# Patient Record
Sex: MALE | Race: WHITE | NOT HISPANIC OR LATINO | Employment: OTHER | URBAN - NONMETROPOLITAN AREA
[De-identification: names, ages, dates, MRNs, and addresses within clinical notes are randomized per-mention and may not be internally consistent; named-entity substitution may affect disease eponyms.]

---

## 2017-01-10 ENCOUNTER — HOSPITAL ENCOUNTER (INPATIENT)
Facility: HOSPITAL | Age: 52
LOS: 3 days | Discharge: HOME | DRG: 282 | End: 2017-01-14
Attending: EMERGENCY MEDICINE | Admitting: HOSPITALIST
Payer: COMMERCIAL

## 2017-01-10 ENCOUNTER — APPOINTMENT (EMERGENCY)
Dept: CT IMAGING | Facility: HOSPITAL | Age: 52
DRG: 282 | End: 2017-01-10
Payer: COMMERCIAL

## 2017-01-10 DIAGNOSIS — K85.90 ACUTE PANCREATITIS: Primary | ICD-10-CM

## 2017-01-10 DIAGNOSIS — R10.84 GENERALIZED ABDOMINAL PAIN: ICD-10-CM

## 2017-01-10 DIAGNOSIS — R14.0 ABDOMINAL DISTENTION: ICD-10-CM

## 2017-01-10 LAB
ALBUMIN SERPL BCP-MCNC: 3.3 G/DL (ref 3.5–5)
ALP SERPL-CCNC: 235 U/L (ref 46–116)
ALT SERPL W P-5'-P-CCNC: 23 U/L (ref 12–78)
ANION GAP SERPL CALCULATED.3IONS-SCNC: 13 MMOL/L (ref 4–13)
AST SERPL W P-5'-P-CCNC: 69 U/L (ref 5–45)
BASOPHILS # BLD AUTO: 0.08 THOUSANDS/ΜL (ref 0–0.1)
BASOPHILS NFR BLD AUTO: 1 % (ref 0–1)
BILIRUB SERPL-MCNC: 0.2 MG/DL (ref 0.2–1)
BILIRUB UR QL STRIP: NEGATIVE
BUN SERPL-MCNC: 5 MG/DL (ref 5–25)
CALCIUM SERPL-MCNC: 8.2 MG/DL (ref 8.3–10.1)
CHLORIDE SERPL-SCNC: 92 MMOL/L (ref 100–108)
CLARITY UR: CLEAR
CO2 SERPL-SCNC: 25 MMOL/L (ref 21–32)
COLOR UR: NORMAL
CREAT SERPL-MCNC: 0.47 MG/DL (ref 0.6–1.3)
EOSINOPHIL # BLD AUTO: 0.11 THOUSAND/ΜL (ref 0–0.61)
EOSINOPHIL NFR BLD AUTO: 2 % (ref 0–6)
ERYTHROCYTE [DISTWIDTH] IN BLOOD BY AUTOMATED COUNT: 18.6 % (ref 11.6–15.1)
GFR SERPL CREATININE-BSD FRML MDRD: >60 ML/MIN/1.73SQ M
GLUCOSE SERPL-MCNC: 81 MG/DL (ref 65–140)
GLUCOSE UR STRIP-MCNC: NEGATIVE MG/DL
HCT VFR BLD AUTO: 26.2 % (ref 36.5–49.3)
HGB BLD-MCNC: 7.8 G/DL (ref 12–17)
HGB UR QL STRIP.AUTO: NEGATIVE
HOLD SPECIMEN: NORMAL
KETONES UR STRIP-MCNC: NEGATIVE MG/DL
LACTATE SERPL-SCNC: 1.9 MMOL/L (ref 0.5–2)
LEUKOCYTE ESTERASE UR QL STRIP: NEGATIVE
LIPASE SERPL-CCNC: 706 U/L (ref 73–393)
LYMPHOCYTES # BLD AUTO: 2.8 THOUSANDS/ΜL (ref 0.6–4.47)
LYMPHOCYTES NFR BLD AUTO: 44 % (ref 14–44)
MCH RBC QN AUTO: 21.5 PG (ref 26.8–34.3)
MCHC RBC AUTO-ENTMCNC: 29.8 G/DL (ref 31.4–37.4)
MCV RBC AUTO: 72 FL (ref 82–98)
MONOCYTES # BLD AUTO: 0.79 THOUSAND/ΜL (ref 0.17–1.22)
MONOCYTES NFR BLD AUTO: 13 % (ref 4–12)
NEUTROPHILS # BLD AUTO: 2.54 THOUSANDS/ΜL (ref 1.85–7.62)
NEUTS SEG NFR BLD AUTO: 40 % (ref 43–75)
NITRITE UR QL STRIP: NEGATIVE
PH UR STRIP.AUTO: 6 [PH] (ref 4.5–8)
PLATELET # BLD AUTO: 215 THOUSANDS/UL (ref 149–390)
PMV BLD AUTO: 8.8 FL (ref 8.9–12.7)
POTASSIUM SERPL-SCNC: 3.5 MMOL/L (ref 3.5–5.3)
PROT SERPL-MCNC: 7.5 G/DL (ref 6.4–8.2)
PROT UR STRIP-MCNC: NEGATIVE MG/DL
RBC # BLD AUTO: 3.62 MILLION/UL (ref 3.88–5.62)
SODIUM SERPL-SCNC: 130 MMOL/L (ref 136–145)
SP GR UR STRIP.AUTO: <=1.005 (ref 1–1.03)
UROBILINOGEN UR QL STRIP.AUTO: 0.2 E.U./DL
WBC # BLD AUTO: 6.32 THOUSAND/UL (ref 4.31–10.16)

## 2017-01-10 PROCEDURE — 83605 ASSAY OF LACTIC ACID: CPT | Performed by: EMERGENCY MEDICINE

## 2017-01-10 PROCEDURE — 81003 URINALYSIS AUTO W/O SCOPE: CPT | Performed by: EMERGENCY MEDICINE

## 2017-01-10 PROCEDURE — 83690 ASSAY OF LIPASE: CPT | Performed by: EMERGENCY MEDICINE

## 2017-01-10 PROCEDURE — 85025 COMPLETE CBC W/AUTO DIFF WBC: CPT | Performed by: EMERGENCY MEDICINE

## 2017-01-10 PROCEDURE — 36415 COLL VENOUS BLD VENIPUNCTURE: CPT

## 2017-01-10 PROCEDURE — 74177 CT ABD & PELVIS W/CONTRAST: CPT

## 2017-01-10 PROCEDURE — 80053 COMPREHEN METABOLIC PANEL: CPT | Performed by: EMERGENCY MEDICINE

## 2017-01-10 PROCEDURE — 93005 ELECTROCARDIOGRAM TRACING: CPT | Performed by: EMERGENCY MEDICINE

## 2017-01-10 PROCEDURE — 96374 THER/PROPH/DIAG INJ IV PUSH: CPT

## 2017-01-10 RX ADMIN — HYDROMORPHONE HYDROCHLORIDE 1 MG: 1 INJECTION, SOLUTION INTRAMUSCULAR; INTRAVENOUS; SUBCUTANEOUS at 21:22

## 2017-01-10 RX ADMIN — IOHEXOL 100 ML: 350 INJECTION, SOLUTION INTRAVENOUS at 22:56

## 2017-01-10 RX ADMIN — IOHEXOL 50 ML: 240 INJECTION, SOLUTION INTRATHECAL; INTRAVASCULAR; INTRAVENOUS; ORAL at 22:57

## 2017-01-11 ENCOUNTER — APPOINTMENT (INPATIENT)
Dept: ULTRASOUND IMAGING | Facility: HOSPITAL | Age: 52
DRG: 282 | End: 2017-01-11
Payer: COMMERCIAL

## 2017-01-11 ENCOUNTER — APPOINTMENT (INPATIENT)
Dept: NUCLEAR MEDICINE | Facility: HOSPITAL | Age: 52
DRG: 282 | End: 2017-01-11
Payer: COMMERCIAL

## 2017-01-11 PROBLEM — K85.90 ACUTE PANCREATITIS: Status: ACTIVE | Noted: 2017-01-11

## 2017-01-11 PROBLEM — K80.20 CHOLELITHIASIS: Status: ACTIVE | Noted: 2017-01-11

## 2017-01-11 PROBLEM — R10.84 GENERALIZED ABDOMINAL PAIN: Status: ACTIVE | Noted: 2017-01-11

## 2017-01-11 PROBLEM — D64.9 ANEMIA: Status: ACTIVE | Noted: 2017-01-11

## 2017-01-11 PROBLEM — K80.50 CHOLEDOCHOLITHIASIS: Status: ACTIVE | Noted: 2017-01-11

## 2017-01-11 PROBLEM — E87.6 HYPOKALEMIA: Status: ACTIVE | Noted: 2017-01-11

## 2017-01-11 PROBLEM — K43.9 VENTRAL HERNIA: Status: ACTIVE | Noted: 2017-01-11

## 2017-01-11 PROBLEM — E87.1 HYPONATREMIA: Status: ACTIVE | Noted: 2017-01-11

## 2017-01-11 PROBLEM — R10.9 INTRACTABLE ABDOMINAL PAIN: Status: ACTIVE | Noted: 2017-01-11

## 2017-01-11 PROBLEM — R10.84 GENERALIZED ABDOMINAL PAIN: Status: RESOLVED | Noted: 2017-01-11 | Resolved: 2017-01-11

## 2017-01-11 LAB
ANION GAP SERPL CALCULATED.3IONS-SCNC: 12 MMOL/L (ref 4–13)
ATRIAL RATE: 75 BPM
BUN SERPL-MCNC: 3 MG/DL (ref 5–25)
CALCIUM SERPL-MCNC: 7.9 MG/DL (ref 8.3–10.1)
CHLORIDE SERPL-SCNC: 96 MMOL/L (ref 100–108)
CO2 SERPL-SCNC: 27 MMOL/L (ref 21–32)
CREAT SERPL-MCNC: 0.35 MG/DL (ref 0.6–1.3)
ERYTHROCYTE [DISTWIDTH] IN BLOOD BY AUTOMATED COUNT: 18.7 % (ref 11.6–15.1)
GFR SERPL CREATININE-BSD FRML MDRD: >60 ML/MIN/1.73SQ M
GLUCOSE SERPL-MCNC: 77 MG/DL (ref 65–140)
HCT VFR BLD AUTO: 27.4 % (ref 36.5–49.3)
HGB BLD-MCNC: 8.1 G/DL (ref 12–17)
MCH RBC QN AUTO: 21.6 PG (ref 26.8–34.3)
MCHC RBC AUTO-ENTMCNC: 29.6 G/DL (ref 31.4–37.4)
MCV RBC AUTO: 73 FL (ref 82–98)
P AXIS: 70 DEGREES
PLATELET # BLD AUTO: 218 THOUSANDS/UL (ref 149–390)
PMV BLD AUTO: 8.7 FL (ref 8.9–12.7)
POTASSIUM SERPL-SCNC: 3 MMOL/L (ref 3.5–5.3)
PR INTERVAL: 162 MS
QRS AXIS: -15 DEGREES
QRSD INTERVAL: 94 MS
QT INTERVAL: 410 MS
QTC INTERVAL: 457 MS
RBC # BLD AUTO: 3.75 MILLION/UL (ref 3.88–5.62)
SODIUM SERPL-SCNC: 135 MMOL/L (ref 136–145)
T WAVE AXIS: 54 DEGREES
VENTRICULAR RATE: 75 BPM
WBC # BLD AUTO: 4.22 THOUSAND/UL (ref 4.31–10.16)

## 2017-01-11 PROCEDURE — 78227 HEPATOBIL SYST IMAGE W/DRUG: CPT

## 2017-01-11 PROCEDURE — 76705 ECHO EXAM OF ABDOMEN: CPT

## 2017-01-11 PROCEDURE — 85027 COMPLETE CBC AUTOMATED: CPT | Performed by: PHYSICIAN ASSISTANT

## 2017-01-11 PROCEDURE — 80048 BASIC METABOLIC PNL TOTAL CA: CPT | Performed by: PHYSICIAN ASSISTANT

## 2017-01-11 PROCEDURE — A9537 TC99M MEBROFENIN: HCPCS

## 2017-01-11 PROCEDURE — 99285 EMERGENCY DEPT VISIT HI MDM: CPT

## 2017-01-11 RX ORDER — POTASSIUM CHLORIDE 20 MEQ/1
40 TABLET, EXTENDED RELEASE ORAL
Status: COMPLETED | OUTPATIENT
Start: 2017-01-11 | End: 2017-01-11

## 2017-01-11 RX ORDER — ONDANSETRON 2 MG/ML
4 INJECTION INTRAMUSCULAR; INTRAVENOUS EVERY 6 HOURS PRN
Status: DISCONTINUED | OUTPATIENT
Start: 2017-01-11 | End: 2017-01-14 | Stop reason: HOSPADM

## 2017-01-11 RX ORDER — DIPHENHYDRAMINE HYDROCHLORIDE 50 MG/ML
25 INJECTION INTRAMUSCULAR; INTRAVENOUS EVERY 6 HOURS PRN
Status: DISCONTINUED | OUTPATIENT
Start: 2017-01-11 | End: 2017-01-14 | Stop reason: HOSPADM

## 2017-01-11 RX ORDER — ACETAMINOPHEN 325 MG/1
650 TABLET ORAL EVERY 6 HOURS PRN
Status: DISCONTINUED | OUTPATIENT
Start: 2017-01-11 | End: 2017-01-11

## 2017-01-11 RX ORDER — LORATADINE 10 MG/1
10 TABLET ORAL DAILY
Status: DISCONTINUED | OUTPATIENT
Start: 2017-01-11 | End: 2017-01-14 | Stop reason: HOSPADM

## 2017-01-11 RX ORDER — HEPARIN SODIUM 5000 [USP'U]/ML
5000 INJECTION, SOLUTION INTRAVENOUS; SUBCUTANEOUS EVERY 8 HOURS SCHEDULED
Status: DISCONTINUED | OUTPATIENT
Start: 2017-01-11 | End: 2017-01-14 | Stop reason: HOSPADM

## 2017-01-11 RX ORDER — KETOROLAC TROMETHAMINE 30 MG/ML
15 INJECTION, SOLUTION INTRAMUSCULAR; INTRAVENOUS EVERY 6 HOURS PRN
Status: DISCONTINUED | OUTPATIENT
Start: 2017-01-11 | End: 2017-01-11

## 2017-01-11 RX ORDER — SODIUM CHLORIDE AND POTASSIUM CHLORIDE .9; .15 G/100ML; G/100ML
125 SOLUTION INTRAVENOUS CONTINUOUS
Status: DISCONTINUED | OUTPATIENT
Start: 2017-01-11 | End: 2017-01-12

## 2017-01-11 RX ORDER — HYDROXYZINE HYDROCHLORIDE 25 MG/1
25 TABLET, FILM COATED ORAL EVERY 6 HOURS PRN
Status: DISCONTINUED | OUTPATIENT
Start: 2017-01-11 | End: 2017-01-11

## 2017-01-11 RX ORDER — NICOTINE 21 MG/24HR
1 PATCH, TRANSDERMAL 24 HOURS TRANSDERMAL DAILY
Status: DISCONTINUED | OUTPATIENT
Start: 2017-01-11 | End: 2017-01-14 | Stop reason: HOSPADM

## 2017-01-11 RX ORDER — SODIUM CHLORIDE 9 MG/ML
125 INJECTION, SOLUTION INTRAVENOUS CONTINUOUS
Status: DISCONTINUED | OUTPATIENT
Start: 2017-01-11 | End: 2017-01-11

## 2017-01-11 RX ORDER — PANTOPRAZOLE SODIUM 20 MG/1
20 TABLET, DELAYED RELEASE ORAL
Status: DISCONTINUED | OUTPATIENT
Start: 2017-01-11 | End: 2017-01-11

## 2017-01-11 RX ORDER — FOLIC ACID 1 MG/1
1 TABLET ORAL DAILY
Status: DISCONTINUED | OUTPATIENT
Start: 2017-01-11 | End: 2017-01-14 | Stop reason: HOSPADM

## 2017-01-11 RX ORDER — LISINOPRIL 10 MG/1
10 TABLET ORAL DAILY
Status: DISCONTINUED | OUTPATIENT
Start: 2017-01-11 | End: 2017-01-14 | Stop reason: HOSPADM

## 2017-01-11 RX ORDER — THIAMINE MONONITRATE (VIT B1) 100 MG
100 TABLET ORAL DAILY
Status: DISCONTINUED | OUTPATIENT
Start: 2017-01-11 | End: 2017-01-14 | Stop reason: HOSPADM

## 2017-01-11 RX ORDER — HYDROXYZINE HYDROCHLORIDE 25 MG/1
50 TABLET, FILM COATED ORAL EVERY 6 HOURS PRN
Status: DISCONTINUED | OUTPATIENT
Start: 2017-01-11 | End: 2017-01-14 | Stop reason: HOSPADM

## 2017-01-11 RX ORDER — PANTOPRAZOLE SODIUM 40 MG/1
40 INJECTION, POWDER, FOR SOLUTION INTRAVENOUS DAILY
Status: DISCONTINUED | OUTPATIENT
Start: 2017-01-12 | End: 2017-01-14 | Stop reason: HOSPADM

## 2017-01-11 RX ORDER — FLUOXETINE HYDROCHLORIDE 20 MG/1
20 CAPSULE ORAL DAILY
Status: DISCONTINUED | OUTPATIENT
Start: 2017-01-11 | End: 2017-01-14 | Stop reason: HOSPADM

## 2017-01-11 RX ORDER — SODIUM CHLORIDE, SODIUM LACTATE, POTASSIUM CHLORIDE, CALCIUM CHLORIDE 600; 310; 30; 20 MG/100ML; MG/100ML; MG/100ML; MG/100ML
150 INJECTION, SOLUTION INTRAVENOUS CONTINUOUS
Status: DISCONTINUED | OUTPATIENT
Start: 2017-01-11 | End: 2017-01-11

## 2017-01-11 RX ORDER — ACETAMINOPHEN 325 MG/1
650 TABLET ORAL EVERY 6 HOURS PRN
Status: DISCONTINUED | OUTPATIENT
Start: 2017-01-11 | End: 2017-01-12

## 2017-01-11 RX ORDER — LEVALBUTEROL 1.25 MG/.5ML
1.25 SOLUTION, CONCENTRATE RESPIRATORY (INHALATION) EVERY 6 HOURS PRN
Status: DISCONTINUED | OUTPATIENT
Start: 2017-01-11 | End: 2017-01-14 | Stop reason: HOSPADM

## 2017-01-11 RX ORDER — DIPHENHYDRAMINE HCL 25 MG
25 TABLET ORAL EVERY 6 HOURS PRN
Status: DISCONTINUED | OUTPATIENT
Start: 2017-01-11 | End: 2017-01-14 | Stop reason: HOSPADM

## 2017-01-11 RX ADMIN — PIPERACILLIN AND TAZOBACTAM 3.38 G: 3; .375 INJECTION, POWDER, FOR SOLUTION INTRAVENOUS at 22:22

## 2017-01-11 RX ADMIN — HEPARIN SODIUM 5000 UNITS: 5000 INJECTION, SOLUTION INTRAVENOUS; SUBCUTANEOUS at 22:22

## 2017-01-11 RX ADMIN — FOLIC ACID 1 MG: 1 TABLET ORAL at 08:19

## 2017-01-11 RX ADMIN — SODIUM CHLORIDE AND POTASSIUM CHLORIDE 125 ML/HR: .9; .15 SOLUTION INTRAVENOUS at 19:59

## 2017-01-11 RX ADMIN — SINCALIDE 1.2 MCG: 5 INJECTION, POWDER, LYOPHILIZED, FOR SOLUTION INTRAVENOUS at 14:49

## 2017-01-11 RX ADMIN — POTASSIUM CHLORIDE 40 MEQ: 1500 TABLET, EXTENDED RELEASE ORAL at 12:01

## 2017-01-11 RX ADMIN — HYDROMORPHONE HYDROCHLORIDE 1 MG: 1 INJECTION, SOLUTION INTRAMUSCULAR; INTRAVENOUS; SUBCUTANEOUS at 10:31

## 2017-01-11 RX ADMIN — HYDROXYZINE HYDROCHLORIDE 50 MG: 25 TABLET, FILM COATED ORAL at 14:09

## 2017-01-11 RX ADMIN — SODIUM CHLORIDE, SODIUM LACTATE, POTASSIUM CHLORIDE, AND CALCIUM CHLORIDE 150 ML/HR: .6; .31; .03; .02 INJECTION, SOLUTION INTRAVENOUS at 00:33

## 2017-01-11 RX ADMIN — HYDROMORPHONE HYDROCHLORIDE 1 MG: 1 INJECTION, SOLUTION INTRAMUSCULAR; INTRAVENOUS; SUBCUTANEOUS at 02:04

## 2017-01-11 RX ADMIN — PANTOPRAZOLE SODIUM 20 MG: 20 TABLET, DELAYED RELEASE ORAL at 05:03

## 2017-01-11 RX ADMIN — HEPARIN SODIUM 5000 UNITS: 5000 INJECTION, SOLUTION INTRAVENOUS; SUBCUTANEOUS at 02:04

## 2017-01-11 RX ADMIN — SODIUM CHLORIDE AND POTASSIUM CHLORIDE 125 ML/HR: .9; .15 SOLUTION INTRAVENOUS at 08:14

## 2017-01-11 RX ADMIN — LORATADINE 10 MG: 10 TABLET ORAL at 08:19

## 2017-01-11 RX ADMIN — DIPHENHYDRAMINE HYDROCHLORIDE 25 MG: 50 INJECTION, SOLUTION INTRAMUSCULAR; INTRAVENOUS at 16:54

## 2017-01-11 RX ADMIN — SODIUM CHLORIDE 125 ML/HR: 0.9 INJECTION, SOLUTION INTRAVENOUS at 04:14

## 2017-01-11 RX ADMIN — KETOROLAC TROMETHAMINE 15 MG: 30 INJECTION, SOLUTION INTRAMUSCULAR at 06:02

## 2017-01-11 RX ADMIN — Medication 100 MG: at 08:19

## 2017-01-11 RX ADMIN — DIPHENHYDRAMINE HYDROCHLORIDE 25 MG: 50 INJECTION, SOLUTION INTRAMUSCULAR; INTRAVENOUS at 04:18

## 2017-01-11 RX ADMIN — HYDROMORPHONE HYDROCHLORIDE 1 MG: 1 INJECTION, SOLUTION INTRAMUSCULAR; INTRAVENOUS; SUBCUTANEOUS at 16:53

## 2017-01-11 RX ADMIN — DIPHENHYDRAMINE HCL 25 MG: 25 TABLET ORAL at 02:04

## 2017-01-11 RX ADMIN — FLUOXETINE 20 MG: 20 CAPSULE ORAL at 08:19

## 2017-01-11 RX ADMIN — DIPHENHYDRAMINE HYDROCHLORIDE 25 MG: 50 INJECTION, SOLUTION INTRAMUSCULAR; INTRAVENOUS at 10:29

## 2017-01-11 RX ADMIN — HYDROMORPHONE HYDROCHLORIDE 1 MG: 1 INJECTION, SOLUTION INTRAMUSCULAR; INTRAVENOUS; SUBCUTANEOUS at 19:59

## 2017-01-11 RX ADMIN — POTASSIUM CHLORIDE 40 MEQ: 1500 TABLET, EXTENDED RELEASE ORAL at 16:54

## 2017-01-11 RX ADMIN — HEPARIN SODIUM 5000 UNITS: 5000 INJECTION, SOLUTION INTRAVENOUS; SUBCUTANEOUS at 14:09

## 2017-01-11 RX ADMIN — PIPERACILLIN AND TAZOBACTAM 3.38 G: 3; .375 INJECTION, POWDER, FOR SOLUTION INTRAVENOUS at 16:54

## 2017-01-11 RX ADMIN — HYDROXYZINE HYDROCHLORIDE 25 MG: 25 TABLET, FILM COATED ORAL at 08:19

## 2017-01-11 RX ADMIN — NICOTINE 1 PATCH: 14 PATCH, EXTENDED RELEASE TRANSDERMAL at 08:18

## 2017-01-11 RX ADMIN — Medication 400 MG: at 12:01

## 2017-01-11 RX ADMIN — LISINOPRIL 10 MG: 10 TABLET ORAL at 08:19

## 2017-01-11 RX ADMIN — HYDROMORPHONE HYDROCHLORIDE 1 MG: 1 INJECTION, SOLUTION INTRAMUSCULAR; INTRAVENOUS; SUBCUTANEOUS at 00:34

## 2017-01-11 RX ADMIN — HYDROXYZINE HYDROCHLORIDE 50 MG: 25 TABLET, FILM COATED ORAL at 19:59

## 2017-01-11 RX ADMIN — Medication 400 MG: at 08:19

## 2017-01-11 RX ADMIN — SODIUM CHLORIDE 2000 ML: 0.9 INJECTION, SOLUTION INTRAVENOUS at 01:41

## 2017-01-12 PROBLEM — K76.0 HEPATIC STEATOSIS: Status: ACTIVE | Noted: 2017-01-12

## 2017-01-12 PROBLEM — R16.0 HEPATOMEGALY: Status: ACTIVE | Noted: 2017-01-12

## 2017-01-12 PROBLEM — K81.0 ACUTE CHOLECYSTITIS: Status: ACTIVE | Noted: 2017-01-12

## 2017-01-12 PROBLEM — E83.42 HYPOMAGNESEMIA: Status: ACTIVE | Noted: 2017-01-12

## 2017-01-12 LAB
25(OH)D3 SERPL-MCNC: 23.8 NG/ML (ref 30–100)
ALBUMIN SERPL BCP-MCNC: 3.4 G/DL (ref 3.5–5)
ALP SERPL-CCNC: 199 U/L (ref 46–116)
ALT SERPL W P-5'-P-CCNC: 23 U/L (ref 12–78)
ANION GAP SERPL CALCULATED.3IONS-SCNC: 10 MMOL/L (ref 4–13)
AST SERPL W P-5'-P-CCNC: 46 U/L (ref 5–45)
BASOPHILS # BLD AUTO: 0.03 THOUSANDS/ΜL (ref 0–0.1)
BASOPHILS NFR BLD AUTO: 1 % (ref 0–1)
BILIRUB SERPL-MCNC: 0.6 MG/DL (ref 0.2–1)
BUN SERPL-MCNC: 3 MG/DL (ref 5–25)
CA-I BLD-SCNC: 1.19 MMOL/L (ref 1.12–1.32)
CALCIUM SERPL-MCNC: 9 MG/DL (ref 8.3–10.1)
CHLORIDE SERPL-SCNC: 96 MMOL/L (ref 100–108)
CK SERPL-CCNC: 94 U/L (ref 39–308)
CO2 SERPL-SCNC: 27 MMOL/L (ref 21–32)
CREAT SERPL-MCNC: 0.55 MG/DL (ref 0.6–1.3)
EOSINOPHIL # BLD AUTO: 0.1 THOUSAND/ΜL (ref 0–0.61)
EOSINOPHIL NFR BLD AUTO: 2 % (ref 0–6)
ERYTHROCYTE [DISTWIDTH] IN BLOOD BY AUTOMATED COUNT: 18.4 % (ref 11.6–15.1)
FOLATE SERPL-MCNC: 11.6 NG/ML (ref 3.1–17.5)
GFR SERPL CREATININE-BSD FRML MDRD: >60 ML/MIN/1.73SQ M
GLUCOSE SERPL-MCNC: 93 MG/DL (ref 65–140)
HAV IGM SER QL: NORMAL
HBV CORE IGM SER QL: NORMAL
HBV SURFACE AG SER QL: NORMAL
HCT VFR BLD AUTO: 28.3 % (ref 36.5–49.3)
HCV AB SER QL: NORMAL
HGB BLD-MCNC: 8.4 G/DL (ref 12–17)
LACTATE SERPL-SCNC: 1.1 MMOL/L (ref 0.5–2)
LIPASE SERPL-CCNC: 206 U/L (ref 73–393)
LYMPHOCYTES # BLD AUTO: 1.84 THOUSANDS/ΜL (ref 0.6–4.47)
LYMPHOCYTES NFR BLD AUTO: 32 % (ref 14–44)
MAGNESIUM SERPL-MCNC: 1.4 MG/DL (ref 1.6–2.6)
MCH RBC QN AUTO: 21.9 PG (ref 26.8–34.3)
MCHC RBC AUTO-ENTMCNC: 29.7 G/DL (ref 31.4–37.4)
MCV RBC AUTO: 74 FL (ref 82–98)
MONOCYTES # BLD AUTO: 0.87 THOUSAND/ΜL (ref 0.17–1.22)
MONOCYTES NFR BLD AUTO: 15 % (ref 4–12)
NEUTROPHILS # BLD AUTO: 2.85 THOUSANDS/ΜL (ref 1.85–7.62)
NEUTS SEG NFR BLD AUTO: 50 % (ref 43–75)
PHOSPHATE SERPL-MCNC: 3.3 MG/DL (ref 2.7–4.5)
PLATELET # BLD AUTO: 218 THOUSANDS/UL (ref 149–390)
PMV BLD AUTO: 9.4 FL (ref 8.9–12.7)
POTASSIUM SERPL-SCNC: 4.2 MMOL/L (ref 3.5–5.3)
PREALB SERPL-MCNC: 23.3 MG/DL (ref 18–40)
PROT SERPL-MCNC: 7.5 G/DL (ref 6.4–8.2)
RBC # BLD AUTO: 3.83 MILLION/UL (ref 3.88–5.62)
SODIUM SERPL-SCNC: 133 MMOL/L (ref 136–145)
VIT B12 SERPL-MCNC: 279 PG/ML (ref 100–900)
WBC # BLD AUTO: 5.69 THOUSAND/UL (ref 4.31–10.16)

## 2017-01-12 PROCEDURE — 97162 PT EVAL MOD COMPLEX 30 MIN: CPT | Performed by: PHYSICAL THERAPIST

## 2017-01-12 PROCEDURE — 80053 COMPREHEN METABOLIC PANEL: CPT | Performed by: INTERNAL MEDICINE

## 2017-01-12 PROCEDURE — C9113 INJ PANTOPRAZOLE SODIUM, VIA: HCPCS | Performed by: INTERNAL MEDICINE

## 2017-01-12 PROCEDURE — 83690 ASSAY OF LIPASE: CPT | Performed by: INTERNAL MEDICINE

## 2017-01-12 PROCEDURE — 82330 ASSAY OF CALCIUM: CPT | Performed by: INTERNAL MEDICINE

## 2017-01-12 PROCEDURE — 82607 VITAMIN B-12: CPT | Performed by: INTERNAL MEDICINE

## 2017-01-12 PROCEDURE — 82550 ASSAY OF CK (CPK): CPT | Performed by: INTERNAL MEDICINE

## 2017-01-12 PROCEDURE — G8979 MOBILITY GOAL STATUS: HCPCS | Performed by: PHYSICAL THERAPIST

## 2017-01-12 PROCEDURE — 84100 ASSAY OF PHOSPHORUS: CPT | Performed by: INTERNAL MEDICINE

## 2017-01-12 PROCEDURE — 85025 COMPLETE CBC W/AUTO DIFF WBC: CPT | Performed by: INTERNAL MEDICINE

## 2017-01-12 PROCEDURE — 82746 ASSAY OF FOLIC ACID SERUM: CPT | Performed by: INTERNAL MEDICINE

## 2017-01-12 PROCEDURE — 82306 VITAMIN D 25 HYDROXY: CPT | Performed by: INTERNAL MEDICINE

## 2017-01-12 PROCEDURE — G8988 SELF CARE GOAL STATUS: HCPCS

## 2017-01-12 PROCEDURE — 97535 SELF CARE MNGMENT TRAINING: CPT

## 2017-01-12 PROCEDURE — 83735 ASSAY OF MAGNESIUM: CPT | Performed by: INTERNAL MEDICINE

## 2017-01-12 PROCEDURE — G8987 SELF CARE CURRENT STATUS: HCPCS

## 2017-01-12 PROCEDURE — G8978 MOBILITY CURRENT STATUS: HCPCS | Performed by: PHYSICAL THERAPIST

## 2017-01-12 PROCEDURE — 84134 ASSAY OF PREALBUMIN: CPT | Performed by: INTERNAL MEDICINE

## 2017-01-12 PROCEDURE — 83605 ASSAY OF LACTIC ACID: CPT | Performed by: INTERNAL MEDICINE

## 2017-01-12 PROCEDURE — 97116 GAIT TRAINING THERAPY: CPT | Performed by: PHYSICAL THERAPIST

## 2017-01-12 PROCEDURE — 97165 OT EVAL LOW COMPLEX 30 MIN: CPT

## 2017-01-12 PROCEDURE — 80074 ACUTE HEPATITIS PANEL: CPT | Performed by: INTERNAL MEDICINE

## 2017-01-12 RX ORDER — MAGNESIUM SULFATE HEPTAHYDRATE 40 MG/ML
2 INJECTION, SOLUTION INTRAVENOUS ONCE
Status: COMPLETED | OUTPATIENT
Start: 2017-01-12 | End: 2017-01-12

## 2017-01-12 RX ORDER — SODIUM CHLORIDE 9 MG/ML
100 INJECTION, SOLUTION INTRAVENOUS CONTINUOUS
Status: DISCONTINUED | OUTPATIENT
Start: 2017-01-12 | End: 2017-01-14 | Stop reason: HOSPADM

## 2017-01-12 RX ADMIN — HYDROMORPHONE HYDROCHLORIDE 1 MG: 1 INJECTION, SOLUTION INTRAMUSCULAR; INTRAVENOUS; SUBCUTANEOUS at 16:39

## 2017-01-12 RX ADMIN — DIPHENHYDRAMINE HYDROCHLORIDE 25 MG: 50 INJECTION, SOLUTION INTRAMUSCULAR; INTRAVENOUS at 16:39

## 2017-01-12 RX ADMIN — PIPERACILLIN AND TAZOBACTAM 3.38 G: 3; .375 INJECTION, POWDER, FOR SOLUTION INTRAVENOUS at 19:02

## 2017-01-12 RX ADMIN — HYDROMORPHONE HYDROCHLORIDE 1 MG: 1 INJECTION, SOLUTION INTRAMUSCULAR; INTRAVENOUS; SUBCUTANEOUS at 01:19

## 2017-01-12 RX ADMIN — NICOTINE 1 PATCH: 14 PATCH, EXTENDED RELEASE TRANSDERMAL at 09:43

## 2017-01-12 RX ADMIN — PIPERACILLIN AND TAZOBACTAM 3.38 G: 3; .375 INJECTION, POWDER, FOR SOLUTION INTRAVENOUS at 05:25

## 2017-01-12 RX ADMIN — SODIUM CHLORIDE 100 ML/HR: 0.9 INJECTION, SOLUTION INTRAVENOUS at 09:38

## 2017-01-12 RX ADMIN — LISINOPRIL 10 MG: 10 TABLET ORAL at 09:40

## 2017-01-12 RX ADMIN — SODIUM CHLORIDE 100 ML/HR: 0.9 INJECTION, SOLUTION INTRAVENOUS at 23:01

## 2017-01-12 RX ADMIN — Medication 100 MG: at 09:40

## 2017-01-12 RX ADMIN — HEPARIN SODIUM 5000 UNITS: 5000 INJECTION, SOLUTION INTRAVENOUS; SUBCUTANEOUS at 14:13

## 2017-01-12 RX ADMIN — FLUOXETINE 20 MG: 20 CAPSULE ORAL at 09:40

## 2017-01-12 RX ADMIN — HYDROXYZINE HYDROCHLORIDE 50 MG: 25 TABLET, FILM COATED ORAL at 02:05

## 2017-01-12 RX ADMIN — HYDROMORPHONE HYDROCHLORIDE 1 MG: 1 INJECTION, SOLUTION INTRAMUSCULAR; INTRAVENOUS; SUBCUTANEOUS at 19:51

## 2017-01-12 RX ADMIN — HYDROMORPHONE HYDROCHLORIDE 1 MG: 1 INJECTION, SOLUTION INTRAMUSCULAR; INTRAVENOUS; SUBCUTANEOUS at 07:56

## 2017-01-12 RX ADMIN — HEPARIN SODIUM 5000 UNITS: 5000 INJECTION, SOLUTION INTRAVENOUS; SUBCUTANEOUS at 21:52

## 2017-01-12 RX ADMIN — PIPERACILLIN AND TAZOBACTAM 3.38 G: 3; .375 INJECTION, POWDER, FOR SOLUTION INTRAVENOUS at 11:53

## 2017-01-12 RX ADMIN — LORATADINE 10 MG: 10 TABLET ORAL at 09:41

## 2017-01-12 RX ADMIN — HEPARIN SODIUM 5000 UNITS: 5000 INJECTION, SOLUTION INTRAVENOUS; SUBCUTANEOUS at 05:25

## 2017-01-12 RX ADMIN — FOLIC ACID 1 MG: 1 TABLET ORAL at 09:40

## 2017-01-12 RX ADMIN — HYDROXYZINE HYDROCHLORIDE 50 MG: 25 TABLET, FILM COATED ORAL at 19:51

## 2017-01-12 RX ADMIN — DIPHENHYDRAMINE HYDROCHLORIDE 25 MG: 50 INJECTION, SOLUTION INTRAMUSCULAR; INTRAVENOUS at 07:54

## 2017-01-12 RX ADMIN — MAGNESIUM SULFATE HEPTAHYDRATE 2 G: 40 INJECTION, SOLUTION INTRAVENOUS at 09:47

## 2017-01-12 RX ADMIN — SODIUM CHLORIDE AND POTASSIUM CHLORIDE 125 ML/HR: .9; .15 SOLUTION INTRAVENOUS at 04:17

## 2017-01-12 RX ADMIN — Medication 1 TABLET: at 09:40

## 2017-01-12 RX ADMIN — PANTOPRAZOLE SODIUM 40 MG: 40 INJECTION, POWDER, FOR SOLUTION INTRAVENOUS at 09:42

## 2017-01-12 RX ADMIN — HYDROXYZINE HYDROCHLORIDE 50 MG: 25 TABLET, FILM COATED ORAL at 09:39

## 2017-01-13 PROBLEM — K81.0 ACUTE CHOLECYSTITIS: Status: RESOLVED | Noted: 2017-01-12 | Resolved: 2017-01-13

## 2017-01-13 PROBLEM — R71.0 DROP IN HEMATOCRIT: Status: ACTIVE | Noted: 2017-01-13

## 2017-01-13 LAB
ALBUMIN SERPL BCP-MCNC: 3.2 G/DL (ref 3.5–5)
ALP SERPL-CCNC: 190 U/L (ref 46–116)
ALT SERPL W P-5'-P-CCNC: 29 U/L (ref 12–78)
ANION GAP SERPL CALCULATED.3IONS-SCNC: 12 MMOL/L (ref 4–13)
AST SERPL W P-5'-P-CCNC: 69 U/L (ref 5–45)
BASOPHILS # BLD AUTO: 0.03 THOUSANDS/ΜL (ref 0–0.1)
BASOPHILS NFR BLD AUTO: 1 % (ref 0–1)
BILIRUB SERPL-MCNC: 0.5 MG/DL (ref 0.2–1)
BUN SERPL-MCNC: 2 MG/DL (ref 5–25)
CALCIUM SERPL-MCNC: 8.6 MG/DL (ref 8.3–10.1)
CHLORIDE SERPL-SCNC: 98 MMOL/L (ref 100–108)
CO2 SERPL-SCNC: 23 MMOL/L (ref 21–32)
CREAT SERPL-MCNC: 0.5 MG/DL (ref 0.6–1.3)
EOSINOPHIL # BLD AUTO: 0.18 THOUSAND/ΜL (ref 0–0.61)
EOSINOPHIL NFR BLD AUTO: 3 % (ref 0–6)
ERYTHROCYTE [DISTWIDTH] IN BLOOD BY AUTOMATED COUNT: 18.3 % (ref 11.6–15.1)
GFR SERPL CREATININE-BSD FRML MDRD: >60 ML/MIN/1.73SQ M
GLUCOSE SERPL-MCNC: 89 MG/DL (ref 65–140)
HCT VFR BLD AUTO: 24.1 % (ref 36.5–49.3)
HCT VFR BLD AUTO: 26.2 % (ref 36.5–49.3)
HGB BLD-MCNC: 7.1 G/DL (ref 12–17)
HGB BLD-MCNC: 7.6 G/DL (ref 12–17)
LACTATE SERPL-SCNC: 1 MMOL/L (ref 0.5–2)
LIPASE SERPL-CCNC: 283 U/L (ref 73–393)
LYMPHOCYTES # BLD AUTO: 1.69 THOUSANDS/ΜL (ref 0.6–4.47)
LYMPHOCYTES NFR BLD AUTO: 29 % (ref 14–44)
MAGNESIUM SERPL-MCNC: 1.4 MG/DL (ref 1.6–2.6)
MAGNESIUM SERPL-MCNC: 1.5 MG/DL (ref 1.6–2.6)
MCH RBC QN AUTO: 21.6 PG (ref 26.8–34.3)
MCHC RBC AUTO-ENTMCNC: 29.5 G/DL (ref 31.4–37.4)
MCV RBC AUTO: 73 FL (ref 82–98)
MONOCYTES # BLD AUTO: 0.86 THOUSAND/ΜL (ref 0.17–1.22)
MONOCYTES NFR BLD AUTO: 15 % (ref 4–12)
NEUTROPHILS # BLD AUTO: 3.05 THOUSANDS/ΜL (ref 1.85–7.62)
NEUTS SEG NFR BLD AUTO: 52 % (ref 43–75)
PHOSPHATE SERPL-MCNC: 4 MG/DL (ref 2.7–4.5)
PLATELET # BLD AUTO: 206 THOUSANDS/UL (ref 149–390)
PMV BLD AUTO: 9.8 FL (ref 8.9–12.7)
POTASSIUM SERPL-SCNC: 3.5 MMOL/L (ref 3.5–5.3)
PROT SERPL-MCNC: 6.8 G/DL (ref 6.4–8.2)
RBC # BLD AUTO: 3.29 MILLION/UL (ref 3.88–5.62)
SODIUM SERPL-SCNC: 133 MMOL/L (ref 136–145)
WBC # BLD AUTO: 5.81 THOUSAND/UL (ref 4.31–10.16)

## 2017-01-13 PROCEDURE — 83735 ASSAY OF MAGNESIUM: CPT | Performed by: INTERNAL MEDICINE

## 2017-01-13 PROCEDURE — 80053 COMPREHEN METABOLIC PANEL: CPT | Performed by: INTERNAL MEDICINE

## 2017-01-13 PROCEDURE — 85018 HEMOGLOBIN: CPT | Performed by: INTERNAL MEDICINE

## 2017-01-13 PROCEDURE — 83605 ASSAY OF LACTIC ACID: CPT | Performed by: INTERNAL MEDICINE

## 2017-01-13 PROCEDURE — 84100 ASSAY OF PHOSPHORUS: CPT | Performed by: INTERNAL MEDICINE

## 2017-01-13 PROCEDURE — C9113 INJ PANTOPRAZOLE SODIUM, VIA: HCPCS | Performed by: INTERNAL MEDICINE

## 2017-01-13 PROCEDURE — 83690 ASSAY OF LIPASE: CPT | Performed by: INTERNAL MEDICINE

## 2017-01-13 PROCEDURE — 85025 COMPLETE CBC W/AUTO DIFF WBC: CPT | Performed by: INTERNAL MEDICINE

## 2017-01-13 PROCEDURE — 97116 GAIT TRAINING THERAPY: CPT | Performed by: PHYSICAL THERAPIST

## 2017-01-13 PROCEDURE — 85014 HEMATOCRIT: CPT | Performed by: INTERNAL MEDICINE

## 2017-01-13 RX ORDER — MELATONIN
1000 DAILY
Status: DISCONTINUED | OUTPATIENT
Start: 2017-01-13 | End: 2017-01-14 | Stop reason: HOSPADM

## 2017-01-13 RX ORDER — CHOLECALCIFEROL (VITAMIN D3) 125 MCG
250 CAPSULE ORAL DAILY
Status: DISCONTINUED | OUTPATIENT
Start: 2017-01-13 | End: 2017-01-14 | Stop reason: HOSPADM

## 2017-01-13 RX ORDER — MAGNESIUM SULFATE HEPTAHYDRATE 40 MG/ML
2 INJECTION, SOLUTION INTRAVENOUS ONCE
Status: COMPLETED | OUTPATIENT
Start: 2017-01-13 | End: 2017-01-13

## 2017-01-13 RX ORDER — POTASSIUM CHLORIDE 20 MEQ/1
40 TABLET, EXTENDED RELEASE ORAL ONCE
Status: COMPLETED | OUTPATIENT
Start: 2017-01-13 | End: 2017-01-13

## 2017-01-13 RX ADMIN — MAGNESIUM SULFATE HEPTAHYDRATE 2 G: 40 INJECTION, SOLUTION INTRAVENOUS at 10:12

## 2017-01-13 RX ADMIN — VITAMIN D, TAB 1000IU (100/BT) 1000 UNITS: 25 TAB at 08:56

## 2017-01-13 RX ADMIN — DIPHENHYDRAMINE HYDROCHLORIDE 25 MG: 50 INJECTION, SOLUTION INTRAMUSCULAR; INTRAVENOUS at 08:54

## 2017-01-13 RX ADMIN — HEPARIN SODIUM 5000 UNITS: 5000 INJECTION, SOLUTION INTRAVENOUS; SUBCUTANEOUS at 21:20

## 2017-01-13 RX ADMIN — NICOTINE 1 PATCH: 14 PATCH, EXTENDED RELEASE TRANSDERMAL at 08:56

## 2017-01-13 RX ADMIN — Medication 400 MG: at 18:20

## 2017-01-13 RX ADMIN — HYDROMORPHONE HYDROCHLORIDE 1 MG: 1 INJECTION, SOLUTION INTRAMUSCULAR; INTRAVENOUS; SUBCUTANEOUS at 03:56

## 2017-01-13 RX ADMIN — MAGNESIUM SULFATE HEPTAHYDRATE 2 G: 40 INJECTION, SOLUTION INTRAVENOUS at 16:10

## 2017-01-13 RX ADMIN — POTASSIUM CHLORIDE 40 MEQ: 1500 TABLET, EXTENDED RELEASE ORAL at 08:55

## 2017-01-13 RX ADMIN — FOLIC ACID 1 MG: 1 TABLET ORAL at 08:55

## 2017-01-13 RX ADMIN — DIPHENHYDRAMINE HCL 25 MG: 25 TABLET ORAL at 00:27

## 2017-01-13 RX ADMIN — PANTOPRAZOLE SODIUM 40 MG: 40 INJECTION, POWDER, FOR SOLUTION INTRAVENOUS at 06:33

## 2017-01-13 RX ADMIN — LORATADINE 10 MG: 10 TABLET ORAL at 08:55

## 2017-01-13 RX ADMIN — HEPARIN SODIUM 5000 UNITS: 5000 INJECTION, SOLUTION INTRAVENOUS; SUBCUTANEOUS at 05:35

## 2017-01-13 RX ADMIN — HYDROMORPHONE HYDROCHLORIDE 1 MG: 1 INJECTION, SOLUTION INTRAMUSCULAR; INTRAVENOUS; SUBCUTANEOUS at 08:54

## 2017-01-13 RX ADMIN — Medication 100 MG: at 08:55

## 2017-01-13 RX ADMIN — HYDROMORPHONE HYDROCHLORIDE 1 MG: 1 INJECTION, SOLUTION INTRAMUSCULAR; INTRAVENOUS; SUBCUTANEOUS at 18:25

## 2017-01-13 RX ADMIN — HYDROXYZINE HYDROCHLORIDE 50 MG: 25 TABLET, FILM COATED ORAL at 12:39

## 2017-01-13 RX ADMIN — HYDROMORPHONE HYDROCHLORIDE 1 MG: 1 INJECTION, SOLUTION INTRAMUSCULAR; INTRAVENOUS; SUBCUTANEOUS at 00:27

## 2017-01-13 RX ADMIN — HEPARIN SODIUM 5000 UNITS: 5000 INJECTION, SOLUTION INTRAVENOUS; SUBCUTANEOUS at 13:57

## 2017-01-13 RX ADMIN — Medication 400 MG: at 08:55

## 2017-01-13 RX ADMIN — CYANOCOBALAMIN TAB 500 MCG 250 MCG: 500 TAB at 08:55

## 2017-01-13 RX ADMIN — FLUOXETINE 20 MG: 20 CAPSULE ORAL at 08:55

## 2017-01-13 RX ADMIN — PIPERACILLIN AND TAZOBACTAM 3.38 G: 3; .375 INJECTION, POWDER, FOR SOLUTION INTRAVENOUS at 06:33

## 2017-01-13 RX ADMIN — PIPERACILLIN AND TAZOBACTAM 3.38 G: 3; .375 INJECTION, POWDER, FOR SOLUTION INTRAVENOUS at 00:33

## 2017-01-13 RX ADMIN — DIPHENHYDRAMINE HYDROCHLORIDE 25 MG: 50 INJECTION, SOLUTION INTRAMUSCULAR; INTRAVENOUS at 18:25

## 2017-01-13 RX ADMIN — HYDROXYZINE HYDROCHLORIDE 50 MG: 25 TABLET, FILM COATED ORAL at 03:56

## 2017-01-13 RX ADMIN — SODIUM CHLORIDE 100 ML/HR: 0.9 INJECTION, SOLUTION INTRAVENOUS at 10:15

## 2017-01-13 RX ADMIN — LISINOPRIL 10 MG: 10 TABLET ORAL at 08:55

## 2017-01-13 RX ADMIN — Medication 1 TABLET: at 08:55

## 2017-01-13 RX ADMIN — HYDROMORPHONE HYDROCHLORIDE 1 MG: 1 INJECTION, SOLUTION INTRAMUSCULAR; INTRAVENOUS; SUBCUTANEOUS at 12:39

## 2017-01-14 ENCOUNTER — APPOINTMENT (INPATIENT)
Dept: MRI IMAGING | Facility: HOSPITAL | Age: 52
DRG: 282 | End: 2017-01-14
Payer: COMMERCIAL

## 2017-01-14 VITALS
OXYGEN SATURATION: 97 % | TEMPERATURE: 98.9 F | HEIGHT: 68 IN | WEIGHT: 128.75 LBS | BODY MASS INDEX: 19.51 KG/M2 | RESPIRATION RATE: 18 BRPM | HEART RATE: 73 BPM | SYSTOLIC BLOOD PRESSURE: 138 MMHG | DIASTOLIC BLOOD PRESSURE: 89 MMHG

## 2017-01-14 PROBLEM — R74.8 ELEVATED ALKALINE PHOSPHATASE LEVEL: Status: ACTIVE | Noted: 2017-01-14

## 2017-01-14 LAB
ALBUMIN SERPL BCP-MCNC: 3.6 G/DL (ref 3.5–5)
ALP SERPL-CCNC: 223 U/L (ref 46–116)
ALT SERPL W P-5'-P-CCNC: 43 U/L (ref 12–78)
ANION GAP SERPL CALCULATED.3IONS-SCNC: 9 MMOL/L (ref 4–13)
AST SERPL W P-5'-P-CCNC: 84 U/L (ref 5–45)
BASOPHILS # BLD AUTO: 0.05 THOUSANDS/ΜL (ref 0–0.1)
BASOPHILS NFR BLD AUTO: 1 % (ref 0–1)
BILIRUB SERPL-MCNC: 0.5 MG/DL (ref 0.2–1)
BUN SERPL-MCNC: 2 MG/DL (ref 5–25)
CALCIUM SERPL-MCNC: 9 MG/DL (ref 8.3–10.1)
CHLORIDE SERPL-SCNC: 96 MMOL/L (ref 100–108)
CO2 SERPL-SCNC: 29 MMOL/L (ref 21–32)
CREAT SERPL-MCNC: 0.47 MG/DL (ref 0.6–1.3)
EOSINOPHIL # BLD AUTO: 0.14 THOUSAND/ΜL (ref 0–0.61)
EOSINOPHIL NFR BLD AUTO: 2 % (ref 0–6)
ERYTHROCYTE [DISTWIDTH] IN BLOOD BY AUTOMATED COUNT: 18.6 % (ref 11.6–15.1)
FERRITIN SERPL-MCNC: 21 NG/ML (ref 8–388)
GFR SERPL CREATININE-BSD FRML MDRD: >60 ML/MIN/1.73SQ M
GLUCOSE SERPL-MCNC: 108 MG/DL (ref 65–140)
HCT VFR BLD AUTO: 27.9 % (ref 36.5–49.3)
HGB BLD-MCNC: 8.3 G/DL (ref 12–17)
IRON SATN MFR SERPL: 3 %
IRON SERPL-MCNC: 14 UG/DL (ref 65–175)
LYMPHOCYTES # BLD AUTO: 1.93 THOUSANDS/ΜL (ref 0.6–4.47)
LYMPHOCYTES NFR BLD AUTO: 29 % (ref 14–44)
MAGNESIUM SERPL-MCNC: 1.7 MG/DL (ref 1.6–2.6)
MCH RBC QN AUTO: 21.7 PG (ref 26.8–34.3)
MCHC RBC AUTO-ENTMCNC: 29.7 G/DL (ref 31.4–37.4)
MCV RBC AUTO: 73 FL (ref 82–98)
MONOCYTES # BLD AUTO: 1.02 THOUSAND/ΜL (ref 0.17–1.22)
MONOCYTES NFR BLD AUTO: 15 % (ref 4–12)
NEUTROPHILS # BLD AUTO: 3.49 THOUSANDS/ΜL (ref 1.85–7.62)
NEUTS SEG NFR BLD AUTO: 53 % (ref 43–75)
PLATELET # BLD AUTO: 244 THOUSANDS/UL (ref 149–390)
PMV BLD AUTO: 10.4 FL (ref 8.9–12.7)
POTASSIUM SERPL-SCNC: 3.8 MMOL/L (ref 3.5–5.3)
PROT SERPL-MCNC: 8.2 G/DL (ref 6.4–8.2)
RBC # BLD AUTO: 3.82 MILLION/UL (ref 3.88–5.62)
SODIUM SERPL-SCNC: 134 MMOL/L (ref 136–145)
TIBC SERPL-MCNC: 488 UG/DL (ref 250–450)
WBC # BLD AUTO: 6.63 THOUSAND/UL (ref 4.31–10.16)

## 2017-01-14 PROCEDURE — 80053 COMPREHEN METABOLIC PANEL: CPT | Performed by: INTERNAL MEDICINE

## 2017-01-14 PROCEDURE — 83540 ASSAY OF IRON: CPT | Performed by: INTERNAL MEDICINE

## 2017-01-14 PROCEDURE — 74181 MRI ABDOMEN W/O CONTRAST: CPT

## 2017-01-14 PROCEDURE — 83735 ASSAY OF MAGNESIUM: CPT | Performed by: INTERNAL MEDICINE

## 2017-01-14 PROCEDURE — C9113 INJ PANTOPRAZOLE SODIUM, VIA: HCPCS | Performed by: INTERNAL MEDICINE

## 2017-01-14 PROCEDURE — 76376 3D RENDER W/INTRP POSTPROCES: CPT

## 2017-01-14 PROCEDURE — 83550 IRON BINDING TEST: CPT | Performed by: INTERNAL MEDICINE

## 2017-01-14 PROCEDURE — 82728 ASSAY OF FERRITIN: CPT | Performed by: INTERNAL MEDICINE

## 2017-01-14 PROCEDURE — 85025 COMPLETE CBC W/AUTO DIFF WBC: CPT | Performed by: INTERNAL MEDICINE

## 2017-01-14 RX ORDER — OMEPRAZOLE 20 MG/1
20 CAPSULE, DELAYED RELEASE ORAL
Refills: 0
Start: 2017-01-15 | End: 2017-07-28 | Stop reason: ALTCHOICE

## 2017-01-14 RX ORDER — POTASSIUM CHLORIDE 20 MEQ/1
20 TABLET, EXTENDED RELEASE ORAL ONCE
Status: COMPLETED | OUTPATIENT
Start: 2017-01-14 | End: 2017-01-14

## 2017-01-14 RX ORDER — MAGNESIUM SULFATE HEPTAHYDRATE 40 MG/ML
2 INJECTION, SOLUTION INTRAVENOUS ONCE
Status: COMPLETED | OUTPATIENT
Start: 2017-01-14 | End: 2017-01-14

## 2017-01-14 RX ORDER — OXYCODONE HYDROCHLORIDE 10 MG/1
10 TABLET ORAL EVERY 4 HOURS PRN
Qty: 20 TABLET | Refills: 0 | Status: SHIPPED | OUTPATIENT
Start: 2017-01-14 | End: 2017-04-22 | Stop reason: ALTCHOICE

## 2017-01-14 RX ORDER — OXYCODONE HYDROCHLORIDE 10 MG/1
10 TABLET ORAL EVERY 4 HOURS PRN
Status: DISCONTINUED | OUTPATIENT
Start: 2017-01-14 | End: 2017-01-14 | Stop reason: HOSPADM

## 2017-01-14 RX ORDER — MULTIVITAMIN
1 TABLET ORAL DAILY
Qty: 30 TABLET | Refills: 2 | Status: SHIPPED | OUTPATIENT
Start: 2017-01-15 | End: 2017-07-28 | Stop reason: ALTCHOICE

## 2017-01-14 RX ORDER — IRON POLYSACCHARIDE COMPLEX 150 MG
150 CAPSULE ORAL 2 TIMES DAILY
Qty: 60 CAPSULE | Refills: 1 | Status: SHIPPED | OUTPATIENT
Start: 2017-01-14 | End: 2017-07-28 | Stop reason: ALTCHOICE

## 2017-01-14 RX ADMIN — Medication 100 MG: at 09:37

## 2017-01-14 RX ADMIN — FOLIC ACID 1 MG: 1 TABLET ORAL at 09:37

## 2017-01-14 RX ADMIN — POTASSIUM CHLORIDE 20 MEQ: 1500 TABLET, EXTENDED RELEASE ORAL at 09:37

## 2017-01-14 RX ADMIN — PANTOPRAZOLE SODIUM 40 MG: 40 INJECTION, POWDER, FOR SOLUTION INTRAVENOUS at 09:37

## 2017-01-14 RX ADMIN — SODIUM CHLORIDE 100 ML/HR: 0.9 INJECTION, SOLUTION INTRAVENOUS at 00:33

## 2017-01-14 RX ADMIN — VITAMIN D, TAB 1000IU (100/BT) 1000 UNITS: 25 TAB at 09:37

## 2017-01-14 RX ADMIN — HYDROMORPHONE HYDROCHLORIDE 1 MG: 1 INJECTION, SOLUTION INTRAMUSCULAR; INTRAVENOUS; SUBCUTANEOUS at 05:33

## 2017-01-14 RX ADMIN — MAGNESIUM SULFATE HEPTAHYDRATE 2 G: 40 INJECTION, SOLUTION INTRAVENOUS at 09:46

## 2017-01-14 RX ADMIN — LORATADINE 10 MG: 10 TABLET ORAL at 09:37

## 2017-01-14 RX ADMIN — DIPHENHYDRAMINE HCL 25 MG: 25 TABLET ORAL at 05:33

## 2017-01-14 RX ADMIN — Medication 1 TABLET: at 09:37

## 2017-01-14 RX ADMIN — LISINOPRIL 10 MG: 10 TABLET ORAL at 09:37

## 2017-01-14 RX ADMIN — CYANOCOBALAMIN TAB 500 MCG 250 MCG: 500 TAB at 09:37

## 2017-01-14 RX ADMIN — FLUOXETINE 20 MG: 20 CAPSULE ORAL at 09:37

## 2017-01-14 RX ADMIN — HEPARIN SODIUM 5000 UNITS: 5000 INJECTION, SOLUTION INTRAVENOUS; SUBCUTANEOUS at 05:33

## 2017-01-14 RX ADMIN — NICOTINE 1 PATCH: 14 PATCH, EXTENDED RELEASE TRANSDERMAL at 09:40

## 2017-02-05 ENCOUNTER — APPOINTMENT (EMERGENCY)
Dept: RADIOLOGY | Facility: HOSPITAL | Age: 52
End: 2017-02-05
Payer: COMMERCIAL

## 2017-02-05 ENCOUNTER — HOSPITAL ENCOUNTER (EMERGENCY)
Facility: HOSPITAL | Age: 52
Discharge: HOME | End: 2017-02-05
Attending: EMERGENCY MEDICINE | Admitting: INTERNAL MEDICINE
Payer: COMMERCIAL

## 2017-02-05 VITALS
HEART RATE: 95 BPM | RESPIRATION RATE: 18 BRPM | WEIGHT: 135 LBS | OXYGEN SATURATION: 100 % | TEMPERATURE: 98.5 F | SYSTOLIC BLOOD PRESSURE: 150 MMHG | BODY MASS INDEX: 20.53 KG/M2 | DIASTOLIC BLOOD PRESSURE: 96 MMHG

## 2017-02-05 DIAGNOSIS — T18.9XXA FOREIGN BODY ALIMENTARY TRACT, INITIAL ENCOUNTER: Primary | ICD-10-CM

## 2017-02-05 DIAGNOSIS — T18.108A ESOPHAGEAL FOREIGN BODY: ICD-10-CM

## 2017-02-05 PROCEDURE — 71020 HB CHEST X-RAY 2VW FRONTAL&LATL: CPT

## 2017-02-05 PROCEDURE — 96361 HYDRATE IV INFUSION ADD-ON: CPT

## 2017-02-05 PROCEDURE — 96375 TX/PRO/DX INJ NEW DRUG ADDON: CPT

## 2017-02-05 PROCEDURE — 96374 THER/PROPH/DIAG INJ IV PUSH: CPT

## 2017-02-05 PROCEDURE — 70360 X-RAY EXAM OF NECK: CPT

## 2017-02-05 PROCEDURE — 99283 EMERGENCY DEPT VISIT LOW MDM: CPT

## 2017-02-05 RX ORDER — ONDANSETRON 2 MG/ML
4 INJECTION INTRAMUSCULAR; INTRAVENOUS ONCE
Status: COMPLETED | OUTPATIENT
Start: 2017-02-05 | End: 2017-02-05

## 2017-02-05 RX ORDER — DIPHENHYDRAMINE HYDROCHLORIDE 50 MG/ML
25 INJECTION INTRAMUSCULAR; INTRAVENOUS ONCE
Status: COMPLETED | OUTPATIENT
Start: 2017-02-05 | End: 2017-02-05

## 2017-02-05 RX ORDER — DIPHENHYDRAMINE HYDROCHLORIDE 50 MG/ML
INJECTION INTRAMUSCULAR; INTRAVENOUS
Status: COMPLETED
Start: 2017-02-05 | End: 2017-02-05

## 2017-02-05 RX ADMIN — GLUCAGON HYDROCHLORIDE 1 MG: KIT at 09:51

## 2017-02-05 RX ADMIN — LIDOCAINE HYDROCHLORIDE 15 ML: 20 SOLUTION ORAL; TOPICAL at 11:54

## 2017-02-05 RX ADMIN — ONDANSETRON 4 MG: 2 INJECTION INTRAMUSCULAR; INTRAVENOUS at 09:51

## 2017-02-05 RX ADMIN — DIPHENHYDRAMINE HYDROCHLORIDE 25 MG: 50 INJECTION INTRAMUSCULAR; INTRAVENOUS at 10:25

## 2017-02-05 RX ADMIN — DIPHENHYDRAMINE HYDROCHLORIDE 25 MG: 50 INJECTION, SOLUTION INTRAMUSCULAR; INTRAVENOUS at 10:25

## 2017-02-05 RX ADMIN — SODIUM CHLORIDE 1000 ML: 0.9 INJECTION, SOLUTION INTRAVENOUS at 09:51

## 2017-02-08 ENCOUNTER — ALLSCRIPTS OFFICE VISIT (OUTPATIENT)
Dept: FAMILY MEDICINE CLINIC | Facility: CLINIC | Age: 52
End: 2017-02-08
Payer: COMMERCIAL

## 2017-02-08 ENCOUNTER — LAB REQUISITION (OUTPATIENT)
Dept: LAB | Facility: HOSPITAL | Age: 52
End: 2017-02-08
Payer: COMMERCIAL

## 2017-02-08 DIAGNOSIS — D64.9 ANEMIA: ICD-10-CM

## 2017-02-08 LAB
ALBUMIN SERPL BCP-MCNC: 3.5 G/DL (ref 3.5–5)
ALP SERPL-CCNC: 203 U/L (ref 46–116)
ALT SERPL W P-5'-P-CCNC: 30 U/L (ref 12–78)
ANION GAP SERPL CALCULATED.3IONS-SCNC: 13 MMOL/L (ref 4–13)
AST SERPL W P-5'-P-CCNC: 66 U/L (ref 5–45)
BASOPHILS # BLD AUTO: 0.06 THOUSANDS/ΜL (ref 0–0.1)
BASOPHILS NFR BLD AUTO: 1 % (ref 0–1)
BILIRUB SERPL-MCNC: 0.35 MG/DL (ref 0.2–1)
BUN SERPL-MCNC: 5 MG/DL (ref 5–25)
CALCIUM SERPL-MCNC: 8.9 MG/DL (ref 8.3–10.1)
CHLORIDE SERPL-SCNC: 90 MMOL/L (ref 100–108)
CO2 SERPL-SCNC: 26 MMOL/L (ref 21–32)
CREAT SERPL-MCNC: 0.39 MG/DL (ref 0.6–1.3)
EOSINOPHIL # BLD AUTO: 0.37 THOUSAND/ΜL (ref 0–0.61)
EOSINOPHIL NFR BLD AUTO: 4 % (ref 0–6)
ERYTHROCYTE [DISTWIDTH] IN BLOOD BY AUTOMATED COUNT: 18.9 % (ref 11.6–15.1)
GFR SERPL CREATININE-BSD FRML MDRD: >60 ML/MIN/1.73SQ M
GLUCOSE SERPL-MCNC: 74 MG/DL (ref 65–140)
HCT VFR BLD AUTO: 26.4 % (ref 36.5–49.3)
HGB BLD-MCNC: 7.6 G/DL (ref 12–17)
LYMPHOCYTES # BLD AUTO: 2.9 THOUSANDS/ΜL (ref 0.6–4.47)
LYMPHOCYTES NFR BLD AUTO: 31 % (ref 14–44)
MAGNESIUM SERPL-MCNC: 1.7 MG/DL (ref 1.6–2.6)
MCH RBC QN AUTO: 20.5 PG (ref 26.8–34.3)
MCHC RBC AUTO-ENTMCNC: 28.8 G/DL (ref 31.4–37.4)
MCV RBC AUTO: 71 FL (ref 82–98)
MONOCYTES # BLD AUTO: 1.14 THOUSAND/ΜL (ref 0.17–1.22)
MONOCYTES NFR BLD AUTO: 12 % (ref 4–12)
NEUTROPHILS # BLD AUTO: 5.02 THOUSANDS/ΜL (ref 1.85–7.62)
NEUTS SEG NFR BLD AUTO: 52 % (ref 43–75)
NRBC BLD AUTO-RTO: 0 /100 WBCS
PLATELET # BLD AUTO: 244 THOUSANDS/UL (ref 149–390)
PMV BLD AUTO: 9.1 FL (ref 8.9–12.7)
POTASSIUM SERPL-SCNC: 4.1 MMOL/L (ref 3.5–5.3)
PROT SERPL-MCNC: 7.9 G/DL (ref 6.4–8.2)
RBC # BLD AUTO: 3.71 MILLION/UL (ref 3.88–5.62)
SODIUM SERPL-SCNC: 129 MMOL/L (ref 136–145)
WBC # BLD AUTO: 9.51 THOUSAND/UL (ref 4.31–10.16)

## 2017-02-08 PROCEDURE — 83735 ASSAY OF MAGNESIUM: CPT | Performed by: INTERNAL MEDICINE

## 2017-02-08 PROCEDURE — 80053 COMPREHEN METABOLIC PANEL: CPT | Performed by: INTERNAL MEDICINE

## 2017-02-08 PROCEDURE — T1015 CLINIC SERVICE: HCPCS | Performed by: PHYSICIAN ASSISTANT

## 2017-02-08 PROCEDURE — 85025 COMPLETE CBC W/AUTO DIFF WBC: CPT | Performed by: INTERNAL MEDICINE

## 2017-03-07 ENCOUNTER — APPOINTMENT (EMERGENCY)
Dept: CT IMAGING | Facility: HOSPITAL | Age: 52
DRG: 282 | End: 2017-03-07
Payer: COMMERCIAL

## 2017-03-07 ENCOUNTER — APPOINTMENT (INPATIENT)
Dept: ULTRASOUND IMAGING | Facility: HOSPITAL | Age: 52
DRG: 282 | End: 2017-03-07
Payer: COMMERCIAL

## 2017-03-07 ENCOUNTER — HOSPITAL ENCOUNTER (INPATIENT)
Facility: HOSPITAL | Age: 52
LOS: 6 days | Discharge: HOME | DRG: 282 | End: 2017-03-13
Attending: EMERGENCY MEDICINE | Admitting: HOSPITALIST
Payer: COMMERCIAL

## 2017-03-07 DIAGNOSIS — Q64.79 STRUCTURAL ABNORMALITY OF BLADDER: ICD-10-CM

## 2017-03-07 DIAGNOSIS — K85.90 ACUTE PANCREATITIS: Primary | ICD-10-CM

## 2017-03-07 DIAGNOSIS — K80.50 CHOLEDOCHOLITHIASIS: ICD-10-CM

## 2017-03-07 DIAGNOSIS — K43.9 VENTRAL HERNIA: ICD-10-CM

## 2017-03-07 DIAGNOSIS — R10.9 INTRACTABLE ABDOMINAL PAIN: ICD-10-CM

## 2017-03-07 DIAGNOSIS — K80.50 COMMON BILE DUCT STONE: ICD-10-CM

## 2017-03-07 DIAGNOSIS — F10.10 ALCOHOL ABUSE: ICD-10-CM

## 2017-03-07 DIAGNOSIS — K80.20 CHOLELITHIASIS: ICD-10-CM

## 2017-03-07 PROBLEM — N32.89 DISTENDED BLADDER: Status: ACTIVE | Noted: 2017-03-07

## 2017-03-07 PROBLEM — E61.1 IRON DEFICIENCY: Status: ACTIVE | Noted: 2017-03-07

## 2017-03-07 PROBLEM — E55.9 VITAMIN D INSUFFICIENCY: Status: ACTIVE | Noted: 2017-03-07

## 2017-03-07 PROBLEM — E83.42 HYPOMAGNESEMIA: Status: RESOLVED | Noted: 2017-01-12 | Resolved: 2017-03-07

## 2017-03-07 PROBLEM — R71.0 DROP IN HEMATOCRIT: Status: RESOLVED | Noted: 2017-01-13 | Resolved: 2017-03-07

## 2017-03-07 PROBLEM — F10.231 DELIRIUM TREMENS (HCC): Status: ACTIVE | Noted: 2017-03-07

## 2017-03-07 PROBLEM — R74.01 TRANSAMINITIS: Status: ACTIVE | Noted: 2017-03-07

## 2017-03-07 PROBLEM — K29.70 GASTRITIS: Status: ACTIVE | Noted: 2017-03-07

## 2017-03-07 PROBLEM — E87.6 HYPOKALEMIA: Status: RESOLVED | Noted: 2017-01-11 | Resolved: 2017-03-07

## 2017-03-07 PROBLEM — F10.931 DELIRIUM TREMENS (HCC): Status: ACTIVE | Noted: 2017-03-07

## 2017-03-07 PROBLEM — K29.80 DUODENITIS: Status: ACTIVE | Noted: 2017-03-07

## 2017-03-07 LAB
ALBUMIN SERPL BCP-MCNC: 3.5 G/DL (ref 3.5–5)
ALP SERPL-CCNC: 272 U/L (ref 46–116)
ALT SERPL W P-5'-P-CCNC: 38 U/L (ref 12–78)
ANION GAP SERPL CALCULATED.3IONS-SCNC: 11 MMOL/L (ref 4–13)
AST SERPL W P-5'-P-CCNC: 100 U/L (ref 5–45)
BASOPHILS # BLD AUTO: 0.07 THOUSANDS/ΜL (ref 0–0.1)
BASOPHILS NFR BLD AUTO: 1 % (ref 0–1)
BILIRUB SERPL-MCNC: 0.5 MG/DL (ref 0.2–1)
BILIRUB UR QL STRIP: NEGATIVE
BUN SERPL-MCNC: 4 MG/DL (ref 5–25)
CALCIUM SERPL-MCNC: 8.6 MG/DL (ref 8.3–10.1)
CHLORIDE SERPL-SCNC: 84 MMOL/L (ref 100–108)
CLARITY UR: CLEAR
CO2 SERPL-SCNC: 28 MMOL/L (ref 21–32)
COLOR UR: NORMAL
CREAT SERPL-MCNC: 0.72 MG/DL (ref 0.6–1.3)
EOSINOPHIL # BLD AUTO: 0.13 THOUSAND/ΜL (ref 0–0.61)
EOSINOPHIL NFR BLD AUTO: 1 % (ref 0–6)
ERYTHROCYTE [DISTWIDTH] IN BLOOD BY AUTOMATED COUNT: 20.7 % (ref 11.6–15.1)
GFR SERPL CREATININE-BSD FRML MDRD: >60 ML/MIN/1.73SQ M
GLUCOSE SERPL-MCNC: 93 MG/DL (ref 65–140)
GLUCOSE UR STRIP-MCNC: NEGATIVE MG/DL
HCT VFR BLD AUTO: 29.3 % (ref 36.5–49.3)
HGB BLD-MCNC: 9.1 G/DL (ref 12–17)
HGB UR QL STRIP.AUTO: NEGATIVE
HOLD SPECIMEN: NORMAL
HOLD SPECIMEN: NORMAL
KETONES UR STRIP-MCNC: NEGATIVE MG/DL
LEUKOCYTE ESTERASE UR QL STRIP: NEGATIVE
LIPASE SERPL-CCNC: 1231 U/L (ref 73–393)
LYMPHOCYTES # BLD AUTO: 3.18 THOUSANDS/ΜL (ref 0.6–4.47)
LYMPHOCYTES NFR BLD AUTO: 25 % (ref 14–44)
MCH RBC QN AUTO: 20.4 PG (ref 26.8–34.3)
MCHC RBC AUTO-ENTMCNC: 31.1 G/DL (ref 31.4–37.4)
MCV RBC AUTO: 66 FL (ref 82–98)
MONOCYTES # BLD AUTO: 1.7 THOUSAND/ΜL (ref 0.17–1.22)
MONOCYTES NFR BLD AUTO: 14 % (ref 4–12)
NEUTROPHILS # BLD AUTO: 7.54 THOUSANDS/ΜL (ref 1.85–7.62)
NEUTS SEG NFR BLD AUTO: 59 % (ref 43–75)
NITRITE UR QL STRIP: NEGATIVE
PH UR STRIP.AUTO: 7 [PH] (ref 4.5–8)
PLATELET # BLD AUTO: 302 THOUSANDS/UL (ref 149–390)
PMV BLD AUTO: 8.9 FL (ref 8.9–12.7)
POTASSIUM SERPL-SCNC: 3.8 MMOL/L (ref 3.5–5.3)
PROT SERPL-MCNC: 8.3 G/DL (ref 6.4–8.2)
PROT UR STRIP-MCNC: NEGATIVE MG/DL
RBC # BLD AUTO: 4.45 MILLION/UL (ref 3.88–5.62)
SODIUM SERPL-SCNC: 123 MMOL/L (ref 136–145)
SP GR UR STRIP.AUTO: <=1.005 (ref 1–1.03)
UROBILINOGEN UR QL STRIP.AUTO: 0.2 E.U./DL
WBC # BLD AUTO: 12.62 THOUSAND/UL (ref 4.31–10.16)

## 2017-03-07 PROCEDURE — 74177 CT ABD & PELVIS W/CONTRAST: CPT

## 2017-03-07 PROCEDURE — 96376 TX/PRO/DX INJ SAME DRUG ADON: CPT

## 2017-03-07 PROCEDURE — 83690 ASSAY OF LIPASE: CPT

## 2017-03-07 PROCEDURE — 96374 THER/PROPH/DIAG INJ IV PUSH: CPT

## 2017-03-07 PROCEDURE — 36415 COLL VENOUS BLD VENIPUNCTURE: CPT

## 2017-03-07 PROCEDURE — 99285 EMERGENCY DEPT VISIT HI MDM: CPT

## 2017-03-07 PROCEDURE — 94760 N-INVAS EAR/PLS OXIMETRY 1: CPT

## 2017-03-07 PROCEDURE — 96361 HYDRATE IV INFUSION ADD-ON: CPT

## 2017-03-07 PROCEDURE — 76770 US EXAM ABDO BACK WALL COMP: CPT

## 2017-03-07 PROCEDURE — 85025 COMPLETE CBC W/AUTO DIFF WBC: CPT

## 2017-03-07 PROCEDURE — 80053 COMPREHEN METABOLIC PANEL: CPT

## 2017-03-07 PROCEDURE — 94664 DEMO&/EVAL PT USE INHALER: CPT

## 2017-03-07 PROCEDURE — 96375 TX/PRO/DX INJ NEW DRUG ADDON: CPT

## 2017-03-07 PROCEDURE — 81003 URINALYSIS AUTO W/O SCOPE: CPT | Performed by: INTERNAL MEDICINE

## 2017-03-07 RX ORDER — ONDANSETRON 2 MG/ML
4 INJECTION INTRAMUSCULAR; INTRAVENOUS ONCE
Status: COMPLETED | OUTPATIENT
Start: 2017-03-07 | End: 2017-03-07

## 2017-03-07 RX ORDER — IRON POLYSACCHARIDE COMPLEX 150 MG
150 CAPSULE ORAL DAILY
Status: DISCONTINUED | OUTPATIENT
Start: 2017-03-07 | End: 2017-03-13 | Stop reason: HOSPADM

## 2017-03-07 RX ORDER — ONDANSETRON 2 MG/ML
4 INJECTION INTRAMUSCULAR; INTRAVENOUS ONCE AS NEEDED
Status: DISCONTINUED | OUTPATIENT
Start: 2017-03-07 | End: 2017-03-13 | Stop reason: HOSPADM

## 2017-03-07 RX ORDER — CHLORDIAZEPOXIDE HYDROCHLORIDE 5 MG/1
10 CAPSULE, GELATIN COATED ORAL EVERY 6 HOURS SCHEDULED
Status: DISCONTINUED | OUTPATIENT
Start: 2017-03-07 | End: 2017-03-11

## 2017-03-07 RX ORDER — DIPHENHYDRAMINE HYDROCHLORIDE 50 MG/ML
25 INJECTION INTRAMUSCULAR; INTRAVENOUS ONCE
Status: COMPLETED | OUTPATIENT
Start: 2017-03-07 | End: 2017-03-07

## 2017-03-07 RX ORDER — DIPHENHYDRAMINE HYDROCHLORIDE 50 MG/ML
INJECTION INTRAMUSCULAR; INTRAVENOUS
Status: COMPLETED
Start: 2017-03-07 | End: 2017-03-07

## 2017-03-07 RX ORDER — LORAZEPAM 2 MG/ML
1 INJECTION INTRAMUSCULAR EVERY 4 HOURS PRN
Status: DISCONTINUED | OUTPATIENT
Start: 2017-03-07 | End: 2017-03-08

## 2017-03-07 RX ORDER — ACETAMINOPHEN 325 MG/1
650 TABLET ORAL EVERY 6 HOURS PRN
Status: DISCONTINUED | OUTPATIENT
Start: 2017-03-07 | End: 2017-03-07

## 2017-03-07 RX ORDER — SODIUM CHLORIDE 9 MG/ML
125 INJECTION, SOLUTION INTRAVENOUS CONTINUOUS
Status: DISCONTINUED | OUTPATIENT
Start: 2017-03-07 | End: 2017-03-10

## 2017-03-07 RX ORDER — HYDROMORPHONE HCL 110MG/55ML
PATIENT CONTROLLED ANALGESIA SYRINGE INTRAVENOUS
Status: COMPLETED
Start: 2017-03-07 | End: 2017-03-07

## 2017-03-07 RX ORDER — FOLIC ACID 1 MG/1
1 TABLET ORAL DAILY
Status: DISCONTINUED | OUTPATIENT
Start: 2017-03-08 | End: 2017-03-13 | Stop reason: HOSPADM

## 2017-03-07 RX ORDER — NICOTINE 21 MG/24HR
1 PATCH, TRANSDERMAL 24 HOURS TRANSDERMAL DAILY
Status: DISCONTINUED | OUTPATIENT
Start: 2017-03-07 | End: 2017-03-13 | Stop reason: HOSPADM

## 2017-03-07 RX ORDER — ONDANSETRON 2 MG/ML
INJECTION INTRAMUSCULAR; INTRAVENOUS
Status: COMPLETED
Start: 2017-03-07 | End: 2017-03-07

## 2017-03-07 RX ORDER — LABETALOL HYDROCHLORIDE 5 MG/ML
10 INJECTION, SOLUTION INTRAVENOUS EVERY 4 HOURS PRN
Status: DISCONTINUED | OUTPATIENT
Start: 2017-03-07 | End: 2017-03-13 | Stop reason: HOSPADM

## 2017-03-07 RX ORDER — SODIUM CHLORIDE 9 MG/ML
125 INJECTION, SOLUTION INTRAVENOUS CONTINUOUS
Status: DISCONTINUED | OUTPATIENT
Start: 2017-03-07 | End: 2017-03-07

## 2017-03-07 RX ORDER — HYDROXYZINE HYDROCHLORIDE 25 MG/1
25 TABLET, FILM COATED ORAL EVERY 6 HOURS PRN
Status: DISCONTINUED | OUTPATIENT
Start: 2017-03-07 | End: 2017-03-13 | Stop reason: HOSPADM

## 2017-03-07 RX ORDER — HYDROMORPHONE HCL 110MG/55ML
1.5 PATIENT CONTROLLED ANALGESIA SYRINGE INTRAVENOUS ONCE
Status: COMPLETED | OUTPATIENT
Start: 2017-03-07 | End: 2017-03-07

## 2017-03-07 RX ORDER — HYDROMORPHONE HCL 110MG/55ML
PATIENT CONTROLLED ANALGESIA SYRINGE INTRAVENOUS
Status: DISPENSED
Start: 2017-03-07 | End: 2017-03-07

## 2017-03-07 RX ORDER — LORAZEPAM 2 MG/ML
1 INJECTION INTRAMUSCULAR ONCE
Status: COMPLETED | OUTPATIENT
Start: 2017-03-07 | End: 2017-03-07

## 2017-03-07 RX ORDER — ALBUTEROL SULFATE 2.5 MG/3ML
2.5 SOLUTION RESPIRATORY (INHALATION) EVERY 6 HOURS PRN
Status: DISCONTINUED | OUTPATIENT
Start: 2017-03-07 | End: 2017-03-13 | Stop reason: HOSPADM

## 2017-03-07 RX ORDER — MAGNESIUM HYDROXIDE/ALUMINUM HYDROXICE/SIMETHICONE 120; 1200; 1200 MG/30ML; MG/30ML; MG/30ML
30 SUSPENSION ORAL EVERY 6 HOURS PRN
Status: DISCONTINUED | OUTPATIENT
Start: 2017-03-07 | End: 2017-03-13 | Stop reason: HOSPADM

## 2017-03-07 RX ADMIN — ENOXAPARIN SODIUM 40 MG: 40 INJECTION SUBCUTANEOUS at 09:26

## 2017-03-07 RX ADMIN — SODIUM CHLORIDE 125 ML/HR: 0.9 INJECTION, SOLUTION INTRAVENOUS at 03:31

## 2017-03-07 RX ADMIN — CHLORDIAZEPOXIDE HYDROCHLORIDE 10 MG: 5 CAPSULE ORAL at 13:45

## 2017-03-07 RX ADMIN — DIPHENHYDRAMINE HYDROCHLORIDE 25 MG: 50 INJECTION INTRAMUSCULAR; INTRAVENOUS at 00:57

## 2017-03-07 RX ADMIN — Medication 1 TABLET: at 09:26

## 2017-03-07 RX ADMIN — IOHEXOL 100 ML: 350 INJECTION, SOLUTION INTRAVENOUS at 03:21

## 2017-03-07 RX ADMIN — ONDANSETRON 4 MG: 2 INJECTION INTRAMUSCULAR; INTRAVENOUS at 00:38

## 2017-03-07 RX ADMIN — DIPHENHYDRAMINE HYDROCHLORIDE 25 MG: 50 INJECTION INTRAMUSCULAR; INTRAVENOUS at 01:42

## 2017-03-07 RX ADMIN — HYDROXYZINE HYDROCHLORIDE 25 MG: 25 TABLET, FILM COATED ORAL at 17:58

## 2017-03-07 RX ADMIN — Medication 1.5 MG: at 00:38

## 2017-03-07 RX ADMIN — HYDROMORPHONE HYDROCHLORIDE 1.5 MG: 2 INJECTION, SOLUTION INTRAMUSCULAR; INTRAVENOUS; SUBCUTANEOUS at 00:38

## 2017-03-07 RX ADMIN — IOHEXOL 50 ML: 240 INJECTION, SOLUTION INTRATHECAL; INTRAVASCULAR; INTRAVENOUS; ORAL at 03:21

## 2017-03-07 RX ADMIN — LORAZEPAM 1 MG: 2 INJECTION INTRAMUSCULAR; INTRAVENOUS at 03:30

## 2017-03-07 RX ADMIN — CHLORDIAZEPOXIDE HYDROCHLORIDE 10 MG: 5 CAPSULE ORAL at 17:26

## 2017-03-07 RX ADMIN — LORAZEPAM 1 MG: 2 INJECTION INTRAMUSCULAR; INTRAVENOUS at 19:01

## 2017-03-07 RX ADMIN — SODIUM CHLORIDE 125 ML/HR: 0.9 INJECTION, SOLUTION INTRAVENOUS at 13:48

## 2017-03-07 RX ADMIN — NICOTINE 1 PATCH: 21 PATCH, EXTENDED RELEASE TRANSDERMAL at 09:26

## 2017-03-07 RX ADMIN — Medication 150 MG: at 09:26

## 2017-03-07 RX ADMIN — DIPHENHYDRAMINE HYDROCHLORIDE 25 MG: 50 INJECTION, SOLUTION INTRAMUSCULAR; INTRAVENOUS at 00:57

## 2017-03-07 RX ADMIN — FOLIC ACID 1 MG: 5 INJECTION, SOLUTION INTRAMUSCULAR; INTRAVENOUS; SUBCUTANEOUS at 09:25

## 2017-03-07 RX ADMIN — LORAZEPAM 1 MG: 2 INJECTION INTRAMUSCULAR; INTRAVENOUS at 13:45

## 2017-03-07 RX ADMIN — CHLORDIAZEPOXIDE HYDROCHLORIDE 10 MG: 5 CAPSULE ORAL at 09:26

## 2017-03-07 RX ADMIN — DIPHENHYDRAMINE HYDROCHLORIDE 25 MG: 50 INJECTION, SOLUTION INTRAMUSCULAR; INTRAVENOUS at 01:42

## 2017-03-07 RX ADMIN — SODIUM CHLORIDE 1000 ML: 0.9 INJECTION, SOLUTION INTRAVENOUS at 00:57

## 2017-03-07 RX ADMIN — LORAZEPAM 1 MG: 2 INJECTION INTRAMUSCULAR; INTRAVENOUS at 06:39

## 2017-03-07 RX ADMIN — LORAZEPAM 1 MG: 2 INJECTION INTRAMUSCULAR; INTRAVENOUS at 05:30

## 2017-03-07 RX ADMIN — HYDROMORPHONE HYDROCHLORIDE 1 MG: 1 INJECTION, SOLUTION INTRAMUSCULAR; INTRAVENOUS; SUBCUTANEOUS at 01:42

## 2017-03-07 RX ADMIN — THIAMINE HYDROCHLORIDE 100 MG: 100 INJECTION, SOLUTION INTRAMUSCULAR; INTRAVENOUS at 09:26

## 2017-03-07 RX ADMIN — HYDROMORPHONE HYDROCHLORIDE 1 MG: 1 INJECTION, SOLUTION INTRAMUSCULAR; INTRAVENOUS; SUBCUTANEOUS at 10:54

## 2017-03-07 RX ADMIN — SODIUM CHLORIDE 125 ML/HR: 0.9 INJECTION, SOLUTION INTRAVENOUS at 04:59

## 2017-03-08 ENCOUNTER — APPOINTMENT (INPATIENT)
Dept: RADIOLOGY | Facility: HOSPITAL | Age: 52
DRG: 282 | End: 2017-03-08
Payer: COMMERCIAL

## 2017-03-08 PROBLEM — M79.671 RIGHT FOOT PAIN: Status: ACTIVE | Noted: 2017-03-08

## 2017-03-08 PROBLEM — R33.9 URINARY RETENTION: Status: ACTIVE | Noted: 2017-03-08

## 2017-03-08 LAB
25(OH)D3 SERPL-MCNC: 16.9 NG/ML (ref 30–100)
ALBUMIN SERPL BCP-MCNC: 3.3 G/DL (ref 3.5–5)
ALP SERPL-CCNC: 234 U/L (ref 46–116)
ALT SERPL W P-5'-P-CCNC: 28 U/L (ref 12–78)
ANION GAP SERPL CALCULATED.3IONS-SCNC: 8 MMOL/L (ref 4–13)
AST SERPL W P-5'-P-CCNC: 53 U/L (ref 5–45)
BASOPHILS # BLD AUTO: 0.04 THOUSANDS/ΜL (ref 0–0.1)
BASOPHILS NFR BLD AUTO: 0 % (ref 0–1)
BILIRUB SERPL-MCNC: 0.7 MG/DL (ref 0.2–1)
BUN SERPL-MCNC: 3 MG/DL (ref 5–25)
CALCIUM SERPL-MCNC: 9 MG/DL (ref 8.3–10.1)
CHLORIDE SERPL-SCNC: 91 MMOL/L (ref 100–108)
CO2 SERPL-SCNC: 31 MMOL/L (ref 21–32)
CREAT SERPL-MCNC: 0.49 MG/DL (ref 0.6–1.3)
EOSINOPHIL # BLD AUTO: 0.12 THOUSAND/ΜL (ref 0–0.61)
EOSINOPHIL NFR BLD AUTO: 1 % (ref 0–6)
ERYTHROCYTE [DISTWIDTH] IN BLOOD BY AUTOMATED COUNT: 21 % (ref 11.6–15.1)
GFR SERPL CREATININE-BSD FRML MDRD: >60 ML/MIN/1.73SQ M
GLUCOSE SERPL-MCNC: 94 MG/DL (ref 65–140)
HAV IGM SER QL: NORMAL
HBV CORE IGM SER QL: NORMAL
HBV SURFACE AG SER QL: NORMAL
HCT VFR BLD AUTO: 30.6 % (ref 36.5–49.3)
HCV AB SER QL: NORMAL
HGB BLD-MCNC: 9.2 G/DL (ref 12–17)
LACTATE SERPL-SCNC: 1 MMOL/L (ref 0.5–2)
LYMPHOCYTES # BLD AUTO: 1.85 THOUSANDS/ΜL (ref 0.6–4.47)
LYMPHOCYTES NFR BLD AUTO: 20 % (ref 14–44)
MAGNESIUM SERPL-MCNC: 1.4 MG/DL (ref 1.6–2.6)
MCH RBC QN AUTO: 20.4 PG (ref 26.8–34.3)
MCHC RBC AUTO-ENTMCNC: 30.1 G/DL (ref 31.4–37.4)
MCV RBC AUTO: 68 FL (ref 82–98)
MONOCYTES # BLD AUTO: 1.33 THOUSAND/ΜL (ref 0.17–1.22)
MONOCYTES NFR BLD AUTO: 14 % (ref 4–12)
NEUTROPHILS # BLD AUTO: 6.06 THOUSANDS/ΜL (ref 1.85–7.62)
NEUTS SEG NFR BLD AUTO: 65 % (ref 43–75)
PHOSPHATE SERPL-MCNC: 3 MG/DL (ref 2.7–4.5)
PLATELET # BLD AUTO: 268 THOUSANDS/UL (ref 149–390)
PMV BLD AUTO: 8.8 FL (ref 8.9–12.7)
POTASSIUM SERPL-SCNC: 3.1 MMOL/L (ref 3.5–5.3)
PROT SERPL-MCNC: 8.4 G/DL (ref 6.4–8.2)
RBC # BLD AUTO: 4.52 MILLION/UL (ref 3.88–5.62)
SODIUM SERPL-SCNC: 130 MMOL/L (ref 136–145)
TSH SERPL DL<=0.05 MIU/L-ACNC: 3.56 UIU/ML (ref 0.36–3.74)
WBC # BLD AUTO: 9.4 THOUSAND/UL (ref 4.31–10.16)

## 2017-03-08 PROCEDURE — 73620 X-RAY EXAM OF FOOT: CPT

## 2017-03-08 PROCEDURE — 84100 ASSAY OF PHOSPHORUS: CPT | Performed by: INTERNAL MEDICINE

## 2017-03-08 PROCEDURE — 80074 ACUTE HEPATITIS PANEL: CPT | Performed by: INTERNAL MEDICINE

## 2017-03-08 PROCEDURE — 83605 ASSAY OF LACTIC ACID: CPT | Performed by: INTERNAL MEDICINE

## 2017-03-08 PROCEDURE — 83735 ASSAY OF MAGNESIUM: CPT | Performed by: INTERNAL MEDICINE

## 2017-03-08 PROCEDURE — 82306 VITAMIN D 25 HYDROXY: CPT | Performed by: INTERNAL MEDICINE

## 2017-03-08 PROCEDURE — 80053 COMPREHEN METABOLIC PANEL: CPT | Performed by: INTERNAL MEDICINE

## 2017-03-08 PROCEDURE — 84443 ASSAY THYROID STIM HORMONE: CPT | Performed by: INTERNAL MEDICINE

## 2017-03-08 PROCEDURE — 85025 COMPLETE CBC W/AUTO DIFF WBC: CPT | Performed by: INTERNAL MEDICINE

## 2017-03-08 RX ORDER — LORAZEPAM 2 MG/ML
1 INJECTION INTRAMUSCULAR ONCE
Status: COMPLETED | OUTPATIENT
Start: 2017-03-08 | End: 2017-03-08

## 2017-03-08 RX ORDER — FAMOTIDINE 20 MG/1
20 TABLET, FILM COATED ORAL 2 TIMES DAILY
Status: DISCONTINUED | OUTPATIENT
Start: 2017-03-08 | End: 2017-03-10

## 2017-03-08 RX ORDER — LORAZEPAM 2 MG/ML
1 INJECTION INTRAMUSCULAR
Status: DISCONTINUED | OUTPATIENT
Start: 2017-03-08 | End: 2017-03-13 | Stop reason: HOSPADM

## 2017-03-08 RX ORDER — POTASSIUM CHLORIDE 20 MEQ/1
40 TABLET, EXTENDED RELEASE ORAL 2 TIMES DAILY
Status: COMPLETED | OUTPATIENT
Start: 2017-03-08 | End: 2017-03-08

## 2017-03-08 RX ORDER — MAGNESIUM SULFATE HEPTAHYDRATE 40 MG/ML
2 INJECTION, SOLUTION INTRAVENOUS ONCE
Status: COMPLETED | OUTPATIENT
Start: 2017-03-08 | End: 2017-03-10

## 2017-03-08 RX ADMIN — CHLORDIAZEPOXIDE HYDROCHLORIDE 10 MG: 5 CAPSULE ORAL at 17:19

## 2017-03-08 RX ADMIN — CHLORDIAZEPOXIDE HYDROCHLORIDE 10 MG: 5 CAPSULE ORAL at 06:20

## 2017-03-08 RX ADMIN — MAGNESIUM SULFATE HEPTAHYDRATE 2 G: 40 INJECTION, SOLUTION INTRAVENOUS at 12:46

## 2017-03-08 RX ADMIN — SODIUM CHLORIDE 125 ML/HR: 0.9 INJECTION, SOLUTION INTRAVENOUS at 08:22

## 2017-03-08 RX ADMIN — Medication 150 MG: at 09:57

## 2017-03-08 RX ADMIN — SODIUM CHLORIDE 125 ML/HR: 0.9 INJECTION, SOLUTION INTRAVENOUS at 20:21

## 2017-03-08 RX ADMIN — FAMOTIDINE 20 MG: 20 TABLET ORAL at 11:29

## 2017-03-08 RX ADMIN — LORAZEPAM 1 MG: 2 INJECTION INTRAMUSCULAR; INTRAVENOUS at 17:30

## 2017-03-08 RX ADMIN — LORAZEPAM 1 MG: 2 INJECTION INTRAMUSCULAR; INTRAVENOUS at 06:44

## 2017-03-08 RX ADMIN — POTASSIUM CHLORIDE 40 MEQ: 1500 TABLET, EXTENDED RELEASE ORAL at 17:19

## 2017-03-08 RX ADMIN — THIAMINE HYDROCHLORIDE 100 MG: 100 INJECTION, SOLUTION INTRAMUSCULAR; INTRAVENOUS at 08:18

## 2017-03-08 RX ADMIN — FAMOTIDINE 20 MG: 20 TABLET ORAL at 17:19

## 2017-03-08 RX ADMIN — FOLIC ACID 1 MG: 1 TABLET ORAL at 09:57

## 2017-03-08 RX ADMIN — Medication 1 TABLET: at 09:57

## 2017-03-08 RX ADMIN — CHLORDIAZEPOXIDE HYDROCHLORIDE 10 MG: 5 CAPSULE ORAL at 00:49

## 2017-03-08 RX ADMIN — CHLORDIAZEPOXIDE HYDROCHLORIDE 10 MG: 5 CAPSULE ORAL at 11:29

## 2017-03-08 RX ADMIN — HYDROXYZINE HYDROCHLORIDE 25 MG: 25 TABLET, FILM COATED ORAL at 06:20

## 2017-03-08 RX ADMIN — SODIUM CHLORIDE 125 ML/HR: 0.9 INJECTION, SOLUTION INTRAVENOUS at 00:10

## 2017-03-08 RX ADMIN — POTASSIUM CHLORIDE 40 MEQ: 1500 TABLET, EXTENDED RELEASE ORAL at 11:29

## 2017-03-08 RX ADMIN — HYDROXYZINE HYDROCHLORIDE 25 MG: 25 TABLET, FILM COATED ORAL at 17:30

## 2017-03-08 RX ADMIN — ENOXAPARIN SODIUM 40 MG: 40 INJECTION SUBCUTANEOUS at 09:57

## 2017-03-08 RX ADMIN — NICOTINE 1 PATCH: 21 PATCH, EXTENDED RELEASE TRANSDERMAL at 09:58

## 2017-03-08 RX ADMIN — LORAZEPAM 1 MG: 2 INJECTION INTRAMUSCULAR; INTRAVENOUS at 12:50

## 2017-03-08 RX ADMIN — LORAZEPAM 1 MG: 2 INJECTION INTRAMUSCULAR; INTRAVENOUS at 03:16

## 2017-03-08 RX ADMIN — HYDROMORPHONE HYDROCHLORIDE 1 MG: 1 INJECTION, SOLUTION INTRAMUSCULAR; INTRAVENOUS; SUBCUTANEOUS at 11:23

## 2017-03-09 PROBLEM — Q64.79 STRUCTURAL ABNORMALITY OF BLADDER: Status: ACTIVE | Noted: 2017-03-09

## 2017-03-09 PROBLEM — E83.39 HYPOPHOSPHATEMIA: Status: ACTIVE | Noted: 2017-03-09

## 2017-03-09 LAB
ALBUMIN SERPL BCP-MCNC: 3 G/DL (ref 3.5–5)
ALP SERPL-CCNC: 225 U/L (ref 46–116)
ALT SERPL W P-5'-P-CCNC: 26 U/L (ref 12–78)
ANION GAP SERPL CALCULATED.3IONS-SCNC: 9 MMOL/L (ref 4–13)
AST SERPL W P-5'-P-CCNC: 54 U/L (ref 5–45)
BASOPHILS # BLD AUTO: 0.04 THOUSANDS/ΜL (ref 0–0.1)
BASOPHILS NFR BLD AUTO: 0 % (ref 0–1)
BILIRUB SERPL-MCNC: 0.6 MG/DL (ref 0.2–1)
BUN SERPL-MCNC: 1 MG/DL (ref 5–25)
CALCIUM SERPL-MCNC: 8.7 MG/DL (ref 8.3–10.1)
CHLORIDE SERPL-SCNC: 92 MMOL/L (ref 100–108)
CO2 SERPL-SCNC: 26 MMOL/L (ref 21–32)
CREAT SERPL-MCNC: 0.54 MG/DL (ref 0.6–1.3)
EOSINOPHIL # BLD AUTO: 0.13 THOUSAND/ΜL (ref 0–0.61)
EOSINOPHIL NFR BLD AUTO: 1 % (ref 0–6)
ERYTHROCYTE [DISTWIDTH] IN BLOOD BY AUTOMATED COUNT: 21.3 % (ref 11.6–15.1)
GFR SERPL CREATININE-BSD FRML MDRD: >60 ML/MIN/1.73SQ M
GLUCOSE SERPL-MCNC: 122 MG/DL (ref 65–140)
HCT VFR BLD AUTO: 29 % (ref 36.5–49.3)
HGB BLD-MCNC: 8.6 G/DL (ref 12–17)
LIPASE SERPL-CCNC: 393 U/L (ref 73–393)
LYMPHOCYTES # BLD AUTO: 1.98 THOUSANDS/ΜL (ref 0.6–4.47)
LYMPHOCYTES NFR BLD AUTO: 21 % (ref 14–44)
MAGNESIUM SERPL-MCNC: 1.7 MG/DL (ref 1.6–2.6)
MCH RBC QN AUTO: 20.4 PG (ref 26.8–34.3)
MCHC RBC AUTO-ENTMCNC: 29.7 G/DL (ref 31.4–37.4)
MCV RBC AUTO: 69 FL (ref 82–98)
MONOCYTES # BLD AUTO: 1.52 THOUSAND/ΜL (ref 0.17–1.22)
MONOCYTES NFR BLD AUTO: 16 % (ref 4–12)
NEUTROPHILS # BLD AUTO: 5.62 THOUSANDS/ΜL (ref 1.85–7.62)
NEUTS SEG NFR BLD AUTO: 62 % (ref 43–75)
PHOSPHATE SERPL-MCNC: 2.5 MG/DL (ref 2.7–4.5)
PLATELET # BLD AUTO: 282 THOUSANDS/UL (ref 149–390)
PMV BLD AUTO: 9.2 FL (ref 8.9–12.7)
POTASSIUM SERPL-SCNC: 3.4 MMOL/L (ref 3.5–5.3)
PROT SERPL-MCNC: 8 G/DL (ref 6.4–8.2)
RBC # BLD AUTO: 4.22 MILLION/UL (ref 3.88–5.62)
SODIUM SERPL-SCNC: 127 MMOL/L (ref 136–145)
WBC # BLD AUTO: 9.29 THOUSAND/UL (ref 4.31–10.16)

## 2017-03-09 PROCEDURE — 84100 ASSAY OF PHOSPHORUS: CPT | Performed by: INTERNAL MEDICINE

## 2017-03-09 PROCEDURE — 83690 ASSAY OF LIPASE: CPT | Performed by: INTERNAL MEDICINE

## 2017-03-09 PROCEDURE — 80053 COMPREHEN METABOLIC PANEL: CPT | Performed by: INTERNAL MEDICINE

## 2017-03-09 PROCEDURE — 85025 COMPLETE CBC W/AUTO DIFF WBC: CPT | Performed by: INTERNAL MEDICINE

## 2017-03-09 PROCEDURE — 83735 ASSAY OF MAGNESIUM: CPT | Performed by: INTERNAL MEDICINE

## 2017-03-09 RX ORDER — POTASSIUM CHLORIDE 20 MEQ/1
40 TABLET, EXTENDED RELEASE ORAL ONCE
Status: COMPLETED | OUTPATIENT
Start: 2017-03-09 | End: 2017-03-09

## 2017-03-09 RX ORDER — LORAZEPAM 2 MG/ML
1 INJECTION INTRAMUSCULAR ONCE
Status: COMPLETED | OUTPATIENT
Start: 2017-03-09 | End: 2017-03-09

## 2017-03-09 RX ORDER — ERGOCALCIFEROL 1.25 MG/1
50000 CAPSULE ORAL WEEKLY
Status: DISCONTINUED | OUTPATIENT
Start: 2017-03-10 | End: 2017-03-13 | Stop reason: HOSPADM

## 2017-03-09 RX ORDER — MAGNESIUM SULFATE HEPTAHYDRATE 40 MG/ML
2 INJECTION, SOLUTION INTRAVENOUS ONCE
Status: COMPLETED | OUTPATIENT
Start: 2017-03-09 | End: 2017-03-10

## 2017-03-09 RX ADMIN — LORAZEPAM 1 MG: 2 INJECTION INTRAMUSCULAR; INTRAVENOUS at 05:39

## 2017-03-09 RX ADMIN — HYDROMORPHONE HYDROCHLORIDE 1 MG: 1 INJECTION, SOLUTION INTRAMUSCULAR; INTRAVENOUS; SUBCUTANEOUS at 19:34

## 2017-03-09 RX ADMIN — POTASSIUM CHLORIDE 40 MEQ: 1500 TABLET, EXTENDED RELEASE ORAL at 12:06

## 2017-03-09 RX ADMIN — SODIUM CHLORIDE 125 ML/HR: 0.9 INJECTION, SOLUTION INTRAVENOUS at 05:30

## 2017-03-09 RX ADMIN — LORAZEPAM 1 MG: 2 INJECTION INTRAMUSCULAR; INTRAVENOUS at 00:31

## 2017-03-09 RX ADMIN — SODIUM CHLORIDE 125 ML/HR: 0.9 INJECTION, SOLUTION INTRAVENOUS at 17:08

## 2017-03-09 RX ADMIN — LORAZEPAM 1 MG: 2 INJECTION, SOLUTION INTRAMUSCULAR; INTRAVENOUS at 03:02

## 2017-03-09 RX ADMIN — CHLORDIAZEPOXIDE HYDROCHLORIDE 10 MG: 5 CAPSULE ORAL at 00:31

## 2017-03-09 RX ADMIN — CHLORDIAZEPOXIDE HYDROCHLORIDE 10 MG: 5 CAPSULE ORAL at 12:06

## 2017-03-09 RX ADMIN — FAMOTIDINE 20 MG: 20 TABLET ORAL at 09:55

## 2017-03-09 RX ADMIN — HYDROXYZINE HYDROCHLORIDE 25 MG: 25 TABLET, FILM COATED ORAL at 00:31

## 2017-03-09 RX ADMIN — Medication 1 TABLET: at 09:54

## 2017-03-09 RX ADMIN — ENOXAPARIN SODIUM 40 MG: 40 INJECTION SUBCUTANEOUS at 09:54

## 2017-03-09 RX ADMIN — THIAMINE HYDROCHLORIDE 100 MG: 100 INJECTION, SOLUTION INTRAMUSCULAR; INTRAVENOUS at 08:16

## 2017-03-09 RX ADMIN — NICOTINE 1 PATCH: 21 PATCH, EXTENDED RELEASE TRANSDERMAL at 09:55

## 2017-03-09 RX ADMIN — Medication 150 MG: at 09:54

## 2017-03-09 RX ADMIN — DIBASIC SODIUM PHOSPHATE, MONOBASIC POTASSIUM PHOSPHATE AND MONOBASIC SODIUM PHOSPHATE 2 TABLET: 852; 155; 130 TABLET ORAL at 12:06

## 2017-03-09 RX ADMIN — FOLIC ACID 1 MG: 1 TABLET ORAL at 09:54

## 2017-03-09 RX ADMIN — CHLORDIAZEPOXIDE HYDROCHLORIDE 10 MG: 5 CAPSULE ORAL at 05:29

## 2017-03-09 RX ADMIN — CHLORDIAZEPOXIDE HYDROCHLORIDE 10 MG: 5 CAPSULE ORAL at 19:31

## 2017-03-09 RX ADMIN — MAGNESIUM SULFATE HEPTAHYDRATE 2 G: 40 INJECTION, SOLUTION INTRAVENOUS at 11:57

## 2017-03-09 RX ADMIN — FAMOTIDINE 20 MG: 20 TABLET ORAL at 19:31

## 2017-03-10 ENCOUNTER — APPOINTMENT (INPATIENT)
Dept: PHYSICAL THERAPY | Facility: HOSPITAL | Age: 52
DRG: 282 | End: 2017-03-10
Payer: COMMERCIAL

## 2017-03-10 LAB
ALBUMIN SERPL BCP-MCNC: 2.9 G/DL (ref 3.5–5)
ALP SERPL-CCNC: 222 U/L (ref 46–116)
ALT SERPL W P-5'-P-CCNC: 37 U/L (ref 12–78)
ANION GAP SERPL CALCULATED.3IONS-SCNC: 8 MMOL/L (ref 4–13)
AST SERPL W P-5'-P-CCNC: 99 U/L (ref 5–45)
BASOPHILS # BLD AUTO: 0.03 THOUSANDS/ΜL (ref 0–0.1)
BASOPHILS NFR BLD AUTO: 0 % (ref 0–1)
BILIRUB SERPL-MCNC: 0.5 MG/DL (ref 0.2–1)
BUN SERPL-MCNC: 3 MG/DL (ref 5–25)
CALCIUM SERPL-MCNC: 8.7 MG/DL (ref 8.3–10.1)
CHLORIDE SERPL-SCNC: 92 MMOL/L (ref 100–108)
CO2 SERPL-SCNC: 26 MMOL/L (ref 21–32)
CREAT SERPL-MCNC: 0.42 MG/DL (ref 0.6–1.3)
EOSINOPHIL # BLD AUTO: 0.16 THOUSAND/ΜL (ref 0–0.61)
EOSINOPHIL NFR BLD AUTO: 2 % (ref 0–6)
ERYTHROCYTE [DISTWIDTH] IN BLOOD BY AUTOMATED COUNT: 21.2 % (ref 11.6–15.1)
GFR SERPL CREATININE-BSD FRML MDRD: >60 ML/MIN/1.73SQ M
GLUCOSE SERPL-MCNC: 116 MG/DL (ref 65–140)
HCT VFR BLD AUTO: 28.1 % (ref 36.5–49.3)
HGB BLD-MCNC: 8.4 G/DL (ref 12–17)
LYMPHOCYTES # BLD AUTO: 1.88 THOUSANDS/ΜL (ref 0.6–4.47)
LYMPHOCYTES NFR BLD AUTO: 20 % (ref 14–44)
MAGNESIUM SERPL-MCNC: 1.3 MG/DL (ref 1.6–2.6)
MCH RBC QN AUTO: 20.4 PG (ref 26.8–34.3)
MCHC RBC AUTO-ENTMCNC: 29.9 G/DL (ref 31.4–37.4)
MCV RBC AUTO: 68 FL (ref 82–98)
MONOCYTES # BLD AUTO: 1.4 THOUSAND/ΜL (ref 0.17–1.22)
MONOCYTES NFR BLD AUTO: 15 % (ref 4–12)
NEUTROPHILS # BLD AUTO: 5.76 THOUSANDS/ΜL (ref 1.85–7.62)
NEUTS SEG NFR BLD AUTO: 63 % (ref 43–75)
PHOSPHATE SERPL-MCNC: 3.1 MG/DL (ref 2.7–4.5)
PLATELET # BLD AUTO: 272 THOUSANDS/UL (ref 149–390)
PMV BLD AUTO: 8.9 FL (ref 8.9–12.7)
POTASSIUM SERPL-SCNC: 3.7 MMOL/L (ref 3.5–5.3)
PROT SERPL-MCNC: 8 G/DL (ref 6.4–8.2)
RBC # BLD AUTO: 4.12 MILLION/UL (ref 3.88–5.62)
SODIUM SERPL-SCNC: 126 MMOL/L (ref 136–145)
WBC # BLD AUTO: 9.23 THOUSAND/UL (ref 4.31–10.16)

## 2017-03-10 PROCEDURE — G8979 MOBILITY GOAL STATUS: HCPCS | Performed by: PHYSICAL THERAPIST

## 2017-03-10 PROCEDURE — 84100 ASSAY OF PHOSPHORUS: CPT | Performed by: INTERNAL MEDICINE

## 2017-03-10 PROCEDURE — 85025 COMPLETE CBC W/AUTO DIFF WBC: CPT | Performed by: INTERNAL MEDICINE

## 2017-03-10 PROCEDURE — G8978 MOBILITY CURRENT STATUS: HCPCS | Performed by: PHYSICAL THERAPIST

## 2017-03-10 PROCEDURE — 97163 PT EVAL HIGH COMPLEX 45 MIN: CPT | Performed by: PHYSICAL THERAPIST

## 2017-03-10 PROCEDURE — C9113 INJ PANTOPRAZOLE SODIUM, VIA: HCPCS | Performed by: INTERNAL MEDICINE

## 2017-03-10 PROCEDURE — 80053 COMPREHEN METABOLIC PANEL: CPT | Performed by: INTERNAL MEDICINE

## 2017-03-10 PROCEDURE — 97116 GAIT TRAINING THERAPY: CPT | Performed by: PHYSICAL THERAPIST

## 2017-03-10 PROCEDURE — 83735 ASSAY OF MAGNESIUM: CPT | Performed by: INTERNAL MEDICINE

## 2017-03-10 RX ORDER — PANTOPRAZOLE SODIUM 40 MG/1
40 INJECTION, POWDER, FOR SOLUTION INTRAVENOUS
Status: DISCONTINUED | OUTPATIENT
Start: 2017-03-10 | End: 2017-03-13 | Stop reason: HOSPADM

## 2017-03-10 RX ORDER — CHLORDIAZEPOXIDE HYDROCHLORIDE 5 MG/1
CAPSULE, GELATIN COATED ORAL
Status: COMPLETED
Start: 2017-03-10 | End: 2017-03-10

## 2017-03-10 RX ORDER — SODIUM CHLORIDE 9 MG/ML
125 INJECTION, SOLUTION INTRAVENOUS CONTINUOUS
Status: DISCONTINUED | OUTPATIENT
Start: 2017-03-10 | End: 2017-03-13 | Stop reason: HOSPADM

## 2017-03-10 RX ORDER — HYDROXYZINE HYDROCHLORIDE 25 MG/1
TABLET, FILM COATED ORAL
Status: COMPLETED
Start: 2017-03-10 | End: 2017-03-10

## 2017-03-10 RX ORDER — MAGNESIUM SULFATE HEPTAHYDRATE 40 MG/ML
2 INJECTION, SOLUTION INTRAVENOUS ONCE
Status: COMPLETED | OUTPATIENT
Start: 2017-03-10 | End: 2017-03-10

## 2017-03-10 RX ORDER — POTASSIUM CHLORIDE 20 MEQ/1
40 TABLET, EXTENDED RELEASE ORAL ONCE
Status: COMPLETED | OUTPATIENT
Start: 2017-03-10 | End: 2017-03-10

## 2017-03-10 RX ADMIN — SODIUM CHLORIDE 125 ML/HR: 0.9 INJECTION, SOLUTION INTRAVENOUS at 09:31

## 2017-03-10 RX ADMIN — SODIUM CHLORIDE 100 ML/HR: 0.9 INJECTION, SOLUTION INTRAVENOUS at 21:32

## 2017-03-10 RX ADMIN — CHLORDIAZEPOXIDE HYDROCHLORIDE 10 MG: 5 CAPSULE ORAL at 01:45

## 2017-03-10 RX ADMIN — SODIUM CHLORIDE 125 ML/HR: 0.9 INJECTION, SOLUTION INTRAVENOUS at 01:30

## 2017-03-10 RX ADMIN — MAGNESIUM SULFATE HEPTAHYDRATE 2 G: 40 INJECTION, SOLUTION INTRAVENOUS at 12:42

## 2017-03-10 RX ADMIN — CHLORDIAZEPOXIDE HYDROCHLORIDE 10 MG: 5 CAPSULE ORAL at 06:16

## 2017-03-10 RX ADMIN — HYDROMORPHONE HYDROCHLORIDE 1 MG: 1 INJECTION, SOLUTION INTRAMUSCULAR; INTRAVENOUS; SUBCUTANEOUS at 15:02

## 2017-03-10 RX ADMIN — CHLORDIAZEPOXIDE HYDROCHLORIDE 10 MG: 5 CAPSULE ORAL at 12:38

## 2017-03-10 RX ADMIN — THIAMINE HYDROCHLORIDE 100 MG: 100 INJECTION, SOLUTION INTRAMUSCULAR; INTRAVENOUS at 09:27

## 2017-03-10 RX ADMIN — POTASSIUM CHLORIDE 40 MEQ: 1500 TABLET, EXTENDED RELEASE ORAL at 12:38

## 2017-03-10 RX ADMIN — FOLIC ACID 1 MG: 1 TABLET ORAL at 09:27

## 2017-03-10 RX ADMIN — FAMOTIDINE 20 MG: 20 TABLET ORAL at 09:27

## 2017-03-10 RX ADMIN — Medication 150 MG: at 09:27

## 2017-03-10 RX ADMIN — HYDROMORPHONE HYDROCHLORIDE 1 MG: 1 INJECTION, SOLUTION INTRAMUSCULAR; INTRAVENOUS; SUBCUTANEOUS at 18:26

## 2017-03-10 RX ADMIN — NICOTINE 1 PATCH: 21 PATCH, EXTENDED RELEASE TRANSDERMAL at 09:26

## 2017-03-10 RX ADMIN — HYDROXYZINE HYDROCHLORIDE 25 MG: 25 TABLET, FILM COATED ORAL at 04:25

## 2017-03-10 RX ADMIN — PANTOPRAZOLE SODIUM 40 MG: 40 INJECTION, POWDER, FOR SOLUTION INTRAVENOUS at 12:43

## 2017-03-10 RX ADMIN — ERGOCALCIFEROL 50000 UNITS: 1.25 CAPSULE ORAL at 09:27

## 2017-03-10 RX ADMIN — Medication 1 TABLET: at 09:28

## 2017-03-10 RX ADMIN — CHLORDIAZEPOXIDE HYDROCHLORIDE 10 MG: 5 CAPSULE ORAL at 18:23

## 2017-03-10 RX ADMIN — ENOXAPARIN SODIUM 40 MG: 40 INJECTION SUBCUTANEOUS at 09:27

## 2017-03-11 ENCOUNTER — APPOINTMENT (INPATIENT)
Dept: ULTRASOUND IMAGING | Facility: HOSPITAL | Age: 52
DRG: 282 | End: 2017-03-11
Payer: COMMERCIAL

## 2017-03-11 PROBLEM — E87.20 LACTIC ACIDOSIS: Status: ACTIVE | Noted: 2017-03-11

## 2017-03-11 PROBLEM — E87.2 LACTIC ACIDOSIS: Status: ACTIVE | Noted: 2017-03-11

## 2017-03-11 PROBLEM — R77.8 ELEVATED TOTAL PROTEIN: Status: ACTIVE | Noted: 2017-03-11

## 2017-03-11 LAB
ALBUMIN SERPL BCP-MCNC: 3 G/DL (ref 3.5–5)
ALP SERPL-CCNC: 212 U/L (ref 46–116)
ALT SERPL W P-5'-P-CCNC: 40 U/L (ref 12–78)
ANION GAP SERPL CALCULATED.3IONS-SCNC: 8 MMOL/L (ref 4–13)
AST SERPL W P-5'-P-CCNC: 63 U/L (ref 5–45)
BASOPHILS # BLD AUTO: 0.05 THOUSANDS/ΜL (ref 0–0.1)
BASOPHILS NFR BLD AUTO: 1 % (ref 0–1)
BILIRUB SERPL-MCNC: 0.5 MG/DL (ref 0.2–1)
BUN SERPL-MCNC: 2 MG/DL (ref 5–25)
CALCIUM SERPL-MCNC: 8.7 MG/DL (ref 8.3–10.1)
CHLORIDE SERPL-SCNC: 92 MMOL/L (ref 100–108)
CO2 SERPL-SCNC: 28 MMOL/L (ref 21–32)
CREAT SERPL-MCNC: 0.53 MG/DL (ref 0.6–1.3)
EOSINOPHIL # BLD AUTO: 0.25 THOUSAND/ΜL (ref 0–0.61)
EOSINOPHIL NFR BLD AUTO: 3 % (ref 0–6)
ERYTHROCYTE [DISTWIDTH] IN BLOOD BY AUTOMATED COUNT: 22.4 % (ref 11.6–15.1)
GFR SERPL CREATININE-BSD FRML MDRD: >60 ML/MIN/1.73SQ M
GLUCOSE SERPL-MCNC: 127 MG/DL (ref 65–140)
HCT VFR BLD AUTO: 30.1 % (ref 36.5–49.3)
HGB BLD-MCNC: 8.9 G/DL (ref 12–17)
LACTATE SERPL-SCNC: 2.1 MMOL/L (ref 0.5–2)
LACTATE SERPL-SCNC: 2.2 MMOL/L (ref 0.5–2)
LIPASE SERPL-CCNC: 370 U/L (ref 73–393)
LYMPHOCYTES # BLD AUTO: 1.71 THOUSANDS/ΜL (ref 0.6–4.47)
LYMPHOCYTES NFR BLD AUTO: 21 % (ref 14–44)
MAGNESIUM SERPL-MCNC: 1.4 MG/DL (ref 1.6–2.6)
MCH RBC QN AUTO: 20.6 PG (ref 26.8–34.3)
MCHC RBC AUTO-ENTMCNC: 29.6 G/DL (ref 31.4–37.4)
MCV RBC AUTO: 70 FL (ref 82–98)
MONOCYTES # BLD AUTO: 1.36 THOUSAND/ΜL (ref 0.17–1.22)
MONOCYTES NFR BLD AUTO: 16 % (ref 4–12)
NEUTROPHILS # BLD AUTO: 4.98 THOUSANDS/ΜL (ref 1.85–7.62)
NEUTS SEG NFR BLD AUTO: 59 % (ref 43–75)
PHOSPHATE SERPL-MCNC: 3.5 MG/DL (ref 2.7–4.5)
PLATELET # BLD AUTO: 325 THOUSANDS/UL (ref 149–390)
PMV BLD AUTO: 8.7 FL (ref 8.9–12.7)
POTASSIUM SERPL-SCNC: 3.5 MMOL/L (ref 3.5–5.3)
PROT SERPL-MCNC: 8.5 G/DL (ref 6.4–8.2)
RBC # BLD AUTO: 4.33 MILLION/UL (ref 3.88–5.62)
SODIUM SERPL-SCNC: 128 MMOL/L (ref 136–145)
WBC # BLD AUTO: 8.35 THOUSAND/UL (ref 4.31–10.16)

## 2017-03-11 PROCEDURE — 84100 ASSAY OF PHOSPHORUS: CPT | Performed by: INTERNAL MEDICINE

## 2017-03-11 PROCEDURE — 83735 ASSAY OF MAGNESIUM: CPT | Performed by: INTERNAL MEDICINE

## 2017-03-11 PROCEDURE — 83605 ASSAY OF LACTIC ACID: CPT | Performed by: INTERNAL MEDICINE

## 2017-03-11 PROCEDURE — 85025 COMPLETE CBC W/AUTO DIFF WBC: CPT | Performed by: INTERNAL MEDICINE

## 2017-03-11 PROCEDURE — 80053 COMPREHEN METABOLIC PANEL: CPT | Performed by: INTERNAL MEDICINE

## 2017-03-11 PROCEDURE — 83690 ASSAY OF LIPASE: CPT | Performed by: INTERNAL MEDICINE

## 2017-03-11 PROCEDURE — 84166 PROTEIN E-PHORESIS/URINE/CSF: CPT | Performed by: INTERNAL MEDICINE

## 2017-03-11 PROCEDURE — 76705 ECHO EXAM OF ABDOMEN: CPT

## 2017-03-11 PROCEDURE — C9113 INJ PANTOPRAZOLE SODIUM, VIA: HCPCS | Performed by: INTERNAL MEDICINE

## 2017-03-11 RX ORDER — POTASSIUM CHLORIDE 20 MEQ/1
40 TABLET, EXTENDED RELEASE ORAL ONCE
Status: COMPLETED | OUTPATIENT
Start: 2017-03-11 | End: 2017-03-11

## 2017-03-11 RX ORDER — CHLORDIAZEPOXIDE HYDROCHLORIDE 5 MG/1
5 CAPSULE, GELATIN COATED ORAL EVERY 6 HOURS SCHEDULED
Status: DISCONTINUED | OUTPATIENT
Start: 2017-03-11 | End: 2017-03-13 | Stop reason: HOSPADM

## 2017-03-11 RX ORDER — THIAMINE MONONITRATE (VIT B1) 100 MG
100 TABLET ORAL DAILY
Status: DISCONTINUED | OUTPATIENT
Start: 2017-03-12 | End: 2017-03-13 | Stop reason: HOSPADM

## 2017-03-11 RX ADMIN — NICOTINE 1 PATCH: 21 PATCH, EXTENDED RELEASE TRANSDERMAL at 09:02

## 2017-03-11 RX ADMIN — FOLIC ACID 1 MG: 1 TABLET ORAL at 09:01

## 2017-03-11 RX ADMIN — THIAMINE HYDROCHLORIDE 100 MG: 100 INJECTION, SOLUTION INTRAMUSCULAR; INTRAVENOUS at 09:03

## 2017-03-11 RX ADMIN — CHLORDIAZEPOXIDE HYDROCHLORIDE 10 MG: 5 CAPSULE ORAL at 00:18

## 2017-03-11 RX ADMIN — HYDROMORPHONE HYDROCHLORIDE 1 MG: 1 INJECTION, SOLUTION INTRAMUSCULAR; INTRAVENOUS; SUBCUTANEOUS at 05:15

## 2017-03-11 RX ADMIN — HYDROMORPHONE HYDROCHLORIDE 1 MG: 1 INJECTION, SOLUTION INTRAMUSCULAR; INTRAVENOUS; SUBCUTANEOUS at 00:18

## 2017-03-11 RX ADMIN — Medication 150 MG: at 09:01

## 2017-03-11 RX ADMIN — CHLORDIAZEPOXIDE HYDROCHLORIDE 5 MG: 5 CAPSULE ORAL at 11:39

## 2017-03-11 RX ADMIN — SODIUM CHLORIDE 100 ML/HR: 0.9 INJECTION, SOLUTION INTRAVENOUS at 21:05

## 2017-03-11 RX ADMIN — HYDROMORPHONE HYDROCHLORIDE 1 MG: 1 INJECTION, SOLUTION INTRAMUSCULAR; INTRAVENOUS; SUBCUTANEOUS at 21:40

## 2017-03-11 RX ADMIN — ENOXAPARIN SODIUM 40 MG: 40 INJECTION SUBCUTANEOUS at 08:59

## 2017-03-11 RX ADMIN — CHLORDIAZEPOXIDE HYDROCHLORIDE 5 MG: 5 CAPSULE ORAL at 18:23

## 2017-03-11 RX ADMIN — POTASSIUM CHLORIDE 40 MEQ: 1500 TABLET, EXTENDED RELEASE ORAL at 09:09

## 2017-03-11 RX ADMIN — HYDROMORPHONE HYDROCHLORIDE 1 MG: 1 INJECTION, SOLUTION INTRAMUSCULAR; INTRAVENOUS; SUBCUTANEOUS at 18:26

## 2017-03-11 RX ADMIN — HYDROXYZINE HYDROCHLORIDE 25 MG: 25 TABLET, FILM COATED ORAL at 11:39

## 2017-03-11 RX ADMIN — SODIUM CHLORIDE 1000 ML: 0.9 INJECTION, SOLUTION INTRAVENOUS at 11:11

## 2017-03-11 RX ADMIN — HYDROMORPHONE HYDROCHLORIDE 1 MG: 1 INJECTION, SOLUTION INTRAMUSCULAR; INTRAVENOUS; SUBCUTANEOUS at 09:10

## 2017-03-11 RX ADMIN — Medication 400 MG: at 18:24

## 2017-03-11 RX ADMIN — CHLORDIAZEPOXIDE HYDROCHLORIDE 10 MG: 5 CAPSULE ORAL at 05:10

## 2017-03-11 RX ADMIN — Medication 1 TABLET: at 09:02

## 2017-03-11 RX ADMIN — PANTOPRAZOLE SODIUM 40 MG: 40 INJECTION, POWDER, FOR SOLUTION INTRAVENOUS at 09:03

## 2017-03-11 RX ADMIN — Medication 400 MG: at 09:09

## 2017-03-12 ENCOUNTER — APPOINTMENT (INPATIENT)
Dept: CT IMAGING | Facility: HOSPITAL | Age: 52
DRG: 282 | End: 2017-03-12
Payer: COMMERCIAL

## 2017-03-12 LAB
ALBUMIN SERPL BCP-MCNC: 2.9 G/DL (ref 3.5–5)
ALP SERPL-CCNC: 225 U/L (ref 46–116)
ALT SERPL W P-5'-P-CCNC: 47 U/L (ref 12–78)
ANION GAP SERPL CALCULATED.3IONS-SCNC: 8 MMOL/L (ref 4–13)
AST SERPL W P-5'-P-CCNC: 82 U/L (ref 5–45)
BASOPHILS # BLD AUTO: 0.06 THOUSANDS/ΜL (ref 0–0.1)
BASOPHILS NFR BLD AUTO: 1 % (ref 0–1)
BILIRUB SERPL-MCNC: 0.4 MG/DL (ref 0.2–1)
BUN SERPL-MCNC: 3 MG/DL (ref 5–25)
CALCIUM SERPL-MCNC: 8.6 MG/DL (ref 8.3–10.1)
CHLORIDE SERPL-SCNC: 94 MMOL/L (ref 100–108)
CO2 SERPL-SCNC: 28 MMOL/L (ref 21–32)
CREAT SERPL-MCNC: 0.51 MG/DL (ref 0.6–1.3)
EOSINOPHIL # BLD AUTO: 0.26 THOUSAND/ΜL (ref 0–0.61)
EOSINOPHIL NFR BLD AUTO: 4 % (ref 0–6)
ERYTHROCYTE [DISTWIDTH] IN BLOOD BY AUTOMATED COUNT: 22.3 % (ref 11.6–15.1)
GFR SERPL CREATININE-BSD FRML MDRD: >60 ML/MIN/1.73SQ M
GLUCOSE SERPL-MCNC: 115 MG/DL (ref 65–140)
HCT VFR BLD AUTO: 28.8 % (ref 36.5–49.3)
HGB BLD-MCNC: 8.6 G/DL (ref 12–17)
LACTATE SERPL-SCNC: 1.6 MMOL/L (ref 0.5–2)
LIPASE SERPL-CCNC: 246 U/L (ref 73–393)
LYMPHOCYTES # BLD AUTO: 2.15 THOUSANDS/ΜL (ref 0.6–4.47)
LYMPHOCYTES NFR BLD AUTO: 29 % (ref 14–44)
MAGNESIUM SERPL-MCNC: 1.3 MG/DL (ref 1.6–2.6)
MCH RBC QN AUTO: 20.7 PG (ref 26.8–34.3)
MCHC RBC AUTO-ENTMCNC: 29.9 G/DL (ref 31.4–37.4)
MCV RBC AUTO: 69 FL (ref 82–98)
MONOCYTES # BLD AUTO: 1.43 THOUSAND/ΜL (ref 0.17–1.22)
MONOCYTES NFR BLD AUTO: 19 % (ref 4–12)
NEUTROPHILS # BLD AUTO: 3.57 THOUSANDS/ΜL (ref 1.85–7.62)
NEUTS SEG NFR BLD AUTO: 47 % (ref 43–75)
PHOSPHATE SERPL-MCNC: 3.8 MG/DL (ref 2.7–4.5)
PLATELET # BLD AUTO: 353 THOUSANDS/UL (ref 149–390)
PMV BLD AUTO: 9.1 FL (ref 8.9–12.7)
POTASSIUM SERPL-SCNC: 3.8 MMOL/L (ref 3.5–5.3)
PROT SERPL-MCNC: 8.2 G/DL (ref 6.4–8.2)
RBC # BLD AUTO: 4.15 MILLION/UL (ref 3.88–5.62)
SODIUM SERPL-SCNC: 130 MMOL/L (ref 136–145)
WBC # BLD AUTO: 7.47 THOUSAND/UL (ref 4.31–10.16)

## 2017-03-12 PROCEDURE — 84100 ASSAY OF PHOSPHORUS: CPT | Performed by: INTERNAL MEDICINE

## 2017-03-12 PROCEDURE — 83735 ASSAY OF MAGNESIUM: CPT | Performed by: INTERNAL MEDICINE

## 2017-03-12 PROCEDURE — 83605 ASSAY OF LACTIC ACID: CPT | Performed by: INTERNAL MEDICINE

## 2017-03-12 PROCEDURE — 86334 IMMUNOFIX E-PHORESIS SERUM: CPT

## 2017-03-12 PROCEDURE — C9113 INJ PANTOPRAZOLE SODIUM, VIA: HCPCS | Performed by: INTERNAL MEDICINE

## 2017-03-12 PROCEDURE — 74177 CT ABD & PELVIS W/CONTRAST: CPT

## 2017-03-12 PROCEDURE — 86334 IMMUNOFIX E-PHORESIS SERUM: CPT | Performed by: INTERNAL MEDICINE

## 2017-03-12 PROCEDURE — 84155 ASSAY OF PROTEIN SERUM: CPT

## 2017-03-12 PROCEDURE — 85025 COMPLETE CBC W/AUTO DIFF WBC: CPT | Performed by: INTERNAL MEDICINE

## 2017-03-12 PROCEDURE — 84165 PROTEIN E-PHORESIS SERUM: CPT | Performed by: INTERNAL MEDICINE

## 2017-03-12 PROCEDURE — 83690 ASSAY OF LIPASE: CPT | Performed by: INTERNAL MEDICINE

## 2017-03-12 PROCEDURE — 87389 HIV-1 AG W/HIV-1&-2 AB AG IA: CPT | Performed by: INTERNAL MEDICINE

## 2017-03-12 PROCEDURE — 80053 COMPREHEN METABOLIC PANEL: CPT | Performed by: INTERNAL MEDICINE

## 2017-03-12 RX ORDER — MAGNESIUM SULFATE HEPTAHYDRATE 40 MG/ML
4 INJECTION, SOLUTION INTRAVENOUS ONCE
Status: DISCONTINUED | OUTPATIENT
Start: 2017-03-12 | End: 2017-03-12 | Stop reason: SDUPTHER

## 2017-03-12 RX ORDER — MAGNESIUM SULFATE HEPTAHYDRATE 40 MG/ML
2 INJECTION, SOLUTION INTRAVENOUS
Status: COMPLETED | OUTPATIENT
Start: 2017-03-12 | End: 2017-03-12

## 2017-03-12 RX ADMIN — HYDROMORPHONE HYDROCHLORIDE 1 MG: 1 INJECTION, SOLUTION INTRAMUSCULAR; INTRAVENOUS; SUBCUTANEOUS at 03:14

## 2017-03-12 RX ADMIN — CHLORDIAZEPOXIDE HYDROCHLORIDE 5 MG: 5 CAPSULE ORAL at 03:14

## 2017-03-12 RX ADMIN — Medication 100 MG: at 08:38

## 2017-03-12 RX ADMIN — CHLORDIAZEPOXIDE HYDROCHLORIDE 5 MG: 5 CAPSULE ORAL at 14:25

## 2017-03-12 RX ADMIN — IOHEXOL 100 ML: 350 INJECTION, SOLUTION INTRAVENOUS at 12:19

## 2017-03-12 RX ADMIN — PANTOPRAZOLE SODIUM 40 MG: 40 INJECTION, POWDER, FOR SOLUTION INTRAVENOUS at 08:29

## 2017-03-12 RX ADMIN — MAGNESIUM SULFATE HEPTAHYDRATE 2 G: 40 INJECTION, SOLUTION INTRAVENOUS at 15:33

## 2017-03-12 RX ADMIN — NICOTINE 1 PATCH: 21 PATCH, EXTENDED RELEASE TRANSDERMAL at 08:29

## 2017-03-12 RX ADMIN — FOLIC ACID 1 MG: 1 TABLET ORAL at 08:27

## 2017-03-12 RX ADMIN — HYDROMORPHONE HYDROCHLORIDE 1 MG: 1 INJECTION, SOLUTION INTRAMUSCULAR; INTRAVENOUS; SUBCUTANEOUS at 08:33

## 2017-03-12 RX ADMIN — HYDROMORPHONE HYDROCHLORIDE 1 MG: 1 INJECTION, SOLUTION INTRAMUSCULAR; INTRAVENOUS; SUBCUTANEOUS at 15:36

## 2017-03-12 RX ADMIN — SODIUM CHLORIDE 100 ML/HR: 0.9 INJECTION, SOLUTION INTRAVENOUS at 08:38

## 2017-03-12 RX ADMIN — Medication 150 MG: at 08:28

## 2017-03-12 RX ADMIN — CHLORDIAZEPOXIDE HYDROCHLORIDE 5 MG: 5 CAPSULE ORAL at 20:15

## 2017-03-12 RX ADMIN — HYDROMORPHONE HYDROCHLORIDE 0.5 MG: 1 INJECTION, SOLUTION INTRAMUSCULAR; INTRAVENOUS; SUBCUTANEOUS at 10:45

## 2017-03-12 RX ADMIN — ENOXAPARIN SODIUM 40 MG: 40 INJECTION SUBCUTANEOUS at 08:26

## 2017-03-12 RX ADMIN — HYDROMORPHONE HYDROCHLORIDE 1 MG: 1 INJECTION, SOLUTION INTRAMUSCULAR; INTRAVENOUS; SUBCUTANEOUS at 20:15

## 2017-03-12 RX ADMIN — Medication 1 TABLET: at 08:28

## 2017-03-12 RX ADMIN — MAGNESIUM SULFATE HEPTAHYDRATE 2 G: 40 INJECTION, SOLUTION INTRAVENOUS at 13:18

## 2017-03-12 RX ADMIN — HYDROXYZINE HYDROCHLORIDE 25 MG: 25 TABLET, FILM COATED ORAL at 14:25

## 2017-03-12 RX ADMIN — CHLORDIAZEPOXIDE HYDROCHLORIDE 5 MG: 5 CAPSULE ORAL at 08:26

## 2017-03-13 ENCOUNTER — APPOINTMENT (INPATIENT)
Dept: PHYSICAL THERAPY | Facility: HOSPITAL | Age: 52
DRG: 282 | End: 2017-03-13
Payer: COMMERCIAL

## 2017-03-13 ENCOUNTER — GENERIC CONVERSION - ENCOUNTER (OUTPATIENT)
Dept: OTHER | Facility: OTHER | Age: 52
End: 2017-03-13

## 2017-03-13 VITALS
OXYGEN SATURATION: 100 % | TEMPERATURE: 98.1 F | RESPIRATION RATE: 18 BRPM | HEIGHT: 68 IN | HEART RATE: 88 BPM | SYSTOLIC BLOOD PRESSURE: 160 MMHG | WEIGHT: 128.53 LBS | DIASTOLIC BLOOD PRESSURE: 81 MMHG | BODY MASS INDEX: 19.48 KG/M2

## 2017-03-13 PROBLEM — E87.2 LACTIC ACIDOSIS: Status: RESOLVED | Noted: 2017-03-11 | Resolved: 2017-03-13

## 2017-03-13 PROBLEM — E83.39 HYPOPHOSPHATEMIA: Status: RESOLVED | Noted: 2017-03-09 | Resolved: 2017-03-13

## 2017-03-13 PROBLEM — E87.20 LACTIC ACIDOSIS: Status: RESOLVED | Noted: 2017-03-11 | Resolved: 2017-03-13

## 2017-03-13 PROBLEM — K43.9 VENTRAL HERNIA: Status: RESOLVED | Noted: 2017-01-11 | Resolved: 2017-03-13

## 2017-03-13 PROBLEM — R74.8 ELEVATED ALKALINE PHOSPHATASE LEVEL: Status: RESOLVED | Noted: 2017-01-14 | Resolved: 2017-03-13

## 2017-03-13 PROBLEM — R10.9 INTRACTABLE ABDOMINAL PAIN: Status: RESOLVED | Noted: 2017-01-11 | Resolved: 2017-03-13

## 2017-03-13 PROBLEM — Q64.79 STRUCTURAL ABNORMALITY OF BLADDER: Status: RESOLVED | Noted: 2017-03-09 | Resolved: 2017-03-13

## 2017-03-13 LAB
ALBUMIN SERPL BCP-MCNC: 2.9 G/DL (ref 3.5–5)
ALP SERPL-CCNC: 202 U/L (ref 46–116)
ALT SERPL W P-5'-P-CCNC: 39 U/L (ref 12–78)
ANION GAP SERPL CALCULATED.3IONS-SCNC: 8 MMOL/L (ref 4–13)
ANISOCYTOSIS BLD QL SMEAR: PRESENT
AST SERPL W P-5'-P-CCNC: 61 U/L (ref 5–45)
BASOPHILS # BLD MANUAL: 0 THOUSAND/UL (ref 0–0.1)
BASOPHILS NFR MAR MANUAL: 0 % (ref 0–1)
BILIRUB SERPL-MCNC: 0.5 MG/DL (ref 0.2–1)
BUN SERPL-MCNC: 3 MG/DL (ref 5–25)
CALCIUM SERPL-MCNC: 9 MG/DL (ref 8.3–10.1)
CHLORIDE SERPL-SCNC: 95 MMOL/L (ref 100–108)
CO2 SERPL-SCNC: 29 MMOL/L (ref 21–32)
CREAT SERPL-MCNC: 0.51 MG/DL (ref 0.6–1.3)
EOSINOPHIL # BLD MANUAL: 0 THOUSAND/UL (ref 0–0.4)
EOSINOPHIL NFR BLD MANUAL: 0 % (ref 0–6)
ERYTHROCYTE [DISTWIDTH] IN BLOOD BY AUTOMATED COUNT: 22.5 % (ref 11.6–15.1)
GFR SERPL CREATININE-BSD FRML MDRD: >60 ML/MIN/1.73SQ M
GLUCOSE SERPL-MCNC: 97 MG/DL (ref 65–140)
HCT VFR BLD AUTO: 28.4 % (ref 36.5–49.3)
HGB BLD-MCNC: 8.5 G/DL (ref 12–17)
HIV 1+2 AB+HIV1 P24 AG SERPL QL IA: NORMAL
HYPERCHROMIA BLD QL SMEAR: PRESENT
LACTATE SERPL-SCNC: 0.9 MMOL/L (ref 0.5–2)
LIPASE SERPL-CCNC: 134 U/L (ref 73–393)
LYMPHOCYTES # BLD AUTO: 1.59 THOUSAND/UL (ref 0.6–4.47)
LYMPHOCYTES # BLD AUTO: 23 % (ref 14–44)
MAGNESIUM SERPL-MCNC: 1.7 MG/DL (ref 1.6–2.6)
MCH RBC QN AUTO: 20.6 PG (ref 26.8–34.3)
MCHC RBC AUTO-ENTMCNC: 29.9 G/DL (ref 31.4–37.4)
MCV RBC AUTO: 69 FL (ref 82–98)
MONOCYTES # BLD AUTO: 0.69 THOUSAND/UL (ref 0–1.22)
MONOCYTES NFR BLD: 10 % (ref 4–12)
NEUTROPHILS # BLD MANUAL: 4.42 THOUSAND/UL (ref 1.85–7.62)
NEUTS SEG NFR BLD AUTO: 64 % (ref 43–75)
PHOSPHATE SERPL-MCNC: 4.6 MG/DL (ref 2.7–4.5)
PLATELET # BLD AUTO: 362 THOUSANDS/UL (ref 149–390)
PLATELET BLD QL SMEAR: ADEQUATE
PMV BLD AUTO: 9.1 FL (ref 8.9–12.7)
POIKILOCYTOSIS BLD QL SMEAR: PRESENT
POTASSIUM SERPL-SCNC: 3.9 MMOL/L (ref 3.5–5.3)
PROT SERPL-MCNC: 8.4 G/DL (ref 6.4–8.2)
RBC # BLD AUTO: 4.13 MILLION/UL (ref 3.88–5.62)
SODIUM SERPL-SCNC: 132 MMOL/L (ref 136–145)
TARGETS BLD QL SMEAR: PRESENT
TOTAL CELLS COUNTED SPEC: 100
VARIANT LYMPHS # BLD AUTO: 3 %
WBC # BLD AUTO: 6.9 THOUSAND/UL (ref 4.31–10.16)

## 2017-03-13 PROCEDURE — C9113 INJ PANTOPRAZOLE SODIUM, VIA: HCPCS | Performed by: INTERNAL MEDICINE

## 2017-03-13 PROCEDURE — 83735 ASSAY OF MAGNESIUM: CPT | Performed by: INTERNAL MEDICINE

## 2017-03-13 PROCEDURE — 80053 COMPREHEN METABOLIC PANEL: CPT | Performed by: INTERNAL MEDICINE

## 2017-03-13 PROCEDURE — 84100 ASSAY OF PHOSPHORUS: CPT | Performed by: INTERNAL MEDICINE

## 2017-03-13 PROCEDURE — 83605 ASSAY OF LACTIC ACID: CPT | Performed by: INTERNAL MEDICINE

## 2017-03-13 PROCEDURE — 83690 ASSAY OF LIPASE: CPT | Performed by: INTERNAL MEDICINE

## 2017-03-13 PROCEDURE — 85027 COMPLETE CBC AUTOMATED: CPT | Performed by: INTERNAL MEDICINE

## 2017-03-13 PROCEDURE — 85007 BL SMEAR W/DIFF WBC COUNT: CPT | Performed by: INTERNAL MEDICINE

## 2017-03-13 RX ORDER — CHLORDIAZEPOXIDE HYDROCHLORIDE 5 MG/1
5 CAPSULE, GELATIN COATED ORAL EVERY 8 HOURS
Qty: 30 CAPSULE | Refills: 0 | Status: SHIPPED | OUTPATIENT
Start: 2017-03-13 | End: 2017-05-19 | Stop reason: HOSPADM

## 2017-03-13 RX ORDER — ECHINACEA PURPUREA EXTRACT 125 MG
1 TABLET ORAL EVERY 2 HOUR PRN
Status: DISCONTINUED | OUTPATIENT
Start: 2017-03-13 | End: 2017-03-13 | Stop reason: HOSPADM

## 2017-03-13 RX ORDER — ERGOCALCIFEROL 1.25 MG/1
50000 CAPSULE ORAL WEEKLY
Qty: 4 CAPSULE | Refills: 0 | Status: SHIPPED | OUTPATIENT
Start: 2017-03-13 | End: 2017-07-28 | Stop reason: ALTCHOICE

## 2017-03-13 RX ADMIN — PANTOPRAZOLE SODIUM 40 MG: 40 INJECTION, POWDER, FOR SOLUTION INTRAVENOUS at 09:04

## 2017-03-13 RX ADMIN — CHLORDIAZEPOXIDE HYDROCHLORIDE 5 MG: 5 CAPSULE ORAL at 09:02

## 2017-03-13 RX ADMIN — HYDROMORPHONE HYDROCHLORIDE 1 MG: 1 INJECTION, SOLUTION INTRAMUSCULAR; INTRAVENOUS; SUBCUTANEOUS at 04:43

## 2017-03-13 RX ADMIN — SODIUM CHLORIDE 125 ML/HR: 0.9 INJECTION, SOLUTION INTRAVENOUS at 09:16

## 2017-03-13 RX ADMIN — HYDROMORPHONE HYDROCHLORIDE 1 MG: 1 INJECTION, SOLUTION INTRAMUSCULAR; INTRAVENOUS; SUBCUTANEOUS at 00:25

## 2017-03-13 RX ADMIN — FOLIC ACID 1 MG: 1 TABLET ORAL at 09:03

## 2017-03-13 RX ADMIN — Medication 150 MG: at 09:03

## 2017-03-13 RX ADMIN — CHLORDIAZEPOXIDE HYDROCHLORIDE 5 MG: 5 CAPSULE ORAL at 02:23

## 2017-03-13 RX ADMIN — Medication 100 MG: at 09:04

## 2017-03-13 RX ADMIN — NICOTINE 1 PATCH: 21 PATCH, EXTENDED RELEASE TRANSDERMAL at 09:06

## 2017-03-13 RX ADMIN — Medication 1 TABLET: at 09:04

## 2017-03-13 RX ADMIN — ENOXAPARIN SODIUM 40 MG: 40 INJECTION SUBCUTANEOUS at 09:02

## 2017-03-13 RX ADMIN — SODIUM CHLORIDE 125 ML/HR: 0.9 INJECTION, SOLUTION INTRAVENOUS at 00:21

## 2017-03-15 LAB
ALBUMIN SERPL ELPH-MCNC: 3.3 G/DL (ref 3.5–5)
ALBUMIN SERPL ELPH-MCNC: 40.7 % (ref 52–65)
ALBUMIN UR ELPH-MCNC: 100 %
ALPHA1 GLOB MFR UR ELPH: 0 %
ALPHA1 GLOB SERPL ELPH-MCNC: 0.39 G/DL (ref 0.1–0.4)
ALPHA1 GLOB SERPL ELPH-MCNC: 4.8 % (ref 2.5–5)
ALPHA2 GLOB MFR UR ELPH: 0 %
ALPHA2 GLOB SERPL ELPH-MCNC: 0.81 G/DL (ref 0.4–1.2)
ALPHA2 GLOB SERPL ELPH-MCNC: 10 % (ref 7–13)
B-GLOBULIN MFR UR ELPH: 0 %
BETA GLOB ABNORMAL SERPL ELPH-MCNC: 0.63 G/DL (ref 0.4–0.8)
BETA1 GLOB SERPL ELPH-MCNC: 7.8 % (ref 5–13)
BETA2 GLOB SERPL ELPH-MCNC: 9.7 % (ref 2–8)
BETA2+GAMMA GLOB SERPL ELPH-MCNC: 0.79 G/DL (ref 0.2–0.5)
GAMMA GLOB ABNORMAL SERPL ELPH-MCNC: 2.19 G/DL (ref 0.5–1.6)
GAMMA GLOB MFR UR ELPH: 0 %
GAMMA GLOB SERPL ELPH-MCNC: 27 % (ref 12–22)
IGG/ALB SER: 0.69 {RATIO} (ref 1.1–1.8)
INTERPRETATION UR IFE-IMP: NORMAL
PROT PATTERN UR ELPH-IMP: NORMAL
PROT SERPL-MCNC: 8.1 G/DL (ref 6.4–8.2)
PROT UR-MCNC: 6 MG/DL

## 2017-03-19 LAB — MISCELLANEOUS LAB TEST RESULT: NORMAL

## 2017-04-22 ENCOUNTER — APPOINTMENT (EMERGENCY)
Dept: RADIOLOGY | Facility: HOSPITAL | Age: 52
End: 2017-04-22
Payer: COMMERCIAL

## 2017-04-22 ENCOUNTER — HOSPITAL ENCOUNTER (EMERGENCY)
Facility: HOSPITAL | Age: 52
Discharge: HOME | End: 2017-04-22
Attending: EMERGENCY MEDICINE | Admitting: EMERGENCY MEDICINE
Payer: COMMERCIAL

## 2017-04-22 ENCOUNTER — APPOINTMENT (EMERGENCY)
Dept: CT IMAGING | Facility: HOSPITAL | Age: 52
End: 2017-04-22
Payer: COMMERCIAL

## 2017-04-22 VITALS
WEIGHT: 143.08 LBS | BODY MASS INDEX: 21.68 KG/M2 | SYSTOLIC BLOOD PRESSURE: 142 MMHG | HEART RATE: 92 BPM | DIASTOLIC BLOOD PRESSURE: 94 MMHG | OXYGEN SATURATION: 95 % | TEMPERATURE: 97.8 F | HEIGHT: 68 IN | RESPIRATION RATE: 18 BRPM

## 2017-04-22 DIAGNOSIS — S52.202A RADIUS/ULNA FRACTURE, LEFT, CLOSED, INITIAL ENCOUNTER: Primary | ICD-10-CM

## 2017-04-22 DIAGNOSIS — S52.92XA RADIUS/ULNA FRACTURE, LEFT, CLOSED, INITIAL ENCOUNTER: Primary | ICD-10-CM

## 2017-04-22 LAB
ALBUMIN SERPL BCP-MCNC: 3.4 G/DL (ref 3.5–5)
ALP SERPL-CCNC: 258 U/L (ref 46–116)
ALT SERPL W P-5'-P-CCNC: 43 U/L (ref 12–78)
ANION GAP SERPL CALCULATED.3IONS-SCNC: 9 MMOL/L (ref 4–13)
APTT PPP: 31 SECONDS (ref 24–36)
AST SERPL W P-5'-P-CCNC: 170 U/L (ref 5–45)
BASOPHILS # BLD AUTO: 0.05 THOUSANDS/ΜL (ref 0–0.1)
BASOPHILS NFR BLD AUTO: 1 % (ref 0–1)
BILIRUB DIRECT SERPL-MCNC: 0.05 MG/DL (ref 0–0.2)
BILIRUB SERPL-MCNC: 0.41 MG/DL (ref 0.2–1)
BUN SERPL-MCNC: 5 MG/DL (ref 5–25)
CALCIUM SERPL-MCNC: 8.4 MG/DL (ref 8.3–10.1)
CHLORIDE SERPL-SCNC: 92 MMOL/L (ref 100–108)
CO2 SERPL-SCNC: 30 MMOL/L (ref 21–32)
CREAT SERPL-MCNC: 0.26 MG/DL (ref 0.6–1.3)
EOSINOPHIL # BLD AUTO: 0.28 THOUSAND/ΜL (ref 0–0.61)
EOSINOPHIL NFR BLD AUTO: 5 % (ref 0–6)
ERYTHROCYTE [DISTWIDTH] IN BLOOD BY AUTOMATED COUNT: 22.8 % (ref 11.6–15.1)
GFR SERPL CREATININE-BSD FRML MDRD: >60 ML/MIN/1.73SQ M
GLUCOSE SERPL-MCNC: 96 MG/DL (ref 65–140)
HCT VFR BLD AUTO: 29.1 % (ref 36.5–49.3)
HGB BLD-MCNC: 8.9 G/DL (ref 12–17)
HOLD SPECIMEN: NO
INR PPP: 1.01 (ref 0.86–1.16)
LYMPHOCYTES # BLD AUTO: 2.68 THOUSANDS/ΜL (ref 0.6–4.47)
LYMPHOCYTES NFR BLD AUTO: 47 % (ref 14–44)
MCH RBC QN AUTO: 21.2 PG (ref 26.8–34.3)
MCHC RBC AUTO-ENTMCNC: 30.6 G/DL (ref 31.4–37.4)
MCV RBC AUTO: 69 FL (ref 82–98)
MONOCYTES # BLD AUTO: 0.7 THOUSAND/ΜL (ref 0.17–1.22)
MONOCYTES NFR BLD AUTO: 12 % (ref 4–12)
NEUTROPHILS # BLD AUTO: 2.01 THOUSANDS/ΜL (ref 1.85–7.62)
NEUTS SEG NFR BLD AUTO: 35 % (ref 43–75)
PLATELET # BLD AUTO: 186 THOUSANDS/UL (ref 149–390)
PMV BLD AUTO: 9.6 FL (ref 8.9–12.7)
POTASSIUM SERPL-SCNC: 5.5 MMOL/L (ref 3.5–5.3)
PROT SERPL-MCNC: 8.8 G/DL (ref 6.4–8.2)
PROTHROMBIN TIME: 13 SECONDS (ref 12–14.3)
RBC # BLD AUTO: 4.2 MILLION/UL (ref 3.88–5.62)
SODIUM SERPL-SCNC: 131 MMOL/L (ref 136–145)
WBC # BLD AUTO: 5.72 THOUSAND/UL (ref 4.31–10.16)

## 2017-04-22 PROCEDURE — 73110 X-RAY EXAM OF WRIST: CPT

## 2017-04-22 PROCEDURE — 85610 PROTHROMBIN TIME: CPT | Performed by: PHYSICIAN ASSISTANT

## 2017-04-22 PROCEDURE — 73080 X-RAY EXAM OF ELBOW: CPT

## 2017-04-22 PROCEDURE — 74176 CT ABD & PELVIS W/O CONTRAST: CPT

## 2017-04-22 PROCEDURE — 85025 COMPLETE CBC W/AUTO DIFF WBC: CPT | Performed by: PHYSICIAN ASSISTANT

## 2017-04-22 PROCEDURE — 73564 X-RAY EXAM KNEE 4 OR MORE: CPT

## 2017-04-22 PROCEDURE — 80076 HEPATIC FUNCTION PANEL: CPT

## 2017-04-22 PROCEDURE — 85730 THROMBOPLASTIN TIME PARTIAL: CPT

## 2017-04-22 PROCEDURE — 72125 CT NECK SPINE W/O DYE: CPT

## 2017-04-22 PROCEDURE — 99284 EMERGENCY DEPT VISIT MOD MDM: CPT

## 2017-04-22 PROCEDURE — 36415 COLL VENOUS BLD VENIPUNCTURE: CPT | Performed by: PHYSICIAN ASSISTANT

## 2017-04-22 PROCEDURE — 96375 TX/PRO/DX INJ NEW DRUG ADDON: CPT

## 2017-04-22 PROCEDURE — 70450 CT HEAD/BRAIN W/O DYE: CPT

## 2017-04-22 PROCEDURE — 80048 BASIC METABOLIC PNL TOTAL CA: CPT | Performed by: PHYSICIAN ASSISTANT

## 2017-04-22 PROCEDURE — 96374 THER/PROPH/DIAG INJ IV PUSH: CPT

## 2017-04-22 RX ORDER — MORPHINE SULFATE 15 MG/1
7.5 TABLET ORAL EVERY 6 HOURS PRN
Qty: 8 TABLET | Refills: 0 | Status: SHIPPED | OUTPATIENT
Start: 2017-04-22 | End: 2017-05-02

## 2017-04-22 RX ORDER — FLUTICASONE PROPIONATE 50 MCG
2 SPRAY, SUSPENSION (ML) NASAL DAILY
COMMUNITY
End: 2017-07-28 | Stop reason: ALTCHOICE

## 2017-04-22 RX ORDER — MORPHINE SULFATE 4 MG/ML
4 INJECTION, SOLUTION INTRAMUSCULAR; INTRAVENOUS ONCE
Status: COMPLETED | OUTPATIENT
Start: 2017-04-22 | End: 2017-04-22

## 2017-04-22 RX ADMIN — SODIUM CHLORIDE 1000 ML: 0.9 INJECTION, SOLUTION INTRAVENOUS at 22:32

## 2017-04-22 RX ADMIN — MORPHINE SULFATE 4 MG: 4 INJECTION, SOLUTION INTRAMUSCULAR; INTRAVENOUS at 21:54

## 2017-04-22 RX ADMIN — HYDROMORPHONE HYDROCHLORIDE 1 MG: 1 INJECTION, SOLUTION INTRAMUSCULAR; INTRAVENOUS; SUBCUTANEOUS at 22:31

## 2017-05-17 ENCOUNTER — APPOINTMENT (EMERGENCY)
Dept: RADIOLOGY | Facility: HOSPITAL | Age: 52
End: 2017-05-17
Payer: COMMERCIAL

## 2017-05-17 ENCOUNTER — HOSPITAL ENCOUNTER (INPATIENT)
Facility: HOSPITAL | Age: 52
LOS: 2 days | Discharge: HOME | End: 2017-05-19
Attending: EMERGENCY MEDICINE | Admitting: INTERNAL MEDICINE
Payer: COMMERCIAL

## 2017-05-17 ENCOUNTER — APPOINTMENT (EMERGENCY)
Dept: CT IMAGING | Facility: HOSPITAL | Age: 52
End: 2017-05-17
Payer: COMMERCIAL

## 2017-05-17 DIAGNOSIS — S31.109A CHRONIC ABDOMINAL WOUND INFECTION: ICD-10-CM

## 2017-05-17 DIAGNOSIS — R07.9 CHEST PAIN: Primary | ICD-10-CM

## 2017-05-17 DIAGNOSIS — L08.9 CHRONIC ABDOMINAL WOUND INFECTION: ICD-10-CM

## 2017-05-17 PROBLEM — E83.42 HYPOMAGNESEMIA: Status: RESOLVED | Noted: 2017-01-12 | Resolved: 2017-05-17

## 2017-05-17 PROBLEM — K80.50 COMMON BILE DUCT STONE: Status: RESOLVED | Noted: 2017-03-07 | Resolved: 2017-05-17

## 2017-05-17 PROBLEM — R53.1 GENERALIZED WEAKNESS: Status: ACTIVE | Noted: 2017-05-17

## 2017-05-17 PROBLEM — E87.6 HYPOKALEMIA: Status: RESOLVED | Noted: 2017-01-11 | Resolved: 2017-05-17

## 2017-05-17 PROBLEM — F10.231 DELIRIUM TREMENS (HCC): Status: RESOLVED | Noted: 2017-03-07 | Resolved: 2017-05-17

## 2017-05-17 PROBLEM — N32.89 DISTENDED BLADDER: Status: RESOLVED | Noted: 2017-03-07 | Resolved: 2017-05-17

## 2017-05-17 PROBLEM — R77.8 ELEVATED TOTAL PROTEIN: Status: RESOLVED | Noted: 2017-03-11 | Resolved: 2017-05-17

## 2017-05-17 PROBLEM — K80.50 CHOLEDOCHOLITHIASIS: Status: RESOLVED | Noted: 2017-01-11 | Resolved: 2017-05-17

## 2017-05-17 PROBLEM — F10.929 ALCOHOL INTOXICATION (HCC): Status: ACTIVE | Noted: 2017-05-17

## 2017-05-17 PROBLEM — K85.90 ACUTE PANCREATITIS: Status: RESOLVED | Noted: 2017-01-11 | Resolved: 2017-05-17

## 2017-05-17 PROBLEM — M79.671 RIGHT FOOT PAIN: Status: RESOLVED | Noted: 2017-03-08 | Resolved: 2017-05-17

## 2017-05-17 PROBLEM — K29.80 DUODENITIS: Status: RESOLVED | Noted: 2017-03-07 | Resolved: 2017-05-17

## 2017-05-17 PROBLEM — F10.931 DELIRIUM TREMENS (HCC): Status: RESOLVED | Noted: 2017-03-07 | Resolved: 2017-05-17

## 2017-05-17 PROBLEM — K29.70 GASTRITIS: Status: RESOLVED | Noted: 2017-03-07 | Resolved: 2017-05-17

## 2017-05-17 PROBLEM — R33.9 URINARY RETENTION: Status: RESOLVED | Noted: 2017-03-08 | Resolved: 2017-05-17

## 2017-05-17 LAB
ALBUMIN SERPL BCP-MCNC: 3.1 G/DL (ref 3.5–5)
ALP SERPL-CCNC: 269 U/L (ref 46–116)
ALT SERPL W P-5'-P-CCNC: 32 U/L (ref 12–78)
AMMONIA PLAS-SCNC: 21 UMOL/L (ref 11–35)
ANION GAP SERPL CALCULATED.3IONS-SCNC: 11 MMOL/L (ref 4–13)
APTT PPP: 33 SECONDS (ref 23–35)
AST SERPL W P-5'-P-CCNC: 98 U/L (ref 5–45)
ATRIAL RATE: 73 BPM
BASOPHILS # BLD AUTO: 0.1 THOUSANDS/ΜL (ref 0–0.1)
BASOPHILS NFR BLD AUTO: 2 % (ref 0–1)
BILIRUB SERPL-MCNC: 0.3 MG/DL (ref 0.2–1)
BUN SERPL-MCNC: 3 MG/DL (ref 5–25)
CALCIUM SERPL-MCNC: 7.9 MG/DL (ref 8.3–10.1)
CHLORIDE SERPL-SCNC: 93 MMOL/L (ref 100–108)
CO2 SERPL-SCNC: 26 MMOL/L (ref 21–32)
CREAT SERPL-MCNC: 0.45 MG/DL (ref 0.6–1.3)
EOSINOPHIL # BLD AUTO: 0.29 THOUSAND/ΜL (ref 0–0.61)
EOSINOPHIL NFR BLD AUTO: 5 % (ref 0–6)
ERYTHROCYTE [DISTWIDTH] IN BLOOD BY AUTOMATED COUNT: 21.6 % (ref 11.6–15.1)
ETHANOL SERPL-MCNC: 375 MG/DL (ref 0–3)
GFR SERPL CREATININE-BSD FRML MDRD: >60 ML/MIN/1.73SQ M
GLUCOSE SERPL-MCNC: 89 MG/DL (ref 65–140)
HCT VFR BLD AUTO: 31.7 % (ref 36.5–49.3)
HGB BLD-MCNC: 9.6 G/DL (ref 12–17)
INR PPP: 0.96 (ref 0.86–1.16)
LYMPHOCYTES # BLD AUTO: 2.56 THOUSANDS/ΜL (ref 0.6–4.47)
LYMPHOCYTES NFR BLD AUTO: 45 % (ref 14–44)
MCH RBC QN AUTO: 21.4 PG (ref 26.8–34.3)
MCHC RBC AUTO-ENTMCNC: 30.3 G/DL (ref 31.4–37.4)
MCV RBC AUTO: 71 FL (ref 82–98)
MONOCYTES # BLD AUTO: 0.64 THOUSAND/ΜL (ref 0.17–1.22)
MONOCYTES NFR BLD AUTO: 11 % (ref 4–12)
NEUTROPHILS # BLD AUTO: 2.09 THOUSANDS/ΜL (ref 1.85–7.62)
NEUTS SEG NFR BLD AUTO: 37 % (ref 43–75)
P AXIS: 62 DEGREES
PLATELET # BLD AUTO: 339 THOUSANDS/UL (ref 149–390)
PMV BLD AUTO: 8.8 FL (ref 8.9–12.7)
POTASSIUM SERPL-SCNC: 3.9 MMOL/L (ref 3.5–5.3)
PR INTERVAL: 164 MS
PROT SERPL-MCNC: 7.9 G/DL (ref 6.4–8.2)
PROTHROMBIN TIME: 12.7 SECONDS (ref 12.1–14.4)
QRS AXIS: -29 DEGREES
QRSD INTERVAL: 92 MS
QT INTERVAL: 414 MS
QTC INTERVAL: 456 MS
RBC # BLD AUTO: 4.48 MILLION/UL (ref 3.88–5.62)
SODIUM SERPL-SCNC: 130 MMOL/L (ref 136–145)
T WAVE AXIS: 45 DEGREES
TROPONIN I SERPL-MCNC: <0.02 NG/ML
VENTRICULAR RATE: 73 BPM
WBC # BLD AUTO: 5.68 THOUSAND/UL (ref 4.31–10.16)

## 2017-05-17 PROCEDURE — 93005 ELECTROCARDIOGRAM TRACING: CPT | Performed by: EMERGENCY MEDICINE

## 2017-05-17 PROCEDURE — 36415 COLL VENOUS BLD VENIPUNCTURE: CPT | Performed by: EMERGENCY MEDICINE

## 2017-05-17 PROCEDURE — 85730 THROMBOPLASTIN TIME PARTIAL: CPT | Performed by: EMERGENCY MEDICINE

## 2017-05-17 PROCEDURE — 85610 PROTHROMBIN TIME: CPT | Performed by: EMERGENCY MEDICINE

## 2017-05-17 PROCEDURE — 0H57XZZ DESTRUCTION OF ABDOMEN SKIN, EXTERNAL APPROACH: ICD-10-PCS | Performed by: SURGERY

## 2017-05-17 PROCEDURE — 84484 ASSAY OF TROPONIN QUANT: CPT | Performed by: EMERGENCY MEDICINE

## 2017-05-17 PROCEDURE — 85025 COMPLETE CBC W/AUTO DIFF WBC: CPT | Performed by: EMERGENCY MEDICINE

## 2017-05-17 PROCEDURE — 94760 N-INVAS EAR/PLS OXIMETRY 1: CPT

## 2017-05-17 PROCEDURE — 70450 CT HEAD/BRAIN W/O DYE: CPT

## 2017-05-17 PROCEDURE — 94664 DEMO&/EVAL PT USE INHALER: CPT

## 2017-05-17 PROCEDURE — 84484 ASSAY OF TROPONIN QUANT: CPT | Performed by: INTERNAL MEDICINE

## 2017-05-17 PROCEDURE — 87081 CULTURE SCREEN ONLY: CPT | Performed by: INTERNAL MEDICINE

## 2017-05-17 PROCEDURE — 80053 COMPREHEN METABOLIC PANEL: CPT | Performed by: EMERGENCY MEDICINE

## 2017-05-17 PROCEDURE — 71020 HB CHEST X-RAY 2VW FRONTAL&LATL: CPT

## 2017-05-17 PROCEDURE — 82140 ASSAY OF AMMONIA: CPT | Performed by: EMERGENCY MEDICINE

## 2017-05-17 PROCEDURE — 99285 EMERGENCY DEPT VISIT HI MDM: CPT

## 2017-05-17 PROCEDURE — 80320 DRUG SCREEN QUANTALCOHOLS: CPT | Performed by: EMERGENCY MEDICINE

## 2017-05-17 RX ORDER — FLUOXETINE 10 MG/1
10 CAPSULE ORAL DAILY
Status: DISCONTINUED | OUTPATIENT
Start: 2017-05-18 | End: 2017-05-19 | Stop reason: HOSPADM

## 2017-05-17 RX ORDER — FOLIC ACID 1 MG/1
1 TABLET ORAL DAILY
Status: DISCONTINUED | OUTPATIENT
Start: 2017-05-17 | End: 2017-05-19 | Stop reason: HOSPADM

## 2017-05-17 RX ORDER — PANTOPRAZOLE SODIUM 40 MG/1
40 TABLET, DELAYED RELEASE ORAL
Status: DISCONTINUED | OUTPATIENT
Start: 2017-05-18 | End: 2017-05-19 | Stop reason: HOSPADM

## 2017-05-17 RX ORDER — IRON POLYSACCHARIDE COMPLEX 150 MG
150 CAPSULE ORAL 2 TIMES DAILY
Status: DISCONTINUED | OUTPATIENT
Start: 2017-05-17 | End: 2017-05-19 | Stop reason: HOSPADM

## 2017-05-17 RX ORDER — MORPHINE SULFATE 2 MG/ML
2 INJECTION, SOLUTION INTRAMUSCULAR; INTRAVENOUS EVERY 4 HOURS PRN
Status: DISCONTINUED | OUTPATIENT
Start: 2017-05-17 | End: 2017-05-19 | Stop reason: HOSPADM

## 2017-05-17 RX ORDER — FLUTICASONE PROPIONATE 50 MCG
1 SPRAY, SUSPENSION (ML) NASAL DAILY
Status: DISCONTINUED | OUTPATIENT
Start: 2017-05-18 | End: 2017-05-19 | Stop reason: HOSPADM

## 2017-05-17 RX ORDER — FLUOXETINE HYDROCHLORIDE 20 MG/1
20 CAPSULE ORAL DAILY
Status: DISCONTINUED | OUTPATIENT
Start: 2017-05-18 | End: 2017-05-17

## 2017-05-17 RX ORDER — ONDANSETRON 2 MG/ML
4 INJECTION INTRAMUSCULAR; INTRAVENOUS EVERY 6 HOURS PRN
Status: DISCONTINUED | OUTPATIENT
Start: 2017-05-17 | End: 2017-05-19 | Stop reason: HOSPADM

## 2017-05-17 RX ORDER — LORAZEPAM 2 MG/ML
1 INJECTION INTRAMUSCULAR EVERY 4 HOURS PRN
Status: DISCONTINUED | OUTPATIENT
Start: 2017-05-17 | End: 2017-05-19 | Stop reason: HOSPADM

## 2017-05-17 RX ORDER — CHOLECALCIFEROL (VITAMIN D3) 125 MCG
250 CAPSULE ORAL DAILY
Status: DISCONTINUED | OUTPATIENT
Start: 2017-05-18 | End: 2017-05-19 | Stop reason: HOSPADM

## 2017-05-17 RX ORDER — LORATADINE 10 MG/1
10 TABLET ORAL DAILY
Status: DISCONTINUED | OUTPATIENT
Start: 2017-05-18 | End: 2017-05-19 | Stop reason: HOSPADM

## 2017-05-17 RX ORDER — OXYCODONE HYDROCHLORIDE 5 MG/1
5 TABLET ORAL EVERY 4 HOURS PRN
Status: DISCONTINUED | OUTPATIENT
Start: 2017-05-17 | End: 2017-05-19 | Stop reason: HOSPADM

## 2017-05-17 RX ORDER — MELATONIN
1000 DAILY
Status: DISCONTINUED | OUTPATIENT
Start: 2017-05-18 | End: 2017-05-19 | Stop reason: HOSPADM

## 2017-05-17 RX ORDER — FOLIC ACID 1 MG/1
1 TABLET ORAL DAILY
Status: DISCONTINUED | OUTPATIENT
Start: 2017-05-18 | End: 2017-05-17 | Stop reason: SDUPTHER

## 2017-05-17 RX ORDER — NICOTINE 21 MG/24HR
1 PATCH, TRANSDERMAL 24 HOURS TRANSDERMAL DAILY
Status: DISCONTINUED | OUTPATIENT
Start: 2017-05-18 | End: 2017-05-19 | Stop reason: HOSPADM

## 2017-05-17 RX ORDER — SODIUM CHLORIDE 9 MG/ML
125 INJECTION, SOLUTION INTRAVENOUS CONTINUOUS
Status: DISCONTINUED | OUTPATIENT
Start: 2017-05-17 | End: 2017-05-19

## 2017-05-17 RX ORDER — THIAMINE MONONITRATE (VIT B1) 100 MG
100 TABLET ORAL DAILY
Status: DISCONTINUED | OUTPATIENT
Start: 2017-05-17 | End: 2017-05-19 | Stop reason: HOSPADM

## 2017-05-17 RX ORDER — THIAMINE MONONITRATE (VIT B1) 100 MG
100 TABLET ORAL DAILY
Status: DISCONTINUED | OUTPATIENT
Start: 2017-05-18 | End: 2017-05-17 | Stop reason: SDUPTHER

## 2017-05-17 RX ORDER — LISINOPRIL 10 MG/1
10 TABLET ORAL DAILY
Status: DISCONTINUED | OUTPATIENT
Start: 2017-05-18 | End: 2017-05-19 | Stop reason: HOSPADM

## 2017-05-17 RX ADMIN — SODIUM CHLORIDE 125 ML/HR: 0.9 INJECTION, SOLUTION INTRAVENOUS at 14:36

## 2017-05-17 RX ADMIN — Medication 1 TABLET: at 16:12

## 2017-05-17 RX ADMIN — MORPHINE SULFATE 2 MG: 2 INJECTION, SOLUTION INTRAMUSCULAR; INTRAVENOUS at 21:59

## 2017-05-17 RX ADMIN — SODIUM CHLORIDE 125 ML/HR: 0.9 INJECTION, SOLUTION INTRAVENOUS at 22:00

## 2017-05-17 RX ADMIN — Medication 150 MG: at 17:30

## 2017-05-17 RX ADMIN — LORAZEPAM 1 MG: 2 INJECTION INTRAMUSCULAR; INTRAVENOUS at 22:22

## 2017-05-17 RX ADMIN — FOLIC ACID 1 MG: 1 TABLET ORAL at 16:12

## 2017-05-17 RX ADMIN — THIAMINE HCL TAB 100 MG 100 MG: 100 TAB at 16:12

## 2017-05-17 RX ADMIN — ENOXAPARIN SODIUM 40 MG: 40 INJECTION SUBCUTANEOUS at 17:30

## 2017-05-17 RX ADMIN — MORPHINE SULFATE 2 MG: 2 INJECTION, SOLUTION INTRAMUSCULAR; INTRAVENOUS at 16:13

## 2017-05-18 ENCOUNTER — APPOINTMENT (INPATIENT)
Dept: OCCUPATIONAL THERAPY | Facility: HOSPITAL | Age: 52
End: 2017-05-18
Payer: COMMERCIAL

## 2017-05-18 ENCOUNTER — APPOINTMENT (INPATIENT)
Dept: MRI IMAGING | Facility: HOSPITAL | Age: 52
End: 2017-05-18
Payer: COMMERCIAL

## 2017-05-18 ENCOUNTER — APPOINTMENT (INPATIENT)
Dept: NON INVASIVE DIAGNOSTICS | Facility: HOSPITAL | Age: 52
End: 2017-05-18
Payer: COMMERCIAL

## 2017-05-18 ENCOUNTER — APPOINTMENT (INPATIENT)
Dept: PHYSICAL THERAPY | Facility: HOSPITAL | Age: 52
End: 2017-05-18
Payer: COMMERCIAL

## 2017-05-18 ENCOUNTER — APPOINTMENT (INPATIENT)
Dept: ULTRASOUND IMAGING | Facility: HOSPITAL | Age: 52
End: 2017-05-18
Payer: COMMERCIAL

## 2017-05-18 PROBLEM — R47.1 DYSARTHRIA: Status: ACTIVE | Noted: 2017-05-18

## 2017-05-18 PROBLEM — E44.0 MALNUTRITION OF MODERATE DEGREE (HCC): Status: ACTIVE | Noted: 2017-05-18

## 2017-05-18 PROBLEM — E86.0 DEHYDRATION: Status: ACTIVE | Noted: 2017-05-18

## 2017-05-18 PROBLEM — R47.01 EXPRESSIVE APHASIA: Status: ACTIVE | Noted: 2017-05-18

## 2017-05-18 LAB
25(OH)D3 SERPL-MCNC: 41.7 NG/ML (ref 30–100)
ALBUMIN SERPL BCP-MCNC: 3.3 G/DL (ref 3.5–5)
ALP SERPL-CCNC: 274 U/L (ref 46–116)
ALT SERPL W P-5'-P-CCNC: 32 U/L (ref 12–78)
ANION GAP SERPL CALCULATED.3IONS-SCNC: 9 MMOL/L (ref 4–13)
AST SERPL W P-5'-P-CCNC: 82 U/L (ref 5–45)
ATRIAL RATE: 75 BPM
BASOPHILS # BLD AUTO: 0.07 THOUSANDS/ΜL (ref 0–0.1)
BASOPHILS NFR BLD AUTO: 1 % (ref 0–1)
BILIRUB SERPL-MCNC: 0.5 MG/DL (ref 0.2–1)
BILIRUB UR QL STRIP: NEGATIVE
BUN SERPL-MCNC: 3 MG/DL (ref 5–25)
CA-I BLD-SCNC: 1.09 MMOL/L (ref 1.12–1.32)
CALCIUM SERPL-MCNC: 8.8 MG/DL (ref 8.3–10.1)
CHLORIDE SERPL-SCNC: 91 MMOL/L (ref 100–108)
CLARITY UR: CLEAR
CO2 SERPL-SCNC: 30 MMOL/L (ref 21–32)
COLOR UR: YELLOW
CREAT SERPL-MCNC: 0.52 MG/DL (ref 0.6–1.3)
EOSINOPHIL # BLD AUTO: 0.07 THOUSAND/ΜL (ref 0–0.61)
EOSINOPHIL NFR BLD AUTO: 1 % (ref 0–6)
ERYTHROCYTE [DISTWIDTH] IN BLOOD BY AUTOMATED COUNT: 21.1 % (ref 11.6–15.1)
ETHANOL SERPL-MCNC: <3 MG/DL (ref 0–3)
FOLATE SERPL-MCNC: 15.9 NG/ML (ref 3.1–17.5)
GFR SERPL CREATININE-BSD FRML MDRD: >60 ML/MIN/1.73SQ M
GLUCOSE SERPL-MCNC: 124 MG/DL (ref 65–140)
GLUCOSE UR STRIP-MCNC: ABNORMAL MG/DL
HAV IGM SER QL: NORMAL
HBV CORE IGM SER QL: NORMAL
HBV SURFACE AG SER QL: NORMAL
HCT VFR BLD AUTO: 29.5 % (ref 36.5–49.3)
HCV AB SER QL: NORMAL
HGB BLD-MCNC: 8.7 G/DL (ref 12–17)
HGB UR QL STRIP.AUTO: NEGATIVE
KETONES UR STRIP-MCNC: NEGATIVE MG/DL
LACTATE SERPL-SCNC: 1.9 MMOL/L (ref 0.5–2)
LEUKOCYTE ESTERASE UR QL STRIP: NEGATIVE
LIPASE SERPL-CCNC: 120 U/L (ref 73–393)
LYMPHOCYTES # BLD AUTO: 1.5 THOUSANDS/ΜL (ref 0.6–4.47)
LYMPHOCYTES NFR BLD AUTO: 24 % (ref 14–44)
MAGNESIUM SERPL-MCNC: 1.1 MG/DL (ref 1.6–2.6)
MCH RBC QN AUTO: 21.2 PG (ref 26.8–34.3)
MCHC RBC AUTO-ENTMCNC: 29.5 G/DL (ref 31.4–37.4)
MCV RBC AUTO: 72 FL (ref 82–98)
MONOCYTES # BLD AUTO: 1.25 THOUSAND/ΜL (ref 0.17–1.22)
MONOCYTES NFR BLD AUTO: 20 % (ref 4–12)
MRSA NOSE QL CULT: NORMAL
NEUTROPHILS # BLD AUTO: 3.36 THOUSANDS/ΜL (ref 1.85–7.62)
NEUTS SEG NFR BLD AUTO: 54 % (ref 43–75)
NITRITE UR QL STRIP: NEGATIVE
P AXIS: 49 DEGREES
PH UR STRIP.AUTO: 7 [PH] (ref 4.5–8)
PHOSPHATE SERPL-MCNC: 3 MG/DL (ref 2.7–4.5)
PLATELET # BLD AUTO: 290 THOUSANDS/UL (ref 149–390)
PMV BLD AUTO: 9 FL (ref 8.9–12.7)
POTASSIUM SERPL-SCNC: 3.6 MMOL/L (ref 3.5–5.3)
PR INTERVAL: 146 MS
PROT SERPL-MCNC: 8.4 G/DL (ref 6.4–8.2)
PROT UR STRIP-MCNC: NEGATIVE MG/DL
QRS AXIS: -30 DEGREES
QRSD INTERVAL: 94 MS
QT INTERVAL: 418 MS
QTC INTERVAL: 466 MS
RBC # BLD AUTO: 4.11 MILLION/UL (ref 3.88–5.62)
SODIUM SERPL-SCNC: 130 MMOL/L (ref 136–145)
SP GR UR STRIP.AUTO: <=1.005 (ref 1–1.03)
T WAVE AXIS: 50 DEGREES
TSH SERPL DL<=0.05 MIU/L-ACNC: 3.37 UIU/ML (ref 0.36–3.74)
UROBILINOGEN UR QL STRIP.AUTO: 0.2 E.U./DL
VENTRICULAR RATE: 75 BPM
VIT B12 SERPL-MCNC: 361 PG/ML (ref 100–900)
WBC # BLD AUTO: 6.25 THOUSAND/UL (ref 4.31–10.16)

## 2017-05-18 PROCEDURE — G8987 SELF CARE CURRENT STATUS: HCPCS

## 2017-05-18 PROCEDURE — 84100 ASSAY OF PHOSPHORUS: CPT | Performed by: INTERNAL MEDICINE

## 2017-05-18 PROCEDURE — 82746 ASSAY OF FOLIC ACID SERUM: CPT | Performed by: INTERNAL MEDICINE

## 2017-05-18 PROCEDURE — 82330 ASSAY OF CALCIUM: CPT | Performed by: INTERNAL MEDICINE

## 2017-05-18 PROCEDURE — 93880 EXTRACRANIAL BILAT STUDY: CPT

## 2017-05-18 PROCEDURE — 70553 MRI BRAIN STEM W/O & W/DYE: CPT

## 2017-05-18 PROCEDURE — 81003 URINALYSIS AUTO W/O SCOPE: CPT | Performed by: INTERNAL MEDICINE

## 2017-05-18 PROCEDURE — 97116 GAIT TRAINING THERAPY: CPT | Performed by: PHYSICAL THERAPIST

## 2017-05-18 PROCEDURE — 93005 ELECTROCARDIOGRAM TRACING: CPT | Performed by: EMERGENCY MEDICINE

## 2017-05-18 PROCEDURE — 82306 VITAMIN D 25 HYDROXY: CPT | Performed by: INTERNAL MEDICINE

## 2017-05-18 PROCEDURE — 82607 VITAMIN B-12: CPT | Performed by: INTERNAL MEDICINE

## 2017-05-18 PROCEDURE — 83605 ASSAY OF LACTIC ACID: CPT | Performed by: INTERNAL MEDICINE

## 2017-05-18 PROCEDURE — 80053 COMPREHEN METABOLIC PANEL: CPT | Performed by: INTERNAL MEDICINE

## 2017-05-18 PROCEDURE — G8978 MOBILITY CURRENT STATUS: HCPCS | Performed by: PHYSICAL THERAPIST

## 2017-05-18 PROCEDURE — 83735 ASSAY OF MAGNESIUM: CPT | Performed by: INTERNAL MEDICINE

## 2017-05-18 PROCEDURE — A9585 GADOBUTROL INJECTION: HCPCS | Performed by: INTERNAL MEDICINE

## 2017-05-18 PROCEDURE — 84443 ASSAY THYROID STIM HORMONE: CPT | Performed by: INTERNAL MEDICINE

## 2017-05-18 PROCEDURE — 83690 ASSAY OF LIPASE: CPT | Performed by: INTERNAL MEDICINE

## 2017-05-18 PROCEDURE — 97530 THERAPEUTIC ACTIVITIES: CPT

## 2017-05-18 PROCEDURE — 80320 DRUG SCREEN QUANTALCOHOLS: CPT | Performed by: INTERNAL MEDICINE

## 2017-05-18 PROCEDURE — 83930 ASSAY OF BLOOD OSMOLALITY: CPT | Performed by: PHYSICIAN ASSISTANT

## 2017-05-18 PROCEDURE — 97163 PT EVAL HIGH COMPLEX 45 MIN: CPT | Performed by: PHYSICAL THERAPIST

## 2017-05-18 PROCEDURE — 80074 ACUTE HEPATITIS PANEL: CPT | Performed by: INTERNAL MEDICINE

## 2017-05-18 PROCEDURE — 85025 COMPLETE CBC W/AUTO DIFF WBC: CPT | Performed by: INTERNAL MEDICINE

## 2017-05-18 PROCEDURE — 70544 MR ANGIOGRAPHY HEAD W/O DYE: CPT

## 2017-05-18 PROCEDURE — 97167 OT EVAL HIGH COMPLEX 60 MIN: CPT

## 2017-05-18 PROCEDURE — G8979 MOBILITY GOAL STATUS: HCPCS | Performed by: PHYSICAL THERAPIST

## 2017-05-18 PROCEDURE — 93306 TTE W/DOPPLER COMPLETE: CPT

## 2017-05-18 PROCEDURE — G8988 SELF CARE GOAL STATUS: HCPCS

## 2017-05-18 RX ORDER — POTASSIUM CHLORIDE 20 MEQ/1
40 TABLET, EXTENDED RELEASE ORAL ONCE
Status: COMPLETED | OUTPATIENT
Start: 2017-05-18 | End: 2017-05-18

## 2017-05-18 RX ORDER — MAGNESIUM SULFATE HEPTAHYDRATE 40 MG/ML
2 INJECTION, SOLUTION INTRAVENOUS ONCE
Status: COMPLETED | OUTPATIENT
Start: 2017-05-18 | End: 2017-05-18

## 2017-05-18 RX ADMIN — SODIUM CHLORIDE 125 ML/HR: 0.9 INJECTION, SOLUTION INTRAVENOUS at 20:47

## 2017-05-18 RX ADMIN — FLUOXETINE 10 MG: 10 CAPSULE ORAL at 09:52

## 2017-05-18 RX ADMIN — OXYCODONE HYDROCHLORIDE 5 MG: 5 TABLET ORAL at 09:13

## 2017-05-18 RX ADMIN — LORATADINE 10 MG: 10 TABLET ORAL at 09:52

## 2017-05-18 RX ADMIN — Medication 150 MG: at 18:10

## 2017-05-18 RX ADMIN — LISINOPRIL 10 MG: 10 TABLET ORAL at 09:52

## 2017-05-18 RX ADMIN — THIAMINE HCL TAB 100 MG 100 MG: 100 TAB at 09:52

## 2017-05-18 RX ADMIN — MORPHINE SULFATE 2 MG: 2 INJECTION, SOLUTION INTRAMUSCULAR; INTRAVENOUS at 20:47

## 2017-05-18 RX ADMIN — GADOBUTROL 6 ML: 604.72 INJECTION INTRAVENOUS at 17:32

## 2017-05-18 RX ADMIN — MAGNESIUM SULFATE HEPTAHYDRATE 2 G: 40 INJECTION, SOLUTION INTRAVENOUS at 11:46

## 2017-05-18 RX ADMIN — MORPHINE SULFATE 2 MG: 2 INJECTION, SOLUTION INTRAMUSCULAR; INTRAVENOUS at 14:26

## 2017-05-18 RX ADMIN — OXYCODONE HYDROCHLORIDE 5 MG: 5 TABLET ORAL at 23:51

## 2017-05-18 RX ADMIN — OXYCODONE HYDROCHLORIDE 5 MG: 5 TABLET ORAL at 18:10

## 2017-05-18 RX ADMIN — ENOXAPARIN SODIUM 40 MG: 40 INJECTION SUBCUTANEOUS at 18:10

## 2017-05-18 RX ADMIN — MORPHINE SULFATE 2 MG: 2 INJECTION, SOLUTION INTRAMUSCULAR; INTRAVENOUS at 05:54

## 2017-05-18 RX ADMIN — FOLIC ACID 1 MG: 1 TABLET ORAL at 09:52

## 2017-05-18 RX ADMIN — Medication 150 MG: at 09:52

## 2017-05-18 RX ADMIN — CHOLECALCIFEROL TAB 25 MCG (1000 UNIT) 1000 UNITS: 25 TAB at 09:52

## 2017-05-18 RX ADMIN — FLUTICASONE PROPIONATE 1 SPRAY: 50 SPRAY, METERED NASAL at 09:53

## 2017-05-18 RX ADMIN — PANTOPRAZOLE SODIUM 40 MG: 40 TABLET, DELAYED RELEASE ORAL at 05:54

## 2017-05-18 RX ADMIN — OXYCODONE HYDROCHLORIDE 5 MG: 5 TABLET ORAL at 13:13

## 2017-05-18 RX ADMIN — Medication 1 TABLET: at 09:51

## 2017-05-18 RX ADMIN — Medication 400 MG: at 18:10

## 2017-05-18 RX ADMIN — Medication 400 MG: at 09:52

## 2017-05-18 RX ADMIN — CYANOCOBALAMIN TAB 500 MCG 250 MCG: 500 TAB at 09:51

## 2017-05-18 RX ADMIN — POTASSIUM CHLORIDE 40 MEQ: 1500 TABLET, EXTENDED RELEASE ORAL at 09:52

## 2017-05-19 ENCOUNTER — APPOINTMENT (INPATIENT)
Dept: PHYSICAL THERAPY | Facility: HOSPITAL | Age: 52
End: 2017-05-19
Payer: COMMERCIAL

## 2017-05-19 VITALS
DIASTOLIC BLOOD PRESSURE: 84 MMHG | HEIGHT: 68 IN | TEMPERATURE: 98.1 F | SYSTOLIC BLOOD PRESSURE: 135 MMHG | HEART RATE: 109 BPM | WEIGHT: 143.3 LBS | OXYGEN SATURATION: 97 % | BODY MASS INDEX: 21.72 KG/M2 | RESPIRATION RATE: 18 BRPM

## 2017-05-19 LAB
ALBUMIN SERPL BCP-MCNC: 3.3 G/DL (ref 3.5–5)
ALP SERPL-CCNC: 259 U/L (ref 46–116)
ALT SERPL W P-5'-P-CCNC: 31 U/L (ref 12–78)
ANION GAP SERPL CALCULATED.3IONS-SCNC: 8 MMOL/L (ref 4–13)
AST SERPL W P-5'-P-CCNC: 83 U/L (ref 5–45)
BASOPHILS # BLD AUTO: 0.03 THOUSANDS/ΜL (ref 0–0.1)
BASOPHILS NFR BLD AUTO: 0 % (ref 0–1)
BILIRUB SERPL-MCNC: 0.7 MG/DL (ref 0.2–1)
BUN SERPL-MCNC: 3 MG/DL (ref 5–25)
CALCIUM SERPL-MCNC: 8.9 MG/DL (ref 8.3–10.1)
CHLORIDE SERPL-SCNC: 91 MMOL/L (ref 100–108)
CO2 SERPL-SCNC: 29 MMOL/L (ref 21–32)
CREAT SERPL-MCNC: 0.48 MG/DL (ref 0.6–1.3)
EOSINOPHIL # BLD AUTO: 0.11 THOUSAND/ΜL (ref 0–0.61)
EOSINOPHIL NFR BLD AUTO: 2 % (ref 0–6)
ERYTHROCYTE [DISTWIDTH] IN BLOOD BY AUTOMATED COUNT: 20.8 % (ref 11.6–15.1)
GFR SERPL CREATININE-BSD FRML MDRD: >60 ML/MIN/1.73SQ M
GLUCOSE SERPL-MCNC: 107 MG/DL (ref 65–140)
HCT VFR BLD AUTO: 30.3 % (ref 36.5–49.3)
HGB BLD-MCNC: 9 G/DL (ref 12–17)
LACTATE SERPL-SCNC: 0.9 MMOL/L (ref 0.5–2)
LYMPHOCYTES # BLD AUTO: 1.06 THOUSANDS/ΜL (ref 0.6–4.47)
LYMPHOCYTES NFR BLD AUTO: 15 % (ref 14–44)
MAGNESIUM SERPL-MCNC: 1.5 MG/DL (ref 1.6–2.6)
MCH RBC QN AUTO: 21.4 PG (ref 26.8–34.3)
MCHC RBC AUTO-ENTMCNC: 29.7 G/DL (ref 31.4–37.4)
MCV RBC AUTO: 72 FL (ref 82–98)
MONOCYTES # BLD AUTO: 0.72 THOUSAND/ΜL (ref 0.17–1.22)
MONOCYTES NFR BLD AUTO: 10 % (ref 4–12)
NEUTROPHILS # BLD AUTO: 5.07 THOUSANDS/ΜL (ref 1.85–7.62)
NEUTS SEG NFR BLD AUTO: 73 % (ref 43–75)
OSMOLALITY UR/SERPL-RTO: 271 MMOL/KG (ref 282–298)
OSMOLALITY UR: 181 MMOL/KG
PHOSPHATE SERPL-MCNC: 3 MG/DL (ref 2.7–4.5)
PLATELET # BLD AUTO: 278 THOUSANDS/UL (ref 149–390)
PMV BLD AUTO: 9 FL (ref 8.9–12.7)
POTASSIUM SERPL-SCNC: 3.8 MMOL/L (ref 3.5–5.3)
PROT SERPL-MCNC: 8.3 G/DL (ref 6.4–8.2)
RBC # BLD AUTO: 4.21 MILLION/UL (ref 3.88–5.62)
SODIUM SERPL-SCNC: 128 MMOL/L (ref 136–145)
WBC # BLD AUTO: 6.99 THOUSAND/UL (ref 4.31–10.16)

## 2017-05-19 PROCEDURE — 85025 COMPLETE CBC W/AUTO DIFF WBC: CPT | Performed by: INTERNAL MEDICINE

## 2017-05-19 PROCEDURE — 84100 ASSAY OF PHOSPHORUS: CPT | Performed by: INTERNAL MEDICINE

## 2017-05-19 PROCEDURE — 83735 ASSAY OF MAGNESIUM: CPT | Performed by: INTERNAL MEDICINE

## 2017-05-19 PROCEDURE — 83605 ASSAY OF LACTIC ACID: CPT | Performed by: INTERNAL MEDICINE

## 2017-05-19 PROCEDURE — 83935 ASSAY OF URINE OSMOLALITY: CPT | Performed by: PHYSICIAN ASSISTANT

## 2017-05-19 PROCEDURE — 93005 ELECTROCARDIOGRAM TRACING: CPT | Performed by: INTERNAL MEDICINE

## 2017-05-19 PROCEDURE — 80053 COMPREHEN METABOLIC PANEL: CPT | Performed by: INTERNAL MEDICINE

## 2017-05-19 PROCEDURE — 97116 GAIT TRAINING THERAPY: CPT

## 2017-05-19 RX ORDER — LORAZEPAM 2 MG/ML
1 INJECTION INTRAMUSCULAR ONCE
Status: COMPLETED | OUTPATIENT
Start: 2017-05-19 | End: 2017-05-19

## 2017-05-19 RX ORDER — FLUOXETINE 10 MG/1
10 CAPSULE ORAL DAILY
Qty: 30 CAPSULE | Refills: 0 | Status: ON HOLD | OUTPATIENT
Start: 2017-05-19 | End: 2017-10-05 | Stop reason: ALTCHOICE

## 2017-05-19 RX ORDER — CHLORDIAZEPOXIDE HYDROCHLORIDE 5 MG/1
10 CAPSULE, GELATIN COATED ORAL EVERY 8 HOURS SCHEDULED
Status: DISCONTINUED | OUTPATIENT
Start: 2017-05-19 | End: 2017-05-19 | Stop reason: HOSPADM

## 2017-05-19 RX ADMIN — PANTOPRAZOLE SODIUM 40 MG: 40 TABLET, DELAYED RELEASE ORAL at 05:35

## 2017-05-19 RX ADMIN — CHOLECALCIFEROL TAB 25 MCG (1000 UNIT) 1000 UNITS: 25 TAB at 09:55

## 2017-05-19 RX ADMIN — CHLORDIAZEPOXIDE HYDROCHLORIDE 10 MG: 5 CAPSULE ORAL at 09:55

## 2017-05-19 RX ADMIN — LORAZEPAM 1 MG: 2 INJECTION INTRAMUSCULAR; INTRAVENOUS at 08:18

## 2017-05-19 RX ADMIN — CYANOCOBALAMIN TAB 500 MCG 250 MCG: 500 TAB at 09:56

## 2017-05-19 RX ADMIN — LORATADINE 10 MG: 10 TABLET ORAL at 09:56

## 2017-05-19 RX ADMIN — Medication 150 MG: at 09:55

## 2017-05-19 RX ADMIN — FLUTICASONE PROPIONATE 1 SPRAY: 50 SPRAY, METERED NASAL at 09:59

## 2017-05-19 RX ADMIN — THIAMINE HCL TAB 100 MG 100 MG: 100 TAB at 09:57

## 2017-05-19 RX ADMIN — LISINOPRIL 10 MG: 10 TABLET ORAL at 09:55

## 2017-05-19 RX ADMIN — OXYCODONE HYDROCHLORIDE 5 MG: 5 TABLET ORAL at 09:56

## 2017-05-19 RX ADMIN — SODIUM CHLORIDE 125 ML/HR: 0.9 INJECTION, SOLUTION INTRAVENOUS at 04:31

## 2017-05-19 RX ADMIN — MORPHINE SULFATE 2 MG: 2 INJECTION, SOLUTION INTRAMUSCULAR; INTRAVENOUS at 01:06

## 2017-05-19 RX ADMIN — Medication 1 TABLET: at 09:56

## 2017-05-19 RX ADMIN — FOLIC ACID 1 MG: 1 TABLET ORAL at 09:56

## 2017-05-19 RX ADMIN — FLUOXETINE 10 MG: 10 CAPSULE ORAL at 09:55

## 2017-05-22 LAB
ATRIAL RATE: 88 BPM
P AXIS: 70 DEGREES
PR INTERVAL: 150 MS
QRS AXIS: -41 DEGREES
QRSD INTERVAL: 92 MS
QT INTERVAL: 368 MS
QTC INTERVAL: 445 MS
T WAVE AXIS: 68 DEGREES
VENTRICULAR RATE: 88 BPM

## 2017-06-20 ENCOUNTER — ALLSCRIPTS OFFICE VISIT (OUTPATIENT)
Dept: FAMILY MEDICINE CLINIC | Facility: CLINIC | Age: 52
End: 2017-06-20
Payer: COMMERCIAL

## 2017-06-20 DIAGNOSIS — D64.9 ANEMIA: ICD-10-CM

## 2017-06-20 DIAGNOSIS — R18.8 OTHER ASCITES: ICD-10-CM

## 2017-06-20 DIAGNOSIS — Z12.5 ENCOUNTER FOR SCREENING FOR MALIGNANT NEOPLASM OF PROSTATE: ICD-10-CM

## 2017-06-20 PROCEDURE — T1015 CLINIC SERVICE: HCPCS | Performed by: FAMILY MEDICINE

## 2017-06-24 ENCOUNTER — HOSPITAL ENCOUNTER (OUTPATIENT)
Dept: ULTRASOUND IMAGING | Facility: HOSPITAL | Age: 52
Discharge: HOME | End: 2017-06-24
Payer: COMMERCIAL

## 2017-06-24 DIAGNOSIS — R18.8 OTHER ASCITES: ICD-10-CM

## 2017-06-24 PROCEDURE — 76705 ECHO EXAM OF ABDOMEN: CPT

## 2017-07-28 ENCOUNTER — HOSPITAL ENCOUNTER (EMERGENCY)
Facility: HOSPITAL | Age: 52
End: 2017-07-28
Attending: EMERGENCY MEDICINE
Payer: COMMERCIAL

## 2017-07-28 ENCOUNTER — APPOINTMENT (EMERGENCY)
Dept: CT IMAGING | Facility: HOSPITAL | Age: 52
End: 2017-07-28
Payer: COMMERCIAL

## 2017-07-28 ENCOUNTER — APPOINTMENT (EMERGENCY)
Dept: RADIOLOGY | Facility: HOSPITAL | Age: 52
End: 2017-07-28
Payer: COMMERCIAL

## 2017-07-28 ENCOUNTER — HOSPITAL ENCOUNTER (INPATIENT)
Facility: HOSPITAL | Age: 52
LOS: 7 days | Discharge: HOME HEALTH | DRG: 347 | End: 2017-08-05
Attending: ANESTHESIOLOGY | Admitting: HOSPITALIST
Payer: COMMERCIAL

## 2017-07-28 DIAGNOSIS — S22.000A THORACIC COMPRESSION FRACTURE (HCC): ICD-10-CM

## 2017-07-28 DIAGNOSIS — E87.1 ACUTE HYPONATREMIA: ICD-10-CM

## 2017-07-28 DIAGNOSIS — G93.40 ENCEPHALOPATHY: ICD-10-CM

## 2017-07-28 DIAGNOSIS — R29.898 WEAKNESS OF LEFT UPPER EXTREMITY: Primary | ICD-10-CM

## 2017-07-28 DIAGNOSIS — R56.9 SEIZURE (HCC): ICD-10-CM

## 2017-07-28 DIAGNOSIS — R55 SYNCOPE, UNSPECIFIED SYNCOPE TYPE: ICD-10-CM

## 2017-07-28 DIAGNOSIS — E87.1 HYPONATREMIA: ICD-10-CM

## 2017-07-28 DIAGNOSIS — S22.009A CLOSED FRACTURE OF MULTIPLE THORACIC VERTEBRAE (HCC): ICD-10-CM

## 2017-07-28 DIAGNOSIS — R55 SYNCOPE AND COLLAPSE: ICD-10-CM

## 2017-07-28 DIAGNOSIS — F10.10 ALCOHOL ABUSE: ICD-10-CM

## 2017-07-28 DIAGNOSIS — S16.1XXA CERVICAL STRAIN, INITIAL ENCOUNTER: ICD-10-CM

## 2017-07-28 DIAGNOSIS — S22.009A CLOSED FRACTURE OF MULTIPLE THORACIC VERTEBRAE, INITIAL ENCOUNTER (HCC): ICD-10-CM

## 2017-07-28 DIAGNOSIS — R41.82 ALTERED MENTAL STATUS: Primary | ICD-10-CM

## 2017-07-28 DIAGNOSIS — S11.90XA OPEN NECK WOUND: ICD-10-CM

## 2017-07-28 DIAGNOSIS — D64.9 CHRONIC ANEMIA: ICD-10-CM

## 2017-07-28 DIAGNOSIS — R55 SYNCOPE: ICD-10-CM

## 2017-07-28 LAB
ABO GROUP BLD: NORMAL
ALBUMIN SERPL BCP-MCNC: 3.6 G/DL (ref 3.5–5)
ALP SERPL-CCNC: 259 U/L (ref 46–116)
ALT SERPL W P-5'-P-CCNC: 44 U/L (ref 12–78)
AMPHETAMINES SERPL QL SCN: NEGATIVE
ANION GAP BLD CALC-SCNC: 25 MMOL/L (ref 4–13)
ANION GAP SERPL CALCULATED.3IONS-SCNC: 25 MMOL/L (ref 4–13)
ANISOCYTOSIS BLD QL SMEAR: PRESENT
APAP SERPL-MCNC: <2 UG/ML (ref 10–30)
APTT PPP: 37 SECONDS (ref 23–35)
AST SERPL W P-5'-P-CCNC: 128 U/L (ref 5–45)
BARBITURATES UR QL: NEGATIVE
BASO STIPL BLD QL SMEAR: PRESENT
BASOPHILS # BLD MANUAL: 0 THOUSAND/UL (ref 0–0.1)
BASOPHILS NFR MAR MANUAL: 0 % (ref 0–1)
BENZODIAZ UR QL: NEGATIVE
BILIRUB SERPL-MCNC: 1 MG/DL (ref 0.2–1)
BLD GP AB SCN SERPL QL: NEGATIVE
BUN BLD-MCNC: 4 MG/DL (ref 5–25)
BUN SERPL-MCNC: 6 MG/DL (ref 5–25)
CA-I BLD-SCNC: 1.01 MMOL/L (ref 1.12–1.32)
CALCIUM SERPL-MCNC: 8.4 MG/DL (ref 8.3–10.1)
CHLORIDE BLD-SCNC: 73 MMOL/L (ref 100–108)
CHLORIDE SERPL-SCNC: 72 MMOL/L (ref 100–108)
CO2 SERPL-SCNC: 14 MMOL/L (ref 21–32)
COCAINE UR QL: NEGATIVE
CREAT BLD-MCNC: 0.8 MG/DL (ref 0.6–1.3)
CREAT SERPL-MCNC: 0.98 MG/DL (ref 0.6–1.3)
EOSINOPHIL # BLD MANUAL: 0 THOUSAND/UL (ref 0–0.4)
EOSINOPHIL NFR BLD MANUAL: 0 % (ref 0–6)
ERYTHROCYTE [DISTWIDTH] IN BLOOD BY AUTOMATED COUNT: 21.1 % (ref 11.6–15.1)
ETHANOL SERPL-MCNC: 30 MG/DL (ref 0–3)
GFR SERPL CREATININE-BSD FRML MDRD: 103 ML/MIN/1.73SQ M
GFR SERPL CREATININE-BSD FRML MDRD: 89 ML/MIN/1.73SQ M
GLUCOSE SERPL-MCNC: 147 MG/DL (ref 65–140)
GLUCOSE SERPL-MCNC: 149 MG/DL (ref 65–140)
HCT VFR BLD AUTO: 30.8 % (ref 36.5–49.3)
HCT VFR BLD CALC: 41 % (ref 36.5–49.3)
HGB BLD-MCNC: 9.3 G/DL (ref 12–17)
HGB BLDA-MCNC: 13.9 G/DL (ref 12–17)
INR PPP: 1.21 (ref 0.86–1.16)
LG PLATELETS BLD QL SMEAR: PRESENT
LYMPHOCYTES # BLD AUTO: 2.53 THOUSAND/UL (ref 0.6–4.47)
LYMPHOCYTES # BLD AUTO: 25 % (ref 14–44)
MCH RBC QN AUTO: 20.5 PG (ref 26.8–34.3)
MCHC RBC AUTO-ENTMCNC: 30.2 G/DL (ref 31.4–37.4)
MCV RBC AUTO: 68 FL (ref 82–98)
METHADONE UR QL: NEGATIVE
MONOCYTES # BLD AUTO: 1.01 THOUSAND/UL (ref 0–1.22)
MONOCYTES NFR BLD: 10 % (ref 4–12)
NEUTROPHILS # BLD MANUAL: 6.57 THOUSAND/UL (ref 1.85–7.62)
NEUTS SEG NFR BLD AUTO: 65 % (ref 43–75)
OPIATES UR QL SCN: NEGATIVE
PCO2 BLD: 17 MMOL/L (ref 21–32)
PCP UR QL: NEGATIVE
PLATELET # BLD AUTO: 198 THOUSANDS/UL (ref 149–390)
PLATELET BLD QL SMEAR: ADEQUATE
PMV BLD AUTO: 10.4 FL (ref 8.9–12.7)
POLYCHROMASIA BLD QL SMEAR: PRESENT
POTASSIUM BLD-SCNC: 3.2 MMOL/L (ref 3.5–5.3)
POTASSIUM SERPL-SCNC: 3.2 MMOL/L (ref 3.5–5.3)
PROT SERPL-MCNC: 8.6 G/DL (ref 6.4–8.2)
PROTHROMBIN TIME: 15.2 SECONDS (ref 12.1–14.4)
RBC # BLD AUTO: 4.54 MILLION/UL (ref 3.88–5.62)
RH BLD: POSITIVE
SALICYLATES SERPL-MCNC: 3.1 MG/DL (ref 3–20)
SODIUM BLD-SCNC: 111 MMOL/L (ref 136–145)
SODIUM SERPL-SCNC: 111 MMOL/L (ref 136–145)
SPECIMEN EXPIRATION DATE: NORMAL
SPECIMEN SOURCE: ABNORMAL
THC UR QL: NEGATIVE
TOTAL CELLS COUNTED SPEC: 100
TROPONIN I SERPL-MCNC: 0.05 NG/ML
WBC # BLD AUTO: 10.1 THOUSAND/UL (ref 4.31–10.16)

## 2017-07-28 PROCEDURE — 80307 DRUG TEST PRSMV CHEM ANLYZR: CPT | Performed by: EMERGENCY MEDICINE

## 2017-07-28 PROCEDURE — 80329 ANALGESICS NON-OPIOID 1 OR 2: CPT | Performed by: EMERGENCY MEDICINE

## 2017-07-28 PROCEDURE — 72125 CT NECK SPINE W/O DYE: CPT

## 2017-07-28 PROCEDURE — 93005 ELECTROCARDIOGRAM TRACING: CPT | Performed by: EMERGENCY MEDICINE

## 2017-07-28 PROCEDURE — 96361 HYDRATE IV INFUSION ADD-ON: CPT

## 2017-07-28 PROCEDURE — 73060 X-RAY EXAM OF HUMERUS: CPT

## 2017-07-28 PROCEDURE — 80047 BASIC METABLC PNL IONIZED CA: CPT

## 2017-07-28 PROCEDURE — 96374 THER/PROPH/DIAG INJ IV PUSH: CPT

## 2017-07-28 PROCEDURE — 80320 DRUG SCREEN QUANTALCOHOLS: CPT | Performed by: EMERGENCY MEDICINE

## 2017-07-28 PROCEDURE — 85007 BL SMEAR W/DIFF WBC COUNT: CPT | Performed by: EMERGENCY MEDICINE

## 2017-07-28 PROCEDURE — 74177 CT ABD & PELVIS W/CONTRAST: CPT

## 2017-07-28 PROCEDURE — 90715 TDAP VACCINE 7 YRS/> IM: CPT | Performed by: EMERGENCY MEDICINE

## 2017-07-28 PROCEDURE — 90471 IMMUNIZATION ADMIN: CPT

## 2017-07-28 PROCEDURE — 85027 COMPLETE CBC AUTOMATED: CPT | Performed by: EMERGENCY MEDICINE

## 2017-07-28 PROCEDURE — 70450 CT HEAD/BRAIN W/O DYE: CPT

## 2017-07-28 PROCEDURE — 86901 BLOOD TYPING SEROLOGIC RH(D): CPT | Performed by: EMERGENCY MEDICINE

## 2017-07-28 PROCEDURE — 85730 THROMBOPLASTIN TIME PARTIAL: CPT | Performed by: EMERGENCY MEDICINE

## 2017-07-28 PROCEDURE — 83930 ASSAY OF BLOOD OSMOLALITY: CPT | Performed by: EMERGENCY MEDICINE

## 2017-07-28 PROCEDURE — 96375 TX/PRO/DX INJ NEW DRUG ADDON: CPT

## 2017-07-28 PROCEDURE — 85014 HEMATOCRIT: CPT

## 2017-07-28 PROCEDURE — 82693 ASSAY OF ETHYLENE GLYCOL: CPT | Performed by: EMERGENCY MEDICINE

## 2017-07-28 PROCEDURE — 99285 EMERGENCY DEPT VISIT HI MDM: CPT

## 2017-07-28 PROCEDURE — 71260 CT THORAX DX C+: CPT

## 2017-07-28 PROCEDURE — 80053 COMPREHEN METABOLIC PANEL: CPT | Performed by: EMERGENCY MEDICINE

## 2017-07-28 PROCEDURE — 85610 PROTHROMBIN TIME: CPT | Performed by: EMERGENCY MEDICINE

## 2017-07-28 PROCEDURE — 86850 RBC ANTIBODY SCREEN: CPT | Performed by: EMERGENCY MEDICINE

## 2017-07-28 PROCEDURE — 36415 COLL VENOUS BLD VENIPUNCTURE: CPT | Performed by: EMERGENCY MEDICINE

## 2017-07-28 PROCEDURE — 84484 ASSAY OF TROPONIN QUANT: CPT | Performed by: EMERGENCY MEDICINE

## 2017-07-28 PROCEDURE — 86900 BLOOD TYPING SEROLOGIC ABO: CPT | Performed by: EMERGENCY MEDICINE

## 2017-07-28 RX ORDER — NALOXONE HYDROCHLORIDE 0.4 MG/ML
0.4 INJECTION, SOLUTION INTRAMUSCULAR; INTRAVENOUS; SUBCUTANEOUS ONCE
Status: COMPLETED | OUTPATIENT
Start: 2017-07-28 | End: 2017-07-28

## 2017-07-28 RX ORDER — DULOXETIN HYDROCHLORIDE 30 MG/1
20 CAPSULE, DELAYED RELEASE ORAL DAILY
COMMUNITY
End: 2017-08-05 | Stop reason: HOSPADM

## 2017-07-28 RX ORDER — KETOROLAC TROMETHAMINE 30 MG/ML
15 INJECTION, SOLUTION INTRAMUSCULAR; INTRAVENOUS ONCE
Status: COMPLETED | OUTPATIENT
Start: 2017-07-28 | End: 2017-07-28

## 2017-07-28 RX ORDER — ONDANSETRON 2 MG/ML
4 INJECTION INTRAMUSCULAR; INTRAVENOUS ONCE
Status: COMPLETED | OUTPATIENT
Start: 2017-07-28 | End: 2017-07-28

## 2017-07-28 RX ORDER — ASPIRIN 300 MG/1
300 SUPPOSITORY RECTAL ONCE
Status: COMPLETED | OUTPATIENT
Start: 2017-07-28 | End: 2017-07-28

## 2017-07-28 RX ADMIN — TETANUS TOXOID, REDUCED DIPHTHERIA TOXOID AND ACELLULAR PERTUSSIS VACCINE, ADSORBED 0.5 ML: 5; 2.5; 8; 8; 2.5 SUSPENSION INTRAMUSCULAR at 22:24

## 2017-07-28 RX ADMIN — SODIUM CHLORIDE 1000 ML: 0.9 INJECTION, SOLUTION INTRAVENOUS at 20:02

## 2017-07-28 RX ADMIN — NALOXONE HYDROCHLORIDE 0.4 MG: 0.4 INJECTION, SOLUTION INTRAMUSCULAR; INTRAVENOUS; SUBCUTANEOUS at 21:00

## 2017-07-28 RX ADMIN — KETOROLAC TROMETHAMINE 15 MG: 30 INJECTION, SOLUTION INTRAMUSCULAR; INTRAVENOUS at 22:23

## 2017-07-28 RX ADMIN — ASPIRIN 300 MG: 300 SUPPOSITORY RECTAL at 21:15

## 2017-07-28 RX ADMIN — ONDANSETRON 4 MG: 2 INJECTION INTRAMUSCULAR; INTRAVENOUS at 20:04

## 2017-07-28 RX ADMIN — IOHEXOL 100 ML: 350 INJECTION, SOLUTION INTRAVENOUS at 20:28

## 2017-07-29 ENCOUNTER — APPOINTMENT (INPATIENT)
Dept: RADIOLOGY | Facility: HOSPITAL | Age: 52
DRG: 347 | End: 2017-07-29
Payer: COMMERCIAL

## 2017-07-29 ENCOUNTER — APPOINTMENT (INPATIENT)
Dept: NON INVASIVE DIAGNOSTICS | Facility: HOSPITAL | Age: 52
DRG: 347 | End: 2017-07-29
Payer: COMMERCIAL

## 2017-07-29 ENCOUNTER — APPOINTMENT (INPATIENT)
Dept: NEUROLOGY | Facility: AMBULATORY SURGERY CENTER | Age: 52
DRG: 347 | End: 2017-07-29
Payer: COMMERCIAL

## 2017-07-29 ENCOUNTER — GENERIC CONVERSION - ENCOUNTER (OUTPATIENT)
Dept: OTHER | Facility: OTHER | Age: 52
End: 2017-07-29

## 2017-07-29 VITALS
DIASTOLIC BLOOD PRESSURE: 72 MMHG | BODY MASS INDEX: 26.95 KG/M2 | OXYGEN SATURATION: 95 % | SYSTOLIC BLOOD PRESSURE: 153 MMHG | RESPIRATION RATE: 16 BRPM | HEART RATE: 123 BPM | HEIGHT: 64 IN | TEMPERATURE: 98 F | WEIGHT: 157.85 LBS

## 2017-07-29 PROBLEM — G93.40 ENCEPHALOPATHY: Status: ACTIVE | Noted: 2017-07-29

## 2017-07-29 PROBLEM — S22.009A CLOSED FRACTURE OF MULTIPLE THORACIC VERTEBRAE (HCC): Status: ACTIVE | Noted: 2017-07-29

## 2017-07-29 PROBLEM — M54.2 NECK PAIN: Status: ACTIVE | Noted: 2017-07-29

## 2017-07-29 PROBLEM — S16.1XXA CERVICAL STRAIN: Status: ACTIVE | Noted: 2017-07-29

## 2017-07-29 PROBLEM — E87.6 HYPOKALEMIA: Status: ACTIVE | Noted: 2017-07-29

## 2017-07-29 PROBLEM — E87.1 HYPONATREMIA: Status: ACTIVE | Noted: 2017-07-29

## 2017-07-29 PROBLEM — R29.898 WEAKNESS OF LEFT UPPER EXTREMITY: Status: ACTIVE | Noted: 2017-07-29

## 2017-07-29 PROBLEM — R55 SYNCOPE: Status: ACTIVE | Noted: 2017-07-29

## 2017-07-29 LAB
AMMONIA PLAS-SCNC: 39 UMOL/L (ref 11–35)
ANION GAP SERPL CALCULATED.3IONS-SCNC: 12 MMOL/L (ref 4–13)
ANION GAP SERPL CALCULATED.3IONS-SCNC: 12 MMOL/L (ref 4–13)
ANION GAP SERPL CALCULATED.3IONS-SCNC: 14 MMOL/L (ref 4–13)
ANION GAP SERPL CALCULATED.3IONS-SCNC: 15 MMOL/L (ref 4–13)
ATRIAL RATE: 98 BPM
BASOPHILS # BLD AUTO: 0 THOUSANDS/ΜL (ref 0–0.1)
BASOPHILS # BLD AUTO: 0 THOUSANDS/ΜL (ref 0–0.1)
BASOPHILS NFR BLD AUTO: 0 % (ref 0–1)
BASOPHILS NFR BLD AUTO: 0 % (ref 0–1)
BUN SERPL-MCNC: 5 MG/DL (ref 5–25)
BUN SERPL-MCNC: 6 MG/DL (ref 5–25)
BUN SERPL-MCNC: 7 MG/DL (ref 5–25)
BUN SERPL-MCNC: 8 MG/DL (ref 5–25)
CALCIUM SERPL-MCNC: 8 MG/DL (ref 8.3–10.1)
CALCIUM SERPL-MCNC: 8.1 MG/DL (ref 8.3–10.1)
CALCIUM SERPL-MCNC: 8.2 MG/DL (ref 8.3–10.1)
CALCIUM SERPL-MCNC: 8.2 MG/DL (ref 8.3–10.1)
CALCIUM SERPL-MCNC: 8.3 MG/DL (ref 8.3–10.1)
CALCIUM SERPL-MCNC: 8.4 MG/DL (ref 8.3–10.1)
CHLORIDE SERPL-SCNC: 78 MMOL/L (ref 100–108)
CHLORIDE SERPL-SCNC: 79 MMOL/L (ref 100–108)
CHLORIDE SERPL-SCNC: 79 MMOL/L (ref 100–108)
CHLORIDE SERPL-SCNC: 80 MMOL/L (ref 100–108)
CHLORIDE SERPL-SCNC: 82 MMOL/L (ref 100–108)
CHLORIDE SERPL-SCNC: 84 MMOL/L (ref 100–108)
CK MB SERPL-MCNC: 1.4 % (ref 0–2.5)
CK MB SERPL-MCNC: 10.5 NG/ML (ref 0–5)
CK SERPL-CCNC: 765 U/L (ref 39–308)
CO2 SERPL-SCNC: 21 MMOL/L (ref 21–32)
CO2 SERPL-SCNC: 22 MMOL/L (ref 21–32)
CO2 SERPL-SCNC: 22 MMOL/L (ref 21–32)
CO2 SERPL-SCNC: 23 MMOL/L (ref 21–32)
CO2 SERPL-SCNC: 24 MMOL/L (ref 21–32)
CO2 SERPL-SCNC: 24 MMOL/L (ref 21–32)
CREAT SERPL-MCNC: 0.42 MG/DL (ref 0.6–1.3)
CREAT SERPL-MCNC: 0.46 MG/DL (ref 0.6–1.3)
CREAT SERPL-MCNC: 0.51 MG/DL (ref 0.6–1.3)
CREAT SERPL-MCNC: 0.55 MG/DL (ref 0.6–1.3)
CREAT SERPL-MCNC: 0.6 MG/DL (ref 0.6–1.3)
CREAT SERPL-MCNC: 0.63 MG/DL (ref 0.6–1.3)
EOSINOPHIL # BLD AUTO: 0 THOUSAND/ΜL (ref 0–0.61)
EOSINOPHIL # BLD AUTO: 0 THOUSAND/ΜL (ref 0–0.61)
EOSINOPHIL NFR BLD AUTO: 0 % (ref 0–6)
EOSINOPHIL NFR BLD AUTO: 0 % (ref 0–6)
ERYTHROCYTE [DISTWIDTH] IN BLOOD BY AUTOMATED COUNT: 20.5 % (ref 11.6–15.1)
ERYTHROCYTE [DISTWIDTH] IN BLOOD BY AUTOMATED COUNT: 20.8 % (ref 11.6–15.1)
GFR SERPL CREATININE-BSD FRML MDRD: 114 ML/MIN/1.73SQ M
GFR SERPL CREATININE-BSD FRML MDRD: 116 ML/MIN/1.73SQ M
GFR SERPL CREATININE-BSD FRML MDRD: 121 ML/MIN/1.73SQ M
GFR SERPL CREATININE-BSD FRML MDRD: 124 ML/MIN/1.73SQ M
GFR SERPL CREATININE-BSD FRML MDRD: 130 ML/MIN/1.73SQ M
GFR SERPL CREATININE-BSD FRML MDRD: 135 ML/MIN/1.73SQ M
GLUCOSE SERPL-MCNC: 100 MG/DL (ref 65–140)
GLUCOSE SERPL-MCNC: 107 MG/DL (ref 65–140)
GLUCOSE SERPL-MCNC: 126 MG/DL (ref 65–140)
GLUCOSE SERPL-MCNC: 80 MG/DL (ref 65–140)
GLUCOSE SERPL-MCNC: 83 MG/DL (ref 65–140)
GLUCOSE SERPL-MCNC: 86 MG/DL (ref 65–140)
GLUCOSE SERPL-MCNC: 90 MG/DL (ref 65–140)
GLUCOSE SERPL-MCNC: 93 MG/DL (ref 65–140)
HCT VFR BLD AUTO: 26.1 % (ref 36.5–49.3)
HCT VFR BLD AUTO: 26.4 % (ref 36.5–49.3)
HGB BLD-MCNC: 8 G/DL (ref 12–17)
HGB BLD-MCNC: 8 G/DL (ref 12–17)
LACTATE SERPL-SCNC: 1.6 MMOL/L (ref 0.5–2)
LYMPHOCYTES # BLD AUTO: 0.51 THOUSANDS/ΜL (ref 0.6–4.47)
LYMPHOCYTES # BLD AUTO: 0.83 THOUSANDS/ΜL (ref 0.6–4.47)
LYMPHOCYTES NFR BLD AUTO: 4 % (ref 14–44)
LYMPHOCYTES NFR BLD AUTO: 7 % (ref 14–44)
MAGNESIUM SERPL-MCNC: 1.7 MG/DL (ref 1.6–2.6)
MCH RBC QN AUTO: 20.5 PG (ref 26.8–34.3)
MCH RBC QN AUTO: 20.7 PG (ref 26.8–34.3)
MCHC RBC AUTO-ENTMCNC: 30.3 G/DL (ref 31.4–37.4)
MCHC RBC AUTO-ENTMCNC: 30.7 G/DL (ref 31.4–37.4)
MCV RBC AUTO: 67 FL (ref 82–98)
MCV RBC AUTO: 68 FL (ref 82–98)
MONOCYTES # BLD AUTO: 1.09 THOUSAND/ΜL (ref 0.17–1.22)
MONOCYTES # BLD AUTO: 1.69 THOUSAND/ΜL (ref 0.17–1.22)
MONOCYTES NFR BLD AUTO: 10 % (ref 4–12)
MONOCYTES NFR BLD AUTO: 15 % (ref 4–12)
NEUTROPHILS # BLD AUTO: 9.2 THOUSANDS/ΜL (ref 1.85–7.62)
NEUTROPHILS # BLD AUTO: 9.25 THOUSANDS/ΜL (ref 1.85–7.62)
NEUTS SEG NFR BLD AUTO: 81 % (ref 43–75)
NEUTS SEG NFR BLD AUTO: 83 % (ref 43–75)
NRBC BLD AUTO-RTO: 0 /100 WBCS
NRBC BLD AUTO-RTO: 0 /100 WBCS
OSMOLALITY UR/SERPL-RTO: 251 MMOL/KG (ref 282–298)
OSMOLALITY UR/SERPL-RTO: 253 MMOL/KG (ref 282–298)
OSMOLALITY UR: 311 MMOL/KG
P AXIS: 78 DEGREES
PLATELET # BLD AUTO: 118 THOUSANDS/UL (ref 149–390)
PMV BLD AUTO: 9.1 FL (ref 8.9–12.7)
PMV BLD AUTO: 9.4 FL (ref 8.9–12.7)
POTASSIUM SERPL-SCNC: 2.9 MMOL/L (ref 3.5–5.3)
POTASSIUM SERPL-SCNC: 3.4 MMOL/L (ref 3.5–5.3)
POTASSIUM SERPL-SCNC: 3.5 MMOL/L (ref 3.5–5.3)
POTASSIUM SERPL-SCNC: 3.6 MMOL/L (ref 3.5–5.3)
PR INTERVAL: 166 MS
QRS AXIS: 47 DEGREES
QRSD INTERVAL: 102 MS
QT INTERVAL: 376 MS
QTC INTERVAL: 480 MS
RBC # BLD AUTO: 3.87 MILLION/UL (ref 3.88–5.62)
RBC # BLD AUTO: 3.91 MILLION/UL (ref 3.88–5.62)
SODIUM 24H UR-SCNC: 34 MOL/L
SODIUM SERPL-SCNC: 114 MMOL/L (ref 136–145)
SODIUM SERPL-SCNC: 115 MMOL/L (ref 136–145)
SODIUM SERPL-SCNC: 115 MMOL/L (ref 136–145)
SODIUM SERPL-SCNC: 116 MMOL/L (ref 136–145)
SODIUM SERPL-SCNC: 119 MMOL/L (ref 136–145)
SODIUM SERPL-SCNC: 120 MMOL/L (ref 136–145)
T WAVE AXIS: 61 DEGREES
TROPONIN I SERPL-MCNC: 0.04 NG/ML
TROPONIN I SERPL-MCNC: 0.06 NG/ML
VENTRICULAR RATE: 98 BPM
WBC # BLD AUTO: 11.16 THOUSAND/UL (ref 4.31–10.16)
WBC # BLD AUTO: 11.49 THOUSAND/UL (ref 4.31–10.16)

## 2017-07-29 PROCEDURE — 94760 N-INVAS EAR/PLS OXIMETRY 1: CPT

## 2017-07-29 PROCEDURE — 83930 ASSAY OF BLOOD OSMOLALITY: CPT | Performed by: PHYSICIAN ASSISTANT

## 2017-07-29 PROCEDURE — 82553 CREATINE MB FRACTION: CPT | Performed by: PHYSICIAN ASSISTANT

## 2017-07-29 PROCEDURE — 84484 ASSAY OF TROPONIN QUANT: CPT | Performed by: EMERGENCY MEDICINE

## 2017-07-29 PROCEDURE — 96365 THER/PROPH/DIAG IV INF INIT: CPT

## 2017-07-29 PROCEDURE — 36415 COLL VENOUS BLD VENIPUNCTURE: CPT | Performed by: EMERGENCY MEDICINE

## 2017-07-29 PROCEDURE — 82948 REAGENT STRIP/BLOOD GLUCOSE: CPT

## 2017-07-29 PROCEDURE — 85025 COMPLETE CBC W/AUTO DIFF WBC: CPT | Performed by: EMERGENCY MEDICINE

## 2017-07-29 PROCEDURE — 84300 ASSAY OF URINE SODIUM: CPT | Performed by: PHYSICIAN ASSISTANT

## 2017-07-29 PROCEDURE — 96375 TX/PRO/DX INJ NEW DRUG ADDON: CPT

## 2017-07-29 PROCEDURE — 73564 X-RAY EXAM KNEE 4 OR MORE: CPT

## 2017-07-29 PROCEDURE — 83605 ASSAY OF LACTIC ACID: CPT | Performed by: EMERGENCY MEDICINE

## 2017-07-29 PROCEDURE — 99285 EMERGENCY DEPT VISIT HI MDM: CPT

## 2017-07-29 PROCEDURE — 80074 ACUTE HEPATITIS PANEL: CPT | Performed by: ANESTHESIOLOGY

## 2017-07-29 PROCEDURE — 95951 HB EEG MONITORING/VIDEORECORD: CPT

## 2017-07-29 PROCEDURE — 82550 ASSAY OF CK (CPK): CPT | Performed by: PHYSICIAN ASSISTANT

## 2017-07-29 PROCEDURE — 70450 CT HEAD/BRAIN W/O DYE: CPT

## 2017-07-29 PROCEDURE — 83935 ASSAY OF URINE OSMOLALITY: CPT | Performed by: PHYSICIAN ASSISTANT

## 2017-07-29 PROCEDURE — 80048 BASIC METABOLIC PNL TOTAL CA: CPT | Performed by: INTERNAL MEDICINE

## 2017-07-29 PROCEDURE — C9113 INJ PANTOPRAZOLE SODIUM, VIA: HCPCS | Performed by: PHYSICIAN ASSISTANT

## 2017-07-29 PROCEDURE — 80048 BASIC METABOLIC PNL TOTAL CA: CPT | Performed by: PHYSICIAN ASSISTANT

## 2017-07-29 PROCEDURE — 83735 ASSAY OF MAGNESIUM: CPT | Performed by: EMERGENCY MEDICINE

## 2017-07-29 PROCEDURE — 82140 ASSAY OF AMMONIA: CPT | Performed by: PHYSICIAN ASSISTANT

## 2017-07-29 PROCEDURE — 80048 BASIC METABOLIC PNL TOTAL CA: CPT | Performed by: EMERGENCY MEDICINE

## 2017-07-29 PROCEDURE — 93005 ELECTROCARDIOGRAM TRACING: CPT | Performed by: EMERGENCY MEDICINE

## 2017-07-29 PROCEDURE — 93306 TTE W/DOPPLER COMPLETE: CPT

## 2017-07-29 RX ORDER — POTASSIUM CHLORIDE 14.9 MG/ML
20 INJECTION INTRAVENOUS ONCE
Status: COMPLETED | OUTPATIENT
Start: 2017-07-29 | End: 2017-07-29

## 2017-07-29 RX ORDER — SODIUM CHLORIDE 3 G/100ML
65 INJECTION, SOLUTION INTRAVENOUS ONCE
Status: DISCONTINUED | OUTPATIENT
Start: 2017-07-29 | End: 2017-07-29

## 2017-07-29 RX ORDER — PANTOPRAZOLE SODIUM 40 MG/1
40 INJECTION, POWDER, FOR SOLUTION INTRAVENOUS DAILY
Status: DISCONTINUED | OUTPATIENT
Start: 2017-07-29 | End: 2017-07-31

## 2017-07-29 RX ORDER — LIDOCAINE 50 MG/G
1 PATCH TOPICAL DAILY
Status: DISCONTINUED | OUTPATIENT
Start: 2017-07-30 | End: 2017-08-05 | Stop reason: HOSPADM

## 2017-07-29 RX ORDER — LORAZEPAM 2 MG/ML
2 INJECTION INTRAMUSCULAR ONCE
Status: COMPLETED | OUTPATIENT
Start: 2017-07-29 | End: 2017-07-29

## 2017-07-29 RX ORDER — FENTANYL CITRATE 50 UG/ML
25 INJECTION, SOLUTION INTRAMUSCULAR; INTRAVENOUS EVERY 2 HOUR PRN
Status: DISCONTINUED | OUTPATIENT
Start: 2017-07-29 | End: 2017-07-30

## 2017-07-29 RX ORDER — LORAZEPAM 2 MG/ML
1 INJECTION INTRAMUSCULAR EVERY 4 HOURS PRN
Status: DISCONTINUED | OUTPATIENT
Start: 2017-07-29 | End: 2017-07-29

## 2017-07-29 RX ORDER — LORAZEPAM 2 MG/ML
2 INJECTION INTRAMUSCULAR EVERY 6 HOURS
Status: DISCONTINUED | OUTPATIENT
Start: 2017-07-29 | End: 2017-07-30

## 2017-07-29 RX ORDER — LORAZEPAM 2 MG/ML
2 INJECTION INTRAMUSCULAR ONCE AS NEEDED
Status: DISCONTINUED | OUTPATIENT
Start: 2017-07-29 | End: 2017-07-30

## 2017-07-29 RX ORDER — LIDOCAINE 50 MG/G
1 PATCH TOPICAL DAILY
Status: DISCONTINUED | OUTPATIENT
Start: 2017-07-29 | End: 2017-07-29

## 2017-07-29 RX ORDER — SODIUM CHLORIDE 450 MG/100ML
75 INJECTION, SOLUTION INTRAVENOUS CONTINUOUS
Status: DISCONTINUED | OUTPATIENT
Start: 2017-07-29 | End: 2017-07-29

## 2017-07-29 RX ORDER — SODIUM CHLORIDE 9 MG/ML
100 INJECTION, SOLUTION INTRAVENOUS CONTINUOUS
Status: DISCONTINUED | OUTPATIENT
Start: 2017-07-29 | End: 2017-07-29

## 2017-07-29 RX ORDER — LORAZEPAM 2 MG/ML
4 INJECTION INTRAMUSCULAR ONCE
Status: COMPLETED | OUTPATIENT
Start: 2017-07-29 | End: 2017-07-29

## 2017-07-29 RX ORDER — THIAMINE HYDROCHLORIDE 100 MG/ML
100 INJECTION, SOLUTION INTRAMUSCULAR; INTRAVENOUS DAILY
Status: DISCONTINUED | OUTPATIENT
Start: 2017-07-29 | End: 2017-07-29

## 2017-07-29 RX ORDER — FENTANYL CITRATE 50 UG/ML
50 INJECTION, SOLUTION INTRAMUSCULAR; INTRAVENOUS ONCE
Status: COMPLETED | OUTPATIENT
Start: 2017-07-29 | End: 2017-07-29

## 2017-07-29 RX ORDER — LORAZEPAM 2 MG/ML
2 INJECTION INTRAMUSCULAR EVERY 4 HOURS PRN
Status: DISCONTINUED | OUTPATIENT
Start: 2017-07-29 | End: 2017-07-30

## 2017-07-29 RX ORDER — POTASSIUM CHLORIDE 29.8 MG/ML
40 INJECTION INTRAVENOUS ONCE
Status: DISCONTINUED | OUTPATIENT
Start: 2017-07-29 | End: 2017-07-29

## 2017-07-29 RX ORDER — ACETAMINOPHEN 650 MG/1
650 SUPPOSITORY RECTAL EVERY 4 HOURS PRN
Status: DISCONTINUED | OUTPATIENT
Start: 2017-07-29 | End: 2017-07-31

## 2017-07-29 RX ORDER — FENTANYL CITRATE 50 UG/ML
50 INJECTION, SOLUTION INTRAMUSCULAR; INTRAVENOUS EVERY 2 HOUR PRN
Status: DISCONTINUED | OUTPATIENT
Start: 2017-07-29 | End: 2017-07-30

## 2017-07-29 RX ADMIN — LORAZEPAM 2 MG: 2 INJECTION INTRAMUSCULAR; INTRAVENOUS at 08:29

## 2017-07-29 RX ADMIN — THIAMINE HYDROCHLORIDE 100 MG: 100 INJECTION, SOLUTION INTRAMUSCULAR; INTRAVENOUS at 08:47

## 2017-07-29 RX ADMIN — POTASSIUM CHLORIDE 20 MEQ: 200 INJECTION, SOLUTION INTRAVENOUS at 16:05

## 2017-07-29 RX ADMIN — LEVETIRACETAM 750 MG: 100 INJECTION, SOLUTION INTRAVENOUS at 20:58

## 2017-07-29 RX ADMIN — ACETAMINOPHEN 650 MG: 650 SUPPOSITORY RECTAL at 10:20

## 2017-07-29 RX ADMIN — POTASSIUM CHLORIDE 20 MEQ: 200 INJECTION, SOLUTION INTRAVENOUS at 03:19

## 2017-07-29 RX ADMIN — LEVETIRACETAM 1500 MG: 100 INJECTION, SOLUTION INTRAVENOUS at 05:28

## 2017-07-29 RX ADMIN — ENOXAPARIN SODIUM 40 MG: 40 INJECTION SUBCUTANEOUS at 08:37

## 2017-07-29 RX ADMIN — FENTANYL CITRATE 50 MCG: 50 INJECTION INTRAMUSCULAR; INTRAVENOUS at 00:54

## 2017-07-29 RX ADMIN — LIDOCAINE 1 PATCH: 50 PATCH CUTANEOUS at 08:34

## 2017-07-29 RX ADMIN — POTASSIUM CHLORIDE 20 MEQ: 200 INJECTION, SOLUTION INTRAVENOUS at 13:28

## 2017-07-29 RX ADMIN — LORAZEPAM 1 MG: 2 INJECTION INTRAMUSCULAR; INTRAVENOUS at 06:28

## 2017-07-29 RX ADMIN — LEVETIRACETAM 750 MG: 100 INJECTION, SOLUTION INTRAVENOUS at 12:11

## 2017-07-29 RX ADMIN — FOLIC ACID 1 MG: 5 INJECTION, SOLUTION INTRAMUSCULAR; INTRAVENOUS; SUBCUTANEOUS at 04:09

## 2017-07-29 RX ADMIN — SODIUM CHLORIDE 100 ML/HR: 0.45 INJECTION, SOLUTION INTRAVENOUS at 04:13

## 2017-07-29 RX ADMIN — PANTOPRAZOLE SODIUM 40 MG: 40 INJECTION, POWDER, FOR SOLUTION INTRAVENOUS at 04:45

## 2017-07-29 RX ADMIN — POTASSIUM CHLORIDE 20 MEQ: 200 INJECTION, SOLUTION INTRAVENOUS at 10:20

## 2017-07-29 RX ADMIN — POTASSIUM CHLORIDE 20 MEQ: 200 INJECTION, SOLUTION INTRAVENOUS at 01:02

## 2017-07-29 RX ADMIN — SODIUM CHLORIDE 100 ML/HR: 0.9 INJECTION, SOLUTION INTRAVENOUS at 01:02

## 2017-07-29 RX ADMIN — LORAZEPAM 2 MG: 2 INJECTION INTRAMUSCULAR; INTRAVENOUS at 11:20

## 2017-07-29 RX ADMIN — LORAZEPAM 2 MG: 2 INJECTION INTRAMUSCULAR; INTRAVENOUS at 17:55

## 2017-07-29 RX ADMIN — LORAZEPAM 4 MG: 2 INJECTION INTRAMUSCULAR; INTRAVENOUS at 04:00

## 2017-07-29 RX ADMIN — LORAZEPAM 2 MG: 2 INJECTION INTRAMUSCULAR; INTRAVENOUS at 21:19

## 2017-07-29 RX ADMIN — FENTANYL CITRATE 50 MCG: 50 INJECTION INTRAMUSCULAR; INTRAVENOUS at 14:36

## 2017-07-29 RX ADMIN — FENTANYL CITRATE 50 MCG: 50 INJECTION INTRAMUSCULAR; INTRAVENOUS at 03:09

## 2017-07-30 ENCOUNTER — GENERIC CONVERSION - ENCOUNTER (OUTPATIENT)
Dept: OTHER | Facility: OTHER | Age: 52
End: 2017-07-30

## 2017-07-30 ENCOUNTER — APPOINTMENT (INPATIENT)
Dept: NEUROLOGY | Facility: AMBULATORY SURGERY CENTER | Age: 52
DRG: 347 | End: 2017-07-30
Payer: COMMERCIAL

## 2017-07-30 ENCOUNTER — APPOINTMENT (INPATIENT)
Dept: RADIOLOGY | Facility: HOSPITAL | Age: 52
DRG: 347 | End: 2017-07-30
Payer: COMMERCIAL

## 2017-07-30 LAB
ANION GAP SERPL CALCULATED.3IONS-SCNC: 12 MMOL/L (ref 4–13)
ANION GAP SERPL CALCULATED.3IONS-SCNC: 13 MMOL/L (ref 4–13)
ANION GAP SERPL CALCULATED.3IONS-SCNC: 14 MMOL/L (ref 4–13)
BUN SERPL-MCNC: 4 MG/DL (ref 5–25)
BUN SERPL-MCNC: 5 MG/DL (ref 5–25)
BUN SERPL-MCNC: 5 MG/DL (ref 5–25)
CA-I BLD-SCNC: 1.1 MMOL/L (ref 1.12–1.32)
CA-I BLD-SCNC: 1.12 MMOL/L (ref 1.12–1.32)
CALCIUM SERPL-MCNC: 8.4 MG/DL (ref 8.3–10.1)
CALCIUM SERPL-MCNC: 8.8 MG/DL (ref 8.3–10.1)
CALCIUM SERPL-MCNC: 8.9 MG/DL (ref 8.3–10.1)
CHLORIDE SERPL-SCNC: 83 MMOL/L (ref 100–108)
CHLORIDE SERPL-SCNC: 84 MMOL/L (ref 100–108)
CHLORIDE SERPL-SCNC: 86 MMOL/L (ref 100–108)
CO2 SERPL-SCNC: 25 MMOL/L (ref 21–32)
CO2 SERPL-SCNC: 26 MMOL/L (ref 21–32)
CO2 SERPL-SCNC: 26 MMOL/L (ref 21–32)
CREAT SERPL-MCNC: 0.38 MG/DL (ref 0.6–1.3)
CREAT SERPL-MCNC: 0.41 MG/DL (ref 0.6–1.3)
CREAT SERPL-MCNC: 0.43 MG/DL (ref 0.6–1.3)
ERYTHROCYTE [DISTWIDTH] IN BLOOD BY AUTOMATED COUNT: 21.6 % (ref 11.6–15.1)
GFR SERPL CREATININE-BSD FRML MDRD: 133 ML/MIN/1.73SQ M
GFR SERPL CREATININE-BSD FRML MDRD: 136 ML/MIN/1.73SQ M
GFR SERPL CREATININE-BSD FRML MDRD: 140 ML/MIN/1.73SQ M
GLUCOSE SERPL-MCNC: 80 MG/DL (ref 65–140)
GLUCOSE SERPL-MCNC: 82 MG/DL (ref 65–140)
GLUCOSE SERPL-MCNC: 82 MG/DL (ref 65–140)
GLUCOSE SERPL-MCNC: 88 MG/DL (ref 65–140)
HAV IGM SER QL: NORMAL
HBV CORE IGM SER QL: NORMAL
HBV SURFACE AG SER QL: NORMAL
HCT VFR BLD AUTO: 27.8 % (ref 36.5–49.3)
HCV AB SER QL: NORMAL
HGB BLD-MCNC: 8.1 G/DL (ref 12–17)
MAGNESIUM SERPL-MCNC: 1.7 MG/DL (ref 1.6–2.6)
MAGNESIUM SERPL-MCNC: 2.3 MG/DL (ref 1.6–2.6)
MCH RBC QN AUTO: 20.3 PG (ref 26.8–34.3)
MCHC RBC AUTO-ENTMCNC: 29.1 G/DL (ref 31.4–37.4)
MCV RBC AUTO: 70 FL (ref 82–98)
PHOSPHATE SERPL-MCNC: 1.6 MG/DL (ref 2.7–4.5)
PHOSPHATE SERPL-MCNC: 3.2 MG/DL (ref 2.7–4.5)
PLATELET # BLD AUTO: 144 THOUSANDS/UL (ref 149–390)
PMV BLD AUTO: 9.7 FL (ref 8.9–12.7)
POTASSIUM SERPL-SCNC: 3 MMOL/L (ref 3.5–5.3)
POTASSIUM SERPL-SCNC: 3.2 MMOL/L (ref 3.5–5.3)
POTASSIUM SERPL-SCNC: 3.4 MMOL/L (ref 3.5–5.3)
RBC # BLD AUTO: 3.99 MILLION/UL (ref 3.88–5.62)
SODIUM SERPL-SCNC: 122 MMOL/L (ref 136–145)
SODIUM SERPL-SCNC: 123 MMOL/L (ref 136–145)
SODIUM SERPL-SCNC: 124 MMOL/L (ref 136–145)
WBC # BLD AUTO: 6.94 THOUSAND/UL (ref 4.31–10.16)

## 2017-07-30 PROCEDURE — 84100 ASSAY OF PHOSPHORUS: CPT | Performed by: NURSE PRACTITIONER

## 2017-07-30 PROCEDURE — 80048 BASIC METABOLIC PNL TOTAL CA: CPT | Performed by: NURSE PRACTITIONER

## 2017-07-30 PROCEDURE — C9113 INJ PANTOPRAZOLE SODIUM, VIA: HCPCS | Performed by: PHYSICIAN ASSISTANT

## 2017-07-30 PROCEDURE — 83735 ASSAY OF MAGNESIUM: CPT | Performed by: NURSE PRACTITIONER

## 2017-07-30 PROCEDURE — 72070 X-RAY EXAM THORAC SPINE 2VWS: CPT

## 2017-07-30 PROCEDURE — 82330 ASSAY OF CALCIUM: CPT | Performed by: NURSE PRACTITIONER

## 2017-07-30 PROCEDURE — 95951 HB EEG MONITORING/VIDEORECORD: CPT

## 2017-07-30 PROCEDURE — 92610 EVALUATE SWALLOWING FUNCTION: CPT

## 2017-07-30 PROCEDURE — 80048 BASIC METABOLIC PNL TOTAL CA: CPT | Performed by: INTERNAL MEDICINE

## 2017-07-30 PROCEDURE — 72050 X-RAY EXAM NECK SPINE 4/5VWS: CPT

## 2017-07-30 PROCEDURE — 82948 REAGENT STRIP/BLOOD GLUCOSE: CPT

## 2017-07-30 PROCEDURE — 85027 COMPLETE CBC AUTOMATED: CPT | Performed by: NURSE PRACTITIONER

## 2017-07-30 RX ORDER — MAGNESIUM SULFATE HEPTAHYDRATE 40 MG/ML
4 INJECTION, SOLUTION INTRAVENOUS ONCE
Status: COMPLETED | OUTPATIENT
Start: 2017-07-30 | End: 2017-07-30

## 2017-07-30 RX ORDER — POTASSIUM CHLORIDE 14.9 MG/ML
20 INJECTION INTRAVENOUS ONCE
Status: COMPLETED | OUTPATIENT
Start: 2017-07-30 | End: 2017-07-30

## 2017-07-30 RX ORDER — LORAZEPAM 2 MG/ML
1 INJECTION INTRAMUSCULAR EVERY 6 HOURS PRN
Status: DISCONTINUED | OUTPATIENT
Start: 2017-07-30 | End: 2017-08-05 | Stop reason: HOSPADM

## 2017-07-30 RX ORDER — LORAZEPAM 2 MG/ML
1 INJECTION INTRAMUSCULAR 3 TIMES DAILY
Status: DISCONTINUED | OUTPATIENT
Start: 2017-07-30 | End: 2017-07-31

## 2017-07-30 RX ADMIN — LORAZEPAM 1 MG: 2 INJECTION INTRAMUSCULAR; INTRAVENOUS at 16:20

## 2017-07-30 RX ADMIN — MAGNESIUM SULFATE HEPTAHYDRATE 4 G: 40 INJECTION, SOLUTION INTRAVENOUS at 07:17

## 2017-07-30 RX ADMIN — PANTOPRAZOLE SODIUM 40 MG: 40 INJECTION, POWDER, FOR SOLUTION INTRAVENOUS at 08:52

## 2017-07-30 RX ADMIN — LORAZEPAM 1 MG: 2 INJECTION INTRAMUSCULAR; INTRAVENOUS at 21:04

## 2017-07-30 RX ADMIN — POTASSIUM CHLORIDE 20 MEQ: 200 INJECTION, SOLUTION INTRAVENOUS at 07:18

## 2017-07-30 RX ADMIN — THIAMINE HYDROCHLORIDE 100 MG: 100 INJECTION, SOLUTION INTRAMUSCULAR; INTRAVENOUS at 08:28

## 2017-07-30 RX ADMIN — LEVETIRACETAM 750 MG: 100 INJECTION, SOLUTION INTRAVENOUS at 09:11

## 2017-07-30 RX ADMIN — LEVETIRACETAM 750 MG: 100 INJECTION, SOLUTION INTRAVENOUS at 21:04

## 2017-07-30 RX ADMIN — CALCIUM GLUCONATE 2 G: 94 INJECTION, SOLUTION INTRAVENOUS at 09:45

## 2017-07-30 RX ADMIN — LORAZEPAM 2 MG: 2 INJECTION INTRAMUSCULAR; INTRAVENOUS at 00:44

## 2017-07-30 RX ADMIN — LIDOCAINE 1 PATCH: 50 PATCH CUTANEOUS at 08:51

## 2017-07-30 RX ADMIN — POTASSIUM PHOSPHATE, MONOBASIC AND POTASSIUM PHOSPHATE, DIBASIC 30 MMOL: 224; 236 INJECTION, SOLUTION INTRAVENOUS at 09:09

## 2017-07-30 RX ADMIN — LORAZEPAM 2 MG: 2 INJECTION INTRAMUSCULAR; INTRAVENOUS at 05:18

## 2017-07-30 RX ADMIN — ENOXAPARIN SODIUM 40 MG: 40 INJECTION SUBCUTANEOUS at 08:52

## 2017-07-31 ENCOUNTER — APPOINTMENT (INPATIENT)
Dept: NON INVASIVE DIAGNOSTICS | Facility: HOSPITAL | Age: 52
DRG: 347 | End: 2017-07-31
Payer: COMMERCIAL

## 2017-07-31 LAB
ANION GAP SERPL CALCULATED.3IONS-SCNC: 11 MMOL/L (ref 4–13)
ANION GAP SERPL CALCULATED.3IONS-SCNC: 13 MMOL/L (ref 4–13)
ATRIAL RATE: 114 BPM
BUN SERPL-MCNC: 4 MG/DL (ref 5–25)
BUN SERPL-MCNC: 6 MG/DL (ref 5–25)
CA-I BLD-SCNC: 1.12 MMOL/L (ref 1.12–1.32)
CALCIUM SERPL-MCNC: 8.6 MG/DL (ref 8.3–10.1)
CALCIUM SERPL-MCNC: 8.8 MG/DL (ref 8.3–10.1)
CHLORIDE SERPL-SCNC: 86 MMOL/L (ref 100–108)
CHLORIDE SERPL-SCNC: 86 MMOL/L (ref 100–108)
CO2 SERPL-SCNC: 25 MMOL/L (ref 21–32)
CO2 SERPL-SCNC: 26 MMOL/L (ref 21–32)
CREAT SERPL-MCNC: 0.39 MG/DL (ref 0.6–1.3)
CREAT SERPL-MCNC: 0.81 MG/DL (ref 0.6–1.3)
ERYTHROCYTE [DISTWIDTH] IN BLOOD BY AUTOMATED COUNT: 21.9 % (ref 11.6–15.1)
ETHYLENE GLYCOL SERPLBLD-MCNC: NEGATIVE UG/ML
GFR SERPL CREATININE-BSD FRML MDRD: 103 ML/MIN/1.73SQ M
GFR SERPL CREATININE-BSD FRML MDRD: 139 ML/MIN/1.73SQ M
GLUCOSE SERPL-MCNC: 125 MG/DL (ref 65–140)
GLUCOSE SERPL-MCNC: 140 MG/DL (ref 65–140)
GLUCOSE SERPL-MCNC: 87 MG/DL (ref 65–140)
GLUCOSE SERPL-MCNC: 98 MG/DL (ref 65–140)
GLYCOLATE SERPL-MCNC: NEGATIVE
HCT VFR BLD AUTO: 27.1 % (ref 36.5–49.3)
HGB BLD-MCNC: 8 G/DL (ref 12–17)
MAGNESIUM SERPL-MCNC: 1.9 MG/DL (ref 1.6–2.6)
MCH RBC QN AUTO: 20.7 PG (ref 26.8–34.3)
MCHC RBC AUTO-ENTMCNC: 29.5 G/DL (ref 31.4–37.4)
MCV RBC AUTO: 70 FL (ref 82–98)
P AXIS: 81 DEGREES
PHOSPHATE SERPL-MCNC: 2.3 MG/DL (ref 2.7–4.5)
PLATELET # BLD AUTO: 145 THOUSANDS/UL (ref 149–390)
PMV BLD AUTO: 9.3 FL (ref 8.9–12.7)
POTASSIUM SERPL-SCNC: 2.9 MMOL/L (ref 3.5–5.3)
POTASSIUM SERPL-SCNC: 3.2 MMOL/L (ref 3.5–5.3)
PR INTERVAL: 160 MS
QRS AXIS: -63 DEGREES
QRSD INTERVAL: 94 MS
QT INTERVAL: 342 MS
QTC INTERVAL: 471 MS
RBC # BLD AUTO: 3.87 MILLION/UL (ref 3.88–5.62)
SODIUM SERPL-SCNC: 123 MMOL/L (ref 136–145)
SODIUM SERPL-SCNC: 124 MMOL/L (ref 136–145)
T WAVE AXIS: 72 DEGREES
VENTRICULAR RATE: 114 BPM
WBC # BLD AUTO: 6.58 THOUSAND/UL (ref 4.31–10.16)

## 2017-07-31 PROCEDURE — 82330 ASSAY OF CALCIUM: CPT | Performed by: NURSE PRACTITIONER

## 2017-07-31 PROCEDURE — G8988 SELF CARE GOAL STATUS: HCPCS

## 2017-07-31 PROCEDURE — 92526 ORAL FUNCTION THERAPY: CPT

## 2017-07-31 PROCEDURE — G8978 MOBILITY CURRENT STATUS: HCPCS

## 2017-07-31 PROCEDURE — 93880 EXTRACRANIAL BILAT STUDY: CPT

## 2017-07-31 PROCEDURE — 80048 BASIC METABOLIC PNL TOTAL CA: CPT | Performed by: NURSE PRACTITIONER

## 2017-07-31 PROCEDURE — 85027 COMPLETE CBC AUTOMATED: CPT | Performed by: NURSE PRACTITIONER

## 2017-07-31 PROCEDURE — 83735 ASSAY OF MAGNESIUM: CPT | Performed by: NURSE PRACTITIONER

## 2017-07-31 PROCEDURE — 97167 OT EVAL HIGH COMPLEX 60 MIN: CPT

## 2017-07-31 PROCEDURE — 84100 ASSAY OF PHOSPHORUS: CPT | Performed by: NURSE PRACTITIONER

## 2017-07-31 PROCEDURE — G8987 SELF CARE CURRENT STATUS: HCPCS

## 2017-07-31 PROCEDURE — 97163 PT EVAL HIGH COMPLEX 45 MIN: CPT

## 2017-07-31 PROCEDURE — 82948 REAGENT STRIP/BLOOD GLUCOSE: CPT

## 2017-07-31 PROCEDURE — C9113 INJ PANTOPRAZOLE SODIUM, VIA: HCPCS | Performed by: PHYSICIAN ASSISTANT

## 2017-07-31 PROCEDURE — G8979 MOBILITY GOAL STATUS: HCPCS

## 2017-07-31 RX ORDER — POTASSIUM CHLORIDE 20 MEQ/1
40 TABLET, EXTENDED RELEASE ORAL ONCE
Status: COMPLETED | OUTPATIENT
Start: 2017-07-31 | End: 2017-07-31

## 2017-07-31 RX ORDER — POTASSIUM CHLORIDE 14.9 MG/ML
20 INJECTION INTRAVENOUS ONCE
Status: DISCONTINUED | OUTPATIENT
Start: 2017-07-31 | End: 2017-07-31

## 2017-07-31 RX ORDER — MAGNESIUM SULFATE 1 G/100ML
1 INJECTION INTRAVENOUS ONCE
Status: COMPLETED | OUTPATIENT
Start: 2017-07-31 | End: 2017-08-01

## 2017-07-31 RX ORDER — FLUOXETINE 10 MG/1
10 CAPSULE ORAL DAILY
Status: DISCONTINUED | OUTPATIENT
Start: 2017-07-31 | End: 2017-08-05 | Stop reason: HOSPADM

## 2017-07-31 RX ORDER — LEVETIRACETAM 750 MG/1
750 TABLET ORAL EVERY 12 HOURS SCHEDULED
Status: DISCONTINUED | OUTPATIENT
Start: 2017-07-31 | End: 2017-08-05 | Stop reason: HOSPADM

## 2017-07-31 RX ORDER — POTASSIUM CHLORIDE 20 MEQ/1
40 TABLET, EXTENDED RELEASE ORAL 2 TIMES DAILY WITH MEALS
Status: DISCONTINUED | OUTPATIENT
Start: 2017-07-31 | End: 2017-07-31

## 2017-07-31 RX ORDER — ACETAMINOPHEN 325 MG/1
650 TABLET ORAL EVERY 6 HOURS PRN
Status: DISCONTINUED | OUTPATIENT
Start: 2017-07-31 | End: 2017-08-05 | Stop reason: HOSPADM

## 2017-07-31 RX ORDER — POTASSIUM CHLORIDE 20 MEQ/1
40 TABLET, EXTENDED RELEASE ORAL ONCE
Status: DISCONTINUED | OUTPATIENT
Start: 2017-07-31 | End: 2017-07-31

## 2017-07-31 RX ORDER — SODIUM CHLORIDE, SODIUM GLUCONATE, SODIUM ACETATE, POTASSIUM CHLORIDE, MAGNESIUM CHLORIDE, SODIUM PHOSPHATE, DIBASIC, AND POTASSIUM PHOSPHATE .53; .5; .37; .037; .03; .012; .00082 G/100ML; G/100ML; G/100ML; G/100ML; G/100ML; G/100ML; G/100ML
1000 INJECTION, SOLUTION INTRAVENOUS ONCE
Status: COMPLETED | OUTPATIENT
Start: 2017-07-31 | End: 2017-07-31

## 2017-07-31 RX ORDER — THIAMINE MONONITRATE (VIT B1) 100 MG
100 TABLET ORAL DAILY
Status: DISCONTINUED | OUTPATIENT
Start: 2017-08-01 | End: 2017-08-05 | Stop reason: HOSPADM

## 2017-07-31 RX ORDER — LISINOPRIL 10 MG/1
10 TABLET ORAL DAILY
Status: DISCONTINUED | OUTPATIENT
Start: 2017-07-31 | End: 2017-07-31

## 2017-07-31 RX ORDER — FOLIC ACID 1 MG/1
1 TABLET ORAL DAILY
Status: DISCONTINUED | OUTPATIENT
Start: 2017-08-01 | End: 2017-08-05 | Stop reason: HOSPADM

## 2017-07-31 RX ORDER — SODIUM CHLORIDE, SODIUM GLUCONATE, SODIUM ACETATE, POTASSIUM CHLORIDE, MAGNESIUM CHLORIDE, SODIUM PHOSPHATE, DIBASIC, AND POTASSIUM PHOSPHATE .53; .5; .37; .037; .03; .012; .00082 G/100ML; G/100ML; G/100ML; G/100ML; G/100ML; G/100ML; G/100ML
75 INJECTION, SOLUTION INTRAVENOUS CONTINUOUS
Status: DISCONTINUED | OUTPATIENT
Start: 2017-07-31 | End: 2017-07-31

## 2017-07-31 RX ADMIN — LIDOCAINE 1 PATCH: 50 PATCH CUTANEOUS at 09:03

## 2017-07-31 RX ADMIN — Medication 400 MG: at 12:42

## 2017-07-31 RX ADMIN — SODIUM CHLORIDE, SODIUM GLUCONATE, SODIUM ACETATE, POTASSIUM CHLORIDE, MAGNESIUM CHLORIDE, SODIUM PHOSPHATE, DIBASIC, AND POTASSIUM PHOSPHATE 1000 ML: .53; .5; .37; .037; .03; .012; .00082 INJECTION, SOLUTION INTRAVENOUS at 16:53

## 2017-07-31 RX ADMIN — PANTOPRAZOLE SODIUM 40 MG: 40 INJECTION, POWDER, FOR SOLUTION INTRAVENOUS at 09:04

## 2017-07-31 RX ADMIN — POTASSIUM CHLORIDE 40 MEQ: 1500 TABLET, EXTENDED RELEASE ORAL at 12:42

## 2017-07-31 RX ADMIN — MAGNESIUM SULFATE HEPTAHYDRATE 1 G: 1 INJECTION, SOLUTION INTRAVENOUS at 09:05

## 2017-07-31 RX ADMIN — POTASSIUM PHOSPHATE, MONOBASIC AND POTASSIUM PHOSPHATE, DIBASIC 12 MMOL: 224; 236 INJECTION, SOLUTION INTRAVENOUS at 11:16

## 2017-07-31 RX ADMIN — THIAMINE HYDROCHLORIDE 100 MG: 100 INJECTION, SOLUTION INTRAMUSCULAR; INTRAVENOUS at 10:31

## 2017-07-31 RX ADMIN — POTASSIUM CHLORIDE 40 MEQ: 1500 TABLET, EXTENDED RELEASE ORAL at 21:44

## 2017-07-31 RX ADMIN — Medication 400 MG: at 20:35

## 2017-07-31 RX ADMIN — ENOXAPARIN SODIUM 40 MG: 40 INJECTION SUBCUTANEOUS at 09:04

## 2017-07-31 RX ADMIN — FLUOXETINE 10 MG: 10 CAPSULE ORAL at 14:35

## 2017-07-31 RX ADMIN — LISINOPRIL 10 MG: 10 TABLET ORAL at 14:35

## 2017-07-31 RX ADMIN — LEVETIRACETAM 750 MG: 100 INJECTION, SOLUTION INTRAVENOUS at 09:08

## 2017-07-31 RX ADMIN — LEVETIRACETAM 750 MG: 750 TABLET, FILM COATED ORAL at 20:35

## 2017-08-01 LAB
ANION GAP SERPL CALCULATED.3IONS-SCNC: 12 MMOL/L (ref 4–13)
BUN SERPL-MCNC: 5 MG/DL (ref 5–25)
CA-I BLD-SCNC: 1.06 MMOL/L (ref 1.12–1.32)
CALCIUM SERPL-MCNC: 8.9 MG/DL (ref 8.3–10.1)
CHLORIDE SERPL-SCNC: 84 MMOL/L (ref 100–108)
CO2 SERPL-SCNC: 28 MMOL/L (ref 21–32)
CREAT SERPL-MCNC: 0.45 MG/DL (ref 0.6–1.3)
CREAT UR-MCNC: 104 MG/DL
GFR SERPL CREATININE-BSD FRML MDRD: 131 ML/MIN/1.73SQ M
GLUCOSE SERPL-MCNC: 126 MG/DL (ref 65–140)
MAGNESIUM SERPL-MCNC: 1.6 MG/DL (ref 1.6–2.6)
OSMOLALITY UR: 364 MMOL/KG
PHOSPHATE SERPL-MCNC: 2.2 MG/DL (ref 2.7–4.5)
POTASSIUM SERPL-SCNC: 3 MMOL/L (ref 3.5–5.3)
POTASSIUM UR-SCNC: 38.2 MMOL/L (ref 1–300)
SODIUM 24H UR-SCNC: 28 MOL/L
SODIUM SERPL-SCNC: 124 MMOL/L (ref 136–145)
TSH SERPL DL<=0.05 MIU/L-ACNC: 3.76 UIU/ML (ref 0.36–3.74)
URATE SERPL-MCNC: 2.4 MG/DL (ref 4.2–8)

## 2017-08-01 PROCEDURE — 83735 ASSAY OF MAGNESIUM: CPT | Performed by: NURSE PRACTITIONER

## 2017-08-01 PROCEDURE — 84443 ASSAY THYROID STIM HORMONE: CPT | Performed by: INTERNAL MEDICINE

## 2017-08-01 PROCEDURE — 94760 N-INVAS EAR/PLS OXIMETRY 1: CPT

## 2017-08-01 PROCEDURE — 94640 AIRWAY INHALATION TREATMENT: CPT

## 2017-08-01 PROCEDURE — 92526 ORAL FUNCTION THERAPY: CPT

## 2017-08-01 PROCEDURE — 82570 ASSAY OF URINE CREATININE: CPT | Performed by: INTERNAL MEDICINE

## 2017-08-01 PROCEDURE — 80048 BASIC METABOLIC PNL TOTAL CA: CPT | Performed by: NURSE PRACTITIONER

## 2017-08-01 PROCEDURE — 83935 ASSAY OF URINE OSMOLALITY: CPT | Performed by: INTERNAL MEDICINE

## 2017-08-01 PROCEDURE — 84133 ASSAY OF URINE POTASSIUM: CPT | Performed by: INTERNAL MEDICINE

## 2017-08-01 PROCEDURE — 84550 ASSAY OF BLOOD/URIC ACID: CPT | Performed by: INTERNAL MEDICINE

## 2017-08-01 PROCEDURE — 84300 ASSAY OF URINE SODIUM: CPT | Performed by: INTERNAL MEDICINE

## 2017-08-01 PROCEDURE — 82330 ASSAY OF CALCIUM: CPT | Performed by: NURSE PRACTITIONER

## 2017-08-01 PROCEDURE — 84100 ASSAY OF PHOSPHORUS: CPT | Performed by: NURSE PRACTITIONER

## 2017-08-01 RX ORDER — ALBUTEROL SULFATE 90 UG/1
2 AEROSOL, METERED RESPIRATORY (INHALATION) EVERY 4 HOURS PRN
Status: DISCONTINUED | OUTPATIENT
Start: 2017-08-01 | End: 2017-08-05 | Stop reason: HOSPADM

## 2017-08-01 RX ORDER — SODIUM CHLORIDE 1000 MG
2 TABLET, SOLUBLE MISCELLANEOUS
Status: DISCONTINUED | OUTPATIENT
Start: 2017-08-01 | End: 2017-08-03

## 2017-08-01 RX ORDER — LEVALBUTEROL 1.25 MG/.5ML
1.25 SOLUTION, CONCENTRATE RESPIRATORY (INHALATION)
Status: DISCONTINUED | OUTPATIENT
Start: 2017-08-01 | End: 2017-08-03

## 2017-08-01 RX ADMIN — Medication 400 MG: at 08:29

## 2017-08-01 RX ADMIN — LEVALBUTEROL HYDROCHLORIDE 1.25 MG: 1.25 SOLUTION, CONCENTRATE RESPIRATORY (INHALATION) at 19:25

## 2017-08-01 RX ADMIN — SODIUM CHLORIDE TAB 1 GM 2 G: 1 TAB at 16:59

## 2017-08-01 RX ADMIN — Medication 100 MG: at 08:29

## 2017-08-01 RX ADMIN — POTASSIUM PHOSPHATE, MONOBASIC AND POTASSIUM PHOSPHATE, DIBASIC 12 MMOL: 224; 236 INJECTION, SOLUTION INTRAVENOUS at 11:00

## 2017-08-01 RX ADMIN — LEVETIRACETAM 750 MG: 750 TABLET, FILM COATED ORAL at 08:29

## 2017-08-01 RX ADMIN — POTASSIUM CHLORIDE 30 MEQ: 1500 TABLET, EXTENDED RELEASE ORAL at 13:44

## 2017-08-01 RX ADMIN — Medication 400 MG: at 20:04

## 2017-08-01 RX ADMIN — LIDOCAINE 1 PATCH: 50 PATCH CUTANEOUS at 08:29

## 2017-08-01 RX ADMIN — FOLIC ACID 1 MG: 1 TABLET ORAL at 08:29

## 2017-08-01 RX ADMIN — POTASSIUM CHLORIDE 30 MEQ: 1500 TABLET, EXTENDED RELEASE ORAL at 20:04

## 2017-08-01 RX ADMIN — LEVETIRACETAM 750 MG: 750 TABLET, FILM COATED ORAL at 20:04

## 2017-08-01 RX ADMIN — FLUOXETINE 10 MG: 10 CAPSULE ORAL at 08:32

## 2017-08-01 RX ADMIN — IPRATROPIUM BROMIDE 0.5 MG: 0.5 SOLUTION RESPIRATORY (INHALATION) at 19:25

## 2017-08-01 RX ADMIN — SODIUM CHLORIDE TAB 1 GM 2 G: 1 TAB at 13:45

## 2017-08-01 RX ADMIN — ENOXAPARIN SODIUM 40 MG: 40 INJECTION SUBCUTANEOUS at 08:29

## 2017-08-01 RX ADMIN — ACETAMINOPHEN 650 MG: 325 TABLET, FILM COATED ORAL at 20:04

## 2017-08-01 RX ADMIN — Medication 400 MG: at 16:59

## 2017-08-02 ENCOUNTER — APPOINTMENT (INPATIENT)
Dept: RADIOLOGY | Facility: HOSPITAL | Age: 52
DRG: 347 | End: 2017-08-02
Payer: COMMERCIAL

## 2017-08-02 LAB
ANION GAP SERPL CALCULATED.3IONS-SCNC: 9 MMOL/L (ref 4–13)
BASOPHILS # BLD AUTO: 0.05 THOUSANDS/ΜL (ref 0–0.1)
BASOPHILS NFR BLD AUTO: 1 % (ref 0–1)
BUN SERPL-MCNC: 5 MG/DL (ref 5–25)
CALCIUM SERPL-MCNC: 8.9 MG/DL (ref 8.3–10.1)
CHLORIDE SERPL-SCNC: 89 MMOL/L (ref 100–108)
CO2 SERPL-SCNC: 28 MMOL/L (ref 21–32)
CORTIS SERPL-MCNC: 23.2 UG/DL
CREAT SERPL-MCNC: 0.46 MG/DL (ref 0.6–1.3)
EOSINOPHIL # BLD AUTO: 0.21 THOUSAND/ΜL (ref 0–0.61)
EOSINOPHIL NFR BLD AUTO: 3 % (ref 0–6)
ERYTHROCYTE [DISTWIDTH] IN BLOOD BY AUTOMATED COUNT: 22.2 % (ref 11.6–15.1)
FERRITIN SERPL-MCNC: 38 NG/ML (ref 8–388)
FOLATE SERPL-MCNC: 15.8 NG/ML (ref 3.1–17.5)
GFR SERPL CREATININE-BSD FRML MDRD: 130 ML/MIN/1.73SQ M
GLUCOSE SERPL-MCNC: 101 MG/DL (ref 65–140)
HCT VFR BLD AUTO: 27.7 % (ref 36.5–49.3)
HGB BLD-MCNC: 8 G/DL (ref 12–17)
IRON SATN MFR SERPL: 2 %
IRON SERPL-MCNC: 9 UG/DL (ref 65–175)
LYMPHOCYTES # BLD AUTO: 1.5 THOUSANDS/ΜL (ref 0.6–4.47)
LYMPHOCYTES NFR BLD AUTO: 24 % (ref 14–44)
MAGNESIUM SERPL-MCNC: 1.5 MG/DL (ref 1.6–2.6)
MCH RBC QN AUTO: 20.5 PG (ref 26.8–34.3)
MCHC RBC AUTO-ENTMCNC: 28.9 G/DL (ref 31.4–37.4)
MCV RBC AUTO: 71 FL (ref 82–98)
MONOCYTES # BLD AUTO: 1.42 THOUSAND/ΜL (ref 0.17–1.22)
MONOCYTES NFR BLD AUTO: 23 % (ref 4–12)
NEUTROPHILS # BLD AUTO: 3.1 THOUSANDS/ΜL (ref 1.85–7.62)
NEUTS SEG NFR BLD AUTO: 49 % (ref 43–75)
NRBC BLD AUTO-RTO: 0 /100 WBCS
PHOSPHATE SERPL-MCNC: 2.3 MG/DL (ref 2.7–4.5)
PLATELET # BLD AUTO: 181 THOUSANDS/UL (ref 149–390)
PMV BLD AUTO: 9 FL (ref 8.9–12.7)
POTASSIUM SERPL-SCNC: 3.7 MMOL/L (ref 3.5–5.3)
RBC # BLD AUTO: 3.91 MILLION/UL (ref 3.88–5.62)
SODIUM SERPL-SCNC: 126 MMOL/L (ref 136–145)
TIBC SERPL-MCNC: 408 UG/DL (ref 250–450)
VIT B12 SERPL-MCNC: 548 PG/ML (ref 100–900)
WBC # BLD AUTO: 6.29 THOUSAND/UL (ref 4.31–10.16)

## 2017-08-02 PROCEDURE — 85025 COMPLETE CBC W/AUTO DIFF WBC: CPT | Performed by: FAMILY MEDICINE

## 2017-08-02 PROCEDURE — 82728 ASSAY OF FERRITIN: CPT | Performed by: FAMILY MEDICINE

## 2017-08-02 PROCEDURE — 74177 CT ABD & PELVIS W/CONTRAST: CPT

## 2017-08-02 PROCEDURE — 97535 SELF CARE MNGMENT TRAINING: CPT

## 2017-08-02 PROCEDURE — 83735 ASSAY OF MAGNESIUM: CPT | Performed by: INTERNAL MEDICINE

## 2017-08-02 PROCEDURE — 82746 ASSAY OF FOLIC ACID SERUM: CPT | Performed by: FAMILY MEDICINE

## 2017-08-02 PROCEDURE — 83540 ASSAY OF IRON: CPT | Performed by: FAMILY MEDICINE

## 2017-08-02 PROCEDURE — 84100 ASSAY OF PHOSPHORUS: CPT | Performed by: INTERNAL MEDICINE

## 2017-08-02 PROCEDURE — 80048 BASIC METABOLIC PNL TOTAL CA: CPT | Performed by: INTERNAL MEDICINE

## 2017-08-02 PROCEDURE — 83550 IRON BINDING TEST: CPT | Performed by: FAMILY MEDICINE

## 2017-08-02 PROCEDURE — 94640 AIRWAY INHALATION TREATMENT: CPT

## 2017-08-02 PROCEDURE — 82607 VITAMIN B-12: CPT | Performed by: FAMILY MEDICINE

## 2017-08-02 PROCEDURE — 94760 N-INVAS EAR/PLS OXIMETRY 1: CPT

## 2017-08-02 PROCEDURE — 82533 TOTAL CORTISOL: CPT | Performed by: INTERNAL MEDICINE

## 2017-08-02 RX ORDER — POLYETHYLENE GLYCOL 3350 17 G/17G
17 POWDER, FOR SOLUTION ORAL DAILY
Status: DISCONTINUED | OUTPATIENT
Start: 2017-08-03 | End: 2017-08-05 | Stop reason: HOSPADM

## 2017-08-02 RX ORDER — MAGNESIUM SULFATE HEPTAHYDRATE 40 MG/ML
2 INJECTION, SOLUTION INTRAVENOUS ONCE
Status: COMPLETED | OUTPATIENT
Start: 2017-08-02 | End: 2017-08-02

## 2017-08-02 RX ORDER — KETOROLAC TROMETHAMINE 30 MG/ML
30 INJECTION, SOLUTION INTRAMUSCULAR; INTRAVENOUS EVERY 6 HOURS PRN
Status: DISCONTINUED | OUTPATIENT
Start: 2017-08-02 | End: 2017-08-05 | Stop reason: HOSPADM

## 2017-08-02 RX ORDER — MAGNESIUM SULFATE 1 G/100ML
1 INJECTION INTRAVENOUS ONCE
Status: COMPLETED | OUTPATIENT
Start: 2017-08-02 | End: 2017-08-02

## 2017-08-02 RX ORDER — MAGNESIUM SULFATE HEPTAHYDRATE 40 MG/ML
2 INJECTION, SOLUTION INTRAVENOUS ONCE
Status: DISCONTINUED | OUTPATIENT
Start: 2017-08-02 | End: 2017-08-02

## 2017-08-02 RX ADMIN — SODIUM CHLORIDE TAB 1 GM 2 G: 1 TAB at 11:26

## 2017-08-02 RX ADMIN — ENOXAPARIN SODIUM 40 MG: 40 INJECTION SUBCUTANEOUS at 08:04

## 2017-08-02 RX ADMIN — SODIUM CHLORIDE TAB 1 GM 2 G: 1 TAB at 08:04

## 2017-08-02 RX ADMIN — FOLIC ACID 1 MG: 1 TABLET ORAL at 08:04

## 2017-08-02 RX ADMIN — FLUOXETINE 10 MG: 10 CAPSULE ORAL at 08:04

## 2017-08-02 RX ADMIN — LIDOCAINE 1 PATCH: 50 PATCH CUTANEOUS at 08:04

## 2017-08-02 RX ADMIN — LEVETIRACETAM 750 MG: 750 TABLET, FILM COATED ORAL at 22:46

## 2017-08-02 RX ADMIN — MAGNESIUM SULFATE HEPTAHYDRATE 2 G: 40 INJECTION, SOLUTION INTRAVENOUS at 14:07

## 2017-08-02 RX ADMIN — Medication 400 MG: at 08:04

## 2017-08-02 RX ADMIN — ACETAMINOPHEN 650 MG: 325 TABLET, FILM COATED ORAL at 11:27

## 2017-08-02 RX ADMIN — POTASSIUM PHOSPHATE, MONOBASIC AND POTASSIUM PHOSPHATE, DIBASIC 12 MMOL: 224; 236 INJECTION, SOLUTION INTRAVENOUS at 14:07

## 2017-08-02 RX ADMIN — LEVETIRACETAM 750 MG: 750 TABLET, FILM COATED ORAL at 08:04

## 2017-08-02 RX ADMIN — IPRATROPIUM BROMIDE 0.5 MG: 0.5 SOLUTION RESPIRATORY (INHALATION) at 19:25

## 2017-08-02 RX ADMIN — LEVALBUTEROL HYDROCHLORIDE 1.25 MG: 1.25 SOLUTION, CONCENTRATE RESPIRATORY (INHALATION) at 19:25

## 2017-08-02 RX ADMIN — IOHEXOL 100 ML: 350 INJECTION, SOLUTION INTRAVENOUS at 17:35

## 2017-08-02 RX ADMIN — LEVALBUTEROL HYDROCHLORIDE 1.25 MG: 1.25 SOLUTION, CONCENTRATE RESPIRATORY (INHALATION) at 07:22

## 2017-08-02 RX ADMIN — SODIUM CHLORIDE TAB 1 GM 2 G: 1 TAB at 19:16

## 2017-08-02 RX ADMIN — IPRATROPIUM BROMIDE 0.5 MG: 0.5 SOLUTION RESPIRATORY (INHALATION) at 07:22

## 2017-08-02 RX ADMIN — MAGNESIUM SULFATE HEPTAHYDRATE 1 G: 1 INJECTION, SOLUTION INTRAVENOUS at 11:27

## 2017-08-02 RX ADMIN — Medication 100 MG: at 08:04

## 2017-08-03 LAB
ALBUMIN SERPL BCP-MCNC: 2.9 G/DL (ref 3.5–5)
ALP SERPL-CCNC: 218 U/L (ref 46–116)
ALT SERPL W P-5'-P-CCNC: 57 U/L (ref 12–78)
ANION GAP SERPL CALCULATED.3IONS-SCNC: 8 MMOL/L (ref 4–13)
AST SERPL W P-5'-P-CCNC: 95 U/L (ref 5–45)
BASOPHILS # BLD AUTO: 0.06 THOUSANDS/ΜL (ref 0–0.1)
BASOPHILS NFR BLD AUTO: 1 % (ref 0–1)
BILIRUB SERPL-MCNC: 1.1 MG/DL (ref 0.2–1)
BUN SERPL-MCNC: 5 MG/DL (ref 5–25)
CALCIUM SERPL-MCNC: 8.6 MG/DL (ref 8.3–10.1)
CHLORIDE SERPL-SCNC: 88 MMOL/L (ref 100–108)
CO2 SERPL-SCNC: 29 MMOL/L (ref 21–32)
CREAT SERPL-MCNC: 0.4 MG/DL (ref 0.6–1.3)
EOSINOPHIL # BLD AUTO: 0.35 THOUSAND/ΜL (ref 0–0.61)
EOSINOPHIL NFR BLD AUTO: 4 % (ref 0–6)
ERYTHROCYTE [DISTWIDTH] IN BLOOD BY AUTOMATED COUNT: 22 % (ref 11.6–15.1)
GFR SERPL CREATININE-BSD FRML MDRD: 138 ML/MIN/1.73SQ M
GLUCOSE SERPL-MCNC: 95 MG/DL (ref 65–140)
HCT VFR BLD AUTO: 23.7 % (ref 36.5–49.3)
HCT VFR BLD AUTO: 26.3 % (ref 36.5–49.3)
HGB BLD-MCNC: 7 G/DL (ref 12–17)
HGB BLD-MCNC: 7.8 G/DL (ref 12–17)
LYMPHOCYTES # BLD AUTO: 1.35 THOUSANDS/ΜL (ref 0.6–4.47)
LYMPHOCYTES NFR BLD AUTO: 17 % (ref 14–44)
MAGNESIUM SERPL-MCNC: 1.5 MG/DL (ref 1.6–2.6)
MCH RBC QN AUTO: 20.5 PG (ref 26.8–34.3)
MCHC RBC AUTO-ENTMCNC: 29.5 G/DL (ref 31.4–37.4)
MCV RBC AUTO: 70 FL (ref 82–98)
MONOCYTES # BLD AUTO: 1.6 THOUSAND/ΜL (ref 0.17–1.22)
MONOCYTES NFR BLD AUTO: 20 % (ref 4–12)
NEUTROPHILS # BLD AUTO: 4.59 THOUSANDS/ΜL (ref 1.85–7.62)
NEUTS SEG NFR BLD AUTO: 58 % (ref 43–75)
NRBC BLD AUTO-RTO: 0 /100 WBCS
PHOSPHATE SERPL-MCNC: 2.6 MG/DL (ref 2.7–4.5)
PLATELET # BLD AUTO: 188 THOUSANDS/UL (ref 149–390)
PMV BLD AUTO: 9.2 FL (ref 8.9–12.7)
POTASSIUM SERPL-SCNC: 3.4 MMOL/L (ref 3.5–5.3)
PROT SERPL-MCNC: 7.1 G/DL (ref 6.4–8.2)
RBC # BLD AUTO: 3.41 MILLION/UL (ref 3.88–5.62)
SODIUM SERPL-SCNC: 125 MMOL/L (ref 136–145)
WBC # BLD AUTO: 7.97 THOUSAND/UL (ref 4.31–10.16)

## 2017-08-03 PROCEDURE — 80053 COMPREHEN METABOLIC PANEL: CPT | Performed by: INTERNAL MEDICINE

## 2017-08-03 PROCEDURE — 83735 ASSAY OF MAGNESIUM: CPT | Performed by: INTERNAL MEDICINE

## 2017-08-03 PROCEDURE — 84100 ASSAY OF PHOSPHORUS: CPT | Performed by: INTERNAL MEDICINE

## 2017-08-03 PROCEDURE — 85018 HEMOGLOBIN: CPT | Performed by: FAMILY MEDICINE

## 2017-08-03 PROCEDURE — 85014 HEMATOCRIT: CPT | Performed by: FAMILY MEDICINE

## 2017-08-03 PROCEDURE — 85025 COMPLETE CBC W/AUTO DIFF WBC: CPT | Performed by: INTERNAL MEDICINE

## 2017-08-03 PROCEDURE — 94640 AIRWAY INHALATION TREATMENT: CPT

## 2017-08-03 PROCEDURE — 94760 N-INVAS EAR/PLS OXIMETRY 1: CPT

## 2017-08-03 RX ORDER — SODIUM CHLORIDE 1000 MG
3 TABLET, SOLUBLE MISCELLANEOUS
Status: DISCONTINUED | OUTPATIENT
Start: 2017-08-03 | End: 2017-08-05 | Stop reason: HOSPADM

## 2017-08-03 RX ORDER — DOCUSATE SODIUM 100 MG/1
100 CAPSULE, LIQUID FILLED ORAL 2 TIMES DAILY
Status: DISCONTINUED | OUTPATIENT
Start: 2017-08-03 | End: 2017-08-05 | Stop reason: HOSPADM

## 2017-08-03 RX ORDER — LISINOPRIL 10 MG/1
10 TABLET ORAL DAILY
Status: DISCONTINUED | OUTPATIENT
Start: 2017-08-04 | End: 2017-08-05 | Stop reason: HOSPADM

## 2017-08-03 RX ORDER — POTASSIUM CHLORIDE 750 MG/1
30 TABLET, EXTENDED RELEASE ORAL EVERY 6 HOURS
Status: COMPLETED | OUTPATIENT
Start: 2017-08-03 | End: 2017-08-03

## 2017-08-03 RX ORDER — SENNOSIDES 8.6 MG
1 TABLET ORAL
Status: DISCONTINUED | OUTPATIENT
Start: 2017-08-03 | End: 2017-08-05 | Stop reason: HOSPADM

## 2017-08-03 RX ADMIN — SODIUM CHLORIDE TAB 1 GM 3 G: 1 TAB at 17:26

## 2017-08-03 RX ADMIN — POTASSIUM CHLORIDE 30 MEQ: 750 TABLET, EXTENDED RELEASE ORAL at 17:25

## 2017-08-03 RX ADMIN — Medication 100 MG: at 08:04

## 2017-08-03 RX ADMIN — LIDOCAINE 1 PATCH: 50 PATCH CUTANEOUS at 08:04

## 2017-08-03 RX ADMIN — LEVETIRACETAM 750 MG: 750 TABLET, FILM COATED ORAL at 20:03

## 2017-08-03 RX ADMIN — SODIUM CHLORIDE TAB 1 GM 2 G: 1 TAB at 08:04

## 2017-08-03 RX ADMIN — SODIUM CHLORIDE TAB 1 GM 2 G: 1 TAB at 11:06

## 2017-08-03 RX ADMIN — KETOROLAC TROMETHAMINE 30 MG: 30 INJECTION, SOLUTION INTRAMUSCULAR at 20:03

## 2017-08-03 RX ADMIN — KETOROLAC TROMETHAMINE 30 MG: 30 INJECTION, SOLUTION INTRAMUSCULAR at 13:58

## 2017-08-03 RX ADMIN — LORAZEPAM 1 MG: 2 INJECTION INTRAMUSCULAR; INTRAVENOUS at 01:33

## 2017-08-03 RX ADMIN — ACETAMINOPHEN 650 MG: 325 TABLET, FILM COATED ORAL at 23:42

## 2017-08-03 RX ADMIN — FLUOXETINE 10 MG: 10 CAPSULE ORAL at 08:04

## 2017-08-03 RX ADMIN — SODIUM PHOSPHATE, MONOBASIC, MONOHYDRATE 30 MMOL: 276; 142 INJECTION, SOLUTION INTRAVENOUS at 17:25

## 2017-08-03 RX ADMIN — IRON SUCROSE 300 MG: 20 INJECTION, SOLUTION INTRAVENOUS at 15:03

## 2017-08-03 RX ADMIN — LEVETIRACETAM 750 MG: 750 TABLET, FILM COATED ORAL at 08:04

## 2017-08-03 RX ADMIN — LORAZEPAM 1 MG: 2 INJECTION INTRAMUSCULAR; INTRAVENOUS at 23:44

## 2017-08-03 RX ADMIN — SODIUM CHLORIDE TAB 1 GM 3 G: 1 TAB at 22:41

## 2017-08-03 RX ADMIN — KETOROLAC TROMETHAMINE 30 MG: 30 INJECTION, SOLUTION INTRAMUSCULAR at 00:10

## 2017-08-03 RX ADMIN — LEVALBUTEROL HYDROCHLORIDE 1.25 MG: 1.25 SOLUTION, CONCENTRATE RESPIRATORY (INHALATION) at 07:19

## 2017-08-03 RX ADMIN — IPRATROPIUM BROMIDE 0.5 MG: 0.5 SOLUTION RESPIRATORY (INHALATION) at 07:19

## 2017-08-03 RX ADMIN — ENOXAPARIN SODIUM 40 MG: 40 INJECTION SUBCUTANEOUS at 08:04

## 2017-08-03 RX ADMIN — FOLIC ACID 1 MG: 1 TABLET ORAL at 08:04

## 2017-08-03 RX ADMIN — POTASSIUM CHLORIDE 30 MEQ: 750 TABLET, EXTENDED RELEASE ORAL at 13:58

## 2017-08-03 RX ADMIN — MAGNESIUM SULFATE HEPTAHYDRATE 3 G: 500 INJECTION, SOLUTION INTRAMUSCULAR; INTRAVENOUS at 17:25

## 2017-08-03 RX ADMIN — BISACODYL 5 MG: 5 TABLET, COATED ORAL at 00:10

## 2017-08-03 RX ADMIN — POLYETHYLENE GLYCOL 3350 17 G: 17 POWDER, FOR SOLUTION ORAL at 08:04

## 2017-08-04 ENCOUNTER — APPOINTMENT (INPATIENT)
Dept: RADIOLOGY | Facility: HOSPITAL | Age: 52
DRG: 347 | End: 2017-08-04
Payer: COMMERCIAL

## 2017-08-04 LAB
ANION GAP SERPL CALCULATED.3IONS-SCNC: 7 MMOL/L (ref 4–13)
BUN SERPL-MCNC: 9 MG/DL (ref 5–25)
CALCIUM SERPL-MCNC: 9 MG/DL (ref 8.3–10.1)
CHLORIDE SERPL-SCNC: 92 MMOL/L (ref 100–108)
CO2 SERPL-SCNC: 29 MMOL/L (ref 21–32)
CREAT SERPL-MCNC: 0.45 MG/DL (ref 0.6–1.3)
GFR SERPL CREATININE-BSD FRML MDRD: 131 ML/MIN/1.73SQ M
GLUCOSE SERPL-MCNC: 86 MG/DL (ref 65–140)
HEMOCCULT STL QL: NEGATIVE
MAGNESIUM SERPL-MCNC: 1.5 MG/DL (ref 1.6–2.6)
PHOSPHATE SERPL-MCNC: 3.3 MG/DL (ref 2.7–4.5)
POTASSIUM SERPL-SCNC: 4.1 MMOL/L (ref 3.5–5.3)
SODIUM SERPL-SCNC: 128 MMOL/L (ref 136–145)

## 2017-08-04 PROCEDURE — 83735 ASSAY OF MAGNESIUM: CPT | Performed by: INTERNAL MEDICINE

## 2017-08-04 PROCEDURE — 76857 US EXAM PELVIC LIMITED: CPT

## 2017-08-04 PROCEDURE — 82272 OCCULT BLD FECES 1-3 TESTS: CPT | Performed by: FAMILY MEDICINE

## 2017-08-04 PROCEDURE — 97116 GAIT TRAINING THERAPY: CPT

## 2017-08-04 PROCEDURE — 97535 SELF CARE MNGMENT TRAINING: CPT

## 2017-08-04 PROCEDURE — 80048 BASIC METABOLIC PNL TOTAL CA: CPT | Performed by: INTERNAL MEDICINE

## 2017-08-04 PROCEDURE — 84100 ASSAY OF PHOSPHORUS: CPT | Performed by: INTERNAL MEDICINE

## 2017-08-04 RX ORDER — MAGNESIUM SULFATE HEPTAHYDRATE 40 MG/ML
4 INJECTION, SOLUTION INTRAVENOUS ONCE
Status: COMPLETED | OUTPATIENT
Start: 2017-08-04 | End: 2017-08-04

## 2017-08-04 RX ORDER — GABAPENTIN 100 MG/1
100 CAPSULE ORAL 2 TIMES DAILY
Status: DISCONTINUED | OUTPATIENT
Start: 2017-08-05 | End: 2017-08-05 | Stop reason: HOSPADM

## 2017-08-04 RX ORDER — GABAPENTIN 100 MG/1
100 CAPSULE ORAL 3 TIMES DAILY
Status: DISCONTINUED | OUTPATIENT
Start: 2017-08-04 | End: 2017-08-04

## 2017-08-04 RX ADMIN — KETOROLAC TROMETHAMINE 30 MG: 30 INJECTION, SOLUTION INTRAMUSCULAR at 03:46

## 2017-08-04 RX ADMIN — Medication 100 MG: at 08:01

## 2017-08-04 RX ADMIN — GABAPENTIN 100 MG: 100 CAPSULE ORAL at 21:44

## 2017-08-04 RX ADMIN — MAGNESIUM SULFATE HEPTAHYDRATE 4 G: 40 INJECTION, SOLUTION INTRAVENOUS at 19:38

## 2017-08-04 RX ADMIN — SODIUM CHLORIDE TAB 1 GM 3 G: 1 TAB at 21:44

## 2017-08-04 RX ADMIN — LEVETIRACETAM 750 MG: 750 TABLET, FILM COATED ORAL at 08:01

## 2017-08-04 RX ADMIN — FOLIC ACID 1 MG: 1 TABLET ORAL at 08:01

## 2017-08-04 RX ADMIN — FLUOXETINE 10 MG: 10 CAPSULE ORAL at 08:01

## 2017-08-04 RX ADMIN — SODIUM CHLORIDE TAB 1 GM 3 G: 1 TAB at 17:46

## 2017-08-04 RX ADMIN — KETOROLAC TROMETHAMINE 30 MG: 30 INJECTION, SOLUTION INTRAMUSCULAR at 17:45

## 2017-08-04 RX ADMIN — KETOROLAC TROMETHAMINE 30 MG: 30 INJECTION, SOLUTION INTRAMUSCULAR at 10:21

## 2017-08-04 RX ADMIN — LISINOPRIL 10 MG: 10 TABLET ORAL at 08:02

## 2017-08-04 RX ADMIN — KETOROLAC TROMETHAMINE 30 MG: 30 INJECTION, SOLUTION INTRAMUSCULAR at 23:57

## 2017-08-04 RX ADMIN — POLYETHYLENE GLYCOL 3350 17 G: 17 POWDER, FOR SOLUTION ORAL at 08:01

## 2017-08-04 RX ADMIN — ENOXAPARIN SODIUM 40 MG: 40 INJECTION SUBCUTANEOUS at 08:02

## 2017-08-04 RX ADMIN — ACETAMINOPHEN 650 MG: 325 TABLET, FILM COATED ORAL at 13:26

## 2017-08-04 RX ADMIN — LIDOCAINE 1 PATCH: 50 PATCH CUTANEOUS at 08:02

## 2017-08-04 RX ADMIN — LEVETIRACETAM 750 MG: 750 TABLET, FILM COATED ORAL at 21:44

## 2017-08-04 RX ADMIN — ACETAMINOPHEN 650 MG: 325 TABLET, FILM COATED ORAL at 21:46

## 2017-08-04 RX ADMIN — SODIUM CHLORIDE TAB 1 GM 3 G: 1 TAB at 08:01

## 2017-08-04 RX ADMIN — DOCUSATE SODIUM 100 MG: 100 CAPSULE, LIQUID FILLED ORAL at 08:01

## 2017-08-04 RX ADMIN — SODIUM CHLORIDE TAB 1 GM 3 G: 1 TAB at 12:35

## 2017-08-04 RX ADMIN — IRON SUCROSE 300 MG: 20 INJECTION, SOLUTION INTRAVENOUS at 17:45

## 2017-08-05 VITALS
HEART RATE: 86 BPM | TEMPERATURE: 98.3 F | OXYGEN SATURATION: 98 % | WEIGHT: 137.57 LBS | DIASTOLIC BLOOD PRESSURE: 91 MMHG | SYSTOLIC BLOOD PRESSURE: 147 MMHG | BODY MASS INDEX: 20.85 KG/M2 | RESPIRATION RATE: 18 BRPM | HEIGHT: 68 IN

## 2017-08-05 LAB
ANION GAP SERPL CALCULATED.3IONS-SCNC: 8 MMOL/L (ref 4–13)
BASOPHILS # BLD AUTO: 0.14 THOUSANDS/ΜL (ref 0–0.1)
BASOPHILS NFR BLD AUTO: 2 % (ref 0–1)
BUN SERPL-MCNC: 7 MG/DL (ref 5–25)
CALCIUM SERPL-MCNC: 9.3 MG/DL (ref 8.3–10.1)
CHLORIDE SERPL-SCNC: 92 MMOL/L (ref 100–108)
CO2 SERPL-SCNC: 29 MMOL/L (ref 21–32)
CREAT SERPL-MCNC: 0.41 MG/DL (ref 0.6–1.3)
EOSINOPHIL # BLD AUTO: 0.57 THOUSAND/ΜL (ref 0–0.61)
EOSINOPHIL NFR BLD AUTO: 8 % (ref 0–6)
ERYTHROCYTE [DISTWIDTH] IN BLOOD BY AUTOMATED COUNT: 22.6 % (ref 11.6–15.1)
GFR SERPL CREATININE-BSD FRML MDRD: 136 ML/MIN/1.73SQ M
GLUCOSE SERPL-MCNC: 87 MG/DL (ref 65–140)
HCT VFR BLD AUTO: 23.5 % (ref 36.5–49.3)
HGB BLD-MCNC: 7 G/DL (ref 12–17)
LYMPHOCYTES # BLD AUTO: 1.91 THOUSANDS/ΜL (ref 0.6–4.47)
LYMPHOCYTES NFR BLD AUTO: 26 % (ref 14–44)
MAGNESIUM SERPL-MCNC: 2.3 MG/DL (ref 1.6–2.6)
MCH RBC QN AUTO: 20.8 PG (ref 26.8–34.3)
MCHC RBC AUTO-ENTMCNC: 29.8 G/DL (ref 31.4–37.4)
MCV RBC AUTO: 70 FL (ref 82–98)
MONOCYTES # BLD AUTO: 1.24 THOUSAND/ΜL (ref 0.17–1.22)
MONOCYTES NFR BLD AUTO: 17 % (ref 4–12)
NEUTROPHILS # BLD AUTO: 3.56 THOUSANDS/ΜL (ref 1.85–7.62)
NEUTS SEG NFR BLD AUTO: 47 % (ref 43–75)
NRBC BLD AUTO-RTO: 0 /100 WBCS
PLATELET # BLD AUTO: 282 THOUSANDS/UL (ref 149–390)
PMV BLD AUTO: 9.1 FL (ref 8.9–12.7)
POTASSIUM SERPL-SCNC: 3.7 MMOL/L (ref 3.5–5.3)
RBC # BLD AUTO: 3.36 MILLION/UL (ref 3.88–5.62)
SODIUM SERPL-SCNC: 129 MMOL/L (ref 136–145)
WBC # BLD AUTO: 7.45 THOUSAND/UL (ref 4.31–10.16)

## 2017-08-05 PROCEDURE — 80048 BASIC METABOLIC PNL TOTAL CA: CPT | Performed by: INTERNAL MEDICINE

## 2017-08-05 PROCEDURE — 83735 ASSAY OF MAGNESIUM: CPT | Performed by: INTERNAL MEDICINE

## 2017-08-05 PROCEDURE — 85025 COMPLETE CBC W/AUTO DIFF WBC: CPT | Performed by: FAMILY MEDICINE

## 2017-08-05 RX ORDER — LANOLIN ALCOHOL/MO/W.PET/CERES
100 CREAM (GRAM) TOPICAL DAILY
Qty: 30 TABLET | Refills: 0 | Status: SHIPPED | OUTPATIENT
Start: 2017-08-05 | End: 2018-07-17 | Stop reason: SDUPTHER

## 2017-08-05 RX ORDER — FOLIC ACID 1 MG/1
1 TABLET ORAL DAILY
Qty: 30 TABLET | Refills: 0 | Status: SHIPPED | OUTPATIENT
Start: 2017-08-05 | End: 2018-07-17 | Stop reason: SDUPTHER

## 2017-08-05 RX ORDER — ALBUTEROL SULFATE 90 UG/1
2 AEROSOL, METERED RESPIRATORY (INHALATION) EVERY 4 HOURS PRN
Qty: 1 INHALER | Refills: 0 | Status: SHIPPED | OUTPATIENT
Start: 2017-08-05 | End: 2018-07-17 | Stop reason: SDUPTHER

## 2017-08-05 RX ORDER — SODIUM CHLORIDE 1000 MG
2 TABLET, SOLUBLE MISCELLANEOUS 3 TIMES DAILY
Qty: 90 TABLET | Refills: 0 | Status: SHIPPED | OUTPATIENT
Start: 2017-08-05 | End: 2018-03-26 | Stop reason: SDUPTHER

## 2017-08-05 RX ORDER — LIDOCAINE 50 MG/G
1 PATCH TOPICAL DAILY
Qty: 30 PATCH | Refills: 0 | Status: SHIPPED | OUTPATIENT
Start: 2017-08-05 | End: 2017-09-06 | Stop reason: HOSPADM

## 2017-08-05 RX ORDER — GABAPENTIN 100 MG/1
100 CAPSULE ORAL 2 TIMES DAILY
Qty: 60 CAPSULE | Refills: 0 | Status: SHIPPED | OUTPATIENT
Start: 2017-08-05 | End: 2018-03-05 | Stop reason: SDUPTHER

## 2017-08-05 RX ORDER — LEVETIRACETAM 750 MG/1
750 TABLET ORAL EVERY 12 HOURS SCHEDULED
Qty: 60 TABLET | Refills: 0 | Status: SHIPPED | OUTPATIENT
Start: 2017-08-05 | End: 2017-10-12 | Stop reason: HOSPADM

## 2017-08-05 RX ORDER — ACETAMINOPHEN 325 MG/1
TABLET ORAL
Qty: 30 TABLET | Refills: 0 | Status: SHIPPED | OUTPATIENT
Start: 2017-08-05 | End: 2020-08-29 | Stop reason: HOSPADM

## 2017-08-05 RX ORDER — TRAMADOL HYDROCHLORIDE 50 MG/1
50 TABLET ORAL EVERY 6 HOURS PRN
Qty: 30 TABLET | Refills: 0 | Status: SHIPPED | OUTPATIENT
Start: 2017-08-05 | End: 2017-08-15

## 2017-08-05 RX ADMIN — LORAZEPAM 1 MG: 2 INJECTION INTRAMUSCULAR; INTRAVENOUS at 11:27

## 2017-08-05 RX ADMIN — POLYETHYLENE GLYCOL 3350 17 G: 17 POWDER, FOR SOLUTION ORAL at 08:32

## 2017-08-05 RX ADMIN — FLUOXETINE 10 MG: 10 CAPSULE ORAL at 08:33

## 2017-08-05 RX ADMIN — FOLIC ACID 1 MG: 1 TABLET ORAL at 08:33

## 2017-08-05 RX ADMIN — ACETAMINOPHEN 650 MG: 325 TABLET, FILM COATED ORAL at 08:34

## 2017-08-05 RX ADMIN — GABAPENTIN 100 MG: 100 CAPSULE ORAL at 08:34

## 2017-08-05 RX ADMIN — LORAZEPAM 1 MG: 2 INJECTION INTRAMUSCULAR; INTRAVENOUS at 00:01

## 2017-08-05 RX ADMIN — SODIUM CHLORIDE TAB 1 GM 3 G: 1 TAB at 08:33

## 2017-08-05 RX ADMIN — LISINOPRIL 10 MG: 10 TABLET ORAL at 08:33

## 2017-08-05 RX ADMIN — SODIUM CHLORIDE TAB 1 GM 3 G: 1 TAB at 11:27

## 2017-08-05 RX ADMIN — LIDOCAINE 1 PATCH: 50 PATCH CUTANEOUS at 08:33

## 2017-08-05 RX ADMIN — KETOROLAC TROMETHAMINE 30 MG: 30 INJECTION, SOLUTION INTRAMUSCULAR at 15:13

## 2017-08-05 RX ADMIN — DOCUSATE SODIUM 100 MG: 100 CAPSULE, LIQUID FILLED ORAL at 08:34

## 2017-08-05 RX ADMIN — ENOXAPARIN SODIUM 40 MG: 40 INJECTION SUBCUTANEOUS at 08:32

## 2017-08-05 RX ADMIN — Medication 100 MG: at 08:33

## 2017-08-05 RX ADMIN — LEVETIRACETAM 750 MG: 750 TABLET, FILM COATED ORAL at 08:33

## 2017-08-05 RX ADMIN — KETOROLAC TROMETHAMINE 30 MG: 30 INJECTION, SOLUTION INTRAMUSCULAR at 06:46

## 2017-08-12 ENCOUNTER — TRANSCRIBE ORDERS (OUTPATIENT)
Dept: LAB | Facility: MEDICAL CENTER | Age: 52
End: 2017-08-12

## 2017-08-12 ENCOUNTER — HOSPITAL ENCOUNTER (OUTPATIENT)
Dept: RADIOLOGY | Facility: HOSPITAL | Age: 52
Discharge: HOME | End: 2017-08-12
Attending: NEUROLOGICAL SURGERY
Payer: COMMERCIAL

## 2017-08-12 ENCOUNTER — APPOINTMENT (OUTPATIENT)
Dept: LAB | Facility: MEDICAL CENTER | Age: 52
End: 2017-08-12
Payer: COMMERCIAL

## 2017-08-12 ENCOUNTER — TRANSCRIBE ORDERS (OUTPATIENT)
Dept: ADMINISTRATIVE | Facility: HOSPITAL | Age: 52
End: 2017-08-12

## 2017-08-12 DIAGNOSIS — E87.1 HYPOSMOLALITY AND/OR HYPONATREMIA: Primary | ICD-10-CM

## 2017-08-12 DIAGNOSIS — S22.009A CLOSED FRACTURE OF MULTIPLE THORACIC VERTEBRAE, INITIAL ENCOUNTER (HCC): ICD-10-CM

## 2017-08-12 DIAGNOSIS — E87.1 HYPONATREMIA: ICD-10-CM

## 2017-08-12 LAB
ANION GAP SERPL CALCULATED.3IONS-SCNC: 10 MMOL/L (ref 4–13)
BUN SERPL-MCNC: 4 MG/DL (ref 5–25)
CALCIUM SERPL-MCNC: 8.9 MG/DL (ref 8.3–10.1)
CHLORIDE SERPL-SCNC: 94 MMOL/L (ref 100–108)
CO2 SERPL-SCNC: 24 MMOL/L (ref 21–32)
CREAT SERPL-MCNC: 0.46 MG/DL (ref 0.6–1.3)
GFR SERPL CREATININE-BSD FRML MDRD: 130 ML/MIN/1.73SQ M
GLUCOSE P FAST SERPL-MCNC: 95 MG/DL (ref 65–99)
POTASSIUM SERPL-SCNC: 4.3 MMOL/L (ref 3.5–5.3)
SODIUM SERPL-SCNC: 128 MMOL/L (ref 136–145)

## 2017-08-12 PROCEDURE — 80048 BASIC METABOLIC PNL TOTAL CA: CPT

## 2017-08-12 PROCEDURE — 36415 COLL VENOUS BLD VENIPUNCTURE: CPT

## 2017-08-12 PROCEDURE — 72072 X-RAY EXAM THORAC SPINE 3VWS: CPT

## 2017-08-15 ENCOUNTER — ALLSCRIPTS OFFICE VISIT (OUTPATIENT)
Dept: OTHER | Facility: OTHER | Age: 52
End: 2017-08-15

## 2017-08-18 ENCOUNTER — APPOINTMENT (OUTPATIENT)
Dept: LAB | Facility: HOSPITAL | Age: 52
End: 2017-08-18
Payer: COMMERCIAL

## 2017-08-18 ENCOUNTER — ALLSCRIPTS OFFICE VISIT (OUTPATIENT)
Dept: FAMILY MEDICINE CLINIC | Facility: CLINIC | Age: 52
End: 2017-08-18
Payer: COMMERCIAL

## 2017-08-18 DIAGNOSIS — E87.1 HYPO-OSMOLALITY AND HYPONATREMIA: ICD-10-CM

## 2017-08-18 DIAGNOSIS — D64.9 ANEMIA: ICD-10-CM

## 2017-08-18 LAB
ALBUMIN SERPL BCP-MCNC: 3.8 G/DL (ref 3.5–5)
ALP SERPL-CCNC: 353 U/L (ref 46–116)
ALT SERPL W P-5'-P-CCNC: 28 U/L (ref 12–78)
ANION GAP SERPL CALCULATED.3IONS-SCNC: 10 MMOL/L (ref 4–13)
AST SERPL W P-5'-P-CCNC: 101 U/L (ref 5–45)
BASOPHILS # BLD AUTO: 0.06 THOUSANDS/ΜL (ref 0–0.1)
BASOPHILS NFR BLD AUTO: 1 % (ref 0–1)
BILIRUB SERPL-MCNC: 0.67 MG/DL (ref 0.2–1)
BUN SERPL-MCNC: 2 MG/DL (ref 5–25)
CALCIUM SERPL-MCNC: 9.4 MG/DL (ref 8.3–10.1)
CHLORIDE SERPL-SCNC: 91 MMOL/L (ref 100–108)
CO2 SERPL-SCNC: 26 MMOL/L (ref 21–32)
CREAT SERPL-MCNC: 0.46 MG/DL (ref 0.6–1.3)
EOSINOPHIL # BLD AUTO: 0.13 THOUSAND/ΜL (ref 0–0.61)
EOSINOPHIL NFR BLD AUTO: 2 % (ref 0–6)
ERYTHROCYTE [DISTWIDTH] IN BLOOD BY AUTOMATED COUNT: 28.8 % (ref 11.6–15.1)
GFR SERPL CREATININE-BSD FRML MDRD: 130 ML/MIN/1.73SQ M
GLUCOSE P FAST SERPL-MCNC: 90 MG/DL (ref 65–99)
HCT VFR BLD AUTO: 33.7 % (ref 36.5–49.3)
HGB BLD-MCNC: 10 G/DL (ref 12–17)
IRON SERPL-MCNC: 34 UG/DL (ref 65–175)
LYMPHOCYTES # BLD AUTO: 1.71 THOUSANDS/ΜL (ref 0.6–4.47)
LYMPHOCYTES NFR BLD AUTO: 26 % (ref 14–44)
MCH RBC QN AUTO: 23.1 PG (ref 26.8–34.3)
MCHC RBC AUTO-ENTMCNC: 29.7 G/DL (ref 31.4–37.4)
MCV RBC AUTO: 78 FL (ref 82–98)
MONOCYTES # BLD AUTO: 0.76 THOUSAND/ΜL (ref 0.17–1.22)
MONOCYTES NFR BLD AUTO: 12 % (ref 4–12)
NEUTROPHILS # BLD AUTO: 3.81 THOUSANDS/ΜL (ref 1.85–7.62)
NEUTS SEG NFR BLD AUTO: 59 % (ref 43–75)
NRBC BLD AUTO-RTO: 0 /100 WBCS
PLATELET # BLD AUTO: 318 THOUSANDS/UL (ref 149–390)
PMV BLD AUTO: 9.6 FL (ref 8.9–12.7)
POTASSIUM SERPL-SCNC: 4.9 MMOL/L (ref 3.5–5.3)
PROT SERPL-MCNC: 8.8 G/DL (ref 6.4–8.2)
RBC # BLD AUTO: 4.33 MILLION/UL (ref 3.88–5.62)
SODIUM SERPL-SCNC: 127 MMOL/L (ref 136–145)
WBC # BLD AUTO: 6.48 THOUSAND/UL (ref 4.31–10.16)

## 2017-08-18 PROCEDURE — T1015 CLINIC SERVICE: HCPCS | Performed by: FAMILY MEDICINE

## 2017-08-18 PROCEDURE — 80053 COMPREHEN METABOLIC PANEL: CPT

## 2017-08-18 PROCEDURE — 83540 ASSAY OF IRON: CPT

## 2017-08-18 PROCEDURE — 36415 COLL VENOUS BLD VENIPUNCTURE: CPT

## 2017-08-18 PROCEDURE — 85025 COMPLETE CBC W/AUTO DIFF WBC: CPT

## 2017-08-24 ENCOUNTER — APPOINTMENT (OUTPATIENT)
Dept: LAB | Facility: HOSPITAL | Age: 52
End: 2017-08-24
Payer: COMMERCIAL

## 2017-08-24 DIAGNOSIS — E87.1 HYPO-OSMOLALITY AND HYPONATREMIA: ICD-10-CM

## 2017-08-24 LAB — SODIUM SERPL-SCNC: 133 MMOL/L (ref 136–145)

## 2017-08-24 PROCEDURE — 36415 COLL VENOUS BLD VENIPUNCTURE: CPT

## 2017-08-24 PROCEDURE — 84295 ASSAY OF SERUM SODIUM: CPT

## 2017-09-02 ENCOUNTER — APPOINTMENT (OUTPATIENT)
Dept: LAB | Facility: HOSPITAL | Age: 52
End: 2017-09-02
Payer: COMMERCIAL

## 2017-09-02 DIAGNOSIS — E87.1 HYPO-OSMOLALITY AND HYPONATREMIA: ICD-10-CM

## 2017-09-02 LAB
ANION GAP SERPL CALCULATED.3IONS-SCNC: 14 MMOL/L (ref 4–13)
CHLORIDE SERPL-SCNC: 99 MMOL/L (ref 100–108)
CO2 SERPL-SCNC: 25 MMOL/L (ref 21–32)
POTASSIUM SERPL-SCNC: 4 MMOL/L (ref 3.5–5.3)
SODIUM SERPL-SCNC: 138 MMOL/L (ref 136–145)

## 2017-09-02 PROCEDURE — 80051 ELECTROLYTE PANEL: CPT

## 2017-09-02 PROCEDURE — 36415 COLL VENOUS BLD VENIPUNCTURE: CPT

## 2017-09-05 ENCOUNTER — GENERIC CONVERSION - ENCOUNTER (OUTPATIENT)
Dept: OTHER | Facility: OTHER | Age: 52
End: 2017-09-05

## 2017-09-06 ENCOUNTER — APPOINTMENT (EMERGENCY)
Dept: RADIOLOGY | Facility: HOSPITAL | Age: 52
End: 2017-09-06
Payer: COMMERCIAL

## 2017-09-06 ENCOUNTER — HOSPITAL ENCOUNTER (EMERGENCY)
Facility: HOSPITAL | Age: 52
Discharge: HOME | End: 2017-09-06
Attending: EMERGENCY MEDICINE | Admitting: EMERGENCY MEDICINE
Payer: COMMERCIAL

## 2017-09-06 VITALS
WEIGHT: 135.58 LBS | BODY MASS INDEX: 20.62 KG/M2 | DIASTOLIC BLOOD PRESSURE: 75 MMHG | RESPIRATION RATE: 16 BRPM | OXYGEN SATURATION: 98 % | TEMPERATURE: 98.2 F | HEART RATE: 106 BPM | SYSTOLIC BLOOD PRESSURE: 110 MMHG

## 2017-09-06 DIAGNOSIS — S63.502A LEFT WRIST SPRAIN, INITIAL ENCOUNTER: ICD-10-CM

## 2017-09-06 DIAGNOSIS — S60.222A CONTUSION OF HAND, LEFT: Primary | ICD-10-CM

## 2017-09-06 PROCEDURE — 99283 EMERGENCY DEPT VISIT LOW MDM: CPT

## 2017-09-06 PROCEDURE — 73110 X-RAY EXAM OF WRIST: CPT

## 2017-09-06 PROCEDURE — 73130 X-RAY EXAM OF HAND: CPT

## 2017-09-06 RX ORDER — HYDROCODONE BITARTRATE AND ACETAMINOPHEN 5; 325 MG/1; MG/1
TABLET ORAL
Qty: 6 TABLET | Refills: 0 | Status: ON HOLD | OUTPATIENT
Start: 2017-09-06 | End: 2017-10-05 | Stop reason: HOSPADM

## 2017-09-06 RX ORDER — HYDROCODONE BITARTRATE AND ACETAMINOPHEN 5; 325 MG/1; MG/1
1 TABLET ORAL ONCE
Status: COMPLETED | OUTPATIENT
Start: 2017-09-06 | End: 2017-09-06

## 2017-09-06 RX ADMIN — HYDROCODONE BITARTRATE AND ACETAMINOPHEN 1 TABLET: 5; 325 TABLET ORAL at 11:54

## 2017-09-10 DIAGNOSIS — E87.1 HYPO-OSMOLALITY AND HYPONATREMIA: ICD-10-CM

## 2017-09-10 DIAGNOSIS — F10.20 UNCOMPLICATED ALCOHOL DEPENDENCE (HCC): ICD-10-CM

## 2017-09-10 DIAGNOSIS — S22.060A WEDGE COMPRESSION FRACTURE OF T7-T8 VERTEBRA, INITIAL ENCOUNTER FOR CLOSED FRACTURE (HCC): ICD-10-CM

## 2017-09-10 DIAGNOSIS — S22.050A WEDGE COMPRESSION FRACTURE OF T5-T6 VERTEBRA, INITIAL ENCOUNTER FOR CLOSED FRACTURE (HCC): ICD-10-CM

## 2017-09-21 ENCOUNTER — ALLSCRIPTS OFFICE VISIT (OUTPATIENT)
Dept: FAMILY MEDICINE CLINIC | Facility: CLINIC | Age: 52
End: 2017-09-21
Payer: COMMERCIAL

## 2017-09-21 LAB
GLUCOSE SERPL-MCNC: 93 MG/DL
GLUCOSE SERPL-MCNC: 93 MG/DL (ref 65–140)

## 2017-09-21 PROCEDURE — T1015 CLINIC SERVICE: HCPCS | Performed by: FAMILY MEDICINE

## 2017-09-21 PROCEDURE — 82948 REAGENT STRIP/BLOOD GLUCOSE: CPT

## 2017-09-22 ENCOUNTER — APPOINTMENT (OUTPATIENT)
Dept: LAB | Facility: MEDICAL CENTER | Age: 52
End: 2017-09-22
Payer: COMMERCIAL

## 2017-09-22 ENCOUNTER — TRANSCRIBE ORDERS (OUTPATIENT)
Dept: RADIOLOGY | Facility: MEDICAL CENTER | Age: 52
End: 2017-09-22

## 2017-09-22 DIAGNOSIS — F10.20 UNCOMPLICATED ALCOHOL DEPENDENCE (HCC): ICD-10-CM

## 2017-09-22 LAB
ALBUMIN SERPL BCP-MCNC: 3.4 G/DL (ref 3.5–5)
ALP SERPL-CCNC: 260 U/L (ref 46–116)
ALT SERPL W P-5'-P-CCNC: 33 U/L (ref 12–78)
ANION GAP SERPL CALCULATED.3IONS-SCNC: 8 MMOL/L (ref 4–13)
AST SERPL W P-5'-P-CCNC: 133 U/L (ref 5–45)
BASOPHILS # BLD AUTO: 0.05 THOUSANDS/ΜL (ref 0–0.1)
BASOPHILS NFR BLD AUTO: 1 % (ref 0–1)
BILIRUB SERPL-MCNC: 0.36 MG/DL (ref 0.2–1)
BUN SERPL-MCNC: 5 MG/DL (ref 5–25)
CALCIUM SERPL-MCNC: 8.4 MG/DL (ref 8.3–10.1)
CHLORIDE SERPL-SCNC: 97 MMOL/L (ref 100–108)
CO2 SERPL-SCNC: 26 MMOL/L (ref 21–32)
CREAT SERPL-MCNC: 0.52 MG/DL (ref 0.6–1.3)
EOSINOPHIL # BLD AUTO: 0.19 THOUSAND/ΜL (ref 0–0.61)
EOSINOPHIL NFR BLD AUTO: 3 % (ref 0–6)
ERYTHROCYTE [DISTWIDTH] IN BLOOD BY AUTOMATED COUNT: 24.3 % (ref 11.6–15.1)
FOLATE SERPL-MCNC: 12.7 NG/ML (ref 3.1–17.5)
GFR SERPL CREATININE-BSD FRML MDRD: 123 ML/MIN/1.73SQ M
GLUCOSE P FAST SERPL-MCNC: 82 MG/DL (ref 65–99)
HCT VFR BLD AUTO: 32.5 % (ref 36.5–49.3)
HGB BLD-MCNC: 10.6 G/DL (ref 12–17)
LYMPHOCYTES # BLD AUTO: 2.62 THOUSANDS/ΜL (ref 0.6–4.47)
LYMPHOCYTES NFR BLD AUTO: 48 % (ref 14–44)
MCH RBC QN AUTO: 25.9 PG (ref 26.8–34.3)
MCHC RBC AUTO-ENTMCNC: 32.6 G/DL (ref 31.4–37.4)
MCV RBC AUTO: 80 FL (ref 82–98)
MONOCYTES # BLD AUTO: 0.63 THOUSAND/ΜL (ref 0.17–1.22)
MONOCYTES NFR BLD AUTO: 11 % (ref 4–12)
NEUTROPHILS # BLD AUTO: 2.1 THOUSANDS/ΜL (ref 1.85–7.62)
NEUTS SEG NFR BLD AUTO: 37 % (ref 43–75)
NRBC BLD AUTO-RTO: 0 /100 WBCS
PLATELET # BLD AUTO: 169 THOUSANDS/UL (ref 149–390)
PMV BLD AUTO: 9.3 FL (ref 8.9–12.7)
POTASSIUM SERPL-SCNC: 4.1 MMOL/L (ref 3.5–5.3)
PROT SERPL-MCNC: 7.8 G/DL (ref 6.4–8.2)
RBC # BLD AUTO: 4.09 MILLION/UL (ref 3.88–5.62)
SODIUM SERPL-SCNC: 131 MMOL/L (ref 136–145)
VIT B12 SERPL-MCNC: 343 PG/ML (ref 100–900)
WBC # BLD AUTO: 5.6 THOUSAND/UL (ref 4.31–10.16)

## 2017-09-22 PROCEDURE — 80053 COMPREHEN METABOLIC PANEL: CPT

## 2017-09-22 PROCEDURE — 82607 VITAMIN B-12: CPT

## 2017-09-22 PROCEDURE — 82746 ASSAY OF FOLIC ACID SERUM: CPT

## 2017-09-22 PROCEDURE — 85025 COMPLETE CBC W/AUTO DIFF WBC: CPT

## 2017-09-22 PROCEDURE — 36415 COLL VENOUS BLD VENIPUNCTURE: CPT

## 2017-10-05 ENCOUNTER — HOSPITAL ENCOUNTER (INPATIENT)
Facility: HOSPITAL | Age: 52
LOS: 7 days | Discharge: HOME | End: 2017-10-12
Attending: EMERGENCY MEDICINE | Admitting: INTERNAL MEDICINE
Payer: COMMERCIAL

## 2017-10-05 ENCOUNTER — APPOINTMENT (EMERGENCY)
Dept: CT IMAGING | Facility: HOSPITAL | Age: 52
End: 2017-10-05
Payer: COMMERCIAL

## 2017-10-05 DIAGNOSIS — E87.1 HYPONATREMIA: ICD-10-CM

## 2017-10-05 DIAGNOSIS — R26.2 AMBULATORY DYSFUNCTION: ICD-10-CM

## 2017-10-05 DIAGNOSIS — Z86.69 HX OF SEIZURE DISORDER: Primary | ICD-10-CM

## 2017-10-05 DIAGNOSIS — R74.01 TRANSAMINITIS: ICD-10-CM

## 2017-10-05 DIAGNOSIS — R56.9 SEIZURE (HCC): ICD-10-CM

## 2017-10-05 DIAGNOSIS — F10.129 ALCOHOL INTOXICATION IN ACTIVE ALCOHOLIC (HCC): ICD-10-CM

## 2017-10-05 DIAGNOSIS — J44.9 COPD (CHRONIC OBSTRUCTIVE PULMONARY DISEASE) (HCC): ICD-10-CM

## 2017-10-05 PROBLEM — F10.20 CONTINUOUS CHRONIC ALCOHOLISM (HCC): Chronic | Status: ACTIVE | Noted: 2017-10-05

## 2017-10-05 LAB
ALBUMIN SERPL BCP-MCNC: 3.7 G/DL (ref 3.5–5)
ALP SERPL-CCNC: 258 U/L (ref 46–116)
ALT SERPL W P-5'-P-CCNC: 35 U/L (ref 12–78)
AMMONIA PLAS-SCNC: 30 UMOL/L (ref 11–35)
ANION GAP SERPL CALCULATED.3IONS-SCNC: 14 MMOL/L (ref 4–13)
APTT PPP: 34 SECONDS (ref 23–35)
AST SERPL W P-5'-P-CCNC: 128 U/L (ref 5–45)
BASOPHILS # BLD AUTO: 0.07 THOUSANDS/ΜL (ref 0–0.1)
BASOPHILS NFR BLD AUTO: 1 % (ref 0–1)
BILIRUB SERPL-MCNC: 0.3 MG/DL (ref 0.2–1)
BILIRUB UR QL STRIP: NEGATIVE
BUN SERPL-MCNC: 6 MG/DL (ref 5–25)
CALCIUM SERPL-MCNC: 8.5 MG/DL (ref 8.3–10.1)
CHLORIDE SERPL-SCNC: 93 MMOL/L (ref 100–108)
CK MB SERPL-MCNC: 2.5 % (ref 0–2.5)
CK MB SERPL-MCNC: 4.1 NG/ML (ref 0–5)
CK SERPL-CCNC: 161 U/L (ref 39–308)
CLARITY UR: CLEAR
CO2 SERPL-SCNC: 24 MMOL/L (ref 21–32)
COLOR UR: NORMAL
CREAT SERPL-MCNC: 0.52 MG/DL (ref 0.6–1.3)
EOSINOPHIL # BLD AUTO: 0.22 THOUSAND/ΜL (ref 0–0.61)
EOSINOPHIL NFR BLD AUTO: 4 % (ref 0–6)
ERYTHROCYTE [DISTWIDTH] IN BLOOD BY AUTOMATED COUNT: 22.8 % (ref 11.6–15.1)
ETHANOL SERPL-MCNC: 398 MG/DL (ref 0–3)
GFR SERPL CREATININE-BSD FRML MDRD: 123 ML/MIN/1.73SQ M
GLUCOSE SERPL-MCNC: 89 MG/DL (ref 65–140)
GLUCOSE UR STRIP-MCNC: NEGATIVE MG/DL
HCT VFR BLD AUTO: 34.2 % (ref 36.5–49.3)
HGB BLD-MCNC: 11.4 G/DL (ref 12–17)
HGB UR QL STRIP.AUTO: NEGATIVE
INR PPP: 1.04 (ref 0.86–1.16)
KETONES UR STRIP-MCNC: NEGATIVE MG/DL
LACTATE SERPL-SCNC: 1.8 MMOL/L (ref 0.5–2)
LEUKOCYTE ESTERASE UR QL STRIP: NEGATIVE
LIPASE SERPL-CCNC: 369 U/L (ref 73–393)
LYMPHOCYTES # BLD AUTO: 3.03 THOUSANDS/ΜL (ref 0.6–4.47)
LYMPHOCYTES NFR BLD AUTO: 49 % (ref 14–44)
MAGNESIUM SERPL-MCNC: 1.2 MG/DL (ref 1.6–2.6)
MCH RBC QN AUTO: 26.3 PG (ref 26.8–34.3)
MCHC RBC AUTO-ENTMCNC: 33.3 G/DL (ref 31.4–37.4)
MCV RBC AUTO: 79 FL (ref 82–98)
MONOCYTES # BLD AUTO: 0.58 THOUSAND/ΜL (ref 0.17–1.22)
MONOCYTES NFR BLD AUTO: 10 % (ref 4–12)
NEUTROPHILS # BLD AUTO: 2.22 THOUSANDS/ΜL (ref 1.85–7.62)
NEUTS SEG NFR BLD AUTO: 36 % (ref 43–75)
NITRITE UR QL STRIP: NEGATIVE
PH UR STRIP.AUTO: 6.5 [PH] (ref 4.5–8)
PLATELET # BLD AUTO: 174 THOUSANDS/UL (ref 149–390)
PMV BLD AUTO: 9.3 FL (ref 8.9–12.7)
POTASSIUM SERPL-SCNC: 3.7 MMOL/L (ref 3.5–5.3)
PROT SERPL-MCNC: 8.3 G/DL (ref 6.4–8.2)
PROT UR STRIP-MCNC: NEGATIVE MG/DL
PROTHROMBIN TIME: 13.5 SECONDS (ref 12.1–14.4)
RBC # BLD AUTO: 4.33 MILLION/UL (ref 3.88–5.62)
SODIUM SERPL-SCNC: 131 MMOL/L (ref 136–145)
SP GR UR STRIP.AUTO: <=1.005 (ref 1–1.03)
UROBILINOGEN UR QL STRIP.AUTO: 0.2 E.U./DL
WBC # BLD AUTO: 6.12 THOUSAND/UL (ref 4.31–10.16)

## 2017-10-05 PROCEDURE — 80177 DRUG SCRN QUAN LEVETIRACETAM: CPT | Performed by: EMERGENCY MEDICINE

## 2017-10-05 PROCEDURE — 96375 TX/PRO/DX INJ NEW DRUG ADDON: CPT

## 2017-10-05 PROCEDURE — 82553 CREATINE MB FRACTION: CPT | Performed by: EMERGENCY MEDICINE

## 2017-10-05 PROCEDURE — 80320 DRUG SCREEN QUANTALCOHOLS: CPT | Performed by: EMERGENCY MEDICINE

## 2017-10-05 PROCEDURE — 85025 COMPLETE CBC W/AUTO DIFF WBC: CPT | Performed by: EMERGENCY MEDICINE

## 2017-10-05 PROCEDURE — 96365 THER/PROPH/DIAG IV INF INIT: CPT

## 2017-10-05 PROCEDURE — 36415 COLL VENOUS BLD VENIPUNCTURE: CPT | Performed by: EMERGENCY MEDICINE

## 2017-10-05 PROCEDURE — 93005 ELECTROCARDIOGRAM TRACING: CPT | Performed by: EMERGENCY MEDICINE

## 2017-10-05 PROCEDURE — 83690 ASSAY OF LIPASE: CPT | Performed by: EMERGENCY MEDICINE

## 2017-10-05 PROCEDURE — 85730 THROMBOPLASTIN TIME PARTIAL: CPT | Performed by: EMERGENCY MEDICINE

## 2017-10-05 PROCEDURE — 82140 ASSAY OF AMMONIA: CPT | Performed by: EMERGENCY MEDICINE

## 2017-10-05 PROCEDURE — 80053 COMPREHEN METABOLIC PANEL: CPT | Performed by: EMERGENCY MEDICINE

## 2017-10-05 PROCEDURE — 96361 HYDRATE IV INFUSION ADD-ON: CPT

## 2017-10-05 PROCEDURE — 83605 ASSAY OF LACTIC ACID: CPT | Performed by: EMERGENCY MEDICINE

## 2017-10-05 PROCEDURE — 70450 CT HEAD/BRAIN W/O DYE: CPT

## 2017-10-05 PROCEDURE — 99285 EMERGENCY DEPT VISIT HI MDM: CPT

## 2017-10-05 PROCEDURE — 81003 URINALYSIS AUTO W/O SCOPE: CPT | Performed by: EMERGENCY MEDICINE

## 2017-10-05 PROCEDURE — 82550 ASSAY OF CK (CPK): CPT | Performed by: EMERGENCY MEDICINE

## 2017-10-05 PROCEDURE — 85610 PROTHROMBIN TIME: CPT | Performed by: EMERGENCY MEDICINE

## 2017-10-05 PROCEDURE — 83735 ASSAY OF MAGNESIUM: CPT | Performed by: EMERGENCY MEDICINE

## 2017-10-05 RX ORDER — SODIUM CHLORIDE 9 MG/ML
125 INJECTION, SOLUTION INTRAVENOUS CONTINUOUS
Status: DISCONTINUED | OUTPATIENT
Start: 2017-10-05 | End: 2017-10-05

## 2017-10-05 RX ORDER — LORAZEPAM 2 MG/ML
2 INJECTION INTRAMUSCULAR EVERY 4 HOURS PRN
Status: DISCONTINUED | OUTPATIENT
Start: 2017-10-05 | End: 2017-10-09

## 2017-10-05 RX ORDER — GABAPENTIN 300 MG/1
300 CAPSULE ORAL 3 TIMES DAILY
Status: DISCONTINUED | OUTPATIENT
Start: 2017-10-05 | End: 2017-10-12 | Stop reason: HOSPADM

## 2017-10-05 RX ORDER — ACETAMINOPHEN 325 MG/1
650 TABLET ORAL EVERY 6 HOURS PRN
Status: DISCONTINUED | OUTPATIENT
Start: 2017-10-05 | End: 2017-10-12 | Stop reason: HOSPADM

## 2017-10-05 RX ORDER — THIAMINE HYDROCHLORIDE 100 MG/ML
100 INJECTION, SOLUTION INTRAMUSCULAR; INTRAVENOUS ONCE
Status: COMPLETED | OUTPATIENT
Start: 2017-10-05 | End: 2017-10-05

## 2017-10-05 RX ORDER — MAGNESIUM SULFATE HEPTAHYDRATE 40 MG/ML
2 INJECTION, SOLUTION INTRAVENOUS ONCE
Status: COMPLETED | OUTPATIENT
Start: 2017-10-05 | End: 2017-10-07

## 2017-10-05 RX ORDER — FUROSEMIDE 20 MG/1
20 TABLET ORAL DAILY
Status: ON HOLD | COMMUNITY
End: 2017-10-05

## 2017-10-05 RX ORDER — LISINOPRIL 10 MG/1
10 TABLET ORAL DAILY
Status: DISCONTINUED | OUTPATIENT
Start: 2017-10-06 | End: 2017-10-12 | Stop reason: HOSPADM

## 2017-10-05 RX ORDER — FOLIC ACID 5 MG/ML
1 INJECTION, SOLUTION INTRAMUSCULAR; INTRAVENOUS; SUBCUTANEOUS ONCE
Status: DISCONTINUED | OUTPATIENT
Start: 2017-10-05 | End: 2017-10-05 | Stop reason: RX

## 2017-10-05 RX ORDER — DEXTROSE, SODIUM CHLORIDE, AND POTASSIUM CHLORIDE 5; .9; .15 G/100ML; G/100ML; G/100ML
75 INJECTION INTRAVENOUS CONTINUOUS
Status: DISCONTINUED | OUTPATIENT
Start: 2017-10-05 | End: 2017-10-05

## 2017-10-05 RX ORDER — HYDROCODONE BITARTRATE AND ACETAMINOPHEN 5; 325 MG/1; MG/1
1 TABLET ORAL EVERY 6 HOURS PRN
Status: DISCONTINUED | OUTPATIENT
Start: 2017-10-05 | End: 2017-10-12 | Stop reason: HOSPADM

## 2017-10-05 RX ORDER — THIAMINE HYDROCHLORIDE 100 MG/ML
100 INJECTION, SOLUTION INTRAMUSCULAR; INTRAVENOUS ONCE
Status: DISCONTINUED | OUTPATIENT
Start: 2017-10-05 | End: 2017-10-05 | Stop reason: RX

## 2017-10-05 RX ORDER — ALBUTEROL SULFATE 90 UG/1
2 AEROSOL, METERED RESPIRATORY (INHALATION) EVERY 4 HOURS PRN
Status: DISCONTINUED | OUTPATIENT
Start: 2017-10-05 | End: 2017-10-12 | Stop reason: HOSPADM

## 2017-10-05 RX ORDER — MAGNESIUM SULFATE HEPTAHYDRATE 40 MG/ML
2 INJECTION, SOLUTION INTRAVENOUS ONCE
Status: COMPLETED | OUTPATIENT
Start: 2017-10-05 | End: 2017-10-05

## 2017-10-05 RX ORDER — DEXTROSE, SODIUM CHLORIDE, AND POTASSIUM CHLORIDE 5; .9; .15 G/100ML; G/100ML; G/100ML
125 INJECTION INTRAVENOUS CONTINUOUS
Status: DISPENSED | OUTPATIENT
Start: 2017-10-05 | End: 2017-10-06

## 2017-10-05 RX ADMIN — MAGNESIUM SULFATE HEPTAHYDRATE 2 G: 40 INJECTION, SOLUTION INTRAVENOUS at 20:56

## 2017-10-05 RX ADMIN — ACETAMINOPHEN 650 MG: 325 TABLET, FILM COATED ORAL at 19:52

## 2017-10-05 RX ADMIN — LEVETIRACETAM 750 MG: 250 TABLET, FILM COATED ORAL at 21:49

## 2017-10-05 RX ADMIN — DEXTROSE, SODIUM CHLORIDE, AND POTASSIUM CHLORIDE 125 ML/HR: 5; .9; .15 INJECTION INTRAVENOUS at 20:20

## 2017-10-05 RX ADMIN — DEXTROSE, SODIUM CHLORIDE, AND POTASSIUM CHLORIDE 75 ML/HR: 5; .9; .15 INJECTION INTRAVENOUS at 19:57

## 2017-10-05 RX ADMIN — SODIUM CHLORIDE 125 ML/HR: 0.9 INJECTION, SOLUTION INTRAVENOUS at 17:31

## 2017-10-05 RX ADMIN — GABAPENTIN 300 MG: 300 CAPSULE ORAL at 20:50

## 2017-10-05 RX ADMIN — Medication 400 MG: at 19:52

## 2017-10-05 RX ADMIN — MAGNESIUM SULFATE HEPTAHYDRATE 2 G: 40 INJECTION, SOLUTION INTRAVENOUS at 18:24

## 2017-10-05 RX ADMIN — THIAMINE HYDROCHLORIDE 100 MG: 100 INJECTION, SOLUTION INTRAMUSCULAR; INTRAVENOUS at 17:32

## 2017-10-05 RX ADMIN — FOLIC ACID: 5 INJECTION, SOLUTION INTRAMUSCULAR; INTRAVENOUS; SUBCUTANEOUS at 20:22

## 2017-10-05 NOTE — H&P
[employer] units, the last of which was this afternoon  He denies any recent head trauma, fever, malaise, acute confusion, neck stiffness, vomiting, chest pain, palpitations, slurred speech, hemiparesis, dyspnea, abd pain or diarrhea  He has a healing abd laparotomy scar which he states is usually tender & distended from an incisional hernia, without any increased pain or swelling presently  He also has chronic blurry vision which is unchanged & attributed to "short sightedness" by the patient  Family brought him to the ED this afternoon after he reportedly had a seizure at home  He feels well currently without any new complaints  CT head is currently pending  Review of Systems:    A 10+ point review of systems was obtained & is as above, otherwise negative  Review of Systems    Past Medical and Surgical History:     Past Medical History:   Diagnosis Date    Alcohol abuse     Bowel obstruction     [facility]     Cardiac disease     Continuous chronic alcoholism ([facility] ) 10/5/2017    COPD (chronic obstructive pulmonary disease) (HCC)     History of shoulder surgery     Right shoulder    Hx of cervical spine surgery     Hypertension     Seizures ([facility] )        Past Surgical History:   Procedure Laterality Date    BACK SURGERY      ESOPHAGOGASTRODUODENOSCOPY N/A 11/28/2016    Procedure: ESOPHAGOGASTRODUODENOSCOPY (EGD); Surgeon: Elton Leal MD;  [facility]; Service:     LAPAROTOMY N/A 10/25/2016    Procedure: LAPAROTOMY EXPLORATORY;  Surgeon: Anika Modi MD;  [facility]: [facility];  Service:    S[facility] MOUTH SURGERY      SHOULDER SURGERY Right     SMALL INTESTINE SURGERY         Meds/Allergies:    Prior to Admission medications    Medication Sig Start Date End Date Taking?  Authorizing Provider   acetaminophen (TYLENOL) 325 mg tablet Take 2 tablets every 6 hours as needed 8/5/17   Clifford Tejada MD   albuterol (PROVENTIL HFA,VENTOLIN HFA) 90 mcg/act inhaler Inhale 2 puffs every 4 (four) hours as needed for wheezing or shortness of breath 8/5/17   Yi Robertson MD   FLUoxetine (PROzac) 10 mg capsule Take 1 capsule by mouth daily for 30 days 5/19/17 7/29/17  Thyra Lowers, DO   folic acid (FOLVITE) 1 mg tablet Take 1 tablet by mouth daily 8/5/17   Yi Robertson MD   gabapentin (NEURONTIN) 100 mg capsule Take 1 capsule by mouth 2 (two) times a day 8/5/17   Yi Robertson MD   HYDROcodone-acetaminophen [facility]) 5-325 mg per tablet 1-2 PO q 4-6 hours as needed for severe pain; contains acetaminophen 9/6/17   Usman Matos MD   levETIRAcetam (KEPPRA) 750 mg tablet Take 1 tablet by mouth every 12 (twelve) hours 8/5/17   Yi Robertson MD   lisinopril (ZESTRIL) 10 mg tablet Take 10 mg by mouth daily  Historical Provider, MD   sodium chloride 1 g tablet Take 2 tablets by mouth 3 (three) times a day 8/5/17   Yi Robertson MD   thiamine 100 MG tablet Take 1 tablet by mouth daily 8/5/17   Yi Robertson MD     I have reviewed home medications with patient personally      Allergies: No Known Allergies    Social History:     Marital Status: Single     Substance Use History:   History   Alcohol Use    4 8 oz/week    8 Cans of beer per week     History   Smoking Status    Current Every Day Smoker    Packs/day: 0 50   Smokeless Tobacco    Never Used     History   Drug Use No       Family History:    non-contributory    Physical Exam:   General: pleasant & cheerful, appears disheveled & malnourished, in no overt distress  HEENT: oral mucosa dry, edentulous, not pale, not jaundiced  Neck: supple, no JVD  Resp: clear lungs, no crackles or wheeze  Cardiovascular: S1 S2 audible, regular, precordial murmur( old per patient )  GI: soft abdomen, bowel sounds present, midline laparotomy scar with overlying slightly tender distended incisional hernia( old per patient ), no rebound or guarding  Musculoskeletal: no pedal edema or cyanosis  Skin: no petechiae or suspicious rash  Psych: appropriately oriented in all spheres, good insight  Neuro: Awake, alert, moves all extremities equally, CN 2-12 intact, essentially nonfocal      Vitals:   Blood Pressure: 128/93 (10/05/17 1700)  Pulse: 88 (10/05/17 1700)  Temperature: 98 1 °F (36 7 °C) (10/05/17 1654)  Temp Source: Temporal (10/05/17 1654)  Respirations: 19 (10/05/17 1700)  Height: 5' 7" (170 2 cm) (10/05/17 1654)  Weight - Scale: 61 2 kg (135 lb) (10/05/17 1654)  SpO2: 93 % (10/05/17 1700)      Additional Data:     Lab Results: I have personally reviewed pertinent reports  Results from last 7 days  Lab Units 10/05/17  1700   WBC Thousand/uL 6 12   HEMOGLOBIN g/dL 11 4*   HEMATOCRIT % 34 2*   PLATELETS Thousands/uL 174   NEUTROS PCT % 36*   LYMPHS PCT % 49*   MONOS PCT % 10   EOS PCT % 4       Results from last 7 days  Lab Units 10/05/17  1700   SODIUM mmol/L 131*   POTASSIUM mmol/L 3 7   CHLORIDE mmol/L 93*   CO2 mmol/L 24   BUN mg/dL 6   CREATININE mg/dL 0 52*   CALCIUM mg/dL 8 5   TOTAL PROTEIN g/dL 8 3*   BILIRUBIN TOTAL mg/dL 0 30   ALK PHOS U/L 258*   ALT U/L 35   AST U/L 128*   GLUCOSE RANDOM mg/dL 89       Results from last 7 days  Lab Units 10/05/17  1700   INR  1 04     Invalid input(s): TROIP    Imaging: I have personally reviewed pertinent reports  Xr Wrist 3+ Views Left    Result Date: 9/6/2017  Narrative: LEFT WRIST INDICATION:  Acute Trauma  Complains of left hand and wrist pain and swelling  History of prior trauma to the left hand, remote  COMPARISON: April 22, 2017 VIEWS:  PA, lateral and obliques including navicular IMAGES:  4 For the purposes of institution wide universal language the following terms will apply: (thumb=1st digit/finger, index finger=2nd digit/finger, long finger=3rd digit/finger, ring=4th digit/finger and small finger=5th digit/finger) FINDINGS: The bones are diffusely demineralized   Healing transverse fracture of the distal radial metaphysis is identified with the fracture fragments unchanged in position and alignment  Avulsion of the ulnar styloid process noted  Old healed fracture of the 5th metacarpal noted  No acute fracture or dislocation  Diffuse soft tissue swelling overlies the dorsum of the hand  No lytic or blastic lesions are seen  No radiopaque foreign body  Impression: Soft tissue swelling  Negative for acute fracture  Osteopenia  Workstation performed: [id number]     Xr Hand 3+ Views Left    Result Date: 9/6/2017  Narrative: LEFT HAND INDICATION:  Acute Trauma  Complains of left hand and wrist pain and swelling  History of prior trauma to the left hand, remote  COMPARISON: April 22, 2017 VIEWS:  PA, lateral and oblique IMAGES:  3 For the purposes of institution wide universal language the following terms will apply: (thumb=1st digit/finger, index finger=2nd digit/finger, long finger=3rd digit/finger, ring=4th digit/finger and small finger=5th digit/finger) FINDINGS: The bones are diffusely demineralized  Healing transverse fracture of the distal radial metaphysis is identified with the fracture fragments unchanged in position and alignment  Avulsion of the ulnar styloid process noted  Old healed fracture of the 5th metacarpal noted  No acute fracture or dislocation  Diffuse soft tissue swelling overlies the dorsum of the hand and proximal interphalangeal joints    No lytic or blastic lesions are seen  No radiopaque foreign body  Impression: Soft tissue swelling  Negative for acute fracture  Osteopenia  Workstation performed: [id number]       EKG, Pathology, and Other Studies Reviewed on Admission:   · EKG: sinus rhythm, PVCs    Allscripts Records Reviewed: Yes     ** Please Note: Dragon 360 Dictation voice to text software may have been used in the creation of this document

## 2017-10-05 NOTE — ED NOTES
STATED WHEN HE HAD A SEIZURE TODAY HE LOST HIS BALANCE AND HIS VISION BECAME BLURRY  HIS EXTREMITIES SHOOK A LITTLE BIT BUT NEVER LOST CONSCIOUSNESS AND WAS NOT INCONTINENT       Maureen Seth RN  10/05/17 6501

## 2017-10-05 NOTE — ED PROVIDER NOTES
History  Chief Complaint   Patient presents with    Seizure - Prior Hx Of     C/O SEIZURE "BECAUSE SODIUM LEVEL DROPS" PER PT   STATES "DRANK 1/5 CANS OF 20 OZ BEER"  Pt gives hx of having "seizure" earlier-vague on timing/sx  States  " I loose my sight " when I have these episodes   Pt voicing this happens when my sodiaum gets low  Pt denies vomiting  Has had few days diarrhea  Pt denies CP or SOB  Pt admits ETOH use with "2 beers today " -last was about 1 hr ago  Pt states he is taking his meds as prescribed  Pt denies trauma/fall involved with "seizure"  PMH ETOH abuse, Seizure hx ; Hyponatremia; COPD; HTN; Transaminase elevation    Pt presents intoxicated , alert,oriented and cooperative         History provided by:  Patient  Seizure - Prior Hx Of   Seizure activity on arrival: no    Seizure type:  Unable to specify  Preceding symptoms: vision change    Preceding symptoms: no dizziness, no euphoria, no headache, no hyperventilation, no nausea and no numbness    Initial focality:  None  Episode characteristics: no abnormal movements, no combativeness, no confusion, no disorientation, no incontinence, no tongue biting and responsive    Postictal symptoms: no confusion    Return to baseline: yes    Severity:  Unable to specify  Timing:  Unable to specify  Progression:  Resolved  Context: not cerebral palsy, not change in medication, not drug use, not fever and not flashing visual stimuli    Recent head injury:  No recent head injuries  PTA treatment:  None  History of seizures: yes        Prior to Admission Medications   Prescriptions Last Dose Informant Patient Reported? Taking?    FLUoxetine (PROzac) 10 mg capsule   No No   Sig: Take 1 capsule by mouth daily for 30 days   HYDROcodone-acetaminophen (NORCO) 5-325 mg per tablet   No No   Si-2 PO q 4-6 hours as needed for severe pain; contains acetaminophen   acetaminophen (TYLENOL) 325 mg tablet   No No   Sig: Take 2 tablets every 6 hours as needed albuterol (PROVENTIL HFA,VENTOLIN HFA) 90 mcg/act inhaler   No No   Sig: Inhale 2 puffs every 4 (four) hours as needed for wheezing or shortness of breath   folic acid (FOLVITE) 1 mg tablet   No No   Sig: Take 1 tablet by mouth daily   gabapentin (NEURONTIN) 100 mg capsule   No No   Sig: Take 1 capsule by mouth 2 (two) times a day   levETIRAcetam (KEPPRA) 750 mg tablet   No No   Sig: Take 1 tablet by mouth every 12 (twelve) hours   lisinopril (ZESTRIL) 10 mg tablet   Yes No   Sig: Take 10 mg by mouth daily  sodium chloride 1 g tablet   No No   Sig: Take 2 tablets by mouth 3 (three) times a day   thiamine 100 MG tablet   No No   Sig: Take 1 tablet by mouth daily      Facility-Administered Medications: None       Past Medical History:   Diagnosis Date    Alcohol abuse     Bowel obstruction     Bowel perforation (HCC)     Cardiac disease     [facility] disease) [facility]     History of shoulder surgery     Right shoulder    Hx of cervical spine surgery     Hypertension     [facility]        Past Surgical History:   Procedure Laterality Date    BACK SURGERY      ESOPHAGOGASTRODUODENOSCOPY N/A 11/28/2016    Procedure: ESOPHAGOGASTRODUODENOSCOPY (EGD); Surgeon: Daniel Alexander MD;  [facility]; Service:     LAPAROTOMY N/A 10/25/2016    Procedure: LAPAROTOMY EXPLORATORY;  Surgeon: Marlene Wasserman MD;  Location: [facility];  Service:    [facility] Right     SMALL INTESTINE SURGERY         History reviewed  No pertinent family history  I have reviewed and agree with the history as documented  Social History   Substance Use Topics    Smoking status: Current Every Day Smoker     Packs/day: 0 50    Smokeless tobacco: Never Used    Alcohol use 4 8 oz/week     8 Cans of beer per week        Review of Systems   Constitutional: Positive for activity change  Negative for appetite change, chills, diaphoresis and fever  HENT: Negative  Negative for congestion, drooling, facial swelling, mouth sores, sore throat, trouble swallowing and voice change  Eyes: Positive for visual disturbance  Negative for pain  Respiratory: Negative for apnea, choking, chest tightness and shortness of breath  Cardiovascular: Negative  Negative for chest pain and leg swelling  Gastrointestinal: Positive for abdominal distention (chronic) and diarrhea  Negative for abdominal pain, blood in stool and vomiting  Genitourinary: Negative  Negative for difficulty urinating, dysuria, flank pain, frequency, hematuria and testicular pain  Musculoskeletal: Negative for arthralgias, gait problem, myalgias, neck pain and neck stiffness  Skin: Negative for rash and wound  Neurological: Positive for seizures (possible)  Negative for dizziness, tremors, syncope, facial asymmetry, speech difficulty and headaches  Psychiatric/Behavioral: Negative for confusion, hallucinations and self-injury  All other systems reviewed and are negative  Physical Exam  ED Triage Vitals [10/05/17 1654]   Temperature Pulse Respirations Blood Pressure SpO2   98 1 °F (36 7 °C) 98 18 128/93 94 %      Temp Source Heart Rate Source Patient Position - Orthostatic VS BP Location FiO2 (%)   Temporal Monitor Lying Left arm --      Pain Score       --           Physical Exam   Constitutional: He is oriented to person, place, and time  He appears cachectic  He is cooperative  He has a sickly appearance  No distress  AOB  Is alert and oriented  HENT:   Head: Normocephalic and atraumatic  Mouth/Throat: Mucous membranes are dry and not cyanotic  Abnormal dentition: edentulous  No oropharyngeal exudate, posterior oropharyngeal edema or posterior oropharyngeal erythema  Eyes: Conjunctivae and EOM are normal    Neck: Full passive range of motion without pain  Neck supple  No JVD present  No tracheal tenderness present     Cardiovascular: Normal rate, regular rhythm, intact distal pulses and normal pulses  Occasional extrasystoles are present  No perf edema or calf tenderness   Pulmonary/Chest: Effort normal  No stridor  No respiratory distress  He has decreased breath sounds  Rhonchi: few scattered course BS ess cleared with cough  He has no rales  He exhibits no tenderness and no crepitus  Abdominal: Soft  Bowel sounds are normal  He exhibits distension (probbal large ventral hernia)  He exhibits no fluid wave  There is no tenderness  There is no rigidity, no guarding and no CVA tenderness  Neurological: He is alert and oriented to person, place, and time  He has normal strength and normal reflexes  He displays no tremor  No cranial nerve deficit  He displays a negative Romberg sign  He displays no seizure activity  Skin: Skin is warm and dry  No rash noted  No cyanosis  Nails show clubbing  Psychiatric: He has a normal mood and affect  His affect is not inappropriate  His speech is slurred  He is not agitated, not actively hallucinating and not combative  Thought content is not paranoid and not delusional  Cognition and memory are normal  He expresses no homicidal and no suicidal ideation  Vitals reviewed        ED Medications  Medications   sodium chloride 0 9 % infusion (125 mL/hr Intravenous New Bag 10/5/17 6302)   magnesium sulfate 2 g/50 mL IVPB (premix) 2 g (2 g Intravenous New Bag 10/5/17 0112)   thiamine (VITAMIN B1) injection 100 mg (100 mg Intravenous Given 10/5/17 3435)       Diagnostic Studies  Labs Reviewed   CBC AND DIFFERENTIAL - Abnormal        Result Value Ref Range Status    Hemoglobin 11 4 (*) 12 0 - 17 0 g/dL Final    Hematocrit 34 2 (*) 36 5 - 49 3 % Final    MCV 79 (*) 82 - 98 fL Final    MCH 26 3 (*) 26 8 - 34 3 pg Final    RDW 22 8 (*) 11 6 - 15 1 % Final    Neutrophils Relative 36 (*) 43 - 75 % Final    Lymphocytes Relative 49 (*) 14 - 44 % Final    WBC 6 12  4 31 - 10 16 Thousand/uL Final    RBC 4 33  3 88 - 5 62 Million/uL Final    MCHC 33 3  31 4 - 37 4 g/dL Final    MPV 9 3  8 9 - 12 7 fL Final    Platelets 634  243 - 390 Thousands/uL Final    Monocytes Relative 10  4 - 12 % Final    Eosinophils Relative 4  0 - 6 % Final    Basophils Relative 1  0 - 1 % Final    Neutrophils Absolute 2 22  1 85 - 7 62 Thousands/µL Final    Lymphocytes Absolute 3 03  0 60 - 4 47 Thousands/µL Final    Monocytes Absolute 0 58  0 17 - 1 22 Thousand/µL Final    Eosinophils Absolute 0 22  0 00 - 0 61 Thousand/µL Final    Basophils Absolute 0 07  0 00 - 0 10 Thousands/µL Final   COMPREHENSIVE METABOLIC PANEL - Abnormal     Sodium 131 (*) 136 - 145 mmol/L Final    Chloride 93 (*) 100 - 108 mmol/L Final    Anion Gap 14 (*) 4 - 13 mmol/L Final    Creatinine 0 52 (*) 0 60 - 1 30 mg/dL Final    Comment: Standardized to IDMS reference method     (*) 5 - 45 U/L Final    Comment:   Specimen collection should occur prior to Sulfasalazine administration due to the potential for falsely depressed results  Alkaline Phosphatase 258 (*) 46 - 116 U/L Final    Total Protein 8 3 (*) 6 4 - 8 2 g/dL Final    Potassium 3 7  3 5 - 5 3 mmol/L Final    CO2 24  21 - 32 mmol/L Final    BUN 6  5 - 25 mg/dL Final    Glucose 89  65 - 140 mg/dL Final    Comment:   If the patient is fasting, the ADA then defines impaired fasting glucose as > 100 mg/dL and diabetes as > or equal to 123 mg/dL  Specimen collection should occur prior to Sulfasalazine administration due to the potential for falsely depressed results  Specimen collection should occur prior to Sulfapyridine administration due to the potential for falsely elevated results  Calcium 8 5  8 3 - 10 1 mg/dL Final    ALT 35  12 - 78 U/L Final    Comment:   Specimen collection should occur prior to Sulfasalazine administration due to the potential for falsely depressed results       Albumin 3 7  3 5 - 5 0 g/dL Final    Total Bilirubin 0 30  0 20 - 1 00 mg/dL Final    eGFR 123  ml/min/1 73sq m Final    Narrative:     National Kidney Disease Education Program recommendations are as follows:  GFR calculation is accurate only with a steady state creatinine  Chronic Kidney disease less than 60 ml/min/1 73 sq  meters  Kidney failure less than 15 ml/min/1 73 sq  meters  MAGNESIUM - Abnormal     Magnesium 1 2 (*) 1 6 - 2 6 mg/dL Final   MEDICAL ALCOHOL - Abnormal     Ethanol Lvl 398 (*) 0 - 3 mg/dL Final    Comment: 15   PROTIME-INR - Normal    Protime 13 5  12 1 - 14 4 seconds Final    INR 1 04  0 86 - 1 16 Final   APTT - Normal    PTT 34  23 - 35 seconds Final    Narrative: Therapeutic Heparin Range = 60-90 seconds   AMMONIA - Normal    Ammonia 30  11 - 35 umol/L Final    Comment:   Specimen collection should occur prior to Sulfapyridine administration due to the potential for falsely depressed results  LIPASE - Normal    Lipase 369  73 - 393 u/L Final   LACTIC ACID, PLASMA - Normal    LACTIC ACID 1 8  0 5 - 2 0 mmol/L Final    Narrative:     Result may be elevated if tourniquet was used during collection     CK - Normal    Total   39 - 308 U/L Final   CKMB - Normal    CK-MB Index 2 5  0 0 - 2 5 % Final    CK-MB FRACTION 4 1  0 0 - 5 0 ng/mL Final   UA W REFLEX TO MICROSCOPIC WITH REFLEX TO CULTURE   LEVETIRACETAM LEVEL       CT head without contrast    (Results Pending)       Procedures  ECG 12 Lead Documentation  Date/Time: 10/5/2017 5:19 PM  Performed by: Edu Mark  Authorized by: Lindsey CARRION     ECG reviewed by me, the ED Provider: yes    Patient location:  ED  Interpretation:     Interpretation: non-specific    Rate:     ECG rate assessment: normal    Rhythm:     Rhythm: sinus rhythm    Ectopy:     Ectopy: PVCs      PVCs:  Infrequent          Phone Contacts  ED Phone Contact    ED Course  ED Course as of Oct 05 1827   Thu Oct 05, 2017   1727 Hemoglobin: (!) 11 4   1727 Hematocrit: (!) 34 2   1733 INR: 1 04   1746 Magnesium: (!) 1 2   1746 Sodium: (!) 131   1746 Chloride: (!) 93   1746 LACTIC ACID: 1 8   1746 Lipase: 369   1746 Total CK: 161   1746 AST: (!) 128   1746 Ammonia: 30   1751 MEDICAL ALCOHOL: (!) 398   1801 Discussed admit with Dr Heather Chapa- danie CT of head-will admit                                [facility]  CritCare Time    Disposition  Final diagnoses:   Hx of seizure disorder   Seizure ([facility] ) - unwitnessed   Alcohol intoxication in active alcoholic ([facility] )   Hyponatremia   Transaminitis   COPD (chronic obstructive pulmonary disease) ([place] [id number] )     ED Disposition     None      Follow-up Information    None       Patient's Medications   Discharge Prescriptions    No medications on file     No discharge procedures on file      ED Provider  Electronically Signed by       Larry Perez, DO  10/05/17 6966 [facility] DO  10/05/17 5092

## 2017-10-05 NOTE — ED NOTES
Pt to be admitted  pt aware and agree to stay  SLIM admission DR in to see pt       Loy Sales RN  10/05/17 7567

## 2017-10-06 ENCOUNTER — APPOINTMENT (INPATIENT)
Dept: PHYSICAL THERAPY | Facility: HOSPITAL | Age: 52
End: 2017-10-06
Payer: COMMERCIAL

## 2017-10-06 ENCOUNTER — APPOINTMENT (INPATIENT)
Dept: OCCUPATIONAL THERAPY | Facility: HOSPITAL | Age: 52
End: 2017-10-06
Payer: COMMERCIAL

## 2017-10-06 PROBLEM — F10.239 ALCOHOL WITHDRAWAL SYNDROME (HCC): Status: ACTIVE | Noted: 2017-03-07

## 2017-10-06 PROBLEM — F10.929 ALCOHOL INTOXICATION (HCC): Status: RESOLVED | Noted: 2017-05-17 | Resolved: 2017-10-06

## 2017-10-06 PROBLEM — F10.939 ALCOHOL WITHDRAWAL SYNDROME (HCC): Status: ACTIVE | Noted: 2017-03-07

## 2017-10-06 LAB
ALBUMIN SERPL BCP-MCNC: 3.5 G/DL (ref 3.5–5)
ALP SERPL-CCNC: 235 U/L (ref 46–116)
ALT SERPL W P-5'-P-CCNC: 37 U/L (ref 12–78)
ANION GAP SERPL CALCULATED.3IONS-SCNC: 10 MMOL/L (ref 4–13)
AST SERPL W P-5'-P-CCNC: 112 U/L (ref 5–45)
BILIRUB SERPL-MCNC: 0.4 MG/DL (ref 0.2–1)
BUN SERPL-MCNC: 5 MG/DL (ref 5–25)
CALCIUM SERPL-MCNC: 8.6 MG/DL (ref 8.3–10.1)
CHLORIDE SERPL-SCNC: 99 MMOL/L (ref 100–108)
CO2 SERPL-SCNC: 26 MMOL/L (ref 21–32)
CREAT SERPL-MCNC: 0.53 MG/DL (ref 0.6–1.3)
ERYTHROCYTE [DISTWIDTH] IN BLOOD BY AUTOMATED COUNT: 22.8 % (ref 11.6–15.1)
GFR SERPL CREATININE-BSD FRML MDRD: 123 ML/MIN/1.73SQ M
GLUCOSE SERPL-MCNC: 116 MG/DL (ref 65–140)
HCT VFR BLD AUTO: 34 % (ref 36.5–49.3)
HGB BLD-MCNC: 11.1 G/DL (ref 12–17)
INR PPP: 1.03 (ref 0.86–1.16)
LACTATE SERPL-SCNC: 1 MMOL/L (ref 0.5–2)
MAGNESIUM SERPL-MCNC: 1.4 MG/DL (ref 1.6–2.6)
MAGNESIUM SERPL-MCNC: 1.6 MG/DL (ref 1.6–2.6)
MAGNESIUM SERPL-MCNC: 2 MG/DL (ref 1.6–2.6)
MCH RBC QN AUTO: 26 PG (ref 26.8–34.3)
MCHC RBC AUTO-ENTMCNC: 32.6 G/DL (ref 31.4–37.4)
MCV RBC AUTO: 80 FL (ref 82–98)
PHOSPHATE SERPL-MCNC: 2.8 MG/DL (ref 2.7–4.5)
PLATELET # BLD AUTO: 166 THOUSANDS/UL (ref 149–390)
PMV BLD AUTO: 9.2 FL (ref 8.9–12.7)
POTASSIUM SERPL-SCNC: 4.3 MMOL/L (ref 3.5–5.3)
PROT SERPL-MCNC: 8 G/DL (ref 6.4–8.2)
PROTHROMBIN TIME: 13.4 SECONDS (ref 12.1–14.4)
RBC # BLD AUTO: 4.27 MILLION/UL (ref 3.88–5.62)
SODIUM SERPL-SCNC: 135 MMOL/L (ref 136–145)
WBC # BLD AUTO: 4.96 THOUSAND/UL (ref 4.31–10.16)

## 2017-10-06 PROCEDURE — 97167 OT EVAL HIGH COMPLEX 60 MIN: CPT

## 2017-10-06 PROCEDURE — 97163 PT EVAL HIGH COMPLEX 45 MIN: CPT | Performed by: PHYSICAL THERAPIST

## 2017-10-06 PROCEDURE — 97116 GAIT TRAINING THERAPY: CPT

## 2017-10-06 PROCEDURE — 80053 COMPREHEN METABOLIC PANEL: CPT | Performed by: INTERNAL MEDICINE

## 2017-10-06 PROCEDURE — G8987 SELF CARE CURRENT STATUS: HCPCS

## 2017-10-06 PROCEDURE — G8978 MOBILITY CURRENT STATUS: HCPCS | Performed by: PHYSICAL THERAPIST

## 2017-10-06 PROCEDURE — 97535 SELF CARE MNGMENT TRAINING: CPT

## 2017-10-06 PROCEDURE — 85610 PROTHROMBIN TIME: CPT | Performed by: INTERNAL MEDICINE

## 2017-10-06 PROCEDURE — 97116 GAIT TRAINING THERAPY: CPT | Performed by: PHYSICAL THERAPIST

## 2017-10-06 PROCEDURE — G8979 MOBILITY GOAL STATUS: HCPCS | Performed by: PHYSICAL THERAPIST

## 2017-10-06 PROCEDURE — 83605 ASSAY OF LACTIC ACID: CPT | Performed by: INTERNAL MEDICINE

## 2017-10-06 PROCEDURE — 93005 ELECTROCARDIOGRAM TRACING: CPT | Performed by: INTERNAL MEDICINE

## 2017-10-06 PROCEDURE — 97530 THERAPEUTIC ACTIVITIES: CPT

## 2017-10-06 PROCEDURE — G8988 SELF CARE GOAL STATUS: HCPCS

## 2017-10-06 PROCEDURE — 84100 ASSAY OF PHOSPHORUS: CPT | Performed by: INTERNAL MEDICINE

## 2017-10-06 PROCEDURE — 83735 ASSAY OF MAGNESIUM: CPT | Performed by: INTERNAL MEDICINE

## 2017-10-06 PROCEDURE — 85027 COMPLETE CBC AUTOMATED: CPT | Performed by: INTERNAL MEDICINE

## 2017-10-06 RX ORDER — CHLORDIAZEPOXIDE HYDROCHLORIDE 25 MG/1
25 CAPSULE, GELATIN COATED ORAL EVERY 6 HOURS SCHEDULED
Status: DISCONTINUED | OUTPATIENT
Start: 2017-10-06 | End: 2017-10-09

## 2017-10-06 RX ORDER — DEXTROSE AND POTASSIUM CHLORIDE 5; .15 G/100ML; G/100ML
100 SOLUTION INTRAVENOUS CONTINUOUS
Status: DISCONTINUED | OUTPATIENT
Start: 2017-10-06 | End: 2017-10-06

## 2017-10-06 RX ORDER — FOLIC ACID 1 MG/1
1 TABLET ORAL DAILY
Status: DISCONTINUED | OUTPATIENT
Start: 2017-10-06 | End: 2017-10-07

## 2017-10-06 RX ORDER — MAGNESIUM SULFATE HEPTAHYDRATE 40 MG/ML
2 INJECTION, SOLUTION INTRAVENOUS ONCE
Status: COMPLETED | OUTPATIENT
Start: 2017-10-06 | End: 2017-10-06

## 2017-10-06 RX ORDER — DEXTROSE, SODIUM CHLORIDE, AND POTASSIUM CHLORIDE 5; .9; .15 G/100ML; G/100ML; G/100ML
100 INJECTION INTRAVENOUS CONTINUOUS
Status: ACTIVE | OUTPATIENT
Start: 2017-10-06 | End: 2017-10-07

## 2017-10-06 RX ORDER — THIAMINE MONONITRATE (VIT B1) 100 MG
100 TABLET ORAL DAILY
Status: DISCONTINUED | OUTPATIENT
Start: 2017-10-06 | End: 2017-10-07

## 2017-10-06 RX ORDER — LEVETIRACETAM 500 MG/1
1000 TABLET ORAL EVERY 12 HOURS SCHEDULED
Status: DISCONTINUED | OUTPATIENT
Start: 2017-10-06 | End: 2017-10-12 | Stop reason: HOSPADM

## 2017-10-06 RX ORDER — DEXTROSE AND POTASSIUM CHLORIDE 5; .15 G/100ML; G/100ML
50 SOLUTION INTRAVENOUS CONTINUOUS
Status: DISCONTINUED | OUTPATIENT
Start: 2017-10-06 | End: 2017-10-06

## 2017-10-06 RX ADMIN — CHLORDIAZEPOXIDE HYDROCHLORIDE 25 MG: 25 CAPSULE ORAL at 17:23

## 2017-10-06 RX ADMIN — Medication 400 MG: at 09:03

## 2017-10-06 RX ADMIN — DEXTROSE, SODIUM CHLORIDE, AND POTASSIUM CHLORIDE 100 ML/HR: 5; .9; .15 INJECTION INTRAVENOUS at 21:12

## 2017-10-06 RX ADMIN — CHLORDIAZEPOXIDE HYDROCHLORIDE 25 MG: 25 CAPSULE ORAL at 23:11

## 2017-10-06 RX ADMIN — LORAZEPAM 2 MG: 2 INJECTION INTRAMUSCULAR; INTRAVENOUS at 08:58

## 2017-10-06 RX ADMIN — GABAPENTIN 300 MG: 300 CAPSULE ORAL at 21:11

## 2017-10-06 RX ADMIN — LISINOPRIL 10 MG: 10 TABLET ORAL at 09:02

## 2017-10-06 RX ADMIN — MAGNESIUM SULFATE HEPTAHYDRATE 2 G: 40 INJECTION, SOLUTION INTRAVENOUS at 09:04

## 2017-10-06 RX ADMIN — LORAZEPAM 2 MG: 2 INJECTION INTRAMUSCULAR; INTRAVENOUS at 13:12

## 2017-10-06 RX ADMIN — LEVETIRACETAM 1000 MG: 500 TABLET, FILM COATED ORAL at 21:11

## 2017-10-06 RX ADMIN — THIAMINE HCL TAB 100 MG 100 MG: 100 TAB at 09:03

## 2017-10-06 RX ADMIN — DEXTROSE, SODIUM CHLORIDE, AND POTASSIUM CHLORIDE 125 ML/HR: 5; .9; .15 INJECTION INTRAVENOUS at 05:24

## 2017-10-06 RX ADMIN — Medication 1 TABLET: at 09:02

## 2017-10-06 RX ADMIN — GABAPENTIN 300 MG: 300 CAPSULE ORAL at 09:03

## 2017-10-06 RX ADMIN — FOLIC ACID 1 MG: 1 TABLET ORAL at 09:06

## 2017-10-06 RX ADMIN — Medication 400 MG: at 17:23

## 2017-10-06 RX ADMIN — LEVETIRACETAM 750 MG: 250 TABLET, FILM COATED ORAL at 09:02

## 2017-10-06 RX ADMIN — CHLORDIAZEPOXIDE HYDROCHLORIDE 25 MG: 25 CAPSULE ORAL at 11:55

## 2017-10-06 RX ADMIN — DEXTROSE, SODIUM CHLORIDE, AND POTASSIUM CHLORIDE 100 ML/HR: 5; .9; .15 INJECTION INTRAVENOUS at 11:56

## 2017-10-06 RX ADMIN — GABAPENTIN 300 MG: 300 CAPSULE ORAL at 15:22

## 2017-10-06 NOTE — PHYSICAL THERAPY NOTE
PT Treatment Note      10/06/17 1500   Restrictions/Precautions   Other Precautions (Seizure;Multiple lines;Telemetry; Bed Alarm; Fall Risk;Pain)   Bed Mobility   Supine to Sit 5  Supervision   Sit to Supine 5  Supervision   Transfers   Sit to Stand (CGA)   Stand to Sit (CGA)   Stand pivot 4  Minimal assistance   Additional items Verbal cues   Toilet transfer (CGA)   Additional Comments incontinent urine  Pt  able to mange clothing Independently and redress  Ambulation/Elevation   Gait pattern (Seizure;Multiple lines;Telemetry; Bed Alarm; Fall Risk;Pain)   Gait Assistance 4  Minimal assist   Additional items Assist x 1   Assistive Device Rolling walker   Distance 20' x2 for toileting   Balance   Static Sitting Good   Dynamic Sitting Good   Static Standing Fair   Dynamic Standing Fair   Ambulatory Fair   Endurance Deficit   Endurance Deficit Yes   Activity Tolerance   Activity Tolerance Patient limited by fatigue   Assessment   Prognosis Good   Problem List Decreased strength;Decreased endurance; Impaired balance;Decreased mobility; Decreased coordination   Assessment Pt  performing functional mobility at (CGA-min ) x 1 level of function  Minimal tremor this session  Improved stability with use of RW  Pt is in need of continued activity in PT to improve impairments and functional deficits  Anticipate when medically stable pt with be safe to return home but may benefit from Home health care services  Plan   Treatment/Interventions Functional transfer training;LE strengthening/ROM; Therapeutic exercise; Endurance training;Bed mobility;Gait training   Progress Progressing toward goals   Recommendation   Recommendation Home PT   Pt  In bed with call bell within reach, scd's connected and turned on and alarm on at end of PT session

## 2017-10-06 NOTE — PLAN OF CARE
DISCHARGE PLANNING     Discharge to home or other facility with appropriate resources Progressing        INFECTION - ADULT     Absence or prevention of progression during hospitalization Progressing     Absence of fever/infection during neutropenic period Progressing        Knowledge Deficit     Patient/family/caregiver demonstrates understanding of disease process, treatment plan, medications, and discharge instructions Progressing        PAIN - ADULT     Verbalizes/displays adequate comfort level or baseline comfort level Progressing        Potential for Falls     Patient will remain free of falls Progressing        SAFETY ADULT     Maintain or return to baseline ADL function Progressing     Maintain or return mobility status to optimal level Progressing

## 2017-10-06 NOTE — PLAN OF CARE
Problem: Potential for Falls  Goal: Patient will remain free of falls  INTERVENTIONS:  - Assess patient frequently for physical needs  -  Identify cognitive and physical deficits and behaviors that affect risk of falls    -  [place] fall precautions as indicated by assessment   - Educate patient/family on patient safety including physical limitations  - Instruct patient to call for assistance with activity based on assessment  - Modify environment to reduce risk of injury  - Consider OT/PT consult to assist with strengthening/mobility   Outcome: Progressing      Problem: PAIN - ADULT  Goal: Verbalizes/displays adequate comfort level or baseline comfort level  Interventions:  - Encourage patient to monitor pain and request assistance  - Assess pain using appropriate pain scale, 0-10 scale  - Administer analgesics based on type and severity of pain and evaluate response  - Implement non-pharmacological measures as appropriate and evaluate response  - Consider cultural and social influences on pain and pain management  - Notify physician/advanced practitioner if interventions unsuccessful or patient reports new pain   Outcome: Progressing      Problem: INFECTION - ADULT  Goal: Absence or prevention of progression during hospitalization  INTERVENTIONS:  - Assess and monitor for signs and symptoms of infection  - Monitor lab/diagnostic results  - Monitor all insertion sites, i e  indwelling lines, tubes, and drains  - [place] appropriate cooling/warming therapies per order  - Administer medications as ordered  - Instruct and encourage patient and family to use good hand hygiene technique  - Identify and instruct in appropriate isolation precautions for identified infection/condition   Outcome: Progressing    Goal: Absence of fever/infection during neutropenic period  INTERVENTIONS:  - Monitor WBC  - Implement neutropenic guidelines   Outcome: Progressing      Problem: SAFETY ADULT  Goal: Maintain or return to baseline ADL function  INTERVENTIONS:  -  Assess patient's ability to carry out ADLs; assess patient's baseline for ADL function and identify physical deficits which impact ability to perform ADLs (bathing, care of mouth/teeth, toileting, grooming, dressing, etc )  - Assess/evaluate cause of self-care deficits   - Assess range of motion  - Assess patient's mobility; develop plan if impaired  - Assess patient's need for assistive devices and provide as appropriate  - Encourage maximum independence but intervene and supervise when necessary  ¯ Involve family in performance of ADLs  ¯ Assess for home care needs following discharge   ¯ Request OT consult to assist with ADL evaluation and planning for discharge  ¯ Provide patient education as appropriate   Outcome: Progressing    Goal: Maintain or return mobility status to optimal level  INTERVENTIONS:  - Assess patient's baseline mobility status (ambulation, transfers, stairs, etc )    - Identify cognitive and physical deficits and behaviors that affect mobility  - Identify mobility aids required to assist with transfers and/or ambulation (gait belt, sit-to-stand, lift, walker, cane, etc )  - [place] fall precautions as indicated by assessment  - Record patient progress and toleration of activity level on Mobility SBAR; progress patient to next Phase/Stage  - Instruct patient to call for assistance with activity based on assessment  - Request Rehabilitation consult to assist with strengthening/weightbearing, etc    Outcome: Progressing      Problem: DISCHARGE PLANNING  Goal: Discharge to home or other facility with appropriate resources  INTERVENTIONS:  - Identify barriers to discharge w/patient and caregiver  - Arrange for needed discharge resources and transportation as appropriate  - Identify discharge learning needs (meds, wound care, etc )  - Arrange for interpretive services to assist at discharge as needed  - Refer to Case Management Department for coordinating discharge planning if the patient needs post-hospital services based on physician/advanced practitioner order or complex needs related to functional status, cognitive ability, or social support system   Outcome: Progressing      Problem: Knowledge Deficit  Goal: Patient/family/caregiver demonstrates understanding of disease process, treatment plan, medications, and discharge instructions  Complete learning assessment and assess knowledge base    Interventions:  - Provide teaching at level of understanding  - Provide teaching via preferred learning methods   Outcome: Progressing      Problem: SUBSTANCE USE/ABUSE  Goal: Will have no detox symptoms and will verbalize plan for changing substance-related behavior  INTERVENTIONS:  - Monitor physical status and assess for symptoms of withdrawal  - Administer medication as ordered  - Provide emotional support with 1 on 1 interaction with staff  - Encourage recovery focused program/ addiction education  - Assess for verbalization of changing behaviors related to substance abuse  - Initiate consults and referrals as appropriate (Case Management, Spiritual Care, etc )  Outcome: Progressing

## 2017-10-06 NOTE — CASE MANAGEMENT
Continued Stay Review    Date: 10/6/17    Vital Signs: /89   Pulse 82   Temp 97 8 °F (36 6 °C) (Temporal)   Resp 18   Ht 5' 5" (1 651 m)   Wt 60 kg (132 lb 4 4 oz)   SpO2 95%   BMI 22 01 kg/m²     Medications:  Scheduled Meds:  chlordiazePOXIDE 25 mg Oral K2V [address]   folic acid 1 mg Oral Daily   And      thiamine 100 mg Oral Daily   gabapentin 300 mg Oral TID   levETIRAcetam 1,000 mg Oral Q12H [address]   lisinopril 10 mg Oral Daily   magnesium oxide 400 mg Oral BID   multivitamin-minerals 1 tablet Oral Daily     IV MG RUN X1  Continuous Infusions:  dextrose 5 % and sodium chloride 0 9 % with KCl 20 mEq/L 100 mL/hr     PRN Meds:    acetaminophen    albuterol    HYDROcodone-acetaminophen    IV LORazepam; USED X1     Abnormal Labs/Diagnostic Results:   MG 1 4 HGB 11 1     Age/Sex: 47 y o  male     Assessment/Plan:   Principal Problem:    Seizure ([address] )  Active Problems:    Tobacco use    Essential hypertension    COPD (chronic obstructive pulmonary disease) (HCC)    Cholelithiasis    Hypomagnesemia    Alcohol withdrawal syndrome (HCC)    Dehydration    Malnutrition of moderate degree (HCC)    Hyponatremia    Continuous chronic alcoholism (HCC)  Plan:  · Seizure episode - keppra dose increased per teleNeurology, outpatient neurology follow-up prudent & patient verbalized understanding this, cont seizure precautions    · Severe hypomagnesemia - improving, cont aggressive IV & PO repletion  · Early alcohol withdrawal syndrome - high risk of delirium tremens & alcohol withdrawal seizures; start scheduled librium, 2mg IV ativan dose given  · Mild hyponatremia - due to hypovolemia, resolving with IV fluids  · Cholelithiasis - stable & asymptomatic     -IV ativan as needed for agitation & librium round the clock for delirium tremens prophylaxis -immediate ICU transfer if delirium tremens or clinical deterioration  -cont dextrose containing IV fluids & daily nutritional supplements lynsey thiamine     Discharge Plan: TBD

## 2017-10-06 NOTE — SOCIAL WORK
Cm met with the patient to evaluate the patients prior function and living situation and any barriers to d/c and form a safe d/c plan  Cm also evaluated the patient for any services in the home or needs for services  Pt resides at home with his nephew and great nephew in a house  1 TIFF then 13 to his 2nd floor bedroom/bathroom  Pt is independent with his adls and uses a SPC for ambulation (also has a walker and manual wc)  No services(had [facility] in the past)  PCP is Robbie Carter and pharmacy is [facility]  Plans at this time are home on dc and Cm will discuss with pt drug and alcohol services  on dc  Will continue to follow and assist in dc planning

## 2017-10-06 NOTE — PROGRESS NOTES
[facility] Internal Medicine Progress Note  Patient: Sarah Pak 47 y o  male   MRN: [id number]  PCP: Toro Zuniga PA-C  Unit/Bed#: 025-03 Encounter: [id number]  Date Of Visit: 10/06/17    Assessment:    Principal Problem:    Seizure ([facility] )  Active Problems:    Tobacco use    Essential hypertension    COPD (chronic obstructive pulmonary disease) ([facility])    Cholelithiasis    Hypomagnesemia    Alcohol withdrawal syndrome ([facility])    Dehydration    Malnutrition of moderate degree ([facility])    Hyponatremia    Continuous chronic alcoholism ([facility] )      Plan:    · Seizure episode - keppra dose increased per teleNeurology, outpatient neurology follow-up prudent & patient verbalized understanding this, cont seizure precautions  · Severe hypomagnesemia - improving, cont aggressive IV & PO repletion  · Early alcohol withdrawal syndrome - high risk of delirium tremens & alcohol withdrawal seizures; start scheduled librium, 2mg IV ativan dose given  · Mild hyponatremia - due to hypovolemia, resolving with IV fluids  · Cholelithiasis - stable & asymptomatic    -IV ativan as needed for agitation & librium round the clock for delirium tremens prophylaxis -immediate ICU transfer if delirium tremens or clinical deterioration  -cont dextrose containing IV fluids & daily nutritional supplements lynsey thiamine      VTE Pharmacologic Prophylaxis:   Pharmacologic: Patient has refused VTE prophylaxis  Mechanical VTE Prophylaxis in Place: Yes    Patient Centered Rounds: I have performed bedside rounds with nursing staff today      Current Length of Stay: 1 day(s)    Current Patient Status: Inpatient     Code Status: Level 1 - Full Code      Subjective:   No convulsive episode since admission overnight  Feels like" going through early alcohol withdrawal", "edgy & shaky"  Tolerating PO, no abd pain or nausea or acute confusion    Objective:     Vitals:   Temp (24hrs), Av 9 °F (36 6 °C), Min:97 8 °F (36 6 °C), Max:98 1 °F (36 7 °C)    HR:  [72-98] 82  Resp:  [15-19] 18  BP: (119-157)/(70-93) 157/89  SpO2:  [91 %-96 %] 95 %  Body mass index is 22 01 kg/m²  Input and Output Summary (last 24 hours): Intake/Output Summary (Last 24 hours) at 10/06/17 0955  Last data filed at 10/06/17 0845   Gross per 24 hour   Intake             1120 ml   Output             1050 ml   Net               70 ml       Physical Exam:   General: appears anxious, mildly tremulous  HEENT: oral mucosa dry, not pale, not jaundiced  Chest: clear lungs, no wheeze  Heart: S1 S2 audible, regular  Abd: soft, nontender, bowel sounds present  Psych: appropriately oriented in all spheres  Neuro: Awake, alert, asterixis +ve, essentially nonfocal      Additional Data:     Labs:      Results from last 7 days  Lab Units 10/06/17  0619 10/05/17  1700   WBC Thousand/uL 4 96 6 12   HEMOGLOBIN g/dL 11 1* 11 4*   HEMATOCRIT % 34 0* 34 2*   PLATELETS Thousands/uL 166 174   NEUTROS PCT %  --  36*   LYMPHS PCT %  --  49*   MONOS PCT %  --  10   EOS PCT %  --  4       Results from last 7 days  Lab Units 10/06/17  0619   SODIUM mmol/L 135*   POTASSIUM mmol/L 4 3   CHLORIDE mmol/L 99*   CO2 mmol/L 26   BUN mg/dL 5   CREATININE mg/dL 0 53*   CALCIUM mg/dL 8 6   TOTAL PROTEIN g/dL 8 0   BILIRUBIN TOTAL mg/dL 0 40   ALK PHOS U/L 235*   ALT U/L 37   AST U/L 112*   GLUCOSE RANDOM mg/dL 116       Results from last 7 days  Lab Units 10/06/17  0619   INR  1 03     Invalid input(s): TROIP    * I Have Reviewed All Lab Data Listed Above  * Additional Pertinent Lab Tests Reviewed:  [address] [facility] Admission Reviewed    Imaging:    Imaging Reports Reviewed Today Include: CT head      Recent Cultures (last 7 days):           Last 24 Hours Medication List:     chlordiazePOXIDE 25 mg Oral O[address]C [address]   folic acid 1 mg Oral Daily   And      thiamine 100 mg Oral Daily   gabapentin 300 mg Oral TID   levETIRAcetam 1,000 mg Oral Q12H [facility]   lisinopril 10 mg Oral Daily   magnesium oxide 400 mg Oral BID   magnesium sulfate 2 g Intravenous Once   multivitamin-minerals 1 tablet Oral Daily        Today, Patient Was Seen By: Lucila Mtz MD    ** Please Note: Dragon 360 Dictation voice to text software may have been used in the creation of this document   **

## 2017-10-06 NOTE — PLAN OF CARE
Problem: DISCHARGE PLANNING - CARE MANAGEMENT  Goal: Discharge to post-acute care or home with appropriate resources  INTERVENTIONS:  - Conduct assessment to determine patient/family and health care team treatment goals, and need for post-acute services based on payer coverage, community resources, and patient preferences, and barriers to discharge  - Address psychosocial, clinical, and financial barriers to discharge as identified in assessment in conjunction with the patient/family and health care team  - Arrange appropriate level of post-acute services according to patient's   needs and preference and payer coverage in collaboration with the physician and health care team  - Communicate with and update the patient/family, physician, and health care team regarding progress on the discharge plan  - Arrange appropriate transportation to post-acute venues  Outcome: Progressing  -possible drug and alcohol on dc if agreeable  -schedule post dc follow up appointments

## 2017-10-06 NOTE — OCCUPATIONAL THERAPY NOTE
Occupational Therapy Evaluation     Patient Name: Xochitl Souza  IMQEO'S Date: 10/6/2017  Problem List  Patient Active Problem List   Diagnosis    Alcohol abuse    Tobacco use    Essential hypertension    COPD (chronic obstructive pulmonary disease) ([address] )    H/O suicide attempt    H/O cervical spine surgery    Cholelithiasis    Hyponatremia    Anemia    Hepatomegaly    Hepatic steatosis    Hypomagnesemia    Elevated alkaline phosphatase level    Transaminitis    Vitamin D deficiency    Iron deficiency    Alcohol withdrawal syndrome (HCC)    Hypophosphatemia    Chronic abdominal wound infection    Generalized weakness    Dysarthria    Expressive aphasia    Dehydration    Body mass index (BMI) 21 0-21 9, adult    Malnutrition of moderate degree (HCC)    Closed fracture of multiple thoracic vertebrae (HCC)    Cervical strain    Weakness of left upper extremity    Hyponatremia    Hypokalemia    Syncope    Encephalopathy    Seizure ([address] )    Continuous chronic alcoholism ([address] )     Past Medical History  Past Medical History:   Diagnosis Date    Alcohol abuse     Bowel obstruction     Bowel perforation ([address] )     Cardiac disease     [facility] chronic [facility]ism [facility] 10/5/2017    COPD (chronic obstructive pulmonary disease) (HCC)     History of shoulder surgery     Right shoulder    History of transfusion     Hx of cervical spine surgery     Hypertension     MI, old     Mitral regurgitation     Psychiatric disorder     Seizures ([address] )      Past Surgical History  Past Surgical History:   Procedure Laterality Date    BACK SURGERY      ESOPHAGOGASTRODUODENOSCOPY N/A 11/28/2016    Procedure: ESOPHAGOGASTRODUODENOSCOPY (EGD); Surgeon: Gideon Arroyo MD;  Location:  GI LAB;   Service:     LAPAROTOMY N/A 10/25/2016    Procedure: LAPAROTOMY EXPLORATORY;  Surgeon: Kendra Garcia MD;  Location: [facility];  Service:    [address] Right     SMALL INTESTINE SURGERY             10/06/17 0926   Note Type   Note type Eval/Treat   Restrictions/Precautions   Weight Bearing Precautions Per Order No   Other Precautions Seizure;Multiple lines;Telemetry; Fall Risk;Pain   Pain Assessment   Pain Assessment 0-10   Pain Score 8   Pain Location Foot   Home Living   Type of [address] One level;Performs ADLs on one level; Able to live on main level with bedroom/bathroom   Bathroom Shower/Tub Tub/shower unit   Bathroom Toilet Standard   Bathroom Equipment Grab bars in shower; Shower chair   Bathroom Accessibility Accessible   Home Equipment Cane;Quad cane; Wheelchair-manual   Additional Comments pt utilizes quad cane at baseline for FM   Prior Function   Level of [place] Independent with ADLs and functional mobility   Lives With [employer] Help From Family   ADL Assistance Independent   IADLs Needs assistance   Comments family completes IADLs and drives    Psychosocial   Psychosocial (WDL) X   Patient Behaviors/Mood Flat affect   ADL   Where Assessed Edge of bed  (and bathroom)   LB Dressing Assistance 5  Supervision/Setup   LB Dressing Deficit Don/doff R shoe;Don/doff L shoe   Toileting Assistance  4  Minimal Assistance   Toileting Deficit Supervison/safety;Steadying   Additional Comments pt managed aldo buttons and zipper, however required min (A) for steadying while standing in front of toilet to void   Bed Mobility   Supine to Sit 5  Supervision   Sit to Supine 5  Supervision   Transfers   Sit to Stand 5  Supervision   Additional items (quad cane )   Stand to Sit 5  Supervision   Additional items (quad cane )   Additional Comments pt required min (A) at times to correct after LOB   Functional Mobility   Functional Mobility 4  Minimal assistance   Additional Comments X1 for safety and correction of LOB; pt had multiple LOB during session and appeared unsteady with FM    Additional items (quad cane)   Balance   Static Sitting Good   Dynamic Sitting Good   Static Standing Fair   Dynamic Standing Fair -   Ambulatory Fair -   Activity Tolerance   Activity Tolerance Patient limited by fatigue   RUE Assessment   RUE Assessment X  (4-/5 grossly; limited ROM at shoulder flexion)   LUE Assessment   LUE Assessment X  (same as R UE)   Hand Function   Gross Motor Coordination Functional   Fine Motor Coordination Functional   Sensation   Light Touch No apparent deficits   Sharp/Dull No apparent deficits   Cognition   Overall Cognitive Status WFL   Arousal/Participation Alert   Attention Within functional limits   Orientation Level Oriented X4   Memory Within functional limits   Following Commands Follows all commands and directions without difficulty   Assessment   Limitation Decreased ADL status; Decreased UE strength;Decreased endurance;Decreased self-care trans;Decreased high-level ADLs; Decreased Safe judgement during ADL   Assessment pt presents at evaluation with an overall decrease in endurance and ability to complete functional tasks such as ADLs/FM c quad cane at (I) level; pt will benefit from continued OT intervention at this time in order to maximize the same; pt will benefit from home therapy at d/c    Goals   Short Term Goal  pt will perform UE strengthening exercises    Long Term Goal #1 pt will perform UB/LB bathing and grooming tasks at (I) level   Long Term Goal #2 pt will perform FM c quad cane at mod (I) level    Long Term Goal pt will perform all functional transfers at (I) level for d/c    Plan   Treatment Interventions ADL retraining;Functional transfer training;UE strengthening/ROM; Endurance training;Patient/family training; Activityengagement   Goal Expiration Date 10/20/17   OT Frequency 3-5x/wk   Recommendation   Discharge Recommendation Home OT   OT - OK to Discharge No   Barthel Index   Feeding 10   Bathing 0   Grooming Score 0   Dressing Score 5   Bladder Score 10   Bowels Score 10   Toilet Use Score 5   Transfers (Bed/Chair) Score 10   Mobility (Level Surface) Score 10   Stairs Score 0   Barthel Index Score 60     Pt will benefit from continued OT services in order to maximize (I) c ADL performance, FM c quad cane, and improve overall endurance/strength required to complete functional tasks in preparation for d/c  Pt left supine in bed at end of session; all needs within reach; all lines intact; scds connected and turned on

## 2017-10-06 NOTE — CASE MANAGEMENT
Initial Clinical Review    Admission: Date/Time/Statement: 10/5/17 @ 1845     Orders Placed This Encounter   Procedures    Inpatient Admission     Standing Status:   Standing     Number of Occurrences:   1     Order Specific Question:   Admitting Physician     Answer:   Jr Musa [[id number]]     Order Specific Question:   Level of Care     Answer:   Med Surg [16]     Order Specific Question:   Estimated length of stay     Answer:   More than 2 Midnights     Order Specific Question:   Certification     Answer:   I certify that inpatient services are medically necessary for this patient for a duration of greater than two midnights  See H&P and MD Progress Notes for additional information about the patient's course of treatment  ED: Date/Time/Mode of Arrival:   ED Arrival Information     Expected Arrival Acuity Means of Arrival Escorted By Service Admission Type    - 10/5/2017 16:50 [facility]gent [facility] General Medicine Urgent    Arrival Complaint    -      Chief Complaint:   Chief Complaint   Patient presents with    Seizure - Prior Hx Of     C/O SEIZURE "BECAUSE SODIUM LEVEL DROPS" PER PT   STATES "DRANK 1/5 CANS OF 20 OZ BEER"  History of Illness:   Pt gives hx of having "seizure" earlier-vague on timing/sx  States  " I loose my sight " when I have these episodes   Pt voicing this happens when my sodium gets low  Pt denies vomiting  Has had few days diarrhea  Pt denies CP or SOB  Pt admits ETOH use with "2 beers today " -last was about 1 hr ago  Pt states he is taking his meds as prescribed  Pt denies trauma/fall involved with "seizure"  PMH ETOH abuse, Seizure hx ; Hyponatremia; COPD; HTN; Transaminase elevation  Pt presents intoxicated , alert,oriented and cooperative  Pulmonary/Chest:  He has decreased breath sounds  Rhonchi: few scattered course BS ess cleared with cough  Abdominal: He exhibits distension (probbal large ventral hernia)    ED Vital Signs:   ED Triage Vitals Temperature Pulse Respirations Blood Pressure SpO2   10/05/17 1654 10/05/17 1654 10/05/17 1654 10/05/17 1654 10/05/17 1654   98 1 °F (36 7 °C) 98 18 128/93 94 %      Temp Source Heart Rate Source Patient Position - Orthostatic VS BP Location FiO2 (%)   10/05/17 1654 10/05/17 1654 10/05/17 1654 10/05/17 1654 --   Temporal Monitor Lying Left arm       Pain Score       10/05/17 1948       5        Wt Readings from Last 1 Encounters:   10/06/17 60 kg (132 lb 4 4 oz)   Vital Signs (abnormal): Abnormal Labs/Diagnostic Test Results:   HGB 11 4  CL 93 ANION GAP 14 CR 0 52  ALK PHOS 258 TPROT 8 3  MG 1 2   ETOH 398  CT HEAD=No acute intracranial hemorrhage seen  No mass effect or midline shift seen  Moderate to severe mucosal thickening seen in the left maxillary sinus, was not seen on the previous study  ECG 12 Lead Documentation  Date/Time: 10/5/2017 5:19 PM  Performed by: Sloan Martin  Authorized by: Sloan Martin   ECG reviewed by me, the ED Provider: yes    Patient location:  ED  Interpretation:     Interpretation: non-specific    Rate:     ECG rate assessment: normal    Rhythm:     Rhythm: sinus rhythm    Ectopy:     Ectopy: PVCs      PVCs:  Infrequent  ED Treatment:   Medication Administration from 10/05/2017 1650 to 10/05/2017 1941       Date/Time Order Dose Route Action Action by Comments     10/05/2017 1836 sodium chloride 0 9 % infusion 0 mL/hr Intravenous Stopped Massiel Ghotra RN      10/05/2017 1731 sodium chloride 0 9 % infusion 125 mL/hr Intravenous [facility]      10/05/2017 1732 thiamine (VITAMIN B1) injection 100 mg 100 mg Intravenous Given Massiel Ghotra RN      10/05/2017 1925 magnesium sulfate 2 g/50 mL IVPB (premix) 2 g 0 g Intravenous Stopped Massiel Ghotra RN      10/05/2017 1824 magnesium sulfate 2 g/50 mL IVPB (premix) 2 g 2 g Intravenous New Bag Massiel Ghotra RN       Past Medical/Surgical History:    Active Ambulatory Problems     Diagnosis Date Noted    Alcohol abuse 05/22/2016    Tobacco use 05/22/2016    Essential hypertension 05/22/2016    COPD (chronic obstructive pulmonary disease) ([facility] ) 05/22/2016    H/O suicide attempt 05/22/2016    H/O cervical spine surgery 05/22/2016    Cholelithiasis 01/11/2017    Hyponatremia 01/11/2017    Anemia 01/11/2017    Hepatomegaly 01/12/2017    Hepatic steatosis 01/12/2017    Hypomagnesemia 01/12/2017    Elevated alkaline phosphatase level 01/14/2017    Transaminitis 03/07/2017    Vitamin D deficiency 03/07/2017    Iron deficiency 03/07/2017    Hypophosphatemia 03/09/2017    Chronic abdominal wound infection 05/17/2017    Alcohol intoxication ([facility] ) 05/17/2017    Generalized weakness 05/17/2017    Dysarthria 05/18/2017    Expressive aphasia 05/18/2017    Dehydration 05/18/2017    Body mass index (BMI) 21 0-21 9, adult 05/18/2017    Malnutrition of moderate degree ([facility] ) 05/18/2017    Closed fracture of multiple thoracic vertebrae (HCC) 07/29/2017    Cervical strain 07/29/2017    Weakness of left upper extremity 07/29/2017    Hyponatremia 07/29/2017    Hypokalemia 07/29/2017    Syncope 07/29/2017    Encephalopathy 07/29/2017     Resolved Ambulatory Problems     Diagnosis Date Noted    Chest pain 05/22/2016    Sinus tachycardia 05/22/2016    Acute gastric ulcer with perforation ([facility] [address] ) 10/25/2016    Postprocedural seroma of skin and subcutaneous tissue following other procedure 11/08/2016    Melena 11/26/2016    Intractable abdominal pain 01/11/2017    Acute pancreatitis 01/11/2017    Choledocholithiasis 01/11/2017    Hypokalemia 01/11/2017    Ventral hernia 01/11/2017    Acute cholecystitis 01/12/2017    Drop in hematocrit 01/13/2017    Common bile duct stone 03/07/2017    Gastritis 03/07/2017    Duodenitis 03/07/2017    Distended bladder 03/07/2017    Delirium tremens ([facility] [address] ) 03/07/2017    Urinary retention 03/08/2017    Right foot pain 03/08/2017    Structural abnormality of bladder 03/09/2017    Lactic acidosis 03/11/2017    Elevated total protein 03/11/2017     Past Medical History:   Diagnosis Date    Alcohol abuse     Bowel obstruction     Bowel perforation (HCC)     Cardiac disease     Continuous chronic alcoholism ([place] [address] ) 10/5/2017    COPD (chronic obstructive pulmonary disease) ([place] [address] )     History of shoulder surgery     History of transfusion     Hx of cervical spine surgery     Hypertension     MI, old     Mitral regurgitation     Psychiatric disorder     Seizures ([place] [address] )    Admitting Diagnosis: COPD (chronic obstructive pulmonary disease) (HCC) [J44 9]  Hyponatremia [E87 1]  Seizure ([place] [address] ) [R56 9]  Transaminitis [R74 0]  Alcohol intoxication in active alcoholic ([place] [address] ) [U22 838]  Hx of seizure disorder [Z86 69]  Age/Sex: 47 y o  male  Assessment/Plan:   Principal Problem:    Seizure ([place] [address] )  Active Problems:    Tobacco use    Essential hypertension    COPD (chronic obstructive pulmonary disease) ([facility])    Cholelithiasis    Hypomagnesemia    Alcohol intoxication ([place] [address] )    Dehydration    Malnutrition of moderate degree (HCC)    Hyponatremia    Continuous chronic alcoholism ([place] [address] )  Plan for the Primary Problem(s):  · Admit to telemetry if CT head unremarkable, seizure precautions, IV lorazepam PRN seizures/agitation  ? Immediate ICU transfer if delirium tremens or hemodynamic instability  Plan for Additional Problems:   · Freq neurochecks with neurology consult, daily banana bag in NS with added dextrose  · Aggressively replete serum electrolyte deficits IV, goal serum K & Mg of 2 & 4 or > respectively  · Total abstinence from [facility] & smoking cessation emphasized to the patient( declined nicotine patch )  · Monitor CBC indices, coag profile, LFTs & serum electrolytes   Anticipated Length of Stay:  Patient will be admitted on an Inpatient basis with an anticipated length of stay of  > 2 midnights     Justification for Hospital Stay: IV electrolyte repletion  Admission Orders:  TELEMETRY  PT/OT EVAL & TX  BLE VENODYNES  NEURO CHECKS Q4H  CONSULT NEURO  SEIZURE PRECAUTIONS  Scheduled Meds:   folic acid 1 mg Oral Daily   And      thiamine 100 mg Oral Daily   gabapentin 300 mg Oral TID   levETIRAcetam 750 mg Oral Q12H [facility]   lisinopril 10 mg Oral Daily   magnesium oxide 400 mg Oral BID   magnesium sulfate 2 g Intravenous Once   multivitamin-minerals 1 tablet Oral Daily     Continuous Infusions:   dextrose 5 % and sodium chloride 0 9 % with KCl 20 mEq/L 125 mL/hr Last Rate: 125 mL/hr (10/06/17 0524)   dextrose 5 % with KCl 20 mEq/L 50 mL/hr      PRN Meds:   acetaminophen    albuterol    HYDROcodone-acetaminophen    LORazepam  [facility] in the [facility] for 2017  Network Utilization Review Department  Phone: [phone]; Fax [phone]  ATTENTION: The Network Utilization Review Department is now centralized for our 7 Facilities  Make a note that we have a new phone and fax numbers for our Department  Please call with any questions or concerns to [phone] and carefully follow the prompts so that you are directed to the right person  All voicemails are confidential  Fax any determinations, approvals, denials, and requests for initial or continue stay review clinical to [phone]  Due to HIGH CALL volume, it would be easier if you could please send faxed requests to expedite your requests and in part, help us provide discharge notifications faster

## 2017-10-06 NOTE — PHYSICAL THERAPY NOTE
PT Evaluation     10/06/17 1000   Note Type   Note type Eval/Treat   Pain Assessment   Pain Score 8   Pain Location Foot;Neck   Pain Orientation Bilateral   Home Living   Type of [address] One level; Able to live on main level with bedroom/bathroom; Performs ADLs on one level   Bathroom Shower/Tub Tub/shower unit   Bathroom Toilet Standard   Bathroom Equipment Grab bars in shower; Shower chair   Bathroom Accessibility Accessible   Home Equipment Quad cane;Walker;Cane;Wheelchair-manual   Prior Function   Level of [place] Independent with ADLs and functional mobility  ((I) ambulation with quad cane)   Lives With Family  (nephew and great nephew)   Receives Help From Family   ADL Assistance Independent   IADLs Needs assistance  (pt and family perform IADL's)   Comments family drives or pt uses [employer] bus   Restrictions/Precautions   Other Precautions Seizure;Multiple lines;Telemetry; Bed Alarm; Fall Risk;Pain   General   Family/Caregiver Present No   Cognition   Overall Cognitive Status WFL   Arousal/Participation Alert   Orientation Level Oriented X4   Following Commands Follows all commands and directions without difficulty   RLE Assessment   RLE Assessment WFL  (4- to 4/5 throughout)   LLE Assessment   LLE Assessment WFL  (4- to 4/5 throughout)   Coordination   Sensation WFL   Light Touch   RLE Light Touch Grossly intact   LLE Light Touch Grossly intact   Bed Mobility   Supine to Sit 5  Supervision   Sit to Supine 5  Supervision   Additional Comments Pt with no difficulty performing bed mobility   Transfers   Sit to Stand (CGA with quad cane)   Stand to Sit (CGA with quad cane)   Stand pivot 4  Minimal assistance   Additional items Assist x 1;Verbal cues  (with quad cane)   Additional Comments pt with tremoring and shakiness in standing most notablly R UE with decreased coordination with stepping R LE   Pt with inconsistent step length gait speed occassional scissoring gait deviation and 2-3 episodes of LOB requiring min(A) for ambulation for fall prevention and balance  Ambulation/Elevation   Gait pattern Narrow MCKENZIE;Scissoring; Inconsistent feliberto  (inconsistent step length, shakiness)   Gait Assistance 4  Minimal assist   Additional items Assist x 1   Assistive Device Large base quad cane   Distance 200ft with quad cane min(A)x1 with unsteadiness shakiness and increased risk for falls  Pt with 2 epsiodes of scissoring gait deviation and decreased endurance  Balance   Static Sitting Good   Dynamic Sitting Good   Static Standing Fair  (with quad cane)   Dynamic Standing Fair -  (with quad cane)   Ambulatory Fair -  (with quad cane)   Endurance Deficit   Endurance Deficit Yes   Endurance Deficit Description limited ambulation tolerance due to fatigue and pt noting lightheadedness and demonstrating shakiness and tremoring during ambulation   Activity Tolerance   Activity Tolerance Patient limited by fatigue;Patient limited by pain   Assessment   Prognosis Good   Problem List Decreased strength;Decreased endurance; Impaired balance;Decreased mobility; Decreased coordination;Pain   Assessment Pt is a 47year old male presenting with decreased strength balance endurance mobility transfers and ambulation  Pt demonstrates tremoring and shakiness during transfers and ambulation causing unsteady gait and increased risk for falls  Pt is in need of continued activity in PT to improve impairments and functional deficits  Anticipate when medically stable pt with be safe to return home but may benefit from Home health care services  Goals   Patient Goals To feel better and return home   LTG Expiration Date 10/20/17   Long Term Goal #1 (I) with all bed mobility and transfers with quad cane   Long Term Goal #2 Pt will ambulate 300ft with quad cane (I) and will ascend/descend 4 steps with HR (S) with quad cane   Plan   Treatment/Interventions Functional transfer training;LE strengthening/ROM; Elevations; Therapeutic exercise; Endurance training;Patient/family training;Bed mobility;Gait training   PT Frequency (3-5x/wk)   Recommendation   Recommendation Home PT   PT - OK to Discharge No   pt with SCD's on when PT entered room  SCD's reapplied and turned on with pt supine in bed call bell in reach and bed alarm on

## 2017-10-07 LAB
ALBUMIN SERPL BCP-MCNC: 3.7 G/DL (ref 3.5–5)
ALP SERPL-CCNC: 233 U/L (ref 46–116)
ALT SERPL W P-5'-P-CCNC: 34 U/L (ref 12–78)
ANION GAP SERPL CALCULATED.3IONS-SCNC: 10 MMOL/L (ref 4–13)
ANION GAP SERPL CALCULATED.3IONS-SCNC: 10 MMOL/L (ref 4–13)
AST SERPL W P-5'-P-CCNC: 95 U/L (ref 5–45)
BILIRUB SERPL-MCNC: 0.7 MG/DL (ref 0.2–1)
BUN SERPL-MCNC: 5 MG/DL (ref 5–25)
BUN SERPL-MCNC: 7 MG/DL (ref 5–25)
CALCIUM SERPL-MCNC: 9 MG/DL (ref 8.3–10.1)
CALCIUM SERPL-MCNC: 9.2 MG/DL (ref 8.3–10.1)
CHLORIDE SERPL-SCNC: 89 MMOL/L (ref 100–108)
CHLORIDE SERPL-SCNC: 92 MMOL/L (ref 100–108)
CO2 SERPL-SCNC: 25 MMOL/L (ref 21–32)
CO2 SERPL-SCNC: 28 MMOL/L (ref 21–32)
CREAT SERPL-MCNC: 0.5 MG/DL (ref 0.6–1.3)
CREAT SERPL-MCNC: 0.56 MG/DL (ref 0.6–1.3)
ERYTHROCYTE [DISTWIDTH] IN BLOOD BY AUTOMATED COUNT: 22.2 % (ref 11.6–15.1)
GFR SERPL CREATININE-BSD FRML MDRD: 120 ML/MIN/1.73SQ M
GFR SERPL CREATININE-BSD FRML MDRD: 125 ML/MIN/1.73SQ M
GLUCOSE SERPL-MCNC: 103 MG/DL (ref 65–140)
GLUCOSE SERPL-MCNC: 114 MG/DL (ref 65–140)
HCT VFR BLD AUTO: 35.8 % (ref 36.5–49.3)
HGB BLD-MCNC: 11.6 G/DL (ref 12–17)
MAGNESIUM SERPL-MCNC: 1.4 MG/DL (ref 1.6–2.6)
MAGNESIUM SERPL-MCNC: 1.5 MG/DL (ref 1.6–2.6)
MCH RBC QN AUTO: 26.2 PG (ref 26.8–34.3)
MCHC RBC AUTO-ENTMCNC: 32.4 G/DL (ref 31.4–37.4)
MCV RBC AUTO: 81 FL (ref 82–98)
PHOSPHATE SERPL-MCNC: 2.9 MG/DL (ref 2.7–4.5)
PLATELET # BLD AUTO: 145 THOUSANDS/UL (ref 149–390)
PMV BLD AUTO: 9.2 FL (ref 8.9–12.7)
POTASSIUM SERPL-SCNC: 4.1 MMOL/L (ref 3.5–5.3)
POTASSIUM SERPL-SCNC: 4.4 MMOL/L (ref 3.5–5.3)
PROT SERPL-MCNC: 8.3 G/DL (ref 6.4–8.2)
RBC # BLD AUTO: 4.43 MILLION/UL (ref 3.88–5.62)
SODIUM SERPL-SCNC: 124 MMOL/L (ref 136–145)
SODIUM SERPL-SCNC: 130 MMOL/L (ref 136–145)
WBC # BLD AUTO: 6.66 THOUSAND/UL (ref 4.31–10.16)

## 2017-10-07 PROCEDURE — 80053 COMPREHEN METABOLIC PANEL: CPT | Performed by: INTERNAL MEDICINE

## 2017-10-07 PROCEDURE — 84100 ASSAY OF PHOSPHORUS: CPT | Performed by: INTERNAL MEDICINE

## 2017-10-07 PROCEDURE — 85027 COMPLETE CBC AUTOMATED: CPT | Performed by: INTERNAL MEDICINE

## 2017-10-07 PROCEDURE — 83735 ASSAY OF MAGNESIUM: CPT | Performed by: INTERNAL MEDICINE

## 2017-10-07 PROCEDURE — 80048 BASIC METABOLIC PNL TOTAL CA: CPT | Performed by: INTERNAL MEDICINE

## 2017-10-07 RX ORDER — DEXTROSE, SODIUM CHLORIDE, AND POTASSIUM CHLORIDE 5; .9; .15 G/100ML; G/100ML; G/100ML
100 INJECTION INTRAVENOUS CONTINUOUS
Status: ACTIVE | OUTPATIENT
Start: 2017-10-07 | End: 2017-10-08

## 2017-10-07 RX ORDER — MAGNESIUM SULFATE HEPTAHYDRATE 40 MG/ML
2 INJECTION, SOLUTION INTRAVENOUS ONCE
Status: COMPLETED | OUTPATIENT
Start: 2017-10-07 | End: 2017-10-07

## 2017-10-07 RX ORDER — CLONIDINE HYDROCHLORIDE 0.1 MG/1
0.1 TABLET ORAL 3 TIMES DAILY PRN
Status: DISCONTINUED | OUTPATIENT
Start: 2017-10-07 | End: 2017-10-12 | Stop reason: HOSPADM

## 2017-10-07 RX ADMIN — MAGNESIUM SULFATE HEPTAHYDRATE 2 G: 40 INJECTION, SOLUTION INTRAVENOUS at 09:07

## 2017-10-07 RX ADMIN — Medication 1 TABLET: at 08:46

## 2017-10-07 RX ADMIN — FOLIC ACID 1 MG: 1 TABLET ORAL at 08:46

## 2017-10-07 RX ADMIN — SODIUM CHLORIDE 500 ML: 0.9 INJECTION, SOLUTION INTRAVENOUS at 15:13

## 2017-10-07 RX ADMIN — LORAZEPAM 2 MG: 2 INJECTION INTRAMUSCULAR; INTRAVENOUS at 17:18

## 2017-10-07 RX ADMIN — DEXTROSE, SODIUM CHLORIDE, AND POTASSIUM CHLORIDE 100 ML/HR: 5; .9; .15 INJECTION INTRAVENOUS at 09:07

## 2017-10-07 RX ADMIN — Medication 400 MG: at 08:46

## 2017-10-07 RX ADMIN — LEVETIRACETAM 1000 MG: 500 TABLET, FILM COATED ORAL at 20:12

## 2017-10-07 RX ADMIN — THIAMINE HCL TAB 100 MG 100 MG: 100 TAB at 08:46

## 2017-10-07 RX ADMIN — Medication 400 MG: at 17:18

## 2017-10-07 RX ADMIN — CHLORDIAZEPOXIDE HYDROCHLORIDE 25 MG: 25 CAPSULE ORAL at 23:33

## 2017-10-07 RX ADMIN — CHLORDIAZEPOXIDE HYDROCHLORIDE 25 MG: 25 CAPSULE ORAL at 17:18

## 2017-10-07 RX ADMIN — GABAPENTIN 300 MG: 300 CAPSULE ORAL at 08:46

## 2017-10-07 RX ADMIN — FOLIC ACID: 5 INJECTION, SOLUTION INTRAMUSCULAR; INTRAVENOUS; SUBCUTANEOUS at 17:29

## 2017-10-07 RX ADMIN — GABAPENTIN 300 MG: 300 CAPSULE ORAL at 20:12

## 2017-10-07 RX ADMIN — LEVETIRACETAM 1000 MG: 500 TABLET, FILM COATED ORAL at 08:46

## 2017-10-07 RX ADMIN — LISINOPRIL 10 MG: 10 TABLET ORAL at 08:46

## 2017-10-07 RX ADMIN — CHLORDIAZEPOXIDE HYDROCHLORIDE 25 MG: 25 CAPSULE ORAL at 05:24

## 2017-10-07 RX ADMIN — LORAZEPAM 2 MG: 2 INJECTION INTRAMUSCULAR; INTRAVENOUS at 12:17

## 2017-10-07 RX ADMIN — CHLORDIAZEPOXIDE HYDROCHLORIDE 25 MG: 25 CAPSULE ORAL at 12:17

## 2017-10-07 RX ADMIN — GABAPENTIN 300 MG: 300 CAPSULE ORAL at 17:18

## 2017-10-07 NOTE — PROGRESS NOTES
[facility] Internal Medicine Progress Note  Patient: Raza Warner 47 y o  male   MRN: [id number]  PCP: Janiya Stack PA-C  Unit/Bed#: 840-27 Encounter: [id number]  Date Of Visit: 10/07/17    Assessment:    Principal Problem:    Alcohol withdrawal syndrome ([place] [address] )  Active Problems:    Tobacco use    Essential hypertension    COPD (chronic obstructive pulmonary disease) ([place] [address] )    Cholelithiasis    Hypomagnesemia    Dehydration    Malnutrition of moderate degree (HCC)    Hyponatremia    Seizure ([place] [address] )    Continuous chronic alcoholism ([place] [address] )      Plan:    · Now in full blown alcohol withdrawal  · Cont management with IV & PO benzodiazepine  · Cont keppra at recently increased dose  · Immediate ICU transfer if delirium tremens  · IV mag sulfate supplement  · Cont dextrose IV fluids, nutritional supplements via banana bag IV daily      VTE Pharmacologic Prophylaxis:   Pharmacologic: Patient has refused VTE prophylaxis  Mechanical VTE Prophylaxis in Place: Yes    Patient Centered Rounds: I have performed bedside rounds with nursing staff today  Current Length of Stay: 2 day(s)    Current Patient Status: Inpatient     Code Status: Level 1 - Full Code      Subjective:   "I'm shaky", reports tactile hallucinations   Episodes of tachycardia vis heart monitor  Tolerating PO    Objective:     Vitals:   Temp (24hrs), Av 7 °F (36 5 °C), Min:97 3 °F (36 3 °C), Max:98 1 °F (36 7 °C)    HR:  [] 122  Resp:  [18] 18  BP: (135-157)/(92-96) 137/96  SpO2:  [95 %-97 %] 95 %  Body mass index is 21 53 kg/m²  Input and Output Summary (last 24 hours):        Intake/Output Summary (Last 24 hours) at 10/07/17 1449  Last data filed at 10/07/17 1358   Gross per 24 hour   Intake          3416 67 ml   Output             3380 ml   Net            36 67 ml       Physical Exam:   General: tremulous but not delirious  HEENT: oral mucosa dry, not pale, not jaundiced  Chest: clear lungs, no wheeze  Heart: S1 S2 audible, tachy  Abd: soft, nontender, bowel sounds present  Psych: appropriately oriented in all spheres  Neuro: Awake, alert, asterixis +ve, essentially nonfocal      Additional Data:     Labs:      Results from last 7 days  Lab Units 10/07/17  0553  10/05/17  1700   WBC Thousand/uL 6 66  < > 6 12   HEMOGLOBIN g/dL 11 6*  < > 11 4*   HEMATOCRIT % 35 8*  < > 34 2*   PLATELETS Thousands/uL 145*  < > 174   NEUTROS PCT %  --   --  36*   LYMPHS PCT %  --   --  49*   MONOS PCT %  --   --  10   EOS PCT %  --   --  4   < > = values in this interval not displayed  Results from last 7 days  Lab Units 10/07/17  0553   SODIUM mmol/L 130*   POTASSIUM mmol/L 4 1   CHLORIDE mmol/L 92*   CO2 mmol/L 28   BUN mg/dL 5   CREATININE mg/dL 0 50*   CALCIUM mg/dL 9 2   TOTAL PROTEIN g/dL 8 3*   BILIRUBIN TOTAL mg/dL 0 70   ALK PHOS U/L 233*   ALT U/L 34   AST U/L 95*   GLUCOSE RANDOM mg/dL 114       Results from last 7 days  Lab Units 10/06/17  0619   INR  1 03     Invalid input(s): SMITHA    * I Have Reviewed All Lab Data Listed Above  * Additional Pertinent Lab Tests Reviewed: All Labs Within Last 24 Hours Reviewed        Recent Cultures (last 7 days):           Last 24 Hours Medication List:     chlordiazePOXIDE 25 mg Oral K8D [address]   folic acid 1 mg Oral Daily   And      thiamine 100 mg Oral Daily   gabapentin 300 mg Oral TID   levETIRAcetam 1,000 mg Oral Q12H [facility]   lisinopril 10 mg Oral Daily   magnesium oxide 400 mg Oral BID   multivitamin-minerals 1 tablet Oral Daily        Today, Patient Was Seen By: Yuridia Small MD    ** Please Note: Dragon 360 Dictation voice to text software may have been used in the creation of this document   **

## 2017-10-07 NOTE — PLAN OF CARE
Problem: Potential for Falls  Goal: Patient will remain free of falls  INTERVENTIONS:  - Assess patient frequently for physical needs  -  Identify cognitive and physical deficits and behaviors that affect risk of falls    -  [place] fall precautions as indicated by assessment   - Educate patient/family on patient safety including physical limitations  - Instruct patient to call for assistance with activity based on assessment  - Modify environment to reduce risk of injury  - Consider OT/PT consult to assist with strengthening/mobility   Outcome: Progressing      Problem: PAIN - ADULT  Goal: Verbalizes/displays adequate comfort level or baseline comfort level  Interventions:  - Encourage patient to monitor pain and request assistance  - Assess pain using appropriate pain scale, 0-10 scale  - Administer analgesics based on type and severity of pain and evaluate response  - Implement non-pharmacological measures as appropriate and evaluate response  - Consider cultural and social influences on pain and pain management  - Notify physician/advanced practitioner if interventions unsuccessful or patient reports new pain    Outcome: Progressing      Problem: INFECTION - ADULT  Goal: Absence or prevention of progression during hospitalization  INTERVENTIONS:  - Assess and monitor for signs and symptoms of infection  - Monitor lab/diagnostic results  - Monitor all insertion sites, i e  indwelling lines, tubes, and drains  - [place] appropriate cooling/warming therapies per order  - Administer medications as ordered  - Instruct and encourage patient and family to use good hand hygiene technique  - Identify and instruct in appropriate isolation precautions for identified infection/condition    Outcome: Progressing    Goal: Absence of fever/infection during neutropenic period  INTERVENTIONS:  - Monitor WBC  - Implement neutropenic guidelines   Outcome: Completed Date Met: 10/07/17      Problem: SAFETY ADULT  Goal: Maintain or return to baseline ADL function  INTERVENTIONS:  -  Assess patient's ability to carry out ADLs; assess patient's baseline for ADL function and identify physical deficits which impact ability to perform ADLs (bathing, care of mouth/teeth, toileting, grooming, dressing, etc )  - Assess/evaluate cause of self-care deficits   - Assess range of motion  - Assess patient's mobility; develop plan if impaired  - Assess patient's need for assistive devices and provide as appropriate  - Encourage maximum independence but intervene and supervise when necessary  ¯ Involve family in performance of ADLs  ¯ Assess for home care needs following discharge   ¯ Request OT consult to assist with ADL evaluation and planning for discharge  ¯ Provide patient education as appropriate   Outcome: Progressing    Goal: Maintain or return mobility status to optimal level  INTERVENTIONS:  - Assess patient's baseline mobility status (ambulation, transfers, stairs, etc )    - Identify cognitive and physical deficits and behaviors that affect mobility  - Identify mobility aids required to assist with transfers and/or ambulation (gait belt, sit-to-stand, lift, walker, cane, etc )  - [place] fall precautions as indicated by assessment  - Record patient progress and toleration of activity level on Mobility SBAR; progress patient to next Phase/Stage  - Instruct patient to call for assistance with activity based on assessment  - Request Rehabilitation consult to assist with strengthening/weightbearing, etc    Outcome: Progressing      Problem: DISCHARGE PLANNING  Goal: Discharge to home or other facility with appropriate resources  INTERVENTIONS:  - Identify barriers to discharge w/patient and caregiver  - Arrange for needed discharge resources and transportation as appropriate  - Identify discharge learning needs (meds, wound care, etc )  - Arrange for interpretive services to assist at discharge as needed  - Refer to Case Management Department for coordinating discharge planning if the patient needs post-hospital services based on physician/advanced practitioner order or complex needs related to functional status, cognitive ability, or social support system   Outcome: Progressing      Problem: Knowledge Deficit  Goal: Patient/family/caregiver demonstrates understanding of disease process, treatment plan, medications, and discharge instructions  Complete learning assessment and assess knowledge base    Interventions:  - Provide teaching at level of understanding  - Provide teaching via preferred learning methods   Outcome: Progressing      Problem: SUBSTANCE USE/ABUSE  Goal: Will have no detox symptoms and will verbalize plan for changing substance-related behavior  INTERVENTIONS:  - Monitor physical status and assess for symptoms of withdrawal  - Administer medication as ordered  - Provide emotional support with 1 on 1 interaction with staff  - Encourage recovery focused program/ addiction education  - Assess for verbalization of changing behaviors related to substance abuse  - Initiate consults and referrals as appropriate (Case Management, Spiritual Care, etc )   Outcome: Progressing      Problem: DISCHARGE PLANNING - CARE MANAGEMENT  Goal: Discharge to post-acute care or home with appropriate resources  INTERVENTIONS:  - Conduct assessment to determine patient/family and health care team treatment goals, and need for post-acute services based on payer coverage, community resources, and patient preferences, and barriers to discharge  - Address psychosocial, clinical, and financial barriers to discharge as identified in assessment in conjunction with the patient/family and health care team  - Arrange appropriate level of post-acute services according to patient's   needs and preference and payer coverage in collaboration with the physician and health care team  - Communicate with and update the patient/family, physician, and health care team regarding progress on the discharge plan  - Arrange appropriate transportation to post-acute venues   Outcome: Progressing

## 2017-10-08 ENCOUNTER — APPOINTMENT (INPATIENT)
Dept: CT IMAGING | Facility: HOSPITAL | Age: 52
End: 2017-10-08
Payer: COMMERCIAL

## 2017-10-08 PROBLEM — K43.2 INCISIONAL HERNIA: Chronic | Status: ACTIVE | Noted: 2017-10-08

## 2017-10-08 LAB
ALBUMIN SERPL BCP-MCNC: 3.2 G/DL (ref 3.5–5)
ALP SERPL-CCNC: 207 U/L (ref 46–116)
ALT SERPL W P-5'-P-CCNC: 36 U/L (ref 12–78)
ANION GAP SERPL CALCULATED.3IONS-SCNC: 12 MMOL/L (ref 4–13)
ANION GAP SERPL CALCULATED.3IONS-SCNC: 5 MMOL/L (ref 4–13)
AST SERPL W P-5'-P-CCNC: 85 U/L (ref 5–45)
ATRIAL RATE: 92 BPM
BILIRUB SERPL-MCNC: 0.7 MG/DL (ref 0.2–1)
BUN SERPL-MCNC: 4 MG/DL (ref 5–25)
BUN SERPL-MCNC: 5 MG/DL (ref 5–25)
CALCIUM SERPL-MCNC: 8.9 MG/DL (ref 8.3–10.1)
CALCIUM SERPL-MCNC: 9.1 MG/DL (ref 8.3–10.1)
CHLORIDE SERPL-SCNC: 89 MMOL/L (ref 100–108)
CHLORIDE SERPL-SCNC: 94 MMOL/L (ref 100–108)
CO2 SERPL-SCNC: 23 MMOL/L (ref 21–32)
CO2 SERPL-SCNC: 30 MMOL/L (ref 21–32)
CREAT SERPL-MCNC: 0.44 MG/DL (ref 0.6–1.3)
CREAT SERPL-MCNC: 0.59 MG/DL (ref 0.6–1.3)
ERYTHROCYTE [DISTWIDTH] IN BLOOD BY AUTOMATED COUNT: 21.6 % (ref 11.6–15.1)
GFR SERPL CREATININE-BSD FRML MDRD: 117 ML/MIN/1.73SQ M
GFR SERPL CREATININE-BSD FRML MDRD: 132 ML/MIN/1.73SQ M
GLUCOSE SERPL-MCNC: 123 MG/DL (ref 65–140)
GLUCOSE SERPL-MCNC: 98 MG/DL (ref 65–140)
HCT VFR BLD AUTO: 35 % (ref 36.5–49.3)
HGB BLD-MCNC: 11.4 G/DL (ref 12–17)
INR PPP: 1.07 (ref 0.86–1.16)
MAGNESIUM SERPL-MCNC: 1.9 MG/DL (ref 1.6–2.6)
MCH RBC QN AUTO: 26.5 PG (ref 26.8–34.3)
MCHC RBC AUTO-ENTMCNC: 32.6 G/DL (ref 31.4–37.4)
MCV RBC AUTO: 81 FL (ref 82–98)
P AXIS: 72 DEGREES
PLATELET # BLD AUTO: 132 THOUSANDS/UL (ref 149–390)
PMV BLD AUTO: 10.1 FL (ref 8.9–12.7)
POTASSIUM SERPL-SCNC: 3.8 MMOL/L (ref 3.5–5.3)
POTASSIUM SERPL-SCNC: 4.2 MMOL/L (ref 3.5–5.3)
PR INTERVAL: 158 MS
PROT SERPL-MCNC: 7.5 G/DL (ref 6.4–8.2)
PROTHROMBIN TIME: 13.8 SECONDS (ref 12.1–14.4)
QRS AXIS: -49 DEGREES
QRSD INTERVAL: 90 MS
QT INTERVAL: 378 MS
QTC INTERVAL: 467 MS
RBC # BLD AUTO: 4.31 MILLION/UL (ref 3.88–5.62)
SODIUM SERPL-SCNC: 124 MMOL/L (ref 136–145)
SODIUM SERPL-SCNC: 129 MMOL/L (ref 136–145)
T WAVE AXIS: 66 DEGREES
VENTRICULAR RATE: 92 BPM
WBC # BLD AUTO: 5.52 THOUSAND/UL (ref 4.31–10.16)

## 2017-10-08 PROCEDURE — 80048 BASIC METABOLIC PNL TOTAL CA: CPT | Performed by: INTERNAL MEDICINE

## 2017-10-08 PROCEDURE — 80053 COMPREHEN METABOLIC PANEL: CPT | Performed by: INTERNAL MEDICINE

## 2017-10-08 PROCEDURE — 85027 COMPLETE CBC AUTOMATED: CPT | Performed by: INTERNAL MEDICINE

## 2017-10-08 PROCEDURE — 85610 PROTHROMBIN TIME: CPT | Performed by: INTERNAL MEDICINE

## 2017-10-08 PROCEDURE — 74177 CT ABD & PELVIS W/CONTRAST: CPT

## 2017-10-08 PROCEDURE — 83735 ASSAY OF MAGNESIUM: CPT | Performed by: INTERNAL MEDICINE

## 2017-10-08 RX ORDER — DEXTROSE, SODIUM CHLORIDE, AND POTASSIUM CHLORIDE 5; .9; .15 G/100ML; G/100ML; G/100ML
150 INJECTION INTRAVENOUS CONTINUOUS
Status: DISCONTINUED | OUTPATIENT
Start: 2017-10-08 | End: 2017-10-11

## 2017-10-08 RX ADMIN — LEVETIRACETAM 1000 MG: 500 TABLET, FILM COATED ORAL at 21:03

## 2017-10-08 RX ADMIN — LORAZEPAM 2 MG: 2 INJECTION INTRAMUSCULAR; INTRAVENOUS at 09:01

## 2017-10-08 RX ADMIN — GABAPENTIN 300 MG: 300 CAPSULE ORAL at 21:03

## 2017-10-08 RX ADMIN — IOHEXOL 100 ML: 350 INJECTION, SOLUTION INTRAVENOUS at 11:28

## 2017-10-08 RX ADMIN — CHLORDIAZEPOXIDE HYDROCHLORIDE 25 MG: 25 CAPSULE ORAL at 23:08

## 2017-10-08 RX ADMIN — Medication 400 MG: at 08:59

## 2017-10-08 RX ADMIN — Medication 400 MG: at 18:09

## 2017-10-08 RX ADMIN — GABAPENTIN 300 MG: 300 CAPSULE ORAL at 16:09

## 2017-10-08 RX ADMIN — SODIUM CHLORIDE 1000 ML: 0.9 INJECTION, SOLUTION INTRAVENOUS at 18:53

## 2017-10-08 RX ADMIN — GABAPENTIN 300 MG: 300 CAPSULE ORAL at 09:00

## 2017-10-08 RX ADMIN — LISINOPRIL 10 MG: 10 TABLET ORAL at 09:00

## 2017-10-08 RX ADMIN — CHLORDIAZEPOXIDE HYDROCHLORIDE 25 MG: 25 CAPSULE ORAL at 05:07

## 2017-10-08 RX ADMIN — CHLORDIAZEPOXIDE HYDROCHLORIDE 25 MG: 25 CAPSULE ORAL at 18:08

## 2017-10-08 RX ADMIN — LORAZEPAM 2 MG: 2 INJECTION INTRAMUSCULAR; INTRAVENOUS at 14:07

## 2017-10-08 RX ADMIN — FOLIC ACID: 5 INJECTION, SOLUTION INTRAMUSCULAR; INTRAVENOUS; SUBCUTANEOUS at 18:09

## 2017-10-08 RX ADMIN — DEXTROSE, SODIUM CHLORIDE, AND POTASSIUM CHLORIDE 125 ML/HR: 5; .9; .15 INJECTION INTRAVENOUS at 09:01

## 2017-10-08 RX ADMIN — DEXTROSE, SODIUM CHLORIDE, AND POTASSIUM CHLORIDE 100 ML/HR: 5; .9; .15 INJECTION INTRAVENOUS at 03:59

## 2017-10-08 RX ADMIN — LEVETIRACETAM 1000 MG: 500 TABLET, FILM COATED ORAL at 09:00

## 2017-10-08 RX ADMIN — LORAZEPAM 2 MG: 2 INJECTION INTRAMUSCULAR; INTRAVENOUS at 18:32

## 2017-10-08 RX ADMIN — CHLORDIAZEPOXIDE HYDROCHLORIDE 25 MG: 25 CAPSULE ORAL at 11:42

## 2017-10-08 NOTE — PLAN OF CARE
Problem: Potential for Falls  Goal: Patient will remain free of falls  INTERVENTIONS:  - Assess patient frequently for physical needs  -  Identify cognitive and physical deficits and behaviors that affect risk of falls    -  [place] fall precautions as indicated by assessment   - Educate patient/family on patient safety including physical limitations  - Instruct patient to call for assistance with activity based on assessment  - Modify environment to reduce risk of injury  - Consider OT/PT consult to assist with strengthening/mobility   Outcome: Progressing      Problem: PAIN - ADULT  Goal: Verbalizes/displays adequate comfort level or baseline comfort level  Interventions:  - Encourage patient to monitor pain and request assistance  - Assess pain using appropriate pain scale, 0-10 scale  - Administer analgesics based on type and severity of pain and evaluate response  - Implement non-pharmacological measures as appropriate and evaluate response  - Consider cultural and social influences on pain and pain management  - Notify physician/advanced practitioner if interventions unsuccessful or patient reports new pain    Outcome: Progressing      Problem: INFECTION - ADULT  Goal: Absence or prevention of progression during hospitalization  INTERVENTIONS:  - Assess and monitor for signs and symptoms of infection  - Monitor lab/diagnostic results  - Monitor all insertion sites, i e  indwelling lines, tubes, and drains  - [place] appropriate cooling/warming therapies per order  - Administer medications as ordered  - Instruct and encourage patient and family to use good hand hygiene technique  - Identify and instruct in appropriate isolation precautions for identified infection/condition    Outcome: Progressing      Problem: SAFETY ADULT  Goal: Maintain or return to baseline ADL function  INTERVENTIONS:  -  Assess patient's ability to carry out ADLs; assess patient's baseline for ADL function and identify physical deficits which impact ability to perform ADLs (bathing, care of mouth/teeth, toileting, grooming, dressing, etc )  - Assess/evaluate cause of self-care deficits   - Assess range of motion  - Assess patient's mobility; develop plan if impaired  - Assess patient's need for assistive devices and provide as appropriate  - Encourage maximum independence but intervene and supervise when necessary  ¯ Involve family in performance of ADLs  ¯ Assess for home care needs following discharge   ¯ Request OT consult to assist with ADL evaluation and planning for discharge  ¯ Provide patient education as appropriate   Outcome: Progressing    Goal: Maintain or return mobility status to optimal level  INTERVENTIONS:  - Assess patient's baseline mobility status (ambulation, transfers, stairs, etc )    - Identify cognitive and physical deficits and behaviors that affect mobility  - Identify mobility aids required to assist with transfers and/or ambulation (gait belt, sit-to-stand, lift, walker, cane, etc )  - [place] fall precautions as indicated by assessment  - Record patient progress and toleration of activity level on Mobility SBAR; progress patient to next Phase/Stage  - Instruct patient to call for assistance with activity based on assessment  - Request Rehabilitation consult to assist with strengthening/weightbearing, etc    Outcome: Progressing      Problem: DISCHARGE PLANNING  Goal: Discharge to home or other facility with appropriate resources  INTERVENTIONS:  - Identify barriers to discharge w/patient and caregiver  - Arrange for needed discharge resources and transportation as appropriate  - Identify discharge learning needs (meds, wound care, etc )  - Arrange for interpretive services to assist at discharge as needed  - Refer to Case Management Department for coordinating discharge planning if the patient needs post-hospital services based on physician/advanced practitioner order or complex needs related to functional status, cognitive ability, or social support system   Outcome: Progressing      Problem: Knowledge Deficit  Goal: Patient/family/caregiver demonstrates understanding of disease process, treatment plan, medications, and discharge instructions  Complete learning assessment and assess knowledge base    Interventions:  - Provide teaching at level of understanding  - Provide teaching via preferred learning methods   Outcome: Progressing      Problem: SUBSTANCE USE/ABUSE  Goal: Will have no detox symptoms and will verbalize plan for changing substance-related behavior  INTERVENTIONS:  - Monitor physical status and assess for symptoms of withdrawal  - Administer medication as ordered  - Provide emotional support with 1 on 1 interaction with staff  - Encourage recovery focused program/ addiction education  - Assess for verbalization of changing behaviors related to substance abuse  - Initiate consults and referrals as appropriate (Case Management, Spiritual Care, etc )   Outcome: Progressing      Problem: DISCHARGE PLANNING - CARE MANAGEMENT  Goal: Discharge to post-acute care or home with appropriate resources  INTERVENTIONS:  - Conduct assessment to determine patient/family and health care team treatment goals, and need for post-acute services based on payer coverage, community resources, and patient preferences, and barriers to discharge  - Address psychosocial, clinical, and financial barriers to discharge as identified in assessment in conjunction with the patient/family and health care team  - Arrange appropriate level of post-acute services according to patient's   needs and preference and payer coverage in collaboration with the physician and health care team  - Communicate with and update the patient/family, physician, and health care team regarding progress on the discharge plan  - Arrange appropriate transportation to post-acute venues   Outcome: Progressing

## 2017-10-08 NOTE — PROGRESS NOTES
[facility] Progress Note  Patient: Katie Huertas 47 y o  male   MRN: [id number]  PCP: Rodriguez Easton PA-C  Unit/Bed#: 283-02 Encounter: [id number]  Date Of Visit: 10/08/17    Assessment:    Principal Problem:    Alcohol withdrawal syndrome ([place] [address] )  Active Problems:    Tobacco use    Essential hypertension    COPD (chronic obstructive pulmonary disease) ([place] [address] )    Cholelithiasis    Hypomagnesemia    Dehydration    Malnutrition of moderate degree (HCC)    Hyponatremia    Seizure ([place] [address] )    Continuous chronic alcoholism ([place] [address] )    Chronic incisional hernia    Plan:    · CT abd/pelvis to exclude incarcerated incisional hernia  · Increase IV fluid rate( increased insensitive fluid loss from alcohol withdrawal )  · Cont current benzodiazepine regimen  · Immediate ICU transfer if delirium tremens      VTE Pharmacologic Prophylaxis:   Pharmacologic: Patient has refused VTE prophylaxis  Mechanical VTE Prophylaxis in Place: Yes    Patient Centered Rounds: I have performed bedside rounds with nursing staff today  Current Length of Stay: 3 day(s)    Current Patient Status: Inpatient     Code Status: Level 1 - Full Code      Subjective:   Still shaky, tolerating PO  Painful abdominal distension around incisional hernia worse    Objective:     Vitals:   Temp (24hrs), Av 6 °F (36 4 °C), Min:97 5 °F (36 4 °C), Max:97 7 °F (36 5 °C)    HR:  [] 73  Resp:  [16-19] 16  BP: (129-151)/(89-96) 151/92  SpO2:  [96 %] 96 %  Body mass index is 21 87 kg/m²  Input and Output Summary (last 24 hours):        Intake/Output Summary (Last 24 hours) at 10/08/17 0945  Last data filed at 10/08/17 0840   Gross per 24 hour   Intake          2846 67 ml   Output             4470 ml   Net         -1623 33 ml       Physical Exam:   General: in no overt distress  HEENT: oral mucosa moist, not pale, not jaundiced  Chest: clear lungs, no wheeze  Heart: S1 S2 audible, regular  Abd: soft, tenderness around midline incisional scar hernia, bowel sounds present, no rebound  Psych: appropriately oriented in all spheres  Neuro: Awake, alert, asterixis +ve, essentially nonfocal      Additional Data:     Labs:      Results from last 7 days  Lab Units 10/08/17  0424  10/05/17  1700   WBC Thousand/uL 5 52  < > 6 12   HEMOGLOBIN g/dL 11 4*  < > 11 4*   HEMATOCRIT % 35 0*  < > 34 2*   PLATELETS Thousands/uL 132*  < > 174   NEUTROS PCT %  --   --  36*   LYMPHS PCT %  --   --  49*   MONOS PCT %  --   --  10   EOS PCT %  --   --  4   < > = values in this interval not displayed  Results from last 7 days  Lab Units 10/08/17  0424   SODIUM mmol/L 129*   POTASSIUM mmol/L 3 8   CHLORIDE mmol/L 94*   CO2 mmol/L 30   BUN mg/dL 4*   CREATININE mg/dL 0 44*   CALCIUM mg/dL 9 1   TOTAL PROTEIN g/dL 7 5   BILIRUBIN TOTAL mg/dL 0 70   ALK PHOS U/L 207*   ALT U/L 36   AST U/L 85*   GLUCOSE RANDOM mg/dL 98       Results from last 7 days  Lab Units 10/08/17  0424   INR  1 07     Invalid input(s): SMITHA    * I Have Reviewed All Lab Data Listed Above  * Additional Pertinent Lab Tests Reviewed: All Labs Within Last 24 Hours Reviewed        Recent Cultures (last 7 days):           Last 24 Hours Medication List:     chlordiazePOXIDE 25 mg Oral Q6H [address]   gabapentin 300 mg Oral TID   levETIRAcetam 1,000 mg Oral Q12H [address]   lisinopril 10 mg Oral Daily   magnesium oxide 400 mg Oral BID   IV infusion builder  Intravenous Q24H        Today, Patient Was Seen By: Raisa Rodas MD    ** Please Note: Dragon 360 Dictation voice to text software may have been used in the creation of this document   **

## 2017-10-09 ENCOUNTER — APPOINTMENT (INPATIENT)
Dept: PHYSICAL THERAPY | Facility: HOSPITAL | Age: 52
End: 2017-10-09
Payer: COMMERCIAL

## 2017-10-09 LAB
ALBUMIN SERPL BCP-MCNC: 3.1 G/DL (ref 3.5–5)
ALP SERPL-CCNC: 192 U/L (ref 46–116)
ALT SERPL W P-5'-P-CCNC: 36 U/L (ref 12–78)
ANION GAP SERPL CALCULATED.3IONS-SCNC: 9 MMOL/L (ref 4–13)
AST SERPL W P-5'-P-CCNC: 76 U/L (ref 5–45)
ATRIAL RATE: 83 BPM
BILIRUB SERPL-MCNC: 0.5 MG/DL (ref 0.2–1)
BUN SERPL-MCNC: 3 MG/DL (ref 5–25)
CALCIUM SERPL-MCNC: 8.9 MG/DL (ref 8.3–10.1)
CHLORIDE SERPL-SCNC: 96 MMOL/L (ref 100–108)
CO2 SERPL-SCNC: 26 MMOL/L (ref 21–32)
CREAT SERPL-MCNC: 0.46 MG/DL (ref 0.6–1.3)
ERYTHROCYTE [DISTWIDTH] IN BLOOD BY AUTOMATED COUNT: 21.2 % (ref 11.6–15.1)
GFR SERPL CREATININE-BSD FRML MDRD: 130 ML/MIN/1.73SQ M
GLUCOSE SERPL-MCNC: 99 MG/DL (ref 65–140)
HCT VFR BLD AUTO: 31.6 % (ref 36.5–49.3)
HGB BLD-MCNC: 10.2 G/DL (ref 12–17)
LEVETIRACETAM SERPL-MCNC: 30.1 UG/ML (ref 10–40)
MAGNESIUM SERPL-MCNC: 1.7 MG/DL (ref 1.6–2.6)
MCH RBC QN AUTO: 26.5 PG (ref 26.8–34.3)
MCHC RBC AUTO-ENTMCNC: 32.3 G/DL (ref 31.4–37.4)
MCV RBC AUTO: 82 FL (ref 82–98)
P AXIS: 54 DEGREES
PLATELET # BLD AUTO: 136 THOUSANDS/UL (ref 149–390)
PMV BLD AUTO: 10.2 FL (ref 8.9–12.7)
POTASSIUM SERPL-SCNC: 3.6 MMOL/L (ref 3.5–5.3)
PR INTERVAL: 140 MS
PROT SERPL-MCNC: 7.2 G/DL (ref 6.4–8.2)
QRS AXIS: -49 DEGREES
QRSD INTERVAL: 92 MS
QT INTERVAL: 384 MS
QTC INTERVAL: 451 MS
RBC # BLD AUTO: 3.85 MILLION/UL (ref 3.88–5.62)
SODIUM SERPL-SCNC: 131 MMOL/L (ref 136–145)
T WAVE AXIS: 44 DEGREES
VENTRICULAR RATE: 83 BPM
WBC # BLD AUTO: 7.02 THOUSAND/UL (ref 4.31–10.16)

## 2017-10-09 PROCEDURE — 97116 GAIT TRAINING THERAPY: CPT

## 2017-10-09 PROCEDURE — 83735 ASSAY OF MAGNESIUM: CPT | Performed by: INTERNAL MEDICINE

## 2017-10-09 PROCEDURE — 97110 THERAPEUTIC EXERCISES: CPT

## 2017-10-09 PROCEDURE — 80053 COMPREHEN METABOLIC PANEL: CPT | Performed by: INTERNAL MEDICINE

## 2017-10-09 PROCEDURE — 85027 COMPLETE CBC AUTOMATED: CPT | Performed by: INTERNAL MEDICINE

## 2017-10-09 PROCEDURE — 97530 THERAPEUTIC ACTIVITIES: CPT

## 2017-10-09 RX ORDER — CHLORDIAZEPOXIDE HYDROCHLORIDE 25 MG/1
25 CAPSULE, GELATIN COATED ORAL 3 TIMES DAILY
Status: DISCONTINUED | OUTPATIENT
Start: 2017-10-09 | End: 2017-10-12 | Stop reason: HOSPADM

## 2017-10-09 RX ORDER — LORAZEPAM 2 MG/ML
1 INJECTION INTRAMUSCULAR EVERY 4 HOURS PRN
Status: DISCONTINUED | OUTPATIENT
Start: 2017-10-09 | End: 2017-10-12 | Stop reason: HOSPADM

## 2017-10-09 RX ADMIN — DEXTROSE, SODIUM CHLORIDE, AND POTASSIUM CHLORIDE 150 ML/HR: 5; .9; .15 INJECTION INTRAVENOUS at 13:30

## 2017-10-09 RX ADMIN — GABAPENTIN 300 MG: 300 CAPSULE ORAL at 15:43

## 2017-10-09 RX ADMIN — DEXTROSE, SODIUM CHLORIDE, AND POTASSIUM CHLORIDE 125 ML/HR: 5; .9; .15 INJECTION INTRAVENOUS at 05:17

## 2017-10-09 RX ADMIN — Medication 400 MG: at 08:42

## 2017-10-09 RX ADMIN — GABAPENTIN 300 MG: 300 CAPSULE ORAL at 20:41

## 2017-10-09 RX ADMIN — LISINOPRIL 10 MG: 10 TABLET ORAL at 08:41

## 2017-10-09 RX ADMIN — FOLIC ACID: 5 INJECTION, SOLUTION INTRAMUSCULAR; INTRAVENOUS; SUBCUTANEOUS at 18:49

## 2017-10-09 RX ADMIN — GABAPENTIN 300 MG: 300 CAPSULE ORAL at 08:42

## 2017-10-09 RX ADMIN — LEVETIRACETAM 1000 MG: 500 TABLET, FILM COATED ORAL at 20:40

## 2017-10-09 RX ADMIN — Medication 400 MG: at 18:48

## 2017-10-09 RX ADMIN — LEVETIRACETAM 1000 MG: 500 TABLET, FILM COATED ORAL at 08:42

## 2017-10-09 RX ADMIN — LORAZEPAM 2 MG: 2 INJECTION INTRAMUSCULAR; INTRAVENOUS at 09:52

## 2017-10-09 RX ADMIN — CHLORDIAZEPOXIDE HYDROCHLORIDE 25 MG: 25 CAPSULE ORAL at 05:15

## 2017-10-09 RX ADMIN — CHLORDIAZEPOXIDE HYDROCHLORIDE 25 MG: 25 CAPSULE ORAL at 11:31

## 2017-10-09 RX ADMIN — CHLORDIAZEPOXIDE HYDROCHLORIDE 25 MG: 25 CAPSULE ORAL at 22:29

## 2017-10-09 NOTE — PHYSICAL THERAPY NOTE
PT treatment Note      10/09/17 1001   Restrictions/Precautions   Other Precautions (Seizure;Multiple lines;Telemetry; Fall Risk;Pain)   Subjective   Subjective c/o feeling unsteady when ambulating  Bed Mobility   Supine to Sit 5  Supervision   Sit to Supine 5  Supervision   Additional Comments Pt with no difficulty performing bed mobility   Transfers   Sit to Stand (CGA)   Additional items Verbal cues   Stand to Sit (CGA)   Additional items Verbal cues   Stand pivot (CGA)   Toilet transfer (CGA)   Ambulation/Elevation   Gait pattern (Narrow MCKENZIE; shakiness)   Gait Assistance (CGA)   Additional items Assist x 1   Assistive Device Rolling walker   Distance 200' slow [place]   Balance   Static Sitting Good   Dynamic Sitting Good   Static Standing Fair   Dynamic Standing Fair   Ambulatory Fair  (with RW)   Endurance Deficit   Endurance Deficit Yes   Activity Tolerance   Activity Tolerance Patient limited by fatigue   Exercises   Hip Flexion Sitting;20 reps;AROM; Bilateral   Hip Abduction Sitting;20 reps;AROM; Bilateral   Hip Adduction Sitting;20 reps;AROM; Bilateral   Knee AROM Long Arc Quad Sitting;20 reps;AROM; Bilateral   Ankle Pumps Sitting;20 reps;AROM; Bilateral   Assessment   Prognosis Good   Problem List Decreased strength;Decreased endurance; Impaired balance;Decreased mobility   Assessment Pt  performing functional mobility at (S- CGA) x 1 level of function  Slow unsteady gait  Pt is in need of continued activity in PT to improve impairments and functional deficits  Anticipate when medically stable pt with be safe to return home but may benefit from Home health care services  Plan   Treatment/Interventions Functional transfer training;LE strengthening/ROM; Therapeutic exercise; Endurance training;Bed mobility;Gait training   Progress Progressing toward goals   Recommendation   Recommendation Home PT   Pt  OOB with call bell within reach, scd's connected and turned on and alarm on at end of PT session

## 2017-10-09 NOTE — PROGRESS NOTES
[facility] Progress Note  Patient: Krish Beltran 47 y o  male   MRN: [id number]  PCP: Rachel Nael PA-C  Unit/Bed#: 933-20 Encounter: [id number]  Date Of Visit: 10/09/17    Assessment:    Principal Problem:    Alcohol withdrawal syndrome ([place] [address] )  Active Problems:    Tobacco use    Essential hypertension    COPD (chronic obstructive pulmonary disease) ([place] [address] )    Cholelithiasis    Hypomagnesemia    Dehydration    Malnutrition of moderate degree (HCC)    Hyponatremia    Seizure ([place] [address] )    Continuous chronic alcoholism ([place] [address] )    Incisional hernia      Plan:    · Admitted after a seizure episode, serum etOH 398 mg% at the ED  · Keppra increased, he understands he needs outpatient follow-up with neurology   · Declined into alcohol withdrawal syndrome which appears improved today  · Gradually wean him off benzodiazepines next 24-48hrs as tolerated  · Cont IV fluids due to insensible fluid loss from withdrawal, as well as daily IV banana bag  · Discharge with home PT when medically stable      VTE Pharmacologic Prophylaxis:   Pharmacologic: Patient has refused VTE prophylaxis  Mechanical VTE Prophylaxis in Place: Yes    Patient Centered Rounds: I have performed bedside rounds with nursing staff today  Current Length of Stay: 4 day(s)    Current Patient Status: Inpatient     Code Status: Level 1 - Full Code      Subjective:   Feels better today, continues to tolerate PO  Less shaky, no hallucination  Abd painful tenderness at incisional hernia site improved    Objective:     Vitals:   Temp (24hrs), Av 5 °F (36 4 °C), Min:97 1 °F (36 2 °C), Max:98 °F (36 7 °C)    HR:  [79-86] 79  Resp:  [16-18] 18  BP: (137-142)/(89-91) 142/91  SpO2:  [95 %-98 %] 98 %  Body mass index is 22 67 kg/m²  Input and Output Summary (last 24 hours):        Intake/Output Summary (Last 24 hours) at 10/09/17 1146  Last data filed at 10/09/17 0800   Gross per 24 hour   Intake             2070 ml   Output             3220 ml   Net -1150 ml       Physical Exam:   General: in no overt distress  HEENT: oral mucosa moist, not pale, not jaundiced  Psych: appropriately oriented in all spheres, good insight  Neuro: Awake, alert, mild asterixis, essentially nonfocal      Additional Data:     Labs:      Results from last 7 days  Lab Units 10/09/17  0517  10/05/17  1700   WBC Thousand/uL 7 02  < > 6 12   HEMOGLOBIN g/dL 10 2*  < > 11 4*   HEMATOCRIT % 31 6*  < > 34 2*   PLATELETS Thousands/uL 136*  < > 174   NEUTROS PCT %  --   --  36*   LYMPHS PCT %  --   --  49*   MONOS PCT %  --   --  10   EOS PCT %  --   --  4   < > = values in this interval not displayed  Results from last 7 days  Lab Units 10/09/17  0517   SODIUM mmol/L 131*   POTASSIUM mmol/L 3 6   CHLORIDE mmol/L 96*   CO2 mmol/L 26   BUN mg/dL 3*   CREATININE mg/dL 0 46*   CALCIUM mg/dL 8 9   TOTAL PROTEIN g/dL 7 2   BILIRUBIN TOTAL mg/dL 0 50   ALK PHOS U/L 192*   ALT U/L 36   AST U/L 76*   GLUCOSE RANDOM mg/dL 99       Results from last 7 days  Lab Units 10/08/17  0424   INR  1 07     Invalid input(s): TROIP    * I Have Reviewed All Lab Data Listed Above  * Additional Pertinent Lab Tests Reviewed: All Labs Within Last 24 Hours Reviewed      Recent Cultures (last 7 days):           Last 24 Hours Medication List:     chlordiazePOXIDE 25 mg Oral TID   gabapentin 300 mg Oral TID   levETIRAcetam 1,000 mg Oral Q12H [address]   lisinopril 10 mg Oral Daily   magnesium oxide 400 mg Oral BID   IV infusion builder  Intravenous Q24H        Today, Patient Was Seen By: Kurtis Felipe MD    ** Please Note: Dragon 360 Dictation voice to text software may have been used in the creation of this document   **

## 2017-10-09 NOTE — PLAN OF CARE
Problem: Potential for Falls  Goal: Patient will remain free of falls  INTERVENTIONS:  - Assess patient frequently for physical needs  -  Identify cognitive and physical deficits and behaviors that affect risk of falls    -  [place] fall precautions as indicated by assessment   - Educate patient/family on patient safety including physical limitations  - Instruct patient to call for assistance with activity based on assessment  - Modify environment to reduce risk of injury  - Consider OT/PT consult to assist with strengthening/mobility   Outcome: Progressing      Problem: PAIN - ADULT  Goal: Verbalizes/displays adequate comfort level or baseline comfort level  Interventions:  - Encourage patient to monitor pain and request assistance  - Assess pain using appropriate pain scale, 0-10 scale  - Administer analgesics based on type and severity of pain and evaluate response  - Implement non-pharmacological measures as appropriate and evaluate response  - Consider cultural and social influences on pain and pain management  - Notify physician/advanced practitioner if interventions unsuccessful or patient reports new pain    Outcome: Progressing      Problem: INFECTION - ADULT  Goal: Absence or prevention of progression during hospitalization  INTERVENTIONS:  - Assess and monitor for signs and symptoms of infection  - Monitor lab/diagnostic results  - Monitor all insertion sites, i e  indwelling lines, tubes, and drains  - [place] appropriate cooling/warming therapies per order  - Administer medications as ordered  - Instruct and encourage patient and family to use good hand hygiene technique  - Identify and instruct in appropriate isolation precautions for identified infection/condition    Outcome: Progressing      Problem: SAFETY ADULT  Goal: Maintain or return to baseline ADL function  INTERVENTIONS:  -  Assess patient's ability to carry out ADLs; assess patient's baseline for ADL function and identify physical deficits which impact ability to perform ADLs (bathing, care of mouth/teeth, toileting, grooming, dressing, etc )  - Assess/evaluate cause of self-care deficits   - Assess range of motion  - Assess patient's mobility; develop plan if impaired  - Assess patient's need for assistive devices and provide as appropriate  - Encourage maximum independence but intervene and supervise when necessary  ¯ Involve family in performance of ADLs  ¯ Assess for home care needs following discharge   ¯ Request OT consult to assist with ADL evaluation and planning for discharge  ¯ Provide patient education as appropriate   Outcome: Progressing    Goal: Maintain or return mobility status to optimal level  INTERVENTIONS:  - Assess patient's baseline mobility status (ambulation, transfers, stairs, etc )    - Identify cognitive and physical deficits and behaviors that affect mobility  - Identify mobility aids required to assist with transfers and/or ambulation (gait belt, sit-to-stand, lift, walker, cane, etc )  - [place] fall precautions as indicated by assessment  - Record patient progress and toleration of activity level on Mobility SBAR; progress patient to next Phase/Stage  - Instruct patient to call for assistance with activity based on assessment  - Request Rehabilitation consult to assist with strengthening/weightbearing, etc    Outcome: Progressing      Problem: DISCHARGE PLANNING  Goal: Discharge to home or other facility with appropriate resources  INTERVENTIONS:  - Identify barriers to discharge w/patient and caregiver  - Arrange for needed discharge resources and transportation as appropriate  - Identify discharge learning needs (meds, wound care, etc )  - Arrange for interpretive services to assist at discharge as needed  - Refer to Case Management Department for coordinating discharge planning if the patient needs post-hospital services based on physician/advanced practitioner order or complex needs related to functional status, cognitive ability, or social support system   Outcome: Progressing      Problem: Knowledge Deficit  Goal: Patient/family/caregiver demonstrates understanding of disease process, treatment plan, medications, and discharge instructions  Complete learning assessment and assess knowledge base    Interventions:  - Provide teaching at level of understanding  - Provide teaching via preferred learning methods   Outcome: Progressing      Problem: SUBSTANCE USE/ABUSE  Goal: Will have no detox symptoms and will verbalize plan for changing substance-related behavior  INTERVENTIONS:  - Monitor physical status and assess for symptoms of withdrawal  - Administer medication as ordered  - Provide emotional support with 1 on 1 interaction with staff  - Encourage recovery focused program/ addiction education  - Assess for verbalization of changing behaviors related to substance abuse  - Initiate consults and referrals as appropriate (Case Management, Spiritual Care, etc )   Outcome: Progressing      Problem: DISCHARGE PLANNING - CARE MANAGEMENT  Goal: Discharge to post-acute care or home with appropriate resources  INTERVENTIONS:  - Conduct assessment to determine patient/family and health care team treatment goals, and need for post-acute services based on payer coverage, community resources, and patient preferences, and barriers to discharge  - Address psychosocial, clinical, and financial barriers to discharge as identified in assessment in conjunction with the patient/family and health care team  - Arrange appropriate level of post-acute services according to patient's   needs and preference and payer coverage in collaboration with the physician and health care team  - Communicate with and update the patient/family, physician, and health care team regarding progress on the discharge plan  - Arrange appropriate transportation to post-acute venues   Outcome: Progressing      Problem: Prexisting or High Potential for Compromised Skin Integrity  Goal: Skin integrity is maintained or improved  INTERVENTIONS:  - Identify patients at risk for skin breakdown  - Assess and monitor skin integrity  - Assess and monitor nutrition and hydration status  - Monitor labs (i e  albumin)  - Assess for incontinence   - Turn and reposition patient  - Assist with mobility/ambulation  - Relieve pressure over bony prominences  - Avoid friction and shearing  - Provide appropriate hygiene as needed including keeping skin clean and dry  - Evaluate need for skin moisturizer/barrier cream  - Collaborate with interdisciplinary team (i e  Nutrition, Rehabilitation, etc )   - Patient/family teaching   Outcome: Progressing

## 2017-10-10 ENCOUNTER — APPOINTMENT (INPATIENT)
Dept: PHYSICAL THERAPY | Facility: HOSPITAL | Age: 52
End: 2017-10-10
Payer: COMMERCIAL

## 2017-10-10 LAB
ALBUMIN SERPL BCP-MCNC: 3.1 G/DL (ref 3.5–5)
ALP SERPL-CCNC: 211 U/L (ref 46–116)
ALT SERPL W P-5'-P-CCNC: 44 U/L (ref 12–78)
ANION GAP SERPL CALCULATED.3IONS-SCNC: 10 MMOL/L (ref 4–13)
AST SERPL W P-5'-P-CCNC: 76 U/L (ref 5–45)
BILIRUB SERPL-MCNC: 0.4 MG/DL (ref 0.2–1)
BUN SERPL-MCNC: 5 MG/DL (ref 5–25)
CALCIUM SERPL-MCNC: 9.4 MG/DL (ref 8.3–10.1)
CHLORIDE SERPL-SCNC: 96 MMOL/L (ref 100–108)
CO2 SERPL-SCNC: 26 MMOL/L (ref 21–32)
CREAT SERPL-MCNC: 0.44 MG/DL (ref 0.6–1.3)
ERYTHROCYTE [DISTWIDTH] IN BLOOD BY AUTOMATED COUNT: 21.4 % (ref 11.6–15.1)
GFR SERPL CREATININE-BSD FRML MDRD: 132 ML/MIN/1.73SQ M
GLUCOSE SERPL-MCNC: 92 MG/DL (ref 65–140)
HCT VFR BLD AUTO: 31.9 % (ref 36.5–49.3)
HGB BLD-MCNC: 10.2 G/DL (ref 12–17)
MAGNESIUM SERPL-MCNC: 1.6 MG/DL (ref 1.6–2.6)
MCH RBC QN AUTO: 26.5 PG (ref 26.8–34.3)
MCHC RBC AUTO-ENTMCNC: 32 G/DL (ref 31.4–37.4)
MCV RBC AUTO: 83 FL (ref 82–98)
PLATELET # BLD AUTO: 150 THOUSANDS/UL (ref 149–390)
PMV BLD AUTO: 10.6 FL (ref 8.9–12.7)
POTASSIUM SERPL-SCNC: 3.7 MMOL/L (ref 3.5–5.3)
PROT SERPL-MCNC: 7.2 G/DL (ref 6.4–8.2)
RBC # BLD AUTO: 3.85 MILLION/UL (ref 3.88–5.62)
SODIUM SERPL-SCNC: 132 MMOL/L (ref 136–145)
WBC # BLD AUTO: 5.93 THOUSAND/UL (ref 4.31–10.16)

## 2017-10-10 PROCEDURE — 80053 COMPREHEN METABOLIC PANEL: CPT | Performed by: INTERNAL MEDICINE

## 2017-10-10 PROCEDURE — 97110 THERAPEUTIC EXERCISES: CPT

## 2017-10-10 PROCEDURE — 97530 THERAPEUTIC ACTIVITIES: CPT

## 2017-10-10 PROCEDURE — 85027 COMPLETE CBC AUTOMATED: CPT | Performed by: INTERNAL MEDICINE

## 2017-10-10 PROCEDURE — 97116 GAIT TRAINING THERAPY: CPT

## 2017-10-10 PROCEDURE — 83735 ASSAY OF MAGNESIUM: CPT | Performed by: INTERNAL MEDICINE

## 2017-10-10 RX ADMIN — Medication 400 MG: at 08:07

## 2017-10-10 RX ADMIN — GABAPENTIN 300 MG: 300 CAPSULE ORAL at 08:07

## 2017-10-10 RX ADMIN — CHLORDIAZEPOXIDE HYDROCHLORIDE 25 MG: 25 CAPSULE ORAL at 08:07

## 2017-10-10 RX ADMIN — DEXTROSE, SODIUM CHLORIDE, AND POTASSIUM CHLORIDE 150 ML/HR: 5; .9; .15 INJECTION INTRAVENOUS at 11:48

## 2017-10-10 RX ADMIN — DEXTROSE, SODIUM CHLORIDE, AND POTASSIUM CHLORIDE 150 ML/HR: 5; .9; .15 INJECTION INTRAVENOUS at 17:53

## 2017-10-10 RX ADMIN — FOLIC ACID: 5 INJECTION, SOLUTION INTRAMUSCULAR; INTRAVENOUS; SUBCUTANEOUS at 17:52

## 2017-10-10 RX ADMIN — LEVETIRACETAM 1000 MG: 500 TABLET, FILM COATED ORAL at 22:44

## 2017-10-10 RX ADMIN — DEXTROSE, SODIUM CHLORIDE, AND POTASSIUM CHLORIDE 150 ML/HR: 5; .9; .15 INJECTION INTRAVENOUS at 05:02

## 2017-10-10 RX ADMIN — LEVETIRACETAM 1000 MG: 500 TABLET, FILM COATED ORAL at 08:07

## 2017-10-10 RX ADMIN — ACETAMINOPHEN 650 MG: 325 TABLET, FILM COATED ORAL at 00:11

## 2017-10-10 RX ADMIN — CHLORDIAZEPOXIDE HYDROCHLORIDE 25 MG: 25 CAPSULE ORAL at 16:18

## 2017-10-10 RX ADMIN — GABAPENTIN 300 MG: 300 CAPSULE ORAL at 16:18

## 2017-10-10 RX ADMIN — CHLORDIAZEPOXIDE HYDROCHLORIDE 25 MG: 25 CAPSULE ORAL at 22:45

## 2017-10-10 RX ADMIN — GABAPENTIN 300 MG: 300 CAPSULE ORAL at 22:44

## 2017-10-10 RX ADMIN — LISINOPRIL 10 MG: 10 TABLET ORAL at 08:07

## 2017-10-10 RX ADMIN — Medication 400 MG: at 17:53

## 2017-10-10 NOTE — PHYSICAL THERAPY NOTE
PT treatment Note      10/10/17 0908   Pain Assessment   Pain Score No Pain   Restrictions/Precautions   Other Precautions (Seizure;Multiple lines;Telemetry; Fall Risk;Pain)   Subjective   Subjective c/o weakness in legs while ambulating   Bed Mobility   Supine to Sit 5  Supervision   Transfers   Sit to Stand (CGA)   Additional items Verbal cues   Stand to Sit (CGA)   Additional items Bed elevated   Stand pivot (CGA)   Additional items Verbal cues   Ambulation/Elevation   Gait pattern Narrow MCKENZIE;Shuffling   Gait Assistance (CGA)   Additional items Assist x 1   Assistive Device Rolling walker   Distance 110'   Balance   Static Sitting Good   Dynamic Sitting Good   Static Standing Fair   Dynamic Standing Fair   Ambulatory Fair  (with RW)   Endurance Deficit   Endurance Deficit Yes   Activity Tolerance   Activity Tolerance Patient limited by fatigue   Exercises   Hip Flexion Sitting;20 reps;AROM; Bilateral   Hip Abduction Sitting;20 reps;AROM; Bilateral   Hip Adduction Sitting;20 reps;AROM; Bilateral   Knee AROM Long Arc Quad Sitting;20 reps;AROM; Bilateral   Ankle Pumps Sitting;20 reps;AROM; Bilateral   Assessment   Prognosis Good   Problem List Decreased strength;Decreased endurance; Impaired balance;Decreased mobility; Decreased coordination   Assessment Pt  performing functional mobility at (S- CGA) x 1 level of function  Slow unsteady gait  Unable to ambulate as far this session due to LE weakness  Pt is in need of continued activity in PT to improve impairments and functional deficits  Anticipate when medically stable pt with be safe to return home but may benefit from Home health care services  Plan   Treatment/Interventions Functional transfer training;LE strengthening/ROM; Therapeutic exercise; Endurance training;Bed mobility;Gait training   Progress Slow progress, decreased activity tolerance   Recommendation   Recommendation Home PT   Pt  OOB with call bell within reach  and alarm on at end of PT session

## 2017-10-10 NOTE — SOCIAL WORK
Continuing to follow pt and assist in dc planning  Therapy recommending home with homecare/home PT on dc  And Cm will also be discussing on dc with pt ?outpatient drug and alcohol information if he is receptive  Cm will continue to follow

## 2017-10-10 NOTE — CASE MANAGEMENT
Continued Stay Review    Date: 10/10/17    Vital Signs: /80   Pulse 70   Temp (!) 97 °F (36 1 °C) (Temporal)   Resp 17   Ht 5' 5" (1 651 m)   Wt 61 8 kg (136 lb 3 9 oz)   SpO2 98%   BMI 22 67 kg/m²     Medications:   Scheduled Meds:   chlordiazePOXIDE 25 mg Oral TID   gabapentin 300 mg Oral TID   levETIRAcetam 1,000 mg Oral Q12H KIMBERLY   lisinopril 10 mg Oral Daily   magnesium oxide 400 mg Oral BID   IV infusion builder  Intravenous Q24H     Continuous Infusions:   dextrose 5 % and sodium chloride 0 9 % with KCl 20 mEq/L 150 mL/hr Last Rate: 150 mL/hr (10/10/17 0502)     PRN Meds:   acetaminophen    albuterol    cloNIDine    HYDROcodone-acetaminophen    LORazepam    Abnormal Labs/Diagnostic Results:    CL 96 Cr 0 44 AST 76 ALK PHOS 211 ALB 3 1 HGB 10 2    CT A/P=Again seen is a punctate calcification in the pancreatic head suspicious for common bile duct stone  There is no associated biliary ductal dilatation  (Series 2 image 31 )  Cholelithiasis without CT evidence of acute cholecystitis  Again seen is diffuse urinary bladder wall thickening, which could be cystitis  Please correlate with urinalysis    Age/Sex: 47 y o  male     Assessment/Plan:   Principal Problem:    Alcohol withdrawal syndrome (HCC)  Active Problems:    Tobacco use    Essential hypertension    COPD (chronic obstructive pulmonary disease) (HCC)    Cholelithiasis    Hypomagnesemia    Dehydration    Malnutrition of moderate degree (HCC)    Hyponatremia    Seizure (HCC)    Continuous chronic alcoholism (HCC)    Incisional hernia  Plan:  · Admitted after a seizure episode, serum etOH 398 mg% at the ED  · Keppra increased, he understands he needs outpatient follow-up with neurology   · Declined into alcohol withdrawal syndrome which appears improved today  · Gradually wean him off benzodiazepines next 24-48hrs as tolerated  · Cont IV fluids due to insensible fluid loss from withdrawal, as well as daily IV banana bag  Discharge Plan: [facility] in the [facility] for 2017  Network Utilization Review Department  Phone: [phone]; Fax [phone]  ATTENTION: The Network Utilization Review Department is now centralized for our 7 Facilities  Make a note that we have a new phone and fax numbers for our Department  Please call with any questions or concerns to [phone] and carefully follow the prompts so that you are directed to the right person  All voicemails are confidential  Fax any determinations, approvals, denials, and requests for initial or continue stay review clinical to [phone]  Due to HIGH CALL volume, it would be easier if you could please send faxed requests to expedite your requests and in part, help us provide discharge notifications faster

## 2017-10-10 NOTE — PLAN OF CARE
Problem: DISCHARGE PLANNING - CARE MANAGEMENT  Goal: Discharge to post-acute care or home with appropriate resources  INTERVENTIONS:  - Conduct assessment to determine patient/family and health care team treatment goals, and need for post-acute services based on payer coverage, community resources, and patient preferences, and barriers to discharge  - Address psychosocial, clinical, and financial barriers to discharge as identified in assessment in conjunction with the patient/family and health care team  - Arrange appropriate level of post-acute services according to patient's   needs and preference and payer coverage in collaboration with the physician and health care team  - Communicate with and update the patient/family, physician, and health care team regarding progress on the discharge plan  - Arrange appropriate transportation to post-acute venues   Outcome: Progressing  -probable homecare/home PT on dc  -?outpatient drug and alcohol information if pt is receptive

## 2017-10-11 ENCOUNTER — APPOINTMENT (INPATIENT)
Dept: OCCUPATIONAL THERAPY | Facility: HOSPITAL | Age: 52
End: 2017-10-11
Payer: COMMERCIAL

## 2017-10-11 ENCOUNTER — APPOINTMENT (INPATIENT)
Dept: PHYSICAL THERAPY | Facility: HOSPITAL | Age: 52
End: 2017-10-11
Payer: COMMERCIAL

## 2017-10-11 LAB
ANION GAP SERPL CALCULATED.3IONS-SCNC: 8 MMOL/L (ref 4–13)
BASOPHILS # BLD AUTO: 0.05 THOUSANDS/ΜL (ref 0–0.1)
BASOPHILS NFR BLD AUTO: 1 % (ref 0–1)
BUN SERPL-MCNC: 3 MG/DL (ref 5–25)
CALCIUM SERPL-MCNC: 9.2 MG/DL (ref 8.3–10.1)
CHLORIDE SERPL-SCNC: 94 MMOL/L (ref 100–108)
CO2 SERPL-SCNC: 27 MMOL/L (ref 21–32)
CREAT SERPL-MCNC: 0.54 MG/DL (ref 0.6–1.3)
EOSINOPHIL # BLD AUTO: 0.43 THOUSAND/ΜL (ref 0–0.61)
EOSINOPHIL NFR BLD AUTO: 7 % (ref 0–6)
ERYTHROCYTE [DISTWIDTH] IN BLOOD BY AUTOMATED COUNT: 21.8 % (ref 11.6–15.1)
FERRITIN SERPL-MCNC: 99 NG/ML (ref 8–388)
FOLATE SERPL-MCNC: >20 NG/ML (ref 3.1–17.5)
GFR SERPL CREATININE-BSD FRML MDRD: 122 ML/MIN/1.73SQ M
GLUCOSE SERPL-MCNC: 141 MG/DL (ref 65–140)
HCT VFR BLD AUTO: 32.3 % (ref 36.5–49.3)
HGB BLD-MCNC: 10.3 G/DL (ref 12–17)
IRON SATN MFR SERPL: 6 %
IRON SERPL-MCNC: 24 UG/DL (ref 65–175)
LYMPHOCYTES # BLD AUTO: 1.32 THOUSANDS/ΜL (ref 0.6–4.47)
LYMPHOCYTES NFR BLD AUTO: 20 % (ref 14–44)
MCH RBC QN AUTO: 26.9 PG (ref 26.8–34.3)
MCHC RBC AUTO-ENTMCNC: 31.9 G/DL (ref 31.4–37.4)
MCV RBC AUTO: 84 FL (ref 82–98)
MONOCYTES # BLD AUTO: 0.99 THOUSAND/ΜL (ref 0.17–1.22)
MONOCYTES NFR BLD AUTO: 15 % (ref 4–12)
NEUTROPHILS # BLD AUTO: 3.85 THOUSANDS/ΜL (ref 1.85–7.62)
NEUTS SEG NFR BLD AUTO: 57 % (ref 43–75)
OSMOLALITY UR/SERPL-RTO: 266 MMOL/KG (ref 282–298)
OSMOLALITY UR: 259 MMOL/KG
PLATELET # BLD AUTO: 181 THOUSANDS/UL (ref 149–390)
PMV BLD AUTO: 10.1 FL (ref 8.9–12.7)
POTASSIUM SERPL-SCNC: 4 MMOL/L (ref 3.5–5.3)
RBC # BLD AUTO: 3.83 MILLION/UL (ref 3.88–5.62)
SODIUM 24H UR-SCNC: 87 MOL/L
SODIUM SERPL-SCNC: 129 MMOL/L (ref 136–145)
TIBC SERPL-MCNC: 412 UG/DL (ref 250–450)
TSH SERPL DL<=0.05 MIU/L-ACNC: 3.21 UIU/ML (ref 0.36–3.74)
VIT B12 SERPL-MCNC: 564 PG/ML (ref 100–900)
WBC # BLD AUTO: 6.64 THOUSAND/UL (ref 4.31–10.16)

## 2017-10-11 PROCEDURE — 83540 ASSAY OF IRON: CPT | Performed by: PHYSICIAN ASSISTANT

## 2017-10-11 PROCEDURE — 82607 VITAMIN B-12: CPT | Performed by: PHYSICIAN ASSISTANT

## 2017-10-11 PROCEDURE — 80048 BASIC METABOLIC PNL TOTAL CA: CPT | Performed by: PHYSICIAN ASSISTANT

## 2017-10-11 PROCEDURE — 83550 IRON BINDING TEST: CPT | Performed by: PHYSICIAN ASSISTANT

## 2017-10-11 PROCEDURE — 82746 ASSAY OF FOLIC ACID SERUM: CPT | Performed by: PHYSICIAN ASSISTANT

## 2017-10-11 PROCEDURE — 97116 GAIT TRAINING THERAPY: CPT

## 2017-10-11 PROCEDURE — 83930 ASSAY OF BLOOD OSMOLALITY: CPT | Performed by: PHYSICIAN ASSISTANT

## 2017-10-11 PROCEDURE — 83935 ASSAY OF URINE OSMOLALITY: CPT | Performed by: PHYSICIAN ASSISTANT

## 2017-10-11 PROCEDURE — 82728 ASSAY OF FERRITIN: CPT | Performed by: PHYSICIAN ASSISTANT

## 2017-10-11 PROCEDURE — 84443 ASSAY THYROID STIM HORMONE: CPT | Performed by: PHYSICIAN ASSISTANT

## 2017-10-11 PROCEDURE — 97530 THERAPEUTIC ACTIVITIES: CPT

## 2017-10-11 PROCEDURE — 84300 ASSAY OF URINE SODIUM: CPT | Performed by: PHYSICIAN ASSISTANT

## 2017-10-11 PROCEDURE — 85025 COMPLETE CBC W/AUTO DIFF WBC: CPT | Performed by: PHYSICIAN ASSISTANT

## 2017-10-11 PROCEDURE — 97110 THERAPEUTIC EXERCISES: CPT

## 2017-10-11 RX ORDER — HEPARIN SODIUM 5000 [USP'U]/ML
5000 INJECTION, SOLUTION INTRAVENOUS; SUBCUTANEOUS EVERY 8 HOURS SCHEDULED
Status: DISCONTINUED | OUTPATIENT
Start: 2017-10-11 | End: 2017-10-12 | Stop reason: HOSPADM

## 2017-10-11 RX ORDER — SODIUM CHLORIDE 1000 MG
1 TABLET, SOLUBLE MISCELLANEOUS
Status: DISCONTINUED | OUTPATIENT
Start: 2017-10-11 | End: 2017-10-12 | Stop reason: HOSPADM

## 2017-10-11 RX ORDER — SODIUM CHLORIDE 9 MG/ML
125 INJECTION, SOLUTION INTRAVENOUS CONTINUOUS
Status: DISCONTINUED | OUTPATIENT
Start: 2017-10-11 | End: 2017-10-11

## 2017-10-11 RX ORDER — THIAMINE MONONITRATE (VIT B1) 100 MG
100 TABLET ORAL DAILY
Status: DISCONTINUED | OUTPATIENT
Start: 2017-10-11 | End: 2017-10-12 | Stop reason: HOSPADM

## 2017-10-11 RX ADMIN — GABAPENTIN 300 MG: 300 CAPSULE ORAL at 21:10

## 2017-10-11 RX ADMIN — HEPARIN SODIUM 5000 UNITS: 5000 INJECTION, SOLUTION INTRAVENOUS; SUBCUTANEOUS at 13:39

## 2017-10-11 RX ADMIN — GABAPENTIN 300 MG: 300 CAPSULE ORAL at 16:57

## 2017-10-11 RX ADMIN — CHLORDIAZEPOXIDE HYDROCHLORIDE 25 MG: 25 CAPSULE ORAL at 09:08

## 2017-10-11 RX ADMIN — Medication 400 MG: at 17:00

## 2017-10-11 RX ADMIN — LISINOPRIL 10 MG: 10 TABLET ORAL at 09:08

## 2017-10-11 RX ADMIN — SODIUM CHLORIDE TAB 1 GM 1 G: 1 TAB at 16:57

## 2017-10-11 RX ADMIN — SODIUM CHLORIDE TAB 1 GM 1 G: 1 TAB at 13:39

## 2017-10-11 RX ADMIN — LEVETIRACETAM 1000 MG: 500 TABLET, FILM COATED ORAL at 21:10

## 2017-10-11 RX ADMIN — CHLORDIAZEPOXIDE HYDROCHLORIDE 25 MG: 25 CAPSULE ORAL at 21:10

## 2017-10-11 RX ADMIN — CHLORDIAZEPOXIDE HYDROCHLORIDE 25 MG: 25 CAPSULE ORAL at 16:57

## 2017-10-11 RX ADMIN — THIAMINE HCL TAB 100 MG 100 MG: 100 TAB at 12:04

## 2017-10-11 RX ADMIN — HEPARIN SODIUM 5000 UNITS: 5000 INJECTION, SOLUTION INTRAVENOUS; SUBCUTANEOUS at 21:10

## 2017-10-11 RX ADMIN — DEXTROSE, SODIUM CHLORIDE, AND POTASSIUM CHLORIDE 150 ML/HR: 5; .9; .15 INJECTION INTRAVENOUS at 04:17

## 2017-10-11 RX ADMIN — Medication 1 TABLET: at 12:04

## 2017-10-11 RX ADMIN — Medication 400 MG: at 09:08

## 2017-10-11 RX ADMIN — LEVETIRACETAM 1000 MG: 500 TABLET, FILM COATED ORAL at 09:08

## 2017-10-11 RX ADMIN — GABAPENTIN 300 MG: 300 CAPSULE ORAL at 09:08

## 2017-10-11 NOTE — PLAN OF CARE
Problem: Potential for Falls  Goal: Patient will remain free of falls  INTERVENTIONS:  - Assess patient frequently for physical needs  -  Identify cognitive and physical deficits and behaviors that affect risk of falls    -  [facility] fall precautions as indicated by assessment   - Educate patient/family on patient safety including physical limitations  - Instruct patient to call for assistance with activity based on assessment  - Modify environment to reduce risk of injury  - Consider OT/PT consult to assist with strengthening/mobility   Outcome: Progressing      Problem: PAIN - ADULT  Goal: Verbalizes/displays adequate comfort level or baseline comfort level  Interventions:  - Encourage patient to monitor pain and request assistance  - Assess pain using appropriate pain scale, 0-10 scale  - Administer analgesics based on type and severity of pain and evaluate response  - Implement non-pharmacological measures as appropriate and evaluate response  - Consider cultural and social influences on pain and pain management  - Notify physician/advanced practitioner if interventions unsuccessful or patient reports new pain    Outcome: Progressing      Problem: INFECTION - ADULT  Goal: Absence or prevention of progression during hospitalization  INTERVENTIONS:  - Assess and monitor for signs and symptoms of infection  - Monitor lab/diagnostic results  - Monitor all insertion sites, i e  indwelling lines, tubes, and drains  - [facility] appropriate cooling/warming therapies per order  - Administer medications as ordered  - Instruct and encourage patient and family to use good hand hygiene technique  - Identify and instruct in appropriate isolation precautions for identified infection/condition    Outcome: Progressing      Problem: SAFETY ADULT  Goal: Maintain or return to baseline ADL function  INTERVENTIONS:  -  Assess patient's ability to carry out ADLs; assess patient's baseline for ADL function and identify physical deficits which impact ability to perform ADLs (bathing, care of mouth/teeth, toileting, grooming, dressing, etc )  - Assess/evaluate cause of self-care deficits   - Assess range of motion  - Assess patient's mobility; develop plan if impaired  - Assess patient's need for assistive devices and provide as appropriate  - Encourage maximum independence but intervene and supervise when necessary  ¯ Involve family in performance of ADLs  ¯ Assess for home care needs following discharge   ¯ Request OT consult to assist with ADL evaluation and planning for discharge  ¯ Provide patient education as appropriate   Outcome: Progressing    Goal: Maintain or return mobility status to optimal level  INTERVENTIONS:  - Assess patient's baseline mobility status (ambulation, transfers, stairs, etc )    - Identify cognitive and physical deficits and behaviors that affect mobility  - Identify mobility aids required to assist with transfers and/or ambulation (gait belt, sit-to-stand, lift, walker, cane, etc )  - [place] fall precautions as indicated by assessment  - Record patient progress and toleration of activity level on Mobility SBAR; progress patient to next Phase/Stage  - Instruct patient to call for assistance with activity based on assessment  - Request Rehabilitation consult to assist with strengthening/weightbearing, etc    Outcome: Progressing      Problem: DISCHARGE PLANNING  Goal: Discharge to home or other facility with appropriate resources  INTERVENTIONS:  - Identify barriers to discharge w/patient and caregiver  - Arrange for needed discharge resources and transportation as appropriate  - Identify discharge learning needs (meds, wound care, etc )  - Arrange for interpretive services to assist at discharge as needed  - Refer to Case Management Department for coordinating discharge planning if the patient needs post-hospital services based on physician/advanced practitioner order or complex needs related to functional status, cognitive ability, or social support system   Outcome: Progressing      Problem: Knowledge Deficit  Goal: Patient/family/caregiver demonstrates understanding of disease process, treatment plan, medications, and discharge instructions  Complete learning assessment and assess knowledge base    Interventions:  - Provide teaching at level of understanding  - Provide teaching via preferred learning methods   Outcome: Progressing      Problem: SUBSTANCE USE/ABUSE  Goal: Will have no detox symptoms and will verbalize plan for changing substance-related behavior  INTERVENTIONS:  - Monitor physical status and assess for symptoms of withdrawal  - Administer medication as ordered  - Provide emotional support with 1 on 1 interaction with staff  - Encourage recovery focused program/ addiction education  - Assess for verbalization of changing behaviors related to substance abuse  - Initiate consults and referrals as appropriate (Case Management, Spiritual Care, etc )   Outcome: Progressing      Problem: DISCHARGE PLANNING - CARE MANAGEMENT  Goal: Discharge to post-acute care or home with appropriate resources  INTERVENTIONS:  - Conduct assessment to determine patient/family and health care team treatment goals, and need for post-acute services based on payer coverage, community resources, and patient preferences, and barriers to discharge  - Address psychosocial, clinical, and financial barriers to discharge as identified in assessment in conjunction with the patient/family and health care team  - Arrange appropriate level of post-acute services according to patient's   needs and preference and payer coverage in collaboration with the physician and health care team  - Communicate with and update the patient/family, physician, and health care team regarding progress on the discharge plan  - Arrange appropriate transportation to post-acute venues   Outcome: Progressing      Problem: Prexisting or High Potential for Compromised Skin Integrity  Goal: Skin integrity is maintained or improved  INTERVENTIONS:  - Identify patients at risk for skin breakdown  - Assess and monitor skin integrity  - Assess and monitor nutrition and hydration status  - Monitor labs (i e  albumin)  - Assess for incontinence   - Turn and reposition patient  - Assist with mobility/ambulation  - Relieve pressure over bony prominences  - Avoid friction and shearing  - Provide appropriate hygiene as needed including keeping skin clean and dry  - Evaluate need for skin moisturizer/barrier cream  - Collaborate with interdisciplinary team (i e  Nutrition, Rehabilitation, etc )   - Patient/family teaching   Outcome: Progressing

## 2017-10-11 NOTE — OCCUPATIONAL THERAPY NOTE
Occupational Therapy Progress Note      Patient Name: Ronnie Baker    APYDG'ANGEL Date: 10/11/2017    Problem List:   Patient Active Problem List   Diagnosis    Alcohol abuse    Tobacco use    Essential hypertension    COPD (chronic obstructive pulmonary disease) ([address] )    H/O suicide attempt    H/O cervical spine surgery    Cholelithiasis    Hyponatremia    Anemia    Hepatomegaly    Hepatic steatosis    Hypomagnesemia    Elevated alkaline phosphatase level    Transaminitis    Vitamin D deficiency    Iron deficiency    Alcohol withdrawal syndrome (HCC)    Hypophosphatemia    Chronic abdominal wound infection    Generalized weakness    Dysarthria    Expressive aphasia    Dehydration    Body mass index (BMI) 21 0-21 9, adult    Malnutrition of moderate degree (HCC)    Closed fracture of multiple thoracic vertebrae (HCC)    Cervical strain    Weakness of left upper extremity    Hyponatremia    Hypokalemia    Syncope    Encephalopathy    [facility] [facility]    Continuous chronic [address]ism ([facility] [facility]    Incisional hernia                10/11/17 1451   Restrictions/Precautions   Other Precautions Fall Risk;Fluid restriction   Pain Assessment   Pain Assessment 0-10   Pain Score 8   Pain Location Foot   Pain Orientation Bilateral   Transfers   Sit to Stand 6  Modified independent   Additional items (RW)   Stand to Sit 6  Modified independent   Additional items (RW)   Functional Mobility   Functional Mobility 6  Modified independent   Additional Comments Pt completed functional mobility within hallway [address] ft, with no LOB or SOB noted  Additional items Rolling walker   Therapeutic Exercise - ROM   UE-ROM Yes   Therapeutic Excerise-Strength   UE Strength Yes   Cognition   Orientation Level Oriented X4   Activity Tolerance   Activity Tolerance Patient tolerated treatment well   Assessment   Assessment Pt presents seated in bedside chair, curled up, with feet in chair as well   Pt agreeable to complete OT at this time including functional mobility as well as TE  Pt finished session seated in chair  Pt cooperative and agreeable to complete OT this pm including TE consisting of: 2# pronation/supination with FW, and 2# therapy bar curls, chest press, and shoulder press for 2 x 10  Pt completed 1 set of 10 shoulder press with bilateral UEs and 1 x 10 of chest press with only L arm  R arm pt states has plates and does not have as much ROM  Pt finished session with all needs in reach, lines in tact, and SCDs powered on and applied

## 2017-10-11 NOTE — PHYSICAL THERAPY NOTE
PT treatment Note      10/11/17 0929   Pain Assessment   Pain Score No Pain   Restrictions/Precautions   Other Precautions (Seizure;Multiple lines;Telemetry; Fall Risk;Pain)   Subjective   Subjective Reports he is feeling better   Bed Mobility   Additional Comments OOB in chair at start of PT session   Transfers   Sit to Stand 5  Supervision   Additional items Verbal cues   Stand to Sit 5  Supervision   Additional items Verbal cues   Stand pivot 5  Supervision   Ambulation/Elevation   Gait pattern Narrow MCKENZIE   Gait Assistance (CGA)   Additional items Assist x 1   Assistive Device Rolling walker   Distance 250' x 2   Balance   Static Sitting Good   Dynamic Sitting Good   Static Standing Fair   Dynamic Standing Fair   Ambulatory Fair  (with RW)   Endurance Deficit   Endurance Deficit Yes   Activity Tolerance   Activity Tolerance Patient limited by fatigue   Assessment   Prognosis Good   Problem List Decreased strength;Decreased endurance; Impaired balance;Decreased mobility   Assessment Pt  performing functional mobility at (S- CGA) x 1 level of function  Slow unsteady gait  Improved balance and stability  Increased gait speed  Pt is in need of continued activity in PT to improve impairments and functional deficits  Anticipate when medically stable pt with be safe to return home but may benefit from Home health care services  Plan   Treatment/Interventions Functional transfer training;LE strengthening/ROM; Therapeutic exercise; Endurance training;Bed mobility;Gait training   Progress Progressing toward goals   Recommendation   Recommendation Home PT   Pt  OOB with call bell within reach, scd's connected and turned on and alarm on at end of PT session

## 2017-10-11 NOTE — PROGRESS NOTES
Progress Note - Yung Bryson 47 y o  male MRN: [id number]  Unit/Bed#: 219-62 Encounter: [id number]      Assessment:  Patient Active Problem List   Diagnosis    Alcohol abuse    Tobacco use    Essential hypertension    [facility] obstructive [facility]) [facility] [facility]    H/O suicide attempt    H/O cervical spine surgery    Cholelithiasis    Hyponatremia    Anemia    Hepatomegaly    Hepatic steatosis    Hypomagnesemia    Elevated alkaline phosphatase level    Transaminitis    Vitamin D deficiency    Iron deficiency    Alcohol withdrawal syndrome (HCC)    Hypophosphatemia    Chronic abdominal wound infection    Generalized weakness    Dysarthria    Expressive aphasia    Dehydration    Body mass index (BMI) 21 0-21 9, adult    Malnutrition of moderate degree (HCC)    Closed fracture of multiple thoracic vertebrae (HCC)    Cervical strain    Weakness of left upper extremity    Hyponatremia    Hypokalemia    Syncope    Encephalopathy    Seizure ([facility] [facility]    Continuous chronic alcoholism ([facility]    Incisional hernia         Plan:  1) Hyponatremia - after further discussion it appears the patient is supposed to be taking salt tablets 1g TID  Will add this medication back and discontinue IV fluids, also place patient on fluid restrictions  2) Seizure - Keppra increased, will follow up with neurology  3) Alcohol withdrawal syndrome - appears improved today  Ativan discontinued  Would recommend discontinuing librium at this time  Although patient states he would like to quit drinking, he has not had success in the past    4) Anemia - check iron, B12, folate  Subjective:   Patient seen and examined  He is feeling well  Nursing notes he is drinking an excessive amount of water  PT recommends Home PT  Objective:   Vitals: Blood pressure 154/97, pulse 81, temperature 98 1 °F (36 7 °C), temperature source Temporal, resp   rate 16, height 5' 5" (1 651 m), weight 64 9 kg (143 lb 1 3 oz), SpO2 100 % ,Body mass index is 23 81 kg/m²  Weight (last 2 days)     Date/Time   Weight    10/11/17 0350  64 9 (143 08)    10/09/17 0600  61 8 (136 24)              Intake/Output Summary (Last 24 hours) at 10/11/17 1132  Last data filed at 10/11/17 0952   Gross per 24 hour   Intake           6443 5 ml   Output             5100 ml   Net           1343 5 ml       Physical Exam:   General:  NAD, awake, alert, oriented x 3  HEENT:  NC/AT, mucous membranes moist  Neck:  Supple, No JVP elevation  CV:  + S1, +S2, RRR  Pulm:  Lung fields are CTA bilaterally  Abd:  Soft, Non-tender, Non-distended  Ext:  No clubbing/cyanosis/edema  Neuro: no tremors  Scheduled Meds:  chlordiazePOXIDE 25 mg Oral TID   gabapentin 300 mg Oral TID   levETIRAcetam 1,000 mg Oral Q12H [address]   lisinopril 10 mg Oral Daily   magnesium oxide 400 mg Oral BID   multivitamin-minerals 1 tablet Oral Daily   thiamine 100 mg Oral Daily     Continuous Infusions:   PRN Meds:   acetaminophen    albuterol    cloNIDine    HYDROcodone-acetaminophen    LORazepam      Invasive Devices     Peripheral Intravenous Line            Peripheral IV 10/10/17 Right;Dorsal (posterior) Forearm 1 day                  Results from last 7 days  Lab Units 10/11/17  0930 10/10/17  0514 10/09/17  0517  10/05/17  1700   WBC Thousand/uL 6 64 5 93 7 02  < > 6 12   HEMOGLOBIN g/dL 10 3* 10 2* 10 2*  < > 11 4*   HEMATOCRIT % 32 3* 31 9* 31 6*  < > 34 2*   PLATELETS Thousands/uL 181 150 136*  < > 174   NEUTROS PCT % 57  --   --   --  36*   MONOS PCT % 15*  --   --   --  10   < > = values in this interval not displayed        Results from last 7 days  Lab Units 10/11/17  0930 10/10/17  0514 10/09/17  0517  10/08/17  0424   SODIUM mmol/L 129* 132* 131*  < > 129*   POTASSIUM mmol/L 4 0 3 7 3 6  < > 3 8   CHLORIDE mmol/L 94* 96* 96*  < > 94*   CO2 mmol/L 27 26 26  < > 30   BUN mg/dL 3* 5 3*  < > 4*   CREATININE mg/dL 0 54* 0 44* 0 46*  < > 0 44*   CALCIUM mg/dL 9 2 9 4 8 9  < > 9 1   TOTAL PROTEIN g/dL  --  7 2 7 2  --  7 5   BILIRUBIN TOTAL mg/dL  --  0 40 0 50  --  0 70   ALK PHOS U/L  --  211* 192*  --  207*   ALT U/L  --  44 36  --  36   AST U/L  --  76* 76*  --  85*   GLUCOSE RANDOM mg/dL 141* 92 99  < > 98   < > = values in this interval not displayed  Lab Results   Component Value Date    CKTOTAL 161 10/05/2017    CKMBINDEX 2 5 10/05/2017    TROPONINI 0 04 07/29/2017       Lab Results   Component Value Date    INR 1 07 10/08/2017    INR 1 03 10/06/2017    INR 1 04 10/05/2017    PROTIME 13 8 10/08/2017    PROTIME 13 4 10/06/2017    PROTIME 13 5 10/05/2017               Lab, Imaging and other studies: I have personally reviewed pertinent reports      VTE Pharmacologic Prophylaxis: Heparin  VTE Mechanical Prophylaxis: sequential compression device

## 2017-10-12 VITALS
BODY MASS INDEX: 23.32 KG/M2 | DIASTOLIC BLOOD PRESSURE: 93 MMHG | TEMPERATURE: 98.1 F | HEIGHT: 65 IN | SYSTOLIC BLOOD PRESSURE: 148 MMHG | WEIGHT: 139.99 LBS | OXYGEN SATURATION: 97 % | RESPIRATION RATE: 16 BRPM | HEART RATE: 76 BPM

## 2017-10-12 PROBLEM — E86.0 DEHYDRATION: Status: RESOLVED | Noted: 2017-05-18 | Resolved: 2017-10-12

## 2017-10-12 PROBLEM — E83.39 HYPOPHOSPHATEMIA: Status: RESOLVED | Noted: 2017-03-09 | Resolved: 2017-10-12

## 2017-10-12 PROBLEM — F10.239 ALCOHOL WITHDRAWAL SYNDROME (HCC): Status: RESOLVED | Noted: 2017-03-07 | Resolved: 2017-10-12

## 2017-10-12 PROBLEM — E87.1 HYPONATREMIA: Status: RESOLVED | Noted: 2017-07-29 | Resolved: 2017-10-12

## 2017-10-12 PROBLEM — S16.1XXA CERVICAL STRAIN: Status: RESOLVED | Noted: 2017-07-29 | Resolved: 2017-10-12

## 2017-10-12 PROBLEM — E87.6 HYPOKALEMIA: Status: RESOLVED | Noted: 2017-07-29 | Resolved: 2017-10-12

## 2017-10-12 PROBLEM — R55 SYNCOPE: Status: RESOLVED | Noted: 2017-07-29 | Resolved: 2017-10-12

## 2017-10-12 PROBLEM — Z86.69 HX OF SEIZURE DISORDER: Status: ACTIVE | Noted: 2017-10-12

## 2017-10-12 PROBLEM — R47.1 DYSARTHRIA: Status: RESOLVED | Noted: 2017-05-18 | Resolved: 2017-10-12

## 2017-10-12 PROBLEM — S22.009A CLOSED FRACTURE OF MULTIPLE THORACIC VERTEBRAE (HCC): Status: RESOLVED | Noted: 2017-07-29 | Resolved: 2017-10-12

## 2017-10-12 PROBLEM — L08.9 CHRONIC ABDOMINAL WOUND INFECTION: Status: RESOLVED | Noted: 2017-05-17 | Resolved: 2017-10-12

## 2017-10-12 PROBLEM — F10.939 ALCOHOL WITHDRAWAL SYNDROME (HCC): Status: RESOLVED | Noted: 2017-03-07 | Resolved: 2017-10-12

## 2017-10-12 PROBLEM — R29.898 WEAKNESS OF LEFT UPPER EXTREMITY: Status: RESOLVED | Noted: 2017-07-29 | Resolved: 2017-10-12

## 2017-10-12 PROBLEM — G93.40 ENCEPHALOPATHY: Status: RESOLVED | Noted: 2017-07-29 | Resolved: 2017-10-12

## 2017-10-12 PROBLEM — S31.109A CHRONIC ABDOMINAL WOUND INFECTION: Status: RESOLVED | Noted: 2017-05-17 | Resolved: 2017-10-12

## 2017-10-12 PROBLEM — R47.01 EXPRESSIVE APHASIA: Status: RESOLVED | Noted: 2017-05-18 | Resolved: 2017-10-12

## 2017-10-12 LAB
ANION GAP SERPL CALCULATED.3IONS-SCNC: 8 MMOL/L (ref 4–13)
BASOPHILS # BLD AUTO: 0.06 THOUSANDS/ΜL (ref 0–0.1)
BASOPHILS NFR BLD AUTO: 1 % (ref 0–1)
BUN SERPL-MCNC: 3 MG/DL (ref 5–25)
CALCIUM SERPL-MCNC: 9.4 MG/DL (ref 8.3–10.1)
CHLORIDE SERPL-SCNC: 95 MMOL/L (ref 100–108)
CO2 SERPL-SCNC: 29 MMOL/L (ref 21–32)
CREAT SERPL-MCNC: 0.5 MG/DL (ref 0.6–1.3)
EOSINOPHIL # BLD AUTO: 0.51 THOUSAND/ΜL (ref 0–0.61)
EOSINOPHIL NFR BLD AUTO: 8 % (ref 0–6)
ERYTHROCYTE [DISTWIDTH] IN BLOOD BY AUTOMATED COUNT: 21.6 % (ref 11.6–15.1)
GFR SERPL CREATININE-BSD FRML MDRD: 125 ML/MIN/1.73SQ M
GLUCOSE SERPL-MCNC: 90 MG/DL (ref 65–140)
HCT VFR BLD AUTO: 32.2 % (ref 36.5–49.3)
HGB BLD-MCNC: 10.4 G/DL (ref 12–17)
LYMPHOCYTES # BLD AUTO: 1.57 THOUSANDS/ΜL (ref 0.6–4.47)
LYMPHOCYTES NFR BLD AUTO: 25 % (ref 14–44)
MCH RBC QN AUTO: 26.9 PG (ref 26.8–34.3)
MCHC RBC AUTO-ENTMCNC: 32.3 G/DL (ref 31.4–37.4)
MCV RBC AUTO: 83 FL (ref 82–98)
MONOCYTES # BLD AUTO: 1.18 THOUSAND/ΜL (ref 0.17–1.22)
MONOCYTES NFR BLD AUTO: 19 % (ref 4–12)
NEUTROPHILS # BLD AUTO: 2.85 THOUSANDS/ΜL (ref 1.85–7.62)
NEUTS SEG NFR BLD AUTO: 47 % (ref 43–75)
PLATELET # BLD AUTO: 191 THOUSANDS/UL (ref 149–390)
PMV BLD AUTO: 9.8 FL (ref 8.9–12.7)
POTASSIUM SERPL-SCNC: 3.8 MMOL/L (ref 3.5–5.3)
RBC # BLD AUTO: 3.87 MILLION/UL (ref 3.88–5.62)
SODIUM SERPL-SCNC: 132 MMOL/L (ref 136–145)
WBC # BLD AUTO: 6.17 THOUSAND/UL (ref 4.31–10.16)

## 2017-10-12 PROCEDURE — 85025 COMPLETE CBC W/AUTO DIFF WBC: CPT | Performed by: PHYSICIAN ASSISTANT

## 2017-10-12 PROCEDURE — 80048 BASIC METABOLIC PNL TOTAL CA: CPT | Performed by: PHYSICIAN ASSISTANT

## 2017-10-12 PROCEDURE — 97116 GAIT TRAINING THERAPY: CPT

## 2017-10-12 RX ORDER — LEVETIRACETAM 1000 MG/1
1000 TABLET ORAL EVERY 12 HOURS SCHEDULED
Qty: 60 TABLET | Refills: 0 | Status: SHIPPED | OUTPATIENT
Start: 2017-10-12 | End: 2018-02-13 | Stop reason: SDUPTHER

## 2017-10-12 RX ADMIN — GABAPENTIN 300 MG: 300 CAPSULE ORAL at 08:59

## 2017-10-12 RX ADMIN — LEVETIRACETAM 1000 MG: 500 TABLET, FILM COATED ORAL at 08:59

## 2017-10-12 RX ADMIN — CHLORDIAZEPOXIDE HYDROCHLORIDE 25 MG: 25 CAPSULE ORAL at 08:59

## 2017-10-12 RX ADMIN — HEPARIN SODIUM 5000 UNITS: 5000 INJECTION, SOLUTION INTRAVENOUS; SUBCUTANEOUS at 08:38

## 2017-10-12 RX ADMIN — Medication 1 TABLET: at 08:59

## 2017-10-12 RX ADMIN — Medication 400 MG: at 08:59

## 2017-10-12 RX ADMIN — SODIUM CHLORIDE TAB 1 GM 1 G: 1 TAB at 08:59

## 2017-10-12 RX ADMIN — SODIUM CHLORIDE TAB 1 GM 1 G: 1 TAB at 11:44

## 2017-10-12 RX ADMIN — THIAMINE HCL TAB 100 MG 100 MG: 100 TAB at 08:59

## 2017-10-12 RX ADMIN — LISINOPRIL 10 MG: 10 TABLET ORAL at 08:59

## 2017-10-12 NOTE — PLAN OF CARE
Problem: Potential for Falls  Goal: Patient will remain free of falls  INTERVENTIONS:  - Assess patient frequently for physical needs  -  Identify cognitive and physical deficits and behaviors that affect risk of falls    -  [place] fall precautions as indicated by assessment   - Educate patient/family on patient safety including physical limitations  - Instruct patient to call for assistance with activity based on assessment  - Modify environment to reduce risk of injury  - Consider OT/PT consult to assist with strengthening/mobility   Outcome: Adequate for Discharge      Problem: PAIN - ADULT  Goal: Verbalizes/displays adequate comfort level or baseline comfort level  Interventions:  - Encourage patient to monitor pain and request assistance  - Assess pain using appropriate pain scale, 0-10 scale  - Administer analgesics based on type and severity of pain and evaluate response  - Implement non-pharmacological measures as appropriate and evaluate response  - Consider cultural and social influences on pain and pain management  - Notify physician/advanced practitioner if interventions unsuccessful or patient reports new pain    Outcome: Adequate for Discharge      Problem: INFECTION - ADULT  Goal: Absence or prevention of progression during hospitalization  INTERVENTIONS:  - Assess and monitor for signs and symptoms of infection  - Monitor lab/diagnostic results  - Monitor all insertion sites, i e  indwelling lines, tubes, and drains  - [place] appropriate cooling/warming therapies per order  - Administer medications as ordered  - Instruct and encourage patient and family to use good hand hygiene technique  - Identify and instruct in appropriate isolation precautions for identified infection/condition    Outcome: Adequate for Discharge      Problem: SAFETY ADULT  Goal: Maintain or return to baseline ADL function  INTERVENTIONS:  -  Assess patient's ability to carry out ADLs; assess patient's baseline for ADL function and identify physical deficits which impact ability to perform ADLs (bathing, care of mouth/teeth, toileting, grooming, dressing, etc )  - Assess/evaluate cause of self-care deficits   - Assess range of motion  - Assess patient's mobility; develop plan if impaired  - Assess patient's need for assistive devices and provide as appropriate  - Encourage maximum independence but intervene and supervise when necessary  ¯ Involve family in performance of ADLs  ¯ Assess for home care needs following discharge   ¯ Request OT consult to assist with ADL evaluation and planning for discharge  ¯ Provide patient education as appropriate   Outcome: Adequate for Discharge    Goal: Maintain or return mobility status to optimal level  INTERVENTIONS:  - Assess patient's baseline mobility status (ambulation, transfers, stairs, etc )    - Identify cognitive and physical deficits and behaviors that affect mobility  - Identify mobility aids required to assist with transfers and/or ambulation (gait belt, sit-to-stand, lift, walker, cane, etc )  - [place] fall precautions as indicated by assessment  - Record patient progress and toleration of activity level on Mobility SBAR; progress patient to next Phase/Stage  - Instruct patient to call for assistance with activity based on assessment  - Request Rehabilitation consult to assist with strengthening/weightbearing, etc    Outcome: Adequate for Discharge      Problem: DISCHARGE PLANNING  Goal: Discharge to home or other facility with appropriate resources  INTERVENTIONS:  - Identify barriers to discharge w/patient and caregiver  - Arrange for needed discharge resources and transportation as appropriate  - Identify discharge learning needs (meds, wound care, etc )  - Arrange for interpretive services to assist at discharge as needed  - Refer to Case Management Department for coordinating discharge planning if the patient needs post-hospital services based on physician/advanced practitioner order or complex needs related to functional status, cognitive ability, or social support system   Outcome: Adequate for Discharge      Problem: Knowledge Deficit  Goal: Patient/family/caregiver demonstrates understanding of disease process, treatment plan, medications, and discharge instructions  Complete learning assessment and assess knowledge base    Interventions:  - Provide teaching at level of understanding  - Provide teaching via preferred learning methods   Outcome: Adequate for Discharge      Problem: SUBSTANCE USE/ABUSE  Goal: Will have no detox symptoms and will verbalize plan for changing substance-related behavior  INTERVENTIONS:  - Monitor physical status and assess for symptoms of withdrawal  - Administer medication as ordered  - Provide emotional support with 1 on 1 interaction with staff  - Encourage recovery focused program/ addiction education  - Assess for verbalization of changing behaviors related to substance abuse  - Initiate consults and referrals as appropriate (Case Management, Spiritual Care, etc )   Outcome: Adequate for Discharge      Problem: DISCHARGE PLANNING - CARE MANAGEMENT  Goal: Discharge to post-acute care or home with appropriate resources  INTERVENTIONS:  - Conduct assessment to determine patient/family and health care team treatment goals, and need for post-acute services based on payer coverage, community resources, and patient preferences, and barriers to discharge  - Address psychosocial, clinical, and financial barriers to discharge as identified in assessment in conjunction with the patient/family and health care team  - Arrange appropriate level of post-acute services according to patient's   needs and preference and payer coverage in collaboration with the physician and health care team  - Communicate with and update the patient/family, physician, and health care team regarding progress on the discharge plan  - Arrange appropriate transportation to post-acute venues   Outcome: Adequate for Discharge      Problem: Prexisting or High Potential for Compromised Skin Integrity  Goal: Skin integrity is maintained or improved  INTERVENTIONS:  - Identify patients at risk for skin breakdown  - Assess and monitor skin integrity  - Assess and monitor nutrition and hydration status  - Monitor labs (i e  albumin)  - Assess for incontinence   - Turn and reposition patient  - Assist with mobility/ambulation  - Relieve pressure over bony prominences  - Avoid friction and shearing  - Provide appropriate hygiene as needed including keeping skin clean and dry  - Evaluate need for skin moisturizer/barrier cream  - Collaborate with interdisciplinary team (i e  Nutrition, Rehabilitation, etc )   - Patient/family teaching   Outcome: Adequate for Discharge

## 2017-10-12 NOTE — SOCIAL WORK
Discussed with patient preferences on discharge;understanding how to manage health at home; purpose of taking medications; importance of follow up care/appointments; and symptoms to watch out for once discharged home  Pt provided with information/phone number for [facility] and CM made pt's post dc follow up appointment with his Quyen Agustin and re: neurology appointment, the hospital liason will be calling pt within two weeks with an appointment  Homehealth care/home PT also arranged  [facility]  Pt's brother will be over to pick pt up to take him home

## 2017-10-12 NOTE — DISCHARGE SUMMARY
Discharge Summary - Krish Beltran 47 y o  male MRN: [id number]  Unit/Bed#: 997-22 Encounter: [id number]    Admission Date: 10/5/2017   Discharge date: 10/12/17    Primary admission diagnosis: Seizure    Admitting Diagnosis: COPD (chronic obstructive pulmonary disease) ([place] [address] ) [J44 9]  Hyponatremia [E87 1]  Seizure ([place] [address] ) [R56 9]  Transaminitis [R74 0]  Alcohol intoxication in active alcoholic ([place] [address] ) [A48 108]  Hx of seizure disorder [Z86 69]     Discharge diagnosis:   Patient Active Problem List   Diagnosis    Alcohol abuse    Tobacco use    Essential hypertension    COPD (chronic obstructive pulmonary disease) ([place] [address] )    H/O suicide attempt    H/O cervical spine surgery    Cholelithiasis    Hyponatremia    Anemia    Hepatomegaly    Hepatic steatosis    Hypomagnesemia    Elevated alkaline phosphatase level    Transaminitis    Vitamin D deficiency    Iron deficiency    Generalized weakness    Body mass index (BMI) 21 0-21 9, adult    Malnutrition of moderate degree (HCC)    Seizure (HCC)    Continuous chronic alcoholism ([place] [address] )    Incisional hernia    Hx of seizure disorder       HPI: This patient is a 47year old alcoholic who presented to the ER after experiencing a seizure at home  Alcohol level on admission was 398  Hospital Course: The patient was admitted and placed on seizure precautions, given IV fluid hydration  IV ativan was available as needed for further seizure activity, which he did not experience  Keppra dose was increased to 1000mg BID per tele-neurology  The patient did suffer alcohol withdrawal symptoms while in the hospital  He was given round the clock librium and ativan as needed for alcohol withdrawal syndrome  Thiamine and folate supplementation was continued  He was also given an IV banana bag for several days       Mr Rogelio Del Castillo also developed abdominal pain and distention during his stay, and a CT of the abdomen and pelvis was ordered to rule out incarcerated ventral hernia, this was negative but did show some chronic findings, such as a calcification of the pancreatic head as well as bladder wall thickening, both of which will need outpatient follow up  Furthermore, his sodium decreased throughout his stay to 129  After discontinuing IV fluid hydration and adding back his salt tablets, this improved on the date of discharge to 133  Of note, he was also found to have severe hypomagnesemia, for which both IV and PO supplementation was given  He will be prescribed magnesium oxide 400mg BID at discharge  Overall, after a 7 day hospital stay he was stable for discharge home  Home PT is recommended for this patient  Although I advised alcohol cessation, and we suggested drug and alcohol rehab for this patient, he currently wishes to go home and cut back his alcohol intake  He does not have any interest in stopping alcohol use altogether  He will need close follow up with his PCP, within 5 days after discharge  Imaging studies:   CT head (10/5/17): No acute intracranial hemorrhage seen  No mass effect or midline shift seen   Moderate to severe mucosal thickening seen in the left maxillary sinus, was not seen on the previous study     CT abdomen and pelvis with contrast (10/8/17): Again seen is a punctate calcification in the pancreatic head suspicious for common bile duct stone  There is no associated biliary ductal dilatation  (Series 2 image 31 )   Cholelithiasis without CT evidence of acute cholecystitis    Again seen is diffuse urinary bladder wall thickening, which could be cystitis  Please correlate with urinalysis      Condition at Discharge: fair     Examination on discharge:   Vitals:    10/11/17 1605 10/12/17 0027 10/12/17 0614 10/12/17 0722   BP: 135/83 111/77  148/93   Pulse: 85 88  76   Resp: 18 18  16   Temp: 98 4 °F (36 9 °C) 98 4 °F (36 9 °C)  98 1 °F (36 7 °C)   TempSrc: Temporal Temporal  Temporal   SpO2: 98% 97%  97%   Weight:   63 5 kg (139 lb 15 9 oz)    Height:       Gen: NAD  Skin: warm and dry, no jaundice  HEENT:   Lungs: CTA throughout  Heart: RRR  Ext: no edema of the BLE  Abd: distention present with ventral abdominal hernia which is partially reducible  No tenderness to palpation  Neuro: no tremors    Discharge instructions/Information to patient and family:   See after visit summary for information provided to patient and family  Provisions for Follow-Up Care:  See after visit summary for information related to follow-up care and any pertinent home health orders  Disposition: Home    Planned Readmission: No    Discharge Statement   I spent 35 minutes discharging the patient  This time was spent on the day of discharge  I examined the patient, reviewed his medical record, and provided patient education  Discharge Medications:  See after visit summary for reconciled discharge medications provided to patient and family

## 2017-10-12 NOTE — PLAN OF CARE
Problem: DISCHARGE PLANNING - CARE MANAGEMENT  Goal: Discharge to post-acute care or home with appropriate resources  INTERVENTIONS:  - Conduct assessment to determine patient/family and health care team treatment goals, and need for post-acute services based on payer coverage, community resources, and patient preferences, and barriers to discharge  - Address psychosocial, clinical, and financial barriers to discharge as identified in assessment in conjunction with the patient/family and health care team  - Arrange appropriate level of post-acute services according to patient's   needs and preference and payer coverage in collaboration with the physician and health care team  - Communicate with and update the patient/family, physician, and health care team regarding progress on the discharge plan  - Arrange appropriate transportation to post-acute venues   Outcome: Completed Date Met: 10/12/17  -MA home with outpatient follow up appointments with neurology and PCP  -information/phone number [facility]  -brother to transport pt home  -homehealth care arranged([facility]

## 2017-10-12 NOTE — DISCHARGE INSTR - APPOINTMENTS
Janiya Stack Thursday Oct  19th at 1:30pm    Neurology office will be calling you with an appointment    phone number for [facility]  [phone]

## 2017-10-12 NOTE — PHYSICAL THERAPY NOTE
PT treatment Note      10/12/17 1049   Pain Assessment   Pain Score No Pain   Restrictions/Precautions   Other Precautions Chair Alarm;Fluid restriction   Bed Mobility   Additional Comments OOB in chair at start of PT session   Transfers   Sit to Stand 5  Supervision   Additional items Armrests   Stand to Sit 5  Supervision   Additional items Armrests   Stand pivot 5  Supervision   Ambulation/Elevation   Gait pattern Narrow MCKENZIE   Gait Assistance 5  Supervision   Additional items Assist x 1   Assistive Device Rolling walker   Distance 250' x 2   Balance   Static Sitting Good   Dynamic Sitting Good   Static Standing Fair   Dynamic Standing Fair   Ambulatory Fair  (with RW)   Endurance Deficit   Endurance Deficit Yes   Activity Tolerance   Activity Tolerance Patient tolerated treatment well   Assessment   Prognosis Good   Problem List Decreased strength;Decreased endurance; Impaired balance;Decreased mobility   Assessment Pt  performing functional mobility at (S) x 1 level of function  Improved balance and stability  No LOB  Pt is in need of continued activity in PT to improve impairments and functional deficits  Anticipate when medically stable pt with be safe to return home but may benefit from Home health care services  Plan   Treatment/Interventions Functional transfer training;LE strengthening/ROM; Therapeutic exercise; Endurance training;Bed mobility;Gait training   Progress Progressing toward goals   Recommendation   Recommendation Home PT   Pt  OOB with call bell within reach, scd's connected and turned on and alarm on at end of PT session

## 2017-10-18 ENCOUNTER — ALLSCRIPTS OFFICE VISIT (OUTPATIENT)
Dept: OTHER | Facility: OTHER | Age: 52
End: 2017-10-18

## 2017-10-18 ENCOUNTER — APPOINTMENT (OUTPATIENT)
Dept: LAB | Facility: MEDICAL CENTER | Age: 52
End: 2017-10-18
Payer: COMMERCIAL

## 2017-10-18 ENCOUNTER — TRANSCRIBE ORDERS (OUTPATIENT)
Dept: RADIOLOGY | Facility: MEDICAL CENTER | Age: 52
End: 2017-10-18

## 2017-10-18 DIAGNOSIS — E87.1 HYPO-OSMOLALITY AND HYPONATREMIA (CODE): ICD-10-CM

## 2017-10-18 DIAGNOSIS — E83.42 HYPOMAGNESEMIA: ICD-10-CM

## 2017-10-18 DIAGNOSIS — G40.909 EPILEPSY WITHOUT STATUS EPILEPTICUS, NOT INTRACTABLE (HCC): ICD-10-CM

## 2017-10-18 LAB
ANION GAP SERPL CALCULATED.3IONS-SCNC: 6 MMOL/L (ref 4–13)
BUN SERPL-MCNC: 7 MG/DL (ref 5–25)
CALCIUM SERPL-MCNC: 9 MG/DL (ref 8.3–10.1)
CHLORIDE SERPL-SCNC: 99 MMOL/L (ref 100–108)
CO2 SERPL-SCNC: 28 MMOL/L (ref 21–32)
CREAT SERPL-MCNC: 0.51 MG/DL (ref 0.6–1.3)
GFR SERPL CREATININE-BSD FRML MDRD: 124 ML/MIN/1.73SQ M
GLUCOSE P FAST SERPL-MCNC: 81 MG/DL (ref 65–99)
POTASSIUM SERPL-SCNC: 4.8 MMOL/L (ref 3.5–5.3)
SODIUM SERPL-SCNC: 133 MMOL/L (ref 136–145)

## 2017-10-18 PROCEDURE — 80048 BASIC METABOLIC PNL TOTAL CA: CPT

## 2017-10-19 NOTE — PROGRESS NOTES
Assessment  1  Hyponatremia (276 1) (E87 1)   2  Alcoholism (303 90) (F10 20)   3  Essential hypertension (401 9) (I10)    Plan  Alcoholism    · Avoid alcoholic beverages ; Status:Complete;   Done: 62YNF1555   Ordered; For:Alcoholism; Ordered By:Tiffanie Denny; Hyponatremia    · (1) BASIC METABOLIC PROFILE; Status:Active; Requested for:76Feq2363;    Perform:[facility]; Due:39Yxu2947; Ordered;For:Hyponatremia; Ordered By:Tiffanie Denny;   · (1) [place]; Status:Active; Requested VYU:42MSH4640;    Perform:[facility]; Due:57Fhp9973; Ordered;For:Hyponatremia; Ordered By:Tiffanie Denny;   · (1) MAGNESIUM; Status:Active; Requested for:03Fry4354;    Perform:[facility]; Due:93Pll4107; Ordered;For:Hyponatremia; Ordered By:Tiffanie Denny;   · (1) PHOSPHORUS; Status:Active; Requested for:85Jhz8256;    Perform:[facility]; Due:47Vdr8961; Ordered;For:Hyponatremia; Ordered By:Tiffanie Denny;   · Do not take aspirin or anti-inflammatory medicines ; Status:Complete;   Done:  96PWP2264   Ordered;For:Hyponatremia; Ordered By:Tiffanie Denny;   · Limit your liquids to 6 glasses throughout the day ; Status:Complete;   Done: 37EFQ7574   Ordered;For:Hyponatremia; Ordered By:Tiffanie Denny;   · Seek Immediate Medical Attention if: You have nausea and vomiting that lasts longer  than 8 hours ; Status:Complete;   Done: 73GHN5423   Ordered;For:Hyponatremia; Ordered By:Tiffanie Denny;   · Follow-up visit in 2 months Evaluation and Treatment  Follow-up with Dr Vish Gary in 2  months  Status: Complete  Done: 81CQA4697   Ordered; For: Hyponatremia; Ordered By: Ayanna Bell Performed:  Due: 90TAD3220; Last Updated By: Claritza Camargo; 10/18/2017 12:23:38 PM    Discussion/Summary    This is a 48 y/o male with history of Alcohol Abuse, Seizures, Chronic hyponatremia,and HTN who returns for hospital follow up from August for acute on chronic hyponatremiaHyponatremia: Likely Beer Potomania   Appears stable from last discharge records at 132 on current medications with Lasix 20 mg daily and sodium salt tablets 2 gram TID  Will obtain new BMP now to ensure stability of sodium since discharge    Will have patient return to office in 2 months for continued renal management with chronic hyponatremia  HTN: BP acceptable on Lisinopril 10 mg daily  Will trend  Avoid hypotension mMy need to decrease dose of Lisinopril to 5 mgAlcohol Abuse: Encouraged to stop  Instructed on fluid restriction of no more than 50 oz/day  Patient admits to not being able to comply Volume: Examines fairly euvolemic  NO CHF S/Squestions asked and answered  The patient has been instructed to call office with any questions or concerns  Will see in 2 months for follow up  The patient, patient's family was counseled regarding diagnostic results,-- instructions for management,-- patient and family education,-- risks and benefits of treatment options,-- importance of compliance with treatment  The patient has the current Goals: Keep sodium level normal  The patent has the current Barriers: Alcoholic  Patient is able to Self-Care  Educational resources provided: Recommended AA  Had previous rehab in past    Possible side effects of new medications were reviewed with the patient/guardian today  CKD Teaching includes sodium restriction and not more than 50 oz  Reason For Visit  Here for hospital followup      History of Present Illness  The patient returns to office for hospital follow up for hyponatremia  He has a history of alcohol abuse, HTN, and chronic hyponatremia  While admitted the Hyponatremia was thought to be secondary to beer potomania and SIADH and was initially treated with fluid restriction, low dose diuretic and sodium salt tablets  Of note, he has had various admissions over this past year with recent admission in October  Last BMP was from discharge date of 10/12  Sodium level was 132  Goal is for sodium to be greater than 130  Medication list has been reviewed  On discussion, he admits to 75 oz of beer per day along with increased water intake  Denies chest pain, shortness of breath, nausea or vomiting or dizziness  Denies urinary issues  Can get RLQ pain at times, none now  Denies head aches, or recent seizures      Review of Systems    Constitutional: eating better  Integumentary: No complaints of skin rash  Gastrointestinal: No complains of abdominal pain, no constipation or diarrhea, no nausea or vomiting  Respiratory: No complaints of shortness of breath, no cough, no productive sputum  Cardiovascular: No complaints of orthopnea, no PND, no chest pain, no palpitations, no lower extremity edema  Musculoskeletal: No complaints of joint pain or swelling  Neurological: No complaints of headache, no lightheadedness or dizziness  Genitourinary: No dysuria, no hematuria, no nocturia, no urinary frequency, no incomplete emptying of bladder, no foamy urine  Eyes: No complaints of eyesight problems or dryness of eyes  ENT: no complaints of hearing loss, no nasal discharge  Current Meds   1  Acetaminophen 325 MG Oral Tablet; TAKE 1 TO 2 TABLETS EVERY 6 HOURS AS   NEEDED; Therapy: (Recorded:51Ocp3750) to Recorded   2  Albuterol Sulfate (2 5 MG/3ML) 0 083% Inhalation Nebulization Solution; USE 1 UNIT   DOSE EVERY 4-6 HOURS AS NEEDED FOR WHEEZING ; Therapy: 01ALE7859 to (Last Rx:20Jun2016)  Requested for: 20Jun2016 Ordered   3  Folic Acid 1 MG Oral Tablet; Take 1 tablet daily  Requested for: 40NAP9676; Last   Rx:82Zod0499 Ordered   4  Furosemide 20 MG Oral Tablet; Take 1 tablet daily; Therapy: 99UEB4825 to (Evaluate:72Tzn0433)  Requested for: 30MSC7565; Last   Rx:18Vft9288 Ordered   5  Gabapentin 100 MG Oral Capsule; TAKE 1 CAPSULE 2 TIMES A DAY; Therapy: (Recorded:18Oct2017) to Recorded   6  LevETIRAcetam 1000 MG Oral Tablet; Take 1 tablet every 12 hours; Therapy: (Recorded:18Oct2017) to Recorded   7   Lidocaine 5 % External Patch; APPLY 1 PATCH TO THE AFFECTED AREA AND LEAVE IN   PLACE FOR 12 HOURS, THEN REMOVE AND LEAVE OFF FOR 12 HOURS; Therapy: (Recorded:40Zwb6479) to Recorded   8  Lisinopril 10 MG Oral Tablet; take 1 tablet daily for blood pressure  Requested for:   20Jun2017; Last Rx:20Jun2017 Ordered   9  Magnesium Oxide 400 MG Oral Capsule; TAKE 1 CAP TWICE A DAY; Therapy: (Recorded:18Oct2017) to Recorded   10  Sodium Chloride 1 GM Oral Tablet; Take 2 tablets three times a day; Therapy: (Recorded:18Oct2017) to Recorded   11  Thiamine HCl - 100 MG Oral Tablet; TAKE 1 TABLET DAILY; Therapy: (Recorded:18Oct2017) to Recorded   12  Ventolin  (90 Base) MCG/ACT Inhalation Aerosol Solution; INHALE 1 PUFF    EVERY 4 HOURS AS NEEDED; Therapy: 32VEC9972 to (Last Rx:30Kyb5217)  Requested for: 00BIZ8005 Ordered   13  Vitamin B-12 1000 MCG Oral Tablet; TAKE 1 TABLET DAILY AS DIRECTED  Requested    for: 65Fxh3852; Last Rx:84Lsb0517 Ordered   14  Vitamin D 1000 UNIT Oral Tablet; TAKE 1 TABLET DAILY  Requested for: 09VUS2531;    Last Rx:07Jun2016 Ordered    Allergies  1  No Known Drug Allergies  2  Seasonal    Vitals  Vital Signs    Recorded: 55OIT1127 09:45AM Recorded: 48ZWB6916 09:25AM   Heart Rate 68, Apical    Pulse Quality Normal, Apical    Respiration Quality Normal    Respiration 16    Systolic 374, LUE, Sitting 92   Diastolic 60, LUE, Sitting 64   Height  5 ft 5 5 in   Weight  144 lb 2 08 oz   BMI Calculated  23 62   BSA Calculated  1 72     Physical Exam    Constitutional: chronically ill appearing  ENT: External ears and nose appear normal        JVD:  No JVD present  Pulmonary: Respiratory effort: No increased work of breathing or signs of respiratory distress  -- Auscultation of lungs: Clear to auscultation  Cardiovascular: Auscultation of heart: Normal rate and rhythm, normal S1 and S2, without murmurs  Abdomen: Non-tender, no masses  Extremities: Extremities are abnormal--   trace edema  Pulses: Dorsalis Pedis and Posterior Tibial pulses normal    Rash: No rash present  Neurologic: Non Focal      Psychiatric: Orientation to person, place, and time: Normal  -- and-- Mood and affect: Normal     Back: No CVA tenderness  Thank you very much for allowing me to participate in the care of this patient  If you have any questions, please do not hesitate to contact me        Future Appointments    Date/Time Provider Specialty Site   12/13/2017 11:00 AM Mikki Ramírez Nephrology [facility]   11/16/2017 09:30 AM Mikki Beyer Neurology [facility] NEUROLOG[facility]   10/20/2017 08:45 AM Efrain Martínez [address] [place] [address]   10/19/2017 01:30 PM Tomeka Rose[address]   12/21/2017 08:30 AM Tomeka Rose [facility] [address]     Signatures   Electronically signed by : Mikki Albarran; Oct 18 2017 10:00AM EST                       (Author)    Electronically signed by : Jesus Manuel Walker DO; Oct 18 2017  3:25PM EST                       (Author)

## 2017-10-26 NOTE — PROGRESS NOTES
Assessment  1  Abdominal fluid collection (789 59) (R18 8)   2  Anemia (285 9) (D64 9)   3  Screening PSA (prostate specific antigen) (V76 44) (Z12 5)    Plan   Abdominal fluid collection    · Gastroenterology Referral Other Co-Management  *  Status: Hold For - Scheduling   Requested for: 20Jun2017  are Referring to a non- Preferred Provider : Established Patient  Care Summary provided  : Yes  Anemia    · (1) CBC/PLT/DIFF; Status:Active; Requested ZCM:69ZYZ1438;    · (1) COMPREHENSIVE METABOLIC PANEL; Status:Active; Requested JPA:32YGB4707; Health Maintenance    · Lisinopril 10 MG Oral Tablet; take 1 tablet daily for blood pressure  Screening PSA (prostate specific antigen)    · (1) PSA (SCREEN) (Dx V76 44 Screen for Prostate Cancer); Status:Active; Requested  LVJ:39BMC8575;     DULoxetine HCl - 30 MG Oral Capsule Delayed Release Particles; TAKE ONE CAPSULE BY MOUTH EVERY DAY; Therapy: 42IZF0845 to (Evaluate:18Oct2017)  Requested for: 20Jun2017; Last Rx:20Jun2017; Status: ACTIVE Ordered  Rx By: Milly Graves; Dispense: 30 Days ; #:30 Capsule Delayed Release Particles; Refill: 3;   For: Peripheral neuropathy; GURDEEP = N; Verified Transmission to [facility]; Last Updated By: System, [employer]; 8/15/2017 1:45:29 PM  US ABDOMEN LIMITED; Status:Resulted - Requires Verification;   Done: 11VNO0488 12:00AM  Due:20Jun2018; Ordered; For:Abdominal fluid collection; Ordered By:Chema Reza; Anemia (285 9) (D64 9)          Discussion/Summary    He will restart the Cymbalta and Lisinopril  Labs as ordered  Check ultrasound of abdomen  RTC 1 month  The treatment plan was reviewed with the patient/guardian  The patient/guardian understands and agrees with the treatment plan      Chief Complaint  patient is here for regular check up he says he had surgery in october of 2016 bowl obstruction and since then has been swollen  He says he feels and looks like he is 8 months pregnant   he says sometimes it drains  Advance Directives  Advance Directive [facility]:   YES - Patient has an advance health care directive  a scanned copy is in the patient's chart  History of Present Illness  48 yo male presents for above  He wishes to see DR Vanesa Peacock of  in [place], [place]  He continues to drink but down to 4 cans daily  is noncompliant with meds and follow up  Admits he is not taking B/P meds  Review of Systems    Constitutional: No fever or chills, feels well, no tiredness, no recent weight gain or weight loss  Eyes: No complaints of eye pain, no red eyes, no discharge from eyes, no itchy eyes  ENT: no complaints of earache, no hearing loss, no nosebleeds, no nasal discharge, no sore throat, no hoarseness  Cardiovascular: No complaints of slow heart rate, no fast heart rate, no chest pain, no palpitations, no leg claudication, no lower extremity  Respiratory: No complaints of shortness of breath, no wheezing, no cough, no SOB on exertion, no orthopnea or PND  Gastrointestinal: as noted in HPI  Genitourinary: No complaints of dysuria, no incontinence, no hesitancy, no nocturia, no genital lesion, no testicular pain  Musculoskeletal: No complaints of arthralgia, no myalgias, no joint swelling or stiffness, no limb pain or swelling  Integumentary: No complaints of skin rash or skin lesions, no itching, no skin wound, no dry skin  Neurological: No compliants of headache, no confusion, no convulsions, no numbness or tingling, no dizziness or fainting, no limb weakness, no difficulty walking  Active Problems   1  Abdominal pain (789 00) (R10 9)   2  Abdominal wound dehiscence, initial encounter (998 30) (T81 30XA)   3  Allergic rhinitis (477 9) (J30 9)   4  Arm skin lesion, left (709 9) (L98 9)   5  Cervical disc disorder with myelopathy (722 71) (M50 10)   6  Cervical post-laminectomy syndrome (722 81) (M96 1)   7  Cervical spinal cord injury (952 00) (S14 109A)   8   Cervicalgia (723 1) (M54 2)   9  Chronic obstructive pulmonary disease (496) (J44 9)   10  Depression, unspecified depression type (311) (F32 9)   11  Follow-up examination following surgery (V67 00) (Z09)   12  Left foot drop (736 79) (M21 372)   13  Left leg swelling (729 81) (M79 89)   14  Lumbago (724 2) (M54 5)   15  Lumbar radiculopathy (724 4) (M54 16)   16  Peripheral neuropathy (356 9) (G62 9)   17  Postoperative visit (V58 49) (Z48 89)   18  Screening for cancer (V76 9) (Z12 9)   19  Urinary retention (788 20) (R33 9)    Anemia (285 9) (D64 9)          Past Medical History  1  History of Anxiety (300 00) (F41 9)   2  History of abnormal weight loss (V13 89) (Z87 898)   3  History of depression (V11 8) (Z86 59)   4  History of fatigue (V13 89) (Z87 898)   5  History of hypertension (V12 59) (Z86 79)   6  History of sleep disturbance (V13 89) (Z87 898)   7  Personal history of arthritis (V13 4) (Z87 39)   8  History of Wears glasses (V49 89) (Z97 3)    The active problems and past medical history were reviewed and updated today  Surgical History   1  History of Duodenum Perforated Ulcer Closure   2  History of Neuroplasty Median Nerve At Carpal Tunnel    The surgical history was reviewed and updated today  History of Rotator Cuff Repair             Family History  Mother    1  No pertinent family history    The family history was reviewed and updated today  Social History    · Alcohol Use (History)   · Caffeine Use   · Currently sexually active   · Denied: History of Drug use   · No known STD risk factors   · Occasional alcohol use  The social history was reviewed and updated today  The social history was reviewed and is unchanged  Current Every Day Smoker (305 1)             Current Meds   1  Albuterol Sulfate (2 5 MG/3ML) 0 083% Inhalation Nebulization Solution; USE 1 UNIT   DOSE EVERY 4-6 HOURS AS NEEDED FOR WHEEZING ; Therapy: 97MTR8888 to (Last Rx:20Jun2016)  Requested for: 20Jun2016 Ordered   2   Ensure Nutrition Shake Oral Liquid; USE AS DIRECTED; Therapy: 94CJC4210 to (Last Rx:07Jun2016)  Requested for: 07Jun2016 Ordered   3  Folic Acid 1 MG Oral Tablet; Take 1 tablet daily  Requested for: 40Mkj9517; Last   Rx:01Ewd1770 Ordered   4  Lisinopril 10 MG Oral Tablet; TAKE 1 TABLET DAILY FOR BLOOD PRESSURE    Requested for: 81ISL9907; Last Rx:07Jun2016 Ordered   5  Loratadine 10 MG Oral Tablet; Take 1 tablet daily  Requested for: 51ZND1531; Last   Rx:07Jun2016 Ordered   6  Magnesium Oxide 400 MG Oral Tablet; TAKE 1 TABLET DAILY; Therapy: (Recorded:02Feb2016) to Recorded   7  Omeprazole 20 MG CPCR; Therapy: (428 9884) to Recorded   8  Poly-Iron 150 Forte CAPS; Therapy: (808 7884) to Recorded   9  Thera-M Vitamin TABS; TAKE 1 TABLET DAILY; Therapy: (Recorded:02Feb2016) to Recorded   10  Thiamine HCl - 100 MG Oral Tablet; Therapy: (547 7715) to Recorded   11  Ventolin  (90 Base) MCG/ACT Inhalation Aerosol Solution; INHALE 1 PUFF    EVERY 4 HOURS AS NEEDED; Therapy: 21BWV8962 to (Last Rx:37Squ3294)  Requested for: 98SUW9472 Ordered   12  Vitamin B-12 1000 MCG Oral Tablet; TAKE 1 TABLET DAILY AS DIRECTED  Requested    for: 22Ine3648; Last Rx:58Xye2968 Ordered   13  Vitamin D 1000 UNIT Oral Tablet; TAKE 1 TABLET DAILY  Requested for: 81ERW9193;    Last Rx:07Jun2016 Ordered    The medication list was reviewed and updated today  Allergies  1  No Known Drug Allergies    Vitals  Vital Signs    Recorded: 20Jun2017 03:28PM   Temperature 96 8 F   Heart Rate 80   Systolic 230   Diastolic 92   Height 5 ft 8 in   Weight 136 lb    BMI Calculated 20 68   BSA Calculated 1 73   O2 Saturation 96     Physical Exam    Constitutional   General appearance: No acute distress, well appearing and well nourished  Eyes   Conjunctiva and lids: No swelling, erythema, or discharge  Pupils and irises: Equal, round and reactive to light      Ears, Nose, Mouth, and Throat   External inspection of ears and nose: Normal     Pulmonary   Respiratory effort: No increased work of breathing or signs of respiratory distress  Auscultation of lungs: Clear to auscultation, equal breath sounds bilaterally, no wheezes, no rales, no rhonci  Cardiovascular   Palpation of heart: Normal PMI, no thrills  Auscultation of heart: Normal rate and rhythm, normal S1 and S2, without murmurs  Examination of extremities for edema and/or varicosities: Normal     Abdomen   Abdomen: Abnormal  -- well healed scar midline with large amount of fluid noted  Lymphatic   Palpation of lymph nodes in neck: No lymphadenopathy  Musculoskeletal   Gait and station: Normal     Skin   Skin and subcutaneous tissue: Normal without rashes or lesions  Neurologic   Cranial nerves: Cranial nerves 2-12 intact  Psychiatric   Orientation to person, place and time: Normal     Mood and affect: Normal          Future Appointments    Date/Time Provider Specialty Site   09/21/2017 07:30 AM Ramon Dumont, [address] [place] [address]     Signatures   Electronically signed by :  Toro Zuniga [facility]; Jun 20 2017  4:02PM EST                       (Author)    Electronically signed by : Eugene Rosas MD; Sep 10 2017 10:15PM EST                       (Author)

## 2017-11-02 ENCOUNTER — ALLSCRIPTS OFFICE VISIT (OUTPATIENT)
Dept: FAMILY MEDICINE CLINIC | Facility: CLINIC | Age: 52
End: 2017-11-02
Payer: COMMERCIAL

## 2017-11-02 ENCOUNTER — GENERIC CONVERSION - ENCOUNTER (OUTPATIENT)
Dept: OTHER | Facility: OTHER | Age: 52
End: 2017-11-02

## 2017-11-02 PROCEDURE — T1015 CLINIC SERVICE: HCPCS | Performed by: FAMILY MEDICINE

## 2017-11-16 ENCOUNTER — TRANSCRIBE ORDERS (OUTPATIENT)
Dept: ADMINISTRATIVE | Facility: HOSPITAL | Age: 52
End: 2017-11-16

## 2017-11-16 ENCOUNTER — ALLSCRIPTS OFFICE VISIT (OUTPATIENT)
Dept: OTHER | Facility: OTHER | Age: 52
End: 2017-11-16

## 2017-11-16 DIAGNOSIS — G40.911 INTRACTABLE EPILEPSY WITH STATUS EPILEPTICUS, UNSPECIFIED EPILEPSY TYPE (HCC): Primary | ICD-10-CM

## 2017-11-21 ENCOUNTER — APPOINTMENT (OUTPATIENT)
Dept: LAB | Facility: HOSPITAL | Age: 52
End: 2017-11-21
Payer: COMMERCIAL

## 2017-11-21 ENCOUNTER — GENERIC CONVERSION - ENCOUNTER (OUTPATIENT)
Dept: OTHER | Facility: OTHER | Age: 52
End: 2017-11-21

## 2017-11-21 ENCOUNTER — TRANSCRIBE ORDERS (OUTPATIENT)
Dept: ADMINISTRATIVE | Facility: HOSPITAL | Age: 52
End: 2017-11-21

## 2017-11-21 ENCOUNTER — HOSPITAL ENCOUNTER (OUTPATIENT)
Dept: NEUROLOGY | Facility: HOSPITAL | Age: 52
Discharge: HOME | End: 2017-11-21
Payer: COMMERCIAL

## 2017-11-21 DIAGNOSIS — G40.911 INTRACTABLE EPILEPSY WITH STATUS EPILEPTICUS, UNSPECIFIED EPILEPSY TYPE (HCC): ICD-10-CM

## 2017-11-21 DIAGNOSIS — E83.42 HYPOMAGNESEMIA: ICD-10-CM

## 2017-11-21 DIAGNOSIS — G40.909 EPILEPSY WITHOUT STATUS EPILEPTICUS, NOT INTRACTABLE (HCC): ICD-10-CM

## 2017-11-21 LAB
ALBUMIN SERPL BCP-MCNC: 3.9 G/DL (ref 3.5–5)
ALP SERPL-CCNC: 182 U/L (ref 46–116)
ALT SERPL W P-5'-P-CCNC: 25 U/L (ref 12–78)
ANION GAP SERPL CALCULATED.3IONS-SCNC: 9 MMOL/L (ref 4–13)
AST SERPL W P-5'-P-CCNC: 47 U/L (ref 5–45)
BILIRUB SERPL-MCNC: 0.5 MG/DL (ref 0.2–1)
BUN SERPL-MCNC: 3 MG/DL (ref 5–25)
CALCIUM SERPL-MCNC: 9.2 MG/DL (ref 8.3–10.1)
CHLORIDE SERPL-SCNC: 91 MMOL/L (ref 100–108)
CO2 SERPL-SCNC: 26 MMOL/L (ref 21–32)
CREAT SERPL-MCNC: 0.65 MG/DL (ref 0.6–1.3)
GFR SERPL CREATININE-BSD FRML MDRD: 113 ML/MIN/1.73SQ M
GLUCOSE SERPL-MCNC: 98 MG/DL (ref 65–140)
MAGNESIUM SERPL-MCNC: 1.5 MG/DL (ref 1.6–2.6)
PHOSPHATE SERPL-MCNC: 3.8 MG/DL (ref 2.7–4.5)
POTASSIUM SERPL-SCNC: 3.9 MMOL/L (ref 3.5–5.3)
PROT SERPL-MCNC: 8.2 G/DL (ref 6.4–8.2)
SODIUM SERPL-SCNC: 126 MMOL/L (ref 136–145)

## 2017-11-21 PROCEDURE — 83735 ASSAY OF MAGNESIUM: CPT

## 2017-11-21 PROCEDURE — 36415 COLL VENOUS BLD VENIPUNCTURE: CPT

## 2017-11-21 PROCEDURE — 80177 DRUG SCRN QUAN LEVETIRACETAM: CPT

## 2017-11-21 PROCEDURE — 84100 ASSAY OF PHOSPHORUS: CPT

## 2017-11-21 PROCEDURE — 95816 EEG AWAKE AND DROWSY: CPT

## 2017-11-21 PROCEDURE — 80053 COMPREHEN METABOLIC PANEL: CPT

## 2017-11-21 NOTE — PROCEDURES
ELECTROENCEPHALOGRAM (EEG)      Patient Name:  Ayden Marks  MRN: [id number]   :  1965 File #: Nathaniel Catherine [id number]   Age: 47 y o  Encounter #: [id number]   Date performed: 2017            Report date: 2017          Study type: Routine EEG    ICD 10 diagnosis: Spells/Fit NOS R56 9 and Syncope R55    Start time: 61 End time: 55     -------------------------------------------------------------------------------------------------------------------   Patient History: This recording was performed in a 47 y o  male with recent syncope and 2 apparent GTC seizures to evaluate for evidence of epilepsy  Medications include:   None  -------------------------------------------------------------------------------------------------------------------   Description of Procedure:  · 32 channel digital recording with electrodes placed according to the International 10-20 system with additional T1/T2 electrodes, EOG, EKG, and simultaneous video  The recording was technically satisfactory  -------------------------------------------------------------------------------------------------------------------   EEG Description:    The recording was performed with the patient awake  He was oriented  During wakefulness, there were long runs of well regulated, low amplitude, posteriorly dominant, symmetric 8 5-9 cps alpha rhythm that attenuated with eye opening  There were symmetric low amplitude, frontally dominant beta activities  Drowsiness and sleep were not attained  Activation Procedures:  Hyperventilation was performed for 3-4 minutes and did not produce any changes  Stepped photic stimulation between 1-30 cps induced brief mild symmetric photic driving  Other findings:  Samples of the single channel ECG demonstrated a regular rhythm      Events:   No significant push buttons were activated  -------------------------------------------------------------------------------------------------------------------   EEG Interpretation: This Routine EEG recorded during wakefulness is normal     A normal EEG does not exclude the diagnosis of epilepsy/seizure  If clinically indicated, a repeat EEG allowing for sleep, possibly after sleep deprivation or with prolonged recording, may provide additional diagnostic information         Monet Alvarado MD   [address] [facility] [place]

## 2017-11-21 NOTE — PROGRESS NOTES
Assessment  1  Seizure disorder (345 90) (G40 909)   2  Hyponatremia (276 1) (E87 1)   3  Alcoholism (303 90) (F10 20)   4  Essential hypertension (401 9) (I10)   5  Neuropathy involving both lower extremities (356 9) (G57 93)   6  T6 vertebral fracture (805 2) (S22 639A)    Plan  Peripheral neuropathy    · Gabapentin 100 MG Oral Capsule; TAKE 1 CAPSULE 3 TIMES DAILY   Rx By: Trino Huertas; Dispense: 30 Days ; #:90 Capsule; Refill: 0;Peripheral neuropathy; GUDREEP = N; Verified Transmission to [facility]; Last Updated By: System, [employer]; 11/16/2017 10:19:45 AM  Peripheral neuropathy, Seizure disorder    · Follow-up visit in 3 months Evaluation and Treatment  Follow-up  Status: Complete Done: 20OSK5092   Ordered; For: Peripheral neuropathy, Seizure disorder; Ordered By: Trino Huertas Performed:  Due: 98YZZ1473; Last Updated By: Venecia Fajardo; 11/16/2017 10:29:04 AM  Seizure disorder    · (1) LEVETIRACETAM ( KEPPRA); Status:Active; Requested for:16Nov2017;    Perform:[facility]; UUS:[id number]; Ordered; For:Seizure disorder; Ordered By:Marita Reyes;   · EEG AWAKE (ROUTINE); Status:Active; Requested for:16Nov2017;    Perform:[facility]; Due:96Hnm5952; Last Updated Nereida Ventura; 11/16/2017 10:29:04 AM;Ordered;disorder; Ami Ruck By:Shirley Reyes Factor; Discussion/Summary  Discussion Summary:   Pt seen in hospital for new onset seizure related to low Sodium, ETOH abuse  Seizure disorder:  NOw on Keppra 1000mg BID no further events, EEG in hospital was normal  pt aware of [place] requirements for no driving  aware of risk factors for sx to include ETOH, pt stopped last 28 days  will get out pt EEG  will obtain keppra level  appears to be around 130    most recent level noted 133will have lab work done and will follow up results with PCP  will remain on 2g NaCl tabs TID and fluid restriction  with nephrology  abusewas counseled to refrain from alcohol, attending [place] meetings  with the lisinopril  follow up   long standingGabapentin 300mg tid no significant changes, following with PCP, consider pain management  Counseling Documentation With Imm: The patient was counseled regarding diagnostic results,-- instructions for management,-- risk factor reductions,-- prognosis,-- impressions,-- risks and benefits of treatment options,-- importance of compliance with treatment  total time of encounter was 50 minutes-- and-- >50% minutes was spent counseling  Patient's Capacity to Self-Care: Patient is able to Self-Care  Medication SE Review and Pt Understands Tx: Possible side effects of new medications were reviewed with the patient/guardian today  The treatment plan was reviewed with the patient/guardian  The patient/guardian understands and agrees with the treatment plan   Patient Guardian understands agrees: The treatment plan was reviewed with the patient/guardian  The patient/guardian understands and agrees with the treatment plan      History of Present Illness  HPI: Yung Bryson is a 47 y o  male patient who originally presented to the hospital on 7/28/2017 due to a syncopal event and fall  Pt notes awakening feeling dizziness and his legs were weak, He awoke and proceeded to go into the bathroom, he remembers falling and hitting his head  He was found unresponsive near the toilet  Upon EMS arrival GCS of 3-4, GCS of 13 upon arrival to ED [place]  Found to have acute T6, T7 compression fractures, hyponatremia of 111, and alcohol level of 30  CT without 7/29/17:No acute intracranial abnormality  Premature volume loss    Transferred to [facility] where upon arrival to MICU had tonic clonic seizure event, Ativan given, Keppra load administered  Placed on vEEG monitoring, during which patient did not have any clinical events, no seizure noted  Patient has history of seizures from alcohol withdrawal  He drinks 4 beers daily  Not on AED therapy at home   He was followed by neurosurgery for his thoracic fractures and discharged home in a brace  He was seen by neurology, AED was started regardless because of his fractured vertebrae and concern that Na would drop if patient continued to drink  He was placed on Keppra 750mg bid  He was also followed by Nephrology for hyponatremia  Work up for this was essentially negative and thought to be 2/2 ETOH intake/excess  He was discharged on a fluid restriciton of 1 L a day as well as 2 g NaCl tablet TID  He had no other seizures during his stay  His sodium did improve up to 129  he had echocardiogram done during his stay with Systolic function was normal  Ejection fraction was estimated to be 60 %, carotid ultrasound noted less than 50% bilateral ICA stenosis  He was discharged home  unfortunately, he presented back to the ER on October 5th secondary to having had a seizure at home  According the patient he lost his balance, his vision became blurred  He denied any loss of consciousness, no incontinence  He claims compliance with keppra which he takes for 'seizure disorder' but continued to consume daily at least 6 20 oz beer units, the last of which was just prior to his admissionÂ  his sodium was noted to be 131, ETOH was 398, The patient was admitted and placed on seizure precautions, given IV fluid hydration  Keppra dose was increased to 1000mg BID per tele-neurology  repeat CTA of head was unremarkable  The patient did suffer alcohol withdrawal symptoms while in the hospital  He was given round the clock librium and ativan as needed for alcohol withdrawal syndrome  Thiamine and folate supplementation was continued  He was also given an IV banana bag for several days  Furthermore, his sodium decreased throughout his stay to 129  After discontinuing IV fluid hydration and adding back his salt tablets, this improved on the date of discharge to 133  Of note, he was also found to have severe hypomagnesemia, for which both IV and PO supplementation was given   He was prescribed magnesium oxide 400mg BID at discharge  Overall, after a 7 day hospital stay he was stable for discharge home  he had no further seizure activity     Mr Sánchez De Jesus presents today for hospital follow-up  Overall he is doing fairly well  He denies having had any interim seizure activity  He states he is compliant with his Keppra 1000 mg twice a day  He denies any associated side effects  He did complete a course of physical therapy  He has had no interim will falls  He does describe a chronic numbness of his extremities from his elbows to his fingers as well as his knees down  On occasion he has some stabbing in his feet  He was seen by his primary, his gabapentin was increased to 300 mg t i d  It was suggested that he seek further evaluation with pain management  He was also seen by Neurosurgery for to follow-up from his T6-T7 fracture, he is no longer in his TLSO brace  He denies any significant complaints of back pain  He was seen by Nephrology, he continues on sodium tablets, lab work was ordered and is due next week  In speaking with him today, he states he has not had a drink for the past 29 days, he is attending [place] [address] meetings  He does continue to smoke  He has an upcoming evaluation with Neurology on December 5th  , , meds were reviewed  Review of Systems  Neurological ROS:  Constitutional: recent weight gain-- and-- fatigue  HEENT: hoarseness  Cardiovascular:  no chest pain or pressure, no palpitations present, the heart rate was not rapid or irregular, no swelling in the arms or legs, no poor circulation  Respiratory:  no unusual or persistant cough, no shortness of breath with or without exertion  Gastrointestinal: abdominal pain,-- changes in bowel habits-- and-- loss of bowel control  Genitourinary: incontinence-- and-- feelings of urinary urgency  Musculoskeletal: arthralgias,-- myalgias,-- immobility or loss of function,-- head/neck/back pain-- and-- pain while walking    Integumentary skin lesion(s): [place] Psychiatric: depression-- and-- mood swings  Endocrine loss of sexual ability or drive -- and-- erection difficulty  Hematologic/Lymphatic:  no unusual bleeding, no tendency for easy bruising, no clotting skin or lumps  Neurological General: convulsions,-- blackouts,-- increased sleepiness,-- trouble falling asleep-- and-- waking up at night, but-- as noted in HPI  Neurological Mental Status: mood swings-- and-- memory problems  Neurological Motor findings include: twitching-- and-- cramping(pre/post exercise)  Neurological Coordination: balance difficulties  Neurological Sensory: numbness  Neurological Gait: difficulty walking-- and-- has had falls  ROS Reviewed:   ROS reviewed  Active Problems  1  Abdominal fluid collection (789 59) (R18 8)   2  Abdominal pain (789 00) (R10 9)   3  Abdominal wound dehiscence, initial encounter (998 30) (T81 30XA)   4  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)   5  Alcoholism (303 90) (F10 20)   6  Allergic rhinitis (477 9) (J30 9)   7  Anemia (285 9) (D64 9)   8  Arm skin lesion, left (709 9) (L98 9)   9  Bladder wall thickening (596 89) (N32 89)   10  Cervical disc disorder with myelopathy (722 71) (M50 10)   11  Cervical post-laminectomy syndrome (722 81) (M96 1)   12  Cervical spinal cord injury (952 00) (S14 109A)   13  Cervicalgia (723 1) (M54 2)   14  Chronic obstructive pulmonary disease (496) (J44 9)   15  Depression, unspecified depression type (311) (F32 9)   16  Essential hypertension (401 9) (I10)   17  Follow-up examination following surgery (V67 00) (Z09)   18  Hypomagnesemia (275 2) (E83 42)   19  Hyponatremia (276 1) (E87 1)   20  Left foot drop (736 79) (M21 372)   21  Left leg swelling (729 81) (M79 89)   22  Lumbago (724 2) (M54 5)   23  Lumbar radiculopathy (724 4) (M54 16)   24  Neuropathy involving both lower extremities (356 9) (G57 93)   25  Peripheral edema (782 3) (R60 9)   26   Peripheral neuropathy (356 9) (G62 9) 27  Postoperative visit (V58 49) (Z48 89)   28  Screening for cancer (V76 9) (Z12 9)   29  Screening for diabetes mellitus (V77 1) (Z13 1)   30  Screening PSA (prostate specific antigen) (V76 44) (Z12 5)   31  Seizure disorder (345 90) (G40 909)   32  T6 vertebral fracture (805 2) (S22 059A)   33  Urinary retention (788 20) (R33 9)   34  Wedge compression fracture of T6 vertebra (805 2) (S22 050A)   35  Wedge compression fracture of T7 vertebra (805 2) (S22 060A)   36  Wound of back (876 0) (S21 209A)    Past Medical History  1  History of Anxiety (300 00) (F41 9)   2  History of abnormal weight loss (V13 89) (Z87 898)   3  History of depression (V11 8) (Z86 59)   4  History of fatigue (V13 89) (Z87 898)   5  History of hypertension (V12 59) (Z86 79)   6  History of sleep disturbance (V13 89) (Z87 898)   7  Personal history of arthritis (V13 4) (Z87 39)   8  History of Substance abuse withdrawal (292 0) (F19 239)   9  History of Wears glasses (V49 89) (Z97 3)    Surgical History  1  History of Arthrodesis Cervical   2  History of Duodenum Perforated Ulcer Closure   3  History of Neck Repair   4  History of Neuroplasty Median Nerve At Carpal Tunnel   5  History of Rotator Cuff Repair    Family History  Mother    1  Family history of malignant neoplasm of breast (V16 3) (Z80 3)   2  No pertinent family history  Father    3  Family history of colorectal cancer (V16 0) (Z80 0)  Brother    4  Family history of myocardial infarction (V17 3) (Z82 49)  Family History Reviewed: The family history was reviewed and updated today  Social History     · Alcohol Use (History)   · Caffeine Use   · Current Every Day Smoker (305 1)   · Currently sexually active   · Disabled   · Denied: History of Drug use   · High school or GED   · No known STD risk factors   · Occasional alcohol use  Social History Reviewed: The social history was reviewed and updated today  Current Meds   1   Albuterol Sulfate (2 5 MG/3ML) 0 083% Inhalation Nebulization Solution; USE 1 UNIT DOSE EVERY 4-6 HOURS AS NEEDED FOR WHEEZING ; Therapy: 34BVC0519 to (Last Rx:13Nov2017)  Requested for: 36OTQ9960 Ordered   2  Folic Acid 1 MG Oral Tablet; Take 1 tablet daily  Requested for: 01IVX3764; Last Rx:02Nov2017 Ordered   3  Furosemide 20 MG Oral Tablet; Take 1 tablet daily; Therapy: 68ZZF7209 to (Evaluate:06Afw6803)  Requested for: 46JYN4981; Last Rx:48Gzh4260 Ordered   4  Gabapentin 100 MG Oral Capsule; TAKE 1 CAPSULE 2 TIMES A DAY  Requested for: 41DZW3503; Last Rx:13Nov2017 Ordered   5  LevETIRAcetam 1000 MG Oral Tablet; Take 1 tablet every 12 hours; Therapy: (Recorded:18Oct2017) to Recorded   6  Lidocaine 5 % External Patch; APPLY 1 PATCH TO THE AFFECTED AREA AND LEAVE IN PLACE FOR 12 HOURS, THEN REMOVE AND LEAVE OFF FOR 12 HOURS; Therapy: (Recorded:04Sak1092) to Recorded   7  Lisinopril 10 MG Oral Tablet; take 1 tablet daily for blood pressure  Requested for: 20Jun2017; Last Rx:20Jun2017 Ordered   8  Magnesium Oxide 400 MG Oral Capsule; TAKE 1 CAP TWICE A DAY; Last Rx:02Nov2017 Ordered   9  Sodium Chloride 1 GM Oral Tablet; take 3  tablet twice daily  Requested for: 92IXK8176; Last Rx:03Nov2017 Ordered   10  Thiamine HCl - 100 MG Oral Tablet; TAKE 1 TABLET DAILY; Therapy: (Recorded:18Oct2017) to Recorded   11  Ventolin  (90 Base) MCG/ACT Inhalation Aerosol Solution; INHALE 1 PUFF  EVERY 4 HOURS AS NEEDED; Therapy: 62IOD4960 to (Last Rx:08Nov2017)  Requested for: 00GXZ0279 Ordered   12  Vitamin B-12 1000 MCG Oral Tablet; TAKE 1 TABLET DAILY AS DIRECTED  Requested  for: 60Dyq8968; Last Rx:28Bgf8058 Ordered   13  Vitamin D 1000 UNIT Oral Tablet; TAKE 1 TABLET DAILY  Requested for: 19LOH9903;  Last Rx:07Jun2016 Ordered  Medication List Reviewed: The medication list was reviewed and updated today  Allergies  1  No Known Drug Allergies    2   Seasonal    Vitals  Signs   Recorded: 19CHL8313 05:21PM   Heart Rate: 92  Respiration: 20  Systolic: 417  Diastolic: 78  Height: 5 ft 5 in  Weight: 144 lb   BMI Calculated: 23 96  BSA Calculated: 1 72    Physical Exam   Constitutional  General Appearance: Appears appropriate for age, healthy, well developed, appropriately groomed and appropriately dressed   Eyes  Ophthalmoscopic examination: Vision is grossly normal  Gross visual field testing by confrontation shows no abnormalities  EOMI in both eyes  Conjunctivae clear  Eyelids normal palpebral fissures equal  Orbits exhibit normal position  No discharge from the eyes  PERRL  External inspection of ears and nose: Normal      Neck  Neck and thyroid: Normal to inspection and palpation  Pulmonary  Respiratory effort: Lungs are clear bilaterally  Auscultation of lungs: Clear to auscultation  Cardiovascular  Auscultation of heart: Rate is regular  Rhythm is regular  Carotid pulses: Normal, 2+ bilaterally  Peripheral vascular exam: Normal pulses throughout, no tenderness, erythema or swelling  Musculoskeletal  Gait and station: Abnormal  -- wide based  Muscle strength: Normal strength throughout  Muscle tone: No atrophy, abnormal movements, flaccidity, cogwheeling or spasticity  Range of motion: Abnormal  (pt maintains neck in flexed position, decreased ROM)   Stability: Normal     Inspection of temporomandibular joint appears normal    Neurologic  Orientation to person, place, and time: Normal    Recent and remote memory: Abnormal    Attention span and concentration: Normal thought process and attention span  Language: Names objects, able to repeat phrases and speaks spontaneously  Fund of knowledge: Abnormal    Sensation: Abnormal  -- (absent vibration distally UE from elbow to hand, Absent vibration LE to knees   Temperature loss to below knees B/L, hands)  Reflexes: Abnormal   Deep tendon reflexes: 1+ right patella,-- 1+ left patella,-- 1+ right ankle jerk-- and-- 0 left ankle jerk2+ right biceps,-- 2+ left biceps,-- 2+ right triceps,-- 2+ left triceps,-- 2+ right brachioradialis-- and-- 2+ left brachioradialis  Coordination: Cerebellum function intact  No involuntary movement or psychomotor activity  Judgment and insight: Normal    Motor System: No pronator drift  Cranial Nerve Exam  II: Normal with no deficit  III,IV, VI: Normal with no deficit  V: Normal with no deficit  VII: Normal with no deficit  VIII: Normal with no deficit  XI: Normal with no deficit  Mood and affect: Normal        Results/Data  Diagnostic Studies Reviewed: I personally reviewed the films/images/results in the office today  My interpretation follows  Diagnostic Review CT head (10/5/17):acute intracranial hemorrhage seenmass effect or midline shift seento severe mucosal thickening seen in the left maxillary sinus, was not seen on the previous study  without 7/29/17:No acute intracranial abnormality  Premature volume loss  US 7/29/17:ImpressionThere is <50% stenosis noted in the internal carotid artery  Plaque is heterogenous/calcified and irregular  Vertebral artery flow is antegrade  There is no significant subclavian artery disease  LEFT: There is <50% stenosis noted in the internal carotid artery  Plaque is heterogenous/calcified and irregular  Vertebral artery flow is antegrade  There is no significant subclavian artery disease  to previous study on 05/18/2017, there is no B/L interval change in the disease process  spine X-Ray:No plain film evidence of instability  Patient is status post posterior fusion of C4-T1, and anterior fusion of C5-T1  The right pedicle screw at T1 is broken, which is a long-standing finding dating back to 6/15/2015  Otherwise, hardware is intact  X-Ray:1  Unchanged T6 compression fracture  Interval worsening of T7 compression fracture since 7/28/2017  Brain 5/2017:No acute intracranial abnormality or pathologic enhancement  Moderate generalized cerebral volume loss without lobar predilection    7/29/17:LEFT VENTRICLE:function was normal  Ejection fraction was estimated to be 60 %  no diagnostic regional wall motion abnormality was identified, this possibility cannot be completely excluded on the basis of this study  thickness was mildly increased  VALVE: There was mild annular calcification  There was trace regurgitation  Â VALVE:The valve was trileaflet  Leaflets exhibited moderate calcification and mildly reduced cuspal separation  There was mild stenosis  Valve mean gradient was 11 mmHg  VALVE:There was mild regurgitation  Pulmonary artery systolic pressure was within the normal range  Video EEG 7/30/17: This prolonged, continuous video-EEG recording is mildly abnormal  A background of low amplitude beta and alpha activities without clear posteriorly posteriorly dominant rhythm during wakefulnesspossible mild nonspecific diffuse cerebral dysfunction  beta activities are a medication effect related to benzodiazepine administration  No electrographic seizures or interictal epileptiform discharges are seen  7/28/17: Kj=868, K=3 2, QQ=918, KZV=067, Alk phos=259, Hgb=9 3, , Hct=30 8, Ammonia=39, CKMB=10 5  8/5/17: Pq=674  10/5/17: Ut=290, NEE=959, alk phos=258, ammonia=30, Ethanol=398, Keppra=30 1  10/11/17: Z92=517, TSH=3 212        (1) Mick 70DLB8966 11:22AM Freeman Boas     Test Name Result Flag Reference   SODIUM 133 mmol/L L 136-145   POTASSIUM 4 8 mmol/L  3 5-5 3   CHLORIDE 99 mmol/L L 100-108   CARBON DIOXIDE 28 mmol/L  21-32   ANION GAP (CALC) 6 mmol/L  4-13   BLOOD UREA NITROGEN 7 mg/dL  5-25   CREATININE 0 51 mg/dL L 0 60-1 30   Standardized to IDMS reference method   CALCIUM 9 0 mg/dL  8 3-10 1   eGFR 124 ml/min/1 73sq m       National Kidney Disease Education Program recommendations are as follows: GFR calculation is accurate only with a steady state creatinine Chronic Kidney disease less than 60 ml/min/1 73 sq  meters Kidney failure less than 15 ml/min/1 73 sq  meters     GLUCOSE FASTING 81 mg/dL  65-99   Specimen collection should occur prior to Sulfasalazine administration due to the potential for falsely depressed results  Specimen collection should occur prior to Sulfapyridine administration due to the potential for falsely elevated results  (1) CBC/PLT/DIFF 94Qlz3061 10:04AM Danielle Pollock Order Number: [id number]     Test Name Result Flag Reference   WBC COUNT 5 60 Thousand/uL  4 31-10 16   RBC COUNT 4 09 Million/uL  3 88-5 62   HEMOGLOBIN 10 6 g/dL L 12 0-17 0   HEMATOCRIT 32 5 % L 36 5-49 3   MCV 80 fL L 82-98   MCH 25 9 pg L 26 8-34 3   MCHC 32 6 g/dL  31 4-37 4   RDW 24 3 % H 11 6-15 1   MPV 9 3 fL  8 9-12 7   PLATELET COUNT 005 Thousands/uL  149-390   nRBC AUTOMATED 0 /100 WBCs     NEUTROPHILS RELATIVE PERCENT 37 % L 43-75   LYMPHOCYTES RELATIVE PERCENT 48 % H 14-44   MONOCYTES RELATIVE PERCENT 11 %  4-12   EOSINOPHILS RELATIVE PERCENT 3 %  0-6   BASOPHILS RELATIVE PERCENT 1 %  0-1   NEUTROPHILS ABSOLUTE COUNT 2 10 Thousands/? ??L  1 85-7 62   LYMPHOCYTES ABSOLUTE COUNT 2 62 Thousands/? ??L  0 60-4 47   MONOCYTES ABSOLUTE COUNT 0 63 Thousand/? ??L  0 17-1 22   EOSINOPHILS ABSOLUTE COUNT 0 19 Thousand/? ??L  0 00-0 61   BASOPHILS ABSOLUTE COUNT 0 05 Thousands/? ??L  0 00-0 10     (1) VITAMIN B12 36Lku9157 10:04AM Danielle Pollock Order Number: [id number]     Test Name Result Flag Reference   VITAMIN B12 343 pg/mL  100-900     (1) FOLATE 15LUJ3910 10:04AM Danielle Pollock Order Number: [id number]     Test Name Result Flag Reference   FOLATE 12 7 ng/mL  3 1-17 5     Future Appointments    Date/Time Provider Specialty Site   12/13/2017 11:00 AM Gayathri Melendez, [address]  Nephrology [facility] Mike Parks   03/01/2018 08:45 AM Cheyanne Davila, [address] Neurology [facility]  Daisha An   12/21/2017 08:30 AM Ham Shields [address]       Signatures   Electronically signed by : Carlos Mcknight; Nov 19 2017  5:57PM EST (Author)    Electronically signed by : Carlos Mcknight; Nov 19 2017  5:58PM EST                       (Author)    Electronically signed by : SHEYLA Carias ; Nov 20 2017  3:27PM EST                       (Author)

## 2017-11-27 ENCOUNTER — GENERIC CONVERSION - ENCOUNTER (OUTPATIENT)
Dept: OTHER | Facility: OTHER | Age: 52
End: 2017-11-27

## 2017-11-27 LAB — LEVETIRACETAM SERPL-MCNC: 33.9 UG/ML (ref 10–40)

## 2017-11-28 DIAGNOSIS — E87.1 HYPO-OSMOLALITY AND HYPONATREMIA (CODE): ICD-10-CM

## 2017-11-28 DIAGNOSIS — E83.42 HYPOMAGNESEMIA: ICD-10-CM

## 2017-12-18 DIAGNOSIS — E87.1 HYPO-OSMOLALITY AND HYPONATREMIA (CODE): ICD-10-CM

## 2017-12-21 ENCOUNTER — ALLSCRIPTS OFFICE VISIT (OUTPATIENT)
Dept: FAMILY MEDICINE CLINIC | Facility: CLINIC | Age: 52
End: 2017-12-21

## 2018-01-09 NOTE — MISCELLANEOUS
Assessment    1  Anemia (285 9) (D64 9)   2  Hyponatremia (276 1) (E87 1)   3  Alcoholism (303 90) (F10 20)    Plan   Alcoholism    · Follow-up visit in 1 month Evaluation and Treatment  Follow-up  Status: Hold For -  Scheduling  Requested for: 59AGR6326   Ordered; For: Alcoholism; Ordered By: Roque Hill Performed:  Due: 34WMU9646    Gabapentin 300 MG Oral Capsule; TAKE 1 CAPSULE 3 TIMES DAILY  Requested for: 99IIU0503; Last Rx:31Xfc6550; Status: ACTIVE Ordered  Rx By: Roque Hill; Dispense: 0 Days ; #:90 Capsule; Refill: 2;   For: Peripheral neuropathy; GURDEEP = N; Verified Transmission to [facility]65; Last Updated By: System, SureScript[employer]; 10/18/2017 9:31:46 AM  (1) CBC/PLT/DIFF; Status:Resulted - Requires Verification;   Done: 08QNH7429 12:00AM  Due:54Pxm4886; Ordered; Galina Murphy; Ordered By:Mikala Reza;   (1) IRON; Status:Resulted - Requires Verification;   Done: 55RVK9306 12:00AM  Due:05Vaf4880; Ordered; Galina Murphy; Ordered By:Mikala Reza;   (1) COMPREHENSIVE METABOLIC PANEL; Status:Resulted - Requires Verification;   Done: 93HBN9172 12:00AM  Due:59Wec0080; Ordered; For:Anemia, Hyponatremia; Ordered Mara Mail; Alcoholism (303 90) (F10 20)          Discussion/Summary  Discussion Summary:   I will increase the gabapentin to 300 mg TID  He will need pain management  He was given phone numbers to call that will take his insurance with numbers for Neurology  Labs today as ordered  RTC 1 month  Chief Complaint  Chief Complaint Free Text Note Form: Patient is here for hospital follow up  Patient was seen about a week and a half ago at [facility] in [place]  Patient states that he had a seizure fell hit the coffee table and broke his back was taken to the hospital his sodium was low he was told that why he had the seizure he was there for 6 days        History of Present Illness  TCM Communication [facility]: The patient is being contacted for follow-up after hospitalization  Hospital records were reviewed  He was hospitalized at [place]  The date of admission: 7/28/17, date of discharge: 8/5/17  Diagnosis: hyponatremia; anemia;syncope;thoracic spine FX; ETOH abuse  He was discharged to home  Communication performed and completed by   HPI: 48 yo male presents for above  He is in process of finding new specialists due to insurance coverage  He is doing better and is being followed by PT at home  He reports he also has home care following him  He continues to wear the back brace for the fracture  He continues to drink ETOH "but not as much as I used to"  Review of Systems  Complete-Male:   Constitutional: No fever or chills, feels well, no tiredness, no recent weight gain or weight loss  Eyes: No complaints of eye pain, no red eyes, no discharge from eyes, no itchy eyes  ENT: no complaints of earache, no hearing loss, no nosebleeds, no nasal discharge, no sore throat, no hoarseness  Cardiovascular: No complaints of slow heart rate, no fast heart rate, no chest pain, no palpitations, no leg claudication, no lower extremity  Respiratory: No complaints of shortness of breath, no wheezing, no cough, no SOB on exertion, no orthopnea or PND  Musculoskeletal: as noted in HPI  Active Problems     1  Abdominal fluid collection (789 59) (R18 8)   2  Abdominal pain (789 00) (R10 9)   3  Abdominal wound dehiscence, initial encounter (998 30) (T81 30XA)   4  Allergic rhinitis (477 9) (J30 9)   5  Anemia (285 9) (D64 9)   6  Arm skin lesion, left (709 9) (L98 9)   7  Cervical disc disorder with myelopathy (722 71) (M50 10)   8  Cervical post-laminectomy syndrome (722 81) (M96 1)   9  Cervical spinal cord injury (952 00) (S14 109A)   10  Cervicalgia (723 1) (M54 2)   11  Chronic obstructive pulmonary disease (496) (J44 9)   12  Depression, unspecified depression type (311) (F32 9)   13  Follow-up examination following surgery (V67 00) (Z09)   14   Left foot drop (736 79) (M21 372)   15  Left leg swelling (729 81) (M79 89)   16  Lumbago (724 2) (M54 5)   17  Lumbar radiculopathy (724 4) (M54 16)   18  Neuropathy involving both lower extremities (356 9) (G57 93)   19  Peripheral neuropathy (356 9) (G62 9)   20  Postoperative visit (V58 49) (Z48 89)   21  Screening for cancer (V76 9) (Z12 9)   22  Screening PSA (prostate specific antigen) (V76 44) (Z12 5)   23  Urinary retention (788 20) (R33 9)   24  Wedge compression fracture of T6 vertebra (805 2) (S22 050A)   25  Wedge compression fracture of T7 vertebra (805 2) (S22 060A)   26  Wound of back (876 0) (S21 209A)    Seizure disorder (345 90) (G40 909)          Past Medical History    1  History of Anxiety (300 00) (F41 9)   2  History of abnormal weight loss (V13 89) (Z87 898)   3  History of depression (V11 8) (Z86 59)   4  History of fatigue (V13 89) (Z87 898)   5  History of hypertension (V12 59) (Z86 79)   6  History of sleep disturbance (V13 89) (Z87 898)   7  Personal history of arthritis (V13 4) (Z87 39)   8  History of Substance abuse withdrawal (292 0) (F19 239)   9  History of Wears glasses (V49 89) (Z97 3)    Surgical History    1  History of Arthrodesis Cervical   2  History of Duodenum Perforated Ulcer Closure   3  History of Neck Repair   4  History of Neuroplasty Median Nerve At Carpal Tunnel   5  History of Rotator Cuff Repair  Surgical History Reviewed: The surgical history was reviewed and updated today  Family History  Mother    1  Family history of malignant neoplasm of breast (V16 3) (Z80 3)   2  No pertinent family history  Father    3  Family history of colorectal cancer (V16 0) (Z80 0)  Brother    4  Family history of myocardial infarction (V17 3) (Z82 49)  Family History Reviewed: The family history was reviewed and updated today         Social History    · Alcohol Use (History)   · Caffeine Use   · Current Every Day Smoker (305 1)   · Currently sexually active   · Disabled   · Denied: History of Drug use   · High school or GED   · No known STD risk factors   · Occasional alcohol use  Social History Reviewed: The social history was reviewed and updated today  The social history was reviewed and is unchanged  Current Meds   1  Acetaminophen 325 MG Oral Tablet; TAKE 1 TO 2 TABLETS EVERY 6 HOURS AS   NEEDED; Therapy: (Recorded:62Hbh5769) to Recorded   2  Albuterol Sulfate (2 5 MG/3ML) 0 083% Inhalation Nebulization Solution; USE 1 UNIT   DOSE EVERY 4-6 HOURS AS NEEDED FOR WHEEZING ; Therapy: 12FMU0304 to (Last Rx:20Jun2016)  Requested for: 20Jun2016 Ordered   3  Claritin 10 MG Oral Tablet; Take two daily; Therapy: (Recorded:32Wlw2790) to Recorded   4  Ensure Nutrition Shake Oral Liquid; USE AS DIRECTED; Therapy: 71HMO2740 to (Last Rx:07Jun2016)  Requested for: 07Jun2016 Ordered   5  Folic Acid 1 MG Oral Tablet; Take 1 tablet daily  Requested for: 30Aug2016; Last   Rx:30Aug2016 Ordered   6  Gabapentin 100 MG Oral Capsule; Take 1 capsule twice daily; Therapy: (Recorded:56Ixq8973) to Recorded   7  Keppra 750 MG Oral Tablet; One tablet every 12 hours; Therapy: (Recorded:08Saw1958) to Recorded   8  Lidocaine 5 % External Patch; APPLY 1 PATCH TO THE AFFECTED AREA AND LEAVE IN   PLACE FOR 12 HOURS, THEN REMOVE AND LEAVE OFF FOR 12 HOURS; Therapy: (Recorded:86Tfj8049) to Recorded   9  Lisinopril 10 MG Oral Tablet; take 1 tablet daily for blood pressure  Requested for:   20Jun2017; Last Rx:20Jun2017 Ordered   10  Omeprazole 20 MG CPCR; Therapy: (256 8915) to Recorded   11  Sodium Chloride 1 GM Oral Tablet; take 2 tablet twice daily; Therapy: (Recorded:80Okw7203) to Recorded   12  Thiamine HCl - 100 MG Oral Tablet; Therapy: (736 2153) to Recorded   13  TraMADol HCl - 50 MG Oral Tablet; Take 1 tablet twice daily; Therapy: (Recorded:03Xcy0328) to Recorded   14   Ventolin  (90 Base) MCG/ACT Inhalation Aerosol Solution; INHALE 1 PUFF    EVERY 4 HOURS AS NEEDED; Therapy: 88SPQ7904 to (Last Rx:57Fpi4723)  Requested for: 09QYX1134 Ordered   15  Vitamin B-12 1000 MCG Oral Tablet; TAKE 1 TABLET DAILY AS DIRECTED  Requested    for: 82Jbo8359; Last Rx:23Vrw4021 Ordered   16  Vitamin D 1000 UNIT Oral Tablet; TAKE 1 TABLET DAILY  Requested for: 58CKV9322;    Last Rx:58Qrx5584 Ordered  Medication List Reviewed: The medication list was reviewed and updated today  Allergies    1  No Known Drug Allergies    2  Seasonal    Vitals  Signs   Recorded: 00Yqq2933 09:18AM   Temperature: 96 8 F  Heart Rate: 224  Systolic: 379  Diastolic: 74  Height: 5 ft 5 5 in  Weight: 135 lb   BMI Calculated: 22 12  BSA Calculated: 1 68  O2 Saturation: 94    Physical Exam    Constitutional   General appearance: No acute distress, well appearing and well nourished  Eyes   Conjunctiva and lids: No swelling, erythema, or discharge  Pupils and irises: Equal, round and reactive to light  Ears, Nose, Mouth, and Throat   External inspection of ears and nose: Normal     Pulmonary   Respiratory effort: No increased work of breathing or signs of respiratory distress  Auscultation of lungs: Clear to auscultation, equal breath sounds bilaterally, no wheezes, no rales, no rhonci  Cardiovascular   Palpation of heart: Normal PMI, no thrills  Auscultation of heart: Abnormal   The heart rate was tachycardic  The rhythm was regular  Examination of extremities for edema and/or varicosities: Normal     Lymphatic   Palpation of lymph nodes in neck: No lymphadenopathy  Neurologic   Cranial nerves: Cranial nerves 2-12 intact      Psychiatric   Orientation to person, place and time: Normal     Mood and affect: Normal          Future Appointments    Date/Time Provider Specialty Site   12/13/2017 11:00 AM Mikki Duarte  Nephrology [facility]OLOGY [facility]   11/16/2017 09:30 AM Mikki Rogers Neurology [facility] NEUROLOGY [facility]   12/21/2017 08:30 AM Omkar Vargas [facility]     Signatures   Electronically signed by : CAROLYN Dennis;  Aug 18 2017  9:42AM EST                       (Author)    Electronically signed by : Maren Jack MD; Nov 10 2017  7:34PM EST                       (Author)

## 2018-01-10 NOTE — RESULT NOTES
Verified Results  (1) LEVETIRACETAM [facility]) [id number] 07:01AM Karen Highrizwan Order Number: [id number]     Test Name Result Flag Reference   LEVETIRACETAM (KEPPRA) 33 9 ug/mL  10 0 - 40 0   Performed at:  [address]  [id number]  : Ester Cheema MD, Phone:  [phone]

## 2018-01-10 NOTE — PROCEDURES
Procedures by Yehuda Caraballo MD at 2017   2:58 PM      Author:  Yehuda Caraballo MD Service:  Neurology Author Type:  Physician    Filed:  2017  3:02 PM Date of Service:  2017  2:58 PM Status:  Signed    :  Yehuda Caraballo MD (Physician)        Procedure Orders:       1  EEG Video Monitoring 24 Hour [id number] ordered by Yehuda Caraballo MD at 17 1036                  Continuous Video EEG Monitoring       Patient Name:  Tete Coyne  MRN: [id number]   :  1965 File #: LTM 16- 80   Age: 47 y o  Encounter #: [id number]   Study Start: 2017 08:00  Study End: 2017 10:01           Report date: 2017          Study type: Continuous video EEG    ICD 10 diagnosis: Spells/Fit NOS R56 9 and Encephalopathy, unspecified G93 40    -------------------------------------------------  Patient History: This recording was observed in a 47 y o  male  with history of alcoholism and seizures to evaluate for seizures as the cause of altered mental status  Medications include:     enoxaparin 40 mg Subcutaneous Daily   levETIRAcetam 750 mg Intravenous Q12H [address]   lidocaine 1 patch Transdermal Daily   LORazepam 1 mg Intravenous TID   pantoprazole 40 mg Intravenous Daily   potassium phosphate 30 mmol Intravenous Once   thiamine (VITAMIN B1) 50 mL IVPB 100 mg Intravenous Q24H       -------------------------------------------------  Description of Procedure:  32 channel digital recording with electrodes placed according to the International 10-20 system with additional T1/T2 electrodes,  EOG, EKG, and simultaneous video  A monitoring technologist supervised the continuous recording  The recording was technically satisfactory  -------------------------------------------------  Results:   Manual Review:   During wakefulness there was no posteriorly dominant rhythm that attenuated with eye opening   Instead, the background contained diffuse low amplitude beta activities and intermixed alpha activities  During sleep there were symmetric sleep spindles and low amplitude mixed frequency theta/delta activity present diffusely  Other findings:  Samples of the single channel ECG demonstrated a regular rhythm  Events:   None      -------------------------------------------------  Interpretation: This prolonged, continuous video-EEG recording is mildly abnormal      A background of low amplitude beta and alpha activities without clear posteriorly posteriorly dominant rhythm during wakefulness suggests possible mild nonspecific diffuse cerebral dysfunction  Enhanced beta activities are a medication effect related  to benzodiazepine administration  No electrographic seizures or interictal epileptiform discharges are seen  Erin Lorenzo MD   [address] [facility] Neurology [place] ROSE    Jul 30 2017  3:02PM [facility] Standard Time

## 2018-01-10 NOTE — MISCELLANEOUS
Provider Comments  Provider Comments:   Derek Mcwilliams did not show to his scheduled appointment today  He stated his wife had an emergency  His appointment is rescheduled for tomorrow        Signatures   Electronically signed by : SHEYLA Bergman ; Nov 18 2016  4:48PM EST                       (Review)

## 2018-01-11 NOTE — MISCELLANEOUS
Message  received a call from eliceo gallo [facility] nurse  she said patient refused any further services from [facility] so they won't be going in anymore  cleos phone # in case you want to talk to her [phone]      Active Problems    1  Abdominal fluid collection (789 59) (R18 8)   2  Abdominal pain (789 00) (R10 9)   3  Abdominal wound dehiscence, initial encounter (998 30) (T81 30XA)   4  Alcoholism (303 90) (F10 20)   5  Allergic rhinitis (477 9) (J30 9)   6  Anemia (285 9) (D64 9)   7  Arm skin lesion, left (709 9) (L98 9)   8  Cervical disc disorder with myelopathy (722 71) (M50 10)   9  Cervical post-laminectomy syndrome (722 81) (M96 1)   10  Cervical spinal cord injury (952 00) (S14 109A)   11  Cervicalgia (723 1) (M54 2)   12  Chronic obstructive pulmonary disease (496) (J44 9)   13  Depression, unspecified depression type (311) (F32 9)   14  Follow-up examination following surgery (V67 00) (Z09)   15  Hyponatremia (276 1) (E87 1)   16  Left foot drop (736 79) (M21 372)   17  Left leg swelling (729 81) (M79 89)   18  Lumbago (724 2) (M54 5)   19  Lumbar radiculopathy (724 4) (M54 16)   20  Neuropathy involving both lower extremities (356 9) (G57 93)   21  Peripheral neuropathy (356 9) (G62 9)   22  Postoperative visit (V58 49) (Z48 89)   23  Screening for cancer (V76 9) (Z12 9)   24  Screening PSA (prostate specific antigen) (V76 44) (Z12 5)   25  Seizure disorder (345 90) (G40 909)   26  Urinary retention (788 20) (R33 9)   27  Wedge compression fracture of T6 vertebra (805 2) (S22 050A)   28  Wedge compression fracture of T7 vertebra (805 2) (S22 060A)   29  Wound of back (876 0) (S21 209A)    Current Meds   1  Acetaminophen 325 MG Oral Tablet; TAKE 1 TO 2 TABLETS EVERY 6 HOURS AS   NEEDED; Therapy: (Recorded:93Hqs1452) to Recorded   2  Albuterol Sulfate (2 5 MG/3ML) 0 083% Inhalation Nebulization Solution; USE 1 UNIT   DOSE EVERY 4-6 HOURS AS NEEDED FOR WHEEZING ;    Therapy: 96AJU2083 to (Last Rx: 66IGM1365)  Requested for: 20Jun2016 Ordered   3  Claritin 10 MG Oral Tablet; Take two daily; Therapy: (Recorded:69Lpb6028) to Recorded   4  Ensure Nutrition Shake Oral Liquid; USE AS DIRECTED; Therapy: 79RWI5870 to (Last Rx:07Jun2016)  Requested for: 07Jun2016 Ordered   5  Folic Acid 1 MG Oral Tablet; Take 1 tablet daily  Requested for: 30Aug2016; Last   Rx:90Kxr4366 Ordered   6  Gabapentin 300 MG Oral Capsule; TAKE 1 CAPSULE 3 TIMES DAILY  Requested for:   18Aug2017; Last Rx:18Aug2017 Ordered   7  Keppra 750 MG Oral Tablet (LevETIRAcetam); One tablet every 12 hours; Therapy: (Recorded:24Shx8800) to Recorded   8  Lidocaine 5 % External Patch; APPLY 1 PATCH TO THE AFFECTED AREA AND LEAVE   IN PLACE FOR 12 HOURS, THEN REMOVE AND LEAVE OFF FOR 12 HOURS; Therapy: (Recorded:67Iaf6319) to Recorded   9  Lisinopril 10 MG Oral Tablet; take 1 tablet daily for blood pressure  Requested for:   20Jun2017; Last Rx:20Jun2017 Ordered   10  Omeprazole 20 MG CPCR; Therapy: (Olevia Polite) to Recorded   11  Sodium Chloride 1 GM Oral Tablet; take 3  tablet twice daily  Requested for: 28Aug2017;    Last Rx:54Oxv7788 Ordered   12  Thiamine HCl - 100 MG Oral Tablet; Therapy: (Olevia Polite) to Recorded   13  TraMADol HCl - 50 MG Oral Tablet; Take 1 tablet twice daily; Therapy: (Recorded:36Jtr4671) to Recorded   14  Ventolin  (90 Base) MCG/ACT Inhalation Aerosol Solution; INHALE 1 PUFF    EVERY 4 HOURS AS NEEDED; Therapy: 92TNI1997 to (Last Rx:42Rmr9570)  Requested for: 93HMU1396 Ordered   15  Vitamin B-12 1000 MCG Oral Tablet; TAKE 1 TABLET DAILY AS DIRECTED  Requested    for: 30Aug2016; Last Rx:33Gfg9243 Ordered   16  Vitamin D 1000 UNIT Oral Tablet; TAKE 1 TABLET DAILY  Requested for: 61DRQ3297;    Last Rx:07Jun2016 Ordered    Allergies    1  No Known Drug Allergies    2   Seasonal    Signatures   Electronically signed by : Jayce Calderón, ; Sep  5 1172  5:44PM EST (Author)

## 2018-01-12 VITALS
DIASTOLIC BLOOD PRESSURE: 92 MMHG | BODY MASS INDEX: 20.61 KG/M2 | HEART RATE: 80 BPM | WEIGHT: 136 LBS | OXYGEN SATURATION: 96 % | SYSTOLIC BLOOD PRESSURE: 140 MMHG | HEIGHT: 68 IN | TEMPERATURE: 96.8 F

## 2018-01-12 VITALS
SYSTOLIC BLOOD PRESSURE: 118 MMHG | WEIGHT: 144.13 LBS | BODY MASS INDEX: 23.16 KG/M2 | HEIGHT: 66 IN | HEART RATE: 68 BPM | RESPIRATION RATE: 16 BRPM | DIASTOLIC BLOOD PRESSURE: 60 MMHG

## 2018-01-13 VITALS
OXYGEN SATURATION: 94 % | WEIGHT: 135 LBS | SYSTOLIC BLOOD PRESSURE: 110 MMHG | DIASTOLIC BLOOD PRESSURE: 74 MMHG | TEMPERATURE: 96.8 F | HEIGHT: 66 IN | HEART RATE: 103 BPM | BODY MASS INDEX: 21.69 KG/M2

## 2018-01-13 VITALS
DIASTOLIC BLOOD PRESSURE: 88 MMHG | SYSTOLIC BLOOD PRESSURE: 120 MMHG | TEMPERATURE: 97.6 F | RESPIRATION RATE: 17 BRPM | WEIGHT: 124 LBS | HEIGHT: 68 IN | BODY MASS INDEX: 18.79 KG/M2 | OXYGEN SATURATION: 96 % | HEART RATE: 88 BPM

## 2018-01-13 VITALS
SYSTOLIC BLOOD PRESSURE: 122 MMHG | BODY MASS INDEX: 23.99 KG/M2 | DIASTOLIC BLOOD PRESSURE: 78 MMHG | WEIGHT: 144 LBS | HEART RATE: 92 BPM | RESPIRATION RATE: 20 BRPM | HEIGHT: 65 IN

## 2018-01-13 NOTE — PROCEDURES
Procedures by Tanja Jimenez MD at 2017   1:23 PM      Author:  Tanja Jimenez MD Service:  Neurology Author Type:  Physician    Filed:  2017  1:26 PM Date of Service:  2017  1:23 PM Status:  Signed    :  Tanja Jimenez MD (Physician)        Procedure Orders:       1  EEG Routine and awake [[id number]] ordered by  at 17 1027                  ELECTROENCEPHALOGRAM (EEG)      Patient Name:  Salty Carolina  MRN: [id number]   :  1965 File #: [facility] 13-80   Age: 47 y o  Encounter #: [id number]   Date performed: 2017            Report date: 2017          Study type: Routine EEG    ICD 10 diagnosis: Spells/Fit NOS R56 9 and Syncope R55    Start time:  End time:      -------------------------------------------------------------------------------------------------------------------   Patient History: This recording was performed in a 47 y o  male with recent syncope  and 2 apparent GTC seizures to evaluate for evidence of epilepsy  Medications include:   None  -------------------------------------------------------------------------------------------------------------------   Description of Procedure:  ·  32 channel digital recording with electrodes placed according to the International 10-20 system with additional  T1/T2 electrodes, EOG, EKG, and simultaneous  video  The recording was technically satisfactory  -------------------------------------------------------------------------------------------------------------------   EEG Description:    The recording was performed with the patient awake  He was oriented  During wakefulness, there were long runs of well regulated, low amplitude, posteriorly  dominant, symmetric 8 5-9 cps alpha rhythm that attenuated with eye opening  There were symmetric low amplitude, frontally dominant beta activities  Drowsiness and sleep were not attained       Activation Procedures:  Hyperventilation was performed for 3-4 minutes and did not produce any changes  Stepped photic stimulation between 1-3 0 cps induced brief mild symmetric photic driving  Other findings:  Samples of the single channel ECG demonstrated a regular rhythm  Events:   No significant push buttons were activated  -------------------------------------------------------------------------------------------------------------------   EEG Interpretation: This Routine EEG recorded during wakefulness is normal     A normal EEG does not exclude the diagnosis of epilepsy/seizure  If clinically indicated, a repeat EEG allowing for sleep, possibly after sleep deprivation or with prolonged recording, may provide additional  diagnostic information  Val Wright MD   [address] [facility] [place] ROSE    Nov 21 2017  1:27PM [facility] Standard Time

## 2018-01-13 NOTE — PROGRESS NOTES
History of Present Illness  Care Coordination Encounter Information:   Type of Encounter: Telephonic    Spoke to Patient  Care Coordination  Nurse [address] [place] [address] [address]:   The reason for call is to discuss outreach for follow up/needed services  Called patient for follow up to be seen at surgeons office after hospital discharge on 11/1  Patient did state he just made an appointment to be seen this month by surgeon  Asked him about the [facility] missed appointment on 11/3 and he stated he contacted the office this am and someone should be out today to see him  He did say he felt his drain is blocked, I asked him if he reported this to the surgeons office, he did not report this  Advised him to report this to surgeons office, he is awaiting [facility] to see him  He has my contact information if any needs arise, will continue to monitor the situation  Active Problems    1  Allergic rhinitis (477 9) (J30 9)   2  Anemia (285 9) (D64 9)   3  Arm skin lesion, left (709 9) (L98 9)   4  Cervical disc disorder with myelopathy (722 71) (M50 10)   5  Cervical post-laminectomy syndrome (722 81) (M96 1)   6  Cervical spinal cord injury (952 00) (S14 109A)   7  Cervicalgia (723 1) (M54 2)   8  Chronic obstructive pulmonary disease (496) (J44 9)   9  Follow-up examination following surgery (V67 00) (Z09)   10  Left foot drop (736 79) (M21 372)   11  Left leg swelling (729 81) (M79 89)   12  Lumbago (724 2) (M54 5)   13  Lumbar radiculopathy (724 4) (M54 16)   14  Peripheral neuropathy (356 9) (G62 9)    Past Medical History    1  History of Anxiety (300 00) (F41 9)   2  History of abnormal weight loss (V13 89) (Z87 898)   3  History of depression (V11 8) (Z86 59)   4  History of fatigue (V13 89) (Z87 898)   5  History of hypertension (V12 59) (Z86 79)   6  History of sleep disturbance (V13 89) (Z87 898)   7  Personal history of arthritis (V13 4) (Z87 39)   8  History of Wears glasses (V49 89) (Z97 3)    Surgical History    1   History of Neuroplasty Median Nerve At Carpal Tunnel   2  History of Rotator Cuff Repair    Family History  Mother    1  No pertinent family history  Family History    2  No pertinent family history    Social History    · Alcohol Use (History)   · Caffeine Use   · Current Every Day Smoker (305 1)   · Currently sexually active   · Denied: History of Drug use   · No known STD risk factors   · Occasional alcohol use    Current Meds    1  Albuterol Sulfate (2 5 MG/3ML) 0 083% Inhalation Nebulization Solution; USE 1 UNIT   DOSE EVERY 4-6 HOURS AS NEEDED FOR WHEEZING ; Therapy: 11GSN4656 to (Last Rx:20Jun2016)  Requested for: 20Jun2016 Ordered   2  Allergy Relief 10 MG Oral Tablet Dispersible; TAKE 1 TABLET DAILY AS DIRECTED; Therapy: 37Koi2625 to (Violet Actis)  Requested for: 30Aug2016; Last   Rx:85Zkg1974 Ordered   3  Flonase Allergy Relief 50 MCG/ACT Nasal Suspension (Fluticasone Propionate); use 2   sprays in each nostril once daily; Therapy: 03Fof3341 to (Last Rx:30Aug2016)  Requested for: 62Qnt8139 Ordered   4  FLUoxetine HCl - 20 MG Oral Capsule; TAKE 1 CAPSULE DAILY; Last Rx:07Jun2016   Ordered   5  Loratadine 10 MG Oral Tablet; Take 1 tablet daily  Requested for: 70HBX8018; Last   Rx:07Jun2016 Ordered   6  Zaditor 0 025 % Ophthalmic Solution; INSTILL 1 DROP IN THE AFFECTED EYE(S)   EVERY 12 HOURS AS NEEDED; Therapy: 61Dqk7009 to (Last Rx:04Cnt9633)  Requested for: 99Pbk6429 Ordered    7  Folic Acid 1 MG Oral Tablet; Take 1 tablet daily  Requested for: 75Bcd1282; Last   Rx:96Rrw5689 Ordered    8  Ventolin  (90 Base) MCG/ACT Inhalation Aerosol Solution; INHALE 1 PUFF   EVERY 4 HOURS AS NEEDED; Therapy: 28XOQ3742 to (Last Rx:12Gph7198)  Requested for: 88AAB9132 Ordered    9  Vitamin D 1000 UNIT Oral Tablet; TAKE 1 TABLET DAILY  Requested for: 89VTU4574;   Last Rx:07Jun2016 Ordered    10  Ensure Nutrition Shake Oral Liquid; USE AS DIRECTED;     Therapy: 99OCZ9850 to (Last Rx:07Jun2016)  Requested for: 02DOH4570 Ordered   11  Lisinopril 10 MG Oral Tablet; TAKE 1 TABLET DAILY FOR BLOOD PRESSURE     Requested for: 84UWH3219; Last Rx:07Jun2016 Ordered   12  Vitamin B-12 1000 MCG Oral Tablet; TAKE 1 TABLET DAILY AS DIRECTED  Requested    for: 23Uxi2010; Last Rx:02Wpp7763 Ordered    13  Furosemide 20 MG Oral Tablet (Lasix); 1 tablet 3x's a day for 3 days; Therapy: 68Gdz4593 to (Last Rx:17Fjh2032)  Requested for: 02Aug2016 Ordered   14  Furosemide 20 MG Oral Tablet (Lasix); TAKE 1 TABLET DAILY AS DIRECTED; Therapy: 20RZT4887 to (Ro Grijalva)  Requested for: 32GQR4031; Last    Rx:07Jun2016 Ordered    15  OxyCODONE HCl - 5 MG Oral Tablet; TAKE 1 TABLET Bedtime; Therapy: 18DKP8730 to (Evaluate:96Dec1036); Last Rx:07Jun2016 Ordered    16  Magnesium Oxide 400 MG Oral Tablet; TAKE 1 TABLET DAILY; Therapy: (Recorded:72Hlu3268) to Recorded   17  Potassium Chloride 10 MEQ TBCR; Take 1 tablet twice daily; Therapy: (Recorded:53Akj4266) to Recorded   18  Thera-M Vitamin TABS; TAKE 1 TABLET DAILY; Therapy: (Recorded:61Tfb1415) to Recorded    Allergies    1  No Known Drug Allergies    End of Encounter Meds    1  Albuterol Sulfate (2 5 MG/3ML) 0 083% Inhalation Nebulization Solution; USE 1 UNIT   DOSE EVERY 4-6 HOURS AS NEEDED FOR WHEEZING ; Therapy: 74VZW9847 to (Last Rx:20Jun2016)  Requested for: 20Jun2016 Ordered   2  Allergy Relief 10 MG Oral Tablet Dispersible; TAKE 1 TABLET DAILY AS DIRECTED; Therapy: 53Osi2457 to (Yodit Bull)  Requested for: 07Lsi7021; Last   Rx:46Ino9715 Ordered   3  Flonase Allergy Relief 50 MCG/ACT Nasal Suspension (Fluticasone Propionate); use 2   sprays in each nostril once daily; Therapy: 08Spb9065 to (Last Rx:04Akx0730)  Requested for: 53Sjv8462 Ordered   4  FLUoxetine HCl - 20 MG Oral Capsule; TAKE 1 CAPSULE DAILY; Last Rx:07Jun2016   Ordered   5  Loratadine 10 MG Oral Tablet; Take 1 tablet daily  Requested for: 39WNA4519; Last   Rx:07Jun2016 Ordered   6   Lb Link 0  025 % Ophthalmic Solution; INSTILL 1 DROP IN THE AFFECTED EYE(S)   EVERY 12 HOURS AS NEEDED; Therapy: 86Dgu0497 to (Last Rx:98Wya0045)  Requested for: 30Aug2016 Ordered    7  Folic Acid 1 MG Oral Tablet; Take 1 tablet daily  Requested for: 30Aug2016; Last   Rx:50Ghs0717 Ordered    8  Ventolin  (90 Base) MCG/ACT Inhalation Aerosol Solution; INHALE 1 PUFF   EVERY 4 HOURS AS NEEDED; Therapy: 37AKR4278 to (Last Rx:44Oab1060)  Requested for: 90AKX6689 Ordered    9  Vitamin D 1000 UNIT Oral Tablet; TAKE 1 TABLET DAILY  Requested for: 41NQY7958;   Last Rx:07Jun2016 Ordered    10  Ensure Nutrition Shake Oral Liquid; USE AS DIRECTED; Therapy: 36ZGL8572 to (Last Rx:07Jun2016)  Requested for: 07Jun2016 Ordered   11  Lisinopril 10 MG Oral Tablet; TAKE 1 TABLET DAILY FOR BLOOD PRESSURE     Requested for: 95MMH8386; Last Rx:07Jun2016 Ordered   12  Vitamin B-12 1000 MCG Oral Tablet; TAKE 1 TABLET DAILY AS DIRECTED  Requested    for: 30Aug2016; Last Rx:30Aug2016 Ordered    13  Furosemide 20 MG Oral Tablet (Lasix); 1 tablet 3x's a day for 3 days; Therapy: 72Mpf5946 to (Last Rx:87Zso4031)  Requested for: 02Aug2016 Ordered   14  Furosemide 20 MG Oral Tablet (Lasix); TAKE 1 TABLET DAILY AS DIRECTED; Therapy: 53OQN3562 to ()  Requested for: 90FXN6548; Last    Rx:07Jun2016 Ordered    15  OxyCODONE HCl - 5 MG Oral Tablet; TAKE 1 TABLET Bedtime; Therapy: 43YVQ0297 to (Evaluate:68Wwt9408); Last Rx:07Jun2016 Ordered    16  Magnesium Oxide 400 MG Oral Tablet; TAKE 1 TABLET DAILY; Therapy: (Recorded:29Zhu8868) to Recorded   17  Potassium Chloride 10 MEQ TBCR; Take 1 tablet twice daily; Therapy: (Recorded:57Pjt1775) to Recorded   18  Thera-M Vitamin TABS; TAKE 1 TABLET DAILY; Therapy: (Recorded:83Sql8409) to Recorded    Future Appointments    Date/Time Provider Specialty Site   11/14/2016 01:00 PM SHEYLA King   [address] [place] [address]     Patient Care Team    Care Team Member Role Specialty Office Number   Cori ROSE   Specialist Neurosurgery [phone] [address] [facility] [phone]   [place] [address] [phone]     Signatures   Electronically signed by : Mino Amos, ; Nov 4 2016 11:10AM EST                       (Author)

## 2018-01-13 NOTE — MISCELLANEOUS
Message  spoke with patients wife today, She called wanting to know what was going on with her , she was told he was getting surgery today  I called and spoke with patients inpatient nurse she said no surgery was planned, Dr Jacquelin Mota was only told to re-consult the patient  When I called the wife back, she was very upset and stated that her  is very sick and something needs to be done  she stated she called a medical law firm and somebody from Dr Twyla Goss practice must call her back ASAP to let her know what is going on  Spoke with Mitchellstefan and he is calling wife back to talk with her  Active Problems    1  Abdominal pain (789 00) (R10 9)   2  Abdominal wound dehiscence, initial encounter (998 30) (T81 30XA)   3  Allergic rhinitis (477 9) (J30 9)   4  Anemia (285 9) (D64 9)   5  Arm skin lesion, left (709 9) (L98 9)   6  Cervical disc disorder with myelopathy (722 71) (M50 10)   7  Cervical post-laminectomy syndrome (722 81) (M96 1)   8  Cervical spinal cord injury (952 00) (S14 109A)   9  Cervicalgia (723 1) (M54 2)   10  Chronic obstructive pulmonary disease (496) (J44 9)   11  Depression, unspecified depression type (311) (F32 9)   12  Follow-up examination following surgery (V67 00) (Z09)   13  Left foot drop (736 79) (M21 372)   14  Left leg swelling (729 81) (M79 89)   15  Lumbago (724 2) (M54 5)   16  Lumbar radiculopathy (724 4) (M54 16)   17  Peripheral neuropathy (356 9) (G62 9)   18  Postoperative visit (V58 49) (Z48 89)   19  Screening for cancer (V76 9) (Z12 9)    Current Meds   1  Albuterol Sulfate (2 5 MG/3ML) 0 083% Inhalation Nebulization Solution; USE 1 UNIT   DOSE EVERY 4-6 HOURS AS NEEDED FOR WHEEZING ; Therapy: 30GOI7065 to (Last Rx:20Jun2016)  Requested for: 20Jun2016 Ordered   2  DULoxetine HCl - 30 MG Oral Capsule Delayed Release Particles; TAKE ONE CAPSULE   BY MOUTH EVERY DAY;    Therapy: 52WHX5922 to (Evaluate:21Jun2017)  Requested for: 70Qin3710; Chava Leigh Ordered   3  Ensure Nutrition Shake Oral Liquid; USE AS DIRECTED; Therapy: 97ZGS8669 to (Last Rx:07Jun2016)  Requested for: 07Jun2016 Ordered   4  Folic Acid 1 MG Oral Tablet; Take 1 tablet daily  Requested for: 95Azx0140; Last   Rx:11Wtj0246 Ordered   5  Lisinopril 10 MG Oral Tablet; TAKE 1 TABLET DAILY FOR BLOOD PRESSURE    Requested for: 74JZQ0858; Last Rx:07Jun2016 Ordered   6  Loratadine 10 MG Oral Tablet; Take 1 tablet daily  Requested for: 91QCC4249; Last   Rx:07Jun2016 Ordered   7  Magnesium Oxide 400 MG Oral Tablet; TAKE 1 TABLET DAILY; Therapy: (Recorded:67Ndw5411) to Recorded   8  Omeprazole 20 MG CPCR; Therapy: (Refugio Joshi) to Recorded   9  OxyCODONE HCl - 5 MG Oral Tablet; TAKE 1 TABLET Bedtime; Therapy: 31OZF4174 to (Evaluate:43Exp0873); Last Rx:07Jun2016 Ordered   10  Poly-Iron 150 Forte CAPS; Therapy: (Refugio Joshi) to Recorded   11  Thera-M Vitamin TABS; TAKE 1 TABLET DAILY; Therapy: (Recorded:94Qgz0103) to Recorded   12  Thiamine HCl - 100 MG Oral Tablet; Therapy: (Refugio Joshi) to Recorded   13  Ventolin  (90 Base) MCG/ACT Inhalation Aerosol Solution; INHALE 1 PUFF    EVERY 4 HOURS AS NEEDED; Therapy: 95YMN3104 to (Last Rx:49Ikz9011)  Requested for: 95SEQ0994 Ordered   14  Vitamin B-12 1000 MCG Oral Tablet; TAKE 1 TABLET DAILY AS DIRECTED  Requested    for: 79Tdd4589; Last Rx:74Wjl5000 Ordered   15  Vitamin D 1000 UNIT Oral Tablet; TAKE 1 TABLET DAILY  Requested for: 67CCJ3095;    Last Rx:07Jun2016 Ordered    Allergies    1   No Known Drug Allergies    Signatures   Electronically signed by : Tamera Huddleston, ; Mar 13 2017  9:36AM EST                       (Author)

## 2018-01-14 VITALS
SYSTOLIC BLOOD PRESSURE: 122 MMHG | HEART RATE: 95 BPM | OXYGEN SATURATION: 97 % | TEMPERATURE: 98.3 F | HEIGHT: 66 IN | WEIGHT: 132 LBS | DIASTOLIC BLOOD PRESSURE: 78 MMHG | RESPIRATION RATE: 20 BRPM | BODY MASS INDEX: 21.21 KG/M2

## 2018-01-16 NOTE — RESULT NOTES
Verified Results  * CT ABDOMEN PELVIS W CONTRAST [id number] 08:25AM Mary Salazar     Test Name Result Flag Reference   CT ABDOMEN PELVIS W CONTRAST (Report)     CT ABDOMEN AND PELVIS WITH IV CONTRAST     INDICATION: Bowel perforation with repair  Nonhealing of the incision  Abdominal pain and bloating  COMPARISON: 11/25/2016  TECHNIQUE: CT examination of the abdomen and pelvis was performed  Axial, sagittal and coronal reformatted projections were created  This examination, like all CT scans performed in the [facility], was performed utilizing    techniques to minimize radiation dose exposure, including the use of iterative reconstruction and automated exposure control  IV Contrast: iohexol (OMNIPAQUE) 350 MG/ML injection (MULTI-DOSE) 100 mL Note: (SINGLE DOSE/MULTI DOSE) information refers to the container from which the contrast was acquired  Contrast was injected one time intravenously without immediate    complication  Enteric Contrast: Enteric contrast was administered  FINDINGS:   ABDOMEN     LOWER CHEST: No significant abnormalities identified in the lower chest      LIVER/BILIARY TREE: Unremarkable  GALLBLADDER: No calcified gallstones  No pericholecystic inflammatory change  SPLEEN: Calcified granulomas noted  PANCREAS: Unremarkable  ADRENAL GLANDS: Unremarkable  KIDNEYS/URETERS: Unremarkable  No hydronephrosis  STOMACH AND BOWEL: Unremarkable  APPENDIX: No findings to suggest appendicitis  ABDOMINOPELVIC CAVITY: No ascites or free intraperitoneal air  No lymphadenopathy  VESSELS: Unremarkable for patient's age  PELVIS     REPRODUCTIVE ORGANS: Unremarkable for patient's age  URINARY BLADDER: Unremarkable  ABDOMINAL WALL/INGUINAL REGIONS: Interval removal of midline surgical staples  Previously there was a ventral midline subcutaneous fluid collection in this region that has since resolved   No evidence of abscess or other complication at the    postsurgical site  OSSEOUS STRUCTURES: No acute fracture or destructive osseous lesion  IMPRESSION:   1  Interval removal of ventral midline surgical staples and resolution of adjacent subcutaneous fluid collection  No evidence of abscess or other complication after surgical intervention  2  No residual postoperative ileus  3  Resolved pericholecystic         Workstation performed: [id number]     Signed by:   Lashonda Arguello MD   12/23/16

## 2018-01-16 NOTE — PROGRESS NOTES
Assessment    1  Wedge compression fracture of T6 vertebra (805 2) (S22 050A)   2  Wedge compression fracture of T7 vertebra (805 2) (S22 060A)    Plan    · *1 - [facility] Evaluation and Treatment  Eval and treat  History of seizure  Patient was seen by [facility] Olive[facility] Neurology in hospital  Status: Active  Requested for:  06FCM2078   Ordered; For: Seizure disorder; Ordered By: Marlee Denis Performed:  Due: 92IEI7072  Care Summary provided  : Yes    · XR SPINE THORACIC 2 VIEW; Status:Active; Requested for:40Zhw6458;    Perform:[facility] [address] [facility]; Order Comments:Upright AP, Lateral views (f/u T6 and T7 fractures); Due:03Srh1896; Ordered; For:Wedge compression fracture of T6 vertebra, Wedge compression fracture of T7 vertebra; Ordered By:Messi Bee;   · Follow-up Visit in 4 Weeks Evaluation and Treatment  Follow-up  sovl / snplx Dr Dakota Cruz with T-spine xray  Status: Complete  Done: 76XQX7925   Ordered; For: Wedge compression fracture of T6 vertebra, Wedge compression fracture of T7 vertebra; Ordered By: Marlee Denis Performed:  Due: 55EGK0951; Last Updated By: Loraine Rodriguez; 8/16/2017 8:18:22 AM    · 1 - John Carson DO General Surgery Evaluation and Treatment  Wound Care Eval  and treat  (Closed wound on upper back)  Status: Active  Requested for: 69MRK9943   Ordered; For: Wound of back; Ordered By: Marlee Denis Performed:  Due: 31IQK0845  Care Summary provided  : Yes    Discussion/Summary    This is a 47year old male with history of ACDF/PCDF and central cord injury s/p fall in 2014 who presents for 2 week hospital follow up for T6 and T7 compression fractures s/p fall at home prior to hospitalization at end of July 2017  The T-spine fractures are being managed in TLSO brace  He had a seizure during recent hospitalization for which he was seen by Neurology in hospital and is to f/u outpatient with Neurology  He also has known peripheral neuropathy      He reports compliance with wearing TLSO brace although has been wearing strap over shoulders  I demonstrated to patient how to properly wear TLSO brace with axillary straps under arm pits and how to snug the TLSO brace  Follow up T-spine xray (8/12/17) reports unchanged compression deformities of T6 and T7  Not reported specifically but upon reviewing the T-spine xray there may be some increased kyphosis of mid T-spine around/above the region of the compression fractures  I discussed Mr Batista Child case and reviewed his T-spine X-ray (8/12/17) and compared to previous imaging with Dr Beth Vasquez  At this juncture continued conservative treatment with TLSO brace is advised  Patient may have brace off to shower but otherwise is to wear TLSO brace when upright more then 45 degrees and out of bed  Patient is recommended to follow up in 4 weeks with new T-spine x-ray for continued surveillance of thoracic alignment  He is to refrain from strenuous activity  He is advised no lifting / pulling or pushing more then 5-10 lbs  He is to refrain from bending and twisting his back  He is advised follow up with his PCP for ongoing management of his pain medication (ie  Tylenol, Tramadol, Gabapentin)  His PCP may want to consider increasing dose of the Gabapentin and he was advised to discuss this further with PCP  He is advised to contact our office or present to ER if experiences worsening symptoms or neurological change  Patient is advised to see a Wound Care specialist for wound on upper back (this is inferior / lateral to right of his posterior cervical scar)  The TLSO brace does not touch or cover this region  He reports home VNA has been managing this wound with dressings  Mr Katty Mendez has seen Dr Chapo Johnson of Wound Care in the past so I have provided him a referral back to see Dr Chapo Johnson for the upper back wound      Additionally, patient is advised follow up with [facility] as was recommended during hospitalization for follow up for history of seizure  Patient reports he remains on Keppra  I was informed by my  today that our practice does not participate with Mr Earline Bustamante current insurance  She spoke with Mr Huddleston and informed Mr Huddleston that he would have to change his insurance if he wished to have an insurance that participates with our office  Mr Huddleston reported that he wishes to keep his current insurance company and he will locate and establish follow up with a neurosurgical practice that participates with his current insurance  He was advised to notify our office of the neurosurgical practice he will be following up with instead and our office can then forward his records to that neurosurgical practice  Mr Huddleston expressed understanding and agreement  The patient was counseled regarding diagnostic results, instructions for management, impressions  The patient has the current Goals: Review t-spine xray  The patent has the current Barriers: History of previous c-spine injury with central cord syndrome  hx of peripheral neuropathy    Insurance barrier  Patient is able to Self-Care  The treatment plan was reviewed with the patient/guardian  The patient/guardian understands and agrees with the treatment plan      Chief Complaint  Patient presents for 2 week hospital f/u for T-spine fractures  History of Present Illness  Mr Huddleston is a 47year old male who presents for hospital consult (7/29/17) follow up for T6 and T7 fractures  s/p Thoracic spine xray 8/12/17  In review, Mr Huddleston has history of frequent repeated prior hospital admissions  He has extensive alcohol abuse history  He was hospitalized at [facility] 7/28/17 - 8/5/17 s/p syncopal event and fall  Per hospital records he had a witnessed GTC seizure during hospitalization  He had hyponatremia as well  CT head was reportedly negative for surgical pathology   He was found to have compression fractures of T6 and T7 of unknown chronicity although were not present on CTA study from 5/21/16  He was suggested to wear TLSO brace as needed for pain  He underwent T-spine x-ray which demonstrated unchanged T6 compression fracture but increase in compression of T7  He was advised ongoing management with TLSO brace  He has known prior history of central cord injury after a fall in 2014 and is s/p both an anterior and posterior cervical decompression fusion (ACDF ~ July 2014 at [facility]  PCDF by Dr Lavon Hidalgo 8/2014)  CT of cervical spine was without changes relative to prior studies -- prior ACDF C5-C7 and PCDF C4-T1  He had C-spine flex/ext xray which showed no instability  Did show T1 screw fracture of long standing  No intervention was indicated at that time and c-collar was cleared  He was seen by Neurology for history of the seizure and was recommended to continue on Keppra and follow up outpatient with Neurology  Patient had requested T-spine xray and presents for follow up  Patient reports he has been wearing the TLSO brace essentially all the time  He reports he sleeps sitting up  He has axillary straps of the TLSO brace over his shoulders  He reports constant aching pain located lower thoracic spine region and quite often shooting pain up left flank region towards neck  He reports his back pain ranges from 6-10/10 on pain scale and currently rates as 10/10 on pain scale  He reports aggravating factors include sitting and anything else besides standing  He reports he is most comfortable when standing  He reports he has been taking Tramadol 50mg 1 tab q 6 hrs, Tylenol 325mg 1 tab q 6 hrs, and Gabapentin 100mg BID  He reports he has been on Gabapentin 100mg BID prior to hospitalization for about 3 years     He reports "neuropathy" with numbness and tingling of his distal arms/hands and lower legs below knees and both feet which he reports has been stable since his cervical spinal cord injury in 2014 although then notes he thinks pain has progressed some in both feet  He has had EMG/NCS 10/6/2014 that demonstrated peripheral neuropathy, cervical and lumbar radiculopathy, and left carpal tunnel syndrome  He reports occasional weakness of his lower extremities  He ambulates independent today  He reports he sometimes uses a cane over last 3 years since his c-spine injury in 2014  He denies bladder or bowel incontinence  He reports he is receiving home visiting nursing services  He reports nurse has been placing a dressing over upper back wound  (the TLSO brace does not touch this region)  He reports he has follow up with his PCP (Dr Luisito Cruz) on 8/19/17  Patient reports he remains on Keppra  He does not have follow up arranged yet with Neurology  He reports he is not driving  Review of Systems    Constitutional: no fever, not feeling poorly, no recent weight gain, no chills and no recent weight loss    The patient presents with complaints of feeling tired (difficulty sleeping due to back pain)  Eyes: No complaints of eye pain, no red eyes, no discharge from eyes, no itchy eyes  ENT: no complaints of earache, no hearing loss, no nosebleeds, no nasal discharge, no sore throat, no hoarseness  Cardiovascular: No complaints of slow heart rate, no fast heart rate, no chest pain, no palpitations, no leg claudication, no lower extremity  Respiratory: shortness of breath during exertion, but no orthopnea and no PND    The patient presents with complaints of cough (due to smoking)  The patient presents with complaints of wheezing (has albuterol inhaler)   Gastrointestinal: no abdominal pain, no nausea, no vomiting, no diarrhea and no blood in stools    The patient presents with complaints of constipation (x couple days)  Genitourinary: No complaints of dysuria, no incontinence, no hesitancy, no nocturia, no genital lesion, no testicular pain     Musculoskeletal: joint swelling (both ankles swell, left ankle more so than right ankle), myalgias and joint stiffness, but no arthralgias, no limb pain and no limb swelling  Integumentary: itching (both feet ), but no rashes, no skin lesions and no skin wound    The patient presents with complaints of dry skin (both feet)  Neurological: numbness (in both lower extremities from knee down  In UEs from elbow to fingertips  Patient reports feet are the most severe being numb and legs worse than arm), tingling (ankles and legs tingle, gets a little bit in finger tips) and difficulty walking, but no headache, no confusion, no dizziness, no limb weakness, no convulsions and no fainting  Psychiatric: sleep disturbances (difficulty sleeping due to pain, waking up every hour due to mid back pain), but not suicidal, no anxiety, no personality change, no depression and no emotional problems  Endocrine: No complaints of proptosis, no hot flashes, no muscle weakness, no erectile dysfunction, no deepening of the voice, no feelings of weakness  Hematologic/Lymphatic: No complaints of swollen glands, no swollen glands in the neck, does not bleed easily, no easy bruising  ROS reviewed  Active Problems    1  Abdominal fluid collection (789 59) (R18 8)   2  Abdominal pain (789 00) (R10 9)   3  Abdominal wound dehiscence, initial encounter (998 30) (T81 30XA)   4  Allergic rhinitis (477 9) (J30 9)   5  Anemia (285 9) (D64 9)   6  Arm skin lesion, left (709 9) (L98 9)   7  Cervical disc disorder with myelopathy (722 71) (M50 10)   8  Cervical post-laminectomy syndrome (722 81) (M96 1)   9  Cervical spinal cord injury (952 00) (S14 109A)   10  Cervicalgia (723 1) (M54 2)   11  Chronic obstructive pulmonary disease (496) (J44 9)   12  Depression, unspecified depression type (311) (F32 9)   13  Follow-up examination following surgery (V67 00) (Z09)   14  Left foot drop (736 79) (M21 372)   15  Left leg swelling (729 81) (M79 89)   16  Lumbago (724 2) (M54 5)   17   Lumbar radiculopathy (724 4) (M54 16)   18  Neuropathy involving both lower extremities (356 9) (G57 93)   19  Peripheral neuropathy (356 9) (G62 9)   20  Postoperative visit (V58 49) (Z48 89)   21  Screening for cancer (V76 9) (Z12 9)   22  Screening PSA (prostate specific antigen) (V76 44) (Z12 5)   23  Seizure disorder (345 90) (G40 909)   24  Thoracic back pain (724 1) (M54 6)   25  Urinary retention (788 20) (R33 9)   26  Wedge compression fracture of T6 vertebra (805 2) (S22 050A)   27  Wedge compression fracture of T7 vertebra (805 2) (S22 060A)    Past Medical History    1  History of Anxiety (300 00) (F41 9)   2  History of abnormal weight loss (V13 89) (Z87 898)   3  History of depression (V11 8) (Z86 59)   4  History of fatigue (V13 89) (Z87 898)   5  History of hypertension (V12 59) (Z86 79)   6  History of sleep disturbance (V13 89) (Z87 898)   7  History of Hyponatremia (276 1) (E87 1)   8  Personal history of arthritis (V13 4) (Z87 39)   9  History of Substance abuse withdrawal (292 0) (F19 239)   10  History of Wears glasses (V49 89) (Z97 3)    The active problems and past medical history were reviewed and updated today  Surgical History    1  History of Arthrodesis Cervical   2  History of Duodenum Perforated Ulcer Closure   3  History of Neck Repair   4  History of Neuroplasty Median Nerve At Carpal Tunnel   5  History of Rotator Cuff Repair    The surgical history was reviewed and updated today  Family History  Mother    1  Family history of malignant neoplasm of breast (V16 3) (Z80 3)  Father    2  Family history of colorectal cancer (V16 0) (Z80 0)  Brother    3  Family history of myocardial infarction (V17 3) (Z82 49)    The family history was reviewed and updated today         Social History    · Alcohol Use (History)   · Caffeine Use   · Current Every Day Smoker (305 1)   · Currently sexually active   · Disabled   · Denied: History of Drug use   · High school or GED   · No known STD risk factors   · Occasional alcohol use  The social history was reviewed and updated today  Current Meds   1  Acetaminophen 325 MG Oral Tablet; TAKE 1 TO 2 TABLETS EVERY 6 HOURS AS   NEEDED; Therapy: (Recorded:95Wsd4670) to Recorded   2  Albuterol Sulfate (2 5 MG/3ML) 0 083% Inhalation Nebulization Solution; USE 1 UNIT   DOSE EVERY 4-6 HOURS AS NEEDED FOR WHEEZING ; Therapy: 01VKK5809 to (Last Rx:20Jun2016)  Requested for: 20Jun2016 Ordered   3  Claritin 10 MG Oral Tablet; Take two daily; Therapy: (Recorded:50Xqa6727) to Recorded   4  Ensure Nutrition Shake Oral Liquid; USE AS DIRECTED; Therapy: 35TZU6177 to (Last Rx:07Jun2016)  Requested for: 07Jun2016 Ordered   5  Folic Acid 1 MG Oral Tablet; Take 1 tablet daily  Requested for: 30Aug2016; Last   Rx:31Otv4224 Ordered   6  Gabapentin 100 MG Oral Capsule; Take 1 capsule twice daily; Therapy: (Recorded:40Muj4050) to Recorded   7  Keppra 750 MG Oral Tablet; One tablet every 12 hours; Therapy: (Recorded:35Glk7694) to Recorded   8  Lidocaine 5 % External Patch; APPLY 1 PATCH TO THE AFFECTED AREA AND LEAVE IN   PLACE FOR 12 HOURS, THEN REMOVE AND LEAVE OFF FOR 12 HOURS; Therapy: (Recorded:41Yne3468) to Recorded   9  Lisinopril 10 MG Oral Tablet; take 1 tablet daily for blood pressure  Requested for:   20Jun2017; Last Rx:20Jun2017 Ordered   10  Omeprazole 20 MG CPCR; Therapy: (790 8997) to Recorded   11  Sodium Chloride 1 GM Oral Tablet; take 2 tablet twice daily; Therapy: (Recorded:79Jtj5324) to Recorded   12  Thiamine HCl - 100 MG Oral Tablet; Therapy: (907 2922) to Recorded   13  TraMADol HCl - 50 MG Oral Tablet; Take 1 tablet twice daily; Therapy: (Recorded:52Jzh6088) to Recorded   14  Ventolin  (90 Base) MCG/ACT Inhalation Aerosol Solution; INHALE 1 PUFF    EVERY 4 HOURS AS NEEDED; Therapy: 00HAW3191 to (Last Rx:06Jqg8072)  Requested for: 55BJC8460 Ordered   15   Vitamin B-12 1000 MCG Oral Tablet; TAKE 1 TABLET DAILY AS DIRECTED Requested    for: 65Okm9050; Last Rx:48Swc9006 Ordered   16  Vitamin D 1000 UNIT Oral Tablet; TAKE 1 TABLET DAILY  Requested for: 34VGB3682;    Last Rx:21Xiq9957 Ordered    The medication list was reviewed and updated today  Allergies    1  No Known Drug Allergies    2  Seasonal    Vitals  Vital Signs    Recorded: 15Aug2017 01:52PM   Temperature 98 2 F, Tympanic   Heart Rate 108, R Radial   Respiration 16   Systolic 507, LUE, Sitting   Diastolic 86, LUE, Sitting   Height 5 ft 5 5 in   Weight 140 lb 9 6 oz   BMI Calculated 23 04   BSA Calculated 1 71   Pain Scale 10     Physical Exam     Constitutional Patient appears healthy and well developed  No signs of acute distress present  Wearing TLSO brace  Loose appearing on presentation  Straps over shoulders b/l  Musculo: Spine   Spine:    Cervical Spine examination demonstrates (posterior cervical scar intact)  Thoracic Spine examination demonstrates Thoracic Spine: Tenderness: level mid thoracic spine, but not the left paraspinal and not the right paraspinal  (Kyphosis of upper T-spine)  Reversal of cervical lordosis  Skin On upper back Inferior and lateral to right of posterior cervical scar there is a wound covered with scab (10mm long x 5mm wide) with mild erythema  No drainage  No swelling  No warmth  Neurologic - Mental Status: Speech is articulated and fluent  Grossly nonfocal    Motor System - Upper Extremities: Normal to inspection and palpation  Strength: Deltoids 5/5 bilaterally  Biceps 5/5 bilaterally  Triceps 5/5 bilaterally  Extensor carpi radials is 5/5 bilaterally  Extensor digitorum 5/5 bilaterally  Intrinsic 5/5 bilaterally   5/5 bilaterally  Motor System - Lower Extremities: Hip flexion / abduction / adduction 5/5 bilaterally  Knee flexion / extension 5/5 bilaterally  Ankle DF/PF 5/5 bilaterally  Great toe DF 5/5 bilaterally  Reflexes: Biceps/BR/T/A Hyporeflexic b/l  Patellar +2 bilaterally  Toes muted b/l on Babinski   No ankle clonus bilaterally  Coordination: Involuntary movements: None   Sensory: Pinprick diminished in upper extremities distal from elbows bilaterally (which patient reports is chronic since prior neck injury a few years ago)  Pinprick intact of torso  Patient reports no sensation to pinprick in stocking distribution distal to knees (which patient reports has been present since prior neck injury a few years ago)  Vibratory sense intact b/l thumbs  Vibratory sense intact b/l knees but not distally in legs below knees  JPS intact b/l thumbs  JPS intact b/l knees but not at ankles or great toes  Gait and Station: Cathy with a normal gait  Independent  Attending Note  Collaborating Physician: I discussed the case with the Advanced Practitioner and reviewed the note, I supervised the Advanced Practitioner and I agree with the Advanced Practitioner note  Agree with Advanced Practitioner Note Except: Imaging personally reviewed  May show some slight subsidence of TSpine compression fractures  Brace not fitted correctly - patient re-instructed on how to wear brace  Will continue with conservative care, and plan for follow-up in ~4 weeks with new upright Xrays - ordered  Insurance barriers exist - will be discussed with patient by our office staff  Patient may elect to follow-up with provider within his insurance network  Future Appointments    Date/Time Provider Specialty Site   09/01/2017 03:00 PM Gabi Norton [facility] [address]   08/18/2017 09:00 AM Jeanne Cowden[address]     Signatures   Electronically signed by : CAROLYN Anthony;  Aug 16 2017  7:29AM EST                       (Author)    Electronically signed by : Kyle Euceda MD PhD; Aug 16 2017  8:24AM EST                       (Co-participant)

## 2018-01-16 NOTE — PROCEDURES
Procedures by Gilbert Rodriguez MD at 2017   8:50 AM      Author:  Gilbert Rodriguez MD Service:  Neurology Author Type:  Physician    Filed:  2017  9:53 AM Date of Service:  2017  8:50 AM Status:  Signed    :  Gilbert Rodriguez MD (Physician)        Procedure Orders:       1  EEG Video Monitoring 24 Hour [id number] ordered by Felix Cervantes at 17 0640                  Continuous Video EEG Monitoring       Patient Name:  Micki Godfrey  MRN: [id number]   :  1965 File #: Issa Alexander [id number]   Age: 47 y o  Encounter #: [id number]   Study Start: 2017 11:01  Study End: 2017 08:00           Report date: 2017          Study type: Continuous video EEG    ICD 10 diagnosis: Spells/Fit NOS R56 9 and Encephalopathy, unspecified G93 40    -------------------------------------------------  Patient History: This recording was observed in a 47 y o  male with history of alcoholism and seizures to evaluate for seizures as the cause  of altered mental status  Medications include:     calcium gluconate 2 g Intravenous Once   enoxaparin 40 mg Subcutaneous Daily   levETIRAcetam 750 mg Intravenous Q12H [address]   lidocaine 1 patch Transdermal Daily   LORazepam 2 mg Intravenous Q6H   magnesium sulfate 4 g Intravenous Once   pantoprazole 40 mg Intravenous Daily   potassium chloride 20 mEq Intravenous Once   potassium phosphate 30 mmol Intravenous Once   thiamine (VITAMIN B1) 50 mL IVPB 100 mg Intravenous Q24H       -------------------------------------------------  Description of Procedure:  32 channel digital recording with electrodes placed according to the International 10-20 system with additional T1/T2 electrodes,  EOG, EKG, and simultaneous video  A monitoring technologist supervised the continuous recording  The recording was technically satisfactory      -------------------------------------------------  Results:   Manual Review:   During wakefulness there was no posteriorly dominant rhythm that attenuated with eye opening  Instead, the background contained diffuse low amplitude beta activities and intermixed alpha activities  During sleep there were symmetric sleep spindles, vertex waves and low amplitude mixed frequency theta/delta activity present diffusely  Other findings:  Samples of the single channel ECG demonstrated a regular rhythm  Events:   Multiple push buttons tahmina the administration of Ativan      -------------------------------------------------  Interpretation: This prolonged, continuous video-EEG recording is mildly abnormal      A background of low amplitude beta and alpha activities without clear posteriorly posteriorly dominant rhythm during wakefulness suggests possible mild nonspecific diffuse cerebral dysfunction  Enhanced beta activities are a medication effect related  to benzodiazepine administration  No electrographic seizures or interictal epileptiform discharges are seen  Rachna Orozco MD   [facility] Maddy ROSE    Jul 30 2017  9:53AM [facility] Standard Time

## 2018-01-22 VITALS
HEART RATE: 108 BPM | DIASTOLIC BLOOD PRESSURE: 86 MMHG | BODY MASS INDEX: 22.6 KG/M2 | RESPIRATION RATE: 16 BRPM | HEIGHT: 66 IN | WEIGHT: 140.6 LBS | SYSTOLIC BLOOD PRESSURE: 114 MMHG | TEMPERATURE: 98.2 F

## 2018-01-22 VITALS
RESPIRATION RATE: 20 BRPM | OXYGEN SATURATION: 97 % | SYSTOLIC BLOOD PRESSURE: 130 MMHG | DIASTOLIC BLOOD PRESSURE: 84 MMHG | TEMPERATURE: 98 F | HEIGHT: 66 IN | WEIGHT: 141 LBS | BODY MASS INDEX: 22.66 KG/M2 | HEART RATE: 114 BPM

## 2018-02-05 DIAGNOSIS — J11.1 INFLUENZA: Primary | ICD-10-CM

## 2018-02-05 RX ORDER — OSELTAMIVIR PHOSPHATE 75 MG/1
75 CAPSULE ORAL 2 TIMES DAILY
Qty: 10 CAPSULE | Refills: 0 | Status: SHIPPED | OUTPATIENT
Start: 2018-02-05 | End: 2018-02-10

## 2018-02-12 ENCOUNTER — TELEPHONE (OUTPATIENT)
Dept: FAMILY MEDICINE CLINIC | Facility: CLINIC | Age: 53
End: 2018-02-12

## 2018-02-12 DIAGNOSIS — K86.9 PANCREATIC LESION: Primary | ICD-10-CM

## 2018-02-13 DIAGNOSIS — M50.10 CERVICAL DISC SYNDROME: Primary | ICD-10-CM

## 2018-02-13 DIAGNOSIS — I10 ESSENTIAL HYPERTENSION, BENIGN: ICD-10-CM

## 2018-02-13 RX ORDER — ALBUTEROL SULFATE 2.5 MG/3ML
SOLUTION RESPIRATORY (INHALATION)
COMMUNITY
Start: 2017-11-13 | End: 2018-07-17 | Stop reason: SDUPTHER

## 2018-02-13 RX ORDER — FUROSEMIDE 20 MG/1
1 TABLET ORAL DAILY
COMMUNITY
Start: 2017-09-21 | End: 2018-06-07 | Stop reason: HOSPADM

## 2018-02-13 RX ORDER — LANOLIN ALCOHOL/MO/W.PET/CERES
1 CREAM (GRAM) TOPICAL DAILY
Status: ON HOLD | COMMUNITY
End: 2018-05-22

## 2018-02-13 RX ORDER — LEVETIRACETAM 1000 MG/1
1000 TABLET ORAL EVERY 12 HOURS SCHEDULED
Qty: 60 TABLET | Refills: 0 | Status: SHIPPED | OUTPATIENT
Start: 2018-02-13 | End: 2018-04-02 | Stop reason: SDUPTHER

## 2018-02-13 RX ORDER — UBIDECARENONE 75 MG
1 CAPSULE ORAL DAILY
COMMUNITY
End: 2018-07-17 | Stop reason: SDUPTHER

## 2018-02-13 RX ORDER — CALCIUM CARBONATE/VITAMIN D3 500-10/5ML
LIQUID (ML) ORAL
Status: ON HOLD | COMMUNITY
End: 2018-05-22

## 2018-02-13 RX ORDER — GABAPENTIN 300 MG/1
CAPSULE ORAL
COMMUNITY
Start: 2017-11-20 | End: 2018-03-23

## 2018-02-13 RX ORDER — LISINOPRIL 10 MG/1
10 TABLET ORAL DAILY
Qty: 30 TABLET | Refills: 3 | Status: SHIPPED | OUTPATIENT
Start: 2018-02-13 | End: 2018-03-09 | Stop reason: SDUPTHER

## 2018-02-13 RX ORDER — LIDOCAINE 50 MG/G
1 PATCH TOPICAL
COMMUNITY
End: 2018-10-10

## 2018-02-19 ENCOUNTER — TELEPHONE (OUTPATIENT)
Dept: FAMILY MEDICINE CLINIC | Facility: CLINIC | Age: 53
End: 2018-02-19

## 2018-03-05 DIAGNOSIS — M54.9 DORSALGIA: Primary | ICD-10-CM

## 2018-03-05 RX ORDER — GABAPENTIN 100 MG/1
300 CAPSULE ORAL 3 TIMES DAILY
Qty: 90 CAPSULE | Refills: 0 | Status: SHIPPED | OUTPATIENT
Start: 2018-03-05 | End: 2018-03-23 | Stop reason: SDUPTHER

## 2018-03-09 DIAGNOSIS — I10 ESSENTIAL HYPERTENSION, BENIGN: ICD-10-CM

## 2018-03-09 RX ORDER — LISINOPRIL 10 MG/1
10 TABLET ORAL DAILY
Qty: 30 TABLET | Refills: 3 | Status: SHIPPED | OUTPATIENT
Start: 2018-03-09 | End: 2018-07-17 | Stop reason: SDUPTHER

## 2018-03-23 ENCOUNTER — TRANSCRIBE ORDERS (OUTPATIENT)
Dept: ADMINISTRATIVE | Facility: HOSPITAL | Age: 53
End: 2018-03-23

## 2018-03-23 DIAGNOSIS — M54.9 DORSALGIA: ICD-10-CM

## 2018-03-23 DIAGNOSIS — R14.0 ABDOMINAL DISTENTION: ICD-10-CM

## 2018-03-23 DIAGNOSIS — R10.10 PAIN OF UPPER ABDOMEN: Primary | ICD-10-CM

## 2018-03-23 RX ORDER — GABAPENTIN 100 MG/1
300 CAPSULE ORAL 3 TIMES DAILY
Qty: 90 CAPSULE | Refills: 0 | Status: SHIPPED | OUTPATIENT
Start: 2018-03-23 | End: 2018-07-17 | Stop reason: SDUPTHER

## 2018-03-25 ENCOUNTER — HOSPITAL ENCOUNTER (OUTPATIENT)
Dept: RADIOLOGY | Facility: HOSPITAL | Age: 53
Discharge: HOME | End: 2018-03-25
Payer: COMMERCIAL

## 2018-03-25 ENCOUNTER — HOSPITAL ENCOUNTER (OUTPATIENT)
Dept: ULTRASOUND IMAGING | Facility: HOSPITAL | Age: 53
Discharge: HOME | End: 2018-03-25
Payer: COMMERCIAL

## 2018-03-25 DIAGNOSIS — R14.0 ABDOMINAL DISTENTION: ICD-10-CM

## 2018-03-25 DIAGNOSIS — R10.10 PAIN OF UPPER ABDOMEN: ICD-10-CM

## 2018-03-25 PROCEDURE — 74019 RADEX ABDOMEN 2 VIEWS: CPT

## 2018-03-25 PROCEDURE — 76700 US EXAM ABDOM COMPLETE: CPT

## 2018-03-26 DIAGNOSIS — E87.1 HYPONATREMIA: Primary | ICD-10-CM

## 2018-03-26 RX ORDER — SODIUM CHLORIDE 1000 MG
1 TABLET, SOLUBLE MISCELLANEOUS 3 TIMES DAILY
Qty: 90 TABLET | Refills: 0 | Status: SHIPPED | OUTPATIENT
Start: 2018-03-26 | End: 2018-06-07 | Stop reason: HOSPADM

## 2018-04-02 DIAGNOSIS — M50.10 CERVICAL DISC SYNDROME: ICD-10-CM

## 2018-04-02 RX ORDER — LEVETIRACETAM 1000 MG/1
1000 TABLET ORAL EVERY 12 HOURS SCHEDULED
Qty: 60 TABLET | Refills: 0 | Status: SHIPPED | OUTPATIENT
Start: 2018-04-02 | End: 2018-07-17 | Stop reason: SDUPTHER

## 2018-05-22 ENCOUNTER — HOSPITAL ENCOUNTER (INPATIENT)
Facility: HOSPITAL | Age: 53
LOS: 16 days | Discharge: NURSING, REHAB OR LONG-TERM CARE | DRG: 364 | End: 2018-06-07
Attending: EMERGENCY MEDICINE | Admitting: INTERNAL MEDICINE
Payer: COMMERCIAL

## 2018-05-22 ENCOUNTER — APPOINTMENT (INPATIENT)
Dept: CT IMAGING | Facility: HOSPITAL | Age: 53
DRG: 364 | End: 2018-05-22
Payer: COMMERCIAL

## 2018-05-22 ENCOUNTER — APPOINTMENT (EMERGENCY)
Dept: CT IMAGING | Facility: HOSPITAL | Age: 53
DRG: 364 | End: 2018-05-22
Payer: COMMERCIAL

## 2018-05-22 DIAGNOSIS — L02.415 ABSCESS OF RIGHT LEG: ICD-10-CM

## 2018-05-22 DIAGNOSIS — E87.1 HYPONATREMIA: ICD-10-CM

## 2018-05-22 DIAGNOSIS — E88.09 HYPOALBUMINEMIA: ICD-10-CM

## 2018-05-22 DIAGNOSIS — E44.0 MALNUTRITION OF MODERATE DEGREE (HCC): ICD-10-CM

## 2018-05-22 DIAGNOSIS — D64.9 ANEMIA: ICD-10-CM

## 2018-05-22 DIAGNOSIS — L03.115 CELLULITIS OF RIGHT FOOT: Primary | ICD-10-CM

## 2018-05-22 DIAGNOSIS — E83.42 HYPOMAGNESEMIA: ICD-10-CM

## 2018-05-22 DIAGNOSIS — M79.604 RIGHT LEG PAIN: ICD-10-CM

## 2018-05-22 DIAGNOSIS — R60.0 PEDAL EDEMA: ICD-10-CM

## 2018-05-22 DIAGNOSIS — Z91.51 HX OF SUICIDE ATTEMPT: ICD-10-CM

## 2018-05-22 DIAGNOSIS — R74.01 TRANSAMINITIS: ICD-10-CM

## 2018-05-22 DIAGNOSIS — F10.230 ALCOHOL DEPENDENCE WITH UNCOMPLICATED WITHDRAWAL (HCC): ICD-10-CM

## 2018-05-22 DIAGNOSIS — IMO0001 ALCOHOLISM /ALCOHOL ABUSE: ICD-10-CM

## 2018-05-22 DIAGNOSIS — Z72.0 TOBACCO USE: Chronic | ICD-10-CM

## 2018-05-22 DIAGNOSIS — E80.6 HYPERBILIRUBINEMIA: ICD-10-CM

## 2018-05-22 DIAGNOSIS — L03.115 CELLULITIS OF RIGHT LEG: ICD-10-CM

## 2018-05-22 DIAGNOSIS — L03.90 CELLULITIS: ICD-10-CM

## 2018-05-22 PROBLEM — R19.7 DIARRHEA: Status: ACTIVE | Noted: 2018-05-22

## 2018-05-22 PROBLEM — R56.9 SEIZURE-LIKE ACTIVITY (HCC): Status: ACTIVE | Noted: 2018-05-22

## 2018-05-22 LAB
ALBUMIN SERPL BCP-MCNC: 2.8 G/DL (ref 3.5–5)
ALP SERPL-CCNC: 313 U/L (ref 46–116)
ALT SERPL W P-5'-P-CCNC: 93 U/L (ref 12–78)
AMMONIA PLAS-SCNC: 25 UMOL/L (ref 11–35)
ANION GAP SERPL CALCULATED.3IONS-SCNC: 12 MMOL/L (ref 4–13)
APTT PPP: 39 SECONDS (ref 24–36)
AST SERPL W P-5'-P-CCNC: 245 U/L (ref 5–45)
BASOPHILS # BLD AUTO: 0.03 THOUSANDS/ΜL (ref 0–0.1)
BASOPHILS NFR BLD AUTO: 0 % (ref 0–1)
BILIRUB SERPL-MCNC: 3 MG/DL (ref 0.2–1)
BILIRUB UR QL STRIP: NEGATIVE
BUN SERPL-MCNC: 6 MG/DL (ref 5–25)
CALCIUM SERPL-MCNC: 7.8 MG/DL (ref 8.3–10.1)
CHLORIDE SERPL-SCNC: 85 MMOL/L (ref 100–108)
CLARITY UR: CLEAR
CO2 SERPL-SCNC: 24 MMOL/L (ref 21–32)
COLOR UR: YELLOW
CREAT SERPL-MCNC: 0.55 MG/DL (ref 0.6–1.3)
EOSINOPHIL # BLD AUTO: 0.02 THOUSAND/ΜL (ref 0–0.61)
EOSINOPHIL NFR BLD AUTO: 0 % (ref 0–6)
ERYTHROCYTE [DISTWIDTH] IN BLOOD BY AUTOMATED COUNT: 18.6 % (ref 11.6–15.1)
ETHANOL SERPL-MCNC: 122 MG/DL (ref 0–3)
GFR SERPL CREATININE-BSD FRML MDRD: 121 ML/MIN/1.73SQ M
GLUCOSE SERPL-MCNC: 88 MG/DL (ref 65–140)
GLUCOSE UR STRIP-MCNC: NEGATIVE MG/DL
HCT VFR BLD AUTO: 25.6 % (ref 36.5–49.3)
HGB BLD-MCNC: 8.3 G/DL (ref 12–17)
HGB UR QL STRIP.AUTO: NEGATIVE
INR PPP: 1.12 (ref 0.86–1.17)
KETONES UR STRIP-MCNC: ABNORMAL MG/DL
LACTATE SERPL-SCNC: 1.7 MMOL/L (ref 0.5–2)
LEUKOCYTE ESTERASE UR QL STRIP: NEGATIVE
LIPASE SERPL-CCNC: 401 U/L (ref 73–393)
LYMPHOCYTES # BLD AUTO: 1.56 THOUSANDS/ΜL (ref 0.6–4.47)
LYMPHOCYTES NFR BLD AUTO: 16 % (ref 14–44)
MAGNESIUM SERPL-MCNC: 1.2 MG/DL (ref 1.6–2.6)
MCH RBC QN AUTO: 28.6 PG (ref 26.8–34.3)
MCHC RBC AUTO-ENTMCNC: 32.4 G/DL (ref 31.4–37.4)
MCV RBC AUTO: 88 FL (ref 82–98)
MONOCYTES # BLD AUTO: 1.41 THOUSAND/ΜL (ref 0.17–1.22)
MONOCYTES NFR BLD AUTO: 14 % (ref 4–12)
NEUTROPHILS # BLD AUTO: 6.89 THOUSANDS/ΜL (ref 1.85–7.62)
NEUTS SEG NFR BLD AUTO: 70 % (ref 43–75)
NITRITE UR QL STRIP: NEGATIVE
PH UR STRIP.AUTO: 6.5 [PH] (ref 4.5–8)
PLATELET # BLD AUTO: 200 THOUSANDS/UL (ref 149–390)
PMV BLD AUTO: 10.1 FL (ref 8.9–12.7)
POTASSIUM SERPL-SCNC: 3.6 MMOL/L (ref 3.5–5.3)
PROT SERPL-MCNC: 7.4 G/DL (ref 6.4–8.2)
PROT UR STRIP-MCNC: NEGATIVE MG/DL
PROTHROMBIN TIME: 13.9 SECONDS (ref 11.8–14.2)
RBC # BLD AUTO: 2.9 MILLION/UL (ref 3.88–5.62)
SODIUM SERPL-SCNC: 121 MMOL/L (ref 136–145)
SP GR UR STRIP.AUTO: <=1.005 (ref 1–1.03)
TROPONIN I SERPL-MCNC: <0.02 NG/ML
TROPONIN I SERPL-MCNC: <0.02 NG/ML
UROBILINOGEN UR QL STRIP.AUTO: 1 E.U./DL
WBC # BLD AUTO: 9.91 THOUSAND/UL (ref 4.31–10.16)

## 2018-05-22 PROCEDURE — 99285 EMERGENCY DEPT VISIT HI MDM: CPT

## 2018-05-22 PROCEDURE — 93005 ELECTROCARDIOGRAM TRACING: CPT

## 2018-05-22 PROCEDURE — 87389 HIV-1 AG W/HIV-1&-2 AB AG IA: CPT | Performed by: INTERNAL MEDICINE

## 2018-05-22 PROCEDURE — 87040 BLOOD CULTURE FOR BACTERIA: CPT | Performed by: PHYSICIAN ASSISTANT

## 2018-05-22 PROCEDURE — 84146 ASSAY OF PROLACTIN: CPT | Performed by: INTERNAL MEDICINE

## 2018-05-22 PROCEDURE — 74177 CT ABD & PELVIS W/CONTRAST: CPT

## 2018-05-22 PROCEDURE — 82140 ASSAY OF AMMONIA: CPT | Performed by: PHYSICIAN ASSISTANT

## 2018-05-22 PROCEDURE — 87070 CULTURE OTHR SPECIMN AEROBIC: CPT | Performed by: PHYSICIAN ASSISTANT

## 2018-05-22 PROCEDURE — 85730 THROMBOPLASTIN TIME PARTIAL: CPT | Performed by: PHYSICIAN ASSISTANT

## 2018-05-22 PROCEDURE — 84145 PROCALCITONIN (PCT): CPT | Performed by: INTERNAL MEDICINE

## 2018-05-22 PROCEDURE — 36415 COLL VENOUS BLD VENIPUNCTURE: CPT | Performed by: PHYSICIAN ASSISTANT

## 2018-05-22 PROCEDURE — 86705 HEP B CORE ANTIBODY IGM: CPT | Performed by: INTERNAL MEDICINE

## 2018-05-22 PROCEDURE — 85025 COMPLETE CBC W/AUTO DIFF WBC: CPT | Performed by: PHYSICIAN ASSISTANT

## 2018-05-22 PROCEDURE — 86803 HEPATITIS C AB TEST: CPT | Performed by: INTERNAL MEDICINE

## 2018-05-22 PROCEDURE — 87340 HEPATITIS B SURFACE AG IA: CPT | Performed by: INTERNAL MEDICINE

## 2018-05-22 PROCEDURE — 70450 CT HEAD/BRAIN W/O DYE: CPT

## 2018-05-22 PROCEDURE — 83690 ASSAY OF LIPASE: CPT | Performed by: PHYSICIAN ASSISTANT

## 2018-05-22 PROCEDURE — 87205 SMEAR GRAM STAIN: CPT | Performed by: PHYSICIAN ASSISTANT

## 2018-05-22 PROCEDURE — 87081 CULTURE SCREEN ONLY: CPT | Performed by: INTERNAL MEDICINE

## 2018-05-22 PROCEDURE — 80053 COMPREHEN METABOLIC PANEL: CPT | Performed by: PHYSICIAN ASSISTANT

## 2018-05-22 PROCEDURE — 83605 ASSAY OF LACTIC ACID: CPT | Performed by: PHYSICIAN ASSISTANT

## 2018-05-22 PROCEDURE — 84484 ASSAY OF TROPONIN QUANT: CPT | Performed by: INTERNAL MEDICINE

## 2018-05-22 PROCEDURE — 84484 ASSAY OF TROPONIN QUANT: CPT | Performed by: PHYSICIAN ASSISTANT

## 2018-05-22 PROCEDURE — 99223 1ST HOSP IP/OBS HIGH 75: CPT | Performed by: INTERNAL MEDICINE

## 2018-05-22 PROCEDURE — 85610 PROTHROMBIN TIME: CPT | Performed by: PHYSICIAN ASSISTANT

## 2018-05-22 PROCEDURE — 87186 SC STD MICRODIL/AGAR DIL: CPT | Performed by: PHYSICIAN ASSISTANT

## 2018-05-22 PROCEDURE — 80320 DRUG SCREEN QUANTALCOHOLS: CPT | Performed by: INTERNAL MEDICINE

## 2018-05-22 PROCEDURE — 86704 HEP B CORE ANTIBODY TOTAL: CPT | Performed by: INTERNAL MEDICINE

## 2018-05-22 PROCEDURE — 96365 THER/PROPH/DIAG IV INF INIT: CPT

## 2018-05-22 PROCEDURE — 87147 CULTURE TYPE IMMUNOLOGIC: CPT | Performed by: PHYSICIAN ASSISTANT

## 2018-05-22 PROCEDURE — 83735 ASSAY OF MAGNESIUM: CPT | Performed by: PHYSICIAN ASSISTANT

## 2018-05-22 PROCEDURE — 81003 URINALYSIS AUTO W/O SCOPE: CPT | Performed by: PHYSICIAN ASSISTANT

## 2018-05-22 PROCEDURE — 96367 TX/PROPH/DG ADDL SEQ IV INF: CPT

## 2018-05-22 RX ORDER — FOLIC ACID 5 MG/ML
1 INJECTION, SOLUTION INTRAMUSCULAR; INTRAVENOUS; SUBCUTANEOUS ONCE
Status: COMPLETED | OUTPATIENT
Start: 2018-05-22 | End: 2018-05-22

## 2018-05-22 RX ORDER — UBIDECARENONE 75 MG
100 CAPSULE ORAL DAILY
Status: DISCONTINUED | OUTPATIENT
Start: 2018-05-23 | End: 2018-06-07 | Stop reason: HOSPADM

## 2018-05-22 RX ORDER — ONDANSETRON 2 MG/ML
4 INJECTION INTRAMUSCULAR; INTRAVENOUS EVERY 6 HOURS PRN
Status: DISCONTINUED | OUTPATIENT
Start: 2018-05-22 | End: 2018-06-07 | Stop reason: HOSPADM

## 2018-05-22 RX ORDER — FOLIC ACID 1 MG/1
1 TABLET ORAL DAILY
Status: DISCONTINUED | OUTPATIENT
Start: 2018-05-23 | End: 2018-06-07 | Stop reason: HOSPADM

## 2018-05-22 RX ORDER — SODIUM CHLORIDE 9 MG/ML
100 INJECTION, SOLUTION INTRAVENOUS CONTINUOUS
Status: DISCONTINUED | OUTPATIENT
Start: 2018-05-22 | End: 2018-05-27

## 2018-05-22 RX ORDER — MAGNESIUM SULFATE HEPTAHYDRATE 40 MG/ML
2 INJECTION, SOLUTION INTRAVENOUS ONCE
Status: COMPLETED | OUTPATIENT
Start: 2018-05-22 | End: 2018-05-22

## 2018-05-22 RX ORDER — THIAMINE MONONITRATE (VIT B1) 100 MG
100 TABLET ORAL DAILY
Status: DISCONTINUED | OUTPATIENT
Start: 2018-05-23 | End: 2018-06-07 | Stop reason: HOSPADM

## 2018-05-22 RX ORDER — POTASSIUM CHLORIDE 20 MEQ/1
40 TABLET, EXTENDED RELEASE ORAL ONCE
Status: COMPLETED | OUTPATIENT
Start: 2018-05-22 | End: 2018-05-22

## 2018-05-22 RX ORDER — LORAZEPAM 2 MG/ML
1 INJECTION INTRAMUSCULAR EVERY 4 HOURS PRN
Status: DISCONTINUED | OUTPATIENT
Start: 2018-05-22 | End: 2018-06-07 | Stop reason: HOSPADM

## 2018-05-22 RX ORDER — OXYCODONE HYDROCHLORIDE 5 MG/1
5 TABLET ORAL EVERY 4 HOURS PRN
Status: DISCONTINUED | OUTPATIENT
Start: 2018-05-22 | End: 2018-05-25

## 2018-05-22 RX ORDER — GABAPENTIN 300 MG/1
300 CAPSULE ORAL 3 TIMES DAILY
Status: DISCONTINUED | OUTPATIENT
Start: 2018-05-22 | End: 2018-06-07 | Stop reason: HOSPADM

## 2018-05-22 RX ORDER — LORAZEPAM 2 MG/ML
0.5 INJECTION INTRAMUSCULAR EVERY 4 HOURS
Status: DISCONTINUED | OUTPATIENT
Start: 2018-05-22 | End: 2018-05-23

## 2018-05-22 RX ORDER — MELATONIN
1000 DAILY
Status: DISCONTINUED | OUTPATIENT
Start: 2018-05-23 | End: 2018-06-07 | Stop reason: HOSPADM

## 2018-05-22 RX ORDER — FOLIC ACID 1 MG/1
1 TABLET ORAL DAILY
Status: DISCONTINUED | OUTPATIENT
Start: 2018-05-23 | End: 2018-05-22 | Stop reason: SDUPTHER

## 2018-05-22 RX ORDER — LISINOPRIL 10 MG/1
10 TABLET ORAL DAILY
Status: DISCONTINUED | OUTPATIENT
Start: 2018-05-23 | End: 2018-06-07 | Stop reason: HOSPADM

## 2018-05-22 RX ADMIN — MAGNESIUM SULFATE HEPTAHYDRATE 2 G: 40 INJECTION, SOLUTION INTRAVENOUS at 18:18

## 2018-05-22 RX ADMIN — IOHEXOL 100 ML: 350 INJECTION, SOLUTION INTRAVENOUS at 17:50

## 2018-05-22 RX ADMIN — ENOXAPARIN SODIUM 40 MG: 40 INJECTION, SOLUTION INTRAVENOUS; SUBCUTANEOUS at 22:20

## 2018-05-22 RX ADMIN — GABAPENTIN 300 MG: 300 CAPSULE ORAL at 22:20

## 2018-05-22 RX ADMIN — SODIUM CHLORIDE 100 ML/HR: 0.9 INJECTION, SOLUTION INTRAVENOUS at 19:48

## 2018-05-22 RX ADMIN — LORAZEPAM 0.5 MG: 2 INJECTION, SOLUTION INTRAMUSCULAR; INTRAVENOUS at 22:20

## 2018-05-22 RX ADMIN — MAGNESIUM SULFATE HEPTAHYDRATE 2 G: 40 INJECTION, SOLUTION INTRAVENOUS at 20:40

## 2018-05-22 RX ADMIN — POTASSIUM CHLORIDE 40 MEQ: 1500 TABLET, EXTENDED RELEASE ORAL at 22:20

## 2018-05-22 RX ADMIN — SODIUM CHLORIDE 1000 ML: 0.9 INJECTION, SOLUTION INTRAVENOUS at 22:31

## 2018-05-22 RX ADMIN — VANCOMYCIN HYDROCHLORIDE 1000 MG: 1 INJECTION, POWDER, LYOPHILIZED, FOR SOLUTION INTRAVENOUS at 17:08

## 2018-05-22 RX ADMIN — PIPERACILLIN SODIUM,TAZOBACTAM SODIUM 3.38 G: 3; .375 INJECTION, POWDER, FOR SOLUTION INTRAVENOUS at 16:26

## 2018-05-22 RX ADMIN — FOLIC ACID 1 MG: 5 INJECTION, SOLUTION INTRAMUSCULAR; INTRAVENOUS; SUBCUTANEOUS at 23:06

## 2018-05-22 NOTE — ED PROVIDER NOTES
History  Chief Complaint   Patient presents with    Leg Swelling     Bilateral leg swelling for three days  Family states he has also had diarrhea for the past three days  Patient's family states he is intoxicated  56-year-old male presents to emergency department today with complaints of bilateral lower extremity swelling right greater than left with associated redness and clear drainage from right foot and lower leg  The redness is creeping up from the right foot up the right leg to the level of the knee  It is painful  It is preventing him from being able to walk  Patient denies any injury, chronic wounds or ulcers to the legs  He is not diabetic  Patient also reports three day history of diarrhea that was explosive, semi-solid, resolved after taking Imodium  He had a solid stool this morning  Denies passing any bloody stools  Patient was scheduled to have colonoscopy three weeks ago but he did not go at the last minute  Patient is a known alcoholic, he reports drinking six 25 ounce cans of beer per day  His for strength is at four o'clock in the morning  He drank today  Denies prior history of cirrhosis or prior paracentesis or hepatitis  He does report history of gallstones, but he did not follow through with the test for those that were scheduled three weeks ago as well  Patient and family interested in assisted living the cannot care for himself, he is noncompliant with medications, and unable to walk which is a new development over the last few days which seems to be of brought on by the leg swelling and infection  Patient denies fevers, chills, cough, chest pain, shortness of breath  He denies abdominal pain  His abdomen is swollen/round he has had a incisional hernia since bowel surgery last year which appears unchanged per patient  He is urinating normally, he has no rash or swelling aside from the legs nothing in the upper extremities torso or face          History provided by:  Patient and medical records      Prior to Admission Medications   Prescriptions Last Dose Informant Patient Reported? Taking?    Magnesium Oxide 400 MG CAPS   Yes No   Sig: Take by mouth   acetaminophen (TYLENOL) 325 mg tablet   No No   Sig: Take 2 tablets every 6 hours as needed   albuterol (2 5 mg/3 mL) 0 083 % nebulizer solution   Yes No   albuterol (PROVENTIL HFA,VENTOLIN HFA) 90 mcg/act inhaler   No No   Sig: Inhale 2 puffs every 4 (four) hours as needed for wheezing or shortness of breath   cholecalciferol (VITAMIN D3) 1,000 units tablet   Yes No   Sig: Take 1 tablet by mouth daily   cyanocobalamin (VITAMIN B-12) 100 mcg tablet   Yes No   Sig: Take 1 tablet by mouth daily   folic acid (FOLVITE) 1 mg tablet   No No   Sig: Take 1 tablet by mouth daily   furosemide (LASIX) 20 mg tablet   Yes No   Sig: Take 1 tablet by mouth daily   gabapentin (NEURONTIN) 100 mg capsule   No No   Sig: Take 3 capsules (300 mg total) by mouth 3 (three) times a day   levETIRAcetam (KEPPRA) 1000 MG tablet   No No   Sig: Take 1 tablet (1,000 mg total) by mouth every 12 (twelve) hours   lidocaine (LIDODERM) 5 %   Yes No   Sig: Apply 1 patch topically   lisinopril (ZESTRIL) 10 mg tablet   No No   Sig: Take 1 tablet (10 mg total) by mouth daily   magnesium oxide (MAG-OX) 400 mg   No No   Sig: Take 1 tablet by mouth 2 (two) times a day   sodium chloride 1 g tablet   No No   Sig: Take 1 tablet (1 g total) by mouth 3 (three) times a day   thiamine 100 MG tablet   No No   Sig: Take 1 tablet by mouth daily   thiamine 100 MG tablet   Yes No   Sig: Take 1 tablet by mouth daily      Facility-Administered Medications: None       Past Medical History:   Diagnosis Date    Alcohol abuse     Bowel obstruction (HCC)     Bowel perforation (HCC)     Cardiac disease     Continuous chronic alcoholism ([facility] [address] ) 10/5/2017    COPD (chronic obstructive pulmonary disease) (HCC)     History of shoulder surgery     Right shoulder    History of transfusion     Hx of cervical spine surgery     Hypertension     Incisional hernia 10/8/2017    MI, old     Mitral regurgitation     Psychiatric disorder     Seizures ([address] )        Past Surgical History:   Procedure Laterality Date    BACK SURGERY      ESOPHAGOGASTRODUODENOSCOPY N/A 11/28/2016    Procedure: ESOPHAGOGASTRODUODENOSCOPY (EGD); Surgeon: Jaquan Colunga MD;  Location:  GI LAB; Service:     LAPAROTOMY N/A 10/25/2016    Procedure: LAPAROTOMY EXPLORATORY;  Surgeon: Ham Rooney MD;  Location: [facility];  Service:    [address] Right     SMALL INTESTINE SURGERY         History reviewed  No pertinent family history  I have reviewed and agree with the history as documented  Social History   Substance Use Topics    Smoking status: Current Every Day Smoker     Packs/day: 2 00    Smokeless tobacco: Never Used    Alcohol use 3 6 oz/week     6 Cans of beer per week      Comment: 6 25oz cans a day        Review of Systems   Constitutional: Positive for appetite change  Negative for activity change, chills, diaphoresis, fatigue, fever and unexpected weight change  HENT: Negative for congestion, ear pain, mouth sores, nosebleeds, rhinorrhea, sinus pressure, sore throat and tinnitus  Eyes: Negative for visual disturbance  Respiratory: Negative for cough, chest tightness, shortness of breath and wheezing  Cardiovascular: Positive for leg swelling  Negative for chest pain and palpitations  Gastrointestinal: Positive for abdominal distention and diarrhea  Negative for abdominal pain, anal bleeding, blood in stool, constipation, nausea and vomiting  Genitourinary: Negative for difficulty urinating, dysuria, flank pain, frequency, hematuria, scrotal swelling and urgency  Musculoskeletal: Negative for arthralgias, back pain and myalgias  Skin: Positive for wound (redness and weeping RLE)  Negative for rash     Neurological: Positive for numbness (lower legs/feet)  Negative for dizziness, tremors, syncope, weakness and headaches  Physical Exam  Physical Exam   Constitutional: He is oriented to person, place, and time  He appears well-developed and well-nourished  No distress  HENT:   Head: Normocephalic and atraumatic  Nose: Nose normal    Mouth/Throat: Oropharynx is clear and moist    Eyes: Conjunctivae are normal  Pupils are equal, round, and reactive to light  Scleral icterus is present  Cardiovascular: Normal rate, regular rhythm, normal heart sounds and intact distal pulses  No murmur heard  Pulmonary/Chest: Effort normal and breath sounds normal  No respiratory distress  He exhibits no tenderness  Abdominal: Soft  There is no tenderness  There is no guarding  A hernia (large ventral incisional hernia  soft reducible nontender) is present  Musculoskeletal: He exhibits edema and tenderness  Neurological: He is alert and oriented to person, place, and time  Skin: Skin is warm and dry  Capillary refill takes less than 2 seconds  He is not diaphoretic  There is erythema (RLE weeping clear drainage; erythema dorsal foot up to knee)  Psychiatric: He has a normal mood and affect  Nursing note and vitals reviewed                    Vital Signs  ED Triage Vitals [05/22/18 1512]   Temperature Pulse Respirations Blood Pressure SpO2   97 7 °F (36 5 °C) 99 16 (!) 85/67 99 %      Temp Source Heart Rate Source Patient Position - Orthostatic VS BP Location FiO2 (%)   Temporal Monitor Sitting Right arm --      Pain Score       No Pain           Vitals:    05/22/18 1512 05/22/18 1530 05/22/18 1730   BP: (!) 85/67 104/75 95/63   Pulse: 99 97 94   Patient Position - Orthostatic VS: Sitting         Visual Acuity      ED Medications  Medications   magnesium sulfate 2 g/50 mL IVPB (premix) 2 g (2 g Intravenous New Bag 5/22/18 1818)   sodium chloride 0 9 % infusion (not administered)   vancomycin (VANCOCIN) 1,000 mg in sodium chloride 0 9 % 250 mL IVPB (0 mg/kg × 59 9 kg Intravenous Stopped 5/22/18 1838)   piperacillin-tazobactam (ZOSYN) 3 375 g in sodium chloride 0 9 % 50 mL IVPB (0 g Intravenous Stopped 5/22/18 1708)   iohexol (OMNIPAQUE) 350 MG/ML injection (SINGLE-DOSE) 100 mL (100 mL Intravenous Given 5/22/18 1750)       Diagnostic Studies  Results Reviewed     Procedure Component Value Units Date/Time    Lactic acid, plasma [[id number]]  (Normal) Collected:  05/22/18 1631    Lab Status:  Final result Specimen:  Blood from Arm, Right Updated:  05/22/18 1655     LACTIC ACID 1 7 mmol/L     Narrative:         Result may be elevated if tourniquet was used during collection  UA w Reflex to Microscopic w Reflex to Culture [[id number]]  (Abnormal) Collected:  05/22/18 1631    Lab Status:  Final result Specimen:  Urine from Urine, Clean Catch Updated:  05/22/18 1637     Color, UA Yellow     Clarity, UA Clear     Specific Gravity, UA <=1 005     pH, UA 6 5     Leukocytes, UA Negative     Nitrite, UA Negative     Protein, UA Negative mg/dl      Glucose, UA Negative mg/dl      Ketones, UA Trace (A) mg/dl      Urobilinogen, UA 1 0 E U /dl      Bilirubin, UA Negative     Blood, UA Negative    Blood culture #1 [[id number]] Collected:  05/22/18 1625    Lab Status: In process Specimen:  Blood from Arm, Right Updated:  05/22/18 1634    Wound culture and Gram stain [[id number]] Collected:  05/22/18 1631    Lab Status:   In process Specimen:  Wound from Leg, Right Updated:  05/22/18 1634    Comprehensive metabolic panel [[id number]]  (Abnormal) Collected:  05/22/18 1545    Lab Status:  Final result Specimen:  Blood from Arm, Left Updated:  05/22/18 1612     Sodium 121 (L) mmol/L      Potassium 3 6 mmol/L      Chloride 85 (L) mmol/L      CO2 24 mmol/L      Anion Gap 12 mmol/L      BUN 6 mg/dL      Creatinine 0 55 (L) mg/dL      Glucose 88 mg/dL      Calcium 7 8 (L) mg/dL       (H) U/L      ALT 93 (H) U/L      Alkaline Phosphatase 313 (H) U/L      Total Protein 7 4 g/dL Albumin 2 8 (L) g/dL      Total Bilirubin 3 00 (H) mg/dL      eGFR 121 ml/min/1 73sq m     Narrative:         National Kidney Disease Education Program recommendations are as follows:  GFR calculation is accurate only with a steady state creatinine  Chronic Kidney disease less than 60 ml/min/1 73 sq  meters  Kidney failure less than 15 ml/min/1 73 sq  meters  Lipase [[id number]]  (Abnormal) Collected:  05/22/18 1545    Lab Status:  Final result Specimen:  Blood from Arm, Left Updated:  05/22/18 1612     Lipase 401 (H) u/L     Magnesium [[id number]]  (Abnormal) Collected:  05/22/18 1545    Lab Status:  Final result Specimen:  Blood from Arm, Left Updated:  05/22/18 1612     Magnesium 1 2 (L) mg/dL     Troponin I [[id number]]  (Normal) Collected:  05/22/18 1545    Lab Status:  Final result Specimen:  Blood from Arm, Left Updated:  05/22/18 1612     Troponin I <0 02 ng/mL     Narrative:         [employer] Chemistry analyzer 99% cutoff is > 0 04 ng/mL in network labs    o cTnI 99% cutoff is useful only when applied to patients in the clinical setting of myocardial ischemia  o cTnI 99% cutoff should be interpreted in the context of clinical history, ECG findings and possibly cardiac imaging to establish correct diagnosis  o cTnI 99% cutoff may be suggestive but clearly not indicative of a coronary event without the clinical setting of myocardial ischemia      Protime-INR [[id number]]  (Normal) Collected:  05/22/18 1545    Lab Status:  Final result Specimen:  Blood from Arm, Left Updated:  05/22/18 1607     Protime 13 9 seconds      INR 1 12    APTT [[id number]]  (Abnormal) Collected:  05/22/18 1545    Lab Status:  Final result Specimen:  Blood from Arm, Left Updated:  05/22/18 1607     PTT 39 (H) seconds     Ammonia [[id number]]  (Normal) Collected:  05/22/18 1545    Lab Status:  Final result Specimen:  Blood from Arm, Left Updated:  05/22/18 1605     Ammonia 25 umol/L     CBC and differential [[id number]]  (Abnormal) Collected: 05/22/18 1545    Lab Status:  Final result Specimen:  Blood from Arm, Left Updated:  05/22/18 1553     WBC 9 91 Thousand/uL      RBC 2 90 (L) Million/uL      Hemoglobin 8 3 (L) g/dL      Hematocrit 25 6 (L) %      MCV 88 fL      MCH 28 6 pg      MCHC 32 4 g/dL      RDW 18 6 (H) %      MPV 10 1 fL      Platelets 575 Thousands/uL      Neutrophils Relative 70 %      Lymphocytes Relative 16 %      Monocytes Relative 14 (H) %      Eosinophils Relative 0 %      Basophils Relative 0 %      Neutrophils Absolute 6 89 Thousands/µL      Lymphocytes Absolute 1 56 Thousands/µL      Monocytes Absolute 1 41 (H) Thousand/µL      Eosinophils Absolute 0 02 Thousand/µL      Basophils Absolute 0 03 Thousands/µL     Blood culture #2 [[id number]] Collected:  05/22/18 1545    Lab Status: In process Specimen:  Blood from Arm, Left Updated:  05/22/18 1550                 CT abdomen pelvis w contrast   Final Result by Bridgett Cortez MD (05/22 1811)      Stable calcification either within or immediately adjacent to the common bile duct as compared with study from 10/8/2017, without evidence of biliary obstruction  Fatty infiltration of liver  Small amount of pericholecystic fluid and gallstones noted  No gallbladder wall thickening seen              Workstation performed: [id number]                    Procedures  ECG 12 Lead Documentation  Date/Time: 5/22/2018 5:40 PM  Performed by: Louie Duarte  Authorized by: Macarena Soto     Indications / Diagnosis:  Edema/weakness  ECG reviewed by me, the ED Provider: yes    Patient location:  ED  Rate:     ECG rate:  87  Rhythm:     Rhythm: sinus rhythm    Ectopy:     Ectopy: none    QRS:     QRS axis:  Normal  Conduction:     Conduction: normal    ST segments:     ST segments:  Normal  T waves:     T waves: normal             Phone Contacts  ED Phone Contact    ED Course  ED Course as of May 22 1950   Tue May 22, 2018   1611 PTT: (!) 39   1611 Ammonia: 25   1611 Protime: 13 9   1611 INR: 1 12   1611 WBC: 9 91   1611 Prior baseline 10 2-10 3 within past year Hemoglobin: (!) 8 3   1611 Hematocrit: (!) 25 6   1611 Platelets: 995   0313 Troponin I: <0 02   1647 Magnesium: (!) 1 2   1647 Sodium: (!) 121   1647 Potassium: 3 6   1647 Chloride: (!) 85   1647 CO2: 24   1647 Anion Gap: 12   1647 BUN: 6   1647 Creatinine: (!) 0 55   1647 Glucose: 88   1647 Calcium: (!) 7 8   1647 AST: (!) 245   1647 ALT: (!) 93   1647 Alkaline Phosphatase: (!) 313   1647 Total Bilirubin: (!) 3 00   1647 eGFR: 121   1844 Paged dr Anshul Orozco surgery to review CT findings of cbd stone vs calcification  Repeat abdominal exam is unchanged   Pt with no pain no ascites and soft reducible incisional hernia    1845 Case d/w SLIM okay for med surg admit        MDM  Number of Diagnoses or Management Options  Alcoholism /alcohol abuse ([facility] [address] ): established and worsening  Cellulitis of right foot: new and requires workup  Cellulitis of right leg: new and requires workup  Hyperbilirubinemia: new and requires workup  Hypoalbuminemia: new and requires workup  Hypomagnesemia: new and requires workup  Hyponatremia: established and worsening  Pedal edema: new and requires workup  Transaminitis: established and worsening     Amount and/or Complexity of Data Reviewed  Clinical lab tests: reviewed and ordered  Tests in the radiology section of CPT®: ordered and reviewed  Obtain history from someone other than the patient: yes (Pt ex-wife)  Discuss the patient with other providers: yes  Independent visualization of images, tracings, or specimens: yes    Patient Progress  Patient progress: stable    CritCare Time    Disposition  Final diagnoses:   Cellulitis of right foot   Cellulitis of right leg   Pedal edema   Hyponatremia   Hypomagnesemia   Hyperbilirubinemia   Alcoholism /alcohol abuse ([facility] [address] )   Transaminitis   Hypoalbuminemia     Time reflects when diagnosis was documented in both MDM as applicable and the Disposition within this note     Time User Action Codes Description Comment    5/22/2018  6:46 PM Stephanie Lakhwinder Add [O34 220] Cellulitis of right foot     5/22/2018  6:46 PM Stephanie Lakhwinder Add [P13 868] Cellulitis of right leg     5/22/2018  6:46 PM Stephanie [place] Add [R60 0] Pedal edema     5/22/2018  6:46 PM Stephanie Lakhwinder Add [E87 1] Hyponatremia     5/22/2018  6:47 PM Stephanie [place] Add [E83 42] Hypomagnesemia     5/22/2018  6:47 PM Stephanie [place] Add [R79 89] Elevated LFTs     5/22/2018  6:47 PM Stephanie Lakhwinder Add [E80 6] Hyperbilirubinemia     5/22/2018  6:47 PM Stephanie Lakhwinder Add [F10 20] Alcoholism /alcohol abuse ([address] )     5/22/2018  6:47 PM Stephanie [place] Remove [R79 89] Elevated LFTs     5/22/2018  6:47 PM Stephanie [place] Add [R74 0] Transaminitis     5/22/2018  6:48 PM Stephanie Lakhwinder Add [E88 09] Hypoalbuminemia       ED Disposition     ED Disposition Condition Comment    Admit  Case was discussed with melisa and the patient's admission status was agreed to be Admission Status: inpatient status to the service of Dr Florin Cherry   Follow-up Information    None         Patient's Medications   Discharge Prescriptions    No medications on file     No discharge procedures on file      ED Provider  Electronically Signed by           Raymundo Aceves PA-C  05/22/18 [facility]

## 2018-05-23 ENCOUNTER — APPOINTMENT (INPATIENT)
Dept: ULTRASOUND IMAGING | Facility: HOSPITAL | Age: 53
DRG: 364 | End: 2018-05-23
Payer: COMMERCIAL

## 2018-05-23 LAB
ABO GROUP BLD BPU: NORMAL
ABO GROUP BLD: NORMAL
ALBUMIN SERPL BCP-MCNC: 2.2 G/DL (ref 3.5–5)
ALP SERPL-CCNC: 292 U/L (ref 46–116)
ALT SERPL W P-5'-P-CCNC: 70 U/L (ref 12–78)
AMPHETAMINES SERPL QL SCN: NEGATIVE
ANION GAP SERPL CALCULATED.3IONS-SCNC: 10 MMOL/L (ref 4–13)
AST SERPL W P-5'-P-CCNC: 168 U/L (ref 5–45)
ATRIAL RATE: 87 BPM
BARBITURATES UR QL: NEGATIVE
BASOPHILS # BLD AUTO: 0.03 THOUSANDS/ΜL (ref 0–0.1)
BASOPHILS NFR BLD AUTO: 1 % (ref 0–1)
BENZODIAZ UR QL: NEGATIVE
BILIRUB SERPL-MCNC: 2.2 MG/DL (ref 0.2–1)
BLD GP AB SCN SERPL QL: NEGATIVE
BPU ID: NORMAL
BUN SERPL-MCNC: 4 MG/DL (ref 5–25)
CALCIUM SERPL-MCNC: 7.3 MG/DL (ref 8.3–10.1)
CHLORIDE SERPL-SCNC: 96 MMOL/L (ref 100–108)
CK MB SERPL-MCNC: 1.3 NG/ML (ref 0–5)
CK MB SERPL-MCNC: <1 % (ref 0–2.5)
CK SERPL-CCNC: 348 U/L (ref 39–308)
CO2 SERPL-SCNC: 24 MMOL/L (ref 21–32)
COCAINE UR QL: NEGATIVE
CREAT SERPL-MCNC: 0.55 MG/DL (ref 0.6–1.3)
CROSSMATCH: NORMAL
EOSINOPHIL # BLD AUTO: 0.04 THOUSAND/ΜL (ref 0–0.61)
EOSINOPHIL NFR BLD AUTO: 1 % (ref 0–6)
ERYTHROCYTE [DISTWIDTH] IN BLOOD BY AUTOMATED COUNT: 19.1 % (ref 11.6–15.1)
FERRITIN SERPL-MCNC: 197 NG/ML (ref 8–388)
FOLATE SERPL-MCNC: 2.8 NG/ML (ref 3.1–17.5)
GFR SERPL CREATININE-BSD FRML MDRD: 121 ML/MIN/1.73SQ M
GLUCOSE SERPL-MCNC: 105 MG/DL (ref 65–140)
HBV CORE AB SER QL: NORMAL
HBV CORE IGM SER QL: NORMAL
HBV SURFACE AG SER QL: NORMAL
HCT VFR BLD AUTO: 22.4 % (ref 36.5–49.3)
HCV AB SER QL: NORMAL
HGB BLD-MCNC: 7.2 G/DL (ref 12–17)
HIV 1+2 AB+HIV1 P24 AG SERPL QL IA: NORMAL
IRON SATN MFR SERPL: 18 %
IRON SERPL-MCNC: 37 UG/DL (ref 65–175)
LIPASE SERPL-CCNC: 300 U/L (ref 73–393)
LYMPHOCYTES # BLD AUTO: 1.14 THOUSANDS/ΜL (ref 0.6–4.47)
LYMPHOCYTES NFR BLD AUTO: 18 % (ref 14–44)
MAGNESIUM SERPL-MCNC: 1.4 MG/DL (ref 1.6–2.6)
MCH RBC QN AUTO: 28.9 PG (ref 26.8–34.3)
MCHC RBC AUTO-ENTMCNC: 32.1 G/DL (ref 31.4–37.4)
MCV RBC AUTO: 90 FL (ref 82–98)
METHADONE UR QL: NEGATIVE
MONOCYTES # BLD AUTO: 0.95 THOUSAND/ΜL (ref 0.17–1.22)
MONOCYTES NFR BLD AUTO: 15 % (ref 4–12)
NEUTROPHILS # BLD AUTO: 4.32 THOUSANDS/ΜL (ref 1.85–7.62)
NEUTS SEG NFR BLD AUTO: 67 % (ref 43–75)
OPIATES UR QL SCN: NEGATIVE
P AXIS: 71 DEGREES
PCP UR QL: NEGATIVE
PHOSPHATE SERPL-MCNC: 1.5 MG/DL (ref 2.7–4.5)
PLATELET # BLD AUTO: 189 THOUSANDS/UL (ref 149–390)
PMV BLD AUTO: 9.6 FL (ref 8.9–12.7)
POTASSIUM SERPL-SCNC: 3.8 MMOL/L (ref 3.5–5.3)
PR INTERVAL: 160 MS
PROCALCITONIN SERPL-MCNC: 0.28 NG/ML
PROCALCITONIN SERPL-MCNC: 0.28 NG/ML
PROLACTIN SERPL-MCNC: 15 NG/ML (ref 2.5–17.4)
PROT SERPL-MCNC: 6 G/DL (ref 6.4–8.2)
QRS AXIS: -12 DEGREES
QRSD INTERVAL: 98 MS
QT INTERVAL: 406 MS
QTC INTERVAL: 488 MS
RBC # BLD AUTO: 2.49 MILLION/UL (ref 3.88–5.62)
RH BLD: POSITIVE
SODIUM SERPL-SCNC: 130 MMOL/L (ref 136–145)
SPECIMEN EXPIRATION DATE: NORMAL
T WAVE AXIS: 72 DEGREES
THC UR QL: NEGATIVE
TIBC SERPL-MCNC: 204 UG/DL (ref 250–450)
TROPONIN I SERPL-MCNC: <0.02 NG/ML
TSH SERPL DL<=0.05 MIU/L-ACNC: 5.02 UIU/ML (ref 0.36–3.74)
UNIT DISPENSE STATUS: NORMAL
UNIT PRODUCT CODE: NORMAL
UNIT RH: NORMAL
VENTRICULAR RATE: 87 BPM
VIT B12 SERPL-MCNC: 1216 PG/ML (ref 100–900)
WBC # BLD AUTO: 6.48 THOUSAND/UL (ref 4.31–10.16)

## 2018-05-23 PROCEDURE — P9021 RED BLOOD CELLS UNIT: HCPCS

## 2018-05-23 PROCEDURE — 82728 ASSAY OF FERRITIN: CPT | Performed by: INTERNAL MEDICINE

## 2018-05-23 PROCEDURE — 82553 CREATINE MB FRACTION: CPT | Performed by: INTERNAL MEDICINE

## 2018-05-23 PROCEDURE — 83540 ASSAY OF IRON: CPT | Performed by: INTERNAL MEDICINE

## 2018-05-23 PROCEDURE — 86920 COMPATIBILITY TEST SPIN: CPT

## 2018-05-23 PROCEDURE — 83735 ASSAY OF MAGNESIUM: CPT | Performed by: INTERNAL MEDICINE

## 2018-05-23 PROCEDURE — 86901 BLOOD TYPING SEROLOGIC RH(D): CPT | Performed by: FAMILY MEDICINE

## 2018-05-23 PROCEDURE — 82607 VITAMIN B-12: CPT | Performed by: INTERNAL MEDICINE

## 2018-05-23 PROCEDURE — 99232 SBSQ HOSP IP/OBS MODERATE 35: CPT | Performed by: FAMILY MEDICINE

## 2018-05-23 PROCEDURE — 84100 ASSAY OF PHOSPHORUS: CPT | Performed by: INTERNAL MEDICINE

## 2018-05-23 PROCEDURE — 84484 ASSAY OF TROPONIN QUANT: CPT | Performed by: INTERNAL MEDICINE

## 2018-05-23 PROCEDURE — 93970 EXTREMITY STUDY: CPT

## 2018-05-23 PROCEDURE — 83690 ASSAY OF LIPASE: CPT | Performed by: INTERNAL MEDICINE

## 2018-05-23 PROCEDURE — 82550 ASSAY OF CK (CPK): CPT | Performed by: INTERNAL MEDICINE

## 2018-05-23 PROCEDURE — 83550 IRON BINDING TEST: CPT | Performed by: INTERNAL MEDICINE

## 2018-05-23 PROCEDURE — 84443 ASSAY THYROID STIM HORMONE: CPT | Performed by: INTERNAL MEDICINE

## 2018-05-23 PROCEDURE — 99254 IP/OBS CNSLTJ NEW/EST MOD 60: CPT | Performed by: INTERNAL MEDICINE

## 2018-05-23 PROCEDURE — 85025 COMPLETE CBC W/AUTO DIFF WBC: CPT | Performed by: INTERNAL MEDICINE

## 2018-05-23 PROCEDURE — 86850 RBC ANTIBODY SCREEN: CPT | Performed by: FAMILY MEDICINE

## 2018-05-23 PROCEDURE — 82746 ASSAY OF FOLIC ACID SERUM: CPT | Performed by: INTERNAL MEDICINE

## 2018-05-23 PROCEDURE — 80307 DRUG TEST PRSMV CHEM ANLYZR: CPT | Performed by: INTERNAL MEDICINE

## 2018-05-23 PROCEDURE — 30233N1 TRANSFUSION OF NONAUTOLOGOUS RED BLOOD CELLS INTO PERIPHERAL VEIN, PERCUTANEOUS APPROACH: ICD-10-PCS | Performed by: FAMILY MEDICINE

## 2018-05-23 PROCEDURE — 80053 COMPREHEN METABOLIC PANEL: CPT | Performed by: INTERNAL MEDICINE

## 2018-05-23 PROCEDURE — 86900 BLOOD TYPING SEROLOGIC ABO: CPT | Performed by: FAMILY MEDICINE

## 2018-05-23 PROCEDURE — 84145 PROCALCITONIN (PCT): CPT | Performed by: INTERNAL MEDICINE

## 2018-05-23 PROCEDURE — 93010 ELECTROCARDIOGRAM REPORT: CPT | Performed by: INTERNAL MEDICINE

## 2018-05-23 RX ORDER — LORAZEPAM 2 MG/ML
0.5 INJECTION INTRAMUSCULAR ONCE
Status: COMPLETED | OUTPATIENT
Start: 2018-05-23 | End: 2018-05-23

## 2018-05-23 RX ORDER — LORAZEPAM 2 MG/ML
1 INJECTION INTRAMUSCULAR EVERY 4 HOURS
Status: DISCONTINUED | OUTPATIENT
Start: 2018-05-23 | End: 2018-05-23

## 2018-05-23 RX ORDER — LEVETIRACETAM 500 MG/1
1500 TABLET ORAL EVERY 12 HOURS SCHEDULED
Status: DISCONTINUED | OUTPATIENT
Start: 2018-05-23 | End: 2018-06-06

## 2018-05-23 RX ORDER — LORAZEPAM 2 MG/ML
1 INJECTION INTRAMUSCULAR EVERY 6 HOURS
Status: DISCONTINUED | OUTPATIENT
Start: 2018-05-23 | End: 2018-05-24

## 2018-05-23 RX ORDER — ALBUTEROL SULFATE 2.5 MG/3ML
2.5 SOLUTION RESPIRATORY (INHALATION) EVERY 6 HOURS PRN
Status: DISCONTINUED | OUTPATIENT
Start: 2018-05-23 | End: 2018-06-07 | Stop reason: HOSPADM

## 2018-05-23 RX ORDER — MAGNESIUM SULFATE HEPTAHYDRATE 40 MG/ML
2 INJECTION, SOLUTION INTRAVENOUS ONCE
Status: COMPLETED | OUTPATIENT
Start: 2018-05-23 | End: 2018-05-23

## 2018-05-23 RX ADMIN — GABAPENTIN 300 MG: 300 CAPSULE ORAL at 09:04

## 2018-05-23 RX ADMIN — Medication 100 MG: at 00:24

## 2018-05-23 RX ADMIN — SODIUM CHLORIDE 100 ML/HR: 0.9 INJECTION, SOLUTION INTRAVENOUS at 18:02

## 2018-05-23 RX ADMIN — LEVETIRACETAM 1500 MG: 500 TABLET ORAL at 12:30

## 2018-05-23 RX ADMIN — VITAM B12 100 MCG: 100 TAB at 09:04

## 2018-05-23 RX ADMIN — LORAZEPAM 0.5 MG: 2 INJECTION, SOLUTION INTRAMUSCULAR; INTRAVENOUS at 05:37

## 2018-05-23 RX ADMIN — DIBASIC SODIUM PHOSPHATE, MONOBASIC POTASSIUM PHOSPHATE AND MONOBASIC SODIUM PHOSPHATE 1 TABLET: 852; 155; 130 TABLET ORAL at 12:30

## 2018-05-23 RX ADMIN — HYDROMORPHONE HYDROCHLORIDE 0.5 MG: 1 INJECTION, SOLUTION INTRAMUSCULAR; INTRAVENOUS; SUBCUTANEOUS at 00:07

## 2018-05-23 RX ADMIN — ENOXAPARIN SODIUM 40 MG: 40 INJECTION, SOLUTION INTRAVENOUS; SUBCUTANEOUS at 21:17

## 2018-05-23 RX ADMIN — DIBASIC SODIUM PHOSPHATE, MONOBASIC POTASSIUM PHOSPHATE AND MONOBASIC SODIUM PHOSPHATE 1 TABLET: 852; 155; 130 TABLET ORAL at 09:04

## 2018-05-23 RX ADMIN — MULTIPLE VITAMINS W/ MINERALS TAB 1 TABLET: TAB at 09:04

## 2018-05-23 RX ADMIN — LORAZEPAM 1 MG: 2 INJECTION, SOLUTION INTRAMUSCULAR; INTRAVENOUS at 09:24

## 2018-05-23 RX ADMIN — LORAZEPAM 1 MG: 2 INJECTION, SOLUTION INTRAMUSCULAR; INTRAVENOUS at 23:47

## 2018-05-23 RX ADMIN — SODIUM CHLORIDE 1000 ML: 0.9 INJECTION, SOLUTION INTRAVENOUS at 03:43

## 2018-05-23 RX ADMIN — FOLIC ACID 1 MG: 1 TABLET ORAL at 09:04

## 2018-05-23 RX ADMIN — LORAZEPAM 0.5 MG: 2 INJECTION, SOLUTION INTRAMUSCULAR; INTRAVENOUS at 03:39

## 2018-05-23 RX ADMIN — DIBASIC SODIUM PHOSPHATE, MONOBASIC POTASSIUM PHOSPHATE AND MONOBASIC SODIUM PHOSPHATE 1 TABLET: 852; 155; 130 TABLET ORAL at 21:13

## 2018-05-23 RX ADMIN — DIBASIC SODIUM PHOSPHATE, MONOBASIC POTASSIUM PHOSPHATE AND MONOBASIC SODIUM PHOSPHATE 1 TABLET: 852; 155; 130 TABLET ORAL at 16:22

## 2018-05-23 RX ADMIN — CEFAZOLIN SODIUM 1000 MG: 1 SOLUTION INTRAVENOUS at 13:01

## 2018-05-23 RX ADMIN — MAGNESIUM SULFATE HEPTAHYDRATE 2 G: 40 INJECTION, SOLUTION INTRAVENOUS at 09:03

## 2018-05-23 RX ADMIN — LORAZEPAM 0.5 MG: 2 INJECTION, SOLUTION INTRAMUSCULAR; INTRAVENOUS at 01:36

## 2018-05-23 RX ADMIN — LEVETIRACETAM 1500 MG: 500 TABLET ORAL at 21:13

## 2018-05-23 RX ADMIN — SODIUM CHLORIDE 125 ML/HR: 0.9 INJECTION, SOLUTION INTRAVENOUS at 00:23

## 2018-05-23 RX ADMIN — CEFAZOLIN SODIUM 1000 MG: 1 SOLUTION INTRAVENOUS at 21:17

## 2018-05-23 RX ADMIN — CEFAZOLIN SODIUM 1000 MG: 1 SOLUTION INTRAVENOUS at 06:19

## 2018-05-23 RX ADMIN — Medication 1500 MG: at 01:30

## 2018-05-23 RX ADMIN — THIAMINE HCL TAB 100 MG 100 MG: 100 TAB at 09:04

## 2018-05-23 RX ADMIN — GABAPENTIN 300 MG: 300 CAPSULE ORAL at 21:13

## 2018-05-23 RX ADMIN — NICOTINE 1 PATCH: 7 PATCH, EXTENDED RELEASE TRANSDERMAL at 09:03

## 2018-05-23 RX ADMIN — GABAPENTIN 300 MG: 300 CAPSULE ORAL at 16:22

## 2018-05-23 RX ADMIN — LORAZEPAM 1 MG: 2 INJECTION, SOLUTION INTRAMUSCULAR; INTRAVENOUS at 18:04

## 2018-05-23 RX ADMIN — VITAMIN D, TAB 1000IU (100/BT) 1000 UNITS: 25 TAB at 09:04

## 2018-05-23 NOTE — ASSESSMENT & PLAN NOTE
-the patient's hemoglobin has decreased since his last blood work in October 2017  -check a iron panel, a vitamin B12 level, and a folate level  -check the patient's stool for occult blood x 3 specimens  -follow the CBC  -transfuse for a hemoglobin less than 7

## 2018-05-23 NOTE — ASSESSMENT & PLAN NOTE
-monitor closely for signs of delirium tremens  -normal saline IV fluids  -thiamine 100 mg IV x 1 dose now and then thiamine 100 mg p o  daily thereafter  -folic acid 1 mg IV x 1 dose now and then folic acid 1 mg p o  daily thereafter  -multivitamin daily  -the patient will be placed on standing Ativan 0 5 mg IV every 4 hrs to prevent delirium tremens  -I will also utilize Ativan 1 mg IV every 4 hrs p r n  for seizure activity or signs of delirium tremens  -I counseled patient on alcohol cessation  -I recommend alcohol rehabilitation for the patient

## 2018-05-23 NOTE — PROGRESS NOTES
Progress Note - Misty Butts 1965, 47 y o  male MRN: [id number]    Unit/Bed#: 741-52 Encounter: [id number]    Primary Care Provider: Sandy Duran PA-C   Date and time admitted to hospital: 5/22/2018  3:08 PM        * Cellulitis of right lower extremity   Assessment & Plan    Continue Ancef  Follow-up bilateral lower extremity venous Dopplers  Follow blood cultures          Anemia   Assessment & Plan    GI consult  Follow-up iron panel, B12, folate  Heme test all stool  Transfuse 1 unit PRBCs        Diarrhea   Assessment & Plan    -check stool cultures including a Clostridium difficile culture and a rotavirus stool antigen test        [facility]   Assessment & Plan    CT that reviewed  Prolactin is top-normal  Transitioned to p o  Keppra 1500 mg p o  b i d  Transaminitis   Assessment & Plan    Likely alcoholic hepatitis  Will discuss with GI        Hyponatremia   Assessment & Plan    Improving with volume expansion  Continue normal saline        Tobacco use   Assessment & Plan    -nicotine patch  -smoking cessation counseling        Alcohol dependence ([facility] )   Assessment & Plan    monitor cl continue oral thiamine and folic acid supplementation  Ativan 1 mg IV q 6 hours  Ativan 1 mg IV p r n  q 4 hours   Monitor closely for signs of delirium tremens          Progress Note - Misty Butts 47 y o  male MRN: [id number]    Unit/Bed#: 422-01 Encounter: [id number]          Subjective:   Seen and examined @ bedside  He's lethargic but offers no complaints other than distended abdomen    Objective:     Vitals:   Vitals:    05/23/18 0858   BP:    Pulse: (!) 132   Resp:    Temp:    SpO2:      Body mass index is 21 87 kg/m²      Intake/Output Summary (Last 24 hours) at 05/23/18 1039  Last data filed at 05/23/18 0940   Gross per 24 hour   Intake             2630 ml   Output              600 ml   Net             2030 ml       Physical Exam:   /78 (BP Location: Right arm)   Pulse (!) 132 Temp 97 9 °F (36 6 °C) (Temporal)   Resp 20   Ht 5' 5" (1 651 m)   Wt 59 6 kg (131 lb 6 3 oz)   SpO2 92%   BMI 21 87 kg/m²   General appearance: alert and oriented, in no acute distress  Lungs: scattered ronchi  Heart: s1/s2 tachycardic  Abdomen: ventral hernia noted   No rebound/guarding  Extremities: RLE Erythema with medial ankle wound with purulent drainage  Pulses: +1  Neurologic: Grossly normal     Invasive Devices     Peripheral Intravenous Line            Peripheral IV 05/22/18 Left Antecubital less than 1 day                  Results from last 7 days  Lab Units 05/23/18  0517 05/22/18  1545   WBC Thousand/uL 6 48 9 91   HEMOGLOBIN g/dL 7 2* 8 3*   HEMATOCRIT % 22 4* 25 6*   PLATELETS Thousands/uL 189 200         Results from last 7 days  Lab Units 05/23/18  0517 05/22/18  1545   SODIUM mmol/L 130* 121*   POTASSIUM mmol/L 3 8 3 6   CHLORIDE mmol/L 96* 85*   CO2 mmol/L 24 24   BUN mg/dL 4* 6   CREATININE mg/dL 0 55* 0 55*   CALCIUM mg/dL 7 3* 7 8*   TOTAL PROTEIN g/dL 6 0* 7 4   BILIRUBIN TOTAL mg/dL 2 20* 3 00*   ALK PHOS U/L 292* 313*   ALT U/L 70 93*   AST U/L 168* 245*   GLUCOSE RANDOM mg/dL 105 88       Medication Administration - last 24 hours from 05/22/2018 1039 to 05/23/2018 1039       Date/Time Order Dose Route Action Action by     05/22/2018 1838 vancomycin (VANCOCIN) 1,000 mg in sodium chloride 0 9 % 250 mL IVPB 0 mg/kg Intravenous Stopped Vikram Mclaughlin RN     05/22/2018 1708 vancomycin (VANCOCIN) 1,000 mg in sodium chloride 0 9 % 250 mL IVPB 1,000 mg Intravenous New Bag Estella Lawson RN     05/22/2018 1708 piperacillin-tazobactam (ZOSYN) 3 375 g in sodium chloride 0 9 % 50 mL IVPB 0 g Intravenous Stopped Estella Lawson RN     05/22/2018 1626 piperacillin-tazobactam (ZOSYN) 3 375 g in sodium chloride 0 9 % 50 mL IVPB 3 375 g Intravenous [address] Estella Lawson RN     05/22/2018 2001 magnesium sulfate 2 g/50 mL IVPB (premix) 2 g 0 g Intravenous Stopped Vikram Mclaughlin RN     05/22/2018 4394 magnesium sulfate 2 g/50 mL IVPB (premix) 2 g 2 g Intravenous [address] Nils Brodie, RN     05/22/2018 1750 iohexol (OMNIPAQUE) 350 MG/ML injection (SINGLE-DOSE) 100 mL 100 mL Intravenous Given Mar Conte Alexandruo     05/23/2018 0023 sodium chloride 0 9 % infusion 125 mL/hr Intravenous [address] Adelaidakrystal Landaverde, RN     05/22/2018 2034 sodium chloride 0 9 % infusion 125 mL/hr Intravenous Rate/Dose Change Elvis Snellen, MARIANNE     05/22/2018 1948 sodium chloride 0 9 % infusion 100 mL/hr Intravenous [address] Deborah Graciela, MARIANNE     05/23/2018 0024 thiamine (VITAMIN B1) 100 mg in sodium chloride 0 9 % 50 mL IVPB 100 mg Intravenous [address] Ofelia Wayne, MARIANNE     54/19/6124 1764 folic acid injection 1 mg 1 mg Injection Given Elvis Snellen, RN     05/23/2018 7978 thiamine (VITAMIN B1) tablet 100 mg 100 mg Oral Given Steve Hall RN     05/22/2018 2040 magnesium sulfate 2 g/50 mL IVPB (premix) 2 g 2 g Intravenous [address] [facility], MARIANNE     05/23/2018 0130 levETIRAcetam (KEPPRA) 1,500 mg in sodium chloride 0 9 % 100 mL IVPB 1,500 mg Intravenous [address] Ofelia Wayne RN     05/23/2018 0537 LORazepam (ATIVAN) 2 mg/mL injection 0 5 mg 0 5 mg Intravenous Given Ofelia Wayne RN     05/23/2018 0136 LORazepam (ATIVAN) 2 mg/mL injection 0 5 mg 0 5 mg Intravenous Given Ofelia Wayne RN     05/22/2018 2220 LORazepam (ATIVAN) 2 mg/mL injection 0 5 mg 0 5 mg Intravenous Given Elvis Snellen, RN     05/22/2018 2220 potassium chloride (K-DUR,KLOR-CON) CR tablet 40 mEq 40 mEq Oral Given Elvis Snellen, RN     05/23/2018 0353 LORazepam (ATIVAN) 2 mg/mL injection 1 mg 1 mg Intravenous Not Given Ofelia Wayne RN     05/23/2018 3813 ceFAZolin (ANCEF) IVPB (premix) 1,000 mg 1,000 mg Intravenous [address] Ofelia Wayne RN     05/23/2018 0007 HYDROmorphone (DILAUDID) injection 0 5 mg 0 5 mg Intravenous Given Ofelia Wayne RN     05/23/2018 6416 cholecalciferol (VITAMIN D3) tablet 1,000 Units 1,000 Units Oral Given Alycia Avalos Jung Fofana, RN     05/23/2018 0749 cyanocobalamin (VITAMIN B-12) tablet 100 mcg 100 mcg Oral Given Xiomara Beasley RN     76/75/7753 8937 folic acid (FOLVITE) tablet 1 mg 1 mg Oral Given Xiomara Beasley RN     05/23/2018 3988 gabapentin (NEURONTIN) capsule 300 mg 300 mg Oral Given Xiomara Beasley RN     05/22/2018 2220 gabapentin (NEURONTIN) capsule 300 mg 300 mg Oral Given Skye Ruiz RN     05/23/2018 0905 lisinopril (ZESTRIL) tablet 10 mg 10 mg Oral Not Given Xiomara Beasley RN     05/23/2018 5315 nicotine (NICODERM CQ) 7 mg/24hr TD 24 hr patch 1 patch 1 patch Transdermal Medication Applied Xiomara Beasley RN     05/22/2018 2220 nicotine (NICODERM CQ) 7 mg/24hr TD 24 hr patch 1 patch 1 patch Transdermal Not Given Skye Ruiz RN     05/22/2018 2220 enoxaparin (LOVENOX) subcutaneous injection 40 mg 40 mg Subcutaneous Given Skye Ruiz RN     05/22/2018 2231 sodium chloride 0 9 % bolus 1,000 mL 1,000 mL Intravenous [address] [facility], MARIANNE     05/23/2018 0339 LORazepam (ATIVAN) 2 mg/mL injection 0 5 mg 0 5 mg Intravenous Given Edmond Whitmore RN     05/23/2018 0343 sodium chloride 0 9 % bolus 1,000 mL 1,000 mL Intravenous [address] [facility]     05/23/2018 7854 multivitamin-minerals (CENTRUM) tablet 1 tablet 1 tablet Oral Given Xiomara Beasley RN     05/23/2018 6657 magnesium sulfate 2 g/50 mL IVPB (premix) 2 g 2 g Intravenous [address] [facility]     05/23/2018 7422 potassium-sodium phosphateS (K-PHOS,PHOSPHA 250) -250 mg tablet 1 tablet 1 tablet Oral Given Xiomara Beasley RN     05/23/2018 6001 LORazepam (ATIVAN) 2 mg/mL injection 1 mg 1 mg Intravenous Given Xiomara Beasley RN            Lab, Imaging and other studies: I have personally reviewed pertinent reports      VTE Pharmacologic Prophylaxis: Enoxaparin (Lovenox)  VTE Mechanical Prophylaxis: sequential compression device     José Gifford MD  5/23/2018,10:39 AM

## 2018-05-23 NOTE — PLAN OF CARE
Problem: Potential for Falls  Goal: Patient will remain free of falls  INTERVENTIONS:  - Assess patient frequently for physical needs  -  Identify cognitive and physical deficits and behaviors that affect risk of falls    -  [place] fall precautions as indicated by assessment   - Educate patient/family on patient safety including physical limitations  - Instruct patient to call for assistance with activity based on assessment  - Modify environment to reduce risk of injury  - Consider OT/PT consult to assist with strengthening/mobility   Outcome: Progressing      Problem: PAIN - ADULT  Goal: Verbalizes/displays adequate comfort level or baseline comfort level  Interventions:  - Encourage patient to monitor pain and request assistance  - Assess pain using appropriate pain scale  - Administer analgesics based on type and severity of pain and evaluate response  - Implement non-pharmacological measures as appropriate and evaluate response  - Consider cultural and social influences on pain and pain management  - Notify physician/advanced practitioner if interventions unsuccessful or patient reports new pain  Outcome: Progressing      Problem: INFECTION - ADULT  Goal: Absence or prevention of progression during hospitalization  INTERVENTIONS:  - Assess and monitor for signs and symptoms of infection  - Monitor lab/diagnostic results    - [place] appropriate cooling/warming therapies per order  - Administer medications as ordered  - Instruct and encourage patient and family to use good hand hygiene technique  - Identify and instruct in appropriate isolation precautions for identified infection/condition  Outcome: Progressing      Problem: SAFETY ADULT  Goal: Maintain or return to baseline ADL function  INTERVENTIONS:  -  Assess patient's ability to carry out ADLs; assess patient's baseline for ADL function and identify physical deficits which impact ability to perform ADLs (bathing, care of mouth/teeth, toileting, grooming, dressing, etc )  - Assess/evaluate cause of self-care deficits   - Assess range of motion  - Assess patient's mobility; develop plan if impaired  - Assess patient's need for assistive devices and provide as appropriate  - Encourage maximum independence but intervene and supervise when necessary  ¯ Involve family in performance of ADLs  ¯ Assess for home care needs following discharge   ¯ Request OT consult to assist with ADL evaluation and planning for discharge  ¯ Provide patient education as appropriate  Outcome: Progressing    Goal: Maintain or return mobility status to optimal level  INTERVENTIONS:  - Assess patient's baseline mobility status (ambulation, transfers, stairs, etc )    - Identify cognitive and physical deficits and behaviors that affect mobility  - Identify mobility aids required to assist with transfers and/or ambulation (gait belt, sit-to-stand, lift, walker, cane, etc )  - [place] fall precautions as indicated by assessment  - Record patient progress and toleration of activity level on Mobility SBAR; progress patient to next Phase/Stage  - Instruct patient to call for assistance with activity based on assessment  - Request Rehabilitation consult to assist with strengthening/weightbearing, etc   Outcome: Progressing      Problem: DISCHARGE PLANNING  Goal: Discharge to home or other facility with appropriate resources  INTERVENTIONS:  - Identify barriers to discharge w/patient and caregiver  - Arrange for needed discharge resources and transportation as appropriate  - Identify discharge learning needs (meds, wound care, etc )  - Arrange for interpretive services to assist at discharge as needed  - Refer to Case Management Department for coordinating discharge planning if the patient needs post-hospital services based on physician/advanced practitioner order or complex needs related to functional status, cognitive ability, or social support system  Outcome: Progressing      Problem: Knowledge Deficit  Goal: Patient/family/caregiver demonstrates understanding of disease process, treatment plan, medications, and discharge instructions  Complete learning assessment and assess knowledge base    Interventions:  - Provide teaching at level of understanding  - Provide teaching via preferred learning methods  Outcome: Progressing

## 2018-05-23 NOTE — H&P
H&P- Giorgio Gold 1965, 47 y o  male MRN: [id number]    Unit/Bed#: 422-01 Encounter: [id number]    Primary Care Provider: Doris Hoang PA-C   Date and time admitted to hospital: 5/22/2018  3:08 PM        * Cellulitis of right lower extremity   Assessment & Plan    -admit to med/surg level of care  -telemetry is not needed  -check blood cultures x 2 sets  -check a MRSA nasal screen  -IV cefazolin  -normal saline IV fluids  -check a venous duplex of bilateral lower extremities        Hypomagnesemia   Assessment & Plan    -replete with a total of 4 g of IV magnesium sulfate now  -follow the magnesium level closely          Hyponatremia   Assessment & Plan    -this is likely multifactorial secondary to a hypovolemic hyponatremia and secondary to alcohol abuse with possible beer potomania  -normal saline IV fluids for now  -follow the sodium level closely  -check a urine sodium level and urine osmolality  -check TSH level  -if the hyponatremia does not improve, he will need a nephrology consultation        Diarrhea   Assessment & Plan    -check stool cultures including a Clostridium difficile culture and a rotavirus stool antigen test        Seizure-like activity (HCC)   Assessment & Plan    -increase the patient's Keppra to 1500 mg every 12 hrs, which will be given in the IV form at this time  -check an EEG  -seizure precautions  -neuro checks every 4 hr  -check a CT scan of the head without contrast now        Transaminitis   Assessment & Plan    -likely secondary to alcohol abuse and hepatic steatosis  -check a chronic hepatitis panel  -follow the liver function tests        Hepatic steatosis   Assessment & Plan    -in the setting of alcohol abuse  -he will need outpatient surveillance imaging of his hepatic steatosis  -follow the liver function tests  -he should follow-up with Gastroenterology as an outpatient  -the best way to treat the hepatic steatosis is with alcohol abstinence, increasing the patient's amount of exercise, and improving the patient's diet        Anemia   Assessment & Plan    -the patient's hemoglobin has decreased since his last blood work in October 2017  -check a iron panel, a vitamin B12 level, and a folate level  -check the patient's stool for occult blood x 3 specimens  -follow the CBC  -transfuse for a hemoglobin less than 7        Tobacco use   Assessment & Plan    -nicotine patch  -smoking cessation counseling        Alcohol dependence ([facility] )   Assessment & Plan    -monitor closely for signs of delirium tremens  -normal saline IV fluids  -thiamine 100 mg IV x 1 dose now and then thiamine 100 mg p o  daily thereafter  -folic acid 1 mg IV x 1 dose now and then folic acid 1 mg p o  daily thereafter  -multivitamin daily  -the patient will be placed on standing Ativan 0 5 mg IV every 4 hrs to prevent delirium tremens  -I will also utilize Ativan 1 mg IV every 4 hrs p r n  for seizure activity or signs of delirium tremens  -I counseled patient on alcohol cessation  -I recommend alcohol rehabilitation for the patient              VTE Prophylaxis: Enoxaparin (Lovenox)  / sequential compression device   Code Status:  Level 1-Full Code      Anticipated Length of Stay:  Patient will be admitted on an Inpatient basis with an anticipated length of stay of greater than 2 midnights  Justification for Hospital Stay:  The patient requires IV antibiotics, IV fluids, a work-up for his seizure activity, and serial laboratory testing to monitor his electrolytes  Total Time for Visit, including Counseling / Coordination of Care: 30 minutes  Greater than 50% of this total time spent on direct patient counseling and coordination of care  Chief Complaint:  Right lower extremity pain and redness    History of Present Illness:    Ghazal Kim is a 47 y o  male who presents to the emergency department complaints of right lower extremity edema and erythema    The patient also has developed mild erythema of the left lower extremity  The patient continues to abuse alcohol and drink beer on daily basis  The patient states he is trying to cut down on his daily alcohol intake  Over the last week, the patient has experienced multiple seizures, which has caused him to fall to the ground or floor  He has been experiencing multiple episodes diarrhea over last few days  In addition, the patient developed erythema and edema of his right lower extremity from his right knee down to his right foot  He also noticed some mild erythema of his left lower extremity  Nothing seemed to improve his symptoms  His symptoms were constant in nature  Review of Systems:    Review of Systems:  Per HPI, all other systems have been reviewed and were negative  Past Medical and Surgical History:     Past Medical History:   Diagnosis Date    Alcohol abuse     Bowel obstruction (HCC)     [facility] )     Cardiac disease     Continuous chronic alcoholism ([facility] ) 10/5/2017    COPD (chronic obstructive pulmonary disease) (HCC)     History of shoulder surgery     Right shoulder    History of transfusion     Hx of cervical spine surgery     Hypertension     Incisional hernia 10/8/2017    MI, old     Mitral regurgitation     Psychiatric disorder     Seizures ([facility] )        Past Surgical History:   Procedure Laterality Date    BACK SURGERY      ESOPHAGOGASTRODUODENOSCOPY N/A 11/28/2016    Procedure: ESOPHAGOGASTRODUODENOSCOPY (EGD); Surgeon: Jarvis Flores MD;  Location: [facility]; Service:     LAPAROTOMY N/A 10/25/2016    Procedure: LAPAROTOMY EXPLORATORY;  Surgeon: Roro Prasad MD;  Location: [facility];  Service:    Tejasnicole Roth MOUTH SURGERY      SHOULDER SURGERY Right     SMALL INTESTINE SURGERY         Meds/Allergies:    Prior to Admission medications    Medication Sig Start Date End Date Taking?  Authorizing Provider   albuterol (PROVENTIL HFA,VENTOLIN HFA) 90 mcg/act inhaler Inhale 2 puffs every 4 (four) hours as needed for wheezing or shortness of breath 8/5/17  Yes Ronda Butterfield MD   cholecalciferol (VITAMIN D3) 1,000 units tablet Take 1 tablet by mouth daily   Yes Historical Provider, MD   cyanocobalamin (VITAMIN B-12) 100 mcg tablet Take 1 tablet by mouth daily   Yes Historical Provider, MD   folic acid (FOLVITE) 1 mg tablet Take 1 tablet by mouth daily 8/5/17  Yes Ronda Butterfield MD   furosemide (LASIX) 20 mg tablet Take 1 tablet by mouth daily 9/21/17  Yes Historical Provider, MD   gabapentin (NEURONTIN) 100 mg capsule Take 3 capsules (300 mg total) by mouth 3 (three) times a day 3/23/18  Yes Cornelius Bocanegra PA-C   levETIRAcetam (KEPPRA) 1000 MG tablet Take 1 tablet (1,000 mg total) by mouth every 12 (twelve) hours 4/2/18  Yes Darwin Eller PA-C   lisinopril (ZESTRIL) 10 mg tablet Take 1 tablet (10 mg total) by mouth daily 3/9/18  Yes Cornelius Bocanegra PA-C   magnesium oxide (MAG-OX) 400 mg Take 1 tablet by mouth 2 (two) times a day 10/12/17  Yes Rod García DO   sodium chloride 1 g tablet Take 1 tablet (1 g total) by mouth 3 (three) times a day 3/26/18  Yes Cornelius Bocanegra PA-C   acetaminophen (TYLENOL) 325 mg tablet Take 2 tablets every 6 hours as needed 8/5/17   Ronda Butterfield MD   albuterol (2 5 mg/3 mL) 0 083 % nebulizer solution  11/13/17   Historical Provider, MD   lidocaine (LIDODERM) 5 % Apply 1 patch topically    Historical Provider, MD   thiamine 100 MG tablet Take 1 tablet by mouth daily 8/5/17   Ronda Butterfield MD   Magnesium Oxide 400 MG CAPS Take by mouth  5/22/18  Historical Provider, MD   thiamine 100 MG tablet Take 1 tablet by mouth daily  5/22/18  Historical Provider, MD     I have reviewed home medications with patient personally      Allergies: No Known Allergies    Social History:     Marital Status: Single     Substance Use History:   History   Alcohol Use    3 6 oz/week    6 Cans of beer per week     Comment: 6 25oz cans a day     History   Smoking Status    Current Every Day Smoker    Packs/day: 2 00    Years: 15 00   Smokeless Tobacco    Never Used     History   Drug Use No       Family History:    non-contributory    Physical Exam:     Vitals:   Blood Pressure: 94/58 (05/22/18 2010)  Pulse: 89 (05/22/18 2010)  Temperature: 98 °F (36 7 °C) (05/22/18 2010)  Temp Source: Temporal (05/22/18 2010)  Respirations: 18 (05/22/18 2010)  Height: 5' 5" (165 1 cm) (05/22/18 2010)  Weight - Scale: 60 kg (132 lb 4 4 oz) (05/22/18 2010)  SpO2: 98 % (05/22/18 2010)    Physical Exam    General:  NAD, awake, alert, follows commands  HEENT:  NC/AT, mucous membranes dry  Neck:  Supple, No JVP elevation  CV:  + S1, + S2, RRR  Pulm:  Lung fields are CTA bilaterally  Abd:  Soft, Non-tender, Non-distended  Ext:  No clubbing/cyanosis, + erythema of the right lower extremity from the right knee down to the right foot, scabbed lesion on the right knee, + bilateral lower extremity pretibial edema  Skin:  + erythema of the right lower extremity from the right knee down to the right foot with a scabbed lesion on the right knee      Additional Data:     Lab Results: I have personally reviewed pertinent reports  Results from last 7 days  Lab Units 05/22/18  1545   WBC Thousand/uL 9 91   HEMOGLOBIN g/dL 8 3*   HEMATOCRIT % 25 6*   PLATELETS Thousands/uL 200   NEUTROS PCT % 70   LYMPHS PCT % 16   MONOS PCT % 14*   EOS PCT % 0       Results from last 7 days  Lab Units 05/22/18  1545   SODIUM mmol/L 121*   POTASSIUM mmol/L 3 6   CHLORIDE mmol/L 85*   CO2 mmol/L 24   BUN mg/dL 6   CREATININE mg/dL 0 55*   CALCIUM mg/dL 7 8*   TOTAL PROTEIN g/dL 7 4   BILIRUBIN TOTAL mg/dL 3 00*   ALK PHOS U/L 313*   ALT U/L 93*   AST U/L 245*   GLUCOSE RANDOM mg/dL 88       Results from last 7 days  Lab Units 05/22/18  1545   INR  1 12               Imaging: I have personally reviewed pertinent reports        CT abdomen pelvis w contrast   Final Result by Abdirahman Beck MD (05/22 1811)      Stable calcification either within or immediately adjacent to the common bile duct as compared with study from 10/8/2017, without evidence of biliary obstruction  Fatty infiltration of liver  Small amount of pericholecystic fluid and gallstones noted  No gallbladder wall thickening seen  Workstation performed: [id number]         CT head wo contrast    (Results Pending)   VAS lower limb venous duplex study, complete bilateral    (Results Pending)       EKG, Pathology, and Other Studies Reviewed on Admission:   · EKG (05/22/2018): Sinus rhythm with a heart rate of 87 bpm     Allscripts / Epic Records Reviewed: Yes     ** Please Note: This note has been constructed using a voice recognition system   **

## 2018-05-23 NOTE — ASSESSMENT & PLAN NOTE
-in the setting of alcohol abuse  -he will need outpatient surveillance imaging of his hepatic steatosis  -follow the liver function tests  -he should follow-up with Gastroenterology as an outpatient  -the best way to treat the hepatic steatosis is with alcohol abstinence, increasing the patient's amount of exercise, and improving the patient's diet

## 2018-05-23 NOTE — CONSULTS
Consultation - [facility]  Abner Hazel 47 y o  male MRN: [id number]  Unit/Bed#: 827-77 Encounter: [id number]        ASSESSMENT/PLAN:  1  Alcoholic hepatitis   -  His LFTs are elevated with total bilirubin 2 20, alk phos 292,  and ALT 70  These are down trending from yesterday  -  His INR  and platelets are within normal limits  -  He had a CT abdomen/pelvis which showed fatty infiltration of the liver and a small amount of pericholecystic fluid and gallstones noted  Also seen was a stable calcification either within or immediately adjacent to the common bile duct, without evidence of biliary obstruction  No evidence of cirrhosis noted  -Discriminant function is 1, steroids are not indicated  -Alcohol cessation is imperative    -Continue to monitor LFTs  2  Abnormal CT finding   -He had a CT abdomen/pelvis which showed fatty infiltration of the liver and a small amount of pericholecystic fluid and gallstones noted  Also seen was a stable calcification either within or immediately adjacent to the common bile duct, without evidence of biliary obstruction    -Consider EUS as an outpatient for further evaluation  3  Diarrhea   -  He reported a 3 day history of diarrhea which resolved with Imodium, per the chart he had a solid bowel movement yesterday  -  C diff stool test and rotavirus stool antigen test are pending    -   Possibly secondary to alcohol consumption/withdrawal     4  Anemia, normochromic/normocytic   - he has acute on chronic anemia, his baseline from  Late 2017 appears to be 10-11, on admission his hemoglobin was 8 3 and today it is 7 2  Some dilutional effect    -  No signs of overt GI bleeding   -  His folate was low, folic acid ordered  - Iron panel showed low TIBC and serum iron consistent with anemia of chronic disease, likely due to his alcohol abuse     - he reports that he had a colonoscopy 2 years ago he believes this was negative however he does not recall what institution this was performed at or the performing physician so the report is not available  -  Recommend EGD and colonoscopy as an outpatient to complete workup for anemia  - continue monitor hemoglobin and transfuse necessary   - if he does develop signs of overt GI bleeding, consider inpatient scopes as appropriate  -   Plan was discussed with Dr Nash Fontaine  Inpatient consult to gastroenterology  Consult performed by: Tatiana Miramontes  Consult ordered by: Nakia Lovelace          Reason for Consult / Principal Problem: Cellulitis of right lower extremity    HPI: Sujata Urbina is a 47y o  year old male with a PMH of  alcohol abuse presents to the hospital for bilateral lower extremity swelling, drainage and pain  His blood alcohol level was elevated  GI evaluation is for elevated LFTs and anemia  Patient appears lethargic and answers questions only minimally, he appears to fall asleep in between questions  Some history is obtained from the chart as he provides limited history today  He had a 3 day history of diarrhea that resolved after taking Imodium, per the chart he had a solid stool yesterday morning  He denies any hematochezia or melena  He denies any fevers, chills, abdominal pain, difficulty swallowing, nausea, vomiting or jaundice  He states that he had a bowel resection many years ago due to perforation, he does not recall the reason for the perforation  He believes he had a colonoscopy about 2 years ago, he does not recall any abnormal findings  He only remembers this was performed in [place], he does not remember what institution or who performed it      Review of Systems: as per HPI  Review of Systems   Unable to perform ROS: Acuity of condition       Historical Information   Past Medical History:   Diagnosis Date    Alcohol abuse     [facility] [address] [facility]     [facility] )     Cardiac disease     Continuous chronic alcoholism ([facility] ) 10/5/2017  COPD (chronic obstructive pulmonary disease) (HCC)     History of shoulder surgery     Right shoulder    History of transfusion     Hx of cervical spine surgery     Hypertension     Incisional hernia 10/8/2017    MI, old     Mitral regurgitation     Psychiatric disorder     Seizures ([address] )      Past Surgical History:   Procedure Laterality Date    BACK SURGERY      ESOPHAGOGASTRODUODENOSCOPY N/A 11/28/2016    Procedure: ESOPHAGOGASTRODUODENOSCOPY (EGD); Surgeon: Maia Viera MD;  Location:  GI LAB; Service:     LAPAROTOMY N/A 10/25/2016    Procedure: LAPAROTOMY EXPLORATORY;  Surgeon: Gunnar Cueva MD;  Location: [facility];  Service:    [address] Right     SMALL INTESTINE SURGERY       Social History   History   Alcohol Use    3 6 oz/week    6 Cans of beer per week     Comment: 6 25oz cans a day     History   Drug Use No     History   Smoking Status    Current Every Day Smoker    Packs/day: 2 00    Years: 15 00   Smokeless Tobacco    Never Used     History reviewed  No pertinent family history      Meds/Allergies     Prescriptions Prior to Admission   Medication    albuterol (PROVENTIL HFA,VENTOLIN HFA) 90 mcg/act inhaler    cholecalciferol (VITAMIN D3) 1,000 units tablet    cyanocobalamin (VITAMIN B-12) 100 mcg tablet    folic acid (FOLVITE) 1 mg tablet    furosemide (LASIX) 20 mg tablet    gabapentin (NEURONTIN) 100 mg capsule    levETIRAcetam (KEPPRA) 1000 MG tablet    lisinopril (ZESTRIL) 10 mg tablet    magnesium oxide (MAG-OX) 400 mg    sodium chloride 1 g tablet    acetaminophen (TYLENOL) 325 mg tablet    albuterol (2 5 mg/3 mL) 0 083 % nebulizer solution    lidocaine (LIDODERM) 5 %    thiamine 100 MG tablet     Current Facility-Administered Medications   Medication Dose Route Frequency    albuterol inhalation solution 2 5 mg  2 5 mg Nebulization Q6H PRN    ceFAZolin (ANCEF) IVPB (premix) 1,000 mg  1,000 mg Intravenous Q8H    cholecalciferol (VITAMIN D3) tablet 1,000 Units  1,000 Units Oral Daily    cyanocobalamin (VITAMIN B-12) tablet 100 mcg  100 mcg Oral Daily    enoxaparin (LOVENOX) subcutaneous injection 40 mg  40 mg Subcutaneous [id number]    folic acid (FOLVITE) tablet 1 mg  1 mg Oral Daily    gabapentin (NEURONTIN) capsule 300 mg  300 mg Oral TID    HYDROmorphone (DILAUDID) injection 0 5 mg  0 5 mg Intravenous Q3H PRN    levETIRAcetam (KEPPRA) tablet 1,500 mg  1,500 mg Oral Q12H [address]    lisinopril (ZESTRIL) tablet 10 mg  10 mg Oral Daily    LORazepam (ATIVAN) 2 mg/mL injection 1 mg  1 mg Intravenous Q4H PRN    LORazepam (ATIVAN) 2 mg/mL injection 1 mg  1 mg Intravenous Q6H    multivitamin-minerals (CENTRUM) tablet 1 tablet  1 tablet Oral Daily    nicotine (NICODERM CQ) 7 mg/24hr TD 24 hr patch 1 patch  1 patch Transdermal Daily    ondansetron (ZOFRAN) injection 4 mg  4 mg Intravenous Q6H PRN    oxyCODONE (ROXICODONE) IR tablet 5 mg  5 mg Oral Q4H PRN    potassium-sodium phosphateS (K-PHOS,PHOSPHA 250) -250 mg tablet 1 tablet  1 tablet Oral 4x Daily (with meals and at bedtime)    sodium chloride 0 9 % infusion  100 mL/hr Intravenous Continuous    thiamine (VITAMIN B1) tablet 100 mg  100 mg Oral Daily       No Known Allergies    Objective     Blood pressure 104/78, pulse (!) 132, temperature 97 9 °F (36 6 °C), temperature source Temporal, resp  rate 20, height 5' 5" (1 651 m), weight 59 6 kg (131 lb 6 3 oz), SpO2 92 %  Intake/Output Summary (Last 24 hours) at 05/23/18 1232  Last data filed at 05/23/18 1228   Gross per 24 hour   Intake          4140 42 ml   Output              600 ml   Net          3540 42 ml       PHYSICAL EXAM     Physical Exam   Constitutional: He appears lethargic  No distress  HENT:   Head: Normocephalic and atraumatic  Eyes: Right eye exhibits no discharge  Left eye exhibits no discharge  No scleral icterus  Neck: Neck supple  No tracheal deviation present     Cardiovascular: Normal rate, regular rhythm, normal heart sounds and intact distal pulses  Exam reveals no gallop and no friction rub  No murmur heard  Pulmonary/Chest: Effort normal and breath sounds normal  No respiratory distress  He has no wheezes  He has no rales  He exhibits no tenderness  Abdominal: Soft  Bowel sounds are normal  He exhibits distension (midline scar, ventral hernia)  He exhibits no mass  There is no tenderness  There is no rebound and no guarding  Neurological: He appears lethargic  Skin: Skin is warm and dry  Psychiatric: He has a normal mood and affect  Lab Results:   CBC: Lab Results   Component Value Date    WBC 6 48 05/23/2018    HGB 7 2 (L) 05/23/2018    HCT 22 4 (L) 05/23/2018    MCV 90 05/23/2018     05/23/2018    MCH 28 9 05/23/2018    MCHC 32 1 05/23/2018    RDW 19 1 (H) 05/23/2018    MPV 9 6 05/23/2018   ,   CMP: Lab Results   Component Value Date     (L) 05/23/2018    K 3 8 05/23/2018    CL 96 (L) 05/23/2018    CO2 24 05/23/2018    ANIONGAP 10 05/23/2018    BUN 4 (L) 05/23/2018    CREATININE 0 55 (L) 05/23/2018    GLUCOSE 105 05/23/2018    CALCIUM 7 3 (L) 05/23/2018     (H) 05/23/2018    ALT 70 05/23/2018    ALKPHOS 292 (H) 05/23/2018    PROT 6 0 (L) 05/23/2018    BILITOT 2 20 (H) 05/23/2018    EGFR 121 05/23/2018   ,   Lipase:   Lab Results   Component Value Date    LIPASE 300 05/23/2018   ,  PT/INR:   Lab Results   Component Value Date    INR 1 12 05/22/2018   ,   Troponin:   Lab Results   Component Value Date    TROPONINI <0 02 05/23/2018   ,   Magnesium: No results found for: MAG,   Phosphorous:   Lab Results   Component Value Date    PHOS 1 5 (L) 05/23/2018     Imaging Studies: I have personally reviewed pertinent reports  CT ABDOMEN AND PELVIS WITH IV CONTRAST     INDICATION:   new elevated LFTs  h/o cholelithiasis      COMPARISON: October 8, 2017     TECHNIQUE:  CT examination of the abdomen and pelvis was performed   Axial, sagittal, and coronal 2D reformatted images were created from the source data and submitted for interpretation      Radiation dose length product (DLP) for this visit:  253 32 mGy-cm   This examination, like all CT scans performed in the [facility], was performed utilizing techniques to minimize radiation dose exposure, including the use of iterative   reconstruction and automated exposure control      IV Contrast:  100 mL of iohexol (OMNIPAQUE)  Enteric Contrast:  Enteric contrast was not administered      FINDINGS:     ABDOMEN     LOWER CHEST:  Minimal hazy opacity in the right lower lobe is similar to the prior study and may represent some scarring or chronic atelectasis  No significant infiltrates are appreciated  There is some calcification which seems to be at the aortic   valve      LIVER/BILIARY TREE:  There is diffuse relatively severe fatty infiltration of the liver  No masses are seen  No biliary dilatation identified  Portal and hepatic veins are patent      GALLBLADDER:  There is a small amount of pericholecystic fluid  Gallstone noted in the gallbladder lumen  No gallbladder wall thickening identified  There is a calcification visible on image 32 series 2 which is stable when compared with the prior   study and seems to be a potential nonobstructing common bile duct stone  It is only a couple millimeters  As this is unchanged from the prior study, an alternate consideration would be that this represents a calcification in the pancreatic tissue   immediately adjacent to the common bile duct  The pancreatic duct is visible but is within normal limits      SPLEEN:  Some scattered splenic calcifications are noted  Spleen size is normal      PANCREAS:  No acute inflammation  No mass lesions     ADRENAL GLANDS:  Unremarkable      KIDNEYS/URETERS:  Unremarkable   No hydronephrosis      STOMACH AND BOWEL:  Unremarkable      APPENDIX:  No findings to suggest appendicitis      ABDOMINOPELVIC CAVITY:  No ascites or free intraperitoneal air  No lymphadenopathy      VESSELS:  Significant atherosclerotic calcification noted along the aorta and iliac arteries, without critical stenoses      PELVIS     REPRODUCTIVE ORGANS:  Unremarkable for patient's age      URINARY BLADDER:  Unremarkable      ABDOMINAL WALL/INGUINAL REGIONS:  There is some protrusion of the midline abdominal wall and apparent paucity of the subcutaneous tissues along the midline of the abdominal wall which is stable from the prior study and might be related to prior surgery   and/or hernia repair  No hernia sac seen      OSSEOUS STRUCTURES:  There are some old left lateral rib injuries  There is some degenerative change of the lumbar spine  No acute fractures are seen      IMPRESSION:     Stable calcification either within or immediately adjacent to the common bile duct as compared with study from 10/8/2017, without evidence of biliary obstruction      Fatty infiltration of liver      Small amount of pericholecystic fluid and gallstones noted  No gallbladder wall thickening seen  Patient was seen and examined by Dr Millicent Latham  All [facility] medical decisions were made by Dr Millicent Latham  Thank you for allowing us to participate in the care of this present patient  We will follow-up with you closely

## 2018-05-23 NOTE — ASSESSMENT & PLAN NOTE
-this is likely multifactorial secondary to a hypovolemic hyponatremia and secondary to alcohol abuse with possible beer potomania  -normal saline IV fluids for now  -follow the sodium level closely  -check a urine sodium level and urine osmolality  -check TSH level  -if the hyponatremia does not improve, he will need a nephrology consultation

## 2018-05-23 NOTE — CASE MANAGEMENT
Initial Clinical Review    Admission: Date/Time/Statement: 5/22/18 @ 1905     Orders Placed This Encounter   Procedures    Inpatient Admission     Standing Status:   Standing     Number of Occurrences:   1     Order Specific Question:   Admitting Physician     Answer:   Bre Lopez     Order Specific Question:   Level of Care     Answer:   Med Surg [16]     Order Specific Question:   Estimated length of stay     Answer:   More than 2 Midnights     Order Specific Question:   Certification     Answer:   I certify that inpatient services are medically necessary for this patient for a duration of greater than two midnights  See H&P and MD Progress Notes for additional information about the patient's course of treatment  ED: Date/Time/Mode of Arrival:   ED Arrival Information     Expected Arrival Acuity Means of Arrival Escorted By Service Admission Type    - 5/22/2018 14:59 Urgent Wheelchair Family Member Family Medicine Urgent    Arrival Complaint    FOOT SWELLING          Chief Complaint:   Chief Complaint   Patient presents with    Leg Swelling     Bilateral leg swelling for three days  Family states he has also had diarrhea for the past three days  Patient's family states he is intoxicated  History of Illness:  Erwin Renee is a 47 y o  male who presents to the emergency department complaints of right lower extremity edema and erythema  The patient also has developed mild erythema of the left lower extremity  The patient continues to abuse alcohol and drink beer on daily basis  The patient states he is trying to cut down on his daily alcohol intake  Over the last week, the patient has experienced multiple seizures, which has caused him to fall to the ground or floor  He has been experiencing multiple episodes diarrhea over last few days  In addition, the patient developed erythema and edema of his right lower extremity from his right knee down to his right foot    He also noticed some mild erythema of his left lower extremity  Nothing seemed to improve his symptoms  His symptoms were constant in nature    ED Vital Signs:   ED Triage Vitals [05/22/18 1512]   Temperature Pulse Respirations Blood Pressure SpO2   97 7 °F (36 5 °C) 99 16 (!) 85/67 99 %      Temp Source Heart Rate Source Patient Position - Orthostatic VS BP Location FiO2 (%)   Temporal Monitor Sitting Right arm --      Pain Score       No Pain        Wt Readings from Last 1 Encounters:   05/23/18 59 6 kg (131 lb 6 3 oz)       Vital Signs (abnormal):   above    Abnormal Labs/Diagnostic Test Results:   NA  121  Chloride   85  Creat   0 55  AST   245  ALT    93  Alk phos   313  Albumin   2 8  Total bili   3 00  Lipase  401  Mg  1 2  H/H   8 3/25 6   ( adm)           7 2/22 4  (   5/23)  Ct  abd:   Stable calcification either within or immediately adjacent to the common bile duct as compared with study from 10/8/2017, without evidence of biliary obstruction  Fatty infiltration of liver  Small amount of pericholecystic fluid and gallstones noted   No gallbladder wall thickening seen      ED Treatment:   Medication Administration from 05/22/2018 1459 to 05/22/2018 1956       Date/Time Order Dose Route Action Action by Comments     05/22/2018 1838 vancomycin (VANCOCIN) 1,000 mg in sodium chloride 0 9 % 250 mL IVPB 0 mg/kg Intravenous Stopped Seven Bowles RN      05/22/2018 1708 vancomycin (VANCOCIN) 1,000 mg in sodium chloride 0 9 % 250 mL IVPB 1,000 mg Intravenous [address] [facility] Marc Valencia RN      05/22/2018 1708 piperacillin-tazobactam (ZOSYN) 3 375 g in sodium chloride 0 9 % 50 mL IVPB 0 g Intravenous Stopped Marc Valencia RN      05/22/2018 1626 piperacillin-tazobactam (ZOSYN) 3 375 g in sodium chloride 0 9 % 50 mL IVPB 3 375 g Intravenous [address] Marc Valencia RN      05/22/2018 1818 magnesium sulfate 2 g/50 mL IVPB (premix) 2 g 2 g Intravenous [address] [facility] Marc Valencia RN      05/22/2018 1750 iohexol (OMNIPAQUE) 350 MG/ML injection (SINGLE-DOSE) 100 mL 100 mL Intravenous Given Dayan Joshi Mackenzie      05/22/2018 1948 sodium chloride 0 9 % infusion 100 mL/hr Intravenous Norman Garduno RN           Past Medical/Surgical History:    Active Ambulatory Problems     Diagnosis Date Noted    Alcohol dependence ([facility] ) 05/22/2016    Tobacco use 05/22/2016    Essential hypertension 05/22/2016    COPD (chronic obstructive pulmonary disease) ([facility] ) 05/22/2016    H/O suicide attempt 05/22/2016    H/O cervical spine surgery 05/22/2016    Cholelithiasis 01/11/2017    Hyponatremia 01/11/2017    Anemia 01/11/2017    Hepatomegaly 01/12/2017    Hepatic steatosis 01/12/2017    Hypomagnesemia 01/12/2017    Elevated alkaline phosphatase level 01/14/2017    Transaminitis 03/07/2017    Vitamin D deficiency 03/07/2017    Iron deficiency 03/07/2017    Generalized weakness 05/17/2017    Body mass index (BMI) 21 0-21 9, adult 05/18/2017    Malnutrition of moderate degree ([facility] ) 05/18/2017    Seizure ([facility] ) 10/05/2017    Continuous chronic alcoholism ([facility] ) 10/05/2017    Incisional hernia 10/08/2017    Hx of seizure disorder 10/12/2017     Resolved Ambulatory Problems     Diagnosis Date Noted    Chest pain 05/22/2016    Sinus tachycardia 05/22/2016    Acute gastric ulcer with perforation ([facility] ) 10/25/2016    Postprocedural seroma of skin and subcutaneous tissue following other procedure 11/08/2016    Melena 11/26/2016    Intractable abdominal pain 01/11/2017    Acute pancreatitis 01/11/2017    Choledocholithiasis 01/11/2017    Hypokalemia 01/11/2017    Ventral hernia 01/11/2017    Acute cholecystitis 01/12/2017    Drop in hematocrit 01/13/2017    Common bile duct stone 03/07/2017    Gastritis 03/07/2017    Duodenitis 03/07/2017    Distended bladder 03/07/2017    Alcohol withdrawal syndrome ([facility] ) 03/07/2017    Urinary retention 03/08/2017    Right foot pain 03/08/2017    Structural abnormality of bladder 03/09/2017  Hypophosphatemia 03/09/2017    Lactic acidosis 03/11/2017    Elevated total protein 03/11/2017    Chronic abdominal wound infection 05/17/2017    Alcohol intoxication ([facility] ) 05/17/2017    Dysarthria 05/18/2017    Expressive aphasia 05/18/2017    Dehydration 05/18/2017    Closed fracture of multiple thoracic vertebrae ([facility] ) 07/29/2017    Cervical strain 07/29/2017    Weakness of left upper extremity 07/29/2017    Hyponatremia 07/29/2017    Hypokalemia 07/29/2017    Syncope 07/29/2017    Encephalopathy 07/29/2017     Past Medical History:   Diagnosis Date    Alcohol abuse     Bowel obstruction (HCC)     Bowel perforation (HCC)     Cardiac disease     Continuous chronic alcoholism ([facility] ) 10/5/2017    COPD (chronic obstructive pulmonary disease) (HCC)     History of shoulder surgery     History of transfusion     Hx of cervical spine surgery     Hypertension     Incisional hernia 10/8/2017    MI, old     Mitral regurgitation     Psychiatric disorder     Seizures (HCC)        Admitting Diagnosis: Hypomagnesemia [E83 42]  Hyperbilirubinemia [E80 6]  Hyponatremia [E87 1]  Hypoalbuminemia [E88 09]  Foot swelling [M79 89]  Alcoholism /alcohol abuse ([facility] ) [F10 20]  Transaminitis [R74 0]  Pedal edema [R60 0]  Cellulitis of right foot [D90 434]  Cellulitis of right leg [L03 115]    Age/Sex: 47 y o  male    Assessment/Plan:     Cellulitis of right lower extremity   Assessment & Plan     -admit to med/surg level of care  -telemetry is not needed  -check blood cultures x 2 sets  -check a MRSA nasal screen  -IV cefazolin  -normal saline IV fluids  -check a venous duplex of bilateral lower extremities          Hypomagnesemia   Assessment & Plan     -replete with a total of 4 g of IV magnesium sulfate now  -follow the magnesium level closely        Hyponatremia   Assessment & Plan     -this is likely multifactorial secondary to a hypovolemic hyponatremia and secondary to alcohol abuse with possible beer potomania  -normal saline IV fluids for now  -follow the sodium level closely  -check a urine sodium level and urine osmolality  -check TSH level  -if the hyponatremia does not improve, he will need a nephrology consultation          Diarrhea   Assessment & Plan     -check stool cultures including a Clostridium difficile culture and a rotavirus stool antigen test          Seizure-like activity ([facility] )   Assessment & Plan     -increase the patient's Keppra to 1500 mg every 12 hrs, which will be given in the IV form at this time  -check an EEG  -seizure precautions  -neuro checks every 4 hr  -check a CT scan of the head without contrast now     Transaminitis   Assessment & Plan     -likely secondary to alcohol abuse and hepatic steatosis  -check a chronic hepatitis panel  -follow the liver function tests          Hepatic steatosis   Assessment & Plan     -in the setting of alcohol abuse  -he will need outpatient surveillance imaging of his hepatic steatosis  -follow the liver function tests  -he should follow-up with Gastroenterology as an outpatient  -the best way to treat the hepatic steatosis is with alcohol abstinence, increasing the patient's amount of exercise, and improving the patient's diet        Anemia   Assessment & Plan     -the patient's hemoglobin has decreased since his last blood work in October 2017  -check a iron panel, a vitamin B12 level, and a folate level  -check the patient's stool for occult blood x 3 specimens  -follow the CBC  -transfuse for a hemoglobin less than 7          Tobacco use   Assessment & Plan     -nicotine patch  -smoking cessation counseling            Alcohol dependence ([facility] )   Assessment & Plan     -monitor closely for signs of delirium tremens  -normal saline IV fluids  -thiamine 100 mg IV x 1 dose now and then thiamine 100 mg p o  daily thereafter  -folic acid 1 mg IV x 1 dose now and then folic acid 1 mg p o  daily thereafter  -multivitamin daily  -the patient will be placed on standing Ativan 0 5 mg IV every 4 hrs to prevent delirium tremens  -I will also utilize Ativan 1 mg IV every 4 hrs p r n  for seizure activity or signs of delirium tremens  -I counseled patient on alcohol cessation  -I recommend alcohol rehabilitation for the patient     Anticipated Length of Stay:  Patient will be admitted on an Inpatient basis with an anticipated length of stay of greater than 2 midnights     Justification for Hospital Stay:  The patient requires IV antibiotics, IV fluids, a work-up for his seizure activity, and serial laboratory testing to monitor his electrolytes                 Admission Orders:    IP   5/22   @  1905  Scheduled Meds:   Current Facility-Administered Medications:  albuterol 2 5 mg Nebulization Q6H PRN Juan International, DO    cefazolin 1,000 mg Intravenous Q8H Juan International, DO Last Rate: 1,000 mg (05/23/18 1301)   cholecalciferol 1,000 Units Oral Daily Juan International, DO    cyanocobalamin 100 mcg Oral Daily Juan International, DO    enoxaparin 40 mg Subcutaneous Q24H Juan International, DO    folic acid 1 mg Oral Daily Juan International, DO    gabapentin 300 mg Oral TID Juan International, DO    HYDROmorphone 0 5 mg Intravenous Q3H PRN Juan International, DO    levETIRAcetam 1,500 mg Oral Q12H [facility] [place] HOME Deny Rosen MD    lisinopril 10 mg Oral Daily Juan International, DO    LORazepam 1 mg Intravenous Q4H PRN Juan International, DO    LORazepam 1 mg Intravenous Q6H Deny Rosen MD    multivitamin-minerals 1 tablet Oral Daily Juna International, DO    nicotine 1 patch Transdermal Daily Juan International, DO    ondansetron 4 mg Intravenous Q6H PRN Juan International, DO    oxyCODONE 5 mg Oral Q4H PRN Juan International, DO    potassium-sodium phosphateS 1 tablet Oral 4x Daily (with meals and at bedtime) Deny Rosen MD    sodium chloride 100 mL/hr Intravenous Continuous Zoey Mejía MD Last Rate: 100 mL/hr (05/23/18 1228)   thiamine 100 mg Oral Daily Juan International, DO Continuous Infusions:   sodium chloride 100 mL/hr Last Rate: 100 mL/hr (05/23/18 1228)     PRN Meds:   albuterol    HYDROmorphone    LORazepam    ondansetron    oxyCODONE     Reg diet  Seizure precautions  Contact isolation  EEG  Neuro  Checks  q 4 hrs  VAS   B/L  LE  PT/OT  Cons  GI  1  U  PRBC    Per  GI  Consult:  Alcoholic hepatitis              -  His LFTs are elevated with total bilirubin 2 20, alk phos 292,  and ALT 70  These are down trending from yesterday  -  His INR  and platelets are within normal limits  -  He had a CT abdomen/pelvis which showed fatty infiltration of the liver and a small amount of pericholecystic fluid and gallstones noted  Also seen was a stable calcification either within or immediately adjacent to the common bile duct, without evidence of biliary obstruction  No evidence of cirrhosis noted  -Discriminant function is 1, steroids are not indicated  -Alcohol cessation is imperative               -Continue to monitor LFTs      2  Abnormal CT finding              -He had a CT abdomen/pelvis which showed fatty infiltration of the liver and a small amount of pericholecystic fluid and gallstones noted  Also seen was a stable calcification either within or immediately adjacent to the common bile duct, without evidence of biliary obstruction               -Consider EUS as an outpatient for further evaluation      Diarrhea              -  He reported a 3 day history of diarrhea which resolved with Imodium, per the chart he had a solid bowel movement yesterday  -  C diff stool test and rotavirus stool antigen test are pending               -   Possibly secondary to alcohol consumption/withdrawal      4  Anemia, normochromic/normocytic              - he has acute on chronic anemia, his baseline from  Late 2017 appears to be 10-11, on admission his hemoglobin was 8 3 and today it is 7 2  Some dilutional effect  -  No signs of overt GI bleeding              -  His folate was low, folic acid ordered  - Iron panel showed low TIBC and serum iron consistent with anemia of chronic disease, likely due to his alcohol abuse  - he reports that he had a colonoscopy 2 years ago he believes this was negative however he does not recall what institution this was performed at or the performing physician so the report is not available  -  Recommend EGD and colonoscopy as an outpatient to complete workup for anemia  - continue monitor hemoglobin and transfuse necessary              - if he does develop signs of overt GI bleeding, consider inpatient scopes as appropriate  -   Plan was discussed with Dr Sebastien Restrepo  Thank you,  [facility] in the [facility] by Honorio Stafford for 2017  Network Utilization Review Department  Phone: [phone]; Fax [phone]  ATTENTION: The Network Utilization Review Department is now centralized for our 7 Facilities  Make a note that we have a new phone and fax numbers for our Department  Please call with any questions or concerns to [phone] and carefully follow the prompts so that you are directed to the right person  All voicemails are confidential  Fax any determinations, approvals, denials, and requests for initial or continue stay review clinical to [phone]  Due to HIGH CALL volume, it would be easier if you could please send faxed requests to expedite your requests and in part, help us provide discharge notifications faster

## 2018-05-23 NOTE — ASSESSMENT & PLAN NOTE
-increase the patient's Keppra to 1500 mg every 12 hrs, which will be given in the IV form at this time  -check an EEG  -seizure precautions  -neuro checks every 4 hr  -check a CT scan of the head without contrast now

## 2018-05-23 NOTE — ASSESSMENT & PLAN NOTE
-likely secondary to alcohol abuse and hepatic steatosis  -check a chronic hepatitis panel  -follow the liver function tests

## 2018-05-23 NOTE — ASSESSMENT & PLAN NOTE
-admit to med/surg level of care  -telemetry is not needed  -check blood cultures x 2 sets  -check a MRSA nasal screen  -IV cefazolin  -normal saline IV fluids  -check a venous duplex of bilateral lower extremities

## 2018-05-23 NOTE — ASSESSMENT & PLAN NOTE
monitor cl continue oral thiamine and folic acid supplementation  Ativan 1 mg IV q 6 hours  Ativan 1 mg IV p r n  q 4 hours   Monitor closely for signs of delirium tremens

## 2018-05-23 NOTE — PHYSICAL THERAPY NOTE
PT NOTE:          Order received and chart review performed  Pt with elevated HR at 125 to 145 and given increased dose of Ativan  Pt also to receive 1 unit of blood therefore will hold PT evaluation   Will reattempt in am

## 2018-05-23 NOTE — RESPIRATORY THERAPY NOTE
RT Protocol Note  Ghazal Kim 47 y o  male MRN: [id number]  Unit/Bed#: 557-85 Encounter: [id number]    Assessment    Principal Problem:    Cellulitis of right lower extremity  Active Problems:    Alcohol dependence ([facility] )    Tobacco use    Hyponatremia    Anemia    Hepatic steatosis    Hypomagnesemia    Transaminitis    Seizure-like activity (HCC)    Diarrhea      Home Pulmonary Medications:  Uses a prn albuteral MDI rescue inhaler, but not often       Past Medical History:   Diagnosis Date    Alcohol abuse     Bowel obstruction (HCC)     Bowel perforation (HCC)     Cardiac disease     Continuous chronic alcoholism ([facility] ) 10/5/2017    COPD (chronic obstructive pulmonary disease) ([place] [address] )     History of shoulder surgery     Right shoulder    History of transfusion     Hx of cervical spine surgery     Hypertension     Incisional hernia 10/8/2017    MI, old     Mitral regurgitation     Psychiatric disorder     Seizures ([place] [address] )      Social History     Social History    Marital status: Single     Spouse name: N/A    Number of children: N/A    Years of education: N/A     Social History Main Topics    Smoking status: Current Every Day Smoker     Packs/day: 2 00     Years: 15 00    Smokeless tobacco: Never Used    Alcohol use 3 6 oz/week     6 Cans of beer per week      Comment: 6 25oz cans a day    Drug use: No    Sexual activity: Yes     Other Topics Concern    None     Social History Narrative    None       Subjective    Subjective Data: he stated his breathing is fine, he only uses a rescue inhaler and not often    Objective  Prn bronchodialtor and oxygen therapy  Physical Exam:   Assessment Type: Assess only  General Appearance: Alert, Awake  Respiratory Pattern: Normal  Chest Assessment: Chest expansion symmetrical  Bilateral Breath Sounds: Diminished  Cough: Non-productive, Dry  O2 Device: room air    Vitals:  Blood pressure 102/64, pulse (!) 116, temperature 98 3 °F (36 8 °C), temperature source Temporal, resp  rate 18, height 5' 5" (1 651 m), weight 60 kg (132 lb 4 4 oz), SpO2 98 %  Imaging and other studies: I have personally reviewed pertinent reports        O2 Device: room air     Plan    Respiratory Plan: Home Bronchodilator Patient pathway  Airway Clearance Plan: Discontinue Protocol     Resp Comments: received patient in bed, on room air, in no distress, c/o leg pain, denying any sob

## 2018-05-23 NOTE — OCCUPATIONAL THERAPY NOTE
Occupational Therapy         Patient Name: Ayden Marks  [id number] Date: 5/23/2018      Order received and chart review performed; pt with elevated HR and not appropriate for OT evaluation at this time; will evaluate as able with pt is appropriate

## 2018-05-24 LAB
ALBUMIN SERPL BCP-MCNC: 2 G/DL (ref 3.5–5)
ALP SERPL-CCNC: 294 U/L (ref 46–116)
ALT SERPL W P-5'-P-CCNC: 62 U/L (ref 12–78)
ANION GAP SERPL CALCULATED.3IONS-SCNC: 8 MMOL/L (ref 4–13)
ANISOCYTOSIS BLD QL SMEAR: PRESENT
AST SERPL W P-5'-P-CCNC: 173 U/L (ref 5–45)
BASOPHILS # BLD MANUAL: 0 THOUSAND/UL (ref 0–0.1)
BASOPHILS NFR MAR MANUAL: 0 % (ref 0–1)
BILIRUB SERPL-MCNC: 2.1 MG/DL (ref 0.2–1)
BUN SERPL-MCNC: 2 MG/DL (ref 5–25)
CALCIUM SERPL-MCNC: 6.8 MG/DL (ref 8.3–10.1)
CHLORIDE SERPL-SCNC: 98 MMOL/L (ref 100–108)
CO2 SERPL-SCNC: 26 MMOL/L (ref 21–32)
CREAT SERPL-MCNC: 0.47 MG/DL (ref 0.6–1.3)
EOSINOPHIL # BLD MANUAL: 0.38 THOUSAND/UL (ref 0–0.4)
EOSINOPHIL NFR BLD MANUAL: 5 % (ref 0–6)
ERYTHROCYTE [DISTWIDTH] IN BLOOD BY AUTOMATED COUNT: 18.7 % (ref 11.6–15.1)
GFR SERPL CREATININE-BSD FRML MDRD: 129 ML/MIN/1.73SQ M
GLUCOSE SERPL-MCNC: 106 MG/DL (ref 65–140)
HCT VFR BLD AUTO: 26.6 % (ref 36.5–49.3)
HGB BLD-MCNC: 8.7 G/DL (ref 12–17)
HYPERCHROMIA BLD QL SMEAR: PRESENT
LYMPHOCYTES # BLD AUTO: 2.23 THOUSAND/UL (ref 0.6–4.47)
LYMPHOCYTES # BLD AUTO: 29 % (ref 14–44)
MAGNESIUM SERPL-MCNC: 1 MG/DL (ref 1.6–2.6)
MCH RBC QN AUTO: 29.9 PG (ref 26.8–34.3)
MCHC RBC AUTO-ENTMCNC: 32.7 G/DL (ref 31.4–37.4)
MCV RBC AUTO: 91 FL (ref 82–98)
MONOCYTES # BLD AUTO: 0.54 THOUSAND/UL (ref 0–1.22)
MONOCYTES NFR BLD: 7 % (ref 4–12)
MRSA NOSE QL CULT: NORMAL
NEUTROPHILS # BLD MANUAL: 4.46 THOUSAND/UL (ref 1.85–7.62)
NEUTS SEG NFR BLD AUTO: 58 % (ref 43–75)
PHOSPHATE SERPL-MCNC: 1.2 MG/DL (ref 2.7–4.5)
PLATELET # BLD AUTO: 215 THOUSANDS/UL (ref 149–390)
PLATELET BLD QL SMEAR: ADEQUATE
PMV BLD AUTO: 10.1 FL (ref 8.9–12.7)
POTASSIUM SERPL-SCNC: 2.8 MMOL/L (ref 3.5–5.3)
PROT SERPL-MCNC: 5.9 G/DL (ref 6.4–8.2)
RBC # BLD AUTO: 2.91 MILLION/UL (ref 3.88–5.62)
SODIUM SERPL-SCNC: 132 MMOL/L (ref 136–145)
STOMATOCYTES BLD QL SMEAR: PRESENT
TOTAL CELLS COUNTED SPEC: 100
VARIANT LYMPHS # BLD AUTO: 1 %
WBC # BLD AUTO: 7.69 THOUSAND/UL (ref 4.31–10.16)

## 2018-05-24 PROCEDURE — 83735 ASSAY OF MAGNESIUM: CPT | Performed by: FAMILY MEDICINE

## 2018-05-24 PROCEDURE — 99232 SBSQ HOSP IP/OBS MODERATE 35: CPT | Performed by: FAMILY MEDICINE

## 2018-05-24 PROCEDURE — 80053 COMPREHEN METABOLIC PANEL: CPT | Performed by: FAMILY MEDICINE

## 2018-05-24 PROCEDURE — 85007 BL SMEAR W/DIFF WBC COUNT: CPT | Performed by: FAMILY MEDICINE

## 2018-05-24 PROCEDURE — 85027 COMPLETE CBC AUTOMATED: CPT | Performed by: FAMILY MEDICINE

## 2018-05-24 PROCEDURE — 84100 ASSAY OF PHOSPHORUS: CPT | Performed by: FAMILY MEDICINE

## 2018-05-24 RX ORDER — MAGNESIUM SULFATE HEPTAHYDRATE 40 MG/ML
2 INJECTION, SOLUTION INTRAVENOUS ONCE
Status: COMPLETED | OUTPATIENT
Start: 2018-05-24 | End: 2018-05-24

## 2018-05-24 RX ORDER — POTASSIUM CHLORIDE 20 MEQ/1
40 TABLET, EXTENDED RELEASE ORAL
Status: DISCONTINUED | OUTPATIENT
Start: 2018-05-24 | End: 2018-05-25

## 2018-05-24 RX ORDER — MAGNESIUM SULFATE HEPTAHYDRATE 40 MG/ML
4 INJECTION, SOLUTION INTRAVENOUS ONCE
Status: DISCONTINUED | OUTPATIENT
Start: 2018-05-24 | End: 2018-05-24 | Stop reason: ALTCHOICE

## 2018-05-24 RX ORDER — MAGNESIUM SULFATE HEPTAHYDRATE 40 MG/ML
2 INJECTION, SOLUTION INTRAVENOUS
Status: DISPENSED | OUTPATIENT
Start: 2018-05-24 | End: 2018-05-24

## 2018-05-24 RX ORDER — LORAZEPAM 2 MG/ML
0.5 INJECTION INTRAMUSCULAR EVERY 6 HOURS
Status: COMPLETED | OUTPATIENT
Start: 2018-05-24 | End: 2018-05-24

## 2018-05-24 RX ADMIN — SODIUM CHLORIDE 100 ML/HR: 0.9 INJECTION, SOLUTION INTRAVENOUS at 04:09

## 2018-05-24 RX ADMIN — VITAMIN D, TAB 1000IU (100/BT) 1000 UNITS: 25 TAB at 08:57

## 2018-05-24 RX ADMIN — LORAZEPAM 0.5 MG: 2 INJECTION, SOLUTION INTRAMUSCULAR; INTRAVENOUS at 12:20

## 2018-05-24 RX ADMIN — DIBASIC SODIUM PHOSPHATE, MONOBASIC POTASSIUM PHOSPHATE AND MONOBASIC SODIUM PHOSPHATE 1 TABLET: 852; 155; 130 TABLET ORAL at 21:40

## 2018-05-24 RX ADMIN — FOLIC ACID 1 MG: 1 TABLET ORAL at 08:57

## 2018-05-24 RX ADMIN — POTASSIUM CHLORIDE 40 MEQ: 1500 TABLET, EXTENDED RELEASE ORAL at 16:44

## 2018-05-24 RX ADMIN — LEVETIRACETAM 1500 MG: 500 TABLET ORAL at 08:56

## 2018-05-24 RX ADMIN — DIBASIC SODIUM PHOSPHATE, MONOBASIC POTASSIUM PHOSPHATE AND MONOBASIC SODIUM PHOSPHATE 1 TABLET: 852; 155; 130 TABLET ORAL at 12:20

## 2018-05-24 RX ADMIN — LEVETIRACETAM 1500 MG: 500 TABLET ORAL at 20:06

## 2018-05-24 RX ADMIN — MAGNESIUM SULFATE HEPTAHYDRATE 2 G: 40 INJECTION, SOLUTION INTRAVENOUS at 08:57

## 2018-05-24 RX ADMIN — ENOXAPARIN SODIUM 40 MG: 40 INJECTION, SOLUTION INTRAVENOUS; SUBCUTANEOUS at 21:40

## 2018-05-24 RX ADMIN — GABAPENTIN 300 MG: 300 CAPSULE ORAL at 20:06

## 2018-05-24 RX ADMIN — CEFAZOLIN SODIUM 1000 MG: 1 SOLUTION INTRAVENOUS at 05:55

## 2018-05-24 RX ADMIN — GABAPENTIN 300 MG: 300 CAPSULE ORAL at 16:44

## 2018-05-24 RX ADMIN — LORAZEPAM 1 MG: 2 INJECTION, SOLUTION INTRAMUSCULAR; INTRAVENOUS at 05:09

## 2018-05-24 RX ADMIN — GABAPENTIN 300 MG: 300 CAPSULE ORAL at 08:57

## 2018-05-24 RX ADMIN — CEFAZOLIN SODIUM 1000 MG: 1 SOLUTION INTRAVENOUS at 14:40

## 2018-05-24 RX ADMIN — POTASSIUM PHOSPHATE, MONOBASIC AND POTASSIUM PHOSPHATE, DIBASIC 30 MMOL: 224; 236 INJECTION, SOLUTION INTRAVENOUS at 12:20

## 2018-05-24 RX ADMIN — LISINOPRIL 10 MG: 10 TABLET ORAL at 08:57

## 2018-05-24 RX ADMIN — POTASSIUM CHLORIDE 40 MEQ: 1500 TABLET, EXTENDED RELEASE ORAL at 08:57

## 2018-05-24 RX ADMIN — NICOTINE 1 PATCH: 7 PATCH, EXTENDED RELEASE TRANSDERMAL at 08:56

## 2018-05-24 RX ADMIN — DIBASIC SODIUM PHOSPHATE, MONOBASIC POTASSIUM PHOSPHATE AND MONOBASIC SODIUM PHOSPHATE 1 TABLET: 852; 155; 130 TABLET ORAL at 08:56

## 2018-05-24 RX ADMIN — THIAMINE HCL TAB 100 MG 100 MG: 100 TAB at 08:56

## 2018-05-24 RX ADMIN — DIBASIC SODIUM PHOSPHATE, MONOBASIC POTASSIUM PHOSPHATE AND MONOBASIC SODIUM PHOSPHATE 1 TABLET: 852; 155; 130 TABLET ORAL at 16:44

## 2018-05-24 RX ADMIN — VITAM B12 100 MCG: 100 TAB at 08:57

## 2018-05-24 RX ADMIN — POTASSIUM CHLORIDE 40 MEQ: 1500 TABLET, EXTENDED RELEASE ORAL at 12:20

## 2018-05-24 RX ADMIN — MAGNESIUM SULFATE HEPTAHYDRATE 2 G: 40 INJECTION, SOLUTION INTRAVENOUS at 20:06

## 2018-05-24 RX ADMIN — CEFAZOLIN SODIUM 1000 MG: 1 SOLUTION INTRAVENOUS at 21:40

## 2018-05-24 RX ADMIN — MULTIPLE VITAMINS W/ MINERALS TAB 1 TABLET: TAB at 08:57

## 2018-05-24 NOTE — ASSESSMENT & PLAN NOTE
Continue Ancef  No DVT on bilateral lower extremity venous Dopplers  Blood cultures negative times 24 hr  Will culture growing Staph aureus, suspect methicillin sensitive given patient improvement  Likely transition to Keflex on discharge

## 2018-05-24 NOTE — PROGRESS NOTES
Progress Note - Filomena Fitch 1965, 47 y o  male MRN: [id number]    Unit/Bed#: 408-77 Encounter: [id number]    Primary Care Provider: Dale Cruz PA-C   Date and time admitted to hospital: 5/22/2018  3:08 PM        * Cellulitis of right lower extremity   Assessment & Plan    Continue Ancef  No DVT on bilateral lower extremity venous Dopplers  Blood cultures negative times 24 hr  Will culture growing Staph aureus, suspect methicillin sensitive given patient improvement  Likely transition to Keflex on discharge        Anemia   Assessment & Plan    GI input appreciated he will require outpatient follow-up for EGD and colonoscopy  Hemoglobin stable status post 1 unit PRBCs        Seizure-like activity ([facility] )   Assessment & Plan    CT head reviewed  Prolactin is top-normal  Transitioned to p o  Keppra 1500 mg p o  b i d  Transaminitis   Assessment & Plan    Likely alcoholic hepatitis  Discussed with GI in  Continue to monitor LFTs  Total bili is improving, AST slightly increased        Tobacco use   Assessment & Plan    -nicotine patch  -smoking cessation counseling        Alcohol dependence ([facility] )   Assessment & Plan    continue oral thiamine and folic acid supplementation  Ativan   5 mg IV q 6 hours  Ativan 1 mg IV p r n  q 4 hours   Monitor closely for signs of delirium tremens          Will replete K, Mg, Phos orally and via IV   PT/OT     Progress Note - Filomena Fitch 47 y o  male MRN: [id number]    Unit/Bed#: 422-01 Encounter: [id number]        Subjective:   Seen and examined at bedside, states he feels better today  He feels weak overall,    Objective:     Vitals:   Vitals:    05/24/18 0747   BP: 111/74   Pulse: 103   Resp: 18   Temp: 99 1 °F (37 3 °C)   SpO2: 98%     Body mass index is 22 01 kg/m²      Intake/Output Summary (Last 24 hours) at 05/24/18 1051  Last data filed at 05/24/18 0857   Gross per 24 hour   Intake          3300 42 ml   Output              600 ml   Net          2700 42 ml Physical Exam:   /74 (BP Location: Right arm)   Pulse 103   Temp 99 1 °F (37 3 °C) (Temporal)   Resp 18   Ht 5' 5" (1 651 m)   Wt 60 kg (132 lb 4 4 oz)   SpO2 98%   BMI 22 01 kg/m²   General appearance: alert and oriented, in no acute distress  Head: Normocephalic, without obvious abnormality, atraumatic  Lungs:  Scattered rhonchi, no overt wheezes or rales  Heart:  S1-S2, tachycardic  Abdomen:  Ventral hernia noted that is easily reducible, no rebound guarding or rigidity  Extremities:  No peripheral edema clubbing or cyanosis, mild edema on right lower extremity  Skin:  Area of erythema on right lower extremity shows significant improvement and decrease in drainage    Neurologic: Grossly normal     Invasive Devices     Peripheral Intravenous Line            Peripheral IV 05/22/18 Left Antecubital 1 day                  Results from last 7 days  Lab Units 05/24/18  0534 05/23/18  0517 05/22/18  1545   WBC Thousand/uL 7 69 6 48 9 91   HEMOGLOBIN g/dL 8 7* 7 2* 8 3*   HEMATOCRIT % 26 6* 22 4* 25 6*   PLATELETS Thousands/uL 215 189 200         Results from last 7 days  Lab Units 05/24/18  0534 05/23/18  0517 05/22/18  1545   SODIUM mmol/L 132* 130* 121*   POTASSIUM mmol/L 2 8* 3 8 3 6   CHLORIDE mmol/L 98* 96* 85*   CO2 mmol/L 26 24 24   BUN mg/dL 2* 4* 6   CREATININE mg/dL 0 47* 0 55* 0 55*   CALCIUM mg/dL 6 8* 7 3* 7 8*   TOTAL PROTEIN g/dL 5 9* 6 0* 7 4   BILIRUBIN TOTAL mg/dL 2 10* 2 20* 3 00*   ALK PHOS U/L 294* 292* 313*   ALT U/L 62 70 93*   AST U/L 173* 168* 245*   GLUCOSE RANDOM mg/dL 106 105 88       Medication Administration - last 24 hours from 05/23/2018 1051 to 05/24/2018 1051       Date/Time Order Dose Route Action Action by     05/24/2018 0409 sodium chloride 0 9 % infusion 100 mL/hr Intravenous [address] Cornel[address] Davies RN     05/24/2018 0408 sodium chloride 0 9 % infusion 0 mL/hr Intravenous Stopped Abdifatah Pappas RN     05/23/2018 4161 sodium chloride 0 9 % infusion 100 mL/hr Intravenous [address] Daiana Jevon, [place]     05/23/2018 1228 sodium chloride 0 9 % infusion 100 mL/hr Intravenous Rate/Dose Change Hammad Frankel RN     05/24/2018 1959 thiamine (VITAMIN B1) tablet 100 mg 100 mg Oral Given Hammad Frankel RN     05/24/2018 0555 ceFAZolin (ANCEF) IVPB (premix) 1,000 mg 1,000 mg Intravenous [facility] MARIANNE Mondragon     05/23/2018 2117 ceFAZolin (ANCEF) IVPB (premix) 1,000 mg 1,000 mg Intravenous [address] Daiana Escoto RN     05/23/2018 1301 ceFAZolin (ANCEF) IVPB (premix) 1,000 mg 1,000 mg Intravenous [address] [facility]     05/24/2018 6666 cholecalciferol (VITAMIN D3) tablet 1,000 Units 1,000 Units Oral Given Hammad Frankel RN     05/24/2018 2549 cyanocobalamin (VITAMIN B-12) tablet 100 mcg 100 mcg Oral Given Hammad Frankel RN     26/67/3016 1960 folic acid (FOLVITE) tablet 1 mg 1 mg Oral Given Hammad Frankel RN     05/24/2018 0857 gabapentin (NEURONTIN) capsule 300 mg 300 mg Oral Given Hammad Frankel RN     05/23/2018 2113 gabapentin (NEURONTIN) capsule 300 mg 300 mg Oral Given Daiana Escoto RN     05/23/2018 1622 gabapentin (NEURONTIN) capsule 300 mg 300 mg Oral Given Daiana Escoto RN     05/24/2018 0857 lisinopril (ZESTRIL) tablet 10 mg 10 mg Oral Given Hammad Frankel RN     05/24/2018 0856 nicotine (NICODERM CQ) 7 mg/24hr TD 24 hr patch 1 patch 1 patch Transdermal Medication Applied Hammad Frankel RN     05/24/2018 6090 nicotine (NICODERM CQ) 7 mg/24hr TD 24 hr patch 1 patch 1 patch Transdermal Patch Removed Hammad Frankel RN     05/23/2018 2117 enoxaparin (LOVENOX) subcutaneous injection 40 mg 40 mg Subcutaneous Given Daiana Escoto RN     05/24/2018 0857 multivitamin-minerals (CENTRUM) tablet 1 tablet 1 tablet Oral Given Hammad Frankel RN     05/24/2018 0856 potassium-sodium phosphateS (K-PHOS,PHOSPHA 250) -250 mg tablet 1 tablet 1 tablet Oral Given Hammad Frankel RN     05/23/2018 2113 potassium-sodium phosphateS (K-PHOS,PHOSPHA 250) -250 mg tablet 1 tablet 1 tablet Oral Given Noreen Vital RN     05/23/2018 1622 potassium-sodium phosphateS (K-PHOS,PHOSPHA 250) -250 mg tablet 1 tablet 1 tablet Oral Given Noreen Vital RN     05/23/2018 1230 potassium-sodium phosphateS (K-PHOS,PHOSPHA 250) -250 mg tablet 1 tablet 1 tablet Oral Given Caro Hollingsworth RN     05/24/2018 0509 LORazepam (ATIVAN) 2 mg/mL injection 1 mg 1 mg Intravenous Given Gabriela Avila RN     05/23/2018 2347 LORazepam (ATIVAN) 2 mg/mL injection 1 mg 1 mg Intravenous Given Gabriela Avila RN     05/23/2018 1804 LORazepam (ATIVAN) 2 mg/mL injection 1 mg 1 mg Intravenous Given Noreen Vital RN     05/24/2018 0856 levETIRAcetam (KEPPRA) tablet 1,500 mg 1,500 mg Oral Given Caro Hollingsworth RN     05/23/2018 2113 levETIRAcetam (KEPPRA) tablet 1,500 mg 1,500 mg Oral Given Noreen Vital RN     05/23/2018 1230 levETIRAcetam (KEPPRA) tablet 1,500 mg 1,500 mg Oral Given Caro Hollingsworth RN     05/24/2018 0857 potassium chloride (K-DUR,KLOR-CON) CR tablet 40 mEq 40 mEq Oral Given Caro Hollingsworth RN     05/24/2018 8205 magnesium sulfate 4 g/100 mL IVPB (premix) 4 g 4 g Intravenous Not Given Caro Hollingsworth RN     05/24/2018 0897 magnesium sulfate 2 g/50 mL IVPB (premix) 2 g 2 g Intravenous New Bag Caro Hollingsworth RN            Lab, Imaging and other studies: I have personally reviewed pertinent reports      VTE Pharmacologic Prophylaxis: Enoxaparin (Lovenox)  VTE Mechanical Prophylaxis: sequential compression device     Viktoria Jeffery MD  5/24/2018,10:51 AM

## 2018-05-24 NOTE — ASSESSMENT & PLAN NOTE
continue oral thiamine and folic acid supplementation  Ativan   5 mg IV q 6 hours  Ativan 1 mg IV p r n  q 4 hours   Monitor closely for signs of delirium tremens

## 2018-05-24 NOTE — SOCIAL WORK
Cm met with the patient to evaluate the patients prior function and living situation and any barriers to d/c and form a safe d/c plan  Cm also evaluated the patient for any services in the home or needs for services  Pt resides at home with his wife in a house  Has 2 TIFF then stays on one floor (sleeps on a recliner and has a BSC)  Pt states he is independent with his adls and ambulation  Pt states his wife does the driving  PCP is Edgar Hunt and pharmacy is [facility] in [place]  Cm discussed with pt drug and alcohol (inpatient /outpatient)-pt declining, states he has drastically cut back and only drinks 3 beers a day  Pt also not interested in inpatient STR, but is agreeable to homecare:RN, PT coming in  Cm will continue to follow and assist in dc planning

## 2018-05-24 NOTE — ASSESSMENT & PLAN NOTE
Likely alcoholic hepatitis  Discussed with GI in  Continue to monitor LFTs  Total bili is improving, AST slightly increased

## 2018-05-24 NOTE — PHYSICAL THERAPY NOTE
PT NOTE:               Pt still with sustained HR at 125  Will speak with MD regarding medical hold for elevated HR or if okay to proceed with PT evaluation and complete as able

## 2018-05-24 NOTE — ASSESSMENT & PLAN NOTE
GI input appreciated he will require outpatient follow-up for EGD and colonoscopy  Hemoglobin stable status post 1 unit PRBCs

## 2018-05-24 NOTE — PLAN OF CARE
Problem: DISCHARGE PLANNING - CARE MANAGEMENT  Goal: Discharge to post-acute care or home with appropriate resources  INTERVENTIONS:  - Conduct assessment to determine patient/family and health care team treatment goals, and need for post-acute services based on payer coverage, community resources, and patient preferences, and barriers to discharge  - Address psychosocial, clinical, and financial barriers to discharge as identified in assessment in conjunction with the patient/family and health care team  - Arrange appropriate level of post-acute services according to patient's   needs and preference and payer coverage in collaboration with the physician and health care team  - Communicate with and update the patient/family, physician, and health care team regarding progress on the discharge plan  - Arrange appropriate transportation to post-acute venues  Outcome: Progressing  -declining inpatient/outpatient drug and alcohol  -encourage alcohol cessation  -probable homecare:RN and PT on dc

## 2018-05-25 ENCOUNTER — TELEPHONE (OUTPATIENT)
Dept: GASTROENTEROLOGY | Facility: CLINIC | Age: 53
End: 2018-05-25

## 2018-05-25 LAB
ALBUMIN SERPL BCP-MCNC: 2 G/DL (ref 3.5–5)
ALP SERPL-CCNC: 257 U/L (ref 46–116)
ALT SERPL W P-5'-P-CCNC: 63 U/L (ref 12–78)
ANION GAP SERPL CALCULATED.3IONS-SCNC: 6 MMOL/L (ref 4–13)
ANISOCYTOSIS BLD QL SMEAR: PRESENT
AST SERPL W P-5'-P-CCNC: 194 U/L (ref 5–45)
BACTERIA WND AEROBE CULT: ABNORMAL
BASO STIPL BLD QL SMEAR: PRESENT
BASOPHILS # BLD MANUAL: 0 THOUSAND/UL (ref 0–0.1)
BASOPHILS NFR MAR MANUAL: 0 % (ref 0–1)
BILIRUB SERPL-MCNC: 2.3 MG/DL (ref 0.2–1)
BUN SERPL-MCNC: 2 MG/DL (ref 5–25)
CALCIUM SERPL-MCNC: 7.1 MG/DL (ref 8.3–10.1)
CHLORIDE SERPL-SCNC: 95 MMOL/L (ref 100–108)
CO2 SERPL-SCNC: 29 MMOL/L (ref 21–32)
CREAT SERPL-MCNC: 0.49 MG/DL (ref 0.6–1.3)
EOSINOPHIL # BLD MANUAL: 0 THOUSAND/UL (ref 0–0.4)
EOSINOPHIL NFR BLD MANUAL: 0 % (ref 0–6)
ERYTHROCYTE [DISTWIDTH] IN BLOOD BY AUTOMATED COUNT: 20.1 % (ref 11.6–15.1)
GFR SERPL CREATININE-BSD FRML MDRD: 127 ML/MIN/1.73SQ M
GLUCOSE SERPL-MCNC: 95 MG/DL (ref 65–140)
GRAM STN SPEC: ABNORMAL
HCT VFR BLD AUTO: 27.4 % (ref 36.5–49.3)
HGB BLD-MCNC: 8.8 G/DL (ref 12–17)
HYPERCHROMIA BLD QL SMEAR: PRESENT
LYMPHOCYTES # BLD AUTO: 2.56 THOUSAND/UL (ref 0.6–4.47)
LYMPHOCYTES # BLD AUTO: 24 % (ref 14–44)
MAGNESIUM SERPL-MCNC: 1.2 MG/DL (ref 1.6–2.6)
MCH RBC QN AUTO: 29.6 PG (ref 26.8–34.3)
MCHC RBC AUTO-ENTMCNC: 32.1 G/DL (ref 31.4–37.4)
MCV RBC AUTO: 92 FL (ref 82–98)
MONOCYTES # BLD AUTO: 1.39 THOUSAND/UL (ref 0–1.22)
MONOCYTES NFR BLD: 13 % (ref 4–12)
NEUTROPHILS # BLD MANUAL: 6.5 THOUSAND/UL (ref 1.85–7.62)
NEUTS BAND NFR BLD MANUAL: 10 % (ref 0–8)
NEUTS SEG NFR BLD AUTO: 51 % (ref 43–75)
PHOSPHATE SERPL-MCNC: 1.4 MG/DL (ref 2.7–4.5)
PLATELET # BLD AUTO: 229 THOUSANDS/UL (ref 149–390)
PLATELET BLD QL SMEAR: ADEQUATE
PMV BLD AUTO: 10.4 FL (ref 8.9–12.7)
POLYCHROMASIA BLD QL SMEAR: PRESENT
POTASSIUM SERPL-SCNC: 3.7 MMOL/L (ref 3.5–5.3)
PROT SERPL-MCNC: 6 G/DL (ref 6.4–8.2)
RBC # BLD AUTO: 2.97 MILLION/UL (ref 3.88–5.62)
RBC MORPH BLD: PRESENT
SCHISTOCYTES BLD QL SMEAR: PRESENT
SODIUM SERPL-SCNC: 130 MMOL/L (ref 136–145)
SPHEROCYTES BLD QL SMEAR: PRESENT
TARGETS BLD QL SMEAR: PRESENT
TOTAL CELLS COUNTED SPEC: 100
VARIANT LYMPHS # BLD AUTO: 2 %
WBC # BLD AUTO: 10.66 THOUSAND/UL (ref 4.31–10.16)

## 2018-05-25 PROCEDURE — 99232 SBSQ HOSP IP/OBS MODERATE 35: CPT | Performed by: FAMILY MEDICINE

## 2018-05-25 PROCEDURE — G8979 MOBILITY GOAL STATUS: HCPCS | Performed by: PHYSICAL THERAPIST

## 2018-05-25 PROCEDURE — 97116 GAIT TRAINING THERAPY: CPT | Performed by: PHYSICAL THERAPIST

## 2018-05-25 PROCEDURE — 85027 COMPLETE CBC AUTOMATED: CPT | Performed by: FAMILY MEDICINE

## 2018-05-25 PROCEDURE — 80053 COMPREHEN METABOLIC PANEL: CPT | Performed by: FAMILY MEDICINE

## 2018-05-25 PROCEDURE — 85007 BL SMEAR W/DIFF WBC COUNT: CPT | Performed by: FAMILY MEDICINE

## 2018-05-25 PROCEDURE — 83735 ASSAY OF MAGNESIUM: CPT | Performed by: FAMILY MEDICINE

## 2018-05-25 PROCEDURE — 97163 PT EVAL HIGH COMPLEX 45 MIN: CPT | Performed by: PHYSICAL THERAPIST

## 2018-05-25 PROCEDURE — G8988 SELF CARE GOAL STATUS: HCPCS

## 2018-05-25 PROCEDURE — 97535 SELF CARE MNGMENT TRAINING: CPT

## 2018-05-25 PROCEDURE — G8978 MOBILITY CURRENT STATUS: HCPCS | Performed by: PHYSICAL THERAPIST

## 2018-05-25 PROCEDURE — 84100 ASSAY OF PHOSPHORUS: CPT | Performed by: FAMILY MEDICINE

## 2018-05-25 PROCEDURE — G8987 SELF CARE CURRENT STATUS: HCPCS

## 2018-05-25 PROCEDURE — 97167 OT EVAL HIGH COMPLEX 60 MIN: CPT

## 2018-05-25 PROCEDURE — 93970 EXTREMITY STUDY: CPT | Performed by: SURGERY

## 2018-05-25 RX ORDER — MAGNESIUM SULFATE HEPTAHYDRATE 40 MG/ML
2 INJECTION, SOLUTION INTRAVENOUS
Status: COMPLETED | OUTPATIENT
Start: 2018-05-25 | End: 2018-05-25

## 2018-05-25 RX ORDER — LORAZEPAM 1 MG/1
1 TABLET ORAL 3 TIMES DAILY
Status: DISCONTINUED | OUTPATIENT
Start: 2018-05-25 | End: 2018-05-27

## 2018-05-25 RX ORDER — OXYCODONE HYDROCHLORIDE 5 MG/1
5 TABLET ORAL 2 TIMES DAILY PRN
Status: DISCONTINUED | OUTPATIENT
Start: 2018-05-25 | End: 2018-06-07 | Stop reason: HOSPADM

## 2018-05-25 RX ADMIN — DIBASIC SODIUM PHOSPHATE, MONOBASIC POTASSIUM PHOSPHATE AND MONOBASIC SODIUM PHOSPHATE 1 TABLET: 852; 155; 130 TABLET ORAL at 15:50

## 2018-05-25 RX ADMIN — VITAM B12 100 MCG: 100 TAB at 09:22

## 2018-05-25 RX ADMIN — LORAZEPAM 1 MG: 1 TABLET ORAL at 21:55

## 2018-05-25 RX ADMIN — DIBASIC SODIUM PHOSPHATE, MONOBASIC POTASSIUM PHOSPHATE AND MONOBASIC SODIUM PHOSPHATE 1 TABLET: 852; 155; 130 TABLET ORAL at 21:55

## 2018-05-25 RX ADMIN — MAGNESIUM SULFATE HEPTAHYDRATE 2 G: 40 INJECTION, SOLUTION INTRAVENOUS at 12:56

## 2018-05-25 RX ADMIN — LEVETIRACETAM 1500 MG: 500 TABLET ORAL at 09:23

## 2018-05-25 RX ADMIN — NICOTINE 1 PATCH: 7 PATCH, EXTENDED RELEASE TRANSDERMAL at 09:26

## 2018-05-25 RX ADMIN — OXYCODONE HYDROCHLORIDE 5 MG: 5 TABLET ORAL at 23:34

## 2018-05-25 RX ADMIN — SODIUM CHLORIDE 100 ML/HR: 0.9 INJECTION, SOLUTION INTRAVENOUS at 09:19

## 2018-05-25 RX ADMIN — GABAPENTIN 300 MG: 300 CAPSULE ORAL at 21:55

## 2018-05-25 RX ADMIN — LORAZEPAM 1 MG: 1 TABLET ORAL at 14:17

## 2018-05-25 RX ADMIN — CEFAZOLIN SODIUM 1000 MG: 1 SOLUTION INTRAVENOUS at 21:56

## 2018-05-25 RX ADMIN — DIBASIC SODIUM PHOSPHATE, MONOBASIC POTASSIUM PHOSPHATE AND MONOBASIC SODIUM PHOSPHATE 1 TABLET: 852; 155; 130 TABLET ORAL at 09:26

## 2018-05-25 RX ADMIN — CEFAZOLIN SODIUM 1000 MG: 1 SOLUTION INTRAVENOUS at 05:23

## 2018-05-25 RX ADMIN — GABAPENTIN 300 MG: 300 CAPSULE ORAL at 15:50

## 2018-05-25 RX ADMIN — THIAMINE HCL TAB 100 MG 100 MG: 100 TAB at 09:26

## 2018-05-25 RX ADMIN — FOLIC ACID 1 MG: 1 TABLET ORAL at 09:23

## 2018-05-25 RX ADMIN — GABAPENTIN 300 MG: 300 CAPSULE ORAL at 09:23

## 2018-05-25 RX ADMIN — CEFAZOLIN SODIUM 1000 MG: 1 SOLUTION INTRAVENOUS at 15:51

## 2018-05-25 RX ADMIN — MULTIPLE VITAMINS W/ MINERALS TAB 1 TABLET: TAB at 09:24

## 2018-05-25 RX ADMIN — MAGNESIUM SULFATE HEPTAHYDRATE 2 G: 40 INJECTION, SOLUTION INTRAVENOUS at 09:19

## 2018-05-25 RX ADMIN — VITAMIN D, TAB 1000IU (100/BT) 1000 UNITS: 25 TAB at 09:22

## 2018-05-25 RX ADMIN — DIBASIC SODIUM PHOSPHATE, MONOBASIC POTASSIUM PHOSPHATE AND MONOBASIC SODIUM PHOSPHATE 1 TABLET: 852; 155; 130 TABLET ORAL at 12:56

## 2018-05-25 RX ADMIN — LISINOPRIL 10 MG: 10 TABLET ORAL at 09:23

## 2018-05-25 RX ADMIN — POTASSIUM PHOSPHATE, MONOBASIC AND POTASSIUM PHOSPHATE, DIBASIC 30 MMOL: 224; 236 INJECTION, SOLUTION INTRAVENOUS at 14:16

## 2018-05-25 RX ADMIN — LEVETIRACETAM 1500 MG: 500 TABLET ORAL at 21:55

## 2018-05-25 RX ADMIN — ENOXAPARIN SODIUM 40 MG: 40 INJECTION, SOLUTION INTRAVENOUS; SUBCUTANEOUS at 21:56

## 2018-05-25 RX ADMIN — SODIUM CHLORIDE 500 ML: 900 INJECTION, SOLUTION INTRAVENOUS at 16:09

## 2018-05-25 NOTE — PLAN OF CARE
Problem: Potential for Falls  Goal: Patient will remain free of falls  INTERVENTIONS:  - Assess patient frequently for physical needs  -  Identify cognitive and physical deficits and behaviors that affect risk of falls    -  [place] fall precautions as indicated by assessment   - Educate patient/family on patient safety including physical limitations  - Instruct patient to call for assistance with activity based on assessment  - Modify environment to reduce risk of injury  - Consider OT/PT consult to assist with strengthening/mobility   Outcome: Progressing      Problem: PAIN - ADULT  Goal: Verbalizes/displays adequate comfort level or baseline comfort level  Interventions:  - Encourage patient to monitor pain and request assistance  - Assess pain using appropriate pain scale  - Administer analgesics based on type and severity of pain and evaluate response  - Implement non-pharmacological measures as appropriate and evaluate response  - Consider cultural and social influences on pain and pain management  - Notify physician/advanced practitioner if interventions unsuccessful or patient reports new pain   Outcome: Progressing      Problem: INFECTION - ADULT  Goal: Absence or prevention of progression during hospitalization  INTERVENTIONS:  - Assess and monitor for signs and symptoms of infection  - Monitor lab/diagnostic results    - [place] appropriate cooling/warming therapies per order  - Administer medications as ordered  - Instruct and encourage patient and family to use good hand hygiene technique  - Identify and instruct in appropriate isolation precautions for identified infection/condition   Outcome: Progressing      Problem: SAFETY ADULT  Goal: Maintain or return to baseline ADL function  INTERVENTIONS:  -  Assess patient's ability to carry out ADLs; assess patient's baseline for ADL function and identify physical deficits which impact ability to perform ADLs (bathing, care of mouth/teeth, toileting, grooming, dressing, etc )  - Assess/evaluate cause of self-care deficits   - Assess range of motion  - Assess patient's mobility; develop plan if impaired  - Assess patient's need for assistive devices and provide as appropriate  - Encourage maximum independence but intervene and supervise when necessary  ¯ Involve family in performance of ADLs  ¯ Assess for home care needs following discharge   ¯ Request OT consult to assist with ADL evaluation and planning for discharge  ¯ Provide patient education as appropriate   Outcome: Progressing    Goal: Maintain or return mobility status to optimal level  INTERVENTIONS:  - Assess patient's baseline mobility status (ambulation, transfers, stairs, etc )    - Identify cognitive and physical deficits and behaviors that affect mobility  - Identify mobility aids required to assist with transfers and/or ambulation (gait belt, sit-to-stand, lift, walker, cane, etc )  - [place] fall precautions as indicated by assessment  - Record patient progress and toleration of activity level on Mobility SBAR; progress patient to next Phase/Stage  - Instruct patient to call for assistance with activity based on assessment  - Request Rehabilitation consult to assist with strengthening/weightbearing, etc    Outcome: Progressing      Problem: DISCHARGE PLANNING  Goal: Discharge to home or other facility with appropriate resources  INTERVENTIONS:  - Identify barriers to discharge w/patient and caregiver  - Arrange for needed discharge resources and transportation as appropriate  - Identify discharge learning needs (meds, wound care, etc )  - Arrange for interpretive services to assist at discharge as needed  - Refer to Case Management Department for coordinating discharge planning if the patient needs post-hospital services based on physician/advanced practitioner order or complex needs related to functional status, cognitive ability, or social support system   Outcome: Progressing      Problem: Knowledge Deficit  Goal: Patient/family/caregiver demonstrates understanding of disease process, treatment plan, medications, and discharge instructions  Complete learning assessment and assess knowledge base    Interventions:  - Provide teaching at level of understanding  - Provide teaching via preferred learning methods   Outcome: Progressing      Problem: Prexisting or High Potential for Compromised Skin Integrity  Goal: Skin integrity is maintained or improved  INTERVENTIONS:  - Identify patients at risk for skin breakdown  - Assess and monitor skin integrity  - Assess and monitor nutrition and hydration status  - Monitor labs (i e  albumin)  - Assess for incontinence   - Turn and reposition patient  - Assist with mobility/ambulation  - Relieve pressure over bony prominences  - Avoid friction and shearing  - Provide appropriate hygiene as needed including keeping skin clean and dry  - Evaluate need for skin moisturizer/barrier cream  - Collaborate with interdisciplinary team (i e  Nutrition, Rehabilitation, etc )   - Patient/family teaching   Outcome: Progressing      Problem: DISCHARGE PLANNING - CARE MANAGEMENT  Goal: Discharge to post-acute care or home with appropriate resources  INTERVENTIONS:  - Conduct assessment to determine patient/family and health care team treatment goals, and need for post-acute services based on payer coverage, community resources, and patient preferences, and barriers to discharge  - Address psychosocial, clinical, and financial barriers to discharge as identified in assessment in conjunction with the patient/family and health care team  - Arrange appropriate level of post-acute services according to patient's   needs and preference and payer coverage in collaboration with the physician and health care team  - Communicate with and update the patient/family, physician, and health care team regarding progress on the discharge plan  - Arrange appropriate transportation to post-acute venues   Outcome: Progressing      Problem: Nutrition/Hydration-ADULT  Goal: Nutrient/Hydration intake appropriate for improving, restoring or maintaining nutritional needs  Monitor and assess patient's nutrition/hydration status for malnutrition (ex- brittle hair, bruises, dry skin, pale skin and conjunctiva, muscle wasting, smooth red tongue, and disorientation)  Collaborate with interdisciplinary team and initiate plan and interventions as ordered  Monitor patient's weight and dietary intake as ordered or per policy  Utilize nutrition screening tool and intervene per policy  Determine patient's food preferences and provide high-protein, high-caloric foods as appropriate       INTERVENTIONS:  - Monitor oral intake, urinary output, labs, and treatment plans  - Assess nutrition and hydration status and recommend course of action  - Evaluate amount of meals eaten  - Assist patient with eating if necessary   - Allow adequate time for meals  - Recommend/ encourage appropriate diets, oral nutritional supplements, and vitamin/mineral supplements  - Order, calculate, and assess calorie counts as needed  - Recommend, monitor, and adjust tube feedings and TPN/PPN based on assessed needs  - Assess need for intravenous fluids  - Provide specific nutrition/hydration education as appropriate  - Include patient/family/caregiver in decisions related to nutrition   Outcome: Progressing

## 2018-05-25 NOTE — PHYSICAL THERAPY NOTE
PT Evaluation     05/25/18 0951   Note Type   Note type Eval/Treat   Pain Assessment   Pain Score 7   Pain Location Leg   Pain Orientation Right   Home Living   Type of Home House   Home Layout Multi-level   Bathroom Toilet Standard   Bathroom Equipment Commode   Bathroom Accessibility Accessible   Home Equipment Quad cane; Wheelchair-manual;Walker   Prior Function   Level of [place] Needs assistance with ADLs and functional mobility  (ambulates with quad cane)   Lives With Family  (ex-wife)   Receives Help From Family   ADL Assistance Independent  ((I) ADL's, ex-wife assists as needed)   IADLs Needs assistance   Falls in the last 6 months 5 to 10   Comments [employer] 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   Restrictions/Precautions   Other Precautions Multiple lines;Telemetry; Fall Risk;Pain;Bed Alarm   General   Family/Caregiver Present No   Cognition   Arousal/Participation Alert   Orientation Level Oriented X4   Following Commands Follows all commands and directions without difficulty   RLE Assessment   RLE Assessment WFL  (hip flex 4/5, knee 4-/5 ankle 3+/5)   LLE Assessment   LLE Assessment WFL  (hip 4-/5 knee 4-/5 ankle 4-/5)   Coordination   Sensation WFL   Light Touch   RLE Light Touch Grossly intact   LLE Light Touch Grossly intact   Bed Mobility   Supine to Sit 4  Minimal assistance   Additional items Assist x 1;Verbal cues; Increased time required; Bedrails;LE management   Sit to Supine 4  Minimal assistance   Additional items Assist x 1;Verbal cues; Bedrails;LE management   Additional Comments pt with HR  at rest and with exertion 124-127 and SOB   Transfers   Sit to Stand 4  Minimal assistance   Additional items Assist x 2; Increased time required;Verbal cues  (with quad cane)   Stand to Sit 4  Minimal assistance   Additional items Assist x 2;Verbal cues  (with quad cane)   Stand pivot 4  Minimal assistance   Additional items Assist x 1;Verbal cues  (with quad cane)   Additional Comments pt with unsteadiness requiring min(A)x1-2 for all transfers and ambulation  Pt with forward flexed posture and poor balance  Pt at increased risk for falls   Ambulation/Elevation   Gait pattern Short stride; Step to;Decreased foot clearance  (requiring quad cane and use of IV pole)   Gait Assistance 4  Minimal assist   Additional items Assist x 1   Assistive Device Large base quad cane  (with IV pole)   Distance 20ft then 10ft with quad cane and IV pole and min(A) for balance  Pt with fatigue SOB elevated HR to 125 and 2 near losses of balance requiring min(A)   Balance   Static Sitting Good   Dynamic Sitting Fair +   Static Standing Fair  (with quad cane)   Dynamic Standing Fair -  (with quad cane)   Ambulatory Fair -  (with quad cane)   Endurance Deficit   Endurance Deficit Yes   Endurance Deficit Description decreased tolerance for activity and ambulation due to decreased balance fatigue and elevated HR  leg pain also limiting ambulation   Activity Tolerance   Activity Tolerance Patient limited by fatigue;Patient limited by pain   Assessment   Prognosis Good   Problem List Decreased strength;Decreased endurance; Impaired balance;Decreased mobility; Decreased safety awareness;Pain   Assessment Pt is a 47year old male presenting with increased redness and pain L leg limiting ambulation tolerance  Pt with decreased balance mobility transfers and ambulation requiring assistance for all bed mobility transfers and ambulation with limited ambulation tolerance  Pt is in need of continued activity in PT to improve impairments and functional deficits  Pt is unsafe to return home and will require short term rehab on discharge      Goals   Patient Goals To feel better and return home   LTG Expiration Date 06/08/18   Long Term Goal #1 (S) with all bed mobility and transfers with quad cane or RW   Long Term Goal #2 Pt will ambulate 150ft with quad cane (S) and will ascend/descend 4 steps with HR (S)   Plan   Treatment/Interventions Functional transfer training;LE strengthening/ROM; Elevations; Therapeutic exercise; Endurance training;Patient/family training;Bed mobility;Gait training   PT Frequency (3-5x/wk)   Recommendation   Recommendation Short-term skilled PT   Equipment Recommended Walker   PT - OK to Discharge No  (to next level of care when medically stable for discharge)   no SCD's  Pt supine in bed with call bell in reach and bed alarm on

## 2018-05-25 NOTE — ASSESSMENT & PLAN NOTE
Continue Ancef  No DVT on bilateral lower extremity venous Dopplers  Blood cultures negative   Will culture growing Staph aureus, suspect methicillin sensitive given patient improvement  Likely transition to Keflex on discharge

## 2018-05-25 NOTE — PROGRESS NOTES
Progress Note - Deng Jeffriesn 1965, 47 y o  male MRN: [id number]    Unit/Bed#: 422-01 Encounter: [id number]    Primary Care Provider: Dino Boudreaux PA-C   Date and time admitted to hospital: 5/22/2018  3:08 PM        * Cellulitis of right lower extremity   Assessment & Plan    Continue Ancef  No DVT on bilateral lower extremity venous Dopplers  Blood cultures negative   Will culture growing Staph aureus, suspect methicillin sensitive given patient improvement  Likely transition to Keflex on discharge        Anemia   Assessment & Plan    GI input appreciated he will require outpatient follow-up for EGD and colonoscopy  Hemoglobin stable status post 1 unit PRBCs        Transaminitis   Assessment & Plan    Likely alcoholic hepatitis  Discussed with GI in  Continue to monitor LFTs  Total bili is  stable  AST slightly worse today        Alcohol dependence ([facility] [address] )   Assessment & Plan    Continue vitamin supplementation  Continue replete electrolytes  Continue IV fluids  P o  Ativan t i d  1 mg  IV Ativan p r n  Progress Note - Deng Griffin 47 y o  male MRN: [id number]    Unit/Bed#: 961-53 Encounter: [id number]      Subjective:   Seen and examined at bedside, he is very weak, he has not been out of bed, PT is recommending short-term rehab  I encouraged the patient    Objective:     Vitals:   Vitals:    05/25/18 1056   BP: 120/80   Pulse: 94   Resp: 20   Temp: (!) 97 °F (36 1 °C)   SpO2: 96%     Body mass index is 22 97 kg/m²      Intake/Output Summary (Last 24 hours) at 05/25/18 1201  Last data filed at 05/25/18 0919   Gross per 24 hour   Intake             1600 ml   Output             3325 ml   Net            -1725 ml       Physical Exam:   /80 (BP Location: Right arm)   Pulse 94   Temp (!) 97 °F (36 1 °C) (Temporal)   Resp 20   Ht 5' 5" (1 651 m)   Wt 62 6 kg (138 lb 0 1 oz)   SpO2 96%   BMI 22 97 kg/m²   General appearance: alert and oriented, in no acute distress  Head: Normocephalic, without obvious abnormality, atraumatic  Lungs: clear to auscultation bilaterally  Heart: regular rate and rhythm, S1, S2 normal, no murmur, click, rub or gallop  Abdomen: soft, non-tender; bowel sounds normal; no masses,  no organomegaly  Extremities: area of erythema improving   Pulses: 2+ and symmetric  Neurologic: Grossly normal     Invasive Devices     Peripheral Intravenous Line            Peripheral IV 05/22/18 Left Antecubital 2 days    Peripheral IV 05/24/18 Right;Dorsal (posterior) Forearm less than 1 day                  Results from last 7 days  Lab Units 05/25/18  0435 05/24/18  0534 05/23/18  0517   WBC Thousand/uL 10 66* 7 69 6 48   HEMOGLOBIN g/dL 8 8* 8 7* 7 2*   HEMATOCRIT % 27 4* 26 6* 22 4*   PLATELETS Thousands/uL 229 215 189         Results from last 7 days  Lab Units 05/25/18  0435 05/24/18  0534 05/23/18  0517   SODIUM mmol/L 130* 132* 130*   POTASSIUM mmol/L 3 7 2 8* 3 8   CHLORIDE mmol/L 95* 98* 96*   CO2 mmol/L 29 26 24   BUN mg/dL 2* 2* 4*   CREATININE mg/dL 0 49* 0 47* 0 55*   CALCIUM mg/dL 7 1* 6 8* 7 3*   TOTAL PROTEIN g/dL 6 0* 5 9* 6 0*   BILIRUBIN TOTAL mg/dL 2 30* 2 10* 2 20*   ALK PHOS U/L 257* 294* 292*   ALT U/L 63 62 70   AST U/L 194* 173* 168*   GLUCOSE RANDOM mg/dL 95 106 105       Medication Administration - last 24 hours from 05/24/2018 1201 to 05/25/2018 1201       Date/Time Order Dose Route Action Action by     05/25/2018 0919 sodium chloride 0 9 % infusion 100 mL/hr Intravenous New Bag Matt Smith RN     05/25/2018 0915 sodium chloride 0 9 % infusion 0 mL/hr Intravenous Stopped Matt Smith RN     05/25/2018 3996 thiamine (VITAMIN B1) tablet 100 mg 100 mg Oral Given Matt Smith RN     05/25/2018 0523 ceFAZolin (ANCEF) IVPB (premix) 1,000 mg 1,000 mg Intravenous New Bag Kiko Jennings RN     05/24/2018 2140 ceFAZolin (ANCEF) IVPB (premix) 1,000 mg 1,000 mg Intravenous [address] Clyde Williamson RN     05/24/2018 7530 ceFAZolin (ANCEF) IVPB (premix) 1,000 mg 1,000 mg Intravenous [address] Gustavo Michel, [address] [place] [address]     05/25/2018 7891 cholecalciferol (VITAMIN D3) tablet 1,000 Units 1,000 Units Oral Given Noemi Mondragon RN     05/25/2018 6136 cyanocobalamin (VITAMIN B-12) tablet 100 mcg 100 mcg Oral Given Noemi Mondragon RN     68/45/2199 4901 folic acid (FOLVITE) tablet 1 mg 1 mg Oral Given Noemi Mondragon RN     05/25/2018 8081 gabapentin (NEURONTIN) capsule 300 mg 300 mg Oral Given Noemi Mondragon RN     05/24/2018 2006 gabapentin (NEURONTIN) capsule 300 mg 300 mg Oral Given Terrance Yates RN     05/24/2018 1644 gabapentin (NEURONTIN) capsule 300 mg 300 mg Oral Given Terrance Yates RN     05/25/2018 0128 lisinopril (ZESTRIL) tablet 10 mg 10 mg Oral Given Noemi Mondragon RN     05/25/2018 0926 nicotine (NICODERM CQ) 7 mg/24hr TD 24 hr patch 1 patch 1 patch Transdermal Medication Applied Noemi Mondragon RN     05/25/2018 0423 nicotine (NICODERM CQ) 7 mg/24hr TD 24 hr patch 1 patch 1 patch Transdermal Patch Removed Noemi Mondragon RN     05/24/2018 2140 enoxaparin (LOVENOX) subcutaneous injection 40 mg 40 mg Subcutaneous Given Terrance Yates RN     05/25/2018 6675 multivitamin-minerals (CENTRUM) tablet 1 tablet 1 tablet Oral Given Noemi Mondragon RN     05/25/2018 4160 potassium-sodium phosphateS (K-PHOS,PHOSPHA 250) -250 mg tablet 1 tablet 1 tablet Oral Given Noemi Mondragon RN     05/24/2018 2140 potassium-sodium phosphateS (K-PHOS,PHOSPHA 250) -250 mg tablet 1 tablet 1 tablet Oral Given Terrance Yates RN     05/24/2018 1644 potassium-sodium phosphateS (K-PHOS,PHOSPHA 250) -250 mg tablet 1 tablet 1 tablet Oral Given Terrance Yates RN     05/24/2018 1220 potassium-sodium phosphateS (K-PHOS,PHOSPHA 250) -250 mg tablet 1 tablet 1 tablet Oral Given Gustavo Michel RN     05/24/2018 1221 LORazepam (ATIVAN) 2 mg/mL injection 1 mg 1 mg Intravenous Not Given Gustavo Michel, RN     05/25/2018 1395 levETIRAcetam (KEPPRA) tablet 1,500 mg 1,500 mg Oral Given Noemi Mondragon RN     05/24/2018 2006 levETIRAcetam (KEPPRA) tablet 1,500 mg 1,500 mg Oral Given Veneciahammad Johnson, RN     05/25/2018 0730 potassium chloride (K-DUR,KLOR-CON) CR tablet 40 mEq 40 mEq Oral Not Given Annel Ricci, MARIANNE     05/24/2018 1644 potassium chloride (K-DUR,KLOR-CON) CR tablet 40 mEq 40 mEq Oral Given Navneet Johnson, RN     05/24/2018 1220 potassium chloride (K-DUR,KLOR-CON) CR tablet 40 mEq 40 mEq Oral Given Pereira Ebleona, RN     05/24/2018 1220 potassium phosphate 30 mmol in sodium chloride 0 9 % 250 mL infusion 30 mmol Intravenous [address] [facility], [address] [place] [address]     05/24/2018 1225 magnesium sulfate 2 g/50 mL IVPB (premix) 2 g 2 g Intravenous Not Given Pereira Ebbing, RN     05/24/2018 1220 LORazepam (ATIVAN) 2 mg/mL injection 0 5 mg 0 5 mg Intravenous Given Pereira Ebbing, RN     05/24/2018 2006 magnesium sulfate 2 g/50 mL IVPB (premix) 2 g 2 g Intravenous [address] [facility], [address] [place] [address]     05/25/2018 7295 magnesium sulfate 2 g/50 mL IVPB (premix) 2 g 2 g Intravenous New Bag Annel Ricci RN            Lab, Imaging and other studies: I have personally reviewed pertinent reports      VTE Pharmacologic Prophylaxis: Enoxaparin (Lovenox)  VTE Mechanical Prophylaxis: sequential compression device     Aisha Gary MD  5/25/2018,12:01 PM

## 2018-05-25 NOTE — SOCIAL WORK
I spoke with the patient and he is agreeable to a referral to hometown and he is also agreeable to the fifth floor    I also asked him if he will go to acute and he  is agreeable

## 2018-05-25 NOTE — ASSESSMENT & PLAN NOTE
Likely alcoholic hepatitis  Discussed with GI in  Continue to monitor LFTs  Total bili is  stable  AST slightly worse today

## 2018-05-25 NOTE — WOUND OSTOMY CARE
Patient seen at request of nursing staff for topical recommendations for right medial ankle ulcer  Plan:  1  Dressing change every other day  Cleanse NSS  Apply Xeroform gauze to base of ulcer and dried fibrinous exudate on periwound  Cover with gauze  Secure with ernesto  2   Hydroguard to sacrum and buttocks q shift  3   Consult podiatry if decline in lower extremity ulcers  4   Turn and reposition q 2 hours  5   Alternating air pump on [employer] air mattress  Gabriela Quezada RN   CWOCN  5/25/2018 11:02

## 2018-05-25 NOTE — TELEPHONE ENCOUNTER
----- Message from Starla Nieves PA-C sent at 5/25/2018  9:29 AM EDT -----  Please call him for a follow up visit after recent hospitalization for alcoholic hepatitis and anemia  Thanks!

## 2018-05-25 NOTE — SOCIAL WORK
Cm clarified with pt any hx of inpatient drug and alcohol or mental health dx  Pt states no to both  Pt was accepted at [facility] and pending insurance approval [facility] can take pt on Monday 5/28

## 2018-05-25 NOTE — OCCUPATIONAL THERAPY NOTE
Occupational Therapy Evaluation     Patient Name: Marissa Chin  GEDQB'G Date: 5/25/2018  Problem List  Patient Active Problem List   Diagnosis    [facility]    Tobacco use    Essential hypertension    COPD (chronic obstructive pulmonary disease) ([facility] [address] )    H/O suicide attempt    H/O cervical spine surgery    Cholelithiasis    Hyponatremia    Anemia    Hepatomegaly    Hepatic steatosis    Hypomagnesemia    Elevated alkaline phosphatase level    Transaminitis    Vitamin D deficiency    Iron deficiency    Generalized weakness    Body mass index (BMI) 21 0-21 9, adult    Malnutrition of moderate degree (HCC)    Seizure (HCC)    Continuous chronic alcoholism ([facility] [address] )    Incisional hernia    Hx of seizure disorder    Seizure-like activity ([facility] [address] )    Diarrhea    Cellulitis of right lower extremity     Past Medical History  Past Medical History:   Diagnosis Date    Alcohol abuse     Bowel obstruction ([facility] [address] )     Bowel perforation ([facility] [address] )     Cardiac disease     Continuous chronic alcoholism ([facility] [address] ) 10/5/2017    COPD (chronic obstructive pulmonary disease) ([facility] [address] )     History of shoulder surgery     Right shoulder    History of transfusion     Hx of cervical spine surgery     Hypertension     Incisional hernia 10/8/2017    MI, old     Mitral regurgitation     Psychiatric disorder     Seizures ([facility] [address] )      Past Surgical History  Past Surgical History:   Procedure Laterality Date    BACK SURGERY      ESOPHAGOGASTRODUODENOSCOPY N/A 11/28/2016    Procedure: ESOPHAGOGASTRODUODENOSCOPY (EGD); Surgeon: Shashank Burkett MD;  Location:  GI LAB;   Service:     LAPAROTOMY N/A 10/25/2016    Procedure: LAPAROTOMY EXPLORATORY;  Surgeon: Austin Duncan MD;  Location: [facility];  Service:    [address] Right     SMALL INTESTINE SURGERY             05/25/18 0906   Note Type   Note type Eval/Treat   Restrictions/Precautions   Weight Bearing Precautions Per Order No   Other Precautions Multiple lines;Telemetry; Fall Risk;Pain;Bed Alarm   Pain Assessment   Pain Assessment 0-10   Pain Score 9   Pain Location Leg   Pain Orientation Right   Home Living   Additional Comments please see PT evaluation   Prior Function   Level of [place] Needs assistance with ADLs and functional mobility; Needs assistance with IADLs   Lives With Family   Receives Help From Family   ADL Assistance Needs assistance   IADLs Needs assistance   Falls in the last 6 months 5 to 10   Comments pt's family drives or he uses the [employer] bus   Psychosocial   Psychosocial (WDL) WDL   Subjective   Subjective I feel like I am denise be on the floor soon   ADL   Where Assessed Other (Comment)  (bathroom)   LB Dressing Assistance 1  Total Assistance   LB Dressing Deficit Don/doff R sock; Don/doff L sock  (pt attempted at EOB; unable to complete )   Toileting Assistance  4  Minimal Assistance   Toileting Deficit Increased time to complete;Grab bar use;Perineal hygiene   Additional Comments pt required increased time to complete little hygiene after BM; pt performed at (S) level and required (A) to retrieve toilet paper   Bed Mobility   Supine to Sit 4  Minimal assistance   Additional items Assist x 1;Bedrails; Increased time required;Verbal cues;LE management   Sit to Supine 4  Minimal assistance   Additional items Assist x 1;Bedrails; Increased time required;Verbal cues;LE management   Transfers   Sit to Stand 4  Minimal assistance   Additional items Assist x 2;Bedrails; Increased time required;Verbal cues  (quad cane)   Stand to Sit 4  Minimal assistance   Additional items Assist x 2;Verbal cues  (quad cane )   Toilet transfer 3  Moderate assistance   Additional items Assist x 1; Increased time required;Verbal cues;Standard toilet  (quad cane )   Functional Mobility   Functional Mobility 4  Minimal assistance   Additional Comments x2; pt utilized quad cane in R UE and held onto the IV pole with L UE; RW offered, pt declined stating "we will just go slow"; pt very unsteady during FM and required x2 rest breaks in order to progress forward during FM; pt progressed at very slow pace and with 2 LOBs   Additional items (quad cane and IV pole )   Balance   Static Sitting Good   Dynamic Sitting Fair +   Static Standing Fair   Dynamic Standing Fair -   Ambulatory Fair -   Activity Tolerance   Activity Tolerance Patient limited by fatigue;Patient limited by pain   RUE Assessment   RUE Assessment X  (4-/5 grossly; WFL AROM)   LUE Assessment   LUE Assessment X  (4-/5 grossly; WFL AROM)   Hand Function   Gross Motor Coordination Functional   Fine Motor Coordination Functional   Sensation   Light Touch No apparent deficits   Sharp/Dull No apparent deficits   Cognition   Overall Cognitive Status WFL   Arousal/Participation Alert   Attention Within functional limits   Orientation Level Oriented X4   Memory Within functional limits   Following Commands Follows all commands and directions without difficulty   Assessment   Limitation Decreased ADL status; Decreased UE strength;Decreased Safe judgement during ADL;Decreased endurance;Decreased self-care trans;Decreased high-level ADLs   Assessment pt presents at evaluation performing at min-mod (A) x1-2 with unsafe FM with quad cane; recommend continued OT intervention and use of RW; recommend short term rehab on d/c as pt is unable to care for himself at home at (I) level requiring increased (A)    Goals   Short Term Goal  pt will perform LB dressing at (S) level    Long Term Goal #1 pt will perform FM c RW at (S) level with no LOB    Long Term Goal #2 pt will perform toilet transfers and littel hygiene at (S) level    Long Term Goal pt will perform UB/LB bathing and grooming at min (A) level    Plan   Treatment Interventions ADL retraining;Functional transfer training;UE strengthening/ROM; Endurance training;Patient/family training; Activityengagement   Goal Expiration Date 06/08/18   OT Frequency 3-5x/wk Recommendation   OT Discharge Recommendation Short Term Rehab   OT - OK to Discharge No   Barthel Index   Feeding 5   Bathing 0   Grooming Score 0   Dressing Score 5   Bladder Score 5   Bowels Score 10   Toilet Use Score 5   Transfers (Bed/Chair) Score 10   Mobility (Level Surface) Score 10   Stairs Score 0   Barthel Index Score 50     Pt will benefit from continued OT services in order to maximize (I) c ADL performance, FM c RW, and improve overall endurance/strength required to complete functional tasks in preparation for d/c  Pt left supine in bed at end of session; all needs within reach; all lines intact

## 2018-05-25 NOTE — ASSESSMENT & PLAN NOTE
Continue vitamin supplementation  Continue replete electrolytes  Continue IV fluids  P o  Ativan t i d  1 mg  IV Ativan p r n

## 2018-05-25 NOTE — SOCIAL WORK
Received a call from Sohail Fitzgerald at [employer] with authorization for pt to go to [facility]  Authorization number is [id number]  Cm notified Summer Day in admissions dept at [facility]  Plans are dc Monday 5/28 if pt is medically ready

## 2018-05-26 LAB
ALBUMIN SERPL BCP-MCNC: 2 G/DL (ref 3.5–5)
ALP SERPL-CCNC: 267 U/L (ref 46–116)
ALT SERPL W P-5'-P-CCNC: 60 U/L (ref 12–78)
ANION GAP SERPL CALCULATED.3IONS-SCNC: 5 MMOL/L (ref 4–13)
ANISOCYTOSIS BLD QL SMEAR: PRESENT
AST SERPL W P-5'-P-CCNC: 157 U/L (ref 5–45)
BASO STIPL BLD QL SMEAR: PRESENT
BASOPHILS # BLD MANUAL: 0 THOUSAND/UL (ref 0–0.1)
BASOPHILS NFR MAR MANUAL: 0 % (ref 0–1)
BILIRUB SERPL-MCNC: 2.6 MG/DL (ref 0.2–1)
BUN SERPL-MCNC: 4 MG/DL (ref 5–25)
CALCIUM SERPL-MCNC: 7 MG/DL (ref 8.3–10.1)
CHLORIDE SERPL-SCNC: 95 MMOL/L (ref 100–108)
CO2 SERPL-SCNC: 31 MMOL/L (ref 21–32)
CREAT SERPL-MCNC: 0.48 MG/DL (ref 0.6–1.3)
EOSINOPHIL # BLD MANUAL: 0 THOUSAND/UL (ref 0–0.4)
EOSINOPHIL NFR BLD MANUAL: 0 % (ref 0–6)
ERYTHROCYTE [DISTWIDTH] IN BLOOD BY AUTOMATED COUNT: 20.5 % (ref 11.6–15.1)
GFR SERPL CREATININE-BSD FRML MDRD: 128 ML/MIN/1.73SQ M
GLUCOSE SERPL-MCNC: 92 MG/DL (ref 65–140)
HCT VFR BLD AUTO: 30.5 % (ref 36.5–49.3)
HGB BLD-MCNC: 9.9 G/DL (ref 12–17)
HYPERCHROMIA BLD QL SMEAR: PRESENT
LYMPHOCYTES # BLD AUTO: 19 % (ref 14–44)
LYMPHOCYTES # BLD AUTO: 2.21 THOUSAND/UL (ref 0.6–4.47)
MAGNESIUM SERPL-MCNC: 1.4 MG/DL (ref 1.6–2.6)
MCH RBC QN AUTO: 30.1 PG (ref 26.8–34.3)
MCHC RBC AUTO-ENTMCNC: 32.5 G/DL (ref 31.4–37.4)
MCV RBC AUTO: 93 FL (ref 82–98)
MONOCYTES # BLD AUTO: 0.93 THOUSAND/UL (ref 0–1.22)
MONOCYTES NFR BLD: 8 % (ref 4–12)
NEUTROPHILS # BLD MANUAL: 8.5 THOUSAND/UL (ref 1.85–7.62)
NEUTS SEG NFR BLD AUTO: 73 % (ref 43–75)
PHOSPHATE SERPL-MCNC: 2.4 MG/DL (ref 2.7–4.5)
PLATELET # BLD AUTO: 230 THOUSANDS/UL (ref 149–390)
PLATELET BLD QL SMEAR: ADEQUATE
PMV BLD AUTO: 9.6 FL (ref 8.9–12.7)
POLYCHROMASIA BLD QL SMEAR: PRESENT
POTASSIUM SERPL-SCNC: 3.5 MMOL/L (ref 3.5–5.3)
PROT SERPL-MCNC: 6.5 G/DL (ref 6.4–8.2)
RBC # BLD AUTO: 3.29 MILLION/UL (ref 3.88–5.62)
SCHISTOCYTES BLD QL SMEAR: PRESENT
SODIUM SERPL-SCNC: 131 MMOL/L (ref 136–145)
TARGETS BLD QL SMEAR: PRESENT
TOTAL CELLS COUNTED SPEC: 100
WBC # BLD AUTO: 11.64 THOUSAND/UL (ref 4.31–10.16)

## 2018-05-26 PROCEDURE — 99232 SBSQ HOSP IP/OBS MODERATE 35: CPT | Performed by: FAMILY MEDICINE

## 2018-05-26 PROCEDURE — 83735 ASSAY OF MAGNESIUM: CPT | Performed by: FAMILY MEDICINE

## 2018-05-26 PROCEDURE — 85027 COMPLETE CBC AUTOMATED: CPT | Performed by: FAMILY MEDICINE

## 2018-05-26 PROCEDURE — 85007 BL SMEAR W/DIFF WBC COUNT: CPT | Performed by: FAMILY MEDICINE

## 2018-05-26 PROCEDURE — 80053 COMPREHEN METABOLIC PANEL: CPT | Performed by: FAMILY MEDICINE

## 2018-05-26 PROCEDURE — 84100 ASSAY OF PHOSPHORUS: CPT | Performed by: FAMILY MEDICINE

## 2018-05-26 RX ORDER — MAGNESIUM SULFATE HEPTAHYDRATE 40 MG/ML
2 INJECTION, SOLUTION INTRAVENOUS
Status: DISCONTINUED | OUTPATIENT
Start: 2018-05-26 | End: 2018-05-26

## 2018-05-26 RX ORDER — MAGNESIUM SULFATE HEPTAHYDRATE 40 MG/ML
2 INJECTION, SOLUTION INTRAVENOUS
Status: COMPLETED | OUTPATIENT
Start: 2018-05-26 | End: 2018-05-26

## 2018-05-26 RX ORDER — MAGNESIUM SULFATE HEPTAHYDRATE 40 MG/ML
4 INJECTION, SOLUTION INTRAVENOUS
Status: DISCONTINUED | OUTPATIENT
Start: 2018-05-26 | End: 2018-05-26

## 2018-05-26 RX ADMIN — MAGNESIUM SULFATE HEPTAHYDRATE 2 G: 40 INJECTION, SOLUTION INTRAVENOUS at 16:03

## 2018-05-26 RX ADMIN — CEFAZOLIN SODIUM 1000 MG: 1 SOLUTION INTRAVENOUS at 15:17

## 2018-05-26 RX ADMIN — LEVETIRACETAM 1500 MG: 500 TABLET ORAL at 21:32

## 2018-05-26 RX ADMIN — LISINOPRIL 10 MG: 10 TABLET ORAL at 08:42

## 2018-05-26 RX ADMIN — GABAPENTIN 300 MG: 300 CAPSULE ORAL at 21:32

## 2018-05-26 RX ADMIN — POTASSIUM PHOSPHATE, MONOBASIC AND POTASSIUM PHOSPHATE, DIBASIC 30 MMOL: 224; 236 INJECTION, SOLUTION INTRAVENOUS at 13:15

## 2018-05-26 RX ADMIN — LORAZEPAM 1 MG: 1 TABLET ORAL at 13:56

## 2018-05-26 RX ADMIN — LORAZEPAM 1 MG: 1 TABLET ORAL at 19:08

## 2018-05-26 RX ADMIN — DIBASIC SODIUM PHOSPHATE, MONOBASIC POTASSIUM PHOSPHATE AND MONOBASIC SODIUM PHOSPHATE 1 TABLET: 852; 155; 130 TABLET ORAL at 08:42

## 2018-05-26 RX ADMIN — LORAZEPAM 1 MG: 1 TABLET ORAL at 08:43

## 2018-05-26 RX ADMIN — THIAMINE HCL TAB 100 MG 100 MG: 100 TAB at 08:42

## 2018-05-26 RX ADMIN — OXYCODONE HYDROCHLORIDE 5 MG: 5 TABLET ORAL at 10:52

## 2018-05-26 RX ADMIN — LEVETIRACETAM 1500 MG: 500 TABLET ORAL at 08:42

## 2018-05-26 RX ADMIN — ENOXAPARIN SODIUM 40 MG: 40 INJECTION, SOLUTION INTRAVENOUS; SUBCUTANEOUS at 21:32

## 2018-05-26 RX ADMIN — MAGNESIUM SULFATE HEPTAHYDRATE 2 G: 40 INJECTION, SOLUTION INTRAVENOUS at 10:59

## 2018-05-26 RX ADMIN — MAGNESIUM SULFATE HEPTAHYDRATE 2 G: 40 INJECTION, SOLUTION INTRAVENOUS at 13:15

## 2018-05-26 RX ADMIN — FOLIC ACID 1 MG: 1 TABLET ORAL at 08:43

## 2018-05-26 RX ADMIN — GABAPENTIN 300 MG: 300 CAPSULE ORAL at 08:42

## 2018-05-26 RX ADMIN — DIBASIC SODIUM PHOSPHATE, MONOBASIC POTASSIUM PHOSPHATE AND MONOBASIC SODIUM PHOSPHATE 1 TABLET: 852; 155; 130 TABLET ORAL at 21:32

## 2018-05-26 RX ADMIN — SODIUM CHLORIDE 100 ML/HR: 0.9 INJECTION, SOLUTION INTRAVENOUS at 12:20

## 2018-05-26 RX ADMIN — SODIUM CHLORIDE 100 ML/HR: 0.9 INJECTION, SOLUTION INTRAVENOUS at 02:18

## 2018-05-26 RX ADMIN — CEFAZOLIN SODIUM 1000 MG: 1 SOLUTION INTRAVENOUS at 05:11

## 2018-05-26 RX ADMIN — GABAPENTIN 300 MG: 300 CAPSULE ORAL at 15:07

## 2018-05-26 RX ADMIN — CEFAZOLIN SODIUM 1000 MG: 1 SOLUTION INTRAVENOUS at 23:35

## 2018-05-26 RX ADMIN — NICOTINE 1 PATCH: 7 PATCH, EXTENDED RELEASE TRANSDERMAL at 08:43

## 2018-05-26 RX ADMIN — DIBASIC SODIUM PHOSPHATE, MONOBASIC POTASSIUM PHOSPHATE AND MONOBASIC SODIUM PHOSPHATE 1 TABLET: 852; 155; 130 TABLET ORAL at 11:41

## 2018-05-26 RX ADMIN — VITAMIN D, TAB 1000IU (100/BT) 1000 UNITS: 25 TAB at 08:42

## 2018-05-26 RX ADMIN — VITAM B12 100 MCG: 100 TAB at 08:42

## 2018-05-26 RX ADMIN — MULTIPLE VITAMINS W/ MINERALS TAB 1 TABLET: TAB at 08:42

## 2018-05-26 RX ADMIN — DIBASIC SODIUM PHOSPHATE, MONOBASIC POTASSIUM PHOSPHATE AND MONOBASIC SODIUM PHOSPHATE 1 TABLET: 852; 155; 130 TABLET ORAL at 16:03

## 2018-05-26 NOTE — PROGRESS NOTES
Progress Note - Marissa Chin 1965, 47 y o  male MRN: [id number]    Unit/Bed#: 152-78 Encounter: [id number]    Primary Care Provider: Simin Navarrete PA-C   Date and time admitted to hospital: 5/22/2018  3:08 PM        * Cellulitis of right lower extremity   Assessment & Plan    Continue Ancef  Wound culture sensitive to Ancef  Transition to Keflex and discharged        Anemia   Assessment & Plan    GI input appreciated he will require outpatient follow-up for EGD and colonoscopy  Hemoglobin stable status post 1 unit PRBCs        Seizure-like activity ([address] )   Assessment & Plan    CT head reviewed  Prolactin is top-normal  Transitioned to p o  Keppra 1500 mg p o  b i d  Transaminitis   Assessment & Plan    Likely alcoholic hepatitis  Discussed with GI in  Continue to monitor LFTs  Total bili is  stable  AST slightly worse today        Alcohol dependence (HCC)   Assessment & Plan    Continue vitamin supplementation  Continue replete electrolytes  Continue IV fluids  P o  Ativan t i d  1 mg  IV Ativan p r n  Progress Note - Marissa Chin 47 y o  male MRN: [id number]    Unit/Bed#: 767-83 Encounter: [id number]    Subjective:     Seen and examined @ bedside   No acute events or complaints overnight     Objective:     Vitals:   Vitals:    05/26/18 0722   BP: 140/92   Pulse: 94   Resp: 18   Temp: 97 9 °F (36 6 °C)   SpO2: 94%     Body mass index is 24 58 kg/m²      Intake/Output Summary (Last 24 hours) at 05/26/18 1103  Last data filed at 05/26/18 0851   Gross per 24 hour   Intake             1340 ml   Output             3450 ml   Net            -2110 ml       Physical Exam:   /92 (BP Location: Left arm)   Pulse 94   Temp 97 9 °F (36 6 °C) (Temporal)   Resp 18   Ht 5' 5" (1 651 m)   Wt 67 kg (147 lb 11 3 oz)   SpO2 94%   BMI 24 58 kg/m²   General appearance: alert and oriented, in no acute distress  Head: Normocephalic, without obvious abnormality, atraumatic  Lungs: clear to auscultation bilaterally  Heart: regular rate and rhythm, S1, S2 normal, no murmur, click, rub or gallop  Abdomen: soft, non-tender; bowel sounds normal; no masses,  no organomegaly  Extremities: area of erythema improving, minimal edema  Neurologic: Grossly normal     Invasive Devices     Peripheral Intravenous Line            Peripheral IV 05/22/18 Left Antecubital 3 days    Peripheral IV 05/24/18 Right;Dorsal (posterior) Forearm 1 day                  Results from last 7 days  Lab Units 05/26/18  0423 05/25/18  0435 05/24/18  0534   WBC Thousand/uL 11 64* 10 66* 7 69   HEMOGLOBIN g/dL 9 9* 8 8* 8 7*   HEMATOCRIT % 30 5* 27 4* 26 6*   PLATELETS Thousands/uL 230 229 215         Results from last 7 days  Lab Units 05/26/18  0423 05/25/18  0435 05/24/18  0534   SODIUM mmol/L 131* 130* 132*   POTASSIUM mmol/L 3 5 3 7 2 8*   CHLORIDE mmol/L 95* 95* 98*   CO2 mmol/L 31 29 26   BUN mg/dL 4* 2* 2*   CREATININE mg/dL 0 48* 0 49* 0 47*   CALCIUM mg/dL 7 0* 7 1* 6 8*   TOTAL PROTEIN g/dL 6 5 6 0* 5 9*   BILIRUBIN TOTAL mg/dL 2 60* 2 30* 2 10*   ALK PHOS U/L 267* 257* 294*   ALT U/L 60 63 62   AST U/L 157* 194* 173*   GLUCOSE RANDOM mg/dL 92 95 106       Medication Administration - last 24 hours from 05/25/2018 1103 to 05/26/2018 1103       Date/Time Order Dose Route Action Action by     05/26/2018 0218 sodium chloride 0 9 % infusion 100 mL/hr Intravenous [address] [address] MARIANNE Rico     05/26/2018 0215 sodium chloride 0 9 % infusion 0 mL/hr Intravenous Stopped Yareli Connolly RN     05/26/2018 0842 thiamine (VITAMIN B1) tablet 100 mg 100 mg Oral Given Hung Kaur RN     05/26/2018 0511 ceFAZolin (ANCEF) IVPB (premix) 1,000 mg 1,000 mg Intravenous [address] [place] [address] MARIANNE Rico     05/25/2018 2156 ceFAZolin (ANCEF) IVPB (premix) 1,000 mg 1,000 mg Intravenous [address] [place] [address], [address] [place] [address]     05/25/2018 1554 ceFAZolin (ANCEF) IVPB (premix) 1,000 mg 1,000 mg Intravenous [address] [place] [address], [address] [place] [address]     05/26/2018 4288 cholecalciferol (VITAMIN D3) tablet 1,000 Units 1,000 Units Oral Given [facility] June, RN     05/26/2018 2345 cyanocobalamin (VITAMIN B-12) tablet 100 mcg 100 mcg Oral Given [facility] Natasha, RN     07/57/1725 8908 folic acid (FOLVITE) tablet 1 mg 1 mg Oral Given [facility] Natasha, RN     05/26/2018 4984 gabapentin (NEURONTIN) capsule 300 mg 300 mg Oral Given [facility] June, RN     05/25/2018 2155 gabapentin (NEURONTIN) capsule 300 mg 300 mg Oral Given [facility]     05/25/2018 1550 gabapentin (NEURONTIN) capsule 300 mg 300 mg Oral Given [place] James, RN     05/26/2018 7035 lisinopril (ZESTRIL) tablet 10 mg 10 mg Oral Given [facility] June, RN     05/26/2018 0843 nicotine (NICODERM CQ) 7 mg/24hr TD 24 hr patch 1 patch 1 patch Transdermal Medication Applied [facility] June, RN     05/26/2018 0841 nicotine (NICODERM CQ) 7 mg/24hr TD 24 hr patch 1 patch 1 patch Transdermal Patch Removed [facility] June, RN     05/25/2018 2156 enoxaparin (LOVENOX) subcutaneous injection 40 mg 40 mg Subcutaneous Given Javan James, RN     05/26/2018 1766 multivitamin-minerals (CENTRUM) tablet 1 tablet 1 tablet Oral Given [facility] June, RN     05/26/2018 3943 potassium-sodium phosphateS (K-PHOS,PHOSPHA 250) -250 mg tablet 1 tablet 1 tablet Oral Given [facility] June, RN     05/25/2018 2155 potassium-sodium phosphateS (K-PHOS,PHOSPHA 250) -250 mg tablet 1 tablet 1 tablet Oral Given Javan James, RN     05/25/2018 1550 potassium-sodium phosphateS (K-PHOS,PHOSPHA 250) -250 mg tablet 1 tablet 1 tablet Oral Given [place] James, RN     05/25/2018 1256 potassium-sodium phosphateS (K-PHOS,PHOSPHA 250) -250 mg tablet 1 tablet 1 tablet Oral Given Lugengilma Contreras, RN     05/26/2018 0842 levETIRAcetam (KEPPRA) tablet 1,500 mg 1,500 mg Oral Given [facility] June, RN     05/25/2018 2155 levETIRAcetam (KEPPRA) tablet 1,500 mg 1,500 mg Oral Given Javan James RN     05/25/2018 2030 potassium phosphate 30 mmol in sodium chloride 0 9 % 250 mL infusion 0 mmol Intravenous Stopped Caden Purcell RN     05/25/2018 1416 potassium phosphate 30 mmol in sodium chloride 0 9 % 250 mL infusion 30 mmol Intravenous [address] Marta Bearden RN     05/25/2018 1256 magnesium sulfate 2 g/50 mL IVPB (premix) 2 g 2 g Intravenous New Bag Marta Bearden RN     05/26/2018 1052 oxyCODONE (ROXICODONE) IR tablet 5 mg 5 mg Oral Given Jasiel Díaz RN     05/25/2018 2334 oxyCODONE (ROXICODONE) IR tablet 5 mg 5 mg Oral Given Darinel Sanchez RN     05/26/2018 0843 LORazepam (ATIVAN) tablet 1 mg 1 mg Oral Given Jasiel Díaz RN     05/25/2018 2155 LORazepam (ATIVAN) tablet 1 mg 1 mg Oral Given Caden Purcell RN     05/25/2018 1417 LORazepam (ATIVAN) tablet 1 mg 1 mg Oral Given Marta Bearden RN     05/25/2018 1714 sodium chloride 0 9 % bolus 500 mL 0 mL Intravenous Stopped Anny North MARIANNE Lopez     05/25/2018 1609 sodium chloride 0 9 % bolus 500 mL 500 mL Intravenous [facility]     05/26/2018 1059 magnesium sulfate 2 g/50 mL IVPB (premix) 2 g 2 g Intravenous New Bag Jasiel Díaz RN            Lab, Imaging and other studies: I have personally reviewed pertinent reports      VTE Pharmacologic Prophylaxis: Enoxaparin (Lovenox)  VTE Mechanical Prophylaxis: sequential compression device     Sander Tan MD  5/26/2018,11:03 AM

## 2018-05-27 LAB
ANION GAP SERPL CALCULATED.3IONS-SCNC: 5 MMOL/L (ref 4–13)
BACTERIA BLD CULT: NORMAL
BACTERIA BLD CULT: NORMAL
BASOPHILS # BLD AUTO: 0.05 THOUSANDS/ΜL (ref 0–0.1)
BASOPHILS NFR BLD AUTO: 1 % (ref 0–1)
BUN SERPL-MCNC: 5 MG/DL (ref 5–25)
CALCIUM SERPL-MCNC: 7.2 MG/DL (ref 8.3–10.1)
CHLORIDE SERPL-SCNC: 96 MMOL/L (ref 100–108)
CO2 SERPL-SCNC: 28 MMOL/L (ref 21–32)
CREAT SERPL-MCNC: 0.61 MG/DL (ref 0.6–1.3)
EOSINOPHIL # BLD AUTO: 0.1 THOUSAND/ΜL (ref 0–0.61)
EOSINOPHIL NFR BLD AUTO: 1 % (ref 0–6)
ERYTHROCYTE [DISTWIDTH] IN BLOOD BY AUTOMATED COUNT: 19.9 % (ref 11.6–15.1)
GFR SERPL CREATININE-BSD FRML MDRD: 116 ML/MIN/1.73SQ M
GLUCOSE SERPL-MCNC: 137 MG/DL (ref 65–140)
HCT VFR BLD AUTO: 25.8 % (ref 36.5–49.3)
HGB BLD-MCNC: 8.3 G/DL (ref 12–17)
LYMPHOCYTES # BLD AUTO: 1.54 THOUSANDS/ΜL (ref 0.6–4.47)
LYMPHOCYTES NFR BLD AUTO: 15 % (ref 14–44)
MAGNESIUM SERPL-MCNC: 1.5 MG/DL (ref 1.6–2.6)
MCH RBC QN AUTO: 29.5 PG (ref 26.8–34.3)
MCHC RBC AUTO-ENTMCNC: 32.2 G/DL (ref 31.4–37.4)
MCV RBC AUTO: 92 FL (ref 82–98)
MONOCYTES # BLD AUTO: 1.3 THOUSAND/ΜL (ref 0.17–1.22)
MONOCYTES NFR BLD AUTO: 13 % (ref 4–12)
NEUTROPHILS # BLD AUTO: 7.13 THOUSANDS/ΜL (ref 1.85–7.62)
NEUTS SEG NFR BLD AUTO: 71 % (ref 43–75)
PHOSPHATE SERPL-MCNC: 2.4 MG/DL (ref 2.7–4.5)
PLATELET # BLD AUTO: 202 THOUSANDS/UL (ref 149–390)
PMV BLD AUTO: 10 FL (ref 8.9–12.7)
POTASSIUM SERPL-SCNC: 3.5 MMOL/L (ref 3.5–5.3)
RBC # BLD AUTO: 2.81 MILLION/UL (ref 3.88–5.62)
SODIUM SERPL-SCNC: 129 MMOL/L (ref 136–145)
WBC # BLD AUTO: 10.12 THOUSAND/UL (ref 4.31–10.16)

## 2018-05-27 PROCEDURE — 80048 BASIC METABOLIC PNL TOTAL CA: CPT | Performed by: FAMILY MEDICINE

## 2018-05-27 PROCEDURE — 83735 ASSAY OF MAGNESIUM: CPT | Performed by: FAMILY MEDICINE

## 2018-05-27 PROCEDURE — 84100 ASSAY OF PHOSPHORUS: CPT | Performed by: FAMILY MEDICINE

## 2018-05-27 PROCEDURE — 85025 COMPLETE CBC W/AUTO DIFF WBC: CPT | Performed by: FAMILY MEDICINE

## 2018-05-27 PROCEDURE — 99232 SBSQ HOSP IP/OBS MODERATE 35: CPT | Performed by: FAMILY MEDICINE

## 2018-05-27 RX ORDER — LORAZEPAM 0.5 MG/1
0.5 TABLET ORAL 3 TIMES DAILY
Status: DISCONTINUED | OUTPATIENT
Start: 2018-05-27 | End: 2018-06-06

## 2018-05-27 RX ORDER — MAGNESIUM SULFATE HEPTAHYDRATE 40 MG/ML
2 INJECTION, SOLUTION INTRAVENOUS
Status: COMPLETED | OUTPATIENT
Start: 2018-05-27 | End: 2018-05-27

## 2018-05-27 RX ADMIN — LEVETIRACETAM 1500 MG: 500 TABLET ORAL at 21:32

## 2018-05-27 RX ADMIN — LORAZEPAM 0.5 MG: 0.5 TABLET ORAL at 14:08

## 2018-05-27 RX ADMIN — DIBASIC SODIUM PHOSPHATE, MONOBASIC POTASSIUM PHOSPHATE AND MONOBASIC SODIUM PHOSPHATE 1 TABLET: 852; 155; 130 TABLET ORAL at 11:22

## 2018-05-27 RX ADMIN — GABAPENTIN 300 MG: 300 CAPSULE ORAL at 08:48

## 2018-05-27 RX ADMIN — Medication 400 MG: at 08:51

## 2018-05-27 RX ADMIN — CEFAZOLIN SODIUM 1000 MG: 1 SOLUTION INTRAVENOUS at 17:24

## 2018-05-27 RX ADMIN — CEFAZOLIN SODIUM 1000 MG: 1 SOLUTION INTRAVENOUS at 07:04

## 2018-05-27 RX ADMIN — DIBASIC SODIUM PHOSPHATE, MONOBASIC POTASSIUM PHOSPHATE AND MONOBASIC SODIUM PHOSPHATE 1 TABLET: 852; 155; 130 TABLET ORAL at 21:32

## 2018-05-27 RX ADMIN — Medication 400 MG: at 17:25

## 2018-05-27 RX ADMIN — ENOXAPARIN SODIUM 40 MG: 40 INJECTION, SOLUTION INTRAVENOUS; SUBCUTANEOUS at 21:32

## 2018-05-27 RX ADMIN — GABAPENTIN 300 MG: 300 CAPSULE ORAL at 21:32

## 2018-05-27 RX ADMIN — SODIUM CHLORIDE 100 ML/HR: 0.9 INJECTION, SOLUTION INTRAVENOUS at 05:19

## 2018-05-27 RX ADMIN — THIAMINE HCL TAB 100 MG 100 MG: 100 TAB at 08:47

## 2018-05-27 RX ADMIN — FOLIC ACID 1 MG: 1 TABLET ORAL at 08:48

## 2018-05-27 RX ADMIN — OXYCODONE HYDROCHLORIDE 5 MG: 5 TABLET ORAL at 13:11

## 2018-05-27 RX ADMIN — DIBASIC SODIUM PHOSPHATE, MONOBASIC POTASSIUM PHOSPHATE AND MONOBASIC SODIUM PHOSPHATE 1 TABLET: 852; 155; 130 TABLET ORAL at 17:25

## 2018-05-27 RX ADMIN — LORAZEPAM 1 MG: 1 TABLET ORAL at 08:48

## 2018-05-27 RX ADMIN — DIBASIC SODIUM PHOSPHATE, MONOBASIC POTASSIUM PHOSPHATE AND MONOBASIC SODIUM PHOSPHATE 1 TABLET: 852; 155; 130 TABLET ORAL at 07:04

## 2018-05-27 RX ADMIN — LORAZEPAM 0.5 MG: 0.5 TABLET ORAL at 20:23

## 2018-05-27 RX ADMIN — MAGNESIUM SULFATE HEPTAHYDRATE 2 G: 40 INJECTION, SOLUTION INTRAVENOUS at 08:51

## 2018-05-27 RX ADMIN — MAGNESIUM SULFATE HEPTAHYDRATE 2 G: 40 INJECTION, SOLUTION INTRAVENOUS at 11:22

## 2018-05-27 RX ADMIN — MAGNESIUM SULFATE HEPTAHYDRATE 2 G: 40 INJECTION, SOLUTION INTRAVENOUS at 13:33

## 2018-05-27 RX ADMIN — POTASSIUM PHOSPHATE, MONOBASIC AND POTASSIUM PHOSPHATE, DIBASIC 30 MMOL: 224; 236 INJECTION, SOLUTION INTRAVENOUS at 14:11

## 2018-05-27 RX ADMIN — VITAMIN D, TAB 1000IU (100/BT) 1000 UNITS: 25 TAB at 08:48

## 2018-05-27 RX ADMIN — VITAM B12 100 MCG: 100 TAB at 08:48

## 2018-05-27 RX ADMIN — GABAPENTIN 300 MG: 300 CAPSULE ORAL at 17:25

## 2018-05-27 RX ADMIN — MULTIPLE VITAMINS W/ MINERALS TAB 1 TABLET: TAB at 08:48

## 2018-05-27 RX ADMIN — NICOTINE 1 PATCH: 7 PATCH, EXTENDED RELEASE TRANSDERMAL at 08:49

## 2018-05-27 RX ADMIN — LEVETIRACETAM 1500 MG: 500 TABLET ORAL at 08:48

## 2018-05-27 NOTE — SOCIAL WORK
I received a call from [phone] stating that they cannot take the patient on Monday as the patient has a listed diagnosis of attempted suicide and they state that they have to have the patient optioned to come will address this issue on Tuesday as the patient has no hx of intentional attempt to harm himself I was [facility]

## 2018-05-27 NOTE — ASSESSMENT & PLAN NOTE
Continue vitamin supplementation  Continue replete electrolytes  DC IV fluids  Decreased p   O  Ativan 2 5 mg p o  t i d   IV Ativan p r n

## 2018-05-27 NOTE — ASSESSMENT & PLAN NOTE
Continue Ancef  Wound culture growing Staph that is sensitive to Ancef  Transition to Keflex on discharged

## 2018-05-27 NOTE — PROGRESS NOTES
Progress Note - Filomena Fitch 1965, 47 y o  male MRN: [id number]    Unit/Bed#: 349-07 Encounter: [id number]    Primary Care Provider: Dale Cruz PA-C   Date and time admitted to hospital: 5/22/2018  3:08 PM        * Cellulitis of right lower extremity   Assessment & Plan    Continue Ancef  Wound culture growing Staph that is sensitive to Ancef  Transition to Keflex on discharged        Anemia   Assessment & Plan    GI input appreciated he will require outpatient follow-up for EGD and colonoscopy  Hemoglobin stable status post 1 unit PRBCs        Seizure-like activity ([address] )   Assessment & Plan    CT head reviewed  Prolactin is top-normal  Transitioned to p o  Keppra 1500 mg p o  b i d  Hyponatremia   Assessment & Plan    Slightly worse today  Check BMP tomorrow          Alcohol dependence (HCC)   Assessment & Plan    Continue vitamin supplementation  Continue replete electrolytes  DC IV fluids  Decreased p   O  Ativan 2 5 mg p o  t i d   IV Ativan p r n  Progress Note - Filomena Fitch 47 y o  male MRN: [id number]    Unit/Bed#: 230-49 Encounter: [id number]        Subjective:   Seen and examined @ bedside  No acute complaints        Objective:     Vitals:   Vitals:    05/27/18 0740   BP: 102/60   Pulse: 84   Resp: 16   Temp: 97 6 °F (36 4 °C)   SpO2: 95%     Body mass index is 24 58 kg/m²      Intake/Output Summary (Last 24 hours) at 05/27/18 1014  Last data filed at 05/27/18 1001   Gross per 24 hour   Intake              600 ml   Output             1975 ml   Net            -1375 ml       Physical Exam:   /60 (BP Location: Left arm)   Pulse 84   Temp 97 6 °F (36 4 °C) (Temporal)   Resp 16   Ht 5' 5" (1 651 m)   Wt 67 kg (147 lb 11 3 oz)   SpO2 95%   BMI 24 58 kg/m²   General appearance: alert and oriented, in no acute distress  Head: Normocephalic, without obvious abnormality, atraumatic  Lungs: clear to auscultation bilaterally  Heart: regular rate and rhythm, S1, S2 normal, no murmur, click, rub or gallop  Abdomen: soft, non-tender; bowel sounds normal; no masses,  no organomegaly  Extremities: extremities normal, warm and well-perfused; no cyanosis, clubbing, or edema  Neurologic: Grossly normal     Invasive Devices     Peripheral Intravenous Line            Peripheral IV 05/24/18 Right;Dorsal (posterior) Forearm 2 days    Peripheral IV 05/26/18 Left;Medial Forearm less than 1 day                  Results from last 7 days  Lab Units 05/27/18  0530 05/26/18  0423 05/25/18  0435   WBC Thousand/uL 10 12 11 64* 10 66*   HEMOGLOBIN g/dL 8 3* 9 9* 8 8*   HEMATOCRIT % 25 8* 30 5* 27 4*   PLATELETS Thousands/uL 202 230 229         Results from last 7 days  Lab Units 05/27/18  0530 05/26/18  0423 05/25/18  0435 05/24/18  0534   SODIUM mmol/L 129* 131* 130* 132*   POTASSIUM mmol/L 3 5 3 5 3 7 2 8*   CHLORIDE mmol/L 96* 95* 95* 98*   CO2 mmol/L 28 31 29 26   BUN mg/dL 5 4* 2* 2*   CREATININE mg/dL 0 61 0 48* 0 49* 0 47*   CALCIUM mg/dL 7 2* 7 0* 7 1* 6 8*   TOTAL PROTEIN g/dL  --  6 5 6 0* 5 9*   BILIRUBIN TOTAL mg/dL  --  2 60* 2 30* 2 10*   ALK PHOS U/L  --  267* 257* 294*   ALT U/L  --  60 63 62   AST U/L  --  157* 194* 173*   GLUCOSE RANDOM mg/dL 137 92 95 106       Medication Administration - last 24 hours from 05/26/2018 1014 to 05/27/2018 1014       Date/Time Order Dose Route Action Action by     05/27/2018 0843 sodium chloride 0 9 % infusion 0 mL/hr Intravenous Stopped Precious Denson, RN     05/27/2018 0519 sodium chloride 0 9 % infusion 100 mL/hr Intravenous [address], [place]     05/27/2018 0518 sodium chloride 0 9 % infusion 0 mL/hr Intravenous Stopped Nick Lynn, MARIANNE     05/26/2018 1220 sodium chloride 0 9 % infusion 100 mL/hr Intravenous [address] [place], [facility]0 [facility]     05/27/2018 8716 thiamine (VITAMIN B1) tablet 100 mg 100 mg Oral Given Precious Denson, MARIANNE     05/27/2018 0704 ceFAZolin (ANCEF) IVPB (premix) 1,000 mg 1,000 mg Intravenous [address] [facility], 2450 [facility]     05/26/2018 8094 ceFAZolin (ANCEF) IVPB (premix) 1,000 mg 1,000 mg Intravenous [facility] Dennis Moss RN     05/26/2018 1517 ceFAZolin (ANCEF) IVPB (premix) 1,000 mg 1,000 mg Intravenous [facility] Jessica Meza, [facility]     05/27/2018 0848 cholecalciferol (VITAMIN D3) tablet 1,000 Units 1,000 Units Oral Given Jessica Meza RN     05/27/2018 0848 cyanocobalamin (VITAMIN B-12) tablet 100 mcg 100 mcg Oral Given Jessica Meza RN     97/87/9921 2479 folic acid (FOLVITE) tablet 1 mg 1 mg Oral Given Jessica Meza RN     05/27/2018 0848 gabapentin (NEURONTIN) capsule 300 mg 300 mg Oral Given Jessica Meza RN     05/26/2018 2132 gabapentin (NEURONTIN) capsule 300 mg 300 mg Oral Given Rocio Medel RN     05/26/2018 1507 gabapentin (NEURONTIN) capsule 300 mg 300 mg Oral Given Jessica Meza RN     05/27/2018 0849 lisinopril (ZESTRIL) tablet 10 mg 10 mg Oral Not Given Jessica Meza RN     05/27/2018 0849 nicotine (NICODERM CQ) 7 mg/24hr TD 24 hr patch 1 patch 1 patch Transdermal Medication Applied Jessica Meza RN     05/27/2018 0847 nicotine (NICODERM CQ) 7 mg/24hr TD 24 hr patch 1 patch 1 patch Transdermal Patch Removed Jessica Meza RN     05/26/2018 2132 enoxaparin (LOVENOX) subcutaneous injection 40 mg 40 mg Subcutaneous Given Rocio Medel RN     05/27/2018 0848 multivitamin-minerals (CENTRUM) tablet 1 tablet 1 tablet Oral Given Jessica Meza RN     05/27/2018 0704 potassium-sodium phosphateS (K-PHOS,PHOSPHA 250) -250 mg tablet 1 tablet 1 tablet Oral Given Jessica Meza RN     05/26/2018 2132 potassium-sodium phosphateS (K-PHOS,PHOSPHA 250) -250 mg tablet 1 tablet 1 tablet Oral Given Rocio Medel RN     05/26/2018 1603 potassium-sodium phosphateS (K-PHOS,PHOSPHA 250) -250 mg tablet 1 tablet 1 tablet Oral Given Jessica Meza RN     05/26/2018 1141 potassium-sodium phosphateS (K-PHOS,PHOSPHA 250) -250 mg tablet 1 tablet 1 tablet Oral Given Jessica Meza RN     05/27/2018 0848 levETIRAcetam (KEPPRA) tablet 1,500 mg 1,500 mg Oral Given Laymon Fabry, RN     05/26/2018 2132 levETIRAcetam (KEPPRA) tablet 1,500 mg 1,500 mg Oral Given Wilmar Ledbetter RN     05/26/2018 1052 oxyCODONE (ROXICODONE) IR tablet 5 mg 5 mg Oral Given Laymon Fabry, RN     05/27/2018 0848 LORazepam (ATIVAN) tablet 1 mg 1 mg Oral Given Laymon Fabry, RN     05/26/2018 1908 LORazepam (ATIVAN) tablet 1 mg 1 mg Oral Given Wilmar Ledbetter RN     05/26/2018 1356 LORazepam (ATIVAN) tablet 1 mg 1 mg Oral Given Laymon Fabry, RN     05/26/2018 1315 potassium phosphate 30 mmol in sodium chloride 0 9 % 250 mL infusion 30 mmol Intravenous [address] [facility], [place]     05/26/2018 1315 magnesium sulfate 2 g/50 mL IVPB (premix) 2 g 2 g Intravenous [address] Laymon Fabry, RN     05/26/2018 1059 magnesium sulfate 2 g/50 mL IVPB (premix) 2 g 2 g Intravenous [address] Laymon Fabry, [place]     05/26/2018 1603 magnesium sulfate 2 g/50 mL IVPB (premix) 2 g 2 g Intravenous [address]     05/27/2018 6300 magnesium sulfate 2 g/50 mL IVPB (premix) 2 g 2 g Intravenous [address]     05/27/2018 1959 magnesium oxide (MAG-OX) tablet 400 mg 400 mg Oral Given Laymon Fabry, RN            Lab, Imaging and other studies: I have personally reviewed pertinent reports      VTE Pharmacologic Prophylaxis: none 2/2 anemia  VTE Mechanical Prophylaxis: sequential compression device     Xuan Norton MD  5/27/2018,10:14 AM

## 2018-05-28 LAB
ALBUMIN SERPL BCP-MCNC: 1.8 G/DL (ref 3.5–5)
ALP SERPL-CCNC: 272 U/L (ref 46–116)
ALT SERPL W P-5'-P-CCNC: 45 U/L (ref 12–78)
ANION GAP SERPL CALCULATED.3IONS-SCNC: 5 MMOL/L (ref 4–13)
AST SERPL W P-5'-P-CCNC: 127 U/L (ref 5–45)
BASOPHILS # BLD AUTO: 0.06 THOUSANDS/ΜL (ref 0–0.1)
BASOPHILS NFR BLD AUTO: 1 % (ref 0–1)
BILIRUB SERPL-MCNC: 2.6 MG/DL (ref 0.2–1)
BUN SERPL-MCNC: 4 MG/DL (ref 5–25)
CALCIUM SERPL-MCNC: 7.7 MG/DL (ref 8.3–10.1)
CHLORIDE SERPL-SCNC: 95 MMOL/L (ref 100–108)
CO2 SERPL-SCNC: 28 MMOL/L (ref 21–32)
CREAT SERPL-MCNC: 0.54 MG/DL (ref 0.6–1.3)
EOSINOPHIL # BLD AUTO: 0.1 THOUSAND/ΜL (ref 0–0.61)
EOSINOPHIL NFR BLD AUTO: 1 % (ref 0–6)
ERYTHROCYTE [DISTWIDTH] IN BLOOD BY AUTOMATED COUNT: 20.1 % (ref 11.6–15.1)
GFR SERPL CREATININE-BSD FRML MDRD: 122 ML/MIN/1.73SQ M
GLUCOSE SERPL-MCNC: 94 MG/DL (ref 65–140)
HCT VFR BLD AUTO: 25.8 % (ref 36.5–49.3)
HGB BLD-MCNC: 8.5 G/DL (ref 12–17)
LYMPHOCYTES # BLD AUTO: 1.99 THOUSANDS/ΜL (ref 0.6–4.47)
LYMPHOCYTES NFR BLD AUTO: 17 % (ref 14–44)
MAGNESIUM SERPL-MCNC: 1.4 MG/DL (ref 1.6–2.6)
MCH RBC QN AUTO: 30.2 PG (ref 26.8–34.3)
MCHC RBC AUTO-ENTMCNC: 32.9 G/DL (ref 31.4–37.4)
MCV RBC AUTO: 92 FL (ref 82–98)
MONOCYTES # BLD AUTO: 1.81 THOUSAND/ΜL (ref 0.17–1.22)
MONOCYTES NFR BLD AUTO: 16 % (ref 4–12)
NEUTROPHILS # BLD AUTO: 7.61 THOUSANDS/ΜL (ref 1.85–7.62)
NEUTS SEG NFR BLD AUTO: 66 % (ref 43–75)
PHOSPHATE SERPL-MCNC: 3 MG/DL (ref 2.7–4.5)
PLATELET # BLD AUTO: 224 THOUSANDS/UL (ref 149–390)
PMV BLD AUTO: 10.4 FL (ref 8.9–12.7)
POTASSIUM SERPL-SCNC: 3.9 MMOL/L (ref 3.5–5.3)
PROT SERPL-MCNC: 6.3 G/DL (ref 6.4–8.2)
RBC # BLD AUTO: 2.81 MILLION/UL (ref 3.88–5.62)
SODIUM SERPL-SCNC: 128 MMOL/L (ref 136–145)
WBC # BLD AUTO: 11.57 THOUSAND/UL (ref 4.31–10.16)

## 2018-05-28 PROCEDURE — 85025 COMPLETE CBC W/AUTO DIFF WBC: CPT | Performed by: FAMILY MEDICINE

## 2018-05-28 PROCEDURE — 99232 SBSQ HOSP IP/OBS MODERATE 35: CPT | Performed by: FAMILY MEDICINE

## 2018-05-28 PROCEDURE — 83735 ASSAY OF MAGNESIUM: CPT | Performed by: FAMILY MEDICINE

## 2018-05-28 PROCEDURE — 80053 COMPREHEN METABOLIC PANEL: CPT | Performed by: FAMILY MEDICINE

## 2018-05-28 PROCEDURE — 84100 ASSAY OF PHOSPHORUS: CPT | Performed by: FAMILY MEDICINE

## 2018-05-28 RX ORDER — MAGNESIUM SULFATE HEPTAHYDRATE 40 MG/ML
2 INJECTION, SOLUTION INTRAVENOUS
Status: COMPLETED | OUTPATIENT
Start: 2018-05-28 | End: 2018-05-28

## 2018-05-28 RX ADMIN — Medication 800 MG: at 17:21

## 2018-05-28 RX ADMIN — THIAMINE HCL TAB 100 MG 100 MG: 100 TAB at 09:18

## 2018-05-28 RX ADMIN — LORAZEPAM 0.5 MG: 0.5 TABLET ORAL at 14:32

## 2018-05-28 RX ADMIN — CEFAZOLIN SODIUM 1000 MG: 1 SOLUTION INTRAVENOUS at 18:24

## 2018-05-28 RX ADMIN — VITAM B12 100 MCG: 100 TAB at 09:17

## 2018-05-28 RX ADMIN — GABAPENTIN 300 MG: 300 CAPSULE ORAL at 17:21

## 2018-05-28 RX ADMIN — LEVETIRACETAM 1500 MG: 500 TABLET ORAL at 09:18

## 2018-05-28 RX ADMIN — CEFAZOLIN SODIUM 1000 MG: 1 SOLUTION INTRAVENOUS at 01:38

## 2018-05-28 RX ADMIN — ENOXAPARIN SODIUM 40 MG: 40 INJECTION, SOLUTION INTRAVENOUS; SUBCUTANEOUS at 22:04

## 2018-05-28 RX ADMIN — MAGNESIUM SULFATE HEPTAHYDRATE 2 G: 40 INJECTION, SOLUTION INTRAVENOUS at 13:33

## 2018-05-28 RX ADMIN — Medication 800 MG: at 09:17

## 2018-05-28 RX ADMIN — LEVETIRACETAM 1500 MG: 500 TABLET ORAL at 22:04

## 2018-05-28 RX ADMIN — DIBASIC SODIUM PHOSPHATE, MONOBASIC POTASSIUM PHOSPHATE AND MONOBASIC SODIUM PHOSPHATE 1 TABLET: 852; 155; 130 TABLET ORAL at 12:45

## 2018-05-28 RX ADMIN — LISINOPRIL 10 MG: 10 TABLET ORAL at 09:16

## 2018-05-28 RX ADMIN — MULTIPLE VITAMINS W/ MINERALS TAB 1 TABLET: TAB at 09:17

## 2018-05-28 RX ADMIN — FOLIC ACID 1 MG: 1 TABLET ORAL at 09:16

## 2018-05-28 RX ADMIN — DIBASIC SODIUM PHOSPHATE, MONOBASIC POTASSIUM PHOSPHATE AND MONOBASIC SODIUM PHOSPHATE 1 TABLET: 852; 155; 130 TABLET ORAL at 22:04

## 2018-05-28 RX ADMIN — DIBASIC SODIUM PHOSPHATE, MONOBASIC POTASSIUM PHOSPHATE AND MONOBASIC SODIUM PHOSPHATE 1 TABLET: 852; 155; 130 TABLET ORAL at 09:17

## 2018-05-28 RX ADMIN — GABAPENTIN 300 MG: 300 CAPSULE ORAL at 09:18

## 2018-05-28 RX ADMIN — GABAPENTIN 300 MG: 300 CAPSULE ORAL at 22:04

## 2018-05-28 RX ADMIN — CEFAZOLIN SODIUM 1000 MG: 1 SOLUTION INTRAVENOUS at 09:21

## 2018-05-28 RX ADMIN — LORAZEPAM 0.5 MG: 0.5 TABLET ORAL at 22:04

## 2018-05-28 RX ADMIN — NICOTINE 1 PATCH: 7 PATCH, EXTENDED RELEASE TRANSDERMAL at 09:19

## 2018-05-28 RX ADMIN — DIBASIC SODIUM PHOSPHATE, MONOBASIC POTASSIUM PHOSPHATE AND MONOBASIC SODIUM PHOSPHATE 1 TABLET: 852; 155; 130 TABLET ORAL at 17:21

## 2018-05-28 RX ADMIN — LORAZEPAM 0.5 MG: 0.5 TABLET ORAL at 09:16

## 2018-05-28 RX ADMIN — VITAMIN D, TAB 1000IU (100/BT) 1000 UNITS: 25 TAB at 09:16

## 2018-05-28 RX ADMIN — MAGNESIUM SULFATE HEPTAHYDRATE 2 G: 40 INJECTION, SOLUTION INTRAVENOUS at 15:50

## 2018-05-28 RX ADMIN — MAGNESIUM SULFATE HEPTAHYDRATE 2 G: 40 INJECTION, SOLUTION INTRAVENOUS at 11:21

## 2018-05-28 NOTE — PLAN OF CARE
Problem: Potential for Falls  Goal: Patient will remain free of falls  INTERVENTIONS:  - Assess patient frequently for physical needs  -  Identify cognitive and physical deficits and behaviors that affect risk of falls    -  [place] fall precautions as indicated by assessment   - Educate patient/family on patient safety including physical limitations  - Instruct patient to call for assistance with activity based on assessment  - Modify environment to reduce risk of injury  - Consider OT/PT consult to assist with strengthening/mobility   Outcome: Progressing      Problem: PAIN - ADULT  Goal: Verbalizes/displays adequate comfort level or baseline comfort level  Interventions:  - Encourage patient to monitor pain and request assistance  - Assess pain using appropriate pain scale  - Administer analgesics based on type and severity of pain and evaluate response  - Implement non-pharmacological measures as appropriate and evaluate response  - Consider cultural and social influences on pain and pain management  - Notify physician/advanced practitioner if interventions unsuccessful or patient reports new pain   Outcome: Progressing      Problem: INFECTION - ADULT  Goal: Absence or prevention of progression during hospitalization  INTERVENTIONS:  - Assess and monitor for signs and symptoms of infection  - Monitor lab/diagnostic results    - [place] appropriate cooling/warming therapies per order  - Administer medications as ordered  - Instruct and encourage patient and family to use good hand hygiene technique  - Identify and instruct in appropriate isolation precautions for identified infection/condition   Outcome: Progressing      Problem: SAFETY ADULT  Goal: Maintain or return to baseline ADL function  INTERVENTIONS:  -  Assess patient's ability to carry out ADLs; assess patient's baseline for ADL function and identify physical deficits which impact ability to perform ADLs (bathing, care of mouth/teeth, toileting, grooming, dressing, etc )  - Assess/evaluate cause of self-care deficits   - Assess range of motion  - Assess patient's mobility; develop plan if impaired  - Assess patient's need for assistive devices and provide as appropriate  - Encourage maximum independence but intervene and supervise when necessary  ¯ Involve family in performance of ADLs  ¯ Assess for home care needs following discharge   ¯ Request OT consult to assist with ADL evaluation and planning for discharge  ¯ Provide patient education as appropriate   Outcome: Progressing    Goal: Maintain or return mobility status to optimal level  INTERVENTIONS:  - Assess patient's baseline mobility status (ambulation, transfers, stairs, etc )    - Identify cognitive and physical deficits and behaviors that affect mobility  - Identify mobility aids required to assist with transfers and/or ambulation (gait belt, sit-to-stand, lift, walker, cane, etc )  - [place] fall precautions as indicated by assessment  - Record patient progress and toleration of activity level on Mobility SBAR; progress patient to next Phase/Stage  - Instruct patient to call for assistance with activity based on assessment  - Request Rehabilitation consult to assist with strengthening/weightbearing, etc    Outcome: Progressing      Problem: DISCHARGE PLANNING  Goal: Discharge to home or other facility with appropriate resources  INTERVENTIONS:  - Identify barriers to discharge w/patient and caregiver  - Arrange for needed discharge resources and transportation as appropriate  - Identify discharge learning needs (meds, wound care, etc )  - Arrange for interpretive services to assist at discharge as needed  - Refer to Case Management Department for coordinating discharge planning if the patient needs post-hospital services based on physician/advanced practitioner order or complex needs related to functional status, cognitive ability, or social support system   Outcome: Progressing      Problem: Knowledge Deficit  Goal: Patient/family/caregiver demonstrates understanding of disease process, treatment plan, medications, and discharge instructions  Complete learning assessment and assess knowledge base    Interventions:  - Provide teaching at level of understanding  - Provide teaching via preferred learning methods   Outcome: Progressing      Problem: Prexisting or High Potential for Compromised Skin Integrity  Goal: Skin integrity is maintained or improved  INTERVENTIONS:  - Identify patients at risk for skin breakdown  - Assess and monitor skin integrity  - Assess and monitor nutrition and hydration status  - Monitor labs (i e  albumin)  - Assess for incontinence   - Turn and reposition patient  - Assist with mobility/ambulation  - Relieve pressure over bony prominences  - Avoid friction and shearing  - Provide appropriate hygiene as needed including keeping skin clean and dry  - Evaluate need for skin moisturizer/barrier cream  - Collaborate with interdisciplinary team (i e  Nutrition, Rehabilitation, etc )   - Patient/family teaching   Outcome: Progressing      Problem: DISCHARGE PLANNING - CARE MANAGEMENT  Goal: Discharge to post-acute care or home with appropriate resources  INTERVENTIONS:  - Conduct assessment to determine patient/family and health care team treatment goals, and need for post-acute services based on payer coverage, community resources, and patient preferences, and barriers to discharge  - Address psychosocial, clinical, and financial barriers to discharge as identified in assessment in conjunction with the patient/family and health care team  - Arrange appropriate level of post-acute services according to patient's   needs and preference and payer coverage in collaboration with the physician and health care team  - Communicate with and update the patient/family, physician, and health care team regarding progress on the discharge plan  - Arrange appropriate transportation to post-acute venues   Outcome: Progressing      Problem: Nutrition/Hydration-ADULT  Goal: Nutrient/Hydration intake appropriate for improving, restoring or maintaining nutritional needs  Monitor and assess patient's nutrition/hydration status for malnutrition (ex- brittle hair, bruises, dry skin, pale skin and conjunctiva, muscle wasting, smooth red tongue, and disorientation)  Collaborate with interdisciplinary team and initiate plan and interventions as ordered  Monitor patient's weight and dietary intake as ordered or per policy  Utilize nutrition screening tool and intervene per policy  Determine patient's food preferences and provide high-protein, high-caloric foods as appropriate       INTERVENTIONS:  - Monitor oral intake, urinary output, labs, and treatment plans  - Assess nutrition and hydration status and recommend course of action  - Evaluate amount of meals eaten  - Assist patient with eating if necessary   - Allow adequate time for meals  - Recommend/ encourage appropriate diets, oral nutritional supplements, and vitamin/mineral supplements  - Order, calculate, and assess calorie counts as needed  - Recommend, monitor, and adjust tube feedings and TPN/PPN based on assessed needs  - Assess need for intravenous fluids  - Provide specific nutrition/hydration education as appropriate  - Include patient/family/caregiver in decisions related to nutrition   Outcome: Progressing

## 2018-05-28 NOTE — ASSESSMENT & PLAN NOTE
Likely alcoholic hepatitis  Discussed with GI in  Continue to monitor LFTs  Total bili is  stable  LFT stable

## 2018-05-28 NOTE — PROGRESS NOTES
Progress Note - [facility] 1965, 47 y o  male MRN: [id number]    Unit/Bed#: 086-44 Encounter: [id number]    Primary Care Provider: Miguelito Wynne PA-C   Date and time admitted to hospital: 5/22/2018  3:08 PM        * Cellulitis of right lower extremity   Assessment & Plan    Continue Ancef  Wound culture growing Staph that is sensitive to Ancef  Transition to Keflex on discharged        Anemia   Assessment & Plan    GI input appreciated he will require outpatient follow-up for EGD and colonoscopy  Hemoglobin stable status post 1 unit PRBCs        Seizure-like activity ([address] )   Assessment & Plan    CT head reviewed  Prolactin is top-normal  Transitioned to p o  Keppra 1500 mg p o  b i d  Transaminitis   Assessment & Plan    Likely alcoholic hepatitis  Discussed with GI in  Continue to monitor LFTs  Total bili is  stable  LFT stable         Hyponatremia   Assessment & Plan    Slightly worse today  Fluid restrict to 1800cc / day           Alcohol dependence (HCC)   Assessment & Plan    Continue vitamin supplementation  Continue replete electrolytes  DC IV fluids  Decreased p   O  Ativan 2 5 mg p o  t i d   IV Ativan p r n  Progress Note - [facility] 47 y o  male MRN: [id number]    Unit/Bed#: 170-06 Encounter: [id number]        Subjective:   Seen and examined @ bedside  He's been working with PT/OT and has been out of bed    Objective:     Vitals:   Vitals:    05/28/18 0755   BP: 110/65   Pulse: 95   Resp: 18   Temp: 98 7 °F (37 1 °C)   SpO2: 94%     Body mass index is 25 2 kg/m²      Intake/Output Summary (Last 24 hours) at 05/28/18 0840  Last data filed at 05/28/18 0756   Gross per 24 hour   Intake              600 ml   Output             2525 ml   Net            -1925 ml       Physical Exam:   /65 (BP Location: Left arm)   Pulse 95   Temp 98 7 °F (37 1 °C) (Temporal)   Resp 18   Ht 5' 5" (1 651 m)   Wt 68 7 kg (151 lb 7 3 oz)   SpO2 94%   BMI 25 20 kg/m²   General appearance: alert and oriented, in no acute distress  Head: Normocephalic, without obvious abnormality, atraumatic  Lungs: clear to auscultation bilaterally  Heart: regular rate and rhythm, S1, S2 normal, no murmur, click, rub or gallop  Abdomen: soft, non-tender; bowel sounds normal; no masses,  no organomegaly  Extremities: extremities normal, warm and well-perfused; no cyanosis, clubbing, or edema  Pulses: 2+ and symmetric  Neurologic: Grossly normal     Invasive Devices     Peripheral Intravenous Line            Peripheral IV 05/24/18 Right;Dorsal (posterior) Forearm 3 days    Peripheral IV 05/26/18 Left;Medial Forearm 1 day                  Results from last 7 days  Lab Units 05/28/18  0510 05/27/18  0530 05/26/18  0423   WBC Thousand/uL 11 57* 10 12 11 64*   HEMOGLOBIN g/dL 8 5* 8 3* 9 9*   HEMATOCRIT % 25 8* 25 8* 30 5*   PLATELETS Thousands/uL 224 202 230         Results from last 7 days  Lab Units 05/28/18  0509 05/27/18  0530 05/26/18  0423 05/25/18  0435   SODIUM mmol/L 128* 129* 131* 130*   POTASSIUM mmol/L 3 9 3 5 3 5 3 7   CHLORIDE mmol/L 95* 96* 95* 95*   CO2 mmol/L 28 28 31 29   BUN mg/dL 4* 5 4* 2*   CREATININE mg/dL 0 54* 0 61 0 48* 0 49*   CALCIUM mg/dL 7 7* 7 2* 7 0* 7 1*   TOTAL PROTEIN g/dL 6 3*  --  6 5 6 0*   BILIRUBIN TOTAL mg/dL 2 60*  --  2 60* 2 30*   ALK PHOS U/L 272*  --  267* 257*   ALT U/L 45  --  60 63   AST U/L 127*  --  157* 194*   GLUCOSE RANDOM mg/dL 94 137 92 95       Medication Administration - last 24 hours from 05/27/2018 0840 to 05/28/2018 0840       Date/Time Order Dose Route Action Action by     05/27/2018 0843 sodium chloride 0 9 % infusion 0 mL/hr Intravenous Stopped Jessica Meza RN     05/27/2018 0847 thiamine (VITAMIN B1) tablet 100 mg 100 mg Oral Given Jessica Meza RN     05/28/2018 0138 ceFAZolin (ANCEF) IVPB (premix) 1,000 mg 1,000 mg Intravenous [address] Dennis Moss RN     05/27/2018 3084 ceFAZolin (ANCEF) IVPB (premix) 1,000 mg 1,000 mg Intravenous [place] Rui Pickard RN     05/27/2018 1556 ceFAZolin (ANCEF) IVPB (premix) 1,000 mg   Intravenous Canceled Entry Leonardo Martínez     05/27/2018 0848 cholecalciferol (VITAMIN D3) tablet 1,000 Units 1,000 Units Oral Given Preston Jeffery RN     05/27/2018 0848 cyanocobalamin (VITAMIN B-12) tablet 100 mcg 100 mcg Oral Given Preston Jeffery RN     65/46/5636 5753 folic acid (FOLVITE) tablet 1 mg 1 mg Oral Given Presotn Jeffery RN     05/27/2018 2132 gabapentin (NEURONTIN) capsule 300 mg 300 mg Oral Given Edmond Whitmore RN     05/27/2018 1725 gabapentin (NEURONTIN) capsule 300 mg 300 mg Oral Given Rui Pickard RN     05/27/2018 0848 gabapentin (NEURONTIN) capsule 300 mg 300 mg Oral Given Preston Jeffery RN     05/27/2018 0849 lisinopril (ZESTRIL) tablet 10 mg 10 mg Oral Not Given Preston Jeffery RN     05/27/2018 0849 nicotine (NICODERM CQ) 7 mg/24hr TD 24 hr patch 1 patch 1 patch Transdermal Medication Applied Preston Jeffery RN     05/27/2018 0847 nicotine (NICODERM CQ) 7 mg/24hr TD 24 hr patch 1 patch 1 patch Transdermal Patch Removed Preston Jeffery RN     05/27/2018 2132 enoxaparin (LOVENOX) subcutaneous injection 40 mg 40 mg Subcutaneous Given Edmond Whitmore RN     05/27/2018 0848 multivitamin-minerals (CENTRUM) tablet 1 tablet 1 tablet Oral Given Preston Jeffery RN     05/27/2018 2132 potassium-sodium phosphateS (K-PHOS,PHOSPHA 250) -250 mg tablet 1 tablet 1 tablet Oral Given Edmond Whitmore RN     05/27/2018 1725 potassium-sodium phosphateS (K-PHOS,PHOSPHA 250) -250 mg tablet 1 tablet 1 tablet Oral Given Rui Pickard RN     05/27/2018 1122 potassium-sodium phosphateS (K-PHOS,PHOSPHA 250) -250 mg tablet 1 tablet 1 tablet Oral Given Preston Jeffery RN     05/27/2018 2132 levETIRAcetam (KEPPRA) tablet 1,500 mg 1,500 mg Oral Given Edmond Whitmore RN     05/27/2018 0848 levETIRAcetam (KEPPRA) tablet 1,500 mg 1,500 mg Oral Given Preston Jeffery RN     05/27/2018 1311 oxyCODONE (ROXICODONE) IR tablet 5 mg 5 mg Oral Given Zaynab Grimaldo RN     05/27/2018 0848 LORazepam (ATIVAN) tablet 1 mg 1 mg Oral Given Zaynab Grimaldo RN     05/27/2018 1411 potassium phosphate 30 mmol in sodium chloride 0 9 % 250 mL infusion 30 mmol Intravenous [address] [facility], [address] [place] [address]     05/27/2018 1333 magnesium sulfate 2 g/50 mL IVPB (premix) 2 g 2 g Intravenous [address] [facility], [address] [place] [address]     05/27/2018 1122 magnesium sulfate 2 g/50 mL IVPB (premix) 2 g 2 g Intravenous [address] [facility], [address] [place] [address]     05/27/2018 5833 magnesium sulfate 2 g/50 mL IVPB (premix) 2 g 2 g Intravenous [address] [facility], [address] [place] [address]     05/27/2018 1725 magnesium oxide (MAG-OX) tablet 400 mg 400 mg Oral Given Arslan Philip RN     05/27/2018 0851 magnesium oxide (MAG-OX) tablet 400 mg 400 mg Oral Given Zaynab Grimaldo RN     05/27/2018 2023 LORazepam (ATIVAN) tablet 0 5 mg 0 5 mg Oral Given Ba Hernandez RN     05/27/2018 1408 LORazepam (ATIVAN) tablet 0 5 mg 0 5 mg Oral Given Zaynab Grimaldo RN            Lab, Imaging and other studies: I have personally reviewed pertinent reports      VTE Pharmacologic Prophylaxis: Enoxaparin (Lovenox)  VTE Mechanical Prophylaxis: sequential compression device     Trini Duron MD  5/28/2018,8:40 AM

## 2018-05-29 PROBLEM — D52.0 DIETARY FOLATE DEFICIENCY ANEMIA: Status: ACTIVE | Noted: 2017-01-11

## 2018-05-29 LAB
ALBUMIN SERPL BCP-MCNC: 1.8 G/DL (ref 3.5–5)
ALP SERPL-CCNC: 222 U/L (ref 46–116)
ALT SERPL W P-5'-P-CCNC: 37 U/L (ref 12–78)
ANION GAP SERPL CALCULATED.3IONS-SCNC: 5 MMOL/L (ref 4–13)
AST SERPL W P-5'-P-CCNC: 106 U/L (ref 5–45)
BASOPHILS # BLD AUTO: 0.12 THOUSANDS/ΜL (ref 0–0.1)
BASOPHILS NFR BLD AUTO: 1 % (ref 0–1)
BILIRUB SERPL-MCNC: 2.9 MG/DL (ref 0.2–1)
BUN SERPL-MCNC: 7 MG/DL (ref 5–25)
CALCIUM SERPL-MCNC: 7.7 MG/DL (ref 8.3–10.1)
CHLORIDE SERPL-SCNC: 94 MMOL/L (ref 100–108)
CO2 SERPL-SCNC: 27 MMOL/L (ref 21–32)
CREAT SERPL-MCNC: 0.54 MG/DL (ref 0.6–1.3)
EOSINOPHIL # BLD AUTO: 0.09 THOUSAND/ΜL (ref 0–0.61)
EOSINOPHIL NFR BLD AUTO: 1 % (ref 0–6)
ERYTHROCYTE [DISTWIDTH] IN BLOOD BY AUTOMATED COUNT: 19.6 % (ref 11.6–15.1)
GFR SERPL CREATININE-BSD FRML MDRD: 122 ML/MIN/1.73SQ M
GLUCOSE SERPL-MCNC: 82 MG/DL (ref 65–140)
HCT VFR BLD AUTO: 24.3 % (ref 36.5–49.3)
HGB BLD-MCNC: 7.9 G/DL (ref 12–17)
LYMPHOCYTES # BLD AUTO: 2.09 THOUSANDS/ΜL (ref 0.6–4.47)
LYMPHOCYTES NFR BLD AUTO: 17 % (ref 14–44)
MAGNESIUM SERPL-MCNC: 1.3 MG/DL (ref 1.6–2.6)
MCH RBC QN AUTO: 30 PG (ref 26.8–34.3)
MCHC RBC AUTO-ENTMCNC: 32.5 G/DL (ref 31.4–37.4)
MCV RBC AUTO: 92 FL (ref 82–98)
MONOCYTES # BLD AUTO: 1.8 THOUSAND/ΜL (ref 0.17–1.22)
MONOCYTES NFR BLD AUTO: 14 % (ref 4–12)
NEUTROPHILS # BLD AUTO: 8.55 THOUSANDS/ΜL (ref 1.85–7.62)
NEUTS SEG NFR BLD AUTO: 68 % (ref 43–75)
PHOSPHATE SERPL-MCNC: 3.8 MG/DL (ref 2.7–4.5)
PLATELET # BLD AUTO: 227 THOUSANDS/UL (ref 149–390)
PMV BLD AUTO: 10.6 FL (ref 8.9–12.7)
POTASSIUM SERPL-SCNC: 4.2 MMOL/L (ref 3.5–5.3)
PROT SERPL-MCNC: 6.3 G/DL (ref 6.4–8.2)
RBC # BLD AUTO: 2.63 MILLION/UL (ref 3.88–5.62)
SODIUM SERPL-SCNC: 126 MMOL/L (ref 136–145)
WBC # BLD AUTO: 12.65 THOUSAND/UL (ref 4.31–10.16)

## 2018-05-29 PROCEDURE — 99233 SBSQ HOSP IP/OBS HIGH 50: CPT | Performed by: FAMILY MEDICINE

## 2018-05-29 PROCEDURE — 97530 THERAPEUTIC ACTIVITIES: CPT

## 2018-05-29 PROCEDURE — 85025 COMPLETE CBC W/AUTO DIFF WBC: CPT | Performed by: FAMILY MEDICINE

## 2018-05-29 PROCEDURE — 97116 GAIT TRAINING THERAPY: CPT

## 2018-05-29 PROCEDURE — 84100 ASSAY OF PHOSPHORUS: CPT | Performed by: FAMILY MEDICINE

## 2018-05-29 PROCEDURE — 80053 COMPREHEN METABOLIC PANEL: CPT | Performed by: FAMILY MEDICINE

## 2018-05-29 PROCEDURE — 83735 ASSAY OF MAGNESIUM: CPT | Performed by: FAMILY MEDICINE

## 2018-05-29 PROCEDURE — 97535 SELF CARE MNGMENT TRAINING: CPT

## 2018-05-29 RX ORDER — MAGNESIUM SULFATE HEPTAHYDRATE 40 MG/ML
2 INJECTION, SOLUTION INTRAVENOUS
Status: COMPLETED | OUTPATIENT
Start: 2018-05-29 | End: 2018-05-29

## 2018-05-29 RX ADMIN — FOLIC ACID 1 MG: 1 TABLET ORAL at 08:17

## 2018-05-29 RX ADMIN — ENOXAPARIN SODIUM 40 MG: 40 INJECTION, SOLUTION INTRAVENOUS; SUBCUTANEOUS at 21:28

## 2018-05-29 RX ADMIN — MULTIPLE VITAMINS W/ MINERALS TAB 1 TABLET: TAB at 08:17

## 2018-05-29 RX ADMIN — LEVETIRACETAM 1500 MG: 500 TABLET ORAL at 21:27

## 2018-05-29 RX ADMIN — LORAZEPAM 0.5 MG: 0.5 TABLET ORAL at 13:32

## 2018-05-29 RX ADMIN — CEFAZOLIN SODIUM 1000 MG: 1 SOLUTION INTRAVENOUS at 12:09

## 2018-05-29 RX ADMIN — DIBASIC SODIUM PHOSPHATE, MONOBASIC POTASSIUM PHOSPHATE AND MONOBASIC SODIUM PHOSPHATE 1 TABLET: 852; 155; 130 TABLET ORAL at 16:31

## 2018-05-29 RX ADMIN — LORAZEPAM 0.5 MG: 0.5 TABLET ORAL at 21:27

## 2018-05-29 RX ADMIN — Medication 800 MG: at 17:53

## 2018-05-29 RX ADMIN — MAGNESIUM SULFATE HEPTAHYDRATE 2 G: 40 INJECTION, SOLUTION INTRAVENOUS at 08:12

## 2018-05-29 RX ADMIN — OXYCODONE HYDROCHLORIDE 5 MG: 5 TABLET ORAL at 02:25

## 2018-05-29 RX ADMIN — DIBASIC SODIUM PHOSPHATE, MONOBASIC POTASSIUM PHOSPHATE AND MONOBASIC SODIUM PHOSPHATE 1 TABLET: 852; 155; 130 TABLET ORAL at 21:27

## 2018-05-29 RX ADMIN — GABAPENTIN 300 MG: 300 CAPSULE ORAL at 08:18

## 2018-05-29 RX ADMIN — MAGNESIUM SULFATE HEPTAHYDRATE 2 G: 40 INJECTION, SOLUTION INTRAVENOUS at 10:29

## 2018-05-29 RX ADMIN — GABAPENTIN 300 MG: 300 CAPSULE ORAL at 21:27

## 2018-05-29 RX ADMIN — VITAMIN D, TAB 1000IU (100/BT) 1000 UNITS: 25 TAB at 08:17

## 2018-05-29 RX ADMIN — NICOTINE 1 PATCH: 7 PATCH, EXTENDED RELEASE TRANSDERMAL at 08:19

## 2018-05-29 RX ADMIN — LORAZEPAM 0.5 MG: 0.5 TABLET ORAL at 08:16

## 2018-05-29 RX ADMIN — CEFAZOLIN SODIUM 1000 MG: 1 SOLUTION INTRAVENOUS at 02:19

## 2018-05-29 RX ADMIN — CEFAZOLIN SODIUM 1000 MG: 1 SOLUTION INTRAVENOUS at 17:54

## 2018-05-29 RX ADMIN — DIBASIC SODIUM PHOSPHATE, MONOBASIC POTASSIUM PHOSPHATE AND MONOBASIC SODIUM PHOSPHATE 1 TABLET: 852; 155; 130 TABLET ORAL at 12:43

## 2018-05-29 RX ADMIN — LEVETIRACETAM 1500 MG: 500 TABLET ORAL at 08:16

## 2018-05-29 RX ADMIN — LISINOPRIL 10 MG: 10 TABLET ORAL at 08:17

## 2018-05-29 RX ADMIN — DIBASIC SODIUM PHOSPHATE, MONOBASIC POTASSIUM PHOSPHATE AND MONOBASIC SODIUM PHOSPHATE 1 TABLET: 852; 155; 130 TABLET ORAL at 08:16

## 2018-05-29 RX ADMIN — GABAPENTIN 300 MG: 300 CAPSULE ORAL at 16:31

## 2018-05-29 RX ADMIN — MAGNESIUM SULFATE HEPTAHYDRATE 2 G: 40 INJECTION, SOLUTION INTRAVENOUS at 12:43

## 2018-05-29 RX ADMIN — Medication 800 MG: at 08:15

## 2018-05-29 RX ADMIN — THIAMINE HCL TAB 100 MG 100 MG: 100 TAB at 08:17

## 2018-05-29 RX ADMIN — VITAM B12 100 MCG: 100 TAB at 08:17

## 2018-05-29 RX ADMIN — OXYCODONE HYDROCHLORIDE 5 MG: 5 TABLET ORAL at 16:31

## 2018-05-29 NOTE — ASSESSMENT & PLAN NOTE
CT head reviewed  Prolactin is top-normal  Transitioned to p o  Keppra 1500 mg p o  b i d    Possibly related to alcohol withdrawal  Outpatient Neurology follow-up

## 2018-05-29 NOTE — ASSESSMENT & PLAN NOTE
Malnutrition Findings:         Secondary to alcohol abuse    BMI Findings: Body mass index is 25 57 kg/m²     Consult nutrition

## 2018-05-29 NOTE — SOCIAL WORK
[facility] will not take pt until OPTIONS Assessment is completed by [employer]  Cm completed MA 51 and PASRR and faxed over to [employer]  Awaiting a call back re:when OPTIONS will be completed

## 2018-05-29 NOTE — ASSESSMENT & PLAN NOTE
Secondary to alcohol abuse and degree of malnutrition  Remains the low normal range despite replacement  Consulted Nephrology

## 2018-05-29 NOTE — ASSESSMENT & PLAN NOTE
Continue vitamin supplementation  Continue replete electrolytes  Continue P  O  Ativan 2 5 mg p o  t i d   IV Ativan p r n    Psychiatry input appreciated, noted recommendations to start naltrexone 25 mg daily to help prevent return to heavy drinking after discharge  Plan to discharge to SNF, patient undergoing option Ng process due to prior history of inpatient psychiatric hospitalizations  Patient does not meet criteria for involuntary commitment to and is deemed capacitated to make healthcare decisions by Psychiatry

## 2018-05-29 NOTE — OCCUPATIONAL THERAPY NOTE
OT Note     05/29/18 1029   Restrictions/Precautions   Other Precautions Fall Risk;Multiple lines   Activity Tolerance   Activity Tolerance (Tolerates tx session)   Assessment   Assessment Presents seated recliner at bedside  Pt and spouse educated A equipment via explanation and demonstration  Eductaed karis [facility]CH[facility] to facilitate increased I thru LB dress, sock side and [address]  Receptive to any and all info  Call bell and phone in reach on completion     Recommendation   Recommendation (Continue OT services )

## 2018-05-29 NOTE — PHYSICAL THERAPY NOTE
PT Treatment Note      05/29/18 1011   Restrictions/Precautions   Other Precautions (Multiple lines;Telemetry; Fall Risk;Pain;Bed Alarm)   Subjective   Subjective Reports limited mobility at home for the past month  Ambulating from bed to [facility]  Bed Mobility   Supine to Sit 4  Minimal assistance   Additional items Assist x 1;Bedrails;Verbal cues   Transfers   Sit to Stand 4  Minimal assistance   Additional items Assist x 1;Verbal cues   Stand to Sit 4  Minimal assistance   Additional items Assist x 1;Verbal cues;Armrests   Stand pivot 4  Minimal assistance   Ambulation/Elevation   Gait Assistance 4  Minimal assist  (CGA)   Additional items Assist x 1   Assistive Device Rolling walker   Distance 20'   Balance   Static Sitting Good   Dynamic Sitting Fair +   Static Standing Fair   Dynamic Standing Fair   Ambulatory Fair  (with RW)   Endurance Deficit   Endurance Deficit Yes   Activity Tolerance   Activity Tolerance Patient limited by fatigue   Assessment   Prognosis Good   Problem List Decreased strength;Decreased endurance; Impaired balance;Decreased mobility; Decreased safety awareness;Pain   Assessment Performing bed mobility, tx and ambulation at (min) x1 level of function  Pt with decreased balance mobility transfers and ambulation requiring assistance for all bed mobility transfers and ambulation with limited ambulation tolerance  Pt is in need of continued activity in PT to improve impairments and functional deficits   Plan   Treatment/Interventions Functional transfer training;LE strengthening/ROM; Therapeutic exercise; Endurance training;Bed mobility;Gait training   Progress Slow progress, decreased activity tolerance   Recommendation   Recommendation Short-term skilled PT   Pt  OOB with call bell within reach and alarm on at end of PT session

## 2018-05-29 NOTE — ASSESSMENT & PLAN NOTE
Secondary to alcoholic hepatitis  GI evaluated, patient not candidate for steroids  Continue to monitor LFTs  Counseling on total alcohol cessation

## 2018-05-29 NOTE — PROGRESS NOTES
Progress Note - Deng Griffin 1965, 47 y o  male MRN: [id number]    Unit/Bed#: 422-01 Encounter: [id number]    Primary Care Provider: Dino Boudreaux PA-C   Date and time admitted to hospital: 5/22/2018  3:08 PM        * Cellulitis of right lower extremity   Assessment & Plan    Continue Ancef  Wound culture growing Staph that is sensitive to Ancef  Transition to Keflex on discharged  Discussed with the patient's ex-wife and she expressed concern regarding appearance of his right lower extremity being more erythematous and discolored than previously, will consult Podiatry for his and evaluation        Hypomagnesemia   Assessment & Plan    Secondary to alcohol abuse and degree of malnutrition  Remains the low normal range despite replacement  Consulted Nephrology          Hyponatremia   Assessment & Plan    Acute on chronic  Fluid restrict to 1800cc / day   Consult Nephrology          Alcohol dependence ([address] )   Assessment & Plan    Continue vitamin supplementation  Continue replete electrolytes  Continue P  O  Ativan 2 5 mg p o  t i d   IV Ativan p r n  Psychiatry input appreciated, noted recommendations to start naltrexone 25 mg daily to help prevent return to heavy drinking after discharge  Plan to discharge to SNF, patient undergoing option Ng process due to prior history of inpatient psychiatric hospitalizations  Patient does not meet criteria for involuntary commitment to and is deemed capacitated to make healthcare decisions by Psychiatry         Seizure-like activity [facility])   Assessment & Plan    CT head reviewed  Prolactin is top-normal  Transitioned to p o  Keppra 1500 mg p o  b i d  Possibly related to alcohol withdrawal  Outpatient Neurology follow-up        Malnutrition of moderate degree ([facility] [address] )   Assessment & Plan    Malnutrition Findings:         Secondary to alcohol abuse    BMI Findings: Body mass index is 25 57 kg/m²     Consult nutrition        Transaminitis   Assessment & Plan Secondary to alcoholic hepatitis  GI evaluated, patient not candidate for steroids  Continue to monitor LFTs  Counseling on total alcohol cessation        Anemia   Assessment & Plan    GI input appreciated he will require outpatient follow-up for EGD and colonoscopy  Hemoglobin stable status post 1 unit PRBCs        Tobacco use   Assessment & Plan    -nicotine patch  -smoking cessation counseling          VTE Pharmacologic Prophylaxis:   Pharmacologic: Enoxaparin (Lovenox)  Mechanical VTE Prophylaxis in Place: No    Patient Centered Rounds: I have performed bedside rounds with nursing staff today  Discussions with Specialists or Other Care Team Provider: Reviewed psychiatry recommendations    Education and Discussions with Family / Patient: Patient's ex wife    Time Spent for Care: 45 minutes  More than 50% of total time spent on counseling and coordination of care as described above  Current Length of Stay: 7 day(s)    Current Patient Status: Inpatient   Certification Statement: The patient will continue to require additional inpatient hospital stay due to need for daily labs, optioning process    Discharge Plan: TBD    Code Status: Level 1 - Full Code      Subjective:   Patient seen and examined  He is c/o RLE pain and erythema, wife at bedside and report worsening appearance of his RLE from how she remembers it  The patient has no other complaints, denies respiratory or GI symptoms, reports normal urination  No o/n events  Objective:     Vitals:   Temp (24hrs), Av 4 °F (36 9 °C), Min:97 4 °F (36 3 °C), Max:99 6 °F (37 6 °C)    HR:  [] 82  Resp:  [18] 18  BP: (110-140)/(67-82) 135/79  SpO2:  [94 %-100 %] 100 %  Body mass index is 25 57 kg/m²  Input and Output Summary (last 24 hours):        Intake/Output Summary (Last 24 hours) at 18 1214  Last data filed at 18 1121   Gross per 24 hour   Intake              600 ml   Output             2000 ml   Net            -1400 ml Physical Exam:     Physical Exam   Constitutional: He is oriented to person, place, and time  No distress  HENT:   Head: Normocephalic and atraumatic  Eyes: Scleral icterus is present  Neck: No JVD present  Cardiovascular: Normal rate and regular rhythm  No murmur heard  Pulmonary/Chest: Effort normal  No respiratory distress  He has no wheezes  He has no rales  Abdominal: He exhibits distension  There is no tenderness  There is no guarding  Musculoskeletal: He exhibits no edema  Neurological: He is alert and oriented to person, place, and time  Skin: There is erythema (R calf)  Psychiatric: He has a normal mood and affect  Additional Data:     Labs:      Results from last 7 days  Lab Units 05/29/18  0533   WBC Thousand/uL 12 65*   HEMOGLOBIN g/dL 7 9*   HEMATOCRIT % 24 3*   PLATELETS Thousands/uL 227   NEUTROS PCT % 68   LYMPHS PCT % 17   MONOS PCT % 14*   EOS PCT % 1       Results from last 7 days  Lab Units 05/29/18  0533   SODIUM mmol/L 126*   POTASSIUM mmol/L 4 2   CHLORIDE mmol/L 94*   CO2 mmol/L 27   BUN mg/dL 7   CREATININE mg/dL 0 54*   CALCIUM mg/dL 7 7*   TOTAL PROTEIN g/dL 6 3*   BILIRUBIN TOTAL mg/dL 2 90*   ALK PHOS U/L 222*   ALT U/L 37   AST U/L 106*   GLUCOSE RANDOM mg/dL 82       Results from last 7 days  Lab Units 05/22/18  1545   INR  1 12                 * I Have Reviewed All Lab Data Listed Above  * Additional Pertinent Lab Tests Reviewed: [address] Admission Reviewed    Imaging:    Imaging Reports Reviewed Today Include: CT brain, CT abd and pelvis    Recent Cultures (last 7 days):       Results from last 7 days  Lab Units 05/22/18  1631 05/22/18  1625 05/22/18  1545   BLOOD CULTURE   --  No Growth After 5 Days  No Growth After 5 Days     GRAM STAIN RESULT  No Polys or Bacteria seen  --   --    WOUND CULTURE  1+ Growth of Staphylococcus aureus*  1+ Growth of Beta Hemolytic Streptococcus Group A*  1+ Growth of   --   --        Last 24 Hours Medication List:     Current Facility-Administered Medications:  albuterol 2 5 mg Nebulization Q6H PRN Addi Land, DO    cefazolin 1,000 mg Intravenous Q8H Addi Land, DO Last Rate: 1,000 mg (05/29/18 1209)   cholecalciferol 1,000 Units Oral Daily Addi Land, DO    cyanocobalamin 100 mcg Oral Daily Addi Land, DO    enoxaparin 40 mg Subcutaneous Q24H Addi Land, DO    folic acid 1 mg Oral Daily Addi Land, DO    gabapentin 300 mg Oral TID Addi Land, DO    levETIRAcetam 1,500 mg Oral Q12H [address] Jayce Martínez MD    lisinopril 10 mg Oral Daily Addi Land, DO    LORazepam 1 mg Intravenous Q4H PRN Addi Land, DO    LORazepam 0 5 mg Oral TID Jayce Martínez MD    magnesium oxide 800 mg Oral BID Zoey Mejía MD    magnesium sulfate 2 g Intravenous Q2H Dima Fu MD    multivitamin-minerals 1 tablet Oral Daily Addi Land, DO    nicotine 1 patch Transdermal Daily Td Salinas DO    ondansetron 4 mg Intravenous Q6H PRN Addi Land, DO    oxyCODONE 5 mg Oral BID PRN Jayce Martínez MD    potassium-sodium phosphateS 1 tablet Oral 4x Daily (with meals and at bedtime) Jayce Martínez MD    thiamine 100 mg Oral Daily Addi Land, DO         Today, Patient Was Seen By: Kerrie Miller MD    ** Please Note: Dictation voice to text software may have been used in the creation of this document   **

## 2018-05-29 NOTE — ASSESSMENT & PLAN NOTE
Continue Ancef  Wound culture growing Staph that is sensitive to Ancef  Transition to Keflex on discharged  Discussed with the patient's ex-wife and she expressed concern regarding appearance of his right lower extremity being more erythematous and discolored than previously, will consult Podiatry for his and evaluation

## 2018-05-30 PROBLEM — K70.10 ALCOHOLIC HEPATITIS WITHOUT ASCITES: Status: ACTIVE | Noted: 2017-03-07

## 2018-05-30 LAB
ALBUMIN SERPL BCP-MCNC: 1.8 G/DL (ref 3.5–5)
ALP SERPL-CCNC: 207 U/L (ref 46–116)
ALT SERPL W P-5'-P-CCNC: 35 U/L (ref 12–78)
AMMONIA PLAS-SCNC: 23 UMOL/L (ref 11–35)
ANION GAP SERPL CALCULATED.3IONS-SCNC: 6 MMOL/L (ref 4–13)
AST SERPL W P-5'-P-CCNC: 101 U/L (ref 5–45)
BASOPHILS # BLD AUTO: 0.1 THOUSANDS/ΜL (ref 0–0.1)
BASOPHILS NFR BLD AUTO: 1 % (ref 0–1)
BILIRUB SERPL-MCNC: 2.5 MG/DL (ref 0.2–1)
BUN SERPL-MCNC: 7 MG/DL (ref 5–25)
CALCIUM SERPL-MCNC: 7.7 MG/DL (ref 8.3–10.1)
CHLORIDE SERPL-SCNC: 93 MMOL/L (ref 100–108)
CO2 SERPL-SCNC: 28 MMOL/L (ref 21–32)
CREAT SERPL-MCNC: 0.51 MG/DL (ref 0.6–1.3)
CREAT UR-MCNC: 51.5 MG/DL
EOSINOPHIL # BLD AUTO: 0.11 THOUSAND/ΜL (ref 0–0.61)
EOSINOPHIL NFR BLD AUTO: 1 % (ref 0–6)
ERYTHROCYTE [DISTWIDTH] IN BLOOD BY AUTOMATED COUNT: 19.8 % (ref 11.6–15.1)
GFR SERPL CREATININE-BSD FRML MDRD: 124 ML/MIN/1.73SQ M
GLUCOSE SERPL-MCNC: 76 MG/DL (ref 65–140)
HCT VFR BLD AUTO: 23.3 % (ref 36.5–49.3)
HGB BLD-MCNC: 7.6 G/DL (ref 12–17)
LYMPHOCYTES # BLD AUTO: 2.16 THOUSANDS/ΜL (ref 0.6–4.47)
LYMPHOCYTES NFR BLD AUTO: 17 % (ref 14–44)
MAGNESIUM SERPL-MCNC: 1.3 MG/DL (ref 1.6–2.6)
MCH RBC QN AUTO: 30 PG (ref 26.8–34.3)
MCHC RBC AUTO-ENTMCNC: 32.6 G/DL (ref 31.4–37.4)
MCV RBC AUTO: 92 FL (ref 82–98)
MONOCYTES # BLD AUTO: 1.59 THOUSAND/ΜL (ref 0.17–1.22)
MONOCYTES NFR BLD AUTO: 13 % (ref 4–12)
NEUTROPHILS # BLD AUTO: 8.49 THOUSANDS/ΜL (ref 1.85–7.62)
NEUTS SEG NFR BLD AUTO: 68 % (ref 43–75)
PHOSPHATE SERPL-MCNC: 3.7 MG/DL (ref 2.7–4.5)
PLATELET # BLD AUTO: 248 THOUSANDS/UL (ref 149–390)
PMV BLD AUTO: 10.6 FL (ref 8.9–12.7)
POTASSIUM SERPL-SCNC: 4 MMOL/L (ref 3.5–5.3)
PROT SERPL-MCNC: 6.3 G/DL (ref 6.4–8.2)
RBC # BLD AUTO: 2.53 MILLION/UL (ref 3.88–5.62)
SODIUM 24H UR-SCNC: 67 MOL/L
SODIUM SERPL-SCNC: 127 MMOL/L (ref 136–145)
T4 FREE SERPL-MCNC: 1.22 NG/DL (ref 0.76–1.46)
WBC # BLD AUTO: 12.45 THOUSAND/UL (ref 4.31–10.16)

## 2018-05-30 PROCEDURE — 82140 ASSAY OF AMMONIA: CPT | Performed by: INTERNAL MEDICINE

## 2018-05-30 PROCEDURE — 84439 ASSAY OF FREE THYROXINE: CPT | Performed by: FAMILY MEDICINE

## 2018-05-30 PROCEDURE — 99233 SBSQ HOSP IP/OBS HIGH 50: CPT | Performed by: FAMILY MEDICINE

## 2018-05-30 PROCEDURE — 82570 ASSAY OF URINE CREATININE: CPT | Performed by: INTERNAL MEDICINE

## 2018-05-30 PROCEDURE — 83735 ASSAY OF MAGNESIUM: CPT | Performed by: INTERNAL MEDICINE

## 2018-05-30 PROCEDURE — 84300 ASSAY OF URINE SODIUM: CPT | Performed by: INTERNAL MEDICINE

## 2018-05-30 PROCEDURE — 97530 THERAPEUTIC ACTIVITIES: CPT

## 2018-05-30 PROCEDURE — 83735 ASSAY OF MAGNESIUM: CPT | Performed by: FAMILY MEDICINE

## 2018-05-30 PROCEDURE — 85025 COMPLETE CBC W/AUTO DIFF WBC: CPT | Performed by: FAMILY MEDICINE

## 2018-05-30 PROCEDURE — 84100 ASSAY OF PHOSPHORUS: CPT | Performed by: FAMILY MEDICINE

## 2018-05-30 PROCEDURE — 80053 COMPREHEN METABOLIC PANEL: CPT | Performed by: FAMILY MEDICINE

## 2018-05-30 PROCEDURE — 97110 THERAPEUTIC EXERCISES: CPT

## 2018-05-30 RX ORDER — DIPHENHYDRAMINE HYDROCHLORIDE 50 MG/ML
25 INJECTION INTRAMUSCULAR; INTRAVENOUS EVERY 6 HOURS PRN
Status: DISCONTINUED | OUTPATIENT
Start: 2018-05-30 | End: 2018-06-07 | Stop reason: HOSPADM

## 2018-05-30 RX ORDER — MAGNESIUM SULFATE HEPTAHYDRATE 40 MG/ML
2 INJECTION, SOLUTION INTRAVENOUS
Status: COMPLETED | OUTPATIENT
Start: 2018-05-30 | End: 2018-05-30

## 2018-05-30 RX ORDER — LEVOFLOXACIN 5 MG/ML
750 INJECTION, SOLUTION INTRAVENOUS EVERY 24 HOURS
Status: DISCONTINUED | OUTPATIENT
Start: 2018-05-30 | End: 2018-06-06

## 2018-05-30 RX ADMIN — DIBASIC SODIUM PHOSPHATE, MONOBASIC POTASSIUM PHOSPHATE AND MONOBASIC SODIUM PHOSPHATE 1 TABLET: 852; 155; 130 TABLET ORAL at 21:09

## 2018-05-30 RX ADMIN — VITAM B12 100 MCG: 100 TAB at 08:15

## 2018-05-30 RX ADMIN — GABAPENTIN 300 MG: 300 CAPSULE ORAL at 08:13

## 2018-05-30 RX ADMIN — DIBASIC SODIUM PHOSPHATE, MONOBASIC POTASSIUM PHOSPHATE AND MONOBASIC SODIUM PHOSPHATE 1 TABLET: 852; 155; 130 TABLET ORAL at 14:30

## 2018-05-30 RX ADMIN — Medication 800 MG: at 08:13

## 2018-05-30 RX ADMIN — NICOTINE 1 PATCH: 7 PATCH, EXTENDED RELEASE TRANSDERMAL at 08:19

## 2018-05-30 RX ADMIN — CEFAZOLIN SODIUM 1000 MG: 1 SOLUTION INTRAVENOUS at 10:54

## 2018-05-30 RX ADMIN — Medication 800 MG: at 18:11

## 2018-05-30 RX ADMIN — FOLIC ACID 1 MG: 1 TABLET ORAL at 08:13

## 2018-05-30 RX ADMIN — MULTIPLE VITAMINS W/ MINERALS TAB 1 TABLET: TAB at 08:13

## 2018-05-30 RX ADMIN — OXYCODONE HYDROCHLORIDE 5 MG: 5 TABLET ORAL at 14:32

## 2018-05-30 RX ADMIN — VITAMIN D, TAB 1000IU (100/BT) 1000 UNITS: 25 TAB at 08:14

## 2018-05-30 RX ADMIN — MAGNESIUM SULFATE HEPTAHYDRATE 2 G: 40 INJECTION, SOLUTION INTRAVENOUS at 16:50

## 2018-05-30 RX ADMIN — LORAZEPAM 0.5 MG: 0.5 TABLET ORAL at 08:13

## 2018-05-30 RX ADMIN — ENOXAPARIN SODIUM 40 MG: 40 INJECTION, SOLUTION INTRAVENOUS; SUBCUTANEOUS at 21:09

## 2018-05-30 RX ADMIN — LORAZEPAM 0.5 MG: 0.5 TABLET ORAL at 14:30

## 2018-05-30 RX ADMIN — DIPHENHYDRAMINE HYDROCHLORIDE 25 MG: 50 INJECTION, SOLUTION INTRAMUSCULAR; INTRAVENOUS at 16:05

## 2018-05-30 RX ADMIN — THIAMINE HCL TAB 100 MG 100 MG: 100 TAB at 08:14

## 2018-05-30 RX ADMIN — MAGNESIUM SULFATE HEPTAHYDRATE 2 G: 40 INJECTION, SOLUTION INTRAVENOUS at 11:53

## 2018-05-30 RX ADMIN — CEFAZOLIN SODIUM 1000 MG: 1 SOLUTION INTRAVENOUS at 01:21

## 2018-05-30 RX ADMIN — LISINOPRIL 10 MG: 10 TABLET ORAL at 08:14

## 2018-05-30 RX ADMIN — DIBASIC SODIUM PHOSPHATE, MONOBASIC POTASSIUM PHOSPHATE AND MONOBASIC SODIUM PHOSPHATE 1 TABLET: 852; 155; 130 TABLET ORAL at 18:11

## 2018-05-30 RX ADMIN — DIBASIC SODIUM PHOSPHATE, MONOBASIC POTASSIUM PHOSPHATE AND MONOBASIC SODIUM PHOSPHATE 1 TABLET: 852; 155; 130 TABLET ORAL at 08:14

## 2018-05-30 RX ADMIN — VANCOMYCIN 125 MG: KIT at 21:09

## 2018-05-30 RX ADMIN — LORAZEPAM 0.5 MG: 0.5 TABLET ORAL at 21:09

## 2018-05-30 RX ADMIN — MAGNESIUM SULFATE HEPTAHYDRATE 2 G: 40 INJECTION, SOLUTION INTRAVENOUS at 14:28

## 2018-05-30 RX ADMIN — LEVOFLOXACIN 750 MG: 5 INJECTION, SOLUTION INTRAVENOUS at 21:20

## 2018-05-30 RX ADMIN — GABAPENTIN 300 MG: 300 CAPSULE ORAL at 18:11

## 2018-05-30 RX ADMIN — LEVETIRACETAM 1500 MG: 500 TABLET ORAL at 08:13

## 2018-05-30 RX ADMIN — VANCOMYCIN HYDROCHLORIDE 1250 MG: 1 INJECTION, POWDER, LYOPHILIZED, FOR SOLUTION INTRAVENOUS at 19:23

## 2018-05-30 RX ADMIN — GABAPENTIN 300 MG: 300 CAPSULE ORAL at 23:08

## 2018-05-30 RX ADMIN — LEVETIRACETAM 1500 MG: 500 TABLET ORAL at 21:09

## 2018-05-30 NOTE — PHYSICAL THERAPY NOTE
PT Treatment Note      05/30/18 1042   Restrictions/Precautions   Other Precautions (Multiple lines;Telemetry; Fall Risk;Pain;Bed Alarm)   Subjective   Subjective Tired  Everyone keeps bothering me  Bed Mobility   Supine to Sit 4  Minimal assistance   Additional items Assist x 1;HOB elevated; Bedrails; Increased time required;Verbal cues   Transfers   Sit to Stand 4  Minimal assistance   Additional items Assist x 1;Verbal cues   Stand to Sit 4  Minimal assistance   Additional items Assist x 1; Armrests; Verbal cues   Stand pivot 4  Minimal assistance   Ambulation/Elevation   Gait Assistance 4  Minimal assist   Additional items Assist x 1   Assistive Device None   Distance 3' bed to chair refused further ambulation d/t fatigue   Balance   Static Sitting Good   Dynamic Sitting Fair +   Static Standing Fair   Dynamic Standing Fair   Ambulatory Fair   Endurance Deficit   Endurance Deficit Yes   Activity Tolerance   Activity Tolerance Patient limited by fatigue   Exercises   Hip Flexion 20 reps   Hip Abduction 20 reps   Hip Adduction 20 reps   Knee AROM Long Arc Quad 20 reps   Ankle Pumps 20 reps   Assessment   Prognosis Good   Problem List Decreased strength;Decreased endurance; Impaired balance;Decreased coordination;Decreased safety awareness;Pain   Assessment Performing bed mobility, tx and ambulation at (min) x1 level of function  Pt with decreased balance mobility transfers and ambulation requiring assistance for all bed mobility transfers and ambulation with limited ambulation tolerance  Pt  fatigued refusing further ambullation  Encouraged to participate  Plan   Treatment/Interventions Functional transfer training;LE strengthening/ROM; Therapeutic exercise; Endurance training;Bed mobility;Gait training   Progress Slow progress, decreased activity tolerance   Recommendation   Recommendation Short-term skilled PT   Pt  OOB with call bell within reach  and alarm on at end of PT session

## 2018-05-30 NOTE — ASSESSMENT & PLAN NOTE
Secondary to alcohol abuse and degree of malnutrition  Remains the low normal range despite replacement  Nephrology input appreciated, will check urine Mg  Continue with replacement

## 2018-05-30 NOTE — CONSULTS
Consultation - Nephrology   rEwin Renee 47 y o  male MRN: [id number]  Unit/Bed#: 991-12 Encounter: [id number]    A/P:  1  Hyponatremia:due to chronic alcohol abuse and decreased solute intake  He has a markedly low albumin and evidence of beer potomania  Will check urine electrolytes to evaluate his volume status  Would increase fluid restriction but allow ice chips 1/2 cup each shift  Of note, he was on furosemide prior to admission which has been discontinued  2  Hypomagnesemia: due to alcoholism and renal excretion  Will check a urine magnesium to look for wasting  Would give magnesium sulfate once and continue magnesium orally  3  Alcoholic liver disease: check ammonia level  4  Hypertension: stable  5  Transaminitis: due to above  6  Cellulitis on Ancef         Thank you for allowing us to participate in the care of your patient  Please feel free to contact us regarding the care of this patient, or any other questions/concerns that may be applicable      Patient Active Problem List   Diagnosis    Alcohol dependence ([facility] [address] )    Tobacco use    Essential hypertension    COPD (chronic obstructive pulmonary disease) ([address] )    H/O suicide attempt    H/O cervical spine surgery    Cholelithiasis    Hyponatremia    Dietary folate deficiency anemia    Hepatomegaly    Hepatic steatosis    Hypomagnesemia    Elevated alkaline phosphatase level    Transaminitis    Vitamin D deficiency    Iron deficiency    Generalized weakness    Body mass index (BMI) 21 0-21 9, adult    Malnutrition of moderate degree (HCC)    Seizure (HCC)    Continuous chronic alcoholism ([address] )    Incisional hernia    Hx of seizure disorder    Seizure-like activity ([address] )    Diarrhea    Cellulitis of right lower extremity       History of Present Illness   Physician Requesting Consult: Huy Major MD  Reason for Consult / Principal Problem: hyponatremia  Hx and PE limited by:   HPI: Erwin Renee is a 47y o  year old male who presents with worsening hyponatremia during this admission  He was admitted with 3 days of right lower extremity painful cellulitis and presented with a serum sodium of 121  He was treated with normal saline  With volume expansion his sodium mainor to 132 but has been dropping subsequently  He states he is eating ad drinking, however, an 1800 ml fluid restriction has been imposed  He denies any trauma to his leg  He has been drinking 3 cans of beer daily, however, he was a very heavy drinker prior to this, drinking a case of beer daily  He was found to be nutritionally depleted with hypomagnesemia and hypoalbuminemia    History obtained from chart review and the patient    Review of Systems - Negative except as mentioned above in HPI, more specifics as mentioned below    Review of Systems - General ROS: negative  Ophthalmic ROS: negative  ENT ROS: negative  Hematological and Lymphatic ROS: negative  Endocrine ROS: negative  Respiratory ROS: no cough, shortness of breath, or wheezing  Cardiovascular ROS: no chest pain or dyspnea on exertion  Gastrointestinal ROS: no abdominal pain, change in bowel habits, or black or bloody stools  Genito-Urinary ROS: no dysuria, trouble voiding, or hematuria  Musculoskeletal ROS: negative  Neurological ROS: no TIA or stroke symptoms  Dermatological ROS: positive for rash and skin lesion changes with erythema    Historical Information   Past Medical History:   Diagnosis Date    Alcohol abuse     Bowel obstruction (HCC)     Bowel perforation (HCC)     Cardiac disease     Continuous chronic alcoholism ([facility] [address] ) 10/5/2017    COPD (chronic obstructive pulmonary disease) (HCC)     History of shoulder surgery     Right shoulder    History of transfusion     Hx of cervical spine surgery     Hypertension     Incisional hernia 10/8/2017    MI, old     Mitral regurgitation     Psychiatric disorder     Seizures ([address] )      Past Surgical History:   Procedure Laterality Date    BACK SURGERY      ESOPHAGOGASTRODUODENOSCOPY N/A 2016    Procedure: ESOPHAGOGASTRODUODENOSCOPY (EGD); Surgeon: Jose Orr MD;  Location: [facility]; Service:     LAPAROTOMY N/A 10/25/2016    Procedure: LAPAROTOMY EXPLORATORY;  Surgeon: Willie Ram MD;  Location: [facility];  Service:     MOUTH SURGERY      SHOULDER SURGERY Right     SMALL INTESTINE SURGERY       Social History   History   Alcohol Use    3 6 oz/week    6 Cans of beer per week     Comment: 6 25oz cans a day   disabled  History   Drug Use No   Denies  History   Smoking Status    Current Every Day Smoker    Packs/day: 2 00    Years: 15 00   Smokeless Tobacco    Never Used     History reviewed  No pertinent family history  Father  of pancreatic cancer and mother  of breast cancer    Meds/Allergies   all current active meds have been reviewed, current meds: Current Facility-Administered Medications   Medication Dose Route Frequency    albuterol inhalation solution 2 5 mg  2 5 mg Nebulization Q6H PRN    ceFAZolin (ANCEF) IVPB (premix) 1,000 mg  1,000 mg Intravenous Q8H    cholecalciferol (VITAMIN D3) tablet 1,000 Units  1,000 Units Oral Daily    cyanocobalamin (VITAMIN B-12) tablet 100 mcg  100 mcg Oral Daily    enoxaparin (LOVENOX) subcutaneous injection 40 mg  40 mg Subcutaneous T30B    folic acid (FOLVITE) tablet 1 mg  1 mg Oral Daily    gabapentin (NEURONTIN) capsule 300 mg  300 mg Oral TID    levETIRAcetam (KEPPRA) tablet 1,500 mg  1,500 mg Oral Q12H [address]    lisinopril (ZESTRIL) tablet 10 mg  10 mg Oral Daily    LORazepam (ATIVAN) 2 mg/mL injection 1 mg  1 mg Intravenous Q4H PRN    LORazepam (ATIVAN) tablet 0 5 mg  0 5 mg Oral TID    magnesium oxide (MAG-OX) tablet 800 mg  800 mg Oral BID    multivitamin-minerals (CENTRUM) tablet 1 tablet  1 tablet Oral Daily    nicotine (NICODERM CQ) 7 mg/24hr TD 24 hr patch 1 patch  1 patch Transdermal Daily    ondansetron (ZOFRAN) injection 4 mg  4 mg Intravenous Q6H PRN    oxyCODONE (ROXICODONE) IR tablet 5 mg  5 mg Oral BID PRN    potassium-sodium phosphateS (K-PHOS,PHOSPHA 250) -250 mg tablet 1 tablet  1 tablet Oral 4x Daily (with meals and at bedtime)    thiamine (VITAMIN B1) tablet 100 mg  100 mg Oral Daily    and PTA meds:  Prescriptions Prior to Admission   Medication    albuterol (PROVENTIL HFA,VENTOLIN HFA) 90 mcg/act inhaler    cholecalciferol (VITAMIN D3) 1,000 units tablet    cyanocobalamin (VITAMIN B-12) 100 mcg tablet    folic acid (FOLVITE) 1 mg tablet    furosemide (LASIX) 20 mg tablet    gabapentin (NEURONTIN) 100 mg capsule    levETIRAcetam (KEPPRA) 1000 MG tablet    lisinopril (ZESTRIL) 10 mg tablet    magnesium oxide (MAG-OX) 400 mg    sodium chloride 1 g tablet    acetaminophen (TYLENOL) 325 mg tablet    albuterol (2 5 mg/3 mL) 0 083 % nebulizer solution    lidocaine (LIDODERM) 5 %    thiamine 100 MG tablet         No Known Allergies    Objective     Intake/Output Summary (Last 24 hours) at 05/30/18 0857  Last data filed at 05/30/18 0857   Gross per 24 hour   Intake             1680 ml   Output             3225 ml   Net            -1545 ml       Invasive Devices:        Physical Exam      I/O last 3 completed shifts: In: 1800 [P O :1800]  Out: 4250 [Urine:4250]    Vitals:    05/30/18 0732   BP: 113/68   Pulse: 86   Resp: 18   Temp: 97 9 °F (36 6 °C)   SpO2: 94%       Gen: in NAD, Alert/Awake  HEENT: has sclerous icterus, MMM, neck supple  CV: +S1/S2, RRR  Lungs: dec bilaterally  Abd: +BS, soft NT has ventral hernia with ascites  Ext: all four extremities are warm  Skin:  has marked erythema of the RLE from mid calf down  Neuro: CN II-XII intact    Current Weight: Weight - Scale: 66 kg (145 lb 8 1 oz)  First Weight: Weight - Scale: 59 9 kg (132 lb 0 9 oz)    Lab Results:  I have personally reviewed pertinent labs      CBC: Lab Results   Component Value Date    WBC 12 45 (H) 05/30/2018    HGB 7 6 (L) 05/30/2018    HCT 23 3 (L) 05/30/2018    MCV 92 05/30/2018     05/30/2018    MCH 30 0 05/30/2018    MCHC 32 6 05/30/2018    RDW 19 8 (H) 05/30/2018    MPV 10 6 05/30/2018     CMP: Lab Results   Component Value Date     (L) 05/30/2018    K 4 0 05/30/2018    CL 93 (L) 05/30/2018    CO2 28 05/30/2018    ANIONGAP 6 05/30/2018    BUN 7 05/30/2018    CREATININE 0 51 (L) 05/30/2018    GLUCOSE 76 05/30/2018    CALCIUM 7 7 (L) 05/30/2018     (H) 05/30/2018    ALT 35 05/30/2018    ALKPHOS 207 (H) 05/30/2018    PROT 6 3 (L) 05/30/2018    BILITOT 2 50 (H) 05/30/2018    EGFR 124 05/30/2018     Phosphorus:   Lab Results   Component Value Date    PHOS 3 7 05/30/2018     Magnesium:   Lab Results   Component Value Date    MG 1 3 (L) 05/30/2018     Urinalysis: No results found for: COLORU, CLARITYU, SPECGRAV, PHUR, LEUKOCYTESUR, NITRITE, PROTEINUA, GLUCOSEU, KETONESU, BILIRUBINUR, BLOODU  Ionized Calcium: No results found for: CAION  Coagulation: No results found for: PT, INR, APTT  Troponin: No results found for: TROPONINI  ABG: No results found for: PHART, UKP6KCF, PO2ART, UKZ1QSG, L1CEIYFX, BEART, SOURCE      Results from last 7 days  Lab Units 05/30/18  0526 05/29/18  0533 05/28/18  0509   SODIUM mmol/L 127* 126* 128*   POTASSIUM mmol/L 4 0 4 2 3 9   CHLORIDE mmol/L 93* 94* 95*   CO2 mmol/L 28 27 28   BUN mg/dL 7 7 4*   CREATININE mg/dL 0 51* 0 54* 0 54*   CALCIUM mg/dL 7 7* 7 7* 7 7*   TOTAL PROTEIN g/dL 6 3* 6 3* 6 3*   BILIRUBIN TOTAL mg/dL 2 50* 2 90* 2 60*   ALK PHOS U/L 207* 222* 272*   ALT U/L 35 37 45   AST U/L 101* 106* 127*   GLUCOSE RANDOM mg/dL 76 82 94       Radiology review:  No results found  EKG, Pathology, and Other Studies: reviewed      Counseling / Coordination of Care  Total floor / unit time spent today 40 minutes  Greater than 50% of total time was spent with the patient and / or family counseling and / or coordination of care   A description of the counseling / coordination of care: follows    Elicia Glynn MD

## 2018-05-30 NOTE — OCCUPATIONAL THERAPY NOTE
OT Note     05/30/18 1314   General   Missed Treatment Reason Patient refusal   Assessment   Assessment OT attempted see -pt  this p m  Presents in sidelying  Declines participation secondary "just got back into bed and I'm really tired "  Continue as able

## 2018-05-30 NOTE — ASSESSMENT & PLAN NOTE
Acute on chronic, secondary to beer potomania  Nephrology input appreciated  Will fluid restrict further to 1500cc / day   Monitor Na level

## 2018-05-30 NOTE — ASSESSMENT & PLAN NOTE
Stable  GI evaluated, patient not candidate for steroids  Continue to monitor LFTs, check ammonia level and INR  Counseling on total alcohol cessation

## 2018-05-30 NOTE — ASSESSMENT & PLAN NOTE
Due to increased erythema will change Abx to Vanco/Levaquin, added PO Vanco for c diff prophylaxis  Wound culture growing Staph that is sensitive to Ancef - patient received 1 week of therapy - but may have been insufficient wound cultures given worsening erythema  Podiatry input appreciated

## 2018-05-30 NOTE — PROGRESS NOTES
Progress Note - Marissa Chin 1965, 47 y o  male MRN: [id number]    Unit/Bed#: 161-02 Encounter: [id number]    Primary Care Provider: Simin Navarrete PA-C   Date and time admitted to hospital: 5/22/2018  3:08 PM        * Cellulitis of right lower extremity   Assessment & Plan    Due to increased erythema will change Abx to Vanco/Levaquin, added PO Vanco for c diff prophylaxis  Wound culture growing Staph that is sensitive to Ancef - patient received 1 week of therapy - but may have been insufficient wound cultures given worsening erythema  Podiatry input appreciated        Alcoholic hepatitis without ascites   Assessment & Plan    Stable  GI evaluated, patient not candidate for steroids  Continue to monitor LFTs, check ammonia level and INR  Counseling on total alcohol cessation        Hypomagnesemia   Assessment & Plan    Secondary to alcohol abuse and degree of malnutrition  Remains the low normal range despite replacement  Nephrology input appreciated, will check urine Mg  Continue with replacement          Hyponatremia   Assessment & Plan    Acute on chronic, secondary to beer potomania  Nephrology input appreciated  Will fluid restrict further to 1500cc / day   Monitor Na level        Alcohol dependence (HCC)   Assessment & Plan    Continue vitamin supplementation  Continue replete electrolytes  Continue P  O  Ativan 2 5 mg p o  t i d   IV Ativan p r n  Psychiatry input appreciated, noted recommendations to start naltrexone 25 mg daily to help prevent return to heavy drinking after discharge  Plan to discharge to SNF, patient undergoing optioning process due to prior history of inpatient psychiatric hospitalizations  Patient does not meet criteria for involuntary commitment and is deemed capacitated to make healthcare decisions by Psychiatry         Seizure-like activity [facility])   Assessment & Plan    CT head reviewed  Prolactin is top-normal  Transitioned to p o  Keppra 1500 mg p o  b i d    Possibly related to alcohol withdrawal  Outpatient Neurology follow-up        Malnutrition of moderate degree ([address] )   Assessment & Plan    Malnutrition Findings:         Secondary to alcohol abuse    BMI Findings: Body mass index is 24 21 kg/m²  Consult nutrition        Dietary folate deficiency anemia   Assessment & Plan    GI input appreciated he will require outpatient follow-up for EGD and colonoscopy  Hemoglobin stable status post 1 unit PRBCs        Tobacco use   Assessment & Plan    -nicotine patch  -smoking cessation counseling          VTE Pharmacologic Prophylaxis:   Pharmacologic: Enoxaparin (Lovenox)  Mechanical VTE Prophylaxis in Place: Yes    Patient Centered Rounds: I have performed bedside rounds with nursing staff today  Discussions with Specialists or Other Care Team Provider: GI, Nephrology, Podiatry     Education and Discussions with Family / Patient: Patient declined    Time Spent for Care: 45 minutes  More than 50% of total time spent on counseling and coordination of care as described above  Current Length of Stay: 8 day(s)    Current Patient Status: Inpatient   Certification Statement: The patient will continue to require additional inpatient hospital stay due to need for close monitoring    Discharge Plan: TBD    Code Status: Level 1 - Full Code      Subjective:   Patient seen and examined  He is sleeping but easily aroused  A&O x3  C/o RLE pain and worsening erythema  Has no other complaints  Afebrile o/n  Denies SOB or chest pain, denies abdominal pain, nausea, vomiting, diarrhea or constipation  Objective:     Vitals:   Temp (24hrs), Av 4 °F (36 9 °C), Min:97 9 °F (36 6 °C), Max:99 9 °F (37 7 °C)    HR:  [84-96] 96  Resp:  [18-20] 18  BP: ()/(58-73) 93/58  SpO2:  [93 %-98 %] 98 %  Body mass index is 24 21 kg/m²  Input and Output Summary (last 24 hours):        Intake/Output Summary (Last 24 hours) at 18 1509  Last data filed at 18 1400   Gross per 24 hour   Intake             1980 ml   Output             2525 ml   Net             -545 ml       Physical Exam:     Physical Exam   Constitutional: He is oriented to person, place, and time  No distress  HENT:   Head: Normocephalic and atraumatic  Eyes: Scleral icterus is present  Neck: No JVD present  Cardiovascular: Normal rate and regular rhythm  No murmur heard  Pulmonary/Chest: Effort normal  No respiratory distress  He has no wheezes  He has no rales  Abdominal: Bowel sounds are normal  He exhibits distension  There is no tenderness  There is no guarding  Musculoskeletal: He exhibits no edema  Neurological: He is alert and oriented to person, place, and time  Skin: Skin is warm and dry  Psychiatric: He is withdrawn  Additional Data:     Labs:      Results from last 7 days  Lab Units 05/30/18  0526   WBC Thousand/uL 12 45*   HEMOGLOBIN g/dL 7 6*   HEMATOCRIT % 23 3*   PLATELETS Thousands/uL 248   NEUTROS PCT % 68   LYMPHS PCT % 17   MONOS PCT % 13*   EOS PCT % 1       Results from last 7 days  Lab Units 05/30/18  0526   SODIUM mmol/L 127*   POTASSIUM mmol/L 4 0   CHLORIDE mmol/L 93*   CO2 mmol/L 28   BUN mg/dL 7   CREATININE mg/dL 0 51*   CALCIUM mg/dL 7 7*   TOTAL PROTEIN g/dL 6 3*   BILIRUBIN TOTAL mg/dL 2 50*   ALK PHOS U/L 207*   ALT U/L 35   AST U/L 101*   GLUCOSE RANDOM mg/dL 76                     * I Have Reviewed All Lab Data Listed Above  * Additional Pertinent Lab Tests Reviewed:  Yamilka Lowry Admission Reviewed    Imaging:    Imaging Reports Reviewed Today Include: none today    Recent Cultures (last 7 days):           Last 24 Hours Medication List:     Current Facility-Administered Medications:  albuterol 2 5 mg Nebulization Q6H PRN Naila Ros, DO    cholecalciferol 1,000 Units Oral Daily Naila Ros, DO    cyanocobalamin 100 mcg Oral Daily Naila Ros, DO    enoxaparin 40 mg Subcutaneous Q24H Naila Ros, DO    folic acid 1 mg Oral Daily Addi Land, DO    gabapentin 300 mg Oral TID Addi Land, DO    levETIRAcetam 1,500 mg Oral Q12H [address] Jayce Martínez MD    levofloxacin 750 mg Intravenous Q24H Dima Fu MD    lisinopril 10 mg Oral Daily Addi Land, DO    LORazepam 1 mg Intravenous Q4H PRN Addi Land, DO    LORazepam 0 5 mg Oral TID Jayce Martínez MD    magnesium oxide 800 mg Oral BID Zoey Mejía MD    magnesium sulfate 2 g Intravenous Q2H Beckie Aguilera MD Last Rate: 2 g (05/30/18 1153)   multivitamin-minerals 1 tablet Oral Daily Addi Land, DO    nicotine 1 patch Transdermal Daily Td Salinas, DO    ondansetron 4 mg Intravenous Q6H PRN Addi Land, DO    oxyCODONE 5 mg Oral BID PRN Jayce Martínez MD    potassium-sodium phosphateS 1 tablet Oral 4x Daily (with meals and at bedtime) Jayce Martínez MD    thiamine 100 mg Oral Daily Addi Land, DO    vancomycin 20 mg/kg Intravenous Q12H Dima Fu MD    vancomycin 125 mg Oral Q12H Irish Farnsworth MD         Today, Patient Was Seen By: Kerrie Miller MD    ** Please Note: Dictation voice to text software may have been used in the creation of this document   **

## 2018-05-30 NOTE — ASSESSMENT & PLAN NOTE
Continue vitamin supplementation  Continue replete electrolytes  Continue P  O  Ativan 2 5 mg p o  t i d   IV Ativan p r n    Psychiatry input appreciated, noted recommendations to start naltrexone 25 mg daily to help prevent return to heavy drinking after discharge  Plan to discharge to SNF, patient undergoing optioning process due to prior history of inpatient psychiatric hospitalizations  Patient does not meet criteria for involuntary commitment and is deemed capacitated to make healthcare decisions by Psychiatry

## 2018-05-30 NOTE — SOCIAL WORK
Lanelle Canavan from [facility] will be out tomorrow afternoon to start OPTIONs assessment on pt

## 2018-05-30 NOTE — ASSESSMENT & PLAN NOTE
Malnutrition Findings:         Secondary to alcohol abuse    BMI Findings: Body mass index is 24 21 kg/m²     Consult nutrition

## 2018-05-30 NOTE — CONSULTS
Consultation - [address] [place] 47 y o  male MRN: [id number]  Unit/Bed#: 532-17 Encounter: [id number]    Assessment/Plan     Assessment:  1  Cellulitis RT leg/ankle - persistant  2  Dry stable eschar right medial ankle    Plan:  Although there are not clinically fluctuant areas indicating abscess, would consider a CT with contrast of right lower leg to include ankle prior to discharge to evaluate for abscess as patient has perisistant erythema and warmth to right leg and significant pain at ankle with weight bearing  CBC trended higher over past several days but currently stable, but if further regression would consider more broad antibiotic coverage as previous swab/superficial wound culture may not have been accurate  Wound is currently a superficial dry eschar with no drainage and no signs of deeper abscess at wound site apparent  Should be stable for transfer if CT is negative  History of Present Illness   HPI:  Anna Jansen is a 47 y o  male who presents with a wound located at right ankle  The wound has been present for likely over 2 2week(s)  The wound appears as if it was from edema from infection with some possible stasis component as well  Patient is poor historian  Pain currently to ankle with attempted WB per patient      Inpatient consult to Podiatry  Consult performed by: Cory Patel ordered by: Glenn Prescott          Review of Systems  10 point ROS reviewed, as per HPI otherwise non-contributory    Historical Information   Past Medical History:   Diagnosis Date    Alcohol abuse     Bowel obstruction ([address] )     Bowel perforation ([address] )     Cardiac disease     Continuous chronic alcoholism ([address] ) 10/5/2017    COPD (chronic obstructive pulmonary disease) ([address] )     History of shoulder surgery     Right shoulder    History of transfusion     Hx of cervical spine surgery     Hypertension     Incisional hernia 10/8/2017    MI, old     Mitral regurgitation     Psychiatric disorder     Seizures [facility])      Past Surgical History:   Procedure Laterality Date    BACK SURGERY      ESOPHAGOGASTRODUODENOSCOPY N/A 11/28/2016    Procedure: ESOPHAGOGASTRODUODENOSCOPY (EGD); Surgeon: Chase Tan MD;  Location:  GI LAB; Service:     LAPAROTOMY N/A 10/25/2016    Procedure: LAPAROTOMY EXPLORATORY;  Surgeon: Pat Flower MD;  Location: [facility];  Service:    [address] Right     SMALL INTESTINE SURGERY       Social History   History   Alcohol Use    3 6 oz/week    6 Cans of beer per week     Comment: 6 25oz cans a day     History   Drug Use No     History   Smoking Status    Current Every Day Smoker    Packs/day: 2 00    Years: 15 00   Smokeless Tobacco    Never Used     Family History: non-contributory    Meds/Allergies   all current active meds have been reviewed  No Known Allergies    Objective   Vitals: Blood pressure 113/68, pulse 86, temperature 97 9 °F (36 6 °C), temperature source Temporal, resp  rate 18, height 5' 5" (1 651 m), weight 66 kg (145 lb 8 1 oz), SpO2 94 %  Wounds:     Pressure Ulcer 05/24/18 Sacrum Mid blanchable reddened area (Active)   Staging Stage I 5/29/2018  8:24 AM   Wound Description Dry; Intact 5/29/2018  8:24 AM   Cece-wound Assessment Erythema;Fragile 5/29/2018  8:24 AM   Drainage Amount None 5/26/2018  7:45 PM   Treatment Offload; Turn & reposition 5/29/2018  8:24 AM   Dressing Open to air 5/29/2018  8:24 AM   Patient Tolerance Tolerated well 5/25/2018  8:00 AM   Dressing Status Open to air 5/29/2018  8:24 AM       Other Ulcers 05/25/18 Ankle Right; Inner (Active)   Wound Description WILLIAM 5/29/2018 12:00 AM   Cece-wound Assessment WILLIAM 5/27/2018 12:00 AM   Shape irregular 5/25/2018  9:00 AM   Wound Length (cm) 5 cm 5/25/2018  9:00 AM   Wound Width (cm) 5 cm 5/25/2018  9:00 AM   Wound Depth (cm) 0 1 5/25/2018  9:00 AM   Calculated Wound Volume (cm^3) 2 5 cm^3 5/25/2018  9:00 AM   Drainage Amount Small 5/25/2018 11:57 PM   Drainage Description Serous 5/25/2018 11:57 PM   Treatment Cleansed;Irrigation with NSS;Offload 5/26/2018  8:50 AM   Dressings Other (Comment) 5/26/2018  8:50 AM   Dressing Status Open to air 5/29/2018  8:00 PM         Physical Exam     Derm:  Dry eschar to right medial ankle, 5cm x 3cm x eschar  Adherent, no acute drainage noted  Prersistent erythema to right medial ankle and lower half of leg with some associate edema and increased local skin temperature  No palpable fluctuance that would indicate deep abscess  Vascular:  DP and PT pulses 1/4, CFT < 3 seconds to both feet  Ortho:  No deformities, MMT normal  Neuro:  Protective sensation is intact to both feet, no deficits  Lab Results: I have personally reviewed pertinent reports  and I have personally reviewed pertinent films in PACS  Imaging: I have personally reviewed pertinent reports  EKG, Pathology, and Other Studies: I have personally reviewed pertinent reports  Code Status: Level 1 - Full Code  Advance Directive and Living Will: Yes    Power of :    POLST:      Counseling / Coordination of Care  Total floor / unit time spent today 45 minutes  Greater than 50% of total time was spent with the patient and / or family counseling and / or coordination of care  A description of the counseling / coordination of care: Need for CT, continued antibiotics

## 2018-05-31 LAB
ALBUMIN SERPL BCP-MCNC: 1.8 G/DL (ref 3.5–5)
ALP SERPL-CCNC: 216 U/L (ref 46–116)
ALT SERPL W P-5'-P-CCNC: 36 U/L (ref 12–78)
ANION GAP SERPL CALCULATED.3IONS-SCNC: 7 MMOL/L (ref 4–13)
AST SERPL W P-5'-P-CCNC: 122 U/L (ref 5–45)
BASOPHILS # BLD AUTO: 0.14 THOUSANDS/ΜL (ref 0–0.1)
BASOPHILS NFR BLD AUTO: 1 % (ref 0–1)
BILIRUB SERPL-MCNC: 2.5 MG/DL (ref 0.2–1)
BUN SERPL-MCNC: 7 MG/DL (ref 5–25)
CALCIUM SERPL-MCNC: 7.4 MG/DL (ref 8.3–10.1)
CHLORIDE SERPL-SCNC: 95 MMOL/L (ref 100–108)
CO2 SERPL-SCNC: 27 MMOL/L (ref 21–32)
CREAT SERPL-MCNC: 0.54 MG/DL (ref 0.6–1.3)
EOSINOPHIL # BLD AUTO: 0.12 THOUSAND/ΜL (ref 0–0.61)
EOSINOPHIL NFR BLD AUTO: 1 % (ref 0–6)
ERYTHROCYTE [DISTWIDTH] IN BLOOD BY AUTOMATED COUNT: 20.2 % (ref 11.6–15.1)
GFR SERPL CREATININE-BSD FRML MDRD: 122 ML/MIN/1.73SQ M
GLUCOSE SERPL-MCNC: 80 MG/DL (ref 65–140)
HCT VFR BLD AUTO: 23.1 % (ref 36.5–49.3)
HGB BLD-MCNC: 7.5 G/DL (ref 12–17)
INR PPP: 1.3 (ref 0.86–1.17)
LYMPHOCYTES # BLD AUTO: 1.82 THOUSANDS/ΜL (ref 0.6–4.47)
LYMPHOCYTES NFR BLD AUTO: 15 % (ref 14–44)
MAGNESIUM SERPL-MCNC: 1.5 MG/DL (ref 1.6–2.6)
MCH RBC QN AUTO: 30.6 PG (ref 26.8–34.3)
MCHC RBC AUTO-ENTMCNC: 32.5 G/DL (ref 31.4–37.4)
MCV RBC AUTO: 94 FL (ref 82–98)
MONOCYTES # BLD AUTO: 1.63 THOUSAND/ΜL (ref 0.17–1.22)
MONOCYTES NFR BLD AUTO: 13 % (ref 4–12)
NEUTROPHILS # BLD AUTO: 8.59 THOUSANDS/ΜL (ref 1.85–7.62)
NEUTS SEG NFR BLD AUTO: 70 % (ref 43–75)
PLATELET # BLD AUTO: 267 THOUSANDS/UL (ref 149–390)
PMV BLD AUTO: 10.5 FL (ref 8.9–12.7)
POTASSIUM SERPL-SCNC: 4.6 MMOL/L (ref 3.5–5.3)
PROT SERPL-MCNC: 6.7 G/DL (ref 6.4–8.2)
PROTHROMBIN TIME: 15.6 SECONDS (ref 11.8–14.2)
RBC # BLD AUTO: 2.45 MILLION/UL (ref 3.88–5.62)
SODIUM SERPL-SCNC: 129 MMOL/L (ref 136–145)
WBC # BLD AUTO: 12.3 THOUSAND/UL (ref 4.31–10.16)

## 2018-05-31 PROCEDURE — 85610 PROTHROMBIN TIME: CPT | Performed by: FAMILY MEDICINE

## 2018-05-31 PROCEDURE — 99232 SBSQ HOSP IP/OBS MODERATE 35: CPT | Performed by: FAMILY MEDICINE

## 2018-05-31 PROCEDURE — 80053 COMPREHEN METABOLIC PANEL: CPT | Performed by: INTERNAL MEDICINE

## 2018-05-31 PROCEDURE — 97116 GAIT TRAINING THERAPY: CPT

## 2018-05-31 PROCEDURE — 97530 THERAPEUTIC ACTIVITIES: CPT

## 2018-05-31 PROCEDURE — 83735 ASSAY OF MAGNESIUM: CPT | Performed by: INTERNAL MEDICINE

## 2018-05-31 PROCEDURE — 82272 OCCULT BLD FECES 1-3 TESTS: CPT | Performed by: FAMILY MEDICINE

## 2018-05-31 PROCEDURE — 85025 COMPLETE CBC W/AUTO DIFF WBC: CPT | Performed by: INTERNAL MEDICINE

## 2018-05-31 RX ORDER — MAGNESIUM SULFATE HEPTAHYDRATE 40 MG/ML
2 INJECTION, SOLUTION INTRAVENOUS
Status: COMPLETED | OUTPATIENT
Start: 2018-05-31 | End: 2018-05-31

## 2018-05-31 RX ADMIN — THIAMINE HCL TAB 100 MG 100 MG: 100 TAB at 09:00

## 2018-05-31 RX ADMIN — DIBASIC SODIUM PHOSPHATE, MONOBASIC POTASSIUM PHOSPHATE AND MONOBASIC SODIUM PHOSPHATE 1 TABLET: 852; 155; 130 TABLET ORAL at 21:24

## 2018-05-31 RX ADMIN — LISINOPRIL 10 MG: 10 TABLET ORAL at 08:58

## 2018-05-31 RX ADMIN — MULTIPLE VITAMINS W/ MINERALS TAB 1 TABLET: TAB at 08:59

## 2018-05-31 RX ADMIN — ENOXAPARIN SODIUM 40 MG: 40 INJECTION, SOLUTION INTRAVENOUS; SUBCUTANEOUS at 21:24

## 2018-05-31 RX ADMIN — VANCOMYCIN HYDROCHLORIDE 1250 MG: 1 INJECTION, POWDER, LYOPHILIZED, FOR SOLUTION INTRAVENOUS at 18:16

## 2018-05-31 RX ADMIN — LORAZEPAM 0.5 MG: 0.5 TABLET ORAL at 20:17

## 2018-05-31 RX ADMIN — LEVETIRACETAM 1500 MG: 500 TABLET ORAL at 20:17

## 2018-05-31 RX ADMIN — GABAPENTIN 300 MG: 300 CAPSULE ORAL at 09:00

## 2018-05-31 RX ADMIN — FOLIC ACID 1 MG: 1 TABLET ORAL at 09:00

## 2018-05-31 RX ADMIN — LEVOFLOXACIN 750 MG: 5 INJECTION, SOLUTION INTRAVENOUS at 21:24

## 2018-05-31 RX ADMIN — VANCOMYCIN HYDROCHLORIDE 1250 MG: 1 INJECTION, POWDER, LYOPHILIZED, FOR SOLUTION INTRAVENOUS at 07:44

## 2018-05-31 RX ADMIN — VANCOMYCIN 125 MG: KIT at 09:01

## 2018-05-31 RX ADMIN — LORAZEPAM 0.5 MG: 0.5 TABLET ORAL at 09:00

## 2018-05-31 RX ADMIN — DIBASIC SODIUM PHOSPHATE, MONOBASIC POTASSIUM PHOSPHATE AND MONOBASIC SODIUM PHOSPHATE 1 TABLET: 852; 155; 130 TABLET ORAL at 14:45

## 2018-05-31 RX ADMIN — VITAM B12 100 MCG: 100 TAB at 09:00

## 2018-05-31 RX ADMIN — Medication 800 MG: at 08:58

## 2018-05-31 RX ADMIN — VITAMIN D, TAB 1000IU (100/BT) 1000 UNITS: 25 TAB at 09:00

## 2018-05-31 RX ADMIN — LORAZEPAM 0.5 MG: 0.5 TABLET ORAL at 14:45

## 2018-05-31 RX ADMIN — VANCOMYCIN 125 MG: KIT at 20:17

## 2018-05-31 RX ADMIN — MAGNESIUM SULFATE HEPTAHYDRATE 2 G: 40 INJECTION, SOLUTION INTRAVENOUS at 14:40

## 2018-05-31 RX ADMIN — GABAPENTIN 300 MG: 300 CAPSULE ORAL at 17:15

## 2018-05-31 RX ADMIN — DIBASIC SODIUM PHOSPHATE, MONOBASIC POTASSIUM PHOSPHATE AND MONOBASIC SODIUM PHOSPHATE 1 TABLET: 852; 155; 130 TABLET ORAL at 08:58

## 2018-05-31 RX ADMIN — DIBASIC SODIUM PHOSPHATE, MONOBASIC POTASSIUM PHOSPHATE AND MONOBASIC SODIUM PHOSPHATE 1 TABLET: 852; 155; 130 TABLET ORAL at 17:15

## 2018-05-31 RX ADMIN — Medication 800 MG: at 17:15

## 2018-05-31 RX ADMIN — GABAPENTIN 300 MG: 300 CAPSULE ORAL at 20:17

## 2018-05-31 RX ADMIN — NICOTINE 1 PATCH: 7 PATCH, EXTENDED RELEASE TRANSDERMAL at 09:01

## 2018-05-31 RX ADMIN — MAGNESIUM SULFATE HEPTAHYDRATE 2 G: 40 INJECTION, SOLUTION INTRAVENOUS at 09:48

## 2018-05-31 RX ADMIN — LEVETIRACETAM 1500 MG: 500 TABLET ORAL at 08:59

## 2018-05-31 NOTE — ASSESSMENT & PLAN NOTE
Continue vitamin supplementation  Continue to replete electrolytes  Continue P  O  Ativan 2 5 mg p o  t i d   IV Ativan p r n    Psychiatry input appreciated, noted recommendations to start naltrexone 25 mg daily to help prevent return to heavy drinking after discharge  Plan to discharge to SNF, patient undergoing optioning process due to prior history of inpatient psychiatric hospitalizations  Patient does not meet criteria for involuntary commitment and is deemed capacitated to make healthcare decisions by Psychiatry

## 2018-05-31 NOTE — PROGRESS NOTES
Progress Note - Nephrology   Giorgio Gold 47 y o  male MRN: [id number]  Unit/Bed#: 786-29 Encounter: [id number]    A/P:  1  Hyponatremia: His FeNa calculates to 0 52%  This may be due to cirrhosis, however, he did have diarrhea and was on diuretics indicating mild intravascular volume depletion prior to admission  He is correcting appropriately with fluid restriction and ice chips  He has marked hypoalbuminemia ia indicating decreased solute intake  So maynot correct to within normal range  2  Hypomagnesemia: due to alcoholism: Will replace with magnesium sulfate  He is receiving oral magnesium as well  3  Hypoalbuminemia: dietary intervention and cessation of alcohol  4  Cellulitis: on IV vancomycin and levofloxacin with improvement  5  Transaminitis: due to liver disease: will need outpatient GI followup  6: Essential hypertension: stable  7  Alcohol dependence: has been addressed  8  Anemia: normochromic, being followed      Follow up reason for today's visit: Hyponatremia    Cellulitis of right lower extremity    Patient Active Problem List   Diagnosis    Alcohol dependence ([facility] )    Tobacco use    Essential hypertension    COPD (chronic obstructive pulmonary disease) ([facility] )    H/O suicide attempt    H/O cervical spine surgery    Cholelithiasis    Hyponatremia    Dietary folate deficiency anemia    Hepatomegaly    Hepatic steatosis    Hypomagnesemia    Elevated alkaline phosphatase level    Alcoholic hepatitis without ascites    Vitamin D deficiency    Iron deficiency    Generalized weakness    Body mass index (BMI) 21 0-21 9, adult    Malnutrition of moderate degree (HCC)    Seizure (HCC)    Continuous chronic alcoholism (HCC)    Incisional hernia    Hx of seizure disorder    Seizure-like activity (HCC)    Diarrhea    Cellulitis of right lower extremity         Subjective:   Denies headache, dizziness, chest pain, dyspnea, abdominal pain, dysuria   Says heis breathing well Objective:     Vitals: Blood pressure 112/69, pulse 84, temperature 98 8 °F (37 1 °C), temperature source Temporal, resp  rate 16, height 5' 5" (1 651 m), weight 69 7 kg (153 lb 10 6 oz), SpO2 92 %  ,Body mass index is 25 57 kg/m²  Weight (last 2 days)     Date/Time   Weight    05/31/18 0600  69 7 (153 66)    05/30/18 0600  66 (145 5)    05/29/18 0600  69 7 (153 66)                Intake/Output Summary (Last 24 hours) at 05/31/18 0911  Last data filed at 05/31/18 0825   Gross per 24 hour   Intake             1510 ml   Output             1975 ml   Net             -465 ml     I/O last 3 completed shifts: In: 2650 [P O :2250; IV Piggyback:400]  Out: 0272 [Urine:3950]         Physical Exam: /69 (BP Location: Left arm)   Pulse 84   Temp 98 8 °F (37 1 °C) (Temporal)   Resp 16   Ht 5' 5" (1 651 m)   Wt 69 7 kg (153 lb 10 6 oz)   SpO2 92%   BMI 25 57 kg/m²     General Appearance:    Alert, cooperative, no distress, appears stated age   Head:    Normocephalic, without obvious abnormality, atraumatic   Eyes:    Scleral icterus   Ears:    Normal external ears   Nose:   Nares normal, septum midline, mucosa normal, no drainage    or sinus tenderness   Throat:   Lips, mucosa, and tongue normal; teeth and gums normal   Neck:   Supple, symmetrical, trachea midline, no adenopathy;        thyroid:  No enlargement/tenderness/nodules; no carotid    bruit or JVD   Back:     Symmetric, no curvature, ROM normal, no CVA tenderness   Lungs:     Decreased to auscultation bilaterally, respirations unlabored   Chest wall:    No tenderness or deformity   Heart:    Regular rate and rhythm, S1 and S2 normal, no murmur, rub   or gallop   Abdomen:     Soft, non-tender, bowel sounds active   Extremities:   Extremities no edema  Has cellulitis of RLE with mild improvement    No  edema   Skin:   Skin color as above - I no  lesions   Lymph nodes:   Cervical normal   Neurologic:   CNII-XII intact            Lab, Imaging and other studies: I have personally reviewed pertinent labs  CBC: Lab Results   Component Value Date    WBC 12 30 (H) 05/31/2018    HGB 7 5 (L) 05/31/2018    HCT 23 1 (L) 05/31/2018    MCV 94 05/31/2018     05/31/2018    MCH 30 6 05/31/2018    MCHC 32 5 05/31/2018    RDW 20 2 (H) 05/31/2018    MPV 10 5 05/31/2018     CMP: Lab Results   Component Value Date     (L) 05/31/2018    K 4 6 05/31/2018    CL 95 (L) 05/31/2018    CO2 27 05/31/2018    ANIONGAP 7 05/31/2018    BUN 7 05/31/2018    CREATININE 0 54 (L) 05/31/2018    GLUCOSE 80 05/31/2018    CALCIUM 7 4 (L) 05/31/2018     (H) 05/31/2018    ALT 36 05/31/2018    ALKPHOS 216 (H) 05/31/2018    PROT 6 7 05/31/2018    BILITOT 2 50 (H) 05/31/2018    EGFR 122 05/31/2018         Results from last 7 days  Lab Units 05/31/18  0431 05/30/18  0526 05/29/18  0533   SODIUM mmol/L 129* 127* 126*   POTASSIUM mmol/L 4 6 4 0 4 2   CHLORIDE mmol/L 95* 93* 94*   CO2 mmol/L 27 28 27   BUN mg/dL 7 7 7   CREATININE mg/dL 0 54* 0 51* 0 54*   CALCIUM mg/dL 7 4* 7 7* 7 7*   TOTAL PROTEIN g/dL 6 7 6 3* 6 3*   BILIRUBIN TOTAL mg/dL 2 50* 2 50* 2 90*   ALK PHOS U/L 216* 207* 222*   ALT U/L 36 35 37   AST U/L 122* 101* 106*   GLUCOSE RANDOM mg/dL 80 76 82         Phosphorus: No results found for: PHOS  Magnesium:   Lab Results   Component Value Date    MG 1 5 (L) 05/31/2018     Urinalysis: No results found for: Martir Pummel, SPECGRAV, PHUR, LEUKOCYTESUR, NITRITE, PROTEINUA, GLUCOSEU, KETONESU, BILIRUBINUR, BLOODU  Ionized Calcium: No results found for: CAION  Coagulation: Lab Results   Component Value Date    INR 1 30 (H) 05/31/2018     Troponin: No results found for: TROPONINI  ABG: No results found for: PHART, LUG8AJE, PO2ART, DQJ6QTR, R2YOCSDJ, BEART, SOURCE  Radiology review:     IMAGING  No results found      Current Facility-Administered Medications   Medication Dose Route Frequency    albuterol inhalation solution 2 5 mg  2 5 mg Nebulization Q6H PRN    cholecalciferol (VITAMIN D3) tablet 1,000 Units  1,000 Units Oral Daily    cyanocobalamin (VITAMIN B-12) tablet 100 mcg  100 mcg Oral Daily    diphenhydrAMINE (BENADRYL) injection 25 mg  25 mg Intravenous Q6H PRN    enoxaparin (LOVENOX) subcutaneous injection 40 mg  40 mg Subcutaneous P07D    folic acid (FOLVITE) tablet 1 mg  1 mg Oral Daily    gabapentin (NEURONTIN) capsule 300 mg  300 mg Oral TID    levETIRAcetam (KEPPRA) tablet 1,500 mg  1,500 mg Oral Q12H [facility]    levofloxacin (LEVAQUIN) IVPB (premix) 750 mg  750 mg Intravenous Q24H    lisinopril (ZESTRIL) tablet 10 mg  10 mg Oral Daily    LORazepam (ATIVAN) 2 mg/mL injection 1 mg  1 mg Intravenous Q4H PRN    LORazepam (ATIVAN) tablet 0 5 mg  0 5 mg Oral TID    magnesium oxide (MAG-OX) tablet 800 mg  800 mg Oral BID    multivitamin-minerals (CENTRUM) tablet 1 tablet  1 tablet Oral Daily    nicotine (NICODERM CQ) 7 mg/24hr TD 24 hr patch 1 patch  1 patch Transdermal Daily    ondansetron (ZOFRAN) injection 4 mg  4 mg Intravenous Q6H PRN    oxyCODONE (ROXICODONE) IR tablet 5 mg  5 mg Oral BID PRN    potassium-sodium phosphateS (K-PHOS,PHOSPHA 250) -250 mg tablet 1 tablet  1 tablet Oral 4x Daily (with meals and at bedtime)    thiamine (VITAMIN B1) tablet 100 mg  100 mg Oral Daily    vancomycin (VANCOCIN) 1,250 mg in sodium chloride 0 9 % 250 mL IVPB  20 mg/kg Intravenous Q12H    vancomycin (VANCOCIN) oral solution 125 mg  125 mg Oral Q12H [facility]     Medications Discontinued During This Encounter   Medication Reason    Magnesium Oxide 400 MG CAPS     thiamine 100 MG tablet     folic acid (FOLVITE) tablet 1 mg Duplicate order    multivitamin stress formula tablet 1 tablet     LORazepam (ATIVAN) 2 mg/mL injection 0 5 mg     LORazepam (ATIVAN) 2 mg/mL injection 1 mg     levETIRAcetam (KEPPRA) 1,500 mg in sodium chloride 0 9 % 100 mL IVPB     magnesium sulfate 4 g/100 mL IVPB (premix) 4 g Alternate therapy    LORazepam (ATIVAN) 2 mg/mL injection 1 mg     potassium chloride (K-DUR,KLOR-CON) CR tablet 40 mEq     HYDROmorphone (DILAUDID) injection 0 5 mg     oxyCODONE (ROXICODONE) IR tablet 5 mg     magnesium sulfate 4 g/100 mL IVPB (premix) 4 g     magnesium sulfate 2 g/50 mL IVPB (premix) 2 g     sodium chloride 0 9 % infusion     LORazepam (ATIVAN) tablet 1 mg     magnesium oxide (MAG-OX) tablet 400 mg     ceFAZolin (ANCEF) IVPB (premix) 1,000 mg        Vahe Davis MD

## 2018-05-31 NOTE — ASSESSMENT & PLAN NOTE
Acute on chronic, secondary to beer potomania  Nephrology input appreciated  Continue fluid restriction of 1500 cc per 24 hours  Monitor Na level

## 2018-05-31 NOTE — ASSESSMENT & PLAN NOTE
Secondary to alcohol abuse and degree of malnutrition  Mg improved to 1 5 today  Nephrology input appreciated  Continue with replacement

## 2018-05-31 NOTE — ASSESSMENT & PLAN NOTE
GI input appreciated he will require outpatient follow-up for EGD and colonoscopy  Hemoglobin dropped to 7 5, gradually dropping slowly after 1 u PRBC transfusion at time of admission  Continue folate replacement  Check CBC in am  Hem occult stool  Will ask GI to re-evaluate tomorrow

## 2018-05-31 NOTE — PROGRESS NOTES
Progress Note - Ludin Cruz 1965, 47 y o  male MRN: [id number]    Unit/Bed#: 422-01 Encounter: [id number]    Primary Care Provider: Uriel Weaver PA-C   Date and time admitted to hospital: 5/22/2018  3:08 PM        * Cellulitis of right lower extremity   Assessment & Plan    Appears similar to yesterday distribution, continue Vanco/Levaquin, along with PO Vanco for c diff prophylaxis  Wound culture growing Staph that is sensitive to Ancef - patient received 1 week of therapy - but may have been insufficient wound cultures given worsening erythema  Podiatry input appreciated        Alcoholic hepatitis without ascites   Assessment & Plan    Stable  GI evaluated, patient not candidate for steroids  Continue to monitor LFTs, check ammonia level and INR  Counseling on total alcohol cessation        Hypomagnesemia   Assessment & Plan    Secondary to alcohol abuse and degree of malnutrition  Mg improved to 1 5 today  Nephrology input appreciated  Continue with replacement          Hyponatremia   Assessment & Plan    Acute on chronic, secondary to beer potomania  Nephrology input appreciated  Continue fluid restriction of 1500 cc per 24 hours  Monitor Na level        Alcohol dependence ([facility] [address] )   Assessment & Plan    Continue vitamin supplementation  Continue to replete electrolytes  Continue P  O  Ativan 2 5 mg p o  t i d   IV Ativan p r n  Psychiatry input appreciated, noted recommendations to start naltrexone 25 mg daily to help prevent return to heavy drinking after discharge  Plan to discharge to SNF, patient undergoing optioning process due to prior history of inpatient psychiatric hospitalizations  Patient does not meet criteria for involuntary commitment and is deemed capacitated to make healthcare decisions by Psychiatry         Seizure-like activity [facility])   Assessment & Plan    CT head reviewed  Prolactin is top-normal  Transitioned to p o  Keppra 1500 mg p o  b i d    Possibly related to alcohol withdrawal  Outpatient Neurology follow-up        Malnutrition of moderate degree ([facility] [address] )   Assessment & Plan    Malnutrition Findings:         Secondary to alcohol abuse    BMI Findings: Body mass index is 25 57 kg/m²  Consult nutrition        Dietary folate deficiency anemia   Assessment & Plan    GI input appreciated he will require outpatient follow-up for EGD and colonoscopy  Hemoglobin dropped to 7 5, gradually dropping slowly after 1 u PRBC transfusion at time of admission  Continue folate replacement  Check CBC in am  Hem occult stool  Will ask GI to re-evaluate tomorrow        Tobacco use   Assessment & Plan    -nicotine patch  -smoking cessation counseling          VTE Pharmacologic Prophylaxis:   Pharmacologic: Enoxaparin (Lovenox)  Mechanical VTE Prophylaxis in Place: Yes    Patient Centered Rounds: I have performed bedside rounds with nursing staff today  Discussions with Specialists or Other Care Team Provider: Nephrology    Education and Discussions with Family / Patient: Patient, he declined that I call his ex wife    Time Spent for Care: 30 minutes  More than 50% of total time spent on counseling and coordination of care as described above  Current Length of Stay: 9 day(s)    Current Patient Status: Inpatient   Certification Statement: The patient will continue to require additional inpatient hospital stay due to need for close monitoring, daily labs, "optioning process"    Discharge Plan: TBD    Code Status: Level 1 - Full Code      Subjective:   Patient seen and examined  He states that he feels well  He states that his lower extremity erythema is about the same, denies significant associated pain  Has been afebrile  Reports difficulty tolerating fluid restriction but agreeable to continue for now  Denies nausea vomiting abdominal pain, denies diarrhea or constipation  Denies shortness of breath or chest pain      Objective:     Vitals:   Temp (24hrs), Av 7 °F (37 1 °C), Min:98 3 °F (36 8 °C), Max:99 °F (37 2 °C)    HR:  [84-99] 88  Resp:  [14-16] 16  BP: (103-122)/(61-83) 106/61  SpO2:  [92 %-98 %] 98 %  Body mass index is 25 57 kg/m²  Input and Output Summary (last 24 hours): Intake/Output Summary (Last 24 hours) at 05/31/18 1630  Last data filed at 05/31/18 1300   Gross per 24 hour   Intake             1210 ml   Output             2090 ml   Net             -880 ml       Physical Exam:     Physical Exam   Constitutional: He is oriented to person, place, and time  No distress  HENT:   Head: Normocephalic and atraumatic  Eyes: Conjunctivae are normal    Neck: No JVD present  Cardiovascular: Normal rate and regular rhythm  No murmur heard  Pulmonary/Chest: Effort normal  No respiratory distress  He has no wheezes  He has no rales  Abdominal: Bowel sounds are normal  He exhibits distension  There is no tenderness  There is no guarding  Musculoskeletal: He exhibits no edema  Neurological: He is alert and oriented to person, place, and time  Skin: Skin is warm and dry  R shin erythema   Psychiatric: He has a normal mood and affect  Additional Data:     Labs:      Results from last 7 days  Lab Units 05/31/18  0431   WBC Thousand/uL 12 30*   HEMOGLOBIN g/dL 7 5*   HEMATOCRIT % 23 1*   PLATELETS Thousands/uL 267   NEUTROS PCT % 70   LYMPHS PCT % 15   MONOS PCT % 13*   EOS PCT % 1       Results from last 7 days  Lab Units 05/31/18  0431   SODIUM mmol/L 129*   POTASSIUM mmol/L 4 6   CHLORIDE mmol/L 95*   CO2 mmol/L 27   BUN mg/dL 7   CREATININE mg/dL 0 54*   CALCIUM mg/dL 7 4*   TOTAL PROTEIN g/dL 6 7   BILIRUBIN TOTAL mg/dL 2 50*   ALK PHOS U/L 216*   ALT U/L 36   AST U/L 122*   GLUCOSE RANDOM mg/dL 80       Results from last 7 days  Lab Units 05/31/18  0431   INR  1 30*                 * I Have Reviewed All Lab Data Listed Above  * Additional Pertinent Lab Tests Reviewed:  [address] Admission Reviewed    Imaging:    Imaging Reports Reviewed Today Include: None today      Recent Cultures (last 7 days):           Last 24 Hours Medication List:     Current Facility-Administered Medications:  albuterol 2 5 mg Nebulization Q6H PRN Kimberly Haring, DO    cholecalciferol 1,000 Units Oral Daily Kimberly Haring, DO    cyanocobalamin 100 mcg Oral Daily Kimberly Haring, DO    diphenhydrAMINE 25 mg Intravenous Q6H PRN Kristian Eric MD    enoxaparin 40 mg Subcutaneous Q24H Kimberly Haring, DO    folic acid 1 mg Oral Daily Kimberly Haring, DO    gabapentin 300 mg Oral TID Kimberly Haring, DO    levETIRAcetam 1,500 mg Oral Q12H [address] Brenda Peterson MD    levofloxacin 750 mg Intravenous Q24H Kristian Eric MD Last Rate: Stopped (05/30/18 0389)   lisinopril 10 mg Oral Daily Kimberly Haring, DO    LORazepam 1 mg Intravenous Q4H [facility]N [facility]    LORazepam 0 5 mg Oral TID Brenda Peterson MD    magnesium oxide 800 mg Oral BID Zoey Mejía MD    magnesium sulfate 2 g Intravenous Q2H Celina Badillo MD    multivitamin-minerals 1 tablet Oral Daily Kimberly Haring, DO    nicotine 1 patch Transdermal Daily Kimberly Haring, DO    ondansetron 4 mg Intravenous Q6H PRN Kimberly Haring, DO    oxyCODONE 5 mg Oral BID PRN Brenda Peterson MD    potassium-sodium phosphateS 1 tablet Oral 4x Daily (with meals and at bedtime) Brenda Peterson MD    thiamine 100 mg Oral Daily Kimberly Haring, DO    vancomycin 20 mg/kg Intravenous Q12H Kristian Eric MD Last Rate: Stopped (05/31/18 8539)   vancomycin 125 mg Oral Q12H Sissy Cai MD         Today, Patient Was Seen By: Lakisha Huang MD    ** Please Note: Dictation voice to text software may have been used in the creation of this document   **

## 2018-05-31 NOTE — ASSESSMENT & PLAN NOTE
Appears similar to yesterday distribution, continue Vanco/Levaquin, along with PO Vanco for c diff prophylaxis  Wound culture growing Staph that is sensitive to Ancef - patient received 1 week of therapy - but may have been insufficient wound cultures given worsening erythema  Podiatry input appreciated

## 2018-05-31 NOTE — PHYSICAL THERAPY NOTE
PT Treatment note      05/31/18 1128   Subjective   Subjective Agreeable to therapy  Refused OOB in chair, States it's uncomfortable  (Needs encouragment to participate)   Bed Mobility   Supine to Sit 5  Supervision   Additional items Bedrails   Sit to Supine 5  Supervision   Additional items Bedrails   Transfers   Sit to Stand 5  Supervision   Additional items Bedrails   Stand to Sit 5  Supervision   Stand pivot 5  Supervision   Ambulation/Elevation   Gait Assistance 5  Supervision   Additional items Assist x 1   Assistive Device Rolling walker   Distance 85'   Balance   Static Sitting Good   Dynamic Sitting Fair +   Static Standing Fair   Dynamic Standing Fair   Ambulatory Fair   Endurance Deficit   Endurance Deficit Yes   Activity Tolerance   Activity Tolerance Patient limited by fatigue   Assessment   Prognosis Good   Problem List Decreased strength;Decreased endurance; Impaired balance;Decreased mobility   Assessment Functional mobility with RW and supervison  Tolerated increased distance  Pt is in need of continued activity in PT to improve impairments and functional deficits  Plan   Treatment/Interventions Functional transfer training;LE strengthening/ROM; Therapeutic exercise; Endurance training;Bed mobility;Gait training   Progress Progressing toward goals   Recommendation   Recommendation Short-term skilled PT   Pt  In bed with call bell within reach at end of PT session

## 2018-06-01 ENCOUNTER — APPOINTMENT (INPATIENT)
Dept: ULTRASOUND IMAGING | Facility: HOSPITAL | Age: 53
DRG: 364 | End: 2018-06-01
Payer: COMMERCIAL

## 2018-06-01 ENCOUNTER — APPOINTMENT (INPATIENT)
Dept: CT IMAGING | Facility: HOSPITAL | Age: 53
DRG: 364 | End: 2018-06-01
Payer: COMMERCIAL

## 2018-06-01 LAB
ALBUMIN SERPL BCP-MCNC: 1.8 G/DL (ref 3.5–5)
ALP SERPL-CCNC: 208 U/L (ref 46–116)
ALT SERPL W P-5'-P-CCNC: 37 U/L (ref 12–78)
ANION GAP SERPL CALCULATED.3IONS-SCNC: 5 MMOL/L (ref 4–13)
AST SERPL W P-5'-P-CCNC: 103 U/L (ref 5–45)
BASOPHILS # BLD AUTO: 0.13 THOUSANDS/ΜL (ref 0–0.1)
BASOPHILS NFR BLD AUTO: 1 % (ref 0–1)
BILIRUB SERPL-MCNC: 2.1 MG/DL (ref 0.2–1)
BUN SERPL-MCNC: 8 MG/DL (ref 5–25)
CALCIUM SERPL-MCNC: 7.8 MG/DL (ref 8.3–10.1)
CHLORIDE SERPL-SCNC: 94 MMOL/L (ref 100–108)
CO2 SERPL-SCNC: 26 MMOL/L (ref 21–32)
CREAT SERPL-MCNC: 0.55 MG/DL (ref 0.6–1.3)
EOSINOPHIL # BLD AUTO: 0.17 THOUSAND/ΜL (ref 0–0.61)
EOSINOPHIL NFR BLD AUTO: 1 % (ref 0–6)
ERYTHROCYTE [DISTWIDTH] IN BLOOD BY AUTOMATED COUNT: 20.2 % (ref 11.6–15.1)
GFR SERPL CREATININE-BSD FRML MDRD: 121 ML/MIN/1.73SQ M
GLUCOSE SERPL-MCNC: 85 MG/DL (ref 65–140)
HCT VFR BLD AUTO: 23.4 % (ref 36.5–49.3)
HGB BLD-MCNC: 7.5 G/DL (ref 12–17)
LYMPHOCYTES # BLD AUTO: 1.91 THOUSANDS/ΜL (ref 0.6–4.47)
LYMPHOCYTES NFR BLD AUTO: 16 % (ref 14–44)
MAGNESIUM SERPL-MCNC: 1.2 MG/DL (ref 1.6–2.6)
MCH RBC QN AUTO: 30.2 PG (ref 26.8–34.3)
MCHC RBC AUTO-ENTMCNC: 32.1 G/DL (ref 31.4–37.4)
MCV RBC AUTO: 94 FL (ref 82–98)
MONOCYTES # BLD AUTO: 1.51 THOUSAND/ΜL (ref 0.17–1.22)
MONOCYTES NFR BLD AUTO: 12 % (ref 4–12)
NEUTROPHILS # BLD AUTO: 8.6 THOUSANDS/ΜL (ref 1.85–7.62)
NEUTS SEG NFR BLD AUTO: 70 % (ref 43–75)
PHOSPHATE SERPL-MCNC: 3.9 MG/DL (ref 2.7–4.5)
PLATELET # BLD AUTO: 265 THOUSANDS/UL (ref 149–390)
PMV BLD AUTO: 10.2 FL (ref 8.9–12.7)
POTASSIUM SERPL-SCNC: 4.1 MMOL/L (ref 3.5–5.3)
PROT SERPL-MCNC: 6.7 G/DL (ref 6.4–8.2)
RBC # BLD AUTO: 2.48 MILLION/UL (ref 3.88–5.62)
SODIUM SERPL-SCNC: 125 MMOL/L (ref 136–145)
T3 SERPL-MCNC: 0.8 NG/ML (ref 0.6–1.8)
VANCOMYCIN TROUGH SERPL-MCNC: 9 UG/ML (ref 10–20)
WBC # BLD AUTO: 12.32 THOUSAND/UL (ref 4.31–10.16)

## 2018-06-01 PROCEDURE — 99232 SBSQ HOSP IP/OBS MODERATE 35: CPT | Performed by: INTERNAL MEDICINE

## 2018-06-01 PROCEDURE — 80202 ASSAY OF VANCOMYCIN: CPT | Performed by: FAMILY MEDICINE

## 2018-06-01 PROCEDURE — 83735 ASSAY OF MAGNESIUM: CPT | Performed by: INTERNAL MEDICINE

## 2018-06-01 PROCEDURE — 80053 COMPREHEN METABOLIC PANEL: CPT | Performed by: INTERNAL MEDICINE

## 2018-06-01 PROCEDURE — 93971 EXTREMITY STUDY: CPT

## 2018-06-01 PROCEDURE — 73701 CT LOWER EXTREMITY W/DYE: CPT

## 2018-06-01 PROCEDURE — 84100 ASSAY OF PHOSPHORUS: CPT | Performed by: INTERNAL MEDICINE

## 2018-06-01 PROCEDURE — 99233 SBSQ HOSP IP/OBS HIGH 50: CPT | Performed by: FAMILY MEDICINE

## 2018-06-01 PROCEDURE — 84480 ASSAY TRIIODOTHYRONINE (T3): CPT | Performed by: FAMILY MEDICINE

## 2018-06-01 PROCEDURE — 85025 COMPLETE CBC W/AUTO DIFF WBC: CPT | Performed by: INTERNAL MEDICINE

## 2018-06-01 RX ORDER — MAGNESIUM SULFATE HEPTAHYDRATE 40 MG/ML
2 INJECTION, SOLUTION INTRAVENOUS
Status: COMPLETED | OUTPATIENT
Start: 2018-06-01 | End: 2018-06-01

## 2018-06-01 RX ADMIN — MAGNESIUM SULFATE HEPTAHYDRATE 2 G: 40 INJECTION, SOLUTION INTRAVENOUS at 10:42

## 2018-06-01 RX ADMIN — OXYCODONE HYDROCHLORIDE 5 MG: 5 TABLET ORAL at 18:04

## 2018-06-01 RX ADMIN — VANCOMYCIN 125 MG: KIT at 22:08

## 2018-06-01 RX ADMIN — DIBASIC SODIUM PHOSPHATE, MONOBASIC POTASSIUM PHOSPHATE AND MONOBASIC SODIUM PHOSPHATE 1 TABLET: 852; 155; 130 TABLET ORAL at 12:32

## 2018-06-01 RX ADMIN — MAGNESIUM SULFATE HEPTAHYDRATE 2 G: 40 INJECTION, SOLUTION INTRAVENOUS at 12:32

## 2018-06-01 RX ADMIN — THIAMINE HCL TAB 100 MG 100 MG: 100 TAB at 09:11

## 2018-06-01 RX ADMIN — LEVETIRACETAM 1500 MG: 500 TABLET ORAL at 22:08

## 2018-06-01 RX ADMIN — LISINOPRIL 10 MG: 10 TABLET ORAL at 09:10

## 2018-06-01 RX ADMIN — GABAPENTIN 300 MG: 300 CAPSULE ORAL at 09:10

## 2018-06-01 RX ADMIN — DIBASIC SODIUM PHOSPHATE, MONOBASIC POTASSIUM PHOSPHATE AND MONOBASIC SODIUM PHOSPHATE 1 TABLET: 852; 155; 130 TABLET ORAL at 09:10

## 2018-06-01 RX ADMIN — VANCOMYCIN HYDROCHLORIDE 1500 MG: 1 INJECTION, POWDER, LYOPHILIZED, FOR SOLUTION INTRAVENOUS at 18:06

## 2018-06-01 RX ADMIN — LORAZEPAM 0.5 MG: 0.5 TABLET ORAL at 14:32

## 2018-06-01 RX ADMIN — LEVOFLOXACIN 750 MG: 5 INJECTION, SOLUTION INTRAVENOUS at 22:08

## 2018-06-01 RX ADMIN — VANCOMYCIN HYDROCHLORIDE 1250 MG: 1 INJECTION, POWDER, LYOPHILIZED, FOR SOLUTION INTRAVENOUS at 06:18

## 2018-06-01 RX ADMIN — NICOTINE 1 PATCH: 7 PATCH, EXTENDED RELEASE TRANSDERMAL at 09:12

## 2018-06-01 RX ADMIN — DIBASIC SODIUM PHOSPHATE, MONOBASIC POTASSIUM PHOSPHATE AND MONOBASIC SODIUM PHOSPHATE 1 TABLET: 852; 155; 130 TABLET ORAL at 22:08

## 2018-06-01 RX ADMIN — IOHEXOL 100 ML: 350 INJECTION, SOLUTION INTRAVENOUS at 20:19

## 2018-06-01 RX ADMIN — VITAMIN D, TAB 1000IU (100/BT) 1000 UNITS: 25 TAB at 09:10

## 2018-06-01 RX ADMIN — VANCOMYCIN 125 MG: KIT at 09:12

## 2018-06-01 RX ADMIN — Medication 800 MG: at 18:04

## 2018-06-01 RX ADMIN — GABAPENTIN 300 MG: 300 CAPSULE ORAL at 18:04

## 2018-06-01 RX ADMIN — FOLIC ACID 1 MG: 1 TABLET ORAL at 09:10

## 2018-06-01 RX ADMIN — DIBASIC SODIUM PHOSPHATE, MONOBASIC POTASSIUM PHOSPHATE AND MONOBASIC SODIUM PHOSPHATE 1 TABLET: 852; 155; 130 TABLET ORAL at 18:04

## 2018-06-01 RX ADMIN — LORAZEPAM 0.5 MG: 0.5 TABLET ORAL at 09:11

## 2018-06-01 RX ADMIN — MULTIPLE VITAMINS W/ MINERALS TAB 1 TABLET: TAB at 09:11

## 2018-06-01 RX ADMIN — VITAM B12 100 MCG: 100 TAB at 09:11

## 2018-06-01 RX ADMIN — LEVETIRACETAM 1500 MG: 500 TABLET ORAL at 09:10

## 2018-06-01 RX ADMIN — GABAPENTIN 300 MG: 300 CAPSULE ORAL at 22:08

## 2018-06-01 RX ADMIN — LORAZEPAM 0.5 MG: 0.5 TABLET ORAL at 22:08

## 2018-06-01 RX ADMIN — Medication 800 MG: at 09:10

## 2018-06-01 RX ADMIN — ENOXAPARIN SODIUM 40 MG: 40 INJECTION, SOLUTION INTRAVENOUS; SUBCUTANEOUS at 22:08

## 2018-06-01 RX ADMIN — MAGNESIUM SULFATE HEPTAHYDRATE 2 G: 40 INJECTION, SOLUTION INTRAVENOUS at 15:05

## 2018-06-01 NOTE — ASSESSMENT & PLAN NOTE
No recurrence since admission   CT head reviewed  Prolactin is top-normal  Transitioned to p o  Keppra 1500 mg p o  b i d    Possibly related to alcohol withdrawal  Outpatient Neurology follow-up

## 2018-06-01 NOTE — CASE MANAGEMENT
Continued Stay Review    Date:    6/1/2018    Vital Signs: /77 (BP Location: Left arm)   Pulse 87   Temp 98 8 °F (37 1 °C) (Temporal)   Resp 18   Ht 5' 5" (1 651 m)   Wt 68 7 kg (151 lb 7 3 oz)   SpO2 98%   BMI 25 20 kg/m²     Medications:   Scheduled Meds:   Current Facility-Administered Medications:  albuterol 2 5 mg Nebulization Q6H PRN Juan [employer], DO    cholecalciferol 1,000 Units Oral Daily Juan [employer], DO    cyanocobalamin 100 mcg Oral Daily [employer], DO    diphenhydrAMINE 25 mg Intravenous Q6H PRN Daphne Novak MD    enoxaparin 40 mg Subcutaneous Q24H [employer], DO    folic acid 1 mg Oral Daily Juan [employer], DO    gabapentin 300 mg Oral TID [employer], DO    levETIRAcetam 1,500 mg Oral Q12H [address] Lucien Tucker MD    levofloxacin 750 mg Intravenous Q24H Daphne Novak MD Last Rate: 750 mg (05/31/18 2124)   lisinopril 10 mg Oral Daily Juan [employer], DO    LORazepam 1 mg Intravenous Q4H PRN Juan [employer], DO    LORazepam 0 5 mg Oral TID Lucien Tucker MD    magnesium oxide 800 mg Oral BID Zoey Mejía MD    magnesium sulfate 2 g Intravenous Q2H Meme Ballard MD    multivitamin-minerals 1 tablet Oral Daily Juan [employer], DO    nicotine 1 patch Transdermal Daily [employer], DO    ondansetron 4 mg Intravenous Q6H PRN Juan [employer], DO    oxyCODONE 5 mg Oral BID PRN Lucien Tucker MD    potassium-sodium phosphateS 1 tablet Oral 4x Daily (with meals and at bedtime) Lucien Tucker MD    thiamine 100 mg Oral Daily Juan [employer], DO    vancomycin 1,500 mg Intravenous Q12H Daphne Novak MD    vancomycin 125 mg Oral Q12H [address] Daphne Novak MD      Continuous Infusions:    PRN Meds:   albuterol    diphenhydrAMINE    LORazepam    ondansetron    oxyCODONE    Abnormal Labs/Diagnostic Results:   WBC   12/32  H/H   7 5/23 4  NA  125  Alk phos  208  Total  Bili  2 10  AST  103  Creat   0 55    Age/Sex: 47 y o  male     Assessment/Plan:   Cellulitis of right lower extremity   Assessment & Plan     Appears similar to yesterday distribution, continue Vanco/Levaquin, along with PO Vanco for c diff prophylaxis  Will d/w ID due to persistence of erythema, warmth and tenderness  Wound culture growing Staph that is sensitive to Ancef - patient received 1 week of therapy - but may have been insufficient wound cultures given worsening erythema  Podiatry input appreciated  Will also check venous duplex right lower extremity to rule out DVT given persistence of pain and erythema despite multiple antibiotics          Alcoholic hepatitis without ascites   Assessment & Plan     Stable  GI evaluated, patient not candidate for steroids  Continue to monitor LFTs, check ammonia level and INR  Counseling on total alcohol cessation     Hypomagnesemia   Assessment & Plan     Secondary to alcohol abuse and degree of malnutrition  Mg again dropped to 1 2  Nephrology input appreciated  Continue with replacement  Monitor Mg daily             Hyponatremia   Assessment & Plan     Sodium again dropped to 125 today, patient remains asymptomatic  Acute on chronic, secondary to beer potomania  Nephrology input appreciated  Continue fluid restriction of 1500 cc per 24 hours  Monitor Na level daily     Alcohol dependence (HCC)   Assessment & Plan     Continue vitamin supplementation  Continue to replete electrolytes  Continue P  O  Ativan 2 5 mg p o  t i d   IV Ativan p r n    Psychiatry input appreciated, noted recommendations to start naltrexone 25 mg daily to help prevent return to heavy drinking after discharge  Plan to discharge to SNF, patient undergoing optioning process due to prior history of inpatient psychiatric hospitalizations  Patient does not meet criteria for involuntary commitment and is deemed capacitated to make healthcare decisions by Psychiatry         Seizure-like activity [facility])   Assessment & Plan     No recurrence since admission   CT head reviewed  Prolactin is top-normal  Transitioned to p o  Keppra 1500 mg p o  b i d  Possibly related to alcohol withdrawal  Outpatient Neurology follow-up          Malnutrition of moderate degree (HCC)     Dietary folate deficiency anemia   Assessment & Plan     GI input appreciated he will require outpatient follow-up for EGD and colonoscopy  Hemoglobin dropped to 7 5, and stable, had 1 u PRBC transfusion at time of admission  Continue folate replacement  Monitor  Hem occult stool  Discussed with GI, as H&H has been stable and due to marked hyponatremia no urgent procedures are recommended          Tobacco use   Assessment & Plan     -nicotine patch  -smoking cessation counseling     The patient will continue to require additional inpatient hospital stay due to need for close monitroing, antibiotic adjustment        Discharge Plan:   SNF    Thank you,  [facility] by Honorio Stafford for 2017  Network Utilization Review Department  Phone: [phone]; Fax [phone]  ATTENTION: The Network Utilization Review Department is now centralized for our 7 Facilities  Make a note that we have a new phone and fax numbers for our Department  Please call with any questions or concerns to [phone] and carefully follow the prompts so that you are directed to the right person  All voicemails are confidential  Fax any determinations, approvals, denials, and requests for initial or continue stay review clinical to [phone]  Due to HIGH CALL volume, it would be easier if you could please send faxed requests to expedite your requests and in part, help us provide discharge notifications faster

## 2018-06-01 NOTE — ASSESSMENT & PLAN NOTE
Sodium again dropped to 125 today, patient remains asymptomatic  Acute on chronic, secondary to beer potramoneia  Nephrology input appreciated  Continue fluid restriction of 1500 cc per 24 hours  Monitor Na level daily

## 2018-06-01 NOTE — PROGRESS NOTES
Progress Note - Para Chough 47 y o  male MRN: [id number]    Unit/Bed#: 935-05 Encounter: [id number]        Subjective:     Doing well without complaints  No evidence of bleeding  Tolerating a diet  Objective:     Vitals: Blood pressure 130/77, pulse 87, temperature 98 8 °F (37 1 °C), temperature source Temporal, resp  rate 18, height 5' 5" (1 651 m), weight 68 7 kg (151 lb 7 3 oz), SpO2 98 %  ,Body mass index is 25 2 kg/m²  Intake/Output Summary (Last 24 hours) at 06/01/18 1706  Last data filed at 06/01/18 1530   Gross per 24 hour   Intake              720 ml   Output             3300 ml   Net            -2580 ml       Physical Exam:     General Appearance: Alert, appears stated age and cooperative  Lungs: Clear to auscultation bilaterally, no rales or rhonchi, no labored breathing/accessory muscle use  Heart: Regular rate and rhythm, S1, S2 normal, no murmur, click, rub or gallop  Abdomen: Soft, non-tender, non-distended; bowel sounds normal; no masses or no organomegaly  Extremities:  Erythema and edema of the Right lower extremity    Invasive Devices     Peripheral Intravenous Line            Peripheral IV 06/01/18 Right Wrist less than 1 day                Lab Results:      Results from last 7 days  Lab Units 06/01/18  0632   WBC Thousand/uL 12 32*   HEMOGLOBIN g/dL 7 5*   HEMATOCRIT % 23 4*   PLATELETS Thousands/uL 265   NEUTROS PCT % 70   LYMPHS PCT % 16   MONOS PCT % 12   EOS PCT % 1       Results from last 7 days  Lab Units 06/01/18  0632   SODIUM mmol/L 125*   POTASSIUM mmol/L 4 1   CHLORIDE mmol/L 94*   CO2 mmol/L 26   BUN mg/dL 8   CREATININE mg/dL 0 55*   CALCIUM mg/dL 7 8*   TOTAL PROTEIN g/dL 6 7   BILIRUBIN TOTAL mg/dL 2 10*   ALK PHOS U/L 208*   ALT U/L 37   AST U/L 103*   GLUCOSE RANDOM mg/dL 85       Results from last 7 days  Lab Units 05/31/18  0431   INR  1 30*           Imaging Studies: I have personally reviewed pertinent imaging studies        Ct Head Wo Contrast    Result Date: 5/22/2018  Impression: No acute intracranial hemorrhage, midline shift, or mass effect  Workstation performed: [id number]     Ct Abdomen Pelvis W Contrast    Result Date: 5/22/2018  Impression: Stable calcification either within or immediately adjacent to the common bile duct as compared with study from 10/8/2017, without evidence of biliary obstruction  Fatty infiltration of liver  Small amount of pericholecystic fluid and gallstones noted  No gallbladder wall thickening seen  Workstation performed: [id number]       ASSESMENT/ PLAN:     1  Anemia  Hemoglobin stable at this time  This is normocytic with iron studies suggesting anemia of chronic disease  No evidence of bleeding  No urgent endoscopic evaluation at this time  We can follow up on Tuesday and evaluate for endoscopy if needed  At this time his hyponatremia would also preclude him getting anesthesia unless the procedure is emergent  Discussed with the primary team   2   Abnormality seen adjacent to the bile duct on imaging  Outpatient endoscopic ultrasound versus MRCP

## 2018-06-01 NOTE — PROGRESS NOTES
Progress Note - Nephrology   Tej Pac 47 y o  male MRN: [id number]  Unit/Bed#: 446-11 Encounter: [id number]    A/P:  1  Hyponatremia: His FeNa calculates to 0 52%  This may be due to cirrhosis, however, he did have diarrhea and was on diuretics indicating mild intravascular volume depletion prior to admission  He is correcting appropriately with fluid restriction and ice chips  He has marked hypoalbuminemia ia indicating decreased solute intake  So maynot correct to within normal range  6/1: He says he is limiting fluids, however, sodium dropped  Will check urine lytes  May be related to profound hypomagnesemia   2  Hypomagnesemia: due to alcoholism: Will replace with magnesium sulfate  He is receiving oral magnesium as well  6/1: magnesium dropping in spite of supplementation  Await urine magnesium as he is probably wasting magnesium renally  3  Hypoalbuminemia: dietary intervention and cessation of alcohol  4  Cellulitis: on IV vancomycin and levofloxacin with improvement  5  Transaminitis: due to liver disease: will need outpatient GI followup  6: Essential hypertension: stable  7  Alcohol dependence: has been addressed  8   Anemia: normochromic, being followed      Follow up reason for today's visit: Hyponatremia    Cellulitis of right lower extremity    Patient Active Problem List   Diagnosis    [facility] dependence ([facility] [address] [facility]    Tobacco use    Essential hypertension    COPD (chronic obstructive pulmonary disease) ([facility] [address] [facility]    H/O suicide attempt    H/O cervical spine surgery    Cholelithiasis    Hyponatremia    Dietary folate deficiency anemia    Hepatomegaly    Hepatic steatosis    Hypomagnesemia    Elevated alkaline phosphatase level    Alcoholic hepatitis without ascites    Vitamin D deficiency    Iron deficiency    Generalized weakness    Body mass index (BMI) 21 0-21 9, adult    Malnutrition of moderate degree ([address] )    Seizure (HCC)    Continuous chronic alcoholism ([facility] [address] )    Incisional hernia    Hx of seizure disorder    Seizure-like activity (HCC)    Diarrhea    Cellulitis of right lower extremity         Subjective:   Denies headache, dizziness, chest pain, dyspnea, abdominal pain, dysuria  Says heis breathing well     Objective:     Vitals: Blood pressure 126/77, pulse 82, temperature 99 °F (37 2 °C), temperature source Temporal, resp  rate 18, height 5' 5" (1 651 m), weight 68 7 kg (151 lb 7 3 oz), SpO2 96 %  ,Body mass index is 25 2 kg/m²  Weight (last 2 days)     Date/Time   Weight    06/01/18 0600  68 7 (151 46)    05/31/18 0600  69 7 (153 66)    05/30/18 0600  66 (145 5)                Intake/Output Summary (Last 24 hours) at 06/01/18 0945  Last data filed at 06/01/18 0735   Gross per 24 hour   Intake              420 ml   Output             1940 ml   Net            -1520 ml     I/O last 3 completed shifts:   In: 1180 [P O :780; IV Piggyback:400]  Out: 2890 [Urine:2890]         Physical Exam: /77   Pulse 82   Temp 99 °F (37 2 °C) (Temporal)   Resp 18   Ht 5' 5" (1 651 m)   Wt 68 7 kg (151 lb 7 3 oz)   SpO2 96%   BMI 25 20 kg/m²     General Appearance:    Alert, cooperative, no distress, appears stated age   Head:    Normocephalic, without obvious abnormality, atraumatic   Eyes:    Scleral icterus   Ears:    Normal external ears   Nose:   Nares normal, septum midline, mucosa normal, no drainage    or sinus tenderness   Throat:   Lips, mucosa, and tongue normal; teeth and gums normal   Neck:   Supple, symmetrical, trachea midline, no adenopathy;        thyroid:  No enlargement/tenderness/nodules; no carotid    bruit or JVD   Back:     Symmetric, no curvature, ROM normal, no CVA tenderness   Lungs:     Decreased to auscultation bilaterally, respirations unlabored   Chest wall:    No tenderness or deformity   Heart:    Regular rate and rhythm, S1 and S2 normal, no murmur, rub   or gallop   Abdomen:     Soft, non-tender, bowel sounds active   Extremities: Extremities no edema  Has cellulitis of RLE with mild improvement  No  edema   Skin:   Skin color as above - I no  lesions   Lymph nodes:   Cervical normal   Neurologic:   CNII-XII intact            Lab, Imaging and other studies: I have personally reviewed pertinent labs  CBC:   Lab Results   Component Value Date    WBC 12 32 (H) 06/01/2018    HGB 7 5 (L) 06/01/2018    HCT 23 4 (L) 06/01/2018    MCV 94 06/01/2018     06/01/2018    MCH 30 2 06/01/2018    MCHC 32 1 06/01/2018    RDW 20 2 (H) 06/01/2018    MPV 10 2 06/01/2018     CMP:   Lab Results   Component Value Date     (L) 06/01/2018    K 4 1 06/01/2018    CL 94 (L) 06/01/2018    CO2 26 06/01/2018    ANIONGAP 5 06/01/2018    BUN 8 06/01/2018    CREATININE 0 55 (L) 06/01/2018    GLUCOSE 85 06/01/2018    CALCIUM 7 8 (L) 06/01/2018     (H) 06/01/2018    ALT 37 06/01/2018    ALKPHOS 208 (H) 06/01/2018    PROT 6 7 06/01/2018    BILITOT 2 10 (H) 06/01/2018    EGFR 121 06/01/2018             Results from last 7 days  Lab Units 06/01/18  0632 05/31/18  0431 05/30/18  0526   SODIUM mmol/L 125* 129* 127*   POTASSIUM mmol/L 4 1 4 6 4 0   CHLORIDE mmol/L 94* 95* 93*   CO2 mmol/L 26 27 28   BUN mg/dL 8 7 7   CREATININE mg/dL 0 55* 0 54* 0 51*   CALCIUM mg/dL 7 8* 7 4* 7 7*   TOTAL PROTEIN g/dL 6 7 6 7 6 3*   BILIRUBIN TOTAL mg/dL 2 10* 2 50* 2 50*   ALK PHOS U/L 208* 216* 207*   ALT U/L 37 36 35   AST U/L 103* 122* 101*   GLUCOSE RANDOM mg/dL 85 80 76         Phosphorus:   Lab Results   Component Value Date    PHOS 3 9 06/01/2018     Magnesium:   Lab Results   Component Value Date    MG 1 2 (L) 06/01/2018     Urinalysis: No results found for: COLORU, CLARITYU, SPECGRAV, PHUR, LEUKOCYTESUR, NITRITE, PROTEINUA, GLUCOSEU, KETONESU, BILIRUBINUR, BLOODU  Ionized Calcium: No results found for: CAION  Coagulation: No results found for: PT, INR, APTT  Troponin: No results found for: TROPONINI  ABG: No results found for: PHART, SYL4OFK, PO2ART, SOT0OJW, Y7OGJYJD, NORIST, SOURCE  Radiology review:     IMAGING  No results found      Current Facility-Administered Medications   Medication Dose Route Frequency    albuterol inhalation solution 2 5 mg  2 5 mg Nebulization Q6H PRN    cholecalciferol (VITAMIN D3) tablet 1,000 Units  1,000 Units Oral Daily    cyanocobalamin (VITAMIN B-12) tablet 100 mcg  100 mcg Oral Daily    diphenhydrAMINE (BENADRYL) injection 25 mg  25 mg Intravenous Q6H PRN    enoxaparin (LOVENOX) subcutaneous injection 40 mg  40 mg Subcutaneous S08A    folic acid (FOLVITE) tablet 1 mg  1 mg Oral Daily    gabapentin (NEURONTIN) capsule 300 mg  300 mg Oral TID    levETIRAcetam (KEPPRA) tablet 1,500 mg  1,500 mg Oral Q12H [address]    levofloxacin (LEVAQUIN) IVPB (premix) 750 mg  750 mg Intravenous Q24H    lisinopril (ZESTRIL) tablet 10 mg  10 mg Oral Daily    LORazepam (ATIVAN) 2 mg/mL injection 1 mg  1 mg Intravenous Q4H PRN    LORazepam (ATIVAN) tablet 0 5 mg  0 5 mg Oral TID    magnesium oxide (MAG-OX) tablet 800 mg  800 mg Oral BID    multivitamin-minerals (CENTRUM) tablet 1 tablet  1 tablet Oral Daily    nicotine (NICODERM CQ) 7 mg/24hr TD 24 hr patch 1 patch  1 patch Transdermal Daily    ondansetron (ZOFRAN) injection 4 mg  4 mg Intravenous Q6H PRN    oxyCODONE (ROXICODONE) IR tablet 5 mg  5 mg Oral BID PRN    potassium-sodium phosphateS (K-PHOS,PHOSPHA 250) -250 mg tablet 1 tablet  1 tablet Oral 4x Daily (with meals and at bedtime)    thiamine (VITAMIN B1) tablet 100 mg  100 mg Oral Daily    vancomycin (VANCOCIN) 1,250 mg in sodium chloride 0 9 % 250 mL IVPB  20 mg/kg Intravenous Q12H    vancomycin (VANCOCIN) oral solution 125 mg  125 mg Oral Q12H [address]     Medications Discontinued During This Encounter   Medication Reason    Magnesium Oxide 400 MG CAPS     thiamine 100 MG tablet     folic acid (FOLVITE) tablet 1 mg Duplicate order    multivitamin stress formula tablet 1 tablet     LORazepam (ATIVAN) 2 mg/mL injection 0 5 mg  LORazepam (ATIVAN) 2 mg/mL injection 1 mg     levETIRAcetam (KEPPRA) 1,500 mg in sodium chloride 0 9 % 100 mL IVPB     magnesium sulfate 4 g/100 mL IVPB (premix) 4 g Alternate therapy    LORazepam (ATIVAN) 2 mg/mL injection 1 mg     potassium chloride (K-DUR,KLOR-CON) CR tablet 40 mEq     HYDROmorphone (DILAUDID) injection 0 5 mg     oxyCODONE (ROXICODONE) IR tablet 5 mg     magnesium sulfate 4 g/100 mL IVPB (premix) 4 g     magnesium sulfate 2 g/50 mL IVPB (premix) 2 g     sodium chloride 0 9 % infusion     LORazepam (ATIVAN) tablet 1 mg     magnesium oxide (MAG-OX) tablet 400 mg     ceFAZolin (ANCEF) IVPB (premix) 1,000 mg        Key Gallagher MD

## 2018-06-01 NOTE — OCCUPATIONAL THERAPY NOTE
OT Note     06/01/18 1253   General   Missed Treatment Reason Patient refusal   Assessment   Assessment OT attempted see pt  this p m  Presents sidelying, appears to be asleep, responds when name is called  Declines participation secondary continued c/o diarrhea and pain  Continue as able  Recommendation   Recommendation (Continue OT services as able  )

## 2018-06-01 NOTE — PROGRESS NOTES
Progress Note - Oscar Zhang 1965, 47 y o  male MRN: [id number]    Unit/Bed#: 422-01 Encounter: [id number]    Primary Care Provider: Tiffany Soliman PA-C   Date and time admitted to hospital: 5/22/2018  3:08 PM        * Cellulitis of right lower extremity   Assessment & Plan    Appears similar to yesterday distribution, continue Vanco/Levaquin, along with PO Vanco for c diff prophylaxis  Will d/w ID due to persistence of erythema, warmth and tenderness  Wound culture growing Staph that is sensitive to Ancef - patient received 1 week of therapy - but may have been insufficient wound cultures given worsening erythema  Podiatry input appreciated  Will also check venous duplex right lower extremity to rule out DVT given persistence of pain and erythema despite multiple antibiotics        Alcoholic hepatitis without ascites   Assessment & Plan    Stable  GI evaluated, patient not candidate for steroids  Continue to monitor LFTs, check ammonia level and INR  Counseling on total alcohol cessation        Hypomagnesemia   Assessment & Plan    Secondary to alcohol abuse and degree of malnutrition  Mg again dropped to 1 2  Nephrology input appreciated  Continue with replacement  Monitor Mg daily          Hyponatremia   Assessment & Plan    Sodium again dropped to 125 today, patient remains asymptomatic  Acute on chronic, secondary to beer potomania  Nephrology input appreciated  Continue fluid restriction of 1500 cc per 24 hours  Monitor Na level daily        Alcohol dependence (HCC)   Assessment & Plan    Continue vitamin supplementation  Continue to replete electrolytes  Continue P  O  Ativan 2 5 mg p o  t i d   IV Ativan p r n    Psychiatry input appreciated, noted recommendations to start naltrexone 25 mg daily to help prevent return to heavy drinking after discharge  Plan to discharge to SNF, patient undergoing optioning process due to prior history of inpatient psychiatric hospitalizations  Patient does not meet criteria for involuntary commitment and is deemed capacitated to make healthcare decisions by Psychiatry         Seizure-like activity [facility])   Assessment & Plan    No recurrence since admission   CT head reviewed  Prolactin is top-normal  Transitioned to p o  Keppra 1500 mg p o  b i d  Possibly related to alcohol withdrawal  Outpatient Neurology follow-up        Malnutrition of moderate degree ([facility] [address] )   Assessment & Plan    Malnutrition Findings:         Secondary to alcohol abuse    BMI Findings: Body mass index is 25 2 kg/m²  Consult nutrition        Dietary folate deficiency anemia   Assessment & Plan    GI input appreciated he will require outpatient follow-up for EGD and colonoscopy  Hemoglobin dropped to 7 5, and stable, had 1 u PRBC transfusion at time of admission  Continue folate replacement  Monitor  Hem occult stool  Discussed with GI, as H&H has been stable and due to marked hyponatremia no urgent procedures are recommended        Tobacco use   Assessment & Plan    -nicotine patch  -smoking cessation counseling          VTE Pharmacologic Prophylaxis:   Pharmacologic: None due to anemia  Mechanical VTE Prophylaxis in Place: No    Patient Centered Rounds: I have performed bedside rounds with nursing staff today  Discussions with Specialists or Other Care Team Provider: GI    Education and Discussions with Family / Patient: Patient declined    Time Spent for Care: 45 minutes  More than 50% of total time spent on counseling and coordination of care as described above  Current Length of Stay: 10 day(s)    Current Patient Status: Inpatient   Certification Statement: The patient will continue to require additional inpatient hospital stay due to need for close monitroing, antibiotic adjustment, discharge planning    Discharge Plan: TBD    Code Status: Level 1 - Full Code      Subjective:   Patient seen and examined  No o/n events   He is c/o persistent RLE erythema, tenderness and warmth, describes pain as 5/10  Objective:     Vitals:   Temp (24hrs), Av 8 °F (37 1 °C), Min:98 °F (36 7 °C), Max:99 1 °F (37 3 °C)    HR:  [82-90] 82  Resp:  [16-18] 18  BP: (106-130)/(61-83) 126/77  SpO2:  [95 %-98 %] 96 %  Body mass index is 25 2 kg/m²  Input and Output Summary (last 24 hours): Intake/Output Summary (Last 24 hours) at 18 1230  Last data filed at 18 1200   Gross per 24 hour   Intake              660 ml   Output             2500 ml   Net            -1840 ml       Physical Exam:     Physical Exam   Constitutional: He is oriented to person, place, and time  No distress  HENT:   Head: Normocephalic and atraumatic  Eyes: Conjunctivae are normal    Neck: No JVD present  Cardiovascular: Normal rate and regular rhythm  No murmur heard  Pulmonary/Chest: Effort normal  No respiratory distress  He has no wheezes  He has no rales  Abdominal: Soft  He exhibits distension  There is no tenderness  There is no guarding  Musculoskeletal: He exhibits tenderness (RLE)  He exhibits no edema  Neurological: He is alert and oriented to person, place, and time  Skin: There is erythema (RLE shin)  Psychiatric: He has a normal mood and affect  Additional Data:     Labs:      Results from last 7 days  Lab Units 18  0632   WBC Thousand/uL 12 32*   HEMOGLOBIN g/dL 7 5*   HEMATOCRIT % 23 4*   PLATELETS Thousands/uL 265   NEUTROS PCT % 70   LYMPHS PCT % 16   MONOS PCT % 12   EOS PCT % 1       Results from last 7 days  Lab Units 18  0632   SODIUM mmol/L 125*   POTASSIUM mmol/L 4 1   CHLORIDE mmol/L 94*   CO2 mmol/L 26   BUN mg/dL 8   CREATININE mg/dL 0 55*   CALCIUM mg/dL 7 8*   TOTAL PROTEIN g/dL 6 7   BILIRUBIN TOTAL mg/dL 2 10*   ALK PHOS U/L 208*   ALT U/L 37   AST U/L 103*   GLUCOSE RANDOM mg/dL 85       Results from last 7 days  Lab Units 18  0431   INR  1 30*                 * I Have Reviewed All Lab Data Listed Above  * Additional Pertinent Lab Tests Reviewed:  All [facility] Admission Reviewed    Imaging:    Imaging Reports Reviewed Today Include: None today    Recent Cultures (last 7 days):           Last 24 Hours Medication List:     Current Facility-Administered Medications:  albuterol 2 5 mg Nebulization Q6H PRN Federica Wynne, DO    cholecalciferol 1,000 Units Oral Daily Federica Wynne, DO    cyanocobalamin 100 mcg Oral Daily Federica Wynne, DO    diphenhydrAMINE 25 mg Intravenous Q6H PRN Maggie Contreras MD    enoxaparin 40 mg Subcutaneous Q24H Federica Wynne, DO    folic acid 1 mg Oral Daily Federica Wynne, DO    gabapentin 300 mg Oral TID Federica Wynne, DO    levETIRAcetam 1,500 mg Oral Q12H [facility] Yahir Corrigan MD    levofloxacin 750 mg Intravenous Q24H Maggie Contreras MD Last Rate: 750 mg (05/31/18 2124)   lisinopril 10 mg Oral Daily Federica Wynne, DO    LORazepam 1 mg Intravenous Q4H PRN Federica Wynne, DO    LORazepam 0 5 mg Oral TID Yahir Corrigan MD    magnesium oxide 800 mg Oral BID Zoey Mejía MD    magnesium sulfate 2 g Intravenous Q2H Diane Soto MD    multivitamin-minerals 1 tablet Oral Daily Federica Wynne, DO    nicotine 1 patch Transdermal Daily Federica Wynne, DO    ondansetron 4 mg Intravenous Q6H PRN Federica Wynne, DO    oxyCODONE 5 mg Oral BID PRN Yahir Corrigan MD    potassium-sodium phosphateS 1 tablet Oral 4x Daily (with meals and at bedtime) Yahir Corrigan MD    thiamine 100 mg Oral Daily Federica Wynne, DO    vancomycin 1,500 mg Intravenous Q12H Maggie Contreras MD    vancomycin 125 mg Oral Q12H Roopa Hoff MD         Today, Patient Was Seen By: Hallie Blake MD    ** Please Note: Dictation voice to text software may have been used in the creation of this document   **

## 2018-06-01 NOTE — ASSESSMENT & PLAN NOTE
Secondary to alcohol abuse and degree of malnutrition  Mg again dropped to 1 2  Nephrology input appreciated  Continue with replacement  Monitor Mg daily

## 2018-06-01 NOTE — ASSESSMENT & PLAN NOTE
Malnutrition Findings:         Secondary to alcohol abuse    BMI Findings: Body mass index is 25 2 kg/m²     Consult nutrition

## 2018-06-01 NOTE — ASSESSMENT & PLAN NOTE
Appears similar to yesterday distribution, continue Vanco/Levaquin, along with PO Vanco for c diff prophylaxis  Will d/w ID due to persistence of erythema, warmth and tenderness  Wound culture growing Staph that is sensitive to Ancef - patient received 1 week of therapy - but may have been insufficient wound cultures given worsening erythema  Podiatry input appreciated  Will also check venous duplex right lower extremity to rule out DVT given persistence of pain and erythema despite multiple antibiotics

## 2018-06-01 NOTE — ASSESSMENT & PLAN NOTE
GI input appreciated he will require outpatient follow-up for EGD and colonoscopy  Hemoglobin dropped to 7 5, and stable, had 1 u PRBC transfusion at time of admission  Continue folate replacement  Monitor  Hem occult stool  Discussed with GI, as H&H has been stable and due to marked hyponatremia no urgent procedures are recommended

## 2018-06-02 PROBLEM — L02.415 ABSCESS OF RIGHT LEG: Status: ACTIVE | Noted: 2018-06-02

## 2018-06-02 LAB
ALBUMIN SERPL BCP-MCNC: 2 G/DL (ref 3.5–5)
ALP SERPL-CCNC: 217 U/L (ref 46–116)
ALT SERPL W P-5'-P-CCNC: 41 U/L (ref 12–78)
ANION GAP SERPL CALCULATED.3IONS-SCNC: 8 MMOL/L (ref 4–13)
AST SERPL W P-5'-P-CCNC: 105 U/L (ref 5–45)
BASOPHILS # BLD AUTO: 0.13 THOUSANDS/ΜL (ref 0–0.1)
BASOPHILS NFR BLD AUTO: 1 % (ref 0–1)
BILIRUB SERPL-MCNC: 1.8 MG/DL (ref 0.2–1)
BUN SERPL-MCNC: 7 MG/DL (ref 5–25)
CALCIUM SERPL-MCNC: 8 MG/DL (ref 8.3–10.1)
CHLORIDE SERPL-SCNC: 93 MMOL/L (ref 100–108)
CO2 SERPL-SCNC: 26 MMOL/L (ref 21–32)
CREAT SERPL-MCNC: 0.54 MG/DL (ref 0.6–1.3)
EOSINOPHIL # BLD AUTO: 0.23 THOUSAND/ΜL (ref 0–0.61)
EOSINOPHIL NFR BLD AUTO: 2 % (ref 0–6)
ERYTHROCYTE [DISTWIDTH] IN BLOOD BY AUTOMATED COUNT: 20.3 % (ref 11.6–15.1)
GFR SERPL CREATININE-BSD FRML MDRD: 122 ML/MIN/1.73SQ M
GLUCOSE SERPL-MCNC: 80 MG/DL (ref 65–140)
HCT VFR BLD AUTO: 25.8 % (ref 36.5–49.3)
HGB BLD-MCNC: 8.4 G/DL (ref 12–17)
LYMPHOCYTES # BLD AUTO: 1.9 THOUSANDS/ΜL (ref 0.6–4.47)
LYMPHOCYTES NFR BLD AUTO: 18 % (ref 14–44)
MAGNESIUM SERPL-MCNC: 1.3 MG/DL (ref 1.6–2.6)
MCH RBC QN AUTO: 30.9 PG (ref 26.8–34.3)
MCHC RBC AUTO-ENTMCNC: 32.6 G/DL (ref 31.4–37.4)
MCV RBC AUTO: 95 FL (ref 82–98)
MONOCYTES # BLD AUTO: 1.21 THOUSAND/ΜL (ref 0.17–1.22)
MONOCYTES NFR BLD AUTO: 12 % (ref 4–12)
NEUTROPHILS # BLD AUTO: 7.04 THOUSANDS/ΜL (ref 1.85–7.62)
NEUTS SEG NFR BLD AUTO: 67 % (ref 43–75)
PLATELET # BLD AUTO: 295 THOUSANDS/UL (ref 149–390)
PMV BLD AUTO: 10.2 FL (ref 8.9–12.7)
POTASSIUM SERPL-SCNC: 4.2 MMOL/L (ref 3.5–5.3)
PROT SERPL-MCNC: 7.1 G/DL (ref 6.4–8.2)
RBC # BLD AUTO: 2.72 MILLION/UL (ref 3.88–5.62)
SODIUM SERPL-SCNC: 127 MMOL/L (ref 136–145)
WBC # BLD AUTO: 10.51 THOUSAND/UL (ref 4.31–10.16)

## 2018-06-02 PROCEDURE — 87070 CULTURE OTHR SPECIMN AEROBIC: CPT | Performed by: SURGERY

## 2018-06-02 PROCEDURE — 99233 SBSQ HOSP IP/OBS HIGH 50: CPT | Performed by: FAMILY MEDICINE

## 2018-06-02 PROCEDURE — 87205 SMEAR GRAM STAIN: CPT | Performed by: SURGERY

## 2018-06-02 PROCEDURE — 80053 COMPREHEN METABOLIC PANEL: CPT | Performed by: FAMILY MEDICINE

## 2018-06-02 PROCEDURE — 83735 ASSAY OF MAGNESIUM: CPT | Performed by: INTERNAL MEDICINE

## 2018-06-02 PROCEDURE — 85025 COMPLETE CBC W/AUTO DIFF WBC: CPT | Performed by: FAMILY MEDICINE

## 2018-06-02 PROCEDURE — 99254 IP/OBS CNSLTJ NEW/EST MOD 60: CPT | Performed by: SURGERY

## 2018-06-02 PROCEDURE — 93971 EXTREMITY STUDY: CPT | Performed by: SURGERY

## 2018-06-02 PROCEDURE — 0J9N0ZZ DRAINAGE OF RIGHT LOWER LEG SUBCUTANEOUS TISSUE AND FASCIA, OPEN APPROACH: ICD-10-PCS | Performed by: FAMILY MEDICINE

## 2018-06-02 RX ORDER — MAGNESIUM SULFATE HEPTAHYDRATE 40 MG/ML
2 INJECTION, SOLUTION INTRAVENOUS ONCE
Status: COMPLETED | OUTPATIENT
Start: 2018-06-02 | End: 2018-06-02

## 2018-06-02 RX ADMIN — LORAZEPAM 0.5 MG: 0.5 TABLET ORAL at 14:58

## 2018-06-02 RX ADMIN — VITAMIN D, TAB 1000IU (100/BT) 1000 UNITS: 25 TAB at 10:13

## 2018-06-02 RX ADMIN — Medication 800 MG: at 17:35

## 2018-06-02 RX ADMIN — NICOTINE 1 PATCH: 7 PATCH, EXTENDED RELEASE TRANSDERMAL at 10:14

## 2018-06-02 RX ADMIN — DIBASIC SODIUM PHOSPHATE, MONOBASIC POTASSIUM PHOSPHATE AND MONOBASIC SODIUM PHOSPHATE 1 TABLET: 852; 155; 130 TABLET ORAL at 14:58

## 2018-06-02 RX ADMIN — VANCOMYCIN HYDROCHLORIDE 1500 MG: 1 INJECTION, POWDER, LYOPHILIZED, FOR SOLUTION INTRAVENOUS at 06:09

## 2018-06-02 RX ADMIN — MAGNESIUM SULFATE HEPTAHYDRATE 2 G: 40 INJECTION, SOLUTION INTRAVENOUS at 21:50

## 2018-06-02 RX ADMIN — DIBASIC SODIUM PHOSPHATE, MONOBASIC POTASSIUM PHOSPHATE AND MONOBASIC SODIUM PHOSPHATE 1 TABLET: 852; 155; 130 TABLET ORAL at 21:50

## 2018-06-02 RX ADMIN — LEVETIRACETAM 1500 MG: 500 TABLET ORAL at 10:13

## 2018-06-02 RX ADMIN — DIBASIC SODIUM PHOSPHATE, MONOBASIC POTASSIUM PHOSPHATE AND MONOBASIC SODIUM PHOSPHATE 1 TABLET: 852; 155; 130 TABLET ORAL at 10:14

## 2018-06-02 RX ADMIN — LORAZEPAM 0.5 MG: 0.5 TABLET ORAL at 19:58

## 2018-06-02 RX ADMIN — VITAM B12 100 MCG: 100 TAB at 10:13

## 2018-06-02 RX ADMIN — MULTIPLE VITAMINS W/ MINERALS TAB 1 TABLET: TAB at 10:13

## 2018-06-02 RX ADMIN — GABAPENTIN 300 MG: 300 CAPSULE ORAL at 10:14

## 2018-06-02 RX ADMIN — DIBASIC SODIUM PHOSPHATE, MONOBASIC POTASSIUM PHOSPHATE AND MONOBASIC SODIUM PHOSPHATE 1 TABLET: 852; 155; 130 TABLET ORAL at 17:35

## 2018-06-02 RX ADMIN — LEVETIRACETAM 1500 MG: 500 TABLET ORAL at 21:50

## 2018-06-02 RX ADMIN — THIAMINE HCL TAB 100 MG 100 MG: 100 TAB at 10:14

## 2018-06-02 RX ADMIN — Medication 800 MG: at 10:14

## 2018-06-02 RX ADMIN — VANCOMYCIN 125 MG: KIT at 21:50

## 2018-06-02 RX ADMIN — LORAZEPAM 0.5 MG: 0.5 TABLET ORAL at 10:13

## 2018-06-02 RX ADMIN — GABAPENTIN 300 MG: 300 CAPSULE ORAL at 17:35

## 2018-06-02 RX ADMIN — VANCOMYCIN 125 MG: KIT at 10:18

## 2018-06-02 RX ADMIN — GABAPENTIN 300 MG: 300 CAPSULE ORAL at 21:50

## 2018-06-02 RX ADMIN — VANCOMYCIN HYDROCHLORIDE 1500 MG: 1 INJECTION, POWDER, LYOPHILIZED, FOR SOLUTION INTRAVENOUS at 18:37

## 2018-06-02 RX ADMIN — LISINOPRIL 10 MG: 10 TABLET ORAL at 10:14

## 2018-06-02 RX ADMIN — ENOXAPARIN SODIUM 40 MG: 40 INJECTION, SOLUTION INTRAVENOUS; SUBCUTANEOUS at 21:50

## 2018-06-02 RX ADMIN — FOLIC ACID 1 MG: 1 TABLET ORAL at 10:13

## 2018-06-02 NOTE — PROGRESS NOTES
Progress Note - Papa Cade 1965, 47 y o  male MRN: [id number]    Unit/Bed#: 047-92 Encounter: [id number]    Primary Care Provider: Chelo Duncan PA-C   Date and time admitted to hospital: 5/22/2018  3:08 PM        * Cellulitis of right lower extremity   Assessment & Plan    Has been resistant to antibiotic management with development of for superficial abscesses   Will continue Vanco/Levaquin, along with PO Vanco for c diff prophylaxis  Wound culture growing Staph that is sensitive to Ancef - patient received 1 week of therapy - but may have been insufficient wound cultures given worsening erythema  Discussed with Infectious Disease and surgery, will need ID either today or tomorrow  Podiatry input appreciated  Venous Dopplers of right lower extremity negative for DVT        Alcoholic hepatitis without ascites   Assessment & Plan    Stable  GI evaluated, patient not candidate for steroids  Continue to monitor LFTs and INR, ammonia level within normal limits  Counseling on total alcohol cessation        Hypomagnesemia   Assessment & Plan    Secondary to alcohol abuse and degree of malnutrition  Mg 1 3 today  Nephrology input appreciated  Continue with replacement  Monitor Mg daily          Hyponatremia   Assessment & Plan    Sodium 127 today, patient remains asymptomatic  Acute on chronic, secondary to beer potomania  Nephrology input appreciated  Continue fluid restriction of 1500 cc per 24 hours  Monitor Na level daily        Alcohol dependence ([facility] [address] )   Assessment & Plan    Continue vitamin supplementation  Continue to replete electrolytes  Continue P  O  Ativan 2 5 mg p o  t i d   IV Ativan p r n    Psychiatry input appreciated, noted recommendations to start naltrexone 25 mg daily to help prevent return to heavy drinking after discharge  Plan to discharge to SNF, patient undergoing optioning process due to prior history of inpatient psychiatric hospitalizations  Patient does not meet criteria for involuntary commitment and is deemed capacitated to make healthcare decisions by Psychiatry         Abscess of right leg   Assessment & Plan    4 superficial abscesses RLE  Has been         Seizure-like activity [facility])   Assessment & Plan    No recurrence since admission   CT head reviewed  Prolactin is top-normal  Transitioned to p o  Keppra 1500 mg p o  b i d  Possibly related to alcohol withdrawal  Outpatient Neurology follow-up        Malnutrition of moderate degree ([address] )   Assessment & Plan    Malnutrition Findings:         Secondary to alcohol abuse    BMI Findings: Body mass index is 24 84 kg/m²  Consult nutrition        Dietary folate deficiency anemia   Assessment & Plan    GI input appreciated he will require outpatient follow-up for EGD and colonoscopy  Hemoglobin dropped to 7 5 now improved to 8 4 today, had 1 u PRBC transfusion at time of admission  Continue folate replacement  Monitor CBC daily  Discussed with GI, as H&H has been stable and due to marked hyponatremia no urgent procedures are recommended        Tobacco use   Assessment & Plan    -nicotine patch  -smoking cessation counseling          VTE Pharmacologic Prophylaxis:   Pharmacologic: Enoxaparin (Lovenox)  Mechanical VTE Prophylaxis in Place: No    Patient Centered Rounds: I have performed bedside rounds with nursing staff today  Discussions with Specialists or Other Care Team Provider:  Infectious Disease, surgery    Education and Discussions with Family / Patient:  Sister Glynn Mejia    Time Spent for Care: 45 minutes  More than 50% of total time spent on counseling and coordination of care as described above      Current Length of Stay: 11 day(s)    Current Patient Status: Inpatient   Certification Statement: The patient will continue to require additional inpatient hospital stay due to need for IV antibiotics, close monitoring and need for I&D    Discharge Plan: TBD    Code Status: Level 1 - Full Code      Subjective:   Patient seen and examined  He is sleeping but easily aroused  He is complaining of right lower extremity pain, no other complaints  He denies chest pain, shortness of breath or palpitations  He denies abdominal pain, nausea, vomiting, diarrhea constipation  He has been tolerating his diet  Objective:     Vitals:   Temp (24hrs), Av 7 °F (37 1 °C), Min:98 3 °F (36 8 °C), Max:98 8 °F (37 1 °C)    HR:  [80-94] 80  Resp:  [18] 18  BP: (112-130)/(73-80) 128/78  SpO2:  [95 %-98 %] 98 %  Body mass index is 24 84 kg/m²  Input and Output Summary (last 24 hours): Intake/Output Summary (Last 24 hours) at 18 1253  Last data filed at 18 0931   Gross per 24 hour   Intake              520 ml   Output             4325 ml   Net            -3805 ml       Physical Exam:     Physical Exam   Constitutional: He is oriented to person, place, and time  No distress  HENT:   Head: Normocephalic and atraumatic  Eyes: Conjunctivae are normal    Neck: No JVD present  Cardiovascular: Normal rate and regular rhythm  No murmur heard  Pulmonary/Chest: Effort normal  No respiratory distress  He has no wheezes  He has no rales  Abdominal: Soft  He exhibits distension  There is no tenderness  There is no guarding  Musculoskeletal: He exhibits no edema  Neurological: He is alert and oriented to person, place, and time  Skin: There is erythema (RLE with multiple fluctuating collections along shin)  Psychiatric: He has a normal mood and affect         Additional Data:     Labs:      Results from last 7 days  Lab Units 18  0603   WBC Thousand/uL 10 51*   HEMOGLOBIN g/dL 8 4*   HEMATOCRIT % 25 8*   PLATELETS Thousands/uL 295   NEUTROS PCT % 67   LYMPHS PCT % 18   MONOS PCT % 12   EOS PCT % 2       Results from last 7 days  Lab Units 18  0603   SODIUM mmol/L 127*   POTASSIUM mmol/L 4 2   CHLORIDE mmol/L 93*   CO2 mmol/L 26   BUN mg/dL 7   CREATININE mg/dL 0 54*   CALCIUM mg/dL 8 0*   TOTAL PROTEIN g/dL 7  1   BILIRUBIN TOTAL mg/dL 1 80*   ALK PHOS U/L 217*   ALT U/L 41   AST U/L 105*   GLUCOSE RANDOM mg/dL 80       Results from last 7 days  Lab Units 05/31/18  0431   INR  1 30*                 * I Have Reviewed All Lab Data Listed Above  * Additional Pertinent Lab Tests Reviewed:  [address] Admission Reviewed    Imaging:    Imaging Reports Reviewed Today Include: CT RLE with contrast    Recent Cultures (last 7 days):           Last 24 Hours Medication List:     Current Facility-Administered Medications:  albuterol 2 5 mg Nebulization Q6H PRN Shashi Risser, DO    cholecalciferol 1,000 Units Oral Daily Shashi Risser, DO    cyanocobalamin 100 mcg Oral Daily Shashi Risser, DO    diphenhydrAMINE 25 mg Intravenous Q6H PRN Salas García MD    enoxaparin 40 mg Subcutaneous Q24H Shashi Risser, DO    folic acid 1 mg Oral Daily Shashi Risser, DO    gabapentin 300 mg Oral TID Shashi Risser, DO    levETIRAcetam 1,500 mg Oral Q12H [address] Grace Dailey MD    levofloxacin 750 mg Intravenous Q24H Salas García MD Last Rate: 750 mg (06/01/18 2208)   lisinopril 10 mg Oral Daily Shashi Risser, DO    LORazepam 1 mg Intravenous Q4H PRN Shashi Toussaint, DO    LORazepam 0 5 mg Oral TID Grace Dailey MD    magnesium oxide 800 mg Oral BID Grace Dailey MD    multivitamin-minerals 1 tablet Oral Daily Shashi Risser, DO    nicotine 1 patch Transdermal Daily Shashi Risser, DO    ondansetron 4 mg Intravenous Q6H PRN Shashi Toussaint, DO    oxyCODONE 5 mg Oral BID PRN Grace Dailey MD    potassium-sodium phosphateS 1 tablet Oral 4x Daily (with meals and at bedtime) Grace Dailey MD    thiamine 100 mg Oral Daily Shashi Risser, DO    vancomycin 1,500 mg Intravenous Q12H Salas García MD Last Rate: 1,500 mg (06/02/18 0609)   vancomycin 125 mg Oral Q12H Tello Edward MD         Today, Patient Was Seen By: Jesus Kaufman MD    ** Please Note: Dictation voice to text software may have been used in the creation of this document   **

## 2018-06-02 NOTE — PLAN OF CARE
Problem: Potential for Falls  Goal: Patient will remain free of falls  INTERVENTIONS:  - Assess patient frequently for physical needs  -  Identify cognitive and physical deficits and behaviors that affect risk of falls    -  [place] fall precautions as indicated by assessment   - Educate patient/family on patient safety including physical limitations  - Instruct patient to call for assistance with activity based on assessment  - Modify environment to reduce risk of injury  - Consider OT/PT consult to assist with strengthening/mobility   Outcome: Progressing      Problem: PAIN - ADULT  Goal: Verbalizes/displays adequate comfort level or baseline comfort level  Interventions:  - Encourage patient to monitor pain and request assistance  - Assess pain using appropriate pain scale  - Administer analgesics based on type and severity of pain and evaluate response  - Implement non-pharmacological measures as appropriate and evaluate response  - Consider cultural and social influences on pain and pain management  - Notify physician/advanced practitioner if interventions unsuccessful or patient reports new pain   Outcome: Progressing      Problem: INFECTION - ADULT  Goal: Absence or prevention of progression during hospitalization  INTERVENTIONS:  - Assess and monitor for signs and symptoms of infection  - Monitor lab/diagnostic results    - [place] appropriate cooling/warming therapies per order  - Administer medications as ordered  - Instruct and encourage patient and family to use good hand hygiene technique  - Identify and instruct in appropriate isolation precautions for identified infection/condition   Outcome: Progressing      Problem: SAFETY ADULT  Goal: Maintain or return to baseline ADL function  INTERVENTIONS:  -  Assess patient's ability to carry out ADLs; assess patient's baseline for ADL function and identify physical deficits which impact ability to perform ADLs (bathing, care of mouth/teeth, toileting, grooming, dressing, etc )  - Assess/evaluate cause of self-care deficits   - Assess range of motion  - Assess patient's mobility; develop plan if impaired  - Assess patient's need for assistive devices and provide as appropriate  - Encourage maximum independence but intervene and supervise when necessary  ¯ Involve family in performance of ADLs  ¯ Assess for home care needs following discharge   ¯ Request OT consult to assist with ADL evaluation and planning for discharge  ¯ Provide patient education as appropriate   Outcome: Progressing    Goal: Maintain or return mobility status to optimal level  INTERVENTIONS:  - Assess patient's baseline mobility status (ambulation, transfers, stairs, etc )    - Identify cognitive and physical deficits and behaviors that affect mobility  - Identify mobility aids required to assist with transfers and/or ambulation (gait belt, sit-to-stand, lift, walker, cane, etc )  - [place] fall precautions as indicated by assessment  - Record patient progress and toleration of activity level on Mobility SBAR; progress patient to next Phase/Stage  - Instruct patient to call for assistance with activity based on assessment  - Request Rehabilitation consult to assist with strengthening/weightbearing, etc    Outcome: Progressing      Problem: DISCHARGE PLANNING  Goal: Discharge to home or other facility with appropriate resources  INTERVENTIONS:  - Identify barriers to discharge w/patient and caregiver  - Arrange for needed discharge resources and transportation as appropriate  - Identify discharge learning needs (meds, wound care, etc )  - Arrange for interpretive services to assist at discharge as needed  - Refer to Case Management Department for coordinating discharge planning if the patient needs post-hospital services based on physician/advanced practitioner order or complex needs related to functional status, cognitive ability, or social support system   Outcome: Progressing      Problem: Knowledge Deficit  Goal: Patient/family/caregiver demonstrates understanding of disease process, treatment plan, medications, and discharge instructions  Complete learning assessment and assess knowledge base    Interventions:  - Provide teaching at level of understanding  - Provide teaching via preferred learning methods   Outcome: Progressing      Problem: Prexisting or High Potential for Compromised Skin Integrity  Goal: Skin integrity is maintained or improved  INTERVENTIONS:  - Identify patients at risk for skin breakdown  - Assess and monitor skin integrity  - Assess and monitor nutrition and hydration status  - Monitor labs (i e  albumin)  - Assess for incontinence   - Turn and reposition patient  - Assist with mobility/ambulation  - Relieve pressure over bony prominences  - Avoid friction and shearing  - Provide appropriate hygiene as needed including keeping skin clean and dry  - Evaluate need for skin moisturizer/barrier cream  - Collaborate with interdisciplinary team (i e  Nutrition, Rehabilitation, etc )   - Patient/family teaching   Outcome: Progressing      Problem: DISCHARGE PLANNING - CARE MANAGEMENT  Goal: Discharge to post-acute care or home with appropriate resources  INTERVENTIONS:  - Conduct assessment to determine patient/family and health care team treatment goals, and need for post-acute services based on payer coverage, community resources, and patient preferences, and barriers to discharge  - Address psychosocial, clinical, and financial barriers to discharge as identified in assessment in conjunction with the patient/family and health care team  - Arrange appropriate level of post-acute services according to patient's   needs and preference and payer coverage in collaboration with the physician and health care team  - Communicate with and update the patient/family, physician, and health care team regarding progress on the discharge plan  - Arrange appropriate transportation to post-acute venues   Outcome: Progressing      Problem: Nutrition/Hydration-ADULT  Goal: Nutrient/Hydration intake appropriate for improving, restoring or maintaining nutritional needs  Monitor and assess patient's nutrition/hydration status for malnutrition (ex- brittle hair, bruises, dry skin, pale skin and conjunctiva, muscle wasting, smooth red tongue, and disorientation)  Collaborate with interdisciplinary team and initiate plan and interventions as ordered  Monitor patient's weight and dietary intake as ordered or per policy  Utilize nutrition screening tool and intervene per policy  Determine patient's food preferences and provide high-protein, high-caloric foods as appropriate       INTERVENTIONS:  - Monitor oral intake, urinary output, labs, and treatment plans  - Assess nutrition and hydration status and recommend course of action  - Evaluate amount of meals eaten  - Assist patient with eating if necessary   - Allow adequate time for meals  - Recommend/ encourage appropriate diets, oral nutritional supplements, and vitamin/mineral supplements  - Order, calculate, and assess calorie counts as needed  - Recommend, monitor, and adjust tube feedings and TPN/PPN based on assessed needs  - Assess need for intravenous fluids  - Provide specific nutrition/hydration education as appropriate  - Include patient/family/caregiver in decisions related to nutrition   Outcome: Progressing

## 2018-06-02 NOTE — ASSESSMENT & PLAN NOTE
Sodium 127 today, patient remains asymptomatic  Acute on chronic, secondary to [place]bi [place]  Nephrology input appreciated  Continue fluid restriction of 1500 cc per 24 hours  Monitor Na level daily

## 2018-06-02 NOTE — ASSESSMENT & PLAN NOTE
Secondary to alcohol abuse and degree of malnutrition  Mg 1 3 today  Nephrology input appreciated  Continue with replacement  Monitor Mg daily

## 2018-06-02 NOTE — ASSESSMENT & PLAN NOTE
Malnutrition Findings:         Secondary to alcohol abuse    BMI Findings: Body mass index is 24 84 kg/m²     Consult nutrition

## 2018-06-02 NOTE — ASSESSMENT & PLAN NOTE
GI input appreciated he will require outpatient follow-up for EGD and colonoscopy  Hemoglobin dropped to 7 5 now improved to 8 4 today, had 1 u PRBC transfusion at time of admission  Continue folate replacement  Monitor CBC daily  Discussed with GI, as H&H has been stable and due to marked hyponatremia no urgent procedures are recommended

## 2018-06-02 NOTE — PROGRESS NOTES
Progress Note - Nephrology   Mayur Jeffery 47 y o  male MRN: [id number]  Unit/Bed#: 211-45 Encounter: [id number]    A/P:  1  Hyponatremia: remains asymptomatic, will continue to monitor and keep on fluid restriction  2  Hypomagnesemia: will order a 1 time dose of 2gm Magnesium iv and recheck levels in the am  Continue the po dose of magnesium  3  Hypoalbuminemia: will encourage po intake   4  Cellulitis: continue iv abx  5  Transaminitis and alcohol dependence: continue to follow      Follow up reason for today's visit: Hyponatremia    Cellulitis of right lower extremity    Patient Active Problem List   Diagnosis    Alcohol dependence ([facility] )    Tobacco use    Essential hypertension    COPD (chronic obstructive pulmonary disease) ([place] [address] )    H/O suicide attempt    H/O cervical spine surgery    Cholelithiasis    Hyponatremia    Dietary folate deficiency anemia    Hepatomegaly    Hepatic steatosis    Hypomagnesemia    Elevated alkaline phosphatase level    Alcoholic hepatitis without ascites    Vitamin D deficiency    Iron deficiency    Generalized weakness    Body mass index (BMI) 21 0-21 9, adult    Malnutrition of moderate degree (HCC)    Seizure (HCC)    Continuous chronic alcoholism ([place] [address] )    Incisional hernia    Hx of seizure disorder    Seizure-like activity ([place] [address] )    Diarrhea    Cellulitis of right lower extremity    Abscess of right leg         Subjective:   Patient is awake and alert, cooperative and pleasant  In no acute distress  Denies fever,chills, chest pain, sob, palpitations, abdominal pain or n/v/d  Positive right lower ext pain  Objective:     Vitals: Blood pressure 119/72, pulse 87, temperature 99 1 °F (37 3 °C), temperature source Temporal, resp  rate 18, height 5' 5" (1 651 m), weight 67 7 kg (149 lb 4 oz), SpO2 95 %  ,Body mass index is 24 84 kg/m²      Weight (last 2 days)     Date/Time   Weight    06/02/18 0600  67 7 (149 25)    06/01/18 0600  68 7 (151 46)    05/31/18 0600  69 7 (153 66)                Intake/Output Summary (Last 24 hours) at 06/02/18 1940  Last data filed at 06/02/18 1829   Gross per 24 hour   Intake              880 ml   Output             3925 ml   Net            -3045 ml     I/O last 3 completed shifts: In: 1 [P O :1540]  Out: 6825 [Urine:6825]         Physical Exam: /72 (BP Location: Right arm)   Pulse 87   Temp 99 1 °F (37 3 °C) (Temporal)   Resp 18   Ht 5' 5" (1 651 m)   Wt 67 7 kg (149 lb 4 oz)   SpO2 95%   BMI 24 84 kg/m²     General Appearance:    Alert, cooperative, no distress, appears stated age   Head:    Normocephalic, without obvious abnormality, atraumatic   Eyes:    Conjunctiva/corneas clear   Ears:    Normal external ears   Nose:   Nares normal, septum midline, mucosa normal, no drainage    or sinus tenderness   Throat:   Lips, mucosa, and tongue normal; teeth and gums normal   Neck:   Supple, symmetrical, trachea midline, no adenopathy;        thyroid:  No enlargement/tenderness/nodules; no carotid    bruit or JVD   Back:     Symmetric, no curvature, ROM normal, no CVA tenderness   Lungs:     Clear to auscultation bilaterally, respirations unlabored   Chest wall:    No tenderness or deformity   Heart:    Regular rate and rhythm, S1 and S2 normal, no murmur, rub   or gallop   Abdomen:     Soft, non-tender, bowel sounds active   Extremities:   Extremities normal, atraumatic, no cyanosis or edema   Skin:   Positive right lower ext with dsd intact without any drainage noted  Lymph nodes:   Cervical normal   Neurologic:   CNII-XII intact            Lab, Imaging and other studies: I have personally reviewed pertinent labs    CBC: Lab Results   Component Value Date    WBC 10 51 (H) 06/02/2018    HGB 8 4 (L) 06/02/2018    HCT 25 8 (L) 06/02/2018    MCV 95 06/02/2018     06/02/2018    MCH 30 9 06/02/2018    MCHC 32 6 06/02/2018    RDW 20 3 (H) 06/02/2018    MPV 10 2 06/02/2018     CMP: Lab Results   Component Value Date    NA 127 (L) 06/02/2018    K 4 2 06/02/2018    CL 93 (L) 06/02/2018    CO2 26 06/02/2018    ANIONGAP 8 06/02/2018    BUN 7 06/02/2018    CREATININE 0 54 (L) 06/02/2018    GLUCOSE 80 06/02/2018    CALCIUM 8 0 (L) 06/02/2018     (H) 06/02/2018    ALT 41 06/02/2018    ALKPHOS 217 (H) 06/02/2018    PROT 7 1 06/02/2018    BILITOT 1 80 (H) 06/02/2018    EGFR 122 06/02/2018         Results from last 7 days  Lab Units 06/02/18  0603 06/01/18  0632 05/31/18  0431   SODIUM mmol/L 127* 125* 129*   POTASSIUM mmol/L 4 2 4 1 4 6   CHLORIDE mmol/L 93* 94* 95*   CO2 mmol/L 26 26 27   BUN mg/dL 7 8 7   CREATININE mg/dL 0 54* 0 55* 0 54*   CALCIUM mg/dL 8 0* 7 8* 7 4*   TOTAL PROTEIN g/dL 7 1 6 7 6 7   BILIRUBIN TOTAL mg/dL 1 80* 2 10* 2 50*   ALK PHOS U/L 217* 208* 216*   ALT U/L 41 37 36   AST U/L 105* 103* 122*   GLUCOSE RANDOM mg/dL 80 85 80         Phosphorus: No results found for: PHOS  Magnesium:   Lab Results   Component Value Date    MG 1 3 (L) 06/02/2018     Urinalysis: No results found for: COLORU, CLARITYU, SPECGRAV, PHUR, LEUKOCYTESUR, NITRITE, PROTEINUA, GLUCOSEU, KETONESU, BILIRUBINUR, BLOODU  Ionized Calcium: No results found for: CAION  Coagulation: No results found for: PT, INR, APTT  Troponin: No results found for: TROPONINI  ABG: No results found for: PHART, GBU5YWH, PO2ART, OUS4TQV, B7ATJMJL, BEART, SOURCE  Radiology review:     IMAGING  Procedure: Ct Tibia Fibula Right W Contrast    Result Date: 6/2/2018  Narrative: CT right tibia-fibula with IV contrast INDICATION: Lower leg erythema, swelling, cellulitis suspected COMPARISON: None  TECHNIQUE: CT examination of the right tibia-fibula was performed  This examination, like all CT scans performed in the [facility], was performed utilizing techniques to minimize radiation dose exposure, including the use of iterative reconstruction and automated exposure control software    Sagittal and coronal two dimensional reconstructed images were also submitted for interpretation  IV Contrast: 100 mL of iohexol (OMNIPAQUE) Rad dose  251 61 mGy-cm FINDINGS: OSSEOUS STRUCTURES:  No fracture, dislocation or destructive osseous lesion  VISUALIZED MUSCULATURE:  Unremarkable  SOFT TISSUES:  There is diffuse subcutaneous edema in keeping with history of cellulitis  There are 4 small subcutaneous abscesses in leg: The most superior measures 0 9 x 0 5 x 1 1 cm (series 3 image 113 and series 500 image 41 )  The next measures 2 1 x 0 8 x 1 9 cm (series 3 image 123 and series 500 image 41 ) The next measures 0 6 x 0 4 x 1 2 cm (series 3 image 133 and series 500 image 41 ) The most inferior measures 1 3 x 0 6 x 1 7 cm (series 3 image 148 and series 500 image 52 ) OTHER PERTINENT FINDINGS:  None  Impression: Diffuse subcutaneous edema consistent with cellulitis, with 4 small subcutaneous abscesses as described above  I personally discussed this study with Armani Clement on 6/2/2018 at 8:58 AM  Workstation performed: [id number]     Procedure: Vas Lower Limb Venous Duplex Study, Unilateral/limited    Result Date: 6/2/2018  Narrative:  [facility] REPORT CLINICAL: Indications: Patient states that he has been experiencing right calf pain and swelling for one week  Patient does not have a history of DVT and does not take a blood thinner  Operative History: Cervical spine surgery Clinical: Right Lower Limb There is complaint of pain and edema  FINDINGS:  Segment  Right            Left              Impression       Impression       CFV      Normal (Patent)  Normal (Patent)     CONCLUSION:  Impression: RIGHT LOWER LIMB No evidence of acute or chronic deep vein thrombosis   No evidence of superficial thrombophlebitis noted  Doppler evaluation shows a normal response to augmentation maneuvers  Popliteal, posterior tibial and anterior tibial arterial Doppler waveforms are triphasic and hyperemic   Tech Note: There is an echogenic structure located in the inguinal region, consistent with enlarged lymph nodes  LEFT LOWER LIMB LIMITED Evaluation shows no evidence of thrombus in the common femoral vein  Doppler evaluation shows a normal response to augmentation maneuvers    SIGNATURE: Electronically Signed by: Stephanie President on 2018-06-02 10:28:41 AM      Current Facility-Administered Medications   Medication Dose Route Frequency    albuterol inhalation solution 2 5 mg  2 5 mg Nebulization Q6H PRN    cholecalciferol (VITAMIN D3) tablet 1,000 Units  1,000 Units Oral Daily    cyanocobalamin (VITAMIN B-12) tablet 100 mcg  100 mcg Oral Daily    diphenhydrAMINE (BENADRYL) injection 25 mg  25 mg Intravenous Q6H PRN    enoxaparin (LOVENOX) subcutaneous injection 40 mg  40 mg Subcutaneous Z58P    folic acid (FOLVITE) tablet 1 mg  1 mg Oral Daily    gabapentin (NEURONTIN) capsule 300 mg  300 mg Oral TID    levETIRAcetam (KEPPRA) tablet 1,500 mg  1,500 mg Oral Q12H [address]    levofloxacin (LEVAQUIN) IVPB (premix) 750 mg  750 mg Intravenous Q24H    lisinopril (ZESTRIL) tablet 10 mg  10 mg Oral Daily    LORazepam (ATIVAN) 2 mg/mL injection 1 mg  1 mg Intravenous Q4H PRN    LORazepam (ATIVAN) tablet 0 5 mg  0 5 mg Oral TID    magnesium oxide (MAG-OX) tablet 800 mg  800 mg Oral BID    multivitamin-minerals (CENTRUM) tablet 1 tablet  1 tablet Oral Daily    nicotine (NICODERM CQ) 7 mg/24hr TD 24 hr patch 1 patch  1 patch Transdermal Daily    ondansetron (ZOFRAN) injection 4 mg  4 mg Intravenous Q6H PRN    oxyCODONE (ROXICODONE) IR tablet 5 mg  5 mg Oral BID PRN    potassium-sodium phosphateS (K-PHOS,PHOSPHA 250) -250 mg tablet 1 tablet  1 tablet Oral 4x Daily (with meals and at bedtime)    thiamine (VITAMIN B1) tablet 100 mg  100 mg Oral Daily    vancomycin (VANCOCIN) 1,500 mg in sodium chloride 0 9 % 500 mL IVPB  1,500 mg Intravenous Q12H    vancomycin (VANCOCIN) oral solution 125 mg  125 mg Oral Q12H [address]     Medications Discontinued During This Encounter   Medication Reason    Magnesium Oxide 400 MG CAPS     thiamine 100 MG tablet     folic acid (FOLVITE) tablet 1 mg Duplicate order    multivitamin stress formula tablet 1 tablet     LORazepam (ATIVAN) 2 mg/mL injection 0 5 mg     LORazepam (ATIVAN) 2 mg/mL injection 1 mg     levETIRAcetam (KEPPRA) 1,500 mg in sodium chloride 0 9 % 100 mL IVPB     magnesium sulfate 4 g/100 mL IVPB (premix) 4 g Alternate therapy    LORazepam (ATIVAN) 2 mg/mL injection 1 mg     potassium chloride (K-DUR,KLOR-CON) CR tablet 40 mEq     HYDROmorphone (DILAUDID) injection 0 5 mg     oxyCODONE (ROXICODONE) IR tablet 5 mg     magnesium sulfate 4 g/100 mL IVPB (premix) 4 g     magnesium sulfate 2 g/50 mL IVPB (premix) 2 g     sodium chloride 0 9 % infusion     LORazepam (ATIVAN) tablet 1 mg     magnesium oxide (MAG-OX) tablet 400 mg     ceFAZolin (ANCEF) IVPB (premix) 1,000 mg     vancomycin (VANCOCIN) 1,250 mg in sodium chloride 0 9 % 250 mL IVPB        LORA Robles

## 2018-06-02 NOTE — ASSESSMENT & PLAN NOTE
Stable  GI evaluated, patient not candidate for steroids  Continue to monitor LFTs and INR, ammonia level within normal limits  Counseling on total alcohol cessation

## 2018-06-03 LAB
ALBUMIN SERPL BCP-MCNC: 2 G/DL (ref 3.5–5)
ALP SERPL-CCNC: 211 U/L (ref 46–116)
ALT SERPL W P-5'-P-CCNC: 43 U/L (ref 12–78)
ANION GAP SERPL CALCULATED.3IONS-SCNC: 7 MMOL/L (ref 4–13)
AST SERPL W P-5'-P-CCNC: 121 U/L (ref 5–45)
BASOPHILS # BLD AUTO: 0.16 THOUSANDS/ΜL (ref 0–0.1)
BASOPHILS NFR BLD AUTO: 2 % (ref 0–1)
BILIRUB SERPL-MCNC: 1.6 MG/DL (ref 0.2–1)
BUN SERPL-MCNC: 7 MG/DL (ref 5–25)
CALCIUM SERPL-MCNC: 8.3 MG/DL (ref 8.3–10.1)
CHLORIDE SERPL-SCNC: 94 MMOL/L (ref 100–108)
CO2 SERPL-SCNC: 27 MMOL/L (ref 21–32)
CREAT SERPL-MCNC: 0.63 MG/DL (ref 0.6–1.3)
EOSINOPHIL # BLD AUTO: 0.28 THOUSAND/ΜL (ref 0–0.61)
EOSINOPHIL NFR BLD AUTO: 3 % (ref 0–6)
ERYTHROCYTE [DISTWIDTH] IN BLOOD BY AUTOMATED COUNT: 20.5 % (ref 11.6–15.1)
GFR SERPL CREATININE-BSD FRML MDRD: 114 ML/MIN/1.73SQ M
GLUCOSE SERPL-MCNC: 87 MG/DL (ref 65–140)
HCT VFR BLD AUTO: 24.9 % (ref 36.5–49.3)
HGB BLD-MCNC: 8.1 G/DL (ref 12–17)
INR PPP: 1.24 (ref 0.86–1.17)
LYMPHOCYTES # BLD AUTO: 1.89 THOUSANDS/ΜL (ref 0.6–4.47)
LYMPHOCYTES NFR BLD AUTO: 18 % (ref 14–44)
MAGNESIUM SERPL-MCNC: 1.4 MG/DL (ref 1.6–2.6)
MCH RBC QN AUTO: 31 PG (ref 26.8–34.3)
MCHC RBC AUTO-ENTMCNC: 32.5 G/DL (ref 31.4–37.4)
MCV RBC AUTO: 95 FL (ref 82–98)
MONOCYTES # BLD AUTO: 1.28 THOUSAND/ΜL (ref 0.17–1.22)
MONOCYTES NFR BLD AUTO: 12 % (ref 4–12)
NEUTROPHILS # BLD AUTO: 6.84 THOUSANDS/ΜL (ref 1.85–7.62)
NEUTS SEG NFR BLD AUTO: 66 % (ref 43–75)
PLATELET # BLD AUTO: 291 THOUSANDS/UL (ref 149–390)
PMV BLD AUTO: 10.3 FL (ref 8.9–12.7)
POTASSIUM SERPL-SCNC: 3.9 MMOL/L (ref 3.5–5.3)
PROT SERPL-MCNC: 7.1 G/DL (ref 6.4–8.2)
PROTHROMBIN TIME: 15 SECONDS (ref 11.8–14.2)
RBC # BLD AUTO: 2.61 MILLION/UL (ref 3.88–5.62)
SODIUM SERPL-SCNC: 128 MMOL/L (ref 136–145)
VANCOMYCIN TROUGH SERPL-MCNC: 12.6 UG/ML (ref 10–20)
WBC # BLD AUTO: 10.45 THOUSAND/UL (ref 4.31–10.16)

## 2018-06-03 PROCEDURE — 80202 ASSAY OF VANCOMYCIN: CPT | Performed by: PHYSICIAN ASSISTANT

## 2018-06-03 PROCEDURE — 85025 COMPLETE CBC W/AUTO DIFF WBC: CPT | Performed by: FAMILY MEDICINE

## 2018-06-03 PROCEDURE — 10060 I&D ABSCESS SIMPLE/SINGLE: CPT | Performed by: SURGERY

## 2018-06-03 PROCEDURE — 99233 SBSQ HOSP IP/OBS HIGH 50: CPT | Performed by: FAMILY MEDICINE

## 2018-06-03 PROCEDURE — 80053 COMPREHEN METABOLIC PANEL: CPT | Performed by: FAMILY MEDICINE

## 2018-06-03 PROCEDURE — 85610 PROTHROMBIN TIME: CPT | Performed by: FAMILY MEDICINE

## 2018-06-03 PROCEDURE — 83735 ASSAY OF MAGNESIUM: CPT | Performed by: FAMILY MEDICINE

## 2018-06-03 RX ADMIN — VANCOMYCIN HYDROCHLORIDE 1500 MG: 1 INJECTION, POWDER, LYOPHILIZED, FOR SOLUTION INTRAVENOUS at 18:17

## 2018-06-03 RX ADMIN — VANCOMYCIN 125 MG: KIT at 21:47

## 2018-06-03 RX ADMIN — LORAZEPAM 0.5 MG: 0.5 TABLET ORAL at 14:09

## 2018-06-03 RX ADMIN — DIBASIC SODIUM PHOSPHATE, MONOBASIC POTASSIUM PHOSPHATE AND MONOBASIC SODIUM PHOSPHATE 1 TABLET: 852; 155; 130 TABLET ORAL at 12:43

## 2018-06-03 RX ADMIN — VANCOMYCIN HYDROCHLORIDE 1500 MG: 1 INJECTION, POWDER, LYOPHILIZED, FOR SOLUTION INTRAVENOUS at 06:52

## 2018-06-03 RX ADMIN — LEVETIRACETAM 1500 MG: 500 TABLET ORAL at 09:00

## 2018-06-03 RX ADMIN — VANCOMYCIN 125 MG: KIT at 09:06

## 2018-06-03 RX ADMIN — DIPHENHYDRAMINE HYDROCHLORIDE 25 MG: 50 INJECTION, SOLUTION INTRAMUSCULAR; INTRAVENOUS at 12:45

## 2018-06-03 RX ADMIN — GABAPENTIN 300 MG: 300 CAPSULE ORAL at 09:00

## 2018-06-03 RX ADMIN — GABAPENTIN 300 MG: 300 CAPSULE ORAL at 21:47

## 2018-06-03 RX ADMIN — THIAMINE HCL TAB 100 MG 100 MG: 100 TAB at 09:00

## 2018-06-03 RX ADMIN — DIBASIC SODIUM PHOSPHATE, MONOBASIC POTASSIUM PHOSPHATE AND MONOBASIC SODIUM PHOSPHATE 1 TABLET: 852; 155; 130 TABLET ORAL at 09:00

## 2018-06-03 RX ADMIN — LISINOPRIL 10 MG: 10 TABLET ORAL at 09:00

## 2018-06-03 RX ADMIN — NICOTINE 1 PATCH: 7 PATCH, EXTENDED RELEASE TRANSDERMAL at 09:01

## 2018-06-03 RX ADMIN — FOLIC ACID 1 MG: 1 TABLET ORAL at 09:00

## 2018-06-03 RX ADMIN — LEVETIRACETAM 1500 MG: 500 TABLET ORAL at 21:47

## 2018-06-03 RX ADMIN — LORAZEPAM 0.5 MG: 0.5 TABLET ORAL at 09:00

## 2018-06-03 RX ADMIN — Medication 800 MG: at 17:20

## 2018-06-03 RX ADMIN — DIBASIC SODIUM PHOSPHATE, MONOBASIC POTASSIUM PHOSPHATE AND MONOBASIC SODIUM PHOSPHATE 1 TABLET: 852; 155; 130 TABLET ORAL at 21:47

## 2018-06-03 RX ADMIN — LORAZEPAM 0.5 MG: 0.5 TABLET ORAL at 19:32

## 2018-06-03 RX ADMIN — LEVOFLOXACIN 750 MG: 5 INJECTION, SOLUTION INTRAVENOUS at 00:12

## 2018-06-03 RX ADMIN — GABAPENTIN 300 MG: 300 CAPSULE ORAL at 17:20

## 2018-06-03 RX ADMIN — Medication 800 MG: at 09:00

## 2018-06-03 RX ADMIN — LEVOFLOXACIN 750 MG: 5 INJECTION, SOLUTION INTRAVENOUS at 23:42

## 2018-06-03 RX ADMIN — DIBASIC SODIUM PHOSPHATE, MONOBASIC POTASSIUM PHOSPHATE AND MONOBASIC SODIUM PHOSPHATE 1 TABLET: 852; 155; 130 TABLET ORAL at 17:20

## 2018-06-03 RX ADMIN — ENOXAPARIN SODIUM 40 MG: 40 INJECTION, SOLUTION INTRAVENOUS; SUBCUTANEOUS at 21:48

## 2018-06-03 RX ADMIN — VITAMIN D, TAB 1000IU (100/BT) 1000 UNITS: 25 TAB at 09:01

## 2018-06-03 RX ADMIN — MULTIPLE VITAMINS W/ MINERALS TAB 1 TABLET: TAB at 09:00

## 2018-06-03 RX ADMIN — VITAM B12 100 MCG: 100 TAB at 09:01

## 2018-06-03 NOTE — PROGRESS NOTES
Progress Note - Abner Began 1965, 47 y o  male MRN: [id number]    Unit/Bed#: 422-01 Encounter: [id number]    Primary Care Provider: Mary Ann Garcia PA-C   Date and time admitted to hospital: 5/22/2018  3:08 PM        Abscess of right leg   Assessment & Plan    S/p I&D by Surgery 6/2, POD 1  4 superficial abscesses RLE noted on CT RLE with contrast  Mild leukocytosis, afebrile  F/u wound cultures  Continue with Vanco/Levaquin  Discussed with ID        * Cellulitis of right lower extremity   Assessment & Plan    Has been resistant to antibiotic management with development of superficial abscesses   Surgery input appreciated, patient s/p I&D 6/2  Will continue Vanco/Levaquin, along with PO Vanco for c diff prophylaxis, this was discussed with ID and Podiatry, input appreciated  Wound culture growing Staph that is sensitive to Ancef - patient received 1 week of therapy - but may have been insufficient wound cultures given worsening erythema, wound cultures s/p I&D pending  Venous Dopplers of right lower extremity negative for DVT        Alcoholic hepatitis without ascites   Assessment & Plan    Stable  GI evaluated, patient not candidate for steroids  Continue to monitor LFTs and INR, ammonia level within normal limits  Counseling on total alcohol cessation        Hypomagnesemia   Assessment & Plan    Secondary to alcohol abuse and degree of malnutrition  Mg 1 4 today  Nephrology input appreciated  Continue with replacement  Monitor Mg daily          Hyponatremia   Assessment & Plan    Sodium 128 today, patient remains asymptomatic  Acute on chronic, secondary to beer potomania  Nephrology input appreciated  Continue fluid restriction of 1500 cc per 24 hours  Monitor Na level daily        Alcohol dependence ([facility] [address] )   Assessment & Plan    Continue vitamin supplementation  Continue to replete electrolytes  Continue P  O  Ativan 2 5 mg p o  t i d   IV Ativan p r n    Psychiatry input appreciated, noted recommendations to start naltrexone 25 mg daily to help prevent return to heavy drinking after discharge  Plan to discharge to SNF, patient undergoing optioning process due to prior history of inpatient psychiatric hospitalizations  Patient does not meet criteria for involuntary commitment and is deemed capacitated to make healthcare decisions by Psychiatry         Seizure-like activity [facility])   Assessment & Plan    No recurrence since admission   CT head reviewed  Prolactin is top-normal  Transitioned to p o  Keppra 1500 mg p o  b i d  Possibly related to alcohol withdrawal  Outpatient Neurology follow-up        Malnutrition of moderate degree ([address] )   Assessment & Plan    Malnutrition Findings:         Secondary to alcohol abuse    BMI Findings: Body mass index is 22 93 kg/m²  Consult nutrition        Dietary folate deficiency anemia   Assessment & Plan    GI input appreciated he will require outpatient follow-up for EGD and colonoscopy  Hemoglobin stable; had 1 u PRBC transfusion at time of admission  Continue folate replacement  Monitor CBC daily  Discussed with GI, as H&H has been stable and due to marked hyponatremia no urgent procedures are recommended        Tobacco use   Assessment & Plan    -nicotine patch  -smoking cessation counseling          VTE Pharmacologic Prophylaxis:   Pharmacologic: Enoxaparin (Lovenox)  Mechanical VTE Prophylaxis in Place: Yes    Discussions with Specialists or Other Care Team Provider: Surgery, ID    Education and Discussions with Family / Patient: sister    Time Spent for Care: 45 minutes  More than 50% of total time spent on counseling and coordination of care as described above      Current Length of Stay: 12 day(s)    Current Patient Status: Inpatient   Certification Statement: The patient will continue to require additional inpatient hospital stay due to need for close monitoring, infectious workup, "optioning" process    Discharge Plan: TBD    Code Status: Level 1 - Full Code      Subjective:   Patient seen and examined  He states his RLE pain is improved  Had I&D yesterday of large abscess  He has been afebrile  No o/n events  Denies abdominal pain, nausea, vomiting, diarrhea or constipation  Objective:     Vitals:   Temp (24hrs), Av 8 °F (37 1 °C), Min:98 1 °F (36 7 °C), Max:99 2 °F (37 3 °C)    HR:  [86-95] 86  Resp:  [18-20] 18  BP: (119-124)/(65-77) 123/65  SpO2:  [95 %-98 %] 95 %  Body mass index is 22 93 kg/m²  Input and Output Summary (last 24 hours): Intake/Output Summary (Last 24 hours) at 18 1219  Last data filed at 18 0900   Gross per 24 hour   Intake             1930 ml   Output             3515 ml   Net            -1585 ml       Physical Exam:     Physical Exam   Constitutional: He is oriented to person, place, and time  He is sleeping  He is easily aroused  No distress  HENT:   Head: Normocephalic and atraumatic  Eyes: Scleral icterus is present  Neck: No JVD present  Cardiovascular: Normal rate and regular rhythm  No murmur heard  Pulmonary/Chest: Effort normal  No respiratory distress  He has no wheezes  He has no rales  Abdominal: Soft  He exhibits distension  There is no tenderness  There is no guarding  Musculoskeletal: He exhibits no edema  Neurological: He is alert, oriented to person, place, and time and easily aroused  Skin: There is erythema (RLE dressing dry and intact)  Psychiatric: He has a normal mood and affect          Additional Data:     Labs:      Results from last 7 days  Lab Units 18  0500   WBC Thousand/uL 10 45*   HEMOGLOBIN g/dL 8 1*   HEMATOCRIT % 24 9*   PLATELETS Thousands/uL 291   NEUTROS PCT % 66   LYMPHS PCT % 18   MONOS PCT % 12   EOS PCT % 3       Results from last 7 days  Lab Units 18  0500   SODIUM mmol/L 128*   POTASSIUM mmol/L 3 9   CHLORIDE mmol/L 94*   CO2 mmol/L 27   BUN mg/dL 7   CREATININE mg/dL 0 63   CALCIUM mg/dL 8 3   TOTAL PROTEIN g/dL 7 1   BILIRUBIN TOTAL mg/dL 1 60*   ALK PHOS U/L 211*   ALT U/L 43   AST U/L 121*   GLUCOSE RANDOM mg/dL 87       Results from last 7 days  Lab Units 06/03/18  0500   INR  1 24*                 * I Have Reviewed All Lab Data Listed Above  * Additional Pertinent Lab Tests Reviewed:  [facility] [address] Admission Reviewed    Imaging:    Imaging Reports Reviewed Today Include: CT RLE    Recent Cultures (last 7 days):           Last 24 Hours Medication List:     Current Facility-Administered Medications:  albuterol 2 5 mg Nebulization Q6H PRN Shraddha Wagoner, DO    cholecalciferol 1,000 Units Oral Daily Shraddha Wagoner, DO    cyanocobalamin 100 mcg Oral Daily Shraddha Wagoner, DO    diphenhydrAMINE 25 mg Intravenous Q6H PRN Chapo Forde MD    enoxaparin 40 mg Subcutaneous Q24H Shraddha Wagoner, DO    folic acid 1 mg Oral Daily Shraddha Wagoner, DO    gabapentin 300 mg Oral TID Shraddha Wagoner, DO    levETIRAcetam 1,500 mg Oral Q12H [address] Alyse Long MD    levofloxacin 750 mg Intravenous Q24H Chap[place] Forde MD Last Rate: Stopped (06/03/18 0142)   lisinopril 10 mg Oral Daily Shradhda Wagoner, DO    LORazepam 1 mg Intravenous Q4H PRN Shraddha Wagoner, DO    LORazepam 0 5 mg Oral TID Alyse Long MD    magnesium oxide 800 mg Oral BID Alyse Long MD    multivitamin-minerals 1 tablet Oral Daily Shraddha Wagoner, DO    nicotine 1 patch Transdermal Daily Shraddha Wagoner, DO    ondansetron 4 mg Intravenous Q6H PRN Shraddha Wagoner, DO    oxyCODONE 5 mg Oral BID PRN Alyse Long MD    potassium-sodium phosphateS 1 tablet Oral 4x Daily (with meals and at bedtime) Alyse Long MD    thiamine 100 mg Oral Daily Shraddha Wagoner, DO    vancomycin 1,500 mg Intravenous Q12H Chapo Forde MD Last Rate: Stopped (06/03/18 0852)   vancomycin 125 mg Oral Q12H Bossman Mays MD         Today, Patient Was Seen By: Curtis Rolon MD    ** Please Note: Dictation voice to text software may have been used in the creation of this document   **

## 2018-06-03 NOTE — ASSESSMENT & PLAN NOTE
Sodium 128 today, patient remains asymptomatic  Acute on chronic, secondary to robi [place]  Nephrology input appreciated  Continue fluid restriction of 1500 cc per 24 hours  Monitor Na level daily

## 2018-06-03 NOTE — CONSULTS
Consultation - General Surgery   Xochitl Souza 47 y o  male MRN: [id number]  Unit/Bed#: 908-57 Encounter: [id number]    Patient seen examined at bedside at approximately 4:00 p m  on June 2, 2018    Assessment/Plan     Assessment:  52-year-old male with history of multiple medical problems currently admitted with right lower extremity cellulitis, with interval worsening and development of a right lower extremity abscesses  CT scan showing multiple abscesses of right lower extremity largest being extract approximately 2 cm and then multiple other small subcentimeter collections  On exam there is an anterior collection likely consistent with a 2 cm collection which is purely fluctuant on exam, however the remainder of the small subcentimeter collections are not fully appreciated on exam     Plan:  -IV antibiotics  -will undergo incision and drainage of the larger abscess wound at bedside with cultures  -pain control   -right lower extremity elevation  -remainder of comorbidities to be managed by primary medical team     History of Present Illness     HPI:  Xochitl Souza is a 47 y o  male with a history of multiple medical problems including alcohol abuse, COPD, hypertension, perforated ulcer status post repair, admitted with right lower extremity pain and noted cellulitis  Patient has been in the hospital for approximately 10 days on  IV antibiotics initially was found to be improving however the cellulitis  has gotten worse and then was noted to have what was thought to be possible collection within the subcutaneous tissue  He underwent CT scan which subsequently showed multiple collections likely consistent with multiple abscesses of the right lower extremity the largest of which is 2 cm the remainder which were subcentimeter  Currently denies any nausea vomiting  No fevers or chills    No significant right lower extremity pain per Se as per the patient however he does state he has significant neuropathy and therefore does not feel much of anything  Denies any significant worsening shortness of breath or chest pain  Inpatient consult to Acute Care Surgery  Consult performed by: Anup Boothe ordered by: [place]          Review of Systems    A 10 point review of systems was conducted, all negative except as noted above HPI  Historical Information   Past Medical History:   Diagnosis Date    Alcohol abuse     Bowel obstruction (HCC)     Bowel perforation (HCC)     Cardiac disease     Continuous chronic alcoholism ([facility] [address] ) 10/5/2017    COPD (chronic obstructive pulmonary disease) (HCC)     History of shoulder surgery     Right shoulder    History of transfusion     Hx of cervical spine surgery     Hypertension     Incisional hernia 10/8/2017    MI, old     Mitral regurgitation     Psychiatric disorder     Seizures ([facility] [address] )      Past Surgical History:   Procedure Laterality Date    BACK SURGERY      ESOPHAGOGASTRODUODENOSCOPY N/A 11/28/2016    Procedure: ESOPHAGOGASTRODUODENOSCOPY (EGD); Surgeon: Jaquan Colunga MD;  Location:  GI LAB;   Service:     LAPAROTOMY N/A 10/25/2016    Procedure: LAPAROTOMY EXPLORATORY;  Surgeon: Ham Rooney MD;  Location: [facility];  Service:    [facility] [address]     SMALL INTESTINE SURGERY       Social History   History   Alcohol Use    3 6 oz/week    6 Cans of beer per week     Comment: 6 25oz cans a day     History   Drug Use No     History   Smoking Status    Current Every Day Smoker    Packs/day: 2 00    Years: 15 00   Smokeless Tobacco    Never Used     Family History: non-contributory    Meds/Allergies   all current active meds have been reviewed  No Known Allergies    Objective   First Vitals:   Blood Pressure: (!) 85/67 (05/22/18 1512)  Pulse: 99 (05/22/18 1512)  Temperature: 97 7 °F (36 5 °C) (05/22/18 1512)  Temp Source: Temporal (05/22/18 1512)  Respirations: 16 (05/22/18 1512)  Height: 5' 5" (165 1 cm) (05/22/18 2010)  Weight - Scale: 59 9 kg (132 lb 0 9 oz) (05/22/18 1512)  SpO2: 99 % (05/22/18 1512)    Current Vitals:   Blood Pressure: 123/65 (06/03/18 0729)  Pulse: 86 (06/03/18 0729)  Temperature: 98 6 °F (37 °C) (06/03/18 0729)  Temp Source: Temporal (06/03/18 0729)  Respirations: 18 (06/03/18 0729)  Height: 5' 5" (165 1 cm) (05/22/18 2010)  Weight - Scale: 62 5 kg (137 lb 12 6 oz) (06/03/18 0559)  SpO2: 95 % (06/03/18 0729)      Intake/Output Summary (Last 24 hours) at 06/03/18 1118  Last data filed at 06/03/18 0900   Gross per 24 hour   Intake              780 ml   Output             3515 ml   Net            -2735 ml       Invasive Devices     Peripheral Intravenous Line            Peripheral IV 06/02/18 Right Forearm less than 1 day                Physical Exam  General:  No acute distress, resting comfortably  HEENT:  Normocephalic, atraumatic, trachea midline  CV:  No regular rate and rhythm  Pulmonary:  Effort normal, no respiratory distress, no expiratory wheezes heard  GI:  Abdomen soft, nontender, noted caput medusa, noted previous scar and what appears to be likely diastasis recti as noted on previous CT scans  MSK:  No significant edema there is significant right lower extremity erythema  Neurologic:  Alert oriented x3, cranial 2-12 grossly intact  Skin:  There is significant blanching erythema the right lower extremity and and anterior palpable fluctuant collection consistent with subcutaneous abscess  Psych:  Mood and affect appropriate        Lab Results: I have personally reviewed pertinent lab results  Imaging: I have personally reviewed pertinent films in PACS  EKG, Pathology, and Other Studies: I have personally reviewed pertinent reports

## 2018-06-03 NOTE — PLAN OF CARE
Problem: Potential for Falls  Goal: Patient will remain free of falls  INTERVENTIONS:  - Assess patient frequently for physical needs  -  Identify cognitive and physical deficits and behaviors that affect risk of falls    -  [place] fall precautions as indicated by assessment   - Educate patient/family on patient safety including physical limitations  - Instruct patient to call for assistance with activity based on assessment  - Modify environment to reduce risk of injury  - Consider OT/PT consult to assist with strengthening/mobility   Outcome: Progressing      Problem: PAIN - ADULT  Goal: Verbalizes/displays adequate comfort level or baseline comfort level  Interventions:  - Encourage patient to monitor pain and request assistance  - Assess pain using appropriate pain scale  - Administer analgesics based on type and severity of pain and evaluate response  - Implement non-pharmacological measures as appropriate and evaluate response  - Consider cultural and social influences on pain and pain management  - Notify physician/advanced practitioner if interventions unsuccessful or patient reports new pain   Outcome: Progressing      Problem: INFECTION - ADULT  Goal: Absence or prevention of progression during hospitalization  INTERVENTIONS:  - Assess and monitor for signs and symptoms of infection  - Monitor lab/diagnostic results    - [place] appropriate cooling/warming therapies per order  - Administer medications as ordered  - Instruct and encourage patient and family to use good hand hygiene technique  - Identify and instruct in appropriate isolation precautions for identified infection/condition   Outcome: Progressing      Problem: SAFETY ADULT  Goal: Maintain or return to baseline ADL function  INTERVENTIONS:  -  Assess patient's ability to carry out ADLs; assess patient's baseline for ADL function and identify physical deficits which impact ability to perform ADLs (bathing, care of mouth/teeth, toileting, grooming, dressing, etc )  - Assess/evaluate cause of self-care deficits   - Assess range of motion  - Assess patient's mobility; develop plan if impaired  - Assess patient's need for assistive devices and provide as appropriate  - Encourage maximum independence but intervene and supervise when necessary  ¯ Involve family in performance of ADLs  ¯ Assess for home care needs following discharge   ¯ Request OT consult to assist with ADL evaluation and planning for discharge  ¯ Provide patient education as appropriate   Outcome: Progressing    Goal: Maintain or return mobility status to optimal level  INTERVENTIONS:  - Assess patient's baseline mobility status (ambulation, transfers, stairs, etc )    - Identify cognitive and physical deficits and behaviors that affect mobility  - Identify mobility aids required to assist with transfers and/or ambulation (gait belt, sit-to-stand, lift, walker, cane, etc )  - [place] fall precautions as indicated by assessment  - Record patient progress and toleration of activity level on Mobility SBAR; progress patient to next Phase/Stage  - Instruct patient to call for assistance with activity based on assessment  - Request Rehabilitation consult to assist with strengthening/weightbearing, etc    Outcome: Progressing      Problem: DISCHARGE PLANNING  Goal: Discharge to home or other facility with appropriate resources  INTERVENTIONS:  - Identify barriers to discharge w/patient and caregiver  - Arrange for needed discharge resources and transportation as appropriate  - Identify discharge learning needs (meds, wound care, etc )  - Arrange for interpretive services to assist at discharge as needed  - Refer to Case Management Department for coordinating discharge planning if the patient needs post-hospital services based on physician/advanced practitioner order or complex needs related to functional status, cognitive ability, or social support system   Outcome: Progressing      Problem: Knowledge Deficit  Goal: Patient/family/caregiver demonstrates understanding of disease process, treatment plan, medications, and discharge instructions  Complete learning assessment and assess knowledge base    Interventions:  - Provide teaching at level of understanding  - Provide teaching via preferred learning methods   Outcome: Progressing      Problem: Prexisting or High Potential for Compromised Skin Integrity  Goal: Skin integrity is maintained or improved  INTERVENTIONS:  - Identify patients at risk for skin breakdown  - Assess and monitor skin integrity  - Assess and monitor nutrition and hydration status  - Monitor labs (i e  albumin)  - Assess for incontinence   - Turn and reposition patient  - Assist with mobility/ambulation  - Relieve pressure over bony prominences  - Avoid friction and shearing  - Provide appropriate hygiene as needed including keeping skin clean and dry  - Evaluate need for skin moisturizer/barrier cream  - Collaborate with interdisciplinary team (i e  Nutrition, Rehabilitation, etc )   - Patient/family teaching   Outcome: Progressing      Problem: DISCHARGE PLANNING - CARE MANAGEMENT  Goal: Discharge to post-acute care or home with appropriate resources  INTERVENTIONS:  - Conduct assessment to determine patient/family and health care team treatment goals, and need for post-acute services based on payer coverage, community resources, and patient preferences, and barriers to discharge  - Address psychosocial, clinical, and financial barriers to discharge as identified in assessment in conjunction with the patient/family and health care team  - Arrange appropriate level of post-acute services according to patient's   needs and preference and payer coverage in collaboration with the physician and health care team  - Communicate with and update the patient/family, physician, and health care team regarding progress on the discharge plan  - Arrange appropriate transportation to post-acute venues   Outcome: Progressing      Problem: Nutrition/Hydration-ADULT  Goal: Nutrient/Hydration intake appropriate for improving, restoring or maintaining nutritional needs  Monitor and assess patient's nutrition/hydration status for malnutrition (ex- brittle hair, bruises, dry skin, pale skin and conjunctiva, muscle wasting, smooth red tongue, and disorientation)  Collaborate with interdisciplinary team and initiate plan and interventions as ordered  Monitor patient's weight and dietary intake as ordered or per policy  Utilize nutrition screening tool and intervene per policy  Determine patient's food preferences and provide high-protein, high-caloric foods as appropriate       INTERVENTIONS:  - Monitor oral intake, urinary output, labs, and treatment plans  - Assess nutrition and hydration status and recommend course of action  - Evaluate amount of meals eaten  - Assist patient with eating if necessary   - Allow adequate time for meals  - Recommend/ encourage appropriate diets, oral nutritional supplements, and vitamin/mineral supplements  - Order, calculate, and assess calorie counts as needed  - Recommend, monitor, and adjust tube feedings and TPN/PPN based on assessed needs  - Assess need for intravenous fluids  - Provide specific nutrition/hydration education as appropriate  - Include patient/family/caregiver in decisions related to nutrition   Outcome: Progressing

## 2018-06-03 NOTE — ASSESSMENT & PLAN NOTE
Malnutrition Findings:         Secondary to alcohol abuse    BMI Findings: Body mass index is 22 93 kg/m²     Consult nutrition

## 2018-06-03 NOTE — PROCEDURES
Surgeon: Phuong Drew DO     Assistants: none    Anesthesia: Local anesthesia 1% buffered lidocaine    ASA Class: 3    Specimen:  Cultures    Complication:  None    Blood loss:  Minimal    Findings:  Subcutaneous abscess with purulent material    Description of procedure: The patient is noted to have worsening right lower extremity erythema with noted fluctuance of the anterior right lower extremity  CT scan showing an approximately 2 cm abscess  Patient agreed to have this incised and drained at bedside  The procedure self including all associated risks and benefits were discussed the patient great length  The patient verbalized understand these risks is willing to proceed, consent was signed  Prior to procedure time-out was held verifying correct patient, procedure, position, and site  The area was then cleaned with Betadine solution  It was anesthetized with approximately 2 cc of 1% lidocaine with epinephrine  A cruciate incision measuring approximately 1 cm was made using 11 blade scalpel  At that time blood and purulent material were noted  Loculations were then broken up  Additional purulence was noted a culture was taken  The abscess cavity was then irrigated with normal saline and then packed with quarter-inch plain packing  It was covered with a dry dressing  Patient tolerated procedure well

## 2018-06-03 NOTE — ASSESSMENT & PLAN NOTE
S/p I&D by Surgery 6/2, POD 1  4 superficial abscesses RLE noted on CT RLE with contrast  Mild leukocytosis, afebrile  F/u wound cultures  Continue with Vanco/Levaquin  Discussed with ID

## 2018-06-03 NOTE — ASSESSMENT & PLAN NOTE
Secondary to alcohol abuse and degree of malnutrition  Mg 1 4 today  Nephrology input appreciated  Continue with replacement  Monitor Mg daily

## 2018-06-03 NOTE — ASSESSMENT & PLAN NOTE
GI input appreciated he will require outpatient follow-up for EGD and colonoscopy  Hemoglobin stable; had 1 u PRBC transfusion at time of admission  Continue folate replacement  Monitor CBC daily  Discussed with GI, as H&H has been stable and due to marked hyponatremia no urgent procedures are recommended

## 2018-06-03 NOTE — ASSESSMENT & PLAN NOTE
Has been resistant to antibiotic management with development of superficial abscesses   Surgery input appreciated, patient s/p I&D 6/2  Will continue Vanco/Levaquin, along with PO Vanco for c diff prophylaxis, this was discussed with ID and Podiatry, input appreciated  Wound culture growing Staph that is sensitive to Ancef - patient received 1 week of therapy - but may have been insufficient wound cultures given worsening erythema, wound cultures s/p I&D pending  Venous Dopplers of right lower extremity negative for DVT

## 2018-06-03 NOTE — PROGRESS NOTES
Progress Note - General Surgery   Margarito Toth 47 y o  male MRN: [id number]  Unit/Bed#: 172-27 Encounter: [id number]    Assessment:  71-year-old male with history of multiple medical problems currently admitted with right lower extremity cellulitis, with interval worsening and development of a right lower extremity abscesses, now status post incision and drainage of right lower extremity anterior subcutaneous abscess  Plan:  -awake wound culture results  -continue IV antibiotics  -pain control  -packing was removed at bedside  - local wound care, cleanse with soap water and daily dressing changes     -leg elevation    Subjective/Objective   Chief Complaint:  Right lower extremity pain    Subjective:   Complains of some right lower extremityDiscomfort  About the same as yesterday  No new complaints  Objective:     Blood pressure 123/65, pulse 86, temperature 98 6 °F (37 °C), temperature source Temporal, resp  rate 18, height 5' 5" (1 651 m), weight 62 5 kg (137 lb 12 6 oz), SpO2 95 %  ,Body mass index is 22 93 kg/m²  Intake/Output Summary (Last 24 hours) at 06/03/18 1517  Last data filed at 06/03/18 1337   Gross per 24 hour   Intake             1930 ml   Output             3015 ml   Net            -1085 ml       Invasive Devices     Peripheral Intravenous Line            Peripheral IV 06/02/18 Right Forearm less than 1 day                Physical Exam:   General:  No acute distress, resting comfortably  HEENT:  Normocephalic, atraumatic, trachea midline  CV:  No regular rate and rhythm  Pulmonary:  Effort normal, no respiratory distress, no expiratory wheezes heard  GI:  Abdomen soft, nontender, noted caput medusa, noted previous scar and what appears to be likely diastasis recti as noted on previous CT scans    MSK:  No significant edema there is right lower extremity erythema  Neurologic:  Alert oriented x3, cranial 2-12 grossly intact  Skin:  There is significant blanching erythema, although slightly improved of the right lower extremity, abscess cavity with packing in place no active drainage or bleeding  Blood-stained bandage removed and packing was removed  dry bandage  Psych:  Mood and affect appropriate              Lab, Imaging and other studies:  I have personally reviewed pertinent lab results    , CBC:   Lab Results   Component Value Date    WBC 10 45 (H) 06/03/2018    HGB 8 1 (L) 06/03/2018    HCT 24 9 (L) 06/03/2018    MCV 95 06/03/2018     06/03/2018    MCH 31 0 06/03/2018    MCHC 32 5 06/03/2018    RDW 20 5 (H) 06/03/2018    MPV 10 3 06/03/2018   , CMP:   Lab Results   Component Value Date     (L) 06/03/2018    K 3 9 06/03/2018    CL 94 (L) 06/03/2018    CO2 27 06/03/2018    ANIONGAP 7 06/03/2018    BUN 7 06/03/2018    CREATININE 0 63 06/03/2018    GLUCOSE 87 06/03/2018    CALCIUM 8 3 06/03/2018     (H) 06/03/2018    ALT 43 06/03/2018    ALKPHOS 211 (H) 06/03/2018    PROT 7 1 06/03/2018    BILITOT 1 60 (H) 06/03/2018    EGFR 114 06/03/2018   , Coagulation:   Lab Results   Component Value Date    INR 1 24 (H) 06/03/2018   , Urinalysis: No results found for: COLORU, CLARITYU, SPECGRAV, PHUR, LEUKOCYTESUR, NITRITE, PROTEINUA, GLUCOSEU, KETONESU, BILIRUBINUR, BLOODU, Amylase: No results found for: AMYLASE, Lipase: No results found for: LIPASE  VTE Pharmacologic Prophylaxis: Enoxaparin (Lovenox)  VTE Mechanical Prophylaxis: sequential compression device

## 2018-06-04 LAB
ALBUMIN SERPL BCP-MCNC: 2.1 G/DL (ref 3.5–5)
ALP SERPL-CCNC: 201 U/L (ref 46–116)
ALT SERPL W P-5'-P-CCNC: 40 U/L (ref 12–78)
ANION GAP SERPL CALCULATED.3IONS-SCNC: 9 MMOL/L (ref 4–13)
AST SERPL W P-5'-P-CCNC: 102 U/L (ref 5–45)
BASOPHILS # BLD AUTO: 0.12 THOUSANDS/ΜL (ref 0–0.1)
BASOPHILS NFR BLD AUTO: 1 % (ref 0–1)
BILIRUB SERPL-MCNC: 1.5 MG/DL (ref 0.2–1)
BUN SERPL-MCNC: 7 MG/DL (ref 5–25)
CALCIUM SERPL-MCNC: 8.1 MG/DL (ref 8.3–10.1)
CHLORIDE SERPL-SCNC: 94 MMOL/L (ref 100–108)
CO2 SERPL-SCNC: 26 MMOL/L (ref 21–32)
CREAT SERPL-MCNC: 0.6 MG/DL (ref 0.6–1.3)
CREAT UR-MCNC: 23.3 MG/DL
EOSINOPHIL # BLD AUTO: 0.44 THOUSAND/ΜL (ref 0–0.61)
EOSINOPHIL NFR BLD AUTO: 4 % (ref 0–6)
ERYTHROCYTE [DISTWIDTH] IN BLOOD BY AUTOMATED COUNT: 20.2 % (ref 11.6–15.1)
GFR SERPL CREATININE-BSD FRML MDRD: 116 ML/MIN/1.73SQ M
GLUCOSE SERPL-MCNC: 85 MG/DL (ref 65–140)
HCT VFR BLD AUTO: 22.7 % (ref 36.5–49.3)
HEMOCCULT STL QL: NEGATIVE
HGB BLD-MCNC: 7.4 G/DL (ref 12–17)
LYMPHOCYTES # BLD AUTO: 2.08 THOUSANDS/ΜL (ref 0.6–4.47)
LYMPHOCYTES NFR BLD AUTO: 20 % (ref 14–44)
MAGNESIUM SERPL-MCNC: 1.1 MG/DL (ref 1.6–2.6)
MCH RBC QN AUTO: 31.4 PG (ref 26.8–34.3)
MCHC RBC AUTO-ENTMCNC: 32.6 G/DL (ref 31.4–37.4)
MCV RBC AUTO: 96 FL (ref 82–98)
MICROALBUMIN UR-MCNC: <5 MG/L (ref 0–20)
MICROALBUMIN/CREAT 24H UR: <21 MG/G CREATININE (ref 0–30)
MONOCYTES # BLD AUTO: 1.47 THOUSAND/ΜL (ref 0.17–1.22)
MONOCYTES NFR BLD AUTO: 14 % (ref 4–12)
NEUTROPHILS # BLD AUTO: 6.51 THOUSANDS/ΜL (ref 1.85–7.62)
NEUTS SEG NFR BLD AUTO: 61 % (ref 43–75)
PLATELET # BLD AUTO: 283 THOUSANDS/UL (ref 149–390)
PMV BLD AUTO: 10.4 FL (ref 8.9–12.7)
POTASSIUM SERPL-SCNC: 4 MMOL/L (ref 3.5–5.3)
PROCALCITONIN SERPL-MCNC: 0.12 NG/ML
PROT SERPL-MCNC: 7.1 G/DL (ref 6.4–8.2)
RBC # BLD AUTO: 2.36 MILLION/UL (ref 3.88–5.62)
SODIUM 24H UR-SCNC: 81 MOL/L
SODIUM SERPL-SCNC: 129 MMOL/L (ref 136–145)
WBC # BLD AUTO: 10.62 THOUSAND/UL (ref 4.31–10.16)

## 2018-06-04 PROCEDURE — 83735 ASSAY OF MAGNESIUM: CPT | Performed by: FAMILY MEDICINE

## 2018-06-04 PROCEDURE — 82043 UR ALBUMIN QUANTITATIVE: CPT | Performed by: INTERNAL MEDICINE

## 2018-06-04 PROCEDURE — 80053 COMPREHEN METABOLIC PANEL: CPT | Performed by: FAMILY MEDICINE

## 2018-06-04 PROCEDURE — 97116 GAIT TRAINING THERAPY: CPT

## 2018-06-04 PROCEDURE — 85025 COMPLETE CBC W/AUTO DIFF WBC: CPT | Performed by: FAMILY MEDICINE

## 2018-06-04 PROCEDURE — 82570 ASSAY OF URINE CREATININE: CPT | Performed by: INTERNAL MEDICINE

## 2018-06-04 PROCEDURE — 99024 POSTOP FOLLOW-UP VISIT: CPT | Performed by: SURGERY

## 2018-06-04 PROCEDURE — 99232 SBSQ HOSP IP/OBS MODERATE 35: CPT | Performed by: FAMILY MEDICINE

## 2018-06-04 PROCEDURE — 84300 ASSAY OF URINE SODIUM: CPT | Performed by: INTERNAL MEDICINE

## 2018-06-04 PROCEDURE — 84145 PROCALCITONIN (PCT): CPT | Performed by: FAMILY MEDICINE

## 2018-06-04 RX ORDER — MAGNESIUM SULFATE HEPTAHYDRATE 40 MG/ML
4 INJECTION, SOLUTION INTRAVENOUS ONCE
Status: DISCONTINUED | OUTPATIENT
Start: 2018-06-04 | End: 2018-06-04 | Stop reason: RX

## 2018-06-04 RX ORDER — SIMETHICONE 80 MG
80 TABLET,CHEWABLE ORAL EVERY 6 HOURS PRN
Status: DISCONTINUED | OUTPATIENT
Start: 2018-06-04 | End: 2018-06-07 | Stop reason: HOSPADM

## 2018-06-04 RX ORDER — MAGNESIUM SULFATE HEPTAHYDRATE 40 MG/ML
2 INJECTION, SOLUTION INTRAVENOUS
Status: COMPLETED | OUTPATIENT
Start: 2018-06-04 | End: 2018-06-04

## 2018-06-04 RX ADMIN — LORAZEPAM 0.5 MG: 0.5 TABLET ORAL at 20:20

## 2018-06-04 RX ADMIN — LEVETIRACETAM 1500 MG: 500 TABLET ORAL at 20:20

## 2018-06-04 RX ADMIN — VITAM B12 100 MCG: 100 TAB at 08:35

## 2018-06-04 RX ADMIN — VANCOMYCIN HYDROCHLORIDE 1500 MG: 1 INJECTION, POWDER, LYOPHILIZED, FOR SOLUTION INTRAVENOUS at 08:35

## 2018-06-04 RX ADMIN — GABAPENTIN 300 MG: 300 CAPSULE ORAL at 08:34

## 2018-06-04 RX ADMIN — LEVOFLOXACIN 750 MG: 5 INJECTION, SOLUTION INTRAVENOUS at 23:40

## 2018-06-04 RX ADMIN — DIBASIC SODIUM PHOSPHATE, MONOBASIC POTASSIUM PHOSPHATE AND MONOBASIC SODIUM PHOSPHATE 1 TABLET: 852; 155; 130 TABLET ORAL at 13:20

## 2018-06-04 RX ADMIN — DIBASIC SODIUM PHOSPHATE, MONOBASIC POTASSIUM PHOSPHATE AND MONOBASIC SODIUM PHOSPHATE 1 TABLET: 852; 155; 130 TABLET ORAL at 08:34

## 2018-06-04 RX ADMIN — DIBASIC SODIUM PHOSPHATE, MONOBASIC POTASSIUM PHOSPHATE AND MONOBASIC SODIUM PHOSPHATE 1 TABLET: 852; 155; 130 TABLET ORAL at 17:38

## 2018-06-04 RX ADMIN — THIAMINE HCL TAB 100 MG 100 MG: 100 TAB at 08:35

## 2018-06-04 RX ADMIN — LORAZEPAM 0.5 MG: 0.5 TABLET ORAL at 13:20

## 2018-06-04 RX ADMIN — DIBASIC SODIUM PHOSPHATE, MONOBASIC POTASSIUM PHOSPHATE AND MONOBASIC SODIUM PHOSPHATE 1 TABLET: 852; 155; 130 TABLET ORAL at 21:43

## 2018-06-04 RX ADMIN — MAGNESIUM SULFATE HEPTAHYDRATE 2 G: 40 INJECTION, SOLUTION INTRAVENOUS at 09:12

## 2018-06-04 RX ADMIN — NICOTINE 1 PATCH: 7 PATCH, EXTENDED RELEASE TRANSDERMAL at 08:42

## 2018-06-04 RX ADMIN — Medication 800 MG: at 08:35

## 2018-06-04 RX ADMIN — ENOXAPARIN SODIUM 40 MG: 40 INJECTION, SOLUTION INTRAVENOUS; SUBCUTANEOUS at 21:43

## 2018-06-04 RX ADMIN — MULTIPLE VITAMINS W/ MINERALS TAB 1 TABLET: TAB at 08:35

## 2018-06-04 RX ADMIN — LISINOPRIL 10 MG: 10 TABLET ORAL at 08:40

## 2018-06-04 RX ADMIN — Medication 800 MG: at 17:38

## 2018-06-04 RX ADMIN — MAGNESIUM SULFATE HEPTAHYDRATE 2 G: 40 INJECTION, SOLUTION INTRAVENOUS at 13:21

## 2018-06-04 RX ADMIN — GABAPENTIN 300 MG: 300 CAPSULE ORAL at 17:39

## 2018-06-04 RX ADMIN — VANCOMYCIN 125 MG: KIT at 21:43

## 2018-06-04 RX ADMIN — LORAZEPAM 0.5 MG: 0.5 TABLET ORAL at 08:34

## 2018-06-04 RX ADMIN — FOLIC ACID 1 MG: 1 TABLET ORAL at 08:34

## 2018-06-04 RX ADMIN — VITAMIN D, TAB 1000IU (100/BT) 1000 UNITS: 25 TAB at 08:34

## 2018-06-04 RX ADMIN — VANCOMYCIN HYDROCHLORIDE 1500 MG: 1 INJECTION, POWDER, LYOPHILIZED, FOR SOLUTION INTRAVENOUS at 20:17

## 2018-06-04 RX ADMIN — GABAPENTIN 300 MG: 300 CAPSULE ORAL at 20:20

## 2018-06-04 RX ADMIN — LEVETIRACETAM 1500 MG: 500 TABLET ORAL at 08:35

## 2018-06-04 RX ADMIN — VANCOMYCIN 125 MG: KIT at 08:35

## 2018-06-04 NOTE — ASSESSMENT & PLAN NOTE
Secondary to alcohol abuse and degree of malnutrition  Mg dropped to 1 1 today  Nephrology input appreciated  Continue with replacement  Monitor Mg daily

## 2018-06-04 NOTE — PHYSICAL THERAPY NOTE
PT treatment Note      06/04/18 1410   Subjective   Subjective Reports his leg feels better and can walk farther today  Bed Mobility   Supine to Sit 5  Supervision   Additional items Bedrails   Sit to Supine 5  Supervision   Additional items Bedrails   Transfers   Sit to Stand 5  Supervision   Additional items Bedrails   Stand to Sit 5  Supervision   Stand pivot 5  Supervision   Ambulation/Elevation   Gait pattern Short stride   Gait Assistance 5  Supervision   Assistive Device Rolling walker   Distance 450'   Balance   Static Sitting Good   Dynamic Sitting Fair +   Static Standing Fair   Dynamic Standing Fair   Ambulatory Fair  (with RW)   Endurance Deficit   Endurance Deficit Yes   Activity Tolerance   Activity Tolerance Patient tolerated treatment well   Assessment   Prognosis Good   Problem List Decreased strength;Decreased endurance; Impaired balance;Decreased mobility   Assessment Functional mobility with RW and supervison  Increased distanc ewith good tolerance  Fair balance and stability with RW  Pt is in need of continued activity in PT to improve impairments and functional deficits  Plan   Treatment/Interventions Functional transfer training;LE strengthening/ROM; Therapeutic exercise; Endurance training;Bed mobility; Compensatory technique education   Progress Progressing toward goals   Recommendation   Recommendation Short-term skilled PT   Pt  In bed with call bell within reach at end of PT session

## 2018-06-04 NOTE — ASSESSMENT & PLAN NOTE
Sodium 129 today, patient remains asymptomatic  Acute on chronic, secondary to robi [place]  Nephrology input appreciated  Fluid restriction increased to 1200 cc per 24 hours  Continue to monitor BMP daily

## 2018-06-04 NOTE — PROGRESS NOTES
Progress Note - Nephrology   Ronnie Baker 47 y o  male MRN: [id number]  Unit/Bed#: 102-65 Encounter: [id number]    A/P:  1  Hyponatremia:   6/1: He says he is limiting fluids, however, sodium dropped  Will check urine lytes  May be related to profound hypomagnesemia    6/4: Will continue the patient on fluid restriction, but tighten the restriction to 1 2L from 1 5L  2  Hypomagnesemia: due to alcoholism: Will replace with magnesium sulfate  He is receiving oral magnesium as well  6/1: magnesium dropping in spite of supplementation  Await urine magnesium as he is probably wasting magnesium renally  6/4: Will give another 4 gr IV today, continue with BID PO supplementation  3  Hypoalbuminemia:    Encourage PO intake of proteins  Patient is eating and drinking well  I will check a urine protein/Cr to ensure there is not a significant proteinuria that we may be missing due to dilute urine on the UA  4  Cellulitis: on IV vancomycin and levofloxacin with improvement  5  Transaminitis:   6: Essential hypertension: stable  7  Alcohol dependence:   8   Anemia: normochromic, being followed      Follow up reason for today's visit: Hyponatremia/hypomagnesemia    Cellulitis of right lower extremity    Patient Active Problem List   Diagnosis    Alcohol dependence ([facility] [address] )    Tobacco use    Essential hypertension    COPD (chronic obstructive pulmonary disease) ([address] )    H/O suicide attempt    H/O cervical spine surgery    Cholelithiasis    Hyponatremia    Dietary folate deficiency anemia    Hepatomegaly    Hepatic steatosis    Hypomagnesemia    Elevated alkaline phosphatase level    Alcoholic hepatitis without ascites    Vitamin D deficiency    Iron deficiency    Generalized weakness    Body mass index (BMI) 21 0-21 9, adult    Malnutrition of moderate degree (HCC)    Seizure (HCC)    Continuous chronic alcoholism (HCC)    Incisional hernia    Hx of seizure disorder    Seizure-like activity ([facility] [address] )  Diarrhea    Cellulitis of right lower extremity    Abscess of right leg         Subjective:   Denies headache, dizziness, chest pain, dyspnea, abdominal pain, dysuria  Says heis breathing well     Objective:     Vitals: Blood pressure 117/79, pulse 99, temperature 98 5 °F (36 9 °C), temperature source Temporal, resp  rate 18, height 5' 5" (1 651 m), weight 63 kg (138 lb 14 2 oz), SpO2 98 %  ,Body mass index is 23 11 kg/m²  Weight (last 2 days)     Date/Time   Weight    06/04/18 0537  63 (138 89)    06/03/18 0559  62 5 (137 79)    06/02/18 0600  67 7 (149 25)                Intake/Output Summary (Last 24 hours) at 06/04/18 0852  Last data filed at 06/04/18 0847   Gross per 24 hour   Intake              900 ml   Output             3410 ml   Net            -2510 ml     I/O last 3 completed shifts: In: 7805 [P O :660; IV Piggyback:1150]  Out: 5625 [Urine:5625]         Physical Exam: /79   Pulse 99   Temp 98 5 °F (36 9 °C) (Temporal)   Resp 18   Ht 5' 5" (1 651 m)   Wt 63 kg (138 lb 14 2 oz)   SpO2 98%   BMI 23 11 kg/m²     General Appearance:    Alert, cooperative, no distress, appears stated age   Head:    Normocephalic, without obvious abnormality, atraumatic   Eyes:    Scleral icterus   Ears:    Normal external ears   Nose:   Nares normal, septum midline, mucosa normal, no drainage    or sinus tenderness   Throat:   Lips, mucosa, and tongue normal; teeth and gums normal   Neck:   Supple   Back:     Symmetric, no curvature, ROM normal, no CVA tenderness   Lungs:     Decreased to auscultation bilaterally, respirations unlabored   Chest wall:    No tenderness or deformity   Heart:    Regular rate and rhythm, S1 and S2 normal, no murmur, rub   or gallop   Abdomen:     Soft, non-tender, bowel sounds active   Extremities:   Extremities no edema  Has cellulitis of RLE with mild improvement    No  edema   Skin:   Skin color as above - I no  lesions   Lymph nodes:   Cervical normal   Neurologic: CNII-XII intact            Lab, Imaging and other studies: I have personally reviewed pertinent labs  CBC:   Lab Results   Component Value Date    WBC 10 62 (H) 06/04/2018    HGB 7 4 (L) 06/04/2018    HCT 22 7 (L) 06/04/2018    MCV 96 06/04/2018     06/04/2018    MCH 31 4 06/04/2018    MCHC 32 6 06/04/2018    RDW 20 2 (H) 06/04/2018    MPV 10 4 06/04/2018     CMP:   Lab Results   Component Value Date     (L) 06/04/2018    K 4 0 06/04/2018    CL 94 (L) 06/04/2018    CO2 26 06/04/2018    ANIONGAP 9 06/04/2018    BUN 7 06/04/2018    CREATININE 0 60 06/04/2018    GLUCOSE 85 06/04/2018    CALCIUM 8 1 (L) 06/04/2018     (H) 06/04/2018    ALT 40 06/04/2018    ALKPHOS 201 (H) 06/04/2018    PROT 7 1 06/04/2018    BILITOT 1 50 (H) 06/04/2018    EGFR 116 06/04/2018         Results from last 7 days  Lab Units 06/04/18  0534 06/03/18  0500 06/02/18  0603   SODIUM mmol/L 129* 128* 127*   POTASSIUM mmol/L 4 0 3 9 4 2   CHLORIDE mmol/L 94* 94* 93*   CO2 mmol/L 26 27 26   BUN mg/dL 7 7 7   CREATININE mg/dL 0 60 0 63 0 54*   CALCIUM mg/dL 8 1* 8 3 8 0*   TOTAL PROTEIN g/dL 7 1 7 1 7 1   BILIRUBIN TOTAL mg/dL 1 50* 1 60* 1 80*   ALK PHOS U/L 201* 211* 217*   ALT U/L 40 43 41   AST U/L 102* 121* 105*   GLUCOSE RANDOM mg/dL 85 87 80         Phosphorus:   No results found for: PHOS  Magnesium:   Lab Results   Component Value Date    MG 1 1 (L) 06/04/2018     Urinalysis: No results found for: COLORU, CLARITYU, SPECGRAV, PHUR, LEUKOCYTESUR, NITRITE, PROTEINUA, GLUCOSEU, KETONESU, BILIRUBINUR, BLOODU  Ionized Calcium: No results found for: CAION  Coagulation: No results found for: PT, INR, APTT  Troponin: No results found for: TROPONINI  ABG: No results found for: PHART, JGB9QIE, PO2ART, ZSR7MRJ, U5QQEALT, BEART, SOURCE  Radiology review:     IMAGING  No results found      Current Facility-Administered Medications   Medication Dose Route Frequency    albuterol inhalation solution 2 5 mg  2 5 mg Nebulization Q6H PRN  cholecalciferol (VITAMIN D3) tablet 1,000 Units  1,000 Units Oral Daily    cyanocobalamin (VITAMIN B-12) tablet 100 mcg  100 mcg Oral Daily    diphenhydrAMINE (BENADRYL) injection 25 mg  25 mg Intravenous Q6H PRN    enoxaparin (LOVENOX) subcutaneous injection 40 mg  40 mg Subcutaneous P63Y    folic acid (FOLVITE) tablet 1 mg  1 mg Oral Daily    gabapentin (NEURONTIN) capsule 300 mg  300 mg Oral TID    levETIRAcetam (KEPPRA) tablet 1,500 mg  1,500 mg Oral Q12H [facility]    levofloxacin (LEVAQUIN) IVPB (premix) 750 mg  750 mg Intravenous Q24H    lisinopril (ZESTRIL) tablet 10 mg  10 mg Oral Daily    LORazepam (ATIVAN) 2 mg/mL injection 1 mg  1 mg Intravenous Q4H PRN    LORazepam (ATIVAN) tablet 0 5 mg  0 5 mg Oral TID    magnesium oxide (MAG-OX) tablet 800 mg  800 mg Oral BID    magnesium sulfate 2 g/50 mL IVPB (premix) 2 g  2 g Intravenous Q2H    multivitamin-minerals (CENTRUM) tablet 1 tablet  1 tablet Oral Daily    nicotine (NICODERM CQ) 7 mg/24hr TD 24 hr patch 1 patch  1 patch Transdermal Daily    ondansetron (ZOFRAN) injection 4 mg  4 mg Intravenous Q6H PRN    oxyCODONE (ROXICODONE) IR tablet 5 mg  5 mg Oral BID PRN    potassium-sodium phosphateS (K-PHOS,PHOSPHA 250) -250 mg tablet 1 tablet  1 tablet Oral 4x Daily (with meals and at bedtime)    thiamine (VITAMIN B1) tablet 100 mg  100 mg Oral Daily    vancomycin (VANCOCIN) 1,500 mg in sodium chloride 0 9 % 500 mL IVPB  1,500 mg Intravenous Q12H    vancomycin (VANCOCIN) oral solution 125 mg  125 mg Oral Q12H [facility]     Medications Discontinued During This Encounter   Medication Reason    Magnesium Oxide 400 MG CAPS     thiamine 100 MG tablet     folic acid (FOLVITE) tablet 1 mg Duplicate order    multivitamin stress formula tablet 1 tablet     LORazepam (ATIVAN) 2 mg/mL injection 0 5 mg     LORazepam (ATIVAN) 2 mg/mL injection 1 mg     levETIRAcetam (KEPPRA) 1,500 mg in sodium chloride 0 9 % 100 mL IVPB     magnesium sulfate 4 g/100 mL IVPB (premix) 4 g Alternate therapy    LORazepam (ATIVAN) 2 mg/mL injection 1 mg     potassium chloride (K-DUR,KLOR-CON) CR tablet 40 mEq     HYDROmorphone (DILAUDID) injection 0 5 mg     oxyCODONE (ROXICODONE) IR tablet 5 mg     magnesium sulfate 4 g/100 mL IVPB (premix) 4 g     magnesium sulfate 2 g/50 mL IVPB (premix) 2 g     sodium chloride 0 9 % infusion     LORazepam (ATIVAN) tablet 1 mg     magnesium oxide (MAG-OX) tablet 400 mg     ceFAZolin (ANCEF) IVPB (premix) 1,000 mg     vancomycin (VANCOCIN) 1,250 mg in sodium chloride 0 9 % 250 mL IVPB     magnesium sulfate 4 g/100 mL IVPB (premix) 4 g Availability       Khalif Barroso DO

## 2018-06-04 NOTE — PLAN OF CARE
Problem: DISCHARGE PLANNING - CARE MANAGEMENT  Goal: Discharge to post-acute care or home with appropriate resources  INTERVENTIONS:  - Conduct assessment to determine patient/family and health care team treatment goals, and need for post-acute services based on payer coverage, community resources, and patient preferences, and barriers to discharge  - Address psychosocial, clinical, and financial barriers to discharge as identified in assessment in conjunction with the patient/family and health care team  - Arrange appropriate level of post-acute services according to patient's   needs and preference and payer coverage in collaboration with the physician and health care team  - Communicate with and update the patient/family, physician, and health care team regarding progress on the discharge plan  - Arrange appropriate transportation to post-acute venues   Outcome: [address] [facility] on dc   -OPTIONS determination pending

## 2018-06-04 NOTE — ASSESSMENT & PLAN NOTE
Malnutrition Findings:         Secondary to alcohol abuse    BMI Findings: Body mass index is 23 11 kg/m²     Consult nutrition

## 2018-06-04 NOTE — SOCIAL WORK
OPTIONS determination still pending  Plans are Sod SNF once medically ready and when OPTIONS letter is received  CM will also work on authorization with [employer] for pt to go to SNF on dc

## 2018-06-04 NOTE — ASSESSMENT & PLAN NOTE
Secondary to alcohol abuse   GI input appreciated he will require outpatient follow-up for EGD and colonoscopy  Hemoglobin stable; had 1 u PRBC transfusion at time of admission  Continue folate replacement  Monitor CBC daily  Discussed with GI, as H&H has been stable and due to marked hyponatremia no urgent procedures are recommended

## 2018-06-04 NOTE — PROGRESS NOTES
Progress Note - General Surgery   Pam Cheng 47 y o  male MRN: [id number]  Unit/Bed#: 727-36 Encounter: [id number]    Assessment:  Postop day 2  Status post incision and drainage of right lower extremity abscesses  Appears improved  WBC 10 62  Final wound cultures pending  Other medical problems include: Anemia, normochromic, hemoglobin today 7 4  Folate deficiency  Hyponatremia  Hypomagnesemia  Hypoalbuminemia  Transaminitis  Essential hypertension  History of alcohol dependence, possible alcohol withdrawal    Plan:  Continue current IV antibiotics with IV levofloxin and vancomycin  Right lower extremity dressing change performed by this provider today  Elevate right lower extremity while supine or sitting whenever possible  Await wound culture report, patient will need transition to p o  antibiotics after discharge from hospital   Nursing to continue looped local wound care, cleanse with soap and water daily and daily dressing change  Subjective/Objective      Chief Complaint:  Right lower extremity abscess was drained at bedside 2 days ago by Dr Lavelle Edge  Dressing change was last performed yesterday and packing removed at that time  Subjective:   Right lower extremity remains elevated  Nursing has noted that the surrounding erythema is improved this morning  Surgical dressing change was performed yesterday  Packing was removed at that time  The patient is postop day number 2 status post incision and drainage at bedside of a right lower distal anterior extremity abscess with placement of packing  Culture was obtained with preliminary report today showing no growth  Patient remains on IV Levaquin and IV vancomycin  The patient was noted upon presentation to the ED to have worsening right lower extremity erythema with noted fluctuance of the anterior right lower extremity  CT scan showing an approximately 2 cm abscess  Patient has been afebrile    Max high temperature 98 5°  Scheduled Meds:  Current Facility-Administered Medications:  albuterol 2 5 mg Nebulization Q6H PRN Nickola Dearth, DO    cholecalciferol 1,000 Units Oral Daily Nickola Dearth, DO    cyanocobalamin 100 mcg Oral Daily Nickola Dearth, DO    diphenhydrAMINE 25 mg Intravenous Q6H PRN Julia Barth MD    enoxaparin 40 mg Subcutaneous Q24H Nickola Dearth, DO    folic acid 1 mg Oral Daily Nickola Dearth, DO    gabapentin 300 mg Oral TID Nickola Dearth, DO    levETIRAcetam 1,500 mg Oral Q12H [facility] Danny Camargo MD    levofloxacin 750 mg Intravenous Q24H Julia Barth MD Last Rate: 750 mg (06/03/18 2342)   lisinopril 10 mg Oral Daily Nickola Dearth, DO    LORazepam 1 mg Intravenous Q4H PRN Zarina Dearth, DO    LORazepam 0 5 mg Oral TID Danny Camargo MD    magnesium oxide 800 mg Oral BID Danny Camargo MD    multivitamin-minerals 1 tablet Oral Daily Nickola Dearth, DO    nicotine 1 patch Transdermal Daily Td Salinas, DO    ondansetron 4 mg Intravenous Q6H PRN Zarina Dearth, DO    oxyCODONE 5 mg Oral BID PRN Danny Camargo MD    potassium-sodium phosphateS 1 tablet Oral 4x Daily (with meals and at bedtime) Danny Camargo MD    thiamine 100 mg Oral Daily Nickola Dearth, DO    vancomycin 1,500 mg Intravenous Q12H Julia Barth MD Last Rate: 1,500 mg (06/03/18 1817)   vancomycin 125 mg Oral Q12H [facility] Dilma Melendrez MD      Continuous Infusions:   PRN Meds:   albuterol    diphenhydrAMINE    LORazepam    ondansetron    oxyCODONE      Objective:     Blood pressure 137/83, pulse 99, temperature 98 5 °F (36 9 °C), temperature source Temporal, resp  rate 18, height 5' 5" (1 651 m), weight 63 kg (138 lb 14 2 oz), SpO2 98 %  ,Body mass index is 23 11 kg/m²        Intake/Output Summary (Last 24 hours) at 06/04/18 0734  Last data filed at 06/04/18 0500   Gross per 24 hour   Intake             1160 ml   Output             3410 ml   Net            -2250 ml       Invasive Devices     Peripheral Intravenous Line            Peripheral IV 06/02/18 Right Forearm 1 day                Physical Exam:   Right lower distal extremity dressing was removed  There was old slightly scant yellow colored dried drainage noted on the dressing  Cross-styles incision is sealed  No fluid is expressed  Visual comparison by the patient's RN who saw the patient over the weekend states that the erythema is much improved  Surrounding erythema is less and blanches with palpation  Not particularly warm to touch  New dry sterile dressing was applied  Calf muscles are soft and supple  Moves the toes well  Lab, Imaging and other studies:  I have personally reviewed pertinent lab results  , CBC:   Lab Results   Component Value Date    WBC 10 62 (H) 06/04/2018    HGB 7 4 (L) 06/04/2018    HCT 22 7 (L) 06/04/2018    MCV 96 06/04/2018     06/04/2018    MCH 31 4 06/04/2018    MCHC 32 6 06/04/2018    RDW 20 2 (H) 06/04/2018    MPV 10 4 06/04/2018   , CMP:   Lab Results   Component Value Date     (L) 06/04/2018    K 4 0 06/04/2018    CL 94 (L) 06/04/2018    CO2 26 06/04/2018    ANIONGAP 9 06/04/2018    BUN 7 06/04/2018    CREATININE 0 60 06/04/2018    GLUCOSE 85 06/04/2018    CALCIUM 8 1 (L) 06/04/2018     (H) 06/04/2018    ALT 40 06/04/2018    ALKPHOS 201 (H) 06/04/2018    PROT 7 1 06/04/2018    BILITOT 1 50 (H) 06/04/2018    EGFR 116 06/04/2018     Wound culture and Gram stain   Order: [id number]   Status:  Preliminary result   Visible to patient:  No (Not Released)   Next appt:  None   Specimen Information: Leg, Right;  Wound        GRAM STAIN RESULT  Rare Polys      No bacteria seen               Specimen Collected: 06/02/18 17:31   Last Resulted: 06/03/18 16:59                VTE Pharmacologic Prophylaxis: Enoxaparin (Lovenox)  VTE Mechanical Prophylaxis: sequential compression device     Time spent by this provider was 30 min this morning including review of diagnostic imaging, laboratory studies, previous documentation from the consulting general surgeon, personal discussion with the patient's RN this morning, and examination with wound dressing change conducted by this examiner today      Jose Lam PA-C

## 2018-06-04 NOTE — ASSESSMENT & PLAN NOTE
S/p I&D by Surgery 6/2, POD 2  4 superficial abscesses RLE noted on CT RLE with contrast  Mild leukocytosis, afebrile  F/u wound cultures  Continue with Vanco/Levaquin until wound cultures finilize

## 2018-06-04 NOTE — PLAN OF CARE
Problem: Potential for Falls  Goal: Patient will remain free of falls  INTERVENTIONS:  - Assess patient frequently for physical needs  -  Identify cognitive and physical deficits and behaviors that affect risk of falls    -  [place] fall precautions as indicated by assessment   - Educate patient/family on patient safety including physical limitations  - Instruct patient to call for assistance with activity based on assessment  - Modify environment to reduce risk of injury  - Consider OT/PT consult to assist with strengthening/mobility   Outcome: Progressing      Problem: PAIN - ADULT  Goal: Verbalizes/displays adequate comfort level or baseline comfort level  Interventions:  - Encourage patient to monitor pain and request assistance  - Assess pain using appropriate pain scale  - Administer analgesics based on type and severity of pain and evaluate response  - Implement non-pharmacological measures as appropriate and evaluate response  - Consider cultural and social influences on pain and pain management  - Notify physician/advanced practitioner if interventions unsuccessful or patient reports new pain   Outcome: Progressing      Problem: INFECTION - ADULT  Goal: Absence or prevention of progression during hospitalization  INTERVENTIONS:  - Assess and monitor for signs and symptoms of infection  - Monitor lab/diagnostic results    - [place] appropriate cooling/warming therapies per order  - Administer medications as ordered  - Instruct and encourage patient and family to use good hand hygiene technique  - Identify and instruct in appropriate isolation precautions for identified infection/condition   Outcome: Progressing      Problem: SAFETY ADULT  Goal: Maintain or return to baseline ADL function  INTERVENTIONS:  -  Assess patient's ability to carry out ADLs; assess patient's baseline for ADL function and identify physical deficits which impact ability to perform ADLs (bathing, care of mouth/teeth, toileting, grooming, dressing, etc )  - Assess/evaluate cause of self-care deficits   - Assess range of motion  - Assess patient's mobility; develop plan if impaired  - Assess patient's need for assistive devices and provide as appropriate  - Encourage maximum independence but intervene and supervise when necessary  ¯ Involve family in performance of ADLs  ¯ Assess for home care needs following discharge   ¯ Request OT consult to assist with ADL evaluation and planning for discharge  ¯ Provide patient education as appropriate   Outcome: Progressing    Goal: Maintain or return mobility status to optimal level  INTERVENTIONS:  - Assess patient's baseline mobility status (ambulation, transfers, stairs, etc )    - Identify cognitive and physical deficits and behaviors that affect mobility  - Identify mobility aids required to assist with transfers and/or ambulation (gait belt, sit-to-stand, lift, walker, cane, etc )  - [place] fall precautions as indicated by assessment  - Record patient progress and toleration of activity level on Mobility SBAR; progress patient to next Phase/Stage  - Instruct patient to call for assistance with activity based on assessment  - Request Rehabilitation consult to assist with strengthening/weightbearing, etc    Outcome: Progressing      Problem: DISCHARGE PLANNING  Goal: Discharge to home or other facility with appropriate resources  INTERVENTIONS:  - Identify barriers to discharge w/patient and caregiver  - Arrange for needed discharge resources and transportation as appropriate  - Identify discharge learning needs (meds, wound care, etc )  - Arrange for interpretive services to assist at discharge as needed  - Refer to Case Management Department for coordinating discharge planning if the patient needs post-hospital services based on physician/advanced practitioner order or complex needs related to functional status, cognitive ability, or social support system   Outcome: Progressing      Problem: Knowledge Deficit  Goal: Patient/family/caregiver demonstrates understanding of disease process, treatment plan, medications, and discharge instructions  Complete learning assessment and assess knowledge base    Interventions:  - Provide teaching at level of understanding  - Provide teaching via preferred learning methods   Outcome: Progressing      Problem: Prexisting or High Potential for Compromised Skin Integrity  Goal: Skin integrity is maintained or improved  INTERVENTIONS:  - Identify patients at risk for skin breakdown  - Assess and monitor skin integrity  - Assess and monitor nutrition and hydration status  - Monitor labs (i e  albumin)  - Assess for incontinence   - Turn and reposition patient  - Assist with mobility/ambulation  - Relieve pressure over bony prominences  - Avoid friction and shearing  - Provide appropriate hygiene as needed including keeping skin clean and dry  - Evaluate need for skin moisturizer/barrier cream  - Collaborate with interdisciplinary team (i e  Nutrition, Rehabilitation, etc )   - Patient/family teaching   Outcome: Progressing      Problem: DISCHARGE PLANNING - CARE MANAGEMENT  Goal: Discharge to post-acute care or home with appropriate resources  INTERVENTIONS:  - Conduct assessment to determine patient/family and health care team treatment goals, and need for post-acute services based on payer coverage, community resources, and patient preferences, and barriers to discharge  - Address psychosocial, clinical, and financial barriers to discharge as identified in assessment in conjunction with the patient/family and health care team  - Arrange appropriate level of post-acute services according to patient's   needs and preference and payer coverage in collaboration with the physician and health care team  - Communicate with and update the patient/family, physician, and health care team regarding progress on the discharge plan  - Arrange appropriate transportation to post-acute venues   Outcome: Progressing      Problem: Nutrition/Hydration-ADULT  Goal: Nutrient/Hydration intake appropriate for improving, restoring or maintaining nutritional needs  Monitor and assess patient's nutrition/hydration status for malnutrition (ex- brittle hair, bruises, dry skin, pale skin and conjunctiva, muscle wasting, smooth red tongue, and disorientation)  Collaborate with interdisciplinary team and initiate plan and interventions as ordered  Monitor patient's weight and dietary intake as ordered or per policy  Utilize nutrition screening tool and intervene per policy  Determine patient's food preferences and provide high-protein, high-caloric foods as appropriate       INTERVENTIONS:  - Monitor oral intake, urinary output, labs, and treatment plans  - Assess nutrition and hydration status and recommend course of action  - Evaluate amount of meals eaten  - Assist patient with eating if necessary   - Allow adequate time for meals  - Recommend/ encourage appropriate diets, oral nutritional supplements, and vitamin/mineral supplements  - Order, calculate, and assess calorie counts as needed  - Recommend, monitor, and adjust tube feedings and TPN/PPN based on assessed needs  - Assess need for intravenous fluids  - Provide specific nutrition/hydration education as appropriate  - Include patient/family/caregiver in decisions related to nutrition   Outcome: Progressing

## 2018-06-04 NOTE — PROGRESS NOTES
Progress Note - Polo Zapien 1965, 47 y o  male MRN: [id number]    Unit/Bed#: 649-61 Encounter: [id number]    Primary Care Provider: Burton Chin PA-C   Date and time admitted to hospital: 5/22/2018  3:08 PM        Abscess of right leg   Assessment & Plan    S/p I&D by Surgery 6/2, POD 2  4 superficial abscesses RLE noted on CT RLE with contrast  Mild leukocytosis, afebrile  F/u wound cultures  Continue with Vanco/Levaquin until wound cultures finilize          * Cellulitis of right lower extremity   Assessment & Plan    Improving s/p I&D 6/2 for superficial abscesses  Surgery input appreciated  Will continue Vanco/Levaquin, along with PO Vanco for c diff prophylaxis, this was discussed with ID and Podiatry, input appreciated  Wound culture growing Staph that is sensitive to Ancef - patient received 1 week of therapy - but may have been insufficient wound cultures given worsening erythema, wound cultures s/p I&D pending  Venous Dopplers of right lower extremity negative for DVT        Alcoholic hepatitis without ascites   Assessment & Plan    Stable  GI evaluated, patient not candidate for steroids  Continue to monitor LFTs and INR, ammonia level within normal limits  Counseling on total alcohol cessation        Hypomagnesemia   Assessment & Plan    Secondary to alcohol abuse and degree of malnutrition  Mg dropped to 1 1 today  Nephrology input appreciated  Continue with replacement  Monitor Mg daily          Hyponatremia   Assessment & Plan    Sodium 129 today, patient remains asymptomatic  Acute on chronic, secondary to beer potomania  Nephrology input appreciated  Fluid restriction increased to 1200 cc per 24 hours  Continue to monitor BMP daily        Alcohol dependence ([facility] [address] )   Assessment & Plan    Continue vitamin supplementation  Continue to replete electrolytes  Continue P  O  Ativan 2 5 mg p o  t i d   IV Ativan p r n    Psychiatry input appreciated, noted recommendations to start naltrexone 25 mg daily to help prevent return to heavy drinking after discharge  Plan to discharge to SNF, patient undergoing optioning process due to prior history of inpatient psychiatric hospitalizations  Patient does not meet criteria for involuntary commitment and is deemed capacitated to make healthcare decisions by Psychiatry         Seizure-like activity [facility])   Assessment & Plan    No recurrence since admission   CT head reviewed  Prolactin is top-normal  Transitioned to p o  Keppra 1500 mg p o  b i d  Possibly related to alcohol withdrawal  Outpatient Neurology follow-up        Malnutrition of moderate degree ([address] )   Assessment & Plan    Malnutrition Findings:         Secondary to alcohol abuse    BMI Findings: Body mass index is 23 11 kg/m²  Consult nutrition        Dietary folate deficiency anemia   Assessment & Plan    Secondary to alcohol abuse   GI input appreciated he will require outpatient follow-up for EGD and colonoscopy  Hemoglobin stable; had 1 u PRBC transfusion at time of admission  Continue folate replacement  Monitor CBC daily  Discussed with GI, as H&H has been stable and due to marked hyponatremia no urgent procedures are recommended        Tobacco use   Assessment & Plan    -nicotine patch  -smoking cessation counseling          VTE Pharmacologic Prophylaxis:   Pharmacologic: Enoxaparin (Lovenox)  Mechanical VTE Prophylaxis in Place: Yes    Patient Centered Rounds: I have performed bedside rounds with nursing staff today  Discussions with Specialists or Other Care Team Provider: Nephrology    Education and Discussions with Family / Patient: Patient, will update sister    Time Spent for Care: 30 minutes  More than 50% of total time spent on counseling and coordination of care as described above      Current Length of Stay: 13 day(s)    Current Patient Status: Inpatient   Certification Statement: The patient will continue to require additional inpatient hospital stay due to need for IV antibiotics, optioning process    Discharge Plan: TBD    Code Status: Level 1 - Full Code      Subjective:   Patient seen and examined  He is sleeping but easily aroused  He states his RLE pain is significantly improved  His main complaint is fluid restrictions, he has no other complaints  Has been afebrile  No o/n events  Objective:     Vitals:   Temp (24hrs), Av 5 °F (36 9 °C), Min:98 2 °F (36 8 °C), Max:98 8 °F (37 1 °C)    HR:  [] 89  Resp:  [16-18] 16  BP: (117-137)/(78-88) 134/88  SpO2:  [96 %-98 %] 97 %  Body mass index is 23 11 kg/m²  Input and Output Summary (last 24 hours): Intake/Output Summary (Last 24 hours) at 18 1622  Last data filed at 18 1521   Gross per 24 hour   Intake             1760 ml   Output             3410 ml   Net            -1650 ml       Physical Exam:     Physical Exam   Constitutional: He is oriented to person, place, and time  No distress  HENT:   Head: Normocephalic and atraumatic  Eyes: Conjunctivae are normal    Neck: No JVD present  Cardiovascular: Normal rate and regular rhythm  No murmur heard  Pulmonary/Chest: Effort normal  No respiratory distress  He has no wheezes  He has no rales  Abdominal: Soft  He exhibits distension  There is no tenderness  There is no guarding  Musculoskeletal: He exhibits no edema  Neurological: He is alert and oriented to person, place, and time  Skin: Skin is warm  There is erythema (RLE dressing clean and intact)  Psychiatric: He has a normal mood and affect         Additional Data:     Labs:      Results from last 7 days  Lab Units 18  0534   WBC Thousand/uL 10 62*   HEMOGLOBIN g/dL 7 4*   HEMATOCRIT % 22 7*   PLATELETS Thousands/uL 283   NEUTROS PCT % 61   LYMPHS PCT % 20   MONOS PCT % 14*   EOS PCT % 4       Results from last 7 days  Lab Units 18  0534   SODIUM mmol/L 129*   POTASSIUM mmol/L 4 0   CHLORIDE mmol/L 94*   CO2 mmol/L 26   BUN mg/dL 7   CREATININE mg/dL 0 60   CALCIUM mg/dL 8 1*   TOTAL PROTEIN g/dL 7 1   BILIRUBIN TOTAL mg/dL 1 50*   ALK PHOS U/L 201*   ALT U/L 40   AST U/L 102*   GLUCOSE RANDOM mg/dL 85       Results from last 7 days  Lab Units 06/03/18  0500   INR  1 24*                 * I Have Reviewed All Lab Data Listed Above  * Additional Pertinent Lab Tests Reviewed:  [address] Admission Reviewed    Imaging:    Imaging Reports Reviewed Today Include: none today    Recent Cultures (last 7 days):       Results from last 7 days  Lab Units 06/02/18  1731   GRAM STAIN RESULT  Rare Polys  No bacteria seen   WOUND CULTURE  No growth       Last 24 Hours Medication List:     Current Facility-Administered Medications:  albuterol 2 5 mg Nebulization Q6H PRN [employer], DO    cholecalciferol 1,000 Units Oral Daily [employer], DO    cyanocobalamin 100 mcg Oral Daily [employer], DO    diphenhydrAMINE 25 mg Intravenous Q6H PRN Chapo Forde MD    enoxaparin 40 mg Subcutaneous Q24H [employer], DO    folic acid 1 mg Oral Daily [employer], DO    gabapentin 300 mg Oral TID [employer], DO    levETIRAcetam 1,500 mg Oral Q12H [address] Alyse Long MD    levofloxacin 750 mg Intravenous Q24H Chapo Forde MD Last Rate: 750 mg (06/03/18 2342)   lisinopril 10 mg Oral Daily [employer], DO    LORazepam 1 mg Intravenous Q4H PRN [employer], DO    LORazepam 0 5 mg Oral TID Alyse Long MD    magnesium oxide 800 mg Oral BID Alyse Long MD    multivitamin-minerals 1 tablet Oral Daily [employer], DO    nicotine 1 patch Transdermal Daily [employer], DO    ondansetron 4 mg Intravenous Q6H PRN [employer], DO    oxyCODONE 5 mg Oral BID PRN Alyse Long MD    potassium-sodium phosphateS 1 tablet Oral 4x Daily (with meals and at bedtime) Alyse Long MD    simethicone 80 mg Oral Q6H PRN Karen Otero, DO    thiamine 100 mg Oral Daily [employer], DO    vancomycin 1,500 mg Intravenous Q12H Dilma Melendrez MD Last Rate: Stopped (06/04/18 1035)   vancomycin 125 mg Oral Q12H Kasandra Solis MD         Today, Patient Was Seen By: Candis Orr MD    ** Please Note: Dictation voice to text software may have been used in the creation of this document   **

## 2018-06-04 NOTE — ASSESSMENT & PLAN NOTE
Improving s/p I&D 6/2 for superficial abscesses  Surgery input appreciated  Will continue Vanco/Levaquin, along with PO Vanco for c diff prophylaxis, this was discussed with ID and Podiatry, input appreciated  Wound culture growing Staph that is sensitive to Ancef - patient received 1 week of therapy - but may have been insufficient wound cultures given worsening erythema, wound cultures s/p I&D pending  Venous Dopplers of right lower extremity negative for DVT

## 2018-06-05 PROBLEM — L03.119 CELLULITIS AND ABSCESS OF LOWER EXTREMITY: Status: ACTIVE | Noted: 2018-05-22

## 2018-06-05 PROBLEM — L02.419 CELLULITIS AND ABSCESS OF LOWER EXTREMITY: Status: ACTIVE | Noted: 2018-05-22

## 2018-06-05 LAB
ANION GAP SERPL CALCULATED.3IONS-SCNC: 8 MMOL/L (ref 4–13)
BUN SERPL-MCNC: 6 MG/DL (ref 5–25)
CALCIUM SERPL-MCNC: 8.3 MG/DL (ref 8.3–10.1)
CHLORIDE SERPL-SCNC: 93 MMOL/L (ref 100–108)
CO2 SERPL-SCNC: 28 MMOL/L (ref 21–32)
CREAT SERPL-MCNC: 0.59 MG/DL (ref 0.6–1.3)
GFR SERPL CREATININE-BSD FRML MDRD: 117 ML/MIN/1.73SQ M
GLUCOSE SERPL-MCNC: 90 MG/DL (ref 65–140)
MAGNESIUM SERPL-MCNC: 1.3 MG/DL (ref 1.6–2.6)
POTASSIUM SERPL-SCNC: 3.9 MMOL/L (ref 3.5–5.3)
SCAN RESULT: NORMAL
SODIUM SERPL-SCNC: 129 MMOL/L (ref 136–145)

## 2018-06-05 PROCEDURE — 97535 SELF CARE MNGMENT TRAINING: CPT

## 2018-06-05 PROCEDURE — 99024 POSTOP FOLLOW-UP VISIT: CPT

## 2018-06-05 PROCEDURE — 99232 SBSQ HOSP IP/OBS MODERATE 35: CPT | Performed by: FAMILY MEDICINE

## 2018-06-05 PROCEDURE — 97116 GAIT TRAINING THERAPY: CPT

## 2018-06-05 PROCEDURE — 83735 ASSAY OF MAGNESIUM: CPT | Performed by: INTERNAL MEDICINE

## 2018-06-05 PROCEDURE — 80048 BASIC METABOLIC PNL TOTAL CA: CPT | Performed by: INTERNAL MEDICINE

## 2018-06-05 RX ADMIN — MULTIPLE VITAMINS W/ MINERALS TAB 1 TABLET: TAB at 09:06

## 2018-06-05 RX ADMIN — Medication 800 MG: at 09:06

## 2018-06-05 RX ADMIN — LEVETIRACETAM 1500 MG: 500 TABLET ORAL at 09:05

## 2018-06-05 RX ADMIN — GABAPENTIN 300 MG: 300 CAPSULE ORAL at 09:04

## 2018-06-05 RX ADMIN — DIBASIC SODIUM PHOSPHATE, MONOBASIC POTASSIUM PHOSPHATE AND MONOBASIC SODIUM PHOSPHATE 1 TABLET: 852; 155; 130 TABLET ORAL at 21:10

## 2018-06-05 RX ADMIN — VITAM B12 100 MCG: 100 TAB at 09:04

## 2018-06-05 RX ADMIN — FOLIC ACID 1 MG: 1 TABLET ORAL at 09:04

## 2018-06-05 RX ADMIN — LISINOPRIL 10 MG: 10 TABLET ORAL at 09:05

## 2018-06-05 RX ADMIN — LEVETIRACETAM 1500 MG: 500 TABLET ORAL at 21:11

## 2018-06-05 RX ADMIN — LORAZEPAM 0.5 MG: 0.5 TABLET ORAL at 14:30

## 2018-06-05 RX ADMIN — ENOXAPARIN SODIUM 40 MG: 40 INJECTION, SOLUTION INTRAVENOUS; SUBCUTANEOUS at 21:11

## 2018-06-05 RX ADMIN — VANCOMYCIN 125 MG: KIT at 09:15

## 2018-06-05 RX ADMIN — GABAPENTIN 300 MG: 300 CAPSULE ORAL at 21:11

## 2018-06-05 RX ADMIN — DIBASIC SODIUM PHOSPHATE, MONOBASIC POTASSIUM PHOSPHATE AND MONOBASIC SODIUM PHOSPHATE 1 TABLET: 852; 155; 130 TABLET ORAL at 09:06

## 2018-06-05 RX ADMIN — VANCOMYCIN HYDROCHLORIDE 1500 MG: 1 INJECTION, POWDER, LYOPHILIZED, FOR SOLUTION INTRAVENOUS at 06:52

## 2018-06-05 RX ADMIN — VANCOMYCIN 125 MG: KIT at 21:11

## 2018-06-05 RX ADMIN — GABAPENTIN 300 MG: 300 CAPSULE ORAL at 17:32

## 2018-06-05 RX ADMIN — VITAMIN D, TAB 1000IU (100/BT) 1000 UNITS: 25 TAB at 09:03

## 2018-06-05 RX ADMIN — LORAZEPAM 0.5 MG: 0.5 TABLET ORAL at 09:06

## 2018-06-05 RX ADMIN — NICOTINE 1 PATCH: 7 PATCH, EXTENDED RELEASE TRANSDERMAL at 09:07

## 2018-06-05 RX ADMIN — VANCOMYCIN HYDROCHLORIDE 1500 MG: 1 INJECTION, POWDER, LYOPHILIZED, FOR SOLUTION INTRAVENOUS at 19:07

## 2018-06-05 RX ADMIN — DIBASIC SODIUM PHOSPHATE, MONOBASIC POTASSIUM PHOSPHATE AND MONOBASIC SODIUM PHOSPHATE 1 TABLET: 852; 155; 130 TABLET ORAL at 17:32

## 2018-06-05 RX ADMIN — THIAMINE HCL TAB 100 MG 100 MG: 100 TAB at 09:07

## 2018-06-05 RX ADMIN — Medication 800 MG: at 17:32

## 2018-06-05 RX ADMIN — DIBASIC SODIUM PHOSPHATE, MONOBASIC POTASSIUM PHOSPHATE AND MONOBASIC SODIUM PHOSPHATE 1 TABLET: 852; 155; 130 TABLET ORAL at 11:54

## 2018-06-05 RX ADMIN — LORAZEPAM 0.5 MG: 0.5 TABLET ORAL at 19:07

## 2018-06-05 RX ADMIN — DIBASIC SODIUM PHOSPHATE, MONOBASIC POTASSIUM PHOSPHATE AND MONOBASIC SODIUM PHOSPHATE 1 TABLET: 852; 155; 130 TABLET ORAL at 01:00

## 2018-06-05 NOTE — PROGRESS NOTES
Progress Note - General Surgery   Para Chobailee 47 y o  male MRN: [id number]  Unit/Bed#: 058-80 Encounter: [id number]    Assessment:  Postop day 3  Status post incision and drainage of right lower extremity abscess  Appears to show continued improvement      Final wound cultures pending      Other medical problems include: Anemia, normochromic  Folate deficiency  Hyponatremia  Hypomagnesemia  Hypoalbuminemia  Transaminitis  Essential hypertension  History of alcohol dependence, possible alcohol withdrawal     Plan:  Continue current IV antibiotics with IV levofloxin and vancomycin  Dressing change was performed by this provider today  May leave wound open to air starting tomorrow, Wednesday  Elevate right lower extremity while supine or sitting whenever possible  Await wound culture report, patient will need transition to p o  antibiotics after discharge from hospital for a total of 10 days of antibiotic therapy  Repeat CBC in a m  Will continue to follow  Subjective/Objective   Chief Complaint:  Dressing was removed from the right anterior lower shin area  Patient states that the erythema is improved, less swollen  Subjective:  Patient is postop day 3  Status post incision and drainage at bedside of an abscess formation of his right anterior lower shin area  Waiting for final wound culture report  Preliminary report showed no growth to date  Patient has been afebrile  He had some scant yellow drainage noted on the dressing when removed  Patient denies any new complaints  He remains on IV Levaquin and IV vancomycin      Scheduled Meds:  Current Facility-Administered Medications:  albuterol 2 5 mg Nebulization Q6H PRN Td Lanier DO    cholecalciferol 1,000 Units Oral Daily Td Lanier, DO    cyanocobalamin 100 mcg Oral Daily Td Lanier, DO    diphenhydrAMINE 25 mg Intravenous Q6H PRN Lucy Benítez MD    enoxaparin 40 mg Subcutaneous Q24H Td Lanier DO    folic acid 1 mg Oral Daily Kimberly Haring, DO    gabapentin 300 mg Oral TID Kimberly Haring, DO    levETIRAcetam 1,500 mg Oral Q12H [facility] Brenda Peterson MD    levofloxacin 750 mg Intravenous Q24H Kristian Eric MD Last Rate: 750 mg (06/04/18 2340)   lisinopril 10 mg Oral Daily Kimberly Haring, DO    LORazepam 1 mg Intravenous Q4H PRN Kimberly Haring, DO    LORazepam 0 5 mg Oral TID Brenda Peterson MD    magnesium oxide 800 mg Oral BID Brenda Peterson MD    multivitamin-minerals 1 tablet Oral Daily Kimberly Haring, DO    nicotine 1 patch Transdermal Daily Td Salinas, DO    ondansetron 4 mg Intravenous Q6H PRN Kimberly Haring, DO    oxyCODONE 5 mg Oral BID PRN Brenda Peterson MD    potassium-sodium phosphateS 1 tablet Oral 4x Daily (with meals and at bedtime) Brenda Peterson MD    simethicone 80 mg Oral Q6H PRN Khalif Barroso, DO    thiamine 100 mg Oral Daily Kimberly Haring, DO    vancomycin 1,500 mg Intravenous Q12H Kristian Eric MD Last Rate: 1,500 mg (06/05/18 4971)   vancomycin 125 mg Oral Q12H [facility] Dilma Melendrez MD      Continuous Infusions:   PRN Meds:   albuterol    diphenhydrAMINE    LORazepam    ondansetron    oxyCODONE    simethicone    Objective:     Blood pressure 138/79, pulse 85, temperature 98 1 °F (36 7 °C), temperature source Temporal, resp  rate 18, height 5' 5" (1 651 m), weight 61 4 kg (135 lb 4 8 oz), SpO2 98 %  ,Body mass index is 22 52 kg/m²  Intake/Output Summary (Last 24 hours) at 06/05/18 0856  Last data filed at 06/05/18 0810   Gross per 24 hour   Intake             1080 ml   Output             3525 ml   Net            -2445 ml       Invasive Devices     Peripheral Intravenous Line            Peripheral IV 06/02/18 Right Forearm 2 days                Physical Exam:  Right lower anterior shin dressing was removed  Much improved appearance with less erythema  The cross-styles incision of the mid lower shin is clean and dry   Scant amount of yellow drainage noted on the dressing and this was discarded  The wound was cleansed with Betadine and then alcohol  Calf muscles are soft and supple  Wound was redressed today  Lab, Imaging and other studies:I have personally reviewed pertinent lab results  VTE Pharmacologic Prophylaxis: Enoxaparin (Lovenox)  VTE Mechanical Prophylaxis: sequential compression device left lower extremity only, contraindicated because of right anterior shin abscess and cellulitis  Time spent by this provider was 20 minutes including contacting microbiology in the lab and Gustavo regarding status of final wound culture report  Other time spent including examination the patient and review of the patient's medical records today      Aubrey Arreola PA-C

## 2018-06-05 NOTE — OCCUPATIONAL THERAPY NOTE
OT Note     06/05/18 1044   Restrictions/Precautions   Other Precautions Fall Risk   ADL   Where Assessed Edge of bed   Grooming Assistance 6  Modified Independent   Grooming Comments Completes oral care in stance sinkside, washes hair in stance sinkside   UB Bathing Assistance 5  Supervision/Setup   UB Bathing Comments A with back   LB Bathing Assistance (Set up only)   LB Bathing Comments LEs in seated, little/buttocks in stance   [address] (Set up only)   UB Dressing Comments Dons overhead shirt without difficulty in stance   LB Dressing Assistance 5  Supervision/Setup   LB Dressing Comments Doffs/dons LB clothing and non skids without difficulty   Bed Mobility   Supine to Sit 5  Supervision   Additional items Bedrails   Sit to Supine 6  Modified independent   Transfers   Sit to Stand 5  Supervision   Additional items Bedrails   Stand to Sit 5  Supervision   Additional items Bedrails   Functional Mobility   Functional Mobility 6  Modified independent   Additional Comments Completes txfr EOB to sinkside then to toilet in BR   Additional items (Declines use RW, no LOB)   Toilet Transfers   Toilet Transfer From [employer] Transfer Type To and from   Toilet Transfer to Raised toilet seat with rails   Toilet Transfer Technique Ambulating   Toilet Transfers Modified independence   Toilet Transfers Comments Declines use RW   Activity Tolerance   Activity Tolerance Patient tolerated treatment well   Assessment   Assessment Presents supine, encouragement to participate, complies with encouragement  Completes a m  self care as per above, declines use RW thru mobility, no LOB  Returns supine following self care despite encouragement to remain OOB in chair     Recommendation   Recommendation (Will speak with OT and recommend one more OT tx session)

## 2018-06-05 NOTE — ASSESSMENT & PLAN NOTE
Secondary to alcohol abuse and degree of malnutrition  Mg 1 3 today   Nephrology input appreciated  Continue with replacement (oral and IV)   Monitor Mg daily

## 2018-06-05 NOTE — PHYSICAL THERAPY NOTE
PT Treatment note      06/05/18 0927   Pain Assessment   Pain Score No Pain   Restrictions/Precautions   Other Precautions Bed Alarm;Multiple lines   Subjective   Subjective Feeling better  Bed Mobility   Supine to Sit 6  Modified independent   Additional items Bedrails   Sit to Supine 6  Modified independent   Additional items Bedrails   Transfers   Sit to Stand 5  Supervision   Stand to Sit 7  Independent   Stand pivot 5  Supervision   Ambulation/Elevation   Gait pattern Short stride   Gait Assistance 5  Supervision   Additional items Assist x 1   Assistive Device Rolling walker   Distance 450'    Balance   Static Sitting Good   Dynamic Sitting Good   Static Standing Fair +   Dynamic Standing Fair +   Ambulatory Fair +  (with RW)   Endurance Deficit   Endurance Deficit Yes   Activity Tolerance   Activity Tolerance Patient tolerated treatment well   Assessment   Prognosis Good   Problem List Decreased strength;Decreased endurance; Impaired balance;Decreased mobility   Assessment Performing tx/ambulation at (mod I/S) level  Improving strength, balance, endurance and mobility  Plan   Treatment/Interventions Functional transfer training;LE strengthening/ROM; Therapeutic exercise; Endurance training;Bed mobility;Gait training   Progress Progressing toward goals   Recommendation   Recommendation Home PT; Home with family support   Pt  In bed  with call bell within reach  at end of PT session

## 2018-06-05 NOTE — ASSESSMENT & PLAN NOTE
Improving s/p I&D 6/2 for superficial abscesses  Surgery input appreciated  Will continue Vanco/Levaquin, along with PO Vanco for c diff prophylaxis, this was discussed with ID and Podiatry, input appreciated  Follow up repeat cultures

## 2018-06-05 NOTE — PROGRESS NOTES
Progress Note - Mairssa Chin 1965, 47 y o  male MRN: [id number]    Unit/Bed#: 791-12 Encounter: [id number]    Primary Care Provider: Simin Navarrete PA-C   Date and time admitted to hospital: 5/22/2018  3:08 PM        * Cellulitis and abscess of lower extremity   Assessment & Plan    Improving s/p I&D 6/2 for superficial abscesses  Surgery input appreciated  Will continue Vanco/Levaquin, along with PO Vanco for c diff prophylaxis, this was discussed with ID and Podiatry, input appreciated  Follow up repeat cultures         Alcoholic hepatitis without ascites   Assessment & Plan    Stable  GI evaluated, patient not candidate for steroids  Continue to monitor LFTs and INR, ammonia level within normal limits  Counseling on total alcohol cessation        Hypomagnesemia   Assessment & Plan    Secondary to alcohol abuse and degree of malnutrition  Mg 1 3 today   Nephrology input appreciated  Continue with replacement (oral and IV)   Monitor Mg daily          Hyponatremia   Assessment & Plan    Sodium 129 today, patient remains asymptomatic  Acute on chronic, secondary to beer potomania  Nephrology input appreciated  Fluid restriction increased to 1200 cc per 24 hours  Continue to monitor BMP daily        Alcohol dependence ([address] )   Assessment & Plan    Continue vitamin supplementation  Continue to replete electrolytes  Continue P  O  Ativan 2 5 mg p o  t i d   IV Ativan p r n    Psychiatry input appreciated, noted recommendations to start naltrexone 25 mg daily to help prevent return to heavy drinking after discharge  Plan to discharge to SNF, patient undergoing optioning process due to prior history of inpatient psychiatric hospitalizations  Patient does not meet criteria for involuntary commitment and is deemed capacitated to make healthcare decisions by Psychiatry             Progress Note - Marissa Chin 47 y o  male MRN: [id number]    Unit/Bed#: 412-81 Encounter: [id number]        Subjective:   Seen and examined at bedside, states he feels better today, still interested in short-term physical therapy    Objective:     Vitals:   Vitals:    06/05/18 0652   BP: 138/79   Pulse: 85   Resp: 18   Temp: 98 1 °F (36 7 °C)   SpO2: 98%     Body mass index is 22 52 kg/m²      Intake/Output Summary (Last 24 hours) at 06/05/18 1034  Last data filed at 06/05/18 0932   Gross per 24 hour   Intake             1320 ml   Output             3525 ml   Net            -2205 ml       Physical Exam:   /79 (BP Location: Left arm)   Pulse 85   Temp 98 1 °F (36 7 °C) (Temporal)   Resp 18   Ht 5' 5" (1 651 m)   Wt 61 4 kg (135 lb 4 8 oz)   SpO2 98%   BMI 22 52 kg/m²   General appearance: alert and oriented, in no acute distress  Lungs: clear to auscultation bilaterally  Heart: regular rate and rhythm, S1, S2 normal, no murmur, click, rub or gallop  Abdomen: soft, non-tender; bowel sounds normal; no masses,  no organomegaly  Extremities:  Right lower extremity in Ace wrap and packing, no surrounding erythema  Skin:  Area of erythema and he will resolve, patient is status post incision and drainage  Neurologic: Grossly normal     Invasive Devices     Peripheral Intravenous Line            Peripheral IV 06/02/18 Right Forearm 2 days                  Results from last 7 days  Lab Units 06/04/18  0534 06/03/18  0500 06/02/18  0603   WBC Thousand/uL 10 62* 10 45* 10 51*   HEMOGLOBIN g/dL 7 4* 8 1* 8 4*   HEMATOCRIT % 22 7* 24 9* 25 8*   PLATELETS Thousands/uL 283 291 295         Results from last 7 days  Lab Units 06/05/18  0805 06/04/18  0534 06/03/18  0500 06/02/18  0603   SODIUM mmol/L 129* 129* 128* 127*   POTASSIUM mmol/L 3 9 4 0 3 9 4 2   CHLORIDE mmol/L 93* 94* 94* 93*   CO2 mmol/L 28 26 27 26   BUN mg/dL 6 7 7 7   CREATININE mg/dL 0 59* 0 60 0 63 0 54*   CALCIUM mg/dL 8 3 8 1* 8 3 8 0*   TOTAL PROTEIN g/dL  --  7 1 7 1 7 1   BILIRUBIN TOTAL mg/dL  --  1 50* 1 60* 1 80*   ALK PHOS U/L  --  201* 211* 217*   ALT U/L  --  40 43 41 AST U/L  --  102* 121* 105*   GLUCOSE RANDOM mg/dL 90 85 87 80       Medication Administration - last 24 hours from 06/04/2018 1034 to 06/05/2018 1034       Date/Time Order Dose Route Action Action by     06/05/2018 0907 thiamine (VITAMIN B1) tablet 100 mg 100 mg Oral Given Markos Jones RN     06/05/2018 1431 cholecalciferol (VITAMIN D3) tablet 1,000 Units 1,000 Units Oral Given Markos Jones RN     06/05/2018 5932 cyanocobalamin (VITAMIN B-12) tablet 100 mcg 100 mcg Oral Given Markos Jones RN     24/51/9705 2909 folic acid (FOLVITE) tablet 1 mg 1 mg Oral Given Markos Jones RN     06/05/2018 1739 gabapentin (NEURONTIN) capsule 300 mg 300 mg Oral Given Markos Jones RN     06/04/2018 2020 gabapentin (NEURONTIN) capsule 300 mg 300 mg Oral Given Merl Pump, RN     06/04/2018 1739 gabapentin (NEURONTIN) capsule 300 mg 300 mg Oral Given Merl Pump, RN     06/05/2018 3773 lisinopril (ZESTRIL) tablet 10 mg 10 mg Oral Given Markos Jones RN     06/05/2018 3096 nicotine (NICODERM CQ) 7 mg/24hr TD 24 hr patch 1 patch 1 patch Transdermal Medication Applied Markos Jones RN     06/05/2018 0920 nicotine (NICODERM CQ) 7 mg/24hr TD 24 hr patch 1 patch 1 patch Transdermal Patch Removed Markos Jones RN     06/04/2018 2143 enoxaparin (LOVENOX) subcutaneous injection 40 mg 40 mg Subcutaneous Given Merl Pump, RN     06/05/2018 6936 multivitamin-minerals (CENTRUM) tablet 1 tablet 1 tablet Oral Given Markos Jones RN     06/05/2018 4602 potassium-sodium phosphateS (K-PHOS,PHOSPHA 250) -250 mg tablet 1 tablet 1 tablet Oral Given Markos Jones RN     06/04/2018 2143 potassium-sodium phosphateS (K-PHOS,PHOSPHA 250) -250 mg tablet 1 tablet 1 tablet Oral Given Merl Pump, RN     06/04/2018 1738 potassium-sodium phosphateS (K-PHOS,PHOSPHA 250) -250 mg tablet 1 tablet 1 tablet Oral Given Merl Pump, RN     06/04/2018 1320 potassium-sodium phosphateS (K-PHOS,PHOSPHA 250) -129 mg tablet 1 tablet 1 tablet Oral Given Sherice Beattyeliseo, RN     06/05/2018 1875 levETIRAcetam (KEPPRA) tablet 1,500 mg 1,500 mg Oral [facility]     06/04/2018 2020 levETIRAcetam (KEPPRA) tablet 1,500 mg 1,500 mg Oral Given Deidre Stare, RN     06/05/2018 6493 LORazepam (ATIVAN) tablet 0 5 mg 0 5 mg Oral [facility]     06/04/2018 2020 LORazepam (ATIVAN) tablet 0 5 mg 0 5 mg Oral Given Deidre Stare, RN     06/04/2018 1320 LORazepam (ATIVAN) tablet 0 5 mg 0 5 mg Oral Given Sherice Beecindym, RN     06/05/2018 2122 magnesium oxide (MAG-OX) tablet 800 mg 800 mg Oral [facility]     06/04/2018 1738 magnesium oxide (MAG-OX) tablet 800 mg 800 mg Oral Given Deidre Gillespie, RN     06/05/2018 0915 vancomycin (VANCOCIN) oral solution 125 mg 125 mg Oral Given [facility]     06/04/2018 2143 vancomycin (VANCOCIN) oral solution 125 mg 125 mg Oral Given Deidre Starchito, RN     06/04/2018 2340 levofloxacin (LEVAQUIN) IVPB (premix) 750 mg 750 mg Intravenous [place]     06/05/2018 6330 vancomycin (VANCOCIN) 1,500 mg in sodium chloride 0 9 % 500 mL IVPB 1,500 mg Intravenous [facility]     06/04/2018 2017 vancomycin (VANCOCIN) 1,500 mg in sodium chloride 0 9 % 500 mL IVPB 1,500 mg Intravenous [facility]     06/04/2018 1035 vancomycin (VANCOCIN) 1,500 mg in sodium chloride 0 9 % 500 mL IVPB 0 mg Intravenous Stopped Sherice Beecindy, RN     06/04/2018 1521 magnesium sulfate 2 g/50 mL IVPB (premix) 2 g 0 g Intravenous Stopped Sherice Beatty, RN     06/04/2018 1321 magnesium sulfate 2 g/50 mL IVPB (premix) 2 g 2 g Intravenous [address]     06/04/2018 1112 magnesium sulfate 2 g/50 mL IVPB (premix) 2 g 0 g Intravenous Stopped Sherice Tavera RN            Lab, Imaging and other studies: I have personally reviewed pertinent reports      VTE Pharmacologic Prophylaxis: Enoxaparin (Lovenox)  VTE Mechanical Prophylaxis: sequential compression device     Eric Yee MD  6/5/2018,10:34 AM

## 2018-06-05 NOTE — PROGRESS NOTES
Progress Note - Nephrology   Rigoberto Cade 47 y o  male MRN: [id number]  Unit/Bed#: 466-00 Encounter: [id number]    A/P:  1  Hyponatremia:   6/1: He says he is limiting fluids, however, sodium dropped  Will check urine lytes  May be related to profound hypomagnesemia    6/4: Will continue the patient on fluid restriction, but tighten the restriction to 1 2L from 1 5L      6/5: Sodium level improving slowly, continue with strict 1 2L fluid restriction, I also placed patient on a low sodium level on 6/4   2  Hypomagnesemia: due to alcoholism/tubule disfunction: Will replace with magnesium sulfate  He is receiving oral magnesium as well  6/1: magnesium dropping in spite of supplementation  Await urine magnesium as he is probably wasting magnesium renally  6/4: Will give another 4 gr IV today, continue with BID PO supplementation  6/5: Follow up magnesium level this AM, continue to replete with oral med, IV if magnesium less than 1 4 mg/dL  3  Hypoalbuminemia:    Encourage PO intake of proteins  Patient is eating and drinking well  I will check a urine protein/Cr to ensure there is not a significant proteinuria that we may be missing due to dilute urine on the UA  4  Cellulitis: on IV vancomycin and levofloxacin with improvement  5  Transaminitis:   6: Essential hypertension: stable  7  Alcohol dependence:   8   Anemia: normochromic, being followed      Follow up reason for today's visit: Hyponatremia/hypomagnesemia    Cellulitis of right lower extremity    Patient Active Problem List   Diagnosis    [address] dependence [address] [address]    Tobacco use    Essential hypertension    COPD (chronic obstructive pulmonary disease) ([facility] [address] )    H/O suicide attempt    H/O cervical spine surgery    Cholelithiasis    Hyponatremia    Dietary folate deficiency anemia    Hepatomegaly    Hepatic steatosis    Hypomagnesemia    Elevated alkaline phosphatase level    Alcoholic hepatitis without ascites    Vitamin D deficiency  Iron deficiency    Generalized weakness    Body mass index (BMI) 21 0-21 9, adult    Malnutrition of moderate degree (HCC)    Seizure (HCC)    Continuous chronic alcoholism (HCC)    Incisional hernia    Hx of seizure disorder    Seizure-like activity (HCC)    Diarrhea    Cellulitis of right lower extremity    Abscess of right leg         Subjective:     No acute events overnight  Objective:     Vitals: Blood pressure 138/79, pulse 85, temperature 98 1 °F (36 7 °C), temperature source Temporal, resp  rate 18, height 5' 5" (1 651 m), weight 61 4 kg (135 lb 4 8 oz), SpO2 98 %  ,Body mass index is 22 52 kg/m²  Weight (last 2 days)     Date/Time   Weight    06/05/18 0600  61 4 (135 3)    06/04/18 0537  63 (138 89)    06/03/18 0559  62 5 (137 79)                Intake/Output Summary (Last 24 hours) at 06/05/18 0657  Last data filed at 06/05/18 0210   Gross per 24 hour   Intake             1320 ml   Output             2925 ml   Net            -1605 ml     I/O last 3 completed shifts:   In: 2980 [P O :1380; IV Piggyback:1600]  Out: 4310 [Urine:4310]         Physical Exam: /79 (BP Location: Left arm)   Pulse 85   Temp 98 1 °F (36 7 °C) (Temporal)   Resp 18   Ht 5' 5" (1 651 m)   Wt 61 4 kg (135 lb 4 8 oz)   SpO2 98%   BMI 22 52 kg/m²     General Appearance:    Alert, cooperative, no distress, appears stated age   Head:    Normocephalic, without obvious abnormality, atraumatic   Eyes:    Scleral icterus   Ears:    Normal external ears   Nose:   Nares normal, septum midline, mucosa normal, no drainage    or sinus tenderness   Throat:   Lips, mucosa, and tongue normal; teeth and gums normal   Neck:   Supple   Back:     Symmetric, no curvature, ROM normal, no CVA tenderness   Lungs:     Decreased to auscultation bilaterally, respirations unlabored   Chest wall:    No tenderness or deformity   Heart:    Regular rate and rhythm, S1 and S2 normal, no murmur, rub   or gallop   Abdomen:     Soft, non-tender, bowel sounds active   Extremities:   Extremities no edema  Has cellulitis of RLE with mild improvement  No  edema   Skin:   Skin color as above - I no  lesions   Lymph nodes:   Cervical normal   Neurologic:   CNII-XII intact            Lab, Imaging and other studies: I have personally reviewed pertinent labs  CBC:   No results found for: WBC, HGB, HCT, MCV, PLT, ADJUSTEDWBC, MCH, MCHC, RDW, MPV, NRBC  CMP:   No results found for: NA, K, CL, CO2, ANIONGAP, BUN, CREATININE, GLUCOSE, CALCIUM, AST, ALT, ALKPHOS, PROT, ALBUMIN, BILITOT, EGFR      Results from last 7 days  Lab Units 06/04/18  0534 06/03/18  0500 06/02/18  0603   SODIUM mmol/L 129* 128* 127*   POTASSIUM mmol/L 4 0 3 9 4 2   CHLORIDE mmol/L 94* 94* 93*   CO2 mmol/L 26 27 26   BUN mg/dL 7 7 7   CREATININE mg/dL 0 60 0 63 0 54*   CALCIUM mg/dL 8 1* 8 3 8 0*   TOTAL PROTEIN g/dL 7 1 7 1 7 1   BILIRUBIN TOTAL mg/dL 1 50* 1 60* 1 80*   ALK PHOS U/L 201* 211* 217*   ALT U/L 40 43 41   AST U/L 102* 121* 105*   GLUCOSE RANDOM mg/dL 85 87 80         Phosphorus:   No results found for: PHOS  Magnesium:   No results found for: MG  Urinalysis: No results found for: COLORU, CLARITYU, SPECGRAV, PHUR, LEUKOCYTESUR, NITRITE, PROTEINUA, GLUCOSEU, KETONESU, BILIRUBINUR, BLOODU  Ionized Calcium: No results found for: CAION  Coagulation: No results found for: PT, INR, APTT  Troponin: No results found for: TROPONINI  ABG: No results found for: PHART, SPF0TIN, PO2ART, JZP3VAM, K0SJUMZR, BEART, SOURCE  Radiology review:     IMAGING  No results found      Current Facility-Administered Medications   Medication Dose Route Frequency    albuterol inhalation solution 2 5 mg  2 5 mg Nebulization Q6H PRN    cholecalciferol (VITAMIN D3) tablet 1,000 Units  1,000 Units Oral Daily    cyanocobalamin (VITAMIN B-12) tablet 100 mcg  100 mcg Oral Daily    diphenhydrAMINE (BENADRYL) injection 25 mg  25 mg Intravenous Q6H PRN    enoxaparin (LOVENOX) subcutaneous injection 40 mg  40 mg Subcutaneous G71K    folic acid (FOLVITE) tablet 1 mg  1 mg Oral Daily    gabapentin (NEURONTIN) capsule 300 mg  300 mg Oral TID    levETIRAcetam (KEPPRA) tablet 1,500 mg  1,500 mg Oral Q12H [address]    levofloxacin (LEVAQUIN) IVPB (premix) 750 mg  750 mg Intravenous Q24H    lisinopril (ZESTRIL) tablet 10 mg  10 mg Oral Daily    LORazepam (ATIVAN) 2 mg/mL injection 1 mg  1 mg Intravenous Q4H PRN    LORazepam (ATIVAN) tablet 0 5 mg  0 5 mg Oral TID    magnesium oxide (MAG-OX) tablet 800 mg  800 mg Oral BID    multivitamin-minerals (CENTRUM) tablet 1 tablet  1 tablet Oral Daily    nicotine (NICODERM CQ) 7 mg/24hr TD 24 hr patch 1 patch  1 patch Transdermal Daily    ondansetron (ZOFRAN) injection 4 mg  4 mg Intravenous Q6H PRN    oxyCODONE (ROXICODONE) IR tablet 5 mg  5 mg Oral BID PRN    potassium-sodium phosphateS (K-PHOS,PHOSPHA 250) -250 mg tablet 1 tablet  1 tablet Oral 4x Daily (with meals and at bedtime)    simethicone (MYLICON) chewable tablet 80 mg  80 mg Oral Q6H PRN    thiamine (VITAMIN B1) tablet 100 mg  100 mg Oral Daily    vancomycin (VANCOCIN) 1,500 mg in sodium chloride 0 9 % 500 mL IVPB  1,500 mg Intravenous Q12H    vancomycin (VANCOCIN) oral solution 125 mg  125 mg Oral Q12H [address]     Medications Discontinued During This Encounter   Medication Reason    Magnesium Oxide 400 MG CAPS     thiamine 100 MG tablet     folic acid (FOLVITE) tablet 1 mg Duplicate order    multivitamin stress formula tablet 1 tablet     LORazepam (ATIVAN) 2 mg/mL injection 0 5 mg     LORazepam (ATIVAN) 2 mg/mL injection 1 mg     levETIRAcetam (KEPPRA) 1,500 mg in sodium chloride 0 9 % 100 mL IVPB     magnesium sulfate 4 g/100 mL IVPB (premix) 4 g Alternate therapy    LORazepam (ATIVAN) 2 mg/mL injection 1 mg     potassium chloride (K-DUR,KLOR-CON) CR tablet 40 mEq     HYDROmorphone (DILAUDID) injection 0 5 mg     oxyCODONE (ROXICODONE) IR tablet 5 mg     magnesium sulfate 4 g/100 mL IVPB (premix) 4 g     magnesium sulfate 2 g/50 mL IVPB (premix) 2 g     sodium chloride 0 9 % infusion     LORazepam (ATIVAN) tablet 1 mg     magnesium oxide (MAG-OX) tablet 400 mg     ceFAZolin (ANCEF) IVPB (premix) 1,000 mg     vancomycin (VANCOCIN) 1,250 mg in sodium chloride 0 9 % 250 mL IVPB     magnesium sulfate 4 g/100 mL IVPB (premix) 4 g Availability       Maddison Fu,

## 2018-06-06 LAB
ANION GAP SERPL CALCULATED.3IONS-SCNC: 8 MMOL/L (ref 4–13)
BASOPHILS # BLD AUTO: 0.1 THOUSANDS/ΜL (ref 0–0.1)
BASOPHILS NFR BLD AUTO: 1 % (ref 0–1)
BUN SERPL-MCNC: 6 MG/DL (ref 5–25)
CALCIUM SERPL-MCNC: 8 MG/DL (ref 8.3–10.1)
CHLORIDE SERPL-SCNC: 96 MMOL/L (ref 100–108)
CO2 SERPL-SCNC: 26 MMOL/L (ref 21–32)
CREAT SERPL-MCNC: 0.56 MG/DL (ref 0.6–1.3)
EOSINOPHIL # BLD AUTO: 0.74 THOUSAND/ΜL (ref 0–0.61)
EOSINOPHIL NFR BLD AUTO: 8 % (ref 0–6)
ERYTHROCYTE [DISTWIDTH] IN BLOOD BY AUTOMATED COUNT: 19.2 % (ref 11.6–15.1)
GFR SERPL CREATININE-BSD FRML MDRD: 120 ML/MIN/1.73SQ M
GLUCOSE SERPL-MCNC: 86 MG/DL (ref 65–140)
HCT VFR BLD AUTO: 25 % (ref 36.5–49.3)
HGB BLD-MCNC: 8 G/DL (ref 12–17)
LYMPHOCYTES # BLD AUTO: 1.89 THOUSANDS/ΜL (ref 0.6–4.47)
LYMPHOCYTES NFR BLD AUTO: 21 % (ref 14–44)
MAGNESIUM SERPL-MCNC: 1.2 MG/DL (ref 1.6–2.6)
MCH RBC QN AUTO: 30.7 PG (ref 26.8–34.3)
MCHC RBC AUTO-ENTMCNC: 32 G/DL (ref 31.4–37.4)
MCV RBC AUTO: 96 FL (ref 82–98)
MONOCYTES # BLD AUTO: 1.13 THOUSAND/ΜL (ref 0.17–1.22)
MONOCYTES NFR BLD AUTO: 13 % (ref 4–12)
NEUTROPHILS # BLD AUTO: 5.14 THOUSANDS/ΜL (ref 1.85–7.62)
NEUTS SEG NFR BLD AUTO: 57 % (ref 43–75)
OSMOLALITY UR: 310 MMOL/KG
PHOSPHATE SERPL-MCNC: 4.3 MG/DL (ref 2.7–4.5)
PLATELET # BLD AUTO: 310 THOUSANDS/UL (ref 149–390)
PMV BLD AUTO: 10.1 FL (ref 8.9–12.7)
POTASSIUM SERPL-SCNC: 3.7 MMOL/L (ref 3.5–5.3)
RBC # BLD AUTO: 2.61 MILLION/UL (ref 3.88–5.62)
SODIUM SERPL-SCNC: 130 MMOL/L (ref 136–145)
WBC # BLD AUTO: 9 THOUSAND/UL (ref 4.31–10.16)

## 2018-06-06 PROCEDURE — 83735 ASSAY OF MAGNESIUM: CPT | Performed by: INTERNAL MEDICINE

## 2018-06-06 PROCEDURE — 97116 GAIT TRAINING THERAPY: CPT

## 2018-06-06 PROCEDURE — 99232 SBSQ HOSP IP/OBS MODERATE 35: CPT | Performed by: FAMILY MEDICINE

## 2018-06-06 PROCEDURE — 97535 SELF CARE MNGMENT TRAINING: CPT

## 2018-06-06 PROCEDURE — 85025 COMPLETE CBC W/AUTO DIFF WBC: CPT

## 2018-06-06 PROCEDURE — 83935 ASSAY OF URINE OSMOLALITY: CPT | Performed by: INTERNAL MEDICINE

## 2018-06-06 PROCEDURE — 80048 BASIC METABOLIC PNL TOTAL CA: CPT | Performed by: INTERNAL MEDICINE

## 2018-06-06 PROCEDURE — 84100 ASSAY OF PHOSPHORUS: CPT | Performed by: INTERNAL MEDICINE

## 2018-06-06 RX ORDER — MAGNESIUM SULFATE HEPTAHYDRATE 40 MG/ML
4 INJECTION, SOLUTION INTRAVENOUS ONCE
Status: DISCONTINUED | OUTPATIENT
Start: 2018-06-06 | End: 2018-06-06 | Stop reason: RX

## 2018-06-06 RX ORDER — LORAZEPAM 0.5 MG/1
0.5 TABLET ORAL 2 TIMES DAILY
Status: DISCONTINUED | OUTPATIENT
Start: 2018-06-06 | End: 2018-06-07 | Stop reason: HOSPADM

## 2018-06-06 RX ORDER — SULFAMETHOXAZOLE AND TRIMETHOPRIM 800; 160 MG/1; MG/1
1 TABLET ORAL EVERY 12 HOURS SCHEDULED
Status: DISCONTINUED | OUTPATIENT
Start: 2018-06-06 | End: 2018-06-07 | Stop reason: HOSPADM

## 2018-06-06 RX ORDER — MAGNESIUM SULFATE HEPTAHYDRATE 40 MG/ML
2 INJECTION, SOLUTION INTRAVENOUS ONCE
Status: COMPLETED | OUTPATIENT
Start: 2018-06-06 | End: 2018-06-06

## 2018-06-06 RX ADMIN — MAGNESIUM SULFATE HEPTAHYDRATE 2 G: 40 INJECTION, SOLUTION INTRAVENOUS at 10:56

## 2018-06-06 RX ADMIN — MULTIPLE VITAMINS W/ MINERALS TAB 1 TABLET: TAB at 08:46

## 2018-06-06 RX ADMIN — VANCOMYCIN HYDROCHLORIDE 1500 MG: 1 INJECTION, POWDER, LYOPHILIZED, FOR SOLUTION INTRAVENOUS at 06:09

## 2018-06-06 RX ADMIN — VANCOMYCIN 125 MG: KIT at 08:51

## 2018-06-06 RX ADMIN — LORAZEPAM 0.5 MG: 0.5 TABLET ORAL at 17:16

## 2018-06-06 RX ADMIN — Medication 800 MG: at 22:05

## 2018-06-06 RX ADMIN — GABAPENTIN 300 MG: 300 CAPSULE ORAL at 08:46

## 2018-06-06 RX ADMIN — VITAMIN D, TAB 1000IU (100/BT) 1000 UNITS: 25 TAB at 08:46

## 2018-06-06 RX ADMIN — DIBASIC SODIUM PHOSPHATE, MONOBASIC POTASSIUM PHOSPHATE AND MONOBASIC SODIUM PHOSPHATE 1 TABLET: 852; 155; 130 TABLET ORAL at 07:38

## 2018-06-06 RX ADMIN — GABAPENTIN 300 MG: 300 CAPSULE ORAL at 22:05

## 2018-06-06 RX ADMIN — LEVOFLOXACIN 750 MG: 5 INJECTION, SOLUTION INTRAVENOUS at 00:30

## 2018-06-06 RX ADMIN — DIBASIC SODIUM PHOSPHATE, MONOBASIC POTASSIUM PHOSPHATE AND MONOBASIC SODIUM PHOSPHATE 1 TABLET: 852; 155; 130 TABLET ORAL at 17:16

## 2018-06-06 RX ADMIN — ENOXAPARIN SODIUM 40 MG: 40 INJECTION, SOLUTION INTRAVENOUS; SUBCUTANEOUS at 22:05

## 2018-06-06 RX ADMIN — VITAM B12 100 MCG: 100 TAB at 08:46

## 2018-06-06 RX ADMIN — FOLIC ACID 1 MG: 1 TABLET ORAL at 08:45

## 2018-06-06 RX ADMIN — Medication 800 MG: at 08:45

## 2018-06-06 RX ADMIN — NICOTINE 1 PATCH: 7 PATCH, EXTENDED RELEASE TRANSDERMAL at 08:49

## 2018-06-06 RX ADMIN — Medication 800 MG: at 17:16

## 2018-06-06 RX ADMIN — SULFAMETHOXAZOLE AND TRIMETHOPRIM 1 TABLET: 800; 160 TABLET ORAL at 22:05

## 2018-06-06 RX ADMIN — GABAPENTIN 300 MG: 300 CAPSULE ORAL at 17:16

## 2018-06-06 RX ADMIN — LORAZEPAM 0.5 MG: 0.5 TABLET ORAL at 08:46

## 2018-06-06 RX ADMIN — THIAMINE HCL TAB 100 MG 100 MG: 100 TAB at 08:46

## 2018-06-06 RX ADMIN — DIBASIC SODIUM PHOSPHATE, MONOBASIC POTASSIUM PHOSPHATE AND MONOBASIC SODIUM PHOSPHATE 1 TABLET: 852; 155; 130 TABLET ORAL at 12:59

## 2018-06-06 RX ADMIN — LISINOPRIL 10 MG: 10 TABLET ORAL at 08:46

## 2018-06-06 RX ADMIN — VANCOMYCIN HYDROCHLORIDE 1500 MG: 1 INJECTION, POWDER, LYOPHILIZED, FOR SOLUTION INTRAVENOUS at 19:09

## 2018-06-06 RX ADMIN — MAGNESIUM SULFATE HEPTAHYDRATE 2 G: 40 INJECTION, SOLUTION INTRAVENOUS at 08:30

## 2018-06-06 RX ADMIN — SULFAMETHOXAZOLE AND TRIMETHOPRIM 1 TABLET: 800; 160 TABLET ORAL at 10:54

## 2018-06-06 RX ADMIN — DIBASIC SODIUM PHOSPHATE, MONOBASIC POTASSIUM PHOSPHATE AND MONOBASIC SODIUM PHOSPHATE 1 TABLET: 852; 155; 130 TABLET ORAL at 22:05

## 2018-06-06 RX ADMIN — LEVETIRACETAM 1500 MG: 500 TABLET ORAL at 08:45

## 2018-06-06 RX ADMIN — VANCOMYCIN 125 MG: KIT at 22:08

## 2018-06-06 NOTE — ASSESSMENT & PLAN NOTE
Secondary to alcohol abuse and degree of malnutrition  Mg 1 2 today   Nephrology input appreciated  Continue with replacement (oral and IV)   Monitor Mg daily

## 2018-06-06 NOTE — OCCUPATIONAL THERAPY NOTE
Occupational Therapy         Patient Name: Ronnie Baker  [id number] Date: 6/6/2018          Pt has met all OT goals set and is performing at (I) level at this time in regards to mobility and ADL performance; will d/c OT orders at this time

## 2018-06-06 NOTE — ASSESSMENT & PLAN NOTE
Sodium 130 today, patient remains asymptomatic  Acute on chronic, secondary to beer potomania  Nephrology input appreciated  Fluid restriction increased to 1200 cc per 24 hours  Patient has been noncompliant with fluid restriction and has been seen drinking water from the sink

## 2018-06-06 NOTE — SOCIAL WORK
Spoke with Ciro Whitt at [facility] [phone] who stated authorization number from before [id number] is still good for pt to go to [facility]  SNF just needs to fax clinical to [facility] in 2 business days

## 2018-06-06 NOTE — ASSESSMENT & PLAN NOTE
Improving s/p I&D 6/2 for superficial abscesses  Surgery input appreciated  Culture result showing coagulase-negative Staph  DC vancomycin, Levaquin, start oral Bactrim  Continue p   O  vancomycin for C diff prophylaxis

## 2018-06-06 NOTE — SOCIAL WORK
Received [employer] letter from [employer] office of MH/substance abuse services in [place] clearing pt to go to SNF on dc  CM will work on authorization with [employer] for pt to go to [facility] on dc

## 2018-06-06 NOTE — PHYSICAL THERAPY NOTE
PT PROGRESS NOTE     06/06/18 0556   Pain Assessment   Pain Assessment No/denies pain   Pain Score No Pain   Restrictions/Precautions   Weight Bearing Precautions Per Order No   Other Precautions Fall Risk   Cognition   Overall Cognitive Status WFL   Arousal/Participation Alert   Attention Within functional limits   Orientation Level Oriented X4   Memory Within functional limits   Following Commands Follows all commands and directions without difficulty   Subjective   Subjective Pt reports fatigue  "I want to do steps and walk, and then I am going to get back in bed "   Bed Mobility   Supine to Sit 7  Independent   Sit to Supine 7  Independent   Transfers   Sit to Stand 7  Independent   Stand to Sit 7  Independent   Ambulation/Elevation   Gait pattern (Flexed posture, decreased step length, reciprocal step patte)   Gait Assistance 5  Supervision   Additional items Assist x 1   Assistive Device Rolling walker   Distance (450')   Stair Management Assistance (CGA)   Stair Management Technique One rail R   Number of Stairs (5)   Balance   Static Sitting Good   Dynamic Sitting Good   Static Standing Fair +  (With RW)   Dynamic Standing Fair +  (With RW)   Ambulatory Fair +  (With RW)   Activity Tolerance   Activity Tolerance Patient tolerated treatment well   Assessment   Prognosis Good   Problem List Decreased strength;Decreased endurance; Impaired balance;Decreased mobility   Assessment Pt agreeable to therapy  Reported that he wanted to try the steps today  Limited to 5 steps due to IV line  Pt completed with CGA  Pt demonstrated good endurance, and gait trained 450' with RW with supervision  Pt returned to bed at end of tx  Pt will benefit from continued PT to progress gait, transfers, steps, strengthening, and safety in order to maximize function and decrease risk for falls  Plan   Treatment/Interventions Functional transfer training;LE strengthening/ROM; Elevations; Therapeutic exercise;Patient/family training;Gait training   Progress Progressing toward goals   Recommendation   Recommendation Short-term skilled PT  (vs home with services)   Equipment Recommended Walker   PT - OK to Discharge No   Pt in supine at end of tx  SCDs donned  Call bell in reach

## 2018-06-06 NOTE — OCCUPATIONAL THERAPY NOTE
OT Note     06/06/18 1153   Restrictions/Precautions   Other Precautions Fall Risk   ADL   Where Assessed Standing at sink   Grooming Assistance 7  Independent   Grooming Comments Including setting up new razor   UB Bathing Assistance 7  Independent   UB Bathing Comments A with back   LB Bathing Assistance 7  Independent   LB Bathing Comments Completes in stance sinkside   UB Dressing Assistance 7  Independent   UB Dressing Comments Doff/don overhead without difficulty   LB Dressing Assistance 7  Independent   LB Dressing Comments Doffs/dons in stance with handhold on sink   Bed Mobility   Supine to Sit 7  Independent   Sit to Supine 7  Independent   Transfers   Sit to Stand 7  Independent   Stand to Sit 7  Independent   Functional Mobility   Functional Mobility 7  Independent   Additional Comments Completes func mobility within room to retrieve items with occasional reach for support, no LOB   Additional items (No A device)   Activity Tolerance   Activity Tolerance Patient tolerated treatment well   Assessment   Assessment Presents supine, agreeable to participate  Refer above for details completion self care  Recommend d/c active OT services

## 2018-06-06 NOTE — PROGRESS NOTES
Progress Note - Nephrology   Stephanie Guerrero 47 y o  male MRN: [id number]  Unit/Bed#: 850-95 Encounter: [id number]    A/P:  1  Hyponatremia:   6/1: He says he is limiting fluids, however, sodium dropped  Will check urine lytes  May be related to profound hypomagnesemia    6/4: Will continue the patient on fluid restriction, but tighten the restriction to 1 2L from 1 5L      6/5: Sodium level improving slowly, continue with strict 1 2L fluid restriction, I also placed patient on a low sodium level on 6/4 6/6: Sodium level stable with restriction at 1 2 L, 130 mmol/L this AM   Will check Uosm this AM as well  2  Hypomagnesemia: due to alcoholism/tubule disfunction:    6/4: Will give another 4 gr IV today, continue with BID PO supplementation  6/5: Follow up magnesium level this AM, continue to replete with oral med, IV if magnesium less than 1 4 mg/dL  6/6: Mg level low this AM, will give 4 gr IV, continue with PO meds as well  Will look to try to get plan in place for outpatient supplementation  I will increase PO supplementation to 800 mg TID  3  Hypoalbuminemia:    Encourage PO intake of proteins  Patient is eating and drinking well  I will check a urine protein/Cr to ensure there is not a significant proteinuria that we may be missing due to dilute urine on the UA  4  Cellulitis: on IV vancomycin and levofloxacin with improvement  5  Transaminitis:   6: Essential hypertension: stable  7  Alcohol dependence:   8   Anemia      Follow up reason for today's visit: Hyponatremia/hypomagnesemia    Cellulitis and abscess of lower extremity    Patient Active Problem List   Diagnosis    [facility] dependence ([facility] [address] )    Tobacco use    Essential hypertension    COPD (chronic obstructive pulmonary disease) ([facility] [address] [facility]    H/O suicide attempt    H/O cervical spine surgery    Cholelithiasis    Hyponatremia    Dietary folate deficiency anemia    Hepatomegaly    Hepatic steatosis    Hypomagnesemia    Elevated alkaline phosphatase level    Alcoholic hepatitis without ascites    Vitamin D deficiency    Iron deficiency    Generalized weakness    Body mass index (BMI) 21 0-21 9, adult    Malnutrition of moderate degree (HCC)    Seizure (HCC)    Continuous chronic alcoholism (HCC)    Incisional hernia    Hx of seizure disorder    Seizure-like activity ([facility] [address] )    Diarrhea    Cellulitis and abscess of lower extremity    Abscess of right leg         Subjective:     No acute events overnight  Objective:     Vitals: Blood pressure 149/82, pulse 91, temperature 98 5 °F (36 9 °C), temperature source Temporal, resp  rate 20, height 5' 5" (1 651 m), weight 61 2 kg (134 lb 14 7 oz), SpO2 94 %  ,Body mass index is 22 45 kg/m²  Weight (last 2 days)     Date/Time   Weight    06/06/18 0600  61 2 (134 92)    06/05/18 0600  61 4 (135 3)    06/04/18 0537  63 (138 89)                Intake/Output Summary (Last 24 hours) at 06/06/18 0658  Last data filed at 06/06/18 0536   Gross per 24 hour   Intake             1792 ml   Output             4425 ml   Net            -2633 ml     I/O last 3 completed shifts:   In: 2277 [P O :1677; IV Piggyback:600]  Out: 0490 [Urine:5325]         Physical Exam: /82 (BP Location: Left arm)   Pulse 91   Temp 98 5 °F (36 9 °C) (Temporal)   Resp 20   Ht 5' 5" (1 651 m)   Wt 61 2 kg (134 lb 14 7 oz)   SpO2 94%   BMI 22 45 kg/m²     General Appearance:    Alert, cooperative, no distress, appears stated age   Head:    Normocephalic, without obvious abnormality, atraumatic   Eyes:    Scleral icterus   Ears:    Normal external ears   Nose:   Nares normal, septum midline, mucosa normal, no drainage    or sinus tenderness   Throat:   Lips, mucosa, and tongue normal; teeth and gums normal   Neck:   Supple   Back:     Symmetric, no curvature, ROM normal, no CVA tenderness   Lungs:     Decreased to auscultation bilaterally, respirations unlabored   Chest wall:    No tenderness or deformity   Heart: Regular rate and rhythm, S1 and S2 normal, no murmur, rub   or gallop   Abdomen:     Soft, non-tender, bowel sounds active   Extremities:   Extremities no edema  Has cellulitis of RLE with mild improvement  No  edema   Skin:   Skin color as above - I no  lesions   Lymph nodes:   Cervical normal   Neurologic:   CNII-XII intact            Lab, Imaging and other studies: I have personally reviewed pertinent labs  CBC:   Lab Results   Component Value Date    WBC 9 00 06/06/2018    HGB 8 0 (L) 06/06/2018    HCT 25 0 (L) 06/06/2018    MCV 96 06/06/2018     06/06/2018    MCH 30 7 06/06/2018    MCHC 32 0 06/06/2018    RDW 19 2 (H) 06/06/2018    MPV 10 1 06/06/2018     CMP:   Lab Results   Component Value Date     (L) 06/06/2018    K 3 7 06/06/2018    CL 96 (L) 06/06/2018    CO2 26 06/06/2018    ANIONGAP 8 06/06/2018    BUN 6 06/06/2018    CREATININE 0 56 (L) 06/06/2018    GLUCOSE 86 06/06/2018    CALCIUM 8 0 (L) 06/06/2018    EGFR 120 06/06/2018             Results from last 7 days  Lab Units 06/06/18  0539 06/05/18  0805 06/04/18  0534 06/03/18  0500 06/02/18  0603   SODIUM mmol/L 130* 129* 129* 128* 127*   POTASSIUM mmol/L 3 7 3 9 4 0 3 9 4 2   CHLORIDE mmol/L 96* 93* 94* 94* 93*   CO2 mmol/L 26 28 26 27 26   BUN mg/dL 6 6 7 7 7   CREATININE mg/dL 0 56* 0 59* 0 60 0 63 0 54*   CALCIUM mg/dL 8 0* 8 3 8 1* 8 3 8 0*   TOTAL PROTEIN g/dL  --   --  7 1 7 1 7 1   BILIRUBIN TOTAL mg/dL  --   --  1 50* 1 60* 1 80*   ALK PHOS U/L  --   --  201* 211* 217*   ALT U/L  --   --  40 43 41   AST U/L  --   --  102* 121* 105*   GLUCOSE RANDOM mg/dL 86 90 85 87 80         Phosphorus:   Lab Results   Component Value Date    PHOS 4 3 06/06/2018     Magnesium:   Lab Results   Component Value Date    MG 1 2 (L) 06/06/2018     Urinalysis: No results found for: COLORU, CLARITYU, SPECGRAV, PHUR, LEUKOCYTESUR, NITRITE, PROTEINUA, GLUCOSEU, KETONESU, BILIRUBINUR, BLOODU  Ionized Calcium: No results found for: CAION  Coagulation: No results found for: PT, INR, APTT  Troponin: No results found for: TROPONINI  ABG: No results found for: PHART, NDP5CPW, PO2ART, ATN9RYS, Z8VSTJLX, BEART, SOURCE  Radiology review:     IMAGING  No results found      Current Facility-Administered Medications   Medication Dose Route Frequency    albuterol inhalation solution 2 5 mg  2 5 mg Nebulization Q6H PRN    cholecalciferol (VITAMIN D3) tablet 1,000 Units  1,000 Units Oral Daily    cyanocobalamin (VITAMIN B-12) tablet 100 mcg  100 mcg Oral Daily    diphenhydrAMINE (BENADRYL) injection 25 mg  25 mg Intravenous Q6H PRN    enoxaparin (LOVENOX) subcutaneous injection 40 mg  40 mg Subcutaneous N88G    folic acid (FOLVITE) tablet 1 mg  1 mg Oral Daily    gabapentin (NEURONTIN) capsule 300 mg  300 mg Oral TID    levETIRAcetam (KEPPRA) tablet 1,500 mg  1,500 mg Oral Q12H [address]    levofloxacin (LEVAQUIN) IVPB (premix) 750 mg  750 mg Intravenous Q24H    lisinopril (ZESTRIL) tablet 10 mg  10 mg Oral Daily    LORazepam (ATIVAN) 2 mg/mL injection 1 mg  1 mg Intravenous Q4H PRN    LORazepam (ATIVAN) tablet 0 5 mg  0 5 mg Oral TID    magnesium oxide (MAG-OX) tablet 800 mg  800 mg Oral BID    magnesium sulfate 4 g/100 mL IVPB (premix) 4 g  4 g Intravenous Once    multivitamin-minerals (CENTRUM) tablet 1 tablet  1 tablet Oral Daily    nicotine (NICODERM CQ) 7 mg/24hr TD 24 hr patch 1 patch  1 patch Transdermal Daily    ondansetron (ZOFRAN) injection 4 mg  4 mg Intravenous Q6H PRN    oxyCODONE (ROXICODONE) IR tablet 5 mg  5 mg Oral BID PRN    potassium-sodium phosphateS (K-PHOS,PHOSPHA 250) -250 mg tablet 1 tablet  1 tablet Oral 4x Daily (with meals and at bedtime)    simethicone (MYLICON) chewable tablet 80 mg  80 mg Oral Q6H PRN    thiamine (VITAMIN B1) tablet 100 mg  100 mg Oral Daily    vancomycin (VANCOCIN) 1,500 mg in sodium chloride 0 9 % 500 mL IVPB  1,500 mg Intravenous Q12H    vancomycin (VANCOCIN) oral solution 125 mg  125 mg Oral Q12H [address] Medications Discontinued During This Encounter   Medication Reason    Magnesium Oxide 400 MG CAPS     thiamine 100 MG tablet     folic acid (FOLVITE) tablet 1 mg Duplicate order    multivitamin stress formula tablet 1 tablet     LORazepam (ATIVAN) 2 mg/mL injection 0 5 mg     LORazepam (ATIVAN) 2 mg/mL injection 1 mg     levETIRAcetam (KEPPRA) 1,500 mg in sodium chloride 0 9 % 100 mL IVPB     magnesium sulfate 4 g/100 mL IVPB (premix) 4 g Alternate therapy    LORazepam (ATIVAN) 2 mg/mL injection 1 mg     potassium chloride (K-DUR,KLOR-CON) CR tablet 40 mEq     HYDROmorphone (DILAUDID) injection 0 5 mg     oxyCODONE (ROXICODONE) IR tablet 5 mg     magnesium sulfate 4 g/100 mL IVPB (premix) 4 g     magnesium sulfate 2 g/50 mL IVPB (premix) 2 g     sodium chloride 0 9 % infusion     LORazepam (ATIVAN) tablet 1 mg     magnesium oxide (MAG-OX) tablet 400 mg     ceFAZolin (ANCEF) IVPB (premix) 1,000 mg     vancomycin (VANCOCIN) 1,250 mg in sodium chloride 0 9 % 250 mL IVPB     magnesium sulfate 4 g/100 mL IVPB (premix) 4 g Availability       Toy Alexander DO

## 2018-06-06 NOTE — PROGRESS NOTES
Progress Note - Ghazal Kim 1965, 47 y o  male MRN: [id number]    Unit/Bed#: 648-59 Encounter: [id number]    Primary Care Provider: Melva Rogers PA-C   Date and time admitted to hospital: 5/22/2018  3:08 PM        * Cellulitis and abscess of lower extremity   Assessment & Plan    Improving s/p I&D 6/2 for superficial abscesses  Surgery input appreciated  Culture result showing coagulase-negative Staph  DC vancomycin, Levaquin, start oral Bactrim  Continue p   O  vancomycin for C diff prophylaxis          Alcoholic hepatitis without ascites   Assessment & Plan    Stable  GI evaluated, patient not candidate for steroids  Continue to monitor LFTs and INR, ammonia level within normal limits  Counseling on total alcohol cessation        Hypomagnesemia   Assessment & Plan    Secondary to alcohol abuse and degree of malnutrition  Mg 1 2 today   Nephrology input appreciated  Continue with replacement (oral and IV)   Monitor Mg daily          Hyponatremia   Assessment & Plan    Sodium 130 today, patient remains asymptomatic  Acute on chronic, secondary to beer potomania  Nephrology input appreciated  Fluid restriction increased to 1200 cc per 24 hours  Patient has been noncompliant with fluid restriction and has been seen drinking water from the sink        Alcohol dependence (HCC)   Assessment & Plan    Decrease the Ativan to b i d  dosing  Patient should be discharged naltrexone 25 mg p o  daily            Progress Note - Ghazal Kim 47 y o  male MRN: [id number]    Unit/Bed#: 101-01 Encounter: [id number]        Subjective:   Seen and examined at bedside, states he feels well today  He is considering going home and not going to short-term rehabilitation    Objective:     Vitals:   Vitals:    06/06/18 0740   BP: 138/84   Pulse: 80   Resp: 18   Temp: 97 8 °F (36 6 °C)   SpO2: 96%     Body mass index is 22 45 kg/m²      Intake/Output Summary (Last 24 hours) at 06/06/18 9954  Last data filed at 06/06/18 9789 Gross per 24 hour   Intake             1912 ml   Output             4100 ml   Net            -2188 ml       Physical Exam:   /84 (BP Location: Left arm)   Pulse 80   Temp 97 8 °F (36 6 °C) (Temporal)   Resp 18   Ht 5' 5" (1 651 m)   Wt 61 2 kg (134 lb 14 7 oz)   SpO2 96%   BMI 22 45 kg/m²   General appearance: alert and oriented, in no acute distress  Lungs: clear to auscultation bilaterally  Heart: regular rate and rhythm, S1, S2 normal, no murmur, click, rub or gallop  Abdomen: soft, non-tender; bowel sounds normal; no masses,  no organomegaly  Extremities: extremities normal, warm and well-perfused; no cyanosis, clubbing, or edema  Pulses: 2+ and symmetric  Skin:   Right lower extremity:  Status post incision and drainage, without erythema or edema, incision site appears clean dry intact without drainage  Neurologic: Grossly normal     Invasive Devices     Peripheral Intravenous Line            Peripheral IV 06/06/18 Right Forearm less than 1 day                  Results from last 7 days  Lab Units 06/06/18  0539 06/04/18  0534 06/03/18  0500   WBC Thousand/uL 9 00 10 62* 10 45*   HEMOGLOBIN g/dL 8 0* 7 4* 8 1*   HEMATOCRIT % 25 0* 22 7* 24 9*   PLATELETS Thousands/uL 310 283 291         Results from last 7 days  Lab Units 06/06/18  0539 06/05/18  0805 06/04/18  0534 06/03/18  0500 06/02/18  0603   SODIUM mmol/L 130* 129* 129* 128* 127*   POTASSIUM mmol/L 3 7 3 9 4 0 3 9 4 2   CHLORIDE mmol/L 96* 93* 94* 94* 93*   CO2 mmol/L 26 28 26 27 26   BUN mg/dL 6 6 7 7 7   CREATININE mg/dL 0 56* 0 59* 0 60 0 63 0 54*   CALCIUM mg/dL 8 0* 8 3 8 1* 8 3 8 0*   TOTAL PROTEIN g/dL  --   --  7 1 7 1 7 1   BILIRUBIN TOTAL mg/dL  --   --  1 50* 1 60* 1 80*   ALK PHOS U/L  --   --  201* 211* 217*   ALT U/L  --   --  40 43 41   AST U/L  --   --  102* 121* 105*   GLUCOSE RANDOM mg/dL 86 90 85 87 80       Medication Administration - last 24 hours from 06/05/2018 0854 to 06/06/2018 5596       Date/Time Order Dose Route Action Action by     06/06/2018 0846 thiamine (VITAMIN B1) tablet 100 mg 100 mg Oral Given Romei Cancer, RN     06/05/2018 0078 thiamine (VITAMIN B1) tablet 100 mg 100 mg Oral Given Noemi Mondragon, RN     06/06/2018 0225 cholecalciferol (VITAMIN D3) tablet 1,000 Units 1,000 Units Oral Given [facility], RN     06/05/2018 2355 cholecalciferol (VITAMIN D3) tablet 1,000 Units 1,000 Units Oral Given Noemi Mondragon, RN     06/06/2018 8290 cyanocobalamin (VITAMIN B-12) tablet 100 mcg 100 mcg Oral Given [facility], RN     06/05/2018 1282 cyanocobalamin (VITAMIN B-12) tablet 100 mcg 100 mcg Oral Given Noemi Mondragon, RN     22/90/8583 3915 folic acid (FOLVITE) tablet 1 mg 1 mg Oral Given [facility], RN     76/01/2418 5280 folic acid (FOLVITE) tablet 1 mg 1 mg Oral Given Noemi Mondragon, RN     06/06/2018 0846 gabapentin (NEURONTIN) capsule 300 mg 300 mg Oral Given [facility], RN     06/05/2018 2111 gabapentin (NEURONTIN) capsule 300 mg 300 mg Oral Given James Avitia, RN     06/05/2018 1732 gabapentin (NEURONTIN) capsule 300 mg 300 mg Oral Given James Avitia, RN     06/05/2018 2055 gabapentin (NEURONTIN) capsule 300 mg 300 mg Oral Given Noemi Mondragon, RN     06/06/2018 0846 lisinopril (ZESTRIL) tablet 10 mg 10 mg Oral Given [facility], RN     06/05/2018 8093 lisinopril (ZESTRIL) tablet 10 mg 10 mg Oral Given Noemi Mondragon, RN     06/06/2018 0849 nicotine (NICODERM CQ) 7 mg/24hr TD 24 hr patch 1 patch 1 patch Transdermal Medication Applied Romie Cancer, RN     06/06/2018 0847 nicotine (NICODERM CQ) 7 mg/24hr TD 24 hr patch 1 patch 1 patch Transdermal Patch Removed Romie Cancer, RN     06/05/2018 9806 nicotine (NICODERM CQ) 7 mg/24hr TD 24 hr patch 1 patch 1 patch Transdermal Medication Applied Noemi Mondragon RN     06/05/2018 0905 nicotine (NICODERM CQ) 7 mg/24hr TD 24 hr patch 1 patch 1 patch Transdermal Patch Removed Noemi Mondragon RN     06/05/2018 2826 enoxaparin (LOVENOX) subcutaneous injection 40 mg 40 mg Subcutaneous Given Miguel Angel Barclay, RN     06/06/2018 9100 multivitamin-minerals (CENTRUM) tablet 1 tablet 1 tablet Oral Given Marzette Areas, RN     06/05/2018 4821 multivitamin-minerals (CENTRUM) tablet 1 tablet 1 tablet Oral Given [facility], RN     06/06/2018 5383 potassium-sodium phosphateS (K-PHOS,PHOSPHA 250) -250 mg tablet 1 tablet 1 tablet Oral Given Marzette Areas, RN     06/05/2018 2110 potassium-sodium phosphateS (K-PHOS,PHOSPHA 250) -250 mg tablet 1 tablet 1 tablet Oral Given Miguel Angel Barclay, RN     06/05/2018 1732 potassium-sodium phosphateS (K-PHOS,PHOSPHA 250) -250 mg tablet 1 tablet 1 tablet Oral Given Miguel Angel Barclay, RN     06/05/2018 1154 potassium-sodium phosphateS (K-PHOS,PHOSPHA 250) -250 mg tablet 1 tablet 1 tablet Oral Given [facility], RN     06/05/2018 5199 potassium-sodium phosphateS (K-PHOS,PHOSPHA 250) -250 mg tablet 1 tablet 1 tablet Oral Given [facility], RN     06/06/2018 0845 levETIRAcetam (KEPPRA) tablet 1,500 mg 1,500 mg Oral Given [facility], RN     06/05/2018 2111 levETIRAcetam (KEPPRA) tablet 1,500 mg 1,500 mg Oral Given Brown Barclay, RN     06/05/2018 9990 levETIRAcetam (KEPPRA) tablet 1,500 mg 1,500 mg Oral Given [facility] Sarah, RN     06/06/2018 0846 LORazepam (ATIVAN) tablet 0 5 mg 0 5 mg Oral Given [facility], RN     06/05/2018 1907 LORazepam (ATIVAN) tablet 0 5 mg 0 5 mg Oral Given Brown Barclay, RN     06/05/2018 1430 LORazepam (ATIVAN) tablet 0 5 mg 0 5 mg Oral Given [facility], RN     06/05/2018 2004 LORazepam (ATIVAN) tablet 0 5 mg 0 5 mg Oral Given [facility], RN     06/05/2018 1732 magnesium oxide (MAG-OX) tablet 800 mg 800 mg Oral Given Miguel Angel Barclay RN     06/05/2018 8009 magnesium oxide (MAG-OX) tablet 800 mg 800 mg Oral Given Matt Smith RN     06/06/2018 0834 vancomycin (VANCOCIN) oral solution 125 mg 125 mg Oral Given Derek Wilkinson RN 06/05/2018 2111 vancomycin (VANCOCIN) oral solution 125 mg 125 mg Oral Given Helmut Dancer, RN     06/05/2018 0915 vancomycin (VANCOCIN) oral solution 125 mg 125 mg Oral Given Kathie Lopez RN     06/06/2018 0030 levofloxacin (LEVAQUIN) IVPB (premix) 750 mg 750 mg Intravenous [address] [facility], [address] [place] [address]     06/06/2018 2930 vancomycin (VANCOCIN) 1,500 mg in sodium chloride 0 9 % 500 mL IVPB 1,500 mg Intravenous 909 [facility], RN     06/05/2018 1907 vancomycin (VANCOCIN) 1,500 mg in sodium chloride 0 9 % 500 mL IVPB 1,500 mg Intravenous [facility], [address] [place] [address]     06/06/2018 0845 magnesium oxide (MAG-OX) tablet 800 mg 800 mg Oral Given Hari Gomez RN     06/06/2018 0830 magnesium sulfate 2 g/50 mL IVPB (premix) 2 g 2 g Intravenous New Bag Hari Gomez RN            Lab, Imaging and other studies: I have personally reviewed pertinent reports      VTE Pharmacologic Prophylaxis: Enoxaparin (Lovenox)  VTE Mechanical Prophylaxis: sequential compression device     Kylah Singletary MD  6/6/2018,8:54 AM

## 2018-06-07 VITALS
HEIGHT: 65 IN | BODY MASS INDEX: 22.29 KG/M2 | OXYGEN SATURATION: 98 % | RESPIRATION RATE: 18 BRPM | SYSTOLIC BLOOD PRESSURE: 126 MMHG | DIASTOLIC BLOOD PRESSURE: 77 MMHG | TEMPERATURE: 97.6 F | HEART RATE: 92 BPM | WEIGHT: 133.8 LBS

## 2018-06-07 LAB
ANION GAP SERPL CALCULATED.3IONS-SCNC: 6 MMOL/L (ref 4–13)
BUN SERPL-MCNC: 8 MG/DL (ref 5–25)
CALCIUM SERPL-MCNC: 8.3 MG/DL (ref 8.3–10.1)
CHLORIDE SERPL-SCNC: 97 MMOL/L (ref 100–108)
CO2 SERPL-SCNC: 25 MMOL/L (ref 21–32)
CREAT SERPL-MCNC: 0.61 MG/DL (ref 0.6–1.3)
GFR SERPL CREATININE-BSD FRML MDRD: 116 ML/MIN/1.73SQ M
GLUCOSE SERPL-MCNC: 76 MG/DL (ref 65–140)
MAGNESIUM SERPL-MCNC: 1.5 MG/DL (ref 1.6–2.6)
POTASSIUM SERPL-SCNC: 4.2 MMOL/L (ref 3.5–5.3)
SODIUM SERPL-SCNC: 128 MMOL/L (ref 136–145)
VANCOMYCIN TROUGH SERPL-MCNC: 16 UG/ML (ref 10–20)

## 2018-06-07 PROCEDURE — 99239 HOSP IP/OBS DSCHRG MGMT >30: CPT | Performed by: FAMILY MEDICINE

## 2018-06-07 PROCEDURE — 99024 POSTOP FOLLOW-UP VISIT: CPT

## 2018-06-07 PROCEDURE — 80048 BASIC METABOLIC PNL TOTAL CA: CPT | Performed by: FAMILY MEDICINE

## 2018-06-07 PROCEDURE — 83735 ASSAY OF MAGNESIUM: CPT | Performed by: FAMILY MEDICINE

## 2018-06-07 PROCEDURE — 80202 ASSAY OF VANCOMYCIN: CPT | Performed by: FAMILY MEDICINE

## 2018-06-07 RX ORDER — NALTREXONE HYDROCHLORIDE 50 MG/1
25 TABLET, FILM COATED ORAL DAILY
Refills: 0
Start: 2018-06-07 | End: 2018-07-17 | Stop reason: SDUPTHER

## 2018-06-07 RX ORDER — MAGNESIUM SULFATE HEPTAHYDRATE 40 MG/ML
2 INJECTION, SOLUTION INTRAVENOUS
Status: COMPLETED | OUTPATIENT
Start: 2018-06-07 | End: 2018-06-07

## 2018-06-07 RX ORDER — SULFAMETHOXAZOLE AND TRIMETHOPRIM 800; 160 MG/1; MG/1
1 TABLET ORAL EVERY 12 HOURS SCHEDULED
Refills: 0
Start: 2018-06-07 | End: 2018-06-12

## 2018-06-07 RX ADMIN — FOLIC ACID 1 MG: 1 TABLET ORAL at 08:17

## 2018-06-07 RX ADMIN — GABAPENTIN 300 MG: 300 CAPSULE ORAL at 08:17

## 2018-06-07 RX ADMIN — DIBASIC SODIUM PHOSPHATE, MONOBASIC POTASSIUM PHOSPHATE AND MONOBASIC SODIUM PHOSPHATE 1 TABLET: 852; 155; 130 TABLET ORAL at 13:15

## 2018-06-07 RX ADMIN — VANCOMYCIN 125 MG: KIT at 08:23

## 2018-06-07 RX ADMIN — LORAZEPAM 0.5 MG: 0.5 TABLET ORAL at 08:17

## 2018-06-07 RX ADMIN — THIAMINE HCL TAB 100 MG 100 MG: 100 TAB at 08:17

## 2018-06-07 RX ADMIN — NICOTINE 1 PATCH: 7 PATCH, EXTENDED RELEASE TRANSDERMAL at 08:18

## 2018-06-07 RX ADMIN — VITAM B12 100 MCG: 100 TAB at 08:17

## 2018-06-07 RX ADMIN — VANCOMYCIN HYDROCHLORIDE 1500 MG: 1 INJECTION, POWDER, LYOPHILIZED, FOR SOLUTION INTRAVENOUS at 06:51

## 2018-06-07 RX ADMIN — SULFAMETHOXAZOLE AND TRIMETHOPRIM 1 TABLET: 800; 160 TABLET ORAL at 08:17

## 2018-06-07 RX ADMIN — DIBASIC SODIUM PHOSPHATE, MONOBASIC POTASSIUM PHOSPHATE AND MONOBASIC SODIUM PHOSPHATE 1 TABLET: 852; 155; 130 TABLET ORAL at 08:17

## 2018-06-07 RX ADMIN — Medication 800 MG: at 08:17

## 2018-06-07 RX ADMIN — VITAMIN D, TAB 1000IU (100/BT) 1000 UNITS: 25 TAB at 08:17

## 2018-06-07 RX ADMIN — LISINOPRIL 10 MG: 10 TABLET ORAL at 08:20

## 2018-06-07 RX ADMIN — MAGNESIUM SULFATE HEPTAHYDRATE 2 G: 40 INJECTION, SOLUTION INTRAVENOUS at 10:08

## 2018-06-07 RX ADMIN — MAGNESIUM SULFATE HEPTAHYDRATE 2 G: 40 INJECTION, SOLUTION INTRAVENOUS at 11:29

## 2018-06-07 RX ADMIN — MULTIPLE VITAMINS W/ MINERALS TAB 1 TABLET: TAB at 08:17

## 2018-06-07 NOTE — PROGRESS NOTES
Progress Note - General Surgery   Marissa Chin 47 y o  male MRN: [id number]  Unit/Bed#: 332-15 Encounter: [id number]    Assessment:  Status post I and D abscess right anterior lower extremity, postop day 5  Surrounding cellulitis improved  Plan:  Leave right anterior lower extremity wound open to air  No further surgical intervention needed at this time  Continue p o  antibiotics, patient has been transition to Bactrim  Subjective/Objective   Chief Complaint:  Right lower anterior shin is much improved  Erythema is significantly less  No reports of any fever  Subjective:  Patient had incision and drainage of right anterior lower extremity shin abscess drained 5 days ago at bedside by Dr Teo Tena  He has been transitioned to p o  antibiotics this time  He remains afebrile  No report of any drainage  Wound has been left open to air and is dry        Scheduled Meds:  Current Facility-Administered Medications:  albuterol 2 5 mg Nebulization Q6H PRN [facility]    cholecalciferol 1,000 Units Oral Daily [facility], DO    cyanocobalamin 100 mcg Oral Daily [facility]    diphenhydrAMINE 25 mg Intravenous Q6H PRN Td Vasquez MD    enoxaparin 40 mg Subcutaneous Q24H [facility], DO    folic acid 1 mg Oral Daily [facility], DO    gabapentin 300 mg Oral TID [facility], DO    lisinopril 10 mg Oral Daily [facility], DO    LORazepam 1 mg Intravenous Q4H PRN [facility], [facility]    LORazepam 0 5 mg Oral BID Zoey Mejía MD    magnesium oxide 800 mg Oral TID Addi Farrar, DO    magnesium sulfate 2 g Intravenous Q2H Viktoria Jeffery MD Last Rate: 0 g (06/07/18 1128)   multivitamin-minerals 1 tablet Oral Daily [facility]    nicotine 1 patch Transdermal Daily Td Salinas, DO    ondansetron 4 mg Intravenous Q6H PRN Junaiddivine Palmer, DO    oxyCODONE 5 mg Oral BID PRN Viktoria Jeffery MD    potassium-sodium phosphateS 1 tablet Oral 4x Daily (with meals and at bedtime) Yumiko Vega MD    simethicone 80 mg Oral Q6H PRN Peter Smith,     sulfamethoxazole-trimethoprim 1 tablet Oral Q12H [address] Yumiko Vega MD    thiamine 100 mg Oral Daily Chandra Siddiqui,     vancomycin 125 mg Oral Q12H [address] Any Thompson MD      Continuous Infusions:   PRN Meds:   albuterol    diphenhydrAMINE    LORazepam    ondansetron    oxyCODONE    simethicone      Objective:     Blood pressure 126/77, pulse 92, temperature 97 6 °F (36 4 °C), temperature source Temporal, resp  rate 18, height 5' 5" (1 651 m), weight 60 7 kg (133 lb 12 8 oz), SpO2 98 %  ,Body mass index is 22 27 kg/m²  Intake/Output Summary (Last 24 hours) at 06/07/18 1252  Last data filed at 06/07/18 1128   Gross per 24 hour   Intake             2250 ml   Output             2425 ml   Net             -175 ml       Invasive Devices     Peripheral Intravenous Line            Peripheral IV 06/06/18 Right Forearm 1 day                Physical Exam:  Patient is awake and alert  No acute distress  Skin warm dry to touch  Right lower extremity shows much improvement  Erythema is significantly less red  No warm to touch  The cross-styles incision is sealed without any drainage and remains dry  No calf tenderness  No peripheral edema  Lab, Imaging and other studies:  I have personally reviewed pertinent lab results    , CBC: No results found for: WBC, HGB, HCT, MCV, PLT, ADJUSTEDWBC, MCH, MCHC, RDW, MPV, NRBC, CMP:   Lab Results   Component Value Date     (L) 06/07/2018    K 4 2 06/07/2018    CL 97 (L) 06/07/2018    CO2 25 06/07/2018    ANIONGAP 6 06/07/2018    BUN 8 06/07/2018    CREATININE 0 61 06/07/2018    GLUCOSE 76 06/07/2018    CALCIUM 8 3 06/07/2018    EGFR 116 06/07/2018     VTE Pharmacologic Prophylaxis: Enoxaparin (Lovenox)  VTE Mechanical Prophylaxis: sequential compression device left lower extremity only    Time spent by this provider today was 15 min including examination of the patient as well as review of existing medical documentation, laboratory studies and personal discussion with the consulting general surgeon, Dr Ellen Mcdonough PA-C

## 2018-06-07 NOTE — PLAN OF CARE
Problem: Potential for Falls  Goal: Patient will remain free of falls  INTERVENTIONS:  - Assess patient frequently for physical needs  -  Identify cognitive and physical deficits and behaviors that affect risk of falls    -  [place] fall precautions as indicated by assessment   - Educate patient/family on patient safety including physical limitations  - Instruct patient to call for assistance with activity based on assessment  - Modify environment to reduce risk of injury  - Consider OT/PT consult to assist with strengthening/mobility   Outcome: Adequate for Discharge      Problem: PAIN - ADULT  Goal: Verbalizes/displays adequate comfort level or baseline comfort level  Interventions:  - Encourage patient to monitor pain and request assistance  - Assess pain using appropriate pain scale  - Administer analgesics based on type and severity of pain and evaluate response  - Implement non-pharmacological measures as appropriate and evaluate response  - Consider cultural and social influences on pain and pain management  - Notify physician/advanced practitioner if interventions unsuccessful or patient reports new pain   Outcome: Adequate for Discharge      Problem: INFECTION - ADULT  Goal: Absence or prevention of progression during hospitalization  INTERVENTIONS:  - Assess and monitor for signs and symptoms of infection  - Monitor lab/diagnostic results    - [place] appropriate cooling/warming therapies per order  - Administer medications as ordered  - Instruct and encourage patient and family to use good hand hygiene technique  - Identify and instruct in appropriate isolation precautions for identified infection/condition   Outcome: Adequate for Discharge      Problem: SAFETY ADULT  Goal: Maintain or return to baseline ADL function  INTERVENTIONS:  -  Assess patient's ability to carry out ADLs; assess patient's baseline for ADL function and identify physical deficits which impact ability to perform ADLs (bathing, care of mouth/teeth, toileting, grooming, dressing, etc )  - Assess/evaluate cause of self-care deficits   - Assess range of motion  - Assess patient's mobility; develop plan if impaired  - Assess patient's need for assistive devices and provide as appropriate  - Encourage maximum independence but intervene and supervise when necessary  ¯ Involve family in performance of ADLs  ¯ Assess for home care needs following discharge   ¯ Request OT consult to assist with ADL evaluation and planning for discharge  ¯ Provide patient education as appropriate   Outcome: Adequate for Discharge    Goal: Maintain or return mobility status to optimal level  INTERVENTIONS:  - Assess patient's baseline mobility status (ambulation, transfers, stairs, etc )    - Identify cognitive and physical deficits and behaviors that affect mobility  - Identify mobility aids required to assist with transfers and/or ambulation (gait belt, sit-to-stand, lift, walker, cane, etc )  - [place] fall precautions as indicated by assessment  - Record patient progress and toleration of activity level on Mobility SBAR; progress patient to next Phase/Stage  - Instruct patient to call for assistance with activity based on assessment  - Request Rehabilitation consult to assist with strengthening/weightbearing, etc    Outcome: Adequate for Discharge      Problem: DISCHARGE PLANNING  Goal: Discharge to home or other facility with appropriate resources  INTERVENTIONS:  - Identify barriers to discharge w/patient and caregiver  - Arrange for needed discharge resources and transportation as appropriate  - Identify discharge learning needs (meds, wound care, etc )  - Arrange for interpretive services to assist at discharge as needed  - Refer to Case Management Department for coordinating discharge planning if the patient needs post-hospital services based on physician/advanced practitioner order or complex needs related to functional status, cognitive ability, or social support system   Outcome: Adequate for Discharge      Problem: Knowledge Deficit  Goal: Patient/family/caregiver demonstrates understanding of disease process, treatment plan, medications, and discharge instructions  Complete learning assessment and assess knowledge base  Interventions:  - Provide teaching at level of understanding  - Provide teaching via preferred learning methods   Outcome: Adequate for Discharge      Problem: Prexisting or High Potential for Compromised Skin Integrity  Goal: Skin integrity is maintained or improved  INTERVENTIONS:  - Identify patients at risk for skin breakdown  - Assess and monitor skin integrity  - Assess and monitor nutrition and hydration status  - Monitor labs (i e  albumin)  - Assess for incontinence   - Turn and reposition patient  - Assist with mobility/ambulation  - Relieve pressure over bony prominences  - Avoid friction and shearing  - Provide appropriate hygiene as needed including keeping skin clean and dry  - Evaluate need for skin moisturizer/barrier cream  - Collaborate with interdisciplinary team (i e  Nutrition, Rehabilitation, etc )   - Patient/family teaching   Outcome: Adequate for Discharge      Problem: Nutrition/Hydration-ADULT  Goal: Nutrient/Hydration intake appropriate for improving, restoring or maintaining nutritional needs  Monitor and assess patient's nutrition/hydration status for malnutrition (ex- brittle hair, bruises, dry skin, pale skin and conjunctiva, muscle wasting, smooth red tongue, and disorientation)  Collaborate with interdisciplinary team and initiate plan and interventions as ordered  Monitor patient's weight and dietary intake as ordered or per policy  Utilize nutrition screening tool and intervene per policy  Determine patient's food preferences and provide high-protein, high-caloric foods as appropriate       INTERVENTIONS:  - Monitor oral intake, urinary output, labs, and treatment plans  - Assess nutrition and hydration status and recommend course of action  - Evaluate amount of meals eaten  - Assist patient with eating if necessary   - Allow adequate time for meals  - Recommend/ encourage appropriate diets, oral nutritional supplements, and vitamin/mineral supplements  - Order, calculate, and assess calorie counts as needed  - Recommend, monitor, and adjust tube feedings and TPN/PPN based on assessed needs  - Assess need for intravenous fluids  - Provide specific nutrition/hydration education as appropriate  - Include patient/family/caregiver in decisions related to nutrition   Outcome: Adequate for Discharge

## 2018-06-07 NOTE — SOCIAL WORK
Pt is being dc'd today to [facility] via [facility]   is 2:30pm  Radha Liu in admissions department at SNF aware

## 2018-06-07 NOTE — DISCHARGE SUMMARY
Discharge Summary - Tessie Daniels 47 y o  male MRN: [id number]    Unit/Bed#: 731-12 Encounter: [id number]    Admission Date:   Admission Orders     Ordered        05/22/18 1905  Inpatient Admission  Once               Admitting Diagnosis: Hypomagnesemia [E83 42]  Hyperbilirubinemia [E80 6]  Hyponatremia [E87 1]  Hypoalbuminemia [E88 09]  Foot swelling [M79 89]  Alcoholism /alcohol abuse ([address] ) [F10 20]  Transaminitis [R74 0]  Pedal edema [R60 0]  Cellulitis of right foot [L59 867]  Cellulitis of right leg [P71 290]    HPI: Tessie Daniels is a 47 y o  male who presents to the emergency department complaints of right lower extremity edema and erythema  The patient also has developed mild erythema of the left lower extremity  The patient continues to abuse alcohol and drink beer on daily basis  The patient states he is trying to cut down on his daily alcohol intake  Over the last week, the patient has experienced multiple seizures, which has caused him to fall to the ground or floor  He has been experiencing multiple episodes diarrhea over last few days  In addition, the patient developed erythema and edema of his right lower extremity from his right knee down to his right foot  He also noticed some mild erythema of his left lower extremity  Nothing seemed to improve his symptoms  His symptoms were constant in nature  Procedures Performed:   Orders Placed This Encounter   Procedures    ED ECG Documentation Only    Incision and Drainage       Summary of Hospital Course:     Right lower extremity cellulitis with abscess:  Patient was admitted to the medical floor, he was started on IV Ancef, MRSA screen was checked and was negative, a venous duplex of bilateral lower extremities obtained and was negative  Patient's erythema worsened and after consultation with Podiatry as CT of the lower extremity was ordered  CT results revealed an abscess in consultation with surgery was obtained    Patient underwent bedside incision and drainage  He will follow up with surgery as an outpatient for wound care along with evaluation of his ventral hernia  Hypomagnesemia:  Despite aggressive repletion of magnesium via IV and p   O  patient's magnesium feels significantly improved  Consultation with Nephrology was obtained  This is likely due to tubular dysfunction in the setting of chronic alcohol use  The patient received IV magnesium sulfate throughout his inpatient stay and will be discharged on 800 mg magnesium oxide 3 times a day  They will check daily magnesium level for 5 days at skilled nursing facility  Hyponatremia:  Likely secondary to hypovolemia along with beer potomania  Patient previously on Lasix and salt tablets, after consultation nephrology the patient's sodium remained stable between 128-130, he will be placed on a fluid restriction  During his fluid restriction as an inpatient the patient was noted to be drinking from the sink  He was counseled about adhering to fluid restriction  Alcoholic hepatitis without ascites / Alcohol dependance:  Patient had significant elevation of his liver function testing on admission  After consultation with Gastroenterology he was not a candidate for steroids  INR and ammonia were within normal limits  He was placed on around the clock Ativan which was tapered during his inpatient stay  Patient did not have any withdrawal symptoms  He will follow up with Gastroenterology as an outpatient  After consultation with Psychiatry the patient was recommended to be discharged on naltrexone 25 mg p o  daily  Seizure-like activity:  Patient had some seizure activity on admission, his Keppra was increased to 1500 mg p  O  b i d , on discharge I will decrease him back to his home dosage of 1000 mg BID as I suspect his seizure activity was due to acute stressors         Significant Findings, Care, Treatment and Services Provided:   CT RLE:   Diffuse subcutaneous edema consistent with cellulitis, with 4 small subcutaneous abscesses as described above  Complications: none    Discharge Diagnosis: see above    Resolved Problems  Date Reviewed: 6/4/2018    None          Condition at Discharge: Good         Discharge instructions/Information to patient and family:   See after visit summary for information provided to patient and family  Provisions for Follow-Up Care:  See after visit summary for information related to follow-up care and any pertinent home health orders  PCP: Yahir Resendez PA-C    Disposition: [facility]    Planned Readmission: No    Discharge Statement   I spent 60 minutes discharging the patient  This time was spent on the day of discharge  I had direct contact with the patient on the day of discharge  Additional documentation is required if more than 30 minutes were spent on discharge  Discharge Medications:  See after visit summary for reconciled discharge medications provided to patient and family

## 2018-06-07 NOTE — PROGRESS NOTES
Progress Note - Nephrology   Rigoberto Cade 47 y o  male MRN: [id number]  Unit/Bed#: 034-58 Encounter: [id number]    A/P:  1  Hyponatremia:   6/1: He says he is limiting fluids, however, sodium dropped  Will check urine lytes  May be related to profound hypomagnesemia    6/4: Will continue the patient on fluid restriction, but tighten the restriction to 1 2L from 1 5L      6/5: Sodium level improving slowly, continue with strict 1 2L fluid restriction, I also placed patient on a low sodium level on 6/4 6/6: Sodium level stable with restriction at 1 2 L, 130 mmol/L this AM   Will check Uosm this AM as well  6/7: If patient is able to stop IVF (from Abx), we can increase the fluid restriction to 1 8L, continue with low sodium diet  2  Hypomagnesemia: due to alcoholism/tubule disfunction:    6/4: Will give another 4 gr IV today, continue with BID PO supplementation  6/5: Follow up magnesium level this AM, continue to replete with oral med, IV if magnesium less than 1 4 mg/dL  6/6: Mg level low this AM, will give 4 gr IV, continue with PO meds as well  Will look to try to get plan in place for outpatient supplementation  I will increase PO supplementation to 800 mg TID    6/7: Mg level this AM is pending, replete with IV as indicated, will see if any marked improvement on increased Mg supplementation from 6/6   3  Hypoalbuminemia:    Encourage PO intake of proteins  Patient is eating and drinking well  I will check a urine protein/Cr to ensure there is not a significant proteinuria that we may be missing due to dilute urine on the UA  4  Cellulitis   Medications per hospitalists  5  Transaminitis:   6: Essential hypertension: stable  7  Alcohol dependence:   8   Anemia      Follow up reason for today's visit: Hyponatremia/hypomagnesemia    Cellulitis and abscess of lower extremity    Patient Active Problem List   Diagnosis    Alcohol dependence ([address] )    Tobacco use    Essential hypertension    COPD (chronic obstructive pulmonary disease) [facility] [address]    H/O suicide attempt    H/O cervical spine surgery    Cholelithiasis    Hyponatremia    Dietary folate deficiency anemia    Hepatomegaly    Hepatic steatosis    Hypomagnesemia    Elevated alkaline phosphatase level    Alcoholic hepatitis without ascites    Vitamin D deficiency    Iron deficiency    Generalized weakness    Body mass index (BMI) 21 0-21 9, adult    Malnutrition of moderate degree (HCC)    Seizure (HCC)    Continuous chronic alcoholism (HCC)    Incisional hernia    Hx of seizure disorder    Seizure-like activity ([facility] )    Diarrhea    Cellulitis and abscess of lower extremity    Abscess of right leg         Subjective:     No acute events overnight  Objective:     Vitals: Blood pressure 116/80, pulse 78, temperature 98 4 °F (36 9 °C), temperature source Temporal, resp  rate 18, height 5' 5" (1 651 m), weight 60 7 kg (133 lb 12 8 oz), SpO2 96 %  ,Body mass index is 22 27 kg/m²  Weight (last 2 days)     Date/Time   Weight    06/07/18 0600  60 7 (133 8)    06/06/18 0600  61 2 (134 92)    06/05/18 0600  61 4 (135 3)                Intake/Output Summary (Last 24 hours) at 06/07/18 0726  Last data filed at 06/06/18 2333   Gross per 24 hour   Intake              960 ml   Output             2375 ml   Net            -1415 ml     I/O last 3 completed shifts:   In: 4799 [P O :1795]  Out: 4400 [Urine:4400]         Physical Exam: /80 (BP Location: Left arm)   Pulse 78   Temp 98 4 °F (36 9 °C) (Temporal)   Resp 18   Ht 5' 5" (1 651 m)   Wt 60 7 kg (133 lb 12 8 oz)   SpO2 96%   BMI 22 27 kg/m²     General Appearance:    Alert, cooperative, no distress, appears stated age   Head:    Normocephalic, without obvious abnormality, atraumatic   Eyes:    Scleral icterus   Ears:    Normal external ears   Nose:   Nares normal, septum midline, mucosa normal, no drainage    or sinus tenderness   Throat:   Lips, mucosa, and tongue normal; teeth and gums normal   Neck:   Supple   Back:     Symmetric, no curvature, ROM normal, no CVA tenderness   Lungs:     Decreased to auscultation bilaterally, respirations unlabored   Chest wall:    No tenderness or deformity   Heart:    Regular rate and rhythm, S1 and S2 normal, no murmur, rub   or gallop   Abdomen:     Soft, non-tender, bowel sounds active   Extremities:   Extremities no edema  Has cellulitis of RLE with mild improvement  No  edema   Skin:   Skin color as above - I no  lesions   Lymph nodes:   Cervical normal   Neurologic:   CNII-XII intact            Lab, Imaging and other studies: I have personally reviewed pertinent labs  CBC:   No results found for: WBC, HGB, HCT, MCV, PLT, ADJUSTEDWBC, MCH, MCHC, RDW, MPV, NRBC  CMP:   No results found for: NA, K, CL, CO2, ANIONGAP, BUN, CREATININE, GLUCOSE, CALCIUM, AST, ALT, ALKPHOS, PROT, ALBUMIN, BILITOT, EGFR          Results from last 7 days  Lab Units 06/06/18  0539 06/05/18  0805 06/04/18  0534 06/03/18  0500 06/02/18  0603   SODIUM mmol/L 130* 129* 129* 128* 127*   POTASSIUM mmol/L 3 7 3 9 4 0 3 9 4 2   CHLORIDE mmol/L 96* 93* 94* 94* 93*   CO2 mmol/L 26 28 26 27 26   BUN mg/dL 6 6 7 7 7   CREATININE mg/dL 0 56* 0 59* 0 60 0 63 0 54*   CALCIUM mg/dL 8 0* 8 3 8 1* 8 3 8 0*   TOTAL PROTEIN g/dL  --   --  7 1 7 1 7 1   BILIRUBIN TOTAL mg/dL  --   --  1 50* 1 60* 1 80*   ALK PHOS U/L  --   --  201* 211* 217*   ALT U/L  --   --  40 43 41   AST U/L  --   --  102* 121* 105*   GLUCOSE RANDOM mg/dL 86 90 85 87 80         Phosphorus:   No results found for: PHOS  Magnesium:   No results found for: MG  Urinalysis: No results found for: COLORU, CLARITYU, SPECGRAV, PHUR, LEUKOCYTESUR, NITRITE, PROTEINUA, GLUCOSEU, KETONESU, BILIRUBINUR, BLOODU  Ionized Calcium: No results found for: CAION  Coagulation: No results found for: PT, INR, APTT  Troponin: No results found for: TROPONINI  ABG: No results found for: PHART, NXJ3TUW, PO2ART, FVV7PEK, P2UXXBQE, BEART, SOURCE  Radiology review:     IMAGING  No results found      Current Facility-Administered Medications   Medication Dose Route Frequency    albuterol inhalation solution 2 5 mg  2 5 mg Nebulization Q6H PRN    cholecalciferol (VITAMIN D3) tablet 1,000 Units  1,000 Units Oral Daily    cyanocobalamin (VITAMIN B-12) tablet 100 mcg  100 mcg Oral Daily    diphenhydrAMINE (BENADRYL) injection 25 mg  25 mg Intravenous Q6H PRN    enoxaparin (LOVENOX) subcutaneous injection 40 mg  40 mg Subcutaneous J21T    folic acid (FOLVITE) tablet 1 mg  1 mg Oral Daily    gabapentin (NEURONTIN) capsule 300 mg  300 mg Oral TID    lisinopril (ZESTRIL) tablet 10 mg  10 mg Oral Daily    LORazepam (ATIVAN) 2 mg/mL injection 1 mg  1 mg Intravenous Q4H PRN    LORazepam (ATIVAN) tablet 0 5 mg  0 5 mg Oral BID    magnesium oxide (MAG-OX) tablet 800 mg  800 mg Oral TID    multivitamin-minerals (CENTRUM) tablet 1 tablet  1 tablet Oral Daily    nicotine (NICODERM CQ) 7 mg/24hr TD 24 hr patch 1 patch  1 patch Transdermal Daily    ondansetron (ZOFRAN) injection 4 mg  4 mg Intravenous Q6H PRN    oxyCODONE (ROXICODONE) IR tablet 5 mg  5 mg Oral BID PRN    potassium-sodium phosphateS (K-PHOS,PHOSPHA 250) -250 mg tablet 1 tablet  1 tablet Oral 4x Daily (with meals and at bedtime)    simethicone (MYLICON) chewable tablet 80 mg  80 mg Oral Q6H PRN    sulfamethoxazole-trimethoprim (BACTRIM DS) 800-160 mg per tablet 1 tablet  1 tablet Oral Q12H KIMBERLY    thiamine (VITAMIN B1) tablet 100 mg  100 mg Oral Daily    vancomycin (VANCOCIN) oral solution 125 mg  125 mg Oral Q12H [address]     Medications Discontinued During This Encounter   Medication Reason    Magnesium Oxide 400 MG CAPS     thiamine 100 MG tablet     folic acid (FOLVITE) tablet 1 mg Duplicate order    multivitamin stress formula tablet 1 tablet     LORazepam (ATIVAN) 2 mg/mL injection 0 5 mg     LORazepam (ATIVAN) 2 mg/mL injection 1 mg     levETIRAcetam (KEPPRA) 1,500 mg in sodium chloride 0 9 % 100 mL IVPB     magnesium sulfate 4 g/100 mL IVPB (premix) 4 g Alternate therapy    LORazepam (ATIVAN) 2 mg/mL injection 1 mg     potassium chloride (K-DUR,KLOR-CON) CR tablet 40 mEq     HYDROmorphone (DILAUDID) injection 0 5 mg     oxyCODONE (ROXICODONE) IR tablet 5 mg     magnesium sulfate 4 g/100 mL IVPB (premix) 4 g     magnesium sulfate 2 g/50 mL IVPB (premix) 2 g     sodium chloride 0 9 % infusion     LORazepam (ATIVAN) tablet 1 mg     magnesium oxide (MAG-OX) tablet 400 mg     ceFAZolin (ANCEF) IVPB (premix) 1,000 mg     vancomycin (VANCOCIN) 1,250 mg in sodium chloride 0 9 % 250 mL IVPB     magnesium sulfate 4 g/100 mL IVPB (premix) 4 g Availability    magnesium oxide (MAG-OX) tablet 800 mg     magnesium sulfate 4 g/100 mL IVPB (premix) 4 g Availability    LORazepam (ATIVAN) tablet 0 5 mg     levETIRAcetam (KEPPRA) tablet 1,500 mg     levofloxacin (LEVAQUIN) IVPB (premix) 750 mg     vancomycin (VANCOCIN) 1,500 mg in sodium chloride 0 9 % 500 mL IVPB        Yi Krause DO

## 2018-06-07 NOTE — PLAN OF CARE
Problem: DISCHARGE PLANNING - CARE MANAGEMENT  Goal: Discharge to post-acute care or home with appropriate resources  INTERVENTIONS:  - Conduct assessment to determine patient/family and health care team treatment goals, and need for post-acute services based on payer coverage, community resources, and patient preferences, and barriers to discharge  - Address psychosocial, clinical, and financial barriers to discharge as identified in assessment in conjunction with the patient/family and health care team  - Arrange appropriate level of post-acute services according to patient's   needs and preference and payer coverage in collaboration with the physician and health care team  - Communicate with and update the patient/family, physician, and health care team regarding progress on the discharge plan  - Arrange appropriate transportation to post-acute venues   Outcome: Completed Date Met: 06/07/18  -dc to [place] [facility] via [facility]

## 2018-06-20 LAB
BACTERIA WND AEROBE CULT: ABNORMAL
GRAM STN SPEC: ABNORMAL
GRAM STN SPEC: ABNORMAL

## 2018-06-27 ENCOUNTER — APPOINTMENT (OUTPATIENT)
Dept: LAB | Facility: MEDICAL CENTER | Age: 53
End: 2018-06-27
Payer: COMMERCIAL

## 2018-06-27 ENCOUNTER — TRANSCRIBE ORDERS (OUTPATIENT)
Dept: ADMINISTRATIVE | Facility: HOSPITAL | Age: 53
End: 2018-06-27

## 2018-06-27 DIAGNOSIS — E83.42 HYPOMAGNESEMIA: ICD-10-CM

## 2018-06-27 DIAGNOSIS — R74.01 ELEVATED SGOT: ICD-10-CM

## 2018-06-27 DIAGNOSIS — E87.1: Primary | ICD-10-CM

## 2018-06-27 DIAGNOSIS — E87.1: ICD-10-CM

## 2018-06-27 LAB
ALBUMIN SERPL BCP-MCNC: 3 G/DL (ref 3.5–5)
ALP SERPL-CCNC: 260 U/L (ref 46–116)
ALT SERPL W P-5'-P-CCNC: 30 U/L (ref 12–78)
ANION GAP SERPL CALCULATED.3IONS-SCNC: 9 MMOL/L (ref 4–13)
AST SERPL W P-5'-P-CCNC: 85 U/L (ref 5–45)
BILIRUB SERPL-MCNC: 0.79 MG/DL (ref 0.2–1)
BUN SERPL-MCNC: 4 MG/DL (ref 5–25)
CALCIUM SERPL-MCNC: 8.9 MG/DL (ref 8.3–10.1)
CHLORIDE SERPL-SCNC: 89 MMOL/L (ref 100–108)
CO2 SERPL-SCNC: 28 MMOL/L (ref 21–32)
CREAT SERPL-MCNC: 0.54 MG/DL (ref 0.6–1.3)
GFR SERPL CREATININE-BSD FRML MDRD: 122 ML/MIN/1.73SQ M
GLUCOSE SERPL-MCNC: 76 MG/DL (ref 65–140)
MAGNESIUM SERPL-MCNC: 1.7 MG/DL (ref 1.6–2.6)
POTASSIUM SERPL-SCNC: 3.7 MMOL/L (ref 3.5–5.3)
PROT SERPL-MCNC: 8.3 G/DL (ref 6.4–8.2)
SODIUM SERPL-SCNC: 126 MMOL/L (ref 136–145)

## 2018-06-27 PROCEDURE — 36415 COLL VENOUS BLD VENIPUNCTURE: CPT

## 2018-06-27 PROCEDURE — 80053 COMPREHEN METABOLIC PANEL: CPT

## 2018-06-27 PROCEDURE — 83735 ASSAY OF MAGNESIUM: CPT

## 2018-06-28 ENCOUNTER — OFFICE VISIT (OUTPATIENT)
Dept: SURGERY | Facility: HOSPITAL | Age: 53
End: 2018-06-28

## 2018-06-28 VITALS
BODY MASS INDEX: 21.83 KG/M2 | DIASTOLIC BLOOD PRESSURE: 77 MMHG | HEART RATE: 84 BPM | TEMPERATURE: 97.8 F | SYSTOLIC BLOOD PRESSURE: 100 MMHG | HEIGHT: 65 IN | WEIGHT: 131 LBS

## 2018-06-28 DIAGNOSIS — L02.419 CELLULITIS AND ABSCESS OF LOWER EXTREMITY: Primary | ICD-10-CM

## 2018-06-28 DIAGNOSIS — L03.119 CELLULITIS AND ABSCESS OF LOWER EXTREMITY: Primary | ICD-10-CM

## 2018-06-28 PROCEDURE — 99024 POSTOP FOLLOW-UP VISIT: CPT | Performed by: SURGERY

## 2018-06-30 PROBLEM — L02.419 CELLULITIS AND ABSCESS OF LOWER EXTREMITY: Status: RESOLVED | Noted: 2018-05-22 | Resolved: 2018-06-30

## 2018-06-30 PROBLEM — L03.119 CELLULITIS AND ABSCESS OF LOWER EXTREMITY: Status: RESOLVED | Noted: 2018-05-22 | Resolved: 2018-06-30

## 2018-06-30 NOTE — PROGRESS NOTES
Assessment/Plan:    Cellulitis and abscess of lower extremity  Status post incision and drainage of multiple small abscesses in the right lower extremity with associated cellulitis  Overall doing well  Right lower extremity appears very weak much improved compared to his recent admission  The abscess cavities have healed  There is some mild erythema but no pain or warmth and therefore I am less concerned  There is some mild swelling which has greatly improved as per the patient, since his discharge  He should continue to keep his right lower extremity elevated whenever sitting down  I suspect this will eventually resolve on its own  He finished his antibiotic course  Therefore he can follow up me on as-needed basis  Diagnoses and all orders for this visit:    Cellulitis and abscess of lower extremity          Subjective:      Patient ID: Iwona West is a 47 y o  male  60-year-old gentleman with a history of multiple medical problems including alcohol abuse, presents today for follow-up after recent admission [id number] [address]  [facility] with severe right lower extremity cellulitis associated cutaneous abscesses  While in the hospital he underwent incision and drainage of abscesses of the right lower extremity  Today he is doing very well  Denies any nausea vomiting  No fevers or chills  Ice states he has no pain or tenderness in the right lower extremity  He notes there is some mild redness and swelling  He states that the swelling which was significant while in the hospital as considerably gone down but is still present  The following portions of the patient's history were reviewed and updated as appropriate:   He  has a past medical history of Alcohol abuse; Bowel obstruction ([facility] [address] ); Bowel perforation ([facility] [address] ); Cardiac disease; Continuous chronic alcoholism ([facility] [address] ) (10/5/2017); COPD (chronic obstructive pulmonary disease) ([facility] [address] ); History of shoulder surgery;  History of transfusion; cervical spine surgery; Hypertension; Incisional hernia (10/8/2017); MI, old; Mitral regurgitation; Psychiatric disorder; and Seizures ([place] [address] )  He   Patient Active Problem List    Diagnosis Date Noted    Abscess of right leg 06/02/2018    Seizure-like activity ([place] [address] ) 05/22/2018    Diarrhea 05/22/2018    Hx of seizure disorder 10/12/2017    Incisional hernia 10/08/2017    Seizure ([place] [address] ) 10/05/2017    Continuous chronic alcoholism ([place] [address] ) 10/05/2017    Body mass index (BMI) 21 0-21 9, adult 05/18/2017    Malnutrition of moderate degree (HCC) 05/18/2017    Generalized weakness 19/69/9401    Alcoholic hepatitis without ascites 03/07/2017    Vitamin D deficiency 03/07/2017    Iron deficiency 03/07/2017    Elevated alkaline phosphatase level 01/14/2017    Hepatomegaly 01/12/2017    Hepatic steatosis 01/12/2017    Hypomagnesemia 01/12/2017    Cholelithiasis 01/11/2017    Hyponatremia 01/11/2017    Dietary folate deficiency anemia 01/11/2017    Alcohol dependence ([place] [address] ) 05/22/2016    Tobacco use 05/22/2016    Essential hypertension 05/22/2016    COPD (chronic obstructive pulmonary disease) ([place] [address] ) 05/22/2016    H/O suicide attempt 05/22/2016    H/O cervical spine surgery 05/22/2016     He  has a past surgical history that includes Mouth surgery; Shoulder surgery (Right); Back surgery; Small intestine surgery; Esophagogastroduodenoscopy (N/A, 11/28/2016); and LAPAROTOMY (N/A, 10/25/2016)  His family history is not on file  He  reports that he has been smoking  He has a 30 00 pack-year smoking history  He has never used smokeless tobacco  He reports that he drinks about 3 6 oz of alcohol per week   He reports that he does not use drugs    Current Outpatient Prescriptions   Medication Sig Dispense Refill    acetaminophen (TYLENOL) 325 mg tablet Take 2 tablets every 6 hours as needed 30 tablet 0    albuterol (2 5 mg/3 mL) 0 083 % nebulizer solution       albuterol (PROVENTIL HFA,VENTOLIN HFA) 90 mcg/act inhaler Inhale 2 puffs every 4 (four) hours as needed for wheezing or shortness of breath 1 Inhaler 0    cholecalciferol (VITAMIN D3) 1,000 units tablet Take 1 tablet by mouth daily      cyanocobalamin (VITAMIN B-12) 100 mcg tablet Take 1 tablet by mouth daily      folic acid (FOLVITE) 1 mg tablet Take 1 tablet by mouth daily 30 tablet 0    gabapentin (NEURONTIN) 100 mg capsule Take 3 capsules (300 mg total) by mouth 3 (three) times a day 90 capsule 0    levETIRAcetam (KEPPRA) 1000 MG tablet Take 1 tablet (1,000 mg total) by mouth every 12 (twelve) hours 60 tablet 0    lidocaine (LIDODERM) 5 % Apply 1 patch topically      lisinopril (ZESTRIL) 10 mg tablet Take 1 tablet (10 mg total) by mouth daily 30 tablet 3    magnesium oxide (MAG-OX) 400 mg Take 2 tablets (800 mg total) by mouth 3 (three) times a day  0    naltrexone (REVIA) 50 mg tablet Take 0 5 tablets (25 mg total) by mouth daily  0    nicotine (NICODERM CQ) 7 mg/24hr TD 24 hr patch Place 1 patch on the skin daily 28 patch 0    thiamine 100 MG tablet Take 1 tablet by mouth daily 30 tablet 0     No current facility-administered medications for this visit  He has No Known Allergies       Review of Systems   Constitutional: Negative  Gastrointestinal: Negative  Skin: Positive for color change (MildErythema the right lower extremity) and wound ( healed abscess cavities of the right lower extremity)  Objective:      /77   Pulse 84   Temp 97 8 °F (36 6 °C)   Ht 5' 5" (1 651 m)   Wt 59 4 kg (131 lb)   BMI 21 80 kg/m²          Physical Exam   Constitutional: He appears well-developed and well-nourished  No distress  Musculoskeletal: Normal range of motion  He exhibits edema (Noted edema the right lower extremity  Comparatively speaking this is significantly improved compared to his hospital admission  )  Skin: Skin is warm  No rash noted  He is not diaphoretic  There is erythema (Mild erythema the right lower extremity)   No pallor  Noted healing abscess cavities of the right lower extremity  Vitals reviewed

## 2018-06-30 NOTE — ASSESSMENT & PLAN NOTE
Status post incision and drainage of multiple small abscesses in the right lower extremity with associated cellulitis  Overall doing well  Right lower extremity appears very weak much improved compared to his recent admission  The abscess cavities have healed  There is some mild erythema but no pain or warmth and therefore I am less concerned  There is some mild swelling which has greatly improved as per the patient, since his discharge  He should continue to keep his right lower extremity elevated whenever sitting down  I suspect this will eventually resolve on its own  He finished his antibiotic course  Therefore he can follow up me on as-needed basis

## 2018-07-17 ENCOUNTER — OFFICE VISIT (OUTPATIENT)
Dept: FAMILY MEDICINE CLINIC | Facility: CLINIC | Age: 53
End: 2018-07-17
Payer: COMMERCIAL

## 2018-07-17 VITALS
RESPIRATION RATE: 18 BRPM | TEMPERATURE: 97.6 F | DIASTOLIC BLOOD PRESSURE: 70 MMHG | HEART RATE: 98 BPM | WEIGHT: 136 LBS | HEIGHT: 65 IN | OXYGEN SATURATION: 96 % | BODY MASS INDEX: 22.66 KG/M2 | SYSTOLIC BLOOD PRESSURE: 104 MMHG

## 2018-07-17 DIAGNOSIS — E87.1 HYPONATREMIA: ICD-10-CM

## 2018-07-17 DIAGNOSIS — F10.230 ALCOHOL DEPENDENCE WITH UNCOMPLICATED WITHDRAWAL (HCC): ICD-10-CM

## 2018-07-17 DIAGNOSIS — M54.9 DORSALGIA: ICD-10-CM

## 2018-07-17 DIAGNOSIS — J44.9 CHRONIC OBSTRUCTIVE PULMONARY DISEASE, UNSPECIFIED COPD TYPE (HCC): ICD-10-CM

## 2018-07-17 DIAGNOSIS — R19.7 DIARRHEA, UNSPECIFIED TYPE: Primary | ICD-10-CM

## 2018-07-17 DIAGNOSIS — I10 ESSENTIAL HYPERTENSION, BENIGN: ICD-10-CM

## 2018-07-17 DIAGNOSIS — E53.8 FOLIC ACID DEFICIENCY: ICD-10-CM

## 2018-07-17 DIAGNOSIS — M50.10 CERVICAL DISC SYNDROME: ICD-10-CM

## 2018-07-17 DIAGNOSIS — E83.42 HYPOMAGNESEMIA: ICD-10-CM

## 2018-07-17 DIAGNOSIS — F10.10 ALCOHOL ABUSE: ICD-10-CM

## 2018-07-17 PROBLEM — F32.A DEPRESSION: Status: ACTIVE | Noted: 2017-02-08

## 2018-07-17 PROBLEM — G57.93 NEUROPATHY INVOLVING BOTH LOWER EXTREMITIES: Status: ACTIVE | Noted: 2017-08-15

## 2018-07-17 PROBLEM — N32.89 BLADDER WALL THICKENING: Status: ACTIVE | Noted: 2017-03-09

## 2018-07-17 PROBLEM — G40.909 SEIZURE DISORDER (HCC): Status: ACTIVE | Noted: 2017-07-29

## 2018-07-17 PROBLEM — S22.059A T6 VERTEBRAL FRACTURE (HCC): Status: ACTIVE | Noted: 2017-09-21

## 2018-07-17 PROBLEM — Q45.3 ABNORMALITY OF PANCREATIC DUCT: Status: ACTIVE | Noted: 2017-11-02

## 2018-07-17 PROCEDURE — T1015 CLINIC SERVICE: HCPCS | Performed by: FAMILY MEDICINE

## 2018-07-17 RX ORDER — LANOLIN ALCOHOL/MO/W.PET/CERES
100 CREAM (GRAM) TOPICAL DAILY
Qty: 30 TABLET | Refills: 3 | Status: SHIPPED | OUTPATIENT
Start: 2018-07-17 | End: 2019-09-20 | Stop reason: SDUPTHER

## 2018-07-17 RX ORDER — NALTREXONE HYDROCHLORIDE 50 MG/1
25 TABLET, FILM COATED ORAL DAILY
Qty: 30 TABLET | Refills: 1
Start: 2018-07-17 | End: 2018-10-17

## 2018-07-17 RX ORDER — LISINOPRIL 10 MG/1
10 TABLET ORAL DAILY
Qty: 30 TABLET | Refills: 5 | Status: SHIPPED | OUTPATIENT
Start: 2018-07-17 | End: 2019-09-20 | Stop reason: SDUPTHER

## 2018-07-17 RX ORDER — SODIUM CHLORIDE 1000 MG
1 TABLET, SOLUBLE MISCELLANEOUS 3 TIMES DAILY
Qty: 90 TABLET | Refills: 3 | Status: SHIPPED | OUTPATIENT
Start: 2018-07-17 | End: 2020-08-29 | Stop reason: HOSPADM

## 2018-07-17 RX ORDER — GABAPENTIN 100 MG/1
100 CAPSULE ORAL 3 TIMES DAILY
Qty: 90 CAPSULE | Refills: 5 | Status: SHIPPED | OUTPATIENT
Start: 2018-07-17 | End: 2019-04-10 | Stop reason: SDUPTHER

## 2018-07-17 RX ORDER — LEVETIRACETAM 1000 MG/1
1000 TABLET ORAL EVERY 12 HOURS SCHEDULED
Qty: 60 TABLET | Refills: 5 | Status: SHIPPED | OUTPATIENT
Start: 2018-07-17 | End: 2019-05-08 | Stop reason: SDUPTHER

## 2018-07-17 RX ORDER — UBIDECARENONE 75 MG
100 CAPSULE ORAL DAILY
Qty: 30 TABLET | Refills: 5 | Status: SHIPPED | OUTPATIENT
Start: 2018-07-17

## 2018-07-17 RX ORDER — ALBUTEROL SULFATE 90 UG/1
2 AEROSOL, METERED RESPIRATORY (INHALATION) EVERY 4 HOURS PRN
Qty: 1 INHALER | Refills: 0 | Status: SHIPPED | OUTPATIENT
Start: 2018-07-17 | End: 2018-12-04 | Stop reason: SDUPTHER

## 2018-07-17 RX ORDER — FOLIC ACID 1 MG/1
1 TABLET ORAL DAILY
Qty: 30 TABLET | Refills: 5 | Status: SHIPPED | OUTPATIENT
Start: 2018-07-17 | End: 2019-09-20 | Stop reason: SDUPTHER

## 2018-07-17 RX ORDER — ALBUTEROL SULFATE 2.5 MG/3ML
2.5 SOLUTION RESPIRATORY (INHALATION) EVERY 6 HOURS PRN
Qty: 100 VIAL | Refills: 3 | Status: ON HOLD | OUTPATIENT
Start: 2018-07-17 | End: 2020-08-20 | Stop reason: ALTCHOICE

## 2018-07-17 NOTE — PROGRESS NOTES
History and Physical  Erwin Renee 47 y o  male MRN: [id number]      Assessment:   HTN  Diarrhea  COPD  ETOH abuse      Plan:  Meds refilled as needed today  Stool studies  Stop smoking  Keep appt with GI as scheduled  Will call pt with lab results  RTC 3 months  Chief Complaint   Patient presents with    Follow-up     Patient is here for regular check up and refills         HPI:  Erwin Renee is a 47 y o  male who presents for above  He reports he is doing better since his last admission  He is on fluid restrictions 84 oz per day  He reports no ETOH since June 14th  He continues to smoke  Admits to some bilateral leg swelling at times  He continues to  have diarrhea after eating anything  Historical Information   Past Medical History:   Diagnosis Date    Alcohol abuse     Bowel obstruction (HCC)     Bowel perforation (HCC)     Cardiac disease     Continuous chronic alcoholism ([facility] [address] ) 10/5/2017    COPD (chronic obstructive pulmonary disease) (HCC)     History of shoulder surgery     Right shoulder    History of transfusion     Hx of cervical spine surgery     Hypertension     Incisional hernia 10/8/2017    MI, old     Mitral regurgitation     Psychiatric disorder     Seizures ([facility] [address] )      Past Surgical History:   Procedure Laterality Date    BACK SURGERY      ESOPHAGOGASTRODUODENOSCOPY N/A 11/28/2016    Procedure: ESOPHAGOGASTRODUODENOSCOPY (EGD); Surgeon: Suman Solis MD;  [facility];   Service:     LAPAROTOMY N/A 10/25/2016    Procedure: LAPAROTOMY EXPLORATORY;  Surgeon: Nasir Diaz MD;  Location: [facility];  Service:    [address] Right     SMALL INTESTINE SURGERY       Social History   History   Alcohol Use    3 6 oz/week    6 Cans of beer per week     Comment: 6 25oz cans a day     History   Drug Use No     History   Smoking Status    Current Every Day Smoker    Packs/day: 2 00    Years: 15 00   Smokeless Tobacco    Never Used History reviewed  No pertinent family history  Meds/Allergies   Allergies   Allergen Reactions    Cholestatin        Meds:    Current Outpatient Prescriptions:     acetaminophen (TYLENOL) 325 mg tablet, Take 2 tablets every 6 hours as needed, Disp: 30 tablet, Rfl: 0    albuterol (2 5 mg/3 mL) 0 083 % nebulizer solution, , Disp: , Rfl:     albuterol (PROVENTIL HFA,VENTOLIN HFA) 90 mcg/act inhaler, Inhale 2 puffs every 4 (four) hours as needed for wheezing or shortness of breath, Disp: 1 Inhaler, Rfl: 0    cholecalciferol (VITAMIN D3) 1,000 units tablet, Take 1 tablet by mouth daily, Disp: , Rfl:     cyanocobalamin (VITAMIN B-12) 100 mcg tablet, Take 1 tablet by mouth daily, Disp: , Rfl:     folic acid (FOLVITE) 1 mg tablet, Take 1 tablet by mouth daily, Disp: 30 tablet, Rfl: 0    gabapentin (NEURONTIN) 100 mg capsule, Take 3 capsules (300 mg total) by mouth 3 (three) times a day, Disp: 90 capsule, Rfl: 0    levETIRAcetam (KEPPRA) 1000 MG tablet, Take 1 tablet (1,000 mg total) by mouth every 12 (twelve) hours, Disp: 60 tablet, Rfl: 0    lidocaine (LIDODERM) 5 %, Apply 1 patch topically, Disp: , Rfl:     lisinopril (ZESTRIL) 10 mg tablet, Take 1 tablet (10 mg total) by mouth daily, Disp: 30 tablet, Rfl: 3    magnesium oxide (MAG-OX) 400 mg, Take 2 tablets (800 mg total) by mouth 3 (three) times a day, Disp: , Rfl: 0    naltrexone (REVIA) 50 mg tablet, Take 0 5 tablets (25 mg total) by mouth daily, Disp: , Rfl: 0    nicotine (NICODERM CQ) 7 mg/24hr TD 24 hr patch, Place 1 patch on the skin daily, Disp: 28 patch, Rfl: 0    thiamine 100 MG tablet, Take 1 tablet by mouth daily, Disp: 30 tablet, Rfl: 0      REVIEW OF SYSTEMS  Review of Systems   Constitutional: Negative  HENT: Negative  Eyes: Negative  Respiratory: Negative  Cardiovascular: Negative  Gastrointestinal: Positive for diarrhea  Endocrine: Negative  Genitourinary: Negative  Musculoskeletal: Negative      Skin: Negative  Allergic/Immunologic: Negative  Neurological: Negative  Hematological: Negative  Psychiatric/Behavioral: Negative  Current Vitals:   Blood Pressure: 104/70 (07/17/18 1015)  Pulse: 98 (07/17/18 1015)  Temperature: 97 6 °F (36 4 °C) (07/17/18 1015)  Respirations: 18 (07/17/18 1015)  Height: 5' 5" (165 1 cm) (07/17/18 1015)  Weight - Scale: 61 7 kg (136 lb) (07/17/18 1015)  SpO2: 96 % (07/17/18 1015)  Body mass index is 22 63 kg/m²  PHYSICAL EXAMS:  Physical Exam   Constitutional: He is oriented to person, place, and time  He appears well-developed  HENT:   Head: Normocephalic and atraumatic  Right Ear: External ear normal    Left Ear: External ear normal    Eyes: EOM are normal  Pupils are equal, round, and reactive to light  Neck: Normal range of motion  Neck supple  No thyromegaly present  Cardiovascular: Normal rate, regular rhythm and normal heart sounds  Pulmonary/Chest: Effort normal and breath sounds normal  He has no wheezes  He has no rales  Musculoskeletal:   Trace edema bilaterally   Neurological: He is alert and oriented to person, place, and time  Psychiatric: He has a normal mood and affect  Lab Results:          Doris Hoang PA-C  7/17/2018, 10:33 AM

## 2018-07-18 ENCOUNTER — TRANSCRIBE ORDERS (OUTPATIENT)
Dept: ADMINISTRATIVE | Facility: HOSPITAL | Age: 53
End: 2018-07-18

## 2018-07-18 ENCOUNTER — APPOINTMENT (OUTPATIENT)
Dept: LAB | Facility: MEDICAL CENTER | Age: 53
End: 2018-07-18
Payer: COMMERCIAL

## 2018-07-18 DIAGNOSIS — E87.6 HYPOPOTASSEMIA: ICD-10-CM

## 2018-07-18 DIAGNOSIS — E83.42 HYPOMAGNESEMIA: Primary | ICD-10-CM

## 2018-07-18 DIAGNOSIS — E83.42 HYPOMAGNESEMIA: ICD-10-CM

## 2018-07-18 DIAGNOSIS — R19.7 DIARRHEA, UNSPECIFIED TYPE: ICD-10-CM

## 2018-07-18 LAB
ANION GAP SERPL CALCULATED.3IONS-SCNC: 10 MMOL/L (ref 4–13)
BUN SERPL-MCNC: 4 MG/DL (ref 5–25)
CALCIUM SERPL-MCNC: 8.6 MG/DL (ref 8.3–10.1)
CHLORIDE SERPL-SCNC: 97 MMOL/L (ref 100–108)
CO2 SERPL-SCNC: 24 MMOL/L (ref 21–32)
CREAT SERPL-MCNC: 0.5 MG/DL (ref 0.6–1.3)
GFR SERPL CREATININE-BSD FRML MDRD: 125 ML/MIN/1.73SQ M
GLUCOSE SERPL-MCNC: 86 MG/DL (ref 65–140)
MAGNESIUM SERPL-MCNC: 1.7 MG/DL (ref 1.6–2.6)
POTASSIUM SERPL-SCNC: 4 MMOL/L (ref 3.5–5.3)
SODIUM SERPL-SCNC: 131 MMOL/L (ref 136–145)
WBC STL QL MICRO: NORMAL

## 2018-07-18 PROCEDURE — 87205 SMEAR GRAM STAIN: CPT

## 2018-07-18 PROCEDURE — 87177 OVA AND PARASITES SMEARS: CPT

## 2018-07-18 PROCEDURE — 36415 COLL VENOUS BLD VENIPUNCTURE: CPT

## 2018-07-18 PROCEDURE — 87209 SMEAR COMPLEX STAIN: CPT

## 2018-07-18 PROCEDURE — 87505 NFCT AGENT DETECTION GI: CPT

## 2018-07-18 PROCEDURE — 83735 ASSAY OF MAGNESIUM: CPT

## 2018-07-18 PROCEDURE — 80048 BASIC METABOLIC PNL TOTAL CA: CPT

## 2018-07-19 LAB
CAMPYLOBACTER DNA SPEC NAA+PROBE: NORMAL
SALMONELLA DNA SPEC QL NAA+PROBE: NORMAL
SHIGA TOXIN STX GENE SPEC NAA+PROBE: NORMAL
SHIGELLA DNA SPEC QL NAA+PROBE: NORMAL

## 2018-07-20 LAB — O+P STL CONC: NORMAL

## 2018-07-27 DIAGNOSIS — E83.42 HYPOMAGNESEMIA: ICD-10-CM

## 2018-07-30 DIAGNOSIS — E83.42 HYPOMAGNESEMIA: ICD-10-CM

## 2018-08-01 ENCOUNTER — TELEPHONE (OUTPATIENT)
Dept: FAMILY MEDICINE CLINIC | Facility: CLINIC | Age: 53
End: 2018-08-01

## 2018-10-08 ENCOUNTER — TELEPHONE (OUTPATIENT)
Dept: FAMILY MEDICINE CLINIC | Facility: CLINIC | Age: 53
End: 2018-10-08

## 2018-10-08 NOTE — TELEPHONE ENCOUNTER
[place] in [place] EKG, CT scan, and lab work that was done on 10/3/2018 for surgery on 10/19/2018 for hernia

## 2018-10-10 ENCOUNTER — OFFICE VISIT (OUTPATIENT)
Dept: FAMILY MEDICINE CLINIC | Facility: CLINIC | Age: 53
End: 2018-10-10
Payer: COMMERCIAL

## 2018-10-10 VITALS
DIASTOLIC BLOOD PRESSURE: 82 MMHG | TEMPERATURE: 97.6 F | BODY MASS INDEX: 24.66 KG/M2 | SYSTOLIC BLOOD PRESSURE: 110 MMHG | HEIGHT: 65 IN | RESPIRATION RATE: 16 BRPM | HEART RATE: 112 BPM | OXYGEN SATURATION: 97 % | WEIGHT: 148 LBS

## 2018-10-10 DIAGNOSIS — D64.9 LOW HEMOGLOBIN AND LOW HEMATOCRIT: ICD-10-CM

## 2018-10-10 DIAGNOSIS — D64.9 LOW HEMOGLOBIN AND LOW HEMATOCRIT: Primary | ICD-10-CM

## 2018-10-10 PROBLEM — K70.30 ALCOHOLIC CIRRHOSIS OF LIVER WITHOUT ASCITES (HCC): Status: ACTIVE | Noted: 2018-08-21

## 2018-10-10 PROBLEM — R56.9 SEIZURE (HCC): Status: RESOLVED | Noted: 2017-10-05 | Resolved: 2018-10-10

## 2018-10-10 LAB
BASOPHILS # BLD AUTO: 0.07 THOUSANDS/ΜL (ref 0–0.1)
BASOPHILS NFR BLD AUTO: 1 % (ref 0–1)
EOSINOPHIL # BLD AUTO: 0.14 THOUSAND/ΜL (ref 0–0.61)
EOSINOPHIL NFR BLD AUTO: 2 % (ref 0–6)
ERYTHROCYTE [DISTWIDTH] IN BLOOD BY AUTOMATED COUNT: 20.4 % (ref 11.6–15.1)
HCT VFR BLD AUTO: 36 % (ref 36.5–49.3)
HGB BLD-MCNC: 10.9 G/DL (ref 12–17)
IMM GRANULOCYTES # BLD AUTO: 0.03 THOUSAND/UL (ref 0–0.2)
IMM GRANULOCYTES NFR BLD AUTO: 0 % (ref 0–2)
LYMPHOCYTES # BLD AUTO: 1.74 THOUSANDS/ΜL (ref 0.6–4.47)
LYMPHOCYTES NFR BLD AUTO: 19 % (ref 14–44)
MCH RBC QN AUTO: 22.1 PG (ref 26.8–34.3)
MCHC RBC AUTO-ENTMCNC: 30.3 G/DL (ref 31.4–37.4)
MCV RBC AUTO: 73 FL (ref 82–98)
MONOCYTES # BLD AUTO: 1.13 THOUSAND/ΜL (ref 0.17–1.22)
MONOCYTES NFR BLD AUTO: 12 % (ref 4–12)
NEUTROPHILS # BLD AUTO: 6.16 THOUSANDS/ΜL (ref 1.85–7.62)
NEUTS SEG NFR BLD AUTO: 66 % (ref 43–75)
NRBC BLD AUTO-RTO: 0 /100 WBCS
PLATELET # BLD AUTO: 247 THOUSANDS/UL (ref 149–390)
PMV BLD AUTO: 10.5 FL (ref 8.9–12.7)
RBC # BLD AUTO: 4.94 MILLION/UL (ref 3.88–5.62)
WBC # BLD AUTO: 9.27 THOUSAND/UL (ref 4.31–10.16)

## 2018-10-10 PROCEDURE — T1015 CLINIC SERVICE: HCPCS | Performed by: FAMILY MEDICINE

## 2018-10-10 PROCEDURE — 85025 COMPLETE CBC W/AUTO DIFF WBC: CPT | Performed by: PHYSICIAN ASSISTANT

## 2018-10-10 NOTE — PROGRESS NOTES
History and Physical  Tarik Cárdenas 47 y o  male MRN: [id number]      Assessment:   Pre op exam    Plan:  Labs and EKG reviewed  I will repeat CBC today  Will clear after results obtained  10/17/18:addendum-after review od repeat CBC he is cleared for started surgical procedure  Chief Complaint   Patient presents with    Pre-op Exam     10/19/2018 aFra         HPI:  Tarik Cárdenas is a 47 y o  male who presents for pre op exam prior to incisional hernia repair scheduled for next week  Pt reports he will also be having his gall bladder removed  He has pain every day  He otherwise has no complaints  He denies any ETOH use in the past 1 month  No cigarettes but is vaping  Historical Information   Past Medical History:   Diagnosis Date    Alcohol abuse     Bowel obstruction (HCC)     Bowel perforation (HCC)     Cardiac disease     Continuous chronic alcoholism ([facility] [address] ) 10/5/2017    COPD (chronic obstructive pulmonary disease) (HCC)     History of shoulder surgery     Right shoulder    History of transfusion     Hx of cervical spine surgery     Hypertension     Incisional hernia 10/8/2017    MI, old     Mitral regurgitation     Psychiatric disorder     Seizures ([facility] [address] )      Past Surgical History:   Procedure Laterality Date    BACK SURGERY      ESOPHAGOGASTRODUODENOSCOPY N/A 11/28/2016    Procedure: ESOPHAGOGASTRODUODENOSCOPY (EGD); Surgeon: Zeke Cordon MD;  [facility];   Service:     LAPAROTOMY N/A 10/25/2016    Procedure: LAPAROTOMY EXPLORATORY;  Surgeon: Elise Smith MD;  [facility];  Service:    [address]     SMALL INTESTINE SURGERY       Social History   History   Alcohol Use    3 6 oz/week    6 Cans of beer per week     Comment: 6 25oz cans a day     History   Drug Use No     History   Smoking Status    Current Every Day Smoker    Packs/day: 2 00    Years: 15 00   Smokeless Tobacco    Never Used     No family history on file  Meds/Allergies   Allergies   Allergen Reactions    Cholestatin        Meds:    Current Outpatient Prescriptions:     acetaminophen (TYLENOL) 325 mg tablet, Take 2 tablets every 6 hours as needed, Disp: 30 tablet, Rfl: 0    albuterol (2 5 mg/3 mL) 0 083 % nebulizer solution, Take 1 vial (2 5 mg total) by nebulization every 6 (six) hours as needed for wheezing or shortness of breath, Disp: 100 vial, Rfl: 3    albuterol (PROVENTIL HFA,VENTOLIN HFA) 90 mcg/act inhaler, Inhale 2 puffs every 4 (four) hours as needed for wheezing or shortness of breath, Disp: 1 Inhaler, Rfl: 0    cholecalciferol (VITAMIN D3) 1,000 units tablet, Take 1 tablet (1,000 Units total) by mouth daily, Disp: 30 tablet, Rfl: 5    cyanocobalamin (VITAMIN B-12) 100 mcg tablet, Take 1 tablet (100 mcg total) by mouth daily, Disp: 30 tablet, Rfl: 5    folic acid (FOLVITE) 1 mg tablet, Take 1 tablet (1 mg total) by mouth daily, Disp: 30 tablet, Rfl: 5    gabapentin (NEURONTIN) 100 mg capsule, Take 1 capsule (100 mg total) by mouth 3 (three) times a day, Disp: 90 capsule, Rfl: 5    levETIRAcetam (KEPPRA) 1000 MG tablet, Take 1 tablet (1,000 mg total) by mouth every 12 (twelve) hours, Disp: 60 tablet, Rfl: 5    lisinopril (ZESTRIL) 10 mg tablet, Take 1 tablet (10 mg total) by mouth daily, Disp: 30 tablet, Rfl: 5    magnesium oxide (MAG-OX) 400 mg, Take 2 tablets (800 mg total) by mouth 3 (three) times a day, Disp: 180 tablet, Rfl: 3    naltrexone (REVIA) 50 mg tablet, Take 0 5 tablets (25 mg total) by mouth daily, Disp: 30 tablet, Rfl: 1    sodium chloride 1 g tablet, Take 1 tablet (1 g total) by mouth 3 (three) times a day, Disp: 90 tablet, Rfl: 3    thiamine 100 MG tablet, Take 1 tablet (100 mg total) by mouth daily, Disp: 30 tablet, Rfl: 3      REVIEW OF SYSTEMS  Review of Systems   Constitutional: Negative  HENT: Negative  Eyes: Negative  Respiratory: Negative  Cardiovascular: Negative      Gastrointestinal: As per HPI   Endocrine: Negative  Genitourinary: Negative  Musculoskeletal: Negative  Skin: Negative  Allergic/Immunologic: Negative  Neurological: Negative  Hematological: Negative  Psychiatric/Behavioral: Negative  Current Vitals:   Blood Pressure: 110/82 (10/10/18 0937)  Pulse: (!) 112 (10/10/18 0937)  Temperature: 97 6 °F (36 4 °C) (10/10/18 0937)  Temp Source: Tympanic (10/10/18 2018)  Respirations: 16 (10/10/18 0937)  Height: 5' 5" (165 1 cm) (10/10/18 9664)  Weight - Scale: 67 1 kg (148 lb) (10/10/18 0937)  SpO2: 97 % (10/10/18 0937)  Body mass index is 24 63 kg/m²  PHYSICAL EXAMS:  Physical Exam   Constitutional: He is oriented to person, place, and time  He appears well-developed and well-nourished  HENT:   Head: Normocephalic and atraumatic  Right Ear: External ear normal    Left Ear: External ear normal    Eyes: Pupils are equal, round, and reactive to light  EOM are normal    Neck: Normal range of motion  Neck supple  No thyromegaly present  Cardiovascular: Regular rhythm and normal heart sounds  Tachycardiac but RRR   Pulmonary/Chest: Effort normal and breath sounds normal  He has no wheezes  He has no rales  Abdominal:   Large palpable mass  Tender to palpation  Musculoskeletal: He exhibits no edema  Lymphadenopathy:     He has no cervical adenopathy  Neurological: He is alert and oriented to person, place, and time  No cranial nerve deficit  Skin: Skin is warm and dry  Psychiatric: He has a normal mood and affect  Lab Results:          Umm Hernandez PA-C  10/10/2018, 9:42 AM

## 2018-10-17 ENCOUNTER — OFFICE VISIT (OUTPATIENT)
Dept: FAMILY MEDICINE CLINIC | Facility: CLINIC | Age: 53
End: 2018-10-17
Payer: COMMERCIAL

## 2018-10-17 VITALS
HEART RATE: 91 BPM | DIASTOLIC BLOOD PRESSURE: 78 MMHG | WEIGHT: 142 LBS | HEIGHT: 65 IN | BODY MASS INDEX: 23.66 KG/M2 | RESPIRATION RATE: 17 BRPM | SYSTOLIC BLOOD PRESSURE: 130 MMHG | TEMPERATURE: 97.2 F | OXYGEN SATURATION: 96 %

## 2018-10-17 DIAGNOSIS — I10 ESSENTIAL HYPERTENSION: Primary | ICD-10-CM

## 2018-10-17 DIAGNOSIS — J44.9 CHRONIC OBSTRUCTIVE PULMONARY DISEASE, UNSPECIFIED COPD TYPE (HCC): ICD-10-CM

## 2018-10-17 DIAGNOSIS — G40.909 SEIZURE DISORDER (HCC): ICD-10-CM

## 2018-10-17 PROCEDURE — T1015 CLINIC SERVICE: HCPCS | Performed by: FAMILY MEDICINE

## 2018-10-17 NOTE — PROGRESS NOTES
History and Physical  Cary Ford 47 y o  male MRN: [id number]      Assessment:   HTN  COPD  Neuropathy  Seizure disorder    Plan:  Continue current meds  May proceed with scheduled surgery  RTC 4 months  Chief Complaint   Patient presents with    Follow-up     no new complaints        HPI:  Cary Ford is a 47 y o  male who presents for follow up  He is doing better and is scheduled for hernia repair later this week  He continues to smoke  He has no complaints today  He reports no seizures and would like his drivers license back  Historical Information   Past Medical History:   Diagnosis Date    Alcohol abuse     Bowel obstruction (HCC)     Bowel perforation (HCC)     Cardiac disease     Continuous chronic alcoholism ([facility] [address] [facility] 10/5/2017    COPD (chronic obstructive pulmonary disease) (HCC)     History of shoulder surgery     Right shoulder    History of transfusion     Hx of cervical spine surgery     Hypertension     Incisional hernia 10/8/2017    MI, old     Mitral regurgitation     Psychiatric disorder     Seizures ([facility] [address] )      Past Surgical History:   Procedure Laterality Date    BACK SURGERY      ESOPHAGOGASTRODUODENOSCOPY N/A 11/28/2016    Procedure: ESOPHAGOGASTRODUODENOSCOPY (EGD); Surgeon: Jonathan Mena MD;  [facility]; Service:     LAPAROTOMY N/A 10/25/2016    Procedure: LAPAROTOMY EXPLORATORY;  Surgeon: Kong Gomez MD;  [facility];  Service:    [address]     SMALL INTESTINE SURGERY       Social History   History   Alcohol Use    3 6 oz/week    6 Cans of beer per week     Comment: 6 25oz cans a day     History   Drug Use No     History   Smoking Status    Current Every Day Smoker    Packs/day: 2 00    Years: 15 00   Smokeless Tobacco    Never Used     No family history on file      Meds/Allergies   Allergies   Allergen Reactions    Cholestatin        Meds:    Current Outpatient Prescriptions:     acetaminophen (TYLENOL) 325 mg tablet, Take 2 tablets every 6 hours as needed, Disp: 30 tablet, Rfl: 0    albuterol (2 5 mg/3 mL) 0 083 % nebulizer solution, Take 1 vial (2 5 mg total) by nebulization every 6 (six) hours as needed for wheezing or shortness of breath, Disp: 100 vial, Rfl: 3    albuterol (PROVENTIL HFA,VENTOLIN HFA) 90 mcg/act inhaler, Inhale 2 puffs every 4 (four) hours as needed for wheezing or shortness of breath, Disp: 1 Inhaler, Rfl: 0    cholecalciferol (VITAMIN D3) 1,000 units tablet, Take 1 tablet (1,000 Units total) by mouth daily, Disp: 30 tablet, Rfl: 5    cyanocobalamin (VITAMIN B-12) 100 mcg tablet, Take 1 tablet (100 mcg total) by mouth daily, Disp: 30 tablet, Rfl: 5    folic acid (FOLVITE) 1 mg tablet, Take 1 tablet (1 mg total) by mouth daily, Disp: 30 tablet, Rfl: 5    gabapentin (NEURONTIN) 100 mg capsule, Take 1 capsule (100 mg total) by mouth 3 (three) times a day, Disp: 90 capsule, Rfl: 5    levETIRAcetam (KEPPRA) 1000 MG tablet, Take 1 tablet (1,000 mg total) by mouth every 12 (twelve) hours, Disp: 60 tablet, Rfl: 5    lisinopril (ZESTRIL) 10 mg tablet, Take 1 tablet (10 mg total) by mouth daily, Disp: 30 tablet, Rfl: 5    magnesium oxide (MAG-OX) 400 mg, Take 2 tablets (800 mg total) by mouth 3 (three) times a day, Disp: 180 tablet, Rfl: 3    sodium chloride 1 g tablet, Take 1 tablet (1 g total) by mouth 3 (three) times a day, Disp: 90 tablet, Rfl: 3    thiamine 100 MG tablet, Take 1 tablet (100 mg total) by mouth daily, Disp: 30 tablet, Rfl: 3      REVIEW OF SYSTEMS  Review of Systems   Constitutional: Negative  HENT: Negative  Eyes: Negative  Respiratory: Negative  Cardiovascular: Negative  Gastrointestinal:        As per HPI   Endocrine: Negative  Genitourinary: Negative  Musculoskeletal: Negative  Skin: Negative  Allergic/Immunologic: Negative  Neurological: Negative  Hematological: Negative  Psychiatric/Behavioral: Negative          Current Vitals:   Blood Pressure: 130/78 (10/17/18 0858)  Pulse: 91 (10/17/18 0858)  Temperature: (!) 97 2 °F (36 2 °C) (10/17/18 0858)  Respirations: 17 (10/17/18 0858)  Height: 5' 5" (165 1 cm) (10/17/18 0858)  Weight - Scale: 64 4 kg (142 lb) (10/17/18 0858)  SpO2: 96 % (10/17/18 0858)  Body mass index is 23 63 kg/m²  PHYSICAL EXAMS:  Physical Exam   Constitutional: He is oriented to person, place, and time  He appears well-developed and well-nourished  HENT:   Head: Normocephalic and atraumatic  Right Ear: External ear normal    Left Ear: External ear normal    Mouth/Throat: Oropharynx is clear and moist    Eyes: Pupils are equal, round, and reactive to light  Neck: Normal range of motion  Neck supple  No thyromegaly present  Cardiovascular: Normal rate, regular rhythm and normal heart sounds  Pulmonary/Chest: Effort normal and breath sounds normal  He has no wheezes  He has no rales  Musculoskeletal: He exhibits no edema  Neurological: He is alert and oriented to person, place, and time  Skin: Skin is warm and dry  Psychiatric: He has a normal mood and affect  Lab Results:          Vishal Gould PA-C  10/17/2018, 9:01 AM

## 2018-12-04 DIAGNOSIS — J44.9 CHRONIC OBSTRUCTIVE PULMONARY DISEASE, UNSPECIFIED COPD TYPE (HCC): ICD-10-CM

## 2018-12-19 ENCOUNTER — HOSPITAL ENCOUNTER (EMERGENCY)
Facility: HOSPITAL | Age: 53
Discharge: ANOTHER HOSPITAL | End: 2018-12-20
Attending: EMERGENCY MEDICINE | Admitting: EMERGENCY MEDICINE
Payer: COMMERCIAL

## 2018-12-19 ENCOUNTER — APPOINTMENT (EMERGENCY)
Dept: CT IMAGING | Facility: HOSPITAL | Age: 53
End: 2018-12-19
Payer: COMMERCIAL

## 2018-12-19 DIAGNOSIS — K37 APPENDICITIS: Primary | ICD-10-CM

## 2018-12-19 LAB
ALBUMIN SERPL BCP-MCNC: 3.4 G/DL (ref 3.5–5)
ALP SERPL-CCNC: 114 U/L (ref 46–116)
ALT SERPL W P-5'-P-CCNC: 16 U/L (ref 12–78)
ANION GAP SERPL CALCULATED.3IONS-SCNC: 9 MMOL/L (ref 4–13)
APTT PPP: 35 SECONDS (ref 26–38)
AST SERPL W P-5'-P-CCNC: 15 U/L (ref 5–45)
BASOPHILS # BLD AUTO: 0.05 THOUSANDS/ΜL (ref 0–0.1)
BASOPHILS NFR BLD AUTO: 1 % (ref 0–1)
BILIRUB SERPL-MCNC: 0.5 MG/DL (ref 0.2–1)
BUN SERPL-MCNC: 3 MG/DL (ref 5–25)
CALCIUM SERPL-MCNC: 9 MG/DL (ref 8.3–10.1)
CHLORIDE SERPL-SCNC: 90 MMOL/L (ref 100–108)
CO2 SERPL-SCNC: 28 MMOL/L (ref 21–32)
CREAT SERPL-MCNC: 0.54 MG/DL (ref 0.6–1.3)
EOSINOPHIL # BLD AUTO: 0.11 THOUSAND/ΜL (ref 0–0.61)
EOSINOPHIL NFR BLD AUTO: 1 % (ref 0–6)
ERYTHROCYTE [DISTWIDTH] IN BLOOD BY AUTOMATED COUNT: 19.3 % (ref 11.6–15.1)
GFR SERPL CREATININE-BSD FRML MDRD: 121 ML/MIN/1.73SQ M
GLUCOSE SERPL-MCNC: 118 MG/DL (ref 65–140)
HCT VFR BLD AUTO: 40.2 % (ref 36.5–49.3)
HGB BLD-MCNC: 12.9 G/DL (ref 12–17)
IMM GRANULOCYTES # BLD AUTO: 0.04 THOUSAND/UL (ref 0–0.2)
IMM GRANULOCYTES NFR BLD AUTO: 0 % (ref 0–2)
INR PPP: 1.15 (ref 0.86–1.17)
LACTATE SERPL-SCNC: 1 MMOL/L (ref 0.5–2)
LIPASE SERPL-CCNC: 162 U/L (ref 73–393)
LYMPHOCYTES # BLD AUTO: 1.89 THOUSANDS/ΜL (ref 0.6–4.47)
LYMPHOCYTES NFR BLD AUTO: 21 % (ref 14–44)
MCH RBC QN AUTO: 27.6 PG (ref 26.8–34.3)
MCHC RBC AUTO-ENTMCNC: 32.1 G/DL (ref 31.4–37.4)
MCV RBC AUTO: 86 FL (ref 82–98)
MONOCYTES # BLD AUTO: 1.48 THOUSAND/ΜL (ref 0.17–1.22)
MONOCYTES NFR BLD AUTO: 16 % (ref 4–12)
NEUTROPHILS # BLD AUTO: 5.46 THOUSANDS/ΜL (ref 1.85–7.62)
NEUTS SEG NFR BLD AUTO: 61 % (ref 43–75)
NRBC BLD AUTO-RTO: 0 /100 WBCS
PLATELET # BLD AUTO: 184 THOUSANDS/UL (ref 149–390)
PMV BLD AUTO: 9 FL (ref 8.9–12.7)
POTASSIUM SERPL-SCNC: 3.4 MMOL/L (ref 3.5–5.3)
PROT SERPL-MCNC: 8.1 G/DL (ref 6.4–8.2)
PROTHROMBIN TIME: 14.2 SECONDS (ref 11.8–14.2)
RBC # BLD AUTO: 4.67 MILLION/UL (ref 3.88–5.62)
SODIUM SERPL-SCNC: 127 MMOL/L (ref 136–145)
WBC # BLD AUTO: 9.03 THOUSAND/UL (ref 4.31–10.16)

## 2018-12-19 PROCEDURE — 85730 THROMBOPLASTIN TIME PARTIAL: CPT | Performed by: PHYSICIAN ASSISTANT

## 2018-12-19 PROCEDURE — 96375 TX/PRO/DX INJ NEW DRUG ADDON: CPT

## 2018-12-19 PROCEDURE — 83690 ASSAY OF LIPASE: CPT | Performed by: PHYSICIAN ASSISTANT

## 2018-12-19 PROCEDURE — 83605 ASSAY OF LACTIC ACID: CPT | Performed by: PHYSICIAN ASSISTANT

## 2018-12-19 PROCEDURE — 80053 COMPREHEN METABOLIC PANEL: CPT | Performed by: PHYSICIAN ASSISTANT

## 2018-12-19 PROCEDURE — 85610 PROTHROMBIN TIME: CPT | Performed by: PHYSICIAN ASSISTANT

## 2018-12-19 PROCEDURE — 96376 TX/PRO/DX INJ SAME DRUG ADON: CPT

## 2018-12-19 PROCEDURE — 74177 CT ABD & PELVIS W/CONTRAST: CPT

## 2018-12-19 PROCEDURE — 99285 EMERGENCY DEPT VISIT HI MDM: CPT

## 2018-12-19 PROCEDURE — 85025 COMPLETE CBC W/AUTO DIFF WBC: CPT | Performed by: PHYSICIAN ASSISTANT

## 2018-12-19 PROCEDURE — 96361 HYDRATE IV INFUSION ADD-ON: CPT

## 2018-12-19 PROCEDURE — 96365 THER/PROPH/DIAG IV INF INIT: CPT

## 2018-12-19 RX ORDER — DIPHENHYDRAMINE HCL 25 MG
25 TABLET ORAL ONCE
Status: COMPLETED | OUTPATIENT
Start: 2018-12-19 | End: 2018-12-19

## 2018-12-19 RX ORDER — DIPHENHYDRAMINE HYDROCHLORIDE 50 MG/ML
25 INJECTION INTRAMUSCULAR; INTRAVENOUS EVERY 6 HOURS PRN
Status: DISCONTINUED | OUTPATIENT
Start: 2018-12-19 | End: 2018-12-20 | Stop reason: HOSPADM

## 2018-12-19 RX ORDER — MORPHINE SULFATE 4 MG/ML
4 INJECTION, SOLUTION INTRAMUSCULAR; INTRAVENOUS ONCE
Status: COMPLETED | OUTPATIENT
Start: 2018-12-19 | End: 2018-12-19

## 2018-12-19 RX ORDER — SODIUM CHLORIDE 9 MG/ML
125 INJECTION, SOLUTION INTRAVENOUS CONTINUOUS
Status: DISCONTINUED | OUTPATIENT
Start: 2018-12-19 | End: 2018-12-20 | Stop reason: HOSPADM

## 2018-12-19 RX ADMIN — IOHEXOL 50 ML: 240 INJECTION, SOLUTION INTRATHECAL; INTRAVASCULAR; INTRAVENOUS; ORAL at 17:59

## 2018-12-19 RX ADMIN — PREDNISONE 50 MG: 20 TABLET ORAL at 18:08

## 2018-12-19 RX ADMIN — DIPHENHYDRAMINE HYDROCHLORIDE 25 MG: 50 INJECTION, SOLUTION INTRAMUSCULAR; INTRAVENOUS at 22:09

## 2018-12-19 RX ADMIN — SODIUM CHLORIDE 1000 ML: 0.9 INJECTION, SOLUTION INTRAVENOUS at 16:39

## 2018-12-19 RX ADMIN — MORPHINE SULFATE 4 MG: 4 INJECTION INTRAVENOUS at 20:01

## 2018-12-19 RX ADMIN — IOHEXOL 100 ML: 350 INJECTION, SOLUTION INTRAVENOUS at 17:59

## 2018-12-19 RX ADMIN — Medication 3.38 G: at 20:07

## 2018-12-19 RX ADMIN — DIPHENHYDRAMINE HCL 25 MG: 25 TABLET, FILM COATED ORAL at 16:55

## 2018-12-19 RX ADMIN — SODIUM CHLORIDE 125 ML/HR: 0.9 INJECTION, SOLUTION INTRAVENOUS at 19:54

## 2018-12-19 RX ADMIN — MORPHINE SULFATE 4 MG: 4 INJECTION INTRAVENOUS at 16:39

## 2018-12-19 NOTE — ED NOTES
Patient c/o itching because he is allergic to the bed sheets  He has hives on his back, benadryl and prednisone given for relief  Norman Garcia [facility] aware  Patient did void, but not a bowel movement  No other complaints at this time       Yoseph Williamson RN  12/19/18 [id number]

## 2018-12-19 NOTE — ED PROVIDER NOTES
History  Chief Complaint   Patient presents with    Constipation     Patient has been unable to have a BM for the past 4 days  Patient presents to the emergency department today offering a chief complaint abdominal pain with constipation  Patient states the symptoms have been present for the last 4 days  Use over-the-counter laxatives without relief  He said he had a small amount liquid stool  Denies black or red contents  Patient denies nausea  States he vomited once 2 days ago but none since  She has a normal appetite  Denies urinary complaints  No fever  He does state that he had abdominal surgery through [facility] about 2 months ago which she had a large hernia removed as well as a cholecystectomy  Prior to Admission Medications   Prescriptions Last Dose Informant Patient Reported? Taking?    VENTOLIN  (90 Base) MCG/ACT inhaler   No No   Sig: INHALE 2 PUFFS BY MOUTH EVERY 4 HOURS AS NEEDED FOR WHEEZING OR SHORTNESS OF BREATH   acetaminophen (TYLENOL) 325 mg tablet   No No   Sig: Take 2 tablets every 6 hours as needed   albuterol (2 5 mg/3 mL) 0 083 % nebulizer solution   No No   Sig: Take 1 vial (2 5 mg total) by nebulization every 6 (six) hours as needed for wheezing or shortness of breath   cholecalciferol (VITAMIN D3) 1,000 units tablet   No No   Sig: Take 1 tablet (1,000 Units total) by mouth daily   cyanocobalamin (VITAMIN B-12) 100 mcg tablet   No No   Sig: Take 1 tablet (100 mcg total) by mouth daily   folic acid (FOLVITE) 1 mg tablet   No No   Sig: Take 1 tablet (1 mg total) by mouth daily   gabapentin (NEURONTIN) 100 mg capsule   No No   Sig: Take 1 capsule (100 mg total) by mouth 3 (three) times a day   levETIRAcetam (KEPPRA) 1000 MG tablet   No No   Sig: Take 1 tablet (1,000 mg total) by mouth every 12 (twelve) hours   lisinopril (ZESTRIL) 10 mg tablet   No No   Sig: Take 1 tablet (10 mg total) by mouth daily   magnesium oxide (MAG-OX) 400 mg   No No   Sig: Take 2 tablets (800 mg total) by mouth 3 (three) times a day   sodium chloride 1 g tablet   No No   Sig: Take 1 tablet (1 g total) by mouth 3 (three) times a day   thiamine 100 MG tablet   No No   Sig: Take 1 tablet (100 mg total) by mouth daily      Facility-Administered Medications: None       Past Medical History:   Diagnosis Date    Alcohol abuse     Bowel obstruction (HCC)     Bowel perforation (HCC)     Cardiac disease     [facility] 10/5/2017    [facility]chronic obstructive pulmonary disease) ([facility] )     History of shoulder surgery     Right shoulder    History of transfusion     Hx of cervical spine surgery     Hypertension     Incisional hernia 10/8/2017    MI, old     Mitral regurgitation     Psychiatric disorder     Seizures ([facility] )        Past Surgical History:   Procedure Laterality Date    BACK SURGERY      ESOPHAGOGASTRODUODENOSCOPY N/A 11/28/2016    Procedure: ESOPHAGOGASTRODUODENOSCOPY (EGD); Surgeon: Victor Manuel Parkinson MD;  Location:  GI LAB; Service:     LAPAROTOMY N/A 10/25/2016    Procedure: LAPAROTOMY EXPLORATORY;  Surgeon: Kerry Byrd MD;  Location: [facility];  Service:    [address] Right     SMALL INTESTINE SURGERY         History reviewed  No pertinent family history  I have reviewed and agree with the history as documented  Social History   Substance Use Topics    Smoking status: Current Every Day Smoker     Packs/day: 2 00     Years: 15 00    Smokeless tobacco: Never Used    Alcohol use 3 6 oz/week     6 Cans of beer per week      Comment: 6 25oz cans a day        Review of Systems   Constitutional: Negative  HENT: Negative  Eyes: Negative  Respiratory: Negative  Cardiovascular: Negative  Gastrointestinal: Positive for abdominal pain, constipation and vomiting  Negative for abdominal distention, anal bleeding, blood in stool, diarrhea, nausea and rectal pain  Endocrine: Negative      Genitourinary: Negative  Musculoskeletal: Negative  Skin: Negative  Allergic/Immunologic: Negative  Neurological: Negative  Hematological: Negative  Psychiatric/Behavioral: Negative  All other systems reviewed and are negative  Physical Exam  Physical Exam   Constitutional: He is oriented to person, place, and time  He appears well-developed and well-nourished  No distress  HENT:   Head: Normocephalic  Eyes: Pupils are equal, round, and reactive to light  Neck: Normal range of motion  Cardiovascular: Normal rate, regular rhythm, normal heart sounds and intact distal pulses  Exam reveals no gallop and no friction rub  No murmur heard  Pulmonary/Chest: Effort normal and breath sounds normal  No respiratory distress  He has no wheezes  He has no rales  He exhibits no tenderness  Abdominal: Soft  Surgical scars noted  No evidence of erythema or drainage  No wound dehiscence  He does have some abdominal distension with right lower and left lower abdominal quadrant tenderness  Musculoskeletal: Normal range of motion  Neurological: He is alert and oriented to person, place, and time  Skin: Skin is warm  Capillary refill takes less than 2 seconds  He is not diaphoretic  Vitals reviewed        Vital Signs  ED Triage Vitals [12/19/18 1605]   Temperature Pulse Respirations Blood Pressure SpO2   97 5 °F (36 4 °C) 99 18 131/83 98 %      Temp src Heart Rate Source Patient Position - Orthostatic VS BP Location FiO2 (%)   -- Monitor Sitting Right arm --      Pain Score       Worst Possible Pain           Vitals:    12/19/18 1605 12/19/18 1810   BP: 131/83 150/87   Pulse: 99 103   Patient Position - Orthostatic VS: Sitting Sitting       Visual Acuity      ED Medications  Medications   piperacillin-tazobactam (ZOSYN) 3 375 g in sodium chloride 0 9 % 50 mL IVPB (not administered)   sodium chloride 0 9 % infusion (not administered)   morphine (PF) 4 mg/mL injection 4 mg (not administered)   sodium chloride 0 9 % bolus 1,000 mL (0 mL Intravenous Stopped 12/19/18 1807)   morphine (PF) 4 mg/mL injection 4 mg (4 mg Intravenous Given 12/19/18 1639)   iohexol (OMNIPAQUE) 240 MG/ML solution 50 mL (50 mL Oral Given 12/19/18 1759)   diphenhydrAMINE (BENADRYL) tablet 25 mg (25 mg Oral Given 12/19/18 1655)   predniSONE tablet 50 mg (50 mg Oral Given 12/19/18 1808)   iohexol (OMNIPAQUE) 350 MG/ML injection (MULTI-DOSE) 100 mL (100 mL Intravenous Given 12/19/18 1759)       Diagnostic Studies  Results Reviewed     Procedure Component Value Units Date/Time    Lactic acid, plasma [[id number]]  (Normal) Collected:  12/19/18 1628    Lab Status:  Final result Specimen:  Blood from Arm, Left Updated:  12/19/18 1654     LACTIC ACID 1 0 mmol/L     Narrative:         Result may be elevated if tourniquet was used during collection      Protime-INR [[id number]]  (Normal) Collected:  12/19/18 1628    Lab Status:  Final result Specimen:  Blood from Arm, Left Updated:  12/19/18 1653     Protime 14 2 seconds      INR 1 15    APTT [[id number]]  (Normal) Collected:  12/19/18 1628    Lab Status:  Final result Specimen:  Blood from Arm, Left Updated:  12/19/18 1653     PTT 35 seconds     Lipase [[id number]]  (Normal) Collected:  12/19/18 1628    Lab Status:  Final result Specimen:  Blood from Arm, Left Updated:  12/19/18 1649     Lipase 162 u/L     Comprehensive metabolic panel [[id number]]  (Abnormal) Collected:  12/19/18 1628    Lab Status:  Final result Specimen:  Blood from Arm, Left Updated:  12/19/18 1649     Sodium 127 (L) mmol/L      Potassium 3 4 (L) mmol/L      Chloride 90 (L) mmol/L      CO2 28 mmol/L      ANION GAP 9 mmol/L      BUN 3 (L) mg/dL      Creatinine 0 54 (L) mg/dL      Glucose 118 mg/dL      Calcium 9 0 mg/dL      AST 15 U/L      ALT 16 U/L      Alkaline Phosphatase 114 U/L      Total Protein 8 1 g/dL      Albumin 3 4 (L) g/dL      Total Bilirubin 0 50 mg/dL      eGFR 121 ml/min/1 73sq m     Narrative:         Consolidated [place] Kid[employer] Disease Education Program recommendations are as follows:  GFR calculation is accurate only with a steady state creatinine  Chronic Kidney disease less than 60 ml/min/1 73 sq  meters  Kidney failure less than 15 ml/min/1 73 sq  meters  CBC and differential [[id number]]  (Abnormal) Collected:  12/19/18 1628    Lab Status:  Final result Specimen:  Blood from Arm, Left Updated:  12/19/18 1635     WBC 9 03 Thousand/uL      RBC 4 67 Million/uL      Hemoglobin 12 9 g/dL      Hematocrit 40 2 %      MCV 86 fL      MCH 27 6 pg      MCHC 32 1 g/dL      RDW 19 3 (H) %      MPV 9 0 fL      Platelets 682 Thousands/uL      nRBC 0 /100 WBCs      Neutrophils Relative 61 %      Immat GRANS % 0 %      Lymphocytes Relative 21 %      Monocytes Relative 16 (H) %      Eosinophils Relative 1 %      Basophils Relative 1 %      Neutrophils Absolute 5 46 Thousands/µL      Immature Grans Absolute 0 04 Thousand/uL      Lymphocytes Absolute 1 89 Thousands/µL      Monocytes Absolute 1 48 (H) Thousand/µL      Eosinophils Absolute 0 11 Thousand/µL      Basophils Absolute 0 05 Thousands/µL                  CT abdomen pelvis with contrast   Final Result by Samuel Carpio MD (12/19 1850)         1  Extensive inflammation in the right lower quadrant and 3 8 cm collection consistent with an abscess, likely secondary to perforated appendicitis  Few punctate foci of gas adjacent to the collection may represent free intraperitoneal air  2   Right lower quadrant 6 mm calcification likely representing appendicolith, not clearly within the appendiceal lumen  Appendix nearly completely obscured by inflammation and presumed periappendiceal abscess        I personally discussed this study with Shanteladelaida Davis on 12/19/2018 at 6:26 PM                      Workstation performed: [id number]                    Procedures  Procedures       Phone Contacts  ED Phone Contact    ED Course  ED Course as of Dec 19 1951   Wed Dec 19, 2018   1702 Sodium: (!) 127   1706 Chloride: (!) 90   1706 Calcium: 9 0   1706 TOTAL BILIRUBIN: 0 50   1706 ALT: 16   1706 RDW: (!) 19 3   1706 Red Blood Cell Count: 4 67   1706 INR: 1 15   1706 Lipase: 162   1706 LACTIC ACID: 1 0   1819 Awaiting CT read    1904 Impression         1   Extensive inflammation in the right lower quadrant and 3 8 cm collection consistent with an abscess, likely secondary to perforated appendicitis   Few punctate foci of gas adjacent to the collection may represent free intraperitoneal air  2   Right lower quadrant 6 mm calcification likely representing appendicolith, not clearly within the appendiceal lumen   Appendix nearly completely obscured by inflammation and presumed periappendiceal abscess  I personally discussed this study with Aye Blair on 12/19/2018 at 6:26 PM         1915 PACS aware                                MDM  CritCare Time    Disposition  Final diagnoses:   Appendicitis     Time reflects when diagnosis was documented in both MDM as applicable and the Disposition within this note     Time User Action Codes Description Comment    12/19/2018  7:38 PM Tyrel Navarrete Appendicitis       ED Disposition     ED Disposition Condition Comment    Transfer to Another [facility] should be transferred out to [facility]        MD Documentation      Most Recent Value   Patient Condition  The patient has been stabilized such that within reasonable medical probability, no material deterioration of the patient condition or the condition of the unborn child(al) is likely to result from the transfer   Reason for Transfer  Patient/Family request   Risks of Transfer  Potential for delay in receiving treatment   Accepting Physician  Dr Dena Farooq Name, [facility] MD Delcid/Jennifer   Provider Certification  General risk, such as traffic hazards, adverse weather conditions, rough terrain or turbulence, possible failure of equipment (including vehicle or aircraft), or consequences of actions of persons outside the control of the transport personnel      RN Documentation      [facility] Name, [facility]      Follow-up Information    None         Patient's Medications   Discharge Prescriptions    No medications on file     No discharge procedures on file      ED Provider  Electronically Signed by           Hima Weaver PA-C  12/19/18 [facility]  12/19/18 1942       Hima Weaver PA-C  12/19/18 1951

## 2018-12-20 VITALS
TEMPERATURE: 97.5 F | OXYGEN SATURATION: 96 % | DIASTOLIC BLOOD PRESSURE: 82 MMHG | SYSTOLIC BLOOD PRESSURE: 124 MMHG | WEIGHT: 135 LBS | HEART RATE: 94 BPM | RESPIRATION RATE: 18 BRPM | BODY MASS INDEX: 22.47 KG/M2

## 2018-12-20 PROCEDURE — 96376 TX/PRO/DX INJ SAME DRUG ADON: CPT

## 2018-12-20 PROCEDURE — 96361 HYDRATE IV INFUSION ADD-ON: CPT

## 2018-12-20 RX ORDER — MORPHINE SULFATE 4 MG/ML
4 INJECTION, SOLUTION INTRAMUSCULAR; INTRAVENOUS ONCE
Status: COMPLETED | OUTPATIENT
Start: 2018-12-20 | End: 2018-12-20

## 2018-12-20 RX ADMIN — MORPHINE SULFATE 4 MG: 4 INJECTION INTRAVENOUS at 01:17

## 2018-12-20 NOTE — ED NOTES
2000 Violet Davila from patient access called that the patient is being transferred to [facility] to room  by [facility] at 12:00 midnight   The receiving doctor is Dr Kasia Resendez ([id number] Alvin SMALL RN  12/19/18 2022

## 2018-12-20 NOTE — EMTALA/ACUTE CARE TRANSFER
[address] [facility] [address]  [address] [facility] [address]  Dept: [phone]      EMTALA TRANSFER CONSENT    NAME Beth Julian                                         1965                              MRN [id number]    I have been informed of my rights regarding examination, treatment, and transfer   by Dr Delcid/Jennifer No att  providers found    Benefits:      Risks: Potential for delay in receiving treatment      Transfer Request   I acknowledge that my medical condition has been evaluated and explained to me by the emergency department physician or other qualified medical person and/or my attending physician who has recommended and offered to me further medical examination and treatment  I understand the Hospital's obligation with respect to the treatment and stabilization of my emergency medical condition  I nevertheless request to be transferred  I release the Hospital, the doctor, and any other persons caring for me from all responsibility or liability for any injury or ill effects that may result from my transfer and agree to accept all responsibility for the consequences of my choice to transfer, rather than receive stabilizing treatment at the Hospital  I understand that because the transfer is my request, my insurance may not provide reimbursement for the services  The Hospital will assist and direct me and my family in how to make arrangements for transfer, but the hospital is not liable for any fees charged by the transport service  In spite of this understanding, I refuse to consent to further medical examination and treatment which has been offered to me, and request transfer to  [place] Name, [address] [address] : [facility] [place]  I authorize the performance of emergency medical procedures and treatments upon me in both transit and upon arrival at the receiving facility    Additionally, I authorize the release of any and all medical records to the receiving facility and request they be transported with me, if possible  I authorize the performance of emergency medical procedures and treatments upon me in both transit and upon arrival at the receiving facility  Additionally, I authorize the release of any and all medical records to the receiving facility and request they be transported with me, if possible  I understand that the safest mode of transportation during a medical emergency is an ambulance and that the Hospital advocates the use of this mode of transport  Risks of traveling to the receiving facility by car, including absence of medical control, life sustaining equipment, such as oxygen, and medical personnel has been explained to me and I fully understand them  (ESTHER CORRECT BOX BELOW)  [  ]  I consent to the stated transfer and to be transported by ambulance/helicopter  [  ]  I consent to the stated transfer, but refuse transportation by ambulance and accept full responsibility for my transportation by car  I understand the risks of non-ambulance transfers and I exonerate the Hospital and its staff from any deterioration in my condition that results from this refusal     X___________________________________________    DATE  18  TIME________  Signature of patient or legally responsible individual signing on patient behalf           RELATIONSHIP TO PATIENT_________________________          Provider Certification    NAME Patriciachito Razia                                         1965                              MRN [id number]    A medical screening exam was performed on the above named patient  Based on the examination:    Condition Necessitating Transfer The encounter diagnosis was Appendicitis      Patient Condition: The patient has been stabilized such that within reasonable medical probability, no material deterioration of the patient condition or the condition of the unborn child(al) is likely to result from the transfer    Reason for Transfer: Patient/Family request    Transfer Requirements: [facility] [address]   · Space available and qualified personnel available for treatment as acknowledged by    · Agreed to accept transfer and to provide appropriate medical treatment as acknowledged by       Dr Kt Echevarria  · Appropriate medical records of the examination and treatment of the patient are provided at the time of transfer   [address] [email or web address] _______  · Transfer will be performed by qualified personnel from    and appropriate transfer equipment as required, including the use of necessary and appropriate life support measures  Provider Certification: I have examined the patient and explained the following risks and benefits of being transferred/refusing transfer to the patient/family:  General risk, such as traffic hazards, adverse weather conditions, rough terrain or turbulence, possible failure of equipment (including vehicle or aircraft), or consequences of actions of persons outside the control of the transport personnel      Based on these reasonable risks and benefits to the patient and/or the unborn child(al), and based upon the information available at the time of the patients examination, I certify that the medical benefits reasonably to be expected from the provision of appropriate medical treatments at another medical facility outweigh the increasing risks, if any, to the individuals medical condition, and in the case of labor to the unborn child, from effecting the transfer      X____________________________________________ DATE 12/19/18        TIME_______      ORIGINAL - SEND TO MEDICAL RECORDS   COPY - SEND WITH PATIENT DURING TRANSFER

## 2018-12-20 NOTE — ED NOTES
Patient c/o itchiness because of the hospital sheets  Dr Hossein Kang aware, verbal order of benadryl 25 mg IV       Mariana Cazares RN  12/19/18 6479

## 2019-01-17 DIAGNOSIS — J44.9 CHRONIC OBSTRUCTIVE PULMONARY DISEASE, UNSPECIFIED COPD TYPE (HCC): ICD-10-CM

## 2019-01-17 RX ORDER — ALBUTEROL SULFATE 90 UG/1
2 AEROSOL, METERED RESPIRATORY (INHALATION) EVERY 4 HOURS PRN
Qty: 1 INHALER | Refills: 3 | Status: SHIPPED | OUTPATIENT
Start: 2019-01-17 | End: 2019-10-25 | Stop reason: SDUPTHER

## 2019-01-21 ENCOUNTER — HOSPITAL ENCOUNTER (EMERGENCY)
Facility: HOSPITAL | Age: 54
Discharge: HOME | End: 2019-01-21
Attending: EMERGENCY MEDICINE | Admitting: EMERGENCY MEDICINE
Payer: COMMERCIAL

## 2019-01-21 ENCOUNTER — APPOINTMENT (EMERGENCY)
Dept: RADIOLOGY | Facility: HOSPITAL | Age: 54
End: 2019-01-21
Payer: COMMERCIAL

## 2019-01-21 ENCOUNTER — APPOINTMENT (EMERGENCY)
Dept: CT IMAGING | Facility: HOSPITAL | Age: 54
End: 2019-01-21
Payer: COMMERCIAL

## 2019-01-21 VITALS
DIASTOLIC BLOOD PRESSURE: 84 MMHG | RESPIRATION RATE: 16 BRPM | WEIGHT: 149.69 LBS | OXYGEN SATURATION: 94 % | HEART RATE: 99 BPM | SYSTOLIC BLOOD PRESSURE: 142 MMHG | TEMPERATURE: 97.6 F | BODY MASS INDEX: 24.91 KG/M2

## 2019-01-21 DIAGNOSIS — R10.31 RIGHT LOWER QUADRANT ABDOMINAL PAIN: Primary | ICD-10-CM

## 2019-01-21 DIAGNOSIS — Z72.89 ALCOHOL USE: ICD-10-CM

## 2019-01-21 DIAGNOSIS — E83.42 HYPOMAGNESEMIA: ICD-10-CM

## 2019-01-21 LAB
ALBUMIN SERPL BCP-MCNC: 3.8 G/DL (ref 3.5–5)
ALP SERPL-CCNC: 176 U/L (ref 46–116)
ALT SERPL W P-5'-P-CCNC: 18 U/L (ref 12–78)
ANION GAP SERPL CALCULATED.3IONS-SCNC: 11 MMOL/L (ref 4–13)
AST SERPL W P-5'-P-CCNC: 32 U/L (ref 5–45)
ATRIAL RATE: 85 BPM
BASE EX.OXY STD BLDV CALC-SCNC: 55 % (ref 60–80)
BASE EXCESS BLDV CALC-SCNC: 1.5 MMOL/L
BASOPHILS # BLD AUTO: 0.05 THOUSANDS/ΜL (ref 0–0.1)
BASOPHILS NFR BLD AUTO: 1 % (ref 0–1)
BILIRUB SERPL-MCNC: 0.25 MG/DL (ref 0.2–1)
BILIRUB UR QL STRIP: NEGATIVE
BUN SERPL-MCNC: 6 MG/DL (ref 5–25)
CALCIUM SERPL-MCNC: 9 MG/DL (ref 8.3–10.1)
CHLORIDE SERPL-SCNC: 95 MMOL/L (ref 100–108)
CLARITY UR: CLEAR
CO2 SERPL-SCNC: 28 MMOL/L (ref 21–32)
COLOR UR: YELLOW
CREAT SERPL-MCNC: 0.69 MG/DL (ref 0.6–1.3)
EOSINOPHIL # BLD AUTO: 0.35 THOUSAND/ΜL (ref 0–0.61)
EOSINOPHIL NFR BLD AUTO: 5 % (ref 0–6)
ERYTHROCYTE [DISTWIDTH] IN BLOOD BY AUTOMATED COUNT: 16.4 % (ref 11.6–15.1)
ETHANOL SERPL-MCNC: 140 MG/DL (ref 0–3)
GFR SERPL CREATININE-BSD FRML MDRD: 109 ML/MIN/1.73SQ M
GLUCOSE SERPL-MCNC: 93 MG/DL (ref 65–140)
GLUCOSE UR STRIP-MCNC: NEGATIVE MG/DL
HCO3 BLDV-SCNC: 26.6 MMOL/L (ref 24–30)
HCT VFR BLD AUTO: 41.8 % (ref 36.5–49.3)
HGB BLD-MCNC: 13.5 G/DL (ref 12–17)
HGB UR QL STRIP.AUTO: NEGATIVE
IMM GRANULOCYTES # BLD AUTO: 0.02 THOUSAND/UL (ref 0–0.2)
IMM GRANULOCYTES NFR BLD AUTO: 0 % (ref 0–2)
INR PPP: 1.06 (ref 0.86–1.17)
KETONES UR STRIP-MCNC: NEGATIVE MG/DL
LEUKOCYTE ESTERASE UR QL STRIP: NEGATIVE
LIPASE SERPL-CCNC: 268 U/L (ref 73–393)
LYMPHOCYTES # BLD AUTO: 3.31 THOUSANDS/ΜL (ref 0.6–4.47)
LYMPHOCYTES NFR BLD AUTO: 48 % (ref 14–44)
MAGNESIUM SERPL-MCNC: 1.5 MG/DL (ref 1.6–2.6)
MCH RBC QN AUTO: 28.3 PG (ref 26.8–34.3)
MCHC RBC AUTO-ENTMCNC: 32.3 G/DL (ref 31.4–37.4)
MCV RBC AUTO: 88 FL (ref 82–98)
MONOCYTES # BLD AUTO: 0.87 THOUSAND/ΜL (ref 0.17–1.22)
MONOCYTES NFR BLD AUTO: 12 % (ref 4–12)
NEUTROPHILS # BLD AUTO: 2.39 THOUSANDS/ΜL (ref 1.85–7.62)
NEUTS SEG NFR BLD AUTO: 34 % (ref 43–75)
NITRITE UR QL STRIP: NEGATIVE
NRBC BLD AUTO-RTO: 0 /100 WBCS
O2 CT BLDV-SCNC: 11.8 ML/DL
P AXIS: 70 DEGREES
PCO2 BLDV: 43.6 MM HG (ref 42–50)
PH BLDV: 7.4 [PH] (ref 7.3–7.4)
PH UR STRIP.AUTO: 7 [PH] (ref 4.5–8)
PLATELET # BLD AUTO: 134 THOUSANDS/UL (ref 149–390)
PMV BLD AUTO: 8.9 FL (ref 8.9–12.7)
PO2 BLDV: 28.1 MM HG (ref 35–45)
POTASSIUM SERPL-SCNC: 3.8 MMOL/L (ref 3.5–5.3)
PR INTERVAL: 152 MS
PROT SERPL-MCNC: 8.4 G/DL (ref 6.4–8.2)
PROT UR STRIP-MCNC: NEGATIVE MG/DL
PROTHROMBIN TIME: 13.3 SECONDS (ref 11.8–14.2)
QRS AXIS: -40 DEGREES
QRSD INTERVAL: 92 MS
QT INTERVAL: 406 MS
QTC INTERVAL: 483 MS
RBC # BLD AUTO: 4.77 MILLION/UL (ref 3.88–5.62)
SODIUM SERPL-SCNC: 134 MMOL/L (ref 136–145)
SP GR UR STRIP.AUTO: <=1.005 (ref 1–1.03)
T WAVE AXIS: 40 DEGREES
TROPONIN I SERPL-MCNC: <0.02 NG/ML
UROBILINOGEN UR QL STRIP.AUTO: 0.2 E.U./DL
VENTRICULAR RATE: 85 BPM
WBC # BLD AUTO: 6.99 THOUSAND/UL (ref 4.31–10.16)

## 2019-01-21 PROCEDURE — 96365 THER/PROPH/DIAG IV INF INIT: CPT

## 2019-01-21 PROCEDURE — 80053 COMPREHEN METABOLIC PANEL: CPT | Performed by: EMERGENCY MEDICINE

## 2019-01-21 PROCEDURE — 80320 DRUG SCREEN QUANTALCOHOLS: CPT | Performed by: EMERGENCY MEDICINE

## 2019-01-21 PROCEDURE — 74177 CT ABD & PELVIS W/CONTRAST: CPT

## 2019-01-21 PROCEDURE — 99285 EMERGENCY DEPT VISIT HI MDM: CPT

## 2019-01-21 PROCEDURE — 83690 ASSAY OF LIPASE: CPT | Performed by: EMERGENCY MEDICINE

## 2019-01-21 PROCEDURE — 96375 TX/PRO/DX INJ NEW DRUG ADDON: CPT

## 2019-01-21 PROCEDURE — 85025 COMPLETE CBC W/AUTO DIFF WBC: CPT | Performed by: EMERGENCY MEDICINE

## 2019-01-21 PROCEDURE — 81003 URINALYSIS AUTO W/O SCOPE: CPT | Performed by: EMERGENCY MEDICINE

## 2019-01-21 PROCEDURE — 96361 HYDRATE IV INFUSION ADD-ON: CPT

## 2019-01-21 PROCEDURE — 93005 ELECTROCARDIOGRAM TRACING: CPT

## 2019-01-21 PROCEDURE — 96366 THER/PROPH/DIAG IV INF ADDON: CPT

## 2019-01-21 PROCEDURE — 83735 ASSAY OF MAGNESIUM: CPT | Performed by: EMERGENCY MEDICINE

## 2019-01-21 PROCEDURE — 85610 PROTHROMBIN TIME: CPT | Performed by: EMERGENCY MEDICINE

## 2019-01-21 PROCEDURE — 93010 ELECTROCARDIOGRAM REPORT: CPT | Performed by: INTERNAL MEDICINE

## 2019-01-21 PROCEDURE — 82805 BLOOD GASES W/O2 SATURATION: CPT | Performed by: EMERGENCY MEDICINE

## 2019-01-21 PROCEDURE — 71046 X-RAY EXAM CHEST 2 VIEWS: CPT

## 2019-01-21 PROCEDURE — 84484 ASSAY OF TROPONIN QUANT: CPT | Performed by: EMERGENCY MEDICINE

## 2019-01-21 PROCEDURE — 36415 COLL VENOUS BLD VENIPUNCTURE: CPT | Performed by: EMERGENCY MEDICINE

## 2019-01-21 RX ORDER — OXYCODONE HYDROCHLORIDE 10 MG/1
10 TABLET ORAL EVERY 8 HOURS PRN
Qty: 17 TABLET | Refills: 0 | Status: SHIPPED | OUTPATIENT
Start: 2019-01-21 | End: 2019-06-13 | Stop reason: ALTCHOICE

## 2019-01-21 RX ORDER — ONDANSETRON 2 MG/ML
4 INJECTION INTRAMUSCULAR; INTRAVENOUS ONCE
Status: COMPLETED | OUTPATIENT
Start: 2019-01-21 | End: 2019-01-21

## 2019-01-21 RX ORDER — MORPHINE SULFATE 10 MG/ML
6 INJECTION, SOLUTION INTRAMUSCULAR; INTRAVENOUS ONCE
Status: COMPLETED | OUTPATIENT
Start: 2019-01-21 | End: 2019-01-21

## 2019-01-21 RX ORDER — SODIUM CHLORIDE, SODIUM LACTATE, POTASSIUM CHLORIDE, CALCIUM CHLORIDE 600; 310; 30; 20 MG/100ML; MG/100ML; MG/100ML; MG/100ML
100 INJECTION, SOLUTION INTRAVENOUS CONTINUOUS
Status: DISCONTINUED | OUTPATIENT
Start: 2019-01-21 | End: 2019-01-21 | Stop reason: HOSPADM

## 2019-01-21 RX ORDER — MAGNESIUM SULFATE HEPTAHYDRATE 40 MG/ML
2 INJECTION, SOLUTION INTRAVENOUS ONCE
Status: COMPLETED | OUTPATIENT
Start: 2019-01-21 | End: 2019-01-21

## 2019-01-21 RX ADMIN — SODIUM CHLORIDE, SODIUM LACTATE, POTASSIUM CHLORIDE, AND CALCIUM CHLORIDE 100 ML/HR: .6; .31; .03; .02 INJECTION, SOLUTION INTRAVENOUS at 09:17

## 2019-01-21 RX ADMIN — MAGNESIUM SULFATE HEPTAHYDRATE 2 G: 40 INJECTION, SOLUTION INTRAVENOUS at 08:57

## 2019-01-21 RX ADMIN — IOHEXOL 100 ML: 350 INJECTION, SOLUTION INTRAVENOUS at 08:58

## 2019-01-21 RX ADMIN — ONDANSETRON HYDROCHLORIDE 4 MG: 2 SOLUTION INTRAMUSCULAR; INTRAVENOUS at 07:42

## 2019-01-21 RX ADMIN — FAMOTIDINE 20 MG: 10 INJECTION, SOLUTION INTRAVENOUS at 08:41

## 2019-01-21 RX ADMIN — MORPHINE SULFATE 6 MG: 10 INJECTION INTRAVENOUS at 07:43

## 2019-01-21 RX ADMIN — SODIUM CHLORIDE 1000 ML: 0.9 INJECTION, SOLUTION INTRAVENOUS at 07:40

## 2019-01-21 NOTE — DISCHARGE INSTRUCTIONS
Acute Abdominal Pain   WHAT YOU NEED TO KNOW:   The cause of your abdominal pain may not be found  If a cause is found, treatment will depend on what the cause is  DISCHARGE INSTRUCTIONS:   Return to the emergency department if:   · You vomit blood or cannot stop vomiting  · You have blood in your bowel movement or it looks like tar  · You have bleeding from your rectum  · Your abdomen is larger than usual, more painful, and hard  · You have severe pain in your abdomen  · You stop passing gas and having bowel movements  · You feel weak, dizzy, or faint  Contact your healthcare provider if:   · You have a fever  · You have new signs and symptoms  · Your symptoms do not get better with treatment  · You have questions or concerns about your condition or care  Medicines  may be given to decrease pain, treat an infection, and manage your symptoms  Take your medicine as directed  Call your healthcare provider if you think your medicine is not helping or if you have side effects  Tell him if you are allergic to any medicine  Keep a list of the medicines, vitamins, and herbs you take  Include the amounts, and when and why you take them  Bring the list or the pill bottles to follow-up visits  Carry your medicine list with you in case of an emergency  Manage your symptoms:   · Apply heat  on your abdomen for 20 to 30 minutes every 2 hours for as many days as directed  Heat helps decrease pain and muscle spasms  · Manage your stress  Stress may cause abdominal pain  Your healthcare provider may recommend relaxation techniques and deep breathing exercises to help decrease your stress  Your healthcare provider may recommend you talk to someone about your stress or anxiety, such as a counselor or a trusted friend  Get plenty of sleep and exercise regularly  · Limit or do not drink alcohol  Alcohol can make your abdominal pain worse   Ask your healthcare provider if it is safe for you to drink alcohol  Also ask how much is safe for you to drink  · Do not smoke  Nicotine and other chemicals in cigarettes can damage your esophagus and stomach  Ask your healthcare provider for information if you currently smoke and need help to quit  E-cigarettes or smokeless tobacco still contain nicotine  Talk to your healthcare provider before you use these products  Make changes to the food you eat as directed:  Do not eat foods that cause abdominal pain or other symptoms  Eat small meals more often  · Eat more high-fiber foods if you are constipated  High-fiber foods include fruits, vegetables, whole-grain foods, and legumes  · Do not eat foods that cause gas if you have bloating  Examples include broccoli, cabbage, and cauliflower  Do not drink soda or carbonated drinks, because these may also cause gas  · Do not eat foods or drinks that contain sorbitol or fructose if you have diarrhea and bloating  Some examples are fruit juices, candy, jelly, and sugar-free gum  · Do not eat high-fat foods, such as fried foods, cheeseburgers, hot dogs, and desserts  · Limit or do not drink caffeine  Caffeine may make symptoms, such as heart burn or nausea, worse  · Drink plenty of liquids to prevent dehydration from diarrhea or vomiting  Ask your healthcare provider how much liquid to drink each day and which liquids are best for you  Follow up with your healthcare provider as directed:  Write down your questions so you remember to ask them during your visits  © 2017 [address] Information is for End User's use only and may not be sold, redistributed or otherwise used for commercial purposes  All illustrations and images included in CareNotes® are the copyrighted property of [employer]  or Honorio Stafford  The above information is an  only  It is not intended as medical advice for individual conditions or treatments   Talk to your doctor, nurse or pharmacist before following any medical regimen to see if it is safe and effective for you

## 2019-01-21 NOTE — ED NOTES
Complaining of epigastric pain   Dr Hernan Bennett made aware and new orders noted     Tad Doyle, MARIANNE  01/21/19 4313

## 2019-01-21 NOTE — ED PROVIDER NOTES
History  Chief Complaint   Patient presents with    Abdominal Pain     started with severe right sided abdominal pain last night into this am  etoh odor noted  History provided by:  Patient, medical records and EMS personnel  Abdominal Pain   Pain location:  RLQ, LLQ and RUQ  Pain quality: aching and cramping    Pain radiates to:  Does not radiate  Pain severity:  Moderate  Onset quality:  Sudden  Duration:  1 day  Timing:  Constant  Progression:  Worsening  Chronicity:  Recurrent (Same location/distribution as pain associated with perforated appendicitis in Dec 2018  Both on f/u CT and f/u office visit in Jan 2019 patient had resolution of abscess )  Context: previous surgery (Partial colon resection Oct 2016  Small bowel perforation with partial SBR/abd wall hernia repair Oct 2018  Perforated appendicitis with abscess Dec 2018 treated nonoperatively at [facility] with plan for delayed appendectomy in Feb 2019)    Context: not recent illness, not recent travel, not sick contacts and not trauma    Context comment:  Worsening pain yesterday evening while watching TV; pain has been ongoing this morning and is much more severe  Relieved by:  Nothing  Worsened by: Movement, palpation and position changes  Ineffective treatments: Has taken multiple doses of ibuprofen without improvement  Associated symptoms: nausea    Associated symptoms: no chest pain, no chills, no cough, no diarrhea, no dysuria, no fatigue, no fever, no shortness of breath, no sore throat and no vomiting    Risk factors: multiple surgeries (See notes above and below  Surgery Dr Nannette Romberg of [facility]) and NSAID use    Risk factors: no alcohol abuse and not obese      A/p:  Recent perforated appendicitis treated non operatively now with worsening symptoms in same distribution and diffusely tender abdomen    Differential diagnosis includes but not limited to worsening intra-abdominal abscess/enteritis/colitis/bowel perforation/small bowel obstruction/large bowel obstruction  Plan CT abdomen/pelvis with IV contrast to assess intra-abdominal infection  Symptomatic treatment while workup ongoing  Surgical hx per [facility] discharge summary:    [address] N/A 7/30/2014   Performed by Helio Méndez MD at [address] 10/19/2018   Performed by Cora Alva MD at [address] (ERCP) DIAGNOSTIC N/A 10/22/2018   Performed by Jamar Mullins MD at [address] N/A 10/19/2018   Performed by Cora Alva MD at [address] INFORMATION   LEFT shoulder surgery, carpal tunnel 2013    INFORMATION Bilateral   hernia repair    PARTIAL REMOVAL OF COLON 2016   perforated bowel    REPAIR INITIAL INCISIONAL /VENTRAL HERNIA REDUCIBLE N/A 10/19/2018   Performed by Cora Alva MD at [address]     Prior to Admission Medications   Prescriptions Last Dose Informant Patient Reported? Taking?    Multiple Vitamin (TAB-A-BONI PO)   Yes Yes   Sig: Take 1 tablet by mouth daily   acetaminophen (TYLENOL) 325 mg tablet   No Yes   Sig: Take 2 tablets every 6 hours as needed   albuterol (2 5 mg/3 mL) 0 083 % nebulizer solution   No No   Sig: Take 1 vial (2 5 mg total) by nebulization every 6 (six) hours as needed for wheezing or shortness of breath   albuterol (VENTOLIN HFA) 90 mcg/act inhaler   No No   Sig: Inhale 2 puffs every 4 (four) hours as needed for wheezing or shortness of breath   cholecalciferol (VITAMIN D3) 1,000 units tablet   No No   Sig: Take 1 tablet (1,000 Units total) by mouth daily   cyanocobalamin (VITAMIN B-12) 100 mcg tablet   No No   Sig: Take 1 tablet (100 mcg total) by mouth daily   folic acid (FOLVITE) 1 mg tablet   No Yes   Sig: Take 1 tablet (1 mg total) by mouth daily   gabapentin (NEURONTIN) 100 mg capsule   No Yes   Sig: Take 1 capsule (100 mg total) by mouth 3 (three) times a day levETIRAcetam (KEPPRA) 1000 MG tablet   No Yes   Sig: Take 1 tablet (1,000 mg total) by mouth every 12 (twelve) hours   lisinopril (ZESTRIL) 10 mg tablet   No Yes   Sig: Take 1 tablet (10 mg total) by mouth daily   magnesium oxide (MAG-OX) 400 mg   No Yes   Sig: Take 2 tablets (800 mg total) by mouth 3 (three) times a day   sodium chloride 1 g tablet   No Yes   Sig: Take 1 tablet (1 g total) by mouth 3 (three) times a day   thiamine 100 MG tablet   No Yes   Sig: Take 1 tablet (100 mg total) by mouth daily      Facility-Administered Medications: None       Past Medical History:   Diagnosis Date    Alcohol abuse     Bowel obstruction (HCC)     Bowel perforation (HCC)     Cardiac disease     Continuous chronic alcoholism ([facility] ) 10/5/2017    COPD (chronic obstructive pulmonary disease) ([facility] )     History of shoulder surgery     Right shoulder    History of transfusion     Hx of cervical spine surgery     Hypertension     Incisional hernia 10/8/2017    MI, old     Mitral regurgitation     Psychiatric disorder     Seizures ([facility] )        Past Surgical History:   Procedure Laterality Date    BACK SURGERY      ESOPHAGOGASTRODUODENOSCOPY N/A 11/28/2016    Procedure: ESOPHAGOGASTRODUODENOSCOPY (EGD); Surgeon: Jim Oglesby MD;  Location: [facility]; Service:     LAPAROTOMY N/A 10/25/2016    Procedure: LAPAROTOMY EXPLORATORY;  Surgeon: Katina Robertson MD;  Location: [facility];  Service:    [facility] Right     SMALL INTESTINE SURGERY         History reviewed  No pertinent family history  I have reviewed and agree with the history as documented  Social History   Substance Use Topics    Smoking status: Current Every Day Smoker     Packs/day: 2 00     Years: 15 00    Smokeless tobacco: Never Used    Alcohol use 3 6 oz/week     6 Cans of beer per week      Comment: 6 25oz cans a day        Review of Systems   Constitutional: Negative for chills, fatigue and fever     HENT: Negative for sore throat  Respiratory: Negative for cough and shortness of breath  Cardiovascular: Negative for chest pain and palpitations  Gastrointestinal: Positive for abdominal pain, blood in stool and nausea  Negative for diarrhea and vomiting  Genitourinary: Negative for difficulty urinating, dysuria and flank pain  Skin: Negative for color change, pallor, rash and wound  Hematological: Negative for adenopathy  Does not bruise/bleed easily  All other systems reviewed and are negative  Physical Exam  Physical Exam   Constitutional: He is oriented to person, place, and time  He appears well-developed and well-nourished  He is cooperative  No distress  HENT:   Head: Normocephalic and atraumatic  Right Ear: Hearing and external ear normal    Left Ear: Hearing and external ear normal    Nose: Nose normal    Mouth/Throat: Uvula is midline, oropharynx is clear and moist and mucous membranes are normal  No oropharyngeal exudate  Neck: Trachea normal, normal range of motion and phonation normal  Neck supple  No JVD present  No tracheal tenderness, no spinous process tenderness and no muscular tenderness present  No tracheal deviation present  No thyroid mass and no thyromegaly present  Cardiovascular: Normal rate, regular rhythm, S1 normal, S2 normal, normal heart sounds and intact distal pulses  Exam reveals no gallop and no friction rub  No murmur heard  Pulses:       Radial pulses are 2+ on the right side, and 2+ on the left side  Dorsalis pedis pulses are 2+ on the right side, and 2+ on the left side  Posterior tibial pulses are 2+ on the right side, and 2+ on the left side  Pulmonary/Chest: Effort normal and breath sounds normal  No stridor  No respiratory distress  He has no decreased breath sounds  He has no wheezes  He has no rhonchi  He has no rales  He exhibits no tenderness  Abdominal: Soft  He exhibits no distension and no mass   There is tenderness in the right upper quadrant, right lower quadrant and left lower quadrant  There is no rigidity, no rebound, no guarding and no CVA tenderness  Musculoskeletal: Normal range of motion  He exhibits no edema, tenderness or deformity  Lymphadenopathy:     He has no cervical adenopathy  Neurological: He is alert and oriented to person, place, and time  GCS eye subscore is 4  GCS verbal subscore is 5  GCS motor subscore is 6  Skin: Skin is warm, dry and intact  No rash noted  He is not diaphoretic  No erythema  Psychiatric: He has a normal mood and affect  His speech is normal and behavior is normal    Nursing note and vitals reviewed        Vital Signs  ED Triage Vitals   Temperature Pulse Respirations Blood Pressure SpO2   01/21/19 0718 01/21/19 0718 01/21/19 0718 01/21/19 0715 01/21/19 0718   97 6 °F (36 4 °C) 88 18 157/90 99 %      Temp Source Heart Rate Source Patient Position - Orthostatic VS BP Location FiO2 (%)   01/21/19 0718 01/21/19 0718 01/21/19 0718 01/21/19 0718 --   Temporal Monitor Lying Left arm       Pain Score       01/21/19 0718       Worst Possible Pain           Vitals:    01/21/19 0745 01/21/19 0845 01/21/19 1041 01/21/19 1130   BP: 132/89 141/87  142/84   Pulse: 84 88 96 99   Patient Position - Orthostatic VS: Lying Sitting  Sitting       Visual Acuity      ED Medications  Medications   lactated ringers infusion (0 mL/hr Intravenous Stopped 1/21/19 1154)   sodium chloride 0 9 % bolus 1,000 mL (0 mL Intravenous Stopped 1/21/19 0830)   ondansetron (ZOFRAN) injection 4 mg (4 mg Intravenous Given 1/21/19 0742)   morphine (PF) 10 mg/mL injection 6 mg (6 mg Intravenous Given 1/21/19 0743)   famotidine (PEPCID) injection 20 mg (20 mg Intravenous Given 1/21/19 0841)   magnesium sulfate 2 g/50 mL IVPB (premix) 2 g (0 g Intravenous Stopped 1/21/19 1119)   iohexol (OMNIPAQUE) 350 MG/ML injection (SINGLE-DOSE) 100 mL (100 mL Intravenous Given 1/21/19 0858)       Diagnostic Studies  Results Reviewed Procedure Component Value Units Date/Time    Troponin I [[id number]]  (Normal) Collected:  01/21/19 0806    Lab Status:  Final result Specimen:  Blood from Arm, Left Updated:  01/21/19 0844     Troponin I <0 02 ng/mL     Magnesium [[id number]]  (Abnormal) Collected:  01/21/19 0732    Lab Status:  Final result Specimen:  Blood from Arm, Left Updated:  01/21/19 0840     Magnesium 1 5 (L) mg/dL     Lipase [[id number]]  (Normal) Collected:  01/21/19 0732    Lab Status:  Final result Specimen:  Blood from Arm, Left Updated:  01/21/19 0840     Lipase 268 u/L     CMP [[id number]]  (Abnormal) Collected:  01/21/19 0732    Lab Status:  Final result Specimen:  Blood from Arm, Left Updated:  01/21/19 0840     Sodium 134 (L) mmol/L      Potassium 3 8 mmol/L      Chloride 95 (L) mmol/L      CO2 28 mmol/L      ANION GAP 11 mmol/L      BUN 6 mg/dL      Creatinine 0 69 mg/dL      Glucose 93 mg/dL      Calcium 9 0 mg/dL      AST 32 U/L      ALT 18 U/L      Alkaline Phosphatase 176 (H) U/L      Total Protein 8 4 (H) g/dL      Albumin 3 8 g/dL      Total Bilirubin 0 25 mg/dL      eGFR 109 ml/min/1 73sq m     Narrative:         National Kidney Disease Education Program recommendations are as follows:  GFR calculation is accurate only with a steady state creatinine  Chronic Kidney disease less than 60 ml/min/1 73 sq  meters  Kidney failure less than 15 ml/min/1 73 sq  meters      UA w Reflex to Microscopic w Reflex to Culture [[id number]] Collected:  01/21/19 0758    Lab Status:  Final result Specimen:  Urine from Urine, Clean Catch Updated:  01/21/19 0811     Color, UA Yellow     Clarity, UA Clear     Specific Gravity, UA <=1 005     pH, UA 7 0     Leukocytes, UA Negative     Nitrite, UA Negative     Protein, UA Negative mg/dl      Glucose, UA Negative mg/dl      Ketones, UA Negative mg/dl      Urobilinogen, UA 0 2 E U /dl      Bilirubin, UA Negative     Blood, UA Negative    Protime-INR [[id number]]  (Normal) Collected:  01/21/19 0732    Lab Status:  Final result Specimen:  Blood from Arm, Left Updated:  01/21/19 0751     Protime 13 3 seconds      INR 1 06    Ethanol [[id number]]  (Abnormal) Collected:  01/21/19 0732    Lab Status:  Final result Specimen:  Blood from Arm, Left Updated:  01/21/19 0748     Ethanol Lvl 140 (H) mg/dL     Blood gas, venous [[id number]]  (Abnormal) Collected:  01/21/19 0732    Lab Status:  Final result Specimen:  Blood from Arm, Left Updated:  01/21/19 0741     pH, Shalom 7 403 (H)     pCO2, Shalom 43 6 mm Hg      pO2, Shalom 28 1 (L) mm Hg      HCO3, Shalom 26 6 mmol/L      Base Excess, Shalom 1 5 mmol/L      O2 Content, Shalom 11 8 ml/dL      O2 HGB, VENOUS 55 0 (L) %     CBC and differential [[id number]]  (Abnormal) Collected:  01/21/19 0732    Lab Status:  Final result Specimen:  Blood from Arm, Left Updated:  01/21/19 0739     WBC 6 99 Thousand/uL      RBC 4 77 Million/uL      Hemoglobin 13 5 g/dL      Hematocrit 41 8 %      MCV 88 fL      MCH 28 3 pg      MCHC 32 3 g/dL      RDW 16 4 (H) %      MPV 8 9 fL      Platelets 889 (L) Thousands/uL      nRBC 0 /100 WBCs      Neutrophils Relative 34 (L) %      Immat GRANS % 0 %      Lymphocytes Relative 48 (H) %      Monocytes Relative 12 %      Eosinophils Relative 5 %      Basophils Relative 1 %      Neutrophils Absolute 2 39 Thousands/µL      Immature Grans Absolute 0 02 Thousand/uL      Lymphocytes Absolute 3 31 Thousands/µL      Monocytes Absolute 0 87 Thousand/µL      Eosinophils Absolute 0 35 Thousand/µL      Basophils Absolute 0 05 Thousands/µL                  XR chest 2 views   Final Result by Christiano Sandoval MD (01/21 0935)      No acute cardiopulmonary disease  Workstation performed: [id number]         CT abdomen pelvis with contrast   Final Result by Christiano Sandoval MD (01/21 0930)      No definite residual abscess collection identified in the right lower quadrant    Overall sensitivity moderately limited due to lack of oral contrast to aid in differentiating loops of small bowel within the area  Workstation performed: [id number]                    Procedures  ECG 12 Lead Documentation  Date/Time: 1/21/2019 8:08 AM  Performed by: Virgie Thakkar  Authorized by: Virgie Thakkar     Indications / Diagnosis:  Chest pain  ECG reviewed by me, the ED Provider: yes    Patient location:  ED  Previous ECG:     Previous ECG:  Compared to current    Comparison ECG info:  5 Oct 2017    Similarity:  No change    Comparison to cardiac monitor: Yes    Interpretation:     Interpretation: abnormal    Rate:     ECG rate:  85    ECG rate assessment: normal    Rhythm:     Rhythm: sinus rhythm    Ectopy:     Ectopy: none    QRS:     QRS axis:  Left    QRS intervals:  Normal  Conduction:     Conduction: abnormal      Abnormal conduction: LAFB    ST segments:     ST segments:  Normal  T waves:     T waves: normal    Comments:      Pr 152 qrs 92 qtc 483           Phone Contacts  ED Phone Contact    ED Course  ED Course as of Jan 21 1320   Mon Jan 21, 2019   0804 Patient c/o mild pressure-like pain at superior margin of abdominal scar--this pain became more prominent since administration of morphine causing improvement of lower quadrant abdominal pain, although epigastric pain is improving in turn as well  Epigastric pain is non-radiating and not a/w dyspnea/n/v; denies previous hx of similar sx  EKG obtained demonstrating no changes from prior; troponin obtained also  Will administered famotidine IV; pending CT a/p now  0845 1  WBC wnl   2  Hg/Hct wnl   3  Plt mildly thrombocytopenic; this is a new finding from previous values  4  Electrolytes: Hypomagnesemia; will replete IV  Mild hyponatremia with hypochloremia  5  UA wnl  Dilute sample  6  Lipase wnl   7  Troponin negative  8  INR wnl   9  EtOH elevated; patient states that he had been consuming alcohol yesterday evening and today also  10  VBG: Essentially normal pH (very minimal respiratory alkalosis) with hypoxemia     11  Mild elevation in alk phos; otherwise wnl      0909 CT completed and awaiting interpretation  1486 CT a/p results noted and reviewed: no acute intraabdominal abnormality identified (including recurrent abscess) or other distinct abnormalities  Will d/w [facility] surgery regarding management--transfer order placed in [facility] [address]     8294 Received call from Dr Deena Calloway of [facility] is a physician from the hospitalist group and not a surgeon  PAC to arrange contact with Dr Maurizio Gonzalez or other physician from surgical group  1010 Received call from Dr Maurizio Gonzalez  Discussed patient case: reviewed labs/ct images and sx  I do not see an indication for emergent surgical transfer and he agreed with this  Patient can be discharged with oral analgesia and prompt outpatient f/u as previously scheduled  1133 I was called away to see a critical patient who required my continuous attention for one hour and was accordingly unable to return to the patient until now  I reviewed workup results with patient  Patient will need further follow-up with his surgeon as the current sx may require adjustment of his surgical date (although as noted no indication for emergent intervention now); ED return for abdominal distension/GI bleeding/fever  All questions answered prior to discharge  The patient expressed understanding and agreed to plan  [place] PDMP queried prior to Rx  Based on review of records, there is no evidence of controlled substance abuse or repeated inappropriate ED visits to obtain controlled substances  It is therefore reasonable to prescribe a short course of opiate-based analgesic for symptom control with close f/u to PMD also advised  Precautions given to avoid use of opiate medications while driving and to abstain from alcohol while using               MDM  Number of Diagnoses or Management Options  Alcohol use: new and does not require workup  Hypomagnesemia: new and does not require workup  Right lower quadrant abdominal pain: established and worsening     Amount and/or Complexity of Data Reviewed  Clinical lab tests: reviewed and ordered  Tests in the radiology section of CPT®: reviewed and ordered  Decide to obtain previous medical records or to obtain history from someone other than the patient: yes  Obtain history from someone other than the patient: yes  Review and summarize past medical records: yes  Discuss the patient with other providers: yes  Independent visualization of images, tracings, or specimens: yes    Risk of Complications, Morbidity, and/or Mortality  Presenting problems: high  Diagnostic procedures: high  Management options: high    Patient Progress  Patient progress: improved    CritCare Time    Disposition  Final diagnoses:   Right lower quadrant abdominal pain   Hypomagnesemia   Alcohol use     Time reflects when diagnosis was documented in both MDM as applicable and the Disposition within this note     Time User Action Codes Description Comment    1/21/2019  9:35 AM Murray Kitchen Add [R10 31] Right lower quadrant abdominal pain     1/21/2019  9:35 AM Murray Splashchen Add [E83 42] Hypomagnesemia     1/21/2019  9:35 AM [email or web address] Add [Z78 9] Alcohol use       ED Disposition     ED Disposition Condition Comment    Discharge  Yeimi Romero discharge to home/self care      Condition at discharge: Stable        Follow-up Information     Follow up With Specialties Details Why Contact Info Additional Blake Denney MD General Surgery Schedule an appointment as soon as possible for a visit in 1 week To discuss your ER visit and to determine if you need to change your surgery date Eddie Cancino [address] Emergency Department Emergency Medicine Go to If symptoms worsen: if you develop bleeding in your stool (either black/tarry stool or large amounts of clotted blood), abdominal swelling, or fever [address]  [phone] [facility], [address], [place], [place], [address]          Discharge Medication List as of 1/21/2019 11:37 AM      START taking these medications    Details   oxyCODONE (ROXICODONE) 10 MG TABS Take 1 tablet (10 mg total) by mouth every 8 (eight) hours as needed for moderate pain (Do not drive or drink alcohol while using ), Starting Mon 1/21/2019, Print         CONTINUE these medications which have NOT CHANGED    Details   acetaminophen (TYLENOL) 325 mg tablet Take 2 tablets every 6 hours as needed, Print      folic acid (FOLVITE) 1 mg tablet Take 1 tablet (1 mg total) by mouth daily, Starting Tue 7/17/2018, Print      gabapentin (NEURONTIN) 100 mg capsule Take 1 capsule (100 mg total) by mouth 3 (three) times a day, Starting Tue 7/17/2018, Print      levETIRAcetam (KEPPRA) 1000 MG tablet Take 1 tablet (1,000 mg total) by mouth every 12 (twelve) hours, Starting Tue 7/17/2018, Normal      lisinopril (ZESTRIL) 10 mg tablet Take 1 tablet (10 mg total) by mouth daily, Starting Tue 7/17/2018, Normal      magnesium oxide (MAG-OX) 400 mg Take 2 tablets (800 mg total) by mouth 3 (three) times a day, Starting Mon 7/30/2018, No Print      Multiple Vitamin (TAB-A-BONI PO) Take 1 tablet by mouth daily, Historical Med      sodium chloride 1 g tablet Take 1 tablet (1 g total) by mouth 3 (three) times a day, Starting Tue 7/17/2018, Normal      thiamine 100 MG tablet Take 1 tablet (100 mg total) by mouth daily, Starting Tue 7/17/2018, Print      albuterol (2 5 mg/3 mL) 0 083 % nebulizer solution Take 1 vial (2 5 mg total) by nebulization every 6 (six) hours as needed for wheezing or shortness of breath, Starting Tue 7/17/2018, Normal      albuterol (VENTOLIN HFA) 90 mcg/act inhaler Inhale 2 puffs every 4 (four) hours as needed for wheezing or shortness of breath, Starting Thu 1/17/2019, Normal      cholecalciferol (VITAMIN D3) 1,000 units tablet Take 1 tablet (1,000 Units total) by mouth daily, Starting Tue 7/17/2018, Normal      cyanocobalamin (VITAMIN B-12) 100 mcg tablet Take 1 tablet (100 mcg total) by mouth daily, Starting Tue 7/17/2018, Normal           No discharge procedures on file      ED Provider  Electronically Signed by           Melquiades Billings DO  01/21/19 2255

## 2019-04-10 DIAGNOSIS — M54.9 DORSALGIA: ICD-10-CM

## 2019-04-10 RX ORDER — GABAPENTIN 100 MG/1
CAPSULE ORAL
Qty: 90 CAPSULE | Refills: 5 | Status: SHIPPED | OUTPATIENT
Start: 2019-04-10 | End: 2019-11-04 | Stop reason: SDUPTHER

## 2019-05-08 DIAGNOSIS — M50.10 CERVICAL DISC SYNDROME: ICD-10-CM

## 2019-05-08 RX ORDER — LEVETIRACETAM 1000 MG/1
TABLET ORAL
Qty: 60 TABLET | Refills: 5 | Status: SHIPPED | OUTPATIENT
Start: 2019-05-08 | End: 2019-10-30 | Stop reason: SDUPTHER

## 2019-06-13 ENCOUNTER — OFFICE VISIT (OUTPATIENT)
Dept: FAMILY MEDICINE CLINIC | Facility: CLINIC | Age: 54
End: 2019-06-13
Payer: COMMERCIAL

## 2019-06-13 VITALS
HEIGHT: 65 IN | SYSTOLIC BLOOD PRESSURE: 134 MMHG | HEART RATE: 96 BPM | TEMPERATURE: 97.6 F | WEIGHT: 134 LBS | BODY MASS INDEX: 22.33 KG/M2 | DIASTOLIC BLOOD PRESSURE: 84 MMHG | RESPIRATION RATE: 18 BRPM | OXYGEN SATURATION: 97 %

## 2019-06-13 DIAGNOSIS — G40.919 SEIZURE DISORDER, INTRACTABLE (HCC): Primary | ICD-10-CM

## 2019-06-13 DIAGNOSIS — I10 ESSENTIAL HYPERTENSION: ICD-10-CM

## 2019-06-13 DIAGNOSIS — G47.00 INSOMNIA, UNSPECIFIED TYPE: ICD-10-CM

## 2019-06-13 PROCEDURE — T1015 CLINIC SERVICE: HCPCS | Performed by: FAMILY MEDICINE

## 2019-06-13 RX ORDER — AMITRIPTYLINE HYDROCHLORIDE 25 MG/1
25 TABLET, FILM COATED ORAL
Qty: 30 TABLET | Refills: 3 | Status: SHIPPED | OUTPATIENT
Start: 2019-06-13 | End: 2022-05-26

## 2019-06-15 ENCOUNTER — HOSPITAL ENCOUNTER (EMERGENCY)
Facility: HOSPITAL | Age: 54
Discharge: HOME | End: 2019-06-15
Attending: EMERGENCY MEDICINE | Admitting: EMERGENCY MEDICINE
Payer: COMMERCIAL

## 2019-06-15 ENCOUNTER — APPOINTMENT (EMERGENCY)
Dept: RADIOLOGY | Facility: HOSPITAL | Age: 54
End: 2019-06-15
Payer: COMMERCIAL

## 2019-06-15 VITALS
HEART RATE: 73 BPM | TEMPERATURE: 98 F | DIASTOLIC BLOOD PRESSURE: 89 MMHG | WEIGHT: 137.13 LBS | SYSTOLIC BLOOD PRESSURE: 130 MMHG | HEIGHT: 66 IN | BODY MASS INDEX: 22.04 KG/M2 | RESPIRATION RATE: 18 BRPM | OXYGEN SATURATION: 96 %

## 2019-06-15 DIAGNOSIS — S52.022A OLECRANON FRACTURE, LEFT, CLOSED, INITIAL ENCOUNTER: Primary | ICD-10-CM

## 2019-06-15 PROCEDURE — 29105 APPLICATION LONG ARM SPLINT: CPT | Performed by: PHYSICIAN ASSISTANT

## 2019-06-15 PROCEDURE — 99283 EMERGENCY DEPT VISIT LOW MDM: CPT

## 2019-06-15 PROCEDURE — 99284 EMERGENCY DEPT VISIT MOD MDM: CPT | Performed by: PHYSICIAN ASSISTANT

## 2019-06-15 PROCEDURE — 73080 X-RAY EXAM OF ELBOW: CPT

## 2019-06-15 RX ORDER — OXYCODONE HYDROCHLORIDE AND ACETAMINOPHEN 5; 325 MG/1; MG/1
1 TABLET ORAL ONCE
Status: COMPLETED | OUTPATIENT
Start: 2019-06-15 | End: 2019-06-15

## 2019-06-15 RX ORDER — OXYCODONE HYDROCHLORIDE AND ACETAMINOPHEN 5; 325 MG/1; MG/1
1 TABLET ORAL EVERY 4 HOURS PRN
Qty: 10 TABLET | Refills: 0 | Status: SHIPPED | OUTPATIENT
Start: 2019-06-15 | End: 2019-06-25

## 2019-06-15 RX ORDER — NAPROXEN 500 MG/1
500 TABLET ORAL 2 TIMES DAILY WITH MEALS
Qty: 30 TABLET | Refills: 0 | Status: SHIPPED | OUTPATIENT
Start: 2019-06-15 | End: 2020-01-22

## 2019-06-15 RX ADMIN — OXYCODONE HYDROCHLORIDE AND ACETAMINOPHEN 1 TABLET: 5; 325 TABLET ORAL at 10:07

## 2019-06-18 ENCOUNTER — CONSULT (OUTPATIENT)
Dept: NEUROLOGY | Facility: CLINIC | Age: 54
End: 2019-06-18
Payer: COMMERCIAL

## 2019-06-18 VITALS
BODY MASS INDEX: 21.63 KG/M2 | RESPIRATION RATE: 16 BRPM | HEART RATE: 97 BPM | HEIGHT: 67 IN | DIASTOLIC BLOOD PRESSURE: 80 MMHG | SYSTOLIC BLOOD PRESSURE: 130 MMHG | WEIGHT: 137.8 LBS

## 2019-06-18 DIAGNOSIS — D69.6 THROMBOCYTOPENIA (HCC): ICD-10-CM

## 2019-06-18 DIAGNOSIS — G40.919 SEIZURE DISORDER, INTRACTABLE (HCC): ICD-10-CM

## 2019-06-18 DIAGNOSIS — G40.909 SEIZURE DISORDER (HCC): Primary | ICD-10-CM

## 2019-06-18 PROCEDURE — 99214 OFFICE O/P EST MOD 30 MIN: CPT | Performed by: PSYCHIATRY & NEUROLOGY

## 2019-06-20 ENCOUNTER — TELEPHONE (OUTPATIENT)
Dept: OBGYN CLINIC | Facility: HOSPITAL | Age: 54
End: 2019-06-20

## 2019-06-24 PROBLEM — S52.022A CLOSED FRACTURE OF LEFT OLECRANON PROCESS, INITIAL ENCOUNTER: Status: ACTIVE | Noted: 2019-06-24

## 2019-06-25 ENCOUNTER — APPOINTMENT (OUTPATIENT)
Dept: RADIOLOGY | Facility: MEDICAL CENTER | Age: 54
End: 2019-06-25
Payer: COMMERCIAL

## 2019-06-25 ENCOUNTER — OFFICE VISIT (OUTPATIENT)
Dept: OBGYN CLINIC | Facility: CLINIC | Age: 54
End: 2019-06-25
Payer: COMMERCIAL

## 2019-06-25 VITALS
HEART RATE: 87 BPM | HEIGHT: 67 IN | BODY MASS INDEX: 21.85 KG/M2 | DIASTOLIC BLOOD PRESSURE: 77 MMHG | WEIGHT: 139.2 LBS | SYSTOLIC BLOOD PRESSURE: 128 MMHG

## 2019-06-25 DIAGNOSIS — S52.022A CLOSED FRACTURE OF LEFT OLECRANON PROCESS, INITIAL ENCOUNTER: Primary | ICD-10-CM

## 2019-06-25 DIAGNOSIS — S52.022A CLOSED FRACTURE OF LEFT OLECRANON PROCESS, INITIAL ENCOUNTER: ICD-10-CM

## 2019-06-25 DIAGNOSIS — M25.532 PAIN IN LEFT WRIST: ICD-10-CM

## 2019-06-25 PROCEDURE — 99203 OFFICE O/P NEW LOW 30 MIN: CPT | Performed by: ORTHOPAEDIC SURGERY

## 2019-06-25 PROCEDURE — 73080 X-RAY EXAM OF ELBOW: CPT

## 2019-06-25 PROCEDURE — 73110 X-RAY EXAM OF WRIST: CPT

## 2019-06-27 ENCOUNTER — HOSPITAL ENCOUNTER (OUTPATIENT)
Dept: NEUROLOGY | Facility: HOSPITAL | Age: 54
Discharge: HOME | End: 2019-06-27
Payer: COMMERCIAL

## 2019-06-27 DIAGNOSIS — G40.909 SEIZURE DISORDER (HCC): ICD-10-CM

## 2019-06-27 PROCEDURE — 95819 EEG AWAKE AND ASLEEP: CPT | Performed by: PSYCHIATRY & NEUROLOGY

## 2019-06-27 PROCEDURE — 95819 EEG AWAKE AND ASLEEP: CPT

## 2019-07-02 ENCOUNTER — HOSPITAL ENCOUNTER (OUTPATIENT)
Dept: MRI IMAGING | Facility: HOSPITAL | Age: 54
Discharge: HOME | End: 2019-07-02
Payer: COMMERCIAL

## 2019-07-02 DIAGNOSIS — G40.909 SEIZURE DISORDER (HCC): ICD-10-CM

## 2019-07-02 PROCEDURE — 70551 MRI BRAIN STEM W/O DYE: CPT

## 2019-07-03 ENCOUNTER — TELEPHONE (OUTPATIENT)
Dept: FAMILY MEDICINE CLINIC | Facility: CLINIC | Age: 54
End: 2019-07-03

## 2019-09-13 ENCOUNTER — HOSPITAL ENCOUNTER (EMERGENCY)
Facility: HOSPITAL | Age: 54
End: 2019-09-13
Attending: EMERGENCY MEDICINE | Admitting: EMERGENCY MEDICINE
Payer: COMMERCIAL

## 2019-09-13 ENCOUNTER — HOSPITAL ENCOUNTER (INPATIENT)
Facility: HOSPITAL | Age: 54
LOS: 4 days | Discharge: HOME | DRG: 663 | End: 2019-09-17
Attending: FAMILY MEDICINE | Admitting: INTERNAL MEDICINE
Payer: COMMERCIAL

## 2019-09-13 ENCOUNTER — APPOINTMENT (EMERGENCY)
Dept: CT IMAGING | Facility: HOSPITAL | Age: 54
End: 2019-09-13
Payer: COMMERCIAL

## 2019-09-13 ENCOUNTER — APPOINTMENT (EMERGENCY)
Dept: RADIOLOGY | Facility: HOSPITAL | Age: 54
End: 2019-09-13
Payer: COMMERCIAL

## 2019-09-13 VITALS
DIASTOLIC BLOOD PRESSURE: 79 MMHG | WEIGHT: 137.35 LBS | TEMPERATURE: 96.9 F | RESPIRATION RATE: 69 BRPM | HEIGHT: 67 IN | BODY MASS INDEX: 21.56 KG/M2 | HEART RATE: 78 BPM | OXYGEN SATURATION: 100 % | SYSTOLIC BLOOD PRESSURE: 128 MMHG

## 2019-09-13 DIAGNOSIS — F10.929 ALCOHOL INTOXICATION (HCC): ICD-10-CM

## 2019-09-13 DIAGNOSIS — J18.9 PNEUMONIA: Primary | ICD-10-CM

## 2019-09-13 DIAGNOSIS — D50.9 MICROCYTIC ANEMIA: ICD-10-CM

## 2019-09-13 DIAGNOSIS — D64.9 ANEMIA: ICD-10-CM

## 2019-09-13 DIAGNOSIS — E61.1 IRON DEFICIENCY: ICD-10-CM

## 2019-09-13 DIAGNOSIS — K29.70 GASTRITIS: ICD-10-CM

## 2019-09-13 DIAGNOSIS — D64.9 ANEMIA, UNSPECIFIED TYPE: Primary | ICD-10-CM

## 2019-09-13 PROBLEM — J69.0 ASPIRATION PNEUMONIA (HCC): Status: ACTIVE | Noted: 2019-09-13

## 2019-09-13 LAB
ALBUMIN SERPL BCP-MCNC: 3.8 G/DL (ref 3.5–5)
ALP SERPL-CCNC: 184 U/L (ref 46–116)
ALT SERPL W P-5'-P-CCNC: 25 U/L (ref 12–78)
AMMONIA PLAS-SCNC: <10 UMOL/L (ref 11–35)
AMPHETAMINES SERPL QL SCN: NEGATIVE
ANION GAP SERPL CALCULATED.3IONS-SCNC: 10 MMOL/L (ref 4–13)
ANISOCYTOSIS BLD QL SMEAR: PRESENT
APAP SERPL-MCNC: <2 UG/ML (ref 10–20)
ARTERIAL PATENCY WRIST A: YES
AST SERPL W P-5'-P-CCNC: 57 U/L (ref 5–45)
BARBITURATES UR QL: NEGATIVE
BASE EXCESS BLDA CALC-SCNC: 1.9 MMOL/L
BASOPHILS # BLD MANUAL: 0 THOUSAND/UL (ref 0–0.1)
BASOPHILS NFR MAR MANUAL: 0 % (ref 0–1)
BENZODIAZ UR QL: NEGATIVE
BILIRUB SERPL-MCNC: 0.3 MG/DL (ref 0.2–1)
BILIRUB UR QL STRIP: NEGATIVE
BUN SERPL-MCNC: 7 MG/DL (ref 5–25)
CALCIUM SERPL-MCNC: 8.4 MG/DL (ref 8.3–10.1)
CHLORIDE SERPL-SCNC: 91 MMOL/L (ref 100–108)
CLARITY UR: CLEAR
CO2 SERPL-SCNC: 29 MMOL/L (ref 21–32)
COCAINE UR QL: NEGATIVE
COLOR UR: NORMAL
CREAT SERPL-MCNC: 0.6 MG/DL (ref 0.6–1.3)
EOSINOPHIL # BLD MANUAL: 0.11 THOUSAND/UL (ref 0–0.4)
EOSINOPHIL NFR BLD MANUAL: 2 % (ref 0–6)
ERYTHROCYTE [DISTWIDTH] IN BLOOD BY AUTOMATED COUNT: 21.2 % (ref 11.6–15.1)
ETHANOL SERPL-MCNC: 401 MG/DL (ref 0–3)
GFR SERPL CREATININE-BSD FRML MDRD: 115 ML/MIN/1.73SQ M
GLUCOSE SERPL-MCNC: 112 MG/DL (ref 65–140)
GLUCOSE UR STRIP-MCNC: NEGATIVE MG/DL
HCO3 BLDA-SCNC: 26.8 MMOL/L (ref 22–28)
HCT VFR BLD AUTO: 27.8 % (ref 36.5–49.3)
HGB BLD-MCNC: 8.1 G/DL (ref 12–17)
HGB UR QL STRIP.AUTO: NEGATIVE
HOLD SPECIMEN: NORMAL
HYPERCHROMIA BLD QL SMEAR: PRESENT
KETONES UR STRIP-MCNC: NEGATIVE MG/DL
LEUKOCYTE ESTERASE UR QL STRIP: NEGATIVE
LYMPHOCYTES # BLD AUTO: 1.29 THOUSAND/UL (ref 0.6–4.47)
LYMPHOCYTES # BLD AUTO: 23 % (ref 14–44)
MAGNESIUM SERPL-MCNC: 1.3 MG/DL (ref 1.6–2.6)
MCH RBC QN AUTO: 20.8 PG (ref 26.8–34.3)
MCHC RBC AUTO-ENTMCNC: 29.1 G/DL (ref 31.4–37.4)
MCV RBC AUTO: 72 FL (ref 82–98)
METHADONE UR QL: NEGATIVE
MICROCYTES BLD QL AUTO: PRESENT
MONOCYTES # BLD AUTO: 0.62 THOUSAND/UL (ref 0–1.22)
MONOCYTES NFR BLD: 11 % (ref 4–12)
NEUTROPHILS # BLD MANUAL: 3.36 THOUSAND/UL (ref 1.85–7.62)
NEUTS SEG NFR BLD AUTO: 60 % (ref 43–75)
NITRITE UR QL STRIP: NEGATIVE
NON VENT ROOM AIR: 21 %
NRBC BLD AUTO-RTO: 0 /100 WBCS
O2 CT BLDA-SCNC: 10.6 ML/DL (ref 16–23)
OPIATES UR QL SCN: NEGATIVE
OVALOCYTES BLD QL SMEAR: PRESENT
OXYHGB MFR BLDA: 90.3 % (ref 94–97)
PCO2 BLDA: 43.5 MM HG (ref 36–44)
PCP UR QL: NEGATIVE
PH BLDA: 7.41 [PH] (ref 7.35–7.45)
PH UR STRIP.AUTO: 6.5 [PH]
PLATELET # BLD AUTO: 278 THOUSANDS/UL (ref 149–390)
PLATELET BLD QL SMEAR: ADEQUATE
PMV BLD AUTO: 8.1 FL (ref 8.9–12.7)
PO2 BLDA: 73.6 MM HG (ref 75–129)
POIKILOCYTOSIS BLD QL SMEAR: PRESENT
POLYCHROMASIA BLD QL SMEAR: PRESENT
POTASSIUM SERPL-SCNC: 2.9 MMOL/L (ref 3.5–5.3)
PROT SERPL-MCNC: 8.3 G/DL (ref 6.4–8.2)
PROT UR STRIP-MCNC: NEGATIVE MG/DL
RBC # BLD AUTO: 3.89 MILLION/UL (ref 3.88–5.62)
SALICYLATES SERPL-MCNC: 3.1 MG/DL (ref 3–20)
SODIUM SERPL-SCNC: 130 MMOL/L (ref 136–145)
SP GR UR STRIP.AUTO: <=1.005 (ref 1–1.03)
SPECIMEN SOURCE: ABNORMAL
THC UR QL: NEGATIVE
TOTAL CELLS COUNTED SPEC: 100
TSH SERPL DL<=0.05 MIU/L-ACNC: 1.94 UIU/ML (ref 0.36–3.74)
UROBILINOGEN UR QL STRIP.AUTO: 0.2 E.U./DL
VARIANT LYMPHS # BLD AUTO: 4 %
WBC # BLD AUTO: 5.6 THOUSAND/UL (ref 4.31–10.16)

## 2019-09-13 PROCEDURE — 85007 BL SMEAR W/DIFF WBC COUNT: CPT | Performed by: EMERGENCY MEDICINE

## 2019-09-13 PROCEDURE — 82140 ASSAY OF AMMONIA: CPT | Performed by: EMERGENCY MEDICINE

## 2019-09-13 PROCEDURE — 36600 WITHDRAWAL OF ARTERIAL BLOOD: CPT

## 2019-09-13 PROCEDURE — 94760 N-INVAS EAR/PLS OXIMETRY 1: CPT

## 2019-09-13 PROCEDURE — 99223 1ST HOSP IP/OBS HIGH 75: CPT | Performed by: PHYSICIAN ASSISTANT

## 2019-09-13 PROCEDURE — 93005 ELECTROCARDIOGRAM TRACING: CPT

## 2019-09-13 PROCEDURE — 80053 COMPREHEN METABOLIC PANEL: CPT | Performed by: EMERGENCY MEDICINE

## 2019-09-13 PROCEDURE — 85027 COMPLETE CBC AUTOMATED: CPT | Performed by: EMERGENCY MEDICINE

## 2019-09-13 PROCEDURE — 94664 DEMO&/EVAL PT USE INHALER: CPT

## 2019-09-13 PROCEDURE — 83735 ASSAY OF MAGNESIUM: CPT | Performed by: EMERGENCY MEDICINE

## 2019-09-13 PROCEDURE — 99285 EMERGENCY DEPT VISIT HI MDM: CPT

## 2019-09-13 PROCEDURE — 82805 BLOOD GASES W/O2 SATURATION: CPT | Performed by: EMERGENCY MEDICINE

## 2019-09-13 PROCEDURE — 36415 COLL VENOUS BLD VENIPUNCTURE: CPT | Performed by: EMERGENCY MEDICINE

## 2019-09-13 PROCEDURE — 96368 THER/DIAG CONCURRENT INF: CPT

## 2019-09-13 PROCEDURE — 81003 URINALYSIS AUTO W/O SCOPE: CPT | Performed by: EMERGENCY MEDICINE

## 2019-09-13 PROCEDURE — 96365 THER/PROPH/DIAG IV INF INIT: CPT

## 2019-09-13 PROCEDURE — 80320 DRUG SCREEN QUANTALCOHOLS: CPT | Performed by: EMERGENCY MEDICINE

## 2019-09-13 PROCEDURE — 71046 X-RAY EXAM CHEST 2 VIEWS: CPT

## 2019-09-13 PROCEDURE — 99285 EMERGENCY DEPT VISIT HI MDM: CPT | Performed by: EMERGENCY MEDICINE

## 2019-09-13 PROCEDURE — 70450 CT HEAD/BRAIN W/O DYE: CPT

## 2019-09-13 PROCEDURE — 84443 ASSAY THYROID STIM HORMONE: CPT | Performed by: EMERGENCY MEDICINE

## 2019-09-13 PROCEDURE — 96367 TX/PROPH/DG ADDL SEQ IV INF: CPT

## 2019-09-13 PROCEDURE — 80329 ANALGESICS NON-OPIOID 1 OR 2: CPT | Performed by: EMERGENCY MEDICINE

## 2019-09-13 PROCEDURE — 96366 THER/PROPH/DIAG IV INF ADDON: CPT

## 2019-09-13 PROCEDURE — 80307 DRUG TEST PRSMV CHEM ANLYZR: CPT | Performed by: EMERGENCY MEDICINE

## 2019-09-13 PROCEDURE — 87040 BLOOD CULTURE FOR BACTERIA: CPT | Performed by: EMERGENCY MEDICINE

## 2019-09-13 RX ORDER — HYDROXYZINE HYDROCHLORIDE 25 MG/1
25 TABLET, FILM COATED ORAL EVERY 6 HOURS PRN
Status: DISCONTINUED | OUTPATIENT
Start: 2019-09-13 | End: 2019-09-17 | Stop reason: HOSPADM

## 2019-09-13 RX ORDER — MAGNESIUM SULFATE HEPTAHYDRATE 40 MG/ML
2 INJECTION, SOLUTION INTRAVENOUS ONCE
Status: COMPLETED | OUTPATIENT
Start: 2019-09-13 | End: 2019-09-13

## 2019-09-13 RX ORDER — GABAPENTIN 100 MG/1
100 CAPSULE ORAL 3 TIMES DAILY
Status: DISCONTINUED | OUTPATIENT
Start: 2019-09-13 | End: 2019-09-17 | Stop reason: HOSPADM

## 2019-09-13 RX ORDER — ONDANSETRON 2 MG/ML
4 INJECTION INTRAMUSCULAR; INTRAVENOUS EVERY 6 HOURS PRN
Status: DISCONTINUED | OUTPATIENT
Start: 2019-09-13 | End: 2019-09-17 | Stop reason: HOSPADM

## 2019-09-13 RX ORDER — FOLIC ACID 1 MG/1
1 TABLET ORAL DAILY
Status: DISCONTINUED | OUTPATIENT
Start: 2019-09-14 | End: 2019-09-17 | Stop reason: HOSPADM

## 2019-09-13 RX ORDER — LISINOPRIL 10 MG/1
10 TABLET ORAL DAILY
Status: DISCONTINUED | OUTPATIENT
Start: 2019-09-14 | End: 2019-09-17 | Stop reason: HOSPADM

## 2019-09-13 RX ORDER — POTASSIUM CHLORIDE 20 MEQ/1
40 TABLET, EXTENDED RELEASE ORAL ONCE
Status: COMPLETED | OUTPATIENT
Start: 2019-09-13 | End: 2019-09-13

## 2019-09-13 RX ORDER — MELATONIN
1000 DAILY
Status: DISCONTINUED | OUTPATIENT
Start: 2019-09-14 | End: 2019-09-17 | Stop reason: HOSPADM

## 2019-09-13 RX ORDER — CEFTRIAXONE 1 G/50ML
1000 INJECTION, SOLUTION INTRAVENOUS ONCE
Status: COMPLETED | OUTPATIENT
Start: 2019-09-13 | End: 2019-09-13

## 2019-09-13 RX ORDER — ALBUTEROL SULFATE 90 UG/1
2 AEROSOL, METERED RESPIRATORY (INHALATION) EVERY 4 HOURS PRN
Status: DISCONTINUED | OUTPATIENT
Start: 2019-09-13 | End: 2019-09-17 | Stop reason: HOSPADM

## 2019-09-13 RX ORDER — SODIUM CHLORIDE 1000 MG
1 TABLET, SOLUBLE MISCELLANEOUS 3 TIMES DAILY
Status: DISCONTINUED | OUTPATIENT
Start: 2019-09-14 | End: 2019-09-17 | Stop reason: HOSPADM

## 2019-09-13 RX ORDER — IPRATROPIUM BROMIDE AND ALBUTEROL SULFATE 2.5; .5 MG/3ML; MG/3ML
3 SOLUTION RESPIRATORY (INHALATION) ONCE
Status: COMPLETED | OUTPATIENT
Start: 2019-09-13 | End: 2019-09-13

## 2019-09-13 RX ORDER — LEVETIRACETAM 500 MG/1
1000 TABLET ORAL EVERY 12 HOURS SCHEDULED
Status: DISCONTINUED | OUTPATIENT
Start: 2019-09-13 | End: 2019-09-17 | Stop reason: HOSPADM

## 2019-09-13 RX ORDER — POTASSIUM CHLORIDE 14.9 MG/ML
20 INJECTION INTRAVENOUS ONCE
Status: COMPLETED | OUTPATIENT
Start: 2019-09-13 | End: 2019-09-13

## 2019-09-13 RX ORDER — THIAMINE MONONITRATE (VIT B1) 100 MG
100 TABLET ORAL DAILY
Status: DISCONTINUED | OUTPATIENT
Start: 2019-09-14 | End: 2019-09-17 | Stop reason: HOSPADM

## 2019-09-13 RX ORDER — AMITRIPTYLINE HYDROCHLORIDE 25 MG/1
25 TABLET, FILM COATED ORAL
Status: DISCONTINUED | OUTPATIENT
Start: 2019-09-13 | End: 2019-09-17 | Stop reason: HOSPADM

## 2019-09-13 RX ADMIN — MULTIPLE VITAMINS W/ MINERALS TAB 1 TABLET: TAB at 16:52

## 2019-09-13 RX ADMIN — FOLIC ACID 1 MG: 5 INJECTION, SOLUTION INTRAMUSCULAR; INTRAVENOUS; SUBCUTANEOUS at 17:45

## 2019-09-13 RX ADMIN — THIAMINE HYDROCHLORIDE 100 MG: 100 INJECTION, SOLUTION INTRAMUSCULAR; INTRAVENOUS at 17:15

## 2019-09-13 RX ADMIN — GABAPENTIN 100 MG: 100 CAPSULE ORAL at 23:13

## 2019-09-13 RX ADMIN — CEFTRIAXONE 1000 MG: 1 INJECTION, SOLUTION INTRAVENOUS at 16:16

## 2019-09-13 RX ADMIN — SODIUM CHLORIDE 1000 ML: 0.9 INJECTION, SOLUTION INTRAVENOUS at 18:18

## 2019-09-13 RX ADMIN — Medication 800 MG: at 23:17

## 2019-09-13 RX ADMIN — SODIUM CHLORIDE 1000 ML: 0.9 INJECTION, SOLUTION INTRAVENOUS at 16:41

## 2019-09-13 RX ADMIN — IPRATROPIUM BROMIDE AND ALBUTEROL SULFATE 3 ML: 2.5; .5 SOLUTION RESPIRATORY (INHALATION) at 16:02

## 2019-09-13 RX ADMIN — POTASSIUM CHLORIDE 20 MEQ: 14.9 INJECTION, SOLUTION INTRAVENOUS at 18:17

## 2019-09-13 RX ADMIN — POTASSIUM CHLORIDE 40 MEQ: 1500 TABLET, EXTENDED RELEASE ORAL at 23:12

## 2019-09-13 RX ADMIN — LEVETIRACETAM 1000 MG: 500 TABLET, FILM COATED ORAL at 23:13

## 2019-09-13 RX ADMIN — HYDROXYZINE HYDROCHLORIDE 25 MG: 25 TABLET ORAL at 21:54

## 2019-09-13 RX ADMIN — METRONIDAZOLE 500 MG: 500 INJECTION, SOLUTION INTRAVENOUS at 16:49

## 2019-09-13 RX ADMIN — MAGNESIUM SULFATE HEPTAHYDRATE 2 G: 40 INJECTION, SOLUTION INTRAVENOUS at 17:02

## 2019-09-13 NOTE — ED PROCEDURE NOTE
PROCEDURE  ECG 12 Lead Documentation Only  Date/Time: 9/13/2019 4:48 PM  Performed by: Sherrell Mejía MD  Authorized by: Sherrell Mejía MD     Indications / Diagnosis:  Fall, loc,   ECG reviewed by me, the ED Provider: yes    Patient location:  ED  Previous ECG:     Comparison to cardiac monitor: Yes    Interpretation:     Interpretation: abnormal    Rate:     ECG rate:  102    ECG rate assessment: tachycardic    Rhythm:     Rhythm: paced    Pacing:     Capture:  Complete  Ectopy:     Ectopy: none           Sherrell Mejía MD  09/13/19 6255

## 2019-09-13 NOTE — ED PROVIDER NOTES
History  Chief Complaint   Patient presents with   Soledad Galla Speech     Patient: Kaite Huertas  42 y o /male  YOB: 1965  MRN: [id number]  PCP: Rodriguez Easton PA-C  Date of evaluation: 9/13/2019    (YOLIS Badillo may have been used in the preparation of this document  Occasional wrong word or "sound-alike" substitutions may have occurred due to the inherent limitations of voice recognition software  Interpretation should be guided by context )    History provided by:  Patient and relative  History limited by:  Mental status change (intoxicated)  Altered Mental Status   Presenting symptoms comment:  Feels "dazed"  Severity:  Unable to specify  Most recent episode: Today  Timing:  Constant  Progression:  Unchanged  Chronicity:  New  Context: alcohol use    Context: not drug use, not head injury and not recent change in medication    Associated symptoms: slurred speech    Associated symptoms: no abdominal pain, no difficulty breathing, no fever, no light-headedness, no nausea, no palpitations, no rash, no vomiting and no weakness        Prior to Admission Medications   Prescriptions Last Dose Informant Patient Reported? Taking?    Multiple Vitamin (TAB-A-BONI PO)   Yes Yes   Sig: Take 1 tablet by mouth daily   acetaminophen (TYLENOL) 325 mg tablet   No Yes   Sig: Take 2 tablets every 6 hours as needed   albuterol (2 5 mg/3 mL) 0 083 % nebulizer solution   No Yes   Sig: Take 1 vial (2 5 mg total) by nebulization every 6 (six) hours as needed for wheezing or shortness of breath   albuterol (VENTOLIN HFA) 90 mcg/act inhaler   No Yes   Sig: Inhale 2 puffs every 4 (four) hours as needed for wheezing or shortness of breath   amitriptyline (ELAVIL) 25 mg tablet   No Yes   Sig: Take 1 tablet (25 mg total) by mouth daily at bedtime as needed for sleep   cholecalciferol (VITAMIN D3) 1,000 units tablet   No Yes   Sig: Take 1 tablet (1,000 Units total) by mouth daily   cyanocobalamin (VITAMIN B-12) 100 mcg tablet   No Yes   Sig: Take 1 tablet (100 mcg total) by mouth daily   folic acid (FOLVITE) 1 mg tablet   No Yes   Sig: Take 1 tablet (1 mg total) by mouth daily   gabapentin (NEURONTIN) 100 mg capsule   No Yes   Sig: TAKE 1 CAPSULE BY MOUTH THREE TIMES DAILY   levETIRAcetam (KEPPRA) 1000 MG tablet   No Yes   Sig: TAKE 1 TABLET BY MOUTH EVERY 12 HOURS   lisinopril (ZESTRIL) 10 mg tablet   No Yes   Sig: Take 1 tablet (10 mg total) by mouth daily   magnesium oxide (MAG-OX) 400 mg   No Yes   Sig: Take 2 tablets (800 mg total) by mouth 3 (three) times a day   naproxen (NAPROSYN) 500 mg tablet   No Yes   Sig: Take 1 tablet (500 mg total) by mouth 2 (two) times a day with meals   sodium chloride 1 g tablet   No Yes   Sig: Take 1 tablet (1 g total) by mouth 3 (three) times a day   thiamine 100 MG tablet   No Yes   Sig: Take 1 tablet (100 mg total) by mouth daily      Facility-Administered Medications: None       Past Medical History:   Diagnosis Date    Alcohol abuse     Bowel obstruction (HCC)     Bowel perforation (HCC)     Cardiac disease     Continuous chronic alcoholism ([address] ) 10/5/2017    COPD (chronic obstructive pulmonary disease) (HCC)     History of shoulder surgery     Right shoulder    History of transfusion     Hx of cervical spine surgery     Hypertension     Incisional hernia 10/8/2017    MI, old     Mitral regurgitation     Psychiatric disorder     Seizures ([address] )        Past Surgical History:   Procedure Laterality Date    APPENDECTOMY      BACK SURGERY      ESOPHAGOGASTRODUODENOSCOPY N/A 11/28/2016    Procedure: ESOPHAGOGASTRODUODENOSCOPY (EGD); Surgeon: Dayron Trejo MD;  [facility]: [facility];   Service:     GALLBLADDER SURGERY      LAPAROTOMY N/A 10/25/2016    Procedure: LAPAROTOMY EXPLORATORY;  Surgeon: Tomas Vargas MD;  Location: [facility];  Service:    [address] NECK FRACTURE      SHOULDER SURGERY Right     SHOULDER SURGERY      SMALL INTESTINE SURGERY      STOMACH SURGERY      bal surgery       Family History   Problem Relation Age of Onset    Breast cancer Mother     Prostate cancer Father     Skin cancer Brother      I have reviewed and agree with the history as documented  Social History     Tobacco Use    Smoking status: Current Every Day Smoker     Packs/day: 2 00     Years: 15 00     Pack years: 30 00     Types: Cigarettes    Smokeless tobacco: Never Used   Substance Use Topics    Alcohol use: Yes     Alcohol/week: 6 0 standard drinks     Types: 6 Cans of beer per week     Comment: 6 25oz cans a day    Drug use: No        Review of Systems   Constitutional: Negative  Negative for chills and fever  HENT: Negative  Negative for trouble swallowing and voice change  Eyes: Negative for photophobia and visual disturbance  Respiratory: Negative  Negative for cough and shortness of breath  Cardiovascular: Negative  Negative for chest pain and palpitations  Gastrointestinal: Negative  Negative for abdominal pain, nausea and vomiting  Endocrine: Negative  Negative for polydipsia and polyuria  Genitourinary: Negative  Negative for difficulty urinating and urgency  Musculoskeletal: Negative  Negative for back pain and neck pain  Skin: Negative  Negative for rash and wound  Allergic/Immunologic: Negative for immunocompromised state  Neurological: Negative  Negative for speech difficulty, weakness and light-headedness  Hematological: Negative  Negative for adenopathy  Does not bruise/bleed easily  Psychiatric/Behavioral: Positive for decreased concentration  All other systems reviewed and are negative  Physical Exam  Physical Exam   Constitutional: He is oriented to person, place, and time  He appears well-developed and well-nourished     HENT:   Mouth/Throat: Oropharynx is clear and moist and mucous membranes are normal    Voice normal   Eyes: Pupils are equal, round, and reactive to light  EOM are normal    Cardiovascular: Normal rate and regular rhythm  Pulmonary/Chest: Effort normal    Abdominal: Soft  Bowel sounds are normal    Genitourinary: Rectal exam shows no mass and no tenderness  Guaiac stool: negative  Neurological: He is alert and oriented to person, place, and time  He has normal strength  GCS eye subscore is 4  GCS verbal subscore is 5  GCS motor subscore is 6  Skin: Skin is warm and dry  Psychiatric: He has a normal mood and affect  His speech is normal and behavior is normal  He is attentive  Nursing note and vitals reviewed        Vital Signs  ED Triage Vitals [09/13/19 1257]   Temperature Pulse Respirations Blood Pressure SpO2   (!) 96 9 °F (36 1 °C) 80 18 112/81 97 %      Temp Source Heart Rate Source Patient Position - Orthostatic VS BP Location FiO2 (%)   Temporal Monitor Sitting Left arm --      Pain Score       7           Vitals:    09/13/19 1700 09/13/19 1750 09/13/19 1815 09/13/19 1835   BP: 93/57 102/61 110/69 100/70   Pulse: 82 89 77 68   Patient Position - Orthostatic VS: Sitting Sitting Lying Lying         Visual Acuity  Visual Acuity      Most Recent Value   L Pupil Size (mm)  3   R Pupil Size (mm)  3          ED Medications  Medications   potassium chloride 20 mEq IVPB (premix) (20 mEq Intravenous New Bag 9/13/19 1817)   cefTRIAXone (ROCEPHIN) IVPB (premix) 1,000 mg (0 mg Intravenous Stopped 9/13/19 1646)   metroNIDAZOLE (FLAGYL) IVPB (premix) 500 mg (0 mg Intravenous Stopped 9/13/19 1719)   ipratropium-albuterol (DUO-NEB) 0 5-2 5 mg/3 mL inhalation solution 3 mL (3 mL Nebulization Given 9/13/19 1602)   sodium chloride 0 9 % bolus 1,000 mL (0 mL Intravenous Stopped 9/13/19 1739)   multivitamin-minerals (CENTRUM) tablet 1 tablet (1 tablet Oral Given 1/93/29 9471)   folic acid 1 mg in sodium chloride 0 9 % 50 mL IVPB (0 mg Intravenous Stopped 9/13/19 1815)   thiamine (VITAMIN B1) 100 mg in sodium chloride 0 9 % 50 mL IVPB (0 mg Intravenous Stopped 9/13/19 1745)   magnesium sulfate 2 g/50 mL IVPB (premix) 2 g (0 g Intravenous Stopped 9/13/19 1800)   sodium chloride 0 9 % bolus 1,000 mL (1,000 mL Intravenous New Bag 9/13/19 1818)       Diagnostic Studies  Results Reviewed     Procedure Component Value Units Date/Time    Blood culture #2 [[id number]] Collected:  09/13/19 1608    Lab Status: In process Specimen:  Blood from Hand, Left Updated:  09/13/19 1611    Blood culture #1 [[id number]] Collected:  09/13/19 1608    Lab Status: In process Specimen:  Blood from Arm, Left Updated:  09/13/19 1611    Blood gas, arterial [[id number]]  (Abnormal) Collected:  09/13/19 1551    Lab Status:  Final result Specimen:  Blood, Arterial from Radial, Right Updated:  09/13/19 1601     pH, Arterial 7 408     pCO2, Arterial 43 5 mm Hg      pO2, Arterial 73 6 mm Hg      HCO3, Arterial 26 8 mmol/L      Base Excess, Arterial 1 9 mmol/L      O2 Content, Arterial 10 6 mL/dL      O2 HGB,Arterial  90 3 %      SOURCE Radial, Right     TREVER TEST Yes     ROOM AIR FIO2 21 %     [place] draw [[id number]] Collected:  09/13/19 1326    Lab Status:  Final result Specimen:  Blood from Arm, Right Updated:  09/13/19 1502    Narrative: The following orders were created for panel order [place] draw  Procedure                               Abnormality         Status                     ---------                               -----------         ------                     Ratna Rosales Top on PSPD[[id number]]                           Final result               Green / Black tube on YIQQ[[id number]]                       Final result                 Please view results for these tests on the individual orders      Ammonia [[id number]]  (Abnormal) Collected:  09/13/19 1423    Lab Status:  Final result Specimen:  Blood from Arm, Right Updated:  09/13/19 1441     Ammonia <10 umol/L     Rapid drug screen, urine [[id number]]  (Normal) Collected:  09/13/19 1416    Lab Status:  Final result Specimen: Urine, Clean Catch Updated:  09/13/19 1437     Amph/Meth UR Negative     Barbiturate Ur Negative     Benzodiazepine Urine Negative     Cocaine Urine Negative     Methadone Urine Negative     Opiate Urine Negative     PCP Ur Negative     THC Urine Negative    Narrative:       FOR MEDICAL PURPOSES ONLY  IF CONFIRMATION NEEDED PLEASE CONTACT THE LAB WITHIN 5 DAYS  Drug Screen Cutoff Levels:  AMPHETAMINE/METHAMPHETAMINES  1000 ng/mL  BARBITURATES     200 ng/mL  BENZODIAZEPINES     200 ng/mL  COCAINE      300 ng/mL  METHADONE      300 ng/mL  OPIATES      300 ng/mL  PHENCYCLIDINE     25 ng/mL  THC       50 ng/mL      CBC and differential [[id number]]  (Abnormal) Collected:  09/13/19 1326    Lab Status:  Final result Specimen:  Blood from Arm, Right Updated:  09/13/19 1429     WBC 5 60 Thousand/uL      RBC 3 89 Million/uL      Hemoglobin 8 1 g/dL      Hematocrit 27 8 %      MCV 72 fL      MCH 20 8 pg      MCHC 29 1 g/dL      RDW 21 2 %      MPV 8 1 fL      Platelets 814 Thousands/uL      nRBC 0 /100 WBCs     Narrative: This is an appended report  These results have been appended to a previously verified report  UA w Reflex to Microscopic [[id number]] Collected:  09/13/19 1416    Lab Status:  Final result Specimen:  Urine, Clean Catch Updated:  09/13/19 1428     Color, UA Light Yellow     Clarity, UA Clear     Specific Gravity, UA <=1 005     pH, UA 6 5     Leukocytes, UA Negative     Nitrite, UA Negative     Protein, UA Negative mg/dl      Glucose, UA Negative mg/dl      Ketones, UA Negative mg/dl      Urobilinogen, UA 0 2 E U /dl      Bilirubin, UA Negative     Blood, UA Negative    TSH [[id number]]  (Normal) Collected:  09/13/19 1326    Lab Status:  Final result Specimen:  Blood from Arm, Right Updated:  09/13/19 1403     TSH 3RD GENERATON 1 942 uIU/mL     Narrative:       Patients undergoing fluorescein dye angiography may retain small amounts of fluorescein in the body for 48-72 hours post procedure  Samples containing fluorescein can produce falsely depressed TSH values  If the patient had this procedure,a specimen should be resubmitted post fluorescein clearance  Magnesium [[id number]]  (Abnormal) Collected:  09/13/19 1326    Lab Status:  Final result Specimen:  Blood from Arm, Right Updated:  09/13/19 1403     Magnesium 1 3 mg/dL     Salicylate level [[id number]]  (Normal) Collected:  09/13/19 1326    Lab Status:  Final result Specimen:  Blood from Arm, Right Updated:  77/57/75 8246     Salicylate Lvl 3 1 mg/dL     Acetaminophen level-If concentration is detectable, please discuss with medical  on call   [[id number]]  (Abnormal) Collected:  09/13/19 1326    Lab Status:  Final result Specimen:  Blood from Arm, Right Updated:  09/13/19 1403     Acetaminophen Level <2 ug/mL     Comprehensive metabolic panel [[id number]]  (Abnormal) Collected:  09/13/19 1326    Lab Status:  Final result Specimen:  Blood from Arm, Right Updated:  09/13/19 1400     Sodium 130 mmol/L      Potassium 2 9 mmol/L      Chloride 91 mmol/L      CO2 29 mmol/L      ANION GAP 10 mmol/L      BUN 7 mg/dL      Creatinine 0 60 mg/dL      Glucose 112 mg/dL      Calcium 8 4 mg/dL      AST 57 U/L      ALT 25 U/L      Alkaline Phosphatase 184 U/L      Total Protein 8 3 g/dL      Albumin 3 8 g/dL      Total Bilirubin 0 30 mg/dL      eGFR 115 ml/min/1 73sq m     Narrative:       [place] guidelines for Chronic Kidney Disease (CKD):     Stage 1 with normal or high GFR (GFR > 90 mL/min/1 73 square meters)    Stage 2 Mild CKD (GFR = 60-89 mL/min/1 73 square meters)    Stage 3A Moderate CKD (GFR = 45-59 mL/min/1 73 square meters)    Stage 3B Moderate CKD (GFR = 30-44 mL/min/1 73 square meters)    Stage 4 Severe CKD (GFR = 15-29 mL/min/1 73 square meters)    Stage 5 End Stage CKD (GFR <15 mL/min/1 73 square meters)  Note: GFR calculation is accurate only with a steady state creatinine    Ethanol [[id number]] (Abnormal) Collected:  09/13/19 1326    Lab Status:  Final result Specimen:  Blood from Arm, Right Updated:  09/13/19 1356     Ethanol Lvl 401 mg/dL                  XR chest 2 views   Final Result by Pamela Vaca MD (09/13 1616)      No acute cardiopulmonary disease  Workstation performed: [id number]         CT head without contrast   Final Result by Ligia Chiang MD (09/13 1429)      No acute intracranial abnormality  Workstation performed: [id number]                    Procedures  Procedures       ED Course  ED Course as of Sep 13 1939   Fri Sep 13, 2019   1342 seven months ago was 13 57   Hemoglobin(!): 8 1   1342 MCHC(!): 29 1   1342 RDW(!): 21 2   1401 MEDICAL ALCOHOL(!): 401   1536 Texted SLIM      1629 SLIM consulted with GI - they request transfer for GI available  [address] [facility]  Past Surgical History:   Procedure Laterality Date    ERCP, DIAGNOSTIC, SPECIMEN COLLECTION N/A 10/22/2018   ENDOSCOPIC RETROGRADE CHOLANGIOPANCREATOGRAPHY (ERCP) DIAGNOSTIC performed by Saima Silver MD at [address] REPR/DEBRIDE N/A 10/19/2018   IMPLANTATION MESH WITH INCISIONAL/VENTRAL HERNIA performed by Pramod Sanderson MD at [facility] shoulder surgery, carpal tunnel 2013    INFORMATION Bilateral   hernia repair    NECK SPINE FUSION (CERV, BELOW C2) 7/30/2014   ARTHRODESIS SPINE ANTERIOR CERVICAL performed by Tyrese Chun MD at [address] 2016   perforated bowel    REMOVE GALLBLADDER 10/19/2018   CHOLECYSTECTOMY performed by Pramod Sanderson MD at [address] [address]; REDUCIBLE N/A 10/19/2018   REPAIR INITIAL INCISIONAL /VENTRAL HERNIA REDUCIBLE performed by Pramod Sanderson MD at [address]  Prem is going to speak with Nadanu DO for [facility]  regarding disposition         1913 Accepted by  Dr Carolin Voss at [facility]  Number of Diagnoses or Management Options  Diagnosis management comments: Nothing at present to suggest stroke, TIA, other intracranial problem  Amount and/or Complexity of Data Reviewed  Tests in the radiology section of CPT®: ordered and reviewed  Tests in the medicine section of CPT®: ordered and reviewed  Obtain history from someone other than the patient: yes  Independent visualization of images, tracings, or specimens: yes    Risk of Complications, Morbidity, and/or Mortality  Presenting problems: high  Diagnostic procedures: high    Patient Progress  Patient progress: stable      Disposition  Final diagnoses:   None     ED Disposition     ED Disposition Condition Date/Time Comment    Transfer to Another Facility-In Network  Fri Sep 13, 2019  4:24 PM Stephanie Guerrero should be transferred out for probable GI consultation          MD Documentation      Most Recent Value   Patient Condition  The patient has been stabilized such that within reasonable medical probability, no material deterioration of the patient condition or the condition of the unborn child(al) is likely to result from the transfer   Reason for Transfer  Level of Care needed not available at this facility   Benefits of Transfer  Specialized equipment and/or services available at the receiving facility (Include comment)________________________ [Trauma]   Risks of Transfer  Potential for delay in receiving treatment, Increased discomfort during transfer, Possible worsening of condition or death during transfer, Potential deterioration of medical condition, Loss of IV   Accepting Physician  Dr Albino Pete Name, [facility] (Name & Tel number)  [facility]   Sending MD Megan Ahmadi   Provider Certification  General risk, such as traffic hazards, adverse weather conditions, rough terrain or turbulence, possible failure of equipment (including vehicle or aircraft), or consequences of actions of persons outside the control of the transport personnel, Unanticipated needs of medical equipment and personnel during transport, Risk of worsening condition      RN Documentation      Most [address] [facility] Name, [facility] (Name & Tel number)  [facility]      Follow-up Information    None         Patient's Medications   Discharge Prescriptions    No medications on file     No discharge procedures on file      ED Provider  Electronically Signed by           Christiana Beal MD  09/14/19 6225

## 2019-09-13 NOTE — EMTALA/ACUTE CARE TRANSFER
[facility] EMERGENCY DEPARTMENT  [address] [facility] [address]  Dept: [phone]      EMTALA TRANSFER CONSENT    NAME Prince Katie REESE 1965                              MRN [id number]    I have been informed of my rights regarding examination, treatment, and transfer   by Dr Rosalinda Toussaint MD    Benefits: Specialized equipment and/or services available at the receiving facility (Include comment)________________________(Trauma)    Risks: Potential for delay in receiving treatment, Increased discomfort during transfer, Possible worsening of condition or death during transfer, Potential deterioration of medical condition, Loss of IV      Consent for Transfer:  I acknowledge that my medical condition has been evaluated and explained to me by the emergency department physician or other qualified medical person and/or my attending physician, who has recommended that I be transferred to the service of  Accepting Physician: Dr Juanito Watkins at [facility] Name, [address] : [address]  The above potential benefits of such transfer, the potential risks associated with such transfer, and the probable risks of not being transferred have been explained to me, and I fully understand them  The doctor has explained that, in my case, the benefits of transfer outweigh the risks  I agree to be transferred  I authorize the performance of emergency medical procedures and treatments upon me in both transit and upon arrival at the receiving facility  Additionally, I authorize the release of any and all medical records to the receiving facility and request they be transported with me, if possible  I understand that the safest mode of transportation during a medical emergency is an ambulance and that the Hospital advocates the use of this mode of transport   Risks of traveling to the receiving facility by car, including absence of medical control, life sustaining equipment, such as oxygen, and medical personnel has been explained to me and I fully understand them  (ESTHER CORRECT BOX BELOW)  [  ]  I consent to the stated transfer and to be transported by ambulance/helicopter  [  ]  I consent to the stated transfer, but refuse transportation by ambulance and accept full responsibility for my transportation by car  I understand the risks of non-ambulance transfers and I exonerate the Hospital and its staff from any deterioration in my condition that results from this refusal     X___________________________________________    DATE  19  TIME________  Signature of patient or legally responsible individual signing on patient behalf           RELATIONSHIP TO PATIENT_________________________          Provider Certification    NAME Rigoberto Cade                                         1965                              MRN [id number]    A medical screening exam was performed on the above named patient  Based on the examination:    Condition Necessitating Transfer There were no encounter diagnoses      Patient Condition: The patient has been stabilized such that within reasonable medical probability, no material deterioration of the patient condition or the condition of the unborn child(al) is likely to result from the transfer    Reason for Transfer: Level of Care needed not available at this facility    Transfer Requirements: [facility]   · Space available and qualified personnel available for treatment as acknowledged by [facility]  · Agreed to accept transfer and to provide appropriate medical treatment as acknowledged by       Dr Caron Krishna  · Appropriate medical records of the examination and treatment of the patient are provided at the time of transfer   [address], [address] _______  · Transfer will be performed by qualified personnel from    and appropriate transfer equipment as required, including the use of necessary and appropriate life support measures  Provider Certification: I have examined the patient and explained the following risks and benefits of being transferred/refusing transfer to the patient/family:  General risk, such as traffic hazards, adverse weather conditions, rough terrain or turbulence, possible failure of equipment (including vehicle or aircraft), or consequences of actions of persons outside the control of the transport personnel, Unanticipated needs of medical equipment and personnel during transport, Risk of worsening condition      Based on these reasonable risks and benefits to the patient and/or the unborn child(al), and based upon the information available at the time of the patients examination, I certify that the medical benefits reasonably to be expected from the provision of appropriate medical treatments at another medical facility outweigh the increasing risks, if any, to the individuals medical condition, and in the case of labor to the unborn child, from effecting the transfer      X____________________________________________ DATE 09/13/19        TIME_______      ORIGINAL - SEND TO MEDICAL RECORDS   COPY - SEND WITH PATIENT DURING TRANSFER

## 2019-09-13 NOTE — ED NOTES
Pt resting comfortable  Call bell within reach   Brother at [address] [place] [address]  09/13/19 0197

## 2019-09-14 LAB
ALBUMIN SERPL BCP-MCNC: 3 G/DL (ref 3.5–5)
ALP SERPL-CCNC: 127 U/L (ref 46–116)
ALT SERPL W P-5'-P-CCNC: 18 U/L (ref 12–78)
ANION GAP SERPL CALCULATED.3IONS-SCNC: 9 MMOL/L (ref 4–13)
AST SERPL W P-5'-P-CCNC: 58 U/L (ref 5–45)
BASOPHILS # BLD AUTO: 0.05 THOUSANDS/ΜL (ref 0–0.1)
BASOPHILS NFR BLD AUTO: 1 % (ref 0–1)
BILIRUB SERPL-MCNC: 0.24 MG/DL (ref 0.2–1)
BUN SERPL-MCNC: 3 MG/DL (ref 5–25)
CALCIUM SERPL-MCNC: 7.9 MG/DL (ref 8.3–10.1)
CHLORIDE SERPL-SCNC: 98 MMOL/L (ref 100–108)
CO2 SERPL-SCNC: 28 MMOL/L (ref 21–32)
CREAT SERPL-MCNC: 0.45 MG/DL (ref 0.6–1.3)
EOSINOPHIL # BLD AUTO: 0.05 THOUSAND/ΜL (ref 0–0.61)
EOSINOPHIL NFR BLD AUTO: 1 % (ref 0–6)
ERYTHROCYTE [DISTWIDTH] IN BLOOD BY AUTOMATED COUNT: 21.3 % (ref 11.6–15.1)
FERRITIN SERPL-MCNC: 7 NG/ML (ref 8–388)
GFR SERPL CREATININE-BSD FRML MDRD: 129 ML/MIN/1.73SQ M
GLUCOSE SERPL-MCNC: 92 MG/DL (ref 65–140)
HCT VFR BLD AUTO: 25.1 % (ref 36.5–49.3)
HGB BLD-MCNC: 7.4 G/DL (ref 12–17)
IMM GRANULOCYTES # BLD AUTO: 0.01 THOUSAND/UL (ref 0–0.2)
IMM GRANULOCYTES NFR BLD AUTO: 0 % (ref 0–2)
IRON SATN MFR SERPL: 3 %
IRON SERPL-MCNC: 14 UG/DL (ref 65–175)
LYMPHOCYTES # BLD AUTO: 1.05 THOUSANDS/ΜL (ref 0.6–4.47)
LYMPHOCYTES NFR BLD AUTO: 22 % (ref 14–44)
MAGNESIUM SERPL-MCNC: 1.3 MG/DL (ref 1.6–2.6)
MCH RBC QN AUTO: 21.2 PG (ref 26.8–34.3)
MCHC RBC AUTO-ENTMCNC: 29.5 G/DL (ref 31.4–37.4)
MCV RBC AUTO: 72 FL (ref 82–98)
MONOCYTES # BLD AUTO: 0.56 THOUSAND/ΜL (ref 0.17–1.22)
MONOCYTES NFR BLD AUTO: 12 % (ref 4–12)
NEUTROPHILS # BLD AUTO: 3.02 THOUSANDS/ΜL (ref 1.85–7.62)
NEUTS SEG NFR BLD AUTO: 64 % (ref 43–75)
NRBC BLD AUTO-RTO: 0 /100 WBCS
OSMOLALITY UR/SERPL-RTO: 285 MMOL/KG (ref 282–298)
OSMOLALITY UR: 352 MMOL/KG
PLATELET # BLD AUTO: 201 THOUSANDS/UL (ref 149–390)
PMV BLD AUTO: 7.9 FL (ref 8.9–12.7)
POTASSIUM SERPL-SCNC: 3.3 MMOL/L (ref 3.5–5.3)
PROT SERPL-MCNC: 6.9 G/DL (ref 6.4–8.2)
RBC # BLD AUTO: 3.49 MILLION/UL (ref 3.88–5.62)
SODIUM 24H UR-SCNC: 119 MOL/L
SODIUM SERPL-SCNC: 135 MMOL/L (ref 136–145)
TIBC SERPL-MCNC: 428 UG/DL (ref 250–450)
WBC # BLD AUTO: 4.74 THOUSAND/UL (ref 4.31–10.16)

## 2019-09-14 PROCEDURE — 85025 COMPLETE CBC W/AUTO DIFF WBC: CPT | Performed by: PHYSICIAN ASSISTANT

## 2019-09-14 PROCEDURE — 83935 ASSAY OF URINE OSMOLALITY: CPT | Performed by: PHYSICIAN ASSISTANT

## 2019-09-14 PROCEDURE — 83540 ASSAY OF IRON: CPT | Performed by: PHYSICIAN ASSISTANT

## 2019-09-14 PROCEDURE — 83930 ASSAY OF BLOOD OSMOLALITY: CPT | Performed by: PHYSICIAN ASSISTANT

## 2019-09-14 PROCEDURE — 80053 COMPREHEN METABOLIC PANEL: CPT | Performed by: PHYSICIAN ASSISTANT

## 2019-09-14 PROCEDURE — 82728 ASSAY OF FERRITIN: CPT | Performed by: PHYSICIAN ASSISTANT

## 2019-09-14 PROCEDURE — 99222 1ST HOSP IP/OBS MODERATE 55: CPT | Performed by: INTERNAL MEDICINE

## 2019-09-14 PROCEDURE — 84300 ASSAY OF URINE SODIUM: CPT | Performed by: PHYSICIAN ASSISTANT

## 2019-09-14 PROCEDURE — 83735 ASSAY OF MAGNESIUM: CPT | Performed by: PHYSICIAN ASSISTANT

## 2019-09-14 PROCEDURE — 83550 IRON BINDING TEST: CPT | Performed by: PHYSICIAN ASSISTANT

## 2019-09-14 PROCEDURE — 99232 SBSQ HOSP IP/OBS MODERATE 35: CPT | Performed by: INTERNAL MEDICINE

## 2019-09-14 RX ORDER — POTASSIUM CHLORIDE, DEXTROSE MONOHYDRATE AND SODIUM CHLORIDE 300; 5; 900 MG/100ML; G/100ML; MG/100ML
100 INJECTION, SOLUTION INTRAVENOUS CONTINUOUS
Status: DISCONTINUED | OUTPATIENT
Start: 2019-09-14 | End: 2019-09-15

## 2019-09-14 RX ORDER — MAGNESIUM SULFATE HEPTAHYDRATE 40 MG/ML
2 INJECTION, SOLUTION INTRAVENOUS ONCE
Status: COMPLETED | OUTPATIENT
Start: 2019-09-14 | End: 2019-09-14

## 2019-09-14 RX ORDER — CHLORDIAZEPOXIDE HYDROCHLORIDE 10 MG/1
10 CAPSULE, GELATIN COATED ORAL EVERY 8 HOURS SCHEDULED
Status: DISCONTINUED | OUTPATIENT
Start: 2019-09-14 | End: 2019-09-17 | Stop reason: HOSPADM

## 2019-09-14 RX ADMIN — LEVETIRACETAM 1000 MG: 500 TABLET, FILM COATED ORAL at 21:09

## 2019-09-14 RX ADMIN — MAGNESIUM SULFATE HEPTAHYDRATE 2 G: 40 INJECTION, SOLUTION INTRAVENOUS at 14:30

## 2019-09-14 RX ADMIN — GABAPENTIN 100 MG: 100 CAPSULE ORAL at 21:09

## 2019-09-14 RX ADMIN — FOLIC ACID 1 MG: 1 TABLET ORAL at 09:00

## 2019-09-14 RX ADMIN — POTASSIUM CHLORIDE, DEXTROSE MONOHYDRATE AND SODIUM CHLORIDE 100 ML/HR: 300; 5; 900 INJECTION, SOLUTION INTRAVENOUS at 14:40

## 2019-09-14 RX ADMIN — SODIUM CHLORIDE TAB 1 GM 1 G: 1 TAB at 21:09

## 2019-09-14 RX ADMIN — SODIUM CHLORIDE TAB 1 GM 1 G: 1 TAB at 16:45

## 2019-09-14 RX ADMIN — Medication 1 TABLET: at 09:00

## 2019-09-14 RX ADMIN — HYDROXYZINE HYDROCHLORIDE 25 MG: 25 TABLET ORAL at 02:48

## 2019-09-14 RX ADMIN — Medication 800 MG: at 09:00

## 2019-09-14 RX ADMIN — HYDROXYZINE HYDROCHLORIDE 25 MG: 25 TABLET ORAL at 17:36

## 2019-09-14 RX ADMIN — HYDROXYZINE HYDROCHLORIDE 25 MG: 25 TABLET ORAL at 11:25

## 2019-09-14 RX ADMIN — IRON SUCROSE 200 MG: 20 INJECTION, SOLUTION INTRAVENOUS at 16:45

## 2019-09-14 RX ADMIN — CHLORDIAZEPOXIDE HYDROCHLORIDE 10 MG: 10 CAPSULE ORAL at 14:30

## 2019-09-14 RX ADMIN — CHLORDIAZEPOXIDE HYDROCHLORIDE 10 MG: 10 CAPSULE ORAL at 21:09

## 2019-09-14 RX ADMIN — VITAMIN D, TAB 1000IU (100/BT) 1000 UNITS: 25 TAB at 09:00

## 2019-09-14 RX ADMIN — GABAPENTIN 100 MG: 100 CAPSULE ORAL at 09:00

## 2019-09-14 RX ADMIN — GABAPENTIN 100 MG: 100 CAPSULE ORAL at 16:45

## 2019-09-14 RX ADMIN — THIAMINE HCL TAB 100 MG 100 MG: 100 TAB at 09:00

## 2019-09-14 RX ADMIN — LISINOPRIL 10 MG: 10 TABLET ORAL at 09:00

## 2019-09-14 RX ADMIN — LEVETIRACETAM 1000 MG: 500 TABLET, FILM COATED ORAL at 09:00

## 2019-09-14 RX ADMIN — SODIUM CHLORIDE TAB 1 GM 1 G: 1 TAB at 09:00

## 2019-09-14 NOTE — UTILIZATION REVIEW
Initial Clinical Review    Admission: Date/Time/Statement: Inpatient Admission Orders (From admission, onward)     Ordered        09/13/19 2236  Inpatient Admission  Once                   Orders Placed This Encounter   Procedures    Inpatient Admission     Standing Status:   Standing     Number of Occurrences:   1     Order Specific Question:   Admitting Physician     Answer:   Sierra Evangelista [[id number]]     Order Specific Question:   Level of Care     Answer:   Med Surg [[id number]]     Order Specific Question:   Estimated length of stay     Answer:   More than 2 Midnights     Order Specific Question:   Certification     Answer:   I certify that inpatient services are medically necessary for this patient for a duration of greater than two midnights  See H&P and MD Progress Notes for additional information about the patient's course of treatment  ED Arrival Information     Patient not seen in ED  Transferred to [address] from [facility] ED for GI Consult due to decreased Hg                       Assessment/Plan:  50 yo male presented to ED @ [facility] with "feeling unwell"  Found to have hemoglobin of 8 1 - decreased from 13 5 as of January 2019  Heme neg in ED  Reports drinking upwards of 6 pack / day  Noted with Hypokalemia and Hypomag in ED - Repleted with KCL 20 IV and Mag Sulfate 2 G IV  ETOH 401  Admitted to IP with Microcytic Anemia, Alcohol Abuse, Hypokalemia, HypoMag  Plan includes: GI Consult, Check occult blood stool, iron panel, monitor Hg, CIWA protocol,  Thiamine, Folic acid, MVI  Additional po KCL  Recheck BMP in am  Also noted Hyponatremia - Question noncompliance with medications - is to be on sodium chloride tablets 1 g TID  Fluid Restrict  Check Na studies  Continue Keppra for Seizure disorder  9/14 Per GI:  Iron deficiency anemia  Pt denies overt GI bleeding  Will need to R/O GI source of blood loss including ulcer, AVM, large polyp, malignancy  Plan for egd / colonoscopy   Monitor hemoglobin and transfuse to keep hemoglobin > 7  Monitor hemoglobin and transfuse to keep hemoglobin > 7     Start Venofer IV x 3 doses  Add chlordiazepoxide to prevent withdrawal symptoms     Replete K & Mag    Admission  Vitals   Temperature Pulse Respirations Blood Pressure SpO2   09/13/19 2130 09/13/19 2130 09/13/19 2130 09/13/19 2130 09/13/19 2130   97 9 °F (36 6 °C) 69 14 115/80 99 %      Temp Source Heart Rate Source Patient Position - Orthostatic VS BP Location FiO2 (%)   09/13/19 2130 09/13/19 2341 09/13/19 2130 09/13/19 2130 --   Temporal Monitor Lying Left arm       Pain Score       09/13/19 2130       No Pain        Wt Readings from Last 1 Encounters:   09/13/19 62 3 kg (137 lb 5 6 oz)     Additional Vital Signs:   /14/19 0739  99 6 °F (37 6 °C)  82 20 136/88 93 % None (Room air) Lying   09/14/19 0330    83 18 129/71   Lying   09/14/19 0034    84  102/61      09/13/19 2341  98 1 °F (36 7 °C)  75 18 150/81 97 % None (Room air) Ly     CIWA Scores: 9/13 = 3;   9/14 = 0    Pertinent Labs/Diagnostic Test Results:   Results from last 7 days   Lab Units 09/14/19 0421 09/13/19  1326   WBC Thousand/uL 4 74 5 60   HEMOGLOBIN g/dL 7 4* 8 1*   HEMATOCRIT % 25 1* 27 8*   PLATELETS Thousands/uL 201 278   NEUTROS ABS Thousands/µL 3 02  --      Results from last 7 days   Lab Units 09/14/19 0420 09/13/19  1326   SODIUM mmol/L 135* 130*   POTASSIUM mmol/L 3 3* 2 9*   CHLORIDE mmol/L 98* 91*   CO2 mmol/L 28 29   ANION GAP mmol/L 9 10   BUN mg/dL 3* 7   CREATININE mg/dL 0 45* 0 60   EGFR ml/min/1 73sq m 129 115   CALCIUM mg/dL 7 9* 8 4   MAGNESIUM mg/dL 1 3* 1 3*     Results from last 7 days   Lab Units 09/14/19  0420 09/13/19  1423 09/13/19  1326   AST U/L 58*  --  57*   ALT U/L 18  --  25   ALK PHOS U/L 127*  --  184*   TOTAL PROTEIN g/dL 6 9  --  8 3*   ALBUMIN g/dL 3 0*  --  3 8   TOTAL BILIRUBIN mg/dL 0 24  --  0 30   AMMONIA umol/L  --  <10*  --      Results from last 7 days   Lab Units 09/14/19  1069 09/13/19  1326   GLUCOSE RANDOM mg/dL 92 112     Results from last 7 days   Lab Units 09/14/19  0420   OSMOLALITY, SERUM mmol/     Results from last 7 days   Lab Units 09/13/19  1551   PH ART  7 408   PCO2 ART mm Hg 43 5   PO2 ART mm Hg 73 6*   HCO3 ART mmol/L 26 8   BASE EXC ART mmol/L 1 9   O2 CONTENT ART mL/dL 10 6*   O2 HGB, ARTERIAL % 90 3*   ABG SOURCE  Radial, Right     Results from last 7 days   Lab Units 09/13/19  1326   TSH 3RD GENERATON uIU/mL 1 942     Results from last 7 days   Lab Units 09/14/19  0421   FERRITIN ng/mL 7*     Results from last 7 days   Lab Units 09/14/19  0405 09/13/19  1416   CLARITY UA   --  Clear   COLOR UA   --  Light Yellow   SPEC GRAV UA   --  <=1 005   PH UA   --  6 5   GLUCOSE UA mg/dl  --  Negative   KETONES UA mg/dl  --  Negative   BLOOD UA   --  Negative   PROTEIN UA mg/dl  --  Negative   NITRITE UA   --  Negative   BILIRUBIN UA   --  Negative   UROBILINOGEN UA E U /dl  --  0 2   LEUKOCYTES UA   --  Negative   SODIUM UR  119  --      Results from last 7 days   Lab Units 09/13/19  1416   AMPH/METH  Negative   BARBITURATE UR  Negative   BENZODIAZEPINE UR  Negative   COCAINE UR  Negative   METHADONE URINE  Negative   OPIATE UR  Negative   PCP UR  Negative   THC UR  Negative     Results from last 7 days   Lab Units 09/13/19  1326   ETHANOL LVL mg/dL 401*   ACETAMINOPHEN LVL ug/mL <2*   SALICYLATE LVL mg/dL 3 1     9/13 CXR @ [facility]:No acute cardiopulmonary disease  9/13 CT Head @ [facility]: No acute intracranial abnormality      Past Medical History:   Diagnosis Date    Alcohol abuse     Bowel obstruction (HCC)     Bowel perforation (HCC)     Cardiac disease     Continuous chronic alcoholism ([facility] ) 10/5/2017    COPD (chronic obstructive pulmonary disease) (HCC)     History of shoulder surgery     Right shoulder    History of transfusion     Hx of cervical spine surgery     Hypertension     Incisional hernia 10/8/2017    MI, old     Mitral regurgitation     Psychiatric disorder     Seizures ([facility] [address] )      Present on Admission:   Alcohol abuse, daily use   Hypomagnesemia   Hyponatremia   [facility]    Admitting Diagnosis: Anemia [D64 9]  Age/Sex: 49 y o  male  Admission Orders:    Current Facility-Administered Medications:  albuterol 2 puff Inhalation Q4H PRN    amitriptyline 25 mg Oral HS PRN    chlordiazePOXIDE 10 mg Oral Q8H [address]    cholecalciferol 1,000 Units Oral Daily    dextrose 5 % and sodium chloride 0 9 % with KCl 40 mEq/L 100 mL/hr Intravenous Continuous    folic acid 1 mg Oral Daily    gabapentin 100 mg Oral TID    hydrOXYzine HCL 25 mg Oral Q6H PRN  x1 9/13 & x 2 9/14   iron sucrose 200 mg Intravenous Once per day on Mon Wed Fri    levETIRAcetam 1,000 mg Oral Q12H [address]    lisinopril 10 mg Oral Daily    magnesium sulfate 2 g Intravenous Once 9/14   multivitamin-minerals 1 tablet Oral Daily    ondansetron 4 mg Intravenous Q6H PRN    sodium chloride 1 g Oral TID    thiamine 100 mg Oral Daily      KDUR 40 po x 1 9/13  TELE  IP CONSULT TO GASTROENTEROLOGY  IP CONSULT TO CASE MANAGEMENT  Bilat LE Duplex  CIWA Scores      Network Utilization Review Department  Phone: [phone]; Fax [phone]  [email or web address]  ATTENTION: Please call with any questions or concerns to [phone]  and carefully listen to the prompts so that you are directed to the right person  Send all requests for admission clinical reviews, approved or denied determinations and any other requests to fax [phone]   All voicemails are confidential

## 2019-09-14 NOTE — ASSESSMENT & PLAN NOTE
· Reports drinking upwards of a 6 pack per day  · Last drink yesterday afternoon    Does not appear to have any active withdrawal symptoms currently  · WA protocol  · Continue thiamine, folic acid, multivitamin supplementation

## 2019-09-14 NOTE — ED NOTES
Per Dr Karma Gamez, stop potassium infusion for transport    Patient received 88mL of 20meq potassium in 100ml      [facility], [address] [place] [address]  09/13/19 2005

## 2019-09-14 NOTE — CONSULTS
Consultation - [facility]  Tessie Daniels 49 y o  male MRN: [id number]  Unit/Bed#: E5 -01 Encounter: [id number]        Inpatient consult to gastroenterology  Consult performed by: Jose De Jesus Hernandez PA-C  Consult ordered by: Pat Aschoff, DO          Reason for Consult / Principal Problem:  Microcytic anemia    HPI:  81-year-old male with history of tobacco and alcohol abuse, seizures, COPD, perforated pyloric ulcer status post surgery in 2016, perforated appendicitis status post laparoscopic appendectomy in 2019, cholecystectomy, and hernia repair originally admitted with microcytic anemia  He originally presented to [address] [place] [address], but was transferred to [address] [place] [address] due to need for GI evaluation  He was found to have hemoglobin 8 1, down from 13 5 in January 2019  MCV low at 72  Iron studies also low consistent with iron deficiency anemia  He admits to dark colored solid stool, but denies black tarry stool and red blood in the stool  He denies abdominal pain, nausea, vomiting, diarrhea, constipation  He states his weight has been stable overall  He denies dysphagia and odynophagia  He admits to taking naproxen and Excedrin as needed at home, but not on a regular basis  He denies family history of colon cancer  He does state that his father was diagnosed with pancreatic cancer at age 62  He admits to drinking 6-10 beers daily, since he was 13  He has 30 pack-year history of smoking  He denies recreational drug use  His last EGD was in 2016 for melena following his surgery for perforated pyloric ulcer  EGD was normal at that time  He cannot remember his last colonoscopy  REVIEW OF SYSTEMS:    CONSTITUTIONAL: Denies any fever, chills  Good appetite, and no recent weight loss  HEENT: No earache or tinnitus  Denies hearing loss or visual disturbances  CARDIOVASCULAR: No chest pain or palpitations  RESPIRATORY:  + Cough   No shortness of breath  GASTROINTESTINAL: As noted in the History of Present Illness  GENITOURINARY: No problems with urination  Denies any hematuria or dysuria  NEUROLOGIC: No dizziness or vertigo, denies headaches  MUSCULOSKELETAL: Denies any muscle or joint pain  SKIN: Denies skin rashes or itching  ENDOCRINE: Denies excessive thirst  Denies intolerance to heat or cold  PSYCHOSOCIAL: Denies depression or anxiety  Denies any recent memory loss  Historical Information   Past Medical History:   Diagnosis Date    Alcohol abuse     Bowel obstruction (HCC)     Bowel perforation (HCC)     Cardiac disease     Continuous chronic alcoholism ([facility] [address] ) 10/5/2017    COPD (chronic obstructive pulmonary disease) (HCC)     History of shoulder surgery     Right shoulder    History of transfusion     Hx of cervical spine surgery     Hypertension     Incisional hernia 10/8/2017    MI, old     Mitral regurgitation     Psychiatric disorder     Seizures ([facility] [address] )      Past Surgical History:   Procedure Laterality Date    APPENDECTOMY      BACK SURGERY      ESOPHAGOGASTRODUODENOSCOPY N/A 11/28/2016    Procedure: ESOPHAGOGASTRODUODENOSCOPY (EGD); Surgeon: Kyree Liu MD;  Location:  GI LAB;   Service:     GALLBLADDER SURGERY      LAPAROTOMY N/A 10/25/2016    Procedure: LAPAROTOMY EXPLORATORY;  Surgeon: Page Melton MD;  Location: [facility];  Service:    Mayme Josh MOUTH SURGERY      PERCUTANEOUS PINNING FEMORAL NECK FRACTURE      SHOULDER SURGERY Right     SHOULDER SURGERY      SMALL INTESTINE SURGERY      STOMACH SURGERY      bal surgery     Social History   Social History     Substance and Sexual Activity   Alcohol Use Yes    Alcohol/week: 6 0 standard drinks    Types: 6 Cans of beer per week    Comment: 6 25oz cans a day     Social History     Substance and Sexual Activity   Drug Use No     Social History     Tobacco Use   Smoking Status Current Every Day Smoker    Packs/day: 2 00    Years: 15 00    Pack years: 30 00    Types: Cigarettes   Smokeless Tobacco Never Used     Family History   Problem Relation Age of Onset    Breast cancer Mother     Prostate cancer Father     Skin cancer Brother        Meds/Allergies     Medications Prior to Admission   Medication    acetaminophen (TYLENOL) 325 mg tablet    albuterol (VENTOLIN HFA) 90 mcg/act inhaler    amitriptyline (ELAVIL) 25 mg tablet    cholecalciferol (VITAMIN D3) 1,000 units tablet    cyanocobalamin (VITAMIN B-12) 100 mcg tablet    folic acid (FOLVITE) 1 mg tablet    gabapentin (NEURONTIN) 100 mg capsule    levETIRAcetam (KEPPRA) 1000 MG tablet    lisinopril (ZESTRIL) 10 mg tablet    magnesium oxide (MAG-OX) 400 mg    Multiple Vitamin (TAB-A-BONI PO)    naproxen (NAPROSYN) 500 mg tablet    sodium chloride 1 g tablet    thiamine 100 MG tablet    albuterol (2 5 mg/3 mL) 0 083 % nebulizer solution     Current Facility-Administered Medications   Medication Dose Route Frequency    albuterol (PROVENTIL HFA,VENTOLIN HFA) inhaler 2 puff  2 puff Inhalation Q4H PRN    amitriptyline (ELAVIL) tablet 25 mg  25 mg Oral HS PRN    cholecalciferol (VITAMIN D3) tablet 1,000 Units  1,000 Units Oral Daily    folic acid (FOLVITE) tablet 1 mg  1 mg Oral Daily    gabapentin (NEURONTIN) capsule 100 mg  100 mg Oral TID    hydrOXYzine HCL (ATARAX) tablet 25 mg  25 mg Oral Q6H PRN    levETIRAcetam (KEPPRA) tablet 1,000 mg  1,000 mg Oral Q12H [address]    lisinopril (ZESTRIL) tablet 10 mg  10 mg Oral Daily    magnesium oxide (MAG-OX) tablet 800 mg  800 mg Oral TID    multivitamin-minerals (CENTRUM) tablet 1 tablet  1 tablet Oral Daily    ondansetron (ZOFRAN) injection 4 mg  4 mg Intravenous Q6H PRN    sodium chloride tablet 1 g  1 g Oral TID    thiamine (VITAMIN B1) tablet 100 mg  100 mg Oral Daily       Allergies   Allergen Reactions    Cholestatin            Objective     Blood pressure 136/88, pulse 82, temperature 99 6 °F (37 6 °C), temperature source Temporal, resp  rate 20, SpO2 93 %  Intake/Output Summary (Last 24 hours) at 9/14/2019 1237  Last data filed at 9/14/2019 1126  Gross per 24 hour   Intake 360 ml   Output 1350 ml   Net -990 ml         PHYSICAL EXAM:      General Appearance:   Alert, chronically ill-appearing male, cooperative, no distress, appears stated age    HEENT:   Normocephalic, atraumatic, anicteric      Neck:  Supple, symmetrical, trachea midline, no adenopathy    Lungs:   Clear to auscultation bilaterally    Heart[de-identified]   S1 and S2 normal; regular rate and rhythm   Abdomen:   Nondistended  Normal bowel sounds  Soft  Mild-to-moderate epigastric tenderness to palpation  No rebound or guarding     Genitalia:   Deferred    Rectal:   Deferred    Extremities:  No cyanosis, clubbing or edema    Pulses:  2+ and symmetric all extremities    Skin:  No jaundice   Lymph nodes:  No palpable cervical lymphadenopathy        Lab Results:   Results from last 7 days   Lab Units 09/14/19  0421   WBC Thousand/uL 4 74   HEMOGLOBIN g/dL 7 4*   HEMATOCRIT % 25 1*   PLATELETS Thousands/uL 201   NEUTROS PCT % 64   LYMPHS PCT % 22   MONOS PCT % 12   EOS PCT % 1     Results from last 7 days   Lab Units 09/14/19  0420   POTASSIUM mmol/L 3 3*   CHLORIDE mmol/L 98*   CO2 mmol/L 28   BUN mg/dL 3*   CREATININE mg/dL 0 45*   CALCIUM mg/dL 7 9*   ALK PHOS U/L 127*   ALT U/L 18   AST U/L 58*               Imaging Studies: I have personally reviewed pertinent imaging studies  Xr Chest 2 Views    Result Date: 9/13/2019  Impression: No acute cardiopulmonary disease  Workstation performed: [id number]     Ct Head Without Contrast    Result Date: 9/13/2019  Impression: No acute intracranial abnormality   Workstation performed: [id number]       ASSESSMENT and PLAN:      Discussed plan with Dr Linn Median    71-year-old male with history of tobacco and alcohol abuse, seizures, COPD, perforated pyloric ulcer status post surgery in 2016, perforated appendicitis status post laparoscopic appendectomy in 2019, cholecystectomy, and hernia repair originally admitted with microcytic anemia  1) Iron deficiency anemia: He was found to have hemoglobin 8 1, down from 13 5 in January 2019  MCV low at 72  Iron studies also low consistent with iron deficiency anemia  He denies overt GI bleeding  Should rule out GI source of blood loss including ulcer, AVM, large polyp, malignancy      -Plan for EGD/colonoscopy on Monday  -May continue regular diet today, then clear liquids tomorrow with bowel prep  -Monitor hemoglobin and transfuse to keep hemoglobin > 7  -Patient would benefit from IV iron infusion while inpatient    2) Elevated liver enzymes in the setting of alcohol abuse: He drinks 6-10 beers daily for many many years  He was diagnosed with alcoholic hepatitis last year  AST 58, ALT 18, alkaline phosphatase 127, total bilirubin 0 24  His elevated liver enzymes likely secondary to alcoholic liver disease  Platelets and albumin normal   Liver appeared normal on CT scan from January 2019     -Discussed the importance of reducing alcohol consumption    Patient was seen and examined by Dr Mary Richards  All [facility] medical decisions were made by Dr Mary Richards  Thank you for allowing us to participate in the care of this present patient  We will follow-up with you closely

## 2019-09-14 NOTE — ASSESSMENT & PLAN NOTE
· Does not appear to be in acute exacerbation  · CXR no acute processes noted  · Continue inhaler p r n

## 2019-09-14 NOTE — ASSESSMENT & PLAN NOTE
· Sodium noted to be 130 - appears euvolemic      · Question noncompliance with medications - is to be on sodium chloride tablets 1 g TID  · Fluid restriction  · Check sodium studies - urine sodium, sodium osmolality, serum osmolality

## 2019-09-14 NOTE — PROGRESS NOTES
Progress Note - Prince Runner 1965, 49 y o  male MRN: [id number]    Unit/Bed#: E5 -01 Encounter: [id number]    Primary Care Provider: Franchesca Brunson PA-C   Date and time admitted to hospital: 9/13/2019  9:09 PM        * Microcytic anemia  Assessment & Plan  Iron deficiency anemia will give venofer x3 doses  For upper and lower endoscopy per GI  Results from last 7 days   Lab Units 09/14/19  0421 09/13/19  1326   HEMOGLOBIN g/dL 7 4* 8 1*         Results from last 7 days   Lab Units 09/14/19  0421   IRON ug/dL 14*   TIBC ug/dL 428       Alcohol abuse, daily use  Assessment & Plan  Alcohol use six cans of beers a day  Continue thiamine folic acid  At chlordiazepoxide to prevent withdrawal symptoms    Seizure [facility])  Assessment & Plan  Seizure disorder on keppra    Hypokalemia  Assessment & Plan  Hypokalemia will replete with IV fluids    Results from last 7 days   Lab Units 09/14/19  0420 09/13/19  1326   POTASSIUM mmol/L 3 3* 2 9*       Hypomagnesemia  Assessment & Plan  Hypomagnesemia  Replete with magnesium sulfate and recheck in morning along with phosphorus    Results from last 7 days   Lab Units 09/14/19  0420 09/13/19  1326   MAGNESIUM mg/dL 1 3* 1 3*       Hyponatremia  Assessment & Plan  Chronic hyponatremia on salt tablets  DC fluid restrictions for now and trial NSS+KCl    Tobacco abuse  Assessment & Plan  Tobacco user nicotine patch      VTE Pharmacologic Prophylaxis: Pharmacologic VTE Prophylaxis contraindicated due to anemia    Patient Centered Rounds: I have performed bedside rounds with nursing staff today  Discussions with Specialists or Other Care Team Provider:  GI  Education and Discussions with Family / Patient:     Time Spent for Care: 25 mins  More than 50% of total time spent on counseling and coordination of care as described above      Current Length of Stay: 1 day(s)  Current Patient Status: Inpatient     Certification Statement: The patient will continue to require additional inpatient hospital stay due to Microcytic anemia  Discharge Plan / Estimated Discharge Date:     Code Status: Level 1 - Full Code  ______________________________________________________________________________    Subjective:   Patient seen and examined  No new complaints  Feeling okay    Objective:   Vitals: Blood pressure 136/88, pulse 82, temperature 99 6 °F (37 6 °C), temperature source Temporal, resp  rate 20, SpO2 93 %  Physical Exam:   General appearance: alert, appears stated age and cooperative  Head: Normocephalic, without obvious abnormality, atraumatic  Lungs: clear to auscultation bilaterally  Heart: regular rate and rhythm  Abdomen: Soft scattered epigastric tenderness to palpation  Back: negative, range of motion normal  Extremities: extremities atraumatic, no cyanosis or edema  Neurologic: Grossly normal    Additional Data:   Labs:  Results from last 7 days   Lab Units 09/14/19  0421 09/13/19  1326   WBC Thousand/uL 4 74 5 60   HEMOGLOBIN g/dL 7 4* 8 1*   HEMATOCRIT % 25 1* 27 8*   MCV fL 72* 72*   TOTAL NEUT ABS Thousand/uL  --  3 36   PLATELETS Thousands/uL 201 278     Results from last 7 days   Lab Units 09/14/19  0420 09/13/19  1326   SODIUM mmol/L 135* 130*   POTASSIUM mmol/L 3 3* 2 9*   CHLORIDE mmol/L 98* 91*   CO2 mmol/L 28 29   ANION GAP mmol/L 9 10   BUN mg/dL 3* 7   CREATININE mg/dL 0 45* 0 60   CALCIUM mg/dL 7 9* 8 4   ALBUMIN g/dL 3 0* 3 8   TOTAL BILIRUBIN mg/dL 0 24 0 30   ALK PHOS U/L 127* 184*   ALT U/L 18 25   AST U/L 58* 57*   EGFR ml/min/1 73sq m 129 115   GLUCOSE RANDOM mg/dL 92 112     Results from last 7 days   Lab Units 09/14/19  0420 09/13/19  1326   MAGNESIUM mg/dL 1 3* 1 3*         Results from last 7 days   Lab Units 09/13/19  1326   TSH 3RD GENERATON uIU/mL 1 942     * I Have Reviewed All Lab Data Listed Above      Cultures:           Imaging:  Imaging Reports Reviewed Today Include:   Procedure: Xr Chest 2 Views  Result Date: 9/13/2019  Impression: No acute cardiopulmonary disease  Workstation performed: [id number]     Procedure: Ct Head Without Contrast  Result Date: 9/13/2019  Impression: No acute intracranial abnormality  Workstation performed: [id number]     Scheduled Meds:  Current Facility-Administered Medications:  albuterol 2 puff Inhalation Q4H PRN Mal Link, PA-C   amitriptyline 25 mg Oral HS PRN Mal Link, JJ   cholecalciferol 1,000 Units Oral Daily Rainell Bloodgood Fifi, [place]   folic acid 1 mg Oral Daily Rainell Bloodgood Fifi, PA-C   gabapentin 100 mg Oral TID Rainell Bloodgood Gyarmaty, PA-ANEGL   hydrOXYzine HCL 25 mg Oral Q6H PRN Mal Link, PA-C   levETIRAcetam 1,000 mg Oral Q12H [facility], PA-C   lisinopril 10 mg Oral Daily Rainell Bloodgood Fifi, PA-C   magnesium oxide 800 mg Oral TID Mal Link, PA-C   multivitamin-minerals 1 tablet Oral Daily Rainell Bloodgood Fifi, PA-C   ondansetron 4 mg Intravenous Q6H PRN Rainell Bloodgood Gyarmaty, PA-C   sodium chloride 1 g Oral TID Mal Link, PA-C   thiamine 100 mg Oral Daily Rainell Bloodgood Fifi, PA-ANGEL Morris DO  [facility]  Hospitalist    ** Please Note: This note has been constructed using a voice recognition system   **

## 2019-09-14 NOTE — ASSESSMENT & PLAN NOTE
· Potassium noted to be 2 9 at time of admission  · Received 20 mEq IV in ED    Give additional 40 mEq PO now  · Magnesium was also noted to be low - repleted  · Recheck BMP in AM

## 2019-09-14 NOTE — ASSESSMENT & PLAN NOTE
Hypokalemia will replete with IV fluids    Results from last 7 days   Lab Units 09/14/19  0420 09/13/19  1326   POTASSIUM mmol/L 3 3* 2 9*

## 2019-09-14 NOTE — H&P
H&P- Anna Jansen 1965, 49 y o  male MRN: [id number]    Unit/Bed#: E5 -01 Encounter: [id number]    Primary Care Provider: Cornelius Bocanegra PA-C   Date and time admitted to hospital: 9/13/2019  9:09 PM    * Microcytic anemia  Assessment & Plan  · Presented to the emergency department at [address] acutely intoxicated  · Found to have hemoglobin of 8 1 - decreased from 13 5 as of January 2019  · Transfer requested for gastroenterology consultation  · Likely has been chronic over the last several months as MCV is microcytic  · Heme negative in the ED - no active signs of bleeding currently  · Check occult blood stool, iron panel  · Continue to monitor hemoglobin    Alcohol abuse, daily use  Assessment & Plan  · Reports drinking upwards of a 6 pack per day  · Last drink yesterday afternoon  Does not appear to have any active withdrawal symptoms currently  · CIWA protocol  · Continue thiamine, folic acid, multivitamin supplementation    Hypokalemia  Assessment & Plan  · Potassium noted to be 2 9 at time of admission  · Received 20 mEq IV in ED  Give additional 40 mEq PO now  · Magnesium was also noted to be low - repleted  · Recheck BMP in AM    Hypomagnesemia  Assessment & Plan  · Magnesium noted to be 1 3  · Repleted with 2 g IV in the ED  · Re-check in AM    Hyponatremia  Assessment & Plan  · Sodium noted to be 130 - appears euvolemic  · Question noncompliance with medications - is to be on sodium chloride tablets 1 g TID  · Fluid restriction  · Check sodium studies - urine sodium, sodium osmolality, serum osmolality    Chronic obstructive pulmonary disease (HCC)  Assessment & Plan  · Does not appear to be in acute exacerbation  · CXR no acute processes noted  · Continue inhaler p r n      Tobacco abuse  Assessment & Plan  · Smokes upwards of 1 pack per day  · Counseled cessation  · Refused nicotine patch    VTE Prophylaxis: Ambulate  / reason for no mechanical VTE prophylaxis Ambulate   Code Status:  Level 1 - full code  POLST: There is no POLST form on file for this patient (pre-hospital)  Discussion with family:  Discussed with patient directly at bedside    Anticipated Length of Stay:  Patient will be admitted on an Inpatient basis with an anticipated length of stay of  greater than 2 midnights  Justification for Hospital Stay:  Microcytic anemia    Total Time for Visit, including Counseling / Coordination of Care: 1 hour  Greater than 50% of this total time spent on direct patient counseling and coordination of care  Chief Complaint:   "They told me my blood count was low so they sent me here"    History of Present Illness:    Misty Butts is a 49 y o  male who presents with macrocytic anemia  Past medical history significant for seizure disorder, alcohol abuse, hyponatremia, tobacco abuse, chronic obstructive pulmonary disease  Patient states that he began to feel [place]" earlier today and therefore presented to the emergency department  He states at that time he was told his blood count was low  He reports that he drinks upwards of one 6 pack per day nearly every day, last drink was earlier this evening  He denies any chest pain/palpitations, shortness of breath, nausea/vomiting, abdominal pain, constipation or diarrhea  He denies any bloody or melanotic stools  Denies any dizziness  Currently feels improved, asking for something to drink  Review of Systems:    Review of Systems   Constitutional: Positive for fatigue  Negative for chills, fever and unexpected weight change  HENT: Negative for congestion, sore throat and trouble swallowing  Eyes: Negative for photophobia, pain and visual disturbance  Respiratory: Negative for cough, shortness of breath and wheezing  Cardiovascular: Negative for chest pain, palpitations and leg swelling  Gastrointestinal: Negative for abdominal pain, constipation, diarrhea, nausea and vomiting  Endocrine: Negative for polyuria  Genitourinary: Negative for difficulty urinating, dysuria, flank pain, hematuria and urgency  Musculoskeletal: Negative for back pain, myalgias, neck pain and neck stiffness  Skin: Negative for pallor and rash  Neurological: Negative for dizziness, tremors, syncope, speech difficulty, weakness, light-headedness and headaches  Hematological: Does not bruise/bleed easily  Psychiatric/Behavioral: Negative for agitation and confusion  Past Medical and Surgical History:     Past Medical History:   Diagnosis Date    Alcohol abuse     Bowel obstruction (HCC)     [facility]     Cardiac disease     [facility]ism [facility] 10/5/2017    COPD (chronic obstructive pulmonary disease) (HCC)     History of shoulder surgery     Right shoulder    History of transfusion     Hx of cervical spine surgery     Hypertension     Incisional hernia 10/8/2017    MI, old     Mitral regurgitation     Psychiatric disorder     [facility]ures [facility]        Past Surgical History:   Procedure Laterality Date    APPENDECTOMY      BACK SURGERY      ESOPHAGOGASTRODUODENOSCOPY N/A 11/28/2016    Procedure: ESOPHAGOGASTRODUODENOSCOPY (EGD); Surgeon: Jaquan Colunga MD;  Location:  GI LAB; Service:     GALLBLADDER SURGERY      LAPAROTOMY N/A 10/25/2016    Procedure: LAPAROTOMY EXPLORATORY;  Surgeon: Ham Rooney MD;  Location: [facility];  Service:    Mellisa Mirian MOUTH SURGERY      PERCUTANEOUS PINNING FEMORAL NECK FRACTURE      SHOULDER SURGERY Right     SHOULDER SURGERY      SMALL INTESTINE SURGERY      STOMACH SURGERY      bal surgery       Meds/Allergies:    Prior to Admission medications    Medication Sig Start Date End Date Taking?  Authorizing Provider   acetaminophen (TYLENOL) 325 mg tablet Take 2 tablets every 6 hours as needed 8/5/17  Yes Edgar Valle MD   albuterol (VENTOLIN HFA) 90 mcg/act inhaler Inhale 2 puffs every 4 (four) hours as needed for wheezing or shortness of breath 1/17/19  Yes Cornelius Bocanegra PA-C   amitriptyline (ELAVIL) 25 mg tablet Take 1 tablet (25 mg total) by mouth daily at bedtime as needed for sleep 6/13/19  Yes Cornelius Bocanegra PA-C   cholecalciferol (VITAMIN D3) 1,000 units tablet Take 1 tablet (1,000 Units total) by mouth daily 7/17/18  Yes Cornelius Bocanegra PA-C   cyanocobalamin (VITAMIN B-12) 100 mcg tablet Take 1 tablet (100 mcg total) by mouth daily 7/17/18  Yes Cornelius Bocanegra PA-C   folic acid (FOLVITE) 1 mg tablet Take 1 tablet (1 mg total) by mouth daily 7/17/18  Yes Cornelius Bocanegra PA-C   gabapentin (NEURONTIN) 100 mg capsule TAKE 1 CAPSULE BY MOUTH THREE TIMES DAILY 4/10/19  Yes Cornelius Bocanegra PA-C   levETIRAcetam (KEPPRA) 1000 MG tablet TAKE 1 TABLET BY MOUTH EVERY 12 HOURS 5/8/19  Yes Cornelius Bocanegra PA-C   lisinopril (ZESTRIL) 10 mg tablet Take 1 tablet (10 mg total) by mouth daily 7/17/18  Yes Cornelius Bocanegra PA-C   magnesium oxide (MAG-OX) 400 mg Take 2 tablets (800 mg total) by mouth 3 (three) times a day 7/30/18  Yes Cornelius Bocanegra PA-C   Multiple Vitamin (TAB-A-BONI PO) Take 1 tablet by mouth daily   Yes Historical Provider, MD   naproxen (NAPROSYN) 500 mg tablet Take 1 tablet (500 mg total) by mouth 2 (two) times a day with meals 6/15/19  Yes Kb Baig PA-C   sodium chloride 1 g tablet Take 1 tablet (1 g total) by mouth 3 (three) times a day 7/17/18  Yes Cornelius Bocanegra PA-C   thiamine 100 MG tablet Take 1 tablet (100 mg total) by mouth daily 7/17/18  Yes Cornelius Bocanegra PA-C   albuterol (2 5 mg/3 mL) 0 083 % nebulizer solution Take 1 vial (2 5 mg total) by nebulization every 6 (six) hours as needed for wheezing or shortness of breath 7/17/18   Cornelius Bocanegra PA-C     I have reviewed home medications with patient personally  Allergies:    Allergies   Allergen Reactions    Cholestatin        Social History:     Marital Status: Single   Occupation:  Unemployed  Patient Pre-hospital Living Situation:  Lives alone  Patient Pre-hospital Level of Mobility:  Ambulatory  Patient Pre-hospital Diet Restrictions:  None  Substance Use History:   Social History     Substance and Sexual Activity   Alcohol Use Yes    Alcohol/week: 6 0 standard drinks    Types: 6 Cans of beer per week    Comment: 6 25oz cans a day     Social History     Tobacco Use   Smoking Status Current Every Day Smoker    Packs/day: 2 00    Years: 15 00    Pack years: 30 00    Types: Cigarettes   Smokeless Tobacco Never Used     Social History     Substance and Sexual Activity   Drug Use No       Family History:    Family History   Problem Relation Age of Onset    Breast cancer Mother     Prostate cancer Father     Skin cancer Brother        Physical Exam:     Vitals:   Blood Pressure: 102/61 (09/14/19 0034)  Pulse: 84 (09/14/19 0034)  Temperature: 97 6 °F (36 4 °C) (09/13/19 2355)  Temp Source: Temporal (09/13/19 2355)  Respirations: 18 (09/13/19 2355)  SpO2: 94 % (09/13/19 2355)    Physical Exam   Constitutional: He is oriented to person, place, and time  Vital signs are normal  He appears cachectic  Appears comfortable, no acute distress   HENT:   Head: Normocephalic  Eyes: Pupils are equal, round, and reactive to light  Conjunctivae and EOM are normal  No scleral icterus  Neck: Normal range of motion  Cardiovascular: Normal rate, regular rhythm and normal heart sounds  No murmur heard  Pulmonary/Chest: Effort normal  No respiratory distress  He has decreased breath sounds  He has no wheezes  He has no rhonchi  He has no rales  Overall diminished breath sounds bilaterally   Abdominal: Soft  Bowel sounds are normal  There is no tenderness  There is no rigidity, no rebound and no guarding  Musculoskeletal: He exhibits edema  He exhibits no tenderness or deformity  Able to move upper and lower extremities bilaterally without difficulty  Left lower extremity 2+ pitting edema > right   Neurological: He is alert and oriented to person, place, and time  Skin: Skin is warm and dry  Psychiatric: He has a normal mood and affect  His speech is normal and behavior is normal    Nursing note and vitals reviewed  Additional Data:     Lab Results: I have personally reviewed pertinent reports  Results from last 7 days   Lab Units 09/13/19  1326   WBC Thousand/uL 5 60   HEMOGLOBIN g/dL 8 1*   HEMATOCRIT % 27 8*   PLATELETS Thousands/uL 278   LYMPHO PCT % 23   MONO PCT % 11   EOS PCT % 2     Results from last 7 days   Lab Units 09/13/19  1326   SODIUM mmol/L 130*   POTASSIUM mmol/L 2 9*   CHLORIDE mmol/L 91*   CO2 mmol/L 29   BUN mg/dL 7   CREATININE mg/dL 0 60   ANION GAP mmol/L 10   CALCIUM mg/dL 8 4   ALBUMIN g/dL 3 8   TOTAL BILIRUBIN mg/dL 0 30   ALK PHOS U/L 184*   ALT U/L 25   AST U/L 57*   GLUCOSE RANDOM mg/dL 112                       Imaging: I have personally reviewed pertinent reports  VAS lower limb venous duplex study, complete bilateral    (Results Pending)       EKG, Pathology, and Other Studies Reviewed on Admission:   · Labs reviewed  · CXR reviewed    Allscripts / Epic Records Reviewed: Yes     ** Please Note: This note has been constructed using a voice recognition system   **

## 2019-09-14 NOTE — ASSESSMENT & PLAN NOTE
Hypomagnesemia    Replete with magnesium sulfate and recheck in morning along with phosphorus    Results from last 7 days   Lab Units 09/14/19  0420 09/13/19  1326   MAGNESIUM mg/dL 1 3* 1 3*

## 2019-09-14 NOTE — ASSESSMENT & PLAN NOTE
Iron deficiency anemia will give venofer x3 doses  For upper and lower endoscopy per GI      Results from last 7 days   Lab Units 09/14/19  0421 09/13/19  1326   HEMOGLOBIN g/dL 7 4* 8 1*         Results from last 7 days   Lab Units 09/14/19  0421   IRON ug/dL 14*   TIBC ug/dL 428

## 2019-09-15 LAB
ALBUMIN SERPL BCP-MCNC: 3.1 G/DL (ref 3.5–5)
ALP SERPL-CCNC: 136 U/L (ref 46–116)
ALT SERPL W P-5'-P-CCNC: 22 U/L (ref 12–78)
ANION GAP SERPL CALCULATED.3IONS-SCNC: 9 MMOL/L (ref 4–13)
AST SERPL W P-5'-P-CCNC: 51 U/L (ref 5–45)
ATRIAL RATE: 70 BPM
BILIRUB SERPL-MCNC: 0.58 MG/DL (ref 0.2–1)
BUN SERPL-MCNC: 4 MG/DL (ref 5–25)
CALCIUM SERPL-MCNC: 8.8 MG/DL (ref 8.3–10.1)
CHLORIDE SERPL-SCNC: 98 MMOL/L (ref 100–108)
CO2 SERPL-SCNC: 25 MMOL/L (ref 21–32)
CREAT SERPL-MCNC: 0.47 MG/DL (ref 0.6–1.3)
ERYTHROCYTE [DISTWIDTH] IN BLOOD BY AUTOMATED COUNT: 22 % (ref 11.6–15.1)
GFR SERPL CREATININE-BSD FRML MDRD: 127 ML/MIN/1.73SQ M
GLUCOSE SERPL-MCNC: 95 MG/DL (ref 65–140)
HCT VFR BLD AUTO: 29.8 % (ref 36.5–49.3)
HGB BLD-MCNC: 7.9 G/DL (ref 12–17)
MAGNESIUM SERPL-MCNC: 1.4 MG/DL (ref 1.6–2.6)
MCH RBC QN AUTO: 20.4 PG (ref 26.8–34.3)
MCHC RBC AUTO-ENTMCNC: 26.5 G/DL (ref 31.4–37.4)
MCV RBC AUTO: 77 FL (ref 82–98)
P AXIS: 70 DEGREES
PHOSPHATE SERPL-MCNC: 2.8 MG/DL (ref 2.7–4.5)
PLATELET # BLD AUTO: 205 THOUSANDS/UL (ref 149–390)
PMV BLD AUTO: 8.5 FL (ref 8.9–12.7)
POTASSIUM SERPL-SCNC: 4 MMOL/L (ref 3.5–5.3)
PR INTERVAL: 170 MS
PROT SERPL-MCNC: 7.3 G/DL (ref 6.4–8.2)
QRS AXIS: 40 DEGREES
QRSD INTERVAL: 100 MS
QT INTERVAL: 438 MS
QTC INTERVAL: 473 MS
RBC # BLD AUTO: 3.87 MILLION/UL (ref 3.88–5.62)
SODIUM SERPL-SCNC: 132 MMOL/L (ref 136–145)
T WAVE AXIS: 49 DEGREES
VENTRICULAR RATE: 70 BPM
WBC # BLD AUTO: 4.37 THOUSAND/UL (ref 4.31–10.16)

## 2019-09-15 PROCEDURE — 84100 ASSAY OF PHOSPHORUS: CPT | Performed by: INTERNAL MEDICINE

## 2019-09-15 PROCEDURE — 99232 SBSQ HOSP IP/OBS MODERATE 35: CPT | Performed by: INTERNAL MEDICINE

## 2019-09-15 PROCEDURE — 83735 ASSAY OF MAGNESIUM: CPT | Performed by: INTERNAL MEDICINE

## 2019-09-15 PROCEDURE — 80053 COMPREHEN METABOLIC PANEL: CPT | Performed by: INTERNAL MEDICINE

## 2019-09-15 PROCEDURE — 93010 ELECTROCARDIOGRAM REPORT: CPT | Performed by: INTERNAL MEDICINE

## 2019-09-15 PROCEDURE — 85027 COMPLETE CBC AUTOMATED: CPT | Performed by: INTERNAL MEDICINE

## 2019-09-15 RX ORDER — DEXTROSE AND SODIUM CHLORIDE 5; .9 G/100ML; G/100ML
75 INJECTION, SOLUTION INTRAVENOUS CONTINUOUS
Status: DISPENSED | OUTPATIENT
Start: 2019-09-15 | End: 2019-09-16

## 2019-09-15 RX ORDER — MAGNESIUM SULFATE HEPTAHYDRATE 40 MG/ML
2 INJECTION, SOLUTION INTRAVENOUS ONCE
Status: COMPLETED | OUTPATIENT
Start: 2019-09-15 | End: 2019-09-15

## 2019-09-15 RX ORDER — POLYETHYLENE GLYCOL 3350 17 G/17G
238 POWDER, FOR SOLUTION ORAL ONCE
Status: COMPLETED | OUTPATIENT
Start: 2019-09-15 | End: 2019-09-15

## 2019-09-15 RX ADMIN — GABAPENTIN 100 MG: 100 CAPSULE ORAL at 21:27

## 2019-09-15 RX ADMIN — HYDROXYZINE HYDROCHLORIDE 25 MG: 25 TABLET ORAL at 10:55

## 2019-09-15 RX ADMIN — VITAMIN D, TAB 1000IU (100/BT) 1000 UNITS: 25 TAB at 08:43

## 2019-09-15 RX ADMIN — HYDROXYZINE HYDROCHLORIDE 25 MG: 25 TABLET ORAL at 05:09

## 2019-09-15 RX ADMIN — CHLORDIAZEPOXIDE HYDROCHLORIDE 10 MG: 10 CAPSULE ORAL at 21:27

## 2019-09-15 RX ADMIN — SODIUM CHLORIDE TAB 1 GM 1 G: 1 TAB at 17:07

## 2019-09-15 RX ADMIN — LEVETIRACETAM 1000 MG: 500 TABLET, FILM COATED ORAL at 08:44

## 2019-09-15 RX ADMIN — LEVETIRACETAM 1000 MG: 500 TABLET, FILM COATED ORAL at 21:27

## 2019-09-15 RX ADMIN — THIAMINE HCL TAB 100 MG 100 MG: 100 TAB at 08:44

## 2019-09-15 RX ADMIN — GABAPENTIN 100 MG: 100 CAPSULE ORAL at 17:07

## 2019-09-15 RX ADMIN — MAGNESIUM SULFATE HEPTAHYDRATE 2 G: 40 INJECTION, SOLUTION INTRAVENOUS at 11:30

## 2019-09-15 RX ADMIN — CHLORDIAZEPOXIDE HYDROCHLORIDE 10 MG: 10 CAPSULE ORAL at 13:09

## 2019-09-15 RX ADMIN — SODIUM CHLORIDE TAB 1 GM 1 G: 1 TAB at 08:44

## 2019-09-15 RX ADMIN — POLYETHYLENE GLYCOL 3350 238 G: 17 POWDER, FOR SOLUTION ORAL at 17:25

## 2019-09-15 RX ADMIN — CHLORDIAZEPOXIDE HYDROCHLORIDE 10 MG: 10 CAPSULE ORAL at 05:09

## 2019-09-15 RX ADMIN — HYDROXYZINE HYDROCHLORIDE 25 MG: 25 TABLET ORAL at 21:27

## 2019-09-15 RX ADMIN — Medication 1 TABLET: at 08:44

## 2019-09-15 RX ADMIN — BISACODYL 10 MG: 5 TABLET, COATED ORAL at 17:07

## 2019-09-15 RX ADMIN — GABAPENTIN 100 MG: 100 CAPSULE ORAL at 08:44

## 2019-09-15 RX ADMIN — SODIUM CHLORIDE TAB 1 GM 1 G: 1 TAB at 21:27

## 2019-09-15 RX ADMIN — FOLIC ACID 1 MG: 1 TABLET ORAL at 08:44

## 2019-09-15 RX ADMIN — LISINOPRIL 10 MG: 10 TABLET ORAL at 08:44

## 2019-09-15 RX ADMIN — DEXTROSE AND SODIUM CHLORIDE 75 ML/HR: 5; .9 INJECTION, SOLUTION INTRAVENOUS at 13:09

## 2019-09-15 RX ADMIN — POTASSIUM CHLORIDE, DEXTROSE MONOHYDRATE AND SODIUM CHLORIDE 100 ML/HR: 300; 5; 900 INJECTION, SOLUTION INTRAVENOUS at 02:41

## 2019-09-15 NOTE — ASSESSMENT & PLAN NOTE
Chronic hyponatremia on salt tablets    Continue IVF but dc KCL    Results from last 7 days   Lab Units 09/15/19  0653 09/14/19  0420 09/13/19  1326   SODIUM mmol/L 132* 135* 130*

## 2019-09-15 NOTE — ASSESSMENT & PLAN NOTE
Hypomagnesemia  Continue to replete with magnesium sulfate    Hypophosphatemia has been repleted    Results from last 7 days   Lab Units 09/15/19  0653 09/14/19  0420 09/13/19  1326   MAGNESIUM mg/dL 1 4* 1 3* 1 3*   PHOSPHORUS mg/dL 2 8  --   --

## 2019-09-15 NOTE — ASSESSMENT & PLAN NOTE
Alcohol use six cans of beers a day  Continue thiamine folic acid    Added chlordiazepoxide to prevent withdrawal symptoms

## 2019-09-15 NOTE — ASSESSMENT & PLAN NOTE
Hypokalemia has been repleted with IV fluids    Results from last 7 days   Lab Units 09/15/19  0653 09/14/19  0420 09/13/19  1326   POTASSIUM mmol/L 4 0 3 3* 2 9*

## 2019-09-15 NOTE — PROGRESS NOTES
Progress Note - Basil Hand 1965, 49 y o  male MRN: [id number]    Unit/Bed#: E5 -01 Encounter: [id number]    Primary Care Provider: Hodan Batista PA-C   Date and time admitted to hospital: 9/13/2019  9:09 PM        * Microcytic anemia  Assessment & Plan  Iron deficiency anemia ordered venofer x3 doses  For upper and lower endoscopy per GI  Results from last 7 days   Lab Units 09/15/19  0653 09/14/19  0421 09/13/19  1326   HEMOGLOBIN g/dL 7 9* 7 4* 8 1*         Results from last 7 days   Lab Units 09/14/19  0421   IRON ug/dL 14*   TIBC ug/dL 428       Alcohol abuse, daily use  Assessment & Plan  Alcohol use six cans of beers a day  Continue thiamine folic acid  Added chlordiazepoxide to prevent withdrawal symptoms    [facility] )  Assessment & Plan  Seizure disorder on keppra    Hypokalemia  Assessment & Plan  Hypokalemia has been repleted with IV fluids    Results from last 7 days   Lab Units 09/15/19  0653 09/14/19  0420 09/13/19  1326   POTASSIUM mmol/L 4 0 3 3* 2 9*       Hypomagnesemia  Assessment & Plan  Hypomagnesemia  Continue to replete with magnesium sulfate  Hypophosphatemia has been repleted    Results from last 7 days   Lab Units 09/15/19  0653 09/14/19  0420 09/13/19  1326   MAGNESIUM mg/dL 1 4* 1 3* 1 3*   PHOSPHORUS mg/dL 2 8  --   --        Hyponatremia  Assessment & Plan  Chronic hyponatremia on salt tablets  Continue IVF but dc KCL    Results from last 7 days   Lab Units 09/15/19  0653 09/14/19  0420 09/13/19  1326   SODIUM mmol/L 132* 135* 130*       Tobacco abuse  Assessment & Plan  Tobacco user nicotine patch      VTE Pharmacologic Prophylaxis: Pharmacologic VTE Prophylaxis contraindicated due to anemia    Patient Centered Rounds: I have performed bedside rounds with nursing staff today  Discussions with Specialists or Other Care Team Provider:   Education and Discussions with Family / Patient:     Time Spent for Care: 25 mins    More than 50% of total time spent on counseling and coordination of care as described above  Current Length of Stay: 2 day(s)  Current Patient Status: Inpatient     Certification Statement: The patient will continue to require additional inpatient hospital stay due to Microcytic anemia  Discharge Plan / Estimated Discharge Date:     Code Status: Level 1 - Full Code  ______________________________________________________________________________    Subjective:   Patient seen and examined  No new complaints  Feeling well    Objective:   Vitals: Blood pressure 148/91, pulse 70, temperature 97 6 °F (36 4 °C), temperature source Temporal, resp  rate 18, SpO2 96 %      Physical Exam:   General appearance: alert, appears stated age and cooperative  Head: Normocephalic, without obvious abnormality, atraumatic  Lungs: diminished breath sounds  Heart: regular rate and rhythm  Abdomen: soft, non-tender, positive bowel sounds   Back: negative, range of motion normal  Extremities: extremities atraumatic, no cyanosis or edema  Neurologic: Grossly normal    Additional Data:   Labs:  Results from last 7 days   Lab Units 09/15/19  0653 09/14/19  0421 09/13/19  1326   WBC Thousand/uL 4 37 4 74 5 60   HEMOGLOBIN g/dL 7 9* 7 4* 8 1*   HEMATOCRIT % 29 8* 25 1* 27 8*   MCV fL 77* 72* 72*   TOTAL NEUT ABS Thousand/uL  --   --  3 36   PLATELETS Thousands/uL 205 201 278     Results from last 7 days   Lab Units 09/15/19  0653 09/14/19  0420 09/13/19  1326   SODIUM mmol/L 132* 135* 130*   POTASSIUM mmol/L 4 0 3 3* 2 9*   CHLORIDE mmol/L 98* 98* 91*   CO2 mmol/L 25 28 29   ANION GAP mmol/L 9 9 10   BUN mg/dL 4* 3* 7   CREATININE mg/dL 0 47* 0 45* 0 60   CALCIUM mg/dL 8 8 7 9* 8 4   ALBUMIN g/dL 3 1* 3 0* 3 8   TOTAL BILIRUBIN mg/dL 0 58 0 24 0 30   ALK PHOS U/L 136* 127* 184*   ALT U/L 22 18 25   AST U/L 51* 58* 57*   EGFR ml/min/1 73sq m 127 129 115   GLUCOSE RANDOM mg/dL 95 92 112     Results from last 7 days   Lab Units 09/15/19  0653 09/14/19  0420 09/13/19  8867 MAGNESIUM mg/dL 1 4* 1 3* 1 3*   PHOSPHORUS mg/dL 2 8  --   --                           Results from last 7 days   Lab Units 09/13/19  1326   TSH 3RD GENERATON uIU/mL 1 942     * I Have Reviewed All Lab Data Listed Above  Cultures:   Results from last 7 days   Lab Units 09/13/19  1608   BLOOD CULTURE  No Growth at 24 hrs  No Growth at 24 hrs  Scheduled Meds:  Current Facility-Administered Medications:  albuterol 2 puff Inhalation Q4H PRN Mirela Graven, PA-ANGEL    amitriptyline 25 mg Oral HS PRN Mirela Graven, JJ    bisacodyl 10 mg Oral Once [place], JJ    chlordiazePOXIDE 10 mg Oral Q8H [facility]    cholecalciferol 1,000 Units Oral Daily [facility], JJ    dextrose 5 % and sodium chloride 0 9 % with KCl 40 mEq/L 100 mL/hr Intravenous Continuous Westley Mcnulty  Last Rate: 100 mL/hr (04/65/38 7295)   folic acid 1 mg Oral Daily [facility] Gy[place], JJ    gabapentin 100 mg Oral TID [facility] GyConventJJ nguyen    hydrOXYzine HCL 25 mg Oral Q6H PRN [facility] n[facility]ne    iron sucrose 200 mg Intravenous Once per day on Mon Wed Fri [facility],  Last Rate: 200 mg (09/14/19 1645)   levETIRAcetam 1,000 mg Oral Q12H [facility] GyJJ hilliard    lisinopril 10 mg Oral Daily [facility] [place], PA-ANGEL    magnesium sulfate 2 g Intravenous Once Doris Alto, DO    multivitamin-minerals 1 tablet Oral Daily [facility] Fifi, PA-ANGEL    ondansetron 4 mg Intravenous Q6H PRN [facility] [place], PA-C    polyethylene glycol 238 g Oral Once [facility] [place], PA-C    sodium chloride 1 g Oral TID Mirela Graven, JJ    thiamine 100 mg Oral Daily [facility] Fifi, PA-ANGEL Alonso DO  [facility] Internal Medicine  Hospitalist    ** Please Note: This note has been constructed using a voice recognition system   **

## 2019-09-15 NOTE — PLAN OF CARE
Problem: PAIN - ADULT  Goal: Verbalizes/displays adequate comfort level or baseline comfort level  Description  Interventions:  - Encourage patient to monitor pain and request assistance  - Assess pain using appropriate pain scale  - Administer analgesics based on type and severity of pain and evaluate response  - Implement non-pharmacological measures as appropriate and evaluate response  - Consider cultural and social influences on pain and pain management  - Notify physician/advanced practitioner if interventions unsuccessful or patient reports new pain  Outcome: Progressing     Problem: INFECTION - ADULT  Goal: Absence or prevention of progression during hospitalization  Description  INTERVENTIONS:  - Assess and monitor for signs and symptoms of infection  - Monitor lab/diagnostic results  - Monitor all insertion sites, i e  indwelling lines, tubes, and drains  - Monitor endotracheal if appropriate and nasal secretions for changes in amount and color  - [place] appropriate cooling/warming therapies per order  - Administer medications as ordered  - Instruct and encourage patient and family to use good hand hygiene technique  - Identify and instruct in appropriate isolation precautions for identified infection/condition  Outcome: Progressing  Goal: Absence of fever/infection during neutropenic period  Description  INTERVENTIONS:  - Monitor WBC    Outcome: Progressing     Problem: SAFETY ADULT  Goal: Patient will remain free of falls  Description  INTERVENTIONS:  - Assess patient frequently for physical needs  -  Identify cognitive and physical deficits and behaviors that affect risk of falls    -  [place] fall precautions as indicated by assessment   - Educate patient/family on patient safety including physical limitations  - Instruct patient to call for assistance with activity based on assessment  - Modify environment to reduce risk of injury  - Consider OT/PT consult to assist with strengthening/mobility  Outcome: Progressing  Goal: Maintain or return to baseline ADL function  Description  INTERVENTIONS:  -  Assess patient's ability to carry out ADLs; assess patient's baseline for ADL function and identify physical deficits which impact ability to perform ADLs (bathing, care of mouth/teeth, toileting, grooming, dressing, etc )  - Assess/evaluate cause of self-care deficits   - Assess range of motion  - Assess patient's mobility; develop plan if impaired  - Assess patient's need for assistive devices and provide as appropriate  - Encourage maximum independence but intervene and supervise when necessary  - Involve family in performance of ADLs  - Assess for home care needs following discharge   - Consider OT consult to assist with ADL evaluation and planning for discharge  - Provide patient education as appropriate  Outcome: Progressing  Goal: Maintain or return mobility status to optimal level  Description  INTERVENTIONS:  - Assess patient's baseline mobility status (ambulation, transfers, stairs, etc )    - Identify cognitive and physical deficits and behaviors that affect mobility  - Identify mobility aids required to assist with transfers and/or ambulation (gait belt, sit-to-stand, lift, walker, cane, etc )  - [place] fall precautions as indicated by assessment  - Record patient progress and toleration of activity level on Mobility SBAR; progress patient to next Phase/Stage  - Instruct patient to call for assistance with activity based on assessment  - Consider rehabilitation consult to assist with strengthening/weightbearing, etc   Outcome: Progressing     Problem: DISCHARGE PLANNING  Goal: Discharge to home or other facility with appropriate resources  Description  INTERVENTIONS:  - Identify barriers to discharge w/patient and caregiver  - Arrange for needed discharge resources and transportation as appropriate  - Identify discharge learning needs (meds, wound care, etc )  - Arrange for interpretive services to assist at discharge as needed  - Refer to Case Management Department for coordinating discharge planning if the patient needs post-hospital services based on physician/advanced practitioner order or complex needs related to functional status, cognitive ability, or social support system  Outcome: Progressing     Problem: Knowledge Deficit  Goal: Patient/family/caregiver demonstrates understanding of disease process, treatment plan, medications, and discharge instructions  Description  Complete learning assessment and assess knowledge base  Interventions:  - Provide teaching at level of understanding  - Provide teaching via preferred learning methods  Outcome: Progressing     Problem: Potential for Falls  Goal: Patient will remain free of falls  Description  INTERVENTIONS:  - Assess patient frequently for physical needs  -  Identify cognitive and physical deficits and behaviors that affect risk of falls    -  [place] fall precautions as indicated by assessment   - Educate patient/family on patient safety including physical limitations  - Instruct patient to call for assistance with activity based on assessment  - Modify environment to reduce risk of injury  - Consider OT/PT consult to assist with strengthening/mobility  Outcome: Progressing     Problem: METABOLIC, FLUID AND ELECTROLYTES - ADULT  Goal: Electrolytes maintained within normal limits  Description  INTERVENTIONS:  - Monitor labs and assess patient for signs and symptoms of electrolyte imbalances  - Administer electrolyte replacement as ordered  - Monitor response to electrolyte replacements, including repeat lab results as appropriate  - Instruct patient on fluid and nutrition as appropriate  Outcome: Progressing  Goal: Fluid balance maintained  Description  INTERVENTIONS:  - Monitor labs   - Monitor I/O and WT  - Instruct patient on fluid and nutrition as appropriate  - Assess for signs & symptoms of volume excess or deficit  Outcome: Progressing  Goal: Glucose maintained within target range  Description  INTERVENTIONS:  - Monitor Blood Glucose as ordered  - Assess for signs and symptoms of hyperglycemia and hypoglycemia  - Administer ordered medications to maintain glucose within target range  - Assess nutritional intake and initiate nutrition service referral as needed  Outcome: Progressing     Problem: HEMATOLOGIC - ADULT  Goal: Maintains hematologic stability  Description  INTERVENTIONS  - Assess for signs and symptoms of bleeding or hemorrhage  - Monitor labs  - Administer supportive blood products/factors as ordered and appropriate  Outcome: Progressing

## 2019-09-15 NOTE — PLAN OF CARE
Problem: PAIN - ADULT  Goal: Verbalizes/displays adequate comfort level or baseline comfort level  Description  Interventions:  - Encourage patient to monitor pain and request assistance  - Assess pain using appropriate pain scale  - Administer analgesics based on type and severity of pain and evaluate response  - Implement non-pharmacological measures as appropriate and evaluate response  - Consider cultural and social influences on pain and pain management  - Notify physician/advanced practitioner if interventions unsuccessful or patient reports new pain  Outcome: Progressing     Problem: INFECTION - ADULT  Goal: Absence or prevention of progression during hospitalization  Description  INTERVENTIONS:  - Assess and monitor for signs and symptoms of infection  - Monitor lab/diagnostic results  - Monitor all insertion sites, i e  indwelling lines, tubes, and drains  - Monitor endotracheal if appropriate and nasal secretions for changes in amount and color  - [place] appropriate cooling/warming therapies per order  - Administer medications as ordered  - Instruct and encourage patient and family to use good hand hygiene technique  - Identify and instruct in appropriate isolation precautions for identified infection/condition  Outcome: Progressing  Goal: Absence of fever/infection during neutropenic period  Description  INTERVENTIONS:  - Monitor WBC    Outcome: Progressing     Problem: SAFETY ADULT  Goal: Patient will remain free of falls  Description  INTERVENTIONS:  - Assess patient frequently for physical needs  -  Identify cognitive and physical deficits and behaviors that affect risk of falls    -  [place] fall precautions as indicated by assessment   - Educate patient/family on patient safety including physical limitations  - Instruct patient to call for assistance with activity based on assessment  - Modify environment to reduce risk of injury  - Consider OT/PT consult to assist with strengthening/mobility  Outcome: Progressing  Goal: Maintain or return to baseline ADL function  Description  INTERVENTIONS:  -  Assess patient's ability to carry out ADLs; assess patient's baseline for ADL function and identify physical deficits which impact ability to perform ADLs (bathing, care of mouth/teeth, toileting, grooming, dressing, etc )  - Assess/evaluate cause of self-care deficits   - Assess range of motion  - Assess patient's mobility; develop plan if impaired  - Assess patient's need for assistive devices and provide as appropriate  - Encourage maximum independence but intervene and supervise when necessary  - Involve family in performance of ADLs  - Assess for home care needs following discharge   - Consider OT consult to assist with ADL evaluation and planning for discharge  - Provide patient education as appropriate  Outcome: Progressing  Goal: Maintain or return mobility status to optimal level  Description  INTERVENTIONS:  - Assess patient's baseline mobility status (ambulation, transfers, stairs, etc )    - Identify cognitive and physical deficits and behaviors that affect mobility  - Identify mobility aids required to assist with transfers and/or ambulation (gait belt, sit-to-stand, lift, walker, cane, etc )  - [place] fall precautions as indicated by assessment  - Record patient progress and toleration of activity level on Mobility SBAR; progress patient to next Phase/Stage  - Instruct patient to call for assistance with activity based on assessment  - Consider rehabilitation consult to assist with strengthening/weightbearing, etc   Outcome: Progressing     Problem: DISCHARGE PLANNING  Goal: Discharge to home or other facility with appropriate resources  Description  INTERVENTIONS:  - Identify barriers to discharge w/patient and caregiver  - Arrange for needed discharge resources and transportation as appropriate  - Identify discharge learning needs (meds, wound care, etc )  - Arrange for interpretive services to assist at discharge as needed  - Refer to Case Management Department for coordinating discharge planning if the patient needs post-hospital services based on physician/advanced practitioner order or complex needs related to functional status, cognitive ability, or social support system  Outcome: Progressing     Problem: Knowledge Deficit  Goal: Patient/family/caregiver demonstrates understanding of disease process, treatment plan, medications, and discharge instructions  Description  Complete learning assessment and assess knowledge base  Interventions:  - Provide teaching at level of understanding  - Provide teaching via preferred learning methods  Outcome: Progressing     Problem: Potential for Falls  Goal: Patient will remain free of falls  Description  INTERVENTIONS:  - Assess patient frequently for physical needs  -  Identify cognitive and physical deficits and behaviors that affect risk of falls    -  [place] fall precautions as indicated by assessment   - Educate patient/family on patient safety including physical limitations  - Instruct patient to call for assistance with activity based on assessment  - Modify environment to reduce risk of injury  - Consider OT/PT consult to assist with strengthening/mobility  Outcome: Progressing     Problem: METABOLIC, FLUID AND ELECTROLYTES - ADULT  Goal: Electrolytes maintained within normal limits  Description  INTERVENTIONS:  - Monitor labs and assess patient for signs and symptoms of electrolyte imbalances  - Administer electrolyte replacement as ordered  - Monitor response to electrolyte replacements, including repeat lab results as appropriate  - Instruct patient on fluid and nutrition as appropriate  Outcome: Progressing  Goal: Fluid balance maintained  Description  INTERVENTIONS:  - Monitor labs   - Monitor I/O and WT  - Instruct patient on fluid and nutrition as appropriate  - Assess for signs & symptoms of volume excess or deficit  Outcome: Progressing  Goal: Glucose maintained within target range  Description  INTERVENTIONS:  - Monitor Blood Glucose as ordered  - Assess for signs and symptoms of hyperglycemia and hypoglycemia  - Administer ordered medications to maintain glucose within target range  - Assess nutritional intake and initiate nutrition service referral as needed  Outcome: Progressing     Problem: HEMATOLOGIC - ADULT  Goal: Maintains hematologic stability  Description  INTERVENTIONS  - Assess for signs and symptoms of bleeding or hemorrhage  - Monitor labs  - Administer supportive blood products/factors as ordered and appropriate  Outcome: Progressing

## 2019-09-15 NOTE — ASSESSMENT & PLAN NOTE
Iron deficiency anemia ordered venofer x3 doses  For upper and lower endoscopy per GI      Results from last 7 days   Lab Units 09/15/19  0653 09/14/19  0421 09/13/19  1326   HEMOGLOBIN g/dL 7 9* 7 4* 8 1*         Results from last 7 days   Lab Units 09/14/19  0421   IRON ug/dL 14*   TIBC ug/dL 428

## 2019-09-15 NOTE — PLAN OF CARE
Problem: PAIN - ADULT  Goal: Verbalizes/displays adequate comfort level or baseline comfort level  Description  Interventions:  - Encourage patient to monitor pain and request assistance  - Assess pain using appropriate pain scale  - Administer analgesics based on type and severity of pain and evaluate response  - Implement non-pharmacological measures as appropriate and evaluate response  - Consider cultural and social influences on pain and pain management  - Notify physician/advanced practitioner if interventions unsuccessful or patient reports new pain  Outcome: Progressing     Problem: INFECTION - ADULT  Goal: Absence or prevention of progression during hospitalization  Description  INTERVENTIONS:  - Assess and monitor for signs and symptoms of infection  - Monitor lab/diagnostic results  - Monitor all insertion sites, i e  indwelling lines, tubes, and drains  - Monitor endotracheal if appropriate and nasal secretions for changes in amount and color  - [place] appropriate cooling/warming therapies per order  - Administer medications as ordered  - Instruct and encourage patient and family to use good hand hygiene technique  - Identify and instruct in appropriate isolation precautions for identified infection/condition  Outcome: Progressing  Goal: Absence of fever/infection during neutropenic period  Description  INTERVENTIONS:  - Monitor WBC    Outcome: Progressing     Problem: SAFETY ADULT  Goal: Patient will remain free of falls  Description  INTERVENTIONS:  - Assess patient frequently for physical needs  -  Identify cognitive and physical deficits and behaviors that affect risk of falls    -  [place] fall precautions as indicated by assessment   - Educate patient/family on patient safety including physical limitations  - Instruct patient to call for assistance with activity based on assessment  - Modify environment to reduce risk of injury  - Consider OT/PT consult to assist with strengthening/mobility  Outcome: Progressing  Goal: Maintain or return to baseline ADL function  Description  INTERVENTIONS:  -  Assess patient's ability to carry out ADLs; assess patient's baseline for ADL function and identify physical deficits which impact ability to perform ADLs (bathing, care of mouth/teeth, toileting, grooming, dressing, etc )  - Assess/evaluate cause of self-care deficits   - Assess range of motion  - Assess patient's mobility; develop plan if impaired  - Assess patient's need for assistive devices and provide as appropriate  - Encourage maximum independence but intervene and supervise when necessary  - Involve family in performance of ADLs  - Assess for home care needs following discharge   - Consider OT consult to assist with ADL evaluation and planning for discharge  - Provide patient education as appropriate  Outcome: Progressing  Goal: Maintain or return mobility status to optimal level  Description  INTERVENTIONS:  - Assess patient's baseline mobility status (ambulation, transfers, stairs, etc )    - Identify cognitive and physical deficits and behaviors that affect mobility  - Identify mobility aids required to assist with transfers and/or ambulation (gait belt, sit-to-stand, lift, walker, cane, etc )  - [place] fall precautions as indicated by assessment  - Record patient progress and toleration of activity level on Mobility SBAR; progress patient to next Phase/Stage  - Instruct patient to call for assistance with activity based on assessment  - Consider rehabilitation consult to assist with strengthening/weightbearing, etc   Outcome: Progressing     Problem: DISCHARGE PLANNING  Goal: Discharge to home or other facility with appropriate resources  Description  INTERVENTIONS:  - Identify barriers to discharge w/patient and caregiver  - Arrange for needed discharge resources and transportation as appropriate  - Identify discharge learning needs (meds, wound care, etc )  - Arrange for interpretive services to assist at discharge as needed  - Refer to Case Management Department for coordinating discharge planning if the patient needs post-hospital services based on physician/advanced practitioner order or complex needs related to functional status, cognitive ability, or social support system  Outcome: Progressing     Problem: Knowledge Deficit  Goal: Patient/family/caregiver demonstrates understanding of disease process, treatment plan, medications, and discharge instructions  Description  Complete learning assessment and assess knowledge base  Interventions:  - Provide teaching at level of understanding  - Provide teaching via preferred learning methods  Outcome: Progressing     Problem: Potential for Falls  Goal: Patient will remain free of falls  Description  INTERVENTIONS:  - Assess patient frequently for physical needs  -  Identify cognitive and physical deficits and behaviors that affect risk of falls    -  [place] fall precautions as indicated by assessment   - Educate patient/family on patient safety including physical limitations  - Instruct patient to call for assistance with activity based on assessment  - Modify environment to reduce risk of injury  - Consider OT/PT consult to assist with strengthening/mobility  Outcome: Progressing     Problem: METABOLIC, FLUID AND ELECTROLYTES - ADULT  Goal: Electrolytes maintained within normal limits  Description  INTERVENTIONS:  - Monitor labs and assess patient for signs and symptoms of electrolyte imbalances  - Administer electrolyte replacement as ordered  - Monitor response to electrolyte replacements, including repeat lab results as appropriate  - Instruct patient on fluid and nutrition as appropriate  Outcome: Progressing  Goal: Fluid balance maintained  Description  INTERVENTIONS:  - Monitor labs   - Monitor I/O and WT  - Instruct patient on fluid and nutrition as appropriate  - Assess for signs & symptoms of volume excess or deficit  Outcome: Progressing  Goal: Glucose maintained within target range  Description  INTERVENTIONS:  - Monitor Blood Glucose as ordered  - Assess for signs and symptoms of hyperglycemia and hypoglycemia  - Administer ordered medications to maintain glucose within target range  - Assess nutritional intake and initiate nutrition service referral as needed  Outcome: Progressing     Problem: HEMATOLOGIC - ADULT  Goal: Maintains hematologic stability  Description  INTERVENTIONS  - Assess for signs and symptoms of bleeding or hemorrhage  - Monitor labs  - Administer supportive blood products/factors as ordered and appropriate  Outcome: Progressing

## 2019-09-16 ENCOUNTER — ANESTHESIA (INPATIENT)
Dept: GASTROENTEROLOGY | Facility: HOSPITAL | Age: 54
DRG: 663 | End: 2019-09-16
Payer: COMMERCIAL

## 2019-09-16 ENCOUNTER — APPOINTMENT (INPATIENT)
Dept: NON INVASIVE DIAGNOSTICS | Facility: HOSPITAL | Age: 54
DRG: 663 | End: 2019-09-16
Payer: COMMERCIAL

## 2019-09-16 ENCOUNTER — APPOINTMENT (OUTPATIENT)
Dept: GASTROENTEROLOGY | Facility: HOSPITAL | Age: 54
DRG: 663 | End: 2019-09-16
Payer: COMMERCIAL

## 2019-09-16 ENCOUNTER — ANESTHESIA EVENT (INPATIENT)
Dept: GASTROENTEROLOGY | Facility: HOSPITAL | Age: 54
DRG: 663 | End: 2019-09-16
Payer: COMMERCIAL

## 2019-09-16 LAB
ALBUMIN SERPL BCP-MCNC: 3.3 G/DL (ref 3.5–5)
ALP SERPL-CCNC: 135 U/L (ref 46–116)
ALT SERPL W P-5'-P-CCNC: 32 U/L (ref 12–78)
ANION GAP SERPL CALCULATED.3IONS-SCNC: 8 MMOL/L (ref 4–13)
AST SERPL W P-5'-P-CCNC: 79 U/L (ref 5–45)
BILIRUB SERPL-MCNC: 0.53 MG/DL (ref 0.2–1)
BUN SERPL-MCNC: <1 MG/DL (ref 5–25)
CALCIUM SERPL-MCNC: 8.6 MG/DL (ref 8.3–10.1)
CHLORIDE SERPL-SCNC: 96 MMOL/L (ref 100–108)
CO2 SERPL-SCNC: 26 MMOL/L (ref 21–32)
CREAT SERPL-MCNC: 0.5 MG/DL (ref 0.6–1.3)
ERYTHROCYTE [DISTWIDTH] IN BLOOD BY AUTOMATED COUNT: 20.9 % (ref 11.6–15.1)
GFR SERPL CREATININE-BSD FRML MDRD: 124 ML/MIN/1.73SQ M
GLUCOSE SERPL-MCNC: 95 MG/DL (ref 65–140)
HCT VFR BLD AUTO: 27.2 % (ref 36.5–49.3)
HGB BLD-MCNC: 7.7 G/DL (ref 12–17)
MAGNESIUM SERPL-MCNC: 1.4 MG/DL (ref 1.6–2.6)
MCH RBC QN AUTO: 20.8 PG (ref 26.8–34.3)
MCHC RBC AUTO-ENTMCNC: 28.3 G/DL (ref 31.4–37.4)
MCV RBC AUTO: 74 FL (ref 82–98)
PLATELET # BLD AUTO: 211 THOUSANDS/UL (ref 149–390)
PMV BLD AUTO: 9.1 FL (ref 8.9–12.7)
POTASSIUM SERPL-SCNC: 3.2 MMOL/L (ref 3.5–5.3)
PROT SERPL-MCNC: 7.7 G/DL (ref 6.4–8.2)
RBC # BLD AUTO: 3.7 MILLION/UL (ref 3.88–5.62)
SODIUM SERPL-SCNC: 130 MMOL/L (ref 136–145)
WBC # BLD AUTO: 4.87 THOUSAND/UL (ref 4.31–10.16)

## 2019-09-16 PROCEDURE — 0DB98ZX EXCISION OF DUODENUM, VIA NATURAL OR ARTIFICIAL OPENING ENDOSCOPIC, DIAGNOSTIC: ICD-10-PCS | Performed by: INTERNAL MEDICINE

## 2019-09-16 PROCEDURE — 99232 SBSQ HOSP IP/OBS MODERATE 35: CPT | Performed by: INTERNAL MEDICINE

## 2019-09-16 PROCEDURE — 88342 IMHCHEM/IMCYTCHM 1ST ANTB: CPT | Performed by: PATHOLOGY

## 2019-09-16 PROCEDURE — 80053 COMPREHEN METABOLIC PANEL: CPT | Performed by: INTERNAL MEDICINE

## 2019-09-16 PROCEDURE — 45381 COLONOSCOPY SUBMUCOUS NJX: CPT | Performed by: INTERNAL MEDICINE

## 2019-09-16 PROCEDURE — 93970 EXTREMITY STUDY: CPT | Performed by: SURGERY

## 2019-09-16 PROCEDURE — 0DB68ZX EXCISION OF STOMACH, VIA NATURAL OR ARTIFICIAL OPENING ENDOSCOPIC, DIAGNOSTIC: ICD-10-PCS | Performed by: INTERNAL MEDICINE

## 2019-09-16 PROCEDURE — 88305 TISSUE EXAM BY PATHOLOGIST: CPT | Performed by: PATHOLOGY

## 2019-09-16 PROCEDURE — 93970 EXTREMITY STUDY: CPT

## 2019-09-16 PROCEDURE — 43239 EGD BIOPSY SINGLE/MULTIPLE: CPT | Performed by: INTERNAL MEDICINE

## 2019-09-16 PROCEDURE — 83735 ASSAY OF MAGNESIUM: CPT | Performed by: INTERNAL MEDICINE

## 2019-09-16 PROCEDURE — 0DBK8ZZ EXCISION OF ASCENDING COLON, VIA NATURAL OR ARTIFICIAL OPENING ENDOSCOPIC: ICD-10-PCS | Performed by: INTERNAL MEDICINE

## 2019-09-16 PROCEDURE — 85027 COMPLETE CBC AUTOMATED: CPT | Performed by: INTERNAL MEDICINE

## 2019-09-16 PROCEDURE — 45385 COLONOSCOPY W/LESION REMOVAL: CPT | Performed by: INTERNAL MEDICINE

## 2019-09-16 RX ORDER — DIPHENHYDRAMINE HYDROCHLORIDE 50 MG/ML
12.5 INJECTION INTRAMUSCULAR; INTRAVENOUS ONCE
Status: DISCONTINUED | OUTPATIENT
Start: 2019-09-16 | End: 2019-09-16

## 2019-09-16 RX ORDER — PANTOPRAZOLE SODIUM 40 MG/1
40 TABLET, DELAYED RELEASE ORAL
Status: DISCONTINUED | OUTPATIENT
Start: 2019-09-16 | End: 2019-09-17 | Stop reason: HOSPADM

## 2019-09-16 RX ORDER — SODIUM CHLORIDE 9 MG/ML
125 INJECTION, SOLUTION INTRAVENOUS CONTINUOUS
Status: DISCONTINUED | OUTPATIENT
Start: 2019-09-16 | End: 2019-09-17 | Stop reason: HOSPADM

## 2019-09-16 RX ORDER — PROPOFOL 10 MG/ML
INJECTION, EMULSION INTRAVENOUS AS NEEDED
Status: DISCONTINUED | OUTPATIENT
Start: 2019-09-16 | End: 2019-09-16 | Stop reason: SURG

## 2019-09-16 RX ADMIN — GABAPENTIN 100 MG: 100 CAPSULE ORAL at 21:21

## 2019-09-16 RX ADMIN — Medication 1 TABLET: at 08:23

## 2019-09-16 RX ADMIN — LEVETIRACETAM 1000 MG: 500 TABLET, FILM COATED ORAL at 08:23

## 2019-09-16 RX ADMIN — PROPOFOL 30 MG: 10 INJECTION, EMULSION INTRAVENOUS at 15:54

## 2019-09-16 RX ADMIN — GABAPENTIN 100 MG: 100 CAPSULE ORAL at 08:24

## 2019-09-16 RX ADMIN — PROPOFOL 30 MG: 10 INJECTION, EMULSION INTRAVENOUS at 16:33

## 2019-09-16 RX ADMIN — PROPOFOL 30 MG: 10 INJECTION, EMULSION INTRAVENOUS at 15:57

## 2019-09-16 RX ADMIN — DEXTROSE AND SODIUM CHLORIDE 75 ML/HR: 5; .9 INJECTION, SOLUTION INTRAVENOUS at 03:07

## 2019-09-16 RX ADMIN — PROPOFOL 30 MG: 10 INJECTION, EMULSION INTRAVENOUS at 16:25

## 2019-09-16 RX ADMIN — PROPOFOL 30 MG: 10 INJECTION, EMULSION INTRAVENOUS at 16:06

## 2019-09-16 RX ADMIN — THIAMINE HCL TAB 100 MG 100 MG: 100 TAB at 08:23

## 2019-09-16 RX ADMIN — PROPOFOL 30 MG: 10 INJECTION, EMULSION INTRAVENOUS at 16:17

## 2019-09-16 RX ADMIN — PROPOFOL 40 MG: 10 INJECTION, EMULSION INTRAVENOUS at 16:04

## 2019-09-16 RX ADMIN — SODIUM CHLORIDE TAB 1 GM 1 G: 1 TAB at 08:23

## 2019-09-16 RX ADMIN — LISINOPRIL 10 MG: 10 TABLET ORAL at 08:23

## 2019-09-16 RX ADMIN — CHLORDIAZEPOXIDE HYDROCHLORIDE 10 MG: 10 CAPSULE ORAL at 05:33

## 2019-09-16 RX ADMIN — PANTOPRAZOLE SODIUM 40 MG: 40 TABLET, DELAYED RELEASE ORAL at 17:15

## 2019-09-16 RX ADMIN — LEVETIRACETAM 1000 MG: 500 TABLET, FILM COATED ORAL at 21:21

## 2019-09-16 RX ADMIN — HYDROXYZINE HYDROCHLORIDE 25 MG: 25 TABLET ORAL at 04:22

## 2019-09-16 RX ADMIN — PROPOFOL 30 MG: 10 INJECTION, EMULSION INTRAVENOUS at 16:11

## 2019-09-16 RX ADMIN — SODIUM CHLORIDE TAB 1 GM 1 G: 1 TAB at 17:15

## 2019-09-16 RX ADMIN — IRON SUCROSE 200 MG: 20 INJECTION, SOLUTION INTRAVENOUS at 08:31

## 2019-09-16 RX ADMIN — VITAMIN D, TAB 1000IU (100/BT) 1000 UNITS: 25 TAB at 08:24

## 2019-09-16 RX ADMIN — FOLIC ACID 1 MG: 1 TABLET ORAL at 08:23

## 2019-09-16 RX ADMIN — HYDROXYZINE HYDROCHLORIDE 25 MG: 25 TABLET ORAL at 17:15

## 2019-09-16 RX ADMIN — PROPOFOL 30 MG: 10 INJECTION, EMULSION INTRAVENOUS at 16:09

## 2019-09-16 RX ADMIN — PROPOFOL 30 MG: 10 INJECTION, EMULSION INTRAVENOUS at 16:20

## 2019-09-16 RX ADMIN — SODIUM CHLORIDE TAB 1 GM 1 G: 1 TAB at 21:21

## 2019-09-16 RX ADMIN — PROPOFOL 30 MG: 10 INJECTION, EMULSION INTRAVENOUS at 16:22

## 2019-09-16 RX ADMIN — PROPOFOL 30 MG: 10 INJECTION, EMULSION INTRAVENOUS at 16:14

## 2019-09-16 RX ADMIN — CHLORDIAZEPOXIDE HYDROCHLORIDE 10 MG: 10 CAPSULE ORAL at 17:15

## 2019-09-16 RX ADMIN — PROPOFOL 30 MG: 10 INJECTION, EMULSION INTRAVENOUS at 16:31

## 2019-09-16 RX ADMIN — PROPOFOL 140 MG: 10 INJECTION, EMULSION INTRAVENOUS at 15:50

## 2019-09-16 RX ADMIN — LIDOCAINE HYDROCHLORIDE 100 MG: 20 INJECTION, SOLUTION INTRAVENOUS at 15:50

## 2019-09-16 RX ADMIN — SODIUM CHLORIDE 125 ML/HR: 0.9 INJECTION, SOLUTION INTRAVENOUS at 17:15

## 2019-09-16 RX ADMIN — PROPOFOL 30 MG: 10 INJECTION, EMULSION INTRAVENOUS at 16:28

## 2019-09-16 RX ADMIN — SODIUM CHLORIDE 125 ML/HR: 0.9 INJECTION, SOLUTION INTRAVENOUS at 15:33

## 2019-09-16 RX ADMIN — GABAPENTIN 100 MG: 100 CAPSULE ORAL at 17:15

## 2019-09-16 NOTE — ASSESSMENT & PLAN NOTE
Hypokalemia has been repleted with IV fluids    Results from last 7 days   Lab Units 09/16/19  0554 09/15/19  0653 09/14/19  0420 09/13/19  1326   POTASSIUM mmol/L 3 2* 4 0 3 3* 2 9*

## 2019-09-16 NOTE — SOCIAL WORK
CM s/w pt at bedside  The pt lives in [place] w/his brother, sister-in-law, and niece, in a 3 level home w/basement  There are 3 TIFF  The pt has a hx of VNA, and cannot recall the agency  The pt has a hx of SNF ad stayed in [facility]  The pt is independent with ADLS and does not use DME  The pt does not drive and stated that he walks and that family provides transport as needed  The pt receives income through [employer]  The pt's PCP is Morris Unger  The pt uses [address] in [place], has an active Rx plan, and no difficulty affording meds  The pt' denied a hx of D & A abuse or MH issues  The pt does not have POA/LW/AD and declined additional information  The pt's  is his brother Miguel Angel Marquez ([phone])  The pt's family will provide transport at DC  CM will continue to f/u w/DC planning needs

## 2019-09-16 NOTE — PHYSICAL THERAPY NOTE
Physical Therapy Cancellation Note    PT ORDERS RECEIVED, CHART REVIEWED  PATIENT SCHEDULED FOR ENDOSCOPY TODAY  PT WILL CONTINUE TO FOLLOW AND EVALUATE POST PROCEDURE APPROPRIATE      Fanny Ruggiero PT, DPT

## 2019-09-16 NOTE — ASSESSMENT & PLAN NOTE
Iron deficiency anemia ordered venofer x3 doses  Most likely blood loss anemia    Status post upper endoscopy and colonoscopy today    Results from last 7 days   Lab Units 09/16/19  0554 09/15/19  0653 09/14/19  0421 09/13/19  1326   HEMOGLOBIN g/dL 7 7* 7 9* 7 4* 8 1*         Results from last 7 days   Lab Units 09/14/19  0421   IRON ug/dL 14*   TIBC ug/dL 428

## 2019-09-16 NOTE — PROGRESS NOTES
Progress Note - [facility] [place] 1965, 49 y o  male MRN: [id number]    Unit/Bed#: E5 -01 Encounter: [id number]    Primary Care Provider: Miguelito Wynne PA-C   Date and time admitted to hospital: 9/13/2019  9:09 PM        * Microcytic anemia  Assessment & Plan  Iron deficiency anemia ordered venofer x3 doses  Most likely blood loss anemia  Status post upper endoscopy and colonoscopy today    Results from last 7 days   Lab Units 09/16/19  0554 09/15/19  0653 09/14/19  0421 09/13/19  1326   HEMOGLOBIN g/dL 7 7* 7 9* 7 4* 8 1*         Results from last 7 days   Lab Units 09/14/19  0421   IRON ug/dL 14*   TIBC ug/dL 428       Alcohol abuse, daily use  Assessment & Plan  Alcohol use six cans of beers a day  Continue thiamine folic acid  Added chlordiazepoxide to prevent withdrawal symptoms    Hypokalemia  Assessment & Plan  Hypokalemia has been repleted with IV fluids    Results from last 7 days   Lab Units 09/16/19  0554 09/15/19  0653 09/14/19  0420 09/13/19  1326   POTASSIUM mmol/L 3 2* 4 0 3 3* 2 9*       Hypomagnesemia  Assessment & Plan  Hypomagnesemia  Continue to replete with magnesium sulfate  Hypophosphatemia has been repleted    Results from last 7 days   Lab Units 09/16/19  0554 09/15/19  0653 09/14/19  0420 09/13/19  1326   MAGNESIUM mg/dL 1 4* 1 4* 1 3* 1 3*   PHOSPHORUS mg/dL  --  2 8  --   --        Hyponatremia  Assessment & Plan  Chronic hyponatremia on salt tablets  Continue IVF but dc KCL    Results from last 7 days   Lab Units 09/16/19  0554 09/15/19  0653 09/14/19  0420 09/13/19  1326   SODIUM mmol/L 130* 132* 135* 130*           VTE Pharmacologic Prophylaxis:   Pharmacologic: Pharmacologic VTE Prophylaxis contraindicated due to Pancytopenia, cirrhosis    Patient Centered Rounds: I have performed bedside rounds with nursing staff today    Discussions with Specialists or Other Care Team Provider:     Education and Discussions with Family / Patient:     Time Spent for Care: 35 mins  More than 50% of total time spent on counseling and coordination of care as described above  Current Length of Stay: 3 day(s)    Current Patient Status: Inpatient   Certification Statement: The patient will continue to require additional inpatient hospital stay due to Monitoring hemoglobin    Discharge Plan:  Most likely tomorrow    Code Status: Level 1 - Full Code      Subjective:   Patient has no complaint, status post upper endoscopy and colonoscopy; awaiting result  Objective:     Vitals:   Temp (24hrs), Av 8 °F (27 7 °C), Min:33 8 °F (1 °C), Max:98 1 °F (36 7 °C)    Temp:  [33 8 °F (1 °C)-98 1 °F (36 7 °C)] 33 8 °F (1 °C)  HR:  [66-79] 75  Resp:  [17-20] 18  BP: (120-153)/(69-93) 133/78  SpO2:  [97 %-100 %] 98 %  There is no height or weight on file to calculate BMI  Input and Output Summary (last 24 hours): Intake/Output Summary (Last 24 hours) at 2019 1817  Last data filed at 2019 1705  Gross per 24 hour   Intake 1173 75 ml   Output    Net 1173 75 ml       Physical Exam:     General appearance: alert, appears stated age and cooperative  Head: Normocephalic, without obvious abnormality, atraumatic  Lungs: clear to auscultation bilaterally  Heart: regular rate and rhythm  Abdomen: soft, non-tender, positive bowel sounds   Back: no skin lesions, erythema, or scars  Extremities: extremities atraumatic, no cyanosis or edema  Neurologic: Grossly normal    Additional Data:     Labs:    Results from last 7 days   Lab Units 19  0554  19  0421   WBC Thousand/uL 4 87   < > 4 74   HEMOGLOBIN g/dL 7 7*   < > 7 4*   HEMATOCRIT % 27 2*   < > 25 1*   PLATELETS Thousands/uL 211   < > 201   NEUTROS PCT %  --   --  64   LYMPHS PCT %  --   --  22   MONOS PCT %  --   --  12   EOS PCT %  --   --  1    < > = values in this interval not displayed       Results from last 7 days   Lab Units 19  0554   SODIUM mmol/L 130*   POTASSIUM mmol/L 3 2*   CHLORIDE mmol/L 96*   CO2 mmol/L 26   BUN mg/dL <1*   CREATININE mg/dL 0 50*   ANION GAP mmol/L 8   CALCIUM mg/dL 8 6   ALBUMIN g/dL 3 3*   TOTAL BILIRUBIN mg/dL 0 53   ALK PHOS U/L 135*   ALT U/L 32   AST U/L 79*   GLUCOSE RANDOM mg/dL 95                           * I Have Reviewed All Lab Data Listed Above  * Additional Pertinent Lab Tests Reviewed: [address] Admission Reviewed    Imaging:    Imaging Reports Reviewed Today Include: images reviewed    Recent Cultures (last 7 days):     Results from last 7 days   Lab Units 09/13/19  1608   BLOOD CULTURE  No Growth at 48 hrs  No Growth at 48 hrs  Last 24 Hours Medication List:     Current Facility-Administered Medications:  albuterol 2 puff Inhalation Q4H PRN Jhoan Wong MD    amitriptyline 25 mg Oral HS PRN Jhoan Wong MD    chlordiazePOXIDE 10 mg Oral Q8H [address] Jhoan Wong MD    cholecalciferol 1,000 Units Oral Daily Jhoan Wong MD    folic acid 1 mg Oral Daily Jhoan Wong MD    gabapentin 100 mg Oral TID Jhoan Wong MD    hydrOXYzine HCL 25 mg Oral Q6H PRN Jhoan Wong MD    iron sucrose 200 mg Intravenous Once per day on Mon Wed Fri Jhoan Wong MD Last Rate: 200 mg (09/16/19 0831)   levETIRAcetam 1,000 mg Oral Q12H [address] Jhoan Wong MD    lisinopril 10 mg Oral Daily Jhoan Wong MD    multivitamin-minerals 1 tablet Oral Daily Jhoan Wong MD    ondansetron 4 mg Intravenous Q6H PRN Jhoan Wong MD    pantoprazole 40 mg Oral BID AC Jhoan Wong MD    sodium chloride 125 mL/hr Intravenous Continuous Jhoan Wong MD Last Rate: 125 mL/hr (09/16/19 1715)   sodium chloride 1 g Oral TID Jhoan Wong MD    thiamine 100 mg Oral Daily Jhoan Wong MD         Today, Patient Was Seen By: Rosario Magallon MD    ** Please Note: Dictation voice to text software may have been used in the creation of this document   **

## 2019-09-16 NOTE — ANESTHESIA PREPROCEDURE EVALUATION
Review of Systems/Medical History  Patient summary reviewed  Chart reviewed      Cardiovascular  Hypertension , Past MI > 6 months,    Pulmonary  Smoker cigarette smoker  , Pneumonia, COPD ,        GI/Hepatic    Liver disease , Hepatitis ,             Endo/Other     GYN       Hematology  Anemia ,     Musculoskeletal       Neurology  Seizures (on keppra) ,     Psychology   Depression ,     Comment: etoh abuse         Physical Exam    Airway    Mallampati score: II  TM Distance: >3 FB  Neck ROM: full     Dental   lower dentures and upper dentures,     Cardiovascular  Rhythm: regular, Rate: normal, Cardiovascular exam normal    Pulmonary  Pulmonary exam normal Breath sounds clear to auscultation,     Other Findings        Anesthesia Plan  ASA Score- 3     Anesthesia Type- IV sedation with anesthesia with ASA Monitors  Additional Monitors:   Airway Plan:         Plan Factors-Patient not instructed to abstain from smoking on day of procedure  Patient did not smoke on day of surgery  Induction- intravenous  Postoperative Plan-     Informed Consent- Anesthetic plan and risks discussed with patient

## 2019-09-16 NOTE — ASSESSMENT & PLAN NOTE
Chronic hyponatremia on salt tablets    Continue IVF but dc KCL    Results from last 7 days   Lab Units 09/16/19  0554 09/15/19  0653 09/14/19  0420 09/13/19  1326   SODIUM mmol/L 130* 132* 135* 130*

## 2019-09-17 VITALS
OXYGEN SATURATION: 100 % | SYSTOLIC BLOOD PRESSURE: 142 MMHG | HEART RATE: 67 BPM | DIASTOLIC BLOOD PRESSURE: 90 MMHG | RESPIRATION RATE: 18 BRPM | TEMPERATURE: 98.3 F

## 2019-09-17 LAB
ANION GAP SERPL CALCULATED.3IONS-SCNC: 10 MMOL/L (ref 4–13)
BASOPHILS # BLD AUTO: 0.05 THOUSANDS/ΜL (ref 0–0.1)
BASOPHILS NFR BLD AUTO: 1 % (ref 0–1)
BUN SERPL-MCNC: 3 MG/DL (ref 5–25)
CALCIUM SERPL-MCNC: 8.5 MG/DL (ref 8.3–10.1)
CHLORIDE SERPL-SCNC: 97 MMOL/L (ref 100–108)
CO2 SERPL-SCNC: 26 MMOL/L (ref 21–32)
CREAT SERPL-MCNC: 0.56 MG/DL (ref 0.6–1.3)
EOSINOPHIL # BLD AUTO: 0.23 THOUSAND/ΜL (ref 0–0.61)
EOSINOPHIL NFR BLD AUTO: 5 % (ref 0–6)
ERYTHROCYTE [DISTWIDTH] IN BLOOD BY AUTOMATED COUNT: 21.5 % (ref 11.6–15.1)
GFR SERPL CREATININE-BSD FRML MDRD: 118 ML/MIN/1.73SQ M
GLUCOSE SERPL-MCNC: 108 MG/DL (ref 65–140)
HCT VFR BLD AUTO: 26 % (ref 36.5–49.3)
HGB BLD-MCNC: 7.6 G/DL (ref 12–17)
IMM GRANULOCYTES # BLD AUTO: 0.02 THOUSAND/UL (ref 0–0.2)
IMM GRANULOCYTES NFR BLD AUTO: 0 % (ref 0–2)
LYMPHOCYTES # BLD AUTO: 1.22 THOUSANDS/ΜL (ref 0.6–4.47)
LYMPHOCYTES NFR BLD AUTO: 27 % (ref 14–44)
MAGNESIUM SERPL-MCNC: 1.1 MG/DL (ref 1.6–2.6)
MCH RBC QN AUTO: 21.2 PG (ref 26.8–34.3)
MCHC RBC AUTO-ENTMCNC: 29.2 G/DL (ref 31.4–37.4)
MCV RBC AUTO: 73 FL (ref 82–98)
MONOCYTES # BLD AUTO: 0.61 THOUSAND/ΜL (ref 0.17–1.22)
MONOCYTES NFR BLD AUTO: 13 % (ref 4–12)
NEUTROPHILS # BLD AUTO: 2.45 THOUSANDS/ΜL (ref 1.85–7.62)
NEUTS SEG NFR BLD AUTO: 54 % (ref 43–75)
NRBC BLD AUTO-RTO: 0 /100 WBCS
PLATELET # BLD AUTO: 200 THOUSANDS/UL (ref 149–390)
PMV BLD AUTO: 9.3 FL (ref 8.9–12.7)
POTASSIUM SERPL-SCNC: 3.1 MMOL/L (ref 3.5–5.3)
RBC # BLD AUTO: 3.58 MILLION/UL (ref 3.88–5.62)
SODIUM SERPL-SCNC: 133 MMOL/L (ref 136–145)
WBC # BLD AUTO: 4.58 THOUSAND/UL (ref 4.31–10.16)

## 2019-09-17 PROCEDURE — 80048 BASIC METABOLIC PNL TOTAL CA: CPT | Performed by: INTERNAL MEDICINE

## 2019-09-17 PROCEDURE — 99239 HOSP IP/OBS DSCHRG MGMT >30: CPT | Performed by: INTERNAL MEDICINE

## 2019-09-17 PROCEDURE — G8979 MOBILITY GOAL STATUS: HCPCS | Performed by: PHYSICAL THERAPIST

## 2019-09-17 PROCEDURE — 97165 OT EVAL LOW COMPLEX 30 MIN: CPT

## 2019-09-17 PROCEDURE — G8987 SELF CARE CURRENT STATUS: HCPCS

## 2019-09-17 PROCEDURE — G8980 MOBILITY D/C STATUS: HCPCS | Performed by: PHYSICAL THERAPIST

## 2019-09-17 PROCEDURE — G8989 SELF CARE D/C STATUS: HCPCS

## 2019-09-17 PROCEDURE — G8988 SELF CARE GOAL STATUS: HCPCS

## 2019-09-17 PROCEDURE — 85025 COMPLETE CBC W/AUTO DIFF WBC: CPT | Performed by: INTERNAL MEDICINE

## 2019-09-17 PROCEDURE — 97163 PT EVAL HIGH COMPLEX 45 MIN: CPT | Performed by: PHYSICAL THERAPIST

## 2019-09-17 PROCEDURE — 83735 ASSAY OF MAGNESIUM: CPT | Performed by: INTERNAL MEDICINE

## 2019-09-17 RX ORDER — PANTOPRAZOLE SODIUM 40 MG/1
40 TABLET, DELAYED RELEASE ORAL
Qty: 60 TABLET | Refills: 0 | Status: SHIPPED | OUTPATIENT
Start: 2019-09-17 | End: 2019-12-12 | Stop reason: SDUPTHER

## 2019-09-17 RX ORDER — POTASSIUM CHLORIDE 20 MEQ/1
40 TABLET, EXTENDED RELEASE ORAL ONCE
Status: COMPLETED | OUTPATIENT
Start: 2019-09-17 | End: 2019-09-17

## 2019-09-17 RX ADMIN — LISINOPRIL 10 MG: 10 TABLET ORAL at 09:08

## 2019-09-17 RX ADMIN — POTASSIUM CHLORIDE 40 MEQ: 1500 TABLET, EXTENDED RELEASE ORAL at 09:11

## 2019-09-17 RX ADMIN — CHLORDIAZEPOXIDE HYDROCHLORIDE 10 MG: 10 CAPSULE ORAL at 05:04

## 2019-09-17 RX ADMIN — GABAPENTIN 100 MG: 100 CAPSULE ORAL at 09:09

## 2019-09-17 RX ADMIN — FOLIC ACID 1 MG: 1 TABLET ORAL at 09:08

## 2019-09-17 RX ADMIN — PANTOPRAZOLE SODIUM 40 MG: 40 TABLET, DELAYED RELEASE ORAL at 05:04

## 2019-09-17 RX ADMIN — HYDROXYZINE HYDROCHLORIDE 25 MG: 25 TABLET ORAL at 03:09

## 2019-09-17 RX ADMIN — LEVETIRACETAM 1000 MG: 500 TABLET, FILM COATED ORAL at 09:09

## 2019-09-17 RX ADMIN — THIAMINE HCL TAB 100 MG 100 MG: 100 TAB at 09:09

## 2019-09-17 RX ADMIN — Medication 1 TABLET: at 09:09

## 2019-09-17 RX ADMIN — VITAMIN D, TAB 1000IU (100/BT) 1000 UNITS: 25 TAB at 09:09

## 2019-09-17 RX ADMIN — SODIUM CHLORIDE TAB 1 GM 1 G: 1 TAB at 09:09

## 2019-09-17 NOTE — OCCUPATIONAL THERAPY NOTE
Occupational Therapy Evaluation      [place] [facility]    9/17/2019    Principal Problem:    Microcytic anemia  Active Problems:    Tobacco abuse    Hyponatremia    Hypomagnesemia    Hypokalemia    Seizure (HCC)    Alcohol abuse, daily use      Past Medical History:   Diagnosis Date    Alcohol abuse     Bowel obstruction (HCC)     Bowel perforation (HCC)     Cardiac disease     Continuous chronic alcoholism ([facility] [address] [facility] ) 10/5/2017    COPD (chronic obstructive pulmonary disease) ([facility] [address] [facility] )     History of shoulder surgery     Right shoulder    History of transfusion     Hx of cervical spine surgery     Hypertension     Incisional hernia 10/8/2017    MI, old     Mitral regurgitation     Psychiatric disorder     Seizures ([facility] [address] )        Past Surgical History:   Procedure Laterality Date    APPENDECTOMY      BACK SURGERY      ESOPHAGOGASTRODUODENOSCOPY N/A 11/28/2016    Procedure: ESOPHAGOGASTRODUODENOSCOPY (EGD); Surgeon: Dayron Trejo MD;  Location:  GI LAB;   Service:     GALLBLADDER SURGERY      LAPAROTOMY N/A 10/25/2016    Procedure: LAPAROTOMY EXPLORATORY;  Surgeon: Tomas Vargas MD;  Location: [facility];  Service:    [address] [place] [address] PINNING FEMORAL NECK FRACTURE      SHOULDER SURGERY Right     SHOULDER SURGERY      SMALL INTESTINE SURGERY      STOMACH SURGERY      bal surgery        09/17/19 0911   Note Type   Note type Eval only   Restrictions/Precautions   Weight Bearing Precautions Per Order No   Pain Assessment   Pain Assessment No/denies pain   Pain Score No Pain   Home Living   Type of Home House   Home Layout Multi-level   Bathroom Shower/Tub Tub/shower unit   Bathroom Toilet Standard   Additional Comments pt states he has a walker, w/c and shower seat available but wasn't using it    Prior Function   Level of [place] Independent with ADLs and functional mobility   Lives With Family  (brother, sister in law and niece)   Receives Help From Family   ADL Assistance Independent   IADLs Independent   Falls in the last 6 months 0   Vocational Part time employment   Comments works part time as a    Lifestyle   Autonomy independent w self care, mobility  usually walks to places or family provides transportation   Reciprocal Relationships supportive family   Psychosocial   Psychosocial (WDL) WDL   Subjective   Subjective "I am doing ok"   ADL   Eating Assistance 7  Independent   Grooming Assistance 7  Independent   UB Bathing Assistance 7  Independent   LB [address] 7  [address] 7  Independent    [address] 7  Independent   Transfers   Sit to Stand 7  Independent   Stand to Sit 7  Independent   Stand pivot 7  Independent   Functional Mobility   Functional Mobility 7  Independent   Balance   Static Sitting Good   Dynamic Sitting Good   Static Standing Good   Dynamic Standing Fair +   Activity Tolerance   Activity Tolerance Patient tolerated treatment well   Nurse Made Aware appropriate to see   RUE Assessment   RUE Assessment WFL  (neck/head forward flexed  pt states old neck fracture)   LUE Assessment   LUE Assessment WFL   Hand Function   Gross Motor Coordination Functional   Fine Motor Coordination Functional   Sensation   Light Touch Partial deficits in the RUE;Partial deficits in the LUE   Additional Comments pt c/o neuropathy, occasionally dropping items from hands   Cognition   Overall Cognitive Status [facility]   Arousal/Participation Alert; Cooperative   Attention Within functional limits   Orientation Level Oriented X4   Memory Within functional limits   Following Commands Follows multistep commands without difficulty   Assessment   Assessment Pt is a 49 y o  male seen for OT evaluation s/p admit to [facility] on 9/13/2019 w/ Microcytic anemia  He [facility]  Transferred here for ongoing evaluation/ consultations   Comorbidities affecting pt's functional performance at time of assessment include: ETOH abuse/daily, hypokalemia, tobacco abuse, seizure, HTN, COPD, hx suicide attempt, hx surgical spine surgery  Personal factors affecting pt at time of IE include:steps to enter environment  Prior to admission, pt was living w family being independent w self care, mobility  Upon evaluation: Pt demonstrates ability to complete self care independently, independent mobility, no loss of balance noted  Pt scored  90 /100 on the barthel index  Based on findings from OT evaluation and functional performance deficits, pt has been identified as a low complexity evaluation  From OT standpoint, recommendation at time of d/c would be home w family support  Ongoing OT needs not identified at this time, DC OT     Goals   Patient Goals to go home   Plan   OT Frequency Eval only   Recommendation   OT Discharge Recommendation Home with family support   OT - OK to Discharge Yes   Barthel Index   Feeding 10   Bathing 5   Grooming Score 5   Dressing Score 10   Bladder Score 10   Bowels Score 10   Toilet Use Score 10   Transfers (Bed/Chair) Score 15   Mobility (Level Surface) Score 15   Stairs Score 0  (did not assess)   Barthel Index Score 90

## 2019-09-17 NOTE — PHYSICAL THERAPY NOTE
Physical Therapy Evaluation      Patient Active Problem List   Diagnosis    Alcohol dependence ([facility] [address] )    Tobacco abuse    HTN, goal below 140/90    Chronic obstructive pulmonary disease ([address] )    H/O suicide attempt    H/O cervical spine surgery    Cholelithiasis    Hyponatremia    Dietary folate deficiency anemia    Ventral hernia without obstruction or gangrene    Hepatomegaly    Hepatic steatosis    Hypomagnesemia    Elevated alkaline phosphatase level    Alcoholic hepatitis without ascites    Vitamin D deficiency    Iron deficiency    Urinary retention    Bladder wall thickening    Generalized weakness    Malnutrition of moderate degree (HCC)    Hypokalemia    Seizures (HCC)    Seizure (HCC)    Continuous chronic alcoholism (HCC)    Incisional hernia    Hx of seizure disorder    Seizure-like activity ([facility] [address] )    Diarrhea    Abscess of right leg    Abnormality of pancreatic duct    Alcohol abuse, daily use    Allergic rhinitis    Microcytic anemia    Abdominal wound dehiscence    Cervical disc disorder with myelopathy    Cervical spinal stenosis    Depression    Left foot drop    Lumbar radiculopathy    Neuropathy involving both lower extremities    Peripheral neuropathy    T6 vertebral fracture (HCC)    Alcoholic cirrhosis of liver without ascites (HCC)    Thrombocytopenia (HCC)    Closed fracture of left olecranon process, initial encounter    Aspiration pneumonia ([facility] [address] )       Past Medical History:   Diagnosis Date    Alcohol abuse     Bowel obstruction (HCC)     Bowel perforation (HCC)     Cardiac disease     Continuous chronic alcoholism ([facility] [address] ) 10/5/2017    COPD (chronic obstructive pulmonary disease) (HCC)     History of shoulder surgery     Right shoulder    History of transfusion     Hx of cervical spine surgery     Hypertension     Incisional hernia 10/8/2017    MI, old     Mitral regurgitation     Psychiatric disorder     Seizures ([facility] [address] ) Past Surgical History:   Procedure Laterality Date    APPENDECTOMY      BACK SURGERY      ESOPHAGOGASTRODUODENOSCOPY N/A 11/28/2016    Procedure: ESOPHAGOGASTRODUODENOSCOPY (EGD); Surgeon: Elton Leal MD;  [facility]; Service:     GALLBLADDER SURGERY      LAPAROTOMY N/A 10/25/2016    Procedure: LAPAROTOMY EXPLORATORY;  Surgeon: Anika Modi MD;  Location: [facility];  Service:    [facility] MOUTH SURGERY      PERCUTANEOUS PINNING FEMORAL NECK FRACTURE      SHOULDER SURGERY Right     SHOULDER SURGERY      SMALL INTESTINE SURGERY      STOMACH SURGERY      bal surgery        09/17/19 0903   Note Type   Note type Eval only   Pain Assessment   Pain Assessment No/denies pain   Home Living   Type of [address] chair   Home Equipment Walker;Cane;Wheelchair-manual   Additional Comments not using   Prior Function   Level of [place] Independent with ADLs and functional mobility   Lives With [employer] Help From Family   ADL Assistance Independent   IADLs Independent   Falls in the last 6 months 0   Comments pt indep without assistive device has dme from prior cervical fx and fixation, appears to have kyphus at distal end of incision   pt works part time as    Restrictions/Precautions   Weight Bearing Precautions Per Order No   General   Family/Caregiver Present No   Cognition   Overall Cognitive Status WFL   Orientation Level Oriented X4   RUE Assessment   RUE Assessment WFL   LUE Assessment   LUE Assessment WFL   RLE Assessment   RLE Assessment WNL   LLE Assessment   LLE Assessment WNL   Coordination   Movements are Fluid and Coordinated 1   Sensation WFL   Transfers   Sit to Stand 7  Independent   Stand to Sit 7  Independent   Stand pivot 7  Independent   Ambulation/Elevation   Gait pattern WNL  (mild widened base)   Gait Assistance 7  Independent   Assistive Device None   Distance 40'  (including side step right and left, retro amb)   Balance   Static Sitting Good   Dynamic Sitting Good   Static Standing Good   Dynamic Standing Fair +   Ambulatory Good   Higher level balance Side stepping   Higher level balance rep range to the L and to the R;5 reps  (good balance)   Endurance Deficit   Endurance Deficit No   Activity Tolerance   Activity Tolerance Patient tolerated treatment well   Nurse Made Aware yes   Assessment   Assessment pt admitted with anemia, referred to PT  pt was indep PTA, no assistive device and working  pt demonstrated indep mobility with good balance  pt did not have any deficits in strength but did have mild deficit in dynamic balance with tendency to widened base  pt also had deficit in spinal alignment with what appears to be a kyphus at approximately T1 producing forward head posture  pt does not need skilled PT and can return home with family      Goals   Patient Goals go home   Recommendation   Recommendation Home with family support   Barthel Index   Feeding 10   Bathing 5   Grooming Score 5   Dressing Score 10   Bladder Score 10   Bowels Score 10   Toilet Use Score 10   Transfers (Bed/Chair) Score 15   Mobility (Level Surface) Score 15   Stairs Score 0   Barthel Index Score 90   History: co - morbidities, inaccessible home, fall risk (3 prior)  Exam: impairments in locomotion, musculoskeletal, balance,posture, joint integrity,   Clinical: unstable/unpredictable  Complexity:high      [place], PT

## 2019-09-17 NOTE — DISCHARGE SUMMARY
Discharge- Rigoberto Draft 1965, 49 y o  male MRN: [id number]    Unit/Bed#: E5 -01 Encounter: [id number]    Primary Care Provider: Ana Salinas PA-C   Date and time admitted to hospital: 9/13/2019  9:09 PM        * Microcytic anemia  Assessment & Plan  Patient admitted for possible blood loss anemia  EGD and colonoscopy done and showed Clean based duodenal ulcer this is likely at the site of previous surgery/[place]'s patch  Random biopsy taken from duodenum to rule out celiac disease  Nonerosive gastritis biopsy taken to rule out H pylori infection  Normal esophagus  IV venofer 3x weekly started while inpatient; patient needs to follow up with hematology and PCP with this treatment  Patient's blood loss anemia probably related to gastric ulcer which is not now actively bleeding  May need capsule small bowel study to further detail the work up  Patient is now stable and can be discharged home with outpatient follow up  Results from last 7 days   Lab Units 09/17/19  0509 09/16/19  0554 09/15/19  0653 09/14/19  0421 09/13/19  1326   HEMOGLOBIN g/dL 7 6* 7 7* 7 9* 7 4* 8 1*         Results from last 7 days   Lab Units 09/14/19  0421   IRON ug/dL 14*   TIBC ug/dL 428           Discharging Physician / Practitioner: Rosario Magallon MD  PCP: Ana Salinas PA-C  Admission Date:   Admission Orders (From admission, onward)     Ordered        09/13/19 2236  Inpatient Admission  Once                   Discharge Date: 09/17/19    Disposition:      Other: home    For Discharges to   [facility]:   · Not Applicable to this Patient - Not Applicable to this Patient    Reason for Admission: Acute blood loss anemia    Discharge Diagnoses:     Please see assessment and plan section above for further details regarding discharge diagnoses       Resolved Problems  Date Reviewed: 9/16/2019    None          Consultations During Hospital Stay:  [address] TO CASE MANAGEMENT Procedures Performed:   * No surgery found *      Xr Chest 2 Views    Result Date: 9/13/2019  Narrative: CHEST INDICATION:   chest pain  COMPARISON:  1/21/2019 EXAM PERFORMED/VIEWS:  XR CHEST PA & LATERAL FINDINGS: Cardiomediastinal silhouette appears unremarkable  The lungs are clear  No pneumothorax or pleural effusion  Osseous structures appear within normal limits for patient age  Impression: No acute cardiopulmonary disease  Workstation performed: [id number]     Ct Head Without Contrast    Result Date: 9/13/2019  Narrative: CT BRAIN - WITHOUT CONTRAST INDICATION:   Confusion/delirium, altered LOC, unexplained  COMPARISON:  5/22/2018 TECHNIQUE:  CT examination of the brain was performed  In addition to axial images, coronal 2D reformatted images were created and submitted for interpretation  Radiation dose length product (DLP) for this visit:  926 28 mGy-cm   This examination, like all CT scans performed in the [facility], was performed utilizing techniques to minimize radiation dose exposure, including the use of iterative  reconstruction and automated exposure control  IMAGE QUALITY:  Diagnostic  FINDINGS: PARENCHYMA: Decreased attenuation is noted in periventricular and subcortical white matter demonstrating an appearance that is statistically most likely to represent mild microangiopathic change  No CT signs of acute infarction  No intracranial mass, mass effect or midline shift  No acute parenchymal hemorrhage  VENTRICLES AND EXTRA-AXIAL SPACES:  Normal for the patient's age  VISUALIZED ORBITS AND PARANASAL SINUSES:  Unremarkable  CALVARIUM AND EXTRACRANIAL SOFT TISSUES:  Normal      Impression: No acute intracranial abnormality   Workstation performed: [id number]     Vas Lower Limb Venous Duplex Study, Complete Bilateral    Result Date: 9/16/2019  Narrative:  [facility] REPORT CLINICAL: Indications: Patient presents with bilateral lower extremity edema, left greater than right  Operative History: Cervical spine surgery Risk Factors The patient has history of HTN, COPD, current smoking, and CAD  He has no history of DVT or Recent Trauma  FINDINGS:  Segment  Right            Left              Impression       Impression       CFV      Normal (Patent)  Normal (Patent)     CONCLUSION:  Impression: RIGHT LOWER LIMB: No evidence of acute or chronic deep vein thrombosis  No evidence of superficial thrombophlebitis noted  Doppler evaluation shows a normal response to augmentation maneuvers  Popliteal, posterior tibial and anterior tibial arterial Doppler waveforms are triphasic  LEFT LOWER LIMB: No evidence of acute or chronic deep vein thrombosis  No evidence of superficial thrombophlebitis noted  Doppler evaluation shows a normal response to augmentation maneuvers  Popliteal, posterior tibial and anterior tibial arterial Doppler waveforms are triphasic  SIGNATURE: Electronically Signed by: Kendra Schumacher on 2019-09-16 07:28:27 PM       Medication Adjustments and Discharge Medications:  · Summary of Medication Adjustments made as a result of this hospitalization: IV Venofer 3x weekly, Protonix 40 mg twice daily  · Medication Dosing Tapers - Please refer to Discharge Medication List for details on any medication dosing tapers (if applicable to patient)  · Discharge Medication List: See after visit summary for reconciled discharge medications  Wound Care Recommendations:  When applicable, please see wound care section of After Visit Summary  Diet Recommendations at Discharge:  Diet -        Diet Orders   (From admission, onward)             Start     Ordered    09/16/19 1711  Diet Regular; Regular House  Diet effective now     Question Answer Comment   Diet Type Regular    Regular Regular House    RD to adjust diet per protocol?  Yes        09/16/19 1710    09/14/19 0840  Room Service  Once     Question:  Type of Service  Answer:  Room Service-Appropriate 09/14/19 0839                  Incidental Findings:   · none     Test Results Pending at Discharge (will require follow up):   · none         Hospital Course:     · Maicol Stacy is a 49 y o  male patient who originally presented to the hospital on 9/13/2019 due to lab evidence of anemia with suspected acute blood loss anemia  EGD and Colonoscopy done and showed clean based duodenal ulcer this is likely at the site of previous surgery/[place]'s patch  Random biopsy taken from duodenum to rule out celiac disease  Nonerosive gastritis biopsy taken to rule out H pylori infection, Normal esophagus  Colonoscopy showed One 20 mm flat polyp in the ascending colon; removed by hot snare and retrieved specimen; injected with 5 mL of ORISE; placed 3 clips successfully  ORISE was injected to lift the polyp  Small and internal hemorrhoids  Venofer 3x weekly started while inpatient and plan to continue as outpatient with Hematology and PCP follow up  Protonix 40 mg twice daily started and continue with GI follow up  On day of discharge patient comfortably lying in bed with no compliant, Hgb 7 6  Patient is clinically stable for discharge  Condition at Discharge: stable     Discharge Day Visit / Exam:     Subjective:  No compliants     Vitals: Blood Pressure: 142/90 (09/17/19 0756)  Pulse: 67 (09/17/19 0756)  Temperature: 98 3 °F (36 8 °C) (09/17/19 0756)  Temp Source: Tympanic (09/17/19 0756)  Respirations: 18 (09/17/19 0756)  SpO2: 100 % (09/17/19 0756)  Exam:     General appearance: alert, appears stated age and cooperative  Head: Normocephalic, without obvious abnormality, atraumatic  Lungs: clear to auscultation bilaterally  Heart: regular rate and rhythm  Abdomen: soft, non-tender, positive bowel sounds   Back: no skin lesions, erythema, or scars  Extremities: extremities atraumatic, no cyanosis or edema  Neurologic: Grossly normal      Discharge instructions/Information to patient and family:   See after visit summary section titled Discharge Instructions for information provided to patient and family  Planned Readmission: no      Discharge Statement:  I spent 35 minutes discharging the patient  This time was spent on the day of discharge  I had direct contact with the patient on the day of discharge  Greater than 50% of the total time was spent examining patient, answering all patient questions, arranging and discussing plan of care with patient as well as directly providing post-discharge instructions  Additional time then spent on discharge activities      ** Please Note: This note has been constructed using a voice recognition system **

## 2019-09-17 NOTE — PLAN OF CARE
Problem: PAIN - ADULT  Goal: Verbalizes/displays adequate comfort level or baseline comfort level  Description  Interventions:  - Encourage patient to monitor pain and request assistance  - Assess pain using appropriate pain scale  - Administer analgesics based on type and severity of pain and evaluate response  - Implement non-pharmacological measures as appropriate and evaluate response  - Consider cultural and social influences on pain and pain management  - Notify physician/advanced practitioner if interventions unsuccessful or patient reports new pain  Outcome: Progressing     Problem: INFECTION - ADULT  Goal: Absence or prevention of progression during hospitalization  Description  INTERVENTIONS:  - Assess and monitor for signs and symptoms of infection  - Monitor lab/diagnostic results  - Monitor all insertion sites, i e  indwelling lines, tubes, and drains  - Monitor endotracheal if appropriate and nasal secretions for changes in amount and color  - [place] appropriate cooling/warming therapies per order  - Administer medications as ordered  - Instruct and encourage patient and family to use good hand hygiene technique  - Identify and instruct in appropriate isolation precautions for identified infection/condition  Outcome: Progressing  Goal: Absence of fever/infection during neutropenic period  Description  INTERVENTIONS:  - Monitor WBC    Outcome: Progressing     Problem: SAFETY ADULT  Goal: Patient will remain free of falls  Description  INTERVENTIONS:  - Assess patient frequently for physical needs  -  Identify cognitive and physical deficits and behaviors that affect risk of falls    -  [place] fall precautions as indicated by assessment   - Educate patient/family on patient safety including physical limitations  - Instruct patient to call for assistance with activity based on assessment  - Modify environment to reduce risk of injury  - Consider OT/PT consult to assist with strengthening/mobility  Outcome: Progressing  Goal: Maintain or return to baseline ADL function  Description  INTERVENTIONS:  -  Assess patient's ability to carry out ADLs; assess patient's baseline for ADL function and identify physical deficits which impact ability to perform ADLs (bathing, care of mouth/teeth, toileting, grooming, dressing, etc )  - Assess/evaluate cause of self-care deficits   - Assess range of motion  - Assess patient's mobility; develop plan if impaired  - Assess patient's need for assistive devices and provide as appropriate  - Encourage maximum independence but intervene and supervise when necessary  - Involve family in performance of ADLs  - Assess for home care needs following discharge   - Consider OT consult to assist with ADL evaluation and planning for discharge  - Provide patient education as appropriate  Outcome: Progressing  Goal: Maintain or return mobility status to optimal level  Description  INTERVENTIONS:  - Assess patient's baseline mobility status (ambulation, transfers, stairs, etc )    - Identify cognitive and physical deficits and behaviors that affect mobility  - Identify mobility aids required to assist with transfers and/or ambulation (gait belt, sit-to-stand, lift, walker, cane, etc )  - [place] fall precautions as indicated by assessment  - Record patient progress and toleration of activity level on Mobility SBAR; progress patient to next Phase/Stage  - Instruct patient to call for assistance with activity based on assessment  - Consider rehabilitation consult to assist with strengthening/weightbearing, etc   Outcome: Progressing     Problem: DISCHARGE PLANNING  Goal: Discharge to home or other facility with appropriate resources  Description  INTERVENTIONS:  - Identify barriers to discharge w/patient and caregiver  - Arrange for needed discharge resources and transportation as appropriate  - Identify discharge learning needs (meds, wound care, etc )  - Arrange for interpretive services to assist at discharge as needed  - Refer to Case Management Department for coordinating discharge planning if the patient needs post-hospital services based on physician/advanced practitioner order or complex needs related to functional status, cognitive ability, or social support system  Outcome: Progressing     Problem: Knowledge Deficit  Goal: Patient/family/caregiver demonstrates understanding of disease process, treatment plan, medications, and discharge instructions  Description  Complete learning assessment and assess knowledge base  Interventions:  - Provide teaching at level of understanding  - Provide teaching via preferred learning methods  Outcome: Progressing     Problem: Potential for Falls  Goal: Patient will remain free of falls  Description  INTERVENTIONS:  - Assess patient frequently for physical needs  -  Identify cognitive and physical deficits and behaviors that affect risk of falls    -  [place] fall precautions as indicated by assessment   - Educate patient/family on patient safety including physical limitations  - Instruct patient to call for assistance with activity based on assessment  - Modify environment to reduce risk of injury  - Consider OT/PT consult to assist with strengthening/mobility  Outcome: Progressing     Problem: METABOLIC, FLUID AND ELECTROLYTES - ADULT  Goal: Electrolytes maintained within normal limits  Description  INTERVENTIONS:  - Monitor labs and assess patient for signs and symptoms of electrolyte imbalances  - Administer electrolyte replacement as ordered  - Monitor response to electrolyte replacements, including repeat lab results as appropriate  - Instruct patient on fluid and nutrition as appropriate  Outcome: Progressing  Goal: Fluid balance maintained  Description  INTERVENTIONS:  - Monitor labs   - Monitor I/O and WT  - Instruct patient on fluid and nutrition as appropriate  - Assess for signs & symptoms of volume excess or deficit  Outcome: Progressing  Goal: Glucose maintained within target range  Description  INTERVENTIONS:  - Monitor Blood Glucose as ordered  - Assess for signs and symptoms of hyperglycemia and hypoglycemia  - Administer ordered medications to maintain glucose within target range  - Assess nutritional intake and initiate nutrition service referral as needed  Outcome: Progressing     Problem: HEMATOLOGIC - ADULT  Goal: Maintains hematologic stability  Description  INTERVENTIONS  - Assess for signs and symptoms of bleeding or hemorrhage  - Monitor labs  - Administer supportive blood products/factors as ordered and appropriate  Outcome: Progressing

## 2019-09-17 NOTE — ANESTHESIA POSTPROCEDURE EVALUATION
Post-Op Assessment Note    CV Status:  Stable    Pain management: adequate     Mental Status:  Alert and awake   Hydration Status:  Euvolemic   PONV Controlled:  Controlled   Airway Patency:  Patent   Post Op Vitals Reviewed:  Yes              BP      Temp     Pulse     Resp      SpO2

## 2019-09-17 NOTE — NURSING NOTE
Reviewed discharge instructions with patient  Discussed home meds and follow ups  Patient demonstrated understanding  Will continue to monitor until discharge

## 2019-09-17 NOTE — ASSESSMENT & PLAN NOTE
Patient admitted for possible blood loss anemia  EGD and colonoscopy done and showed Clean based duodenal ulcer this is likely at the site of previous surgery/Rich[place]'s patch  Random biopsy taken from duodenum to rule out celiac disease  Nonerosive gastritis biopsy taken to rule out H pylori infection  Normal esophagus  IV venofer 3x weekly started while inpatient; patient needs to follow up with hematology and PCP with this treatment  Patient's blood loss anemia probably related to gastric ulcer which is not now actively bleeding  May need capsule small bowel study to further detail the work up  Patient is now stable and can be discharged home with outpatient follow up      Results from last 7 days   Lab Units 09/17/19  0509 09/16/19  0554 09/15/19  0653 09/14/19  0421 09/13/19  1326   HEMOGLOBIN g/dL 7 6* 7 7* 7 9* 7 4* 8 1*         Results from last 7 days   Lab Units 09/14/19  0421   IRON ug/dL 14*   TIBC ug/dL 428

## 2019-09-17 NOTE — CASE MANAGEMENT
CM s/w pt at bedside  The pt stated that he did not have transport home and no money  CM asked the pt if he was comfortable taking a LYFT, the pt stated yes  The pt signed a waiver, and CM put it in the pt's chart  BURKE s/w Dana [place] at [facility] to set up [address] transport for the pt  P/U is at 1:30pm   CM notified nursing

## 2019-09-18 ENCOUNTER — TELEPHONE (OUTPATIENT)
Dept: GASTROENTEROLOGY | Facility: HOSPITAL | Age: 54
End: 2019-09-18

## 2019-09-18 LAB
BACTERIA BLD CULT: NORMAL
BACTERIA BLD CULT: NORMAL

## 2019-09-18 NOTE — TELEPHONE ENCOUNTER
Left message on machine for patient to contact our office to schedule   ----- Message from Shantal Paredes PA-C sent at 9/17/2019  5:33 PM EDT -----  Please call pt to schedule office visit to follow up after hospital stay, he should have colonoscopy/EGD in 3-6 months also

## 2019-09-20 ENCOUNTER — TELEPHONE (OUTPATIENT)
Dept: SURGICAL ONCOLOGY | Facility: CLINIC | Age: 54
End: 2019-09-20

## 2019-09-20 DIAGNOSIS — I10 ESSENTIAL HYPERTENSION, BENIGN: ICD-10-CM

## 2019-09-20 DIAGNOSIS — E53.8 FOLIC ACID DEFICIENCY: ICD-10-CM

## 2019-09-20 DIAGNOSIS — F10.10 ALCOHOL ABUSE: ICD-10-CM

## 2019-09-20 RX ORDER — LISINOPRIL 10 MG/1
TABLET ORAL
Qty: 30 TABLET | Refills: 5 | Status: SHIPPED | OUTPATIENT
Start: 2019-09-20 | End: 2019-09-20 | Stop reason: SDUPTHER

## 2019-09-20 RX ORDER — LISINOPRIL 10 MG/1
10 TABLET ORAL DAILY
Qty: 30 TABLET | Refills: 5 | Status: SHIPPED | OUTPATIENT
Start: 2019-09-20

## 2019-09-20 RX ORDER — LANOLIN ALCOHOL/MO/W.PET/CERES
100 CREAM (GRAM) TOPICAL DAILY
Qty: 30 TABLET | Refills: 3 | Status: SHIPPED | OUTPATIENT
Start: 2019-09-20 | End: 2019-11-04 | Stop reason: SDUPTHER

## 2019-09-20 RX ORDER — FOLIC ACID 1 MG/1
1 TABLET ORAL DAILY
Qty: 30 TABLET | Refills: 5 | Status: SHIPPED | OUTPATIENT
Start: 2019-09-20 | End: 2019-11-04 | Stop reason: SDUPTHER

## 2019-09-20 NOTE — TELEPHONE ENCOUNTER
New Patient Encounter    New Patient Intake Form   Patient Details:  Skip Barclay  1965  [id number]    Background Information:  [id number] starts by opening a telephone encounter and gathering the following information   Who is calling to schedule? If not self, relationship to patient? self   Referring Provider Hospital discharge   What is the diagnosis? anemia   When was the diagnosis? 9/2019   Is patient aware of diagnosis? Yes   Reason for visit? Hospital F/U   Have you had any testing done? If so: when, where? Yes   Are records in [place]? yes   Was the patient told to bring a disk? no   Scheduling Information:   Preferred [place]:  [place]     Requesting Specific Provider? no   Are there any dates/time the patient cannot be seen? no   Counseling Pre-Screen:  If the patient answers YES to any of the below questions, please route to the appropriate location specific counselor    Have you felt anxious or worried about cancer and the treatment you are receiving? No   Has your diagnosis caused physical, emotional, or financial hardship for you? No   Note: Do not ask the patient about transportation issues/needs  Please notate if the patient brings it up and the counselor will schedule accordingly  Miscellaneous: n/a   After completing the above information, please route to Financial Counselor and the appropriate Nurse Navigator for review

## 2019-09-24 ENCOUNTER — TELEPHONE (OUTPATIENT)
Dept: GASTROENTEROLOGY | Facility: AMBULARY SURGERY CENTER | Age: 54
End: 2019-09-24

## 2019-09-27 ENCOUNTER — OFFICE VISIT (OUTPATIENT)
Dept: FAMILY MEDICINE CLINIC | Facility: CLINIC | Age: 54
End: 2019-09-27
Payer: COMMERCIAL

## 2019-09-27 VITALS
SYSTOLIC BLOOD PRESSURE: 142 MMHG | BODY MASS INDEX: 21.5 KG/M2 | HEART RATE: 111 BPM | HEIGHT: 67 IN | RESPIRATION RATE: 16 BRPM | OXYGEN SATURATION: 96 % | DIASTOLIC BLOOD PRESSURE: 82 MMHG | WEIGHT: 137 LBS | TEMPERATURE: 97.8 F

## 2019-09-27 DIAGNOSIS — R60.0 BILATERAL LEG EDEMA: Primary | ICD-10-CM

## 2019-09-27 PROCEDURE — T1015 CLINIC SERVICE: HCPCS | Performed by: FAMILY MEDICINE

## 2019-09-27 RX ORDER — FUROSEMIDE 20 MG/1
20 TABLET ORAL DAILY
Qty: 30 TABLET | Refills: 1 | Status: SHIPPED | OUTPATIENT
Start: 2019-09-27 | End: 2020-08-29 | Stop reason: HOSPADM

## 2019-09-27 NOTE — PROGRESS NOTES
Assessment/Plan     Diagnoses and all orders for this visit:    Bilateral leg edema  -     furosemide (LASIX) 20 mg tablet; Take 1 tablet (20 mg total) by mouth daily  -     Echo complete with contrast if indicated; Future  -     Compression Stocking    -complicated in setting of liver disease  - Prescription for Lasix 20 mg daily and compression stockings provided to patient today  - will send for echocardiogram to evaluate for possible CHF  -patient in agreement with plan  -follow-up as scheduled  Subjective     Patient Id: Krish Beltran is a 49 y o  male    HPI    Patient with history of alcoholic cirrhosis, COPD, hypertension, tobacco abuse, and depression presenting today with bilateral lower extremity edema  Recent labs showed hypokalemia and hypomagnesemia as well as hyponatremia  His also found to be persistently anemic however this seems to be chronic  Renal function was stable  Per chart review, last echocardiogram in July of 2017 showed an EF of 60%  Patient reports no shortness of breath or difficulty breathing at this time  Edema extends from the feet to the mid calf  No other complaints at this time  Review of Systems   Constitutional: Negative for chills and fever  HENT: Negative for congestion  Respiratory: Negative for shortness of breath  Cardiovascular: Positive for leg swelling  Negative for chest pain  Gastrointestinal: Negative for abdominal pain, constipation, diarrhea, nausea and vomiting  Genitourinary: Negative for dysuria  Musculoskeletal: Negative for arthralgias and myalgias  Skin: Negative for rash  Neurological: Negative for headaches  Psychiatric/Behavioral: Negative for dysphoric mood  All other systems reviewed and are negative        Past Medical History:   Diagnosis Date    Alcohol abuse     Bowel obstruction (HCC)     Bowel perforation (HCC)     Cardiac disease     Continuous chronic alcoholism ([facility] [address] ) 10/5/2017    COPD [facility]chronic obstructive [facility] disease) [facility] [address] [facility]     History of shoulder surgery     Right shoulder    History of transfusion     Hx of cervical spine surgery     Hypertension     Incisional hernia 10/8/2017    MI, old     Mitral regurgitation     Psychiatric disorder     [facility]        Past Surgical History:   Procedure Laterality Date    APPENDECTOMY      BACK SURGERY      ESOPHAGOGASTRODUODENOSCOPY N/A 11/28/2016    Procedure: ESOPHAGOGASTRODUODENOSCOPY (EGD); Surgeon: Jose Orr MD;  Location:  GI LAB;   Service:    [address]      LAPAROTOMY N/A 10/25/2016    Procedure: LAPAROTOMY EXPLORATORY;  Surgeon: Willie Ram MD;  Location: [facility];  Service:    [place] MOUTH SURGERY      PERCUTANEOUS PINNING FEMORAL NECK FRACTURE      SHOULDER SURGERY Right     SHOULDER SURGERY      SMALL INTESTINE SURGERY      STOMACH SURGERY      bal surgery       Family History   Problem Relation Age of Onset    Breast cancer Mother     Prostate cancer Father     Skin cancer Brother        Allergies   Allergen Reactions    Cholestatin          Current Outpatient Medications:     acetaminophen (TYLENOL) 325 mg tablet, Take 2 tablets every 6 hours as needed, Disp: 30 tablet, Rfl: 0    albuterol (2 5 mg/3 mL) 0 083 % nebulizer solution, Take 1 vial (2 5 mg total) by nebulization every 6 (six) hours as needed for wheezing or shortness of breath, Disp: 100 vial, Rfl: 3    albuterol (VENTOLIN HFA) 90 mcg/act inhaler, Inhale 2 puffs every 4 (four) hours as needed for wheezing or shortness of breath, Disp: 1 Inhaler, Rfl: 3    amitriptyline (ELAVIL) 25 mg tablet, Take 1 tablet (25 mg total) by mouth daily at bedtime as needed for sleep, Disp: 30 tablet, Rfl: 3    cholecalciferol (VITAMIN D3) 1,000 units tablet, Take 1 tablet (1,000 Units total) by mouth daily, Disp: 30 tablet, Rfl: 5    cyanocobalamin (VITAMIN B-12) 100 mcg tablet, Take 1 tablet (100 mcg total) by mouth daily, Disp: 30 tablet, Rfl: 5   folic acid (FOLVITE) 1 mg tablet, Take 1 tablet (1 mg total) by mouth daily, Disp: 30 tablet, Rfl: 5    gabapentin (NEURONTIN) 100 mg capsule, TAKE 1 CAPSULE BY MOUTH THREE TIMES DAILY, Disp: 90 capsule, Rfl: 5    levETIRAcetam (KEPPRA) 1000 MG tablet, TAKE 1 TABLET BY MOUTH EVERY 12 HOURS, Disp: 60 tablet, Rfl: 5    lisinopril (ZESTRIL) 10 mg tablet, Take 1 tablet (10 mg total) by mouth daily, Disp: 30 tablet, Rfl: 5    magnesium oxide (MAG-OX) 400 mg, Take 2 tablets (800 mg total) by mouth 3 (three) times a day, Disp: 180 tablet, Rfl: 3    Multiple Vitamin (TAB-A-BONI PO), Take 1 tablet by mouth daily, Disp: , Rfl:     naproxen (NAPROSYN) 500 mg tablet, Take 1 tablet (500 mg total) by mouth 2 (two) times a day with meals, Disp: 30 tablet, Rfl: 0    pantoprazole (PROTONIX) 40 mg tablet, Take 1 tablet (40 mg total) by mouth 2 (two) times a day before meals, Disp: 60 tablet, Rfl: 0    sodium chloride 1 g tablet, Take 1 tablet (1 g total) by mouth 3 (three) times a day, Disp: 90 tablet, Rfl: 3    thiamine 100 MG tablet, Take 1 tablet (100 mg total) by mouth daily, Disp: 30 tablet, Rfl: 3    furosemide (LASIX) 20 mg tablet, Take 1 tablet (20 mg total) by mouth daily, Disp: 30 tablet, Rfl: 1    Objective     Vitals:    09/27/19 1336   BP: 142/82   BP Location: Right arm   Patient Position: Sitting   Cuff Size: Standard   Pulse: (!) 111   Resp: 16   Temp: 97 8 °F (36 6 °C)   TempSrc: Tympanic   SpO2: 96%   Weight: 62 1 kg (137 lb)   Height: 5' 7" (1 702 m)       Physical Exam   Constitutional: He is oriented to person, place, and time  He appears well-developed and well-nourished  HENT:   Head: Normocephalic and atraumatic  Eyes: EOM are normal    Neck: Neck supple  No JVD present  No thyromegaly present  Cardiovascular: Normal rate, regular rhythm, normal heart sounds and intact distal pulses  Exam reveals no gallop and no friction rub  No murmur heard    Pulmonary/Chest: Effort normal and breath sounds normal  No respiratory distress  He has no wheezes  He has no rales  Abdominal: Soft  There is no tenderness  Musculoskeletal: He exhibits edema  1+ pitting edema in the bilateral lower extremities extending from the feet to the mid calf   Neurological: He is alert and oriented to person, place, and time  Skin: Skin is warm  No rash noted  Psychiatric: He has a normal mood and affect  His behavior is normal    Vitals reviewed        Diogenes Hastings

## 2019-09-27 NOTE — PATIENT INSTRUCTIONS
Edema   WHAT YOU NEED TO KNOW:   What is edema? Edema is swelling throughout your body  Edema is usually a sign that you are retaining fluid  The swelling may be caused by heart failure or kidney, thyroid, or liver disease  It may also be caused by medicines such as antidepressants, blood pressure medicines, or hormones  Sudden swelling around the lips or face may be a sign of a severe allergic reaction  Swelling of an arm or leg may be caused by blockage of your veins  What other signs and symptoms may occur with edema? · Discomfort or tenderness in the swollen areas    · Tight and shiny skin over the swollen areas    · Weight gain  How is edema diagnosed? Your healthcare provider will ask about your symptoms and any other symptoms you have  He may also ask about any medical conditions you have  Your healthcare provider will examine your skin over the swollen areas  He may gently push on the swollen area for a short time to see if this leaves a dimple  He may also order tests to find the cause of your edema  How is edema treated and managed? Treatment for edema depends on the cause  Depending on your medical condition, you may be given medicine to help get rid of extra body fluid  Your healthcare provider may suggest that you do any of the following to help manage edema:  · Elevate  your arms or legs as directed  Raise them above the level of your heart as often as you can  This will help decrease swelling and pain  Prop them on pillows or blankets to keep them elevated comfortably  · Wear pressure stockings as directed  The stockings are tight and put pressure on your legs  This helps to keep fluid from collecting in your legs or ankles  · Limit your salt intake  Salt causes your body to hold water  Ask about any other changes to your diet  · Stay active  Do not stand or sit for long periods of time  Ask your healthcare provider about the best exercise plan for you      · Keep your skin moist using lotion, cream, or ointment  Ask your healthcare provider what to use and how often to use it  When should I contact my healthcare provider? · The swollen area feels cold and is pale or blue in color  · The swollen area feels warm, painful, and is red in color  · You have increased swelling or swelling in other parts of your body  · You have questions or concerns about your condition or care  When should I seek immediate care? · You have shortness of breath at rest, especially when you lie down  · You cough up pink, foamy sputum  · You have chest pain  · Your heartbeat is fast or uneven  CARE AGREEMENT:   You have the right to help plan your care  Learn about your health condition and how it may be treated  Discuss treatment options with your caregivers to decide what care you want to receive  You always have the right to refuse treatment  The above information is an  only  It is not intended as medical advice for individual conditions or treatments  Talk to your doctor, nurse or pharmacist before following any medical regimen to see if it is safe and effective for you  © 2017 [employer]  Information is for End User's use only and may not be sold, redistributed or otherwise used for commercial purposes  All illustrations and images included in CareNotes® are the copyrighted property of [employer]  or [employer]

## 2019-10-22 ENCOUNTER — CONSULT (OUTPATIENT)
Dept: HEMATOLOGY ONCOLOGY | Facility: CLINIC | Age: 54
End: 2019-10-22
Payer: COMMERCIAL

## 2019-10-22 VITALS
DIASTOLIC BLOOD PRESSURE: 72 MMHG | RESPIRATION RATE: 17 BRPM | TEMPERATURE: 97.9 F | BODY MASS INDEX: 21.73 KG/M2 | WEIGHT: 135.2 LBS | SYSTOLIC BLOOD PRESSURE: 138 MMHG | HEIGHT: 66 IN | HEART RATE: 84 BPM | OXYGEN SATURATION: 95 %

## 2019-10-22 DIAGNOSIS — D50.0 IRON DEFICIENCY ANEMIA DUE TO CHRONIC BLOOD LOSS: Primary | ICD-10-CM

## 2019-10-22 PROCEDURE — 99245 OFF/OP CONSLTJ NEW/EST HI 55: CPT | Performed by: INTERNAL MEDICINE

## 2019-10-22 RX ORDER — SODIUM CHLORIDE 9 MG/ML
20 INJECTION, SOLUTION INTRAVENOUS ONCE
Status: CANCELLED | OUTPATIENT
Start: 2019-10-28

## 2019-10-22 NOTE — PROGRESS NOTES
Iggy Harrison  1965  [facility] [place]  [address] [facility] [address]  [phone]    Chief Complaint   Patient presents with    Consult          No history exists  History of Present Illness:  December 2018 patient was found to have perforated appendicitis  He was treated nonsurgically  He has anemia dating back at least to 2015  This has worsened gradually over the past 2-3 years, however  September 2019 patient was admitted for presyncopal episode  Hemoglobin 7 5, iron saturation 3%, ferritin 7  EGD showed a duodenal ulcer  No malignancy noted  Stomach biopsy showed chronic inactive gastritis  Colonoscopy showed a polyp, benign  Duodenal biopsy showed no evidence of malabsorption of enteropathy  CMP sodium 130, potassium 3 2, creatinine 0 5, calcium 8 6, AST 79, ALT 32, alk-phos 135, total protein 7 7, albumin 3 3  He was treated with Venofer  200 mg + 300 mg  Review of Systems   Constitutional: Negative for chills and fever  HENT: Negative for nosebleeds  Eyes: Negative for discharge  Respiratory: Negative for cough and shortness of breath  Cardiovascular: Negative for chest pain  Gastrointestinal: Negative for abdominal pain, constipation and diarrhea  Endocrine: Negative for polydipsia  Genitourinary: Negative for hematuria  Musculoskeletal: Negative for arthralgias  Skin: Negative for color change  Allergic/Immunologic: Negative for immunocompromised state  Neurological: Negative for dizziness and headaches  Hematological: Negative for adenopathy  Psychiatric/Behavioral: Negative for agitation         Patient Active Problem List   Diagnosis    [facility] dependence ([facility] [address] [facility]    Tobacco abuse    HTN, goal below 140/90    Chronic obstructive pulmonary disease ([address] )    H/O suicide attempt    H/O cervical spine surgery    Cholelithiasis    Hyponatremia    Dietary folate deficiency anemia    Ventral hernia without obstruction or gangrene    Hepatomegaly    Hepatic steatosis    Hypomagnesemia    Elevated alkaline phosphatase level    Alcoholic hepatitis without ascites    Vitamin D deficiency    Iron deficiency    Urinary retention    Bladder wall thickening    Generalized weakness    Malnutrition of moderate degree (HCC)    Hypokalemia    Seizures (HCC)    Seizure (HCC)    Continuous chronic alcoholism (HCC)    Incisional hernia    Hx of seizure disorder    Seizure-like activity ([facility] [address] )    Diarrhea    Abscess of right leg    Abnormality of pancreatic duct    Alcohol abuse, daily use    Allergic rhinitis    Microcytic anemia    Abdominal wound dehiscence    Cervical disc disorder with myelopathy    Cervical spinal stenosis    Depression    Left foot drop    Lumbar radiculopathy    Neuropathy involving both lower extremities    Peripheral neuropathy    T6 vertebral fracture (HCC)    Alcoholic cirrhosis of liver without ascites (HCC)    Thrombocytopenia (HCC)    Closed fracture of left olecranon process, initial encounter    Aspiration pneumonia (HCC)     Past Medical History:   Diagnosis Date    Alcohol abuse     Bowel obstruction (HCC)     Bowel perforation (HCC)     Cardiac disease     Continuous chronic alcoholism ([address] ) 10/5/2017    COPD (chronic obstructive pulmonary disease) (HCC)     History of shoulder surgery     Right shoulder    History of transfusion     Hx of cervical spine surgery     Hypertension     Incisional hernia 10/8/2017    MI, old     Mitral regurgitation     Psychiatric disorder     Seizures ([facility] [address] )      Past Surgical History:   Procedure Laterality Date    APPENDECTOMY      BACK SURGERY      ESOPHAGOGASTRODUODENOSCOPY N/A 11/28/2016    Procedure: ESOPHAGOGASTRODUODENOSCOPY (EGD); Surgeon: Aj Bingham MD;  Location: [facility];   Service:     GALLBLADDER SURGERY      LAPAROTOMY N/A 10/25/2016    Procedure: LAPAROTOMY EXPLORATORY; Surgeon: Yoav Oshea MD;  Location: [facility];  Service:    [facility] MOUTH SURGERY      PERCUTANEOUS PINNING FEMORAL NECK FRACTURE      SHOULDER SURGERY Right     SHOULDER SURGERY      SMALL INTESTINE SURGERY      STOMACH SURGERY      bal surgery     Family History   Problem Relation Age of Onset    Breast cancer Mother     Prostate cancer Father     Skin cancer Brother      Social History     Socioeconomic History    Marital status: Single     Spouse name: Not on file    Number of children: Not on file    Years of education: Not on file    Highest education level: Not on file   Occupational History    Not on file   Social Needs    Financial resource strain: Not on file    Food insecurity:     Worry: Not on file     Inability: Not on file    Transportation needs:     Medical: Not on file     Non-medical: Not on file   Tobacco Use    Smoking status: Current Every Day Smoker     Packs/day: 2 00     Years: 15 00     Pack years: 30 00     Types: Cigarettes    Smokeless tobacco: Never Used   Substance and Sexual Activity    Alcohol use:  Yes     Alcohol/week: 6 0 standard drinks     Types: 6 Cans of beer per week     Comment: 6 25oz cans a day    Drug use: No    Sexual activity: Yes   Lifestyle    Physical activity:     Days per week: Not on file     Minutes per session: Not on file    Stress: Not on file   Relationships    Social connections:     Talks on phone: Not on file     Gets together: Not on file     Attends Bahai service: Not on file     Active member of club or organization: Not on file     Attends meetings of clubs or organizations: Not on file     Relationship status: Not on file    Intimate partner violence:     Fear of current or ex partner: Not on file     Emotionally abused: Not on file     Physically abused: Not on file     Forced sexual activity: Not on file   Other Topics Concern    Not on file   Social History Narrative    Not on file       Current Outpatient Medications:   acetaminophen (TYLENOL) 325 mg tablet, Take 2 tablets every 6 hours as needed, Disp: 30 tablet, Rfl: 0    albuterol (2 5 mg/3 mL) 0 083 % nebulizer solution, Take 1 vial (2 5 mg total) by nebulization every 6 (six) hours as needed for wheezing or shortness of breath, Disp: 100 vial, Rfl: 3    albuterol (VENTOLIN HFA) 90 mcg/act inhaler, Inhale 2 puffs every 4 (four) hours as needed for wheezing or shortness of breath, Disp: 1 Inhaler, Rfl: 3    amitriptyline (ELAVIL) 25 mg tablet, Take 1 tablet (25 mg total) by mouth daily at bedtime as needed for sleep, Disp: 30 tablet, Rfl: 3    cholecalciferol (VITAMIN D3) 1,000 units tablet, Take 1 tablet (1,000 Units total) by mouth daily, Disp: 30 tablet, Rfl: 5    cyanocobalamin (VITAMIN B-12) 100 mcg tablet, Take 1 tablet (100 mcg total) by mouth daily, Disp: 30 tablet, Rfl: 5    folic acid (FOLVITE) 1 mg tablet, Take 1 tablet (1 mg total) by mouth daily, Disp: 30 tablet, Rfl: 5    furosemide (LASIX) 20 mg tablet, Take 1 tablet (20 mg total) by mouth daily, Disp: 30 tablet, Rfl: 1    gabapentin (NEURONTIN) 100 mg capsule, TAKE 1 CAPSULE BY MOUTH THREE TIMES DAILY, Disp: 90 capsule, Rfl: 5    levETIRAcetam (KEPPRA) 1000 MG tablet, TAKE 1 TABLET BY MOUTH EVERY 12 HOURS, Disp: 60 tablet, Rfl: 5    lisinopril (ZESTRIL) 10 mg tablet, Take 1 tablet (10 mg total) by mouth daily, Disp: 30 tablet, Rfl: 5    magnesium oxide (MAG-OX) 400 mg, Take 2 tablets (800 mg total) by mouth 3 (three) times a day, Disp: 180 tablet, Rfl: 3    Multiple Vitamin (TAB-A-BONI PO), Take 1 tablet by mouth daily, Disp: , Rfl:     naproxen (NAPROSYN) 500 mg tablet, Take 1 tablet (500 mg total) by mouth 2 (two) times a day with meals, Disp: 30 tablet, Rfl: 0    pantoprazole (PROTONIX) 40 mg tablet, Take 1 tablet (40 mg total) by mouth 2 (two) times a day before meals, Disp: 60 tablet, Rfl: 0    sodium chloride 1 g tablet, Take 1 tablet (1 g total) by mouth 3 (three) times a day, Disp: 90 tablet, Rfl: 3    thiamine 100 MG tablet, Take 1 tablet (100 mg total) by mouth daily, Disp: 30 tablet, Rfl: 3  Allergies   Allergen Reactions    Cholestatin      Vitals:    10/22/19 0911   BP: 138/72   Pulse: 84   Resp: 17   Temp: 97 9 °F (36 6 °C)   SpO2: 95%       Physical Exam   Constitutional: He is oriented to person, place, and time  He appears well-developed  HENT:   Head: Normocephalic  Eyes: Pupils are equal, round, and reactive to light  Neck: Neck supple  Cardiovascular: Normal rate and regular rhythm  No murmur heard  Pulmonary/Chest: Breath sounds normal  He has no wheezes  He has no rales  Abdominal: Soft  There is no tenderness  Musculoskeletal: Normal range of motion  He exhibits no edema or tenderness  Lymphadenopathy:     He has no cervical adenopathy  Neurological: He is alert and oriented to person, place, and time  He has normal reflexes  No cranial nerve deficit  Skin: No rash noted  No erythema  Psychiatric: He has a normal mood and affect  His behavior is normal          Labs:  CBC, Coags, BMP, Mg, Phos    Imaging  No results found  I reviewed the above laboratory and imaging data  Discussion/Summary:  In summary, this is a 26-year-old male history of iron deficiency anemia most likely secondary to duodenal ulcer  He underwent partial iron replacement during his hospitalization  We will arrange for Venofer 300 mg IV x2 doses to complete iron replacement  Oral iron can be discontinued at this time  Repeat blood work in 3 months is requested  If that remains normal, follow-up 6 months later  Discharge thereafter, again, presuming that his studies remain normal   He has follow-up with GI in December 2019  If his hemoglobin and or iron studies decline, further investigation for small bowel pathology would be reasonable to consider

## 2019-10-25 DIAGNOSIS — J44.9 CHRONIC OBSTRUCTIVE PULMONARY DISEASE, UNSPECIFIED COPD TYPE (HCC): ICD-10-CM

## 2019-10-25 RX ORDER — ALBUTEROL SULFATE 90 UG/1
AEROSOL, METERED RESPIRATORY (INHALATION)
Qty: 18 EACH | Refills: 3 | Status: SHIPPED | OUTPATIENT
Start: 2019-10-25 | End: 2020-02-10 | Stop reason: SDUPTHER

## 2019-10-25 RX ORDER — ALBUTEROL SULFATE 90 UG/1
1 AEROSOL, METERED RESPIRATORY (INHALATION) EVERY 6 HOURS PRN
Qty: 18 EACH | Refills: 3 | OUTPATIENT
Start: 2019-10-25

## 2019-10-28 ENCOUNTER — HOSPITAL ENCOUNTER (OUTPATIENT)
Dept: INFUSION CENTER | Facility: HOSPITAL | Age: 54
Discharge: HOME | End: 2019-10-28
Attending: INTERNAL MEDICINE
Payer: COMMERCIAL

## 2019-10-28 VITALS
RESPIRATION RATE: 16 BRPM | OXYGEN SATURATION: 98 % | SYSTOLIC BLOOD PRESSURE: 162 MMHG | TEMPERATURE: 99 F | DIASTOLIC BLOOD PRESSURE: 90 MMHG | HEART RATE: 76 BPM

## 2019-10-28 DIAGNOSIS — D50.0 IRON DEFICIENCY ANEMIA DUE TO CHRONIC BLOOD LOSS: Primary | ICD-10-CM

## 2019-10-28 PROCEDURE — 96365 THER/PROPH/DIAG IV INF INIT: CPT

## 2019-10-28 RX ORDER — SODIUM CHLORIDE 9 MG/ML
20 INJECTION, SOLUTION INTRAVENOUS ONCE
Status: COMPLETED | OUTPATIENT
Start: 2019-10-28 | End: 2019-10-28

## 2019-10-28 RX ORDER — SODIUM CHLORIDE 9 MG/ML
20 INJECTION, SOLUTION INTRAVENOUS ONCE
Status: CANCELLED | OUTPATIENT
Start: 2019-11-08

## 2019-10-28 RX ADMIN — IRON SUCROSE 300 MG: 20 INJECTION, SOLUTION INTRAVENOUS at 12:33

## 2019-10-28 RX ADMIN — SODIUM CHLORIDE 20 ML/HR: 0.9 INJECTION, SOLUTION INTRAVENOUS at 12:22

## 2019-10-30 DIAGNOSIS — M50.10 CERVICAL DISC SYNDROME: ICD-10-CM

## 2019-10-30 RX ORDER — LEVETIRACETAM 1000 MG/1
TABLET ORAL
Qty: 60 TABLET | Refills: 5 | Status: SHIPPED | OUTPATIENT
Start: 2019-10-30 | End: 2020-04-18

## 2019-11-04 DIAGNOSIS — M54.9 DORSALGIA: ICD-10-CM

## 2019-11-04 DIAGNOSIS — F10.10 ALCOHOL ABUSE: ICD-10-CM

## 2019-11-04 DIAGNOSIS — E53.8 FOLIC ACID DEFICIENCY: ICD-10-CM

## 2019-11-04 RX ORDER — GABAPENTIN 100 MG/1
100 CAPSULE ORAL 3 TIMES DAILY
Qty: 90 CAPSULE | Refills: 3 | Status: SHIPPED | OUTPATIENT
Start: 2019-11-04 | End: 2022-05-26

## 2019-11-04 RX ORDER — LANOLIN ALCOHOL/MO/W.PET/CERES
100 CREAM (GRAM) TOPICAL DAILY
Qty: 30 TABLET | Refills: 3 | Status: ON HOLD | OUTPATIENT
Start: 2019-11-04 | End: 2020-08-29 | Stop reason: SDUPTHER

## 2019-11-04 RX ORDER — FOLIC ACID 1 MG/1
1 TABLET ORAL DAILY
Qty: 30 TABLET | Refills: 3 | Status: ON HOLD | OUTPATIENT
Start: 2019-11-04 | End: 2020-08-29 | Stop reason: SDUPTHER

## 2019-11-08 ENCOUNTER — HOSPITAL ENCOUNTER (OUTPATIENT)
Dept: INFUSION CENTER | Facility: HOSPITAL | Age: 54
Discharge: HOME | End: 2019-11-08
Attending: INTERNAL MEDICINE
Payer: COMMERCIAL

## 2019-11-08 VITALS
DIASTOLIC BLOOD PRESSURE: 72 MMHG | TEMPERATURE: 97.1 F | HEART RATE: 78 BPM | RESPIRATION RATE: 16 BRPM | SYSTOLIC BLOOD PRESSURE: 121 MMHG

## 2019-11-08 DIAGNOSIS — D50.0 IRON DEFICIENCY ANEMIA DUE TO CHRONIC BLOOD LOSS: Primary | ICD-10-CM

## 2019-11-08 PROCEDURE — 96365 THER/PROPH/DIAG IV INF INIT: CPT

## 2019-11-08 RX ORDER — SODIUM CHLORIDE 9 MG/ML
20 INJECTION, SOLUTION INTRAVENOUS ONCE
Status: COMPLETED | OUTPATIENT
Start: 2019-11-08 | End: 2019-11-08

## 2019-11-08 RX ORDER — SODIUM CHLORIDE 9 MG/ML
20 INJECTION, SOLUTION INTRAVENOUS ONCE
Status: CANCELLED | OUTPATIENT
Start: 2019-11-11

## 2019-11-08 RX ADMIN — IRON SUCROSE 300 MG: 20 INJECTION, SOLUTION INTRAVENOUS at 09:14

## 2019-11-08 RX ADMIN — SODIUM CHLORIDE 20 ML/HR: 0.9 INJECTION, SOLUTION INTRAVENOUS at 09:00

## 2019-11-12 ENCOUNTER — APPOINTMENT (EMERGENCY)
Dept: RADIOLOGY | Facility: HOSPITAL | Age: 54
End: 2019-11-12
Payer: COMMERCIAL

## 2019-11-12 ENCOUNTER — APPOINTMENT (EMERGENCY)
Dept: CT IMAGING | Facility: HOSPITAL | Age: 54
End: 2019-11-12
Payer: COMMERCIAL

## 2019-11-12 ENCOUNTER — HOSPITAL ENCOUNTER (EMERGENCY)
Facility: HOSPITAL | Age: 54
Discharge: HOME | End: 2019-11-12
Attending: EMERGENCY MEDICINE | Admitting: EMERGENCY MEDICINE
Payer: COMMERCIAL

## 2019-11-12 VITALS
RESPIRATION RATE: 18 BRPM | BODY MASS INDEX: 21.82 KG/M2 | HEART RATE: 102 BPM | OXYGEN SATURATION: 95 % | TEMPERATURE: 97.8 F | SYSTOLIC BLOOD PRESSURE: 129 MMHG | HEIGHT: 66 IN | DIASTOLIC BLOOD PRESSURE: 95 MMHG

## 2019-11-12 DIAGNOSIS — M25.511 RIGHT SHOULDER PAIN: Primary | ICD-10-CM

## 2019-11-12 DIAGNOSIS — J18.9 RML PNEUMONIA: ICD-10-CM

## 2019-11-12 LAB
ATRIAL RATE: 81 BPM
P AXIS: 74 DEGREES
PR INTERVAL: 158 MS
QRS AXIS: -50 DEGREES
QRSD INTERVAL: 96 MS
QT INTERVAL: 384 MS
QTC INTERVAL: 446 MS
T WAVE AXIS: 48 DEGREES
VENTRICULAR RATE: 81 BPM

## 2019-11-12 PROCEDURE — 71046 X-RAY EXAM CHEST 2 VIEWS: CPT

## 2019-11-12 PROCEDURE — 70450 CT HEAD/BRAIN W/O DYE: CPT

## 2019-11-12 PROCEDURE — 93010 ELECTROCARDIOGRAM REPORT: CPT | Performed by: INTERNAL MEDICINE

## 2019-11-12 PROCEDURE — 73030 X-RAY EXAM OF SHOULDER: CPT

## 2019-11-12 PROCEDURE — 99284 EMERGENCY DEPT VISIT MOD MDM: CPT | Performed by: EMERGENCY MEDICINE

## 2019-11-12 PROCEDURE — 99284 EMERGENCY DEPT VISIT MOD MDM: CPT

## 2019-11-12 PROCEDURE — 72125 CT NECK SPINE W/O DYE: CPT

## 2019-11-12 PROCEDURE — 93005 ELECTROCARDIOGRAM TRACING: CPT

## 2019-11-12 RX ORDER — TRAMADOL HYDROCHLORIDE 50 MG/1
50 TABLET ORAL EVERY 6 HOURS PRN
Qty: 20 TABLET | Refills: 0 | Status: ON HOLD | OUTPATIENT
Start: 2019-11-12 | End: 2020-08-20 | Stop reason: ALTCHOICE

## 2019-11-12 RX ORDER — LEVOFLOXACIN 5 MG/ML
750 INJECTION, SOLUTION INTRAVENOUS ONCE
Status: DISCONTINUED | OUTPATIENT
Start: 2019-11-12 | End: 2019-11-12

## 2019-11-12 RX ORDER — TRAMADOL HYDROCHLORIDE 50 MG/1
100 TABLET ORAL ONCE
Status: COMPLETED | OUTPATIENT
Start: 2019-11-12 | End: 2019-11-12

## 2019-11-12 RX ORDER — LEVOFLOXACIN 750 MG/1
750 TABLET ORAL EVERY 24 HOURS
Qty: 4 TABLET | Refills: 0 | Status: SHIPPED | OUTPATIENT
Start: 2019-11-12 | End: 2019-11-16

## 2019-11-12 RX ADMIN — TRAMADOL HYDROCHLORIDE 100 MG: 50 TABLET, FILM COATED ORAL at 08:53

## 2019-11-12 RX ADMIN — LEVOFLOXACIN 750 MG: 500 TABLET, FILM COATED ORAL at 09:01

## 2019-11-12 NOTE — DISCHARGE INSTRUCTIONS
Rt shoulder in sling for comfort as needed  Continue naprosyn or tylenol  Tramadol for breakthru pain  Finish 4 additional days of Levaquin next dose Wednesday

## 2019-11-12 NOTE — ED PROVIDER NOTES
History  Chief Complaint   Patient presents with    Shoulder Injury     right shoulder injury happened a month ago after falling  50 yo RHD male sustained a same level fall onto rt shoulder after he knocked to the ground 1 month ago  He denies hitting his head there was no loss of consciousness he does have a history of prior neck surgery with rods in his neck and he feels that the rods were more prominent on the right side of his neck  He feels his right shoulder is dislocated  And he is finally coming to get it checked out  He states he had a wrist fracture it was casted but that is better he denies any new numbness or paresthesias he states he has neuropathy to the both hands he is not on any blood thinners denies any headache visual disturbance malocclusion he denies any weakness he does has increasing pain with movement of the shoulder the ball of the shoulder hurts and it radiates up the side of his neck  He denies any shortness of breath or pleuritic pain he has no rib pain he is denying any anterior chest pain he has had no diaphoresis no fever chills but he does cough up whitish phlegm on occasion this he attributes this to his smoking he denies any abdominal pain no upper or lower back pain no nausea vomiting no diarrhea no hip pain he has not had any difficulty walking  No lightheadedness; last tetanus 2017          Prior to Admission Medications   Prescriptions Last Dose Informant Patient Reported? Taking?    Multiple Vitamin (TAB-A-BONI PO)  Self Yes No   Sig: Take 1 tablet by mouth daily   acetaminophen (TYLENOL) 325 mg tablet  Self No No   Sig: Take 2 tablets every 6 hours as needed   albuterol (2 5 mg/3 mL) 0 083 % nebulizer solution  Self No No   Sig: Take 1 vial (2 5 mg total) by nebulization every 6 (six) hours as needed for wheezing or shortness of breath   albuterol (PROVENTIL HFA,VENTOLIN HFA) 90 mcg/act inhaler   No No   Sig: INHALE 2 PUFFS BY MOUTH EVERY 4 HOURS AS NEEDED FOR WHEEZING AND FOR SHORTNESS OF BREATH   amitriptyline (ELAVIL) 25 mg tablet  Self No No   Sig: Take 1 tablet (25 mg total) by mouth daily at bedtime as needed for sleep   cholecalciferol (VITAMIN D3) 1,000 units tablet  Self No No   Sig: Take 1 tablet (1,000 Units total) by mouth daily   cyanocobalamin (VITAMIN B-12) 100 mcg tablet  Self No No   Sig: Take 1 tablet (100 mcg total) by mouth daily   folic acid (FOLVITE) 1 mg tablet   No No   Sig: Take 1 tablet (1 mg total) by mouth daily   furosemide (LASIX) 20 mg tablet  Self No No   Sig: Take 1 tablet (20 mg total) by mouth daily   gabapentin (NEURONTIN) 100 mg capsule   No No   Sig: Take 1 capsule (100 mg total) by mouth 3 (three) times a day   levETIRAcetam (KEPPRA) 1000 MG tablet   No No   Sig: TAKE 1 TABLET BY MOUTH EVERY 12 HOURS   lisinopril (ZESTRIL) 10 mg tablet  Self No No   Sig: Take 1 tablet (10 mg total) by mouth daily   magnesium oxide (MAG-OX) 400 mg  Self No No   Sig: Take 2 tablets (800 mg total) by mouth 3 (three) times a day   naproxen (NAPROSYN) 500 mg tablet  Self No No   Sig: Take 1 tablet (500 mg total) by mouth 2 (two) times a day with meals   pantoprazole (PROTONIX) 40 mg tablet  Self No No   Sig: Take 1 tablet (40 mg total) by mouth 2 (two) times a day before meals   sodium chloride 1 g tablet  Self No No   Sig: Take 1 tablet (1 g total) by mouth 3 (three) times a day   thiamine 100 MG tablet   No No   Sig: Take 1 tablet (100 mg total) by mouth daily      Facility-Administered Medications: None       Past Medical History:   Diagnosis Date    Alcohol abuse     Bowel obstruction (HCC)     Bowel perforation (HCC)     Cardiac disease     Continuous chronic alcoholism ([facility] [address] ) 10/5/2017    COPD (chronic obstructive pulmonary disease) (HCC)     History of shoulder surgery     Right shoulder    History of transfusion     Hx of cervical spine surgery     Hypertension     Incisional hernia 10/8/2017    MI, old     Mitral regurgitation     Psychiatric disorder     Seizures [facility])        Past Surgical History:   Procedure Laterality Date    APPENDECTOMY      BACK SURGERY      ESOPHAGOGASTRODUODENOSCOPY N/A 11/28/2016    Procedure: ESOPHAGOGASTRODUODENOSCOPY (EGD); Surgeon: Es Santiago MD;  Location:  GI LAB; Service:     GALLBLADDER SURGERY      LAPAROTOMY N/A 10/25/2016    Procedure: LAPAROTOMY EXPLORATORY;  Surgeon: Nasima Edwards MD;  Location: [facility];  Service:    Mike Bones MOUTH SURGERY      PERCUTANEOUS PINNING FEMORAL NECK FRACTURE      SHOULDER SURGERY Right     SHOULDER SURGERY      SMALL INTESTINE SURGERY      STOMACH SURGERY      bal surgery       Family History   Problem Relation Age of Onset    Breast cancer Mother     Prostate cancer Father     Skin cancer Brother      I have reviewed and agree with the history as documented  Social History     Tobacco Use    Smoking status: Current Every Day Smoker     Packs/day: 1 00     Years: 15 00     Pack years: 15 00     Types: Cigarettes    Smokeless tobacco: Never Used   Substance Use Topics    Alcohol use: Yes     Alcohol/week: 6 0 standard drinks     Types: 6 Cans of beer per week     Comment: 6 12oz cans a day    Drug use: No        Review of Systems   Constitutional: Positive for activity change (difficulty moving rt shoulder)  Negative for appetite change, chills and fever  HENT: Negative for congestion, ear pain, rhinorrhea, sneezing and sore throat  Eyes: Negative for discharge  Respiratory: Positive for cough  Negative for shortness of breath  Cardiovascular: Negative for chest pain and leg swelling  Gastrointestinal: Negative for abdominal pain, blood in stool, diarrhea, nausea and vomiting  Endocrine: Negative for polyuria  Genitourinary: Negative for dysuria, frequency and urgency  Musculoskeletal: Positive for arthralgias (rt shoulder pain) and neck pain (rt sided)  Negative for back pain, myalgias and neck stiffness     Skin: Negative for rash    Neurological: Positive for numbness (chronic neuropathy)  Negative for dizziness, weakness, light-headedness and headaches  Hematological: Negative for adenopathy  Psychiatric/Behavioral: Negative for confusion  All other systems reviewed and are negative  Physical Exam  Physical Exam   Constitutional: He is oriented to person, place, and time  He appears well-developed  No distress  HENT:   Head: Normocephalic and atraumatic  Right Ear: External ear normal    Left Ear: External ear normal    Nose: Nose normal    Mouth/Throat: Oropharynx is clear and moist  No oropharyngeal exudate  TMS pale bilaterally   Eyes: Pupils are equal, round, and reactive to light  Conjunctivae and EOM are normal  Right eye exhibits no discharge  Left eye exhibits no discharge  3 mm equal   Neck: No tracheal deviation present  ROM at baseline neck flexed forward from base   Cardiovascular: Normal rate, regular rhythm and intact distal pulses  80's   Pulmonary/Chest: Effort normal  No stridor  No respiratory distress  He has no wheezes  He has no rales  He exhibits no tenderness  Distant BS   Abdominal: Soft  Bowel sounds are normal  He exhibits no distension and no mass  There is no tenderness  There is no rebound and no guarding  Back no midline T or L spine tenderness no CVA tenderness   Musculoskeletal:        Right shoulder: He exhibits decreased range of motion, tenderness, bony tenderness, swelling, effusion and pain  He exhibits no crepitus, no deformity, no laceration, no spasm, normal pulse and normal strength  Right elbow: Normal        Right upper arm: Normal         Arms:  Lymphadenopathy:     He has no cervical adenopathy  Neurological: He is alert and oriented to person, place, and time  He displays normal reflexes  No cranial nerve deficit or sensory deficit  He exhibits normal muscle tone  Coordination normal    GCS 15   Skin: Skin is warm and dry   Capillary refill takes less than 2 seconds  He is not diaphoretic  Psychiatric: He has a normal mood and affect  Vitals reviewed  Vital Signs  ED Triage Vitals [11/12/19 0548]   Temperature Pulse Respirations Blood Pressure SpO2   97 6 °F (36 4 °C) (!) 108 16 129/95 95 %      Temp Source Heart Rate Source Patient Position - Orthostatic VS BP Location FiO2 (%)   Temporal Monitor Sitting Left arm --      Pain Score       8           Vitals:    11/12/19 0548 11/12/19 0917   BP: 129/95    Pulse: (!) 108 102   Patient Position - Orthostatic VS: Sitting Sitting         Visual Acuity  Visual Acuity      Most Recent Value   L Pupil Size (mm)  3   R Pupil Size (mm)  3          ED Medications  Medications   traMADol (ULTRAM) tablet 100 mg (100 mg Oral Given 11/12/19 0853)   levofloxacin (LEVAQUIN) tablet 750 mg (750 mg Oral Given 11/12/19 0901)       Diagnostic Studies  Results Reviewed     None                 CT head without contrast   Final Result by Spring Lynch MD (11/12 1611)      No acute intracranial abnormality  Workstation performed: [id number]         CT cervical spine without contrast   Final Result by Spring Lynch MD (11/12 8345)      Stable appearance of cervical spine instrumentation as detailed above when comparing to CT examination of 7/28/2017  Stable right T1 articulating screw fracture     Otherwise no evidence of acute fracture or traumatic misalignment  Workstation performed: [id number]         XR shoulder 2+ views RIGHT   ED Interpretation by Caitlin Hanley MD (84/18 2532)   Read by me; Radiologist to provide formal interpretation  No acute fracture      Final Result by Charisse Nair MD (11/12 6266)      No acute osseous abnormality              Workstation performed: [id number]         XR chest 2 views   ED Interpretation by Caitlin Hanley MD (33/23 8207)   Read by me; Radiologist to provide formal interpretation vs prev increased marking rt mid lung field 9/13/19      Final Result by Cale Pena MD (11/12 6343)      Faint medial right lung base opacity not evident previously possibly early infiltrate  Continued follow-up to confirm resolution          Findings are consistent with emergency provider's preliminary reading               Workstation performed: [id number]                    Procedures  ECG 12 Lead Documentation Only  Date/Time: 11/12/2019 6:13 AM  Performed by: Emma Egan MD  Authorized by: Emma Egan MD     Indications / Diagnosis:  Rt shoulder pain  ECG reviewed by me, the ED Provider: yes    Patient location:  ED  Previous ECG:     Previous ECG:  Compared to current    Comparison ECG info:  9/13/19    Similarity:  Changes noted  Rate:     ECG rate:  81    ECG rate assessment: normal    Rhythm:     Rhythm: sinus rhythm    QRS:     QRS axis:  Left  Comments:      LAFB new vs prior; no acute ischemic changes           ED Course  ED Course as of Nov 12 0921 Tue Nov 12, 2019   0909 Patient declined troponin lab draw to assess for acs; reviewed shoulder films no d/l or fx reviewed stable screw fracture on neck hardware patient aware offered sling states has one at home; follow with [facility]      0910 PAPMPaware 6/17/19 percocet 5/325 #10 2 days                                  [facility]  Number of Diagnoses or Management Options  Diagnosis management comments: Mdm: will proceed with CT head/neck and plain film of rt shoulder and CXR; will recommend EKG and trop to eval for ACS         Disposition  Final diagnoses:   Right shoulder pain   RML pneumonia ([address] )     Time reflects when diagnosis was documented in both MDM as applicable and the Disposition within this note     Time User Action Codes Description Comment    11/12/2019  8:51 AM Doris Goldberg Add [Z65 759] Right shoulder pain     11/12/2019  8:53 AM Doris Koo [J18 1] RML pneumonia [facility])       ED Disposition     ED Disposition Condition Date/Time Comment    Discharge Stable Tue Nov 12, 2019 9:01 AM [facility] discharge to home/self care              Follow-up Information     Follow up With Specialties Details Why Contact Info    Sebastien Cade PA-C Family Medicine, Physician Assistant Go in 1 week recheck of cough/pneumonia [address] [facility] [address]      [facility]    [phone] recheck of shoulder in 2 days          Discharge Medication List as of 11/12/2019  9:12 AM      START taking these medications    Details   levofloxacin (LEVAQUIN) 750 mg tablet Take 1 tablet (750 mg total) by mouth every 24 hours for 4 days, Starting Tue 11/12/2019, Until Sat 11/16/2019, Normal      traMADol (ULTRAM) 50 mg tablet Take 1 tablet (50 mg total) by mouth every 6 (six) hours as needed for moderate pain for up to 20 doses, Starting Tue 11/12/2019, Normal         CONTINUE these medications which have NOT CHANGED    Details   acetaminophen (TYLENOL) 325 mg tablet Take 2 tablets every 6 hours as needed, Print      albuterol (2 5 mg/3 mL) 0 083 % nebulizer solution Take 1 vial (2 5 mg total) by nebulization every 6 (six) hours as needed for wheezing or shortness of breath, Starting Tue 7/17/2018, Normal      albuterol (PROVENTIL HFA,VENTOLIN HFA) 90 mcg/act inhaler INHALE 2 PUFFS BY MOUTH EVERY 4 HOURS AS NEEDED FOR WHEEZING AND FOR SHORTNESS OF BREATH, Normal      amitriptyline (ELAVIL) 25 mg tablet Take 1 tablet (25 mg total) by mouth daily at bedtime as needed for sleep, Starting Thu 6/13/2019, Normal      cholecalciferol (VITAMIN D3) 1,000 units tablet Take 1 tablet (1,000 Units total) by mouth daily, Starting Tue 7/17/2018, Normal      cyanocobalamin (VITAMIN B-12) 100 mcg tablet Take 1 tablet (100 mcg total) by mouth daily, Starting Tue 0/48/0940, Normal      folic acid (FOLVITE) 1 mg tablet Take 1 tablet (1 mg total) by mouth daily, Starting Mon 11/4/2019, Normal      furosemide (LASIX) 20 mg tablet Take 1 tablet (20 mg total) by mouth daily, Starting Fri 9/27/2019, Normal gabapentin (NEURONTIN) 100 mg capsule Take 1 capsule (100 mg total) by mouth 3 (three) times a day, Starting Mon 11/4/2019, Normal      levETIRAcetam (KEPPRA) 1000 MG tablet TAKE 1 TABLET BY MOUTH EVERY 12 HOURS, Normal      lisinopril (ZESTRIL) 10 mg tablet Take 1 tablet (10 mg total) by mouth daily, Starting Fri 9/20/2019, Normal      magnesium oxide (MAG-OX) 400 mg Take 2 tablets (800 mg total) by mouth 3 (three) times a day, Starting Mon 7/30/2018, No Print      Multiple Vitamin (TAB-A-BONI PO) Take 1 tablet by mouth daily, Historical Med      naproxen (NAPROSYN) 500 mg tablet Take 1 tablet (500 mg total) by mouth 2 (two) times a day with meals, Starting Sat 6/15/2019, Print      pantoprazole (PROTONIX) 40 mg tablet Take 1 tablet (40 mg total) by mouth 2 (two) times a day before meals, Starting Tue 9/17/2019, Print      sodium chloride 1 g tablet Take 1 tablet (1 g total) by mouth 3 (three) times a day, Starting Tue 7/17/2018, Normal      thiamine 100 MG tablet Take 1 tablet (100 mg total) by mouth daily, Starting Mon 11/4/2019, Normal           No discharge procedures on file      ED Provider  Electronically Signed by           Mamta Murcia MD  11/12/19 6773

## 2019-12-12 ENCOUNTER — OFFICE VISIT (OUTPATIENT)
Dept: GASTROENTEROLOGY | Facility: CLINIC | Age: 54
End: 2019-12-12
Payer: COMMERCIAL

## 2019-12-12 VITALS
TEMPERATURE: 97.9 F | DIASTOLIC BLOOD PRESSURE: 98 MMHG | SYSTOLIC BLOOD PRESSURE: 125 MMHG | HEIGHT: 66 IN | BODY MASS INDEX: 21.44 KG/M2 | HEART RATE: 82 BPM | WEIGHT: 133.4 LBS

## 2019-12-12 DIAGNOSIS — D50.0 IRON DEFICIENCY ANEMIA DUE TO CHRONIC BLOOD LOSS: Primary | ICD-10-CM

## 2019-12-12 DIAGNOSIS — K76.0 HEPATIC STEATOSIS: ICD-10-CM

## 2019-12-12 DIAGNOSIS — D36.9 TUBULAR ADENOMA: ICD-10-CM

## 2019-12-12 DIAGNOSIS — K29.70 GASTRITIS: ICD-10-CM

## 2019-12-12 DIAGNOSIS — K26.9 DUODENAL ULCER: ICD-10-CM

## 2019-12-12 PROCEDURE — 99214 OFFICE O/P EST MOD 30 MIN: CPT | Performed by: PHYSICIAN ASSISTANT

## 2019-12-12 RX ORDER — PANTOPRAZOLE SODIUM 40 MG/1
40 TABLET, DELAYED RELEASE ORAL
Qty: 60 TABLET | Refills: 5 | Status: ON HOLD | OUTPATIENT
Start: 2019-12-12 | End: 2020-08-29 | Stop reason: SDUPTHER

## 2019-12-12 NOTE — PATIENT INSTRUCTIONS
Call patient in March 2020  to schedule for a Colonoscopy/EGD, Gave scripts for blood work and Scheduled patient for a US Right [place] on  12 /16 /2019  8:30 pm  @ [facility]  Gave instructions for the US

## 2019-12-12 NOTE — PROGRESS NOTES
Assessment/Plan:     Diagnoses and all orders for this visit:    Iron deficiency anemia due to chronic blood loss  Duodenal ulcer  Patient was seen in September for symptomatic anemia and was found to have a duodenal ulcer at the site of his previous duodenal perforation/Richmond's patch  He was started on pantoprazole 40 mg b i d  which he continues  He denies any signs of active bleeding  He he has not had a repeat CBC since  He has received iron infusions and follows with hematology  He is due for repeat blood work next month  It was recommended that he undergo repeat endoscopy in 6 months time to ensure healing   -     EGD; Future        -     pantoprazole (PROTONIX) 40 mg tablet; Take 1 tablet (40 mg total) by mouth 2 (two) times a day before meals    Hepatic steatosis  He has a diagnosis of cirrhosis in his chart however I do not see any date is supporting this  His most recent imaging did not show any signs of cirrhosis or any abnormalities of liver  His platelet count is within normal limits  He has not had an INR checked in quite some time  His ratio of AST to ALT is consistent with alcohol use but not cirrhosis  Would recommend rechecking labs as well as imaging  I did recommend he decrease his alcohol usage /abstain  -     Comprehensive metabolic panel; Future  -     Protime-INR; Future  -     US right upper quadrant; Future    Tubular adenoma  He was found a tubular adenomatous polyp that was removed in piecemeal fashion on his last colonoscopy  It was recommended that he have his colonoscopy repeated in 6 months time to ensure that the polyp was completely removed  Patient is agreeable we will go ahead and schedule him for these procedures in March  -     Colonoscopy; Future      See him back after his procedures to discuss all results  Subjective:      Patient ID: Esther Suarez is a 49 y o  male  HPI     This is a hospital follow-up    Patient was seen in September this year due to dizziness/lightheadedness  He was found at that time to have a hemoglobin of 8 1, down from 13 5 in January  MCV was low and iron studies were consistent with iron deficiency anemia  He underwent endoscopy at that time which showed a clean based ulcer in the duodenal bulb, likely at the site of Richmond's patch from prior perforated ulceration  He was started on pantoprazole 40 mg b i d  which he has been taking since then  He denies any heartburn or reflux symptoms  He denies any abdominal pain  He denies any difficulty with his bowels  He also underwent colonoscopy at the same time to rule out lower GI bleeding, 1 - 20 mm flat polyp was found in the ascending colon as well as small internal hemorrhoids  This was removed in a piecemeal fashion was found to be a tubular adenoma and therefore recommended to be repeated in 6 months time  Patient is a heavy drinker, drinking 4-6 beers per day  He states he drinks more in the past and has trying for many years, since a teenager  According to his chart he has a diagnosis of cirrhosis however most recent imaging in January of this year did not show any liver abnormalities  He did previously have a CT and ultrasound last year that did show hepatic steatosis  His most recent LFTs which were from September did show a AST of 79, ALT 32, alkaline phosphatase 135, total bilirubin is 0 5      Patient Active Problem List   Diagnosis    Alcohol dependence ([address] )    Tobacco abuse    HTN, goal below 140/90    Chronic obstructive pulmonary disease ([address] )    H/O suicide attempt    H/O cervical spine surgery    Cholelithiasis    Hyponatremia    Dietary folate deficiency anemia    Ventral hernia without obstruction or gangrene    Hepatomegaly    Hepatic steatosis    Hypomagnesemia    Elevated alkaline phosphatase level    Alcoholic hepatitis without ascites    Vitamin D deficiency    Iron deficiency    Urinary retention    Bladder wall thickening  Generalized weakness    Malnutrition of moderate degree (HCC)    Hypokalemia    Seizures (HCC)    Seizure (HCC)    Continuous chronic alcoholism (HCC)    Incisional hernia    Hx of seizure disorder    Seizure-[facility] activity [facility] [address] [facility]    Diarrhea    Abscess of right leg    Abnormality of pancreatic duct    Alcohol abuse, daily use    Allergic rhinitis    Microcytic anemia    Abdominal wound dehiscence    Cervical disc disorder with myelopathy    Cervical spinal stenosis    Depression    Left foot drop    Lumbar radiculopathy    Neuropathy involving both lower extremities    Peripheral neuropathy    T6 vertebral fracture (HCC)    Alcoholic cirrhosis of liver without ascites (HCC)    Thrombocytopenia (HCC)    Closed fracture of left olecranon process, initial encounter    Aspiration pneumonia (HCC)    Iron deficiency anemia due to chronic blood loss    Duodenal ulcer    Tubular adenoma     Allergies   Allergen Reactions    Cholestatin      Current Outpatient Medications on File Prior to Visit   Medication Sig    acetaminophen (TYLENOL) 325 mg tablet Take 2 tablets every 6 hours as needed    albuterol (2 5 mg/3 mL) 0 083 % nebulizer solution Take 1 vial (2 5 mg total) by nebulization every 6 (six) hours as needed for wheezing or shortness of breath    albuterol (PROVENTIL HFA,VENTOLIN HFA) 90 mcg/act inhaler INHALE 2 PUFFS BY MOUTH EVERY 4 HOURS AS NEEDED FOR WHEEZING AND FOR SHORTNESS OF BREATH    amitriptyline (ELAVIL) 25 mg tablet Take 1 tablet (25 mg total) by mouth daily at bedtime as needed for sleep    cholecalciferol (VITAMIN D3) 1,000 units tablet Take 1 tablet (1,000 Units total) by mouth daily    cyanocobalamin (VITAMIN B-12) 100 mcg tablet Take 1 tablet (100 mcg total) by mouth daily    folic acid (FOLVITE) 1 mg tablet Take 1 tablet (1 mg total) by mouth daily    furosemide (LASIX) 20 mg tablet Take 1 tablet (20 mg total) by mouth daily    gabapentin (NEURONTIN) 100 mg capsule Take 1 capsule (100 mg total) by mouth 3 (three) times a day    levETIRAcetam (KEPPRA) 1000 MG tablet TAKE 1 TABLET BY MOUTH EVERY 12 HOURS    lisinopril (ZESTRIL) 10 mg tablet Take 1 tablet (10 mg total) by mouth daily    magnesium oxide (MAG-OX) 400 mg Take 2 tablets (800 mg total) by mouth 3 (three) times a day    Multiple Vitamin (TAB-A-BONI PO) Take 1 tablet by mouth daily    naproxen (NAPROSYN) 500 mg tablet Take 1 tablet (500 mg total) by mouth 2 (two) times a day with meals    sodium chloride 1 g tablet Take 1 tablet (1 g total) by mouth 3 (three) times a day    thiamine 100 MG tablet Take 1 tablet (100 mg total) by mouth daily    traMADol (ULTRAM) 50 mg tablet Take 1 tablet (50 mg total) by mouth every 6 (six) hours as needed for moderate pain for up to 20 doses    [DISCONTINUED] pantoprazole (PROTONIX) 40 mg tablet Take 1 tablet (40 mg total) by mouth 2 (two) times a day before meals     No current facility-administered medications on file prior to visit        Family History   Problem Relation Age of Onset   Rocio Escalante Breast cancer Mother     Prostate cancer Father     Skin cancer Brother      Past Medical History:   Diagnosis Date    Alcohol abuse     [facility]el obstruction ([facility]     Bowel perforation ([address] )     Cardiac disease     Continuous chronic alcoholism ([facility] [address] [facility] ) 10/5/2017    COPD (chronic obstructive pulmonary disease) ([facility] [facility] )     History of shoulder surgery     Right shoulder    History of transfusion     Hx of cervical spine surgery     Hypertension     Incisional hernia 10/8/2017    MI, old     Mitral regurgitation     Psychiatric disorder     Seizures ([facility] [address] )      Social History     Socioeconomic History    Marital status: Single     Spouse name: None    Number of children: None    Years of education: None    Highest education level: None   Occupational History    None   Social Needs    Financial resource strain: None    Food insecurity:     Worry: None     Inability: None  Transportation needs:     Medical: None     Non-medical: None   Tobacco Use    Smoking status: Current Every Day Smoker     Packs/day: 1 00     Years: 15 00     Pack years: 15 00     Types: Cigarettes    Smokeless tobacco: Never Used   Substance and Sexual Activity    Alcohol use: Yes     Alcohol/week: 6 0 standard drinks     Types: 6 Cans of beer per week     Comment: 6 12oz cans a day    Drug use: No    Sexual activity: Yes   Lifestyle    Physical activity:     Days per week: None     Minutes per session: None    Stress: None   Relationships    Social connections:     Talks on phone: None     Gets together: None     Attends Baptism service: None     Active member of club or organization: None     Attends meetings of clubs or organizations: None     Relationship status: None    Intimate partner violence:     Fear of current or ex partner: None     Emotionally abused: None     Physically abused: None     Forced sexual activity: None   Other Topics Concern    None   Social History Narrative    None     Past Surgical History:   Procedure Laterality Date    APPENDECTOMY      BACK SURGERY      ESOPHAGOGASTRODUODENOSCOPY N/A 11/28/2016    Procedure: ESOPHAGOGASTRODUODENOSCOPY (EGD); Surgeon: Wellington Mora MD;  Location:  GI LAB; Service:     GALLBLADDER SURGERY      LAPAROTOMY N/A 10/25/2016    Procedure: LAPAROTOMY EXPLORATORY;  Surgeon: Elaine Sanchez MD;  Location: [facility];  Service:    [facility] MOUTH SURGERY      PERCUTANEOUS PINNING FEMORAL NECK FRACTURE      SHOULDER SURGERY Right     SHOULDER SURGERY      SMALL INTESTINE SURGERY      STOMACH SURGERY      bal surgery         Review of Systems   All other systems reviewed and are negative          Objective:      /98 (BP Location: Left arm, Patient Position: Sitting, Cuff Size: Adult)   Pulse 82   Temp 97 9 °F (36 6 °C) (Tympanic)   Ht 5' 6" (1 676 m)   Wt 60 5 kg (133 lb 6 4 oz)   BMI 21 53 kg/m²          Physical Exam Constitutional: He is oriented to person, place, and time  He appears well-developed and well-nourished  HENT:   Head: Normocephalic and atraumatic  Eyes: Conjunctivae and EOM are normal    Neck: Normal range of motion  Cardiovascular: Normal rate and regular rhythm  Pulmonary/Chest: Effort normal and breath sounds normal    Abdominal: Soft  Bowel sounds are normal  He exhibits no distension  There is no tenderness  Musculoskeletal: Normal range of motion  Neurological: He is alert and oriented to person, place, and time  Skin: Skin is warm and dry  Psychiatric: He has a normal mood and affect   His behavior is normal

## 2019-12-16 ENCOUNTER — HOSPITAL ENCOUNTER (OUTPATIENT)
Dept: ULTRASOUND IMAGING | Facility: HOSPITAL | Age: 54
Discharge: HOME | End: 2019-12-16
Payer: COMMERCIAL

## 2019-12-16 DIAGNOSIS — K76.0 HEPATIC STEATOSIS: ICD-10-CM

## 2019-12-16 PROCEDURE — 76705 ECHO EXAM OF ABDOMEN: CPT

## 2019-12-20 DIAGNOSIS — K70.10 ALCOHOLIC HEPATITIS WITHOUT ASCITES: Primary | ICD-10-CM

## 2019-12-24 ENCOUNTER — TELEPHONE (OUTPATIENT)
Dept: GASTROENTEROLOGY | Facility: MEDICAL CENTER | Age: 54
End: 2019-12-24

## 2019-12-24 NOTE — TELEPHONE ENCOUNTER
----- Message from Woo Gabriel PA-C sent at 12/20/2019  2:46 PM EST -----  US showed fatty liver with possible cirrhotic changes  I would recommend labs already order as well as fibrosure which I added   Seema Townsend

## 2020-01-03 ENCOUNTER — TELEPHONE (OUTPATIENT)
Dept: GASTROENTEROLOGY | Facility: CLINIC | Age: 55
End: 2020-01-03

## 2020-01-03 NOTE — TELEPHONE ENCOUNTER
Called and scheduled patient with Dr Nash Webber on 1/22/2020 @ [facility]  He has all the instructions for his EGD/Colonoscopy  Had no questions  Has navy blue folder  patietn expressed understanding

## 2020-01-16 ENCOUNTER — TELEPHONE (OUTPATIENT)
Dept: GASTROENTEROLOGY | Facility: CLINIC | Age: 55
End: 2020-01-16

## 2020-01-22 ENCOUNTER — ANESTHESIA (OUTPATIENT)
Dept: PERIOP | Facility: HOSPITAL | Age: 55
End: 2020-01-22

## 2020-01-22 ENCOUNTER — APPOINTMENT (OUTPATIENT)
Dept: LAB | Facility: HOSPITAL | Age: 55
End: 2020-01-22
Attending: INTERNAL MEDICINE
Payer: COMMERCIAL

## 2020-01-22 ENCOUNTER — ANESTHESIA EVENT (OUTPATIENT)
Dept: PERIOP | Facility: HOSPITAL | Age: 55
End: 2020-01-22

## 2020-01-22 ENCOUNTER — HOSPITAL ENCOUNTER (OUTPATIENT)
Dept: PERIOP | Facility: HOSPITAL | Age: 55
Setting detail: OUTPATIENT SURGERY
Discharge: HOME | End: 2020-01-22
Admitting: PHYSICIAN ASSISTANT
Payer: COMMERCIAL

## 2020-01-22 VITALS
DIASTOLIC BLOOD PRESSURE: 84 MMHG | OXYGEN SATURATION: 96 % | TEMPERATURE: 98.6 F | HEART RATE: 84 BPM | WEIGHT: 133 LBS | BODY MASS INDEX: 21.38 KG/M2 | SYSTOLIC BLOOD PRESSURE: 144 MMHG | RESPIRATION RATE: 20 BRPM | HEIGHT: 66 IN

## 2020-01-22 DIAGNOSIS — K70.10 ALCOHOLIC HEPATITIS WITHOUT ASCITES: ICD-10-CM

## 2020-01-22 DIAGNOSIS — D50.0 IRON DEFICIENCY ANEMIA DUE TO CHRONIC BLOOD LOSS: ICD-10-CM

## 2020-01-22 DIAGNOSIS — K76.0 HEPATIC STEATOSIS: ICD-10-CM

## 2020-01-22 DIAGNOSIS — K26.9 DUODENAL ULCER: ICD-10-CM

## 2020-01-22 LAB
ALBUMIN SERPL BCP-MCNC: 4.2 G/DL (ref 3.5–5)
ALP SERPL-CCNC: 121 U/L (ref 46–116)
ALT SERPL W P-5'-P-CCNC: 35 U/L (ref 12–78)
ANION GAP SERPL CALCULATED.3IONS-SCNC: 15 MMOL/L (ref 4–13)
AST SERPL W P-5'-P-CCNC: 68 U/L (ref 5–45)
BASOPHILS # BLD AUTO: 0.05 THOUSANDS/ΜL (ref 0–0.1)
BASOPHILS NFR BLD AUTO: 1 % (ref 0–1)
BILIRUB SERPL-MCNC: 0.7 MG/DL (ref 0.2–1)
BUN SERPL-MCNC: 6 MG/DL (ref 5–25)
CALCIUM SERPL-MCNC: 8.9 MG/DL (ref 8.3–10.1)
CHLORIDE SERPL-SCNC: 94 MMOL/L (ref 100–108)
CO2 SERPL-SCNC: 24 MMOL/L (ref 21–32)
CREAT SERPL-MCNC: 0.62 MG/DL (ref 0.6–1.3)
EOSINOPHIL # BLD AUTO: 0.04 THOUSAND/ΜL (ref 0–0.61)
EOSINOPHIL NFR BLD AUTO: 1 % (ref 0–6)
ERYTHROCYTE [DISTWIDTH] IN BLOOD BY AUTOMATED COUNT: 27.5 % (ref 11.6–15.1)
FERRITIN SERPL-MCNC: 7 NG/ML (ref 8–388)
GFR SERPL CREATININE-BSD FRML MDRD: 113 ML/MIN/1.73SQ M
GLUCOSE P FAST SERPL-MCNC: 110 MG/DL (ref 65–99)
HCT VFR BLD AUTO: 24.8 % (ref 36.5–49.3)
HGB BLD-MCNC: 7.2 G/DL (ref 12–17)
IMM GRANULOCYTES # BLD AUTO: 0.01 THOUSAND/UL (ref 0–0.2)
IMM GRANULOCYTES NFR BLD AUTO: 0 % (ref 0–2)
INR PPP: 1.07 (ref 0.84–1.19)
IRON SATN MFR SERPL: 3 %
IRON SERPL-MCNC: 19 UG/DL (ref 65–175)
LYMPHOCYTES # BLD AUTO: 1.01 THOUSANDS/ΜL (ref 0.6–4.47)
LYMPHOCYTES NFR BLD AUTO: 23 % (ref 14–44)
MCH RBC QN AUTO: 23.3 PG (ref 26.8–34.3)
MCHC RBC AUTO-ENTMCNC: 29 G/DL (ref 31.4–37.4)
MCV RBC AUTO: 80 FL (ref 82–98)
MONOCYTES # BLD AUTO: 0.63 THOUSAND/ΜL (ref 0.17–1.22)
MONOCYTES NFR BLD AUTO: 14 % (ref 4–12)
NEUTROPHILS # BLD AUTO: 2.63 THOUSANDS/ΜL (ref 1.85–7.62)
NEUTS SEG NFR BLD AUTO: 61 % (ref 43–75)
NRBC BLD AUTO-RTO: 0 /100 WBCS
PLATELET # BLD AUTO: 139 THOUSANDS/UL (ref 149–390)
PMV BLD AUTO: 9.2 FL (ref 8.9–12.7)
POTASSIUM SERPL-SCNC: 3.8 MMOL/L (ref 3.5–5.3)
PROT SERPL-MCNC: 8.6 G/DL (ref 6.4–8.2)
PROTHROMBIN TIME: 13.9 SECONDS (ref 11.6–14.5)
RBC # BLD AUTO: 3.09 MILLION/UL (ref 3.88–5.62)
SODIUM SERPL-SCNC: 133 MMOL/L (ref 136–145)
TIBC SERPL-MCNC: 601 UG/DL (ref 250–450)
WBC # BLD AUTO: 4.37 THOUSAND/UL (ref 4.31–10.16)

## 2020-01-22 PROCEDURE — 80053 COMPREHEN METABOLIC PANEL: CPT

## 2020-01-22 PROCEDURE — 45380 COLONOSCOPY AND BIOPSY: CPT | Performed by: INTERNAL MEDICINE

## 2020-01-22 PROCEDURE — 88305 TISSUE EXAM BY PATHOLOGIST: CPT | Performed by: PATHOLOGY

## 2020-01-22 PROCEDURE — 82728 ASSAY OF FERRITIN: CPT

## 2020-01-22 PROCEDURE — 83550 IRON BINDING TEST: CPT

## 2020-01-22 PROCEDURE — 88342 IMHCHEM/IMCYTCHM 1ST ANTB: CPT | Performed by: PATHOLOGY

## 2020-01-22 PROCEDURE — 85025 COMPLETE CBC W/AUTO DIFF WBC: CPT

## 2020-01-22 PROCEDURE — 82977 ASSAY OF GGT: CPT

## 2020-01-22 PROCEDURE — 43239 EGD BIOPSY SINGLE/MULTIPLE: CPT | Performed by: INTERNAL MEDICINE

## 2020-01-22 PROCEDURE — NC001 PR NO CHARGE: Performed by: INTERNAL MEDICINE

## 2020-01-22 PROCEDURE — 83883 ASSAY NEPHELOMETRY NOT SPEC: CPT

## 2020-01-22 PROCEDURE — 85610 PROTHROMBIN TIME: CPT

## 2020-01-22 PROCEDURE — 83010 ASSAY OF HAPTOGLOBIN QUANT: CPT

## 2020-01-22 PROCEDURE — 83540 ASSAY OF IRON: CPT

## 2020-01-22 PROCEDURE — 82172 ASSAY OF APOLIPOPROTEIN: CPT

## 2020-01-22 PROCEDURE — 36415 COLL VENOUS BLD VENIPUNCTURE: CPT

## 2020-01-22 RX ORDER — PROPOFOL 10 MG/ML
INJECTION, EMULSION INTRAVENOUS CONTINUOUS PRN
Status: DISCONTINUED | OUTPATIENT
Start: 2020-01-22 | End: 2020-01-22 | Stop reason: SURG

## 2020-01-22 RX ORDER — PROPOFOL 10 MG/ML
INJECTION, EMULSION INTRAVENOUS AS NEEDED
Status: DISCONTINUED | OUTPATIENT
Start: 2020-01-22 | End: 2020-01-22 | Stop reason: SURG

## 2020-01-22 RX ORDER — ONDANSETRON 2 MG/ML
4 INJECTION INTRAMUSCULAR; INTRAVENOUS ONCE AS NEEDED
Status: DISCONTINUED | OUTPATIENT
Start: 2020-01-22 | End: 2020-01-26 | Stop reason: HOSPADM

## 2020-01-22 RX ORDER — SODIUM CHLORIDE, SODIUM LACTATE, POTASSIUM CHLORIDE, CALCIUM CHLORIDE 600; 310; 30; 20 MG/100ML; MG/100ML; MG/100ML; MG/100ML
125 INJECTION, SOLUTION INTRAVENOUS CONTINUOUS
Status: DISCONTINUED | OUTPATIENT
Start: 2020-01-22 | End: 2020-01-26 | Stop reason: HOSPADM

## 2020-01-22 RX ORDER — LIDOCAINE HYDROCHLORIDE 10 MG/ML
INJECTION, SOLUTION EPIDURAL; INFILTRATION; INTRACAUDAL; PERINEURAL AS NEEDED
Status: DISCONTINUED | OUTPATIENT
Start: 2020-01-22 | End: 2020-01-22 | Stop reason: SURG

## 2020-01-22 RX ADMIN — PROPOFOL 50 MG: 10 INJECTION, EMULSION INTRAVENOUS at 09:53

## 2020-01-22 RX ADMIN — PROPOFOL 180 MCG/KG/MIN: 10 INJECTION, EMULSION INTRAVENOUS at 10:00

## 2020-01-22 RX ADMIN — PROPOFOL 150 MG: 10 INJECTION, EMULSION INTRAVENOUS at 09:51

## 2020-01-22 RX ADMIN — PROPOFOL 50 MG: 10 INJECTION, EMULSION INTRAVENOUS at 09:59

## 2020-01-22 RX ADMIN — LIDOCAINE HYDROCHLORIDE 75 MG: 10 INJECTION, SOLUTION EPIDURAL; INFILTRATION; INTRACAUDAL; PERINEURAL at 09:51

## 2020-01-22 RX ADMIN — SODIUM CHLORIDE, SODIUM LACTATE, POTASSIUM CHLORIDE, AND CALCIUM CHLORIDE 125 ML/HR: .6; .31; .03; .02 INJECTION, SOLUTION INTRAVENOUS at 09:29

## 2020-01-22 RX ADMIN — PROPOFOL 50 MG: 10 INJECTION, EMULSION INTRAVENOUS at 09:55

## 2020-01-22 RX ADMIN — PROPOFOL 50 MG: 10 INJECTION, EMULSION INTRAVENOUS at 09:52

## 2020-01-22 NOTE — ANESTHESIA PREPROCEDURE EVALUATION
Review of Systems/Medical History  Patient summary reviewed  Chart reviewed      Cardiovascular  Hypertension , Past MI > 6 months,   Comment: 2017 - normal biV systolic function, mild AS,  Pulmonary  Smoker cigarette smoker  , Pneumonia, COPD ,        GI/Hepatic    PUD, Liver disease , Hepatitis ,   Comment: etOH abuse          Endo/Other     GYN       Hematology  Anemia ,    Comment: Chronic anemia Musculoskeletal       Neurology  Seizures (on keppra) ,     Psychology   Depression ,     Comment: etoh abuse         Physical Exam    Airway    Mallampati score: II  TM Distance: >3 FB  Neck ROM: full     Dental   lower dentures and upper dentures,     Cardiovascular  Rhythm: regular, Rate: normal, Cardiovascular exam normal    Pulmonary  Pulmonary exam normal Breath sounds clear to auscultation,     Other Findings        Anesthesia Plan  ASA Score- 4     Anesthesia Type- IV sedation with anesthesia with ASA Monitors  Additional Monitors:   Airway Plan:     Comment: IV sedation, GA back up; standard ASA monitors  Risks and benefits discussed with patient; patient consented and agrees to proceed  I saw and evaluated the patient  If seen with CRNA, we have discussed the anesthetic plan and I am in agreement that the plan is appropriate for the patient  ST to day, possibly from dehydration from prep, anxiety  Pt denies symptoms of SOB, CP, says he feels fine  He has had prior ECG from HR in low 100s as well  I think it is safe to proceed given his overall other usual health; he has had an ECHO 2017 that showed normal structure        Plan Factors-Patient not instructed to abstain from smoking on day of procedure  Patient did not smoke on day of surgery  Induction- intravenous  Postoperative Plan-     Informed Consent- Anesthetic plan and risks discussed with patient  I personally reviewed this patient with the CRNA  Discussed and agreed on the Anesthesia Plan with the CRNA  Jim Michelle

## 2020-01-22 NOTE — H&P
History and Physical -  [facility]  Alfredito Bowman 49 y o  male MRN: [id number]                  HPI: Alfredito Bowman is a 49y o  year old male who presents for iron deficiency anemia, history of colon polyps, and history of duodenal ulcer  REVIEW OF SYSTEMS: Per the HPI, and otherwise unremarkable  Historical Information   Past Medical History:   Diagnosis Date    Alcohol abuse     Bowel obstruction (HCC)     Bowel perforation (HCC)     Cardiac disease     Continuous chronic alcoholism ([facility] [address] ) 10/5/2017    COPD (chronic obstructive pulmonary disease) (HCC)     History of shoulder surgery     Right shoulder    History of transfusion     Hx of cervical spine surgery     Hypertension     Incisional hernia 10/8/2017    MI, old     Mitral regurgitation     Psychiatric disorder     Seizures ([facility] [address] )      Past Surgical History:   Procedure Laterality Date    APPENDECTOMY      BACK SURGERY      CHOLECYSTECTOMY      ESOPHAGOGASTRODUODENOSCOPY N/A 11/28/2016    Procedure: ESOPHAGOGASTRODUODENOSCOPY (EGD); Surgeon: Radhames Moya MD;  Location:  GI LAB;   Service:    [address]      LAPAROTOMY N/A 10/25/2016    Procedure: LAPAROTOMY EXPLORATORY;  Surgeon: Ramona Reyes MD;  Location: [facility];  Service:    [facility] MOUTH SURGERY      PERCUTANEOUS PINNING FEMORAL NECK FRACTURE      SHOULDER SURGERY Right     SHOULDER SURGERY      SMALL INTESTINE SURGERY      STOMACH SURGERY      bal surgery     Social History   Social History     Substance and Sexual Activity   Alcohol Use Yes    Alcohol/week: 6 0 standard drinks    Types: 6 Cans of beer per week    Comment: 6 12oz cans a day     Social History     Substance and Sexual Activity   Drug Use No     Social History     Tobacco Use   Smoking Status Current Every Day Smoker    Packs/day: 2 00    Years: 15 00    Pack years: 30 00    Types: Cigarettes   Smokeless Tobacco Never Used     Family History   Problem Relation Age of Onset    Breast cancer Mother     Prostate cancer Father     Skin cancer Brother        Meds/Allergies       (Not in a hospital admission)    Allergies   Allergen Reactions    Cholestatin        Objective     /87   Pulse (!) 119   Temp 97 6 °F (36 4 °C) (Tympanic)   Resp 20   Ht 5' 6" (1 676 m)   Wt 60 3 kg (133 lb)   SpO2 99%   BMI 21 47 kg/m²       PHYSICAL EXAM    Gen: NAD  CV: RRR  CHEST: Clear  ABD: soft, NT/ND  EXT: no edema      ASSESSMENT/PLAN:  This is a 49y o  year old male here for upper endoscopy and colonoscopy, and he is stable and optimized for his procedure

## 2020-01-25 LAB
A2 MACROGLOB SERPL-MCNC: 267 MG/DL (ref 110–276)
ALT SERPL W P-5'-P-CCNC: 26 IU/L (ref 0–55)
APO A-I SERPL-MCNC: 168 MG/DL (ref 101–178)
BILIRUB SERPL-MCNC: 0.3 MG/DL (ref 0–1.2)
COMMENT: ABNORMAL
FIBROSIS SCORING:: ABNORMAL
FIBROSIS STAGE SERPL QL: ABNORMAL
GGT SERPL-CCNC: 335 IU/L (ref 0–65)
HAPTOGLOB SERPL-MCNC: 67 MG/DL (ref 29–370)
INTERPRETATIONS: ABNORMAL
LIVER FIBR SCORE SERPL CALC.FIBROSURE: 0.55 (ref 0–0.21)
NECROINFLAMM ACTIVITY SCORING:: ABNORMAL
NECROINFLAMMATORY ACT GRADE SERPL QL: ABNORMAL
NECROINFLAMMATORY ACT SCORE SERPL: 0.17 (ref 0–0.17)
SERVICE CMNT-IMP: ABNORMAL

## 2020-01-28 ENCOUNTER — DOCUMENTATION (OUTPATIENT)
Dept: GASTROENTEROLOGY | Facility: CLINIC | Age: 55
End: 2020-01-28

## 2020-01-28 ENCOUNTER — OFFICE VISIT (OUTPATIENT)
Dept: HEMATOLOGY ONCOLOGY | Facility: CLINIC | Age: 55
End: 2020-01-28
Payer: COMMERCIAL

## 2020-01-28 ENCOUNTER — TELEPHONE (OUTPATIENT)
Dept: GASTROENTEROLOGY | Facility: MEDICAL CENTER | Age: 55
End: 2020-01-28

## 2020-01-28 VITALS
HEART RATE: 87 BPM | HEIGHT: 66 IN | TEMPERATURE: 98.1 F | OXYGEN SATURATION: 100 % | SYSTOLIC BLOOD PRESSURE: 124 MMHG | WEIGHT: 138.6 LBS | DIASTOLIC BLOOD PRESSURE: 78 MMHG | BODY MASS INDEX: 22.28 KG/M2 | RESPIRATION RATE: 17 BRPM

## 2020-01-28 DIAGNOSIS — D69.6 THROMBOCYTOPENIA (HCC): ICD-10-CM

## 2020-01-28 DIAGNOSIS — D50.0 IRON DEFICIENCY ANEMIA DUE TO CHRONIC BLOOD LOSS: Primary | ICD-10-CM

## 2020-01-28 PROCEDURE — 99215 OFFICE O/P EST HI 40 MIN: CPT | Performed by: INTERNAL MEDICINE

## 2020-01-28 RX ORDER — SODIUM CHLORIDE 9 MG/ML
20 INJECTION, SOLUTION INTRAVENOUS ONCE
Status: CANCELLED | OUTPATIENT
Start: 2020-02-06

## 2020-01-28 NOTE — PROGRESS NOTES
[facility] [place]  [address]  [place] [address]  [phone]    Jeremiah GARCIA,[birth year], [id number]  01/28/20    Discussion:   In summary, this is a 52-year-old male history of iron deficiency anemia secondary to duodenal ulcer diagnosed in September 2019  He was treated with parental iron in September and October  He had recent repeat EGD which showed resolution of previous duodenal ulcer  Colonoscopy was repeated and several polyps were removed  Repeat is planned in 3 years  He denies melena over the past few weeks  Recent CBC shows normal white count, hemoglobin 7 2, MCV 80, platelets 488, normal differential   Iron studies show iron saturation 3%, ferritin 7  This scenario is quite difficult to reconcile  Hematologic parameters indicate persistence of iron deficiency anemia despite endoscopic finding of resolved duodenal ulcer  He denies melena or hematochezia  None the less, I have to theorized that he has another bleeding source, possibly in the small intestine  Absorption is obviously not the issue as parental iron was administered  In the short run, repeat parental iron replacement is arranged with Feraheme 510 mg IV x2 doses  I will confer with Gastroenterology today regarding the utility of capsule enteroscopy  We will have him come back in 2 weeks for review with repeat blood work  He is symptomatic from the anemia with dyspnea exertion  Some of this, however, is due to background COPD and response to rescue inhalers on a p r n  Basis  Additionally 8, I note that he has borderline thrombocytopenia which had been normal in the past   This suggest the possibility of consumptive coagulopathy  DIC is also possible, though less likely  Coagulation studies are requested  I discussed the above with the patient    The patient  voiced understanding and agreement   ______________________________________________________________________    Chief Complaint   Patient presents with    Follow-up       HPI:   No history exists  Interval History:  Clinically stable  ECOG-  1 - Symptomatic but completely ambulatory    Review of Systems   Constitutional: Negative for chills and fever  HENT: Negative for nosebleeds  Eyes: Negative for discharge  Respiratory: Negative for cough and shortness of breath  Cardiovascular: Negative for chest pain  Gastrointestinal: Negative for abdominal pain, constipation and diarrhea  Endocrine: Negative for polydipsia  Genitourinary: Negative for hematuria  Musculoskeletal: Negative for arthralgias  Skin: Negative for color change  Allergic/Immunologic: Negative for immunocompromised state  Neurological: Negative for dizziness and headaches  Hematological: Negative for adenopathy  Psychiatric/Behavioral: Negative for agitation         Past Medical History:   Diagnosis Date    Alcohol abuse     Bowel obstruction (HCC)     Bowel perforation (HCC)     Cardiac disease     Continuous chronic alcoholism ([facility] ) 10/5/2017    COPD (chronic obstructive pulmonary disease) (HCC)     History of shoulder surgery     Right shoulder    History of transfusion     Hx of cervical spine surgery     Hypertension     Incisional hernia 10/8/2017    MI, old     Mitral regurgitation     Psychiatric disorder     Seizures ([facility] )      Patient Active Problem List   Diagnosis    Alcohol dependence ([facility] )    Tobacco abuse    HTN, goal below 140/90    Chronic obstructive pulmonary disease ([address] )    H/O suicide attempt    H/O cervical spine surgery    Cholelithiasis    Hyponatremia    Dietary folate deficiency anemia    Ventral hernia without obstruction or gangrene    Hepatomegaly    Hepatic steatosis    Hypomagnesemia    Elevated alkaline phosphatase level    Alcoholic hepatitis without ascites    Vitamin D deficiency    Iron deficiency    Urinary retention    Bladder wall thickening    Generalized weakness    Malnutrition of moderate degree (HCC)    Hypokalemia    Seizures (HCC)    Seizure (HCC)    Continuous chronic alcoholism (HCC)    Incisional hernia    Hx of seizure disorder    Seizure-like activity ([facility] )    Diarrhea    Abscess of right leg    Abnormality of pancreatic duct    Alcohol abuse, daily use    Allergic rhinitis    Microcytic anemia    Abdominal wound dehiscence    Cervical disc disorder with myelopathy    Cervical spinal stenosis    Depression    Left foot drop    Lumbar radiculopathy    Neuropathy involving both lower extremities    Peripheral neuropathy    T6 vertebral fracture (HCC)    Alcoholic cirrhosis of liver without ascites (HCC)    Thrombocytopenia (HCC)    Closed fracture of left olecranon process, initial encounter    Aspiration pneumonia (HCC)    Iron deficiency anemia due to chronic blood loss    Duodenal ulcer    Tubular adenoma       Current Outpatient Medications:     acetaminophen (TYLENOL) 325 mg tablet, Take 2 tablets every 6 hours as needed, Disp: 30 tablet, Rfl: 0    albuterol (2 5 mg/3 mL) 0 083 % nebulizer solution, Take 1 vial (2 5 mg total) by nebulization every 6 (six) hours as needed for wheezing or shortness of breath, Disp: 100 vial, Rfl: 3    albuterol (PROVENTIL HFA,VENTOLIN HFA) 90 mcg/act inhaler, INHALE 2 PUFFS BY MOUTH EVERY 4 HOURS AS NEEDED FOR WHEEZING AND FOR SHORTNESS OF BREATH, Disp: 18 each, Rfl: 3    amitriptyline (ELAVIL) 25 mg tablet, Take 1 tablet (25 mg total) by mouth daily at bedtime as needed for sleep, Disp: 30 tablet, Rfl: 3    cholecalciferol (VITAMIN D3) 1,000 units tablet, Take 1 tablet (1,000 Units total) by mouth daily, Disp: 30 tablet, Rfl: 5    cyanocobalamin (VITAMIN B-12) 100 mcg tablet, Take 1 tablet (100 mcg total) by mouth daily, Disp: 30 tablet, Rfl: 5    folic acid (FOLVITE) 1 mg tablet, Take 1 tablet (1 mg total) by mouth daily, Disp: 30 tablet, Rfl: 3    furosemide (LASIX) 20 mg tablet, Take 1 tablet (20 mg total) by mouth daily, Disp: 30 tablet, Rfl: 1    gabapentin (NEURONTIN) 100 mg capsule, Take 1 capsule (100 mg total) by mouth 3 (three) times a day, Disp: 90 capsule, Rfl: 3    levETIRAcetam (KEPPRA) 1000 MG tablet, TAKE 1 TABLET BY MOUTH EVERY 12 HOURS, Disp: 60 tablet, Rfl: 5    lisinopril (ZESTRIL) 10 mg tablet, Take 1 tablet (10 mg total) by mouth daily, Disp: 30 tablet, Rfl: 5    magnesium oxide (MAG-OX) 400 mg, Take 2 tablets (800 mg total) by mouth 3 (three) times a day, Disp: 180 tablet, Rfl: 3    Multiple Vitamin (TAB-A-BONI PO), Take 1 tablet by mouth daily, Disp: , Rfl:     pantoprazole (PROTONIX) 40 mg tablet, Take 1 tablet (40 mg total) by mouth 2 (two) times a day before meals, Disp: 60 tablet, Rfl: 5    sodium chloride 1 g tablet, Take 1 tablet (1 g total) by mouth 3 (three) times a day, Disp: 90 tablet, Rfl: 3    thiamine 100 MG tablet, Take 1 tablet (100 mg total) by mouth daily, Disp: 30 tablet, Rfl: 3    traMADol (ULTRAM) 50 mg tablet, Take 1 tablet (50 mg total) by mouth every 6 (six) hours as needed for moderate pain for up to 20 doses, Disp: 20 tablet, Rfl: 0  Allergies   Allergen Reactions    Cholestatin      Past Surgical History:   Procedure Laterality Date    APPENDECTOMY      BACK SURGERY      CHOLECYSTECTOMY      ESOPHAGOGASTRODUODENOSCOPY N/A 11/28/2016    Procedure: ESOPHAGOGASTRODUODENOSCOPY (EGD); Surgeon: Hallie Joseph MD;  [facility]: [facility];   Service:     GALLBLADDER SURGERY      LAPAROTOMY N/A 10/25/2016    Procedure: Figueroa Paz;  Surgeon: Tara Vasquez MD;  Location: [facility];  Service:     MOUTH SURGERY      PERCUTANEOUS PINNING FEMORAL NECK FRACTURE      SHOULDER SURGERY Right     SHOULDER SURGERY      SMALL INTESTINE SURGERY      STOMACH SURGERY      bal surgery     Social History     Objective:  Vitals:    01/28/20 0917   BP: 124/78   BP Location: Left arm   Patient Position: Sitting   Pulse: 87   Resp: 17 Temp: 98 1 °F (36 7 °C)   TempSrc: Tympanic   SpO2: 100%   Weight: 62 9 kg (138 lb 9 6 oz)   Height: 5' 6" (1 676 m)     Physical Exam   Constitutional: He is oriented to person, place, and time  He appears well-developed  HENT:   Head: Normocephalic  Eyes: Pupils are equal, round, and reactive to light  Neck: Neck supple  Cardiovascular: Normal rate and regular rhythm  No murmur heard  Pulmonary/Chest: Breath sounds normal  He has no wheezes  He has no rales  Abdominal: Soft  There is no tenderness  Musculoskeletal: Normal range of motion  He exhibits no edema or tenderness  Lymphadenopathy:     He has no cervical adenopathy  Neurological: He is alert and oriented to person, place, and time  He has normal reflexes  No cranial nerve deficit  Skin: No rash noted  No erythema  Psychiatric: He has a normal mood and affect  His behavior is normal          Labs: I personally reviewed the labs and imaging pertinent to this patient care

## 2020-01-28 NOTE — TELEPHONE ENCOUNTER
----- Message from Nadya Ramirez PA-C sent at 1/28/2020 11:01 AM EST -----  Please let patient know Dr Maurizio Byers reached out after his office appointment today given persistent iron deficiency anemia  We can plan for capsule endoscopy to rule out small bowel source of gi bleeding  I will order this and central scheduling will call him to schedule in [place]   Risk of procedure includes retained capsule, which would then require endoscopic procedure for removal  Thank you

## 2020-01-28 NOTE — PROGRESS NOTES
Patient was seen by Dr Adilson Chaudhary this morning  He reached out to GI given patient's CBC shows Hgb of 7 2, MCV of 80 and iron studies with saturation of 3% and ferritin of 7 given recent iron infusions  Patient denied signs of active bleeding  This is not felt to be secondary to alcohol abuse  He underwent recent EGD and colonoscopy 1/22/2020 with possible short-segment fonseca's esophagus, small sliding hiatal hernia, mild erythema in gastric body which could be gastritis or PHG and previously seen duodenal ulcer has healed  Colonoscopy with 2 small ascending colon polyps removed  Biopsy pending  Will plan for capsule endoscopy given persistent iron deficiency anemia and have staff notify patient

## 2020-01-31 NOTE — RESULT ENCOUNTER NOTE
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

## 2020-02-05 ENCOUNTER — HOSPITAL ENCOUNTER (OUTPATIENT)
Dept: GASTROENTEROLOGY | Facility: HOSPITAL | Age: 55
Discharge: HOME | End: 2020-02-05
Payer: COMMERCIAL

## 2020-02-05 DIAGNOSIS — D50.9 IRON DEFICIENCY ANEMIA, UNSPECIFIED IRON DEFICIENCY ANEMIA TYPE: ICD-10-CM

## 2020-02-05 PROCEDURE — 91110 GI TRC IMG INTRAL ESOPH-ILE: CPT

## 2020-02-06 ENCOUNTER — HOSPITAL ENCOUNTER (OUTPATIENT)
Dept: INFUSION CENTER | Facility: HOSPITAL | Age: 55
Discharge: HOME | End: 2020-02-06
Attending: INTERNAL MEDICINE
Payer: COMMERCIAL

## 2020-02-06 VITALS
SYSTOLIC BLOOD PRESSURE: 109 MMHG | RESPIRATION RATE: 16 BRPM | HEART RATE: 97 BPM | TEMPERATURE: 98.5 F | DIASTOLIC BLOOD PRESSURE: 65 MMHG

## 2020-02-06 DIAGNOSIS — D50.0 IRON DEFICIENCY ANEMIA DUE TO CHRONIC BLOOD LOSS: Primary | ICD-10-CM

## 2020-02-06 PROCEDURE — 96365 THER/PROPH/DIAG IV INF INIT: CPT

## 2020-02-06 RX ORDER — SODIUM CHLORIDE 9 MG/ML
20 INJECTION, SOLUTION INTRAVENOUS ONCE
Status: CANCELLED | OUTPATIENT
Start: 2020-02-11

## 2020-02-06 RX ORDER — SODIUM CHLORIDE 9 MG/ML
20 INJECTION, SOLUTION INTRAVENOUS ONCE
Status: COMPLETED | OUTPATIENT
Start: 2020-02-06 | End: 2020-02-06

## 2020-02-06 RX ADMIN — FERUMOXYTOL 510 MG: 510 INJECTION INTRAVENOUS at 15:10

## 2020-02-06 RX ADMIN — SODIUM CHLORIDE 20 ML/HR: 0.9 INJECTION, SOLUTION INTRAVENOUS at 14:28

## 2020-02-10 DIAGNOSIS — J44.9 CHRONIC OBSTRUCTIVE PULMONARY DISEASE, UNSPECIFIED COPD TYPE (HCC): ICD-10-CM

## 2020-02-10 RX ORDER — ALBUTEROL SULFATE 90 UG/1
2 AEROSOL, METERED RESPIRATORY (INHALATION) EVERY 4 HOURS PRN
Qty: 18 EACH | Refills: 3 | Status: SHIPPED | OUTPATIENT
Start: 2020-02-10 | End: 2020-05-01 | Stop reason: SDUPTHER

## 2020-02-11 ENCOUNTER — HOSPITAL ENCOUNTER (OUTPATIENT)
Dept: INFUSION CENTER | Facility: HOSPITAL | Age: 55
Discharge: HOME | End: 2020-02-11
Attending: INTERNAL MEDICINE
Payer: COMMERCIAL

## 2020-02-11 VITALS
TEMPERATURE: 96.8 F | DIASTOLIC BLOOD PRESSURE: 76 MMHG | OXYGEN SATURATION: 96 % | RESPIRATION RATE: 18 BRPM | HEART RATE: 105 BPM | SYSTOLIC BLOOD PRESSURE: 117 MMHG

## 2020-02-11 DIAGNOSIS — D50.0 IRON DEFICIENCY ANEMIA DUE TO CHRONIC BLOOD LOSS: Primary | ICD-10-CM

## 2020-02-11 PROCEDURE — 96365 THER/PROPH/DIAG IV INF INIT: CPT

## 2020-02-11 RX ORDER — SODIUM CHLORIDE 9 MG/ML
20 INJECTION, SOLUTION INTRAVENOUS ONCE
Status: COMPLETED | OUTPATIENT
Start: 2020-02-11 | End: 2020-02-11

## 2020-02-11 RX ORDER — SODIUM CHLORIDE 9 MG/ML
20 INJECTION, SOLUTION INTRAVENOUS ONCE
Status: CANCELLED | OUTPATIENT
Start: 2020-02-13

## 2020-02-11 RX ADMIN — FERUMOXYTOL 510 MG: 510 INJECTION INTRAVENOUS at 08:37

## 2020-02-11 RX ADMIN — SODIUM CHLORIDE 20 ML/HR: 0.9 INJECTION, SOLUTION INTRAVENOUS at 08:15

## 2020-02-11 NOTE — PROGRESS NOTES
Patient tolerated infusion well  Offered no complaints  Tolerated po food and fluids  Ambulated to bathroom voiced  AVS provided  Left unit in stable condition

## 2020-02-13 ENCOUNTER — APPOINTMENT (OUTPATIENT)
Dept: LAB | Facility: MEDICAL CENTER | Age: 55
End: 2020-02-13
Payer: COMMERCIAL

## 2020-02-13 DIAGNOSIS — I10 ESSENTIAL HYPERTENSION: ICD-10-CM

## 2020-02-13 DIAGNOSIS — D69.6 THROMBOCYTOPENIA (HCC): ICD-10-CM

## 2020-02-13 DIAGNOSIS — D50.0 IRON DEFICIENCY ANEMIA DUE TO CHRONIC BLOOD LOSS: ICD-10-CM

## 2020-02-13 DIAGNOSIS — G40.919 SEIZURE DISORDER, INTRACTABLE (HCC): ICD-10-CM

## 2020-02-13 LAB
ALBUMIN SERPL BCP-MCNC: 4.1 G/DL (ref 3.5–5)
ALP SERPL-CCNC: 159 U/L (ref 46–116)
ALT SERPL W P-5'-P-CCNC: 32 U/L (ref 12–78)
ANION GAP SERPL CALCULATED.3IONS-SCNC: 8 MMOL/L (ref 4–13)
APTT PPP: 31 SECONDS (ref 23–37)
AST SERPL W P-5'-P-CCNC: 81 U/L (ref 5–45)
BILIRUB SERPL-MCNC: 0.22 MG/DL (ref 0.2–1)
BUN SERPL-MCNC: 7 MG/DL (ref 5–25)
CALCIUM SERPL-MCNC: 8.9 MG/DL (ref 8.3–10.1)
CHLORIDE SERPL-SCNC: 98 MMOL/L (ref 100–108)
CHOLEST SERPL-MCNC: 159 MG/DL (ref 50–200)
CO2 SERPL-SCNC: 25 MMOL/L (ref 21–32)
CREAT SERPL-MCNC: 0.7 MG/DL (ref 0.6–1.3)
ERYTHROCYTE [DISTWIDTH] IN BLOOD BY AUTOMATED COUNT: 32.9 % (ref 11.6–15.1)
FERRITIN SERPL-MCNC: 432 NG/ML (ref 8–388)
FIBRINOGEN PPP-MCNC: 235 MG/DL (ref 227–495)
GFR SERPL CREATININE-BSD FRML MDRD: 108 ML/MIN/1.73SQ M
GLUCOSE SERPL-MCNC: 124 MG/DL (ref 65–140)
HCT VFR BLD AUTO: 30.8 % (ref 36.5–49.3)
HDLC SERPL-MCNC: 65 MG/DL
HGB BLD-MCNC: 8.8 G/DL (ref 12–17)
INR PPP: 1.04 (ref 0.84–1.19)
IRON SATN MFR SERPL: 81 %
IRON SERPL-MCNC: 500 UG/DL (ref 65–175)
LDLC SERPL CALC-MCNC: 64 MG/DL (ref 0–100)
MCH RBC QN AUTO: 22.6 PG (ref 26.8–34.3)
MCHC RBC AUTO-ENTMCNC: 28.6 G/DL (ref 31.4–37.4)
MCV RBC AUTO: 79 FL (ref 82–98)
NONHDLC SERPL-MCNC: 94 MG/DL
PLATELET # BLD AUTO: 210 THOUSANDS/UL (ref 149–390)
PMV BLD AUTO: 9.9 FL (ref 8.9–12.7)
POTASSIUM SERPL-SCNC: 3.8 MMOL/L (ref 3.5–5.3)
PROT SERPL-MCNC: 8.3 G/DL (ref 6.4–8.2)
PROTHROMBIN TIME: 13.2 SECONDS (ref 11.6–14.5)
RBC # BLD AUTO: 3.9 MILLION/UL (ref 3.88–5.62)
RETICS # AUTO: ABNORMAL 10*3/UL (ref 14356–105094)
RETICS # CALC: 7.2 % (ref 0.37–1.87)
SODIUM SERPL-SCNC: 131 MMOL/L (ref 136–145)
TIBC SERPL-MCNC: 614 UG/DL (ref 250–450)
TRIGL SERPL-MCNC: 148 MG/DL
TSH SERPL DL<=0.05 MIU/L-ACNC: 2.41 UIU/ML (ref 0.36–3.74)
WBC # BLD AUTO: 5.55 THOUSAND/UL (ref 4.31–10.16)

## 2020-02-13 PROCEDURE — 80053 COMPREHEN METABOLIC PANEL: CPT

## 2020-02-13 PROCEDURE — 85730 THROMBOPLASTIN TIME PARTIAL: CPT

## 2020-02-13 PROCEDURE — 85610 PROTHROMBIN TIME: CPT

## 2020-02-13 PROCEDURE — 85045 AUTOMATED RETICULOCYTE COUNT: CPT

## 2020-02-13 PROCEDURE — 85384 FIBRINOGEN ACTIVITY: CPT

## 2020-02-13 PROCEDURE — 83540 ASSAY OF IRON: CPT

## 2020-02-13 PROCEDURE — 85025 COMPLETE CBC W/AUTO DIFF WBC: CPT

## 2020-02-13 PROCEDURE — 84443 ASSAY THYROID STIM HORMONE: CPT

## 2020-02-13 PROCEDURE — 80061 LIPID PANEL: CPT

## 2020-02-13 PROCEDURE — 80177 DRUG SCRN QUAN LEVETIRACETAM: CPT

## 2020-02-13 PROCEDURE — 82728 ASSAY OF FERRITIN: CPT

## 2020-02-13 PROCEDURE — 36415 COLL VENOUS BLD VENIPUNCTURE: CPT

## 2020-02-13 PROCEDURE — 83550 IRON BINDING TEST: CPT

## 2020-02-14 ENCOUNTER — OFFICE VISIT (OUTPATIENT)
Dept: HEMATOLOGY ONCOLOGY | Facility: CLINIC | Age: 55
End: 2020-02-14
Payer: COMMERCIAL

## 2020-02-14 VITALS
DIASTOLIC BLOOD PRESSURE: 74 MMHG | OXYGEN SATURATION: 98 % | HEART RATE: 102 BPM | TEMPERATURE: 97.8 F | BODY MASS INDEX: 22.11 KG/M2 | SYSTOLIC BLOOD PRESSURE: 118 MMHG | RESPIRATION RATE: 18 BRPM | HEIGHT: 66 IN | WEIGHT: 137.6 LBS

## 2020-02-14 DIAGNOSIS — D50.0 IRON DEFICIENCY ANEMIA DUE TO CHRONIC BLOOD LOSS: Primary | ICD-10-CM

## 2020-02-14 PROCEDURE — 99214 OFFICE O/P EST MOD 30 MIN: CPT | Performed by: INTERNAL MEDICINE

## 2020-02-14 PROCEDURE — 3078F DIAST BP <80 MM HG: CPT | Performed by: INTERNAL MEDICINE

## 2020-02-14 PROCEDURE — 3074F SYST BP LT 130 MM HG: CPT | Performed by: INTERNAL MEDICINE

## 2020-02-14 PROCEDURE — 3008F BODY MASS INDEX DOCD: CPT | Performed by: INTERNAL MEDICINE

## 2020-02-14 NOTE — PROGRESS NOTES
[address]  [address] [id number] [phone]  [address],[birth year], [id number]  02/14/20    Discussion:   In summary, this is a 27-year-old male history of iron deficiency anemia  He had Feraheme replacement earlier this month  Iron studies have normalized, ferritin greater than 400  Hemoglobin is rising, currently 8 8, previously 7 2  White count is normal   Platelets have improved, previously 139  I suspect that his thrombocytopenia may have been consumptive due to prior bleeding  This seems to have resolved  Additionally, his hemoglobin is improving  He had a capsule endoscopy 1 week ago  Result is currently pending  I am still suspicious that he had intercurrent GI bleeding although he denies any melena or hematochezia  For the moment, his hemoglobin is rising and I suspect that it will continue to do so, retic count greater than 7%  Additionally, in conversation today I realized that he had fairly extensive abdominal interventions over the past few years including perforated appendix, ventral hernia repair, biliary stricture, etcetera  His surgeries have been done both at the [facility] and [address] [place] [address]  I think it will be quite challenging to collect a comprehensive surgical history  For the moment, this will not have any impact on hematologic response, however  I discussed the above with the patient  The patient  voiced understanding and agreement   ______________________________________________________________________    Chief Complaint   Patient presents with    Follow-up       HPI:   No history exists  Interval History:  Clinically stable  ECOG-  1 - Symptomatic but completely ambulatory    Review of Systems   Constitutional: Negative for chills and fever  HENT: Negative for nosebleeds  Eyes: Negative for discharge  Respiratory: Negative for cough and shortness of breath      Cardiovascular: Negative for chest pain  Gastrointestinal: Negative for abdominal pain, constipation and diarrhea  Endocrine: Negative for polydipsia  Genitourinary: Negative for hematuria  Musculoskeletal: Negative for arthralgias  Skin: Negative for color change  Allergic/Immunologic: Negative for immunocompromised state  Neurological: Negative for dizziness and headaches  Hematological: Negative for adenopathy  Psychiatric/Behavioral: Negative for agitation         Past Medical History:   Diagnosis Date    Alcohol abuse     Bowel obstruction (HCC)     Bowel perforation (HCC)     Cardiac disease     Continuous chronic alcoholism ([address] ) 10/5/2017    COPD (chronic obstructive pulmonary disease) (HCC)     History of shoulder surgery     Right shoulder    History of transfusion     Hx of cervical spine surgery     Hypertension     Incisional hernia 10/8/2017    MI, old     Mitral regurgitation     Psychiatric disorder     Seizures ([address] )      Patient Active Problem List   Diagnosis    Alcohol dependence ([address] )    Tobacco abuse    HTN, goal below 140/90    Chronic obstructive pulmonary disease ([address] )    H/O suicide attempt    H/O cervical spine surgery    Cholelithiasis    Hyponatremia    Dietary folate deficiency anemia    Ventral hernia without obstruction or gangrene    Hepatomegaly    Hepatic steatosis    Hypomagnesemia    Elevated alkaline phosphatase level    Alcoholic hepatitis without ascites    Vitamin D deficiency    Iron deficiency    Urinary retention    Bladder wall thickening    Generalized weakness    Malnutrition of moderate degree (HCC)    Hypokalemia    Seizures (HCC)    Seizure (HCC)    Continuous chronic alcoholism (HCC)    Incisional hernia    Hx of seizure disorder    Seizure-like activity ([address] )    Diarrhea    Abscess of right leg    Abnormality of pancreatic duct    Alcohol abuse, daily use    Allergic rhinitis    Microcytic anemia    Abdominal wound dehiscence    Cervical disc disorder with myelopathy    Cervical spinal stenosis    Depression    Left foot drop    Lumbar radiculopathy    Neuropathy involving both lower extremities    Peripheral neuropathy    T6 vertebral fracture (HCC)    Alcoholic cirrhosis of liver without ascites (HCC)    Thrombocytopenia (HCC)    Closed fracture of left olecranon process, initial encounter    Aspiration pneumonia (HCC)    Iron deficiency anemia due to chronic blood loss    Duodenal ulcer    Tubular adenoma       Current Outpatient Medications:     acetaminophen (TYLENOL) 325 mg tablet, Take 2 tablets every 6 hours as needed, Disp: 30 tablet, Rfl: 0    albuterol (2 5 mg/3 mL) 0 083 % nebulizer solution, Take 1 vial (2 5 mg total) by nebulization every 6 (six) hours as needed for wheezing or shortness of breath, Disp: 100 vial, Rfl: 3    albuterol (PROVENTIL HFA,VENTOLIN HFA) 90 mcg/act inhaler, Inhale 2 puffs every 4 (four) hours as needed for wheezing, Disp: 18 each, Rfl: 3    amitriptyline (ELAVIL) 25 mg tablet, Take 1 tablet (25 mg total) by mouth daily at bedtime as needed for sleep, Disp: 30 tablet, Rfl: 3    cholecalciferol (VITAMIN D3) 1,000 units tablet, Take 1 tablet (1,000 Units total) by mouth daily, Disp: 30 tablet, Rfl: 5    cyanocobalamin (VITAMIN B-12) 100 mcg tablet, Take 1 tablet (100 mcg total) by mouth daily, Disp: 30 tablet, Rfl: 5    folic acid (FOLVITE) 1 mg tablet, Take 1 tablet (1 mg total) by mouth daily, Disp: 30 tablet, Rfl: 3    furosemide (LASIX) 20 mg tablet, Take 1 tablet (20 mg total) by mouth daily, Disp: 30 tablet, Rfl: 1    gabapentin (NEURONTIN) 100 mg capsule, Take 1 capsule (100 mg total) by mouth 3 (three) times a day, Disp: 90 capsule, Rfl: 3    levETIRAcetam (KEPPRA) 1000 MG tablet, TAKE 1 TABLET BY MOUTH EVERY 12 HOURS, Disp: 60 tablet, Rfl: 5    lisinopril (ZESTRIL) 10 mg tablet, Take 1 tablet (10 mg total) by mouth daily, Disp: 30 tablet, Rfl: 5   magnesium oxide (MAG-OX) 400 mg, Take 2 tablets (800 mg total) by mouth 3 (three) times a day, Disp: 180 tablet, Rfl: 3    Multiple Vitamin (TAB-A-BONI PO), Take 1 tablet by mouth daily, Disp: , Rfl:     pantoprazole (PROTONIX) 40 mg tablet, Take 1 tablet (40 mg total) by mouth 2 (two) times a day before meals, Disp: 60 tablet, Rfl: 5    sodium chloride 1 g tablet, Take 1 tablet (1 g total) by mouth 3 (three) times a day, Disp: 90 tablet, Rfl: 3    thiamine 100 MG tablet, Take 1 tablet (100 mg total) by mouth daily, Disp: 30 tablet, Rfl: 3    traMADol (ULTRAM) 50 mg tablet, Take 1 tablet (50 mg total) by mouth every 6 (six) hours as needed for moderate pain for up to 20 doses, Disp: 20 tablet, Rfl: 0  Allergies   Allergen Reactions    Cholestatin      Past Surgical History:   Procedure Laterality Date    APPENDECTOMY      BACK SURGERY      CHOLECYSTECTOMY      ESOPHAGOGASTRODUODENOSCOPY N/A 11/28/2016    Procedure: ESOPHAGOGASTRODUODENOSCOPY (EGD); Surgeon: Tc Isaacs MD;  Location:  GI LAB; Service:     GALLBLADDER SURGERY      LAPAROTOMY N/A 10/25/2016    Procedure: LAPAROTOMY EXPLORATORY;  Surgeon: Dianne Dahl MD;  Location: [facility];  Service:     MOUTH SURGERY      PERCUTANEOUS PINNING FEMORAL NECK FRACTURE      SHOULDER SURGERY Right     SHOULDER SURGERY      SMALL INTESTINE SURGERY      STOMACH SURGERY      bal surgery     Social History     Objective:  Vitals:    02/14/20 0824   BP: 118/74   BP Location: Left arm   Patient Position: Sitting   Pulse: 102   Resp: 18   Temp: 97 8 °F (36 6 °C)   TempSrc: Tympanic   SpO2: 98%   Weight: 62 4 kg (137 lb 9 6 oz)   Height: 5' 6" (1 676 m)     Physical Exam   Constitutional: He is oriented to person, place, and time  He appears well-developed  HENT:   Head: Normocephalic  Eyes: Pupils are equal, round, and reactive to light  Neck: Neck supple  Cardiovascular: Normal rate and regular rhythm     No murmur heard   Pulmonary/Chest: Breath sounds normal  He has no wheezes  He has no rales  Abdominal: Soft  There is no tenderness  Musculoskeletal: Normal range of motion  He exhibits no edema or tenderness  Lymphadenopathy:     He has no cervical adenopathy  Neurological: He is alert and oriented to person, place, and time  He has normal reflexes  No cranial nerve deficit  Skin: No rash noted  No erythema  Psychiatric: He has a normal mood and affect  His behavior is normal          Labs: I personally reviewed the labs and imaging pertinent to this patient care

## 2020-02-17 LAB — LEVETIRACETAM SERPL-MCNC: <1 UG/ML (ref 10–40)

## 2020-02-18 ENCOUNTER — TELEPHONE (OUTPATIENT)
Dept: FAMILY MEDICINE CLINIC | Facility: CLINIC | Age: 55
End: 2020-02-18

## 2020-02-18 NOTE — TELEPHONE ENCOUNTER
----- Message from Marla Birmingham PA-C sent at 2/18/2020 10:37 AM EST -----  Needs appt  Is he taking the Keppra  Level is almost 0

## 2020-02-27 ENCOUNTER — DOCUMENTATION (OUTPATIENT)
Dept: GASTROENTEROLOGY | Facility: CLINIC | Age: 55
End: 2020-02-27

## 2020-02-27 PROCEDURE — 91110 GI TRC IMG INTRAL ESOPH-ILE: CPT | Performed by: INTERNAL MEDICINE

## 2020-02-27 NOTE — PROGRESS NOTES
Capsule endoscopy - performed on 2/5/20    Few xanthelasmas otherwise no active bleeding or lesion seen in the small bowel

## 2020-03-02 ENCOUNTER — TELEPHONE (OUTPATIENT)
Dept: GASTROENTEROLOGY | Facility: MEDICAL CENTER | Age: 55
End: 2020-03-02

## 2020-03-02 NOTE — TELEPHONE ENCOUNTER
----- Message from Nadya Ramirez PA-C sent at 3/2/2020  7:55 AM EST -----  Please call and let the patient know small bowel capsule was negative for gi bleeding   Thank you

## 2020-03-12 ENCOUNTER — TELEPHONE (OUTPATIENT)
Dept: GASTROENTEROLOGY | Facility: CLINIC | Age: 55
End: 2020-03-12

## 2020-03-12 NOTE — ASSESSMENT & PLAN NOTE
Hypomagnesemia  Continue to replete with magnesium sulfate    Hypophosphatemia has been repleted    Results from last 7 days   Lab Units 09/16/19  0554 09/15/19  0653 09/14/19  0420 09/13/19  1326   MAGNESIUM mg/dL 1 4* 1 4* 1 3* 1 3*   PHOSPHORUS mg/dL  --  2 8  --   -- gerardo (wife)

## 2020-03-24 NOTE — TELEPHONE ENCOUNTER
Who recommended repeat EGD and colonoscopy  Please set up virtual visit to discuss the result   Thank you

## 2020-03-25 NOTE — TELEPHONE ENCOUNTER
Patient is scheduled with Rosalinda Thakkars in April for follow up  3 year repeat colonoscopy is put in [address], reflecting Dr Yun Vasquez op note

## 2020-04-18 DIAGNOSIS — M50.10 CERVICAL DISC SYNDROME: ICD-10-CM

## 2020-04-18 RX ORDER — LEVETIRACETAM 1000 MG/1
TABLET ORAL
Qty: 60 TABLET | Refills: 0 | Status: SHIPPED | OUTPATIENT
Start: 2020-04-18 | End: 2020-12-02 | Stop reason: SDUPTHER

## 2020-04-23 ENCOUNTER — TELEPHONE (OUTPATIENT)
Dept: GASTROENTEROLOGY | Facility: CLINIC | Age: 55
End: 2020-04-23

## 2020-04-24 ENCOUNTER — TELEMEDICINE (OUTPATIENT)
Dept: GASTROENTEROLOGY | Facility: CLINIC | Age: 55
End: 2020-04-24
Payer: COMMERCIAL

## 2020-04-24 ENCOUNTER — TELEPHONE (OUTPATIENT)
Dept: GASTROENTEROLOGY | Facility: CLINIC | Age: 55
End: 2020-04-24

## 2020-04-24 DIAGNOSIS — K29.70 GASTRITIS: ICD-10-CM

## 2020-04-24 DIAGNOSIS — K74.00 HEPATIC FIBROSIS: ICD-10-CM

## 2020-04-24 DIAGNOSIS — R79.89 ELEVATED LFTS: ICD-10-CM

## 2020-04-24 DIAGNOSIS — D50.9 MICROCYTIC ANEMIA: ICD-10-CM

## 2020-04-24 DIAGNOSIS — K22.70 BARRETT'S ESOPHAGUS WITHOUT DYSPLASIA: ICD-10-CM

## 2020-04-24 DIAGNOSIS — K26.9 DUODENAL ULCER: ICD-10-CM

## 2020-04-24 DIAGNOSIS — K21.00 GASTROESOPHAGEAL REFLUX DISEASE WITH ESOPHAGITIS: Primary | ICD-10-CM

## 2020-04-24 PROCEDURE — 99213 OFFICE O/P EST LOW 20 MIN: CPT | Performed by: PHYSICIAN ASSISTANT

## 2020-05-01 ENCOUNTER — OFFICE VISIT (OUTPATIENT)
Dept: FAMILY MEDICINE CLINIC | Facility: CLINIC | Age: 55
End: 2020-05-01
Payer: COMMERCIAL

## 2020-05-01 VITALS
BODY MASS INDEX: 21.95 KG/M2 | WEIGHT: 136.6 LBS | SYSTOLIC BLOOD PRESSURE: 124 MMHG | DIASTOLIC BLOOD PRESSURE: 80 MMHG | TEMPERATURE: 97.6 F | HEIGHT: 66 IN | OXYGEN SATURATION: 99 % | RESPIRATION RATE: 18 BRPM | HEART RATE: 98 BPM

## 2020-05-01 DIAGNOSIS — J44.9 CHRONIC OBSTRUCTIVE PULMONARY DISEASE, UNSPECIFIED COPD TYPE (HCC): ICD-10-CM

## 2020-05-01 DIAGNOSIS — L98.9 SKIN LESION: Primary | ICD-10-CM

## 2020-05-01 PROCEDURE — T1015 CLINIC SERVICE: HCPCS | Performed by: FAMILY MEDICINE

## 2020-05-01 RX ORDER — ALBUTEROL SULFATE 90 UG/1
2 AEROSOL, METERED RESPIRATORY (INHALATION) EVERY 4 HOURS PRN
Qty: 18 EACH | Refills: 3 | Status: SHIPPED | OUTPATIENT
Start: 2020-05-01 | End: 2021-05-11 | Stop reason: SDUPTHER

## 2020-05-09 NOTE — TELEPHONE ENCOUNTER
Called patient lmom
Caller/relation to patient: pt      Number to return call: [phone]      Patients GI provider: dr Frankey Leyden      Reason for call: missed call for results
Pt returning missed call
Tori NUNES spoke to patient in regards to results 
Yes

## 2020-05-11 ENCOUNTER — TELEPHONE (OUTPATIENT)
Dept: FAMILY MEDICINE CLINIC | Facility: CLINIC | Age: 55
End: 2020-05-11

## 2020-07-21 ENCOUNTER — ANESTHESIA (INPATIENT)
Dept: PERIOP | Facility: HOSPITAL | Age: 55
DRG: 254 | End: 2020-07-21
Payer: COMMERCIAL

## 2020-07-21 ENCOUNTER — ANESTHESIA EVENT (INPATIENT)
Dept: PERIOP | Facility: HOSPITAL | Age: 55
DRG: 254 | End: 2020-07-21
Payer: COMMERCIAL

## 2020-07-21 ENCOUNTER — APPOINTMENT (INPATIENT)
Dept: PERIOP | Facility: HOSPITAL | Age: 55
DRG: 254 | End: 2020-07-21
Payer: COMMERCIAL

## 2020-07-21 ENCOUNTER — APPOINTMENT (EMERGENCY)
Dept: CT IMAGING | Facility: HOSPITAL | Age: 55
DRG: 254 | End: 2020-07-21
Payer: COMMERCIAL

## 2020-07-21 ENCOUNTER — APPOINTMENT (EMERGENCY)
Dept: RADIOLOGY | Facility: HOSPITAL | Age: 55
DRG: 254 | End: 2020-07-21
Payer: COMMERCIAL

## 2020-07-21 ENCOUNTER — HOSPITAL ENCOUNTER (INPATIENT)
Facility: HOSPITAL | Age: 55
LOS: 1 days | Discharge: HOME | DRG: 254 | End: 2020-07-22
Attending: EMERGENCY MEDICINE | Admitting: INTERNAL MEDICINE
Payer: COMMERCIAL

## 2020-07-21 DIAGNOSIS — E87.6 HYPOKALEMIA: ICD-10-CM

## 2020-07-21 DIAGNOSIS — D73.89 LESION OF SPLEEN: ICD-10-CM

## 2020-07-21 DIAGNOSIS — K92.1 MELENA: Primary | ICD-10-CM

## 2020-07-21 DIAGNOSIS — Z72.0 TOBACCO ABUSE: Chronic | ICD-10-CM

## 2020-07-21 DIAGNOSIS — D64.9 ANEMIA: ICD-10-CM

## 2020-07-21 DIAGNOSIS — K92.2 GI BLEED: ICD-10-CM

## 2020-07-21 DIAGNOSIS — E83.42 HYPOMAGNESEMIA: ICD-10-CM

## 2020-07-21 PROBLEM — K21.9 CHRONIC GERD: Status: ACTIVE | Noted: 2020-07-21

## 2020-07-21 LAB
ABO GROUP BLD: NORMAL
ALBUMIN SERPL BCP-MCNC: 3.7 G/DL (ref 3.5–5)
ALP SERPL-CCNC: 100 U/L (ref 46–116)
ALT SERPL W P-5'-P-CCNC: 34 U/L (ref 12–78)
ANION GAP SERPL CALCULATED.3IONS-SCNC: 10 MMOL/L (ref 4–13)
APTT PPP: 32 SECONDS (ref 23–37)
AST SERPL W P-5'-P-CCNC: 66 U/L (ref 5–45)
BASOPHILS # BLD AUTO: 0.04 THOUSANDS/ΜL (ref 0–0.1)
BASOPHILS NFR BLD AUTO: 1 % (ref 0–1)
BILIRUB SERPL-MCNC: 0.3 MG/DL (ref 0.2–1)
BLD GP AB SCN SERPL QL: NEGATIVE
BUN SERPL-MCNC: 10 MG/DL (ref 5–25)
CALCIUM SERPL-MCNC: 8.7 MG/DL (ref 8.3–10.1)
CHLORIDE SERPL-SCNC: 96 MMOL/L (ref 100–108)
CO2 SERPL-SCNC: 26 MMOL/L (ref 21–32)
CREAT SERPL-MCNC: 0.54 MG/DL (ref 0.6–1.3)
EOSINOPHIL # BLD AUTO: 0.06 THOUSAND/ΜL (ref 0–0.61)
EOSINOPHIL NFR BLD AUTO: 1 % (ref 0–6)
ERYTHROCYTE [DISTWIDTH] IN BLOOD BY AUTOMATED COUNT: 15.6 % (ref 11.6–15.1)
FERRITIN SERPL-MCNC: 13 NG/ML (ref 8–388)
GFR SERPL CREATININE-BSD FRML MDRD: 119 ML/MIN/1.73SQ M
GLUCOSE SERPL-MCNC: 95 MG/DL (ref 65–140)
HCT VFR BLD AUTO: 30.1 % (ref 36.5–49.3)
HGB BLD-MCNC: 10.3 G/DL (ref 12–17)
HGB BLD-MCNC: 9.5 G/DL (ref 12–17)
IMM GRANULOCYTES # BLD AUTO: 0.02 THOUSAND/UL (ref 0–0.2)
IMM GRANULOCYTES NFR BLD AUTO: 0 % (ref 0–2)
INR PPP: 1.1 (ref 0.84–1.19)
IRON SATN MFR SERPL: 4 %
IRON SERPL-MCNC: 16 UG/DL (ref 65–175)
LACTATE SERPL-SCNC: 1.8 MMOL/L (ref 0.5–2)
LACTATE SERPL-SCNC: 2.1 MMOL/L (ref 0.5–2)
LIPASE SERPL-CCNC: 234 U/L (ref 73–393)
LYMPHOCYTES # BLD AUTO: 2.11 THOUSANDS/ΜL (ref 0.6–4.47)
LYMPHOCYTES NFR BLD AUTO: 43 % (ref 14–44)
MAGNESIUM SERPL-MCNC: 1.5 MG/DL (ref 1.6–2.6)
MCH RBC QN AUTO: 28 PG (ref 26.8–34.3)
MCHC RBC AUTO-ENTMCNC: 31.6 G/DL (ref 31.4–37.4)
MCV RBC AUTO: 89 FL (ref 82–98)
MONOCYTES # BLD AUTO: 0.74 THOUSAND/ΜL (ref 0.17–1.22)
MONOCYTES NFR BLD AUTO: 15 % (ref 4–12)
NEUTROPHILS # BLD AUTO: 1.96 THOUSANDS/ΜL (ref 1.85–7.62)
NEUTS SEG NFR BLD AUTO: 40 % (ref 43–75)
NRBC BLD AUTO-RTO: 0 /100 WBCS
PLATELET # BLD AUTO: 113 THOUSANDS/UL (ref 149–390)
PMV BLD AUTO: 10 FL (ref 8.9–12.7)
POTASSIUM SERPL-SCNC: 3.2 MMOL/L (ref 3.5–5.3)
PROT SERPL-MCNC: 7.9 G/DL (ref 6.4–8.2)
PROTHROMBIN TIME: 14.2 SECONDS (ref 11.6–14.5)
RBC # BLD AUTO: 3.39 MILLION/UL (ref 3.88–5.62)
RH BLD: POSITIVE
SARS-COV-2 RNA RESP QL NAA+PROBE: NEGATIVE
SODIUM SERPL-SCNC: 132 MMOL/L (ref 136–145)
SPECIMEN EXPIRATION DATE: NORMAL
TIBC SERPL-MCNC: 411 UG/DL (ref 250–450)
TROPONIN I SERPL-MCNC: <0.02 NG/ML
WBC # BLD AUTO: 4.93 THOUSAND/UL (ref 4.31–10.16)

## 2020-07-21 PROCEDURE — 82728 ASSAY OF FERRITIN: CPT | Performed by: NURSE PRACTITIONER

## 2020-07-21 PROCEDURE — 83550 IRON BINDING TEST: CPT | Performed by: NURSE PRACTITIONER

## 2020-07-21 PROCEDURE — 86900 BLOOD TYPING SEROLOGIC ABO: CPT | Performed by: PHYSICIAN ASSISTANT

## 2020-07-21 PROCEDURE — 99285 EMERGENCY DEPT VISIT HI MDM: CPT | Performed by: PHYSICIAN ASSISTANT

## 2020-07-21 PROCEDURE — 96375 TX/PRO/DX INJ NEW DRUG ADDON: CPT

## 2020-07-21 PROCEDURE — 83735 ASSAY OF MAGNESIUM: CPT | Performed by: PHYSICIAN ASSISTANT

## 2020-07-21 PROCEDURE — 99285 EMERGENCY DEPT VISIT HI MDM: CPT

## 2020-07-21 PROCEDURE — C9113 INJ PANTOPRAZOLE SODIUM, VIA: HCPCS | Performed by: NURSE PRACTITIONER

## 2020-07-21 PROCEDURE — 86850 RBC ANTIBODY SCREEN: CPT | Performed by: PHYSICIAN ASSISTANT

## 2020-07-21 PROCEDURE — 82272 OCCULT BLD FECES 1-3 TESTS: CPT

## 2020-07-21 PROCEDURE — NC001 PR NO CHARGE: Performed by: PHYSICIAN ASSISTANT

## 2020-07-21 PROCEDURE — C9113 INJ PANTOPRAZOLE SODIUM, VIA: HCPCS | Performed by: PHYSICIAN ASSISTANT

## 2020-07-21 PROCEDURE — 94760 N-INVAS EAR/PLS OXIMETRY 1: CPT

## 2020-07-21 PROCEDURE — 83605 ASSAY OF LACTIC ACID: CPT | Performed by: PHYSICIAN ASSISTANT

## 2020-07-21 PROCEDURE — 80053 COMPREHEN METABOLIC PANEL: CPT | Performed by: PHYSICIAN ASSISTANT

## 2020-07-21 PROCEDURE — 83605 ASSAY OF LACTIC ACID: CPT | Performed by: NURSE PRACTITIONER

## 2020-07-21 PROCEDURE — 83690 ASSAY OF LIPASE: CPT | Performed by: PHYSICIAN ASSISTANT

## 2020-07-21 PROCEDURE — 71045 X-RAY EXAM CHEST 1 VIEW: CPT

## 2020-07-21 PROCEDURE — 85018 HEMOGLOBIN: CPT | Performed by: NURSE PRACTITIONER

## 2020-07-21 PROCEDURE — 86901 BLOOD TYPING SEROLOGIC RH(D): CPT | Performed by: PHYSICIAN ASSISTANT

## 2020-07-21 PROCEDURE — 94664 DEMO&/EVAL PT USE INHALER: CPT

## 2020-07-21 PROCEDURE — 85610 PROTHROMBIN TIME: CPT | Performed by: PHYSICIAN ASSISTANT

## 2020-07-21 PROCEDURE — 84484 ASSAY OF TROPONIN QUANT: CPT | Performed by: PHYSICIAN ASSISTANT

## 2020-07-21 PROCEDURE — 36415 COLL VENOUS BLD VENIPUNCTURE: CPT | Performed by: PHYSICIAN ASSISTANT

## 2020-07-21 PROCEDURE — 87635 SARS-COV-2 COVID-19 AMP PRB: CPT | Performed by: PHYSICIAN ASSISTANT

## 2020-07-21 PROCEDURE — 43235 EGD DIAGNOSTIC BRUSH WASH: CPT | Performed by: INTERNAL MEDICINE

## 2020-07-21 PROCEDURE — 83540 ASSAY OF IRON: CPT | Performed by: NURSE PRACTITIONER

## 2020-07-21 PROCEDURE — 74177 CT ABD & PELVIS W/CONTRAST: CPT

## 2020-07-21 PROCEDURE — 99223 1ST HOSP IP/OBS HIGH 75: CPT | Performed by: NURSE PRACTITIONER

## 2020-07-21 PROCEDURE — 0DJ08ZZ INSPECTION OF UPPER INTESTINAL TRACT, VIA NATURAL OR ARTIFICIAL OPENING ENDOSCOPIC: ICD-10-PCS | Performed by: INTERNAL MEDICINE

## 2020-07-21 PROCEDURE — 96365 THER/PROPH/DIAG IV INF INIT: CPT

## 2020-07-21 PROCEDURE — 85730 THROMBOPLASTIN TIME PARTIAL: CPT | Performed by: PHYSICIAN ASSISTANT

## 2020-07-21 PROCEDURE — 85025 COMPLETE CBC W/AUTO DIFF WBC: CPT | Performed by: PHYSICIAN ASSISTANT

## 2020-07-21 PROCEDURE — 93005 ELECTROCARDIOGRAM TRACING: CPT

## 2020-07-21 RX ORDER — MELATONIN
1000 DAILY
Status: DISCONTINUED | OUTPATIENT
Start: 2020-07-21 | End: 2020-07-22 | Stop reason: HOSPADM

## 2020-07-21 RX ORDER — THIAMINE MONONITRATE (VIT B1) 100 MG
100 TABLET ORAL DAILY
Status: DISCONTINUED | OUTPATIENT
Start: 2020-07-21 | End: 2020-07-22 | Stop reason: HOSPADM

## 2020-07-21 RX ORDER — PROPOFOL 10 MG/ML
INJECTION, EMULSION INTRAVENOUS AS NEEDED
Status: DISCONTINUED | OUTPATIENT
Start: 2020-07-21 | End: 2020-07-22 | Stop reason: SURG

## 2020-07-21 RX ORDER — FOLIC ACID 1 MG/1
1 TABLET ORAL DAILY
Status: DISCONTINUED | OUTPATIENT
Start: 2020-07-21 | End: 2020-07-22 | Stop reason: HOSPADM

## 2020-07-21 RX ORDER — PANTOPRAZOLE SODIUM 40 MG/1
40 INJECTION, POWDER, FOR SOLUTION INTRAVENOUS EVERY 12 HOURS SCHEDULED
Status: DISCONTINUED | OUTPATIENT
Start: 2020-07-21 | End: 2020-07-22 | Stop reason: HOSPADM

## 2020-07-21 RX ORDER — ONDANSETRON 2 MG/ML
4 INJECTION INTRAMUSCULAR; INTRAVENOUS ONCE AS NEEDED
Status: DISCONTINUED | OUTPATIENT
Start: 2020-07-21 | End: 2020-07-22 | Stop reason: HOSPADM

## 2020-07-21 RX ORDER — SODIUM CHLORIDE, SODIUM LACTATE, POTASSIUM CHLORIDE, CALCIUM CHLORIDE 600; 310; 30; 20 MG/100ML; MG/100ML; MG/100ML; MG/100ML
INJECTION, SOLUTION INTRAVENOUS CONTINUOUS PRN
Status: DISCONTINUED | OUTPATIENT
Start: 2020-07-21 | End: 2020-07-22 | Stop reason: SURG

## 2020-07-21 RX ORDER — POTASSIUM CHLORIDE 20 MEQ/1
40 TABLET, EXTENDED RELEASE ORAL ONCE
Status: COMPLETED | OUTPATIENT
Start: 2020-07-21 | End: 2020-07-21

## 2020-07-21 RX ORDER — ACETAMINOPHEN 325 MG/1
650 TABLET ORAL EVERY 6 HOURS PRN
Status: DISCONTINUED | OUTPATIENT
Start: 2020-07-21 | End: 2020-07-22 | Stop reason: HOSPADM

## 2020-07-21 RX ORDER — MAGNESIUM SULFATE HEPTAHYDRATE 40 MG/ML
2 INJECTION, SOLUTION INTRAVENOUS ONCE
Status: COMPLETED | OUTPATIENT
Start: 2020-07-21 | End: 2020-07-21

## 2020-07-21 RX ORDER — LISINOPRIL 10 MG/1
10 TABLET ORAL DAILY
Status: DISCONTINUED | OUTPATIENT
Start: 2020-07-21 | End: 2020-07-22 | Stop reason: HOSPADM

## 2020-07-21 RX ORDER — AMITRIPTYLINE HYDROCHLORIDE 25 MG/1
25 TABLET, FILM COATED ORAL
Status: DISCONTINUED | OUTPATIENT
Start: 2020-07-21 | End: 2020-07-22 | Stop reason: HOSPADM

## 2020-07-21 RX ORDER — LEVETIRACETAM 500 MG/1
1000 TABLET ORAL EVERY 12 HOURS SCHEDULED
Status: DISCONTINUED | OUTPATIENT
Start: 2020-07-21 | End: 2020-07-22 | Stop reason: HOSPADM

## 2020-07-21 RX ORDER — GABAPENTIN 100 MG/1
100 CAPSULE ORAL 3 TIMES DAILY
Status: DISCONTINUED | OUTPATIENT
Start: 2020-07-21 | End: 2020-07-22 | Stop reason: HOSPADM

## 2020-07-21 RX ORDER — ALBUTEROL SULFATE 2.5 MG/3ML
2.5 SOLUTION RESPIRATORY (INHALATION) EVERY 6 HOURS PRN
Status: DISCONTINUED | OUTPATIENT
Start: 2020-07-21 | End: 2020-07-21

## 2020-07-21 RX ORDER — SODIUM CHLORIDE, SODIUM LACTATE, POTASSIUM CHLORIDE, CALCIUM CHLORIDE 600; 310; 30; 20 MG/100ML; MG/100ML; MG/100ML; MG/100ML
125 INJECTION, SOLUTION INTRAVENOUS CONTINUOUS
Status: DISCONTINUED | OUTPATIENT
Start: 2020-07-21 | End: 2020-07-21

## 2020-07-21 RX ORDER — ALBUTEROL SULFATE 90 UG/1
2 AEROSOL, METERED RESPIRATORY (INHALATION) EVERY 4 HOURS PRN
Status: DISCONTINUED | OUTPATIENT
Start: 2020-07-21 | End: 2020-07-22 | Stop reason: HOSPADM

## 2020-07-21 RX ORDER — FUROSEMIDE 20 MG/1
20 TABLET ORAL DAILY
Status: DISCONTINUED | OUTPATIENT
Start: 2020-07-21 | End: 2020-07-22 | Stop reason: HOSPADM

## 2020-07-21 RX ORDER — NICOTINE 21 MG/24HR
1 PATCH, TRANSDERMAL 24 HOURS TRANSDERMAL DAILY
Status: DISCONTINUED | OUTPATIENT
Start: 2020-07-21 | End: 2020-07-22 | Stop reason: HOSPADM

## 2020-07-21 RX ADMIN — SODIUM CHLORIDE, SODIUM LACTATE, POTASSIUM CHLORIDE, AND CALCIUM CHLORIDE: .6; .31; .03; .02 INJECTION, SOLUTION INTRAVENOUS at 14:20

## 2020-07-21 RX ADMIN — FOLIC ACID 1 MG: 1 TABLET ORAL at 17:22

## 2020-07-21 RX ADMIN — IOHEXOL 100 ML: 350 INJECTION, SOLUTION INTRAVENOUS at 12:35

## 2020-07-21 RX ADMIN — Medication 100 MCG: at 17:22

## 2020-07-21 RX ADMIN — LEVETIRACETAM 1000 MG: 500 TABLET ORAL at 21:43

## 2020-07-21 RX ADMIN — PROPOFOL 100 MG: 10 INJECTION, EMULSION INTRAVENOUS at 14:26

## 2020-07-21 RX ADMIN — FUROSEMIDE 20 MG: 20 TABLET ORAL at 17:22

## 2020-07-21 RX ADMIN — MAGNESIUM OXIDE TAB 400 MG (240 MG ELEMENTAL MG) 800 MG: 400 (240 MG) TAB at 17:22

## 2020-07-21 RX ADMIN — SODIUM CHLORIDE 80 MG: 9 INJECTION, SOLUTION INTRAVENOUS at 12:48

## 2020-07-21 RX ADMIN — PHENYLEPHRINE HYDROCHLORIDE 200 MCG: 10 INJECTION INTRAVENOUS at 14:26

## 2020-07-21 RX ADMIN — MAGNESIUM SULFATE HEPTAHYDRATE 2 G: 40 INJECTION, SOLUTION INTRAVENOUS at 12:26

## 2020-07-21 RX ADMIN — PROPOFOL 50 MG: 10 INJECTION, EMULSION INTRAVENOUS at 14:28

## 2020-07-21 RX ADMIN — GABAPENTIN 100 MG: 100 CAPSULE ORAL at 21:44

## 2020-07-21 RX ADMIN — GABAPENTIN 100 MG: 100 CAPSULE ORAL at 17:22

## 2020-07-21 RX ADMIN — THIAMINE HCL TAB 100 MG 100 MG: 100 TAB at 17:22

## 2020-07-21 RX ADMIN — VITAMIN D, TAB 1000IU (100/BT) 1000 UNITS: 25 TAB at 17:21

## 2020-07-21 RX ADMIN — PANTOPRAZOLE SODIUM 40 MG: 40 INJECTION, POWDER, FOR SOLUTION INTRAVENOUS at 21:43

## 2020-07-21 RX ADMIN — MAGNESIUM OXIDE TAB 400 MG (240 MG ELEMENTAL MG) 800 MG: 400 (240 MG) TAB at 21:43

## 2020-07-21 RX ADMIN — POTASSIUM CHLORIDE 40 MEQ: 1500 TABLET, EXTENDED RELEASE ORAL at 12:25

## 2020-07-21 NOTE — ASSESSMENT & PLAN NOTE
· Blood pressure currently acceptable systolics in the [address]  · Managed at home on Lasix and lisinopril  · Continue home regimen  · Monitor with scheduled vitals

## 2020-07-21 NOTE — H&P
H&P- Grace Gallego 1965, 48 y o  male MRN: [id number]    Unit/Bed#: 424-01 Encounter: [id number]    Primary Care Provider: Umm Hernandez PA-C   Date and time admitted to hospital: 7/21/2020 10:44 AM      * Melena  Assessment & Plan  Background:  Presents the emergency department with reports of black stool since 7/12  Patient also complaining of nausea, epigastric pain, change in appetite, fatigue however does deny fever, chills, vomiting, diarrhea  Patient denies being on any anticoagulation  o Of note patient does have known history of duodenal ulcer with GI bleeding in the past  o Significant past medical history for GERD, Traore's esophagitis, chronic alcohol abuse  o Follows outpatient with [facility] team   EGD completed on 07/21 revealed "normal esophagus with the exception of findings consistent with prior diagnosis of breast soft BS  Normal stomach  Normal duodenum  His suture visualized from prior surgery in the duodenal bulb  No etiology for melena identified "  o Okay to resume regular diet  o If recurrent melena happens on this admission would consider colonoscopy otherwise will proceed with outpatient follow-up   CT abdomen pelvis with contrast revealing "no acute intra-abdominal pathology  There is however a new 1 6 x 1 2 x 1 0 cm hypoattenuating lesion in the uncinate process of the spleen    A nonemergent dedicated pancreatic protocol MRI is recommended for future evaluation "   Iron panel ordered, ferritin ordered, AST 66, ALT 34, INR 1 1, type and screen ordered   Occult blood stool positive in the ER   PPI    Hold NSAIDS & antiplatelets until hemostasis achieved   Continue to monitor H/H with serial labs   Up out of bed with assistance   Stool records at bedside/ intake and output   GI consult; recommendations appreciated    Lab Results   Component Value Date    HGB 9 5 (L) 07/21/2020    HGB 8 8 (L) 02/13/2020       Duodenal ulcer  Assessment & Plan  · Follows outpatient gastroenterology  · Upper endoscopy from September 2019 verified 1 ulcer at the site of the previous duodenal perforation/patch  At that time he was started on Protonix 40 mg twice daily and had repeat upper endoscopy in January 2020 demonstrating healing    · Last 2 colonoscopies without any source of bleeding  · Normal capsule endoscopy  · Gastroenterology following; input appreciated    Traore esophagus  Assessment & Plan  · Follows outpatient gastroenterology    Continuous chronic alcoholism ([place] [address] )  Assessment & Plan  · Encouraged cessation  · Follows outpatient regularly with gastroenterology    Seizures ([place] [address] )  Assessment & Plan  · Continue PTA medication  · Seizure precautions    Essential hypertension  Assessment & Plan  · Blood pressure currently acceptable systolics in the 728R  · Managed at home on Lasix and lisinopril  · Continue home regimen  · Monitor with scheduled vitals    Chronic obstructive pulmonary disease ([place] [address] )  Assessment & Plan  · Without acute exacerbation  · Managed home on nebulizers and inhalers; continue PTA medication  · Respiratory protocol    Tobacco abuse  Assessment & Plan  · Encouraged cessation  · Supplementation ordered while inpatient    Chronic GERD  Assessment & Plan  · PPI  · Gastroenterology following; input appreciated    Iron deficiency  Assessment & Plan  · Repeat iron panel ordered  · Iron panel appreciated from February 2020  · Of note patient reports he does receive IV infusions as an outpatient  · Monitor status post gastroenterology evaluation and procedure    Hepatic steatosis  Assessment & Plan  · Noted on prior imaging  · INR unremarkable  · Liver function tests unremarkable  · Thought to be secondary to alcohol consumption  · Encourage cessation of alcohol  · Follow-up outpatient with gastroenterology team    VTE Prophylaxis: low risk and contraindicated   / sequential compression device   Code Status: full code   Discussion with family: sister richie COY    Anticipated Length of Stay:  Patient will be admitted on an Inpatient basis with an anticipated length of stay of  > 2 midnights  Justification for Hospital Stay:  Likely GI bleed    Total Time for Visit, including Counseling / Coordination of Care: 45 minutes  Greater than 50% of this total time spent on direct patient counseling and coordination of care  Chief Complaint:   Melena    History of Present Illness:    Andrade Gould is a 48 y o  male with past medical history of GERD , Traore's esophagus , chronic alcoholism, duodenal ulcer, hypertension , iron deficiency anemia requiring IV Venofer infusions, hepatic steatosis and COPD who presents with reports of melena for the past 12 days  Per emergency medicine documentation patient also complained of nausea, epigastric pain, fatigue however denied any fever, chills, vomiting, or diarrhea  Patient immediately taken to OR for EGD with gastroenterology  Of note patient underwent EGD and September 2019 revealing a duodenal ulcer and subsequently went on Protonix and repeat EGD in January 2020 showed healing ulcer  Also negative capsule study and colonoscopy x2 in the past without any source of bleeding  Protonix ordered  Gastroenterology following  Will monitor following EGD and transfuse as indicated  See rest of plan as noted above  Review of Systems:    Review of Systems   Constitutional: Positive for appetite change and fatigue  Negative for chills and fever  Respiratory: Negative  Negative for shortness of breath  Cardiovascular: Negative  Negative for chest pain, palpitations and leg swelling  Gastrointestinal: Positive for abdominal distention, abdominal pain and blood in stool  Negative for anal bleeding, constipation, diarrhea and vomiting  Genitourinary: Negative  Neurological: Negative  Psychiatric/Behavioral: Negative          Past Medical and Surgical History:     Past Medical History:   Diagnosis Date    Alcohol abuse     Traore esophagus     Bowel obstruction (HCC)     Bowel perforation (HCC)     Cardiac disease     Continuous chronic alcoholism ([facility] [address] ) 10/5/2017    COPD (chronic obstructive pulmonary disease) (HCC)     History of shoulder surgery     Right shoulder    History of transfusion     Hx of cervical spine surgery     Hypertension     Incisional hernia 10/8/2017    MI, old     Mitral regurgitation     Psychiatric disorder     Seizures ([facility] [address] )        Past Surgical History:   Procedure Laterality Date    APPENDECTOMY      BACK SURGERY      CHOLECYSTECTOMY      ESOPHAGOGASTRODUODENOSCOPY N/A 11/28/2016    Procedure: ESOPHAGOGASTRODUODENOSCOPY (EGD); Surgeon: Kasia Higgins MD;  Location:  GI LAB; Service:     GALLBLADDER SURGERY      LAPAROTOMY N/A 10/25/2016    Procedure: LAPAROTOMY EXPLORATORY;  Surgeon: Michele North MD;  Location: [facility];  Service:    [facility] MOUTH SURGERY      PERCUTANEOUS PINNING FEMORAL NECK FRACTURE      SHOULDER SURGERY Right     SHOULDER SURGERY      SMALL INTESTINE SURGERY      STOMACH SURGERY      bal surgery       Meds/Allergies:    Prior to Admission medications    Medication Sig Start Date End Date Taking?  Authorizing Provider   acetaminophen (TYLENOL) 325 mg tablet Take 2 tablets every 6 hours as needed 8/5/17  Yes Gabi Klein MD   albuterol (2 5 mg/3 mL) 0 083 % nebulizer solution Take 1 vial (2 5 mg total) by nebulization every 6 (six) hours as needed for wheezing or shortness of breath 7/17/18  Yes Komal Fernando PA-C   albuterol (PROVENTIL HFA,VENTOLIN HFA) 90 mcg/act inhaler Inhale 2 puffs every 4 (four) hours as needed for wheezing 5/1/20  Yes Komal Fernando PA-C   amitriptyline (ELAVIL) 25 mg tablet Take 1 tablet (25 mg total) by mouth daily at bedtime as needed for sleep 6/13/19  Yes Komal Fernando PA-C   cholecalciferol (VITAMIN D3) 1,000 units tablet Take 1 tablet (1,000 Units total) by mouth daily 7/17/18  Yes Trellis Payment, PA-C   cyanocobalamin (VITAMIN B-12) 100 mcg tablet Take 1 tablet (100 mcg total) by mouth daily 7/17/18  Yes Trellis Payment, PA-C   folic acid (FOLVITE) 1 mg tablet Take 1 tablet (1 mg total) by mouth daily 11/4/19  Yes Trellis Payment, PA-C   furosemide (LASIX) 20 mg tablet Take 1 tablet (20 mg total) by mouth daily 9/27/19  Yes Eusebia Rasmussen DO   gabapentin (NEURONTIN) 100 mg capsule Take 1 capsule (100 mg total) by mouth 3 (three) times a day 11/4/19  Yes Trellis Payment, PA-C   levETIRAcetam (KEPPRA) 1000 MG tablet TAKE 1 TABLET BY MOUTH EVERY 12 HOURS 4/18/20  Yes Trellis Payment, PA-C   lisinopril (ZESTRIL) 10 mg tablet Take 1 tablet (10 mg total) by mouth daily 9/20/19  Yes Trellis Payment, PA-C   magnesium oxide (MAG-OX) 400 mg Take 2 tablets (800 mg total) by mouth 3 (three) times a day 7/30/18  Yes Trellis Payment, PA-C   Multiple Vitamin (TAB-A-BONI PO) Take 1 tablet by mouth daily   Yes Historical Provider, MD   pantoprazole (PROTONIX) 40 mg tablet Take 1 tablet (40 mg total) by mouth 2 (two) times a day before meals 12/12/19  Yes Humberto Mijares PA-C   sodium chloride 1 g tablet Take 1 tablet (1 g total) by mouth 3 (three) times a day 7/17/18  Yes Trellis Payment, PA-C   thiamine 100 MG tablet Take 1 tablet (100 mg total) by mouth daily 11/4/19  Yes Trellis Payment, PA-ANGEL   traMADol (ULTRAM) 50 mg tablet Take 1 tablet (50 mg total) by mouth every 6 (six) hours as needed for moderate pain for up to 20 doses 11/12/19  Yes Sierra Antonio MD     I have reviewed home medications using allscripts  Allergies:    Allergies   Allergen Reactions    Cholestatin        Social History:     Marital Status: Single   Patient Pre-hospital Level of Mobility:  Ambulatory without the use of aids  Patient Pre-hospital Diet Restrictions:  None  Substance Use History:   Social History     Substance and Sexual Activity   Alcohol Use Yes    Alcohol/week: 6 0 standard drinks    Types: 6 Cans of beer per week    Frequency: 4 or more times a week    Comment: 6 12oz cans a day     Social History     Tobacco Use   Smoking Status Current Every Day Smoker    Packs/day: 2 00    Years: 15 00    Pack years: 30 00    Types: Cigarettes   Smokeless Tobacco Never Used     Social History     Substance and Sexual Activity   Drug Use No       Family History:    Family History   Problem Relation Age of Onset    Breast cancer Mother     Prostate cancer Father     Skin cancer Brother        Physical Exam:     Vitals:   Blood Pressure: 135/98 (07/21/20 1525)  Pulse: 94 (07/21/20 1525)  Temperature: 98 °F (36 7 °C) (07/21/20 1525)  Temp Source: Temporal (07/21/20 1049)  Respirations: 18 (07/21/20 1525)  Height: 5' 5" (165 1 cm) (07/21/20 1049)  Weight - Scale: 59 1 kg (130 lb 4 7 oz) (07/21/20 1513)  SpO2: 95 % (07/21/20 1525)    Physical Exam   Constitutional: He is oriented to person, place, and time  He appears well-nourished  He is cooperative  No distress  Cardiovascular: Normal rate, regular rhythm, normal heart sounds and intact distal pulses  No murmur heard  Pulses:       Radial pulses are 2+ on the right side, and 2+ on the left side  Dorsalis pedis pulses are 2+ on the right side, and 2+ on the left side  Posterior tibial pulses are 2+ on the right side, and 2+ on the left side  No peripheral edema noted  Pulmonary/Chest: Effort normal and breath sounds normal  No respiratory distress  He has no wheezes  Abdominal: Soft  Bowel sounds are normal  He exhibits no distension  There is no tenderness  Neurological: He is alert and oriented to person, place, and time  Skin: Skin is warm and dry  Capillary refill takes less than 2 seconds  He is not diaphoretic  Psychiatric: He has a normal mood and affect  His speech is normal and behavior is normal  Judgment normal    Vitals reviewed  Additional Data:     Lab Results: I have personally reviewed pertinent reports        Results from last 7 days   Lab Units 07/21/20  1145   WBC Thousand/uL 4 93   HEMOGLOBIN g/dL 9 5*   HEMATOCRIT % 30 1*   PLATELETS Thousands/uL 113*   NEUTROS PCT % 40*   LYMPHS PCT % 43   MONOS PCT % 15*   EOS PCT % 1     Results from last 7 days   Lab Units 07/21/20  1145   SODIUM mmol/L 132*   POTASSIUM mmol/L 3 2*   CHLORIDE mmol/L 96*   CO2 mmol/L 26   BUN mg/dL 10   CREATININE mg/dL 0 54*   ANION GAP mmol/L 10   CALCIUM mg/dL 8 7   ALBUMIN g/dL 3 7   TOTAL BILIRUBIN mg/dL 0 30   ALK PHOS U/L 100   ALT U/L 34   AST U/L 66*   GLUCOSE RANDOM mg/dL 95     Results from last 7 days   Lab Units 07/21/20  1145   INR  1 10             Results from last 7 days   Lab Units 07/21/20  1145   LACTIC ACID mmol/L 2 1*       Imaging: I have personally reviewed pertinent reports  XR chest 1 view portable   Final Result by Eve Meyer MD (07/21 1423)      No acute cardiopulmonary disease  Workstation performed: [id number]         CT abdomen pelvis with contrast   Final Result by Michael Mcclure MD (07/21 1310)      No acute intra-abdominal pathology  New 1 6 x 1 2 x 1 0 cm hypoattenuating lesion in the uncinate process of the spleen  A nonemergent dedicated pancreatic protocol MRI is recommended for further evaluation  Few healed bilateral rib fractures  Osteopenia, advanced for stated age  The study was marked in EPIC for significant notification  Workstation performed: [id number]             EKG, Pathology, and Other Studies Reviewed on Admission:   · EKG:  Reviewed and unchanged since prior study  [facility] Yes     ** Please Note: This note has been constructed using a voice recognition system   **

## 2020-07-21 NOTE — ED PROVIDER NOTES
History  Chief Complaint   Patient presents with    Black or Bloody Stool     History of Anemia and iron infusion  c/o weakness  Black stools since last sunday  48year old male presents for evaluation of black stools  He notes symptoms started last Sunday (10 days ago)  He notes he's had fatigue, decreased appetite, nausea and some mild epigastric discomfort which he describes as bloating  Denies abdominal pain  He notes some constipation or less frequent bowel movements than normal   Denies vomiting or diarrhea  Denies fever, chills  He notes symptoms similar to prior episode of GI bleeding when he had issues with his duodenal ulcer  Denies aspirin or blood thinners  Denies use of NSAIDs  Denies fever, chills  Admits to slight cough and wheezing which he contributes to his smoking and COPD  Denies recent travel  Denies sick contacts  PMH includes GERD, Traore's esophagitis, duodenal ulcer, alcohol use, seizures, COPD, HTN, CAD  Pt notes he's had both prior upper and lower endoscopies within the past year  History provided by:  Patient and medical records   used: No    Black or Bloody Stool   Quality:  Black and tarry  Duration:  10 days  Chronicity:  Recurrent  Context: constipation and spontaneously    Context: not diarrhea, not hemorrhoids, not rectal injury and not rectal pain    Similar prior episodes: yes    Associated symptoms: no abdominal pain, no dizziness, no fever, no hematemesis, no light-headedness, no recent illness and no vomiting    Risk factors: liver disease    Risk factors: no anticoagulant use, no NSAID use and no steroid use        Prior to Admission Medications   Prescriptions Last Dose Informant Patient Reported? Taking?    Multiple Vitamin (TAB-A-BONI PO)  Self Yes Yes   Sig: Take 1 tablet by mouth daily   acetaminophen (TYLENOL) 325 mg tablet  Self No Yes   Sig: Take 2 tablets every 6 hours as needed   albuterol (2 5 mg/3 mL) 0 083 % nebulizer solution  Self No Yes   Sig: Take 1 vial (2 5 mg total) by nebulization every 6 (six) hours as needed for wheezing or shortness of breath   albuterol (PROVENTIL HFA,VENTOLIN HFA) 90 mcg/act inhaler   No Yes   Sig: Inhale 2 puffs every 4 (four) hours as needed for wheezing   amitriptyline (ELAVIL) 25 mg tablet  Self No Yes   Sig: Take 1 tablet (25 mg total) by mouth daily at bedtime as needed for sleep   cholecalciferol (VITAMIN D3) 1,000 units tablet  Self No Yes   Sig: Take 1 tablet (1,000 Units total) by mouth daily   cyanocobalamin (VITAMIN B-12) 100 mcg tablet  Self No Yes   Sig: Take 1 tablet (100 mcg total) by mouth daily   folic acid (FOLVITE) 1 mg tablet  Self No Yes   Sig: Take 1 tablet (1 mg total) by mouth daily   furosemide (LASIX) 20 mg tablet  Self No Yes   Sig: Take 1 tablet (20 mg total) by mouth daily   gabapentin (NEURONTIN) 100 mg capsule  Self No Yes   Sig: Take 1 capsule (100 mg total) by mouth 3 (three) times a day   levETIRAcetam (KEPPRA) 1000 MG tablet   No Yes   Sig: TAKE 1 TABLET BY MOUTH EVERY 12 HOURS   lisinopril (ZESTRIL) 10 mg tablet  Self No Yes   Sig: Take 1 tablet (10 mg total) by mouth daily   magnesium oxide (MAG-OX) 400 mg  Self No Yes   Sig: Take 2 tablets (800 mg total) by mouth 3 (three) times a day   pantoprazole (PROTONIX) 40 mg tablet  Self No Yes   Sig: Take 1 tablet (40 mg total) by mouth 2 (two) times a day before meals   sodium chloride 1 g tablet  Self No Yes   Sig: Take 1 tablet (1 g total) by mouth 3 (three) times a day   thiamine 100 MG tablet  Self No Yes   Sig: Take 1 tablet (100 mg total) by mouth daily   traMADol (ULTRAM) 50 mg tablet  Self No Yes   Sig: Take 1 tablet (50 mg total) by mouth every 6 (six) hours as needed for moderate pain for up to 20 doses      Facility-Administered Medications: None       Past Medical History:   Diagnosis Date    Alcohol abuse     Traore esophagus     Bowel obstruction (HCC)     Bowel [facility]ation ([facility] [address]     Cardiac disease  Continuous chronic alcoholism ([facility] [address] 10/5/2017    COPD (chronic obstructive pulmonary disease) (HCC)     History of shoulder surgery     Right shoulder    History of transfusion     Hx of cervical spine surgery     Hypertension     Incisional hernia 10/8/2017    MI, old     Mitral regurgitation     Psychiatric disorder     Seizures ([facility] [address] )        Past Surgical History:   Procedure Laterality Date    APPENDECTOMY      BACK SURGERY      CHOLECYSTECTOMY      ESOPHAGOGASTRODUODENOSCOPY N/A 11/28/2016    Procedure: ESOPHAGOGASTRODUODENOSCOPY (EGD); Surgeon: Nestor Almazan MD;  Location:  GI LAB; Service:     GALLBLADDER SURGERY      LAPAROTOMY N/A 10/25/2016    Procedure: LAPAROTOMY EXPLORATORY;  Surgeon: Margaret Corrales MD;  Location: [facility];  Service:    [place] MOUTH SURGERY      PERCUTANEOUS PINNING FEMORAL NECK FRACTURE      SHOULDER SURGERY Right     SHOULDER SURGERY      SMALL INTESTINE SURGERY      STOMACH SURGERY      bal surgery       Family History   Problem Relation Age of Onset    Breast cancer Mother     Prostate cancer Father     Skin cancer Brother      I have reviewed and agree with the history as documented  E-Cigarette/Vaping    E-Cigarette Use Former User      E-Cigarette/Vaping Substances    Nicotine No     THC No     CBD No     Flavoring No     Other No     Unknown No      Social History     Tobacco Use    Smoking status: Current Every Day Smoker     Packs/day: 2 00     Years: 15 00     Pack years: 30 00     Types: Cigarettes    Smokeless tobacco: Never Used   Substance Use Topics    Alcohol use: Yes     Alcohol/week: 6 0 standard drinks     Types: 6 Cans of beer per week     Frequency: 4 or more times a week     Comment: 6 12oz cans a day    Drug use: No       Review of Systems   Constitutional: Positive for appetite change  Negative for chills, fatigue and fever  HENT: Negative  Negative for congestion, rhinorrhea and sore throat      Eyes: Negative  Negative for visual disturbance  Respiratory: Positive for cough and wheezing  Negative for shortness of breath  Cardiovascular: Negative  Negative for chest pain, palpitations and leg swelling  Gastrointestinal: Positive for abdominal distention, blood in stool, constipation and nausea  Negative for abdominal pain, diarrhea, hematemesis and vomiting  Genitourinary: Negative  Negative for dysuria, flank pain, frequency and hematuria  Musculoskeletal: Negative  Negative for back pain, myalgias and neck pain  Skin: Negative  Negative for rash  Neurological: Negative  Negative for dizziness, light-headedness, numbness and headaches  Psychiatric/Behavioral: Negative  Negative for confusion  All other systems reviewed and are negative  Physical Exam  Physical Exam   Constitutional: He is oriented to person, place, and time  He appears well-developed and well-nourished  Non-toxic appearance  No distress  HENT:   Head: Normocephalic and atraumatic  Right Ear: Hearing, tympanic membrane, external ear and ear canal normal    Left Ear: Hearing, tympanic membrane, external ear and ear canal normal    Nose: Nose normal    Mouth/Throat: Uvula is midline, oropharynx is clear and moist and mucous membranes are normal  No oropharyngeal exudate  Eyes: Pupils are equal, round, and reactive to light  Conjunctivae and EOM are normal  No scleral icterus  Neck: Trachea normal and normal range of motion  Neck supple  No JVD present  No tracheal deviation present  Cardiovascular: Regular rhythm, normal heart sounds, intact distal pulses and normal pulses  Tachycardia present  No murmur heard  Pulmonary/Chest: Effort normal  No tachypnea  No respiratory distress  He has wheezes (scattered exp wheezes)  He has no rhonchi  He has no rales  No conversational dyspnea  Pt contributes his wheezing to smoking  Abdominal: Soft   Normal appearance and bowel sounds are normal  He exhibits no distension  There is no hepatosplenomegaly  There is tenderness (very mild epigastric region) in the epigastric area  There is no rigidity, no rebound, no guarding and no CVA tenderness  No hernia  Genitourinary: Rectal exam shows guaiac positive stool  Rectal exam shows no external hemorrhoid, no internal hemorrhoid, no fissure, no mass, no tenderness and anal tone normal    Musculoskeletal: Normal range of motion  He exhibits no edema or tenderness  Neurological: He is alert and oriented to person, place, and time  No cranial nerve deficit or sensory deficit  He exhibits normal muscle tone  Gait normal  GCS eye subscore is 4  GCS verbal subscore is 5  GCS motor subscore is 6  Skin: Skin is warm and dry  Capillary refill takes less than 2 seconds  No rash noted  He is not diaphoretic  No cyanosis  Nails show no clubbing  Psychiatric: He has a normal mood and affect  His speech is normal and behavior is normal    Nursing note and vitals reviewed        Vital Signs  ED Triage Vitals [07/21/20 1049]   Temperature Pulse Respirations Blood Pressure SpO2   98 2 °F (36 8 °C) (!) 117 16 144/97 95 %      Temp Source Heart Rate Source Patient Position - Orthostatic VS BP Location FiO2 (%)   Temporal Monitor Sitting Right arm --      Pain Score       No Pain           Vitals:    07/21/20 1130 07/21/20 1200 07/21/20 1249 07/21/20 1330   BP: 132/92 129/87 156/98 126/84   Pulse: 95 90 97 90   Patient Position - Orthostatic VS: Sitting Sitting Sitting Sitting         Visual Acuity      ED Medications  Medications   magnesium sulfate 2 g/50 mL IVPB (premix) 2 g (0 g Intravenous Stopped 7/21/20 1340)   potassium chloride (K-DUR,KLOR-CON) CR tablet 40 mEq (40 mEq Oral Given 7/21/20 1225)   pantoprazole (PROTONIX) 80 mg in sodium chloride 0 9 % 100 mL IVPB (0 mg Intravenous Stopped 7/21/20 1303)   iohexol (OMNIPAQUE) 350 MG/ML injection (SINGLE-DOSE) 100 mL (100 mL Intravenous Given 7/21/20 1235)       Diagnostic Studies  Results Reviewed     Procedure Component Value Units Date/Time    Novel Coronavirus [facility] [[id number]]  (Normal) Collected:  07/21/20 1145    Lab Status:  Final result Specimen:  Nares from Nose Updated:  07/21/20 1247     SARS-CoV-2 Negative    Narrative: The specimen collection materials, transport medium, and/or testing methodology utilized in the production of these test results have been proven to be reliable in a limited validation with an abbreviated program under the Emergency Utilization Authorization provided by the FDA  Testing reported as "Presumptive positive" will be confirmed with secondary testing with a reference laboratory to ensure result accuracy  Clinical caution and judgement should be used with the interpretation of these results with consideration of the clinical impression and other laboratory testing  Testing reported as "Positive" or "Negative" has been proven to be accurate according to standard laboratory validation requirements  All testing is performed with control materials showing appropriate reactivity at standard intervals  Protime-INR [[id number]]  (Normal) Collected:  07/21/20 1145    Lab Status:  Final result Specimen:  Blood from Arm, Right Updated:  07/21/20 1223     Protime 14 2 seconds      INR 1 10    APTT [[id number]]  (Normal) Collected:  07/21/20 1145    Lab Status:  Final result Specimen:  Blood from Arm, Right Updated:  07/21/20 1223     PTT 32 seconds     Lactic acid [[id number]]  (Abnormal) Collected:  07/21/20 1145    Lab Status:  Final result Specimen:  Blood from Arm, Right Updated:  07/21/20 1217     LACTIC ACID 2 1 mmol/L     Narrative:       Result may be elevated if tourniquet was used during collection      Lactic acid 2 Hours [[id number]]     Lab Status:  No result Specimen:  Blood     Troponin I [[id number]]  (Normal) Collected:  07/21/20 1145    Lab Status:  Final result Specimen:  Blood from Arm, Right Updated:  07/21/20 1211     Troponin I <0 02 ng/mL     Comprehensive metabolic panel [[id number]]  (Abnormal) Collected:  07/21/20 1145    Lab Status:  Final result Specimen:  Blood from Arm, Right Updated:  07/21/20 1210     Sodium 132 mmol/L      Potassium 3 2 mmol/L      Chloride 96 mmol/L      CO2 26 mmol/L      ANION GAP 10 mmol/L      BUN 10 mg/dL      Creatinine 0 54 mg/dL      Glucose 95 mg/dL      Calcium 8 7 mg/dL      AST 66 U/L      ALT 34 U/L      Alkaline Phosphatase 100 U/L      Total Protein 7 9 g/dL      Albumin 3 7 g/dL      Total Bilirubin 0 30 mg/dL      eGFR 119 ml/min/1 73sq m     Narrative:       [place] guidelines for Chronic Kidney Disease (CKD):     Stage 1 with normal or high GFR (GFR > 90 mL/min/1 73 square meters)    Stage 2 Mild CKD (GFR = 60-89 mL/min/1 73 square meters)    Stage 3A Moderate CKD (GFR = 45-59 mL/min/1 73 square meters)    Stage 3B Moderate CKD (GFR = 30-44 mL/min/1 73 square meters)    Stage 4 Severe CKD (GFR = 15-29 mL/min/1 73 square meters)    Stage 5 End Stage CKD (GFR <15 mL/min/1 73 square meters)  Note: GFR calculation is accurate only with a steady state creatinine    Lipase [[id number]]  (Normal) Collected:  07/21/20 1145    Lab Status:  Final result Specimen:  Blood from Arm, Right Updated:  07/21/20 1204     Lipase 234 u/L     Magnesium [[id number]]  (Abnormal) Collected:  07/21/20 1145    Lab Status:  Final result Specimen:  Blood from Arm, Right Updated:  07/21/20 1204     Magnesium 1 5 mg/dL     CBC and differential [[id number]]  (Abnormal) Collected:  07/21/20 1145    Lab Status:  Final result Specimen:  Blood from Arm, Right Updated:  07/21/20 1153     WBC 4 93 Thousand/uL      RBC 3 39 Million/uL      Hemoglobin 9 5 g/dL      Hematocrit 30 1 %      MCV 89 fL      MCH 28 0 pg      MCHC 31 6 g/dL      RDW 15 6 %      MPV 10 0 fL      Platelets 791 Thousands/uL      nRBC 0 /100 WBCs      Neutrophils Relative 40 %      Immat GRANS % 0 % Lymphocytes Relative 43 %      Monocytes Relative 15 %      Eosinophils Relative 1 %      Basophils Relative 1 %      Neutrophils Absolute 1 96 Thousands/µL      Immature Grans Absolute 0 02 Thousand/uL      Lymphocytes Absolute 2 11 Thousands/µL      Monocytes Absolute 0 74 Thousand/µL      Eosinophils Absolute 0 06 Thousand/µL      Basophils Absolute 0 04 Thousands/µL                  CT abdomen pelvis with contrast   Final Result by Daniel Hendrix MD (07/21 1310)      No acute intra-abdominal pathology  New 1 6 x 1 2 x 1 0 cm hypoattenuating lesion in the uncinate process of the spleen  A nonemergent dedicated pancreatic protocol MRI is recommended for further evaluation  Few healed bilateral rib fractures  Osteopenia, advanced for stated age  The study was marked in EPIC for significant notification  Workstation performed: [id number]         XR chest 1 view portable    (Results Pending)              Procedures  ECG 12 Lead Documentation Only  Date/Time: 7/21/2020 11:55 AM  Performed by: Bambi Pierre PA-C  Authorized by: Bambi Pierre PA-C     Indications / Diagnosis:  Tachycardia, GI bleed  ECG reviewed by me, the ED Provider: yes    Patient location:  ED  Previous ECG:     Previous ECG:  Compared to current    Comparison ECG info:  11/12/19    Similarity:  No change    Comparison to cardiac monitor: Yes    Interpretation:     Interpretation: abnormal    Rate:     ECG rate:  90    ECG rate assessment: normal    Rhythm:     Rhythm: sinus rhythm    Ectopy:     Ectopy: none    QRS:     QRS axis:  Normal    QRS intervals:  Normal  Conduction:     Conduction: abnormal      Abnormal conduction: LAFB    ST segments:     ST segments:  Normal  T waves:     T waves: normal    Comments:      , , QT/QTc 376/459; no acute ischemic changes, no significant interval change from prior               ED Course  ED Course as of Jul 21 1350   Tue Jul 21, 2020   1057 Previous records reviewed  Was seen by GI 4/24/20  H/o duodenal ulcer, GERD, Traore's esophagitis  On protonix 40 mg BID, had repeat EGD 12/12/19 which showed healing of the ulcer  Has had 2 prior colonoscopies (9/15/19 and 12/12/19) which did not reveal a bleeding source and had a normal capsule endoscopy (1/28/20)  He follows with hematology for iron replacement - last infusion 2/11/20       1115 Hemoccult positive on bedside testing, controls intact  [address] PA-S at bedside  1154 WBC: 4 93   1154 Improved from 8 8 five months ago   Hemoglobin(!): 9 5   1155 Decreased from 210 five months ago   Platelet Count(!): 082   1216 Glucose, Random: 95   1216 Creatinine(!): 0 54   1216 BUN: 10   1216 Sodium(!): 132   1216 Will replete   Potassium(!): 3 2   1216 Chloride(!): 96   1216 CO2: 26   1216 Anion Gap: 10   1216 Calcium: 8 7   1216 Improved from 81 five months ago   AST(!): 66   1217 ALT: 34   1217 Alkaline Phosphatase: 100   1217 Total Protein: 7 9   1217 Albumin: 3 7   1217 TOTAL BILIRUBIN: 0 30   1217 eGFR: 119   1217 Lipase: 234   1217 Troponin I: <0 02   1217 Will replete magnesium and potassium  Magnesium(!): 1 5   1218 Lipase: 234   1218 LACTIC ACID(!!): 2 1   1229 INR: 1 10   1229 Protime: 14 2   1229 PTT: 32   1234 [place] Text sent to on call GI Nelwyn Sandhoff  1241 CT performed and pending interpretation  1248 EXT SARS-COV-2: Negative   4782 Per GI, asks for consult to be placed  Pt updated on results thus far  Pending CT results  1315 IMPRESSION:     No acute intra-abdominal pathology  New 1 6 x 1 2 x 1 0 cm hypoattenuating lesion in the uncinate process of the spleen  A nonemergent dedicated pancreatic protocol MRI is recommended for further evaluation  Few healed bilateral rib fractures  Osteopenia, advanced for stated age  CT abdomen pelvis with contrast   1316 Pt was updated on results  He is resting comfortably in no distress  Heart rate improved    He indicates GI came down to see him and was told they would like to "do a camera today"  He is agreeable to admission for GI consult and further observation/management  We reviewed the new lesion on the spleen and discussed need for further outpatient work up in this regards  Pt asks that we call his sister to her an update that he will be admitted  [address] [facility] Text sent to on call MIA Wyman  56 Per MIA, has call out to GI and waiting to talk to them  1333 Per GI, they are going to try and do the EGD today here  46 [facility] was updated that GI wants to do EGD here today  [facility] Per Dr Katerin Wyman, accepts for full inpatient admission  1337 Pt in agreeance with admission/treatment plan  OR staff to the department to  patient  Pt's sister Edwina Galindo was updated per pt request   All questions answered  Plan to admit for GI consult/EGD due to melena  US AUDIT      Most Recent Value   Initial Alcohol Screen: US AUDIT-C    1  How often do you have a drink containing alcohol? 6 Filed at: 07/21/2020 1212   2  How many drinks containing alcohol do you have on a typical day you are drinking? 4 Filed at: 07/21/2020 1212   3a  Male UNDER 65: How often do you have five or more drinks on one occasion? 6 Filed at: 07/21/2020 1212   Audit-C Score  (!) 16 Filed at: 07/21/2020 1212   Full Alcohol Screen: US AUDIT   4  How often during the last year have you found that you were not able to stop drinking once you had started? 0 Filed at: 07/21/2020 1212   5  How often during past year have you failed to do what was normally expected of you because of drinking? 0 Filed at: 07/21/2020 1212   6  How often in past year have you needed a first drink in the morning to get yourself going after a heavy drinking session? 0 Filed at: 07/21/2020 1212   7  How often in past year have you had feeling of guilt or remorse after drinking? 0 Filed at: 07/21/2020 1212   8   How often in past year have you been unable to remember what happened night before because you had been drinking? 0 Filed at: 07/21/2020 1212   9  Have you or someone else been injured as a result of your drinking? 0 Filed at: 07/21/2020 1212   10  Has a relative, friend, doctor or other health worker been concerned about your drinking and suggested you cut down? 2 Filed at: 07/21/2020 1212   AUDIT Total Score  (!) 18 Filed at: 07/21/2020 1212            HEART Risk Score      Most Recent Value   Heart Score Risk Calculator   History  0 Filed at: 07/21/2020 1241   ECG  1 Filed at: 07/21/2020 1241   Age  1 Filed at: 07/21/2020 1241   Risk Factors  2 Filed at: 07/21/2020 1241   Troponin  0 Filed at: 07/21/2020 1241   HEART Score  4 Filed at: 07/21/2020 1241            FRANKY/DAST-10      Most Recent Value   How many times in the past year have you    Used an illegal drug or used a prescription medication for non-medical reasons?   Never Filed at: 07/21/2020 1117                                MDM  Number of Diagnoses or Management Options  Anemia: established and improving  GI bleed: new and requires workup  Hypokalemia: new and requires workup  Hypomagnesemia: new and requires workup  Lesion of spleen: new and requires workup     Amount and/or Complexity of Data Reviewed  Clinical lab tests: ordered and reviewed  Tests in the radiology section of CPT®: ordered and reviewed  Decide to obtain previous medical records or to obtain history from someone other than the patient: yes  Obtain history from someone other than the patient: yes  Review and summarize past medical records: yes  Discuss the patient with other providers: yes (Attending, GI, [facility])  Independent visualization of images, tracings, or specimens: yes    Risk of Complications, Morbidity, and/or Mortality  Presenting problems: high  Diagnostic procedures: high  Management options: high    Patient Progress  Patient progress: stable        Disposition  Final diagnoses:   GI bleed   Hypokalemia   Hypomagnesemia Lesion of spleen   Anemia     Time reflects when diagnosis was documented in both MDM as applicable and the Disposition within this note     Time User Action Codes Description Comment    7/21/2020  1:36 PM [place] Stapler Add [K92 2] GI bleed     7/21/2020  1:36 PM Merced Stapler Add [E87 6] Hypokalemia     7/21/2020  1:36 PM [place] Thea [E83 42] Hypomagnesemia     7/21/2020  1:37 PM Merced Stapler Add [D73 89] Lesion of spleen     7/21/2020  1:39 PM Merced Stapler Add [D64 9] Anemia     7/21/2020  1:43 PM Aurea Carolyn Modify [K92 2] GI bleed     7/21/2020  1:43 PM Aurea Carolyn Add [K92 1] Melena       ED Disposition     ED Disposition Condition Date/Time Comment    Admit Stable Tue Jul 21, 2020  1:35 PM Case was discussed with Dr Katerin Wyman and the patient's admission status was agreed to be Admission Status: inpatient status to the service of Dr Katerin Wyman  Follow-up Information    None         Patient's Medications   Discharge Prescriptions    No medications on file     No discharge procedures on file      PDMP Review     None          ED Provider  Electronically Signed by           Lynette Juarez PA-C  07/21/20 5046

## 2020-07-21 NOTE — CONSULTS
Consultation - [facility]) [facility]  Rachel Buckley 48 y o  male MRN: [id number]  Unit/Bed#: 830-77 Encounter: [id number]        ASSESSMENT/PLAN:   Melena  Anemia    Patient has been seen for iron deficiency anemia previously felt secondary to duodenal ulcer  He complains of persistent melena over the last several days prompting him to come to the emergency room  His hemoglobin is stable, increased from previous  BUN is within normal limits  He denies any nausea, vomiting, heartburn, reflux, abdominal pain  Given his previous history would recommend repeating upper endoscopy  Since he is NPO we will attempt to proceed today  Consults    Reason for Consult / Principal Problem: Melena    HPI: Rachel Buckley is a 48y o  year old male with a PMH of Traore's esophagus, GERD, iron deficiency anemia who presents to the emergency room with melena  Patient has been seen multiple times for his anemia  He underwent endoscopy in September showing a duodenal ulcer, likely at the site of previous g patch from prior perforated ulcer  He was started on pantoprazole 40 mg b i d  He underwent colonoscopy at the same time which showed a 20 mm flat polyp, removed in piecemeal fashion, biopsy showed tubular adenoma and was recommended to have a repeat in 6 months time  He then had a repeat endoscopy and colonoscopy in January showing as his ulcer had healed, 2 adenomatous polyps  Was then recommended he undergo a capsule endoscopy with no signs of bleeding  He was referred to Hematology and did have iron infusions  Hemoglobin on admission was 9 5 which had increased from 8 8 in February  He also has a history of elevated LFTs and hepatic steatosis on recent imaging  Fiber sure was F2  Viral hep panel was negative other workup was ordered but not performed  His AST is elevated at 66, other liver function are within normal limits  He does admit to drinking approximately 1 6 pack per day        Review of Systems: as per HPI  Review of Systems   All other systems reviewed and are negative  Historical Information   Past Medical History:   Diagnosis Date    Alcohol abuse     Traore esophagus     Bowel obstruction (HCC)     Bowel perforation (HCC)     Cardiac disease     Continuous chronic alcoholism ([facility] [address] ) 10/5/2017    COPD (chronic obstructive pulmonary disease) (HCC)     History of shoulder surgery     Right shoulder    History of transfusion     Hx of cervical spine surgery     Hypertension     Incisional hernia 10/8/2017    MI, old     Mitral regurgitation     Psychiatric disorder     Seizures ([facility] [address] )      Past Surgical History:   Procedure Laterality Date    APPENDECTOMY      BACK SURGERY      CHOLECYSTECTOMY      ESOPHAGOGASTRODUODENOSCOPY N/A 11/28/2016    Procedure: ESOPHAGOGASTRODUODENOSCOPY (EGD); Surgeon: Lexi Hyde MD;  Location:  GI LAB;   Service:    [address]      LAPAROTOMY N/A 10/25/2016    Procedure: LAPAROTOMY EXPLORATORY;  Surgeon: Anshul Rodriguez MD;  Location: [facility];  Service:     [facility]      PERCUTANEOUS PINNING FEMORAL NECK FRACTURE      SHOULDER SURGERY Right     SHOULDER SURGERY      SMALL INTESTINE SURGERY      STOMACH SURGERY      bal surgery     Social History   Social History     Substance and Sexual Activity   Alcohol Use Yes    Alcohol/week: 6 0 standard drinks    Types: 6 Cans of beer per week    Frequency: 4 or more times a week    Comment: 6 12oz cans a day     Social History     Substance and Sexual Activity   Drug Use No     Social History     Tobacco Use   Smoking Status Current Every Day Smoker    Packs/day: 2 00    Years: 15 00    Pack years: 30 00    Types: Cigarettes   Smokeless Tobacco Never Used     Family History   Problem Relation Age of Onset    Breast cancer Mother     Prostate cancer Father     Skin cancer Brother        Meds/Allergies     Medications Prior to Admission   Medication    acetaminophen (TYLENOL) 325 mg tablet    albuterol (2 5 mg/3 mL) 0 083 % nebulizer solution    albuterol (PROVENTIL HFA,VENTOLIN HFA) 90 mcg/act inhaler    amitriptyline (ELAVIL) 25 mg tablet    cholecalciferol (VITAMIN D3) 1,000 units tablet    cyanocobalamin (VITAMIN B-12) 100 mcg tablet    folic acid (FOLVITE) 1 mg tablet    furosemide (LASIX) 20 mg tablet    gabapentin (NEURONTIN) 100 mg capsule    levETIRAcetam (KEPPRA) 1000 MG tablet    lisinopril (ZESTRIL) 10 mg tablet    magnesium oxide (MAG-OX) 400 mg    Multiple Vitamin (TAB-A-BONI PO)    pantoprazole (PROTONIX) 40 mg tablet    sodium chloride 1 g tablet    thiamine 100 MG tablet    traMADol (ULTRAM) 50 mg tablet     Current Facility-Administered Medications   Medication Dose Route Frequency    acetaminophen (TYLENOL) tablet 650 mg  650 mg Oral Q6H PRN    albuterol (PROVENTIL HFA,VENTOLIN HFA) inhaler 2 puff  2 puff Inhalation Q4H PRN    albuterol inhalation solution 2 5 mg  2 5 mg Nebulization Q6H PRN    amitriptyline (ELAVIL) tablet 25 mg  25 mg Oral HS PRN    cholecalciferol (VITAMIN D3) tablet 1,000 Units  1,000 Units Oral Daily    cyanocobalamin (VITAMIN B-12) tablet 100 mcg  100 mcg Oral Daily    folic acid (FOLVITE) tablet 1 mg  1 mg Oral Daily    furosemide (LASIX) tablet 20 mg  20 mg Oral Daily    gabapentin (NEURONTIN) capsule 100 mg  100 mg Oral TID    lactated ringers infusion  125 mL/hr Intravenous Continuous    levETIRAcetam (KEPPRA) tablet 1,000 mg  1,000 mg Oral Q12H    lisinopril (ZESTRIL) tablet 10 mg  10 mg Oral Daily    magnesium oxide (MAG-OX) tablet 800 mg  800 mg Oral TID    nicotine (NICODERM CQ) 21 mg/24 hr TD 24 hr patch 1 patch  1 patch Transdermal Daily    ondansetron (ZOFRAN) injection 4 mg  4 mg Intravenous Once PRN    pantoprazole (PROTONIX) injection 40 mg  40 mg Intravenous Q12H    thiamine (VITAMIN B1) tablet 100 mg  100 mg Oral Daily     Facility-Administered Medications Ordered in Other Encounters   Medication Dose Route Frequency    lactated ringers infusion    Continuous PRN    phenylephrine bolus from bag    PRN    propofol (DIPRIVAN) 200 MG/20ML bolus injection   Intravenous PRN       Allergies   Allergen Reactions    Cholestatin        Objective     Blood pressure 135/98, pulse 94, temperature 98 °F (36 7 °C), resp  rate 18, height 5' 5" (1 651 m), weight 59 1 kg (130 lb 4 7 oz), SpO2 95 %  Intake/Output Summary (Last 24 hours) at 7/21/2020 1541  Last data filed at 7/21/2020 1513  Gross per 24 hour   Intake 800 ml   Output    Net 800 ml       PHYSICAL EXAM     Physical Exam   Constitutional: He is oriented to person, place, and time  He appears well-developed and well-nourished  No distress  HENT:   Head: Normocephalic and atraumatic  Eyes: Conjunctivae and EOM are normal    Neck: Normal range of motion  Cardiovascular: Normal rate and regular rhythm  Pulmonary/Chest: Effort normal and breath sounds normal    Abdominal: Soft  Bowel sounds are normal  He exhibits no distension  There is no tenderness  Musculoskeletal: Normal range of motion  Neurological: He is alert and oriented to person, place, and time  Skin: Skin is warm and dry  Psychiatric: He has a normal mood and affect   His behavior is normal        Lab Results:   CBC:   Lab Results   Component Value Date    WBC 4 93 07/21/2020    HGB 9 5 (L) 07/21/2020    HCT 30 1 (L) 07/21/2020    MCV 89 07/21/2020     (L) 07/21/2020    MCH 28 0 07/21/2020    MCHC 31 6 07/21/2020    RDW 15 6 (H) 07/21/2020    MPV 10 0 07/21/2020    NRBC 0 07/21/2020   ,   CMP:   Lab Results   Component Value Date    K 3 2 (L) 07/21/2020    CL 96 (L) 07/21/2020    CO2 26 07/21/2020    BUN 10 07/21/2020    CREATININE 0 54 (L) 07/21/2020    CALCIUM 8 7 07/21/2020    AST 66 (H) 07/21/2020    ALT 34 07/21/2020    ALKPHOS 100 07/21/2020    EGFR 119 07/21/2020   ,   Lipase:   Lab Results   Component Value Date    LIPASE 234 07/21/2020 ,  PT/INR:   Lab Results   Component Value Date    INR 1 10 07/21/2020   ,   Troponin:   Lab Results   Component Value Date    TROPONINI <0 02 07/21/2020   ,   Imaging Studies: I have personally reviewed pertinent reports  CT abd/pelvis:  No acute intra-abdominal pathology  New 1 6 x 1 2 x 1 0 cm hypoattenuating lesion in the uncinate process of the spleen  A nonemergent dedicated pancreatic protocol MRI is recommended for further evaluation  Few healed bilateral rib fractures  Osteopenia, advanced for stated age

## 2020-07-21 NOTE — RESPIRATORY THERAPY NOTE
RT Protocol Note  Walt Wong 48 y o  male MRN: [id number]  Unit/Bed#: 424-01 Encounter: [id number]    Assessment    Principal Problem:    Melena  Active Problems:    Tobacco abuse    Essential hypertension    Chronic obstructive pulmonary disease (HCC)    Hepatic steatosis    Iron deficiency    Seizures (HCC)    Continuous chronic alcoholism ([facility] )    Duodenal ulcer    Traore esophagus    Chronic GERD      Home Pulmonary Medications:  Albuterol MDI PRN       Past Medical History:   Diagnosis Date    Alcohol abuse     Traore esophagus     Bowel obstruction (HCC)     Bowel perforation (HCC)     Cardiac disease     Continuous chronic alcoholism ([place] [address] ) 10/5/2017    COPD (chronic obstructive pulmonary disease) (HCC)     History of shoulder surgery     Right shoulder    History of transfusion     Hx of cervical spine surgery     Hypertension     Incisional hernia 10/8/2017    MI, old     Mitral regurgitation     Psychiatric disorder     Seizures ([place] [address] )      Social History     Socioeconomic History    Marital status: Single     Spouse name: None    Number of children: None    Years of education: None    Highest education level: None   Occupational History    None   Social Needs    Financial resource strain: None    Food insecurity:     Worry: None     Inability: None    Transportation needs:     Medical: None     Non-medical: None   Tobacco Use    Smoking status: Current Every Day Smoker     Packs/day: 2 00     Years: 15 00     Pack years: 30 00     Types: Cigarettes    Smokeless tobacco: Never Used   Substance and Sexual Activity    Alcohol use:  Yes     Alcohol/week: 6 0 standard drinks     Types: 6 Cans of beer per week     Frequency: 4 or more times a week     Comment: 6 12oz cans a day    Drug use: No    Sexual activity: Yes   Lifestyle    Physical activity:     Days per week: None     Minutes per session: None    Stress: None   Relationships    Social connections:     Talks on phone: None     Gets together: None     Attends Alevism service: None     Active member of club or organization: None     Attends meetings of clubs or organizations: None     Relationship status: None    Intimate partner violence:     Fear of current or ex partner: None     Emotionally abused: None     Physically abused: None     Forced sexual activity: None   Other Topics Concern    None   Social History Narrative    None       Subjective    Subjective Data: Patient admitted to room 424 post EGD  Patient awake, alert, cooperative  Patient states he does smoke 2 ppd at home  HOme meds has listed Albuterol neb PRN however patient reports he has not taken them in years  No distress noted at this time  Objective    Physical Exam:   Assessment Type: Assess only  General Appearance: Alert, Awake  Respiratory Pattern: Normal  Chest Assessment: Chest expansion symmetrical  Bilateral Breath Sounds: Clear, Diminished  Cough: Non-productive, Strong  O2 Device: Room Air    Vitals:  Blood pressure 133/88, pulse 90, temperature 98 °F (36 7 °C), resp  rate 16, height 5' 5" (1 651 m), weight 59 1 kg (130 lb 4 7 oz), SpO2 96 %  Imaging and other studies: I have personally reviewed pertinent reports        O2 Device: Room Air     Plan    Respiratory Plan: Home Bronchodilator Patient pathway        Resp Comments: D/C PRN nebs, continue PRN MDI

## 2020-07-21 NOTE — ASSESSMENT & PLAN NOTE
· Without acute exacerbation  · Managed home on nebulizers and inhalers; continue PTA medication  · Respiratory protocol

## 2020-07-21 NOTE — PLAN OF CARE
Problem: PAIN - ADULT  Goal: Verbalizes/displays adequate comfort level or baseline comfort level  Description  Interventions:  - Encourage patient to monitor pain and request assistance  - Assess pain using appropriate pain scale  - Administer analgesics based on type and severity of pain and evaluate response  - Implement non-pharmacological measures as appropriate and evaluate response  - Consider cultural and social influences on pain and pain management  - Notify physician/advanced practitioner if interventions unsuccessful or patient reports new pain  Outcome: Progressing     Problem: INFECTION - ADULT  Goal: Absence or prevention of progression during hospitalization  Description  INTERVENTIONS:  - Assess and monitor for signs and symptoms of infection  - Monitor lab/diagnostic results  - Monitor all insertion sites, i e  indwelling lines, tubes, and drains  - Monitor endotracheal if appropriate and nasal secretions for changes in amount and color  - [place] appropriate cooling/warming therapies per order  - Administer medications as ordered  - Instruct and encourage patient and family to use good hand hygiene technique  - Identify and instruct in appropriate isolation precautions for identified infection/condition  Outcome: Progressing  Goal: Absence of fever/infection during neutropenic period  Description  INTERVENTIONS:  - Monitor WBC    Outcome: Progressing     Problem: SAFETY ADULT  Goal: Patient will remain free of falls  Description  INTERVENTIONS:  - Assess patient frequently for physical needs  -  Identify cognitive and physical deficits and behaviors that affect risk of falls    -  [place] fall precautions as indicated by assessment   - Educate patient/family on patient safety including physical limitations  - Instruct patient to call for assistance with activity based on assessment  - Modify environment to reduce risk of injury  - Consider OT/PT consult to assist with strengthening/mobility  Outcome: Progressing  Goal: Maintain or return to baseline ADL function  Description  INTERVENTIONS:  -  Assess patient's ability to carry out ADLs; assess patient's baseline for ADL function and identify physical deficits which impact ability to perform ADLs (bathing, care of mouth/teeth, toileting, grooming, dressing, etc )  - Assess/evaluate cause of self-care deficits   - Assess range of motion  - Assess patient's mobility; develop plan if impaired  - Assess patient's need for assistive devices and provide as appropriate  - Encourage maximum independence but intervene and supervise when necessary  - Involve family in performance of ADLs  - Assess for home care needs following discharge   - Consider OT consult to assist with ADL evaluation and planning for discharge  - Provide patient education as appropriate  Outcome: Progressing  Goal: Maintain or return mobility status to optimal level  Description  INTERVENTIONS:  - Assess patient's baseline mobility status (ambulation, transfers, stairs, etc )    - Identify cognitive and physical deficits and behaviors that affect mobility  - Identify mobility aids required to assist with transfers and/or ambulation (gait belt, sit-to-stand, lift, walker, cane, etc )  - [place] fall precautions as indicated by assessment  - Record patient progress and toleration of activity level on Mobility SBAR; progress patient to next Phase/Stage  - Instruct patient to call for assistance with activity based on assessment  - Consider rehabilitation consult to assist with strengthening/weightbearing, etc   Outcome: Progressing     Problem: DISCHARGE PLANNING  Goal: Discharge to home or other facility with appropriate resources  Description  INTERVENTIONS:  - Identify barriers to discharge w/patient and caregiver  - Arrange for needed discharge resources and transportation as appropriate  - Identify discharge learning needs (meds, wound care, etc )  - Arrange for interpretive services to assist at discharge as needed  - Refer to Case Management Department for coordinating discharge planning if the patient needs post-hospital services based on physician/advanced practitioner order or complex needs related to functional status, cognitive ability, or social support system  Outcome: Progressing     Problem: Knowledge Deficit  Goal: Patient/family/caregiver demonstrates understanding of disease process, treatment plan, medications, and discharge instructions  Description  Complete learning assessment and assess knowledge base    Interventions:  - Provide teaching at level of understanding  - Provide teaching via preferred learning methods  Outcome: Progressing     Problem: GASTROINTESTINAL - ADULT  Goal: Minimal or absence of nausea and/or vomiting  Description  INTERVENTIONS:  - Administer IV fluids if ordered to ensure adequate hydration  - Maintain NPO status until nausea and vomiting are resolved  - Nasogastric tube if ordered  - Administer ordered antiemetic medications as needed  - Provide nonpharmacologic comfort measures as appropriate  - Advance diet as tolerated, if ordered  - Consider nutrition services referral to assist patient with adequate nutrition and appropriate food choices  Outcome: Progressing  Goal: Maintains or returns to baseline bowel function  Description  INTERVENTIONS:  - Assess bowel function  - Encourage oral fluids to ensure adequate hydration  - Administer IV fluids if ordered to ensure adequate hydration  - Administer ordered medications as needed  - Encourage mobilization and activity  - Consider nutritional services referral to assist patient with adequate nutrition and appropriate food choices  Outcome: Progressing  Goal: Maintains adequate nutritional intake  Description  INTERVENTIONS:  - Monitor percentage of each meal consumed  - Identify factors contributing to decreased intake, treat as appropriate  - Assist with meals as needed  - Monitor I&O, weight, and lab values if indicated  - Obtain nutrition services referral as needed  Outcome: Progressing

## 2020-07-21 NOTE — ASSESSMENT & PLAN NOTE
· Noted on prior imaging  · INR unremarkable  · Liver function tests unremarkable  · Thought to be secondary to alcohol consumption  · Encourage cessation of alcohol  · Follow-up outpatient with gastroenterology team

## 2020-07-21 NOTE — ASSESSMENT & PLAN NOTE
· Follows outpatient gastroenterology  · Upper endoscopy from September 2019 verified 1 ulcer at the site of the previous duodenal perforation/patch  At that time he was started on Protonix 40 mg twice daily and had repeat upper endoscopy in January 2020 demonstrating healing    · Last 2 colonoscopies without any source of bleeding  · Normal capsule endoscopy  · Gastroenterology following; input appreciated

## 2020-07-21 NOTE — ED NOTES
Gastroenterology at bedside to assess patient  Elsie Ross from [address] contacting in regards to patient going to for an EGD, patient does not have an inpatient order or bed assigned  Loretta Echevarria PA-C unaware of EGD being done today        Yadiel Prescott RN  07/21/20 6723

## 2020-07-21 NOTE — ASSESSMENT & PLAN NOTE
· Repeat iron panel ordered  · Iron panel appreciated from February 2020  · Of note patient reports he does receive IV infusions as an outpatient  · Monitor status post gastroenterology evaluation and procedure

## 2020-07-21 NOTE — ASSESSMENT & PLAN NOTE
Background:  Presents the emergency department with reports of black stool since 7/12  Patient also complaining of nausea, epigastric pain, change in appetite, fatigue however does deny fever, chills, vomiting, diarrhea  Patient denies being on any anticoagulation  o Of note patient does have known history of duodenal ulcer with GI bleeding in the past  o Significant past medical history for GERD, Traore's esophagitis, chronic alcohol abuse  o Follows outpatient with [facility] gastroenterology team   EGD completed on 07/21 revealed "normal esophagus with the exception of findings consistent with prior diagnosis of breast soft BS  Normal stomach  Normal duodenum  His suture visualized from prior surgery in the duodenal bulb  No etiology for melena identified "  o Okay to resume regular diet  o If recurrent melena happens on this admission would consider colonoscopy otherwise will proceed with outpatient follow-up   CT abdomen pelvis with contrast revealing "no acute intra-abdominal pathology  There is however a new 1 6 x 1 2 x 1 0 cm hypoattenuating lesion in the uncinate process of the spleen    A nonemergent dedicated pancreatic protocol MRI is recommended for future evaluation "   Iron panel ordered, ferritin ordered, AST 66, ALT 34, INR 1 1, type and screen ordered   Occult blood stool positive in the ER   PPI    Hold NSAIDS & antiplatelets until hemostasis achieved   Continue to monitor H/H with serial labs   Up out of bed with assistance   Stool records at bedside/ intake and output   GI consult; recommendations appreciated    Lab Results   Component Value Date    HGB 9 5 (L) 07/21/2020    HGB 8 8 (L) 02/13/2020

## 2020-07-22 ENCOUNTER — TELEPHONE (OUTPATIENT)
Dept: GASTROENTEROLOGY | Facility: CLINIC | Age: 55
End: 2020-07-22

## 2020-07-22 ENCOUNTER — TRANSITIONAL CARE MANAGEMENT (OUTPATIENT)
Dept: FAMILY MEDICINE CLINIC | Facility: CLINIC | Age: 55
End: 2020-07-22

## 2020-07-22 ENCOUNTER — TELEPHONE (OUTPATIENT)
Dept: FAMILY MEDICINE CLINIC | Facility: CLINIC | Age: 55
End: 2020-07-22

## 2020-07-22 VITALS
TEMPERATURE: 99.4 F | DIASTOLIC BLOOD PRESSURE: 80 MMHG | SYSTOLIC BLOOD PRESSURE: 114 MMHG | BODY MASS INDEX: 21.71 KG/M2 | OXYGEN SATURATION: 95 % | WEIGHT: 130.29 LBS | RESPIRATION RATE: 19 BRPM | HEIGHT: 65 IN | HEART RATE: 98 BPM

## 2020-07-22 LAB
ANION GAP SERPL CALCULATED.3IONS-SCNC: 7 MMOL/L (ref 4–13)
ATRIAL RATE: 90 BPM
BASOPHILS # BLD AUTO: 0.03 THOUSANDS/ΜL (ref 0–0.1)
BASOPHILS NFR BLD AUTO: 1 % (ref 0–1)
BUN SERPL-MCNC: 8 MG/DL (ref 5–25)
CALCIUM SERPL-MCNC: 8.5 MG/DL (ref 8.3–10.1)
CHLORIDE SERPL-SCNC: 90 MMOL/L (ref 100–108)
CO2 SERPL-SCNC: 31 MMOL/L (ref 21–32)
CREAT SERPL-MCNC: 0.73 MG/DL (ref 0.6–1.3)
EOSINOPHIL # BLD AUTO: 0.04 THOUSAND/ΜL (ref 0–0.61)
EOSINOPHIL NFR BLD AUTO: 1 % (ref 0–6)
ERYTHROCYTE [DISTWIDTH] IN BLOOD BY AUTOMATED COUNT: 15.4 % (ref 11.6–15.1)
GFR SERPL CREATININE-BSD FRML MDRD: 105 ML/MIN/1.73SQ M
GLUCOSE SERPL-MCNC: 153 MG/DL (ref 65–140)
HCT VFR BLD AUTO: 27.8 % (ref 36.5–49.3)
HGB BLD-MCNC: 9.1 G/DL (ref 12–17)
IMM GRANULOCYTES # BLD AUTO: 0.01 THOUSAND/UL (ref 0–0.2)
IMM GRANULOCYTES NFR BLD AUTO: 0 % (ref 0–2)
LYMPHOCYTES # BLD AUTO: 1.2 THOUSANDS/ΜL (ref 0.6–4.47)
LYMPHOCYTES NFR BLD AUTO: 29 % (ref 14–44)
MCH RBC QN AUTO: 28.4 PG (ref 26.8–34.3)
MCHC RBC AUTO-ENTMCNC: 32.7 G/DL (ref 31.4–37.4)
MCV RBC AUTO: 87 FL (ref 82–98)
MONOCYTES # BLD AUTO: 0.63 THOUSAND/ΜL (ref 0.17–1.22)
MONOCYTES NFR BLD AUTO: 15 % (ref 4–12)
NEUTROPHILS # BLD AUTO: 2.17 THOUSANDS/ΜL (ref 1.85–7.62)
NEUTS SEG NFR BLD AUTO: 54 % (ref 43–75)
NRBC BLD AUTO-RTO: 0 /100 WBCS
P AXIS: 72 DEGREES
PLATELET # BLD AUTO: 92 THOUSANDS/UL (ref 149–390)
PMV BLD AUTO: 9.6 FL (ref 8.9–12.7)
POTASSIUM SERPL-SCNC: 3.5 MMOL/L (ref 3.5–5.3)
PR INTERVAL: 154 MS
QRS AXIS: -57 DEGREES
QRSD INTERVAL: 102 MS
QT INTERVAL: 376 MS
QTC INTERVAL: 459 MS
RBC # BLD AUTO: 3.2 MILLION/UL (ref 3.88–5.62)
SODIUM SERPL-SCNC: 128 MMOL/L (ref 136–145)
T WAVE AXIS: 69 DEGREES
VENTRICULAR RATE: 90 BPM
WBC # BLD AUTO: 4.08 THOUSAND/UL (ref 4.31–10.16)

## 2020-07-22 PROCEDURE — C9113 INJ PANTOPRAZOLE SODIUM, VIA: HCPCS | Performed by: NURSE PRACTITIONER

## 2020-07-22 PROCEDURE — 80048 BASIC METABOLIC PNL TOTAL CA: CPT | Performed by: FAMILY MEDICINE

## 2020-07-22 PROCEDURE — 99239 HOSP IP/OBS DSCHRG MGMT >30: CPT | Performed by: FAMILY MEDICINE

## 2020-07-22 PROCEDURE — 99232 SBSQ HOSP IP/OBS MODERATE 35: CPT | Performed by: INTERNAL MEDICINE

## 2020-07-22 PROCEDURE — 93010 ELECTROCARDIOGRAM REPORT: CPT | Performed by: INTERNAL MEDICINE

## 2020-07-22 PROCEDURE — 85025 COMPLETE CBC W/AUTO DIFF WBC: CPT | Performed by: FAMILY MEDICINE

## 2020-07-22 RX ORDER — NICOTINE 21 MG/24HR
1 PATCH, TRANSDERMAL 24 HOURS TRANSDERMAL DAILY
Qty: 28 PATCH | Refills: 0 | Status: SHIPPED | OUTPATIENT
Start: 2020-07-23 | End: 2020-07-30 | Stop reason: SDDI

## 2020-07-22 RX ADMIN — Medication 100 MCG: at 10:14

## 2020-07-22 RX ADMIN — IRON SUCROSE 150 MG: 20 INJECTION, SOLUTION INTRAVENOUS at 11:51

## 2020-07-22 RX ADMIN — FOLIC ACID 1 MG: 1 TABLET ORAL at 10:15

## 2020-07-22 RX ADMIN — PANTOPRAZOLE SODIUM 40 MG: 40 INJECTION, POWDER, FOR SOLUTION INTRAVENOUS at 10:13

## 2020-07-22 RX ADMIN — LEVETIRACETAM 1000 MG: 500 TABLET ORAL at 10:15

## 2020-07-22 RX ADMIN — THIAMINE HCL TAB 100 MG 100 MG: 100 TAB at 10:15

## 2020-07-22 RX ADMIN — GABAPENTIN 100 MG: 100 CAPSULE ORAL at 10:14

## 2020-07-22 RX ADMIN — MAGNESIUM OXIDE TAB 400 MG (240 MG ELEMENTAL MG) 800 MG: 400 (240 MG) TAB at 10:14

## 2020-07-22 RX ADMIN — FUROSEMIDE 20 MG: 20 TABLET ORAL at 10:14

## 2020-07-22 RX ADMIN — LISINOPRIL 10 MG: 10 TABLET ORAL at 10:14

## 2020-07-22 RX ADMIN — VITAMIN D, TAB 1000IU (100/BT) 1000 UNITS: 25 TAB at 10:15

## 2020-07-22 NOTE — NURSING NOTE
AVS printed and gone over with patient  Patient voiced understanding and had no questions at discharge  Pt left floor walking accompanied by his sister and RN in stable condition

## 2020-07-22 NOTE — ASSESSMENT & PLAN NOTE
· Marked iron deficiency redemonstrated on repeat iron panel  · Iron panel appreciated from February 2020  · Of note patient reports he does receive IV infusions as an outpatient

## 2020-07-22 NOTE — SOCIAL WORK
Met with pt to discuss role as  in helping pt to develop discharge plan and to help pt carry out their plan  Pt lives in a house with his brother, Sister in law and nieces  Pt has 3 TIFF  Pt has no DME  Pt is independent with ADL's  Pt's SORAYA does the cooking and cleaning    Pt has never had home care or any services in the home   Pt does not drive   He walks to appointments or his family drives him to appts  Pt's PCP is Juan Mccray  Pt uses the [facility] in [place]  I will continue to follow for any case management needs

## 2020-07-22 NOTE — ANESTHESIA POSTPROCEDURE EVALUATION
Post-Op Assessment Note    CV Status:  Stable  Pain Score: 1    Pain management: adequate     Mental Status:  Alert and awake   Hydration Status:  Euvolemic   PONV Controlled:  Controlled   Airway Patency:  Patent   Post Op Vitals Reviewed: Yes      Staff: Anesthesiologist, with CRNAs           BP      Temp      Pulse     Resp      SpO2

## 2020-07-22 NOTE — ANESTHESIA PREPROCEDURE EVALUATION
Review of Systems/Medical History  Patient summary reviewed  Chart reviewed      Cardiovascular  EKG reviewed, Negative cardio ROS Hypertension , Past MI > 6 months,   Comment: 2017 - normal biV systolic function, mild AS,  Pulmonary  Negative pulmonary ROS Smoker cigarette smoker  , Tobacco cessation counseling given , Pneumonia, COPD ,        GI/Hepatic  Negative GI/hepatic ROS   PUD, Liver disease , Hepatitis ,   Comment: etOH abuse     Negative  ROS        Endo/Other  Negative endo/other ROS      GYN  Negative gynecology ROS          Hematology  Negative hematology ROS Anemia ,    Comment: Chronic anemia Musculoskeletal  Negative musculoskeletal ROS        Neurology  Negative neurology ROS Seizures (on keppra) ,     Psychology   Negative psychology ROS Depression ,     Comment: etoh abuse  H/O suicide attempt         Physical Exam    Airway    Mallampati score: II  TM Distance: >3 FB  Neck ROM: full     Dental   lower dentures and upper dentures,     Cardiovascular  Comment: Negative ROS, Rhythm: regular, Rate: normal, Cardiovascular exam normal    Pulmonary  Pulmonary exam normal Breath sounds clear to auscultation,     Other Findings        Anesthesia Plan  ASA Score- 3 Emergent    Anesthesia Type- IV sedation with anesthesia with ASA Monitors  Additional Monitors:   Airway Plan:         Plan Factors-Patient not instructed to abstain from smoking on day of procedure  Patient did not smoke on day of surgery  Induction- intravenous  Postoperative Plan-     Informed Consent- Anesthetic plan and risks discussed with patient  I personally reviewed this patient with the CRNA  Discussed and agreed on the Anesthesia Plan with the CRNA  Sera Rivera

## 2020-07-22 NOTE — PROGRESS NOTES
Progress Note - Anastasiia Shelley 48 y o  male MRN: [id number]    Unit/Bed#: 075-49 Encounter: [id number]         Assessment/ Plan:  Melena  Anemia     Patient has been seen for iron deficiency anemia previously felt secondary to duodenal ulcer  He complains of persistent melena over the last several days prompting him to come to the emergency room  He underwent EGD yesterday that was WNL's  Hbg did decrease from 10 3->9 1, likely dilutional as he has not had any further BM's  He did a colonoscopy last year showing a large tubular adenoma that was removed  Also underwent video capsule in February that was within normal limits  Iron level is low despite previous iron infusions  He has a Venofer infusion today  If no further bleeding can likely discharge and follow up as an outpatient   -Follow H&H      Subjective:   Pt feels well  No abd pain  Tolerated breakfast  No BM  Objective:     Vitals: Blood pressure 114/80, pulse 98, temperature 99 4 °F (37 4 °C), temperature source Oral, resp  rate 19, height 5' 5" (1 651 m), weight 59 1 kg (130 lb 4 7 oz), SpO2 95 %  ,Body mass index is 21 68 kg/m²  Physical Exam: General appearance: alert and oriented, in no acute distress  Lungs: clear to auscultation bilaterally  Heart: regular rate and rhythm  Abdomen: soft, non-tender; bowel sounds normal; no masses,  no organomegaly  Skin: Skin color, texture, turgor normal  No rashes or lesions     Invasive Devices     Peripheral Intravenous Line            Peripheral IV 07/21/20 Left Forearm less than 1 day                Lab, Imaging and other studies: I have personally reviewed pertinent reports       CBC:   Lab Results   Component Value Date    WBC 4 08 (L) 07/22/2020    HGB 9 1 (L) 07/22/2020    HCT 27 8 (L) 07/22/2020    MCV 87 07/22/2020    PLT 92 (L) 07/22/2020    MCH 28 4 07/22/2020    MCHC 32 7 07/22/2020    RDW 15 4 (H) 07/22/2020    MPV 9 6 07/22/2020    NRBC 0 07/22/2020   ,   CMP:   Lab Results   Component Value Date    K 3 5 07/22/2020    CL 90 (L) 07/22/2020    CO2 31 07/22/2020    BUN 8 07/22/2020    CREATININE 0 73 07/22/2020    CALCIUM 8 5 07/22/2020    AST 66 (H) 07/21/2020    ALT 34 07/21/2020    ALKPHOS 100 07/21/2020    EGFR 105 07/22/2020   ,   Lipase:   Lab Results   Component Value Date    LIPASE 234 07/21/2020   ,  PT/INR:   Lab Results   Component Value Date    INR 1 10 07/21/2020   ,   Troponin:   Lab Results   Component Value Date    TROPONINI <0 02 07/21/2020   ,

## 2020-07-22 NOTE — TELEPHONE ENCOUNTER
Patient is scheduled 8/11 with Dr Dimas Alvarado  ----- Message from Maris Gurrola PA-C sent at 7/22/2020  2:37 PM EDT -----  Hospital f/u in 2-3 weeks   Milad Blackwell

## 2020-07-22 NOTE — ASSESSMENT & PLAN NOTE
Chronic with baseline around 130, likely due to alcohol use  Monitor sodium level on outpatient basis

## 2020-07-22 NOTE — ASSESSMENT & PLAN NOTE
Background:  Presents the emergency department with reports of black stool since 7/12  Patient also complaining of nausea, epigastric pain, change in appetite, fatigue however does deny fever, chills, vomiting, diarrhea  Patient denies being on any anticoagulation  o Of note patient does have known history of duodenal ulcer with GI bleeding in the past  o Significant past medical history for GERD, Traore's esophagitis, chronic alcohol abuse  o Follows outpatient with [address] [place] [address] gastroenterology team   EGD completed on 07/21 revealed "normal esophagus with the exception of findings consistent with prior diagnosis of breast soft BS  Normal stomach  Normal duodenum  His suture visualized from prior surgery in the duodenal bulb  No etiology for melena identified "  o Okay to resume regular diet  o No recurrence of melena since admission   CT abdomen pelvis with contrast revealing "no acute intra-abdominal pathology  There is however a new 1 6 x 1 2 x 1 0 cm hypoattenuating lesion in the uncinate process of the spleen    A nonemergent dedicated pancreatic protocol MRI is recommended for future evaluation "   Iron panel ordered, reviewed - marked iron deficiency - Venofer infusion 150 mg prior to discharge     PPI upon discharge    Continue to monitor CBC periodically    Outpatient f/u with GI    GI consult; recommendations appreciated    Lab Results   Component Value Date    HGB 9 1 (L) 07/22/2020    HGB 10 3 (L) 07/21/2020

## 2020-07-22 NOTE — ASSESSMENT & PLAN NOTE
· Follows outpatient gastroenterology  · Upper endoscopy from September 2019 verified 1 ulcer at the site of the previous duodenal perforation/patch  At that time he was started on Protonix 40 mg twice daily and had repeat upper endoscopy in January 2020 demonstrating healing    · Last 2 colonoscopies without any source of bleeding  · Normal capsule endoscopy  · Repeat EGD revealed "mostly normal exam"

## 2020-07-22 NOTE — SOCIAL WORK
Pt is being discharged today  Pt's family will give the patient a ride home  I In basket messaged his PCP office to call him at home for a follow up appointment  Case Management reviewed discharge planning process including the following: identifying help that is needed at home, pt's preference for discharge needs and Meds at [facility]  Reviewed with Pt that any member of the healthcare team can answer questions regarding : medications, jmportance of recognizing  Signs and symptoms of any  medical problems  Case Management also encouraged pt to follow up with all recommended appointments after discharge

## 2020-07-22 NOTE — UTILIZATION REVIEW
Initial Clinical Review    Admission: Date/Time/Statement: Admission Orders (From admission, onward)     Ordered        07/21/20 1341  Inpatient Admission  Once                   Orders Placed This Encounter   Procedures    Inpatient Admission     Standing Status:   Standing     Number of Occurrences:   1     Order Specific Question:   Admitting Physician     Answer:   Hua Pendleton [[id number]]     Order Specific Question:   Level of Care     Answer:   Med Surg [[id number]]     Order Specific Question:   Estimated length of stay     Answer:   More than 2 Midnights     Order Specific Question:   Certification     Answer:   I certify that inpatient services are medically necessary for this patient for a duration of greater than two midnights  See H&P and MD Progress Notes for additional information about the patient's course of treatment  ED Arrival Information     Expected Arrival Acuity Means of Arrival Escorted By Service Admission Type    - 7/21/2020 10:31 Urgent Walk-In Family Member Hospitalist Urgent    Arrival Complaint    black stool        Chief Complaint   Patient presents with    Black or Bloody Stool     History of Anemia and iron infusion  c/o weakness  Black stools since last sunday  Assessment/Plan:   48 yom ambulatory to er from home c/o black stools with fatigue, decreased appetite, nausea and some mild epigastric discomfort which he describes as bloating x 10 days  Hx GERD, Traore's esophagitis, duodenal ulcer, alcohol use, seizures, COPD, HTN, CAD, smoker  Pt notes he's had both prior upper and lower endoscopies within the past year  Presents tachycardic with s/s as above, guaiac +stools & epigastric tenderness  Admission work-up showing hyponatremia, hypokalemia, hypomagnesemia, elevated creatinine & lactic acid, lesion on spleen  Admitted to inpatient status for melena  Started on iv iron, iv ppi, GI consulted  Per GI:  He reports several days of dark and tarry stools    He denies abdominal pain, nausea or vomiting  He denies history of NSAID use, but does endorse heavy alcohol use  On exam he is thin, chronically ill-appearing  Abdominal exam shows mild distention  EGD today showed known Traore's esophagus  Site of his prior ulcerations showed no recurrence of ulceration and suture was visualized  Continue PPI at this time     ED Triage Vitals [07/21/20 1049]   Temperature Pulse Respirations Blood Pressure SpO2   98 2 °F (36 8 °C) (!) 117 16 144/97 95 %      Temp Source Heart Rate Source Patient Position - Orthostatic VS BP Location FiO2 (%)   Temporal Monitor Sitting Right arm --      Pain Score       No Pain        Wt Readings from Last 1 Encounters:   07/21/20 59 1 kg (130 lb 4 7 oz)     Additional Vital Signs:   07/21/20 1439  98 3 °F (36 8 °C)  96  18  106/67    100 %  6 L/min  Simple mask       07/21/20 1330    90  16  126/84  100  97 %    None (Room air)    Sitting   07/21/20 1249    97  18  156/98    96 %    None (Room air)    Sitting   07/21/20 1200    90  16  129/87    95 %    None (Room air)    Sitting   07/21/20 1130    95  17  132/92  107  97 %    None (Room air)    Sitting   07/21/20 1049  98 2 °F (36 8 °C)  117Abnormal   16  144/97    95 %        Sitting   Pertinent Labs/Diagnostic Test Results:   Results from last 7 days   Lab Units 07/21/20  1145   SARS-COV-2  Negative     Results from last 7 days   Lab Units 07/22/20  0911 07/21/20 2010 07/21/20  1145   WBC Thousand/uL 4 08*  --  4 93   HEMOGLOBIN g/dL 9 1* 10 3* 9 5*   HEMATOCRIT % 27 8*  --  30 1*   PLATELETS Thousands/uL 92*  --  113*   NEUTROS ABS Thousands/µL 2 17  --  1 96     Results from last 7 days   Lab Units 07/22/20  0911 07/21/20  1145   SODIUM mmol/L 128* 132*   POTASSIUM mmol/L 3 5 3 2*   CHLORIDE mmol/L 90* 96*   CO2 mmol/L 31 26   ANION GAP mmol/L 7 10   BUN mg/dL 8 10   CREATININE mg/dL 0 73 0 54*   EGFR ml/min/1 73sq m 105 119   CALCIUM mg/dL 8 5 8 7   MAGNESIUM mg/dL  --  1 5*     Results from last 7 days   Lab Units 07/21/20  1145   AST U/L 66*   ALT U/L 34   ALK PHOS U/L 100   TOTAL PROTEIN g/dL 7 9   ALBUMIN g/dL 3 7   TOTAL BILIRUBIN mg/dL 0 30     Results from last 7 days   Lab Units 07/22/20  0911 07/21/20  1145   GLUCOSE RANDOM mg/dL 153* 95     Results from last 7 days   Lab Units 07/21/20  1145   TROPONIN I ng/mL <0 02     Results from last 7 days   Lab Units 07/21/20  1145   PROTIME seconds 14 2   INR  1 10   PTT seconds 32     Results from last 7 days   Lab Units 07/21/20  1710 07/21/20  1145   LACTIC ACID mmol/L 1 8 2 1*     Results from last 7 days   Lab Units 07/21/20  1759   FERRITIN ng/mL 13     Results from last 7 days   Lab Units 07/21/20  1145   LIPASE u/L 234     7/21  Ct a/p= No acute intra-abdominal pathology  New 1 6 x 1 2 x 1 0 cm hypoattenuating lesion in the uncinate process of the spleen  A nonemergent dedicated pancreatic protocol MRI is recommended for further evaluation  Few healed bilateral rib fractures  Osteopenia, advanced for stated age  Cxr=No acute cardiopulmonary disease    Ekg= Normal sinus rhythm  Left anterior fascicular block  ED Treatment:   Medication Administration from 07/21/2020 1031 to 07/21/2020 1518       Date/Time Order Dose Route Action     07/21/2020 1308 magnesium sulfate 2 g/50 mL IVPB (premix) 2 g   Intravenous Restarted     07/21/2020 1226 magnesium sulfate 2 g/50 mL IVPB (premix) 2 g 2 g Intravenous New Bag     07/21/2020 1225 potassium chloride (K-DUR,KLOR-CON) CR tablet 40 mEq 40 mEq Oral Given     07/21/2020 1248 pantoprazole (PROTONIX) 80 mg in sodium chloride 0 9 % 100 mL IVPB 80 mg Intravenous New Bag     07/21/2020 1235 iohexol (OMNIPAQUE) 350 MG/ML injection (SINGLE-DOSE) 100 mL 100 mL Intravenous Given     07/21/2020 1432 lactated ringers infusion   Intravenous Anesthesia Volume Adjustment     07/21/2020 1420 lactated ringers infusion   Intravenous New Bag     07/21/2020 1437 lactated ringers infusion 125 mL/hr Intravenous Restarted        Past Medical History:   Diagnosis Date    Alcohol abuse     Traore esophagus     Bowel obstruction (HCC)     Bowel perforation (HCC)     Cardiac disease     Continuous chronic alcoholism ([facility] [address] ) 10/5/2017    COPD (chronic obstructive pulmonary disease) (HCC)     History of shoulder surgery     Right shoulder    History of transfusion     Hx of cervical spine surgery     Hypertension     Incisional hernia 10/8/2017    MI, old     Mitral regurgitation     Psychiatric disorder     Seizures ([facility] [address] )      Present on Admission:   Tobacco abuse   Chronic obstructive pulmonary disease (HCC)   Continuous chronic alcoholism (HCC)   Seizures (HCC)   Duodenal ulcer   Traore esophagus   Hepatic steatosis   Iron deficiency  Admitting Diagnosis: Melena [K92 1]  Hypokalemia [E87 6]  Hypomagnesemia [E83 42]  Anemia [D64 9]  GI bleed [K92 2]  Black stool [K92 1]  Lesion of spleen [D73 89]  Age/Sex: 48 y o  male  Admission Orders:  Contact & airborne isolation  Consult GI  Stool record at bedside  Seizure precautions    Scheduled Medications:  cholecalciferol 1,000 Units Oral Daily   cyanocobalamin 100 mcg Oral Daily   folic acid 1 mg Oral Daily   furosemide 20 mg Oral Daily   gabapentin 100 mg Oral TID   iron sucrose 150 mg Intravenous Once   levETIRAcetam 1,000 mg Oral Q12H [address]   lisinopril 10 mg Oral Daily   magnesium oxide 800 mg Oral TID   nicotine 1 patch Transdermal Daily   pantoprazole 40 mg Intravenous Q12H [address]   thiamine 100 mg Oral Daily     PRN Meds:  acetaminophen 650 mg Oral Q6H PRN   albuterol 2 puff Inhalation Q4H PRN   amitriptyline 25 mg Oral HS PRN   ondansetron 4 mg Intravenous Once PRN     Network Utilization Review Department  [email or web address]  ATTENTION: Please call with any questions or concerns to [phone] and carefully listen to the prompts so that you are directed to the right person   All voicemails are confidential   Eriberto Gonzalez all requests for admission clinical reviews, approved or denied determinations and any other requests to dedicated fax number below belonging to the campus where the patient is receiving treatment   List of dedicated fax numbers for the Facilities:  [address] [place] [address] DENIALS (Administrative/Medical Necessity) [phone]   [address]  (Maternity/NICU/Pediatrics) [phone]   Joey Livingston [phone]   Megan Woodson [phone]   Doug Tom [phone]   Anika Farrar [phone]   [address] [place] [address] [phone]   [facility]  [phone]   [address] [facility]  [address] [facility] [address] [facility] [phone]

## 2020-07-22 NOTE — DISCHARGE SUMMARY
Discharge- Leonia Mcardle 1965, 48 y o  male MRN: [id number]    Unit/Bed#: 424-01 Encounter: [id number]    Primary Care Provider: Reyes Downy, PA-C   Date and time admitted to hospital: 7/21/2020 10:44 AM        Hyponatremia  Assessment & Plan  Chronic with baseline around 130, likely due to alcohol use  Monitor sodium level on outpatient basis    * Melena  Assessment & Plan  Background:  Presents the emergency department with reports of black stool since 7/12  Patient also complaining of nausea, epigastric pain, change in appetite, fatigue however does deny fever, chills, vomiting, diarrhea  Patient denies being on any anticoagulation  o Of note patient does have known history of duodenal ulcer with GI bleeding in the past  o Significant past medical history for GERD, Traore's esophagitis, chronic alcohol abuse  o Follows outpatient with [facility] gastro[facility]ology team   EGD completed on 07/21 revealed "normal esophagus with the exception of findings consistent with prior diagnosis of breast soft BS  Normal stomach  Normal duodenum  His suture visualized from prior surgery in the duodenal bulb  No etiology for melena identified "  o Okay to resume regular diet  o No recurrence of melena since admission   CT abdomen pelvis with contrast revealing "no acute intra-abdominal pathology  There is however a new 1 6 x 1 2 x 1 0 cm hypoattenuating lesion in the uncinate process of the spleen    A nonemergent dedicated pancreatic protocol MRI is recommended for future evaluation "   Iron panel ordered, reviewed - marked iron deficiency - Venofer infusion 150 mg prior to discharge     PPI upon discharge    Continue to monitor CBC periodically    Outpatient f/u with GI    GI consult; recommendations appreciated    Lab Results   Component Value Date    HGB 9 1 (L) 07/22/2020    HGB 10 3 (L) 07/21/2020       Chronic GERD  Assessment & Plan  · PPI  · Gastroenterology following; input appreciated    Traore esophagus  Assessment & Plan  · Follows outpatient gastroenterology    Duodenal ulcer  Assessment & Plan  · Follows outpatient gastroenterology  · Upper endoscopy from September 2019 verified 1 ulcer at the site of the previous duodenal perforation/patch  At that time he was started on Protonix 40 mg twice daily and had repeat upper endoscopy in January 2020 demonstrating healing    · Last 2 colonoscopies without any source of bleeding  · Normal capsule endoscopy  · Repeat EGD revealed "mostly normal exam"    Continuous chronic alcoholism ([place] [address] )  Assessment & Plan  · Encouraged cessation  · Follows outpatient regularly with gastroenterology    Seizures [facility])  Assessment & Plan  · Continue home regimen       Iron deficiency  Assessment & Plan  · Marked iron deficiency redemonstrated on repeat iron panel  · Iron panel appreciated from February 2020  · Of note patient reports he does receive IV infusions as an outpatient    Hepatic steatosis  Assessment & Plan  · Noted on prior imaging  · INR unremarkable  · Liver function tests unremarkable  · Thought to be secondary to alcohol consumption  · Encourage cessation of alcohol  · Follow-up outpatient with gastroenterology team    Chronic obstructive pulmonary disease ([place] [address] )  Assessment & Plan  · Without acute exacerbation  · Managed home on nebulizers and inhalers; continue PTA medication      Essential hypertension  Assessment & Plan  · Continue home regimen    Tobacco abuse  Assessment & Plan  · Smoking cessation counseling         Discharging Physician / Practitioner: Oris Fleischer, MD  PCP: Danny Barron PA-C  Admission Date:   Admission Orders (From admission, onward)     Ordered        07/21/20 1341  Inpatient Admission  Once                   Discharge Date: 07/22/20    Resolved Problems  Date Reviewed: 7/22/2020    None          Consultations During Hospital Stay:  · GI    Procedures Performed:   EGD:  IMPRESSION:  Irregular Z line with findings consistent with short segment Traore's esophagus  This was previously biopsied on his last exam and was not re-biopsied today  Otherwise normal esophagus     Normal stomach      Normal duodenum  Suture visualized from prior surgery in the duodenal bulb      RECOMMENDATION:  Overall normal exam  No etiology for melena  It is unclear if patient actually bled given normal Hgb on admission  OK to resume regular diet  If recurrent melena/anemia this admission consider colonoscopy  Otherwise will plan for outpatient follow up          Barney Mccormick MD          Signed by Barney Mccormick MD on 7/21/2020  3:10 PM       Significant Findings / Test Results:   Results from last 7 days   Lab Units 07/22/20  0911   WBC Thousand/uL 4 08*   HEMOGLOBIN g/dL 9 1*   HEMATOCRIT % 27 8*   PLATELETS Thousands/uL 92*     Results from last 7 days   Lab Units 07/22/20  0911 07/21/20  1145   SODIUM mmol/L 128* 132*   CHLORIDE mmol/L 90* 96*   CO2 mmol/L 31 26   BUN mg/dL 8 10   CREATININE mg/dL 0 73 0 54*   CALCIUM mg/dL 8 5 8 7   ALK PHOS U/L  --  100   ALT U/L  --  34   AST U/L  --  66*         Incidental Findings:   · None     Test Results Pending at Discharge (will require follow up):   · EGD biopsy     Outpatient Tests Requested:  · None    Complications:  None    Reason for Admission:  [address] [facility] Course:     Anastasiia Shelley is a 48 y o  male patient with history of duodenal ulcer, hypernatremia, hypertension alcohol abuse who originally presented to the hospital on 7/21/2020 due to melena  The patient underwent an urgent EGD which revealed a healing of the duodenal ulcer and otherwise an unremarkable exam   The patient's hemoglobin remained stable without any evidence of bleeding  Patient's iron panel which demonstrated severe iron deficiency anemia patient received Venofer infusion prior to discharge  The patient remained hemodynamically stable    He is to follow-up with gastroenterology for outpatient colonoscopy  The patient was discharged and is stable and improved condition, discharge planning discussed with the patient, all questions were answered  Please see above list of diagnoses and related plan for additional information  Condition at Discharge: good     Discharge Day Visit / Exam:     Subjective:  Patient seen and examined  He reports feeling well of the visit like to go home  He has not had recurrence of melena and has not had any bowel movement since admission  He denies dizziness, shortness of breath, lightheadedness or palpitations  No overnight events  Vitals: Blood Pressure: 114/80 (07/22/20 1010)  Pulse: 98 (07/22/20 1010)  Temperature: 99 4 °F (37 4 °C) (07/22/20 0712)  Temp Source: Oral (07/22/20 9573)  Respirations: 19 (07/22/20 3951)  Height: 5' 5" (165 1 cm) (07/21/20 1049)  Weight - Scale: 59 1 kg (130 lb 4 7 oz) (07/21/20 1513)  SpO2: 95 % (07/22/20 1010)    Exam:     Physical Exam   Constitutional: He is oriented to person, place, and time  No distress  HENT:   Head: Normocephalic and atraumatic  Eyes: Conjunctivae are normal    Neck: No JVD present  Cardiovascular: Normal rate and regular rhythm  No murmur heard  Pulmonary/Chest: Effort normal  No respiratory distress  He has no wheezes  He has no rales  Abdominal: Soft  He exhibits no distension  There is no tenderness  There is no guarding  Musculoskeletal: He exhibits no edema  Neurological: He is alert and oriented to person, place, and time  Skin: Skin is warm and dry  Psychiatric: He has a normal mood and affect  Discussion with Family: patient will update family    Discharge instructions/Information to patient and family:   See after visit summary for information provided to patient and family  Provisions for Follow-Up Care:  See after visit summary for information related to follow-up care and any pertinent home health orders        Disposition:     Home    For Discharges to [facility]:   · Not Applicable to this Patient - Not Applicable to this Patient    Planned Readmission: No     Discharge Statement:  I spent 35 minutes discharging the patient  This time was spent on the day of discharge  I had direct contact with the patient on the day of discharge  Greater than 50% of the total time was spent examining patient, answering all patient questions, arranging and discussing plan of care with patient as well as directly providing post-discharge instructions  Additional time then spent on discharge activities  Discharge Medications:  See after visit summary for reconciled discharge medications provided to patient and family        ** Please Note: This note has been constructed using a voice recognition system **

## 2020-07-22 NOTE — PLAN OF CARE
Problem: PAIN - ADULT  Goal: Verbalizes/displays adequate comfort level or baseline comfort level  Description  Interventions:  - Encourage patient to monitor pain and request assistance  - Assess pain using appropriate pain scale  - Administer analgesics based on type and severity of pain and evaluate response  - Implement non-pharmacological measures as appropriate and evaluate response  - Consider cultural and social influences on pain and pain management  - Notify physician/advanced practitioner if interventions unsuccessful or patient reports new pain  Outcome: Progressing     Problem: INFECTION - ADULT  Goal: Absence or prevention of progression during hospitalization  Description  INTERVENTIONS:  - Assess and monitor for signs and symptoms of infection  - Monitor lab/diagnostic results  - Monitor all insertion sites, i e  indwelling lines, tubes, and drains  - Monitor endotracheal if appropriate and nasal secretions for changes in amount and color  - [place] appropriate cooling/warming therapies per order  - Administer medications as ordered  - Instruct and encourage patient and family to use good hand hygiene technique  - Identify and instruct in appropriate isolation precautions for identified infection/condition  Outcome: Progressing  Goal: Absence of fever/infection during neutropenic period  Description  INTERVENTIONS:  - Monitor WBC    Outcome: Progressing     Problem: SAFETY ADULT  Goal: Patient will remain free of falls  Description  INTERVENTIONS:  - Assess patient frequently for physical needs  -  Identify cognitive and physical deficits and behaviors that affect risk of falls    -  [place] fall precautions as indicated by assessment   - Educate patient/family on patient safety including physical limitations  - Instruct patient to call for assistance with activity based on assessment  - Modify environment to reduce risk of injury  - Consider OT/PT consult to assist with strengthening/mobility  Outcome: Progressing  Goal: Maintain or return to baseline ADL function  Description  INTERVENTIONS:  -  Assess patient's ability to carry out ADLs; assess patient's baseline for ADL function and identify physical deficits which impact ability to perform ADLs (bathing, care of mouth/teeth, toileting, grooming, dressing, etc )  - Assess/evaluate cause of self-care deficits   - Assess range of motion  - Assess patient's mobility; develop plan if impaired  - Assess patient's need for assistive devices and provide as appropriate  - Encourage maximum independence but intervene and supervise when necessary  - Involve family in performance of ADLs  - Assess for home care needs following discharge   - Consider OT consult to assist with ADL evaluation and planning for discharge  - Provide patient education as appropriate  Outcome: Progressing  Goal: Maintain or return mobility status to optimal level  Description  INTERVENTIONS:  - Assess patient's baseline mobility status (ambulation, transfers, stairs, etc )    - Identify cognitive and physical deficits and behaviors that affect mobility  - Identify mobility aids required to assist with transfers and/or ambulation (gait belt, sit-to-stand, lift, walker, cane, etc )  - [place] fall precautions as indicated by assessment  - Record patient progress and toleration of activity level on Mobility SBAR; progress patient to next Phase/Stage  - Instruct patient to call for assistance with activity based on assessment  - Consider rehabilitation consult to assist with strengthening/weightbearing, etc   Outcome: Progressing     Problem: DISCHARGE PLANNING  Goal: Discharge to home or other facility with appropriate resources  Description  INTERVENTIONS:  - Identify barriers to discharge w/patient and caregiver  - Arrange for needed discharge resources and transportation as appropriate  - Identify discharge learning needs (meds, wound care, etc )  - Arrange for interpretive services to assist at discharge as needed  - Refer to Case Management Department for coordinating discharge planning if the patient needs post-hospital services based on physician/advanced practitioner order or complex needs related to functional status, cognitive ability, or social support system  Outcome: Progressing     Problem: Knowledge Deficit  Goal: Patient/family/caregiver demonstrates understanding of disease process, treatment plan, medications, and discharge instructions  Description  Complete learning assessment and assess knowledge base    Interventions:  - Provide teaching at level of understanding  - Provide teaching via preferred learning methods  Outcome: Progressing     Problem: GASTROINTESTINAL - ADULT  Goal: Minimal or absence of nausea and/or vomiting  Description  INTERVENTIONS:  - Administer IV fluids if ordered to ensure adequate hydration  - Maintain NPO status until nausea and vomiting are resolved  - Nasogastric tube if ordered  - Administer ordered antiemetic medications as needed  - Provide nonpharmacologic comfort measures as appropriate  - Advance diet as tolerated, if ordered  - Consider nutrition services referral to assist patient with adequate nutrition and appropriate food choices  Outcome: Progressing  Goal: Maintains or returns to baseline bowel function  Description  INTERVENTIONS:  - Assess bowel function  - Encourage oral fluids to ensure adequate hydration  - Administer IV fluids if ordered to ensure adequate hydration  - Administer ordered medications as needed  - Encourage mobilization and activity  - Consider nutritional services referral to assist patient with adequate nutrition and appropriate food choices  Outcome: Progressing  Goal: Maintains adequate nutritional intake  Description  INTERVENTIONS:  - Monitor percentage of each meal consumed  - Identify factors contributing to decreased intake, treat as appropriate  - Assist with meals as needed  - Monitor I&O, weight, and lab values if indicated  - Obtain nutrition services referral as needed  Outcome: Progressing

## 2020-07-22 NOTE — TELEPHONE ENCOUNTER
Left message for patient to call office back to get scheduled for a TCM    ----- Message from Rolo Farris RN sent at 7/22/2020  2:19 PM EDT -----  Regarding: Follow up appointment   Patient is being discharged today from [facility]  Please call the patient at home for a follow up appointment                             Thanks                          Brandon Padron

## 2020-07-23 NOTE — UTILIZATION REVIEW
Notification of Discharge  This is a Notification of Discharge from our facility [address]  Please be advised that this patient has been discharge from our facility  Below you will find the admission and discharge date and time including the patients disposition  PRESENTATION DATE: 7/21/2020 10:44 AM  OBS ADMISSION DATE:   IP ADMISSION DATE: 7/21/20 1340   DISCHARGE DATE: 7/22/2020  2:55 PM  DISPOSITION: Home/Self Care Home/Self Care   Admission Orders listed below:  Admission Orders (From admission, onward)     Ordered        07/21/20 1341  Inpatient Admission  Once                   Please contact the UR Department if additional information is required to close this patient's authorization/case  [facility] Utilization Review Department  Main: [phone] x carefully listen to the prompts  All voicemails are confidential   [email or web address]  Send all requests for admission clinical reviews, approved or denied determinations and any other requests to dedicated fax number below belonging to the campus where the patient is receiving treatment   List of dedicated fax numbers:  [address] DENIALS (Administrative/Medical Necessity) [phone]   [address] (Maternity/NICU/Pediatrics) [phone]   [place] [phone]   Richard Gamino [phone]   Yareli Bolanos [phone]   [address] [phone]   [facility]  [phone]   [address] [facility] [address]  [address] [facility] [address] [facility] [phone]

## 2020-07-27 ENCOUNTER — APPOINTMENT (OUTPATIENT)
Dept: LAB | Facility: MEDICAL CENTER | Age: 55
End: 2020-07-27
Payer: COMMERCIAL

## 2020-07-27 DIAGNOSIS — K92.1 MELENA: ICD-10-CM

## 2020-07-27 DIAGNOSIS — R79.89 ELEVATED LFTS: ICD-10-CM

## 2020-07-27 DIAGNOSIS — K29.70 GASTRITIS: ICD-10-CM

## 2020-07-27 DIAGNOSIS — D50.9 MICROCYTIC ANEMIA: ICD-10-CM

## 2020-07-27 DIAGNOSIS — K74.00 HEPATIC FIBROSIS: ICD-10-CM

## 2020-07-27 DIAGNOSIS — D50.0 IRON DEFICIENCY ANEMIA DUE TO CHRONIC BLOOD LOSS: ICD-10-CM

## 2020-07-27 LAB
ALBUMIN SERPL BCP-MCNC: 4 G/DL (ref 3.5–5)
ALP SERPL-CCNC: 115 U/L (ref 46–116)
ALT SERPL W P-5'-P-CCNC: 54 U/L (ref 12–78)
ANION GAP SERPL CALCULATED.3IONS-SCNC: 9 MMOL/L (ref 4–13)
AST SERPL W P-5'-P-CCNC: 156 U/L (ref 5–45)
BASOPHILS # BLD AUTO: 0.09 THOUSANDS/ΜL (ref 0–0.1)
BASOPHILS NFR BLD AUTO: 2 % (ref 0–1)
BILIRUB SERPL-MCNC: 0.47 MG/DL (ref 0.2–1)
BUN SERPL-MCNC: 3 MG/DL (ref 5–25)
CALCIUM SERPL-MCNC: 9.4 MG/DL (ref 8.3–10.1)
CHLORIDE SERPL-SCNC: 97 MMOL/L (ref 100–108)
CO2 SERPL-SCNC: 26 MMOL/L (ref 21–32)
CREAT SERPL-MCNC: 0.75 MG/DL (ref 0.6–1.3)
EOSINOPHIL # BLD AUTO: 0.21 THOUSAND/ΜL (ref 0–0.61)
EOSINOPHIL NFR BLD AUTO: 5 % (ref 0–6)
ERYTHROCYTE [DISTWIDTH] IN BLOOD BY AUTOMATED COUNT: 15.8 % (ref 11.6–15.1)
FERRITIN SERPL-MCNC: 101 NG/ML (ref 8–388)
FOLATE SERPL-MCNC: 14.7 NG/ML (ref 3.1–17.5)
GFR SERPL CREATININE-BSD FRML MDRD: 104 ML/MIN/1.73SQ M
GLUCOSE SERPL-MCNC: 168 MG/DL (ref 65–140)
HCT VFR BLD AUTO: 33.5 % (ref 36.5–49.3)
HGB BLD-MCNC: 10.4 G/DL (ref 12–17)
IMM GRANULOCYTES # BLD AUTO: 0.01 THOUSAND/UL (ref 0–0.2)
IMM GRANULOCYTES NFR BLD AUTO: 0 % (ref 0–2)
INR PPP: 1.02 (ref 0.84–1.19)
IRON SATN MFR SERPL: 4 %
IRON SERPL-MCNC: 16 UG/DL (ref 65–175)
LYMPHOCYTES # BLD AUTO: 2.4 THOUSANDS/ΜL (ref 0.6–4.47)
LYMPHOCYTES NFR BLD AUTO: 52 % (ref 14–44)
MCH RBC QN AUTO: 27.7 PG (ref 26.8–34.3)
MCHC RBC AUTO-ENTMCNC: 31 G/DL (ref 31.4–37.4)
MCV RBC AUTO: 89 FL (ref 82–98)
MONOCYTES # BLD AUTO: 0.31 THOUSAND/ΜL (ref 0.17–1.22)
MONOCYTES NFR BLD AUTO: 7 % (ref 4–12)
NEUTROPHILS # BLD AUTO: 1.52 THOUSANDS/ΜL (ref 1.85–7.62)
NEUTS SEG NFR BLD AUTO: 34 % (ref 43–75)
NRBC BLD AUTO-RTO: 0 /100 WBCS
PLATELET # BLD AUTO: 201 THOUSANDS/UL (ref 149–390)
PMV BLD AUTO: 10.5 FL (ref 8.9–12.7)
POTASSIUM SERPL-SCNC: 3.4 MMOL/L (ref 3.5–5.3)
PROT SERPL-MCNC: 8.4 G/DL (ref 6.4–8.2)
PROTHROMBIN TIME: 13.4 SECONDS (ref 11.6–14.5)
RBC # BLD AUTO: 3.76 MILLION/UL (ref 3.88–5.62)
SODIUM SERPL-SCNC: 132 MMOL/L (ref 136–145)
TIBC SERPL-MCNC: 453 UG/DL (ref 250–450)
VIT B12 SERPL-MCNC: 592 PG/ML (ref 100–900)
WBC # BLD AUTO: 4.54 THOUSAND/UL (ref 4.31–10.16)

## 2020-07-27 PROCEDURE — 83550 IRON BINDING TEST: CPT

## 2020-07-27 PROCEDURE — 82607 VITAMIN B-12: CPT

## 2020-07-27 PROCEDURE — 82728 ASSAY OF FERRITIN: CPT

## 2020-07-27 PROCEDURE — 82390 ASSAY OF CERULOPLASMIN: CPT

## 2020-07-27 PROCEDURE — 36415 COLL VENOUS BLD VENIPUNCTURE: CPT

## 2020-07-27 PROCEDURE — 86235 NUCLEAR ANTIGEN ANTIBODY: CPT

## 2020-07-27 PROCEDURE — 83540 ASSAY OF IRON: CPT

## 2020-07-27 PROCEDURE — 82103 ALPHA-1-ANTITRYPSIN TOTAL: CPT

## 2020-07-27 PROCEDURE — 85610 PROTHROMBIN TIME: CPT

## 2020-07-27 PROCEDURE — 86038 ANTINUCLEAR ANTIBODIES: CPT

## 2020-07-27 PROCEDURE — 82746 ASSAY OF FOLIC ACID SERUM: CPT

## 2020-07-27 PROCEDURE — 85025 COMPLETE CBC W/AUTO DIFF WBC: CPT

## 2020-07-27 PROCEDURE — 80053 COMPREHEN METABOLIC PANEL: CPT

## 2020-07-27 PROCEDURE — 86256 FLUORESCENT ANTIBODY TITER: CPT

## 2020-07-28 LAB
A1AT SERPL-MCNC: 149 MG/DL (ref 101–187)
ACTIN IGG SERPL-ACNC: 10 UNITS (ref 0–19)
CERULOPLASMIN SERPL-MCNC: 24.1 MG/DL (ref 16–31)
MITOCHONDRIA M2 IGG SER-ACNC: <20 UNITS (ref 0–20)

## 2020-07-29 ENCOUNTER — TELEPHONE (OUTPATIENT)
Dept: GASTROENTEROLOGY | Facility: MEDICAL CENTER | Age: 55
End: 2020-07-29

## 2020-07-29 LAB — RYE IGE QN: NEGATIVE

## 2020-07-29 NOTE — TELEPHONE ENCOUNTER
----- Message from Monica Damian PA-C sent at 7/28/2020  5:01 PM EDT -----  Lana [place] pt - please let pt know blood work is normal good news

## 2020-07-30 ENCOUNTER — TELEPHONE (OUTPATIENT)
Dept: UROLOGY | Facility: MEDICAL CENTER | Age: 55
End: 2020-07-30

## 2020-07-30 ENCOUNTER — OFFICE VISIT (OUTPATIENT)
Dept: FAMILY MEDICINE CLINIC | Facility: CLINIC | Age: 55
End: 2020-07-30
Payer: COMMERCIAL

## 2020-07-30 VITALS
HEIGHT: 65 IN | OXYGEN SATURATION: 97 % | TEMPERATURE: 96.8 F | WEIGHT: 130 LBS | SYSTOLIC BLOOD PRESSURE: 110 MMHG | BODY MASS INDEX: 21.66 KG/M2 | DIASTOLIC BLOOD PRESSURE: 80 MMHG | HEART RATE: 98 BPM

## 2020-07-30 DIAGNOSIS — K22.70 BARRETT'S ESOPHAGUS WITHOUT DYSPLASIA: ICD-10-CM

## 2020-07-30 DIAGNOSIS — D73.89 LESION OF SPLEEN: ICD-10-CM

## 2020-07-30 DIAGNOSIS — E87.6 HYPOKALEMIA: ICD-10-CM

## 2020-07-30 DIAGNOSIS — F10.10 ALCOHOL ABUSE, DAILY USE: ICD-10-CM

## 2020-07-30 DIAGNOSIS — K26.9 DUODENAL ULCER: ICD-10-CM

## 2020-07-30 DIAGNOSIS — Z76.89 ENCOUNTER FOR SUPPORT AND COORDINATION OF TRANSITION OF CARE: Primary | ICD-10-CM

## 2020-07-30 DIAGNOSIS — K21.9 CHRONIC GERD: ICD-10-CM

## 2020-07-30 DIAGNOSIS — R36.1 EJACULATION BLOOD: ICD-10-CM

## 2020-07-30 DIAGNOSIS — Z72.0 TOBACCO ABUSE: Chronic | ICD-10-CM

## 2020-07-30 DIAGNOSIS — D50.0 IRON DEFICIENCY ANEMIA DUE TO CHRONIC BLOOD LOSS: ICD-10-CM

## 2020-07-30 PROCEDURE — 3008F BODY MASS INDEX DOCD: CPT | Performed by: PHYSICIAN ASSISTANT

## 2020-07-30 PROCEDURE — T1015 CLINIC SERVICE: HCPCS | Performed by: FAMILY MEDICINE

## 2020-07-30 NOTE — TELEPHONE ENCOUNTER
This is a new patient and office called to make an appointment for ejaculation blood  I did not see any openings until 9/22/20 with Georgi Saucedo in [place]  The office was fine with that date  If you think patient should be seen sooner please call him at [phone]

## 2020-07-30 NOTE — PROGRESS NOTES
Assessment/Plan:    No problem-specific Assessment & Plan notes found for this encounter  Diagnoses and all orders for this visit:    Encounter for support and coordination of transition of care    Ejaculation blood  -     Ambulatory referral to Urology; Future    Lesion of spleen  -     MRI abdomen w wo contrast; Future    Iron deficiency anemia due to chronic blood loss    Alcohol abuse, daily use    Hypokalemia    Tobacco abuse    Chronic GERD    Traore's esophagus without dysplasia    Duodenal ulcer        -follow up with GI as scheduled  MRI of pancreases ordered as recommended    -continue current medications   -advised tobacco/alcohol cessation   -referral to urology for ejaculatory blood  Patient refuses to provide UA today  "It's not in my pee "     Subjective:      Patient ID: Martir Toro is a 48 y o  male who presents for TCM  He was recently hospitalized for melena with negative EGD and colonoscopy  He has not had any episodes since his discharge  Overall he feels well  He complains of chronic epigastric pressure that is triggered by spicey foods but relieved with protonix  He is scheduled to follow up with GI on 8/11/2020  Lab work from 7/27/2020 reviewed  Hospital CT revealed a new 1 6 x 1 2 x 1 0 cm hypoattenuating lesion in the uncinate process of the spleen  He complains of blood in his ejaculation 3 days ago  No prior or previous episodes  He denies blood in the urine, dysuria, urgency, frequency  No history of STIs  He states he used a condom  No other complaints today  HPI    The following portions of the patient's history were reviewed and updated as appropriate: allergies, current medications, past family history, past medical history, past social history, past surgical history and problem list     Review of Systems   Constitutional: Negative  HENT: Negative  Eyes: Negative  Respiratory: Negative  Cardiovascular: Negative  Gastrointestinal: Negative  Endocrine: Negative  Genitourinary: Negative  Blood in ejaculatory fluid   Musculoskeletal: Negative  Skin: Negative  Allergic/Immunologic: Negative  Neurological: Negative  Hematological: Negative  Psychiatric/Behavioral: Negative  Objective:      /80   Pulse 98   Temp (!) 96 8 °F (36 °C) (Tympanic)   Ht 5' 5" (1 651 m)   Wt 59 kg (130 lb)   SpO2 97%   BMI 21 63 kg/m²          Physical Exam   Constitutional: He is oriented to person, place, and time  He appears well-developed and well-nourished  No distress  HENT:   Head: Normocephalic and atraumatic  Eyes: Pupils are equal, round, and reactive to light  Conjunctivae and EOM are normal    Neck: Normal range of motion  Neck supple  No thyromegaly present  Cardiovascular: Normal rate, regular rhythm, normal heart sounds and intact distal pulses  No murmur heard  Pulmonary/Chest: Effort normal and breath sounds normal  No respiratory distress  He has no wheezes  Abdominal: Soft  Bowel sounds are normal  He exhibits no distension  There is no tenderness  Musculoskeletal: Normal range of motion  Neurological: He is alert and oriented to person, place, and time  Skin: Skin is warm and dry  Capillary refill takes less than 2 seconds  Psychiatric: He has a normal mood and affect  His behavior is normal  Judgment and thought content normal    Vitals reviewed

## 2020-08-05 ENCOUNTER — HOSPITAL ENCOUNTER (OUTPATIENT)
Dept: MRI IMAGING | Facility: HOSPITAL | Age: 55
Discharge: HOME | End: 2020-08-05
Payer: COMMERCIAL

## 2020-08-05 DIAGNOSIS — D73.89 LESION OF SPLEEN: ICD-10-CM

## 2020-08-05 PROCEDURE — 74183 MRI ABD W/O CNTR FLWD CNTR: CPT

## 2020-08-05 PROCEDURE — A9585 GADOBUTROL INJECTION: HCPCS | Performed by: PHYSICIAN ASSISTANT

## 2020-08-05 RX ADMIN — GADOBUTROL 5 ML: 604.72 INJECTION INTRAVENOUS at 16:12

## 2020-08-20 ENCOUNTER — HOSPITAL ENCOUNTER (INPATIENT)
Facility: HOSPITAL | Age: 55
LOS: 8 days | Discharge: HOME | DRG: 282 | End: 2020-08-29
Attending: EMERGENCY MEDICINE | Admitting: INTERNAL MEDICINE
Payer: COMMERCIAL

## 2020-08-20 ENCOUNTER — APPOINTMENT (EMERGENCY)
Dept: CT IMAGING | Facility: HOSPITAL | Age: 55
DRG: 282 | End: 2020-08-20
Payer: COMMERCIAL

## 2020-08-20 DIAGNOSIS — I44.4 LEFT ANTERIOR FASCICULAR BLOCK: ICD-10-CM

## 2020-08-20 DIAGNOSIS — N32.89 BLADDER WALL THICKENING: ICD-10-CM

## 2020-08-20 DIAGNOSIS — K85.20 ALCOHOL-INDUCED ACUTE PANCREATITIS WITHOUT INFECTION OR NECROSIS: ICD-10-CM

## 2020-08-20 DIAGNOSIS — Z86.69 HX OF SEIZURE DISORDER: ICD-10-CM

## 2020-08-20 DIAGNOSIS — E87.1 HYPONATREMIA: ICD-10-CM

## 2020-08-20 DIAGNOSIS — E87.6 HYPOKALEMIA: ICD-10-CM

## 2020-08-20 DIAGNOSIS — K29.70 GASTRITIS: ICD-10-CM

## 2020-08-20 DIAGNOSIS — I77.1 CELIAC ARTERY STENOSIS (HCC): ICD-10-CM

## 2020-08-20 DIAGNOSIS — D69.6 THROMBOCYTOPENIA (HCC): ICD-10-CM

## 2020-08-20 DIAGNOSIS — E53.8 FOLIC ACID DEFICIENCY: ICD-10-CM

## 2020-08-20 DIAGNOSIS — K86.89 PANCREATIC MASS: ICD-10-CM

## 2020-08-20 DIAGNOSIS — D61.818 PANCYTOPENIA (HCC): ICD-10-CM

## 2020-08-20 DIAGNOSIS — R10.13 EPIGASTRIC PAIN: Primary | ICD-10-CM

## 2020-08-20 DIAGNOSIS — F10.10 ALCOHOL ABUSE: ICD-10-CM

## 2020-08-20 DIAGNOSIS — E83.42 HYPOMAGNESEMIA: ICD-10-CM

## 2020-08-20 DIAGNOSIS — E83.39 HYPOPHOSPHATEMIA: ICD-10-CM

## 2020-08-20 DIAGNOSIS — K59.00 CONSTIPATION, UNSPECIFIED CONSTIPATION TYPE: ICD-10-CM

## 2020-08-20 DIAGNOSIS — R74.01 TRANSAMINITIS: ICD-10-CM

## 2020-08-20 DIAGNOSIS — K85.20 ALCOHOL-INDUCED ACUTE PANCREATITIS, UNSPECIFIED COMPLICATION STATUS: ICD-10-CM

## 2020-08-20 PROBLEM — E87.8 ELECTROLYTE DEPLETION: Status: ACTIVE | Noted: 2020-08-20

## 2020-08-20 LAB
ALBUMIN SERPL BCP-MCNC: 3.6 G/DL (ref 3.5–5)
ALP SERPL-CCNC: 116 U/L (ref 46–116)
ALT SERPL W P-5'-P-CCNC: 46 U/L (ref 12–78)
AMPHETAMINES SERPL QL SCN: NEGATIVE
ANION GAP SERPL CALCULATED.3IONS-SCNC: 11 MMOL/L (ref 4–13)
APTT PPP: 33 SECONDS (ref 23–37)
AST SERPL W P-5'-P-CCNC: 105 U/L (ref 5–45)
BARBITURATES UR QL: NEGATIVE
BASOPHILS # BLD AUTO: 0.03 THOUSANDS/ΜL (ref 0–0.1)
BASOPHILS NFR BLD AUTO: 1 % (ref 0–1)
BENZODIAZ UR QL: NEGATIVE
BILIRUB SERPL-MCNC: 0.6 MG/DL (ref 0.2–1)
BUN SERPL-MCNC: 2 MG/DL (ref 5–25)
CALCIUM SERPL-MCNC: 8.4 MG/DL (ref 8.3–10.1)
CHLORIDE SERPL-SCNC: 94 MMOL/L (ref 100–108)
CO2 SERPL-SCNC: 28 MMOL/L (ref 21–32)
COCAINE UR QL: NEGATIVE
CREAT SERPL-MCNC: 0.66 MG/DL (ref 0.6–1.3)
EOSINOPHIL # BLD AUTO: 0.05 THOUSAND/ΜL (ref 0–0.61)
EOSINOPHIL NFR BLD AUTO: 1 % (ref 0–6)
ERYTHROCYTE [DISTWIDTH] IN BLOOD BY AUTOMATED COUNT: 17.2 % (ref 11.6–15.1)
ETHANOL SERPL-MCNC: 104 MG/DL (ref 0–3)
GFR SERPL CREATININE-BSD FRML MDRD: 110 ML/MIN/1.73SQ M
GLUCOSE SERPL-MCNC: 90 MG/DL (ref 65–140)
HCT VFR BLD AUTO: 30 % (ref 36.5–49.3)
HGB BLD-MCNC: 9.4 G/DL (ref 12–17)
IMM GRANULOCYTES # BLD AUTO: 0.01 THOUSAND/UL (ref 0–0.2)
IMM GRANULOCYTES NFR BLD AUTO: 0 % (ref 0–2)
INR PPP: 1.17 (ref 0.84–1.19)
LACTATE SERPL-SCNC: 1.9 MMOL/L (ref 0.5–2)
LIPASE SERPL-CCNC: 539 U/L (ref 73–393)
LYMPHOCYTES # BLD AUTO: 1.65 THOUSANDS/ΜL (ref 0.6–4.47)
LYMPHOCYTES NFR BLD AUTO: 41 % (ref 14–44)
MAGNESIUM SERPL-MCNC: 1.5 MG/DL (ref 1.6–2.6)
MCH RBC QN AUTO: 25.1 PG (ref 26.8–34.3)
MCHC RBC AUTO-ENTMCNC: 31.3 G/DL (ref 31.4–37.4)
MCV RBC AUTO: 80 FL (ref 82–98)
METHADONE UR QL: NEGATIVE
MONOCYTES # BLD AUTO: 0.51 THOUSAND/ΜL (ref 0.17–1.22)
MONOCYTES NFR BLD AUTO: 13 % (ref 4–12)
NEUTROPHILS # BLD AUTO: 1.82 THOUSANDS/ΜL (ref 1.85–7.62)
NEUTS SEG NFR BLD AUTO: 44 % (ref 43–75)
NRBC BLD AUTO-RTO: 0 /100 WBCS
NT-PROBNP SERPL-MCNC: 42 PG/ML
OPIATES UR QL SCN: POSITIVE
OXYCODONE+OXYMORPHONE UR QL SCN: NEGATIVE
PCP UR QL: NEGATIVE
PLATELET # BLD AUTO: 100 THOUSANDS/UL (ref 149–390)
PMV BLD AUTO: 9.4 FL (ref 8.9–12.7)
POTASSIUM SERPL-SCNC: 3.3 MMOL/L (ref 3.5–5.3)
PROT SERPL-MCNC: 7.8 G/DL (ref 6.4–8.2)
PROTHROMBIN TIME: 14.6 SECONDS (ref 11.6–14.5)
RBC # BLD AUTO: 3.75 MILLION/UL (ref 3.88–5.62)
SODIUM SERPL-SCNC: 133 MMOL/L (ref 136–145)
THC UR QL: NEGATIVE
TROPONIN I SERPL-MCNC: <0.02 NG/ML
WBC # BLD AUTO: 4.07 THOUSAND/UL (ref 4.31–10.16)

## 2020-08-20 PROCEDURE — 80053 COMPREHEN METABOLIC PANEL: CPT | Performed by: EMERGENCY MEDICINE

## 2020-08-20 PROCEDURE — 80320 DRUG SCREEN QUANTALCOHOLS: CPT | Performed by: HOSPITALIST

## 2020-08-20 PROCEDURE — 99285 EMERGENCY DEPT VISIT HI MDM: CPT | Performed by: EMERGENCY MEDICINE

## 2020-08-20 PROCEDURE — 99220 PR INITIAL OBSERVATION CARE/DAY 70 MINUTES: CPT | Performed by: HOSPITALIST

## 2020-08-20 PROCEDURE — 83605 ASSAY OF LACTIC ACID: CPT | Performed by: EMERGENCY MEDICINE

## 2020-08-20 PROCEDURE — 96361 HYDRATE IV INFUSION ADD-ON: CPT

## 2020-08-20 PROCEDURE — G1004 CDSM NDSC: HCPCS

## 2020-08-20 PROCEDURE — 93005 ELECTROCARDIOGRAM TRACING: CPT

## 2020-08-20 PROCEDURE — 83735 ASSAY OF MAGNESIUM: CPT | Performed by: EMERGENCY MEDICINE

## 2020-08-20 PROCEDURE — 84484 ASSAY OF TROPONIN QUANT: CPT | Performed by: EMERGENCY MEDICINE

## 2020-08-20 PROCEDURE — 71275 CT ANGIOGRAPHY CHEST: CPT

## 2020-08-20 PROCEDURE — 85730 THROMBOPLASTIN TIME PARTIAL: CPT | Performed by: EMERGENCY MEDICINE

## 2020-08-20 PROCEDURE — 80307 DRUG TEST PRSMV CHEM ANLYZR: CPT | Performed by: HOSPITALIST

## 2020-08-20 PROCEDURE — 36415 COLL VENOUS BLD VENIPUNCTURE: CPT | Performed by: EMERGENCY MEDICINE

## 2020-08-20 PROCEDURE — 85025 COMPLETE CBC W/AUTO DIFF WBC: CPT | Performed by: EMERGENCY MEDICINE

## 2020-08-20 PROCEDURE — 85610 PROTHROMBIN TIME: CPT | Performed by: EMERGENCY MEDICINE

## 2020-08-20 PROCEDURE — 74177 CT ABD & PELVIS W/CONTRAST: CPT

## 2020-08-20 PROCEDURE — 96375 TX/PRO/DX INJ NEW DRUG ADDON: CPT

## 2020-08-20 PROCEDURE — 96374 THER/PROPH/DIAG INJ IV PUSH: CPT

## 2020-08-20 PROCEDURE — 83880 ASSAY OF NATRIURETIC PEPTIDE: CPT | Performed by: EMERGENCY MEDICINE

## 2020-08-20 PROCEDURE — 99285 EMERGENCY DEPT VISIT HI MDM: CPT

## 2020-08-20 PROCEDURE — 83690 ASSAY OF LIPASE: CPT | Performed by: EMERGENCY MEDICINE

## 2020-08-20 RX ORDER — MAGNESIUM SULFATE 1 G/100ML
1 INJECTION INTRAVENOUS ONCE
Status: COMPLETED | OUTPATIENT
Start: 2020-08-20 | End: 2020-08-21

## 2020-08-20 RX ORDER — SODIUM CHLORIDE AND POTASSIUM CHLORIDE .9; .15 G/100ML; G/100ML
125 SOLUTION INTRAVENOUS CONTINUOUS
Status: DISCONTINUED | OUTPATIENT
Start: 2020-08-20 | End: 2020-08-22

## 2020-08-20 RX ORDER — ONDANSETRON 2 MG/ML
4 INJECTION INTRAMUSCULAR; INTRAVENOUS EVERY 4 HOURS PRN
Status: DISCONTINUED | OUTPATIENT
Start: 2020-08-20 | End: 2020-08-29 | Stop reason: HOSPADM

## 2020-08-20 RX ORDER — HYDRALAZINE HYDROCHLORIDE 20 MG/ML
5 INJECTION INTRAMUSCULAR; INTRAVENOUS EVERY 4 HOURS PRN
Status: DISCONTINUED | OUTPATIENT
Start: 2020-08-20 | End: 2020-08-22

## 2020-08-20 RX ORDER — PANTOPRAZOLE SODIUM 40 MG/1
40 INJECTION, POWDER, FOR SOLUTION INTRAVENOUS
Status: DISCONTINUED | OUTPATIENT
Start: 2020-08-21 | End: 2020-08-26

## 2020-08-20 RX ORDER — NICOTINE 21 MG/24HR
1 PATCH, TRANSDERMAL 24 HOURS TRANSDERMAL DAILY
Status: DISCONTINUED | OUTPATIENT
Start: 2020-08-21 | End: 2020-08-29 | Stop reason: HOSPADM

## 2020-08-20 RX ORDER — SODIUM CHLORIDE 9 MG/ML
3 INJECTION INTRAVENOUS
Status: DISCONTINUED | OUTPATIENT
Start: 2020-08-20 | End: 2020-08-29 | Stop reason: HOSPADM

## 2020-08-20 RX ORDER — MORPHINE SULFATE 4 MG/ML
4 INJECTION, SOLUTION INTRAMUSCULAR; INTRAVENOUS ONCE
Status: COMPLETED | OUTPATIENT
Start: 2020-08-20 | End: 2020-08-20

## 2020-08-20 RX ORDER — ONDANSETRON 2 MG/ML
4 INJECTION INTRAMUSCULAR; INTRAVENOUS ONCE
Status: COMPLETED | OUTPATIENT
Start: 2020-08-20 | End: 2020-08-20

## 2020-08-20 RX ADMIN — ONDANSETRON 4 MG: 2 INJECTION INTRAMUSCULAR; INTRAVENOUS at 17:45

## 2020-08-20 RX ADMIN — SODIUM CHLORIDE 1000 ML: 0.9 INJECTION, SOLUTION INTRAVENOUS at 17:45

## 2020-08-20 RX ADMIN — MORPHINE SULFATE 4 MG: 4 INJECTION INTRAVENOUS at 17:45

## 2020-08-20 RX ADMIN — MORPHINE SULFATE 2 MG: 2 INJECTION, SOLUTION INTRAMUSCULAR; INTRAVENOUS at 23:17

## 2020-08-20 RX ADMIN — MAGNESIUM SULFATE HEPTAHYDRATE 1 G: 1 INJECTION, SOLUTION INTRAVENOUS at 23:02

## 2020-08-20 RX ADMIN — SODIUM CHLORIDE AND POTASSIUM CHLORIDE 125 ML/HR: .9; .15 SOLUTION INTRAVENOUS at 22:51

## 2020-08-20 RX ADMIN — IOHEXOL 100 ML: 350 INJECTION, SOLUTION INTRAVENOUS at 19:36

## 2020-08-20 NOTE — ED PROVIDER NOTES
History  Chief Complaint   Patient presents with    Chest Pain     patient reports chest pressure that began this morning while sitting in his recliner  HPI     Pt presents from home, hx of alcohol abuse, CAD, HTN, COPD, c/o epigastric abd pain with radiation into his chest which began earlier today  Pt has sob and nausea with the pain  Pain is constant, no alleviating/aggravating factors, no prior similar, mild to moderate intensity, and currently present  Pt drinks alcohol almost daily, and he had drank 2 beers earlier today until his pain began  Pt denies ha, fevers, cough, cp, sob, n/v/d/c, dysuria, focal def or syncope  Prior to Admission Medications   Prescriptions Last Dose Informant Patient Reported? Taking?    Multiple Vitamin (TAB-A-BONI PO)  Self Yes Yes   Sig: Take 1 tablet by mouth daily   acetaminophen (TYLENOL) 325 mg tablet  Self No Yes   Sig: Take 2 tablets every 6 hours as needed   albuterol (2 5 mg/3 mL) 0 083 % nebulizer solution  Self No Yes   Sig: Take 1 vial (2 5 mg total) by nebulization every 6 (six) hours as needed for wheezing or shortness of breath   albuterol (PROVENTIL HFA,VENTOLIN HFA) 90 mcg/act inhaler   No Yes   Sig: Inhale 2 puffs every 4 (four) hours as needed for wheezing   amitriptyline (ELAVIL) 25 mg tablet  Self No Yes   Sig: Take 1 tablet (25 mg total) by mouth daily at bedtime as needed for sleep   cholecalciferol (VITAMIN D3) 1,000 units tablet  Self No Yes   Sig: Take 1 tablet (1,000 Units total) by mouth daily   cyanocobalamin (VITAMIN B-12) 100 mcg tablet  Self No Yes   Sig: Take 1 tablet (100 mcg total) by mouth daily   folic acid (FOLVITE) 1 mg tablet  Self No Yes   Sig: Take 1 tablet (1 mg total) by mouth daily   furosemide (LASIX) 20 mg tablet  Self No Yes   Sig: Take 1 tablet (20 mg total) by mouth daily   gabapentin (NEURONTIN) 100 mg capsule  Self No Yes   Sig: Take 1 capsule (100 mg total) by mouth 3 (three) times a day   levETIRAcetam (KEPPRA) 1000 MG tablet   No Yes   Sig: TAKE 1 TABLET BY MOUTH EVERY 12 HOURS   lisinopril (ZESTRIL) 10 mg tablet  Self No Yes   Sig: Take 1 tablet (10 mg total) by mouth daily   magnesium oxide (MAG-OX) 400 mg  Self No Yes   Sig: Take 2 tablets (800 mg total) by mouth 3 (three) times a day   pantoprazole (PROTONIX) 40 mg tablet  Self No Yes   Sig: Take 1 tablet (40 mg total) by mouth 2 (two) times a day before meals   sodium chloride 1 g tablet  Self No Yes   Sig: Take 1 tablet (1 g total) by mouth 3 (three) times a day   thiamine 100 MG tablet  Self No Yes   Sig: Take 1 tablet (100 mg total) by mouth daily   traMADol (ULTRAM) 50 mg tablet  Self No Yes   Sig: Take 1 tablet (50 mg total) by mouth every 6 (six) hours as needed for moderate pain for up to 20 doses      Facility-Administered Medications: None       Past Medical History:   Diagnosis Date    Alcohol abuse     Traore esophagus     Bowel obstruction (HCC)     Bowel perforation (HCC)     Cardiac disease     Continuous chronic alcoholism ([address] ) 10/5/2017    COPD (chronic obstructive pulmonary disease) (HCC)     History of shoulder surgery     Right shoulder    History of transfusion     Hx of cervical spine surgery     Hypertension     Incisional hernia 10/8/2017    MI, old     Mitral regurgitation     Psychiatric disorder     Seizures ([address] )        Past Surgical History:   Procedure Laterality Date    APPENDECTOMY      BACK SURGERY      CHOLECYSTECTOMY      ESOPHAGOGASTRODUODENOSCOPY N/A 11/28/2016    Procedure: ESOPHAGOGASTRODUODENOSCOPY (EGD); Surgeon: Vito Duncan MD;  Location:  GI LAB;   Service:     GALLBLADDER SURGERY      LAPAROTOMY N/A 10/25/2016    Procedure: LAPAROTOMY EXPLORATORY;  Surgeon: Sridevi Gibbons MD;  Location: [facility];  Service:    Gabrielle Thorne MOUTH SURGERY      PERCUTANEOUS PINNING FEMORAL NECK FRACTURE      SHOULDER SURGERY Right     SHOULDER SURGERY      SMALL INTESTINE SURGERY      STOMACH SURGERY      bal surgery Family History   Problem Relation Age of Onset    Breast cancer Mother     Prostate cancer Father     Skin cancer Brother      I have reviewed and agree with the history as documented  E-Cigarette/Vaping    E-Cigarette Use Former User      E-Cigarette/Vaping Substances    Nicotine No     THC No     CBD No     Flavoring No     Other No     Unknown No      Social History     Tobacco Use    Smoking status: Current Every Day Smoker     Packs/day: 2 00     Years: 15 00     Pack years: 30 00     Types: Cigarettes    Smokeless tobacco: Never Used   Substance Use Topics    Alcohol use: Yes     Alcohol/week: 6 0 standard drinks     Types: 6 Cans of beer per week     Frequency: 4 or more times a week     Comment: 6 12oz cans a day    Drug use: No       Review of Systems   Constitutional: Negative for activity change, appetite change and fever  HENT: Negative for congestion, nosebleeds and sore throat  Eyes: Negative for photophobia and discharge  Respiratory: Negative for cough, shortness of breath, wheezing and stridor  Cardiovascular: Negative for chest pain  Gastrointestinal: Positive for abdominal pain  Negative for constipation, diarrhea, nausea and vomiting  Endocrine: Negative for cold intolerance and polydipsia  Genitourinary: Negative for discharge, hematuria and penile pain  Musculoskeletal: Negative for arthralgias, myalgias and neck stiffness  Skin: Negative for color change and rash  Allergic/Immunologic: Negative for immunocompromised state  Neurological: Negative for dizziness, seizures and headaches  Hematological: Negative for adenopathy  Psychiatric/Behavioral: Negative for confusion and self-injury  The patient is not nervous/anxious  Physical Exam  Physical Exam  Vitals signs and nursing note reviewed  Constitutional:       General: He is not in acute distress  Appearance: He is well-developed  He is not diaphoretic     HENT:      Head: Normocephalic and atraumatic  Right Ear: External ear normal       Left Ear: External ear normal       Nose: Nose normal       Mouth/Throat:      Pharynx: No oropharyngeal exudate  Eyes:      General: No scleral icterus  Right eye: No discharge  Left eye: No discharge  Conjunctiva/sclera: Conjunctivae normal       Pupils: Pupils are equal, round, and reactive to light  Neck:      Musculoskeletal: Normal range of motion and neck supple  Thyroid: No thyromegaly  Vascular: No JVD  Trachea: No tracheal deviation  Cardiovascular:      Rate and Rhythm: Normal rate and regular rhythm  Heart sounds: Normal heart sounds  No murmur  No friction rub  No gallop  Pulmonary:      Effort: No respiratory distress  Breath sounds: Normal breath sounds  No stridor  No wheezing or rales  Chest:      Chest wall: No tenderness  Abdominal:      General: Bowel sounds are normal  There is no distension  Palpations: Abdomen is soft  There is no mass  Tenderness: There is abdominal tenderness  There is no guarding or rebound  Comments: Mild TTP in the epigastric region  +vol guarding  No rebound, rigidity  +BS   Musculoskeletal: Normal range of motion  General: No tenderness or deformity  Lymphadenopathy:      Cervical: No cervical adenopathy  Skin:     General: Skin is warm and dry  Coloration: Skin is not pale  Findings: No erythema or rash  Neurological:      Mental Status: He is alert and oriented to person, place, and time  Cranial Nerves: No cranial nerve deficit  Motor: No abnormal muscle tone  Coordination: Coordination normal       Deep Tendon Reflexes: Reflexes are normal and symmetric  Reflexes normal    Psychiatric:         Behavior: Behavior normal          Thought Content:  Thought content normal          Judgment: Judgment normal          Vital Signs  ED Triage Vitals [08/20/20 1727]   Temperature Pulse Respirations Blood Pressure SpO2   97 9 °F (36 6 °C) 80 16 153/91 94 %      Temp Source Heart Rate Source Patient Position - Orthostatic VS BP Location FiO2 (%)   Temporal Monitor Sitting Right arm --      Pain Score       4           Vitals:    08/20/20 1800 08/20/20 1830 08/20/20 1900 08/20/20 2115   BP: 134/77 131/89 137/79 157/79   Pulse: 78 78 79 69   Patient Position - Orthostatic VS: Sitting Sitting           Visual Acuity      ED Medications  Medications   sodium chloride (PF) 0 9 % injection 3 mL (has no administration in time range)   magnesium sulfate IVPB (premix) SOLN 1 g (has no administration in time range)   sodium chloride 0 9 % with KCl 20 mEq/L infusion (premix) (has no administration in time range)   ondansetron (ZOFRAN) injection 4 mg (has no administration in time range)   pantoprazole (PROTONIX) injection 40 mg (has no administration in time range)   morphine injection 2 mg (has no administration in time range)   morphine injection 1 mg (has no administration in time range)   levETIRAcetam (KEPPRA) 1,000 mg in sodium chloride 0 9 % 100 mL IVPB (has no administration in time range)   sodium chloride 0 9 % bolus 1,000 mL (1,000 mL Intravenous New Bag 8/20/20 1745)   ondansetron (ZOFRAN) injection 4 mg (4 mg Intravenous Given 8/20/20 1745)   morphine (PF) 4 mg/mL injection 4 mg (4 mg Intravenous Given 8/20/20 1745)   iohexol (OMNIPAQUE) 350 MG/ML injection (MULTI-DOSE) 100 mL (100 mL Intravenous Given 8/20/20 1936)       Diagnostic Studies  Results Reviewed     Procedure Component Value Units Date/Time    Rapid drug screen, urine [[id number]]     Lab Status:  No result Specimen:  Urine     Ethanol [[id number]]     Lab Status:  No result Specimen:  Blood     NT-BNP PRO [[id number]]  (Normal) Collected:  08/20/20 1736    Lab Status:  Final result Specimen:  Blood from Arm, Left Updated:  08/20/20 1809     NT-proBNP 42 pg/mL     Lipase [[id number]]  (Abnormal) Collected:  08/20/20 1736    Lab Status: Final result Specimen:  Blood from Arm, Left Updated:  08/20/20 1809     Lipase 539 u/L     Magnesium [[id number]]  (Abnormal) Collected:  08/20/20 1736    Lab Status:  Final result Specimen:  Blood from Arm, Left Updated:  08/20/20 1809     Magnesium 1 5 mg/dL     Troponin I [[id number]]  (Normal) Collected:  08/20/20 1736    Lab Status:  Final result Specimen:  Blood from Arm, Left Updated:  08/20/20 1805     Troponin I <0 02 ng/mL     Lactic acid, plasma [[id number]]  (Normal) Collected:  08/20/20 1736    Lab Status:  Final result Specimen:  Blood from Arm, Left Updated:  08/20/20 1805     LACTIC ACID 1 9 mmol/L     Narrative:       Result may be elevated if tourniquet was used during collection      Comprehensive metabolic panel [[id number]]  (Abnormal) Collected:  08/20/20 1736    Lab Status:  Final result Specimen:  Blood from Arm, Left Updated:  08/20/20 1803     Sodium 133 mmol/L      Potassium 3 3 mmol/L      Chloride 94 mmol/L      CO2 28 mmol/L      ANION GAP 11 mmol/L      BUN 2 mg/dL      Creatinine 0 66 mg/dL      Glucose 90 mg/dL      Calcium 8 4 mg/dL       U/L      ALT 46 U/L      Alkaline Phosphatase 116 U/L      Total Protein 7 8 g/dL      Albumin 3 6 g/dL      Total Bilirubin 0 60 mg/dL      eGFR 110 ml/min/1 73sq m     Narrative:       [place] guidelines for Chronic Kidney Disease (CKD):     Stage 1 with normal or high GFR (GFR > 90 mL/min/1 73 square meters)    Stage 2 Mild CKD (GFR = 60-89 mL/min/1 73 square meters)    Stage 3A Moderate CKD (GFR = 45-59 mL/min/1 73 square meters)    Stage 3B Moderate CKD (GFR = 30-44 mL/min/1 73 square meters)    Stage 4 Severe CKD (GFR = 15-29 mL/min/1 73 square meters)    Stage 5 End Stage CKD (GFR <15 mL/min/1 73 square meters)  Note: GFR calculation is accurate only with a steady state creatinine    Protime-INR [[id number]]  (Abnormal) Collected:  08/20/20 1736    Lab Status:  Final result Specimen:  Blood from Arm, Left Updated:  08/20/20 1759     Protime 14 6 seconds      INR 1 17    APTT [[id number]]  (Normal) Collected:  08/20/20 1736    Lab Status:  Final result Specimen:  Blood from Arm, Left Updated:  08/20/20 1759     PTT 33 seconds     CBC and differential [[id number]]  (Abnormal) Collected:  08/20/20 1736    Lab Status:  Final result Specimen:  Blood from Arm, Left Updated:  08/20/20 1744     WBC 4 07 Thousand/uL      RBC 3 75 Million/uL      Hemoglobin 9 4 g/dL      Hematocrit 30 0 %      MCV 80 fL      MCH 25 1 pg      MCHC 31 3 g/dL      RDW 17 2 %      MPV 9 4 fL      Platelets 717 Thousands/uL      nRBC 0 /100 WBCs      Neutrophils Relative 44 %      Immat GRANS % 0 %      Lymphocytes Relative 41 %      Monocytes Relative 13 %      Eosinophils Relative 1 %      Basophils Relative 1 %      Neutrophils Absolute 1 82 Thousands/µL      Immature Grans Absolute 0 01 Thousand/uL      Lymphocytes Absolute 1 65 Thousands/µL      Monocytes Absolute 0 51 Thousand/µL      Eosinophils Absolute 0 05 Thousand/µL      Basophils Absolute 0 03 Thousands/µL              EKG: normal sinus rhythm, no acute ischemia    CTA chest ct abdomen pelvis w contrast   Final Result by Justina Grimes MD (08/20 2027)      1  No acute pulmonary embolism  2   Acute pancreatitis  3   Pancreatic head cysts similar in size compared to the prior MRI  While these may represent pseudocysts, there were suspicious features reported on MRI and endoscopic ultrasound was recommended  4   Bladder wall thickening with bilateral ureteral fullness of uncertain etiology, not present previously  No hydronephrosis  Correlation with urinalysis recommended  5   Diffuse hepatic steatosis      6  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 for immediate notification                 Workstation performed: [id number]                    Procedures  Procedures         ED Course       US AUDIT Most Recent Value   Initial Alcohol Screen: US AUDIT-C    1  How often do you have a drink containing alcohol? 6 Filed at: 08/20/2020 1728   2  How many drinks containing alcohol do you have on a typical day you are drinking? 4 Filed at: 08/20/2020 1728   3a  Male UNDER 65: How often do you have five or more drinks on one occasion? 6 Filed at: 08/20/2020 1728   3b  FEMALE Any Age, or MALE 65+: How often do you have 4 or more drinks on one occassion? 0 Filed at: 08/20/2020 1728   Audit-C Score  (!) 16 Filed at: 08/20/2020 1728   Full Alcohol Screen: US AUDIT   4  How often during the last year have you found that you were not able to stop drinking once you had started? 0 Filed at: 08/20/2020 1728   5  How often during past year have you failed to do what was normally expected of you because of drinking? 0 Filed at: 08/20/2020 1728   6  How often in past year have you needed a first drink in the morning to get yourself going after a heavy drinking session? 0 Filed at: 08/20/2020 1728   7  How often in past year have you had feeling of guilt or remorse after drinking? 0 Filed at: 08/20/2020 1728   8  How often in past year have you been unable to remember what happened night before because you had been drinking? 0 Filed at: 08/20/2020 1728   9  Have you or someone else been injured as a result of your drinking? 0 Filed at: 08/20/2020 1728   10   Has a relative, friend, doctor or other health worker been concerned about your drinking and suggested you cut down?   0 Filed at: 08/20/2020 1728   AUDIT Total Score  16 Filed at: 08/20/2020 1728            HEART Risk Score      Most Recent Value   Heart Score Risk Calculator   History  0 Filed at: 08/20/2020 2117   ECG  0 Filed at: 08/20/2020 2117   Age  1 Filed at: 08/20/2020 2117   Risk Factors  2 Filed at: 08/20/2020 2117   Troponin  0 Filed at: 08/20/2020 2117   HEART Score  3 Filed at: 08/20/2020 2117            FRANKY/DAST-10      Most Recent Value   How many times in the past year have you    Used an illegal drug or used a prescription medication for non-medical reasons? Never Filed at: 08/20/2020 1728                    Wells' Criteria for PE      Most Recent Value   Wells' Criteria for PE   Clinical signs and symptoms of DVT  0 Filed at: 08/20/2020 2118   PE is primary diagnosis or equally likely  3 Filed at: 08/20/2020 2118   HR >100  0 Filed at: 08/20/2020 2118   Immobilization at least 3 days or Surgery in the previous 4 weeks  0 Filed at: 08/20/2020 2118   Previous, objectively diagnosed PE or DVT  0 Filed at: 08/20/2020 2118   Hemoptysis  0 Filed at: 08/20/2020 2118   Malignancy with treatment within 6 months or palliative  0 Filed at: 08/20/2020 2118   Si Cl' Criteria Total  3 Filed at: 08/20/2020 2118                  MDM  Number of Diagnoses or Management Options  Epigastric pain:   Diagnosis management comments: IMP: GERD versus pancreatitis, viral gastro, musc pain  Consider ACS, PE, colitis  Doubt dissection, tamponade, cva, bacteremia, surgical abd process  Plan: cardiac labs, ekg, cxr, ct chest/abd/pelvis, give ivf and iv narcotic pain meds prn   - ct scan shows acute pancreatitis  - ekg no acute   - lipase 539  - Pt with acute pancreatitis but still symptomatic  Will admit to medicine           Amount and/or Complexity of Data Reviewed  Clinical lab tests: ordered and reviewed  Tests in the radiology section of CPT®: ordered and reviewed  Tests in the medicine section of CPT®: reviewed and ordered  Decide to obtain previous medical records or to obtain history from someone other than the patient: yes  Obtain history from someone other than the patient: yes (EMS providers)  Review and summarize past medical records: yes  Discuss the patient with other providers: yes (Hospitalist)  Independent visualization of images, tracings, or specimens: yes    Risk of Complications, Morbidity, and/or Mortality  Presenting problems: high  Diagnostic procedures: high  Management options: high    Patient Progress  Patient progress: improved        Disposition  Final diagnoses:   Epigastric pain     Time reflects when diagnosis was documented in both MDM as applicable and the Disposition within this note     Time User Action Codes Description Comment    8/20/2020  9:15 PM Renny Canales Add [R10 13] Epigastric pain       ED Disposition     ED Disposition Condition Date/Time Comment    Admit Stable u Aug 20, 2020  9:14 PM Case was discussed with Dr Patricia Khan and the patient's admission status was agreed to be Admission Status: observation status to the service of Dr Patricia Khan  Follow-up Information    None         Patient's Medications   Discharge Prescriptions    No medications on file     No discharge procedures on file      PDMP Review     None          ED Provider  Electronically Signed by           Haylee Harley DO  08/26/20 [id number]

## 2020-08-21 LAB
ALBUMIN SERPL BCP-MCNC: 3.4 G/DL (ref 3.5–5)
ALP SERPL-CCNC: 108 U/L (ref 46–116)
ALT SERPL W P-5'-P-CCNC: 34 U/L (ref 12–78)
AMYLASE SERPL-CCNC: 142 IU/L (ref 25–115)
ANION GAP SERPL CALCULATED.3IONS-SCNC: 9 MMOL/L (ref 4–13)
AST SERPL W P-5'-P-CCNC: 82 U/L (ref 5–45)
ATRIAL RATE: 86 BPM
BILIRUB SERPL-MCNC: 0.8 MG/DL (ref 0.2–1)
BUN SERPL-MCNC: 3 MG/DL (ref 5–25)
CALCIUM SERPL-MCNC: 7.8 MG/DL (ref 8.3–10.1)
CHLORIDE SERPL-SCNC: 97 MMOL/L (ref 100–108)
CO2 SERPL-SCNC: 28 MMOL/L (ref 21–32)
CREAT SERPL-MCNC: 0.63 MG/DL (ref 0.6–1.3)
ERYTHROCYTE [DISTWIDTH] IN BLOOD BY AUTOMATED COUNT: 17.3 % (ref 11.6–15.1)
GFR SERPL CREATININE-BSD FRML MDRD: 112 ML/MIN/1.73SQ M
GLUCOSE P FAST SERPL-MCNC: 92 MG/DL (ref 65–99)
GLUCOSE SERPL-MCNC: 92 MG/DL (ref 65–140)
HCT VFR BLD AUTO: 31.6 % (ref 36.5–49.3)
HGB BLD-MCNC: 9.5 G/DL (ref 12–17)
INR PPP: 1.18 (ref 0.84–1.19)
LIPASE SERPL-CCNC: 394 U/L (ref 73–393)
MCH RBC QN AUTO: 24.7 PG (ref 26.8–34.3)
MCHC RBC AUTO-ENTMCNC: 30.1 G/DL (ref 31.4–37.4)
MCV RBC AUTO: 82 FL (ref 82–98)
P AXIS: 72 DEGREES
PLATELET # BLD AUTO: 100 THOUSANDS/UL (ref 149–390)
PMV BLD AUTO: 9.9 FL (ref 8.9–12.7)
POTASSIUM SERPL-SCNC: 3.2 MMOL/L (ref 3.5–5.3)
PR INTERVAL: 154 MS
PROT SERPL-MCNC: 7.3 G/DL (ref 6.4–8.2)
PROTHROMBIN TIME: 14.8 SECONDS (ref 11.6–14.5)
QRS AXIS: -50 DEGREES
QRSD INTERVAL: 94 MS
QT INTERVAL: 396 MS
QTC INTERVAL: 473 MS
RBC # BLD AUTO: 3.85 MILLION/UL (ref 3.88–5.62)
SODIUM SERPL-SCNC: 134 MMOL/L (ref 136–145)
T WAVE AXIS: 54 DEGREES
VENTRICULAR RATE: 86 BPM
WBC # BLD AUTO: 3.17 THOUSAND/UL (ref 4.31–10.16)

## 2020-08-21 PROCEDURE — 85610 PROTHROMBIN TIME: CPT | Performed by: HOSPITALIST

## 2020-08-21 PROCEDURE — C9113 INJ PANTOPRAZOLE SODIUM, VIA: HCPCS | Performed by: HOSPITALIST

## 2020-08-21 PROCEDURE — 99232 SBSQ HOSP IP/OBS MODERATE 35: CPT | Performed by: PHYSICIAN ASSISTANT

## 2020-08-21 PROCEDURE — 80053 COMPREHEN METABOLIC PANEL: CPT | Performed by: HOSPITALIST

## 2020-08-21 PROCEDURE — 85027 COMPLETE CBC AUTOMATED: CPT | Performed by: HOSPITALIST

## 2020-08-21 PROCEDURE — 94664 DEMO&/EVAL PT USE INHALER: CPT

## 2020-08-21 PROCEDURE — 83690 ASSAY OF LIPASE: CPT | Performed by: HOSPITALIST

## 2020-08-21 PROCEDURE — 93010 ELECTROCARDIOGRAM REPORT: CPT | Performed by: INTERNAL MEDICINE

## 2020-08-21 PROCEDURE — 82150 ASSAY OF AMYLASE: CPT | Performed by: HOSPITALIST

## 2020-08-21 RX ORDER — ALBUTEROL SULFATE 90 UG/1
2 AEROSOL, METERED RESPIRATORY (INHALATION) EVERY 4 HOURS PRN
Status: DISCONTINUED | OUTPATIENT
Start: 2020-08-21 | End: 2020-08-29 | Stop reason: HOSPADM

## 2020-08-21 RX ORDER — FOLIC ACID 1 MG/1
1 TABLET ORAL DAILY
Status: DISCONTINUED | OUTPATIENT
Start: 2020-08-22 | End: 2020-08-29 | Stop reason: HOSPADM

## 2020-08-21 RX ORDER — ALBUTEROL SULFATE 2.5 MG/3ML
2.5 SOLUTION RESPIRATORY (INHALATION) EVERY 6 HOURS PRN
Status: DISCONTINUED | OUTPATIENT
Start: 2020-08-21 | End: 2020-08-29 | Stop reason: HOSPADM

## 2020-08-21 RX ORDER — THIAMINE MONONITRATE (VIT B1) 100 MG
100 TABLET ORAL DAILY
Status: DISCONTINUED | OUTPATIENT
Start: 2020-08-22 | End: 2020-08-29 | Stop reason: HOSPADM

## 2020-08-21 RX ADMIN — Medication 1000 MG: at 00:22

## 2020-08-21 RX ADMIN — MORPHINE SULFATE 2 MG: 2 INJECTION, SOLUTION INTRAMUSCULAR; INTRAVENOUS at 11:25

## 2020-08-21 RX ADMIN — MORPHINE SULFATE 2 MG: 2 INJECTION, SOLUTION INTRAMUSCULAR; INTRAVENOUS at 16:43

## 2020-08-21 RX ADMIN — SODIUM CHLORIDE AND POTASSIUM CHLORIDE 125 ML/HR: .9; .15 SOLUTION INTRAVENOUS at 16:40

## 2020-08-21 RX ADMIN — MORPHINE SULFATE 2 MG: 2 INJECTION, SOLUTION INTRAMUSCULAR; INTRAVENOUS at 22:54

## 2020-08-21 RX ADMIN — MORPHINE SULFATE 2 MG: 2 INJECTION, SOLUTION INTRAMUSCULAR; INTRAVENOUS at 06:01

## 2020-08-21 RX ADMIN — SODIUM CHLORIDE AND POTASSIUM CHLORIDE 125 ML/HR: .9; .15 SOLUTION INTRAVENOUS at 06:52

## 2020-08-21 RX ADMIN — Medication 1000 MG: at 21:45

## 2020-08-21 RX ADMIN — Medication 1000 MG: at 08:35

## 2020-08-21 RX ADMIN — PANTOPRAZOLE SODIUM 40 MG: 40 INJECTION, POWDER, FOR SOLUTION INTRAVENOUS at 08:27

## 2020-08-21 NOTE — ASSESSMENT & PLAN NOTE
Patient stated that he only drank 2 beers prior to admission  · Continue CIWA protocol - scores have been low/negative    · Blood alcohol level 104  · Fall/seizure/aspiration precautions

## 2020-08-21 NOTE — H&P
H&P- Jean-Claude Groves 1965, 48 y o  male MRN: [id number]    Unit/Bed#: 426-01 Encounter: [id number]    Primary Care Provider: Chantale Lpoez PA-C   Date and time admitted to hospital: 8/20/2020  5:22 PM          [facility] Department Internal Medicine - History and Physical:       Jean-Claude Groves 48 y o  male MRN: [id number]  Unit/Bed#: 426-01 Encounter: [id number]  Admitting Physician: Cynthia Benitez MD  PCP: Chantale Lopez PA-C  Date of Admission:  08/20/20        Assessment and Plan:     * Alcohol dependence [facility])  Assessment & Plan  Patient stated that he only drank 2 beers this morning  · Will initiate CIWA protocol  · Blood alcohol level pending  · Fall/seizure/aspiration precautions    Pancreatitis, alcoholic, acute  Assessment & Plan  · Will keep NPO overnight  · IV Zofran p r n  for nausea/ vomiting  · IV morphine p r n  for pain  · IV fluid hydration    Electrolyte depletion  Assessment & Plan  · Will replace and repeat labs in a m  Chronic GERD  Assessment & Plan  · IV Protonix    Traore esophagus  Assessment & Plan  · Will start IV Protonix while NPO  · Outpatient follow-up with GI    Hx of seizure disorder  Assessment & Plan  · IV Keppra while NPO  Will eventually switch to p o  Hepatic steatosis  Assessment & Plan  · Encourage cessation of alcohol  · Follow-up outpatient with gastroenterology team    Chronic obstructive pulmonary disease ([address] )  Assessment & Plan  · Continue inhalers    Essential hypertension  Assessment & Plan  · IV hydralazine p r n  While NPO  · Resume lisinopril when no longer NPO            VTE Prophylaxis: Pharmacologic VTE Prophylaxis contraindicated due to Thrombocytopenia  / sequential compression device   Code Status: full      Anticipated Length of Stay:  Patient will be admitted on an Observation basis with an anticipated length of stay of  1- 2 midnights     Justification for Hospital Stay:  Alcohol intoxication/pancreatitis    Total Time for Visit, including Counseling / Coordination of Care: 30 minutes  Greater than 50% of this total time spent on direct patient counseling and coordination of care  Chief Complaint:   Abdominal pain    History of Present Illness:    Nestor Byers is a 48 y o  male who presents to the ED with complaints epigastric abd pain with radiation into his chest which began earlier today  He is nauseous but had no episodes of vomiting  The patient stated that he drank only 2 beers this morning  He denied vomiting/falls/near syncopal symptoms/syncope  In the ED he had a CT scan of the abdomen and pelvis done which had findings consistent with pancreatitis  The patient stated that he never had pancreatitis before  Review of Systems:    Review of Systems   Constitutional: Negative  HENT: Negative  Eyes: Negative  Respiratory: Negative  Cardiovascular: Negative  Gastrointestinal: Positive for abdominal pain and nausea  Genitourinary: Negative  Musculoskeletal: Negative  Skin: Negative  Neurological: Negative  Psychiatric/Behavioral: Negative  Past Medical and Surgical History:     Past Medical History:   Diagnosis Date    Alcohol abuse     Traore esophagus     Bowel obstruction (HCC)     Bowel perforation (HCC)     Cardiac disease     Continuous chronic alcoholism ([facility] ) 10/5/2017    COPD (chronic obstructive pulmonary disease) (HCC)     History of shoulder surgery     Right shoulder    History of transfusion     Hx of cervical spine surgery     Hypertension     Incisional hernia 10/8/2017    MI, old     Mitral regurgitation     Psychiatric disorder     Seizures ([facility] [address] )        Past Surgical History:   Procedure Laterality Date    APPENDECTOMY      BACK SURGERY      CHOLECYSTECTOMY      ESOPHAGOGASTRODUODENOSCOPY N/A 11/28/2016    Procedure: ESOPHAGOGASTRODUODENOSCOPY (EGD); Surgeon: María Elena Argueta MD;  Location: [facility];   Service:     GALLBLADDER SURGERY      LAPAROTOMY N/A 10/25/2016 Procedure: LAPAROTOMY EXPLORATORY;  Surgeon: Coreen Padilla MD;  Location: [facility];  Service:     MOUTH SURGERY      PERCUTANEOUS PINNING FEMORAL NECK FRACTURE      SHOULDER SURGERY Right     SHOULDER SURGERY      SMALL INTESTINE SURGERY      STOMACH SURGERY      bal surgery       Meds/Allergies:    Prior to Admission medications    Medication Sig Start Date End Date Taking?  Authorizing Provider   acetaminophen (TYLENOL) 325 mg tablet Take 2 tablets every 6 hours as needed 8/5/17  Yes Marti Meléndez MD   albuterol (2 5 mg/3 mL) 0 083 % nebulizer solution Take 1 vial (2 5 mg total) by nebulization every 6 (six) hours as needed for wheezing or shortness of breath 7/17/18  Yes Angela Benoit PA-C   albuterol (PROVENTIL HFA,VENTOLIN HFA) 90 mcg/act inhaler Inhale 2 puffs every 4 (four) hours as needed for wheezing 5/1/20  Yes Angela Benoit PA-C   amitriptyline (ELAVIL) 25 mg tablet Take 1 tablet (25 mg total) by mouth daily at bedtime as needed for sleep 6/13/19  Yes Angela Benoit PA-C   cholecalciferol (VITAMIN D3) 1,000 units tablet Take 1 tablet (1,000 Units total) by mouth daily 7/17/18  Yes Angela Benoit PA-C   cyanocobalamin (VITAMIN B-12) 100 mcg tablet Take 1 tablet (100 mcg total) by mouth daily 7/17/18  Yes Angela Benoit PA-C   folic acid (FOLVITE) 1 mg tablet Take 1 tablet (1 mg total) by mouth daily 11/4/19  Yes Angela Benoit PA-C   furosemide (LASIX) 20 mg tablet Take 1 tablet (20 mg total) by mouth daily 9/27/19  Yes Osiris Stoddard DO   gabapentin (NEURONTIN) 100 mg capsule Take 1 capsule (100 mg total) by mouth 3 (three) times a day 11/4/19  Yes Angela Benoit PA-C   levETIRAcetam (KEPPRA) 1000 MG tablet TAKE 1 TABLET BY MOUTH EVERY 12 HOURS 4/18/20  Yes Angela Benoit PA-C   lisinopril (ZESTRIL) 10 mg tablet Take 1 tablet (10 mg total) by mouth daily 9/20/19  Yes Angela Benoit PA-C   magnesium oxide (MAG-OX) 400 mg Take 2 tablets (800 mg total) by mouth 3 (three) times a day 7/30/18  Yes Rhina Juarez PA-C   Multiple Vitamin (TAB-A-BONI PO) Take 1 tablet by mouth daily   Yes Historical Provider, MD   pantoprazole (PROTONIX) 40 mg tablet Take 1 tablet (40 mg total) by mouth 2 (two) times a day before meals 12/12/19  Yes Nelwyn Sandhoff, PA-C   sodium chloride 1 g tablet Take 1 tablet (1 g total) by mouth 3 (three) times a day 7/17/18  Yes Rhina Juarez PA-C   thiamine 100 MG tablet Take 1 tablet (100 mg total) by mouth daily 11/4/19  Yes Rhina Juarez PA-C   traMADol (ULTRAM) 50 mg tablet Take 1 tablet (50 mg total) by mouth every 6 (six) hours as needed for moderate pain for up to 20 doses 11/12/19  Yes Melinda Moss MD     I have reviewed home medications using allscripts  Allergies: Allergies   Allergen Reactions    Cholestatin        Social History:     Marital Status: Single     Social History     Substance and Sexual Activity   Alcohol Use Yes    Alcohol/week: 6 0 standard drinks    Types: 6 Cans of beer per week    Frequency: 4 or more times a week    Comment: 6 12oz cans a day     Social History     Tobacco Use   Smoking Status Current Every Day Smoker    Packs/day: 2 00    Years: 15 00    Pack years: 30 00    Types: Cigarettes   Smokeless Tobacco Never Used     Social History     Substance and Sexual Activity   Drug Use No       Family History:    non-contributory    Physical Exam:     Vitals:   Blood Pressure: 154/98 (08/20/20 2150)  Pulse: 69 (08/20/20 2150)  Temperature: 98 4 °F (36 9 °C) (08/20/20 2150)  Temp Source: Oral (08/20/20 2150)  Respirations: 18 (08/20/20 2150)  Height: 5' 6" (167 6 cm) (08/20/20 2150)  Weight - Scale: 57 7 kg (127 lb 3 3 oz) (08/20/20 2150)  SpO2: 96 % (08/20/20 2150)    Physical Exam  Constitutional:       Comments: Appears intoxicated of alcohol   HENT:      Head: Normocephalic and atraumatic  Eyes:      Extraocular Movements: Extraocular movements intact        Pupils: Pupils are equal, round, and reactive to light  Neck:      Musculoskeletal: Neck supple  Cardiovascular:      Rate and Rhythm: Normal rate and regular rhythm  Pulmonary:      Effort: Pulmonary effort is normal       Breath sounds: Normal breath sounds  Abdominal:      General: Bowel sounds are normal       Palpations: Abdomen is soft  Tenderness: There is abdominal tenderness  Musculoskeletal: Normal range of motion  Skin:     General: Skin is warm and dry  Neurological:      Mental Status: He is alert  Comments: Intoxicated   Psychiatric:         Behavior: Behavior normal            Additional Data:     Lab Results: I have personally reviewed pertinent reports  Results from last 7 days   Lab Units 08/20/20  1736   WBC Thousand/uL 4 07*   HEMOGLOBIN g/dL 9 4*   HEMATOCRIT % 30 0*   PLATELETS Thousands/uL 100*   NEUTROS PCT % 44   LYMPHS PCT % 41   MONOS PCT % 13*   EOS PCT % 1     Results from last 7 days   Lab Units 08/20/20  1736   SODIUM mmol/L 133*   POTASSIUM mmol/L 3 3*   CHLORIDE mmol/L 94*   CO2 mmol/L 28   BUN mg/dL 2*   CREATININE mg/dL 0 66   ANION GAP mmol/L 11   CALCIUM mg/dL 8 4   ALBUMIN g/dL 3 6   TOTAL BILIRUBIN mg/dL 0 60   ALK PHOS U/L 116   ALT U/L 46   AST U/L 105*   GLUCOSE RANDOM mg/dL 90     Results from last 7 days   Lab Units 08/20/20  1736   INR  1 17             Results from last 7 days   Lab Units 08/20/20  1736   LACTIC ACID mmol/L 1 9       Imaging: I have personally reviewed pertinent reports  CTA chest ct abdomen pelvis w contrast   Final Result by Nikole Young MD (08/20 2027)      1  No acute pulmonary embolism  2   Acute pancreatitis  3   Pancreatic head cysts similar in size compared to the prior MRI  While these may represent pseudocysts, there were suspicious features reported on MRI and endoscopic ultrasound was recommended  4   Bladder wall thickening with bilateral ureteral fullness of uncertain etiology, not present previously  No hydronephrosis    Correlation with urinalysis recommended  5   Diffuse hepatic steatosis      6  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

## 2020-08-21 NOTE — PROGRESS NOTES
Progress Note - Carlos Duncan 1965, 48 y o  male MRN: [id number]  Unit/Bed#: 426-01 Encounter: [id number]  Primary Care Provider: Betty Murry PA-C   Date and time admitted to hospital: 8/20/2020  5:22 PM    Pancreatitis, alcoholic, acute  Assessment & Plan  · Will keep NPO overnight  · IV Zofran p r n  for nausea/ vomiting  · IV morphine p r n  for pain  · IV fluid hydration  · Lipase 539 -> 394  · Requires additional overnight stay due to still not tolerating p o  Intake in the setting of acute alcoholic pancreatitis  Requires ongoing lab monitoring, reassessments, and IV fluid  * Alcohol dependence ([facility] )  Assessment & Plan  Patient stated that he only drank 2 beers prior to admission  · Continue CIWA protocol - scores have been low/negative  · Blood alcohol level 104  · Fall/seizure/aspiration precautions    Electrolyte depletion  Assessment & Plan  · Will replace and repeat labs in a m  Epigastric pain  Assessment & Plan  Suspect pancreatitis  Patient with history of multiple abdominal complaints  CTA also showed stable, severe stenosis of celiac axis with post stenotic dilatation  Lactic was negative; low suspicion for bowel ischemia at this time  Chronic GERD  Assessment & Plan  · IV Protonix    Traore esophagus  Assessment & Plan  · Will start IV Protonix while NPO  · Outpatient follow-up with GI    Thrombocytopenia (HCC)  Assessment & Plan  Chronic, stable    Hx of seizure disorder  Assessment & Plan  · IV Keppra while NPO  Will eventually switch to p o  Hepatic steatosis  Assessment & Plan  · Encourage cessation of alcohol  · Follow-up outpatient with gastroenterology team    Chronic obstructive pulmonary disease ([facility] )  Assessment & Plan  · Continue inhalers    Essential hypertension  Assessment & Plan  · IV hydralazine p r n   While NPO  · Resume lisinopril when no longer NPO    VTE Pharmacologic Prophylaxis:   Pharmacologic: Pharmacologic VTE Prophylaxis contraindicated due to Thrombocytopenia  Mechanical VTE Prophylaxis in Place: Yes    Patient Centered Rounds: I have performed bedside rounds with nursing staff today  Discussions with Specialists or Other Care Team Provider:  Nursing, case management    Education and Discussions with Family / Patient:  I offered to call patient's family and he declined this  His sister did call while I was in the room and I was available to answer any questions  Time Spent for Care: 45 minutes  More than 50% of total time spent on counseling and coordination of care as described above  Current Length of Stay: 0 day(s)    Current Patient Status: Inpatient   Certification Statement: The patient will continue to require additional inpatient hospital stay due to IV fluids, ongoing reassessment and lab monitoring  Discharge Plan:  Pending, not medically ready    Code Status: Level 1 - Full Code      Subjective:   Still with pain  Worse in the epigastric/substernal area  He does have pain in all quadrants  No bowel movement so far this admission  Denies nausea at this time  No appetite  No fevers or chills  Objective:     Vitals:   Temp (24hrs), Av 3 °F (36 8 °C), Min:97 9 °F (36 6 °C), Max:98 6 °F (37 °C)    Temp:  [97 9 °F (36 6 °C)-98 6 °F (37 °C)] 98 3 °F (36 8 °C)  HR:  [] 76  Resp:  [15-20] 18  BP: (131-157)/(77-98) 146/94  SpO2:  [92 %-99 %] 93 %  Body mass index is 20 53 kg/m²  Input and Output Summary (last 24 hours): Intake/Output Summary (Last 24 hours) at 2020 0909  Last data filed at 2020 0849  Gross per 24 hour   Intake 980 ml   Output 900 ml   Net 80 ml       Physical Exam:     Physical Exam  Vitals signs and nursing note reviewed  Exam conducted with a chaperone present  Constitutional:       Appearance: Normal appearance  He is not ill-appearing  Comments: The patient is pleasant and fully conversational   He appears slightly uncomfortable  Lying in bed     HENT:      Head: Normocephalic and atraumatic  Neck:      Musculoskeletal: No neck rigidity  Cardiovascular:      Rate and Rhythm: Normal rate and regular rhythm  Pulses: Normal pulses  Heart sounds: Normal heart sounds  No murmur  Pulmonary:      Effort: Pulmonary effort is normal  No respiratory distress  Breath sounds: Normal breath sounds  Abdominal:      General: Bowel sounds are normal  There is no distension  Palpations: Abdomen is soft  There is no mass  Comments: The xiphoid process and left lower ribs tenderness   Skin:     Coloration: Skin is not jaundiced  Neurological:      Mental Status: He is alert  Additional Data:     Labs:    Results from last 7 days   Lab Units 08/21/20  0543 08/20/20  1736   WBC Thousand/uL 3 17* 4 07*   HEMOGLOBIN g/dL 9 5* 9 4*   HEMATOCRIT % 31 6* 30 0*   PLATELETS Thousands/uL 100* 100*   NEUTROS PCT %  --  44   LYMPHS PCT %  --  41   MONOS PCT %  --  13*   EOS PCT %  --  1     Results from last 7 days   Lab Units 08/21/20  0543   SODIUM mmol/L 134*   POTASSIUM mmol/L 3 2*   CHLORIDE mmol/L 97*   CO2 mmol/L 28   BUN mg/dL 3*   CREATININE mg/dL 0 63   ANION GAP mmol/L 9   CALCIUM mg/dL 7 8*   ALBUMIN g/dL 3 4*   TOTAL BILIRUBIN mg/dL 0 80   ALK PHOS U/L 108   ALT U/L 34   AST U/L 82*   GLUCOSE RANDOM mg/dL 92     Results from last 7 days   Lab Units 08/21/20  0543   INR  1 18             Results from last 7 days   Lab Units 08/20/20  1736   LACTIC ACID mmol/L 1 9           * I Have Reviewed All Lab Data Listed Above  * Additional Pertinent Lab Tests Reviewed:  All Labs Within Last 24 Hours Reviewed    Imaging:    Imaging Reports Reviewed Today Include: CTA a/p  Imaging Personally Reviewed by Myself Includes:  none    Recent Cultures (last 7 days):           Last 24 Hours Medication List:   Current Facility-Administered Medications   Medication Dose Route Frequency Provider Last Rate    hydrALAZINE  5 mg Intravenous Q4H PRN MD Solomon Myles levETIRAcetam  1,000 mg Intravenous Q12H [address] Quinn Levels, MD 1,000 mg (08/21/20 1384)    morphine injection  1 mg Intravenous Q4H PRN Quinn Levels, MD      morphine injection  2 mg Intravenous Q4H PRN Quinn Levels, MD      nicotine  1 patch Transdermal Daily Quinn Levels, MD      ondansetron  4 mg Intravenous Q4H PRN Quinn Levels, MD      pantoprazole  40 mg Intravenous Q24H [address] Quinn Levels, MD      sodium chloride (PF)  3 mL Intravenous Q1H PRN Willem Hack, DO      sodium chloride 0 9 % with KCl 20 mEq/L  125 mL/hr Intravenous Continuous Quinn Levels,  mL/hr (08/21/20 1113)        Today, Patient Was Seen By: Barbara Gaspar PA-C    ** Please Note: Dictation voice to text software may have been used in the creation of this document   **

## 2020-08-21 NOTE — SOCIAL WORK
Met with pt to discuss role as  in helping pt to develop discharge plan and to help pt carry out their plan  Pt lives in a house with her brother and SORAYA and niece  Pt has 3 TIFF  Pt has no DME at Home   Pt is independent with ADL's  Pt's SORAYA does the cooking  Pt does not drive , he walks to appointments or his family drives him  Pt has never  had home care or any services in the home  Pt's PCP is Breanne Anders  Pt uses the [address] [place]  Pt was given the discharge check list  List was reviewed with a good understanding  Will continue to follow for any Case management needs

## 2020-08-21 NOTE — ASSESSMENT & PLAN NOTE
Suspect pancreatitis  Patient with history of multiple abdominal complaints  CTA also showed stable, severe stenosis of celiac axis with post stenotic dilatation  Lactic was negative; low suspicion for bowel ischemia at this time

## 2020-08-21 NOTE — ASSESSMENT & PLAN NOTE
· Will keep NPO overnight  · IV Zofran p r n  for nausea/ vomiting  · IV morphine p r n  for pain  · IV fluid hydration

## 2020-08-21 NOTE — PLAN OF CARE
Problem: PAIN - ADULT  Goal: Verbalizes/displays adequate comfort level or baseline comfort level  Description: Interventions:  - Encourage patient to monitor pain and request assistance  - Assess pain using appropriate pain scale  - Administer analgesics based on type and severity of pain and evaluate response  - Implement non-pharmacological measures as appropriate and evaluate response  - Consider cultural and social influences on pain and pain management  - Notify physician/advanced practitioner if interventions unsuccessful or patient reports new pain  Outcome: Progressing     Problem: INFECTION - ADULT  Goal: Absence or prevention of progression during hospitalization  Description: INTERVENTIONS:  - Assess and monitor for signs and symptoms of infection  - Monitor lab/diagnostic results  - Monitor all insertion sites, i e  indwelling lines, tubes, and drains  - Monitor endotracheal if appropriate and nasal secretions for changes in amount and color  - Veteran appropriate cooling/warming therapies per order  - Administer medications as ordered  - Instruct and encourage patient and family to use good hand hygiene technique  - Identify and instruct in appropriate isolation precautions for identified infection/condition  Outcome: Progressing     Problem: SAFETY ADULT  Goal: Patient will remain free of falls  Description: INTERVENTIONS:  - Assess patient frequently for physical needs  -  Identify cognitive and physical deficits and behaviors that affect risk of falls    -  Veteran fall precautions as indicated by assessment   - Educate patient/family on patient safety including physical limitations  - Instruct patient to call for assistance with activity based on assessment  - Modify environment to reduce risk of injury  - Consider OT/PT consult to assist with strengthening/mobility  Outcome: Progressing  Goal: Maintain or return to baseline ADL function  Description: INTERVENTIONS:  -  Assess patient's ability to carry out ADLs; assess patient's baseline for ADL function and identify physical deficits which impact ability to perform ADLs (bathing, care of mouth/teeth, toileting, grooming, dressing, etc )  - Assess/evaluate cause of self-care deficits   - Assess range of motion  - Assess patient's mobility; develop plan if impaired  - Assess patient's need for assistive devices and provide as appropriate  - Encourage maximum independence but intervene and supervise when necessary  - Involve family in performance of ADLs  - Assess for home care needs following discharge   - Consider OT consult to assist with ADL evaluation and planning for discharge  - Provide patient education as appropriate  Outcome: Progressing  Goal: Maintain or return mobility status to optimal level  Description: INTERVENTIONS:  - Assess patient's baseline mobility status (ambulation, transfers, stairs, etc )    - Identify cognitive and physical deficits and behaviors that affect mobility  - Identify mobility aids required to assist with transfers and/or ambulation (gait belt, sit-to-stand, lift, walker, cane, etc )  - [place] fall precautions as indicated by assessment  - Record patient progress and toleration of activity level on Mobility SBAR; progress patient to next Phase/Stage  - Instruct patient to call for assistance with activity based on assessment  - Consider rehabilitation consult to assist with strengthening/weightbearing, etc   Outcome: Progressing     Problem: DISCHARGE PLANNING  Goal: Discharge to home or other facility with appropriate resources  Description: INTERVENTIONS:  - Identify barriers to discharge w/patient and caregiver  - Arrange for needed discharge resources and transportation as appropriate  - Identify discharge learning needs (meds, wound care, etc )  - Arrange for interpretive services to assist at discharge as needed  - Refer to Case Management Department for coordinating discharge planning if the patient needs post-hospital services based on physician/advanced practitioner order or complex needs related to functional status, cognitive ability, or social support system  Outcome: Progressing     Problem: Knowledge Deficit  Goal: Patient/family/caregiver demonstrates understanding of disease process, treatment plan, medications, and discharge instructions  Description: Complete learning assessment and assess knowledge base    Interventions:  - Provide teaching at level of understanding  - Provide teaching via preferred learning methods  Outcome: Progressing     Problem: GASTROINTESTINAL - ADULT  Goal: Minimal or absence of nausea and/or vomiting  Description: INTERVENTIONS:  - Administer IV fluids if ordered to ensure adequate hydration  - Maintain NPO status until nausea and vomiting are resolved  - Nasogastric tube if ordered  - Administer ordered antiemetic medications as needed  - Provide nonpharmacologic comfort measures as appropriate  - Advance diet as tolerated, if ordered  - Consider nutrition services referral to assist patient with adequate nutrition and appropriate food choices  Outcome: Progressing     Problem: METABOLIC, FLUID AND ELECTROLYTES - ADULT  Goal: Electrolytes maintained within normal limits  Description: INTERVENTIONS:  - Monitor labs and assess patient for signs and symptoms of electrolyte imbalances  - Administer electrolyte replacement as ordered  - Monitor response to electrolyte replacements, including repeat lab results as appropriate  - Instruct patient on fluid and nutrition as appropriate  Outcome: Progressing

## 2020-08-21 NOTE — PLAN OF CARE
Problem: PAIN - ADULT  Goal: Verbalizes/displays adequate comfort level or baseline comfort level  Description: Interventions:  - Encourage patient to monitor pain and request assistance  - Assess pain using appropriate pain scale  - Administer analgesics based on type and severity of pain and evaluate response  - Implement non-pharmacological measures as appropriate and evaluate response  - Consider cultural and social influences on pain and pain management  - Notify physician/advanced practitioner if interventions unsuccessful or patient reports new pain  Outcome: Progressing     Problem: INFECTION - ADULT  Goal: Absence or prevention of progression during hospitalization  Description: INTERVENTIONS:  - Assess and monitor for signs and symptoms of infection  - Monitor lab/diagnostic results  - Monitor all insertion sites, i e  indwelling lines, tubes, and drains  - Monitor endotracheal if appropriate and nasal secretions for changes in amount and color  - [place] appropriate cooling/warming therapies per order  - Administer medications as ordered  - Instruct and encourage patient and family to use good hand hygiene technique  - Identify and instruct in appropriate isolation precautions for identified infection/condition  Outcome: Progressing     Problem: SAFETY ADULT  Goal: Patient will remain free of falls  Description: INTERVENTIONS:  - Assess patient frequently for physical needs  -  Identify cognitive and physical deficits and behaviors that affect risk of falls    -  [place] fall precautions as indicated by assessment   - Educate patient/family on patient safety including physical limitations  - Instruct patient to call for assistance with activity based on assessment  - Modify environment to reduce risk of injury  - Consider OT/PT consult to assist with strengthening/mobility  Outcome: Progressing  Goal: Maintain or return to baseline ADL function  Description: INTERVENTIONS:  -  Assess patient's ability to carry out ADLs; assess patient's baseline for ADL function and identify physical deficits which impact ability to perform ADLs (bathing, care of mouth/teeth, toileting, grooming, dressing, etc )  - Assess/evaluate cause of self-care deficits   - Assess range of motion  - Assess patient's mobility; develop plan if impaired  - Assess patient's need for assistive devices and provide as appropriate  - Encourage maximum independence but intervene and supervise when necessary  - Involve family in performance of ADLs  - Assess for home care needs following discharge   - Consider OT consult to assist with ADL evaluation and planning for discharge  - Provide patient education as appropriate  Outcome: Progressing  Goal: Maintain or return mobility status to optimal level  Description: INTERVENTIONS:  - Assess patient's baseline mobility status (ambulation, transfers, stairs, etc )    - Identify cognitive and physical deficits and behaviors that affect mobility  - Identify mobility aids required to assist with transfers and/or ambulation (gait belt, sit-to-stand, lift, walker, cane, etc )  - [place] fall precautions as indicated by assessment  - Record patient progress and toleration of activity level on Mobility SBAR; progress patient to next Phase/Stage  - Instruct patient to call for assistance with activity based on assessment  - Consider rehabilitation consult to assist with strengthening/weightbearing, etc   Outcome: Progressing     Problem: DISCHARGE PLANNING  Goal: Discharge to home or other facility with appropriate resources  Description: INTERVENTIONS:  - Identify barriers to discharge w/patient and caregiver  - Arrange for needed discharge resources and transportation as appropriate  - Identify discharge learning needs (meds, wound care, etc )  - Arrange for interpretive services to assist at discharge as needed  - Refer to Case Management Department for coordinating discharge planning if the patient needs post-hospital services based on physician/advanced practitioner order or complex needs related to functional status, cognitive ability, or social support system  Outcome: Progressing     Problem: Knowledge Deficit  Goal: Patient/family/caregiver demonstrates understanding of disease process, treatment plan, medications, and discharge instructions  Description: Complete learning assessment and assess knowledge base    Interventions:  - Provide teaching at level of understanding  - Provide teaching via preferred learning methods  Outcome: Progressing     Problem: GASTROINTESTINAL - ADULT  Goal: Minimal or absence of nausea and/or vomiting  Description: INTERVENTIONS:  - Administer IV fluids if ordered to ensure adequate hydration  - Maintain NPO status until nausea and vomiting are resolved  - Nasogastric tube if ordered  - Administer ordered antiemetic medications as needed  - Provide nonpharmacologic comfort measures as appropriate  - Advance diet as tolerated, if ordered  - Consider nutrition services referral to assist patient with adequate nutrition and appropriate food choices  Outcome: Progressing     Problem: METABOLIC, FLUID AND ELECTROLYTES - ADULT  Goal: Electrolytes maintained within normal limits  Description: INTERVENTIONS:  - Monitor labs and assess patient for signs and symptoms of electrolyte imbalances  - Administer electrolyte replacement as ordered  - Monitor response to electrolyte replacements, including repeat lab results as appropriate  - Instruct patient on fluid and nutrition as appropriate  Outcome: Progressing

## 2020-08-21 NOTE — ASSESSMENT & PLAN NOTE
· Will keep NPO overnight  · IV Zofran p r n  for nausea/ vomiting  · IV morphine p r n  for pain  · IV fluid hydration  · Lipase 539 -> 394  · Requires additional overnight stay due to still not tolerating p o  Intake in the setting of acute alcoholic pancreatitis  Requires ongoing lab monitoring, reassessments, and IV fluid

## 2020-08-21 NOTE — UTILIZATION REVIEW
Initial Clinical Review    Admission: Date/Time/Statement:   Admission Orders (From admission, onward)     Ordered        08/21/20 0902  Inpatient Admission  Once         08/20/20 2127  Place in Observation  Once                   08/21/20 0902    Inpatient Admission  Once       08/21/20 0902      Observation    8/20  @   2127  Changed to Ip admission  8/21  @  0902  · Requires additional overnight stay due to still not tolerating p o  Intake in the setting of acute alcoholic pancreatitis  Requires ongoing lab monitoring, reassessments, and IV fluid  ·   ED Arrival Information     Expected Arrival Acuity Means of Arrival Escorted By Service Admission Type    8/20/2020 8/20/2020 17:22 Emergent Stretcher [address] [facility] Hospitalist Emergency    Arrival Complaint    chest pain        Chief Complaint   Patient presents with    Chest Pain     patient reports chest pressure that began this morning while sitting in his recliner  Assessment/Plan:    48  Y O male presents to ED  Via  EMS  From home with epigastric abdominal pain radiating into chest, started the am of admission  + nausea   - vomiting   Had  2 beers the am of admission  CT scan  Shows pancreatitis  PMH  Is  Alcohol dependence,  Traore's  Esophagus,  Seizure disorder, COPD, GERD, essential hypertension and hepatic steatosis  Admit  Ip with acute alcoholic pancreatitis,  Electrolyte depletion and alcohol dependence and plan is   CIWA  Scores, monitor labs, fall/seizure/aspiration precautions, NPO, IV  PPI, replace electrolytes, IVF and pain control  8/21  Remains  NPO, not tolerating po intake  Continue  IVF  Continue to monitor labs, lipase now  394  Continue pain control          ED Triage Vitals [08/20/20 1727]   Temperature Pulse Respirations Blood Pressure SpO2   97 9 °F (36 6 °C) 80 16 153/91 94 %      Temp Source Heart Rate Source Patient Position - Orthostatic VS BP Location FiO2 (%)   Temporal Monitor Sitting Right arm -- Pain Score       4          Wt Readings from Last 1 Encounters:   08/20/20 57 7 kg (127 lb 3 3 oz)     Additional Vital Signs:   08/21/20 0835                     None (Room air)       08/21/20 0800      76      146/94                08/21/20 07:53:40   98 3 °F (36 8 °C)   75   18   146/94   111   93 %   None (Room air)   Lying    08/21/20 0700      71                      08/21/20 0600      77      140/94                08/21/20 05:38:43   98 3 °F (36 8 °C)   84   19   140/94   109   92 %          08/21/20 0500      113Abnormal                        08/21/20 0400      75                      08/21/20 0300      70                      08/21/20 0200      69      141/98                08/21/20 01:52:08   98 6 °F (37 °C)   91   16   141/98   112   93 %          08/21/20 0100      85                      08/20/20 2246                     None (Room air)       08/20/20 21:50:06   98 4 °F (36 9 °C)   69   18   154/98   117   96 %   None (Room air)   Lying    08/20/20 2115      69   20   157/79   108   95 %          08/20/20 2111                  96 %   None (Room air)       08/20/20 1900      79   20   137/79   103   99 %          08/20/20 1830      78   15   131/89   104   95 %   None (Room air)   Sitting    08/20/20 1800      78   16   134/77   100   95 %   None (Room air)   Sitting    08/20/20 1727   97 9 °F (36 6 °C)   80   16   153/91      94 %   None (Room air)        CIWA-Ar Total            0       Row Name   08/21/20   0100   08/20/20   2200   08/20/20   21:50:06   08/20/20   2115   08/20/20   1900          Pertinent Labs/Diagnostic Test Results:   CTA  Chest/abd/pelvis  ( 8/20)     No acute pulmonary embolism  2   Acute pancreatitis       3   Pancreatic head cysts similar in size compared to the prior MRI   While these may represent pseudocysts, there were suspicious features reported on MRI and endoscopic ultrasound was recommended  4   Bladder wall thickening with bilateral ureteral fullness of uncertain etiology, not present previously   No hydronephrosis   Correlation with urinalysis recommended       5   Diffuse hepatic steatosis     6   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 with poststenotic dilatation      Results from last 7 days   Lab Units 08/21/20  0543 08/20/20  1736   WBC Thousand/uL 3 17* 4 07*   HEMOGLOBIN g/dL 9 5* 9 4*   HEMATOCRIT % 31 6* 30 0*   PLATELETS Thousands/uL 100* 100*   NEUTROS ABS Thousands/µL  --  1 82*         Results from last 7 days   Lab Units 08/21/20  0543 08/20/20  1736   SODIUM mmol/L 134* 133*   POTASSIUM mmol/L 3 2* 3 3*   CHLORIDE mmol/L 97* 94*   CO2 mmol/L 28 28   ANION GAP mmol/L 9 11   BUN mg/dL 3* 2*   CREATININE mg/dL 0 63 0 66   EGFR ml/min/1 73sq m 112 110   CALCIUM mg/dL 7 8* 8 4   MAGNESIUM mg/dL  --  1 5*     Results from last 7 days   Lab Units 08/21/20  0543 08/20/20  1736   AST U/L 82* 105*   ALT U/L 34 46   ALK PHOS U/L 108 116   TOTAL PROTEIN g/dL 7 3 7 8   ALBUMIN g/dL 3 4* 3 6   TOTAL BILIRUBIN mg/dL 0 80 0 60         Results from last 7 days   Lab Units 08/21/20  0543 08/20/20  1736   GLUCOSE RANDOM mg/dL 92 90           Results from last 7 days   Lab Units 08/20/20  1736   TROPONIN I ng/mL <0 02         Results from last 7 days   Lab Units 08/21/20  0543 08/20/20  1736   PROTIME seconds 14 8* 14 6*   INR  1 18 1 17   PTT seconds  --  33             Results from last 7 days   Lab Units 08/20/20  1736   LACTIC ACID mmol/L 1 9             Results from last 7 days   Lab Units 08/20/20  1736   NT-PRO BNP pg/mL 42             Results from last 7 days   Lab Units 08/21/20  0543 08/20/20  1736   LIPASE u/L 394* 539*   AMYLASE IU/L 142*  --            Results from last 7 days   Lab Units 08/20/20  2223   AMPH/METH  Negative   BARBITURATE UR  Negative   BENZODIAZEPINE UR  Negative   COCAINE UR  Negative   METHADONE URINE  Negative   OPIATE UR  Positive*   PCP UR Negative   THC UR  Negative     Results from last 7 days   Lab Units 08/20/20  2217   ETHANOL LVL mg/dL 104*         ED Treatment:   Medication Administration from 08/20/2020 1715 to 08/20/2020 2147       Date/Time Order Dose Route Action Comments     08/20/2020 1745 sodium chloride 0 9 % bolus 1,000 mL 1,000 mL Intravenous New Bag      08/20/2020 1745 ondansetron (ZOFRAN) injection 4 mg 4 mg Intravenous Given      08/20/2020 1745 morphine (PF) 4 mg/mL injection 4 mg 4 mg Intravenous Given      08/20/2020 1936 iohexol (OMNIPAQUE) 350 MG/ML injection (MULTI-DOSE) 100 mL 100 mL Intravenous Given           Present on Admission:   Epigastric pain   Alcohol dependence (HCC)   Traore esophagus   Chronic GERD   Hepatic steatosis   Chronic obstructive pulmonary disease (HCC)   Essential hypertension   Thrombocytopenia (HCC)      Admitting Diagnosis: Epigastric pain [R10 13]  Chest pain [R07 9]  Age/Sex: 48 y o  male  Admission Orders:  Scheduled Medications:  levETIRAcetam, 1,000 mg, Intravenous, Q12H [address]  nicotine, 1 patch, Transdermal, Daily  pantoprazole, 40 mg, Intravenous, Q24H KIMBERLY      Continuous IV Infusions:  sodium chloride 0 9 % with KCl 20 mEq/L, 125 mL/hr, Intravenous, Continuous      PRN Meds:  hydrALAZINE, 5 mg, Intravenous, Q4H PRN  morphine injection, 1 mg, Intravenous, Q4H PRN  morphine injection, 2 mg, Intravenous, Q4H PRN   X1  8/20 and  X2  8/21 thus far  ondansetron, 4 mg, Intravenous, Q4H PRN  sodium chloride (PF), 3 mL, Intravenous, Q1H PRN        Network Utilization Review Department  [email or web address]  ATTENTION: Please call with any questions or concerns to [phone] and carefully listen to the prompts so that you are directed to the right person   All voicemails are confidential   Slade Paez all requests for admission clinical reviews, approved or denied determinations and any other requests to dedicated fax number below belonging to the campus where the patient is receiving treatment   List of dedicated fax numbers for the Facilities:  [address] DENIALS (Administrative/Medical Necessity) [phone]   [address] (Maternity/NICU/Pediatrics) [phone]   Ancelmo Ryder [phone]   Rupert Ros [phone]   Calebkyra Germains [phone]   Hwang Adi [phone]   [address] [facility] [address] [facility] [phone]   [facility]  [phone]   [address] [facility] [address]  [address] [address] [address] [facility] [phone]

## 2020-08-21 NOTE — UTILIZATION REVIEW
Notification of Inpatient Admission/Inpatient Authorization Request   This is a Notification of Inpatient Admission for [address]  Be advised that this patient was admitted to our facility under Inpatient Status  Contact Ariel Siddiqi at [phone] for additional admission information  [address] [facility] DEPT  DEDICATED -[phone]  Patient Name:   Leonia Mcardle   YOB: 1965       [address]   [address]:   [address]  Tax ID: [id number]  NPI: [id number] Attending Provider/NPI:  Phone:  Address: [address]  [phone]  Same as ANGEL/Era Combs 1106 of Service Code: 24 Place of Service Name:  [address] [place] [address]   Start Date: 8/21/20 4233 Discharge Date & Time: No discharge date for patient encounter  Type of Admission: Inpatient Status Discharge Disposition   (if discharged): Home/Self Care   Patient Diagnoses: Epigastric pain [R10 13]  Chest pain [R07 9]     Orders: Admission Orders (From admission, onward)     Ordered        08/21/20 0902  Inpatient Admission  Once         08/20/20 2127  Place in Observation  Once                    Assigned Utilization Review Contact: Ariel Siddiqi  Utilization   Network Utilization Review Department  Phone: [phone]; Fax [phone]  Email: [email or web address]   ATTENTION PAYERS: Please call the assigned Utilization  directly with any questions or concerns ALL voicemails in the department are confidential  Send all requests for admission clinical reviews, approved or denied determinations and any other requests to dedicated fax number belonging to the campus where the patient is receiving treatment

## 2020-08-21 NOTE — ASSESSMENT & PLAN NOTE
Patient stated that he only drank 2 beers this morning  · Will initiate CIWA protocol  · Blood alcohol level pending  · Fall/seizure/aspiration precautions

## 2020-08-22 PROBLEM — I77.4 CELIAC ARTERY STENOSIS (HCC): Status: ACTIVE | Noted: 2020-08-22

## 2020-08-22 PROBLEM — K86.2 PANCREATIC CYST: Status: ACTIVE | Noted: 2020-08-22

## 2020-08-22 PROBLEM — I77.1 CELIAC ARTERY STENOSIS (HCC): Status: ACTIVE | Noted: 2020-08-22

## 2020-08-22 PROBLEM — D61.818 PANCYTOPENIA (HCC): Status: ACTIVE | Noted: 2020-08-22

## 2020-08-22 PROBLEM — Z72.0 TOBACCO USE: Status: ACTIVE | Noted: 2020-08-22

## 2020-08-22 LAB
ALBUMIN SERPL BCP-MCNC: 3.4 G/DL (ref 3.5–5)
ALP SERPL-CCNC: 115 U/L (ref 46–116)
ALT SERPL W P-5'-P-CCNC: 30 U/L (ref 12–78)
ANION GAP SERPL CALCULATED.3IONS-SCNC: 10 MMOL/L (ref 4–13)
AST SERPL W P-5'-P-CCNC: 62 U/L (ref 5–45)
BACTERIA UR QL AUTO: ABNORMAL /HPF
BILIRUB SERPL-MCNC: 1.1 MG/DL (ref 0.2–1)
BILIRUB UR QL STRIP: NEGATIVE
BUN SERPL-MCNC: 3 MG/DL (ref 5–25)
CALCIUM SERPL-MCNC: 7.7 MG/DL (ref 8.3–10.1)
CHLORIDE SERPL-SCNC: 95 MMOL/L (ref 100–108)
CLARITY UR: CLEAR
CO2 SERPL-SCNC: 26 MMOL/L (ref 21–32)
COLOR UR: YELLOW
CREAT SERPL-MCNC: 0.55 MG/DL (ref 0.6–1.3)
ERYTHROCYTE [DISTWIDTH] IN BLOOD BY AUTOMATED COUNT: 17.1 % (ref 11.6–15.1)
GFR SERPL CREATININE-BSD FRML MDRD: 118 ML/MIN/1.73SQ M
GLUCOSE SERPL-MCNC: 74 MG/DL (ref 65–140)
GLUCOSE UR STRIP-MCNC: NEGATIVE MG/DL
HCT VFR BLD AUTO: 32.8 % (ref 36.5–49.3)
HGB BLD-MCNC: 9.9 G/DL (ref 12–17)
HGB UR QL STRIP.AUTO: NEGATIVE
KETONES UR STRIP-MCNC: ABNORMAL MG/DL
LEUKOCYTE ESTERASE UR QL STRIP: NEGATIVE
LIPASE SERPL-CCNC: 291 U/L (ref 73–393)
MAGNESIUM SERPL-MCNC: 1.4 MG/DL (ref 1.6–2.6)
MCH RBC QN AUTO: 24.8 PG (ref 26.8–34.3)
MCHC RBC AUTO-ENTMCNC: 30.2 G/DL (ref 31.4–37.4)
MCV RBC AUTO: 82 FL (ref 82–98)
NITRITE UR QL STRIP: NEGATIVE
NON-SQ EPI CELLS URNS QL MICRO: ABNORMAL /HPF
PH UR STRIP.AUTO: 7.5 [PH]
PLATELET # BLD AUTO: 94 THOUSANDS/UL (ref 149–390)
PMV BLD AUTO: 10.2 FL (ref 8.9–12.7)
POTASSIUM SERPL-SCNC: 3.8 MMOL/L (ref 3.5–5.3)
PROT SERPL-MCNC: 7.4 G/DL (ref 6.4–8.2)
PROT UR STRIP-MCNC: NEGATIVE MG/DL
RBC # BLD AUTO: 4 MILLION/UL (ref 3.88–5.62)
RBC #/AREA URNS AUTO: ABNORMAL /HPF
SARS-COV-2 RNA RESP QL NAA+PROBE: NEGATIVE
SODIUM SERPL-SCNC: 131 MMOL/L (ref 136–145)
SP GR UR STRIP.AUTO: 1.01 (ref 1–1.03)
UROBILINOGEN UR QL STRIP.AUTO: 1 E.U./DL
WBC # BLD AUTO: 3.79 THOUSAND/UL (ref 4.31–10.16)
WBC #/AREA URNS AUTO: ABNORMAL /HPF

## 2020-08-22 PROCEDURE — 99232 SBSQ HOSP IP/OBS MODERATE 35: CPT | Performed by: INTERNAL MEDICINE

## 2020-08-22 PROCEDURE — C9113 INJ PANTOPRAZOLE SODIUM, VIA: HCPCS | Performed by: HOSPITALIST

## 2020-08-22 PROCEDURE — 83735 ASSAY OF MAGNESIUM: CPT | Performed by: PHYSICIAN ASSISTANT

## 2020-08-22 PROCEDURE — 87635 SARS-COV-2 COVID-19 AMP PRB: CPT | Performed by: INTERNAL MEDICINE

## 2020-08-22 PROCEDURE — 83690 ASSAY OF LIPASE: CPT | Performed by: PHYSICIAN ASSISTANT

## 2020-08-22 PROCEDURE — 80053 COMPREHEN METABOLIC PANEL: CPT | Performed by: PHYSICIAN ASSISTANT

## 2020-08-22 PROCEDURE — 87086 URINE CULTURE/COLONY COUNT: CPT | Performed by: INTERNAL MEDICINE

## 2020-08-22 PROCEDURE — 85027 COMPLETE CBC AUTOMATED: CPT | Performed by: PHYSICIAN ASSISTANT

## 2020-08-22 PROCEDURE — 81001 URINALYSIS AUTO W/SCOPE: CPT | Performed by: INTERNAL MEDICINE

## 2020-08-22 RX ORDER — HYDROMORPHONE HCL/PF 1 MG/ML
1 SYRINGE (ML) INJECTION
Status: DISCONTINUED | OUTPATIENT
Start: 2020-08-22 | End: 2020-08-27

## 2020-08-22 RX ORDER — HYDROMORPHONE HCL/PF 1 MG/ML
0.5 SYRINGE (ML) INJECTION
Status: DISCONTINUED | OUTPATIENT
Start: 2020-08-22 | End: 2020-08-27

## 2020-08-22 RX ORDER — LABETALOL 20 MG/4 ML (5 MG/ML) INTRAVENOUS SYRINGE
10 EVERY 4 HOURS PRN
Status: DISCONTINUED | OUTPATIENT
Start: 2020-08-22 | End: 2020-08-29 | Stop reason: HOSPADM

## 2020-08-22 RX ORDER — LISINOPRIL 10 MG/1
10 TABLET ORAL DAILY
Status: DISCONTINUED | OUTPATIENT
Start: 2020-08-22 | End: 2020-08-29 | Stop reason: HOSPADM

## 2020-08-22 RX ORDER — SODIUM CHLORIDE AND POTASSIUM CHLORIDE .9; .15 G/100ML; G/100ML
50 SOLUTION INTRAVENOUS CONTINUOUS
Status: DISCONTINUED | OUTPATIENT
Start: 2020-08-22 | End: 2020-08-29 | Stop reason: HOSPADM

## 2020-08-22 RX ORDER — MAGNESIUM SULFATE HEPTAHYDRATE 40 MG/ML
2 INJECTION, SOLUTION INTRAVENOUS ONCE
Status: COMPLETED | OUTPATIENT
Start: 2020-08-22 | End: 2020-08-22

## 2020-08-22 RX ADMIN — MAGNESIUM SULFATE IN WATER 2 G: 40 INJECTION, SOLUTION INTRAVENOUS at 09:28

## 2020-08-22 RX ADMIN — PANTOPRAZOLE SODIUM 40 MG: 40 INJECTION, POWDER, FOR SOLUTION INTRAVENOUS at 08:37

## 2020-08-22 RX ADMIN — Medication 1000 MG: at 08:40

## 2020-08-22 RX ADMIN — Medication 1000 MG: at 21:58

## 2020-08-22 RX ADMIN — HYDROMORPHONE HYDROCHLORIDE 1 MG: 1 INJECTION, SOLUTION INTRAMUSCULAR; INTRAVENOUS; SUBCUTANEOUS at 18:37

## 2020-08-22 RX ADMIN — SODIUM CHLORIDE AND POTASSIUM CHLORIDE 125 ML/HR: .9; .15 SOLUTION INTRAVENOUS at 00:29

## 2020-08-22 RX ADMIN — HYDROMORPHONE HYDROCHLORIDE 1 MG: 1 INJECTION, SOLUTION INTRAMUSCULAR; INTRAVENOUS; SUBCUTANEOUS at 23:11

## 2020-08-22 RX ADMIN — SODIUM CHLORIDE AND POTASSIUM CHLORIDE 125 ML/HR: .9; .15 SOLUTION INTRAVENOUS at 08:35

## 2020-08-22 RX ADMIN — THIAMINE HCL TAB 100 MG 100 MG: 100 TAB at 08:37

## 2020-08-22 RX ADMIN — POTASSIUM CHLORIDE AND SODIUM CHLORIDE 100 ML/HR: 900; 150 INJECTION, SOLUTION INTRAVENOUS at 18:24

## 2020-08-22 RX ADMIN — HYDROMORPHONE HYDROCHLORIDE 1 MG: 1 INJECTION, SOLUTION INTRAMUSCULAR; INTRAVENOUS; SUBCUTANEOUS at 11:57

## 2020-08-22 RX ADMIN — MORPHINE SULFATE 2 MG: 2 INJECTION, SOLUTION INTRAMUSCULAR; INTRAVENOUS at 05:07

## 2020-08-22 RX ADMIN — MULTIPLE VITAMINS W/ MINERALS TAB 1 TABLET: TAB at 08:37

## 2020-08-22 RX ADMIN — Medication 100 MCG: at 08:36

## 2020-08-22 RX ADMIN — LISINOPRIL 10 MG: 10 TABLET ORAL at 17:34

## 2020-08-22 RX ADMIN — FOLIC ACID 1 MG: 1 TABLET ORAL at 08:36

## 2020-08-22 NOTE — ASSESSMENT & PLAN NOTE
· Likely secondary to alcohol abuse  · Monitor for any signs of bleeding  · Follow the platelet count

## 2020-08-22 NOTE — PROGRESS NOTES
Progress Note - Latasha Long 1965, 48 y o  male MRN: [id number]    Unit/Bed#: 426-01 Encounter: [id number]    Primary Care Provider: Saadia Lane PA-C   Date and time admitted to hospital: 8/20/2020  5:22 PM        * Pancreatitis, alcoholic, acute  Assessment & Plan  · Advance to a clear liquid diet  · Continue NSS IV fluids with 20 meQ KCl at 100 ml/hr  · Utilize PRN IV dilaudid and PRN IV zofran  · Serial abdominal examinations  · Follow the lipase level    Hypomagnesemia  Assessment & Plan  · Give magnesium sulfate 2 grams IV x 1 dose  · Follow the magnesium level    Alcohol dependence (HCC)  Assessment & Plan  · Continue the CIWA protocol  · Continue thiamine 100 mg PO Qdaily, folic acid 1 mg PO Qdaily, and a daily PO multivitamin    Tobacco use  Assessment & Plan  · Nicotine patch  · Smoking cessation counseling    Pancytopenia ([facility] [address] )  Assessment & Plan  · Likely secondary to alcohol abuse  · Follow the CBC  · Outpatient Hematology/Oncology evaluation    Pancreatic cyst  Assessment & Plan  · Check a CA 19-9 level  · Outpatient follow-up with Gastroenterology    Celiac artery stenosis [facility])  Assessment & Plan  · Outpatient Vascular Surgery evaluation    Traore esophagus  Assessment & Plan  · Continue PPI treatment  · Outpatient surveillance imaging with Gastroenterology    Thrombocytopenia ([facility] [address] )  Assessment & Plan  · Likely secondary to alcohol abuse  · Monitor for any signs of bleeding  · Follow the platelet count    Hx of seizure disorder  Assessment & Plan  · Continue IV keppra for now until tolerating PO    Hypokalemia  Assessment & Plan  · Resolved with potassium chloride supplementation treatment    Bladder wall thickening  Assessment & Plan  · Observed on CT scan  · Check a urinalysis and urine culture    Vitamin D deficiency  Assessment & Plan  · Check a vitamin D 25-OH level    Hepatic steatosis  Assessment & Plan  · Likely secondary to alcohol abuse  · Alcohol cessation counseling was given to the patient  · Lifestyle modifications are recommended including weight loss, improving his diet, and increasing his amount of activity  · Outpatient follow-up with Gastroenterology    Essential hypertension  Assessment & Plan  · Continue to hold PO lisinopril for now  · Utilize PRN IV labetalol  · Follow the blood pressure trend      VTE Pharmacologic Prophylaxis:   Pharmacologic: Pharmacologic VTE Prophylaxis contraindicated due to thrombocytopenia  Mechanical VTE Prophylaxis in Place: Yes    Patient Centered Rounds: I have performed bedside rounds with nursing staff today  Time Spent for Care: 30 minutes  More than 50% of total time spent on counseling and coordination of care as described above  Current Length of Stay: 1 day(s)    Current Patient Status: Inpatient   Certification Statement: The patient will continue to require additional inpatient hospital stay due to the need for continuous IV fluids, with the patient requiring IV pain medications to achieve adequate pain control, and while monitoring the patient as his diet is advanced  Code Status: Level 1 - Full Code      Subjective: The patient was seen and examined  The patient continues to complain of epigastric abdominal pain as well as left upper quadrant abdominal pain  Objective:     Vitals:   Temp (24hrs), Av 3 °F (36 8 °C), Min:98 °F (36 7 °C), Max:98 4 °F (36 9 °C)    Temp:  [98 °F (36 7 °C)-98 4 °F (36 9 °C)] 98 °F (36 7 °C)  HR:  [70-78] 75  Resp:  [18-20] 18  BP: (140-146)/(86-94) 143/86  SpO2:  [96 %-98 %] 98 %  Body mass index is 20 53 kg/m²  Input and Output Summary (last 24 hours):        Intake/Output Summary (Last 24 hours) at 2020 1429  Last data filed at 2020 1300  Gross per 24 hour   Intake 3577 5 ml   Output 1350 ml   Net 2227 5 ml       Physical Exam:     Physical Exam  General:  NAD, follows commands  HEENT:  NC/AT, mucous membranes dry  Neck:  Supple, No JVP elevation  CV:  + S1, + S2, RRR  Pulm:  Lung fields are CTA bilaterally  Abd:  Soft, Epigastric abdominal pain and left upper quadrant abdominal pain with palpation, Mild distension  Ext:  No clubbing/cyanosis/edema  Skin:  No rashes  Neuro:  Awake, alert, oriented  Psych:  Normal mood and affect      Additional Data:    Labs:    Results from last 7 days   Lab Units 08/22/20  0424  08/20/20  1736   WBC Thousand/uL 3 79*   < > 4 07*   HEMOGLOBIN g/dL 9 9*   < > 9 4*   HEMATOCRIT % 32 8*   < > 30 0*   PLATELETS Thousands/uL 94*   < > 100*   NEUTROS PCT %  --   --  44   LYMPHS PCT %  --   --  41   MONOS PCT %  --   --  13*   EOS PCT %  --   --  1    < > = values in this interval not displayed  Results from last 7 days   Lab Units 08/22/20  0424   SODIUM mmol/L 131*   POTASSIUM mmol/L 3 8   CHLORIDE mmol/L 95*   CO2 mmol/L 26   BUN mg/dL 3*   CREATININE mg/dL 0 55*   ANION GAP mmol/L 10   CALCIUM mg/dL 7 7*   ALBUMIN g/dL 3 4*   TOTAL BILIRUBIN mg/dL 1 10*   ALK PHOS U/L 115   ALT U/L 30   AST U/L 62*   GLUCOSE RANDOM mg/dL 74     Results from last 7 days   Lab Units 08/21/20  0543   INR  1 18             Results from last 7 days   Lab Units 08/20/20  1736   LACTIC ACID mmol/L 1 9           * I Have Reviewed All Lab Data Listed Above  * Additional Pertinent Lab Tests Reviewed:  Yamilka 67 Admission Reviewed      Recent Cultures (last 7 days):           Last 24 Hours Medication List:   Current Facility-Administered Medications   Medication Dose Route Frequency Provider Last Rate    albuterol  2 puff Inhalation Q4H PRN Tawny Stephen MD      albuterol  2 5 mg Nebulization Q6H PRN Tawny Stephen MD      cyanocobalamin  100 mcg Oral Daily Tawny Stephen MD      folic acid  1 mg Oral Daily Tawny Stephen MD      HYDROmorphone  0 5 mg Intravenous Q3H PRN Jaelyn Redman DO      Or    HYDROmorphone  1 mg Intravenous Q3H PRN Jaelyn Redman DO      Labetalol HCl  10 mg Intravenous Q4H PRN Jaelyn Redman DO  levETIRAcetam  1,000 mg Intravenous Q12H [facility] Chandni Kincaid MD 1,000 mg (08/22/20 0840)    multivitamin-minerals  1 tablet Oral Daily Chandni Kincaid MD      nicotine  1 patch Transdermal Daily Chandni Kincaid MD      ondansetron  4 mg Intravenous Q4H PRN Chandni Kincaid MD      pantoprazole  40 mg Intravenous Q24H [facility] Chandni Kincaid MD      sodium chloride (PF)  3 mL Intravenous Q1H PRN Lorne Perez DO      sodium chloride 0 9 % with KCl 20 mEq/L  100 mL/hr Intravenous Continuous Francheska Stanley  mL/hr (08/22/20 1413)    thiamine  100 mg Oral Daily Chandni Kincaid MD          Today, Patient Was Seen By: Francheska Stanley DO    ** Please Note: Dictation voice to text software may have been used in the creation of this document   **

## 2020-08-22 NOTE — ASSESSMENT & PLAN NOTE
· Check a CA 19-9 level  · Needs an endoscopic ultrasound completed at some point to rule-out malignancy  · Outpatient follow-up with Gastroenterology

## 2020-08-22 NOTE — ASSESSMENT & PLAN NOTE
· Continue to hold PO lisinopril for now  · Utilize PRN IV labetalol  · Follow the blood pressure trend

## 2020-08-22 NOTE — ASSESSMENT & PLAN NOTE
· Advance to a clear liquid diet  · Continue NSS IV fluids with 20 meQ KCl at 100 ml/hr  · Utilize PRN IV dilaudid and PRN IV zofran  · Serial abdominal examinations  · Follow the lipase level

## 2020-08-22 NOTE — ASSESSMENT & PLAN NOTE
· Continue the CIWA protocol  · Continue thiamine 100 mg PO Qdaily, folic acid 1 mg PO Qdaily, and a daily PO multivitamin

## 2020-08-22 NOTE — RESPIRATORY THERAPY NOTE
RT Protocol Note  Keenan Grimaldo 48 y o  male MRN: [id number]  Unit/Bed#: 874-06 Encounter: [id number]    Assessment    Principal Problem:    Alcohol dependence ([place] [id number] )  Active Problems:    Essential hypertension    Chronic obstructive pulmonary disease (HCC)    Hepatic steatosis    Hx of seizure disorder    Thrombocytopenia (HCC)    Traore esophagus    Chronic GERD    Epigastric pain    Pancreatitis, alcoholic, acute    Electrolyte depletion      Home Pulmonary Medications:  Albuterol MDI Q6 PRN  Albuterol Neb Q4 PRN       Past Medical History:   Diagnosis Date    Alcohol abuse     Traore esophagus     Bowel obstruction (HCC)     Bowel perforation (HCC)     Cardiac disease     Continuous chronic alcoholism ([place] [id number] ) 10/5/2017    COPD (chronic obstructive pulmonary disease) (HCC)     History of shoulder surgery     Right shoulder    History of transfusion     Hx of cervical spine surgery     Hypertension     Incisional hernia 10/8/2017    MI, old     Mitral regurgitation     Psychiatric disorder     Seizures ([place] [id number] )      Social History     Socioeconomic History    Marital status: Single     Spouse name: None    Number of children: None    Years of education: None    Highest education level: None   Occupational History    None   Social Needs    Financial resource strain: None    Food insecurity     Worry: None     Inability: None    Transportation needs     Medical: None     Non-medical: None   Tobacco Use    Smoking status: Current Every Day Smoker     Packs/day: 2 00     Years: 15 00     Pack years: 30 00     Types: Cigarettes    Smokeless tobacco: Never Used   Substance and Sexual Activity    Alcohol use:  Yes     Alcohol/week: 6 0 standard drinks     Types: 6 Cans of beer per week     Frequency: 4 or more times a week     Drinks per session: 5 or 6     Binge frequency: Monthly     Comment: 6 12oz cans a day    Drug use: No    Sexual activity: Yes   Lifestyle    Physical activity     Days per week: None     Minutes per session: None    Stress: None   Relationships    Social connections     Talks on phone: None     Gets together: None     Attends Scientologist service: None     Active member of club or organization: None     Attends meetings of clubs or organizations: None     Relationship status: None    Intimate partner violence     Fear of current or ex partner: None     Emotionally abused: None     Physically abused: None     Forced sexual activity: None   Other Topics Concern    None   Social History Narrative    None       Subjective         Objective    Physical Exam:   Assessment Type: Assess only  General Appearance: Alert, Awake  Respiratory Pattern: Dyspnea at rest  Chest Assessment: Chest expansion symmetrical  Bilateral Breath Sounds: Diminished  Cough: Non-productive    Vitals:  Blood pressure 143/92, pulse 70, temperature 98 4 °F (36 9 °C), resp  rate 18, height 5' 6" (1 676 m), weight 57 7 kg (127 lb 3 3 oz), SpO2 96 %  Imaging and other studies: I have personally reviewed pertinent reports  Plan    Respiratory Plan: Home Bronchodilator Patient pathway      Will keep medications as ordered by allison

## 2020-08-22 NOTE — PLAN OF CARE
Problem: PAIN - ADULT  Goal: Verbalizes/displays adequate comfort level or baseline comfort level  Description: Interventions:  - Encourage patient to monitor pain and request assistance  - Assess pain using appropriate pain scale  - Administer analgesics based on type and severity of pain and evaluate response  - Implement non-pharmacological measures as appropriate and evaluate response  - Consider cultural and social influences on pain and pain management  - Notify physician/advanced practitioner if interventions unsuccessful or patient reports new pain  Outcome: Progressing     Problem: INFECTION - ADULT  Goal: Absence or prevention of progression during hospitalization  Description: INTERVENTIONS:  - Assess and monitor for signs and symptoms of infection  - Monitor lab/diagnostic results  - Monitor all insertion sites, i e  indwelling lines, tubes, and drains  - Monitor endotracheal if appropriate and nasal secretions for changes in amount and color  - [place] appropriate cooling/warming therapies per order  - Administer medications as ordered  - Instruct and encourage patient and family to use good hand hygiene technique  - Identify and instruct in appropriate isolation precautions for identified infection/condition  Outcome: Progressing     Problem: SAFETY ADULT  Goal: Patient will remain free of falls  Description: INTERVENTIONS:  - Assess patient frequently for physical needs  -  Identify cognitive and physical deficits and behaviors that affect risk of falls    -  [place] fall precautions as indicated by assessment   - Educate patient/family on patient safety including physical limitations  - Instruct patient to call for assistance with activity based on assessment  - Modify environment to reduce risk of injury  - Consider OT/PT consult to assist with strengthening/mobility  Outcome: Progressing  Goal: Maintain or return to baseline ADL function  Description: INTERVENTIONS:  -  Assess patient's ability to carry out ADLs; assess patient's baseline for ADL function and identify physical deficits which impact ability to perform ADLs (bathing, care of mouth/teeth, toileting, grooming, dressing, etc )  - Assess/evaluate cause of self-care deficits   - Assess range of motion  - Assess patient's mobility; develop plan if impaired  - Assess patient's need for assistive devices and provide as appropriate  - Encourage maximum independence but intervene and supervise when necessary  - Involve family in performance of ADLs  - Assess for home care needs following discharge   - Consider OT consult to assist with ADL evaluation and planning for discharge  - Provide patient education as appropriate  Outcome: Progressing  Goal: Maintain or return mobility status to optimal level  Description: INTERVENTIONS:  - Assess patient's baseline mobility status (ambulation, transfers, stairs, etc )    - Identify cognitive and physical deficits and behaviors that affect mobility  - Identify mobility aids required to assist with transfers and/or ambulation (gait belt, sit-to-stand, lift, walker, cane, etc )  - [place] fall precautions as indicated by assessment  - Record patient progress and toleration of activity level on Mobility SBAR; progress patient to next Phase/Stage  - Instruct patient to call for assistance with activity based on assessment  - Consider rehabilitation consult to assist with strengthening/weightbearing, etc   Outcome: Progressing     Problem: DISCHARGE PLANNING  Goal: Discharge to home or other facility with appropriate resources  Description: INTERVENTIONS:  - Identify barriers to discharge w/patient and caregiver  - Arrange for needed discharge resources and transportation as appropriate  - Identify discharge learning needs (meds, wound care, etc )  - Arrange for interpretive services to assist at discharge as needed  - Refer to Case Management Department for coordinating discharge planning if the patient needs post-hospital services based on physician/advanced practitioner order or complex needs related to functional status, cognitive ability, or social support system  Outcome: Progressing     Problem: Knowledge Deficit  Goal: Patient/family/caregiver demonstrates understanding of disease process, treatment plan, medications, and discharge instructions  Description: Complete learning assessment and assess knowledge base    Interventions:  - Provide teaching at level of understanding  - Provide teaching via preferred learning methods  Outcome: Progressing     Problem: GASTROINTESTINAL - ADULT  Goal: Minimal or absence of nausea and/or vomiting  Description: INTERVENTIONS:  - Administer IV fluids if ordered to ensure adequate hydration  - Maintain NPO status until nausea and vomiting are resolved  - Nasogastric tube if ordered  - Administer ordered antiemetic medications as needed  - Provide nonpharmacologic comfort measures as appropriate  - Advance diet as tolerated, if ordered  - Consider nutrition services referral to assist patient with adequate nutrition and appropriate food choices  Outcome: Progressing     Problem: METABOLIC, FLUID AND ELECTROLYTES - ADULT  Goal: Electrolytes maintained within normal limits  Description: INTERVENTIONS:  - Monitor labs and assess patient for signs and symptoms of electrolyte imbalances  - Administer electrolyte replacement as ordered  - Monitor response to electrolyte replacements, including repeat lab results as appropriate  - Instruct patient on fluid and nutrition as appropriate  Outcome: Progressing

## 2020-08-22 NOTE — ASSESSMENT & PLAN NOTE
· Likely secondary to alcohol abuse  · Alcohol cessation counseling was given to the patient  · Lifestyle modifications are recommended including weight loss, improving his diet, and increasing his amount of activity    · Outpatient follow-up with Gastroenterology

## 2020-08-23 PROBLEM — K86.89 PANCREATIC MASS: Status: ACTIVE | Noted: 2020-08-22

## 2020-08-23 PROBLEM — R74.01 TRANSAMINITIS: Status: ACTIVE | Noted: 2020-08-23

## 2020-08-23 PROBLEM — K59.00 CONSTIPATION: Status: ACTIVE | Noted: 2020-08-23

## 2020-08-23 LAB
25(OH)D3 SERPL-MCNC: 30.9 NG/ML (ref 30–100)
ALBUMIN SERPL BCP-MCNC: 3.1 G/DL (ref 3.5–5)
ALP SERPL-CCNC: 112 U/L (ref 46–116)
ALT SERPL W P-5'-P-CCNC: 32 U/L (ref 12–78)
ANION GAP SERPL CALCULATED.3IONS-SCNC: 2 MMOL/L (ref 4–13)
AST SERPL W P-5'-P-CCNC: 55 U/L (ref 5–45)
BACTERIA UR CULT: NORMAL
BASOPHILS # BLD AUTO: 0.02 THOUSANDS/ΜL (ref 0–0.1)
BASOPHILS NFR BLD AUTO: 1 % (ref 0–1)
BILIRUB SERPL-MCNC: 0.8 MG/DL (ref 0.2–1)
BUN SERPL-MCNC: 2 MG/DL (ref 5–25)
CALCIUM SERPL-MCNC: 7.7 MG/DL (ref 8.3–10.1)
CHLORIDE SERPL-SCNC: 97 MMOL/L (ref 100–108)
CK SERPL-CCNC: 82 U/L (ref 39–308)
CO2 SERPL-SCNC: 32 MMOL/L (ref 21–32)
CREAT SERPL-MCNC: 0.62 MG/DL (ref 0.6–1.3)
EOSINOPHIL # BLD AUTO: 0.09 THOUSAND/ΜL (ref 0–0.61)
EOSINOPHIL NFR BLD AUTO: 2 % (ref 0–6)
ERYTHROCYTE [DISTWIDTH] IN BLOOD BY AUTOMATED COUNT: 17.2 % (ref 11.6–15.1)
FERRITIN SERPL-MCNC: 33 NG/ML (ref 8–388)
FOLATE SERPL-MCNC: 9.2 NG/ML (ref 3.1–17.5)
GFR SERPL CREATININE-BSD FRML MDRD: 112 ML/MIN/1.73SQ M
GLUCOSE SERPL-MCNC: 126 MG/DL (ref 65–140)
HCT VFR BLD AUTO: 31 % (ref 36.5–49.3)
HGB BLD-MCNC: 9.2 G/DL (ref 12–17)
IMM GRANULOCYTES # BLD AUTO: 0.01 THOUSAND/UL (ref 0–0.2)
IMM GRANULOCYTES NFR BLD AUTO: 0 % (ref 0–2)
IRON SATN MFR SERPL: 5 %
IRON SERPL-MCNC: 16 UG/DL (ref 65–175)
LACTATE SERPL-SCNC: 1.3 MMOL/L (ref 0.5–2)
LIPASE SERPL-CCNC: 356 U/L (ref 73–393)
LYMPHOCYTES # BLD AUTO: 0.83 THOUSANDS/ΜL (ref 0.6–4.47)
LYMPHOCYTES NFR BLD AUTO: 22 % (ref 14–44)
MAGNESIUM SERPL-MCNC: 1.5 MG/DL (ref 1.6–2.6)
MCH RBC QN AUTO: 24.7 PG (ref 26.8–34.3)
MCHC RBC AUTO-ENTMCNC: 29.7 G/DL (ref 31.4–37.4)
MCV RBC AUTO: 83 FL (ref 82–98)
MONOCYTES # BLD AUTO: 0.67 THOUSAND/ΜL (ref 0.17–1.22)
MONOCYTES NFR BLD AUTO: 17 % (ref 4–12)
NEUTROPHILS # BLD AUTO: 2.24 THOUSANDS/ΜL (ref 1.85–7.62)
NEUTS SEG NFR BLD AUTO: 58 % (ref 43–75)
NRBC BLD AUTO-RTO: 0 /100 WBCS
PHOSPHATE SERPL-MCNC: 2.1 MG/DL (ref 2.7–4.5)
PLATELET # BLD AUTO: 90 THOUSANDS/UL (ref 149–390)
PMV BLD AUTO: 9.7 FL (ref 8.9–12.7)
POTASSIUM SERPL-SCNC: 3.7 MMOL/L (ref 3.5–5.3)
PROCALCITONIN SERPL-MCNC: <0.05 NG/ML
PROT SERPL-MCNC: 6.9 G/DL (ref 6.4–8.2)
RBC # BLD AUTO: 3.72 MILLION/UL (ref 3.88–5.62)
SODIUM SERPL-SCNC: 131 MMOL/L (ref 136–145)
TIBC SERPL-MCNC: 353 UG/DL (ref 250–450)
VIT B12 SERPL-MCNC: 614 PG/ML (ref 100–900)
WBC # BLD AUTO: 3.86 THOUSAND/UL (ref 4.31–10.16)

## 2020-08-23 PROCEDURE — 83690 ASSAY OF LIPASE: CPT | Performed by: INTERNAL MEDICINE

## 2020-08-23 PROCEDURE — 83735 ASSAY OF MAGNESIUM: CPT | Performed by: INTERNAL MEDICINE

## 2020-08-23 PROCEDURE — C9113 INJ PANTOPRAZOLE SODIUM, VIA: HCPCS | Performed by: HOSPITALIST

## 2020-08-23 PROCEDURE — 83540 ASSAY OF IRON: CPT | Performed by: INTERNAL MEDICINE

## 2020-08-23 PROCEDURE — 83550 IRON BINDING TEST: CPT | Performed by: INTERNAL MEDICINE

## 2020-08-23 PROCEDURE — 83605 ASSAY OF LACTIC ACID: CPT | Performed by: INTERNAL MEDICINE

## 2020-08-23 PROCEDURE — 82607 VITAMIN B-12: CPT | Performed by: INTERNAL MEDICINE

## 2020-08-23 PROCEDURE — 99232 SBSQ HOSP IP/OBS MODERATE 35: CPT | Performed by: INTERNAL MEDICINE

## 2020-08-23 PROCEDURE — 82306 VITAMIN D 25 HYDROXY: CPT | Performed by: INTERNAL MEDICINE

## 2020-08-23 PROCEDURE — 82746 ASSAY OF FOLIC ACID SERUM: CPT | Performed by: INTERNAL MEDICINE

## 2020-08-23 PROCEDURE — 80053 COMPREHEN METABOLIC PANEL: CPT | Performed by: INTERNAL MEDICINE

## 2020-08-23 PROCEDURE — 82728 ASSAY OF FERRITIN: CPT | Performed by: INTERNAL MEDICINE

## 2020-08-23 PROCEDURE — 82550 ASSAY OF CK (CPK): CPT | Performed by: INTERNAL MEDICINE

## 2020-08-23 PROCEDURE — 85025 COMPLETE CBC W/AUTO DIFF WBC: CPT | Performed by: INTERNAL MEDICINE

## 2020-08-23 PROCEDURE — 84145 PROCALCITONIN (PCT): CPT | Performed by: INTERNAL MEDICINE

## 2020-08-23 PROCEDURE — 86301 IMMUNOASSAY TUMOR CA 19-9: CPT | Performed by: INTERNAL MEDICINE

## 2020-08-23 PROCEDURE — 84100 ASSAY OF PHOSPHORUS: CPT | Performed by: INTERNAL MEDICINE

## 2020-08-23 RX ORDER — MAGNESIUM SULFATE HEPTAHYDRATE 40 MG/ML
2 INJECTION, SOLUTION INTRAVENOUS ONCE
Status: COMPLETED | OUTPATIENT
Start: 2020-08-23 | End: 2020-08-23

## 2020-08-23 RX ORDER — LEVETIRACETAM 500 MG/1
1000 TABLET ORAL EVERY 12 HOURS SCHEDULED
Status: DISCONTINUED | OUTPATIENT
Start: 2020-08-23 | End: 2020-08-29 | Stop reason: HOSPADM

## 2020-08-23 RX ORDER — MELATONIN
1000 DAILY
Status: DISCONTINUED | OUTPATIENT
Start: 2020-08-24 | End: 2020-08-29 | Stop reason: HOSPADM

## 2020-08-23 RX ORDER — DOCUSATE SODIUM 100 MG/1
100 CAPSULE, LIQUID FILLED ORAL 2 TIMES DAILY
Status: DISCONTINUED | OUTPATIENT
Start: 2020-08-23 | End: 2020-08-29 | Stop reason: HOSPADM

## 2020-08-23 RX ORDER — POLYETHYLENE GLYCOL 3350 17 G/17G
17 POWDER, FOR SOLUTION ORAL DAILY PRN
Status: DISCONTINUED | OUTPATIENT
Start: 2020-08-23 | End: 2020-08-29 | Stop reason: HOSPADM

## 2020-08-23 RX ADMIN — Medication 1000 MG: at 08:28

## 2020-08-23 RX ADMIN — LEVETIRACETAM 1000 MG: 500 TABLET ORAL at 22:01

## 2020-08-23 RX ADMIN — HYDROMORPHONE HYDROCHLORIDE 1 MG: 1 INJECTION, SOLUTION INTRAMUSCULAR; INTRAVENOUS; SUBCUTANEOUS at 04:31

## 2020-08-23 RX ADMIN — PANTOPRAZOLE SODIUM 40 MG: 40 INJECTION, POWDER, FOR SOLUTION INTRAVENOUS at 08:21

## 2020-08-23 RX ADMIN — FOLIC ACID 1 MG: 1 TABLET ORAL at 08:21

## 2020-08-23 RX ADMIN — POTASSIUM CHLORIDE AND SODIUM CHLORIDE 100 ML/HR: 900; 150 INJECTION, SOLUTION INTRAVENOUS at 04:30

## 2020-08-23 RX ADMIN — DOCUSATE SODIUM 100 MG: 100 CAPSULE, LIQUID FILLED ORAL at 17:41

## 2020-08-23 RX ADMIN — POTASSIUM PHOSPHATE, MONOBASIC AND POTASSIUM PHOSPHATE, DIBASIC 12 MMOL: 224; 236 INJECTION, SOLUTION, CONCENTRATE INTRAVENOUS at 12:17

## 2020-08-23 RX ADMIN — HYDROMORPHONE HYDROCHLORIDE 1 MG: 1 INJECTION, SOLUTION INTRAMUSCULAR; INTRAVENOUS; SUBCUTANEOUS at 22:03

## 2020-08-23 RX ADMIN — THIAMINE HCL TAB 100 MG 100 MG: 100 TAB at 08:21

## 2020-08-23 RX ADMIN — POTASSIUM CHLORIDE AND SODIUM CHLORIDE 100 ML/HR: 900; 150 INJECTION, SOLUTION INTRAVENOUS at 14:30

## 2020-08-23 RX ADMIN — MAGNESIUM SULFATE IN WATER 2 G: 40 INJECTION, SOLUTION INTRAVENOUS at 09:10

## 2020-08-23 RX ADMIN — HYDROMORPHONE HYDROCHLORIDE 0.5 MG: 1 INJECTION, SOLUTION INTRAMUSCULAR; INTRAVENOUS; SUBCUTANEOUS at 08:29

## 2020-08-23 RX ADMIN — HYDROMORPHONE HYDROCHLORIDE 0.5 MG: 1 INJECTION, SOLUTION INTRAMUSCULAR; INTRAVENOUS; SUBCUTANEOUS at 17:47

## 2020-08-23 RX ADMIN — LISINOPRIL 10 MG: 10 TABLET ORAL at 08:21

## 2020-08-23 RX ADMIN — MULTIPLE VITAMINS W/ MINERALS TAB 1 TABLET: TAB at 08:21

## 2020-08-23 RX ADMIN — Medication 100 MCG: at 08:20

## 2020-08-23 NOTE — PROGRESS NOTES
Progress Note - Kirby Freire 1965, 48 y o  male MRN: [id number]    Unit/Bed#: 426-01 Encounter: [id number]    Primary Care Provider: Komal Fernando PA-C   Date and time admitted to hospital: 8/20/2020  5:22 PM        * Pancreatitis, alcoholic, acute  Assessment & Plan  · Advance to a surgical soft/lite diet/low fat diet  · Continue NSS IV fluids with 20 meQ KCl at 100 ml/hr  · Utilize PRN IV dilaudid and PRN IV zofran  · Serial abdominal examinations  · Follow the lipase level  · Consult Gastroenterology    Hypomagnesemia  Assessment & Plan  · Give magnesium sulfate 2 grams IV x 1 dose  · Follow the magnesium level    Alcohol dependence (HCC)  Assessment & Plan  · Continue the CIWA protocol  · Continue thiamine 100 mg PO Qdaily, folic acid 1 mg PO Qdaily, and a daily PO multivitamin    Transaminitis  Assessment & Plan  · Likely secondary to alcohol abuse  · Check an acute hepatitis panel  · Avoid all hepatotoxic agents  · Follow the LFT's (liver function tests)    Constipation  Assessment & Plan  · Continue colace 100 mg PO BID  · Utilize miralax 17 grams PO Qdaily PRN constipation    Tobacco use  Assessment & Plan  · Nicotine patch  · Smoking cessation counseling    Pancytopenia ([address] )  Assessment & Plan  · Likely secondary to alcohol abuse  · Follow the CBC  · Outpatient Hematology/Oncology evaluation    Pancreatic mass  Assessment & Plan  · Check a CA 19-9 level  · Needs an endoscopic ultrasound completed at some point to rule-out malignancy  · Consult Gastroenterology    MRI of the abdomen (08/05/2020): IMPRESSION:     20 mm cystic lesion in the uncinate process of the pancreas shows a short interval increase in size and complexity when compared to July  Relatively rapid evolution probably favors postinflammatory pseudocyst   However, apparent nonenhancing mural nodularity is a worrisome feature warranting gastroenterology referral for endoscopic ultrasound  No high risk features      Celiac artery Recommended observation. stenosis [facility])  Assessment & Plan  · Outpatient Vascular Surgery evaluation    Traore esophagus  Assessment & Plan  · Continue PPI treatment  · Outpatient surveillance imaging with Gastroenterology    Thrombocytopenia ([address] )  Assessment & Plan  · Likely secondary to alcohol abuse  · Monitor for any signs of bleeding  · Follow the platelet count    Hx of seizure disorder  Assessment & Plan  · Change IV keppra to keppra 1000 mg PO every 12 hours  · Outpatient follow-up with Neurology    Hypokalemia  Assessment & Plan  · Improved with potassium chloride supplementation treatment  · Follow the potassium level    Hypophosphatemia  Assessment & Plan  · Give potassium phosphate 12 mmol IV x 1 dose  · Follow the phosphorus level    Bladder wall thickening  Assessment & Plan  · Observed on CT scan  · Check a urinalysis and urine culture    Vitamin D deficiency  Assessment & Plan  · Utilize cholecalciferol 1000 I  U  PO Qdaily, which should be continued upon discharge    Hepatic steatosis  Assessment & Plan  · Likely secondary to alcohol abuse  · Alcohol cessation counseling was given to the patient  · Lifestyle modifications are recommended including weight loss, improving his diet, and increasing his amount of activity  · Avoid all hepatotoxic agents  · Follow the LFT's (liver function tests)  · Outpatient surveillance imaging and follow-up with Gastroenterology    Essential hypertension  Assessment & Plan  · Continue to hold PO lisinopril for now  · Utilize PRN IV labetalol  · Follow the blood pressure trend      VTE Pharmacologic Prophylaxis:   Pharmacologic: Pharmacologic VTE Prophylaxis contraindicated due to thrombocytopenia  Mechanical VTE Prophylaxis in Place: Yes    Patient Centered Rounds: I have performed bedside rounds with nursing staff today  Time Spent for Care: 30 minutes  More than 50% of total time spent on counseling and coordination of care as described above      Current Length of Stay: 2 day(s)    Current Patient Status: Inpatient   Certification Statement: The patient continues to require additional inpatient hospitalization with the patient requiring IV pain medications to achieve adequate pain control, for continuous IV fluids, to monitor the patient while his diet is advanced, and for a Gastroenterology evaluation  Code Status: Level 1 - Full Code      Subjective: The patient was seen and examined  The patient is doing somewhat better  The abdominal pain is improving  The patient is tolerating clear liquid diet at this time  Objective:     Vitals:   Temp (24hrs), Av 4 °F (36 9 °C), Min:98 1 °F (36 7 °C), Max:98 8 °F (37 1 °C)    Temp:  [98 1 °F (36 7 °C)-98 8 °F (37 1 °C)] 98 1 °F (36 7 °C)  HR:  [71-85] 73  Resp:  [18-20] 20  BP: (130-153)/(82-93) 135/93  SpO2:  [96 %-99 %] 98 %  Body mass index is 20 53 kg/m²  Input and Output Summary (last 24 hours):        Intake/Output Summary (Last 24 hours) at 2020 1428  Last data filed at 2020 1215  Gross per 24 hour   Intake 2120 ml   Output 340 ml   Net 1780 ml       Physical Exam:     Physical Exam  General:  NAD, follows commands  HEENT:  NC/AT, mucous membranes moist  Neck:  Supple, No JVP elevation  CV:  + S1, + S2, RRR  Pulm:  Lung fields are CTA bilaterally  Abd:  Soft, Epigastric abdominal pain with palpation, Mild distension  Ext:  No clubbing/cyanosis/edema  Skin:  No rashes  Neuro:  Awake, alert, oriented  Psych:  Normal mood and affect      Additional Data:    Labs:    Results from last 7 days   Lab Units 20  0608   WBC Thousand/uL 3 86*   HEMOGLOBIN g/dL 9 2*   HEMATOCRIT % 31 0*   PLATELETS Thousands/uL 90*   NEUTROS PCT % 58   LYMPHS PCT % 22   MONOS PCT % 17*   EOS PCT % 2     Results from last 7 days   Lab Units 20  0608   SODIUM mmol/L 131*   POTASSIUM mmol/L 3 7   CHLORIDE mmol/L 97*   CO2 mmol/L 32   BUN mg/dL 2*   CREATININE mg/dL 0 62   ANION GAP mmol/L 2*   CALCIUM mg/dL 7 7*   ALBUMIN g/dL 3 1*   TOTAL BILIRUBIN mg/dL 0 80   ALK PHOS U/L 112   ALT U/L 32   AST U/L 55*   GLUCOSE RANDOM mg/dL 126     Results from last 7 days   Lab Units 08/21/20  0543   INR  1 18             Results from last 7 days   Lab Units 08/23/20  0608 08/20/20  1736   LACTIC ACID mmol/L 1 3 1 9   PROCALCITONIN ng/ml <0 05  --            * I Have Reviewed All Lab Data Listed Above  * Additional Pertinent Lab Tests Reviewed:  Yamilka Lowry Admission Reviewed      Recent Cultures (last 7 days):           Last 24 Hours Medication List:   Current Facility-Administered Medications   Medication Dose Route Frequency Provider Last Rate    albuterol  2 puff Inhalation Q4H PRN Yves Baez MD      albuterol  2 5 mg Nebulization Q6H PRN Yves Baez MD      [START ON 8/24/2020] cholecalciferol  1,000 Units Oral Daily Antony Fast, DO      cyanocobalamin  100 mcg Oral Daily Yves Baez MD      docusate sodium  100 mg Oral BID Bobby Fast, DO      folic acid  1 mg Oral Daily Yves Baez MD      HYDROmorphone  0 5 mg Intravenous Q3H PRN Antony Fast, DO      Or    HYDROmorphone  1 mg Intravenous Q3H PRN Antony Fast, DO      Labetalol HCl  10 mg Intravenous Q4H PRN Bobby Fast, DO      levETIRAcetam  1,000 mg Oral Q12H [address] [facility], DO      lisinopril  10 mg Oral Daily Antony Fast, DO      multivitamin-minerals  1 tablet Oral Daily Yves Baez MD      nicotine  1 patch Transdermal Daily Yves Baez MD      ondansetron  4 mg Intravenous Q4H PRN Yves Baez MD      pantoprazole  40 mg Intravenous Q24H [address] Yves Baez MD      polyethylene glycol  17 g Oral Daily PRN Antony Fast, DO      potassium phosphate  12 mmol Intravenous Once Antony Fast, DO 12 mmol (08/23/20 1217)    sodium chloride (PF)  3 mL Intravenous Q1H PRN Jonathan Owens, DO      sodium chloride 0 9 % with KCl 20 mEq/L  100 mL/hr Intravenous Continuous Shayna Lange  mL/hr (08/23/20 0430)    thiamine  100 mg Oral Daily Padmini Roberts MD          Today, Patient Was Seen By: Shayna Lange DO    ** Please Note: Dictation voice to text software may have been used in the creation of this document   **

## 2020-08-23 NOTE — ASSESSMENT & PLAN NOTE
· Likely secondary to alcohol abuse  · Alcohol cessation counseling was given to the patient  · Lifestyle modifications are recommended including weight loss, improving his diet, and increasing his amount of activity    · Avoid all hepatotoxic agents  · Follow the LFT's (liver function tests)  · Outpatient surveillance imaging and follow-up with Gastroenterology

## 2020-08-23 NOTE — PLAN OF CARE
Problem: PAIN - ADULT  Goal: Verbalizes/displays adequate comfort level or baseline comfort level  Description: Interventions:  - Encourage patient to monitor pain and request assistance  - Assess pain using appropriate pain scale  - Administer analgesics based on type and severity of pain and evaluate response  - Implement non-pharmacological measures as appropriate and evaluate response  - Consider cultural and social influences on pain and pain management  - Notify physician/advanced practitioner if interventions unsuccessful or patient reports new pain  Outcome: Progressing     Problem: INFECTION - ADULT  Goal: Absence or prevention of progression during hospitalization  Description: INTERVENTIONS:  - Assess and monitor for signs and symptoms of infection  - Monitor lab/diagnostic results  - Monitor all insertion sites, i e  indwelling lines, tubes, and drains  - Monitor endotracheal if appropriate and nasal secretions for changes in amount and color  - [place] appropriate cooling/warming therapies per order  - Administer medications as ordered  - Instruct and encourage patient and family to use good hand hygiene technique  - Identify and instruct in appropriate isolation precautions for identified infection/condition  Outcome: Progressing     Problem: SAFETY ADULT  Goal: Patient will remain free of falls  Description: INTERVENTIONS:  - Assess patient frequently for physical needs  -  Identify cognitive and physical deficits and behaviors that affect risk of falls    -  [place] fall precautions as indicated by assessment   - Educate patient/family on patient safety including physical limitations  - Instruct patient to call for assistance with activity based on assessment  - Modify environment to reduce risk of injury  - Consider OT/PT consult to assist with strengthening/mobility  Outcome: Progressing  Goal: Maintain or return to baseline ADL function  Description: INTERVENTIONS:  -  Assess patient's ability to carry out ADLs; assess patient's baseline for ADL function and identify physical deficits which impact ability to perform ADLs (bathing, care of mouth/teeth, toileting, grooming, dressing, etc )  - Assess/evaluate cause of self-care deficits   - Assess range of motion  - Assess patient's mobility; develop plan if impaired  - Assess patient's need for assistive devices and provide as appropriate  - Encourage maximum independence but intervene and supervise when necessary  - Involve family in performance of ADLs  - Assess for home care needs following discharge   - Consider OT consult to assist with ADL evaluation and planning for discharge  - Provide patient education as appropriate  Outcome: Progressing  Goal: Maintain or return mobility status to optimal level  Description: INTERVENTIONS:  - Assess patient's baseline mobility status (ambulation, transfers, stairs, etc )    - Identify cognitive and physical deficits and behaviors that affect mobility  - Identify mobility aids required to assist with transfers and/or ambulation (gait belt, sit-to-stand, lift, walker, cane, etc )  - [place] fall precautions as indicated by assessment  - Record patient progress and toleration of activity level on Mobility SBAR; progress patient to next Phase/Stage  - Instruct patient to call for assistance with activity based on assessment  - Consider rehabilitation consult to assist with strengthening/weightbearing, etc   Outcome: Progressing     Problem: DISCHARGE PLANNING  Goal: Discharge to home or other facility with appropriate resources  Description: INTERVENTIONS:  - Identify barriers to discharge w/patient and caregiver  - Arrange for needed discharge resources and transportation as appropriate  - Identify discharge learning needs (meds, wound care, etc )  - Arrange for interpretive services to assist at discharge as needed  - Refer to Case Management Department for coordinating discharge planning if the patient needs post-hospital services based on physician/advanced practitioner order or complex needs related to functional status, cognitive ability, or social support system  Outcome: Progressing     Problem: Knowledge Deficit  Goal: Patient/family/caregiver demonstrates understanding of disease process, treatment plan, medications, and discharge instructions  Description: Complete learning assessment and assess knowledge base    Interventions:  - Provide teaching at level of understanding  - Provide teaching via preferred learning methods  Outcome: Progressing     Problem: GASTROINTESTINAL - ADULT  Goal: Minimal or absence of nausea and/or vomiting  Description: INTERVENTIONS:  - Administer IV fluids if ordered to ensure adequate hydration  - Maintain NPO status until nausea and vomiting are resolved  - Nasogastric tube if ordered  - Administer ordered antiemetic medications as needed  - Provide nonpharmacologic comfort measures as appropriate  - Advance diet as tolerated, if ordered  - Consider nutrition services referral to assist patient with adequate nutrition and appropriate food choices  Outcome: Progressing     Problem: METABOLIC, FLUID AND ELECTROLYTES - ADULT  Goal: Electrolytes maintained within normal limits  Description: INTERVENTIONS:  - Monitor labs and assess patient for signs and symptoms of electrolyte imbalances  - Administer electrolyte replacement as ordered  - Monitor response to electrolyte replacements, including repeat lab results as appropriate  - Instruct patient on fluid and nutrition as appropriate  Outcome: Progressing

## 2020-08-23 NOTE — ASSESSMENT & PLAN NOTE
· Likely secondary to alcohol abuse  · Check an acute hepatitis panel  · Avoid all hepatotoxic agents  · Follow the LFT's (liver function tests) vital signs

## 2020-08-23 NOTE — ASSESSMENT & PLAN NOTE
· Advance to a surgical soft/lite diet/low fat diet  · Continue NSS IV fluids with 20 meQ KCl at 100 ml/hr  · Utilize PRN IV dilaudid and PRN IV zofran  · Serial abdominal examinations  · Follow the lipase level  · Consult Gastroenterology

## 2020-08-23 NOTE — ASSESSMENT & PLAN NOTE
· Check a CA 19-9 level  · Needs an endoscopic ultrasound completed at some point to rule-out malignancy  · Consult Gastroenterology    MRI of the abdomen (08/05/2020): IMPRESSION:     20 mm cystic lesion in the uncinate process of the pancreas shows a short interval increase in size and complexity when compared to July  Relatively rapid evolution probably favors postinflammatory pseudocyst   However, apparent nonenhancing mural nodularity is a worrisome feature warranting gastroenterology referral for endoscopic ultrasound  No high risk features

## 2020-08-24 LAB
ALBUMIN SERPL BCP-MCNC: 3.4 G/DL (ref 3.5–5)
ALP SERPL-CCNC: 124 U/L (ref 46–116)
ALT SERPL W P-5'-P-CCNC: 35 U/L (ref 12–78)
ANION GAP SERPL CALCULATED.3IONS-SCNC: 6 MMOL/L (ref 4–13)
AST SERPL W P-5'-P-CCNC: 58 U/L (ref 5–45)
BASOPHILS # BLD AUTO: 0.02 THOUSANDS/ΜL (ref 0–0.1)
BASOPHILS NFR BLD AUTO: 1 % (ref 0–1)
BILIRUB SERPL-MCNC: 0.9 MG/DL (ref 0.2–1)
BUN SERPL-MCNC: 2 MG/DL (ref 5–25)
CA-I BLD-SCNC: 1.1 MMOL/L (ref 1.12–1.32)
CALCIUM SERPL-MCNC: 8.6 MG/DL (ref 8.3–10.1)
CHLORIDE SERPL-SCNC: 96 MMOL/L (ref 100–108)
CO2 SERPL-SCNC: 29 MMOL/L (ref 21–32)
CREAT SERPL-MCNC: 0.57 MG/DL (ref 0.6–1.3)
EOSINOPHIL # BLD AUTO: 0.09 THOUSAND/ΜL (ref 0–0.61)
EOSINOPHIL NFR BLD AUTO: 2 % (ref 0–6)
ERYTHROCYTE [DISTWIDTH] IN BLOOD BY AUTOMATED COUNT: 17.2 % (ref 11.6–15.1)
GFR SERPL CREATININE-BSD FRML MDRD: 116 ML/MIN/1.73SQ M
GLUCOSE SERPL-MCNC: 109 MG/DL (ref 65–140)
HAV IGM SER QL: NORMAL
HBV CORE IGM SER QL: NORMAL
HBV SURFACE AG SER QL: NORMAL
HCT VFR BLD AUTO: 32.2 % (ref 36.5–49.3)
HCV AB SER QL: NORMAL
HGB BLD-MCNC: 9.6 G/DL (ref 12–17)
IMM GRANULOCYTES # BLD AUTO: 0.01 THOUSAND/UL (ref 0–0.2)
IMM GRANULOCYTES NFR BLD AUTO: 0 % (ref 0–2)
LIPASE SERPL-CCNC: 178 U/L (ref 73–393)
LYMPHOCYTES # BLD AUTO: 1.09 THOUSANDS/ΜL (ref 0.6–4.47)
LYMPHOCYTES NFR BLD AUTO: 29 % (ref 14–44)
MAGNESIUM SERPL-MCNC: 1.6 MG/DL (ref 1.6–2.6)
MCH RBC QN AUTO: 24.4 PG (ref 26.8–34.3)
MCHC RBC AUTO-ENTMCNC: 29.8 G/DL (ref 31.4–37.4)
MCV RBC AUTO: 82 FL (ref 82–98)
MONOCYTES # BLD AUTO: 0.62 THOUSAND/ΜL (ref 0.17–1.22)
MONOCYTES NFR BLD AUTO: 17 % (ref 4–12)
NEUTROPHILS # BLD AUTO: 1.89 THOUSANDS/ΜL (ref 1.85–7.62)
NEUTS SEG NFR BLD AUTO: 51 % (ref 43–75)
NRBC BLD AUTO-RTO: 0 /100 WBCS
PHOSPHATE SERPL-MCNC: 3.1 MG/DL (ref 2.7–4.5)
PLATELET # BLD AUTO: 114 THOUSANDS/UL (ref 149–390)
PMV BLD AUTO: 10.2 FL (ref 8.9–12.7)
POTASSIUM SERPL-SCNC: 3.7 MMOL/L (ref 3.5–5.3)
PROCALCITONIN SERPL-MCNC: <0.05 NG/ML
PROT SERPL-MCNC: 7.6 G/DL (ref 6.4–8.2)
RBC # BLD AUTO: 3.93 MILLION/UL (ref 3.88–5.62)
SODIUM SERPL-SCNC: 131 MMOL/L (ref 136–145)
TROPONIN I SERPL-MCNC: <0.02 NG/ML
WBC # BLD AUTO: 3.72 THOUSAND/UL (ref 4.31–10.16)

## 2020-08-24 PROCEDURE — C9113 INJ PANTOPRAZOLE SODIUM, VIA: HCPCS | Performed by: HOSPITALIST

## 2020-08-24 PROCEDURE — 84484 ASSAY OF TROPONIN QUANT: CPT | Performed by: INTERNAL MEDICINE

## 2020-08-24 PROCEDURE — 80053 COMPREHEN METABOLIC PANEL: CPT | Performed by: INTERNAL MEDICINE

## 2020-08-24 PROCEDURE — 84100 ASSAY OF PHOSPHORUS: CPT | Performed by: INTERNAL MEDICINE

## 2020-08-24 PROCEDURE — 80074 ACUTE HEPATITIS PANEL: CPT | Performed by: INTERNAL MEDICINE

## 2020-08-24 PROCEDURE — 85025 COMPLETE CBC W/AUTO DIFF WBC: CPT | Performed by: INTERNAL MEDICINE

## 2020-08-24 PROCEDURE — 83690 ASSAY OF LIPASE: CPT | Performed by: INTERNAL MEDICINE

## 2020-08-24 PROCEDURE — 83735 ASSAY OF MAGNESIUM: CPT | Performed by: INTERNAL MEDICINE

## 2020-08-24 PROCEDURE — 99232 SBSQ HOSP IP/OBS MODERATE 35: CPT | Performed by: INTERNAL MEDICINE

## 2020-08-24 PROCEDURE — 84145 PROCALCITONIN (PCT): CPT | Performed by: INTERNAL MEDICINE

## 2020-08-24 PROCEDURE — 82330 ASSAY OF CALCIUM: CPT | Performed by: INTERNAL MEDICINE

## 2020-08-24 RX ORDER — HEPARIN SODIUM 5000 [USP'U]/ML
5000 INJECTION, SOLUTION INTRAVENOUS; SUBCUTANEOUS EVERY 8 HOURS SCHEDULED
Status: DISCONTINUED | OUTPATIENT
Start: 2020-08-24 | End: 2020-08-28

## 2020-08-24 RX ADMIN — HYDROMORPHONE HYDROCHLORIDE 1 MG: 1 INJECTION, SOLUTION INTRAMUSCULAR; INTRAVENOUS; SUBCUTANEOUS at 14:46

## 2020-08-24 RX ADMIN — HEPARIN SODIUM 5000 UNITS: 5000 INJECTION INTRAVENOUS; SUBCUTANEOUS at 13:33

## 2020-08-24 RX ADMIN — VITAMIN D, TAB 1000IU (100/BT) 1000 UNITS: 25 TAB at 08:58

## 2020-08-24 RX ADMIN — LEVETIRACETAM 1000 MG: 500 TABLET ORAL at 21:12

## 2020-08-24 RX ADMIN — DOCUSATE SODIUM 100 MG: 100 CAPSULE, LIQUID FILLED ORAL at 08:58

## 2020-08-24 RX ADMIN — HEPARIN SODIUM 5000 UNITS: 5000 INJECTION INTRAVENOUS; SUBCUTANEOUS at 21:12

## 2020-08-24 RX ADMIN — PANTOPRAZOLE SODIUM 40 MG: 40 INJECTION, POWDER, FOR SOLUTION INTRAVENOUS at 08:58

## 2020-08-24 RX ADMIN — LISINOPRIL 10 MG: 10 TABLET ORAL at 08:58

## 2020-08-24 RX ADMIN — THIAMINE HCL TAB 100 MG 100 MG: 100 TAB at 08:58

## 2020-08-24 RX ADMIN — HYDROMORPHONE HYDROCHLORIDE 1 MG: 1 INJECTION, SOLUTION INTRAMUSCULAR; INTRAVENOUS; SUBCUTANEOUS at 04:37

## 2020-08-24 RX ADMIN — LEVETIRACETAM 1000 MG: 500 TABLET ORAL at 08:58

## 2020-08-24 RX ADMIN — HYDROMORPHONE HYDROCHLORIDE 1 MG: 1 INJECTION, SOLUTION INTRAMUSCULAR; INTRAVENOUS; SUBCUTANEOUS at 20:04

## 2020-08-24 RX ADMIN — HYDROMORPHONE HYDROCHLORIDE 1 MG: 1 INJECTION, SOLUTION INTRAMUSCULAR; INTRAVENOUS; SUBCUTANEOUS at 09:07

## 2020-08-24 RX ADMIN — POTASSIUM CHLORIDE AND SODIUM CHLORIDE 100 ML/HR: 900; 150 INJECTION, SOLUTION INTRAVENOUS at 00:15

## 2020-08-24 RX ADMIN — DOCUSATE SODIUM 100 MG: 100 CAPSULE, LIQUID FILLED ORAL at 18:14

## 2020-08-24 RX ADMIN — FOLIC ACID 1 MG: 1 TABLET ORAL at 08:58

## 2020-08-24 RX ADMIN — Medication 100 MCG: at 08:58

## 2020-08-24 RX ADMIN — POTASSIUM CHLORIDE AND SODIUM CHLORIDE 100 ML/HR: 900; 150 INJECTION, SOLUTION INTRAVENOUS at 20:04

## 2020-08-24 RX ADMIN — MULTIPLE VITAMINS W/ MINERALS TAB 1 TABLET: TAB at 08:58

## 2020-08-24 NOTE — ASSESSMENT & PLAN NOTE
· Diet advanced to a surgical soft/lite diet/low fat diet - but patient says that he is not tolerating it too well  Cont to monitor for now    · Continue NSS IV fluids with 20 meQ KCl at 100 ml/hr  · Utilize PRN IV dilaudid and PRN IV zofran

## 2020-08-24 NOTE — SOCIAL WORK
CM continuing to follow patient  Although improving not ready to leave yet, needs to see GI for diet vs pain

## 2020-08-24 NOTE — PROGRESS NOTES
Progress Note - Jean-Claude Groves 1965, 48 y o  male MRN: [id number]    Unit/Bed#: 426-01 Encounter: [id number]    Primary Care Provider: Chantale Lopez PA-C   Date and time admitted to hospital: 2020  5:22 PM        * Pancreatitis, alcoholic, acute  Assessment & Plan  · Diet advanced to a surgical soft/lite diet/low fat diet - but patient says that he is not tolerating it too well  Cont to monitor for now  · Continue NSS IV fluids with 20 meQ KCl at 100 ml/hr  · Utilize PRN IV dilaudid and PRN IV zofran    Hx of seizure disorder  Assessment & Plan  · Continue Keppra  · Outpatient follow-up with Neurology    Essential hypertension  Assessment & Plan  · Continue to hold PO lisinopril for now  · Utilize PRN IV labetalol  · Follow the blood pressure trend    Alcohol dependence (HCC)  Assessment & Plan  · Discontinue the CIWA protocol  · Continue thiamine 100 mg PO Qdaily, folic acid 1 mg PO Qdaily, and a daily PO multivitamin  · Pt refused rehab      VTE Pharmacologic Prophylaxis:   Pharmacologic: Heparin  Mechanical VTE Prophylaxis in Place: Yes    Patient Centered Rounds: I have performed bedside rounds with nursing staff today  Discussions with Specialists or Other Care Team Provider:     Education and Discussions with Family / Patient: pt  Offered to call family but refused  Time Spent for Care: 20 minutes  More than 50% of total time spent on counseling and coordination of care as described above  Current Length of Stay: 3 day(s)    Current Patient Status: Inpatient   Certification Statement: The patient will continue to require additional inpatient hospital stay due to above    Discharge Plan: in next 1-2 days once tolerates diet  Code Status: Level 1 - Full Code      Subjective:   Pt seen and examined by me this morning   Pt complains of pian in his chest and upper abdomen since     Objective:     Vitals:   Temp (24hrs), Av 4 °F (36 9 °C), Min:98 1 °F (36 7 °C), Max:98 8 °F (37 1 °C)    Temp:  [98 1 °F (36 7 °C)-98 8 °F (37 1 °C)] 98 1 °F (36 7 °C)  HR:  [66-82] 75  Resp:  [16-20] 16  BP: (133-139)/(88-93) 139/88  SpO2:  [98 %-100 %] 99 %  Body mass index is 20 53 kg/m²  Input and Output Summary (last 24 hours): Intake/Output Summary (Last 24 hours) at 8/24/2020 1218  Last data filed at 8/24/2020 0848  Gross per 24 hour   Intake 2515 ml   Output    Net 2515 ml       Physical Exam:     Physical Exam    Constitutional: Pt appears comfortable  Not in any acute distress  Cardiovascular: Normal rate, regular rhythm, normal heart sounds  No murmur heard  Pulmonary/Chest: Effort normal, air entry b/l equal  No respiratory distress  Pt has no wheezes or crackles  Abdominal: Soft  Non-distended, mild epigastric tenderness, no guarding or rigidity  Bowel sounds are normal    Musculoskeletal: Normal range of motion  Neurological: awake, alert  Moving all extremities spontaneously  Psychiatric: normal mood and affect  Additional Data:     Labs:    Results from last 7 days   Lab Units 08/24/20  0510   WBC Thousand/uL 3 72*   HEMOGLOBIN g/dL 9 6*   HEMATOCRIT % 32 2*   PLATELETS Thousands/uL 114*   NEUTROS PCT % 51   LYMPHS PCT % 29   MONOS PCT % 17*   EOS PCT % 2     Results from last 7 days   Lab Units 08/24/20  0510   SODIUM mmol/L 131*   POTASSIUM mmol/L 3 7   CHLORIDE mmol/L 96*   CO2 mmol/L 29   BUN mg/dL 2*   CREATININE mg/dL 0 57*   ANION GAP mmol/L 6   CALCIUM mg/dL 8 6   ALBUMIN g/dL 3 4*   TOTAL BILIRUBIN mg/dL 0 90   ALK PHOS U/L 124*   ALT U/L 35   AST U/L 58*   GLUCOSE RANDOM mg/dL 109     Results from last 7 days   Lab Units 08/21/20  0543   INR  1 18             Results from last 7 days   Lab Units 08/23/20  0608 08/20/20  1736   LACTIC ACID mmol/L 1 3 1 9   PROCALCITONIN ng/ml <0 05  --            * I Have Reviewed All Lab Data Listed Above  * Additional Pertinent Lab Tests Reviewed:  [facility] Admission Reviewed    Imaging:    Imaging Reports Reviewed Today Include:   Imaging Personally Reviewed by Myself Includes:     Recent Cultures (last 7 days):     Results from last 7 days   Lab Units 08/22/20  0950   URINE CULTURE  No Growth <1000 cfu/mL       Last 24 Hours Medication List:   Current Facility-Administered Medications   Medication Dose Route Frequency Provider Last Rate    albuterol  2 puff Inhalation Q4H PRN Jenny Glynn MD      albuterol  2 5 mg Nebulization Q6H PRN Jenny Glynn MD      cholecalciferol  1,000 Units Oral Daily Donny, DO      cyanocobalamin  100 mcg Oral Daily Jenny Glynn MD      docusate sodium  100 mg Oral BID Donny, DO      folic acid  1 mg Oral Daily Jenny Glynn MD      heparin (porcine)  5,000 Units Subcutaneous Q8H [address] Sally Adams MD      HYDROmorphone  0 5 mg Intravenous Q3H PRN Donny, DO      Or    HYDROmorphone  1 mg Intravenous Q3H PRN Donny, DO      Labetalol HCl  10 mg Intravenous Q4H PRN Donny, DO      levETIRAcetam  1,000 mg Oral Q12H [address] [facility], DO      lisinopril  10 mg Oral Daily Donny, DO      multivitamin-minerals  1 tablet Oral Daily Jenny Glynn MD      nicotine  1 patch Transdermal Daily Jenny Glynn MD      ondansetron  4 mg Intravenous Q4H PRN Jenny Glynn MD      pantoprazole  40 mg Intravenous Q24H [address] Jenny Glynn MD      polyethylene glycol  17 g Oral Daily PRN Donny, DO      sodium chloride (PF)  3 mL Intravenous Q1H PRN Squire Maxim, DO      sodium chloride 0 9 % with KCl 20 mEq/L  100 mL/hr Intravenous Continuous Donny,  mL/hr (08/24/20 0015)    thiamine  100 mg Oral Daily Jenny Glynn MD          Today, Patient Was Seen By: Sally Adams MD    ** Please Note: Dictation voice to text software may have been used in the creation of this document   **

## 2020-08-24 NOTE — ASSESSMENT & PLAN NOTE
· Discontinue the CIWA protocol  · Continue thiamine 100 mg PO Qdaily, folic acid 1 mg PO Qdaily, and a daily PO multivitamin  · Pt refused rehab

## 2020-08-24 NOTE — PLAN OF CARE
Problem: PAIN - ADULT  Goal: Verbalizes/displays adequate comfort level or baseline comfort level  Description: Interventions:  - Encourage patient to monitor pain and request assistance  - Assess pain using appropriate pain scale  - Administer analgesics based on type and severity of pain and evaluate response  - Implement non-pharmacological measures as appropriate and evaluate response  - Consider cultural and social influences on pain and pain management  - Notify physician/advanced practitioner if interventions unsuccessful or patient reports new pain  Outcome: Progressing     Problem: INFECTION - ADULT  Goal: Absence or prevention of progression during hospitalization  Description: INTERVENTIONS:  - Assess and monitor for signs and symptoms of infection  - Monitor lab/diagnostic results  - Monitor all insertion sites, i e  indwelling lines, tubes, and drains  - Monitor endotracheal if appropriate and nasal secretions for changes in amount and color  - [place] appropriate cooling/warming therapies per order  - Administer medications as ordered  - Instruct and encourage patient and family to use good hand hygiene technique  - Identify and instruct in appropriate isolation precautions for identified infection/condition  Outcome: Progressing     Problem: SAFETY ADULT  Goal: Patient will remain free of falls  Description: INTERVENTIONS:  - Assess patient frequently for physical needs  -  Identify cognitive and physical deficits and behaviors that affect risk of falls    -  [place] fall precautions as indicated by assessment   - Educate patient/family on patient safety including physical limitations  - Instruct patient to call for assistance with activity based on assessment  - Modify environment to reduce risk of injury  - Consider OT/PT consult to assist with strengthening/mobility  Outcome: Progressing  Goal: Maintain or return to baseline ADL function  Description: INTERVENTIONS:  -  Assess patient's ability to carry out ADLs; assess patient's baseline for ADL function and identify physical deficits which impact ability to perform ADLs (bathing, care of mouth/teeth, toileting, grooming, dressing, etc )  - Assess/evaluate cause of self-care deficits   - Assess range of motion  - Assess patient's mobility; develop plan if impaired  - Assess patient's need for assistive devices and provide as appropriate  - Encourage maximum independence but intervene and supervise when necessary  - Involve family in performance of ADLs  - Assess for home care needs following discharge   - Consider OT consult to assist with ADL evaluation and planning for discharge  - Provide patient education as appropriate  Outcome: Progressing  Goal: Maintain or return mobility status to optimal level  Description: INTERVENTIONS:  - Assess patient's baseline mobility status (ambulation, transfers, stairs, etc )    - Identify cognitive and physical deficits and behaviors that affect mobility  - Identify mobility aids required to assist with transfers and/or ambulation (gait belt, sit-to-stand, lift, walker, cane, etc )  - [place] fall precautions as indicated by assessment  - Record patient progress and toleration of activity level on Mobility SBAR; progress patient to next Phase/Stage  - Instruct patient to call for assistance with activity based on assessment  - Consider rehabilitation consult to assist with strengthening/weightbearing, etc   Outcome: Progressing     Problem: DISCHARGE PLANNING  Goal: Discharge to home or other facility with appropriate resources  Description: INTERVENTIONS:  - Identify barriers to discharge w/patient and caregiver  - Arrange for needed discharge resources and transportation as appropriate  - Identify discharge learning needs (meds, wound care, etc )  - Arrange for interpretive services to assist at discharge as needed  - Refer to Case Management Department for coordinating discharge planning if the patient needs post-hospital services based on physician/advanced practitioner order or complex needs related to functional status, cognitive ability, or social support system  Outcome: Progressing     Problem: Knowledge Deficit  Goal: Patient/family/caregiver demonstrates understanding of disease process, treatment plan, medications, and discharge instructions  Description: Complete learning assessment and assess knowledge base    Interventions:  - Provide teaching at level of understanding  - Provide teaching via preferred learning methods  Outcome: Progressing     Problem: GASTROINTESTINAL - ADULT  Goal: Minimal or absence of nausea and/or vomiting  Description: INTERVENTIONS:  - Administer IV fluids if ordered to ensure adequate hydration  - Maintain NPO status until nausea and vomiting are resolved  - Nasogastric tube if ordered  - Administer ordered antiemetic medications as needed  - Provide nonpharmacologic comfort measures as appropriate  - Advance diet as tolerated, if ordered  - Consider nutrition services referral to assist patient with adequate nutrition and appropriate food choices  Outcome: Progressing     Problem: METABOLIC, FLUID AND ELECTROLYTES - ADULT  Goal: Electrolytes maintained within normal limits  Description: INTERVENTIONS:  - Monitor labs and assess patient for signs and symptoms of electrolyte imbalances  - Administer electrolyte replacement as ordered  - Monitor response to electrolyte replacements, including repeat lab results as appropriate  - Instruct patient on fluid and nutrition as appropriate  Outcome: Progressing

## 2020-08-25 PROBLEM — D73.89 LESION OF SPLEEN: Status: ACTIVE | Noted: 2020-08-25

## 2020-08-25 PROBLEM — I44.4 LEFT ANTERIOR FASCICULAR BLOCK: Status: ACTIVE | Noted: 2020-08-25

## 2020-08-25 LAB
CANCER AG19-9 SERPL-ACNC: 1 U/ML (ref 0–35)
PROCALCITONIN SERPL-MCNC: <0.05 NG/ML

## 2020-08-25 PROCEDURE — C9113 INJ PANTOPRAZOLE SODIUM, VIA: HCPCS | Performed by: HOSPITALIST

## 2020-08-25 PROCEDURE — 84145 PROCALCITONIN (PCT): CPT | Performed by: INTERNAL MEDICINE

## 2020-08-25 PROCEDURE — 99232 SBSQ HOSP IP/OBS MODERATE 35: CPT | Performed by: INTERNAL MEDICINE

## 2020-08-25 PROCEDURE — 99254 IP/OBS CNSLTJ NEW/EST MOD 60: CPT | Performed by: INTERNAL MEDICINE

## 2020-08-25 RX ORDER — MAGNESIUM SULFATE HEPTAHYDRATE 40 MG/ML
2 INJECTION, SOLUTION INTRAVENOUS ONCE
Status: COMPLETED | OUTPATIENT
Start: 2020-08-25 | End: 2020-08-25

## 2020-08-25 RX ORDER — POTASSIUM CHLORIDE 20 MEQ/1
40 TABLET, EXTENDED RELEASE ORAL ONCE
Status: COMPLETED | OUTPATIENT
Start: 2020-08-25 | End: 2020-08-25

## 2020-08-25 RX ADMIN — POTASSIUM CHLORIDE 40 MEQ: 1500 TABLET, EXTENDED RELEASE ORAL at 10:38

## 2020-08-25 RX ADMIN — VITAMIN D, TAB 1000IU (100/BT) 1000 UNITS: 25 TAB at 09:31

## 2020-08-25 RX ADMIN — POTASSIUM CHLORIDE AND SODIUM CHLORIDE 100 ML/HR: 900; 150 INJECTION, SOLUTION INTRAVENOUS at 06:01

## 2020-08-25 RX ADMIN — LISINOPRIL 10 MG: 10 TABLET ORAL at 09:31

## 2020-08-25 RX ADMIN — HEPARIN SODIUM 5000 UNITS: 5000 INJECTION INTRAVENOUS; SUBCUTANEOUS at 05:18

## 2020-08-25 RX ADMIN — HYDROMORPHONE HYDROCHLORIDE 1 MG: 1 INJECTION, SOLUTION INTRAMUSCULAR; INTRAVENOUS; SUBCUTANEOUS at 06:21

## 2020-08-25 RX ADMIN — DOCUSATE SODIUM 100 MG: 100 CAPSULE, LIQUID FILLED ORAL at 09:31

## 2020-08-25 RX ADMIN — PANTOPRAZOLE SODIUM 40 MG: 40 INJECTION, POWDER, FOR SOLUTION INTRAVENOUS at 09:31

## 2020-08-25 RX ADMIN — MAGNESIUM SULFATE IN WATER 2 G: 40 INJECTION, SOLUTION INTRAVENOUS at 10:38

## 2020-08-25 RX ADMIN — HYDROMORPHONE HYDROCHLORIDE 1 MG: 1 INJECTION, SOLUTION INTRAMUSCULAR; INTRAVENOUS; SUBCUTANEOUS at 02:25

## 2020-08-25 RX ADMIN — LEVETIRACETAM 1000 MG: 500 TABLET ORAL at 09:31

## 2020-08-25 RX ADMIN — MULTIPLE VITAMINS W/ MINERALS TAB 1 TABLET: TAB at 09:31

## 2020-08-25 RX ADMIN — DOCUSATE SODIUM 100 MG: 100 CAPSULE, LIQUID FILLED ORAL at 18:14

## 2020-08-25 RX ADMIN — HYDROMORPHONE HYDROCHLORIDE 1 MG: 1 INJECTION, SOLUTION INTRAMUSCULAR; INTRAVENOUS; SUBCUTANEOUS at 14:23

## 2020-08-25 RX ADMIN — HYDROMORPHONE HYDROCHLORIDE 1 MG: 1 INJECTION, SOLUTION INTRAMUSCULAR; INTRAVENOUS; SUBCUTANEOUS at 10:32

## 2020-08-25 RX ADMIN — FOLIC ACID 1 MG: 1 TABLET ORAL at 09:31

## 2020-08-25 RX ADMIN — POTASSIUM CHLORIDE AND SODIUM CHLORIDE 75 ML/HR: 900; 150 INJECTION, SOLUTION INTRAVENOUS at 21:12

## 2020-08-25 RX ADMIN — THIAMINE HCL TAB 100 MG 100 MG: 100 TAB at 09:31

## 2020-08-25 RX ADMIN — LEVETIRACETAM 1000 MG: 500 TABLET ORAL at 21:10

## 2020-08-25 RX ADMIN — HYDROMORPHONE HYDROCHLORIDE 1 MG: 1 INJECTION, SOLUTION INTRAMUSCULAR; INTRAVENOUS; SUBCUTANEOUS at 22:24

## 2020-08-25 RX ADMIN — Medication 100 MCG: at 09:31

## 2020-08-25 RX ADMIN — HEPARIN SODIUM 5000 UNITS: 5000 INJECTION INTRAVENOUS; SUBCUTANEOUS at 14:23

## 2020-08-25 RX ADMIN — HYDROMORPHONE HYDROCHLORIDE 1 MG: 1 INJECTION, SOLUTION INTRAMUSCULAR; INTRAVENOUS; SUBCUTANEOUS at 18:14

## 2020-08-25 RX ADMIN — HEPARIN SODIUM 5000 UNITS: 5000 INJECTION INTRAVENOUS; SUBCUTANEOUS at 21:10

## 2020-08-25 NOTE — ASSESSMENT & PLAN NOTE
· Likely secondary to alcohol abuse  · Avoid all hepatotoxic agents  · Follow the LFT's (liver function tests)  · Outpatient follow-up with Gastroenterology    Results for Hans Cristina (MRN [id number]) as of 8/25/2020 13:13   Ref   Range 8/24/2020 05:10   HEPATITIS A IGM ANTIBODY Latest Ref Range: Non-reactive, Equivocal-Suggest Recollect  Non-reactive   HEPATITIS B SURFACE ANTIGEN Latest Ref Range: Non-reactive, NonReactive - Confirmed  Non-reactive   HEPATITIS B CORE IGM ANTIBODY Latest Ref Range: Non-reactive  Non-reactive   HEPATITIS C ANTIBODY Latest Ref Range: Non-reactive  Non-reactive

## 2020-08-25 NOTE — ASSESSMENT & PLAN NOTE
· Observed on CT scan    Results for Elvi Martin (MRN [id number]) as of 8/25/2020 13:13   Ref   Range 8/22/2020 09:50   Color, UA Unknown Yellow   Clarity, UA Unknown Clear   SL AMB SPECIFIC GRAVITY_URINE Latest Ref Range: 1 003 - 1 030  1 015   Glucose, UA Latest Ref Range: Negative mg/dl Negative   Ketones, UA Latest Ref Range: Negative mg/dl 15 (1+) (A)   Blood, UA Latest Ref Range: Negative  Negative   Nitrite, UA Latest Ref Range: Negative  Negative   Leukocytes, UA Latest Ref Range: Negative  Negative   pH, UA Latest Ref Range: 4 5, 5 0, 5 5, 6 0, 6 5, 7 0, 7 5, 8 0  7 5   POCT URINE PROTEIN Latest Ref Range: Negative mg/dl Negative   Bilirubin, UA Latest Ref Range: Negative  Negative   SL AMB POCT UROBILINOGEN Latest Ref Range: 0 2, 1 0 E U /dl E U /dl 1 0   RBC, UA Latest Ref Range: None Seen, 0-5 /hpf None Seen   WBC, UA Latest Ref Range: None Seen, 0-5, 5-55, 5-65 /hpf None Seen   Bacteria, UA Latest Ref Range: None Seen, Occasional /hpf None Seen     08/22/2020 0950  08/23/2020 2255  Urine culture [[id number]]    Urine, Clean Catch     Final result  Component  Value    Urine Culture  No Growth <1000 cfu/mL

## 2020-08-25 NOTE — ASSESSMENT & PLAN NOTE
· The CIWA protocol has been discontinued    · Continue thiamine 100 mg PO Qdaily, folic acid 1 mg PO Qdaily, and a daily PO multivitamin  · Pt refused alcohol rehab

## 2020-08-25 NOTE — PROGRESS NOTES
Progress Note - Jazzmine Schmid 1965, 48 y o  male MRN: [id number]    Unit/Bed#: 426-01 Encounter: [id number]    Primary Care Provider: Tabatha Nava PA-C   Date and time admitted to hospital: 8/20/2020  5:22 PM        * Pancreatitis, alcoholic, acute  Assessment & Plan  · Continue a surgical soft/lite diet/low fat diet  · Continue NSS IV fluids with 20 meQ KCl at 75 ml/hr  · Utilize PRN IV dilaudid and PRN IV zofran    Hypomagnesemia  Assessment & Plan  · Give magnesium sulfate 2 grams IV x 1 dose  · Follow the magnesium level    Alcohol dependence ([facility] [address] )  Assessment & Plan  · The CIWA protocol has been discontinued  · Continue thiamine 100 mg PO Qdaily, folic acid 1 mg PO Qdaily, and a daily PO multivitamin  · Pt refused alcohol rehab    Left anterior fascicular block  Assessment & Plan  · Outpatient Cardiology evaluation    Lesion of spleen  Assessment & Plan  CT scan of the abdomen/pelvis (08/20/2020):     · SPLEEN:  Calcified granulomata are noted in the spleen  No suspicious splenic mass  Outpatient follow-up with PCP in regards to this matter    Transaminitis  Assessment & Plan  · Likely secondary to alcohol abuse  · Avoid all hepatotoxic agents  · Follow the LFT's (liver function tests)  · Outpatient follow-up with Gastroenterology    Results for Clarice Pabon (MRN [id number]) as of 8/25/2020 13:13   Ref   Range 8/24/2020 05:10   HEPATITIS A IGM ANTIBODY Latest Ref Range: Non-reactive, Equivocal-Suggest Recollect  Non-reactive   HEPATITIS B SURFACE ANTIGEN Latest Ref Range: Non-reactive, NonReactive - Confirmed  Non-reactive   HEPATITIS B CORE IGM ANTIBODY Latest Ref Range: Non-reactive  Non-reactive   HEPATITIS C ANTIBODY Latest Ref Range: Non-reactive  Non-reactive       Constipation  Assessment & Plan  · Continue colace 100 mg PO BID  · Utilize miralax 17 grams PO Qdaily PRN constipation    Tobacco use  Assessment & Plan  · Nicotine patch  · Smoking cessation counseling    Pancytopenia [facility])  Assessment & Plan  · Likely secondary to alcohol abuse  · Follow the CBC  · Outpatient Hematology/Oncology evaluation    Pancreatic mass  Assessment & Plan  · Check a CA 19-9 level, which is pending at this time  · Needs an endoscopic ultrasound completed at some point to rule-out malignancy  · Consult Gastroenterology    MRI of the abdomen (08/05/2020): IMPRESSION:     20 mm cystic lesion in the uncinate process of the pancreas shows a short interval increase in size and complexity when compared to July  Relatively rapid evolution probably favors postinflammatory pseudocyst   However, apparent nonenhancing mural nodularity is a worrisome feature warranting gastroenterology referral for endoscopic ultrasound  No high risk features  Celiac artery stenosis (HCC)  Assessment & Plan  · Outpatient Vascular Surgery evaluation    Traore esophagus  Assessment & Plan  · Continue PPI treatment  · Outpatient surveillance imaging with Gastroenterology    Thrombocytopenia ([facility] [address] )  Assessment & Plan  · Likely secondary to alcohol abuse  · Monitor for any signs of bleeding  · Follow the platelet count    Hx of seizure disorder  Assessment & Plan  · Continue PO keppra  · Outpatient follow-up with Neurology    Hypokalemia  Assessment & Plan  · Improved with potassium chloride supplementation treatment  · Follow the potassium level    Hypophosphatemia  Assessment & Plan  · Resolved with phosphorus supplementation treatment  · Follow the phosphorus level    Bladder wall thickening  Assessment & Plan  · Observed on CT scan    Results for Anuj Rider (MRN [id number]) as of 8/25/2020 13:13   Ref   Range 8/22/2020 09:50   Color, UA Unknown Yellow   Clarity, UA Unknown Clear   SL AMB SPECIFIC GRAVITY_URINE Latest Ref Range: 1 003 - 1 030  1 015   Glucose, UA Latest Ref Range: Negative mg/dl Negative   Ketones, UA Latest Ref Range: Negative mg/dl 15 (1+) (A)   Blood, UA Latest Ref Range: Negative  Negative   Nitrite, UA Latest Ref Range: Negative  Negative   Leukocytes, UA Latest Ref Range: Negative  Negative   pH, UA Latest Ref Range: 4 5, 5 0, 5 5, 6 0, 6 5, 7 0, 7 5, 8 0  7 5   POCT URINE PROTEIN Latest Ref Range: Negative mg/dl Negative   Bilirubin, UA Latest Ref Range: Negative  Negative   SL AMB POCT UROBILINOGEN Latest Ref Range: 0 2, 1 0 E U /dl E U /dl 1 0   RBC, UA Latest Ref Range: None Seen, 0-5 /hpf None Seen   WBC, UA Latest Ref Range: None Seen, 0-5, 5-55, 5-65 /hpf None Seen   Bacteria, UA Latest Ref Range: None Seen, Occasional /hpf None Seen     08/22/2020 0950  08/23/2020 3766  Urine culture [[id number]]    Urine, Clean Catch     Final result  Component  Value    Urine Culture  No Growth <1000 cfu/mL             Vitamin D deficiency  Assessment & Plan  · Utilize cholecalciferol 1000 I  U  PO Qdaily, which should be continued upon discharge  · Recheck a vitamin D 25-OH level in 1-2 months with his PCP  Outpatient follow-up with PCP in regards to this matter    Hepatic steatosis  Assessment & Plan  · Likely secondary to alcohol abuse  · Alcohol cessation counseling was given to the patient  · Lifestyle modifications are recommended including weight loss, improving his diet, and increasing his amount of activity  · Avoid all hepatotoxic agents  · Follow the LFT's (liver function tests)  · Outpatient surveillance imaging and follow-up with Gastroenterology    Essential hypertension  Assessment & Plan  · Continue PO lisinopril for now  · Utilize PRN IV labetalol  · Follow the blood pressure trend      VTE Pharmacologic Prophylaxis:   Pharmacologic: Heparin  Mechanical VTE Prophylaxis in Place: Yes    Patient Centered Rounds: I have performed bedside rounds with nursing staff today  Time Spent for Care: 30 minutes  More than 50% of total time spent on counseling and coordination of care as described above      Current Length of Stay: 4 day(s)    Current Patient Status: Inpatient   Certification Statement: The patient will continue to require additional inpatient hospital stay due to the need for continuous IV fluids and with the patient requiring IV dilaudid to achieve adequate pain control  Code Status: Level 1 - Full Code      Subjective: The patient was seen and examined  The patient continues to complain of severe epigastric abdominal pain  The nausea and vomiting have improved  Objective:     Vitals:   Temp (24hrs), Av 6 °F (37 °C), Min:98 2 °F (36 8 °C), Max:99 °F (37 2 °C)    Temp:  [98 2 °F (36 8 °C)-99 °F (37 2 °C)] 98 2 °F (36 8 °C)  HR:  [68-78] 78  Resp:  [16] 16  BP: (115-142)/(84-89) 115/84  SpO2:  [98 %-100 %] 98 %  Body mass index is 20 53 kg/m²  Input and Output Summary (last 24 hours):        Intake/Output Summary (Last 24 hours) at 2020 1320  Last data filed at 2020 0900  Gross per 24 hour   Intake 3936 67 ml   Output    Net 3936 67 ml       Physical Exam:     Physical Exam  General:  NAD, follows commands  HEENT:  NC/AT, mucous membranes moist  Neck:  Supple, No JVP elevation  CV:  + S1, + S2, RRR  Pulm:  Lung fields are CTA bilaterally  Abd:  Soft, Epigastric abdominal pain with palpation, Non-distended  Ext:  No clubbing/cyanosis/edema  Skin:  No rashes  Neuro:  Awake, alert, oriented  Psych:  Normal mood and affect      Additional Data:    Labs:    Results from last 7 days   Lab Units 20  0510   WBC Thousand/uL 3 72*   HEMOGLOBIN g/dL 9 6*   HEMATOCRIT % 32 2*   PLATELETS Thousands/uL 114*   NEUTROS PCT % 51   LYMPHS PCT % 29   MONOS PCT % 17*   EOS PCT % 2     Results from last 7 days   Lab Units 20  0510   SODIUM mmol/L 131*   POTASSIUM mmol/L 3 7   CHLORIDE mmol/L 96*   CO2 mmol/L 29   BUN mg/dL 2*   CREATININE mg/dL 0 57*   ANION GAP mmol/L 6   CALCIUM mg/dL 8 6   ALBUMIN g/dL 3 4*   TOTAL BILIRUBIN mg/dL 0 90   ALK PHOS U/L 124*   ALT U/L 35   AST U/L 58*   GLUCOSE RANDOM mg/dL 109     Results from last 7 days   Lab Units 20  0543   INR  1 18 Results from last 7 days   Lab Units 08/25/20  0518 08/24/20  0510 08/23/20  0608 08/20/20  1736   LACTIC ACID mmol/L  --   --  1 3 1 9   PROCALCITONIN ng/ml <0 05 <0 05 <0 05  --            * I Have Reviewed All Lab Data Listed Above  * Additional Pertinent Lab Tests Reviewed:  [facility]      Recent Cultures (last 7 days):     Results from last 7 days   Lab Units 08/22/20  0950   URINE CULTURE  No Growth <1000 cfu/mL       Last 24 Hours Medication List:   Current Facility-Administered Medications   Medication Dose Route Frequency Provider Last Rate    albuterol  2 puff Inhalation Q4H PRN Melinda Foster MD      albuterol  2 5 mg Nebulization Q6H PRN Melinda Foster MD      cholecalciferol  1,000 Units Oral Daily Gloria Leroy, DO      cyanocobalamin  100 mcg Oral Daily Melinda Foster MD      docusate sodium  100 mg Oral BID Gloria Leroy, DO      folic acid  1 mg Oral Daily Melinda Foster MD      heparin (porcine)  5,000 Units Subcutaneous Q8H [address] Juanis Cason MD      HYDROmorphone  0 5 mg Intravenous Q3H PRN Gloria Leroy, DO      Or    HYDROmorphone  1 mg Intravenous Q3H PRN Gloria Leroy, DO      Labetalol HCl  10 mg Intravenous Q4H PRN Gloria Leroy, DO      levETIRAcetam  1,000 mg Oral Q12H [address] [facility], DO      lisinopril  10 mg Oral Daily Gloria Leroy, DO      multivitamin-minerals  1 tablet Oral Daily Melinda Foster MD      nicotine  1 patch Transdermal Daily Melinda Foster MD      ondansetron  4 mg Intravenous Q4H PRN Melinda Foster MD      pantoprazole  40 mg Intravenous Q24H [address] Melinda Foster MD      polyethylene glycol  17 g Oral Daily PRN Gloria Leroy,       sodium chloride (PF)  3 mL Intravenous Q1H PRN Saw Courtney, DO      sodium chloride 0 9 % with KCl 20 mEq/L  75 mL/hr Intravenous Continuous Gloria Leroy,  mL/hr (08/25/20 0601)    thiamine  100 mg Oral Daily Hiram Byrne MD          Today, Patient Was Seen By: Rojas Colvin DO    ** Please Note: Dictation voice to text software may have been used in the creation of this document   **

## 2020-08-25 NOTE — PLAN OF CARE
Problem: PAIN - ADULT  Goal: Verbalizes/displays adequate comfort level or baseline comfort level  Description: Interventions:  - Encourage patient to monitor pain and request assistance  - Assess pain using appropriate pain scale  - Administer analgesics based on type and severity of pain and evaluate response  - Implement non-pharmacological measures as appropriate and evaluate response  - Consider cultural and social influences on pain and pain management  - Notify physician/advanced practitioner if interventions unsuccessful or patient reports new pain  Outcome: Progressing     Problem: INFECTION - ADULT  Goal: Absence or prevention of progression during hospitalization  Description: INTERVENTIONS:  - Assess and monitor for signs and symptoms of infection  - Monitor lab/diagnostic results  - Monitor all insertion sites, i e  indwelling lines, tubes, and drains  - Monitor endotracheal if appropriate and nasal secretions for changes in amount and color  - [place] appropriate cooling/warming therapies per order  - Administer medications as ordered  - Instruct and encourage patient and family to use good hand hygiene technique  - Identify and instruct in appropriate isolation precautions for identified infection/condition  Outcome: Progressing     Problem: SAFETY ADULT  Goal: Patient will remain free of falls  Description: INTERVENTIONS:  - Assess patient frequently for physical needs  -  Identify cognitive and physical deficits and behaviors that affect risk of falls    -  [place] fall precautions as indicated by assessment   - Educate patient/family on patient safety including physical limitations  - Instruct patient to call for assistance with activity based on assessment  - Modify environment to reduce risk of injury  - Consider OT/PT consult to assist with strengthening/mobility  Outcome: Progressing  Goal: Maintain or return to baseline ADL function  Description: INTERVENTIONS:  -  Assess patient's ability to carry out ADLs; assess patient's baseline for ADL function and identify physical deficits which impact ability to perform ADLs (bathing, care of mouth/teeth, toileting, grooming, dressing, etc )  - Assess/evaluate cause of self-care deficits   - Assess range of motion  - Assess patient's mobility; develop plan if impaired  - Assess patient's need for assistive devices and provide as appropriate  - Encourage maximum independence but intervene and supervise when necessary  - Involve family in performance of ADLs  - Assess for home care needs following discharge   - Consider OT consult to assist with ADL evaluation and planning for discharge  - Provide patient education as appropriate  Outcome: Progressing  Goal: Maintain or return mobility status to optimal level  Description: INTERVENTIONS:  - Assess patient's baseline mobility status (ambulation, transfers, stairs, etc )    - Identify cognitive and physical deficits and behaviors that affect mobility  - Identify mobility aids required to assist with transfers and/or ambulation (gait belt, sit-to-stand, lift, walker, cane, etc )  - [place] fall precautions as indicated by assessment  - Record patient progress and toleration of activity level on Mobility SBAR; progress patient to next Phase/Stage  - Instruct patient to call for assistance with activity based on assessment  - Consider rehabilitation consult to assist with strengthening/weightbearing, etc   Outcome: Progressing     Problem: DISCHARGE PLANNING  Goal: Discharge to home or other facility with appropriate resources  Description: INTERVENTIONS:  - Identify barriers to discharge w/patient and caregiver  - Arrange for needed discharge resources and transportation as appropriate  - Identify discharge learning needs (meds, wound care, etc )  - Arrange for interpretive services to assist at discharge as needed  - Refer to Case Management Department for coordinating discharge planning if the patient needs post-hospital services based on physician/advanced practitioner order or complex needs related to functional status, cognitive ability, or social support system  Outcome: Progressing     Problem: Knowledge Deficit  Goal: Patient/family/caregiver demonstrates understanding of disease process, treatment plan, medications, and discharge instructions  Description: Complete learning assessment and assess knowledge base    Interventions:  - Provide teaching at level of understanding  - Provide teaching via preferred learning methods  Outcome: Progressing     Problem: GASTROINTESTINAL - ADULT  Goal: Minimal or absence of nausea and/or vomiting  Description: INTERVENTIONS:  - Administer IV fluids if ordered to ensure adequate hydration  - Maintain NPO status until nausea and vomiting are resolved  - Nasogastric tube if ordered  - Administer ordered antiemetic medications as needed  - Provide nonpharmacologic comfort measures as appropriate  - Advance diet as tolerated, if ordered  - Consider nutrition services referral to assist patient with adequate nutrition and appropriate food choices  Outcome: Progressing     Problem: METABOLIC, FLUID AND ELECTROLYTES - ADULT  Goal: Electrolytes maintained within normal limits  Description: INTERVENTIONS:  - Monitor labs and assess patient for signs and symptoms of electrolyte imbalances  - Administer electrolyte replacement as ordered  - Monitor response to electrolyte replacements, including repeat lab results as appropriate  - Instruct patient on fluid and nutrition as appropriate  Outcome: Progressing

## 2020-08-25 NOTE — ASSESSMENT & PLAN NOTE
· Utilize cholecalciferol 1000 I  U  PO Qdaily, which should be continued upon discharge  · Recheck a vitamin D 25-OH level in 1-2 months with his PCP  Outpatient follow-up with PCP in regards to this matter

## 2020-08-25 NOTE — ASSESSMENT & PLAN NOTE
· Check a CA 19-9 level, which is pending at this time  · Needs an endoscopic ultrasound completed at some point to rule-out malignancy  · Consult Gastroenterology    MRI of the abdomen (08/05/2020): IMPRESSION:     20 mm cystic lesion in the uncinate process of the pancreas shows a short interval increase in size and complexity when compared to July  Relatively rapid evolution probably favors postinflammatory pseudocyst   However, apparent nonenhancing mural nodularity is a worrisome feature warranting gastroenterology referral for endoscopic ultrasound  No high risk features

## 2020-08-25 NOTE — ASSESSMENT & PLAN NOTE
· Continue a surgical soft/lite diet/low fat diet  · Continue NSS IV fluids with 20 meQ KCl at 75 ml/hr  · Utilize PRN IV dilaudid and PRN IV zofran

## 2020-08-25 NOTE — CONSULTS
Consultation - [facility]) [facility]  Martir Toro 48 y o  male MRN: [id number]  Unit/Bed#: 590-29 Encounter: [id number]        ASSESSMENT/PLAN:   Pancreatitis  Pancreatic cyst    Patient was admitted with epigastric/chest pain, found to have an elevated lipase as well as acute pancreatitis on imaging  He was also found to have what is likely a pancreatic pseudocyst   Is recommended that he undergo endoscopic ultrasound for further evaluation  He denies any previous episodes of pancreatitis  This is likely secondary to alcohol as he does drink on a daily basis  Also long-time smoker  Lipid panel is pending  He is status post cholecystectomy  He is tolerating a diet but continues to have pain  Would recommend continuing with supportive care and we will arrange outpatient follow-up for endoscopic ultrasound   -diet as tolerated  -pain medication p r n   -outpatient EUS  -alcohol and tobacco cessation      Consults    Reason for Consult / Principal Problem: Pancreatitis, alcoholic, acute    HPI: Martir Toro is a 48y o  year old male with a PMH of Traore's esophagus, GERD, iron deficiency anemia, COPD, who presented epigastric/chest pain  On admission he was found have an elevated lipase 539  CT showed acute pancreatitis, pancreatic cysts, diffuse hepatic steatosis, severe stenosis of the celiac axis  Over the last few days he was treated with IV hydration and pain medication  His diet was increased to surgical soft but he continues to require pain medication  Pancreatitis was felt secondary to alcohol use  He does drink approximately 1 6 pack per day  He is a smoker  Lipid panel is pending  He is status post cholecystectomy  Review of Systems: as per HPI  Review of Systems   All other systems reviewed and are negative        Historical Information   Past Medical History:   Diagnosis Date    Alcohol abuse     Traore esophagus     Bowel obstruction (HCC)     Bowel perforation [facility])     Cardiac disease     Continuous chronic alcoholism ([facility] ) 10/5/2017    COPD (chronic obstructive pulmonary disease) (HCC)     History of shoulder surgery     Right shoulder    History of transfusion     Hx of cervical spine surgery     Hypertension     Incisional hernia 10/8/2017    MI, old     Mitral regurgitation     Psychiatric disorder     Seizures ([facility] [address] )      Past Surgical History:   Procedure Laterality Date    APPENDECTOMY      BACK SURGERY      CHOLECYSTECTOMY      ESOPHAGOGASTRODUODENOSCOPY N/A 11/28/2016    Procedure: ESOPHAGOGASTRODUODENOSCOPY (EGD); Surgeon: Rafi Bonilla MD;  Location:  GI LAB;   Service:    [address]      LAPAROTOMY N/A 10/25/2016    Procedure: LAPAROTOMY EXPLORATORY;  Surgeon: oRsalia Rodarte MD;  Location: [facility];  Service:     MOUTH SURGERY      PERCUTANEOUS PINNING FEMORAL NECK FRACTURE      SHOULDER SURGERY Right     SHOULDER SURGERY      SMALL INTESTINE SURGERY      STOMACH SURGERY      bal surgery     Social History   Social History     Substance and Sexual Activity   Alcohol Use Yes    Alcohol/week: 6 0 standard drinks    Types: 6 Cans of beer per week    Frequency: 4 or more times a week    Drinks per session: 5 or 6    Binge frequency: Monthly    Comment: 6 12oz cans a day     Social History     Substance and Sexual Activity   Drug Use No     Social History     Tobacco Use   Smoking Status Current Every Day Smoker    Packs/day: 2 00    Years: 15 00    Pack years: 30 00    Types: Cigarettes   Smokeless Tobacco Never Used     Family History   Problem Relation Age of Onset    Breast cancer Mother     Prostate cancer Father     Skin cancer Brother        Meds/Allergies     Medications Prior to Admission   Medication    albuterol (PROVENTIL HFA,VENTOLIN HFA) 90 mcg/act inhaler    cholecalciferol (VITAMIN D3) 1,000 units tablet    cyanocobalamin (VITAMIN B-12) 100 mcg tablet    folic acid (FOLVITE) 1 mg tablet  gabapentin (NEURONTIN) 100 mg capsule    levETIRAcetam (KEPPRA) 1000 MG tablet    lisinopril (ZESTRIL) 10 mg tablet    magnesium oxide (MAG-OX) 400 mg    Multiple Vitamin (TAB-A-BONI PO)    pantoprazole (PROTONIX) 40 mg tablet    sodium chloride 1 g tablet    thiamine 100 MG tablet    acetaminophen (TYLENOL) 325 mg tablet    amitriptyline (ELAVIL) 25 mg tablet    furosemide (LASIX) 20 mg tablet     Current Facility-Administered Medications   Medication Dose Route Frequency    albuterol (PROVENTIL HFA,VENTOLIN HFA) inhaler 2 puff  2 puff Inhalation Q4H PRN    albuterol inhalation solution 2 5 mg  2 5 mg Nebulization Q6H PRN    cholecalciferol (VITAMIN D3) tablet 1,000 Units  1,000 Units Oral Daily    cyanocobalamin (VITAMIN B-12) tablet 100 mcg  100 mcg Oral Daily    docusate sodium (COLACE) capsule 100 mg  100 mg Oral BID    folic acid (FOLVITE) tablet 1 mg  1 mg Oral Daily    heparin (porcine) subcutaneous injection 5,000 Units  5,000 Units Subcutaneous Q8H [address]    HYDROmorphone (DILAUDID) injection 0 5 mg  0 5 mg Intravenous Q3H PRN    Or    HYDROmorphone (DILAUDID) injection 1 mg  1 mg Intravenous Q3H PRN    Labetalol HCl (NORMODYNE) injection 10 mg  10 mg Intravenous Q4H PRN    levETIRAcetam (KEPPRA) tablet 1,000 mg  1,000 mg Oral Q12H [place]IM[place]    lisinopril (ZESTRIL) tablet 10 mg  10 mg Oral Daily    multivitamin-minerals (CENTRUM) tablet 1 tablet  1 tablet Oral Daily    nicotine (NICODERM CQ) 21 mg/24 hr TD 24 hr patch 1 patch  1 patch Transdermal Daily    ondansetron (ZOFRAN) injection 4 mg  4 mg Intravenous Q4H PRN    pantoprazole (PROTONIX) injection 40 mg  40 mg Intravenous Q24H KIMBERLY    polyethylene glycol (MIRALAX) packet 17 g  17 g Oral Daily PRN    sodium chloride (PF) 0 9 % injection 3 mL  3 mL Intravenous Q1H PRN    sodium chloride 0 9 % with KCl 20 mEq/L infusion (premix)  75 mL/hr Intravenous Continuous    thiamine (VITAMIN B1) tablet 100 mg  100 mg Oral Daily Allergies   Allergen Reactions    Cholestatin        Objective     Blood pressure 115/84, pulse 78, temperature 98 2 °F (36 8 °C), temperature source Oral, resp  rate 16, height 5' 6" (1 676 m), weight 57 7 kg (127 lb 3 3 oz), SpO2 98 %  Intake/Output Summary (Last 24 hours) at 8/25/2020 1328  Last data filed at 8/25/2020 0900  Gross per 24 hour   Intake 3936 67 ml   Output    Net 3936 67 ml       PHYSICAL EXAM     Physical Exam  Constitutional:       General: He is not in acute distress  Appearance: Normal appearance  He is well-developed  HENT:      Head: Normocephalic and atraumatic  Eyes:      Conjunctiva/sclera: Conjunctivae normal    Neck:      Musculoskeletal: Normal range of motion  Cardiovascular:      Rate and Rhythm: Normal rate and regular rhythm  Pulmonary:      Effort: Pulmonary effort is normal       Breath sounds: Normal breath sounds  Abdominal:      General: Bowel sounds are normal  There is no distension  Palpations: Abdomen is soft  Tenderness: There is abdominal tenderness (Epigastric)  Musculoskeletal: Normal range of motion  Skin:     General: Skin is warm and dry  Neurological:      Mental Status: He is alert and oriented to person, place, and time  Psychiatric:         Mood and Affect: Mood normal          Behavior: Behavior normal          Imaging Studies: I have personally reviewed pertinent reports  CTA chest/ abd/ pelvis:  1  No acute pulmonary embolism      2  Acute pancreatitis      3  Pancreatic head cysts similar in size compared to the prior MRI  While these may represent pseudocysts, there were suspicious features reported on MRI and endoscopic ultrasound was recommended      4   Bladder wall thickening with bilateral ureteral fullness of uncertain etiology, not present previously  No hydronephrosis  Correlation with urinalysis recommended      5    Diffuse hepatic steatosis     6   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 with poststenotic dilatation

## 2020-08-25 NOTE — ASSESSMENT & PLAN NOTE
CT scan of the abdomen/pelvis (08/20/2020):     · SPLEEN:  Calcified granulomata are noted in the spleen  No suspicious splenic mass    Outpatient follow-up with PCP in regards to this matter

## 2020-08-26 LAB
ALBUMIN SERPL BCP-MCNC: 3.2 G/DL (ref 3.5–5)
ALP SERPL-CCNC: 129 U/L (ref 46–116)
ALT SERPL W P-5'-P-CCNC: 38 U/L (ref 12–78)
ANION GAP SERPL CALCULATED.3IONS-SCNC: 5 MMOL/L (ref 4–13)
AST SERPL W P-5'-P-CCNC: 49 U/L (ref 5–45)
BASOPHILS # BLD MANUAL: 0 THOUSAND/UL (ref 0–0.1)
BASOPHILS NFR MAR MANUAL: 0 % (ref 0–1)
BILIRUB SERPL-MCNC: 0.7 MG/DL (ref 0.2–1)
BUN SERPL-MCNC: 3 MG/DL (ref 5–25)
CALCIUM SERPL-MCNC: 9.1 MG/DL (ref 8.3–10.1)
CHLORIDE SERPL-SCNC: 91 MMOL/L (ref 100–108)
CHOLEST SERPL-MCNC: 127 MG/DL (ref 50–200)
CO2 SERPL-SCNC: 30 MMOL/L (ref 21–32)
CREAT SERPL-MCNC: 0.61 MG/DL (ref 0.6–1.3)
EOSINOPHIL # BLD MANUAL: 0.04 THOUSAND/UL (ref 0–0.4)
EOSINOPHIL NFR BLD MANUAL: 1 % (ref 0–6)
ERYTHROCYTE [DISTWIDTH] IN BLOOD BY AUTOMATED COUNT: 17.9 % (ref 11.6–15.1)
GFR SERPL CREATININE-BSD FRML MDRD: 113 ML/MIN/1.73SQ M
GLUCOSE SERPL-MCNC: 108 MG/DL (ref 65–140)
HCT VFR BLD AUTO: 28.2 % (ref 36.5–49.3)
HDLC SERPL-MCNC: 27 MG/DL
HGB BLD-MCNC: 8.7 G/DL (ref 12–17)
HYPERCHROMIA BLD QL SMEAR: PRESENT
LDLC SERPL CALC-MCNC: 90 MG/DL (ref 0–100)
LIPASE SERPL-CCNC: 203 U/L (ref 73–393)
LYMPHOCYTES # BLD AUTO: 1.22 THOUSAND/UL (ref 0.6–4.47)
LYMPHOCYTES # BLD AUTO: 31 % (ref 14–44)
MAGNESIUM SERPL-MCNC: 1.3 MG/DL (ref 1.6–2.6)
MCH RBC QN AUTO: 24.9 PG (ref 26.8–34.3)
MCHC RBC AUTO-ENTMCNC: 30.9 G/DL (ref 31.4–37.4)
MCV RBC AUTO: 81 FL (ref 82–98)
MONOCYTES # BLD AUTO: 0.91 THOUSAND/UL (ref 0–1.22)
MONOCYTES NFR BLD: 23 % (ref 4–12)
NEUTROPHILS # BLD MANUAL: 1.78 THOUSAND/UL (ref 1.85–7.62)
NEUTS SEG NFR BLD AUTO: 45 % (ref 43–75)
NONHDLC SERPL-MCNC: 100 MG/DL
NRBC BLD AUTO-RTO: 0 /100 WBCS
PHOSPHATE SERPL-MCNC: 4.1 MG/DL (ref 2.7–4.5)
PLATELET # BLD AUTO: 121 THOUSANDS/UL (ref 149–390)
PLATELET BLD QL SMEAR: ABNORMAL
PMV BLD AUTO: 10.3 FL (ref 8.9–12.7)
POTASSIUM SERPL-SCNC: 4.1 MMOL/L (ref 3.5–5.3)
PROT SERPL-MCNC: 7.4 G/DL (ref 6.4–8.2)
RBC # BLD AUTO: 3.5 MILLION/UL (ref 3.88–5.62)
ROULEAUX BLD QL SMEAR: PRESENT
SODIUM SERPL-SCNC: 126 MMOL/L (ref 136–145)
TOTAL CELLS COUNTED SPEC: 100
TRIGL SERPL-MCNC: 51 MG/DL
WBC # BLD AUTO: 3.95 THOUSAND/UL (ref 4.31–10.16)

## 2020-08-26 PROCEDURE — 80061 LIPID PANEL: CPT | Performed by: INTERNAL MEDICINE

## 2020-08-26 PROCEDURE — 80053 COMPREHEN METABOLIC PANEL: CPT | Performed by: INTERNAL MEDICINE

## 2020-08-26 PROCEDURE — 84100 ASSAY OF PHOSPHORUS: CPT | Performed by: INTERNAL MEDICINE

## 2020-08-26 PROCEDURE — 85007 BL SMEAR W/DIFF WBC COUNT: CPT | Performed by: INTERNAL MEDICINE

## 2020-08-26 PROCEDURE — C9113 INJ PANTOPRAZOLE SODIUM, VIA: HCPCS | Performed by: HOSPITALIST

## 2020-08-26 PROCEDURE — 83690 ASSAY OF LIPASE: CPT | Performed by: INTERNAL MEDICINE

## 2020-08-26 PROCEDURE — 85027 COMPLETE CBC AUTOMATED: CPT | Performed by: INTERNAL MEDICINE

## 2020-08-26 PROCEDURE — 83735 ASSAY OF MAGNESIUM: CPT | Performed by: INTERNAL MEDICINE

## 2020-08-26 PROCEDURE — 99232 SBSQ HOSP IP/OBS MODERATE 35: CPT | Performed by: INTERNAL MEDICINE

## 2020-08-26 RX ORDER — POLYETHYLENE GLYCOL 3350 17 G/17G
17 POWDER, FOR SOLUTION ORAL DAILY
Status: DISCONTINUED | OUTPATIENT
Start: 2020-08-26 | End: 2020-08-29 | Stop reason: HOSPADM

## 2020-08-26 RX ORDER — MAGNESIUM SULFATE HEPTAHYDRATE 40 MG/ML
2 INJECTION, SOLUTION INTRAVENOUS ONCE
Status: COMPLETED | OUTPATIENT
Start: 2020-08-26 | End: 2020-08-26

## 2020-08-26 RX ORDER — PANTOPRAZOLE SODIUM 40 MG/1
40 TABLET, DELAYED RELEASE ORAL
Status: DISCONTINUED | OUTPATIENT
Start: 2020-08-26 | End: 2020-08-29 | Stop reason: HOSPADM

## 2020-08-26 RX ADMIN — POTASSIUM CHLORIDE AND SODIUM CHLORIDE 75 ML/HR: 900; 150 INJECTION, SOLUTION INTRAVENOUS at 09:24

## 2020-08-26 RX ADMIN — LEVETIRACETAM 1000 MG: 500 TABLET ORAL at 09:15

## 2020-08-26 RX ADMIN — VITAMIN D, TAB 1000IU (100/BT) 1000 UNITS: 25 TAB at 09:16

## 2020-08-26 RX ADMIN — HEPARIN SODIUM 5000 UNITS: 5000 INJECTION INTRAVENOUS; SUBCUTANEOUS at 21:04

## 2020-08-26 RX ADMIN — DOCUSATE SODIUM 100 MG: 100 CAPSULE, LIQUID FILLED ORAL at 09:15

## 2020-08-26 RX ADMIN — FOLIC ACID 1 MG: 1 TABLET ORAL at 09:15

## 2020-08-26 RX ADMIN — MAGNESIUM SULFATE IN WATER 2 G: 40 INJECTION, SOLUTION INTRAVENOUS at 13:52

## 2020-08-26 RX ADMIN — HYDROMORPHONE HYDROCHLORIDE 1 MG: 1 INJECTION, SOLUTION INTRAMUSCULAR; INTRAVENOUS; SUBCUTANEOUS at 01:58

## 2020-08-26 RX ADMIN — MULTIPLE VITAMINS W/ MINERALS TAB 1 TABLET: TAB at 09:15

## 2020-08-26 RX ADMIN — HYDROMORPHONE HYDROCHLORIDE 1 MG: 1 INJECTION, SOLUTION INTRAMUSCULAR; INTRAVENOUS; SUBCUTANEOUS at 06:11

## 2020-08-26 RX ADMIN — LEVETIRACETAM 1000 MG: 500 TABLET ORAL at 21:04

## 2020-08-26 RX ADMIN — HYDROMORPHONE HYDROCHLORIDE 1 MG: 1 INJECTION, SOLUTION INTRAMUSCULAR; INTRAVENOUS; SUBCUTANEOUS at 21:03

## 2020-08-26 RX ADMIN — Medication 100 MCG: at 09:15

## 2020-08-26 RX ADMIN — HYDROMORPHONE HYDROCHLORIDE 1 MG: 1 INJECTION, SOLUTION INTRAMUSCULAR; INTRAVENOUS; SUBCUTANEOUS at 13:47

## 2020-08-26 RX ADMIN — LISINOPRIL 10 MG: 10 TABLET ORAL at 09:16

## 2020-08-26 RX ADMIN — THIAMINE HCL TAB 100 MG 100 MG: 100 TAB at 09:16

## 2020-08-26 RX ADMIN — PANTOPRAZOLE SODIUM 40 MG: 40 INJECTION, POWDER, FOR SOLUTION INTRAVENOUS at 09:19

## 2020-08-26 RX ADMIN — HYDROMORPHONE HYDROCHLORIDE 1 MG: 1 INJECTION, SOLUTION INTRAMUSCULAR; INTRAVENOUS; SUBCUTANEOUS at 09:32

## 2020-08-26 RX ADMIN — HEPARIN SODIUM 5000 UNITS: 5000 INJECTION INTRAVENOUS; SUBCUTANEOUS at 09:16

## 2020-08-26 RX ADMIN — HYDROMORPHONE HYDROCHLORIDE 1 MG: 1 INJECTION, SOLUTION INTRAMUSCULAR; INTRAVENOUS; SUBCUTANEOUS at 17:40

## 2020-08-26 RX ADMIN — DOCUSATE SODIUM 100 MG: 100 CAPSULE, LIQUID FILLED ORAL at 17:28

## 2020-08-26 RX ADMIN — POLYETHYLENE GLYCOL 3350 17 G: 17 POWDER, FOR SOLUTION ORAL at 09:32

## 2020-08-26 NOTE — CASE MANAGEMENT
As per physician patient is not ready for discharge   Pt continues to c/o pain  Pt is tolerating PO   Pt might need EUS  Pt will return home with his brother

## 2020-08-26 NOTE — PLAN OF CARE
Problem: PAIN - ADULT  Goal: Verbalizes/displays adequate comfort level or baseline comfort level  Description: Interventions:  - Encourage patient to monitor pain and request assistance  - Assess pain using appropriate pain scale  - Administer analgesics based on type and severity of pain and evaluate response  - Implement non-pharmacological measures as appropriate and evaluate response  - Consider cultural and social influences on pain and pain management  - Notify physician/advanced practitioner if interventions unsuccessful or patient reports new pain  Outcome: Progressing     Problem: INFECTION - ADULT  Goal: Absence or prevention of progression during hospitalization  Description: INTERVENTIONS:  - Assess and monitor for signs and symptoms of infection  - Monitor lab/diagnostic results  - Monitor all insertion sites, i e  indwelling lines, tubes, and drains  - Monitor endotracheal if appropriate and nasal secretions for changes in amount and color  - [place] appropriate cooling/warming therapies per order  - Administer medications as ordered  - Instruct and encourage patient and family to use good hand hygiene technique  - Identify and instruct in appropriate isolation precautions for identified infection/condition  Outcome: Progressing     Problem: SAFETY ADULT  Goal: Patient will remain free of falls  Description: INTERVENTIONS:  - Assess patient frequently for physical needs  -  Identify cognitive and physical deficits and behaviors that affect risk of falls    -  [place] fall precautions as indicated by assessment   - Educate patient/family on patient safety including physical limitations  - Instruct patient to call for assistance with activity based on assessment  - Modify environment to reduce risk of injury  - Consider OT/PT consult to assist with strengthening/mobility  Outcome: Progressing  Goal: Maintain or return to baseline ADL function  Description: INTERVENTIONS:  -  Assess patient's ability to carry out ADLs; assess patient's baseline for ADL function and identify physical deficits which impact ability to perform ADLs (bathing, care of mouth/teeth, toileting, grooming, dressing, etc )  - Assess/evaluate cause of self-care deficits   - Assess range of motion  - Assess patient's mobility; develop plan if impaired  - Assess patient's need for assistive devices and provide as appropriate  - Encourage maximum independence but intervene and supervise when necessary  - Involve family in performance of ADLs  - Assess for home care needs following discharge   - Consider OT consult to assist with ADL evaluation and planning for discharge  - Provide patient education as appropriate  Outcome: Progressing  Goal: Maintain or return mobility status to optimal level  Description: INTERVENTIONS:  - Assess patient's baseline mobility status (ambulation, transfers, stairs, etc )    - Identify cognitive and physical deficits and behaviors that affect mobility  - Identify mobility aids required to assist with transfers and/or ambulation (gait belt, sit-to-stand, lift, walker, cane, etc )  - [place] fall precautions as indicated by assessment  - Record patient progress and toleration of activity level on Mobility SBAR; progress patient to next Phase/Stage  - Instruct patient to call for assistance with activity based on assessment  - Consider rehabilitation consult to assist with strengthening/weightbearing, etc   Outcome: Progressing     Problem: DISCHARGE PLANNING  Goal: Discharge to home or other facility with appropriate resources  Description: INTERVENTIONS:  - Identify barriers to discharge w/patient and caregiver  - Arrange for needed discharge resources and transportation as appropriate  - Identify discharge learning needs (meds, wound care, etc )  - Arrange for interpretive services to assist at discharge as needed  - Refer to Case Management Department for coordinating discharge planning if the patient needs post-hospital services based on physician/advanced practitioner order or complex needs related to functional status, cognitive ability, or social support system  Outcome: Progressing     Problem: Knowledge Deficit  Goal: Patient/family/caregiver demonstrates understanding of disease process, treatment plan, medications, and discharge instructions  Description: Complete learning assessment and assess knowledge base    Interventions:  - Provide teaching at level of understanding  - Provide teaching via preferred learning methods  Outcome: Progressing     Problem: GASTROINTESTINAL - ADULT  Goal: Minimal or absence of nausea and/or vomiting  Description: INTERVENTIONS:  - Administer IV fluids if ordered to ensure adequate hydration  - Maintain NPO status until nausea and vomiting are resolved  - Nasogastric tube if ordered  - Administer ordered antiemetic medications as needed  - Provide nonpharmacologic comfort measures as appropriate  - Advance diet as tolerated, if ordered  - Consider nutrition services referral to assist patient with adequate nutrition and appropriate food choices  Outcome: Progressing     Problem: METABOLIC, FLUID AND ELECTROLYTES - ADULT  Goal: Electrolytes maintained within normal limits  Description: INTERVENTIONS:  - Monitor labs and assess patient for signs and symptoms of electrolyte imbalances  - Administer electrolyte replacement as ordered  - Monitor response to electrolyte replacements, including repeat lab results as appropriate  - Instruct patient on fluid and nutrition as appropriate  Outcome: Progressing

## 2020-08-26 NOTE — ASSESSMENT & PLAN NOTE
· Observed on CT scan    Results for Ty Freed (MRN [id number]) as of 8/25/2020 13:13   Ref   Range 8/22/2020 09:50   Color, UA Unknown Yellow   Clarity, UA Unknown Clear   SL AMB SPECIFIC GRAVITY_URINE Latest Ref Range: 1 003 - 1 030  1 015   Glucose, UA Latest Ref Range: Negative mg/dl Negative   Ketones, UA Latest Ref Range: Negative mg/dl 15 (1+) (A)   Blood, UA Latest Ref Range: Negative  Negative   Nitrite, UA Latest Ref Range: Negative  Negative   Leukocytes, UA Latest Ref Range: Negative  Negative   pH, UA Latest Ref Range: 4 5, 5 0, 5 5, 6 0, 6 5, 7 0, 7 5, 8 0  7 5   POCT URINE PROTEIN Latest Ref Range: Negative mg/dl Negative   Bilirubin, UA Latest Ref Range: Negative  Negative   SL AMB POCT UROBILINOGEN Latest Ref Range: 0 2, 1 0 E U /dl E U /dl 1 0   RBC, UA Latest Ref Range: None Seen, 0-5 /hpf None Seen   WBC, UA Latest Ref Range: None Seen, 0-5, 5-55, 5-65 /hpf None Seen   Bacteria, UA Latest Ref Range: None Seen, Occasional /hpf None Seen     08/22/2020 0950  08/23/2020 2255  Urine culture [[id number]]    Urine, Clean Catch     Final result  Component  Value    Urine Culture  No Growth <1000 cfu/mL

## 2020-08-26 NOTE — ASSESSMENT & PLAN NOTE
· The patient was seen in consultation by Gastroenterology  · Needs an endoscopic ultrasound completed at some point to rule-out malignancy    Results for Lucina Wright (MRN [id number]) as of 8/26/2020 13:13   Ref  Range 8/23/2020 06:08   CA 19-9 Latest Ref Range: 0 - 35 U/mL 1     MRI of the abdomen (08/05/2020): IMPRESSION:     20 mm cystic lesion in the uncinate process of the pancreas shows a short interval increase in size and complexity when compared to July  Relatively rapid evolution probably favors postinflammatory pseudocyst   However, apparent nonenhancing mural nodularity is a worrisome feature warranting gastroenterology referral for endoscopic ultrasound  No high risk features

## 2020-08-26 NOTE — ASSESSMENT & PLAN NOTE
· Continue a surgical soft/lite diet/low fat diet  · Continue NSS IV fluids with 20 meQ KCl at 50 ml/hr  · Utilize PRN IV dilaudid and PRN IV zofran

## 2020-08-26 NOTE — ASSESSMENT & PLAN NOTE
· Likely secondary to alcohol abuse  · Avoid all hepatotoxic agents  · Follow the LFT's (liver function tests)  · Outpatient follow-up with Gastroenterology    Results for Lucina Wright (MRN [id number]) as of 8/25/2020 13:13   Ref   Range 8/24/2020 05:10   HEPATITIS A IGM ANTIBODY Latest Ref Range: Non-reactive, Equivocal-Suggest Recollect  Non-reactive   HEPATITIS B SURFACE ANTIGEN Latest Ref Range: Non-reactive, NonReactive - Confirmed  Non-reactive   HEPATITIS B CORE IGM ANTIBODY Latest Ref Range: Non-reactive  Non-reactive   HEPATITIS C ANTIBODY Latest Ref Range: Non-reactive  Non-reactive

## 2020-08-26 NOTE — PROGRESS NOTES
Progress Note - Swetha Hernandez 48 y o  male MRN: [id number]    Unit/Bed#: 905-35 Encounter: [id number]         Assessment/ Plan:  Pancreatitis  Pancreatic cyst     Patient was admitted with epigastric/chest pain, found to have an elevated lipase as well as acute pancreatitis on imaging  He was also found to have what is likely a pancreatic pseudocyst   Is recommended that he undergo endoscopic ultrasound for further evaluation  He denies any previous episodes of pancreatitis  This is likely secondary to alcohol as he does drink on a daily basis  Also long-time smoker  Triglycerides WNL's  He is status post cholecystectomy  He is tolerating a diet but continues to have pain  Would recommend continuing with supportive care and we will arrange outpatient follow-up for endoscopic ultrasound   -diet as tolerated  -pain medication p r n   -outpatient EUS  -alcohol and tobacco cessation      Subjective:   Pt still with epigastric/ CP  Tolerating diet  No BM in several days  Objective:     Vitals: Blood pressure 134/80, pulse 78, temperature 99 °F (37 2 °C), temperature source Oral, resp  rate 16, height 5' 6" (1 676 m), weight 57 7 kg (127 lb 3 3 oz), SpO2 98 %  ,Body mass index is 20 53 kg/m²  Physical Exam: General appearance: alert and oriented, in no acute distress  Lungs: clear to auscultation bilaterally  Heart: regular rate and rhythm  Abdomen: mildly TTP  Skin: Skin color, texture, turgor normal  No rashes or lesions     Invasive Devices     Peripheral Intravenous Line            Peripheral IV 08/25/20 Left Antecubital less than 1 day                Lab, Imaging and other studies: I have personally reviewed pertinent reports       CBC:   Lab Results   Component Value Date    WBC 3 95 (L) 08/26/2020    HGB 8 7 (L) 08/26/2020    HCT 28 2 (L) 08/26/2020    MCV 81 (L) 08/26/2020     (L) 08/26/2020    MCH 24 9 (L) 08/26/2020    MCHC 30 9 (L) 08/26/2020    RDW 17 9 (H) 08/26/2020    MPV 10 3 08/26/2020    NRBC 0 08/26/2020   ,   CMP:   Lab Results   Component Value Date    K 4 1 08/26/2020    CL 91 (L) 08/26/2020    CO2 30 08/26/2020    BUN 3 (L) 08/26/2020    CREATININE 0 61 08/26/2020    CALCIUM 9 1 08/26/2020    AST 49 (H) 08/26/2020    ALT 38 08/26/2020    ALKPHOS 129 (H) 08/26/2020    EGFR 113 08/26/2020   ,   Lipase:   Lab Results   Component Value Date    LIPASE 203 08/26/2020   ,    Phosphorous:   Lab Results   Component Value Date    PHOS 4 1 08/26/2020

## 2020-08-26 NOTE — PROGRESS NOTES
Progress Note - Gary Beal 1965, 48 y o  male MRN: [id number]    Unit/Bed#: 426-01 Encounter: [id number]    Primary Care Provider: Ammy Benitez PA-C   Date and time admitted to hospital: 8/20/2020  5:22 PM        * Pancreatitis, alcoholic, acute  Assessment & Plan  · Continue a surgical soft/lite diet/low fat diet  · Continue NSS IV fluids with 20 meQ KCl at 50 ml/hr  · Utilize PRN IV dilaudid and PRN IV zofran    Hypomagnesemia  Assessment & Plan  · Give magnesium sulfate 2 grams IV x 1 dose  · Follow the magnesium level    [facility]  Assessment & Plan  · The CIWA protocol has been discontinued  · Continue thiamine 100 mg PO Qdaily, folic acid 1 mg PO Qdaily, and a daily PO multivitamin  · Pt refused alcohol rehab    Left anterior fascicular block  Assessment & Plan  · Outpatient Cardiology evaluation    Lesion of spleen  Assessment & Plan  CT scan of the abdomen/pelvis (08/20/2020):     · SPLEEN:  Calcified granulomata are noted in the spleen  No suspicious splenic mass  Outpatient follow-up with PCP in regards to this matter    Transaminitis  Assessment & Plan  · Likely secondary to alcohol abuse  · Avoid all hepatotoxic agents  · Follow the LFT's (liver function tests)  · Outpatient follow-up with Gastroenterology    Results for Rosalinda Calderon (MRN [id number]) as of 8/25/2020 13:13   Ref   Range 8/24/2020 05:10   HEPATITIS A IGM ANTIBODY Latest Ref Range: Non-reactive, Equivocal-Suggest Recollect  Non-reactive   HEPATITIS B SURFACE ANTIGEN Latest Ref Range: Non-reactive, NonReactive - Confirmed  Non-reactive   HEPATITIS B CORE IGM ANTIBODY Latest Ref Range: Non-reactive  Non-reactive   HEPATITIS C ANTIBODY Latest Ref Range: Non-reactive  Non-reactive       Constipation  Assessment & Plan  · Continue colace 100 mg PO BID  · Utilize miralax 17 grams PO Qdaily PRN constipation    Tobacco use  Assessment & Plan  · Nicotine patch  · Smoking cessation counseling    Pancytopenia [facility])  Assessment & Plan  · Likely secondary to alcohol abuse  · Follow the CBC  · Outpatient Hematology/Oncology evaluation    Pancreatic mass  Assessment & Plan  · The patient was seen in consultation by Gastroenterology  · Needs an endoscopic ultrasound completed at some point to rule-out malignancy    Results for Dante Tom (MRN [id number]) as of 8/26/2020 13:13   Ref  Range 8/23/2020 06:08   CA 19-9 Latest Ref Range: 0 - 35 U/mL 1     MRI of the abdomen (08/05/2020): IMPRESSION:     20 mm cystic lesion in the uncinate process of the pancreas shows a short interval increase in size and complexity when compared to July  Relatively rapid evolution probably favors postinflammatory pseudocyst   However, apparent nonenhancing mural nodularity is a worrisome feature warranting gastroenterology referral for endoscopic ultrasound  No high risk features  Celiac artery stenosis (HCC)  Assessment & Plan  · Outpatient Vascular Surgery evaluation    Traore esophagus  Assessment & Plan  · Continue PPI treatment  · Outpatient surveillance imaging with Gastroenterology    [facility]ocyt[facility] ([facility] )  Assessment & Plan  · Likely secondary to alcohol abuse  · Monitor for any signs of bleeding  · Follow the platelet count    Hx of seizure disorder  Assessment & Plan  · Continue PO keppra  · Outpatient follow-up with Neurology    Hypokalemia  Assessment & Plan  · Improved with potassium chloride supplementation treatment  · Follow the potassium level    Hypophosphatemia  Assessment & Plan  · Resolved with phosphorus supplementation treatment  · Follow the phosphorus level    Bladder wall thickening  Assessment & Plan  · Observed on CT scan    Results for Dante Tom (MRN [id number]) as of 8/25/2020 13:13   Ref   Range 8/22/2020 09:50   Color, UA Unknown Yellow   Clarity, UA Unknown Clear   SL AMB SPECIFIC GRAVITY_URINE Latest Ref Range: 1 003 - 1 030  1 015   Glucose, UA Latest Ref Range: Negative mg/dl Negative   Ketones, UA Latest Ref Range: Negative mg/dl 15 (1+) (A)   Blood, UA Latest Ref Range: Negative  Negative   Nitrite, UA Latest Ref Range: Negative  Negative   Leukocytes, UA Latest Ref Range: Negative  Negative   pH, UA Latest Ref Range: 4 5, 5 0, 5 5, 6 0, 6 5, 7 0, 7 5, 8 0  7 5   POCT URINE PROTEIN Latest Ref Range: Negative mg/dl Negative   Bilirubin, UA Latest Ref Range: Negative  Negative   SL AMB POCT UROBILINOGEN Latest Ref Range: 0 2, 1 0 E U /dl E U /dl 1 0   RBC, UA Latest Ref Range: None Seen, 0-5 /hpf None Seen   WBC, UA Latest Ref Range: None Seen, 0-5, 5-55, 5-65 /hpf None Seen   Bacteria, UA Latest Ref Range: None Seen, Occasional /hpf None Seen     08/22/2020 0950  08/23/2020 2255  Urine culture [[id number]]    Urine, Clean Catch     Final result  Component  Value    Urine Culture  No Growth <1000 cfu/mL             Vitamin D deficiency  Assessment & Plan  · Utilize cholecalciferol 1000 I  U  PO Qdaily, which should be continued upon discharge  · Recheck a vitamin D 25-OH level in 1-2 months with his PCP  Outpatient follow-up with PCP in regards to this matter    Hepatic steatosis  Assessment & Plan  · Likely secondary to alcohol abuse  · Alcohol cessation counseling was given to the patient  · Lifestyle modifications are recommended including weight loss, improving his diet, and increasing his amount of activity  · Avoid all hepatotoxic agents  · Follow the LFT's (liver function tests)  · Outpatient surveillance imaging and follow-up with Gastroenterology    Essential hypertension  Assessment & Plan  · Continue PO lisinopril for now  · Utilize PRN IV labetalol  · Follow the blood pressure trend      VTE Pharmacologic Prophylaxis:   Pharmacologic: Heparin  Mechanical VTE Prophylaxis in Place: Yes    Patient Centered Rounds: I have performed bedside rounds with nursing staff today  Discussions with Specialists or Other Care Team Provider: I discussed the case with Gastroenterology      Time Spent for Care: 30 minutes  More than 50% of total time spent on counseling and coordination of care as described above  Current Length of Stay: 5 day(s)    Current Patient Status: Inpatient   Certification Statement: The patient will continue to require additional inpatient hospital stay due to the need for continuous IV fluids and with the patient requiring IV pain medications to achieve adequate pain control  Code Status: Level 1 - Full Code      Subjective: The patient was seen and examined  The patient continues to experience severe, epigastric abdominal pain  No nausea or vomiting  Objective:     Vitals:   Temp (24hrs), Av °F (37 2 °C), Min:99 °F (37 2 °C), Max:99 °F (37 2 °C)    Temp:  [99 °F (37 2 °C)] 99 °F (37 2 °C)  HR:  [78] 78  Resp:  [16] 16  BP: (134)/(80) 134/80  SpO2:  [98 %] 98 %  Body mass index is 20 53 kg/m²  Input and Output Summary (last 24 hours):        Intake/Output Summary (Last 24 hours) at 2020 1322  Last data filed at 2020 1255  Gross per 24 hour   Intake 2598 75 ml   Output    Net 2598 75 ml       Physical Exam:     Physical Exam  General:  NAD, follows commands  HEENT:  NC/AT, mucous membranes moist  Neck:  Supple, No JVP elevation  CV:  + S1, + S2, RRR  Pulm:  Lung fields are CTA bilaterally  Abd:  Soft, Epigastric abdominal pain with palpation, Non-distended  Ext:  No clubbing/cyanosis/edema  Skin:  No rashes  Neuro:  Awake, alert, oriented  Psych:  Normal mood and affect      Additional Data:    Labs:    Results from last 7 days   Lab Units 20  0420 20  0510   WBC Thousand/uL 3 95* 3 72*   HEMOGLOBIN g/dL 8 7* 9 6*   HEMATOCRIT % 28 2* 32 2*   PLATELETS Thousands/uL 121* 114*   NEUTROS PCT %  --  51   LYMPHS PCT %  --  29   LYMPHO PCT % 31  --    MONOS PCT %  --  17*   MONO PCT % 23*  --    EOS PCT % 1 2     Results from last 7 days   Lab Units 20  0420   SODIUM mmol/L 126*   POTASSIUM mmol/L 4 1   CHLORIDE mmol/L 91*   CO2 mmol/L 30   BUN mg/dL 3* CREATININE mg/dL 0 61   ANION GAP mmol/L 5   CALCIUM mg/dL 9 1   ALBUMIN g/dL 3 2*   TOTAL BILIRUBIN mg/dL 0 70   ALK PHOS U/L 129*   ALT U/L 38   AST U/L 49*   GLUCOSE RANDOM mg/dL 108     Results from last 7 days   Lab Units 08/21/20  0543   INR  1 18             Results from last 7 days   Lab Units 08/25/20  0518 08/24/20  0510 08/23/20  0608 08/20/20  1736   LACTIC ACID mmol/L  --   --  1 3 1 9   PROCALCITONIN ng/ml <0 05 <0 05 <0 05  --            * I Have Reviewed All Lab Data Listed Above  * Additional Pertinent Lab Tests Reviewed:  All [place] Admission Reviewed      Recent Cultures (last 7 days):     Results from last 7 days   Lab Units 08/22/20  0950   URINE CULTURE  No Growth <1000 cfu/mL       Last 24 Hours Medication List:   Current Facility-Administered Medications   Medication Dose Route Frequency Provider Last Rate    albuterol  2 puff Inhalation Q4H PRN Dante Cruz MD      albuterol  2 5 mg Nebulization Q6H PRN Dante Cruz MD      cholecalciferol  1,000 Units Oral Daily [facility], DO      cyanocobalamin  100 mcg Oral Daily Dante Cruz MD      docusate sodium  100 mg Oral BID [facility], DO      folic acid  1 mg Oral Daily Dante Cruz MD      heparin (porcine)  5,000 Units Subcutaneous Q8H [address] [place] Justin MD      HYDROmorphone  0 5 mg Intravenous Q3H PRN [facility], DO      Or    HYDROmorphone  1 mg Intravenous Q3H PRN [facility], DO      Labetalol HCl  10 mg Intravenous Q4H PRN [facility], DO      levETIRAcetam  1,000 mg Oral Q12H [address] Td Salinas DO      lisinopril  10 mg Oral Daily [facility], DO      magnesium sulfate  2 g Intravenous Once [facility], DO      multivitamin-minerals  1 tablet Oral Daily Dante Cruz MD      nicotine  1 patch Transdermal Daily Dante Cruz MD      ondansetron  4 mg Intravenous Q4H PRN Dante rCuz MD      pantoprazole  40 mg Oral Early Morning Shamir Sifuentes PA-C      polyethylene glycol  17 g Oral Daily PRN José Zhang DO      polyethylene glycol  17 g Oral Daily Shamir Sifuentes PA-C      sodium chloride (PF)  3 mL Intravenous Q1H PRN Elizabeth Sheets DO      sodium chloride 0 9 % with KCl 20 mEq/L  50 mL/hr Intravenous Continuous José Zhang DO 75 mL/hr (08/26/20 0302)    thiamine  100 mg Oral Daily Joseph Greco MD          Today, Patient Was Seen By: José Zhang DO    ** Please Note: Dictation voice to text software may have been used in the creation of this document   **

## 2020-08-27 LAB
ALBUMIN SERPL BCP-MCNC: 3.4 G/DL (ref 3.5–5)
ALP SERPL-CCNC: 140 U/L (ref 46–116)
ALT SERPL W P-5'-P-CCNC: 38 U/L (ref 12–78)
ANION GAP SERPL CALCULATED.3IONS-SCNC: 7 MMOL/L (ref 4–13)
AST SERPL W P-5'-P-CCNC: 53 U/L (ref 5–45)
BASOPHILS # BLD AUTO: 0.04 THOUSANDS/ΜL (ref 0–0.1)
BASOPHILS NFR BLD AUTO: 1 % (ref 0–1)
BILIRUB SERPL-MCNC: 0.7 MG/DL (ref 0.2–1)
BUN SERPL-MCNC: 6 MG/DL (ref 5–25)
CA-I BLD-SCNC: 1.19 MMOL/L (ref 1.12–1.32)
CALCIUM SERPL-MCNC: 9.2 MG/DL (ref 8.3–10.1)
CHLORIDE SERPL-SCNC: 93 MMOL/L (ref 100–108)
CO2 SERPL-SCNC: 30 MMOL/L (ref 21–32)
CREAT SERPL-MCNC: 0.71 MG/DL (ref 0.6–1.3)
EOSINOPHIL # BLD AUTO: 0.14 THOUSAND/ΜL (ref 0–0.61)
EOSINOPHIL NFR BLD AUTO: 3 % (ref 0–6)
ERYTHROCYTE [DISTWIDTH] IN BLOOD BY AUTOMATED COUNT: 18.1 % (ref 11.6–15.1)
GFR SERPL CREATININE-BSD FRML MDRD: 106 ML/MIN/1.73SQ M
GLUCOSE SERPL-MCNC: 112 MG/DL (ref 65–140)
HCT VFR BLD AUTO: 29.1 % (ref 36.5–49.3)
HGB BLD-MCNC: 9.1 G/DL (ref 12–17)
IMM GRANULOCYTES # BLD AUTO: 0 THOUSAND/UL (ref 0–0.2)
IMM GRANULOCYTES NFR BLD AUTO: 0 % (ref 0–2)
LYMPHOCYTES # BLD AUTO: 1.42 THOUSANDS/ΜL (ref 0.6–4.47)
LYMPHOCYTES NFR BLD AUTO: 33 % (ref 14–44)
MAGNESIUM SERPL-MCNC: 1.5 MG/DL (ref 1.6–2.6)
MCH RBC QN AUTO: 25 PG (ref 26.8–34.3)
MCHC RBC AUTO-ENTMCNC: 31.3 G/DL (ref 31.4–37.4)
MCV RBC AUTO: 80 FL (ref 82–98)
MONOCYTES # BLD AUTO: 1.03 THOUSAND/ΜL (ref 0.17–1.22)
MONOCYTES NFR BLD AUTO: 24 % (ref 4–12)
NEUTROPHILS # BLD AUTO: 1.69 THOUSANDS/ΜL (ref 1.85–7.62)
NEUTS SEG NFR BLD AUTO: 39 % (ref 43–75)
NRBC BLD AUTO-RTO: 0 /100 WBCS
PHOSPHATE SERPL-MCNC: 4.7 MG/DL (ref 2.7–4.5)
PLATELET # BLD AUTO: 166 THOUSANDS/UL (ref 149–390)
PMV BLD AUTO: 10.9 FL (ref 8.9–12.7)
POTASSIUM SERPL-SCNC: 4 MMOL/L (ref 3.5–5.3)
PROT SERPL-MCNC: 8 G/DL (ref 6.4–8.2)
RBC # BLD AUTO: 3.64 MILLION/UL (ref 3.88–5.62)
SODIUM SERPL-SCNC: 130 MMOL/L (ref 136–145)
WBC # BLD AUTO: 4.32 THOUSAND/UL (ref 4.31–10.16)

## 2020-08-27 PROCEDURE — 83735 ASSAY OF MAGNESIUM: CPT | Performed by: INTERNAL MEDICINE

## 2020-08-27 PROCEDURE — 82330 ASSAY OF CALCIUM: CPT | Performed by: INTERNAL MEDICINE

## 2020-08-27 PROCEDURE — 84100 ASSAY OF PHOSPHORUS: CPT | Performed by: INTERNAL MEDICINE

## 2020-08-27 PROCEDURE — 99232 SBSQ HOSP IP/OBS MODERATE 35: CPT | Performed by: INTERNAL MEDICINE

## 2020-08-27 PROCEDURE — 85025 COMPLETE CBC W/AUTO DIFF WBC: CPT | Performed by: INTERNAL MEDICINE

## 2020-08-27 PROCEDURE — 80053 COMPREHEN METABOLIC PANEL: CPT | Performed by: INTERNAL MEDICINE

## 2020-08-27 RX ORDER — HYDROMORPHONE HCL/PF 1 MG/ML
1 SYRINGE (ML) INJECTION
Status: DISCONTINUED | OUTPATIENT
Start: 2020-08-27 | End: 2020-08-29 | Stop reason: HOSPADM

## 2020-08-27 RX ORDER — MAGNESIUM SULFATE HEPTAHYDRATE 40 MG/ML
2 INJECTION, SOLUTION INTRAVENOUS ONCE
Status: COMPLETED | OUTPATIENT
Start: 2020-08-27 | End: 2020-08-27

## 2020-08-27 RX ORDER — HYDROMORPHONE HCL 110MG/55ML
2 PATIENT CONTROLLED ANALGESIA SYRINGE INTRAVENOUS
Status: DISCONTINUED | OUTPATIENT
Start: 2020-08-27 | End: 2020-08-29 | Stop reason: HOSPADM

## 2020-08-27 RX ADMIN — HYDROMORPHONE HYDROCHLORIDE 1 MG: 1 INJECTION, SOLUTION INTRAMUSCULAR; INTRAVENOUS; SUBCUTANEOUS at 00:30

## 2020-08-27 RX ADMIN — HEPARIN SODIUM 5000 UNITS: 5000 INJECTION INTRAVENOUS; SUBCUTANEOUS at 15:19

## 2020-08-27 RX ADMIN — LEVETIRACETAM 1000 MG: 500 TABLET ORAL at 21:34

## 2020-08-27 RX ADMIN — DOCUSATE SODIUM 100 MG: 100 CAPSULE, LIQUID FILLED ORAL at 09:05

## 2020-08-27 RX ADMIN — FOLIC ACID 1 MG: 1 TABLET ORAL at 09:04

## 2020-08-27 RX ADMIN — DOCUSATE SODIUM 100 MG: 100 CAPSULE, LIQUID FILLED ORAL at 17:33

## 2020-08-27 RX ADMIN — VITAMIN D, TAB 1000IU (100/BT) 1000 UNITS: 25 TAB at 09:04

## 2020-08-27 RX ADMIN — MAGNESIUM SULFATE IN WATER 2 G: 40 INJECTION, SOLUTION INTRAVENOUS at 09:04

## 2020-08-27 RX ADMIN — HYDROMORPHONE HYDROCHLORIDE 2 MG: 2 INJECTION INTRAMUSCULAR; INTRAVENOUS; SUBCUTANEOUS at 19:57

## 2020-08-27 RX ADMIN — POTASSIUM CHLORIDE AND SODIUM CHLORIDE 50 ML/HR: 900; 150 INJECTION, SOLUTION INTRAVENOUS at 21:33

## 2020-08-27 RX ADMIN — Medication 100 MCG: at 09:05

## 2020-08-27 RX ADMIN — POLYETHYLENE GLYCOL 3350 17 G: 17 POWDER, FOR SOLUTION ORAL at 09:04

## 2020-08-27 RX ADMIN — LISINOPRIL 10 MG: 10 TABLET ORAL at 09:05

## 2020-08-27 RX ADMIN — THIAMINE HCL TAB 100 MG 100 MG: 100 TAB at 09:04

## 2020-08-27 RX ADMIN — POTASSIUM CHLORIDE AND SODIUM CHLORIDE 50 ML/HR: 900; 150 INJECTION, SOLUTION INTRAVENOUS at 03:40

## 2020-08-27 RX ADMIN — MULTIPLE VITAMINS W/ MINERALS TAB 1 TABLET: TAB at 09:05

## 2020-08-27 RX ADMIN — LEVETIRACETAM 1000 MG: 500 TABLET ORAL at 09:05

## 2020-08-27 RX ADMIN — HYDROMORPHONE HYDROCHLORIDE 2 MG: 2 INJECTION INTRAMUSCULAR; INTRAVENOUS; SUBCUTANEOUS at 23:42

## 2020-08-27 RX ADMIN — HYDROMORPHONE HYDROCHLORIDE 1 MG: 1 INJECTION, SOLUTION INTRAMUSCULAR; INTRAVENOUS; SUBCUTANEOUS at 03:40

## 2020-08-27 RX ADMIN — PANTOPRAZOLE SODIUM 40 MG: 40 TABLET, DELAYED RELEASE ORAL at 06:11

## 2020-08-27 RX ADMIN — HEPARIN SODIUM 5000 UNITS: 5000 INJECTION INTRAVENOUS; SUBCUTANEOUS at 21:34

## 2020-08-27 RX ADMIN — HYDROMORPHONE HYDROCHLORIDE 2 MG: 2 INJECTION INTRAMUSCULAR; INTRAVENOUS; SUBCUTANEOUS at 15:23

## 2020-08-27 RX ADMIN — HEPARIN SODIUM 5000 UNITS: 5000 INJECTION INTRAVENOUS; SUBCUTANEOUS at 06:11

## 2020-08-27 NOTE — ASSESSMENT & PLAN NOTE
· Likely secondary to alcohol abuse  · Avoid all hepatotoxic agents  · Follow the LFT's (liver function tests)  · Outpatient follow-up with Gastroenterology    Results for Rosalinda Calderon (MRN [id number]) as of 8/25/2020 13:13   Ref   Range 8/24/2020 05:10   HEPATITIS A IGM ANTIBODY Latest Ref Range: Non-reactive, Equivocal-Suggest Recollect  Non-reactive   HEPATITIS B SURFACE ANTIGEN Latest Ref Range: Non-reactive, NonReactive - Confirmed  Non-reactive   HEPATITIS B CORE IGM ANTIBODY Latest Ref Range: Non-reactive  Non-reactive   HEPATITIS C ANTIBODY Latest Ref Range: Non-reactive  Non-reactive

## 2020-08-27 NOTE — PROGRESS NOTES
Progress Note - Jes Schroeder 1965, 48 y o  male MRN: [id number]    Unit/Bed#: 426-01 Encounter: [id number]    Primary Care Provider: Yamilet Hall PA-C   Date and time admitted to hospital: 8/20/2020  5:22 PM        * Pancreatitis, alcoholic, acute  Assessment & Plan  · Still with severe epigastric abdominal pain  · Continue a surgical soft/lite diet/low fat diet  · Continue NSS IV fluids with 20 meQ KCl at 50 ml/hr  · Utilize PRN IV dilaudid and PRN IV zofran    Hypomagnesemia  Assessment & Plan  · Give magnesium sulfate 2 grams IV x 1 dose  · Follow the magnesium level    [facility]  Assessment & Plan  · The CIWA protocol has been discontinued  · Continue thiamine 100 mg PO Qdaily, folic acid 1 mg PO Qdaily, and a daily PO multivitamin  · Pt refused alcohol rehab    Left anterior fascicular block  Assessment & Plan  · Outpatient Cardiology evaluation    Lesion of spleen  Assessment & Plan  CT scan of the abdomen/pelvis (08/20/2020):     · SPLEEN:  Calcified granulomata are noted in the spleen  No suspicious splenic mass  Outpatient follow-up with PCP in regards to this matter    Transaminitis  Assessment & Plan  · Likely secondary to alcohol abuse  · Avoid all hepatotoxic agents  · Follow the LFT's (liver function tests)  · Outpatient follow-up with Gastroenterology    Results for Valente Cross (MRN [id number]) as of 8/25/2020 13:13   Ref   Range 8/24/2020 05:10   HEPATITIS A IGM ANTIBODY Latest Ref Range: Non-reactive, Equivocal-Suggest Recollect  Non-reactive   HEPATITIS B SURFACE ANTIGEN Latest Ref Range: Non-reactive, NonReactive - Confirmed  Non-reactive   HEPATITIS B CORE IGM ANTIBODY Latest Ref Range: Non-reactive  Non-reactive   HEPATITIS C ANTIBODY Latest Ref Range: Non-reactive  Non-reactive       Constipation  Assessment & Plan  · Continue colace 100 mg PO BID  · Utilize miralax 17 grams PO Qdaily PRN constipation    Tobacco use  Assessment & Plan  · Nicotine patch  · Smoking cessation counseling    [facility]  Assessment & Plan  · Likely secondary to alcohol abuse  · Follow the CBC  · Outpatient Hematology/Oncology evaluation    Pancreatic mass  Assessment & Plan  · The patient was seen in consultation by Gastroenterology  · Needs an endoscopic ultrasound completed at some point to rule-out malignancy    Results for Shadia Bruner (MRN [id number]) as of 8/26/2020 13:13   Ref  Range 8/23/2020 06:08   CA 19-9 Latest Ref Range: 0 - 35 U/mL 1     MRI of the abdomen (08/05/2020): IMPRESSION:     20 mm cystic lesion in the uncinate process of the pancreas shows a short interval increase in size and complexity when compared to July  Relatively rapid evolution probably favors postinflammatory pseudocyst   However, apparent nonenhancing mural nodularity is a worrisome feature warranting gastroenterology referral for endoscopic ultrasound  No high risk features  Celiac artery stenosis (HCC)  Assessment & Plan  · Outpatient Vascular Surgery evaluation    Traore esophagus  Assessment & Plan  · Continue PPI treatment  · Outpatient surveillance imaging with Gastroenterology    Thrombocytopenia ([facility] )  Assessment & Plan  · Likely secondary to alcohol abuse  · Monitor for any signs of bleeding  · Follow the platelet count    Hx of seizure disorder  Assessment & Plan  · Continue PO keppra  · Outpatient follow-up with Neurology    Hypokalemia  Assessment & Plan  · Improved with potassium chloride supplementation treatment  · Follow the potassium level    Hypophosphatemia  Assessment & Plan  · Resolved with phosphorus supplementation treatment  · Now with hyperphosphatemia  · Follow the phosphorus level    Bladder wall thickening  Assessment & Plan  · Observed on CT scan  · Outpatient Urology evaluation    Results for Shadia Bruner (MRN [id number]) as of 8/25/2020 13:13   Ref   Range 8/22/2020 09:50   Color, UA Unknown Yellow   Clarity, UA Unknown Clear   SL AMB SPECIFIC GRAVITY_URINE Latest Ref Range: 1 003 - 1 030  1 015   Glucose, UA Latest Ref Range: Negative mg/dl Negative   Ketones, UA Latest Ref Range: Negative mg/dl 15 (1+) (A)   Blood, UA Latest Ref Range: Negative  Negative   Nitrite, UA Latest Ref Range: Negative  Negative   Leukocytes, UA Latest Ref Range: Negative  Negative   pH, UA Latest Ref Range: 4 5, 5 0, 5 5, 6 0, 6 5, 7 0, 7 5, 8 0  7 5   POCT URINE PROTEIN Latest Ref Range: Negative mg/dl Negative   Bilirubin, UA Latest Ref Range: Negative  Negative   SL AMB POCT UROBILINOGEN Latest Ref Range: 0 2, 1 0 E U /dl E U /dl 1 0   RBC, UA Latest Ref Range: None Seen, 0-5 /hpf None Seen   WBC, UA Latest Ref Range: None Seen, 0-5, 5-55, 5-65 /hpf None Seen   Bacteria, UA Latest Ref Range: None Seen, Occasional /hpf None Seen     08/22/2020 0950  08/23/2020 2255  Urine culture [[id number]]    Urine, Clean Catch     Final result  Component  Value    Urine Culture  No Growth <1000 cfu/mL             Vitamin D deficiency  Assessment & Plan  · Utilize cholecalciferol 1000 I  U  PO Qdaily, which should be continued upon discharge  · Recheck a vitamin D 25-OH level in 1-2 months with his PCP  Outpatient follow-up with PCP in regards to this matter    Hepatic steatosis  Assessment & Plan  · Likely secondary to alcohol abuse  · Alcohol cessation counseling was given to the patient  · Lifestyle modifications are recommended including weight loss, improving his diet, and increasing his amount of activity  · Avoid all hepatotoxic agents  · Follow the LFT's (liver function tests)  · Outpatient surveillance imaging and follow-up with Gastroenterology    Essential hypertension  Assessment & Plan  · Continue PO lisinopril for now  · Utilize PRN IV labetalol  · Follow the blood pressure trend      VTE Pharmacologic Prophylaxis:   Pharmacologic: Heparin  Mechanical VTE Prophylaxis in Place: Yes    Patient Centered Rounds: I have performed bedside rounds with nursing staff today      Time Spent for Care: 30 minutes  More than 50% of total time spent on counseling and coordination of care as described above  Current Length of Stay: 6 day(s)    Current Patient Status: Inpatient   Certification Statement: The patient will continue to require additional inpatient hospital stay due to the need for continuous IV fluids and with the patient requiring regular dosing of IV dilaudid to achieve adequate pain control  Code Status: Level 1 - Full Code      Subjective: The patient was seen and examined  The patient continues to experience severe, epigastric abdominal pain  Objective:     Vitals:   Temp (24hrs), Av 6 °F (37 °C), Min:98 3 °F (36 8 °C), Max:98 8 °F (37 1 °C)    Temp:  [98 3 °F (36 8 °C)-98 8 °F (37 1 °C)] 98 3 °F (36 8 °C)  HR:  [74-77] 76  Resp:  [16-18] 16  BP: (110-125)/(70-78) 125/78  SpO2:  [98 %-100 %] 98 %  Body mass index is 20 53 kg/m²  Input and Output Summary (last 24 hours):        Intake/Output Summary (Last 24 hours) at 2020 1210  Last data filed at 2020 0847  Gross per 24 hour   Intake 1377 08 ml   Output    Net 1377 08 ml       Physical Exam:     Physical Exam  General:  NAD, follows commands  HEENT:  NC/AT, mucous membranes moist  Neck:  Supple, No JVP elevation  CV:  + S1, + S2, RRR  Pulm:  Lung fields are CTA bilaterally  Abd:  Soft, Epigastric abdominal pain with palpation, Non-distended  Ext:  No clubbing/cyanosis/edema  Skin:  No rashes  Neuro:  Awake, alert, oriented  Psych:  Normal mood and affect      Additional Data:    Labs:    Results from last 7 days   Lab Units 20  0444   WBC Thousand/uL 4 32   HEMOGLOBIN g/dL 9 1*   HEMATOCRIT % 29 1*   PLATELETS Thousands/uL 166   NEUTROS PCT % 39*   LYMPHS PCT % 33   MONOS PCT % 24*   EOS PCT % 3     Results from last 7 days   Lab Units 20  0444   SODIUM mmol/L 130*   POTASSIUM mmol/L 4 0   CHLORIDE mmol/L 93*   CO2 mmol/L 30   BUN mg/dL 6   CREATININE mg/dL 0 71   ANION GAP mmol/L 7   CALCIUM mg/dL 9 2 ALBUMIN g/dL 3 4*   TOTAL BILIRUBIN mg/dL 0 70   ALK PHOS U/L 140*   ALT U/L 38   AST U/L 53*   GLUCOSE RANDOM mg/dL 112     Results from last 7 days   Lab Units 08/21/20  0543   INR  1 18             Results from last 7 days   Lab Units 08/25/20  0518 08/24/20  0510 08/23/20  0608 08/20/20  1736   LACTIC ACID mmol/L  --   --  1 3 1 9   PROCALCITONIN ng/ml <0 05 <0 05 <0 05  --            * I Have Reviewed All Lab Data Listed Above  * Additional Pertinent Lab Tests Reviewed:  [facility]      Recent Cultures (last 7 days):     Results from last 7 days   Lab Units 08/22/20  0950   URINE CULTURE  No Growth <1000 cfu/mL       Last 24 Hours Medication List:   Current Facility-Administered Medications   Medication Dose Route Frequency Provider Last Rate    albuterol  2 puff Inhalation Q4H PRN Poppy Vazquez MD      albuterol  2 5 mg Nebulization Q6H PRN Poppy Vazquez MD      cholecalciferol  1,000 Units Oral Daily Donny, DO      cyanocobalamin  100 mcg Oral Daily Poppy Vazquez MD      docusate sodium  100 mg Oral BID Donny, DO      folic acid  1 mg Oral Daily Poppy Vazquez MD      heparin (porcine)  5,000 Units Subcutaneous Q8H [address] Yanelis Draper MD      HYDROmorphone  1 mg Intravenous Q3H PRN Donny, DO      Or    HYDROmorphone  2 mg Intravenous Q3H PRN Donny, DO      Labetalol HCl  10 mg Intravenous Q4H PRN Donny, DO      levETIRAcetam  1,000 mg Oral Q12H [address] [facility],       lisinopril  10 mg Oral Daily Donny, DO      multivitamin-minerals  1 tablet Oral Daily Poppy Vazquez MD      nicotine  1 patch Transdermal Daily Poppy Vazquez MD      ondansetron  4 mg Intravenous Q4H PRN Poppy Vazquez MD      pantoprazole  40 mg Oral Early Morning Farrah Truong PA-C      polyethylene glycol  17 g Oral Daily PRN Donny, DO      polyethylene glycol  17 g Oral Daily Dairl Burkitt, PA-C      sodium chloride (PF)  3 mL Intravenous Q1H PRN Ruth Ann Fontaine DO      sodium chloride 0 9 % with KCl 20 mEq/L  50 mL/hr Intravenous Continuous Alfredo Shah DO 50 mL/hr (08/27/20 0340)    thiamine  100 mg Oral Daily Torito Walker MD          Today, Patient Was Seen By: Alfredo Shah DO    ** Please Note: Dictation voice to text software may have been used in the creation of this document   **

## 2020-08-27 NOTE — ASSESSMENT & PLAN NOTE
· Observed on CT scan  · Outpatient Urology evaluation    Results for Jamila Lind (MRN [id number]) as of 8/25/2020 13:13   Ref   Range 8/22/2020 09:50   Color, UA Unknown Yellow   Clarity, UA Unknown Clear   SL AMB SPECIFIC GRAVITY_URINE Latest Ref Range: 1 003 - 1 030  1 015   Glucose, UA Latest Ref Range: Negative mg/dl Negative   Ketones, UA Latest Ref Range: Negative mg/dl 15 (1+) (A)   Blood, UA Latest Ref Range: Negative  Negative   Nitrite, UA Latest Ref Range: Negative  Negative   Leukocytes, UA Latest Ref Range: Negative  Negative   pH, UA Latest Ref Range: 4 5, 5 0, 5 5, 6 0, 6 5, 7 0, 7 5, 8 0  7 5   POCT URINE PROTEIN Latest Ref Range: Negative mg/dl Negative   Bilirubin, UA Latest Ref Range: Negative  Negative   SL AMB POCT UROBILINOGEN Latest Ref Range: 0 2, 1 0 E U /dl E U /dl 1 0   RBC, UA Latest Ref Range: None Seen, 0-5 /hpf None Seen   WBC, UA Latest Ref Range: None Seen, 0-5, 5-55, 5-65 /hpf None Seen   Bacteria, UA Latest Ref Range: None Seen, Occasional /hpf None Seen     08/22/2020 0950  08/23/2020 2255  Urine culture [[id number]]    Urine, Clean Catch     Final result  Component  Value    Urine Culture  No Growth <1000 cfu/mL

## 2020-08-27 NOTE — ASSESSMENT & PLAN NOTE
· The patient was seen in consultation by Gastroenterology  · Needs an endoscopic ultrasound completed at some point to rule-out malignancy    Results for Clarice Pabon (MRN [id number]) as of 8/26/2020 13:13   Ref  Range 8/23/2020 06:08   CA 19-9 Latest Ref Range: 0 - 35 U/mL 1     MRI of the abdomen (08/05/2020): IMPRESSION:     20 mm cystic lesion in the uncinate process of the pancreas shows a short interval increase in size and complexity when compared to July  Relatively rapid evolution probably favors postinflammatory pseudocyst   However, apparent nonenhancing mural nodularity is a worrisome feature warranting gastroenterology referral for endoscopic ultrasound  No high risk features

## 2020-08-27 NOTE — ASSESSMENT & PLAN NOTE
· Resolved with phosphorus supplementation treatment  · Now with hyperphosphatemia  · Follow the phosphorus level

## 2020-08-27 NOTE — PLAN OF CARE
Problem: PAIN - ADULT  Goal: Verbalizes/displays adequate comfort level or baseline comfort level  Description: Interventions:  - Encourage patient to monitor pain and request assistance  - Assess pain using appropriate pain scale  - Administer analgesics based on type and severity of pain and evaluate response  - Implement non-pharmacological measures as appropriate and evaluate response  - Consider cultural and social influences on pain and pain management  - Notify physician/advanced practitioner if interventions unsuccessful or patient reports new pain  8/27/2020 0721 by Jonah Talbert  Outcome: Progressing  8/27/2020 0721 by Jonah Talbert  Outcome: Progressing     Problem: INFECTION - ADULT  Goal: Absence or prevention of progression during hospitalization  Description: INTERVENTIONS:  - Assess and monitor for signs and symptoms of infection  - Monitor lab/diagnostic results  - Monitor all insertion sites, i e  indwelling lines, tubes, and drains  - Monitor endotracheal if appropriate and nasal secretions for changes in amount and color  - [place] appropriate cooling/warming therapies per order  - Administer medications as ordered  - Instruct and encourage patient and family to use good hand hygiene technique  - Identify and instruct in appropriate isolation precautions for identified infection/condition  8/27/2020 0721 by Jonah Talbert  Outcome: Progressing  8/27/2020 0721 by Jonah Talbert  Outcome: Progressing     Problem: SAFETY ADULT  Goal: Patient will remain free of falls  Description: INTERVENTIONS:  - Assess patient frequently for physical needs  -  Identify cognitive and physical deficits and behaviors that affect risk of falls    -  [place] fall precautions as indicated by assessment   - Educate patient/family on patient safety including physical limitations  - Instruct patient to call for assistance with activity based on assessment  - Modify environment to reduce risk of injury  - Consider OT/PT consult to assist with strengthening/mobility  8/27/2020 0721 by Cecilia Isbell  Outcome: Progressing  8/27/2020 0721 by Cecilia Isbell  Outcome: Progressing  Goal: Maintain or return to baseline ADL function  Description: INTERVENTIONS:  -  Assess patient's ability to carry out ADLs; assess patient's baseline for ADL function and identify physical deficits which impact ability to perform ADLs (bathing, care of mouth/teeth, toileting, grooming, dressing, etc )  - Assess/evaluate cause of self-care deficits   - Assess range of motion  - Assess patient's mobility; develop plan if impaired  - Assess patient's need for assistive devices and provide as appropriate  - Encourage maximum independence but intervene and supervise when necessary  - Involve family in performance of ADLs  - Assess for home care needs following discharge   - Consider OT consult to assist with ADL evaluation and planning for discharge  - Provide patient education as appropriate  8/27/2020 0721 by Cecilia Isbell  Outcome: Progressing  8/27/2020 0721 by Cecilia Isbell  Outcome: Progressing  Goal: Maintain or return mobility status to optimal level  Description: INTERVENTIONS:  - Assess patient's baseline mobility status (ambulation, transfers, stairs, etc )    - Identify cognitive and physical deficits and behaviors that affect mobility  - Identify mobility aids required to assist with transfers and/or ambulation (gait belt, sit-to-stand, lift, walker, cane, etc )  - [place] fall precautions as indicated by assessment  - Record patient progress and toleration of activity level on Mobility SBAR; progress patient to next Phase/Stage  - Instruct patient to call for assistance with activity based on assessment  - Consider rehabilitation consult to assist with strengthening/weightbearing, etc   8/27/2020 0721 by Cecilia Isbell  Outcome: Progressing  8/27/2020 0721 by Cecilia Isbell  Outcome: Progressing     Problem: DISCHARGE PLANNING  Goal: Discharge to home or other facility with appropriate resources  Description: INTERVENTIONS:  - Identify barriers to discharge w/patient and caregiver  - Arrange for needed discharge resources and transportation as appropriate  - Identify discharge learning needs (meds, wound care, etc )  - Arrange for interpretive services to assist at discharge as needed  - Refer to Case Management Department for coordinating discharge planning if the patient needs post-hospital services based on physician/advanced practitioner order or complex needs related to functional status, cognitive ability, or social support system  8/27/2020 0721 by Madeleine Soulier  Outcome: Progressing  8/27/2020 0721 by Madeleine Soulier  Outcome: Progressing     Problem: Knowledge Deficit  Goal: Patient/family/caregiver demonstrates understanding of disease process, treatment plan, medications, and discharge instructions  Description: Complete learning assessment and assess knowledge base    Interventions:  - Provide teaching at level of understanding  - Provide teaching via preferred learning methods  8/27/2020 0721 by Madeleine Soulier  Outcome: Progressing  8/27/2020 0721 by Madeleine Soulier  Outcome: Progressing     Problem: GASTROINTESTINAL - ADULT  Goal: Minimal or absence of nausea and/or vomiting  Description: INTERVENTIONS:  - Administer IV fluids if ordered to ensure adequate hydration  - Maintain NPO status until nausea and vomiting are resolved  - Nasogastric tube if ordered  - Administer ordered antiemetic medications as needed  - Provide nonpharmacologic comfort measures as appropriate  - Advance diet as tolerated, if ordered  - Consider nutrition services referral to assist patient with adequate nutrition and appropriate food choices  8/27/2020 0721 by Madeleine Soulier  Outcome: Progressing  8/27/2020 0721 by Madeleine Soulier  Outcome: Progressing     Problem: METABOLIC, FLUID AND ELECTROLYTES - ADULT  Goal: Electrolytes maintained within normal limits  Description: INTERVENTIONS:  - Monitor labs and assess patient for signs and symptoms of electrolyte imbalances  - Administer electrolyte replacement as ordered  - Monitor response to electrolyte replacements, including repeat lab results as appropriate  - Instruct patient on fluid and nutrition as appropriate  8/27/2020 0721 by Chadd Burkett  Outcome: Progressing  8/27/2020 0721 by Chadd Burkett  Outcome: Progressing     Problem: Potential for Falls  Goal: Patient will remain free of falls  Description: INTERVENTIONS:  - Assess patient frequently for physical needs  -  Identify cognitive and physical deficits and behaviors that affect risk of falls    -  [place] fall precautions as indicated by assessment   - Educate patient/family on patient safety including physical limitations  - Instruct patient to call for assistance with activity based on assessment  - Modify environment to reduce risk of injury  - Consider OT/PT consult to assist with strengthening/mobility  8/27/2020 0721 by Chadd Burkett  Outcome: Progressing  8/27/2020 0721 by Chadd Burkett  Outcome: Progressing

## 2020-08-27 NOTE — ASSESSMENT & PLAN NOTE
· Still with severe epigastric abdominal pain  · Continue a surgical soft/lite diet/low fat diet  · Continue NSS IV fluids with 20 meQ KCl at 50 ml/hr  · Utilize PRN IV dilaudid and PRN IV zofran

## 2020-08-28 LAB
ALBUMIN SERPL BCP-MCNC: 3.6 G/DL (ref 3.5–5)
ALP SERPL-CCNC: 139 U/L (ref 46–116)
ALT SERPL W P-5'-P-CCNC: 39 U/L (ref 12–78)
ANION GAP SERPL CALCULATED.3IONS-SCNC: 9 MMOL/L (ref 4–13)
AST SERPL W P-5'-P-CCNC: 62 U/L (ref 5–45)
BILIRUB SERPL-MCNC: 0.6 MG/DL (ref 0.2–1)
BUN SERPL-MCNC: 4 MG/DL (ref 5–25)
CALCIUM SERPL-MCNC: 9.1 MG/DL (ref 8.3–10.1)
CHLORIDE SERPL-SCNC: 91 MMOL/L (ref 100–108)
CO2 SERPL-SCNC: 29 MMOL/L (ref 21–32)
CREAT SERPL-MCNC: 0.6 MG/DL (ref 0.6–1.3)
ERYTHROCYTE [DISTWIDTH] IN BLOOD BY AUTOMATED COUNT: 18.2 % (ref 11.6–15.1)
GFR SERPL CREATININE-BSD FRML MDRD: 114 ML/MIN/1.73SQ M
GLUCOSE SERPL-MCNC: 93 MG/DL (ref 65–140)
HCT VFR BLD AUTO: 30.1 % (ref 36.5–49.3)
HGB BLD-MCNC: 9 G/DL (ref 12–17)
MAGNESIUM SERPL-MCNC: 1.6 MG/DL (ref 1.6–2.6)
MCH RBC QN AUTO: 24.3 PG (ref 26.8–34.3)
MCHC RBC AUTO-ENTMCNC: 29.9 G/DL (ref 31.4–37.4)
MCV RBC AUTO: 81 FL (ref 82–98)
NRBC BLD AUTO-RTO: 0 /100 WBCS
PHOSPHATE SERPL-MCNC: 3.5 MG/DL (ref 2.7–4.5)
PLATELET # BLD AUTO: 180 THOUSANDS/UL (ref 149–390)
PMV BLD AUTO: 11 FL (ref 8.9–12.7)
POTASSIUM SERPL-SCNC: 3.7 MMOL/L (ref 3.5–5.3)
PROT SERPL-MCNC: 8.4 G/DL (ref 6.4–8.2)
RBC # BLD AUTO: 3.71 MILLION/UL (ref 3.88–5.62)
SODIUM SERPL-SCNC: 129 MMOL/L (ref 136–145)
WBC # BLD AUTO: 4.02 THOUSAND/UL (ref 4.31–10.16)

## 2020-08-28 PROCEDURE — 80053 COMPREHEN METABOLIC PANEL: CPT | Performed by: INTERNAL MEDICINE

## 2020-08-28 PROCEDURE — 99232 SBSQ HOSP IP/OBS MODERATE 35: CPT | Performed by: INTERNAL MEDICINE

## 2020-08-28 PROCEDURE — 85027 COMPLETE CBC AUTOMATED: CPT | Performed by: INTERNAL MEDICINE

## 2020-08-28 PROCEDURE — 83735 ASSAY OF MAGNESIUM: CPT | Performed by: INTERNAL MEDICINE

## 2020-08-28 PROCEDURE — 84100 ASSAY OF PHOSPHORUS: CPT | Performed by: INTERNAL MEDICINE

## 2020-08-28 RX ORDER — MAGNESIUM SULFATE HEPTAHYDRATE 40 MG/ML
2 INJECTION, SOLUTION INTRAVENOUS ONCE
Status: COMPLETED | OUTPATIENT
Start: 2020-08-28 | End: 2020-08-28

## 2020-08-28 RX ADMIN — HYDROMORPHONE HYDROCHLORIDE 2 MG: 2 INJECTION INTRAMUSCULAR; INTRAVENOUS; SUBCUTANEOUS at 06:35

## 2020-08-28 RX ADMIN — MULTIPLE VITAMINS W/ MINERALS TAB 1 TABLET: TAB at 09:13

## 2020-08-28 RX ADMIN — HEPARIN SODIUM 5000 UNITS: 5000 INJECTION INTRAVENOUS; SUBCUTANEOUS at 05:30

## 2020-08-28 RX ADMIN — LEVETIRACETAM 1000 MG: 500 TABLET ORAL at 09:14

## 2020-08-28 RX ADMIN — HYDROMORPHONE HYDROCHLORIDE 2 MG: 2 INJECTION INTRAMUSCULAR; INTRAVENOUS; SUBCUTANEOUS at 20:49

## 2020-08-28 RX ADMIN — MAGNESIUM SULFATE IN WATER 2 G: 40 INJECTION, SOLUTION INTRAVENOUS at 12:54

## 2020-08-28 RX ADMIN — LISINOPRIL 10 MG: 10 TABLET ORAL at 09:13

## 2020-08-28 RX ADMIN — HYDROMORPHONE HYDROCHLORIDE 1 MG: 1 INJECTION, SOLUTION INTRAMUSCULAR; INTRAVENOUS; SUBCUTANEOUS at 23:50

## 2020-08-28 RX ADMIN — ENOXAPARIN SODIUM 40 MG: 40 INJECTION SUBCUTANEOUS at 13:22

## 2020-08-28 RX ADMIN — HYDROMORPHONE HYDROCHLORIDE 1 MG: 1 INJECTION, SOLUTION INTRAMUSCULAR; INTRAVENOUS; SUBCUTANEOUS at 17:17

## 2020-08-28 RX ADMIN — DOCUSATE SODIUM 100 MG: 100 CAPSULE, LIQUID FILLED ORAL at 09:14

## 2020-08-28 RX ADMIN — DOCUSATE SODIUM 100 MG: 100 CAPSULE, LIQUID FILLED ORAL at 17:17

## 2020-08-28 RX ADMIN — HYDROMORPHONE HYDROCHLORIDE 1 MG: 1 INJECTION, SOLUTION INTRAMUSCULAR; INTRAVENOUS; SUBCUTANEOUS at 13:23

## 2020-08-28 RX ADMIN — FOLIC ACID 1 MG: 1 TABLET ORAL at 09:13

## 2020-08-28 RX ADMIN — Medication 100 MCG: at 09:13

## 2020-08-28 RX ADMIN — VITAMIN D, TAB 1000IU (100/BT) 1000 UNITS: 25 TAB at 09:13

## 2020-08-28 RX ADMIN — PANTOPRAZOLE SODIUM 40 MG: 40 TABLET, DELAYED RELEASE ORAL at 05:30

## 2020-08-28 RX ADMIN — POTASSIUM CHLORIDE AND SODIUM CHLORIDE 50 ML/HR: 900; 150 INJECTION, SOLUTION INTRAVENOUS at 17:21

## 2020-08-28 RX ADMIN — THIAMINE HCL TAB 100 MG 100 MG: 100 TAB at 09:13

## 2020-08-28 RX ADMIN — HYDROMORPHONE HYDROCHLORIDE 2 MG: 2 INJECTION INTRAMUSCULAR; INTRAVENOUS; SUBCUTANEOUS at 03:24

## 2020-08-28 RX ADMIN — HYDROMORPHONE HYDROCHLORIDE 2 MG: 2 INJECTION INTRAMUSCULAR; INTRAVENOUS; SUBCUTANEOUS at 10:21

## 2020-08-28 RX ADMIN — LEVETIRACETAM 1000 MG: 500 TABLET ORAL at 20:48

## 2020-08-28 NOTE — PROGRESS NOTES
Progress Note - Alia Pratt 48 y o  male MRN: [id number]    Unit/Bed#: 488-36 Encounter: [id number]         Assessment/ Plan:  Pancreatitis  Pancreatic cyst     Patient was admitted with epigastric/chest pain, found to have an elevated lipase as well as acute pancreatitis on imaging   He was also found to have what is likely a pancreatic pseudocyst  Recommend that he undergo endoscopic ultrasound for further evaluation  Shaila Dean denies any previous episodes of pancreatitis   This is likely secondary to alcohol as he does drink on a daily basis   Also long-time smoker    Triglycerides WNL's   He is status post cholecystectomy  Shaila Dean is tolerating a diet but continues to have pain, slowly improving     -diet as tolerated  -pain medication p r n   -outpatient EUS  -alcohol and tobacco cessation      Subjective:   Pt continues to complain of pain but a little better today  Objective:     Vitals: Blood pressure 130/91, pulse 84, temperature 98 2 °F (36 8 °C), temperature source Oral, resp  rate 18, height 5' 6" (1 676 m), weight 57 7 kg (127 lb 3 3 oz), SpO2 99 %  ,Body mass index is 20 53 kg/m²  Physical Exam: General appearance: alert and oriented, in no acute distress  Lungs: clear to auscultation bilaterally  Heart: regular rate and rhythm  Abdomen: soft, non-tender; bowel sounds normal; no masses,  no organomegaly  Skin: Skin color, texture, turgor normal  No rashes or lesions     Invasive Devices     Peripheral Intravenous Line            Peripheral IV 08/25/20 Left Antecubital 2 days                Lab, Imaging and other studies: I have personally reviewed pertinent reports       CBC:   Lab Results   Component Value Date    WBC 4 02 (L) 08/28/2020    HGB 9 0 (L) 08/28/2020    HCT 30 1 (L) 08/28/2020    MCV 81 (L) 08/28/2020     08/28/2020    MCH 24 3 (L) 08/28/2020    MCHC 29 9 (L) 08/28/2020    RDW 18 2 (H) 08/28/2020    MPV 11 0 08/28/2020    NRBC 0 08/28/2020   ,   CMP:   Lab Results   Component Value Date    K 3 7 08/28/2020    CL 91 (L) 08/28/2020    CO2 29 08/28/2020    BUN 4 (L) 08/28/2020    CREATININE 0 60 08/28/2020    CALCIUM 9 1 08/28/2020    AST 62 (H) 08/28/2020    ALT 39 08/28/2020    ALKPHOS 139 (H) 08/28/2020    EGFR 114 08/28/2020   ,     Phosphorous:   Lab Results   Component Value Date    PHOS 3 5 08/28/2020

## 2020-08-28 NOTE — PLAN OF CARE
Problem: PAIN - ADULT  Goal: Verbalizes/displays adequate comfort level or baseline comfort level  Description: Interventions:  - Encourage patient to monitor pain and request assistance  - Assess pain using appropriate pain scale  - Administer analgesics based on type and severity of pain and evaluate response  - Implement non-pharmacological measures as appropriate and evaluate response  - Consider cultural and social influences on pain and pain management  - Notify physician/advanced practitioner if interventions unsuccessful or patient reports new pain  Outcome: Progressing     Problem: INFECTION - ADULT  Goal: Absence or prevention of progression during hospitalization  Description: INTERVENTIONS:  - Assess and monitor for signs and symptoms of infection  - Monitor lab/diagnostic results  - Monitor all insertion sites, i e  indwelling lines, tubes, and drains  - Monitor endotracheal if appropriate and nasal secretions for changes in amount and color  - [place] appropriate cooling/warming therapies per order  - Administer medications as ordered  - Instruct and encourage patient and family to use good hand hygiene technique  - Identify and instruct in appropriate isolation precautions for identified infection/condition  Outcome: Progressing     Problem: SAFETY ADULT  Goal: Patient will remain free of falls  Description: INTERVENTIONS:  - Assess patient frequently for physical needs  -  Identify cognitive and physical deficits and behaviors that affect risk of falls    -  [place] fall precautions as indicated by assessment   - Educate patient/family on patient safety including physical limitations  - Instruct patient to call for assistance with activity based on assessment  - Modify environment to reduce risk of injury  - Consider OT/PT consult to assist with strengthening/mobility  Outcome: Progressing  Goal: Maintain or return to baseline ADL function  Description: INTERVENTIONS:  -  Assess patient's ability to carry out ADLs; assess patient's baseline for ADL function and identify physical deficits which impact ability to perform ADLs (bathing, care of mouth/teeth, toileting, grooming, dressing, etc )  - Assess/evaluate cause of self-care deficits   - Assess range of motion  - Assess patient's mobility; develop plan if impaired  - Assess patient's need for assistive devices and provide as appropriate  - Encourage maximum independence but intervene and supervise when necessary  - Involve family in performance of ADLs  - Assess for home care needs following discharge   - Consider OT consult to assist with ADL evaluation and planning for discharge  - Provide patient education as appropriate  Outcome: Progressing  Goal: Maintain or return mobility status to optimal level  Description: INTERVENTIONS:  - Assess patient's baseline mobility status (ambulation, transfers, stairs, etc )    - Identify cognitive and physical deficits and behaviors that affect mobility  - Identify mobility aids required to assist with transfers and/or ambulation (gait belt, sit-to-stand, lift, walker, cane, etc )  - [place] fall precautions as indicated by assessment  - Record patient progress and toleration of activity level on Mobility SBAR; progress patient to next Phase/Stage  - Instruct patient to call for assistance with activity based on assessment  - Consider rehabilitation consult to assist with strengthening/weightbearing, etc   Outcome: Progressing     Problem: DISCHARGE PLANNING  Goal: Discharge to home or other facility with appropriate resources  Description: INTERVENTIONS:  - Identify barriers to discharge w/patient and caregiver  - Arrange for needed discharge resources and transportation as appropriate  - Identify discharge learning needs (meds, wound care, etc )  - Refer to Case Management Department for coordinating discharge planning if the patient needs post-hospital services based on physician/advanced practitioner order or complex needs related to functional status, cognitive ability, or social support system  Outcome: Progressing     Problem: Knowledge Deficit  Goal: Patient/family/caregiver demonstrates understanding of disease process, treatment plan, medications, and discharge instructions  Description: Complete learning assessment and assess knowledge base  Interventions:  - Provide teaching at level of understanding  - Provide teaching via preferred learning methods  Outcome: Progressing     Problem: GASTROINTESTINAL - ADULT  Goal: Minimal or absence of nausea and/or vomiting  Description: INTERVENTIONS:  - Administer IV fluids if ordered to ensure adequate hydration  - Administer ordered antiemetic medications as needed  - Provide nonpharmacologic comfort measures as appropriate  - Advance diet as tolerated, if ordered  - Consider nutrition services referral to assist patient with adequate nutrition and appropriate food choices  Outcome: Progressing     Problem: METABOLIC, FLUID AND ELECTROLYTES - ADULT  Goal: Electrolytes maintained within normal limits  Description: INTERVENTIONS:  - Monitor labs and assess patient for signs and symptoms of electrolyte imbalances  - Administer electrolyte replacement as ordered  - Monitor response to electrolyte replacements, including repeat lab results as appropriate  - Instruct patient on fluid and nutrition as appropriate  Outcome: Progressing     Problem: Potential for Falls  Goal: Patient will remain free of falls  Description: INTERVENTIONS:  - Assess patient frequently for physical needs  -  Identify cognitive and physical deficits and behaviors that affect risk of falls    -  [place] fall precautions as indicated by assessment   - Educate patient/family on patient safety including physical limitations  - Instruct patient to call for assistance with activity based on assessment  - Modify environment to reduce risk of injury  - Consider OT/PT consult to assist with strengthening/mobility  Outcome: Progressing

## 2020-08-28 NOTE — UTILIZATION REVIEW
Continued Stay Review    Date: 8/28/2020                       Current Patient Class:  Inpatient   Current Level of Care:  Med Surg     HPI:55 y o  male initially admitted on 8/20/2020 with  Acute exacerbation of alcoholic pancreatitis  Assessment/Plan: 8/28: Pt with pancreatitis,   Continues on IVF's and utilizes prn dilaudid  Continue to monitor labs and replete electrolytes as needed       Pertinent Labs/Diagnostic Results:   Results from last 7 days   Lab Units 08/22/20  1021   SARS-COV-2  Negative     Results from last 7 days   Lab Units 08/28/20  0435 08/27/20 0444 08/26/20  0420 08/24/20  0510 08/23/20  0608   WBC Thousand/uL 4 02* 4 32 3 95* 3 72* 3 86*   HEMOGLOBIN g/dL 9 0* 9 1* 8 7* 9 6* 9 2*   HEMATOCRIT % 30 1* 29 1* 28 2* 32 2* 31 0*   PLATELETS Thousands/uL 180 166 121* 114* 90*   NEUTROS ABS Thousands/µL  --  1 69*  --  1 89 2 24         Results from last 7 days   Lab Units 08/28/20  0435 08/27/20 0444 08/26/20  0420 08/24/20  0510 08/23/20  0608   SODIUM mmol/L 129* 130* 126* 131* 131*   POTASSIUM mmol/L 3 7 4 0 4 1 3 7 3 7   CHLORIDE mmol/L 91* 93* 91* 96* 97*   CO2 mmol/L 29 30 30 29 32   ANION GAP mmol/L 9 7 5 6 2*   BUN mg/dL 4* 6 3* 2* 2*   CREATININE mg/dL 0 60 0 71 0 61 0 57* 0 62   EGFR ml/min/1 73sq m 114 106 113 116 112   CALCIUM mg/dL 9 1 9 2 9 1 8 6 7 7*   CALCIUM, IONIZED mmol/L  --  1 19  --  1 10*  --    MAGNESIUM mg/dL 1 6 1 5* 1 3* 1 6 1 5*   PHOSPHORUS mg/dL 3 5 4 7* 4 1 3 1 2 1*     Results from last 7 days   Lab Units 08/28/20  0435 08/27/20  0444 08/26/20  0420 08/24/20  0510 08/23/20  0608   AST U/L 62* 53* 49* 58* 55*   ALT U/L 39 38 38 35 32   ALK PHOS U/L 139* 140* 129* 124* 112   TOTAL PROTEIN g/dL 8 4* 8 0 7 4 7 6 6 9   ALBUMIN g/dL 3 6 3 4* 3 2* 3 4* 3 1*   TOTAL BILIRUBIN mg/dL 0 60 0 70 0 70 0 90 0 80         Results from last 7 days   Lab Units 08/28/20  0435 08/27/20  0444 08/26/20  0420 08/24/20  0510 08/23/20  0608 08/22/20  0424   GLUCOSE RANDOM mg/dL 93 112 108 109 126 74     Results from last 7 days   Lab Units 08/23/20  0608   CK TOTAL U/L 82     Results from last 7 days   Lab Units 08/24/20  0510   TROPONIN I ng/mL <0 02       Results from last 7 days   Lab Units 08/25/20  0518 08/24/20  0510 08/23/20  8538   PROCALCITONIN ng/ml <0 05 <0 05 <0 05     Results from last 7 days   Lab Units 08/23/20  0608   LACTIC ACID mmol/L 1 3     Results from last 7 days   Lab Units 08/23/20  0608   FERRITIN ng/mL 33     Results from last 7 days   Lab Units 08/24/20  0510   HEP B S AG  Non-reactive   HEP C AB  Non-reactive   HEP B C IGM  Non-reactive     Results from last 7 days   Lab Units 08/26/20  0420 08/24/20  0510 08/23/20  0608   LIPASE u/L 203 178 356     Results from last 7 days   Lab Units 08/22/20  0950   CLARITY UA  Clear   COLOR UA  Yellow   SPEC GRAV UA  1 015   PH UA  7 5   GLUCOSE UA mg/dl Negative   KETONES UA mg/dl 15 (1+)*   BLOOD UA  Negative   PROTEIN UA mg/dl Negative   NITRITE UA  Negative   BILIRUBIN UA  Negative   UROBILINOGEN UA E U /dl 1 0   LEUKOCYTES UA  Negative   WBC UA /hpf None Seen   RBC UA /hpf None Seen   BACTERIA UA /hpf None Seen   EPITHELIAL CELLS WET PREP /hpf None Seen     Results from last 7 days   Lab Units 08/22/20  0950   URINE CULTURE  No Growth <1000 cfu/mL     Results from last 7 days   Lab Units 08/26/20  0420   TOTAL COUNTED  100           Vital Signs:   Date/Time   Temp   Pulse   Resp   BP   MAP (mmHg)   SpO2   O2 Device   Patient Position - Orthostatic VS    08/28/20 09:11:34      84      130/91   104   99 %          08/28/20 07:28:24   98 2 °F (36 8 °C)   76   18   122/78   93   100 %   None (Room air)   Lying    08/27/20 22:01:35   98 5 °F (36 9 °C)   80   19   140/87   105   100 %   None (Room air)   Sitting    08/27/20 22:00:34      79   19   140/87   105   100 %          08/27/20 15:19:30   98 2 °F (36 8 °C)         140/86   104             08/27/20 07:41:28   98 3 °F (36 8 °C)   76   16   125/78   94   98 %   None (Room air)   Sitting          Medications:   Scheduled Medications:  cholecalciferol, 1,000 Units, Oral, Daily  cyanocobalamin, 100 mcg, Oral, Daily  docusate sodium, 100 mg, Oral, BID  folic acid, 1 mg, Oral, Daily  heparin (porcine), 5,000 Units, Subcutaneous, Q8H KIMBERLY  levETIRAcetam, 1,000 mg, Oral, Q12H KIMBERLY  lisinopril, 10 mg, Oral, Daily  multivitamin-minerals, 1 tablet, Oral, Daily  nicotine, 1 patch, Transdermal, Daily  pantoprazole, 40 mg, Oral, Early Morning  polyethylene glycol, 17 g, Oral, Daily  thiamine, 100 mg, Oral, Daily      Continuous IV Infusions:  sodium chloride 0 9 % with KCl 20 mEq/L, 50 mL/hr, Intravenous, Continuous      PRN Meds:  albuterol, 2 puff, Inhalation, Q4H PRN  albuterol, 2 5 mg, Nebulization, Q6H PRN  HYDROmorphone, 1 mg, Intravenous, Q3H PRN - 8/27 x 2    Or  HYDROmorphone, 2 mg, Intravenous, Q3H PRN -  8/27 x 3 - 8/28 x 2   Labetalol HCl, 10 mg, Intravenous, Q4H PRN  ondansetron, 4 mg, Intravenous, Q4H PRN  polyethylene glycol, 17 g, Oral, Daily PRN  sodium chloride (PF), 3 mL, Intravenous, Q1H PRN    Nursing Orders - VS - fall precautions - Aspiration precautions - Seizure precautions - SCD's to le's- diet regular house - Lo fat    Discharge Plan: D     Network Utilization Review Department  [email or web address]  ATTENTION: Please call with any questions or concerns to [phone] and carefully listen to the prompts so that you are directed to the right person  All voicemails are confidential   Tavia Peterson all requests for admission clinical reviews, approved or denied determinations and any other requests to dedicated fax number below belonging to the campus where the patient is receiving treatment  List of dedicated fax numbers for the Facilities:  [address] DENIALS (Administrative/Medical Necessity) [phone]   [facility] [address]  (Maternity/NICU/Pediatrics) [phone]   Bria Conklin [phone]     Nahum Showers [phone]   Chris Juan [phone]   [address] [phone]   [facility]  [phone]   [address] [facility]  [address] [address] [address] [facility] [phone]

## 2020-08-28 NOTE — ASSESSMENT & PLAN NOTE
· Still with severe epigastric abdominal pain requiring regular doses of IV dilaudid to achieve adequate pain control  · Continue a low fat diet  · Continue NSS IV fluids with 20 meQ KCl at 50 ml/hr  · Utilize PRN IV dilaudid and PRN IV zofran

## 2020-08-28 NOTE — ASSESSMENT & PLAN NOTE
· Improved  · Likely secondary to alcohol abuse  · Monitor for any signs of bleeding  · Follow the platelet count

## 2020-08-28 NOTE — ASSESSMENT & PLAN NOTE
· Likely secondary to alcohol abuse  · Avoid all hepatotoxic agents  · Follow the LFT's (liver function tests)  · Outpatient follow-up with Gastroenterology    Results for Hermelinda Love (MRN [id number]) as of 8/25/2020 13:13   Ref   Range 8/24/2020 05:10   HEPATITIS A IGM ANTIBODY Latest Ref Range: Non-reactive, Equivocal-Suggest Recollect  Non-reactive   HEPATITIS B SURFACE ANTIGEN Latest Ref Range: Non-reactive, NonReactive - Confirmed  Non-reactive   HEPATITIS B CORE IGM ANTIBODY Latest Ref Range: Non-reactive  Non-reactive   HEPATITIS C ANTIBODY Latest Ref Range: Non-reactive  Non-reactive

## 2020-08-28 NOTE — ASSESSMENT & PLAN NOTE
· Observed on CT scan  · Outpatient Urology evaluation    Results for Rl Garrison (MRN [id number]) as of 8/25/2020 13:13   Ref   Range 8/22/2020 09:50   Color, UA Unknown Yellow   Clarity, UA Unknown Clear   SL AMB SPECIFIC GRAVITY_URINE Latest Ref Range: 1 003 - 1 030  1 015   Glucose, UA Latest Ref Range: Negative mg/dl Negative   Ketones, UA Latest Ref Range: Negative mg/dl 15 (1+) (A)   Blood, UA Latest Ref Range: Negative  Negative   Nitrite, UA Latest Ref Range: Negative  Negative   Leukocytes, UA Latest Ref Range: Negative  Negative   pH, UA Latest Ref Range: 4 5, 5 0, 5 5, 6 0, 6 5, 7 0, 7 5, 8 0  7 5   POCT URINE PROTEIN Latest Ref Range: Negative mg/dl Negative   Bilirubin, UA Latest Ref Range: Negative  Negative   SL AMB POCT UROBILINOGEN Latest Ref Range: 0 2, 1 0 E U /dl E U /dl 1 0   RBC, UA Latest Ref Range: None Seen, 0-5 /hpf None Seen   WBC, UA Latest Ref Range: None Seen, 0-5, 5-55, 5-65 /hpf None Seen   Bacteria, UA Latest Ref Range: None Seen, Occasional /hpf None Seen     08/22/2020 0950  08/23/2020 2255  Urine culture [[id number]]    Urine, Clean Catch     Final result  Component  Value    Urine Culture  No Growth <1000 cfu/mL

## 2020-08-28 NOTE — PROGRESS NOTES
Progress Note - Beth Confer 1965, 48 y o  male MRN: [id number]    Unit/Bed#: 426-01 Encounter: [id number]    Primary Care Provider: Ai Paul PA-C   Date and time admitted to hospital: 8/20/2020  5:22 PM        * Pancreatitis, alcoholic, acute  Assessment & Plan  · Still with severe epigastric abdominal pain requiring regular doses of IV dilaudid to achieve adequate pain control  · Continue a low fat diet  · Continue NSS IV fluids with 20 meQ KCl at 50 ml/hr  · Utilize PRN IV dilaudid and PRN IV zofran    Hypomagnesemia  Assessment & Plan  · Give magnesium sulfate 2 grams IV x 1 dose today  · Follow the magnesium level    Alcohol dependence ([facility] [address] )  Assessment & Plan  · The CIWA protocol has been discontinued  · Continue thiamine 100 mg PO Qdaily, folic acid 1 mg PO Qdaily, and a daily PO multivitamin  · Pt refused alcohol rehab    Left anterior fascicular block  Assessment & Plan  · Outpatient Cardiology evaluation    Lesion of spleen  Assessment & Plan  CT scan of the abdomen/pelvis (08/20/2020):     · SPLEEN:  Calcified granulomata are noted in the spleen  No suspicious splenic mass  Outpatient follow-up with PCP in regards to this matter    Transaminitis  Assessment & Plan  · Likely secondary to alcohol abuse  · Avoid all hepatotoxic agents  · Follow the LFT's (liver function tests)  · Outpatient follow-up with Gastroenterology    Results for Nitza Escobedo (MRN [id number]) as of 8/25/2020 13:13   Ref   Range 8/24/2020 05:10   HEPATITIS A IGM ANTIBODY Latest Ref Range: Non-reactive, Equivocal-Suggest Recollect  Non-reactive   HEPATITIS B SURFACE ANTIGEN Latest Ref Range: Non-reactive, NonReactive - Confirmed  Non-reactive   HEPATITIS B CORE IGM ANTIBODY Latest Ref Range: Non-reactive  Non-reactive   HEPATITIS C ANTIBODY Latest Ref Range: Non-reactive  Non-reactive       Constipation  Assessment & Plan  · Continue colace 100 mg PO BID  · Utilize miralax 17 grams PO Qdaily PRN constipation    Tobacco use  Assessment & Plan  · Nicotine patch  · Smoking cessation counseling    Pancytopenia ([facility] [address] )  Assessment & Plan  · Likely secondary to alcohol abuse  · Follow the CBC  · Outpatient Hematology/Oncology evaluation    Pancreatic mass  Assessment & Plan  · The patient was seen in consultation by Gastroenterology  · Needs an endoscopic ultrasound completed at some point to rule-out malignancy    Results for David Francois (MRN [id number]) as of 8/26/2020 13:13   Ref  Range 8/23/2020 06:08   CA 19-9 Latest Ref Range: 0 - 35 U/mL 1     MRI of the abdomen (08/05/2020): IMPRESSION:     20 mm cystic lesion in the uncinate process of the pancreas shows a short interval increase in size and complexity when compared to July  Relatively rapid evolution probably favors postinflammatory pseudocyst   However, apparent nonenhancing mural nodularity is a worrisome feature warranting gastroenterology referral for endoscopic ultrasound  No high risk features  Celiac artery stenosis (HCC)  Assessment & Plan  · Outpatient Vascular Surgery evaluation    Traore esophagus  Assessment & Plan  · Continue PPI treatment  · Outpatient surveillance imaging with Gastroenterology    Thrombocytopenia ([facility] [address] )  Assessment & Plan  · Improved  · Likely secondary to alcohol abuse  · Monitor for any signs of bleeding  · Follow the platelet count    Hx of seizure disorder  Assessment & Plan  · Continue PO keppra  · Outpatient follow-up with Neurology    Hypokalemia  Assessment & Plan  · Improved with potassium chloride supplementation treatment  · Follow the potassium level    Hypophosphatemia  Assessment & Plan  · Resolved with phosphorus supplementation treatment  · Follow the phosphorus level    Bladder wall thickening  Assessment & Plan  · Observed on CT scan  · Outpatient Urology evaluation    Results for David Francois (MRN [id number]) as of 8/25/2020 13:13   Ref   Range 8/22/2020 09:50   Color, UA Unknown Yellow Clarity, UA Unknown Clear   SL AMB SPECIFIC GRAVITY_URINE Latest Ref Range: 1 003 - 1 030  1 015   Glucose, UA Latest Ref Range: Negative mg/dl Negative   Ketones, UA Latest Ref Range: Negative mg/dl 15 (1+) (A)   Blood, UA Latest Ref Range: Negative  Negative   Nitrite, UA Latest Ref Range: Negative  Negative   Leukocytes, UA Latest Ref Range: Negative  Negative   pH, UA Latest Ref Range: 4 5, 5 0, 5 5, 6 0, 6 5, 7 0, 7 5, 8 0  7 5   POCT URINE PROTEIN Latest Ref Range: Negative mg/dl Negative   Bilirubin, UA Latest Ref Range: Negative  Negative   SL AMB POCT UROBILINOGEN Latest Ref Range: 0 2, 1 0 E U /dl E U /dl 1 0   RBC, UA Latest Ref Range: None Seen, 0-5 /hpf None Seen   WBC, UA Latest Ref Range: None Seen, 0-5, 5-55, 5-65 /hpf None Seen   Bacteria, UA Latest Ref Range: None Seen, Occasional /hpf None Seen     08/22/2020 0950  08/23/2020 2255  Urine culture [[id number]]    Urine, Clean Catch     Final result  Component  Value    Urine Culture  No Growth <1000 cfu/mL             Vitamin D deficiency  Assessment & Plan  · Utilize cholecalciferol 1000 I  U  PO Qdaily, which should be continued upon discharge  · Recheck a vitamin D 25-OH level in 1-2 months with his PCP  Outpatient follow-up with PCP in regards to this matter    Hepatic steatosis  Assessment & Plan  · Likely secondary to alcohol abuse  · Alcohol cessation counseling was given to the patient  · Lifestyle modifications are recommended including weight loss, improving his diet, and increasing his amount of activity    · Avoid all hepatotoxic agents  · Follow the LFT's (liver function tests)  · Outpatient surveillance imaging and follow-up with Gastroenterology    Essential hypertension  Assessment & Plan  · Continue PO lisinopril for now  · Utilize PRN IV labetalol  · Follow the blood pressure trend      VTE Pharmacologic Prophylaxis:   Pharmacologic: Lovenox  Mechanical VTE Prophylaxis in Place: Yes    Patient Centered Rounds: I have performed bedside rounds with nursing staff today  Time Spent for Care: 30 minutes  More than 50% of total time spent on counseling and coordination of care as described above  Current Length of Stay: 7 day(s)    Current Patient Status: Inpatient   Certification Statement: The patient will continue to require additional inpatient hospital stay due to the patient requiring regular doses of IV pain medications to achieve adequate pain control  Code Status: Level 1 - Full Code      Subjective: The patient was seen and examined  The patient continues to experience severe epigastric abdominal pain  Objective:     Vitals:   Temp (24hrs), Av 3 °F (36 8 °C), Min:98 2 °F (36 8 °C), Max:98 5 °F (36 9 °C)    Temp:  [98 2 °F (36 8 °C)-98 5 °F (36 9 °C)] 98 2 °F (36 8 °C)  HR:  [76-84] 84  Resp:  [18-19] 18  BP: (122-140)/(78-91) 130/91  SpO2:  [99 %-100 %] 99 %  Body mass index is 20 53 kg/m²  Input and Output Summary (last 24 hours):        Intake/Output Summary (Last 24 hours) at 2020 1128  Last data filed at 2020 0500  Gross per 24 hour   Intake 1380 ml   Output 200 ml   Net 1180 ml       Physical Exam:     Physical Exam  General:  NAD, follows commands  HEENT:  NC/AT, mucous membranes moist  Neck:  Supple, No JVP elevation  CV:  + S1, + S2, RRR  Pulm:  Lung fields are CTA bilaterally  Abd:  Soft, Epigastric tenderness with palpation, Non-distended  Ext:  No clubbing/cyanosis/edema  Skin:  No rashes  Neuro:  Awake, alert, oriented  Psych:  Normal mood and affect      Additional Data:    Labs:    Results from last 7 days   Lab Units 20  0435 20  0444   WBC Thousand/uL 4 02* 4 32   HEMOGLOBIN g/dL 9 0* 9 1*   HEMATOCRIT % 30 1* 29 1*   PLATELETS Thousands/uL 180 166   NEUTROS PCT %  --  39*   LYMPHS PCT %  --  33   MONOS PCT %  --  24*   EOS PCT %  --  3     Results from last 7 days   Lab Units 20  0435   SODIUM mmol/L 129*   POTASSIUM mmol/L 3 7   CHLORIDE mmol/L 91*   CO2 mmol/L 29 BUN mg/dL 4*   CREATININE mg/dL 0 60   ANION GAP mmol/L 9   CALCIUM mg/dL 9 1   ALBUMIN g/dL 3 6   TOTAL BILIRUBIN mg/dL 0 60   ALK PHOS U/L 139*   ALT U/L 39   AST U/L 62*   GLUCOSE RANDOM mg/dL 93                 Results from last 7 days   Lab Units 08/25/20  0518 08/24/20  0510 08/23/20  9553   LACTIC ACID mmol/L  --   --  1 3   PROCALCITONIN ng/ml <0 05 <0 05 <0 05           * I Have Reviewed All Lab Data Listed Above  * Additional Pertinent Lab Tests Reviewed:  [facility]      Recent Cultures (last 7 days):     Results from last 7 days   Lab Units 08/22/20  0950   URINE CULTURE  No Growth <1000 cfu/mL       Last 24 Hours Medication List:   Current Facility-Administered Medications   Medication Dose Route Frequency Provider Last Rate    albuterol  2 puff Inhalation Q4H PRN Eleazar Brooks MD      albuterol  2 5 mg Nebulization Q6H PRN Eleazar Brooks MD      cholecalciferol  1,000 Units Oral Daily Juan [employer], DO      cyanocobalamin  100 mcg Oral Daily Eleazar Brooks MD      docusate sodium  100 mg Oral BID Juan [employer], DO      folic acid  1 mg Oral Daily Eleazar Brooks MD      heparin (porcine)  5,000 Units Subcutaneous Q8H [address] Jin Tsang MD      HYDROmorphone  1 mg Intravenous Q3H PRN Juan [employer], DO      Or    HYDROmorphone  2 mg Intravenous Q3H PRN Juan [employer], DO      Labetalol HCl  10 mg Intravenous Q4H PRN Juan [employer], DO      levETIRAcetam  1,000 mg Oral Q12H [address] Td Salinas DO      lisinopril  10 mg Oral Daily Juan [employer], DO      magnesium sulfate  2 g Intravenous Once [employer], DO      multivitamin-minerals  1 tablet Oral Daily Eleazar Brooks MD      nicotine  1 patch Transdermal Daily Eleazar Brooks MD      ondansetron  4 mg Intravenous Q4H PRN Eleazar Brooks MD      pantoprazole  40 mg Oral Early Morning Khushbu Navas PA-C      polyethylene glycol  17 g Oral Daily PRN Trinity Blunt DO      polyethylene glycol  17 g Oral Daily Sherrine Prader, PA-C      sodium chloride (PF)  3 mL Intravenous Q1H PRN Yonatan See DO      sodium chloride 0 9 % with KCl 20 mEq/L  50 mL/hr Intravenous Continuous Trinity Blunt DO 50 mL/hr (08/27/20 2133)    thiamine  100 mg Oral Daily Matthew Flores MD          Today, Patient Was Seen By: Trinity Blunt DO    ** Please Note: Dictation voice to text software may have been used in the creation of this document   **

## 2020-08-28 NOTE — ASSESSMENT & PLAN NOTE
· The patient was seen in consultation by Gastroenterology  · Needs an endoscopic ultrasound completed at some point to rule-out malignancy    Results for Valente Cross (MRN [id number]) as of 8/26/2020 13:13   Ref  Range 8/23/2020 06:08   CA 19-9 Latest Ref Range: 0 - 35 U/mL 1     MRI of the abdomen (08/05/2020): IMPRESSION:     20 mm cystic lesion in the uncinate process of the pancreas shows a short interval increase in size and complexity when compared to July  Relatively rapid evolution probably favors postinflammatory pseudocyst   However, apparent nonenhancing mural nodularity is a worrisome feature warranting gastroenterology referral for endoscopic ultrasound  No high risk features

## 2020-08-29 VITALS
HEART RATE: 101 BPM | DIASTOLIC BLOOD PRESSURE: 75 MMHG | SYSTOLIC BLOOD PRESSURE: 110 MMHG | TEMPERATURE: 98 F | WEIGHT: 127.21 LBS | BODY MASS INDEX: 20.44 KG/M2 | RESPIRATION RATE: 19 BRPM | HEIGHT: 66 IN | OXYGEN SATURATION: 99 %

## 2020-08-29 PROBLEM — R94.31 ABNORMAL EKG: Status: ACTIVE | Noted: 2020-08-29

## 2020-08-29 LAB
ALBUMIN SERPL BCP-MCNC: 3.4 G/DL (ref 3.5–5)
ALP SERPL-CCNC: 141 U/L (ref 46–116)
ALT SERPL W P-5'-P-CCNC: 44 U/L (ref 12–78)
ANION GAP SERPL CALCULATED.3IONS-SCNC: 6 MMOL/L (ref 4–13)
ANISOCYTOSIS BLD QL SMEAR: PRESENT
AST SERPL W P-5'-P-CCNC: 72 U/L (ref 5–45)
BASOPHILS # BLD MANUAL: 0 THOUSAND/UL (ref 0–0.1)
BASOPHILS NFR MAR MANUAL: 0 % (ref 0–1)
BILIRUB SERPL-MCNC: 0.6 MG/DL (ref 0.2–1)
BUN SERPL-MCNC: 4 MG/DL (ref 5–25)
CALCIUM SERPL-MCNC: 9.2 MG/DL (ref 8.3–10.1)
CHLORIDE SERPL-SCNC: 95 MMOL/L (ref 100–108)
CO2 SERPL-SCNC: 30 MMOL/L (ref 21–32)
CREAT SERPL-MCNC: 0.6 MG/DL (ref 0.6–1.3)
EOSINOPHIL # BLD MANUAL: 0 THOUSAND/UL (ref 0–0.4)
EOSINOPHIL NFR BLD MANUAL: 0 % (ref 0–6)
ERYTHROCYTE [DISTWIDTH] IN BLOOD BY AUTOMATED COUNT: 18.1 % (ref 11.6–15.1)
GFR SERPL CREATININE-BSD FRML MDRD: 114 ML/MIN/1.73SQ M
GLUCOSE SERPL-MCNC: 87 MG/DL (ref 65–140)
HCT VFR BLD AUTO: 30.6 % (ref 36.5–49.3)
HGB BLD-MCNC: 9.3 G/DL (ref 12–17)
HYPERCHROMIA BLD QL SMEAR: PRESENT
LG PLATELETS BLD QL SMEAR: PRESENT
LIPASE SERPL-CCNC: 331 U/L (ref 73–393)
LYMPHOCYTES # BLD AUTO: 1.32 THOUSAND/UL (ref 0.6–4.47)
LYMPHOCYTES # BLD AUTO: 33 % (ref 14–44)
MAGNESIUM SERPL-MCNC: 1.6 MG/DL (ref 1.6–2.6)
MCH RBC QN AUTO: 24.5 PG (ref 26.8–34.3)
MCHC RBC AUTO-ENTMCNC: 30.4 G/DL (ref 31.4–37.4)
MCV RBC AUTO: 81 FL (ref 82–98)
MONOCYTES # BLD AUTO: 0.44 THOUSAND/UL (ref 0–1.22)
MONOCYTES NFR BLD: 11 % (ref 4–12)
NEUTROPHILS # BLD MANUAL: 2.23 THOUSAND/UL (ref 1.85–7.62)
NEUTS SEG NFR BLD AUTO: 56 % (ref 43–75)
NRBC BLD AUTO-RTO: 0 /100 WBCS
PHOSPHATE SERPL-MCNC: 4.4 MG/DL (ref 2.7–4.5)
PLATELET # BLD AUTO: 212 THOUSANDS/UL (ref 149–390)
PLATELET BLD QL SMEAR: ADEQUATE
PMV BLD AUTO: 10.6 FL (ref 8.9–12.7)
POTASSIUM SERPL-SCNC: 4.1 MMOL/L (ref 3.5–5.3)
PROT SERPL-MCNC: 8.2 G/DL (ref 6.4–8.2)
RBC # BLD AUTO: 3.79 MILLION/UL (ref 3.88–5.62)
SODIUM SERPL-SCNC: 131 MMOL/L (ref 136–145)
TOTAL CELLS COUNTED SPEC: 100
WBC # BLD AUTO: 3.99 THOUSAND/UL (ref 4.31–10.16)

## 2020-08-29 PROCEDURE — 85027 COMPLETE CBC AUTOMATED: CPT | Performed by: INTERNAL MEDICINE

## 2020-08-29 PROCEDURE — 80053 COMPREHEN METABOLIC PANEL: CPT | Performed by: INTERNAL MEDICINE

## 2020-08-29 PROCEDURE — 84100 ASSAY OF PHOSPHORUS: CPT | Performed by: INTERNAL MEDICINE

## 2020-08-29 PROCEDURE — 83735 ASSAY OF MAGNESIUM: CPT | Performed by: INTERNAL MEDICINE

## 2020-08-29 PROCEDURE — 85007 BL SMEAR W/DIFF WBC COUNT: CPT | Performed by: INTERNAL MEDICINE

## 2020-08-29 PROCEDURE — 83690 ASSAY OF LIPASE: CPT | Performed by: INTERNAL MEDICINE

## 2020-08-29 PROCEDURE — 99239 HOSP IP/OBS DSCHRG MGMT >30: CPT | Performed by: INTERNAL MEDICINE

## 2020-08-29 RX ORDER — MAGNESIUM SULFATE HEPTAHYDRATE 40 MG/ML
2 INJECTION, SOLUTION INTRAVENOUS ONCE
Status: COMPLETED | OUTPATIENT
Start: 2020-08-29 | End: 2020-08-29

## 2020-08-29 RX ORDER — DOCUSATE SODIUM 100 MG/1
100 CAPSULE, LIQUID FILLED ORAL 2 TIMES DAILY PRN
Qty: 30 CAPSULE | Refills: 0 | Status: SHIPPED | OUTPATIENT
Start: 2020-08-29

## 2020-08-29 RX ORDER — PANTOPRAZOLE SODIUM 40 MG/1
40 TABLET, DELAYED RELEASE ORAL
Qty: 60 TABLET | Refills: 0
Start: 2020-08-29 | End: 2020-10-19

## 2020-08-29 RX ORDER — OXYCODONE HYDROCHLORIDE 5 MG/1
5 TABLET ORAL EVERY 4 HOURS PRN
Qty: 5 TABLET | Refills: 0 | Status: ON HOLD | OUTPATIENT
Start: 2020-08-29 | End: 2020-11-28 | Stop reason: SDUPTHER

## 2020-08-29 RX ORDER — FOLIC ACID 1 MG/1
1 TABLET ORAL DAILY
Qty: 30 TABLET | Refills: 0 | Status: SHIPPED | OUTPATIENT
Start: 2020-08-29

## 2020-08-29 RX ORDER — LANOLIN ALCOHOL/MO/W.PET/CERES
100 CREAM (GRAM) TOPICAL DAILY
Qty: 30 TABLET | Refills: 0 | Status: SHIPPED | OUTPATIENT
Start: 2020-08-29

## 2020-08-29 RX ADMIN — FOLIC ACID 1 MG: 1 TABLET ORAL at 09:01

## 2020-08-29 RX ADMIN — THIAMINE HCL TAB 100 MG 100 MG: 100 TAB at 09:01

## 2020-08-29 RX ADMIN — DOCUSATE SODIUM 100 MG: 100 CAPSULE, LIQUID FILLED ORAL at 09:01

## 2020-08-29 RX ADMIN — LISINOPRIL 10 MG: 10 TABLET ORAL at 09:01

## 2020-08-29 RX ADMIN — VITAMIN D, TAB 1000IU (100/BT) 1000 UNITS: 25 TAB at 09:01

## 2020-08-29 RX ADMIN — PANTOPRAZOLE SODIUM 40 MG: 40 TABLET, DELAYED RELEASE ORAL at 05:28

## 2020-08-29 RX ADMIN — LEVETIRACETAM 1000 MG: 500 TABLET ORAL at 09:01

## 2020-08-29 RX ADMIN — Medication 100 MCG: at 09:01

## 2020-08-29 RX ADMIN — MAGNESIUM SULFATE IN WATER 2 G: 40 INJECTION, SOLUTION INTRAVENOUS at 09:03

## 2020-08-29 RX ADMIN — HYDROMORPHONE HYDROCHLORIDE 1 MG: 1 INJECTION, SOLUTION INTRAMUSCULAR; INTRAVENOUS; SUBCUTANEOUS at 04:31

## 2020-08-29 RX ADMIN — MULTIPLE VITAMINS W/ MINERALS TAB 1 TABLET: TAB at 09:01

## 2020-08-29 NOTE — ASSESSMENT & PLAN NOTE
· Likely secondary to alcohol abuse  · Follow the CBC after discharge with his PCP  · Outpatient Hematology/Oncology evaluation

## 2020-08-29 NOTE — ASSESSMENT & PLAN NOTE
· Resolved with potassium chloride supplementation treatment  · Follow the potassium level after discharge with his PCP

## 2020-08-29 NOTE — ASSESSMENT & PLAN NOTE
· Utilize cholecalciferol 1000 I  U  PO Qdaily, which will be continued upon discharge  · Recheck a vitamin D 25-OH level in 1-2 months with his PCP  Outpatient follow-up with PCP in regards to this matter

## 2020-08-29 NOTE — PLAN OF CARE
Problem: PAIN - ADULT  Goal: Verbalizes/displays adequate comfort level or baseline comfort level  Description: Interventions:  - Encourage patient to monitor pain and request assistance  - Assess pain using appropriate pain scale  - Administer analgesics based on type and severity of pain and evaluate response  - Implement non-pharmacological measures as appropriate and evaluate response  - Consider cultural and social influences on pain and pain management  - Notify physician/advanced practitioner if interventions unsuccessful or patient reports new pain  Outcome: Adequate for Discharge     Problem: INFECTION - ADULT  Goal: Absence or prevention of progression during hospitalization  Description: INTERVENTIONS:  - Assess and monitor for signs and symptoms of infection  - Monitor lab/diagnostic results  - Monitor all insertion sites, i e  indwelling lines, tubes, and drains  - Monitor endotracheal if appropriate and nasal secretions for changes in amount and color  - [place] appropriate cooling/warming therapies per order  - Administer medications as ordered  - Instruct and encourage patient and family to use good hand hygiene technique  - Identify and instruct in appropriate isolation precautions for identified infection/condition  Outcome: Adequate for Discharge     Problem: SAFETY ADULT  Goal: Patient will remain free of falls  Description: INTERVENTIONS:  - Assess patient frequently for physical needs  -  Identify cognitive and physical deficits and behaviors that affect risk of falls    -  [place] fall precautions as indicated by assessment   - Educate patient/family on patient safety including physical limitations  - Instruct patient to call for assistance with activity based on assessment  - Modify environment to reduce risk of injury  - Consider OT/PT consult to assist with strengthening/mobility  Outcome: Adequate for Discharge  Goal: Maintain or return to baseline ADL function  Description: INTERVENTIONS:  -  Assess patient's ability to carry out ADLs; assess patient's baseline for ADL function and identify physical deficits which impact ability to perform ADLs (bathing, care of mouth/teeth, toileting, grooming, dressing, etc )  - Assess/evaluate cause of self-care deficits   - Assess range of motion  - Assess patient's mobility; develop plan if impaired  - Assess patient's need for assistive devices and provide as appropriate  - Encourage maximum independence but intervene and supervise when necessary  - Involve family in performance of ADLs  - Assess for home care needs following discharge   - Consider OT consult to assist with ADL evaluation and planning for discharge  - Provide patient education as appropriate  Outcome: Adequate for Discharge  Goal: Maintain or return mobility status to optimal level  Description: INTERVENTIONS:  - Assess patient's baseline mobility status (ambulation, transfers, stairs, etc )    - Identify cognitive and physical deficits and behaviors that affect mobility  - Identify mobility aids required to assist with transfers and/or ambulation (gait belt, sit-to-stand, lift, walker, cane, etc )  - [place] fall precautions as indicated by assessment  - Record patient progress and toleration of activity level on Mobility SBAR; progress patient to next Phase/Stage  - Instruct patient to call for assistance with activity based on assessment  - Consider rehabilitation consult to assist with strengthening/weightbearing, etc   Outcome: Adequate for Discharge     Problem: DISCHARGE PLANNING  Goal: Discharge to home or other facility with appropriate resources  Description: INTERVENTIONS:  - Identify barriers to discharge w/patient and caregiver  - Arrange for needed discharge resources and transportation as appropriate  - Identify discharge learning needs (meds, wound care, etc )  - Refer to Case Management Department for coordinating discharge planning if the patient needs post-hospital services based on physician/advanced practitioner order or complex needs related to functional status, cognitive ability, or social support system  Outcome: Adequate for Discharge     Problem: Knowledge Deficit  Goal: Patient/family/caregiver demonstrates understanding of disease process, treatment plan, medications, and discharge instructions  Description: Complete learning assessment and assess knowledge base  Interventions:  - Provide teaching at level of understanding  - Provide teaching via preferred learning methods  Outcome: Adequate for Discharge     Problem: GASTROINTESTINAL - ADULT  Goal: Minimal or absence of nausea and/or vomiting  Description: INTERVENTIONS:  - Administer IV fluids if ordered to ensure adequate hydration  - Administer ordered antiemetic medications as needed  - Provide nonpharmacologic comfort measures as appropriate  - Advance diet as tolerated, if ordered  - Consider nutrition services referral to assist patient with adequate nutrition and appropriate food choices  Outcome: Adequate for Discharge     Problem: METABOLIC, FLUID AND ELECTROLYTES - ADULT  Goal: Electrolytes maintained within normal limits  Description: INTERVENTIONS:  - Monitor labs and assess patient for signs and symptoms of electrolyte imbalances  - Administer electrolyte replacement as ordered  - Monitor response to electrolyte replacements, including repeat lab results as appropriate  - Instruct patient on fluid and nutrition as appropriate  Outcome: Adequate for Discharge     Problem: Potential for Falls  Goal: Patient will remain free of falls  Description: INTERVENTIONS:  - Assess patient frequently for physical needs  -  Identify cognitive and physical deficits and behaviors that affect risk of falls    -  [place] fall precautions as indicated by assessment   - Educate patient/family on patient safety including physical limitations  - Instruct patient to call for assistance with activity based on assessment  - Modify environment to reduce risk of injury  - Consider OT/PT consult to assist with strengthening/mobility  Outcome: Adequate for Discharge

## 2020-08-29 NOTE — ASSESSMENT & PLAN NOTE
· Observed on CT scan  · Outpatient Urology evaluation    Results for Dante oTm (MRN [id number]) as of 8/25/2020 13:13   Ref   Range 8/22/2020 09:50   Color, UA Unknown Yellow   Clarity, UA Unknown Clear   SL AMB SPECIFIC GRAVITY_URINE Latest Ref Range: 1 003 - 1 030  1 015   Glucose, UA Latest Ref Range: Negative mg/dl Negative   Ketones, UA Latest Ref Range: Negative mg/dl 15 (1+) (A)   Blood, UA Latest Ref Range: Negative  Negative   Nitrite, UA Latest Ref Range: Negative  Negative   Leukocytes, UA Latest Ref Range: Negative  Negative   pH, UA Latest Ref Range: 4 5, 5 0, 5 5, 6 0, 6 5, 7 0, 7 5, 8 0  7 5   POCT URINE PROTEIN Latest Ref Range: Negative mg/dl Negative   Bilirubin, UA Latest Ref Range: Negative  Negative   SL AMB POCT UROBILINOGEN Latest Ref Range: 0 2, 1 0 E U /dl E U /dl 1 0   RBC, UA Latest Ref Range: None Seen, 0-5 /hpf None Seen   WBC, UA Latest Ref Range: None Seen, 0-5, 5-55, 5-65 /hpf None Seen   Bacteria, UA Latest Ref Range: None Seen, Occasional /hpf None Seen     08/22/2020 0950  08/23/2020 2255  Urine culture [[id number]]    Urine, Clean Catch     Final result  Component  Value    Urine Culture  No Growth <1000 cfu/mL

## 2020-08-29 NOTE — DISCHARGE SUMMARY
Discharge- Lexie Cough 1965, 48 y o  male MRN: [id number]    Unit/Bed#: 426-01 Encounter: [id number]    Primary Care Provider: Viviana John PA-C   Date and time admitted to hospital: 8/20/2020  5:22 PM        * Pancreatitis, alcoholic, acute  Assessment & Plan  · Initially treated with NPO status, continuous IV fluids, and PRN IV dilaudid  · The patient was seen in consultation by Gastroenterology  · The patient was tolerating a low-fat, solid food diet by the time of discharge  · His abdominal pain greatly improved by the time of discharge  · Outpatient follow-up with Gastroenterology    Hypomagnesemia  Assessment & Plan  · Treated with IV magnesium sulfate replacement therapy during the hospitalization  · Discharge home on magnesium oxide 400 mg PO BID for 7 days  · Recheck a magnesium level next week with his PCP to see if he needs any additional magnesium replacement therapy    Alcohol dependence ([facility] [address] )  Assessment & Plan  · The CIWA protocol was initially utilized during the hospitalization    · Continue thiamine 100 mg PO Qdaily, folic acid 1 mg PO Qdaily, and a daily PO multivitamin  · Pt refused alcohol rehab    Abnormal EKG  Assessment & Plan  · Outpatient Cardiology evaluation    ECG 12 lead   Order: [id number]   Status:  Final result   Visible to patient:  No (inaccessible in [address])   Next appt:  09/11/2020 at 09:00 AM in Gastroenterology Efe Zavala PA-C)    Ref Range & Units  8/20/20 1730   Ventricular Rate  BPM  86    Atrial Rate  BPM  86    IN Interval  ms  154    QRSD Interval  ms  94    QT Interval  ms  396    QTC Interval  ms  473    P Axis  degrees  72    QRS Axis  degrees  -50    T Wave Axis  degrees  54       Narrative & Impression     Normal sinus rhythm  Left anterior fascicular block  Septal infarct , age undetermined  Abnormal ECG  When compared with ECG of 21-JUL-2020 11:49,  No significant change was found  Confirmed by rGacie Perla ([phone]) on 8/21/2020 8:20:00 AM Specimen Collected: 08/20/20 17:30    Last Resulted: 08/21/20 08:20                Left anterior fascicular block  Assessment & Plan  · Outpatient Cardiology evaluation    Lesion of spleen  Assessment & Plan  CT scan of the abdomen/pelvis (08/20/2020):     · SPLEEN:  Calcified granulomata are noted in the spleen  No suspicious splenic mass  Outpatient follow-up with PCP in regards to this matter    Transaminitis  Assessment & Plan  · Likely secondary to alcohol abuse  · Avoid all hepatotoxic agents  · Follow the LFT's (liver function tests) after discharge  · Outpatient follow-up with Gastroenterology    Results for Jef Fuentes (MRN [id number]) as of 8/25/2020 13:13   Ref  Range 8/24/2020 05:10   HEPATITIS A IGM ANTIBODY Latest Ref Range: Non-reactive, Equivocal-Suggest Recollect  Non-reactive   HEPATITIS B SURFACE ANTIGEN Latest Ref Range: Non-reactive, NonReactive - Confirmed  Non-reactive   HEPATITIS B CORE IGM ANTIBODY Latest Ref Range: Non-reactive  Non-reactive   HEPATITIS C ANTIBODY Latest Ref Range: Non-reactive  Non-reactive       Constipation  Assessment & Plan  · Likely secondary to opiate treatment during the hospitalization  · Continue colace 100 mg PO BID  · Utilize miralax 17 grams PO Qdaily PRN constipation  Outpatient follow-up with PCP in regards to this matter    Tobacco use  Assessment & Plan  · A nicotine patch was utilized during the hospitalization  · Smoking cessation counseling was given to the patient during the hospitalization  Pancytopenia ([address] )  Assessment & Plan  · Likely secondary to alcohol abuse  · Follow the CBC after discharge with his PCP  · Outpatient Hematology/Oncology evaluation    Pancreatic mass  Assessment & Plan  · The patient was seen in consultation by Gastroenterology    · Needs an endoscopic ultrasound completed by Gastroenterology as soon as possible as an outpatient to rule-out malignancy    Results for Jef Fuentes (MRN [id number]) as of 8/26/2020 13:13   Ref  Range 8/23/2020 06:08   CA 19-9 Latest Ref Range: 0 - 35 U/mL 1     MRI of the abdomen (08/05/2020): IMPRESSION:     20 mm cystic lesion in the uncinate process of the pancreas shows a short interval increase in size and complexity when compared to July  Relatively rapid evolution probably favors postinflammatory pseudocyst   However, apparent nonenhancing mural nodularity is a worrisome feature warranting gastroenterology referral for endoscopic ultrasound  No high risk features  Celiac artery stenosis [facility])  Assessment & Plan  · Outpatient Vascular Surgery evaluation    Traore esophagus  Assessment & Plan  · Continue PPI treatment  · Outpatient surveillance imaging with Gastroenterology    Tubular adenoma  Assessment & Plan  · Outpatient surveillance with Gastroenterology    Thrombocytopenia ([address] )  Assessment & Plan  · Resolved during the hospitalization  · Likely secondary to alcohol abuse  · Monitor for any signs of bleeding  · Follow the platelet count after discharge with his PCP  · Outpatient Hematology/Oncology evaluation    Hx of seizure disorder  Assessment & Plan  · Continue PO keppra  · Outpatient follow-up with Neurology    Hypokalemia  Assessment & Plan  · Resolved with potassium chloride supplementation treatment  · Follow the potassium level after discharge with his PCP    Hypophosphatemia  Assessment & Plan  · Resolved with phosphorus supplementation treatment  · Follow the phosphorus level after discharge with his PCP    Bladder wall thickening  Assessment & Plan  · Observed on CT scan  · Outpatient Urology evaluation    Results for Angelo Gillespie (MRN [id number]) as of 8/25/2020 13:13   Ref   Range 8/22/2020 09:50   Color, UA Unknown Yellow   Clarity, UA Unknown Clear   SL AMB SPECIFIC GRAVITY_URINE Latest Ref Range: 1 003 - 1 030  1 015   Glucose, UA Latest Ref Range: Negative mg/dl Negative   Ketones, UA Latest Ref Range: Negative mg/dl 15 (1+) (A)   Blood, UA Latest Ref Range: Negative  Negative   Nitrite, UA Latest Ref Range: Negative  Negative   Leukocytes, UA Latest Ref Range: Negative  Negative   pH, UA Latest Ref Range: 4 5, 5 0, 5 5, 6 0, 6 5, 7 0, 7 5, 8 0  7 5   POCT URINE PROTEIN Latest Ref Range: Negative mg/dl Negative   Bilirubin, UA Latest Ref Range: Negative  Negative   SL AMB POCT UROBILINOGEN Latest Ref Range: 0 2, 1 0 E U /dl E U /dl 1 0   RBC, UA Latest Ref Range: None Seen, 0-5 /hpf None Seen   WBC, UA Latest Ref Range: None Seen, 0-5, 5-55, 5-65 /hpf None Seen   Bacteria, UA Latest Ref Range: None Seen, Occasional /hpf None Seen     08/22/2020 0950  08/23/2020 6975  Urine culture [[id number]]    Urine, Clean Catch     Final result  Component  Value    Urine Culture  No Growth <1000 cfu/mL             Vitamin D deficiency  Assessment & Plan  · Utilize cholecalciferol 1000 I  U  PO Qdaily, which will be continued upon discharge  · Recheck a vitamin D 25-OH level in 1-2 months with his PCP  Outpatient follow-up with PCP in regards to this matter    Hepatic steatosis  Assessment & Plan  · Likely secondary to alcohol abuse  · Alcohol cessation counseling was given to the patient  · Lifestyle modifications are recommended including weight loss, improving his diet, and increasing his amount of activity    · Avoid all hepatotoxic agents  · Follow the LFT's (liver function tests) after discharge with his PCP  · Outpatient surveillance imaging and follow-up with Gastroenterology    Essential hypertension  Assessment & Plan  · Continue PO lisinopril for now  Outpatient follow-up with PCP in regards to this matter        Discharging Physician / Practitioner: Shayna Lange DO  PCP: Lizbeth Sheets PA-C  Admission Date:   Admission Orders (From admission, onward)     Ordered        08/21/20 0902  Inpatient Admission  Once         08/20/20 2127  Place in Observation  Once                   Discharge Date: 08/29/20    Consultations During [facility] [place] Stay:  · Gastroenterology    Procedures Performed:   · None    Significant Findings / Test Results:   Cta Chest Ct Abdomen Pelvis W Contrast    Result Date: 8/20/2020  Impression: 1  No acute pulmonary embolism  2   Acute pancreatitis  3   Pancreatic head cysts similar in size compared to the prior MRI  While these may represent pseudocysts, there were suspicious features reported on MRI and endoscopic ultrasound was recommended  4   Bladder wall thickening with bilateral ureteral fullness of uncertain etiology, not present previously  No hydronephrosis  Correlation with urinalysis recommended  5   Diffuse hepatic steatosis 6  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 with poststenotic dilatation  The study was marked in [facility] for immediate notification   Workstation performed: [id number]     ECG 12 lead   Order: [id number]   Status:  Final result   Visible to patient:  No (inaccessible in MyChart)   Next appt:  09/11/2020 at 09:00 AM in Gastroenterology Janki Yoder PA-C)    Ref Range & Units  8/20/20 1730   Ventricular Rate  BPM  86    Atrial Rate  BPM  86    [place] Interval  ms  154    QRSD Interval  ms  94    QT Interval  ms  396    QTC Interval  ms  473    P Axis  degrees  72    QRS Axis  degrees  -50    T Wave Axis  degrees  54       Narrative & Impression     Normal sinus rhythm  Left anterior fascicular block  Septal infarct , age undetermined  Abnormal ECG  When compared with ECG of 21-JUL-2020 11:49,  No significant change was found  Confirmed by Micaela Duke [phone]) on 8/21/2020 8:20:00 AM      Specimen Collected: 08/20/20 17:30    Last Resulted: 08/21/20 08:20            Microbiology Results (last 21 days)     Collected  Updated  Procedure  Result Status     08/22/2020 1021  08/22/2020 1129  [facility]Covid-[facility] [[id number]]     Nares from Nose     Final result  Component  Value    EXT SARS-COV-2  Negative               08/22/2020 0950  08/23/2020 2255  Urine culture [[id number]] Urine, Clean Catch     Final result  Component  Value    Urine Culture  No Growth <1000 cfu/mL                 Results from last 7 days   Lab Units 08/29/20  0440 08/28/20  0435 08/27/20  0444   WBC Thousand/uL 3 99* 4 02* 4 32   HEMOGLOBIN g/dL 9 3* 9 0* 9 1*   HEMATOCRIT % 30 6* 30 1* 29 1*   PLATELETS Thousands/uL 212 180 166     Results from last 7 days   Lab Units 08/29/20  0440 08/28/20 0435 08/27/20  0444   SODIUM mmol/L 131* 129* 130*   POTASSIUM mmol/L 4 1 3 7 4 0   CHLORIDE mmol/L 95* 91* 93*   CO2 mmol/L 30 29 30   BUN mg/dL 4* 4* 6   CREATININE mg/dL 0 60 0 60 0 71   CALCIUM mg/dL 9 2 9 1 9 2     Results from last 7 days   Lab Units 08/29/20  0440 08/28/20 0435 08/27/20  0444   ALK PHOS U/L 141* 139* 140*   ALT U/L 44 39 38   AST U/L 72* 62* 53*       Test Results Pending at Discharge (will require follow up): · None     Outpatient Tests Requested:  · CBC with diff , CMP, Magnesium level, Phosphorus level, and Lipase level in 3-5 days next week with his PCP    Complications:  None    Reason for Admission:  Acute pancreatitis    Hospital Course:     Lexie Coleman is a 48 y o  male patient who originally presented to the hospital on 8/20/2020 due to intractable abdominal pain  Please see above list of diagnoses and related plan for additional information  Condition at Discharge: good     Discharge Day Visit / Exam:     Subjective: The patient was seen and examined  The patient is doing better  The abdominal pain is greatly improved  No nausea or vomiting    Vitals: Blood Pressure: 110/75 (08/29/20 0840)  Pulse: 101 (08/29/20 0840)  Temperature: 98 °F (36 7 °C) (08/29/20 0840)  Temp Source: Oral (08/29/20 0840)  Respirations: 19 (08/29/20 0840)  Height: 5' 6" (167 6 cm) (08/20/20 2150)  Weight - Scale: 57 7 kg (127 lb 3 3 oz) (08/20/20 2150)  SpO2: 99 % (08/29/20 0840)  Exam:   Physical Exam  General:  NAD, follows commands  HEENT:  NC/AT, mucous membranes moist  Neck:  Supple, No JVP elevation  CV:  + S1, + S2, Tachycardic, Regular rhythm  Pulm:  Lung fields are CTA bilaterally  Abd:  Soft, Mild epigastric tenderness with palpation, Non-distended  Ext:  No clubbing/cyanosis/edema  Skin:  No rashes  Neuro:  Awake, alert, oriented  Psych:  Normal mood and affect    Discharge instructions/Information to patient and family:  See after visit summary for information provided to patient and family  Provisions for Follow-Up Care:  See after visit summary for information related to follow-up care and any pertinent home health orders  Disposition:     Home     Planned Readmission:  No     Discharge Statement:  I spent 35 minutes discharging the patient  This time was spent on the day of discharge  I had direct contact with the patient on the day of discharge  Greater than 50% of the total time was spent examining patient, answering all patient questions, arranging and discussing plan of care with patient as well as directly providing post-discharge instructions  Additional time then spent on discharge activities  Discharge Medications:  See after visit summary for reconciled discharge medications provided to patient and family        ** Please Note: This note has been constructed using a voice recognition system **

## 2020-08-29 NOTE — ASSESSMENT & PLAN NOTE
· Resolved during the hospitalization  · Likely secondary to alcohol abuse  · Monitor for any signs of bleeding  · Follow the platelet count after discharge with his PCP  · Outpatient Hematology/Oncology evaluation

## 2020-08-29 NOTE — ASSESSMENT & PLAN NOTE
· Initially treated with NPO status, continuous IV fluids, and PRN IV dilaudid  · The patient was seen in consultation by Gastroenterology  · The patient was tolerating a low-fat, solid food diet by the time of discharge  · His abdominal pain greatly improved by the time of discharge    · Outpatient follow-up with Gastroenterology

## 2020-08-29 NOTE — ASSESSMENT & PLAN NOTE
· Resolved with phosphorus supplementation treatment  · Follow the phosphorus level after discharge with his PCP

## 2020-08-29 NOTE — ASSESSMENT & PLAN NOTE
· Treated with IV magnesium sulfate replacement therapy during the hospitalization  · Discharge home on magnesium oxide 400 mg PO BID for 7 days  · Recheck a magnesium level next week with his PCP to see if he needs any additional magnesium replacement therapy

## 2020-08-29 NOTE — ASSESSMENT & PLAN NOTE
· Outpatient Cardiology evaluation    ECG 12 lead   Order: [id number]   Status:  Final result   Visible to patient:  No (inaccessible in [address])   Next appt:  09/11/2020 at 09:00 AM in Gastroenterology Army JJ Weir)    Ref Range & Units  8/20/20 1730   Ventricular Rate  BPM  86    Atrial Rate  BPM  86    [place] Interval  ms  154    QRSD Interval  ms  94    QT Interval  ms  396    QTC Interval  ms  473    P Axis  degrees  72    QRS Axis  degrees  -50    T Wave Axis  degrees  54       Narrative & Impression     Normal sinus rhythm  Left anterior fascicular block  Septal infarct , age undetermined  Abnormal ECG  When compared with ECG of 21-JUL-2020 11:49,  No significant change was found  Confirmed by Jessika Service ([phone]) on 8/21/2020 8:20:00 AM      Specimen Collected: 08/20/20 17:30    Last Resulted: 08/21/20 08:20

## 2020-08-29 NOTE — ASSESSMENT & PLAN NOTE
· Likely secondary to alcohol abuse  · Alcohol cessation counseling was given to the patient  · Lifestyle modifications are recommended including weight loss, improving his diet, and increasing his amount of activity    · Avoid all hepatotoxic agents  · Follow the LFT's (liver function tests) after discharge with his PCP  · Outpatient surveillance imaging and follow-up with Gastroenterology

## 2020-08-29 NOTE — INCIDENTAL FINDINGS
The following findings require follow-up:  Radiographic finding   Finding:  CT scan of the chest/abdomen/pelvis (08/20/2020): IMPRESSION:     1  No acute pulmonary embolism      2  Acute pancreatitis      3  Pancreatic head cysts similar in size compared to the prior MRI  While these may represent pseudocysts, there were suspicious features reported on MRI and endoscopic ultrasound was recommended      4   Bladder wall thickening with bilateral ureteral fullness of uncertain etiology, not present previously  No hydronephrosis  Correlation with urinalysis recommended      5  Diffuse hepatic steatosis     6   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 with poststenotic dilatation  Follow-up required:  Yes   Follow-up should be done within 1 week(s)    Please notify the following clinician to assist with the follow-up:   The patient needs follow-up with his PCP, with Gastroenterology for an endoscopic ultrasound and biopsy of the pancreatic mass, with Urology for the bladder wall thickening, and with Vascular Surgery for the celiac artery stenosis

## 2020-08-29 NOTE — DISCHARGE INSTRUCTIONS
Pancreatitis   WHAT YOU NEED TO KNOW:   What is pancreatitis? Pancreatitis is inflammation of your pancreas  The pancreas is an organ that makes insulin  It also makes enzymes (digestive juices) that help your body digest food  Pancreatitis may be an acute (short-term) problem that happens only once  It may become a chronic (long-term) problem that comes and goes over time  What causes pancreatitis? Pancreatitis is usually caused by alcohol or gallstones  Less common causes are certain medicines, an injury to the abdomen, some procedures, and infections  High levels of triglycerides (fats) and calcium may also cause pancreatitis  What are the signs and symptoms of pancreatitis? · Severe burning, stabbing, or aching pain that starts in the top of your abdomen and spreads to your back    · Fever    · Nausea and vomiting    · Abdomen that is tender to the touch    · Weight loss without trying  How is pancreatitis diagnosed? Your healthcare provider will examine you and ask about your symptoms  You may need any of the following:  · Blood tests  may show infection, pancreas function, or provide information about your overall health  · An x-ray, ultrasound, or CT  may show the cause of your pancreatitis and help healthcare providers plan your treatment  You may be given contrast liquid to help your pancreas show up better in the pictures  Tell the healthcare provider if you have ever had an allergic reaction to contrast liquid  · Endoscopic retrograde cholangiopancreatography (ERCP)  is a procedure used to find stones, tumors, or narrowed bile ducts  A long tube with a camera on the end is passed down your throat and into your stomach and abdomen  How is pancreatitis treated? Treatment depends on the cause of your pancreatitis  You may need to stay in the hospital for treatment and more tests  · Medicines:      ¨ Antibiotics  treat a bacterial infection      ¨ Prescription pain medicine  may be given  Ask your healthcare provider how to take this medicine safely  Some prescription pain medicines contain acetaminophen  Do not take other medicines that contain acetaminophen without talking to your healthcare provider  Too much acetaminophen may cause liver damage  Prescription pain medicine may cause constipation  Ask your healthcare provider how to prevent or treat constipation  · Surgery  may be needed to open or widen blocked bile ducts or drain fluid from your pancreas  Your gallbladder may need to be removed if gallstones are causing your pancreatitis  How can I manage my symptoms? · Rest  when you feel it is needed  Slowly start to do more each day  Return to your usual activities as directed  · Do not drink any alcohol  If you need help to stop drinking, contact the following organization:   [employer]  Web Address: http://[employer]/      · Ask your healthcare provider or dietitian about the best foods to eat  You may need to eat foods that are low in fat if you have chronic pancreatitis  When should I seek immediate care? · You have severe pain in your abdomen and you are vomiting  When should I contact my healthcare provider? · You have a fever  · You continue to lose weight without trying  · Your skin or the whites of your eyes turn yellow  · You have questions or concerns about your condition or care  CARE AGREEMENT:   You have the right to help plan your care  Learn about your health condition and how it may be treated  Discuss treatment options with your caregivers to decide what care you want to receive  You always have the right to refuse treatment  The above information is an  only  It is not intended as medical advice for individual conditions or treatments  Talk to your doctor, nurse or pharmacist before following any medical regimen to see if it is safe and effective for you    © 2017 [address] [employer] Information is for End User's use only and may not be sold, redistributed or otherwise used for commercial purposes  All illustrations and images included in CareNotes® are the copyrighted property of A D A M , Inc  or Honorio Stafford  Pancreatitis   WHAT YOU NEED TO KNOW:   Pancreatitis is inflammation of your pancreas  The pancreas is an organ that makes insulin  It also makes enzymes (digestive juices) that help your body digest food  Pancreatitis may be an acute (short-term) problem that happens only once  It may become a chronic (long-term) problem that comes and goes over time  DISCHARGE INSTRUCTIONS:   Seek care immediately if:   · You have severe pain in your abdomen and you are vomiting  Contact your healthcare provider if:   · You have a fever  · You continue to lose weight without trying  · Your skin or the whites of your eyes turn yellow  · You have questions or concerns about your condition or care  Medicines: You may need any of the following:  · Antibiotics  treat a bacterial infection  · Prescription pain medicine  may be given  Ask your healthcare provider how to take this medicine safely  Some prescription pain medicines contain acetaminophen  Do not take other medicines that contain acetaminophen without talking to your healthcare provider  Too much acetaminophen may cause liver damage  Prescription pain medicine may cause constipation  Ask your healthcare provider how to prevent or treat constipation  · Take your medicine as directed  Contact your healthcare provider if you think your medicine is not helping or if you have side effects  Tell him or her if you are allergic to any medicine  Keep a list of the medicines, vitamins, and herbs you take  Include the amounts, and when and why you take them  Bring the list or the pill bottles to follow-up visits  Carry your medicine list with you in case of an emergency    Self-care:   · Rest  when you feel it is needed  Slowly start to do more each day  Return to your usual activities as directed  · Do not drink any alcohol  If you need help to stop drinking, contact the following organization:   [employer]  Web Address: http://www 5by/      · Ask your healthcare provider or dietitian about the best foods to eat  You may need to eat foods that are low in fat if you have chronic pancreatitis  Follow up with your healthcare provider as directed:  Write down your questions so you remember to ask them during your visits  © 2017 [address] [employer] Information is for End User's use only and may not be sold, redistributed or otherwise used for commercial purposes  All illustrations and images included in CareNotes® are the copyrighted property of A D A M , Inc  or Honorio Stafford  The above information is an  only  It is not intended as medical advice for individual conditions or treatments  Talk to your doctor, nurse or pharmacist before following any medical regimen to see if it is safe and effective for you  Cigarette Smoking and Your Health   WHAT YOU NEED TO KNOW:   What are the risks to my health if I smoke tobacco?  Nicotine and other chemicals found in tobacco damage every cell in your body  Even if you are a light smoker, you have an increased risk for cancer, heart disease, and lung disease  If you are pregnant or have diabetes, smoking increases your risk for complications  What are the benefits to my health if I stop smoking? · You decrease respiratory symptoms such as coughing, wheezing, and shortness of breath  · You reduce your risk for cancers of the lung, mouth, throat, kidney, bladder, pancreas, stomach, and cervix  If you already have cancer, you increase the benefits of chemotherapy  You also reduce your risk for cancer returning or a second cancer from developing       · You reduce your risk for heart disease, blood clots, heart attack, and stroke  · You reduce your risk for lung infections, and diseases such as pneumonia, asthma, chronic bronchitis, and emphysema  · Your circulation improves  More oxygen can be delivered to your body  If you have diabetes, you lower your risk for complications, such as kidney, artery, and eye diseases  You also lower your risk for nerve damage  Nerve damage can lead to amputations, poor vision, and blindness  · You improve your body's ability to heal and to fight infections  What are the health benefits to others if I stop smoking? Tobacco is harmful to nonsmokers who breathe in your secondhand smoke  The following are ways the health of others around you may improve when you stop smoking:  · You lower the risks for lung cancer and heart disease in nonsmoking adults  · If you are pregnant, you lower the risk for miscarriage, early delivery, low birth weight, and stillbirth  You also lower your baby's risk for SIDS, obesity, developmental delay, and neurobehavioral problems, such as ADHD  · If you have children, you lower their risk for ear infections, colds, pneumonia, bronchitis, and asthma  Where can I find more information and support to stop smoking? · [employer]  Phone: 8- [phone] - [phone]  Web Address: www [employer]  CARE AGREEMENT:   You have the right to help plan your care  Learn about your health condition and how it may be treated  Discuss treatment options with your caregivers to decide what care you want to receive  You always have the right to refuse treatment  The above information is an  only  It is not intended as medical advice for individual conditions or treatments  Talk to your doctor, nurse or pharmacist before following any medical regimen to see if it is safe and effective for you  © 2017 [employer] Information is for End User's use only and may not be sold, redistributed or otherwise used for commercial purposes   All illustrations and images included in CareNotes® are the copyrighted property of A D A M , Inc  or Honorio Stafford  How to Stop Smoking   WHAT YOU NEED TO KNOW:   Why should I stop smoking? You will improve your health and the health of others around you if you stop smoking  Your risk for heart and lung disease, cancer, stroke, heart attack, and vision problems will also decrease  You can benefit from quitting no matter how long you have smoked  How can I prepare to stop smoking? Nicotine is a highly addictive drug found in cigarettes  Withdrawal symptoms can happen when you stop smoking and make it hard to quit  These include anxiety, depression, irritability, trouble sleeping, and increased appetite  You increase your chances of success if you prepare to quit  · Set a quit date  [place] a date that is within the next 2 weeks  Do not pick a day that you think may be stressful or busy  Write down the day or [place] it on your calender  · Tell friends and family that you plan to quit  Explain that you may have withdrawal symptoms when you try to quit  Ask them to support you  They may be able to encourage you and help reduce your stress to make it easier for you to quit  · Make a list of your reasons for quitting  Put the list somewhere you will see it every day, such as your refrigerator  You can look at the list when you have a craving  · Remove all tobacco and nicotine products from your home, car, and workplace  Also, remove anything else that will tempt you to smoke, such as lighters, matches, or ashtrays  Clean your car, home, and places at work that smell like smoke  The smell of smoke can trigger a craving  · Identify triggers that make you want to smoke  This may include activities, feelings, or people  Also write down 1 way you can deal with each of your triggers   For example, if you want to smoke as soon as you wake up, plan another activity during this time, such as exercise  · Make a plan for how you will quit  Learn about the tools that can help you quit, such as medicine, counseling, or nicotine replacement therapy  Choose at least 2 options to help you quit  What are some tools to help me stop smoking? · Counseling  from a trained healthcare provider can provide you with support and skills to quit smoking  The provider will also teach you to manage your withdrawal symptoms and cravings  You may receive counseling from one counselor, in group therapy, or through phone therapy called a quit line  · Nicotine replacement therapy (NRT)  such as nicotine patches, gum, or lozenges may help reduce your nicotine cravings  You may get these without a doctor's order  Do not use e-cigarettes or smokeless tobacco in place of cigarettes or to help you quit  They still contain nicotine  · Prescription medicines  such as nasal sprays or nicotine inhalers may help reduce your withdrawal symptoms  Other medicines may also be used to reduce your urge to smoke  Ask your healthcare provider about these medicines  You may need to start certain medicines 2 weeks before your quit date for them to work well  · Hypnosis  is a practice that helps guide you through thoughts and feelings  Hypnosis may help decrease your cravings and make you more willing to quit  · Acupuncture therapy  uses very thin needles to balance energy channels in the body  This is thought to help decrease cravings and symptoms of nicotine withdrawal      · Support groups  let you talk to others who are trying to quit or have already quit  It may be helpful to speak with others about how they quit  How can I manage my cravings? · Avoid situations, people, and places that tempt you to smoke  Go to nonsmoking places, such as libraries or restaurants  Understand what tempts you and try to avoid these things  · Keep your hands busy  Hold things such as a stress ball or pen       · Put candy or toothpicks in your mouth  Keep lollipops, sugarless gum, or toothpicks with you at all times  · Do not have alcohol or caffeine  These drinks may tempt you to smoke  Drink healthy liquids such as water or juice instead  · Reward yourself when you resist your cravings  Rewards will motivate you and help you stay positive  · Do an activity that distracts you from your craving  Examples include going for a walk, exercising, or cleaning  What should I know about weight gain after I quit? You may gain a few pounds after you quit smoking  It is healthier for you to gain a few pounds than to continue to smoke  The following can help you prevent weight gain:  · Eat healthy foods  These include fruits, vegetables, whole-grain breads, low-fat dairy products, beans, lean meats, and fish  Eat healthy snacks, such as low-fat yogurt, if you get hungry between meals  · Drink water before, during, and between meals  This will make your stomach feel full and help prevent you from overeating  Ask your healthcare provider how much liquid to drink each day and which liquids are best for you  · Exercise  Take a walk or do some kind of exercise every day  Ask your healthcare provider what exercise is right for you  This may help reduce your cravings and reduce stress  Where can I find support and more information? · [employer]  Phone: 1- 672 - [phone]  Web Address: www [employer]  CARE AGREEMENT:   You have the right to help plan your care  Learn about your health condition and how it may be treated  Discuss treatment options with your caregivers to decide what care you want to receive  You always have the right to refuse treatment  The above information is an  only  It is not intended as medical advice for individual conditions or treatments  Talk to your doctor, nurse or pharmacist before following any medical regimen to see if it is safe and effective for you    © 2017 [employer] [facility] is for End User's use only and may not be sold, redistributed or otherwise used for commercial purposes  All illustrations and images included in CareNotes® are the copyrighted property of A D A M , Inc  or Honorio Stafford  Abuse of Alcohol   WHAT YOU NEED TO KNOW:   What is alcohol abuse? · Alcohol abuse is unhealthy drinking behavior  You may drink too much once a week, or continue to drink too much daily  You continue to drink even though it causes problems  The problems may include legal problems, problems at work, or problems with relationships  · If you drink too much at one time, you are binge drinking  Binge drinking is when you have a large amount of alcohol in a short time  Your blood alcohol concentrations (URSULA) goes above 0 08 g/dLlevel during binge drinking  For men, this usually happens with more than 4 drinks in 2 hours  For women, it is more than 3 drinks in 2 hours  A drink is 12 ounces of beer, 4 ounces of wine, or 1½ ounces of liquor  What are the signs and symptoms of alcohol abuse? Each person that abuses alcohol may have different symptoms  The following are common signs and symptoms of alcohol abuse:  · Loss of interest in activities, work, and school    · Decreased interest in family and friends    · Depression    · Constant thoughts about drinking    · Not able to control the amount you drink    · Restlessness, or erratic and violent behavior  What are the long-term effects of alcohol abuse? · Blackouts    · Memory loss    · Dementia    · Liver disease    · Thiamine (vitamin B1) deficiency  What treatments or therapies are used to treat alcohol abuse? · Detoxification (detox) and withdrawal  is a program that helps you to safely get alcohol out of your body  Detox can also help get rid of the physical need to drink   Healthcare providers monitor the physical symptoms of withdrawal  They may give you medicines to help decrease nausea, dehydration, and seizures  Healthcare providers will also monitor your blood pressure, heart and breathing rates, and your temperature  Symptoms of anxiety, depression, and suicidal thoughts are also monitored and managed during detox  Healthcare providers may give you medicines for these symptoms and therapy sessions will be available to you  Detox is usually done at a detox center or in a hospital  Healthcare providers do not recommend that you try to detox at home or by yourself  Withdrawal symptoms may become life-threatening  The center can help you find 12 step programs or an individual therapist to help with emotional support after detox  · Inpatient and outpatient treatment  focus on your personal needs to help you stop drinking  Treatment helps you understand the reasons you abuse alcohol  Counselors and therapists provide you with support and help you find ways to cope instead of drinking  You may need inpatient treatment to provide a controlled environment  You may need outpatient treatment after your inpatient treatment is complete  · Alcohol aversion therapy  takes away the desire to drink by causing a negative reaction when you drink  Healthcare providers may give you medicines that cause nausea and vomiting when you drink alcohol  They may instead give you a medicine that decreases your urge to drink alcohol  These medicines are used to help you stop drinking or reduce the amount you drink  They can also help you avoid relapse  What are the risks of alcohol abuse? Alcohol abuse increases your risk for gastrointestinal cancers, brain damage and problems with your immune system  It also increases your risk for heart, kidney, and lung damage  The risk of stroke increases with alcohol abuse  If you are pregnant and drink alcohol, you and your baby are at risk for serious health problems  Where can I find support and more information?    · Alcoholics Anonymous  Web Address: http://www nelson info/  · Substance Abuse and [place] [address] , [address] [place]  Web Address: https://[employer]/  Call 735 for the following:   · You have sudden chest pain or trouble breathing  · You want to harm yourself or others  · You have a seizure or have shaking or trembling  When should I seek immediate care? · You have hallucinations (you see or hear things that are not real)  · You cannot stop vomiting or you vomit blood  When should I contact my healthcare provider? · You need help to stop drinking alcohol  · You have questions or concerns about your condition or care  CARE AGREEMENT:   You have the right to help plan your care  Learn about your health condition and how it may be treated  Discuss treatment options with your caregivers to decide what care you want to receive  You always have the right to refuse treatment  The above information is an  only  It is not intended as medical advice for individual conditions or treatments  Talk to your doctor, nurse or pharmacist before following any medical regimen to see if it is safe and effective for you  © 2017 [address] Information is for End User's use only and may not be sold, redistributed or otherwise used for commercial purposes  All illustrations and images included in CareNotes® are the copyrighted property of [employer]  or Honorio Stafford

## 2020-08-29 NOTE — ASSESSMENT & PLAN NOTE
· A nicotine patch was utilized during the hospitalization  · Smoking cessation counseling was given to the patient during the hospitalization

## 2020-08-29 NOTE — ASSESSMENT & PLAN NOTE
· The patient was seen in consultation by Gastroenterology  · Needs an endoscopic ultrasound completed by Gastroenterology as soon as possible as an outpatient to rule-out malignancy    Results for Anuj Rider (MRN [id number]) as of 8/26/2020 13:13   Ref  Range 8/23/2020 06:08   CA 19-9 Latest Ref Range: 0 - 35 U/mL 1     MRI of the abdomen (08/05/2020): IMPRESSION:     20 mm cystic lesion in the uncinate process of the pancreas shows a short interval increase in size and complexity when compared to July  Relatively rapid evolution probably favors postinflammatory pseudocyst   However, apparent nonenhancing mural nodularity is a worrisome feature warranting gastroenterology referral for endoscopic ultrasound  No high risk features

## 2020-08-29 NOTE — ASSESSMENT & PLAN NOTE
· Likely secondary to opiate treatment during the hospitalization  · Continue colace 100 mg PO BID  · Utilize miralax 17 grams PO Qdaily PRN constipation  Outpatient follow-up with PCP in regards to this matter

## 2020-08-29 NOTE — ASSESSMENT & PLAN NOTE
· Likely secondary to alcohol abuse  · Avoid all hepatotoxic agents  · Follow the LFT's (liver function tests) after discharge  · Outpatient follow-up with Gastroenterology    Results for Janneth Mayo (MRN [id number]) as of 8/25/2020 13:13   Ref   Range 8/24/2020 05:10   HEPATITIS A IGM ANTIBODY Latest Ref Range: Non-reactive, Equivocal-Suggest Recollect  Non-reactive   HEPATITIS B SURFACE ANTIGEN Latest Ref Range: Non-reactive, NonReactive - Confirmed  Non-reactive   HEPATITIS B CORE IGM ANTIBODY Latest Ref Range: Non-reactive  Non-reactive   HEPATITIS C ANTIBODY Latest Ref Range: Non-reactive  Non-reactive

## 2020-08-29 NOTE — PLAN OF CARE
Problem: PAIN - ADULT  Goal: Verbalizes/displays adequate comfort level or baseline comfort level  Description: Interventions:  - Encourage patient to monitor pain and request assistance  - Assess pain using appropriate pain scale  - Administer analgesics based on type and severity of pain and evaluate response  - Implement non-pharmacological measures as appropriate and evaluate response  - Consider cultural and social influences on pain and pain management  - Notify physician/advanced practitioner if interventions unsuccessful or patient reports new pain  Outcome: Progressing     Problem: INFECTION - ADULT  Goal: Absence or prevention of progression during hospitalization  Description: INTERVENTIONS:  - Assess and monitor for signs and symptoms of infection  - Monitor lab/diagnostic results  - Monitor all insertion sites, i e  indwelling lines, tubes, and drains  - Monitor endotracheal if appropriate and nasal secretions for changes in amount and color  - [place] appropriate cooling/warming therapies per order  - Administer medications as ordered  - Instruct and encourage patient and family to use good hand hygiene technique  - Identify and instruct in appropriate isolation precautions for identified infection/condition  Outcome: Progressing     Problem: SAFETY ADULT  Goal: Patient will remain free of falls  Description: INTERVENTIONS:  - Assess patient frequently for physical needs  -  Identify cognitive and physical deficits and behaviors that affect risk of falls    -  [place] fall precautions as indicated by assessment   - Educate patient/family on patient safety including physical limitations  - Instruct patient to call for assistance with activity based on assessment  - Modify environment to reduce risk of injury  - Consider OT/PT consult to assist with strengthening/mobility  Outcome: Progressing  Goal: Maintain or return to baseline ADL function  Description: INTERVENTIONS:  -  Assess patient's ability to carry out ADLs; assess patient's baseline for ADL function and identify physical deficits which impact ability to perform ADLs (bathing, care of mouth/teeth, toileting, grooming, dressing, etc )  - Assess/evaluate cause of self-care deficits   - Assess range of motion  - Assess patient's mobility; develop plan if impaired  - Assess patient's need for assistive devices and provide as appropriate  - Encourage maximum independence but intervene and supervise when necessary  - Involve family in performance of ADLs  - Assess for home care needs following discharge   - Consider OT consult to assist with ADL evaluation and planning for discharge  - Provide patient education as appropriate  Outcome: Progressing  Goal: Maintain or return mobility status to optimal level  Description: INTERVENTIONS:  - Assess patient's baseline mobility status (ambulation, transfers, stairs, etc )    - Identify cognitive and physical deficits and behaviors that affect mobility  - Identify mobility aids required to assist with transfers and/or ambulation (gait belt, sit-to-stand, lift, walker, cane, etc )  - [place] fall precautions as indicated by assessment  - Record patient progress and toleration of activity level on Mobility SBAR; progress patient to next Phase/Stage  - Instruct patient to call for assistance with activity based on assessment  - Consider rehabilitation consult to assist with strengthening/weightbearing, etc   Outcome: Progressing     Problem: DISCHARGE PLANNING  Goal: Discharge to home or other facility with appropriate resources  Description: INTERVENTIONS:  - Identify barriers to discharge w/patient and caregiver  - Arrange for needed discharge resources and transportation as appropriate  - Identify discharge learning needs (meds, wound care, etc )  - Refer to Case Management Department for coordinating discharge planning if the patient needs post-hospital services based on physician/advanced practitioner order or complex needs related to functional status, cognitive ability, or social support system  Outcome: Progressing     Problem: Knowledge Deficit  Goal: Patient/family/caregiver demonstrates understanding of disease process, treatment plan, medications, and discharge instructions  Description: Complete learning assessment and assess knowledge base  Interventions:  - Provide teaching at level of understanding  - Provide teaching via preferred learning methods  Outcome: Progressing     Problem: GASTROINTESTINAL - ADULT  Goal: Minimal or absence of nausea and/or vomiting  Description: INTERVENTIONS:  - Administer IV fluids if ordered to ensure adequate hydration  - Administer ordered antiemetic medications as needed  - Provide nonpharmacologic comfort measures as appropriate  - Advance diet as tolerated, if ordered  - Consider nutrition services referral to assist patient with adequate nutrition and appropriate food choices  Outcome: Progressing     Problem: METABOLIC, FLUID AND ELECTROLYTES - ADULT  Goal: Electrolytes maintained within normal limits  Description: INTERVENTIONS:  - Monitor labs and assess patient for signs and symptoms of electrolyte imbalances  - Administer electrolyte replacement as ordered  - Monitor response to electrolyte replacements, including repeat lab results as appropriate  - Instruct patient on fluid and nutrition as appropriate  Outcome: Progressing     Problem: Potential for Falls  Goal: Patient will remain free of falls  Description: INTERVENTIONS:  - Assess patient frequently for physical needs  -  Identify cognitive and physical deficits and behaviors that affect risk of falls    -  [place] fall precautions as indicated by assessment   - Educate patient/family on patient safety including physical limitations  - Instruct patient to call for assistance with activity based on assessment  - Modify environment to reduce risk of injury  - Consider OT/PT consult to assist with strengthening/mobility  Outcome: Progressing

## 2020-08-29 NOTE — ASSESSMENT & PLAN NOTE
· The CIWA protocol was initially utilized during the hospitalization    · Continue thiamine 100 mg PO Qdaily, folic acid 1 mg PO Qdaily, and a daily PO multivitamin  · Pt refused alcohol rehab

## 2020-08-29 NOTE — SOCIAL WORK
Pt is being discharged today  Pt's sister will give the patient a ride home  I in basket messaged pt's PCP to call him at home and schedule a follow up appointment  Follow up appointments and AVS reviewed with patient with good understanding  Case Management reviewed discharge planning process including the following: identifying help that is needed at home, pt's preference for discharge needs and Meds at [facility]  Reviewed with Pt that any member of the healthcare team can answer questions regarding : medications, jmportance of recognizing  Signs and symptoms of any  medical problems  Case Management also encouraged pt to follow up with all recommended appointments after discharge

## 2020-08-31 ENCOUNTER — TELEPHONE (OUTPATIENT)
Dept: FAMILY MEDICINE CLINIC | Facility: CLINIC | Age: 55
End: 2020-08-31

## 2020-08-31 ENCOUNTER — APPOINTMENT (OUTPATIENT)
Dept: LAB | Facility: MEDICAL CENTER | Age: 55
End: 2020-08-31
Payer: COMMERCIAL

## 2020-08-31 ENCOUNTER — TELEPHONE (OUTPATIENT)
Dept: HEMATOLOGY ONCOLOGY | Facility: CLINIC | Age: 55
End: 2020-08-31

## 2020-08-31 DIAGNOSIS — E87.6 HYPOKALEMIA: ICD-10-CM

## 2020-08-31 DIAGNOSIS — D61.818 PANCYTOPENIA (HCC): ICD-10-CM

## 2020-08-31 DIAGNOSIS — E83.42 HYPOMAGNESEMIA: ICD-10-CM

## 2020-08-31 DIAGNOSIS — E83.39 HYPOPHOSPHATEMIA: ICD-10-CM

## 2020-08-31 DIAGNOSIS — R74.01 TRANSAMINITIS: ICD-10-CM

## 2020-08-31 DIAGNOSIS — D69.6 THROMBOCYTOPENIA (HCC): ICD-10-CM

## 2020-08-31 DIAGNOSIS — E87.1 HYPONATREMIA: ICD-10-CM

## 2020-08-31 LAB
ALBUMIN SERPL BCP-MCNC: 4.2 G/DL (ref 3.5–5)
ALP SERPL-CCNC: 136 U/L (ref 46–116)
ALT SERPL W P-5'-P-CCNC: 61 U/L (ref 12–78)
ANION GAP SERPL CALCULATED.3IONS-SCNC: 5 MMOL/L (ref 4–13)
AST SERPL W P-5'-P-CCNC: 126 U/L (ref 5–45)
BASOPHILS # BLD AUTO: 0.15 THOUSANDS/ΜL (ref 0–0.1)
BASOPHILS NFR BLD AUTO: 2 % (ref 0–1)
BILIRUB SERPL-MCNC: 0.54 MG/DL (ref 0.2–1)
BUN SERPL-MCNC: 6 MG/DL (ref 5–25)
CALCIUM SERPL-MCNC: 9.8 MG/DL (ref 8.3–10.1)
CHLORIDE SERPL-SCNC: 97 MMOL/L (ref 100–108)
CO2 SERPL-SCNC: 28 MMOL/L (ref 21–32)
CREAT SERPL-MCNC: 0.78 MG/DL (ref 0.6–1.3)
EOSINOPHIL # BLD AUTO: 0.22 THOUSAND/ΜL (ref 0–0.61)
EOSINOPHIL NFR BLD AUTO: 3 % (ref 0–6)
ERYTHROCYTE [DISTWIDTH] IN BLOOD BY AUTOMATED COUNT: 18.5 % (ref 11.6–15.1)
GFR SERPL CREATININE-BSD FRML MDRD: 102 ML/MIN/1.73SQ M
GLUCOSE P FAST SERPL-MCNC: 136 MG/DL (ref 65–99)
HCT VFR BLD AUTO: 35.4 % (ref 36.5–49.3)
HGB BLD-MCNC: 10.3 G/DL (ref 12–17)
IMM GRANULOCYTES # BLD AUTO: 0.02 THOUSAND/UL (ref 0–0.2)
IMM GRANULOCYTES NFR BLD AUTO: 0 % (ref 0–2)
LYMPHOCYTES # BLD AUTO: 2.09 THOUSANDS/ΜL (ref 0.6–4.47)
LYMPHOCYTES NFR BLD AUTO: 31 % (ref 14–44)
MAGNESIUM SERPL-MCNC: 1.7 MG/DL (ref 1.6–2.6)
MCH RBC QN AUTO: 23.8 PG (ref 26.8–34.3)
MCHC RBC AUTO-ENTMCNC: 29.1 G/DL (ref 31.4–37.4)
MCV RBC AUTO: 82 FL (ref 82–98)
MONOCYTES # BLD AUTO: 1.19 THOUSAND/ΜL (ref 0.17–1.22)
MONOCYTES NFR BLD AUTO: 18 % (ref 4–12)
NEUTROPHILS # BLD AUTO: 3.03 THOUSANDS/ΜL (ref 1.85–7.62)
NEUTS SEG NFR BLD AUTO: 46 % (ref 43–75)
NRBC BLD AUTO-RTO: 0 /100 WBCS
PHOSPHATE SERPL-MCNC: 4 MG/DL (ref 2.7–4.5)
PLATELET # BLD AUTO: 441 THOUSANDS/UL (ref 149–390)
PMV BLD AUTO: 10.7 FL (ref 8.9–12.7)
POTASSIUM SERPL-SCNC: 4.7 MMOL/L (ref 3.5–5.3)
PROT SERPL-MCNC: 8.9 G/DL (ref 6.4–8.2)
RBC # BLD AUTO: 4.32 MILLION/UL (ref 3.88–5.62)
SODIUM SERPL-SCNC: 130 MMOL/L (ref 136–145)
WBC # BLD AUTO: 6.7 THOUSAND/UL (ref 4.31–10.16)

## 2020-08-31 PROCEDURE — 85025 COMPLETE CBC W/AUTO DIFF WBC: CPT

## 2020-08-31 PROCEDURE — 83735 ASSAY OF MAGNESIUM: CPT

## 2020-08-31 PROCEDURE — 80053 COMPREHEN METABOLIC PANEL: CPT

## 2020-08-31 PROCEDURE — 84100 ASSAY OF PHOSPHORUS: CPT

## 2020-08-31 PROCEDURE — 36415 COLL VENOUS BLD VENIPUNCTURE: CPT

## 2020-08-31 NOTE — TELEPHONE ENCOUNTER
Called patient left message to schedule appointment         ----- Message from Ramiro Moody RN sent at 8/29/2020  9:19 AM EDT -----  Regarding: follow up appointment  Pt is being discharged from hospital today  Please call the patient at home to schedule a follow up appointment                                 Thanks                            Meme Jackson

## 2020-08-31 NOTE — TELEPHONE ENCOUNTER
Patient did COVID-19 pre screening  Patient was tested and it was negative   Patient answered no to all other pre screening questions

## 2020-08-31 NOTE — UTILIZATION REVIEW
Notification of Discharge  This is a Notification of Discharge from our facility [address]  Please be advised that this patient has been discharge from our facility  Below you will find the admission and discharge date and time including the patients disposition  PRESENTATION DATE: 8/20/2020  5:22 PM  OBS ADMISSION DATE: 08/20/2020  IP ADMISSION DATE: 8/21/20 0902   DISCHARGE DATE: 8/29/2020 12:30 PM  DISPOSITION: Home/Self Care Home/Self Care   Admission Orders listed below:  Admission Orders (From admission, onward)     Ordered        08/21/20 0902  Inpatient Admission  Once         08/20/20 2127  Place in Observation  Once                   Please contact the UR Department if additional information is required to close this patient's authorization/case  [address] [facility] Utilization Review Department  Main: [phone] x carefully listen to the prompts  All voicemails are confidential   [email or web address]  Send all requests for admission clinical reviews, approved or denied determinations and any other requests to dedicated fax number below belonging to the campus where the patient is receiving treatment   List of dedicated fax numbers:  [address] DENIALS (Administrative/Medical Necessity) [phone]   [address]  (Maternity/NICU/Pediatrics) [phone]   Lorilee Baptise [phone]   Areta Sell [phone]   Zhane Pitch [phone]   [facility] [address] [place] [address] [phone]   [facility]  [phone]   [address] [facility] [address] [facility]  [address] [facility] [address] [facility] [phone]

## 2020-08-31 NOTE — ASSESSMENT & PLAN NOTE
Stop prilosec start on protonix 40 mg daily. Let me know if insurance will not coer       Recombinant Zoster (Shingles) Vaccine: What You Need to Know  1. Why get vaccinated?  Recombinant zoster (shingles) vaccine can prevent shingles.  Shingles (also called herpes zoster, or just zoster) is a painful skin rash, usually with blisters. In addition to the rash, shingles can cause fever, headache, chills, or upset stomach. More rarely, shingles can lead to pneumonia, hearing problems, blindness, brain inflammation (encephalitis), or death.  The most common complication of shingles is long-term nerve pain called postherpetic neuralgia (PHN). PHN occurs in the areas where the shingles rash was, even after the rash clears up. It can last for months or years after the rash goes away. The pain from PHN can be severe and debilitating.  About 10 to 18% of people who get shingles will experience PHN. The risk of PHN increases with age. An older adult with shingles is more likely to develop PHN and have longer lasting and more severe pain than a younger person with shingles.  Shingles is caused by the varicella zoster virus, the same virus that causes chickenpox. After you have chickenpox, the virus stays in your body and can cause shingles later in life. Shingles cannot be passed from one person to another, but the virus that causes shingles can spread and cause chickenpox in someone who had never had chickenpox or received chickenpox vaccine.  2. Recombinant shingles vaccine  Recombinant shingles vaccine provides strong protection against shingles. By preventing shingles, recombinant shingles vaccine also protects against PHN.  Recombinant shingles vaccine is the preferred vaccine for the prevention of shingles. However, a different vaccine, live shingles vaccine, may be used in some circumstances.  The recombinant shingles vaccine is recommended for adults 50 years and older without serious immune problems. It is given  Alcohol use six cans of beers a day  Continue thiamine folic acid    At chlordiazepoxide to prevent withdrawal symptoms as a two-dose series.  This vaccine is also recommended for people who have already gotten another type of shingles vaccine, the live shingles vaccine. There is no live virus in this vaccine.  Shingles vaccine may be given at the same time as other vaccines.  3. Talk with your health care provider  Tell your vaccine provider if the person getting the vaccine:  · Has had an allergic reaction after a previous dose of recombinant shingles vaccine, or has any severe, life-threatening allergies.  · Is pregnant or breastfeeding.  · Is currently experiencing an episode of shingles.  In some cases, your health care provider may decide to postpone shingles vaccination to a future visit.  People with minor illnesses, such as a cold, may be vaccinated. People who are moderately or severely ill should usually wait until they recover before getting recombinant shingles vaccine.  Your health care provider can give you more information.  4. Risks of a vaccine reaction  · A sore arm with mild or moderate pain is very common after recombinant shingles vaccine, affecting about 80% of vaccinated people. Redness and swelling can also happen at the site of the injection.  · Tiredness, muscle pain, headache, shivering, fever, stomach pain, and nausea happen after vaccination in more than half of people who receive recombinant shingles vaccine.  In clinical trials, about 1 out of 6 people who got recombinant zoster vaccine experienced side effects that prevented them from doing regular activities. Symptoms usually went away on their own in 2 to 3 days.  You should still get the second dose of recombinant zoster vaccine even if you had one of these reactions after the first dose.  People sometimes faint after medical procedures, including vaccination. Tell your provider if you feel dizzy or have vision changes or ringing in the ears.  As with any medicine, there is a very remote chance of a vaccine causing a severe allergic reaction, other  serious injury, or death.  5. What if there is a serious problem?  An allergic reaction could occur after the vaccinated person leaves the clinic. If you see signs of a severe allergic reaction (hives, swelling of the face and throat, difficulty breathing, a fast heartbeat, dizziness, or weakness), call 9-1-1 and get the person to the nearest hospital.  For other signs that concern you, call your health care provider.  Adverse reactions should be reported to the Vaccine Adverse Event Reporting System (VAERS). Your health care provider will usually file this report, or you can do it yourself. Visit the VAERS website at www.vaers.[facility].gov or call 1-917.614.9057. VAERS is only for reporting reactions, and VAERS staff do not give medical advice.  6. How can I learn more?  · Ask your health care provider.  · Call your local or state health department.  · Contact the Centers for Disease Control and Prevention (CDC):  ? Call 1-441.872.8704 (9-334-UDH-INFO) or  ? Visit CDC's website at www.cdc.gov/vaccines  Vaccine Information Statement Recombinant Zoster Vaccine (10/30/2019)  This information is not intended to replace advice given to you by your health care provider. Make sure you discuss any questions you have with your health care provider.  Document Released: 02/27/2018 Document Revised: 04/07/2020 Document Reviewed: 07/24/2019  Elsevier Patient Education © 2020 [employer]    Gastroesophageal Reflux Disease, Adult  Gastroesophageal reflux (BARRERA) happens when acid from the stomach flows up into the tube that connects the mouth and the stomach (esophagus). Normally, food travels down the esophagus and stays in the stomach to be digested. However, when a person has BARRERA, food and stomach acid sometimes move back up into the esophagus. If this becomes a more serious problem, the person may be diagnosed with a disease called gastroesophageal reflux disease (GERD). GERD occurs when the reflux:  · Happens often.  · Causes  frequent or severe symptoms.  · Causes problems such as damage to the esophagus.  When stomach acid comes in contact with the esophagus, the acid may cause soreness (inflammation) in the esophagus. Over time, GERD may create small holes (ulcers) in the lining of the esophagus.  What are the causes?  This condition is caused by a problem with the muscle between the esophagus and the stomach (lower esophageal sphincter, or LES). Normally, the LES muscle closes after food passes through the esophagus to the stomach. When the LES is weakened or abnormal, it does not close properly, and that allows food and stomach acid to go back up into the esophagus.  The LES can be weakened by certain dietary substances, medicines, and medical conditions, including:  · Tobacco use.  · Pregnancy.  · Having a hiatal hernia.  · Alcohol use.  · Certain foods and beverages, such as coffee, chocolate, onions, and peppermint.  What increases the risk?  You are more likely to develop this condition if you:  · Have an increased body weight.  · Have a connective tissue disorder.  · Use NSAID medicines.  What are the signs or symptoms?  Symptoms of this condition include:  · Heartburn.  · Difficult or painful swallowing.  · The feeling of having a lump in the throat.  · A bitter taste in the mouth.  · Bad breath.  · Having a large amount of saliva.  · Having an upset or bloated stomach.  · Belching.  · Chest pain. Different conditions can cause chest pain. Make sure you see your health care provider if you experience chest pain.  · Shortness of breath or wheezing.  · Ongoing (chronic) cough or a night-time cough.  · Wearing away of tooth enamel.  · Weight loss.  How is this diagnosed?  Your health care provider will take a medical history and perform a physical exam. To determine if you have mild or severe GERD, your health care provider may also monitor how you respond to treatment. You may also have tests, including:  · A test to examine your  stomach and esophagus with a small camera (endoscopy).  · A test that measures the acidity level in your esophagus.  · A test that measures how much pressure is on your esophagus.  · A barium swallow or modified barium swallow test to show the shape, size, and functioning of your esophagus.  How is this treated?  The goal of treatment is to help relieve your symptoms and to prevent complications. Treatment for this condition may vary depending on how severe your symptoms are. Your health care provider may recommend:  · Changes to your diet.  · Medicine.  · Surgery.  Follow these instructions at home:  Eating and drinking    · Follow a diet as recommended by your health care provider. This may involve avoiding foods and drinks such as:  ? Coffee and tea (with or without caffeine).  ? Drinks that contain alcohol.  ? Energy drinks and sports drinks.  ? Carbonated drinks or sodas.  ? Chocolate and cocoa.  ? Peppermint and mint flavorings.  ? Garlic and onions.  ? Horseradish.  ? Spicy and acidic foods, including peppers, chili powder, jackson powder, vinegar, hot sauces, and barbecue sauce.  ? Citrus fruit juices and citrus fruits, such as oranges, jimi, and limes.  ? Tomato-based foods, such as red sauce, chili, salsa, and pizza with red sauce.  ? Fried and fatty foods, such as donuts, french fries, potato chips, and high-fat dressings.  ? High-fat meats, such as hot dogs and fatty cuts of red and white meats, such as rib eye steak, sausage, ham, and [place].  ? High-fat dairy items, such as whole milk, butter, and cream cheese.  · Eat small, frequent meals instead of large meals.  · Avoid drinking large amounts of liquid with your meals.  · Avoid eating meals during the 2-3 hours before bedtime.  · Avoid lying down right after you eat.  · Do not exercise right after you eat.  Lifestyle    · Do not use any products that contain nicotine or tobacco, such as cigarettes, e-cigarettes, and chewing tobacco. If you need help  quitting, ask your health care provider.  · Try to reduce your stress by using methods such as yoga or meditation. If you need help reducing stress, ask your health care provider.  · If you are overweight, reduce your weight to an amount that is healthy for you. Ask your health care provider for guidance about a safe weight loss goal.  General instructions  · Pay attention to any changes in your symptoms.  · Take over-the-counter and prescription medicines only as told by your health care provider. Do not take aspirin, ibuprofen, or other NSAIDs unless your health care provider told you to do so.  · Wear loose-fitting clothing. Do not wear anything tight around your waist that causes pressure on your abdomen.  · Raise (elevate) the head of your bed about 6 inches (15 cm).  · Avoid bending over if this makes your symptoms worse.  · Keep all follow-up visits as told by your health care provider. This is important.  Contact a health care provider if:  · You have:  ? New symptoms.  ? Unexplained weight loss.  ? Difficulty swallowing or it hurts to swallow.  ? Wheezing or a persistent cough.  ? A hoarse voice.  · Your symptoms do not improve with treatment.  Get help right away if you:  · Have pain in your arms, neck, jaw, teeth, or back.  · Feel sweaty, dizzy, or light-headed.  · Have chest pain or shortness of breath.  · Vomit and your vomit looks like blood or coffee grounds.  · Faint.  · Have stool that is bloody or black.  · Cannot swallow, drink, or eat.  Summary  · Gastroesophageal reflux happens when acid from the stomach flows up into the esophagus. GERD is a disease in which the reflux happens often, causes frequent or severe symptoms, or causes problems such as damage to the esophagus.  · Treatment for this condition may vary depending on how severe your symptoms are. Your health care provider may recommend diet and lifestyle changes, medicine, or surgery.  · Contact a health care provider if you have new or  worsening symptoms.  · Take over-the-counter and prescription medicines only as told by your health care provider. Do not take aspirin, ibuprofen, or other NSAIDs unless your health care provider told you to do so.  · Keep all follow-up visits as told by your health care provider. This is important.  This information is not intended to replace advice given to you by your health care provider. Make sure you discuss any questions you have with your health care provider.  Document Released: 09/27/2006 Document Revised: 06/26/2019 Document Reviewed: 06/26/2019  [employer] Patient Education © 2020 [employer]    Food Choices for Gastroesophageal Reflux Disease, Adult  When you have gastroesophageal reflux disease (GERD), the foods you eat and your eating habits are very important. Choosing the right foods can help ease your discomfort. Think about working with a nutrition specialist (dietitian) to help you make good choices.  What are tips for following this plan?    Meals  · Choose healthy foods that are low in fat, such as fruits, vegetables, whole grains, low-fat dairy products, and lean meat, fish, and poultry.  · Eat small meals often instead of 3 large meals a day. Eat your meals slowly, and in a place where you are relaxed. Avoid bending over or lying down until 2-3 hours after eating.  · Avoid eating meals 2-3 hours before bed.  · Avoid drinking a lot of liquid with meals.  · Cook foods using methods other than frying. Bake, grill, or broil food instead.  · Avoid or limit:  ? Chocolate.  ? Peppermint or spearmint.  ? Alcohol.  ? Pepper.  ? Black and decaffeinated coffee.  ? Black and decaffeinated tea.  ? Bubbly (carbonated) soft drinks.  ? Caffeinated energy drinks and soft drinks.  · Limit high-fat foods such as:  ? Fatty meat or fried foods.  ? Whole milk, cream, butter, or ice cream.  ? Nuts and nut butters.  ? Pastries, donuts, and sweets made with butter or shortening.  · Avoid foods that cause symptoms.  These foods may be different for everyone. Common foods that cause symptoms include:  ? Tomatoes.  ? Oranges, jimi, and limes.  ? Peppers.  ? Spicy food.  ? Onions and garlic.  ? Vinegar.  Lifestyle  · Maintain a healthy weight. Ask your doctor what weight is healthy for you. If you need to lose weight, work with your doctor to do so safely.  · Exercise for at least 30 minutes for 5 or more days each week, or as told by your doctor.  · Wear loose-fitting clothes.  · Do not smoke. If you need help quitting, ask your doctor.  · Sleep with the head of your bed higher than your feet. Use a wedge under the mattress or blocks under the bed frame to raise the head of the bed.  Summary  · When you have gastroesophageal reflux disease (GERD), food and lifestyle choices are very important in easing your symptoms.  · Eat small meals often instead of 3 large meals a day. Eat your meals slowly, and in a place where you are relaxed.  · Limit high-fat foods such as fatty meat or fried foods.  · Avoid bending over or lying down until 2-3 hours after eating.  · Avoid peppermint and spearmint, caffeine, alcohol, and chocolate.  This information is not intended to replace advice given to you by your health care provider. Make sure you discuss any questions you have with your health care provider.  Document Released: 06/18/2013 Document Revised: 04/09/2020 Document Reviewed: 01/23/2018  [employer] Patient Education © 2020 [employer]    [place] Diet  A bland diet consists of foods that are often soft and do not have a lot of fat, fiber, or extra seasonings. Foods without fat, fiber, or seasoning are easier for the body to digest. They are also less likely to irritate your mouth, throat, stomach, and other parts of your digestive system. A bland diet is sometimes called a BRAT diet.  What is my plan?  Your health care provider or food and nutrition specialist (dietitian) may recommend specific changes to your diet to prevent symptoms or  to treat your symptoms. These changes may include:  · Eating small meals often.  · Cooking food until it is soft enough to chew easily.  · Chewing your food well.  · Drinking fluids slowly.  · Not eating foods that are very spicy, sour, or fatty.  · Not eating citrus fruits, such as oranges and grapefruit.  What do I need to know about this diet?  · Eat a variety of foods from the bland diet food list.  · Do not follow a bland diet longer than needed.  · Ask your health care provider whether you should take vitamins or supplements.  What foods can I eat?  Grains    Hot cereals, such as cream of wheat. Rice. Bread, crackers, or tortillas made from refined white flour.  Vegetables  Canned or cooked vegetables. Mashed or boiled potatoes.  Fruits    Bananas. Applesauce. Other types of cooked or canned fruit with the skin and seeds removed, such as canned peaches or pears.  Meats and other proteins    Scrambled eggs. Creamy peanut butter or other nut butters. Lean, well-cooked meats, such as chicken or fish. Tofu. Soups or broths.  Dairy  Low-fat dairy products, such as milk, cottage cheese, or yogurt.  Beverages    Water. Herbal tea. Apple juice.  Fats and oils  Mild salad dressings. [place] or olive oil.  Sweets and desserts  Pudding. Custard. Fruit gelatin. Ice cream.  The items listed above may not be a complete list of recommended foods and beverages. Contact a dietitian for more options.  What foods are not recommended?  Grains  Whole grain breads and cereals.  Vegetables  Raw vegetables.  Fruits  Raw fruits, especially citrus, berries, or dried fruits.  Dairy  Whole fat dairy foods.  Beverages  Caffeinated drinks. Alcohol.  Seasonings and condiments  Strongly flavored seasonings or condiments. Hot sauce. Salsa.  Other foods  Spicy foods. Fried foods. Sour foods, such as pickled or fermented foods. Foods with high sugar content. Foods high in fiber.  The items listed above may not be a complete list of foods and  beverages to avoid. Contact a dietitian for more information.  Summary  · A bland diet consists of foods that are often soft and do not have a lot of fat, fiber, or extra seasonings.  · Foods without fat, fiber, or seasoning are easier for the body to digest.  · Check with your health care provider to see how long you should follow this diet plan. It is not meant to be followed for long periods.  This information is not intended to replace advice given to you by your health care provider. Make sure you discuss any questions you have with your health care provider.  Document Released: 04/10/2017 Document Revised: 01/16/2019 Document Reviewed: 01/16/2019  [employer] Patient Education © 2020 [employer]

## 2020-09-01 ENCOUNTER — TELEPHONE (OUTPATIENT)
Dept: GASTROENTEROLOGY | Facility: CLINIC | Age: 55
End: 2020-09-01

## 2020-09-01 ENCOUNTER — OFFICE VISIT (OUTPATIENT)
Dept: HEMATOLOGY ONCOLOGY | Facility: CLINIC | Age: 55
End: 2020-09-01
Payer: COMMERCIAL

## 2020-09-01 VITALS
WEIGHT: 131.4 LBS | HEART RATE: 92 BPM | OXYGEN SATURATION: 100 % | BODY MASS INDEX: 21.12 KG/M2 | SYSTOLIC BLOOD PRESSURE: 124 MMHG | HEIGHT: 66 IN | RESPIRATION RATE: 18 BRPM | TEMPERATURE: 97.5 F | DIASTOLIC BLOOD PRESSURE: 72 MMHG

## 2020-09-01 DIAGNOSIS — D50.9 MICROCYTIC ANEMIA: ICD-10-CM

## 2020-09-01 DIAGNOSIS — D61.818 PANCYTOPENIA (HCC): Primary | ICD-10-CM

## 2020-09-01 DIAGNOSIS — E61.1 IRON DEFICIENCY: ICD-10-CM

## 2020-09-01 DIAGNOSIS — D69.6 THROMBOCYTOPENIA (HCC): ICD-10-CM

## 2020-09-01 PROCEDURE — 99215 OFFICE O/P EST HI 40 MIN: CPT | Performed by: INTERNAL MEDICINE

## 2020-09-01 RX ORDER — ALBUTEROL SULFATE 2.5 MG/3ML
2.5 SOLUTION RESPIRATORY (INHALATION) EVERY 6 HOURS PRN
COMMUNITY

## 2020-09-01 RX ORDER — SODIUM CHLORIDE 9 MG/ML
20 INJECTION, SOLUTION INTRAVENOUS ONCE
Status: CANCELLED | OUTPATIENT
Start: 2020-09-09

## 2020-09-01 NOTE — PROGRESS NOTES
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  Colonoscopy January 2020 showed 2 small polyps  He has no hematuria  Urinalysis has been negative for blood repeatedly  This argues against the possibility of [address] as an explanation for his iron deficiency  He had responded well to iron when replaced earlier this year  He did have some improvement in energy as well  We will make arrangements for Feraheme 510 mg IV x2 doses at this time  I discussed the above with the patient  The patient  voiced understanding and agreement   ______________________________________________________________________    Chief Complaint   Patient presents with    Follow-up       HPI:  Oncology History    No history exists  Interval History:  Clinically stable  ECOG-  1 - Symptomatic but completely ambulatory    Review of Systems   Constitutional: Negative for chills and fever  HENT: Negative for nosebleeds  Eyes: Negative for discharge  Respiratory: Negative for cough and shortness of breath  Cardiovascular: Negative for chest pain  Gastrointestinal: Negative for abdominal pain, constipation and diarrhea  Endocrine: Negative for polydipsia  Genitourinary: Negative for hematuria  Musculoskeletal: Negative for arthralgias  Skin: Negative for color change  Allergic/Immunologic: Negative for immunocompromised state  Neurological: Negative for dizziness and headaches  Hematological: Negative for adenopathy  Psychiatric/Behavioral: Negative for agitation         Past Medical History:   Diagnosis Date    Alcohol abuse     Traore esophagus     Bowel obstruction (HCC)     Bowel perforation (HCC)     Cardiac disease     Continuous chronic alcoholism ([facility] ) 10/5/2017    COPD (chronic obstructive pulmonary disease) (HCC)     History of shoulder surgery     Right shoulder    History of transfusion     Hx of cervical spine surgery     Hypertension     Incisional hernia 10/8/2017    MI, old     Mitral regurgitation     Psychiatric disorder     Seizures ([facility] [address] )      Patient Active Problem List   Diagnosis    Alcohol dependence ([facility] [address] )    Tobacco abuse    Essential hypertension    H/O suicide attempt    H/O cervical spine surgery    Melena    Cholelithiasis    Hyponatremia    Dietary folate deficiency anemia    Ventral hernia without obstruction or gangrene    Hepatomegaly    Hepatic steatosis    Hypomagnesemia    Elevated alkaline phosphatase level    Alcoholic hepatitis without ascites    Vitamin D deficiency    Iron deficiency    Urinary retention    Bladder wall thickening    Hypophosphatemia    Generalized weakness    Malnutrition of moderate degree (HCC)    Hypokalemia    Seizures (HCC)    Seizure (HCC)    Continuous chronic alcoholism (HCC)    Incisional hernia    Hx of seizure disorder    Seizure-like activity ([facility] [address] )    Diarrhea    Abscess of right leg    Abnormality of pancreatic duct    Alcohol abuse, daily use    Allergic rhinitis    Microcytic anemia    Abdominal wound dehiscence    Cervical disc disorder with myelopathy    Cervical spinal stenosis    Depression    Left foot drop    Lumbar radiculopathy    Neuropathy involving both lower extremities    Peripheral neuropathy    T6 vertebral fracture (HCC)    Alcoholic cirrhosis of liver without ascites ([facility] [address] )    Thrombocytopenia ([facility] [address] )    Closed fracture of left olecranon process, initial encounter    Aspiration pneumonia (HCC)    Iron deficiency anemia due to chronic blood loss    Duodenal ulcer    Tubular adenoma    Traore esophagus    Pancreatitis, alcoholic, acute    Celiac artery stenosis (HCC)    Pancreatic mass    Pancytopenia (HCC)    Tobacco use    Constipation    Transaminitis    Lesion of spleen    Left anterior fascicular block    Abnormal EKG       Current Outpatient Medications:     albuterol (2 5 mg/3 mL) 0 083 % nebulizer solution, Take 2 5 mg by nebulization every 6 (six) hours as needed for wheezing or shortness of breath, Disp: , Rfl:     albuterol (PROVENTIL HFA,VENTOLIN HFA) 90 mcg/act inhaler, Inhale 2 puffs every 4 (four) hours as needed for wheezing, Disp: 18 each, Rfl: 3    amitriptyline (ELAVIL) 25 mg tablet, Take 1 tablet (25 mg total) by mouth daily at bedtime as needed for sleep, Disp: 30 tablet, Rfl: 3    Cholecalciferol 25 MCG (1000 UT) tablet, Take 1 tablet (1,000 Units total) by mouth daily, Disp: 30 tablet, Rfl: 0    cyanocobalamin (VITAMIN B-12) 100 mcg tablet, Take 1 tablet (100 mcg total) by mouth daily, Disp: 30 tablet, Rfl: 5    docusate sodium (COLACE) 100 mg capsule, Take 1 capsule (100 mg total) by mouth 2 (two) times a day as needed for constipation, Disp: 30 capsule, Rfl: 0    folic acid (FOLVITE) 1 mg tablet, Take 1 tablet (1 mg total) by mouth daily, Disp: 30 tablet, Rfl: 0    gabapentin (NEURONTIN) 100 mg capsule, Take 1 capsule (100 mg total) by mouth 3 (three) times a day, Disp: 90 capsule, Rfl: 3    levETIRAcetam (KEPPRA) 1000 MG tablet, TAKE 1 TABLET BY MOUTH EVERY 12 HOURS, Disp: 60 tablet, Rfl: 0    lisinopril (ZESTRIL) 10 mg tablet, Take 1 tablet (10 mg total) by mouth daily, Disp: 30 tablet, Rfl: 5    magnesium oxide (MAG-OX) 400 mg, Take 1 tablet (400 mg total) by mouth 2 (two) times a day for 7 days, Disp: 14 tablet, Rfl: 0    Multiple Vitamin (TAB-A-BONI PO), Take 1 tablet by mouth daily, Disp: , Rfl:     oxyCODONE (ROXICODONE) 5 mg immediate release tablet, Take 1 tablet (5 mg total) by mouth every 4 (four) hours as needed for moderate pain or severe painMax Daily Amount: 30 mg, Disp: 5 tablet, Rfl: 0    pantoprazole (PROTONIX) 40 mg tablet, Take 1 tablet (40 mg total) by mouth daily before breakfast, Disp: 60 tablet, Rfl: 0    thiamine 100 MG tablet, Take 1 tablet (100 mg total) by mouth daily, Disp: 30 tablet, Rfl: 0  Allergies   Allergen Reactions    Cholestatin      Past Surgical History:   Procedure Laterality Date    APPENDECTOMY      BACK SURGERY      CHOLECYSTECTOMY      ESOPHAGOGASTRODUODENOSCOPY N/A 11/28/2016    Procedure: ESOPHAGOGASTRODUODENOSCOPY (EGD); Surgeon: Lexi Hyde MD;  Location:  GI LAB; Service:     GALLBLADDER SURGERY      LAPAROTOMY N/A 10/25/2016    Procedure: LAPAROTOMY EXPLORATORY;  Surgeon: Anshul Rodriguez MD;  Location: [facility];  Service:     MOUTH SURGERY      PERCUTANEOUS PINNING FEMORAL NECK FRACTURE      SHOULDER SURGERY Right     SHOULDER SURGERY      SMALL INTESTINE SURGERY      STOMACH SURGERY      bal surgery     Social History     Objective:  Vitals:    09/01/20 1017   BP: 124/72   BP Location: Right arm   Patient Position: Sitting   Pulse: 92   Resp: 18   Temp: 97 5 °F (36 4 °C)   TempSrc: Tympanic   SpO2: 100%   Weight: 59 6 kg (131 lb 6 4 oz)   Height: 5' 6" (1 676 m)     Physical Exam  Constitutional:       Appearance: He is well-developed  HENT:      Head: Normocephalic and atraumatic  Eyes:      Pupils: Pupils are equal, round, and reactive to light  Neck:      Musculoskeletal: Neck supple  Cardiovascular:      Rate and Rhythm: Normal rate and regular rhythm  Heart sounds: No murmur  Pulmonary:      Breath sounds: Normal breath sounds  No wheezing or rales  Abdominal:      Palpations: Abdomen is soft  Tenderness: There is no abdominal tenderness  Musculoskeletal: Normal range of motion  General: No tenderness  Lymphadenopathy:      Cervical: No cervical adenopathy  Skin:     Findings: No erythema or rash  Neurological:      Mental Status: He is alert and oriented to person, place, and time  Cranial Nerves: No cranial nerve deficit  Deep Tendon Reflexes: Reflexes are normal and symmetric  Psychiatric:         Behavior: Behavior normal            Labs: I personally reviewed the labs and imaging pertinent to this patient care

## 2020-09-08 ENCOUNTER — OFFICE VISIT (OUTPATIENT)
Dept: UROLOGY | Facility: CLINIC | Age: 55
End: 2020-09-08
Payer: COMMERCIAL

## 2020-09-08 VITALS
BODY MASS INDEX: 20.2 KG/M2 | WEIGHT: 125.66 LBS | TEMPERATURE: 98.9 F | HEIGHT: 66 IN | DIASTOLIC BLOOD PRESSURE: 58 MMHG | SYSTOLIC BLOOD PRESSURE: 120 MMHG | HEART RATE: 80 BPM

## 2020-09-08 DIAGNOSIS — Z12.5 SCREENING FOR PROSTATE CANCER: ICD-10-CM

## 2020-09-08 DIAGNOSIS — N32.89 BLADDER WALL THICKENING: Primary | ICD-10-CM

## 2020-09-08 LAB
SL AMB  POCT GLUCOSE, UA: NORMAL
SL AMB LEUKOCYTE ESTERASE,UA: NORMAL
SL AMB POCT BILIRUBIN,UA: NORMAL
SL AMB POCT BLOOD,UA: NORMAL
SL AMB POCT CLARITY,UA: CLEAR
SL AMB POCT COLOR,UA: YELLOW
SL AMB POCT KETONES,UA: NORMAL
SL AMB POCT NITRITE,UA: NORMAL
SL AMB POCT PH,UA: 5
SL AMB POCT SPECIFIC GRAVITY,UA: 1
SL AMB POCT URINE PROTEIN: NORMAL
SL AMB POCT UROBILINOGEN: 0.2

## 2020-09-08 PROCEDURE — 3078F DIAST BP <80 MM HG: CPT | Performed by: PHYSICIAN ASSISTANT

## 2020-09-08 PROCEDURE — 99204 OFFICE O/P NEW MOD 45 MIN: CPT | Performed by: PHYSICIAN ASSISTANT

## 2020-09-08 PROCEDURE — 81002 URINALYSIS NONAUTO W/O SCOPE: CPT | Performed by: PHYSICIAN ASSISTANT

## 2020-09-08 PROCEDURE — 3074F SYST BP LT 130 MM HG: CPT | Performed by: PHYSICIAN ASSISTANT

## 2020-09-08 NOTE — PROGRESS NOTES
9/8/2020      Chief Complaint   Patient presents with    New Patient Visit         Assessment and Plan    48 y o  male managed by  NEW PATIENT    1  Bladder wall thickening on CT  - urinalysis 08/20/2020 unremarkable  - repeat urine dip today unremarkable  - 80+ pack year tobacco use    2  Prostate cancer screening  - PSA ordered  - BRETT no nodule felt today    He had CT scan  He will go for repeat microscopic urine testing  He will return to the office for cystourethroscopy to evaluate bladder wall thickening in light of heavy tobacco use  Smoking cessation strongly encouraged  History of Present Illness  Brandon Hernandez is a 48 y o  male here for evaluation of hospital follow-up  Incidental finding of bladder wall thickening and bilateral ureteral fullness without hydronephrosis seen on CT scan performed 08/20/2020  Urinalysis with microscopic performed at that time was negative for blood nitrites or leukocytes  Was hospitalized for acute alcoholic pancreatitis at the time  He reports no urinary bother  He does admit to one episode of gross hematuria about 3 years ago that was short lived and did not seek evaluation  2 packs smoker per day for 40+ years  6 cans beer daily, no liquor      Review of Systems   Constitutional: Negative for activity change, appetite change, chills, fever and unexpected weight change  HENT: Negative  Respiratory: Negative  Negative for shortness of breath  Cardiovascular: Negative  Negative for chest pain  Gastrointestinal: Negative for abdominal pain, diarrhea, nausea and vomiting  Endocrine: Negative  Genitourinary: Negative for decreased urine volume, difficulty urinating, dysuria, flank pain, frequency, hematuria and urgency  Musculoskeletal: Negative for back pain and gait problem  Skin: Negative  Allergic/Immunologic: Negative  Neurological: Negative  Hematological: Negative for adenopathy  Does not bruise/bleed easily                  Past Medical History  Past Medical History:   Diagnosis Date    Alcohol abuse     Traore esophagus     Bowel obstruction (HCC)     Bowel perforation (HCC)     Cardiac disease     Continuous chronic alcoholism ([facility] ) 10/5/2017    COPD (chronic obstructive pulmonary disease) (HCC)     History of shoulder surgery     Right shoulder    History of transfusion     Hx of cervical spine surgery     Hypertension     Incisional hernia 10/8/2017    MI, old     Mitral regurgitation     Psychiatric disorder     Seizures ([facility] [address] )      Past Social History  Past Surgical History:   Procedure Laterality Date    APPENDECTOMY      BACK SURGERY      CHOLECYSTECTOMY      ESOPHAGOGASTRODUODENOSCOPY N/A 11/28/2016    Procedure: ESOPHAGOGASTRODUODENOSCOPY (EGD); Surgeon: Marguerite Kelley MD;  Location:  GI LAB;   Service:    [address]      LAPAROTOMY N/A 10/25/2016    Procedure: LAPAROTOMY EXPLORATORY;  Surgeon: Melissa Lynn MD;  Location: [facility];  Service:    [employer] MOUTH SURGERY      PERCUTANEOUS PINNING FEMORAL NECK FRACTURE      SHOULDER SURGERY Right     SHOULDER SURGERY      SMALL INTESTINE SURGERY      STOMACH SURGERY      bal surgery     Social History     Tobacco Use   Smoking Status Current Every Day Smoker    Packs/day: 2 00    Years: 15 00    Pack years: 30 00    Types: Cigarettes   Smokeless Tobacco Never Used     Past Family History  Family History   Problem Relation Age of Onset    Breast cancer Mother     Prostate cancer Father     Skin cancer Brother      Past Social history  Social History     Socioeconomic History    Marital status: Single     Spouse name: Not on file    Number of children: Not on file    Years of education: Not on file    Highest education level: Not on file   Occupational History    Not on file   Social Needs    Financial resource strain: Not on file    Food insecurity     Worry: Not on file     Inability: Not on file   [employer] needs Medical: Not on file     Non-medical: Not on file   Tobacco Use    Smoking status: Current Every Day Smoker     Packs/day: 2 00     Years: 15 00     Pack years: 30 00     Types: Cigarettes    Smokeless tobacco: Never Used   Substance and Sexual Activity    Alcohol use:  Yes     Alcohol/week: 6 0 standard drinks     Types: 6 Cans of beer per week     Frequency: 4 or more times a week     Drinks per session: 5 or 6     Binge frequency: Monthly     Comment: 6 12oz cans a day    Drug use: No    Sexual activity: Yes   Lifestyle    Physical activity     Days per week: Not on file     Minutes per session: Not on file    Stress: Not on file   Relationships    Social connections     Talks on phone: Not on file     Gets together: Not on file     Attends [employer] service: Not on file     Active member of club or organization: Not on file     Attends meetings of clubs or organizations: Not on file     Relationship status: Not on file    Intimate partner violence     Fear of current or ex partner: Not on file     Emotionally abused: Not on file     Physically abused: Not on file     Forced sexual activity: Not on file   Other Topics Concern    Not on file   Social History Narrative    Not on file     Current Medications  Current Outpatient Medications   Medication Sig Dispense Refill    albuterol (PROVENTIL HFA,VENTOLIN HFA) 90 mcg/act inhaler Inhale 2 puffs every 4 (four) hours as needed for wheezing 18 each 3    amitriptyline (ELAVIL) 25 mg tablet Take 1 tablet (25 mg total) by mouth daily at bedtime as needed for sleep 30 tablet 3    Cholecalciferol 25 MCG (1000 UT) tablet Take 1 tablet (1,000 Units total) by mouth daily 30 tablet 0    cyanocobalamin (VITAMIN B-12) 100 mcg tablet Take 1 tablet (100 mcg total) by mouth daily 30 tablet 5    docusate sodium (COLACE) 100 mg capsule Take 1 capsule (100 mg total) by mouth 2 (two) times a day as needed for constipation 30 capsule 0    folic acid (FOLVITE) 1 mg tablet Take 1 tablet (1 mg total) by mouth daily 30 tablet 0    gabapentin (NEURONTIN) 100 mg capsule Take 1 capsule (100 mg total) by mouth 3 (three) times a day 90 capsule 3    levETIRAcetam (KEPPRA) 1000 MG tablet TAKE 1 TABLET BY MOUTH EVERY 12 HOURS 60 tablet 0    lisinopril (ZESTRIL) 10 mg tablet Take 1 tablet (10 mg total) by mouth daily 30 tablet 5    magnesium oxide (MAG-OX) 400 mg Take 1 tablet (400 mg total) by mouth 2 (two) times a day for 7 days 14 tablet 0    Multiple Vitamin (TAB-A-BONI PO) Take 1 tablet by mouth daily      pantoprazole (PROTONIX) 40 mg tablet Take 1 tablet (40 mg total) by mouth daily before breakfast 60 tablet 0    thiamine 100 MG tablet Take 1 tablet (100 mg total) by mouth daily 30 tablet 0    albuterol (2 5 mg/3 mL) 0 083 % nebulizer solution Take 2 5 mg by nebulization every 6 (six) hours as needed for wheezing or shortness of breath      oxyCODONE (ROXICODONE) 5 mg immediate release tablet Take 1 tablet (5 mg total) by mouth every 4 (four) hours as needed for moderate pain or severe painMax Daily Amount: 30 mg (Patient not taking: Reported on 9/8/2020) 5 tablet 0     No current facility-administered medications for this visit  Allergies  Allergies   Allergen Reactions    Cholestatin          The following portions of the patient's history were reviewed and updated as appropriate: allergies, current medications, past medical history, past social history, past surgical history and problem list       Vitals  Vitals:    09/08/20 1255   BP: 120/58   BP Location: Left arm   Patient Position: Sitting   Cuff Size: Adult   Pulse: 80   Temp: 98 9 °F (37 2 °C)   Weight: 57 kg (125 lb 10 6 oz)   Height: 5' 6" (1 676 m)       Physical Exam  Vitals signs and nursing note reviewed  Constitutional:       General: He is not in acute distress  Appearance: Normal appearance  He is well-developed  He is not diaphoretic        Comments: Appears older than stated age, odor of cigarettes   HENT:      Head: Normocephalic and atraumatic  Pulmonary:      Effort: Pulmonary effort is normal       Comments: No cough or audible wheeze  Abdominal:      General: There is no distension  Tenderness: There is no abdominal tenderness  There is no right CVA tenderness or left CVA tenderness  Genitourinary:     Comments: Circumcised penis, normal phallus, orthotopic patent meatus  Testes smooth descended bilaterally into the scrotum without tenderness or mass  BRETT smooth small prostate without appreciable nodule  Musculoskeletal:      Right lower leg: No edema  Left lower leg: No edema  Skin:     General: Skin is warm and dry  Neurological:      Mental Status: He is alert and oriented to person, place, and time  Gait: Gait normal    Psychiatric:         Speech: Speech normal          Behavior: Behavior normal            Results  No results found for this or any previous visit (from the past 1 hour(s))  ]  No results found for: PSA  Lab Results   Component Value Date    GLUCOSE 149 (H) 07/28/2017    CALCIUM 9 8 08/31/2020     12/31/2015    K 4 7 08/31/2020    CO2 28 08/31/2020    CL 97 (L) 08/31/2020    BUN 6 08/31/2020    CREATININE 0 78 08/31/2020     Lab Results   Component Value Date    WBC 6 70 08/31/2020    HGB 10 3 (L) 08/31/2020    HCT 35 4 (L) 08/31/2020    MCV 82 08/31/2020     (H) 08/31/2020         Orders  Orders Placed This Encounter   Procedures    PSA, Total Screen     This is a patient instruction: This test is non-fasting  Please drink two glasses of water morning of bloodwork          Standing Status:   Future     Standing Expiration Date:   9/8/2021    POCT urine dip    Cystoscopy     Standing Status:   Future     Standing Expiration Date:   9/8/2021

## 2020-09-09 ENCOUNTER — PREP FOR PROCEDURE (OUTPATIENT)
Dept: GASTROENTEROLOGY | Facility: CLINIC | Age: 55
End: 2020-09-09

## 2020-09-09 DIAGNOSIS — K86.89 PANCREATIC MASS: Primary | ICD-10-CM

## 2020-09-09 PROBLEM — K80.20 CHOLELITHIASIS: Status: RESOLVED | Noted: 2017-01-11 | Resolved: 2020-09-09

## 2020-09-09 NOTE — TELEPHONE ENCOUNTER
EUS scheduled on 9/16/20 with Dr Alvares at [facility] gave patient verbal instructions/mailed

## 2020-09-11 ENCOUNTER — TELEPHONE (OUTPATIENT)
Dept: VASCULAR SURGERY | Facility: CLINIC | Age: 55
End: 2020-09-11

## 2020-09-14 ENCOUNTER — CONSULT (OUTPATIENT)
Dept: VASCULAR SURGERY | Facility: HOSPITAL | Age: 55
End: 2020-09-14
Attending: INTERNAL MEDICINE
Payer: COMMERCIAL

## 2020-09-14 VITALS
DIASTOLIC BLOOD PRESSURE: 99 MMHG | WEIGHT: 128.8 LBS | SYSTOLIC BLOOD PRESSURE: 149 MMHG | BODY MASS INDEX: 20.7 KG/M2 | HEIGHT: 66 IN | HEART RATE: 101 BPM | TEMPERATURE: 95.9 F

## 2020-09-14 DIAGNOSIS — I77.1 CELIAC ARTERY STENOSIS (HCC): Primary | ICD-10-CM

## 2020-09-14 PROCEDURE — 99243 OFF/OP CNSLTJ NEW/EST LOW 30: CPT | Performed by: SURGERY

## 2020-09-14 PROCEDURE — 4004F PT TOBACCO SCREEN RCVD TLK: CPT | Performed by: SURGERY

## 2020-09-14 NOTE — PATIENT INSTRUCTIONS
Recommend eating smaller more frequent meals  Recommend abstinence from alcohol  Recommend smoking cessation

## 2020-09-14 NOTE — ASSESSMENT & PLAN NOTE
Asymptomatic celiac artery stenosis  SMA is widely patent  There is no evidence of visceral malperfusion  There is no indication for any vascular intervention  Patient with history of peptic ulcer disease status post exploratory laparotomy in the past   Patient with history of cholecystectomy  Patient is a heavy smoker and drinker  His primary complaint has been bloating  He is scheduled for an EGD later this week  Will get a 6 month mesenteric duplex to reassess for progression of disease

## 2020-09-14 NOTE — PROGRESS NOTES
Assessment/Plan:    Celiac artery stenosis (HCC)  Asymptomatic celiac artery stenosis  SMA is widely patent  There is no evidence of visceral malperfusion  There is no indication for any vascular intervention  Patient with history of peptic ulcer disease status post exploratory laparotomy in the past   Patient with history of cholecystectomy  Patient is a heavy smoker and drinker  His primary complaint has been bloating  He is scheduled for an EGD later this week  Will get a 6 month mesenteric duplex to reassess for progression of disease  Diagnoses and all orders for this visit:    Celiac artery stenosis ([address] )  -     Ambulatory referral to Vascular Surgery  -     VAS celiac and/or mesenteric duplex; complete study; Future          Subjective:      Patient ID: Marye Goldmann is a 48 y o  male  Patient is new to us referred by Dr Agueda Bradley  Patient had a CTA of the abdomen, pelvis w/ contrast done on 8/20/20  Patient C/o of stomach bloating that he noticed in the hospital   Patient denies any other symptoms  Currently taking Lisinopril  Patient currently smokes 2 PPD  Anisa Carroll is a 72-year-old gentleman with extensive smoking and drinking history who was referred to our office for incidental finding of celiac artery stenosis on CT imaging  His chief complaint has been bloating  He has history of peptic ulcer disease status post exploratory laparotomy in the past   He has history of cholecystectomy  He denies any significant weight loss  He denies any postprandial abdominal pain  He is scheduled for an EGD later this week  The following portions of the patient's history were reviewed and updated as appropriate: allergies, current medications, past family history, past medical history, past social history, past surgical history and problem list     Review of Systems   Constitutional: Negative  Negative for chills and fever  HENT: Negative  Eyes: Negative      Respiratory: Positive for shortness of breath (from COPD )  Negative for chest tightness  Cardiovascular: Negative  Negative for chest pain  Gastrointestinal: Positive for abdominal distention (Bloating )  Negative for abdominal pain  Endocrine: Negative  Genitourinary: Negative  Negative for flank pain  Musculoskeletal: Negative  Negative for back pain  Skin: Negative  Allergic/Immunologic: Negative  Neurological: Negative  Hematological: Negative  Psychiatric/Behavioral: Negative  I have personally reviewed the ROS entered by MA and agree as documented  Objective:      /99 (BP Location: Left arm, Patient Position: Sitting, Cuff Size: Standard)   Pulse 101   Temp (!) 95 9 °F (35 5 °C) (Tympanic)   Ht 5' 6" (1 676 m)   Wt 58 4 kg (128 lb 12 8 oz)   BMI 20 79 kg/m²          Physical Exam  Constitutional:       General: He is not in acute distress  Appearance: He is well-developed  HENT:      Head: Normocephalic and atraumatic  Eyes:      General: No scleral icterus  Conjunctiva/sclera: Conjunctivae normal    Neck:      Musculoskeletal: Normal range of motion and neck supple  Trachea: No tracheal deviation  Cardiovascular:      Rate and Rhythm: Normal rate and regular rhythm  Heart sounds: Normal heart sounds  Pulmonary:      Effort: Pulmonary effort is normal       Breath sounds: Normal breath sounds  Abdominal:      General: Bowel sounds are normal  There is no distension  Palpations: Abdomen is soft  There is no mass (no appreciable aortic pulsation/aneurysm)  Tenderness: There is no abdominal tenderness  There is no guarding or rebound  Hernia: A hernia is present  Comments: Well-healed midline surgical incision   Musculoskeletal: Normal range of motion  Skin:     General: Skin is warm and dry  Neurological:      Mental Status: He is alert and oriented to person, place, and time     Psychiatric:         Mood and Affect: Mood normal          Behavior: Behavior normal          Thought Content:  Thought content normal          Judgment: Judgment normal

## 2020-09-15 ENCOUNTER — ANESTHESIA EVENT (OUTPATIENT)
Dept: GASTROENTEROLOGY | Facility: HOSPITAL | Age: 55
End: 2020-09-15

## 2020-09-15 ENCOUNTER — TELEPHONE (OUTPATIENT)
Dept: HEMATOLOGY ONCOLOGY | Facility: CLINIC | Age: 55
End: 2020-09-15

## 2020-09-15 RX ORDER — SODIUM CHLORIDE 9 MG/ML
20 INJECTION, SOLUTION INTRAVENOUS ONCE
Status: CANCELLED | OUTPATIENT
Start: 2020-09-17

## 2020-09-15 NOTE — ANESTHESIA PREPROCEDURE EVALUATION
Procedure:  ENDOSCOPIC ULTRASOUND (UPPER)    Relevant Problems   CARDIO   (+) Essential hypertension   (+) Left anterior fascicular block      GI/HEPATIC  Celiac artery stenosis   (+) Abnormality of pancreatic duct   (+) Alcoholic cirrhosis of liver without ascites (HCC)   (+) Alcoholic hepatitis without ascites   (+) Duodenal ulcer   (+) Hepatic steatosis   (+) Hepatomegaly   (+) Pancreatitis, alcoholic, acute      HEMATOLOGY   (+) Dietary folate deficiency anemia   (+) Iron deficiency anemia due to chronic blood loss   (+) Microcytic anemia   (+) Thrombocytopenia (HCC)      NEURO/PSYCH   (+) Depression   (+) H/O suicide attempt   (+) Hx of seizure disorder   (+) Seizure (HCC)   (+) Seizures (HCC)      PULMONARY   (+) Aspiration pneumonia (HCC)      Other   (+) Lesion of spleen        CTA C/A/P 8/20/2020:  IMPRESSION:     1  No acute pulmonary embolism      2  Acute pancreatitis      3  Pancreatic head cysts similar in size compared to the prior MRI  While these may represent pseudocysts, there were suspicious features reported on MRI and endoscopic ultrasound was recommended      4   Bladder wall thickening with bilateral ureteral fullness of uncertain etiology, not present previously  No hydronephrosis  Correlation with urinalysis recommended      5  Diffuse hepatic steatosis     6   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 with poststenotic dilatation  Physical Exam    Airway  Comment: Limited extension 2/2 rods in neck  Mallampati score: II  TM Distance: >3 FB  Neck ROM: limited     Dental   Comment: edentulous,     Cardiovascular  Rate: normal, No murmur, Cardiovascular exam normal    Pulmonary  Pulmonary exam normal Breath sounds clear to auscultation,     Other Findings        Anesthesia Plan  ASA Score- 3     Anesthesia Type- IV sedation with anesthesia with ASA Monitors  Additional Monitors:   Airway Plan:           Plan Factors-Exercise tolerance (METS): >4 METS  Chart reviewed   EKG reviewed  Imaging results reviewed  Existing labs reviewed  Patient summary reviewed  Patient is a current smoker  Patient not instructed to abstain from smoking on day of procedure  Patient smoked on day of surgery  Obstructive sleep apnea risk education given perioperatively  Induction- intravenous  Postoperative Plan-     Informed Consent- Anesthetic plan and risks discussed with patient  I personally reviewed this patient with the CRNA  Discussed and agreed on the Anesthesia Plan with the CRNA  Karrie Nissen

## 2020-09-15 NOTE — TELEPHONE ENCOUNTER
Discussed the request from insurance to change treatment plan to Venofer  Dr Chase Luna was in agreement to make the change  Spoke with [employer] and also called Lien Kang to notify him of this change

## 2020-09-16 ENCOUNTER — ANESTHESIA (OUTPATIENT)
Dept: GASTROENTEROLOGY | Facility: HOSPITAL | Age: 55
End: 2020-09-16

## 2020-09-16 ENCOUNTER — HOSPITAL ENCOUNTER (OUTPATIENT)
Dept: GASTROENTEROLOGY | Facility: HOSPITAL | Age: 55
Setting detail: OUTPATIENT SURGERY
Discharge: HOME | End: 2020-09-16
Attending: INTERNAL MEDICINE
Payer: COMMERCIAL

## 2020-09-16 VITALS
RESPIRATION RATE: 18 BRPM | BODY MASS INDEX: 20.57 KG/M2 | OXYGEN SATURATION: 99 % | TEMPERATURE: 98.3 F | HEART RATE: 114 BPM | WEIGHT: 128 LBS | HEIGHT: 66 IN | DIASTOLIC BLOOD PRESSURE: 77 MMHG | SYSTOLIC BLOOD PRESSURE: 114 MMHG

## 2020-09-16 VITALS — HEART RATE: 105 BPM

## 2020-09-16 DIAGNOSIS — K70.30 ALCOHOLIC CIRRHOSIS OF LIVER WITHOUT ASCITES (HCC): ICD-10-CM

## 2020-09-16 DIAGNOSIS — K85.20 ALCOHOL-INDUCED ACUTE PANCREATITIS, UNSPECIFIED COMPLICATION STATUS: Primary | ICD-10-CM

## 2020-09-16 DIAGNOSIS — K86.89 PANCREATIC MASS: ICD-10-CM

## 2020-09-16 PROCEDURE — 43237 ENDOSCOPIC US EXAM ESOPH: CPT | Performed by: INTERNAL MEDICINE

## 2020-09-16 RX ORDER — LIDOCAINE HYDROCHLORIDE 10 MG/ML
INJECTION, SOLUTION EPIDURAL; INFILTRATION; INTRACAUDAL; PERINEURAL AS NEEDED
Status: DISCONTINUED | OUTPATIENT
Start: 2020-09-16 | End: 2020-09-16

## 2020-09-16 RX ORDER — KETAMINE HYDROCHLORIDE 50 MG/ML
INJECTION, SOLUTION, CONCENTRATE INTRAMUSCULAR; INTRAVENOUS AS NEEDED
Status: DISCONTINUED | OUTPATIENT
Start: 2020-09-16 | End: 2020-09-16

## 2020-09-16 RX ORDER — FENTANYL CITRATE 50 UG/ML
INJECTION, SOLUTION INTRAMUSCULAR; INTRAVENOUS AS NEEDED
Status: DISCONTINUED | OUTPATIENT
Start: 2020-09-16 | End: 2020-09-16

## 2020-09-16 RX ORDER — PROPOFOL 10 MG/ML
INJECTION, EMULSION INTRAVENOUS CONTINUOUS PRN
Status: DISCONTINUED | OUTPATIENT
Start: 2020-09-16 | End: 2020-09-16

## 2020-09-16 RX ORDER — SODIUM CHLORIDE 9 MG/ML
125 INJECTION, SOLUTION INTRAVENOUS CONTINUOUS
Status: DISCONTINUED | OUTPATIENT
Start: 2020-09-16 | End: 2020-09-20 | Stop reason: HOSPADM

## 2020-09-16 RX ORDER — PROPOFOL 10 MG/ML
INJECTION, EMULSION INTRAVENOUS AS NEEDED
Status: DISCONTINUED | OUTPATIENT
Start: 2020-09-16 | End: 2020-09-16

## 2020-09-16 RX ORDER — SODIUM CHLORIDE 9 MG/ML
INJECTION, SOLUTION INTRAVENOUS CONTINUOUS PRN
Status: DISCONTINUED | OUTPATIENT
Start: 2020-09-16 | End: 2020-09-16

## 2020-09-16 RX ADMIN — SODIUM CHLORIDE 125 ML/HR: 0.9 INJECTION, SOLUTION INTRAVENOUS at 08:15

## 2020-09-16 RX ADMIN — PROPOFOL 120 MCG/KG/MIN: 10 INJECTION, EMULSION INTRAVENOUS at 08:59

## 2020-09-16 RX ADMIN — FENTANYL CITRATE 25 MCG: 50 INJECTION, SOLUTION INTRAMUSCULAR; INTRAVENOUS at 08:59

## 2020-09-16 RX ADMIN — LIDOCAINE HYDROCHLORIDE 50 MG: 10 INJECTION, SOLUTION EPIDURAL; INFILTRATION; INTRACAUDAL; PERINEURAL at 08:59

## 2020-09-16 RX ADMIN — PROPOFOL 50 MG: 10 INJECTION, EMULSION INTRAVENOUS at 08:59

## 2020-09-16 RX ADMIN — PROPOFOL 30 MG: 10 INJECTION, EMULSION INTRAVENOUS at 09:02

## 2020-09-16 RX ADMIN — KETAMINE HYDROCHLORIDE 5 MG: 50 INJECTION, SOLUTION INTRAMUSCULAR; INTRAVENOUS at 09:11

## 2020-09-16 RX ADMIN — KETAMINE HYDROCHLORIDE 20 MG: 50 INJECTION, SOLUTION INTRAMUSCULAR; INTRAVENOUS at 08:59

## 2020-09-16 RX ADMIN — FENTANYL CITRATE 25 MCG: 50 INJECTION, SOLUTION INTRAMUSCULAR; INTRAVENOUS at 09:02

## 2020-09-16 RX ADMIN — SODIUM CHLORIDE: 9 INJECTION, SOLUTION INTRAVENOUS at 08:53

## 2020-09-16 RX ADMIN — FENTANYL CITRATE 25 MCG: 50 INJECTION, SOLUTION INTRAMUSCULAR; INTRAVENOUS at 09:11

## 2020-09-16 RX ADMIN — FENTANYL CITRATE 25 MCG: 50 INJECTION, SOLUTION INTRAMUSCULAR; INTRAVENOUS at 09:10

## 2020-09-16 RX ADMIN — PROPOFOL 30 MG: 10 INJECTION, EMULSION INTRAVENOUS at 09:14

## 2020-09-16 RX ADMIN — PROPOFOL 20 MG: 10 INJECTION, EMULSION INTRAVENOUS at 09:01

## 2020-09-16 NOTE — ANESTHESIA POSTPROCEDURE EVALUATION
Post-Op Assessment Note    CV Status:  Stable  Pain Score: 0    Pain management: adequate     Mental Status:  Alert and awake   Hydration Status:  Euvolemic   PONV Controlled:  Controlled   Airway Patency:  Patent      Post Op Vitals Reviewed: Yes      Staff: Anesthesiologist, CRNA         No complications documented      BP (!) 89/64 (09/16/20 0929)    Temp      Pulse 99 (09/16/20 0929)   Resp 18 (09/16/20 0929)    SpO2 97 % (09/16/20 0929)

## 2020-09-16 NOTE — H&P
History and Physical - [facility]  Marye Goldmann 48 y o  male MRN: [id number]                  HPI: Marye Goldmann is a 48y o  year old male who presents for EGD/eus      REVIEW OF SYSTEMS: Per the HPI, and otherwise unremarkable  Historical Information   Past Medical History:   Diagnosis Date    Alcohol abuse     Traore esophagus     Bowel obstruction (HCC)     Bowel perforation (HCC)     Cardiac disease     Continuous chronic alcoholism ([facility] [address] ) 10/5/2017    COPD (chronic obstructive pulmonary disease) (HCC)     History of shoulder surgery     Right shoulder    History of transfusion     Hx of cervical spine surgery     Hypertension     Incisional hernia 10/8/2017    MI, old     Mitral regurgitation     Psychiatric disorder     Seizures ([facility] [address] )      Past Surgical History:   Procedure Laterality Date    APPENDECTOMY      BACK SURGERY      CHOLECYSTECTOMY      ESOPHAGOGASTRODUODENOSCOPY N/A 11/28/2016    Procedure: ESOPHAGOGASTRODUODENOSCOPY (EGD); Surgeon: Tory Griffin MD;  Location:  GI LAB;   Service:    [address]      LAPAROTOMY N/A 10/25/2016    Procedure: LAPAROTOMY EXPLORATORY;  Surgeon: Elyssa Reddy MD;  Location: [facility];  Service:     MOUTH SURGERY      PERCUTANEOUS PINNING FEMORAL NECK FRACTURE      SHOULDER SURGERY Right     SHOULDER SURGERY      SMALL INTESTINE SURGERY      STOMACH SURGERY      bal surgery     Social History   Social History     Substance and Sexual Activity   Alcohol Use Yes    Alcohol/week: 6 0 standard drinks    Types: 6 Cans of beer per week    Frequency: 4 or more times a week    Drinks per session: 5 or 6    Binge frequency: Monthly    Comment: 6 12oz cans a day     Social History     Substance and Sexual Activity   Drug Use No     Social History     Tobacco Use   Smoking Status Current Every Day Smoker    Packs/day: 2 00    Years: 15 00    Pack years: 30 00    Types: Cigarettes   Smokeless Tobacco Never Used     Family History   Problem Relation Age of Onset    Breast cancer Mother     Prostate cancer Father     Skin cancer Brother        Meds/Allergies     (Not in a hospital admission)      No Known Allergies    Objective     /97   Pulse 104   Temp 98 3 °F (36 8 °C) (Oral)   Resp 18   Ht 5' 6" (1 676 m)   Wt 58 1 kg (128 lb)   SpO2 97%   BMI 20 66 kg/m²       PHYSICAL EXAM    Gen: NAD  CV: RRR  CHEST: Clear  ABD: soft, NT/ND  EXT: no edema      ASSESSMENT/PLAN:  This is a 48y o  year old male here for EGD/eus, and he is stable and optimized for his procedure

## 2020-09-17 ENCOUNTER — HOSPITAL ENCOUNTER (OUTPATIENT)
Dept: INFUSION CENTER | Facility: HOSPITAL | Age: 55
Discharge: HOME | End: 2020-09-17
Attending: INTERNAL MEDICINE
Payer: COMMERCIAL

## 2020-09-17 VITALS
RESPIRATION RATE: 16 BRPM | HEART RATE: 70 BPM | TEMPERATURE: 97 F | DIASTOLIC BLOOD PRESSURE: 86 MMHG | OXYGEN SATURATION: 98 % | SYSTOLIC BLOOD PRESSURE: 146 MMHG

## 2020-09-17 DIAGNOSIS — D50.0 IRON DEFICIENCY ANEMIA DUE TO CHRONIC BLOOD LOSS: Primary | ICD-10-CM

## 2020-09-17 PROCEDURE — 96366 THER/PROPH/DIAG IV INF ADDON: CPT

## 2020-09-17 PROCEDURE — 96365 THER/PROPH/DIAG IV INF INIT: CPT

## 2020-09-17 RX ORDER — SODIUM CHLORIDE 9 MG/ML
20 INJECTION, SOLUTION INTRAVENOUS ONCE
Status: COMPLETED | OUTPATIENT
Start: 2020-09-17 | End: 2020-09-17

## 2020-09-17 RX ORDER — SODIUM CHLORIDE 9 MG/ML
20 INJECTION, SOLUTION INTRAVENOUS ONCE
Status: CANCELLED | OUTPATIENT
Start: 2020-09-24

## 2020-09-17 RX ADMIN — IRON SUCROSE 300 MG: 20 INJECTION, SOLUTION INTRAVENOUS at 13:53

## 2020-09-17 RX ADMIN — SODIUM CHLORIDE 20 ML/HR: 0.9 INJECTION, SOLUTION INTRAVENOUS at 13:51

## 2020-09-24 ENCOUNTER — HOSPITAL ENCOUNTER (OUTPATIENT)
Dept: INFUSION CENTER | Facility: HOSPITAL | Age: 55
Discharge: HOME | End: 2020-09-24
Attending: INTERNAL MEDICINE
Payer: COMMERCIAL

## 2020-09-24 VITALS
RESPIRATION RATE: 16 BRPM | HEART RATE: 97 BPM | TEMPERATURE: 97.3 F | DIASTOLIC BLOOD PRESSURE: 89 MMHG | SYSTOLIC BLOOD PRESSURE: 135 MMHG

## 2020-09-24 DIAGNOSIS — D50.0 IRON DEFICIENCY ANEMIA DUE TO CHRONIC BLOOD LOSS: Primary | ICD-10-CM

## 2020-09-24 PROCEDURE — 96366 THER/PROPH/DIAG IV INF ADDON: CPT

## 2020-09-24 PROCEDURE — 96365 THER/PROPH/DIAG IV INF INIT: CPT

## 2020-09-24 RX ORDER — SODIUM CHLORIDE 9 MG/ML
20 INJECTION, SOLUTION INTRAVENOUS ONCE
Status: CANCELLED | OUTPATIENT
Start: 2020-10-01

## 2020-09-24 RX ORDER — SODIUM CHLORIDE 9 MG/ML
20 INJECTION, SOLUTION INTRAVENOUS ONCE
Status: COMPLETED | OUTPATIENT
Start: 2020-09-24 | End: 2020-09-24

## 2020-09-24 RX ADMIN — SODIUM CHLORIDE 20 ML/HR: 0.9 INJECTION, SOLUTION INTRAVENOUS at 13:27

## 2020-09-24 RX ADMIN — IRON SUCROSE 300 MG: 20 INJECTION, SOLUTION INTRAVENOUS at 13:22

## 2020-10-01 ENCOUNTER — HOSPITAL ENCOUNTER (OUTPATIENT)
Dept: INFUSION CENTER | Facility: HOSPITAL | Age: 55
Discharge: HOME | End: 2020-10-01
Attending: INTERNAL MEDICINE
Payer: COMMERCIAL

## 2020-10-01 VITALS
TEMPERATURE: 97 F | SYSTOLIC BLOOD PRESSURE: 155 MMHG | OXYGEN SATURATION: 94 % | RESPIRATION RATE: 18 BRPM | HEART RATE: 94 BPM | DIASTOLIC BLOOD PRESSURE: 97 MMHG

## 2020-10-01 DIAGNOSIS — D50.0 IRON DEFICIENCY ANEMIA DUE TO CHRONIC BLOOD LOSS: Primary | ICD-10-CM

## 2020-10-01 PROCEDURE — 96365 THER/PROPH/DIAG IV INF INIT: CPT

## 2020-10-01 RX ORDER — SODIUM CHLORIDE 9 MG/ML
20 INJECTION, SOLUTION INTRAVENOUS ONCE
Status: CANCELLED | OUTPATIENT
Start: 2020-10-08

## 2020-10-01 RX ORDER — SODIUM CHLORIDE 9 MG/ML
20 INJECTION, SOLUTION INTRAVENOUS ONCE
Status: COMPLETED | OUTPATIENT
Start: 2020-10-01 | End: 2020-10-01

## 2020-10-01 RX ADMIN — IRON SUCROSE 300 MG: 20 INJECTION, SOLUTION INTRAVENOUS at 11:02

## 2020-10-01 RX ADMIN — SODIUM CHLORIDE 20 ML/HR: 0.9 INJECTION, SOLUTION INTRAVENOUS at 10:59

## 2020-10-17 DIAGNOSIS — K29.70 GASTRITIS: ICD-10-CM

## 2020-10-19 DIAGNOSIS — K29.70 GASTRITIS: ICD-10-CM

## 2020-10-19 RX ORDER — PANTOPRAZOLE SODIUM 40 MG/1
TABLET, DELAYED RELEASE ORAL
Qty: 60 TABLET | Refills: 5 | Status: SHIPPED | OUTPATIENT
Start: 2020-10-19 | End: 2020-10-19 | Stop reason: SDUPTHER

## 2020-10-19 RX ORDER — PANTOPRAZOLE SODIUM 40 MG/1
40 TABLET, DELAYED RELEASE ORAL
Qty: 60 TABLET | Refills: 5 | Status: SHIPPED | OUTPATIENT
Start: 2020-10-19 | End: 2020-10-28

## 2020-10-20 ENCOUNTER — LAB (OUTPATIENT)
Dept: LAB | Facility: MEDICAL CENTER | Age: 55
End: 2020-10-20
Payer: COMMERCIAL

## 2020-10-20 DIAGNOSIS — Z12.5 SCREENING FOR PROSTATE CANCER: ICD-10-CM

## 2020-10-20 LAB — PSA SERPL-MCNC: 1.1 NG/ML (ref 0–4)

## 2020-10-20 PROCEDURE — G0103 PSA SCREENING: HCPCS

## 2020-10-20 PROCEDURE — 36415 COLL VENOUS BLD VENIPUNCTURE: CPT

## 2020-10-28 ENCOUNTER — OFFICE VISIT (OUTPATIENT)
Dept: GASTROENTEROLOGY | Facility: CLINIC | Age: 55
End: 2020-10-28
Payer: COMMERCIAL

## 2020-10-28 VITALS
SYSTOLIC BLOOD PRESSURE: 155 MMHG | HEART RATE: 88 BPM | BODY MASS INDEX: 20.57 KG/M2 | HEIGHT: 66 IN | DIASTOLIC BLOOD PRESSURE: 98 MMHG | TEMPERATURE: 97.9 F | WEIGHT: 128 LBS

## 2020-10-28 DIAGNOSIS — K21.9 GASTROESOPHAGEAL REFLUX DISEASE, UNSPECIFIED WHETHER ESOPHAGITIS PRESENT: ICD-10-CM

## 2020-10-28 DIAGNOSIS — K85.20 ALCOHOL-INDUCED ACUTE PANCREATITIS, UNSPECIFIED COMPLICATION STATUS: Primary | ICD-10-CM

## 2020-10-28 PROCEDURE — 3080F DIAST BP >= 90 MM HG: CPT | Performed by: PHYSICIAN ASSISTANT

## 2020-10-28 PROCEDURE — 3008F BODY MASS INDEX DOCD: CPT | Performed by: PHYSICIAN ASSISTANT

## 2020-10-28 PROCEDURE — 99214 OFFICE O/P EST MOD 30 MIN: CPT | Performed by: PHYSICIAN ASSISTANT

## 2020-10-28 PROCEDURE — 3077F SYST BP >= 140 MM HG: CPT | Performed by: PHYSICIAN ASSISTANT

## 2020-10-28 RX ORDER — ESOMEPRAZOLE MAGNESIUM 40 MG/1
40 CAPSULE, DELAYED RELEASE ORAL
Qty: 60 CAPSULE | Refills: 3 | Status: SHIPPED | OUTPATIENT
Start: 2020-10-28

## 2020-11-22 ENCOUNTER — APPOINTMENT (EMERGENCY)
Dept: CT IMAGING | Facility: HOSPITAL | Age: 55
DRG: 282 | End: 2020-11-22
Payer: COMMERCIAL

## 2020-11-22 ENCOUNTER — APPOINTMENT (EMERGENCY)
Dept: RADIOLOGY | Facility: HOSPITAL | Age: 55
DRG: 282 | End: 2020-11-22
Payer: COMMERCIAL

## 2020-11-22 ENCOUNTER — HOSPITAL ENCOUNTER (INPATIENT)
Facility: HOSPITAL | Age: 55
LOS: 6 days | Discharge: HOME | DRG: 282 | End: 2020-11-28
Attending: EMERGENCY MEDICINE | Admitting: INTERNAL MEDICINE
Payer: COMMERCIAL

## 2020-11-22 DIAGNOSIS — K86.1 ACUTE ON CHRONIC PANCREATITIS (HCC): ICD-10-CM

## 2020-11-22 DIAGNOSIS — R10.13 EPIGASTRIC PAIN: Primary | ICD-10-CM

## 2020-11-22 DIAGNOSIS — K86.3 PANCREATIC PSEUDOCYST: ICD-10-CM

## 2020-11-22 DIAGNOSIS — K85.20 ALCOHOL-INDUCED ACUTE PANCREATITIS, UNSPECIFIED COMPLICATION STATUS: ICD-10-CM

## 2020-11-22 DIAGNOSIS — K85.90 ACUTE ON CHRONIC PANCREATITIS (HCC): ICD-10-CM

## 2020-11-22 PROBLEM — K56.7 ILEUS (HCC): Status: ACTIVE | Noted: 2020-11-22

## 2020-11-22 PROBLEM — K56.600 PARTIAL SMALL BOWEL OBSTRUCTION (HCC): Status: ACTIVE | Noted: 2020-11-22

## 2020-11-22 PROBLEM — J44.9 COPD (CHRONIC OBSTRUCTIVE PULMONARY DISEASE) (HCC): Status: ACTIVE | Noted: 2020-11-22

## 2020-11-22 LAB
ALBUMIN SERPL BCP-MCNC: 3.8 G/DL (ref 3.5–5)
ALP SERPL-CCNC: 150 U/L (ref 46–116)
ALT SERPL W P-5'-P-CCNC: 51 U/L (ref 12–78)
ANION GAP SERPL CALCULATED.3IONS-SCNC: 9 MMOL/L (ref 4–13)
APTT PPP: 34 SECONDS (ref 23–37)
AST SERPL W P-5'-P-CCNC: 139 U/L (ref 5–45)
BASOPHILS # BLD AUTO: 0.05 THOUSANDS/ΜL (ref 0–0.1)
BASOPHILS NFR BLD AUTO: 1 % (ref 0–1)
BILIRUB SERPL-MCNC: 0.4 MG/DL (ref 0.2–1)
BILIRUB UR QL STRIP: NEGATIVE
BUN SERPL-MCNC: 3 MG/DL (ref 5–25)
CALCIUM SERPL-MCNC: 8.9 MG/DL (ref 8.3–10.1)
CHLORIDE SERPL-SCNC: 94 MMOL/L (ref 100–108)
CLARITY UR: CLEAR
CO2 SERPL-SCNC: 27 MMOL/L (ref 21–32)
COLOR UR: YELLOW
CREAT SERPL-MCNC: 0.45 MG/DL (ref 0.6–1.3)
EOSINOPHIL # BLD AUTO: 0.14 THOUSAND/ΜL (ref 0–0.61)
EOSINOPHIL NFR BLD AUTO: 3 % (ref 0–6)
ERYTHROCYTE [DISTWIDTH] IN BLOOD BY AUTOMATED COUNT: 22 % (ref 11.6–15.1)
FLUAV RNA RESP QL NAA+PROBE: NEGATIVE
FLUBV RNA RESP QL NAA+PROBE: NEGATIVE
GFR SERPL CREATININE-BSD FRML MDRD: 128 ML/MIN/1.73SQ M
GLUCOSE SERPL-MCNC: 108 MG/DL (ref 65–140)
GLUCOSE UR STRIP-MCNC: NEGATIVE MG/DL
HCT VFR BLD AUTO: 41.8 % (ref 36.5–49.3)
HGB BLD-MCNC: 13.7 G/DL (ref 12–17)
HGB UR QL STRIP.AUTO: NEGATIVE
IMM GRANULOCYTES # BLD AUTO: 0.01 THOUSAND/UL (ref 0–0.2)
IMM GRANULOCYTES NFR BLD AUTO: 0 % (ref 0–2)
INR PPP: 1.07 (ref 0.84–1.19)
KETONES UR STRIP-MCNC: NEGATIVE MG/DL
LACTATE SERPL-SCNC: 1.6 MMOL/L (ref 0.5–2)
LEUKOCYTE ESTERASE UR QL STRIP: NEGATIVE
LIPASE SERPL-CCNC: 9609 U/L (ref 73–393)
LYMPHOCYTES # BLD AUTO: 1.73 THOUSANDS/ΜL (ref 0.6–4.47)
LYMPHOCYTES NFR BLD AUTO: 37 % (ref 14–44)
MAGNESIUM SERPL-MCNC: 1.6 MG/DL (ref 1.6–2.6)
MCH RBC QN AUTO: 26.3 PG (ref 26.8–34.3)
MCHC RBC AUTO-ENTMCNC: 32.8 G/DL (ref 31.4–37.4)
MCV RBC AUTO: 80 FL (ref 82–98)
MONOCYTES # BLD AUTO: 0.52 THOUSAND/ΜL (ref 0.17–1.22)
MONOCYTES NFR BLD AUTO: 11 % (ref 4–12)
NEUTROPHILS # BLD AUTO: 2.21 THOUSANDS/ΜL (ref 1.85–7.62)
NEUTS SEG NFR BLD AUTO: 48 % (ref 43–75)
NITRITE UR QL STRIP: NEGATIVE
NRBC BLD AUTO-RTO: 0 /100 WBCS
NT-PROBNP SERPL-MCNC: 57 PG/ML
PH UR STRIP.AUTO: 6.5 [PH]
PLATELET # BLD AUTO: 130 THOUSANDS/UL (ref 149–390)
PMV BLD AUTO: 9.2 FL (ref 8.9–12.7)
POTASSIUM SERPL-SCNC: 3.7 MMOL/L (ref 3.5–5.3)
PROT SERPL-MCNC: 8.5 G/DL (ref 6.4–8.2)
PROT UR STRIP-MCNC: NEGATIVE MG/DL
PROTHROMBIN TIME: 13.7 SECONDS (ref 11.6–14.5)
RBC # BLD AUTO: 5.21 MILLION/UL (ref 3.88–5.62)
RSV RNA RESP QL NAA+PROBE: NEGATIVE
SARS-COV-2 RNA RESP QL NAA+PROBE: NEGATIVE
SODIUM SERPL-SCNC: 130 MMOL/L (ref 136–145)
SP GR UR STRIP.AUTO: 1.01 (ref 1–1.03)
TROPONIN I SERPL-MCNC: <0.02 NG/ML
UROBILINOGEN UR QL STRIP.AUTO: 0.2 E.U./DL
WBC # BLD AUTO: 4.66 THOUSAND/UL (ref 4.31–10.16)

## 2020-11-22 PROCEDURE — 83735 ASSAY OF MAGNESIUM: CPT | Performed by: EMERGENCY MEDICINE

## 2020-11-22 PROCEDURE — 74177 CT ABD & PELVIS W/CONTRAST: CPT

## 2020-11-22 PROCEDURE — 83605 ASSAY OF LACTIC ACID: CPT | Performed by: EMERGENCY MEDICINE

## 2020-11-22 PROCEDURE — G1004 CDSM NDSC: HCPCS

## 2020-11-22 PROCEDURE — 99285 EMERGENCY DEPT VISIT HI MDM: CPT

## 2020-11-22 PROCEDURE — 84484 ASSAY OF TROPONIN QUANT: CPT | Performed by: EMERGENCY MEDICINE

## 2020-11-22 PROCEDURE — 83690 ASSAY OF LIPASE: CPT | Performed by: EMERGENCY MEDICINE

## 2020-11-22 PROCEDURE — 99223 1ST HOSP IP/OBS HIGH 75: CPT | Performed by: PHYSICIAN ASSISTANT

## 2020-11-22 PROCEDURE — 85730 THROMBOPLASTIN TIME PARTIAL: CPT | Performed by: EMERGENCY MEDICINE

## 2020-11-22 PROCEDURE — 0241U HB NFCT DS VIR RESP RNA 4 TRGT: CPT | Performed by: PHYSICIAN ASSISTANT

## 2020-11-22 PROCEDURE — 96374 THER/PROPH/DIAG INJ IV PUSH: CPT

## 2020-11-22 PROCEDURE — 93005 ELECTROCARDIOGRAM TRACING: CPT

## 2020-11-22 PROCEDURE — 71045 X-RAY EXAM CHEST 1 VIEW: CPT

## 2020-11-22 PROCEDURE — 96361 HYDRATE IV INFUSION ADD-ON: CPT

## 2020-11-22 PROCEDURE — 96375 TX/PRO/DX INJ NEW DRUG ADDON: CPT

## 2020-11-22 PROCEDURE — 81003 URINALYSIS AUTO W/O SCOPE: CPT | Performed by: EMERGENCY MEDICINE

## 2020-11-22 PROCEDURE — 85610 PROTHROMBIN TIME: CPT | Performed by: EMERGENCY MEDICINE

## 2020-11-22 PROCEDURE — 99285 EMERGENCY DEPT VISIT HI MDM: CPT | Performed by: EMERGENCY MEDICINE

## 2020-11-22 PROCEDURE — 80053 COMPREHEN METABOLIC PANEL: CPT | Performed by: EMERGENCY MEDICINE

## 2020-11-22 PROCEDURE — 83880 ASSAY OF NATRIURETIC PEPTIDE: CPT | Performed by: EMERGENCY MEDICINE

## 2020-11-22 PROCEDURE — 85025 COMPLETE CBC W/AUTO DIFF WBC: CPT | Performed by: EMERGENCY MEDICINE

## 2020-11-22 PROCEDURE — 96376 TX/PRO/DX INJ SAME DRUG ADON: CPT

## 2020-11-22 RX ORDER — SODIUM CHLORIDE 9 MG/ML
3 INJECTION INTRAVENOUS
Status: DISCONTINUED | OUTPATIENT
Start: 2020-11-22 | End: 2020-11-28

## 2020-11-22 RX ORDER — DOCUSATE SODIUM 100 MG/1
100 CAPSULE, LIQUID FILLED ORAL 2 TIMES DAILY PRN
Status: DISCONTINUED | OUTPATIENT
Start: 2020-11-22 | End: 2020-11-25

## 2020-11-22 RX ORDER — FOLIC ACID 1 MG/1
1 TABLET ORAL DAILY
Status: DISCONTINUED | OUTPATIENT
Start: 2020-11-23 | End: 2020-11-28 | Stop reason: HOSPADM

## 2020-11-22 RX ORDER — ONDANSETRON 2 MG/ML
4 INJECTION INTRAMUSCULAR; INTRAVENOUS ONCE
Status: COMPLETED | OUTPATIENT
Start: 2020-11-22 | End: 2020-11-22

## 2020-11-22 RX ORDER — ALBUTEROL SULFATE 90 UG/1
2 AEROSOL, METERED RESPIRATORY (INHALATION) EVERY 4 HOURS PRN
Status: DISCONTINUED | OUTPATIENT
Start: 2020-11-22 | End: 2020-11-28 | Stop reason: HOSPADM

## 2020-11-22 RX ORDER — MORPHINE SULFATE 4 MG/ML
4 INJECTION, SOLUTION INTRAMUSCULAR; INTRAVENOUS ONCE
Status: COMPLETED | OUTPATIENT
Start: 2020-11-22 | End: 2020-11-22

## 2020-11-22 RX ORDER — MELATONIN
1000 DAILY
Status: DISCONTINUED | OUTPATIENT
Start: 2020-11-23 | End: 2020-11-28 | Stop reason: HOSPADM

## 2020-11-22 RX ORDER — HYDROMORPHONE HCL/PF 1 MG/ML
0.5 SYRINGE (ML) INJECTION ONCE
Status: COMPLETED | OUTPATIENT
Start: 2020-11-22 | End: 2020-11-22

## 2020-11-22 RX ORDER — THIAMINE MONONITRATE (VIT B1) 100 MG
100 TABLET ORAL DAILY
Status: DISCONTINUED | OUTPATIENT
Start: 2020-11-23 | End: 2020-11-28 | Stop reason: HOSPADM

## 2020-11-22 RX ORDER — PANTOPRAZOLE SODIUM 40 MG/1
40 TABLET, DELAYED RELEASE ORAL
Status: DISCONTINUED | OUTPATIENT
Start: 2020-11-23 | End: 2020-11-28 | Stop reason: HOSPADM

## 2020-11-22 RX ORDER — ONDANSETRON 2 MG/ML
4 INJECTION INTRAMUSCULAR; INTRAVENOUS EVERY 6 HOURS PRN
Status: DISCONTINUED | OUTPATIENT
Start: 2020-11-22 | End: 2020-11-28

## 2020-11-22 RX ORDER — SODIUM CHLORIDE 9 MG/ML
125 INJECTION, SOLUTION INTRAVENOUS CONTINUOUS
Status: DISCONTINUED | OUTPATIENT
Start: 2020-11-22 | End: 2020-11-25

## 2020-11-22 RX ORDER — GABAPENTIN 100 MG/1
100 CAPSULE ORAL 3 TIMES DAILY
Status: DISCONTINUED | OUTPATIENT
Start: 2020-11-22 | End: 2020-11-25

## 2020-11-22 RX ORDER — LEVETIRACETAM 500 MG/1
1000 TABLET ORAL EVERY 12 HOURS
Status: DISCONTINUED | OUTPATIENT
Start: 2020-11-22 | End: 2020-11-28 | Stop reason: HOSPADM

## 2020-11-22 RX ORDER — OXYCODONE HYDROCHLORIDE 5 MG/1
5 TABLET ORAL EVERY 4 HOURS PRN
Status: DISCONTINUED | OUTPATIENT
Start: 2020-11-22 | End: 2020-11-28 | Stop reason: HOSPADM

## 2020-11-22 RX ORDER — NICOTINE 21 MG/24HR
1 PATCH, TRANSDERMAL 24 HOURS TRANSDERMAL DAILY
Status: DISCONTINUED | OUTPATIENT
Start: 2020-11-23 | End: 2020-11-28 | Stop reason: HOSPADM

## 2020-11-22 RX ORDER — LISINOPRIL 10 MG/1
10 TABLET ORAL DAILY
Status: DISCONTINUED | OUTPATIENT
Start: 2020-11-23 | End: 2020-11-28 | Stop reason: HOSPADM

## 2020-11-22 RX ORDER — ALBUTEROL SULFATE 2.5 MG/3ML
2.5 SOLUTION RESPIRATORY (INHALATION) EVERY 6 HOURS PRN
Status: DISCONTINUED | OUTPATIENT
Start: 2020-11-22 | End: 2020-11-23

## 2020-11-22 RX ORDER — AMITRIPTYLINE HYDROCHLORIDE 25 MG/1
25 TABLET, FILM COATED ORAL
Status: DISCONTINUED | OUTPATIENT
Start: 2020-11-22 | End: 2020-11-28 | Stop reason: HOSPADM

## 2020-11-22 RX ADMIN — MORPHINE SULFATE 4 MG: 4 INJECTION, SOLUTION INTRAMUSCULAR; INTRAVENOUS at 17:56

## 2020-11-22 RX ADMIN — MORPHINE SULFATE 4 MG: 4 INJECTION, SOLUTION INTRAMUSCULAR; INTRAVENOUS at 16:51

## 2020-11-22 RX ADMIN — GABAPENTIN 100 MG: 100 CAPSULE ORAL at 22:13

## 2020-11-22 RX ADMIN — MORPHINE SULFATE 4 MG: 4 INJECTION, SOLUTION INTRAMUSCULAR; INTRAVENOUS at 13:50

## 2020-11-22 RX ADMIN — IOHEXOL 100 ML: 350 INJECTION, SOLUTION INTRAVENOUS at 17:09

## 2020-11-22 RX ADMIN — LEVETIRACETAM 1000 MG: 500 TABLET, FILM COATED ORAL at 22:12

## 2020-11-22 RX ADMIN — SODIUM CHLORIDE 125 ML/HR: 0.9 INJECTION, SOLUTION INTRAVENOUS at 22:13

## 2020-11-22 RX ADMIN — HYDROMORPHONE HYDROCHLORIDE 0.5 MG: 1 INJECTION, SOLUTION INTRAMUSCULAR; INTRAVENOUS; SUBCUTANEOUS at 22:13

## 2020-11-22 RX ADMIN — ONDANSETRON 4 MG: 2 INJECTION INTRAMUSCULAR; INTRAVENOUS at 13:50

## 2020-11-22 RX ADMIN — SODIUM CHLORIDE 1000 ML: 0.9 INJECTION, SOLUTION INTRAVENOUS at 13:55

## 2020-11-23 LAB
ALBUMIN SERPL BCP-MCNC: 3.4 G/DL (ref 3.5–5)
ALP SERPL-CCNC: 136 U/L (ref 46–116)
ALT SERPL W P-5'-P-CCNC: 38 U/L (ref 12–78)
ANION GAP SERPL CALCULATED.3IONS-SCNC: 8 MMOL/L (ref 4–13)
AST SERPL W P-5'-P-CCNC: 93 U/L (ref 5–45)
ATRIAL RATE: 90 BPM
BILIRUB SERPL-MCNC: 0.9 MG/DL (ref 0.2–1)
BUN SERPL-MCNC: 4 MG/DL (ref 5–25)
CALCIUM ALBUM COR SERPL-MCNC: 9 MG/DL (ref 8.3–10.1)
CALCIUM SERPL-MCNC: 8.5 MG/DL (ref 8.3–10.1)
CHLORIDE SERPL-SCNC: 96 MMOL/L (ref 100–108)
CO2 SERPL-SCNC: 26 MMOL/L (ref 21–32)
CREAT SERPL-MCNC: 0.45 MG/DL (ref 0.6–1.3)
ERYTHROCYTE [DISTWIDTH] IN BLOOD BY AUTOMATED COUNT: 21.7 % (ref 11.6–15.1)
GFR SERPL CREATININE-BSD FRML MDRD: 128 ML/MIN/1.73SQ M
GLUCOSE SERPL-MCNC: 88 MG/DL (ref 65–140)
HCT VFR BLD AUTO: 39.9 % (ref 36.5–49.3)
HGB BLD-MCNC: 12.7 G/DL (ref 12–17)
LIPASE SERPL-CCNC: 9662 U/L (ref 73–393)
MAGNESIUM SERPL-MCNC: 1.3 MG/DL (ref 1.6–2.6)
MCH RBC QN AUTO: 26.2 PG (ref 26.8–34.3)
MCHC RBC AUTO-ENTMCNC: 31.8 G/DL (ref 31.4–37.4)
MCV RBC AUTO: 82 FL (ref 82–98)
P AXIS: 62 DEGREES
PHOSPHATE SERPL-MCNC: 3.1 MG/DL (ref 2.7–4.5)
PLATELET # BLD AUTO: 93 THOUSANDS/UL (ref 149–390)
PMV BLD AUTO: 9.6 FL (ref 8.9–12.7)
POTASSIUM SERPL-SCNC: 4.4 MMOL/L (ref 3.5–5.3)
PR INTERVAL: 154 MS
PROT SERPL-MCNC: 7.6 G/DL (ref 6.4–8.2)
QRS AXIS: -59 DEGREES
QRSD INTERVAL: 100 MS
QT INTERVAL: 388 MS
QTC INTERVAL: 474 MS
RBC # BLD AUTO: 4.85 MILLION/UL (ref 3.88–5.62)
SODIUM SERPL-SCNC: 130 MMOL/L (ref 136–145)
T WAVE AXIS: 40 DEGREES
VENTRICULAR RATE: 90 BPM
WBC # BLD AUTO: 5.78 THOUSAND/UL (ref 4.31–10.16)

## 2020-11-23 PROCEDURE — 80177 DRUG SCRN QUAN LEVETIRACETAM: CPT | Performed by: PHYSICIAN ASSISTANT

## 2020-11-23 PROCEDURE — 97167 OT EVAL HIGH COMPLEX 60 MIN: CPT

## 2020-11-23 PROCEDURE — 93010 ELECTROCARDIOGRAM REPORT: CPT | Performed by: INTERNAL MEDICINE

## 2020-11-23 PROCEDURE — 99232 SBSQ HOSP IP/OBS MODERATE 35: CPT | Performed by: INTERNAL MEDICINE

## 2020-11-23 PROCEDURE — 85027 COMPLETE CBC AUTOMATED: CPT | Performed by: PHYSICIAN ASSISTANT

## 2020-11-23 PROCEDURE — 99254 IP/OBS CNSLTJ NEW/EST MOD 60: CPT | Performed by: SPECIALIST

## 2020-11-23 PROCEDURE — 83690 ASSAY OF LIPASE: CPT | Performed by: PHYSICIAN ASSISTANT

## 2020-11-23 PROCEDURE — 80053 COMPREHEN METABOLIC PANEL: CPT | Performed by: PHYSICIAN ASSISTANT

## 2020-11-23 PROCEDURE — 84100 ASSAY OF PHOSPHORUS: CPT | Performed by: PHYSICIAN ASSISTANT

## 2020-11-23 PROCEDURE — 97163 PT EVAL HIGH COMPLEX 45 MIN: CPT

## 2020-11-23 PROCEDURE — 83735 ASSAY OF MAGNESIUM: CPT | Performed by: PHYSICIAN ASSISTANT

## 2020-11-23 RX ORDER — MAGNESIUM SULFATE HEPTAHYDRATE 40 MG/ML
2 INJECTION, SOLUTION INTRAVENOUS ONCE
Status: COMPLETED | OUTPATIENT
Start: 2020-11-23 | End: 2020-11-23

## 2020-11-23 RX ORDER — HYDROMORPHONE HCL/PF 1 MG/ML
0.5 SYRINGE (ML) INJECTION EVERY 4 HOURS PRN
Status: DISCONTINUED | OUTPATIENT
Start: 2020-11-23 | End: 2020-11-28

## 2020-11-23 RX ADMIN — LISINOPRIL 10 MG: 10 TABLET ORAL at 09:49

## 2020-11-23 RX ADMIN — VITAM B12 100 MCG: 100 TAB at 09:48

## 2020-11-23 RX ADMIN — SODIUM CHLORIDE 125 ML/HR: 0.9 INJECTION, SOLUTION INTRAVENOUS at 19:14

## 2020-11-23 RX ADMIN — OXYCODONE HYDROCHLORIDE 5 MG: 5 TABLET ORAL at 01:38

## 2020-11-23 RX ADMIN — LEVETIRACETAM 1000 MG: 500 TABLET, FILM COATED ORAL at 09:50

## 2020-11-23 RX ADMIN — ENOXAPARIN SODIUM 40 MG: 40 INJECTION SUBCUTANEOUS at 09:48

## 2020-11-23 RX ADMIN — Medication 1000 UNITS: at 09:49

## 2020-11-23 RX ADMIN — FOLIC ACID 1 MG: 1 TABLET ORAL at 09:49

## 2020-11-23 RX ADMIN — OXYCODONE HYDROCHLORIDE 5 MG: 5 TABLET ORAL at 07:14

## 2020-11-23 RX ADMIN — PANTOPRAZOLE SODIUM 40 MG: 40 TABLET, DELAYED RELEASE ORAL at 05:19

## 2020-11-23 RX ADMIN — Medication 100 MG: at 09:49

## 2020-11-23 RX ADMIN — OXYCODONE HYDROCHLORIDE 5 MG: 5 TABLET ORAL at 17:35

## 2020-11-23 RX ADMIN — PANCRELIPASE 6000 UNITS: 30000; 6000; 19000 CAPSULE, DELAYED RELEASE PELLETS ORAL at 16:54

## 2020-11-23 RX ADMIN — GABAPENTIN 100 MG: 100 CAPSULE ORAL at 09:49

## 2020-11-23 RX ADMIN — MAGNESIUM SULFATE IN WATER 2 G: 40 INJECTION, SOLUTION INTRAVENOUS at 11:09

## 2020-11-23 RX ADMIN — GABAPENTIN 100 MG: 100 CAPSULE ORAL at 16:54

## 2020-11-23 RX ADMIN — LEVETIRACETAM 1000 MG: 500 TABLET, FILM COATED ORAL at 21:57

## 2020-11-23 RX ADMIN — GABAPENTIN 100 MG: 100 CAPSULE ORAL at 21:56

## 2020-11-23 RX ADMIN — SODIUM CHLORIDE 125 ML/HR: 0.9 INJECTION, SOLUTION INTRAVENOUS at 07:13

## 2020-11-23 RX ADMIN — PANCRELIPASE 6000 UNITS: 30000; 6000; 19000 CAPSULE, DELAYED RELEASE PELLETS ORAL at 09:48

## 2020-11-23 RX ADMIN — PANCRELIPASE 6000 UNITS: 30000; 6000; 19000 CAPSULE, DELAYED RELEASE PELLETS ORAL at 13:04

## 2020-11-24 LAB
ALBUMIN SERPL BCP-MCNC: 3.2 G/DL (ref 3.5–5)
ALP SERPL-CCNC: 127 U/L (ref 46–116)
ALT SERPL W P-5'-P-CCNC: 33 U/L (ref 12–78)
ANION GAP SERPL CALCULATED.3IONS-SCNC: 4 MMOL/L (ref 4–13)
AST SERPL W P-5'-P-CCNC: 67 U/L (ref 5–45)
BASOPHILS # BLD AUTO: 0.02 THOUSANDS/ΜL (ref 0–0.1)
BASOPHILS NFR BLD AUTO: 1 % (ref 0–1)
BILIRUB SERPL-MCNC: 0.8 MG/DL (ref 0.2–1)
BUN SERPL-MCNC: 3 MG/DL (ref 5–25)
CALCIUM ALBUM COR SERPL-MCNC: 8.8 MG/DL (ref 8.3–10.1)
CALCIUM SERPL-MCNC: 8.2 MG/DL (ref 8.3–10.1)
CHLORIDE SERPL-SCNC: 98 MMOL/L (ref 100–108)
CO2 SERPL-SCNC: 30 MMOL/L (ref 21–32)
CREAT SERPL-MCNC: 0.52 MG/DL (ref 0.6–1.3)
EOSINOPHIL # BLD AUTO: 0.13 THOUSAND/ΜL (ref 0–0.61)
EOSINOPHIL NFR BLD AUTO: 3 % (ref 0–6)
ERYTHROCYTE [DISTWIDTH] IN BLOOD BY AUTOMATED COUNT: 21.3 % (ref 11.6–15.1)
GFR SERPL CREATININE-BSD FRML MDRD: 121 ML/MIN/1.73SQ M
GLUCOSE SERPL-MCNC: 98 MG/DL (ref 65–140)
HCT VFR BLD AUTO: 37.1 % (ref 36.5–49.3)
HGB BLD-MCNC: 11.8 G/DL (ref 12–17)
IMM GRANULOCYTES # BLD AUTO: 0.01 THOUSAND/UL (ref 0–0.2)
IMM GRANULOCYTES NFR BLD AUTO: 0 % (ref 0–2)
LIPASE SERPL-CCNC: 2837 U/L (ref 73–393)
LYMPHOCYTES # BLD AUTO: 1.25 THOUSANDS/ΜL (ref 0.6–4.47)
LYMPHOCYTES NFR BLD AUTO: 32 % (ref 14–44)
MAGNESIUM SERPL-MCNC: 1.6 MG/DL (ref 1.6–2.6)
MCH RBC QN AUTO: 26.3 PG (ref 26.8–34.3)
MCHC RBC AUTO-ENTMCNC: 31.8 G/DL (ref 31.4–37.4)
MCV RBC AUTO: 83 FL (ref 82–98)
MONOCYTES # BLD AUTO: 0.61 THOUSAND/ΜL (ref 0.17–1.22)
MONOCYTES NFR BLD AUTO: 16 % (ref 4–12)
NEUTROPHILS # BLD AUTO: 1.86 THOUSANDS/ΜL (ref 1.85–7.62)
NEUTS SEG NFR BLD AUTO: 48 % (ref 43–75)
NRBC BLD AUTO-RTO: 0 /100 WBCS
PHOSPHATE SERPL-MCNC: 1.9 MG/DL (ref 2.7–4.5)
PLATELET # BLD AUTO: 83 THOUSANDS/UL (ref 149–390)
PMV BLD AUTO: 10.3 FL (ref 8.9–12.7)
POTASSIUM SERPL-SCNC: 3.3 MMOL/L (ref 3.5–5.3)
PROT SERPL-MCNC: 6.8 G/DL (ref 6.4–8.2)
RBC # BLD AUTO: 4.48 MILLION/UL (ref 3.88–5.62)
SODIUM SERPL-SCNC: 132 MMOL/L (ref 136–145)
WBC # BLD AUTO: 3.88 THOUSAND/UL (ref 4.31–10.16)

## 2020-11-24 PROCEDURE — 83690 ASSAY OF LIPASE: CPT | Performed by: SPECIALIST

## 2020-11-24 PROCEDURE — 99232 SBSQ HOSP IP/OBS MODERATE 35: CPT

## 2020-11-24 PROCEDURE — 85025 COMPLETE CBC W/AUTO DIFF WBC: CPT | Performed by: STUDENT IN AN ORGANIZED HEALTH CARE EDUCATION/TRAINING PROGRAM

## 2020-11-24 PROCEDURE — NC001 PR NO CHARGE

## 2020-11-24 PROCEDURE — 83735 ASSAY OF MAGNESIUM: CPT | Performed by: STUDENT IN AN ORGANIZED HEALTH CARE EDUCATION/TRAINING PROGRAM

## 2020-11-24 PROCEDURE — 99232 SBSQ HOSP IP/OBS MODERATE 35: CPT | Performed by: INTERNAL MEDICINE

## 2020-11-24 PROCEDURE — 84100 ASSAY OF PHOSPHORUS: CPT | Performed by: STUDENT IN AN ORGANIZED HEALTH CARE EDUCATION/TRAINING PROGRAM

## 2020-11-24 PROCEDURE — 80053 COMPREHEN METABOLIC PANEL: CPT | Performed by: STUDENT IN AN ORGANIZED HEALTH CARE EDUCATION/TRAINING PROGRAM

## 2020-11-24 PROCEDURE — 97535 SELF CARE MNGMENT TRAINING: CPT

## 2020-11-24 RX ORDER — MAGNESIUM SULFATE HEPTAHYDRATE 40 MG/ML
2 INJECTION, SOLUTION INTRAVENOUS ONCE
Status: COMPLETED | OUTPATIENT
Start: 2020-11-24 | End: 2020-11-24

## 2020-11-24 RX ORDER — POTASSIUM CHLORIDE 14.9 MG/ML
20 INJECTION INTRAVENOUS ONCE
Status: COMPLETED | OUTPATIENT
Start: 2020-11-24 | End: 2020-11-24

## 2020-11-24 RX ADMIN — Medication 100 MG: at 09:27

## 2020-11-24 RX ADMIN — GABAPENTIN 100 MG: 100 CAPSULE ORAL at 09:28

## 2020-11-24 RX ADMIN — VITAM B12 100 MCG: 100 TAB at 09:27

## 2020-11-24 RX ADMIN — PANTOPRAZOLE SODIUM 40 MG: 40 TABLET, DELAYED RELEASE ORAL at 05:54

## 2020-11-24 RX ADMIN — PANCRELIPASE 6000 UNITS: 30000; 6000; 19000 CAPSULE, DELAYED RELEASE PELLETS ORAL at 16:10

## 2020-11-24 RX ADMIN — MAGNESIUM SULFATE IN WATER 2 G: 40 INJECTION, SOLUTION INTRAVENOUS at 10:04

## 2020-11-24 RX ADMIN — POTASSIUM PHOSPHATE, MONOBASIC AND POTASSIUM PHOSPHATE, DIBASIC 21 MMOL: 224; 236 INJECTION, SOLUTION, CONCENTRATE INTRAVENOUS at 11:47

## 2020-11-24 RX ADMIN — GABAPENTIN 100 MG: 100 CAPSULE ORAL at 16:10

## 2020-11-24 RX ADMIN — HYDROMORPHONE HYDROCHLORIDE 0.5 MG: 1 INJECTION, SOLUTION INTRAMUSCULAR; INTRAVENOUS; SUBCUTANEOUS at 21:45

## 2020-11-24 RX ADMIN — FOLIC ACID 1 MG: 1 TABLET ORAL at 09:27

## 2020-11-24 RX ADMIN — HYDROMORPHONE HYDROCHLORIDE 0.5 MG: 1 INJECTION, SOLUTION INTRAMUSCULAR; INTRAVENOUS; SUBCUTANEOUS at 14:11

## 2020-11-24 RX ADMIN — SODIUM CHLORIDE 125 ML/HR: 0.9 INJECTION, SOLUTION INTRAVENOUS at 21:45

## 2020-11-24 RX ADMIN — PANCRELIPASE 6000 UNITS: 30000; 6000; 19000 CAPSULE, DELAYED RELEASE PELLETS ORAL at 12:03

## 2020-11-24 RX ADMIN — POTASSIUM CHLORIDE 20 MEQ: 14.9 INJECTION, SOLUTION INTRAVENOUS at 16:10

## 2020-11-24 RX ADMIN — PANCRELIPASE 6000 UNITS: 30000; 6000; 19000 CAPSULE, DELAYED RELEASE PELLETS ORAL at 09:30

## 2020-11-24 RX ADMIN — GABAPENTIN 100 MG: 100 CAPSULE ORAL at 21:45

## 2020-11-24 RX ADMIN — LEVETIRACETAM 1000 MG: 500 TABLET, FILM COATED ORAL at 09:27

## 2020-11-24 RX ADMIN — Medication 1000 UNITS: at 09:27

## 2020-11-24 RX ADMIN — LEVETIRACETAM 1000 MG: 500 TABLET, FILM COATED ORAL at 21:45

## 2020-11-24 RX ADMIN — SODIUM CHLORIDE 125 ML/HR: 0.9 INJECTION, SOLUTION INTRAVENOUS at 02:57

## 2020-11-24 RX ADMIN — OXYCODONE HYDROCHLORIDE 5 MG: 5 TABLET ORAL at 02:57

## 2020-11-24 RX ADMIN — LISINOPRIL 10 MG: 10 TABLET ORAL at 09:27

## 2020-11-24 RX ADMIN — ENOXAPARIN SODIUM 40 MG: 40 INJECTION SUBCUTANEOUS at 09:27

## 2020-11-25 PROBLEM — F10.20 CONTINUOUS CHRONIC ALCOHOLISM (HCC): Status: ACTIVE | Noted: 2017-10-05

## 2020-11-25 LAB
ALBUMIN SERPL BCP-MCNC: 3.2 G/DL (ref 3.5–5)
ALP SERPL-CCNC: 132 U/L (ref 46–116)
ALT SERPL W P-5'-P-CCNC: 34 U/L (ref 12–78)
ANION GAP SERPL CALCULATED.3IONS-SCNC: 5 MMOL/L (ref 4–13)
AST SERPL W P-5'-P-CCNC: 71 U/L (ref 5–45)
BASOPHILS # BLD AUTO: 0.03 THOUSANDS/ΜL (ref 0–0.1)
BASOPHILS NFR BLD AUTO: 1 % (ref 0–1)
BILIRUB SERPL-MCNC: 1 MG/DL (ref 0.2–1)
BUN SERPL-MCNC: 2 MG/DL (ref 5–25)
CALCIUM ALBUM COR SERPL-MCNC: 9.1 MG/DL (ref 8.3–10.1)
CALCIUM SERPL-MCNC: 8.5 MG/DL (ref 8.3–10.1)
CHLORIDE SERPL-SCNC: 98 MMOL/L (ref 100–108)
CO2 SERPL-SCNC: 29 MMOL/L (ref 21–32)
CREAT SERPL-MCNC: 0.48 MG/DL (ref 0.6–1.3)
EOSINOPHIL # BLD AUTO: 0.09 THOUSAND/ΜL (ref 0–0.61)
EOSINOPHIL NFR BLD AUTO: 2 % (ref 0–6)
ERYTHROCYTE [DISTWIDTH] IN BLOOD BY AUTOMATED COUNT: 21.4 % (ref 11.6–15.1)
GFR SERPL CREATININE-BSD FRML MDRD: 125 ML/MIN/1.73SQ M
GLUCOSE SERPL-MCNC: 108 MG/DL (ref 65–140)
HCT VFR BLD AUTO: 36.4 % (ref 36.5–49.3)
HGB BLD-MCNC: 11.4 G/DL (ref 12–17)
IMM GRANULOCYTES # BLD AUTO: 0.01 THOUSAND/UL (ref 0–0.2)
IMM GRANULOCYTES NFR BLD AUTO: 0 % (ref 0–2)
LEVETIRACETAM SERPL-MCNC: 18.9 UG/ML (ref 10–40)
LIPASE SERPL-CCNC: 3889 U/L (ref 73–393)
LYMPHOCYTES # BLD AUTO: 1.07 THOUSANDS/ΜL (ref 0.6–4.47)
LYMPHOCYTES NFR BLD AUTO: 27 % (ref 14–44)
MAGNESIUM SERPL-MCNC: 1.5 MG/DL (ref 1.6–2.6)
MCH RBC QN AUTO: 26.4 PG (ref 26.8–34.3)
MCHC RBC AUTO-ENTMCNC: 31.3 G/DL (ref 31.4–37.4)
MCV RBC AUTO: 84 FL (ref 82–98)
MONOCYTES # BLD AUTO: 0.66 THOUSAND/ΜL (ref 0.17–1.22)
MONOCYTES NFR BLD AUTO: 17 % (ref 4–12)
NEUTROPHILS # BLD AUTO: 2.12 THOUSANDS/ΜL (ref 1.85–7.62)
NEUTS SEG NFR BLD AUTO: 53 % (ref 43–75)
NRBC BLD AUTO-RTO: 0 /100 WBCS
PHOSPHATE SERPL-MCNC: 2.2 MG/DL (ref 2.7–4.5)
PLATELET # BLD AUTO: 81 THOUSANDS/UL (ref 149–390)
PMV BLD AUTO: 10.3 FL (ref 8.9–12.7)
POTASSIUM SERPL-SCNC: 3.4 MMOL/L (ref 3.5–5.3)
PROT SERPL-MCNC: 7.1 G/DL (ref 6.4–8.2)
RBC # BLD AUTO: 4.32 MILLION/UL (ref 3.88–5.62)
SODIUM SERPL-SCNC: 132 MMOL/L (ref 136–145)
WBC # BLD AUTO: 3.98 THOUSAND/UL (ref 4.31–10.16)

## 2020-11-25 PROCEDURE — 99232 SBSQ HOSP IP/OBS MODERATE 35: CPT | Performed by: SPECIALIST

## 2020-11-25 PROCEDURE — 84100 ASSAY OF PHOSPHORUS: CPT | Performed by: STUDENT IN AN ORGANIZED HEALTH CARE EDUCATION/TRAINING PROGRAM

## 2020-11-25 PROCEDURE — 85025 COMPLETE CBC W/AUTO DIFF WBC: CPT | Performed by: STUDENT IN AN ORGANIZED HEALTH CARE EDUCATION/TRAINING PROGRAM

## 2020-11-25 PROCEDURE — 83690 ASSAY OF LIPASE: CPT

## 2020-11-25 PROCEDURE — 83735 ASSAY OF MAGNESIUM: CPT | Performed by: STUDENT IN AN ORGANIZED HEALTH CARE EDUCATION/TRAINING PROGRAM

## 2020-11-25 PROCEDURE — 99232 SBSQ HOSP IP/OBS MODERATE 35: CPT | Performed by: INTERNAL MEDICINE

## 2020-11-25 PROCEDURE — 97530 THERAPEUTIC ACTIVITIES: CPT

## 2020-11-25 PROCEDURE — 97116 GAIT TRAINING THERAPY: CPT

## 2020-11-25 PROCEDURE — 80053 COMPREHEN METABOLIC PANEL: CPT | Performed by: STUDENT IN AN ORGANIZED HEALTH CARE EDUCATION/TRAINING PROGRAM

## 2020-11-25 RX ORDER — MAGNESIUM SULFATE HEPTAHYDRATE 40 MG/ML
2 INJECTION, SOLUTION INTRAVENOUS
Status: COMPLETED | OUTPATIENT
Start: 2020-11-25 | End: 2020-11-25

## 2020-11-25 RX ORDER — POLYETHYLENE GLYCOL 3350 17 G/17G
17 POWDER, FOR SOLUTION ORAL DAILY PRN
Status: DISCONTINUED | OUTPATIENT
Start: 2020-11-25 | End: 2020-11-28 | Stop reason: HOSPADM

## 2020-11-25 RX ORDER — POLYETHYLENE GLYCOL 3350 17 G/17G
17 POWDER, FOR SOLUTION ORAL DAILY PRN
Status: DISCONTINUED | OUTPATIENT
Start: 2020-11-25 | End: 2020-11-26

## 2020-11-25 RX ORDER — POLYETHYLENE GLYCOL 3350 17 G/17G
17 POWDER, FOR SOLUTION ORAL ONCE
Status: COMPLETED | OUTPATIENT
Start: 2020-11-25 | End: 2020-11-25

## 2020-11-25 RX ORDER — GABAPENTIN 300 MG/1
300 CAPSULE ORAL 3 TIMES DAILY
Status: DISCONTINUED | OUTPATIENT
Start: 2020-11-25 | End: 2020-11-28 | Stop reason: HOSPADM

## 2020-11-25 RX ORDER — DOCUSATE SODIUM 100 MG/1
100 CAPSULE, LIQUID FILLED ORAL 2 TIMES DAILY
Status: DISCONTINUED | OUTPATIENT
Start: 2020-11-25 | End: 2020-11-28 | Stop reason: HOSPADM

## 2020-11-25 RX ADMIN — DOCUSATE SODIUM 100 MG: 100 CAPSULE ORAL at 19:00

## 2020-11-25 RX ADMIN — PANCRELIPASE 6000 UNITS: 30000; 6000; 19000 CAPSULE, DELAYED RELEASE PELLETS ORAL at 08:58

## 2020-11-25 RX ADMIN — PANTOPRAZOLE SODIUM 40 MG: 40 TABLET, DELAYED RELEASE ORAL at 05:43

## 2020-11-25 RX ADMIN — MAGNESIUM SULFATE IN WATER 2 G: 40 INJECTION, SOLUTION INTRAVENOUS at 19:00

## 2020-11-25 RX ADMIN — POLYETHYLENE GLYCOL 3350 17 G: 17 POWDER, FOR SOLUTION ORAL at 14:06

## 2020-11-25 RX ADMIN — GABAPENTIN 300 MG: 300 CAPSULE ORAL at 21:16

## 2020-11-25 RX ADMIN — VITAM B12 100 MCG: 100 TAB at 08:58

## 2020-11-25 RX ADMIN — GABAPENTIN 300 MG: 300 CAPSULE ORAL at 16:55

## 2020-11-25 RX ADMIN — HYDROMORPHONE HYDROCHLORIDE 0.5 MG: 1 INJECTION, SOLUTION INTRAMUSCULAR; INTRAVENOUS; SUBCUTANEOUS at 05:43

## 2020-11-25 RX ADMIN — LEVETIRACETAM 1000 MG: 500 TABLET, FILM COATED ORAL at 08:58

## 2020-11-25 RX ADMIN — GABAPENTIN 100 MG: 100 CAPSULE ORAL at 08:58

## 2020-11-25 RX ADMIN — LEVETIRACETAM 1000 MG: 500 TABLET, FILM COATED ORAL at 21:16

## 2020-11-25 RX ADMIN — SODIUM CHLORIDE 125 ML/HR: 0.9 INJECTION, SOLUTION INTRAVENOUS at 05:43

## 2020-11-25 RX ADMIN — HYDROMORPHONE HYDROCHLORIDE 0.5 MG: 1 INJECTION, SOLUTION INTRAMUSCULAR; INTRAVENOUS; SUBCUTANEOUS at 17:02

## 2020-11-25 RX ADMIN — POTASSIUM PHOSPHATE, MONOBASIC AND POTASSIUM PHOSPHATE, DIBASIC 21 MMOL: 224; 236 INJECTION, SOLUTION, CONCENTRATE INTRAVENOUS at 21:16

## 2020-11-25 RX ADMIN — Medication 1000 UNITS: at 08:58

## 2020-11-25 RX ADMIN — Medication 100 MG: at 08:58

## 2020-11-25 RX ADMIN — MAGNESIUM SULFATE IN WATER 2 G: 40 INJECTION, SOLUTION INTRAVENOUS at 16:55

## 2020-11-25 RX ADMIN — HYDROMORPHONE HYDROCHLORIDE 0.5 MG: 1 INJECTION, SOLUTION INTRAMUSCULAR; INTRAVENOUS; SUBCUTANEOUS at 10:46

## 2020-11-25 RX ADMIN — HYDROMORPHONE HYDROCHLORIDE 0.5 MG: 1 INJECTION, SOLUTION INTRAMUSCULAR; INTRAVENOUS; SUBCUTANEOUS at 21:16

## 2020-11-25 RX ADMIN — LISINOPRIL 10 MG: 10 TABLET ORAL at 08:58

## 2020-11-25 RX ADMIN — FOLIC ACID 1 MG: 1 TABLET ORAL at 08:58

## 2020-11-25 RX ADMIN — ENOXAPARIN SODIUM 40 MG: 40 INJECTION SUBCUTANEOUS at 08:59

## 2020-11-25 RX ADMIN — PANCRELIPASE 6000 UNITS: 30000; 6000; 19000 CAPSULE, DELAYED RELEASE PELLETS ORAL at 16:55

## 2020-11-26 LAB
ALBUMIN SERPL BCP-MCNC: 2.8 G/DL (ref 3.5–5)
ALP SERPL-CCNC: 126 U/L (ref 46–116)
ALT SERPL W P-5'-P-CCNC: 31 U/L (ref 12–78)
ANION GAP SERPL CALCULATED.3IONS-SCNC: 9 MMOL/L (ref 4–13)
AST SERPL W P-5'-P-CCNC: 51 U/L (ref 5–45)
BASOPHILS # BLD AUTO: 0.03 THOUSANDS/ΜL (ref 0–0.1)
BASOPHILS NFR BLD AUTO: 1 % (ref 0–1)
BILIRUB SERPL-MCNC: 0.8 MG/DL (ref 0.2–1)
BUN SERPL-MCNC: 2 MG/DL (ref 5–25)
CALCIUM ALBUM COR SERPL-MCNC: 9.3 MG/DL (ref 8.3–10.1)
CALCIUM SERPL-MCNC: 8.3 MG/DL (ref 8.3–10.1)
CHLORIDE SERPL-SCNC: 94 MMOL/L (ref 100–108)
CO2 SERPL-SCNC: 28 MMOL/L (ref 21–32)
CREAT SERPL-MCNC: 0.42 MG/DL (ref 0.6–1.3)
EOSINOPHIL # BLD AUTO: 0.09 THOUSAND/ΜL (ref 0–0.61)
EOSINOPHIL NFR BLD AUTO: 2 % (ref 0–6)
ERYTHROCYTE [DISTWIDTH] IN BLOOD BY AUTOMATED COUNT: 21.2 % (ref 11.6–15.1)
GFR SERPL CREATININE-BSD FRML MDRD: 132 ML/MIN/1.73SQ M
GLUCOSE SERPL-MCNC: 129 MG/DL (ref 65–140)
HCT VFR BLD AUTO: 32.4 % (ref 36.5–49.3)
HGB BLD-MCNC: 10.4 G/DL (ref 12–17)
IMM GRANULOCYTES # BLD AUTO: 0.02 THOUSAND/UL (ref 0–0.2)
IMM GRANULOCYTES NFR BLD AUTO: 1 % (ref 0–2)
LYMPHOCYTES # BLD AUTO: 1.21 THOUSANDS/ΜL (ref 0.6–4.47)
LYMPHOCYTES NFR BLD AUTO: 27 % (ref 14–44)
MAGNESIUM SERPL-MCNC: 1.9 MG/DL (ref 1.6–2.6)
MCH RBC QN AUTO: 26.6 PG (ref 26.8–34.3)
MCHC RBC AUTO-ENTMCNC: 32.1 G/DL (ref 31.4–37.4)
MCV RBC AUTO: 83 FL (ref 82–98)
MONOCYTES # BLD AUTO: 0.97 THOUSAND/ΜL (ref 0.17–1.22)
MONOCYTES NFR BLD AUTO: 22 % (ref 4–12)
NEUTROPHILS # BLD AUTO: 2.09 THOUSANDS/ΜL (ref 1.85–7.62)
NEUTS SEG NFR BLD AUTO: 47 % (ref 43–75)
NRBC BLD AUTO-RTO: 0 /100 WBCS
PHOSPHATE SERPL-MCNC: 2.7 MG/DL (ref 2.7–4.5)
PLATELET # BLD AUTO: 85 THOUSANDS/UL (ref 149–390)
PMV BLD AUTO: 10.1 FL (ref 8.9–12.7)
POTASSIUM SERPL-SCNC: 3.3 MMOL/L (ref 3.5–5.3)
PROT SERPL-MCNC: 6.6 G/DL (ref 6.4–8.2)
RBC # BLD AUTO: 3.91 MILLION/UL (ref 3.88–5.62)
SODIUM SERPL-SCNC: 131 MMOL/L (ref 136–145)
WBC # BLD AUTO: 4.41 THOUSAND/UL (ref 4.31–10.16)

## 2020-11-26 PROCEDURE — 99232 SBSQ HOSP IP/OBS MODERATE 35: CPT | Performed by: PHYSICIAN ASSISTANT

## 2020-11-26 PROCEDURE — 83735 ASSAY OF MAGNESIUM: CPT | Performed by: INTERNAL MEDICINE

## 2020-11-26 PROCEDURE — 99231 SBSQ HOSP IP/OBS SF/LOW 25: CPT | Performed by: INTERNAL MEDICINE

## 2020-11-26 PROCEDURE — 84100 ASSAY OF PHOSPHORUS: CPT | Performed by: INTERNAL MEDICINE

## 2020-11-26 PROCEDURE — 85025 COMPLETE CBC W/AUTO DIFF WBC: CPT | Performed by: INTERNAL MEDICINE

## 2020-11-26 PROCEDURE — 80053 COMPREHEN METABOLIC PANEL: CPT | Performed by: INTERNAL MEDICINE

## 2020-11-26 RX ORDER — SIMETHICONE 80 MG
80 TABLET,CHEWABLE ORAL
Status: DISCONTINUED | OUTPATIENT
Start: 2020-11-26 | End: 2020-11-28 | Stop reason: HOSPADM

## 2020-11-26 RX ORDER — POLYETHYLENE GLYCOL 3350 17 G/17G
17 POWDER, FOR SOLUTION ORAL ONCE
Status: DISCONTINUED | OUTPATIENT
Start: 2020-11-26 | End: 2020-11-28

## 2020-11-26 RX ADMIN — GABAPENTIN 300 MG: 300 CAPSULE ORAL at 16:59

## 2020-11-26 RX ADMIN — PANCRELIPASE 6000 UNITS: 30000; 6000; 19000 CAPSULE, DELAYED RELEASE PELLETS ORAL at 17:05

## 2020-11-26 RX ADMIN — LISINOPRIL 10 MG: 10 TABLET ORAL at 09:07

## 2020-11-26 RX ADMIN — FOLIC ACID 1 MG: 1 TABLET ORAL at 09:07

## 2020-11-26 RX ADMIN — HYDROMORPHONE HYDROCHLORIDE 0.5 MG: 1 INJECTION, SOLUTION INTRAMUSCULAR; INTRAVENOUS; SUBCUTANEOUS at 10:46

## 2020-11-26 RX ADMIN — PANCRELIPASE 6000 UNITS: 30000; 6000; 19000 CAPSULE, DELAYED RELEASE PELLETS ORAL at 12:15

## 2020-11-26 RX ADMIN — Medication 100 MG: at 09:07

## 2020-11-26 RX ADMIN — DOCUSATE SODIUM 100 MG: 100 CAPSULE ORAL at 09:07

## 2020-11-26 RX ADMIN — SIMETHICONE CHEW TAB 80 MG 80 MG: 80 TABLET ORAL at 17:05

## 2020-11-26 RX ADMIN — VITAM B12 100 MCG: 100 TAB at 09:07

## 2020-11-26 RX ADMIN — GABAPENTIN 300 MG: 300 CAPSULE ORAL at 20:22

## 2020-11-26 RX ADMIN — GABAPENTIN 300 MG: 300 CAPSULE ORAL at 09:07

## 2020-11-26 RX ADMIN — Medication 1000 UNITS: at 09:07

## 2020-11-26 RX ADMIN — PANCRELIPASE 6000 UNITS: 30000; 6000; 19000 CAPSULE, DELAYED RELEASE PELLETS ORAL at 07:30

## 2020-11-26 RX ADMIN — SIMETHICONE CHEW TAB 80 MG 80 MG: 80 TABLET ORAL at 12:15

## 2020-11-26 RX ADMIN — PANTOPRAZOLE SODIUM 40 MG: 40 TABLET, DELAYED RELEASE ORAL at 05:23

## 2020-11-26 RX ADMIN — ENOXAPARIN SODIUM 40 MG: 40 INJECTION SUBCUTANEOUS at 09:08

## 2020-11-26 RX ADMIN — HYDROMORPHONE HYDROCHLORIDE 0.5 MG: 1 INJECTION, SOLUTION INTRAMUSCULAR; INTRAVENOUS; SUBCUTANEOUS at 20:29

## 2020-11-26 RX ADMIN — HYDROMORPHONE HYDROCHLORIDE 0.5 MG: 1 INJECTION, SOLUTION INTRAMUSCULAR; INTRAVENOUS; SUBCUTANEOUS at 01:40

## 2020-11-26 RX ADMIN — HYDROMORPHONE HYDROCHLORIDE 0.5 MG: 1 INJECTION, SOLUTION INTRAMUSCULAR; INTRAVENOUS; SUBCUTANEOUS at 06:12

## 2020-11-26 RX ADMIN — LEVETIRACETAM 1000 MG: 500 TABLET, FILM COATED ORAL at 20:22

## 2020-11-26 RX ADMIN — LEVETIRACETAM 1000 MG: 500 TABLET, FILM COATED ORAL at 09:07

## 2020-11-26 RX ADMIN — DOCUSATE SODIUM 100 MG: 100 CAPSULE ORAL at 17:05

## 2020-11-27 PROBLEM — E83.39 HYPOPHOSPHATEMIA: Status: RESOLVED | Noted: 2017-03-09 | Resolved: 2020-11-27

## 2020-11-27 PROBLEM — E83.42 HYPOMAGNESEMIA: Status: RESOLVED | Noted: 2017-01-12 | Resolved: 2020-11-27

## 2020-11-27 LAB
ANION GAP SERPL CALCULATED.3IONS-SCNC: 7 MMOL/L (ref 4–13)
BASOPHILS # BLD AUTO: 0.03 THOUSANDS/ΜL (ref 0–0.1)
BASOPHILS NFR BLD AUTO: 1 % (ref 0–1)
BUN SERPL-MCNC: 2 MG/DL (ref 5–25)
CALCIUM SERPL-MCNC: 8.8 MG/DL (ref 8.3–10.1)
CHLORIDE SERPL-SCNC: 92 MMOL/L (ref 100–108)
CO2 SERPL-SCNC: 30 MMOL/L (ref 21–32)
CREAT SERPL-MCNC: 0.49 MG/DL (ref 0.6–1.3)
EOSINOPHIL # BLD AUTO: 0.16 THOUSAND/ΜL (ref 0–0.61)
EOSINOPHIL NFR BLD AUTO: 3 % (ref 0–6)
ERYTHROCYTE [DISTWIDTH] IN BLOOD BY AUTOMATED COUNT: 21.8 % (ref 11.6–15.1)
GFR SERPL CREATININE-BSD FRML MDRD: 124 ML/MIN/1.73SQ M
GLUCOSE SERPL-MCNC: 84 MG/DL (ref 65–140)
HCT VFR BLD AUTO: 36.9 % (ref 36.5–49.3)
HGB BLD-MCNC: 11.6 G/DL (ref 12–17)
IMM GRANULOCYTES # BLD AUTO: 0.01 THOUSAND/UL (ref 0–0.2)
IMM GRANULOCYTES NFR BLD AUTO: 0 % (ref 0–2)
LYMPHOCYTES # BLD AUTO: 1.44 THOUSANDS/ΜL (ref 0.6–4.47)
LYMPHOCYTES NFR BLD AUTO: 30 % (ref 14–44)
MCH RBC QN AUTO: 26.5 PG (ref 26.8–34.3)
MCHC RBC AUTO-ENTMCNC: 31.4 G/DL (ref 31.4–37.4)
MCV RBC AUTO: 84 FL (ref 82–98)
MONOCYTES # BLD AUTO: 1.12 THOUSAND/ΜL (ref 0.17–1.22)
MONOCYTES NFR BLD AUTO: 23 % (ref 4–12)
NEUTROPHILS # BLD AUTO: 2.06 THOUSANDS/ΜL (ref 1.85–7.62)
NEUTS SEG NFR BLD AUTO: 43 % (ref 43–75)
NRBC BLD AUTO-RTO: 0 /100 WBCS
PLATELET # BLD AUTO: 114 THOUSANDS/UL (ref 149–390)
PMV BLD AUTO: 9.5 FL (ref 8.9–12.7)
POTASSIUM SERPL-SCNC: 3 MMOL/L (ref 3.5–5.3)
RBC # BLD AUTO: 4.37 MILLION/UL (ref 3.88–5.62)
SODIUM SERPL-SCNC: 129 MMOL/L (ref 136–145)
WBC # BLD AUTO: 4.82 THOUSAND/UL (ref 4.31–10.16)

## 2020-11-27 PROCEDURE — 85025 COMPLETE CBC W/AUTO DIFF WBC: CPT | Performed by: STUDENT IN AN ORGANIZED HEALTH CARE EDUCATION/TRAINING PROGRAM

## 2020-11-27 PROCEDURE — 99232 SBSQ HOSP IP/OBS MODERATE 35: CPT | Performed by: PHYSICIAN ASSISTANT

## 2020-11-27 PROCEDURE — 99232 SBSQ HOSP IP/OBS MODERATE 35: CPT | Performed by: INTERNAL MEDICINE

## 2020-11-27 PROCEDURE — 80048 BASIC METABOLIC PNL TOTAL CA: CPT | Performed by: STUDENT IN AN ORGANIZED HEALTH CARE EDUCATION/TRAINING PROGRAM

## 2020-11-27 RX ORDER — MAGNESIUM CARB/ALUMINUM HYDROX 105-160MG
296 TABLET,CHEWABLE ORAL ONCE
Status: DISCONTINUED | OUTPATIENT
Start: 2020-11-27 | End: 2020-11-28

## 2020-11-27 RX ORDER — POTASSIUM CHLORIDE 14.9 MG/ML
20 INJECTION INTRAVENOUS
Status: COMPLETED | OUTPATIENT
Start: 2020-11-27 | End: 2020-11-27

## 2020-11-27 RX ORDER — POTASSIUM CHLORIDE 20 MEQ/1
20 TABLET, EXTENDED RELEASE ORAL ONCE
Status: COMPLETED | OUTPATIENT
Start: 2020-11-27 | End: 2020-11-27

## 2020-11-27 RX ORDER — SODIUM CHLORIDE 9 MG/ML
95 INJECTION, SOLUTION INTRAVENOUS CONTINUOUS
Status: DISCONTINUED | OUTPATIENT
Start: 2020-11-27 | End: 2020-11-28

## 2020-11-27 RX ORDER — POLYETHYLENE GLYCOL 3350 17 G/17G
17 POWDER, FOR SOLUTION ORAL ONCE
Status: DISCONTINUED | OUTPATIENT
Start: 2020-11-27 | End: 2020-11-28

## 2020-11-27 RX ORDER — POTASSIUM CHLORIDE 20 MEQ/1
40 TABLET, EXTENDED RELEASE ORAL ONCE
Status: COMPLETED | OUTPATIENT
Start: 2020-11-27 | End: 2020-11-27

## 2020-11-27 RX ADMIN — GABAPENTIN 300 MG: 300 CAPSULE ORAL at 20:24

## 2020-11-27 RX ADMIN — DOCUSATE SODIUM 100 MG: 100 CAPSULE ORAL at 08:05

## 2020-11-27 RX ADMIN — SIMETHICONE CHEW TAB 80 MG 80 MG: 80 TABLET ORAL at 16:47

## 2020-11-27 RX ADMIN — Medication 100 MG: at 08:05

## 2020-11-27 RX ADMIN — HYDROMORPHONE HYDROCHLORIDE 0.5 MG: 1 INJECTION, SOLUTION INTRAMUSCULAR; INTRAVENOUS; SUBCUTANEOUS at 16:47

## 2020-11-27 RX ADMIN — SODIUM CHLORIDE 95 ML/HR: 0.9 INJECTION, SOLUTION INTRAVENOUS at 15:00

## 2020-11-27 RX ADMIN — POTASSIUM CHLORIDE 20 MEQ: 1500 TABLET, EXTENDED RELEASE ORAL at 20:24

## 2020-11-27 RX ADMIN — ENOXAPARIN SODIUM 40 MG: 40 INJECTION SUBCUTANEOUS at 08:06

## 2020-11-27 RX ADMIN — PANCRELIPASE 6000 UNITS: 30000; 6000; 19000 CAPSULE, DELAYED RELEASE PELLETS ORAL at 12:01

## 2020-11-27 RX ADMIN — POTASSIUM CHLORIDE 40 MEQ: 1500 TABLET, EXTENDED RELEASE ORAL at 08:05

## 2020-11-27 RX ADMIN — POTASSIUM CHLORIDE 20 MEQ: 14.9 INJECTION, SOLUTION INTRAVENOUS at 15:00

## 2020-11-27 RX ADMIN — PANCRELIPASE 6000 UNITS: 30000; 6000; 19000 CAPSULE, DELAYED RELEASE PELLETS ORAL at 16:47

## 2020-11-27 RX ADMIN — POLYETHYLENE GLYCOL 3350 17 G: 17 POWDER, FOR SOLUTION ORAL at 20:28

## 2020-11-27 RX ADMIN — PANTOPRAZOLE SODIUM 40 MG: 40 TABLET, DELAYED RELEASE ORAL at 05:40

## 2020-11-27 RX ADMIN — GABAPENTIN 300 MG: 300 CAPSULE ORAL at 16:47

## 2020-11-27 RX ADMIN — SIMETHICONE CHEW TAB 80 MG 80 MG: 80 TABLET ORAL at 08:21

## 2020-11-27 RX ADMIN — FOLIC ACID 1 MG: 1 TABLET ORAL at 08:05

## 2020-11-27 RX ADMIN — DOCUSATE SODIUM 100 MG: 100 CAPSULE ORAL at 17:00

## 2020-11-27 RX ADMIN — LEVETIRACETAM 1000 MG: 500 TABLET, FILM COATED ORAL at 20:24

## 2020-11-27 RX ADMIN — LISINOPRIL 10 MG: 10 TABLET ORAL at 08:05

## 2020-11-27 RX ADMIN — HYDROMORPHONE HYDROCHLORIDE 0.5 MG: 1 INJECTION, SOLUTION INTRAMUSCULAR; INTRAVENOUS; SUBCUTANEOUS at 12:06

## 2020-11-27 RX ADMIN — HYDROMORPHONE HYDROCHLORIDE 0.5 MG: 1 INJECTION, SOLUTION INTRAMUSCULAR; INTRAVENOUS; SUBCUTANEOUS at 20:23

## 2020-11-27 RX ADMIN — SIMETHICONE CHEW TAB 80 MG 80 MG: 80 TABLET ORAL at 12:01

## 2020-11-27 RX ADMIN — HYDROMORPHONE HYDROCHLORIDE 0.5 MG: 1 INJECTION, SOLUTION INTRAMUSCULAR; INTRAVENOUS; SUBCUTANEOUS at 01:36

## 2020-11-27 RX ADMIN — Medication 1000 UNITS: at 08:05

## 2020-11-27 RX ADMIN — PANCRELIPASE 6000 UNITS: 30000; 6000; 19000 CAPSULE, DELAYED RELEASE PELLETS ORAL at 08:06

## 2020-11-27 RX ADMIN — LEVETIRACETAM 1000 MG: 500 TABLET, FILM COATED ORAL at 08:05

## 2020-11-27 RX ADMIN — POTASSIUM CHLORIDE 20 MEQ: 14.9 INJECTION, SOLUTION INTRAVENOUS at 17:00

## 2020-11-27 RX ADMIN — HYDROMORPHONE HYDROCHLORIDE 0.5 MG: 1 INJECTION, SOLUTION INTRAMUSCULAR; INTRAVENOUS; SUBCUTANEOUS at 08:06

## 2020-11-27 RX ADMIN — GABAPENTIN 300 MG: 300 CAPSULE ORAL at 08:05

## 2020-11-27 RX ADMIN — VITAM B12 100 MCG: 100 TAB at 08:05

## 2020-11-28 VITALS
TEMPERATURE: 98.4 F | OXYGEN SATURATION: 98 % | WEIGHT: 123.68 LBS | HEART RATE: 103 BPM | DIASTOLIC BLOOD PRESSURE: 89 MMHG | HEIGHT: 66 IN | SYSTOLIC BLOOD PRESSURE: 132 MMHG | RESPIRATION RATE: 16 BRPM | BODY MASS INDEX: 19.88 KG/M2

## 2020-11-28 LAB
ANION GAP SERPL CALCULATED.3IONS-SCNC: 7 MMOL/L (ref 4–13)
BUN SERPL-MCNC: 2 MG/DL (ref 5–25)
CALCIUM SERPL-MCNC: 8.9 MG/DL (ref 8.3–10.1)
CHLORIDE SERPL-SCNC: 96 MMOL/L (ref 100–108)
CO2 SERPL-SCNC: 28 MMOL/L (ref 21–32)
CREAT SERPL-MCNC: 0.47 MG/DL (ref 0.6–1.3)
GFR SERPL CREATININE-BSD FRML MDRD: 126 ML/MIN/1.73SQ M
GLUCOSE SERPL-MCNC: 86 MG/DL (ref 65–140)
POTASSIUM SERPL-SCNC: 4.1 MMOL/L (ref 3.5–5.3)
SODIUM SERPL-SCNC: 131 MMOL/L (ref 136–145)

## 2020-11-28 PROCEDURE — 99239 HOSP IP/OBS DSCHRG MGMT >30: CPT | Performed by: INTERNAL MEDICINE

## 2020-11-28 PROCEDURE — 80048 BASIC METABOLIC PNL TOTAL CA: CPT | Performed by: STUDENT IN AN ORGANIZED HEALTH CARE EDUCATION/TRAINING PROGRAM

## 2020-11-28 RX ORDER — POLYETHYLENE GLYCOL 3350 17 G/17G
17 POWDER, FOR SOLUTION ORAL 2 TIMES DAILY
Status: DISCONTINUED | OUTPATIENT
Start: 2020-11-28 | End: 2020-11-28 | Stop reason: HOSPADM

## 2020-11-28 RX ORDER — OXYCODONE HYDROCHLORIDE 10 MG/1
10 TABLET ORAL EVERY 4 HOURS PRN
Status: DISCONTINUED | OUTPATIENT
Start: 2020-11-28 | End: 2020-11-28 | Stop reason: HOSPADM

## 2020-11-28 RX ORDER — OXYCODONE HYDROCHLORIDE 5 MG/1
5 TABLET ORAL EVERY 4 HOURS PRN
Qty: 10 TABLET | Refills: 0 | Status: SHIPPED | OUTPATIENT
Start: 2020-11-28 | End: 2020-12-08

## 2020-11-28 RX ADMIN — PANTOPRAZOLE SODIUM 40 MG: 40 TABLET, DELAYED RELEASE ORAL at 05:17

## 2020-11-28 RX ADMIN — DOCUSATE SODIUM 100 MG: 100 CAPSULE ORAL at 11:01

## 2020-11-28 RX ADMIN — VITAM B12 100 MCG: 100 TAB at 11:01

## 2020-11-28 RX ADMIN — Medication 100 MG: at 11:01

## 2020-11-28 RX ADMIN — PANCRELIPASE 6000 UNITS: 30000; 6000; 19000 CAPSULE, DELAYED RELEASE PELLETS ORAL at 11:06

## 2020-11-28 RX ADMIN — LISINOPRIL 10 MG: 10 TABLET ORAL at 11:02

## 2020-11-28 RX ADMIN — GABAPENTIN 300 MG: 300 CAPSULE ORAL at 11:01

## 2020-11-28 RX ADMIN — FOLIC ACID 1 MG: 1 TABLET ORAL at 11:02

## 2020-11-28 RX ADMIN — Medication 1000 UNITS: at 11:02

## 2020-11-28 RX ADMIN — ENOXAPARIN SODIUM 40 MG: 40 INJECTION SUBCUTANEOUS at 11:03

## 2020-11-28 RX ADMIN — POLYETHYLENE GLYCOL 3350 17 G: 17 POWDER, FOR SOLUTION ORAL at 11:03

## 2020-11-28 RX ADMIN — LEVETIRACETAM 1000 MG: 500 TABLET, FILM COATED ORAL at 11:01

## 2020-11-28 RX ADMIN — SODIUM CHLORIDE 95 ML/HR: 0.9 INJECTION, SOLUTION INTRAVENOUS at 05:18

## 2020-11-28 RX ADMIN — HYDROMORPHONE HYDROCHLORIDE 0.5 MG: 1 INJECTION, SOLUTION INTRAMUSCULAR; INTRAVENOUS; SUBCUTANEOUS at 05:17

## 2020-11-28 RX ADMIN — HYDROMORPHONE HYDROCHLORIDE 0.5 MG: 1 INJECTION, SOLUTION INTRAMUSCULAR; INTRAVENOUS; SUBCUTANEOUS at 00:51

## 2020-11-28 RX ADMIN — SIMETHICONE CHEW TAB 80 MG 80 MG: 80 TABLET ORAL at 11:02

## 2020-11-30 ENCOUNTER — TRANSITIONAL CARE MANAGEMENT (OUTPATIENT)
Dept: FAMILY MEDICINE CLINIC | Facility: CLINIC | Age: 55
End: 2020-11-30

## 2020-12-01 ENCOUNTER — OFFICE VISIT (OUTPATIENT)
Dept: HEMATOLOGY ONCOLOGY | Facility: CLINIC | Age: 55
End: 2020-12-01
Payer: COMMERCIAL

## 2020-12-01 VITALS
BODY MASS INDEX: 20.57 KG/M2 | HEIGHT: 66 IN | DIASTOLIC BLOOD PRESSURE: 71 MMHG | SYSTOLIC BLOOD PRESSURE: 89 MMHG | HEART RATE: 91 BPM | WEIGHT: 128 LBS | TEMPERATURE: 98.6 F

## 2020-12-01 DIAGNOSIS — E61.1 IRON DEFICIENCY: Primary | ICD-10-CM

## 2020-12-01 PROCEDURE — 4004F PT TOBACCO SCREEN RCVD TLK: CPT | Performed by: NURSE PRACTITIONER

## 2020-12-01 PROCEDURE — 99214 OFFICE O/P EST MOD 30 MIN: CPT | Performed by: NURSE PRACTITIONER

## 2020-12-02 ENCOUNTER — OFFICE VISIT (OUTPATIENT)
Dept: FAMILY MEDICINE CLINIC | Facility: CLINIC | Age: 55
End: 2020-12-02
Payer: COMMERCIAL

## 2020-12-02 VITALS
SYSTOLIC BLOOD PRESSURE: 102 MMHG | HEIGHT: 66 IN | WEIGHT: 128.2 LBS | DIASTOLIC BLOOD PRESSURE: 88 MMHG | RESPIRATION RATE: 18 BRPM | TEMPERATURE: 98.9 F | OXYGEN SATURATION: 97 % | BODY MASS INDEX: 20.6 KG/M2 | HEART RATE: 90 BPM

## 2020-12-02 DIAGNOSIS — F10.20 CONTINUOUS CHRONIC ALCOHOLISM (HCC): ICD-10-CM

## 2020-12-02 DIAGNOSIS — Z23 ENCOUNTER FOR IMMUNIZATION: ICD-10-CM

## 2020-12-02 DIAGNOSIS — E61.1 IRON DEFICIENCY: ICD-10-CM

## 2020-12-02 DIAGNOSIS — K86.1 ACUTE ON CHRONIC PANCREATITIS (HCC): ICD-10-CM

## 2020-12-02 DIAGNOSIS — K85.90 ACUTE ON CHRONIC PANCREATITIS (HCC): ICD-10-CM

## 2020-12-02 DIAGNOSIS — Z76.89 ENCOUNTER FOR SUPPORT AND COORDINATION OF TRANSITION OF CARE: Primary | ICD-10-CM

## 2020-12-02 DIAGNOSIS — M50.10 CERVICAL DISC SYNDROME: ICD-10-CM

## 2020-12-02 PROBLEM — L02.415 ABSCESS OF RIGHT LEG: Status: RESOLVED | Noted: 2018-06-02 | Resolved: 2020-12-02

## 2020-12-02 PROBLEM — Z72.0 TOBACCO USE: Status: RESOLVED | Noted: 2020-08-22 | Resolved: 2020-12-02

## 2020-12-02 PROBLEM — K85.20 PANCREATITIS, ALCOHOLIC, ACUTE: Status: RESOLVED | Noted: 2020-08-20 | Resolved: 2020-12-02

## 2020-12-02 PROBLEM — R10.13 EPIGASTRIC PAIN: Status: RESOLVED | Noted: 2020-11-22 | Resolved: 2020-12-02

## 2020-12-02 PROBLEM — J69.0 ASPIRATION PNEUMONIA (HCC): Status: RESOLVED | Noted: 2019-09-13 | Resolved: 2020-12-02

## 2020-12-02 PROBLEM — R56.9 SEIZURE (HCC): Status: RESOLVED | Noted: 2017-10-05 | Resolved: 2020-12-02

## 2020-12-02 PROCEDURE — T1015 CLINIC SERVICE: HCPCS | Performed by: FAMILY MEDICINE

## 2020-12-02 PROCEDURE — 3008F BODY MASS INDEX DOCD: CPT | Performed by: NURSE PRACTITIONER

## 2020-12-02 RX ORDER — LEVETIRACETAM 1000 MG/1
1000 TABLET ORAL EVERY 12 HOURS
Qty: 60 TABLET | Refills: 2 | Status: SHIPPED | OUTPATIENT
Start: 2020-12-02 | End: 2022-04-22 | Stop reason: SDUPTHER

## 2020-12-02 RX ORDER — FUROSEMIDE 20 MG/1
20 TABLET ORAL DAILY
COMMUNITY
End: 2022-04-29 | Stop reason: CLARIF

## 2020-12-09 ENCOUNTER — TELEPHONE (OUTPATIENT)
Dept: GASTROENTEROLOGY | Facility: CLINIC | Age: 55
End: 2020-12-09

## 2021-02-23 ENCOUNTER — LAB (OUTPATIENT)
Dept: LAB | Facility: MEDICAL CENTER | Age: 56
End: 2021-02-23
Payer: COMMERCIAL

## 2021-02-23 DIAGNOSIS — E61.1 IRON DEFICIENCY: ICD-10-CM

## 2021-02-23 LAB
ALBUMIN SERPL BCP-MCNC: 4.2 G/DL (ref 3.5–5)
ALP SERPL-CCNC: 150 U/L (ref 46–116)
ALT SERPL W P-5'-P-CCNC: 31 U/L (ref 12–78)
ANION GAP SERPL CALCULATED.3IONS-SCNC: 7 MMOL/L (ref 4–13)
AST SERPL W P-5'-P-CCNC: 92 U/L (ref 5–45)
BASOPHILS # BLD AUTO: 0.05 THOUSANDS/ΜL (ref 0–0.1)
BASOPHILS NFR BLD AUTO: 1 % (ref 0–1)
BILIRUB SERPL-MCNC: 0.53 MG/DL (ref 0.2–1)
BUN SERPL-MCNC: 3 MG/DL (ref 5–25)
CALCIUM SERPL-MCNC: 9.1 MG/DL (ref 8.3–10.1)
CHLORIDE SERPL-SCNC: 92 MMOL/L (ref 100–108)
CO2 SERPL-SCNC: 30 MMOL/L (ref 21–32)
CREAT SERPL-MCNC: 0.59 MG/DL (ref 0.6–1.3)
EOSINOPHIL # BLD AUTO: 0.05 THOUSAND/ΜL (ref 0–0.61)
EOSINOPHIL NFR BLD AUTO: 1 % (ref 0–6)
ERYTHROCYTE [DISTWIDTH] IN BLOOD BY AUTOMATED COUNT: 17.2 % (ref 11.6–15.1)
FERRITIN SERPL-MCNC: 20 NG/ML (ref 8–388)
GFR SERPL CREATININE-BSD FRML MDRD: 115 ML/MIN/1.73SQ M
GLUCOSE P FAST SERPL-MCNC: 137 MG/DL (ref 65–99)
HCT VFR BLD AUTO: 39.8 % (ref 36.5–49.3)
HGB BLD-MCNC: 12.6 G/DL (ref 12–17)
IMM GRANULOCYTES # BLD AUTO: 0.01 THOUSAND/UL (ref 0–0.2)
IMM GRANULOCYTES NFR BLD AUTO: 0 % (ref 0–2)
IRON SATN MFR SERPL: 4 %
IRON SERPL-MCNC: 17 UG/DL (ref 65–175)
LYMPHOCYTES # BLD AUTO: 1.97 THOUSANDS/ΜL (ref 0.6–4.47)
LYMPHOCYTES NFR BLD AUTO: 38 % (ref 14–44)
MCH RBC QN AUTO: 28.4 PG (ref 26.8–34.3)
MCHC RBC AUTO-ENTMCNC: 31.7 G/DL (ref 31.4–37.4)
MCV RBC AUTO: 90 FL (ref 82–98)
MONOCYTES # BLD AUTO: 0.58 THOUSAND/ΜL (ref 0.17–1.22)
MONOCYTES NFR BLD AUTO: 11 % (ref 4–12)
NEUTROPHILS # BLD AUTO: 2.52 THOUSANDS/ΜL (ref 1.85–7.62)
NEUTS SEG NFR BLD AUTO: 49 % (ref 43–75)
NRBC BLD AUTO-RTO: 0 /100 WBCS
PLATELET # BLD AUTO: 134 THOUSANDS/UL (ref 149–390)
PMV BLD AUTO: 10.5 FL (ref 8.9–12.7)
POTASSIUM SERPL-SCNC: 4.1 MMOL/L (ref 3.5–5.3)
PROT SERPL-MCNC: 9 G/DL (ref 6.4–8.2)
RBC # BLD AUTO: 4.44 MILLION/UL (ref 3.88–5.62)
SODIUM SERPL-SCNC: 129 MMOL/L (ref 136–145)
TIBC SERPL-MCNC: 473 UG/DL (ref 250–450)
WBC # BLD AUTO: 5.18 THOUSAND/UL (ref 4.31–10.16)

## 2021-02-23 PROCEDURE — 36415 COLL VENOUS BLD VENIPUNCTURE: CPT | Performed by: PHYSICIAN ASSISTANT

## 2021-02-23 PROCEDURE — 83540 ASSAY OF IRON: CPT | Performed by: NURSE PRACTITIONER

## 2021-02-23 PROCEDURE — 80053 COMPREHEN METABOLIC PANEL: CPT | Performed by: PHYSICIAN ASSISTANT

## 2021-02-23 PROCEDURE — 83550 IRON BINDING TEST: CPT | Performed by: NURSE PRACTITIONER

## 2021-02-23 PROCEDURE — 82728 ASSAY OF FERRITIN: CPT | Performed by: NURSE PRACTITIONER

## 2021-02-23 PROCEDURE — 85025 COMPLETE CBC W/AUTO DIFF WBC: CPT | Performed by: PHYSICIAN ASSISTANT

## 2021-03-02 ENCOUNTER — HOSPITAL ENCOUNTER (OUTPATIENT)
Dept: MRI IMAGING | Facility: HOSPITAL | Age: 56
Discharge: HOME | End: 2021-03-02
Payer: COMMERCIAL

## 2021-03-02 DIAGNOSIS — K85.20 ALCOHOL-INDUCED ACUTE PANCREATITIS, UNSPECIFIED COMPLICATION STATUS: ICD-10-CM

## 2021-03-02 PROCEDURE — 74183 MRI ABD W/O CNTR FLWD CNTR: CPT

## 2021-03-02 PROCEDURE — 76376 3D RENDER W/INTRP POSTPROCES: CPT

## 2021-03-02 PROCEDURE — A9585 GADOBUTROL INJECTION: HCPCS | Performed by: PHYSICIAN ASSISTANT

## 2021-03-02 PROCEDURE — G1004 CDSM NDSC: HCPCS

## 2021-03-02 RX ADMIN — GADOBUTROL 6 ML: 604.72 INJECTION INTRAVENOUS at 09:56

## 2021-03-05 ENCOUNTER — OFFICE VISIT (OUTPATIENT)
Dept: HEMATOLOGY ONCOLOGY | Facility: CLINIC | Age: 56
End: 2021-03-05
Payer: COMMERCIAL

## 2021-03-05 ENCOUNTER — LAB (OUTPATIENT)
Dept: LAB | Facility: MEDICAL CENTER | Age: 56
End: 2021-03-05
Payer: COMMERCIAL

## 2021-03-05 VITALS
HEIGHT: 66 IN | BODY MASS INDEX: 19.29 KG/M2 | WEIGHT: 120 LBS | SYSTOLIC BLOOD PRESSURE: 103 MMHG | TEMPERATURE: 98 F | DIASTOLIC BLOOD PRESSURE: 74 MMHG | HEART RATE: 112 BPM

## 2021-03-05 DIAGNOSIS — E61.1 IRON DEFICIENCY: ICD-10-CM

## 2021-03-05 DIAGNOSIS — D50.9 MICROCYTIC ANEMIA: ICD-10-CM

## 2021-03-05 DIAGNOSIS — K21.9 GASTROESOPHAGEAL REFLUX DISEASE, UNSPECIFIED WHETHER ESOPHAGITIS PRESENT: ICD-10-CM

## 2021-03-05 DIAGNOSIS — D50.0 IRON DEFICIENCY ANEMIA DUE TO CHRONIC BLOOD LOSS: ICD-10-CM

## 2021-03-05 DIAGNOSIS — D50.0 IRON DEFICIENCY ANEMIA DUE TO CHRONIC BLOOD LOSS: Primary | ICD-10-CM

## 2021-03-05 DIAGNOSIS — K21.9 GASTROESOPHAGEAL REFLUX DISEASE, UNSPECIFIED WHETHER ESOPHAGITIS PRESENT: Primary | ICD-10-CM

## 2021-03-05 LAB
ALBUMIN SERPL BCP-MCNC: 3.8 G/DL (ref 3.5–5)
ALP SERPL-CCNC: 122 U/L (ref 46–116)
ALT SERPL W P-5'-P-CCNC: 36 U/L (ref 12–78)
ANION GAP SERPL CALCULATED.3IONS-SCNC: 8 MMOL/L (ref 4–13)
AST SERPL W P-5'-P-CCNC: 113 U/L (ref 5–45)
BASOPHILS # BLD AUTO: 0.03 THOUSANDS/ΜL (ref 0–0.1)
BASOPHILS NFR BLD AUTO: 1 % (ref 0–1)
BILIRUB SERPL-MCNC: 0.63 MG/DL (ref 0.2–1)
BUN SERPL-MCNC: 4 MG/DL (ref 5–25)
CALCIUM SERPL-MCNC: 9 MG/DL (ref 8.3–10.1)
CHLORIDE SERPL-SCNC: 91 MMOL/L (ref 100–108)
CO2 SERPL-SCNC: 30 MMOL/L (ref 21–32)
CREAT SERPL-MCNC: 0.53 MG/DL (ref 0.6–1.3)
EOSINOPHIL # BLD AUTO: 0.06 THOUSAND/ΜL (ref 0–0.61)
EOSINOPHIL NFR BLD AUTO: 1 % (ref 0–6)
ERYTHROCYTE [DISTWIDTH] IN BLOOD BY AUTOMATED COUNT: 16.5 % (ref 11.6–15.1)
FERRITIN SERPL-MCNC: 19 NG/ML (ref 8–388)
GFR SERPL CREATININE-BSD FRML MDRD: 120 ML/MIN/1.73SQ M
GLUCOSE P FAST SERPL-MCNC: 105 MG/DL (ref 65–99)
HCT VFR BLD AUTO: 33.9 % (ref 36.5–49.3)
HGB BLD-MCNC: 10.9 G/DL (ref 12–17)
IMM GRANULOCYTES # BLD AUTO: 0.01 THOUSAND/UL (ref 0–0.2)
IMM GRANULOCYTES NFR BLD AUTO: 0 % (ref 0–2)
IRON SATN MFR SERPL: 3 %
IRON SERPL-MCNC: 14 UG/DL (ref 65–175)
LYMPHOCYTES # BLD AUTO: 1.41 THOUSANDS/ΜL (ref 0.6–4.47)
LYMPHOCYTES NFR BLD AUTO: 32 % (ref 14–44)
MCH RBC QN AUTO: 28.3 PG (ref 26.8–34.3)
MCHC RBC AUTO-ENTMCNC: 32.2 G/DL (ref 31.4–37.4)
MCV RBC AUTO: 88 FL (ref 82–98)
MONOCYTES # BLD AUTO: 0.75 THOUSAND/ΜL (ref 0.17–1.22)
MONOCYTES NFR BLD AUTO: 17 % (ref 4–12)
NEUTROPHILS # BLD AUTO: 2.14 THOUSANDS/ΜL (ref 1.85–7.62)
NEUTS SEG NFR BLD AUTO: 49 % (ref 43–75)
NRBC BLD AUTO-RTO: 0 /100 WBCS
PLATELET # BLD AUTO: 154 THOUSANDS/UL (ref 149–390)
PMV BLD AUTO: 10.5 FL (ref 8.9–12.7)
POTASSIUM SERPL-SCNC: 3.5 MMOL/L (ref 3.5–5.3)
PROT SERPL-MCNC: 8.4 G/DL (ref 6.4–8.2)
RBC # BLD AUTO: 3.85 MILLION/UL (ref 3.88–5.62)
SODIUM SERPL-SCNC: 129 MMOL/L (ref 136–145)
TIBC SERPL-MCNC: 457 UG/DL (ref 250–450)
WBC # BLD AUTO: 4.4 THOUSAND/UL (ref 4.31–10.16)

## 2021-03-05 PROCEDURE — 3008F BODY MASS INDEX DOCD: CPT | Performed by: INTERNAL MEDICINE

## 2021-03-05 PROCEDURE — 99214 OFFICE O/P EST MOD 30 MIN: CPT | Performed by: NURSE PRACTITIONER

## 2021-03-05 PROCEDURE — 82728 ASSAY OF FERRITIN: CPT

## 2021-03-05 PROCEDURE — 36415 COLL VENOUS BLD VENIPUNCTURE: CPT

## 2021-03-05 PROCEDURE — 80053 COMPREHEN METABOLIC PANEL: CPT

## 2021-03-05 PROCEDURE — 85025 COMPLETE CBC W/AUTO DIFF WBC: CPT

## 2021-03-05 PROCEDURE — 83550 IRON BINDING TEST: CPT

## 2021-03-05 PROCEDURE — 83540 ASSAY OF IRON: CPT

## 2021-03-05 RX ORDER — SODIUM CHLORIDE 9 MG/ML
20 INJECTION, SOLUTION INTRAVENOUS ONCE
Status: CANCELLED | OUTPATIENT
Start: 2021-03-22

## 2021-03-05 NOTE — PROGRESS NOTES
[facility] [address] [facility]   [address]  [facility] [address]  [phone]  Hematology Ambulatory [address] [facility] [place], 1965, [phone]  3/5/2021    Assessment/Plan:    1  Iron deficiency   Patient is a 69-year-old male with a history of iron deficiency likely secondary to  Decreased intake and chronic blood loss  Patient has a history of chronic alcoholism, drinking at least a 6 pack per day, decreased appetite and bouts of pancreatitis the most recent in November 2020  He was scheduled to see Gastroenterology on February 17, 21 however missed that appointment  He states over the past 3 days he has had frequent diarrhea with black stool noted  He also reports coughing up black mucus  I reviewed that he had an appointment in February 2021 with GI that was missed and instructed him to reschedule in the near future  Patient did have a an MRI of the abdomen on 03/02/2021 which revealed decreasing size of complicated collection arising from the pancreatic head and a 1 1 cm complicated cystic lesion in the uncinate process appears unchanged from November 2020  Both lesions suggest pseudocyst of varying age related to recurrent bouts of pancreatitis  February 23, 21, iron saturation is 4% with a ferritin level of 20 however his hemoglobin is stable at 12 6 with a platelet count of 998  Patient has had Venofer iron infusions in the past which we will arrange for the next few weeks  Overall patient reports increased fatigue however is able to function without restriction  We will continue to monitor with blood work in 3 months  Patient verbalized understanding and is in agreement with the plan  - CBC and differential; Future  - Comprehensive metabolic panel; Future  - Iron Panel (Includes Ferritin, Iron Sat%, Iron, and TIBC);  Future    Barrier(s) to care: None  The patient is able to self care   ------------------------------------------------------------------------------------------------------    History of present illness:     Interval history:    Review of Systems   Constitutional: Positive for fatigue  Negative for activity change, appetite change, fever and unexpected weight change  Respiratory: Positive for cough  Negative for shortness of breath  Coughing up black mucus   Cardiovascular: Negative for chest pain and leg swelling  Gastrointestinal: Positive for diarrhea  Negative for abdominal pain, constipation and nausea  Black stools   Endocrine: Negative for cold intolerance and heat intolerance  Musculoskeletal: Negative for arthralgias and myalgias  Skin: Negative  Neurological: Negative for dizziness, weakness and headaches  Hematological: Negative for adenopathy  Does not bruise/bleed easily         Patient Active Problem List   Diagnosis    Tobacco abuse    Essential hypertension    H/O suicide attempt    H/O cervical spine surgery    Hyponatremia    Dietary folate deficiency anemia    Ventral hernia without obstruction or gangrene    Hepatomegaly    Hepatic steatosis    Elevated alkaline phosphatase level    Alcoholic hepatitis without ascites    Vitamin D deficiency    Iron deficiency    Urinary retention    Bladder wall thickening    Generalized weakness    Malnutrition of moderate degree (HCC)    Hypokalemia    Seizures (HCC)    Continuous chronic alcoholism (HCC)    Incisional hernia    Hx of seizure disorder    Seizure-like activity (HCC)    Diarrhea    Abnormality of pancreatic duct    Allergic rhinitis    Microcytic anemia    Abdominal wound dehiscence    Cervical disc disorder with myelopathy    Cervical spinal stenosis    Depression    Left foot drop    Lumbar radiculopathy    Neuropathy involving both lower extremities    Peripheral neuropathy    T6 vertebral fracture (HCC)    Alcoholic cirrhosis of liver without ascites (HCC)    Thrombocytopenia (HCC)    Closed fracture of left olecranon process, initial encounter    Iron deficiency anemia due to chronic blood loss    Duodenal ulcer    Tubular adenoma    Traore esophagus    Celiac artery stenosis (HCC)    Pancreatic mass    Pancytopenia (HCC)    Constipation    Transaminitis    Lesion of spleen    Left anterior fascicular block    Abnormal EKG    Acute on chronic pancreatitis (HCC)    Pancreatic pseudocyst    COPD (chronic obstructive pulmonary disease) (HCC)    Ileus (HCC)       Past Medical History:   Diagnosis Date    Alcohol abuse     Traore esophagus     Bowel obstruction (HCC)     Bowel perforation (HCC)     Cardiac disease     Continuous chronic alcoholism ([facility] [address] ) 10/5/2017    COPD (chronic obstructive pulmonary disease) (HCC)     History of shoulder surgery     Right shoulder    History of transfusion     Hx of cervical spine surgery     Hypertension     Incisional hernia 10/8/2017    MI, old     Mitral regurgitation     Psychiatric disorder     Seizures ([facility] [address] )        Past Surgical History:   Procedure Laterality Date    APPENDECTOMY      BACK SURGERY      CHOLECYSTECTOMY      ESOPHAGOGASTRODUODENOSCOPY N/A 11/28/2016    Procedure: ESOPHAGOGASTRODUODENOSCOPY (EGD); Surgeon: Hans Trevino MD;  Location:  GI LAB;   Service:    [address] [place] [address]      LAPAROTOMY N/A 10/25/2016    Procedure: LAPAROTOMY EXPLORATORY;  Surgeon: Blas Silvestre MD;  Location: [facility] OR;  Service:    [facility] MOUTH SURGERY      PERCUTANEOUS PINNING FEMORAL NECK FRACTURE      SHOULDER SURGERY Right     SHOULDER SURGERY      SMALL INTESTINE SURGERY      STOMACH SURGERY      bal surgery       Family History   Problem Relation Age of Onset    Breast cancer Mother     Prostate cancer Father     Skin cancer Brother        Social History     Socioeconomic History    Marital status: Single     Spouse name: None    Number of children: None  Years of education: None    Highest education level: None   Occupational History    None   Social Needs    Financial resource strain: None    Food insecurity     Worry: None     Inability: None    Transportation needs     Medical: None     Non-medical: None   Tobacco Use    Smoking status: Current Every Day Smoker     Packs/day: 1 00     Years: 15 00     Pack years: 15 00     Types: Cigarettes    Smokeless tobacco: Never Used   Substance and Sexual Activity    Alcohol use:  Yes     Alcohol/week: 6 0 standard drinks     Types: 6 Cans of beer per week     Frequency: 4 or more times a week     Drinks per session: 5 or 6     Binge frequency: Monthly     Comment: 4    Drug use: No    Sexual activity: Not Currently     Partners: Female   Lifestyle    Physical activity     Days per week: None     Minutes per session: None    Stress: None   Relationships    Social connections     Talks on phone: None     Gets together: None     Attends [employer] service: None     Active member of club or organization: None     Attends meetings of clubs or organizations: None     Relationship status: None    Intimate partner violence     Fear of current or ex partner: None     Emotionally abused: None     Physically abused: None     Forced sexual activity: None   Other Topics Concern    None   Social History Narrative    None         Current Outpatient Medications:     albuterol (2 5 mg/3 mL) 0 083 % nebulizer solution, Take 2 5 mg by nebulization every 6 (six) hours as needed for wheezing or shortness of breath, Disp: , Rfl:     albuterol (PROVENTIL HFA,VENTOLIN HFA) 90 mcg/act inhaler, Inhale 2 puffs every 4 (four) hours as needed for wheezing, Disp: 18 each, Rfl: 3    amitriptyline (ELAVIL) 25 mg tablet, Take 1 tablet (25 mg total) by mouth daily at bedtime as needed for sleep, Disp: 30 tablet, Rfl: 3    Cholecalciferol 25 MCG (1000 UT) tablet, Take 1 tablet (1,000 Units total) by mouth daily, Disp: 30 tablet, Rfl: 0    cyanocobalamin (VITAMIN B-12) 100 mcg tablet, Take 1 tablet (100 mcg total) by mouth daily, Disp: 30 tablet, Rfl: 5    docusate sodium (COLACE) 100 mg capsule, Take 1 capsule (100 mg total) by mouth 2 (two) times a day as needed for constipation, Disp: 30 capsule, Rfl: 0    esomeprazole (NexIUM) 40 MG capsule, Take 1 capsule (40 mg total) by mouth 2 (two) times a day before meals, Disp: 60 capsule, Rfl: 3    folic acid (FOLVITE) 1 mg tablet, Take 1 tablet (1 mg total) by mouth daily, Disp: 30 tablet, Rfl: 0    furosemide (LASIX) 20 mg tablet, Take 20 mg by mouth daily, Disp: , Rfl:     gabapentin (NEURONTIN) 100 mg capsule, Take 1 capsule (100 mg total) by mouth 3 (three) times a day, Disp: 90 capsule, Rfl: 3    levETIRAcetam (KEPPRA) 1000 MG tablet, Take 1 tablet (1,000 mg total) by mouth every 12 (twelve) hours, Disp: 60 tablet, Rfl: 2    lisinopril (ZESTRIL) 10 mg tablet, Take 1 tablet (10 mg total) by mouth daily, Disp: 30 tablet, Rfl: 5    Multiple Vitamin (TAB-A-BONI PO), Take 1 tablet by mouth daily, Disp: , Rfl:     pancrelipase, Lip-Prot-Amyl, (CREON) 6,000 units delayed release capsule, Take 6,000 units of lipase by mouth 3 (three) times a day with meals, Disp: 90 capsule, Rfl: 2    thiamine 100 MG tablet, Take 1 tablet (100 mg total) by mouth daily, Disp: 30 tablet, Rfl: 0    No Known Allergies    Objective:  /74   Pulse (!) 112   Temp 98 °F (36 7 °C)   Ht 5' 6" (1 676 m)   Wt 54 4 kg (120 lb)   BMI 19 37 kg/m²    Physical Exam    Result Review  Labs:  No visits with results within 1 Week(s) from this visit     Latest known visit with results is:   Office Visit on 12/02/2020   Component Date Value Ref Range Status    WBC 02/23/2021 5 18  4 31 - 10 16 Thousand/uL Final    RBC 02/23/2021 4 44  3 88 - 5 62 Million/uL Final    Hemoglobin 02/23/2021 12 6  12 0 - 17 0 g/dL Final    Hematocrit 02/23/2021 39 8  36 5 - 49 3 % Final    MCV 02/23/2021 90  82 - 98 fL Final   [facility] 02/23/2021 28 4  26 8 - 34 3 pg Final    MCHC 02/23/2021 31 7  31 4 - 37 4 g/dL Final    RDW 02/23/2021 17 2* 11 6 - 15 1 % Final    MPV 02/23/2021 10 5  8 9 - 12 7 fL Final    Platelets 77/04/3659 134* 149 - 390 Thousands/uL Final    nRBC 02/23/2021 0  /100 WBCs Final    Neutrophils Relative 02/23/2021 49  43 - 75 % Final    Immat GRANS % 02/23/2021 0  0 - 2 % Final    Lymphocytes Relative 02/23/2021 38  14 - 44 % Final    Monocytes Relative 02/23/2021 11  4 - 12 % Final    Eosinophils Relative 02/23/2021 1  0 - 6 % Final    Basophils Relative 02/23/2021 1  0 - 1 % Final    Neutrophils Absolute 02/23/2021 2 52  1 85 - 7 62 Thousands/µL Final    Immature Grans Absolute 02/23/2021 0 01  0 00 - 0 20 Thousand/uL Final    Lymphocytes Absolute 02/23/2021 1 97  0 60 - 4 47 Thousands/µL Final    Monocytes Absolute 02/23/2021 0 58  0 17 - 1 22 Thousand/µL Final    Eosinophils Absolute 02/23/2021 0 05  0 00 - 0 61 Thousand/µL Final    Basophils Absolute 02/23/2021 0 05  0 00 - 0 10 Thousands/µL Final    Sodium 02/23/2021 129* 136 - 145 mmol/L Final    Potassium 02/23/2021 4 1  3 5 - 5 3 mmol/L Final    Chloride 02/23/2021 92* 100 - 108 mmol/L Final    CO2 02/23/2021 30  21 - 32 mmol/L Final    ANION GAP 02/23/2021 7  4 - 13 mmol/L Final    BUN 02/23/2021 3* 5 - 25 mg/dL Final    Creatinine 02/23/2021 0 59* 0 60 - 1 30 mg/dL Final    Standardized to IDMS reference method    Glucose, Fasting 02/23/2021 137* 65 - 99 mg/dL Final    Specimen collection should occur prior to Sulfasalazine administration due to the potential for falsely depressed results  Specimen collection should occur prior to Sulfapyridine administration due to the potential for falsely elevated results   Calcium 02/23/2021 9 1  8 3 - 10 1 mg/dL Final    AST 02/23/2021 92* 5 - 45 U/L Final    Specimen collection should occur prior to Sulfasalazine administration due to the potential for falsely depressed results       ALT 02/23/2021 31  12 - 78 U/L Final    Specimen collection should occur prior to Sulfasalazine and/or Sulfapyridine administration due to the potential for falsely depressed results   Alkaline Phosphatase 02/23/2021 150* 46 - 116 U/L Final    Total Protein 02/23/2021 9 0* 6 4 - 8 2 g/dL Final    Albumin 02/23/2021 4 2  3 5 - 5 0 g/dL Final    Total Bilirubin 02/23/2021 0 53  0 20 - 1 00 mg/dL Final    Use of this assay is not recommended for patients undergoing treatment with eltrombopag due to the potential for falsely elevated results   eGFR 02/23/2021 115  ml/min/1 73sq m Final         Please note: This report has been generated by a voice recognition software system  Therefore there may be syntax, spelling, and/or grammatical errors  Please call if you have any questions

## 2021-03-09 ENCOUNTER — TELEPHONE (OUTPATIENT)
Dept: GASTROENTEROLOGY | Facility: CLINIC | Age: 56
End: 2021-03-09

## 2021-03-09 ENCOUNTER — TELEMEDICINE (OUTPATIENT)
Dept: GASTROENTEROLOGY | Facility: CLINIC | Age: 56
End: 2021-03-09
Payer: COMMERCIAL

## 2021-03-09 DIAGNOSIS — K76.0 HEPATIC STEATOSIS: ICD-10-CM

## 2021-03-09 DIAGNOSIS — K85.90 ACUTE ON CHRONIC PANCREATITIS (HCC): ICD-10-CM

## 2021-03-09 DIAGNOSIS — K70.30 ALCOHOLIC CIRRHOSIS OF LIVER WITHOUT ASCITES (HCC): ICD-10-CM

## 2021-03-09 DIAGNOSIS — F10.20 CONTINUOUS CHRONIC ALCOHOLISM (HCC): ICD-10-CM

## 2021-03-09 DIAGNOSIS — K86.1 ACUTE ON CHRONIC PANCREATITIS (HCC): ICD-10-CM

## 2021-03-09 DIAGNOSIS — D50.9 MICROCYTIC ANEMIA: Primary | ICD-10-CM

## 2021-03-09 PROCEDURE — 99214 OFFICE O/P EST MOD 30 MIN: CPT | Performed by: INTERNAL MEDICINE

## 2021-03-09 PROCEDURE — 4004F PT TOBACCO SCREEN RCVD TLK: CPT | Performed by: INTERNAL MEDICINE

## 2021-03-09 NOTE — TELEPHONE ENCOUNTER
----- Message from Gurpreet Garcia MD sent at 3/9/2021  2:18 PM EST -----  Dear Staff,     I have seen this patient as a virtual visit  During the visit the following tests/ recommendations were made  Please send the patient the requisitions for the tests and help them with scheduling as needed  Please also send them the instructions (if any listed) by mail/ email  Thank you       Gurpreet Garcia MD      Procedures : EGD and Colonoscopy , Elective    Patient requesting  [facility]    Testing : No testing ordered     Referrals : None    Preparation for Colonoscopy : Golytely/ dulcolax    Follow up : [place] in 2 month    Instructions : None

## 2021-03-10 NOTE — PROGRESS NOTES
Virtual Regular Visit      Assessment/Plan:    Problem List Items Addressed This Visit        Digestive    Hepatic steatosis    Alcoholic cirrhosis of liver without ascites ([address] )    Acute on chronic pancreatitis (HCC)       Other    Continuous chronic alcoholism (HCC)    Microcytic anemia - Primary    Relevant Medications    polyethylene glycol (GOLYTELY) 4000 mL solution    Other Relevant Orders    Colonoscopy    EGD          1  Microcytic anemia  - Colonoscopy; Future  - EGD; Future  - polyethylene glycol (GOLYTELY) 4000 mL solution; Take 4,000 mL by mouth once for 1 dose  Dispense: 4000 mL; Refill: 0    2  Hepatic steatosis  3  Alcoholic cirrhosis of liver without ascites ([address] )  4  Continuous chronic alcoholism ([address] )  5  Acute on chronic pancreatitis ([address] )      - discuss alcohol abstinence he is interested in cutting back but not completely  -  He recently was identified to have 2 8 cm x 2 cm  Pancreatic head collection which is likely secondary to previous history of pancreatitis with likely sequela such as pseudocyst   Again important reasons for alcohol abstinence but he is currently not interested  -  He requires iron infusion due to chronic anemia  He has had previous endoscopic evaluation over the last 2 years including EGD, colonoscopy, and PillCam evaluation without any clear cause of anemia identified  Due to chronicity of anemia associated with report of melena will plan for EGD and colonoscopy evaluation   -  I have recommended low-sodium diet, avoidance of NSAID and avoidance of alcohol  - he does have findings concerning for cirrhosis on recent MRI and previous imaging study  He will need close monitoring as he is at increased risk of [address]    -  He will benefit from a low-sodium diet      Reason for visit is   Chief Complaint   Patient presents with    Virtual Regular Visit        Encounter provider Kathi Holly MD    Provider located at [place] [address] [place] [id number]  [address] [id number] [phone]      Recent Visits  No visits were found meeting these conditions  Showing recent visits within past 7 days and meeting all other requirements     Today's Visits  Date Type Provider Dept   03/09/21 Telephone Brian Burgos   Showing today's visits and meeting all other requirements     Future Appointments  No visits were found meeting these conditions  Showing future appointments within next 150 days and meeting all other requirements        The patient was identified by name and date of birth  Maris Whitney was informed that this is a telemedicine visit and that the visit is being conducted through [facility] and patient was informed that this is a secure, HIPAA-compliant platform  He agrees to proceed     My office door was closed  No one else was in the room  He acknowledged consent and understanding of privacy and security of the video platform  The patient has agreed to participate and understands they can discontinue the visit at any time  Patient is aware this is a billable service  Subjective  Maris Whitney is a 48 y o  male  Follow-up for history of longstanding alcohol abuse complicated by acute on chronic pancreatitis, imaging findings concerning for cirrhosis, history of pseudocyst  He also has history of iron deficiency anemia unclear etiology  He has had extensive evaluation including EGD, colonoscopy, and PillCam evaluation over the last 2 years  He continues require iron infusion  He continues also consume excessive amount of alcohol  He reported intermittent melena  Unfortunately has poor insight into severity of  Illness at this time  Most recent hemoglobin was approximately 10  Most recent iron study showed low ferritin with iron saturation of 4%      EGD in 2019- Possible short-segment Traore's esophagus biopsied  Small sliding hiatal hernia  Mild erythema in gastric body could indicate mild gastritis or portal hypertensive gastropathy  No residual duodenal ulcer seen, but previous suture was seen     colonoscopy in 2019 with adequate preparation- Two sessile polyps measuring smaller than 5 mm in the ascending colon and sigmoid colon; performed complete removal by cold biopsy     PillCam in 2020 did not identify cause of anemia  HPI     Past Medical History:   Diagnosis Date    Alcohol abuse     Traore esophagus     Bowel obstruction (HCC)     Bowel perforation (HCC)     Cardiac disease     Continuous chronic [facility] ) 10/5/2017    COPD (chronic obstructive pulmonary disease) (HCC)     History of shoulder surgery     Right shoulder    History of transfusion     Hx of cervical spine surgery     Hypertension     Incisional hernia 10/8/2017    MI, old     Mitral regurgitation     Psychiatric disorder     Seizures ([facility] [address] [facility] )        Past Surgical History:   Procedure Laterality Date    APPENDECTOMY      BACK SURGERY      CHOLECYSTECTOMY      ESOPHAGOGASTRODUODENOSCOPY N/A 11/28/2016    Procedure: ESOPHAGOGASTRODUODENOSCOPY (EGD); Surgeon: Hallie Joseph MD;  Location:  GI LAB;   Service:     GALLBLADDER SURGERY      LAPAROTOMY N/A 10/25/2016    Procedure: LAPAROTOMY EXPLORATORY;  Surgeon: Tara Vasquez MD;  Location: [facility];  Service:     MOUTH SURGERY      PERCUTANEOUS PINNING FEMORAL NECK FRACTURE      SHOULDER SURGERY Right     SHOULDER SURGERY      SMALL INTESTINE SURGERY      STOMACH SURGERY      bal surgery       Current Outpatient Medications   Medication Sig Dispense Refill    albuterol (2 5 mg/3 mL) 0 083 % nebulizer solution Take 2 5 mg by nebulization every 6 (six) hours as needed for wheezing or shortness of breath      albuterol (PROVENTIL HFA,VENTOLIN HFA) 90 mcg/act inhaler Inhale 2 puffs every 4 (four) hours as needed for wheezing 18 each 3    amitriptyline (ELAVIL) 25 mg tablet Take 1 tablet (25 mg total) by mouth daily at bedtime as needed for sleep 30 tablet 3    Cholecalciferol 25 MCG (1000 UT) tablet Take 1 tablet (1,000 Units total) by mouth daily 30 tablet 0    cyanocobalamin (VITAMIN B-12) 100 mcg tablet Take 1 tablet (100 mcg total) by mouth daily 30 tablet 5    docusate sodium (COLACE) 100 mg capsule Take 1 capsule (100 mg total) by mouth 2 (two) times a day as needed for constipation 30 capsule 0    esomeprazole (NexIUM) 40 MG capsule Take 1 capsule (40 mg total) by mouth 2 (two) times a day before meals 60 capsule 3    folic acid (FOLVITE) 1 mg tablet Take 1 tablet (1 mg total) by mouth daily 30 tablet 0    furosemide (LASIX) 20 mg tablet Take 20 mg by mouth daily      gabapentin (NEURONTIN) 100 mg capsule Take 1 capsule (100 mg total) by mouth 3 (three) times a day 90 capsule 3    levETIRAcetam (KEPPRA) 1000 MG tablet Take 1 tablet (1,000 mg total) by mouth every 12 (twelve) hours 60 tablet 2    lisinopril (ZESTRIL) 10 mg tablet Take 1 tablet (10 mg total) by mouth daily 30 tablet 5    Multiple Vitamin (TAB-A-BONI PO) Take 1 tablet by mouth daily      pancrelipase, Lip-Prot-Amyl, (CREON) 6,000 units delayed release capsule Take 6,000 units of lipase by mouth 3 (three) times a day with meals 90 capsule 2    polyethylene glycol (GOLYTELY) 4000 mL solution Take 4,000 mL by mouth once for 1 dose 4000 mL 0    thiamine 100 MG tablet Take 1 tablet (100 mg total) by mouth daily 30 tablet 0     No current facility-administered medications for this visit  No Known Allergies    Review of Systems    Video Exam    There were no vitals filed for this visit  REVIEW OF SYSTEMS:    CONSTITUTIONAL: Denies any fever, chills, rigors, and weight loss  HEENT: No earache or tinnitus  Denies hearing loss or visual disturbances  CARDIOVASCULAR: No chest pain or palpitations  RESPIRATORY: Denies any cough, hemoptysis, shortness of breath or dyspnea on exertion    GASTROINTESTINAL: As noted in the History of Present Illness  GENITOURINARY: No problems with urination  Denies any hematuria or dysuria  NEUROLOGIC: No dizziness or vertigo, denies headaches  MUSCULOSKELETAL: Denies any muscle or joint pain  SKIN: Denies skin rashes or itching  ENDOCRINE: Denies excessive thirst  Denies intolerance to heat or cold  PSYCHOSOCIAL: Denies depression or anxiety  Denies any recent memory loss  PHYSICAL EXAMINATION:  Appearance and vitals taken from home devices    General Appearance:   Alert, cooperative, no distress   HEENT:  Normocephalic, atraumatic, anicteric  Neck supple, symmetrical, trachea midline  Lungs:   Equal chest rise and unlabored breathing, normal effort, no coughing  Cardiovascular:   No visualized JVD  Abdomen:   No abdominal distension  Skin:   No jaundice, rashes, or lesions  Musculoskeletal:   Normal range of motion visualized  Psych:  Normal affect and normal insight  Neuro:  Alert and appropriate  I spent 20 minutes directly with the patient during this visit      VIRTUAL VISIT DISCLAIMER    Magdalene Taylor acknowledges that he has consented to an online visit or consultation  He understands that the online visit is based solely on information provided by him, and that, in the absence of a face-to-face physical evaluation by the physician, the diagnosis he receives is both limited and provisional in terms of accuracy and completeness  This is not intended to replace a full medical face-to-face evaluation by the physician  Magdalene Taylor understands and accepts these terms

## 2021-03-16 ENCOUNTER — HOSPITAL ENCOUNTER (OUTPATIENT)
Dept: NON INVASIVE DIAGNOSTICS | Facility: HOSPITAL | Age: 56
Discharge: HOME | End: 2021-03-16
Attending: SURGERY
Payer: COMMERCIAL

## 2021-03-16 DIAGNOSIS — I77.1 CELIAC ARTERY STENOSIS (HCC): ICD-10-CM

## 2021-03-16 PROCEDURE — 93975 VASCULAR STUDY: CPT

## 2021-03-18 PROCEDURE — 93975 VASCULAR STUDY: CPT | Performed by: SURGERY

## 2021-03-22 ENCOUNTER — TELEPHONE (OUTPATIENT)
Dept: VASCULAR SURGERY | Facility: CLINIC | Age: 56
End: 2021-03-22

## 2021-03-22 ENCOUNTER — HOSPITAL ENCOUNTER (OUTPATIENT)
Dept: INFUSION CENTER | Facility: HOSPITAL | Age: 56
Discharge: HOME | End: 2021-03-22
Attending: INTERNAL MEDICINE
Payer: COMMERCIAL

## 2021-03-22 VITALS
RESPIRATION RATE: 16 BRPM | HEART RATE: 79 BPM | DIASTOLIC BLOOD PRESSURE: 78 MMHG | TEMPERATURE: 97.8 F | SYSTOLIC BLOOD PRESSURE: 128 MMHG

## 2021-03-22 DIAGNOSIS — D50.0 IRON DEFICIENCY ANEMIA DUE TO CHRONIC BLOOD LOSS: Primary | ICD-10-CM

## 2021-03-22 PROCEDURE — 96366 THER/PROPH/DIAG IV INF ADDON: CPT

## 2021-03-22 PROCEDURE — 96365 THER/PROPH/DIAG IV INF INIT: CPT

## 2021-03-22 RX ORDER — SODIUM CHLORIDE 9 MG/ML
20 INJECTION, SOLUTION INTRAVENOUS ONCE
Status: CANCELLED | OUTPATIENT
Start: 2021-03-29

## 2021-03-22 RX ORDER — SODIUM CHLORIDE 9 MG/ML
20 INJECTION, SOLUTION INTRAVENOUS ONCE
Status: COMPLETED | OUTPATIENT
Start: 2021-03-22 | End: 2021-03-22

## 2021-03-22 RX ADMIN — IRON SUCROSE 300 MG: 20 INJECTION, SOLUTION INTRAVENOUS at 13:45

## 2021-03-22 RX ADMIN — SODIUM CHLORIDE 20 ML/HR: 0.9 INJECTION, SOLUTION INTRAVENOUS at 13:33

## 2021-03-22 NOTE — TELEPHONE ENCOUNTER
EGD/COLON scheduled on 6/8/21 with Dr Brenner at [facility]  I gave pt verbal instructions/mailed  Prep-Golytely/Dulcolax

## 2021-03-22 NOTE — TELEPHONE ENCOUNTER
Message  Received: 3 days ago  [address]             Call patient for OV with Calogero Lorena    VAS celiac and/or mesenteric duplex; complete study  Status: Final result     Called pt to schedule - left message

## 2021-03-29 ENCOUNTER — HOSPITAL ENCOUNTER (OUTPATIENT)
Dept: INFUSION CENTER | Facility: HOSPITAL | Age: 56
Discharge: HOME | End: 2021-03-29
Attending: INTERNAL MEDICINE
Payer: COMMERCIAL

## 2021-03-29 VITALS
HEART RATE: 83 BPM | OXYGEN SATURATION: 96 % | SYSTOLIC BLOOD PRESSURE: 130 MMHG | RESPIRATION RATE: 16 BRPM | TEMPERATURE: 95.5 F | DIASTOLIC BLOOD PRESSURE: 86 MMHG

## 2021-03-29 DIAGNOSIS — D50.0 IRON DEFICIENCY ANEMIA DUE TO CHRONIC BLOOD LOSS: Primary | ICD-10-CM

## 2021-03-29 PROCEDURE — 96365 THER/PROPH/DIAG IV INF INIT: CPT

## 2021-03-29 PROCEDURE — 96366 THER/PROPH/DIAG IV INF ADDON: CPT

## 2021-03-29 RX ORDER — SODIUM CHLORIDE 9 MG/ML
20 INJECTION, SOLUTION INTRAVENOUS ONCE
Status: COMPLETED | OUTPATIENT
Start: 2021-03-29 | End: 2021-03-29

## 2021-03-29 RX ORDER — SODIUM CHLORIDE 9 MG/ML
20 INJECTION, SOLUTION INTRAVENOUS ONCE
Status: CANCELLED | OUTPATIENT
Start: 2021-04-06

## 2021-03-29 RX ADMIN — SODIUM CHLORIDE 20 ML/HR: 0.9 INJECTION, SOLUTION INTRAVENOUS at 10:36

## 2021-03-29 RX ADMIN — IRON SUCROSE 300 MG: 20 INJECTION, SOLUTION INTRAVENOUS at 10:39

## 2021-03-29 NOTE — PLAN OF CARE
Problem: INFECTION - ADULT  Goal: Absence or prevention of progression during hospitalization  Description: INTERVENTIONS:  - Assess and monitor for signs and symptoms of infection  - Monitor lab/diagnostic results  - Monitor all insertion sites, i e  indwelling lines, tubes, and drains  - Monitor endotracheal if appropriate and nasal secretions for changes in amount and color  - [place] appropriate cooling/warming therapies per order  - Administer medications as ordered  - Instruct and encourage patient and family to use good hand hygiene technique  - Identify and instruct in appropriate isolation precautions for identified infection/condition  Outcome: Progressing  Goal: Absence of fever/infection during neutropenic period  Description: INTERVENTIONS:  - Monitor WBC    Outcome: Progressing     Problem: DISCHARGE PLANNING  Goal: Discharge to home or other facility with appropriate resources  Description: INTERVENTIONS:  - Identify barriers to discharge w/patient and caregiver  - Arrange for needed discharge resources and transportation as appropriate  - Identify discharge learning needs (meds, wound care, etc )  - Arrange for interpretive services to assist at discharge as needed  - Refer to Case Management Department for coordinating discharge planning if the patient needs post-hospital services based on physician/advanced practitioner order or complex needs related to functional status, cognitive ability, or social support system  Outcome: Progressing     Problem: Knowledge Deficit  Goal: Patient/family/caregiver demonstrates understanding of disease process, treatment plan, medications, and discharge instructions  Description: Complete learning assessment and assess knowledge base    Interventions:  - Provide teaching at level of understanding  - Provide teaching via preferred learning methods  Outcome: Progressing

## 2021-04-06 ENCOUNTER — HOSPITAL ENCOUNTER (OUTPATIENT)
Dept: INFUSION CENTER | Facility: HOSPITAL | Age: 56
Discharge: HOME | End: 2021-04-06
Attending: INTERNAL MEDICINE
Payer: COMMERCIAL

## 2021-04-06 VITALS
OXYGEN SATURATION: 97 % | SYSTOLIC BLOOD PRESSURE: 160 MMHG | DIASTOLIC BLOOD PRESSURE: 92 MMHG | RESPIRATION RATE: 16 BRPM | TEMPERATURE: 96 F | HEART RATE: 80 BPM

## 2021-04-06 DIAGNOSIS — D50.0 IRON DEFICIENCY ANEMIA DUE TO CHRONIC BLOOD LOSS: Primary | ICD-10-CM

## 2021-04-06 PROCEDURE — 96365 THER/PROPH/DIAG IV INF INIT: CPT

## 2021-04-06 RX ORDER — SODIUM CHLORIDE 9 MG/ML
20 INJECTION, SOLUTION INTRAVENOUS ONCE
Status: COMPLETED | OUTPATIENT
Start: 2021-04-06 | End: 2021-04-06

## 2021-04-06 RX ORDER — SODIUM CHLORIDE 9 MG/ML
20 INJECTION, SOLUTION INTRAVENOUS ONCE
Status: CANCELLED | OUTPATIENT
Start: 2021-04-12

## 2021-04-06 RX ADMIN — IRON SUCROSE 300 MG: 20 INJECTION, SOLUTION INTRAVENOUS at 08:04

## 2021-04-06 RX ADMIN — SODIUM CHLORIDE 20 ML/HR: 0.9 INJECTION, SOLUTION INTRAVENOUS at 08:03

## 2021-04-06 NOTE — PLAN OF CARE
Problem: INFECTION - ADULT  Goal: Absence or prevention of progression during hospitalization  Description: INTERVENTIONS:  - Assess and monitor for signs and symptoms of infection  - Monitor lab/diagnostic results  - Monitor all insertion sites, i e  indwelling lines, tubes, and drains  - Monitor endotracheal if appropriate and nasal secretions for changes in amount and color  - [place] appropriate cooling/warming therapies per order  - Administer medications as ordered  - Instruct and encourage patient and family to use good hand hygiene technique  - Identify and instruct in appropriate isolation precautions for identified infection/condition  Outcome: Progressing     Problem: Knowledge Deficit  Goal: Patient/family/caregiver demonstrates understanding of disease process, treatment plan, medications, and discharge instructions  Description: Complete learning assessment and assess knowledge base    Interventions:  - Provide teaching at level of understanding  - Provide teaching via preferred learning methods  Outcome: Progressing

## 2021-05-11 ENCOUNTER — TELEMEDICINE (OUTPATIENT)
Dept: FAMILY MEDICINE CLINIC | Facility: CLINIC | Age: 56
End: 2021-05-11
Payer: COMMERCIAL

## 2021-05-11 DIAGNOSIS — J06.9 VIRAL URI WITH COUGH: Primary | ICD-10-CM

## 2021-05-11 DIAGNOSIS — J44.1 CHRONIC OBSTRUCTIVE PULMONARY DISEASE WITH ACUTE EXACERBATION (HCC): ICD-10-CM

## 2021-05-11 PROCEDURE — G0071 COMM SVCS BY RHC/FQHC 5 MIN: HCPCS | Performed by: FAMILY MEDICINE

## 2021-05-11 RX ORDER — ALBUTEROL SULFATE 90 UG/1
2 AEROSOL, METERED RESPIRATORY (INHALATION) EVERY 4 HOURS PRN
Qty: 18 G | Refills: 2 | Status: SHIPPED | OUTPATIENT
Start: 2021-05-11 | End: 2022-04-22 | Stop reason: SDUPTHER

## 2021-05-11 RX ORDER — AZITHROMYCIN 250 MG/1
TABLET, FILM COATED ORAL
Qty: 6 TABLET | Refills: 0 | Status: SHIPPED | OUTPATIENT
Start: 2021-05-11 | End: 2021-05-15

## 2021-05-11 NOTE — PROGRESS NOTES
Virtual Brief Visit    Assessment/Plan:    Problem List Items Addressed This Visit        Respiratory    COPD (chronic obstructive pulmonary disease) (HCC)    Relevant Medications    albuterol (PROVENTIL HFA,VENTOLIN HFA) 90 mcg/act inhaler    azithromycin (ZITHROMAX) 250 mg tablet    dextromethorphan-guaifenesin (MUCINEX DM)  MG per 12 hr tablet      Other Visit Diagnoses     Viral URI with cough    -  Primary    Relevant Medications    dextromethorphan-guaifenesin (MUCINEX DM)  MG per 12 hr tablet        - Discussed that symptoms are likely viral at this time, however, due to underlying COPD, will treat with a zpak  Mucinex PRN for symptomatic relief  Albuterol refilled for PRN use  Advised to call the office if symptoms persist or worsen  Reason for visit is   Chief Complaint   Patient presents with    Sinusitis    Virtual Brief Visit        Encounter provider Isamar Riley PA-C    Provider located at [address] [place] [address]    Recent Visits  No visits were found meeting these conditions  Showing recent visits within past 7 days and meeting all other requirements     Today's Visits  Date Type Provider Dept   05/11/21 Telemedicine JJ Singh   Showing today's visits and meeting all other requirements     Future Appointments  No visits were found meeting these conditions  Showing future appointments within next 150 days and meeting all other requirements        After connecting through telephone, the patient was identified by name and date of birth  Latasha Long was informed that this is a telemedicine visit and that the visit is being conducted through telephone  My office door was closed  No one else was in the room  He acknowledged consent and understanding of privacy and security of the platform   The patient has agreed to participate and understands he can discontinue the visit at any time  It was my intent to perform this visit via video technology but the patient was not able to do a video connection so the visit was completed via audio telephone only  Patient is aware this is a billable service  Subjective    Joseph Rivas is a 48 y o  male  Reza Garcia is a 48year old male with history of COPD, presenting with URI symptoms  Patient endorses 2 days of nasal congestion, rhinorrhea, chest tightness, and cough productive of phlegm  He has been taking an OTC sinus medication without relief  He has not been using his albuterol inhaler  Denies fever, chills, ear pain, sore throat, wheezing, SOB, abdominal pain, N/V  Endorses one episode of diarrhea  Continues to smoke 1/2 ppd  Reports multiple people in his building have been sick recently with similar symptoms  Denies COVID contacts  Past Medical History:   Diagnosis Date    Alcohol abuse     Traore esophagus     Bowel obstruction (HCC)     Bowel perforation (HCC)     Cardiac disease     Continuous chronic [facility] ) 10/5/2017    COPD (chronic obstructive pulmonary disease) (HCC)     History of shoulder surgery     Right shoulder    History of transfusion     Hx of cervical spine surgery     Hypertension     Incisional hernia 10/8/2017    MI, old     Mitral regurgitation     Psychiatric disorder     Seizures ([facility] [address] )        Past Surgical History:   Procedure Laterality Date    APPENDECTOMY      BACK SURGERY      CHOLECYSTECTOMY      ESOPHAGOGASTRODUODENOSCOPY N/A 11/28/2016    Procedure: ESOPHAGOGASTRODUODENOSCOPY (EGD); Surgeon: Dimitry Cid MD;  Location:  GI LAB;   Service:     GALLBLADDER SURGERY      LAPAROTOMY N/A 10/25/2016    Procedure: LAPAROTOMY EXPLORATORY;  Surgeon: Margaret Casper MD;  Location: [facility];  Service:    Renato Salazar MOUTH SURGERY      PERCUTANEOUS PINNING FEMORAL NECK FRACTURE      SHOULDER SURGERY Right     SHOULDER SURGERY      SMALL INTESTINE SURGERY      STOMACH SURGERY      bal surgery       Current Outpatient Medications   Medication Sig Dispense Refill    albuterol (PROVENTIL HFA,VENTOLIN HFA) 90 mcg/act inhaler Inhale 2 puffs every 4 (four) hours as needed for wheezing or shortness of breath 18 g 2    amitriptyline (ELAVIL) 25 mg tablet Take 1 tablet (25 mg total) by mouth daily at bedtime as needed for sleep 30 tablet 3    Cholecalciferol 25 MCG (1000 UT) tablet Take 1 tablet (1,000 Units total) by mouth daily 30 tablet 0    cyanocobalamin (VITAMIN B-12) 100 mcg tablet Take 1 tablet (100 mcg total) by mouth daily 30 tablet 5    docusate sodium (COLACE) 100 mg capsule Take 1 capsule (100 mg total) by mouth 2 (two) times a day as needed for constipation 30 capsule 0    esomeprazole (NexIUM) 40 MG capsule Take 1 capsule (40 mg total) by mouth 2 (two) times a day before meals 60 capsule 3    folic acid (FOLVITE) 1 mg tablet Take 1 tablet (1 mg total) by mouth daily 30 tablet 0    furosemide (LASIX) 20 mg tablet Take 20 mg by mouth daily      gabapentin (NEURONTIN) 100 mg capsule Take 1 capsule (100 mg total) by mouth 3 (three) times a day 90 capsule 3    levETIRAcetam (KEPPRA) 1000 MG tablet Take 1 tablet (1,000 mg total) by mouth every 12 (twelve) hours 60 tablet 2    lisinopril (ZESTRIL) 10 mg tablet Take 1 tablet (10 mg total) by mouth daily 30 tablet 5    Multiple Vitamin (TAB-A-BONI PO) Take 1 tablet by mouth daily      pancrelipase, Lip-Prot-Amyl, (CREON) 6,000 units delayed release capsule Take 6,000 units of lipase by mouth 3 (three) times a day with meals 90 capsule 2    thiamine 100 MG tablet Take 1 tablet (100 mg total) by mouth daily 30 tablet 0    albuterol (2 5 mg/3 mL) 0 083 % nebulizer solution Take 2 5 mg by nebulization every 6 (six) hours as needed for wheezing or shortness of breath      azithromycin (ZITHROMAX) 250 mg tablet Take 2 tablets today then 1 tablet daily x 4 days 6 tablet 0    dextromethorphan-guaifenesin (MUCINEX DM)  MG per 12 hr tablet Take 1 tablet by mouth every 12 (twelve) hours 14 tablet 0    polyethylene glycol (GOLYTELY) 4000 mL solution Take 4,000 mL by mouth once for 1 dose 4000 mL 0     No current facility-administered medications for this visit  No Known Allergies    Review of Systems   Constitutional: Negative for chills, diaphoresis and fever  HENT: Positive for congestion and rhinorrhea  Negative for ear pain, postnasal drip, sinus pressure, sinus pain and sore throat  Respiratory: Positive for cough and chest tightness  Negative for shortness of breath and wheezing  Gastrointestinal: Positive for diarrhea  Negative for abdominal pain, blood in stool, constipation, nausea and vomiting  Skin: Negative for rash and wound  Neurological: Negative for dizziness, syncope, weakness, light-headedness and headaches  There were no vitals filed for this visit  I spent 10 minutes directly with the patient during this visit    VIRTUAL VISIT DISCLAIMER    Lexie Coleman acknowledges that he has consented to an online visit or consultation  He understands that the online visit is based solely on information provided by him, and that, in the absence of a face-to-face physical evaluation by the physician, the diagnosis he receives is both limited and provisional in terms of accuracy and completeness  This is not intended to replace a full medical face-to-face evaluation by the physician  Lexie Coleman understands and accepts these terms

## 2021-05-28 ENCOUNTER — TELEPHONE (OUTPATIENT)
Dept: GASTROENTEROLOGY | Facility: CLINIC | Age: 56
End: 2021-05-28

## 2021-05-28 NOTE — TELEPHONE ENCOUNTER
Patient contacted the office to clarify prep for procedure  He is stopping by office for Miralax/Dulcolax directions and proecedure packet as he did not  his prep previously

## 2021-06-01 ENCOUNTER — TELEPHONE (OUTPATIENT)
Dept: SURGERY | Facility: CLINIC | Age: 56
End: 2021-06-01

## 2021-06-01 NOTE — TELEPHONE ENCOUNTER
Spoke with patient wants to cancel procedures at this time until he received packet in the mail  Pt will call back to r/s wants he receives   He also did not have ride for next week/

## 2021-06-08 DIAGNOSIS — E61.1 IRON DEFICIENCY: Primary | ICD-10-CM

## 2021-08-30 NOTE — ASSESSMENT & PLAN NOTE
CT scan of the abdomen/pelvis (08/20/2020):     · SPLEEN:  Calcified granulomata are noted in the spleen  No suspicious splenic mass    Outpatient follow-up with PCP in regards to this matter What Is The Reason For Today's Visit?: the risk of recurrence, and the development of new lesions

## 2021-09-29 ENCOUNTER — TELEPHONE (OUTPATIENT)
Dept: GASTROENTEROLOGY | Facility: CLINIC | Age: 56
End: 2021-09-29

## 2021-09-29 NOTE — TELEPHONE ENCOUNTER
The patient's address is:  [address] [place] [address] [address], [address], [address]    He also stated he does not know why he is getting a procedure packet  Please advise

## 2021-09-29 NOTE — TELEPHONE ENCOUNTER
Called the patient to see if the patient moved because his procedure packet came back to the office  LMOM to call the office back

## 2021-10-30 ENCOUNTER — APPOINTMENT (EMERGENCY)
Dept: CT IMAGING | Facility: HOSPITAL | Age: 56
End: 2021-10-30
Payer: COMMERCIAL

## 2021-10-30 ENCOUNTER — APPOINTMENT (EMERGENCY)
Dept: RADIOLOGY | Facility: HOSPITAL | Age: 56
End: 2021-10-30
Payer: COMMERCIAL

## 2021-10-30 ENCOUNTER — HOSPITAL ENCOUNTER (EMERGENCY)
Facility: HOSPITAL | Age: 56
Discharge: HOME | End: 2021-10-30
Attending: EMERGENCY MEDICINE
Payer: COMMERCIAL

## 2021-10-30 VITALS
SYSTOLIC BLOOD PRESSURE: 164 MMHG | RESPIRATION RATE: 18 BRPM | OXYGEN SATURATION: 98 % | HEIGHT: 66 IN | BODY MASS INDEX: 19.27 KG/M2 | WEIGHT: 119.93 LBS | HEART RATE: 105 BPM | DIASTOLIC BLOOD PRESSURE: 95 MMHG | TEMPERATURE: 97.8 F

## 2021-10-30 DIAGNOSIS — M79.10 MYALGIA: ICD-10-CM
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
## 2021-10-30 DIAGNOSIS — D64.9 ANEMIA: Primary | ICD-10-CM

## 2021-10-30 LAB
ABO GROUP BLD: NORMAL
ALBUMIN SERPL BCP-MCNC: 3.4 G/DL (ref 3.5–5)
ALP SERPL-CCNC: 133 U/L (ref 46–116)
ALT SERPL W P-5'-P-CCNC: 34 U/L (ref 12–78)
ANION GAP SERPL CALCULATED.3IONS-SCNC: 13 MMOL/L (ref 4–13)
AST SERPL W P-5'-P-CCNC: 83 U/L (ref 5–45)
BASOPHILS # BLD AUTO: 0.04 THOUSANDS/ΜL (ref 0–0.1)
BASOPHILS NFR BLD AUTO: 1 % (ref 0–1)
BILIRUB SERPL-MCNC: 0.33 MG/DL (ref 0.2–1)
BLD GP AB SCN SERPL QL: NEGATIVE
BUN SERPL-MCNC: 6 MG/DL (ref 5–25)
CALCIUM ALBUM COR SERPL-MCNC: 8.8 MG/DL (ref 8.3–10.1)
CALCIUM SERPL-MCNC: 8.3 MG/DL (ref 8.3–10.1)
CHLORIDE SERPL-SCNC: 89 MMOL/L (ref 100–108)
CO2 SERPL-SCNC: 26 MMOL/L (ref 21–32)
CREAT SERPL-MCNC: 0.55 MG/DL (ref 0.6–1.3)
EOSINOPHIL # BLD AUTO: 0.03 THOUSAND/ΜL (ref 0–0.61)
EOSINOPHIL NFR BLD AUTO: 1 % (ref 0–6)
ERYTHROCYTE [DISTWIDTH] IN BLOOD BY AUTOMATED COUNT: 21.1 % (ref 11.6–15.1)
GFR SERPL CREATININE-BSD FRML MDRD: 117 ML/MIN/1.73SQ M
GLUCOSE SERPL-MCNC: 92 MG/DL (ref 65–140)
HCT VFR BLD AUTO: 22.3 % (ref 36.5–49.3)
HCT VFR BLD AUTO: 22.8 % (ref 36.5–49.3)
HCT VFR BLD AUTO: 30.1 % (ref 36.5–49.3)
HGB BLD-MCNC: 6.2 G/DL (ref 12–17)
HGB BLD-MCNC: 6.4 G/DL (ref 12–17)
HGB BLD-MCNC: 8.7 G/DL (ref 12–17)
IMM GRANULOCYTES # BLD AUTO: 0.02 THOUSAND/UL (ref 0–0.2)
IMM GRANULOCYTES NFR BLD AUTO: 0 % (ref 0–2)
LYMPHOCYTES # BLD AUTO: 1.04 THOUSANDS/ΜL (ref 0.6–4.47)
LYMPHOCYTES NFR BLD AUTO: 21 % (ref 14–44)
MCH RBC QN AUTO: 17.8 PG (ref 26.8–34.3)
MCHC RBC AUTO-ENTMCNC: 27.8 G/DL (ref 31.4–37.4)
MCV RBC AUTO: 64 FL (ref 82–98)
MONOCYTES # BLD AUTO: 0.7 THOUSAND/ΜL (ref 0.17–1.22)
MONOCYTES NFR BLD AUTO: 14 % (ref 4–12)
NEUTROPHILS # BLD AUTO: 3.03 THOUSANDS/ΜL (ref 1.85–7.62)
NEUTS SEG NFR BLD AUTO: 63 % (ref 43–75)
NRBC BLD AUTO-RTO: 0 /100 WBCS
PLATELET # BLD AUTO: 175 THOUSANDS/UL (ref 149–390)
PMV BLD AUTO: 8.8 FL (ref 8.9–12.7)
POTASSIUM SERPL-SCNC: 3.2 MMOL/L (ref 3.5–5.3)
PROT SERPL-MCNC: 7.5 G/DL (ref 6.4–8.2)
RBC # BLD AUTO: 3.48 MILLION/UL (ref 3.88–5.62)
RH BLD: POSITIVE
SODIUM SERPL-SCNC: 128 MMOL/L (ref 136–145)
SPECIMEN EXPIRATION DATE: NORMAL
WBC # BLD AUTO: 4.86 THOUSAND/UL (ref 4.31–10.16)

## 2021-10-30 PROCEDURE — P9016 RBC LEUKOCYTES REDUCED: HCPCS

## 2021-10-30 PROCEDURE — 86850 RBC ANTIBODY SCREEN: CPT | Performed by: EMERGENCY MEDICINE

## 2021-10-30 PROCEDURE — 80053 COMPREHEN METABOLIC PANEL: CPT | Performed by: EMERGENCY MEDICINE

## 2021-10-30 PROCEDURE — 96376 TX/PRO/DX INJ SAME DRUG ADON: CPT

## 2021-10-30 PROCEDURE — 86900 BLOOD TYPING SEROLOGIC ABO: CPT | Performed by: EMERGENCY MEDICINE

## 2021-10-30 PROCEDURE — 99284 EMERGENCY DEPT VISIT MOD MDM: CPT

## 2021-10-30 PROCEDURE — 86901 BLOOD TYPING SEROLOGIC RH(D): CPT | Performed by: EMERGENCY MEDICINE

## 2021-10-30 PROCEDURE — 96374 THER/PROPH/DIAG INJ IV PUSH: CPT

## 2021-10-30 PROCEDURE — 72125 CT NECK SPINE W/O DYE: CPT

## 2021-10-30 PROCEDURE — 73030 X-RAY EXAM OF SHOULDER: CPT

## 2021-10-30 PROCEDURE — 85014 HEMATOCRIT: CPT | Performed by: EMERGENCY MEDICINE

## 2021-10-30 PROCEDURE — 36415 COLL VENOUS BLD VENIPUNCTURE: CPT | Performed by: EMERGENCY MEDICINE

## 2021-10-30 PROCEDURE — 73110 X-RAY EXAM OF WRIST: CPT

## 2021-10-30 PROCEDURE — 71260 CT THORAX DX C+: CPT

## 2021-10-30 PROCEDURE — 99284 EMERGENCY DEPT VISIT MOD MDM: CPT | Performed by: EMERGENCY MEDICINE

## 2021-10-30 PROCEDURE — 85025 COMPLETE CBC W/AUTO DIFF WBC: CPT | Performed by: EMERGENCY MEDICINE

## 2021-10-30 PROCEDURE — 85018 HEMOGLOBIN: CPT | Performed by: EMERGENCY MEDICINE

## 2021-10-30 PROCEDURE — 86923 COMPATIBILITY TEST ELECTRIC: CPT

## 2021-10-30 PROCEDURE — 36430 TRANSFUSION BLD/BLD COMPNT: CPT

## 2021-10-30 PROCEDURE — 74177 CT ABD & PELVIS W/CONTRAST: CPT

## 2021-10-30 RX ORDER — HYDROMORPHONE HCL/PF 1 MG/ML
0.5 SYRINGE (ML) INJECTION ONCE
Status: COMPLETED | OUTPATIENT
Start: 2021-10-30 | End: 2021-10-30

## 2021-10-30 RX ADMIN — HYDROMORPHONE HYDROCHLORIDE 0.5 MG: 1 INJECTION, SOLUTION INTRAMUSCULAR; INTRAVENOUS; SUBCUTANEOUS at 10:07

## 2021-10-30 RX ADMIN — HYDROMORPHONE HYDROCHLORIDE 0.5 MG: 1 INJECTION, SOLUTION INTRAMUSCULAR; INTRAVENOUS; SUBCUTANEOUS at 08:35

## 2021-10-30 RX ADMIN — HYDROMORPHONE HYDROCHLORIDE 0.5 MG: 1 INJECTION, SOLUTION INTRAMUSCULAR; INTRAVENOUS; SUBCUTANEOUS at 13:21

## 2021-10-30 RX ADMIN — IOHEXOL 100 ML: 350 INJECTION, SOLUTION INTRAVENOUS at 09:27

## 2021-10-31 LAB
ABO GROUP BLD BPU: NORMAL
BPU ID: NORMAL
CROSSMATCH: NORMAL
UNIT DISPENSE STATUS: NORMAL
UNIT PRODUCT CODE: NORMAL
UNIT PRODUCT VOLUME: 350 ML
UNIT RH: NORMAL

## 2021-11-01 ENCOUNTER — TELEPHONE (OUTPATIENT)
Dept: FAMILY MEDICINE CLINIC | Facility: CLINIC | Age: 56
End: 2021-11-01

## 2022-01-25 PROBLEM — F17.200 CURRENT EVERY DAY SMOKER: Status: ACTIVE | Noted: 2021-12-24

## 2022-01-25 PROBLEM — R26.2 AMBULATORY DYSFUNCTION: Status: ACTIVE | Noted: 2021-12-24

## 2022-01-25 PROBLEM — Z87.19 HX OF DUODENAL ULCER: Status: ACTIVE | Noted: 2021-12-24

## 2022-01-25 PROBLEM — M54.2 NECK PAIN: Status: ACTIVE | Noted: 2021-12-24
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
## 2022-04-15 DIAGNOSIS — Z23 NEED FOR SHINGLES VACCINE: Primary | ICD-10-CM

## 2022-04-15 RX ORDER — ZOSTER VACCINE RECOMBINANT, ADJUVANTED 50 MCG/0.5
0.5 KIT INTRAMUSCULAR ONCE
Qty: 1 EACH | Refills: 1 | Status: SHIPPED | OUTPATIENT
Start: 2022-04-15 | End: 2022-04-15

## 2022-04-22 ENCOUNTER — OFFICE VISIT (OUTPATIENT)
Dept: FAMILY MEDICINE CLINIC | Facility: CLINIC | Age: 57
End: 2022-04-22
Payer: COMMERCIAL

## 2022-04-22 VITALS
DIASTOLIC BLOOD PRESSURE: 74 MMHG | HEART RATE: 82 BPM | BODY MASS INDEX: 19.51 KG/M2 | WEIGHT: 121.4 LBS | SYSTOLIC BLOOD PRESSURE: 128 MMHG | HEIGHT: 66 IN | RESPIRATION RATE: 20 BRPM | OXYGEN SATURATION: 97 % | TEMPERATURE: 97.8 F

## 2022-04-22 DIAGNOSIS — Z00.00 ANNUAL PHYSICAL EXAM: Primary | ICD-10-CM

## 2022-04-22 DIAGNOSIS — R26.2 AMBULATORY DYSFUNCTION: ICD-10-CM

## 2022-04-22 DIAGNOSIS — M50.10 CERVICAL DISC SYNDROME: ICD-10-CM

## 2022-04-22 DIAGNOSIS — Z13.220 SCREENING CHOLESTEROL LEVEL: ICD-10-CM

## 2022-04-22 DIAGNOSIS — E55.9 VITAMIN D DEFICIENCY: ICD-10-CM

## 2022-04-22 DIAGNOSIS — J44.1 CHRONIC OBSTRUCTIVE PULMONARY DISEASE WITH ACUTE EXACERBATION (HCC): ICD-10-CM

## 2022-04-22 DIAGNOSIS — G40.909 SEIZURE DISORDER (HCC): ICD-10-CM

## 2022-04-22 DIAGNOSIS — E61.1 IRON DEFICIENCY: ICD-10-CM

## 2022-04-22 DIAGNOSIS — I10 ESSENTIAL HYPERTENSION: ICD-10-CM

## 2022-04-22 PROCEDURE — T1015 CLINIC SERVICE: HCPCS | Performed by: FAMILY MEDICINE

## 2022-04-22 RX ORDER — ALBUTEROL SULFATE 90 UG/1
2 AEROSOL, METERED RESPIRATORY (INHALATION) EVERY 4 HOURS PRN
Qty: 18 G | Refills: 2 | Status: SHIPPED | OUTPATIENT
Start: 2022-04-22

## 2022-04-22 RX ORDER — LEVETIRACETAM 1000 MG/1
1000 TABLET ORAL EVERY 12 HOURS
Qty: 60 TABLET | Refills: 2 | Status: SHIPPED | OUTPATIENT
Start: 2022-04-22

## 2022-04-22 NOTE — PATIENT INSTRUCTIONS

## 2022-04-22 NOTE — PROGRESS NOTES
Physical Therapy Daily Treatment     Visit Count: 4  Plan of Care Dates: Initial: 9/11/2018 Through: 12/4/2018  Insurance Information: BCBS  Next Referring Provider Visit: 9/24/18     Referred by: Sridhar Hagen DO  Medical Diagnosis (from order): Chronic pain of both knees [M25.561, M25.562, G89.29]  - Primary   Treatment Diagnosis: Knee Symptoms with Pain, Impaired Joint Mobility, Impaired Range of Motion, Impaired Motor Function/Muscle Performance and Impaired Gait/Locomotion Deficits  Insurance: 1. ANTH/[facility]  2. N/A     Date of Surgery: 8/16/18; Surgery performed: Arthroscopic partial medial meniscectomy, right; Physician Guidelines no  Diagnosis Precautions: none  Chart reviewed: Relevant co-morbidities, allergies, tests and medications: see chart     SUBJECTIVE   She states she had sharp pain in left knee over the weekend. Patient was vacuuming and she had to stop due to severe left knee, she used ice and pain management medications to decrease pain. She reports increased activity this weekend which may have contributed to her high level of pain. So far this morning her pain level is down. MD appt yesterday to which she states he will be ordering an MRI soon. Per her report the pain is different compared to what her right knee was.    Current Pain: 3-4/10 on left knee, 0/10 on right knee  Functional Change: Inability to vacuum home without rest breaks    OBJECTIVE   No objective assessment measures taken today.    Treatment   Began today's session with use of upright bike x 10 minutes.    Therapeutic Exercise:   Exercise 9/11/18 9/19/18 9/21/18 9/25/18 Position/Cues   Quad sets 2 x 10 1 x 10      SLR in neutral and ER 2 x 10 each 1 x 10 each      Seated HS stretch 3 x 30 seconds       Bridges  2 x 10 2 x 10      S/L hip ABD  2 x 10 1 x 10      Passive knee extension and flexion stretches  3 x 30 seconds  3 x 30 seconds B    Mini squats   2 x 10 2 x 10    Heel raises   1 x 20 3# ankle weights 1 x 20  [address] [facility]    NAME: Milad Duggan  AGE: 55 y o  SEX: male  : 1965     DATE: 2022     Assessment and Plan:     Problem List Items Addressed This Visit        Respiratory    COPD (chronic obstructive pulmonary disease) ([address] )    Relevant Medications    albuterol (PROVENTIL HFA,VENTOLIN HFA) 90 mcg/act inhaler    Other Relevant Orders    Complete PFT with post bronchodilator       Cardiovascular and Mediastinum    Essential hypertension    Relevant Orders    Comprehensive metabolic panel    TSH, 3rd generation with Free T4 reflex       Nervous and Auditory    Seizures (HCC)    Relevant Medications    levETIRAcetam (KEPPRA) 1000 MG tablet    Other Relevant Orders    Ambulatory Referral to Neurology    Levetiracetam level       Other    Vitamin D deficiency    Relevant Orders    Vitamin D 25 hydroxy    Iron deficiency    Relevant Orders    Ambulatory Referral to Hematology / Oncology    Ambulatory dysfunction    Relevant Orders    Ambulatory Referral to Physical Therapy      Other Visit Diagnoses     Annual physical exam    -  Primary    Cervical disc syndrome        Relevant Medications    levETIRAcetam (KEPPRA) 1000 MG tablet    Screening cholesterol level        Relevant Orders    Lipid Panel with Direct LDL reflex          Immunizations and preventive care screenings were discussed with patient today  Appropriate education was printed on patient's after visit summary  Counseling:  Alcohol/drug use: discussed moderation in alcohol intake, the recommendations for healthy alcohol use, and avoidance of illicit drug use  Dental Health: discussed importance of regular tooth brushing, flossing, and dental visits  Injury prevention: discussed safety/seat belts, safety helmets, smoke detectors, carbon dioxide detectors, and smoking near bedding or upholstery    Sexual health: discussed sexually transmitted diseases, Verbal cue to not allow feet to roll to lateral side with raise   Supine 90/90 LLE nerve floss with active DF/PF    2 x 10 ankle pumps    Standing marches    3# ankle weights 1 x 20    Standing hip ABD    3# ankle weights 1 x 15 ea Manual cue to maintain form   Standing hip extension    3# ankle weights 1 x 15 ea Manual cue to maintain form   Lunge    1 x 5 each LE Cue to only perform CHCF secondary to pain           Comments: Increased difficulty and pain with lunge when left knee was in back position vs. front    Therapeutic Activity:  Continued to address ability to ambulate with improved overall gait pattern noted. Discussed recent MD visit and what findings may mean from an MRI.        Current Home Program (not performed this date except as noted above):   Exercise: Date issued Date DC Comments   Quad sets 9/11/18     SLR neutral and ER 9/11/18     Seated HS stretching 9/11/18     Bridges 9/19/18     S/L hip ABD 9/19/18         ASSESSMENT   Patient able tolerated today's session well with change in warm up as well as addition of standing hip/knee strengthening exercises. She reports increased pain with observed increased challenge with lunge and mini squat exercise. She demonstrates lack of form in lunge exercise with LLE in back position requiring manual cues to flex and extend both knees during exercise vs the front knee. No change to HEP at this time due to continued moderate difficulty with these exercises on LLE. Education and instruction provided on importance of breaking up tasks into multiple bouts to decrease pain. Continue with skilled PT to address remaining deficits including weakness, decreased ROM, and mild gait/mobility deficits.     Pain after treatment: left 2-3/10 , right continues to be at 0/10  Result of above outlined education: Verbalizes understanding and Demonstrates understanding    Goals:       To be obtained by end of this plan of care:  1. Patient independent with modified and  partner selection, use of condoms, avoidance of unintended pregnancy, and contraceptive alternatives  · Exercise: the importance of regular exercise/physical activity was discussed  Recommend exercise 3-5 times per week for at least 30 minutes  Return in about 4 weeks (around 5/20/2022) for Recheck PFTs and labs  Chief Complaint:     Chief Complaint   Patient presents with    Physical Exam    Medication Refill      History of Present Illness:     Adult Annual Physical   Patient here for a comprehensive physical exam  The patient reports problems - BL LE weakness  Diet and Physical Activity  · Diet/Nutrition: well balanced diet and consuming 3-5 servings of fruits/vegetables daily  · Exercise: walking, 1-2 times a week on average and less than 30 minutes on average  Depression Screening  PHQ-2/9 Depression Screening         General Health  · Sleep: sleeps well and gets 7-8 hours of sleep on average  · Hearing: normal - bilateral   · Vision: most recent eye exam >1 year ago and wears glasses  · Dental: Does not have teeth; lost dentures   Health  · Symptoms include: urinary urgency and nocturia     Review of Systems:     Review of Systems   Constitutional: Negative for activity change, appetite change, fatigue and unexpected weight change  HENT: Negative for congestion, ear pain, hearing loss, nosebleeds, rhinorrhea, sinus pain and trouble swallowing  Eyes: Negative for photophobia  Respiratory: Negative for choking, shortness of breath and wheezing  Cardiovascular: Negative for chest pain, palpitations and leg swelling  Gastrointestinal: Negative for abdominal pain, blood in stool, constipation, diarrhea and nausea  Endocrine: Negative for polydipsia, polyphagia and polyuria  Genitourinary: Negative for difficulty urinating, dysuria, frequency, hematuria and urgency  Musculoskeletal: Positive for arthralgias (legs) and gait problem   Negative for back pain and myalgias  Skin: Negative for color change, rash and wound  Neurological: Positive for weakness (BL LE)  Negative for dizziness, tremors, syncope and headaches  Psychiatric/Behavioral: Negative for agitation, confusion, self-injury and suicidal ideas  Past Medical History:     Past Medical History:   Diagnosis Date    Alcohol abuse     Traore esophagus     Bowel obstruction (HCC)     Bowel perforation (HCC)     Cardiac disease     Continuous chronic alcoholism ([facility] [address] ) 10/5/2017    COPD (chronic obstructive pulmonary disease) (HCC)     History of shoulder surgery     Right shoulder    History of transfusion     Hx of cervical spine surgery     Hypertension     Incisional hernia 10/8/2017    MI, old     Mitral regurgitation     Psychiatric disorder     Seizures ([facility] [address] )       Past Surgical History:     Past Surgical History:   Procedure Laterality Date    APPENDECTOMY      BACK SURGERY      CHOLECYSTECTOMY      ESOPHAGOGASTRODUODENOSCOPY N/A 11/28/2016    Procedure: ESOPHAGOGASTRODUODENOSCOPY (EGD); Surgeon: Alfred Lacey MD;  Location:  GI LAB;   Service:    [address]      LAPAROTOMY N/A 10/25/2016    Procedure: LAPAROTOMY EXPLORATORY;  Surgeon: Yris Villalpando MD;  Location: [facility];  Service:    Megan Slipper MOUTH SURGERY      PERCUTANEOUS PINNING FEMORAL NECK FRACTURE      SHOULDER SURGERY Right     SHOULDER SURGERY      SMALL INTESTINE SURGERY      STOMACH SURGERY      bal surgery      Family History:     Family History   Problem Relation Age of Onset    Breast cancer Mother     Prostate cancer Father     Skin cancer Brother       Social History:     Social History     Socioeconomic History    Marital status: Single     Spouse name: None    Number of children: None    Years of education: None    Highest education level: None   Occupational History    None   Tobacco Use    Smoking status: Current Every Day Smoker     Packs/day: 1 00     Years: 15 00     Pack progressed home exercise program.  2. Patient will decrease involved knee pain/symptoms to 1/10  to aid in community ambulation for activities of independent living, stair ambulation, completing transfers including low and soft surfaces, age appropriate activities, safely driving a car and sleeping undisturbed through a night.   3. Patient will increase involved knee active range of motion to WFL° to aid in stair ambulation, completing transfers including low and soft surfaces, completion of self-care tasks/dressing and age appropriate activities.   4. Patient will increase involved knee strength to within functional limits to aid in community ambulation for activities of independent living, normalization of gait for independent living, ambulating on level and unlevel surfaces, stair ambulation and age appropriate activities.     PLAN   Continue to progress as tolerated per plan of care, continue with WBing exercises, revisit lunge    THERAPY DAILY BILLING   Primary Insurance:  ANTHEM/BCBS  Secondary Insurance: N/A    Evaluation Procedures:  No evaluation codes were used on this date of service    Timed Procedures:  Therapeutic Activity, 5 minutes  Therapeutic Exercise, 38 minutes    Untimed Procedures:  No untimed codes were used on this date of service    Total Treatment Time: 43 minutes    Routine safety standards followed per [facility] system and site policies    years: 15 00     Types: Cigarettes    Smokeless tobacco: Never Used   Vaping Use    Vaping Use: Former   Substance and Sexual Activity    Alcohol use:  Yes     Alcohol/week: 6 0 standard drinks     Types: 6 Cans of beer per week     Comment: 4    Drug use: No    Sexual activity: Not Currently     Partners: Female   Other Topics Concern    None   Social History Narrative    None     Social Determinants of Health     Financial Resource Strain: Not on file   Food Insecurity: Not on file   Transportation Needs: Not on file   Physical Activity: Not on file   Stress: Not on file   Social Connections: Not on file   Intimate Partner Violence: Not on file   Housing Stability: Not on file      Current Medications:     Current Outpatient Medications   Medication Sig Dispense Refill    albuterol (2 5 mg/3 mL) 0 083 % nebulizer solution Take 2 5 mg by nebulization every 6 (six) hours as needed for wheezing or shortness of breath      albuterol (PROVENTIL HFA,VENTOLIN HFA) 90 mcg/act inhaler Inhale 2 puffs every 4 (four) hours as needed for wheezing or shortness of breath 18 g 2    amitriptyline (ELAVIL) 25 mg tablet Take 1 tablet (25 mg total) by mouth daily at bedtime as needed for sleep 30 tablet 3    Cholecalciferol 25 MCG (1000 UT) tablet Take 1 tablet (1,000 Units total) by mouth daily 30 tablet 0    cyanocobalamin (VITAMIN B-12) 100 mcg tablet Take 1 tablet (100 mcg total) by mouth daily 30 tablet 5    dextromethorphan-guaifenesin (MUCINEX DM)  MG per 12 hr tablet Take 1 tablet by mouth every 12 (twelve) hours 14 tablet 0    docusate sodium (COLACE) 100 mg capsule Take 1 capsule (100 mg total) by mouth 2 (two) times a day as needed for constipation 30 capsule 0    esomeprazole (NexIUM) 40 MG capsule Take 1 capsule (40 mg total) by mouth 2 (two) times a day before meals 60 capsule 3    folic acid (FOLVITE) 1 mg tablet Take 1 tablet (1 mg total) by mouth daily 30 tablet 0    furosemide (LASIX) 20 mg tablet Take 20 mg by mouth daily      gabapentin (NEURONTIN) 100 mg capsule Take 1 capsule (100 mg total) by mouth 3 (three) times a day 90 capsule 3    levETIRAcetam (KEPPRA) 1000 MG tablet Take 1 tablet (1,000 mg total) by mouth every 12 (twelve) hours 60 tablet 2    lisinopril (ZESTRIL) 10 mg tablet Take 1 tablet (10 mg total) by mouth daily 30 tablet 5    Multiple Vitamin (TAB-A-BONI PO) Take 1 tablet by mouth daily      pancrelipase, Lip-Prot-Amyl, (CREON) 6,000 units delayed release capsule Take 6,000 units of lipase by mouth 3 (three) times a day with meals 90 capsule 2    thiamine 100 MG tablet Take 1 tablet (100 mg total) by mouth daily 30 tablet 0    polyethylene glycol (GOLYTELY) 4000 mL solution Take 4,000 mL by mouth once for 1 dose 4000 mL 0     No current facility-administered medications for this visit  Allergies:     No Known Allergies   Physical Exam:     /74   Pulse 82   Temp 97 8 °F (36 6 °C) (Tympanic)   Resp 20   Ht 5' 6" (1 676 m)   Wt 55 1 kg (121 lb 6 4 oz)   SpO2 97%   BMI 19 59 kg/m²     Physical Exam  Vitals and nursing note reviewed  Constitutional:       Appearance: He is well-developed  HENT:      Head: Normocephalic and atraumatic  Eyes:      Conjunctiva/sclera: Conjunctivae normal    Cardiovascular:      Rate and Rhythm: Normal rate and regular rhythm  Heart sounds: No murmur heard  Pulmonary:      Effort: Pulmonary effort is normal  No respiratory distress  Breath sounds: Normal breath sounds  Abdominal:      Palpations: Abdomen is soft  Tenderness: There is no abdominal tenderness  Genitourinary:     Comments: Exam deferred  Musculoskeletal:      Comments: Presents with cane   Neurological:      Mental Status: He is alert and oriented to person, place, and time        Gait: Gait abnormal    Psychiatric:         Mood and Affect: Mood normal          Behavior: Behavior normal           LORA Diehl  [address] [facility] [address] [facility]

## 2022-04-26 ENCOUNTER — EVALUATION (OUTPATIENT)
Dept: PHYSICAL THERAPY | Facility: CLINIC | Age: 57
End: 2022-04-26
Payer: COMMERCIAL

## 2022-04-26 DIAGNOSIS — R26.2 AMBULATORY DYSFUNCTION: ICD-10-CM

## 2022-04-26 PROCEDURE — 97162 PT EVAL MOD COMPLEX 30 MIN: CPT | Performed by: PHYSICAL THERAPIST

## 2022-04-26 PROCEDURE — 97110 THERAPEUTIC EXERCISES: CPT | Performed by: PHYSICAL THERAPIST

## 2022-04-26 NOTE — PROGRESS NOTES
PT Evaluation     Today's date: 2022  Patient name: Porter Jorgensen  : 1965  MRN: [id number]  Referring provider: Sal García, *  Dx:   Encounter Diagnosis     ICD-10-CM    1  Ambulatory dysfunction  R26 2 Ambulatory Referral to Physical Therapy    P T  for evaluation  Cont using assistive devices                  Assessment  Assessment details: Pt presents to PT with significant ambulatory dysfunction  Steppage type gait with wide base of support  Pt notes neuropathy of b/l UE/LE, no sensation distally, and chronic pain  He has realistic goals of improving walking for grocery shopping, improving ease with transfers, and stairs  Objectively he has significant weakness b/l LE with left limited more than right  Poor balance as expected  Will benefit from skilled PT warranted to address findings and progress towards outlined goals    Impairments: abnormal gait, abnormal movement, impaired balance, impaired physical strength and pain with function  Understanding of Dx/Px/POC: good   Prognosis: fair    Goals  STG: 3-4 weeks  Independent with home program focused on basic leg strength  Complete sit-stand without use of UE  Improve static balance with shoulder width MCKENZIE to 1 minute with EO and 30 sec with EC    LT-8 weeks  Improve MMT in all planes of LE by one full numerical grade  NBOS static balance safely for 10 sec with eyes open and no hand on assistance  Complete flight of stairs with only one hand on railing and no assistance with safe and good form  Improve ease of walking with cart at grocery store  Understand progressions of HEP    Plan  Plan details: TE, NMR, TA, MT, self education, and modalities as needed in order to progress through skilled PT focused on  ROM, strength, balance, coordination   Patient would benefit from: skilled physical therapy  Planned modality interventions: cryotherapy and thermotherapy: hydrocollator packs  Planned therapy interventions: manual therapy, neuromuscular re-education, self care, therapeutic activities, therapeutic exercise and home exercise program  Frequency: 2x week  Duration in weeks: 8  Treatment plan discussed with: patient        Subjective Evaluation    History of Present Illness  Mechanism of injury: 55year old male presenting to PT with gait dysfunction  He has significant neuropathy of both hands and feet over last 20 years since breaking neck/back  He has difficulty feeling anything from knee down and notes legs are very weak  He is not working  General mobility is difficult  Also notes seizures of which he is on medication  Lives with brother  Has a few steps to enter the home but once in lives on main floor  Uses SPC for ambulation     Pain  Current pain ratin    Social Support  Steps to enter house: yes    Patient Goals  Patient goals for therapy: improved balance, increased motion, increased strength and independence with ADLs/IADLs  Patient goal: walk up stairs easier and with less concern of balance, increase ease with getting out of chair, increase ease with shopping         Objective     General Comments:      Lumbar Comments  Gait:  Wide MCKENZIE, almost steppage type gait pattern, uses of SPC    Transfers: requires use of UE and cane to complete sit-stand  Extra time to transfer on table but independent    Balance:   Shoulder width MCKENZIE completes without holding on but limited time frame and became unsteady (15 sec)  Narrow mckenzie - requires hand hold    TU sec with  SPC    MMT R/L  Hip flex: 4/3  Hip abd: 3+/3-  Knee ext: 3+/3  Knee flex: 3+/3  Ankle DF: 4+/4+  Ankle inv/ev: 4+/4             Precautions: balance and neuropathy      Manuals 4-26        Joint work         -hip         -knee         ST work         Flexibility/PROM                           Neuro Re-Ed         balance                           Step-ups fwd         Step-ups lat                  lunge         Ther Ex         NuStep/bike SLR program Flex x20        clamshells         bridges x20        Leg press         Hamstring strength         Standing hip abd x20        Standing hip flex x20        Quad strength laq 2x10                 Ther Activity         Stair negotiation                  Gait Training                           Modalities                           Provided written HEP for TE above, reviewed with pt

## 2022-04-27 ENCOUNTER — TELEPHONE (OUTPATIENT)
Dept: HEMATOLOGY ONCOLOGY | Facility: CLINIC | Age: 57
End: 2022-04-27

## 2022-04-27 NOTE — TELEPHONE ENCOUNTER
Scheduling Appointment     Who Is Calling to Schedule  patient   Doctor Dr Herminio Head   Location [facility], [place]   Date and Time 4/29/22 @ 2pm   Reason for scheduling appointment PCP wants him to go back on infusion treatments   Patient verbalized understanding    Yes

## 2022-04-28 ENCOUNTER — OFFICE VISIT (OUTPATIENT)
Dept: PHYSICAL THERAPY | Facility: CLINIC | Age: 57
End: 2022-04-28
Payer: COMMERCIAL

## 2022-04-28 ENCOUNTER — APPOINTMENT (OUTPATIENT)
Dept: LAB | Facility: MEDICAL CENTER | Age: 57
End: 2022-04-28
Payer: COMMERCIAL

## 2022-04-28 DIAGNOSIS — R26.2 AMBULATORY DYSFUNCTION: Primary | ICD-10-CM

## 2022-04-28 DIAGNOSIS — M79.89 SWELLING OF LIMB: ICD-10-CM

## 2022-04-28 DIAGNOSIS — G40.909 SEIZURE DISORDER (HCC): ICD-10-CM

## 2022-04-28 DIAGNOSIS — E55.9 VITAMIN D DEFICIENCY: ICD-10-CM

## 2022-04-28 DIAGNOSIS — I10 ESSENTIAL HYPERTENSION: ICD-10-CM

## 2022-04-28 DIAGNOSIS — E61.1 IRON DEFICIENCY: ICD-10-CM

## 2022-04-28 DIAGNOSIS — Z13.220 SCREENING CHOLESTEROL LEVEL: ICD-10-CM

## 2022-04-28 LAB
25(OH)D3 SERPL-MCNC: 11 NG/ML (ref 30–100)
ALBUMIN SERPL BCP-MCNC: 3.7 G/DL (ref 3.5–5)
ALP SERPL-CCNC: 170 U/L (ref 46–116)
ALT SERPL W P-5'-P-CCNC: 37 U/L (ref 12–78)
ANION GAP SERPL CALCULATED.3IONS-SCNC: 11 MMOL/L (ref 4–13)
AST SERPL W P-5'-P-CCNC: 128 U/L (ref 5–45)
BASOPHILS # BLD AUTO: 0.07 THOUSANDS/ΜL (ref 0–0.1)
BASOPHILS NFR BLD AUTO: 2 % (ref 0–1)
BILIRUB SERPL-MCNC: 0.69 MG/DL (ref 0.2–1)
BUN SERPL-MCNC: 4 MG/DL (ref 5–25)
CALCIUM SERPL-MCNC: 8.7 MG/DL (ref 8.3–10.1)
CHLORIDE SERPL-SCNC: 88 MMOL/L (ref 100–108)
CHOLEST SERPL-MCNC: 166 MG/DL
CO2 SERPL-SCNC: 25 MMOL/L (ref 21–32)
CREAT SERPL-MCNC: 0.67 MG/DL (ref 0.6–1.3)
EOSINOPHIL # BLD AUTO: 0.06 THOUSAND/ΜL (ref 0–0.61)
EOSINOPHIL NFR BLD AUTO: 1 % (ref 0–6)
ERYTHROCYTE [DISTWIDTH] IN BLOOD BY AUTOMATED COUNT: 21.1 % (ref 11.6–15.1)
FERRITIN SERPL-MCNC: 16 NG/ML (ref 8–388)
GFR SERPL CREATININE-BSD FRML MDRD: 108 ML/MIN/1.73SQ M
GLUCOSE P FAST SERPL-MCNC: 85 MG/DL (ref 65–99)
HCT VFR BLD AUTO: 26.5 % (ref 36.5–49.3)
HDLC SERPL-MCNC: 80 MG/DL
HGB BLD-MCNC: 7.7 G/DL (ref 12–17)
IMM GRANULOCYTES # BLD AUTO: 0.03 THOUSAND/UL (ref 0–0.2)
IMM GRANULOCYTES NFR BLD AUTO: 1 % (ref 0–2)
IRON SATN MFR SERPL: 3 % (ref 20–50)
IRON SERPL-MCNC: 14 UG/DL (ref 65–175)
LDLC SERPL CALC-MCNC: 70 MG/DL (ref 0–100)
LYMPHOCYTES # BLD AUTO: 1.57 THOUSANDS/ΜL (ref 0.6–4.47)
LYMPHOCYTES NFR BLD AUTO: 36 % (ref 14–44)
MCH RBC QN AUTO: 21.3 PG (ref 26.8–34.3)
MCHC RBC AUTO-ENTMCNC: 29.1 G/DL (ref 31.4–37.4)
MCV RBC AUTO: 73 FL (ref 82–98)
MONOCYTES # BLD AUTO: 0.44 THOUSAND/ΜL (ref 0.17–1.22)
MONOCYTES NFR BLD AUTO: 10 % (ref 4–12)
NEUTROPHILS # BLD AUTO: 2.14 THOUSANDS/ΜL (ref 1.85–7.62)
NEUTS SEG NFR BLD AUTO: 50 % (ref 43–75)
NRBC BLD AUTO-RTO: 1 /100 WBCS
PLATELET # BLD AUTO: 80 THOUSANDS/UL (ref 149–390)
PMV BLD AUTO: 10.3 FL (ref 8.9–12.7)
POTASSIUM SERPL-SCNC: 3.5 MMOL/L (ref 3.5–5.3)
PROT SERPL-MCNC: 8.3 G/DL (ref 6.4–8.2)
RBC # BLD AUTO: 3.62 MILLION/UL (ref 3.88–5.62)
SODIUM SERPL-SCNC: 124 MMOL/L (ref 136–145)
TIBC SERPL-MCNC: 506 UG/DL (ref 250–450)
TRIGL SERPL-MCNC: 79 MG/DL
TSH SERPL DL<=0.05 MIU/L-ACNC: 3.78 UIU/ML (ref 0.45–4.5)
WBC # BLD AUTO: 4.31 THOUSAND/UL (ref 4.31–10.16)

## 2022-04-28 PROCEDURE — 82728 ASSAY OF FERRITIN: CPT

## 2022-04-28 PROCEDURE — 85025 COMPLETE CBC W/AUTO DIFF WBC: CPT

## 2022-04-28 PROCEDURE — 80053 COMPREHEN METABOLIC PANEL: CPT

## 2022-04-28 PROCEDURE — 80061 LIPID PANEL: CPT

## 2022-04-28 PROCEDURE — 97110 THERAPEUTIC EXERCISES: CPT | Performed by: PHYSICAL THERAPIST

## 2022-04-28 PROCEDURE — 80177 DRUG SCRN QUAN LEVETIRACETAM: CPT

## 2022-04-28 PROCEDURE — 97140 MANUAL THERAPY 1/> REGIONS: CPT | Performed by: PHYSICAL THERAPIST

## 2022-04-28 PROCEDURE — 84443 ASSAY THYROID STIM HORMONE: CPT

## 2022-04-28 PROCEDURE — 36415 COLL VENOUS BLD VENIPUNCTURE: CPT

## 2022-04-28 PROCEDURE — 83550 IRON BINDING TEST: CPT

## 2022-04-28 PROCEDURE — 82306 VITAMIN D 25 HYDROXY: CPT

## 2022-04-28 PROCEDURE — 83540 ASSAY OF IRON: CPT

## 2022-04-28 NOTE — PROGRESS NOTES
Daily Note     Today's date: 2022  Patient name: Michelle Lainez  : 1965  MRN: [id number]  Referring provider: Duarte Garcia, *  Dx:   Encounter Diagnosis     ICD-10-CM    1  Ambulatory dysfunction  R26 2                   Subjective: status quo      Objective: See treatment diary below      Assessment: pt fatigued quickly with activity  No resistance utilized for any strength work today except hamstrings  Added hamstring stretching after strength work today         Plan: continue with basic strength, flexibility     Precautions: balance and neuropathy      Manuals 4-26 4-28       Joint work         -hip         -knee         ST work         Flexibility/PROM  hamstring and piriformis stretch x8'                         Neuro Re-Ed         balance  shoulder width static EO 3x20", NBOS EO 3x20"         tandem x10" x3 ea                Step-ups fwd         Step-ups lat                  lunge         Ther Ex         NuStep/bike  NS 10'                         SLR program Flex x20 SLR 2x10 ea       clamshells         bridges x20 x20       Leg press         Hamstring strength  otb 2x12       Standing hip abd x20 2x10 ea       Standing hip flex x20        Quad strength laq 2x10 saq x30 ea       squats  x8       Ther Activity         Stair negotiation                  Gait Training                           Modalities

## 2022-04-29 ENCOUNTER — OFFICE VISIT (OUTPATIENT)
Dept: HEMATOLOGY ONCOLOGY | Facility: CLINIC | Age: 57
End: 2022-04-29
Payer: COMMERCIAL

## 2022-04-29 ENCOUNTER — TELEPHONE (OUTPATIENT)
Dept: FAMILY MEDICINE CLINIC | Facility: CLINIC | Age: 57
End: 2022-04-29

## 2022-04-29 VITALS
HEART RATE: 109 BPM | SYSTOLIC BLOOD PRESSURE: 108 MMHG | DIASTOLIC BLOOD PRESSURE: 70 MMHG | TEMPERATURE: 98 F | BODY MASS INDEX: 19.61 KG/M2 | HEIGHT: 66 IN | WEIGHT: 122 LBS

## 2022-04-29 DIAGNOSIS — D50.0 IRON DEFICIENCY ANEMIA DUE TO CHRONIC BLOOD LOSS: Primary | ICD-10-CM

## 2022-04-29 PROCEDURE — 99214 OFFICE O/P EST MOD 30 MIN: CPT | Performed by: INTERNAL MEDICINE

## 2022-04-29 RX ORDER — SODIUM CHLORIDE 9 MG/ML
20 INJECTION, SOLUTION INTRAVENOUS ONCE
Status: CANCELLED | OUTPATIENT
Start: 2022-05-04

## 2022-04-29 NOTE — PROGRESS NOTES
[address] [facility] [place] [id number] [facility] [id number] [phone]  Mabel SEGUNDO,[birth year], [id number]  04/29/22    Discussion:   In summary, this is a 56-year-old male history of iron deficiency anemia  He has had GI workup including EGD, colonoscopy, capsule endoscopy over the past 2 years  Colon polyps and small-bowel xanthelasma was noted  No evident bleeding source  He continues with moderate/severe anemia  Iron saturation and ferritin both severely depressed consistent with iron deficiency anemia  He notes that he feels better after iron replacement  Iron replacement has been somewhat irregular occurring at intervals of 4-8 months  We will make arrangements for Venofer 300 mg IV x3 doses  Follow-up in 4 months  He is going to physical therapy to work on balance  He has chronic hyponatremia  He had seen Nephrology in 2018  Hyponatremia was attributed to alcohol use  Fluid restriction was recommended  Alcohol avoidance and fluid restriction were recommended  I discussed the above with the patient  The patient  voiced understanding and agreement   ______________________________________________________________________    No chief complaint on file  HPI:  Oncology History    No history exists  Interval History:  Clinically stable  Fatigue  ECOG-  1 - Symptomatic but completely ambulatory    Review of Systems   Constitutional: Negative for chills and fever  HENT: Negative for nosebleeds  Eyes: Negative for discharge  Respiratory: Negative for cough and shortness of breath  Cardiovascular: Negative for chest pain  Gastrointestinal: Negative for abdominal pain, constipation and diarrhea  Endocrine: Negative for polydipsia  Genitourinary: Negative for hematuria  Musculoskeletal: Negative for arthralgias  Skin: Negative for color change  Allergic/Immunologic: Negative for immunocompromised state     Neurological: Negative for dizziness and headaches  Hematological: Negative for adenopathy  Psychiatric/Behavioral: Negative for agitation         Past Medical History:   Diagnosis Date    Alcohol abuse     Traore esophagus     Bowel obstruction (HCC)     Bowel perforation (HCC)     Cardiac disease     Continuous chronic alcoholism ([facility] [address] ) 10/5/2017    COPD (chronic obstructive pulmonary disease) (HCC)     History of shoulder surgery     Right shoulder    History of transfusion     Hx of cervical spine surgery     Hypertension     Incisional hernia 10/8/2017    MI, old     Mitral regurgitation     Psychiatric disorder     Seizures ([facility] [address] )      Patient Active Problem List   Diagnosis    Tobacco abuse    Essential hypertension    H/O suicide attempt    H/O cervical spine surgery    Hyponatremia    Dietary folate deficiency anemia    Ventral hernia without obstruction or gangrene    Hepatomegaly    Hepatic steatosis    Elevated alkaline phosphatase level    Alcoholic hepatitis without ascites    Vitamin D deficiency    Iron deficiency    Urinary retention    Bladder wall thickening    Generalized weakness    Malnutrition of moderate degree (HCC)    Hypokalemia    Seizures (HCC)    Continuous chronic alcoholism (HCC)    Incisional hernia    Hx of seizure disorder    Seizure-like activity (HCC)    Diarrhea    Abnormality of pancreatic duct    Allergic rhinitis    Microcytic anemia    Abdominal wound dehiscence    Cervical disc disorder with myelopathy    Cervical spinal stenosis    Depression    Left foot drop    Lumbar radiculopathy    Neuropathy involving both lower extremities    Peripheral neuropathy    T6 vertebral fracture (HCC)    Alcoholic cirrhosis of liver without ascites (HCC)    Thrombocytopenia (HCC)    Closed fracture of left olecranon process, initial encounter    Iron deficiency anemia due to chronic blood loss    Duodenal ulcer    Tubular adenoma    Traore esophagus  Celiac artery stenosis (HCC)    Pancreatic mass    Pancytopenia (HCC)    Constipation    Transaminitis    Lesion of spleen    Left anterior fascicular block    Abnormal EKG    Acute on chronic pancreatitis (HCC)    Pancreatic pseudocyst    COPD (chronic obstructive pulmonary disease) (HCC)    Ileus ([address] )    Ambulatory dysfunction    Current every day smoker    Fall    Hx of duodenal ulcer    Neck pain       Current Outpatient Medications:     albuterol (2 5 mg/3 mL) 0 083 % nebulizer solution, Take 2 5 mg by nebulization every 6 (six) hours as needed for wheezing or shortness of breath, Disp: , Rfl:     albuterol (PROVENTIL HFA,VENTOLIN HFA) 90 mcg/act inhaler, Inhale 2 puffs every 4 (four) hours as needed for wheezing or shortness of breath, Disp: 18 g, Rfl: 2    amitriptyline (ELAVIL) 25 mg tablet, Take 1 tablet (25 mg total) by mouth daily at bedtime as needed for sleep, Disp: 30 tablet, Rfl: 3    Cholecalciferol 25 MCG (1000 UT) tablet, Take 1 tablet (1,000 Units total) by mouth daily, Disp: 30 tablet, Rfl: 0    cyanocobalamin (VITAMIN B-12) 100 mcg tablet, Take 1 tablet (100 mcg total) by mouth daily, Disp: 30 tablet, Rfl: 5    dextromethorphan-guaifenesin (MUCINEX DM)  MG per 12 hr tablet, Take 1 tablet by mouth every 12 (twelve) hours, Disp: 14 tablet, Rfl: 0    docusate sodium (COLACE) 100 mg capsule, Take 1 capsule (100 mg total) by mouth 2 (two) times a day as needed for constipation, Disp: 30 capsule, Rfl: 0    ergocalciferol (VITAMIN D2) 50,000 units, Take 1 capsule (50,000 Units total) by mouth once a week, Disp: 13 capsule, Rfl: 1    esomeprazole (NexIUM) 40 MG capsule, Take 1 capsule (40 mg total) by mouth 2 (two) times a day before meals, Disp: 60 capsule, Rfl: 3    folic acid (FOLVITE) 1 mg tablet, Take 1 tablet (1 mg total) by mouth daily, Disp: 30 tablet, Rfl: 0    furosemide (LASIX) 20 mg tablet, Take 20 mg by mouth daily, Disp: , Rfl:     gabapentin (NEURONTIN) 100 mg capsule, Take 1 capsule (100 mg total) by mouth 3 (three) times a day, Disp: 90 capsule, Rfl: 3    levETIRAcetam (KEPPRA) 1000 MG tablet, Take 1 tablet (1,000 mg total) by mouth every 12 (twelve) hours, Disp: 60 tablet, Rfl: 2    lisinopril (ZESTRIL) 10 mg tablet, Take 1 tablet (10 mg total) by mouth daily, Disp: 30 tablet, Rfl: 5    Multiple Vitamin (TAB-A-BONI PO), Take 1 tablet by mouth daily, Disp: , Rfl:     pancrelipase, Lip-Prot-Amyl, (CREON) 6,000 units delayed release capsule, Take 6,000 units of lipase by mouth 3 (three) times a day with meals, Disp: 90 capsule, Rfl: 2    polyethylene glycol (GOLYTELY) 4000 mL solution, Take 4,000 mL by mouth once for 1 dose, Disp: 4000 mL, Rfl: 0    thiamine 100 MG tablet, Take 1 tablet (100 mg total) by mouth daily, Disp: 30 tablet, Rfl: 0  No Known Allergies  Past Surgical History:   Procedure Laterality Date    APPENDECTOMY      BACK SURGERY      CHOLECYSTECTOMY      ESOPHAGOGASTRODUODENOSCOPY N/A 11/28/2016    Procedure: ESOPHAGOGASTRODUODENOSCOPY (EGD); Surgeon: Hans Trevino MD;  Location:  GI LAB; Service:     GALLBLADDER SURGERY      LAPAROTOMY N/A 10/25/2016    Procedure: LAPAROTOMY EXPLORATORY;  Surgeon: Blas Silvestre MD;  Location: [facility];  Service:     MOUTH SURGERY      PERCUTANEOUS PINNING FEMORAL NECK FRACTURE      SHOULDER SURGERY Right     SHOULDER SURGERY      SMALL INTESTINE SURGERY      STOMACH SURGERY      bal surgery     Social History     Objective:  Vitals:    04/29/22 1352   BP: 108/70   Pulse: (!) 109   Temp: 98 °F (36 7 °C)   Weight: 55 3 kg (122 lb)   Height: 5' 6" (1 676 m)     Physical Exam  Constitutional:       Appearance: He is well-developed  HENT:      Head: Normocephalic and atraumatic  Eyes:      Pupils: Pupils are equal, round, and reactive to light  Cardiovascular:      Rate and Rhythm: Normal rate and regular rhythm  Heart sounds: No murmur heard        Pulmonary: Breath sounds: Normal breath sounds  No wheezing or rales  Abdominal:      Palpations: Abdomen is soft  Tenderness: There is no abdominal tenderness  Musculoskeletal:         General: No tenderness  Normal range of motion  Cervical back: Neck supple  Lymphadenopathy:      Cervical: No cervical adenopathy  Skin:     Findings: No erythema or rash  Neurological:      Mental Status: He is alert and oriented to person, place, and time  Cranial Nerves: No cranial nerve deficit  Deep Tendon Reflexes: Reflexes are normal and symmetric  Psychiatric:         Behavior: Behavior normal            Labs: I personally reviewed the labs and imaging pertinent to this patient care

## 2022-05-02 NOTE — ASSESSMENT & PLAN NOTE
See Flowsheet for immunotherapy administration. Patient waited in clinic 30 min for observation. Pt had epi pen on hand.     · Continue PO lisinopril for now  Outpatient follow-up with PCP in regards to this matter

## 2022-05-03 ENCOUNTER — APPOINTMENT (OUTPATIENT)
Dept: PHYSICAL THERAPY | Facility: CLINIC | Age: 57
End: 2022-05-03
Payer: COMMERCIAL

## 2022-05-03 LAB — LEVETIRACETAM SERPL-MCNC: <1 UG/ML (ref 10–40)

## 2022-05-04 ENCOUNTER — HOSPITAL ENCOUNTER (OUTPATIENT)
Dept: INFUSION CENTER | Facility: HOSPITAL | Age: 57
Discharge: HOME | End: 2022-05-04
Attending: INTERNAL MEDICINE

## 2022-05-06 ENCOUNTER — OFFICE VISIT (OUTPATIENT)
Dept: PHYSICAL THERAPY | Facility: CLINIC | Age: 57
End: 2022-05-06
Payer: COMMERCIAL

## 2022-05-06 DIAGNOSIS — R26.2 AMBULATORY DYSFUNCTION: Primary | ICD-10-CM

## 2022-05-06 PROCEDURE — 97110 THERAPEUTIC EXERCISES: CPT

## 2022-05-06 PROCEDURE — 97140 MANUAL THERAPY 1/> REGIONS: CPT

## 2022-05-06 PROCEDURE — 97112 NEUROMUSCULAR REEDUCATION: CPT

## 2022-05-06 NOTE — PROGRESS NOTES
Daily Note     Today's date: 2022  Patient name: Esther Suarez  : 1965  MRN: [id number]  Referring provider: Tatiana Garcia, *  Dx:   Encounter Diagnosis     ICD-10-CM    1  Ambulatory dysfunction  R26 2                   Subjective: Patient reports he has been experiencing m spasms and knee buckle  He gets iron infusions Wednesday  He notices slight strength improvement  Objective: See treatment diary below  Increased reps today  Assessment: Tolerated treatment well  Patient demonstrated fatigue post treatment and would benefit from continued PT to improve strength and endurance to improve balance and functionality  Tandem stance has limitations  He fatigues easily due to lack of endurance, and requires resting between TE  Was able to tolerate more reps today  Plan: Continue per plan of care        Precautions: balance and neuropathy      Manuals 4- 4-28 5-6      Joint work         -hip         -knee         ST work         Flexibility/PROM  hamstring and piriformis stretch x8' HS, piriformis, calves                        Neuro Re-Ed         balance  shoulder width staticNBOS EO 3x20", NBOS EO 3x20" NBOS EO 20"x3 3 ea        tandem x10" x3 ea tandem 20" x3 ea               Step-ups fwd         Step-ups lat                  lunge         Ther Ex         NuStep/bike  NS 10' NS L4 10'                        SLR program Flex x20 SLR 2x10 ea SLR 3x10 ea      clamshells         bridges x20 x20 3" holds 3x10      Leg press         Hamstring strength  otb 2x12 otb 2x10 ea      Standing hip abd x20 2x10 ea 2x10 ea      Standing hip flex x20        Quad strength laq 2x10 saq x30 ea LAQ 1# 3x10 SAQ 1#  3x10      squats  x8 2x5      Standing HR   2x10       Ther Activity         Stair negotiation                  Gait Training                           Modalities

## 2022-05-10 ENCOUNTER — OFFICE VISIT (OUTPATIENT)
Dept: PHYSICAL THERAPY | Facility: CLINIC | Age: 57
End: 2022-05-10
Payer: COMMERCIAL

## 2022-05-10 DIAGNOSIS — R26.2 AMBULATORY DYSFUNCTION: Primary | ICD-10-CM

## 2022-05-10 DIAGNOSIS — D50.0 IRON DEFICIENCY ANEMIA DUE TO CHRONIC BLOOD LOSS: Primary | ICD-10-CM

## 2022-05-10 DIAGNOSIS — E55.9 VITAMIN D DEFICIENCY: ICD-10-CM

## 2022-05-10 PROCEDURE — 97140 MANUAL THERAPY 1/> REGIONS: CPT | Performed by: PHYSICAL THERAPIST

## 2022-05-10 PROCEDURE — 97110 THERAPEUTIC EXERCISES: CPT | Performed by: PHYSICAL THERAPIST

## 2022-05-10 RX ORDER — SODIUM CHLORIDE 9 MG/ML
20 INJECTION, SOLUTION INTRAVENOUS ONCE
Status: CANCELLED | OUTPATIENT
Start: 2022-05-11

## 2022-05-10 RX ORDER — ERGOCALCIFEROL 1.25 MG/1
50000 CAPSULE ORAL WEEKLY
Qty: 13 CAPSULE | Refills: 1 | Status: SHIPPED | OUTPATIENT
Start: 2022-05-10

## 2022-05-10 NOTE — PROGRESS NOTES
Daily Note     Today's date: 5/10/2022  Patient name: Dylan Beach  : 1965  MRN: [id number]  Referring provider: German Gabriel, *  Dx:   Encounter Diagnosis     ICD-10-CM    1  Ambulatory dysfunction  R26 2                   Subjective: over did it yesterday wiping walls down so very sore  Wants to focus more on stretching today b/c sore      Objective: See treatment diary below      Assessment: kept TE basic as pt was subjectively very fatigued after working a lot at home yesterday  Pt asked to increase time on NuStep today   Right side stiffer than left with hamstring mobility      Plan: return to additional TE, endurance/balance work     Precautions: balance and neuropathy      Manuals 4-26 4-28 5-6 5-10     Joint work         -hip         -knee         ST work         Flexibility/PROM  hamstring and piriformis stretch x8' HS, piriformis, calves B/l HS, piriformis, gastroc stretching                       Neuro Re-Ed         balance  shoulder width staticNBOS EO 3x20", NBOS EO 3x20" NBOS EO 20"x3 3 ea        tandem x10" x3 ea tandem 20" x3 ea               Step-ups fwd         Step-ups lat                  lunge         Ther Ex         NuStep/bike  NS 10' NS L4 10' NS L4 15'                       SLR program Flex x20 SLR 2x10 ea SLR 3x10 ea      clamshells    otb 3x10     bridges x20 x20 3" holds 3x10 3" 3x10     Leg press         Hamstring strength  otb 2x12 otb 2x10 ea      Standing hip abd x20 2x10 ea 2x10 ea      Standing hip flex x20        Quad strength laq 2x10 saq x30 ea LAQ 1# 3x10 SAQ 1#  3x10      squats  x8 2x5      Standing HR   2x10       Ther Activity         Stair negotiation                  Gait Training                           Modalities

## 2022-05-11 ENCOUNTER — HOSPITAL ENCOUNTER (OUTPATIENT)
Dept: INFUSION CENTER | Facility: HOSPITAL | Age: 57
Discharge: HOME | End: 2022-05-11
Attending: INTERNAL MEDICINE
Payer: COMMERCIAL

## 2022-05-11 VITALS
DIASTOLIC BLOOD PRESSURE: 75 MMHG | SYSTOLIC BLOOD PRESSURE: 131 MMHG | OXYGEN SATURATION: 97 % | HEART RATE: 82 BPM | TEMPERATURE: 96.3 F | RESPIRATION RATE: 16 BRPM

## 2022-05-11 DIAGNOSIS — D50.0 IRON DEFICIENCY ANEMIA DUE TO CHRONIC BLOOD LOSS: Primary | ICD-10-CM

## 2022-05-11 PROCEDURE — 96366 THER/PROPH/DIAG IV INF ADDON: CPT

## 2022-05-11 PROCEDURE — 96365 THER/PROPH/DIAG IV INF INIT: CPT

## 2022-05-11 RX ORDER — SODIUM CHLORIDE 9 MG/ML
20 INJECTION, SOLUTION INTRAVENOUS ONCE
Status: COMPLETED | OUTPATIENT
Start: 2022-05-11 | End: 2022-05-11

## 2022-05-11 RX ORDER — SODIUM CHLORIDE 9 MG/ML
20 INJECTION, SOLUTION INTRAVENOUS ONCE
Status: CANCELLED | OUTPATIENT
Start: 2022-05-18

## 2022-05-11 RX ADMIN — IRON SUCROSE 300 MG: 20 INJECTION, SOLUTION INTRAVENOUS at 08:33

## 2022-05-11 RX ADMIN — SODIUM CHLORIDE 20 ML/HR: 0.9 INJECTION, SOLUTION INTRAVENOUS at 08:17

## 2022-05-11 NOTE — PLAN OF CARE
Problem: Potential for Falls  Goal: Patient will remain free of falls  Description: INTERVENTIONS:  - Educate patient/family on patient safety including physical limitations  - Instruct patient to call for assistance with activity   - Keep Call bell within reach  - Keep chair locked and adjusted as appropriate  - Keep care items and personal belongings within reach  - Initiate and maintain comfort rounds  - Consider moving patient to room near nurses station  5/11/2022 0825 by Gerald Smith, RN  Outcome: Progressing  5/11/2022 0825 by Gerald Smith, RN  Outcome: Progressing     Problem: INFECTION - ADULT  Goal: Absence of fever/infection during neutropenic period  Description: INTERVENTIONS:  - Monitor WBC    Outcome: Progressing

## 2022-05-13 ENCOUNTER — OFFICE VISIT (OUTPATIENT)
Dept: PHYSICAL THERAPY | Facility: CLINIC | Age: 57
End: 2022-05-13
Payer: COMMERCIAL

## 2022-05-13 DIAGNOSIS — R26.2 AMBULATORY DYSFUNCTION: Primary | ICD-10-CM

## 2022-05-13 PROCEDURE — 97140 MANUAL THERAPY 1/> REGIONS: CPT

## 2022-05-13 PROCEDURE — 97112 NEUROMUSCULAR REEDUCATION: CPT

## 2022-05-13 PROCEDURE — 97110 THERAPEUTIC EXERCISES: CPT

## 2022-05-13 NOTE — PROGRESS NOTES
Daily Note     Today's date: 2022  Patient name: Farnaz Walker  : 1965  MRN: [id number]  Referring provider: Harley Head, *  Dx:   Encounter Diagnosis     ICD-10-CM    1  Ambulatory dysfunction  R26 2                   Subjective: Patient reports he is feeling better since last session  He has been compliant with HEP  Objective: See treatment diary below  Assessment: Tolerated treatment well  Patient demonstrated fatigue post treatment, exhibited good technique with therapeutic exercises and would benefit from continued PT to improve balance and B LE strength for functionality  His strength seems to have improved since his initial visit and he has more endurance due to being able to tolerate longer periods of activity w/o resting  Balance in a tandem position is still limited at this time but righting reactions have improved  Plan: Continue per plan of care        Precautions: balance and neuropathy      Manuals 4-26 4-28 5-6 5-10 5-13    Joint work         -hip         -knee         ST work         Flexibility/PROM  hamstring and piriformis stretch x8' HS, piriformis, calves B/l HS, piriformis, gastroc stretching HS, piriformis, calves                      Neuro Re-Ed         balance  shoulder width staticNBOS EO 3x20", NBOS EO 3x20" NBOS EO 20"x3 3 ea  NBOS EO 30"x3 ea      tandem x10" x3 ea tandem 20" x3 ea  Tandem 20"x3 ea             Step-ups fwd         Step-ups lat                  lunge         Ther Ex         NuStep/bike  NS 10' NS L4 10' NS L4 15' NS L4 15'                      SLR program Flex x20 SLR 2x10 ea SLR 3x10 ea  SLR 1# 3x10    clamshells    otb 3x10     bridges x20 x20 3" holds 3x10 3" 3x10 3"x30    Leg press         Hamstring strength  otb 2x12 otb 2x10 ea  otb 2x10 ea    Standing hip abd x20 2x10 ea 2x10 ea  2x10 ea    Standing hip flex x20        Quad strength laq 2x10 saq x30 ea LAQ 1# 3x10 SAQ 1#  3x10  LAQ 1# 3x10  SAQ 1# 3x10    squats  x8 2x5  2x10 Standing HR   2x10   2x10    Ther Activity         Stair negotiation                  Gait Training                           Modalities

## 2022-05-17 ENCOUNTER — APPOINTMENT (OUTPATIENT)
Dept: PHYSICAL THERAPY | Facility: CLINIC | Age: 57
End: 2022-05-17
Payer: COMMERCIAL

## 2022-05-20 ENCOUNTER — HOSPITAL ENCOUNTER (INPATIENT)
Facility: HOSPITAL | Age: 57
LOS: 6 days | Discharge: HOME | DRG: 137 | End: 2022-05-26
Attending: EMERGENCY MEDICINE | Admitting: INTERNAL MEDICINE
Payer: COMMERCIAL

## 2022-05-20 ENCOUNTER — APPOINTMENT (OUTPATIENT)
Dept: PHYSICAL THERAPY | Facility: CLINIC | Age: 57
End: 2022-05-20
Payer: COMMERCIAL

## 2022-05-20 ENCOUNTER — APPOINTMENT (EMERGENCY)
Dept: CT IMAGING | Facility: HOSPITAL | Age: 57
DRG: 137 | End: 2022-05-20
Payer: COMMERCIAL

## 2022-05-20 ENCOUNTER — APPOINTMENT (EMERGENCY)
Dept: RADIOLOGY | Facility: HOSPITAL | Age: 57
DRG: 137 | End: 2022-05-20
Payer: COMMERCIAL

## 2022-05-20 DIAGNOSIS — D69.6 THROMBOCYTOPENIA (HCC): ICD-10-CM

## 2022-05-20 DIAGNOSIS — D50.9 MICROCYTIC ANEMIA: ICD-10-CM

## 2022-05-20 DIAGNOSIS — G40.909 SEIZURE DISORDER (HCC): ICD-10-CM

## 2022-05-20 DIAGNOSIS — E27.49 CORTISOL DEFICIENCY (HCC): ICD-10-CM

## 2022-05-20 DIAGNOSIS — R56.9 SEIZURE-LIKE ACTIVITY (HCC): ICD-10-CM

## 2022-05-20 DIAGNOSIS — E83.42 HYPOMAGNESEMIA: ICD-10-CM

## 2022-05-20 DIAGNOSIS — R94.31 ABNORMAL EKG: ICD-10-CM
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
## 2022-05-20 DIAGNOSIS — E87.6 HYPOKALEMIA: ICD-10-CM

## 2022-05-20 DIAGNOSIS — E87.1 HYPONATREMIA: ICD-10-CM

## 2022-05-20 DIAGNOSIS — S22.000A COMPRESSION FRACTURE OF THORACIC VERTEBRA, UNSPECIFIED THORACIC VERTEBRAL LEVEL, INITIAL ENCOUNTER (HCC): ICD-10-CM

## 2022-05-20 DIAGNOSIS — I77.819 AORTIC ECTASIA (HCC): ICD-10-CM

## 2022-05-20 DIAGNOSIS — S09.90XA CLOSED HEAD INJURY, INITIAL ENCOUNTER: ICD-10-CM
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
## 2022-05-20 DIAGNOSIS — N32.89 BLADDER WALL THICKENING: ICD-10-CM

## 2022-05-20 DIAGNOSIS — Z86.69 HX OF SEIZURE DISORDER: ICD-10-CM

## 2022-05-20 DIAGNOSIS — R09.02 HYPOXIA: ICD-10-CM

## 2022-05-20 DIAGNOSIS — L29.9 PRURITIC CONDITION: ICD-10-CM

## 2022-05-20 DIAGNOSIS — S42.009A CLAVICULAR FRACTURE: ICD-10-CM

## 2022-05-20 DIAGNOSIS — Z72.89 ALCOHOL USE: ICD-10-CM

## 2022-05-20 DIAGNOSIS — S22.060A CLOSED WEDGE COMPRESSION FRACTURE OF T8 VERTEBRA, INITIAL ENCOUNTER (HCC): ICD-10-CM

## 2022-05-20 DIAGNOSIS — K86.3 PANCREATIC PSEUDOCYST: ICD-10-CM

## 2022-05-20 DIAGNOSIS — S22.41XA CLOSED FRACTURE OF MULTIPLE RIBS OF RIGHT SIDE, INITIAL ENCOUNTER: ICD-10-CM

## 2022-05-20 DIAGNOSIS — U07.1 COVID-19 VIRUS INFECTION: ICD-10-CM

## 2022-05-20 PROBLEM — S22.49XA RIB FRACTURES: Status: ACTIVE | Noted: 2022-05-20

## 2022-05-20 PROBLEM — Z78.9 ALCOHOL USE: Status: ACTIVE | Noted: 2022-05-20

## 2022-05-20 PROBLEM — F10.90 ALCOHOL USE: Status: ACTIVE | Noted: 2022-05-20

## 2022-05-20 PROBLEM — K22.70 BARRETT'S ESOPHAGUS: Status: ACTIVE | Noted: 2022-05-20

## 2022-05-20 LAB
2HR DELTA HS TROPONIN: 0 NG/L
4HR DELTA HS TROPONIN: -1 NG/L
ABO GROUP BLD: NORMAL
ALBUMIN SERPL BCP-MCNC: 2.9 G/DL (ref 3.5–5)
ALP SERPL-CCNC: 120 U/L (ref 46–116)
ALT SERPL W P-5'-P-CCNC: 43 U/L (ref 12–78)
ANION GAP SERPL CALCULATED.3IONS-SCNC: 10 MMOL/L (ref 4–13)
ANION GAP SERPL CALCULATED.3IONS-SCNC: 10 MMOL/L (ref 4–13)
ANION GAP SERPL CALCULATED.3IONS-SCNC: 9 MMOL/L (ref 4–13)
AST SERPL W P-5'-P-CCNC: 101 U/L (ref 5–45)
ATRIAL RATE: 90 BPM
BASE EX.OXY STD BLDV CALC-SCNC: 53.7 % (ref 60–80)
BASE EXCESS BLDV CALC-SCNC: 3.9 MMOL/L
BASOPHILS # BLD AUTO: 0.01 THOUSANDS/ΜL (ref 0–0.1)
BASOPHILS NFR BLD AUTO: 0 % (ref 0–1)
BILIRUB DIRECT SERPL-MCNC: 0.49 MG/DL (ref 0–0.2)
BILIRUB SERPL-MCNC: 0.89 MG/DL (ref 0.2–1)
BILIRUB UR QL STRIP: NEGATIVE
BLD GP AB SCN SERPL QL: NEGATIVE
BUN SERPL-MCNC: 10 MG/DL (ref 5–25)
CALCIUM SERPL-MCNC: 7.4 MG/DL (ref 8.3–10.1)
CALCIUM SERPL-MCNC: 7.4 MG/DL (ref 8.3–10.1)
CALCIUM SERPL-MCNC: 7.5 MG/DL (ref 8.3–10.1)
CARDIAC TROPONIN I PNL SERPL HS: 10 NG/L
CARDIAC TROPONIN I PNL SERPL HS: 11 NG/L
CARDIAC TROPONIN I PNL SERPL HS: 11 NG/L
CHLORIDE SERPL-SCNC: 81 MMOL/L (ref 100–108)
CHLORIDE SERPL-SCNC: 81 MMOL/L (ref 100–108)
CHLORIDE SERPL-SCNC: 82 MMOL/L (ref 100–108)
CLARITY UR: CLEAR
CO2 SERPL-SCNC: 26 MMOL/L (ref 21–32)
CO2 SERPL-SCNC: 27 MMOL/L (ref 21–32)
CO2 SERPL-SCNC: 29 MMOL/L (ref 21–32)
COLOR UR: YELLOW
CREAT SERPL-MCNC: 0.99 MG/DL (ref 0.6–1.3)
CREAT SERPL-MCNC: 1.07 MG/DL (ref 0.6–1.3)
CREAT SERPL-MCNC: 1.08 MG/DL (ref 0.6–1.3)
EOSINOPHIL # BLD AUTO: 0 THOUSAND/ΜL (ref 0–0.61)
EOSINOPHIL NFR BLD AUTO: 0 % (ref 0–6)
ERYTHROCYTE [DISTWIDTH] IN BLOOD BY AUTOMATED COUNT: 23.7 % (ref 11.6–15.1)
ETHANOL SERPL-MCNC: <3 MG/DL (ref 0–3)
FLUAV RNA RESP QL NAA+PROBE: NEGATIVE
FLUBV RNA RESP QL NAA+PROBE: NEGATIVE
GFR SERPL CREATININE-BSD FRML MDRD: 76 ML/MIN/1.73SQ M
GFR SERPL CREATININE-BSD FRML MDRD: 77 ML/MIN/1.73SQ M
GFR SERPL CREATININE-BSD FRML MDRD: 85 ML/MIN/1.73SQ M
GLUCOSE SERPL-MCNC: 143 MG/DL (ref 65–140)
GLUCOSE SERPL-MCNC: 166 MG/DL (ref 65–140)
GLUCOSE SERPL-MCNC: 91 MG/DL (ref 65–140)
GLUCOSE UR STRIP-MCNC: NEGATIVE MG/DL
HCO3 BLDV-SCNC: 28.3 MMOL/L (ref 24–30)
HCT VFR BLD AUTO: 26.3 % (ref 36.5–49.3)
HGB BLD-MCNC: 8.5 G/DL (ref 12–17)
HGB UR QL STRIP.AUTO: NEGATIVE
IMM GRANULOCYTES # BLD AUTO: 0.05 THOUSAND/UL (ref 0–0.2)
IMM GRANULOCYTES NFR BLD AUTO: 1 % (ref 0–2)
INR PPP: 0.98 (ref 0.84–1.19)
KETONES UR STRIP-MCNC: NEGATIVE MG/DL
LEUKOCYTE ESTERASE UR QL STRIP: NEGATIVE
LYMPHOCYTES # BLD AUTO: 0.81 THOUSANDS/ΜL (ref 0.6–4.47)
LYMPHOCYTES NFR BLD AUTO: 16 % (ref 14–44)
MAGNESIUM SERPL-MCNC: 1.1 MG/DL (ref 1.6–2.6)
MAGNESIUM SERPL-MCNC: 2.4 MG/DL (ref 1.6–2.6)
MCH RBC QN AUTO: 22.6 PG (ref 26.8–34.3)
MCHC RBC AUTO-ENTMCNC: 32.3 G/DL (ref 31.4–37.4)
MCV RBC AUTO: 70 FL (ref 82–98)
MONOCYTES # BLD AUTO: 0.38 THOUSAND/ΜL (ref 0.17–1.22)
MONOCYTES NFR BLD AUTO: 7 % (ref 4–12)
NEUTROPHILS # BLD AUTO: 3.97 THOUSANDS/ΜL (ref 1.85–7.62)
NEUTS SEG NFR BLD AUTO: 76 % (ref 43–75)
NITRITE UR QL STRIP: NEGATIVE
NRBC BLD AUTO-RTO: 0 /100 WBCS
NT-PROBNP SERPL-MCNC: 253 PG/ML
O2 CT BLDV-SCNC: 7.3 ML/DL
OSMOLALITY UR/SERPL-RTO: 253 MMOL/KG (ref 282–298)
P AXIS: 75 DEGREES
PCO2 BLDV: 42.2 MM HG (ref 42–50)
PH BLDV: 7.45 [PH] (ref 7.3–7.4)
PH UR STRIP.AUTO: 6 [PH]
PHOSPHATE SERPL-MCNC: 1.5 MG/DL (ref 2.7–4.5)
PLATELET # BLD AUTO: 86 THOUSANDS/UL (ref 149–390)
PO2 BLDV: 32 MM HG (ref 35–45)
POTASSIUM SERPL-SCNC: 2.3 MMOL/L (ref 3.5–5.3)
POTASSIUM SERPL-SCNC: 2.8 MMOL/L (ref 3.5–5.3)
POTASSIUM SERPL-SCNC: 3.7 MMOL/L (ref 3.5–5.3)
PR INTERVAL: 154 MS
PROT SERPL-MCNC: 6.9 G/DL (ref 6.4–8.2)
PROT UR STRIP-MCNC: NEGATIVE MG/DL
PROTHROMBIN TIME: 12.5 SECONDS (ref 11.6–14.5)
QRS AXIS: -47 DEGREES
QRSD INTERVAL: 94 MS
QT INTERVAL: 408 MS
QTC INTERVAL: 499 MS
RBC # BLD AUTO: 3.76 MILLION/UL (ref 3.88–5.62)
RH BLD: POSITIVE
RSV RNA RESP QL NAA+PROBE: NEGATIVE
SARS-COV-2 RNA RESP QL NAA+PROBE: POSITIVE
SODIUM 24H UR-SCNC: 8 MOL/L
SODIUM SERPL-SCNC: 117 MMOL/L (ref 136–145)
SODIUM SERPL-SCNC: 118 MMOL/L (ref 136–145)
SODIUM SERPL-SCNC: 120 MMOL/L (ref 136–145)
SP GR UR STRIP.AUTO: 1.01 (ref 1–1.03)
SPECIMEN EXPIRATION DATE: NORMAL
T WAVE AXIS: 88 DEGREES
TSH SERPL DL<=0.05 MIU/L-ACNC: 2.44 UIU/ML (ref 0.45–4.5)
UROBILINOGEN UR QL STRIP.AUTO: 0.2 E.U./DL
VENTRICULAR RATE: 90 BPM
WBC # BLD AUTO: 5.22 THOUSAND/UL (ref 4.31–10.16)

## 2022-05-20 PROCEDURE — 99285 EMERGENCY DEPT VISIT HI MDM: CPT

## 2022-05-20 PROCEDURE — 96365 THER/PROPH/DIAG IV INF INIT: CPT

## 2022-05-20 PROCEDURE — 36415 COLL VENOUS BLD VENIPUNCTURE: CPT | Performed by: EMERGENCY MEDICINE

## 2022-05-20 PROCEDURE — 85025 COMPLETE CBC W/AUTO DIFF WBC: CPT | Performed by: EMERGENCY MEDICINE

## 2022-05-20 PROCEDURE — 82077 ASSAY SPEC XCP UR&BREATH IA: CPT | Performed by: INTERNAL MEDICINE

## 2022-05-20 PROCEDURE — 83735 ASSAY OF MAGNESIUM: CPT | Performed by: EMERGENCY MEDICINE

## 2022-05-20 PROCEDURE — 86900 BLOOD TYPING SEROLOGIC ABO: CPT | Performed by: EMERGENCY MEDICINE

## 2022-05-20 PROCEDURE — 71250 CT THORAX DX C-: CPT

## 2022-05-20 PROCEDURE — 84300 ASSAY OF URINE SODIUM: CPT | Performed by: INTERNAL MEDICINE

## 2022-05-20 PROCEDURE — 84484 ASSAY OF TROPONIN QUANT: CPT | Performed by: INTERNAL MEDICINE

## 2022-05-20 PROCEDURE — 80048 BASIC METABOLIC PNL TOTAL CA: CPT | Performed by: EMERGENCY MEDICINE

## 2022-05-20 PROCEDURE — 86850 RBC ANTIBODY SCREEN: CPT | Performed by: EMERGENCY MEDICINE

## 2022-05-20 PROCEDURE — 84100 ASSAY OF PHOSPHORUS: CPT | Performed by: INTERNAL MEDICINE

## 2022-05-20 PROCEDURE — 0241U HB NFCT DS VIR RESP RNA 4 TRGT: CPT | Performed by: INTERNAL MEDICINE

## 2022-05-20 PROCEDURE — 84484 ASSAY OF TROPONIN QUANT: CPT | Performed by: EMERGENCY MEDICINE

## 2022-05-20 PROCEDURE — 71045 X-RAY EXAM CHEST 1 VIEW: CPT

## 2022-05-20 PROCEDURE — 83735 ASSAY OF MAGNESIUM: CPT | Performed by: INTERNAL MEDICINE

## 2022-05-20 PROCEDURE — 83930 ASSAY OF BLOOD OSMOLALITY: CPT | Performed by: INTERNAL MEDICINE

## 2022-05-20 PROCEDURE — 87086 URINE CULTURE/COLONY COUNT: CPT | Performed by: INTERNAL MEDICINE

## 2022-05-20 PROCEDURE — 96375 TX/PRO/DX INJ NEW DRUG ADDON: CPT

## 2022-05-20 PROCEDURE — 83880 ASSAY OF NATRIURETIC PEPTIDE: CPT | Performed by: EMERGENCY MEDICINE

## 2022-05-20 PROCEDURE — 72125 CT NECK SPINE W/O DYE: CPT

## 2022-05-20 PROCEDURE — 93005 ELECTROCARDIOGRAM TRACING: CPT

## 2022-05-20 PROCEDURE — 74176 CT ABD & PELVIS W/O CONTRAST: CPT

## 2022-05-20 PROCEDURE — 80076 HEPATIC FUNCTION PANEL: CPT | Performed by: EMERGENCY MEDICINE

## 2022-05-20 PROCEDURE — 99285 EMERGENCY DEPT VISIT HI MDM: CPT | Performed by: EMERGENCY MEDICINE

## 2022-05-20 PROCEDURE — 84443 ASSAY THYROID STIM HORMONE: CPT | Performed by: EMERGENCY MEDICINE

## 2022-05-20 PROCEDURE — 81003 URINALYSIS AUTO W/O SCOPE: CPT | Performed by: INTERNAL MEDICINE

## 2022-05-20 PROCEDURE — 86901 BLOOD TYPING SEROLOGIC RH(D): CPT | Performed by: EMERGENCY MEDICINE

## 2022-05-20 PROCEDURE — 80048 BASIC METABOLIC PNL TOTAL CA: CPT | Performed by: INTERNAL MEDICINE

## 2022-05-20 PROCEDURE — 93010 ELECTROCARDIOGRAM REPORT: CPT | Performed by: INTERNAL MEDICINE

## 2022-05-20 PROCEDURE — 70450 CT HEAD/BRAIN W/O DYE: CPT

## 2022-05-20 PROCEDURE — G1004 CDSM NDSC: HCPCS

## 2022-05-20 PROCEDURE — 85610 PROTHROMBIN TIME: CPT | Performed by: EMERGENCY MEDICINE

## 2022-05-20 PROCEDURE — 99223 1ST HOSP IP/OBS HIGH 75: CPT | Performed by: INTERNAL MEDICINE

## 2022-05-20 PROCEDURE — 82805 BLOOD GASES W/O2 SATURATION: CPT | Performed by: EMERGENCY MEDICINE

## 2022-05-20 RX ORDER — ALBUTEROL SULFATE 2.5 MG/3ML
2.5 SOLUTION RESPIRATORY (INHALATION) EVERY 6 HOURS PRN
Status: DISCONTINUED | OUTPATIENT
Start: 2022-05-20 | End: 2022-05-26 | Stop reason: HOSPADM

## 2022-05-20 RX ORDER — DEXAMETHASONE SODIUM PHOSPHATE 4 MG/ML
6 INJECTION, SOLUTION INTRA-ARTICULAR; INTRALESIONAL; INTRAMUSCULAR; INTRAVENOUS; SOFT TISSUE EVERY 24 HOURS
Status: DISCONTINUED | OUTPATIENT
Start: 2022-05-20 | End: 2022-05-23

## 2022-05-20 RX ORDER — FENTANYL CITRATE 50 UG/ML
25 INJECTION, SOLUTION INTRAMUSCULAR; INTRAVENOUS ONCE
Status: COMPLETED | OUTPATIENT
Start: 2022-05-20 | End: 2022-05-20

## 2022-05-20 RX ORDER — MAGNESIUM SULFATE HEPTAHYDRATE 40 MG/ML
2 INJECTION, SOLUTION INTRAVENOUS
Status: COMPLETED | OUTPATIENT
Start: 2022-05-20 | End: 2022-05-20

## 2022-05-20 RX ORDER — HEPARIN SODIUM 5000 [USP'U]/ML
5000 INJECTION, SOLUTION INTRAVENOUS; SUBCUTANEOUS EVERY 8 HOURS SCHEDULED
Status: DISCONTINUED | OUTPATIENT
Start: 2022-05-20 | End: 2022-05-23

## 2022-05-20 RX ORDER — LIDOCAINE 50 MG/G
2 PATCH TOPICAL ONCE
Status: COMPLETED | OUTPATIENT
Start: 2022-05-20 | End: 2022-05-20

## 2022-05-20 RX ORDER — ONDANSETRON 2 MG/ML
4 INJECTION INTRAMUSCULAR; INTRAVENOUS EVERY 6 HOURS PRN
Status: DISCONTINUED | OUTPATIENT
Start: 2022-05-20 | End: 2022-05-26 | Stop reason: HOSPADM

## 2022-05-20 RX ORDER — OXYCODONE HYDROCHLORIDE 5 MG/1
5 TABLET ORAL EVERY 4 HOURS PRN
Status: DISCONTINUED | OUTPATIENT
Start: 2022-05-20 | End: 2022-05-21

## 2022-05-20 RX ORDER — SODIUM CHLORIDE 9 MG/ML
100 INJECTION, SOLUTION INTRAVENOUS CONTINUOUS
Status: DISCONTINUED | OUTPATIENT
Start: 2022-05-20 | End: 2022-05-20

## 2022-05-20 RX ORDER — SODIUM CHLORIDE AND POTASSIUM CHLORIDE .9; .15 G/100ML; G/100ML
150 SOLUTION INTRAVENOUS CONTINUOUS
Status: ACTIVE | OUTPATIENT
Start: 2022-05-20 | End: 2022-05-20

## 2022-05-20 RX ORDER — HYDROMORPHONE HCL/PF 1 MG/ML
0.5 SYRINGE (ML) INJECTION EVERY 4 HOURS PRN
Status: DISCONTINUED | OUTPATIENT
Start: 2022-05-20 | End: 2022-05-21

## 2022-05-20 RX ORDER — LEVETIRACETAM 500 MG/1
1000 TABLET ORAL EVERY 12 HOURS SCHEDULED
Status: DISCONTINUED | OUTPATIENT
Start: 2022-05-20 | End: 2022-05-26 | Stop reason: HOSPADM

## 2022-05-20 RX ORDER — DOCUSATE SODIUM 100 MG/1
100 CAPSULE, LIQUID FILLED ORAL 2 TIMES DAILY PRN
Status: DISCONTINUED | OUTPATIENT
Start: 2022-05-20 | End: 2022-05-26 | Stop reason: HOSPADM

## 2022-05-20 RX ORDER — POTASSIUM CHLORIDE 20 MEQ/1
40 TABLET, EXTENDED RELEASE ORAL 2 TIMES DAILY WITH MEALS
Status: COMPLETED | OUTPATIENT
Start: 2022-05-20 | End: 2022-05-21

## 2022-05-20 RX ORDER — PANTOPRAZOLE SODIUM 40 MG/1
40 TABLET, DELAYED RELEASE ORAL
Status: DISCONTINUED | OUTPATIENT
Start: 2022-05-21 | End: 2022-05-26 | Stop reason: HOSPADM

## 2022-05-20 RX ORDER — ACETAMINOPHEN 325 MG/1
975 TABLET ORAL ONCE
Status: COMPLETED | OUTPATIENT
Start: 2022-05-20 | End: 2022-05-20

## 2022-05-20 RX ORDER — NICOTINE 21 MG/24HR
1 PATCH, TRANSDERMAL 24 HOURS TRANSDERMAL DAILY
Status: DISCONTINUED | OUTPATIENT
Start: 2022-05-20 | End: 2022-05-22

## 2022-05-20 RX ORDER — MELATONIN
1000 DAILY
Status: DISCONTINUED | OUTPATIENT
Start: 2022-05-21 | End: 2022-05-22

## 2022-05-20 RX ORDER — POTASSIUM CHLORIDE 14.9 MG/ML
20 INJECTION INTRAVENOUS
Status: COMPLETED | OUTPATIENT
Start: 2022-05-20 | End: 2022-05-20

## 2022-05-20 RX ADMIN — PANCRELIPASE 6000 UNITS: 30000; 6000; 19000 CAPSULE, DELAYED RELEASE PELLETS ORAL at 15:40

## 2022-05-20 RX ADMIN — LEVETIRACETAM 1000 MG: 500 TABLET, FILM COATED ORAL at 20:58

## 2022-05-20 RX ADMIN — DEXAMETHASONE SODIUM PHOSPHATE 6 MG: 4 INJECTION, SOLUTION INTRAMUSCULAR; INTRAVENOUS at 13:39

## 2022-05-20 RX ADMIN — PANCRELIPASE 6000 UNITS: 30000; 6000; 19000 CAPSULE, DELAYED RELEASE PELLETS ORAL at 12:54

## 2022-05-20 RX ADMIN — VITAM B12 100 MCG: 100 TAB at 12:47

## 2022-05-20 RX ADMIN — POTASSIUM CHLORIDE 40 MEQ: 20 TABLET, EXTENDED RELEASE ORAL at 15:40

## 2022-05-20 RX ADMIN — REMDESIVIR 200 MG: 100 INJECTION, POWDER, LYOPHILIZED, FOR SOLUTION INTRAVENOUS at 15:40

## 2022-05-20 RX ADMIN — MAGNESIUM SULFATE HEPTAHYDRATE 2 G: 40 INJECTION, SOLUTION INTRAVENOUS at 12:47

## 2022-05-20 RX ADMIN — LEVETIRACETAM 1000 MG: 500 TABLET, FILM COATED ORAL at 12:47

## 2022-05-20 RX ADMIN — POTASSIUM CHLORIDE 20 MEQ: 14.9 INJECTION, SOLUTION INTRAVENOUS at 10:47

## 2022-05-20 RX ADMIN — ACETAMINOPHEN 975 MG: 325 TABLET, FILM COATED ORAL at 08:41

## 2022-05-20 RX ADMIN — HEPARIN SODIUM 5000 UNITS: 5000 INJECTION INTRAVENOUS; SUBCUTANEOUS at 13:39

## 2022-05-20 RX ADMIN — HYDROMORPHONE HYDROCHLORIDE 0.5 MG: 1 INJECTION, SOLUTION INTRAMUSCULAR; INTRAVENOUS; SUBCUTANEOUS at 13:39

## 2022-05-20 RX ADMIN — FOLIC ACID: 5 INJECTION, SOLUTION INTRAMUSCULAR; INTRAVENOUS; SUBCUTANEOUS at 13:39

## 2022-05-20 RX ADMIN — HYDROMORPHONE HYDROCHLORIDE 0.5 MG: 1 INJECTION, SOLUTION INTRAMUSCULAR; INTRAVENOUS; SUBCUTANEOUS at 18:58

## 2022-05-20 RX ADMIN — SODIUM CHLORIDE AND POTASSIUM CHLORIDE 150 ML/HR: .9; .15 SOLUTION INTRAVENOUS at 11:37

## 2022-05-20 RX ADMIN — FENTANYL CITRATE 25 MCG: 50 INJECTION, SOLUTION INTRAMUSCULAR; INTRAVENOUS at 08:42

## 2022-05-20 RX ADMIN — POTASSIUM CHLORIDE 20 MEQ: 14.9 INJECTION, SOLUTION INTRAVENOUS at 12:54

## 2022-05-20 RX ADMIN — HYDROMORPHONE HYDROCHLORIDE 0.5 MG: 1 INJECTION, SOLUTION INTRAMUSCULAR; INTRAVENOUS; SUBCUTANEOUS at 23:53

## 2022-05-20 RX ADMIN — HEPARIN SODIUM 5000 UNITS: 5000 INJECTION INTRAVENOUS; SUBCUTANEOUS at 21:01

## 2022-05-20 RX ADMIN — LIDOCAINE 5% 2 PATCH: 700 PATCH TOPICAL at 09:28

## 2022-05-20 RX ADMIN — MAGNESIUM SULFATE HEPTAHYDRATE 2 G: 40 INJECTION, SOLUTION INTRAVENOUS at 08:53

## 2022-05-20 NOTE — ASSESSMENT & PLAN NOTE
· Outpatient surveillance imaging with Gastroenterology  · Check a CA 19-9 level    CT scan of the chest/abdomen/pelvis (05/20/2022):  PANCREAS:  Limited evaluation without IV contrast   Calcifications in the pancreatic head and proximal body, indicative of chronic pancreatitis  3 0 x 3 2 cm cyst in the pancreatic head, measuring 3 4 x 3 9 cm on the CT from 10/30/2021

## 2022-05-20 NOTE — ASSESSMENT & PLAN NOTE
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 also secondary to chronic alcohol abuse  · Also with a component of hypovolemic hyponatremia with nausea and a decreased PO intake over the last few days  · Give a NSS IV fluids with 20 mEq KCl at 150 ml/hr for a total of 1000 ml  · Follow the sodium level with a BMP every 6 hours  · Will then need a fluid restriction once he is adequately volume resuscitated  · Check a urine sodium level and urine osmolality  · Check a serum osmolality and TSH level      Results from last 7 days   Lab Units 05/20/22  0824   SODIUM mmol/L 120*   POTASSIUM mmol/L 2 3*   CHLORIDE mmol/L 81*   CO2 mmol/L 29   BUN mg/dL 10   CREATININE mg/dL 1 07   CALCIUM mg/dL 7 5*

## 2022-05-20 NOTE — ASSESSMENT & PLAN NOTE
· Rib fracture protocol    CT scan of the chest/abdomen/pelvis (05/20/2022): Several healed right-sided rib fractures  Recent appearing fractures of the posterior right 9th and 10th ribs at their costovertebral junctions

## 2022-05-20 NOTE — ASSESSMENT & PLAN NOTE
· Outpatient Thoracic Surgery evaluation    CT scan of the chest/abdomen/pelvis (05/20/2022): Mild ectasia of the ascending aorta, measuring up to 4 0 cm

## 2022-05-20 NOTE — RESPIRATORY THERAPY NOTE
RT Protocol Note  Seema Ryan 55 y o  male MRN: [id number]  Unit/Bed#: 514-91 Encounter: [id number]    Assessment    Principal Problem:    Hyponatremia  Active Problems:    Tobacco abuse    Hepatic steatosis    Hypomagnesemia    Vitamin D deficiency    Bladder wall thickening    Hypokalemia    Microcytic anemia    Thrombocytopenia (HCC)    Abnormal EKG    Pancreatic pseudocyst    Alcohol use    Thoracic compression fracture (HCC)    Aortic ectasia (HCC)    Rib fractures    Seizure disorder (HCC)    Hypoxia    Traore's esophagus      Home Pulmonary Medications:  Albuterol MDI and udn PRN- used MDI more than udn       Past Medical History:   Diagnosis Date    Alcohol abuse     Traore esophagus     Bowel obstruction (HCC)     Bowel perforation (HCC)     Cardiac disease     Continuous chronic alcoholism ([facility] [address] ) 10/5/2017    COPD (chronic obstructive pulmonary disease) (HCC)     History of shoulder surgery     Right shoulder    History of transfusion     Hx of cervical spine surgery     Hypertension     Incisional hernia 10/8/2017    MI, old     Mitral regurgitation     Psychiatric disorder     Seizures ([facility] [address] )      Social History     Socioeconomic History    Marital status: Single     Spouse name: None    Number of children: None    Years of education: None    Highest education level: None   Occupational History    None   Tobacco Use    Smoking status: Current Every Day Smoker     Packs/day: 3 00     Years: 35 00     Pack years: 105 00     Types: Cigarettes    Smokeless tobacco: Never Used   Vaping Use    Vaping Use: Former   Substance and Sexual Activity    Alcohol use:  Yes     Alcohol/week: 42 0 standard drinks     Types: 42 Cans of beer per week     Comment: a six pack a day    Drug use: No    Sexual activity: Not Currently     Partners: Female   Other Topics Concern    None   Social History Narrative    None     Social Determinants of Health     Financial Resource Strain: Not on file Food Insecurity: No Food Insecurity    Worried About Running Out of Food in the Last Year: Never true    Kevin of Food in the Last Year: Never true   Transportation Needs: No Transportation Needs    Lack of Transportation (Medical): No    Lack of Transportation (Non-Medical): No   Physical Activity: Not on file   Stress: Not on file   Social Connections: Not on file   Intimate Partner Violence: Not on file   Housing Stability: Low Risk     Unable to Pay for Housing in the Last Year: No    Number of Places Lived in the Last Year: 1    Unstable Housing in the Last Year: No       Subjective         Objective    Physical Exam:   Assessment Type: Assess only  General Appearance: Awake  Respiratory Pattern: Normal  Chest Assessment: Chest expansion symmetrical  Bilateral Breath Sounds: Diminished, Coarse  O2 Device: 3L NC    Vitals:  Blood pressure 111/73, pulse 76, temperature 97 8 °F (36 6 °C), resp  rate 16, height 5' 6" (1 676 m), weight 55 3 kg (122 lb), SpO2 93 %  Imaging and other studies: I have personally reviewed pertinent reports  O2 Device: 3L NC     Plan    Respiratory Plan: Home Bronchodilator Patient pathway  Airway Clearance Plan: Incentive Spirometer, Flutter       Prn ordered by physician, no further medication added at current time  IS goal 957cc per Rib fracture protocol- Pt currently achieving 1000+cc

## 2022-05-20 NOTE — ASSESSMENT & PLAN NOTE
· Likely due to liver disease  · Monitor for any signs of bleeding  · Follow the platelet count  · Outpatient Hematology/Oncology evaluation

## 2022-05-20 NOTE — H&P
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 and also secondary to chronic alcohol abuse  · Also with a component of hypovolemic hyponatremia with nausea and a decreased PO intake over the last few days  · Give a NSS IV fluids with 20 mEq KCl at 150 ml/hr for a total of 1000 ml  · Follow the sodium level with a BMP every 6 hours  · Will then need a fluid restriction once he is adequately volume resuscitated  · Check a urine sodium level and urine osmolality  · Check a serum osmolality and TSH level      Results from last 7 days   Lab Units 05/20/22  0824   SODIUM mmol/L 120*   POTASSIUM mmol/L 2 3*   CHLORIDE mmol/L 81*   CO2 mmol/L 29   BUN mg/dL 10   CREATININE mg/dL 1 07   CALCIUM mg/dL 7 5*         Hypoxia  Assessment & Plan  · Likely due to rib fractures  · Currently requiring 3 lpm of continuous supplemental oxygen to maintain oxygen saturation levels at 92% and above  · Rib fracture protocol  · Incentive spirometry    Hypomagnesemia  Assessment & Plan  · Give a total of 4 grams of IV magnesium sulfate on 05/20/2022  · Follow the magnesium level    Results from last 7 days   Lab Units 05/20/22  0824   MAGNESIUM mg/dL 1 1*         Alcohol use  Assessment & Plan  · Utilize the CIWA protocol  · Utilize folic acid 1 mg IV Qdaily and thiamine 100 mg IV Qdaily  · Daily PO multivitamin    Thrombocytopenia ([address] )  Assessment & Plan  · Likely due to liver disease  · Monitor for any signs of bleeding  · Follow the platelet count  · Outpatient Hematology/Oncology evaluation    Traore's esophagus  Assessment & Plan  · Continue PPI therapy  · Outpatient surveillance EGD's with Gastroenterology    Seizure disorder [facility])  Assessment & Plan  · Continue PO keppra  · Outpatient follow-up with Neurology    Rib fractures  Assessment & Plan  · Rib fracture protocol    CT scan of the chest/abdomen/pelvis (05/20/2022): Several healed right-sided rib fractures  Recent appearing fractures of the posterior right 9th and 10th ribs at their costovertebral junctions  Aortic ectasia [facility])  Assessment & Plan  · Outpatient Thoracic Surgery evaluation    CT scan of the chest/abdomen/pelvis (05/20/2022): Mild ectasia of the ascending aorta, measuring up to 4 0 cm  Thoracic compression fracture [facility])  Assessment & Plan  · Multiple compression fractures  · Needs an outpatient DEXA scan with his PCP  · PT/OT    CT scan of the chest/abdomen/pelvis (05/20/2022): IMPRESSION:     1  Chronic compression fractures of T2, T3, T6 and T7      2   Mild compression fractures of T8 4 and T8, age indeterminant, although developing since 10/30/2021  Pancreatic pseudocyst  Assessment & Plan  · Outpatient surveillance imaging with Gastroenterology  · Check a CA 19-9 level    CT scan of the chest/abdomen/pelvis (05/20/2022):  PANCREAS:  Limited evaluation without IV contrast   Calcifications in the pancreatic head and proximal body, indicative of chronic pancreatitis  3 0 x 3 2 cm cyst in the pancreatic head, measuring 3 4 x 3 9 cm on the CT from 10/30/2021      Microcytic anemia  Assessment & Plan  · Check an iron panel, vitamin B12 level, and folate level  · Check the patient's stool for occult blood x 3 specimens  · Follow the CBC  · Transfuse for a hemoglobin less than 7 g/dl    Results from last 7 days   Lab Units 05/20/22  0824   WBC Thousand/uL 5 22   HEMOGLOBIN g/dL 8 5*   HEMATOCRIT % 26 3*   PLATELETS Thousands/uL 86*         Hypokalemia  Assessment & Plan  · Replete with PO and IV potassium chloride supplementation treatment    Bladder wall thickening  Assessment & Plan  · Check a urinalysis and urine culture    Vitamin D deficiency  Assessment & Plan  · Continue cholecalciferol supplementation    Hepatic steatosis  Assessment & Plan  · Due to alcohol abuse  · Avoid all hepatotoxic agents  · Outpatient surveillance imaging with Gastroenterology    Tobacco abuse  Assessment & Plan  · Nicotine patch  · Smoking cessation counseling      VTE Pharmacologic Prophylaxis: VTE Score: 3 High Risk (Score >/= 5) - Pharmacological DVT Prophylaxis Contraindicated  Sequential Compression Devices Ordered  Pharmacological DVT prophylaxis is contraindicated due to thrombocytopenia  Code Status: Level 1 - Full Code    Anticipated Length of Stay: Patient will be admitted on an inpatient basis with an anticipated length of stay of greater than 2 midnights secondary to the need for treatment of the hyponatremia with continuous IV fluids and for serial laboratory testing to closely monitor his sodium level  He also needs a Nephrology evaluation as well as PT/OT evaluations  Total Time for Visit, including Counseling / Coordination of Care: 60 minutes Greater than 50% of this total time spent on direct patient counseling and coordination of care  Chief Complaint:  Multiple falls    History of Present Illness:  Funmilayo Durand is a 55 y o  male who presents to the ED with the complaint of experiencing multiple falls over the last few days  The patient did experience head trauma with one of the falls, which resulted in loss of consciousness  The patient has been experiencing dizziness and lightheadedness resulting in gait instability and ambulatory dysfunction  The dizziness is severe in intensity and constant in nature  Associated symptoms include nausea, a visual disturbance described as blurry vision, a headache, generalized weakness, chest pain, and back pain    Nothing seemed to improve his symptoms  Review of Systems:  Review of Systems:  Per HPI, all other systems have been reviewed and were negative  Past Medical and Surgical History:   Past Medical History:   Diagnosis Date    Alcohol abuse     Traore esophagus     Bowel obstruction (HCC)     Bowel perforation (HCC)     Cardiac disease     Continuous chronic alcoholism ([facility] ) 10/5/2017    COPD (chronic obstructive pulmonary disease) (HCC)     History of shoulder surgery     Right shoulder    History of transfusion     Hx of cervical spine surgery     Hypertension     Incisional hernia 10/8/2017    MI, old     Mitral regurgitation     Psychiatric disorder     Seizures ([facility] )        Past Surgical History:   Procedure Laterality Date    APPENDECTOMY      BACK SURGERY      CHOLECYSTECTOMY      ESOPHAGOGASTRODUODENOSCOPY N/A 11/28/2016    Procedure: ESOPHAGOGASTRODUODENOSCOPY (EGD); Surgeon: Wellington Mora MD;  Location:  GI LAB; Service:     GALLBLADDER SURGERY      LAPAROTOMY N/A 10/25/2016    Procedure: LAPAROTOMY EXPLORATORY;  Surgeon: Elaine Sanchez MD;  Location: [facility];  Service:    Annamaria Drop MOUTH SURGERY      PERCUTANEOUS PINNING FEMORAL NECK FRACTURE      SHOULDER SURGERY Right     SHOULDER SURGERY      SMALL INTESTINE SURGERY      STOMACH SURGERY      bal surgery       Meds/Allergies:  Prior to Admission medications    Medication Sig Start Date End Date Taking?  Authorizing Provider   albuterol (2 5 mg/3 mL) 0 083 % nebulizer solution Take 2 5 mg by nebulization every 6 (six) hours as needed for wheezing or shortness of breath    Historical Provider, MD   albuterol (PROVENTIL HFA,VENTOLIN HFA) 90 mcg/act inhaler Inhale 2 puffs every 4 (four) hours as needed for wheezing or shortness of breath 4/22/22   [facility] LORA Figueroa   amitriptyline (ELAVIL) 25 mg tablet Take 1 tablet (25 mg total) by mouth daily at bedtime as needed for sleep 6/13/19   Shawn Valencia PA-C   Cholecalciferol 25 MCG (1000 UT) tablet Take 1 tablet (1,000 Units total) by mouth daily 8/29/20   Telly Salinas DO   cyanocobalamin (VITAMIN B-12) 100 mcg tablet Take 1 tablet (100 mcg total) by mouth daily 7/17/18   Silvia Webber PA-C   dextromethorphan-guaifenesin [facility] DM)  MG per 12 hr tablet Take 1 tablet by mouth every 12 (twelve) hours 5/11/21   Andre Gonzalez PA-C   docusate sodium (COLACE) 100 mg capsule Take 1 capsule (100 mg total) by mouth 2 (two) times a day as needed for constipation 8/29/20   Telly Salinas DO   ergocalciferol (VITAMIN D2) 50,000 units Take 1 capsule (50,000 Units total) by mouth once a week 5/10/22   LORA Lockwood   esomeprazole (NexIUM) 40 MG capsule Take 1 capsule (40 mg total) by mouth 2 (two) times a day before meals 10/28/20   Grace Negro PA-C   folic acid (FOLVITE) 1 mg tablet Take 1 tablet (1 mg total) by mouth daily 8/29/20   Telly Salinas DO   gabapentin (NEURONTIN) 100 mg capsule Take 1 capsule (100 mg total) by mouth 3 (three) times a day 11/4/19   Silvia Webber PA-C   levETIRAcetam (KEPPRA) 1000 MG tablet Take 1 tablet (1,000 mg total) by mouth every 12 (twelve) hours 4/22/22   LORA Greco   lisinopril (ZESTRIL) 10 mg tablet Take 1 tablet (10 mg total) by mouth daily 9/20/19   Silvia Webber PA-C   Multiple Vitamin (TAB-A-BONI PO) Take 1 tablet by mouth daily    Historical Provider, MD   pancrelipase, Lip-Prot-Amyl, (CREON) 6,000 units delayed release capsule Take 6,000 units of lipase by mouth 3 (three) times a day with meals 10/28/20   Grace Negro PA-C   polyethylene glycol (GOLYTELY) 4000 mL solution Take 4,000 mL by mouth once for 1 dose 3/9/21 3/9/21  Hero Thurman MD   thiamine 100 MG tablet Take 1 tablet (100 mg total) by mouth daily 8/29/20   Violet Abdul DO     I have reviewed home medications with patient personally      Allergies: No Known Allergies    Social History:  Marital Status: Single     Substance Use History:   Social History     Substance and Sexual Activity   Alcohol Use Yes    Alcohol/week: 6 0 standard drinks    Types: 6 Cans of beer per week    Comment: 4     Social History     Tobacco Use   Smoking Status Current Every Day Smoker    Packs/day: 1 00    Years: 15 00    Pack years: 15 00    Types: Cigarettes   Smokeless Tobacco Never Used     Social History     Substance and Sexual Activity   Drug Use No       Family History:  Non-contributory    Physical Exam:     Vitals:   Blood Pressure: 108/68 (05/20/22 1045)  Pulse: 95 (05/20/22 1045)  Temperature: 98 1 °F (36 7 °C) (05/20/22 0750)  Temp Source: Temporal (05/20/22 0750)  Respirations: 20 (05/20/22 1045)  Weight - Scale: 55 3 kg (122 lb) (05/20/22 0750)  SpO2: 98 % (05/20/22 1045)    Physical Exam   General:  NAD, follows commands  HEENT:  NC/AT, mucous membranes moist  Neck:  Supple, No JVP elevation  CV:  + S1, + S2, RRR  Pulm:  Lung fields are CTA bilaterally  Abd:  Soft, Non-tender, Non-distended  Ext:  No clubbing/cyanosis/edema  Skin:  No rashes  Neuro:  Awake, alert, oriented  Psych:  Normal mood and affect      Additional Data:     Lab Results:  Results from last 7 days   Lab Units 05/20/22  0824   WBC Thousand/uL 5 22   HEMOGLOBIN g/dL 8 5*   HEMATOCRIT % 26 3*   PLATELETS Thousands/uL 86*   NEUTROS PCT % 76*   LYMPHS PCT % 16   MONOS PCT % 7   EOS PCT % 0     Results from last 7 days   Lab Units 05/20/22  0824   SODIUM mmol/L 120*   POTASSIUM mmol/L 2 3*   CHLORIDE mmol/L 81*   CO2 mmol/L 29   BUN mg/dL 10   CREATININE mg/dL 1 07   ANION GAP mmol/L 10   CALCIUM mg/dL 7 5*   ALBUMIN g/dL 2 9*   TOTAL BILIRUBIN mg/dL 0 89   ALK PHOS U/L 120*   ALT U/L 43   AST U/L 101*   GLUCOSE RANDOM mg/dL 91     Results from last 7 days   Lab Units 05/20/22  0824   INR  0 98                   Imaging: Reviewed radiology reports from this admission including: chest CT scan, abdominal/pelvic CT, CT head and CT scan of the cervical spine and thoracolumbar spine  TRAUMA - CT head wo contrast   Final Result by Abdoulaye Esquivel MD (05/20 9929)      No acute intracranial abnormality  Microangiopathic changes  Moderate diffuse parenchymal volume loss  Pansinus mucosal thickening  The study was marked in [facility] for immediate notification  Workstation performed: [id number]         TRAUMA - CT spine cervical wo contrast   Final Result by Abdoulaye Esquivel MD (05/20 6124)      No cervical spine fracture or traumatic malalignment  Unchanged fracture of the right-sided screw at T1  Hardware is unchanged in appearance  Multilevel degenerative change of the spine  Workstation performed: [id number]         CT chest abdomen pelvis wo contrast   Final Result by Yolanda Roy MD (05/70 5391)      1  Limited examination due to absence of IV contrast    2      3   Mild ectasia of the ascending aorta, measuring up to 4 0 cm       4   Lingular subsegmental atelectasis  5   Hepatic steatosis  6   Persistent pseudocyst in the pancreatic head, decreased in size since a CT from 10/30/2021       7   Nonspecific mild bladder wall thickening  8   No evidence of acute injury in the chest, abdomen or pelvis  9   Chronic compression fractures of T6 and T7  Healed fracture of the left inferior pubic ramus and several healed right-sided rib fractures  10   Mild compression fracture of T8, involving the inferior endplate, age indeterminate although developing since 10/30/2021  11   Recent appearing fractures of the right 9th and 10th ribs at their costovertebral junctions  The study was marked in [facility] for immediate notification  Workstation performed: [id number]         CT recon only thoracolumbar (no charge)   Final Result by Yolanda Roy MD (05/20 1008)      1    Chronic compression fractures of T2, T3, T6 and T7       2   Mild compression fractures of T8 4 and T8, age indeterminant, although developing since 10/30/2021       3   Chronic, healed fractures of the posterior portions of several ribs, bilaterally  4   Recent appearing fracture of the left sacral ala  5   Degenerative changes at several thoracic and lumbar levels  The study was marked in [facility] for immediate notification  Workstation performed: [id number]         XR Trauma chest portable   ED Interpretation by Sidney Mullins DO (05/20 8801)   Airway is midline  Lungs are clear bilaterally with no evidence of pulmonary vascular congestion/focal infiltrate/pleural effusion//pneumothorax  Cardiac and mediastinal silhouettes are within normal limits   Osseous structures appear normal             EKG and Other Studies Reviewed on Admission:   · EKG:  ECG 12 lead  Order: [id number]   Status: Final result     Visible to patient: No (inaccessible in [facility] MyChart)     Next appt: 05/24/2022 at 08:00 AM in Physical Therapy [facility], PT)     0 Result Notes    Component Ref Range & Units 5/20/22 0752 11/22/20 1358 8/20/20 1730 7/21/20 1149 11/12/19 0610 9/13/19 1437 1/21/19 0805   Ventricular Rate BPM 90  90  86  90  81  70  85    Atrial Rate BPM 90  90  86  90  81  70  85    TX Interval ms 154  154  154  154  158  170  152    QRSD Interval ms 94  100  94  102  96  100  92    QT Interval ms 408  388  396  376  384  438  406    QTC Interval ms 499  474  473  459  446  473  483    P Axis degrees 75  62  72  72  74  70  70    QRS Axis degrees -47  -59  -50  -57  -50  40  -40    T Wave Axis degrees 88  40  54  69  48  49  40              Narrative & Impression    Normal sinus rhythm  Left anterior fascicular block  Nonspecific T wave abnormality  Abnormal ECG  When compared with ECG of 22-NOV-2020 13:58,  Criteria for Septal infarct are no longer Present  ST now depressed in Anterior leads  Nonspecific T wave abnormality, worse in Lateral leads  Confirmed by Balwinder Swift ([id number]) on 5/20/2022 8:12:29 AM               ** Please Note: This note has been constructed using a voice recognition system   **

## 2022-05-20 NOTE — CASE MANAGEMENT
Case Management Assessment & Discharge Planning Note    Patient name Lorna Stapler  Location [address] [id number] MRN [id number]  : 1965 Date 2022       Current Admission Date: 2022  Current Admission Diagnosis:Hyponatremia   Patient Active Problem List    Diagnosis Date Noted    Alcohol use 2022    Thoracic compression fracture ([address] ) 2022    Aortic ectasia [facility] ) 2022    Rib fractures 2022    Seizure disorder ([facility] ) 2022    Hypoxia 2022    Traore's esophagus 2022    Ambulatory dysfunction 2021    Current every day smoker 2021    Fall 2021    Hx of duodenal ulcer 2021    Neck pain 2021    Acute on chronic pancreatitis ([address] ) 2020    Pancreatic pseudocyst 2020    COPD (chronic obstructive pulmonary disease) ([facility] ) 2020    Ileus ([facility] ) 2020    Abnormal EKG 2020    Lesion of spleen 2020    Left anterior fascicular block 2020    Constipation 2020    Transaminitis 2020    Celiac artery stenosis ([facility] ) 2020    Pancreatic mass 2020    Pancytopenia ([facility] ) 2020    Traore esophagus     Duodenal ulcer 2019    Tubular adenoma 2019    Iron deficiency anemia due to chronic blood loss 10/22/2019    Closed fracture of left olecranon process, initial encounter 2019    Thrombocytopenia ([address] )     Alcoholic cirrhosis of liver without ascites ([address] ) 2018    Seizure-like activity ([facility] ) 2018    Diarrhea 2018    Abnormality of pancreatic duct 2017    Hx of seizure disorder 10/12/2017    Incisional hernia 10/08/2017    Continuous chronic alcoholism ([address] ) 10/05/2017    T6 vertebral fracture ([facility] ) 2017    Neuropathy involving both lower extremities 08/15/2017    Hypokalemia 2017    Mal[facility] of moderate degree ([facility] ) 2017    Generalized weakness 2017    Bladder wall thickening 03/09/2017    Urinary retention 61/11/5167    Alcoholic hepatitis without ascites 03/07/2017    Vitamin D deficiency 03/07/2017    Iron deficiency 03/07/2017    Depression 02/08/2017    Elevated alkaline phosphatase level 01/14/2017    Hepatomegaly 01/12/2017    Hepatic steatosis 01/12/2017    Hypomagnesemia 01/12/2017    Hyponatremia 01/11/2017    Dietary folate deficiency anemia 01/11/2017    Ventral hernia without obstruction or gangrene 01/11/2017    Abdominal wound dehiscence 12/15/2016    Microcytic anemia 06/20/2016    Allergic rhinitis 06/07/2016    Tobacco abuse 05/22/2016    Essential hypertension 05/22/2016    H/O suicide attempt 05/22/2016    H/O cervical spine surgery 05/22/2016    Left foot drop 10/31/2014    Peripheral neuropathy 10/31/2014    Cervical disc disorder with myelopathy 09/25/2014    Lumbar radiculopathy 09/25/2014    Cervical spinal stenosis 07/30/2014      LOS (days): 0  Geometric Mean LOS (GMLOS) (days): 2 60  Days to GMLOS:2 4     OBJECTIVE:    Risk of Unplanned Readmission Score: 14 86         Current admission status: Inpatient       Preferred Pharmacy:   [facility] - [address] [facility]    [address] [place] [address] [facility]  Phone: [phone] Fax: [phone]    Primary Care Provider: Charlee Runner, CRNP    Primary Insurance: [facility]  Secondary Insurance:     ASSESSMENT:   [facility], [address] [place] [address]  Representative - Brother   Primary Phone: [phone] (Mobile)  Home Phone: [phone]               Advance Directives  Does patient have a [address]?: No  Was patient offered paperwork?: Yes (declines)  Does patient currently have a Health Care decision maker?: Yes, please see Health Care Proxy section  Does patient have Advance Directives?: Yes  Advance Directives: Living will  Primary Contact: brother:Neptali [phone]         Readmission Root Cause  30 Day Readmission: No    Patient Information  Admitted from[de-identified] Home  Mental Status: Alert  During Assessment patient was accompanied by: Not accompanied during assessment  Assessment information provided by[de-identified] Patient  Primary Caregiver: Self  Support Systems: Family members (brother, sister)  [place] of Residence: [facility] do you live in?: [address] entry access options   Select all that apply : Stairs  Number of steps to enter home : One Flight (12)  Type of Current Residence: 2 story home  Upon entering residence, is there a bedroom on the main floor (no further steps)?: Yes (sleeps on the couch on the 1st floor)  Upon entering residence, is there a bathroom on the main floor (no further steps)?: Yes  In the last 12 months, was there a time when you were not able to pay the mortgage or rent on time?: No  In the last 12 months, how many places have you lived?: 1  In the last 12 months, was there a time when you did not have a steady place to sleep or slept in a shelter (including now)?: No  Homeless/housing insecurity resource given?: N/A  Living Arrangements: Lives w/ Extended Family (with his brother)    Activities of Daily Living Prior to Admission  Functional Status: Independent  Completes ADLs independently?: Yes  Ambulates independently?: Yes  Does patient use assisted devices?: Yes  Assisted Devices (DME) used: Straight Cane  Does patient currently own DME?: Yes  What DME does the patient currently own?: Straight Cane  Does patient have a history of Outpatient Therapy (PT/OT)?: Yes ([facility] [place])  Does the patient have a history of Short-Term Rehab?: No  Does patient have a history of HHC?: No  Does patient currently have [address]?: No         Patient Information Continued  Does patient have prescription coverage?: Yes  Within the past 12 months, you worried that your food would run out before you got the money to buy more : Never true  Within the past 12 months, the food you bought just didn't last and you didn't have money to get more : Never true  Food insecurity resource given?: N/A  Does patient receive dialysis treatments?: No  Does patient have a history of substance abuse?: Yes  Historical substance use preference: Alcohol/ETOH  History of Withdrawal Symptoms: Denies past symptoms  Is patient currently in treatment for substance abuse?: No  Patient declined treatment information  Does patient have a history of Mental Health Diagnosis?: No     pt states he drinks a 6 pack every day; also smokes 3 packs of cigarettes a day  Pt not interested in inpt or OP Drug and Alcohol  Means of Transportation  Means of Transport to Appts[de-identified] Family transport (brother)  In the past 12 months, has lack of transportation kept you from medical appointments or from getting medications?: No  In the past 12 months, has lack of transportation kept you from meetings, work, or from getting things needed for daily living?: No  Was application for public transport provided?: N/A        DISCHARGE DETAILS:    Discharge planning discussed with[de-identified] patient        CM contacted family/caregiver?: No- see comments (declines)  Were Treatment Team discharge recommendations reviewed with patient/caregiver?: Yes  Did patient/caregiver verbalize understanding of patient care needs?: Yes  Were patient/caregiver advised of the risks associated with not following Treatment Team discharge recommendations?: Yes            Discharge Destination Plan[de-identified] Home  Transport at Discharge : Family         Pt plans home on dc with OP follow up after dc  Declining any D&A information (inpt or OP)  Pt's brother to transport him home on dc  CM will follow and assist in dc planning

## 2022-05-20 NOTE — ASSESSMENT & PLAN NOTE
· Multiple compression fractures  · Needs an outpatient DEXA scan with his PCP  · PT/OT    CT scan of the chest/abdomen/pelvis (05/20/2022): IMPRESSION:     1  Chronic compression fractures of T2, T3, T6 and T7      2   Mild compression fractures of T8 4 and T8, age indeterminant, although developing since 10/30/2021

## 2022-05-20 NOTE — ASSESSMENT & PLAN NOTE
· Give a total of 4 grams of IV magnesium sulfate on 05/20/2022  · Follow the magnesium level    Results from last 7 days   Lab Units 05/20/22  0824   MAGNESIUM mg/dL 1 1*

## 2022-05-20 NOTE — ED PROVIDER NOTES
Emergency Department Trauma Note  Berna Martínez 55 y o  male MRN: [id number]  Unit/Bed#: /422-01 Encounter: [id number]      Trauma Alert: Trauma Acuity: Trauma Evaluation  Model of Arrival:   via    Trauma Team: Current Providers  Attending Provider: Faizan Casey DO  Attending Provider: Natalia Munoz DO  Registered Nurse: Mary Minor, RN  Consulting Physician: Kyrie Padilla DO  Registered Nurse: Archana Chacko RN  Patient Care Assistant: Ezzard Hammans  Consultants:     None      History of Present Illness     Chief Complaint:   Chief Complaint   Patient presents with   Linda Rayael Fall     Patient reports "Losing balance and falling 6 times in 30min time two days ago  Since has had dizziness/blurred vision/headaches"     HPI:  Berna Martínez is a 55 y o  male who presents from home stating that he has had binocular blurred vision/diffuse headache and dizzy/lightheaded sensation since falling and striking his head two days ago  He states that he fell 6 times in total that day, becoming lightheaded upon standing each time and falling to the ground a number of times  He struck his head at least once  He did lose consciousness as he was standing on one occasion prior to falling  Since that time he is also reporting pain everywhere," which he further characterizes as extending from his neck to his hips  He notes particular pain in the area of his sacrum and his midback  He has been ambulatory despite the pain  Symptoms have worsened over the past two days which prompted his ED evaluation now  Prior to the falls, he stated that he was not feeling unwell or any different from his normal state of health  States he does not fall with any frequency  He was notably rhonchorous during examination but he states this is due to his COPD and the fact that he smoked prior to coming into the ED  He denies any chest pain per se or dyspnea  Denies any abdominal pain per se    He does not have any new extremity paresthesias or weakness  He has distal bilateral upper extremity paresthesias that are due to existing neuropathy  No anticoagulant or antiplatelet use  A/P:  Multiple falls related to orthostatic type symptoms now with multiple areas of sx after the fall  Will obtain CT head/C-spine/chest abdomen pelvis with T/L-spine recons  Concurrent workup for etiology of lightheadedness/recurrent falls  Disposition pending  Mechanism:Details of Incident: Patient reports "Losing balance and falling 6 times in 30min time two days ago  Since has had dizziness/blurred vision/headaches"  Injury Date: 05/18/22 Injury Time: 1200 Injury Occurence Location - [address] [place] [address] Way: [facility]      History provided by:  Medical records and patient    Review of Systems   Constitutional: Negative for chills and fever  Eyes: Positive for photophobia and visual disturbance (binocular blurred vision)  Respiratory: Negative for cough and shortness of breath  Cardiovascular: Negative for chest pain and palpitations  Gastrointestinal: Negative for abdominal pain, nausea and vomiting  Genitourinary: Negative for flank pain  Musculoskeletal: Positive for arthralgias, back pain, myalgias and neck pain  Skin: Negative for color change, pallor, rash and wound  Neurological: Positive for syncope, light-headedness and headaches  Negative for dizziness, speech difficulty, weakness and numbness  Psychiatric/Behavioral: Negative for confusion  The patient is not nervous/anxious  All other systems reviewed and are negative        Historical Information     Immunizations:   Immunization History   Administered Date(s) Administered    Influenza, recombinant, quadrivalent,injectable, preservative free 12/02/2020    Tdap 07/28/2017       Past Medical History:   Diagnosis Date    Alcohol abuse     Traore esophagus     Bowel obstruction (HCC)     Bowel perforation (HCC)     Cardiac disease     Continuous chronic alcoholism ([facility] ) 10/5/2017    COPD (chronic obstructive pulmonary disease) (HCC)     History of shoulder surgery     Right shoulder    History of transfusion     Hx of cervical spine surgery     Hypertension     Incisional hernia 10/8/2017    MI, old     Mitral regurgitation     Psychiatric disorder     Seizures ([facility] )        Family History   Problem Relation Age of Onset    Breast cancer Mother     Prostate cancer Father     Skin cancer Brother      Past Surgical History:   Procedure Laterality Date    APPENDECTOMY      BACK SURGERY      CHOLECYSTECTOMY      ESOPHAGOGASTRODUODENOSCOPY N/A 11/28/2016    Procedure: ESOPHAGOGASTRODUODENOSCOPY (EGD); Surgeon: Radhames Moya MD;  Location:  GI LAB; Service:     GALLBLADDER SURGERY      LAPAROTOMY N/A 10/25/2016    Procedure: LAPAROTOMY EXPLORATORY;  Surgeon: Ramona Reyes MD;  Location: [facility];  Service:    [facility] MOUTH SURGERY      PERCUTANEOUS PINNING FEMORAL NECK FRACTURE      SHOULDER SURGERY Right     SHOULDER SURGERY      SMALL INTESTINE SURGERY      STOMACH SURGERY      bal surgery     Social History     Tobacco Use    Smoking status: Current Every Day Smoker     Packs/day: 3 00     Years: 35 00     Pack years: 105 00     Types: Cigarettes    Smokeless tobacco: Never Used   Vaping Use    Vaping Use: Former   Substance Use Topics    Alcohol use: Yes     Alcohol/week: 42 0 standard drinks     Types: 42 Cans of beer per week     Comment: a six pack a day    Drug use: No     E-Cigarette/Vaping    E-Cigarette Use Former User      E-Cigarette/Vaping Substances    Nicotine No     THC No     CBD No     Flavoring No     Other No     Unknown No        Family History: non-contributory    Meds/Allergies   Prior to Admission Medications   Prescriptions Last Dose Informant Patient Reported? Taking?    Cholecalciferol 25 MCG (1000 UT) tablet 5/19/2022 at Unknown time Self No Yes   Sig: Take 1 tablet (1,000 Units total) by mouth daily Multiple Vitamin (TAB-A-BONI PO) 5/19/2022 at Unknown time Self Yes Yes   Sig: Take 1 tablet by mouth daily   albuterol (2 5 mg/3 mL) 0 083 % nebulizer solution More than a month at Unknown time Self Yes No   Sig: Take 2 5 mg by nebulization every 6 (six) hours as needed for wheezing or shortness of breath   albuterol (PROVENTIL HFA,VENTOLIN HFA) 90 mcg/act inhaler 5/19/2022 at Unknown time  No Yes   Sig: Inhale 2 puffs every 4 (four) hours as needed for wheezing or shortness of breath   amitriptyline (ELAVIL) 25 mg tablet Not Taking at Unknown time Self No No   Sig: Take 1 tablet (25 mg total) by mouth daily at bedtime as needed for sleep   Patient not taking: Reported on 5/20/2022   cyanocobalamin (VITAMIN B-12) 100 mcg tablet Unknown at Unknown time Self No No   Sig: Take 1 tablet (100 mcg total) by mouth daily   dextromethorphan-guaifenesin (MUCINEX DM)  MG per 12 hr tablet Past Month at Unknown time  No Yes   Sig: Take 1 tablet by mouth every 12 (twelve) hours   docusate sodium (COLACE) 100 mg capsule Not Taking at Unknown time Self No No   Sig: Take 1 capsule (100 mg total) by mouth 2 (two) times a day as needed for constipation   Patient not taking: Reported on 5/20/2022   ergocalciferol (VITAMIN D2) 50,000 units Past Week at Unknown time  No Yes   Sig: Take 1 capsule (50,000 Units total) by mouth once a week   esomeprazole (NexIUM) 40 MG capsule 5/19/2022 at Unknown time  No Yes   Sig: Take 1 capsule (40 mg total) by mouth 2 (two) times a day before meals   folic acid (FOLVITE) 1 mg tablet 5/19/2022 at Unknown time Self No Yes   Sig: Take 1 tablet (1 mg total) by mouth daily   gabapentin (NEURONTIN) 100 mg capsule Not Taking at Unknown time Self No No   Sig: Take 1 capsule (100 mg total) by mouth 3 (three) times a day   Patient not taking: Reported on 5/20/2022   levETIRAcetam (KEPPRA) 1000 MG tablet 5/20/2022 at Unknown time  No Yes   Sig: Take 1 tablet (1,000 mg total) by mouth every 12 (twelve) hours   lisinopril (ZESTRIL) 10 mg tablet 5/20/2022 at Unknown time Self No Yes   Sig: Take 1 tablet (10 mg total) by mouth daily   pancrelipase, Lip-Prot-Amyl, (CREON) 6,000 units delayed release capsule 5/19/2022 at Unknown time  No Yes   Sig: Take 6,000 units of lipase by mouth 3 (three) times a day with meals   polyethylene glycol (GOLYTELY) 4000 mL solution   No No   Sig: Take 4,000 mL by mouth once for 1 dose   thiamine 100 MG tablet 5/19/2022 at Unknown time Self No Yes   Sig: Take 1 tablet (100 mg total) by mouth daily      Facility-Administered Medications: None       No Known Allergies    PHYSICAL EXAM    Objective   Vitals:   First set: Temperature: 98 1 °F (36 7 °C) (05/20/22 0750)  Pulse: 94 (05/20/22 0750)  Respirations: 16 (05/20/22 0750)  Blood Pressure: 124/81 (05/20/22 0750)  SpO2: 94 % (05/20/22 0750)    Primary Survey:   (A) Airway: Intact with normal phonation  (B) Breathing:  Equal bilaterally  Rhonchi are present diffusely  (C) Circulation: Pulses:   pedal  2/4 and radial  2/4 bilaterally; no external hemorrhage  (D) Disabliity:  GCS Total:  15  Intact sensation in UE/LE bilaterally  Strength 5/5 in UE/LE bilaterally  (E) Expose:  Completed    Secondary Survey:   Physical Exam  Vitals and nursing note reviewed  Constitutional:       General: He is awake  He is in acute distress (mild painful distress)  Appearance: Normal appearance  He is well-developed  HENT:      Head: Normocephalic and atraumatic  Right Ear: Hearing, tympanic membrane, ear canal and external ear normal       Left Ear: Hearing, tympanic membrane, ear canal and external ear normal    Eyes:      General: Lids are normal       Extraocular Movements: Extraocular movements intact  Conjunctiva/sclera: Conjunctivae normal       Pupils: Pupils are equal, round, and reactive to light  Comments: Visual acuity as per RN notes   Neck:      Trachea: Trachea and phonation normal       Comments:  There is no posterior midline ttp or stepoff  Cardiovascular:      Rate and Rhythm: Normal rate and regular rhythm  Pulses:           Radial pulses are 2+ on the right side and 2+ on the left side  Dorsalis pedis pulses are 2+ on the right side and 2+ on the left side  Posterior tibial pulses are 2+ on the right side and 2+ on the left side  Heart sounds: Normal heart sounds, S1 normal and S2 normal  No murmur heard  No friction rub  No gallop  Pulmonary:      Effort: Pulmonary effort is normal  No respiratory distress  Breath sounds: No stridor  Rhonchi (Present throughout all lung fields bilaterally) present  No decreased breath sounds, wheezing or rales  Abdominal:      General: There is no distension  Palpations: There is no mass  Tenderness: There is no abdominal tenderness  There is no guarding or rebound  Musculoskeletal:      Cervical back: No spinous process tenderness or muscular tenderness  Comments: There is significantly increased thoracic kyphosis  There is midthoracic back tenderness at approximately the T9 level without overlying skin change or step-off  There is also diffuse tenderness over the sacrum bilaterally, left greater than right without overlying skin change  Skin:     General: Skin is warm and dry  Neurological:      Mental Status: He is alert and oriented to person, place, and time  GCS: GCS eye subscore is 4  GCS verbal subscore is 5  GCS motor subscore is 6  Cranial Nerves: No cranial nerve deficit  Sensory: No sensory deficit  Motor: No abnormal muscle tone  Comments: PERRLA; EOMI  Sensation intact to light touch over face in V1-V3 distribution bilaterally  Facial expressions symmetric  Tongue/uvula midline  Shoulder shrug equal bilaterally  Strength 5/5 in UE/LE bilaterally  Sensation intact to light touch in UE/LE bilaterally  Cervical spine cleared by clinical criteria?  No (imaging required)      Invasive Devices  Report    Peripheral Intravenous Line  Duration           Peripheral IV 05/20/22 Right Antecubital <1 day                Lab Results:   Results Reviewed     Procedure Component Value Units Date/Time    COVID19, Influenza A/B, RSV PCR, [facility] [[id number]]  (Abnormal) Collected: 05/20/22 1050    Lab Status: Final result Specimen: Nasopharyngeal Swab Updated: 05/20/22 1151     SARS-CoV-2 Positive     INFLUENZA A PCR Negative     INFLUENZA B PCR Negative     RSV PCR Negative    Narrative:      FOR PEDIATRIC PATIENTS - copy/paste COVID Guidelines URL to browser: [email or web address]  [employer]    SARS-CoV-2 assay is a Nucleic Acid Amplification assay intended for the  qualitative detection of nucleic acid from SARS-CoV-2 in nasopharyngeal  swabs  Results are for the presumptive identification of SARS-CoV-2 RNA  Positive results are indicative of infection with SARS-CoV-2, the virus  causing COVID-19, but do not rule out bacterial infection or co-infection  with other viruses  Laboratories within the [place] and its  territories are required to report all positive results to the appropriate  public health authorities  Negative results do not preclude SARS-CoV-2  infection and should not be used as the sole basis for treatment or other  patient management decisions  Negative results must be combined with  clinical observations, patient history, and epidemiological information  This test has not been FDA cleared or approved  This test has been authorized by FDA under an Emergency Use Authorization  (EUA)  This test is only authorized for the duration of time the  declaration that circumstances exist justifying the authorization of the  emergency use of an in vitro diagnostic tests for detection of SARS-CoV-2  virus and/or diagnosis of COVID-19 infection under section 564(b)(1) of  the Act, 21 U  S C  030HCV-2(U)(0), unless the authorization is terminated  or revoked sooner  The test has been validated but independent review by FDA  and CLIA is pending  Test performed using [employer] Gene[employer]pert: This RT-PCR assay targets N2,  a region unique to SARS-CoV-2  A conserved region in the E-gene was chosen  for pan-Sarbecovirus detection which includes SARS-CoV-2  HS Troponin I 2hr [[id number]]  (Normal) Collected: 05/20/22 1050    Lab Status: Final result Specimen: Blood from Arm, Right Updated: 05/20/22 1134     hs TnI 2hr 11 ng/L      Delta 2hr hsTnI 0 ng/L     Occult blood x 3, stool [[id number]]     Lab Status: No result Specimen: Stool     HS Troponin I 4hr [[id number]]     Lab Status: No result Specimen: Blood     Urinalysis with microscopic [[id number]]     Lab Status: No result Specimen: Urine     Urine culture [[id number]]     Lab Status: No result Specimen: Urine, Clean Catch     TSH [[id number]]  (Normal) Collected: 05/20/22 0824    Lab Status: Final result Specimen: Blood from Arm, Right Updated: 05/20/22 1004     TSH 3RD GENERATON 2 436 uIU/mL     Narrative:      Patients undergoing fluorescein dye angiography may retain small amounts of fluorescein in the body for 48-72 hours post procedure  Samples containing fluorescein can produce falsely depressed TSH values  If the patient had this procedure,a specimen should be resubmitted post fluorescein clearance        Hepatic function panel [[id number]]  (Abnormal) Collected: 05/20/22 0824    Lab Status: Final result Specimen: Blood from Arm, Right Updated: 05/20/22 0942     Total Bilirubin 0 89 mg/dL      Bilirubin, Direct 0 49 mg/dL      Alkaline Phosphatase 120 U/L       U/L      ALT 43 U/L      Total Protein 6 9 g/dL      Albumin 2 9 g/dL     Osmolality-"If this is regarding a toxic alcohol, please STOP and consult medical  for further guidance " [[id number]]     Lab Status: No result Specimen: Blood     Sodium, urine, random [[id number]]     Lab Status: No result Specimen: Urine     Osmolality, urine [[id number]]     Lab Status: No result Specimen: Urine     Basic metabolic panel [[id number]]  (Abnormal) Collected: 05/20/22 0824    Lab Status: Final result Specimen: Blood from Arm, Right Updated: 05/20/22 0859     Sodium 120 mmol/L      Potassium 2 3 mmol/L      Chloride 81 mmol/L      CO2 29 mmol/L      ANION GAP 10 mmol/L      BUN 10 mg/dL      Creatinine 1 07 mg/dL      Glucose 91 mg/dL      Calcium 7 5 mg/dL      eGFR 77 ml/min/1 73sq m     Narrative:      [place] guidelines for Chronic Kidney Disease (CKD):     Stage 1 with normal or high GFR (GFR > 90 mL/min/1 73 square meters)    Stage 2 Mild CKD (GFR = 60-89 mL/min/1 73 square meters)    Stage 3A Moderate CKD (GFR = 45-59 mL/min/1 73 square meters)    Stage 3B Moderate CKD (GFR = 30-44 mL/min/1 73 square meters)    Stage 4 Severe CKD (GFR = 15-29 mL/min/1 73 square meters)    Stage 5 End Stage CKD (GFR <15 mL/min/1 73 square meters)  Note: GFR calculation is accurate only with a steady state creatinine    NT-BNP PRO [[id number]]  (Abnormal) Collected: 05/20/22 0824    Lab Status: Final result Specimen: Blood from Arm, Right Updated: 05/20/22 0857     NT-proBNP 253 pg/mL     HS Troponin 0hr (reflex protocol) [[id number]]  (Normal) Collected: 05/20/22 0824    Lab Status: Final result Specimen: Blood from Arm, Right Updated: 05/20/22 0856     hs TnI 0hr 11 ng/L     UA w Reflex to Microscopic w Reflex to Culture [[id number]]     Lab Status: No result Specimen: Urine     CBC and differential [[id number]]  (Abnormal) Collected: 05/20/22 0824    Lab Status: Final result Specimen: Blood from Arm, Right Updated: 05/20/22 0845     WBC 5 22 Thousand/uL      RBC 3 76 Million/uL      Hemoglobin 8 5 g/dL      Hematocrit 26 3 %      MCV 70 fL      MCH 22 6 pg      MCHC 32 3 g/dL      RDW 23 7 %      Platelets 86 Thousands/uL      nRBC 0 /100 WBCs      Neutrophils Relative 76 %      Immat GRANS % 1 %      Lymphocytes Relative 16 % Monocytes Relative 7 %      Eosinophils Relative 0 %      Basophils Relative 0 %      Neutrophils Absolute 3 97 Thousands/µL      Immature Grans Absolute 0 05 Thousand/uL      Lymphocytes Absolute 0 81 Thousands/µL      Monocytes Absolute 0 38 Thousand/µL      Eosinophils Absolute 0 00 Thousand/µL      Basophils Absolute 0 01 Thousands/µL     Magnesium [[id number]]  (Abnormal) Collected: 05/20/22 0824    Lab Status: Final result Specimen: Blood from Arm, Right Updated: 05/20/22 0845     Magnesium 1 1 mg/dL     Protime-INR [[id number]]  (Normal) Collected: 05/20/22 0824    Lab Status: Final result Specimen: Blood from Arm, Right Updated: 05/20/22 0845     Protime 12 5 seconds      INR 0 98    Blood gas, venous [[id number]]  (Abnormal) Collected: 05/20/22 0824    Lab Status: Final result Specimen: Blood from Arm, Right Updated: 05/20/22 0835     pH, Shalom 7 445     pCO2, Shalom 42 2 mm Hg      pO2, Shalom 32 0 mm Hg      HCO3, Shalom 28 3 mmol/L      Base Excess, Shalom 3 9 mmol/L      O2 Content, Shalom 7 3 ml/dL      O2 HGB, VENOUS 53 7 %                  Imaging Studies:   Direct to CT: No  TRAUMA - CT head wo contrast   Final Result by Tom Edward MD (05/20 5512)      No acute intracranial abnormality  Microangiopathic changes  Moderate diffuse parenchymal volume loss  Pansinus mucosal thickening  The study was marked in [facility] for immediate notification  Workstation performed: [id number]         TRAUMA - CT spine cervical wo contrast   Final Result by Tom Edward MD (05/20 0845)      No cervical spine fracture or traumatic malalignment  Unchanged fracture of the right-sided screw at T1  Hardware is unchanged in appearance  Multilevel degenerative change of the spine  Workstation performed: [id number]         CT chest abdomen pelvis wo contrast   Final Result by Za Mccann MD (28/83 4687)      1    Limited examination due to absence of IV contrast    2      3   Mild ectasia of the ascending aorta, measuring up to 4 0 cm       4   Lingular subsegmental atelectasis  5   Hepatic steatosis  6   Persistent pseudocyst in the pancreatic head, decreased in size since a CT from 10/30/2021       7   Nonspecific mild bladder wall thickening  8   No evidence of acute injury in the chest, abdomen or pelvis  9   Chronic compression fractures of T6 and T7  Healed fracture of the left inferior pubic ramus and several healed right-sided rib fractures  10   Mild compression fracture of T8, involving the inferior endplate, age indeterminate although developing since 10/30/2021  11   Recent appearing fractures of the right 9th and 10th ribs at their costovertebral junctions  The study was marked in [facility] for immediate notification  Workstation performed: [id number]         CT recon only thoracolumbar (no charge)   Final Result by Frantz Saenz MD (05/20 4956)      1  Chronic compression fractures of T2, T3, T6 and T7       2   Mild compression fractures of T8 4 and T8, age indeterminant, although developing since 10/30/2021       3   Chronic, healed fractures of the posterior portions of several ribs, bilaterally  4   Recent appearing fracture of the left sacral ala  5   Degenerative changes at several thoracic and lumbar levels  The study was marked in [facility] for immediate notification  Workstation performed: [id number]         XR Trauma chest portable   ED Interpretation by Kristy Barreto DO (05/20 8432)   Airway is midline  Lungs are clear bilaterally with no evidence of pulmonary vascular congestion/focal infiltrate/pleural effusion//pneumothorax  Cardiac and mediastinal silhouettes are within normal limits   Osseous structures appear normal               Procedures  Procedures         ED Course  ED Course as of 05/20/22 1202   Fri May 20, 2022   0815 ECG NSR 90 bpm +LAFB   QRS 94 Qtc 499  No acute st/t changes  Q wave V2-V3  No significant changes from 22 Nov 2020  Interpreted by me   1282 CTs completed and awaiting interpretation   0835 Blood gas, venous(!)  There is alkalemia with normal pCO2 and normal HC03  There is hypoxemia   0845 Protime-INR  Normal   0847 CBC and differential(!)  WBCs are normal  He is anemic  although this is comparable and slightly improved from prior  He is thrombocytopenic but again this is comparable to prior   0847 Magnesium(!)  There is hypomagnesemia for which IV repletion will be started   0849 TRAUMA - CT head wo contrast   0849 TRAUMA - CT spine cervical wo contrast  FINDINGS:     ALIGNMENT:  Straightening of the mid and lower cervical spine related to surgical fixation      VERTEBRAL BODIES:  No fracture      DEGENERATIVE CHANGES:  Posterior decompression from C4 through C7, with paired vertical rods and screws from C4 through T1  Fracture of the right T1 screw is unchanged (series 601, image 112)  Post anterior cervical discectomy and fusion at C5 through   the C7      Pannus formation is noted adjacent to the dens  Moderate degree of degenerative change  Significant left-sided facet arthrosis at C2-C3 has slightly increased since prior study  No spinal canal stenosis      PREVERTEBRAL AND PARASPINAL SOFT TISSUES:  Unremarkable      THORACIC INLET:  Please refer to the concurrent chest, abdomen, and pelvic CT report for description of the thoracic inlet findings      IMPRESSION:     No cervical spine fracture or traumatic malalignment  Unchanged fracture of the right-sided screw at T1  Hardware is unchanged in appearance  Multilevel degenerative change of the spine       0849 TRAUMA - CT head wo contrast    FINDINGS:     PARENCHYMA: Decreased attenuation is noted in periventricular and subcortical white matter demonstrating an appearance that is statistically most likely to represent mild microangiopathic change      No CT signs of acute infarction    No intracranial mass, mass effect or midline shift  No acute parenchymal hemorrhage      VENTRICLES AND EXTRA-AXIAL SPACES:  Ventricles and extra-axial CSF spaces are prominent commensurate with the degree of moderate volume loss  No hydrocephalus  No acute extra-axial hemorrhage      VISUALIZED ORBITS AND PARANASAL SINUSES:  Pansinus mucosal thickening  No acute orbital findings      CALVARIUM AND EXTRACRANIAL SOFT TISSUES:  Normal      IMPRESSION:     No acute intracranial abnormality  Microangiopathic changes  Moderate diffuse parenchymal volume loss  Pansinus mucosal thickening  0857 HS Troponin 0hr (reflex protocol)  Not diagnostic of ACS but not sufficiently low so as to exclude  Will require two hour repeat value   0858 NT-BNP PRO(!)  Elevated and increased from prior normal value   0859 Potassium critically low at 2 3 mmol/L; will require prolonged IV repletion   7879 Basic metabolic panel(!!)  He is hyponatremic; he has been chronically hyponatremic but this is recently worsened  This may be contributing to his recent falls  As noted, he is critically hypokalemic as well   0923 CT chest abdomen pelvis wo contrast  IMPRESSION:     1   Limited examination due to absence of IV contrast   2     3   Mild ectasia of the ascending aorta, measuring up to 4 0 cm      4   Lingular subsegmental atelectasis      5  Hepatic steatosis      6   Persistent pseudocyst in the pancreatic head, decreased in size since a CT from 10/30/2021      7   Nonspecific mild bladder wall thickening      8  No evidence of acute injury in the chest, abdomen or pelvis      9  Chronic compression fractures of T6 and T7  Healed fracture of the left inferior pubic ramus and several healed right-sided rib fractures      10  Mild compression fracture of T8, involving the inferior endplate, age indeterminate although developing since 10/30/2021      11    Recent appearing fractures of the right 9th and 10th ribs at their costovertebral junctions      The study was marked in [facility] for immediate notification  9823 CT chest/abdomen/pelvis demonstrates T-spine and rib findings consistent with area of patient's pain  No other traumatic findings identified  Given constellation of new traumatic findings, multiple falls in setting of hyponatremia, and critical hypokalemia/hypomagnesemia, patient will require admission  These results were discussed with the patient who is in agreement with this   9939 Hepatic function panel(!)  The transaminases are generally similar to prior values apart from the worsening hypoalbuminemia   0956 Tiger Text to Dr Carlos Ly of [facility]   1001 Dr Carlos Ly accepts in med surg admission   1022 CT recon only thoracolumbar (no charge)  IMPRESSION:     1  Chronic compression fractures of T2, T3, T6 and T7      2   Mild compression fractures of T8 4 and T8, age indeterminant, although developing since 10/30/2021      3  Chronic, healed fractures of the posterior portions of several ribs, bilaterally      4   Recent appearing fracture of the left sacral ala      5  Degenerative changes at several thoracic and lumbar levels         The study was marked in [facility] for immediate notification  MDM        Disposition  [facility] Transfer: No  Final diagnoses:   Fall in home, initial encounter   Closed head injury, initial encounter   Closed fracture of multiple ribs of right side, initial encounter   Closed wedge compression fracture of T8 vertebra, initial encounter ([facility] [address] )   Hyponatremia   Hypokalemia   Hypomagnesemia     Time reflects when diagnosis was documented in both MDM as applicable and the Disposition within this note     Time User Action Codes Description Comment    5/20/2022 10:04 AM Benny Gore Add [O15  Prabhakar Hoskins,  G71 519] Fall in home, initial encounter     5/20/2022 10:04 AM Benny Gore Add [S09 90XA] Closed head injury, initial encounter     5/20/2022 10:05 AM Benny Tancred Add [S22 41XA] Closed fracture of multiple ribs of right side, initial encounter     5/20/2022 10:05 AM Benny Gore Add [M44 509S] Closed wedge compression fracture of T8 vertebra, initial encounter ([facility] [address] )     5/20/2022 10:05 AM Benny Gore Add [E87 1] Hyponatremia     5/20/2022 10:05 AM Benny Gore Add [E87 6] Hypokalemia     5/20/2022 10:05 AM Benny Gore Add [E83 42] Hypomagnesemia     5/20/2022 11:56 AM Emma Khan Add [Z72 89] Alcohol use       ED Disposition     ED Disposition   Admit    Condition   Stable    Date/Time   Fri May 20, 2022 10:04 AM    Comment   Case was discussed with Dr Carlos Ly and the patient's admission status was agreed to be Admission Status: inpatient status to the service of Dr Carlos Ly   Follow-up Information    None       Current Discharge Medication List      CONTINUE these medications which have NOT CHANGED    Details   albuterol (PROVENTIL HFA,VENTOLIN HFA) 90 mcg/act inhaler Inhale 2 puffs every 4 (four) hours as needed for wheezing or shortness of breath  Qty: 18 g, Refills: 2    Comments: Please consider 90 day supplies to promote better adherence  Associated Diagnoses: Chronic obstructive pulmonary disease with acute exacerbation (HCC)      Cholecalciferol 25 MCG (1000 UT) tablet Take 1 tablet (1,000 Units total) by mouth daily  Qty: 30 tablet, Refills: 0    Associated Diagnoses: Alcohol abuse      dextromethorphan-guaifenesin (MUCINEX DM)  MG per 12 hr tablet Take 1 tablet by mouth every 12 (twelve) hours  Qty: 14 tablet, Refills: 0    Associated Diagnoses: Viral URI with cough;  Chronic obstructive pulmonary disease with acute exacerbation (HCC)      ergocalciferol (VITAMIN D2) 50,000 units Take 1 capsule (50,000 Units total) by mouth once a week  Qty: 13 capsule, Refills: 1    Associated Diagnoses: Vitamin D deficiency      esomeprazole (NexIUM) 40 MG capsule Take 1 capsule (40 mg total) by mouth 2 (two) times a day before meals  Qty: 60 capsule, Refills: 3 Associated Diagnoses: Gastroesophageal reflux disease, unspecified whether esophagitis present      folic acid (FOLVITE) 1 mg tablet Take 1 tablet (1 mg total) by mouth daily  Qty: 30 tablet, Refills: 0    Associated Diagnoses: Folic acid deficiency      levETIRAcetam (KEPPRA) 1000 MG tablet Take 1 tablet (1,000 mg total) by mouth every 12 (twelve) hours  Qty: 60 tablet, Refills: 2    Associated Diagnoses: Cervical disc syndrome; Seizure disorder (HCC)      lisinopril (ZESTRIL) 10 mg tablet Take 1 tablet (10 mg total) by mouth daily  Qty: 30 tablet, Refills: 5    Associated Diagnoses: Essential hypertension, benign      Multiple Vitamin (TAB-A-BONI PO) Take 1 tablet by mouth daily      pancrelipase, Lip-Prot-Amyl, (CREON) 6,000 units delayed release capsule Take 6,000 units of lipase by mouth 3 (three) times a day with meals  Qty: 90 capsule, Refills: 2    Associated Diagnoses: Alcohol-induced acute pancreatitis, unspecified complication status      thiamine 100 MG tablet Take 1 tablet (100 mg total) by mouth daily  Qty: 30 tablet, Refills: 0    Associated Diagnoses: Alcohol abuse      albuterol (2 5 mg/3 mL) 0 083 % nebulizer solution Take 2 5 mg by nebulization every 6 (six) hours as needed for wheezing or shortness of breath      amitriptyline (ELAVIL) 25 mg tablet Take 1 tablet (25 mg total) by mouth daily at bedtime as needed for sleep  Qty: 30 tablet, Refills: 3    Associated Diagnoses: Insomnia, unspecified type      cyanocobalamin (VITAMIN B-12) 100 mcg tablet Take 1 tablet (100 mcg total) by mouth daily  Qty: 30 tablet, Refills: 5    Associated Diagnoses: Alcohol abuse      docusate sodium (COLACE) 100 mg capsule Take 1 capsule (100 mg total) by mouth 2 (two) times a day as needed for constipation  Qty: 30 capsule, Refills: 0    Associated Diagnoses: Constipation, unspecified constipation type      gabapentin (NEURONTIN) 100 mg capsule Take 1 capsule (100 mg total) by mouth 3 (three) times a day  Qty: 90 capsule, Refills: 3    Comments: Please consider 90 day supplies to promote better adherence  Associated Diagnoses: Dorsalgia      polyethylene glycol (GOLYTELY) 4000 mL solution Take 4,000 mL by mouth once for 1 dose  Qty: 4000 mL, Refills: 0    Associated Diagnoses: Microcytic anemia           No discharge procedures on file      PDMP Review       Value Time User    PDMP Reviewed  Yes 11/22/2020  8:17 PM Serjio Sam PA-C          ED Provider  Electronically Signed by         Kristy Barreto DO  05/20/22 8625

## 2022-05-20 NOTE — PLAN OF CARE
Problem: Nutrition/Hydration-ADULT  Goal: Nutrient/Hydration intake appropriate for improving, restoring or maintaining nutritional needs  Description: Monitor and assess patient's nutrition/hydration status for malnutrition  Collaborate with interdisciplinary team and initiate plan and interventions as ordered  Monitor patient's weight and dietary intake as ordered or per policy  Utilize nutrition screening tool and intervene as necessary  Determine patient's food preferences and provide high-protein, high-caloric foods as appropriate       INTERVENTIONS:  - Monitor oral intake, urinary output, labs, and treatment plans  - Assess nutrition and hydration status and recommend course of action  - Evaluate amount of meals eaten  - Assist patient with eating if necessary   - Allow adequate time for meals  - Recommend/ encourage appropriate diets, oral nutritional supplements, and vitamin/mineral supplements  - Order, calculate, and assess calorie counts as needed  - Recommend, monitor, and adjust tube feedings and TPN/PPN based on assessed needs  - Assess need for intravenous fluids  - Provide specific nutrition/hydration education as appropriate  - Include patient/family/caregiver in decisions related to nutrition  Outcome: Progressing     Problem: MOBILITY - ADULT  Goal: Maintain or return to baseline ADL function  Description: INTERVENTIONS:  -  Assess patient's ability to carry out ADLs; assess patient's baseline for ADL function and identify physical deficits which impact ability to perform ADLs (bathing, care of mouth/teeth, toileting, grooming, dressing, etc )  - Assess/evaluate cause of self-care deficits   - Assess range of motion  - Assess patient's mobility; develop plan if impaired  - Assess patient's need for assistive devices and provide as appropriate  - Encourage maximum independence but intervene and supervise when necessary  - Involve family in performance of ADLs  - Assess for home care needs following discharge   - Consider OT consult to assist with ADL evaluation and planning for discharge  - Provide patient education as appropriate  Outcome: Progressing  Goal: Maintains/Returns to pre admission functional level  Description: INTERVENTIONS:  - Perform BMAT or MOVE assessment daily    - Set and communicate daily mobility goal to care team and patient/family/caregiver  - Collaborate with rehabilitation services on mobility goals if consulted  - Perform Range of Motion 4 times a day  - Reposition patient every 2 hours    - Dangle patient 3 times a day  - Stand patient 3 times a day  - Ambulate patient 3 times a day  - Out of bed to chair 3 times a day   - Out of bed for meals 3 times a day  - Out of bed for toileting  - Record patient progress and toleration of activity level   Outcome: Progressing     Problem: Potential for Falls  Goal: Patient will remain free of falls  Description: INTERVENTIONS:  - Educate patient/family on patient safety including physical limitations  - Instruct patient to call for assistance with activity   - Consult OT/PT to assist with strengthening/mobility   - Keep Call bell within reach  - Keep bed low and locked with side rails adjusted as appropriate  - Keep care items and personal belongings within reach  - Initiate and maintain comfort rounds  - Make Fall Risk Sign visible to staff  - Offer Toileting every 2 Hours, in advance of need  - Initiate/Maintain fall alarm  - Obtain necessary fall risk management equipment: alarm, nonskid socks, yellow bracelet  - Apply yellow socks and bracelet for high fall risk patients  - Consider moving patient to room near nurses station  Outcome: Progressing     Problem: PAIN - ADULT  Goal: Verbalizes/displays adequate comfort level or baseline comfort level  Description: Interventions:  - Encourage patient to monitor pain and request assistance  - Assess pain using appropriate pain scale  - Administer analgesics based on type and severity of pain and evaluate response  - Implement non-pharmacological measures as appropriate and evaluate response  - Consider cultural and social influences on pain and pain management  - Notify physician/advanced practitioner if interventions unsuccessful or patient reports new pain  Outcome: Progressing     Problem: INFECTION - ADULT  Goal: Absence or prevention of progression during hospitalization  Description: INTERVENTIONS:  - Assess and monitor for signs and symptoms of infection  - Monitor lab/diagnostic results  - Monitor all insertion sites, i e  indwelling lines, tubes, and drains  - Monitor endotracheal if appropriate and nasal secretions for changes in amount and color  - [place] appropriate cooling/warming therapies per order  - Administer medications as ordered  - Instruct and encourage patient and family to use good hand hygiene technique  - Identify and instruct in appropriate isolation precautions for identified infection/condition  Outcome: Progressing     Problem: SAFETY ADULT  Goal: Maintain or return to baseline ADL function  Description: INTERVENTIONS:  -  Assess patient's ability to carry out ADLs; assess patient's baseline for ADL function and identify physical deficits which impact ability to perform ADLs (bathing, care of mouth/teeth, toileting, grooming, dressing, etc )  - Assess/evaluate cause of self-care deficits   - Assess range of motion  - Assess patient's mobility; develop plan if impaired  - Assess patient's need for assistive devices and provide as appropriate  - Encourage maximum independence but intervene and supervise when necessary  - Involve family in performance of ADLs  - Assess for home care needs following discharge   - Consider OT consult to assist with ADL evaluation and planning for discharge  - Provide patient education as appropriate  Outcome: Progressing  Goal: Maintains/Returns to pre admission functional level  Description: INTERVENTIONS:  - Perform BMAT or MOVE assessment daily    - Set and communicate daily mobility goal to care team and patient/family/caregiver  - Collaborate with rehabilitation services on mobility goals if consulted  - Perform Range of Motion 4 times a day  - Reposition patient every 2 hours    - Dangle patient 3 times a day  - Stand patient 3 times a day  - Ambulate patient 3 times a day  - Out of bed to chair 3 times a day   - Out of bed for meals 3 times a day  - Out of bed for toileting  - Record patient progress and toleration of activity level   Outcome: Progressing  Goal: Patient will remain free of falls  Description: INTERVENTIONS:  - Educate patient/family on patient safety including physical limitations  - Instruct patient to call for assistance with activity   - Consult OT/PT to assist with strengthening/mobility   - Keep Call bell within reach  - Keep bed low and locked with side rails adjusted as appropriate  - Keep care items and personal belongings within reach  - Initiate and maintain comfort rounds  - Make Fall Risk Sign visible to staff  - Offer Toileting every 2 Hours, in advance of need  - Initiate/Maintain fall alarm  - Obtain necessary fall risk management equipment: alarm, nonskid socks, yellow bracelet  - Apply yellow socks and bracelet for high fall risk patients  - Consider moving patient to room near nurses station  Outcome: Progressing     Problem: DISCHARGE PLANNING  Goal: Discharge to home or other facility with appropriate resources  Description: INTERVENTIONS:  - Identify barriers to discharge w/patient and caregiver  - Arrange for needed discharge resources and transportation as appropriate  - Identify discharge learning needs (meds, wound care, etc )  - Arrange for interpretive services to assist at discharge as needed  - Refer to Case Management Department for coordinating discharge planning if the patient needs post-hospital services based on physician/advanced practitioner order or complex needs related to functional status, cognitive ability, or social support system  Outcome: Progressing     Problem: Knowledge Deficit  Goal: Patient/family/caregiver demonstrates understanding of disease process, treatment plan, medications, and discharge instructions  Description: Complete learning assessment and assess knowledge base    Interventions:  - Provide teaching at level of understanding  - Provide teaching via preferred learning methods  Outcome: Progressing     Problem: RESPIRATORY - ADULT  Goal: Achieves optimal ventilation and oxygenation  Description: INTERVENTIONS:  - Assess for changes in respiratory status  - Assess for changes in mentation and behavior  - Position to facilitate oxygenation and minimize respiratory effort  - Oxygen administered by appropriate delivery if ordered  - Initiate smoking cessation education as indicated  - Encourage broncho-pulmonary hygiene including cough, deep breathe, Incentive Spirometry  - Assess the need for suctioning and aspirate as needed  - Assess and instruct to report SOB or any respiratory difficulty  - Respiratory Therapy support as indicated  Outcome: Progressing     Problem: GENITOURINARY - ADULT  Goal: Maintains or returns to baseline urinary function  Description: INTERVENTIONS:  - Assess urinary function  - Encourage oral fluids to ensure adequate hydration if ordered  - Administer IV fluids as ordered to ensure adequate hydration  - Administer ordered medications as needed  - Offer frequent toileting  - Follow urinary retention protocol if ordered  Outcome: Progressing  Goal: Absence of urinary retention  Description: INTERVENTIONS:  - Assess patients ability to void and empty bladder  - Monitor I/O  - Bladder scan as needed  - Discuss with physician/AP medications to alleviate retention as needed  - Discuss catheterization for long term situations as appropriate  Outcome: Progressing     Problem: METABOLIC, FLUID AND ELECTROLYTES - ADULT  Goal: Electrolytes maintained within normal limits  Description: INTERVENTIONS:  - Monitor labs and assess patient for signs and symptoms of electrolyte imbalances  - Administer electrolyte replacement as ordered  - Monitor response to electrolyte replacements, including repeat lab results as appropriate  - Instruct patient on fluid and nutrition as appropriate  Outcome: Progressing  Goal: Fluid balance maintained  Description: INTERVENTIONS:  - Monitor labs   - Monitor I/O and WT  - Instruct patient on fluid and nutrition as appropriate  - Assess for signs & symptoms of volume excess or deficit  Outcome: Progressing     Problem: MUSCULOSKELETAL - ADULT  Goal: Maintain or return mobility to safest level of function  Description: INTERVENTIONS:  - Assess patient's ability to carry out ADLs; assess patient's baseline for ADL function and identify physical deficits which impact ability to perform ADLs (bathing, care of mouth/teeth, toileting, grooming, dressing, etc )  - Assess/evaluate cause of self-care deficits   - Assess range of motion  - Assess patient's mobility  - Assess patient's need for assistive devices and provide as appropriate  - Encourage maximum independence but intervene and supervise when necessary  - Involve family in performance of ADLs  - Assess for home care needs following discharge   - Consider OT consult to assist with ADL evaluation and planning for discharge  - Provide patient education as appropriate  Outcome: Progressing  Goal: Maintain proper alignment of affected body part  Description: INTERVENTIONS:  - Support, maintain and protect limb and body alignment  - Provide patient/ family with appropriate education  Outcome: Progressing

## 2022-05-20 NOTE — ASSESSMENT & PLAN NOTE
· Likely due to rib fractures  · Currently requiring 3 lpm of continuous supplemental oxygen to maintain oxygen saturation levels at 92% and above  · Rib fracture protocol  · Incentive spirometry  · Rib fracture protocol

## 2022-05-20 NOTE — ASSESSMENT & PLAN NOTE
· Check an iron panel, vitamin B12 level, and folate level  · Check the patient's stool for occult blood x 3 specimens  · Follow the CBC  · Transfuse for a hemoglobin less than 7 g/dl    Results from last 7 days   Lab Units 05/20/22  0824   WBC Thousand/uL 5 22   HEMOGLOBIN g/dL 8 5*   HEMATOCRIT % 26 3*   PLATELETS Thousands/uL 86*

## 2022-05-20 NOTE — ASSESSMENT & PLAN NOTE
· Due to alcohol abuse  · Avoid all hepatotoxic agents  · Outpatient surveillance imaging with Gastroenterology

## 2022-05-20 NOTE — ASSESSMENT & PLAN NOTE
· Utilize the CIWA protocol  · Utilize folic acid 1 mg IV Qdaily and thiamine 100 mg IV Qdaily  · Daily PO multivitamin

## 2022-05-21 ENCOUNTER — APPOINTMENT (INPATIENT)
Dept: CT IMAGING | Facility: HOSPITAL | Age: 57
DRG: 137 | End: 2022-05-21
Payer: COMMERCIAL

## 2022-05-21 PROBLEM — U07.1 COVID-19 VIRUS INFECTION: Status: ACTIVE | Noted: 2022-05-21

## 2022-05-21 PROBLEM — R79.89 ELEVATED D-DIMER: Status: ACTIVE | Noted: 2022-05-21

## 2022-05-21 LAB
25(OH)D3 SERPL-MCNC: 23.9 NG/ML (ref 30–100)
ALBUMIN SERPL BCP-MCNC: 2.7 G/DL (ref 3.5–5)
ALP SERPL-CCNC: 111 U/L (ref 46–116)
ALT SERPL W P-5'-P-CCNC: 28 U/L (ref 12–78)
ANION GAP SERPL CALCULATED.3IONS-SCNC: 10 MMOL/L (ref 4–13)
ANION GAP SERPL CALCULATED.3IONS-SCNC: 9 MMOL/L (ref 4–13)
APTT PPP: 34 SECONDS (ref 23–37)
AST SERPL W P-5'-P-CCNC: 73 U/L (ref 5–45)
BACTERIA UR CULT: NORMAL
BASOPHILS # BLD AUTO: 0 THOUSANDS/ΜL (ref 0–0.1)
BASOPHILS NFR BLD AUTO: 0 % (ref 0–1)
BILIRUB SERPL-MCNC: 0.87 MG/DL (ref 0.2–1)
BUN SERPL-MCNC: 8 MG/DL (ref 5–25)
BUN SERPL-MCNC: 8 MG/DL (ref 5–25)
CALCIUM ALBUM COR SERPL-MCNC: 8.4 MG/DL (ref 8.3–10.1)
CALCIUM SERPL-MCNC: 7.4 MG/DL (ref 8.3–10.1)
CALCIUM SERPL-MCNC: 7.6 MG/DL (ref 8.3–10.1)
CARDIAC TROPONIN I PNL SERPL HS: 14 NG/L (ref 8–18)
CHLORIDE SERPL-SCNC: 84 MMOL/L (ref 100–108)
CHLORIDE SERPL-SCNC: 87 MMOL/L (ref 100–108)
CK SERPL-CCNC: 104 U/L (ref 39–308)
CO2 SERPL-SCNC: 25 MMOL/L (ref 21–32)
CO2 SERPL-SCNC: 28 MMOL/L (ref 21–32)
CREAT SERPL-MCNC: 0.77 MG/DL (ref 0.6–1.3)
CREAT SERPL-MCNC: 0.85 MG/DL (ref 0.6–1.3)
CRP SERPL QL: 9.8 MG/L
D DIMER PPP FEU-MCNC: 1.37 UG/ML FEU
EOSINOPHIL # BLD AUTO: 0 THOUSAND/ΜL (ref 0–0.61)
EOSINOPHIL NFR BLD AUTO: 0 % (ref 0–6)
ERYTHROCYTE [DISTWIDTH] IN BLOOD BY AUTOMATED COUNT: 23.6 % (ref 11.6–15.1)
FERRITIN SERPL-MCNC: 301 NG/ML (ref 8–388)
FIBRINOGEN PPP-MCNC: 172 MG/DL (ref 227–495)
FOLATE SERPL-MCNC: 10.8 NG/ML (ref 3.1–17.5)
GFR SERPL CREATININE-BSD FRML MDRD: 102 ML/MIN/1.73SQ M
GFR SERPL CREATININE-BSD FRML MDRD: 98 ML/MIN/1.73SQ M
GLUCOSE SERPL-MCNC: 120 MG/DL (ref 65–140)
GLUCOSE SERPL-MCNC: 190 MG/DL (ref 65–140)
HAV IGM SER QL: NORMAL
HBV CORE IGM SER QL: NORMAL
HBV SURFACE AG SER QL: NORMAL
HCT VFR BLD AUTO: 22.8 % (ref 36.5–49.3)
HCT VFR BLD AUTO: 25 % (ref 36.5–49.3)
HCV AB SER QL: NORMAL
HGB BLD-MCNC: 7.1 G/DL (ref 12–17)
HGB BLD-MCNC: 7.7 G/DL (ref 12–17)
IMM GRANULOCYTES # BLD AUTO: 0.05 THOUSAND/UL (ref 0–0.2)
IMM GRANULOCYTES NFR BLD AUTO: 1 % (ref 0–2)
INR PPP: 1.14 (ref 0.84–1.19)
IRON SATN MFR SERPL: 13 % (ref 20–50)
IRON SERPL-MCNC: 38 UG/DL (ref 65–175)
LACTATE SERPL-SCNC: 1 MMOL/L (ref 0.5–2)
LIPASE SERPL-CCNC: 211 U/L (ref 73–393)
LYMPHOCYTES # BLD AUTO: 0.6 THOUSANDS/ΜL (ref 0.6–4.47)
LYMPHOCYTES NFR BLD AUTO: 13 % (ref 14–44)
MAGNESIUM SERPL-MCNC: 1.5 MG/DL (ref 1.6–2.6)
MAGNESIUM SERPL-MCNC: 1.8 MG/DL (ref 1.6–2.6)
MCH RBC QN AUTO: 22 PG (ref 26.8–34.3)
MCHC RBC AUTO-ENTMCNC: 31.1 G/DL (ref 31.4–37.4)
MCV RBC AUTO: 71 FL (ref 82–98)
MONOCYTES # BLD AUTO: 0.43 THOUSAND/ΜL (ref 0.17–1.22)
MONOCYTES NFR BLD AUTO: 9 % (ref 4–12)
NEUTROPHILS # BLD AUTO: 3.55 THOUSANDS/ΜL (ref 1.85–7.62)
NEUTS SEG NFR BLD AUTO: 77 % (ref 43–75)
NRBC BLD AUTO-RTO: 0 /100 WBCS
PHOSPHATE SERPL-MCNC: 1.4 MG/DL (ref 2.7–4.5)
PLATELET # BLD AUTO: 79 THOUSANDS/UL (ref 149–390)
PMV BLD AUTO: 9.9 FL (ref 8.9–12.7)
POTASSIUM SERPL-SCNC: 3.3 MMOL/L (ref 3.5–5.3)
POTASSIUM SERPL-SCNC: 4.2 MMOL/L (ref 3.5–5.3)
PROCALCITONIN SERPL-MCNC: 0.15 NG/ML
PROT SERPL-MCNC: 6.5 G/DL (ref 6.4–8.2)
PROTHROMBIN TIME: 14 SECONDS (ref 11.6–14.5)
RBC # BLD AUTO: 3.23 MILLION/UL (ref 3.88–5.62)
SODIUM SERPL-SCNC: 121 MMOL/L (ref 136–145)
SODIUM SERPL-SCNC: 122 MMOL/L (ref 136–145)
THROMBIN TIME: 18 SECONDS (ref 14.7–18.4)
TIBC SERPL-MCNC: 292 UG/DL (ref 250–450)
TSH SERPL DL<=0.05 MIU/L-ACNC: 1.45 UIU/ML (ref 0.45–4.5)
VIT B12 SERPL-MCNC: 1251 PG/ML (ref 100–900)
WBC # BLD AUTO: 4.63 THOUSAND/UL (ref 4.31–10.16)

## 2022-05-21 PROCEDURE — 86140 C-REACTIVE PROTEIN: CPT | Performed by: INTERNAL MEDICINE

## 2022-05-21 PROCEDURE — 83550 IRON BINDING TEST: CPT | Performed by: INTERNAL MEDICINE

## 2022-05-21 PROCEDURE — 85384 FIBRINOGEN ACTIVITY: CPT | Performed by: INTERNAL MEDICINE

## 2022-05-21 PROCEDURE — 85379 FIBRIN DEGRADATION QUANT: CPT | Performed by: INTERNAL MEDICINE

## 2022-05-21 PROCEDURE — 80074 ACUTE HEPATITIS PANEL: CPT | Performed by: INTERNAL MEDICINE

## 2022-05-21 PROCEDURE — 82728 ASSAY OF FERRITIN: CPT | Performed by: INTERNAL MEDICINE

## 2022-05-21 PROCEDURE — 80053 COMPREHEN METABOLIC PANEL: CPT | Performed by: INTERNAL MEDICINE

## 2022-05-21 PROCEDURE — 84443 ASSAY THYROID STIM HORMONE: CPT | Performed by: INTERNAL MEDICINE

## 2022-05-21 PROCEDURE — 84145 PROCALCITONIN (PCT): CPT | Performed by: INTERNAL MEDICINE

## 2022-05-21 PROCEDURE — 83540 ASSAY OF IRON: CPT | Performed by: INTERNAL MEDICINE

## 2022-05-21 PROCEDURE — 85670 THROMBIN TIME PLASMA: CPT | Performed by: INTERNAL MEDICINE

## 2022-05-21 PROCEDURE — 82306 VITAMIN D 25 HYDROXY: CPT | Performed by: INTERNAL MEDICINE

## 2022-05-21 PROCEDURE — 83605 ASSAY OF LACTIC ACID: CPT | Performed by: INTERNAL MEDICINE

## 2022-05-21 PROCEDURE — 82746 ASSAY OF FOLIC ACID SERUM: CPT | Performed by: INTERNAL MEDICINE

## 2022-05-21 PROCEDURE — 80048 BASIC METABOLIC PNL TOTAL CA: CPT | Performed by: INTERNAL MEDICINE

## 2022-05-21 PROCEDURE — 99232 SBSQ HOSP IP/OBS MODERATE 35: CPT | Performed by: INTERNAL MEDICINE

## 2022-05-21 PROCEDURE — 83036 HEMOGLOBIN GLYCOSYLATED A1C: CPT | Performed by: INTERNAL MEDICINE

## 2022-05-21 PROCEDURE — 85018 HEMOGLOBIN: CPT | Performed by: INTERNAL MEDICINE

## 2022-05-21 PROCEDURE — 85025 COMPLETE CBC W/AUTO DIFF WBC: CPT | Performed by: INTERNAL MEDICINE

## 2022-05-21 PROCEDURE — 83735 ASSAY OF MAGNESIUM: CPT | Performed by: INTERNAL MEDICINE

## 2022-05-21 PROCEDURE — 82550 ASSAY OF CK (CPK): CPT | Performed by: INTERNAL MEDICINE

## 2022-05-21 PROCEDURE — 85610 PROTHROMBIN TIME: CPT | Performed by: INTERNAL MEDICINE

## 2022-05-21 PROCEDURE — 83690 ASSAY OF LIPASE: CPT | Performed by: INTERNAL MEDICINE

## 2022-05-21 PROCEDURE — 87389 HIV-1 AG W/HIV-1&-2 AB AG IA: CPT | Performed by: INTERNAL MEDICINE

## 2022-05-21 PROCEDURE — 99255 IP/OBS CONSLTJ NEW/EST HI 80: CPT | Performed by: INTERNAL MEDICINE

## 2022-05-21 PROCEDURE — 83935 ASSAY OF URINE OSMOLALITY: CPT | Performed by: INTERNAL MEDICINE

## 2022-05-21 PROCEDURE — 85014 HEMATOCRIT: CPT | Performed by: INTERNAL MEDICINE

## 2022-05-21 PROCEDURE — 84484 ASSAY OF TROPONIN QUANT: CPT | Performed by: INTERNAL MEDICINE

## 2022-05-21 PROCEDURE — 85730 THROMBOPLASTIN TIME PARTIAL: CPT | Performed by: INTERNAL MEDICINE

## 2022-05-21 PROCEDURE — 82607 VITAMIN B-12: CPT | Performed by: INTERNAL MEDICINE

## 2022-05-21 PROCEDURE — 84100 ASSAY OF PHOSPHORUS: CPT | Performed by: INTERNAL MEDICINE

## 2022-05-21 RX ORDER — POTASSIUM CHLORIDE 20 MEQ/1
40 TABLET, EXTENDED RELEASE ORAL 2 TIMES DAILY WITH MEALS
Status: COMPLETED | OUTPATIENT
Start: 2022-05-21 | End: 2022-05-21

## 2022-05-21 RX ORDER — HYDROMORPHONE HCL/PF 1 MG/ML
1 SYRINGE (ML) INJECTION EVERY 4 HOURS PRN
Status: DISCONTINUED | OUTPATIENT
Start: 2022-05-21 | End: 2022-05-26 | Stop reason: HOSPADM

## 2022-05-21 RX ORDER — OXYCODONE HYDROCHLORIDE 10 MG/1
10 TABLET ORAL EVERY 4 HOURS PRN
Status: DISCONTINUED | OUTPATIENT
Start: 2022-05-21 | End: 2022-05-26 | Stop reason: HOSPADM

## 2022-05-21 RX ORDER — MAGNESIUM SULFATE HEPTAHYDRATE 40 MG/ML
2 INJECTION, SOLUTION INTRAVENOUS ONCE
Status: COMPLETED | OUTPATIENT
Start: 2022-05-21 | End: 2022-05-21

## 2022-05-21 RX ADMIN — HEPARIN SODIUM 5000 UNITS: 5000 INJECTION INTRAVENOUS; SUBCUTANEOUS at 13:05

## 2022-05-21 RX ADMIN — HEPARIN SODIUM 5000 UNITS: 5000 INJECTION INTRAVENOUS; SUBCUTANEOUS at 22:56

## 2022-05-21 RX ADMIN — FOLIC ACID: 5 INJECTION, SOLUTION INTRAMUSCULAR; INTRAVENOUS; SUBCUTANEOUS at 08:02

## 2022-05-21 RX ADMIN — POTASSIUM CHLORIDE 40 MEQ: 20 TABLET, EXTENDED RELEASE ORAL at 08:00

## 2022-05-21 RX ADMIN — PANCRELIPASE 6000 UNITS: 30000; 6000; 19000 CAPSULE, DELAYED RELEASE PELLETS ORAL at 08:00

## 2022-05-21 RX ADMIN — DEXAMETHASONE SODIUM PHOSPHATE 6 MG: 4 INJECTION, SOLUTION INTRAMUSCULAR; INTRAVENOUS at 13:54

## 2022-05-21 RX ADMIN — PANTOPRAZOLE SODIUM 40 MG: 40 TABLET, DELAYED RELEASE ORAL at 05:58

## 2022-05-21 RX ADMIN — HEPARIN SODIUM 5000 UNITS: 5000 INJECTION INTRAVENOUS; SUBCUTANEOUS at 05:58

## 2022-05-21 RX ADMIN — HYDROMORPHONE HYDROCHLORIDE 1 MG: 1 INJECTION, SOLUTION INTRAMUSCULAR; INTRAVENOUS; SUBCUTANEOUS at 13:55

## 2022-05-21 RX ADMIN — POTASSIUM CHLORIDE 40 MEQ: 20 TABLET, EXTENDED RELEASE ORAL at 16:18

## 2022-05-21 RX ADMIN — HYDROMORPHONE HYDROCHLORIDE 0.5 MG: 1 INJECTION, SOLUTION INTRAMUSCULAR; INTRAVENOUS; SUBCUTANEOUS at 05:58

## 2022-05-21 RX ADMIN — REMDESIVIR 100 MG: 100 INJECTION, POWDER, LYOPHILIZED, FOR SOLUTION INTRAVENOUS at 13:05

## 2022-05-21 RX ADMIN — VITAM B12 100 MCG: 100 TAB at 08:00

## 2022-05-21 RX ADMIN — PANCRELIPASE 6000 UNITS: 30000; 6000; 19000 CAPSULE, DELAYED RELEASE PELLETS ORAL at 13:05

## 2022-05-21 RX ADMIN — HYDROMORPHONE HYDROCHLORIDE 1 MG: 1 INJECTION, SOLUTION INTRAMUSCULAR; INTRAVENOUS; SUBCUTANEOUS at 18:50

## 2022-05-21 RX ADMIN — MAGNESIUM SULFATE HEPTAHYDRATE 2 G: 40 INJECTION, SOLUTION INTRAVENOUS at 15:18

## 2022-05-21 RX ADMIN — LEVETIRACETAM 1000 MG: 500 TABLET, FILM COATED ORAL at 08:00

## 2022-05-21 RX ADMIN — PANCRELIPASE 6000 UNITS: 30000; 6000; 19000 CAPSULE, DELAYED RELEASE PELLETS ORAL at 16:18

## 2022-05-21 RX ADMIN — LEVETIRACETAM 1000 MG: 500 TABLET, FILM COATED ORAL at 22:56

## 2022-05-21 RX ADMIN — POTASSIUM PHOSPHATE, MONOBASIC AND POTASSIUM PHOSPHATE, DIBASIC 12 MMOL: 224; 236 INJECTION, SOLUTION, CONCENTRATE INTRAVENOUS at 15:18

## 2022-05-21 RX ADMIN — MULTIPLE VITAMINS W/ MINERALS TAB 1 TABLET: TAB at 08:00

## 2022-05-21 RX ADMIN — CHOLECALCIFEROL TAB 25 MCG (1000 UNIT) 1000 UNITS: 25 TAB at 08:00

## 2022-05-21 RX ADMIN — HYDROMORPHONE HYDROCHLORIDE 1 MG: 1 INJECTION, SOLUTION INTRAMUSCULAR; INTRAVENOUS; SUBCUTANEOUS at 22:55

## 2022-05-21 NOTE — RESPIRATORY THERAPY NOTE
Nurse had stated to me that the patient is now on room air and his oxygen saturations are being maintained over 90%  She stated that he is indeed using the incentive spirometer with good return technique   At this time there is no respiratory intervention needed

## 2022-05-21 NOTE — ASSESSMENT & PLAN NOTE
· Had 5-6 episodes at home over the last few weeks, which he feels may represent seizure activity  · Continue PO keppra  · Consult Neurology  · Monitor for any seizure activity

## 2022-05-21 NOTE — CONSULTS
Consultation - Nephrology   [facility] 55 y o  male MRN: [id number]  Unit/Bed#: 881-77 Encounter: [id number]      A/P:  1  Hyponatremia   Most likely due to SIADH as opposed to other solute abnormality  Continue 1 5 L fluid restriction, I hope is that the patient's serum sodium level can increase by at least 4-5 millimole/L over the next 24 hours  May require stricter fluid intake versus addition of tolvaptan depending on high progresses clinically  At this agent will be made tomorrow morning  Patient had a TSH done at presentation which was normal, will check a serum cortisol level tomorrow morning  2  Probable underlying SIADH   As mentioned above will complete workup with a cortisol level morning, continue fluid restriction of potential need for more strict reduction in total fluid intake over the course of 24 hours  Patient may require tolvaptan going forward  3  Hypokalemia   Agree with aggressive potassium supplementation, patient will receive a total of 80 mEq of oral potassium chloride  4  Hypomagnesemia   Continue with IV magnesium supplementation  5  COVID positive   Patient being provided with aggressive treatment, will defer to hospitalist colleagues regarding need for various medications  6  Alcoholism   Will defer to hospice colleagues regarding withdrawal symptoms, patient claims to drink at least a 6 pack of beer a day, most likely more than that  Thank you for allowing us to participate in the care of your patient  Please feel free to contact us regarding the care of this patient, or any other questions/concerns that may be applicable      Patient Active Problem List   Diagnosis    Tobacco abuse    Essential hypertension    H/O suicide attempt    H/O cervical spine surgery    Hyponatremia    Dietary folate deficiency anemia    Ventral hernia without obstruction or gangrene    Hepatomegaly    Hepatic steatosis    Hypomagnesemia    Elevated alkaline phosphatase level  Alcoholic hepatitis without ascites    Vitamin D deficiency    Iron deficiency    Urinary retention    Bladder wall thickening    Generalized weakness    Malnutrition of moderate degree (HCC)    Hypokalemia    Continuous chronic alcoholism (HCC)    Incisional hernia    Hx of seizure disorder    Seizure-like activity (HCC)    Diarrhea    Abnormality of pancreatic duct    Allergic rhinitis    Microcytic anemia    Abdominal wound dehiscence    Cervical disc disorder with myelopathy    Cervical spinal stenosis    Depression    Left foot drop    Lumbar radiculopathy    Neuropathy involving both lower extremities    Peripheral neuropathy    T6 vertebral fracture (HCC)    Alcoholic cirrhosis of liver without ascites (HCC)    Thrombocytopenia (HCC)    Closed fracture of left olecranon process, initial encounter    Iron deficiency anemia due to chronic blood loss    Duodenal ulcer    Tubular adenoma    Traore esophagus    Celiac artery stenosis (HCC)    Pancreatic mass    Pancytopenia (HCC)    Constipation    Transaminitis    Lesion of spleen    Left anterior fascicular block    Abnormal EKG    Acute on chronic pancreatitis (HCC)    Pancreatic pseudocyst    COPD (chronic obstructive pulmonary disease) (HCC)    Ileus (HCC)    Ambulatory dysfunction    Current every day smoker    Fall    Hx of duodenal ulcer    Neck pain    Alcohol use    Thoracic compression fracture (HCC)    Aortic ectasia (HCC)    Rib fractures    Seizure disorder ([address] )    Hypoxia    Traore's esophagus       History of Present Illness   Physician Requesting Consult: Emily Bell DO  Reason for Consult / Principal Problem:  Hyponatremia  Hx and PE limited by:   HPI: Jesus Brewer is a 55y o  year old male who presented to the emergency department on May 20th due to multiple falls  Patient claims that although he had his head he did not lose consciousness    He claims that this has been an issue for a few days prior to presentation  At the same time, the patient meets to enjoying at least 6 beers a day, knowing his history more likely a significant higher amount than 6 beers a day  Patient denies dysuria, difficulty breathing, other issues  However he admits to generalized weakness, and nausea  From the renal standpoint, the patient had a serum sodium level of 120 millimole/L, I was initially contacted by the attending physician after discussion given his history of nausea and potential reduction in solute intake, patient was given normal saline  Unfortunately patient's serum sodium worsened at which point the IV fluids were discontinued and the patient was transition to a fluid restriction  This morning serum sodium is 121 millimole/L, other electrolyte abnormalities include hypokalemia which has improved from 2 8 millimole/L up to 3 7 and then back down to about 3 3 millimole/L this morning  The patient will be receiving a total of 80 mEq of oral potassium chloride today  Additional electrolyte abnormalities including magnesium at 1 5 mg/dL, patient will be getting 2 g of IV magnesium today  Prior electronic medical records reviewed during the course this consultation  Last time seen by Nephrology was in June of 2018 for hyponatremia which necessitated 1 2 L fluid restriction associated with hypomagnesemia and hypokalemia  History obtained from chart review and the patient    Constitutional ROS- Denies fatigue, fever, chills, night sweats, weight changes  HEENT ROS- Denies history of eye surgeries, glaucoma, headaches or history of trauma, blurred vision     Endocrine ROS- No history diabetes mellitus or thyroid disease  Cardiovascular ROS- Denies chest pain, palpitation, dyspnea exertion, orthopnea, claudication  Pulmonary ROS- Denies history of COPD, asthma  Denies cough, hemoptysis, shortness of breath    GI ROS- Denies abdominal pain, diarrhea, nausea, swallowing problems, vomiting, constipation, blood in stools, fecal incontinence  Hematological ROS- Denies history of easy bruising, blood clots, bleeding or blood transfusions  Genitourinary ROS- Denies recent hematuria, pyuria, flank pain, change in urinary stream, decreased urinary output, increased urinary frequency, nocturia, foamy urine, or urinary incontinence  Lymphatic ROS- Denies lymphadenopathy  Musculoskeletal ROS- Denies history of muscle weakness, joint pain  Dermatological ROS- Denies rash, wounds, ulcers, itching, jaundice  Psychiatric ROS- Denies anxiety, depression, hallucinations, disorientation  Neurological ROS- No stroke or TIA symptoms       Historical Information   Past Medical History:   Diagnosis Date    Alcohol abuse     Traore esophagus     Bowel obstruction (HCC)     Bowel perforation (HCC)     Cardiac disease     Continuous chronic alcoholism ([facility] [address] [facility] 10/5/2017    COPD (chronic obstructive pulmonary disease) (HCC)     History of shoulder surgery     Right shoulder    History of transfusion     Hx of cervical spine surgery     Hypertension     Incisional hernia 10/8/2017    MI, old     Mitral regurgitation     Psychiatric disorder     Seizures ([facility] [address] )      Past Surgical History:   Procedure Laterality Date    APPENDECTOMY      BACK SURGERY      CHOLECYSTECTOMY      ESOPHAGOGASTRODUODENOSCOPY N/A 11/28/2016    Procedure: ESOPHAGOGASTRODUODENOSCOPY (EGD); Surgeon: Hans Trevino MD;  Location: [facility];   Service:     GALLBLADDER SURGERY      LAPAROTOMY N/A 10/25/2016    Procedure: LAPAROTOMY EXPLORATORY;  Surgeon: Blas Silvestre MD;  Location: [facility];  Service:    [facility] MOUTH SURGERY      PERCUTANEOUS PINNING FEMORAL NECK FRACTURE      SHOULDER SURGERY Right     SHOULDER SURGERY      SMALL INTESTINE SURGERY      STOMACH SURGERY      bal surgery     Social History   Social History     Substance and Sexual Activity   Alcohol Use Yes    Alcohol/week: 42 0 standard drinks  Types: 42 Cans of beer per week    Comment: a six pack a day     Social History     Substance and Sexual Activity   Drug Use No     Social History     Tobacco Use   Smoking Status Current Every Day Smoker    Packs/day: 3 00    Years: 35 00    Pack years: 105 00    Types: Cigarettes   Smokeless Tobacco Never Used     Family History   Problem Relation Age of Onset    Breast cancer Mother     Prostate cancer Father     Skin cancer Brother        Meds/Allergies   all current active meds have been reviewed, current meds:   Current Facility-Administered Medications   Medication Dose Route Frequency    albuterol inhalation solution 2 5 mg  2 5 mg Nebulization Q6H PRN    cholecalciferol (VITAMIN D3) tablet 1,000 Units  1,000 Units Oral Daily    cyanocobalamin (VITAMIN B-12) tablet 100 mcg  100 mcg Oral Daily    dexamethasone (DECADRON) injection 6 mg  6 mg Intravenous Q24H    docusate sodium (COLACE) capsule 100 mg  100 mg Oral BID PRN    folic acid 1 mg, thiamine (VITAMIN B1) 100 mg in sodium chloride 0 9 % 100 mL IV piggyback   Intravenous Daily    heparin (porcine) subcutaneous injection 5,000 Units  5,000 Units Subcutaneous Q8H [address]    HYDROmorphone (DILAUDID) injection 1 mg  1 mg Intravenous Q4H PRN    Or    oxyCODONE (ROXICODONE) immediate release tablet 10 mg  10 mg Oral Q4H PRN    levETIRAcetam (KEPPRA) tablet 1,000 mg  1,000 mg Oral Q12H [address]    multivitamin-minerals (CENTRUM) tablet 1 tablet  1 tablet Oral Daily    nicotine (NICODERM CQ) 14 mg/24hr TD 24 hr patch 1 patch  1 patch Transdermal Daily    ondansetron (ZOFRAN) injection 4 mg  4 mg Intravenous Q6H PRN    pancrelipase (Lip-Prot-Amyl) (CREON) delayed release capsule 6,000 Units  6,000 Units Oral TID With Meals    pantoprazole (PROTONIX) EC tablet 40 mg  40 mg Oral Early Morning    potassium chloride (K-DUR,KLOR-CON) CR tablet 40 mEq  40 mEq Oral BID With Meals    remdesivir (Veklury) 100 mg in sodium chloride 0 9 % 270 mL IVPB 100 mg Intravenous Q24H    and PTA meds:    Medications Prior to Admission   Medication    albuterol (PROVENTIL HFA,VENTOLIN HFA) 90 mcg/act inhaler    Cholecalciferol 25 MCG (1000 UT) tablet    dextromethorphan-guaifenesin (MUCINEX DM)  MG per 12 hr tablet    ergocalciferol (VITAMIN D2) 50,000 units    esomeprazole (NexIUM) 40 MG capsule    folic acid (FOLVITE) 1 mg tablet    levETIRAcetam (KEPPRA) 1000 MG tablet    lisinopril (ZESTRIL) 10 mg tablet    Multiple Vitamin (TAB-A-BONI PO)    pancrelipase, Lip-Prot-Amyl, (CREON) 6,000 units delayed release capsule    thiamine 100 MG tablet    albuterol (2 5 mg/3 mL) 0 083 % nebulizer solution    amitriptyline (ELAVIL) 25 mg tablet    cyanocobalamin (VITAMIN B-12) 100 mcg tablet    docusate sodium (COLACE) 100 mg capsule    gabapentin (NEURONTIN) 100 mg capsule    polyethylene glycol (GOLYTELY) 4000 mL solution         No Known Allergies    Objective     Intake/Output Summary (Last 24 hours) at 5/21/2022 1106  Last data filed at 5/20/2022 2318  Gross per 24 hour   Intake 600 ml   Output 250 ml   Net 350 ml       Invasive Devices:        Physical Exam      I/O last 3 completed shifts: In: 600 [P O :600]  Out: 250 [Urine:250]    Vitals:    05/21/22 0828   BP: 115/82   Pulse: 102   Resp: 21   Temp: 97 7 °F (36 5 °C)   SpO2: 93%       General Appearance:    No acute distress  Cooperative  Appears stated age  Head:    Normocephalic  Atraumatic  Normal jaw occlusion  Eyes:    Lids, conjunctiva normal  No scleral icterus  Ears:    Normal external ears  Nose:   Nares normal  No drainage  Mouth:   Lips, tongue normal  Mucosa normal  Phonation normal    Neck:   Supple  Symmetrical    Back:     Symmetric  No CVA tenderness  Lungs:     Normal respiratory effort  Clear to auscultation bilaterally  Chest wall:    No tenderness or deformity  Heart:    Regular rate and rhythm  Normal S1 and S2  No murmur  No JVD  No edema  Abdomen:     Soft  Non-tender  Bowel sounds active  Genitourinary:   No Tabares catheter present  Extremities:   Extremities normal  Atraumatic  No cyanosis  Skin:   Warm and dry  No pallor, jaundice, rash, ecchymoses  Neurologic:   Alert and oriented to person, place, time  No focal deficit  Current Weight: Weight - Scale: 55 3 kg (122 lb)  First Weight: Weight - Scale: 55 3 kg (122 lb)    Lab Results:  I have personally reviewed pertinent labs      CBC:   Lab Results   Component Value Date    WBC 4 63 05/21/2022    HGB 7 1 (L) 05/21/2022    HCT 22 8 (L) 05/21/2022    MCV 71 (L) 05/21/2022    PLT 79 (L) 05/21/2022    MCH 22 0 (L) 05/21/2022    MCHC 31 1 (L) 05/21/2022    RDW 23 6 (H) 05/21/2022    MPV 9 9 05/21/2022    NRBC 0 05/21/2022     CMP:   Lab Results   Component Value Date    K 3 3 (L) 05/21/2022    CL 84 (L) 05/21/2022    CO2 28 05/21/2022    BUN 8 05/21/2022    CREATININE 0 77 05/21/2022    CALCIUM 7 4 (L) 05/21/2022    AST 73 (H) 05/21/2022    ALT 28 05/21/2022    ALKPHOS 111 05/21/2022    EGFR 102 05/21/2022     Phosphorus:   Lab Results   Component Value Date    PHOS 1 4 (L) 05/21/2022     Magnesium:   Lab Results   Component Value Date    MG 1 5 (L) 05/21/2022     Urinalysis:   Lab Results   Component Value Date    COLORU Yellow 05/20/2022    CLARITYU Clear 05/20/2022    SPECGRAV 1 010 05/20/2022    PHUR 6 0 05/20/2022    LEUKOCYTESUR Negative 05/20/2022    NITRITE Negative 05/20/2022    GLUCOSEU Negative 05/20/2022    KETONESU Negative 05/20/2022    BILIRUBINUR Negative 05/20/2022    BLOODU Negative 05/20/2022     Ionized Calcium: No results found for: CAION  Coagulation:   Lab Results   Component Value Date    INR 1 14 05/21/2022     Troponin: No results found for: TROPONINI  ABG: No results found for: PHART, DQH8QLN, PO2ART, ZWJ2KBZ, Q1OWVAFV, BEART, SOURCE    Results from last 7 days   Lab Units 05/21/22  0622 05/20/22  2145 05/20/22  1504 05/20/22  0824   POTASSIUM mmol/L 3 3* 3 7 2 8* 2 3*   CHLORIDE mmol/L 84* 82* 81* 81*   CO2 mmol/L 28 26 27 29   BUN mg/dL 8 10 10 10   CREATININE mg/dL 0 77 0 99 1 08 1 07   CALCIUM mg/dL 7 4* 7 4* 7 4* 7 5*   ALK PHOS U/L 111  --   --  120*   ALT U/L 28  --   --  43   AST U/L 73*  --   --  101*       Radiology review:  Procedure: CT chest abdomen pelvis wo contrast    Result Date: 5/20/2022  Narrative: CT CHEST, ABDOMEN AND PELVIS WITHOUT IV CONTRAST INDICATION:   fall x5 2d prior with thoracic/lumbar/sacral pain and L-sided rib pain 6-8  COMPARISON:  CT of the chest, abdomen and pelvis from October 30, 2021  CT of the abdomen and pelvis from November 22, 2020  History of pancreatitis and pseudocyst formation in 2020  TECHNIQUE: CT examination of the chest, abdomen and pelvis was performed without intravenous contrast  This examination was performed without intravenous contrast in the context of the critical nationwide Omnipaque shortage  Axial, sagittal, and coronal 2D reformatted images were created from the source data and submitted for interpretation  3D reconstructions of the bony thorax were performed in order to improved sensitivity of evaluation for rib fractures  Radiation dose length product (DLP) for this visit:  798 79 mGy-cm   This examination, like all CT scans performed in the [facility], was performed utilizing techniques to minimize radiation dose exposure, including the use of iterative  reconstruction and automated exposure control  Enteric contrast was not administered  Absence of intravenous and gastrointestinal contrast limits evaluation of the abdominal and pelvic viscera  FINDINGS: CHEST LUNGS:  Subsegmental atelectasis in the lingula  Lungs otherwise clear  PLEURA:  Unremarkable  No pleural effusion or pneumothorax  HEART/GREAT VESSELS: Extensive coronary artery calcifications  Heart otherwise unremarkable  There is fusiform ectasia of the ascending thoracic aorta measuring up to 4 0 cm    Recommendation is for follow-up low radiation dose chest CT in one year  Mild  dilatation of the central pulmonary arteries with the main pulmonary artery diameter of 3 1 cm  MEDIASTINUM AND MANE: No lymphadenopathy or mass  No mediastinal hematoma or pneumomediastinum  Esophagus unremarkable  Trachea and main stem bronchi normal  CHEST WALL AND LOWER NECK:  Unremarkable  ABDOMEN LIVER/BILIARY TREE:  Limited evaluation without IV contrast   Generalized decreased attenuation of the liver, indicating fatty change  No gross evidence of hepatic mass or injury  Bile ducts normal in caliber  GALLBLADDER:  Postcholecystectomy  SPLEEN:  Limited evaluation without IV contrast   Multiple calcified granulomas  No evidence of splenic injury  PANCREAS:  Limited evaluation without IV contrast   Calcifications in the pancreatic head and proximal body, indicative of chronic pancreatitis  3 0 x 3 2 cm cyst in the pancreatic head, measuring 3 4 x 3 9 cm on the CT from 10/30/2021  ADRENAL GLANDS:  Unremarkable  KIDNEYS/URETERS:  Limited evaluation without IV contrast   Grossly unremarkable  STOMACH AND BOWEL:  Unremarkable  APPENDIX:  Post appendectomy  ABDOMINOPELVIC CAVITY: No lymphadenopathy or mass  No ascites or discrete fluid collection  No extraluminal gas  VESSELS:  Atherosclerotic changes are present  No evidence of aneurysm  PELVIS REPRODUCTIVE ORGANS: Prostate and seminal vesicles unremarkable for patient's age  URINARY BLADDER:  Bladder wall thickening  Otherwise unremarkable  ABDOMINAL WALL/INGUINAL REGIONS:  Unremarkable  OSSEOUS STRUCTURES:  Status post ORIF of a fracture of the proximal right humerus  Several healed right-sided rib fractures  Recent appearing fractures of the posterior right 9th and 10th ribs at their costovertebral junctions  Chronic compression fractures of T6 and T7  Mild compression fracture of the inferior endplate of T8, age indeterminate, although developing since 10/30/2021  No other evidence of vertebral fracture  Status post lower cervical spine fusion  Subtle deformity of the left inferior pubic ramus, consistent with healed fracture, age indeterminate although developing since 10/30/2021  Impression: 1  Limited examination due to absence of IV contrast  2   3   Mild ectasia of the ascending aorta, measuring up to 4 0 cm  4   Lingular subsegmental atelectasis  5   Hepatic steatosis  6   Persistent pseudocyst in the pancreatic head, decreased in size since a CT from 10/30/2021  7   Nonspecific mild bladder wall thickening  8   No evidence of acute injury in the chest, abdomen or pelvis  9   Chronic compression fractures of T6 and T7  Healed fracture of the left inferior pubic ramus and several healed right-sided rib fractures  10   Mild compression fracture of T8, involving the inferior endplate, age indeterminate although developing since 10/30/2021  11   Recent appearing fractures of the right 9th and 10th ribs at their costovertebral junctions  The study was marked in [facility] for immediate notification  Workstation performed: [id number]     Procedure: XR Trauma chest portable    Result Date: 5/20/2022  Narrative: CHEST INDICATION:   TRAUMA  COMPARISON:  Chest radiograph November 22, 2020 EXAM PERFORMED/VIEWS:  XR CHEST PORTABLE FINDINGS: Cardiomediastinal silhouette appears unremarkable  No focal consolidation, pleural effusion or pneumothorax  Known rib fractures are better seen on the concurrent chest CT  Partially imaged hardware in the right humeral head and cervical spine  Impression: No focal consolidation, pleural effusion, or pneumothorax  Known rib fractures are better seen on the concurrent chest CT  Workstation performed: [id number]     Procedure: TRAUMA - CT head wo contrast    Result Date: 5/20/2022  Narrative: CT BRAIN - WITHOUT CONTRAST INDICATION:   Facial trauma, blunt TRAUMA  COMPARISON:  Head CT November 12, 2009 TECHNIQUE:  CT examination of the brain was performed    In addition to axial images, sagittal and coronal 2D reformatted images were created and submitted for interpretation  Radiation dose length product (DLP) for this visit:  860 86 mGy-cm   This examination, like all CT scans performed in the [facility], was performed utilizing techniques to minimize radiation dose exposure, including the use of iterative  reconstruction and automated exposure control  IMAGE QUALITY:  Diagnostic  FINDINGS: PARENCHYMA: Decreased attenuation is noted in periventricular and subcortical white matter demonstrating an appearance that is statistically most likely to represent mild microangiopathic change  No CT signs of acute infarction  No intracranial mass, mass effect or midline shift  No acute parenchymal hemorrhage  VENTRICLES AND EXTRA-AXIAL SPACES:  Ventricles and extra-axial CSF spaces are prominent commensurate with the degree of moderate volume loss  No hydrocephalus  No acute extra-axial hemorrhage  VISUALIZED ORBITS AND PARANASAL SINUSES:  Pansinus mucosal thickening  No acute orbital findings  CALVARIUM AND EXTRACRANIAL SOFT TISSUES:  Normal      Impression: No acute intracranial abnormality  Microangiopathic changes  Moderate diffuse parenchymal volume loss  Pansinus mucosal thickening  The study was marked in [facility] for immediate notification  Workstation performed: [id number]     Procedure: TRAUMA - CT spine cervical wo contrast    Result Date: 5/20/2022  Narrative: CT CERVICAL SPINE - WITHOUT CONTRAST INDICATION:   TRAUMA  COMPARISON:  CT cervical spine October 30, 2021 TECHNIQUE:  CT examination of the cervical spine was performed without intravenous contrast   Contiguous axial images were obtained  Sagittal and coronal reconstructions were performed  Radiation dose length product (DLP) for this visit:  343 85 mGy-cm     This examination, like all CT scans performed in the [facility], was performed utilizing techniques to minimize radiation dose exposure, including the use of iterative  reconstruction and automated exposure control  IMAGE QUALITY:  Diagnostic  FINDINGS: ALIGNMENT:  Straightening of the mid and lower cervical spine related to surgical fixation  VERTEBRAL BODIES:  No fracture  DEGENERATIVE CHANGES:  Posterior decompression from C4 through C7, with paired vertical rods and screws from C4 through T1  Fracture of the right T1 screw is unchanged (series 601, image 112)  Post anterior cervical discectomy and fusion at C5 through the C7  Pannus formation is noted adjacent to the dens  Moderate degree of degenerative change  Significant left-sided facet arthrosis at C2-C3 has slightly increased since prior study  No spinal canal stenosis  PREVERTEBRAL AND PARASPINAL SOFT TISSUES:  Unremarkable  THORACIC INLET:  Please refer to the concurrent chest, abdomen, and pelvic CT report for description of the thoracic inlet findings  Impression: No cervical spine fracture or traumatic malalignment  Unchanged fracture of the right-sided screw at T1  Hardware is unchanged in appearance  Multilevel degenerative change of the spine  Workstation performed: [id number]     Procedure: CT recon only thoracolumbar (no charge)    Result Date: 5/20/2022  Narrative: CT THORACIC AND LUMBAR SPINE INDICATION:   Back trauma, no prior imaging (Age >= 16y) trauma TRAUMA  72-year-old male with multiple falls 2 days ago  COMPARISON:  CTA of the chest, abdomen and pelvis from October 30, 2021  CTA of the thoracic and lumbar spine from August 11, 2014  TECHNIQUE:  Contiguous axial images were obtained  Sagittal and coronal reconstructions were performed  Radiation dose length product (DLP) for this visit:  0 mGy-cm   This examination, like all CT scans performed in the [facility], was performed utilizing techniques to minimize radiation dose exposure, including the use of iterative reconstruction and automated exposure control  IMAGE QUALITY:  Diagnostic  FINDINGS: ALIGNMENT:  Accentuated thoracic kyphosis  Alignment otherwise normal  VERTEBRAE:  Chronic compression fractures of T6 and T7, unchanged since 10/30/2021  Compression fracture of T8, involving the inferior endplate, with approximately 30% loss of height, age indeterminate, although developing since 10/30/2021  Mild chronic superior endplate depressions at T2 and T3  Mild superior endplate compression at T4, developing since 10/30/2021  Healed fractures of the posterior portions of ribs at the costovertebral junctions, bilaterally  Some were present on 10/30/2021 and others have developed in the interim  None appear to be recent  No bone destruction or osteoblastic lesion  DISC LEVELS:  Mild degenerative disc disease at multiple thoracic and lumbar levels  Claudia Console FACET JOINTS:  Unremarkable  FORAMINA:   Mild to moderate bilateral foraminal stenosis from L3-L4 through L5-S1  SPINAL CANAL:  Mild spinal stenosis at L3-L4 and L4-L5  SACRUM AND SACROILIAC JOINTS:  Fracture of the left sacral ala (see series 5, images 165-175), recent in appearance, not present on 10/30/2021  PARASPINAL SOFT TISSUES:   Normal  IMAGED PORTIONS OF CHEST, ABDOMEN AND PELVIS: Unremarkable  Impression: 1  Chronic compression fractures of T2, T3, T6 and T7  2   Mild compression fractures of T8 4 and T8, age indeterminant, although developing since 10/30/2021  3   Chronic, healed fractures of the posterior portions of several ribs, bilaterally  4   Recent appearing fracture of the left sacral ala  5   Degenerative changes at several thoracic and lumbar levels  The study was marked in [facility] for immediate notification  Workstation performed: Joy Rosenbaum,       This consultation note was produced in part using a dictation device which may document imprecise wording from author's original intent

## 2022-05-21 NOTE — ASSESSMENT & PLAN NOTE
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 also secondary to chronic alcohol abuse  · Also with a component of hypovolemic hyponatremia with nausea and a decreased PO intake over the last few days  · Received NSS IV fluids with 20 mEq KCl at 150 ml/hr for a total of 1000 ml on 05/20/2022  · Initiated a 1500 ml/24 hour fluid restriction on 05/21/2022  · Check a urine sodium level and urine osmolality  · Check a serum osmolality and TSH level  · The patient was seen in consultation by Nephrology    · Follow the sodium level      Results from last 7 days   Lab Units 05/21/22  0622 05/20/22  2145 05/20/22  1504   SODIUM mmol/L 121* 118* 117*   POTASSIUM mmol/L 3 3* 3 7 2 8*   CHLORIDE mmol/L 84* 82* 81*   CO2 mmol/L 28 26 27   BUN mg/dL 8 10 10   CREATININE mg/dL 0 77 0 99 1 08   CALCIUM mg/dL 7 4* 7 4* 7 4*

## 2022-05-21 NOTE — ASSESSMENT & PLAN NOTE
· Requiring 3 lpm of continuous supplemental oxygen to maintain oxygen saturations of 90% and above  · Utilize the "Mild" COVID-19 treatment algorithm  · Received remdesivir 200 mg IV x 1 dose on 05/20/2022 and continue remdesivir 100 mg IV every 24 hours (day #2 of the treatment course)  · Treat with dexamethasone 6 mg IV every 24 hours  · Respiratory protocol  · Incentive spirometry  · Unable to utilize an IV heparin drip based on the elevated D-dimer level and COVID-19 treatment protocol due to the patient having thrombocytopenia and worsening anemia  · Utilize heparin 5000 units SQ every 8 hours for now  · PT/OT

## 2022-05-21 NOTE — ASSESSMENT & PLAN NOTE
· Give potassium chloride 40 mEq PO x 1 dose and potassium phosphate 12 mmol IV x 1 dose on 05/21/2022  · Follow the potassium level    Results from last 7 days   Lab Units 05/21/22  0622 05/20/22 2145 05/20/22  1504   SODIUM mmol/L 121* 118* 117*   POTASSIUM mmol/L 3 3* 3 7 2 8*   CHLORIDE mmol/L 84* 82* 81*   CO2 mmol/L 28 26 27   BUN mg/dL 8 10 10   CREATININE mg/dL 0 77 0 99 1 08   CALCIUM mg/dL 7 4* 7 4* 7 4*

## 2022-05-21 NOTE — ASSESSMENT & PLAN NOTE
· Received a total of 4 grams of IV magnesium sulfate on 05/20/2022  · Give magnesium sulfate 2 grams IV x 1 dose on 05/21/2022  · Follow the magnesium level    Results from last 7 days   Lab Units 05/21/22  0622 05/20/22  1504 05/20/22  0824   MAGNESIUM mg/dL 1 5* 2 4 1 1*

## 2022-05-21 NOTE — PLAN OF CARE
Problem: Nutrition/Hydration-ADULT  Goal: Nutrient/Hydration intake appropriate for improving, restoring or maintaining nutritional needs  Description: Monitor and assess patient's nutrition/hydration status for malnutrition  Collaborate with interdisciplinary team and initiate plan and interventions as ordered  Monitor patient's weight and dietary intake as ordered or per policy  Utilize nutrition screening tool and intervene as necessary  Determine patient's food preferences and provide high-protein, high-caloric foods as appropriate       INTERVENTIONS:  - Monitor oral intake, urinary output, labs, and treatment plans  - Assess nutrition and hydration status and recommend course of action  - Evaluate amount of meals eaten  - Assist patient with eating if necessary   - Allow adequate time for meals  - Recommend/ encourage appropriate diets, oral nutritional supplements, and vitamin/mineral supplements  - Order, calculate, and assess calorie counts as needed  - Recommend, monitor, and adjust tube feedings and TPN/PPN based on assessed needs  - Assess need for intravenous fluids  - Provide specific nutrition/hydration education as appropriate  - Include patient/family/caregiver in decisions related to nutrition  Outcome: Progressing     Problem: MOBILITY - ADULT  Goal: Maintain or return to baseline ADL function  Description: INTERVENTIONS:  -  Assess patient's ability to carry out ADLs; assess patient's baseline for ADL function and identify physical deficits which impact ability to perform ADLs (bathing, care of mouth/teeth, toileting, grooming, dressing, etc )  - Assess/evaluate cause of self-care deficits   - Assess range of motion  - Assess patient's mobility; develop plan if impaired  - Assess patient's need for assistive devices and provide as appropriate  - Encourage maximum independence but intervene and supervise when necessary  - Involve family in performance of ADLs  - Assess for home care needs following discharge   - Consider OT consult to assist with ADL evaluation and planning for discharge  - Provide patient education as appropriate  Outcome: Progressing  Goal: Maintains/Returns to pre admission functional level  Description: INTERVENTIONS:  - Perform BMAT or MOVE assessment daily    - Set and communicate daily mobility goal to care team and patient/family/caregiver  - Collaborate with rehabilitation services on mobility goals if consulted  - Perform Range of Motion 4 times a day  - Reposition patient every 2 hours    - Dangle patient 3 times a day  - Stand patient 3 times a day  - Ambulate patient 3 times a day  - Out of bed to chair 3 times a day   - Out of bed for meals 3 times a day  - Out of bed for toileting  - Record patient progress and toleration of activity level   Outcome: Progressing     Problem: Potential for Falls  Goal: Patient will remain free of falls  Description: INTERVENTIONS:  - Educate patient/family on patient safety including physical limitations  - Instruct patient to call for assistance with activity   - Consult OT/PT to assist with strengthening/mobility   - Keep Call bell within reach  - Keep bed low and locked with side rails adjusted as appropriate  - Keep care items and personal belongings within reach  - Initiate and maintain comfort rounds  - Make Fall Risk Sign visible to staff  - Offer Toileting every 2 Hours, in advance of need  - Initiate/Maintain fall alarm  - Obtain necessary fall risk management equipment: alarm, nonskid socks, yellow bracelet  - Apply yellow socks and bracelet for high fall risk patients  - Consider moving patient to room near nurses station  Outcome: Progressing     Problem: PAIN - ADULT  Goal: Verbalizes/displays adequate comfort level or baseline comfort level  Description: Interventions:  - Encourage patient to monitor pain and request assistance  - Assess pain using appropriate pain scale  - Administer analgesics based on type and severity of pain and evaluate response  - Implement non-pharmacological measures as appropriate and evaluate response  - Consider cultural and social influences on pain and pain management  - Notify physician/advanced practitioner if interventions unsuccessful or patient reports new pain  Outcome: Progressing     Problem: INFECTION - ADULT  Goal: Absence or prevention of progression during hospitalization  Description: INTERVENTIONS:  - Assess and monitor for signs and symptoms of infection  - Monitor lab/diagnostic results  - Monitor all insertion sites, i e  indwelling lines, tubes, and drains  - Monitor endotracheal if appropriate and nasal secretions for changes in amount and color  - [place] appropriate cooling/warming therapies per order  - Administer medications as ordered  - Instruct and encourage patient and family to use good hand hygiene technique  - Identify and instruct in appropriate isolation precautions for identified infection/condition  Outcome: Progressing     Problem: SAFETY ADULT  Goal: Maintain or return to baseline ADL function  Description: INTERVENTIONS:  -  Assess patient's ability to carry out ADLs; assess patient's baseline for ADL function and identify physical deficits which impact ability to perform ADLs (bathing, care of mouth/teeth, toileting, grooming, dressing, etc )  - Assess/evaluate cause of self-care deficits   - Assess range of motion  - Assess patient's mobility; develop plan if impaired  - Assess patient's need for assistive devices and provide as appropriate  - Encourage maximum independence but intervene and supervise when necessary  - Involve family in performance of ADLs  - Assess for home care needs following discharge   - Consider OT consult to assist with ADL evaluation and planning for discharge  - Provide patient education as appropriate  Outcome: Progressing  Goal: Maintains/Returns to pre admission functional level  Description: INTERVENTIONS:  - Perform BMAT or MOVE assessment daily    - Set and communicate daily mobility goal to care team and patient/family/caregiver  - Collaborate with rehabilitation services on mobility goals if consulted  - Perform Range of Motion 4 times a day  - Reposition patient every 2 hours    - Dangle patient 3 times a day  - Stand patient 3 times a day  - Ambulate patient 3 times a day  - Out of bed to chair 3 times a day   - Out of bed for meals 3 times a day  - Out of bed for toileting  - Record patient progress and toleration of activity level   Outcome: Progressing  Goal: Patient will remain free of falls  Description: INTERVENTIONS:  - Educate patient/family on patient safety including physical limitations  - Instruct patient to call for assistance with activity   - Consult OT/PT to assist with strengthening/mobility   - Keep Call bell within reach  - Keep bed low and locked with side rails adjusted as appropriate  - Keep care items and personal belongings within reach  - Initiate and maintain comfort rounds  - Make Fall Risk Sign visible to staff  - Offer Toileting every 2 Hours, in advance of need  - Initiate/Maintain fall alarm  - Obtain necessary fall risk management equipment: alarm, nonskid socks, yellow bracelet  - Apply yellow socks and bracelet for high fall risk patients  - Consider moving patient to room near nurses station  Outcome: Progressing     Problem: DISCHARGE PLANNING  Goal: Discharge to home or other facility with appropriate resources  Description: INTERVENTIONS:  - Identify barriers to discharge w/patient and caregiver  - Arrange for needed discharge resources and transportation as appropriate  - Identify discharge learning needs (meds, wound care, etc )  - Arrange for interpretive services to assist at discharge as needed  - Refer to Case Management Department for coordinating discharge planning if the patient needs post-hospital services based on physician/advanced practitioner order or complex needs related to functional status, cognitive ability, or social support system  Outcome: Progressing     Problem: Knowledge Deficit  Goal: Patient/family/caregiver demonstrates understanding of disease process, treatment plan, medications, and discharge instructions  Description: Complete learning assessment and assess knowledge base    Interventions:  - Provide teaching at level of understanding  - Provide teaching via preferred learning methods  Outcome: Progressing     Problem: RESPIRATORY - ADULT  Goal: Achieves optimal ventilation and oxygenation  Description: INTERVENTIONS:  - Assess for changes in respiratory status  - Assess for changes in mentation and behavior  - Position to facilitate oxygenation and minimize respiratory effort  - Oxygen administered by appropriate delivery if ordered  - Initiate smoking cessation education as indicated  - Encourage broncho-pulmonary hygiene including cough, deep breathe, Incentive Spirometry  - Assess the need for suctioning and aspirate as needed  - Assess and instruct to report SOB or any respiratory difficulty  - Respiratory Therapy support as indicated  Outcome: Progressing     Problem: GENITOURINARY - ADULT  Goal: Maintains or returns to baseline urinary function  Description: INTERVENTIONS:  - Assess urinary function  - Encourage oral fluids to ensure adequate hydration if ordered  - Administer IV fluids as ordered to ensure adequate hydration  - Administer ordered medications as needed  - Offer frequent toileting  - Follow urinary retention protocol if ordered  Outcome: Progressing  Goal: Absence of urinary retention  Description: INTERVENTIONS:  - Assess patients ability to void and empty bladder  - Monitor I/O  - Bladder scan as needed  - Discuss with physician/AP medications to alleviate retention as needed  - Discuss catheterization for long term situations as appropriate  Outcome: Progressing     Problem: METABOLIC, FLUID AND ELECTROLYTES - ADULT  Goal: Electrolytes maintained within normal limits  Description: INTERVENTIONS:  - Monitor labs and assess patient for signs and symptoms of electrolyte imbalances  - Administer electrolyte replacement as ordered  - Monitor response to electrolyte replacements, including repeat lab results as appropriate  - Instruct patient on fluid and nutrition as appropriate  Outcome: Progressing  Goal: Fluid balance maintained  Description: INTERVENTIONS:  - Monitor labs   - Monitor I/O and WT  - Instruct patient on fluid and nutrition as appropriate  - Assess for signs & symptoms of volume excess or deficit  Outcome: Progressing     Problem: MUSCULOSKELETAL - ADULT  Goal: Maintain or return mobility to safest level of function  Description: INTERVENTIONS:  - Assess patient's ability to carry out ADLs; assess patient's baseline for ADL function and identify physical deficits which impact ability to perform ADLs (bathing, care of mouth/teeth, toileting, grooming, dressing, etc )  - Assess/evaluate cause of self-care deficits   - Assess range of motion  - Assess patient's mobility  - Assess patient's need for assistive devices and provide as appropriate  - Encourage maximum independence but intervene and supervise when necessary  - Involve family in performance of ADLs  - Assess for home care needs following discharge   - Consider OT consult to assist with ADL evaluation and planning for discharge  - Provide patient education as appropriate  Outcome: Progressing  Goal: Maintain proper alignment of affected body part  Description: INTERVENTIONS:  - Support, maintain and protect limb and body alignment  - Provide patient/ family with appropriate education  Outcome: Progressing

## 2022-05-21 NOTE — ASSESSMENT & PLAN NOTE
· Check an iron panel, vitamin B12 level, and folate level  · Check the patient's stool for occult blood x 3 specimens  · Follow the CBC  · Transfuse for a hemoglobin less than 7 g/dl    Results from last 7 days   Lab Units 05/21/22  0622 05/20/22  0824   WBC Thousand/uL 4 63 5 22   HEMOGLOBIN g/dL 7 1* 8 5*   HEMATOCRIT % 22 8* 26 3*   PLATELETS Thousands/uL 79* 86*

## 2022-05-21 NOTE — ASSESSMENT & PLAN NOTE
· Due the COVID-19 virus infection and rib fractures  · Currently requiring 3 lpm of continuous supplemental oxygen to maintain oxygen saturation levels at 92% and above  · Rib fracture protocol  · Incentive spirometry  · Rib fracture protocol  · Please see the assessment and plan for "COVID-19 virus infection"

## 2022-05-21 NOTE — ASSESSMENT & PLAN NOTE
· Right-sided rib fractures of the 9th rib and 10th rib  · Rib fracture protocol  · Pain control    CT scan of the chest/abdomen/pelvis (05/20/2022): Several healed right-sided rib fractures  Recent appearing fractures of the posterior right 9th and 10th ribs at their costovertebral junctions

## 2022-05-21 NOTE — PROGRESS NOTES
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 also secondary to chronic alcohol abuse  · Also with a component of hypovolemic hyponatremia with nausea and a decreased PO intake over the last few days  · Received NSS IV fluids with 20 mEq KCl at 150 ml/hr for a total of 1000 ml on 05/20/2022  · Initiated a 1500 ml/24 hour fluid restriction on 05/21/2022  · Check a urine sodium level and urine osmolality  · Check a serum osmolality and TSH level  · The patient was seen in consultation by Nephrology    · Follow the sodium level      Results from last 7 days   Lab Units 05/21/22  0622 05/20/22  2145 05/20/22  1504   SODIUM mmol/L 121* 118* 117*   POTASSIUM mmol/L 3 3* 3 7 2 8*   CHLORIDE mmol/L 84* 82* 81*   CO2 mmol/L 28 26 27   BUN mg/dL 8 10 10   CREATININE mg/dL 0 77 0 99 1 08   CALCIUM mg/dL 7 4* 7 4* 7 4*         COVID-19 virus infection  Assessment & Plan  · Requiring 3 lpm of continuous supplemental oxygen to maintain oxygen saturations of 90% and above  · Utilize the "Mild" COVID-19 treatment algorithm  · Received remdesivir 200 mg IV x 1 dose on 05/20/2022 and continue remdesivir 100 mg IV every 24 hours (day #2 of the treatment course)  · Treat with dexamethasone 6 mg IV every 24 hours (day #2)  · Respiratory protocol  · Incentive spirometry  · Unable to utilize an IV heparin drip based on the elevated D-dimer level and COVID-19 treatment protocol due to the patient having thrombocytopenia and worsening anemia  · Utilize heparin 5000 units SQ every 8 hours for now  · PT/OT    Hypoxia  Assessment & Plan  · Due the COVID-19 virus infection and rib fractures  · Currently requiring 3 lpm of continuous supplemental oxygen to maintain oxygen saturation levels at 92% and above  · Rib fracture protocol  · Incentive spirometry  · Please see the assessment and plan for "COVID-19 virus infection"    Seizure disorder [facility])  Assessment & Plan  · Had 5-6 episodes at home over the last few weeks, which he feels may represent seizure activity  · Continue PO keppra  · Consult Neurology  · Monitor for any seizure activity    Hypomagnesemia  Assessment & Plan  · Received a total of 4 grams of IV magnesium sulfate on 05/20/2022  · Give magnesium sulfate 2 grams IV x 1 dose on 05/21/2022  · Follow the magnesium level    Results from last 7 days   Lab Units 05/21/22  0622 05/20/22  1504 05/20/22  0824   MAGNESIUM mg/dL 1 5* 2 4 1 1*         Alcohol use  Assessment & Plan  · Monitor for signs of delirium tremens  · Utilize the CIWA protocol  · Utilize folic acid 1 mg IV Qdaily and thiamine 100 mg IV Qdaily  · Daily PO multivitamin    Thrombocytopenia (HCC)  Assessment & Plan  · Likely due to liver disease  · Monitor for any signs of bleeding  · Follow the platelet count  · Outpatient Hematology/Oncology evaluation    Results from last 7 days   Lab Units 05/21/22  0622 05/20/22  0824   WBC Thousand/uL 4 63 5 22   HEMOGLOBIN g/dL 7 1* 8 5*   HEMATOCRIT % 22 8* 26 3*   PLATELETS Thousands/uL 79* 86*         Elevated d-dimer  Assessment & Plan  · In the setting of the COVID-19 virus infection  · Check a venous duplex of the bilateral lower extremities  · Check a V/Q lung scan in place of a CTA of the chest with the IV contrast dye shortage  · Cannot be fully anticoagulated this time with the worsening anemia as well as with thrombocytopenia     Latest Reference Range & Units 05/21/22 06:22   D-Dimer, Quant <0 50 ug/ml FEU 1 37 (H) [1]   (H): Data is abnormally high  [1] Reference and upper limits to exclude DVT and PE are the same  Do not use to exclude if clinical symptoms are present  Traore's esophagus  Assessment & Plan  · Continue PPI therapy  · Outpatient surveillance EGD's with Gastroenterology    Rib fractures  Assessment & Plan  · Right-sided rib fractures of the 9th rib and 10th rib  · Rib fracture protocol  · Pain control    CT scan of the chest/abdomen/pelvis (05/20/2022): Several healed right-sided rib fractures  Recent appearing fractures of the posterior right 9th and 10th ribs at their costovertebral junctions  Aortic ectasia [facility])  Assessment & Plan  · Outpatient Thoracic Surgery evaluation    CT scan of the chest/abdomen/pelvis (05/20/2022): Mild ectasia of the ascending aorta, measuring up to 4 0 cm  Thoracic compression fracture [facility])  Assessment & Plan  · Multiple compression fractures  · Needs an outpatient DEXA scan with his PCP  · PT/OT    CT scan of the chest/abdomen/pelvis (05/20/2022): IMPRESSION:     1  Chronic compression fractures of T2, T3, T6 and T7      2   Mild compression fractures of T8 4 and T8, age indeterminant, although developing since 10/30/2021  Pancreatic pseudocyst  Assessment & Plan  · Outpatient surveillance imaging with Gastroenterology  · Check a CA 19-9 level    CT scan of the chest/abdomen/pelvis (05/20/2022):  PANCREAS:  Limited evaluation without IV contrast   Calcifications in the pancreatic head and proximal body, indicative of chronic pancreatitis  3 0 x 3 2 cm cyst in the pancreatic head, measuring 3 4 x 3 9 cm on the CT from 10/30/2021      Abnormal EKG  Assessment & Plan  · Outpatient Cardiology evaluation    Microcytic anemia  Assessment & Plan  · Check an iron panel, vitamin B12 level, and folate level  · Check the patient's stool for occult blood x 3 specimens  · Follow the CBC  · Transfuse for a hemoglobin less than 7 g/dl    Results from last 7 days   Lab Units 05/21/22  0622 05/20/22  0824   WBC Thousand/uL 4 63 5 22   HEMOGLOBIN g/dL 7 1* 8 5*   HEMATOCRIT % 22 8* 26 3*   PLATELETS Thousands/uL 79* 86*         Hypokalemia  Assessment & Plan  · Give potassium chloride 40 mEq PO x 1 dose and potassium phosphate 12 mmol IV x 1 dose on 05/21/2022  · Follow the potassium level    Results from last 7 days   Lab Units 05/21/22  0622 05/20/22  2145 05/20/22  1504   SODIUM mmol/L 121* 118* 117*   POTASSIUM mmol/L 3 3* 3 7 2 8*   CHLORIDE mmol/L 84* 82* 81*   CO2 mmol/L 28 26 27   BUN mg/dL 8 10 10   CREATININE mg/dL 0 77 0 99 1 08   CALCIUM mg/dL 7 4* 7 4* 7 4*         Hypophosphatemia  Assessment & Plan  · Give potassium phosphate 12 mmol IV x 1 dose on 05/21/2022  · Follow the phosphorus level    Bladder wall thickening  Assessment & Plan  · Check a urinalysis and urine culture  · Outpatient Urology evaluation    Vitamin D deficiency  Assessment & Plan  · Continue cholecalciferol supplementation    Hepatic steatosis  Assessment & Plan  · Due to alcohol abuse  · Avoid all hepatotoxic agents  · Outpatient surveillance imaging with Gastroenterology    Tobacco abuse  Assessment & Plan  · Nicotine patch  · Smoking cessation counseling      VTE Pharmacologic Prophylaxis: VTE Score: 9 High Risk (Score >/= 5) - Pharmacological DVT Prophylaxis Ordered: heparin  Sequential Compression Devices Ordered  Patient Centered Rounds: I performed bedside rounds with nursing staff today  Discussions with Specialists or Other Care Team Provider: I discussed the case with Nephrology  Education and Discussions with Family / Patient: Patient declined call to   Time Spent for Care: 30 minutes  More than 50% of total time spent on counseling and coordination of care as described above      Current Length of Stay: 1 day(s)  Current Patient Status: Inpatient   Certification Statement: The patient will continue to require additional inpatient hospital stay due to the need for IV dexamethasone treatment, for IV remdesivir treatment, for treatment of the hyponatremia, and for serial laboratory testing to monitor his sodium level  Discharge Plan: Anticipate discharge in 48-72 hrs to home  Code Status: Level 1 - Full Code    Subjective: The patient was seen and examined  The patient complains of chest pain and thoracic back pain  No shortness of breath  No abdominal pain  No nausea or vomiting  Objective:     Vitals:   Temp (24hrs), Av 7 °F (36 5 °C), Min:97 4 °F (36 3 °C), Max:97 9 °F (36 6 °C)    Temp:  [97 4 °F (36 3 °C)-97 9 °F (36 6 °C)] 97 7 °F (36 5 °C)  HR:  [] 102  Resp:  [16-21] 21  BP: (115-135)/(81-82) 115/82  SpO2:  [92 %-100 %] 93 %  Body mass index is 19 69 kg/m²  Input and Output Summary (last 24 hours):      Intake/Output Summary (Last 24 hours) at 2022 1445  Last data filed at 2022 2318  Gross per 24 hour   Intake 300 ml   Output 250 ml   Net 50 ml       Physical Exam:   Physical Exam   General:  NAD, follows commands  HEENT:  NC/AT, mucous membranes moist  Neck:  Supple, No JVP elevation  CV:  + S1, + S2, Tachycardic, Regular rhythm  Pulm:  Lung fields are CTA bilaterally  Abd:  Soft, Non-tender, Non-distended  Ext:  No clubbing/cyanosis/edema  Skin:  No rashes  Neuro:  Awake, alert, oriented  Psych:  Normal mood and affect      Additional Data:    Labs:  Results from last 7 days   Lab Units 22  0622   WBC Thousand/uL 4 63   HEMOGLOBIN g/dL 7 1*   HEMATOCRIT % 22 8*   PLATELETS Thousands/uL 79*   NEUTROS PCT % 77*   LYMPHS PCT % 13*   MONOS PCT % 9   EOS PCT % 0     Results from last 7 days   Lab Units 22  0622   SODIUM mmol/L 121*   POTASSIUM mmol/L 3 3*   CHLORIDE mmol/L 84*   CO2 mmol/L 28   BUN mg/dL 8   CREATININE mg/dL 0 77   ANION GAP mmol/L 9   CALCIUM mg/dL 7 4*   ALBUMIN g/dL 2 7*   TOTAL BILIRUBIN mg/dL 0 87   ALK PHOS U/L 111   ALT U/L 28   AST U/L 73*   GLUCOSE RANDOM mg/dL 120     Results from last 7 days   Lab Units 22  0622   INR  1 14 Results from last 7 days   Lab Units 05/21/22  0622   LACTIC ACID mmol/L 1 0   PROCALCITONIN ng/ml 0 15       Lines/Drains:  Invasive Devices  Report    Peripheral Intravenous Line  Duration           Peripheral IV 05/20/22 Right Antecubital 1 day    Peripheral IV 05/20/22 Left;Ventral (anterior) Forearm <1 day                  Telemetry:  Telemetry Orders (From admission, onward)             48 Hour Telemetry Monitoring  Continuous x 48 hours        References:    Telemetry Guidelines   Question:  Reason for 48 Hour Telemetry  Answer:  Arrhythmias Requiring Medical Therapy (eg  SVT, Vtach/fib, Bradycardia, Uncontrolled A-fib)                 Telemetry Reviewed: Sinus Tachycardia  Indication for Continued Telemetry Use: Arrthymias requiring medical therapy             Imaging: No pertinent imaging reviewed      Recent Cultures (last 7 days):         Last 24 Hours Medication List:   Current Facility-Administered Medications   Medication Dose Route Frequency Provider Last Rate    albuterol  2 5 mg Nebulization Q6H PRN [place] Tatianayamel Meansr, DO      cholecalciferol  1,000 Units Oral Daily [facility], DO      cyanocobalamin  100 mcg Oral Daily [facility], DO      dexamethasone  6 mg Intravenous Q24H [place] Tatianayamel Meansr, DO      docusate sodium  100 mg Oral BID PRN [facility], DO      folic acid 1 mg, thiamine 100 mg in 0 9% sodium chloride 100 mL IVPB   Intravenous Daily [facility], DO      heparin (porcine)  5,000 Units Subcutaneous [facility], DO      HYDROmorphone  1 mg Intravenous Q4H PRN Patricio Tatianayamel Meansr, DO      Huey Lynch oxyCODONE  10 mg Oral Q4H PRN [facility], DO      levETIRAcetam  1,000 mg Oral Q12H [address] Patricio [facility], DO      magnesium sulfate  2 g Intravenous Once German Priestly, DO      multivitamin-minerals  1 tablet Oral Daily [facility], DO      nicotine  1 patch Transdermal Daily [facility], DO      ondansetron  4 mg Intravenous Q6H PRN Dave Salinas, DO      pancrelipase (Lip-Prot-Amyl)  6,000 Units Oral TID With Meals [facility], DO      pantoprazole  40 mg Oral Early Morning Dave Salinas, DO      potassium chloride  40 mEq Oral BID With Meals Ricarda Marc, DO      potassium phosphate  12 mmol Intravenous Once [facility], DO      remdesivir  100 mg Intravenous Q24H [facility], DO          Today, Patient Was Seen By: [facility], DO    **Please Note: This note may have been constructed using a voice recognition system  **

## 2022-05-21 NOTE — PLAN OF CARE
Problem: Nutrition/Hydration-ADULT  Goal: Nutrient/Hydration intake appropriate for improving, restoring or maintaining nutritional needs  Description: Monitor and assess patient's nutrition/hydration status for malnutrition  Collaborate with interdisciplinary team and initiate plan and interventions as ordered  Monitor patient's weight and dietary intake as ordered or per policy  Utilize nutrition screening tool and intervene as necessary  Determine patient's food preferences and provide high-protein, high-caloric foods as appropriate       INTERVENTIONS:  - Monitor oral intake, urinary output, labs, and treatment plans  - Assess nutrition and hydration status and recommend course of action  - Evaluate amount of meals eaten  - Assist patient with eating if necessary   - Allow adequate time for meals  - Recommend/ encourage appropriate diets, oral nutritional supplements, and vitamin/mineral supplements  - Order, calculate, and assess calorie counts as needed  - Recommend, monitor, and adjust tube feedings and TPN/PPN based on assessed needs  - Assess need for intravenous fluids  - Provide specific nutrition/hydration education as appropriate  - Include patient/family/caregiver in decisions related to nutrition  Outcome: Progressing     Problem: MOBILITY - ADULT  Goal: Maintain or return to baseline ADL function  Description: INTERVENTIONS:  -  Assess patient's ability to carry out ADLs; assess patient's baseline for ADL function and identify physical deficits which impact ability to perform ADLs (bathing, care of mouth/teeth, toileting, grooming, dressing, etc )  - Assess/evaluate cause of self-care deficits   - Assess range of motion  - Assess patient's mobility; develop plan if impaired  - Assess patient's need for assistive devices and provide as appropriate  - Encourage maximum independence but intervene and supervise when necessary  - Involve family in performance of ADLs  - Assess for home care needs following discharge   - Consider OT consult to assist with ADL evaluation and planning for discharge  - Provide patient education as appropriate  Outcome: Progressing  Goal: Maintains/Returns to pre admission functional level  Description: INTERVENTIONS:  - Perform BMAT or MOVE assessment daily    - Set and communicate daily mobility goal to care team and patient/family/caregiver  - Collaborate with rehabilitation services on mobility goals if consulted  - Perform Range of Motion 4 times a day  - Reposition patient every 2 hours    - Dangle patient 3 times a day  - Stand patient 3 times a day  - Ambulate patient 3 times a day  - Out of bed to chair 3 times a day   - Out of bed for meals 3 times a day  - Out of bed for toileting  - Record patient progress and toleration of activity level   Outcome: Progressing     Problem: Potential for Falls  Goal: Patient will remain free of falls  Description: INTERVENTIONS:  - Educate patient/family on patient safety including physical limitations  - Instruct patient to call for assistance with activity   - Consult OT/PT to assist with strengthening/mobility   - Keep Call bell within reach  - Keep bed low and locked with side rails adjusted as appropriate  - Keep care items and personal belongings within reach  - Initiate and maintain comfort rounds  - Make Fall Risk Sign visible to staff  - Offer Toileting every 2 Hours, in advance of need  - Initiate/Maintain fall alarm  - Obtain necessary fall risk management equipment: alarm, nonskid socks, yellow bracelet  - Apply yellow socks and bracelet for high fall risk patients  - Consider moving patient to room near nurses station  Outcome: Progressing     Problem: PAIN - ADULT  Goal: Verbalizes/displays adequate comfort level or baseline comfort level  Description: Interventions:  - Encourage patient to monitor pain and request assistance  - Assess pain using appropriate pain scale  - Administer analgesics based on type and severity of pain and evaluate response  - Implement non-pharmacological measures as appropriate and evaluate response  - Consider cultural and social influences on pain and pain management  - Notify physician/advanced practitioner if interventions unsuccessful or patient reports new pain  Outcome: Progressing     Problem: INFECTION - ADULT  Goal: Absence or prevention of progression during hospitalization  Description: INTERVENTIONS:  - Assess and monitor for signs and symptoms of infection  - Monitor lab/diagnostic results  - Monitor all insertion sites, i e  indwelling lines, tubes, and drains  - Monitor endotracheal if appropriate and nasal secretions for changes in amount and color  - Glidden appropriate cooling/warming therapies per order  - Administer medications as ordered  - Instruct and encourage patient and family to use good hand hygiene technique  - Identify and instruct in appropriate isolation precautions for identified infection/condition  Outcome: Progressing     Problem: SAFETY ADULT  Goal: Maintain or return to baseline ADL function  Description: INTERVENTIONS:  -  Assess patient's ability to carry out ADLs; assess patient's baseline for ADL function and identify physical deficits which impact ability to perform ADLs (bathing, care of mouth/teeth, toileting, grooming, dressing, etc )  - Assess/evaluate cause of self-care deficits   - Assess range of motion  - Assess patient's mobility; develop plan if impaired  - Assess patient's need for assistive devices and provide as appropriate  - Encourage maximum independence but intervene and supervise when necessary  - Involve family in performance of ADLs  - Assess for home care needs following discharge   - Consider OT consult to assist with ADL evaluation and planning for discharge  - Provide patient education as appropriate  Outcome: Progressing  Goal: Maintains/Returns to pre admission functional level  Description: INTERVENTIONS:  - Perform BMAT or MOVE assessment daily    - Set and communicate daily mobility goal to care team and patient/family/caregiver  - Collaborate with rehabilitation services on mobility goals if consulted  - Perform Range of Motion 4 times a day  - Reposition patient every 2 hours    - Dangle patient 3 times a day  - Stand patient 3 times a day  - Ambulate patient 3 times a day  - Out of bed to chair 3 times a day   - Out of bed for meals 3 times a day  - Out of bed for toileting  - Record patient progress and toleration of activity level   Outcome: Progressing  Goal: Patient will remain free of falls  Description: INTERVENTIONS:  - Educate patient/family on patient safety including physical limitations  - Instruct patient to call for assistance with activity   - Consult OT/PT to assist with strengthening/mobility   - Keep Call bell within reach  - Keep bed low and locked with side rails adjusted as appropriate  - Keep care items and personal belongings within reach  - Initiate and maintain comfort rounds  - Make Fall Risk Sign visible to staff  - Offer Toileting every 2 Hours, in advance of need  - Initiate/Maintain fall alarm  - Obtain necessary fall risk management equipment: alarm, nonskid socks, yellow bracelet  - Apply yellow socks and bracelet for high fall risk patients  - Consider moving patient to room near nurses station  Outcome: Progressing     Problem: DISCHARGE PLANNING  Goal: Discharge to home or other facility with appropriate resources  Description: INTERVENTIONS:  - Identify barriers to discharge w/patient and caregiver  - Arrange for needed discharge resources and transportation as appropriate  - Identify discharge learning needs (meds, wound care, etc )  - Arrange for interpretive services to assist at discharge as needed  - Refer to Case Management Department for coordinating discharge planning if the patient needs post-hospital services based on physician/advanced practitioner order or complex needs related to functional status, cognitive ability, or social support system  Outcome: Progressing     Problem: Knowledge Deficit  Goal: Patient/family/caregiver demonstrates understanding of disease process, treatment plan, medications, and discharge instructions  Description: Complete learning assessment and assess knowledge base    Interventions:  - Provide teaching at level of understanding  - Provide teaching via preferred learning methods  Outcome: Progressing     Problem: RESPIRATORY - ADULT  Goal: Achieves optimal ventilation and oxygenation  Description: INTERVENTIONS:  - Assess for changes in respiratory status  - Assess for changes in mentation and behavior  - Position to facilitate oxygenation and minimize respiratory effort  - Oxygen administered by appropriate delivery if ordered  - Initiate smoking cessation education as indicated  - Encourage broncho-pulmonary hygiene including cough, deep breathe, Incentive Spirometry  - Assess the need for suctioning and aspirate as needed  - Assess and instruct to report SOB or any respiratory difficulty  - Respiratory Therapy support as indicated  Outcome: Progressing     Problem: GENITOURINARY - ADULT  Goal: Maintains or returns to baseline urinary function  Description: INTERVENTIONS:  - Assess urinary function  - Encourage oral fluids to ensure adequate hydration if ordered  - Administer IV fluids as ordered to ensure adequate hydration  - Administer ordered medications as needed  - Offer frequent toileting  - Follow urinary retention protocol if ordered  Outcome: Progressing  Goal: Absence of urinary retention  Description: INTERVENTIONS:  - Assess patients ability to void and empty bladder  - Monitor I/O  - Bladder scan as needed  - Discuss with physician/AP medications to alleviate retention as needed  - Discuss catheterization for long term situations as appropriate  Outcome: Progressing     Problem: METABOLIC, FLUID AND ELECTROLYTES - ADULT  Goal: Electrolytes maintained within normal limits  Description: INTERVENTIONS:  - Monitor labs and assess patient for signs and symptoms of electrolyte imbalances  - Administer electrolyte replacement as ordered  - Monitor response to electrolyte replacements, including repeat lab results as appropriate  - Instruct patient on fluid and nutrition as appropriate  Outcome: Progressing  Goal: Fluid balance maintained  Description: INTERVENTIONS:  - Monitor labs   - Monitor I/O and WT  - Instruct patient on fluid and nutrition as appropriate  - Assess for signs & symptoms of volume excess or deficit  Outcome: Progressing     Problem: MUSCULOSKELETAL - ADULT  Goal: Maintain or return mobility to safest level of function  Description: INTERVENTIONS:  - Assess patient's ability to carry out ADLs; assess patient's baseline for ADL function and identify physical deficits which impact ability to perform ADLs (bathing, care of mouth/teeth, toileting, grooming, dressing, etc )  - Assess/evaluate cause of self-care deficits   - Assess range of motion  - Assess patient's mobility  - Assess patient's need for assistive devices and provide as appropriate  - Encourage maximum independence but intervene and supervise when necessary  - Involve family in performance of ADLs  - Assess for home care needs following discharge   - Consider OT consult to assist with ADL evaluation and planning for discharge  - Provide patient education as appropriate  Outcome: Progressing  Goal: Maintain proper alignment of affected body part  Description: INTERVENTIONS:  - Support, maintain and protect limb and body alignment  - Provide patient/ family with appropriate education  Outcome: Progressing

## 2022-05-21 NOTE — ASSESSMENT & PLAN NOTE
· In the setting of the COVID-19 virus infection  · Check a venous duplex of the bilateral lower extremities  · Check a V/Q lung scan in place of a CTA of the chest with the IV contrast dye shortage  · Cannot be fully anticoagulated this time with the worsening anemia as well as with thrombocytopenia     Latest Reference Range & Units 05/21/22 06:22   D-Dimer, Quant <0 50 ug/ml FEU 1 37 (H) [1]   (H): Data is abnormally high  [1] Reference and upper limits to exclude DVT and PE are the same  Do not use to exclude if clinical symptoms are present

## 2022-05-21 NOTE — ASSESSMENT & PLAN NOTE
· Monitor for signs of delirium tremens  · Utilize the CIWA protocol  · Utilize folic acid 1 mg IV Qdaily and thiamine 100 mg IV Qdaily  · Daily PO multivitamin

## 2022-05-21 NOTE — ASSESSMENT & PLAN NOTE
· Likely due to liver disease  · Monitor for any signs of bleeding  · Follow the platelet count  · Outpatient Hematology/Oncology evaluation    Results from last 7 days   Lab Units 05/21/22  0622 05/20/22  0824   WBC Thousand/uL 4 63 5 22   HEMOGLOBIN g/dL 7 1* 8 5*   HEMATOCRIT % 22 8* 26 3*   PLATELETS Thousands/uL 79* 86*

## 2022-05-22 PROBLEM — E83.51 HYPOCALCEMIA: Status: ACTIVE | Noted: 2022-05-22

## 2022-05-22 LAB
ALBUMIN SERPL BCP-MCNC: 2.5 G/DL (ref 3.5–5)
ALP SERPL-CCNC: 109 U/L (ref 46–116)
ALT SERPL W P-5'-P-CCNC: 31 U/L (ref 12–78)
ANION GAP SERPL CALCULATED.3IONS-SCNC: 7 MMOL/L (ref 4–13)
AST SERPL W P-5'-P-CCNC: 48 U/L (ref 5–45)
BASOPHILS # BLD AUTO: 0 THOUSANDS/ΜL (ref 0–0.1)
BASOPHILS NFR BLD AUTO: 0 % (ref 0–1)
BILIRUB SERPL-MCNC: 0.74 MG/DL (ref 0.2–1)
BUN SERPL-MCNC: 9 MG/DL (ref 5–25)
CA-I BLD-SCNC: 1.02 MMOL/L (ref 1.12–1.32)
CALCIUM ALBUM COR SERPL-MCNC: 8.5 MG/DL (ref 8.3–10.1)
CALCIUM SERPL-MCNC: 7.3 MG/DL (ref 8.3–10.1)
CHLORIDE SERPL-SCNC: 88 MMOL/L (ref 100–108)
CO2 SERPL-SCNC: 28 MMOL/L (ref 21–32)
CREAT SERPL-MCNC: 0.72 MG/DL (ref 0.6–1.3)
CRP SERPL QL: 6.7 MG/L
D DIMER PPP FEU-MCNC: 1.34 UG/ML FEU
EOSINOPHIL # BLD AUTO: 0 THOUSAND/ΜL (ref 0–0.61)
EOSINOPHIL NFR BLD AUTO: 0 % (ref 0–6)
ERYTHROCYTE [DISTWIDTH] IN BLOOD BY AUTOMATED COUNT: 25 % (ref 11.6–15.1)
GFR SERPL CREATININE-BSD FRML MDRD: 105 ML/MIN/1.73SQ M
GLUCOSE SERPL-MCNC: 122 MG/DL (ref 65–140)
HCT VFR BLD AUTO: 24.2 % (ref 36.5–49.3)
HGB BLD-MCNC: 7.3 G/DL (ref 12–17)
HIV 1+2 AB+HIV1 P24 AG SERPL QL IA: NORMAL
IMM GRANULOCYTES # BLD AUTO: 0.03 THOUSAND/UL (ref 0–0.2)
IMM GRANULOCYTES NFR BLD AUTO: 0 % (ref 0–2)
LYMPHOCYTES # BLD AUTO: 0.67 THOUSANDS/ΜL (ref 0.6–4.47)
LYMPHOCYTES NFR BLD AUTO: 10 % (ref 14–44)
MAGNESIUM SERPL-MCNC: 1.3 MG/DL (ref 1.6–2.6)
MCH RBC QN AUTO: 21.9 PG (ref 26.8–34.3)
MCHC RBC AUTO-ENTMCNC: 30.2 G/DL (ref 31.4–37.4)
MCV RBC AUTO: 73 FL (ref 82–98)
MONOCYTES # BLD AUTO: 0.52 THOUSAND/ΜL (ref 0.17–1.22)
MONOCYTES NFR BLD AUTO: 8 % (ref 4–12)
NEUTROPHILS # BLD AUTO: 5.51 THOUSANDS/ΜL (ref 1.85–7.62)
NEUTS SEG NFR BLD AUTO: 82 % (ref 43–75)
NRBC BLD AUTO-RTO: 0 /100 WBCS
OSMOLALITY UR: 211 MMOL/KG
PHOSPHATE SERPL-MCNC: 1.6 MG/DL (ref 2.7–4.5)
PLATELET # BLD AUTO: 95 THOUSANDS/UL (ref 149–390)
PMV BLD AUTO: 10.1 FL (ref 8.9–12.7)
POTASSIUM SERPL-SCNC: 3.6 MMOL/L (ref 3.5–5.3)
PROCALCITONIN SERPL-MCNC: 0.16 NG/ML
PROT SERPL-MCNC: 6.4 G/DL (ref 6.4–8.2)
RBC # BLD AUTO: 3.34 MILLION/UL (ref 3.88–5.62)
SODIUM SERPL-SCNC: 123 MMOL/L (ref 136–145)
WBC # BLD AUTO: 6.73 THOUSAND/UL (ref 4.31–10.16)

## 2022-05-22 PROCEDURE — 82330 ASSAY OF CALCIUM: CPT | Performed by: INTERNAL MEDICINE

## 2022-05-22 PROCEDURE — 83735 ASSAY OF MAGNESIUM: CPT | Performed by: INTERNAL MEDICINE

## 2022-05-22 PROCEDURE — 85025 COMPLETE CBC W/AUTO DIFF WBC: CPT | Performed by: INTERNAL MEDICINE

## 2022-05-22 PROCEDURE — 97167 OT EVAL HIGH COMPLEX 60 MIN: CPT

## 2022-05-22 PROCEDURE — 99232 SBSQ HOSP IP/OBS MODERATE 35: CPT | Performed by: INTERNAL MEDICINE

## 2022-05-22 PROCEDURE — 86140 C-REACTIVE PROTEIN: CPT | Performed by: INTERNAL MEDICINE

## 2022-05-22 PROCEDURE — 97163 PT EVAL HIGH COMPLEX 45 MIN: CPT

## 2022-05-22 PROCEDURE — 84145 PROCALCITONIN (PCT): CPT | Performed by: INTERNAL MEDICINE

## 2022-05-22 PROCEDURE — 85379 FIBRIN DEGRADATION QUANT: CPT | Performed by: INTERNAL MEDICINE

## 2022-05-22 PROCEDURE — 80053 COMPREHEN METABOLIC PANEL: CPT | Performed by: INTERNAL MEDICINE

## 2022-05-22 PROCEDURE — 84100 ASSAY OF PHOSPHORUS: CPT | Performed by: INTERNAL MEDICINE

## 2022-05-22 RX ORDER — CALCIUM CARBONATE 500(1250)
1 TABLET ORAL
Status: DISCONTINUED | OUTPATIENT
Start: 2022-05-23 | End: 2022-05-26 | Stop reason: HOSPADM

## 2022-05-22 RX ORDER — MELATONIN
2000 DAILY
Status: DISCONTINUED | OUTPATIENT
Start: 2022-05-23 | End: 2022-05-26 | Stop reason: HOSPADM

## 2022-05-22 RX ORDER — MAGNESIUM SULFATE HEPTAHYDRATE 40 MG/ML
2 INJECTION, SOLUTION INTRAVENOUS ONCE
Status: COMPLETED | OUTPATIENT
Start: 2022-05-22 | End: 2022-05-22

## 2022-05-22 RX ADMIN — HYDROMORPHONE HYDROCHLORIDE 1 MG: 1 INJECTION, SOLUTION INTRAMUSCULAR; INTRAVENOUS; SUBCUTANEOUS at 21:10

## 2022-05-22 RX ADMIN — LEVETIRACETAM 1000 MG: 500 TABLET, FILM COATED ORAL at 21:11

## 2022-05-22 RX ADMIN — HEPARIN SODIUM 5000 UNITS: 5000 INJECTION INTRAVENOUS; SUBCUTANEOUS at 05:45

## 2022-05-22 RX ADMIN — HEPARIN SODIUM 5000 UNITS: 5000 INJECTION INTRAVENOUS; SUBCUTANEOUS at 21:10

## 2022-05-22 RX ADMIN — MULTIPLE VITAMINS W/ MINERALS TAB 1 TABLET: TAB at 08:13

## 2022-05-22 RX ADMIN — BARICITINIB 4 MG: 2 TABLET, FILM COATED ORAL at 09:57

## 2022-05-22 RX ADMIN — MAGNESIUM SULFATE HEPTAHYDRATE 2 G: 40 INJECTION, SOLUTION INTRAVENOUS at 09:57

## 2022-05-22 RX ADMIN — HEPARIN SODIUM 5000 UNITS: 5000 INJECTION INTRAVENOUS; SUBCUTANEOUS at 13:58

## 2022-05-22 RX ADMIN — PANTOPRAZOLE SODIUM 40 MG: 40 TABLET, DELAYED RELEASE ORAL at 05:45

## 2022-05-22 RX ADMIN — PANCRELIPASE 6000 UNITS: 30000; 6000; 19000 CAPSULE, DELAYED RELEASE PELLETS ORAL at 13:58

## 2022-05-22 RX ADMIN — FOLIC ACID: 5 INJECTION, SOLUTION INTRAMUSCULAR; INTRAVENOUS; SUBCUTANEOUS at 08:23

## 2022-05-22 RX ADMIN — LEVETIRACETAM 1000 MG: 500 TABLET, FILM COATED ORAL at 08:13

## 2022-05-22 RX ADMIN — VITAM B12 100 MCG: 100 TAB at 08:13

## 2022-05-22 RX ADMIN — HYDROMORPHONE HYDROCHLORIDE 1 MG: 1 INJECTION, SOLUTION INTRAMUSCULAR; INTRAVENOUS; SUBCUTANEOUS at 04:55

## 2022-05-22 RX ADMIN — PANCRELIPASE 6000 UNITS: 30000; 6000; 19000 CAPSULE, DELAYED RELEASE PELLETS ORAL at 08:23

## 2022-05-22 RX ADMIN — DEXAMETHASONE SODIUM PHOSPHATE 6 MG: 4 INJECTION, SOLUTION INTRAMUSCULAR; INTRAVENOUS at 15:14

## 2022-05-22 RX ADMIN — HYDROMORPHONE HYDROCHLORIDE 1 MG: 1 INJECTION, SOLUTION INTRAMUSCULAR; INTRAVENOUS; SUBCUTANEOUS at 15:15

## 2022-05-22 RX ADMIN — HYDROMORPHONE HYDROCHLORIDE 1 MG: 1 INJECTION, SOLUTION INTRAMUSCULAR; INTRAVENOUS; SUBCUTANEOUS at 09:07

## 2022-05-22 RX ADMIN — POTASSIUM PHOSPHATE, MONOBASIC AND POTASSIUM PHOSPHATE, DIBASIC 21 MMOL: 224; 236 INJECTION, SOLUTION, CONCENTRATE INTRAVENOUS at 13:58

## 2022-05-22 RX ADMIN — PANCRELIPASE 6000 UNITS: 30000; 6000; 19000 CAPSULE, DELAYED RELEASE PELLETS ORAL at 16:21

## 2022-05-22 RX ADMIN — CHOLECALCIFEROL TAB 25 MCG (1000 UNIT) 1000 UNITS: 25 TAB at 08:13

## 2022-05-22 RX ADMIN — REMDESIVIR 100 MG: 100 INJECTION, POWDER, LYOPHILIZED, FOR SOLUTION INTRAVENOUS at 14:55

## 2022-05-22 NOTE — ASSESSMENT & PLAN NOTE
· Received potassium chloride 40 mEq PO x 1 dose and potassium phosphate 12 mmol IV x 1 dose on 05/21/2022  · Give potassium phosphorus 21 mmol IV x 1 dose on 05/22/2022  · Follow the potassium level    Results from last 7 days   Lab Units 05/22/22 0423 05/21/22 2022 05/21/22  0622   SODIUM mmol/L 123* 122* 121*   POTASSIUM mmol/L 3 6 4 2 3 3*   CHLORIDE mmol/L 88* 87* 84*   CO2 mmol/L 28 25 28   BUN mg/dL 9 8 8   CREATININE mg/dL 0 72 0 85 0 77   CALCIUM mg/dL 7 3* 7 6* 7 4*

## 2022-05-22 NOTE — ASSESSMENT & PLAN NOTE
· Received potassium phosphate 12 mmol IV x 1 dose on 05/21/2022  · Give potassium phosphorus 21 mmol IV x 1 dose on 05/22/2022  · Follow the phosphorus level     Latest Reference Range & Units 05/22/22 04:23   Magnesium 1 6 - 2 6 mg/dL 1 3 (L)   (L): Data is abnormally low

## 2022-05-22 NOTE — ASSESSMENT & PLAN NOTE
· Check the patient's stool for occult blood x 3 specimens  · Follow the CBC  · Transfuse for a hemoglobin less than 7 g/dl    Results from last 7 days   Lab Units 05/22/22  0423 05/21/22 2022 05/21/22  0622 05/20/22  0824   WBC Thousand/uL 6 73  --  4 63 5 22   HEMOGLOBIN g/dL 7 3* 7 7* 7 1* 8 5*   HEMATOCRIT % 24 2* 25 0* 22 8* 26 3*   PLATELETS Thousands/uL 95*  --  79* 86*      Latest Reference Range & Units 05/21/22 06:22   Iron 65 - 175 ug/dL 38 (L) [1]   Ferritin 8 - 388 ng/mL 301   Iron Saturation 20 - 50 % 13 (L)   TIBC 250 - 450 ug/dL 292   Folate 3 1 - 17 5 ng/mL 10 8 [2]   (L): Data is abnormally low  [1] Patients treated with metal-binding drugs (ie  Deferoxamine) may have depressed iron values  [2] Slightly Hemolyzed;  Results May be Affected     Latest Reference Range & Units 05/21/22 06:22   Vitamin B-12 100 - 900 pg/mL 1,251 (H)   (H): Data is abnormally high

## 2022-05-22 NOTE — PLAN OF CARE
Problem: Nutrition/Hydration-ADULT  Goal: Nutrient/Hydration intake appropriate for improving, restoring or maintaining nutritional needs  Description: Monitor and assess patient's nutrition/hydration status for malnutrition  Collaborate with interdisciplinary team and initiate plan and interventions as ordered  Monitor patient's weight and dietary intake as ordered or per policy  Utilize nutrition screening tool and intervene as necessary  Determine patient's food preferences and provide high-protein, high-caloric foods as appropriate       INTERVENTIONS:  - Monitor oral intake, urinary output, labs, and treatment plans  - Assess nutrition and hydration status and recommend course of action  - Evaluate amount of meals eaten  - Assist patient with eating if necessary   - Allow adequate time for meals  - Recommend/ encourage appropriate diets, oral nutritional supplements, and vitamin/mineral supplements  - Order, calculate, and assess calorie counts as needed  - Recommend, monitor, and adjust tube feedings and TPN/PPN based on assessed needs  - Assess need for intravenous fluids  - Provide specific nutrition/hydration education as appropriate  - Include patient/family/caregiver in decisions related to nutrition  Outcome: Progressing     Problem: MOBILITY - ADULT  Goal: Maintain or return to baseline ADL function  Description: INTERVENTIONS:  -  Assess patient's ability to carry out ADLs; assess patient's baseline for ADL function and identify physical deficits which impact ability to perform ADLs (bathing, care of mouth/teeth, toileting, grooming, dressing, etc )  - Assess/evaluate cause of self-care deficits   - Assess range of motion  - Assess patient's mobility; develop plan if impaired  - Assess patient's need for assistive devices and provide as appropriate  - Encourage maximum independence but intervene and supervise when necessary  - Involve family in performance of ADLs  - Assess for home care needs following discharge   - Consider OT consult to assist with ADL evaluation and planning for discharge  - Provide patient education as appropriate  Outcome: Progressing  Goal: Maintains/Returns to pre admission functional level  Description: INTERVENTIONS:  - Perform BMAT or MOVE assessment daily    - Set and communicate daily mobility goal to care team and patient/family/caregiver  - Collaborate with rehabilitation services on mobility goals if consulted  - Perform Range of Motion 4 times a day  - Reposition patient every 2 hours    - Dangle patient 3 times a day  - Stand patient 3 times a day  - Ambulate patient 3 times a day  - Out of bed to chair 3 times a day   - Out of bed for meals 3 times a day  - Out of bed for toileting  - Record patient progress and toleration of activity level   Outcome: Progressing     Problem: Potential for Falls  Goal: Patient will remain free of falls  Description: INTERVENTIONS:  - Educate patient/family on patient safety including physical limitations  - Instruct patient to call for assistance with activity   - Consult OT/PT to assist with strengthening/mobility   - Keep Call bell within reach  - Keep bed low and locked with side rails adjusted as appropriate  - Keep care items and personal belongings within reach  - Initiate and maintain comfort rounds  - Make Fall Risk Sign visible to staff  - Offer Toileting every 2 Hours, in advance of need  - Initiate/Maintain fall alarm  - Obtain necessary fall risk management equipment: alarm, nonskid socks, yellow bracelet  - Apply yellow socks and bracelet for high fall risk patients  - Consider moving patient to room near nurses station  Outcome: Progressing     Problem: PAIN - ADULT  Goal: Verbalizes/displays adequate comfort level or baseline comfort level  Description: Interventions:  - Encourage patient to monitor pain and request assistance  - Assess pain using appropriate pain scale  - Administer analgesics based on type and severity of pain and evaluate response  - Implement non-pharmacological measures as appropriate and evaluate response  - Consider cultural and social influences on pain and pain management  - Notify physician/advanced practitioner if interventions unsuccessful or patient reports new pain  Outcome: Progressing     Problem: INFECTION - ADULT  Goal: Absence or prevention of progression during hospitalization  Description: INTERVENTIONS:  - Assess and monitor for signs and symptoms of infection  - Monitor lab/diagnostic results  - Monitor all insertion sites, i e  indwelling lines, tubes, and drains  - Monitor endotracheal if appropriate and nasal secretions for changes in amount and color  - [place] appropriate cooling/warming therapies per order  - Administer medications as ordered  - Instruct and encourage patient and family to use good hand hygiene technique  - Identify and instruct in appropriate isolation precautions for identified infection/condition  Outcome: Progressing     Problem: SAFETY ADULT  Goal: Maintain or return to baseline ADL function  Description: INTERVENTIONS:  -  Assess patient's ability to carry out ADLs; assess patient's baseline for ADL function and identify physical deficits which impact ability to perform ADLs (bathing, care of mouth/teeth, toileting, grooming, dressing, etc )  - Assess/evaluate cause of self-care deficits   - Assess range of motion  - Assess patient's mobility; develop plan if impaired  - Assess patient's need for assistive devices and provide as appropriate  - Encourage maximum independence but intervene and supervise when necessary  - Involve family in performance of ADLs  - Assess for home care needs following discharge   - Consider OT consult to assist with ADL evaluation and planning for discharge  - Provide patient education as appropriate  Outcome: Progressing  Goal: Maintains/Returns to pre admission functional level  Description: INTERVENTIONS:  - Perform BMAT or MOVE assessment daily    - Set and communicate daily mobility goal to care team and patient/family/caregiver  - Collaborate with rehabilitation services on mobility goals if consulted  - Perform Range of Motion 4 times a day  - Reposition patient every 2 hours    - Dangle patient 3 times a day  - Stand patient 3 times a day  - Ambulate patient 3 times a day  - Out of bed to chair 3 times a day   - Out of bed for meals 3 times a day  - Out of bed for toileting  - Record patient progress and toleration of activity level   Outcome: Progressing  Goal: Patient will remain free of falls  Description: INTERVENTIONS:  - Educate patient/family on patient safety including physical limitations  - Instruct patient to call for assistance with activity   - Consult OT/PT to assist with strengthening/mobility   - Keep Call bell within reach  - Keep bed low and locked with side rails adjusted as appropriate  - Keep care items and personal belongings within reach  - Initiate and maintain comfort rounds  - Make Fall Risk Sign visible to staff  - Offer Toileting every 2 Hours, in advance of need  - Initiate/Maintain fall alarm  - Obtain necessary fall risk management equipment: alarm, nonskid socks, yellow bracelet  - Apply yellow socks and bracelet for high fall risk patients  - Consider moving patient to room near nurses station  Outcome: Progressing     Problem: DISCHARGE PLANNING  Goal: Discharge to home or other facility with appropriate resources  Description: INTERVENTIONS:  - Identify barriers to discharge w/patient and caregiver  - Arrange for needed discharge resources and transportation as appropriate  - Identify discharge learning needs (meds, wound care, etc )  - Arrange for interpretive services to assist at discharge as needed  - Refer to Case Management Department for coordinating discharge planning if the patient needs post-hospital services based on physician/advanced practitioner order or complex needs related to functional status, cognitive ability, or social support system  Outcome: Progressing     Problem: Knowledge Deficit  Goal: Patient/family/caregiver demonstrates understanding of disease process, treatment plan, medications, and discharge instructions  Description: Complete learning assessment and assess knowledge base    Interventions:  - Provide teaching at level of understanding  - Provide teaching via preferred learning methods  Outcome: Progressing     Problem: RESPIRATORY - ADULT  Goal: Achieves optimal ventilation and oxygenation  Description: INTERVENTIONS:  - Assess for changes in respiratory status  - Assess for changes in mentation and behavior  - Position to facilitate oxygenation and minimize respiratory effort  - Oxygen administered by appropriate delivery if ordered  - Initiate smoking cessation education as indicated  - Encourage broncho-pulmonary hygiene including cough, deep breathe, Incentive Spirometry  - Assess the need for suctioning and aspirate as needed  - Assess and instruct to report SOB or any respiratory difficulty  - Respiratory Therapy support as indicated  Outcome: Progressing     Problem: GENITOURINARY - ADULT  Goal: Maintains or returns to baseline urinary function  Description: INTERVENTIONS:  - Assess urinary function  - Encourage oral fluids to ensure adequate hydration if ordered  - Administer IV fluids as ordered to ensure adequate hydration  - Administer ordered medications as needed  - Offer frequent toileting  - Follow urinary retention protocol if ordered  Outcome: Progressing  Goal: Absence of urinary retention  Description: INTERVENTIONS:  - Assess patients ability to void and empty bladder  - Monitor I/O  - Bladder scan as needed  - Discuss with physician/AP medications to alleviate retention as needed  - Discuss catheterization for long term situations as appropriate  Outcome: Progressing     Problem: METABOLIC, FLUID AND ELECTROLYTES - ADULT  Goal: Electrolytes maintained within normal limits  Description: INTERVENTIONS:  - Monitor labs and assess patient for signs and symptoms of electrolyte imbalances  - Administer electrolyte replacement as ordered  - Monitor response to electrolyte replacements, including repeat lab results as appropriate  - Instruct patient on fluid and nutrition as appropriate  Outcome: Progressing  Goal: Fluid balance maintained  Description: INTERVENTIONS:  - Monitor labs   - Monitor I/O and WT  - Instruct patient on fluid and nutrition as appropriate  - Assess for signs & symptoms of volume excess or deficit  Outcome: Progressing

## 2022-05-22 NOTE — ASSESSMENT & PLAN NOTE
· Continue cholecalciferol 2000 I  U  PO Qdaily     Latest Reference Range & Units 05/21/22 06:22   Vit D, 25-Hydroxy 30 0 - 100 0 ng/mL 23 9 (L)   (L): Data is abnormally low

## 2022-05-22 NOTE — PHYSICAL THERAPY NOTE
Physical Therapy Evaluation    Patient Name: Florecita Qiu    QHTOK'W Date: 5/22/2022     Problem List  Principal Problem:    Hyponatremia  Active Problems:    Tobacco abuse    Hepatic steatosis    Hypomagnesemia    Vitamin D deficiency    Bladder wall thickening    Hypophosphatemia    Hypokalemia    Microcytic anemia    Thrombocytopenia (HCC)    Abnormal EKG    Pancreatic pseudocyst    Alcohol use    Thoracic compression fracture (HCC)    Aortic ectasia (HCC)    Rib fractures    Seizure disorder (HCC)    Hypoxia    Traore's esophagus    Elevated d-dimer    COVID-19 virus infection       Past Medical History  Past Medical History:   Diagnosis Date    Alcohol abuse     Traore esophagus     Bowel obstruction (HCC)     Bowel perforation (HCC)     Cardiac disease     Continuous chronic alcoholism ([facility] [address] ) 10/5/2017    COPD (chronic obstructive pulmonary disease) (HCC)     History of shoulder surgery     Right shoulder    History of transfusion     Hx of cervical spine surgery     Hypertension     Incisional hernia 10/8/2017    [place], old     Mitral regurgitation     Psychiatric disorder     Seizures ([facility] [address] )         Past Surgical History  Past Surgical History:   Procedure Laterality Date    APPENDECTOMY      BACK SURGERY      CHOLECYSTECTOMY      ESOPHAGOGASTRODUODENOSCOPY N/A 11/28/2016    Procedure: ESOPHAGOGASTRODUODENOSCOPY (EGD); Surgeon: Elbert Gordon MD;  Location: [facility];   Service:     GALLBLADDER SURGERY      LAPAROTOMY N/A 10/25/2016    Procedure: LAPAROTOMY EXPLORATORY;  Surgeon: Kyleigh Rodriguez MD;  Location: [facility];  Service:     MOUTH SURGERY      PERCUTANEOUS PINNING FEMORAL NECK FRACTURE      SHOULDER SURGERY Right     SHOULDER SURGERY      SMALL INTESTINE SURGERY      STOMACH SURGERY      bal surgery           05/22/22 1133   PT Last Visit   PT Visit Date 05/22/22   Note Type   Note type Evaluation   Pain Assessment   Pain Assessment Tool 0-10 Pain Score 9   Pain Location/Orientation Orientation: Lower; Location: Back; Location: Neck; Location: Leg   Restrictions/Precautions   Other Precautions Airborne/isolation;Contact/isolation;Telemetry; Fall Risk;Pain   Home Living   Type of [address] Two level;Performs ADLs on one level; Able to live on main level with bedroom/bathroom;Stairs to enter with rails  (12 steps)   Bathroom Shower/Tub Walk-in shower   Bathroom Toilet Standard   Bathroom Equipment Grab bars in shower   Additional Comments Pt resides in a 2 story home with 12 steps to enter  He sleeps on pullout couch and full bath on main floor   Prior Function   Level of [place] Independent with ADLs and functional mobility; Needs assistance with IADLs   Lives With Family  (brother)   Receives Help From Family  (brother)   ADL Assistance Independent   IADLs   (Occasionally asks for assistance from his brother)   Falls in the last 6 months >10  (Reports averaging one fall per week)   Comments Pt reports being I with ADLs most of the time with occasional assistance from brother  Family does all cooking and cleaning  (-) drivign   Cognition   Overall Cognitive Status WFL   Arousal/Participation Alert   Orientation Level Oriented X4   RLE Assessment   RLE Assessment WFL   LLE Assessment   LLE Assessment WFL   Bed Mobility   Rolling R 4  Minimal assistance   Additional items Assist x 1;LE management;Verbal cues   Supine to Sit 4  Minimal assistance   Additional items Assist x 2;Verbal cues;LE management   Sit to Supine 4  Minimal assistance   Additional items Assist x 2;LE management;Verbal cues   Additional Comments Pt requires minimal asstiance of 1-2 for bed mobility due to weakness and pain   Transfers   Sit to Stand 3  Moderate assistance   Additional items Assist x 2; Increased time required;Verbal cues   Stand to Sit 3  Moderate assistance   Additional items Assist x 2;Verbal cues; Increased time required   Additional Comments Pt requires mod ax2 for transfers due to pain and weakness  Able to stand for 20 seconds beside bed   Ambulation/Elevation   Gait Assistance Not tested   Balance   Static Sitting Good   Dynamic Sitting Fair +   Static Standing Poor +   Activity Tolerance   Activity Tolerance Patient limited by fatigue;Patient limited by pain   Assessment   Prognosis Fair   Problem List Decreased strength;Decreased endurance; Impaired balance;Decreased mobility;Pain   Assessment Patient is a 55 y o  male evaluated by Physical Therapy s/p admit to [facility] on 5/20/2022 with admitting diagnosis of: Hypokalemia, Hypomagnesemia, Hyponatremia, Pain, Closed head injury, initial encounter, Syncope and collapse, Closed wedge compression fracture of T8 vertebra, initial encounter, Closed fracture of multiple ribs of right side, initial encounter and principal problem of: Hyponatremia  PT was consulted to assess patient's functional mobility and discharge needs  Ordered are PT Evaluation and treatment with activity level of: activity as tolerated  Comorbidities affecting patient's physical performance at time of assessment include: tobacco abuse, alcohol use, thoracic compression fx, rib fx, seizures  Personal factors affecting the patient at time of IE include: step(s) to enter home and history of fall(s)  Please locate objective findings from PT assessment regarding body systems outlined above  Pt seen for co-evaluation with OT due to patient's complex medical history and need for assistance of 2 to fully assess patient's functional mobility  Pt required minimal assistance for bed mobility and mod ax2 for transfers  He requires verbal cues and physical assistance to achieve upright posture; stood x20 seconds before pain and fatigue increased to where he required seated rest break  The patient's AM-PAC Basic Mobility Inpatient Short Form Raw Score is 8   A Raw score of less than or equal to 16 suggests the patient may benefit from discharge to post-acute rehabilitation services  Please also refer to the recommendation of the Physical Therapist for safe discharge planning  Pt will benefit from continued PT intervention during LOS to address current deficits, increase LOF, and facilitate safe d/c to next level of care when medically appropriate  D/c recommendation at this time is post acute rehab services  Goals   Patient Goals "get stronger and go back to OP PT"   Short Term Goal #1 Pt to participate in LE strengthening program in order to improve bed mobility and transfers when returning home   Short Term Goal #2 Pt to perform bed mobility and tranfers I  in order to maximize independence when returning home   LTG Expiration Date 06/05/22   Long Term Goal #1 Pt to ambulate with least restrictive AD at least 200' without LOB  in order to safely ambulate around home and to appointments   Long Term Goal #2 Pt to perform at least 6 steps with HR at supervision level in order to enter home   Plan   Treatment/Interventions Functional transfer training;LE strengthening/ROM; Endurance training; Therapeutic exercise; Bed mobility;Gait training   PT Frequency 3-5x/wk   Recommendation   PT Discharge Recommendation Post acute rehabilitation services   AM-PAC Basic Mobility Inpatient   Turning in Bed Without Bedrails 3   Lying on Back to Sitting on Edge of Flat Bed 1   Moving Bed to Chair 1   Standing Up From Chair 1   Walk in Room 1   Climb 3-5 Stairs 1   Basic Mobility Inpatient Raw Score 8   Highest Level Of Mobility   JH-HLM Goal 3: Sit at edge of bed   JH-HLM Achieved 3: Sit at edge of bed   End of Consult   Patient Position at End of Consult Supine; All needs within reach     Pt supine in bed with all needs in reach at end of session  PT d/c recommendation at this time is KARLI Hua, PT

## 2022-05-22 NOTE — PLAN OF CARE
Problem: OCCUPATIONAL THERAPY ADULT  Goal: Performs self-care activities at highest level of function for planned discharge setting  See evaluation for individualized goals  Description: Treatment Interventions: ADL retraining, Functional transfer training, UE strengthening/ROM, Endurance training, Patient/family training, Equipment evaluation/education, Neuromuscular reeducation, Energy conservation, Compensatory technique education          See flowsheet documentation for full assessment, interventions and recommendations  Note: Limitation: Decreased ADL status, Decreased UE ROM, Decreased UE strength, Decreased Safe judgement during ADL, Decreased endurance, Decreased self-care trans, Decreased high-level ADLs  Prognosis: Fair  Assessment: Pt is a 55 y o  male seen for OT evaluation s/p admit to [facility] on 5/20/2022 w/ Hyponatremia  Comorbidities affecting pt's functional performance at time of assessment include: tobacco abuse, hepatic steatosis, vitamin D deficiency, bladder wall thickening, thrombocytopenia, pancreatic pseudocyst, thoracic compression fracture, aortic ectasia, rib fractures seizure disorder, fonseca's esophagus, microcytic anemia  Personal factors affecting pt at time of IE include:steps to enter environment, difficulty performing ADLS, difficulty performing IADLS  and health management   Prior to admission, pt was occasionally requiring Supervision or min A for LB ADLs, but typically independent in ADLs, requiring A for IADLs, and use of SPC for functional mobility  Upon evaluation: Pt requires mod-max A to complete LB ADLs, supervision to complete UB ADLs, and mod A x 2 and RW to perform functional transfers 2* the following deficits impacting occupational performance: weakness, decreased ROM, decreased strength, decreased balance, decreased tolerance and decreased safety awareness   Pt to benefit from continued skilled OT tx while in the hospital to address deficits as defined above and maximize level of functional independence w ADL's and functional mobility  Occupational Performance areas to address include: grooming, bathing/shower, toilet hygiene, dressing, health maintenance and functional mobility  The patient's raw score on the AM-PAC Daily Activity inpatient short form is 15, standardized score is 34 69, less than 39 4  Patients at this level are likely to benefit from discharge to post-acute rehabilitation services  Please refer to the recommendation of the Occupational Therapist for safe discharge planning       OT Discharge Recommendation: Post acute rehabilitation services  OT - OK to Discharge: Yes (once medically cleared)     Manuelito Butler, OT

## 2022-05-22 NOTE — ASSESSMENT & PLAN NOTE
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 also secondary to chronic alcohol abuse  · Also with a component of hypovolemic hyponatremia with nausea and a decreased PO intake over the last few days  · Received NSS IV fluids with 20 mEq KCl at 150 ml/hr for a total of 1000 ml on 05/20/2022  · Initiated a 1500 ml/24 hour fluid restriction on 05/21/2022  · The hyponatremia is improving with the fluid restriction  · Check a urine sodium level and urine osmolality  · Check a serum osmolality  · The patient was seen in consultation by Nephrology  · Follow the sodium level      Results from last 7 days   Lab Units 05/22/22 0423 05/21/22 2022 05/21/22  0622   SODIUM mmol/L 123* 122* 121*   POTASSIUM mmol/L 3 6 4 2 3 3*   CHLORIDE mmol/L 88* 87* 84*   CO2 mmol/L 28 25 28   BUN mg/dL 9 8 8   CREATININE mg/dL 0 72 0 85 0 77   CALCIUM mg/dL 7 3* 7 6* 7 4*      Latest Reference Range & Units 05/21/22 06:22   TSH 3RD GENERATON 0 450 - 4 500 uIU/mL 1 445 [1]   [1] Adult TSH (3rd generation) reference range follows the recommended guidelines of the [employer], January, 2020

## 2022-05-22 NOTE — ASSESSMENT & PLAN NOTE
· Multiple compression fractures  · Needs an outpatient DEXA scan with his PCP  · PT/OT  · Outpatient Orthopedic Spine Surgery evaluation    CT scan of the chest/abdomen/pelvis (05/20/2022): IMPRESSION:     1  Chronic compression fractures of T2, T3, T6 and T7      2   Mild compression fractures of T8 4 and T8, age indeterminant, although developing since 10/30/2021

## 2022-05-22 NOTE — ASSESSMENT & PLAN NOTE
· Likely due to liver disease  · Monitor for any signs of bleeding  · Follow the platelet count  · Outpatient Hematology/Oncology evaluation    Results from last 7 days   Lab Units 05/22/22  0423 05/21/22 2022 05/21/22 0622 05/20/22  0824   WBC Thousand/uL 6 73  --  4 63 5 22   HEMOGLOBIN g/dL 7 3* 7 7* 7 1* 8 5*   HEMATOCRIT % 24 2* 25 0* 22 8* 26 3*   PLATELETS Thousands/uL 95*  --  79* 86*

## 2022-05-22 NOTE — QUICK NOTE
I discussed the case with Neurology  Continue the current PO keppra dosing regimen  There is no indication for any addition or adjustment to his anti-epileptic medication regimen  He will need close outpatient follow-up with Neurology

## 2022-05-22 NOTE — NURSING NOTE
Pt's NC titrated to 5 LNC to maintain 02 saturations of low 90's  Pt is independently doing incentive spirometry, and breathing exercises while sitting on the side of the bed   Deng ZARATE notified via tiger connect

## 2022-05-22 NOTE — PROGRESS NOTES
Progress Note - Nephrology   Dylan Beach 55 y o  male MRN: [id number]  Unit/Bed#: 762-22 Encounter: [id number]    A/P:  1  Hyponatremia               serum sodium has improved up to 123 millimole/L from 121 millimole/L  Continue with fluid restriction which is at 1 5 L in 24 hours  Continue to monitor clinical status  2  Probable underlying SIADH               continue with fluid restriction, cortisol level pending for tomorrow morning  3  Hypokalemia               potassium level stable, he will be receiving 12 millimoles of potassium phosphate  4  Hypomagnesemia               patient will have 2 g IV magnesium sulfate infused  5  Hypophosphatemia   As mentioned above, patient for 12 millimoles of potassium phosphate  6  COVID positive               continue treatment according to hospitalist, patient remains on nasal cannula at this time  7  Alcoholism   Continue monitor for withdrawal symptoms, defer to hospitalist for appropriate care      Follow up reason for today's visit:  Multiple electrolyte abnormalities/SIADH    Hyponatremia    Patient Active Problem List   Diagnosis    Tobacco abuse    Essential hypertension    H/O suicide attempt    H/O cervical spine surgery    Hyponatremia    Dietary folate deficiency anemia    Ventral hernia without obstruction or gangrene    Hepatomegaly    Hepatic steatosis    Hypomagnesemia    Elevated alkaline phosphatase level    Alcoholic hepatitis without ascites    Vitamin D deficiency    Iron deficiency    Urinary retention    Bladder wall thickening    Hypophosphatemia    Generalized weakness    Malnutrition of moderate degree (HCC)    Hypokalemia    Continuous chronic alcoholism (HCC)    Incisional hernia    Hx of seizure disorder    Seizure-like activity (HCC)    Diarrhea    Abnormality of pancreatic duct    Allergic rhinitis    Microcytic anemia    Abdominal wound dehiscence    Cervical disc disorder with myelopathy    Cervical spinal stenosis    Depression    Left foot drop    Lumbar radiculopathy    Neuropathy involving both lower extremities    Peripheral neuropathy    T6 vertebral fracture (HCC)    Alcoholic cirrhosis of liver without ascites (HCC)    Thrombocytopenia (HCC)    Closed fracture of left olecranon process, initial encounter    Iron deficiency anemia due to chronic blood loss    Duodenal ulcer    Tubular adenoma    Traore esophagus    Celiac artery stenosis (HCC)    Pancreatic mass    Pancytopenia (HCC)    Constipation    Transaminitis    Lesion of spleen    Left anterior fascicular block    Abnormal EKG    Acute on chronic pancreatitis (HCC)    Pancreatic pseudocyst    COPD (chronic obstructive pulmonary disease) (HCC)    Ileus ([address] )    Ambulatory dysfunction    Current every day smoker    Fall    Hx of duodenal ulcer    Neck pain    Alcohol use    Thoracic compression fracture (HCC)    Aortic ectasia (HCC)    Rib fractures    Seizure disorder (HCC)    Hypoxia    Traore's esophagus    Elevated d-dimer    COVID-19 virus infection         Subjective:   No acute events overnight, he is eating and drinking  Objective:     Vitals: Blood pressure 114/71, pulse 83, temperature 98 1 °F (36 7 °C), resp  rate 20, height 5' 6" (1 676 m), weight 55 3 kg (122 lb), SpO2 (!) 88 %  ,Body mass index is 19 69 kg/m²  Weight (last 2 days)     Date/Time Weight    05/20/22 0750 55 3 (122)            Intake/Output Summary (Last 24 hours) at 5/22/2022 1315  Last data filed at 5/22/2022 0602  Gross per 24 hour   Intake 480 ml   Output 750 ml   Net -270 ml     I/O last 3 completed shifts:   In: 480 [P O :480]  Out: 1000 [Urine:1000]         Physical Exam: /71   Pulse 83   Temp 98 1 °F (36 7 °C)   Resp 20   Ht 5' 6" (1 676 m)   Wt 55 3 kg (122 lb)   SpO2 (!) 88%   BMI 19 69 kg/m²     General Appearance:    Alert, cooperative, no distress, appears stated age   Head:    Normocephalic, without obvious abnormality, atraumatic   Eyes:    Conjunctiva/corneas clear   Ears:    Normal external ears   Nose:   Nares normal, septum midline, mucosa normal, no drainage    or sinus tenderness   Throat:   Lips, mucosa, and tongue normal; teeth and gums normal   Neck:   Supple   Back:     Symmetric, no curvature, ROM normal, no CVA tenderness   Lungs:     Clear to auscultation bilaterally, respirations unlabored   Chest wall:    No tenderness or deformity   Heart:    Regular rate and rhythm, S1 and S2 normal, no murmur, rub   or gallop   Abdomen:     Soft, non-tender, bowel sounds active   Extremities:   Extremities normal, atraumatic, no cyanosis or edema   Skin:   Skin color, texture, turgor normal, no rashes or lesions   Lymph nodes:   Cervical normal   Neurologic:   CNII-XII intact            Lab, Imaging and other studies: I have personally reviewed pertinent labs  CBC:   Lab Results   Component Value Date    WBC 6 73 05/22/2022    HGB 7 3 (L) 05/22/2022    HCT 24 2 (L) 05/22/2022    MCV 73 (L) 05/22/2022    PLT 95 (L) 05/22/2022    MCH 21 9 (L) 05/22/2022    MCHC 30 2 (L) 05/22/2022    RDW 25 0 (H) 05/22/2022    MPV 10 1 05/22/2022    NRBC 0 05/22/2022     CMP:   Lab Results   Component Value Date    K 3 6 05/22/2022    CL 88 (L) 05/22/2022    CO2 28 05/22/2022    BUN 9 05/22/2022    CREATININE 0 72 05/22/2022    CALCIUM 7 3 (L) 05/22/2022    AST 48 (H) 05/22/2022    ALT 31 05/22/2022    ALKPHOS 109 05/22/2022    EGFR 105 05/22/2022           Results from last 7 days   Lab Units 05/22/22  0423 05/21/22  2022 05/21/22  0622 05/20/22  1504 05/20/22  0824   POTASSIUM mmol/L 3 6 4 2 3 3*   < > 2 3*   CHLORIDE mmol/L 88* 87* 84*   < > 81*   CO2 mmol/L 28 25 28   < > 29   BUN mg/dL 9 8 8   < > 10   CREATININE mg/dL 0 72 0 85 0 77   < > 1 07   CALCIUM mg/dL 7 3* 7 6* 7 4*   < > 7 5*   ALK PHOS U/L 109  --  111  --  120*   ALT U/L 31  --  28  --  43   AST U/L 48*  --  73*  --  101*    < > = values in this interval not displayed  Phosphorus:   Lab Results   Component Value Date    PHOS 1 6 (L) 05/22/2022     Magnesium:   Lab Results   Component Value Date    MG 1 3 (L) 05/22/2022     Urinalysis: No results found for: Dewaine Mailman, SPECGRAV, PHUR, LEUKOCYTESUR, NITRITE, PROTEINUA, GLUCOSEU, KETONESU, BILIRUBINUR, BLOODU  Ionized Calcium: No results found for: CAION  Coagulation: No results found for: PT, INR, APTT  Troponin: No results found for: TROPONINI  ABG: No results found for: PHART, QXC7NDE, PO2ART, VDD8WDY, Q2JCMGHA, BEART, SOURCE  Radiology review:     IMAGING  Procedure: CT chest abdomen pelvis wo contrast    Result Date: 5/20/2022  Narrative: CT CHEST, ABDOMEN AND PELVIS WITHOUT IV CONTRAST INDICATION:   fall x5 2d prior with thoracic/lumbar/sacral pain and L-sided rib pain 6-8  COMPARISON:  CT of the chest, abdomen and pelvis from October 30, 2021  CT of the abdomen and pelvis from November 22, 2020  History of pancreatitis and pseudocyst formation in 2020  TECHNIQUE: CT examination of the chest, abdomen and pelvis was performed without intravenous contrast  This examination was performed without intravenous contrast in the context of the critical nationwide Omnipaque shortage  Axial, sagittal, and coronal 2D reformatted images were created from the source data and submitted for interpretation  3D reconstructions of the bony thorax were performed in order to improved sensitivity of evaluation for rib fractures  Radiation dose length product (DLP) for this visit:  798 79 mGy-cm   This examination, like all CT scans performed in the [facility], was performed utilizing techniques to minimize radiation dose exposure, including the use of iterative  reconstruction and automated exposure control  Enteric contrast was not administered  Absence of intravenous and gastrointestinal contrast limits evaluation of the abdominal and pelvic viscera   FINDINGS: CHEST LUNGS:  Subsegmental atelectasis in the lingula  Lungs otherwise clear  PLEURA:  Unremarkable  No pleural effusion or pneumothorax  HEART/GREAT VESSELS: Extensive coronary artery calcifications  Heart otherwise unremarkable  There is fusiform ectasia of the ascending thoracic aorta measuring up to 4 0 cm  Recommendation is for follow-up low radiation dose chest CT in one year  Mild  dilatation of the central pulmonary arteries with the main pulmonary artery diameter of 3 1 cm  MEDIASTINUM AND MANE: No lymphadenopathy or mass  No mediastinal hematoma or pneumomediastinum  Esophagus unremarkable  Trachea and main stem bronchi normal  CHEST WALL AND LOWER NECK:  Unremarkable  ABDOMEN LIVER/BILIARY TREE:  Limited evaluation without IV contrast   Generalized decreased attenuation of the liver, indicating fatty change  No gross evidence of hepatic mass or injury  Bile ducts normal in caliber  GALLBLADDER:  Postcholecystectomy  SPLEEN:  Limited evaluation without IV contrast   Multiple calcified granulomas  No evidence of splenic injury  PANCREAS:  Limited evaluation without IV contrast   Calcifications in the pancreatic head and proximal body, indicative of chronic pancreatitis  3 0 x 3 2 cm cyst in the pancreatic head, measuring 3 4 x 3 9 cm on the CT from 10/30/2021  ADRENAL GLANDS:  Unremarkable  KIDNEYS/URETERS:  Limited evaluation without IV contrast   Grossly unremarkable  STOMACH AND BOWEL:  Unremarkable  APPENDIX:  Post appendectomy  ABDOMINOPELVIC CAVITY: No lymphadenopathy or mass  No ascites or discrete fluid collection  No extraluminal gas  VESSELS:  Atherosclerotic changes are present  No evidence of aneurysm  PELVIS REPRODUCTIVE ORGANS: Prostate and seminal vesicles unremarkable for patient's age  URINARY BLADDER:  Bladder wall thickening  Otherwise unremarkable  ABDOMINAL WALL/INGUINAL REGIONS:  Unremarkable  OSSEOUS STRUCTURES:  Status post ORIF of a fracture of the proximal right humerus    Several healed right-sided rib fractures  Recent appearing fractures of the posterior right 9th and 10th ribs at their costovertebral junctions  Chronic compression fractures of T6 and T7  Mild compression fracture of the inferior endplate of T8, age indeterminate, although developing since 10/30/2021  No other evidence of vertebral fracture  Status post lower cervical spine fusion  Subtle deformity of the left inferior pubic ramus, consistent with healed fracture, age indeterminate although developing since 10/30/2021  Impression: 1  Limited examination due to absence of IV contrast  2   3   Mild ectasia of the ascending aorta, measuring up to 4 0 cm  4   Lingular subsegmental atelectasis  5   Hepatic steatosis  6   Persistent pseudocyst in the pancreatic head, decreased in size since a CT from 10/30/2021  7   Nonspecific mild bladder wall thickening  8   No evidence of acute injury in the chest, abdomen or pelvis  9   Chronic compression fractures of T6 and T7  Healed fracture of the left inferior pubic ramus and several healed right-sided rib fractures  10   Mild compression fracture of T8, involving the inferior endplate, age indeterminate although developing since 10/30/2021  11   Recent appearing fractures of the right 9th and 10th ribs at their costovertebral junctions  The study was marked in [facility] for immediate notification  Workstation performed: [id number]     Procedure: XR Trauma chest portable    Result Date: 5/20/2022  Narrative: CHEST INDICATION:   TRAUMA  COMPARISON:  Chest radiograph November 22, 2020 EXAM PERFORMED/VIEWS:  XR CHEST PORTABLE FINDINGS: Cardiomediastinal silhouette appears unremarkable  No focal consolidation, pleural effusion or pneumothorax  Known rib fractures are better seen on the concurrent chest CT  Partially imaged hardware in the right humeral head and cervical spine  Impression: No focal consolidation, pleural effusion, or pneumothorax   Known rib fractures are better seen on the concurrent chest CT  Workstation performed: [id number]     Procedure: TRAUMA - CT head wo contrast    Result Date: 5/20/2022  Narrative: CT BRAIN - WITHOUT CONTRAST INDICATION:   Facial trauma, blunt TRAUMA  COMPARISON:  Head CT November 12, 2009 TECHNIQUE:  CT examination of the brain was performed  In addition to axial images, sagittal and coronal 2D reformatted images were created and submitted for interpretation  Radiation dose length product (DLP) for this visit:  860 86 mGy-cm   This examination, like all CT scans performed in the [facility], was performed utilizing techniques to minimize radiation dose exposure, including the use of iterative  reconstruction and automated exposure control  IMAGE QUALITY:  Diagnostic  FINDINGS: PARENCHYMA: Decreased attenuation is noted in periventricular and subcortical white matter demonstrating an appearance that is statistically most likely to represent mild microangiopathic change  No CT signs of acute infarction  No intracranial mass, mass effect or midline shift  No acute parenchymal hemorrhage  VENTRICLES AND EXTRA-AXIAL SPACES:  Ventricles and extra-axial CSF spaces are prominent commensurate with the degree of moderate volume loss  No hydrocephalus  No acute extra-axial hemorrhage  VISUALIZED ORBITS AND PARANASAL SINUSES:  Pansinus mucosal thickening  No acute orbital findings  CALVARIUM AND EXTRACRANIAL SOFT TISSUES:  Normal      Impression: No acute intracranial abnormality  Microangiopathic changes  Moderate diffuse parenchymal volume loss  Pansinus mucosal thickening  The study was marked in [facility] for immediate notification  Workstation performed: [id number]     Procedure: TRAUMA - CT spine cervical wo contrast    Result Date: 5/20/2022  Narrative: CT CERVICAL SPINE - WITHOUT CONTRAST INDICATION:   TRAUMA   COMPARISON:  CT cervical spine October 30, 2021 TECHNIQUE:  CT examination of the cervical spine was performed without intravenous contrast   Contiguous axial images were obtained  Sagittal and coronal reconstructions were performed  Radiation dose length product (DLP) for this visit:  343 85 mGy-cm   This examination, like all CT scans performed in the [facility], was performed utilizing techniques to minimize radiation dose exposure, including the use of iterative  reconstruction and automated exposure control  IMAGE QUALITY:  Diagnostic  FINDINGS: ALIGNMENT:  Straightening of the mid and lower cervical spine related to surgical fixation  VERTEBRAL BODIES:  No fracture  DEGENERATIVE CHANGES:  Posterior decompression from C4 through C7, with paired vertical rods and screws from C4 through T1  Fracture of the right T1 screw is unchanged (series 601, image 112)  Post anterior cervical discectomy and fusion at C5 through the C7  Pannus formation is noted adjacent to the dens  Moderate degree of degenerative change  Significant left-sided facet arthrosis at C2-C3 has slightly increased since prior study  No spinal canal stenosis  PREVERTEBRAL AND PARASPINAL SOFT TISSUES:  Unremarkable  THORACIC INLET:  Please refer to the concurrent chest, abdomen, and pelvic CT report for description of the thoracic inlet findings  Impression: No cervical spine fracture or traumatic malalignment  Unchanged fracture of the right-sided screw at T1  Hardware is unchanged in appearance  Multilevel degenerative change of the spine  Workstation performed: [id number]     Procedure: CT recon only thoracolumbar (no charge)    Result Date: 5/20/2022  Narrative: CT THORACIC AND LUMBAR SPINE INDICATION:   Back trauma, no prior imaging (Age >= 16y) trauma TRAUMA  64-year-old male with multiple falls 2 days ago  COMPARISON:  CTA of the chest, abdomen and pelvis from October 30, 2021  CTA of the thoracic and lumbar spine from August 11, 2014  TECHNIQUE:  Contiguous axial images were obtained  Sagittal and coronal reconstructions were performed  Radiation dose length product (DLP) for this visit:  0 mGy-cm   This examination, like all CT scans performed in the [facility], was performed utilizing techniques to minimize radiation dose exposure, including the use of iterative reconstruction and automated exposure control  IMAGE QUALITY:  Diagnostic  FINDINGS: ALIGNMENT:  Accentuated thoracic kyphosis  Alignment otherwise normal  VERTEBRAE:  Chronic compression fractures of T6 and T7, unchanged since 10/30/2021  Compression fracture of T8, involving the inferior endplate, with approximately 30% loss of height, age indeterminate, although developing since 10/30/2021  Mild chronic superior endplate depressions at T2 and T3  Mild superior endplate compression at T4, developing since 10/30/2021  Healed fractures of the posterior portions of ribs at the costovertebral junctions, bilaterally  Some were present on 10/30/2021 and others have developed in the interim  None appear to be recent  No bone destruction or osteoblastic lesion  DISC LEVELS:  Mild degenerative disc disease at multiple thoracic and lumbar levels  JonHerman Yuriutoya FACET JOINTS:  Unremarkable  FORAMINA:   Mild to moderate bilateral foraminal stenosis from L3-L4 through L5-S1  SPINAL CANAL:  Mild spinal stenosis at L3-L4 and L4-L5  SACRUM AND SACROILIAC JOINTS:  Fracture of the left sacral ala (see series 5, images 165-175), recent in appearance, not present on 10/30/2021  PARASPINAL SOFT TISSUES:   Normal  IMAGED PORTIONS OF CHEST, ABDOMEN AND PELVIS: Unremarkable  Impression: 1  Chronic compression fractures of T2, T3, T6 and T7  2   Mild compression fractures of T8 4 and T8, age indeterminant, although developing since 10/30/2021  3   Chronic, healed fractures of the posterior portions of several ribs, bilaterally  4   Recent appearing fracture of the left sacral ala  5   Degenerative changes at several thoracic and lumbar levels   The study was marked in [facility] for immediate notification   Workstation performed: [id number]       Current Facility-Administered Medications   Medication Dose Route Frequency    albuterol inhalation solution 2 5 mg  2 5 mg Nebulization Q6H PRN    baricitinib (OLUMIANT) tablet 4 mg  4 mg Oral Q24H    [START ON 5/23/2022] cholecalciferol (VITAMIN D3) tablet 2,000 Units  2,000 Units Oral Daily    cyanocobalamin (VITAMIN B-12) tablet 100 mcg  100 mcg Oral Daily    dexamethasone (DECADRON) injection 6 mg  6 mg Intravenous Q24H    docusate sodium (COLACE) capsule 100 mg  100 mg Oral BID PRN    folic acid 1 mg, thiamine (VITAMIN B1) 100 mg in sodium chloride 0 9 % 100 mL IV piggyback   Intravenous Daily    heparin (porcine) subcutaneous injection 5,000 Units  5,000 Units Subcutaneous Q8H [address]    HYDROmorphone (DILAUDID) injection 1 mg  1 mg Intravenous Q4H PRN    Or    oxyCODONE (ROXICODONE) immediate release tablet 10 mg  10 mg Oral Q4H PRN    levETIRAcetam (KEPPRA) tablet 1,000 mg  1,000 mg Oral Q12H [address]    multivitamin-minerals (CENTRUM) tablet 1 tablet  1 tablet Oral Daily    ondansetron (ZOFRAN) injection 4 mg  4 mg Intravenous Q6H PRN    pancrelipase (Lip-Prot-Amyl) (CREON) delayed release capsule 6,000 Units  6,000 Units Oral TID With Meals    pantoprazole (PROTONIX) EC tablet 40 mg  40 mg Oral Early Morning    potassium phosphates 21 mmol in sodium chloride 0 9 % 250 mL infusion  21 mmol Intravenous Once    remdesivir (Veklury) 100 mg in sodium chloride 0 9 % 270 mL IVPB  100 mg Intravenous Q24H     Medications Discontinued During This Encounter   Medication Reason    sodium chloride 0 9 % infusion     oxyCODONE (ROXICODONE) IR tablet 5 mg     HYDROmorphone (DILAUDID) injection 0 5 mg     cholecalciferol (VITAMIN D3) tablet 1,000 Units     nicotine (NICODERM CQ) 14 mg/24hr TD 24 hr patch 1 patch        Samantha Homans,       This progress note was produced in part using a dictation device which may document imprecise wording from author's original intent

## 2022-05-22 NOTE — ASSESSMENT & PLAN NOTE
· Received a total of 4 grams of IV magnesium sulfate on 05/20/2022 and magnesium sulfate 2 grams IV x 1 dose on 05/21/2022  · Give magnesium sulfate 2 grams IV x 1 dose on 05/22/2022  · Follow the magnesium level    Results from last 7 days   Lab Units 05/22/22  0423 05/21/22 2022 05/21/22  0622   MAGNESIUM mg/dL 1 3* 1 8 1 5*

## 2022-05-22 NOTE — OCCUPATIONAL THERAPY NOTE
Occupational Therapy Evaluation     Patient Name: Basim BERNAL Date: 5/22/2022  Problem List  Principal Problem:    Hyponatremia  Active Problems:    Tobacco abuse    Hepatic steatosis    Hypomagnesemia    Vitamin D deficiency    Bladder wall thickening    Hypophosphatemia    Hypokalemia    Microcytic anemia    Thrombocytopenia (HCC)    Abnormal EKG    Pancreatic pseudocyst    Alcohol use    Thoracic compression fracture (HCC)    Aortic ectasia (HCC)    Rib fractures    Seizure disorder (HCC)    Hypoxia    Traore's esophagus    Elevated d-dimer    COVID-19 virus infection    Past Medical History  Past Medical History:   Diagnosis Date    Alcohol abuse     Traore esophagus     Bowel obstruction (HCC)     Bowel perforation (HCC)     Cardiac disease     Continuous chronic alcoholism ([facility] [address] ) 10/5/2017    COPD (chronic obstructive pulmonary disease) (HCC)     History of shoulder surgery     Right shoulder    History of transfusion     Hx of cervical spine surgery     Hypertension     Incisional hernia 10/8/2017    MI, old     Mitral regurgitation     Psychiatric disorder     Seizures ([facility] [address] )      Past Surgical History  Past Surgical History:   Procedure Laterality Date    APPENDECTOMY      BACK SURGERY      CHOLECYSTECTOMY      ESOPHAGOGASTRODUODENOSCOPY N/A 11/28/2016    Procedure: ESOPHAGOGASTRODUODENOSCOPY (EGD); Surgeon: Tc Isaacs MD;  Location:  GI LAB;   Service:     GALLBLADDER SURGERY      LAPAROTOMY N/A 10/25/2016    Procedure: Dorcas Grooms;  Surgeon: Dianne Dahl MD;  Location: [facility];  Service:     MOUTH SURGERY      PERCUTANEOUS PINNING FEMORAL NECK FRACTURE      SHOULDER SURGERY Right     SHOULDER SURGERY      SMALL INTESTINE SURGERY      STOMACH SURGERY      bal surgery         05/22/22 1158   OT Last Visit   OT Visit Date 05/22/22   Note Type   Note type Evaluation   Restrictions/Precautions   Other Precautions Airborne/isolation;Contact/isolation;Multiple lines; O2;Fall Risk;Pain;Telemetry; Seizure   Pain Assessment   Pain Assessment Tool 0-10   Pain Score 9   Pain Location/Orientation Location: Back;Orientation: Lower; Location: Buttocks; Location: Rib Cage   Home Living   Type of Marti to enter with rails; Two level  (12 TIFF c HR, sleeps on sofa)   Bathroom Shower/Tub Walk-in shower   Bathroom Toilet Standard   Bathroom Equipment Shower chair   Home Equipment Cane   Additional Comments Pt reports use of SPC for functional mobility at baseline  Prior Function   Level of [place] Independent with ADLs and functional mobility; Needs assistance with IADLs  (prn A for ADLs)   Lives With   (brother)   Receives Help From Family   ADL Assistance Independent  (typically Independent, when not feeling while requires supervision or min A to complete LB ADLs, shower)   IADLs Needs assistance  (Brother does all cooking, cleaning and laundry)   Falls in the last 6 months >10  (~1 fall a week, 6 falls in 30 min prior to admission)   Vocational On disability   Comments (-) driving   Lifestyle   Autonomy Pt reporting typically independent in ADLs, occasionally requires supervision or min A for LB ADLs when not feeling well, A for all IADLs, and use of SPC for functional mobility at baseline  Pt lives in [address] [place] [address] with first floor set-up with 12 TIFF   Reciprocal Relationships brother   Intrinsic Gratification Pt enjoys watching tv  Psychosocial   Psychosocial (WDL) WDL   Subjective   Subjective "I haven't been able to move"   ADL   Eating Assistance 7  Independent   Grooming Assistance 5  Supervision/Setup   UB Bathing Assistance 5  Supervision/Setup   LB Bathing Assistance 2  Maximal Assistance   UB Dressing Assistance 5  Supervision/Setup   LB Dressing Assistance 2  Maximal [address]  2  Maximal Assistance   Bed Mobility   Rolling L 4  Minimal assistance   Additional items Assist x 1; Increased time required;Verbal cues; Bedrails Supine to Sit 4  Minimal assistance   Additional items Assist x 2; Increased time required;Verbal cues;LE management; Bedrails   Sit to Supine 4  Minimal assistance   Additional items Assist x 2; Increased time required;Verbal cues;LE management; Bedrails   Additional Comments Pt supine in bed at start of session  Pt on RA at 92%  Pt with nasal O2 off  Once seated EOB, pt 's SPO2 decreased to 85%, after 2-3 min rest break increased back to 90%  Encourgaed pt to wear nasal O2 but declined, RN aware  Pt returned to supine in bed at end of session, v c  for log rolling technique  Pt required A x 2 to boost in bed  Transfers   Sit to Stand 3  Moderate assistance   Additional items Assist x 2; Increased time required;Verbal cues; Bedrails   Stand to Sit 3  Moderate assistance   Additional items Assist x 2; Increased time required;Verbal cues;Armrests   Additional Comments Pt supported on RW to perform functional transfers  Pt mod A x 2 and RW to perform STS  Pt able to stand for ~20 seconds before requesting to sit back down due to fatigue and weakness  V C  for technique and safety awareness required  SPO2 decreased to 85% while performing functional transfers, increased back to 90% once seated  Functional Mobility   Additional Comments Unable to perform functional mobility today due to poor standing tolerance  Balance   Static Sitting Good   Dynamic Sitting Fair +   Static Standing Poor +   Activity Tolerance   Activity Tolerance Patient limited by pain; Patient limited by fatigue   Medical Staff Made Aware MARIANNE Christine verbalized pt appropriate for OT evaluation  PT Tuyet present for co-evaluation due to pt's medical complexity requiring 2 skilled therapists to safely perform functional movements to complete evaluation     RUE Assessment   RUE Assessment WFL  (strength 4-/5, ROM WFL for ADLs)   LUE Assessment   LUE Assessment WFL  (strength 4-/5, ROM WFL for ADLs)   Hand Function   Gross Motor Coordination Functional Fine Motor Coordination Functional   Cognition   Overall Cognitive Status WFL   Arousal/Participation Alert; Responsive; Cooperative   Attention Within functional limits   Orientation Level Oriented X4   Memory Within functional limits   Following Commands Follows all commands and directions without difficulty   Comments Pt pleasant and agreeable to OT evaluation  Assessment   Limitation Decreased ADL status; Decreased UE ROM; Decreased UE strength;Decreased Safe judgement during ADL;Decreased endurance;Decreased self-care trans;Decreased high-level ADLs   Prognosis Fair   Assessment Pt is a 55 y o  male seen for OT evaluation s/p admit to [facility] on 5/20/2022 w/ Hyponatremia  Comorbidities affecting pt's functional performance at time of assessment include: tobacco abuse, hepatic steatosis, vitamin D deficiency, bladder wall thickening, thrombocytopenia, pancreatic pseudocyst, thoracic compression fracture, aortic ectasia, rib fractures seizure disorder, fonseca's esophagus, microcytic anemia  Personal factors affecting pt at time of IE include:steps to enter environment, difficulty performing ADLS, difficulty performing IADLS  and health management   Prior to admission, pt was occasionally requiring Supervision or min A for LB ADLs, but typically independent in ADLs, requiring A for IADLs, and use of SPC for functional mobility  Upon evaluation: Pt requires mod-max A to complete LB ADLs, supervision to complete UB ADLs, and mod A x 2 and RW to perform functional transfers 2* the following deficits impacting occupational performance: weakness, decreased ROM, decreased strength, decreased balance, decreased tolerance and decreased safety awareness  Pt to benefit from continued skilled OT tx while in the hospital to address deficits as defined above and maximize level of functional independence w ADL's and functional mobility   Occupational Performance areas to address include: grooming, bathing/shower, toilet hygiene, dressing, health maintenance and functional mobility  The patient's raw score on the AM-PAC Daily Activity inpatient short form is 15, standardized score is 34 69, less than 39 4  Patients at this level are likely to benefit from discharge to post-acute rehabilitation services  Please refer to the recommendation of the Occupational Therapist for safe discharge planning  Goals   Patient Goals to decrease pain   Plan   Treatment Interventions ADL retraining;Functional transfer training;UE strengthening/ROM; Endurance training;Patient/family training;Equipment evaluation/education; Neuromuscular reeducation; Energy conservation; Compensatory technique education   Goal Expiration Date 06/05/22   OT Frequency 3-5x/wk   Recommendation   OT Discharge Recommendation Post acute rehabilitation services   OT - OK to Discharge Yes  (once medically cleared)   Additional Comments  Pt left supine in bed, all needs met, call bell in reach  Educated pt on the importance of using call bell for A if he needs to perform functional transfers, verbalized understanding  AM-PAC Daily Activity Inpatient   Lower Body Dressing 2   Bathing 2   Toileting 2   Upper Body Dressing 3   Grooming 3   Eating 3   Daily Activity Raw Score 15   Daily Activity Standardized Score (Calc for Raw Score >=11) 34 69   AM-PAC Applied Cognition Inpatient   Following a Speech/Presentation 4   Understanding Ordinary Conversation 4   Taking Medications 4   Remembering Where Things Are Placed or Put Away 4   Remembering List of 4-5 Errands 4   Taking Care of Complicated Tasks 3   Applied Cognition Raw Score 23   Applied Cognition Standardized Score 53 08     Goals    Pt will perform bed mobility with supervision to increase participation in functional tasks  Pt will perform UB ADLs with (I) to maximize participation in ADLs  Pt will perform LB ADLs with min A to maximize participation in ADLs      Pt will perform toileting/toilet hygiene min A and use of BSC to increase independence in self care  Pt will perform ADL transfers with min A and use of RW to increase independence in ADLs  Pt will increase static and dynamic standing balance to fair- to increase safety awareness and participation in standing ADLs  Pt will increase standing tolerance to 5 minutes without LOB to increase participation in standing ADLs/purposeful tasks  Pt will complete B UE strengthening exercises to increase safety and participation in bed mobility, ADLs, and functional mobility       Brendon Marie, OT

## 2022-05-22 NOTE — PROGRESS NOTES
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 also secondary to chronic alcohol abuse  · Also with a component of hypovolemic hyponatremia with nausea and a decreased PO intake over the last few days  · Received NSS IV fluids with 20 mEq KCl at 150 ml/hr for a total of 1000 ml on 05/20/2022  · Initiated a 1500 ml/24 hour fluid restriction on 05/21/2022  · The hyponatremia is improving with the fluid restriction  · Check a urine sodium level and urine osmolality  · Check a serum osmolality  · The patient was seen in consultation by Nephrology  · Follow the sodium level      Results from last 7 days   Lab Units 05/22/22  0423 05/21/22 2022 05/21/22  0622   SODIUM mmol/L 123* 122* 121*   POTASSIUM mmol/L 3 6 4 2 3 3*   CHLORIDE mmol/L 88* 87* 84*   CO2 mmol/L 28 25 28   BUN mg/dL 9 8 8   CREATININE mg/dL 0 72 0 85 0 77   CALCIUM mg/dL 7 3* 7 6* 7 4*      Latest Reference Range & Units 05/21/22 06:22   TSH 3RD GENERATON 0 450 - 4 500 uIU/mL 1 445 [1]   [1] Adult TSH (3rd generation) reference range follows the recommended guidelines of the [employer], January, 2020      COVID-19 virus infection  Assessment & Plan  · Initially requiered 3 lpm of continuous supplemental oxygen to maintain oxygen saturations of 90% and above  · Overnight from 05/21/2022-05/22/2022 developed worsening hypoxia and required up to 5 lpm of continuous supplemental oxygen to maintain oxygen saturation levels of 90% and above  · Change to the "Moderate" COVID-19 treatment algorithm on 05/22/2022  · Received remdesivir 200 mg IV x 1 dose on 05/20/2022 and continue remdesivir 100 mg IV every 24 hours (day #3 of the treatment course)  · Treat with dexamethasone 6 mg IV every 24 hours (day #3)  · Initiated baricitinib 4 mg PO Qdaily (day #1) on 05/22/2022  · The procalcitonin level was within normal limits x 2 sets    · Respiratory protocol  · Incentive spirometry  · Unable to utilize an IV heparin drip based on the elevated D-dimer level and COVID-19 treatment protocol due to the patient having thrombocytopenia and worsening anemia  · Utilize heparin 5000 units SQ every 8 hours for now  · Consult Pulmonology  · PT/OT    Hypoxia  Assessment & Plan  · Due the COVID-19 virus infection and rib fractures  · Currently requiring 2-5 lpm of continuous supplemental oxygen to maintain oxygen saturation levels at 90% and above  · Rib fracture protocol  · Incentive spirometry  · Please see the assessment and plan for "COVID-19 virus infection"    Seizure disorder [facility])  Assessment & Plan  · Had 5-6 episodes at home over the last few weeks, which he feels may represent seizure activity  · Continue PO keppra  · Consult Neurology  · Monitor for any seizure activity    Hypomagnesemia  Assessment & Plan  · Received a total of 4 grams of IV magnesium sulfate on 05/20/2022 and magnesium sulfate 2 grams IV x 1 dose on 05/21/2022  · Give magnesium sulfate 2 grams IV x 1 dose on 05/22/2022  · Follow the magnesium level    Results from last 7 days   Lab Units 05/22/22  0423 05/21/22 2022 05/21/22  0622   MAGNESIUM mg/dL 1 3* 1 8 1 5*         Alcohol use  Assessment & Plan  · Monitor for signs of delirium tremens  · Utilize the CIWA protocol  · Utilize folic acid 1 mg IV Qdaily and thiamine 100 mg IV Qdaily  · Daily PO multivitamin    Thrombocytopenia (HCC)  Assessment & Plan  · Likely due to liver disease  · Monitor for any signs of bleeding  · Follow the platelet count  · Outpatient Hematology/Oncology evaluation    Results from last 7 days   Lab Units 05/22/22  0423 05/21/22 2022 05/21/22  0622 05/20/22  0824   WBC Thousand/uL 6 73  --  4 63 5 22   HEMOGLOBIN g/dL 7 3* 7 7* 7 1* 8 5*   HEMATOCRIT % 24 2* 25 0* 22 8* 26 3*   PLATELETS Thousands/uL 95*  --  79* 86*         Hypocalcemia  Assessment & Plan  · Initiate PO calcium supplementation treatment  · Follow the ionized calcium level    Elevated d-dimer  Assessment & Plan  · In the setting of the COVID-19 virus infection  · Check a venous duplex of the bilateral lower extremities  · Check a V/Q lung scan in place of a CTA of the chest with the IV contrast dye shortage  · Cannot be fully anticoagulated this time with the worsening anemia as well as with thrombocytopenia     Latest Reference Range & Units 05/21/22 06:22   D-Dimer, Quant <0 50 ug/ml FEU 1 37 (H) [1]   (H): Data is abnormally high  [1] Reference and upper limits to exclude DVT and PE are the same  Do not use to exclude if clinical symptoms are present  Traore's esophagus  Assessment & Plan  · Continue PPI therapy  · Outpatient surveillance EGD's with Gastroenterology    Rib fractures  Assessment & Plan  · Right-sided rib fractures of the 9th rib and 10th rib  · Rib fracture protocol  · Pain control    CT scan of the chest/abdomen/pelvis (05/20/2022): Several healed right-sided rib fractures  Recent appearing fractures of the posterior right 9th and 10th ribs at their costovertebral junctions  Aortic ectasia [facility])  Assessment & Plan  · Outpatient Thoracic Surgery evaluation    CT scan of the chest/abdomen/pelvis (05/20/2022): Mild ectasia of the ascending aorta, measuring up to 4 0 cm      Thoracic compression fracture [facility])  Assessment & Plan  · Multiple compression fractures  · Needs an outpatient DEXA scan with his PCP  · PT/OT  · Outpatient Orthopedic Spine Surgery evaluation    CT scan of the chest/abdomen/pelvis (05/20/2022): IMPRESSION:     1  Chronic compression fractures of T2, T3, T6 and T7      2   Mild compression fractures of T8 4 and T8, age indeterminant, although developing since 10/30/2021  Pancreatic pseudocyst  Assessment & Plan  · Outpatient surveillance imaging with Gastroenterology  · Check a CA 19-9 level    CT scan of the chest/abdomen/pelvis (05/20/2022):  PANCREAS:  Limited evaluation without IV contrast   Calcifications in the pancreatic head and proximal body, indicative of chronic pancreatitis  3 0 x 3 2 cm cyst in the pancreatic head, measuring 3 4 x 3 9 cm on the CT from 10/30/2021  Abnormal EKG  Assessment & Plan  · Outpatient Cardiology evaluation    Microcytic anemia  Assessment & Plan  · Check the patient's stool for occult blood x 3 specimens  · Follow the CBC  · Transfuse for a hemoglobin less than 7 g/dl    Results from last 7 days   Lab Units 05/22/22 0423 05/21/22 2022 05/21/22  0622 05/20/22  0824   WBC Thousand/uL 6 73  --  4 63 5 22   HEMOGLOBIN g/dL 7 3* 7 7* 7 1* 8 5*   HEMATOCRIT % 24 2* 25 0* 22 8* 26 3*   PLATELETS Thousands/uL 95*  --  79* 86*      Latest Reference Range & Units 05/21/22 06:22   Iron 65 - 175 ug/dL 38 (L) [1]   Ferritin 8 - 388 ng/mL 301   Iron Saturation 20 - 50 % 13 (L)   TIBC 250 - 450 ug/dL 292   Folate 3 1 - 17 5 ng/mL 10 8 [2]   (L): Data is abnormally low  [1] Patients treated with metal-binding drugs (ie  Deferoxamine) may have depressed iron values  [2] Slightly Hemolyzed;  Results May be Affected     Latest Reference Range & Units 05/21/22 06:22   Vitamin B-12 100 - 900 pg/mL 1,251 (H)   (H): Data is abnormally high    Hypokalemia  Assessment & Plan  · Received potassium chloride 40 mEq PO x 1 dose and potassium phosphate 12 mmol IV x 1 dose on 05/21/2022  · Give potassium phosphorus 21 mmol IV x 1 dose on 05/22/2022  · Follow the potassium level    Results from last 7 days   Lab Units 05/22/22  0423 05/21/22  2022 05/21/22  0622   SODIUM mmol/L 123* 122* 121*   POTASSIUM mmol/L 3 6 4 2 3 3*   CHLORIDE mmol/L 88* 87* 84*   CO2 mmol/L 28 25 28   BUN mg/dL 9 8 8   CREATININE mg/dL 0 72 0 85 0 77   CALCIUM mg/dL 7 3* 7 6* 7 4*         Hypophosphatemia  Assessment & Plan  · Received potassium phosphate 12 mmol IV x 1 dose on 05/21/2022  · Give potassium phosphorus 21 mmol IV x 1 dose on 05/22/2022  · Follow the phosphorus level     Latest Reference Range & Units 05/22/22 04:23   Magnesium 1 6 - 2 6 mg/dL 1 3 (L)   (L): Data is abnormally low    Bladder wall thickening  Assessment & Plan  · The urinalysis was unremarkable  · Outpatient Urology evaluation with his tobacco abuse history    Vitamin D deficiency  Assessment & Plan  · Continue cholecalciferol 2000 I  U  PO Qdaily     Latest Reference Range & Units 05/21/22 06:22   Vit D, 25-Hydroxy 30 0 - 100 0 ng/mL 23 9 (L)   (L): Data is abnormally low    Hepatic steatosis  Assessment & Plan  · Due to alcohol abuse  · Avoid all hepatotoxic agents  · Outpatient surveillance imaging with Gastroenterology    Tobacco abuse  Assessment & Plan  · The patient declined a nicotine patch  · Smoking cessation counseling        VTE Pharmacologic Prophylaxis: VTE Score: 9 High Risk (Score >/= 5) - Pharmacological DVT Prophylaxis Ordered: heparin  Sequential Compression Devices Ordered  Patient Centered Rounds: I performed bedside rounds with nursing staff today  I discussed the case with Nephrology  Education and Discussions with Family / Patient: Patient declined call to   Time Spent for Care: 30 minutes  More than 50% of total time spent on counseling and coordination of care as described above      Current Length of Stay: 2 day(s)  Current Patient Status: Inpatient   Certification Statement: The patient will continue to require additional inpatient hospital stay due to the need for IV dexamethasone treatment, for IV remdesivir treatment, with the patient having a continuous supplemental oxygen requirement to maintain adequate oxygen saturation levels, and for serial laboratory testing to monitor his sodium level  Discharge Plan: Anticipate discharge in 48-72 hrs to home with home services  Code Status: Level 1 - Full Code    Subjective: The patient was seen and examined  The patient complains of left hip pain and lower back pain  No chest pain  No shortness of breath  No abdominal pain  No nausea or vomiting  Objective:     Vitals:   Temp (24hrs), Av 8 °F (36 6 °C), Min:97 4 °F (36 3 °C), Max:98 1 °F (36 7 °C)    Temp:  [97 4 °F (36 3 °C)-98 1 °F (36 7 °C)] 98 1 °F (36 7 °C)  HR:  [65-84] 83  Resp:  [18-22] 20  BP: (114-120)/(70-72) 114/71  SpO2:  [84 %-96 %] 88 %  Body mass index is 19 69 kg/m²  Input and Output Summary (last 24 hours):      Intake/Output Summary (Last 24 hours) at 2022 1321  Last data filed at 2022 0602  Gross per 24 hour   Intake 480 ml   Output 750 ml   Net -270 ml       Physical Exam:   Physical Exam   General:  NAD, follows commands  HEENT:  NC/AT, mucous membranes moist  Neck:  Supple, No JVP elevation  CV:  + S1, + S2, RRR  Pulm:  Scattered rhonchi bilaterally  Abd:  Soft, Non-tender, Non-distended  Ext:  No clubbing/cyanosis/edema  Skin:  Multiple superficial excoriations of the bilateral upper extremities  Neuro:  Awake, alert, oriented  Psych:  Normal mood and affect      Additional Data:    Labs:  Results from last 7 days   Lab Units 22  0423   WBC Thousand/uL 6 73   HEMOGLOBIN g/dL 7 3*   HEMATOCRIT % 24 2*   PLATELETS Thousands/uL 95*   NEUTROS PCT % 82*   LYMPHS PCT % 10*   MONOS PCT % 8   EOS PCT % 0     Results from last 7 days   Lab Units 22  0423   SODIUM mmol/L 123*   POTASSIUM mmol/L 3 6   CHLORIDE mmol/L 88*   CO2 mmol/L 28   BUN mg/dL 9   CREATININE mg/dL 0 72   ANION GAP mmol/L 7   CALCIUM mg/dL 7 3*   ALBUMIN g/dL 2 5*   TOTAL BILIRUBIN mg/dL 0 74   ALK PHOS U/L 109   ALT U/L 31   AST U/L 48*   GLUCOSE RANDOM mg/dL 122     Results from last 7 days   Lab Units 05/21/22  0622   INR  1 14             Results from last 7 days   Lab Units 05/22/22  0423 05/21/22  0622   LACTIC ACID mmol/L  --  1 0   PROCALCITONIN ng/ml 0 16 0 15       Lines/Drains:  Invasive Devices  Report    Peripheral Intravenous Line  Duration           Peripheral IV 05/20/22 Right Antecubital 2 days    Peripheral IV 05/20/22 Left;Ventral (anterior) Forearm 1 day                  Telemetry:  Telemetry Orders (From admission, onward)             48 Hour Telemetry Monitoring  Continuous x 48 hours        References:    Telemetry Guidelines   Question:  Reason for 48 Hour Telemetry  Answer:  Arrhythmias Requiring Medical Therapy (eg  SVT, Vtach/fib, Bradycardia, Uncontrolled A-fib)                 Telemetry Reviewed: Normal Sinus Rhythm  Indication for Continued Telemetry Use: Arrthymias requiring medical therapy             Imaging: No pertinent imaging reviewed      Recent Cultures (last 7 days):   Results from last 7 days   Lab Units 05/20/22  1549   URINE CULTURE  <10,000 cfu/ml        Last 24 Hours Medication List:   Current Facility-Administered Medications   Medication Dose Route Frequency Provider Last Rate    albuterol  2 5 mg Nebulization Q6H PRN Greg Salinas DO      baricitinib  4 mg Oral Q24H Greg Salinas DO      [START ON 5/23/2022] calcium carbonate  1 tablet Oral Daily With Breakfast Greg Salinas DO      [START ON 5/23/2022] cholecalciferol  2,000 Units Oral Daily Greg Salinas DO      cyanocobalamin  100 mcg Oral Daily Greg Luther [place]      dexamethasone  6 mg Intravenous Q24H Greg Salinas DO      docusate sodium  100 mg Oral BID PRN Greg Salinas DO      folic acid 1 mg, thiamine 100 mg in 0 9% sodium chloride 100 mL IVPB   Intravenous Daily Greg Salinas DO      heparin (porcine)  5,000 Units Subcutaneous [facility] Greg Salinas DO      HYDROmorphone  1 mg Intravenous Q4H PRN Lawrence Salinas DO      Or   [place] oxyCODONE  10 mg Oral Q4H PRN Lawrence Salinas DO      levETIRAcetam  1,000 mg Oral Q12H [address] Lawrence Tellez Uvalda,       multivitamin-minerals  1 tablet Oral Daily Lawrence Tellez Uvalda,       ondansetron  4 mg Intravenous Q6H PRN Lawrence Salinas DO      pancrelipase (Lip-Prot-Amyl)  6,000 Units Oral TID With Meals Lawrence Salinas DO      pantoprazole  40 mg Oral Early Morning Lawrence Salinas DO      potassium phosphate  21 mmol Intravenous Once [employer], DO      remdesivir  100 mg Intravenous Q24H [employer], DO          Today, Patient Was Seen By: [employer], DO    **Please Note: This note may have been constructed using a voice recognition system  **

## 2022-05-22 NOTE — NURSING NOTE
Patient requiring 4LNC at this time to maintain 02 saturations in the low 90s when previously on room air  Breathing excercises such as coughing, changing positions/side lying, and incentive spirometer were performed  Lajoyce Boxer PA notified at this time via tiger connect  Will continue to monitor closely

## 2022-05-22 NOTE — PLAN OF CARE
Problem: PHYSICAL THERAPY ADULT  Goal: Performs mobility at highest level of function for planned discharge setting  See evaluation for individualized goals  Description: Treatment/Interventions: Functional transfer training, LE strengthening/ROM, Endurance training, Therapeutic exercise, Bed mobility, Gait training          See flowsheet documentation for full assessment, interventions and recommendations  Note: Prognosis: Fair  Problem List: Decreased strength, Decreased endurance, Impaired balance, Decreased mobility, Pain  Assessment: Patient is a 55 y o  male evaluated by Physical Therapy s/p admit to [facility] on 5/20/2022 with admitting diagnosis of: Hypokalemia, Hypomagnesemia, Hyponatremia, Pain, Closed head injury, initial encounter, Syncope and collapse, Closed wedge compression fracture of T8 vertebra, initial encounter, Closed fracture of multiple ribs of right side, initial encounter and principal problem of: Hyponatremia  PT was consulted to assess patient's functional mobility and discharge needs  Ordered are PT Evaluation and treatment with activity level of: activity as tolerated  Comorbidities affecting patient's physical performance at time of assessment include: tobacco abuse, alcohol use, thoracic compression fx, rib fx, seizures  Personal factors affecting the patient at time of IE include: step(s) to enter home and history of fall(s)  Please locate objective findings from PT assessment regarding body systems outlined above  Pt seen for co-evaluation with OT due to patient's complex medical history and need for assistance of 2 to fully assess patient's functional mobility  Pt required minimal assistance for bed mobility and mod ax2 for transfers  He requires verbal cues and physical assistance to achieve upright posture; stood x20 seconds before pain and fatigue increased to where he required seated rest break   The patient's AM-PAC Basic Mobility Inpatient Short Form Raw Score is 8  A Raw score of less than or equal to 16 suggests the patient may benefit from discharge to post-acute rehabilitation services  Please also refer to the recommendation of the Physical Therapist for safe discharge planning  Pt will benefit from continued PT intervention during LOS to address current deficits, increase LOF, and facilitate safe d/c to next level of care when medically appropriate  D/c recommendation at this time is post acute rehab services  PT Discharge Recommendation: Post acute rehabilitation services          See flowsheet documentation for full assessment

## 2022-05-22 NOTE — ASSESSMENT & PLAN NOTE
· Due the COVID-19 virus infection and rib fractures  · Currently requiring 2-5 lpm of continuous supplemental oxygen to maintain oxygen saturation levels at 90% and above  · Rib fracture protocol  · Incentive spirometry  · Please see the assessment and plan for "COVID-19 virus infection"

## 2022-05-22 NOTE — ASSESSMENT & PLAN NOTE
· Initially requiered 3 lpm of continuous supplemental oxygen to maintain oxygen saturations of 90% and above  · Overnight from 05/21/2022-05/22/2022 developed worsening hypoxia and required up to 5 lpm of continuous supplemental oxygen to maintain oxygen saturation levels of 90% and above  · Change to the "Moderate" COVID-19 treatment algorithm on 05/22/2022  · Received remdesivir 200 mg IV x 1 dose on 05/20/2022 and continue remdesivir 100 mg IV every 24 hours (day #3 of the treatment course)  · Treat with dexamethasone 6 mg IV every 24 hours (day #3)  · Initiated baricitinib 4 mg PO Qdaily (day #1) on 05/22/2022  · The procalcitonin level was within normal limits x 2 sets    · Respiratory protocol  · Incentive spirometry  · Unable to utilize an IV heparin drip based on the elevated D-dimer level and COVID-19 treatment protocol due to the patient having thrombocytopenia and worsening anemia  · Utilize heparin 5000 units SQ every 8 hours for now  · Consult Pulmonology  · PT/OT

## 2022-05-23 ENCOUNTER — APPOINTMENT (INPATIENT)
Dept: NON INVASIVE DIAGNOSTICS | Facility: HOSPITAL | Age: 57
DRG: 137 | End: 2022-05-23
Payer: COMMERCIAL

## 2022-05-23 ENCOUNTER — APPOINTMENT (INPATIENT)
Dept: CT IMAGING | Facility: HOSPITAL | Age: 57
DRG: 137 | End: 2022-05-23
Payer: COMMERCIAL

## 2022-05-23 ENCOUNTER — APPOINTMENT (INPATIENT)
Dept: RADIOLOGY | Facility: HOSPITAL | Age: 57
DRG: 137 | End: 2022-05-23
Payer: COMMERCIAL

## 2022-05-23 ENCOUNTER — APPOINTMENT (INPATIENT)
Dept: NUCLEAR MEDICINE | Facility: HOSPITAL | Age: 57
DRG: 137 | End: 2022-05-23
Payer: COMMERCIAL

## 2022-05-23 LAB
ALBUMIN SERPL BCP-MCNC: 2.5 G/DL (ref 3.5–5)
ALP SERPL-CCNC: 126 U/L (ref 46–116)
ALT SERPL W P-5'-P-CCNC: 32 U/L (ref 12–78)
ANION GAP SERPL CALCULATED.3IONS-SCNC: 9 MMOL/L (ref 4–13)
AORTIC ROOT: 3 CM
AORTIC VALVE MEAN VELOCITY: 14.3 M/S
APICAL FOUR CHAMBER EJECTION FRACTION: 47 %
AST SERPL W P-5'-P-CCNC: 58 U/L (ref 5–45)
AV AREA BY CONTINUOUS VTI: 1.5 CM2
AV AREA PEAK VELOCITY: 1.4 CM2
AV LVOT MEAN GRADIENT: 1 MMHG
AV LVOT PEAK GRADIENT: 2 MMHG
AV MEAN GRADIENT: 9 MMHG
AV PEAK GRADIENT: 15 MMHG
AV VALVE AREA: 1.46 CM2
AV VELOCITY RATIO: 0.39
BASOPHILS # BLD AUTO: 0 THOUSANDS/ΜL (ref 0–0.1)
BASOPHILS NFR BLD AUTO: 0 % (ref 0–1)
BILIRUB SERPL-MCNC: 1 MG/DL (ref 0.2–1)
BUN SERPL-MCNC: 8 MG/DL (ref 5–25)
CA-I BLD-SCNC: 1 MMOL/L (ref 1.12–1.32)
CALCIUM ALBUM COR SERPL-MCNC: 8.5 MG/DL (ref 8.3–10.1)
CALCIUM SERPL-MCNC: 7.3 MG/DL (ref 8.3–10.1)
CHLORIDE SERPL-SCNC: 88 MMOL/L (ref 100–108)
CO2 SERPL-SCNC: 28 MMOL/L (ref 21–32)
CORTIS SERPL-MCNC: 2.2 UG/DL
CREAT SERPL-MCNC: 0.69 MG/DL (ref 0.6–1.3)
CRP SERPL QL: 6.1 MG/L
D DIMER PPP FEU-MCNC: 1.73 UG/ML FEU
DOP CALC AO PEAK VEL: 1.93 M/S
DOP CALC AO VTI: 42.54 CM
DOP CALC LVOT AREA: 3.46 CM2
DOP CALC LVOT DIAMETER: 2.1 CM
DOP CALC LVOT PEAK VEL VTI: 17.93 CM
DOP CALC LVOT PEAK VEL: 0.75 M/S
DOP CALC LVOT STROKE INDEX: 37.7 ML/M2
DOP CALC LVOT STROKE VOLUME: 62.07 CM3
E WAVE DECELERATION TIME: 198 MS
EOSINOPHIL # BLD AUTO: 0 THOUSAND/ΜL (ref 0–0.61)
EOSINOPHIL NFR BLD AUTO: 0 % (ref 0–6)
ERYTHROCYTE [DISTWIDTH] IN BLOOD BY AUTOMATED COUNT: 25.5 % (ref 11.6–15.1)
FRACTIONAL SHORTENING: 26 % (ref 28–44)
GFR SERPL CREATININE-BSD FRML MDRD: 106 ML/MIN/1.73SQ M
GLUCOSE SERPL-MCNC: 135 MG/DL (ref 65–140)
HCT VFR BLD AUTO: 23.1 % (ref 36.5–49.3)
HGB BLD-MCNC: 7.2 G/DL (ref 12–17)
IMM GRANULOCYTES # BLD AUTO: 0.04 THOUSAND/UL (ref 0–0.2)
IMM GRANULOCYTES NFR BLD AUTO: 1 % (ref 0–2)
INTERVENTRICULAR SEPTUM IN DIASTOLE (PARASTERNAL SHORT AXIS VIEW): 1.1 CM
INTERVENTRICULAR SEPTUM: 1.1 CM (ref 0.6–1.1)
LEFT ATRIUM AREA SYSTOLE SINGLE PLANE A4C: 13.4 CM2
LEFT ATRIUM SIZE: 3.6 CM
LEFT INTERNAL DIMENSION IN SYSTOLE: 2.9 CM (ref 2.1–4)
LEFT VENTRICULAR INTERNAL DIMENSION IN DIASTOLE: 3.9 CM (ref 3.5–6)
LEFT VENTRICULAR POSTERIOR WALL IN END DIASTOLE: 1.1 CM
LEFT VENTRICULAR STROKE VOLUME: 34 ML
LVSV (TEICH): 34 ML
LYMPHOCYTES # BLD AUTO: 0.79 THOUSANDS/ΜL (ref 0.6–4.47)
LYMPHOCYTES NFR BLD AUTO: 9 % (ref 14–44)
MAGNESIUM SERPL-MCNC: 1 MG/DL (ref 1.6–2.6)
MCH RBC QN AUTO: 22.2 PG (ref 26.8–34.3)
MCHC RBC AUTO-ENTMCNC: 31.2 G/DL (ref 31.4–37.4)
MCV RBC AUTO: 71 FL (ref 82–98)
MONOCYTES # BLD AUTO: 0.5 THOUSAND/ΜL (ref 0.17–1.22)
MONOCYTES NFR BLD AUTO: 6 % (ref 4–12)
MV E'TISSUE VEL-SEP: 7 CM/S
MV PEAK A VEL: 0.83 M/S
MV PEAK E VEL: 71 CM/S
MV STENOSIS PRESSURE HALF TIME: 57 MS
MV VALVE AREA P 1/2 METHOD: 3.86 CM2
NEUTROPHILS # BLD AUTO: 7.13 THOUSANDS/ΜL (ref 1.85–7.62)
NEUTS SEG NFR BLD AUTO: 84 % (ref 43–75)
NRBC BLD AUTO-RTO: 0 /100 WBCS
PHOSPHATE SERPL-MCNC: 2.6 MG/DL (ref 2.7–4.5)
PLATELET # BLD AUTO: 119 THOUSANDS/UL (ref 149–390)
PMV BLD AUTO: 9.9 FL (ref 8.9–12.7)
POTASSIUM SERPL-SCNC: 3.5 MMOL/L (ref 3.5–5.3)
PROCALCITONIN SERPL-MCNC: 0.09 NG/ML
PROT SERPL-MCNC: 6.2 G/DL (ref 6.4–8.2)
RBC # BLD AUTO: 3.25 MILLION/UL (ref 3.88–5.62)
RIGHT ATRIUM AREA SYSTOLE A4C: 11.4 CM2
RIGHT VENTRICLE ID DIMENSION: 2.5 CM
SL CV LV EF: 50
SL CV PED ECHO LEFT VENTRICLE DIASTOLIC VOLUME (MOD BIPLANE) 2D: 68 ML
SL CV PED ECHO LEFT VENTRICLE SYSTOLIC VOLUME (MOD BIPLANE) 2D: 34 ML
SODIUM SERPL-SCNC: 125 MMOL/L (ref 136–145)
TR MAX PG: 16 MMHG
TR PEAK VELOCITY: 2 M/S
TRICUSPID VALVE PEAK REGURGITATION VELOCITY: 2.02 M/S
WBC # BLD AUTO: 8.46 THOUSAND/UL (ref 4.31–10.16)

## 2022-05-23 PROCEDURE — 93306 TTE W/DOPPLER COMPLETE: CPT

## 2022-05-23 PROCEDURE — 84145 PROCALCITONIN (PCT): CPT | Performed by: INTERNAL MEDICINE

## 2022-05-23 PROCEDURE — 71275 CT ANGIOGRAPHY CHEST: CPT

## 2022-05-23 PROCEDURE — 99233 SBSQ HOSP IP/OBS HIGH 50: CPT | Performed by: FAMILY MEDICINE

## 2022-05-23 PROCEDURE — 99232 SBSQ HOSP IP/OBS MODERATE 35: CPT | Performed by: PHYSICIAN ASSISTANT

## 2022-05-23 PROCEDURE — 82533 TOTAL CORTISOL: CPT | Performed by: INTERNAL MEDICINE

## 2022-05-23 PROCEDURE — 84300 ASSAY OF URINE SODIUM: CPT | Performed by: PHYSICIAN ASSISTANT

## 2022-05-23 PROCEDURE — 78580 LUNG PERFUSION IMAGING: CPT

## 2022-05-23 PROCEDURE — 85379 FIBRIN DEGRADATION QUANT: CPT | Performed by: INTERNAL MEDICINE

## 2022-05-23 PROCEDURE — 99255 IP/OBS CONSLTJ NEW/EST HI 80: CPT | Performed by: INTERNAL MEDICINE

## 2022-05-23 PROCEDURE — 93306 TTE W/DOPPLER COMPLETE: CPT | Performed by: INTERNAL MEDICINE

## 2022-05-23 PROCEDURE — 83935 ASSAY OF URINE OSMOLALITY: CPT | Performed by: PHYSICIAN ASSISTANT

## 2022-05-23 PROCEDURE — 80053 COMPREHEN METABOLIC PANEL: CPT | Performed by: INTERNAL MEDICINE

## 2022-05-23 PROCEDURE — 86301 IMMUNOASSAY TUMOR CA 19-9: CPT | Performed by: INTERNAL MEDICINE

## 2022-05-23 PROCEDURE — 86140 C-REACTIVE PROTEIN: CPT | Performed by: INTERNAL MEDICINE

## 2022-05-23 PROCEDURE — 83735 ASSAY OF MAGNESIUM: CPT | Performed by: INTERNAL MEDICINE

## 2022-05-23 PROCEDURE — A9540 TC99M MAA: HCPCS

## 2022-05-23 PROCEDURE — 94760 N-INVAS EAR/PLS OXIMETRY 1: CPT

## 2022-05-23 PROCEDURE — 94640 AIRWAY INHALATION TREATMENT: CPT

## 2022-05-23 PROCEDURE — 94664 DEMO&/EVAL PT USE INHALER: CPT

## 2022-05-23 PROCEDURE — 93970 EXTREMITY STUDY: CPT

## 2022-05-23 PROCEDURE — 84100 ASSAY OF PHOSPHORUS: CPT | Performed by: INTERNAL MEDICINE

## 2022-05-23 PROCEDURE — G1004 CDSM NDSC: HCPCS

## 2022-05-23 PROCEDURE — 82330 ASSAY OF CALCIUM: CPT | Performed by: INTERNAL MEDICINE

## 2022-05-23 PROCEDURE — 71046 X-RAY EXAM CHEST 2 VIEWS: CPT

## 2022-05-23 PROCEDURE — 85025 COMPLETE CBC W/AUTO DIFF WBC: CPT | Performed by: INTERNAL MEDICINE

## 2022-05-23 PROCEDURE — 93970 EXTREMITY STUDY: CPT | Performed by: SURGERY

## 2022-05-23 RX ORDER — METHYLPREDNISOLONE SODIUM SUCCINATE 40 MG/ML
40 INJECTION, POWDER, LYOPHILIZED, FOR SOLUTION INTRAMUSCULAR; INTRAVENOUS EVERY 12 HOURS SCHEDULED
Status: DISCONTINUED | OUTPATIENT
Start: 2022-05-23 | End: 2022-05-24

## 2022-05-23 RX ORDER — MAGNESIUM SULFATE HEPTAHYDRATE 40 MG/ML
2 INJECTION, SOLUTION INTRAVENOUS ONCE
Status: COMPLETED | OUTPATIENT
Start: 2022-05-23 | End: 2022-05-23

## 2022-05-23 RX ORDER — POLYETHYLENE GLYCOL 3350 17 G/17G
17 POWDER, FOR SOLUTION ORAL DAILY PRN
Status: DISCONTINUED | OUTPATIENT
Start: 2022-05-23 | End: 2022-05-26 | Stop reason: HOSPADM

## 2022-05-23 RX ORDER — ENOXAPARIN SODIUM 100 MG/ML
1 INJECTION SUBCUTANEOUS EVERY 12 HOURS SCHEDULED
Status: DISCONTINUED | OUTPATIENT
Start: 2022-05-23 | End: 2022-05-24

## 2022-05-23 RX ORDER — LEVALBUTEROL 1.25 MG/.5ML
1.25 SOLUTION, CONCENTRATE RESPIRATORY (INHALATION)
Status: DISCONTINUED | OUTPATIENT
Start: 2022-05-23 | End: 2022-05-24

## 2022-05-23 RX ADMIN — PANCRELIPASE 6000 UNITS: 30000; 6000; 19000 CAPSULE, DELAYED RELEASE PELLETS ORAL at 12:26

## 2022-05-23 RX ADMIN — LEVETIRACETAM 1000 MG: 500 TABLET, FILM COATED ORAL at 09:32

## 2022-05-23 RX ADMIN — LEVETIRACETAM 1000 MG: 500 TABLET, FILM COATED ORAL at 20:50

## 2022-05-23 RX ADMIN — METHYLPREDNISOLONE SODIUM SUCCINATE 40 MG: 40 INJECTION, POWDER, FOR SOLUTION INTRAMUSCULAR; INTRAVENOUS at 20:50

## 2022-05-23 RX ADMIN — ENOXAPARIN SODIUM 60 MG: 60 INJECTION SUBCUTANEOUS at 20:50

## 2022-05-23 RX ADMIN — IOHEXOL 65 ML: 350 INJECTION, SOLUTION INTRAVENOUS at 17:55

## 2022-05-23 RX ADMIN — IPRATROPIUM BROMIDE 0.5 MG: 0.5 SOLUTION RESPIRATORY (INHALATION) at 15:57

## 2022-05-23 RX ADMIN — ENOXAPARIN SODIUM 60 MG: 60 INJECTION SUBCUTANEOUS at 12:25

## 2022-05-23 RX ADMIN — CHOLECALCIFEROL TAB 25 MCG (1000 UNIT) 2000 UNITS: 25 TAB at 09:32

## 2022-05-23 RX ADMIN — MAGNESIUM SULFATE HEPTAHYDRATE 2 G: 40 INJECTION, SOLUTION INTRAVENOUS at 14:54

## 2022-05-23 RX ADMIN — VITAM B12 100 MCG: 100 TAB at 09:32

## 2022-05-23 RX ADMIN — CALCIUM 1 TABLET: 500 TABLET ORAL at 09:32

## 2022-05-23 RX ADMIN — HYDROMORPHONE HYDROCHLORIDE 1 MG: 1 INJECTION, SOLUTION INTRAMUSCULAR; INTRAVENOUS; SUBCUTANEOUS at 22:13

## 2022-05-23 RX ADMIN — FOLIC ACID: 5 INJECTION, SOLUTION INTRAMUSCULAR; INTRAVENOUS; SUBCUTANEOUS at 09:33

## 2022-05-23 RX ADMIN — HYDROMORPHONE HYDROCHLORIDE 1 MG: 1 INJECTION, SOLUTION INTRAMUSCULAR; INTRAVENOUS; SUBCUTANEOUS at 18:05

## 2022-05-23 RX ADMIN — MAGNESIUM SULFATE HEPTAHYDRATE 2 G: 40 INJECTION, SOLUTION INTRAVENOUS at 18:03

## 2022-05-23 RX ADMIN — HEPARIN SODIUM 5000 UNITS: 5000 INJECTION INTRAVENOUS; SUBCUTANEOUS at 05:09

## 2022-05-23 RX ADMIN — HYDROMORPHONE HYDROCHLORIDE 1 MG: 1 INJECTION, SOLUTION INTRAMUSCULAR; INTRAVENOUS; SUBCUTANEOUS at 05:09

## 2022-05-23 RX ADMIN — METHYLPREDNISOLONE SODIUM SUCCINATE 40 MG: 40 INJECTION, POWDER, FOR SOLUTION INTRAMUSCULAR; INTRAVENOUS at 12:25

## 2022-05-23 RX ADMIN — PANCRELIPASE 6000 UNITS: 30000; 6000; 19000 CAPSULE, DELAYED RELEASE PELLETS ORAL at 17:59

## 2022-05-23 RX ADMIN — REMDESIVIR 100 MG: 100 INJECTION, POWDER, LYOPHILIZED, FOR SOLUTION INTRAVENOUS at 12:25

## 2022-05-23 RX ADMIN — PANTOPRAZOLE SODIUM 40 MG: 40 TABLET, DELAYED RELEASE ORAL at 05:09

## 2022-05-23 RX ADMIN — POTASSIUM PHOSPHATE, MONOBASIC AND POTASSIUM PHOSPHATE, DIBASIC 12 MMOL: 224; 236 INJECTION, SOLUTION, CONCENTRATE INTRAVENOUS at 17:59

## 2022-05-23 RX ADMIN — MULTIPLE VITAMINS W/ MINERALS TAB 1 TABLET: TAB at 09:32

## 2022-05-23 RX ADMIN — HYDROMORPHONE HYDROCHLORIDE 1 MG: 1 INJECTION, SOLUTION INTRAMUSCULAR; INTRAVENOUS; SUBCUTANEOUS at 09:32

## 2022-05-23 RX ADMIN — LEVALBUTEROL 1.25 MG: 1.25 SOLUTION, CONCENTRATE RESPIRATORY (INHALATION) at 15:57

## 2022-05-23 RX ADMIN — BARICITINIB 4 MG: 2 TABLET, FILM COATED ORAL at 09:34

## 2022-05-23 RX ADMIN — PANCRELIPASE 6000 UNITS: 30000; 6000; 19000 CAPSULE, DELAYED RELEASE PELLETS ORAL at 09:34

## 2022-05-23 NOTE — ASSESSMENT & PLAN NOTE
· In the setting of the COVID-19 virus infection  · Check a venous duplex of the bilateral lower extremities  · Check a V/Q lung scan in place of a CTA of the chest with the IV contrast dye shortage  · Initiated on weight based Lovenox in setting of COVID infection     Latest Reference Range & Units 05/21/22 06:22   D-Dimer, Quant <0 50 ug/ml FEU 1 37 (H) [1]   (H): Data is abnormally high  [1] Reference and upper limits to exclude DVT and PE are the same  Do not use to exclude if clinical symptoms are present

## 2022-05-23 NOTE — ASSESSMENT & PLAN NOTE
· Due to alcohol abuse  · Avoid all hepatotoxic agents  · Outpatient surveillance imaging with Gastroenterology  · Alcohol cessation strongly recommended

## 2022-05-23 NOTE — PLAN OF CARE
Problem: Nutrition/Hydration-ADULT  Goal: Nutrient/Hydration intake appropriate for improving, restoring or maintaining nutritional needs  Description: Monitor and assess patient's nutrition/hydration status for malnutrition  Collaborate with interdisciplinary team and initiate plan and interventions as ordered  Monitor patient's weight and dietary intake as ordered or per policy  Utilize nutrition screening tool and intervene as necessary  Determine patient's food preferences and provide high-protein, high-caloric foods as appropriate       INTERVENTIONS:  - Monitor oral intake, urinary output, labs, and treatment plans  - Assess nutrition and hydration status and recommend course of action  - Evaluate amount of meals eaten  - Assist patient with eating if necessary   - Allow adequate time for meals  - Recommend/ encourage appropriate diets, oral nutritional supplements, and vitamin/mineral supplements  - Order, calculate, and assess calorie counts as needed  - Recommend, monitor, and adjust tube feedings and TPN/PPN based on assessed needs  - Assess need for intravenous fluids  - Provide specific nutrition/hydration education as appropriate  - Include patient/family/caregiver in decisions related to nutrition  Outcome: Progressing     Problem: MOBILITY - ADULT  Goal: Maintain or return to baseline ADL function  Description: INTERVENTIONS:  -  Assess patient's ability to carry out ADLs; assess patient's baseline for ADL function and identify physical deficits which impact ability to perform ADLs (bathing, care of mouth/teeth, toileting, grooming, dressing, etc )  - Assess/evaluate cause of self-care deficits   - Assess range of motion  - Assess patient's mobility; develop plan if impaired  - Assess patient's need for assistive devices and provide as appropriate  - Encourage maximum independence but intervene and supervise when necessary  - Involve family in performance of ADLs  - Assess for home care needs following discharge   - Consider OT consult to assist with ADL evaluation and planning for discharge  - Provide patient education as appropriate  Outcome: Progressing  Goal: Maintains/Returns to pre admission functional level  Description: INTERVENTIONS:  - Perform BMAT or MOVE assessment daily    - Set and communicate daily mobility goal to care team and patient/family/caregiver  - Collaborate with rehabilitation services on mobility goals if consulted  - Perform Range of Motion 4 times a day  - Reposition patient every 2 hours    - Dangle patient 3 times a day  - Stand patient 3 times a day  - Ambulate patient 3 times a day  - Out of bed to chair 3 times a day   - Out of bed for meals 3 times a day  - Out of bed for toileting  - Record patient progress and toleration of activity level   Outcome: Progressing     Problem: Potential for Falls  Goal: Patient will remain free of falls  Description: INTERVENTIONS:  - Educate patient/family on patient safety including physical limitations  - Instruct patient to call for assistance with activity   - Consult OT/PT to assist with strengthening/mobility   - Keep Call bell within reach  - Keep bed low and locked with side rails adjusted as appropriate  - Keep care items and personal belongings within reach  - Initiate and maintain comfort rounds  - Make Fall Risk Sign visible to staff  - Offer Toileting every 2 Hours, in advance of need  - Initiate/Maintain fall alarm  - Obtain necessary fall risk management equipment: alarm, nonskid socks, yellow bracelet  - Apply yellow socks and bracelet for high fall risk patients  - Consider moving patient to room near nurses station  Outcome: Progressing     Problem: PAIN - ADULT  Goal: Verbalizes/displays adequate comfort level or baseline comfort level  Description: Interventions:  - Encourage patient to monitor pain and request assistance  - Assess pain using appropriate pain scale  - Administer analgesics based on type and severity of pain and evaluate response  - Implement non-pharmacological measures as appropriate and evaluate response  - Consider cultural and social influences on pain and pain management  - Notify physician/advanced practitioner if interventions unsuccessful or patient reports new pain  Outcome: Progressing     Problem: INFECTION - ADULT  Goal: Absence or prevention of progression during hospitalization  Description: INTERVENTIONS:  - Assess and monitor for signs and symptoms of infection  - Monitor lab/diagnostic results  - Monitor all insertion sites, i e  indwelling lines, tubes, and drains  - Monitor endotracheal if appropriate and nasal secretions for changes in amount and color  - [place] appropriate cooling/warming therapies per order  - Administer medications as ordered  - Instruct and encourage patient and family to use good hand hygiene technique  - Identify and instruct in appropriate isolation precautions for identified infection/condition  Outcome: Progressing     Problem: SAFETY ADULT  Goal: Maintain or return to baseline ADL function  Description: INTERVENTIONS:  -  Assess patient's ability to carry out ADLs; assess patient's baseline for ADL function and identify physical deficits which impact ability to perform ADLs (bathing, care of mouth/teeth, toileting, grooming, dressing, etc )  - Assess/evaluate cause of self-care deficits   - Assess range of motion  - Assess patient's mobility; develop plan if impaired  - Assess patient's need for assistive devices and provide as appropriate  - Encourage maximum independence but intervene and supervise when necessary  - Involve family in performance of ADLs  - Assess for home care needs following discharge   - Consider OT consult to assist with ADL evaluation and planning for discharge  - Provide patient education as appropriate  Outcome: Progressing  Goal: Maintains/Returns to pre admission functional level  Description: INTERVENTIONS:  - Perform BMAT or MOVE assessment daily    - Set and communicate daily mobility goal to care team and patient/family/caregiver  - Collaborate with rehabilitation services on mobility goals if consulted  - Perform Range of Motion 4 times a day  - Reposition patient every 2 hours    - Dangle patient 3 times a day  - Stand patient 3 times a day  - Ambulate patient 3 times a day  - Out of bed to chair 3 times a day   - Out of bed for meals 3 times a day  - Out of bed for toileting  - Record patient progress and toleration of activity level   Outcome: Progressing  Goal: Patient will remain free of falls  Description: INTERVENTIONS:  - Educate patient/family on patient safety including physical limitations  - Instruct patient to call for assistance with activity   - Consult OT/PT to assist with strengthening/mobility   - Keep Call bell within reach  - Keep bed low and locked with side rails adjusted as appropriate  - Keep care items and personal belongings within reach  - Initiate and maintain comfort rounds  - Make Fall Risk Sign visible to staff  - Offer Toileting every 2 Hours, in advance of need  - Initiate/Maintain fall alarm  - Obtain necessary fall risk management equipment: alarm, nonskid socks, yellow bracelet  - Apply yellow socks and bracelet for high fall risk patients  - Consider moving patient to room near nurses station  Outcome: Progressing     Problem: DISCHARGE PLANNING  Goal: Discharge to home or other facility with appropriate resources  Description: INTERVENTIONS:  - Identify barriers to discharge w/patient and caregiver  - Arrange for needed discharge resources and transportation as appropriate  - Identify discharge learning needs (meds, wound care, etc )  - Arrange for interpretive services to assist at discharge as needed  - Refer to Case Management Department for coordinating discharge planning if the patient needs post-hospital services based on physician/advanced practitioner order or complex needs related to functional status, cognitive ability, or social support system  Outcome: Progressing     Problem: Knowledge Deficit  Goal: Patient/family/caregiver demonstrates understanding of disease process, treatment plan, medications, and discharge instructions  Description: Complete learning assessment and assess knowledge base    Interventions:  - Provide teaching at level of understanding  - Provide teaching via preferred learning methods  Outcome: Progressing     Problem: RESPIRATORY - ADULT  Goal: Achieves optimal ventilation and oxygenation  Description: INTERVENTIONS:  - Assess for changes in respiratory status  - Assess for changes in mentation and behavior  - Position to facilitate oxygenation and minimize respiratory effort  - Oxygen administered by appropriate delivery if ordered  - Initiate smoking cessation education as indicated  - Encourage broncho-pulmonary hygiene including cough, deep breathe, Incentive Spirometry  - Assess the need for suctioning and aspirate as needed  - Assess and instruct to report SOB or any respiratory difficulty  - Respiratory Therapy support as indicated  Outcome: Progressing     Problem: GENITOURINARY - ADULT  Goal: Maintains or returns to baseline urinary function  Description: INTERVENTIONS:  - Assess urinary function  - Encourage oral fluids to ensure adequate hydration if ordered  - Administer IV fluids as ordered to ensure adequate hydration  - Administer ordered medications as needed  - Offer frequent toileting  - Follow urinary retention protocol if ordered  Outcome: Progressing  Goal: Absence of urinary retention  Description: INTERVENTIONS:  - Assess patients ability to void and empty bladder  - Monitor I/O  - Bladder scan as needed  - Discuss with physician/AP medications to alleviate retention as needed  - Discuss catheterization for long term situations as appropriate  Outcome: Progressing     Problem: METABOLIC, FLUID AND ELECTROLYTES - ADULT  Goal: Electrolytes maintained within normal limits  Description: INTERVENTIONS:  - Monitor labs and assess patient for signs and symptoms of electrolyte imbalances  - Administer electrolyte replacement as ordered  - Monitor response to electrolyte replacements, including repeat lab results as appropriate  - Instruct patient on fluid and nutrition as appropriate  Outcome: Progressing  Goal: Fluid balance maintained  Description: INTERVENTIONS:  - Monitor labs   - Monitor I/O and WT  - Instruct patient on fluid and nutrition as appropriate  - Assess for signs & symptoms of volume excess or deficit  Outcome: Progressing     Problem: MUSCULOSKELETAL - ADULT  Goal: Maintain or return mobility to safest level of function  Description: INTERVENTIONS:  - Assess patient's ability to carry out ADLs; assess patient's baseline for ADL function and identify physical deficits which impact ability to perform ADLs (bathing, care of mouth/teeth, toileting, grooming, dressing, etc )  - Assess/evaluate cause of self-care deficits   - Assess range of motion  - Assess patient's mobility  - Assess patient's need for assistive devices and provide as appropriate  - Encourage maximum independence but intervene and supervise when necessary  - Involve family in performance of ADLs  - Assess for home care needs following discharge   - Consider OT consult to assist with ADL evaluation and planning for discharge  - Provide patient education as appropriate  Outcome: Progressing  Goal: Maintain proper alignment of affected body part  Description: INTERVENTIONS:  - Support, maintain and protect limb and body alignment  - Provide patient/ family with appropriate education  Outcome: Progressing

## 2022-05-23 NOTE — UTILIZATION REVIEW
Inpatient Admission Authorization Request   NOTIFICATION OF INPATIENT ADMISSION/INPATIENT AUTHORIZATION REQUEST   SERVICING FACILITY:   [address] [place] [address]  [address]  Tax ID:  [id number]  NPI: [id number]  Place of Service: [place] [address]  Place of Service Code: 24     ATTENDING PROVIDER:  Attending Name and NPI#: Shavon Perez Md [[id number]]  Address: [address]  Phone: [phone]     UTILIZATION REVIEW CONTACT:  Flakita Elias, Utilization   Network Utilization Review Department  Phone: [phone]  Fax [phone]  Email: [email or web address]     PHYSICIAN ADVISORY SERVICES:  FOR [id number]-[id number] REVIEW - MEDICAL NECESSITY DENIAL  Phone: [phone]  Fax: [phone]  Email: [email or web address]     TYPE OF REQUEST:  Inpatient Status     ADMISSION INFORMATION:  ADMISSION DATE/TIME: 5/20/22  9:58 AM  PATIENT DIAGNOSIS CODE/DESCRIPTION:  Hypokalemia [E87 6]  Hypomagnesemia [E83 42]  Hyponatremia [E87 1]  Pain [R52]  Closed head injury, initial encounter [S09 90XA]  Syncope and collapse [R55]  Closed wedge compression fracture of T8 vertebra, initial encounter ([facility] [address] ) [S22 060A]  Closed fracture of multiple ribs of right side, initial encounter [S22 41XA]  DISCHARGE DATE/TIME: No discharge date for patient encounter  IMPORTANT INFORMATION:  Please contact the Flakita Notice "Abel Taylor directly with any questions or concerns regarding this request  Department voicemails are confidential     Send requests for admission clinical reviews, concurrent reviews, approvals, and administrative denials due to lack of clinical to fax [phone]

## 2022-05-23 NOTE — CONSULTS
Consultation - Pulmonary Medicine   Na Egan 55 y o  male MRN: [id number]  Unit/Bed#: 736-00 Encounter: [id number]      Assessment/Plan:    1  Acute hypoxic respiratory failure  1  With currently saturations of 85-89% on room air  Refused supplemental O2 in my presence  2  Keep oxygen saturations above 88%  3  Pulmonary toilet:  Increase activity as tolerated, out of bed as tolerated, cough and deep breathing as encouraged, incentive spirometry q 1 hour  4  Would benefit from home O2 eval prior to discharge if he is agreeable   2  Moderate COVID infection  1  Complete 5 days of remdesivir (day#4/5)  2  Increase systemic steroids to Solumedrol 40 mg IV q12hrs  Discontinue decadron  3  No need for baricitinib or tocilizumab currently   4  Trend d-dimer/CRP every 2-3 days   3  Elevated D-dimer  1  Unable to perform CTA chest   2  Lower extremity dopplers negative   3  Awaiting V/Q scan   4  Recommend change AC to weight-based Lovenox 1 mg/kg q12hrs   4  Suspected COPD of unknown severity with acute exacerbation  1  Start Solumedrol 40 mg IV q12hrs   2  Add Xopenex/Atrovent TID   3  Add Mucienex 600 mg po q12hrs   5  Nicotine dependence   1  Smoking cessation encouraged       History of Present Illness   Physician Requesting Consult: Karyle Mater, MD  Reason for Consult / Principal Problem: COVID, hypoxia  Hx and PE limited by: n/a  Chief Complaint: falls  HPI: Na Egan is a 55 y o   male who presented to [address] [place] [address] with complaints of frequent falls at home  Patient's past medical history positive for nicotine dependence COPD of unknown severity, hypertension, alcohol abuse  On the day of admission patient reported increased lower extremity weakness and frequent falls mostly to his backside  Other pertinent symptoms included dizziness and lightheadedness  He had unsteady gait for a long time for which he usually uses a cane    Denied shortness breath, cough, sputum production, hemoptysis or wheeze at his baseline  No chest pain or palpitations  Came to the emergency department past/20/22 for further evaluation  Found recent rib fractures in right 9th and 10th ribs and chronic compression fractures of T6 and T7 along with mild compression fracture of T8  Patient incidentally tested positive for COVID CT imaging did show evidence of COVID pneumonia  He did require up to 5 L nasal cannula hospitalist service currently refusing supplemental oxygen  He reports a chronic cough productive of yellow sputum  No worsening respiratory symptoms compared to his baseline  Complaints of back pain worse than normal currently  Patient does not follow a pulmonologist   He has never worn oxygen  He has been told that he had COPD in the past but only his rescue inhaler infrequently  Smokes 4 packs a day  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  He did have exposures to asbestos in the past none currently  Currently disabled  Denies sick contacts  Denies recent travel  Bird exposure  He is vaccinated with Tawny Gross and [employer] and [employer]  Inpatient consult to Pulmonology  Consult performed by: Rhonda Mckya PA-C  Consult ordered by: Westley Godinez DO          Review of Systems   All other systems reviewed and are negative        Historical Information   Past Medical History:   Diagnosis Date    Alcohol abuse     Traore esophagus     Bowel obstruction (HCC)     Bowel perforation (HCC)     Cardiac disease     Continuous chronic alcoholism ([facility] [address] ) 10/5/2017    COPD (chronic obstructive pulmonary disease) (HCC)     History of shoulder surgery     Right shoulder    History of transfusion     Hx of cervical spine surgery     Hypertension     Incisional hernia 10/8/2017    MI, old     Mitral regurgitation     Psychiatric disorder     Seizures ([facility] [address] )      Past Surgical History:   Procedure Laterality Date    APPENDECTOMY      BACK SURGERY      CHOLECYSTECTOMY      ESOPHAGOGASTRODUODENOSCOPY N/A 11/28/2016    Procedure: ESOPHAGOGASTRODUODENOSCOPY (EGD); Surgeon: Tc Isaacs MD;  Location: [facility];   Service:    [address]      LAPAROTOMY N/A 10/25/2016    Procedure: LAPAROTOMY EXPLORATORY;  Surgeon: Dianne Dahl MD;  Location: [facility];  Service:     MOUTH SURGERY      PERCUTANEOUS PINNING FEMORAL NECK FRACTURE      SHOULDER SURGERY Right     SHOULDER SURGERY      SMALL INTESTINE SURGERY      STOMACH SURGERY      bal surgery     Social History   Social History     Substance and Sexual Activity   Alcohol Use Yes    Alcohol/week: 42 0 standard drinks    Types: 42 Cans of beer per week    Comment: a six pack a day     Social History     Substance and Sexual Activity   Drug Use No     Social History     Tobacco Use   Smoking Status Current Every Day Smoker    Packs/day: 3 00    Years: 35 00    Pack years: 105 00    Types: Cigarettes   Smokeless Tobacco Never Used     E-Cigarette/Vaping    E-Cigarette Use Former User      E-Cigarette/Vaping Substances    Nicotine No     THC No     CBD No     Flavoring No     Other No     Unknown No      Occupational History: see HPI    Family History:   Family History   Problem Relation Age of Onset    Breast cancer Mother     Prostate cancer Father     Skin cancer Brother        Meds/Allergies   all current active meds have been reviewed, pertinent pulmonary meds have been reviewed and current meds:   Current Facility-Administered Medications   Medication Dose Route Frequency    albuterol inhalation solution 2 5 mg  2 5 mg Nebulization Q6H PRN    baricitinib (OLUMIANT) tablet 4 mg  4 mg Oral Q24H    calcium carbonate (OYSTER SHELL,OSCAL) 500 mg tablet 1 tablet  1 tablet Oral Daily With Breakfast    cholecalciferol (VITAMIN D3) tablet 2,000 Units  2,000 Units Oral Daily    cyanocobalamin (VITAMIN B-12) tablet 100 mcg  100 mcg Oral Daily    dexamethasone (DECADRON) injection 6 mg  6 mg Intravenous Q24H    docusate sodium (COLACE) capsule 100 mg  100 mg Oral BID PRN    folic acid 1 mg, thiamine (VITAMIN B1) 100 mg in sodium chloride 0 9 % 100 mL IV piggyback   Intravenous Daily    heparin (porcine) subcutaneous injection 5,000 Units  5,000 Units Subcutaneous Q8H [address]    HYDROmorphone (DILAUDID) injection 1 mg  1 mg Intravenous Q4H PRN    Or    oxyCODONE (ROXICODONE) immediate release tablet 10 mg  10 mg Oral Q4H PRN    levETIRAcetam (KEPPRA) tablet 1,000 mg  1,000 mg Oral Q12H [address]    multivitamin-minerals (CENTRUM) tablet 1 tablet  1 tablet Oral Daily    ondansetron (ZOFRAN) injection 4 mg  4 mg Intravenous Q6H PRN    pancrelipase (Lip-Prot-Amyl) (CREON) delayed release capsule 6,000 Units  6,000 Units Oral TID With Meals    pantoprazole (PROTONIX) EC tablet 40 mg  40 mg Oral Early Morning    remdesivir (Veklury) 100 mg in sodium chloride 0 9 % 270 mL IVPB  100 mg Intravenous Q24H       No Known Allergies    Objective   Vitals: Blood pressure 110/71, pulse 78, temperature 97 9 °F (36 6 °C), resp  rate 18, height 5' 6" (1 676 m), weight 55 3 kg (122 lb), SpO2 92 %  room air,Body mass index is 19 69 kg/m²  Intake/Output Summary (Last 24 hours) at 5/23/2022 1042  Last data filed at 5/23/2022 0445  Gross per 24 hour   Intake 1200 ml   Output 1775 ml   Net -575 ml     Invasive Devices  Report    Peripheral Intravenous Line  Duration           Peripheral IV 05/20/22 Left;Ventral (anterior) Forearm 2 days                Physical Exam  Vitals and nursing note reviewed  Constitutional:       General: He is not in acute distress  Appearance: Normal appearance  HENT:      Head: Normocephalic and atraumatic  Nose: Nose normal       Mouth/Throat:      Mouth: Mucous membranes are moist       Pharynx: Oropharynx is clear  Eyes:      Extraocular Movements: Extraocular movements intact        Conjunctiva/sclera: Conjunctivae normal    Cardiovascular:      Rate and Rhythm: Normal rate and regular rhythm  Pulses: Normal pulses  Heart sounds: No murmur heard  Pulmonary:      Effort: Pulmonary effort is normal  No respiratory distress  Breath sounds: Wheezing present  Abdominal:      General: Bowel sounds are normal       Palpations: Abdomen is soft  Tenderness: There is no abdominal tenderness  Musculoskeletal:         General: Normal range of motion  Cervical back: Normal range of motion and neck supple  No muscular tenderness  Right lower leg: No edema  Left lower leg: No edema  Skin:     General: Skin is warm and dry  Neurological:      General: No focal deficit present  Mental Status: He is alert  Mental status is at baseline  Psychiatric:         Mood and Affect: Mood normal          Behavior: Behavior normal          Lab Results:   I have personally reviewed pertinent lab results  , ABG: No results found for: PHART, FNR9ONX, PO2ART, OXN7QIS, K4IEABJW, BEART, SOURCE, BNP: No results found for: BNP, CBC:   Lab Results   Component Value Date    WBC 8 46 05/23/2022    HGB 7 2 (L) 05/23/2022    HCT 23 1 (L) 05/23/2022    MCV 71 (L) 05/23/2022     (L) 05/23/2022    MCH 22 2 (L) 05/23/2022    MCHC 31 2 (L) 05/23/2022    RDW 25 5 (H) 05/23/2022    MPV 9 9 05/23/2022    NRBC 0 05/23/2022   , CMP:   Lab Results   Component Value Date    SODIUM 125 (L) 05/23/2022    K 3 5 05/23/2022    CL 88 (L) 05/23/2022    CO2 28 05/23/2022    BUN 8 05/23/2022    CREATININE 0 69 05/23/2022    CALCIUM 7 3 (L) 05/23/2022    AST 58 (H) 05/23/2022    ALT 32 05/23/2022    ALKPHOS 126 (H) 05/23/2022    EGFR 106 05/23/2022   , PT/INR: No results found for: PT, INR, Troponin: No results found for: TROPONINI      Imaging Studies: I have personally reviewed pertinent reports  and I have personally reviewed pertinent films in PACS     CT chest abdomen pelvis without contrast 05/20/2022  My interpretation:  Subsegmental atelectasis of the lingula  Lungs otherwise clear    No pleural effusion or pneumothorax  Official IMPRESSION:     1   Limited examination due to absence of IV contrast   2     3   Mild ectasia of the ascending aorta, measuring up to 4 0 cm      4   Lingular subsegmental atelectasis      5  Hepatic steatosis      6   Persistent pseudocyst in the pancreatic head, decreased in size since a CT from 10/30/2021      7   Nonspecific mild bladder wall thickening      8  No evidence of acute injury in the chest, abdomen or pelvis      9  Chronic compression fractures of T6 and T7  Healed fracture of the left inferior pubic ramus and several healed right-sided rib fractures      10  Mild compression fracture of T8, involving the inferior endplate, age indeterminate although developing since 10/30/2021      11  Recent appearing fractures of the right 9th and 10th ribs at their costovertebral junctions  EKG, Pathology, and Other Studies: I have personally reviewed pertinent reports  Echo pending    Pulmonary Results (PFTs, PSG): none to review today      VTE Prophylaxis: Heparin    Code Status: Level 1 - Full Code    Portions of the record may have been created with voice recognition software  Occasional wrong word or "sound a like" substitutions may have occurred due to the inherent limitations of voice recognition software  Read the chart carefully and recognize, using context, where substitutions have occurred

## 2022-05-23 NOTE — RESPIRATORY THERAPY NOTE
1557 started pt who is  covid positive  on nebulizer with filter after speaking to pulmonary PA  who ordered xop 1 25/Atr nebs TID  pulm  PA requested neb with filter over inhaler  Pt did the tx reluctantly and stated he doesn't want them  Stated he rather have an inhaler which he seldom uses anyway  Pulmonary PA  Jamshid notified pt prefers not to do nebulizer

## 2022-05-23 NOTE — ASSESSMENT & PLAN NOTE
· Likely due to liver disease  · Improved  · Follow the platelet count  · Initiated on Lovenox in the setting of elevated D-dimer, COVID infection    Results from last 7 days   Lab Units 05/23/22  0507 05/22/22  0423 05/21/22 2022 05/21/22  0622   WBC Thousand/uL 8 46 6 73  --  4 63   HEMOGLOBIN g/dL 7 2* 7 3* 7 7* 7 1*   HEMATOCRIT % 23 1* 24 2* 25 0* 22 8*   PLATELETS Thousands/uL 119* 95*  --  79*

## 2022-05-23 NOTE — ASSESSMENT & PLAN NOTE
· Check the patient's stool for occult blood x 3 specimens  · Follow the CBC  · Transfuse for a hemoglobin less than 7 g/dl    Results from last 7 days   Lab Units 05/23/22  0507 05/22/22  0423 05/21/22 2022 05/21/22  0622   WBC Thousand/uL 8 46 6 73  --  4 63   HEMOGLOBIN g/dL 7 2* 7 3* 7 7* 7 1*   HEMATOCRIT % 23 1* 24 2* 25 0* 22 8*   PLATELETS Thousands/uL 119* 95*  --  79*      Latest Reference Range & Units 05/21/22 06:22   Iron 65 - 175 ug/dL 38 (L) [1]   Ferritin 8 - 388 ng/mL 301   Iron Saturation 20 - 50 % 13 (L)   TIBC 250 - 450 ug/dL 292   Folate 3 1 - 17 5 ng/mL 10 8 [2]   (L): Data is abnormally low  [1] Patients treated with metal-binding drugs (ie  Deferoxamine) may have depressed iron values  [2] Slightly Hemolyzed;  Results May be Affected     Latest Reference Range & Units 05/21/22 06:22   Vitamin B-12 100 - 900 pg/mL 1,251 (H)   (H): Data is abnormally high

## 2022-05-23 NOTE — ASSESSMENT & PLAN NOTE
· Had 5-6 episodes at home over the last few weeks, which he feels may represent seizure activity  · Continue PO keppra  · Monitor for any seizure activity

## 2022-05-23 NOTE — ASSESSMENT & PLAN NOTE
· Patient persistently hypomagnesemic, continue replacement and monitoring    Results from last 7 days   Lab Units 05/23/22  0507 05/22/22  0423 05/21/22 2022   MAGNESIUM mg/dL 1 0* 1 3* 1 8

## 2022-05-23 NOTE — ASSESSMENT & PLAN NOTE
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 also secondary to chronic alcohol abuse  · Also with a component of hypovolemic hyponatremia with nausea and a decreased PO intake over the last few days  · Received NSS IV fluids with 20 mEq KCl at 150 ml/hr for a total of 1000 ml on 05/20/2022  · Initiated a 1500 ml/24 hour fluid restriction on 05/21/2022  · The hyponatremia is improving with the fluid restriction  · The patient was seen in consultation by Nephrology, will follow recommendations   · Follow the sodium level      Results from last 7 days   Lab Units 05/23/22  0507 05/22/22  0423 05/21/22 2022   SODIUM mmol/L 125* 123* 122*   POTASSIUM mmol/L 3 5 3 6 4 2   CHLORIDE mmol/L 88* 88* 87*   CO2 mmol/L 28 28 25   BUN mg/dL 8 9 8   CREATININE mg/dL 0 69 0 72 0 85   CALCIUM mg/dL 7 3* 7 3* 7 6*      Latest Reference Range & Units 05/21/22 06:22   TSH 3RD GENERATON 0 450 - 4 500 uIU/mL 1 445 [1]   [1] Adult TSH (3rd generation) reference range follows the recommended guidelines of the [employer], January, 2020

## 2022-05-23 NOTE — PHYSICAL THERAPY NOTE
Physical Therapy Cancellation Note               05/23/22 1438   PT Last Visit   PT Visit Date 05/23/22   Note Type   Cancel Reasons Patient off floor/test

## 2022-05-23 NOTE — ASSESSMENT & PLAN NOTE
· Monitor for signs of delirium tremens  · Utilize the CIWA protocol  · Utilize folic acid 1 mg IV Qdaily and thiamine 100 mg IV Qdaily  · Daily PO multivitamin  · Alcohol cessation counseling

## 2022-05-23 NOTE — PROGRESS NOTES
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   Follow-up results of cortisol when available  3  Hypokalemia   Monitor with potassium phosphate administration  4  Hypomagnesemia   Will provide magnesium sulfate 2 g IV  5  Hypophosphatemia     improved  Will provide potassium phosphate 12 millimole  6  COVID positive  Intermittently requiring supplemental oxygen via nasal cannula  On remdesivir per protocol  7  Alcoholism   Management per hospitalist colleagues      Follow up reason for today's visit:     Hyponatremia    Patient Active Problem List   Diagnosis    Tobacco abuse    Essential hypertension    H/O suicide attempt    H/O cervical spine surgery    Hyponatremia    Dietary folate deficiency anemia    Ventral hernia without obstruction or gangrene    Hepatomegaly    Hepatic steatosis    Hypomagnesemia    Elevated alkaline phosphatase level    Alcoholic hepatitis without ascites    Vitamin D deficiency    Iron deficiency    Urinary retention    Bladder wall thickening    Hypophosphatemia    Generalized weakness    Malnutrition of moderate degree (HCC)    Hypokalemia    Continuous chronic alcoholism (HCC)    Incisional hernia    Hx of seizure disorder    Seizure-like activity (HCC)    Diarrhea    Abnormality of pancreatic duct    Allergic rhinitis    Microcytic anemia    Abdominal wound dehiscence    Cervical disc disorder with myelopathy    Cervical spinal stenosis    Depression    Left foot drop    Lumbar radiculopathy    Neuropathy involving both lower extremities    Peripheral neuropathy    T6 vertebral fracture (HCC)    Alcoholic cirrhosis of liver without ascites ([facility] [address] )    Thrombocytopenia (HCC)    Closed fracture of left olecranon process, initial encounter    Iron deficiency anemia due to chronic blood loss    Duodenal ulcer    Tubular adenoma    Traore esophagus    Celiac artery stenosis (HCC)    Pancreatic mass    Pancytopenia (HCC)    Constipation    Transaminitis    Lesion of spleen    Left anterior fascicular block    Abnormal EKG    Acute on chronic pancreatitis (HCC)    Pancreatic pseudocyst    COPD (chronic obstructive pulmonary disease) (HCC)    Ileus ([address]    Ambulatory dysfunction    Current every day smoker    Fall    Hx of duodenal ulcer    Neck pain    Alcohol use    Thoracic compression fracture (HCC)    Aortic ectasia (HCC)    Rib fractures    Seizure disorder (HCC)    Hypoxia    Traore's esophagus    Elevated d-dimer    COVID-19 virus infection    Hypocalcemia         Subjective:   Cough with white sputum  A complete 10 point review of systems was performed and is otherwise negative  Objective:     Vitals: Blood pressure 109/71, pulse 75, temperature 97 9 °F (36 6 °C), temperature source Oral, resp  rate 18, height 5' 6" (1 676 m), weight 55 3 kg (122 lb), SpO2 90 %  ,Body mass index is 19 69 kg/m²  Weight (last 2 days)     Date/Time Weight    05/23/22 0632 55 3 (122)            Intake/Output Summary (Last 24 hours) at 5/23/2022 1324  Last data filed at 5/23/2022 1149  Gross per 24 hour   Intake 1380 ml   Output 2200 ml   Net -820 ml     I/O last 3 completed shifts: In: 1680 [P O :1680]  Out: 2525 [Urine:2525]         Physical Exam: /71 (BP Location: Right arm)   Pulse 75   Temp 97 9 °F (36 6 °C) (Oral)   Resp 18   Ht 5' 6" (1 676 m)   Wt 55 3 kg (122 lb)   SpO2 90%   BMI 19 69 kg/m²     General Appearance:    No acute distress  Cooperative  Appears stated age  Head:    Normocephalic  Atraumatic  Normal jaw occlusion  Eyes:    Lids, conjunctiva normal  No scleral icterus  Ears:    Normal external ears  Nose:   Nares normal  No drainage  Mouth:   Lips, tongue normal  Mucosa normal  Phonation normal    Neck:   Supple  Symmetrical    Back:     Symmetric  No CVA tenderness  Lungs:     Normal respiratory effort  Clear to auscultation bilaterally  Chest wall:    No tenderness or deformity  Heart:    Regular rate and rhythm  Normal S1 and S2  No murmur  No JVD  No edema  Abdomen:     Soft  Non-tender  Bowel sounds active  Genitourinary:   No Tabares catheter present  Extremities:   Extremities normal  Atraumatic  No cyanosis  Skin:   Warm and dry  No pallor, jaundice, rash, ecchymoses  Neurologic:   Alert and oriented to person, place, time  No focal deficit  Lab, Imaging and other studies: I have personally reviewed pertinent labs  CBC:   Lab Results   Component Value Date    WBC 8 46 05/23/2022    HGB 7 2 (L) 05/23/2022    HCT 23 1 (L) 05/23/2022    MCV 71 (L) 05/23/2022     (L) 05/23/2022    MCH 22 2 (L) 05/23/2022    MCHC 31 2 (L) 05/23/2022    RDW 25 5 (H) 05/23/2022    MPV 9 9 05/23/2022    NRBC 0 05/23/2022     CMP:   Lab Results   Component Value Date    K 3 5 05/23/2022    CL 88 (L) 05/23/2022    CO2 28 05/23/2022    BUN 8 05/23/2022    CREATININE 0 69 05/23/2022    CALCIUM 7 3 (L) 05/23/2022    AST 58 (H) 05/23/2022    ALT 32 05/23/2022    ALKPHOS 126 (H) 05/23/2022    EGFR 106 05/23/2022           Results from last 7 days   Lab Units 05/23/22  0507 05/22/22  0423 05/21/22 2022 05/21/22  0622   POTASSIUM mmol/L 3 5 3 6 4 2 3 3*   CHLORIDE mmol/L 88* 88* 87* 84*   CO2 mmol/L 28 28 25 28   BUN mg/dL 8 9 8 8   CREATININE mg/dL 0 69 0 72 0 85 0 77   CALCIUM mg/dL 7 3* 7 3* 7 6* 7 4*   ALK PHOS U/L 126* 109  --  111   ALT U/L 32 31  --  28   AST U/L 58* 48*  --  73*         Phosphorus:   Lab Results   Component Value Date    PHOS 2 6 (L) 05/23/2022     Magnesium:   Lab Results   Component Value Date    MG 1 0 (L) 05/23/2022     Urinalysis: No results found for: COLORU, CLARITYU, Ellen Bearded, LEUKOCYTESUR, NITRITE, PROTEINUA, GLUCOSEU, KETONESU, BILIRUBINUR, BLOODU  Ionized Calcium: No results found for: CAION  Coagulation: No results found for: PT, INR, APTT  Troponin: No results found for: TROPONINI  ABG: No results found for: PHART, UIP0AIX, PO2ART, DOO6WFR, J5VEZZZP, BEART, SOURCE  Radiology review:     IMAGING  Procedure: Echo complete w/ contrast if indicated    Result Date: 5/23/2022  Narrative: Megan Chenvilma  Left Ventricle: Left ventricular cavity size is normal  Wall thickness is normal  The left ventricular ejection fraction is 50%  Systolic function is low normal    The following segments are hypokinetic: apical anterior, apical septal, apical inferior, apical lateral and apex    All other segments are normal    Aortic Valve: There is mild and There is mild to moderate stenosis  The aortic valve mean gradient is 9 mmHg  The DVI is 0 39    Tricuspid Valve: There is mild regurgitation         Current Facility-Administered Medications   Medication Dose Route Frequency    albuterol inhalation solution 2 5 mg  2 5 mg Nebulization Q6H PRN    baricitinib (OLUMIANT) tablet 4 mg  4 mg Oral Q24H    calcium carbonate (OYSTER SHELL,OSCAL) 500 mg tablet 1 tablet  1 tablet Oral Daily With Breakfast    cholecalciferol (VITAMIN D3) tablet 2,000 Units  2,000 Units Oral Daily    cyanocobalamin (VITAMIN B-12) tablet 100 mcg  100 mcg Oral Daily    docusate sodium (COLACE) capsule 100 mg  100 mg Oral BID PRN    enoxaparin (LOVENOX) subcutaneous injection 60 mg  1 mg/kg Subcutaneous F52A KIMBERLY    folic acid 1 mg, thiamine (VITAMIN B1) 100 mg in sodium chloride 0 9 % 100 mL IV piggyback   Intravenous Daily    HYDROmorphone (DILAUDID) injection 1 mg  1 mg Intravenous Q4H PRN    Or    oxyCODONE (ROXICODONE) immediate release tablet 10 mg  10 mg Oral Q4H PRN    ipratropium (ATROVENT) 0 02 % inhalation solution 0 5 mg  0 5 mg Nebulization TID    levalbuterol (XOPENEX) inhalation solution 1 25 mg  1 25 mg Nebulization TID    levETIRAcetam (KEPPRA) tablet 1,000 mg  1,000 mg Oral Q12H [facility]    methylPREDNISolone sodium succinate (Solu-MEDROL) injection 40 mg  40 mg Intravenous Q12H [facility]    multivitamin-minerals (CENTRUM) tablet 1 tablet  1 tablet Oral Daily    ondansetron (ZOFRAN) injection 4 mg  4 mg Intravenous Q6H PRN    pancrelipase (Lip-Prot-Amyl) (CREON) delayed release capsule 6,000 Units  6,000 Units Oral TID With Meals    pantoprazole (PROTONIX) EC tablet 40 mg  40 mg Oral Early Morning    remdesivir (Veklury) 100 mg in sodium chloride 0 9 % 270 mL IVPB  100 mg Intravenous Q24H     Medications Discontinued During This Encounter   Medication Reason    sodium chloride 0 9 % infusion     oxyCODONE (ROXICODONE) IR tablet 5 mg     HYDROmorphone (DILAUDID) injection 0 5 mg     cholecalciferol (VITAMIN D3) tablet 1,000 Units     nicotine (NICODERM CQ) 14 mg/24hr TD 24 hr patch 1 patch     dexamethasone (DECADRON) injection 6 mg     heparin (porcine) subcutaneous injection 5,000 Units        Luzma Harvey PA-C    Portions of the record may have been created with voice recognition software  Occasional wrong word or "sound a like" substitutions may have occurred due to the inherent limitations of voice recognition software  Read the chart carefully and recognize, using context, where substitutions have occurred

## 2022-05-23 NOTE — PLAN OF CARE
Problem: Nutrition/Hydration-ADULT  Goal: Nutrient/Hydration intake appropriate for improving, restoring or maintaining nutritional needs  Description: Monitor and assess patient's nutrition/hydration status for malnutrition  Collaborate with interdisciplinary team and initiate plan and interventions as ordered  Monitor patient's weight and dietary intake as ordered or per policy  Utilize nutrition screening tool and intervene as necessary  Determine patient's food preferences and provide high-protein, high-caloric foods as appropriate       INTERVENTIONS:  - Monitor oral intake, urinary output, labs, and treatment plans  - Assess nutrition and hydration status and recommend course of action  - Evaluate amount of meals eaten  - Assist patient with eating if necessary   - Allow adequate time for meals  - Recommend/ encourage appropriate diets, oral nutritional supplements, and vitamin/mineral supplements  - Order, calculate, and assess calorie counts as needed  - Recommend, monitor, and adjust tube feedings and TPN/PPN based on assessed needs  - Assess need for intravenous fluids  - Provide specific nutrition/hydration education as appropriate  - Include patient/family/caregiver in decisions related to nutrition  Outcome: Progressing     Problem: MOBILITY - ADULT  Goal: Maintain or return to baseline ADL function  Description: INTERVENTIONS:  -  Assess patient's ability to carry out ADLs; assess patient's baseline for ADL function and identify physical deficits which impact ability to perform ADLs (bathing, care of mouth/teeth, toileting, grooming, dressing, etc )  - Assess/evaluate cause of self-care deficits   - Assess range of motion  - Assess patient's mobility; develop plan if impaired  - Assess patient's need for assistive devices and provide as appropriate  - Encourage maximum independence but intervene and supervise when necessary  - Involve family in performance of ADLs  - Assess for home care needs following discharge   - Consider OT consult to assist with ADL evaluation and planning for discharge  - Provide patient education as appropriate  Outcome: Progressing  Goal: Maintains/Returns to pre admission functional level  Description: INTERVENTIONS:  - Perform BMAT or MOVE assessment daily    - Set and communicate daily mobility goal to care team and patient/family/caregiver  - Collaborate with rehabilitation services on mobility goals if consulted  - Perform Range of Motion 4 times a day  - Reposition patient every 2 hours    - Dangle patient 3 times a day  - Stand patient 3 times a day  - Ambulate patient 3 times a day  - Out of bed to chair 3 times a day   - Out of bed for meals 3 times a day  - Out of bed for toileting  - Record patient progress and toleration of activity level   Outcome: Progressing     Problem: Potential for Falls  Goal: Patient will remain free of falls  Description: INTERVENTIONS:  - Educate patient/family on patient safety including physical limitations  - Instruct patient to call for assistance with activity   - Consult OT/PT to assist with strengthening/mobility   - Keep Call bell within reach  - Keep bed low and locked with side rails adjusted as appropriate  - Keep care items and personal belongings within reach  - Initiate and maintain comfort rounds  - Make Fall Risk Sign visible to staff  - Offer Toileting every 2 Hours, in advance of need  - Initiate/Maintain fall alarm  - Obtain necessary fall risk management equipment: alarm, nonskid socks, yellow bracelet  - Apply yellow socks and bracelet for high fall risk patients  - Consider moving patient to room near nurses station  Outcome: Progressing     Problem: PAIN - ADULT  Goal: Verbalizes/displays adequate comfort level or baseline comfort level  Description: Interventions:  - Encourage patient to monitor pain and request assistance  - Assess pain using appropriate pain scale  - Administer analgesics based on type and severity of pain and evaluate response  - Implement non-pharmacological measures as appropriate and evaluate response  - Consider cultural and social influences on pain and pain management  - Notify physician/advanced practitioner if interventions unsuccessful or patient reports new pain  Outcome: Progressing     Problem: INFECTION - ADULT  Goal: Absence or prevention of progression during hospitalization  Description: INTERVENTIONS:  - Assess and monitor for signs and symptoms of infection  - Monitor lab/diagnostic results  - Monitor all insertion sites, i e  indwelling lines, tubes, and drains  - Monitor endotracheal if appropriate and nasal secretions for changes in amount and color  - Meally appropriate cooling/warming therapies per order  - Administer medications as ordered  - Instruct and encourage patient and family to use good hand hygiene technique  - Identify and instruct in appropriate isolation precautions for identified infection/condition  Outcome: Progressing     Problem: SAFETY ADULT  Goal: Maintain or return to baseline ADL function  Description: INTERVENTIONS:  -  Assess patient's ability to carry out ADLs; assess patient's baseline for ADL function and identify physical deficits which impact ability to perform ADLs (bathing, care of mouth/teeth, toileting, grooming, dressing, etc )  - Assess/evaluate cause of self-care deficits   - Assess range of motion  - Assess patient's mobility; develop plan if impaired  - Assess patient's need for assistive devices and provide as appropriate  - Encourage maximum independence but intervene and supervise when necessary  - Involve family in performance of ADLs  - Assess for home care needs following discharge   - Consider OT consult to assist with ADL evaluation and planning for discharge  - Provide patient education as appropriate  Outcome: Progressing  Goal: Maintains/Returns to pre admission functional level  Description: INTERVENTIONS:  - Perform BMAT or MOVE assessment daily    - Set and communicate daily mobility goal to care team and patient/family/caregiver  - Collaborate with rehabilitation services on mobility goals if consulted  - Perform Range of Motion 4 times a day  - Reposition patient every 2 hours    - Dangle patient 3 times a day  - Stand patient 3 times a day  - Ambulate patient 3 times a day  - Out of bed to chair 3 times a day   - Out of bed for meals 3 times a day  - Out of bed for toileting  - Record patient progress and toleration of activity level   Outcome: Progressing  Goal: Patient will remain free of falls  Description: INTERVENTIONS:  - Educate patient/family on patient safety including physical limitations  - Instruct patient to call for assistance with activity   - Consult OT/PT to assist with strengthening/mobility   - Keep Call bell within reach  - Keep bed low and locked with side rails adjusted as appropriate  - Keep care items and personal belongings within reach  - Initiate and maintain comfort rounds  - Make Fall Risk Sign visible to staff  - Offer Toileting every 2 Hours, in advance of need  - Initiate/Maintain fall alarm  - Obtain necessary fall risk management equipment: alarm, nonskid socks, yellow bracelet  - Apply yellow socks and bracelet for high fall risk patients  - Consider moving patient to room near nurses station  Outcome: Progressing     Problem: DISCHARGE PLANNING  Goal: Discharge to home or other facility with appropriate resources  Description: INTERVENTIONS:  - Identify barriers to discharge w/patient and caregiver  - Arrange for needed discharge resources and transportation as appropriate  - Identify discharge learning needs (meds, wound care, etc )  - Arrange for interpretive services to assist at discharge as needed  - Refer to Case Management Department for coordinating discharge planning if the patient needs post-hospital services based on physician/advanced practitioner order or complex needs related to functional status, cognitive ability, or social support system  Outcome: Progressing     Problem: Knowledge Deficit  Goal: Patient/family/caregiver demonstrates understanding of disease process, treatment plan, medications, and discharge instructions  Description: Complete learning assessment and assess knowledge base    Interventions:  - Provide teaching at level of understanding  - Provide teaching via preferred learning methods  Outcome: Progressing     Problem: RESPIRATORY - ADULT  Goal: Achieves optimal ventilation and oxygenation  Description: INTERVENTIONS:  - Assess for changes in respiratory status  - Assess for changes in mentation and behavior  - Position to facilitate oxygenation and minimize respiratory effort  - Oxygen administered by appropriate delivery if ordered  - Initiate smoking cessation education as indicated  - Encourage broncho-pulmonary hygiene including cough, deep breathe, Incentive Spirometry  - Assess the need for suctioning and aspirate as needed  - Assess and instruct to report SOB or any respiratory difficulty  - Respiratory Therapy support as indicated  Outcome: Progressing     Problem: GENITOURINARY - ADULT  Goal: Maintains or returns to baseline urinary function  Description: INTERVENTIONS:  - Assess urinary function  - Encourage oral fluids to ensure adequate hydration if ordered  - Administer IV fluids as ordered to ensure adequate hydration  - Administer ordered medications as needed  - Offer frequent toileting  - Follow urinary retention protocol if ordered  Outcome: Progressing  Goal: Absence of urinary retention  Description: INTERVENTIONS:  - Assess patients ability to void and empty bladder  - Monitor I/O  - Bladder scan as needed  - Discuss with physician/AP medications to alleviate retention as needed  - Discuss catheterization for long term situations as appropriate  Outcome: Progressing     Problem: METABOLIC, FLUID AND ELECTROLYTES - ADULT  Goal: Electrolytes maintained within normal limits  Description: INTERVENTIONS:  - Monitor labs and assess patient for signs and symptoms of electrolyte imbalances  - Administer electrolyte replacement as ordered  - Monitor response to electrolyte replacements, including repeat lab results as appropriate  - Instruct patient on fluid and nutrition as appropriate  Outcome: Progressing  Goal: Fluid balance maintained  Description: INTERVENTIONS:  - Monitor labs   - Monitor I/O and WT  - Instruct patient on fluid and nutrition as appropriate  - Assess for signs & symptoms of volume excess or deficit  Outcome: Progressing     Problem: MUSCULOSKELETAL - ADULT  Goal: Maintain or return mobility to safest level of function  Description: INTERVENTIONS:  - Assess patient's ability to carry out ADLs; assess patient's baseline for ADL function and identify physical deficits which impact ability to perform ADLs (bathing, care of mouth/teeth, toileting, grooming, dressing, etc )  - Assess/evaluate cause of self-care deficits   - Assess range of motion  - Assess patient's mobility  - Assess patient's need for assistive devices and provide as appropriate  - Encourage maximum independence but intervene and supervise when necessary  - Involve family in performance of ADLs  - Assess for home care needs following discharge   - Consider OT consult to assist with ADL evaluation and planning for discharge  - Provide patient education as appropriate  Outcome: Progressing  Goal: Maintain proper alignment of affected body part  Description: INTERVENTIONS:  - Support, maintain and protect limb and body alignment  - Provide patient/ family with appropriate education  Outcome: Progressing

## 2022-05-23 NOTE — ASSESSMENT & PLAN NOTE
· Initially requiered 3 lpm of continuous supplemental oxygen to maintain oxygen saturations of 90% and above  · Overnight from 05/21/2022-05/22/2022 developed worsening hypoxia and required up to 5 lpm of continuous supplemental oxygen to maintain oxygen saturation levels of 90% and above  · Changed to the "Moderate" COVID-19 treatment algorithm on 05/22/2022  · Unfortunately patient was refusing supplemental oxygen, currently oxygen saturations lingering around 90% on room air  · Received remdesivir 200 mg IV x 1 dose on 05/20/2022 and continue remdesivir 100 mg IV every 24 hours (day #3 of the treatment course)  · Was treated with dexamethasone 6 mg IV every 24 hours (day #3) - now change to weight based per pulmonology  · Initiated baricitinib 4 mg PO Qdaily (day #1) on 05/22/2022, continue  · The procalcitonin level was within normal limits x 2 sets    · Respiratory protocol  · Incentive spirometry  · Initiated on Lovenox for anticoagulation with no evidence of bleeding, improving thrombocytopenia - monitor CBC closely  · Utilize heparin 5000 units SQ every 8 hours for now  · Consulted Pulmonology  · Venous duplex, V/Q scan pending  · PT/OT

## 2022-05-23 NOTE — UTILIZATION REVIEW
Initial Clinical Review    Admission: Date/Time/Statement:   Admission Orders (From admission, onward)     Ordered        05/20/22 0958  Inpatient Admission  Once                      Orders Placed This Encounter   Procedures    Inpatient Admission     Standing Status:   Standing     Number of Occurrences:   1     Order Specific Question:   Level of Care     Answer:   Med Surg [16]     Order Specific Question:   Estimated length of stay     Answer:   More than 2 Midnights     Order Specific Question:   Certification     Answer:   I certify that inpatient services are medically necessary for this patient for a duration of greater than two midnights  See H&P and MD Progress Notes for additional information about the patient's course of treatment  ED Arrival Information     Expected   -    Arrival   5/20/2022 07:41    Acuity   Emergent            Means of arrival   Wheelchair    Escorted by   Family Member    Service   Hospitalist    Admission type   Emergency            Arrival complaint   Neck-Body Pain/Headache/Fall           Chief Complaint   Patient presents with    Fall     Patient reports "Losing balance and falling 6 times in 30min time two days ago  Since has had dizziness/blurred vision/headaches"       Initial Presentation: 55 y o  male presents to ed from home for evaluation and treatment of dizziness, blurred vision and headaches since losing balance and falling 6 times in 30 minutes 2 days ago  Patient reports head strike  He also reports lightheadedness at the time of falls  PMHX:  ETOH, BOWEL PERFORATION, COPD, HTN, MI, SEIZURES  Clinical assessment significant for rhonchi,  pain 9 10  O2 sat 88%  VBS: 7 445 / 42 2 / 32 / 28 3  MAG 1 1, HB 8 5, , K+ 2 3, , SERUM OSMOLALITY 253  COVID positive  Imaging shows mild compression fractures age indeterminate, known rib fractures and new appearing R 9th &10th ribs  Ekg shows non specific T wave abnormalities    Initially treated with iv fentanyl, po tylenol, iv mag so4, topical lidoderm, iv kcl 20 meq  Admit to inpatient med surg for hyponatremia, hypoxia, hypomagnesmia, alcohol abuse, rib fractures, anemia, hypokalemia, covid 19  Date: 5-21-22    Day 2:  INPATIENT MED SURG  Plan to continue iv solumedrol, iv remdesivir, po baricitinib  Unable to use iv heparin for covid elevate d-dimer due to thrombocytopenia and worsening anemia  Start sq heparin  Obtain PT/OT evaluations  Serial ciwa assessments  ciwa currently =0  Trend Hb and Bmp  Urine osmolality 211  Fluid restriction  O2 sats 93% ra  Concern that described events of falling may be related to seizures  Received iv mag so4 x1 today due to persistent low mag of 1 5  Received iv k phos x1 for persistently ow potassium 3 3  Nephrology consulted        Consult Nephrology    1  Hyponatremia              Most likely due to SIADH as opposed to other solute abnormality  Continue 1 5 L fluid restriction, I hope is that the patient's serum sodium level can increase by at least 4-5 millimole/L over the next 24 hours  May require stricter fluid intake versus addition of tolvaptan depending on high progresses clinically  At this agent will be made tomorrow morning  Patient had a TSH done at presentation which was normal, will check a serum cortisol level tomorrow morning  2  Probable underlying SIADH              As mentioned above will complete workup with a cortisol level morning, continue fluid restriction of potential need for more strict reduction in total fluid intake over the course of 24 hours  Patient may require tolvaptan going forward  3  Hypokalemia              Agree with aggressive potassium supplementation, patient will receive a total of 80 mEq of oral potassium chloride  4  Hypomagnesemia              Continue with IV magnesium supplementation  Date: 5-22-22 Day 3:  INPATIENT MED SURG  Patient complains of chest and back pain    Monitor on telemetry  Consult Pulmonology for hypoxia  Oxygen requirement increasing from 3-4L to 5L to maintain sats at lease 88%  Ciwa =0  Continue treatment of covid with iv solumedrol, iv remdesivir , sq heparin and po baricitinib  Date: 5-23-22 Day 4  INPATIENT MED SURG  Obtain ECHO, VQ scan and venous duplex  Hypoxia persists  Monitor on telemetry            ED Triage Vitals   05/20/22 0750 05/20/22 0750 05/20/22 0750 05/20/22 0750 05/20/22 0750   98 1 °F (36 7 °C) 94 16 124/81 94 %      Temporal Monitor         Pain Score       9          05/20/22 55 3 kg (122 lb)     Additional Vital Signs:      Date/Time Temp Pulse Resp BP MAP (mmHg) SpO2 Nasal Cannula O2 Flow Rate (L/min) O2 Device   05/23/22 0204 -- -- -- -- -- 89 % Abnormal  -- --   05/23/22 01:09:31 97 9 °F (36 6 °C) 70 19 107/70 82 91 % -- --   05/22/22 22:40:29 -- 79 16 120/77 91 93 % -- --   05/22/22 2105 -- -- -- -- -- -- -- None (Room air)   05/22/22 14:42:39 98 1 °F (36 7 °C) 85 20 110/73 85 92 % 5 L/min Nasal cannula   05/22/22 08:31:40 98 1 °F (36 7 °C) 83 20 114/71 85 88 % Abnormal  -- --   05/22/22 0647 -- -- -- -- -- 96 % -- --   05/22/22 0602 -- -- -- -- -- 94 % -- --   05/22/22 0556 -- -- -- -- -- 90 % -- --   05/22/22 0552 -- -- -- -- -- 91 % -- --   05/22/22 0550 -- -- -- -- -- 90 % 5 L/min --   05/22/22 0548 -- -- -- -- -- 88 % Abnormal  -- --   05/22/22 0545 -- -- -- -- -- 88 % Abnormal  -- --   05/22/22 0530 -- -- -- -- -- 84 % Abnormal  -- --   05/22/22 0527 -- -- -- -- -- 94 % -- --   05/22/22 0523 -- -- -- -- -- 94 % -- --   05/22/22 0512 -- -- -- -- -- 94 % -- --   05/22/22 0454 -- -- -- -- -- 95 % -- --   05/22/22 0448 -- -- -- -- -- 93 % -- --   05/22/22 0445 -- -- -- -- -- 91 % 4 L/min --   05/22/22 0435 -- -- -- -- -- -- 3 5 L/min --   05/22/22 04:06:36 97 9 °F (36 6 °C) 65 22 116/72 87 88 % Abnormal  -- --   05/21/22 2243 -- -- -- -- -- -- -- None (Room air)   05/21/22 18:46:58 97 4 °F (36 3 °C)   Abnormal  84 18 120/70 87 95 % -- --   05/21/22 08:28:07 97 7 °F (36 5 °C) 102 21 115/82 93 93 % -- --   05/21/22 03:09:16 97 9 °F (36 6 °C) 82 18 127/81 96 93 % -- None (Room air)             Pertinent Labs/Diagnostic Test Results:     Collection Time Result Time Vent R Atrial R TN Int  QRSD Int  QT Int  QTC Int  P Axis QRS Axis T Wave Ax    05/20/22 07:52:11 05/20/22 08:12:32 90 90 154 94 408 499 75 -47 88                       Normal sinus rhythm   Left anterior fascicular block   Nonspecific T wave abnormality   Abnormal ECG   When compared with ECG of 22-NOV-2020 13:58,   Criteria for Septal infarct are no longer Present   ST now depressed in Anterior leads   Nonspecific T wave abnormality, worse in Lateral leads                         TRAUMA - CT head wo contrast   Final  (05/20 0844)      No acute intracranial abnormality  Microangiopathic changes  Moderate diffuse parenchymal volume loss  Pansinus mucosal thickening  TRAUMA - CT spine cervical wo contrast   Final  (05/20 0845)      No cervical spine fracture or traumatic malalignment  Unchanged fracture of the right-sided screw at T1  Hardware is unchanged in appearance  Multilevel degenerative change of the spine  CT chest abdomen pelvis wo contrast   Final  (05/20 0916)      1  Limited examination due to absence of IV contrast    2      3   Mild ectasia of the ascending aorta, measuring up to 4 0 cm       4   Lingular subsegmental atelectasis  5   Hepatic steatosis  6   Persistent pseudocyst in the pancreatic head, decreased in size since a CT from 10/30/2021       7   Nonspecific mild bladder wall thickening  8   No evidence of acute injury in the chest, abdomen or pelvis  9   Chronic compression fractures of T6 and T7  Healed fracture of the left inferior pubic ramus and several healed right-sided rib fractures        10   Mild compression fracture of T8, involving the inferior endplate, age indeterminate although developing since 10/30/2021  11   Recent appearing fractures of the right 9th and 10th ribs at their costovertebral junctions  CT recon only thoracolumbar (no charge)   Final  (05/20 1009)      1  Chronic compression fractures of T2, T3, T6 and T7       2   Mild compression fractures of T8 4 and T8, age indeterminant, although developing since 10/30/2021       3   Chronic, healed fractures of the posterior portions of several ribs, bilaterally  4   Recent appearing fracture of the left sacral ala  5   Degenerative changes at several thoracic and lumbar levels  XR Trauma chest portable      Final  (05/20 1236)      No focal consolidation, pleural effusion, or pneumothorax  Known rib fractures are better seen on the concurrent chest CT          Results from last 7 days   Lab Units 05/20/22  1050   SARS-COV-2  Positive*     Results from last 7 days   Lab Units 05/23/22  0507 05/22/22  0423 05/21/22 2022 05/21/22  0622 05/20/22  0824   WBC Thousand/uL 8 46 6 73  --  4 63 5 22   HEMOGLOBIN g/dL 7 2* 7 3* 7 7* 7 1* 8 5*   HEMATOCRIT % 23 1* 24 2* 25 0* 22 8* 26 3*   PLATELETS Thousands/uL 119* 95*  --  79* 86*   NEUTROS ABS Thousands/µL 7 13 5 51  --  3 55 3 97         Results from last 7 days   Lab Units 05/23/22  0507 05/22/22  0423 05/21/22 2022 05/21/22  0622 05/20/22  2145 05/20/22  1504   SODIUM mmol/L 125* 123* 122* 121* 118* 117*   POTASSIUM mmol/L 3 5 3 6 4 2 3 3* 3 7 2 8*   CHLORIDE mmol/L 88* 88* 87* 84* 82* 81*   CO2 mmol/L 28 28 25 28 26 27   ANION GAP mmol/L 9 7 10 9 10 9   BUN mg/dL 8 9 8 8 10 10   CREATININE mg/dL 0 69 0 72 0 85 0 77 0 99 1 08   EGFR ml/min/1 73sq m 106 105 98 102 85 76   CALCIUM mg/dL 7 3* 7 3* 7 6* 7 4* 7 4* 7 4*   CALCIUM, IONIZED mmol/L  --  1 02*  --   --   --   --    MAGNESIUM mg/dL 1 0* 1 3* 1 8 1 5*  --  2 4   PHOSPHORUS mg/dL 2 6* 1 6*  --  1 4*  --  1 5*     Results from last 7 days   Lab Units 05/23/22  0507 05/22/22  0423 05/21/22  0622 05/20/22  0829 AST U/L 58* 48* 73* 101*   ALT U/L 32 31 28 43   ALK PHOS U/L 126* 109 111 120*   TOTAL PROTEIN g/dL 6 2* 6 4 6 5 6 9   ALBUMIN g/dL 2 5* 2 5* 2 7* 2 9*   TOTAL BILIRUBIN mg/dL 1 00 0 74 0 87 0 89   BILIRUBIN DIRECT mg/dL  --   --   --  0 49*         Results from last 7 days   Lab Units 05/23/22  0507 05/22/22  0423 05/21/22  2022 05/21/22  0622 05/20/22  2145 05/20/22  1504 05/20/22  0824   GLUCOSE RANDOM mg/dL 135 122 190* 120 166* 143* 91     Results from last 7 days   Lab Units 05/20/22  1300   OSMOLALITY, SERUM mmol/*         Results from last 7 days   Lab Units 05/20/22  0824   PH RODDY  7 445*   PCO2 RODDY mm Hg 42 2   PO2 RODDY mm Hg 32 0*   HCO3 RODDY mmol/L 28 3   BASE EXC RODDY mmol/L 3 9   O2 CONTENT RODDY ml/dL 7 3   O2 HGB, VENOUS % 53 7*         Results from last 7 days   Lab Units 05/21/22  0622   CK TOTAL U/L 104     Results from last 7 days   Lab Units 05/20/22  1300 05/20/22  1050 05/20/22  0824   HS TNI 0HR ng/L  --   --  11   HS TNI 2HR ng/L  --  11  --    HSTNI D2 ng/L  --  0  --    HS TNI 4HR ng/L 10  --   --    HSTNI D4 ng/L -1  --   --      Results from last 7 days   Lab Units 05/23/22  0507 05/22/22  0423 05/21/22  0622   D-DIMER QUANTITATIVE ug/ml FEU 1 73* 1 34* 1 37*     Results from last 7 days   Lab Units 05/21/22  0622 05/20/22  0824   PROTIME seconds 14 0 12 5   INR  1 14 0 98   PTT seconds 34  --      Results from last 7 days   Lab Units 05/21/22  0622 05/20/22  0824   TSH 3RD GENERATON uIU/mL 1 445 2 436     Results from last 7 days   Lab Units 05/23/22  0507 05/22/22  0423 05/21/22  0622   PROCALCITONIN ng/ml 0 09 0 16 0 15     Results from last 7 days   Lab Units 05/21/22  0622   LACTIC ACID mmol/L 1 0             Results from last 7 days   Lab Units 05/20/22  0824   NT-PRO BNP pg/mL 253*     Results from last 7 days   Lab Units 05/21/22  0622   FERRITIN ng/mL 301     Results from last 7 days   Lab Units 05/21/22  0622   HEP B S AG  Non-reactive   HEP C AB  Non-reactive   HEP B C IGM Non-reactive     Results from last 7 days   Lab Units 05/21/22  0622   LIPASE u/L 211     Results from last 7 days   Lab Units 05/23/22  0507 05/22/22  0423 05/21/22  0622   CRP mg/L 6 1* 6 7* 9 8*         Results from last 7 days   Lab Units 05/21/22  2118 05/20/22  1300   OSMOLALITY, SERUM mmol/KG  --  253*   OSMO UR mmol/*  --      Results from last 7 days   Lab Units 05/20/22  1549   CLARITY UA  Clear   COLOR UA  Yellow   SPEC GRAV UA  1 010   PH UA  6 0   GLUCOSE UA mg/dl Negative   KETONES UA mg/dl Negative   BLOOD UA  Negative   PROTEIN UA mg/dl Negative   NITRITE UA  Negative   BILIRUBIN UA  Negative   UROBILINOGEN UA E U /dl 0 2   LEUKOCYTES UA  Negative   SODIUM UR  8     Results from last 7 days   Lab Units 05/20/22  1050   INFLUENZA A PCR  Negative   INFLUENZA B PCR  Negative   RSV PCR  Negative       Results from last 7 days   Lab Units 05/20/22  1504   ETHANOL LVL mg/dL <3       Results from last 7 days   Lab Units 05/20/22  1549   URINE CULTURE  <10,000 cfu/ml        ED Treatment:   Medication Administration from 05/20/2022 0740 to 05/20/2022 1119       Date/Time Order Dose Route Action     05/20/2022 0842 fentanyl citrate (PF) 100 MCG/2ML 25 mcg 25 mcg Intravenous Given     05/20/2022 0841 acetaminophen (TYLENOL) tablet 975 mg 975 mg Oral Given     05/20/2022 0853 magnesium sulfate 2 g/50 mL IVPB (premix) 2 g 2 g Intravenous New Bag     05/20/2022 0928 lidocaine (LIDODERM) 5 % patch 2 patch 2 patch Topical Medication Applied     05/20/2022 1047 potassium chloride 20 mEq IVPB (premix) 20 mEq Intravenous New Bag        Past Medical History:   Diagnosis Date    Alcohol abuse     Traore esophagus     Bowel obstruction (HCC)     Bowel perforation (HCC)     Cardiac disease     Continuous chronic alcoholism ([facility] [address] ) 10/5/2017    [facility]     History of shoulder surgery     Right shoulder    History of transfusion     Hx of cervical spine surgery     Hypertension     Incisional hernia 10/8/2017    MI, old     Mitral regurgitation     Psychiatric disorder     Seizures (HCC)      Present on Admission:   Hyponatremia   Tobacco abuse   Hypokalemia   Thrombocytopenia (HCC)   Hepatic steatosis   Microcytic anemia   Bladder wall thickening   Vitamin D deficiency   Pancreatic pseudocyst   Abnormal EKG      Admitting Diagnosis:     Hypokalemia [E87 6]  Hypomagnesemia [E83 42]  Hyponatremia [E87 1]  Pain [R52]  Closed head injury, initial encounter [S09 90XA]  Syncope and collapse [R55]  Closed wedge compression fracture of T8 vertebra, initial encounter ([facility] ) [S22 060A]  Closed fracture of multiple ribs of right side, initial encounter [S22 41XA]    Age/Sex: 55 y o  male    Scheduled Medications:    baricitinib, 4 mg, Oral, Q24H  calcium carbonate, 1 tablet, Oral, Daily With Breakfast  cholecalciferol, 2,000 Units, Oral, Daily  cyanocobalamin, 100 mcg, Oral, Daily  dexamethasone, 6 mg, Intravenous, T21S  folic acid 1 mg, thiamine 100 mg in 0 9% sodium chloride 100 mL IVPB, , Intravenous, Daily  heparin (porcine), 5,000 Units, Subcutaneous, Q8H KIMBERLY  levETIRAcetam, 1,000 mg, Oral, Q12H [address]  multivitamin-minerals, 1 tablet, Oral, Daily  pancrelipase (Lip-Prot-Amyl), 6,000 Units, Oral, TID With Meals  pantoprazole, 40 mg, Oral, Early Morning  remdesivir, 100 mg, Intravenous, Q24H      Continuous IV Infusions:     PRN Meds:  albuterol, 2 5 mg, Nebulization, Q6H PRN  docusate sodium, 100 mg, Oral, BID PRN  HYDROmorphone, 1 mg, Intravenous, Q4H PRN   Or  oxyCODONE, 10 mg, Oral, Q4H PRN  ondansetron, 4 mg, Intravenous, Q6H PRN        IP CONSULT TO NEPHROLOGY  IP CONSULT TO ALCOHOL BRIEF INTERVENTION TRAUMA  IP CONSULT TO PULMONOLOGY    Network Utilization Review Department  ATTENTION: Please call with any questions or concerns to [phone] and carefully listen to the prompts so that you are directed to the right person   All voicemails are confidential   Rajat Mathew all requests for admission clinical reviews, approved or denied determinations and any other requests to dedicated fax number below belonging to the campus where the patient is receiving treatment   List of dedicated fax numbers for the Facilities:  [address] [place] [address] DENIALS (Administrative/Medical Necessity) [phone]   [address]  (Maternity/NICU/Pediatrics) [phone] [address]  [address] [facility] [place] [address] [id number] [address] [facility] [facility] [facility] [phone]

## 2022-05-23 NOTE — PROGRESS NOTES
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 also secondary to chronic alcohol abuse  · Also with a component of hypovolemic hyponatremia with nausea and a decreased PO intake over the last few days  · Received NSS IV fluids with 20 mEq KCl at 150 ml/hr for a total of 1000 ml on 05/20/2022  · Initiated a 1500 ml/24 hour fluid restriction on 05/21/2022  · The hyponatremia is improving with the fluid restriction  · The patient was seen in consultation by Nephrology, will follow recommendations   · Follow the sodium level      Results from last 7 days   Lab Units 05/23/22  0507 05/22/22  0423 05/21/22  2022   SODIUM mmol/L 125* 123* 122*   POTASSIUM mmol/L 3 5 3 6 4 2   CHLORIDE mmol/L 88* 88* 87*   CO2 mmol/L 28 28 25   BUN mg/dL 8 9 8   CREATININE mg/dL 0 69 0 72 0 85   CALCIUM mg/dL 7 3* 7 3* 7 6*      Latest Reference Range & Units 05/21/22 06:22   TSH 3RD GENERATON 0 450 - 4 500 uIU/mL 1 445 [1]   [1] Adult TSH (3rd generation) reference range follows the recommended guidelines of the [employer], January, 2020      Hypomagnesemia  Assessment & Plan  · Patient persistently hypomagnesemic, continue replacement and monitoring    Results from last 7 days   Lab Units 05/23/22  0507 05/22/22  0423 05/21/22 2022   MAGNESIUM mg/dL 1 0* 1 3* 1 8         COVID-19 virus infection  Assessment & Plan  · Initially requiered 3 lpm of continuous supplemental oxygen to maintain oxygen saturations of 90% and above  · Overnight from 05/21/2022-05/22/2022 developed worsening hypoxia and required up to 5 lpm of continuous supplemental oxygen to maintain oxygen saturation levels of 90% and above  · Changed to the "Moderate" COVID-19 treatment algorithm on 05/22/2022  · Unfortunately patient was refusing supplemental oxygen, currently oxygen saturations lingering around 90% on room air  · Received remdesivir 200 mg IV x 1 dose on 05/20/2022 and continue remdesivir 100 mg IV every 24 hours (day #3 of the treatment course)  · Was treated with dexamethasone 6 mg IV every 24 hours (day #3) - now change to weight based per pulmonology  · Initiated baricitinib 4 mg PO Qdaily (day #1) on 05/22/2022, continue  · The procalcitonin level was within normal limits x 2 sets  · Respiratory protocol  · Incentive spirometry  · Initiated on Lovenox for anticoagulation with no evidence of bleeding, improving thrombocytopenia - monitor CBC closely  · Utilize heparin 5000 units SQ every 8 hours for now  · Consulted Pulmonology  · Venous duplex, V/Q scan pending  · PT/OT    Elevated d-dimer  Assessment & Plan  · In the setting of the COVID-19 virus infection  · Check a venous duplex of the bilateral lower extremities  · Check a V/Q lung scan in place of a CTA of the chest with the IV contrast dye shortage  · Initiated on weight based Lovenox in setting of COVID infection     Latest Reference Range & Units 05/21/22 06:22   D-Dimer, Quant <0 50 ug/ml FEU 1 37 (H) [1]   (H): Data is abnormally high  [1] Reference and upper limits to exclude DVT and PE are the same  Do not use to exclude if clinical symptoms are present      Seizure disorder [facility])  Assessment & Plan  · Had 5-6 episodes at home over the last few weeks, which he feels may represent seizure activity  · Continue PO keppra  · Monitor for any seizure activity    Rib fractures  Assessment & Plan  · Right-sided rib fractures of the 9th rib and 10th rib  · Rib fracture protocol  · Pain control    CT scan of the chest/abdomen/pelvis (05/20/2022): Several healed right-sided rib fractures  Recent appearing fractures of the posterior right 9th and 10th ribs at their costovertebral junctions  Alcohol use  Assessment & Plan  · Monitor for signs of delirium tremens  · Utilize the CIWA protocol  · Utilize folic acid 1 mg IV Qdaily and thiamine 100 mg IV Qdaily  · Daily PO multivitamin  · Alcohol cessation counseling    Pancreatic pseudocyst  Assessment & Plan  · Outpatient surveillance imaging with Gastroenterology  · Check a CA 19-9 level    CT scan of the chest/abdomen/pelvis (05/20/2022):  PANCREAS:  Limited evaluation without IV contrast   Calcifications in the pancreatic head and proximal body, indicative of chronic pancreatitis  3 0 x 3 2 cm cyst in the pancreatic head, measuring 3 4 x 3 9 cm on the CT from 10/30/2021      Thrombocytopenia ([address] )  Assessment & Plan  · Likely due to liver disease  · Improved  · Follow the platelet count  · Initiated on Lovenox in the setting of elevated D-dimer, COVID infection    Results from last 7 days   Lab Units 05/23/22  0507 05/22/22  0423 05/21/22 2022 05/21/22  0622   WBC Thousand/uL 8 46 6 73  --  4 63   HEMOGLOBIN g/dL 7 2* 7 3* 7 7* 7 1*   HEMATOCRIT % 23 1* 24 2* 25 0* 22 8*   PLATELETS Thousands/uL 119* 95*  --  79*         Microcytic anemia  Assessment & Plan  · Check the patient's stool for occult blood x 3 specimens  · Follow the CBC  · Transfuse for a hemoglobin less than 7 g/dl    Results from last 7 days   Lab Units 05/23/22  0507 05/22/22  0423 05/21/22 2022 05/21/22  0622   WBC Thousand/uL 8 46 6 73  --  4 63   HEMOGLOBIN g/dL 7 2* 7 3* 7 7* 7 1*   HEMATOCRIT % 23 1* 24 2* 25 0* 22 8*   PLATELETS Thousands/uL 119* 95*  --  79*      Latest Reference Range & Units 05/21/22 06:22   Iron 65 - 175 ug/dL 38 (L) [1]   Ferritin 8 - 388 ng/mL 301   Iron Saturation 20 - 50 % 13 (L)   TIBC 250 - 450 ug/dL 292   Folate 3 1 - 17 5 ng/mL 10 8 [2]   (L): Data is abnormally low  [1] Patients treated with metal-binding drugs (ie  Deferoxamine) may have depressed iron values  [2] Slightly Hemolyzed; Results May be Affected     Latest Reference Range & Units 22 06:22   Vitamin B-12 100 - 900 pg/mL 1,251 (H)   (H): Data is abnormally high    Hepatic steatosis  Assessment & Plan  · Due to alcohol abuse  · Avoid all hepatotoxic agents  · Outpatient surveillance imaging with Gastroenterology  · Alcohol cessation strongly recommended        VTE Pharmacologic Prophylaxis: VTE Score: 9 High Risk (Score >/= 5) - Pharmacological DVT Prophylaxis Ordered: enoxaparin (Lovenox)  Sequential Compression Devices Ordered  Patient Centered Rounds: I performed bedside rounds with nursing staff today  Discussions with Specialists or Other Care Team Provider:  Pulmonology    Education and Discussions with Family / Patient: Patient declined call to   Time Spent for Care: 45 minutes  More than 50% of total time spent on counseling and coordination of care as described above  Current Length of Stay: 3 day(s)  Current Patient Status: Inpatient   Certification Statement: The patient will continue to require additional inpatient hospital stay due to Close monitoring  Discharge Plan: Anticipate discharge in 48-72 hrs to home  Code Status: Level 1 - Full Code    Subjective:   Patient seen and examined  He is reporting generalized pain  Denies shortness of breaths and refused supplemental oxygen  Reports improving appetite  No bowel movement since admission    Objective:     Vitals:   Temp (24hrs), Av °F (36 7 °C), Min:97 9 °F (36 6 °C), Max:98 1 °F (36 7 °C)    Temp:  [97 9 °F (36 6 °C)-98 1 °F (36 7 °C)] 97 9 °F (36 6 °C)  HR:  [70-85] 75  Resp:  [16-20] 18  BP: (107-120)/(70-77) 109/71  SpO2:  [89 %-93 %] 90 %  Body mass index is 19 69 kg/m²  Input and Output Summary (last 24 hours):      Intake/Output Summary (Last 24 hours) at 2022 1411  Last data filed at 5/23/2022 1149  Gross per 24 hour   Intake 1380 ml   Output 2200 ml   Net -820 ml       Physical Exam:   Physical Exam  Constitutional:       General: He is not in acute distress  HENT:      Head: Normocephalic and atraumatic  Nose: No congestion  Mouth/Throat:      Pharynx: Oropharynx is clear  Eyes:      Conjunctiva/sclera: Conjunctivae normal    Cardiovascular:      Rate and Rhythm: Normal rate and regular rhythm  Heart sounds: No murmur heard  Pulmonary:      Effort: No respiratory distress  Breath sounds: No wheezing or rales  Abdominal:      General: There is no distension  Tenderness: There is no abdominal tenderness  There is no guarding  Musculoskeletal:      Right lower leg: No edema  Left lower leg: No edema  Skin:     General: Skin is warm and dry  Neurological:      Mental Status: He is oriented to person, place, and time     Psychiatric:         Mood and Affect: Mood normal          Additional Data:     Labs:  Results from last 7 days   Lab Units 05/23/22  0507   WBC Thousand/uL 8 46   HEMOGLOBIN g/dL 7 2*   HEMATOCRIT % 23 1*   PLATELETS Thousands/uL 119*   NEUTROS PCT % 84*   LYMPHS PCT % 9*   MONOS PCT % 6   EOS PCT % 0     Results from last 7 days   Lab Units 05/23/22  0507   SODIUM mmol/L 125*   POTASSIUM mmol/L 3 5   CHLORIDE mmol/L 88*   CO2 mmol/L 28   BUN mg/dL 8   CREATININE mg/dL 0 69   ANION GAP mmol/L 9   CALCIUM mg/dL 7 3*   ALBUMIN g/dL 2 5*   TOTAL BILIRUBIN mg/dL 1 00   ALK PHOS U/L 126*   ALT U/L 32   AST U/L 58*   GLUCOSE RANDOM mg/dL 135     Results from last 7 days   Lab Units 05/21/22  0622   INR  1 14             Results from last 7 days   Lab Units 05/23/22  0507 05/22/22  0423 05/21/22  0622   LACTIC ACID mmol/L  --   --  1 0   PROCALCITONIN ng/ml 0 09 0 16 0 15       Lines/Drains:  Invasive Devices  Report    Peripheral Intravenous Line  Duration           Peripheral IV 05/20/22 Left;Ventral (anterior) Forearm 2 days Telemetry:  Telemetry Orders (From admission, onward)             48 Hour Telemetry Monitoring  Continuous x 48 hours        References:    Telemetry Guidelines   Question:  Reason for 48 Hour Telemetry  Answer:  Arrhythmias Requiring Medical Therapy (eg  SVT, Vtach/fib, Bradycardia, Uncontrolled A-fib)                 Telemetry Reviewed: Normal Sinus Rhythm  Indication for Continued Telemetry Use: Metabolic/electrolyte disturbance with high probability of dysrhythmia             Imaging: Reviewed radiology reports from this admission including: ultrasound(s) and Personally reviewed the following imaging: ultrasound(s), will review VQ scan     Recent Cultures (last 7 days):   Results from last 7 days   Lab Units 05/20/22  1549   URINE CULTURE  <10,000 cfu/ml        Last 24 Hours Medication List:   Current Facility-Administered Medications   Medication Dose Route Frequency Provider Last Rate    albuterol  2 5 mg Nebulization Q6H PRN Orr Tatianayamel Meansr, DO      baricitinib  4 mg Oral Q24H [facility]      calcium carbonate  1 tablet Oral Daily With Breakfast Patricio Salinas, DO      cholecalciferol  2,000 Units Oral Daily [facility], DO      cyanocobalamin  100 mcg Oral Daily [facility] Brad, DO      docusate sodium  100 mg Oral BID PRN [facility]      enoxaparin  1 mg/kg Subcutaneous Q12H [facility] Jamshid Lester PA-C      folic acid 1 mg, thiamine 100 mg in 0 9% sodium chloride 100 mL IVPB   Intravenous Daily Orr Tatiana Brad, DO      HYDROmorphone  1 mg Intravenous Q4H PRN Patricio Tatianayamel Salinas, DO      Or    oxyCODONE  10 mg Oral Q4H PRN [facility], [facility]      ipratropium  0 5 mg Nebulization TID Jamshid Lester PA-C      levalbuterol  1 25 mg Nebulization TID Jamshid Lester PA-C      levETIRAcetam  1,000 mg Oral Q12H [facility]      magnesium sulfate  2 g Intravenous Once Hilaria Harvey PA-C      magnesium sulfate 2 g Intravenous Once Gabriela Zuniga MD      methylPREDNISolone sodium succinate  40 mg Intravenous Q12H [address] Jamshid Lester PA-C      multivitamin-minerals  1 tablet Oral Daily Akira Parrish Line, DO      ondansetron  4 mg Intravenous Q6H PRN Akira Gonzalez Brad, DO      pancrelipase (Lip-Prot-Amyl)  6,000 Units Oral TID With Meals Marrachid Gonzalez Brad, DO      pantoprazole  40 mg Oral Early Morning Marrachid Gonzalez Brad, DO      polyethylene glycol  17 g Oral Daily PRN Gabriela Zuniga MD      potassium phosphate  12 mmol Intravenous Once Stephon Isidro LiscoJJ      remdesivir  100 mg Intravenous Q24H Chava Chandler DO          Today, Patient Was Seen By: Jared Rizvi MD    **Please Note: This note may have been constructed using a voice recognition system  **

## 2022-05-24 ENCOUNTER — APPOINTMENT (OUTPATIENT)
Dept: PHYSICAL THERAPY | Facility: CLINIC | Age: 57
End: 2022-05-24
Payer: COMMERCIAL

## 2022-05-24 PROBLEM — E27.49 CORTISOL DEFICIENCY (HCC): Status: ACTIVE | Noted: 2022-05-24

## 2022-05-24 PROBLEM — S42.009A CLAVICULAR FRACTURE: Status: ACTIVE | Noted: 2022-05-24

## 2022-05-24 LAB
ALBUMIN SERPL BCP-MCNC: 2.2 G/DL (ref 3.5–5)
ALP SERPL-CCNC: 121 U/L (ref 46–116)
ALT SERPL W P-5'-P-CCNC: 30 U/L (ref 12–78)
ANION GAP SERPL CALCULATED.3IONS-SCNC: 6 MMOL/L (ref 4–13)
AST SERPL W P-5'-P-CCNC: 47 U/L (ref 5–45)
BASOPHILS # BLD AUTO: 0 THOUSANDS/ΜL (ref 0–0.1)
BASOPHILS NFR BLD AUTO: 0 % (ref 0–1)
BILIRUB SERPL-MCNC: 0.82 MG/DL (ref 0.2–1)
BUN SERPL-MCNC: 11 MG/DL (ref 5–25)
CALCIUM ALBUM COR SERPL-MCNC: 8.2 MG/DL (ref 8.3–10.1)
CALCIUM SERPL-MCNC: 6.8 MG/DL (ref 8.3–10.1)
CANCER AG19-9 SERPL-ACNC: <2 U/ML (ref 0–35)
CHLORIDE SERPL-SCNC: 87 MMOL/L (ref 100–108)
CO2 SERPL-SCNC: 30 MMOL/L (ref 21–32)
CREAT SERPL-MCNC: 0.82 MG/DL (ref 0.6–1.3)
EOSINOPHIL # BLD AUTO: 0 THOUSAND/ΜL (ref 0–0.61)
EOSINOPHIL NFR BLD AUTO: 0 % (ref 0–6)
ERYTHROCYTE [DISTWIDTH] IN BLOOD BY AUTOMATED COUNT: 26.6 % (ref 11.6–15.1)
GFR SERPL CREATININE-BSD FRML MDRD: 99 ML/MIN/1.73SQ M
GLUCOSE SERPL-MCNC: 127 MG/DL (ref 65–140)
HCT VFR BLD AUTO: 22.7 % (ref 36.5–49.3)
HGB BLD-MCNC: 7.2 G/DL (ref 12–17)
IMM GRANULOCYTES # BLD AUTO: 0.07 THOUSAND/UL (ref 0–0.2)
IMM GRANULOCYTES NFR BLD AUTO: 1 % (ref 0–2)
LYMPHOCYTES # BLD AUTO: 0.55 THOUSANDS/ΜL (ref 0.6–4.47)
LYMPHOCYTES NFR BLD AUTO: 6 % (ref 14–44)
MAGNESIUM SERPL-MCNC: 1.2 MG/DL (ref 1.6–2.6)
MCH RBC QN AUTO: 22.6 PG (ref 26.8–34.3)
MCHC RBC AUTO-ENTMCNC: 31.7 G/DL (ref 31.4–37.4)
MCV RBC AUTO: 71 FL (ref 82–98)
MONOCYTES # BLD AUTO: 0.62 THOUSAND/ΜL (ref 0.17–1.22)
MONOCYTES NFR BLD AUTO: 7 % (ref 4–12)
NEUTROPHILS # BLD AUTO: 7.66 THOUSANDS/ΜL (ref 1.85–7.62)
NEUTS SEG NFR BLD AUTO: 86 % (ref 43–75)
NRBC BLD AUTO-RTO: 0 /100 WBCS
OSMOLALITY UR: 241 MMOL/KG
PHOSPHATE SERPL-MCNC: 3.1 MG/DL (ref 2.7–4.5)
PLATELET # BLD AUTO: 139 THOUSANDS/UL (ref 149–390)
PMV BLD AUTO: 9.9 FL (ref 8.9–12.7)
POTASSIUM SERPL-SCNC: 3.2 MMOL/L (ref 3.5–5.3)
PROT SERPL-MCNC: 5.8 G/DL (ref 6.4–8.2)
RBC # BLD AUTO: 3.19 MILLION/UL (ref 3.88–5.62)
SODIUM 24H UR-SCNC: 47 MOL/L
SODIUM SERPL-SCNC: 123 MMOL/L (ref 136–145)
WBC # BLD AUTO: 8.9 THOUSAND/UL (ref 4.31–10.16)

## 2022-05-24 PROCEDURE — 99232 SBSQ HOSP IP/OBS MODERATE 35: CPT | Performed by: PHYSICIAN ASSISTANT

## 2022-05-24 PROCEDURE — 84100 ASSAY OF PHOSPHORUS: CPT | Performed by: PHYSICIAN ASSISTANT

## 2022-05-24 PROCEDURE — 83735 ASSAY OF MAGNESIUM: CPT | Performed by: PHYSICIAN ASSISTANT

## 2022-05-24 PROCEDURE — 97530 THERAPEUTIC ACTIVITIES: CPT

## 2022-05-24 PROCEDURE — 94760 N-INVAS EAR/PLS OXIMETRY 1: CPT

## 2022-05-24 PROCEDURE — 85025 COMPLETE CBC W/AUTO DIFF WBC: CPT | Performed by: PHYSICIAN ASSISTANT

## 2022-05-24 PROCEDURE — 80053 COMPREHEN METABOLIC PANEL: CPT | Performed by: PHYSICIAN ASSISTANT

## 2022-05-24 RX ORDER — LANOLIN ALCOHOL/MO/W.PET/CERES
100 CREAM (GRAM) TOPICAL DAILY
Status: DISCONTINUED | OUTPATIENT
Start: 2022-05-24 | End: 2022-05-26 | Stop reason: HOSPADM

## 2022-05-24 RX ORDER — HYDROCORTISONE 10 MG/1
50 TABLET ORAL 2 TIMES DAILY
Status: DISCONTINUED | OUTPATIENT
Start: 2022-05-26 | End: 2022-05-26 | Stop reason: HOSPADM

## 2022-05-24 RX ORDER — MAGNESIUM SULFATE HEPTAHYDRATE 40 MG/ML
2 INJECTION, SOLUTION INTRAVENOUS
Status: COMPLETED | OUTPATIENT
Start: 2022-05-24 | End: 2022-05-24

## 2022-05-24 RX ORDER — POTASSIUM CHLORIDE 20 MEQ/1
20 TABLET, EXTENDED RELEASE ORAL 2 TIMES DAILY
Status: COMPLETED | OUTPATIENT
Start: 2022-05-24 | End: 2022-05-24

## 2022-05-24 RX ORDER — ENOXAPARIN SODIUM 100 MG/ML
40 INJECTION SUBCUTANEOUS
Status: DISCONTINUED | OUTPATIENT
Start: 2022-05-25 | End: 2022-05-26 | Stop reason: HOSPADM

## 2022-05-24 RX ORDER — POTASSIUM CHLORIDE 20 MEQ/1
40 TABLET, EXTENDED RELEASE ORAL 2 TIMES DAILY
Status: COMPLETED | OUTPATIENT
Start: 2022-05-24 | End: 2022-05-24

## 2022-05-24 RX ORDER — FOLIC ACID 1 MG/1
1 TABLET ORAL DAILY
Status: DISCONTINUED | OUTPATIENT
Start: 2022-05-24 | End: 2022-05-26 | Stop reason: HOSPADM

## 2022-05-24 RX ADMIN — HYDROMORPHONE HYDROCHLORIDE 1 MG: 1 INJECTION, SOLUTION INTRAMUSCULAR; INTRAVENOUS; SUBCUTANEOUS at 09:07

## 2022-05-24 RX ADMIN — LEVETIRACETAM 1000 MG: 500 TABLET, FILM COATED ORAL at 09:04

## 2022-05-24 RX ADMIN — CHOLECALCIFEROL TAB 25 MCG (1000 UNIT) 2000 UNITS: 25 TAB at 09:04

## 2022-05-24 RX ADMIN — POTASSIUM CHLORIDE 40 MEQ: 20 TABLET, EXTENDED RELEASE ORAL at 10:45

## 2022-05-24 RX ADMIN — HYDROCORTISONE SODIUM SUCCINATE 100 MG: 100 INJECTION, POWDER, FOR SOLUTION INTRAMUSCULAR; INTRAVENOUS at 21:28

## 2022-05-24 RX ADMIN — PANCRELIPASE 6000 UNITS: 30000; 6000; 19000 CAPSULE, DELAYED RELEASE PELLETS ORAL at 09:05

## 2022-05-24 RX ADMIN — FOLIC ACID 1 MG: 1 TABLET ORAL at 15:17

## 2022-05-24 RX ADMIN — POTASSIUM CHLORIDE 40 MEQ: 20 TABLET, EXTENDED RELEASE ORAL at 09:05

## 2022-05-24 RX ADMIN — HYDROMORPHONE HYDROCHLORIDE 1 MG: 1 INJECTION, SOLUTION INTRAMUSCULAR; INTRAVENOUS; SUBCUTANEOUS at 19:18

## 2022-05-24 RX ADMIN — FOLIC ACID: 5 INJECTION, SOLUTION INTRAMUSCULAR; INTRAVENOUS; SUBCUTANEOUS at 09:06

## 2022-05-24 RX ADMIN — CALCIUM 1 TABLET: 500 TABLET ORAL at 09:06

## 2022-05-24 RX ADMIN — REMDESIVIR 100 MG: 100 INJECTION, POWDER, LYOPHILIZED, FOR SOLUTION INTRAVENOUS at 13:55

## 2022-05-24 RX ADMIN — HYDROMORPHONE HYDROCHLORIDE 1 MG: 1 INJECTION, SOLUTION INTRAMUSCULAR; INTRAVENOUS; SUBCUTANEOUS at 14:05

## 2022-05-24 RX ADMIN — MAGNESIUM SULFATE HEPTAHYDRATE 2 G: 40 INJECTION, SOLUTION INTRAVENOUS at 10:45

## 2022-05-24 RX ADMIN — PANCRELIPASE 6000 UNITS: 30000; 6000; 19000 CAPSULE, DELAYED RELEASE PELLETS ORAL at 15:30

## 2022-05-24 RX ADMIN — PANTOPRAZOLE SODIUM 40 MG: 40 TABLET, DELAYED RELEASE ORAL at 05:11

## 2022-05-24 RX ADMIN — VITAM B12 100 MCG: 100 TAB at 09:05

## 2022-05-24 RX ADMIN — PANCRELIPASE 6000 UNITS: 30000; 6000; 19000 CAPSULE, DELAYED RELEASE PELLETS ORAL at 11:27

## 2022-05-24 RX ADMIN — POTASSIUM CHLORIDE 20 MEQ: 1500 TABLET, EXTENDED RELEASE ORAL at 10:49

## 2022-05-24 RX ADMIN — MAGNESIUM SULFATE HEPTAHYDRATE 2 G: 40 INJECTION, SOLUTION INTRAVENOUS at 09:05

## 2022-05-24 RX ADMIN — MULTIPLE VITAMINS W/ MINERALS TAB 1 TABLET: TAB at 09:05

## 2022-05-24 RX ADMIN — HYDROCORTISONE SODIUM SUCCINATE 100 MG: 100 INJECTION, POWDER, FOR SOLUTION INTRAMUSCULAR; INTRAVENOUS at 10:34

## 2022-05-24 RX ADMIN — METHYLPREDNISOLONE SODIUM SUCCINATE 40 MG: 40 INJECTION, POWDER, FOR SOLUTION INTRAMUSCULAR; INTRAVENOUS at 09:05

## 2022-05-24 RX ADMIN — ENOXAPARIN SODIUM 60 MG: 60 INJECTION SUBCUTANEOUS at 09:04

## 2022-05-24 RX ADMIN — LEVETIRACETAM 1000 MG: 500 TABLET, FILM COATED ORAL at 21:28

## 2022-05-24 RX ADMIN — BARICITINIB 4 MG: 2 TABLET, FILM COATED ORAL at 09:05

## 2022-05-24 RX ADMIN — HYDROMORPHONE HYDROCHLORIDE 1 MG: 1 INJECTION, SOLUTION INTRAMUSCULAR; INTRAVENOUS; SUBCUTANEOUS at 02:33

## 2022-05-24 RX ADMIN — POTASSIUM CHLORIDE 20 MEQ: 1500 TABLET, EXTENDED RELEASE ORAL at 15:17

## 2022-05-24 RX ADMIN — HYDROMORPHONE HYDROCHLORIDE 1 MG: 1 INJECTION, SOLUTION INTRAMUSCULAR; INTRAVENOUS; SUBCUTANEOUS at 23:24

## 2022-05-24 RX ADMIN — THIAMINE HCL TAB 100 MG 100 MG: 100 TAB at 15:17

## 2022-05-24 NOTE — ASSESSMENT & PLAN NOTE
· Due the COVID-19 virus infection and rib fractures, with nocturnal hypoxia concerning for sleep apnea  · Continues to require up to 4 L overnight, no oxygen requirements during the day, also has been refusing supplemental oxygen intermittently  · Will need an outpatient sleep study

## 2022-05-24 NOTE — RESPIRATORY THERAPY NOTE
0730 checked on pt for nebulizer tx, pt refused would rather inhaler  Also, upon arrival in room, pt sleeping and pulse ox 79% with a very good pleth, placed pt on 4 l/m to increase pulse ox  Pt may need sleep study or oxygen at  HS

## 2022-05-24 NOTE — PLAN OF CARE
Problem: Nutrition/Hydration-ADULT  Goal: Nutrient/Hydration intake appropriate for improving, restoring or maintaining nutritional needs  Description: Monitor and assess patient's nutrition/hydration status for malnutrition  Collaborate with interdisciplinary team and initiate plan and interventions as ordered  Monitor patient's weight and dietary intake as ordered or per policy  Utilize nutrition screening tool and intervene as necessary  Determine patient's food preferences and provide high-protein, high-caloric foods as appropriate       INTERVENTIONS:  - Monitor oral intake, urinary output, labs, and treatment plans  - Assess nutrition and hydration status and recommend course of action  - Evaluate amount of meals eaten  - Assist patient with eating if necessary   - Allow adequate time for meals  - Recommend/ encourage appropriate diets, oral nutritional supplements, and vitamin/mineral supplements  - Order, calculate, and assess calorie counts as needed  - Recommend, monitor, and adjust tube feedings and TPN/PPN based on assessed needs  - Assess need for intravenous fluids  - Provide specific nutrition/hydration education as appropriate  - Include patient/family/caregiver in decisions related to nutrition  Outcome: Progressing     Problem: MOBILITY - ADULT  Goal: Maintain or return to baseline ADL function  Description: INTERVENTIONS:  -  Assess patient's ability to carry out ADLs; assess patient's baseline for ADL function and identify physical deficits which impact ability to perform ADLs (bathing, care of mouth/teeth, toileting, grooming, dressing, etc )  - Assess/evaluate cause of self-care deficits   - Assess range of motion  - Assess patient's mobility; develop plan if impaired  - Assess patient's need for assistive devices and provide as appropriate  - Encourage maximum independence but intervene and supervise when necessary  - Involve family in performance of ADLs  - Assess for home care needs following discharge   - Consider OT consult to assist with ADL evaluation and planning for discharge  - Provide patient education as appropriate  Outcome: Progressing  Goal: Maintains/Returns to pre admission functional level  Description: INTERVENTIONS:  - Perform BMAT or MOVE assessment daily    - Set and communicate daily mobility goal to care team and patient/family/caregiver  - Collaborate with rehabilitation services on mobility goals if consulted  - Perform Range of Motion 4 times a day  - Reposition patient every 2 hours    - Dangle patient 3 times a day  - Stand patient 3 times a day  - Ambulate patient 3 times a day  - Out of bed to chair 3 times a day   - Out of bed for meals 3 times a day  - Out of bed for toileting  - Record patient progress and toleration of activity level   Outcome: Progressing     Problem: Potential for Falls  Goal: Patient will remain free of falls  Description: INTERVENTIONS:  - Educate patient/family on patient safety including physical limitations  - Instruct patient to call for assistance with activity   - Consult OT/PT to assist with strengthening/mobility   - Keep Call bell within reach  - Keep bed low and locked with side rails adjusted as appropriate  - Keep care items and personal belongings within reach  - Initiate and maintain comfort rounds  - Make Fall Risk Sign visible to staff  - Offer Toileting every 2 Hours, in advance of need  - Initiate/Maintain fall alarm  - Obtain necessary fall risk management equipment: alarm, nonskid socks, yellow bracelet  - Apply yellow socks and bracelet for high fall risk patients  - Consider moving patient to room near nurses station  Outcome: Progressing     Problem: PAIN - ADULT  Goal: Verbalizes/displays adequate comfort level or baseline comfort level  Description: Interventions:  - Encourage patient to monitor pain and request assistance  - Assess pain using appropriate pain scale  - Administer analgesics based on type and severity of pain and evaluate response  - Implement non-pharmacological measures as appropriate and evaluate response  - Consider cultural and social influences on pain and pain management  - Notify physician/advanced practitioner if interventions unsuccessful or patient reports new pain  Outcome: Progressing     Problem: INFECTION - ADULT  Goal: Absence or prevention of progression during hospitalization  Description: INTERVENTIONS:  - Assess and monitor for signs and symptoms of infection  - Monitor lab/diagnostic results  - Monitor all insertion sites, i e  indwelling lines, tubes, and drains  - Monitor endotracheal if appropriate and nasal secretions for changes in amount and color  - Pipestem appropriate cooling/warming therapies per order  - Administer medications as ordered  - Instruct and encourage patient and family to use good hand hygiene technique  - Identify and instruct in appropriate isolation precautions for identified infection/condition  Outcome: Progressing     Problem: SAFETY ADULT  Goal: Maintain or return to baseline ADL function  Description: INTERVENTIONS:  -  Assess patient's ability to carry out ADLs; assess patient's baseline for ADL function and identify physical deficits which impact ability to perform ADLs (bathing, care of mouth/teeth, toileting, grooming, dressing, etc )  - Assess/evaluate cause of self-care deficits   - Assess range of motion  - Assess patient's mobility; develop plan if impaired  - Assess patient's need for assistive devices and provide as appropriate  - Encourage maximum independence but intervene and supervise when necessary  - Involve family in performance of ADLs  - Assess for home care needs following discharge   - Consider OT consult to assist with ADL evaluation and planning for discharge  - Provide patient education as appropriate  Outcome: Progressing  Goal: Maintains/Returns to pre admission functional level  Description: INTERVENTIONS:  - Perform BMAT or MOVE assessment daily    - Set and communicate daily mobility goal to care team and patient/family/caregiver  - Collaborate with rehabilitation services on mobility goals if consulted  - Perform Range of Motion 4 times a day  - Reposition patient every 2 hours    - Dangle patient 3 times a day  - Stand patient 3 times a day  - Ambulate patient 3 times a day  - Out of bed to chair 3 times a day   - Out of bed for meals 3 times a day  - Out of bed for toileting  - Record patient progress and toleration of activity level   Outcome: Progressing  Goal: Patient will remain free of falls  Description: INTERVENTIONS:  - Educate patient/family on patient safety including physical limitations  - Instruct patient to call for assistance with activity   - Consult OT/PT to assist with strengthening/mobility   - Keep Call bell within reach  - Keep bed low and locked with side rails adjusted as appropriate  - Keep care items and personal belongings within reach  - Initiate and maintain comfort rounds  - Make Fall Risk Sign visible to staff  - Offer Toileting every 2 Hours, in advance of need  - Initiate/Maintain fall alarm  - Obtain necessary fall risk management equipment: alarm, nonskid socks, yellow bracelet  - Apply yellow socks and bracelet for high fall risk patients  - Consider moving patient to room near nurses station  Outcome: Progressing     Problem: DISCHARGE PLANNING  Goal: Discharge to home or other facility with appropriate resources  Description: INTERVENTIONS:  - Identify barriers to discharge w/patient and caregiver  - Arrange for needed discharge resources and transportation as appropriate  - Identify discharge learning needs (meds, wound care, etc )  - Arrange for interpretive services to assist at discharge as needed  - Refer to Case Management Department for coordinating discharge planning if the patient needs post-hospital services based on physician/advanced practitioner order or complex needs related to functional status, cognitive ability, or social support system  Outcome: Progressing     Problem: Knowledge Deficit  Goal: Patient/family/caregiver demonstrates understanding of disease process, treatment plan, medications, and discharge instructions  Description: Complete learning assessment and assess knowledge base    Interventions:  - Provide teaching at level of understanding  - Provide teaching via preferred learning methods  Outcome: Progressing     Problem: RESPIRATORY - ADULT  Goal: Achieves optimal ventilation and oxygenation  Description: INTERVENTIONS:  - Assess for changes in respiratory status  - Assess for changes in mentation and behavior  - Position to facilitate oxygenation and minimize respiratory effort  - Oxygen administered by appropriate delivery if ordered  - Initiate smoking cessation education as indicated  - Encourage broncho-pulmonary hygiene including cough, deep breathe, Incentive Spirometry  - Assess the need for suctioning and aspirate as needed  - Assess and instruct to report SOB or any respiratory difficulty  - Respiratory Therapy support as indicated  Outcome: Progressing     Problem: GENITOURINARY - ADULT  Goal: Maintains or returns to baseline urinary function  Description: INTERVENTIONS:  - Assess urinary function  - Encourage oral fluids to ensure adequate hydration if ordered  - Administer IV fluids as ordered to ensure adequate hydration  - Administer ordered medications as needed  - Offer frequent toileting  - Follow urinary retention protocol if ordered  Outcome: Progressing  Goal: Absence of urinary retention  Description: INTERVENTIONS:  - Assess patients ability to void and empty bladder  - Monitor I/O  - Bladder scan as needed  - Discuss with physician/AP medications to alleviate retention as needed  - Discuss catheterization for long term situations as appropriate  Outcome: Progressing     Problem: METABOLIC, FLUID AND ELECTROLYTES - ADULT  Goal: Electrolytes maintained within normal limits  Description: INTERVENTIONS:  - Monitor labs and assess patient for signs and symptoms of electrolyte imbalances  - Administer electrolyte replacement as ordered  - Monitor response to electrolyte replacements, including repeat lab results as appropriate  - Instruct patient on fluid and nutrition as appropriate  Outcome: Progressing  Goal: Fluid balance maintained  Description: INTERVENTIONS:  - Monitor labs   - Monitor I/O and WT  - Instruct patient on fluid and nutrition as appropriate  - Assess for signs & symptoms of volume excess or deficit  Outcome: Progressing     Problem: MUSCULOSKELETAL - ADULT  Goal: Maintain or return mobility to safest level of function  Description: INTERVENTIONS:  - Assess patient's ability to carry out ADLs; assess patient's baseline for ADL function and identify physical deficits which impact ability to perform ADLs (bathing, care of mouth/teeth, toileting, grooming, dressing, etc )  - Assess/evaluate cause of self-care deficits   - Assess range of motion  - Assess patient's mobility  - Assess patient's need for assistive devices and provide as appropriate  - Encourage maximum independence but intervene and supervise when necessary  - Involve family in performance of ADLs  - Assess for home care needs following discharge   - Consider OT consult to assist with ADL evaluation and planning for discharge  - Provide patient education as appropriate  Outcome: Progressing  Goal: Maintain proper alignment of affected body part  Description: INTERVENTIONS:  - Support, maintain and protect limb and body alignment  - Provide patient/ family with appropriate education  Outcome: Progressing

## 2022-05-24 NOTE — PROGRESS NOTES
Progress Note - Pulmonary   Esther Suarez 55 y o  male MRN: [id number]  Unit/Bed#: 734-09 [id number]    Assessment/Plan:    1  Acute hypoxic respiratory failure   Weaned to 2 L nasal cannula my presence  Keep oxygen saturations above 88%   Pulmonary toilet:  Increase activity as tolerated, out of bed as tolerated, cough and deep breathing as encouraged, incentive spirometry q 1 hour  2  Moderate COVID pneumonia   Complete remdesivir today (day#5/5)   Continue baricitinib 4 mg p o  Daily (day#3/14)   Systemic steroids switched to Solu-Cortef 100 mg IV q 12 hours today per nephrology- agree  Ria Lynch Trend d-dimer/CRP every 2-3 days pending clinical response   3  Elevated D-dimer   V/Q scan intermediate    CTA chest and lower extremity dopplers without VTE    Follow protocol for [facility]- currently on lovenox 1 mg/kg q12hrs   4  Suspected COPD of unknown severity with acute exacerbation    Improving    It is okay to switch to Solu-Cortef 100 mg IV q 12 hours per nephrology recommendations   Continue Xopenex/Atrovent nebs TID    Continue mucinex 600 mg po q12hrs   5  Hyponatremia   · Secondary to adrenal insufficiency   · Continue fluid restriction   · Treatment per nephrology   6  Nicotine dependence    Smoking cessation encouraged    Chief Complaint:    "I'm wearing the oxygen to keep everyone quiet "    Subjective:    Patient was seen and examined today  No overnight events reported per nursing however this morning respiratory therapist walked into the room and noted oxygen saturations were in the high 70s/low 80s  Patient was urged to place supplemental oxygen on which he did reluctantly  During my evaluation seen on 4 L with SpO2 98% and weaned to 2 L  Patient states that his breathing is his normal state  Denies any worsening shortness of breath  Has a chronic cough productive yellow sputum  Reports a little bit of wheezing  No chest pain or palpitations  Denies fevers or chills   Has pain throughout most of his back  Objective:    Vitals: Blood pressure 120/80, pulse 73, temperature (!) 97 4 °F (36 3 °C), temperature source Oral, resp  rate 20, height 5' 6" (1 676 m), weight 55 3 kg (122 lb), SpO2 95 %  2L NC,Body mass index is 19 69 kg/m²  Intake/Output Summary (Last 24 hours) at 5/24/2022 1108  Last data filed at 5/24/2022 1041  Gross per 24 hour   Intake 900 ml   Output 2700 ml   Net -1800 ml       Invasive Devices  Report    Peripheral Intravenous Line  Duration           Peripheral IV 05/23/22 Left Antecubital <1 day                Physical Exam:     Physical Exam  Vitals and nursing note reviewed  Constitutional:       General: He is not in acute distress  Appearance: Normal appearance  HENT:      Head: Normocephalic and atraumatic  Nose: Nose normal       Mouth/Throat:      Mouth: Mucous membranes are moist       Pharynx: Oropharynx is clear  Eyes:      Extraocular Movements: Extraocular movements intact  Conjunctiva/sclera: Conjunctivae normal    Cardiovascular:      Rate and Rhythm: Normal rate and regular rhythm  Pulses: Normal pulses  Heart sounds: No murmur heard  Pulmonary:      Effort: Pulmonary effort is normal       Breath sounds: Wheezing (faint, improved expiratory wheeze) present  No rhonchi  Abdominal:      General: Bowel sounds are normal       Palpations: Abdomen is soft  Tenderness: There is no abdominal tenderness  Musculoskeletal:         General: Normal range of motion  Cervical back: Normal range of motion and neck supple  No muscular tenderness  Right lower leg: No edema  Left lower leg: No edema  Skin:     General: Skin is warm and dry  Neurological:      General: No focal deficit present  Mental Status: He is alert  Mental status is at baseline  Psychiatric:         Mood and Affect: Mood normal          Behavior: Behavior normal          Labs: I have personally reviewed pertinent lab results  , ABG: No results found for: PHART, EKO3ROJ, PO2ART, APV4EDP, A2UOXIRO, BEART, SOURCE, BNP: No results found for: BNP, CBC:   Lab Results   Component Value Date    WBC 8 90 05/24/2022    HGB 7 2 (L) 05/24/2022    HCT 22 7 (L) 05/24/2022    MCV 71 (L) 05/24/2022     (L) 05/24/2022    MCH 22 6 (L) 05/24/2022    MCHC 31 7 05/24/2022    RDW 26 6 (H) 05/24/2022    MPV 9 9 05/24/2022    NRBC 0 05/24/2022   , CMP:   Lab Results   Component Value Date    SODIUM 123 (L) 05/24/2022    K 3 2 (L) 05/24/2022    CL 87 (L) 05/24/2022    CO2 30 05/24/2022    BUN 11 05/24/2022    CREATININE 0 82 05/24/2022    CALCIUM 6 8 (L) 05/24/2022    AST 47 (H) 05/24/2022    ALT 30 05/24/2022    ALKPHOS 121 (H) 05/24/2022    EGFR 99 05/24/2022   , PT/INR: No results found for: PT, INR, Troponin: No results found for: TROPONINI    Imaging and other studies: I have personally reviewed pertinent reports  and I have personally reviewed pertinent films in PACS     CTA chest PE study 05/23/2022  No PE  Mild diffuse bilateral peripheral ground-glass opacities  Trace pleural effusions  Acute mildly comminuted fracture of the medial right clavicle  Other osseous abnormalities noted

## 2022-05-24 NOTE — ASSESSMENT & PLAN NOTE
· No evidence of withdrawal, and discontinue CIWA protocol  · Continue folic acid and thiamine supplementation  · Daily PO multivitamin  · Alcohol cessation counseling

## 2022-05-24 NOTE — ASSESSMENT & PLAN NOTE
· Initially requiered 3 lpm of continuous supplemental oxygen to maintain oxygen saturations of 90% and above  · Overnight from 05/21/2022-05/22/2022 developed worsening hypoxia and required up to 5 lpm O2  · Changed to the "Moderate" COVID-19 treatment algorithm on 05/22/2022  · Patient was noted for intermittent refusal of supplemental oxygen, he mostly maintains adequate oxygen saturations on room air but continues to require oxygen overnight  · Completed a 5 day course of Remdesivir, continue baricitinb   · Antibiotics are not indicated with procalcitonin remained in normal x2  · Steroid therapy now changed to Cortef due to cortisol deficiency  · Respiratory protocol  · Incentive spirometry  · D-dimer was elevated, however no evidence of DVT or PE, discontinued weight based Lovenox and proceed with prophylactic dosing  · Pulmonology input appreciated

## 2022-05-24 NOTE — ASSESSMENT & PLAN NOTE
· In the setting of the COVID-19 virus infection  · Venous duplex of the bilateral lower extremities negative for DVT  · V/Q lung scan with intermediate probability for PE, therefore CTA of the chest was obtained and negative for PE  · Change Lovenox back to DVT prophylaxis dose     Latest Reference Range & Units 05/21/22 06:22   D-Dimer, Quant <0 50 ug/ml FEU 1 37 (H) [1]   (H): Data is abnormally high  [1] Reference and upper limits to exclude DVT and PE are the same  Do not use to exclude if clinical symptoms are present

## 2022-05-24 NOTE — PLAN OF CARE
Problem: PHYSICAL THERAPY ADULT  Goal: Performs mobility at highest level of function for planned discharge setting  See evaluation for individualized goals  Description: Treatment/Interventions: Functional transfer training, LE strengthening/ROM, Endurance training, Therapeutic exercise, Bed mobility, Gait training          See flowsheet documentation for full assessment, interventions and recommendations  Outcome: Progressing  Note: Prognosis: Fair  Problem List: Decreased strength, Decreased endurance, Impaired balance, Decreased mobility, Pain  Assessment: Pt  seen for PT treatment session this date with interventions consisting of bed mobility and transfers w/ emphasis on improving pt's mobility  Pt  Requiring min cues for sequence and safety  In comparison to previous session, Pt  With improvements in activity tolerance  Pt is in need of continued activity in PT to improve strength balance endurance mobility transfers and ambulation with return to maximize LOF  From PT/mobility standpoint, recommendation at time of d/c would be post acute rehab in order to promote return to PLOF and independence  The patient's AM-PAC Basic Mobility Inpatient Short Form Raw Score is 11  A Raw score of less than or equal to 16 suggests the patient may benefit from discharge to post-acute rehabilitation services  Please also refer to physical therapy recommendation for safe DC planning  PT Discharge Recommendation: Post acute rehabilitation services          See flowsheet documentation for full assessment

## 2022-05-24 NOTE — PROGRESS NOTES
Progress Note - Nephrology   Maris Whitney 55 y o  male MRN: [id number]  Unit/Bed#: 914-85 Encounter: [id number]    Assessment and Plan    1  Hyponatremia  · Serum sodium worsened to 123 millimole per L on 05/24/2022  · Etiology is secondary to adrenal insufficiency  Morning cortisol came back depressed at 2 2 ug/dL  TSH normal  Updated urine osmolality 241  Urine sodium pending  Will initiate hydrocortisone 100 mg IV every 12 hours x2 days, then transition to oral hydrocortisone 50 mg by mouth every 12 hours until he can follow-up with Endocrinology in the outpatient setting  · Patient complains of fluid restriction  Will loosen to 1 5 L daily  2  Adrenal insufficiency  · Will initiate corticosteroid as above  Will need outpatient endocrinology consultation  3  Hypokalemia  · Will provide potassium chloride 20 mEq by mouth twice a day  4  Hypomagnesemia  · Magnesium 1 2 mg/dL on 05/24/2022  Continue to monitor and replete for magnesium goal 2 0 mg/dL and potassium 4 0 millimole per L  to receive magnesium sulfate 4 g IV per hospitalist   Alcoholism likely contributory  5  Hypophosphatemia  · Phosphorus currently at goal   Continue to monitor and replete as needed  6  COVID positive / COPD  · Vaccinated  On room air  On baricitinib and remdesivir  Management per pulmonology and hospitalist colleagues  7  Alcoholism  · Likely contributory to electrolyte derangements      Follow up reason for today's visit:  Hyponatremia / adrenal insufficiency    Hyponatremia    Patient Active Problem List   Diagnosis    Tobacco abuse    Essential hypertension    H/O suicide attempt    H/O cervical spine surgery    Hyponatremia    Dietary folate deficiency anemia    Ventral hernia without obstruction or gangrene    Hepatomegaly    Hepatic steatosis    Hypomagnesemia    Elevated alkaline phosphatase level    Alcoholic hepatitis without ascites    Vitamin D deficiency    Iron deficiency    Urinary retention    Bladder wall thickening    Hypophosphatemia    Generalized weakness    Malnutrition of moderate degree (HCC)    Hypokalemia    Continuous chronic alcoholism (HCC)    Incisional hernia    Hx of seizure disorder    Seizure-like activity (HCC)    Diarrhea    Abnormality of pancreatic duct    Allergic rhinitis    Microcytic anemia    Abdominal wound dehiscence    Cervical disc disorder with myelopathy    Cervical spinal stenosis    Depression    Left foot drop    Lumbar radiculopathy    Neuropathy involving both lower extremities    Peripheral neuropathy    T6 vertebral fracture (HCC)    Alcoholic cirrhosis of liver without ascites (HCC)    Thrombocytopenia (HCC)    Closed fracture of left olecranon process, initial encounter    Iron deficiency anemia due to chronic blood loss    Duodenal ulcer    Tubular adenoma    Traore esophagus    Celiac artery stenosis (HCC)    Pancreatic mass    Pancytopenia (HCC)    Constipation    Transaminitis    Lesion of spleen    Left anterior fascicular block    Abnormal EKG    Acute on chronic pancreatitis (HCC)    Pancreatic pseudocyst    COPD (chronic obstructive pulmonary disease) (HCC)    Ileus (HCC)    Ambulatory dysfunction    Current every day smoker    Fall    Hx of duodenal ulcer    Neck pain    Alcohol use    Thoracic compression fracture (HCC)    Aortic ectasia (HCC)    Rib fractures    Seizure disorder (HCC)    Hypoxia    Traore's esophagus    Elevated d-dimer    COVID-19 virus infection    Hypocalcemia    Clavicular fracture         Subjective:   Complains of fluid restriction  Cough  A complete 10 point review of systems was performed and is otherwise negative  Objective:     Vitals: Blood pressure 113/71, pulse 77, temperature 98 1 °F (36 7 °C), temperature source Oral, resp  rate 20, height 5' 6" (1 676 m), weight 55 3 kg (122 lb), SpO2 (!) 88 %  ,Body mass index is 19 69 kg/m²      Weight (last 2 days)     Date/Time Weight    05/23/22 0632 55 3 (122)            Intake/Output Summary (Last 24 hours) at 5/24/2022 0951  Last data filed at 5/24/2022 0926  Gross per 24 hour   Intake 660 ml   Output 2700 ml   Net -2040 ml     I/O last 3 completed shifts: In: 1380 [P O :1380]  Out: 3600 [Urine:3600]         Physical Exam: /71 (BP Location: Left arm)   Pulse 77   Temp 98 1 °F (36 7 °C) (Oral)   Resp 20   Ht 5' 6" (1 676 m)   Wt 55 3 kg (122 lb)   SpO2 (!) 88%   BMI 19 69 kg/m²     General Appearance:    No acute distress  Cooperative  Appears stated age  Head:    Normocephalic  Atraumatic  Normal jaw occlusion  Eyes:    Lids, conjunctiva normal  No scleral icterus  Ears:    Normal external ears  Nose:   Nares normal  No drainage  Mouth:   Lips, tongue normal  Mucosa normal  Phonation normal    Neck:   Supple  Symmetrical    Back:     Symmetric  No CVA tenderness  Lungs:     Normal respiratory effort  Clear to auscultation bilaterally  Cough with blood-streaked sputum  Chest wall:    No tenderness or deformity  Heart:    Regular rate and rhythm  Normal S1 and S2  No murmur  No JVD  No edema  Abdomen:     Soft  Non-tender  Bowel sounds active  Genitourinary:   No Tabares catheter present  Extremities:   Extremities normal  Atraumatic  No cyanosis  Skin:   Warm and dry  No pallor, jaundice, rash, ecchymoses  Neurologic:   Alert and oriented to person, place, time  No focal deficit  Lab, Imaging and other studies: I have personally reviewed pertinent labs    CBC:   Lab Results   Component Value Date    WBC 8 90 05/24/2022    HGB 7 2 (L) 05/24/2022    HCT 22 7 (L) 05/24/2022    MCV 71 (L) 05/24/2022     (L) 05/24/2022    MCH 22 6 (L) 05/24/2022    MCHC 31 7 05/24/2022    RDW 26 6 (H) 05/24/2022    MPV 9 9 05/24/2022    NRBC 0 05/24/2022     CMP:   Lab Results   Component Value Date    K 3 2 (L) 05/24/2022    CL 87 (L) 05/24/2022    CO2 30 05/24/2022    BUN 11 05/24/2022    CREATININE 0 82 05/24/2022    CALCIUM 6 8 (L) 05/24/2022    AST 47 (H) 05/24/2022    ALT 30 05/24/2022    ALKPHOS 121 (H) 05/24/2022    EGFR 99 05/24/2022         Results from last 7 days   Lab Units 05/24/22  0506 05/23/22  0507 05/22/22  0423   POTASSIUM mmol/L 3 2* 3 5 3 6   CHLORIDE mmol/L 87* 88* 88*   CO2 mmol/L 30 28 28   BUN mg/dL 11 8 9   CREATININE mg/dL 0 82 0 69 0 72   CALCIUM mg/dL 6 8* 7 3* 7 3*   ALK PHOS U/L 121* 126* 109   ALT U/L 30 32 31   AST U/L 47* 58* 48*         Phosphorus:   Lab Results   Component Value Date    PHOS 3 1 05/24/2022     Magnesium:   Lab Results   Component Value Date    MG 1 2 (L) 05/24/2022     Urinalysis: No results found for: COLORU, CLARITYU, SPECGRAV, PHUR, LEUKOCYTESUR, NITRITE, PROTEINUA, GLUCOSEU, KETONESU, BILIRUBINUR, BLOODU  Ionized Calcium: No results found for: CAION  Coagulation: No results found for: PT, INR, APTT  Troponin: No results found for: TROPONINI  ABG: No results found for: PHART, CNR1EHK, PO2ART, TGE0OYT, I9CUGBPO, BEART, SOURCE  Radiology review:     IMAGING  Procedure: XR chest pa & lateral    Result Date: 5/24/2022  Narrative: CHEST INDICATION:   VQ scan  Covid 19 positive COMPARISON:  Chest x-ray May 20 EXAM PERFORMED/VIEWS:  XR CHEST PA & LATERAL Images: 3 FINDINGS: Developing interstitial opacities are demonstrated at the level of the upper lobes  Heart size is normal  Trachea is midline  Patient is status post cervical fusion and right humerus fixation  Old fractures of the thoracic spine are again seen  Impression: Developing groundglass opacities in the upper lobes  The study was marked in [facility] for immediate notification   Workstation performed: [id number]     Procedure: NM lung perfusion imaging    Result Date: 5/23/2022  Narrative: LUNG PERFUSION SCAN INDICATION: Hypoxia, COVID-19, elevated D-dimer COMPARISON:  Chest x-ray dated 5/23/2022 TECHNIQUE:  Multiplanar perfusion imaging was performed following the intravenous administration of 4 2 mCi Tc-99m labeled MAA  No ventilation imaging was performed as per current Covid-19 protocol  FINDINGS:  Perfusion imaging demonstrates nonuniform perfusion involving both lungs  Best seen on anterior, posterior, and right lateral spot images is a moderate-sized wedge-shaped perfusion defect involving the lateral aspect of the right midlung zone corresponding to airspace disease seen on chest x-ray  Best seen on RPO image is a large side segmental perfusion defect involving the posterior right lung base which is likely present on lateral spot image with preserved tracer activity to the margin of the right posterior lung base not excluded on lateral image  There is a wedge-shaped perfusion defect involving the posterior left lung base on left lateral spot image but without definite corresponding abnormality on additional images, therefore most likely artifactual in nature  No additional moderate or large perfusion defects are noted  Impression: Finding is most consistent with intermediate probability for acute pulmonary artery embolus with moderate to large perfusion defects involving the right lung as detailed above  Workstation performed: [id number]     Procedure: CTA chest pe study    Result Date: 5/23/2022  Narrative: CT CHEST WITHOUT IV CONTRAST INDICATION:   Pulmonary embolism (PE) suspected, positive D-dimer COVID positive, acute hypoxia, indeterminate V/Q scan, rule out PE  Patient has confirmed COVID-19  Positive on 5/20/2022  Recent fall  COMPARISON:  Chest CT from 5/20/2022 and 10/30/2021, chest radiograph from 5/23/2022  TECHNIQUE: Chest CT without intravenous contrast   Axial, sagittal, coronal 2D reformats and coronal MIPS from source data  Radiation dose length product (DLP):  361 06 mGy-cm   Radiation dose exposure minimized using iterative reconstruction and automated exposure control  FINDINGS: LUNGS:  Moderate bilateral peripheral groundglass opacity   AIRWAYS: No significant filling defects  PLEURA:  Trace effusions  HEART/GREAT VESSELS:  Normal heart size  Moderate coronary artery calcification indicating atherosclerotic heart disease  Calcification of the aortic valve leaflets which can contribute to aortic stenosis  MEDIASTINUM AND MANE:  Unremarkable  CHEST WALL AND LOWER NECK: Unremarkable  UPPER ABDOMEN:  Redemonstration of 3 7 cm pseudocyst in the pancreatic head with surrounding calcification, present since at least November 2020  Cholecystectomy  Mild fullness of the right renal collecting system OSSEOUS STRUCTURES: Acute fracture of the medial right clavicle  Mild degenerative disease in the spine with mild kyphosis  Cervicothoracic fusion  ORIF of the right humerus  Old healed spinous process fractures in the upper and mid thoracic spine  Healed bilateral rib fractures  Redemonstration of multiple compression deformities in the upper and mid thoracic spine  Impression: No pulmonary embolus  Acute mildly comminuted fracture of the medial right clavicle  Mild diffuse bilateral peripheral groundglass opacity due to Covid-19  New trace pleural effusions  This study was marked for significant notification and follow-up  Workstation performed: [id number]     Procedure: VAS lower limb venous duplex study, complete bilateral    Result Date: 5/23/2022  Narrative:  [facility] REPORT CLINICAL: Indications: Elevated d-dimer  Operative History: No cardiovascular surgeries noted  Risk Factors The patient has history of HTN, CAD and smoking (current) >2 ppd  He has no history of DVT  CONCLUSION: Impression: RIGHT LOWER LIMB: No evidence of acute or chronic deep vein thrombosis  No evidence of superficial thrombophlebitis noted  Doppler evaluation shows a normal response to augmentation maneuvers  LEFT LOWER LIMB: No evidence of acute or chronic deep vein thrombosis  No evidence of superficial thrombophlebitis noted   Doppler evaluation shows a normal response to augmentation maneuvers  SIGNATURE: Electronically Signed by: Nena Curtis on 2022-05-23 04:01:11 PM    Procedure: Echo complete w/ contrast if indicated    Result Date: 5/23/2022  Narrative: Rodrigochito Alexander  Left Ventricle: Left ventricular cavity size is normal  Wall thickness is normal  The left ventricular ejection fraction is 50%  Systolic function is low normal    The following segments are hypokinetic: apical anterior, apical septal, apical inferior, apical lateral and apex    All other segments are normal    Aortic Valve: There is mild and There is mild to moderate stenosis  The aortic valve mean gradient is 9 mmHg  The DVI is 0 39    Tricuspid Valve: There is mild regurgitation         Current Facility-Administered Medications   Medication Dose Route Frequency    albuterol inhalation solution 2 5 mg  2 5 mg Nebulization Q6H PRN    baricitinib (OLUMIANT) tablet 4 mg  4 mg Oral Q24H    calcium carbonate (OYSTER SHELL,OSCAL) 500 mg tablet 1 tablet  1 tablet Oral Daily With Breakfast    cholecalciferol (VITAMIN D3) tablet 2,000 Units  2,000 Units Oral Daily    cyanocobalamin (VITAMIN B-12) tablet 100 mcg  100 mcg Oral Daily    docusate sodium (COLACE) capsule 100 mg  100 mg Oral BID PRN    enoxaparin (LOVENOX) subcutaneous injection 60 mg  1 mg/kg Subcutaneous F48V KIMBERLY    folic acid 1 mg, thiamine (VITAMIN B1) 100 mg in sodium chloride 0 9 % 100 mL IV piggyback   Intravenous Daily    [START ON 5/26/2022] hydrocortisone (CORTEF) tablet 50 mg  50 mg Oral BID    hydrocortisone sodium succinate (PF) (Solu-CORTEF) injection 100 mg  100 mg Intravenous Q12H [facility]    HYDROmorphone (DILAUDID) injection 1 mg  1 mg Intravenous Q4H PRN    Or    oxyCODONE (ROXICODONE) immediate release tablet 10 mg  10 mg Oral Q4H PRN    ipratropium (ATROVENT) 0 02 % inhalation solution 0 5 mg  0 5 mg Nebulization TID    levalbuterol (XOPENEX) inhalation solution 1 25 mg  1 25 mg Nebulization TID    levETIRAcetam (KEPPRA) tablet 1,000 mg  1,000 mg Oral Q12H [facility]    magnesium sulfate 2 g/50 mL IVPB (premix) 2 g  2 g Intravenous Q2H    multivitamin-minerals (CENTRUM) tablet 1 tablet  1 tablet Oral Daily    ondansetron (ZOFRAN) injection 4 mg  4 mg Intravenous Q6H PRN    pancrelipase (Lip-Prot-Amyl) (CREON) delayed release capsule 6,000 Units  6,000 Units Oral TID With Meals    pantoprazole (PROTONIX) EC tablet 40 mg  40 mg Oral Early Morning    polyethylene glycol (MIRALAX) packet 17 g  17 g Oral Daily PRN    potassium chloride (K-DUR,KLOR-CON) CR tablet 40 mEq  40 mEq Oral BID    remdesivir (Veklury) 100 mg in sodium chloride 0 9 % 270 mL IVPB  100 mg Intravenous Q24H     Medications Discontinued During This Encounter   Medication Reason    sodium chloride 0 9 % infusion     oxyCODONE (ROXICODONE) IR tablet 5 mg     HYDROmorphone (DILAUDID) injection 0 5 mg     cholecalciferol (VITAMIN D3) tablet 1,000 Units     nicotine (NICODERM CQ) 14 mg/24hr TD 24 hr patch 1 patch     dexamethasone (DECADRON) injection 6 mg     heparin (porcine) subcutaneous injection 5,000 Units     methylPREDNISolone sodium succinate (Solu-MEDROL) injection 40 mg        Ana Mejia PA-C    Portions of the record may have been created with voice recognition software  Occasional wrong word or "sound a like" substitutions may have occurred due to the inherent limitations of voice recognition software  Read the chart carefully and recognize, using context, where substitutions have occurred

## 2022-05-24 NOTE — ASSESSMENT & PLAN NOTE
· Symptomatic hyponatremia with lightheadedness, dizziness, a visual disturbance, nausea, and gait instability resulting in multiple falls  · Chronic hyponatremia with a baseline sodium level of 129-130 mmol/L  · Improved after initiation of Cortef with sodium of 128 mmol/L this morning  · Appears multifactorial underlying cortisol deficiency   · Continue Cortef and fluid restriction    Results from last 7 days   Lab Units 05/24/22  0506 05/23/22  0507 05/22/22  0423   SODIUM mmol/L 123* 125* 123*   POTASSIUM mmol/L 3 2* 3 5 3 6   CHLORIDE mmol/L 87* 88* 88*   CO2 mmol/L 30 28 28   BUN mg/dL 11 8 9   CREATININE mg/dL 0 82 0 69 0 72   CALCIUM mg/dL 6 8* 7 3* 7 3*      Latest Reference Range & Units 05/21/22 06:22   TSH 3RD GENERATON 0 450 - 4 500 uIU/mL 1 445 [1]   [1] Adult TSH (3rd generation) reference range follows the recommended guidelines of the [employer], January, 2020

## 2022-05-24 NOTE — PLAN OF CARE
Problem: Nutrition/Hydration-ADULT  Goal: Nutrient/Hydration intake appropriate for improving, restoring or maintaining nutritional needs  Description: Monitor and assess patient's nutrition/hydration status for malnutrition  Collaborate with interdisciplinary team and initiate plan and interventions as ordered  Monitor patient's weight and dietary intake as ordered or per policy  Utilize nutrition screening tool and intervene as necessary  Determine patient's food preferences and provide high-protein, high-caloric foods as appropriate       INTERVENTIONS:  - Monitor oral intake, urinary output, labs, and treatment plans  - Assess nutrition and hydration status and recommend course of action  - Evaluate amount of meals eaten  - Assist patient with eating if necessary   - Allow adequate time for meals  - Recommend/ encourage appropriate diets, oral nutritional supplements, and vitamin/mineral supplements  - Order, calculate, and assess calorie counts as needed  - Recommend, monitor, and adjust tube feedings and TPN/PPN based on assessed needs  - Assess need for intravenous fluids  - Provide specific nutrition/hydration education as appropriate  - Include patient/family/caregiver in decisions related to nutrition  Outcome: Progressing     Problem: MOBILITY - ADULT  Goal: Maintain or return to baseline ADL function  Description: INTERVENTIONS:  -  Assess patient's ability to carry out ADLs; assess patient's baseline for ADL function and identify physical deficits which impact ability to perform ADLs (bathing, care of mouth/teeth, toileting, grooming, dressing, etc )  - Assess/evaluate cause of self-care deficits   - Assess range of motion  - Assess patient's mobility; develop plan if impaired  - Assess patient's need for assistive devices and provide as appropriate  - Encourage maximum independence but intervene and supervise when necessary  - Involve family in performance of ADLs  - Assess for home care needs following discharge   - Consider OT consult to assist with ADL evaluation and planning for discharge  - Provide patient education as appropriate  Outcome: Progressing  Goal: Maintains/Returns to pre admission functional level  Description: INTERVENTIONS:  - Perform BMAT or MOVE assessment daily    - Set and communicate daily mobility goal to care team and patient/family/caregiver  - Collaborate with rehabilitation services on mobility goals if consulted  - Perform Range of Motion 4 times a day  - Reposition patient every 2 hours    - Dangle patient 3 times a day  - Stand patient 3 times a day  - Ambulate patient 3 times a day  - Out of bed to chair 3 times a day   - Out of bed for meals 3 times a day  - Out of bed for toileting  - Record patient progress and toleration of activity level   Outcome: Progressing     Problem: Potential for Falls  Goal: Patient will remain free of falls  Description: INTERVENTIONS:  - Educate patient/family on patient safety including physical limitations  - Instruct patient to call for assistance with activity   - Consult OT/PT to assist with strengthening/mobility   - Keep Call bell within reach  - Keep bed low and locked with side rails adjusted as appropriate  - Keep care items and personal belongings within reach  - Initiate and maintain comfort rounds  - Make Fall Risk Sign visible to staff  - Offer Toileting every 2 Hours, in advance of need  - Initiate/Maintain fall alarm  - Obtain necessary fall risk management equipment: alarm, nonskid socks, yellow bracelet  - Apply yellow socks and bracelet for high fall risk patients  - Consider moving patient to room near nurses station  Outcome: Progressing     Problem: PAIN - ADULT  Goal: Verbalizes/displays adequate comfort level or baseline comfort level  Description: Interventions:  - Encourage patient to monitor pain and request assistance  - Assess pain using appropriate pain scale  - Administer analgesics based on type and severity of pain and evaluate response  - Implement non-pharmacological measures as appropriate and evaluate response  - Consider cultural and social influences on pain and pain management  - Notify physician/advanced practitioner if interventions unsuccessful or patient reports new pain  Outcome: Progressing     Problem: INFECTION - ADULT  Goal: Absence or prevention of progression during hospitalization  Description: INTERVENTIONS:  - Assess and monitor for signs and symptoms of infection  - Monitor lab/diagnostic results  - Monitor all insertion sites, i e  indwelling lines, tubes, and drains  - Monitor endotracheal if appropriate and nasal secretions for changes in amount and color  - [place] appropriate cooling/warming therapies per order  - Administer medications as ordered  - Instruct and encourage patient and family to use good hand hygiene technique  - Identify and instruct in appropriate isolation precautions for identified infection/condition  Outcome: Progressing     Problem: SAFETY ADULT  Goal: Maintain or return to baseline ADL function  Description: INTERVENTIONS:  -  Assess patient's ability to carry out ADLs; assess patient's baseline for ADL function and identify physical deficits which impact ability to perform ADLs (bathing, care of mouth/teeth, toileting, grooming, dressing, etc )  - Assess/evaluate cause of self-care deficits   - Assess range of motion  - Assess patient's mobility; develop plan if impaired  - Assess patient's need for assistive devices and provide as appropriate  - Encourage maximum independence but intervene and supervise when necessary  - Involve family in performance of ADLs  - Assess for home care needs following discharge   - Consider OT consult to assist with ADL evaluation and planning for discharge  - Provide patient education as appropriate  Outcome: Progressing  Goal: Maintains/Returns to pre admission functional level  Description: INTERVENTIONS:  - Perform BMAT or MOVE assessment daily    - Set and communicate daily mobility goal to care team and patient/family/caregiver  - Collaborate with rehabilitation services on mobility goals if consulted  - Perform Range of Motion 4 times a day  - Reposition patient every 2 hours    - Dangle patient 3 times a day  - Stand patient 3 times a day  - Ambulate patient 3 times a day  - Out of bed to chair 3 times a day   - Out of bed for meals 3 times a day  - Out of bed for toileting  - Record patient progress and toleration of activity level   Outcome: Progressing  Goal: Patient will remain free of falls  Description: INTERVENTIONS:  - Educate patient/family on patient safety including physical limitations  - Instruct patient to call for assistance with activity   - Consult OT/PT to assist with strengthening/mobility   - Keep Call bell within reach  - Keep bed low and locked with side rails adjusted as appropriate  - Keep care items and personal belongings within reach  - Initiate and maintain comfort rounds  - Make Fall Risk Sign visible to staff  - Offer Toileting every 2 Hours, in advance of need  - Initiate/Maintain fall alarm  - Obtain necessary fall risk management equipment: alarm, nonskid socks, yellow bracelet  - Apply yellow socks and bracelet for high fall risk patients  - Consider moving patient to room near nurses station  Outcome: Progressing     Problem: DISCHARGE PLANNING  Goal: Discharge to home or other facility with appropriate resources  Description: INTERVENTIONS:  - Identify barriers to discharge w/patient and caregiver  - Arrange for needed discharge resources and transportation as appropriate  - Identify discharge learning needs (meds, wound care, etc )  - Arrange for interpretive services to assist at discharge as needed  - Refer to Case Management Department for coordinating discharge planning if the patient needs post-hospital services based on physician/advanced practitioner order or complex needs related to functional status, cognitive ability, or social support system  Outcome: Progressing     Problem: Knowledge Deficit  Goal: Patient/family/caregiver demonstrates understanding of disease process, treatment plan, medications, and discharge instructions  Description: Complete learning assessment and assess knowledge base    Interventions:  - Provide teaching at level of understanding  - Provide teaching via preferred learning methods  Outcome: Progressing     Problem: RESPIRATORY - ADULT  Goal: Achieves optimal ventilation and oxygenation  Description: INTERVENTIONS:  - Assess for changes in respiratory status  - Assess for changes in mentation and behavior  - Position to facilitate oxygenation and minimize respiratory effort  - Oxygen administered by appropriate delivery if ordered  - Initiate smoking cessation education as indicated  - Encourage broncho-pulmonary hygiene including cough, deep breathe, Incentive Spirometry  - Assess the need for suctioning and aspirate as needed  - Assess and instruct to report SOB or any respiratory difficulty  - Respiratory Therapy support as indicated  Outcome: Progressing     Problem: GENITOURINARY - ADULT  Goal: Maintains or returns to baseline urinary function  Description: INTERVENTIONS:  - Assess urinary function  - Encourage oral fluids to ensure adequate hydration if ordered  - Administer IV fluids as ordered to ensure adequate hydration  - Administer ordered medications as needed  - Offer frequent toileting  - Follow urinary retention protocol if ordered  Outcome: Progressing  Goal: Absence of urinary retention  Description: INTERVENTIONS:  - Assess patients ability to void and empty bladder  - Monitor I/O  - Bladder scan as needed  - Discuss with physician/AP medications to alleviate retention as needed  - Discuss catheterization for long term situations as appropriate  Outcome: Progressing     Problem: METABOLIC, FLUID AND ELECTROLYTES - ADULT  Goal: Electrolytes maintained within normal limits  Description: INTERVENTIONS:  - Monitor labs and assess patient for signs and symptoms of electrolyte imbalances  - Administer electrolyte replacement as ordered  - Monitor response to electrolyte replacements, including repeat lab results as appropriate  - Instruct patient on fluid and nutrition as appropriate  Outcome: Progressing  Goal: Fluid balance maintained  Description: INTERVENTIONS:  - Monitor labs   - Monitor I/O and WT  - Instruct patient on fluid and nutrition as appropriate  - Assess for signs & symptoms of volume excess or deficit  Outcome: Progressing     Problem: MUSCULOSKELETAL - ADULT  Goal: Maintain or return mobility to safest level of function  Description: INTERVENTIONS:  - Assess patient's ability to carry out ADLs; assess patient's baseline for ADL function and identify physical deficits which impact ability to perform ADLs (bathing, care of mouth/teeth, toileting, grooming, dressing, etc )  - Assess/evaluate cause of self-care deficits   - Assess range of motion  - Assess patient's mobility  - Assess patient's need for assistive devices and provide as appropriate  - Encourage maximum independence but intervene and supervise when necessary  - Involve family in performance of ADLs  - Assess for home care needs following discharge   - Consider OT consult to assist with ADL evaluation and planning for discharge  - Provide patient education as appropriate  Outcome: Progressing  Goal: Maintain proper alignment of affected body part  Description: INTERVENTIONS:  - Support, maintain and protect limb and body alignment  - Provide patient/ family with appropriate education  Outcome: Progressing

## 2022-05-24 NOTE — UTILIZATION REVIEW
Continued Stay Review    Date: 5-24-22                          Current Patient Class: inpatient  Current Level of Care: med surg     HPI:56 y o  male initially admitted on 5-20-22 for hyponatremia due to adrenal insufficiency, covid, right clavicle fracture      Assessment/Plan:     Cortisol low 2 2  Sodium worsened to 123  Urine osmolality 241  Urine sodium in process  Initiate iv hydrocortisone 100 mg q12 hours x 2 days then transition to po  Patient having difficulty with fluid restriction which was loosened today to 1 8 L daily  Mag is low at 1 2   Continue to monitor and replete with iv mag 2 g  Patient remains on xopenex and atrovent tid  Oxygen weaned to room air with sats 88 to 95%  Echo completed 5-23 shows  Ef of 50% and aortic valve stenosis  PT/OT continues to treat functional deficits and recommends inpatient rehab at discharge  Vital Signs:       Date/Time Temp Pulse Resp BP MAP (mmHg) SpO2 O2 Device   05/24/22 10:48:42 97 4 °F   (36 3 °C)   Abnormal  73 20 120/80 93 95 % None (Room air)   05/24/22 07:52:18 98 1 °F   (36 7 °C) 77 20 113/71 85 88 % Abnormal  None (Room air)   05/24/22 02:57:08 98 3 °F   (36 8 °C) 74 18 110/70 83 93 % None (Room air)           Pertinent Labs/Diagnostic Results:     ECHO  5-23-22       Left Ventricle: Left ventricular cavity size is normal  Wall thickness is normal  The left ventricular ejection fraction is 50%  Systolic function is low normal     The following segments are hypokinetic: apical anterior, apical septal, apical inferior, apical lateral and apex    All other segments are normal     Aortic Valve: There is mild and There is mild to moderate stenosis  The aortic valve mean gradient is 9 mmHg  The DVI is 0 39     Tricuspid Valve:  There is mild regurgitation        Results from last 7 days   Lab Units 05/20/22  1050   SARS-COV-2  Positive*     Results from last 7 days   Lab Units 05/24/22  0506 05/23/22  0507 05/22/22  0423 05/21/22 2022 05/21/22  0622   WBC Thousand/uL 8 90 8 46 6 73  --  4 63   HEMOGLOBIN g/dL 7 2* 7 2* 7 3* 7 7* 7 1*   HEMATOCRIT % 22 7* 23 1* 24 2* 25 0* 22 8*   PLATELETS Thousands/uL 139* 119* 95*  --  79*   NEUTROS ABS Thousands/µL 7 66* 7 13 5 51  --  3 55         Results from last 7 days   Lab Units 05/24/22  0506 05/23/22  0507 05/22/22  0423 05/21/22  2022 05/21/22  0622 05/20/22  1504   SODIUM mmol/L 123* 125* 123* 122* 121* 117*   POTASSIUM mmol/L 3 2* 3 5 3 6 4 2 3 3* 2 8*   CHLORIDE mmol/L 87* 88* 88* 87* 84* 81*   CO2 mmol/L 30 28 28 25 28 27   ANION GAP mmol/L 6 9 7 10 9 9   BUN mg/dL 11 8 9 8 8 10   CREATININE mg/dL 0 82 0 69 0 72 0 85 0 77 1 08   EGFR ml/min/1 73sq m 99 106 105 98 102 76   CALCIUM mg/dL 6 8* 7 3* 7 3* 7 6* 7 4* 7 4*   CALCIUM, IONIZED mmol/L  --  1 00* 1 02*  --   --   --    MAGNESIUM mg/dL 1 2* 1 0* 1 3* 1 8 1 5* 2 4   PHOSPHORUS mg/dL 3 1 2 6* 1 6*  --  1 4* 1 5*    < > = values in this interval not displayed       Results from last 7 days   Lab Units 05/24/22  0506 05/23/22  0507 05/22/22  0423 05/21/22  0622 05/20/22  0824   AST U/L 47* 58* 48* 73* 101*   ALT U/L 30 32 31 28 43   ALK PHOS U/L 121* 126* 109 111 120*   TOTAL PROTEIN g/dL 5 8* 6 2* 6 4 6 5 6 9   ALBUMIN g/dL 2 2* 2 5* 2 5* 2 7* 2 9*   TOTAL BILIRUBIN mg/dL 0 82 1 00 0 74 0 87 0 89   BILIRUBIN DIRECT mg/dL  --   --   --   --  0 49*         Results from last 7 days   Lab Units 05/24/22  0506 05/23/22  0507 05/22/22  0423 05/21/22  2022 05/21/22  0622 05/20/22  2145 05/20/22  1504 05/20/22  0824   GLUCOSE RANDOM mg/dL 127 135 122 190* 120 166* 143* 91     Results from last 7 days   Lab Units 05/20/22  1300   OSMOLALITY, SERUM mmol/*       Results from last 7 days   Lab Units 05/20/22  0824   PH RODDY  7 445*   PCO2 RODDY mm Hg 42 2   PO2 RODDY mm Hg 32 0*   HCO3 RODDY mmol/L 28 3   BASE EXC RODDY mmol/L 3 9   O2 CONTENT RODDY ml/dL 7 3   O2 HGB, VENOUS % 53 7*         Results from last 7 days   Lab Units 05/21/22  0622   CK TOTAL U/L 104 Results from last 7 days   Lab Units 05/20/22  1300 05/20/22  1050 05/20/22  0824   HS TNI 0HR ng/L  --   --  11   HS TNI 2HR ng/L  --  11  --    HSTNI D2 ng/L  --  0  --    HS TNI 4HR ng/L 10  --   --    HSTNI D4 ng/L -1  --   --      Results from last 7 days   Lab Units 05/23/22  0507 05/22/22  0423 05/21/22  0622   D-DIMER QUANTITATIVE ug/ml FEU 1 73* 1 34* 1 37*     Results from last 7 days   Lab Units 05/21/22  0622 05/20/22  0824   PROTIME seconds 14 0 12 5   INR  1 14 0 98   PTT seconds 34  --      Results from last 7 days   Lab Units 05/21/22  0622 05/20/22  0824   TSH 3RD GENERATON uIU/mL 1 445 2 436     Results from last 7 days   Lab Units 05/23/22  0507 05/22/22  0423 05/21/22  0622   PROCALCITONIN ng/ml 0 09 0 16 0 15     Results from last 7 days   Lab Units 05/21/22  0622   LACTIC ACID mmol/L 1 0             Results from last 7 days   Lab Units 05/20/22  0824   NT-PRO BNP pg/mL 253*     Results from last 7 days   Lab Units 05/21/22  0622   FERRITIN ng/mL 301     Results from last 7 days   Lab Units 05/21/22  0622   HEP B S AG  Non-reactive   HEP C AB  Non-reactive   HEP B C IGM  Non-reactive     Results from last 7 days   Lab Units 05/21/22  0622   LIPASE u/L 211     Results from last 7 days   Lab Units 05/23/22  0507 05/22/22  0423 05/21/22  0622   CRP mg/L 6 1* 6 7* 9 8*         Results from last 7 days   Lab Units 05/23/22  2151 05/21/22 2118 05/20/22  1300   OSMOLALITY, SERUM mmol/KG  --   --  253*   OSMO UR mmol/* 211*  --      Results from last 7 days   Lab Units 05/23/22  2151 05/20/22  1549   CLARITY UA   --  Clear   COLOR UA   --  Yellow   SPEC GRAV UA   --  1 010   PH UA   --  6 0   GLUCOSE UA mg/dl  --  Negative   KETONES UA mg/dl  --  Negative   BLOOD UA   --  Negative   PROTEIN UA mg/dl  --  Negative   NITRITE UA   --  Negative   BILIRUBIN UA   --  Negative   UROBILINOGEN UA E U /dl  --  0 2   LEUKOCYTES UA   --  Negative   SODIUM UR  47 8     Results from last 7 days   Lab Units 05/20/22  1050   INFLUENZA A PCR  Negative   INFLUENZA B PCR  Negative   RSV PCR  Negative             Results from last 7 days   Lab Units 05/20/22  1504   ETHANOL LVL mg/dL <3         Results from last 7 days   Lab Units 05/20/22  1549   URINE CULTURE  <10,000 cfu/ml        Scheduled Medications:    baricitinib, 4 mg, Oral, Q24H  calcium carbonate, 1 tablet, Oral, Daily With Breakfast  cholecalciferol, 2,000 Units, Oral, Daily  cyanocobalamin, 100 mcg, Oral, Daily  [START ON 5/25/2022] enoxaparin, 40 mg, Subcutaneous, F28N KIMBERLY  folic acid, 1 mg, Oral, Daily  [START ON 5/26/2022] hydrocortisone, 50 mg, Oral, BID  hydrocortisone sodium succinate, 100 mg, Intravenous, Q12H KIMBERLY  ipratropium, 0 5 mg, Nebulization, TID  levalbuterol, 1 25 mg, Nebulization, TID  levETIRAcetam, 1,000 mg, Oral, Q12H [facility]  multivitamin-minerals, 1 tablet, Oral, Daily  pancrelipase (Lip-Prot-Amyl), 6,000 Units, Oral, TID With Meals  pantoprazole, 40 mg, Oral, Early Morning  potassium chloride, 20 mEq, Oral, BID  thiamine, 100 mg, Oral, Daily      Continuous IV Infusions:     PRN Meds:  albuterol, 2 5 mg, Nebulization, Q6H PRN  docusate sodium, 100 mg, Oral, BID PRN  HYDROmorphone, 1 mg, Intravenous, Q4H PRN   Or  oxyCODONE, 10 mg, Oral, Q4H PRN  ondansetron, 4 mg, Intravenous, Q6H PRN  polyethylene glycol, 17 g, Oral, Daily PRN        Discharge Plan: to be determined     Network Utilization Review Department  ATTENTION: Please call with any questions or concerns to [phone] and carefully listen to the prompts so that you are directed to the right person  All voicemails are confidential   Taz Ferrara all requests for admission clinical reviews, approved or denied determinations and any other requests to dedicated fax number below belonging to the campus where the patient is receiving treatment   List of dedicated fax numbers for the Facilities:  FACILITY NAME SADIE Polanco DENIALS (Administrative/Medical Necessity) [phone] [address] (Maternity/NICU/Pediatrics) [address] [facility],[address] [facility] 40 125 [facility]  [phone]   Fran Ball 50 [address] [place] [address] [address] [place] [address] [place] Georgi Tavares [address] [facility] [phone]

## 2022-05-24 NOTE — ASSESSMENT & PLAN NOTE
Felt to be etiology of hyponatremia  Initiated on Cortef therapy  Will need referral to endocrinology

## 2022-05-24 NOTE — PHYSICAL THERAPY NOTE
PHYSICAL THERAPY NOTE          Patient Name: Funmilayo Durand  IDSDM'H Date: 5/24/2022 05/24/22 1200   PT Last Visit   PT Visit Date 05/24/22   Note Type   Note Type Treatment   Pain Assessment   Pain Assessment Tool 0-10   Pain Score 10 - Worst Possible Pain   Pain Location/Orientation Location: Back   Restrictions/Precautions   Weight Bearing Precautions Per Order No   Other Precautions   (Airborne/isolation; Contact/isolation; Telemetry; Fall Risk; Pain)   Cognition   Overall Cognitive Status WFL   Following Commands Follows one step commands with increased time or repetition   Subjective   Subjective States "I'm not doing anything until they figure out what is wrong with me  I have to stand to use the urinal "   Bed Mobility   Rolling L 5  Supervision   Additional items Assist x 1;HOB elevated; Increased time required   Supine to Sit 4  Minimal assistance   Additional items Assist x 1;HOB elevated; Bedrails; Increased time required;Verbal cues   Additional Comments Increased time to scoot to EOB  Sat EOB with fair+ sitting balance   Transfers   Sit to Stand 4  Minimal assistance   Additional items Assist x 1;Bedrails; Increased time required;Verbal cues   Stand to Sit 4  Minimal assistance   Additional items Assist x 1;Bedrails; Increased time required;Verbal cues   Additional Comments Pt stood x 3 min to utilize urinal with fair - static standing balance  Refused device, utilized edge of bedside table for support  Ambulation/Elevation   Gait pattern Not tested  (refused trial to ambulate)   Balance   Static Sitting Fair +   Dynamic Sitting Fair +   Static Standing Fair -   Endurance Deficit   Endurance Deficit Yes   Activity Tolerance   Activity Tolerance Patient limited by fatigue;Patient limited by pain   Assessment   Prognosis Fair   Problem List Decreased strength;Decreased endurance; Impaired balance;Decreased mobility;Pain Assessment Pt  seen for PT treatment session this date with interventions consisting of bed mobility and transfers w/ emphasis on improving pt's mobility  Pt  Requiring min cues for sequence and safety  In comparison to previous session, Pt  With improvements in activity tolerance  Pt is in need of continued activity in PT to improve strength balance endurance mobility transfers and ambulation with return to maximize LOF  From PT/mobility standpoint, recommendation at time of d/c would be post acute rehab in order to promote return to PLOF and independence  The patient's AM-PAC Basic Mobility Inpatient Short Form Raw Score is 11  A Raw score of less than or equal to 16 suggests the patient may benefit from discharge to post-acute rehabilitation services  Please also refer to physical therapy recommendation for safe DC planning  Goals   LTG Expiration Date 06/05/22   PT Treatment Day 1   Plan   Treatment/Interventions Functional transfer training;LE strengthening/ROM; Therapeutic exercise; Endurance training;Bed mobility;Gait training   Progress Slow progress, decreased activity tolerance   PT Frequency 3-5x/wk   Recommendation   PT Discharge Recommendation Post acute rehabilitation services   AM-PAC Basic Mobility Inpatient   Turning in Bed Without Bedrails 3   Lying on Back to Sitting on Edge of Flat Bed 2   Moving Bed to Chair 2   Standing Up From Chair 2   Walk in Room 1   Climb 3-5 Stairs 1   Basic Mobility Inpatient Raw Score 11   Basic Mobility Standardized Score 30 25   Highest Level Of Mobility   JH-HLM Goal 4: Move to chair/commode   JH-HLM Achieved 5: Stand (1 or more minutes)   Education   Education Provided Mobility training   Patient Reinforcement needed   End of Consult   Patient Position at End of Consult Supine; All needs within reach   End of Consult Comments discussed POC with PT

## 2022-05-25 ENCOUNTER — DOCUMENTATION (OUTPATIENT)
Dept: CARDIAC SURGERY | Facility: CLINIC | Age: 57
End: 2022-05-25

## 2022-05-25 ENCOUNTER — HOSPITAL ENCOUNTER (OUTPATIENT)
Dept: INFUSION CENTER | Facility: HOSPITAL | Age: 57
Discharge: HOME | End: 2022-05-25

## 2022-05-25 PROBLEM — D64.9 CHRONIC ANEMIA: Status: ACTIVE | Noted: 2022-05-25

## 2022-05-25 LAB
ANION GAP SERPL CALCULATED.3IONS-SCNC: 8 MMOL/L (ref 4–13)
BUN SERPL-MCNC: 15 MG/DL (ref 5–25)
CALCIUM SERPL-MCNC: 7.8 MG/DL (ref 8.3–10.1)
CHLORIDE SERPL-SCNC: 92 MMOL/L (ref 100–108)
CO2 SERPL-SCNC: 28 MMOL/L (ref 21–32)
CREAT SERPL-MCNC: 0.75 MG/DL (ref 0.6–1.3)
EST. AVERAGE GLUCOSE BLD GHB EST-MCNC: 97 MG/DL
GFR SERPL CREATININE-BSD FRML MDRD: 103 ML/MIN/1.73SQ M
GLUCOSE SERPL-MCNC: 127 MG/DL (ref 65–140)
HBA1C MFR BLD: 5 %
MAGNESIUM SERPL-MCNC: 1.5 MG/DL (ref 1.6–2.6)
PHOSPHATE SERPL-MCNC: 2.5 MG/DL (ref 2.7–4.5)
POTASSIUM SERPL-SCNC: 4 MMOL/L (ref 3.5–5.3)
SODIUM SERPL-SCNC: 128 MMOL/L (ref 136–145)

## 2022-05-25 PROCEDURE — 99232 SBSQ HOSP IP/OBS MODERATE 35: CPT | Performed by: INTERNAL MEDICINE

## 2022-05-25 PROCEDURE — 80048 BASIC METABOLIC PNL TOTAL CA: CPT | Performed by: PHYSICIAN ASSISTANT

## 2022-05-25 PROCEDURE — 99232 SBSQ HOSP IP/OBS MODERATE 35: CPT | Performed by: PHYSICIAN ASSISTANT

## 2022-05-25 PROCEDURE — 84100 ASSAY OF PHOSPHORUS: CPT | Performed by: PHYSICIAN ASSISTANT

## 2022-05-25 PROCEDURE — 94760 N-INVAS EAR/PLS OXIMETRY 1: CPT

## 2022-05-25 PROCEDURE — 99232 SBSQ HOSP IP/OBS MODERATE 35: CPT | Performed by: FAMILY MEDICINE

## 2022-05-25 PROCEDURE — 97110 THERAPEUTIC EXERCISES: CPT

## 2022-05-25 PROCEDURE — 83735 ASSAY OF MAGNESIUM: CPT | Performed by: PHYSICIAN ASSISTANT

## 2022-05-25 RX ORDER — MAGNESIUM SULFATE HEPTAHYDRATE 40 MG/ML
4 INJECTION, SOLUTION INTRAVENOUS
Status: DISCONTINUED | OUTPATIENT
Start: 2022-05-25 | End: 2022-05-25 | Stop reason: RX

## 2022-05-25 RX ORDER — MAGNESIUM SULFATE HEPTAHYDRATE 40 MG/ML
2 INJECTION, SOLUTION INTRAVENOUS
Status: COMPLETED | OUTPATIENT
Start: 2022-05-25 | End: 2022-05-25

## 2022-05-25 RX ORDER — HYDROXYZINE HYDROCHLORIDE 25 MG/1
25 TABLET, FILM COATED ORAL EVERY 6 HOURS PRN
Status: DISCONTINUED | OUTPATIENT
Start: 2022-05-25 | End: 2022-05-26 | Stop reason: HOSPADM

## 2022-05-25 RX ADMIN — ENOXAPARIN SODIUM 40 MG: 40 INJECTION SUBCUTANEOUS at 09:00

## 2022-05-25 RX ADMIN — CALCIUM 1 TABLET: 500 TABLET ORAL at 09:02

## 2022-05-25 RX ADMIN — PANCRELIPASE 6000 UNITS: 30000; 6000; 19000 CAPSULE, DELAYED RELEASE PELLETS ORAL at 09:02

## 2022-05-25 RX ADMIN — LEVETIRACETAM 1000 MG: 500 TABLET, FILM COATED ORAL at 09:00

## 2022-05-25 RX ADMIN — HYDROMORPHONE HYDROCHLORIDE 1 MG: 1 INJECTION, SOLUTION INTRAMUSCULAR; INTRAVENOUS; SUBCUTANEOUS at 08:59

## 2022-05-25 RX ADMIN — HYDROMORPHONE HYDROCHLORIDE 1 MG: 1 INJECTION, SOLUTION INTRAMUSCULAR; INTRAVENOUS; SUBCUTANEOUS at 04:24

## 2022-05-25 RX ADMIN — HYDROMORPHONE HYDROCHLORIDE 1 MG: 1 INJECTION, SOLUTION INTRAMUSCULAR; INTRAVENOUS; SUBCUTANEOUS at 19:54

## 2022-05-25 RX ADMIN — PANCRELIPASE 6000 UNITS: 30000; 6000; 19000 CAPSULE, DELAYED RELEASE PELLETS ORAL at 17:12

## 2022-05-25 RX ADMIN — HYDROMORPHONE HYDROCHLORIDE 1 MG: 1 INJECTION, SOLUTION INTRAMUSCULAR; INTRAVENOUS; SUBCUTANEOUS at 13:18

## 2022-05-25 RX ADMIN — HYDROCORTISONE SODIUM SUCCINATE 100 MG: 100 INJECTION, POWDER, FOR SOLUTION INTRAMUSCULAR; INTRAVENOUS at 21:19

## 2022-05-25 RX ADMIN — VITAM B12 100 MCG: 100 TAB at 09:00

## 2022-05-25 RX ADMIN — HYDROXYZINE HYDROCHLORIDE 25 MG: 25 TABLET ORAL at 13:35

## 2022-05-25 RX ADMIN — MAGNESIUM SULFATE HEPTAHYDRATE 2 G: 40 INJECTION, SOLUTION INTRAVENOUS at 13:18

## 2022-05-25 RX ADMIN — MAGNESIUM SULFATE HEPTAHYDRATE 2 G: 40 INJECTION, SOLUTION INTRAVENOUS at 10:34

## 2022-05-25 RX ADMIN — THIAMINE HCL TAB 100 MG 100 MG: 100 TAB at 09:00

## 2022-05-25 RX ADMIN — LEVETIRACETAM 1000 MG: 500 TABLET, FILM COATED ORAL at 21:19

## 2022-05-25 RX ADMIN — HYDROCORTISONE SODIUM SUCCINATE 100 MG: 100 INJECTION, POWDER, FOR SOLUTION INTRAMUSCULAR; INTRAVENOUS at 09:00

## 2022-05-25 RX ADMIN — MAGNESIUM SULFATE HEPTAHYDRATE 2 G: 40 INJECTION, SOLUTION INTRAVENOUS at 09:03

## 2022-05-25 RX ADMIN — MULTIPLE VITAMINS W/ MINERALS TAB 1 TABLET: TAB at 09:00

## 2022-05-25 RX ADMIN — MAGNESIUM SULFATE HEPTAHYDRATE 2 G: 40 INJECTION, SOLUTION INTRAVENOUS at 11:36

## 2022-05-25 RX ADMIN — CHOLECALCIFEROL TAB 25 MCG (1000 UNIT) 2000 UNITS: 25 TAB at 09:00

## 2022-05-25 RX ADMIN — BARICITINIB 4 MG: 2 TABLET, FILM COATED ORAL at 09:02

## 2022-05-25 RX ADMIN — PANTOPRAZOLE SODIUM 40 MG: 40 TABLET, DELAYED RELEASE ORAL at 05:17

## 2022-05-25 RX ADMIN — FOLIC ACID 1 MG: 1 TABLET ORAL at 09:00

## 2022-05-25 RX ADMIN — PANCRELIPASE 6000 UNITS: 30000; 6000; 19000 CAPSULE, DELAYED RELEASE PELLETS ORAL at 13:19

## 2022-05-25 RX ADMIN — POTASSIUM PHOSPHATE, MONOBASIC AND POTASSIUM PHOSPHATE, DIBASIC 9 MMOL: 224; 236 INJECTION, SOLUTION, CONCENTRATE INTRAVENOUS at 11:36

## 2022-05-25 NOTE — PROGRESS NOTES
[facility] [address]  Progress Note - [facility] 1965, 55 y o  male MRN: [id number]  Unit/Bed#: 843-48 Encounter: [id number]  Primary Care Provider: LORA Abbasi   Date and time admitted to hospital: 5/20/2022  7:43 AM    * Hyponatremia  Assessment & Plan  · Symptomatic hyponatremia with lightheadedness, dizziness, a visual disturbance, nausea, and gait instability resulting in multiple falls  · Chronic hyponatremia with a baseline sodium level of 129-130 mmol/L  · Improved after initiation of Cortef with sodium of 128 mmol/L this morning  · Appears multifactorial underlying cortisol deficiency   · Continue Cortef and fluid restriction    Results from last 7 days   Lab Units 05/24/22  0506 05/23/22  0507 05/22/22  0423   SODIUM mmol/L 123* 125* 123*   POTASSIUM mmol/L 3 2* 3 5 3 6   CHLORIDE mmol/L 87* 88* 88*   CO2 mmol/L 30 28 28   BUN mg/dL 11 8 9   CREATININE mg/dL 0 82 0 69 0 72   CALCIUM mg/dL 6 8* 7 3* 7 3*      Latest Reference Range & Units 05/21/22 06:22   TSH 3RD GENERATON 0 450 - 4 500 uIU/mL 1 445 [1]   [1] Adult TSH (3rd generation) reference range follows the recommended guidelines of the American Thy[employer] Association, January, 2020      Hypomagnesemia  Assessment & Plan  · Patient persistently hypomagnesemic but improved, continue replacement and monitoring    Results from last 7 days   Lab Units 05/25/22  0438 05/24/22  0506 05/23/22  0507   MAGNESIUM mg/dL 1 5* 1 2* 1 0*         Chronic anemia  Assessment & Plan  No evidence of bleeding with hemoglobin stable around 7  Monitor CBC transfuse for hemoglobin less than 7  Continue vitamin U76 and folic acid supplementation    Cortisol deficiency (HCC)  Assessment & Plan  Felt to be etiology of hyponatremia  Initiated on Cortef therapy  Will need referral to endocrinology    Clavicular fracture  Assessment & Plan  Non operative per Orthopedic surgery, continue supportive management, apply matt    Hypocalcemia  Assessment & Plan  · Initiated on PO calcium supplementation treatment  · Follow CMP and ionized calcium level    COVID-19 virus infection  Assessment & Plan  · Initially requiered 3 lpm of continuous supplemental oxygen to maintain oxygen saturations of 90% and above  · Overnight from 05/21/2022-05/22/2022 developed worsening hypoxia and required up to 5 lpm O2  · Changed to the "Moderate" COVID-19 treatment algorithm on 05/22/2022  · Patient was noted for intermittent refusal of supplemental oxygen, he mostly maintains adequate oxygen saturations on room air but continues to require oxygen overnight  · Completed a 5 day course of Remdesivir, continue baricitinb   · Antibiotics are not indicated with procalcitonin remained in normal x2  · Steroid therapy now changed to Cortef due to cortisol deficiency  · Respiratory protocol  · Incentive spirometry  · D-dimer was elevated, however no evidence of DVT or PE, discontinued weight based Lovenox and proceed with prophylactic dosing  · Pulmonology input appreciated    Elevated d-dimer  Assessment & Plan  · In the setting of the COVID-19 virus infection  · Venous duplex of the bilateral lower extremities negative for DVT  · V/Q lung scan with intermediate probability for PE, therefore CTA of the chest was obtained and negative for PE  · Change Lovenox back to DVT prophylaxis dose     Latest Reference Range & Units 05/21/22 06:22   D-Dimer, Quant <0 50 ug/ml FEU 1 37 (H) [1]   (H): Data is abnormally high  [1] Reference and upper limits to exclude DVT and PE are the same  Do not use to exclude if clinical symptoms are present      Hypoxia  Assessment & Plan  · Due the COVID-19 virus infection and rib fractures, with nocturnal hypoxia concerning for sleep apnea  · Continues to require up to 4 L overnight, no oxygen requirements during the day, also has been refusing supplemental oxygen intermittently  · Will need an outpatient sleep study    Seizure disorder [facility])  Assessment & Plan  · Had 5-6 episodes at home over the last few weeks, which he feels may represent seizure activity  · Continue PO keppra  · Monitor for any seizure activity    Thoracic compression fracture [facility])  Assessment & Plan  · Multiple compression fractures  · Needs an outpatient DEXA scan with his PCP  · PT/OT  · Outpatient Orthopedic Spine Surgery evaluation    CT scan of the chest/abdomen/pelvis (05/20/2022): IMPRESSION:     1  Chronic compression fractures of T2, T3, T6 and T7      2   Mild compression fractures of T8 4 and T8, age indeterminant, although developing since 10/30/2021  Alcohol use  Assessment & Plan  · No evidence of withdrawal, and discontinue CIWA protocol  · Continue folic acid and thiamine supplementation  · Daily PO multivitamin  · Alcohol cessation counseling    Pancreatic pseudocyst  Assessment & Plan  · Outpatient surveillance imaging with Gastroenterology  · Check a CA 19-9 level    CT scan of the chest/abdomen/pelvis (05/20/2022):  PANCREAS:  Limited evaluation without IV contrast   Calcifications in the pancreatic head and proximal body, indicative of chronic pancreatitis  3 0 x 3 2 cm cyst in the pancreatic head, measuring 3 4 x 3 9 cm on the CT from 10/30/2021  Thrombocytopenia ([address] )  Assessment & Plan  · Likely due to liver disease  · Improved  · Follow the platelet count  · Continue DVT prophylaxis     Results from last 7 days   Lab Units 05/24/22  0506 05/23/22  0507 05/22/22  0423   WBC Thousand/uL 8 90 8 46 6 73   HEMOGLOBIN g/dL 7 2* 7 2* 7 3*   HEMATOCRIT % 22 7* 23 1* 24 2*   PLATELETS Thousands/uL 139* 119* 95*         Hypokalemia  Assessment & Plan  · Replace and monitor electrolytes     Tobacco abuse  Assessment & Plan  · The patient declined a nicotine patch  · Smoking cessation counseling        VTE Pharmacologic Prophylaxis: VTE Score: 9 High Risk (Score >/= 5) - Pharmacological DVT Prophylaxis Ordered: enoxaparin (Lovenox)   Sequential Compression Devices Ordered  Patient Centered Rounds: I performed bedside rounds with nursing staff today  Discussions with Specialists or Other Care Team Provider:  Pulmonology    Education and Discussions with Family / Patient: Patient  Time Spent for Care: 30 minutes  More than 50% of total time spent on counseling and coordination of care as described above  Current Length of Stay: 5 day(s)  Current Patient Status: Inpatient   Certification Statement: The patient will continue to require additional inpatient hospital stay due to close monitoring   Discharge Plan: Anticipate discharge tomorrow to home with home services  Code Status: Level 1 - Full Code    Subjective:   Patient seen and examined  He is c/o pain from his multiple falls but otherwise voices no new complaints  Objective:     Vitals:   Temp (24hrs), Av 9 °F (36 6 °C), Min:97 5 °F (36 4 °C), Max:98 3 °F (36 8 °C)    Temp:  [97 5 °F (36 4 °C)-98 3 °F (36 8 °C)] 98 3 °F (36 8 °C)  HR:  [70-86] 82  Resp:  [18] 18  BP: (115-119)/(70-97) 119/97  SpO2:  [90 %-95 %] 95 %  Body mass index is 19 69 kg/m²  Input and Output Summary (last 24 hours): Intake/Output Summary (Last 24 hours) at 2022 1225  Last data filed at 2022 3947  Gross per 24 hour   Intake 420 ml   Output 650 ml   Net -230 ml       Physical Exam:   Physical Exam  Constitutional:       General: He is not in acute distress  HENT:      Head: Normocephalic and atraumatic  Eyes:      Conjunctiva/sclera: Conjunctivae normal    Cardiovascular:      Rate and Rhythm: Normal rate and regular rhythm  Pulmonary:      Effort: No respiratory distress  Abdominal:      General: There is no distension  Tenderness: There is no abdominal tenderness  There is no guarding  Musculoskeletal:      Right lower leg: No edema  Left lower leg: No edema  Comments: RUE sling   Skin:     General: Skin is warm and dry     Neurological:      Mental Status: Mental status is at baseline  Additional Data:     Labs:  Results from last 7 days   Lab Units 05/24/22  0506   WBC Thousand/uL 8 90   HEMOGLOBIN g/dL 7 2*   HEMATOCRIT % 22 7*   PLATELETS Thousands/uL 139*   NEUTROS PCT % 86*   LYMPHS PCT % 6*   MONOS PCT % 7   EOS PCT % 0     Results from last 7 days   Lab Units 05/25/22  0438 05/24/22  0506   SODIUM mmol/L 128* 123*   POTASSIUM mmol/L 4 0 3 2*   CHLORIDE mmol/L 92* 87*   CO2 mmol/L 28 30   BUN mg/dL 15 11   CREATININE mg/dL 0 75 0 82   ANION GAP mmol/L 8 6   CALCIUM mg/dL 7 8* 6 8*   ALBUMIN g/dL  --  2 2*   TOTAL BILIRUBIN mg/dL  --  0 82   ALK PHOS U/L  --  121*   ALT U/L  --  30   AST U/L  --  47*   GLUCOSE RANDOM mg/dL 127 127     Results from last 7 days   Lab Units 05/21/22  0622   INR  1 14             Results from last 7 days   Lab Units 05/23/22  0507 05/22/22  0423 05/21/22  0622   LACTIC ACID mmol/L  --   --  1 0   PROCALCITONIN ng/ml 0 09 0 16 0 15       Lines/Drains:  Invasive Devices  Report    Peripheral Intravenous Line  Duration           Peripheral IV 05/23/22 Left Antecubital 1 day                      Imaging: No pertinent imaging reviewed      Recent Cultures (last 7 days):   Results from last 7 days   Lab Units 05/20/22  1549   URINE CULTURE  <10,000 cfu/ml        Last 24 Hours Medication List:   Current Facility-Administered Medications   Medication Dose Route Frequency Provider Last Rate    albuterol  2 5 mg Nebulization Q6H PRN Alfrieda Dance Hostetter, DO      baricitinib  4 mg Oral Q24H [facility] [place]      calcium carbonate  1 tablet Oral Daily With Breakfast Alfrieda Dance Hostetter, DO      cholecalciferol  2,000 Units Oral Daily [facility] [place]      cyanocobalamin  100 mcg Oral Daily dT [place], DO      docusate sodium  100 mg Oral BID PRN Alfrieda Dance Hostetter, DO      enoxaparin  40 mg Subcutaneous Q24H [address] Ilya Avila MD      folic acid  1 mg Oral Daily Ilya Avila MD      [START ON 5/26/2022] hydrocortisone  50 [facility] BID [facility]      hydrocortisone sodium succinate  100 mg Intravenous Q12H [facility] [place], [place]      HYDROmorphone  1 mg Intravenous Q4H PRN Janith Cowden Hostetter,       Or   [place] oxyCODONE  10 mg Oral Q4H PRN Janith Cowden Hostetter,       hydrOXYzine HCL  25 mg Oral Q6H PRN Deepika Scott MD      levETIRAcetam  1,000 mg Oral Q12H [facility] Janith Cowden Hostetter,       magnesium sulfate  2 g Intravenous Q1H Deepika Scott MD      multivitamin-minerals  1 tablet Oral Daily Janith Cowden Hostetter,       ondansetron  4 mg Intravenous Q6H PRN Janith Cowden Hostetter, DO      pancrelipase (Lip-Prot-Amyl)  6,000 Units Oral TID With Meals Janith Cowden Hostetter, DO      pantoprazole  40 mg Oral Early Morning Janith Cowden Hostetter,       polyethylene glycol  17 g Oral Daily PRN Deepika Scott MD      potassium phosphate  9 mmol Intravenous Once Euell Sick, DO 9 mmol (05/25/22 1136)    thiamine  100 mg Oral Daily Deepika Scott MD          Today, Patient Was Seen By: Freedom Chaparro MD    **Please Note: This note may have been constructed using a voice recognition system  **

## 2022-05-25 NOTE — PLAN OF CARE
Problem: PHYSICAL THERAPY ADULT  Goal: Performs mobility at highest level of function for planned discharge setting  See evaluation for individualized goals  Description: Treatment/Interventions: Functional transfer training, LE strengthening/ROM, Endurance training, Therapeutic exercise, Bed mobility, Gait training          See flowsheet documentation for full assessment, interventions and recommendations  Outcome: Not Progressing  Note: Prognosis: Guarded  Problem List: Decreased strength, Decreased endurance, Impaired balance, Decreased mobility, Impaired judgement, Decreased safety awareness, Pain  Assessment: Pt  seen for PT treatment session this date with interventions consisting of  therapeutic exercises and review for HEP  Declined tx/gait training w/ emphasis on improving pt's ability to ambulate  Pt  In comparison to previous session, Pt  With no change in activity tolerance  Limited due to back pain and refusing to participate  Pt is in need of continued activity in PT to improve strength balance endurance mobility transfers and ambulation with return to maximize LOF  From PT/mobility standpoint, recommendation at time of d/c would be post acute rehab  in order to promote return to PLOF and independence  The patient's AM-PAC Basic Mobility Inpatient Short Form Raw Score is 11  A Raw score of less than or equal to 16 suggests the patient may benefit from discharge to post-acute rehabilitation services  Please also refer to physical therapy recommendation for safe DC planning  PT Discharge Recommendation: Post acute rehabilitation services          See flowsheet documentation for full assessment

## 2022-05-25 NOTE — ASSESSMENT & PLAN NOTE
· Patient persistently hypomagnesemic but improved, continue replacement and monitoring    Results from last 7 days   Lab Units 05/25/22  0438 05/24/22  0506 05/23/22  0507   MAGNESIUM mg/dL 1 5* 1 2* 1 0*

## 2022-05-25 NOTE — PROGRESS NOTES
Progress Note - Nephrology   Maris Whitney 55 y o  male MRN: [id number]  Unit/Bed#: 162-06 Encounter: [id number]    A/P:  1  Hyponatremia   Serum sodium level continues to improve, continue to encourage fluid restriction and use of cortical steroids  2  Adrenal insufficiency   Patient be transition from IV to oral corticosteroids, should follow-up with Endocrinology  3  Multiple electrolyte abnormalities   Continue with potassium supplementation, magnesium supplementation, as well as phosphorus supplementation as indicated  4  COVID positive  5   Alcoholism    Follow up reason for today's visit:  Hyponatremia/adrenal sufficiency/multiple electrolyte abnormalities    Hyponatremia    Patient Active Problem List   Diagnosis    Tobacco abuse    Essential hypertension    H/O suicide attempt    H/O cervical spine surgery    Hyponatremia    Dietary folate deficiency anemia    Ventral hernia without obstruction or gangrene    Hepatomegaly    Hepatic steatosis    Hypomagnesemia    Elevated alkaline phosphatase level    Alcoholic hepatitis without ascites    Vitamin D deficiency    Iron deficiency    Urinary retention    Bladder wall thickening    Hypophosphatemia    Generalized weakness    Malnutrition of moderate degree (HCC)    Hypokalemia    Continuous chronic alcoholism (HCC)    Incisional hernia    Hx of seizure disorder    Seizure-like activity (HCC)    Diarrhea    Abnormality of pancreatic duct    Allergic rhinitis    Microcytic anemia    Abdominal wound dehiscence    Cervical disc disorder with myelopathy    Cervical spinal stenosis    Depression    Left foot drop    Lumbar radiculopathy    Neuropathy involving both lower extremities    Peripheral neuropathy    T6 vertebral fracture (HCC)    Alcoholic cirrhosis of liver without ascites (HCC)    Thrombocytopenia (HCC)    Closed fracture of left olecranon process, initial encounter    Iron deficiency anemia due to chronic blood loss    Duodenal ulcer    Tubular adenoma    Traore esophagus    Celiac artery stenosis (HCC)    Pancreatic mass    Pancytopenia (HCC)    Constipation    Transaminitis    Lesion of spleen    Left anterior fascicular block    Abnormal EKG    Acute on chronic pancreatitis (HCC)    Pancreatic pseudocyst    COPD (chronic obstructive pulmonary disease) (HCC)    Ileus ([address] )    Ambulatory dysfunction    Current every day smoker    Fall    Hx of duodenal ulcer    Neck pain    Alcohol use    Thoracic compression fracture (HCC)    Aortic ectasia (HCC)    Rib fractures    Seizure disorder (HCC)    Hypoxia    Traore's esophagus    Elevated d-dimer    COVID-19 virus infection    Hypocalcemia    Clavicular fracture    Cortisol deficiency (HCC)         Subjective:   Patient continues to complain of back pain, appears to be eating drinking appropriately  Objective:     Vitals: Blood pressure 119/97, pulse 82, temperature 98 3 °F (36 8 °C), temperature source Oral, resp  rate 18, height 5' 6" (1 676 m), weight 55 3 kg (122 lb), SpO2 95 %  ,Body mass index is 19 69 kg/m²  Weight (last 2 days)     Date/Time Weight    05/23/22 0632 55 3 (122)            Intake/Output Summary (Last 24 hours) at 5/25/2022 1012  Last data filed at 5/25/2022 0835  Gross per 24 hour   Intake 660 ml   Output 650 ml   Net 10 ml     I/O last 3 completed shifts:   In: 12 [P O :960]  Out: 2625 [Urine:2625]         Physical Exam: /97 (BP Location: Left arm)   Pulse 82   Temp 98 3 °F (36 8 °C) (Oral)   Resp 18   Ht 5' 6" (1 676 m)   Wt 55 3 kg (122 lb)   SpO2 95%   BMI 19 69 kg/m²     General Appearance:    Alert, cooperative, no distress, appears stated age   Head:    Normocephalic, without obvious abnormality, atraumatic   Eyes:    Conjunctiva/corneas clear   Ears:    Normal external ears   Nose:   Nares normal, septum midline, mucosa normal, no drainage    or sinus tenderness   Throat:   Lips, mucosa, and tongue normal; teeth and gums normal   Neck:   Supple   Back:     Symmetric, no curvature, ROM normal, no CVA tenderness   Lungs:     Clear to auscultation bilaterally, respirations unlabored   Chest wall:    No tenderness or deformity   Heart:    Regular rate and rhythm, S1 and S2 normal, no murmur, rub   or gallop   Abdomen:     Soft, non-tender, bowel sounds active   Extremities:   Extremities normal, atraumatic, no cyanosis or edema   Skin:   Skin color, texture, turgor normal, no rashes or lesions   Lymph nodes:   Cervical normal   Neurologic:   CNII-XII intact            Lab, Imaging and other studies: I have personally reviewed pertinent labs  CBC: No results found for: WBC, HGB, HCT, MCV, PLT, ADJUSTEDWBC, MCH, MCHC, RDW, MPV, NRBC  CMP:   Lab Results   Component Value Date    K 4 0 05/25/2022    CL 92 (L) 05/25/2022    CO2 28 05/25/2022    BUN 15 05/25/2022    CREATININE 0 75 05/25/2022    CALCIUM 7 8 (L) 05/25/2022    EGFR 103 05/25/2022           Results from last 7 days   Lab Units 05/25/22  0438 05/24/22  0506 05/23/22  0507 05/22/22  0423   POTASSIUM mmol/L 4 0 3 2* 3 5 3 6   CHLORIDE mmol/L 92* 87* 88* 88*   CO2 mmol/L 28 30 28 28   BUN mg/dL 15 11 8 9   CREATININE mg/dL 0 75 0 82 0 69 0 72   CALCIUM mg/dL 7 8* 6 8* 7 3* 7 3*   ALK PHOS U/L  --  121* 126* 109   ALT U/L  --  30 32 31   AST U/L  --  47* 58* 48*         Phosphorus:   Lab Results   Component Value Date    PHOS 2 5 (L) 05/25/2022     Magnesium:   Lab Results   Component Value Date    MG 1 5 (L) 05/25/2022     Urinalysis: No results found for: COLORU, CLARITYU, SPECGRAV, PHUR, LEUKOCYTESUR, NITRITE, PROTEINUA, GLUCOSEU, KETONESU, BILIRUBINUR, BLOODU  Ionized Calcium: No results found for: CAION  Coagulation: No results found for: PT, INR, APTT  Troponin: No results found for: TROPONINI  ABG: No results found for: PHART, ISQ8CCY, PO2ART, BKD7DQO, D2VPWWAM, BEART, SOURCE  Radiology review:     IMAGING  Procedure: XR chest pa & lateral    Result Date: 5/24/2022  Narrative: CHEST INDICATION:   VQ scan  Covid 19 positive COMPARISON:  Chest x-ray May 20 EXAM PERFORMED/VIEWS:  XR CHEST PA & LATERAL Images: 3 FINDINGS: Developing interstitial opacities are demonstrated at the level of the upper lobes  Heart size is normal  Trachea is midline  Patient is status post cervical fusion and right humerus fixation  Old fractures of the thoracic spine are again seen  Impression: Developing groundglass opacities in the upper lobes  The study was marked in [facility] for immediate notification  Workstation performed: [id number]     Procedure: NM lung perfusion imaging    Result Date: 5/23/2022  Narrative: LUNG PERFUSION SCAN INDICATION: Hypoxia, COVID-19, elevated D-dimer COMPARISON:  Chest x-ray dated 5/23/2022 TECHNIQUE:  Multiplanar perfusion imaging was performed following the intravenous administration of 4 2 mCi Tc-99m labeled MAA  No ventilation imaging was performed as per current Covid-19 protocol  FINDINGS:  Perfusion imaging demonstrates nonuniform perfusion involving both lungs  Best seen on anterior, posterior, and right lateral spot images is a moderate-sized wedge-shaped perfusion defect involving the lateral aspect of the right midlung zone corresponding to airspace disease seen on chest x-ray  Best seen on RPO image is a large side segmental perfusion defect involving the posterior right lung base which is likely present on lateral spot image with preserved tracer activity to the margin of the right posterior lung base not excluded on lateral image  There is a wedge-shaped perfusion defect involving the posterior left lung base on left lateral spot image but without definite corresponding abnormality on additional images, therefore most likely artifactual in nature  No additional moderate or large perfusion defects are noted       Impression: Finding is most consistent with intermediate probability for acute pulmonary artery embolus with moderate to large perfusion defects involving the right lung as detailed above  Workstation performed: [id number]     Procedure: CTA chest pe study    Result Date: 5/23/2022  Narrative: CT CHEST WITHOUT IV CONTRAST INDICATION:   Pulmonary embolism (PE) suspected, positive D-dimer COVID positive, acute hypoxia, indeterminate V/Q scan, rule out PE  Patient has confirmed COVID-19  Positive on 5/20/2022  Recent fall  COMPARISON:  Chest CT from 5/20/2022 and 10/30/2021, chest radiograph from 5/23/2022  TECHNIQUE: Chest CT without intravenous contrast   Axial, sagittal, coronal 2D reformats and coronal MIPS from source data  Radiation dose length product (DLP):  361 06 mGy-cm   Radiation dose exposure minimized using iterative reconstruction and automated exposure control  FINDINGS: LUNGS:  Moderate bilateral peripheral groundglass opacity  AIRWAYS: No significant filling defects  PLEURA:  Trace effusions  HEART/GREAT VESSELS:  Normal heart size  Moderate coronary artery calcification indicating atherosclerotic heart disease  Calcification of the aortic valve leaflets which can contribute to aortic stenosis  MEDIASTINUM AND MANE:  Unremarkable  CHEST WALL AND LOWER NECK: Unremarkable  UPPER ABDOMEN:  Redemonstration of 3 7 cm pseudocyst in the pancreatic head with surrounding calcification, present since at least November 2020  Cholecystectomy  Mild fullness of the right renal collecting system OSSEOUS STRUCTURES: Acute fracture of the medial right clavicle  Mild degenerative disease in the spine with mild kyphosis  Cervicothoracic fusion  ORIF of the right humerus  Old healed spinous process fractures in the upper and mid thoracic spine  Healed bilateral rib fractures  Redemonstration of multiple compression deformities in the upper and mid thoracic spine  Impression: No pulmonary embolus  Acute mildly comminuted fracture of the medial right clavicle   Mild diffuse bilateral peripheral groundglass opacity due to Covid-19  New trace pleural effusions  This study was marked for significant notification and follow-up  Workstation performed: [id number]     Procedure: VAS lower limb venous duplex study, complete bilateral    Result Date: 5/23/2022  Narrative:  [facility] REPORT CLINICAL: Indications: Elevated d-dimer  Operative History: No cardiovascular surgeries noted  Risk Factors The patient has history of HTN, CAD and smoking (current) >2 ppd  He has no history of DVT  CONCLUSION: Impression: RIGHT LOWER LIMB: No evidence of acute or chronic deep vein thrombosis  No evidence of superficial thrombophlebitis noted  Doppler evaluation shows a normal response to augmentation maneuvers  LEFT LOWER LIMB: No evidence of acute or chronic deep vein thrombosis  No evidence of superficial thrombophlebitis noted  Doppler evaluation shows a normal response to augmentation maneuvers  SIGNATURE: Electronically Signed by: Ana Lilia Morales on 2022-05-23 04:01:11 PM    Procedure: Echo complete w/ contrast if indicated    Result Date: 5/23/2022  Narrative: [facility]  Left Ventricle: Left ventricular cavity size is normal  Wall thickness is normal  The left ventricular ejection fraction is 50%  Systolic function is low normal    The following segments are hypokinetic: apical anterior, apical septal, apical inferior, apical lateral and apex    All other segments are normal    Aortic Valve: There is mild and There is mild to moderate stenosis  The aortic valve mean gradient is 9 mmHg  The DVI is 0 39    Tricuspid Valve: There is mild regurgitation         Current Facility-Administered Medications   Medication Dose Route Frequency    albuterol inhalation solution 2 5 mg  2 5 mg Nebulization Q6H PRN    baricitinib (OLUMIANT) tablet 4 mg  4 mg Oral Q24H    calcium carbonate (OYSTER SHELL,OSCAL) 500 mg tablet 1 tablet  1 tablet Oral Daily With Breakfast    cholecalciferol (VITAMIN D3) tablet 2,000 Units  2,000 Units Oral Daily    cyanocobalamin (VITAMIN B-12) tablet 100 mcg  100 mcg Oral Daily    docusate sodium (COLACE) capsule 100 mg  100 mg Oral BID PRN    enoxaparin (LOVENOX) subcutaneous injection 40 mg  40 mg Subcutaneous [id number] KIMBERLY    folic acid (FOLVITE) tablet 1 mg  1 mg Oral Daily    [START ON 5/26/2022] hydrocortisone (CORTEF) tablet 50 mg  50 mg Oral BID    hydrocortisone sodium succinate (PF) (Solu-CORTEF) injection 100 mg  100 mg Intravenous Q12H KIMBERLY    HYDROmorphone (DILAUDID) injection 1 mg  1 mg Intravenous Q4H PRN    Or    oxyCODONE (ROXICODONE) immediate release tablet 10 mg  10 mg Oral Q4H PRN    levETIRAcetam (KEPPRA) tablet 1,000 mg  1,000 mg Oral Q12H [address]    magnesium sulfate 2 g/50 mL IVPB (premix) 2 g  2 g Intravenous Q1H    multivitamin-minerals (CENTRUM) tablet 1 tablet  1 tablet Oral Daily    ondansetron (ZOFRAN) injection 4 mg  4 mg Intravenous Q6H PRN    pancrelipase (Lip-Prot-Amyl) (CREON) delayed release capsule 6,000 Units  6,000 Units Oral TID With Meals    pantoprazole (PROTONIX) EC tablet 40 mg  40 mg Oral Early Morning    polyethylene glycol (MIRALAX) packet 17 g  17 g Oral Daily PRN    thiamine tablet 100 mg  100 mg Oral Daily     Medications Discontinued During This Encounter   Medication Reason    sodium chloride 0 9 % infusion     oxyCODONE (ROXICODONE) IR tablet 5 mg     HYDROmorphone (DILAUDID) injection 0 5 mg     cholecalciferol (VITAMIN D3) tablet 1,000 Units     nicotine (NICODERM CQ) 14 mg/24hr TD 24 hr patch 1 patch     dexamethasone (DECADRON) injection 6 mg     heparin (porcine) subcutaneous injection 5,000 Units     methylPREDNISolone sodium succinate (Solu-MEDROL) injection 40 mg     enoxaparin (LOVENOX) subcutaneous injection 60 mg     folic acid 1 mg, thiamine (VITAMIN B1) 100 mg in sodium chloride 0 9 % 100 mL IV piggyback     levalbuterol (XOPENEX) inhalation solution 1 25 mg     ipratropium (ATROVENT) 0 02 % inhalation solution 0 5 mg     magnesium sulfate 4 g/100 mL IVPB (premix) 4 g Availability       Rhina Lujan, DO      This progress note was produced in part using a dictation device which may document imprecise wording from author's original intent

## 2022-05-25 NOTE — PROGRESS NOTES
Patient referred for CTS consultation for 40 mm aortic ectasia noted on CT c/a/p 5/2022  Patient has multiple CT scans dating back to 2014; all reviewed with Dr Denisha Gillis  The ascending aortic measurement has remained stable at 39-40 mm since 2014  Recent echo with trileaflet aortic valve and normal root diameter  CTS consultation is not necessary nor is ongoing imaging for this finding and it is not significant

## 2022-05-25 NOTE — ASSESSMENT & PLAN NOTE
No evidence of bleeding with hemoglobin stable around 7  Monitor CBC transfuse for hemoglobin less than 7  Continue vitamin I10 and folic acid supplementation

## 2022-05-25 NOTE — PROGRESS NOTES
Progress Note - Pulmonary   [facility] 55 y o  male MRN: [id number]  Unit/Bed#: 583-45 [id number]    Assessment/Plan:    1  Acute hypoxic respiratory failure  · Seen on room air with adequate SpO2  · Pulmonary toilet:  Increase activity as tolerated, out of bed as tolerated, cough and deep breathing as encouraged, incentive spirometry q 1 hour  · Recommend overnight pulse ox on room air tonight and home O2 eval tomorrow  2  Moderate COVID pneumonia  · Complete remdesivir yesterday   · Continue baricitinib 4 mg p o  Daily (day#4/14)  · Systemic steroid switched to Solu-Cortef 50 mg p o  B i d  today   · Trend d-dimer/CRP every 2-3 days pending clinical response   3  Elevated D-dimer  · V/Q scan intermediate   · CTA chest and lower extremity dopplers without VTE   · Follow protocol for [facility] [place], operated by [facility]- currently on lovenox 1 mg/kg q12hrs   · At discharge, patient does not require AC  4  Suspected COPD of unknown severity with acute exacerbation   · Improved   · It is okay to switch to Solu-Cortef 50 mg po q 12 hours per nephrology recommendations  · Continue Xopenex/Atrovent nebs TID   · Continue mucinex 600 mg po q12hrs   · Would benefit from outpatient PFTs and pulmonary follow-up  5  Hyponatremia   · Improving   · Secondary to adrenal insufficiency   · Continue fluid restriction   · Treatment per nephrology   6  Nicotine dependence   · Smoking cessation encouraged     Patient is stable from a pulmonary standpoint  Will follow peripherally  Call if questions  Chief Complaint:    "My breathing is better "    Subjective:    Patient was seen and examined today  No overnight events reported  Patient states his respiratory status is improved back to baseline  Reports very rare cough with sputum production that is still yellow  Denies wheezing currently  No chest pain or palpitations  Has pain in shoulder and down his back which is chronic but also worse than baseline for him       Objective:    Vitals: Blood pressure 119/97, pulse 82, temperature 98 3 °F (36 8 °C), temperature source Oral, resp  rate 18, height 5' 6" (1 676 m), weight 55 3 kg (122 lb), SpO2 95 %  room air,Body mass index is 19 69 kg/m²  Intake/Output Summary (Last 24 hours) at 5/25/2022 1146  Last data filed at 5/25/2022 1488  Gross per 24 hour   Intake 420 ml   Output 650 ml   Net -230 ml       Invasive Devices  Report    Peripheral Intravenous Line  Duration           Peripheral IV 05/23/22 Left Antecubital 1 day                Physical Exam:     Physical Exam  Vitals and nursing note reviewed  Constitutional:       General: He is not in acute distress  Appearance: Normal appearance  HENT:      Head: Normocephalic and atraumatic  Nose: Nose normal       Mouth/Throat:      Mouth: Mucous membranes are moist       Pharynx: Oropharynx is clear  Eyes:      Extraocular Movements: Extraocular movements intact  Conjunctiva/sclera: Conjunctivae normal    Cardiovascular:      Rate and Rhythm: Normal rate and regular rhythm  Pulses: Normal pulses  Heart sounds: No murmur heard  Pulmonary:      Effort: Pulmonary effort is normal  No respiratory distress  Breath sounds: No wheezing or rhonchi  Abdominal:      General: Bowel sounds are normal       Palpations: Abdomen is soft  Tenderness: There is no abdominal tenderness  Musculoskeletal:         General: Normal range of motion  Cervical back: Normal range of motion and neck supple  No muscular tenderness  Right lower leg: No edema  Left lower leg: No edema  Skin:     General: Skin is warm and dry  Neurological:      General: No focal deficit present  Mental Status: He is alert  Mental status is at baseline  Psychiatric:         Mood and Affect: Mood normal          Behavior: Behavior normal          Labs: I have personally reviewed pertinent lab results  , ABG: No results found for: PHART, GYW9LIX, PO2ART, WPQ1KHH, K8EOOFSO, BEART, SOURCE, BNP: No results found for: BNP, CBC: No results found for: WBC, HGB, HCT, MCV, PLT, ADJUSTEDWBC, MCH, MCHC, RDW, MPV, NRBC, CMP:   Lab Results   Component Value Date    SODIUM 128 (L) 05/25/2022    K 4 0 05/25/2022    CL 92 (L) 05/25/2022    CO2 28 05/25/2022    BUN 15 05/25/2022    CREATININE 0 75 05/25/2022    CALCIUM 7 8 (L) 05/25/2022    EGFR 103 05/25/2022   , PT/INR: No results found for: PT, INR, Troponin: No results found for: TROPONINI    Imaging and other studies: none to review today

## 2022-05-25 NOTE — PLAN OF CARE
Problem: Nutrition/Hydration-ADULT  Goal: Nutrient/Hydration intake appropriate for improving, restoring or maintaining nutritional needs  Description: Monitor and assess patient's nutrition/hydration status for malnutrition  Collaborate with interdisciplinary team and initiate plan and interventions as ordered  Monitor patient's weight and dietary intake as ordered or per policy  Utilize nutrition screening tool and intervene as necessary  Determine patient's food preferences and provide high-protein, high-caloric foods as appropriate       INTERVENTIONS:  - Monitor oral intake, urinary output, labs, and treatment plans  - Assess nutrition and hydration status and recommend course of action  - Evaluate amount of meals eaten  - Assist patient with eating if necessary   - Allow adequate time for meals  - Recommend/ encourage appropriate diets, oral nutritional supplements, and vitamin/mineral supplements  - Order, calculate, and assess calorie counts as needed  - Recommend, monitor, and adjust tube feedings and TPN/PPN based on assessed needs  - Assess need for intravenous fluids  - Provide specific nutrition/hydration education as appropriate  - Include patient/family/caregiver in decisions related to nutrition  Outcome: Progressing     Problem: MOBILITY - ADULT  Goal: Maintain or return to baseline ADL function  Description: INTERVENTIONS:  -  Assess patient's ability to carry out ADLs; assess patient's baseline for ADL function and identify physical deficits which impact ability to perform ADLs (bathing, care of mouth/teeth, toileting, grooming, dressing, etc )  - Assess/evaluate cause of self-care deficits   - Assess range of motion  - Assess patient's mobility; develop plan if impaired  - Assess patient's need for assistive devices and provide as appropriate  - Encourage maximum independence but intervene and supervise when necessary  - Involve family in performance of ADLs  - Assess for home care needs following discharge   - Consider OT consult to assist with ADL evaluation and planning for discharge  - Provide patient education as appropriate  Outcome: Progressing  Goal: Maintains/Returns to pre admission functional level  Description: INTERVENTIONS:  - Perform BMAT or MOVE assessment daily    - Set and communicate daily mobility goal to care team and patient/family/caregiver  - Collaborate with rehabilitation services on mobility goals if consulted  - Perform Range of Motion 4 times a day  - Reposition patient every 2 hours    - Dangle patient 3 times a day  - Stand patient 3 times a day  - Ambulate patient 3 times a day  - Out of bed to chair 3 times a day   - Out of bed for meals 3 times a day  - Out of bed for toileting  - Record patient progress and toleration of activity level   Outcome: Progressing     Problem: Potential for Falls  Goal: Patient will remain free of falls  Description: INTERVENTIONS:  - Educate patient/family on patient safety including physical limitations  - Instruct patient to call for assistance with activity   - Consult OT/PT to assist with strengthening/mobility   - Keep Call bell within reach  - Keep bed low and locked with side rails adjusted as appropriate  - Keep care items and personal belongings within reach  - Initiate and maintain comfort rounds  - Make Fall Risk Sign visible to staff  - Offer Toileting every 2 Hours, in advance of need  - Initiate/Maintain fall alarm  - Obtain necessary fall risk management equipment: alarm, nonskid socks, yellow bracelet  - Apply yellow socks and bracelet for high fall risk patients  - Consider moving patient to room near nurses station  Outcome: Progressing     Problem: PAIN - ADULT  Goal: Verbalizes/displays adequate comfort level or baseline comfort level  Description: Interventions:  - Encourage patient to monitor pain and request assistance  - Assess pain using appropriate pain scale  - Administer analgesics based on type and severity of pain and evaluate response  - Implement non-pharmacological measures as appropriate and evaluate response  - Consider cultural and social influences on pain and pain management  - Notify physician/advanced practitioner if interventions unsuccessful or patient reports new pain  Outcome: Progressing     Problem: INFECTION - ADULT  Goal: Absence or prevention of progression during hospitalization  Description: INTERVENTIONS:  - Assess and monitor for signs and symptoms of infection  - Monitor lab/diagnostic results  - Monitor all insertion sites, i e  indwelling lines, tubes, and drains  - Monitor endotracheal if appropriate and nasal secretions for changes in amount and color  - [place] appropriate cooling/warming therapies per order  - Administer medications as ordered  - Instruct and encourage patient and family to use good hand hygiene technique  - Identify and instruct in appropriate isolation precautions for identified infection/condition  Outcome: Progressing     Problem: SAFETY ADULT  Goal: Maintain or return to baseline ADL function  Description: INTERVENTIONS:  -  Assess patient's ability to carry out ADLs; assess patient's baseline for ADL function and identify physical deficits which impact ability to perform ADLs (bathing, care of mouth/teeth, toileting, grooming, dressing, etc )  - Assess/evaluate cause of self-care deficits   - Assess range of motion  - Assess patient's mobility; develop plan if impaired  - Assess patient's need for assistive devices and provide as appropriate  - Encourage maximum independence but intervene and supervise when necessary  - Involve family in performance of ADLs  - Assess for home care needs following discharge   - Consider OT consult to assist with ADL evaluation and planning for discharge  - Provide patient education as appropriate  Outcome: Progressing  Goal: Maintains/Returns to pre admission functional level  Description: INTERVENTIONS:  - Perform BMAT or MOVE assessment daily    - Set and communicate daily mobility goal to care team and patient/family/caregiver  - Collaborate with rehabilitation services on mobility goals if consulted  - Perform Range of Motion 4 times a day  - Reposition patient every 2 hours    - Dangle patient 3 times a day  - Stand patient 3 times a day  - Ambulate patient 3 times a day  - Out of bed to chair 3 times a day   - Out of bed for meals 3 times a day  - Out of bed for toileting  - Record patient progress and toleration of activity level   Outcome: Progressing  Goal: Patient will remain free of falls  Description: INTERVENTIONS:  - Educate patient/family on patient safety including physical limitations  - Instruct patient to call for assistance with activity   - Consult OT/PT to assist with strengthening/mobility   - Keep Call bell within reach  - Keep bed low and locked with side rails adjusted as appropriate  - Keep care items and personal belongings within reach  - Initiate and maintain comfort rounds  - Make Fall Risk Sign visible to staff  - Offer Toileting every 2 Hours, in advance of need  - Initiate/Maintain fall alarm  - Obtain necessary fall risk management equipment: alarm, nonskid socks, yellow bracelet  - Apply yellow socks and bracelet for high fall risk patients  - Consider moving patient to room near nurses station  Outcome: Progressing     Problem: DISCHARGE PLANNING  Goal: Discharge to home or other facility with appropriate resources  Description: INTERVENTIONS:  - Identify barriers to discharge w/patient and caregiver  - Arrange for needed discharge resources and transportation as appropriate  - Identify discharge learning needs (meds, wound care, etc )  - Arrange for interpretive services to assist at discharge as needed  - Refer to Case Management Department for coordinating discharge planning if the patient needs post-hospital services based on physician/advanced practitioner order or complex needs related to functional status, cognitive ability, or social support system  Outcome: Progressing     Problem: Knowledge Deficit  Goal: Patient/family/caregiver demonstrates understanding of disease process, treatment plan, medications, and discharge instructions  Description: Complete learning assessment and assess knowledge base    Interventions:  - Provide teaching at level of understanding  - Provide teaching via preferred learning methods  Outcome: Progressing     Problem: RESPIRATORY - ADULT  Goal: Achieves optimal ventilation and oxygenation  Description: INTERVENTIONS:  - Assess for changes in respiratory status  - Assess for changes in mentation and behavior  - Position to facilitate oxygenation and minimize respiratory effort  - Oxygen administered by appropriate delivery if ordered  - Initiate smoking cessation education as indicated  - Encourage broncho-pulmonary hygiene including cough, deep breathe, Incentive Spirometry  - Assess the need for suctioning and aspirate as needed  - Assess and instruct to report SOB or any respiratory difficulty  - Respiratory Therapy support as indicated  Outcome: Progressing     Problem: GENITOURINARY - ADULT  Goal: Maintains or returns to baseline urinary function  Description: INTERVENTIONS:  - Assess urinary function  - Encourage oral fluids to ensure adequate hydration if ordered  - Administer IV fluids as ordered to ensure adequate hydration  - Administer ordered medications as needed  - Offer frequent toileting  - Follow urinary retention protocol if ordered  Outcome: Progressing  Goal: Absence of urinary retention  Description: INTERVENTIONS:  - Assess patients ability to void and empty bladder  - Monitor I/O  - Bladder scan as needed  - Discuss with physician/AP medications to alleviate retention as needed  - Discuss catheterization for long term situations as appropriate  Outcome: Progressing     Problem: METABOLIC, FLUID AND ELECTROLYTES - ADULT  Goal: Electrolytes maintained within normal limits  Description: INTERVENTIONS:  - Monitor labs and assess patient for signs and symptoms of electrolyte imbalances  - Administer electrolyte replacement as ordered  - Monitor response to electrolyte replacements, including repeat lab results as appropriate  - Instruct patient on fluid and nutrition as appropriate  Outcome: Progressing  Goal: Fluid balance maintained  Description: INTERVENTIONS:  - Monitor labs   - Monitor I/O and WT  - Instruct patient on fluid and nutrition as appropriate  - Assess for signs & symptoms of volume excess or deficit  Outcome: Progressing     Problem: MUSCULOSKELETAL - ADULT  Goal: Maintain or return mobility to safest level of function  Description: INTERVENTIONS:  - Assess patient's ability to carry out ADLs; assess patient's baseline for ADL function and identify physical deficits which impact ability to perform ADLs (bathing, care of mouth/teeth, toileting, grooming, dressing, etc )  - Assess/evaluate cause of self-care deficits   - Assess range of motion  - Assess patient's mobility  - Assess patient's need for assistive devices and provide as appropriate  - Encourage maximum independence but intervene and supervise when necessary  - Involve family in performance of ADLs  - Assess for home care needs following discharge   - Consider OT consult to assist with ADL evaluation and planning for discharge  - Provide patient education as appropriate  Outcome: Progressing  Goal: Maintain proper alignment of affected body part  Description: INTERVENTIONS:  - Support, maintain and protect limb and body alignment  - Provide patient/ family with appropriate education  Outcome: Progressing

## 2022-05-25 NOTE — PLAN OF CARE
Problem: Nutrition/Hydration-ADULT  Goal: Nutrient/Hydration intake appropriate for improving, restoring or maintaining nutritional needs  Description: Monitor and assess patient's nutrition/hydration status for malnutrition  Collaborate with interdisciplinary team and initiate plan and interventions as ordered  Monitor patient's weight and dietary intake as ordered or per policy  Utilize nutrition screening tool and intervene as necessary  Determine patient's food preferences and provide high-protein, high-caloric foods as appropriate       INTERVENTIONS:  - Monitor oral intake, urinary output, labs, and treatment plans  - Assess nutrition and hydration status and recommend course of action  - Evaluate amount of meals eaten  - Assist patient with eating if necessary   - Allow adequate time for meals  - Recommend/ encourage appropriate diets, oral nutritional supplements, and vitamin/mineral supplements  - Order, calculate, and assess calorie counts as needed  - Recommend, monitor, and adjust tube feedings and TPN/PPN based on assessed needs  - Assess need for intravenous fluids  - Provide specific nutrition/hydration education as appropriate  - Include patient/family/caregiver in decisions related to nutrition  Outcome: Progressing     Problem: MOBILITY - ADULT  Goal: Maintain or return to baseline ADL function  Description: INTERVENTIONS:  -  Assess patient's ability to carry out ADLs; assess patient's baseline for ADL function and identify physical deficits which impact ability to perform ADLs (bathing, care of mouth/teeth, toileting, grooming, dressing, etc )  - Assess/evaluate cause of self-care deficits   - Assess range of motion  - Assess patient's mobility; develop plan if impaired  - Assess patient's need for assistive devices and provide as appropriate  - Encourage maximum independence but intervene and supervise when necessary  - Involve family in performance of ADLs  - Assess for home care needs following discharge   - Consider OT consult to assist with ADL evaluation and planning for discharge  - Provide patient education as appropriate  Outcome: Progressing  Goal: Maintains/Returns to pre admission functional level  Description: INTERVENTIONS:  - Perform BMAT or MOVE assessment daily    - Set and communicate daily mobility goal to care team and patient/family/caregiver  - Collaborate with rehabilitation services on mobility goals if consulted  - Perform Range of Motion 4 times a day  - Reposition patient every 2 hours    - Dangle patient 3 times a day  - Stand patient 3 times a day  - Ambulate patient 3 times a day  - Out of bed to chair 3 times a day   - Out of bed for meals 3 times a day  - Out of bed for toileting  - Record patient progress and toleration of activity level   Outcome: Progressing     Problem: Potential for Falls  Goal: Patient will remain free of falls  Description: INTERVENTIONS:  - Educate patient/family on patient safety including physical limitations  - Instruct patient to call for assistance with activity   - Consult OT/PT to assist with strengthening/mobility   - Keep Call bell within reach  - Keep bed low and locked with side rails adjusted as appropriate  - Keep care items and personal belongings within reach  - Initiate and maintain comfort rounds  - Make Fall Risk Sign visible to staff  - Offer Toileting every 2 Hours, in advance of need  - Initiate/Maintain fall alarm  - Obtain necessary fall risk management equipment: alarm, nonskid socks, yellow bracelet  - Apply yellow socks and bracelet for high fall risk patients  - Consider moving patient to room near nurses station  Outcome: Progressing     Problem: PAIN - ADULT  Goal: Verbalizes/displays adequate comfort level or baseline comfort level  Description: Interventions:  - Encourage patient to monitor pain and request assistance  - Assess pain using appropriate pain scale  - Administer analgesics based on type and severity of pain and evaluate response  - Implement non-pharmacological measures as appropriate and evaluate response  - Consider cultural and social influences on pain and pain management  - Notify physician/advanced practitioner if interventions unsuccessful or patient reports new pain  Outcome: Progressing     Problem: INFECTION - ADULT  Goal: Absence or prevention of progression during hospitalization  Description: INTERVENTIONS:  - Assess and monitor for signs and symptoms of infection  - Monitor lab/diagnostic results  - Monitor all insertion sites, i e  indwelling lines, tubes, and drains  - Monitor endotracheal if appropriate and nasal secretions for changes in amount and color  - [place] appropriate cooling/warming therapies per order  - Administer medications as ordered  - Instruct and encourage patient and family to use good hand hygiene technique  - Identify and instruct in appropriate isolation precautions for identified infection/condition  Outcome: Progressing     Problem: SAFETY ADULT  Goal: Maintain or return to baseline ADL function  Description: INTERVENTIONS:  -  Assess patient's ability to carry out ADLs; assess patient's baseline for ADL function and identify physical deficits which impact ability to perform ADLs (bathing, care of mouth/teeth, toileting, grooming, dressing, etc )  - Assess/evaluate cause of self-care deficits   - Assess range of motion  - Assess patient's mobility; develop plan if impaired  - Assess patient's need for assistive devices and provide as appropriate  - Encourage maximum independence but intervene and supervise when necessary  - Involve family in performance of ADLs  - Assess for home care needs following discharge   - Consider OT consult to assist with ADL evaluation and planning for discharge  - Provide patient education as appropriate  Outcome: Progressing  Goal: Maintains/Returns to pre admission functional level  Description: INTERVENTIONS:  - Perform BMAT or MOVE assessment daily    - Set and communicate daily mobility goal to care team and patient/family/caregiver  - Collaborate with rehabilitation services on mobility goals if consulted  - Perform Range of Motion 4 times a day  - Reposition patient every 2 hours    - Dangle patient 3 times a day  - Stand patient 3 times a day  - Ambulate patient 3 times a day  - Out of bed to chair 3 times a day   - Out of bed for meals 3 times a day  - Out of bed for toileting  - Record patient progress and toleration of activity level   Outcome: Progressing  Goal: Patient will remain free of falls  Description: INTERVENTIONS:  - Educate patient/family on patient safety including physical limitations  - Instruct patient to call for assistance with activity   - Consult OT/PT to assist with strengthening/mobility   - Keep Call bell within reach  - Keep bed low and locked with side rails adjusted as appropriate  - Keep care items and personal belongings within reach  - Initiate and maintain comfort rounds  - Make Fall Risk Sign visible to staff  - Offer Toileting every 2 Hours, in advance of need  - Initiate/Maintain fall alarm  - Obtain necessary fall risk management equipment: alarm, nonskid socks, yellow bracelet  - Apply yellow socks and bracelet for high fall risk patients  - Consider moving patient to room near nurses station  Outcome: Progressing     Problem: DISCHARGE PLANNING  Goal: Discharge to home or other facility with appropriate resources  Description: INTERVENTIONS:  - Identify barriers to discharge w/patient and caregiver  - Arrange for needed discharge resources and transportation as appropriate  - Identify discharge learning needs (meds, wound care, etc )  - Arrange for interpretive services to assist at discharge as needed  - Refer to Case Management Department for coordinating discharge planning if the patient needs post-hospital services based on physician/advanced practitioner order or complex needs related to functional status, cognitive ability, or social support system  Outcome: Progressing     Problem: Knowledge Deficit  Goal: Patient/family/caregiver demonstrates understanding of disease process, treatment plan, medications, and discharge instructions  Description: Complete learning assessment and assess knowledge base    Interventions:  - Provide teaching at level of understanding  - Provide teaching via preferred learning methods  Outcome: Progressing     Problem: RESPIRATORY - ADULT  Goal: Achieves optimal ventilation and oxygenation  Description: INTERVENTIONS:  - Assess for changes in respiratory status  - Assess for changes in mentation and behavior  - Position to facilitate oxygenation and minimize respiratory effort  - Oxygen administered by appropriate delivery if ordered  - Initiate smoking cessation education as indicated  - Encourage broncho-pulmonary hygiene including cough, deep breathe, Incentive Spirometry  - Assess the need for suctioning and aspirate as needed  - Assess and instruct to report SOB or any respiratory difficulty  - Respiratory Therapy support as indicated  Outcome: Progressing     Problem: GENITOURINARY - ADULT  Goal: Maintains or returns to baseline urinary function  Description: INTERVENTIONS:  - Assess urinary function  - Encourage oral fluids to ensure adequate hydration if ordered  - Administer IV fluids as ordered to ensure adequate hydration  - Administer ordered medications as needed  - Offer frequent toileting  - Follow urinary retention protocol if ordered  Outcome: Progressing  Goal: Absence of urinary retention  Description: INTERVENTIONS:  - Assess patients ability to void and empty bladder  - Monitor I/O  - Bladder scan as needed  - Discuss with physician/AP medications to alleviate retention as needed  - Discuss catheterization for long term situations as appropriate  Outcome: Progressing     Problem: METABOLIC, FLUID AND ELECTROLYTES - ADULT  Goal: Electrolytes maintained within normal limits  Description: INTERVENTIONS:  - Monitor labs and assess patient for signs and symptoms of electrolyte imbalances  - Administer electrolyte replacement as ordered  - Monitor response to electrolyte replacements, including repeat lab results as appropriate  - Instruct patient on fluid and nutrition as appropriate  Outcome: Progressing  Goal: Fluid balance maintained  Description: INTERVENTIONS:  - Monitor labs   - Monitor I/O and WT  - Instruct patient on fluid and nutrition as appropriate  - Assess for signs & symptoms of volume excess or deficit  Outcome: Progressing     Problem: MUSCULOSKELETAL - ADULT  Goal: Maintain or return mobility to safest level of function  Description: INTERVENTIONS:  - Assess patient's ability to carry out ADLs; assess patient's baseline for ADL function and identify physical deficits which impact ability to perform ADLs (bathing, care of mouth/teeth, toileting, grooming, dressing, etc )  - Assess/evaluate cause of self-care deficits   - Assess range of motion  - Assess patient's mobility  - Assess patient's need for assistive devices and provide as appropriate  - Encourage maximum independence but intervene and supervise when necessary  - Involve family in performance of ADLs  - Assess for home care needs following discharge   - Consider OT consult to assist with ADL evaluation and planning for discharge  - Provide patient education as appropriate  Outcome: Progressing  Goal: Maintain proper alignment of affected body part  Description: INTERVENTIONS:  - Support, maintain and protect limb and body alignment  - Provide patient/ family with appropriate education  Outcome: Progressing

## 2022-05-25 NOTE — ASSESSMENT & PLAN NOTE
· Likely due to liver disease  · Improved  · Follow the platelet count  · Continue DVT prophylaxis     Results from last 7 days   Lab Units 05/24/22  0506 05/23/22  0507 05/22/22  0423   WBC Thousand/uL 8 90 8 46 6 73   HEMOGLOBIN g/dL 7 2* 7 2* 7 3*   HEMATOCRIT % 22 7* 23 1* 24 2*   PLATELETS Thousands/uL 139* 119* 95*

## 2022-05-25 NOTE — CASE MANAGEMENT
Case Management Discharge Planning Note    Patient name Cleo Spencer  Location [address] [id number] MRN [id number]  : 1965 Date 2022       Current Admission Date: 2022  Current Admission Diagnosis:Hyponatremia   Patient Active Problem List    Diagnosis Date Noted    Chronic anemia 2022    Clavicular fracture 2022    Cortisol deficiency ([address] ) 2022    Hypocalcemia 2022    Elevated d-dimer 2022    COVID-19 virus infection 2022    Alcohol use 2022    Thoracic compression fracture ([address] ) 2022    Aortic ectasia ([address] ) 2022    Rib fractures 2022    Seizure disorder ([address] ) 2022    Hypoxia 2022    Traore's esophagus 2022    Ambulatory dysfunction 2021    Current every day smoker 2021    Fall 2021    Hx of duodenal ulcer 2021    Neck pain 2021    Acute on chronic pancreatitis ([address] ) 2020    Pancreatic pseudocyst 2020    COPD (chronic obstructive pulmonary disease) ([address] ) 2020    Ileus ([address] ) 2020    Abnormal EKG 2020    Lesion of spleen 2020    Left anterior fascicular block 2020    Constipation 2020    Transaminitis 2020    Celiac artery stenosis ([address] ) 2020    Pancreatic mass 2020    Pancytopenia ([address] ) 2020    Traore esophagus     Duodenal ulcer 2019    Tubular adenoma 2019    Iron deficiency anemia due to chronic blood loss 10/22/2019    Closed fracture of left olecranon process, initial encounter 2019    Thrombocytopenia ([address] )     Alcoholic cirrhosis of liver without ascites ([address] ) 2018    Seizure-like activity ([address] ) 2018    Diarrhea 2018    Abnormality of pancreatic duct 2017    Hx of seizure disorder 10/12/2017    Incisional hernia 10/08/2017    Continuous chronic alcoholism ([facility] ) 10/05/2017    T6 vertebral fracture ([facility] ) 2017    Neuropathy involving both lower extremities 08/15/2017    Hypokalemia 07/29/2017    Malnutrition of moderate degree (HCC) 05/18/2017    Generalized weakness 05/17/2017    Bladder wall thickening 03/09/2017    Hypophosphatemia 03/09/2017    Urinary retention 31/59/0413    Alcoholic hepatitis without ascites 03/07/2017    Vitamin D deficiency 03/07/2017    Iron deficiency 03/07/2017    Depression 02/08/2017    Elevated alkaline phosphatase level 01/14/2017    Hepatomegaly 01/12/2017    Hepatic steatosis 01/12/2017    Hypomagnesemia 01/12/2017    Hyponatremia 01/11/2017    Dietary folate deficiency anemia 01/11/2017    Ventral hernia without obstruction or gangrene 01/11/2017    Abdominal wound dehiscence 12/15/2016    Microcytic anemia 06/20/2016    Allergic rhinitis 06/07/2016    Tobacco abuse 05/22/2016    Essential hypertension 05/22/2016    H/O suicide attempt 05/22/2016    H/O cervical spine surgery 05/22/2016    Left foot drop 10/31/2014    Peripheral neuropathy 10/31/2014    Cervical disc disorder with myelopathy 09/25/2014    Lumbar radiculopathy 09/25/2014    Cervical spinal stenosis 07/30/2014      LOS (days): 5  Geometric Mean LOS (GMLOS) (days): 5 40  Days to GMLOS:0 3     OBJECTIVE:  Risk of Unplanned Readmission Score: 20 73         Current admission status: Inpatient   Preferred Pharmacy:   [facility] [place] - [address] [facility], [address]  [facility] [place] [address]  Phone: [phone] Fax: [phone]    Primary Care Provider: LORA Das    Primary Insurance: [facility]  Secondary Insurance:     DISCHARGE DETAILS:CM discussed with pt STR on dc as recommended by therapy  Pt is refusing inpatient rehab and wants to return home and continue OP Physical therapy at [address] [place] [address] in [place]

## 2022-05-26 ENCOUNTER — TELEPHONE (OUTPATIENT)
Dept: SURGERY | Facility: CLINIC | Age: 57
End: 2022-05-26

## 2022-05-26 VITALS
DIASTOLIC BLOOD PRESSURE: 75 MMHG | SYSTOLIC BLOOD PRESSURE: 133 MMHG | TEMPERATURE: 98 F | OXYGEN SATURATION: 96 % | HEIGHT: 66 IN | HEART RATE: 80 BPM | RESPIRATION RATE: 19 BRPM | WEIGHT: 122 LBS | BODY MASS INDEX: 19.61 KG/M2

## 2022-05-26 LAB
ALBUMIN SERPL BCP-MCNC: 2.2 G/DL (ref 3.5–5)
ALP SERPL-CCNC: 160 U/L (ref 46–116)
ALT SERPL W P-5'-P-CCNC: 37 U/L (ref 12–78)
ANION GAP SERPL CALCULATED.3IONS-SCNC: 8 MMOL/L (ref 4–13)
AST SERPL W P-5'-P-CCNC: 80 U/L (ref 5–45)
BASOPHILS # BLD AUTO: 0 THOUSANDS/ΜL (ref 0–0.1)
BASOPHILS NFR BLD AUTO: 0 % (ref 0–1)
BILIRUB SERPL-MCNC: 0.91 MG/DL (ref 0.2–1)
BUN SERPL-MCNC: 15 MG/DL (ref 5–25)
CA-I BLD-SCNC: 1.01 MMOL/L (ref 1.12–1.32)
CALCIUM ALBUM COR SERPL-MCNC: 8.8 MG/DL (ref 8.3–10.1)
CALCIUM SERPL-MCNC: 7.4 MG/DL (ref 8.3–10.1)
CHLORIDE SERPL-SCNC: 92 MMOL/L (ref 100–108)
CO2 SERPL-SCNC: 30 MMOL/L (ref 21–32)
CREAT SERPL-MCNC: 0.71 MG/DL (ref 0.6–1.3)
DME PARACHUTE DELIVERY DATE ACTUAL: NORMAL
DME PARACHUTE DELIVERY DATE EXPECTED: NORMAL
DME PARACHUTE DELIVERY DATE REQUESTED: NORMAL
DME PARACHUTE ITEM DESCRIPTION: NORMAL
DME PARACHUTE ORDER STATUS: NORMAL
DME PARACHUTE SUPPLIER NAME: NORMAL
DME PARACHUTE SUPPLIER PHONE: NORMAL
EOSINOPHIL # BLD AUTO: 0 THOUSAND/ΜL (ref 0–0.61)
EOSINOPHIL NFR BLD AUTO: 0 % (ref 0–6)
ERYTHROCYTE [DISTWIDTH] IN BLOOD BY AUTOMATED COUNT: 27.8 % (ref 11.6–15.1)
GFR SERPL CREATININE-BSD FRML MDRD: 105 ML/MIN/1.73SQ M
GLUCOSE SERPL-MCNC: 131 MG/DL (ref 65–140)
HCT VFR BLD AUTO: 23.3 % (ref 36.5–49.3)
HGB BLD-MCNC: 7.1 G/DL (ref 12–17)
IMM GRANULOCYTES # BLD AUTO: 0.05 THOUSAND/UL (ref 0–0.2)
IMM GRANULOCYTES NFR BLD AUTO: 1 % (ref 0–2)
LYMPHOCYTES # BLD AUTO: 0.31 THOUSANDS/ΜL (ref 0.6–4.47)
LYMPHOCYTES NFR BLD AUTO: 5 % (ref 14–44)
MAGNESIUM SERPL-MCNC: 1.8 MG/DL (ref 1.6–2.6)
MCH RBC QN AUTO: 21.9 PG (ref 26.8–34.3)
MCHC RBC AUTO-ENTMCNC: 30.5 G/DL (ref 31.4–37.4)
MCV RBC AUTO: 72 FL (ref 82–98)
MONOCYTES # BLD AUTO: 0.5 THOUSAND/ΜL (ref 0.17–1.22)
MONOCYTES NFR BLD AUTO: 8 % (ref 4–12)
NEUTROPHILS # BLD AUTO: 5.6 THOUSANDS/ΜL (ref 1.85–7.62)
NEUTS SEG NFR BLD AUTO: 86 % (ref 43–75)
NRBC BLD AUTO-RTO: 0 /100 WBCS
PLATELET # BLD AUTO: 165 THOUSANDS/UL (ref 149–390)
PMV BLD AUTO: 11 FL (ref 8.9–12.7)
POTASSIUM SERPL-SCNC: 2.6 MMOL/L (ref 3.5–5.3)
PROT SERPL-MCNC: 5.8 G/DL (ref 6.4–8.2)
RBC # BLD AUTO: 3.24 MILLION/UL (ref 3.88–5.62)
SODIUM SERPL-SCNC: 130 MMOL/L (ref 136–145)
WBC # BLD AUTO: 6.46 THOUSAND/UL (ref 4.31–10.16)

## 2022-05-26 PROCEDURE — 83735 ASSAY OF MAGNESIUM: CPT | Performed by: FAMILY MEDICINE

## 2022-05-26 PROCEDURE — 80053 COMPREHEN METABOLIC PANEL: CPT | Performed by: FAMILY MEDICINE

## 2022-05-26 PROCEDURE — 99239 HOSP IP/OBS DSCHRG MGMT >30: CPT | Performed by: FAMILY MEDICINE

## 2022-05-26 PROCEDURE — 99232 SBSQ HOSP IP/OBS MODERATE 35: CPT | Performed by: PHYSICIAN ASSISTANT

## 2022-05-26 PROCEDURE — 85025 COMPLETE CBC W/AUTO DIFF WBC: CPT | Performed by: FAMILY MEDICINE

## 2022-05-26 PROCEDURE — 82330 ASSAY OF CALCIUM: CPT | Performed by: FAMILY MEDICINE

## 2022-05-26 RX ORDER — POTASSIUM CHLORIDE 20 MEQ/1
20 TABLET, EXTENDED RELEASE ORAL DAILY
Qty: 2 TABLET | Refills: 0 | Status: SHIPPED | OUTPATIENT
Start: 2022-05-26 | End: 2022-05-27

## 2022-05-26 RX ORDER — HYDROCORTISONE 10 MG/1
50 TABLET ORAL 2 TIMES DAILY
Qty: 60 TABLET | Refills: 0 | Status: SHIPPED | OUTPATIENT
Start: 2022-05-26

## 2022-05-26 RX ORDER — POTASSIUM CHLORIDE 20 MEQ/1
40 TABLET, EXTENDED RELEASE ORAL 2 TIMES DAILY
Status: DISCONTINUED | OUTPATIENT
Start: 2022-05-26 | End: 2022-05-26 | Stop reason: HOSPADM

## 2022-05-26 RX ORDER — OXYCODONE HYDROCHLORIDE 5 MG/1
5 TABLET ORAL EVERY 6 HOURS PRN
Qty: 12 TABLET | Refills: 0 | Status: SHIPPED | OUTPATIENT
Start: 2022-05-26 | End: 2022-05-29

## 2022-05-26 RX ORDER — HYDROCORTISONE 10 MG/1
50 TABLET ORAL 2 TIMES DAILY
Qty: 30 TABLET | Refills: 0 | Status: SHIPPED | OUTPATIENT
Start: 2022-05-26 | End: 2022-05-26 | Stop reason: SDUPTHER

## 2022-05-26 RX ORDER — POTASSIUM CHLORIDE 14.9 MG/ML
20 INJECTION INTRAVENOUS
Status: COMPLETED | OUTPATIENT
Start: 2022-05-26 | End: 2022-05-26

## 2022-05-26 RX ORDER — HYDROXYZINE HYDROCHLORIDE 25 MG/1
25 TABLET, FILM COATED ORAL EVERY 6 HOURS PRN
Qty: 90 TABLET | Refills: 0 | Status: SHIPPED | OUTPATIENT
Start: 2022-05-26

## 2022-05-26 RX ADMIN — BARICITINIB 4 MG: 2 TABLET, FILM COATED ORAL at 08:13

## 2022-05-26 RX ADMIN — THIAMINE HCL TAB 100 MG 100 MG: 100 TAB at 08:13

## 2022-05-26 RX ADMIN — PANTOPRAZOLE SODIUM 40 MG: 40 TABLET, DELAYED RELEASE ORAL at 05:22

## 2022-05-26 RX ADMIN — HYDROMORPHONE HYDROCHLORIDE 1 MG: 1 INJECTION, SOLUTION INTRAMUSCULAR; INTRAVENOUS; SUBCUTANEOUS at 01:28

## 2022-05-26 RX ADMIN — MULTIPLE VITAMINS W/ MINERALS TAB 1 TABLET: TAB at 08:13

## 2022-05-26 RX ADMIN — FOLIC ACID 1 MG: 1 TABLET ORAL at 08:14

## 2022-05-26 RX ADMIN — POTASSIUM CHLORIDE 20 MEQ: 14.9 INJECTION, SOLUTION INTRAVENOUS at 10:31

## 2022-05-26 RX ADMIN — POTASSIUM CHLORIDE 40 MEQ: 20 TABLET, EXTENDED RELEASE ORAL at 10:31

## 2022-05-26 RX ADMIN — HYDROXYZINE HYDROCHLORIDE 25 MG: 25 TABLET ORAL at 08:13

## 2022-05-26 RX ADMIN — ENOXAPARIN SODIUM 40 MG: 40 INJECTION SUBCUTANEOUS at 08:13

## 2022-05-26 RX ADMIN — HYDROCORTISONE 50 MG: 10 TABLET ORAL at 08:12

## 2022-05-26 RX ADMIN — HYDROXYZINE HYDROCHLORIDE 25 MG: 25 TABLET ORAL at 01:33

## 2022-05-26 RX ADMIN — HYDROMORPHONE HYDROCHLORIDE 1 MG: 1 INJECTION, SOLUTION INTRAMUSCULAR; INTRAVENOUS; SUBCUTANEOUS at 07:27

## 2022-05-26 RX ADMIN — CHOLECALCIFEROL TAB 25 MCG (1000 UNIT) 2000 UNITS: 25 TAB at 08:14

## 2022-05-26 RX ADMIN — VITAM B12 100 MCG: 100 TAB at 08:13

## 2022-05-26 RX ADMIN — POTASSIUM CHLORIDE 20 MEQ: 14.9 INJECTION, SOLUTION INTRAVENOUS at 08:13

## 2022-05-26 RX ADMIN — PANCRELIPASE 6000 UNITS: 30000; 6000; 19000 CAPSULE, DELAYED RELEASE PELLETS ORAL at 08:13

## 2022-05-26 RX ADMIN — LEVETIRACETAM 1000 MG: 500 TABLET, FILM COATED ORAL at 08:13

## 2022-05-26 RX ADMIN — PANCRELIPASE 6000 UNITS: 30000; 6000; 19000 CAPSULE, DELAYED RELEASE PELLETS ORAL at 12:03

## 2022-05-26 RX ADMIN — CALCIUM 1 TABLET: 500 TABLET ORAL at 08:14

## 2022-05-26 NOTE — TELEPHONE ENCOUNTER
----- [facility] sent at 5/26/2022  8:43 AM EDT -----  Patient needs hfu in 2 weeks please

## 2022-05-26 NOTE — ASSESSMENT & PLAN NOTE
Felt to be etiology of hyponatremia  Initiated on Cortef therapy, continue on discharge  Referral to endocrinology placed

## 2022-05-26 NOTE — PROGRESS NOTES
Progress Note - Nephrology   [facility] [place] 55 y o  male MRN: [id number]  Unit/Bed#: 563-24 Encounter: [id number]    Assessment and Plan    1  Hyponatremia   Improving  Discussed with patient the importance of continuation of hydrocortisone and risks if this medication is abruptly discontinued  Emphasized the need for close follow-up with Nephrology and Endocrinology  He should continue 2 g sodium restriction and keep to a fluid restriction of 2 L  Patient will need BMP within 1 week followed by outpatient nephrology follow-up  2  Adrenal insufficiency   Patient to be discharged on oral hydrocortisone  Reminded patient of need for follow-up with Endocrinology  3  Multiple electrolyte abnormalities   Encourage cessation of alcohol consumption, laboratory monitoring, improved nutrition with supplementation as indicated  4  COVID positive  5   Alcoholism   Cessation    Follow up reason for today's visit:     Hyponatremia    Patient Active Problem List   Diagnosis    Tobacco abuse    Essential hypertension    H/O suicide attempt    H/O cervical spine surgery    Hyponatremia    Dietary folate deficiency anemia    Ventral hernia without obstruction or gangrene    Hepatomegaly    Hepatic steatosis    Hypomagnesemia    Elevated alkaline phosphatase level    Alcoholic hepatitis without ascites    Vitamin D deficiency    Iron deficiency    Urinary retention    Bladder wall thickening    Hypophosphatemia    Generalized weakness    Malnutrition of moderate degree (HCC)    Hypokalemia    Continuous chronic alcoholism (HCC)    Incisional hernia    Hx of seizure disorder    Seizure-like activity (HCC)    Diarrhea    Abnormality of pancreatic duct    Allergic rhinitis    Microcytic anemia    Abdominal wound dehiscence    Cervical disc disorder with myelopathy    Cervical spinal stenosis    Depression    Left foot drop    Lumbar radiculopathy    Neuropathy involving both lower extremities  Peripheral neuropathy    T6 vertebral fracture (HCC)    Alcoholic cirrhosis of liver without ascites (HCC)    Thrombocytopenia (HCC)    Closed fracture of left olecranon process, initial encounter    Iron deficiency anemia due to chronic blood loss    Duodenal ulcer    Tubular adenoma    Traore esophagus    Celiac artery stenosis (HCC)    Pancreatic mass    Pancytopenia (HCC)    Constipation    Transaminitis    Lesion of spleen    Left anterior fascicular block    Abnormal EKG    Acute on chronic pancreatitis (HCC)    Pancreatic pseudocyst    COPD (chronic obstructive pulmonary disease) (HCC)    Ileus ([address] )    Ambulatory dysfunction    Current every day smoker    Fall    Hx of duodenal ulcer    Neck pain    Alcohol use    Thoracic compression fracture (HCC)    Aortic ectasia (HCC)    Rib fractures    Seizure disorder (HCC)    Hypoxia    Traore's esophagus    Elevated d-dimer    COVID-19 virus infection    Hypocalcemia    Clavicular fracture    Cortisol deficiency (HCC)    Chronic anemia         Subjective:   Patient denies physical complaints  He feels ready for discharge  A complete 10 point review of systems was performed and is otherwise negative  Objective:     Vitals: Blood pressure 133/75, pulse 80, temperature 98 °F (36 7 °C), temperature source Oral, resp  rate 19, height 5' 6" (1 676 m), weight 55 3 kg (122 lb), SpO2 96 %  ,Body mass index is 19 69 kg/m²  Weight (last 2 days)     None            Intake/Output Summary (Last 24 hours) at 5/26/2022 1402  Last data filed at 5/26/2022 1200  Gross per 24 hour   Intake 420 ml   Output 2475 ml   Net -2055 ml     I/O last 3 completed shifts: In: 360 [P O :360]  Out: 2800 [Urine:2800]         Physical Exam: /75   Pulse 80   Temp 98 °F (36 7 °C) (Oral)   Resp 19   Ht 5' 6" (1 676 m)   Wt 55 3 kg (122 lb)   SpO2 96%   BMI 19 69 kg/m²     General Appearance:    No acute distress  Cooperative   Appears stated age   Thin appearing  Head:    Normocephalic  Atraumatic  Normal jaw occlusion  Eyes:    Lids, conjunctiva normal  No scleral icterus  Ears:    Normal external ears  Nose:   Nares normal  No drainage  Mouth:   Lips, tongue normal  Mucosa normal  Phonation normal    Neck:   Supple  Symmetrical    Back:     Symmetric  No CVA tenderness  Lungs:     Normal respiratory effort  Clear to auscultation bilaterally  Chest wall:    No tenderness or deformity  Heart:    Regular rate and rhythm  Normal S1 and S2  No murmur  No JVD  No edema  Abdomen:     Soft  Non-tender  Bowel sounds active  Genitourinary:   No Tabares catheter present  Extremities:   Extremities normal  Atraumatic  No cyanosis  Skin:   Warm and dry  No pallor, jaundice, rash, ecchymoses  Neurologic:   Alert and oriented to person, place, time  No focal deficit  Lab, Imaging and other studies: I have personally reviewed pertinent labs  CBC:   Lab Results   Component Value Date    WBC 6 46 05/26/2022    HGB 7 1 (L) 05/26/2022    HCT 23 3 (L) 05/26/2022    MCV 72 (L) 05/26/2022     05/26/2022    MCH 21 9 (L) 05/26/2022    MCHC 30 5 (L) 05/26/2022    RDW 27 8 (H) 05/26/2022    MPV 11 0 05/26/2022    NRBC 0 05/26/2022     CMP:   Lab Results   Component Value Date    K 2 6 (LL) 05/26/2022    CL 92 (L) 05/26/2022    CO2 30 05/26/2022    BUN 15 05/26/2022    CREATININE 0 71 05/26/2022    CALCIUM 7 4 (L) 05/26/2022    AST 80 (H) 05/26/2022    ALT 37 05/26/2022    ALKPHOS 160 (H) 05/26/2022    EGFR 105 05/26/2022           Results from last 7 days   Lab Units 05/26/22  0441 05/25/22  0438 05/24/22  0506 05/23/22  0507   POTASSIUM mmol/L 2 6* 4 0 3 2* 3 5   CHLORIDE mmol/L 92* 92* 87* 88*   CO2 mmol/L 30 28 30 28   BUN mg/dL 15 15 11 8   CREATININE mg/dL 0 71 0 75 0 82 0 69   CALCIUM mg/dL 7 4* 7 8* 6 8* 7 3*   ALK PHOS U/L 160*  --  121* 126*   ALT U/L 37  --  30 32   AST U/L 80*  --  47* 58*         Phosphorus: No results found for: PHOS  Magnesium:   Lab Results   Component Value Date    MG 1 8 05/26/2022     Urinalysis: No results found for: [place], SPECGRAV, PHUR, LEUKOCYTESUR, NITRITE, PROTEINUA, GLUCOSEU, KETONESU, BILIRUBINUR, BLOODU  Ionized Calcium: No results found for: CAION  Coagulation: No results found for: PT, INR, APTT  Troponin: No results found for: TROPONINI  ABG: No results found for: PHART, AMZ1QSX, PO2ART, QJT1ZNL, K5JPQIYL, BEART, SOURCE  Radiology review:     IMAGING  Procedure: XR chest pa & lateral    Result Date: 5/24/2022  Narrative: CHEST INDICATION:   VQ scan  Covid 19 positive COMPARISON:  Chest x-ray May 20 EXAM PERFORMED/VIEWS:  XR CHEST PA & LATERAL Images: 3 FINDINGS: Developing interstitial opacities are demonstrated at the level of the upper lobes  Heart size is normal  Trachea is midline  Patient is status post cervical fusion and right humerus fixation  Old fractures of the thoracic spine are again seen  Impression: Developing groundglass opacities in the upper lobes  The study was marked in [facility] for immediate notification  Workstation performed: [id number]     Procedure: NM lung perfusion imaging    Result Date: 5/23/2022  Narrative: LUNG PERFUSION SCAN INDICATION: Hypoxia, COVID-19, elevated D-dimer COMPARISON:  Chest x-ray dated 5/23/2022 TECHNIQUE:  Multiplanar perfusion imaging was performed following the intravenous administration of 4 2 mCi Tc-99m labeled MAA  No ventilation imaging was performed as per current Covid-19 protocol  FINDINGS:  Perfusion imaging demonstrates nonuniform perfusion involving both lungs  Best seen on anterior, posterior, and right lateral spot images is a moderate-sized wedge-shaped perfusion defect involving the lateral aspect of the right midlung zone corresponding to airspace disease seen on chest x-ray   Best seen on RPO image is a large side segmental perfusion defect involving the posterior right lung base which is likely present on lateral spot image with preserved tracer activity to the margin of the right posterior lung base not excluded on lateral image  There is a wedge-shaped perfusion defect involving the posterior left lung base on left lateral spot image but without definite corresponding abnormality on additional images, therefore most likely artifactual in nature  No additional moderate or large perfusion defects are noted  Impression: Finding is most consistent with intermediate probability for acute pulmonary artery embolus with moderate to large perfusion defects involving the right lung as detailed above  Workstation performed: [id number]     Procedure: CTA chest pe study    Result Date: 5/23/2022  Narrative: CT CHEST WITHOUT IV CONTRAST INDICATION:   Pulmonary embolism (PE) suspected, positive D-dimer COVID positive, acute hypoxia, indeterminate V/Q scan, rule out PE  Patient has confirmed COVID-19  Positive on 5/20/2022  Recent fall  COMPARISON:  Chest CT from 5/20/2022 and 10/30/2021, chest radiograph from 5/23/2022  TECHNIQUE: Chest CT without intravenous contrast   Axial, sagittal, coronal 2D reformats and coronal MIPS from source data  Radiation dose length product (DLP):  361 06 mGy-cm   Radiation dose exposure minimized using iterative reconstruction and automated exposure control  FINDINGS: LUNGS:  Moderate bilateral peripheral groundglass opacity  AIRWAYS: No significant filling defects  PLEURA:  Trace effusions  HEART/GREAT VESSELS:  Normal heart size  Moderate coronary artery calcification indicating atherosclerotic heart disease  Calcification of the aortic valve leaflets which can contribute to aortic stenosis  MEDIASTINUM AND MANE:  Unremarkable  CHEST WALL AND LOWER NECK: Unremarkable  UPPER ABDOMEN:  Redemonstration of 3 7 cm pseudocyst in the pancreatic head with surrounding calcification, present since at least November 2020  Cholecystectomy    Mild fullness of the right renal collecting system OSSEOUS STRUCTURES: Acute fracture of the medial right clavicle  Mild degenerative disease in the spine with mild kyphosis  Cervicothoracic fusion  ORIF of the right humerus  Old healed spinous process fractures in the upper and mid thoracic spine  Healed bilateral rib fractures  Redemonstration of multiple compression deformities in the upper and mid thoracic spine  Impression: No pulmonary embolus  Acute mildly comminuted fracture of the medial right clavicle  Mild diffuse bilateral peripheral groundglass opacity due to Covid-19  New trace pleural effusions  This study was marked for significant notification and follow-up    Workstation performed: [id number]       Current Facility-Administered Medications   Medication Dose Route Frequency    albuterol inhalation solution 2 5 mg  2 5 mg Nebulization Q6H PRN    baricitinib (OLUMIANT) tablet 4 mg  4 mg Oral Q24H    calcium carbonate (OYSTER SHELL,OSCAL) 500 mg tablet 1 tablet  1 tablet Oral Daily With Breakfast    cholecalciferol (VITAMIN D3) tablet 2,000 Units  2,000 Units Oral Daily    cyanocobalamin (VITAMIN B-12) tablet 100 mcg  100 mcg Oral Daily    docusate sodium (COLACE) capsule 100 mg  100 mg Oral BID PRN    enoxaparin (LOVENOX) subcutaneous injection 40 mg  40 mg Subcutaneous Z00P KIMBERLY    folic acid (FOLVITE) tablet 1 mg  1 mg Oral Daily    hydrocortisone (CORTEF) tablet 50 mg  50 mg Oral BID    HYDROmorphone (DILAUDID) injection 1 mg  1 mg Intravenous Q4H PRN    Or    oxyCODONE (ROXICODONE) immediate release tablet 10 mg  10 mg Oral Q4H PRN    hydrOXYzine HCL (ATARAX) tablet 25 mg  25 mg Oral Q6H PRN    levETIRAcetam (KEPPRA) tablet 1,000 mg  1,000 mg Oral Q12H [facility]    multivitamin-minerals (CENTRUM) tablet 1 tablet  1 tablet Oral Daily    ondansetron (ZOFRAN) injection 4 mg  4 mg Intravenous Q6H PRN    pancrelipase (Lip-Prot-Amyl) (CREON) delayed release capsule 6,000 Units  6,000 Units Oral TID With Meals    pantoprazole (PROTONIX) EC tablet 40 mg  40 mg Oral Early Morning    polyethylene glycol (MIRALAX) packet 17 g  17 g Oral Daily PRN    potassium chloride (K-DUR,KLOR-CON) CR tablet 40 mEq  40 mEq Oral BID    thiamine tablet 100 mg  100 mg Oral Daily     Medications Discontinued During This Encounter   Medication Reason    sodium chloride 0 9 % infusion     oxyCODONE (ROXICODONE) IR tablet 5 mg     HYDROmorphone (DILAUDID) injection 0 5 mg     cholecalciferol (VITAMIN D3) tablet 1,000 Units     nicotine (NICODERM CQ) 14 mg/24hr TD 24 hr patch 1 patch     dexamethasone (DECADRON) injection 6 mg     heparin (porcine) subcutaneous injection 5,000 Units     methylPREDNISolone sodium succinate (Solu-MEDROL) injection 40 mg     enoxaparin (LOVENOX) subcutaneous injection 60 mg     folic acid 1 mg, thiamine (VITAMIN B1) 100 mg in sodium chloride 0 9 % 100 mL IV piggyback     levalbuterol (XOPENEX) inhalation solution 1 25 mg     ipratropium (ATROVENT) 0 02 % inhalation solution 0 5 mg     magnesium sulfate 4 g/100 mL IVPB (premix) 4 g Availability    hydrocortisone (CORTEF) 10 mg tablet Reorder       Isrrael Cuevas PA-C    Portions of the record may have been created with voice recognition software  Occasional wrong word or "sound a like" substitutions may have occurred due to the inherent limitations of voice recognition software  Read the chart carefully and recognize, using context, where substitutions have occurred

## 2022-05-26 NOTE — ASSESSMENT & PLAN NOTE
· Aggressive replacement prior to discharge  · Continue potassium supplementation and lisinopril  · Repeat BMP in 5-7 days

## 2022-05-26 NOTE — QUICK NOTE
Reviewed results of overnight pulse ox  Per my interpretation, patient requires 2L NC to use during hours of sleep  Case management has made arrangements to set home O2 up at patient's home  After review of the chart today and brief discussion with SLIM, patient remains stable for discharge from pulmonary standpoint  Follow-up in pulm office made for 6/10  Call with questions

## 2022-05-26 NOTE — CASE MANAGEMENT
Case Management Discharge Planning Note    Patient name Berna Martínez  Location [address] [id number] MRN [id number]  : 1965 Date 2022       Current Admission Date: 2022  Current Admission Diagnosis:Hyponatremia   Patient Active Problem List    Diagnosis Date Noted    Chronic anemia 2022    Clavicular fracture 2022    Cortisol deficiency ([address] ) 2022    Hypocalcemia 2022    Elevated d-dimer 2022    COVID-19 virus infection 2022    Alcohol use 2022    Thoracic compression fracture ([address] ) 2022    Aortic ectasia ([address] ) 2022    Rib fractures 2022    Seizure disorder ([address] ) 2022    Hypoxia 2022    Traore's esophagus 2022    Ambulatory dysfunction 2021    Current every day smoker 2021    Fall 2021    Hx of duodenal ulcer 2021    Neck pain 2021    Acute on chronic pancreatitis ([address] ) 2020    Pancreatic pseudocyst 2020    COPD (chronic obstructive pulmonary disease) ([address] ) 2020    Ileus ([address] ) 2020    Abnormal EKG 2020    Lesion of spleen 2020    Left anterior fascicular block 2020    Constipation 2020    Transaminitis 2020    Celiac artery stenosis ([address] ) 2020    Pancreatic mass 2020    Pancytopenia ([address] ) 2020    Traore esophagus     Duodenal ulcer 2019    Tubular adenoma 2019    Iron deficiency anemia due to chronic blood loss 10/22/2019    Closed fracture of left olecranon process, initial encounter 2019    Thrombocytopenia ([address] )     Alcoholic cirrhosis of liver without ascites ([address] ) 2018    Seizure-like activity ([address] ) 2018    Diarrhea 2018    Abnormality of pancreatic duct 2017    Hx of seizure disorder 10/12/2017    Incisional hernia 10/08/2017    Continuous chronic alcoholism ([facility] ) 10/05/2017    T6 vertebral fracture ([facility] ) 2017    Neuropathy involving both lower extremities 08/15/2017    Hypokalemia 07/29/2017    Malnutrition of moderate degree (HCC) 05/18/2017    Generalized weakness 05/17/2017    Bladder wall thickening 03/09/2017    Hypophosphatemia 03/09/2017    Urinary retention 71/13/5072    Alcoholic hepatitis without ascites 03/07/2017    Vitamin D deficiency 03/07/2017    Iron deficiency 03/07/2017    Depression 02/08/2017    Elevated alkaline phosphatase level 01/14/2017    Hepatomegaly 01/12/2017    Hepatic steatosis 01/12/2017    Hypomagnesemia 01/12/2017    Hyponatremia 01/11/2017    Dietary folate deficiency anemia 01/11/2017    Ventral hernia without obstruction or gangrene 01/11/2017    Abdominal wound dehiscence 12/15/2016    Microcytic anemia 06/20/2016    Allergic rhinitis 06/07/2016    Tobacco abuse 05/22/2016    Essential hypertension 05/22/2016    H/O suicide attempt 05/22/2016    H/O cervical spine surgery 05/22/2016    Left foot drop 10/31/2014    Peripheral neuropathy 10/31/2014    Cervical disc disorder with myelopathy 09/25/2014    Lumbar radiculopathy 09/25/2014    Cervical spinal stenosis 07/30/2014      LOS (days): 6  Geometric Mean LOS (GMLOS) (days): 5 40  Days to GMLOS:-0 6     OBJECTIVE:  Risk of Unplanned Readmission Score: 20 77         Current admission status: Inpatient   Preferred Pharmacy:   [facility] [place] - [address] [address]  Phone: [phone] Fax: [phone]    Primary Care Provider: LORA Davila    Primary Insurance: [facility]  Secondary Insurance:     DISCHARGE DETAILS:  Pt states he will be going to his brother Jaya house on [address] [place] [address] (in between [address])  CM notified [employer] via [place]

## 2022-05-26 NOTE — PLAN OF CARE
Problem: Nutrition/Hydration-ADULT  Goal: Nutrient/Hydration intake appropriate for improving, restoring or maintaining nutritional needs  Description: Monitor and assess patient's nutrition/hydration status for malnutrition  Collaborate with interdisciplinary team and initiate plan and interventions as ordered  Monitor patient's weight and dietary intake as ordered or per policy  Utilize nutrition screening tool and intervene as necessary  Determine patient's food preferences and provide high-protein, high-caloric foods as appropriate       INTERVENTIONS:  - Monitor oral intake, urinary output, labs, and treatment plans  - Assess nutrition and hydration status and recommend course of action  - Evaluate amount of meals eaten  - Assist patient with eating if necessary   - Allow adequate time for meals  - Recommend/ encourage appropriate diets, oral nutritional supplements, and vitamin/mineral supplements  - Order, calculate, and assess calorie counts as needed  - Recommend, monitor, and adjust tube feedings and TPN/PPN based on assessed needs  - Assess need for intravenous fluids  - Provide specific nutrition/hydration education as appropriate  - Include patient/family/caregiver in decisions related to nutrition  Outcome: Progressing     Problem: MOBILITY - ADULT  Goal: Maintain or return to baseline ADL function  Description: INTERVENTIONS:  -  Assess patient's ability to carry out ADLs; assess patient's baseline for ADL function and identify physical deficits which impact ability to perform ADLs (bathing, care of mouth/teeth, toileting, grooming, dressing, etc )  - Assess/evaluate cause of self-care deficits   - Assess range of motion  - Assess patient's mobility; develop plan if impaired  - Assess patient's need for assistive devices and provide as appropriate  - Encourage maximum independence but intervene and supervise when necessary  - Involve family in performance of ADLs  - Assess for home care needs following discharge   - Consider OT consult to assist with ADL evaluation and planning for discharge  - Provide patient education as appropriate  Outcome: Progressing  Goal: Maintains/Returns to pre admission functional level  Description: INTERVENTIONS:  - Perform BMAT or MOVE assessment daily    - Set and communicate daily mobility goal to care team and patient/family/caregiver  - Collaborate with rehabilitation services on mobility goals if consulted  - Perform Range of Motion 4 times a day  - Reposition patient every 2 hours    - Dangle patient 3 times a day  - Stand patient 3 times a day  - Ambulate patient 3 times a day  - Out of bed to chair 3 times a day   - Out of bed for meals 3 times a day  - Out of bed for toileting  - Record patient progress and toleration of activity level   Outcome: Progressing     Problem: Potential for Falls  Goal: Patient will remain free of falls  Description: INTERVENTIONS:  - Educate patient/family on patient safety including physical limitations  - Instruct patient to call for assistance with activity   - Consult OT/PT to assist with strengthening/mobility   - Keep Call bell within reach  - Keep bed low and locked with side rails adjusted as appropriate  - Keep care items and personal belongings within reach  - Initiate and maintain comfort rounds  - Make Fall Risk Sign visible to staff  - Offer Toileting every 2 Hours, in advance of need  - Initiate/Maintain fall alarm  - Obtain necessary fall risk management equipment: alarm, nonskid socks, yellow bracelet  - Apply yellow socks and bracelet for high fall risk patients  - Consider moving patient to room near nurses station  Outcome: Progressing     Problem: PAIN - ADULT  Goal: Verbalizes/displays adequate comfort level or baseline comfort level  Description: Interventions:  - Encourage patient to monitor pain and request assistance  - Assess pain using appropriate pain scale  - Administer analgesics based on type and severity of pain and evaluate response  - Implement non-pharmacological measures as appropriate and evaluate response  - Consider cultural and social influences on pain and pain management  - Notify physician/advanced practitioner if interventions unsuccessful or patient reports new pain  Outcome: Progressing     Problem: INFECTION - ADULT  Goal: Absence or prevention of progression during hospitalization  Description: INTERVENTIONS:  - Assess and monitor for signs and symptoms of infection  - Monitor lab/diagnostic results  - Monitor all insertion sites, i e  indwelling lines, tubes, and drains  - Monitor endotracheal if appropriate and nasal secretions for changes in amount and color  - [place] appropriate cooling/warming therapies per order  - Administer medications as ordered  - Instruct and encourage patient and family to use good hand hygiene technique  - Identify and instruct in appropriate isolation precautions for identified infection/condition  Outcome: Progressing     Problem: SAFETY ADULT  Goal: Maintain or return to baseline ADL function  Description: INTERVENTIONS:  -  Assess patient's ability to carry out ADLs; assess patient's baseline for ADL function and identify physical deficits which impact ability to perform ADLs (bathing, care of mouth/teeth, toileting, grooming, dressing, etc )  - Assess/evaluate cause of self-care deficits   - Assess range of motion  - Assess patient's mobility; develop plan if impaired  - Assess patient's need for assistive devices and provide as appropriate  - Encourage maximum independence but intervene and supervise when necessary  - Involve family in performance of ADLs  - Assess for home care needs following discharge   - Consider OT consult to assist with ADL evaluation and planning for discharge  - Provide patient education as appropriate  Outcome: Progressing  Goal: Maintains/Returns to pre admission functional level  Description: INTERVENTIONS:  - Perform BMAT or MOVE assessment daily    - Set and communicate daily mobility goal to care team and patient/family/caregiver  - Collaborate with rehabilitation services on mobility goals if consulted  - Perform Range of Motion 4 times a day  - Reposition patient every 2 hours    - Dangle patient 3 times a day  - Stand patient 3 times a day  - Ambulate patient 3 times a day  - Out of bed to chair 3 times a day   - Out of bed for meals 3 times a day  - Out of bed for toileting  - Record patient progress and toleration of activity level   Outcome: Progressing  Goal: Patient will remain free of falls  Description: INTERVENTIONS:  - Educate patient/family on patient safety including physical limitations  - Instruct patient to call for assistance with activity   - Consult OT/PT to assist with strengthening/mobility   - Keep Call bell within reach  - Keep bed low and locked with side rails adjusted as appropriate  - Keep care items and personal belongings within reach  - Initiate and maintain comfort rounds  - Make Fall Risk Sign visible to staff  - Offer Toileting every 2 Hours, in advance of need  - Initiate/Maintain fall alarm  - Obtain necessary fall risk management equipment: alarm, nonskid socks, yellow bracelet  - Apply yellow socks and bracelet for high fall risk patients  - Consider moving patient to room near nurses station  Outcome: Progressing     Problem: DISCHARGE PLANNING  Goal: Discharge to home or other facility with appropriate resources  Description: INTERVENTIONS:  - Identify barriers to discharge w/patient and caregiver  - Arrange for needed discharge resources and transportation as appropriate  - Identify discharge learning needs (meds, wound care, etc )  - Arrange for interpretive services to assist at discharge as needed  - Refer to Case Management Department for coordinating discharge planning if the patient needs post-hospital services based on physician/advanced practitioner order or complex needs related to functional status, cognitive ability, or social support system  Outcome: Progressing     Problem: Knowledge Deficit  Goal: Patient/family/caregiver demonstrates understanding of disease process, treatment plan, medications, and discharge instructions  Description: Complete learning assessment and assess knowledge base    Interventions:  - Provide teaching at level of understanding  - Provide teaching via preferred learning methods  Outcome: Progressing     Problem: RESPIRATORY - ADULT  Goal: Achieves optimal ventilation and oxygenation  Description: INTERVENTIONS:  - Assess for changes in respiratory status  - Assess for changes in mentation and behavior  - Position to facilitate oxygenation and minimize respiratory effort  - Oxygen administered by appropriate delivery if ordered  - Initiate smoking cessation education as indicated  - Encourage broncho-pulmonary hygiene including cough, deep breathe, Incentive Spirometry  - Assess the need for suctioning and aspirate as needed  - Assess and instruct to report SOB or any respiratory difficulty  - Respiratory Therapy support as indicated  Outcome: Progressing     Problem: GENITOURINARY - ADULT  Goal: Maintains or returns to baseline urinary function  Description: INTERVENTIONS:  - Assess urinary function  - Encourage oral fluids to ensure adequate hydration if ordered  - Administer IV fluids as ordered to ensure adequate hydration  - Administer ordered medications as needed  - Offer frequent toileting  - Follow urinary retention protocol if ordered  Outcome: Progressing  Goal: Absence of urinary retention  Description: INTERVENTIONS:  - Assess patients ability to void and empty bladder  - Monitor I/O  - Bladder scan as needed  - Discuss with physician/AP medications to alleviate retention as needed  - Discuss catheterization for long term situations as appropriate  Outcome: Progressing     Problem: METABOLIC, FLUID AND ELECTROLYTES - ADULT  Goal: Electrolytes maintained within normal limits  Description: INTERVENTIONS:  - Monitor labs and assess patient for signs and symptoms of electrolyte imbalances  - Administer electrolyte replacement as ordered  - Monitor response to electrolyte replacements, including repeat lab results as appropriate  - Instruct patient on fluid and nutrition as appropriate  Outcome: Progressing  Goal: Fluid balance maintained  Description: INTERVENTIONS:  - Monitor labs   - Monitor I/O and WT  - Instruct patient on fluid and nutrition as appropriate  - Assess for signs & symptoms of volume excess or deficit  Outcome: Progressing     Problem: MUSCULOSKELETAL - ADULT  Goal: Maintain or return mobility to safest level of function  Description: INTERVENTIONS:  - Assess patient's ability to carry out ADLs; assess patient's baseline for ADL function and identify physical deficits which impact ability to perform ADLs (bathing, care of mouth/teeth, toileting, grooming, dressing, etc )  - Assess/evaluate cause of self-care deficits   - Assess range of motion  - Assess patient's mobility  - Assess patient's need for assistive devices and provide as appropriate  - Encourage maximum independence but intervene and supervise when necessary  - Involve family in performance of ADLs  - Assess for home care needs following discharge   - Consider OT consult to assist with ADL evaluation and planning for discharge  - Provide patient education as appropriate  Outcome: Progressing  Goal: Maintain proper alignment of affected body part  Description: INTERVENTIONS:  - Support, maintain and protect limb and body alignment  - Provide patient/ family with appropriate education  Outcome: Progressing

## 2022-05-26 NOTE — ASSESSMENT & PLAN NOTE
· Likely due to liver disease  · Platelet count normalized   · Follow CBC outpatient       Results from last 7 days   Lab Units 05/26/22  0441 05/24/22  0506 05/23/22  0507   WBC Thousand/uL 6 46 8 90 8 46   HEMOGLOBIN g/dL 7 1* 7 2* 7 2*   HEMATOCRIT % 23 3* 22 7* 23 1*   PLATELETS Thousands/uL 165 139* 119*

## 2022-05-26 NOTE — ASSESSMENT & PLAN NOTE
· Multiple compression fractures  · Needs an outpatient DEXA scan with his PCP  · PT/OT  · Outpatient Orthopedic Spine Surgery evaluation  · Will discharge on short course oxycodone, warned against any other substance use with significantly increased risk of overdose    CT scan of the chest/abdomen/pelvis (05/20/2022): IMPRESSION:     1  Chronic compression fractures of T2, T3, T6 and T7      2   Mild compression fractures of T8 4 and T8, age indeterminant, although developing since 10/30/2021

## 2022-05-26 NOTE — ASSESSMENT & PLAN NOTE
· Symptomatic hyponatremia with lightheadedness, dizziness, a visual disturbance, nausea, and gait instability resulting in multiple falls  · Chronic hyponatremia with a baseline sodium level of 129-130 mmol/L  · Improved after initiation of Cortef with sodium of 128 mmol/L this morning  · Appears multifactorial underlying cortisol deficiency   · Continue Cortef on discharge     Results from last 7 days   Lab Units 05/26/22  0441 05/25/22  0438 05/24/22  0506   SODIUM mmol/L 130* 128* 123*   POTASSIUM mmol/L 2 6* 4 0 3 2*   CHLORIDE mmol/L 92* 92* 87*   CO2 mmol/L 30 28 30   BUN mg/dL 15 15 11   CREATININE mg/dL 0 71 0 75 0 82   CALCIUM mg/dL 7 4* 7 8* 6 8*      Latest Reference Range & Units 05/21/22 06:22   TSH 3RD GENERATON 0 450 - 4 500 uIU/mL 1 445 [1]   [1] Adult TSH (3rd generation) reference range follows the recommended guidelines of the [employer], January, 2020

## 2022-05-26 NOTE — ASSESSMENT & PLAN NOTE
No evidence of bleeding with hemoglobin stable around 7  Monitor CBC transfuse for hemoglobin less than 7  Continue vitamin B38 and folic acid supplementation

## 2022-05-26 NOTE — ASSESSMENT & PLAN NOTE
· Right-sided rib fractures of the 9th rib and 10th rib  · Rib fracture protocol  · Pain control - discharged on oxycodone for 3 additional days, 1 regarding risk of overdose if combined with other substances including alcohol    CT scan of the chest/abdomen/pelvis (05/20/2022): Several healed right-sided rib fractures  Recent appearing fractures of the posterior right 9th and 10th ribs at their costovertebral junctions

## 2022-05-26 NOTE — NURSING NOTE
AVS printed and reviewed with patient  Patient verbalized understanding    No home care ordered on dc

## 2022-05-26 NOTE — ASSESSMENT & PLAN NOTE
· Due the COVID-19 virus infection and rib fractures, with nocturnal hypoxia concerning for sleep apnea  · Continues to require up to 4 L overnight, no oxygen requirements during the day, also has been refusing supplemental oxygen intermittently  · Qualifies for oxygen overnight based on pulse oximetry  · Will need an outpatient sleep study, referral to Sleep Medicine placed

## 2022-05-26 NOTE — ASSESSMENT & PLAN NOTE
· Outpatient surveillance imaging with Gastroenterology      CT scan of the chest/abdomen/pelvis (05/20/2022):  PANCREAS:  Limited evaluation without IV contrast   Calcifications in the pancreatic head and proximal body, indicative of chronic pancreatitis  3 0 x 3 2 cm cyst in the pancreatic head, measuring 3 4 x 3 9 cm on the CT from 10/30/2021

## 2022-05-26 NOTE — ASSESSMENT & PLAN NOTE
· Initially requiered 3 lpm of continuous supplemental oxygen to maintain oxygen saturations of 90% and above  · Overnight from 05/21/2022-05/22/2022 developed worsening hypoxia and required up to 5 lpm O2  · Changed to the "Moderate" COVID-19 treatment algorithm on 05/22/2022  · Patient was noted for intermittent refusal of supplemental oxygen, he mostly maintains adequate oxygen saturations on room air but continues to require oxygen overnight  · Completed a 5 day course of Remdesivir, continue baricitinb   · Antibiotics are not indicated with procalcitonin remained in normal x2  · Steroid therapy now changed to Cortef due to cortisol deficiency - continue upon discharge  · D-dimer was elevated, however no evidence of DVT or PE on imaging

## 2022-05-26 NOTE — ASSESSMENT & PLAN NOTE
· Resolved with aggressive replacement     Results from last 7 days   Lab Units 05/26/22  0441 05/25/22  0438 05/24/22  0506   MAGNESIUM mg/dL 1 8 1 5* 1 2*

## 2022-05-26 NOTE — PLAN OF CARE
Problem: Nutrition/Hydration-ADULT  Goal: Nutrient/Hydration intake appropriate for improving, restoring or maintaining nutritional needs  Description: Monitor and assess patient's nutrition/hydration status for malnutrition  Collaborate with interdisciplinary team and initiate plan and interventions as ordered  Monitor patient's weight and dietary intake as ordered or per policy  Utilize nutrition screening tool and intervene as necessary  Determine patient's food preferences and provide high-protein, high-caloric foods as appropriate       INTERVENTIONS:  - Monitor oral intake, urinary output, labs, and treatment plans  - Assess nutrition and hydration status and recommend course of action  - Evaluate amount of meals eaten  - Assist patient with eating if necessary   - Allow adequate time for meals  - Recommend/ encourage appropriate diets, oral nutritional supplements, and vitamin/mineral supplements  - Order, calculate, and assess calorie counts as needed  - Recommend, monitor, and adjust tube feedings and TPN/PPN based on assessed needs  - Assess need for intravenous fluids  - Provide specific nutrition/hydration education as appropriate  - Include patient/family/caregiver in decisions related to nutrition  Outcome: Progressing     Problem: MOBILITY - ADULT  Goal: Maintain or return to baseline ADL function  Description: INTERVENTIONS:  -  Assess patient's ability to carry out ADLs; assess patient's baseline for ADL function and identify physical deficits which impact ability to perform ADLs (bathing, care of mouth/teeth, toileting, grooming, dressing, etc )  - Assess/evaluate cause of self-care deficits   - Assess range of motion  - Assess patient's mobility; develop plan if impaired  - Assess patient's need for assistive devices and provide as appropriate  - Encourage maximum independence but intervene and supervise when necessary  - Involve family in performance of ADLs  - Assess for home care needs following discharge   - Consider OT consult to assist with ADL evaluation and planning for discharge  - Provide patient education as appropriate  Outcome: Progressing  Goal: Maintains/Returns to pre admission functional level  Description: INTERVENTIONS:  - Perform BMAT or MOVE assessment daily    - Set and communicate daily mobility goal to care team and patient/family/caregiver  - Collaborate with rehabilitation services on mobility goals if consulted  - Perform Range of Motion 4 times a day  - Reposition patient every 2 hours    - Dangle patient 3 times a day  - Stand patient 3 times a day  - Ambulate patient 3 times a day  - Out of bed to chair 3 times a day   - Out of bed for meals 3 times a day  - Out of bed for toileting  - Record patient progress and toleration of activity level   Outcome: Progressing     Problem: Potential for Falls  Goal: Patient will remain free of falls  Description: INTERVENTIONS:  - Educate patient/family on patient safety including physical limitations  - Instruct patient to call for assistance with activity   - Consult OT/PT to assist with strengthening/mobility   - Keep Call bell within reach  - Keep bed low and locked with side rails adjusted as appropriate  - Keep care items and personal belongings within reach  - Initiate and maintain comfort rounds  - Make Fall Risk Sign visible to staff  - Offer Toileting every 2 Hours, in advance of need  - Initiate/Maintain fall alarm  - Obtain necessary fall risk management equipment: alarm, nonskid socks, yellow bracelet  - Apply yellow socks and bracelet for high fall risk patients  - Consider moving patient to room near nurses station  Outcome: Progressing     Problem: PAIN - ADULT  Goal: Verbalizes/displays adequate comfort level or baseline comfort level  Description: Interventions:  - Encourage patient to monitor pain and request assistance  - Assess pain using appropriate pain scale  - Administer analgesics based on type and severity of pain and evaluate response  - Implement non-pharmacological measures as appropriate and evaluate response  - Consider cultural and social influences on pain and pain management  - Notify physician/advanced practitioner if interventions unsuccessful or patient reports new pain  Outcome: Progressing     Problem: INFECTION - ADULT  Goal: Absence or prevention of progression during hospitalization  Description: INTERVENTIONS:  - Assess and monitor for signs and symptoms of infection  - Monitor lab/diagnostic results  - Monitor all insertion sites, i e  indwelling lines, tubes, and drains  - Monitor endotracheal if appropriate and nasal secretions for changes in amount and color  - [place] appropriate cooling/warming therapies per order  - Administer medications as ordered  - Instruct and encourage patient and family to use good hand hygiene technique  - Identify and instruct in appropriate isolation precautions for identified infection/condition  Outcome: Progressing     Problem: SAFETY ADULT  Goal: Maintain or return to baseline ADL function  Description: INTERVENTIONS:  -  Assess patient's ability to carry out ADLs; assess patient's baseline for ADL function and identify physical deficits which impact ability to perform ADLs (bathing, care of mouth/teeth, toileting, grooming, dressing, etc )  - Assess/evaluate cause of self-care deficits   - Assess range of motion  - Assess patient's mobility; develop plan if impaired  - Assess patient's need for assistive devices and provide as appropriate  - Encourage maximum independence but intervene and supervise when necessary  - Involve family in performance of ADLs  - Assess for home care needs following discharge   - Consider OT consult to assist with ADL evaluation and planning for discharge  - Provide patient education as appropriate  Outcome: Progressing  Goal: Maintains/Returns to pre admission functional level  Description: INTERVENTIONS:  - Perform BMAT or MOVE assessment daily    - Set and communicate daily mobility goal to care team and patient/family/caregiver  - Collaborate with rehabilitation services on mobility goals if consulted  - Perform Range of Motion 4 times a day  - Reposition patient every 2 hours    - Dangle patient 3 times a day  - Stand patient 3 times a day  - Ambulate patient 3 times a day  - Out of bed to chair 3 times a day   - Out of bed for meals 3 times a day  - Out of bed for toileting  - Record patient progress and toleration of activity level   Outcome: Progressing  Goal: Patient will remain free of falls  Description: INTERVENTIONS:  - Educate patient/family on patient safety including physical limitations  - Instruct patient to call for assistance with activity   - Consult OT/PT to assist with strengthening/mobility   - Keep Call bell within reach  - Keep bed low and locked with side rails adjusted as appropriate  - Keep care items and personal belongings within reach  - Initiate and maintain comfort rounds  - Make Fall Risk Sign visible to staff  - Offer Toileting every 2 Hours, in advance of need  - Initiate/Maintain fall alarm  - Obtain necessary fall risk management equipment: alarm, nonskid socks, yellow bracelet  - Apply yellow socks and bracelet for high fall risk patients  - Consider moving patient to room near nurses station  Outcome: Progressing     Problem: DISCHARGE PLANNING  Goal: Discharge to home or other facility with appropriate resources  Description: INTERVENTIONS:  - Identify barriers to discharge w/patient and caregiver  - Arrange for needed discharge resources and transportation as appropriate  - Identify discharge learning needs (meds, wound care, etc )  - Arrange for interpretive services to assist at discharge as needed  - Refer to Case Management Department for coordinating discharge planning if the patient needs post-hospital services based on physician/advanced practitioner order or complex needs related to functional status, cognitive ability, or social support system  Outcome: Progressing     Problem: Knowledge Deficit  Goal: Patient/family/caregiver demonstrates understanding of disease process, treatment plan, medications, and discharge instructions  Description: Complete learning assessment and assess knowledge base    Interventions:  - Provide teaching at level of understanding  - Provide teaching via preferred learning methods  Outcome: Progressing     Problem: RESPIRATORY - ADULT  Goal: Achieves optimal ventilation and oxygenation  Description: INTERVENTIONS:  - Assess for changes in respiratory status  - Assess for changes in mentation and behavior  - Position to facilitate oxygenation and minimize respiratory effort  - Oxygen administered by appropriate delivery if ordered  - Initiate smoking cessation education as indicated  - Encourage broncho-pulmonary hygiene including cough, deep breathe, Incentive Spirometry  - Assess the need for suctioning and aspirate as needed  - Assess and instruct to report SOB or any respiratory difficulty  - Respiratory Therapy support as indicated  Outcome: Progressing     Problem: GENITOURINARY - ADULT  Goal: Maintains or returns to baseline urinary function  Description: INTERVENTIONS:  - Assess urinary function  - Encourage oral fluids to ensure adequate hydration if ordered  - Administer IV fluids as ordered to ensure adequate hydration  - Administer ordered medications as needed  - Offer frequent toileting  - Follow urinary retention protocol if ordered  Outcome: Progressing  Goal: Absence of urinary retention  Description: INTERVENTIONS:  - Assess patients ability to void and empty bladder  - Monitor I/O  - Bladder scan as needed  - Discuss with physician/AP medications to alleviate retention as needed  - Discuss catheterization for long term situations as appropriate  Outcome: Progressing     Problem: METABOLIC, FLUID AND ELECTROLYTES - ADULT  Goal: Electrolytes maintained within normal limits  Description: INTERVENTIONS:  - Monitor labs and assess patient for signs and symptoms of electrolyte imbalances  - Administer electrolyte replacement as ordered  - Monitor response to electrolyte replacements, including repeat lab results as appropriate  - Instruct patient on fluid and nutrition as appropriate  Outcome: Progressing  Goal: Fluid balance maintained  Description: INTERVENTIONS:  - Monitor labs   - Monitor I/O and WT  - Instruct patient on fluid and nutrition as appropriate  - Assess for signs & symptoms of volume excess or deficit  Outcome: Progressing     Problem: MUSCULOSKELETAL - ADULT  Goal: Maintain or return mobility to safest level of function  Description: INTERVENTIONS:  - Assess patient's ability to carry out ADLs; assess patient's baseline for ADL function and identify physical deficits which impact ability to perform ADLs (bathing, care of mouth/teeth, toileting, grooming, dressing, etc )  - Assess/evaluate cause of self-care deficits   - Assess range of motion  - Assess patient's mobility  - Assess patient's need for assistive devices and provide as appropriate  - Encourage maximum independence but intervene and supervise when necessary  - Involve family in performance of ADLs  - Assess for home care needs following discharge   - Consider OT consult to assist with ADL evaluation and planning for discharge  - Provide patient education as appropriate  Outcome: Progressing  Goal: Maintain proper alignment of affected body part  Description: INTERVENTIONS:  - Support, maintain and protect limb and body alignment  - Provide patient/ family with appropriate education  Outcome: Progressing

## 2022-05-26 NOTE — DISCHARGE SUMMARY
[address]  Discharge- Seema Ryan 1965, 55 y o  male MRN: [id number]  Unit/Bed#: 181-15 Encounter: [id number]  Primary Care Provider: LORA Obando   Date and time admitted to hospital: 5/20/2022  7:43 AM    * Hyponatremia  Assessment & Plan  · Symptomatic hyponatremia with lightheadedness, dizziness, a visual disturbance, nausea, and gait instability resulting in multiple falls  · Chronic hyponatremia with a baseline sodium level of 129-130 mmol/L  · Improved after initiation of Cortef with sodium of 128 mmol/L this morning  · Appears multifactorial underlying cortisol deficiency   · Continue Cortef on discharge     Results from last 7 days   Lab Units 05/26/22 0441 05/25/22  0438 05/24/22  0506   SODIUM mmol/L 130* 128* 123*   POTASSIUM mmol/L 2 6* 4 0 3 2*   CHLORIDE mmol/L 92* 92* 87*   CO2 mmol/L 30 28 30   BUN mg/dL 15 15 11   CREATININE mg/dL 0 71 0 75 0 82   CALCIUM mg/dL 7 4* 7 8* 6 8*      Latest Reference Range & Units 05/21/22 06:22   TSH 3RD GENERATON 0 450 - 4 500 uIU/mL 1 445 [1]   [1] Adult TSH (3rd generation) reference range follows the recommended guidelines of the American [employer] Association, January, 2020      Hypomagnesemia  Assessment & Plan  · Resolved with aggressive replacement     Results from last 7 days   Lab Units 05/26/22 0441 05/25/22  0438 05/24/22  0506   MAGNESIUM mg/dL 1 8 1 5* 1 2*         Chronic anemia  Assessment & Plan  No evidence of bleeding with hemoglobin stable around 7  Monitor CBC transfuse for hemoglobin less than 7  Continue vitamin I51 and folic acid supplementation    Cortisol deficiency (HCC)  Assessment & Plan  Felt to be etiology of hyponatremia  Initiated on Cortef therapy, continue on discharge  Referral to endocrinology placed    Clavicular fracture  Assessment & Plan  Non operative per Orthopedic surgery, continue supportive management, apply sling    COVID-19 virus infection  Assessment & Plan  · Initially requiered 3 lpm of continuous supplemental oxygen to maintain oxygen saturations of 90% and above  · Overnight from 05/21/2022-05/22/2022 developed worsening hypoxia and required up to 5 lpm O2  · Changed to the "Moderate" COVID-19 treatment algorithm on 05/22/2022  · Patient was noted for intermittent refusal of supplemental oxygen, he mostly maintains adequate oxygen saturations on room air but continues to require oxygen overnight  · Completed a 5 day course of Remdesivir, continue baricitinb   · Antibiotics are not indicated with procalcitonin remained in normal x2  · Steroid therapy now changed to Cortef due to cortisol deficiency - continue upon discharge  · D-dimer was elevated, however no evidence of DVT or PE on imaging      Traore's esophagus  Assessment & Plan  · Continue PPI therapy  · Outpatient surveillance EGD's with Gastroenterology    Hypoxia  Assessment & Plan  · Due the COVID-19 virus infection and rib fractures, with nocturnal hypoxia concerning for sleep apnea  · Continues to require up to 4 L overnight, no oxygen requirements during the day, also has been refusing supplemental oxygen intermittently  · Qualifies for oxygen overnight based on pulse oximetry  · Will need an outpatient sleep study, referral to Sleep Medicine placed    Seizure disorder [facility])  Assessment & Plan  · Had 5-6 episodes at home over the last few weeks, which he feels may represent seizure activity  · Continue PO keppra  · Monitor for any seizure activity    Rib fractures  Assessment & Plan  · Right-sided rib fractures of the 9th rib and 10th rib  · Rib fracture protocol  · Pain control - discharged on oxycodone for 3 additional days, 1 regarding risk of overdose if combined with other substances including alcohol    CT scan of the chest/abdomen/pelvis (05/20/2022): Several healed right-sided rib fractures  Recent appearing fractures of the posterior right 9th and 10th ribs at their costovertebral junctions  Thoracic compression fracture [facility])  Assessment & Plan  · Multiple compression fractures  · Needs an outpatient DEXA scan with his PCP  · PT/OT  · Outpatient Orthopedic Spine Surgery evaluation  · Will discharge on short course oxycodone, warned against any other substance use with significantly increased risk of overdose    CT scan of the chest/abdomen/pelvis (05/20/2022): IMPRESSION:     1  Chronic compression fractures of T2, T3, T6 and T7      2   Mild compression fractures of T8 4 and T8, age indeterminant, although developing since 10/30/2021  Alcohol use  Assessment & Plan  · No evidence of withdrawal, and discontinue CIWA protocol  · Continue folic acid and thiamine supplementation  · Daily PO multivitamin  · Alcohol cessation counseling    Pancreatic pseudocyst  Assessment & Plan  · Outpatient surveillance imaging with Gastroenterology      CT scan of the chest/abdomen/pelvis (05/20/2022):  PANCREAS:  Limited evaluation without IV contrast   Calcifications in the pancreatic head and proximal body, indicative of chronic pancreatitis  3 0 x 3 2 cm cyst in the pancreatic head, measuring 3 4 x 3 9 cm on the CT from 10/30/2021  Thrombocytopenia ([address] )  Assessment & Plan  · Likely due to liver disease  · Platelet count normalized   · Follow CBC outpatient       Results from last 7 days   Lab Units 05/26/22  0441 05/24/22  0506 05/23/22  0507   WBC Thousand/uL 6 46 8 90 8 46   HEMOGLOBIN g/dL 7 1* 7 2* 7 2*   HEMATOCRIT % 23 3* 22 7* 23 1*   PLATELETS Thousands/uL 165 139* 119*         Hypokalemia  Assessment & Plan  · Aggressive replacement prior to discharge  · Continue potassium supplementation and lisinopril  · Repeat BMP in 5-7 days    Vitamin D deficiency  Assessment & Plan  · Continue cholecalciferol 2000 I  U  PO Qdaily     Latest Reference Range & Units 05/21/22 06:22   Vit D, 25-Hydroxy 30 0 - 100 0 ng/mL 23 9 (L)   (L): Data is abnormally low    Continue replacement at discharge    Hepatic steatosis  Assessment & Plan  · Due to alcohol abuse  · Avoid all hepatotoxic agents  · Outpatient surveillance imaging with Gastroenterology  · Alcohol cessation strongly recommended    Tobacco abuse  Assessment & Plan  · The patient declined a nicotine patch  · Smoking cessation counseling        Medical Problems             Resolved Problems  Date Reviewed: 5/26/2022   None               Discharging Physician / Practitioner: Tree Crowell MD  PCP: Aliya Cody, [address] [place] [address]  Admission Date:   Admission Orders (From admission, onward)     Ordered        05/20/22 0958  Inpatient Admission  Once                      Discharge Date: 05/26/22    Consultations During Hospital Stay:  · Nephrology, pulmonology    Procedures Performed:   CTA chest pe study   Final Result by Feliz Smith MD (05/23 6797)      No pulmonary embolus  Acute mildly comminuted fracture of the medial right clavicle  Mild diffuse bilateral peripheral groundglass opacity due to Covid-19  New trace pleural effusions  This study was marked for significant notification and follow-up  Workstation performed: [id number]         NM lung perfusion imaging   Final Result by Chris Patel MD (05/23 5929)      Finding is most consistent with intermediate probability for acute pulmonary artery embolus with moderate to large perfusion defects involving the right lung as detailed above  Workstation performed: [id number]         XR chest pa & lateral   Final Result by Francisco Godinez MD (05/24 1953)      Developing groundglass opacities in the upper lobes  The study was marked in [facility] for immediate notification  Workstation performed: [id number]         VAS lower limb venous duplex study, complete bilateral   Final Result by Mckenzie Marks MD (05/23 2362)      TRAUMA - CT head wo contrast   Final Result by Rehana Pak MD (05/20 9834)      No acute intracranial abnormality  Microangiopathic changes  Moderate diffuse parenchymal volume loss  Pansinus mucosal thickening  The study was marked in [facility] for immediate notification  Workstation performed: [id number]         TRAUMA - CT spine cervical wo contrast   Final Result by Tom Edward MD (05/20 4632)      No cervical spine fracture or traumatic malalignment  Unchanged fracture of the right-sided screw at T1  Hardware is unchanged in appearance  Multilevel degenerative change of the spine  Workstation performed: [id number]         CT chest abdomen pelvis wo contrast   Final Result by Za Mcacnn MD (71/13 5711)      1  Limited examination due to absence of IV contrast    2      3   Mild ectasia of the ascending aorta, measuring up to 4 0 cm       4   Lingular subsegmental atelectasis  5   Hepatic steatosis  6   Persistent pseudocyst in the pancreatic head, decreased in size since a CT from 10/30/2021       7   Nonspecific mild bladder wall thickening  8   No evidence of acute injury in the chest, abdomen or pelvis  9   Chronic compression fractures of T6 and T7  Healed fracture of the left inferior pubic ramus and several healed right-sided rib fractures  10   Mild compression fracture of T8, involving the inferior endplate, age indeterminate although developing since 10/30/2021  11   Recent appearing fractures of the right 9th and 10th ribs at their costovertebral junctions  The study was marked in [facility] for immediate notification  Workstation performed: [id number]         CT recon only thoracolumbar (no charge)   Final Result by Za Mccann MD (05/20 2824)      1  Chronic compression fractures of T2, T3, T6 and T7       2   Mild compression fractures of T8 4 and T8, age indeterminant, although developing since 10/30/2021       3   Chronic, healed fractures of the posterior portions of several ribs, bilaterally  4   Recent appearing fracture of the left sacral ala  5   Degenerative changes at several thoracic and lumbar levels  The study was marked in [facility] for immediate notification  Workstation performed: [id number]         XR Trauma chest portable   ED Interpretation by Quintin Cervantes DO (05/20 7221)   Airway is midline  Lungs are clear bilaterally with no evidence of pulmonary vascular congestion/focal infiltrate/pleural effusion//pneumothorax  Cardiac and mediastinal silhouettes are within normal limits  Osseous structures appear normal         Final Result by Suleman Boston MD (05/20 5494)      No focal consolidation, pleural effusion, or pneumothorax  Known rib fractures are better seen on the concurrent chest CT  Workstation performed: [id number]               Significant Findings / Test Results:   Results from last 7 days   Lab Units 05/26/22  0441   WBC Thousand/uL 6 46   HEMOGLOBIN g/dL 7 1*   HEMATOCRIT % 23 3*   PLATELETS Thousands/uL 165     Results from last 7 days   Lab Units 05/26/22  0441   SODIUM mmol/L 130*   POTASSIUM mmol/L 2 6*   CHLORIDE mmol/L 92*   CO2 mmol/L 30   BUN mg/dL 15   CREATININE mg/dL 0 71   CALCIUM mg/dL 7 4*         Incidental Findings:   · None     Test Results Pending at Discharge (will require follow up): · None     Outpatient Tests Requested:  · BMP     Complications:  None    Reason for Admission:  Multiple falls    Hospital Course:   Kandice Lu is a 55 y o  male patient with history of alcohol use, electrolyte deficiencies, chronic hypernatremia and ambulatory dysfunction who originally presented to the hospital on 5/20/2022 due to multiple falls  Found to have multiple fractures as above, nonoperable per orthopedic surgery  The patient was treated supportively for his fractures and was also provided a sling for his right arm    He was discharged to continue oxycodone as needed for severe pain with caution and explained that he cannot come by and this medication with alcohol or illicit or non-prescribed drugs  The patient was also noted for acute on chronic hyponatremia  Further workup he was found to have cortisol deficiency and initiated on Cortef  Patient's sodium was at his baseline of 130 at the time of the discharge  He is referred to endocrinology for further workup  The patient on admission was also diagnosed with COVID infection  He underwent treatment as above  Of note, the patient was identified for nocturnal hypoxia  With this overnight oximetry study he did qualify for supplemental oxygen which was arranged by case management to be delivered to his home  The patient was discharged in improved and stable condition  He is to follow up with PCP, Nephrology and Endocrinology  Please see above list of diagnoses and related plan for additional information  Condition at Discharge: stable    Discharge Day Visit / Exam:   Subjective: The patient has been examined  He states that he feels well and wants to go home today  He voices no complaints  He states that his pain is fairly controlled  Vitals: Blood Pressure: 133/75 (05/26/22 0759)  Pulse: 80 (05/26/22 0759)  Temperature: 98 °F (36 7 °C) (05/26/22 0431)  Temp Source: Oral (05/26/22 0431)  Respirations: 19 (05/26/22 0759)  Height: 5' 6" (167 6 cm) (05/23/22 2456)  Weight - Scale: 55 3 kg (122 lb) (05/23/22 0632)  SpO2: 96 % (05/26/22 0759)  Exam:   Physical Exam  Constitutional:       General: He is not in acute distress  Appearance: He is not ill-appearing  HENT:      Head: Normocephalic and atraumatic  Nose: No congestion  Mouth/Throat:      Pharynx: Oropharynx is clear  Eyes:      Conjunctiva/sclera: Conjunctivae normal    Cardiovascular:      Rate and Rhythm: Normal rate and regular rhythm  Heart sounds: No murmur heard  Pulmonary:      Effort: No respiratory distress  Breath sounds: No wheezing or rales     Abdominal:      General: There is no distension  Tenderness: There is no abdominal tenderness  There is no guarding  Musculoskeletal:      Right lower leg: No edema  Left lower leg: No edema  Skin:     General: Skin is warm and dry  Neurological:      Mental Status: He is oriented to person, place, and time  Psychiatric:         Mood and Affect: Mood normal           Discharge instructions/Information to patient and family:   See after visit summary for information provided to patient and family  Provisions for Follow-Up Care:  See after visit summary for information related to follow-up care and any pertinent home health orders  Disposition:   Home with VNA Services (Reminder: Complete face to face encounter)    Planned Readmission: No     Discharge Statement:  I spent 35 minutes discharging the patient  This time was spent on the day of discharge  I had direct contact with the patient on the day of discharge  Greater than 50% of the total time was spent examining patient, answering all patient questions, arranging and discussing plan of care with patient as well as directly providing post-discharge instructions  Additional time then spent on discharge activities  Discharge Medications:  See after visit summary for reconciled discharge medications provided to patient and/or family        **Please Note: This note may have been constructed using a voice recognition system**

## 2022-05-26 NOTE — ASSESSMENT & PLAN NOTE
· Continue cholecalciferol 2000 I  U  PO Qdaily     Latest Reference Range & Units 05/21/22 06:22   Vit D, 25-Hydroxy 30 0 - 100 0 ng/mL 23 9 (L)   (L): Data is abnormally low    Continue replacement at discharge

## 2022-05-26 NOTE — CASE MANAGEMENT
Case Management Discharge Planning Note    Patient name Iggy Harrison  Location [address]102[address]53 MRN [id number]  : 1965 Date 2022       Current Admission Date: 2022  Current Admission Diagnosis:Hyponatremia   Patient Active Problem List    Diagnosis Date Noted    Chronic anemia 2022    Clavicular fracture 2022    Cortisol deficiency ([address] ) 2022    Hypocalcemia 2022    Elevated d-dimer 2022    COVID-19 virus infection 2022    Alcohol use 2022    Thoracic compression fracture ([address] ) 2022    Aortic ectasia ([address] ) 2022    Rib fractures 2022    Seizure disorder [facility] ) 2022    Hypoxia 2022    Traore's esophagus 2022    Ambulatory dysfunction 2021    Current every day smoker 2021    Fall 2021    Hx of duodenal ulcer 2021    Neck pain 2021    Acute on chronic pancreatitis ([address] ) 2020    Pancreatic pseudocyst 2020    COPD (chronic obstructive pulmonary disease) ([facility] ) 2020    Ileus ([address] ) 2020    Abnormal EKG 2020    Lesion of spleen 2020    Left anterior fascicular block 2020    Constipation 2020    Transaminitis 2020    Celiac artery stenosis ([address] ) 2020    Pancreatic mass 2020    Pancytopenia ([address] ) 2020    Traore esophagus     Duodenal ulcer 2019    Tubular adenoma 2019    Iron deficiency anemia due to chronic blood loss 10/22/2019    Closed fracture of left olecranon process, initial encounter 2019    Thrombocytopenia ([address] )     Alcoholic cirrhosis of liver without ascites ([facility] ) 2018    Seizure-like activity ([address] ) 2018    Diarrhea 2018    Abnormality of pancreatic duct 2017    Hx of seizure disorder 10/12/2017    Incisional hernia 10/08/2017    Continuous chronic alcoholism ([facility] ) 10/05/2017    T6 vertebral fracture ([facility] ) 2017    Neuropathy involving both lower extremities 08/15/2017    Hypokalemia 07/29/2017    Malnutrition of moderate degree (HCC) 05/18/2017    Generalized weakness 05/17/2017    Bladder wall thickening 03/09/2017    Hypophosphatemia 03/09/2017    Urinary retention 61/10/1788    Alcoholic hepatitis without ascites 03/07/2017    Vitamin D deficiency 03/07/2017    Iron deficiency 03/07/2017    Depression 02/08/2017    Elevated alkaline phosphatase level 01/14/2017    Hepatomegaly 01/12/2017    Hepatic steatosis 01/12/2017    Hypomagnesemia 01/12/2017    Hyponatremia 01/11/2017    Dietary folate deficiency anemia 01/11/2017    Ventral hernia without obstruction or gangrene 01/11/2017    Abdominal wound dehiscence 12/15/2016    Microcytic anemia 06/20/2016    Allergic rhinitis 06/07/2016    Tobacco abuse 05/22/2016    Essential hypertension 05/22/2016    H/O suicide attempt 05/22/2016    H/O cervical spine surgery 05/22/2016    Left foot drop 10/31/2014    Peripheral neuropathy 10/31/2014    Cervical disc disorder with myelopathy 09/25/2014    Lumbar radiculopathy 09/25/2014    Cervical spinal stenosis 07/30/2014      LOS (days): 6  Geometric Mean LOS (GMLOS) (days): 5 40  Days to GMLOS:-0 6     OBJECTIVE:  Risk of Unplanned Readmission Score: 20 77         Current admission status: Inpatient   Preferred Pharmacy:   [facility] [place] - [address] [facility]  Phone: [phone] Fax: [phone]    Primary Care Provider: LORA Lockwood    Primary Insurance: [facility]  Secondary Insurance:     DISCHARGE DETAILS:    Discharge planning discussed with[de-identified] patient  Freedom of Choice: Yes  Comments - Freedom of Choice: referral to [address] for nocturnal oxygen       Requested [address]         Is the patient interested in [address] at discharge?: No    DME Referral Provided  Referral made for DME?: Yes  DME referral completed for the following items[de-identified] Home Oxygen concentrator  DME Supplier Name[de-identified] [employer]

## 2022-05-27 ENCOUNTER — TELEPHONE (OUTPATIENT)
Dept: OTHER | Facility: OTHER | Age: 57
End: 2022-05-27

## 2022-05-27 ENCOUNTER — APPOINTMENT (OUTPATIENT)
Dept: PHYSICAL THERAPY | Facility: CLINIC | Age: 57
End: 2022-05-27
Payer: COMMERCIAL

## 2022-05-27 ENCOUNTER — TRANSITIONAL CARE MANAGEMENT (OUTPATIENT)
Dept: FAMILY MEDICINE CLINIC | Facility: CLINIC | Age: 57
End: 2022-05-27

## 2022-05-27 NOTE — TELEPHONE ENCOUNTER
Patient calling stating he missed a call from the office, he states he is in [facility] and does not think he will be going home after his is discharged, he states he would like [place] so he can complete therapy, get medical care all in one spot

## 2022-05-31 ENCOUNTER — APPOINTMENT (OUTPATIENT)
Dept: PHYSICAL THERAPY | Facility: CLINIC | Age: 57
End: 2022-05-31
Payer: COMMERCIAL

## 2022-06-02 ENCOUNTER — TELEPHONE (OUTPATIENT)
Dept: GASTROENTEROLOGY | Facility: CLINIC | Age: 57
End: 2022-06-02

## 2022-06-07 ENCOUNTER — TELEPHONE (OUTPATIENT)
Dept: NEUROLOGY | Facility: CLINIC | Age: 57
End: 2022-06-07

## 2022-06-07 DIAGNOSIS — D50.0 IRON DEFICIENCY ANEMIA DUE TO CHRONIC BLOOD LOSS: Primary | ICD-10-CM

## 2022-06-07 RX ORDER — SODIUM CHLORIDE 9 MG/ML
20 INJECTION, SOLUTION INTRAVENOUS ONCE
Status: CANCELLED | OUTPATIENT
Start: 2022-06-09

## 2022-06-07 NOTE — TELEPHONE ENCOUNTER
1st attempt to reach patient for referral   No answer   LMOM  Patient last seen by [facility] [place] on 6/19/2019

## 2022-06-10 ENCOUNTER — TELEPHONE (OUTPATIENT)
Dept: GASTROENTEROLOGY | Facility: CLINIC | Age: 57
End: 2022-06-10

## 2022-06-14 NOTE — TELEPHONE ENCOUNTER
2nd attempt to reach patient for referral   No answer   LMOM  Patient last seen by [facility] [place] on 6/19/2019  Letter sent

## 2022-07-15 ENCOUNTER — TELEPHONE (OUTPATIENT)
Dept: FAMILY MEDICINE CLINIC | Facility: CLINIC | Age: 57
End: 2022-07-15

## 2022-07-15 NOTE — TELEPHONE ENCOUNTER
----- Message from [address] sent at 7/15/2022 12:59 PM EDT -----  Please contact pt for follow up appt    Thanks,  Mauro Claros

## 2022-08-22 ENCOUNTER — OFFICE VISIT (OUTPATIENT)
Dept: FAMILY MEDICINE CLINIC | Facility: CLINIC | Age: 57
End: 2022-08-22
Payer: COMMERCIAL

## 2022-08-22 VITALS
BODY MASS INDEX: 15.62 KG/M2 | HEIGHT: 66 IN | WEIGHT: 97.2 LBS | TEMPERATURE: 96.4 F | OXYGEN SATURATION: 97 % | SYSTOLIC BLOOD PRESSURE: 104 MMHG | DIASTOLIC BLOOD PRESSURE: 52 MMHG | RESPIRATION RATE: 16 BRPM | HEART RATE: 104 BPM

## 2022-08-22 DIAGNOSIS — M50.10 CERVICAL DISC SYNDROME: ICD-10-CM

## 2022-08-22 DIAGNOSIS — Z78.9 ALCOHOL USE: ICD-10-CM

## 2022-08-22 DIAGNOSIS — F17.200 CURRENT EVERY DAY SMOKER: ICD-10-CM

## 2022-08-22 DIAGNOSIS — G40.909 SEIZURE DISORDER (HCC): ICD-10-CM

## 2022-08-22 DIAGNOSIS — K85.90 ACUTE ON CHRONIC PANCREATITIS (HCC): ICD-10-CM

## 2022-08-22 DIAGNOSIS — I95.9 HYPOTENSION, UNSPECIFIED HYPOTENSION TYPE: Primary | ICD-10-CM

## 2022-08-22 DIAGNOSIS — R26.2 AMBULATORY DYSFUNCTION: ICD-10-CM

## 2022-08-22 DIAGNOSIS — E87.1 HYPONATREMIA: ICD-10-CM

## 2022-08-22 DIAGNOSIS — K70.30 ALCOHOLIC CIRRHOSIS OF LIVER WITHOUT ASCITES (HCC): ICD-10-CM

## 2022-08-22 DIAGNOSIS — K86.1 ACUTE ON CHRONIC PANCREATITIS (HCC): ICD-10-CM

## 2022-08-22 DIAGNOSIS — D50.0 IRON DEFICIENCY ANEMIA DUE TO CHRONIC BLOOD LOSS: ICD-10-CM

## 2022-08-22 DIAGNOSIS — J44.1 CHRONIC OBSTRUCTIVE PULMONARY DISEASE WITH ACUTE EXACERBATION (HCC): ICD-10-CM

## 2022-08-22 DIAGNOSIS — E83.42 HYPOMAGNESEMIA: ICD-10-CM

## 2022-08-22 DIAGNOSIS — R60.9 FLUID RETENTION: ICD-10-CM

## 2022-08-22 DIAGNOSIS — E44.0 MALNUTRITION OF MODERATE DEGREE (HCC): ICD-10-CM

## 2022-08-22 DIAGNOSIS — E27.49 CORTISOL DEFICIENCY (HCC): ICD-10-CM

## 2022-08-22 DIAGNOSIS — K86.3 PANCREATIC PSEUDOCYST: ICD-10-CM

## 2022-08-22 PROCEDURE — T1015 CLINIC SERVICE: HCPCS | Performed by: FAMILY MEDICINE

## 2022-08-22 RX ORDER — ALBUTEROL SULFATE 0.63 MG/3ML
SOLUTION RESPIRATORY (INHALATION)
COMMUNITY
Start: 2022-07-13

## 2022-08-22 RX ORDER — LEVETIRACETAM 1000 MG/1
1000 TABLET ORAL EVERY 12 HOURS
Qty: 60 TABLET | Refills: 2 | Status: SHIPPED | OUTPATIENT
Start: 2022-08-22

## 2022-08-22 RX ORDER — LORATADINE 10 MG/1
10 TABLET, ORALLY DISINTEGRATING ORAL DAILY
COMMUNITY

## 2022-08-22 RX ORDER — LORAZEPAM 0.5 MG/1
0.5 TABLET ORAL EVERY 8 HOURS PRN
COMMUNITY
Start: 2022-08-11 | End: 2022-09-13 | Stop reason: SDUPTHER

## 2022-08-22 RX ORDER — SPIRONOLACTONE 100 MG/1
100 TABLET, FILM COATED ORAL DAILY
Qty: 90 TABLET | Refills: 3 | Status: SHIPPED | OUTPATIENT
Start: 2022-08-22

## 2022-08-22 RX ORDER — DIPHENOXYLATE HYDROCHLORIDE AND ATROPINE SULFATE 2.5; .025 MG/1; MG/1
1 TABLET ORAL DAILY
COMMUNITY
Start: 2022-07-13 | End: 2023-07-13

## 2022-08-22 RX ORDER — LEVETIRACETAM 250 MG/1
TABLET ORAL
COMMUNITY
Start: 2022-07-25 | End: 2022-08-22 | Stop reason: DRUGHIGH

## 2022-08-22 RX ORDER — LACTULOSE 10 G/15ML
SOLUTION ORAL
COMMUNITY
Start: 2022-07-13

## 2022-08-22 RX ORDER — BUMETANIDE 1 MG/1
1 TABLET ORAL 2 TIMES DAILY
COMMUNITY
Start: 2022-07-12 | End: 2022-08-22 | Stop reason: SDUPTHER

## 2022-08-22 RX ORDER — SPIRONOLACTONE 100 MG/1
100 TABLET, FILM COATED ORAL DAILY
COMMUNITY
Start: 2022-07-13 | End: 2022-08-22 | Stop reason: SDUPTHER

## 2022-08-22 RX ORDER — BUMETANIDE 1 MG/1
1 TABLET ORAL 2 TIMES DAILY
Qty: 60 TABLET | Refills: 2 | Status: SHIPPED | OUTPATIENT
Start: 2022-08-22 | End: 2022-10-24

## 2022-08-22 NOTE — PROGRESS NOTES
Assessment/Plan     Diagnoses and all orders for this visit:    Hypotension, unspecified hypotension type    Cervical disc syndrome  -     levETIRAcetam (KEPPRA) 1000 MG tablet; Take 1 tablet (1,000 mg total) by mouth every 12 (twelve) hours    Seizures (HCC)  -     levETIRAcetam (KEPPRA) 1000 MG tablet; Take 1 tablet (1,000 mg total) by mouth every 12 (twelve) hours    Ambulatory dysfunction  -     Ambulatory Referral to Physical Therapy; Future    Fluid retention  -     spironolactone (ALDACTONE) 100 mg tablet; Take 1 tablet (100 mg total) by mouth daily  -     bumetanide (BUMEX) 1 mg tablet; Take 1 tablet (1 mg total) by mouth 2 (two) times a day    Alcohol use    Iron deficiency anemia due to chronic blood loss  -     CBC and Platelet; Future    Hyponatremia  -     Comprehensive metabolic panel; Future    Hypomagnesemia  -     Magnesium; Future    Chronic obstructive pulmonary disease with acute exacerbation (HCC)    Alcoholic cirrhosis of liver without ascites ([facility] )  -     Ambulatory Referral to Gastroenterology; Future    Pancreatic pseudocyst  -     Ambulatory Referral to Gastroenterology; Future    Acute on chronic pancreatitis [facility])  -     Ambulatory Referral to Gastroenterology; Future    Cortisol deficiency ([facility] )  -     Ambulatory Referral to Endocrinology; Future    Current every day smoker    Malnutrition of moderate degree (HCC)    Other orders  -     Cancel: Hepatitis A hepatitis B combined vaccine IM  -     multivitamin (THERAGRAN) TABS; Take 1 tablet by mouth daily  -     Discontinue: bumetanide (BUMEX) 1 mg tablet; Take 1 mg by mouth 2 (two) times a day  -     diphenhydrAMINE (SOMINEX) 25 MG tablet;  Take 25 mg by mouth (Patient not taking: Reported on 8/22/2022)  -     lactulose (CHRONULAC) 10 g/15 mL solution;  (Patient not taking: Reported on 8/22/2022)  -     Discontinue: levETIRAcetam (KEPPRA) 250 mg tablet;  (Patient not taking: Reported on 8/22/2022)  -     loratadine (Claritin Reditabs) 10 MG dissolvable tablet; Take 10 mg by mouth daily (Patient not taking: Reported on 8/22/2022)  -     LORazepam (ATIVAN) 0 5 mg tablet; 0 5 mg every 8 (eight) hours as needed for anxiety  -     Discontinue: spironolactone (ALDACTONE) 100 mg tablet; Take 100 mg by mouth daily  -     albuterol (ACCUNEB) 0 63 MG/3ML nebulizer solution;  (Patient not taking: Reported on 8/22/2022)      Patient overall doing well today despite recent complex admission  I spent approximately 30 minutes today reviewing previous records within his chart  He states he feels well and has no complaints  At this time, we will check labs as above to monitor electrolytes and his anemia  Referrals placed above for Endocrinology and Gastroenterology  I advised patient to make sure that he also follows up with his neurologist and cardiologist   He should continue his current medications  I did provide him with some refills today  Due to ongoing ambulatory dysfunction and overall generalized debility, will see if home health physical therapy may evaluate and treat the patient to help with strengthening  I did briefly talk to patient about smoking cessation which he has significantly cut back on  Otherwise no further changes at this time  Patient in agreement with above plan  He should follow-up with his PCP in 1 month  Subjective     Patient Id: Yeimi Romero is a 65 y o  male    HPI    Patient here today for TCM visit following a prolonged complex hospital admission at the [facility]  Patient was initially admitted on 05/27/2022 and discharged on 07/12/202  He then went on to a skilled nursing facility for a few weeks  Please see the detailed documentation from [facility] for further details regarding hospital admission  Today, he reports he is feeling well  He is now at home and staying with his brother  He notes he is eating and drinking well  He reports that he has appointments scheduled with his specialists   Today his only concern is ongoing back pain  He denies any falls or seizures since his discharge  He is ambulating with a walker  He reports today that he has not used any alcohol since his discharge  He also reports he has significantly reduced his smoking down to less than 1 pack per day  Otherwise has no concerns or complaints today  Review of Systems   Constitutional: Positive for fatigue  Negative for appetite change, chills and fever  HENT: Negative for congestion  Respiratory: Negative for cough, chest tightness, shortness of breath and wheezing  Cardiovascular: Negative for chest pain, palpitations and leg swelling  Gastrointestinal: Negative for abdominal pain, constipation, diarrhea, nausea and vomiting  Genitourinary: Negative for dysuria  Musculoskeletal: Positive for back pain  Negative for arthralgias and myalgias  Skin: Negative for rash  Neurological: Positive for weakness  Negative for seizures and headaches  Psychiatric/Behavioral: Negative for dysphoric mood and sleep disturbance  All other systems reviewed and are negative  Past Medical History:   Diagnosis Date    Alcohol abuse     Traore esophagus     Bowel obstruction (HCC)     Bowel perforation (HCC)     Cardiac disease     Continuous chronic [facility]ism ([facility] ) 10/5/2017    COPD (chronic obstructive pulmonary disease) (HCC)     History of shoulder surgery     Right shoulder    History of transfusion     Hx of cervical spine surgery     Hypertension     Incisional hernia 10/8/2017    MI, old     Mitral regurgitation     Psychiatric disorder     Seizures ([facility] )        Past Surgical History:   Procedure Laterality Date    APPENDECTOMY      BACK SURGERY      CHOLECYSTECTOMY      ESOPHAGOGASTRODUODENOSCOPY N/A 11/28/2016    Procedure: ESOPHAGOGASTRODUODENOSCOPY (EGD); Surgeon: Marguerite Kelley MD;  [facility]: [facility];   Service:     GALLBLADDER SURGERY      LAPAROTOMY N/A 10/25/2016    Procedure: LAPAROTOMY EXPLORATORY;  Surgeon: Nasima Parker MD;  Location: [facility];  Service:     MOUTH SURGERY      PERCUTANEOUS PINNING FEMORAL NECK FRACTURE      SHOULDER SURGERY Right     SHOULDER SURGERY      SMALL INTESTINE SURGERY      STOMACH SURGERY      bal surgery       Family History   Problem Relation Age of Onset    Breast cancer Mother     Prostate cancer Father     Skin cancer Brother        No Known Allergies      Current Outpatient Medications:     albuterol (PROVENTIL HFA,VENTOLIN HFA) 90 mcg/act inhaler, Inhale 2 puffs every 4 (four) hours as needed for wheezing or shortness of breath, Disp: 18 g, Rfl: 2    bumetanide (BUMEX) 1 mg tablet, Take 1 tablet (1 mg total) by mouth 2 (two) times a day, Disp: 60 tablet, Rfl: 2    Cholecalciferol 25 MCG (1000 UT) tablet, Take 1 tablet (1,000 Units total) by mouth daily, Disp: 30 tablet, Rfl: 0    cyanocobalamin (VITAMIN B-12) 100 mcg tablet, Take 1 tablet (100 mcg total) by mouth daily, Disp: 30 tablet, Rfl: 5    ergocalciferol (VITAMIN D2) 50,000 units, Take 1 capsule (50,000 Units total) by mouth once a week, Disp: 13 capsule, Rfl: 1    folic acid (FOLVITE) 1 mg tablet, Take 1 tablet (1 mg total) by mouth daily, Disp: 30 tablet, Rfl: 0    levETIRAcetam (KEPPRA) 1000 MG tablet, Take 1 tablet (1,000 mg total) by mouth every 12 (twelve) hours, Disp: 60 tablet, Rfl: 2    lisinopril (ZESTRIL) 10 mg tablet, Take 1 tablet (10 mg total) by mouth daily, Disp: 30 tablet, Rfl: 5    LORazepam (ATIVAN) 0 5 mg tablet, 0 5 mg every 8 (eight) hours as needed for anxiety, Disp: , Rfl:     multivitamin (THERAGRAN) TABS, Take 1 tablet by mouth daily, Disp: , Rfl:     spironolactone (ALDACTONE) 100 mg tablet, Take 1 tablet (100 mg total) by mouth daily, Disp: 90 tablet, Rfl: 3    albuterol (2 5 mg/3 mL) 0 083 % nebulizer solution, Take 2 5 mg by nebulization every 6 (six) hours as needed for wheezing or shortness of breath (Patient not taking: Reported on 8/22/2022), Disp: , Rfl:     albuterol (ACCUNEB) 0 63 MG/3ML nebulizer solution, , Disp: , Rfl:     dextromethorphan-guaifenesin (MUCINEX DM)  MG per 12 hr tablet, Take 1 tablet by mouth every 12 (twelve) hours (Patient not taking: Reported on 8/22/2022), Disp: 14 tablet, Rfl: 0    diphenhydrAMINE (SOMINEX) 25 MG tablet, Take 25 mg by mouth (Patient not taking: Reported on 8/22/2022), Disp: , Rfl:     docusate sodium (COLACE) 100 mg capsule, Take 1 capsule (100 mg total) by mouth 2 (two) times a day as needed for constipation (Patient not taking: Reported on 5/20/2022), Disp: 30 capsule, Rfl: 0    esomeprazole (NexIUM) 40 MG capsule, Take 1 capsule (40 mg total) by mouth 2 (two) times a day before meals (Patient not taking: Reported on 8/22/2022), Disp: 60 capsule, Rfl: 3    hydrocortisone (CORTEF) 10 mg tablet, Take 5 tablets (50 mg total) by mouth 2 (two) times a day (Patient not taking: Reported on 8/22/2022), Disp: 60 tablet, Rfl: 0    hydrOXYzine HCL (ATARAX) 25 mg tablet, Take 1 tablet (25 mg total) by mouth every 6 (six) hours as needed for itching, allergies or anxiety (Patient not taking: Reported on 8/22/2022), Disp: 90 tablet, Rfl: 0    lactulose (CHRONULAC) 10 g/15 mL solution, , Disp: , Rfl:     loratadine (Claritin Reditabs) 10 MG dissolvable tablet, Take 10 mg by mouth daily (Patient not taking: Reported on 8/22/2022), Disp: , Rfl:     Multiple Vitamin (TAB-A-BONI PO), Take 1 tablet by mouth daily (Patient not taking: Reported on 8/22/2022), Disp: , Rfl:     pancrelipase, Lip-Prot-Amyl, (CREON) 6,000 units delayed release capsule, Take 6,000 units of lipase by mouth 3 (three) times a day with meals (Patient not taking: Reported on 8/22/2022), Disp: 90 capsule, Rfl: 2    polyethylene glycol (GOLYTELY) 4000 mL solution, Take 4,000 mL by mouth once for 1 dose (Patient not taking: Reported on 8/22/2022), Disp: 4000 mL, Rfl: 0    potassium chloride (K-DUR,KLOR-CON) 20 mEq tablet, Take 1 tablet (20 mEq total) by mouth daily for 1 dose (Patient not taking: Reported on 8/22/2022), Disp: 2 tablet, Rfl: 0    thiamine 100 MG tablet, Take 1 tablet (100 mg total) by mouth daily (Patient not taking: Reported on 8/22/2022), Disp: 30 tablet, Rfl: 0    Objective     Vitals:    08/22/22 1104   BP: 104/52   Pulse: 104   Resp: 16   Temp: (!) 96 4 °F (35 8 °C)   SpO2: 97%   Weight: 44 1 kg (97 lb 3 2 oz)   Height: 5' 6" (1 676 m)       Physical Exam  Vitals and nursing note reviewed  Constitutional:       General: He is not in acute distress  Appearance: He is well-developed  He is not toxic-appearing  Comments: Cachectic appearing   HENT:      Head: Normocephalic and atraumatic  Mouth/Throat:      Mouth: Mucous membranes are moist    Eyes:      Extraocular Movements: Extraocular movements intact  Conjunctiva/sclera: Conjunctivae normal       Pupils: Pupils are equal, round, and reactive to light  Neck:      Thyroid: No thyromegaly  Cardiovascular:      Rate and Rhythm: Normal rate and regular rhythm  Pulses: Normal pulses  Heart sounds: Normal heart sounds  No murmur heard  Pulmonary:      Effort: Pulmonary effort is normal  No respiratory distress  Breath sounds: Wheezing (Occasional expiratory wheezing auscultated bilaterally) present  No rhonchi or rales  Abdominal:      Palpations: Abdomen is soft  Tenderness: There is no abdominal tenderness  There is no guarding or rebound  Musculoskeletal:      Cervical back: Neck supple  Skin:     General: Skin is warm  Neurological:      General: No focal deficit present  Mental Status: He is alert and oriented to person, place, and time  Motor: Weakness present  Gait: Gait abnormal       Comments: Patient ambulating with a standard walker   Psychiatric:         Mood and Affect: Mood normal          Behavior: Behavior normal          Thought Content:  Thought content normal          Jt Monson, Diogenes Wyman

## 2022-08-29 ENCOUNTER — OFFICE VISIT (OUTPATIENT)
Dept: GASTROENTEROLOGY | Facility: CLINIC | Age: 57
End: 2022-08-29
Payer: COMMERCIAL

## 2022-08-29 ENCOUNTER — TELEPHONE (OUTPATIENT)
Dept: HEMATOLOGY ONCOLOGY | Facility: CLINIC | Age: 57
End: 2022-08-29

## 2022-08-29 VITALS
RESPIRATION RATE: 18 BRPM | HEIGHT: 66 IN | DIASTOLIC BLOOD PRESSURE: 75 MMHG | HEART RATE: 54 BPM | SYSTOLIC BLOOD PRESSURE: 90 MMHG | TEMPERATURE: 97.4 F | BODY MASS INDEX: 15.69 KG/M2 | WEIGHT: 97.6 LBS

## 2022-08-29 DIAGNOSIS — K85.90 ACUTE ON CHRONIC PANCREATITIS (HCC): ICD-10-CM

## 2022-08-29 DIAGNOSIS — K70.30 ALCOHOLIC CIRRHOSIS OF LIVER WITHOUT ASCITES (HCC): ICD-10-CM

## 2022-08-29 DIAGNOSIS — K86.3 PANCREATIC PSEUDOCYST: ICD-10-CM

## 2022-08-29 DIAGNOSIS — K86.1 ACUTE ON CHRONIC PANCREATITIS (HCC): ICD-10-CM

## 2022-08-29 PROCEDURE — 99244 OFF/OP CNSLTJ NEW/EST MOD 40: CPT | Performed by: INTERNAL MEDICINE

## 2022-08-29 NOTE — PROGRESS NOTES
[facility] - Outpatient Consultation  Martir Toro 65 y o  male MRN: [id number]  Encounter: [id number]          ASSESSMENT AND PLAN:      1  Alcoholic cirrhosis of liver without ascites ([facility] )  -     Ambulatory Referral to Gastroenterology    2  Pancreatic pseudocyst  -     Ambulatory Referral to Gastroenterology    3  Acute on chronic pancreatitis [facility])  -     Ambulatory Referral to Gastroenterology    This is a 52-year-old white male with a history of alcoholic cirrhosis  Currently he is not a candidate for evaluation of liver transplantation as he has only recently stopped drinking  He should have an ultrasound and an AFP every six months  He had a recent CT scan so therefore he does not need an ultrasound at this time  I will order an AFP though  He should have repeat colonoscopy and endoscopy in January of this coming year  If he continues to be alcohol free I would repeat his parameters and see what his meld score is and then determine whether he should be referred for liver transplantation  Thank you so much for referring this patient   ______________________________________________________________________    HPI:  Neeta Coates is a 52-year-old white male with a history of alcoholic cirrhosis of the liver  Currently he is a child's B with a meld score of 21  He stopped drinking two months ago  Currently he is alert and oriented x3  He also has a history of a pancreatic pseudocyst which is followed with follow-up MRI  He also had a colonoscopy two years ago which was recommended to have a repeat colonoscopy in three years  An endoscopy was consistent with Traore's esophagitis but there were no varices  This also was done two years ago  REVIEW OF SYSTEMS:    CONSTITUTIONAL: Denies any fever, chills, rigors, and weight loss  HEENT: No earache or tinnitus  Denies hearing loss or visual disturbances  CARDIOVASCULAR: No chest pain or palpitations     RESPIRATORY: Denies any cough, hemoptysis, shortness of breath or dyspnea on exertion  GASTROINTESTINAL: As noted in the History of Present Illness  GENITOURINARY: No problems with urination  Denies any hematuria or dysuria  NEUROLOGIC: No dizziness or vertigo, denies headaches  MUSCULOSKELETAL: Denies any muscle or joint pain  SKIN: Denies skin rashes or itching  ENDOCRINE: Denies excessive thirst  Denies intolerance to heat or cold  PSYCHOSOCIAL: Denies depression or anxiety  Denies any recent memory loss  Historical Information   Past Medical History:   Diagnosis Date    Alcohol abuse     Traore esophagus     Bowel obstruction (HCC)     Bowel perforation (HCC)     Cardiac disease     Continuous chronic alcoholism ([facility] ) 10/5/2017    COPD (chronic obstructive pulmonary disease) (HCC)     History of shoulder surgery     Right shoulder    History of transfusion     Hx of cervical spine surgery     Hypertension     Incisional hernia 10/8/2017    MI, old     Mitral regurgitation     Psychiatric disorder     [facility] )      Past Surgical History:   Procedure Laterality Date    APPENDECTOMY      BACK SURGERY      CHOLECYSTECTOMY      ESOPHAGOGASTRODUODENOSCOPY N/A 11/28/2016    Procedure: ESOPHAGOGASTRODUODENOSCOPY (EGD); Surgeon: Tory Griffin MD;  Location:  GI LAB;   Service:     GALLBLADDER SURGERY      LAPAROTOMY N/A 10/25/2016    Procedure: LAPAROTOMY EXPLORATORY;  Surgeon: Elyssa Reddy MD;  [facility]: [facility];  Service:    [place] MOUTH SURGERY      PERCUTANEOUS PINNING FEMORAL NECK FRACTURE      SHOULDER SURGERY Right     SHOULDER SURGERY      SMALL INTESTINE SURGERY      STOMACH SURGERY      bal surgery     Social History   Social History     Substance and Sexual Activity   Alcohol Use Yes    Alcohol/week: 42 0 standard drinks    Types: 42 Cans of beer per week    Comment: a six pack a day     Social History     Substance and Sexual Activity   Drug Use No     Social History Tobacco Use   Smoking Status Current Every Day Smoker    Packs/day: 3 00    Years: 35 00    Pack years: 105 00    Types: Cigarettes   Smokeless Tobacco Never Used     Family History   Problem Relation Age of Onset    Breast cancer Mother     Prostate cancer Father     Skin cancer Brother        Meds/Allergies       Current Outpatient Medications:     albuterol (2 5 mg/3 mL) 0 083 % nebulizer solution    albuterol (ACCUNEB) 0 63 MG/3ML nebulizer solution    albuterol (PROVENTIL HFA,VENTOLIN HFA) 90 mcg/act inhaler    bumetanide (BUMEX) 1 mg tablet    Cholecalciferol 25 MCG (1000 UT) tablet    cyanocobalamin (VITAMIN B-12) 100 mcg tablet    dextromethorphan-guaifenesin (MUCINEX DM)  MG per 12 hr tablet    diphenhydrAMINE (SOMINEX) 25 MG tablet    docusate sodium (COLACE) 100 mg capsule    ergocalciferol (VITAMIN D2) 50,000 units    esomeprazole (NexIUM) 40 MG capsule    folic acid (FOLVITE) 1 mg tablet    hydrocortisone (CORTEF) 10 mg tablet    hydrOXYzine HCL (ATARAX) 25 mg tablet    lactulose (CHRONULAC) 10 g/15 mL solution    levETIRAcetam (KEPPRA) 1000 MG tablet    lisinopril (ZESTRIL) 10 mg tablet    loratadine (Claritin Reditabs) 10 MG dissolvable tablet    LORazepam (ATIVAN) 0 5 mg tablet    Multiple Vitamin (TAB-A-BONI PO)    multivitamin (THERAGRAN) TABS    pancrelipase, Lip-Prot-Amyl, (CREON) 6,000 units delayed release capsule    spironolactone (ALDACTONE) 100 mg tablet    thiamine 100 MG tablet    polyethylene glycol (GOLYTELY) 4000 mL solution    potassium chloride (K-DUR,KLOR-CON) 20 mEq tablet    No Known Allergies        Objective     Blood pressure 90/75, pulse (!) 54, temperature (!) 97 4 °F (36 3 °C), resp  rate 18, height 5' 6" (1 676 m), weight 44 3 kg (97 lb 9 6 oz)  Body mass index is 15 75 kg/m²  PHYSICAL EXAM:      General Appearance:   Alert, cooperative, no distress   HEENT:   Normocephalic, atraumatic, anicteric       Neck:  Supple, symmetrical, trachea midline   Lungs:   Clear to auscultation bilaterally; no rales, rhonchi or wheezing; respirations unlabored    Heart[de-identified]   Regular rate and rhythm; no murmur, rub, or gallop  Abdomen:   Soft, non-tender, non-distended; normal bowel sounds; no masses, no organomegaly    Genitalia:   Deferred    Rectal:   Deferred    Extremities:  No cyanosis, clubbing or edema    Pulses:  2+ and symmetric    Skin:  No jaundice, rashes, or lesions    Lymph nodes:  No palpable cervical lymphadenopathy        Lab Results:   No visits with results within 1 Day(s) from this visit  Latest known visit with results is:   No results displayed because visit has over 200 results  Radiology Results:   No results found

## 2022-08-31 PROBLEM — M80.00XA OSTEOPOROSIS WITH CURRENT PATHOLOGICAL FRACTURE: Status: ACTIVE | Noted: 2022-08-31

## 2022-08-31 PROBLEM — R79.89 LOW SERUM CORTISOL LEVEL: Status: ACTIVE | Noted: 2022-05-24

## 2022-08-31 PROBLEM — E27.40 LOW SERUM CORTISOL LEVEL: Status: ACTIVE | Noted: 2022-05-24

## 2022-09-08 RX ORDER — LORAZEPAM 0.5 MG/1
0.5 TABLET ORAL EVERY 8 HOURS PRN
Qty: 30 TABLET | Refills: 0 | Status: CANCELLED | OUTPATIENT
Start: 2022-09-08

## 2022-09-13 ENCOUNTER — OFFICE VISIT (OUTPATIENT)
Dept: FAMILY MEDICINE CLINIC | Facility: CLINIC | Age: 57
End: 2022-09-13
Payer: COMMERCIAL

## 2022-09-13 VITALS
TEMPERATURE: 98 F | WEIGHT: 102 LBS | DIASTOLIC BLOOD PRESSURE: 68 MMHG | BODY MASS INDEX: 16.39 KG/M2 | HEIGHT: 66 IN | SYSTOLIC BLOOD PRESSURE: 110 MMHG | HEART RATE: 78 BPM | OXYGEN SATURATION: 98 %

## 2022-09-13 DIAGNOSIS — Z71.41 ENCOUNTER FOR COUNSELING AND SURVEILLANCE FOR ALCOHOL USE DISORDER: ICD-10-CM

## 2022-09-13 DIAGNOSIS — I10 ESSENTIAL HYPERTENSION: ICD-10-CM

## 2022-09-13 DIAGNOSIS — G47.00 INSOMNIA, UNSPECIFIED TYPE: Primary | ICD-10-CM

## 2022-09-13 DIAGNOSIS — Z72.0 TOBACCO USE: ICD-10-CM

## 2022-09-13 DIAGNOSIS — E44.0 MALNUTRITION OF MODERATE DEGREE (HCC): ICD-10-CM

## 2022-09-13 PROCEDURE — T1015 CLINIC SERVICE: HCPCS | Performed by: FAMILY MEDICINE

## 2022-09-13 RX ORDER — LORAZEPAM 0.5 MG/1
0.5 TABLET ORAL
Qty: 30 TABLET | Refills: 0 | Status: SHIPPED | OUTPATIENT
Start: 2022-09-13 | End: 2022-10-10 | Stop reason: SDUPTHER

## 2022-09-13 NOTE — PROGRESS NOTES
FAMILY MEDICINE OFFICE VISIT  [facility]      NAME: Andrade Gould  AGE: 65 y o  SEX: male    DATE OF ENCOUNTER: 9/13/2022    Assessment and Plan     1  Insomnia, unspecified type  Comments:  Trouble falling and staying asleep  Tried multiple medications for sleep in the past      Orders:  -     LORazepam (ATIVAN) 0 5 mg tablet; Take 1 tablet (0 5 mg total) by mouth daily at bedtime    2  Essential hypertension  Comments:  stable  within normal range  Continue current medication  3  Malnutrition of moderate degree (HCC)  Comments:  Continue high-calorie food intake 3 needs and 2 snack a day  Reports that he gained 5 lb recently  4  Tobacco use  Comments:  3-4 cigaretts per day  Came down from 5packs a week from 1 packa week  Not ready to quit  5  Encounter for counseling and surveillance for alcohol use disorder  Comments:  Sober for past 3 months  Drinking nonalcoholic beer  Denies cravings  Chief Complaint     Chief Complaint   Patient presents with    Medication Refill     Ativan        History of Present Illness     Patient is a  62-year-old male with hypertension, COPD, insomnia came to the office with his sister for medication refill  Patient reports of trouble falling asleep and staying asleep  Tried both over-the-counter and prescription medications for insomnia in the past and reports nothing is working  He was given lorazepam 0 5 mg during hospital stay and in the nursing home and would like to continue the same as a sleep aid  Currently patient is staying with his brother and reports of adequate nutrition intake including high-calorie food and reports recent weight gain of 5 lb  Reports of being sober from last 3 months and cutting down on cigarettes from 5 packs a week to 1 pack a week, not yet ready to quit  Requests  handicap placard form          The following portions of the patient's history were reviewed and updated as appropriate: allergies, current medications, past family history, past medical history, past social history, past surgical history and problem list     Review of Systems     Review of Systems   Constitutional: Negative for activity change, chills and fever  HENT: Negative for congestion, ear discharge, ear pain, rhinorrhea, trouble swallowing and voice change  Eyes: Negative for visual disturbance  Respiratory: Negative for cough, chest tightness, shortness of breath and wheezing  Cardiovascular: Negative for chest pain, palpitations and leg swelling  Gastrointestinal: Negative for abdominal pain, constipation, diarrhea, nausea and vomiting  Genitourinary: Negative for difficulty urinating  Skin: Negative for color change  Neurological: Negative for dizziness, light-headedness and headaches  Psychiatric/Behavioral: Positive for sleep disturbance  Negative for agitation and confusion  All other systems reviewed and are negative        Active Problem List     Patient Active Problem List   Diagnosis    Tobacco abuse    Essential hypertension    H/O suicide attempt    H/O cervical spine surgery    Hyponatremia    Dietary folate deficiency anemia    Ventral hernia without obstruction or gangrene    Hepatomegaly    Hepatic steatosis    Hypomagnesemia    Elevated alkaline phosphatase level    Alcoholic hepatitis without ascites    Vitamin D deficiency    Iron deficiency    Urinary retention    Bladder wall thickening    Hypophosphatemia    Generalized weakness    Malnutrition of moderate degree (HCC)    Hypokalemia    Continuous chronic alcoholism (HCC)    Incisional hernia    Hx of seizure disorder    Seizure-like activity (HCC)    Diarrhea    Abnormality of pancreatic duct    Allergic rhinitis    Microcytic anemia    Abdominal wound dehiscence    Cervical disc disorder with myelopathy    Cervical spinal stenosis    Depression    Left foot drop    Lumbar radiculopathy    Neuropathy involving both lower extremities    Peripheral neuropathy    T6 vertebral fracture (HCC)    Alcoholic cirrhosis of liver without ascites (HCC)    Thrombocytopenia (HCC)    Closed fracture of left olecranon process, initial encounter    Iron deficiency anemia due to chronic blood loss    Duodenal ulcer    Tubular adenoma    Traore esophagus    Celiac artery stenosis (HCC)    Pancreatic mass    Pancytopenia (HCC)    Constipation    Transaminitis    Lesion of spleen    Left anterior fascicular block    Abnormal EKG    Acute on chronic pancreatitis (HCC)    Pancreatic pseudocyst    COPD (chronic obstructive pulmonary disease) (HCC)    Ileus ([address] )    Ambulatory dysfunction    Current every day smoker    Fall    Hx of duodenal ulcer    Neck pain    Alcohol use    Thoracic compression fracture (HCC)    Aortic ectasia (HCC)    Rib fractures    Seizure disorder (HCC)    Hypoxia    Traroe's esophagus    Elevated d-dimer    COVID-19 virus infection    Hypocalcemia    Clavicular fracture    Low serum cortisol level (HCC)    Chronic anemia    Osteoporosis with current pathological fracture       Objective     /68 (BP Location: Left arm, Patient Position: Sitting)   Pulse 78   Temp 98 °F (36 7 °C)   Ht 5' 6" (1 676 m)   Wt 46 3 kg (102 lb)   SpO2 98%   BMI 16 46 kg/m²     Physical Exam  Vitals and nursing note reviewed  Exam conducted with a chaperone present  Constitutional:       General: He is not in acute distress  Appearance: Normal appearance  HENT:      Head: Normocephalic and atraumatic  Right Ear: External ear normal       Left Ear: External ear normal       Nose: Nose normal  No congestion or rhinorrhea  Mouth/Throat:      Mouth: Mucous membranes are moist       Pharynx: Oropharynx is clear  Eyes:      Conjunctiva/sclera: Conjunctivae normal       Pupils: Pupils are equal, round, and reactive to light     Cardiovascular:      Rate and Rhythm: Normal rate and regular rhythm  Pulses: Normal pulses  Heart sounds: Normal heart sounds  No murmur heard  Pulmonary:      Effort: Pulmonary effort is normal       Breath sounds: Normal breath sounds  No wheezing  Abdominal:      General: Abdomen is flat  Bowel sounds are normal       Palpations: Abdomen is soft  Tenderness: There is no abdominal tenderness  Musculoskeletal:         General: Deformity present  Cervical back: Neck supple  No tenderness  Right lower leg: No edema  Left lower leg: No edema  Skin:     General: Skin is warm and dry  Capillary Refill: Capillary refill takes less than 2 seconds  Neurological:      General: No focal deficit present  Mental Status: He is alert and oriented to person, place, and time  Mental status is at baseline     Psychiatric:         Behavior: Behavior normal          Pertinent Laboratory/Diagnostic Studies:            Current Medications     Current Outpatient Medications:     albuterol (2 5 mg/3 mL) 0 083 % nebulizer solution, Take 2 5 mg by nebulization every 6 (six) hours as needed for wheezing or shortness of breath, Disp: , Rfl:     albuterol (ACCUNEB) 0 63 MG/3ML nebulizer solution, , Disp: , Rfl:     albuterol (PROVENTIL HFA,VENTOLIN HFA) 90 mcg/act inhaler, Inhale 2 puffs every 4 (four) hours as needed for wheezing or shortness of breath, Disp: 18 g, Rfl: 2    bumetanide (BUMEX) 1 mg tablet, Take 1 tablet (1 mg total) by mouth 2 (two) times a day, Disp: 60 tablet, Rfl: 2    Cholecalciferol 25 MCG (1000 UT) tablet, Take 1 tablet (1,000 Units total) by mouth daily, Disp: 30 tablet, Rfl: 0    cyanocobalamin (VITAMIN B-12) 100 mcg tablet, Take 1 tablet (100 mcg total) by mouth daily, Disp: 30 tablet, Rfl: 5    dextromethorphan-guaifenesin (MUCINEX DM)  MG per 12 hr tablet, Take 1 tablet by mouth every 12 (twelve) hours, Disp: 14 tablet, Rfl: 0    diphenhydrAMINE (SOMINEX) 25 MG tablet, Take 25 mg by mouth, Disp: , Rfl:     docusate sodium (COLACE) 100 mg capsule, Take 1 capsule (100 mg total) by mouth 2 (two) times a day as needed for constipation, Disp: 30 capsule, Rfl: 0    ergocalciferol (VITAMIN D2) 50,000 units, Take 1 capsule (50,000 Units total) by mouth once a week, Disp: 13 capsule, Rfl: 1    esomeprazole (NexIUM) 40 MG capsule, Take 1 capsule (40 mg total) by mouth 2 (two) times a day before meals, Disp: 60 capsule, Rfl: 3    folic acid (FOLVITE) 1 mg tablet, Take 1 tablet (1 mg total) by mouth daily, Disp: 30 tablet, Rfl: 0    hydrocortisone (CORTEF) 10 mg tablet, Take 5 tablets (50 mg total) by mouth 2 (two) times a day, Disp: 60 tablet, Rfl: 0    hydrOXYzine HCL (ATARAX) 25 mg tablet, Take 1 tablet (25 mg total) by mouth every 6 (six) hours as needed for itching, allergies or anxiety, Disp: 90 tablet, Rfl: 0    lactulose (CHRONULAC) 10 g/15 mL solution, , Disp: , Rfl:     levETIRAcetam (KEPPRA) 1000 MG tablet, Take 1 tablet (1,000 mg total) by mouth every 12 (twelve) hours, Disp: 60 tablet, Rfl: 2    lisinopril (ZESTRIL) 10 mg tablet, Take 1 tablet (10 mg total) by mouth daily, Disp: 30 tablet, Rfl: 5    loratadine (Claritin Reditabs) 10 MG dissolvable tablet, Take 10 mg by mouth daily, Disp: , Rfl:     LORazepam (ATIVAN) 0 5 mg tablet, Take 1 tablet (0 5 mg total) by mouth daily at bedtime, Disp: 30 tablet, Rfl: 0    Multiple Vitamin (TAB-A-BONI PO), Take 1 tablet by mouth daily, Disp: , Rfl:     multivitamin (THERAGRAN) TABS, Take 1 tablet by mouth daily, Disp: , Rfl:     pancrelipase, Lip-Prot-Amyl, (CREON) 6,000 units delayed release capsule, Take 6,000 units of lipase by mouth 3 (three) times a day with meals, Disp: 90 capsule, Rfl: 2    spironolactone (ALDACTONE) 100 mg tablet, Take 1 tablet (100 mg total) by mouth daily, Disp: 90 tablet, Rfl: 3    thiamine 100 MG tablet, Take 1 tablet (100 mg total) by mouth daily, Disp: 30 tablet, Rfl: 0    polyethylene glycol (GOLYTELY) 4000 mL solution, Take 4,000 mL by mouth once for 1 dose (Patient not taking: No sig reported), Disp: 4000 mL, Rfl: 0    potassium chloride (K-DUR,KLOR-CON) 20 mEq tablet, Take 1 tablet (20 mEq total) by mouth daily for 1 dose (Patient not taking: No sig reported), Disp: 2 tablet, Rfl: 0    Health Maintenance     Health Maintenance   Topic Date Due    COVID-19 Vaccine (1) Never done    Hepatitis A Vaccine (1 of 2 - Risk 2-dose series) Never done    Pneumococcal Vaccine: Pediatrics (0 to 5 Years) and At-Risk Patients (6 to 59 Years) (1 - PCV) Never done    Hepatitis B Vaccine (1 of 3 - Risk 3-dose series) Never done    Influenza Vaccine (1) 09/01/2022    Colorectal Cancer Screening  01/22/2023    Annual Physical  04/22/2023    Lung Cancer Screening  05/23/2023    BMI: Followup Plan  09/13/2023    BMI: Adult  09/13/2023    DTaP,Tdap,and Td Vaccines (2 - Td or Tdap) 07/28/2027    HIV Screening  Completed    Hepatitis C Screening  Completed    HIB Vaccine  Aged Out    IPV Vaccine  Aged Out    Meningococcal ACWY Vaccine  Aged Out    HPV Vaccine  Aged Out     Immunization History   Administered Date(s) Administered    Influenza, recombinant, quadrivalent,injectable, preservative free 12/02/2020    Tdap 07/28/2017     BMI Counseling: Body mass index is 16 46 kg/m²  The BMI is below normal  Patient advised to gain weight and dietary education for weight gain was provided  Rationale for BMI follow-up plan is due to patient being underweight  Tobacco Cessation Counseling: Tobacco cessation counseling was provided   The patient is sincerely urged to quit consumption of tobacco  He is not ready to quit tobacco           Winter Peterson MD   Family Medicine  PGY-2  9/13/2022 10:15 AM

## 2022-09-15 ENCOUNTER — EVALUATION (OUTPATIENT)
Dept: PHYSICAL THERAPY | Facility: CLINIC | Age: 57
End: 2022-09-15
Payer: COMMERCIAL

## 2022-09-15 ENCOUNTER — APPOINTMENT (OUTPATIENT)
Dept: LAB | Facility: MEDICAL CENTER | Age: 57
End: 2022-09-15
Payer: COMMERCIAL

## 2022-09-15 DIAGNOSIS — M80.00XS OSTEOPOROSIS WITH CURRENT PATHOLOGICAL FRACTURE, UNSPECIFIED OSTEOPOROSIS TYPE, SEQUELA: ICD-10-CM

## 2022-09-15 DIAGNOSIS — R79.89 LOW SERUM CORTISOL LEVEL: ICD-10-CM

## 2022-09-15 DIAGNOSIS — E83.42 HYPOMAGNESEMIA: ICD-10-CM

## 2022-09-15 DIAGNOSIS — K70.30 ALCOHOLIC CIRRHOSIS OF LIVER WITHOUT ASCITES (HCC): ICD-10-CM

## 2022-09-15 DIAGNOSIS — D50.0 IRON DEFICIENCY ANEMIA DUE TO CHRONIC BLOOD LOSS: ICD-10-CM

## 2022-09-15 DIAGNOSIS — E87.6 HYPOKALEMIA: ICD-10-CM

## 2022-09-15 DIAGNOSIS — R26.2 AMBULATORY DYSFUNCTION: ICD-10-CM

## 2022-09-15 DIAGNOSIS — E55.9 VITAMIN D DEFICIENCY: ICD-10-CM

## 2022-09-15 DIAGNOSIS — E87.1 HYPONATREMIA: ICD-10-CM

## 2022-09-15 LAB
25(OH)D3 SERPL-MCNC: 36.3 NG/ML (ref 30–100)
AFP-TM SERPL-MCNC: 2.7 NG/ML (ref 0.5–8)
ALBUMIN SERPL BCP-MCNC: 3.3 G/DL (ref 3.5–5)
ALP SERPL-CCNC: 188 U/L (ref 46–116)
ALT SERPL W P-5'-P-CCNC: 18 U/L (ref 12–78)
ANION GAP SERPL CALCULATED.3IONS-SCNC: 7 MMOL/L (ref 4–13)
AST SERPL W P-5'-P-CCNC: 24 U/L (ref 5–45)
BASOPHILS # BLD AUTO: 0.04 THOUSANDS/ΜL (ref 0–0.1)
BASOPHILS NFR BLD AUTO: 1 % (ref 0–1)
BILIRUB SERPL-MCNC: 0.56 MG/DL (ref 0.2–1)
BUN SERPL-MCNC: 25 MG/DL (ref 5–25)
CALCIUM ALBUM COR SERPL-MCNC: 9.9 MG/DL (ref 8.3–10.1)
CALCIUM SERPL-MCNC: 9.3 MG/DL (ref 8.3–10.1)
CHLORIDE SERPL-SCNC: 87 MMOL/L (ref 96–108)
CHOLEST SERPL-MCNC: 141 MG/DL
CO2 SERPL-SCNC: 28 MMOL/L (ref 21–32)
CORTIS AM PEAK SERPL-MCNC: 19.3 UG/DL (ref 4.2–22.4)
CREAT SERPL-MCNC: 1.17 MG/DL (ref 0.6–1.3)
EOSINOPHIL # BLD AUTO: 0.08 THOUSAND/ΜL (ref 0–0.61)
EOSINOPHIL NFR BLD AUTO: 1 % (ref 0–6)
ERYTHROCYTE [DISTWIDTH] IN BLOOD BY AUTOMATED COUNT: 16.5 % (ref 11.6–15.1)
FOLATE SERPL-MCNC: 15.5 NG/ML (ref 3.1–17.5)
GFR SERPL CREATININE-BSD FRML MDRD: 69 ML/MIN/1.73SQ M
GLUCOSE P FAST SERPL-MCNC: 107 MG/DL (ref 65–99)
HCT VFR BLD AUTO: 36.1 % (ref 36.5–49.3)
HDLC SERPL-MCNC: 57 MG/DL
HGB BLD-MCNC: 12.2 G/DL (ref 12–17)
IMM GRANULOCYTES # BLD AUTO: 0.02 THOUSAND/UL (ref 0–0.2)
IMM GRANULOCYTES NFR BLD AUTO: 0 % (ref 0–2)
LDLC SERPL CALC-MCNC: 75 MG/DL (ref 0–100)
LYMPHOCYTES # BLD AUTO: 1.28 THOUSANDS/ΜL (ref 0.6–4.47)
LYMPHOCYTES NFR BLD AUTO: 20 % (ref 14–44)
MAGNESIUM SERPL-MCNC: 1.6 MG/DL (ref 1.6–2.6)
MCH RBC QN AUTO: 31.4 PG (ref 26.8–34.3)
MCHC RBC AUTO-ENTMCNC: 33.8 G/DL (ref 31.4–37.4)
MCV RBC AUTO: 93 FL (ref 82–98)
MONOCYTES # BLD AUTO: 0.81 THOUSAND/ΜL (ref 0.17–1.22)
MONOCYTES NFR BLD AUTO: 13 % (ref 4–12)
NEUTROPHILS # BLD AUTO: 4.19 THOUSANDS/ΜL (ref 1.85–7.62)
NEUTS SEG NFR BLD AUTO: 65 % (ref 43–75)
NONHDLC SERPL-MCNC: 84 MG/DL
NRBC BLD AUTO-RTO: 0 /100 WBCS
PLATELET # BLD AUTO: 195 THOUSANDS/UL (ref 149–390)
PMV BLD AUTO: 9.6 FL (ref 8.9–12.7)
POTASSIUM SERPL-SCNC: 3.9 MMOL/L (ref 3.5–5.3)
PROT SERPL-MCNC: 8.3 G/DL (ref 6.4–8.4)
PTH-INTACT SERPL-MCNC: 44.7 PG/ML (ref 18.4–80.1)
RBC # BLD AUTO: 3.89 MILLION/UL (ref 3.88–5.62)
SODIUM SERPL-SCNC: 122 MMOL/L (ref 135–147)
TRIGL SERPL-MCNC: 45 MG/DL
VIT B12 SERPL-MCNC: 393 PG/ML (ref 100–900)
WBC # BLD AUTO: 6.42 THOUSAND/UL (ref 4.31–10.16)

## 2022-09-15 PROCEDURE — 82607 VITAMIN B-12: CPT

## 2022-09-15 PROCEDURE — 83735 ASSAY OF MAGNESIUM: CPT

## 2022-09-15 PROCEDURE — 97110 THERAPEUTIC EXERCISES: CPT

## 2022-09-15 PROCEDURE — 80061 LIPID PANEL: CPT

## 2022-09-15 PROCEDURE — 85025 COMPLETE CBC W/AUTO DIFF WBC: CPT

## 2022-09-15 PROCEDURE — 82024 ASSAY OF ACTH: CPT

## 2022-09-15 PROCEDURE — 82105 ALPHA-FETOPROTEIN SERUM: CPT

## 2022-09-15 PROCEDURE — 82746 ASSAY OF FOLIC ACID SERUM: CPT

## 2022-09-15 PROCEDURE — 83970 ASSAY OF PARATHORMONE: CPT

## 2022-09-15 PROCEDURE — 36415 COLL VENOUS BLD VENIPUNCTURE: CPT

## 2022-09-15 PROCEDURE — 80053 COMPREHEN METABOLIC PANEL: CPT

## 2022-09-15 PROCEDURE — 82533 TOTAL CORTISOL: CPT

## 2022-09-15 PROCEDURE — 82306 VITAMIN D 25 HYDROXY: CPT

## 2022-09-15 PROCEDURE — 97535 SELF CARE MNGMENT TRAINING: CPT

## 2022-09-15 PROCEDURE — 97161 PT EVAL LOW COMPLEX 20 MIN: CPT

## 2022-09-15 NOTE — PROGRESS NOTES
PT Evaluation     Today's date: 9/15/2022  Patient name: Rachel Buckley  : 1965  MRN: [id number]  Referring provider: Mecca Ng DO  Dx:   Encounter Diagnosis     ICD-10-CM    1  Ambulatory dysfunction  R26 2 Ambulatory Referral to Physical Therapy       Start Time: 0900  Stop Time: 0955  Total time in clinic (min): 55 minutes    Assessment  Assessment details: Patient is a 64 yo male with medical diagnosis of gait dysfunction  Following a thorough physical therapy evaluation, the patient presents impaired gait, decreased LE strength (L>R), impaired balance and decreased performance on functional testing  Functionally, these findings indicate he is at high risk of falls and will benefit from skilled PT services to address the aforementioned impairments and functional deficits, accomplish patient goals and decrease risk of falls  Reviewed safety techniques for at home  Recommended patient utilize RW at all time for ambulation and push up with arms to decrease risk of falls  He verbalized understanding of education provided  Impairments: abnormal gait, abnormal or restricted ROM, abnormal movement, activity intolerance, impaired balance, impaired physical strength, pain with function and safety issue    Goals  STG (3 weeks): Improve Five Time Sit to Stand by  2 3"  Increase LE strength by 1/2 grade  LTG (6 weeks):  TUG <13 5"  Five Time Sit to Stand <13  6"  LE Strength >=4+5  Patient will be independent with HEP in 6 weeks to allow independent management of condition  Plan  Plan details: TE, NMR, TA, MT, self-care, and modalities as needed in order to progress through skilled PT focused on ROM, strength, balance, motor control     Patient would benefit from: skilled physical therapy  Planned modality interventions: cryotherapy and thermotherapy: hydrocollator packs  Planned therapy interventions: manual therapy, neuromuscular re-education, patient education, self care, therapeutic activities, therapeutic exercise, home exercise program and gait training  Frequency: 2x week  Duration in weeks: 6  Treatment plan discussed with: patient        Subjective Evaluation    History of Present Illness  Mechanism of injury: Patient is a 64 yo male presenting with balance and gait dysfunction  Patient was coming [facility] prior to being hospitalized for a fall in 2022  Was hospitalized for several weeks and admitted to a nursing home for PT  Followed up with his family doctor after SNF and prescribed PT  Most recent fall was about 3 weeks ago, reports he "only bumped my eye " Uses a RW for all ambulation at home and in the community  Rarely, "takes very short trips without it (his RW), but theres always something to grab onto if I do this " PMHx is (+) for seizure disorder and history of back fractures  Patients primary goals for PT are to increase strength, improve balance, improve gait and decrease risk of falls     Pain  Current pain ratin  At worst pain rating: 10  Location: low back pain    Social Support  Steps to enter house: yes (7 TIFF c/ u/l right ascending railing)  Stairs in house: no   Lives in: [facility] (walk-in shower and shower chair)    Treatments  Previous treatment: physical therapy  Patient Goals  Patient goals for therapy: improved balance, increased strength, return to sport/leisure activities and independence with ADLs/IADLs          Objective   Gait:  Wide MCKENZIE, uses RW, unsteady when turning or on inclined surface   Transfers:     -Sit to Stand: Ind c/ increased time and use of b/l UE     -Sit <> Supine: Ind c/ increased time for lifting of LLE onto and off table       Balance:   Assess at future visit       TU" c/ RW and b/l UE to stand  Five Time Sit to Stand: 23" to RW c/ b/l UE use     MMT (R/L):  Hip FLX: 4-/3-  Hip ABD: 3+/3-  Knee EXT: 3+/3  Knee FLX: 3+/3-  Ankle DF: 4- /3+           Precautions: FALL RISK, Use cushion on chairs for coccyx, Hx of Seizures, 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      Manuals 9/15             Flexibility/PROM                                               Neuro Re-Ed               NBOS               SLS               Tandem Walking                Side Stepping         Hurdles: Fwd & Lat               Step-ups: Fwd               Step-ups: Lat               Sit to Stands                                 Ther Ex               NuStep nv              SLR 1x5ea             clamshells               Leg Press               Standing: HR Reviewed Seated for HEP        Seated: TR Reviewed Seated for HEP        LAQ         HC               Standing: Hip FLX              Standing: Hip ABD              Standing: Hip Ext              Seated FLX c/ SB                         Ther Activity               Stair Negotiation               Obstacle Course                                           Gait Training               Ambulation c/ SPC                               Modalities                                               Self Care:  Reviewed safety technique for at home  Recommended patient utilize RW at all time for ambulation and push up with arms to decrease risk of falls  He verbalized understanding of education provided

## 2022-09-16 LAB — ACTH PLAS-MCNC: 18.3 PG/ML (ref 7.2–63.3)

## 2022-09-21 ENCOUNTER — OFFICE VISIT (OUTPATIENT)
Dept: PHYSICAL THERAPY | Facility: CLINIC | Age: 57
End: 2022-09-21
Payer: COMMERCIAL

## 2022-09-21 DIAGNOSIS — R26.2 AMBULATORY DYSFUNCTION: Primary | ICD-10-CM

## 2022-09-21 PROCEDURE — 97110 THERAPEUTIC EXERCISES: CPT

## 2022-09-21 PROCEDURE — 97140 MANUAL THERAPY 1/> REGIONS: CPT

## 2022-09-21 NOTE — PROGRESS NOTES
Daily Note     Today's date: 2022  Patient name: Tarik Cárdenas  : 1965  MRN: [id number]  Referring provider: Sakina Downs DO  Dx:   Encounter Diagnosis     ICD-10-CM    1  Ambulatory dysfunction  R26 2                   Subjective: Patient reports that he is sore ans stiff this morning  He has been compliant with HEP  R LE stronger than L LE  Objective: See treatment diary below  Initiated strength program today  Assessment: Tolerated treatment well  Patient demonstrated fatigue post treatment and would benefit from continued PT to improve strength for functionality  His L LE has significant weakness and requires Min/Mod A for SLR  Will progress as patient is able an to tolerance  Discussed with patient the importance of using RW at all times in home and public to reduce the risk of falls  Plan: Continue per plan of care        Precautions: FALL RISK, Use cushion on chairs for coccyx, Hx of Seizures, 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      Manuals 9/15  9/21           Flexibility/PROM    HS/calves- CM                                           Neuro Re-Ed               NBOS               SLS               Tandem Walking                Side Stepping         Hurdles: Fwd & Lat               Step-ups: Fwd               Step-ups: Lat               Sit to Stands                                 Ther Ex               NuStep nv   L4            SLR 1x5ea AAROM L 2x5   Active R 2x5           Clamshells    Supine L1 2x10                Quad Sets    5"x10 ea           Standing: HR Reviewed Seated for HEP Standing HR 15x       Seated: TR Reviewed Seated for HEP Seated TR 10x        LAQ  10x ea       SAQ  2x10 ea       HC               Standing: Hip FLX              Standing: Hip ABD              Standing: Hip Ext              Seated FLX c/ SB                         Ther Activity               Stair Negotiation               Obstacle Course                                           Gait Training               Ambulation c/ SPC                               Modalities

## 2022-09-26 ENCOUNTER — OFFICE VISIT (OUTPATIENT)
Dept: FAMILY MEDICINE CLINIC | Facility: CLINIC | Age: 57
End: 2022-09-26
Payer: COMMERCIAL

## 2022-09-26 VITALS
RESPIRATION RATE: 18 BRPM | HEART RATE: 64 BPM | DIASTOLIC BLOOD PRESSURE: 70 MMHG | SYSTOLIC BLOOD PRESSURE: 94 MMHG | HEIGHT: 66 IN | WEIGHT: 107 LBS | OXYGEN SATURATION: 97 % | TEMPERATURE: 97.3 F | BODY MASS INDEX: 17.19 KG/M2

## 2022-09-26 DIAGNOSIS — E87.1 CHRONIC HYPONATREMIA: Primary | ICD-10-CM

## 2022-09-26 DIAGNOSIS — Z23 NEED FOR IMMUNIZATION AGAINST INFLUENZA: ICD-10-CM

## 2022-09-26 DIAGNOSIS — Z23 NEED FOR VACCINATION WITH TWINRIX: ICD-10-CM

## 2022-09-26 DIAGNOSIS — M25.562 LEFT MEDIAL KNEE PAIN: ICD-10-CM

## 2022-09-26 DIAGNOSIS — K70.30 ALCOHOLIC CIRRHOSIS OF LIVER WITHOUT ASCITES (HCC): ICD-10-CM

## 2022-09-26 PROCEDURE — T1015 CLINIC SERVICE: HCPCS | Performed by: FAMILY MEDICINE

## 2022-09-26 RX ORDER — SODIUM CHLORIDE 1000 MG
1 TABLET, SOLUBLE MISCELLANEOUS 3 TIMES DAILY
Qty: 90 TABLET | Refills: 1 | Status: SHIPPED | OUTPATIENT
Start: 2022-09-26 | End: 2022-10-24 | Stop reason: SINTOL

## 2022-09-26 NOTE — PROGRESS NOTES
Assessment/Plan     Diagnoses and all orders for this visit:    Chronic hyponatremia  Comments:  clinically euvolemic  no edema or ascites on exam  start NaCl tabs, recheck CMP in 2-3 weeks  Orders:  -     sodium chloride 1 g tablet; Take 1 tablet (1 g total) by mouth 3 (three) times a day  -     Comprehensive metabolic panel; Future    Alcoholic cirrhosis of liver without ascites (HCC)  Comments:  advised to continue avoiding EtOH  continue f/u with GI     Left medial knee pain  Comments:  suspect OA  no ligamentous abnormalities on exam  trial of topical voltaren   advised home exercises and stretches  Orders:  -     Diclofenac Sodium (VOLTAREN) 1 %; Apply 2 g topically 4 (four) times a day    Need for immunization against influenza  Comments:  patient declined    Need for vaccination with Twinrix  Comments:  patient declined      The patient indicates understanding of these issues and agrees with the plan  Return in about 4 weeks (around 10/24/2022) for Recheck  Subjective     Patient Id: Salty  is a 65 y o  male    HPI    Here today to f/u on recent lab work  Labs showed hyponatremia  He reports no edema or abdominal distention  Notes good adherence with medications  Urinating normal and having normal bowel movements  He denies any seizure like activity or confusion  Was previously on sodium tabs for chronic hyponatremia but was discontinued during hospitalization at some point  He reports he stopped drinking alcoholic but does report drinking 5-88 non-alcoholic beers per day as well as occasional water and gatorade  He does note that he also woke up this morning with left knee pain  No known injury or trauma  No swelling or redness  He does report he has a "floating knee cap"  Currently taking tylenol which provides a little relief  Otherwise patient reports he feels great and has no other concerns today  Review of Systems   Constitutional: Negative for chills and fever     Respiratory: Negative for cough, chest tightness, shortness of breath and wheezing  Cardiovascular: Negative for chest pain, palpitations and leg swelling  Gastrointestinal: Negative for abdominal pain, constipation, diarrhea, nausea and vomiting  Genitourinary: Negative for dysuria  Musculoskeletal: Positive for arthralgias and gait problem  Negative for joint swelling and myalgias  Skin: Negative for rash  Neurological: Negative for headaches  Psychiatric/Behavioral: Negative for dysphoric mood  All other systems reviewed and are negative  Past Medical History:   Diagnosis Date    Alcohol abuse     Traore esophagus     Bowel obstruction (HCC)     Bowel perforation (HCC)     Cardiac disease     [facility] chronic [facility]ism ([facility] [address] ) 10/5/2017    COPD (chronic obstructive pulmonary disease) (HCC)     History of shoulder surgery     Right shoulder    History of transfusion     Hx of cervical spine surgery     Hypertension     Incisional hernia 10/8/2017    MI, old     Mitral regurgitation     Psychiatric disorder     Seizures ([facility] [address] )        Past Surgical History:   Procedure Laterality Date    APPENDECTOMY      BACK SURGERY      CHOLECYSTECTOMY      ESOPHAGOGASTRODUODENOSCOPY N/A 11/28/2016    Procedure: ESOPHAGOGASTRODUODENOSCOPY (EGD); Surgeon: Taylor Lay MD;  Location:  GI LAB;   Service:    [address]      LAPAROTOMY N/A 10/25/2016    Procedure: LAPAROTOMY EXPLORATORY;  Surgeon: Tl Orozco MD;  Location: [facility];  Service:    Ard[facility] Needs MOUTH SURGERY      PERCUTANEOUS PINNING FEMORAL NECK FRACTURE      SHOULDER SURGERY Right     SHOULDER SURGERY      SMALL INTESTINE SURGERY      STOMACH SURGERY      bal surgery       Family History   Problem Relation Age of Onset    Breast cancer Mother     Prostate cancer Father     Skin cancer Brother        No Known Allergies      Current Outpatient Medications:     albuterol (2 5 mg/3 mL) 0 083 % nebulizer solution, Take 2 5 mg by nebulization every 6 (six) hours as needed for wheezing or shortness of breath, Disp: , Rfl:     albuterol (ACCUNEB) 0 63 MG/3ML nebulizer solution, , Disp: , Rfl:     albuterol (PROVENTIL HFA,VENTOLIN HFA) 90 mcg/act inhaler, Inhale 2 puffs every 4 (four) hours as needed for wheezing or shortness of breath, Disp: 18 g, Rfl: 2    bumetanide (BUMEX) 1 mg tablet, Take 1 tablet (1 mg total) by mouth 2 (two) times a day, Disp: 60 tablet, Rfl: 2    Cholecalciferol 25 MCG (1000 UT) tablet, Take 1 tablet (1,000 Units total) by mouth daily, Disp: 30 tablet, Rfl: 0    cyanocobalamin (VITAMIN B-12) 100 mcg tablet, Take 1 tablet (100 mcg total) by mouth daily, Disp: 30 tablet, Rfl: 5    dextromethorphan-guaifenesin (MUCINEX DM)  MG per 12 hr tablet, Take 1 tablet by mouth every 12 (twelve) hours, Disp: 14 tablet, Rfl: 0    Diclofenac Sodium (VOLTAREN) 1 %, Apply 2 g topically 4 (four) times a day, Disp: 100 g, Rfl: 1    diphenhydrAMINE (SOMINEX) 25 MG tablet, Take 25 mg by mouth, Disp: , Rfl:     docusate sodium (COLACE) 100 mg capsule, Take 1 capsule (100 mg total) by mouth 2 (two) times a day as needed for constipation, Disp: 30 capsule, Rfl: 0    ergocalciferol (VITAMIN D2) 50,000 units, Take 1 capsule (50,000 Units total) by mouth once a week, Disp: 13 capsule, Rfl: 1    esomeprazole (NexIUM) 40 MG capsule, Take 1 capsule (40 mg total) by mouth 2 (two) times a day before meals, Disp: 60 capsule, Rfl: 3    folic acid (FOLVITE) 1 mg tablet, Take 1 tablet (1 mg total) by mouth daily, Disp: 30 tablet, Rfl: 0    hydrocortisone (CORTEF) 10 mg tablet, Take 5 tablets (50 mg total) by mouth 2 (two) times a day, Disp: 60 tablet, Rfl: 0    hydrOXYzine HCL (ATARAX) 25 mg tablet, Take 1 tablet (25 mg total) by mouth every 6 (six) hours as needed for itching, allergies or anxiety, Disp: 90 tablet, Rfl: 0    lactulose (CHRONULAC) 10 g/15 mL solution, , Disp: , Rfl:     levETIRAcetam (KEPPRA) 1000 MG tablet, Take 1 tablet (1,000 mg total) by mouth every 12 (twelve) hours, Disp: 60 tablet, Rfl: 2    lisinopril (ZESTRIL) 10 mg tablet, Take 1 tablet (10 mg total) by mouth daily, Disp: 30 tablet, Rfl: 5    loratadine (Claritin Reditabs) 10 MG dissolvable tablet, Take 10 mg by mouth daily, Disp: , Rfl:     LORazepam (ATIVAN) 0 5 mg tablet, Take 1 tablet (0 5 mg total) by mouth daily at bedtime, Disp: 30 tablet, Rfl: 0    Multiple Vitamin (TAB-A-BONI PO), Take 1 tablet by mouth daily, Disp: , Rfl:     multivitamin (THERAGRAN) TABS, Take 1 tablet by mouth daily, Disp: , Rfl:     pancrelipase, Lip-Prot-Amyl, (CREON) 6,000 units delayed release capsule, Take 6,000 units of lipase by mouth 3 (three) times a day with meals, Disp: 90 capsule, Rfl: 2    sodium chloride 1 g tablet, Take 1 tablet (1 g total) by mouth 3 (three) times a day, Disp: 90 tablet, Rfl: 1    spironolactone (ALDACTONE) 100 mg tablet, Take 1 tablet (100 mg total) by mouth daily, Disp: 90 tablet, Rfl: 3    thiamine 100 MG tablet, Take 1 tablet (100 mg total) by mouth daily, Disp: 30 tablet, Rfl: 0    polyethylene glycol (GOLYTELY) 4000 mL solution, Take 4,000 mL by mouth once for 1 dose (Patient not taking: No sig reported), Disp: 4000 mL, Rfl: 0    potassium chloride (K-DUR,KLOR-CON) 20 mEq tablet, Take 1 tablet (20 mEq total) by mouth daily for 1 dose (Patient not taking: No sig reported), Disp: 2 tablet, Rfl: 0    Objective     Vitals:    09/26/22 0930   BP: 94/70   Pulse: 64   Resp: 18   Temp: (!) 97 3 °F (36 3 °C)   SpO2: 97%   Weight: 48 5 kg (107 lb)   Height: 5' 6" (1 676 m)       Physical Exam  Vitals reviewed  Constitutional:       General: He is not in acute distress  Appearance: He is well-developed  He is not ill-appearing or toxic-appearing  Comments: Thin appearing   HENT:      Head: Normocephalic and atraumatic  Mouth/Throat:      Mouth: Mucous membranes are moist    Eyes:      General: No scleral icterus  Extraocular Movements: Extraocular movements intact  Conjunctiva/sclera: Conjunctivae normal       Pupils: Pupils are equal, round, and reactive to light  Neck:      Thyroid: No thyromegaly  Cardiovascular:      Rate and Rhythm: Normal rate and regular rhythm  Heart sounds: Normal heart sounds  No murmur heard  Pulmonary:      Effort: Pulmonary effort is normal  No respiratory distress  Breath sounds: Normal breath sounds  No wheezing  Abdominal:      General: There is no distension  Palpations: Abdomen is soft  There is no mass  Tenderness: There is no abdominal tenderness  There is no guarding or rebound  Comments: No fluid wave appreciated   Musculoskeletal:      Cervical back: Neck supple  Left knee: Crepitus present  No swelling, effusion, erythema or ecchymosis  Normal range of motion  Tenderness present over the medial joint line  No LCL laxity, MCL laxity, ACL laxity or PCL laxity  Normal alignment, normal meniscus and normal patellar mobility  Instability Tests: Anterior drawer test negative  Posterior drawer test negative  Anterior Lachman test negative  Skin:     General: Skin is warm  Neurological:      General: No focal deficit present  Mental Status: He is alert and oriented to person, place, and time  Mental status is at baseline  Psychiatric:         Mood and Affect: Mood normal          Behavior: Behavior normal          Thought Content:  Thought content normal          Judgment: Judgment normal          Jesus Mask, [address]

## 2022-09-27 NOTE — PROGRESS NOTES
Daily Note     Today's date: 2022  Patient name: Sadie Arreola  : 1965  MRN: [id number]  Referring provider: Seven Bravo DO  Dx:   Encounter Diagnosis     ICD-10-CM    1  Ambulatory dysfunction  R26 2                   Subjective: The patient states that he is feeling okay today, offers no complaints at start of session  Was not sore after his last session but does noted that he was tired  "I felt like I had a good workout "          Objective: See treatment diary below      Assessment: Progressed patient as outlined below in daily treatment diary  Added standing hip flexion and abduction and hip add w/ball, able to complete without difficulty  Tolerated treatment fair  Fatigue is noted with TE and some rest breaks needed  Patient demonstrated fatigue post treatment and would benefit from continued PT  Plan: Continue per plan of care        Precautions: FALL RISK, Use cushion on chairs for coccyx, Hx of Seizures, 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      Manuals 9/15  9/21  9/28         Flexibility/PROM    HS/calves- CM  ML                                         Neuro Re-Ed               NBOS               SLS               Tandem Walking                Side Stepping         Hurdles: Fwd & Lat               Step-ups: Fwd               Step-ups: Lat               Sit to Stands                                 Ther Ex               NuStep nv   L4   L4 10'         SLR 1x5ea AAROM L 2x5   Active R 2x5 AAROM  L 2x5  AROM R  2x5         Clamshells    Supine L1 2x10      Supine  L2 2x10         Quad Sets    5"x10 ea 5" x 10 ea         Standing: HR Reviewed Seated for HEP Standing HR 15x Stand  HR 15x      Seated: TR Reviewed Seated for HEP Seated TR 10x  Seated  TR 10x      LAQ  10x ea 15x ea      SAQ  2x10 ea 2x10 ea      HC               Standing: Hip FLX    10x Jones         Standing: Hip ABD    10x Jones          Standing: Hip Ext              Seated FLX c/ SB         Hip Add w/Ball     5" x 15          Ther Activity               Stair Negotiation               Obstacle Course                                           Gait Training               Ambulation c/ SPC                               Modalities

## 2022-09-28 ENCOUNTER — OFFICE VISIT (OUTPATIENT)
Dept: PHYSICAL THERAPY | Facility: CLINIC | Age: 57
End: 2022-09-28
Payer: COMMERCIAL

## 2022-09-28 DIAGNOSIS — R26.2 AMBULATORY DYSFUNCTION: Primary | ICD-10-CM

## 2022-09-28 PROCEDURE — 97140 MANUAL THERAPY 1/> REGIONS: CPT | Performed by: PHYSICAL THERAPIST

## 2022-09-28 PROCEDURE — 97110 THERAPEUTIC EXERCISES: CPT | Performed by: PHYSICAL THERAPIST

## 2022-10-04 ENCOUNTER — HOSPITAL ENCOUNTER (OUTPATIENT)
Dept: BONE DENSITY | Facility: HOSPITAL | Age: 57
Discharge: HOME | End: 2022-10-04
Attending: STUDENT IN AN ORGANIZED HEALTH CARE EDUCATION/TRAINING PROGRAM
Payer: COMMERCIAL

## 2022-10-04 DIAGNOSIS — M80.00XS OSTEOPOROSIS WITH CURRENT PATHOLOGICAL FRACTURE, UNSPECIFIED OSTEOPOROSIS TYPE, SEQUELA: ICD-10-CM

## 2022-10-04 PROCEDURE — 77080 DXA BONE DENSITY AXIAL: CPT

## 2022-10-05 ENCOUNTER — OFFICE VISIT (OUTPATIENT)
Dept: PHYSICAL THERAPY | Facility: CLINIC | Age: 57
End: 2022-10-05
Payer: COMMERCIAL

## 2022-10-05 DIAGNOSIS — R26.2 AMBULATORY DYSFUNCTION: Primary | ICD-10-CM

## 2022-10-05 PROCEDURE — 97112 NEUROMUSCULAR REEDUCATION: CPT

## 2022-10-05 PROCEDURE — 97140 MANUAL THERAPY 1/> REGIONS: CPT

## 2022-10-05 PROCEDURE — 97110 THERAPEUTIC EXERCISES: CPT

## 2022-10-05 NOTE — PROGRESS NOTES
Daily Note     Today's date: 10/5/2022  Patient name: Jazzmine Schmid  : 1965  MRN: [id number]  Referring provider: Mini Sarmiento DO  Dx:   Encounter Diagnosis     ICD-10-CM    1  Ambulatory dysfunction  R26 2        Start Time: 0800  Stop Time:   Total time in clinic (min): 57 minutes    Subjective: Patient reports he feels like he is getting stronger  Legs are stiff today  Objective: See treatment diary below      Assessment: Tolerated treatment well  Patient demonstrated fatigue post treatment, exhibited good technique with therapeutic exercises and would benefit from continued PT  Demonstrates improved strength this visit as evidenced by ability to complete SAQs c/ increased resistance and progression to more functional step-up exercises  Required verbal cues to engages both sides during hook-lying clams  Plan: Continue per plan of care  Increase NuStep resistance to L5 and add resistance to LAQ        Precautions: FALL RISK, Use cushion on chairs for coccyx, Hx of Seizures, 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      Manuals 9/15  9/21  9/28  10       Flexibility/PROM    HS/calves- CM  ML  KS                                       Neuro Re-Ed               NBOS               SLS               Tandem Walking         nv       Side Stepping    3 laps      Hurdles: Fwd & Lat        nv       Step-ups: Fwd        8" x10ea side       Step-ups: Lat               Sit to Stands    nv                             Ther Ex               NuStep nv   L4   L4 10'  L4 10'       SLR 1x5ea AAROM L 2x5   Active R 2x5 AAROM  L 2x5  AROM R  2x5  AAROM  L 2x5  AROM R  2x5       Clamshells    Supine L1 2x10      Supine  L2 2x10  Supine L2 2x10       Quad Sets    5"x10 ea 5" x 10 ea  5"x10ea       Standing: HR Reviewed Seated for HEP Standing HR 15x Stand  HR 15x 20x     Seated: TR Reviewed Seated for HEP Seated TR 10x  Seated  TR 10x 20x     LAQ  10x ea 15x ea 20xea     SAQ  2x10 ea 2x10 ea 1# 2x10ea     HC        L1 2x10ea       Standing: Hip FLX    10x Jones  np       Standing: Hip ABD    10x Jones   np       Standing: Hip Ext              Seated FLX c/ SB         Hip Add w/Ball     5" x 15   5"x15       Ther Activity               Stair Negotiation               Obstacle Course                                           Gait Training               Ambulation c/ SPC                               Modalities

## 2022-10-07 ENCOUNTER — OFFICE VISIT (OUTPATIENT)
Dept: ENDOCRINOLOGY | Facility: CLINIC | Age: 57
End: 2022-10-07
Payer: COMMERCIAL

## 2022-10-07 VITALS
WEIGHT: 105 LBS | DIASTOLIC BLOOD PRESSURE: 70 MMHG | HEIGHT: 66 IN | BODY MASS INDEX: 16.88 KG/M2 | HEART RATE: 60 BPM | SYSTOLIC BLOOD PRESSURE: 104 MMHG

## 2022-10-07 DIAGNOSIS — K70.30 ALCOHOLIC CIRRHOSIS OF LIVER WITHOUT ASCITES (HCC): ICD-10-CM

## 2022-10-07 DIAGNOSIS — M80.00XS OSTEOPOROSIS WITH CURRENT PATHOLOGICAL FRACTURE, UNSPECIFIED OSTEOPOROSIS TYPE, SEQUELA: Primary | ICD-10-CM

## 2022-10-07 DIAGNOSIS — R79.89 LOW SERUM CORTISOL LEVEL: ICD-10-CM

## 2022-10-07 PROCEDURE — 99214 OFFICE O/P EST MOD 30 MIN: CPT | Performed by: STUDENT IN AN ORGANIZED HEALTH CARE EDUCATION/TRAINING PROGRAM

## 2022-10-07 RX ORDER — SODIUM CHLORIDE 9 MG/ML
20 INJECTION, SOLUTION INTRAVENOUS ONCE
Status: CANCELLED | OUTPATIENT
Start: 2022-10-13

## 2022-10-07 RX ORDER — ZOLEDRONIC ACID 5 MG/100ML
5 INJECTION, SOLUTION INTRAVENOUS ONCE
Status: CANCELLED | OUTPATIENT
Start: 2022-10-13

## 2022-10-07 NOTE — PROGRESS NOTES
Swetha Hernandez 65 y o  male MRN: [id number]    Encounter: [id number]      Assessment/Plan     1  Osteoporosis in male - Jean Claude Looney has history of numerous fractures  He has liver cirrhosis, which is contributory  Will arrange for yearly infusions of zoledronic acid, which has data demonstrating efficacy of fracture risk reduction in men  Margarito Drier of side effects including myalgias and viral like syndrome following therapy  His renal function is satisfactory for this therapy  Should be monitored clinically and with monitoring of labs and imaging  Anticipate a longer duration of therapy given his significant disease and numerous risk factors  However, will tentatively plan to evaluate for treatment efficacy following 2-3 years of therapy    2  Low serum cortisol - off steroid replacement with normal fasting cortisol and ACTH levels  3  Cirrhosis - following with GI    4  Elevated alk phos - check bone specific    Orders Placed This Encounter   Procedures    Comprehensive metabolic panel Lab Collect    Alkaline phosphatase, bone specific    Vitamin D 25 hydroxy Lab Collect    PTH, intact Lab Collect Lab Collect    Phosphorus Lab Collect    Magnesium Lab Collect       CC: Osteoporosis    History of Present Illness     HPI:    Jean Claude Looney presents today for follow up after DXA study  He has osteoporosis with history of numerous fractures  His history is further complicated by Traore's esophagus, alcoholic cirrhosis (sober), chronic hyponatremia, and seizure disorder  He is presently following with PT  He uses rolling walker for ambulation  He is edentulous and denies any mouth sores  He denies any significant kidney dysfunction  Jean Claude Looney is not on steroid replacement therapy  Review of Systems   Constitutional: Negative for appetite change and diaphoresis  HENT: Negative for trouble swallowing and voice change  Eyes: Negative for visual disturbance  Respiratory: Negative for shortness of breath  Cardiovascular: Negative for chest pain and palpitations  Gastrointestinal: Negative for nausea and vomiting  Endocrine: Negative for polydipsia and polyuria  Musculoskeletal: Positive for gait problem  Neurological: Negative for tremors  Psychiatric/Behavioral: Negative for agitation  All other systems reviewed and are negative  Historical Information   Past Medical History:   Diagnosis Date    Alcohol abuse     Traore esophagus     Bowel obstruction (HCC)     Bowel perforation (HCC)     Cardiac disease     Continuous chronic alcoholism ([facility] [address] ) 10/5/2017    COPD (chronic obstructive pulmonary disease) (HCC)     History of shoulder surgery     Right shoulder    History of transfusion     Hx of cervical spine surgery     Hypertension     Incisional hernia 10/8/2017    MI, old     Mitral regurgitation     Psychiatric disorder     Seizures ([facility] [address] )      Past Surgical History:   Procedure Laterality Date    APPENDECTOMY      BACK SURGERY      CHOLECYSTECTOMY      ESOPHAGOGASTRODUODENOSCOPY N/A 11/28/2016    Procedure: ESOPHAGOGASTRODUODENOSCOPY (EGD); Surgeon: Berta Farrar MD;  Location:  GI LAB;   Service:    [address]      LAPAROTOMY N/A 10/25/2016    Procedure: LAPAROTOMY EXPLORATORY;  Surgeon: Coreen Padilla MD;  Location: [facility];  Service:    [facility] MOUTH SURGERY      PERCUTANEOUS PINNING FEMORAL NECK FRACTURE      SHOULDER SURGERY Right     SHOULDER SURGERY      SMALL INTESTINE SURGERY      STOMACH SURGERY      bal surgery     Social History   Social History     Substance and Sexual Activity   Alcohol Use Yes    Alcohol/week: 42 0 standard drinks    Types: 42 Cans of beer per week    Comment: a six pack a day     Social History     Substance and Sexual Activity   Drug Use No     Social History     Tobacco Use   Smoking Status Current Every Day Smoker    Packs/day: 3 00    Years: 35 00    Pack years: 105 00    Types: Cigarettes   Smokeless Tobacco Never Used     Family History:   Family History   Problem Relation Age of Onset    Breast cancer Mother     Prostate cancer Father     Skin cancer Brother        Meds/Allergies   Current Outpatient Medications   Medication Sig Dispense Refill    albuterol (PROVENTIL HFA,VENTOLIN HFA) 90 mcg/act inhaler Inhale 2 puffs every 4 (four) hours as needed for wheezing or shortness of breath 18 g 2    bumetanide (BUMEX) 1 mg tablet Take 1 tablet (1 mg total) by mouth 2 (two) times a day 60 tablet 2    cyanocobalamin (VITAMIN B-12) 100 mcg tablet Take 1 tablet (100 mcg total) by mouth daily 30 tablet 5    Diclofenac Sodium (VOLTAREN) 1 % Apply 2 g topically 4 (four) times a day 100 g 1    ergocalciferol (VITAMIN D2) 50,000 units Take 1 capsule (50,000 Units total) by mouth once a week 13 capsule 1    folic acid (FOLVITE) 1 mg tablet Take 1 tablet (1 mg total) by mouth daily 30 tablet 0    levETIRAcetam (KEPPRA) 1000 MG tablet Take 1 tablet (1,000 mg total) by mouth every 12 (twelve) hours 60 tablet 2    lisinopril (ZESTRIL) 10 mg tablet Take 1 tablet (10 mg total) by mouth daily 30 tablet 5    LORazepam (ATIVAN) 0 5 mg tablet Take 1 tablet (0 5 mg total) by mouth daily at bedtime 30 tablet 0    Multiple Vitamin (TAB-A-BONI PO) Take 1 tablet by mouth daily      multivitamin (THERAGRAN) TABS Take 1 tablet by mouth daily      sodium chloride 1 g tablet Take 1 tablet (1 g total) by mouth 3 (three) times a day 90 tablet 1    albuterol (2 5 mg/3 mL) 0 083 % nebulizer solution Take 2 5 mg by nebulization every 6 (six) hours as needed for wheezing or shortness of breath (Patient not taking: Reported on 10/7/2022)      albuterol (ACCUNEB) 0 63 MG/3ML nebulizer solution  (Patient not taking: Reported on 10/7/2022)      Cholecalciferol 25 MCG (1000 UT) tablet Take 1 tablet (1,000 Units total) by mouth daily (Patient not taking: Reported on 10/7/2022) 30 tablet 0    dextromethorphan-guaifenesin (MUCINEX DM)  MG per 12 hr tablet Take 1 tablet by mouth every 12 (twelve) hours (Patient not taking: Reported on 10/7/2022) 14 tablet 0    diphenhydrAMINE (SOMINEX) 25 MG tablet Take 25 mg by mouth (Patient not taking: Reported on 10/7/2022)      docusate sodium (COLACE) 100 mg capsule Take 1 capsule (100 mg total) by mouth 2 (two) times a day as needed for constipation (Patient not taking: Reported on 10/7/2022) 30 capsule 0    esomeprazole (NexIUM) 40 MG capsule Take 1 capsule (40 mg total) by mouth 2 (two) times a day before meals (Patient not taking: Reported on 10/7/2022) 60 capsule 3    hydrocortisone (CORTEF) 10 mg tablet Take 5 tablets (50 mg total) by mouth 2 (two) times a day 60 tablet 0    hydrOXYzine HCL (ATARAX) 25 mg tablet Take 1 tablet (25 mg total) by mouth every 6 (six) hours as needed for itching, allergies or anxiety (Patient not taking: Reported on 10/7/2022) 90 tablet 0    lactulose (CHRONULAC) 10 g/15 mL solution  (Patient not taking: Reported on 10/7/2022)      loratadine (Claritin Reditabs) 10 MG dissolvable tablet Take 10 mg by mouth daily (Patient not taking: Reported on 10/7/2022)      pancrelipase, Lip-Prot-Amyl, (CREON) 6,000 units delayed release capsule Take 6,000 units of lipase by mouth 3 (three) times a day with meals (Patient not taking: Reported on 10/7/2022) 90 capsule 2    polyethylene glycol (GOLYTELY) 4000 mL solution Take 4,000 mL by mouth once for 1 dose (Patient not taking: No sig reported) 4000 mL 0    potassium chloride (K-DUR,KLOR-CON) 20 mEq tablet Take 1 tablet (20 mEq total) by mouth daily for 1 dose (Patient not taking: No sig reported) 2 tablet 0    spironolactone (ALDACTONE) 100 mg tablet Take 1 tablet (100 mg total) by mouth daily 90 tablet 3    thiamine 100 MG tablet Take 1 tablet (100 mg total) by mouth daily (Patient not taking: Reported on 10/7/2022) 30 tablet 0     No current facility-administered medications for this visit       No Known Allergies    Objective Vitals: Blood pressure 104/70, pulse 60, height 5' 6" (1 676 m), weight 47 6 kg (105 lb)  Physical Exam  Vitals reviewed  Constitutional:       Appearance: He is cachectic  He is ill-appearing  HENT:      Head: Normocephalic and atraumatic  Nose: Nose normal    Eyes:      Conjunctiva/sclera: Conjunctivae normal    Cardiovascular:      Rate and Rhythm: Normal rate and regular rhythm  Pulmonary:      Effort: Pulmonary effort is normal  No respiratory distress  Musculoskeletal:      Comments: Kyphotic   Neurological:      Mental Status: He is alert  Gait: Gait abnormal       Comments: Fluent and cogent speech   Psychiatric:         Mood and Affect: Mood normal          Behavior: Behavior normal          The history was obtained from the review of the chart, patient      Lab Results:   Lab Results   Component Value Date/Time    Potassium 3 9 09/15/2022 07:49 AM    Potassium 2 6 (LL) 05/26/2022 04:41 AM    Potassium 4 0 05/25/2022 04:38 AM    Chloride 87 (L) 09/15/2022 07:49 AM    Chloride 92 (L) 05/26/2022 04:41 AM    Chloride 92 (L) 05/25/2022 04:38 AM    CO2 28 09/15/2022 07:49 AM    CO2 30 05/26/2022 04:41 AM    CO2 28 05/25/2022 04:38 AM    BUN 25 09/15/2022 07:49 AM    BUN 15 05/26/2022 04:41 AM    BUN 15 05/25/2022 04:38 AM    Creatinine 1 17 09/15/2022 07:49 AM    Creatinine 0 71 05/26/2022 04:41 AM    Creatinine 0 75 05/25/2022 04:38 AM    Glucose, Fasting 107 (H) 09/15/2022 07:49 AM    Glucose, Fasting 85 04/28/2022 09:28 AM    Calcium 9 3 09/15/2022 07:49 AM    Calcium 7 4 (L) 05/26/2022 04:41 AM    Calcium 7 8 (L) 05/25/2022 04:38 AM    eGFR 69 09/15/2022 07:49 AM    eGFR 105 05/26/2022 04:41 AM    eGFR 103 05/25/2022 04:38 AM    TSH 3RD GENERATON 1 445 05/21/2022 06:22 AM    TSH 3RD GENERATON 2 436 05/20/2022 08:24 AM    TSH 3RD GENERATON 3 780 04/28/2022 09:28 AM    PTH 44 7 09/15/2022 07:49 AM    Vit D, 25-Hydroxy 36 3 09/15/2022 07:49 AM    Vit D, 25-Hydroxy 23 9 (L) 05/21/2022 06:22 AM    Vit D, 25-Hydroxy 11 0 (L) 04/28/2022 09:28 AM     Component      Latest Ref Rng & Units 9/15/2022   Sodium      135 - 147 mmol/L 122 (L)   Potassium      3 5 - 5 3 mmol/L 3 9   Chloride      96 - 108 mmol/L 87 (L)   CO2      21 - 32 mmol/L 28   Anion Gap      4 - 13 mmol/L 7   BUN      5 - 25 mg/dL 25   Creatinine      0 60 - 1 30 mg/dL 1 17   GLUCOSE FASTING      65 - 99 mg/dL 107 (H)   Calcium      8 3 - 10 1 mg/dL 9 3   CORRECTED CALCIUM      8 3 - 10 1 mg/dL 9 9   AST      5 - 45 U/L 24   ALT      12 - 78 U/L 18   Alkaline Phosphatase      46 - 116 U/L 188 (H)   Total Protein      6 4 - 8 4 g/dL 8 3   Albumin      3 5 - 5 0 g/dL 3 3 (L)   TOTAL BILIRUBIN      0 20 - 1 00 mg/dL 0 56   eGFR      ml/min/1 73sq m 69   Vit D, 25-Hydroxy      30 0 - 100 0 ng/mL 36 3   ACTH      7 2 - 63 3 pg/mL 18 3   Cortisol - AM      4 2 - 22 4 ug/dL 19 3   PARATHYROID HORMONE      18 4 - 80 1 pg/mL 44 7       Imaging Studies:         Results for orders placed during the hospital encounter of 10/04/22    DXA bone density spine hip and pelvis    Impression  1  Osteoporosis  3   The 10 year risk of hip fracture is 14% with the 10 year risk of major osteoporotic fracture being 22% as calculated by the [facility]/WHO fracture risk assessment tool (FRAX)  3   The current NOF guidelines recommend treating patients with a T-score of -2 5 or less in the lumbar spine or hips, or in post-menopausal women and men over the age of 48 with low bone mass (osteopenia) and a FRAX 10 year risk score of >3% for hip  fracture and/or >20% for major osteoporotic fracture  4   The NOF recommends follow-up DXA in 1-2 years after initiating therapy for osteoporosis and every 2 years thereafter  More frequent evaluation is appropriate for patients with conditions associated with rapid bone loss, such as glucocorticoid  therapy   The interval between DXA screenings may be longer for individuals without major risk factors and initial T-score in the normal or upper low bone mass range  The FRAX algorithm has certain limitations:  -FRAX has not been validated in patients currently or previously treated with pharmacotherapy for osteoporosis  In such patients, clinical judgment must be exercised in interpreting FRAX scores  -Prior hip, vertebral and humeral fragility fractures appear to confer greater risk of subsequent fracture than fractures at other sites (this is especially true for individuals with severe vertebral fractures), but quantification of this incremental  risk is not possible with FRAX  -FRAX underestimates fracture risk in patients with history of multiple fragility fractures  -FRAX may underestimate fracture risk in patients with history of frequent falls   -It is not appropriate to use FRAX to monitor treatment response  WHO CLASSIFICATION:  Normal (a T-score of -1 0 or higher)  Low bone mineral density (a T-score of less than -1 0 but higher than -2 5)  Osteoporosis (a T-score of -2 5 or less)  Severe osteoporosis (a T-score of -2 5 or less with a fragility fracture)            Workstation performed: [id number]            I have personally reviewed pertinent reports  Portions of the record may have been created with voice recognition software  Occasional wrong word or "sound a like" substitutions may have occurred due to the inherent limitations of voice recognition software  Read the chart carefully and recognize, using context, where substitutions have occurred

## 2022-10-10 DIAGNOSIS — G47.00 INSOMNIA, UNSPECIFIED TYPE: ICD-10-CM

## 2022-10-11 RX ORDER — LORAZEPAM 0.5 MG/1
0.5 TABLET ORAL
Qty: 30 TABLET | Refills: 0 | Status: SHIPPED | OUTPATIENT
Start: 2022-10-11

## 2022-10-12 ENCOUNTER — OFFICE VISIT (OUTPATIENT)
Dept: PHYSICAL THERAPY | Facility: CLINIC | Age: 57
End: 2022-10-12
Payer: COMMERCIAL

## 2022-10-12 DIAGNOSIS — R26.2 AMBULATORY DYSFUNCTION: Primary | ICD-10-CM

## 2022-10-12 PROCEDURE — 97112 NEUROMUSCULAR REEDUCATION: CPT

## 2022-10-12 PROCEDURE — 97140 MANUAL THERAPY 1/> REGIONS: CPT

## 2022-10-12 PROCEDURE — 97110 THERAPEUTIC EXERCISES: CPT

## 2022-10-12 NOTE — PROGRESS NOTES
Daily Note     Today's date: 10/12/2022  Patient name: Layla Friend  : 1965  MRN: [id number]  Referring provider: Jt Ferrara DO  Dx:   Encounter Diagnosis     ICD-10-CM    1  Ambulatory dysfunction  R26 2                   Subjective: Patient reports that his LBP is present, but he is able to sit for a longer period of time  Objective: See treatment diary below/ Increased resistance on Nustep, and incorporated more stability/balance into program today  Assessment: Tolerated treatment well  Patient demonstrated fatigue post treatment and would benefit from continued PT to improve B LE strength, balance, and stability for functionality  Patient has more L LE weakness, but was able to perform AROM SLR on L LE  Will progress as able and to tolerance  Patient is making consistent progress  Plan: Continue per plan of care        Precautions: FALL RISK, Use cushion on chairs for coccyx, Hx of Seizures, 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      Manuals 9/15  9/21  9/28  10/5 10/12     Flexibility/PROM    HS/calves- CM  ML  KS  CM                                     Neuro Re-Ed               NBOS               SLS               Tandem Walking         nv  3x//      Side Stepping    3 laps  3x//    Hurdles: Fwd & Lat        nv  low 4x// ea     Step-ups: Fwd        8" x10ea side  8" 10x ea     Step-ups: Lat               Sit to Stands    nv 2x5                            Ther Ex               NuStep nv   L4   L4 10'  L4 10'  L5 10'     SLR 1x5ea AAROM L 2x5   Active R 2x5 AAROM  L 2x5  AROM R  2x5  AAROM  L 2x5  AROM R  2x5  AROM Jones 2x5     Clamshells    Supine L1 2x10      Supine  L2 2x10  Supine L2 2x10  Supine L2 2x10     Quad Sets    5"x10 ea 5" x 10 ea  5"x10ea       Standing: HR Reviewed Seated for HEP Standing HR 15x Stand  HR 15x 20x 2x10    Seated: TR Reviewed Seated for HEP Seated TR 10x  Seated  TR 10x 20x 2x10    LAQ  10x ea 15x ea 20xea 2x10 ea     SAQ  2x10 ea 2x10 ea 1# 2x10ea 1# 2x10 ea HC        L1 2x10ea  L1 2x10 ea     Standing: Hip FLX    10x Jones  np       Standing: Hip ABD    10x Jones   np       Standing: Hip Ext              Seated FLX c/ SB         Hip Add w/Ball     5" x 15   5"x15       Ther Activity               Stair Negotiation               Obstacle Course                                           Gait Training               Ambulation c/ SPC                               Modalities

## 2022-10-13 ENCOUNTER — HOSPITAL ENCOUNTER (OUTPATIENT)
Dept: INFUSION CENTER | Facility: HOSPITAL | Age: 57
Discharge: HOME | End: 2022-10-13
Attending: STUDENT IN AN ORGANIZED HEALTH CARE EDUCATION/TRAINING PROGRAM
Payer: COMMERCIAL

## 2022-10-13 ENCOUNTER — APPOINTMENT (OUTPATIENT)
Dept: LAB | Facility: HOSPITAL | Age: 57
End: 2022-10-13
Attending: FAMILY MEDICINE
Payer: COMMERCIAL

## 2022-10-13 VITALS
DIASTOLIC BLOOD PRESSURE: 62 MMHG | TEMPERATURE: 97.7 F | RESPIRATION RATE: 16 BRPM | SYSTOLIC BLOOD PRESSURE: 96 MMHG | OXYGEN SATURATION: 97 % | HEART RATE: 96 BPM

## 2022-10-13 DIAGNOSIS — M80.00XS OSTEOPOROSIS WITH CURRENT PATHOLOGICAL FRACTURE, UNSPECIFIED OSTEOPOROSIS TYPE, SEQUELA: ICD-10-CM

## 2022-10-13 DIAGNOSIS — E87.1 CHRONIC HYPONATREMIA: ICD-10-CM

## 2022-10-13 DIAGNOSIS — D50.0 IRON DEFICIENCY ANEMIA DUE TO CHRONIC BLOOD LOSS: Primary | ICD-10-CM

## 2022-10-13 LAB
ALBUMIN SERPL BCP-MCNC: 3.5 G/DL (ref 3.5–5)
ALP SERPL-CCNC: 155 U/L (ref 46–116)
ALT SERPL W P-5'-P-CCNC: 22 U/L (ref 12–78)
ANION GAP SERPL CALCULATED.3IONS-SCNC: 8 MMOL/L (ref 4–13)
AST SERPL W P-5'-P-CCNC: 35 U/L (ref 5–45)
BILIRUB SERPL-MCNC: 1.14 MG/DL (ref 0.2–1)
BUN SERPL-MCNC: 22 MG/DL (ref 5–25)
CALCIUM SERPL-MCNC: 9.1 MG/DL (ref 8.3–10.1)
CHLORIDE SERPL-SCNC: 81 MMOL/L (ref 96–108)
CO2 SERPL-SCNC: 27 MMOL/L (ref 21–32)
CREAT SERPL-MCNC: 1.05 MG/DL (ref 0.6–1.3)
GFR SERPL CREATININE-BSD FRML MDRD: 78 ML/MIN/1.73SQ M
GLUCOSE P FAST SERPL-MCNC: 118 MG/DL (ref 65–99)
POTASSIUM SERPL-SCNC: 4.1 MMOL/L (ref 3.5–5.3)
PROT SERPL-MCNC: 8.5 G/DL (ref 6.4–8.4)
SODIUM SERPL-SCNC: 116 MMOL/L (ref 135–147)

## 2022-10-13 PROCEDURE — 96365 THER/PROPH/DIAG IV INF INIT: CPT

## 2022-10-13 PROCEDURE — 36415 COLL VENOUS BLD VENIPUNCTURE: CPT

## 2022-10-13 PROCEDURE — 80053 COMPREHEN METABOLIC PANEL: CPT

## 2022-10-13 RX ORDER — SODIUM CHLORIDE 9 MG/ML
20 INJECTION, SOLUTION INTRAVENOUS ONCE
Status: COMPLETED | OUTPATIENT
Start: 2022-10-13 | End: 2022-10-13

## 2022-10-13 RX ORDER — ZOLEDRONIC ACID 5 MG/100ML
5 INJECTION, SOLUTION INTRAVENOUS ONCE
Status: COMPLETED | OUTPATIENT
Start: 2022-10-13 | End: 2022-10-13

## 2022-10-13 RX ORDER — ZOLEDRONIC ACID 5 MG/100ML
5 INJECTION, SOLUTION INTRAVENOUS ONCE
OUTPATIENT
Start: 2023-10-13

## 2022-10-13 RX ORDER — SODIUM CHLORIDE 9 MG/ML
20 INJECTION, SOLUTION INTRAVENOUS ONCE
OUTPATIENT
Start: 2023-10-13

## 2022-10-13 RX ADMIN — ZOLEDRONIC ACID 5 MG: 5 INJECTION, SOLUTION INTRAVENOUS at 12:12

## 2022-10-13 RX ADMIN — SODIUM CHLORIDE 20 ML/HR: 0.9 INJECTION, SOLUTION INTRAVENOUS at 11:51

## 2022-10-13 NOTE — PLAN OF CARE
Problem: Potential for Falls  Goal: Patient will remain free of falls  Description: INTERVENTIONS:  - Educate patient/family on patient safety including physical limitations  - Instruct patient to call for assistance with activity   - Consult OT/PT to assist with strengthening/mobility   - Keep Call bell within reach  - Keep chair locked  - Keep care items and personal belongings within reach  -  Outcome: Progressing     Problem: INFECTION - ADULT  Goal: Absence or prevention of progression during hospitalization  Description: INTERVENTIONS:  - Assess and monitor for signs and symptoms of infection  - Monitor lab/diagnostic results  - Monitor all insertion sites, i e  indwelling lines, tubes, and drains  - Monitor endotracheal if appropriate and nasal secretions for changes in amount and color  - [place] appropriate cooling/warming therapies per order  - Administer medications as ordered  - Instruct and encourage patient and family to use good hand hygiene technique  - Identify and instruct in appropriate isolation precautions for identified infection/condition  Outcome: Progressing     Problem: Knowledge Deficit  Goal: Patient/family/caregiver demonstrates understanding of disease process, treatment plan, medications, and discharge instructions  Description: Complete learning assessment and assess knowledge base    Interventions:  - Provide teaching at level of understanding  - Provide teaching via preferred learning methods  Outcome: Progressing

## 2022-10-13 NOTE — PROGRESS NOTES
1133 Patient had labs on way in for infusion  Sodium 116, chloride 81  Blood pressure 83/58  He denies any lightheadedness, dizziness, headaches, tiredness or any other symptoms  He said he feels good  He is supposed to take sodium pills three times a day but not tolerating it  He says it makes him sick  It is ordered by Dr Christel Way and he does not see him until 10/24/2022  I sent message to Dr Miranda Stubbs via [employer]  to verify if ok to give reclast today and if he wanted me to reach out to Dr Jeanette Fernandez  1155 OK to give reclast today per Dr Miranda Stubbs and instructed patient should follow up with Dr Christel Way regarding low sodium  19 Janis Sanchez forwarded to Dr Christel Way via [employer] and it was read  No new orders  1225 Message sent to Shoshana Henley RN patient stated he spoke with Dr Miranda Stubbs regarding last two iron infusions and was told he does not need them  Shoshana Henley RN will D/C the iron plan  1252 Reclast infusion completed without incident  Patient is aware to increase fluids today and tomorrow regarding reclast infusion, and to increase sodium intake  I instructed him to call Dr Christel Way tomorrow if he does not hear from the office  Patient is aware to seek ER care if needed for any new symptoms  He was discharged in stable condition   Declined AVS

## 2022-10-14 DIAGNOSIS — D50.0 IRON DEFICIENCY ANEMIA DUE TO CHRONIC BLOOD LOSS: Primary | ICD-10-CM

## 2022-10-19 ENCOUNTER — OFFICE VISIT (OUTPATIENT)
Dept: PHYSICAL THERAPY | Facility: CLINIC | Age: 57
End: 2022-10-19
Payer: COMMERCIAL

## 2022-10-19 ENCOUNTER — APPOINTMENT (OUTPATIENT)
Dept: LAB | Facility: MEDICAL CENTER | Age: 57
End: 2022-10-19
Payer: COMMERCIAL

## 2022-10-19 DIAGNOSIS — R26.2 AMBULATORY DYSFUNCTION: Primary | ICD-10-CM

## 2022-10-19 DIAGNOSIS — M80.00XS OSTEOPOROSIS WITH CURRENT PATHOLOGICAL FRACTURE, UNSPECIFIED OSTEOPOROSIS TYPE, SEQUELA: ICD-10-CM

## 2022-10-19 LAB
25(OH)D3 SERPL-MCNC: 68.8 NG/ML (ref 30–100)
ALBUMIN SERPL BCP-MCNC: 3.4 G/DL (ref 3.5–5)
ALP SERPL-CCNC: 167 U/L (ref 46–116)
ALT SERPL W P-5'-P-CCNC: 16 U/L (ref 12–78)
ANION GAP SERPL CALCULATED.3IONS-SCNC: 10 MMOL/L (ref 4–13)
AST SERPL W P-5'-P-CCNC: 19 U/L (ref 5–45)
BILIRUB SERPL-MCNC: 0.53 MG/DL (ref 0.2–1)
BUN SERPL-MCNC: 23 MG/DL (ref 5–25)
CALCIUM ALBUM COR SERPL-MCNC: 9 MG/DL (ref 8.3–10.1)
CALCIUM SERPL-MCNC: 8.5 MG/DL (ref 8.3–10.1)
CHLORIDE SERPL-SCNC: 91 MMOL/L (ref 96–108)
CO2 SERPL-SCNC: 22 MMOL/L (ref 21–32)
CREAT SERPL-MCNC: 1.07 MG/DL (ref 0.6–1.3)
FERRITIN SERPL-MCNC: 327 NG/ML (ref 8–388)
GFR SERPL CREATININE-BSD FRML MDRD: 77 ML/MIN/1.73SQ M
GLUCOSE P FAST SERPL-MCNC: 62 MG/DL (ref 65–99)
IRON SATN MFR SERPL: 19 % (ref 20–50)
IRON SERPL-MCNC: 62 UG/DL (ref 65–175)
MAGNESIUM SERPL-MCNC: 1.4 MG/DL (ref 1.6–2.6)
PHOSPHATE SERPL-MCNC: 2.3 MG/DL (ref 2.7–4.5)
POTASSIUM SERPL-SCNC: 3.6 MMOL/L (ref 3.5–5.3)
PROT SERPL-MCNC: 8.5 G/DL (ref 6.4–8.4)
PTH-INTACT SERPL-MCNC: 184 PG/ML (ref 18.4–80.1)
SODIUM SERPL-SCNC: 123 MMOL/L (ref 135–147)
TIBC SERPL-MCNC: 318 UG/DL (ref 250–450)

## 2022-10-19 PROCEDURE — 97110 THERAPEUTIC EXERCISES: CPT

## 2022-10-19 PROCEDURE — 84080 ASSAY ALKALINE PHOSPHATASES: CPT

## 2022-10-19 PROCEDURE — 83550 IRON BINDING TEST: CPT

## 2022-10-19 PROCEDURE — 84100 ASSAY OF PHOSPHORUS: CPT

## 2022-10-19 PROCEDURE — 82306 VITAMIN D 25 HYDROXY: CPT

## 2022-10-19 PROCEDURE — 97140 MANUAL THERAPY 1/> REGIONS: CPT

## 2022-10-19 PROCEDURE — 82728 ASSAY OF FERRITIN: CPT

## 2022-10-19 PROCEDURE — 97112 NEUROMUSCULAR REEDUCATION: CPT

## 2022-10-19 PROCEDURE — 83970 ASSAY OF PARATHORMONE: CPT

## 2022-10-19 PROCEDURE — 36415 COLL VENOUS BLD VENIPUNCTURE: CPT

## 2022-10-19 PROCEDURE — 83735 ASSAY OF MAGNESIUM: CPT

## 2022-10-19 PROCEDURE — 83540 ASSAY OF IRON: CPT

## 2022-10-19 PROCEDURE — 80053 COMPREHEN METABOLIC PANEL: CPT

## 2022-10-19 NOTE — PROGRESS NOTES
Daily Note     Today's date: 10/19/2022  Patient name: Rachel Buckley  : 1965  MRN: [id number]  Referring provider: Mecca Ng DO  Dx:   Encounter Diagnosis     ICD-10-CM    1  Ambulatory dysfunction  R26 2                   Subjective: Patient reports that he is improving daily  Strength is improving  Objective: See treatment diary below  Increased reps on TE today  Assessment: Tolerated treatment well  Patient would benefit from continued PT to improve strength and balance for functionality and reduce the risk of falls  He is doing well and able to tolerate progression today without issues  He is walking with quad cane and was instructed on the correct hand and side placement  His gait has improved a lot since his initial evaluation  His strength is still limited, but is improving consistently  Plan: Continue per plan of care        Precautions: FALL RISK, Use cushion on chairs for coccyx, Hx of Seizures, 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      Manuals 9/15  9/21  9/28  10/5 10/12  10/19   Flexibility/PROM    HS/calves- CM  ML  KS  CM  CM                                   Neuro Re-Ed               NBOS               SLS               Tandem Walking         nv  3x//   3x//   Side Stepping    3 laps  3x// 3x//   Hurdles: Fwd & Lat        nv  low 4x// ea  Low 4x// ea   Step-ups: Fwd        8" x10ea side  8" 10x ea  8" 10x ea   Step-ups: Lat               Sit to Stands    nv 2x5 2x5                           Ther Ex               NuStep nv   L4   L4 10'  L4 10'  L5 10'  L5 10'   SLR 1x5ea AAROM L 2x5   Active R 2x5 AAROM  L 2x5  AROM R  2x5  AAROM  L 2x5  AROM R  2x5  AROM Jones 2x5  AROM Jones 2x10   Clamshells    Supine L1 2x10      Supine  L2 2x10  Supine L2 2x10  Supine L2 2x10  Supine L2 3x10   Quad Sets    5"x10 ea 5" x 10 ea  5"x10ea       Standing: HR Reviewed Seated for HEP Standing HR 15x Stand  HR 15x 20x 2x10 2x10    Seated: TR Reviewed Seated for HEP Seated TR 10x  Seated  TR 10x 20x 2x10 2x10   LAQ  10x ea 15x ea 20xea 2x10 ea  1# 2x10 ea   SAQ  2x10 ea 2x10 ea 1# 2x10ea 1# 2x10 ea 1# 3x10 ea   HC        L1 2x10ea  L1 2x10 ea  L1 2x10 ea   Standing: Hip FLX    10x Jones  np      Standing: Hip ABD    10x Jones   np    2x10 ea   Standing: Hip Ext              Seated FLX c/ SB         Hip Add w/Ball     5" x 15   5"x15    5"x20    Ther Activity               Stair Negotiation               Obstacle Course                                           Gait Training               Ambulation c/ SPC                               Modalities

## 2022-10-24 ENCOUNTER — OFFICE VISIT (OUTPATIENT)
Dept: FAMILY MEDICINE CLINIC | Facility: CLINIC | Age: 57
End: 2022-10-24
Payer: COMMERCIAL

## 2022-10-24 VITALS
SYSTOLIC BLOOD PRESSURE: 90 MMHG | WEIGHT: 103.2 LBS | RESPIRATION RATE: 20 BRPM | OXYGEN SATURATION: 100 % | HEIGHT: 63 IN | HEART RATE: 104 BPM | DIASTOLIC BLOOD PRESSURE: 60 MMHG | BODY MASS INDEX: 18.29 KG/M2 | TEMPERATURE: 96.3 F

## 2022-10-24 DIAGNOSIS — R60.9 FLUID RETENTION: ICD-10-CM

## 2022-10-24 DIAGNOSIS — E83.42 HYPOMAGNESEMIA: ICD-10-CM

## 2022-10-24 DIAGNOSIS — E87.1 CHRONIC HYPONATREMIA: Primary | ICD-10-CM

## 2022-10-24 LAB — ALP BONE SERPL-MCNC: 31.3 UG/L (ref 7.5–25.7)

## 2022-10-24 PROCEDURE — T1015 CLINIC SERVICE: HCPCS | Performed by: FAMILY MEDICINE

## 2022-10-24 RX ORDER — BUMETANIDE 1 MG/1
1 TABLET ORAL DAILY
Qty: 60 TABLET | Refills: 2
Start: 2022-10-24

## 2022-10-24 NOTE — PROGRESS NOTES
Assessment/Plan     Diagnoses and all orders for this visit:    Chronic hyponatremia  -     Sodium, urine, random; Future  -     Osmolality-"If this is regarding a toxic alcohol, STOP  Test is not routinely indicated  Please consult medical  on call for further guidance "; Future  -     Osmolality, urine; Future  -     Comprehensive metabolic panel; Future  -     Ambulatory Referral to Nephrology; Future    Hypomagnesemia  -     Magnesium; Future  -     Ambulatory Referral to Nephrology; Future    Fluid retention  -     bumetanide (BUMEX) 1 mg tablet; Take 1 tablet (1 mg total) by mouth daily      Reviewed labs today with patient  Clinically, he is doing well and has no current symptoms of hyponatremia  He appears to be clinically euvolemic  Patient is very reluctant to change his current medication regimen as he is feeling well on them  I recommended that he discontinue his Bumex at this time as I think it may be contributing to his hyponatremia, however, patient is reluctant to do this  We agreed on reducing the current dose by 50% to Bumex 1 mg once daily  I again encouraged him to reduce his p o  Fluid intake  Will refer to Nephrology at this time for further recommendations and assistance on electrolyte derangements  I recommended he start taking a magnesium supplement however patient refused this  Plan for now is to recheck his labs including urine electrolytes in 2 weeks  Patient in agreement with above plan  He is to follow-up in the office here in 3 weeks    Subjective     Patient Id: Christina Hilton is a 65 y o  male    HPI    Patient presents today to review his current medications  Patient has significant history of alcohol liver cirrhosis, iron deficiency anemia, osteoporosis, hypertension, seizure disorder, cervical disc disease, vertebral fractures, chronic pancreatitis and hyponatremia  Last visit had lab findings with significant hyponatremia  Sodium level was 116   Clinically patient was euvolemic  Patient was advised to stop his Bumex and restrict his fluid intake  Repeat labs revealed sodium improved to 123  Patient remained overall asymptomatic  Today, he reports that he never discontinued his Bumex and has continued to take 1 mg twice daily  He also has not reduced his fluid intake  Currently he drinks approximately six 12 oz nonalcoholic beers, one 16 oz Gatorade and one 12 oz water on average per day  He reports he urinates frequently but this has been consistent with him for quite some time  He states today that he feels “excellent” and is very reluctant to change any of his current medications because he feels as though they are working very well for him  Of note, patient also found to be hypo magnesemic on most recent labs  He is not currently on a magnesium supplement  Otherwise, he has no complaints or concerns today  Review of Systems   Constitutional: Negative for chills and fever  Respiratory: Negative for cough, shortness of breath and wheezing  Cardiovascular: Negative for chest pain  Gastrointestinal: Negative for abdominal pain, constipation, diarrhea, nausea and vomiting  Genitourinary: Negative for dysuria  Musculoskeletal: Positive for arthralgias and gait problem  Negative for myalgias  Skin: Negative for rash  Neurological: Negative for dizziness, seizures, syncope, facial asymmetry, speech difficulty, weakness, light-headedness and headaches  Psychiatric/Behavioral: Negative for dysphoric mood  All other systems reviewed and are negative        Past Medical History:   Diagnosis Date   • Alcohol abuse    • Traore esophagus    • Bowel obstruction (HCC)    • Bowel perforation (HCC)    • Cardiac disease    • Continuous chronic alcoholism ([facility] [address] ) 10/5/2017   • COPD (chronic obstructive pulmonary disease) (HCC)    • History of shoulder surgery     Right shoulder   • History of transfusion    • Hx of cervical spine surgery    • Hypertension • Incisional hernia 10/8/2017   • MI, old    • Mitral regurgitation    • Psychiatric disorder    • Seizures [facility])        Past Surgical History:   Procedure Laterality Date   • APPENDECTOMY     • BACK SURGERY     • CHOLECYSTECTOMY     • ESOPHAGOGASTRODUODENOSCOPY N/A 11/28/2016    Procedure: ESOPHAGOGASTRODUODENOSCOPY (EGD); Surgeon: Amisha Machado MD;  Location:  GI LAB;   Service:    • GALLBLADDER SURGERY     • LAPAROTOMY N/A 10/25/2016    Procedure: LAPAROTOMY EXPLORATORY;  Surgeon: Isaías Hernandez MD;  Location: [facility];  Service:    • MOUTH SURGERY     • PERCUTANEOUS PINNING FEMORAL NECK FRACTURE     • SHOULDER SURGERY Right    • SHOULDER SURGERY     • SMALL INTESTINE SURGERY     • STOMACH SURGERY      bal surgery       Family History   Problem Relation Age of Onset   • Breast cancer Mother    • Prostate cancer Father    • Skin cancer Brother        No Known Allergies      Current Outpatient Medications:   •  albuterol (2 5 mg/3 mL) 0 083 % nebulizer solution, Take 2 5 mg by nebulization every 6 (six) hours as needed for wheezing or shortness of breath, Disp: , Rfl:   •  albuterol (ACCUNEB) 0 63 MG/3ML nebulizer solution, , Disp: , Rfl:   •  albuterol (PROVENTIL HFA,VENTOLIN HFA) 90 mcg/act inhaler, Inhale 2 puffs every 4 (four) hours as needed for wheezing or shortness of breath, Disp: 18 g, Rfl: 2  •  bumetanide (BUMEX) 1 mg tablet, Take 1 tablet (1 mg total) by mouth daily, Disp: 60 tablet, Rfl: 2  •  Cholecalciferol 25 MCG (1000 UT) tablet, Take 1 tablet (1,000 Units total) by mouth daily, Disp: 30 tablet, Rfl: 0  •  cyanocobalamin (VITAMIN B-12) 100 mcg tablet, Take 1 tablet (100 mcg total) by mouth daily, Disp: 30 tablet, Rfl: 5  •  dextromethorphan-guaifenesin (MUCINEX DM)  MG per 12 hr tablet, Take 1 tablet by mouth every 12 (twelve) hours, Disp: 14 tablet, Rfl: 0  •  Diclofenac Sodium (VOLTAREN) 1 %, Apply 2 g topically 4 (four) times a day, Disp: 100 g, Rfl: 1  •  diphenhydrAMINE (SOMINEX) 25 MG tablet, Take 25 mg by mouth, Disp: , Rfl:   •  docusate sodium (COLACE) 100 mg capsule, Take 1 capsule (100 mg total) by mouth 2 (two) times a day as needed for constipation, Disp: 30 capsule, Rfl: 0  •  ergocalciferol (VITAMIN D2) 50,000 units, Take 1 capsule (50,000 Units total) by mouth once a week, Disp: 13 capsule, Rfl: 1  •  esomeprazole (NexIUM) 40 MG capsule, Take 1 capsule (40 mg total) by mouth 2 (two) times a day before meals, Disp: 60 capsule, Rfl: 3  •  folic acid (FOLVITE) 1 mg tablet, Take 1 tablet (1 mg total) by mouth daily, Disp: 30 tablet, Rfl: 0  •  hydrocortisone (CORTEF) 10 mg tablet, Take 5 tablets (50 mg total) by mouth 2 (two) times a day, Disp: 60 tablet, Rfl: 0  •  hydrOXYzine HCL (ATARAX) 25 mg tablet, Take 1 tablet (25 mg total) by mouth every 6 (six) hours as needed for itching, allergies or anxiety, Disp: 90 tablet, Rfl: 0  •  lactulose (CHRONULAC) 10 g/15 mL solution, , Disp: , Rfl:   •  levETIRAcetam (KEPPRA) 1000 MG tablet, Take 1 tablet (1,000 mg total) by mouth every 12 (twelve) hours, Disp: 60 tablet, Rfl: 2  •  lisinopril (ZESTRIL) 10 mg tablet, Take 1 tablet (10 mg total) by mouth daily, Disp: 30 tablet, Rfl: 5  •  loratadine (Claritin Reditabs) 10 MG dissolvable tablet, Take 10 mg by mouth daily, Disp: , Rfl:   •  LORazepam (ATIVAN) 0 5 mg tablet, Take 1 tablet (0 5 mg total) by mouth daily at bedtime, Disp: 30 tablet, Rfl: 0  •  Multiple Vitamin (TAB-A-BONI PO), Take 1 tablet by mouth daily, Disp: , Rfl:   •  multivitamin (THERAGRAN) TABS, Take 1 tablet by mouth daily, Disp: , Rfl:   •  pancrelipase, Lip-Prot-Amyl, (CREON) 6,000 units delayed release capsule, Take 6,000 units of lipase by mouth 3 (three) times a day with meals, Disp: 90 capsule, Rfl: 2  •  spironolactone (ALDACTONE) 100 mg tablet, Take 1 tablet (100 mg total) by mouth daily, Disp: 90 tablet, Rfl: 3  •  thiamine 100 MG tablet, Take 1 tablet (100 mg total) by mouth daily, Disp: 30 tablet, Rfl: 0  •  polyethylene glycol (GOLYTELY) 4000 mL solution, Take 4,000 mL by mouth once for 1 dose (Patient not taking: No sig reported), Disp: 4000 mL, Rfl: 0  •  potassium chloride (K-DUR,KLOR-CON) 20 mEq tablet, Take 1 tablet (20 mEq total) by mouth daily for 1 dose (Patient not taking: No sig reported), Disp: 2 tablet, Rfl: 0    Objective     Vitals:    10/24/22 1118   BP: 90/60   Pulse: 104   Resp: 20   Temp: (!) 96 3 °F (35 7 °C)   SpO2: 100%   Weight: 46 8 kg (103 lb 3 2 oz)   Height: 5' 3" (1 6 m)       Physical Exam  Vitals reviewed  Constitutional:       General: He is not in acute distress  Appearance: He is well-developed  He is not ill-appearing or toxic-appearing  Comments: Thin appearing   HENT:      Head: Normocephalic and atraumatic  Mouth/Throat:      Mouth: Mucous membranes are moist    Eyes:      General: No scleral icterus  Extraocular Movements: Extraocular movements intact  Conjunctiva/sclera: Conjunctivae normal       Pupils: Pupils are equal, round, and reactive to light  Neck:      Thyroid: No thyromegaly  Cardiovascular:      Rate and Rhythm: Normal rate and regular rhythm  Heart sounds: Normal heart sounds  No murmur heard  Pulmonary:      Effort: Pulmonary effort is normal  No respiratory distress  Breath sounds: Normal breath sounds  No wheezing  Abdominal:      General: There is no distension  Palpations: Abdomen is soft  There is no mass  Tenderness: There is no abdominal tenderness  There is no guarding or rebound  Comments: No fluid wave appreciated   Musculoskeletal:      Cervical back: Neck supple  Skin:     General: Skin is warm  Neurological:      General: No focal deficit present  Mental Status: He is alert and oriented to person, place, and time  Mental status is at baseline  Motor: No weakness  Gait: Gait abnormal (Ambulating with cane)     Psychiatric:         Mood and Affect: Mood normal  Behavior: Behavior normal          Thought Content:  Thought content normal          Judgment: Judgment normal          [address]

## 2022-10-26 ENCOUNTER — APPOINTMENT (OUTPATIENT)
Dept: PHYSICAL THERAPY | Facility: CLINIC | Age: 57
End: 2022-10-26

## 2022-10-28 NOTE — PROGRESS NOTES
Daily Note     Today's date: 10/31/2022  Patient name: Shiva Tellez  : 1965  MRN: [id number]  Referring provider: Rosa Lujan DO  Dx:   Encounter Diagnosis     ICD-10-CM    1  Ambulatory dysfunction  R26 2                   Subjective: The patient states that he is stiff today because he did a lot of painting over the weekend  Notes that he started to walk with his QC now because it helps him to walk more upright than his walker  Objective: See treatment diary below      Assessment: Tolerated treatment well  He did note increased fatigue in LLE with completion of TE  Patient demonstrated fatigue post treatment and would benefit from continued PT to improve function and mobility  Plan: Continue per plan of care        Precautions: FALL RISK, Use cushion on chairs for coccyx, Hx of Seizures, 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      Manuals 10/31  9/21  9/28  10/5 10/12  10/19   Flexibility/PROM  ML  HS/calves- CM  ML  KS  CM  CM                                   Neuro Re-Ed               NBOS               SLS               Tandem Walking  5x //      nv  3x//   3x//   Side Stepping 3x //   3 laps  3x// 3x//   Hurdles: Fwd & Lat Low 4x ea //      nv  low 4x// ea  Low 4x// ea   Step-ups: Fwd 8" 10x ea      8" x10ea side  8" 10x ea  8" 10x ea   Step-ups: Lat               Sit to Stands 2 x 5   nv 2x5 2x5                           Ther Ex               NuStep L5 10'  L4   L4 10'  L4 10'  L5 10'  L5 10'   SLR AROM Jones 2x10  2# RLE  1# LLE AAROM L 2x5   Active R 2x5 AAROM  L 2x5  AROM R  2x5  AAROM  L 2x5  AROM R  2x5  AROM Jones 2x5  AROM Jones 2x10   Clamshells Supine L2 3x10  Supine L1 2x10      Supine  L2 2x10  Supine L2 2x10  Supine L2 2x10  Supine L2 3x10   Quad Sets    5"x10 ea 5" x 10 ea  5"x10ea       Standing: HR 2x10 Standing HR 15x Stand  HR 15x 20x 2x10 2x10    Seated: TR 2x10 Seated TR 10x  Seated  TR 10x 20x 2x10 2x10   LAQ 2# 2x10 ea 10x ea 15x ea 20xea 2x10 ea  1# 2x10 ea   SAQ 2# 3x10 ea 2x10 ea 2x10 ea 1# 2x10ea 1# 2x10 ea 1# 3x10 ea   HC L1 2x10 ea      L1 2x10ea  L1 2x10 ea  L1 2x10 ea   Standing: Hip FLX    10x Jones  np      Standing: Hip ABD 2x10 ea  1#   10x Jones   np    2x10 ea   Standing: Hip Ext              Seated FLX c/ SB         Hip Add w/Ball 5" x 20   5" x 15   5"x15    5"x20    Ther Activity               Stair Negotiation               Obstacle Course                                           Gait Training               Ambulation c/ SPC                               Modalities

## 2022-10-31 ENCOUNTER — OFFICE VISIT (OUTPATIENT)
Dept: PHYSICAL THERAPY | Facility: CLINIC | Age: 57
End: 2022-10-31

## 2022-10-31 DIAGNOSIS — R26.2 AMBULATORY DYSFUNCTION: Primary | ICD-10-CM

## 2022-11-03 ENCOUNTER — OFFICE VISIT (OUTPATIENT)
Dept: PHYSICAL THERAPY | Facility: CLINIC | Age: 57
End: 2022-11-03

## 2022-11-03 DIAGNOSIS — R26.2 AMBULATORY DYSFUNCTION: Primary | ICD-10-CM

## 2022-11-03 NOTE — PROGRESS NOTES
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  Stiffness is present  Objective: See treatment diary below  Progressed reps on step ups, incorporated resistance with HR/TR and side stepping, increased resistance on L LE SLR, and hip abd  Assessment: Tolerated treatment well  Patient demonstrated fatigue post treatment and would benefit from continued PT to improve B LE strength ans stability for functionality  Patient's strength is improving and he had no issues with progression  Plan: Continue per plan of care        Precautions: FALL RISK, Use cushion on chairs for coccyx, Hx of Seizures, 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      Manuals 10/31 11/3  9/28  10/5 10/12  10/19   HS/calves   ML CM   ML  KS  CM  CM                                 Neuro Re-Ed              NBOS              SLS              Tandem Walking  5x // 5x//    nv  3x//   3x//   Side Stepping 3x // L1 3x//  3 laps  3x// 3x//   Hurdles: Fwd & Lat Low 4x ea // Low 5x// ea    nv  low 4x// ea  Low 4x// ea   Step-ups: Fwd 8" 10x ea 8" 15x ea    8" x10ea side  8" 10x ea  8" 10x ea   Step-ups: Lat              Sit to Stands 2 x 5 2x5  nv 2x5 2x5   Side Stepping                       Ther Ex              NuStep L5 10' L5 10'  L4 10'  L4 10'  L5 10'  L5 10'   SLR AROM Jones 2x10  2# 2x10 ea Jones 2x10  2# 2x10 ea AAROM  L 2x5  AROM R  2x5  AAROM  L 2x5  AROM R  2x5  AROM Jones 2x5  AROM Jones 2x10   Clamshells Supine L2 3x10 L3 2x10 Supine  L2 2x10  Supine L2 2x10  Supine L2 2x10  Supine L2 3x10   Quad Sets    5" x 10 ea  5"x10ea       Standing: HR 2x10 L1# R2#   2x10 ea Stand  HR 15x 20x 2x10 2x10    Seated: TR 2x10 L1# R2#   2x10 ea Seated  TR 10x 20x 2x10 2x10   LAQ 2# 2x10 ea 2# 2x10 ea 15x ea 20xea 2x10 ea  1# 2x10 ea   SAQ 2# 3x10 ea 2# 3x10 ea 2x10 ea 1# 2x10ea 1# 2x10 ea 1# 3x10 ea   HC L1 2x10 ea L2 2x10 ea    L1 2x10ea  L1 2x10 ea  L1 2x10 ea   Standing: Hip FLX   10x Jones  np      Standing: Hip ABD 2x10 ea  1# L1# R2#   2x10 ea 10x Jones   np    2x10 ea   Standing: Hip Ext             Seated FLX c/ SB  10x ea way       Hip Add w/Ball 5" x 20 5"x20 5" x 15   5"x15    5"x20    Ther Activity              Stair Negotiation              Obstacle Course                                          Gait Training              Ambulation c/ SPC                             Modalities               MHP   MHP w/ supine TE

## 2022-11-07 ENCOUNTER — OFFICE VISIT (OUTPATIENT)
Dept: PHYSICAL THERAPY | Facility: CLINIC | Age: 57
End: 2022-11-07

## 2022-11-07 DIAGNOSIS — R26.2 AMBULATORY DYSFUNCTION: Primary | ICD-10-CM

## 2022-11-07 NOTE — PROGRESS NOTES
PT Re-Evaluation     Today's date: 2022  Patient name: Rubbie Prader  : 1965  MRN: [id number]  Referring provider: Debra Ramos DO  Dx:   Encounter Diagnosis     ICD-10-CM    1  Ambulatory dysfunction  R26 2        Start Time: 0800  Stop Time: 0900  Total time in clinic (min): 60 minutes    Assessment  Assessment details: Patient is a 66 yo male with medical diagnosis of gait dysfunction  Patient continues to demonstrate progress in pain and function as evidenced by improved gait mechanics, increased function during functional mobility, and increased LE strength  He has made significant progress c/ functional testing (five time sit to stand and TUG test), far surpassing MCID, but continues to remain slightly above the cut off for increased fall risk  Also, continues to demonstrate gait impairment, decreased LE strength (L>R) and impaired balance placing patient at greater risk of falls  Patient's LTGs are progressing, but not fully met at this time  Patient will continue to benefit from skilled PT interventions to address remaining impairments and functional limitations and decrease risk of falls  Impairments: abnormal gait, abnormal or restricted ROM, abnormal movement, activity intolerance, impaired balance, impaired physical strength, pain with function and safety issue    Goals  STG (3 weeks): Improve Five Time Sit to Stand by  2 3"  Met  Increase LE strength by 1/2 grade  Met  LTG (6 weeks):  TUG <13 5"  Met  Five Time Sit to Stand <13 6"  Progressing, not Met  LE Strength >=4+/5  Progressing, not met  Patient will be independent with HEP in 6 weeks to allow independent management of condition  Progressing, not met  Plan  Plan details: TE, NMR, TA, MT, self-care, and modalities as needed in order to progress through skilled PT focused on ROM, strength, balance, motor control     Patient would benefit from: skilled physical therapy  Planned modality interventions: cryotherapy and thermotherapy: hydrocollator packs  Planned therapy interventions: manual therapy, neuromuscular re-education, patient education, self care, therapeutic activities, therapeutic exercise, home exercise program and gait training  Frequency: 2x week  Duration in weeks: 6  Treatment plan discussed with: patient        Subjective Evaluation    History of Present Illness  Mechanism of injury: Patient is a 66 yo male presenting with balance and gait dysfunction  Patient [facility] prior to being hospitalized for a fall in 2022  Was hospitalized for several weeks and admitted to a nursing home for PT  Followed up with his family doctor after SNF and prescribed PT  Most recent fall was about 3 weeks ago, reports he "only bumped my eye " Uses a RW for all ambulation at home and in the community  Rarely, "takes very short trips without it (his RW), but theres always something to grab onto if I do this " PMHx is (+) for seizure disorder and history of back fractures  Patients primary goals for PT are to increase strength, improve balance, improve gait and decrease risk of falls  UPDATE : Patient presents to PT using NBQC for ambulation, but reports he doesn't use an AD most of the time at home  Has noticed significant progress in his strength and function since beginning PT     Pain  Current pain ratin  At worst pain rating: 10  Location: low back pain    Social Support  Steps to enter house: yes (7 TIFF c/ u/l right ascending railing)  Stairs in house: no   Lives in: [facility] (walk-in shower and shower chair)    Treatments  Previous treatment: physical therapy  Patient Goals  Patient goals for therapy: improved balance, increased strength, return to sport/leisure activities and independence with ADLs/IADLs          Objective   Gait: NBQC v  No AD, decreased ecc control of tib ant, unsteady but able to self correct    Transfers:     -Sit to Stand: Ind no use of UE     -Sit <> Supine: Ind    TU" s/ AD and no UE use to stand  Five Time Sit to Stand: 16" no UE use     MMT (R/L):  Hip FLX: 4/4-  Hip ABD: 4-/3+  Knee EXT: 4-/4-  Knee FLX: 4-/3+  Ankle DF: 4- /4-           Precautions: FALL RISK, Use cushion on chairs for coccyx, Hx of Seizures, 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    Manuals 10/31 11/3  11/7  10/5 10/12  10/19   HS/calves   ML CM  KS  KS  CM  CM                                 Neuro Re-Ed              NBOS              SLS              Tandem Walking  5x // 5x//  5x//  nv  3x//   3x//   Side Stepping 3x // L1 3x// 2# 3x// 3 laps  3x// 3x//   Hurdles: Fwd & Lat Low 4x ea // Low 5x// ea  High 10x// 2#/0#  nv  low 4x// ea  Low 4x// ea   Step-ups: Fwd 8" 10x ea 8" 15x ea  8" 15x ea  8" x10ea side  8" 10x ea  8" 10x ea   Step-ups: Lat              Sit to Stands 2 x 5 2x5 2x5 nv 2x5 2x5   Side Stepping                       Ther Ex              NuStep L5 10' L5 10'  L5 10'  L4 10'  L5 10'  L5 10'   SLR AROM Jones 2x10  2# 2x10 ea Jones 2x10  2# 2x10 ea 2# 2x10ea  AAROM  L 2x5  AROM R  2x5  AROM Jones 2x5  AROM Jones 2x10   Clamshells Supine L2 3x10 L3 2x10 L3 2x10ea  Supine L2 2x10  Supine L2 2x10  Supine L2 3x10   Quad Sets      5"x10ea       Standing: HR 2x10 L1# R2#   2x10 ea 2# 2x10ea 20x 2x10 2x10    Seated: TR 2x10 L1# R2#   2x10 ea 2# 2x10ea 20x 2x10 2x10   LAQ 2# 2x10 ea 2# 2x10 ea 2# 2x10ea 20xea 2x10 ea  1# 2x10 ea   SAQ 2# 3x10 ea 2# 3x10 ea 2# 3x10ea 1# 2x10ea 1# 2x10 ea 1# 3x10 ea   HC L1 2x10 ea L2 2x10 ea  L2 2x10ea  L1 2x10ea  L1 2x10 ea  L1 2x10 ea   Standing: Hip FLX     np      Standing: Hip ABD 2x10 ea  1# L1# R2#   2x10 ea 2# 2x10ea  np    2x10 ea   Standing: Hip Ext             Seated FLX c/ SB  10x ea way 10x ea way      Hip Add w/Ball 5" x 20 5"x20 5" x 15   5"x15    5"x20    Ther Activity              Stair Negotiation              Obstacle Course                                          Gait Training              Ambulation c/ SPC                             Modalities               MHP   MHP w/ supine TE  MHP c/ supine TE

## 2022-11-10 ENCOUNTER — OFFICE VISIT (OUTPATIENT)
Dept: PHYSICAL THERAPY | Facility: CLINIC | Age: 57
End: 2022-11-10

## 2022-11-10 ENCOUNTER — TELEPHONE (OUTPATIENT)
Dept: FAMILY MEDICINE CLINIC | Facility: CLINIC | Age: 57
End: 2022-11-10

## 2022-11-10 DIAGNOSIS — R26.2 AMBULATORY DYSFUNCTION: Primary | ICD-10-CM

## 2022-11-10 DIAGNOSIS — G47.00 INSOMNIA, UNSPECIFIED TYPE: ICD-10-CM

## 2022-11-10 NOTE — PROGRESS NOTES
Daily Note     Today's date: 11/10/2022  Patient name: Alfredito Bowman  : 1965  MRN: [id number]  Referring provider: Edgar Justin DO  Dx:   Encounter Diagnosis     ICD-10-CM    1  Ambulatory dysfunction  R26 2                   Subjective: Patient reports some aches and pains due to cold weather  Objective: See treatment diary below  Progressed resistance and reps on some TE today  Assessment: Tolerated treatment well  Patient exhibited good technique with therapeutic exercises and would benefit from continued PT to improve B LE strength for functionality and endurance/ Patient is making consistent improvement and doing well with TE  Plan: Continue per plan of care        Precautions: FALL RISK, Use cushion on chairs for coccyx, Hx of Seizures, 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    Manuals 10/31 11/3  11/7 11/10     HS/calves   ML CM  KS CM                             Neuro Re-Ed           NBOS           SLS           Tandem Walking  5x // 5x//  5x// 5x//     Side Stepping 3x // L1 3x// 2# 3x// L2 20x     Hurdles: Fwd & Lat Low 4x ea // Low 5x// ea  High 10x// 2#/0# High 2# 10x ea     Step-ups: Fwd 8" 10x ea 8" 15x ea  8" 15x ea 8" 2# 15x ea     Step-ups: Lat           Sit to Stands 2 x 5 2x5 2x5 2x5     Side Stepping                    Ther Ex           NuStep L5 10' L5 10'  L5 10' L5 10'     SLR AROM Jones 2x10  2# 2x10 ea Jones 2x10  2# 2x10 ea 2# 2x10ea 2# 2x10 ea     Clamshells Supine L2 3x10 L3 2x10 L3 2x10ea L3 3x10 ea     Quad Sets          Standing: HR 2x10 L1# R2#   2x10 ea 2# 2x10ea 2# 2x10     Seated: TR 2x10 L1# R2#   2x10 ea 2# 2x10ea 2# 2x10     LAQ 2# 2x10 ea 2# 2x10 ea 2# 2x10ea 3# 3x10 ea     SAQ 2# 3x10 ea 2# 3x10 ea 2# 3x10ea 3# 3x10 ea     HC L1 2x10 ea L2 2x10 ea  L2 2x10ea L3 2x10 ea     Standing: Hip FLX         Standing: Hip ABD 2x10 ea  1# L1# R2#   2x10 ea 2# 2x10ea 2# 2x10 ea     Standing: Hip Ext          Seated FLX c/ SB  10x ea way 10x ea way 10x ea way     Hip Add w/Ball 5" x 20 5"x20 5" x 15       Ther Activity           Stair Negotiation           Obstacle Course                                       Gait Training           Ambulation c/ SPC                       Modalities            MHP   MHP w/ supine TE  MHP c/ supine TE

## 2022-11-10 NOTE — TELEPHONE ENCOUNTER
PATIENT HAS 2 ATIVAN LEFT AND DOES NOT HAVE AN APPT TO [facility] UNTIL 11/18/22    CAN YOU REORDER TO COVER HIM UNTIL THEN AT LEAST

## 2022-11-11 RX ORDER — LORAZEPAM 0.5 MG/1
0.5 TABLET ORAL
Qty: 30 TABLET | Refills: 0 | Status: SHIPPED | OUTPATIENT
Start: 2022-11-11

## 2022-11-14 ENCOUNTER — APPOINTMENT (OUTPATIENT)
Dept: LAB | Facility: MEDICAL CENTER | Age: 57
End: 2022-11-14

## 2022-11-14 ENCOUNTER — OFFICE VISIT (OUTPATIENT)
Dept: PHYSICAL THERAPY | Facility: CLINIC | Age: 57
End: 2022-11-14

## 2022-11-14 DIAGNOSIS — R26.2 AMBULATORY DYSFUNCTION: Primary | ICD-10-CM

## 2022-11-14 DIAGNOSIS — E83.42 HYPOMAGNESEMIA: ICD-10-CM

## 2022-11-14 DIAGNOSIS — E87.1 CHRONIC HYPONATREMIA: ICD-10-CM

## 2022-11-14 LAB
ALBUMIN SERPL BCP-MCNC: 3.6 G/DL (ref 3.5–5)
ALP SERPL-CCNC: 138 U/L (ref 46–116)
ALT SERPL W P-5'-P-CCNC: 17 U/L (ref 12–78)
ANION GAP SERPL CALCULATED.3IONS-SCNC: 7 MMOL/L (ref 4–13)
AST SERPL W P-5'-P-CCNC: 25 U/L (ref 5–45)
BILIRUB SERPL-MCNC: 0.39 MG/DL (ref 0.2–1)
BUN SERPL-MCNC: 31 MG/DL (ref 5–25)
CALCIUM SERPL-MCNC: 9.4 MG/DL (ref 8.3–10.1)
CHLORIDE SERPL-SCNC: 89 MMOL/L (ref 96–108)
CO2 SERPL-SCNC: 25 MMOL/L (ref 21–32)
CREAT SERPL-MCNC: 1.01 MG/DL (ref 0.6–1.3)
GFR SERPL CREATININE-BSD FRML MDRD: 82 ML/MIN/1.73SQ M
GLUCOSE P FAST SERPL-MCNC: 69 MG/DL (ref 65–99)
MAGNESIUM SERPL-MCNC: 1.7 MG/DL (ref 1.6–2.6)
OSMOLALITY UR/SERPL-RTO: 268 MMOL/KG (ref 282–298)
OSMOLALITY UR: 248 MMOL/KG
POTASSIUM SERPL-SCNC: 4.2 MMOL/L (ref 3.5–5.3)
PROT SERPL-MCNC: 8.2 G/DL (ref 6.4–8.4)
SODIUM 24H UR-SCNC: 30 MOL/L
SODIUM SERPL-SCNC: 121 MMOL/L (ref 135–147)

## 2022-11-14 NOTE — PROGRESS NOTES
Daily Note     Today's date: 2022  Patient name: Yumiko Downing  : 1965  MRN: [id number]  Referring provider: Terrence Morales DO  Dx:   Encounter Diagnosis     ICD-10-CM    1  Ambulatory dysfunction  R26 2        Start Time: 0800  Stop Time: 0910  Total time in clinic (min): 70 minutes    Subjective: Overall feels improved in strength, but notes his gait speed slows down as the weather gets colder outside  Continues to report difficulty ascending stairs, has no difficulty descending stairs  Objective: See treatment diary below      Assessment: Tolerated treatment well  Patient demonstrated fatigue post treatment, exhibited good technique with therapeutic exercises and would benefit from continued PT  Trialed addition of leg press today to work on additional LE strength, stopped at 10 rep to avoid aggravation of LBP  Will re-assess c/ continuing exercise at [place]  Progressed resistance c/ NuStep this visit, which patient verbalized resulted in an increase in LE muscle fatigue  Plan: Continue per plan of care        Precautions: FALL RISK, Use cushion on chairs for coccyx, Hx of Seizures, 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    Manuals 10/31 11/3  11/7 11/10 11/14    HS/calves   ML CM  KS CM                             Neuro Re-Ed           NBOS           SLS           Tandem Walking  5x // 5x//  5x// 5x// 5x // c/ 2#    Side Stepping 3x // L1 3x// 2# 3x// L2 20x L2 20x    Hurdles: Fwd & Lat Low 4x ea // Low 5x// ea  High 10x// 2#/0# High 2# 10x ea High 2# 10x ea    Step-ups: Fwd 8" 10x ea 8" 15x ea  8" 15x ea 8" 2# 15x ea 8" 2# 15x    Step-ups: Lat           Sit to Stands 2 x 5 2x5 2x5 2x5 1x10               Ther Ex           NuStep L5 10' L5 10'  L5 10' L5 10' L6 10'    SLR AROM Jones 2x10  2# 2x10 ea Jones 2x10  2# 2x10 ea 2# 2x10ea 2# 2x10 ea 2# 2x10ea    Clamshells Supine L2 3x10 L3 2x10 L3 2x10ea L3 3x10 ea L3 3x10ea    Quad Sets          Standing: HR 2x10 L1# R2#   2x10 ea 2# 2x10ea 2# 2x10 2# 2x10 Seated: TR 2x10 L1# R2#   2x10 ea 2# 2x10ea 2# 2x10 2# 2x10    LAQ 2# 2x10 ea 2# 2x10 ea 2# 2x10ea 3# 3x10 ea 3# 3x10ea    SAQ 2# 3x10 ea 2# 3x10 ea 2# 3x10ea 3# 3x10 ea 3# 3x10ea    HC L1 2x10 ea L2 2x10 ea  L2 2x10ea L3 2x10 ea L3 2x10ea    Standing: Hip FLX         Standing: Hip ABD 2x10 ea  1# L1# R2#   2x10 ea 2# 2x10ea 2# 2x10 ea 2# 2x10    Standing: Hip Ext          Seated FLX c/ SB: 3 way  10x ea way 10x ea way 10x ea way 10x ea way    Hip Add w/Ball 5" x 20 5"x20 5" x 15   5"x15    Leg Press: S5     P1 x10             Ther Activity           Stair Negotiation           Obstacle Course                                       Gait Training           Ambulation c/ SPC                       Modalities            MHP   MHP w/ supine TE  MHP c/ supine TE

## 2022-11-17 ENCOUNTER — OFFICE VISIT (OUTPATIENT)
Dept: PHYSICAL THERAPY | Facility: CLINIC | Age: 57
End: 2022-11-17

## 2022-11-17 DIAGNOSIS — R26.2 AMBULATORY DYSFUNCTION: Primary | ICD-10-CM

## 2022-11-17 NOTE — PROGRESS NOTES
Daily Note     Today's date: 2022  Patient name: Layla Friend  : 1965  MRN: [id number]  Referring provider: Jt Ferrara DO  Dx:   Encounter Diagnosis     ICD-10-CM    1  Ambulatory dysfunction  R26 2           Start Time: 0800  Stop Time: 0900  Total time in clinic (min): 60 minutes    Subjective: Patient reports he is stiff this morning, since it is cold outside  Back is more sore because of this as well  Objective: See treatment diary below      Assessment: Tolerated treatment well  Patient demonstrated fatigue post treatment, exhibited good technique with therapeutic exercises and would benefit from continued PT  Demonstrates globally more stiffness this visit, likely due to the weather as his appointment is at the same time as previous appointments  Maintained new resistances added at most recent visits due to more stiffness this visit  Patient appropriately challenged by resistances used  Plan: Continue per plan of care        Precautions: FALL RISK, Use cushion on chairs for coccyx, Hx of Seizures, 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    Manuals 10/31 11/3  11/7 11/10 11/14 11/17   HS/calves   ML CM  KS CM  JM                           Neuro Re-Ed           NBOS           SLS           Tandem Walking  5x // 5x//  5x// 5x// 5x // c/ 2# 5x // c/ 2#   Side Stepping 3x // L1 3x// 2# 3x// L2 20x L2 20x L2 20x   Hurdles: Fwd & Lat Low 4x ea // Low 5x// ea  High 10x// 2#/0# High 2# 10x ea High 2# 10x ea High 2# 10x ea   Step-ups: Fwd 8" 10x ea 8" 15x ea  8" 15x ea 8" 2# 15x ea 8" 2# 15x 8" 2# 15x   Step-ups: Lat           Sit to Stands 2 x 5 2x5 2x5 2x5 1x10 1x10              Ther Ex           NuStep L5 10' L5 10'  L5 10' L5 10' L6 10' L6 10'   SLR AROM Jones 2x10  2# 2x10 ea Jones 2x10  2# 2x10 ea 2# 2x10ea 2# 2x10 ea 2# 2x10ea 3# 2x10ea   Clamshells Supine L2 3x10 L3 2x10 L3 2x10ea L3 3x10 ea L3 3x10ea L3 3x10ea   Quad Sets          Standing: HR 2x10 L1# R2#   2x10 ea 2# 2x10ea 2# 2x10 2# 2x10 Labor Instructions (37 - 39 weeks):  Call your physician or return to hospital if:  · You have regular contractions that get progressively closer, longer and stronger.  · Your water breaks (remember time and color).  · You have bleeding like a period.  · Your baby does not move enough to complete the daily kick counts (10 movements in 2 hours)  · Your baby moves much less often than on the days before or you have not felt your baby move all day.        COVID-19  COVID-19 is a respiratory infection that is caused by a virus called severe acute respiratory syndrome coronavirus 2 (SARS-CoV-2). The disease is also known as coronavirus disease or novel coronavirus. In some people, the virus may not cause any symptoms. In others, it may cause a serious infection. The infection can get worse quickly and can lead to complications, such as:  · Pneumonia, or infection of the lungs.  · Acute respiratory distress syndrome or ARDS. This is fluid build-up in the lungs.  · Acute respiratory failure. This is a condition in which there is not enough oxygen passing from the lungs to the body.  · Sepsis or septic shock. This is a serious bodily reaction to an infection.  · Blood clotting problems.  · Secondary infections due to bacteria or fungus.  The virus that causes COVID-19 is contagious. This means that it can spread from person to person through droplets from coughs and sneezes (respiratory secretions).  What are the causes?  This illness is caused by a virus. You may catch the virus by:  · Breathing in droplets from an infected person's cough or sneeze.  · Touching something, like a table or a doorknob, that was exposed to the virus (contaminated) and then touching your mouth, nose, or eyes.  What increases the risk?  Risk for infection  You are more likely to be infected with this virus if you:  · Live in or travel to an area with a COVID-19 outbreak.  · Come in contact with a sick person who recently traveled to an area with  2# 2x10   Seated: TR 2x10 L1# R2#   2x10 ea 2# 2x10ea 2# 2x10 2# 2x10 2# 2x10   LAQ 2# 2x10 ea 2# 2x10 ea 2# 2x10ea 3# 3x10 ea 3# 3x10ea 3# 3x10ea   SAQ 2# 3x10 ea 2# 3x10 ea 2# 3x10ea 3# 3x10 ea 3# 3x10ea 3# 3x10ea   HC L1 2x10 ea L2 2x10 ea  L2 2x10ea L3 2x10 ea L3 2x10ea L3 2x10ea   Standing: Hip FLX         Standing: Hip ABD 2x10 ea  1# L1# R2#   2x10 ea 2# 2x10ea 2# 2x10 ea 2# 2x10 2# 2x10   Standing: Hip Ext          Seated FLX c/ SB: 3 way  10x ea way 10x ea way 10x ea way 10x ea way 10x ea way   Hip Add w/Ball 5" x 20 5"x20 5" x 15   5"x15 5"x15   Leg Press: S3     P1 x10 P1 x10            Ther Activity           Stair Negotiation           Obstacle Course                                       Gait Training           Ambulation c/ SPC                       Modalities            MHP   MHP w/ supine TE  MHP c/ supine TE   MHP c/ supine                    a COVID-19 outbreak.  · Provide care for or live with a person who is infected with COVID-19.  Risk for serious illness  You are more likely to become seriously ill from the virus if you:  · Are 65 years of age or older.  · Have a long-term disease that lowers your body's ability to fight infection (immunocompromised).  · Live in a nursing home or long-term care facility.  · Have a long-term (chronic) disease such as:  ? Chronic lung disease, including chronic obstructive pulmonary disease or asthma  ? Heart disease.  ? Diabetes.  ? Chronic kidney disease.  ? Liver disease.  · Are obese.  What are the signs or symptoms?  Symptoms of this condition can range from mild to severe. Symptoms may appear any time from 2 to 14 days after being exposed to the virus. They include:  · A fever.  · A cough.  · Difficulty breathing.  · Chills.  · Muscle pains.  · A sore throat.  · Loss of taste or smell.  Some people may also have stomach problems, such as nausea, vomiting, or diarrhea.  Other people may not have any symptoms of COVID-19.  How is this diagnosed?  This condition may be diagnosed based on:  · Your signs and symptoms, especially if:  ? You live in an area with a COVID-19 outbreak.  ? You recently traveled to or from an area where the virus is common.  ? You provide care for or live with a person who was diagnosed with COVID-19.  · A physical exam.  · Lab tests, which may include:  ? A nasal swab to take a sample of fluid from your nose.  ? A throat swab to take a sample of fluid from your throat.  ? A sample of mucus from your lungs (sputum).  ? Blood tests.  · Imaging tests, which may include, X-rays, CT scan, or ultrasound.  How is this treated?  At present, there is no medicine to treat COVID-19. Medicines that treat other diseases are being used on a trial basis to see if they are effective against COVID-19.  Your health care provider will talk with you about ways to treat your symptoms. For most people, the  infection is mild and can be managed at home with rest, fluids, and over-the-counter medicines.  Treatment for a serious infection usually takes places in a hospital intensive care unit (ICU). It may include one or more of the following treatments. These treatments are given until your symptoms improve.  · Receiving fluids and medicines through an IV.  · Supplemental oxygen. Extra oxygen is given through a tube in the nose, a face mask, or a martinez.  · Positioning you to lie on your stomach (prone position). This makes it easier for oxygen to get into the lungs.  · Continuous positive airway pressure (CPAP) or bi-level positive airway pressure (BPAP) machine. This treatment uses mild air pressure to keep the airways open. A tube that is connected to a motor delivers oxygen to the body.  · Ventilator. This treatment moves air into and out of the lungs by using a tube that is placed in your windpipe.  · Tracheostomy. This is a procedure to create a hole in the neck so that a breathing tube can be inserted.  · Extracorporeal membrane oxygenation (ECMO). This procedure gives the lungs a chance to recover by taking over the functions of the heart and lungs. It supplies oxygen to the body and removes carbon dioxide.  Follow these instructions at home:  Lifestyle  · If you are sick, stay home except to get medical care. Your health care provider will tell you how long to stay home. Call your health care provider before you go for medical care.  · Rest at home as told by your health care provider.  · Do not use any products that contain nicotine or tobacco, such as cigarettes, e-cigarettes, and chewing tobacco. If you need help quitting, ask your health care provider.  · Return to your normal activities as told by your health care provider. Ask your health care provider what activities are safe for you.  General instructions  · Take over-the-counter and prescription medicines only as told by your health care  provider.  · Drink enough fluid to keep your urine pale yellow.  · Keep all follow-up visits as told by your health care provider. This is important.  How is this prevented?    There is no vaccine to help prevent COVID-19 infection. However, there are steps you can take to protect yourself and others from this virus.  To protect yourself:   · Do not travel to areas where COVID-19 is a risk. The areas where COVID-19 is reported change often. To identify high-risk areas and travel restrictions, check the CDC travel website: wwwnc.cdc.gov/travel/notices  · If you live in, or must travel to, an area where COVID-19 is a risk, take precautions to avoid infection.  ? Stay away from people who are sick.  ? Wash your hands often with soap and water for 20 seconds. If soap and water are not available, use an alcohol-based hand .  ? Avoid touching your mouth, face, eyes, or nose.  ? Avoid going out in public, follow guidance from your state and local health authorities.  ? If you must go out in public, wear a cloth face covering or face mask.  ? Disinfect objects and surfaces that are frequently touched every day. This may include:  § Counters and tables.  § Doorknobs and light switches.  § Sinks and faucets.  § Electronics, such as phones, remote controls, keyboards, computers, and tablets.  To protect others:  If you have symptoms of COVID-19, take steps to prevent the virus from spreading to others.  · If you think you have a COVID-19 infection, contact your health care provider right away. Tell your health care team that you think you may have a COVID-19 infection.  · Stay home. Leave your house only to seek medical care. Do not use public transport.  · Do not travel while you are sick.  · Wash your hands often with soap and water for 20 seconds. If soap and water are not available, use alcohol-based hand .  · Stay away from other members of your household. Let healthy household members care for children  and pets, if possible. If you have to care for children or pets, wash your hands often and wear a mask. If possible, stay in your own room, separate from others. Use a different bathroom.  · Make sure that all people in your household wash their hands well and often.  · Cough or sneeze into a tissue or your sleeve or elbow. Do not cough or sneeze into your hand or into the air.  · Wear a cloth face covering or face mask.  Where to find more information  · Centers for Disease Control and Prevention: www.cdc.gov/coronavirus/2019-ncov/index.html  · World Health Organization: www.who.int/health-topics/coronavirus  Contact a health care provider if:  · You live in or have traveled to an area where COVID-19 is a risk and you have symptoms of the infection.  · You have had contact with someone who has COVID-19 and you have symptoms of the infection.  Get help right away if:  · You have trouble breathing.  · You have pain or pressure in your chest.  · You have confusion.  · You have bluish lips and fingernails.  · You have difficulty waking from sleep.  · You have symptoms that get worse.  These symptoms may represent a serious problem that is an emergency. Do not wait to see if the symptoms will go away. Get medical help right away. Call your local emergency services (911 in the [place] Do not drive yourself to the hospital. Let the emergency medical personnel know if you think you have COVID-19.  Summary  · COVID-19 is a respiratory infection that is caused by a virus. It is also known as coronavirus disease or novel coronavirus. It can cause serious infections, such as pneumonia, acute respiratory distress syndrome, acute respiratory failure, or sepsis.  · The virus that causes COVID-19 is contagious. This means that it can spread from person to person through droplets from coughs and sneezes.  · You are more likely to develop a serious illness if you are 65 years of age or older, have a weak immunity, live in a nursing  home, or have chronic disease.  · There is no medicine to treat COVID-19. Your health care provider will talk with you about ways to treat your symptoms.  · Take steps to protect yourself and others from infection. Wash your hands often and disinfect objects and surfaces that are frequently touched every day. Stay away from people who are sick and wear a mask if you are sick.  This information is not intended to replace advice given to you by your health care provider. Make sure you discuss any questions you have with your health care provider.  Document Released: 01/23/2020 Document Revised: 05/14/2020 Document Reviewed: 01/23/2020  [employer] Patient Education © 2020 [employer]

## 2022-11-18 ENCOUNTER — APPOINTMENT (OUTPATIENT)
Dept: RADIOLOGY | Facility: MEDICAL CENTER | Age: 57
End: 2022-11-18

## 2022-11-18 ENCOUNTER — OFFICE VISIT (OUTPATIENT)
Dept: FAMILY MEDICINE CLINIC | Facility: CLINIC | Age: 57
End: 2022-11-18

## 2022-11-18 VITALS
BODY MASS INDEX: 19.07 KG/M2 | WEIGHT: 107.6 LBS | RESPIRATION RATE: 18 BRPM | HEART RATE: 80 BPM | TEMPERATURE: 95.4 F | OXYGEN SATURATION: 94 % | SYSTOLIC BLOOD PRESSURE: 97 MMHG | HEIGHT: 63 IN | DIASTOLIC BLOOD PRESSURE: 80 MMHG

## 2022-11-18 DIAGNOSIS — E87.1 CHRONIC HYPONATREMIA: Primary | ICD-10-CM

## 2022-11-18 DIAGNOSIS — S43.004A SHOULDER DISLOCATION, RIGHT, INITIAL ENCOUNTER: Primary | ICD-10-CM

## 2022-11-18 DIAGNOSIS — M25.511 CHRONIC RIGHT SHOULDER PAIN: ICD-10-CM

## 2022-11-18 DIAGNOSIS — S43.004A SHOULDER DISLOCATION, RIGHT, INITIAL ENCOUNTER: ICD-10-CM

## 2022-11-18 DIAGNOSIS — G89.29 CHRONIC RIGHT SHOULDER PAIN: ICD-10-CM

## 2022-11-18 DIAGNOSIS — E22.2 SIADH (SYNDROME OF INAPPROPRIATE ADH PRODUCTION) (HCC): ICD-10-CM

## 2022-11-18 RX ORDER — OXYCODONE HYDROCHLORIDE AND ACETAMINOPHEN 5; 325 MG/1; MG/1
1 TABLET ORAL EVERY 8 HOURS PRN
Qty: 9 TABLET | Refills: 0 | Status: SHIPPED | OUTPATIENT
Start: 2022-11-18

## 2022-11-18 RX ORDER — KETOROLAC TROMETHAMINE 30 MG/ML
30 INJECTION, SOLUTION INTRAMUSCULAR; INTRAVENOUS ONCE
Status: COMPLETED | OUTPATIENT
Start: 2022-11-18 | End: 2022-11-18

## 2022-11-18 RX ADMIN — KETOROLAC TROMETHAMINE 30 MG: 30 INJECTION, SOLUTION INTRAMUSCULAR; INTRAVENOUS at 09:19

## 2022-11-18 NOTE — PROGRESS NOTES
Assessment/Plan     Diagnoses and all orders for this visit:    Chronic hyponatremia  Comments:  advised to fluid restrict to 1L/day  advised to reduce Bumex to 1mg once daily  advised to increase Na in diet  referral placed to nephrology  Orders:  -     Ambulatory Referral to Nephrology; Future    SIADH (syndrome of inappropriate ADH production) ([facility] )  Comments:  see above plan  Orders:  -     Ambulatory Referral to Nephrology; Future    Shoulder dislocation, right, initial encounter  Comments:  attempted reduction in office   send for xrays of shoulder  arm placed in sling  IM toradol given today for pain  short-course of oxydocone provided for pain  Orders:  -     XR shoulder 2+ vw right; Future  -     oxyCODONE-acetaminophen (Percocet) 5-325 mg per tablet; Take 1 tablet by mouth every 8 (eight) hours as needed for severe pain Max Daily Amount: 3 tablets  -     ketorolac (TORADOL) injection 30 mg      Further plan pending results of shoulder xray  Will forward patients information to Dr Xochitl Aguayo with orthopedics  The patient indicates understanding of these issues and agrees with the plan  Return in about 2 weeks (around 12/2/2022) for Recheck  Subjective     Patient Id: Jazzmine Schmid is a 65 y o  male    HPI    Here today to f/u on hyponatremia and to review labs  Labs indicative of SIADH vs diuretic use  He has been advised to fluid restrict   He was intolerant of oral NaCl  He has continued to take Bumex 1mg BID despite being advised to taking once daily  He continues to drink three 12 oz non-alcoholic beers per day, one 16 oz gatorade, and two 16 oz bottled sr per day  This puts him at just under 3L po fluids per day       Also notes right shoulder pain   Recently hospitalized and notes that he thinks staff accidentally dislocated it when repositioning him in bed  2-3 days ago he noted a pop in the shoulder  He notes restricted movement and ongoing popping within the shoulder  He feels like his shoulder is currently dislocated  Hx of ORIF of prox humeral fx and prior right clavicular fracture  Otherwise patient is feeling well today and is without other complaints  Review of Systems   Constitutional: Negative for chills and fever  Respiratory: Negative for shortness of breath  Cardiovascular: Negative for chest pain  Gastrointestinal: Negative for abdominal pain, constipation, diarrhea, nausea and vomiting  Genitourinary: Negative for dysuria  Musculoskeletal: Positive for arthralgias  Negative for joint swelling and myalgias  Skin: Negative for rash  Neurological: Negative for seizures and headaches  Psychiatric/Behavioral: Negative for dysphoric mood  All other systems reviewed and are negative  Past Medical History:   Diagnosis Date   • Alcohol abuse    • Traore esophagus    • Bowel obstruction (HCC)    • Bowel perforation (HCC)    • Cardiac disease    • Continuous chronic alcoholism ([facility] ) 10/5/2017   • COPD (chronic obstructive pulmonary disease) (HCC)    • History of shoulder surgery     Right shoulder   • History of transfusion    • Hx of cervical spine surgery    • Hypertension    • Incisional hernia 10/8/2017   • MI, old    • Mitral regurgitation    • Psychiatric disorder    • Seizures ([facility] )        Past Surgical History:   Procedure Laterality Date   • APPENDECTOMY     • BACK SURGERY     • CHOLECYSTECTOMY     • ESOPHAGOGASTRODUODENOSCOPY N/A 11/28/2016    Procedure: ESOPHAGOGASTRODUODENOSCOPY (EGD); Surgeon: Erin Weston MD;  Location: [facility];   Service:    • GALLBLADDER SURGERY     • LAPAROTOMY N/A 10/25/2016    Procedure: Elieser Hoskins;  Surgeon: Michael Oneal MD;  Location: [facility];  Service:    • MOUTH SURGERY     • PERCUTANEOUS PINNING FEMORAL NECK FRACTURE     • SHOULDER SURGERY Right    • SHOULDER SURGERY     • SMALL INTESTINE SURGERY     • STOMACH SURGERY      bal surgery       Family History   Problem Relation Age of Onset   • Breast cancer Mother    • Prostate cancer Father    • Skin cancer Brother        No Known Allergies      Current Outpatient Medications:   •  albuterol (2 5 mg/3 mL) 0 083 % nebulizer solution, Take 2 5 mg by nebulization every 6 (six) hours as needed for wheezing or shortness of breath, Disp: , Rfl:   •  albuterol (ACCUNEB) 0 63 MG/3ML nebulizer solution, , Disp: , Rfl:   •  albuterol (PROVENTIL HFA,VENTOLIN HFA) 90 mcg/act inhaler, Inhale 2 puffs every 4 (four) hours as needed for wheezing or shortness of breath, Disp: 18 g, Rfl: 2  •  bumetanide (BUMEX) 1 mg tablet, Take 1 tablet (1 mg total) by mouth daily, Disp: 60 tablet, Rfl: 2  •  Cholecalciferol 25 MCG (1000 UT) tablet, Take 1 tablet (1,000 Units total) by mouth daily, Disp: 30 tablet, Rfl: 0  •  cyanocobalamin (VITAMIN B-12) 100 mcg tablet, Take 1 tablet (100 mcg total) by mouth daily, Disp: 30 tablet, Rfl: 5  •  dextromethorphan-guaifenesin (MUCINEX DM)  MG per 12 hr tablet, Take 1 tablet by mouth every 12 (twelve) hours, Disp: 14 tablet, Rfl: 0  •  Diclofenac Sodium (VOLTAREN) 1 %, Apply 2 g topically 4 (four) times a day, Disp: 100 g, Rfl: 1  •  diphenhydrAMINE (SOMINEX) 25 MG tablet, Take 25 mg by mouth, Disp: , Rfl:   •  docusate sodium (COLACE) 100 mg capsule, Take 1 capsule (100 mg total) by mouth 2 (two) times a day as needed for constipation, Disp: 30 capsule, Rfl: 0  •  ergocalciferol (VITAMIN D2) 50,000 units, Take 1 capsule (50,000 Units total) by mouth once a week, Disp: 13 capsule, Rfl: 1  •  esomeprazole (NexIUM) 40 MG capsule, Take 1 capsule (40 mg total) by mouth 2 (two) times a day before meals, Disp: 60 capsule, Rfl: 3  •  folic acid (FOLVITE) 1 mg tablet, Take 1 tablet (1 mg total) by mouth daily, Disp: 30 tablet, Rfl: 0  •  hydrocortisone (CORTEF) 10 mg tablet, Take 5 tablets (50 mg total) by mouth 2 (two) times a day, Disp: 60 tablet, Rfl: 0  •  hydrOXYzine HCL (ATARAX) 25 mg tablet, Take 1 tablet (25 mg total) by mouth every 6 (six) hours as needed for itching, allergies or anxiety, Disp: 90 tablet, Rfl: 0  •  lactulose (CHRONULAC) 10 g/15 mL solution, , Disp: , Rfl:   •  levETIRAcetam (KEPPRA) 1000 MG tablet, Take 1 tablet (1,000 mg total) by mouth every 12 (twelve) hours, Disp: 60 tablet, Rfl: 2  •  lisinopril (ZESTRIL) 10 mg tablet, Take 1 tablet (10 mg total) by mouth daily, Disp: 30 tablet, Rfl: 5  •  loratadine (Claritin Reditabs) 10 MG dissolvable tablet, Take 10 mg by mouth daily, Disp: , Rfl:   •  LORazepam (ATIVAN) 0 5 mg tablet, Take 1 tablet (0 5 mg total) by mouth daily at bedtime, Disp: 30 tablet, Rfl: 0  •  Multiple Vitamin (TAB-A-BONI PO), Take 1 tablet by mouth daily, Disp: , Rfl:   •  multivitamin (THERAGRAN) TABS, Take 1 tablet by mouth daily, Disp: , Rfl:   •  oxyCODONE-acetaminophen (Percocet) 5-325 mg per tablet, Take 1 tablet by mouth every 8 (eight) hours as needed for severe pain Max Daily Amount: 3 tablets, Disp: 9 tablet, Rfl: 0  •  pancrelipase, Lip-Prot-Amyl, (CREON) 6,000 units delayed release capsule, Take 6,000 units of lipase by mouth 3 (three) times a day with meals, Disp: 90 capsule, Rfl: 2  •  spironolactone (ALDACTONE) 100 mg tablet, Take 1 tablet (100 mg total) by mouth daily, Disp: 90 tablet, Rfl: 3  •  thiamine 100 MG tablet, Take 1 tablet (100 mg total) by mouth daily, Disp: 30 tablet, Rfl: 0  •  polyethylene glycol (GOLYTELY) 4000 mL solution, Take 4,000 mL by mouth once for 1 dose (Patient not taking: No sig reported), Disp: 4000 mL, Rfl: 0  •  potassium chloride (K-DUR,KLOR-CON) 20 mEq tablet, Take 1 tablet (20 mEq total) by mouth daily for 1 dose (Patient not taking: No sig reported), Disp: 2 tablet, Rfl: 0  No current facility-administered medications for this visit      Objective     Vitals:    11/18/22 0832   BP: 97/80   Pulse: 80   Resp: 18   Temp: (!) 95 4 °F (35 2 °C)   SpO2: 94%   Weight: 48 8 kg (107 lb 9 6 oz)   Height: 5' 3" (1 6 m)       Physical Exam  Vitals and nursing note reviewed  Constitutional:       General: He is not in acute distress  Appearance: Normal appearance  He is not ill-appearing or toxic-appearing  Comments: Thin appearing   HENT:      Head: Normocephalic and atraumatic  Mouth/Throat:      Mouth: Mucous membranes are moist    Eyes:      Extraocular Movements: Extraocular movements intact  Cardiovascular:      Rate and Rhythm: Normal rate and regular rhythm  Pulses: Normal pulses  Heart sounds: No murmur heard  No gallop  Pulmonary:      Effort: Pulmonary effort is normal  No respiratory distress  Breath sounds: Normal breath sounds  No wheezing, rhonchi or rales  Abdominal:      Palpations: Abdomen is soft  Tenderness: There is no abdominal tenderness  Musculoskeletal:      Right shoulder: Deformity, tenderness, bony tenderness and crepitus present  Decreased range of motion  Decreased strength  Normal pulse  Cervical back: Neck supple  Comments: Proximal humeral head anterior to [facility] joint  Skin:     General: Skin is warm  Neurological:      General: No focal deficit present  Mental Status: He is alert and oriented to person, place, and time  Mental status is at baseline     Psychiatric:         Mood and Affect: Mood normal          Behavior: Behavior normal          Diogenes Frankel

## 2022-11-21 ENCOUNTER — OFFICE VISIT (OUTPATIENT)
Dept: PHYSICAL THERAPY | Facility: CLINIC | Age: 57
End: 2022-11-21

## 2022-11-21 DIAGNOSIS — R26.2 AMBULATORY DYSFUNCTION: Primary | ICD-10-CM

## 2022-11-21 NOTE — PROGRESS NOTES
Daily Note     Today's date: 2022  Patient name: Rubbie Prader  : 1965  MRN: [id number]  Referring provider: Debra Ramos DO  Dx:   Encounter Diagnosis     ICD-10-CM    1  Ambulatory dysfunction  R26 2                      Subjective: Patient reports that his strength is improving  Objective: See treatment diary below  Progressed hs curls today  Increased resistance on SLR and SAQ  Assessment: Tolerated treatment well  Patient demonstrated fatigue post treatment, exhibited good technique with therapeutic exercises and would benefit from continued PT to improve strength and balance stability  Patient is doing well and is able to tolerate more reps and resistance  HS mobility is improving  Plan: Continue per plan of care        Precautions: FALL RISK, Use cushion on chairs for coccyx, Hx of Seizures, 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    Manuals 11/21 11/3  11/7 11/10 11/14 11/17   HS/calves  CM CM  KS CM  JM                           Neuro Re-Ed           NBOS           SLS           Tandem Walking  5x // c/ 3# 5x//  5x// 5x// 5x // c/ 2# 5x // c/ 2#   Side Stepping L3 5x// L1 3x// 2# 3x// L2 20x L2 20x L2 20x   Hurdles: Fwd & Lat High 3# 10x ea Low 5x// ea  High 10x// 2#/0# High 2# 10x ea High 2# 10x ea High 2# 10x ea   Step-ups: Fwd 8" 3# 2x10  8" 15x ea  8" 15x ea 8" 2# 15x ea 8" 2# 15x 8" 2# 15x   Step-ups: Lat          Sit to Stands 1x10 2x5 2x5 2x5 1x10 1x10             Ther Ex          NuStep L6 10' L5 10'  L5 10' L5 10' L6 10' L6 10'   SLR 3# 2x10 ea Jones 2x10  2# 2x10 ea 2# 2x10ea 2# 2x10 ea 2# 2x10ea 3# 2x10ea   Clamshells  L3 2x10 L3 2x10ea L3 3x10 ea L3 3x10ea L3 3x10ea   Quad Sets         Standing: HR 3# 3x10 L1# R2#   2x10 ea 2# 2x10ea 2# 2x10 2# 2x10 2# 2x10   Seated: TR 3# 3x10 L1# R2#   2x10 ea 2# 2x10ea 2# 2x10 2# 2x10 2# 2x10   LAQ 3# 3x10 2# 2x10 ea 2# 2x10ea 3# 3x10 ea 3# 3x10ea 3# 3x10ea   SAQ 5# 3x10 2# 3x10 ea 2# 3x10ea 3# 3x10 ea 3# 3x10ea 3# 3x10ea   HC HS curl machine P4 2x10 L2 2x10 ea  L2 2x10ea L3 2x10 ea L3 2x10ea L3 2x10ea   Standing: Hip FLX 3# 3x10        Standing: Hip ABD 3# 3x10 L1# R2#   2x10 ea 2# 2x10ea 2# 2x10 ea 2# 2x10 2# 2x10   Standing: Hip Ext          Seated FLX c/ SB: 3 way 10x ea way 10x ea way 10x ea way 10x ea way 10x ea way 10x ea way   Hip Add w/Ball  5"x20 5" x 15   5"x15 5"x15   Leg Press: S3 P1 2x10     P1 x10 P1 x10            Ther Activity          Stair Negotiation          Obstacle Course                                      Gait Training          Ambulation c/ SPC                     Modalities           MHP MHP wth supine TE MHP w/ supine TE  MHP c/ supine TE   MHP c/ supine

## 2022-11-23 ENCOUNTER — OFFICE VISIT (OUTPATIENT)
Dept: PHYSICAL THERAPY | Facility: CLINIC | Age: 57
End: 2022-11-23

## 2022-11-23 DIAGNOSIS — R60.9 FLUID RETENTION: ICD-10-CM

## 2022-11-23 DIAGNOSIS — R26.2 AMBULATORY DYSFUNCTION: Primary | ICD-10-CM

## 2022-11-23 NOTE — PROGRESS NOTES
Daily Note     Today's date: 2022  Patient name: Axel Braga  : 1965  MRN: [id number]  Referring provider: Palmer Steward DO  Dx:   Encounter Diagnosis     ICD-10-CM    1  Ambulatory dysfunction  R26 2           Start Time: 0800  Stop Time: 0900  Total time in clinic (min): 60 minutes    Subjective: No significant changes since last visit  Feels "good" today  Objective: See treatment diary below      Assessment: Tolerated treatment well  Patient demonstrated fatigue post treatment, exhibited good technique with therapeutic exercises and would benefit from continued PT  Utilized RLE more during HC's due to more strength on that side, will benefit from transition to SL HCs on machine nv  Progressed resistance c/ clamshells and patient able to perform c/ increased muscular fatigue  Plan: Continue per plan of care  Will transition HS curls to single limb       Precautions: FALL RISK, Use cushion on chairs for coccyx, Hx of Seizures, 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    Manuals 11/21 11/23  11/10 11/14 11/17   HS/calves  CM KS  CM  JM                           Neuro Re-Ed           NBOS           SLS           Tandem Walking  5x // c/ 3# 5x// 3#  5x// 5x // c/ 2# 5x // c/ 2#   Side Stepping L3 5x// L3 5x //  L2 20x L2 20x L2 20x   Hurdles: Fwd & Lat High 3# 10x ea High 3# 10x ea  High 2# 10x ea High 2# 10x ea High 2# 10x ea   Step-ups: Fwd 8" 3# 2x10  8" 3# 15x ea  8" 2# 15x ea 8" 2# 15x 8" 2# 15x   Step-ups: Lat          Sit to Stands 1x10 1x10  2x5 1x10 1x10             Ther Ex          NuStep L6 10' L6 10'  L5 10' L6 10' L6 10'   SLR 3# 2x10 ea   3# 2x10ea  2# 2x10 ea 2# 2x10ea 3# 2x10ea   Clamshells  L5 3x10ea  L3 3x10 ea L3 3x10ea L3 3x10ea   Quad Sets         Standing: HR 3# 3x10 3# 3x10  2# 2x10 2# 2x10 2# 2x10   Seated: TR 3# 3x10 3#   3x10  2# 2x10 2# 2x10 2# 2x10   LAQ 3# 3x10 3# 3x10  3# 3x10 ea 3# 3x10ea 3# 3x10ea   SAQ 5# 3x10 5# 3x10  3# 3x10 ea 3# 3x10ea 3# 3x10ea   HC HS curl machine P4 2x10 HS Curl P4 2x10 SL  L3 2x10 ea L3 2x10ea L3 2x10ea   Standing: Hip FLX 3# 3x10 3# 3x10       Standing: Hip ABD 3# 3x10 3# 3x10  2# 2x10 ea 2# 2x10 2# 2x10   Standing: Hip Ext          Seated FLX c/ SB: 3 way 10x ea way 10x ea way  10x ea way 10x ea way 10x ea way   Hip Add w/Ball  5"x15   5"x15 5"x15   Leg Press: S3 P1 2x10  P1 2x10   P1 x10 P1 x10            Ther Activity          Stair Negotiation          Obstacle Course                                      Gait Training          Ambulation c/ SPC                     Modalities           MHP MHP wth supine TE MHP w/ supine TE  MHP c/ supine TE   MHP c/ supine

## 2022-11-24 RX ORDER — BUMETANIDE 1 MG/1
1 TABLET ORAL DAILY
Qty: 30 TABLET | Refills: 2 | Status: SHIPPED | OUTPATIENT
Start: 2022-11-24

## 2022-11-28 ENCOUNTER — OFFICE VISIT (OUTPATIENT)
Dept: PHYSICAL THERAPY | Facility: CLINIC | Age: 57
End: 2022-11-28

## 2022-11-28 DIAGNOSIS — R26.2 AMBULATORY DYSFUNCTION: Primary | ICD-10-CM

## 2022-11-28 NOTE — PROGRESS NOTES
Daily Note     Today's date: 2022  Patient name: Michelle Lainez  : 1965  MRN: [id number]  Referring provider: Leonor Auguste DO  Dx:   Encounter Diagnosis     ICD-10-CM    1  Ambulatory dysfunction  R26 2                      Subjective: Patient reports that he still has weakness in L LE at this time  He is happy with progress at this time  Objective: See treatment diary below  Progressed balance and some TE today  Assessment: Tolerated treatment well  Patient demonstrated fatigue post treatment and would benefit from continued PT to improve B LE strength for functionality, mobility, and balance stability  Patient is unsteady in a SLS with hold and during tandem ambulation with prolong hold  L LE has more weakness, focus on SL unilateral TE to improve this  Plan: Continue per plan of care        Precautions: FALL RISK, Use cushion on chairs for coccyx, Hx of Seizures, 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    Manuals 11/21 11/23 11/28 11/10 11/14 11/17   HS/calves  CM KS CM CM  JM                           Neuro Re-Ed           NBOS           SLS     firm 30"x3 ea      Tandem Walking  5x // c/ 3# 5x// 3# 3" holds 3x//    5x// 5x // c/ 2# 5x // c/ 2#   Side Stepping L3 5x// L3 5x // L3 5x// L2 20x L2 20x L2 20x   Hurdles: Fwd & Lat High 3# 10x ea High 3# 10x ea DC High 2# 10x ea High 2# 10x ea High 2# 10x ea   Step-ups: Fwd 8" 3# 2x10  8" 3# 15x ea 8" 0# 25x ea 8" 2# 15x ea 8" 2# 15x 8" 2# 15x   Step-ups: Lat          Sit to Stands 1x10 1x10 1x15 2x5 1x10 1x10             Ther Ex          NuStep L6 10' L6 10' L6 10' L5 10' L6 10' L6 10'   SLR 3# 2x10 ea 3# 2x10ea 3# 2x10 ea 2# 2x10 ea 2# 2x10ea 3# 2x10ea   Clamshells  L5 3x10ea DC  L3 3x10 ea L3 3x10ea L3 3x10ea   Standing: HR 3# 3x10 3# 3x10 4# 3x10 2# 2x10 2# 2x10 2# 2x10   Seated: TR 3# 3x10 3#   3x10 4# 3x10 2# 2x10 2# 2x10 2# 2x10   LAQ 3# 3x10 3# 3x10 P1 2x10  3# 3x10 ea 3# 3x10ea 3# 3x10ea   SAQ 5# 3x10 5# 3x10 DC 3# 3x10 ea 3# 3x10ea 3# 3x10ea   HC HS curl machine P4 2x10 HS Curl P4 2x10 SL P2 3x10 ea L3 2x10 ea L3 2x10ea L3 2x10ea   Standing: Hip FLX 3# 3x10 3# 3x10 4# 3x10      Standing: Hip ABD 3# 3x10 3# 3x10 4# 3x10 2# 2x10 ea 2# 2x10 2# 2x10   Standing: Hip Ext    0# 10x       Seated FLX c/ SB: 3 way 10x ea way 10x ea way 10x ea way 10x ea way 10x ea way 10x ea way   Hip Add w/Ball  5"x15 DC  5"x15 5"x15   Leg Press: S3 P1 2x10  P1 2x10 P1 2x10  P1 x10 P1 x10            Ther Activity          Stair Negotiation          Obstacle Course                                      Gait Training          Ambulation c/ SPC                     Modalities           MHP MHP wth supine TE MHP w/ supine TE  MHP c/ supine TE   MHP c/ supine

## 2022-12-01 ENCOUNTER — OFFICE VISIT (OUTPATIENT)
Dept: PHYSICAL THERAPY | Facility: CLINIC | Age: 57
End: 2022-12-01

## 2022-12-01 DIAGNOSIS — R26.2 AMBULATORY DYSFUNCTION: Primary | ICD-10-CM

## 2022-12-01 NOTE — PROGRESS NOTES
Daily Note     Today's date: 2022  Patient name: Michell Agarwal  : 1965  MRN: [id number]  Referring provider: Latisha Grimes DO  Dx:   Encounter Diagnosis     ICD-10-CM    1  Ambulatory dysfunction  R26 2           Start Time: 0800  Stop Time: 0907  Total time in clinic (min): 67 minutes    Subjective: Was fatigued following progressions at last visit, but is pleased c/ progressions  Feels the new addition of machines will be helpful for him  Objective: See treatment diary below      Assessment: Tolerated treatment well  Patient demonstrated fatigue post treatment, exhibited good technique with therapeutic exercises and would benefit from continued PT  Required frequent to occasional finger touching on pbars for stability during SLS  Required more support on left than right  Tandem walking improved performance this visit c/ only occasional reaching for UE support  Plan: Continue per plan of care        Precautions: FALL RISK, Use cushion on chairs for coccyx, Hx of Seizures, 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    Manuals    HS/calves  CM KS CM KS  JM                           Neuro Re-Ed           NBOS           SLS     firm 30"x3 ea Firm 30"x4     Tandem Walking  5x // c/ 3# 5x// 3# 3" holds 3x//    3" holds 3x// 5x // c/ 2# 5x // c/ 2#   Side Stepping L3 5x// L3 5x // L3 5x// L3 5x // L2 20x L2 20x   Hurdles: Fwd & Lat High 3# 10x ea High 3# 10x ea DC  High 2# 10x ea High 2# 10x ea   Step-ups: Fwd 8" 3# 2x10  8" 3# 15x ea 8" 0# 25x ea nv 8" 2# 15x 8" 2# 15x   Step-ups: Lat          Sit to Stands 1x10 1x10 1x15 1x15 1x10 1x10             Ther Ex          NuStep L6 10' L6 10' L6 10' L6 10' L6 10' L6 10'   SLR 3# 2x10 ea 3# 2x10ea 3# 2x10 ea 3# 2x10ea 2# 2x10ea 3# 2x10ea   Clamshells  L5 3x10ea DC   L3 3x10ea L3 3x10ea   Standing: HR 3# 3x10 3# 3x10 4# 3x10 4# 3x10 2# 2x10 2# 2x10   Seated: TR 3# 3x10 3#   3x10 4# 3x10 4# 3x10 2# 2x10 2# 2x10   LAQ 3# 3x10 3# 3x10 P1 2x10  P1 2x10  3# 3x10ea 3# 3x10ea   SAQ 5# 3x10 5# 3x10 DC 7# 3x10 3# 3x10ea 3# 3x10ea   HC HS curl machine P4 2x10 HS Curl P4 2x10 SL P2 3x10 ea SL P2 3x10 ea L3 2x10ea L3 2x10ea   Standing: Hip FLX 3# 3x10 3# 3x10 4# 3x10 4# 3x10     Standing: Hip ABD 3# 3x10 3# 3x10 4# 3x10 4# 3x10 2# 2x10 2# 2x10   Standing: Hip Ext    0# 10x  0# 2x10     Seated FLX c/ SB: 3 way 10x ea way 10x ea way 10x ea way 10x ea way 10x ea way 10x ea way   Hip Add w/Ball  5"x15 DC  5"x15 5"x15   Leg Press: S3 P1 2x10  P1 2x10 P1 2x10 P1 3x10 P1 x10 P1 x10            Ther Activity          Stair Negotiation          Obstacle Course                                      Gait Training          Ambulation c/ SPC                     Modalities           MHP MHP wth supine TE MHP w/ supine TE  MHP c/ supine TE MHP c/ supine TE  MHP c/ supine                    99.5

## 2022-12-05 ENCOUNTER — APPOINTMENT (OUTPATIENT)
Dept: PHYSICAL THERAPY | Facility: CLINIC | Age: 57
End: 2022-12-05

## 2022-12-08 ENCOUNTER — OFFICE VISIT (OUTPATIENT)
Dept: PHYSICAL THERAPY | Facility: CLINIC | Age: 57
End: 2022-12-08

## 2022-12-08 DIAGNOSIS — R26.2 AMBULATORY DYSFUNCTION: Primary | ICD-10-CM

## 2022-12-08 NOTE — PROGRESS NOTES
PT Re-Evaluation     Today's date: 2022  Patient name: Alia Pratt  : 1965  MRN: [id number]  Referring provider: Sonny Eisenmenger, DO  Dx:   Encounter Diagnosis     ICD-10-CM    1  Ambulatory dysfunction  R26 2           Start Time: 0800  Stop Time: 0900  Total time in clinic (min): 60 minutes    Assessment  Assessment details: Patient is a 64 yo male with medical diagnosis of gait dysfunction  Improved gait mechanics, using NBQC for all ambulation  Denies any falls since prior to beginning PT  Functional testing improving consistently  Five time sit to stand improved by Marilee Beck 112 and he is now below the 13 6" threshold indicating increased fall risk  TUG test improved by 4", continues to be below the goal for patients of similar age, but consistently progressing  Strength continues to improve, but is most limited on the left side  He is progressing towards meeting LTGs, but they are not fully met at this time  Patient will continue to benefit from skilled PT interventions to address remaining impairments and functional limitations and decrease risk of falls  Impairments: abnormal gait, abnormal or restricted ROM, abnormal movement, activity intolerance, impaired balance, impaired physical strength, pain with function and safety issue    Goals  STG (3 weeks): Improve Five Time Sit to Stand by  2 3"  Met  Increase LE strength by 1/2 grade  Met  LTG (6 weeks):  TUG <13 5"  Met  Five Time Sit to Stand <13 6"  Met   LE Strength >=4+/5  Progressing, not met  Patient will be independent with HEP in 6 weeks to allow independent management of condition  Progressing, not met  Plan  Plan details: TE, NMR, TA, MT, self-care, and modalities as needed in order to progress through skilled PT focused on ROM, strength, balance, motor control     Patient would benefit from: skilled physical therapy  Planned modality interventions: cryotherapy and thermotherapy: hydrocollator packs  Planned therapy interventions: manual therapy, neuromuscular re-education, patient education, self care, therapeutic activities, therapeutic exercise, home exercise program and gait training  Frequency: 2x week  Duration in weeks: 6  Treatment plan discussed with: patient        Subjective Evaluation    History of Present Illness  Mechanism of injury: Patient is a 64 yo male presenting with balance and gait dysfunction  Patient was coming to [facility] prior to being hospitalized for a fall in 2022  Was hospitalized for several weeks and admitted to a nursing home for PT  Followed up with his family doctor after SNF and prescribed PT  Most recent fall was about 3 weeks ago, reports he "only bumped my eye " Uses a RW for all ambulation at home and in the community  Rarely, "takes very short trips without it (his RW), but theres always something to grab onto if I do this " PMHx is (+) for seizure disorder and history of back fractures  Patients primary goals for PT are to increase strength, improve balance, improve gait and decrease risk of falls  UPDATE : Patient presents to PT using NBQC for ambulation, but reports he doesn't use an AD most of the time at home  Has noticed significant progress in his strength and function since beginning PT  UPDATE : Strength continues to improve  Denies falls  Left leg weaker compared to right     Pain  Current pain ratin  At worst pain rating: 10  Location: low back pain    Social Support  Steps to enter house: yes (7 TIFF c/ u/l right ascending railing)  Stairs in house: no   Lives in: [facility] (walk-in shower and shower chair)    Treatments  Previous treatment: physical therapy  Patient Goals  Patient goals for therapy: improved balance, increased strength, return to sport/leisure activities and independence with ADLs/IADLs          Objective   Gait: NBQC, weakness when turning to right, weakness in left legs results in difficulty bringing his LE around as he turns   Transfers:     -Sit to Stand: Ind no use of UE     -Sit <> Supine: Ind         TU" s/ AD and no UE use to stand  Five Time Sit to Stand: 11" no UE use     MMT (R/L):  Hip FLX: 4/4-  Hip ABD: 4/4-  Knee EXT: 4-/4-  Knee FLX: 4-/3+  Ankle DF: 4- /4-           Precautions: FALL RISK, Use cushion on chairs for coccyx, Hx of Seizures, 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    Manuals    HS/calves  CM KS CM KS KS JM                           Neuro Re-Ed           NBOS           SLS     firm 30"x3 ea Firm 30"x4 Firm 30"x4    Tandem Walking  5x // c/ 3# 5x// 3# 3" holds 3x//    3" holds 3x// 3" holds 3x// 5x // c/ 2#   Side Stepping L3 5x// L3 5x // L3 5x// L3 5x // L3 5x // L2 20x   Hurdles: Fwd & Lat High 3# 10x ea High 3# 10x ea DC   High 2# 10x ea   Step-ups: Fwd 8" 3# 2x10  8" 3# 15x ea 8" 0# 25x ea nv 8" 0# 25x ea 8" 2# 15x   Step-ups: Lat          Sit to Stands 1x10 1x10 1x15 1x15 1x15 1x10             Ther Ex          NuStep L6 10' L6 10' L6 10' L6 10' L6 10' Musc End L6 10'   SLR 3# 2x10 ea 3# 2x10ea 3# 2x10 ea 3# 2x10ea 3# 2x10ea 3# 2x10ea   Clamshells  L5 3x10ea DC    L3 3x10ea   Standing: HR 3# 3x10 3# 3x10 4# 3x10 4# 3x10 4# 3x10 2# 2x10   Seated: TR 3# 3x10 3#   3x10 4# 3x10 4# 3x10 4# 3x10 2# 2x10   LAQ 3# 3x10 3# 3x10 P1 2x10  P1 2x10  P2 3x10 3# 3x10ea   SAQ 5# 3x10 5# 3x10 DC 7# 3x10 np 3# 3x10ea   HC HS curl machine P4 2x10 HS Curl P4 2x10 SL P2 3x10 ea SL P2 3x10 ea SL P4 3x10ea L3 2x10ea   Standing: Hip FLX 3# 3x10 3# 3x10 4# 3x10 4# 3x10 4# 3x10    Standing: Hip ABD 3# 3x10 3# 3x10 4# 3x10 4# 3x10 4# 3x10 2# 2x10   Standing: Hip Ext    0# 10x  0# 2x10 4# 3x10    Seated FLX c/ SB: 3 way 10x ea way 10x ea way 10x ea way 10x ea way 10x ea way 10x ea way   Hip Add w/Ball  5"x15 DC   5"x15   Leg Press: S3 P1 2x10  P1 2x10 P1 2x10 P1 3x10 P3x10 P1 x10            Ther Activity          Stair Negotiation          Obstacle Course                                      Gait Training          Ambulation c/ SPC                     Modalities           MHP MHP wth supine TE MHP w/ supine TE  MHP c/ supine TE MHP c/ supine TE MHP c/ supine TE MHP c/ supine

## 2022-12-12 ENCOUNTER — OFFICE VISIT (OUTPATIENT)
Dept: PHYSICAL THERAPY | Facility: CLINIC | Age: 57
End: 2022-12-12

## 2022-12-12 DIAGNOSIS — E55.9 VITAMIN D DEFICIENCY: ICD-10-CM

## 2022-12-12 DIAGNOSIS — R26.2 AMBULATORY DYSFUNCTION: Primary | ICD-10-CM

## 2022-12-12 RX ORDER — ERGOCALCIFEROL 1.25 MG/1
50000 CAPSULE ORAL WEEKLY
Qty: 13 CAPSULE | Refills: 1 | Status: SHIPPED | OUTPATIENT
Start: 2022-12-12

## 2022-12-12 NOTE — TELEPHONE ENCOUNTER
Yes, hi, my name is Nona Joyce is your birth is [birth year]  I'm calling about having a refill done on my vitamin D pills is amazing at [phone]   Thank you very much

## 2022-12-12 NOTE — PROGRESS NOTES
Daily Note     Today's date: 2022  Patient name: Antonio Roth  : 1965  MRN: [id number]  Referring provider: Fifi House DO  Dx:   Encounter Diagnosis     ICD-10-CM    1  Ambulatory dysfunction  R26 2                      Subjective: Patient reports that his L knee buckled on him over the weekend and he hit it off a corner of a table, he did not fall  No injury just soreness  Objective: See treatment diary below  Increased resistance on side stepping today  Assessment: Tolerated treatment well  Patient demonstrated fatigue post treatment and would benefit from continued PT to improve B LE strength for endurance and stability and reduce the risk of falls  Did well with progression today  HS mobility improving  Plan: Continue per plan of care        Precautions: FALL RISK, Use cushion on chairs for coccyx, Hx of Seizures, 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    Manuals    HS/calves  CM KS CM KS KS                            Neuro Re-Ed           NBOS           SLS     firm 30"x3 ea Firm 30"x4 Firm 30"x4 Firm 30"x4   Tandem Walking  5x // c/ 3# 5x// 3# 3" holds 3x//    3" holds 3x// 3" holds 3x// 3" holds 3x//   Side Stepping L3 5x// L3 5x // L3 5x// L3 5x // L3 5x // L3 5x//   Hurdles: Fwd & Lat High 3# 10x ea High 3# 10x ea DC      Step-ups: Fwd 8" 3# 2x10  8" 3# 15x ea 8" 0# 25x ea nv 8" 0# 25x ea 8" 25x ea   Step-ups: Lat          Sit to Stands 1x10 1x10 1x15 1x15 1x15              Ther Ex          NuStep L6 10' L6 10' L6 10' L6 10' L6 10' Musc End L6 10'   SLR 3# 2x10 ea 3# 2x10ea 3# 2x10 ea 3# 2x10ea 3# 2x10ea 3# 2x10 ea   Clamshells  L5 3x10ea DC       Standing: HR 3# 3x10 3# 3x10 4# 3x10 4# 3x10 4# 3x10 4# 3x10   Seated: TR 3# 3x10 3#   3x10 4# 3x10 4# 3x10 4# 3x10 4# 3x10   LAQ 3# 3x10 3# 3x10 P1 2x10  P1 2x10  P2 3x10 P2 3x10   SAQ 5# 3x10 5# 3x10 DC 7# 3x10 np DC   HC HS curl machine P4 2x10 HS Curl P4 2x10 SL P2 3x10 ea SL P2 3x10 ea SL P4 3x10ea P4 3x10   Standing: Hip FLX 3# 3x10 3# 3x10 4# 3x10 4# 3x10 4# 3x10 4# 3x10   Standing: Hip ABD 3# 3x10 3# 3x10 4# 3x10 4# 3x10 4# 3x10 4# 3x10   Standing: Hip Ext    0# 10x  0# 2x10 4# 3x10 4# 3x10   Seated FLX c/ SB: 3 way 10x ea way 10x ea way 10x ea way 10x ea way 10x ea way 10 x ea way   Hip Add w/Ball  5"x15 DC      Leg Press: S3 P1 2x10  P1 2x10 P1 2x10 P1 3x10 P3x10 P2 4x10            Ther Activity          Stair Negotiation          Obstacle Course                                      Gait Training          Ambulation c/ SPC                     Modalities           MHP MHP wth supine TE MHP w/ supine TE  MHP c/ supine TE MHP c/ supine TE MHP c/ supine TE MHP c/ supine TE

## 2022-12-13 ENCOUNTER — OFFICE VISIT (OUTPATIENT)
Dept: OBGYN CLINIC | Facility: CLINIC | Age: 57
End: 2022-12-13

## 2022-12-13 VITALS
WEIGHT: 114 LBS | BODY MASS INDEX: 20.2 KG/M2 | HEIGHT: 63 IN | SYSTOLIC BLOOD PRESSURE: 116 MMHG | DIASTOLIC BLOOD PRESSURE: 78 MMHG

## 2022-12-13 DIAGNOSIS — Z96.9 RETAINED ORTHOPEDIC HARDWARE: Primary | ICD-10-CM

## 2022-12-13 DIAGNOSIS — M25.511 RIGHT SHOULDER PAIN, UNSPECIFIED CHRONICITY: ICD-10-CM

## 2022-12-13 DIAGNOSIS — M62.511 ATROPHY OF MUSCLE OF RIGHT SHOULDER: ICD-10-CM

## 2022-12-13 NOTE — PROGRESS NOTES
65 y o male presents in consultation from Dr Christine Suero regarding of right shoulder pain  He is right-hand dominant  He had a fall years ago and had surgery at [facility] on his neck and proximal humerus fracture  He has a current proximal oral plate and screws well-healed fracture no shoulder dislocation by x-ray reading  Was a former drinker  He no longer drinks  He states that approximately 4 to 5 weeks ago to click and pain in his shoulder  He saw his PCP Dr Christine Suero who was unsure if the shoulder was dislocated  There was supposedly some type of manipulation with a click  The patient then had x-rays taken after discussing with Dr Simons Felt x-rays were read as no dislocation  She states that shoulder felt somewhat better and then this morning while moving the shoulder he got a painful click thinks he may have dislocated his shoulder again  Denies numbness or tingling  Denies any gross loss of motion of the shoulder  Review of Systems  Review of systems negative unless otherwise specified in HPI    Past Medical History  Past Medical History:   Diagnosis Date   • Alcohol abuse    • Traore esophagus    • Bowel obstruction (HCC)    • Bowel perforation ([facility] [address] )    • Cardiac disease    • Continuous chronic alcoholism ([facility] [address] ) 10/5/2017   • COPD (chronic obstructive pulmonary disease) ([facility])    • History of shoulder surgery     Right shoulder   • History of transfusion    • Hx of cervical spine surgery    • Hypertension    • Incisional hernia 10/8/2017   • MI, old    • Mitral regurgitation    • Psychiatric disorder    • Seizures [facility])      Past Surgical History  Past Surgical History:   Procedure Laterality Date   • APPENDECTOMY     • BACK SURGERY     • CHOLECYSTECTOMY     • ESOPHAGOGASTRODUODENOSCOPY N/A 11/28/2016    Procedure: ESOPHAGOGASTRODUODENOSCOPY (EGD); Surgeon: Vito Duncan MD;  Location: [facility];   Service:    • GALLBLADDER SURGERY     • LAPAROTOMY N/A 10/25/2016    Procedure: Sergio Bueno EXPLORATORY;  Surgeon: Rosalia Rodarte MD;  Location: [facility];  Service:    • MOUTH SURGERY     • PERCUTANEOUS PINNING FEMORAL NECK FRACTURE     • SHOULDER SURGERY Right    • SHOULDER SURGERY     • SMALL INTESTINE SURGERY     • STOMACH SURGERY      bal surgery     Current Medications  Current Outpatient Medications on File Prior to Visit   Medication Sig Dispense Refill   • albuterol (2 5 mg/3 mL) 0 083 % nebulizer solution Take 2 5 mg by nebulization every 6 (six) hours as needed for wheezing or shortness of breath     • albuterol (ACCUNEB) 0 63 MG/3ML nebulizer solution      • albuterol (PROVENTIL HFA,VENTOLIN HFA) 90 mcg/act inhaler Inhale 2 puffs every 4 (four) hours as needed for wheezing or shortness of breath 18 g 2   • bumetanide (BUMEX) 1 mg tablet Take 1 tablet (1 mg total) by mouth daily 30 tablet 2   • Cholecalciferol 25 MCG (1000 UT) tablet Take 1 tablet (1,000 Units total) by mouth daily 30 tablet 0   • cyanocobalamin (VITAMIN B-12) 100 mcg tablet Take 1 tablet (100 mcg total) by mouth daily 30 tablet 5   • dextromethorphan-guaifenesin (MUCINEX DM)  MG per 12 hr tablet Take 1 tablet by mouth every 12 (twelve) hours 14 tablet 0   • Diclofenac Sodium (VOLTAREN) 1 % Apply 2 g topically 4 (four) times a day 100 g 1   • diphenhydrAMINE (SOMINEX) 25 MG tablet Take 25 mg by mouth     • docusate sodium (COLACE) 100 mg capsule Take 1 capsule (100 mg total) by mouth 2 (two) times a day as needed for constipation 30 capsule 0   • ergocalciferol (VITAMIN D2) 50,000 units Take 1 capsule (50,000 Units total) by mouth once a week 13 capsule 1   • esomeprazole (NexIUM) 40 MG capsule Take 1 capsule (40 mg total) by mouth 2 (two) times a day before meals 60 capsule 3   • folic acid (FOLVITE) 1 mg tablet Take 1 tablet (1 mg total) by mouth daily 30 tablet 0   • hydrocortisone (CORTEF) 10 mg tablet Take 5 tablets (50 mg total) by mouth 2 (two) times a day 60 tablet 0   • hydrOXYzine HCL (ATARAX) 25 mg tablet Take 1 tablet (25 mg total) by mouth every 6 (six) hours as needed for itching, allergies or anxiety 90 tablet 0   • lactulose (CHRONULAC) 10 g/15 mL solution      • levETIRAcetam (KEPPRA) 1000 MG tablet Take 1 tablet (1,000 mg total) by mouth every 12 (twelve) hours 60 tablet 2   • lisinopril (ZESTRIL) 10 mg tablet Take 1 tablet (10 mg total) by mouth daily 30 tablet 5   • loratadine (Claritin Reditabs) 10 MG dissolvable tablet Take 10 mg by mouth daily     • LORazepam (ATIVAN) 0 5 mg tablet Take 1 tablet (0 5 mg total) by mouth daily at bedtime 30 tablet 0   • Multiple Vitamin (TAB-A-BONI PO) Take 1 tablet by mouth daily     • multivitamin (THERAGRAN) TABS Take 1 tablet by mouth daily     • oxyCODONE-acetaminophen (Percocet) 5-325 mg per tablet Take 1 tablet by mouth every 8 (eight) hours as needed for severe pain Max Daily Amount: 3 tablets 9 tablet 0   • pancrelipase, Lip-Prot-Amyl, (CREON) 6,000 units delayed release capsule Take 6,000 units of lipase by mouth 3 (three) times a day with meals 90 capsule 2   • polyethylene glycol (GOLYTELY) 4000 mL solution Take 4,000 mL by mouth once for 1 dose (Patient not taking: No sig reported) 4000 mL 0   • potassium chloride (K-DUR,KLOR-CON) 20 mEq tablet Take 1 tablet (20 mEq total) by mouth daily for 1 dose (Patient not taking: No sig reported) 2 tablet 0   • spironolactone (ALDACTONE) 100 mg tablet Take 1 tablet (100 mg total) by mouth daily 90 tablet 3   • thiamine 100 MG tablet Take 1 tablet (100 mg total) by mouth daily 30 tablet 0     No current facility-administered medications on file prior to visit       Recent Labs [facility])  0   Lab Value Date/Time    HCT 36 1 (L) 09/15/2022 0749    HCT 32 4 (L) 12/31/2015 0430    HGB 12 2 09/15/2022 0749    HGB 11 1 (L) 12/31/2015 0430    WBC 6 42 09/15/2022 0749    WBC 7 70 12/31/2015 0430    INR 1 14 05/21/2022 0622    INR 1 03 12/30/2015 2106    CRP 6 1 (H) 05/23/2022 0507    GLUCOSE 149 (H) 07/28/2017 2014    GLUCOSE 88 12/31/2015 0430    HGBA1C 5 0 05/21/2022 0622    HGBA1C 5 2 10/26/2015 0525     Physical exam  Body mass index is 20 19 kg/m²  · General: Awake, Alert, Oriented, thin statured and cachectic  · Eyes: Pupils equal, round and reactive to light  · Heart: regular rate and rhythm  · Lungs: No audible wheezing  · Abdomen: soft  right Shoulder atrophy about the shoulders  Patient has good active and passive range of motion of the shoulder without significant pain  There is pain with palpation over the anterior aspect of the shoulder where the plate is prominent  No numbness or tingling  There is no signs of infection  Note is that the patient's thoracic spine hardware is prominent causing skin tenting that the thinness of the patient's body habitus  Pulses intact  Range of motion of the elbow  No signs of shoulder dislocation  By palpation numerous is well located in the glenoid  No weakness of the spinatus with empty can testing  Procedure  None today    Imaging  11/18/22 x-rays right shoulder reviewed agree with radiologist reading of no fracture or dislocation  There is well-healed fracture with hardware maintained  There is no significant arthritis of the glenohumeral joint  Of note, incidental finding is of the screw in the patient's back is broken  1  Retained orthopedic hardware    2  Right shoulder pain, unspecified chronicity    3  Atrophy of muscle of right shoulder      Assessment:  right shoulder pain and hardware which seems symptomatic to some degree along with some atrophy and some weakness  Plan: The patient started in physical therapy for his right shoulder  He already attends therapy for his leg  Reviewed with the patient that shoulder is not dislocated x-ray or by his physical exam today  Work on strengthening of the shoulder and rehab  If there is no improvement we will consider other options 3 months when he is seen for follow-up    Also reviewed with the patient that he may have some continual clicking of the shoulder does not the shoulder is dislocated  No improvement in 3 months may consider advanced imaging  This note was created with voice recognition software

## 2022-12-13 NOTE — LETTER
December 13, 2022     [id number]    Patient: Rachel Buckley   YOB: 1965   Date of Visit: 12/13/2022       Dear Dr Jenifer Wood:    Thank you for referring Cooper Antoine to me for evaluation  Below are my notes for this consultation  If you have questions, please do not hesitate to call me  I look forward to following your patient along with you  Sincerely,        Alysa Eddy DO        CC: No Recipients  Alysa Eddy DO  12/13/2022 10:11 AM  Signed  65 y o male presents in consultation from Dr Jenifer Wood regarding of right shoulder pain  He is right-hand dominant  He had a fall years ago and had surgery at [facility] on his neck and proximal humerus fracture  He has a current proximal oral plate and screws well-healed fracture no shoulder dislocation by x-ray reading  Was a former drinker  He no longer drinks  He states that approximately 4 to 5 weeks ago to click and pain in his shoulder  He saw his PCP Dr Jenifer Wood who was unsure if the shoulder was dislocated  There was supposedly some type of manipulation with a click  The patient then had x-rays taken after discussing with Dr Ayanna Mendez x-rays were read as no dislocation  She states that shoulder felt somewhat better and then this morning while moving the shoulder he got a painful click thinks he may have dislocated his shoulder again  Denies numbness or tingling  Denies any gross loss of motion of the shoulder      Review of Systems  Review of systems negative unless otherwise specified in HPI    Past Medical History  Past Medical History:   Diagnosis Date   • Alcohol abuse    • Traore esophagus    • Bowel obstruction ([facility])    • Bowel perforation ([facility] [address] )    • Cardiac disease    • Continuous chronic alcoholism ([facility] [address] ) 10/5/2017   • COPD (chronic obstructive pulmonary disease) ([facility])    • History of shoulder surgery     Right shoulder   • History of transfusion    • Hx of cervical spine surgery    • Hypertension    • Incisional hernia 10/8/2017   • MI, old    • Mitral regurgitation    • Psychiatric disorder    • Seizures [facility])      Past Surgical History  Past Surgical History:   Procedure Laterality Date   • APPENDECTOMY     • BACK SURGERY     • CHOLECYSTECTOMY     • ESOPHAGOGASTRODUODENOSCOPY N/A 11/28/2016    Procedure: ESOPHAGOGASTRODUODENOSCOPY (EGD); Surgeon: Yohan Gomez MD;  Location:  GI LAB;   Service:    • GALLBLADDER SURGERY     • LAPAROTOMY N/A 10/25/2016    Procedure: LAPAROTOMY EXPLORATORY;  Surgeon: Margaret Oliver MD;  Location: [facility];  Service:    • MOUTH SURGERY     • PERCUTANEOUS PINNING FEMORAL NECK FRACTURE     • SHOULDER SURGERY Right    • SHOULDER SURGERY     • SMALL INTESTINE SURGERY     • STOMACH SURGERY      bal surgery     Current Medications  Current Outpatient Medications on File Prior to Visit   Medication Sig Dispense Refill   • albuterol (2 5 mg/3 mL) 0 083 % nebulizer solution Take 2 5 mg by nebulization every 6 (six) hours as needed for wheezing or shortness of breath     • albuterol (ACCUNEB) 0 63 MG/3ML nebulizer solution      • albuterol (PROVENTIL HFA,VENTOLIN HFA) 90 mcg/act inhaler Inhale 2 puffs every 4 (four) hours as needed for wheezing or shortness of breath 18 g 2   • bumetanide (BUMEX) 1 mg tablet Take 1 tablet (1 mg total) by mouth daily 30 tablet 2   • Cholecalciferol 25 MCG (1000 UT) tablet Take 1 tablet (1,000 Units total) by mouth daily 30 tablet 0   • cyanocobalamin (VITAMIN B-12) 100 mcg tablet Take 1 tablet (100 mcg total) by mouth daily 30 tablet 5   • dextromethorphan-guaifenesin (MUCINEX DM)  MG per 12 hr tablet Take 1 tablet by mouth every 12 (twelve) hours 14 tablet 0   • Diclofenac Sodium (VOLTAREN) 1 % Apply 2 g topically 4 (four) times a day 100 g 1   • diphenhydrAMINE (SOMINEX) 25 MG tablet Take 25 mg by mouth     • docusate sodium (COLACE) 100 mg capsule Take 1 capsule (100 mg total) by mouth 2 (two) times a day as needed for constipation 30 capsule 0   • ergocalciferol (VITAMIN D2) 50,000 units Take 1 capsule (50,000 Units total) by mouth once a week 13 capsule 1   • esomeprazole (NexIUM) 40 MG capsule Take 1 capsule (40 mg total) by mouth 2 (two) times a day before meals 60 capsule 3   • folic acid (FOLVITE) 1 mg tablet Take 1 tablet (1 mg total) by mouth daily 30 tablet 0   • hydrocortisone (CORTEF) 10 mg tablet Take 5 tablets (50 mg total) by mouth 2 (two) times a day 60 tablet 0   • hydrOXYzine HCL (ATARAX) 25 mg tablet Take 1 tablet (25 mg total) by mouth every 6 (six) hours as needed for itching, allergies or anxiety 90 tablet 0   • lactulose (CHRONULAC) 10 g/15 mL solution      • levETIRAcetam (KEPPRA) 1000 MG tablet Take 1 tablet (1,000 mg total) by mouth every 12 (twelve) hours 60 tablet 2   • lisinopril (ZESTRIL) 10 mg tablet Take 1 tablet (10 mg total) by mouth daily 30 tablet 5   • loratadine (Claritin Reditabs) 10 MG dissolvable tablet Take 10 mg by mouth daily     • LORazepam (ATIVAN) 0 5 mg tablet Take 1 tablet (0 5 mg total) by mouth daily at bedtime 30 tablet 0   • Multiple Vitamin (TAB-A-BONI PO) Take 1 tablet by mouth daily     • multivitamin (THERAGRAN) TABS Take 1 tablet by mouth daily     • oxyCODONE-acetaminophen (Percocet) 5-325 mg per tablet Take 1 tablet by mouth every 8 (eight) hours as needed for severe pain Max Daily Amount: 3 tablets 9 tablet 0   • pancrelipase, Lip-Prot-Amyl, (CREON) 6,000 units delayed release capsule Take 6,000 units of lipase by mouth 3 (three) times a day with meals 90 capsule 2   • polyethylene glycol (GOLYTELY) 4000 mL solution Take 4,000 mL by mouth once for 1 dose (Patient not taking: No sig reported) 4000 mL 0   • potassium chloride (K-DUR,KLOR-CON) 20 mEq tablet Take 1 tablet (20 mEq total) by mouth daily for 1 dose (Patient not taking: No sig reported) 2 tablet 0   • spironolactone (ALDACTONE) 100 mg tablet Take 1 tablet (100 mg total) by mouth daily 90 tablet 3   • thiamine 100 MG tablet Take 1 tablet (100 mg total) by mouth daily 30 tablet 0     No current facility-administered medications on file prior to visit  Recent Labs [facility])  0   Lab Value Date/Time    HCT 36 1 (L) 09/15/2022 0749    HCT 32 4 (L) 12/31/2015 0430    HGB 12 2 09/15/2022 0749    HGB 11 1 (L) 12/31/2015 0430    WBC 6 42 09/15/2022 0749    WBC 7 70 12/31/2015 0430    INR 1 14 05/21/2022 0622    INR 1 03 12/30/2015 2106    CRP 6 1 (H) 05/23/2022 0507    GLUCOSE 149 (H) 07/28/2017 2014    GLUCOSE 88 12/31/2015 0430    HGBA1C 5 0 05/21/2022 0622    HGBA1C 5 2 10/26/2015 0525     Physical exam  Body mass index is 20 19 kg/m²  · General: Awake, Alert, Oriented, thin statured and cachectic  · Eyes: Pupils equal, round and reactive to light  · Heart: regular rate and rhythm  · Lungs: No audible wheezing  · Abdomen: soft  right Shoulder atrophy about the shoulders  Patient has good active and passive range of motion of the shoulder without significant pain  There is pain with palpation over the anterior aspect of the shoulder where the plate is prominent  No numbness or tingling  There is no signs of infection  Note is that the patient's thoracic spine hardware is prominent causing skin tenting that the thinness of the patient's body habitus  Pulses intact  Range of motion of the elbow  No signs of shoulder dislocation  By palpation numerous is well located in the glenoid  No weakness of the spinatus with empty can testing  Procedure  None today    Imaging  11/18/22 x-rays right shoulder reviewed agree with radiologist reading of no fracture or dislocation  There is well-healed fracture with hardware maintained  There is no significant arthritis of the glenohumeral joint  Of note, incidental finding is of the screw in the patient's back is broken  1  Retained orthopedic hardware    2  Right shoulder pain, unspecified chronicity    3   Atrophy of muscle of right shoulder      Assessment:  right shoulder pain and hardware which seems symptomatic to some degree along with some atrophy and some weakness  Plan: The patient started in physical therapy for his right shoulder  He already attends therapy for his leg  Reviewed with the patient that shoulder is not dislocated x-ray or by his physical exam today  Work on strengthening of the shoulder and rehab  If there is no improvement we will consider other options 3 months when he is seen for follow-up  Also reviewed with the patient that he may have some continual clicking of the shoulder does not the shoulder is dislocated  No improvement in 3 months may consider advanced imaging  This note was created with voice recognition software

## 2022-12-13 NOTE — PATIENT INSTRUCTIONS
The patient started in physical therapy for his right shoulder  He already attends therapy for his leg  Reviewed with the patient that shoulder is not dislocated x-ray or by his physical exam today  Work on strengthening of the shoulder and rehab  If there is no improvement we will consider other options 3 months when he is seen for follow-up  Also reviewed with the patient that he may have some continual clicking of the shoulder does not the shoulder is dislocated  No improvement in 3 months may consider advanced imaging

## 2022-12-14 DIAGNOSIS — G47.00 INSOMNIA, UNSPECIFIED TYPE: ICD-10-CM

## 2022-12-15 ENCOUNTER — OFFICE VISIT (OUTPATIENT)
Dept: PHYSICAL THERAPY | Facility: CLINIC | Age: 57
End: 2022-12-15

## 2022-12-15 DIAGNOSIS — R26.2 AMBULATORY DYSFUNCTION: Primary | ICD-10-CM

## 2022-12-15 RX ORDER — LORAZEPAM 0.5 MG/1
0.5 TABLET ORAL
Qty: 30 TABLET | Refills: 0 | Status: SHIPPED | OUTPATIENT
Start: 2022-12-15

## 2022-12-15 NOTE — PROGRESS NOTES
Daily Note     Today's date: 12/15/2022  Patient name: Tank Sellers  : 1965  MRN: [id number]  Referring provider: Virginia Mai DO  Dx:   Encounter Diagnosis     ICD-10-CM    1  Ambulatory dysfunction  R26 2                      Subjective: Patient reports that his L LE still has weakness  Only issue he has at home is going up steps and getting up from the floor  MD script for R shoulder strengthening  Objective: See treatment diary below  Reviewed floor transfers  Will progress to more functional TE next session  Assessment: Tolerated treatment well  Patient would benefit from continued PT to improve B LE strength and stability  Patient's HS mobility is improving  His L LE still presents with weakness  Will incorporate floor transfers next session  Plan: Continue per plan of care        Precautions: FALL RISK, Use cushion on chairs for coccyx, Hx of Seizures, 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    Manuals 12/15        HS/calves                              Neuro Re-Ed         NBOS         SLS Firm 30"x4        Tandem Walking  3" holds 3x// DC too easy       Side Stepping L3 5x//        Floor transfers Reviewed for NV        Step-ups: Fwd 8" 4# 3x10 ea        Lunges NV                          Ther Ex         NuStep L6 10'        SLR 3# 3x10        Standing: HR 4# 3x10 DC       Seated: TR 4# 3x10 DC       LAQ P2 3x10        HC P4 3x10        Standing: Hip FLX 4# 3x10        Standing: Hip ABD 4# 3x10        Standing: Hip Ext 4# 3x10        Seated FLX c/ SB: 3 way 10x ea way        Leg Press: S3 P2 3x10                                              Modalities          MHP

## 2022-12-19 ENCOUNTER — APPOINTMENT (OUTPATIENT)
Dept: PHYSICAL THERAPY | Facility: CLINIC | Age: 57
End: 2022-12-19

## 2022-12-22 ENCOUNTER — APPOINTMENT (OUTPATIENT)
Dept: PHYSICAL THERAPY | Facility: CLINIC | Age: 57
End: 2022-12-22

## 2022-12-26 ENCOUNTER — APPOINTMENT (OUTPATIENT)
Dept: PHYSICAL THERAPY | Facility: CLINIC | Age: 57
End: 2022-12-26

## 2022-12-27 ENCOUNTER — OFFICE VISIT (OUTPATIENT)
Dept: PHYSICAL THERAPY | Facility: CLINIC | Age: 57
End: 2022-12-27

## 2022-12-27 DIAGNOSIS — R26.2 AMBULATORY DYSFUNCTION: Primary | ICD-10-CM

## 2022-12-27 NOTE — PROGRESS NOTES
Daily Note     Today's date: 2022  Patient name: Gaby Jimenez  : 1965  MRN: [id number]  Referring provider: Alvaro Sandhu DO  Dx:   Encounter Diagnosis     ICD-10-CM    1  Ambulatory dysfunction  R26 2                      Subjective: Patient reports that his L LE still has some weakness  His strength is improving  Objective: See treatment diary below  Incorporated lunges and floor transfers into program today  Assessment: Tolerated treatment well  Patient demonstrated fatigue post treatment and would benefit from continued PT to improve B Le strength and stability  Patient's main issue is to get himself off floor if he is working down low in the house  Will continue to work towards this to improve  Plan: Continue per plan of care        Precautions: FALL RISK, Use cushion on chairs for coccyx, Hx of Seizures, 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    Manuals 12/15 12/27       HS/calves  CM CM                           Neuro Re-Ed         NBOS         SLS Firm 30"x4 NV       Tandem Walking  3" holds 3x// DC too easy       Side Stepping L3 5x//        Floor transfers Reviewed for NV From kneeling to half kneel to stand 10x ea       Step-ups: Fwd 8" 4# 3x10 ea 8" 4# 3x10 ea       Lunges NV Reverse 10x ea                         Ther Ex         NuStep L6 10' L6 10'       SLR 3# 3x10 NV       Standing: HR 4# 3x10 DC       Seated: TR 4# 3x10 DC       LAQ P2 3x10 P2 3x10       HC P4 3x10 P4 3x10       Standing: Hip FLX 4# 3x10 4# 3x10 HEP      Standing: Hip ABD 4# 3x10 4# 3x10 HEP      Standing: Hip Ext 4# 3x10 4# 3x10 HEP      Seated FLX c/ SB: 3 way 10x ea way 10x ea way       Leg Press: S3 P2 3x10 P2 4x10                                             Modalities          MHP

## 2022-12-29 ENCOUNTER — EVALUATION (OUTPATIENT)
Dept: PHYSICAL THERAPY | Facility: CLINIC | Age: 57
End: 2022-12-29

## 2022-12-29 DIAGNOSIS — R26.2 AMBULATORY DYSFUNCTION: Primary | ICD-10-CM

## 2022-12-29 DIAGNOSIS — M62.511 ATROPHY OF MUSCLE OF RIGHT SHOULDER: ICD-10-CM

## 2022-12-29 NOTE — PROGRESS NOTES
Daily Note     Today's date: 2022  Patient name: Geetha Faith  : 1965  MRN: [id number]  Referring provider: Germain Valentine DO  Dx:   Encounter Diagnosis     ICD-10-CM    1  Ambulatory dysfunction  R26 2                      Subjective: Patient reports that his B LE had soreness after last session from progression  Objective: See treatment diary below  Assessment: Tolerated treatment well  Patient demonstrated fatigue post treatment and would benefit from continued PT to improve transfers from floor  Continue to progress as able and to tolerance  Will update HEP for B LE stretching  Plan: Continue per plan of care        Precautions: FALL RISK, Use cushion on chairs for coccyx, Hx of Seizures, 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    Manuals 12/15 12/27 12/29      HS/calves  CM CM Home                Assessment          Neuro Re-Ed         SLS Firm 30"x4 NV Firm 30"x3 ea      Tandem Walking  3" holds 3x// DC too easy       Side Stepping L3 5x//        Floor transfers Reviewed for NV From kneeling to half kneel to stand 10x ea From kneeling to half kneel to stand 10x ea      Step-ups: Fwd 8" 4# 3x10 ea 8" 4# 3x10 ea       Lunges NV Reverse 10x ea Fwd 10x ea                        Ther Ex         NuStep L6 10' L6 10' L6 10'      SLR 3# 3x10 NV       Standing: HR 4# 3x10 DC       Seated: TR 4# 3x10 DC       LAQ P2 3x10 P2 3x10 P2 3x10      HC P4 3x10 P4 3x10 P4 3x10      Standing: Hip FLX 4# 3x10 4# 3x10 HEP      Standing: Hip ABD 4# 3x10 4# 3x10 HEP      Standing: Hip Ext 4# 3x10 4# 3x10 HEP      Seated FLX c/ SB: 3 way 10x ea way 10x ea way 10x ea way DC     Leg Press: S3 P2 3x10 P2 4x10 P3 3x10                        Modalities          MHP   With TE

## 2022-12-29 NOTE — PROGRESS NOTES
PT Evaluation     Today's date: 2022  Patient name: Gaby Jimenez  : 1965  MRN: [id number]  Referring provider: Rustam Summers PA-C  Dx:   Encounter Diagnosis     ICD-10-CM    1  Atrophy of muscle of right shoulder  M62 511 Ambulatory Referral to Physical Therapy                     Assessment  Assessment details: Patient is a 66 yo male with medical diagnosis of atrophy of right shoulder musculature  Patient demonstrates significant limitation in ROM and strength compared to right sided  Pain likely a component in weakness and ROM deficits  Has a history of shoulder fracture, so shoulder ROM likely not exactly equal to L prior to recent exacerbation and injury  Patient responded well to initial exercises added this visit, will continue to progress exercises with specific focus on shoulder ROM and strength  Patient will benefit from skilled physical therapy treatment to address the aforementioned impairments and functional deficits, accomplish patient goals and return patient to OF  Impairments: abnormal or restricted ROM, activity intolerance, impaired physical strength, pain with function, scapular dyskinesis and poor posture     Goals  STG (3 weeks): Increase right shoulder ROM by 10 degrees elevation  Improve R SH strength by 1/2 grade  LTG (8 weeks):  R SH ROM WNL   >=4/5 R Shoulder Strength  75% improvement in right shoulder pain  Patient will be independent with HEP in 8 weeks to allow independent management of condition  Plan  Plan details: TE, NMR, TA, MT, self-care, and modalities as needed in order to progress through skilled PT focused on ROM, strength, posture, motor control     Patient would benefit from: skilled physical therapy  Planned modality interventions: cryotherapy and thermotherapy: hydrocollator packs  Planned therapy interventions: manual therapy, neuromuscular re-education, patient education, self care, therapeutic activities, therapeutic exercise and home exercise program  Frequency: 2x week  Duration in weeks: 8  Treatment plan discussed with: patient        Subjective Evaluation    History of Present Illness  Mechanism of injury: IE: Patient is a 64 yo male presenting with right shoulder pain  He has history of fall and fracture on this shoulder c/ proximal oral plate and screws, which are "well-healed " Per chart review of ortho note from 22  Symptoms began a few weeks ago, he felt a click and thought his shoulder was dislocated  Has had pain since, (-) for dislocation per ortho  Moving his shoulder in all directions is painful  Denies any falls on right shoulder  Patients primary goals for PT are to to decrease pain     Pain  Current pain ratin    Patient Goals  Patient goals for therapy: decreased pain, increased motion, increased strength and return to sport/leisure activities          Objective    Scapular Stability: Weakness, difficulty holding scap retraction >5"     Shoulder ROM (degrees):  FLX (R/L): 105 / 137  ABD (R/L): 92 / 100  ER (R/L): 61 / 50  IR (R/L): R PSIS / T10    Shoulder Strength (MMT Grades):  FLX (R/L): 3+ / 4  ABD (R/L): 3+ / 4  ER (R/L): 3+ / 4  IR (R/L): 3+ / 4    Shoulder Special Tests (R):  Denis: (+)               Precautions: FALL RISK      Date        Manuals        PROM                                Neuro Re-Ed        Ball Stability        Bilateral ER        Standing TB Row/Ext L2 2x10       Seated T-spine Ext        SA Punches Stick 2x10       Bent-Over: Row, Ext, HABD                Ther Ex        Stick: FLX & ER        Table Slides        Posterior Capsule Stretch nv       S/Lying ER/ABD nv       Standing TB ER/IR L1/L2 2x10       Pulleys        Wall Crawl                Ther Activity                        Modalities        CP

## 2023-01-03 ENCOUNTER — OFFICE VISIT (OUTPATIENT)
Dept: PHYSICAL THERAPY | Facility: CLINIC | Age: 58
End: 2023-01-03

## 2023-01-03 DIAGNOSIS — R26.2 AMBULATORY DYSFUNCTION: Primary | ICD-10-CM

## 2023-01-03 DIAGNOSIS — M62.511 ATROPHY OF MUSCLE OF RIGHT SHOULDER: Primary | ICD-10-CM

## 2023-01-03 NOTE — PROGRESS NOTES
Daily Note     Today's date: 1/3/2023  Patient name: Walt Wong  : 1965  MRN: [id number]  Referring provider: Yoselyn Diggs DO  Dx:   Encounter Diagnosis     ICD-10-CM    1  Ambulatory dysfunction  R26 2           Start Time: 810  Stop Time: 838  Total time in clinic (min): 28 minutes    Subjective: No significant changes reported since LV  Objective: See treatment diary below  Assessment: 30 min session focused on LE strengthening and balance  Good tolerance demonstrated  Good NM control with SLS with min LOB, but with contact guard maintained for safety  NV focused on floor transfers  Plan: Continue per plan of care        Precautions: FALL RISK, Use cushion on chairs for coccyx, Hx of Seizures, 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    Manuals 12/15 12/27 12/29 1/3     HS/calves  CM CM Home LQ               Assessment          Neuro Re-Ed         SLS Firm 30"x4 NV Firm 30"x3 ea Firm 30"x3 ea     Tandem Walking  3" holds 3x// DC too easy       Side Stepping L3 5x//        Floor transfers Reviewed for NV From kneeling to half kneel to stand 10x ea From kneeling to half kneel to stand 10x ea NV     Step-ups: Fwd 8" 4# 3x10 ea 8" 4# 3x10 ea       Lunges NV Reverse 10x ea Fwd 10x ea NV                       Ther Ex         NuStep L6 10' L6 10' L6 10' L6 10'     SLR 3# 3x10 NV  3# 3x10     Standing: HR 4# 3x10 DC       Seated: TR 4# 3x10 DC       LAQ P2 3x10 P2 3x10 P2 3x10 P2 3x10     HC P4 3x10 P4 3x10 P4 3x10 P4 3x10     Standing: Hip FLX 4# 3x10 4# 3x10 HEP      Standing: Hip ABD 4# 3x10 4# 3x10 HEP      Standing: Hip Ext 4# 3x10 4# 3x10 HEP      Seated FLX c/ SB: 3 way 10x ea way 10x ea way 10x ea way 10x ea way DC    Leg Press: S3 P2 3x10 P2 4x10 P3 3x10 P3 3x10                       Modalities          MHP   With TE With TE

## 2023-01-03 NOTE — PROGRESS NOTES
Daily Note     Today's date: 1/3/2023  Patient name: Monty Dolan  : 1965  MRN: [id number]  Referring provider: Lien Luna PA-C  Dx:   Encounter Diagnosis     ICD-10-CM    1  Atrophy of muscle of right shoulder  M62 511           Start Time:   Stop Time: 906  Total time in clinic (min): 28 minutes    Subjective: Patient reports some (R) shoulder fatigue, but no pain  Objective: See treatment diary below      Assessment: Patient tolerated PT POC initiation well  AAROM utilized during SL abduction  Appropriate (R) shoulder fatigue noted post  Patient would benefit from continued PT  Plan: Continue per plan of care        Precautions: FALL RISK, Use cushion on chairs for coccyx, Hx of Seizures, 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  Precautions: FALL RISK      Date 12/29 1/3/23      Manuals        PROM                                Neuro Re-Ed        Darek Candelario Stability        Bilateral ER        Standing TB Row/Ext L2 2x10 L2 2x10 ea + IR/ER 2x10      Seated T-spine Ext        SA Punches Stick 2x10 Stick 2x10      Bent-Over: Row, Ext, HABD                Ther Ex        Stick: FLX & ER        Table Slides        Posterior Capsule Stretch nv 15"x3 ea side      S/Lying ER/ABD nv 2x10 ea      Standing TB ER/IR L1/L2 2x10 L2 2x10      Pulleys        Wall Crawl                Ther Activity                        Modalities        CP

## 2023-01-05 ENCOUNTER — OFFICE VISIT (OUTPATIENT)
Dept: PHYSICAL THERAPY | Facility: CLINIC | Age: 58
End: 2023-01-05

## 2023-01-05 DIAGNOSIS — R26.2 AMBULATORY DYSFUNCTION: Primary | ICD-10-CM

## 2023-01-05 DIAGNOSIS — M62.511 ATROPHY OF MUSCLE OF RIGHT SHOULDER: Primary | ICD-10-CM

## 2023-01-05 NOTE — PROGRESS NOTES
Daily Note     Today's date: 2023  Patient name: Alia Pratt  : 1965  MRN: [id number]  Referring provider: Sonny Eisenmenger, DO  Dx:   Encounter Diagnosis     ICD-10-CM    1  Ambulatory dysfunction  R26 2                      Subjective: Patient reports that his L LE strength going up the steps it still a problem  His balance is improving  He is not using cane around the house, and would eventually like to not use AD  He still has limited strength when doing floor transfers  Objective: See treatment diary below  Increased step height today  Provided HEP for gastroc and hs stretching  Assessment: Tolerated treatment well  Patient demonstrated fatigue post treatment and would benefit from continued PT to improve B LE strength for function  He still required UE assistance for floor transfers  Instructed patient on HEP stretching  Plan: Continue per plan of care        Precautions: FALL RISK, Use cushion on chairs for coccyx, Hx of Seizures, 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       Manuals 12/15 12/27 12/29 1/3  1/5     HS/calves  CM CM Home LQ  HEP                      Assessment                Neuro Re-Ed               SLS Firm 30"x4 NV Firm 30"x3 ea Firm 30"x3 ea  Firm 30"x3 ea     Tandem Walking  3" holds 3x// DC too easy           Side Stepping L3 5x//             Floor transfers Reviewed for NV From kneeling to half kneel to stand 10x ea From kneeling to half kneel to stand 10x ea NV  10x ea     Step-ups: Fwd 8" 4# 3x10 ea 8" 4# 3x10 ea      10" 10x L LE     Lunges NV Reverse 10x ea Fwd 10x ea NV                                       Ther Ex               NuStep L6 10' L6 10' L6 10' L6 10'  L6 10'     SLR 3# 3x10 NV   3# 3x10       Standing: HR 4# 3x10 DC           Seated: TR 4# 3x10 DC           LAQ P2 3x10 P2 3x10 P2 3x10 P2 3x10  P3 3x10     HC P4 3x10 P4 3x10 P4 3x10 P4 3x10  P5 3x10     Standing: Hip FLX 4# 3x10 4# 3x10 HEP         Standing: Hip ABD 4# 3x10 4# 3x10 HEP       Standing: Hip Ext 4# 3x10 4# 3x10 HEP         Seated FLX c/ SB: 3 way 10x ea way 10x ea way 10x ea way 10x ea way 10x ea way     Leg Press: S3 P2 3x10 P2 4x10 P3 3x10 P3 3x10  P3 2x10                                     Modalities                MHP     With TE With TE  W/ TE

## 2023-01-05 NOTE — PROGRESS NOTES
Daily Note     Today's date: 2023  Patient name: Tank Sellers  : 1965  MRN: [id number]  Referring provider: Tomasz Zambrano PA-C  Dx:   Encounter Diagnosis     ICD-10-CM    1  Atrophy of muscle of right shoulder  M62 511                      Subjective: Patient reports that he has minimal pain in R shoulder, but has crepitus and limited strength  Objective: See treatment diary below  Incorporated pulleys into program today  Assessment: Tolerated treatment well  Patient demonstrated fatigue post treatment and would benefit from continued PT to improve ROM, strength, and stability in R shoulder  He presents with crepitus and has pain end feel on all planes during PROM  Plan: Continue per plan of care        Precautions: FALL RISK, Use cushion on chairs for coccyx, Hx of Seizures, 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      Date 12/29 1/3/23 1/5     Manuals        PROM all planes   CM                             Neuro Re-Ed        Ball Stability        Bilateral ER        Standing TB Row/Ext L2 2x10 L2 2x10 ea L2 2x10 ea     Seated T-spine Ext        SA Punches Stick 2x10 Stick 2x10 Stick 2x10     Bent-Over: Row, Ext, HABD                Ther Ex        Stick: FLX & ER        Table Slides        Posterior Capsule Stretch nv 15"x3 ea side 20"x4 ea     S/Lying ER/ABD nv 2x10 ea 2x10 ea     Standing TB ER/IR L1/L2 2x10 L2 2x10 L2 2x10     Pulleys   3' scap     Wall Crawl                Ther Activity                        Modalities        CP

## 2023-01-10 ENCOUNTER — APPOINTMENT (OUTPATIENT)
Dept: PHYSICAL THERAPY | Facility: CLINIC | Age: 58
End: 2023-01-10

## 2023-01-12 ENCOUNTER — APPOINTMENT (OUTPATIENT)
Dept: PHYSICAL THERAPY | Facility: CLINIC | Age: 58
End: 2023-01-12

## 2023-01-12 ENCOUNTER — OFFICE VISIT (OUTPATIENT)
Dept: PHYSICAL THERAPY | Facility: CLINIC | Age: 58
End: 2023-01-12

## 2023-01-12 DIAGNOSIS — R26.2 AMBULATORY DYSFUNCTION: Primary | ICD-10-CM

## 2023-01-12 DIAGNOSIS — M62.511 ATROPHY OF MUSCLE OF RIGHT SHOULDER: Primary | ICD-10-CM

## 2023-01-12 NOTE — PROGRESS NOTES
Daily Note     Today's date: 2023  Patient name: Carlos Duncan  : 1965  MRN: [id number]  Referring provider: Bridger Hamlin DO  Dx:   Encounter Diagnosis     ICD-10-CM    1  Ambulatory dysfunction  R26 2           Start Time: 0800  Stop Time: 08  Total time in clinic (min): 35 minutes    Subjective: Patient denies any significant changes since last visit  Objective: See treatment diary below      Assessment: Tolerated treatment well  Patient demonstrated fatigue post treatment, exhibited good technique with therapeutic exercises and would benefit from continued PT  Remained for close supervision during floor transfers secondary to occasional contact of posterior foot contacting Airex foam during transition from tall knee to half kneel  Did not require any PT touchA this visit during these transfers  Plan: Continue per plan of care        Precautions: FALL RISK, Use cushion on chairs for coccyx, Hx of Seizures, 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      Manuals 12/15 12/27 12/29 1/3  1/5  1/12   HS/calves  CM CM Home LQ  HEP                      Assessment                Neuro Re-Ed               SLS Firm 30"x4 NV Firm 30"x3 ea Firm 30"x3 ea  Firm 30"x3 ea  Firm 30"x3 ea   Tandem Walking  3" holds 3x// DC too easy           Side Stepping L3 5x//             Floor transfers Reviewed for NV From kneeling to half kneel to stand 10x ea From kneeling to half kneel to stand 10x ea NV  10x ea  10x ea Airex under knee   Step-ups: Fwd 8" 4# 3x10 ea 8" 4# 3x10 ea      10" 10x L LE  10" 10x ea   Lunges NV Reverse 10x ea Fwd 10x ea NV                                       Ther Ex               NuStep L6 10' L6 10' L6 10' L6 10'  L6 10'  L6 10' Endurance & LE ROM   SLR 3# 3x10 NV   3# 3x10       Standing: HR 4# 3x10 DC           Seated: TR 4# 3x10 DC           LAQ P2 3x10 P2 3x10 P2 3x10 P2 3x10  P3 3x10 P3 3x10   HC P4 3x10 P4 3x10 P4 3x10 P4 3x10  P5 3x10  P5 3x10   Standing: Hip FLX 4# 3x10 4# 3x10 HEP         Standing: Hip ABD 4# 3x10 4# 3x10 HEP         Standing: Hip Ext 4# 3x10 4# 3x10 HEP         Seated FLX c/ SB: 3 way 10x ea way 10x ea way 10x ea way 10x ea way 10x ea way  10x ea way   Leg Press: S3 P2 3x10 P2 4x10 P3 3x10 P3 3x10  P3 2x10  P3 2x10                                   Modalities                MHP     With TE With TE  W/ TE

## 2023-01-12 NOTE — PROGRESS NOTES
Daily Note     Today's date: 2023  Patient name: Latasha Long  : 1965  MRN: [id number]  Referring provider: Eddie Sampson PA-C  Dx:   Encounter Diagnosis     ICD-10-CM    1  Atrophy of muscle of right shoulder  M62 511           Start Time: 836  Stop Time: 905  Total time in clinic (min): 29 minutes    Subjective: Reports he tried to get his arm OH to  an item in the cabinet yesterday and experienced pain and clicking  Objective: See treatment diary below      Assessment: Tolerated treatment well  Patient demonstrated fatigue post treatment, exhibited good technique with therapeutic exercises and would benefit from continued PT  Noted clicking in right shoulder during pulleys and side-lying abduction  Only able to attain approx 60* side-lying abduction likely due to combination of pain and limited strength  ER/IR ROM good during manual PROM, but OH elevation limited by pain; however, empty end feel present  Plan: Continue per plan of care        Precautions: FALL RISK, Use cushion on chairs for coccyx, Hx of Seizures, 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      Date 12/29 1/3/23 1/5 1/12    Manuals        PROM all planes   CM KS                            Neuro Re-Ed        Ball Stability        Bilateral ER        Standing TB Row/Ext L2 2x10 L2 2x10 ea L2 2x10 ea L2 2x10ea    Seated T-spine Ext        SA Punches Stick 2x10 Stick 2x10 Stick 2x10 Stick 2x10    Bent-Over: Row, Ext, HABD    nv            Ther Ex        Stick: FLX & ER        Table Slides        Posterior Capsule Stretch nv 15"x3 ea side 20"x4 ea 20"x4 ea    S/Lying ER/ABD nv 2x10 ea 2x10 ea 2x10ea    Standing TB ER/IR L1/L2 2x10 L2 2x10 L2 2x10 L2 2x10    Pulleys   3' scap 3' scap    Wall Crawl                Ther Activity                        Modalities        CP

## 2023-01-16 ENCOUNTER — APPOINTMENT (OUTPATIENT)
Dept: PHYSICAL THERAPY | Facility: CLINIC | Age: 58
End: 2023-01-16

## 2023-01-19 ENCOUNTER — APPOINTMENT (OUTPATIENT)
Dept: PHYSICAL THERAPY | Facility: CLINIC | Age: 58
End: 2023-01-19

## 2023-01-19 ENCOUNTER — OFFICE VISIT (OUTPATIENT)
Dept: PHYSICAL THERAPY | Facility: CLINIC | Age: 58
End: 2023-01-19

## 2023-01-19 DIAGNOSIS — M62.511 ATROPHY OF MUSCLE OF RIGHT SHOULDER: Primary | ICD-10-CM

## 2023-01-19 DIAGNOSIS — R26.2 AMBULATORY DYSFUNCTION: Primary | ICD-10-CM

## 2023-01-19 NOTE — PROGRESS NOTES
Daily Note     Today's date: 2023  Patient name: Ajith Khan  : 1965  MRN: [id number]  Referring provider: Smitha Solano DO  Dx:   Encounter Diagnosis     ICD-10-CM    1  Ambulatory dysfunction  R26 2                      Subjective: Patient reports he is going up the steps easier  Still has some weakness in L LE  Objective: See treatment diary below  Incorporated Bike into program for B LE strength, ROM, and endurance  Assessment: Tolerated treatment well  Patient demonstrated fatigue post treatment and would benefit from continued PT to improve B LE strength and endurance for function  Patient did well with progression  He is showing improvements in strength and endurance  Patient is not using AD for ambualtion on even surfaces  Uses SPC on uneven ground  Floor strangers are becoming easier  Plan: Continue per plan of care        Precautions: FALL RISK, Use cushion on chairs for coccyx, Hx of Seizures, 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        Manuals 1/19 12/27 12/29 1/3  1/5  1/12   HS/calves   CM Home LQ  HEP                     Assessment               Neuro Re-Ed              SLS Airex 30"x3 ea NV Firm 30"x3 ea Firm 30"x3 ea  Firm 30"x3 ea  Firm 30"x3 ea   Tandem Walking   DC too easy           Side Stepping                                                     Ther Ex              NuStep  L6 10' L6 10' L6 10'  L6 10'  L6 10' Endurance & LE ROM   Bike S: 8 L4-5 10' m strength/endurance ROM        SLR  NV   3# 3x10       LAQ P3 3x10 P2 3x10 P2 3x10 P2 3x10  P3 3x10 P3 3x10   HC P5 3x10 P4 3x10 P4 3x10 P4 3x10  P5 3x10  P5 3x10   Seated FLX c/ SB: 3 way 10x ea way 10x ea way 10x ea way 10x ea way 10x ea way  10x ea way   Leg Press: S3 P3 2x10 P2 4x10 P3 3x10 P3 3x10  P3 2x10  P3 2x10    There Act:               Floor Transfers Airex under knees high knee to half kneel to stand 10x ea             Lunges         Step ups fwd                  Modalities               MHP W/ TE    With TE With TE  W/ TE

## 2023-01-19 NOTE — PROGRESS NOTES
Daily Note     Today's date: 2023  Patient name: Angelito Celeste  : 1965  MRN: [id number]  Referring provider: Melonie Henry PA-C  Dx:   Encounter Diagnosis     ICD-10-CM    1  Atrophy of muscle of right shoulder  M62 511                      Subjective: Patient reports soreness in R shoulder  No changes  Objective: See treatment diary below  Progressed SL ER and TB IR today  Assessment: Tolerated treatment well  Patient demonstrated fatigue post treatment and would benefit from continued PTto improve scap strength and moblity in R shoulder  He has discomfort and crepitus on FF  His abduction PROM seemed to have improvement  Plan: Continue per plan of care        Precautions: FALL RISK, Use cushion on chairs for coccyx, Hx of Seizures, 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      Date 12/29 1/3/23 1/5 1/12 1/19   Manuals        PROM all planes   CM KS CM                           Neuro Re-Ed        Ball Stability        Bilateral ER        Standing TB Row/Ext L2 2x10 L2 2x10 ea L2 2x10 ea L2 2x10ea L3 2x10 ea   Seated T-spine Ext        SA Punches Stick 2x10 Stick 2x10 Stick 2x10 Stick 2x10 Stick 2x10   Bent-Over: Row, Ext, HABD    nv            Ther Ex        Stick: FLX & ER        Table Slides        Posterior Capsule Stretch nv 15"x3 ea side 20"x4 ea 20"x4 ea    S/Lying ER/ABD nv 2x10 ea 2x10 ea 2x10ea 1# 2x10 ea   Standing TB ER L1/L2 2x10 L2 2x10 L2 2x10 L2 2x10 L2 2x10   Standing TB IR     L3 2x10   Pulleys   3' scap 3' scap 3' scap   Wall Crawl                Ther Activity                        Modalities        CP

## 2023-01-23 ENCOUNTER — APPOINTMENT (OUTPATIENT)
Dept: PHYSICAL THERAPY | Facility: CLINIC | Age: 58
End: 2023-01-23

## 2023-01-23 ENCOUNTER — OFFICE VISIT (OUTPATIENT)
Dept: PHYSICAL THERAPY | Facility: CLINIC | Age: 58
End: 2023-01-23

## 2023-01-23 DIAGNOSIS — M62.511 ATROPHY OF MUSCLE OF RIGHT SHOULDER: Primary | ICD-10-CM

## 2023-01-23 DIAGNOSIS — R26.2 AMBULATORY DYSFUNCTION: Primary | ICD-10-CM

## 2023-01-23 NOTE — PROGRESS NOTES
Daily Note     Today's date: 2023  Patient name: Ajith Khan  : 1965  MRN: [id number]  Referring provider: Smitha Solano DO  Dx:   Encounter Diagnosis     ICD-10-CM    1  Ambulatory dysfunction  R26 2                      Subjective: Patient reports that he has more neuropathy at night time which is keeping his awake  This has been happening the passed 2 weeks  He has m spasms and sharp/shooting pain  OTC tylenol helps slightly  RTD Wednesday  Objective: See treatment diary below  Increased resistance on leg press  Assessment: Tolerated treatment well  Patient demonstrated fatigue post treatment and would benefit from continued PT to improve B LE strength for function  Patient has more steady ambulation and does not required AD with level surfaces  His B LE strength is improving due to being able to tolerate progression today  Transfers are more fluent with transition from floor to standing  Plan: Continue per plan of care          Precautions: FALL RISK, Use cushion on chairs for coccyx, Hx of Seizures, 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        Manuals 1/19 1/23  1/3  1/5  1/12   HS/calves     Home LQ  HEP                     Assessment               Neuro Re-Ed              SLS Airex 30"x3 ea Airex 30"x3 Firm 30"x3 ea Firm 30"x3 ea  Firm 30"x3 ea  Firm 30"x3 ea   Tandem Walking               Side Stepping                                                           Ther Ex              NuStep    L6 10' L6 10'  L6 10'  L6 10' Endurance & LE ROM   Bike S: 8 L4-5 10' m strength/endurance ROM L4-5 10' m strength/endurance ROM           LAQ P3 3x10 P3 3x10 P2 3x10 P2 3x10  P3 3x10 P3 3x10   HC P5 3x10 P5 3x10 P4 3x10 P4 3x10  P5 3x10  P5 3x10   Seated FLX c/ SB: 3 way 10x ea way 10x ea way 10x ea way 10x ea way 10x ea way  10x ea way   Leg Press: S3 P3 2x10 P4 3x10 P3 3x10 P3 3x10  P3 2x10  P3 2x10    There Act:               Floor Transfers Airex under knees high knee to half kneel to stand 10x ea Airex under knees high knee to half kneel to stand 10x ea           Lunges              Step ups fwd                             Modalities               MHP W/ TE  W/ TE With TE With TE  W/ TE

## 2023-01-23 NOTE — PROGRESS NOTES
Daily Note     Today's date: 2023  Patient name: Porsha Glynn  : 1965  MRN: [id number]  Referring provider: Lily Palmer PA-C  Dx:   Encounter Diagnosis     ICD-10-CM    1  Atrophy of muscle of right shoulder  M62 511                      Subjective: Patient reports that ROM and strength are improving in R UE but still experiences crepitus and pain  Objective: See treatment diary below  Incorporated Stick Flex/ER  Assessment: Tolerated treatment well  Patient demonstrated fatigue post treatment and would benefit from continued PT to improve R UE strength and ROM for functionality  Patient's strength and ROM in R shoulder is improving  He still has crepitus on FF ROM  Plan: Continue per plan of care        Precautions: FALL RISK, Use cushion on chairs for coccyx, Hx of Seizures, 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      Date 1/23 1/3/23 1/5 1/12 1/19   Manuals        PROM all planes CM  CM KS CM                           Neuro Re-Ed        Ball Stability        Bilateral ER        Standing TB Row/Ext L3 2x10 ea L2 2x10 ea L2 2x10 ea L2 2x10ea L3 2x10 ea   SA Punches Stick 2# 2x10 Stick 2x10 Stick 2x10 Stick 2x10 Stick 2x10                   Ther Ex        Stick: FLX & ER 5"x20 ea       Posterior Capsule Stretch  15"x3 ea side 20"x4 ea 20"x4 ea    S/Lying ER/ABD 1# 2x10 ea 2x10 ea 2x10 ea 2x10ea 1# 2x10 ea   Standing TB ER L1/L2 2x10 L2 2x10 L2 2x10 L2 2x10 L2 2x10   Standing TB IR L3 2x10    L3 2x10   Pulleys 4' Scap  3' scap 3' scap 3' scap           Ther Activity        Black burns x3 NV       Over head lifts        Modalities        CP

## 2023-01-25 ENCOUNTER — TELEPHONE (OUTPATIENT)
Dept: GASTROENTEROLOGY | Facility: CLINIC | Age: 58
End: 2023-01-25

## 2023-01-26 ENCOUNTER — EVALUATION (OUTPATIENT)
Dept: PHYSICAL THERAPY | Facility: CLINIC | Age: 58
End: 2023-01-26

## 2023-01-26 ENCOUNTER — APPOINTMENT (OUTPATIENT)
Dept: PHYSICAL THERAPY | Facility: CLINIC | Age: 58
End: 2023-01-26

## 2023-01-26 DIAGNOSIS — R26.2 AMBULATORY DYSFUNCTION: Primary | ICD-10-CM

## 2023-01-26 DIAGNOSIS — M62.511 ATROPHY OF MUSCLE OF RIGHT SHOULDER: Primary | ICD-10-CM

## 2023-01-26 NOTE — PROGRESS NOTES
PT Re-Evaluation     Today's date: 2023  Patient name: Gaby Jimenez  : 1965  MRN: [id number]  Referring provider: Rustam Summers PA-C  Dx:   Encounter Diagnosis     ICD-10-CM    1  Atrophy of muscle of right shoulder  M62 511           Start Time: 830  Stop Time: 905  Total time in clinic (min): 35 minutes    Assessment  Assessment details: Patient is a 66 yo male with medical diagnosis of atrophy of right shoulder musculature  During re-evaluation, improved right shoulder AROM  Decreased guarding and spasm present during PROM as well  Despite progress, continues to demonstrate scapular weakness and impaired strength of right shoulder compared to left, especially at end range and at ranges beyond mid-range ROM  Will plan to incorporate bent over exercises next visit to combat scap weakness  Incorporated bicep and triceps strengthening this visit to improve force transfer between shoulder and elbow during overhead and functional movements  Patient continues to progress towards LTGs, but they are not met at this time  Patient will continue to benefit from skilled PT interventions to address remaining impairments and functional limitations  Impairments: abnormal or restricted ROM, activity intolerance, impaired physical strength, pain with function, scapular dyskinesis and poor posture     Goals  STG (3 weeks): Increase right shoulder ROM by 10 degrees elevation  Met  Improve R SH strength by 1/2 grade  Met  LTG (8 weeks):  R SH ROM WNL  Progressing   >=4/5 R Shoulder Strength Progressing  75% improvement in right shoulder pain  Progressing  Patient will be independent with HEP in 8 weeks to allow independent management of condition  Progressing  Plan  Plan details: TE, NMR, TA, MT, self-care, and modalities as needed in order to progress through skilled PT focused on ROM, strength, posture, motor control     Patient would benefit from: skilled physical therapy  Planned modality interventions: cryotherapy and thermotherapy: hydrocollator packs  Planned therapy interventions: manual therapy, neuromuscular re-education, patient education, self care, therapeutic activities, therapeutic exercise and home exercise program  Frequency: 2x week  Duration in weeks: 8  Treatment plan discussed with: patient        Subjective Evaluation    History of Present Illness  Mechanism of injury: IE: Patient is a 64 yo male presenting with right shoulder pain  He has history of fall and fracture on this shoulder c/ proximal oral plate and screws, which are "well-healed " Per chart review of ortho note from 22  Symptoms began a few weeks ago, he felt a click and thought his shoulder was dislocated  Has had pain since, (-) for dislocation per ortho  Moving his shoulder in all directions is painful  Denies any falls on right shoulder  Patients primary goals for PT are to to decrease pain  UPDATE : Patient reports approx  50-60% improvement in right shoulder pain  Continues to have difficulty reaching into top shelf and lifting any weight overhead even c/ b/l arms; however has less pain and improved function when not lifting weights     Pain  Current pain ratin    Patient Goals  Patient goals for therapy: decreased pain, increased motion, increased strength and return to sport/leisure activities          Objective    Scapular Stability: Weakness evident during rows c/ compensatory pattern of upper trap despite vc's    Shoulder ROM (degrees):  FLX (R/L): 115 / 137  ABD (R/L): 116 / 115  ER (R/L): 61 / 50  IR (R/L): T12 / T8    Shoulder Strength (MMT Grades):  FLX (R/L): 4- / 4+  ABD (R/L): 4- / 4+  ER (R/L): 4- / 4+  IR (R/L): 4- / 4+    Shoulder Special Tests (R):  Denis: (+) in scap plane           Precautions: FALL RISK      Date    Manuals        PROM all planes CM KS  KS CM   Assessment  KS                      Neuro Re-Ed        Ball Stability        Bilateral ER nv      Standing TB Row/Ext L3 2x10 ea L3 2x10  L2 2x10ea L3 2x10 ea   SA Punches Stick 2# 2x10 Stick 2# 2x10  Stick 2x10 Stick 2x10   Bent Over: Row, Ext, HABD  nv              Ther Ex        Stick: FLX & ER 5"x20 ea 5"x20ea DC ER     Posterior Capsule Stretch    20"x4 ea    S/Lying ER/ABD 1# 2x10 ea   2x10ea 1# 2x10 ea   Standing TB ER L1/L2 2x10 L2 2x10  L2 2x10 L2 2x10   Standing TB IR L3 2x10 L3 2x10   L3 2x10   Pulleys 4' Scap 2' scap  3' scap 3' scap   Bicep Curls  L3 3x10      Tricep Ext  L3 3x10              Ther Activity        Over head lifts  nv              Modalities        CP

## 2023-01-26 NOTE — PROGRESS NOTES
PT Discharge    Today's date: 2023  Patient name: Lucia Workman  : 1965  MRN: [id number]  Referring provider: Mikala Strange DO  Dx:   Encounter Diagnosis     ICD-10-CM    1  Ambulatory dysfunction  R26 2           Start Time: 0800  Stop Time: 0830  Total time in clinic (min): 30 minutes    Assessment  Assessment details: Patient is a 64 yo male with medical diagnosis of gait dysfunction  Improved gait mechanics, not using AD for most ambulation  Denies any falls since prior to beginning PT  Five time sit to stand and TUG improved to below threshold for fall risk  LE strength continues to improve, most limited on left side  Patient demonstrates continued progress towards LTGs and they are partially met or progressing at this time  He demonstrates independence c/ HEP and will benefit from transition to independent management c/ HEP  He is discharged from PT for his balance and gait at this time, will contact us for further care as indicated  Impairments: abnormal gait, abnormal or restricted ROM, abnormal movement, activity intolerance, impaired balance, impaired physical strength, pain with function and safety issue    Goals  STG (3 weeks): Improve Five Time Sit to Stand by  2 3"  Met  Increase LE strength by 1/2 grade  Met  LTG (6 weeks):  TUG <13 5"  Met  Five Time Sit to Stand <13 6"  Met   LE Strength >=4+/5  Partially met, progressing  Patient will be independent with HEP in 6 weeks to allow independent management of condition  Met  Plan  Plan details: D/c patient to independent management c/ HEP  Treatment plan discussed with: patient        Subjective Evaluation    History of Present Illness  Mechanism of injury: Patient is a 64 yo male presenting with balance and gait dysfunction  Patient was coming [facility] prior to being hospitalized for a fall in 2022  Was hospitalized for several weeks and admitted to a nursing home for PT   Followed up with his family doctor after SNF and prescribed PT  Most recent fall was about 3 weeks ago, reports he "only bumped my eye " Uses a RW for all ambulation at home and in the community  Rarely, "takes very short trips without it (his RW), but theres always something to grab onto if I do this " PMHx is (+) for seizure disorder and history of back fractures  Patients primary goals for PT are to increase strength, improve balance, improve gait and decrease risk of falls  UPDATE : Patient presents to PT using NBQC for ambulation, but reports he doesn't use an AD most of the time at home  Has noticed significant progress in his strength and function since beginning PT  UPDATE : Strength continues to improve  Denies falls  Left leg weaker compared to right  UPDATE : Reports only continued concern is left leg weakness  Is now walking s/ AD for almost all ambulation and denies falls  Pain  Current pain ratin  At worst pain rating: 10  Location: low back pain    Social Support  Steps to enter house: yes (7 TIFF c/ u/l right ascending railing)  Stairs in house: no   Lives in: [facility] (walk-in shower and shower chair)    Treatments  Previous treatment: physical therapy  Patient Goals  Patient goals for therapy: improved balance, increased strength, return to sport/leisure activities and independence with ADLs/IADLs          Objective   Gait: No AD, weakness when turning to right, weakness in left legs results in difficulty bringing his LE around as he turns      Transfers:     -Sit to Stand: Ind no use of UE     -Sit <> Supine: Ind      TU" s/ AD and no UE use to stand  Five Time Sit to Stand: 8" no UE use     MMT (R/L):  Hip FLX: -  Hip ABD: -  Knee EXT: --  Knee FLX: -  Ankle DF: - -           Precautions: FALL RISK, Use cushion on chairs for coccyx, Hx of Seizures, 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    Manuals 1/19 1/23 1/26 1/3  1/5  1/12   HS/calves      LQ  HEP                     Assessment      KS       Neuro Re-Ed              SLS Airex 30"x3 ea Airex 30"x3 Airex 30"x3 Firm 30"x3 ea  Firm 30"x3 ea  Firm 30"x3 ea   Tandem Walking               Side Stepping                                                           Ther Ex              NuStep    L6 5' L6 10'  L6 10'  L6 10' Endurance & LE ROM   Bike S: 8 L4-5 10' m strength/endurance ROM L4-5 10' m strength/endurance ROM  L4 5'         LAQ P3 3x10 P3 3x10 P3 b/l 1x10, SL 2x10 P2 3x10  P3 3x10 P3 3x10   HC P5 3x10 P5 3x10  P4 3x10  P5 3x10  P5 3x10   Seated FLX c/ SB: 3 way 10x ea way 10x ea way  10x ea way 10x ea way  10x ea way   Leg Press: S3 P3 2x10 P4 3x10 P4 3x10 P3 3x10  P3 2x10  P3 2x10    There Act:               Floor Transfers Airex under knees high knee to half kneel to stand 10x ea Airex under knees high knee to half kneel to stand 10x ea  Airex under knees high knee to half kneel to stand 10x ea         Lunges              Step ups fwd                             Modalities               MHP W/ TE  W/ TE With TE With TE  W/ TE

## 2023-01-30 ENCOUNTER — APPOINTMENT (OUTPATIENT)
Dept: PHYSICAL THERAPY | Facility: CLINIC | Age: 58
End: 2023-01-30

## 2023-01-30 ENCOUNTER — OFFICE VISIT (OUTPATIENT)
Dept: PHYSICAL THERAPY | Facility: CLINIC | Age: 58
End: 2023-01-30

## 2023-01-30 ENCOUNTER — APPOINTMENT (OUTPATIENT)
Dept: LAB | Facility: MEDICAL CENTER | Age: 58
End: 2023-01-30

## 2023-01-30 ENCOUNTER — CONSULT (OUTPATIENT)
Dept: NEPHROLOGY | Facility: CLINIC | Age: 58
End: 2023-01-30

## 2023-01-30 VITALS — DIASTOLIC BLOOD PRESSURE: 82 MMHG | SYSTOLIC BLOOD PRESSURE: 98 MMHG | WEIGHT: 108 LBS | BODY MASS INDEX: 19.13 KG/M2

## 2023-01-30 DIAGNOSIS — K70.30 ALCOHOLIC CIRRHOSIS OF LIVER WITHOUT ASCITES (HCC): ICD-10-CM

## 2023-01-30 DIAGNOSIS — M62.511 ATROPHY OF MUSCLE OF RIGHT SHOULDER: Primary | ICD-10-CM

## 2023-01-30 DIAGNOSIS — E87.1 CHRONIC HYPONATREMIA: Primary | ICD-10-CM

## 2023-01-30 DIAGNOSIS — I10 ESSENTIAL HYPERTENSION: ICD-10-CM

## 2023-01-30 DIAGNOSIS — R79.89 LOW SERUM CORTISOL LEVEL: ICD-10-CM

## 2023-01-30 DIAGNOSIS — E87.1 CHRONIC HYPONATREMIA: ICD-10-CM

## 2023-01-30 LAB
ALBUMIN SERPL BCP-MCNC: 3.9 G/DL (ref 3.5–5)
ALP SERPL-CCNC: 121 U/L (ref 46–116)
ALT SERPL W P-5'-P-CCNC: 17 U/L (ref 12–78)
ANION GAP SERPL CALCULATED.3IONS-SCNC: 11 MMOL/L (ref 4–13)
AST SERPL W P-5'-P-CCNC: 27 U/L (ref 5–45)
BILIRUB SERPL-MCNC: 0.46 MG/DL (ref 0.2–1)
BUN SERPL-MCNC: 19 MG/DL (ref 5–25)
CALCIUM SERPL-MCNC: 9.8 MG/DL (ref 8.3–10.1)
CHLORIDE SERPL-SCNC: 83 MMOL/L (ref 96–108)
CO2 SERPL-SCNC: 25 MMOL/L (ref 21–32)
CREAT SERPL-MCNC: 0.85 MG/DL (ref 0.6–1.3)
GFR SERPL CREATININE-BSD FRML MDRD: 97 ML/MIN/1.73SQ M
GLUCOSE P FAST SERPL-MCNC: 88 MG/DL (ref 65–99)
MAGNESIUM SERPL-MCNC: 1.9 MG/DL (ref 1.6–2.6)
OSMOLALITY UR: 278 MMOL/KG
POTASSIUM SERPL-SCNC: 3.9 MMOL/L (ref 3.5–5.3)
PROT SERPL-MCNC: 8.6 G/DL (ref 6.4–8.4)
PTH-INTACT SERPL-MCNC: 28.6 PG/ML (ref 18.4–80.1)
SODIUM 24H UR-SCNC: 15 MOL/L
SODIUM SERPL-SCNC: 119 MMOL/L (ref 135–147)

## 2023-01-30 RX ORDER — LISINOPRIL 2.5 MG/1
2.5 TABLET ORAL DAILY
Qty: 90 TABLET | Refills: 2 | Status: SHIPPED | OUTPATIENT
Start: 2023-01-30

## 2023-01-30 NOTE — PROGRESS NOTES
Assessment & Plan:    1  Chronic hyponatremia  Comments:  advised to fluid restrict to 1L/day  advised to reduce Bumex to 1mg once daily  advised to increase Na in diet  referral placed to nephrology  Orders:  -     Ambulatory Referral to Nephrology  -     Comprehensive metabolic panel; Future; Expected date: 01/30/2023  -     Magnesium; Future; Expected date: 01/30/2023  -     Osmolality, urine; Future; Expected date: 01/30/2023  -     Sodium, urine, random; Future; Expected date: 01/30/2023  -     lisinopril (ZESTRIL) 2 5 mg tablet; Take 1 tablet (2 5 mg total) by mouth daily  -     PTH, intact; Future; Expected date: 01/30/2023    2  Alcoholic cirrhosis of liver without ascites ([facility] [address] )    3  Low serum cortisol level    4  Essential hypertension       1  Chronic hyponatremia, previously attributed to Adrenal insufficiency  Hypoosmolar, euvolemic at present, chronic >48 hours, not at risk for overcorrecting , potentially symptomatic in November  Last serum sodium 121 on 11/14/22  This was improved from 116 10/13/22  Sodium had been in low 120 range in the fall  He was hypervolemic in May 2022 and required paracentesis  His legs were edematous  Today, patient appears euvolemic, no noted ascites or lower extremity edema  Potential etiologies of hyponatremia can include insufficient solute/beer botomania or cirrhosis  He was taken off hydocortisone > 6 months ago per pt  Endocrine stated he had normal cortisol and ACTH testing  Lisinopril can contribute to nonsomotic ADH release as well  Recommend  1  Reviewed potential etiologies of hyponatremia with patient  No recent labs since November, sodium was 121 on 11/14/22  In cirrhotic, normal sodium can be very difficult to achieve  An acceptable sodium would be 125-129  If sodium< 120 should be hospitalized for albumin or HTS  Discourage use of tolvpatan in pt with cirrhosis due to risk for acute liver failure and death      Fluid restriction can be very difficult for cirrhotic to maintain  He is not currently following one and thinks he is drinking around 50 ounces  He is not following any sodium restrictions  In the remote past, tolerated salt tablets but trialed in Oct-Nov and could not tolerate  Recommend to check labs today-CMP/mag/PTH/urine sodium/osm  Further recs for sodium management based on labs  2  Essential HTN  BP can run lower in cirrhotic patient  Lisinopril dose too high for patient at 10mg/day  His BP in the office was 98/82, but he was asymptomatic for orthostatic symptoms  Recommend to decrease lisinopril to low dose at 2 5mg/day for now  Blood work is ordered today so plans may need to change depending on results  Rx sent to pharmacy for lisinopril 2 5mg/day    3  Alcoholism history--quit 9 months ago  Drinks nonalcoholic Hernandez drinks now  4  Low serum cortisol  Evaluated by endocrine in October, no need for hydrocortisone at that time  Cortisol and ACTH testing normal  Would defer to endocrine in regards to adrenal insufficiency management in future as well  5  Alcoholic cirrhosis, quit drinking 9 months ago  Needs follow up with GI Feb-March  Will need repeat C scope and endoscopy from prior documentation  Assess liver transplant candidacy at discretion of GI  The benefits, risks and alternatives to the treatment plan were discussed at this visit  Patient was advised of common adverse effects of any medical therapies prescribed  All questions were answered and discussed with the patient and any accompanying family members or caretakers  Subjective:      Patient ID: Jevon Bustamante is a 65 y o  male presenting for follow up in the [facility] office for hyponatremia  Previously seen at [facility] by nephrology team for hyponatremia in May 2022  Etiology felt to be due to AI at that time and was on steroid replacement       Seen by endocrinology on 10/7/22 and was off steroid replacement with normal fasting cortisol and ACTH levels  Endocrine also following for osteoporosis after Dexa scan  He has hx alcoholic cirrhosis and follows with GI  Seen by GI on 4/67/80 due to alcoholic cirrhosis of liver without ascites, pancreatic pseudocyst and acute on chronic pancreatitis  He was felt not to a candidate for liver transplant as he had only stopped drinking recently  He was told to have US and AFP every 6 months  He needed to have endoscopy and colonoscopy in January  Labs were last obtained in November and showed Na 131  I do not have more recent labs  His Cr was 1 01 and eGFR 82ml/min  Although this may not be accurate in cirrhotic with decreased muscle mass  Uinr osm 248, serum sodium 268 (hypoosmolar), urine sodium 30  He was told to restrict his fluid intake to 1 L at this time  MELD score could not be completed from November without INR  Last renal imagine 5/20/22 showed unremarkable kidneys but limited without IVF dye  HPI    In interim since May, he had right shoulder pain  Original shoulder injury 6 years ago  Reoccurred in November  In October sodium very low at 116  Sodium rechecked 11/14/22 and was 121  No more recent labs since then  He was previously on sodium chloride tablets but did not tolerate  Going to PT twice a week  Denies cp/sob/n/v/d/dizziness  Reports great appetite  Weight always fluctuates between 110-130  His weight is 108 lb, reports no recent weight changes  Reports hx paracentesis in the past, last done in May 2022  Denies leg swelling  In May, legs very swollen  Takes bumex 1mg once per day  Bumex cut back to 1mg per day in November, he was taking twice a day  Reported feeling better on 1mg/ day  Does not check BP at home  Has been taking lisinopril for years  His BP was 98/82, he is asymptomatic  Previously on aldactone, but not taking     Told to be on FR in the past  Could not tolerate in the past  Not on any sodium restrictions  Drinking about 50 ounces  Reports constipation with BM every 3 days  Denies lactulose, takes colace  Cortef stopped > 6 months  Reports low back pain and only takes tylenol  Denies NSAID use  Denies BRBPR and melena  Quit drinking 9 months ago  Drinks Hernandez nonalcoholic  Used to smoke 4 packs per day and removed asbestosis for 10 years  Down to 1 pack per day  The following portions of the patient's history were reviewed and updated as appropriate: allergies, current medications, past family history, past medical history, past social history, past surgical history, and problem list     Review of Systems   Respiratory: Negative  Cardiovascular: Negative  Gastrointestinal: Negative  Genitourinary: Negative  Neurological: Negative  All other systems reviewed and are negative  Objective:      BP 98/82 (BP Location: Left arm, Patient Position: Sitting, Cuff Size: Standard)   Wt 49 kg (108 lb)   BMI 19 13 kg/m²          Physical Exam  Vitals and nursing note reviewed  Constitutional:       Appearance: He is not ill-appearing  HENT:      Head: Atraumatic  Nose: Nose normal       Mouth/Throat:      Mouth: Mucous membranes are moist       Pharynx: Oropharynx is clear  Cardiovascular:      Rate and Rhythm: Normal rate and regular rhythm  Heart sounds: No friction rub  Pulmonary:      Breath sounds: No wheezing, rhonchi or rales  Abdominal:      General: Bowel sounds are normal  There is no distension  Palpations: Abdomen is soft  Tenderness: There is no abdominal tenderness  There is no guarding  Musculoskeletal:      Right lower leg: No edema  Left lower leg: No edema  Skin:     General: Skin is warm  Neurological:      General: No focal deficit present  Mental Status: He is alert and oriented to person, place, and time  Cranial Nerves: No cranial nerve deficit     Psychiatric:         Mood and Affect: Mood normal          Behavior: Behavior normal              Lab Results   Component Value Date     12/31/2015    SODIUM 121 (L) 11/14/2022    K 4 2 11/14/2022    CL 89 (L) 11/14/2022    CO2 25 11/14/2022    ANIONGAP 11 12/31/2015    AGAP 7 11/14/2022    BUN 31 (H) 11/14/2022    CREATININE 1 01 11/14/2022    GLUC 131 05/26/2022    GLUF 69 11/14/2022    CALCIUM 9 4 11/14/2022    AST 25 11/14/2022    ALT 17 11/14/2022    ALKPHOS 138 (H) 11/14/2022    PROT 7 1 12/31/2015    TP 8 2 11/14/2022    BILITOT 0 47 12/31/2015    TBILI 0 39 11/14/2022    EGFR 82 11/14/2022      Lab Results   Component Value Date    CREATININE 1 01 11/14/2022    CREATININE 1 07 10/19/2022    CREATININE 1 05 10/13/2022    CREATININE 1 17 09/15/2022    CREATININE 0 71 05/26/2022    CREATININE 0 75 05/25/2022    CREATININE 0 82 05/24/2022    CREATININE 0 69 05/23/2022    CREATININE 0 72 05/22/2022    CREATININE 0 85 05/21/2022    CREATININE 0 77 05/21/2022    CREATININE 0 99 05/20/2022    CREATININE 1 08 05/20/2022    CREATININE 1 07 05/20/2022    CREATININE 0 67 04/28/2022      Lab Results   Component Value Date    COLORU Yellow 05/20/2022    CLARITYU Clear 05/20/2022    SPECGRAV 1 010 05/20/2022    PHUR 6 0 05/20/2022    LEUKOCYTESUR Negative 05/20/2022    NITRITE Negative 05/20/2022    PROTEIN UA Negative 05/20/2022    GLUCOSEU Negative 05/20/2022    KETONESU Negative 05/20/2022    UROBILINOGEN 0 2 05/20/2022    BILIRUBINUR Negative 05/20/2022    BLOODU Negative 05/20/2022    RBCUA None Seen 08/22/2020    WBCUA None Seen 08/22/2020    EPIS None Seen 08/22/2020    BACTERIA None Seen 08/22/2020      No results found for: LABPROT  No results found for: Manolo Rummage  Lab Results   Component Value Date    WBC 6 42 09/15/2022    HGB 12 2 09/15/2022    HCT 36 1 (L) 09/15/2022    MCV 93 09/15/2022     09/15/2022      Lab Results   Component Value Date    HGB 12 2 09/15/2022    HGB 7 1 (L) 05/26/2022    HGB 7 2 (L) 05/24/2022    HGB 7 2 (L) 05/23/2022    HGB 7 3 (L) 05/22/2022      Lab Results   Component Value Date    IRON 62 (L) 10/19/2022    TIBC 318 10/19/2022    FERRITIN 327 10/19/2022      No results found for: PTHCALCIUM, [id number], PHOSPHORUS   Lab Results   Component Value Date    CHOLESTEROL 141 09/15/2022    HDL 57 09/15/2022    LDLCALC 75 09/15/2022    TRIG 45 09/15/2022      Lab Results   Component Value Date    URICACID 2 4 (L) 08/01/2017      Lab Results   Component Value Date    HGBA1C 5 0 05/21/2022      Lab Results   Component Value Date    W6EMTNU 0 80 06/01/2018    FREET4 1 22 05/30/2018      Lab Results   Component Value Date    RAGHU Negative 07/27/2020      Lab Results   Component Value Date    PROT 7 1 12/31/2015    UPEP  03/11/2017     The urine total protein and electrophoresis are within normal limits  No monoclonal bands noted  Reviewed by: Mary Lino MD ([id number])  *Electronic Signature**        Portions of the record may have been created with voice recognition software  Occasional wrong word or "sound a like" substitutions may have occurred due to the inherent limitations of voice recognition software  Read the chart carefully and recognize, using context, where substitutions have occurred  If you have any questions, please contact the dictating provider

## 2023-01-30 NOTE — PATIENT INSTRUCTIONS
Stop taking lisinopril 10mg, this dose is too high and causing low blood pressure  Start taking lower dose lisinpril 2 5mg per day, prescription sent to pharmacy  Check blood and urine tests today  Will discuss results when available  Follow up in 2 months

## 2023-01-30 NOTE — PROGRESS NOTES
Daily Note     Today's date: 2023  Patient name: Ness Sy  : 1965  MRN: [id number]  Referring provider: Sarita Viera PA-C  Dx:   Encounter Diagnosis     ICD-10-CM    1  Atrophy of muscle of right shoulder  [id number]                      Subjective: Patient reports that his R shoulder is "there " He reports pain is still present but nothing he can't deal with  ROM still limited but improving, his strength is still limited at this time  Objective: See treatment diary below  Incorporated bent over rows, ext, hz abd today  Assessment: Tolerated treatment well  Patient demonstrated fatigue post treatment and would benefit from continued PT to improve R UE function  He still gets discomfort on all planes at end range motion  He has Jones pec tightness  His ROM has improved slightly  Strength is slowly improving  Bicep curls were performed while patient was performing LAQ to increase proprioception/cognition and multi task motions due to patient's PMH of falls  Plan: Continue per plan of care        Precautions: FALL RISK      Date    Manuals        PROM all planes CM KS CM KS CM   Assessment  KS                      Neuro Re-Ed        Ball Stability        Bilateral ER  nv      Standing TB Row/Ext L3 2x10 ea L3 2x10 L3 2x10 L2 2x10ea L3 2x10 ea   SA Punches Stick 2# 2x10 Stick 2# 2x10 Stick 2# 2x10 Stick 2x10 Stick 2x10   Bent over on wedge Row/Ext  nv 1# 3x10 ea     Bent over on wedge Hz abd   1# 15x     Ther Ex        Stick: FLX & ER 5"x20 ea 5"x20ea 5"x20 ea     Posterior Capsule Stretch    20"x4 ea    S/Lying ER/ABD 1# 2x10 ea  1# 2x10 ea 2x10ea 1# 2x10 ea   Standing TB ER L1/L2 2x10 L2 2x10 L3 3x10 L2 2x10 L2 2x10   Standing TB IR L3 2x10 L3 2x10 L3 3x10  L3 2x10   Pulleys 4' Scap 2' scap 4' scap  3' scap 3' scap   Bicep Curls  L3 3x10 5# 3x10     Tricep Ext  L3 3x10 L3 3x10     Nustep    L5 10'  UE ROM     Ther Activity        Over head lifts        Wall ball Push ups        Modalities        MHP   During PROM

## 2023-01-31 ENCOUNTER — TELEPHONE (OUTPATIENT)
Dept: OTHER | Facility: OTHER | Age: 58
End: 2023-01-31

## 2023-01-31 NOTE — PROGRESS NOTES
I spoke with patient and informed him that with a sodium of 119 he should be admitted to the hospital    He has a history of cirrhosis  His urine sodium was low so he should respond to albumin infusions or hypertonic saline  He appeared on the euvolemic side during my evaluation  Patient should stop taking his bumex and lisinopril until evaluated in the ER  Patient is attempting to get a ride ASAP, he may not be able to come in until tomorrow morning  I urged him sooner is better  I informed Dr Nataly Hennessy at [facility] ER of patient situation and need for above therapy

## 2023-01-31 NOTE — TELEPHONE ENCOUNTER
Patient called in stating he was advised by Dr Margoth Hughes (Nephrology) to go to the ER to be evaluated for low urine sodium  He is very limited with getting rides, so he was wondering if the gastroenterologist at the hospital could see him for his annual check up that he is due for to save him a trip  I informed patient I was unsure if they could do that, but that I would double check with the office just incase  Please call patient back today to discuss

## 2023-01-31 NOTE — TELEPHONE ENCOUNTER
Spoke with pt  He reported that his nephrology doctor is ok waiting for him to be seen in the am due to his transportation availability issues  Advised pt most likely won't be able to get an appointment tomorrow  Pt agreeable & verbalized understanding  Please call patient to schedule an office visit

## 2023-02-01 ENCOUNTER — APPOINTMENT (EMERGENCY)
Dept: RADIOLOGY | Facility: HOSPITAL | Age: 58
End: 2023-02-01

## 2023-02-01 ENCOUNTER — HOSPITAL ENCOUNTER (INPATIENT)
Facility: HOSPITAL | Age: 58
LOS: 2 days | Discharge: HOME | End: 2023-02-03
Attending: EMERGENCY MEDICINE | Admitting: INTERNAL MEDICINE

## 2023-02-01 DIAGNOSIS — K22.9 ESOPHAGEAL ABNORMALITY: ICD-10-CM

## 2023-02-01 DIAGNOSIS — E87.6 HYPOKALEMIA: ICD-10-CM

## 2023-02-01 DIAGNOSIS — E83.42 HYPOMAGNESEMIA: ICD-10-CM

## 2023-02-01 DIAGNOSIS — E87.1 HYPONATREMIA: Primary | ICD-10-CM

## 2023-02-01 DIAGNOSIS — K86.3 PANCREATIC PSEUDOCYST: ICD-10-CM

## 2023-02-01 LAB
ALBUMIN SERPL BCP-MCNC: 4.6 G/DL (ref 3.5–5)
ALP SERPL-CCNC: 95 U/L (ref 34–104)
ALT SERPL W P-5'-P-CCNC: 10 U/L (ref 7–52)
ANION GAP SERPL CALCULATED.3IONS-SCNC: 11 MMOL/L (ref 4–13)
ANION GAP SERPL CALCULATED.3IONS-SCNC: 6 MMOL/L (ref 4–13)
ANION GAP SERPL CALCULATED.3IONS-SCNC: 9 MMOL/L (ref 4–13)
AST SERPL W P-5'-P-CCNC: 24 U/L (ref 13–39)
BASOPHILS # BLD AUTO: 0.06 THOUSANDS/ÂΜL (ref 0–0.1)
BASOPHILS NFR BLD AUTO: 1 % (ref 0–1)
BILIRUB SERPL-MCNC: 0.42 MG/DL (ref 0.2–1)
BUN SERPL-MCNC: 22 MG/DL (ref 5–25)
BUN SERPL-MCNC: 23 MG/DL (ref 5–25)
BUN SERPL-MCNC: 24 MG/DL (ref 5–25)
CALCIUM SERPL-MCNC: 8.6 MG/DL (ref 8.4–10.2)
CALCIUM SERPL-MCNC: 8.7 MG/DL (ref 8.4–10.2)
CALCIUM SERPL-MCNC: 9.3 MG/DL (ref 8.4–10.2)
CHLORIDE SERPL-SCNC: 81 MMOL/L (ref 96–108)
CHLORIDE SERPL-SCNC: 87 MMOL/L (ref 96–108)
CHLORIDE SERPL-SCNC: 89 MMOL/L (ref 96–108)
CO2 SERPL-SCNC: 25 MMOL/L (ref 21–32)
CO2 SERPL-SCNC: 27 MMOL/L (ref 21–32)
CO2 SERPL-SCNC: 28 MMOL/L (ref 21–32)
CREAT SERPL-MCNC: 0.86 MG/DL (ref 0.6–1.3)
CREAT SERPL-MCNC: 0.98 MG/DL (ref 0.6–1.3)
CREAT SERPL-MCNC: 0.99 MG/DL (ref 0.6–1.3)
CREAT UR-MCNC: 47.6 MG/DL
EOSINOPHIL # BLD AUTO: 0.05 THOUSAND/ÂΜL (ref 0–0.61)
EOSINOPHIL NFR BLD AUTO: 1 % (ref 0–6)
ERYTHROCYTE [DISTWIDTH] IN BLOOD BY AUTOMATED COUNT: 13 % (ref 11.6–15.1)
GFR SERPL CREATININE-BSD FRML MDRD: 84 ML/MIN/1.73SQ M
GFR SERPL CREATININE-BSD FRML MDRD: 85 ML/MIN/1.73SQ M
GFR SERPL CREATININE-BSD FRML MDRD: 96 ML/MIN/1.73SQ M
GLUCOSE SERPL-MCNC: 103 MG/DL (ref 65–140)
GLUCOSE SERPL-MCNC: 196 MG/DL (ref 65–140)
GLUCOSE SERPL-MCNC: 53 MG/DL (ref 65–140)
GLUCOSE SERPL-MCNC: 82 MG/DL (ref 65–140)
GLUCOSE SERPL-MCNC: 95 MG/DL (ref 65–140)
HCT VFR BLD AUTO: 39.8 % (ref 36.5–49.3)
HGB BLD-MCNC: 14.4 G/DL (ref 12–17)
IMM GRANULOCYTES # BLD AUTO: 0.03 THOUSAND/UL (ref 0–0.2)
IMM GRANULOCYTES NFR BLD AUTO: 0 % (ref 0–2)
LYMPHOCYTES # BLD AUTO: 1.62 THOUSANDS/ÂΜL (ref 0.6–4.47)
LYMPHOCYTES NFR BLD AUTO: 20 % (ref 14–44)
MAGNESIUM SERPL-MCNC: 1.5 MG/DL (ref 1.9–2.7)
MCH RBC QN AUTO: 34.8 PG (ref 26.8–34.3)
MCHC RBC AUTO-ENTMCNC: 36.2 G/DL (ref 31.4–37.4)
MCV RBC AUTO: 96 FL (ref 82–98)
MONOCYTES # BLD AUTO: 0.93 THOUSAND/ÂΜL (ref 0.17–1.22)
MONOCYTES NFR BLD AUTO: 11 % (ref 4–12)
NEUTROPHILS # BLD AUTO: 5.63 THOUSANDS/ÂΜL (ref 1.85–7.62)
NEUTS SEG NFR BLD AUTO: 67 % (ref 43–75)
NRBC BLD AUTO-RTO: 0 /100 WBCS
OSMOLALITY UR: 325 MMOL/KG
PHOSPHATE SERPL-MCNC: 2.7 MG/DL (ref 2.7–4.5)
PLATELET # BLD AUTO: 242 THOUSANDS/UL (ref 149–390)
PMV BLD AUTO: 8.8 FL (ref 8.9–12.7)
POTASSIUM SERPL-SCNC: 3.3 MMOL/L (ref 3.5–5.3)
POTASSIUM SERPL-SCNC: 4.4 MMOL/L (ref 3.5–5.3)
POTASSIUM SERPL-SCNC: 4.6 MMOL/L (ref 3.5–5.3)
PROT SERPL-MCNC: 8.3 G/DL (ref 6.4–8.4)
RBC # BLD AUTO: 4.14 MILLION/UL (ref 3.88–5.62)
SODIUM 24H UR-SCNC: 17 MOL/L
SODIUM SERPL-SCNC: 119 MMOL/L (ref 135–147)
SODIUM SERPL-SCNC: 121 MMOL/L (ref 135–147)
SODIUM SERPL-SCNC: 123 MMOL/L (ref 135–147)
TSH SERPL DL<=0.05 MIU/L-ACNC: 1.68 UIU/ML (ref 0.45–4.5)
UUN 24H UR-MCNC: 460 MG/DL
WBC # BLD AUTO: 8.32 THOUSAND/UL (ref 4.31–10.16)

## 2023-02-01 RX ORDER — POTASSIUM CHLORIDE 20 MEQ/1
40 TABLET, EXTENDED RELEASE ORAL ONCE
Status: COMPLETED | OUTPATIENT
Start: 2023-02-01 | End: 2023-02-01

## 2023-02-01 RX ORDER — MELATONIN
1000 DAILY
Status: DISCONTINUED | OUTPATIENT
Start: 2023-02-01 | End: 2023-02-03 | Stop reason: HOSPADM

## 2023-02-01 RX ORDER — LANOLIN ALCOHOL/MO/W.PET/CERES
100 CREAM (GRAM) TOPICAL DAILY
Status: DISCONTINUED | OUTPATIENT
Start: 2023-02-01 | End: 2023-02-03 | Stop reason: HOSPADM

## 2023-02-01 RX ORDER — DOCUSATE SODIUM 100 MG/1
100 CAPSULE, LIQUID FILLED ORAL 2 TIMES DAILY PRN
Status: DISCONTINUED | OUTPATIENT
Start: 2023-02-01 | End: 2023-02-03 | Stop reason: HOSPADM

## 2023-02-01 RX ORDER — ONDANSETRON 2 MG/ML
4 INJECTION INTRAMUSCULAR; INTRAVENOUS EVERY 6 HOURS PRN
Status: DISCONTINUED | OUTPATIENT
Start: 2023-02-01 | End: 2023-02-03 | Stop reason: HOSPADM

## 2023-02-01 RX ORDER — DIPHENHYDRAMINE HCL 25 MG
25 TABLET ORAL EVERY 8 HOURS PRN
Status: DISCONTINUED | OUTPATIENT
Start: 2023-02-01 | End: 2023-02-03 | Stop reason: HOSPADM

## 2023-02-01 RX ORDER — LORAZEPAM 0.5 MG/1
0.5 TABLET ORAL
Status: DISCONTINUED | OUTPATIENT
Start: 2023-02-01 | End: 2023-02-03 | Stop reason: HOSPADM

## 2023-02-01 RX ORDER — HEPARIN SODIUM 5000 [USP'U]/ML
5000 INJECTION, SOLUTION INTRAVENOUS; SUBCUTANEOUS EVERY 8 HOURS SCHEDULED
Status: DISCONTINUED | OUTPATIENT
Start: 2023-02-01 | End: 2023-02-03 | Stop reason: HOSPADM

## 2023-02-01 RX ORDER — DIPHENHYDRAMINE HCL 25 MG
25 TABLET ORAL ONCE
Status: COMPLETED | OUTPATIENT
Start: 2023-02-01 | End: 2023-02-01

## 2023-02-01 RX ORDER — MAGNESIUM SULFATE HEPTAHYDRATE 40 MG/ML
2 INJECTION, SOLUTION INTRAVENOUS ONCE
Status: COMPLETED | OUTPATIENT
Start: 2023-02-01 | End: 2023-02-01

## 2023-02-01 RX ORDER — FOLIC ACID 1 MG/1
1 TABLET ORAL DAILY
Status: DISCONTINUED | OUTPATIENT
Start: 2023-02-01 | End: 2023-02-03 | Stop reason: HOSPADM

## 2023-02-01 RX ORDER — BUMETANIDE 1 MG/1
1 TABLET ORAL DAILY
Status: DISCONTINUED | OUTPATIENT
Start: 2023-02-01 | End: 2023-02-01

## 2023-02-01 RX ORDER — SODIUM CHLORIDE 9 MG/ML
75 INJECTION, SOLUTION INTRAVENOUS CONTINUOUS
Status: DISCONTINUED | OUTPATIENT
Start: 2023-02-01 | End: 2023-02-01

## 2023-02-01 RX ORDER — LEVETIRACETAM 500 MG/1
1000 TABLET ORAL EVERY 12 HOURS SCHEDULED
Status: DISCONTINUED | OUTPATIENT
Start: 2023-02-01 | End: 2023-02-03 | Stop reason: HOSPADM

## 2023-02-01 RX ORDER — NICOTINE 21 MG/24HR
1 PATCH, TRANSDERMAL 24 HOURS TRANSDERMAL DAILY
Status: DISCONTINUED | OUTPATIENT
Start: 2023-02-01 | End: 2023-02-03 | Stop reason: HOSPADM

## 2023-02-01 RX ADMIN — MULTIPLE VITAMINS W/ MINERALS TAB 1 TABLET: TAB at 11:23

## 2023-02-01 RX ADMIN — LEVETIRACETAM 1000 MG: 500 TABLET, FILM COATED ORAL at 11:23

## 2023-02-01 RX ADMIN — HEPARIN SODIUM 5000 UNITS: 5000 INJECTION INTRAVENOUS; SUBCUTANEOUS at 21:49

## 2023-02-01 RX ADMIN — THIAMINE HCL TAB 100 MG 100 MG: 100 TAB at 11:23

## 2023-02-01 RX ADMIN — LORAZEPAM 0.5 MG: 0.5 TABLET ORAL at 21:49

## 2023-02-01 RX ADMIN — MAGNESIUM SULFATE HEPTAHYDRATE 2 G: 40 INJECTION, SOLUTION INTRAVENOUS at 12:33

## 2023-02-01 RX ADMIN — FOLIC ACID 1 MG: 1 TABLET ORAL at 11:23

## 2023-02-01 RX ADMIN — DIPHENHYDRAMINE HYDROCHLORIDE 25 MG: 25 TABLET ORAL at 07:32

## 2023-02-01 RX ADMIN — POTASSIUM CHLORIDE 40 MEQ: 1500 TABLET, EXTENDED RELEASE ORAL at 11:23

## 2023-02-01 RX ADMIN — POTASSIUM CHLORIDE 30 MEQ: 750 TABLET, EXTENDED RELEASE ORAL at 21:49

## 2023-02-01 RX ADMIN — SODIUM CHLORIDE 75 ML/HR: 0.9 INJECTION, SOLUTION INTRAVENOUS at 12:33

## 2023-02-01 RX ADMIN — Medication 100 MCG: at 11:23

## 2023-02-01 RX ADMIN — DIPHENHYDRAMINE HYDROCHLORIDE 25 MG: 25 TABLET ORAL at 18:31

## 2023-02-01 RX ADMIN — LEVETIRACETAM 1000 MG: 500 TABLET, FILM COATED ORAL at 21:49

## 2023-02-01 RX ADMIN — HEPARIN SODIUM 5000 UNITS: 5000 INJECTION INTRAVENOUS; SUBCUTANEOUS at 13:00

## 2023-02-01 RX ADMIN — CHOLECALCIFEROL TAB 25 MCG (1000 UNIT) 1000 UNITS: 25 TAB at 11:24

## 2023-02-01 NOTE — ED ATTENDING ATTESTATION
2/1/2023  ILieutenant Ryan DO, saw and evaluated the patient  I have discussed the patient with the resident/non-physician practitioner and agree with the resident's/non-physician practitioner's findings, Plan of Care, and MDM as documented in the resident's/non-physician practitioner's note, except where noted  All available labs and Radiology studies were reviewed  I was present for key portions of any procedure(s) performed by the resident/non-physician practitioner and I was immediately available to provide assistance  At this point I agree with the current assessment done in the Emergency Department  I have conducted an independent evaluation of this patient a history and physical is as follows:    71-year-old male presents for hyponatremia  Patient has history of such, follows with nephrology, outpatient labs showed downtrending sodium since October 2022, BMP performed on 1/30/2023 with sodium of 119, repeat this morning a sodium of 119  Per note from yesterday, nephrology would like patient to be admitted  Patient is asymptomatic, states he has been adhering to fluid restriction  A/p: pt with asymptomatic hyponatremia, however significant at   119  Patient previously required several days inpatient stay to correct and nephro recs admission  Pt was ordered BMP prior to my arrival, will add-on urine studies and discuss with SLIM  Physical Exam  Vitals reviewed  Constitutional:       General: He is not in acute distress  Appearance: Normal appearance  He is not toxic-appearing or diaphoretic  HENT:      Head: Normocephalic and atraumatic  Right Ear: External ear normal       Left Ear: External ear normal    Eyes:      General:         Right eye: No discharge  Left eye: No discharge  Extraocular Movements: Extraocular movements intact  Cardiovascular:      Rate and Rhythm: Normal rate  Pulmonary:      Effort: Pulmonary effort is normal  No respiratory distress  Musculoskeletal:         General: No deformity or signs of injury  Right lower leg: No edema  Left lower leg: No edema  Skin:     General: Skin is warm  Coloration: Skin is not jaundiced or pale  Neurological:      General: No focal deficit present  Mental Status: He is alert  Mental status is at baseline                 ED Course         Critical Care Time  Procedures

## 2023-02-01 NOTE — ED PROVIDER NOTES
History  Chief Complaint   Patient presents with   • Medical Problem     Pt states he was sent here by his doctor for low sodium levels  60-year-old male with alcoholic cirrhosis, alcoholic and electrolyte abnormality induced seizures on Keppra, chronic hyponatremia previously attributed to adrenal insufficiency presenting to the ED due to hyponatremia with serum sodium level of 119 yesterday  Repeat sodium today: 119  Patient was advised by his nephrologist to come to the ED for admission for albumin infusions or hypertonic saline  Patient's only complaint at this time is having an itchy back, says he has this at baseline and Benadryl is the only thing which helps  Patient's last seizure was in May, was told by his nephrologist to stop taking Keppra, patient did not take any Keppra today  Patient was also told by nephrology to stop taking Bumex and lisinopril until he is evaluated in the ED  Patient states he drinks about 12 ounces of water per day  Patient denies any episodes of she seizure, confusion, AMS, N/V/D, falls, difficulty concentrating, headache, gait disturbances, chest pain, shortness of breath, abdominal pain, urinary symptoms, URI symptoms, fever or chills  Prior to Admission Medications   Prescriptions Last Dose Informant Patient Reported? Taking?    Cholecalciferol 25 MCG (1000 UT) tablet 1/31/2023  No Yes   Sig: Take 1 tablet (1,000 Units total) by mouth daily   Diclofenac Sodium (VOLTAREN) 1 % 1/31/2023  No Yes   Sig: Apply 2 g topically 4 (four) times a day   LORazepam (ATIVAN) 0 5 mg tablet 1/31/2023  No Yes   Sig: Take 1 tablet (0 5 mg total) by mouth daily at bedtime   Multiple Vitamin (TAB-A-BONI PO) 1/31/2023  Yes Yes   Sig: Take 1 tablet by mouth daily   bumetanide (BUMEX) 1 mg tablet 1/31/2023  No Yes   Sig: Take 1 tablet (1 mg total) by mouth daily   cyanocobalamin (VITAMIN B-12) 100 mcg tablet 1/31/2023  No Yes   Sig: Take 1 tablet (100 mcg total) by mouth daily docusate sodium (COLACE) 100 mg capsule 1/31/2023  No Yes   Sig: Take 1 capsule (100 mg total) by mouth 2 (two) times a day as needed for constipation   ergocalciferol (VITAMIN D2) 50,000 units 1/31/2023  No Yes   Sig: Take 1 capsule (50,000 Units total) by mouth once a week   folic acid (FOLVITE) 1 mg tablet 1/31/2023  No Yes   Sig: Take 1 tablet (1 mg total) by mouth daily   levETIRAcetam (KEPPRA) 1000 MG tablet 1/31/2023  No Yes   Sig: Take 1 tablet (1,000 mg total) by mouth every 12 (twelve) hours   lisinopril (ZESTRIL) 2 5 mg tablet 1/31/2023  No Yes   Sig: Take 1 tablet (2 5 mg total) by mouth daily   multivitamin [facility]) TABS 1/31/2023  Yes Yes   Sig: Take 1 tablet by mouth daily   thiamine 100 MG tablet 1/31/2023  No Yes   Sig: Take 1 tablet (100 mg total) by mouth daily      Facility-Administered Medications: None       Past Medical History:   Diagnosis Date   • Alcohol abuse    • Traore esophagus    • Bowel obstruction (HCC)    • Bowel perforation (HCC)    • Cardiac disease    • Continuous chronic alcoholism ([facility] [address] ) 10/5/2017   • COPD (chronic obstructive pulmonary disease) ([facility] [address] )    • History of shoulder surgery     Right shoulder   • History of transfusion    • Hx of cervical spine surgery    • Hypertension    • Incisional hernia 10/8/2017   • MI, old    • Mitral regurgitation    • Psychiatric disorder    • Seizures ([facility] [address] )        Past Surgical History:   Procedure Laterality Date   • APPENDECTOMY     • BACK SURGERY     • CHOLECYSTECTOMY     • ESOPHAGOGASTRODUODENOSCOPY N/A 11/28/2016    Procedure: ESOPHAGOGASTRODUODENOSCOPY (EGD); Surgeon: Chitra Rider MD;  Location: [facility];   Service:    • GALLBLADDER SURGERY     • LAPAROTOMY N/A 10/25/2016    Procedure: LAPAROTOMY EXPLORATORY;  Surgeon: Francie Jimenez MD;  Location: [facility];  Service:    • MOUTH SURGERY     • PERCUTANEOUS PINNING FEMORAL NECK FRACTURE     • SHOULDER SURGERY Right    • SHOULDER SURGERY     • SMALL INTESTINE SURGERY • STOMACH SURGERY      bal surgery       Family History   Problem Relation Age of Onset   • Breast cancer Mother    • Prostate cancer Father    • Skin cancer Brother      I have reviewed and agree with the history as documented  E-Cigarette/Vaping   • E-Cigarette Use Former User      E-Cigarette/Vaping Substances   • Nicotine No    • THC No    • CBD No    • Flavoring No    • Other No    • Unknown No      Social History     Tobacco Use   • Smoking status: Every Day     Packs/day: 3 00     Years: 35 00     Pack years: 105 00     Types: Cigarettes   • Smokeless tobacco: Never   Vaping Use   • Vaping Use: Former   Substance Use Topics   • Alcohol use: Yes     Alcohol/week: 42 0 standard drinks     Types: 42 Cans of beer per week     Comment: a six pack a day   • Drug use: No        Review of Systems   Constitutional: Negative for chills and fever  Hyponatremia   HENT: Negative for ear pain and sore throat  Eyes: Negative for pain and visual disturbance  Respiratory: Negative for cough and shortness of breath  Cardiovascular: Negative for chest pain and palpitations  Gastrointestinal: Negative for abdominal pain and vomiting  Genitourinary: Negative for dysuria and hematuria  Musculoskeletal: Negative for arthralgias and back pain  Skin: Negative for color change and rash  Pruritic back   Neurological: Negative for seizures and syncope  All other systems reviewed and are negative  Physical Exam  ED Triage Vitals [02/01/23 0616]   Temperature Pulse Respirations Blood Pressure SpO2   98 4 °F (36 9 °C) 87 16 116/74 98 %      Temp Source Heart Rate Source Patient Position - Orthostatic VS BP Location FiO2 (%)   Tympanic Monitor Lying Left arm --      Pain Score       No Pain             Orthostatic Vital Signs  Vitals:    02/01/23 0616 02/01/23 1107   BP: 116/74 97/68   Pulse: 87 77   Patient Position - Orthostatic VS: Lying        Physical Exam  Vitals and nursing note reviewed  Constitutional:       General: He is not in acute distress  Appearance: He is well-developed  HENT:      Head: Normocephalic and atraumatic  Eyes:      Conjunctiva/sclera: Conjunctivae normal    Cardiovascular:      Rate and Rhythm: Normal rate and regular rhythm  Pulses: Normal pulses  Radial pulses are 2+ on the right side and 2+ on the left side  Dorsalis pedis pulses are 2+ on the right side and 2+ on the left side  Heart sounds: Normal heart sounds, S1 normal and S2 normal  No murmur heard  Comments: Patient appears euvolemic on exam   Pulmonary:      Effort: Pulmonary effort is normal  No respiratory distress  Breath sounds: Normal breath sounds and air entry  Abdominal:      Palpations: Abdomen is soft  Tenderness: There is no abdominal tenderness  Comments: Soft, nontender, nonperitoneal    Musculoskeletal:         General: No swelling  Cervical back: Neck supple  Right lower leg: No edema  Left lower leg: No edema  Skin:     General: Skin is warm and dry  Capillary Refill: Capillary refill takes less than 2 seconds  Neurological:      Mental Status: He is alert and oriented to person, place, and time  GCS: GCS eye subscore is 4  GCS verbal subscore is 5  GCS motor subscore is 6  Cranial Nerves: Cranial nerves 2-12 are intact  Sensory: Sensation is intact  Motor: Motor function is intact  Coordination: Coordination is intact  Gait: Gait is intact     Psychiatric:         Mood and Affect: Mood normal          ED Medications  Medications   cholecalciferol (VITAMIN D3) tablet 1,000 Units (1,000 Units Oral Given 2/1/23 1124)   cyanocobalamin (VITAMIN B-12) tablet 100 mcg (100 mcg Oral Given 0/0/28 6586)   folic acid (FOLVITE) tablet 1 mg (1 mg Oral Given 2/1/23 1123)   levETIRAcetam (KEPPRA) tablet 1,000 mg (1,000 mg Oral Given 2/1/23 1123)   LORazepam (ATIVAN) tablet 0 5 mg (has no administration in time range)   multivitamin-minerals (CENTRUM) tablet 1 tablet (1 tablet Oral Given 2/1/23 1123)   thiamine tablet 100 mg (100 mg Oral Given 2/1/23 1123)   docusate sodium (COLACE) capsule 100 mg (has no administration in time range)   ondansetron (ZOFRAN) injection 4 mg (has no administration in time range)   nicotine (NICODERM CQ) 14 mg/24hr TD 24 hr patch 1 patch (1 patch Transdermal Not Given 2/1/23 1124)   potassium chloride (K-DUR,KLOR-CON) CR tablet 30 mEq (has no administration in time range)   heparin (porcine) subcutaneous injection 5,000 Units (5,000 Units Subcutaneous Given 2/1/23 1300)   magnesium sulfate 2 g/50 mL IVPB (premix) 2 g (2 g Intravenous New Bag 2/1/23 1233)   sodium chloride 0 9 % infusion (75 mL/hr Intravenous New Bag 2/1/23 1233)   diphenhydrAMINE (BENADRYL) tablet 25 mg (25 mg Oral Given 2/1/23 0732)   potassium chloride (K-DUR,KLOR-CON) CR tablet 40 mEq (40 mEq Oral Given 2/1/23 1123)       Diagnostic Studies  Results Reviewed     Procedure Component Value Units Date/Time    Sodium, urine, random [[id number]] Collected: 02/01/23 0845    Lab Status: Final result Specimen: Urine, Clean Catch Updated: 02/01/23 0903     Sodium, Ur 17    TSH, 3rd generation with Free T4 reflex [[id number]]  (Normal) Collected: 02/01/23 0626    Lab Status: Final result Specimen: Blood Updated: 02/01/23 0851     TSH 3RD GENERATON 1 678 uIU/mL     Narrative:      Patients undergoing fluorescein dye angiography may retain small amounts of fluorescein in the body for 48-72 hours post procedure  Samples containing fluorescein can produce falsely depressed TSH values  If the patient had this procedure,a specimen should be resubmitted post fluorescein clearance  Osmolality, urine [[id number]] Collected: 02/01/23 0845    Lab Status:  In process Specimen: Urine, Clean Catch Updated: 02/01/23 0847    Magnesium [[id number]]  (Abnormal) Collected: 02/01/23 0626    Lab Status: Final result Specimen: Blood Updated: 02/01/23 0744     Magnesium 1 5 mg/dL     Phosphorus [[id number]]  (Normal) Collected: 02/01/23 0626    Lab Status: Final result Specimen: Blood Updated: 02/01/23 0744     Phosphorus 2 7 mg/dL     Comprehensive metabolic panel [[id number]]  (Abnormal) Collected: 02/01/23 0626    Lab Status: Final result Specimen: Blood from Arm, Right Updated: 02/01/23 4946     Sodium 119 mmol/L      Potassium 3 3 mmol/L      Chloride 81 mmol/L      CO2 27 mmol/L      ANION GAP 11 mmol/L      BUN 22 mg/dL      Creatinine 0 86 mg/dL      Glucose 53 mg/dL      Calcium 9 3 mg/dL      AST 24 U/L      ALT 10 U/L      Alkaline Phosphatase 95 U/L      Total Protein 8 3 g/dL      Albumin 4 6 g/dL      Total Bilirubin 0 42 mg/dL      eGFR 96 ml/min/1 73sq m     Narrative:      National Kid[employer] guidelines for Chronic Kidney Disease (CKD):   •  Stage 1 with normal or high GFR (GFR > 90 mL/min/1 73 square meters)  •  Stage 2 Mild CKD (GFR = 60-89 mL/min/1 73 square meters)  •  Stage 3A Moderate CKD (GFR = 45-59 mL/min/1 73 square meters)  •  Stage 3B Moderate CKD (GFR = 30-44 mL/min/1 73 square meters)  •  Stage 4 Severe CKD (GFR = 15-29 mL/min/1 73 square meters)  •  Stage 5 End Stage CKD (GFR <15 mL/min/1 73 square meters)  Note: GFR calculation is accurate only with a steady state creatinine    CBC and differential [[id number]]  (Abnormal) Collected: 02/01/23 0626    Lab Status: Final result Specimen: Blood from Arm, Right Updated: 02/01/23 0643     WBC 8 32 Thousand/uL      RBC 4 14 Million/uL      Hemoglobin 14 4 g/dL      Hematocrit 39 8 %      MCV 96 fL      MCH 34 8 pg      MCHC 36 2 g/dL      RDW 13 0 %      MPV 8 8 fL      Platelets 882 Thousands/uL      nRBC 0 /100 WBCs      Neutrophils Relative 67 %      Immat GRANS % 0 %      Lymphocytes Relative 20 %      Monocytes Relative 11 %      Eosinophils Relative 1 %      Basophils Relative 1 %      Neutrophils Absolute 5 63 Thousands/µL      Immature Grans Absolute 0 03 Thousand/uL Lymphocytes Absolute 1 62 Thousands/µL      Monocytes Absolute 0 93 Thousand/µL      Eosinophils Absolute 0 05 Thousand/µL      Basophils Absolute 0 06 Thousands/µL                  XR chest 1 view portable    (Results Pending)         Procedures  Procedures      ED Course  ED Course as of 02/01/23 1425   Wed Feb 01, 2023   0750 MIA (Dr Stacey Daniels) texted for admission   2368 Pt accepted to inpatient admission  Inpatient consult to nephro placed as requested by SLIM   0822 Sodium(!): 119   0822 Potassium(!): 3 3   0822 Magnesium(!): 1 5                                       Medical Decision Making  41-year-old male presenting to the ED due to sodium level of 119 yesterday, repeat sodium today = 119  Patient was told to go to the ED for admission for possible albumin or hypertonic saline infusions  Patient's only complaint upon ED arrival today is an itchy back which he has at baseline, it improves with Benadryl  Patient was given Benadryl 25 mg in the ED  On exam, patient appears euvolemic, is also asymptomatic  Labs notable for hypomagnesemia, hypokalemia  Differential diagnoses include beer potomania, glucocorticoid deficiency, hypothyroidism, SIADH, psychogenic polydipsia  Case discussed with SLIM, pt admitted to inpatient  By time of admission, pending urine sodium, urine osmolarity, TSH  Hypokalemia: acute illness or injury  Hypomagnesemia: acute illness or injury  Hyponatremia: chronic illness or injury that poses a threat to life or bodily functions  Amount and/or Complexity of Data Reviewed  Independent Historian:      Details: HPI obtained from patient  External Data Reviewed: labs and notes  Details: Reviewed nephrology notes  Labs: ordered  Decision-making details documented in ED Course  Radiology: ordered  Risk  OTC drugs  Decision regarding hospitalization              Disposition  Final diagnoses:   Hyponatremia   Hypokalemia   Hypomagnesemia     Time reflects when diagnosis was documented in both MDM as applicable and the Disposition within this note     Time User Action Codes Description Comment    2/1/2023  7:54 AM Xuan Bud Add [E87 1] Hyponatremia     2/1/2023  7:54 AM Xuan Bud Add [E87 6] Hypokalemia     2/1/2023  7:54 AM Xuan Bud Add [E83 42] Hypomagnesemia       ED Disposition     ED Disposition   Admit    Condition   Stable    Date/Time   Wed Feb 1, 2023  8:04 AM    Comment   Case was discussed with Dr Migdalia Hook and the patient's admission status was agreed to be Admission Status: inpatient status to the service of Dr Migdalia Hook              Follow-up Information    None         Current Discharge Medication List      CONTINUE these medications which have NOT CHANGED    Details   bumetanide (BUMEX) 1 mg tablet Take 1 tablet (1 mg total) by mouth daily  Qty: 30 tablet, Refills: 2    Associated Diagnoses: Fluid retention      Cholecalciferol 25 MCG (1000 UT) tablet Take 1 tablet (1,000 Units total) by mouth daily  Qty: 30 tablet, Refills: 0    Associated Diagnoses: Alcohol abuse      cyanocobalamin (VITAMIN B-12) 100 mcg tablet Take 1 tablet (100 mcg total) by mouth daily  Qty: 30 tablet, Refills: 5    Associated Diagnoses: Alcohol abuse      Diclofenac Sodium (VOLTAREN) 1 % Apply 2 g topically 4 (four) times a day  Qty: 100 g, Refills: 1    Associated Diagnoses: Left medial knee pain      docusate sodium (COLACE) 100 mg capsule Take 1 capsule (100 mg total) by mouth 2 (two) times a day as needed for constipation  Qty: 30 capsule, Refills: 0    Associated Diagnoses: Constipation, unspecified constipation type      ergocalciferol (VITAMIN D2) 50,000 units Take 1 capsule (50,000 Units total) by mouth once a week  Qty: 13 capsule, Refills: 1    Associated Diagnoses: Vitamin D deficiency      folic acid (FOLVITE) 1 mg tablet Take 1 tablet (1 mg total) by mouth daily  Qty: 30 tablet, Refills: 0    Associated Diagnoses: Folic acid deficiency      levETIRAcetam (KEPPRA) 1000 MG tablet Take 1 tablet (1,000 mg total) by mouth every 12 (twelve) hours  Qty: 60 tablet, Refills: 2    Associated Diagnoses: Cervical disc syndrome; Seizure disorder (HCC)      lisinopril (ZESTRIL) 2 5 mg tablet Take 1 tablet (2 5 mg total) by mouth daily  Qty: 90 tablet, Refills: 2    Associated Diagnoses: Chronic hyponatremia      LORazepam (ATIVAN) 0 5 mg tablet Take 1 tablet (0 5 mg total) by mouth daily at bedtime  Qty: 30 tablet, Refills: 0    Associated Diagnoses: Insomnia, unspecified type      !! Multiple Vitamin (TAB-A-BONI PO) Take 1 tablet by mouth daily      !! multivitamin (THERAGRAN) TABS Take 1 tablet by mouth daily      thiamine 100 MG tablet Take 1 tablet (100 mg total) by mouth daily  Qty: 30 tablet, Refills: 0    Associated Diagnoses: Alcohol abuse       !! - Potential duplicate medications found  Please discuss with provider  No discharge procedures on file  PDMP Review       Value Time User    PDMP Reviewed  Yes 12/15/2022  8:36 AM Alissa Velasquez DO           ED Provider  Attending physically available and evaluated Gaby Jimenez  I managed the patient along with the ED Attending      Electronically Signed by         Brittnee Wilson MD  02/01/23 6153

## 2023-02-01 NOTE — H&P
[address] [facility] [address]  H&P- Vanna Mcdaniel 1965, 65 y o  male MRN: [id number]  Unit/Bed#: 406-01 Encounter: [id number]  Primary Care Provider: Suzanne Reyes DO   Date and time admitted to hospital: 2/1/2023  6:14 AM    * Hyponatremia  Assessment & Plan  History of chronic hyponatremia, currently worsened  Sodium of 119 at time of presentation, asymptomatic  Baseline appears to be between 120-125, with a goal of 125-129 according to last nephrology note  Previously attributed to adrenal insufficiency, this was ruled out by endocrinology with patient having a normal fasting cortisol and ACTH levels off steroids  Etiology potentially insufficient solute/Beer Potomania v cirrhosis  Patient does endorse noncompliance with fluid restriction  Will fluid restrict for now  Continue home Bumex pending nephrology evaluation  Patient was intolerant of salt tablets (nausea and vomiting)  Nephrology consult pending  Alcoholic cirrhosis of liver without ascites ([facility] [address] )  Assessment & Plan  History noted  Patient reports alcohol in remission, currently drinking up to 6 pack of nonalcoholic beers daily  Hx of seizure disorder  Assessment & Plan  Prior episodes of seizures with normal EEGs  Potentially provoked by EtOH abuse according to neurology  Continue Keppra  VTE Pharmacologic Prophylaxis: VTE Score: 1 Moderate Risk (Score 3-4) - Pharmacological DVT Prophylaxis Ordered: heparin  Code Status: Level 1 - Full Code     Anticipated Length of Stay: Patient will be admitted on an inpatient basis with an anticipated length of stay of greater than 2 midnights secondary to Evaluation and treatment of hyponatremia       Total Time for Visit, including Counseling / Coordination of Care: 45 minutes Greater than 50% of this total time spent on direct patient counseling and coordination of care      Chief Complaint: Abnormal labs    History of Present Illness:  Vanna Mcdaniel is a 65 y o  male with a PMH of chronic hyponatremia and cirrhosis who presents with noted low sodium on outpatient labs  Mr Dov Boland has a past medical history significant for alcoholic cirrhosis, reported seizure disorder which may have been secondary to prior EtOH abuse, HTN, prior iron deficiency anemia with a normal hemoglobin currently, and osteoporosis  The patient also has a history of chronic hyponatremia - this was previously attributed to adrenal insufficiency, ruled out with a normal fasting cortisol and ACTH level off steroids  Nephrology believes etiology may be patient's underlying cirrhosis, insufficient solute intake  The patient's reports being intolerant of sodium tablets, and also admits to a degree of noncompliance with fluid restriction  Outpatient labs returned with a sodium that was low at 119, with the patient referred to the ED for admission for further evaluation and treatment  Review of Systems:  Review of Systems   Constitutional: Negative for appetite change, chills, fatigue and fever  Respiratory: Negative for shortness of breath  Cardiovascular: Negative for chest pain and leg swelling  Gastrointestinal: Negative for abdominal distention and abdominal pain  All other systems reviewed and are negative        Past Medical and Surgical History:   Past Medical History:   Diagnosis Date   • Alcohol abuse    • Traore esophagus    • Bowel obstruction (HCC)    • Bowel perforation ([address] )    • Cardiac disease    • Continuous chronic alcoholism ([address] ) 10/5/2017   • COPD (chronic obstructive pulmonary disease) (HCC)    • History of shoulder surgery     Right shoulder   • History of transfusion    • Hx of cervical spine surgery    • Hypertension    • Incisional hernia 10/8/2017   • MI, old    • Mitral regurgitation    • Psychiatric disorder    • Seizures ([address] )        Past Surgical History:   Procedure Laterality Date   • APPENDECTOMY     • BACK SURGERY     • CHOLECYSTECTOMY     • ESOPHAGOGASTRODUODENOSCOPY N/A 11/28/2016    Procedure: ESOPHAGOGASTRODUODENOSCOPY (EGD); Surgeon: Nba Melara MD;  Location: [facility]; Service:    • GALLBLADDER SURGERY     • LAPAROTOMY N/A 10/25/2016    Procedure: Lexx Mann;  Surgeon: Lb Mclean MD;  Location: [facility];  Service:    • MOUTH SURGERY     • PERCUTANEOUS PINNING FEMORAL NECK FRACTURE     • SHOULDER SURGERY Right    • SHOULDER SURGERY     • SMALL INTESTINE SURGERY     • STOMACH SURGERY      bal surgery       Meds/Allergies:  Prior to Admission medications    Medication Sig Start Date End Date Taking?  Authorizing Provider   bumetanide (BUMEX) 1 mg tablet Take 1 tablet (1 mg total) by mouth daily 11/24/22  Yes Rivera Haile, DO   Cholecalciferol 25 MCG (1000 UT) tablet Take 1 tablet (1,000 Units total) by mouth daily 8/29/20  Yes Julee Salinas, DO   cyanocobalamin (VITAMIN B-12) 100 mcg tablet Take 1 tablet (100 mcg total) by mouth daily 7/17/18  Yes Madalyn Smith PA-C   Diclofenac Sodium (VOLTAREN) 1 % Apply 2 g topically 4 (four) times a day 9/26/22  Yes Rivera Fraction, DO   docusate sodium (COLACE) 100 mg capsule Take 1 capsule (100 mg total) by mouth 2 (two) times a day as needed for constipation 8/29/20  Yes Julee Salinas,    ergocalciferol (VITAMIN D2) 50,000 units Take 1 capsule (50,000 Units total) by mouth once a week 12/12/22  Yes LORA Velasquez   folic acid (FOLVITE) 1 mg tablet Take 1 tablet (1 mg total) by mouth daily 8/29/20  Yes Julee Salinas DO   levETIRAcetam (KEPPRA) 1000 MG tablet Take 1 tablet (1,000 mg total) by mouth every 12 (twelve) hours 8/22/22  Yes Rivera Fraction, DO   lisinopril (ZESTRIL) 2 5 mg tablet Take 1 tablet (2 5 mg total) by mouth daily 1/30/23  Yes Ayush Murphy, DO   LORazepam (ATIVAN) 0 5 mg tablet Take 1 tablet (0 5 mg total) by mouth daily at bedtime 12/15/22  Yes Rivera Fraction, DO   Multiple Vitamin (TAB-A-BONI PO) Take 1 tablet by mouth daily   Yes Historical Provider, MD   multivitamin [facility]) TABS Take 1 tablet by mouth daily 7/13/22 7/13/23 Yes Historical Provider, MD   thiamine 100 MG tablet Take 1 tablet (100 mg total) by mouth daily 8/29/20  Yes Cristo Salinas DO   albuterol (PROVENTIL HFA,VENTOLIN HFA) 90 mcg/act inhaler Inhale 2 puffs every 4 (four) hours as needed for wheezing or shortness of breath 4/22/22 2/1/23  LORA Clancy   dextromethorphan-guaifenesin [facility]\"" DM)  MG per 12 hr tablet Take 1 tablet by mouth every 12 (twelve) hours 5/11/21 2/1/23  Eliot Vargas PA-C   esomeprazole (NexIUM) 40 MG capsule Take 1 capsule (40 mg total) by mouth 2 (two) times a day before meals 10/28/20 2/1/23  Ger Guerin PA-C   lactulose [facility]) 10 g/15 mL solution  7/13/22 2/1/23  Historical Provider, MD   loratadine (Claritin Reditabs) 10 MG dissolvable tablet Take 10 mg by mouth daily  2/1/23  Historical Provider, MD   pancrelipase, Lip-Prot-Amyl, (CREON) 6,000 units delayed release capsule Take 6,000 units of lipase by mouth 3 (three) times a day with meals 10/28/20 2/1/23  Ger Guerin PA-C   polyethylene glycol (GOLYTELY) 4000 mL solution Take 4,000 mL by mouth once for 1 dose  Patient not taking: No sig reported 3/9/21 2/1/23  Sherri Pena MD     I have reviewed home medications using recent Epic encounter      Allergies: No Known Allergies    Social History:  Marital Status: Single   Occupation: On disability  Patient Pre-hospital Living Situation: Home  Patient Pre-hospital Level of Mobility: walks  Patient Pre-hospital Diet Restrictions: Fluid restriction  Substance Use History:   Social History     Substance and Sexual Activity   Alcohol Use Yes   • Alcohol/week: 42 0 standard drinks   • Types: 42 Cans of beer per week    Comment: a six pack a day     Social History     Tobacco Use   Smoking Status Every Day   • Packs/day: 3 00   • Years: 35 00   • Pack years: 105 00   • Types: Cigarettes   Smokeless Tobacco Never Social History     Substance and Sexual Activity   Drug Use No       Family History:  Family History   Problem Relation Age of Onset   • Breast cancer Mother    • Prostate cancer Father    • Skin cancer Brother        Physical Exam:     Vitals:   Blood Pressure: 97/68 (02/01/23 1107)  Pulse: 77 (02/01/23 1107)  Temperature: 98 4 °F (36 9 °C) (02/01/23 1107)  Temp Source: Tympanic (02/01/23 0616)  Respirations: 18 (02/01/23 1107)  Height: 5' 4" (162 6 cm) (02/01/23 1103)  Weight - Scale: 48 7 kg (107 lb 5 8 oz) (02/01/23 1103)  SpO2: 94 % (02/01/23 1107)    Physical Exam  Vitals and nursing note reviewed  Constitutional:       General: He is not in acute distress  Appearance: He is well-developed  He is not ill-appearing or toxic-appearing  Comments: Thin and frail in appearance  Cardiovascular:      Rate and Rhythm: Normal rate and regular rhythm  Heart sounds: No murmur heard  Pulmonary:      Effort: Pulmonary effort is normal  No respiratory distress  Breath sounds: Normal breath sounds  No wheezing or rales  Abdominal:      General: Bowel sounds are normal  There is no distension  Palpations: Abdomen is soft  Tenderness: There is no abdominal tenderness  There is no guarding or rebound  Musculoskeletal:         General: No swelling or tenderness  Cervical back: Neck supple  Skin:     General: Skin is warm and dry  Neurological:      General: No focal deficit present  Mental Status: He is alert and oriented to person, place, and time     Psychiatric:         Mood and Affect: Mood normal          Behavior: Behavior normal        Additional Data:     Lab Results:  Results from last 7 days   Lab Units 02/01/23  0626   WBC Thousand/uL 8 32   HEMOGLOBIN g/dL 14 4   HEMATOCRIT % 39 8   PLATELETS Thousands/uL 242   NEUTROS PCT % 67   LYMPHS PCT % 20   MONOS PCT % 11   EOS PCT % 1     Results from last 7 days   Lab Units 02/01/23  0626   SODIUM mmol/L 119*   POTASSIUM mmol/L 3 3*   CHLORIDE mmol/L 81*   CO2 mmol/L 27   BUN mg/dL 22   CREATININE mg/dL 0 86   ANION GAP mmol/L 11   CALCIUM mg/dL 9 3   ALBUMIN g/dL 4 6   TOTAL BILIRUBIN mg/dL 0 42   ALK PHOS U/L 95   ALT U/L 10   AST U/L 24   GLUCOSE RANDOM mg/dL 53*         Results from last 7 days   Lab Units 02/01/23  1127   POC GLUCOSE mg/dl 95     Lines/Drains:  Invasive Devices     Peripheral Intravenous Line  Duration           Peripheral IV 02/01/23 Right;Ventral (anterior) Forearm <1 day                Imaging: Personally reviewed the following imaging: chest xray  XR chest 1 view portable    (Results Pending)         ** Please Note: This note has been constructed using a voice recognition system   **

## 2023-02-01 NOTE — ED NOTES
Patient resting comfortably at this time, offering no complaints        Emanuel Rocha RN  02/01/23 4200

## 2023-02-01 NOTE — PLAN OF CARE
Problem: PAIN - ADULT  Goal: Verbalizes/displays adequate comfort level or baseline comfort level  Description: Interventions:  - Encourage patient to monitor pain and request assistance  - Assess pain using appropriate pain scale  - Administer analgesics based on type and severity of pain and evaluate response  - Implement non-pharmacological measures as appropriate and evaluate response  - Consider cultural and social influences on pain and pain management  - Notify physician/advanced practitioner if interventions unsuccessful or patient reports new pain  Outcome: Progressing     Problem: INFECTION - ADULT  Goal: Absence or prevention of progression during hospitalization  Description: INTERVENTIONS:  - Assess and monitor for signs and symptoms of infection  - Monitor lab/diagnostic results  - Monitor all insertion sites, i e  indwelling lines, tubes, and drains  - Monitor endotracheal if appropriate and nasal secretions for changes in amount and color  - [place] appropriate cooling/warming therapies per order  - Administer medications as ordered  - Instruct and encourage patient and family to use good hand hygiene technique  - Identify and instruct in appropriate isolation precautions for identified infection/condition  Outcome: Progressing     Problem: SAFETY ADULT  Goal: Patient will remain free of falls  Description: INTERVENTIONS:  - Educate patient/family on patient safety including physical limitations  - Instruct patient to call for assistance with activity   - Consult OT/PT to assist with strengthening/mobility   - Keep Call bell within reach  - Keep bed low and locked with side rails adjusted as appropriate  - Keep care items and personal belongings within reach  - Initiate and maintain comfort rounds  - Make Fall Risk Sign visible to staff  - Offer Toileting every 1-2 Hours, in advance of need  - Initiate/Maintain bed and chair alarm  - Obtain necessary fall risk management equipment: fall socks and call bell in reach  - Apply yellow socks and bracelet for high fall risk patients  - Consider moving patient to room near nurses station  Outcome: Progressing  Goal: Maintain or return to baseline ADL function  Description: INTERVENTIONS:  -  Assess patient's ability to carry out ADLs; assess patient's baseline for ADL function and identify physical deficits which impact ability to perform ADLs (bathing, care of mouth/teeth, toileting, grooming, dressing, etc )  - Assess/evaluate cause of self-care deficits   - Assess range of motion  - Assess patient's mobility; develop plan if impaired  - Assess patient's need for assistive devices and provide as appropriate  - Encourage maximum independence but intervene and supervise when necessary  - Involve family in performance of ADLs  - Assess for home care needs following discharge   - Consider OT consult to assist with ADL evaluation and planning for discharge  - Provide patient education as appropriate  Outcome: Progressing  Goal: Maintains/Returns to pre admission functional level  Description: INTERVENTIONS:  - Perform BMAT or MOVE assessment daily    - Set and communicate daily mobility goal to care team and patient/family/caregiver  - Collaborate with rehabilitation services on mobility goals if consulted  - Perform Range of Motion 3-4 times a day  - Reposition patient every 1-2 hours    - Dangle patient 3-4 times a day  - Stand patient 3-4 times a day  - Ambulate patient 3-4 times a day  - Out of bed to chair 3-4 times a day   - Out of bed for meals 3-4 times a day  - Out of bed for toileting  - Record patient progress and toleration of activity level   Outcome: Progressing     Problem: DISCHARGE PLANNING  Goal: Discharge to home or other facility with appropriate resources  Description: INTERVENTIONS:  - Identify barriers to discharge w/patient and caregiver  - Arrange for needed discharge resources and transportation as appropriate  - Identify discharge learning needs (meds, wound care, etc )  - Arrange for interpretive services to assist at discharge as needed  - Refer to Case Management Department for coordinating discharge planning if the patient needs post-hospital services based on physician/advanced practitioner order or complex needs related to functional status, cognitive ability, or social support system  Outcome: Progressing     Problem: Knowledge Deficit  Goal: Patient/family/caregiver demonstrates understanding of disease process, treatment plan, medications, and discharge instructions  Description: Complete learning assessment and assess knowledge base    Interventions:  - Provide teaching at level of understanding  - Provide teaching via preferred learning methods  Outcome: Progressing     Problem: METABOLIC, FLUID AND ELECTROLYTES - ADULT  Goal: Electrolytes maintained within normal limits  Description: INTERVENTIONS:  - Monitor labs and assess patient for signs and symptoms of electrolyte imbalances  - Administer electrolyte replacement as ordered  - Monitor response to electrolyte replacements, including repeat lab results as appropriate  - Instruct patient on fluid and nutrition as appropriate  Outcome: Progressing  Goal: Fluid balance maintained  Description: INTERVENTIONS:  - Monitor labs   - Monitor I/O and WT  - Instruct patient on fluid and nutrition as appropriate  - Assess for signs & symptoms of volume excess or deficit  Outcome: Progressing  Goal: Glucose maintained within target range  Description: INTERVENTIONS:  - Monitor Blood Glucose as ordered  - Assess for signs and symptoms of hyperglycemia and hypoglycemia  - Administer ordered medications to maintain glucose within target range  - Assess nutritional intake and initiate nutrition service referral as needed  Outcome: Progressing

## 2023-02-01 NOTE — UTILIZATION REVIEW
NOTIFICATION OF INPATIENT ADMISSION   AUTHORIZATION REQUEST   SERVICING FACILITY:   [address] [place] [address]  [address], [place], [address]  Tax ID:  [id number]  NPI: [id number] ATTENDING PROVIDER:  Attending Name and NPI#: Morris Warner [[id number]]  Address: [address]  [place] [address]  Phone: [phone]     ADMISSION INFORMATION:  Place of Service: Inpatient [address] [facility]  [address]  Place of Service Code: 21  Inpatient Admission Date/Time: 2/1/23  8:04 AM  Discharge Date/Time: No discharge date for patient encounter  Admitting Diagnosis Code/Description:  Low sodium levels [E87 1]     UTILIZATION REVIEW CONTACT:  Raul Garcia Utilization   Network Utilization Review Department  Phone: [phone]  Fax [phone]  Email: [email or web address]  Contact for approvals/pending authorizations, clinical reviews, and discharge  PHYSICIAN ADVISORY SERVICES:  Medical Necessity Denial & [place] Review  Phone: [phone]  Fax: [phone]  Email: [email or web address]

## 2023-02-01 NOTE — ASSESSMENT & PLAN NOTE
History noted  Patient reports alcohol in remission, currently drinking up to 6 pack of nonalcoholic beers daily

## 2023-02-01 NOTE — Clinical Note
Case was discussed with Dr Sol Houston and the patient's admission status was agreed to be Admission Status: inpatient status to the service of Dr Sol Houston

## 2023-02-01 NOTE — ASSESSMENT & PLAN NOTE
History of chronic hyponatremia, currently worsened  Sodium of 119 at time of presentation, asymptomatic  Baseline appears to be between 120-125, with a goal of 125-129 according to last nephrology note  Previously attributed to adrenal insufficiency, this was ruled out by endocrinology with patient having a normal fasting cortisol and ACTH levels off steroids  Etiology potentially insufficient solute/Beer Potomania v cirrhosis  Patient does endorse noncompliance with fluid restriction  Will fluid restrict for now  Continue home Bumex pending nephrology evaluation  Patient was intolerant of salt tablets (nausea and vomiting)  Nephrology consult pending

## 2023-02-01 NOTE — ASSESSMENT & PLAN NOTE
Prior episodes of seizures with normal EEGs  Potentially provoked by EtOH abuse according to neurology  Continue Sheri

## 2023-02-01 NOTE — NURSING NOTE
Spoke with dr Guanakito Cagle via tiger text regarding multiple BMP orders   Clarified to keep the every 2hr BMP in place and ok to d/c every 8hr BMP

## 2023-02-01 NOTE — CONSULTS
Consultation - Nephrology   Samantha Edge 65 y o  male MRN: [id number]  Unit/Bed#: 406-01 Encounter: [id number]      A/P:  1  Hyponatremia   - had a urine osmolality of 278 and serum osm of 332 - hypo osmolar hyponatremia due to inc fluid intake and use of diuretic therapy  Will fluid restrict for 24 hours and give normal saline 500 mls  Due to low urine sodium This is confounded by his cirrhosis and would not expect correction to WNL   - will not give albumin infusion as his albumin is 4 6 and he is euvolemic   - check bmp q 2 h for slow correction  This is chronic and not acute and does not need rapid correction (4-6 meq/d adequate)   - will need counseling on fluid intake at home  He is drinking at least 120 ounces a day of fluid as well as a high salt diet in the form of cold cuts and Gatorade    2  Hypokalemia and hypomagnesemia   - supplemented    3  Seizure disorder   - continue Keppra 1 g bid    4, Nicotine abuse   - no evidence of SIADH on prior labs but will recheck    5  Essential hypertension   - blood pressure is low at 97/68   - stop bumex and lisinopril         Thank you for allowing us to participate in the care of your patient  Please feel free to contact us regarding the care of this patient, or any other questions/concerns that may be applicable      Patient Active Problem List   Diagnosis   • Tobacco abuse   • Essential hypertension   • H/O suicide attempt   • H/O cervical spine surgery   • Hyponatremia   • Dietary folate deficiency anemia   • Ventral hernia without obstruction or gangrene   • Hepatomegaly   • Hepatic steatosis   • Hypomagnesemia   • Elevated alkaline phosphatase level   • Alcoholic hepatitis without ascites   • Vitamin D deficiency   • Iron deficiency   • Urinary retention   • Bladder wall thickening   • Hypophosphatemia   • Generalized weakness   • Malnutrition of moderate degree (HCC)   • Hypokalemia   • Continuous chronic alcoholism (HCC)   • Incisional hernia   • Hx of seizure disorder   • Seizure-like activity (HCC)   • Diarrhea   • Abnormality of pancreatic duct   • Allergic rhinitis   • Microcytic anemia   • Abdominal wound dehiscence   • Cervical disc disorder with myelopathy   • Cervical spinal stenosis   • Depression   • Left foot drop   • Lumbar radiculopathy   • Neuropathy involving both lower extremities   • Peripheral neuropathy   • T6 vertebral fracture (HCC)   • Alcoholic cirrhosis of liver without ascites (HCC)   • Thrombocytopenia (HCC)   • Closed fracture of left olecranon process, initial encounter   • Iron deficiency anemia due to chronic blood loss   • Duodenal ulcer   • Tubular adenoma   • Traore esophagus   • Celiac artery stenosis (HCC)   • Pancreatic mass   • Pancytopenia (HCC)   • Constipation   • Transaminitis   • Lesion of spleen   • Left anterior fascicular block   • Abnormal EKG   • Acute on chronic pancreatitis (HCC)   • Pancreatic pseudocyst   • COPD (chronic obstructive pulmonary disease) (HCC)   • Ileus (HCC)   • Ambulatory dysfunction   • Current every day smoker   • Fall   • Hx of duodenal ulcer   • Neck pain   • Alcohol use   • Thoracic compression fracture (HCC)   • Aortic ectasia (HCC)   • Rib fractures   • Seizure disorder (HCC)   • Hypoxia   • Traore's esophagus   • Elevated d-dimer   • COVID-19 virus infection   • Hypocalcemia   • Clavicular fracture   • Low serum cortisol level   • Chronic anemia   • Osteoporosis with current pathological fracture       History of Present Illness   Physician Requesting Consult: Tobey Jeans, MD  Reason for Consult / Principal Problem: hyponatremia  Hx and PE limited by:   HPI: Jocelin Feliz is a 65y o  year old male who presents with marked hyponatremia of 119 mmol/l  He was sent for labs by his PCP and then referred to the ED due to abnormal levels   He has a history of alcoholic  He was taking Bumex orally daily with polyuria and no weight loss    cirrhosis and has had prior admissions for hyponatremia  His PCP tried salt tabs 2 weeks ago and he could not tolerate them having nausea and vomiting  Unfortunately, although he does avoid alcohol, he drinks 6 cans ov nonalcoholic beer (72 ounces) and 32 ounces of regular Gatorade  (approx 400mg of sodium) and high salt cold cuts daily  He drinks water as well  History obtained from chart review and the patient    Constitutional ROS- Denies fatigue, fever, chills, night sweats, weight changes  HEENT ROS- Denies history of eye surgeries, glaucoma, headaches or history of trauma, blurred vision     Endocrine ROS- No history diabetes mellitus or thyroid disease  Cardiovascular ROS- Denies chest pain, palpitation, dyspnea exertion, orthopnea, claudication  Pulmonary ROS- Denies history of COPD, asthma  Denies cough, hemoptysis, shortness of breath  GI ROS- Denies abdominal pain, diarrhea, nausea, swallowing problems, vomiting, constipation, blood in stools, fecal incontinence  Hematological ROS- Denies history of easy bruising, blood clots, bleeding or blood transfusions  Genitourinary ROS- Denies recent hematuria, pyuria, flank pain, change in urinary stream, decreased urinary output, increased urinary frequency,    Lymphatic ROS- Denies lymphadenopathy  Musculoskeletal ROS- Denies weakness  sorientation  Neurological ROS- No stroke or TIA symptoms        Historical Information   Past Medical History:   Diagnosis Date   • Alcohol abuse    • Traore esophagus    • Bowel obstruction (HCC)    • Bowel perforation (HCC)    • Cardiac disease    • Continuous chronic alcoholism ([facility] [address] ) 10/5/2017   • COPD (chronic obstructive pulmonary disease) (HCC)    • History of shoulder surgery     Right shoulder   • History of transfusion    • Hx of cervical spine surgery    • Hypertension    • Incisional hernia 10/8/2017   • MI, old    • Mitral regurgitation    • Psychiatric disorder    • Seizures [facility])      Past Surgical History:   Procedure Laterality Date   • APPENDECTOMY     • BACK SURGERY     • CHOLECYSTECTOMY     • ESOPHAGOGASTRODUODENOSCOPY N/A 11/28/2016    Procedure: ESOPHAGOGASTRODUODENOSCOPY (EGD); Surgeon: Liz Cesar MD;  Location: [facility];   Service:    • GALLBLADDER SURGERY     • LAPAROTOMY N/A 10/25/2016    Procedure: LAPAROTOMY EXPLORATORY;  Surgeon: Curtis Carias MD;  Location: [facility];  Service:    • MOUTH SURGERY     • PERCUTANEOUS PINNING FEMORAL NECK FRACTURE     • SHOULDER SURGERY Right    • SHOULDER SURGERY     • SMALL INTESTINE SURGERY     • STOMACH SURGERY      bal surgery     Social History   Social History     Substance and Sexual Activity   Alcohol Use Yes   • Alcohol/week: 42 0 standard drinks   • Types: 42 Cans of beer per week    Comment: a six pack a day     Social History     Substance and Sexual Activity   Drug Use No     Social History     Tobacco Use   Smoking Status Every Day   • Packs/day: 3 00   • Years: 35 00   • Pack years: 105 00   • Types: Cigarettes   Smokeless Tobacco Never     Family History   Problem Relation Age of Onset   • Breast cancer Mother    • Prostate cancer Father    • Skin cancer Brother        Meds/Allergies   all current active meds have been reviewed, current meds:   Current Facility-Administered Medications   Medication Dose Route Frequency   • cholecalciferol (VITAMIN D3) tablet 1,000 Units  1,000 Units Oral Daily   • cyanocobalamin (VITAMIN B-12) tablet 100 mcg  100 mcg Oral Daily   • docusate sodium (COLACE) capsule 100 mg  100 mg Oral BID PRN   • folic acid (FOLVITE) tablet 1 mg  1 mg Oral Daily   • heparin (porcine) subcutaneous injection 5,000 Units  5,000 Units Subcutaneous Q8H [address]   • levETIRAcetam (KEPPRA) tablet 1,000 mg  1,000 mg Oral Q12H [address]   • LORazepam (ATIVAN) tablet 0 5 mg  0 5 mg Oral HS   • multivitamin-minerals (CENTRUM) tablet 1 tablet  1 tablet Oral Daily   • nicotine (NICODERM CQ) 14 mg/24hr TD 24 hr patch 1 patch  1 patch Transdermal Daily   • ondansetron (ZOFRAN) injection 4 mg  4 mg Intravenous Q6H PRN   • potassium chloride (K-DUR,KLOR-CON) CR tablet 30 mEq  30 mEq Oral Once   • thiamine tablet 100 mg  100 mg Oral Daily    and PTA meds:    Medications Prior to Admission   Medication   • bumetanide (BUMEX) 1 mg tablet   • Cholecalciferol 25 MCG (1000 UT) tablet   • cyanocobalamin (VITAMIN B-12) 100 mcg tablet   • Diclofenac Sodium (VOLTAREN) 1 %   • docusate sodium (COLACE) 100 mg capsule   • ergocalciferol (VITAMIN D2) 14,748 units   • folic acid (FOLVITE) 1 mg tablet   • levETIRAcetam (KEPPRA) 1000 MG tablet   • lisinopril (ZESTRIL) 2 5 mg tablet   • LORazepam (ATIVAN) 0 5 mg tablet   • Multiple Vitamin (TAB-A-BONI PO)   • multivitamin (THERAGRAN) TABS   • thiamine 100 MG tablet         No Known Allergies    Objective     Intake/Output Summary (Last 24 hours) at 2/1/2023 1203  Last data filed at 2/1/2023 1120  Gross per 24 hour   Intake 240 ml   Output --   Net 240 ml       Invasive Devices:        Physical Exam      No intake/output data recorded  Vitals:    02/01/23 1107   BP: 97/68   Pulse: 77   Resp: 18   Temp: 98 4 °F (36 9 °C)   SpO2: 94%       General Appearance:    No acute distress  Cooperative  Appears stated age  Head:    Normocephalic  Atraumatic  Normal jaw occlusion  Eyes:    Lids, conjunctiva normal  No scleral icterus  Ears:    Normal external ears  Nose:   Nares normal  No drainage  Mouth:   Lips, tongue normal  Mucosa normal  Phonation normal    Neck:   Supple  Symmetrical    Back:     Symmetric  No CVA tenderness  Lungs:     Normal respiratory effort  Clear to auscultation bilaterally  Chest wall:    No tenderness or deformity  Heart:    Regular rate and rhythm  Normal S1 and S2  No murmur  No JVD  No edema  Abdomen:     Soft  Non-tender  Bowel sounds active  Genitourinary:   No Tabares catheter present  Extremities:   Extremities normal  Atraumatic  No cyanosis  Skin:   Warm and dry  No pallor, jaundice, rash, ecchymoses  Neurologic:   Alert and oriented to person, place, time  No focal deficit  Current Weight: Weight - Scale: 48 7 kg (107 lb 5 8 oz)  First Weight: Weight - Scale: 48 7 kg (107 lb 5 8 oz)    Lab Results:  I have personally reviewed pertinent labs  CBC:   Lab Results   Component Value Date    WBC 8 32 02/01/2023    HGB 14 4 02/01/2023    HCT 39 8 02/01/2023    MCV 96 02/01/2023     02/01/2023    MCH 34 8 (H) 02/01/2023    MCHC 36 2 02/01/2023    RDW 13 0 02/01/2023    MPV 8 8 (L) 02/01/2023    NRBC 0 02/01/2023     CMP:   Lab Results   Component Value Date    K 3 3 (L) 02/01/2023    CL 81 (L) 02/01/2023    CO2 27 02/01/2023    BUN 22 02/01/2023    CREATININE 0 86 02/01/2023    CALCIUM 9 3 02/01/2023    AST 24 02/01/2023    ALT 10 02/01/2023    ALKPHOS 95 02/01/2023    EGFR 96 02/01/2023     Phosphorus:   Lab Results   Component Value Date    PHOS 2 7 02/01/2023     Magnesium:   Lab Results   Component Value Date    MG 1 5 (L) 02/01/2023     Urinalysis: No results found for: Ty Retort, SPECGRAV, PHUR, LEUKOCYTESUR, NITRITE, PROTEINUA, GLUCOSEU, KETONESU, BILIRUBINUR, BLOODU  Ionized Calcium: No results found for: CAION  Coagulation: No results found for: PT, INR, APTT  Troponin: No results found for: TROPONINI  ABG: No results found for: PHART, YOH1RPY, PO2ART, EEQ1YRI, V0ZTNMKR, BEART, SOURCE    Results from last 7 days   Lab Units 02/01/23  0626 01/30/23  1035   POTASSIUM mmol/L 3 3* 3 9   CHLORIDE mmol/L 81* 83*   CO2 mmol/L 27 25   BUN mg/dL 22 19   CREATININE mg/dL 0 86 0 85   CALCIUM mg/dL 9 3 9 8   ALK PHOS U/L 95 121*   ALT U/L 10 17   AST U/L 24 27       Radiology review:  No results found  EKG, Pathology, and Other Studies: I have reviewed prior renal notes and personally reviewed the CXR (unremarkable)the       Counseling / Coordination of Care  Total ADDITIONAL floor / unit time spent today 40 minutes   Greater than 50% of total time was spent with the patient and / or family counseling and / or coordination of care  A description of the counseling / coordination of care: will discuss with primary team  Discussed with patient    Delma Gold MD      This consultation note was produced in part using a dictation device which may document imprecise wording from author's original intent

## 2023-02-02 ENCOUNTER — APPOINTMENT (OUTPATIENT)
Dept: PHYSICAL THERAPY | Facility: CLINIC | Age: 58
End: 2023-02-02

## 2023-02-02 ENCOUNTER — APPOINTMENT (INPATIENT)
Dept: CT IMAGING | Facility: HOSPITAL | Age: 58
End: 2023-02-02

## 2023-02-02 PROBLEM — R93.89 ABNORMAL CXR: Status: ACTIVE | Noted: 2023-02-02

## 2023-02-02 LAB
ANION GAP SERPL CALCULATED.3IONS-SCNC: 4 MMOL/L (ref 4–13)
BASOPHILS # BLD AUTO: 0.03 THOUSANDS/ÂΜL (ref 0–0.1)
BASOPHILS NFR BLD AUTO: 1 % (ref 0–1)
BUN SERPL-MCNC: 20 MG/DL (ref 5–25)
CALCIUM SERPL-MCNC: 8.5 MG/DL (ref 8.4–10.2)
CHLORIDE SERPL-SCNC: 90 MMOL/L (ref 96–108)
CO2 SERPL-SCNC: 26 MMOL/L (ref 21–32)
CREAT SERPL-MCNC: 0.81 MG/DL (ref 0.6–1.3)
EOSINOPHIL # BLD AUTO: 0.08 THOUSAND/ÂΜL (ref 0–0.61)
EOSINOPHIL NFR BLD AUTO: 1 % (ref 0–6)
ERYTHROCYTE [DISTWIDTH] IN BLOOD BY AUTOMATED COUNT: 13.1 % (ref 11.6–15.1)
GFR SERPL CREATININE-BSD FRML MDRD: 99 ML/MIN/1.73SQ M
GLUCOSE SERPL-MCNC: 103 MG/DL (ref 65–140)
GLUCOSE SERPL-MCNC: 114 MG/DL (ref 65–140)
GLUCOSE SERPL-MCNC: 123 MG/DL (ref 65–140)
GLUCOSE SERPL-MCNC: 92 MG/DL (ref 65–140)
GLUCOSE SERPL-MCNC: 98 MG/DL (ref 65–140)
HCT VFR BLD AUTO: 34.1 % (ref 36.5–49.3)
HGB BLD-MCNC: 12.1 G/DL (ref 12–17)
IMM GRANULOCYTES # BLD AUTO: 0.01 THOUSAND/UL (ref 0–0.2)
IMM GRANULOCYTES NFR BLD AUTO: 0 % (ref 0–2)
LYMPHOCYTES # BLD AUTO: 1.73 THOUSANDS/ÂΜL (ref 0.6–4.47)
LYMPHOCYTES NFR BLD AUTO: 26 % (ref 14–44)
MAGNESIUM SERPL-MCNC: 2.1 MG/DL (ref 1.9–2.7)
MCH RBC QN AUTO: 34.3 PG (ref 26.8–34.3)
MCHC RBC AUTO-ENTMCNC: 35.5 G/DL (ref 31.4–37.4)
MCV RBC AUTO: 97 FL (ref 82–98)
MONOCYTES # BLD AUTO: 0.92 THOUSAND/ÂΜL (ref 0.17–1.22)
MONOCYTES NFR BLD AUTO: 14 % (ref 4–12)
NEUTROPHILS # BLD AUTO: 3.8 THOUSANDS/ÂΜL (ref 1.85–7.62)
NEUTS SEG NFR BLD AUTO: 58 % (ref 43–75)
NRBC BLD AUTO-RTO: 0 /100 WBCS
OSMOLALITY UR/SERPL-RTO: 277 MMOL/KG (ref 282–298)
PLATELET # BLD AUTO: 178 THOUSANDS/UL (ref 149–390)
PMV BLD AUTO: 9.1 FL (ref 8.9–12.7)
POTASSIUM SERPL-SCNC: 4.7 MMOL/L (ref 3.5–5.3)
RBC # BLD AUTO: 3.53 MILLION/UL (ref 3.88–5.62)
SODIUM SERPL-SCNC: 120 MMOL/L (ref 135–147)
WBC # BLD AUTO: 6.57 THOUSAND/UL (ref 4.31–10.16)

## 2023-02-02 RX ORDER — SODIUM CHLORIDE 1000 MG
1 TABLET, SOLUBLE MISCELLANEOUS
Status: DISCONTINUED | OUTPATIENT
Start: 2023-02-02 | End: 2023-02-03 | Stop reason: HOSPADM

## 2023-02-02 RX ADMIN — HEPARIN SODIUM 5000 UNITS: 5000 INJECTION INTRAVENOUS; SUBCUTANEOUS at 05:17

## 2023-02-02 RX ADMIN — CHOLECALCIFEROL TAB 25 MCG (1000 UNIT) 1000 UNITS: 25 TAB at 08:23

## 2023-02-02 RX ADMIN — LORAZEPAM 0.5 MG: 0.5 TABLET ORAL at 21:36

## 2023-02-02 RX ADMIN — SODIUM CHLORIDE TAB 1 GM 1 G: 1 TAB at 13:12

## 2023-02-02 RX ADMIN — HEPARIN SODIUM 5000 UNITS: 5000 INJECTION INTRAVENOUS; SUBCUTANEOUS at 21:36

## 2023-02-02 RX ADMIN — THIAMINE HCL TAB 100 MG 100 MG: 100 TAB at 08:23

## 2023-02-02 RX ADMIN — SODIUM CHLORIDE TAB 1 GM 1 G: 1 TAB at 17:23

## 2023-02-02 RX ADMIN — HEPARIN SODIUM 5000 UNITS: 5000 INJECTION INTRAVENOUS; SUBCUTANEOUS at 13:12

## 2023-02-02 RX ADMIN — Medication 100 MCG: at 08:23

## 2023-02-02 RX ADMIN — FOLIC ACID 1 MG: 1 TABLET ORAL at 08:23

## 2023-02-02 RX ADMIN — MULTIPLE VITAMINS W/ MINERALS TAB 1 TABLET: TAB at 08:23

## 2023-02-02 RX ADMIN — LEVETIRACETAM 1000 MG: 500 TABLET, FILM COATED ORAL at 08:23

## 2023-02-02 RX ADMIN — LEVETIRACETAM 1000 MG: 500 TABLET, FILM COATED ORAL at 21:36

## 2023-02-02 NOTE — PROGRESS NOTES
Progress Note - Nephrology   Maricel Lo 65 y o  male MRN: [id number]  Unit/Bed#: 406-01 Encounter: [id number]    A/P:  1  Hyponatremia   - Admitted with a serum sodium of 121 which mainor to 123 with fluid restriction but now dropped to 120 millimoles per liter   - Urine sodium was low at 17  Salt tablets started 1 g 3 times daily  He has taken 1 g thus far -he appears euvolemic with no edema and fairly clear lungs, however, he is likely water overloaded  Plasma osmolality is greater than urine osmolality   - Continue salt tablets 3 times a day and fluid restrict   - We will give Bumex 1 mg p o  tomorrow to try to induce a water diuresis   - Check a UA to check the specific gravity and urine concentrating ability   - I told him that he could not drink more than 60 ounces a day  He can have 4 beers nonalcoholic which totals 48 ounces and then 12 ounces of water  He should avoid Gatorade as he eats a very high salt diet    2  Seizure disorder   - informed him that water intoxication can precipitate a seizure   - continue levetiracetam    3   Alcoholic cirrhosis of the liver without ascites   - unlikely to correct to > 125-126   - will need outpatient follow up        Follow up reason for today's visit: Hyponatremia    Hyponatremia    Patient Active Problem List   Diagnosis   • Tobacco abuse   • Essential hypertension   • H/O suicide attempt   • H/O cervical spine surgery   • Hyponatremia   • Dietary folate deficiency anemia   • Ventral hernia without obstruction or gangrene   • Hepatomegaly   • Hepatic steatosis   • Hypomagnesemia   • Elevated alkaline phosphatase level   • Alcoholic hepatitis without ascites   • Vitamin D deficiency   • Iron deficiency   • Urinary retention   • Bladder wall thickening   • Hypophosphatemia   • Generalized weakness   • Malnutrition of moderate degree (HCC)   • Hypokalemia   • Continuous chronic alcoholism (HCC)   • Incisional hernia   • Hx of seizure disorder   • Seizure-like activity ([facility] [address] )   • Diarrhea   • Abnormality of pancreatic duct   • Allergic rhinitis   • Microcytic anemia   • Abdominal wound dehiscence   • Cervical disc disorder with myelopathy   • Cervical spinal stenosis   • Depression   • Left foot drop   • Lumbar radiculopathy   • Neuropathy involving both lower extremities   • Peripheral neuropathy   • T6 vertebral fracture (HCC)   • Alcoholic cirrhosis of liver without ascites (HCC)   • Thrombocytopenia (HCC)   • Closed fracture of left olecranon process, initial encounter   • Iron deficiency anemia due to chronic blood loss   • Duodenal ulcer   • Tubular adenoma   • Traore esophagus   • Celiac artery stenosis (HCC)   • Pancreatic mass   • Pancytopenia (HCC)   • Constipation   • Transaminitis   • Lesion of spleen   • Left anterior fascicular block   • Abnormal EKG   • Acute on chronic pancreatitis (HCC)   • Pancreatic pseudocyst   • COPD (chronic obstructive pulmonary disease) (HCC)   • Ileus (HCC)   • Ambulatory dysfunction   • Current every day smoker   • Fall   • Hx of duodenal ulcer   • Neck pain   • Alcohol use   • Thoracic compression fracture (HCC)   • Aortic ectasia (HCC)   • Rib fractures   • Seizure disorder (HCC)   • Hypoxia   • Traore's esophagus   • Elevated d-dimer   • COVID-19 virus infection   • Hypocalcemia   • Clavicular fracture   • Low serum cortisol level   • Chronic anemia   • Osteoporosis with current pathological fracture   • Abnormal CXR         Subjective:   A 10 point ROS is negative    Objective:     Vitals: Blood pressure 104/73, pulse 104, temperature 98 4 °F (36 9 °C), resp  rate 16, height 5' 4" (1 626 m), weight 49 7 kg (109 lb 9 1 oz), SpO2 96 %  ,Body mass index is 18 81 kg/m²      Weight (last 2 days)     Date/Time Weight    02/02/23 0544 49 7 (109 57)    02/01/23 1103 48 7 (107 36)            Intake/Output Summary (Last 24 hours) at 2/2/2023 1537  Last data filed at 2/2/2023 1304  Gross per 24 hour   Intake 780 ml   Output 450 ml   Net 330 ml     I/O last 3 completed shifts: In: 65 [P O :838]  Out: 400 [Urine:400]         Physical Exam: /73   Pulse 104   Temp 98 4 °F (36 9 °C)   Resp 16   Ht 5' 4" (1 626 m)   Wt 49 7 kg (109 lb 9 1 oz)   SpO2 96%   BMI 18 81 kg/m²     General Appearance:    Alert, cooperative, no distress, appears stated age   Head:    Normocephalic, without obvious abnormality, atraumatic   Eyes:    Conjunctiva/corneas clear   Ears:    Normal external ears   Nose:   Nares normal, septum midline, mucosa normal, no drainage    or sinus tenderness   Throat:   Lips, mucosa, and tongue normal; teeth and gums normal   Neck:   Supple, symmetrical, trachea midline, no adenopathy;        thyroid:  No enlargement/tenderness/nodules; no carotid    bruit or JVD   Back:     Symmetric, no curvature, ROM normal, no CVA tenderness   Lungs:     Clear to auscultation bilaterally, respirations unlabored   Chest wall:    No tenderness or deformity   Heart:    Regular rate and rhythm, S1 and S2 normal, no murmur, rub   or gallop   Abdomen:     Soft, non-tender, bowel sounds active   Extremities:   Extremities normal, atraumatic, no cyanosis or edema   Skin:   Skin color, texture, turgor normal, no rashes or lesions   Lymph nodes:   Cervical normal   Neurologic:   CNII-XII intact            Lab, Imaging and other studies: I have personally reviewed pertinent labs  CBC:   Lab Results   Component Value Date    WBC 6 57 02/02/2023    HGB 12 1 02/02/2023    HCT 34 1 (L) 02/02/2023    MCV 97 02/02/2023     02/02/2023    MCH 34 3 02/02/2023    MCHC 35 5 02/02/2023    RDW 13 1 02/02/2023    MPV 9 1 02/02/2023    NRBC 0 02/02/2023     CMP:   Lab Results   Component Value Date    K 4 7 02/02/2023    CL 90 (L) 02/02/2023    CO2 26 02/02/2023    BUN 20 02/02/2023    CREATININE 0 81 02/02/2023    CALCIUM 8 5 02/02/2023    EGFR 99 02/02/2023           Results from last 7 days   Lab Units 02/02/23  0505 02/01/23 2033 02/01/23  1814 02/01/23  0626 01/30/23  1035   POTASSIUM mmol/L 4 7 4 6 4 4 3 3* 3 9   CHLORIDE mmol/L 90* 89* 87* 81* 83*   CO2 mmol/L 26 28 25 27 25   BUN mg/dL 20 24 23 22 19   CREATININE mg/dL 0 81 0 98 0 99 0 86 0 85   CALCIUM mg/dL 8 5 8 7 8 6 9 3 9 8   ALK PHOS U/L  --   --   --  95 121*   ALT U/L  --   --   --  10 17   AST U/L  --   --   --  24 27         Phosphorus: No results found for: PHOS  Magnesium:   Lab Results   Component Value Date    MG 2 1 02/02/2023     Urinalysis: No results found for: COLORU, CLARITYU, SPECGRAV, PHUR, LEUKOCYTESUR, NITRITE, PROTEINUA, GLUCOSEU, KETONESU, BILIRUBINUR, BLOODU  Ionized Calcium: No results found for: CAION  Coagulation: No results found for: PT, INR, APTT  Troponin: No results found for: TROPONINI  ABG: No results found for: PHART, RHC9ZDM, PO2ART, TFN3WVQ, M2QHCJOU, BEART, SOURCE  Radiology review:     IMAGING  Procedure: XR chest 1 view portable    Result Date: 2/1/2023  Narrative: CHEST INDICATION:   hyponatremia r/o SCC  COMPARISON:  5/23/2022 CT and chest x-ray EXAM PERFORMED/VIEWS:  XR CHEST PORTABLE FINDINGS: Cardiomediastinal silhouette appears unremarkable  There is new density at the right lung apex where clavicle fracture was noted in May and could represent callus formation  No obvious nodule or consolidation is seen elsewhere  No pneumothorax or pleural effusion  Orthopedic hardware is noted in the right humerus and cervical spine     Impression: No acute cardiopulmonary disease  Increased density at the right lung apex may represent callus formation  Given history, confirmatory nonemergent CT should be considered  The study was marked in EPIC for significant notification  Workstation performed: [id number]     Procedure: CT chest wo contrast    Result Date: 2/2/2023  Narrative: CT CHEST WITHOUT IV CONTRAST INDICATION:   Abnormal CXR  COMPARISON:  Chest radiograph 2/1/2023  CTA chest PE study 5/23/2022   TECHNIQUE: CT examination of the chest was performed without intravenous contrast  Axial, sagittal, and coronal 2D reformatted images were created from the source data and submitted for interpretation  Radiation dose length product (DLP) for this visit:  206 25 mGy-cm   This examination, like all CT scans performed in the [facility], was performed utilizing techniques to minimize radiation dose exposure, including the use of iterative  reconstruction and automated exposure control  FINDINGS: LUNGS:  Interval resolution of previously seen bilateral peripheral groundglass opacities  No tracheal or endobronchial lesion  No acute consolidative airspace disease  Linear scarring/atelectasis of the lingula and the left lower lobe  PLEURA:  Small right pleural effusion, slightly increased since prior study 5/23/2022  HEART/GREAT VESSELS: The heart is not enlarged  No pericardial effusion  Three-vessel coronary artery calcifications  Aortic valve calcifications  Mitral annulus calcifications  No thoracic aortic aneurysm  Mild atherosclerotic calcifications of the  thoracic aorta  MEDIASTINUM AND MANE:  Mildly patulous esophagus with air-fluid levels  CHEST WALL AND LOWER NECK:  Bilateral gynecomastia  VISUALIZED STRUCTURES IN THE UPPER ABDOMEN: Partially visualized 6 9 x 6 2 cm pseudocyst in the pancreatic head region (2/1), increased size since 5/23/2022 where it measured 3 6 x 4 1 cm when remeasured in a similar fashion  Mildly dilated main pancreatic duct measuring 4 mm, previously 3 mm  Cholecystectomy  Splenic calcifications, likely due to sequela prior granulomatous disease  Mild nodularity of the liver surface contour, keeping with cirrhosis  OSSEOUS STRUCTURES:  No acute fracture identified  Healed fracture of the medial right clavicle with bridging callus, which accounts for the abnormality seen on recent chest radiograph 2/1/2023  Healing left posterior 10th-12th rib fractures with sclerosis  Old healed bilateral rib fractures    Old healed spinous process fractures in the upper and mid thoracic spine  Unchanged multiple compression deformities in the upper and mid thoracic spine  ORIF of the right humerus  Cervicothoracic fusion  Mild degenerative changes of the spine with mild kyphosis  Impression: 1  No suspicious lung mass or right apical lung mass  2   Healed fracture of the medial right clavicle with bridging callus, which accounts for the abnormality seen on recent chest radiograph 2/1/2023   3   Increased size of the partially visualized 6 9 cm pancreatic head pseudocyst since 5/23/2022 and new mild dilatation of the main pancreatic duct, likely due to mass effect from the pancreatic head pseudocyst   Recommend correlation with lipase  4   Mildly patulous esophagus with air-fluid levels  The study was marked in [facility] for immediate notification   Workstation performed: [id number]       Current Facility-Administered Medications   Medication Dose Route Frequency   • cholecalciferol (VITAMIN D3) tablet 1,000 Units  1,000 Units Oral Daily   • cyanocobalamin (VITAMIN B-12) tablet 100 mcg  100 mcg Oral Daily   • diphenhydrAMINE (BENADRYL) tablet 25 mg  25 mg Oral Q8H PRN   • docusate sodium (COLACE) capsule 100 mg  100 mg Oral BID PRN   • folic acid (FOLVITE) tablet 1 mg  1 mg Oral Daily   • heparin (porcine) subcutaneous injection 5,000 Units  5,000 Units Subcutaneous Q8H [address]   • levETIRAcetam (KEPPRA) tablet 1,000 mg  1,000 mg Oral Q12H KIMBERLY   • LORazepam (ATIVAN) tablet 0 5 mg  0 5 mg Oral HS   • multivitamin-minerals (CENTRUM) tablet 1 tablet  1 tablet Oral Daily   • nicotine (NICODERM CQ) 14 mg/24hr TD 24 hr patch 1 patch  1 patch Transdermal Daily   • ondansetron (ZOFRAN) injection 4 mg  4 mg Intravenous Q6H PRN   • sodium chloride tablet 1 g  1 g Oral TID With Meals   • thiamine tablet 100 mg  100 mg Oral Daily     Medications Discontinued During This Encounter   Medication Reason   • albuterol (PROVENTIL HFA,VENTOLIN HFA) 90 mcg/act inhaler    • dextromethorphan-guaifenesin (MUCINEX DM)  MG per 12 hr tablet    • esomeprazole (NexIUM) 40 MG capsule    • lactulose (CHRONULAC) 10 g/15 mL solution    • loratadine (Claritin Reditabs) 10 MG dissolvable tablet    • polyethylene glycol (GOLYTELY) 4000 mL solution    • pancrelipase, Lip-Prot-Amyl, (CREON) 6,000 units delayed release capsule    • bumetanide (BUMEX) tablet 1 mg    • sodium chloride 0 9 % infusion        Delgado Auguste MD      This progress note was produced in part using a dictation device which may document imprecise wording from author's original intent

## 2023-02-02 NOTE — ASSESSMENT & PLAN NOTE
Prior episodes of seizures with normal EEGs  Potentially provoked by EtOH withdrawal according to neurology  Continue Sheri

## 2023-02-02 NOTE — CASE MANAGEMENT
Case Management Assessment & Discharge Planning Note    Patient name Mary Mccurdy  Location [facility] 723/[id number] MRN [id number]  : 1965 Date 2023       Current Admission Date: 2023  Current Admission Diagnosis:Hyponatremia   Patient Active Problem List    Diagnosis Date Noted   • Abnormal CXR 2023   • Osteoporosis with current pathological fracture 2022   • Chronic anemia 2022   • Clavicular fracture 2022   • Low serum cortisol level 2022   • Hypocalcemia 2022   • Elevated d-dimer 2022   • COVID-19 virus infection 2022   • Alcohol use 2022   • Thoracic compression fracture ([address] ) 2022   • Aortic ectasia ([address] ) 2022   • Rib fractures 2022   • Seizure disorder ([address] ) 2022   • Hypoxia 2022   • Traore's esophagus 2022   • Ambulatory dysfunction 2021   • Current every day smoker 2021   • Fall 2021   • Hx of duodenal ulcer 2021   • Neck pain 2021   • Acute on chronic pancreatitis ([address] ) 2020   • Pancreatic pseudocyst 2020   • COPD (chronic obstructive pulmonary disease) ([address] ) 2020   • Ileus ([address] ) 2020   • Abnormal EKG 2020   • Lesion of spleen 2020   • Left anterior fascicular block 2020   • Constipation 2020   • Transaminitis 2020   • Celiac artery stenosis ([address] ) 2020   • Pancreatic mass 2020   • Pancytopenia ([address] ) 2020   • Traore esophagus    • Duodenal ulcer 2019   • Tubular adenoma 2019   • Iron deficiency anemia due to chronic blood loss 10/22/2019   • Closed fracture of left olecranon process, initial encounter 2019   • Thrombocytopenia ([address] )    • Alcoholic cirrhosis of liver without ascites ([facility] ) 2018   • Seizure-like activity ([facility] ) 2018   • Diarrhea 2018   • Abnormality of pancreatic duct 2017   • Hx of seizure disorder 10/12/2017   • Incisional hernia 10/08/2017   • Continuous chronic alcoholism ([facility] [address] ) 10/05/2017   • T6 vertebral fracture ([facility] [address] ) 09/21/2017   • Neuropathy involving both lower extremities 08/15/2017   • Hypokalemia 07/29/2017   • Malnutrition of moderate degree (HCC) 05/18/2017   • Generalized weakness 05/17/2017   • Bladder wall thickening 03/09/2017   • Hypophosphatemia 03/09/2017   • Urinary retention 19/12/3000   • Alcoholic hepatitis without ascites 03/07/2017   • Vitamin D deficiency 03/07/2017   • Iron deficiency 03/07/2017   • Depression 02/08/2017   • Elevated alkaline phosphatase level 01/14/2017   • Hepatomegaly 01/12/2017   • Hepatic steatosis 01/12/2017   • Hypomagnesemia 01/12/2017   • Hyponatremia 01/11/2017   • Dietary folate deficiency anemia 01/11/2017   • Ventral hernia without obstruction or gangrene 01/11/2017   • Abdominal wound dehiscence 12/15/2016   • Microcytic anemia 06/20/2016   • Allergic rhinitis 06/07/2016   • Tobacco abuse 05/22/2016   • Essential hypertension 05/22/2016   • H/O suicide attempt 05/22/2016   • H/O cervical spine surgery 05/22/2016   • Left foot drop 10/31/2014   • Peripheral neuropathy 10/31/2014   • Cervical disc disorder with myelopathy 09/25/2014   • Lumbar radiculopathy 09/25/2014   • Cervical spinal stenosis 07/30/2014      LOS (days): 1  Geometric Mean LOS (GMLOS) (days): 2 60  Days to GMLOS:1 5     OBJECTIVE:    Risk of Unplanned Readmission Score: 11 77         Current admission status: Inpatient       Preferred Pharmacy:   [facility] [id number]  Phone: [phone] Fax: [phone]    Primary Care Provider: Renetta Negrete DO    Primary Insurance: [facility]  Secondary Insurance:     ASSESSMENT:   [address], [address] [facility]  Representative - Brother   Primary Phone: [phone] (Mobile)  Home Phone: [phone]               Advance Directives  Does patient have a Health Care POA?: No  Was patient offered paperwork?: Yes (declines)  Does patient currently have a Health Care decision maker?: Yes, please see Health Care Proxy section  Does patient have Advance Directives?: No  Was patient offered paperwork?: Yes (declines)  Primary Contact: brother:Neptali Taveras or Claudetta Dominick (Sister)         Readmission Root Cause  30 Day Readmission: No    Patient Information  Admitted from[de-identified] Home  Mental Status: Alert  During Assessment patient was accompanied by: Not accompanied during assessment  Assessment information provided by[de-identified] Patient  Primary Caregiver: Self  Support Systems: Family members (brother, sister)  [place] of Residence: [address]  What city do you live in?: [address] entry access options   Select all that apply : Stairs  Number of steps to enter home : One Flight (12)  Type of Current Residence: [address] home  Upon entering residence, is there a bedroom on the main floor (no further steps)?: Yes  Upon entering residence, is there a bathroom on the main floor (no further steps)?: Yes  In the last 12 months, was there a time when you were not able to pay the mortgage or rent on time?: No  In the last 12 months, how many places have you lived?: 1  In the last 12 months, was there a time when you did not have a steady place to sleep or slept in a shelter (including now)?: No  Homeless/housing insecurity resource given?: N/A  Living Arrangements: Lives w/ Extended Family (with brother)    Activities of Daily Living Prior to Admission  Functional Status: Independent  Completes ADLs independently?: Yes  Ambulates independently?: Yes  Does patient use assisted devices?: No  Does patient currently own DME?: Yes  What DME does the patient currently own?: Straight [employer], Wheelchair, [facility]  Does patient have a history of Outpatient Therapy (PT/OT)?: Yes (goes to OP PT in [place])  Does the patient have a history of Short-Term Rehab?: No  Does patient have a history of [facility]?: No  Does patient currently have [address]?: No         Patient Information Continued  Income Source: SSI/SSD  Does patient have prescription coverage?: Yes  Within the past 12 months, you worried that your food would run out before you got the money to buy more : Never true  Within the past 12 months, the food you bought just didn't last and you didn't have money to get more : Never true  Food insecurity resource given?: N/A  Does patient receive dialysis treatments?: No  Does patient have a history of substance abuse?: Yes  Historical substance use preference: Alcohol/ETOH  Is patient currently in treatment for substance abuse?: No  Patient declined treatment information  (pt states he drinks non alcoholic beer now)  Does patient have a history of Mental Health Diagnosis?: Yes (depression)  Is patient receiving treatment for mental health?: No  Patient declined treatment information    Has patient received inpatient treatment related to mental health in the last 2 years?: No         Means of Transportation  Means of Transport to Appts[de-identified] Family transport  In the past 12 months, has lack of transportation kept you from medical appointments or from getting medications?: No  In the past 12 months, has lack of transportation kept you from meetings, work, or from getting things needed for daily living?: No  Was application for public transport provided?: N/A        DISCHARGE DETAILS:    Discharge planning discussed with[de-identified] patient        CM contacted family/caregiver?: No- see comments (declines)  Were Treatment Team discharge recommendations reviewed with patient/caregiver?: Yes  Did patient/caregiver verbalize understanding of patient care needs?: Yes  Were patient/caregiver advised of the risks associated with not following Treatment Team discharge recommendations?: Yes      Would you like to participate in our [facility] [address] service program?  : No - Declined       Discharge Destination Plan[de-identified] Home  Transport at Discharge : Family   Plans at this time are home on dc with OP follow up and resumption of OP PT at [facility] in [place]  Pt states his brother or sister will transport him home on dc  CM will follow and assist in dc planning

## 2023-02-02 NOTE — ASSESSMENT & PLAN NOTE
CXR with increased density at the right lung apex, potentially representing callus formation  Patient does have a history of falls with rib and clavicular fractures in the past   We will check CT for confirmation

## 2023-02-02 NOTE — UTILIZATION REVIEW
Initial Clinical Review    Admission: Date/Time/Statement:   Admission Orders (From admission, onward)     Ordered        02/01/23 0804  INPATIENT ADMISSION  Once                      Orders Placed This Encounter   Procedures   • INPATIENT ADMISSION     Standing Status:   Standing     Number of Occurrences:   1     Order Specific Question:   Level of Care     Answer:   Med Surg [16]     Order Specific Question:   Estimated length of stay     Answer:   More than 2 Midnights     Order Specific Question:   Certification     Answer:   I certify that inpatient services are medically necessary for this patient for a duration of greater than two midnights  See H&P and MD Progress Notes for additional information about the patient's course of treatment  ED Arrival Information     Expected   -    Arrival   2/1/2023 06:01    Acuity   Urgent            Means of arrival   Walk-In    Escorted by   Self    Service   Hospitalist    Admission type   Emergency            Arrival complaint   Low sodium levels           Chief Complaint   Patient presents with   • Medical Problem     Pt states he was sent here by his doctor for low sodium levels  Initial Presentation: 65 y o  male PMH of chronic hyponatremia and cirrhosis,prior EtOH abuse, HTN, prior iron deficiency anemia, to ED from home admitted Inpatient d/t hyponatremia  History of chronic hyponatremia, currently worsened  Na 119, asymptomatic  Baseline appears to be between 120-125  Etiology potentially insufficient solute/Beer Potomania vs cirrhosis  does endorse noncompliance with fluid restriction  Bumex  intolerant of salt tablets (nausea and vomiting)  Nephrology consult  2/1 Nephrology consult:Hyponatremia,Hypokalemia and hypomagnesemia urine osmolality of 278 and serum osm of 332 - hypo osmolar hyponatremia due to inc fluid intake and use of diuretic therapy   Will fluid restrict for 24 hours and give normal saline 500 mls  Due to low urine sodium This is confounded by his cirrhosis and would not expect correction to WNL  chronic and not acute and does not need rapid correction  drinking at least 120 ounces a day of fluid as well as a high salt diet in the form of cold cuts and Gatorade  need counseling on fluid intake at home  Bumex and lisinopril  Date: 2/2   Day 2: sodium only improving to 120 this morning  Continue Bumex  ED Triage Vitals [02/01/23 0616]   Temperature Pulse Respirations Blood Pressure SpO2   98 4 °F (36 9 °C) 87 16 116/74 98 %      Temp Source Heart Rate Source Patient Position - Orthostatic VS BP Location FiO2 (%)   Tympanic Monitor Lying Left arm --      Pain Score       No Pain          Wt Readings from Last 1 Encounters:   02/02/23 49 7 kg (109 lb 9 1 oz)     Additional Vital Signs:   02/02/23 0815 -- -- -- -- -- 92 % None (Room air) --   02/02/23 07:25:34 97 7 °F (36 5 °C) 73 18 107/72 84 95 % -- --   02/01/23 23:07:29 98 7 °F (37 1 °C) 86 17 106/72 83 93 % -- Lying   02/01/23 14:30:44 98 3 °F (36 8 °C) 79 16 96/65 75 94 % None (Room air) --   02/01/23 14:29:48 98 3 °F (36 8 °C) 71 16 96/65 75 95 % -- --   02/01/23 11:07:23 98 4 °F (36 9 °C) 77 18 97/68 78 94 % None (Room air) --   02/01/23 0616 98 4 °F (36 9 °C) 87 16 116/74 91 98 % None (Room air) Lying       Pertinent Labs/Diagnostic Test Results:   XR chest 1 view portable   Final Result by Alysa Tobar MD (02/01 6901)      No acute cardiopulmonary disease  Increased density at the right lung apex may represent callus formation  Given history, confirmatory nonemergent CT should be considered  The study was marked in EPIC for significant notification                    Workstation performed: [id number]         CT chest wo contrast    (Results Pending)         Results from last 7 days   Lab Units 02/02/23  0505 02/01/23  0626   WBC Thousand/uL 6 57 8 32   HEMOGLOBIN g/dL 12 1 14 4   HEMATOCRIT % 34 1* 39 8   PLATELETS Thousands/uL 178 242   NEUTROS ABS Thousands/µL 3 80 5 63         Results from last 7 days   Lab Units 02/02/23  0505 02/01/23 2033 02/01/23  1814 02/01/23  0626 01/30/23  1035   SODIUM mmol/L 120* 123* 121* 119* 119*   POTASSIUM mmol/L 4 7 4 6 4 4 3 3* 3 9   CHLORIDE mmol/L 90* 89* 87* 81* 83*   CO2 mmol/L 26 28 25 27 25   ANION GAP mmol/L 4 6 9 11 11   BUN mg/dL 20 24 23 22 19   CREATININE mg/dL 0 81 0 98 0 99 0 86 0 85   EGFR ml/min/1 73sq m 99 85 84 96 97   CALCIUM mg/dL 8 5 8 7 8 6 9 3 9 8   MAGNESIUM mg/dL 2 1  --   --  1 5* 1 9   PHOSPHORUS mg/dL  --   --   --  2 7  --      Results from last 7 days   Lab Units 02/01/23  0626 01/30/23  1035   AST U/L 24 27   ALT U/L 10 17   ALK PHOS U/L 95 121*   TOTAL PROTEIN g/dL 8 3 8 6*   ALBUMIN g/dL 4 6 3 9   TOTAL BILIRUBIN mg/dL 0 42 0 46     Results from last 7 days   Lab Units 02/02/23  0724 02/01/23  1557 02/01/23  1127   POC GLUCOSE mg/dl 98 82 95     Results from last 7 days   Lab Units 02/02/23  0505 02/01/23 2033 02/01/23  1814 02/01/23  0626   GLUCOSE RANDOM mg/dL 92 103 196* 53*     Results from last 7 days   Lab Units 02/01/23  1814   OSMOLALITY, SERUM mmol/*       Results from last 7 days   Lab Units 02/01/23  0626   TSH 3RD GENERATON uIU/mL 1 678       Results from last 7 days   Lab Units 02/01/23  1814 02/01/23  0845 01/30/23  1035   OSMOLALITY, SERUM mmol/*  --   --    OSMO UR mmol/KG  --  325 278     Results from last 7 days   Lab Units 02/01/23  1429 02/01/23  0845 01/30/23  1035   SODIUM UR   --  17 15   CREATININE UR mg/dL 47 6  --   --        ED Treatment:   Medication Administration from 02/01/2023 0601 to 02/01/2023 1057       Date/Time Order Dose Route Action Action by Comments     02/01/2023 0732 EST diphenhydrAMINE (BENADRYL) tablet 25 mg 25 mg Oral Given          Past Medical History:   Diagnosis Date   • Alcohol abuse    • Traore esophagus    • Bowel obstruction (HCC)    • Bowel perforation (HCC)    • Cardiac disease    • Continuous chronic alcoholism ([facility] [address] ) 10/5/2017   • COPD (chronic obstructive pulmonary disease) (HCC)    • History of shoulder surgery     Right shoulder   • History of transfusion    • Hx of cervical spine surgery    • Hypertension    • Incisional hernia 10/8/2017   • MI, old    • Mitral regurgitation    • Psychiatric disorder    • Seizures ([facility] [address] )      Present on Admission:  • Hyponatremia  • Alcoholic cirrhosis of liver without ascites (HCC)      Admitting Diagnosis: Hypokalemia [E87 6]  Hypomagnesemia [E83 42]  Hyponatremia [E87 1]  Low sodium levels [E87 1]  Age/Sex: 65 y o  male  Admission Orders:  Scheduled Medications:  cholecalciferol, 1,000 Units, Oral, Daily  cyanocobalamin, 100 mcg, Oral, Daily  folic acid, 1 mg, Oral, Daily  heparin (porcine), 5,000 Units, Subcutaneous, Q8H KIMBERLY  levETIRAcetam, 1,000 mg, Oral, Q12H KIMBERLY  LORazepam, 0 5 mg, Oral, HS  multivitamin-minerals, 1 tablet, Oral, Daily  nicotine, 1 patch, Transdermal, Daily  sodium chloride, 1 g, Oral, TID With Meals  thiamine, 100 mg, Oral, Daily    sodium chloride 0 9 % infusion  Rate: 75 mL/hr Dose: 75 mL/hr  Freq: Continuous Route: IV  Indications of Use: IV Hydration  Last Dose: 75 mL/hr (02/01/23 1233)  Start: 02/01/23 1215 End: 02/01/23 1832            1233     1832-D/C'd          PRN Meds:  diphenhydrAMINE, 25 mg, Oral, Q8H PRN 2/1 X1  docusate sodium, 100 mg, Oral, BID PRN  ondansetron, 4 mg, Intravenous, Q6H PRN    PT/OT  AMBULATE  DAILY WEIGHTS  REG DIET      IP CONSULT TO NEPHROLOGY  IP CONSULT TO CASE MANAGEMENT    Network Utilization Review Department  ATTENTION: Please call with any questions or concerns to [phone] and carefully listen to the prompts so that you are directed to the right person  All voicemails are confidential   Elin Buerger all requests for admission clinical reviews, approved or denied determinations and any other requests to dedicated fax number below belonging to the campus where the patient is receiving treatment   List of dedicated fax numbers for the Facilities:  FACILITY NAME UR FAX NUMBER   ADMISSION DENIALS (Administrative/Medical Necessity) [phone]   [address]  (Maternity/NICU/Pediatrics) [phone]    Cecily Sanchez [address] Laura Toth  [phone]   [employer] [facility] [address] [facility] [address] [facility] [place] [address] [facility] [phone]

## 2023-02-02 NOTE — ASSESSMENT & PLAN NOTE
History of chronic hyponatremia, currently worsened  Sodium of 119 at time of presentation, asymptomatic  Baseline appears to be between 120-125, with a goal of 125-129 according to last nephrology note  Previously attributed to adrenal insufficiency, this was ruled out by endocrinology with patient having a normal fasting cortisol and ACTH levels off steroids  Etiology potentially insufficient solute/Beer Potomania v cirrhosis  Patient does endorse noncompliance with fluid restriction -he is taking in at minimum over 100 ounces of fluid daily, including 6 nonalcoholic beers, which translates to over 3 L of fluid intake per day  Patient was fluid restricted, administered 500 cc of normal saline, with sodium only improving to 120 this morning     Continue home Bumex for now  Patient was intolerant of salt tablets (nausea and vomiting), will reattempt with hospital formulary to assess for tolerance  Nephrology following

## 2023-02-02 NOTE — OCCUPATIONAL THERAPY NOTE
Occupational Therapy Evaluation     Patient Name: Rolf Hoffman  VDTGG'S Date: 2/2/2023  Problem List  Principal Problem:    Hyponatremia  Active Problems:    Hx of seizure disorder    Alcoholic cirrhosis of liver without ascites ([facility] [address] )    Abnormal CXR    Past Medical History  Past Medical History:   Diagnosis Date    Alcohol abuse     Traore esophagus     Bowel obstruction ([facility] [address] )     Bowel perforation ([facility] [address] )     Cardiac disease     Continuous chronic alcoholism ([facility] [address] ) 10/5/2017    COPD (chronic obstructive pulmonary disease) ([facility] [address] )     History of shoulder surgery     Right shoulder    History of transfusion     Hx of cervical spine surgery     Hypertension     Incisional hernia 10/8/2017    MI, old     Mitral regurgitation     Psychiatric disorder     Seizures ([facility] [address] )      Past Surgical History  Past Surgical History:   Procedure Laterality Date    APPENDECTOMY      BACK SURGERY      CHOLECYSTECTOMY      ESOPHAGOGASTRODUODENOSCOPY N/A 11/28/2016    Procedure: ESOPHAGOGASTRODUODENOSCOPY (EGD); Surgeon: Chitra Rider MD;  Location:  GI LAB; Service:     GALLBLADDER SURGERY      LAPAROTOMY N/A 10/25/2016    Procedure: LAPAROTOMY EXPLORATORY;  Surgeon: Francie Jimenez MD;  Location: [facility];  Service:     MOUTH SURGERY      PERCUTANEOUS PINNING FEMORAL NECK FRACTURE      SHOULDER SURGERY Right     SHOULDER SURGERY      SMALL INTESTINE SURGERY      STOMACH SURGERY      bal surgery             02/02/23 1418   OT Last Visit   OT Visit Date 02/02/23   Note Type   Note type Evaluation   Pain Assessment   Pain Score No Pain   Restrictions/Precautions   Weight Bearing Precautions Per Order No   Home Living   Type of [address] [place] [address] Ave Two level;Performs ADLs on one level; Able to live on main level with bedroom/bathroom;Stairs to enter with rails  (12 TIFF c HR)   Bathroom Shower/Tub Walk-in shower   Bathroom Toilet Standard   Bathroom Equipment Shower chair   [facility] Walker;Cane;Wheelchair-manual;Grab bars   Additional Comments pt reports no use of DME at baseline   Prior Function   Level of [place] Independent with ADLs; Independent with functional mobility; Independent with IADLS   Lives With Family   IADLs Family/Friend/Other provides transportation   Falls in the last 6 months 0   Comments pt is attending OP PT services   Subjective   Subjective "I have been doing great"   ADL   Where Assessed Edge of bed   LB Dressing Assistance 7  Independent   Toileting Assistance  7  Independent   Bed Mobility   Supine to Sit 7  Independent   Sit to Supine 7  Independent   Additional Comments pt on RA during session; SPO2 WFL and HR elevated into 120s   Transfers   Sit to Stand 7  Independent   Stand to Sit 7  Independent   Additional Comments no device during functional transfers   Functional Mobility   Functional Mobility 7  Independent   Additional Comments pt performs functional mobility ~200ft with no complaints of SOB   Additional items   (no device)   Balance   Static Sitting Good   Dynamic Sitting Good   Static Standing Good   Dynamic Standing Good   Ambulatory Good   Activity Tolerance   Activity Tolerance Patient tolerated treatment well   RUE Assessment   RUE Assessment WFL   LUE Assessment   LUE Assessment WFL   Hand Function   Gross Motor Coordination Functional   Fine Motor Coordination Functional   Sensation   Light Touch No apparent deficits   Sharp/Dull No apparent deficits   Psychosocial   Psychosocial (WDL) WDL   Cognition   Overall Cognitive Status WFL   Arousal/Participation Alert   Attention Within functional limits   Orientation Level Oriented X4   Memory Within functional limits   Following Commands Follows all commands and directions without difficulty   Assessment   Assessment Patient is a 65 y o  male admitted to [address] on 2/1/2023 due to Hyponatremia   Pt performed at (I) level with no OT needs identified at this time Comorbidities affecting pt's physical performance at time of assessment include cervical spine, HTN, seizures, COPD, hernia, MI  The patient's raw score on the AM-PAC Daily Activity inpatient short form is  , standardized score is 57 54, greater than 39 4  Patients at this level are likely to benefit from DC to home  From OT standpoint recommend anticipate no needs upon D/C  No further acute OT needs identified at this time  Recommend continued mobilization with hospital staff and restorative services while in the hospital to increase pt’s endurance and strength upon D/C  From OT standpoint, recommend D/C to home with family support when medically cleared  D/C pt from OT caseload at this time  Pt benefited from co-evaluation of skilled OT and PT therapists in order to most appropriately address functional deficits d/t extensive assistance required for safe functional mobility, decreased activity tolerance, and regression from functioning level prior to admission and/or onset of present illness  OT/PT objectives were addressed separately; please see PT note for specific goal areas targeted     Goals   Patient Goals to go home   Recommendation   OT Discharge Recommendation No rehabilitation needs   AM-PAC Daily Activity Inpatient   Lower Body Dressing 4   Bathing 4   Toileting 4   Upper Body Dressing 4   Grooming 4   Eating 4   Daily Activity Raw Score 24   Daily Activity Standardized Score (Calc for Raw Score >=11) 57 54   AM-PAC Applied Cognition Inpatient   Following a Speech/Presentation 4   Understanding Ordinary Conversation 4   Taking Medications 4   Remembering Where Things Are Placed or Put Away 4   Remembering List of 4-5 Errands 4   Taking Care of Complicated Tasks 4   Applied Cognition Raw Score 24   Applied Cognition Standardized Score 62 21

## 2023-02-02 NOTE — PLAN OF CARE
Problem: PAIN - ADULT  Goal: Verbalizes/displays adequate comfort level or baseline comfort level  Description: Interventions:  - Encourage patient to monitor pain and request assistance  - Assess pain using appropriate pain scale  - Administer analgesics based on type and severity of pain and evaluate response  - Implement non-pharmacological measures as appropriate and evaluate response  - Consider cultural and social influences on pain and pain management  - Notify physician/advanced practitioner if interventions unsuccessful or patient reports new pain  Outcome: Progressing     Problem: INFECTION - ADULT  Goal: Absence or prevention of progression during hospitalization  Description: INTERVENTIONS:  - Assess and monitor for signs and symptoms of infection  - Monitor lab/diagnostic results  - Monitor all insertion sites, i e  indwelling lines, tubes, and drains  - Monitor endotracheal if appropriate and nasal secretions for changes in amount and color  - [place] appropriate cooling/warming therapies per order  - Administer medications as ordered  - Instruct and encourage patient and family to use good hand hygiene technique  - Identify and instruct in appropriate isolation precautions for identified infection/condition  Outcome: Progressing     Problem: SAFETY ADULT  Goal: Patient will remain free of falls  Description: INTERVENTIONS:  - Educate patient/family on patient safety including physical limitations  - Instruct patient to call for assistance with activity   - Consult OT/PT to assist with strengthening/mobility   - Keep Call bell within reach  - Keep bed low and locked with side rails adjusted as appropriate  - Keep care items and personal belongings within reach  - Initiate and maintain comfort rounds  - Make Fall Risk Sign visible to staff  - Offer Toileting every 1-2 Hours, in advance of need  - Initiate/Maintain bed and chair alarm  - Obtain necessary fall risk management equipment: fall socks and call bell in reach  - Apply yellow socks and bracelet for high fall risk patients  - Consider moving patient to room near nurses station  Outcome: Progressing  Goal: Maintain or return to baseline ADL function  Description: INTERVENTIONS:  -  Assess patient's ability to carry out ADLs; assess patient's baseline for ADL function and identify physical deficits which impact ability to perform ADLs (bathing, care of mouth/teeth, toileting, grooming, dressing, etc )  - Assess/evaluate cause of self-care deficits   - Assess range of motion  - Assess patient's mobility; develop plan if impaired  - Assess patient's need for assistive devices and provide as appropriate  - Encourage maximum independence but intervene and supervise when necessary  - Involve family in performance of ADLs  - Assess for home care needs following discharge   - Consider OT consult to assist with ADL evaluation and planning for discharge  - Provide patient education as appropriate  Outcome: Progressing  Goal: Maintains/Returns to pre admission functional level  Description: INTERVENTIONS:  - Perform BMAT or MOVE assessment daily    - Set and communicate daily mobility goal to care team and patient/family/caregiver  - Collaborate with rehabilitation services on mobility goals if consulted  - Perform Range of Motion 3-4 times a day  - Reposition patient every 1-2 hours    - Dangle patient 3-4 times a day  - Stand patient 3-4 times a day  - Ambulate patient 3-4 times a day  - Out of bed to chair 3-4 times a day   - Out of bed for meals 3-4 times a day  - Out of bed for toileting  - Record patient progress and toleration of activity level   Outcome: Progressing     Problem: DISCHARGE PLANNING  Goal: Discharge to home or other facility with appropriate resources  Description: INTERVENTIONS:  - Identify barriers to discharge w/patient and caregiver  - Arrange for needed discharge resources and transportation as appropriate  - Identify discharge learning needs (meds, wound care, etc )  - Arrange for interpretive services to assist at discharge as needed  - Refer to Case Management Department for coordinating discharge planning if the patient needs post-hospital services based on physician/advanced practitioner order or complex needs related to functional status, cognitive ability, or social support system  Outcome: Progressing     Problem: Knowledge Deficit  Goal: Patient/family/caregiver demonstrates understanding of disease process, treatment plan, medications, and discharge instructions  Description: Complete learning assessment and assess knowledge base    Interventions:  - Provide teaching at level of understanding  - Provide teaching via preferred learning methods  Outcome: Progressing     Problem: METABOLIC, FLUID AND ELECTROLYTES - ADULT  Goal: Electrolytes maintained within normal limits  Description: INTERVENTIONS:  - Monitor labs and assess patient for signs and symptoms of electrolyte imbalances  - Administer electrolyte replacement as ordered  - Monitor response to electrolyte replacements, including repeat lab results as appropriate  - Instruct patient on fluid and nutrition as appropriate  Outcome: Progressing  Goal: Fluid balance maintained  Description: INTERVENTIONS:  - Monitor labs   - Monitor I/O and WT  - Instruct patient on fluid and nutrition as appropriate  - Assess for signs & symptoms of volume excess or deficit  Outcome: Progressing  Goal: Glucose maintained within target range  Description: INTERVENTIONS:  - Monitor Blood Glucose as ordered  - Assess for signs and symptoms of hyperglycemia and hypoglycemia  - Administer ordered medications to maintain glucose within target range  - Assess nutritional intake and initiate nutrition service referral as needed  Outcome: Progressing

## 2023-02-02 NOTE — ASSESSMENT & PLAN NOTE
History noted  Patient reports alcohol in remission, currently drinking up to a 6 pack of nonalcoholic beers daily

## 2023-02-02 NOTE — PHYSICAL THERAPY NOTE
Physical Therapy Evaluation    Patient Name: Gaby Jimenez    UMIZK'X Date: 2/2/2023     Problem List  Principal Problem:    Hyponatremia  Active Problems:    Hx of seizure disorder    Alcoholic cirrhosis of liver without ascites ([facility] [address] )    Abnormal CXR       Past Medical History  Past Medical History:   Diagnosis Date    Alcohol abuse     Traore esophagus     Bowel obstruction ([facility] [address] )     Bowel perforation ([facility] [address] )     Cardiac disease     Continuous chronic alcoholism ([facility] 10/5/2017    COPD (chronic obstructive pulmonary disease) ([facility] )     History of shoulder surgery     Right shoulder    History of transfusion     Hx of cervical spine surgery     Hypertension     Incisional hernia 10/8/2017    MI, old     Mitral regurgitation     Psychiatric disorder     Seizures ([facility] [address] )         Past Surgical History  Past Surgical History:   Procedure Laterality Date    APPENDECTOMY      BACK SURGERY      CHOLECYSTECTOMY      ESOPHAGOGASTRODUODENOSCOPY N/A 11/28/2016    Procedure: ESOPHAGOGASTRODUODENOSCOPY (EGD); Surgeon: Calli Hoffman MD;  Location:  GI LAB; Service:     GALLBLADDER SURGERY      LAPAROTOMY N/A 10/25/2016    Procedure: LAPAROTOMY EXPLORATORY;  Surgeon: Rod Keen MD;  Location: [facility];  Service:     MOUTH SURGERY      PERCUTANEOUS PINNING FEMORAL NECK FRACTURE      SHOULDER SURGERY Right     SHOULDER SURGERY      SMALL INTESTINE SURGERY      STOMACH SURGERY      bal surgery           02/02/23 1420   PT Last Visit   PT Visit Date 02/02/23   Note Type   Note type Evaluation   Pain Assessment   Pain Assessment Tool 0-10   Pain Score No Pain   Restrictions/Precautions   Weight Bearing Precautions Per Order No   Home Living   Type of Home House   Home Layout Two level;Performs ADLs on one level; Able to live on main level with bedroom/bathroom;Stairs to enter with rails  (12 TIFF c HR)   Bathroom Shower/Tub Walk-in shower   Bathroom Toilet Standard [address] [place] [address] chair   [address] Walker;Cane;Wheelchair-manual   Additional Comments pt denies use of DME at baseline   Prior Function   Level of [place] Independent with ADLs; Independent with functional mobility; Independent with IADLS   Lives With [facility] Help From Family   IADLs Family/Friend/Other provides transportation   Falls in the last 6 months 0   General   Family/Caregiver Present No   Cognition   Overall Cognitive Status WFL   Arousal/Participation Alert   Orientation Level Oriented X4   Following Commands Follows all commands and directions without difficulty   Subjective   Subjective "Who wants to dance?"   RLE Assessment   RLE Assessment WFL   LLE Assessment   LLE Assessment WFL   Bed Mobility   Supine to Sit 7  Independent   Sit to Supine 7  Independent   Transfers   Sit to Stand 7  Independent   Stand to Sit 7  Independent   Additional Comments no device used   Ambulation/Elevation   Gait pattern Wide MCKENZIE; Inconsistent feliberto   Gait Assistance 7  Independent   Assistive Device None   Distance 450'   Balance   Static Sitting Good   Dynamic Sitting Good   Static Standing Good   Dynamic Standing Good   Ambulatory Good   Endurance Deficit   Endurance Deficit No   Activity Tolerance   Activity Tolerance Patient tolerated treatment well   Assessment   Prognosis Good   Assessment Patient is a 65 y o  male evaluated by Physical Therapy s/p admit to [facility] on 2/1/2023 with admitting diagnosis of: Hypokalemia, Hypomagnesemia, Hyponatremia, Low sodium levels, and principal problem of: Hyponatremia  PT was consulted to assess patient's functional mobility and discharge needs  Ordered are PT Evaluation and treatment with activity level of: up with assistance  Comorbidities affecting patient's physical performance at time of assessment include: HTN, hx of seizures, hx of alcohol abuse, COPD, hx of MI, mitral regurgitation  Personal factors affecting the patient at time of IE include: lives in 2 [place] home and step(s) to enter home  Please locate objective findings from PT assessment regarding body systems outlined above  Upon evaluation, pt able to perform all functional mobility independently without assistive device  Seated rest break taken following 450' of ambulation; no true LOB experienced with mobility  HR and SpO2 remained WFL on RA throughout; pt denies SOB or fatigue  Continued PT intervention not indicated at this time; will d/c PT orders  The patient's AM-PAC Basic Mobility Inpatient Short Form Raw Score is 24  A Raw score of greater than 16 suggests the patient may benefit from discharge to home  Please also refer to the recommendation of the Physical Therapist for safe discharge planning  Co treatment with OT secondary to complex medical condition of pt, possible A of 2 required to achieve and maintain transitional movements, requiring the need of skilled therapeutic intervention of 2 therapists to achieve delivery of services  Goals   Patient Goals to go home   Plan   PT Frequency Other (Comment)  (eval only)   Recommendation   PT Discharge Recommendation Home with outpatient rehabilitation  (continued)   AM-PAC Basic Mobility Inpatient   Turning in Flat Bed Without Bedrails 4   Lying on Back to Sitting on Edge of Flat Bed Without Bedrails 4   Moving Bed to Chair 4   Standing Up From Chair Using Arms 4   Walk in Room 4   Climb 3-5 Stairs With Railing 4   Basic Mobility Inpatient Raw Score 24   Basic Mobility Standardized Score 57 68   Highest Level Of Mobility   JH-HLM Goal 8: Walk 250 feet or more   JH-HLM Achieved 8: Walk 250 feet ot more   End of Consult   Patient Position at End of Consult Supine; All needs within reach

## 2023-02-02 NOTE — PROGRESS NOTES
[facility] [address]  Progress Note - Angelito Celeste 1965, 65 y o  male MRN: [id number]  Unit/Bed#: 406-01 Encounter: [id number]  Primary Care Provider: Beatriz Harris DO   Date and time admitted to hospital: 2/1/2023  6:14 AM    * Hyponatremia  Assessment & Plan  History of chronic hyponatremia, currently worsened  Sodium of 119 at time of presentation, asymptomatic  Baseline appears to be between 120-125, with a goal of 125-129 according to last nephrology note  Previously attributed to adrenal insufficiency, this was ruled out by endocrinology with patient having a normal fasting cortisol and ACTH levels off steroids  Etiology potentially insufficient solute/Beer Potomania v cirrhosis  Patient does endorse noncompliance with fluid restriction -he is taking in at minimum over 100 ounces of fluid daily, including 6 nonalcoholic beers, which translates to over 3 L of fluid intake per day  Patient was fluid restricted, administered 500 cc of normal saline, with sodium only improving to 120 this morning     Continue home Bumex for now  Patient was intolerant of salt tablets (nausea and vomiting), will reattempt with hospital formulary to assess for tolerance  Nephrology following  Alcoholic cirrhosis of liver without ascites ([facility] [address] )  Assessment & Plan  History noted  Patient reports alcohol in remission, currently drinking up to a 6 pack of nonalcoholic beers daily  Hx of seizure disorder  Assessment & Plan  Prior episodes of seizures with normal EEGs  Potentially provoked by EtOH withdrawal according to neurology  Continue Keppra  Abnormal CXR  Assessment & Plan  CXR with increased density at the right lung apex, potentially representing callus formation  Patient does have a history of falls with rib and clavicular fractures in the past   We will check CT for confirmation        VTE Pharmacologic Prophylaxis: VTE Score: 1 Moderate Risk (Score 3-4) - Pharmacological DVT Prophylaxis Ordered: heparin  Patient Centered Rounds: I performed bedside rounds with nursing staff today  Discussions with Specialists or Other Care Team Provider: Review of nephrology note    Time Spent for Care: 30 minutes  More than 50% of total time spent on counseling and coordination of care as described above  Current Length of Stay: 1 day(s)  Current Patient Status: Inpatient   Certification Statement: The patient will continue to require additional inpatient hospital stay due to Continue treatment of hyponatremia  Discharge Plan: Anticipate discharge in 24-48 hrs to home  Code Status: Level 1 - Full Code    Subjective:   Patient seen and examined, has no acute complaints  Sodium improved but only to a value of 120 this morning  No overnight events were reported  Remains afebrile  Objective:     Vitals:   Temp (24hrs), Av 3 °F (36 8 °C), Min:97 7 °F (36 5 °C), Max:98 7 °F (37 1 °C)    Temp:  [97 7 °F (36 5 °C)-98 7 °F (37 1 °C)] 97 7 °F (36 5 °C)  HR:  [71-86] 73  Resp:  [16-18] 18  BP: ()/(65-72) 107/72  SpO2:  [92 %-95 %] 92 %  Body mass index is 18 81 kg/m²  Input and Output Summary (last 24 hours): Intake/Output Summary (Last 24 hours) at 2023 0951  Last data filed at 2023 0701  Gross per 24 hour   Intake 838 ml   Output 650 ml   Net 188 ml       Physical Exam:   Vitals  Constitutional:       General: He is not in acute distress  Appearance: He is well-developed  He is not ill-appearing or toxic-appearing  Comments: Thin in appearance  Cardiovascular:      Rate and Rhythm: Normal rate and regular rhythm  Heart sounds: No murmur heard  Pulmonary:      Effort: Pulmonary effort is normal  No respiratory distress  Breath sounds: Normal breath sounds  No wheezing or rales  Abdominal:      General: Bowel sounds are normal  There is no distension  Palpations: Abdomen is soft  Tenderness: There is no abdominal tenderness   There is no guarding or rebound  Musculoskeletal:         General: No swelling or tenderness  Cervical back: Neck supple  Skin:     General: Skin is warm and dry  Neurological:      General: No focal deficit present  Mental Status: He is alert and oriented to person, place, and time  Psychiatric:         Mood and Affect: Mood normal          Behavior: Behavior normal      Additional Data:     Labs:  Results from last 7 days   Lab Units 02/02/23  0505   WBC Thousand/uL 6 57   HEMOGLOBIN g/dL 12 1   HEMATOCRIT % 34 1*   PLATELETS Thousands/uL 178   NEUTROS PCT % 58   LYMPHS PCT % 26   MONOS PCT % 14*   EOS PCT % 1     Results from last 7 days   Lab Units 02/02/23  0505 02/01/23  1814 02/01/23  0626   SODIUM mmol/L 120*   < > 119*   POTASSIUM mmol/L 4 7   < > 3 3*   CHLORIDE mmol/L 90*   < > 81*   CO2 mmol/L 26   < > 27   BUN mg/dL 20   < > 22   CREATININE mg/dL 0 81   < > 0 86   ANION GAP mmol/L 4   < > 11   CALCIUM mg/dL 8 5   < > 9 3   ALBUMIN g/dL  --   --  4 6   TOTAL BILIRUBIN mg/dL  --   --  0 42   ALK PHOS U/L  --   --  95   ALT U/L  --   --  10   AST U/L  --   --  24   GLUCOSE RANDOM mg/dL 92   < > 53*    < > = values in this interval not displayed           Results from last 7 days   Lab Units 02/02/23  0724 02/01/23  1557 02/01/23  1127   POC GLUCOSE mg/dl 98 82 95               Lines/Drains:  Invasive Devices     Peripheral Intravenous Line  Duration           Peripheral IV 02/01/23 Right;Ventral (anterior) Forearm <1 day                      Imaging: Reviewed radiology reports from this admission including: chest xray    Recent Cultures (last 7 days):         Last 24 Hours Medication List:   Current Facility-Administered Medications   Medication Dose Route Frequency Provider Last Rate   • cholecalciferol  1,000 Units Oral Daily Tracy Lane MD     • cyanocobalamin  100 mcg Oral Daily Tracy Lane MD     • diphenhydrAMINE  25 mg Oral Q8H PRN Tracy Lane MD     • docusate sodium  100 mg Oral BID PRN Mei Caldwell MD     • folic acid  1 mg Oral Daily Mei Caldwell MD     • heparin (porcine)  5,000 Units Subcutaneous [facility] Mei Caldwell MD     • levETIRAcetam  1,000 mg Oral Q12H [facility] Mei Caldwell MD     • LORazepam  0 5 mg Oral HS Mei Caldwell MD     • multivitamin-minerals  1 tablet Oral Daily Mei Caldwell MD     • nicotine  1 patch Transdermal Daily Mei Caldwell MD     • ondansetron  4 mg Intravenous Q6H PRN Mei Caldwell MD     • sodium chloride  1 g Oral TID With Meals Mei Caldwell MD     • thiamine  100 mg Oral Daily Mei Caldwell MD          Today, Patient Was Seen By: Mei Caldwell MD    **Please Note: This note may have been constructed using a voice recognition system  **

## 2023-02-02 NOTE — PLAN OF CARE
Problem: PAIN - ADULT  Goal: Verbalizes/displays adequate comfort level or baseline comfort level  Description: Interventions:  - Encourage patient to monitor pain and request assistance  - Assess pain using appropriate pain scale  - Administer analgesics based on type and severity of pain and evaluate response  - Implement non-pharmacological measures as appropriate and evaluate response  - Consider cultural and social influences on pain and pain management  - Notify physician/advanced practitioner if interventions unsuccessful or patient reports new pain  Outcome: Progressing     Problem: INFECTION - ADULT  Goal: Absence or prevention of progression during hospitalization  Description: INTERVENTIONS:  - Assess and monitor for signs and symptoms of infection  - Monitor lab/diagnostic results  - Monitor all insertion sites, i e  indwelling lines, tubes, and drains  - Monitor endotracheal if appropriate and nasal secretions for changes in amount and color  - [place] appropriate cooling/warming therapies per order  - Administer medications as ordered  - Instruct and encourage patient and family to use good hand hygiene technique  - Identify and instruct in appropriate isolation precautions for identified infection/condition  Outcome: Progressing

## 2023-02-03 ENCOUNTER — TELEPHONE (OUTPATIENT)
Dept: GASTROENTEROLOGY | Facility: CLINIC | Age: 58
End: 2023-02-03

## 2023-02-03 ENCOUNTER — TELEPHONE (OUTPATIENT)
Dept: OTHER | Facility: HOSPITAL | Age: 58
End: 2023-02-03

## 2023-02-03 ENCOUNTER — TRANSITIONAL CARE MANAGEMENT (OUTPATIENT)
Dept: FAMILY MEDICINE CLINIC | Facility: CLINIC | Age: 58
End: 2023-02-03

## 2023-02-03 VITALS
SYSTOLIC BLOOD PRESSURE: 104 MMHG | RESPIRATION RATE: 16 BRPM | HEART RATE: 94 BPM | OXYGEN SATURATION: 96 % | HEIGHT: 64 IN | DIASTOLIC BLOOD PRESSURE: 75 MMHG | WEIGHT: 113.76 LBS | BODY MASS INDEX: 19.42 KG/M2 | TEMPERATURE: 99.2 F

## 2023-02-03 PROBLEM — E44.0 MODERATE PROTEIN-CALORIE MALNUTRITION (HCC): Status: ACTIVE | Noted: 2023-02-03

## 2023-02-03 PROBLEM — K22.9 ESOPHAGEAL ABNORMALITY: Status: ACTIVE | Noted: 2023-02-03

## 2023-02-03 LAB
ANION GAP SERPL CALCULATED.3IONS-SCNC: 6 MMOL/L (ref 4–13)
BASOPHILS # BLD AUTO: 0.03 THOUSANDS/ÂΜL (ref 0–0.1)
BASOPHILS NFR BLD AUTO: 1 % (ref 0–1)
BUN SERPL-MCNC: 14 MG/DL (ref 5–25)
CALCIUM SERPL-MCNC: 8.2 MG/DL (ref 8.4–10.2)
CHLORIDE SERPL-SCNC: 97 MMOL/L (ref 96–108)
CO2 SERPL-SCNC: 23 MMOL/L (ref 21–32)
CREAT SERPL-MCNC: 0.66 MG/DL (ref 0.6–1.3)
EOSINOPHIL # BLD AUTO: 0.09 THOUSAND/ÂΜL (ref 0–0.61)
EOSINOPHIL NFR BLD AUTO: 2 % (ref 0–6)
ERYTHROCYTE [DISTWIDTH] IN BLOOD BY AUTOMATED COUNT: 13.1 % (ref 11.6–15.1)
GFR SERPL CREATININE-BSD FRML MDRD: 108 ML/MIN/1.73SQ M
GLUCOSE SERPL-MCNC: 119 MG/DL (ref 65–140)
GLUCOSE SERPL-MCNC: 161 MG/DL (ref 65–140)
GLUCOSE SERPL-MCNC: 85 MG/DL (ref 65–140)
HCT VFR BLD AUTO: 32.4 % (ref 36.5–49.3)
HGB BLD-MCNC: 11.3 G/DL (ref 12–17)
IMM GRANULOCYTES # BLD AUTO: 0.01 THOUSAND/UL (ref 0–0.2)
IMM GRANULOCYTES NFR BLD AUTO: 0 % (ref 0–2)
LYMPHOCYTES # BLD AUTO: 1.16 THOUSANDS/ÂΜL (ref 0.6–4.47)
LYMPHOCYTES NFR BLD AUTO: 24 % (ref 14–44)
MAGNESIUM SERPL-MCNC: 1.8 MG/DL (ref 1.9–2.7)
MCH RBC QN AUTO: 34.7 PG (ref 26.8–34.3)
MCHC RBC AUTO-ENTMCNC: 34.9 G/DL (ref 31.4–37.4)
MCV RBC AUTO: 99 FL (ref 82–98)
MONOCYTES # BLD AUTO: 0.69 THOUSAND/ÂΜL (ref 0.17–1.22)
MONOCYTES NFR BLD AUTO: 14 % (ref 4–12)
NEUTROPHILS # BLD AUTO: 2.91 THOUSANDS/ÂΜL (ref 1.85–7.62)
NEUTS SEG NFR BLD AUTO: 59 % (ref 43–75)
NRBC BLD AUTO-RTO: 0 /100 WBCS
PLATELET # BLD AUTO: 150 THOUSANDS/UL (ref 149–390)
PMV BLD AUTO: 9.2 FL (ref 8.9–12.7)
POTASSIUM SERPL-SCNC: 4.3 MMOL/L (ref 3.5–5.3)
RBC # BLD AUTO: 3.26 MILLION/UL (ref 3.88–5.62)
SODIUM SERPL-SCNC: 126 MMOL/L (ref 135–147)
WBC # BLD AUTO: 4.89 THOUSAND/UL (ref 4.31–10.16)

## 2023-02-03 RX ORDER — SODIUM CHLORIDE 1000 MG
1 TABLET, SOLUBLE MISCELLANEOUS
Qty: 90 TABLET | Refills: 0 | Status: ON HOLD | OUTPATIENT
Start: 2023-02-03

## 2023-02-03 RX ORDER — LANOLIN ALCOHOL/MO/W.PET/CERES
400 CREAM (GRAM) TOPICAL ONCE
Status: COMPLETED | OUTPATIENT
Start: 2023-02-03 | End: 2023-02-03

## 2023-02-03 RX ADMIN — DIPHENHYDRAMINE HYDROCHLORIDE 25 MG: 25 TABLET ORAL at 08:10

## 2023-02-03 RX ADMIN — Medication 100 MCG: at 08:04

## 2023-02-03 RX ADMIN — MULTIPLE VITAMINS W/ MINERALS TAB 1 TABLET: TAB at 08:04

## 2023-02-03 RX ADMIN — FOLIC ACID 1 MG: 1 TABLET ORAL at 08:04

## 2023-02-03 RX ADMIN — CHOLECALCIFEROL TAB 25 MCG (1000 UNIT) 1000 UNITS: 25 TAB at 08:04

## 2023-02-03 RX ADMIN — LEVETIRACETAM 1000 MG: 500 TABLET, FILM COATED ORAL at 08:04

## 2023-02-03 RX ADMIN — THIAMINE HCL TAB 100 MG 100 MG: 100 TAB at 08:04

## 2023-02-03 RX ADMIN — SODIUM CHLORIDE TAB 1 GM 1 G: 1 TAB at 11:35

## 2023-02-03 RX ADMIN — SODIUM CHLORIDE TAB 1 GM 1 G: 1 TAB at 08:04

## 2023-02-03 RX ADMIN — Medication 400 MG: at 10:07

## 2023-02-03 RX ADMIN — HEPARIN SODIUM 5000 UNITS: 5000 INJECTION INTRAVENOUS; SUBCUTANEOUS at 05:27

## 2023-02-03 NOTE — DISCHARGE SUMMARY
[facility] [address]  Discharge- Inell Honey 1965, 65 y o  male MRN: [id number]  Unit/Bed#: 406-01 Encounter: [id number]  Primary Care Provider: Esa Dyer DO   Date and time admitted to hospital: 2/1/2023  6:14 AM    * Hyponatremia  Assessment & Plan  History of chronic hyponatremia, worsened  Sodium of 119 at time of presentation, asymptomatic  Baseline appears to be between 120-125, with a goal of 125-129 according to last nephrology note  Current value has improved to 126  Previously attributed to adrenal insufficiency, this was ruled out by endocrinology with patient having a normal fasting cortisol and ACTH levels off steroids  Etiology potentially insufficient solute/Beer Potomania v cirrhosis  Patient does endorse noncompliance with fluid restriction - he is taking in at minimum over 100 ounces of fluid daily, including 6 12 ounce nonalcoholic beers and large Gatorade  Patient was fluid restricted, administered 500 cc of normal saline, with sodium only improving to 120 previously  Previous trial of salt tablets resulted in nausea and vomiting, repeat attempt tolerated with improvement in sodium noted  Plan according to discussion with nephrology is for discharge on sodium tablets for now, with plans for tapering off as outpatient, fluid restriction, and continuation of home Bumex  Alcoholic cirrhosis of liver without ascites ([facility] [address] )  Assessment & Plan  History noted  Patient reports alcohol in remission, currently drinking up to a 6 pack of nonalcoholic beers daily  Hx of seizure disorder  Assessment & Plan  Prior episodes of seizures with normal EEGs  Potentially provoked by EtOH withdrawal according to neurology  Continue Keppra  Abnormal CXR  Assessment & Plan  CXR with increased density at the right lung apex, potentially representing callus formation    Patient does have a history of falls with rib and clavicular fractures in the past   CT confirms healed fracture of the medial right clavicle with bridging callus accounting for abnormality seen on CXR  Pancreatic pseudocyst  Assessment & Plan  Known history of pancreatic pseudocyst, increased in size since prior imaging in May 2022  Also with mild dilation of the main pancreatic duct, likely due to mass-effect from pseudocyst   Patient is asymptomatic, and has no complaints of epigastric pain  Will ensure follow-up with GI as outpatient for continued monitoring, intervention and further testing if deemed appropriate  Esophageal abnormality  Assessment & Plan  Mention of a mildly patulous esophagus with air-fluid levels  Has a history of Traore's esophagus and GERD, but currently without any symptoms  Follow-up with GI as outpatient  Moderate protein-calorie malnutrition ([address] )  Assessment & Plan  Malnutrition Findings:   Adult Malnutrition type: Chronic illness  Adult Degree of Malnutrition: Malnutrition of moderate degree  Malnutrition Characteristics: Fat loss, Muscle loss    360 Statement: Chronic, moderate protein-calorie malnutrition related to chronic illness as evidenced by mild muscle wasting of clavicles and temples and mild subcutaneous fat wasting of buccal regions and upper extremities  Treating with regular PO diet  Patient is not interested in nutrition supplements at this time but reports he drinks Ensure at home  BMI Findings: Body mass index is 19 53 kg/m²           Medical Problems     Resolved Problems  Date Reviewed: 2/3/2023   None       Discharging Physician / Practitioner: Ariadna Sahu MD  PCP: Jasmine Licea DO  Admission Date:   Admission Orders (From admission, onward)     Ordered        02/01/23 0804  INPATIENT ADMISSION  Once                      Discharge Date: 02/03/23    Consultations During Hospital Stay:  · Nephrology    Procedures Performed:   · None    Significant Findings / Test Results:   XR chest 1 view portable    Result Date: 2/1/2023  Impression: No acute cardiopulmonary disease  Increased density at the right lung apex may represent callus formation  Given history, confirmatory nonemergent CT should be considered  The study was marked in EPIC for significant notification  Workstation performed: [id number]     CT chest wo contrast    Result Date: 2/2/2023  Impression: 1  No suspicious lung mass or right apical lung mass  2   Healed fracture of the medial right clavicle with bridging callus, which accounts for the abnormality seen on recent chest radiograph 2/1/2023   3   Increased size of the partially visualized 6 9 cm pancreatic head pseudocyst since 5/23/2022 and new mild dilatation of the main pancreatic duct, likely due to mass effect from the pancreatic head pseudocyst   Recommend correlation with lipase  4   Mildly patulous esophagus with air-fluid levels  The study was marked in [facility] for immediate notification  Workstation performed: [id number]     Incidental Findings:   · As above  · I reviewed the above mentioned incidental findings with the patient and/or family and they expressed understanding  Test Results Pending at Discharge (will require follow up): · None     Outpatient Tests Requested:  · BMP    Complications: None    Reason for Admission: Hyponatremia    HPI from original H&P on 2/1/2023:   Maricel Lo is a 65 y o  male with a PMH of chronic hyponatremia and cirrhosis who presents with noted low sodium on outpatient labs      Mr Supa Link has a past medical history significant for alcoholic cirrhosis, reported seizure disorder which may have been secondary to prior EtOH abuse, HTN, prior iron deficiency anemia with a normal hemoglobin currently, and osteoporosis  The patient also has a history of chronic hyponatremia - this was previously attributed to adrenal insufficiency, ruled out with a normal fasting cortisol and ACTH level off steroids   Nephrology believes etiology may be patient's underlying cirrhosis, insufficient solute intake  The patient's reports being intolerant of sodium tablets, and also admits to a degree of noncompliance with fluid restriction  Outpatient labs returned with a sodium that was low at 119, with the patient referred to the ED for admission for further evaluation and treatment  Please see above list of diagnoses and related plan for additional information  Condition at Discharge: stable    Discharge Day Visit / Exam:     Vitals: Blood Pressure: 104/75 (02/03/23 0757)  Pulse: 94 (02/03/23 0757)  Temperature: 99 2 °F (37 3 °C) (02/03/23 0757)  Temp Source: Oral (02/01/23 2307)  Respirations: 16 (02/02/23 2236)  Height: 5' 4" (162 6 cm) (02/01/23 1103)  Weight - Scale: 51 6 kg (113 lb 12 1 oz) (02/03/23 0600)  SpO2: 96 % (02/03/23 0757)    Discharge instructions/Information to patient and family:   See after visit summary for information provided to patient and family  Provisions for Follow-Up Care:  See after visit summary for information related to follow-up care and any pertinent home health orders  Disposition:   Home    Planned Readmission: No     Discharge Statement:  I spent 35 minutes discharging the patient  This time was spent on the day of discharge  I had direct contact with the patient on the day of discharge  Greater than 50% of the total time was spent examining patient, answering all patient questions, arranging and discussing plan of care with patient as well as directly providing post-discharge instructions  Additional time then spent on discharge activities  Discharge Medications:  See after visit summary for reconciled discharge medications provided to patient and/or family        **Please Note: This note may have been constructed using a voice recognition system**

## 2023-02-03 NOTE — NURSING NOTE
AVS printed and gone over with patient  Patient voiced understanding and had no questions  Pt left floor via walking in stable condition

## 2023-02-03 NOTE — ASSESSMENT & PLAN NOTE
CXR with increased density at the right lung apex, potentially representing callus formation  Patient does have a history of falls with rib and clavicular fractures in the past   CT confirms healed fracture of the medial right clavicle with bridging callus accounting for abnormality seen on CXR

## 2023-02-03 NOTE — ASSESSMENT & PLAN NOTE
Malnutrition Findings:   Adult Malnutrition type: Chronic illness  Adult Degree of Malnutrition: Malnutrition of moderate degree  Malnutrition Characteristics: Fat loss, Muscle loss    360 Statement: Chronic, moderate protein-calorie malnutrition related to chronic illness as evidenced by mild muscle wasting of clavicles and temples and mild subcutaneous fat wasting of buccal regions and upper extremities  Treating with regular PO diet  Patient is not interested in nutrition supplements at this time but reports he drinks Ensure at home  BMI Findings: Body mass index is 19 53 kg/m²

## 2023-02-03 NOTE — ASSESSMENT & PLAN NOTE
Mention of a mildly patulous esophagus with air-fluid levels  Has a history of Traore's esophagus and GERD, but currently without any symptoms  Follow-up with GI as outpatient

## 2023-02-03 NOTE — ASSESSMENT & PLAN NOTE
History of chronic hyponatremia, worsened  Sodium of 119 at time of presentation, asymptomatic  Baseline appears to be between 120-125, with a goal of 125-129 according to last nephrology note  Current value has improved to 126  Previously attributed to adrenal insufficiency, this was ruled out by endocrinology with patient having a normal fasting cortisol and ACTH levels off steroids  Etiology potentially insufficient solute/Beer Potomania v cirrhosis  Patient does endorse noncompliance with fluid restriction - he is taking in at minimum over 100 ounces of fluid daily, including 6 12 ounce nonalcoholic beers and large Gatorade  Patient was fluid restricted, administered 500 cc of normal saline, with sodium only improving to 120 previously  Previous trial of salt tablets resulted in nausea and vomiting, repeat attempt tolerated with improvement in sodium noted  Plan according to discussion with nephrology is for discharge on sodium tablets for now, with plans for tapering off as outpatient, fluid restriction, and continuation of home Bumex

## 2023-02-03 NOTE — PLAN OF CARE
Problem: PAIN - ADULT  Goal: Verbalizes/displays adequate comfort level or baseline comfort level  Description: Interventions:  - Encourage patient to monitor pain and request assistance  - Assess pain using appropriate pain scale  - Administer analgesics based on type and severity of pain and evaluate response  - Implement non-pharmacological measures as appropriate and evaluate response  - Consider cultural and social influences on pain and pain management  - Notify physician/advanced practitioner if interventions unsuccessful or patient reports new pain  Outcome: Progressing     Problem: INFECTION - ADULT  Goal: Absence or prevention of progression during hospitalization  Description: INTERVENTIONS:  - Assess and monitor for signs and symptoms of infection  - Monitor lab/diagnostic results  - Monitor all insertion sites, i e  indwelling lines, tubes, and drains  - Monitor endotracheal if appropriate and nasal secretions for changes in amount and color  - [place] appropriate cooling/warming therapies per order  - Administer medications as ordered  - Instruct and encourage patient and family to use good hand hygiene technique  - Identify and instruct in appropriate isolation precautions for identified infection/condition  Outcome: Progressing     Problem: SAFETY ADULT  Goal: Patient will remain free of falls  Description: INTERVENTIONS:  - Educate patient/family on patient safety including physical limitations  - Instruct patient to call for assistance with activity   - Consult OT/PT to assist with strengthening/mobility   - Keep Call bell within reach  - Keep bed low and locked with side rails adjusted as appropriate  - Keep care items and personal belongings within reach  - Initiate and maintain comfort rounds  - Make Fall Risk Sign visible to staff  - Offer Toileting every 1-2 Hours, in advance of need  - Initiate/Maintain bed and chair alarm  - Obtain necessary fall risk management equipment: fall socks and call bell in reach  - Apply yellow socks and bracelet for high fall risk patients  - Consider moving patient to room near nurses station  Outcome: Progressing  Goal: Maintain or return to baseline ADL function  Description: INTERVENTIONS:  -  Assess patient's ability to carry out ADLs; assess patient's baseline for ADL function and identify physical deficits which impact ability to perform ADLs (bathing, care of mouth/teeth, toileting, grooming, dressing, etc )  - Assess/evaluate cause of self-care deficits   - Assess range of motion  - Assess patient's mobility; develop plan if impaired  - Assess patient's need for assistive devices and provide as appropriate  - Encourage maximum independence but intervene and supervise when necessary  - Involve family in performance of ADLs  - Assess for home care needs following discharge   - Consider OT consult to assist with ADL evaluation and planning for discharge  - Provide patient education as appropriate  Outcome: Progressing  Goal: Maintains/Returns to pre admission functional level  Description: INTERVENTIONS:  - Perform BMAT or MOVE assessment daily    - Set and communicate daily mobility goal to care team and patient/family/caregiver  - Collaborate with rehabilitation services on mobility goals if consulted  - Perform Range of Motion 3-4 times a day  - Reposition patient every 1-2 hours    - Dangle patient 3-4 times a day  - Stand patient 3-4 times a day  - Ambulate patient 3-4 times a day  - Out of bed to chair 3-4 times a day   - Out of bed for meals 3-4 times a day  - Out of bed for toileting  - Record patient progress and toleration of activity level   Outcome: Progressing     Problem: DISCHARGE PLANNING  Goal: Discharge to home or other facility with appropriate resources  Description: INTERVENTIONS:  - Identify barriers to discharge w/patient and caregiver  - Arrange for needed discharge resources and transportation as appropriate  - Identify discharge learning needs (meds, wound care, etc )  - Arrange for interpretive services to assist at discharge as needed  - Refer to Case Management Department for coordinating discharge planning if the patient needs post-hospital services based on physician/advanced practitioner order or complex needs related to functional status, cognitive ability, or social support system  Outcome: Progressing     Problem: Knowledge Deficit  Goal: Patient/family/caregiver demonstrates understanding of disease process, treatment plan, medications, and discharge instructions  Description: Complete learning assessment and assess knowledge base    Interventions:  - Provide teaching at level of understanding  - Provide teaching via preferred learning methods  Outcome: Progressing     Problem: METABOLIC, FLUID AND ELECTROLYTES - ADULT  Goal: Electrolytes maintained within normal limits  Description: INTERVENTIONS:  - Monitor labs and assess patient for signs and symptoms of electrolyte imbalances  - Administer electrolyte replacement as ordered  - Monitor response to electrolyte replacements, including repeat lab results as appropriate  - Instruct patient on fluid and nutrition as appropriate  Outcome: Progressing  Goal: Fluid balance maintained  Description: INTERVENTIONS:  - Monitor labs   - Monitor I/O and WT  - Instruct patient on fluid and nutrition as appropriate  - Assess for signs & symptoms of volume excess or deficit  Outcome: Progressing  Goal: Glucose maintained within target range  Description: INTERVENTIONS:  - Monitor Blood Glucose as ordered  - Assess for signs and symptoms of hyperglycemia and hypoglycemia  - Administer ordered medications to maintain glucose within target range  - Assess nutritional intake and initiate nutrition service referral as needed  Outcome: Progressing     Problem: Nutrition/Hydration-ADULT  Goal: Nutrient/Hydration intake appropriate for improving, restoring or maintaining nutritional needs  Description: Monitor and assess patient's nutrition/hydration status for malnutrition  Collaborate with interdisciplinary team and initiate plan and interventions as ordered  Monitor patient's weight and dietary intake as ordered or per policy  Utilize nutrition screening tool and intervene as necessary  Determine patient's food preferences and provide high-protein, high-caloric foods as appropriate       INTERVENTIONS:  - Monitor oral intake, urinary output, labs, and treatment plans  - Assess nutrition and hydration status and recommend course of action  - Evaluate amount of meals eaten  - Assist patient with eating if necessary   - Allow adequate time for meals  - Recommend/ encourage appropriate diets, oral nutritional supplements, and vitamin/mineral supplements  - Order, calculate, and assess calorie counts as needed  - Recommend, monitor, and adjust tube feedings and TPN/PPN based on assessed needs  - Assess need for intravenous fluids  - Provide specific nutrition/hydration education as appropriate  - Include patient/family/caregiver in decisions related to nutrition  Outcome: Progressing

## 2023-02-03 NOTE — PLAN OF CARE
Problem: PAIN - ADULT  Goal: Verbalizes/displays adequate comfort level or baseline comfort level  Description: Interventions:  - Encourage patient to monitor pain and request assistance  - Assess pain using appropriate pain scale  - Administer analgesics based on type and severity of pain and evaluate response  - Implement non-pharmacological measures as appropriate and evaluate response  - Consider cultural and social influences on pain and pain management  - Notify physician/advanced practitioner if interventions unsuccessful or patient reports new pain  Outcome: Progressing     Problem: INFECTION - ADULT  Goal: Absence or prevention of progression during hospitalization  Description: INTERVENTIONS:  - Assess and monitor for signs and symptoms of infection  - Monitor lab/diagnostic results  - Monitor all insertion sites, i e  indwelling lines, tubes, and drains  - Monitor endotracheal if appropriate and nasal secretions for changes in amount and color  - [place] appropriate cooling/warming therapies per order  - Administer medications as ordered  - Instruct and encourage patient and family to use good hand hygiene technique  - Identify and instruct in appropriate isolation precautions for identified infection/condition  Outcome: Progressing     Problem: SAFETY ADULT  Goal: Patient will remain free of falls  Description: INTERVENTIONS:  - Educate patient/family on patient safety including physical limitations  - Instruct patient to call for assistance with activity   - Consult OT/PT to assist with strengthening/mobility   - Keep Call bell within reach  - Keep bed low and locked with side rails adjusted as appropriate  - Keep care items and personal belongings within reach  - Initiate and maintain comfort rounds  - Make Fall Risk Sign visible to staff  - Offer Toileting every 1-2 Hours, in advance of need  - Initiate/Maintain bed and chair alarm  - Obtain necessary fall risk management equipment: fall socks and call bell in reach  - Apply yellow socks and bracelet for high fall risk patients  - Consider moving patient to room near nurses station  Outcome: Progressing  Goal: Maintain or return to baseline ADL function  Description: INTERVENTIONS:  -  Assess patient's ability to carry out ADLs; assess patient's baseline for ADL function and identify physical deficits which impact ability to perform ADLs (bathing, care of mouth/teeth, toileting, grooming, dressing, etc )  - Assess/evaluate cause of self-care deficits   - Assess range of motion  - Assess patient's mobility; develop plan if impaired  - Assess patient's need for assistive devices and provide as appropriate  - Encourage maximum independence but intervene and supervise when necessary  - Involve family in performance of ADLs  - Assess for home care needs following discharge   - Consider OT consult to assist with ADL evaluation and planning for discharge  - Provide patient education as appropriate  Outcome: Progressing  Goal: Maintains/Returns to pre admission functional level  Description: INTERVENTIONS:  - Perform BMAT or MOVE assessment daily    - Set and communicate daily mobility goal to care team and patient/family/caregiver  - Collaborate with rehabilitation services on mobility goals if consulted  - Perform Range of Motion 3-4 times a day  - Reposition patient every 1-2 hours    - Dangle patient 3-4 times a day  - Stand patient 3-4 times a day  - Ambulate patient 3-4 times a day  - Out of bed to chair 3-4 times a day   - Out of bed for meals 3-4 times a day  - Out of bed for toileting  - Record patient progress and toleration of activity level   Outcome: Progressing     Problem: DISCHARGE PLANNING  Goal: Discharge to home or other facility with appropriate resources  Description: INTERVENTIONS:  - Identify barriers to discharge w/patient and caregiver  - Arrange for needed discharge resources and transportation as appropriate  - Identify discharge learning needs (meds, wound care, etc )  - Arrange for interpretive services to assist at discharge as needed  - Refer to Case Management Department for coordinating discharge planning if the patient needs post-hospital services based on physician/advanced practitioner order or complex needs related to functional status, cognitive ability, or social support system  Outcome: Progressing     Problem: Knowledge Deficit  Goal: Patient/family/caregiver demonstrates understanding of disease process, treatment plan, medications, and discharge instructions  Description: Complete learning assessment and assess knowledge base    Interventions:  - Provide teaching at level of understanding  - Provide teaching via preferred learning methods  Outcome: Progressing     Problem: METABOLIC, FLUID AND ELECTROLYTES - ADULT  Goal: Electrolytes maintained within normal limits  Description: INTERVENTIONS:  - Monitor labs and assess patient for signs and symptoms of electrolyte imbalances  - Administer electrolyte replacement as ordered  - Monitor response to electrolyte replacements, including repeat lab results as appropriate  - Instruct patient on fluid and nutrition as appropriate  Outcome: Progressing  Goal: Fluid balance maintained  Description: INTERVENTIONS:  - Monitor labs   - Monitor I/O and WT  - Instruct patient on fluid and nutrition as appropriate  - Assess for signs & symptoms of volume excess or deficit  Outcome: Progressing  Goal: Glucose maintained within target range  Description: INTERVENTIONS:  - Monitor Blood Glucose as ordered  - Assess for signs and symptoms of hyperglycemia and hypoglycemia  - Administer ordered medications to maintain glucose within target range  - Assess nutritional intake and initiate nutrition service referral as needed  Outcome: Progressing     Problem: Nutrition/Hydration-ADULT  Goal: Nutrient/Hydration intake appropriate for improving, restoring or maintaining nutritional needs  Description: Monitor and assess patient's nutrition/hydration status for malnutrition  Collaborate with interdisciplinary team and initiate plan and interventions as ordered  Monitor patient's weight and dietary intake as ordered or per policy  Utilize nutrition screening tool and intervene as necessary  Determine patient's food preferences and provide high-protein, high-caloric foods as appropriate       INTERVENTIONS:  - Monitor oral intake, urinary output, labs, and treatment plans  - Assess nutrition and hydration status and recommend course of action  - Evaluate amount of meals eaten  - Assist patient with eating if necessary   - Allow adequate time for meals  - Recommend/ encourage appropriate diets, oral nutritional supplements, and vitamin/mineral supplements  - Order, calculate, and assess calorie counts as needed  - Recommend, monitor, and adjust tube feedings and TPN/PPN based on assessed needs  - Assess need for intravenous fluids  - Provide specific nutrition/hydration education as appropriate  - Include patient/family/caregiver in decisions related to nutrition  Outcome: Progressing

## 2023-02-03 NOTE — ASSESSMENT & PLAN NOTE
Known history of pancreatic pseudocyst, increased in size since prior imaging in May 2022  Also with mild dilation of the main pancreatic duct, likely due to mass-effect from pseudocyst   Patient is asymptomatic, and has no complaints of epigastric pain  Will ensure follow-up with GI as outpatient for continued monitoring, intervention and further testing if deemed appropriate

## 2023-02-03 NOTE — PROGRESS NOTES
NEPHROLOGY PROGRESS NOTE   Jevon Bustamante 65 y o  male MRN: [id number]  Unit/Bed#: 406-01 Encounter: [id number]    Assessment/Plan:            ROS  Examined sitting in bed  Did not tolerate high dose salt tabs due to nausea  A complete 10 point review of systems have been performed and are otherwise negative  Historical Information   Past Medical History:   Diagnosis Date   • Alcohol abuse    • Traore esophagus    • Bowel obstruction (HCC)    • Bowel perforation (HCC)    • Cardiac disease    • Continuous chronic alcoholism ([facility] [address] ) 10/5/2017   • COPD (chronic obstructive pulmonary disease) (HCC)    • History of shoulder surgery     Right shoulder   • History of transfusion    • Hx of cervical spine surgery    • Hypertension    • Incisional hernia 10/8/2017   • MI, old    • Mitral regurgitation    • Psychiatric disorder    • Seizures ([facility] [address] )      Past Surgical History:   Procedure Laterality Date   • APPENDECTOMY     • BACK SURGERY     • CHOLECYSTECTOMY     • ESOPHAGOGASTRODUODENOSCOPY N/A 11/28/2016    Procedure: ESOPHAGOGASTRODUODENOSCOPY (EGD); Surgeon: [place] MD;  Location: [facility];   Service:    • GALLBLADDER SURGERY     • LAPAROTOMY N/A 10/25/2016    Procedure: LAPAROTOMY EXPLORATORY;  Surgeon: Marcia Delgado MD;  Location: [facility];  Service:    • MOUTH SURGERY     • PERCUTANEOUS PINNING FEMORAL NECK FRACTURE     • SHOULDER SURGERY Right    • SHOULDER SURGERY     • SMALL INTESTINE SURGERY     • STOMACH SURGERY      bal surgery     Social History   Social History     Substance and Sexual Activity   Alcohol Use Yes   • Alcohol/week: 42 0 standard drinks   • Types: 42 Cans of beer per week    Comment: a six pack a day     Social History     Substance and Sexual Activity   Drug Use No     Social History     Tobacco Use   Smoking Status Every Day   • Packs/day: 3 00   • Years: 35 00   • Pack years: 105 00   • Types: Cigarettes   Smokeless Tobacco Never       Family History:   Family History Problem Relation Age of Onset   • Breast cancer Mother    • Prostate cancer Father    • Skin cancer Brother        Medications:  Pertinent medications were reviewed  Current Facility-Administered Medications   Medication Dose Route Frequency Provider Last Rate   • cholecalciferol  1,000 Units Oral Daily Brayden Chambers MD     • cyanocobalamin  100 mcg Oral Daily Brayden Chambers MD     • diphenhydrAMINE  25 mg Oral Q8H PRN Brayden Chambers MD     • docusate sodium  100 mg Oral BID PRN Brayden Chambers MD     • folic acid  1 mg Oral Daily Brayden Chambers MD     • heparin (porcine)  5,000 Units Subcutaneous [facility] Brayden Chambers MD     • levETIRAcetam  1,000 mg Oral Q12H [address] Brayden Chambers MD     • LORazepam  0 5 mg Oral HS Brayden Chambers MD     • multivitamin-minerals  1 tablet Oral Daily Brayden Chambers MD     • nicotine  1 patch Transdermal Daily Brayden Chambers MD     • ondansetron  4 mg Intravenous Q6H PRN Brayden Chambers MD     • sodium chloride  1 g Oral TID With Meals Brayden Chambers MD     • thiamine  100 mg Oral Daily Brayden Chambers MD           No Known Allergies      Vitals:   /75   Pulse 94   Temp 99 2 °F (37 3 °C)   Resp 16   Ht 5' 4" (1 626 m)   Wt 51 6 kg (113 lb 12 1 oz)   SpO2 96%   BMI 19 53 kg/m²   Body mass index is 19 53 kg/m²    SpO2: 96 %,   SpO2 Activity: At Rest,   O2 Device: None (Room air)      Intake/Output Summary (Last 24 hours) at 2/3/2023 1116  Last data filed at 2/3/2023 0547  Gross per 24 hour   Intake 344 ml   Output 200 ml   Net 144 ml     Invasive Devices     Peripheral Intravenous Line  Duration           Peripheral IV 02/01/23 Right;Ventral (anterior) Forearm 2 days                Physical Exam  General: conscious, cooperative, in no acute distress  Eyes: conjunctivae pink, anicteric sclerae  ENT: lips and mucous membranes moist  Neck: supple, no JVD, no masses  Chest: clear breath sounds bilaterally, no crackles, ronchus or wheezings  CVS: S1 & S2, normal rate, regular rhythm  Abdomen: soft, non-tender, non-distended, normoactive bowel sounds  Extremities: no edema of both legs  Skin: no rash  Neuro: awake, alert, oriented appears older than stated age      Diagnostic Data:  Lab: I have personally reviewed pertinent lab results  ,   CBC:  Results from last 7 days   Lab Units 02/03/23  0603   WBC Thousand/uL 4 89   HEMOGLOBIN g/dL 11 3*   HEMATOCRIT % 32 4*   PLATELETS Thousands/uL 150      CMP:   Lab Results   Component Value Date    SODIUM 126 (L) 02/03/2023    K 4 3 02/03/2023    CL 97 02/03/2023    CO2 23 02/03/2023    BUN 14 02/03/2023    CREATININE 0 66 02/03/2023    CALCIUM 8 2 (L) 02/03/2023    EGFR 108 02/03/2023   ,   PT/INR: No results found for: PT, INR,   Magnesium: No components found for: MAG,  Phosphorous: No results found for: PHOS    Microbiology:  @LABRCNTIP,(urinecx:7)@        LORA Gutiérrez    Portions of the record may have been created with voice recognition software  Occasional wrong word or "sound a like" substitutions may have occurred due to the inherent limitations of voice recognition software  Read the chart carefully and recognize, using context, where substitutions have occurred

## 2023-02-03 NOTE — INCIDENTAL FINDINGS
The following findings require follow up:  Radiographic finding   Finding:    Increased size of the partially visualized 6 9 cm pancreatic head pseudocyst since 5/23/2022 and new mild dilatation of the main pancreatic duct, likely due to mass effect from the pancreatic head pseudocyst   Recommend correlation with lipase  Mildly patulous esophagus with air-fluid levels  Follow up required: Follow-up with GI   Follow up should be done within 2-3 week(s)    Please notify the following clinician to assist with the follow up: Outpatient GI office

## 2023-02-03 NOTE — PROGRESS NOTES
NEPHROLOGY PROGRESS NOTE   Mary Mccurdy 65 y o  male MRN: [id number]  Unit/Bed#: 406-01 Encounter: [id number]    Assessment/Plan:    66 yo man with history of alcoholic cirrhosis, alcohol abuse in remission, seizure disorder, hypertension being treated for hyponatremia noted on outpatient lab work  1  Moderate chronic asymptomatic hyponatremia  · Saurav sodium level 119 mmol/L  He is potomanic and has elevated urine osmolality likely 2/2 cirrhosis  Documented hypcortisolism  f/w endo s/p treatment  · Initiated on sodium tablets Wednesday 2/1  · May be discharged today which is okay from a nephrology perspective with the following recommendations:  · Strict 1 5 liter per day fluid restriction  · Restart bumex 1 mg daily  · Continue sodium chloride 1 g TID cautiously  He will likely have about 7 gram sodium intake per day based upon his dietary habits  Recommend close outpatient follow-up with nephrology to wean as able  Prefer to treat patient with loop diuretic and fluid restriction  High sodium diet places at risk for volume overload particularly in cirrhotic with history of ascites  2  History of hypocortisolism  · Evaluated by Dr Gardenia Dupree in October of 2022  He was weaned off steroids and found to have normal cortisol and ACTH levels  3  History of hypertension  · Thiazides absolutely contraindicated      ROS  States he did not tolerate high dose of salt tabs  A complete 10 point review of systems have been performed and are otherwise negative         Historical Information   Past Medical History:   Diagnosis Date   • Alcohol abuse    • Traore esophagus    • Bowel obstruction (HCC)    • Bowel perforation (HCC)    • Cardiac disease    • Continuous chronic alcoholism ([facility] [address] ) 10/5/2017   • COPD (chronic obstructive pulmonary disease) (HCC)    • History of shoulder surgery     Right shoulder   • History of transfusion    • Hx of cervical spine surgery    • Hypertension    • Incisional hernia 10/8/2017   • MI, old • Mitral regurgitation    • Psychiatric disorder    • Seizures [facility])      Past Surgical History:   Procedure Laterality Date   • APPENDECTOMY     • BACK SURGERY     • CHOLECYSTECTOMY     • ESOPHAGOGASTRODUODENOSCOPY N/A 11/28/2016    Procedure: ESOPHAGOGASTRODUODENOSCOPY (EGD); Surgeon: Lieutenant Kina MD;  Location:  GI LAB;   Service:    • GALLBLADDER SURGERY     • LAPAROTOMY N/A 10/25/2016    Procedure: LAPAROTOMY EXPLORATORY;  Surgeon: Ela Diaz MD;  Location: [facility];  Service:    • MOUTH SURGERY     • PERCUTANEOUS PINNING FEMORAL NECK FRACTURE     • SHOULDER SURGERY Right    • SHOULDER SURGERY     • SMALL INTESTINE SURGERY     • STOMACH SURGERY      bal surgery     Social History   Social History     Substance and Sexual Activity   Alcohol Use Yes   • Alcohol/week: 42 0 standard drinks   • Types: 42 Cans of beer per week    Comment: a six pack a day     Social History     Substance and Sexual Activity   Drug Use No     Social History     Tobacco Use   Smoking Status Every Day   • Packs/day: 3 00   • Years: 35 00   • Pack years: 105 00   • Types: Cigarettes   Smokeless Tobacco Never       Family History:   Family History   Problem Relation Age of Onset   • Breast cancer Mother    • Prostate cancer Father    • Skin cancer Brother        Medications:  Pertinent medications were reviewed  Current Facility-Administered Medications   Medication Dose Route Frequency Provider Last Rate   • cholecalciferol  1,000 Units Oral Daily Trisha Bundy MD     • cyanocobalamin  100 mcg Oral Daily Trisha Bundy MD     • diphenhydrAMINE  25 mg Oral Q8H PRN Trisha Bundy MD     • docusate sodium  100 mg Oral BID PRN Trisha Bundy MD     • folic acid  1 mg Oral Daily Trisha Bundy MD     • heparin (porcine)  5,000 Units Subcutaneous [facility] Trisha Bundy MD     • levETIRAcetam  1,000 mg Oral Q12H [address], [place]     • LORazepam  0 5 mg Oral HS Heike Galan Herbert Slater MD     • multivitamin-minerals  1 tablet Oral Daily Beena Otero MD     • nicotine  1 patch Transdermal Daily Beena Otero MD     • ondansetron  4 mg Intravenous Q6H PRN Beena Otero MD     • sodium chloride  1 g Oral TID With Meals Beena Otero MD     • thiamine  100 mg Oral Daily Beena Otero MD           No Known Allergies      Vitals:   /75   Pulse 94   Temp 99 2 °F (37 3 °C)   Resp 16   Ht 5' 4" (1 626 m)   Wt 51 6 kg (113 lb 12 1 oz)   SpO2 96%   BMI 19 53 kg/m²   Body mass index is 19 53 kg/m²  SpO2: 96 %,   SpO2 Activity: At Rest,   O2 Device: None (Room air)      Intake/Output Summary (Last 24 hours) at 2/3/2023 1134  Last data filed at 2/3/2023 0547  Gross per 24 hour   Intake 344 ml   Output 200 ml   Net 144 ml     Invasive Devices     Peripheral Intravenous Line  Duration           Peripheral IV 02/01/23 Right;Ventral (anterior) Forearm 2 days                Physical Exam  General: conscious, cooperative, in no acute distress appears older than stated age  Eyes: conjunctivae pink, anicteric sclerae  ENT: lips and mucous membranes moist  Neck: supple, no JVD, no masses  Chest: clear breath sounds bilaterally, no crackles, ronchus or wheezings  CVS: S1 & S2, normal rate, regular rhythm  Abdomen: soft, non-tender, non-distended, normoactive bowel sounds  Extremities: no edema of both legs  Skin: no rash  Neuro: awake, alert, oriented      Diagnostic Data:  Lab: I have personally reviewed pertinent lab results  ,   CBC:  Results from last 7 days   Lab Units 02/03/23  0603   WBC Thousand/uL 4 89   HEMOGLOBIN g/dL 11 3*   HEMATOCRIT % 32 4*   PLATELETS Thousands/uL 150      CMP:   Lab Results   Component Value Date    SODIUM 126 (L) 02/03/2023    K 4 3 02/03/2023    CL 97 02/03/2023    CO2 23 02/03/2023    BUN 14 02/03/2023    CREATININE 0 66 02/03/2023    CALCIUM 8 2 (L) 02/03/2023    EGFR 108 02/03/2023   ,   PT/INR: No results found for: PT, INR, Magnesium: No components found for: MAG,  Phosphorous: No results found for: PHOS    Microbiology:  @LABNT,(urinecx:7)@        LORA Melgoza    Portions of the record may have been created with voice recognition software  Occasional wrong word or "sound a like" substitutions may have occurred due to the inherent limitations of voice recognition software  Read the chart carefully and recognize, using context, where substitutions have occurred

## 2023-02-03 NOTE — CASE MANAGEMENT
Case Management Discharge Planning Note    Patient name Blair [facility]  Location [facility] [id number] MRN [id number]  : 1965 Date 2/3/2023       Current Admission Date: 2023  Current Admission Diagnosis:Hyponatremia   Patient Active Problem List    Diagnosis Date Noted   • Moderate protein-calorie malnutrition ([address] ) 2023   • Esophageal abnormality 2023   • Abnormal CXR 2023   • Osteoporosis with current pathological fracture 2022   • Chronic anemia 2022   • Clavicular fracture 2022   • Low serum cortisol level 2022   • Hypocalcemia 2022   • Elevated d-dimer 2022   • COVID-19 virus infection 2022   • Alcohol use 2022   • Thoracic compression fracture ([address] ) 2022   • Aortic ectasia ([address] ) 2022   • Rib fractures 2022   • Seizure disorder ([address] ) 2022   • Hypoxia 2022   • Traore's esophagus 2022   • Ambulatory dysfunction 2021   • Current every day smoker 2021   • Fall 2021   • Hx of duodenal ulcer 2021   • Neck pain 2021   • Acute on chronic pancreatitis ([address] ) 2020   • Pancreatic pseudocyst 2020   • COPD (chronic obstructive pulmonary disease) ([address] ) 2020   • Ileus ([address] ) 2020   • Abnormal EKG 2020   • Lesion of spleen 2020   • Left anterior fascicular block 2020   • Constipation 2020   • Transaminitis 2020   • Celiac artery stenosis ([address] ) 2020   • Pancreatic mass 2020   • Pancytopenia ([address] ) 2020   • Traore esophagus    • Duodenal ulcer 2019   • Tubular adenoma 2019   • Iron deficiency anemia due to chronic blood loss 10/22/2019   • Closed fracture of left olecranon process, initial encounter 2019   • Thrombocytopenia ([place] [address] )    • Alcoholic cirrhosis of liver without ascites ([place] [address] ) 2018   • Seizure-like activity ([place] [address] ) 2018   • Diarrhea 2018   • Abnormality of pancreatic duct 11/02/2017   • Hx of seizure disorder 10/12/2017   • Incisional hernia 10/08/2017   • Continuous chronic alcoholism ([facility] [address] ) 10/05/2017   • T6 vertebral fracture ([facility] [address] ) 09/21/2017   • Neuropathy involving both lower extremities 08/15/2017   • Hypokalemia 07/29/2017   • Malnutrition of moderate degree (HCC) 05/18/2017   • Generalized weakness 05/17/2017   • Bladder wall thickening 03/09/2017   • Hypophosphatemia 03/09/2017   • Urinary retention 66/01/6626   • Alcoholic hepatitis without ascites 03/07/2017   • Vitamin D deficiency 03/07/2017   • Iron deficiency 03/07/2017   • Depression 02/08/2017   • Elevated alkaline phosphatase level 01/14/2017   • Hepatomegaly 01/12/2017   • Hepatic steatosis 01/12/2017   • Hypomagnesemia 01/12/2017   • Hyponatremia 01/11/2017   • Dietary folate deficiency anemia 01/11/2017   • Ventral hernia without obstruction or gangrene 01/11/2017   • Abdominal wound dehiscence 12/15/2016   • Microcytic anemia 06/20/2016   • Allergic rhinitis 06/07/2016   • Tobacco abuse 05/22/2016   • Essential hypertension 05/22/2016   • H/O suicide attempt 05/22/2016   • H/O cervical spine surgery 05/22/2016   • Left foot drop 10/31/2014   • Peripheral neuropathy 10/31/2014   • Cervical disc disorder with myelopathy 09/25/2014   • Lumbar radiculopathy 09/25/2014   • Cervical spinal stenosis 07/30/2014      LOS (days): 2  Geometric Mean LOS (GMLOS) (days): 2 60  Days to GMLOS:0 4     OBJECTIVE:  Risk of Unplanned Readmission Score: 15 53         Current admission status: Inpatient   Preferred Pharmacy:   [facility] [address] [address]  Phone: [phone] Fax: [phone]    Primary Care Provider: Azul Peterson DO    [facility]  Secondary Insurance:     DISCHARGE DETAILS:Pt is being dc'd home on this date with OP follow up       Discharge planning discussed with[de-identified] patient        CM contacted family/caregiver?: No- see comments (declines)  Were Treatment Team discharge recommendations reviewed with patient/caregiver?: Yes  Did patient/caregiver verbalize understanding of patient care needs?: Yes  Were patient/caregiver advised of the risks associated with not following Treatment Team discharge recommendations?: Yes         [facility]         Is the patient interested in [place] [address] at discharge?: No    DME Referral Provided  Referral made for DME?: No         Would you like to participate in our [facility] service program?  : No - Declined       Discharge Destination Plan[de-identified] Home  Transport at Discharge : Family

## 2023-02-06 ENCOUNTER — APPOINTMENT (OUTPATIENT)
Dept: PHYSICAL THERAPY | Facility: CLINIC | Age: 58
End: 2023-02-06

## 2023-02-06 ENCOUNTER — OFFICE VISIT (OUTPATIENT)
Dept: PHYSICAL THERAPY | Facility: CLINIC | Age: 58
End: 2023-02-06

## 2023-02-06 ENCOUNTER — TELEMEDICINE (OUTPATIENT)
Dept: FAMILY MEDICINE CLINIC | Facility: CLINIC | Age: 58
End: 2023-02-06

## 2023-02-06 ENCOUNTER — APPOINTMENT (OUTPATIENT)
Dept: LAB | Facility: MEDICAL CENTER | Age: 58
End: 2023-02-06

## 2023-02-06 DIAGNOSIS — M62.511 ATROPHY OF MUSCLE OF RIGHT SHOULDER: Primary | ICD-10-CM

## 2023-02-06 DIAGNOSIS — G40.909 SEIZURE DISORDER (HCC): ICD-10-CM

## 2023-02-06 DIAGNOSIS — K86.3 PANCREATIC PSEUDOCYST: ICD-10-CM

## 2023-02-06 DIAGNOSIS — Z76.89 ENCOUNTER FOR SUPPORT AND COORDINATION OF TRANSITION OF CARE: Primary | ICD-10-CM

## 2023-02-06 DIAGNOSIS — E87.1 HYPONATREMIA: ICD-10-CM

## 2023-02-06 DIAGNOSIS — G47.00 INSOMNIA, UNSPECIFIED TYPE: ICD-10-CM

## 2023-02-06 DIAGNOSIS — R26.2 AMBULATORY DYSFUNCTION: ICD-10-CM

## 2023-02-06 LAB
ANION GAP SERPL CALCULATED.3IONS-SCNC: 6 MMOL/L (ref 4–13)
BUN SERPL-MCNC: 11 MG/DL (ref 5–25)
CALCIUM SERPL-MCNC: 9.1 MG/DL (ref 8.3–10.1)
CHLORIDE SERPL-SCNC: 95 MMOL/L (ref 96–108)
CO2 SERPL-SCNC: 25 MMOL/L (ref 21–32)
CREAT SERPL-MCNC: 0.75 MG/DL (ref 0.6–1.3)
GFR SERPL CREATININE-BSD FRML MDRD: 102 ML/MIN/1.73SQ M
GLUCOSE P FAST SERPL-MCNC: 85 MG/DL (ref 65–99)
POTASSIUM SERPL-SCNC: 4.5 MMOL/L (ref 3.5–5.3)
SODIUM SERPL-SCNC: 126 MMOL/L (ref 135–147)

## 2023-02-06 RX ORDER — LEVETIRACETAM 1000 MG/1
1000 TABLET ORAL EVERY 12 HOURS
Qty: 60 TABLET | Refills: 2 | Status: SHIPPED | OUTPATIENT
Start: 2023-02-06

## 2023-02-06 RX ORDER — LORAZEPAM 0.5 MG/1
0.5 TABLET ORAL
Qty: 30 TABLET | Refills: 0 | Status: SHIPPED | OUTPATIENT
Start: 2023-02-06

## 2023-02-06 NOTE — PROGRESS NOTES
TCM Call     Date and time call was made  2/3/2023  1:34 PM    Hospital care reviewed  Records reviewed    Patient was hospitialized at  [address]    Date of Admission  02/01/23    Date of discharge  02/03/23    Diagnosis  hyponatremia    Disposition  Home    Were the patients medications reviewed and updated  No    Current Symptoms  None      TCM Call     Post hospital issues  None    Should patient be enrolled in anticoag monitoring? No    Scheduled for follow up? Yes    I have advised the patient to call PCP with any new or worsening symptoms  dj [facility]    Living Arrangements  Family members            Assessment/Plan:     Diagnoses and all orders for this visit:    Encounter for support and coordination of transition of care    Hyponatremia  Comments:  Na level 126 on BMP today  Continue fluid restriction  Follow up with nephrology as scheduled    Insomnia, unspecified type  Comments:  Trouble falling and staying asleep  Tried multiple medications for sleep in the past   Limited med options due to hyponatremia    Orders:  -     LORazepam (ATIVAN) 0 5 mg tablet; Take 1 tablet (0 5 mg total) by mouth daily at bedtime    Seizures (HCC)  Comments:  Keppra reordered  Labs ordered to evaluate current levels  Orders:  -     levETIRAcetam (KEPPRA) 1000 MG tablet; Take 1 tablet (1,000 mg total) by mouth every 12 (twelve) hours  -     Levetiracetam level; Future    Pancreatic pseudocyst  Comments:  GI referral entered upon hospital DC  Patient to follow up with them for further mgmt        Controlled Substance Review    [place] PDMP reviewed: No red flags were identified; safe to proceed with prescription  Return in about 4 weeks (around 3/6/2023) for Recheck hyponatremia, GI follow up, and HTN  Subjective:        Patient ID: Virginia Doss is a 65 y o  male  No chief complaint on file        [facility] alcoholic hepatitis without ascites, fonseca's esophagus, hyponatremia, pancreatitis, COPD, HTN, seizures, allergic rhinitis, aortic ectasia, and depression present for virtual TCM  Was hospitalized with hyponatremia from 2/1/23 to 2/3/23  Sodium level on labs is stable at 126 today  Patient denies any confusion or difficulty urinating  Has continued following fluid restriction  Continues to go to [facility]  and has been working on scheduling an appointment with GI for follow up  Endorses difficulty sleeping without lorazepam  Has been using melatonin to substitute but reports sleep quality is not as good        The following portions of the patient's history were reviewed and updated as appropriate: allergies, current medications, past family history, past medical history, past social history, past surgical history and problem list     Patient Active Problem List   Diagnosis   • Tobacco abuse   • Essential hypertension   • H/O suicide attempt   • H/O cervical spine surgery   • Hyponatremia   • Dietary folate deficiency anemia   • Ventral hernia without obstruction or gangrene   • Hepatomegaly   • Hepatic steatosis   • Hypomagnesemia   • Elevated alkaline phosphatase level   • Alcoholic hepatitis without ascites   • Vitamin D deficiency   • Iron deficiency   • Urinary retention   • Bladder wall thickening   • Hypophosphatemia   • Generalized weakness   • Malnutrition of moderate degree (HCC)   • Hypokalemia   • Continuous chronic alcoholism (HCC)   • Incisional hernia   • Hx of seizure disorder   • Seizure-like activity (HCC)   • Diarrhea   • Abnormality of pancreatic duct   • Allergic rhinitis   • Microcytic anemia   • Abdominal wound dehiscence   • Cervical disc disorder with myelopathy   • Cervical spinal stenosis   • Depression   • Left foot drop   • Lumbar radiculopathy   • Neuropathy involving both lower extremities   • Peripheral neuropathy   • T6 vertebral fracture (HCC)   • Alcoholic cirrhosis of liver without ascites (HCC)   • Thrombocytopenia (HCC)   • Closed fracture of left olecranon process, initial encounter   • Iron deficiency anemia due to chronic blood loss   • Duodenal ulcer   • Tubular adenoma   • Traore esophagus   • Celiac artery stenosis (HCC)   • Pancreatic mass   • Pancytopenia (HCC)   • Constipation   • Transaminitis   • Lesion of spleen   • Left anterior fascicular block   • Abnormal EKG   • Acute on chronic pancreatitis [facility])   • Pancreatic pseudocyst   • COPD (chronic obstructive pulmonary disease) (HCC)   • Ileus (HCC)   • Ambulatory dysfunction   • Current every day smoker   • Fall   • Hx of duodenal ulcer   • Neck pain   • Alcohol use   • Thoracic compression fracture (HCC)   • Aortic ectasia (HCC)   • Rib fractures   • Seizure disorder (HCC)   • Hypoxia   • Traore's esophagus   • Elevated d-dimer   • COVID-19 virus infection   • Hypocalcemia   • Clavicular fracture   • Low serum cortisol level   • Chronic anemia   • Osteoporosis with current pathological fracture   • Abnormal CXR   • Moderate protein-calorie malnutrition (HCC)   • Esophageal abnormality   • Insomnia       Current Outpatient Medications   Medication Sig Dispense Refill   • levETIRAcetam (KEPPRA) 1000 MG tablet Take 1 tablet (1,000 mg total) by mouth every 12 (twelve) hours 60 tablet 2   • LORazepam (ATIVAN) 0 5 mg tablet Take 1 tablet (0 5 mg total) by mouth daily at bedtime 30 tablet 0   • bumetanide (BUMEX) 1 mg tablet Take 1 tablet (1 mg total) by mouth daily 30 tablet 2   • Cholecalciferol 25 MCG (1000 UT) tablet Take 1 tablet (1,000 Units total) by mouth daily 30 tablet 0   • cyanocobalamin (VITAMIN B-12) 100 mcg tablet Take 1 tablet (100 mcg total) by mouth daily 30 tablet 5   • Diclofenac Sodium (VOLTAREN) 1 % Apply 2 g topically 4 (four) times a day 100 g 1   • docusate sodium (COLACE) 100 mg capsule Take 1 capsule (100 mg total) by mouth 2 (two) times a day as needed for constipation 30 capsule 0   • ergocalciferol (VITAMIN D2) 50,000 units Take 1 capsule (50,000 Units total) by mouth once a week 13 capsule 1   • folic acid (FOLVITE) 1 mg tablet Take 1 tablet (1 mg total) by mouth daily 30 tablet 0   • lisinopril (ZESTRIL) 2 5 mg tablet Take 1 tablet (2 5 mg total) by mouth daily 90 tablet 2   • Multiple Vitamin (TAB-A-BONI PO) Take 1 tablet by mouth daily     • multivitamin (THERAGRAN) TABS Take 1 tablet by mouth daily     • sodium chloride 1 g tablet Take 1 tablet (1 g total) by mouth 3 (three) times a day with meals 90 tablet 0   • thiamine 100 MG tablet Take 1 tablet (100 mg total) by mouth daily 30 tablet 0     No current facility-administered medications for this visit  Past Medical History:   Diagnosis Date   • Alcohol abuse    • Traore esophagus    • Bowel obstruction (HCC)    • Bowel perforation (HCC)    • Cardiac disease    • Continuous chronic alcoholism ([facility] [address] ) 10/5/2017   • COPD (chronic obstructive pulmonary disease) (HCC)    • History of shoulder surgery     Right shoulder   • History of transfusion    • Hx of cervical spine surgery    • Hypertension    • Incisional hernia 10/8/2017   • MI, old    • Mitral regurgitation    • Psychiatric disorder    • [facility] )         Past Surgical History:   Procedure Laterality Date   • APPENDECTOMY     • BACK SURGERY     • CHOLECYSTECTOMY     • ESOPHAGOGASTRODUODENOSCOPY N/A 11/28/2016    Procedure: ESOPHAGOGASTRODUODENOSCOPY (EGD); Surgeon: Viktoria Messer MD;  Location: [facility];   Service:    • GALLBLADDER SURGERY     • LAPAROTOMY N/A 10/25/2016    Procedure: LAPAROTOMY EXPLORATORY;  Surgeon: Drew Esquivel MD;  Location: [facility];  Service:    • MOUTH SURGERY     • PERCUTANEOUS PINNING FEMORAL NECK FRACTURE     • SHOULDER SURGERY Right    • SHOULDER SURGERY     • SMALL INTESTINE SURGERY     • STOMACH SURGERY      bal surgery        Social History     Socioeconomic History   • Marital status: Single     Spouse name: Not on file   • Number of children: Not on file   • Years of education: Not on file   • Highest education level: Not on file   Occupational History • Not on file   Tobacco Use   • Smoking status: Every Day     Packs/day: 3 00     Years: 35 00     Pack years: 105 00     Types: Cigarettes   • Smokeless tobacco: Never   Vaping Use   • Vaping Use: Former   Substance and Sexual Activity   • Alcohol use: Yes     Alcohol/week: 42 0 standard drinks     Types: 42 Cans of beer per week     Comment: a six pack a day   • Drug use: No   • Sexual activity: Not Currently     Partners: Female   Other Topics Concern   • Not on file   Social History Narrative   • Not on file     Social Determinants of Health     Financial Resource Strain: Not on file   Food Insecurity: No Food Insecurity   • Worried About Running Out of Food in the Last Year: Never true   • Ran Out of Food in the Last Year: Never true   Transportation Needs: No Transportation Needs   • Lack of Transportation (Medical): No   • Lack of Transportation (Non-Medical): No   Physical Activity: Not on file   Stress: Not on file   Social Connections: Not on file   Intimate Partner Violence: Not on file   Housing Stability: Low Risk    • Unable to Pay for Housing in the Last Year: No   • Number of Places Lived in the Last Year: 1   • Unstable Housing in the Last Year: No         Review of Systems   Constitutional: Negative for activity change, appetite change, fatigue and unexpected weight change  HENT: Negative for congestion, ear pain, hearing loss, nosebleeds, rhinorrhea, sinus pain and trouble swallowing  Eyes: Negative for photophobia  Respiratory: Negative for choking, shortness of breath and wheezing  Cardiovascular: Negative for chest pain, palpitations and leg swelling  Gastrointestinal: Negative for abdominal pain, blood in stool, constipation, diarrhea and nausea  Endocrine: Negative for polydipsia, polyphagia and polyuria  Genitourinary: Negative for difficulty urinating, dysuria, frequency, hematuria and urgency  Musculoskeletal: Positive for arthralgias, back pain and gait problem  Negative for myalgias  Skin: Negative for color change and rash  Neurological: Negative for dizziness, weakness and headaches  Psychiatric/Behavioral: Positive for sleep disturbance  Negative for agitation, confusion, self-injury and suicidal ideas  Objective: There were no vitals taken for this visit  Physical Exam  Pulmonary:      Comments: No conversational dyspnea  Neurological:      Mental Status: He is oriented to person, place, and time  Telemedicine consent    Patient: Shital Munroe  Provider: LORA Larkin  Provider located at [address]    The patient was identified by name and date of birth  Shital Lab was informed that this is a telemedicine visit and that the visit is being conducted through Telephone  My office door was closed  No one else was in the room  He acknowledged consent and understanding of privacy and security of the video platform  The patient has agreed to participate and understands they can discontinue the visit at any time  Patient is aware this is a billable service  I spent 26 minutes with the patient during this visit

## 2023-02-06 NOTE — PROGRESS NOTES
Daily Note     Today's date: 2023  Patient name: Mac Bonilla  : 1965  MRN: [id number]  Referring provider: Bon Voss PA-C  Dx:   Encounter Diagnosis     ICD-10-CM    1  Atrophy of muscle of right shoulder  M62 511       2  Ambulatory dysfunction  R26 2                      Subjective: Patient admitted to hospital post labs due to low sodium levels  He was there for 2 days  He reports he is feeling "fine " Sodium levels are being checked today  R shoulder has crepitus, when this happens he gets pain  Objective: See treatment diary below  Modified program due to patient fatigue today  Will resume when able  Assessment: Tolerated treatment well  Patient demonstrated fatigue post treatment and would benefit from continued PT to improve R shoulder ROM and strength  Patient seemed very weak today  Modified program due to this  Will resume normal program Thursday if able  Plan: Continue per plan of care        Precautions: FALL RISK      Date    Manuals        PROM all planes CM KS CM CM CM   Assessment  KS                      Neuro Re-Ed        Ball Stability        Bilateral ER  nv      Standing TB Row/Ext L3 2x10 ea L3 2x10 L3 2x10 L3 2x10 L3 2x10 ea   SA Punches Stick 2# 2x10 Stick 2# 2x10 Stick 2# 2x10 Active 2x10 Stick 2x10   Bent over on wedge Row/Ext  nv 1# 3x10 ea np NV    Bent over on wedge Hz abd   1# 15x np NV    Ther Ex        Stick: FLX & ER 5"x20 ea 5"x20ea 5"x20 ea 5"x20 ea    Posterior Capsule Stretch        S/Lying ER/ABD 1# 2x10 ea  1# 2x10 ea np NV 1# 2x10 ea   Standing TB ER L1/L2 2x10 L2 2x10 L3 3x10 np NV L2 2x10   Standing TB IR L3 2x10 L3 2x10 L3 3x10 np NV L3 2x10   Pulleys 4' Scap 2' scap 4' scap  4' scap 3' scap   Bicep Curls w/ LAQ  L3 3x10 5# 3x10 5# 3x10  SL P2 LAQ    Tricep Ext  L3 3x10 L3 3x10 L3 2x10    Nustep    L5 10'  UE ROM L5 10'  UE ROM    Ther Activity        Over head lifts        Wall ball        Push ups Modalities        MHP   During PROM

## 2023-02-07 ENCOUNTER — TELEPHONE (OUTPATIENT)
Dept: GASTROENTEROLOGY | Facility: CLINIC | Age: 58
End: 2023-02-07

## 2023-02-07 NOTE — TELEPHONE ENCOUNTER
Patients GI provider:  Dr Kendra Nguyen    Number to return call: [phone]    Reason for call: Pt calling to schedule hospital f/u  Pt stated he needed to be seen on office in 2 weeks for esophoageal abnormality       Scheduled procedure/appointment date if applicable: Appt: 9/17/8907

## 2023-02-09 ENCOUNTER — APPOINTMENT (OUTPATIENT)
Dept: PHYSICAL THERAPY | Facility: CLINIC | Age: 58
End: 2023-02-09

## 2023-02-09 ENCOUNTER — OFFICE VISIT (OUTPATIENT)
Dept: PHYSICAL THERAPY | Facility: CLINIC | Age: 58
End: 2023-02-09

## 2023-02-09 DIAGNOSIS — M62.511 ATROPHY OF MUSCLE OF RIGHT SHOULDER: Primary | ICD-10-CM

## 2023-02-09 NOTE — PROGRESS NOTES
Daily Note     Today's date: 2023  Patient name: Carolin Corona  : 1965  MRN: [id number]  Referring provider: Tyrone Zaman PA-C  Dx:   Encounter Diagnosis     ICD-10-CM    1  Atrophy of muscle of right shoulder  M62 511           Start Time: 0800  Stop Time: 0900  Total time in clinic (min): 60 minutes    Subjective: Reports right shoulder "feeling okay " Had a fall prior to his fasted blood work earlier this week secondary to feeling faint from lack of food and meds and his left knee giving out  Reports no head injury or LOC, has a small skin abrasion behind right ear  Objective: See treatment diary below      Assessment: Tolerated treatment well  Patient demonstrated fatigue post treatment, exhibited good technique with therapeutic exercises and would benefit from continued PT  Demonstrates improved right shoulder mobility c/ repeated PROM stretching of right shoulder  Tenderness initially c/ internal rotation, but improved c/ time and reps both in pain and ROM  Increased repetitions c/ HABD bent over modified blackburn exercise this visit, indicating continued progress in scapular strength  Plan: Continue per plan of care        Precautions: FALL RISK      Date    Manuals        PROM all planes CM KS CM CM KS   Assessment  KS                      Neuro Re-Ed        Ball Stability        Bilateral ER  nv      Standing TB Row/Ext L3 2x10 ea L3 2x10 L3 2x10 L3 2x10 L3 3x10   SA Punches Stick 2# 2x10 Stick 2# 2x10 Stick 2# 2x10 Active 2x10 2# Stick 3x10   Bent over on wedge Row/Ext  nv 1# 3x10 ea np NV 1# 3x10ea   Bent over on wedge Hz abd   1# 15x np NV 1# 15x   Ther Ex        Stick: FLX & ER 5"x20 ea 5"x20ea 5"x20 ea 5"x20 ea 5"x20ea   Posterior Capsule Stretch        S/Lying ER/ABD 1# 2x10 ea  1# 2x10 ea np NV 1# 3x10ea   Standing TB ER L1/L2 2x10 L2 2x10 L3 3x10 np NV L3 3x10   Standing TB IR L3 2x10 L3 2x10 L3 3x10 np NV L3 3x10   Pulleys 4' Scap 2' scap 4' scap 4' scap 4' Scap   Bicep Curls w/ LAQ  L3 3x10 5# 3x10 5# 3x10  SL P2 LAQ 5# 3x10  SL P2 LAQ   Tricep Ext  L3 3x10 L3 3x10 L3 2x10 L3 3x10   Nustep    L5 10'  UE ROM L5 10'  UE ROM L6 10' UE Endurance   Ther Activity        Over head lifts        Wall ball        Push ups        Modalities        MHP   During PROM  During Supine Exercises

## 2023-02-13 ENCOUNTER — OFFICE VISIT (OUTPATIENT)
Dept: NEPHROLOGY | Facility: CLINIC | Age: 58
End: 2023-02-13

## 2023-02-13 ENCOUNTER — OFFICE VISIT (OUTPATIENT)
Dept: PHYSICAL THERAPY | Facility: CLINIC | Age: 58
End: 2023-02-13

## 2023-02-13 ENCOUNTER — APPOINTMENT (OUTPATIENT)
Dept: PHYSICAL THERAPY | Facility: CLINIC | Age: 58
End: 2023-02-13

## 2023-02-13 VITALS
SYSTOLIC BLOOD PRESSURE: 90 MMHG | DIASTOLIC BLOOD PRESSURE: 58 MMHG | WEIGHT: 113 LBS | HEIGHT: 64 IN | BODY MASS INDEX: 19.29 KG/M2

## 2023-02-13 DIAGNOSIS — Z86.69 HX OF SEIZURE DISORDER: ICD-10-CM

## 2023-02-13 DIAGNOSIS — K70.30 ALCOHOLIC CIRRHOSIS OF LIVER WITHOUT ASCITES (HCC): ICD-10-CM

## 2023-02-13 DIAGNOSIS — M62.511 ATROPHY OF MUSCLE OF RIGHT SHOULDER: Primary | ICD-10-CM

## 2023-02-13 DIAGNOSIS — Z72.0 TOBACCO ABUSE: ICD-10-CM

## 2023-02-13 DIAGNOSIS — I10 ESSENTIAL HYPERTENSION: ICD-10-CM

## 2023-02-13 DIAGNOSIS — E87.1 CHRONIC HYPONATREMIA: Primary | ICD-10-CM

## 2023-02-13 NOTE — PROGRESS NOTES
Daily Note     Today's date: 2023  Patient name: Ami Crespo  : 1965  MRN: [id number]  Referring provider: Canelo Marquez PA-C  Dx:   Encounter Diagnosis     ICD-10-CM    1  Atrophy of muscle of right shoulder  M62 511           Start Time: 0800  Stop Time: 0850  Total time in clinic (min): 50 minutes    Subjective: Reports approximately 75% improvement in right shoulder pain since beginning PT, finds the strengthening with weights it really helping  Objective: See treatment diary below      Assessment: Tolerated treatment well  Patient demonstrated fatigue post treatment, exhibited good technique with therapeutic exercises and would benefit from continued PT  Limited internal rotation responds very well c/ improved ROM to gentle manual stretching  Ended session 10 minutes early secondary to patient's other scheduled appointment, so held side-lying exercises  Will re-introduce at next session  Plan: Continue per plan of care        Precautions: FALL RISK      Date    Manuals        PROM all planes KS KS CM CM KS   Assessment  KS                      Neuro Re-Ed        Ball Stability        Bilateral ER  nv      Standing TB Row/Ext L3 3x10 L3 2x10 L3 2x10 L3 2x10 L3 3x10   SA Punches 2# Stick 3x10 Stick 2# 2x10 Stick 2# 2x10 Active 2x10 2# Stick 3x10   Bent over on wedge Row/Ext 1# 3x10ea nv 1# 3x10 ea np NV 1# 3x10ea   Bent over on wedge Hz abd 1# 15x  1# 15x np NV 1# 15x           Ther Ex        Stick: FLX & ER 5"x20ea 5"x20ea 5"x20 ea 5"x20 ea 5"x20ea   Posterior Capsule Stretch        S/Lying ER/ABD nv  1# 2x10 ea np NV 1# 3x10ea   Standing TB ER L3 3x10 L2 2x10 L3 3x10 np NV L3 3x10   Standing TB IR L3 3x10 L3 2x10 L3 3x10 np NV L3 3x10   Pulleys 4' Scap 2' scap 4' scap  4' scap 4' Scap   Bicep Curls w/ LAQ 5# 3x10  SL P2 LAQ L3 3x10 5# 3x10 5# 3x10  SL P2 LAQ 5# 3x10  SL P2 LAQ   Tricep Ext L3 3x10 L3 3x10 L3 3x10 L3 2x10 L3 3x10   Nustep  L6 10' UE Endurance  L5 10'  UE ROM L5 10'  UE ROM L6 10' UE Endurance           Ther Activity        Over head lifts        Wall ball        Push ups                Modalities        MHP During Supine Exercises  During PROM  During Supine Exercises

## 2023-02-13 NOTE — PATIENT INSTRUCTIONS
Try to reduce your fluid intake to 40 ounces per day  Continue the sodium tablets 1 tab three times a day with food- REPORT SWELLING  Continue bumex 1 tab per day   Lab work in 1 week

## 2023-02-13 NOTE — PROGRESS NOTES
Nephrology   Office Follow-Up  Gaby Jimenez 65 y o  male MRN: [id number]    Encounter: [id number]        [address] [place] [address]    Gaby Jimenez was seen in the [facility] office today  All diagnoses and orders for visit:     1  Chronic hyponatremia  · Goal serum sodium 125-129 mmol/L  Currently 126 mmol/L  Continues to over hydrate  Recommend 40 ounce fluid restriction  Continue 1 gram NaCl TID + Bumex 1 mg daily  Wean sodium tabs as able due to side effect and risk of edema  Lab work 1 week and return to office in 1 month   -     Basic metabolic panel; Future; Expected date: 02/20/2023   -     Osmolality, urine; Future; Expected date: 02/20/2023  2  Essential hypertension  · Thiazides are absolutely contraindicated  · Blood pressure low normal likely due to underlying pathophysiology  Recommend holding lisinopril if symptoms of hypotension occur  Lisinopril can cause non osmotic ADH release  3  Hx of seizure disorder  · Management per neurology  4  Tobacco abuse  · Continues to smoke 1 ppd  5  Alcoholic cirrhosis of liver without ascites (HCC)  · No overt ascites on exam  Is at risk for re accumulation given high sodium diet  Suspect he is taking in about 7-8 g of sodium today  Remains free of alcohol  Also suspect creatinine is underestimated  HPI: Gaby Jimenez is a 65 y o  male who is here for hospital follow-up  Hospitalized at [facility] for hyponatremia 119 mmol/L  Treated with fluid restriction and discharged on 1 g NaCl TID + bumex 1 mg, daily FR 60 ounces per patient  Discharge sodium 126 mmol/L  Serum sodium 126 mmol/L on outpatient labs  Reduce fluid restriction to 40 ounces per day  Repeat lab in 1 week  Goal to wean sodium tablets  Return to office with me in 2 months  ROS:   Review of Systems   Constitutional: Negative for chills and fever  Thirsty   HENT: Negative for ear pain and sore throat  Eyes: Negative for pain and visual disturbance     Respiratory: Negative for cough and shortness of breath  Cardiovascular: Negative for chest pain and palpitations  Gastrointestinal: Negative for abdominal pain and vomiting  Genitourinary: Negative for dysuria and hematuria  Musculoskeletal: Negative for arthralgias and back pain  Skin: Negative for color change and rash  Neurological: Negative for seizures and syncope  All other systems reviewed and are negative  Allergies: Patient has no known allergies      Medications:   Current Outpatient Medications:   •  bumetanide (BUMEX) 1 mg tablet, Take 1 tablet (1 mg total) by mouth daily, Disp: 30 tablet, Rfl: 2  •  Cholecalciferol 25 MCG (1000 UT) tablet, Take 1 tablet (1,000 Units total) by mouth daily, Disp: 30 tablet, Rfl: 0  •  cyanocobalamin (VITAMIN B-12) 100 mcg tablet, Take 1 tablet (100 mcg total) by mouth daily, Disp: 30 tablet, Rfl: 5  •  Diclofenac Sodium (VOLTAREN) 1 %, Apply 2 g topically 4 (four) times a day, Disp: 100 g, Rfl: 1  •  docusate sodium (COLACE) 100 mg capsule, Take 1 capsule (100 mg total) by mouth 2 (two) times a day as needed for constipation, Disp: 30 capsule, Rfl: 0  •  ergocalciferol (VITAMIN D2) 50,000 units, Take 1 capsule (50,000 Units total) by mouth once a week, Disp: 13 capsule, Rfl: 1  •  folic acid (FOLVITE) 1 mg tablet, Take 1 tablet (1 mg total) by mouth daily, Disp: 30 tablet, Rfl: 0  •  levETIRAcetam (KEPPRA) 1000 MG tablet, Take 1 tablet (1,000 mg total) by mouth every 12 (twelve) hours, Disp: 60 tablet, Rfl: 2  •  lisinopril (ZESTRIL) 2 5 mg tablet, Take 1 tablet (2 5 mg total) by mouth daily, Disp: 90 tablet, Rfl: 2  •  LORazepam (ATIVAN) 0 5 mg tablet, Take 1 tablet (0 5 mg total) by mouth daily at bedtime, Disp: 30 tablet, Rfl: 0  •  Multiple Vitamin (TAB-A-BONI PO), Take 1 tablet by mouth daily, Disp: , Rfl:   •  multivitamin (THERAGRAN) TABS, Take 1 tablet by mouth daily, Disp: , Rfl:   •  sodium chloride 1 g tablet, Take 1 tablet (1 g total) by mouth 3 (three) times a day with meals, Disp: 90 tablet, Rfl: 0  •  thiamine 100 MG tablet, Take 1 tablet (100 mg total) by mouth daily, Disp: 30 tablet, Rfl: 0    Past Medical History:   Diagnosis Date   • Alcohol abuse    • Traore esophagus    • Bowel obstruction (HCC)    • Bowel perforation (HCC)    • Cardiac disease    • Continuous chronic alcoholism ([facility] [address] ) 10/5/2017   • COPD (chronic obstructive pulmonary disease) (HCC)    • History of shoulder surgery     Right shoulder   • History of transfusion    • Hx of cervical spine surgery    • Hypertension    • Incisional hernia 10/8/2017   • [facility], old    • Mitral regurgitation    • Psychiatric disorder    • Seizures ([facility] [address] )      Past Surgical History:   Procedure Laterality Date   • APPENDECTOMY     • BACK SURGERY     • CHOLECYSTECTOMY     • ESOPHAGOGASTRODUODENOSCOPY N/A 11/28/2016    Procedure: ESOPHAGOGASTRODUODENOSCOPY (EGD); Surgeon: Calli Hoffman MD;  Location:  GI LAB; Service:    • GALLBLADDER SURGERY     • LAPAROTOMY N/A 10/25/2016    Procedure: LAPAROTOMY EXPLORATORY;  Surgeon: Rod Keen MD;  Location: [facility];  Service:    • MOUTH SURGERY     • PERCUTANEOUS PINNING FEMORAL NECK FRACTURE     • SHOULDER SURGERY Right    • SHOULDER SURGERY     • SMALL INTESTINE SURGERY     • STOMACH SURGERY      bal surgery     Family History   Problem Relation Age of Onset   • Breast cancer Mother    • Prostate cancer Father    • Skin cancer Brother       reports that he has been smoking cigarettes  He has a 105 00 pack-year smoking history  He has never used smokeless tobacco  He reports current alcohol use of about 42 0 standard drinks per week  He reports that he does not use drugs  Physical Exam:   Vitals:    02/13/23 0907   BP: 90/58   BP Location: Left arm   Patient Position: Sitting   Cuff Size: Standard   Weight: 51 3 kg (113 lb)   Height: 5' 4" (1 626 m)     Body mass index is 19 4 kg/m²      General: conscious, cooperative, in no acute distress, appears stated age  Eyes: conjunctivae pale, anicteric sclerae  ENT: lips and mucous membranes moist  Neck: supple, no JVD, no masses  Chest:  essentially clear breath sounds bilaterally, no crackles, ronchus or wheezings  CVS: S1 & S2, normal rate, regular rhythm  Abdomen: soft, non-tender, non-distended, normoactive bowel sounds, rounded  Extremities: no edema of both legs  Skin: no rash   Neuro: awake, alert, oriented       Diagnostic Data:  Lab: I have personally reviewed pertinent lab results  ,   CBC:       CMP: No results found for: SODIUM, K, CL, CO2, ANIONGAP, BUN, CREATININE, GLUCOSE, CALCIUM, AST, ALT, ALKPHOS, PROT, BILITOT, EGFR,   PT/INR: No results found for: PT, INR,   Magnesium: No components found for: MAG,  Phosphorous: No results found for: PHOS    Patient Instructions   Try to reduce your fluid intake to 40 ounces per day  Continue the sodium tablets 1 tab three times a day with food- REPORT SWELLING  Continue bumex 1 tab per day   Lab work in 1 week       Portions of the record may have been created with voice recognition software  Occasional wrong word or "sound a like" substitutions may have occurred due to the inherent limitations of voice recognition software  Read the chart carefully and recognize, using context, where substitutions have occurred  If you have any questions, please contact the dictating provider

## 2023-02-16 ENCOUNTER — OFFICE VISIT (OUTPATIENT)
Dept: PHYSICAL THERAPY | Facility: CLINIC | Age: 58
End: 2023-02-16

## 2023-02-16 ENCOUNTER — APPOINTMENT (OUTPATIENT)
Dept: PHYSICAL THERAPY | Facility: CLINIC | Age: 58
End: 2023-02-16

## 2023-02-16 DIAGNOSIS — M62.511 ATROPHY OF MUSCLE OF RIGHT SHOULDER: Primary | ICD-10-CM

## 2023-02-16 NOTE — PROGRESS NOTES
Daily Note     Today's date: 2023  Patient name: Ness Sy  : 1965  MRN: [id number]  Referring provider: Sarita Viera PA-C  Dx:   Encounter Diagnosis     ICD-10-CM    1  Atrophy of muscle of right shoulder  M62 511                      Subjective: Patient reports that his R shoulder has crepitus w/o pain when he reaches over head  His ROM is limited in flexion and over head movements  His strength has improved, he does not have as much pain  Objective: See treatment diary below  Increased resistance on TB TE today  Incorporated standing scap/abd  Assessment: Tolerated treatment well  Patient demonstrated fatigue post treatment, exhibited good technique with therapeutic exercises and would benefit from continued PT to improve R shoulder ROM and strength  He tolerated treatment well and was able to tolerate more resistance and reps w/o pain  Crepitus with over head movement and PROM w/o pain  Plan: Continue per plan of care        Precautions: FALL RISK      Date    Manuals        PROM all planes KS CM CM CM KS   Assessment                        Neuro Re-Ed        Ball Stability        Bilateral ER        Standing TB Row/Ext L3 3x10 L4 2x10 L3 2x10 L3 2x10 L3 3x10   SA Punches 2# Stick 3x10 3# stick 3x10 Stick 2# 2x10 Active 2x10 2# Stick 3x10   Bent over on wedge Row/Ext 1# 3x10ea 2# 3x10 ea 1# 3x10 ea np NV 1# 3x10ea   Bent over on wedge Hz abd 1# 15x 2# 2x10 1# 15x np NV 1# 15x           Ther Ex        Stick: FLX & ER 5"x20ea 5"x20 ea 5"x20 ea 5"x20 ea 5"x20ea   Posterior Capsule Stretch        S/Lying ER/ABD nv 1# 2x10 ea 1# 2x10 ea np NV 1# 3x10ea   Standing TB ER L3 3x10 L4 2x10 L3 3x10 np NV L3 3x10   Standing TB IR L3 3x10 L4 2x10 L3 3x10 np NV L3 3x10   Pulleys 4' Scap 4' scap  4' scap  4' scap 4' Scap   Bicep Curls w/ LAQ 5# 3x10  SL P2 LAQ 5# 3x10  SL P2 LAQ 5# 3x10 5# 3x10  SL P2 LAQ 5# 3x10  SL P2 LAQ   Tricep Ext L3 3x10 L5 3x10 L3 3x10 L3 2x10 L3 3x10   Nustep  L6 10' UE Endurance L6 10' UE Endurance L5 10'  UE ROM L5 10'  UE ROM L6 10' UE Endurance           Ther Activity        Standing Scaption  10x       Standing abduction  10x       Push ups                Modalities        MHP During Supine Exercises During Supine Exercises During PROM  During Supine Exercises

## 2023-02-20 ENCOUNTER — OFFICE VISIT (OUTPATIENT)
Dept: PHYSICAL THERAPY | Facility: CLINIC | Age: 58
End: 2023-02-20

## 2023-02-20 ENCOUNTER — APPOINTMENT (OUTPATIENT)
Dept: PHYSICAL THERAPY | Facility: CLINIC | Age: 58
End: 2023-02-20

## 2023-02-20 ENCOUNTER — APPOINTMENT (OUTPATIENT)
Dept: LAB | Facility: MEDICAL CENTER | Age: 58
End: 2023-02-20

## 2023-02-20 DIAGNOSIS — E87.1 CHRONIC HYPONATREMIA: ICD-10-CM

## 2023-02-20 DIAGNOSIS — R26.2 AMBULATORY DYSFUNCTION: Primary | ICD-10-CM

## 2023-02-20 DIAGNOSIS — M62.511 ATROPHY OF MUSCLE OF RIGHT SHOULDER: ICD-10-CM

## 2023-02-20 LAB
ANION GAP SERPL CALCULATED.3IONS-SCNC: 7 MMOL/L (ref 4–13)
BUN SERPL-MCNC: 15 MG/DL (ref 5–25)
CALCIUM SERPL-MCNC: 9.7 MG/DL (ref 8.3–10.1)
CHLORIDE SERPL-SCNC: 89 MMOL/L (ref 96–108)
CO2 SERPL-SCNC: 25 MMOL/L (ref 21–32)
CREAT SERPL-MCNC: 0.83 MG/DL (ref 0.6–1.3)
GFR SERPL CREATININE-BSD FRML MDRD: 98 ML/MIN/1.73SQ M
GLUCOSE P FAST SERPL-MCNC: 91 MG/DL (ref 65–99)
OSMOLALITY UR: 350 MMOL/KG
POTASSIUM SERPL-SCNC: 4.7 MMOL/L (ref 3.5–5.3)
SODIUM SERPL-SCNC: 121 MMOL/L (ref 135–147)

## 2023-02-20 NOTE — PROGRESS NOTES
Daily Note     Today's date: 2023  Patient name: Marcos Zambrano  : 1965  MRN: [id number]  Referring provider: Shi Frank PA-C  Dx:   Encounter Diagnosis     ICD-10-CM    1  Ambulatory dysfunction  R26 2       2  Atrophy of muscle of right shoulder  M62 511                      Subjective: Patient reports that his R shoulder strength is improving  Still can't reach over head to put plates away into a cabinet  Objective: See treatment diary below  Increased resistance on TB TE today  Assessment: Tolerated treatment well  Patient demonstrated fatigue post treatment, exhibited good technique with therapeutic exercises and would benefit from continued PT to improve R shoulder ROM and strength  Noted increase in strength due to being able to tolerate progression w/ no issues or pain  Plan: Continue per plan of care        Precautions: FALL RISK      Date    Manuals        PROM all planes KS CM CM CM KS   Assessment                        Neuro Re-Ed        Ball Stability        Bilateral ER        Standing TB Row/Ext L3 3x10 L4 2x10 L5 2x10 L3 2x10 L3 3x10   SA Punches 2# Stick 3x10 3# stick 3x10 Stick 3# 2x10 Active 2x10 2# Stick 3x10   Bent over on wedge Row/Ext 1# 3x10ea 2# 3x10 ea 2# 3x10 ea np NV 1# 3x10ea   Bent over on wedge Hz abd 1# 15x 2# 2x10 2# 2x10 np NV 1# 15x           Ther Ex        Stick: FLX & ER 5"x20ea 5"x20 ea 5"x20 ea 5"x20 ea 5"x20ea   Posterior Capsule Stretch        S/Lying ER/ABD nv 1# 2x10 ea 1# 2x10 ea np NV 1# 3x10ea   Standing TB ER L3 3x10 L4 2x10 L5 3x10 np NV L3 3x10   Standing TB IR L3 3x10 L4 2x10 L5 3x10 np NV L3 3x10   Pulleys 4' Scap 4' scap  4' scap  4' scap 4' Scap   Bicep Curls w/ LAQ 5# 3x10  SL P2 LAQ 5# 3x10  SL P2 LAQ 5# 3x10  SL P2 LAQ 5# 3x10  SL P2 LAQ 5# 3x10  SL P2 LAQ   Tricep Ext L3 3x10 L5 3x10 L5 3x10 L3 2x10 L3 3x10   Nustep  L6 10' UE Endurance L6 10' UE Endurance L6 10'  UE ROM L5 10'  UE ROM L6 10' UE Endurance Ther Activity        Standing Scaption  10x  3x10     Standing abduction  10x  2x10     Push ups                Modalities        MHP During Supine Exercises During Supine Exercises During PROM  During Supine Exercises

## 2023-02-22 ENCOUNTER — TELEPHONE (OUTPATIENT)
Dept: NEPHROLOGY | Facility: CLINIC | Age: 58
End: 2023-02-22

## 2023-02-22 DIAGNOSIS — E87.1 HYPONATREMIA: Primary | ICD-10-CM

## 2023-02-22 DIAGNOSIS — R60.9 FLUID RETENTION: ICD-10-CM

## 2023-02-22 RX ORDER — BUMETANIDE 1 MG/1
1 TABLET ORAL DAILY
Qty: 30 TABLET | Refills: 2 | Status: CANCELLED | OUTPATIENT
Start: 2023-02-22

## 2023-02-22 RX ORDER — BUMETANIDE 1 MG/1
1 TABLET ORAL 2 TIMES DAILY
Qty: 60 TABLET | Refills: 3 | Status: SHIPPED | OUTPATIENT
Start: 2023-02-22 | End: 2023-03-01

## 2023-02-22 NOTE — TELEPHONE ENCOUNTER
Patient stated he called Dr Adina Cohen for a refill for bumex 1 mg daily  Patient asked if a new script could be sent in for the twice a day

## 2023-02-23 ENCOUNTER — APPOINTMENT (OUTPATIENT)
Dept: PHYSICAL THERAPY | Facility: CLINIC | Age: 58
End: 2023-02-23

## 2023-02-23 ENCOUNTER — APPOINTMENT (OUTPATIENT)
Dept: LAB | Facility: MEDICAL CENTER | Age: 58
End: 2023-02-23

## 2023-02-23 ENCOUNTER — OFFICE VISIT (OUTPATIENT)
Dept: PHYSICAL THERAPY | Facility: CLINIC | Age: 58
End: 2023-02-23

## 2023-02-23 DIAGNOSIS — M62.511 ATROPHY OF MUSCLE OF RIGHT SHOULDER: ICD-10-CM

## 2023-02-23 DIAGNOSIS — E87.1 HYPONATREMIA: ICD-10-CM

## 2023-02-23 DIAGNOSIS — G40.909 SEIZURE DISORDER (HCC): ICD-10-CM

## 2023-02-23 DIAGNOSIS — R26.2 AMBULATORY DYSFUNCTION: Primary | ICD-10-CM

## 2023-02-23 LAB
ANION GAP SERPL CALCULATED.3IONS-SCNC: 9 MMOL/L (ref 4–13)
BUN SERPL-MCNC: 16 MG/DL (ref 5–25)
CALCIUM SERPL-MCNC: 9.2 MG/DL (ref 8.3–10.1)
CHLORIDE SERPL-SCNC: 87 MMOL/L (ref 96–108)
CO2 SERPL-SCNC: 24 MMOL/L (ref 21–32)
CORTIS AM PEAK SERPL-MCNC: 13.4 UG/DL (ref 4.2–22.4)
CREAT SERPL-MCNC: 0.84 MG/DL (ref 0.6–1.3)
GFR SERPL CREATININE-BSD FRML MDRD: 97 ML/MIN/1.73SQ M
GLUCOSE P FAST SERPL-MCNC: 78 MG/DL (ref 65–99)
POTASSIUM SERPL-SCNC: 4.8 MMOL/L (ref 3.5–5.3)
SODIUM SERPL-SCNC: 120 MMOL/L (ref 135–147)

## 2023-02-23 NOTE — PROGRESS NOTES
Daily Note     Today's date: 2023  Patient name: Alia Pratt  : 1965  MRN: [id number]  Referring provider: Faye Wild PA-C  Dx:   Encounter Diagnosis     ICD-10-CM    1  Ambulatory dysfunction  R26 2       2  Atrophy of muscle of right shoulder  M62 511                      Subjective: Pt offers no new complaints  Continues to note difficulty w/ reaching OH  Objective: See treatment diary below      Assessment: Tolerated treatment well  Greatest ROM limitations into abduction and flexion  Patient demonstrated fatigue post treatment, exhibited good technique with therapeutic exercises and would benefit from continued PT       Plan: Continue per plan of care        Precautions: FALL RISK      Date     Manuals        PROM all planes KS CM CM VR    Assessment                        Neuro Re-Ed        Ball Stability        Bilateral ER        Standing TB Row/Ext L3 3x10 L4 2x10 L5 2x10     SA Punches 2# Stick 3x10 3# stick 3x10 Stick 3# 2x10 Stick 3# 2x10    Bent over on wedge Row/Ext 1# 3x10ea 2# 3x10 ea 2# 3x10 ea 2# 3z10 ea    Bent over on wedge Hz abd 1# 15x 2# 2x10 2# 2x10 2# 3x10             Ther Ex        Stick: FLX & ER 5"x20ea 5"x20 ea 5"x20 ea     Posterior Capsule Stretch        S/Lying ER/ABD nv 1# 2x10 ea 1# 2x10 ea     Standing TB ER L3 3x10 L4 2x10 L5 3x10 L5 3x10    Standing TB IR L3 3x10 L4 2x10 L5 3x10 L5 3x10    Pulleys 4' Scap 4' scap  4' scap  4' scap    Bicep Curls w/ LAQ 5# 3x10  SL P2 LAQ 5# 3x10  SL P2 LAQ 5# 3x10  SL P2 LAQ 5# 3x10  SL P2 LAQ    Tricep Ext L3 3x10 L5 3x10 L5 3x10 L5 3x10    Nustep  L6 10' UE Endurance L6 10' UE Endurance L6 10'  UE ROM L6 10'  UE ROM            Ther Activity        Standing Scaption  10x  3x10 1# 2x10    Standing abduction  10x  2x10 1# 2x10    Push ups                Modalities        MHP During Supine Exercises During Supine Exercises During PROM During supine to lumbar

## 2023-02-25 ENCOUNTER — HOSPITAL ENCOUNTER (INPATIENT)
Facility: HOSPITAL | Age: 58
LOS: 4 days | Discharge: HOME | End: 2023-03-01
Attending: EMERGENCY MEDICINE | Admitting: INTERNAL MEDICINE

## 2023-02-25 ENCOUNTER — APPOINTMENT (INPATIENT)
Dept: CT IMAGING | Facility: HOSPITAL | Age: 58
End: 2023-02-25

## 2023-02-25 DIAGNOSIS — R16.0 HEPATOMEGALY: ICD-10-CM

## 2023-02-25 DIAGNOSIS — E16.2 HYPOGLYCEMIA: ICD-10-CM

## 2023-02-25 DIAGNOSIS — E87.1 HYPONATREMIA: Primary | ICD-10-CM

## 2023-02-25 DIAGNOSIS — G40.909 SEIZURE DISORDER (HCC): ICD-10-CM

## 2023-02-25 DIAGNOSIS — I95.9 HYPOTENSION: ICD-10-CM

## 2023-02-25 DIAGNOSIS — N30.01 ACUTE CYSTITIS WITH HEMATURIA: ICD-10-CM

## 2023-02-25 DIAGNOSIS — Z72.0 TOBACCO ABUSE: ICD-10-CM

## 2023-02-25 PROBLEM — R82.81 PYURIA: Status: ACTIVE | Noted: 2023-02-25

## 2023-02-25 PROBLEM — F10.11 HISTORY OF ALCOHOL ABUSE: Status: ACTIVE | Noted: 2023-02-25

## 2023-02-25 LAB
ALBUMIN SERPL BCP-MCNC: 4.4 G/DL (ref 3.5–5)
ALP SERPL-CCNC: 96 U/L (ref 34–104)
ALT SERPL W P-5'-P-CCNC: 9 U/L (ref 7–52)
AMPHETAMINES SERPL QL SCN: NEGATIVE
ANION GAP SERPL CALCULATED.3IONS-SCNC: 7 MMOL/L (ref 4–13)
ANION GAP SERPL CALCULATED.3IONS-SCNC: 9 MMOL/L (ref 4–13)
AST SERPL W P-5'-P-CCNC: 22 U/L (ref 13–39)
BACTERIA UR QL AUTO: ABNORMAL /HPF
BARBITURATES UR QL: NEGATIVE
BASOPHILS # BLD AUTO: 0.05 THOUSANDS/ÂΜL (ref 0–0.1)
BASOPHILS NFR BLD AUTO: 1 % (ref 0–1)
BENZODIAZ UR QL: NEGATIVE
BILIRUB SERPL-MCNC: 0.78 MG/DL (ref 0.2–1)
BILIRUB UR QL STRIP: NEGATIVE
BUN SERPL-MCNC: 14 MG/DL (ref 5–25)
BUN SERPL-MCNC: 19 MG/DL (ref 5–25)
CALCIUM SERPL-MCNC: 8.6 MG/DL (ref 8.4–10.2)
CALCIUM SERPL-MCNC: 9.5 MG/DL (ref 8.4–10.2)
CHLORIDE SERPL-SCNC: 78 MMOL/L (ref 96–108)
CHLORIDE SERPL-SCNC: 86 MMOL/L (ref 96–108)
CLARITY UR: ABNORMAL
CO2 SERPL-SCNC: 22 MMOL/L (ref 21–32)
CO2 SERPL-SCNC: 27 MMOL/L (ref 21–32)
COCAINE UR QL: NEGATIVE
COLOR UR: ABNORMAL
CREAT SERPL-MCNC: 0.83 MG/DL (ref 0.6–1.3)
CREAT SERPL-MCNC: 0.87 MG/DL (ref 0.6–1.3)
CREAT UR-MCNC: 24.1 MG/DL
EOSINOPHIL # BLD AUTO: 0.06 THOUSAND/ÂΜL (ref 0–0.61)
EOSINOPHIL NFR BLD AUTO: 1 % (ref 0–6)
ERYTHROCYTE [DISTWIDTH] IN BLOOD BY AUTOMATED COUNT: 13.1 % (ref 11.6–15.1)
FLUAV RNA RESP QL NAA+PROBE: NEGATIVE
FLUBV RNA RESP QL NAA+PROBE: NEGATIVE
GFR SERPL CREATININE-BSD FRML MDRD: 96 ML/MIN/1.73SQ M
GFR SERPL CREATININE-BSD FRML MDRD: 98 ML/MIN/1.73SQ M
GLUCOSE SERPL-MCNC: 117 MG/DL (ref 65–140)
GLUCOSE SERPL-MCNC: 62 MG/DL (ref 65–140)
GLUCOSE UR STRIP-MCNC: ABNORMAL MG/DL
HCT VFR BLD AUTO: 38.4 % (ref 36.5–49.3)
HGB BLD-MCNC: 14.1 G/DL (ref 12–17)
HGB UR QL STRIP.AUTO: ABNORMAL
HOLD SPECIMEN: NORMAL
IMM GRANULOCYTES # BLD AUTO: 0.05 THOUSAND/UL (ref 0–0.2)
IMM GRANULOCYTES NFR BLD AUTO: 1 % (ref 0–2)
KETONES UR STRIP-MCNC: NEGATIVE MG/DL
LEUKOCYTE ESTERASE UR QL STRIP: ABNORMAL
LEVETIRACETAM SERPL-MCNC: 44.1 UG/ML (ref 10–40)
LYMPHOCYTES # BLD AUTO: 1.67 THOUSANDS/ÂΜL (ref 0.6–4.47)
LYMPHOCYTES NFR BLD AUTO: 17 % (ref 14–44)
MCH RBC QN AUTO: 35.1 PG (ref 26.8–34.3)
MCHC RBC AUTO-ENTMCNC: 36.7 G/DL (ref 31.4–37.4)
MCV RBC AUTO: 96 FL (ref 82–98)
METHADONE UR QL: NEGATIVE
MONOCYTES # BLD AUTO: 1.21 THOUSAND/ÂΜL (ref 0.17–1.22)
MONOCYTES NFR BLD AUTO: 12 % (ref 4–12)
NEUTROPHILS # BLD AUTO: 6.82 THOUSANDS/ÂΜL (ref 1.85–7.62)
NEUTS SEG NFR BLD AUTO: 68 % (ref 43–75)
NITRITE UR QL STRIP: NEGATIVE
NON-SQ EPI CELLS URNS QL MICRO: ABNORMAL /HPF
NRBC BLD AUTO-RTO: 0 /100 WBCS
OPIATES UR QL SCN: NEGATIVE
OSMOLALITY UR/SERPL-RTO: 239 MMOL/KG (ref 282–298)
OSMOLALITY UR: 189 MMOL/KG
OXYCODONE+OXYMORPHONE UR QL SCN: NEGATIVE
PCP UR QL: NEGATIVE
PH UR STRIP.AUTO: 7 [PH]
PLATELET # BLD AUTO: 247 THOUSANDS/UL (ref 149–390)
PMV BLD AUTO: 8.7 FL (ref 8.9–12.7)
POTASSIUM SERPL-SCNC: 4.3 MMOL/L (ref 3.5–5.3)
POTASSIUM SERPL-SCNC: 4.6 MMOL/L (ref 3.5–5.3)
PROT SERPL-MCNC: 7.9 G/DL (ref 6.4–8.4)
PROT UR STRIP-MCNC: NEGATIVE MG/DL
RBC # BLD AUTO: 4.02 MILLION/UL (ref 3.88–5.62)
RBC #/AREA URNS AUTO: ABNORMAL /HPF
RSV RNA RESP QL NAA+PROBE: NEGATIVE
SARS-COV-2 RNA RESP QL NAA+PROBE: NEGATIVE
SODIUM 24H UR-SCNC: 24 MOL/L
SODIUM SERPL-SCNC: 112 MMOL/L (ref 135–147)
SODIUM SERPL-SCNC: 117 MMOL/L (ref 135–147)
SP GR UR STRIP.AUTO: <=1.005 (ref 1–1.03)
THC UR QL: NEGATIVE
UROBILINOGEN UR QL STRIP.AUTO: 0.2 E.U./DL
WBC # BLD AUTO: 9.86 THOUSAND/UL (ref 4.31–10.16)
WBC #/AREA URNS AUTO: ABNORMAL /HPF

## 2023-02-25 RX ORDER — MIDODRINE HYDROCHLORIDE 5 MG/1
5 TABLET ORAL ONCE
Status: COMPLETED | OUTPATIENT
Start: 2023-02-25 | End: 2023-02-25

## 2023-02-25 RX ORDER — MELATONIN
1000 DAILY
Status: DISCONTINUED | OUTPATIENT
Start: 2023-02-25 | End: 2023-03-01 | Stop reason: HOSPADM

## 2023-02-25 RX ORDER — LORAZEPAM 0.5 MG/1
0.5 TABLET ORAL
Status: DISCONTINUED | OUTPATIENT
Start: 2023-02-25 | End: 2023-03-01 | Stop reason: HOSPADM

## 2023-02-25 RX ORDER — NICOTINE 21 MG/24HR
14 PATCH, TRANSDERMAL 24 HOURS TRANSDERMAL DAILY
Status: DISCONTINUED | OUTPATIENT
Start: 2023-02-25 | End: 2023-03-01 | Stop reason: HOSPADM

## 2023-02-25 RX ORDER — DIPHENHYDRAMINE HCL 25 MG
25 TABLET ORAL EVERY 6 HOURS PRN
Status: DISCONTINUED | OUTPATIENT
Start: 2023-02-25 | End: 2023-03-01 | Stop reason: HOSPADM

## 2023-02-25 RX ORDER — CEFTRIAXONE 1 G/50ML
1000 INJECTION, SOLUTION INTRAVENOUS EVERY 24 HOURS
Status: DISCONTINUED | OUTPATIENT
Start: 2023-02-25 | End: 2023-03-01 | Stop reason: HOSPADM

## 2023-02-25 RX ORDER — ENOXAPARIN SODIUM 100 MG/ML
40 INJECTION SUBCUTANEOUS EVERY 24 HOURS
Status: DISCONTINUED | OUTPATIENT
Start: 2023-02-25 | End: 2023-03-01 | Stop reason: HOSPADM

## 2023-02-25 RX ORDER — ONDANSETRON 2 MG/ML
4 INJECTION INTRAMUSCULAR; INTRAVENOUS EVERY 6 HOURS PRN
Status: DISCONTINUED | OUTPATIENT
Start: 2023-02-25 | End: 2023-03-01 | Stop reason: HOSPADM

## 2023-02-25 RX ORDER — DOCUSATE SODIUM 100 MG/1
100 CAPSULE, LIQUID FILLED ORAL 2 TIMES DAILY PRN
Status: DISCONTINUED | OUTPATIENT
Start: 2023-02-25 | End: 2023-03-01 | Stop reason: HOSPADM

## 2023-02-25 RX ORDER — DEXTROSE MONOHYDRATE 25 G/50ML
25 INJECTION, SOLUTION INTRAVENOUS ONCE
Status: COMPLETED | OUTPATIENT
Start: 2023-02-25 | End: 2023-02-25

## 2023-02-25 RX ORDER — LEVETIRACETAM 500 MG/1
1000 TABLET ORAL EVERY 12 HOURS SCHEDULED
Status: DISCONTINUED | OUTPATIENT
Start: 2023-02-25 | End: 2023-02-26

## 2023-02-25 RX ADMIN — SODIUM CHLORIDE 250 ML: 0.9 INJECTION, SOLUTION INTRAVENOUS at 15:31

## 2023-02-25 RX ADMIN — CEFTRIAXONE 1000 MG: 1 INJECTION, SOLUTION INTRAVENOUS at 14:06

## 2023-02-25 RX ADMIN — IOHEXOL 100 ML: 350 INJECTION, SOLUTION INTRAVENOUS at 10:27

## 2023-02-25 RX ADMIN — LORAZEPAM 0.5 MG: 0.5 TABLET ORAL at 21:16

## 2023-02-25 RX ADMIN — THIAMINE HYDROCHLORIDE: 100 INJECTION, SOLUTION INTRAMUSCULAR; INTRAVENOUS at 11:13

## 2023-02-25 RX ADMIN — DEXTROSE MONOHYDRATE 25 ML: 25 INJECTION, SOLUTION INTRAVENOUS at 09:05

## 2023-02-25 RX ADMIN — DIPHENHYDRAMINE HYDROCHLORIDE 25 MG: 25 TABLET ORAL at 11:56

## 2023-02-25 RX ADMIN — Medication 100 MCG: at 14:09

## 2023-02-25 RX ADMIN — MULTIPLE VITAMINS W/ MINERALS TAB 1 TABLET: TAB at 14:05

## 2023-02-25 RX ADMIN — MIDODRINE HYDROCHLORIDE 5 MG: 5 TABLET ORAL at 17:42

## 2023-02-25 RX ADMIN — CHOLECALCIFEROL TAB 25 MCG (1000 UNIT) 1000 UNITS: 25 TAB at 14:06

## 2023-02-25 RX ADMIN — LEVETIRACETAM 1000 MG: 500 TABLET, FILM COATED ORAL at 14:05

## 2023-02-25 RX ADMIN — SODIUM CHLORIDE 250 ML: 0.9 INJECTION, SOLUTION INTRAVENOUS at 17:42

## 2023-02-25 RX ADMIN — DIPHENHYDRAMINE HYDROCHLORIDE 25 MG: 25 TABLET ORAL at 21:19

## 2023-02-25 RX ADMIN — SODIUM CHLORIDE 500 ML: 0.9 INJECTION, SOLUTION INTRAVENOUS at 12:32

## 2023-02-25 RX ADMIN — ENOXAPARIN SODIUM 40 MG: 40 INJECTION SUBCUTANEOUS at 14:05

## 2023-02-25 NOTE — ED PROVIDER NOTES
History  Chief Complaint   Patient presents with   • Abnormal Lab     Pt sent here by PCP for low sodium level, states it was 120  HPI  15-year-old male past medical history significant for alcohol use, chronic hyponatremia, seizure disorder, COPD presented to the ER for evaluation of low sodium on outpatient labs  Patient reports outpatient labs on 2/20/2023 revealed a sodium of 121 patient had repeat outpatient blood work obtained on 2/23/2023 which revealed a sodium of 120 and the patient was advised to come to the ER for evaluation at that time  He presents today 2/25 for this outpatient lab from 2 days ago  Reports no significant symptoms denies headache confusion significant thirst weakness numbness tingling seizures blurry vision shortness of breath chest pain leg swelling increased urination  Reports compliance with medications  Patient reports a history of alcohol use he reports 10 months ago he switched to nonalcoholic beer which he still drinks  Prior to Admission Medications   Prescriptions Last Dose Informant Patient Reported? Taking?    Cholecalciferol 25 MCG (1000 UT) tablet Unknown  No No   Sig: Take 1 tablet (1,000 Units total) by mouth daily   Diclofenac Sodium (VOLTAREN) 1 % 2/24/2023  No Yes   Sig: Apply 2 g topically 4 (four) times a day   LORazepam (ATIVAN) 0 5 mg tablet 2/24/2023  No Yes   Sig: Take 1 tablet (0 5 mg total) by mouth daily at bedtime   Multiple Vitamin (TAB-A-BONI PO) 2/24/2023  Yes Yes   Sig: Take 1 tablet by mouth daily   bumetanide (BUMEX) 1 mg tablet 2/24/2023  No Yes   Sig: Take 1 tablet (1 mg total) by mouth 2 (two) times a day   cyanocobalamin (VITAMIN B-12) 100 mcg tablet 2/24/2023  No Yes   Sig: Take 1 tablet (100 mcg total) by mouth daily   docusate sodium (COLACE) 100 mg capsule 2/24/2023  No Yes   Sig: Take 1 capsule (100 mg total) by mouth 2 (two) times a day as needed for constipation   ergocalciferol (VITAMIN D2) 50,000 units 2/24/2023  No Yes   Sig: Take 1 capsule (50,000 Units total) by mouth once a week   folic acid (FOLVITE) 1 mg tablet 2/24/2023  No Yes   Sig: Take 1 tablet (1 mg total) by mouth daily   levETIRAcetam (KEPPRA) 1000 MG tablet 2/24/2023  No Yes   Sig: Take 1 tablet (1,000 mg total) by mouth every 12 (twelve) hours   lisinopril (ZESTRIL) 2 5 mg tablet 2/24/2023  No Yes   Sig: Take 1 tablet (2 5 mg total) by mouth daily   multivitamin [facility]) TABS 2/24/2023  Yes Yes   Sig: Take 1 tablet by mouth daily   sodium chloride 1 g tablet 2/24/2023  No Yes   Sig: Take 1 tablet (1 g total) by mouth 3 (three) times a day with meals   thiamine 100 MG tablet 2/24/2023  No Yes   Sig: Take 1 tablet (100 mg total) by mouth daily      Facility-Administered Medications: None       Past Medical History:   Diagnosis Date   • Alcohol abuse    • Traore esophagus    • Bowel obstruction (HCC)    • Bowel perforation (HCC)    • Cardiac disease    • Continuous chronic alcoholism ([facility] [address] ) 10/5/2017   • COPD (chronic obstructive pulmonary disease) (HCC)    • History of shoulder surgery     Right shoulder   • History of transfusion    • Hx of cervical spine surgery    • Hypertension    • Incisional hernia 10/8/2017   • MI, old    • Mitral regurgitation    • Psychiatric disorder    • Seizures ([facility] [address] )        Past Surgical History:   Procedure Laterality Date   • APPENDECTOMY     • BACK SURGERY     • CHOLECYSTECTOMY     • ESOPHAGOGASTRODUODENOSCOPY N/A 11/28/2016    Procedure: ESOPHAGOGASTRODUODENOSCOPY (EGD); Surgeon: Brittany Ramirez MD;  Location: [facility];   Service:    • GALLBLADDER SURGERY     • LAPAROTOMY N/A 10/25/2016    Procedure: Brittnee Stanfordk;  Surgeon: Say Vázquez MD;  Location: [facility];  Service:    • MOUTH SURGERY     • PERCUTANEOUS PINNING FEMORAL NECK FRACTURE     • SHOULDER SURGERY Right    • SHOULDER SURGERY     • SMALL INTESTINE SURGERY     • STOMACH SURGERY      bal surgery       Family History   Problem Relation Age of Onset   • Breast cancer Mother    • Prostate cancer Father    • Skin cancer Brother      I have reviewed and agree with the history as documented  E-Cigarette/Vaping   • E-Cigarette Use Former User      E-Cigarette/Vaping Substances   • Nicotine No    • THC No    • CBD No    • Flavoring No    • Other No    • Unknown No      Social History     Tobacco Use   • Smoking status: Every Day     Packs/day: 3 00     Years: 35 00     Pack years: 105 00     Types: Cigarettes   • Smokeless tobacco: Never   Vaping Use   • Vaping Use: Former   Substance Use Topics   • Alcohol use: Yes     Alcohol/week: 42 0 standard drinks     Types: 42 Cans of beer per week     Comment: a six pack a day   • Drug use: No       Review of Systems    Physical Exam  Physical Exam  Vitals and nursing note reviewed  Exam conducted with a chaperone present  Constitutional:       General: He is not in acute distress  Appearance: Normal appearance  He is well-developed  He is not ill-appearing  HENT:      Head: Normocephalic and atraumatic  Right Ear: External ear normal       Left Ear: External ear normal       Nose: Nose normal       Mouth/Throat:      Mouth: Mucous membranes are moist       Pharynx: Oropharynx is clear  Eyes:      General: No scleral icterus  Extraocular Movements: Extraocular movements intact  Conjunctiva/sclera: Conjunctivae normal       Pupils: Pupils are equal, round, and reactive to light  Cardiovascular:      Rate and Rhythm: Normal rate and regular rhythm  Heart sounds: No murmur heard  Pulmonary:      Effort: Pulmonary effort is normal  No respiratory distress  Breath sounds: Normal breath sounds  Abdominal:      Palpations: Abdomen is soft  Tenderness: There is no abdominal tenderness  Musculoskeletal:         General: No swelling  Cervical back: Neck supple  Right lower leg: No edema  Left lower leg: No edema  Skin:     General: Skin is warm and dry        Capillary Refill: Capillary refill takes less than 2 seconds  Neurological:      General: No focal deficit present  Mental Status: He is alert  Mental status is at baseline  Psychiatric:         Mood and Affect: Mood normal          Vital Signs  ED Triage Vitals [02/25/23 0808]   Temperature Pulse Respirations Blood Pressure SpO2   98 1 °F (36 7 °C) 75 16 114/74 98 %      Temp Source Heart Rate Source Patient Position - Orthostatic VS BP Location FiO2 (%)   Temporal Monitor Lying Left arm --      Pain Score       No Pain           Vitals:    02/25/23 0808 02/25/23 0930 02/25/23 0947   BP: 114/74 98/72 104/73   Pulse: 75 77 87   Patient Position - Orthostatic VS: Lying Lying          Visual Acuity      ED Medications  Medications   folic acid 1 mg, thiamine (VITAMIN B1) 100 mg in sodium chloride 0 9 % 100 mL IV piggyback (has no administration in time range)   dextrose 50 % IV solution 25 mL (25 mL Intravenous Given 2/25/23 0905)       Diagnostic Studies  Results Reviewed     Procedure Component Value Units Date/Time    [place] draw [[id number]] Collected: 02/25/23 0830    Lab Status: Final result Specimen: Blood from Arm, Right Updated: 02/25/23 1001    Narrative: The following orders were created for panel order [place] draw  Procedure                               Abnormality         Status                     ---------                               -----------         ------                     Ilene Erica Top on UXMO[[id number]]                           Final result               Gold top on NOZO[[id number]]                                 Final result               Green / Black tube on KSSJ[[id number]]                       Final result                 Please view results for these tests on the individual orders      Rapid drug screen, urine [[id number]]     Lab Status: No result Specimen: Urine     COVID19, Influenza A/B, RSV PCR, [facility] [[id number]]     Lab Status: No result Specimen: Nares from Nasopharyngeal Swab Urinalysis with microscopic [[id number]]     Lab Status: No result Specimen: Urine     Urine culture [[id number]]     Lab Status: No result Specimen: Urine, Clean Catch     UA w Reflex to Microscopic w Reflex to Culture [[id number]]     Lab Status: No result Specimen: Urine, Clean Catch     Osmolality, urine [[id number]]     Lab Status: No result Specimen: Urine     Osmolality-"If this is regarding a toxic alcohol, please STOP and consult medical  for further guidance " [[id number]] Collected: 02/25/23 0830    Lab Status:  In process Specimen: Blood from Arm, Right Updated: 02/25/23 0908    Sodium, urine, random [[id number]]     Lab Status: No result Specimen: Urine     Creatinine, urine, random [[id number]]     Lab Status: No result Specimen: Urine     Comprehensive metabolic panel [[id number]]  (Abnormal) Collected: 02/25/23 0830    Lab Status: Final result Specimen: Blood from Arm, Right Updated: 02/25/23 0901     Sodium 112 mmol/L      Potassium 4 6 mmol/L      Chloride 78 mmol/L      CO2 27 mmol/L      ANION GAP 7 mmol/L      BUN 14 mg/dL      Creatinine 0 83 mg/dL      Glucose 62 mg/dL      Calcium 9 5 mg/dL      AST 22 U/L      ALT 9 U/L      Alkaline Phosphatase 96 U/L      Total Protein 7 9 g/dL      Albumin 4 4 g/dL      Total Bilirubin 0 78 mg/dL      eGFR 98 ml/min/1 73sq m     Narrative:      [place] guidelines for Chronic Kidney Disease (CKD):   •  Stage 1 with normal or high GFR (GFR > 90 mL/min/1 73 square meters)  •  Stage 2 Mild CKD (GFR = 60-89 mL/min/1 73 square meters)  •  Stage 3A Moderate CKD (GFR = 45-59 mL/min/1 73 square meters)  •  Stage 3B Moderate CKD (GFR = 30-44 mL/min/1 73 square meters)  •  Stage 4 Severe CKD (GFR = 15-29 mL/min/1 73 square meters)  •  Stage 5 End Stage CKD (GFR <15 mL/min/1 73 square meters)  Note: GFR calculation is accurate only with a steady state creatinine    CBC and differential [[id number]]  (Abnormal) Collected: 02/25/23 0830 Lab Status: Final result Specimen: Blood from Arm, Right Updated: 02/25/23 0836     WBC 9 86 Thousand/uL      RBC 4 02 Million/uL      Hemoglobin 14 1 g/dL      Hematocrit 38 4 %      MCV 96 fL      MCH 35 1 pg      MCHC 36 7 g/dL      RDW 13 1 %      MPV 8 7 fL      Platelets 812 Thousands/uL      nRBC 0 /100 WBCs      Neutrophils Relative 68 %      Immat GRANS % 1 %      Lymphocytes Relative 17 %      Monocytes Relative 12 %      Eosinophils Relative 1 %      Basophils Relative 1 %      Neutrophils Absolute 6 82 Thousands/µL      Immature Grans Absolute 0 05 Thousand/uL      Lymphocytes Absolute 1 67 Thousands/µL      Monocytes Absolute 1 21 Thousand/µL      Eosinophils Absolute 0 06 Thousand/µL      Basophils Absolute 0 05 Thousands/µL                  CT chest abdomen pelvis w contrast    (Results Pending)              Procedures  CriticalCare Time  Performed by: Edd Richardson DO  Authorized by: Edd Richardson DO     Critical care provider statement:     Critical care time (minutes):  35    Critical care was necessary to treat or prevent imminent or life-threatening deterioration of the following conditions:  Metabolic crisis    Critical care was time spent personally by me on the following activities:  Ordering and review of laboratory studies, ordering and review of radiographic studies, ordering and performing treatments and interventions, re-evaluation of patient's condition, review of old charts, examination of patient, evaluation of patient's response to treatment, discussions with primary provider, discussions with consultants, development of treatment plan with patient or surrogate and obtaining history from patient or surrogate    I assumed direction of critical care for this patient from another provider in my specialty: no               ED Course  ED Course as of 02/25/23 1010   Sat Feb 25, 2023   0900 Lab, critical result Na 112  Pt no symptoms      0901 Sodium(!!): 112   0901 Glucose, Random(!): 58   0915 Case discussed with nephrology on-call, Sona Green, agrees with plan to admit to 's fluid restrict, check q6BMP, will be in to see patient around noon  1010 EKG interpreted by myself  EKG dated 2/25/2023 at 0935 demonstrates normal sinus rhythm 85 bpm, normal CT interval, normal QRS interval with left axis deviation, no STEMI, normal QTc interval                                              Medical Decision Making  52-year-old male with history of chronic hyponatremia history of alcohol use patient reports nonalcoholic beer but concerns for overhydration per nephrology presenting for evaluation of recent outpatient labs revealing a downtrending sodium with labs several days ago sodium 121 and then 2 days ago sodium 120 presents here today for evaluation of this  Reports no significant symptoms and appears nondistressed with stable vital signs  Labs here revealed a critically low sodium at 116  Also low glucose at 62  Will fluid restrict the patient  No seizure-like activity or severe symptoms requiring emergency administration of hypertonic fluids  Case discussed with nephrology on-call recommending fluid restriction 6-hour BMP will come and see the patient once on campus in a few hours  Case discussed with admitting provider Dr Syl Vela will admit as inpatient status  Given fluid restriction glucose was addressed with intravenous dextrose (half amp of D50)  Hypoglycemia: acute illness or injury that poses a threat to life or bodily functions  Hyponatremia: chronic illness or injury with severe exacerbation, progression, or side effects of treatment that poses a threat to life or bodily functions  Amount and/or Complexity of Data Reviewed  External Data Reviewed: labs and notes  Labs: ordered  Decision-making details documented in ED Course  ECG/medicine tests: ordered and independent interpretation performed   Decision-making details documented in ED Course  Risk  Prescription drug management  Decision regarding hospitalization  Critical Care  Total time providing critical care: 30-74 minutes      Disposition  Final diagnoses:   Hyponatremia   Hypoglycemia     Time reflects when diagnosis was documented in both MDM as applicable and the Disposition within this note     Time User Action Codes Description Comment    2/25/2023  9:19 AM Hulen Meals Add [E87 1] Hyponatremia     2/25/2023  9:21 AM Hulen Meals Add [E16 2] Hypoglycemia       ED Disposition     ED Disposition   Admit    Condition   Stable    Date/Time   Sat Feb 25, 2023  9:21 AM    Comment   Case was discussed with [facility] and the patient's admission status was agreed to be Admission Status: inpatient status to the service of Dr Claudio De La Cruz              Follow-up Information    None         Current Discharge Medication List      CONTINUE these medications which have NOT CHANGED    Details   bumetanide (BUMEX) 1 mg tablet Take 1 tablet (1 mg total) by mouth 2 (two) times a day  Qty: 60 tablet, Refills: 3    Associated Diagnoses: Fluid retention      cyanocobalamin (VITAMIN B-12) 100 mcg tablet Take 1 tablet (100 mcg total) by mouth daily  Qty: 30 tablet, Refills: 5    Associated Diagnoses: Alcohol abuse      Diclofenac Sodium (VOLTAREN) 1 % Apply 2 g topically 4 (four) times a day  Qty: 100 g, Refills: 1    Associated Diagnoses: Left medial knee pain      docusate sodium (COLACE) 100 mg capsule Take 1 capsule (100 mg total) by mouth 2 (two) times a day as needed for constipation  Qty: 30 capsule, Refills: 0    Associated Diagnoses: Constipation, unspecified constipation type      ergocalciferol (VITAMIN D2) 50,000 units Take 1 capsule (50,000 Units total) by mouth once a week  Qty: 13 capsule, Refills: 1    Associated Diagnoses: Vitamin D deficiency      folic acid (FOLVITE) 1 mg tablet Take 1 tablet (1 mg total) by mouth daily  Qty: 30 tablet, Refills: 0    Associated Diagnoses: Folic acid deficiency      levETIRAcetam (KEPPRA) 1000 MG tablet Take 1 tablet (1,000 mg total) by mouth every 12 (twelve) hours  Qty: 60 tablet, Refills: 2    Associated Diagnoses: Seizure disorder (HCC)      lisinopril (ZESTRIL) 2 5 mg tablet Take 1 tablet (2 5 mg total) by mouth daily  Qty: 90 tablet, Refills: 2    Associated Diagnoses: Chronic hyponatremia      LORazepam (ATIVAN) 0 5 mg tablet Take 1 tablet (0 5 mg total) by mouth daily at bedtime  Qty: 30 tablet, Refills: 0    Associated Diagnoses: Insomnia, unspecified type      !! Multiple Vitamin (TAB-A-BONI PO) Take 1 tablet by mouth daily      !! multivitamin (THERAGRAN) TABS Take 1 tablet by mouth daily      sodium chloride 1 g tablet Take 1 tablet (1 g total) by mouth 3 (three) times a day with meals  Qty: 90 tablet, Refills: 0    Associated Diagnoses: Hyponatremia      thiamine 100 MG tablet Take 1 tablet (100 mg total) by mouth daily  Qty: 30 tablet, Refills: 0    Associated Diagnoses: Alcohol abuse      Cholecalciferol 25 MCG (1000 UT) tablet Take 1 tablet (1,000 Units total) by mouth daily  Qty: 30 tablet, Refills: 0    Associated Diagnoses: Alcohol abuse       !! - Potential duplicate medications found  Please discuss with provider  No discharge procedures on file      PDMP Review       Value Time User    PDMP Reviewed  Yes 2/7/2023  1:43 PM Suzanne Reyes DO          ED Provider  Electronically Signed by           Peter Bosch DO  02/25/23 1011

## 2023-02-25 NOTE — ASSESSMENT & PLAN NOTE
· Outpatient follow-up with Gastroenterology    CT scan of the chest/abdomen/pelvis (02/25/2023):  PANCREAS:  Large pancreatic head pseudocyst measuring 6 4 x 6 5 cm, stable allowing for differences in measurement technique  Some peripheral calcifications noted as before  Continued prominence of the main pancreatic duct may reflect partial obstruction from the pseudocyst itself  Again correlate with lipase  Chronic pancreatic pseudocyst, stable

## 2023-02-25 NOTE — ASSESSMENT & PLAN NOTE
· Check an AM cortisol level on 02/26/2023  · Check a HgbA1c level  · Monitor the blood glucose trend  · Hypoglycemia protocol

## 2023-02-25 NOTE — CONSULTS
Mirella Wiley 65 y o  male MRN: [id number]  Unit/Bed#: 417-21 Encounter: [id number]        Assessment and Plan:    1  Acute on chronic moderate asymptomatic hypoosmolar hyponatremia  · Serum sodium 120 mmol/L-> 112 mmol/L today  Patient adamant that he did not over hydrate  Has a history of liver cirrhosis and potentially SIADH  Urine osmolality typically elevated  Deviously treated for hypocortisolism however most recent cortisol levels acceptable and this will be repeated tomorrow  · Trial 500 mL normal saline and repeat BMP afterwards  Fluid restrict 1 L/day  · We will need plan prior to discharge  Has hyponatremia despite 3 g salt tab a day plus Bumex 1 -2 mg/day  2   Alcoholic cirrhosis of the liver without ascites  · Contributing to hyponatremia  Sodium will likely not correct past mid to high 120s  3  Essential hypertension  · Blood pressure within patient's typical range given underlying cirrhosis  Lisinopril may contribute to nonasthmatic ADH release  Thiazides contraindicated  4  Seizure disorder    HPI:    Mac Bonlila is a 65 y o  male with neck hyponatremia potentially due to SIADH, hypertension, history of seizure disorder, smoker, alcoholic cirrhosis of the liver without ascites, history of alcohol abuse who presented at my direction to [facility] ER for hyponatremia  Serum sodium noted to be 120 mmol/L on outpatient lab work  He presented and was found to have a serum sodium of 112 mmol/L  He states he did not change his diet or fluid intake  He did not have any nausea, vomiting, diarrhea  He did not take sodium tablet or diuretic yesterday as he was supposed to present to the ER however he did not present until today  Nephrology perspective, follows with this group for hyponatremia  Goal serum sodium 125 to 129 mmol/L  Current regimen includes 40 ounce fluid restriction per day, 1 g sodium tabs 3 times daily and Bumex 1 mg daily    I recently increased his Bumex to 2 mg daily however he only did this for 1 day before presenting to the hospital     Reason for Consult: Acute on chronic moderate asymptomatic hypoosmolar hyponatremia    Review of Systems:  A complete 10-point review of systems was performed  Aside from what was mentioned in the HPI, it is otherwise negative  Historical Information   Past Medical History:   Diagnosis Date   • Alcohol abuse    • Traore esophagus    • Bowel obstruction (HCC)    • Bowel perforation (HCC)    • Cardiac disease    • Continuous chronic alcoholism ([facility] [address] ) 10/5/2017   • COPD (chronic obstructive pulmonary disease) (HCC)    • History of shoulder surgery     Right shoulder   • History of transfusion    • Hx of cervical spine surgery    • Hypertension    • Incisional hernia 10/8/2017   • MI, old    • Mitral regurgitation    • Psychiatric disorder    • Seizures ([facility] [address] )      Past Surgical History:   Procedure Laterality Date   • APPENDECTOMY     • BACK SURGERY     • CHOLECYSTECTOMY     • ESOPHAGOGASTRODUODENOSCOPY N/A 11/28/2016    Procedure: ESOPHAGOGASTRODUODENOSCOPY (EGD); Surgeon: Trini Schofield MD;  [facility]: [facility];   Service:    • GALLBLADDER SURGERY     • LAPAROTOMY N/A 10/25/2016    Procedure: LAPAROTOMY EXPLORATORY;  Surgeon: Gill Garland MD;  Location: [facility];  Service:    • MOUTH SURGERY     • PERCUTANEOUS PINNING FEMORAL NECK FRACTURE     • SHOULDER SURGERY Right    • SHOULDER SURGERY     • SMALL INTESTINE SURGERY     • STOMACH SURGERY      bal surgery     Social History   Social History     Substance and Sexual Activity   Alcohol Use Yes   • Alcohol/week: 42 0 standard drinks   • Types: 42 Cans of beer per week    Comment: a six pack a day     Social History     Substance and Sexual Activity   Drug Use No     Social History     Tobacco Use   Smoking Status Every Day   • Packs/day: 3 00   • Years: 35 00   • Pack years: 105 00   • Types: Cigarettes   Smokeless Tobacco Never       Family History:   Family History   Problem Relation Age of Onset   • Breast cancer Mother    • Prostate cancer Father    • Skin cancer Brother        Medications:  Pertinent medications were reviewed  Current Facility-Administered Medications   Medication Dose Route Frequency Provider Last Rate   • diphenhydrAMINE  25 mg Oral Q6H PRN Heather Salinas DO     • folic acid 1 mg, thiamine 100 mg in 0 9% sodium chloride 100 mL IVPB   Intravenous Daily Heather Salinas DO     • sodium chloride  500 mL Intravenous Once LORA Johnson 500 mL (02/25/23 1232)         No Known Allergies      Vitals:   /73   Pulse 87   Temp 97 8 °F (36 6 °C) (Oral)   Resp 16   Ht 5' 4" (1 626 m)   Wt 51 3 kg (113 lb 3 2 oz)   SpO2 100%   BMI 19 43 kg/m²   Body mass index is 19 43 kg/m²  SpO2: 100 %,   SpO2 Activity: At Rest,   O2 Device: None (Room air)      Intake/Output Summary (Last 24 hours) at 2/25/2023 1253  Last data filed at 2/25/2023 1239  Gross per 24 hour   Intake 180 ml   Output 650 ml   Net -470 ml     Invasive Devices     Peripheral Intravenous Line  Duration           Peripheral IV 02/25/23 Right;Ventral (anterior) Forearm <1 day                Physical Exam:  General: conscious, cooperative, in no acute distress  Eyes: conjunctivae pink, anicteric sclerae  ENT: lips and mucous membranes moist  Neck: supple, no JVD, no masses  Chest: clear breath sounds bilateral, no crackles, ronchus or wheezings  CVS: distinct S1 & S2, normal rate, regular rhythm  Abdomen: soft, non-tender, non-distended, normoactive bowel sounds  Extremities: no edema of both legs  Skin: no rash  Neuro: awake, alert, oriented      Diagnostic Data:  Lab: I have personally reviewed pertinent lab results  ,   CBC:  Results from last 7 days   Lab Units 02/25/23  0830   WBC Thousand/uL 9 86   HEMOGLOBIN g/dL 14 1   HEMATOCRIT % 38 4   PLATELETS Thousands/uL 247      CMP:   Lab Results   Component Value Date    SODIUM 112 (LL) 02/25/2023    K 4 6 02/25/2023    CL 78 (L) 02/25/2023    CO2 27 02/25/2023    BUN 14 02/25/2023    CREATININE 0 83 02/25/2023    CALCIUM 9 5 02/25/2023    AST 22 02/25/2023    ALT 9 02/25/2023    ALKPHOS 96 02/25/2023    EGFR 98 02/25/2023   ,   PT/INR: No results found for: PT, INR,   Magnesium: No components found for: MAG,  Phosphorous: No results found for: PHOS    Microbiology:  @LABRCNTIP,(urinecx:7)@        LORA Veras    Portions of the record may have been created with voice recognition software  Occasional wrong word or "sound a like" substitutions may have occurred due to the inherent limitations of voice recognition software  Read the chart carefully and recognize, using context, where substitutions have occurred

## 2023-02-25 NOTE — ASSESSMENT & PLAN NOTE
· Recent elevated keppra level on 02/23/2023  · Will recheck to confirm its a true elevation  · Continue keppra 1000 mg PO every 12 hours  · Outpatient follow-up with Neurology       Latest Reference Range & Units 02/23/23 09:17   LEVETIRACETA (KEPPRA) 10 0 - 40 0 ug/mL 44 1 (H)   (H): Data is abnormally high

## 2023-02-25 NOTE — H&P
[address]  H&P- Maricel Lo 1965, 65 y o  male MRN: [id number]  Unit/Bed#: 833-91 Encounter: [id number]  Primary Care Provider: Nigel Gross DO   Date and time admitted to hospital: 2/25/2023  8:15 AM    * Hyponatremia  Assessment & Plan  · Asymptomatic hypo-osmolar hyponatremia  · I appreciate Nephrology's input  · Give 500 ml of NSS IV fluids and recheck a BMP level  · May require tolvaptan  · Fluid restriction of 1000 ml/24 hours  · No sign of malignancy on CT scan of the chest/abdomen/pelvis with IV contrast completed on 02/25/2023    Results from last 7 days   Lab Units 02/25/23  0830 02/23/23  0917 02/20/23  0924   SODIUM mmol/L 112* 120* 121*   POTASSIUM mmol/L 4 6 4 8 4 7   CHLORIDE mmol/L 78* 87* 89*   CO2 mmol/L 27 24 25   BUN mg/dL 14 16 15   CREATININE mg/dL 0 83 0 84 0 83   CALCIUM mg/dL 9 5 9 2 9 7         Seizure disorder (HCC)  Assessment & Plan  · Recent elevated keppra level on 02/23/2023  · Will recheck to confirm its a true elevation  · Continue keppra 1000 mg PO every 12 hours  · Outpatient follow-up with Neurology       Latest Reference Range & Units 02/23/23 09:17   LEVETIRACETA (KEPPRA) 10 0 - 40 0 ug/mL 44 1 (H)   (H): Data is abnormally high    Pyuria  Assessment & Plan  · Probable acute cystitis  · Check a urine culture  · Treat with ceftriaxone 1000 mg IV every 24 hours    Hypoglycemia  Assessment & Plan  · Check an AM cortisol level on 02/26/2023  · Check a HgbA1c level  · Monitor the blood glucose trend  · Hypoglycemia protocol    History of alcohol abuse  Assessment & Plan  · The patient states that for the last 10 months he is only drink non-alcoholic beer, which he currently drinks 3 per day    · Give folic acid 1 mg IV Qdaily and thiamine 100 mg IV Qdaily  · Daily PO multivitamin    Pancreatic pseudocyst  Assessment & Plan  · Outpatient follow-up with Gastroenterology    CT scan of the chest/abdomen/pelvis (02/25/2023):  PANCREAS:  Large pancreatic head pseudocyst measuring 6 4 x 6 5 cm, stable allowing for differences in measurement technique  Some peripheral calcifications noted as before  Continued prominence of the main pancreatic duct may reflect partial obstruction from the pseudocyst itself  Again correlate with lipase  Chronic pancreatic pseudocyst, stable  Tobacco use  Assessment & Plan  · Smoked 3-4 ppd of cigarettes for many years  · Currently smokes 1 ppd of cigarettes  · Nicotine patch  · Smoking cessation counseling    VTE Pharmacologic Prophylaxis: VTE Score: 3 Moderate Risk (Score 3-4) - Pharmacological DVT Prophylaxis Ordered: enoxaparin (Lovenox)  Code Status: Level 1 - Full Code    Anticipated Length of Stay: Patient will be admitted on an inpatient basis with an anticipated length of stay of greater than 2 midnights secondary to the need for IV antibiotic treatment, the need for IV fluids, and for serial laboratory testing to monitor the sodium level  Total Time Spent on Date of Encounter in care of patient: 75 minutes This time was spent on one or more of the following: performing physical exam; counseling and coordination of care; obtaining or reviewing history; documenting in the medical record; reviewing/ordering tests, medications or procedures; communicating with other healthcare professionals and discussing with patient's family/caregivers  Chief Complaint:  Hyponatremia    History of Present Illness:  Shital Munroe is a 65 y o  male who presents to the emergency department under the direction of Nephrology for worsening hyponatremia  The patient's sodium level was found to be 120 mmol/L on 2/23/2023 and is now 112 mmol/L today, 02/25/2023  The patient states that he was taking his PO bumex as prescribed and taking the PO sodium chloride tablets  He also claims to be following his daily fluid restriction as instructed  No chest pain  No shortness of breath  No nausea or vomiting    No diarrhea  Review of Systems:  Review of Systems:  Per HPI, all other systems have been reviewed and were negative  Past Medical and Surgical History:   Past Medical History:   Diagnosis Date   • Alcohol abuse    • Traore esophagus    • Bowel obstruction (HCC)    • Bowel perforation ([facility] [address] )    • Cardiac disease    • Continuous chronic alcoholism ([facility] [address] ) 10/5/2017   • COPD (chronic obstructive pulmonary disease) (HCC)    • History of shoulder surgery     Right shoulder   • History of transfusion    • Hx of cervical spine surgery    • Hypertension    • Incisional hernia 10/8/2017   • MI, old    • Mitral regurgitation    • Psychiatric disorder    • Seizures ([facility] [address] )        Past Surgical History:   Procedure Laterality Date   • APPENDECTOMY     • BACK SURGERY     • CHOLECYSTECTOMY     • ESOPHAGOGASTRODUODENOSCOPY N/A 11/28/2016    Procedure: ESOPHAGOGASTRODUODENOSCOPY (EGD); Surgeon: Alex Fairchild MD;  Location: [facility]; Service:    • GALLBLADDER SURGERY     • LAPAROTOMY N/A 10/25/2016    Procedure: Aldo Washington;  Surgeon: Aram Scott MD;  Location: [facility];  Service:    • MOUTH SURGERY     • PERCUTANEOUS PINNING FEMORAL NECK FRACTURE     • SHOULDER SURGERY Right    • SHOULDER SURGERY     • SMALL INTESTINE SURGERY     • STOMACH SURGERY      bal surgery       Meds/Allergies:  Prior to Admission medications    Medication Sig Start Date End Date Taking?  Authorizing Provider   bumetanide (BUMEX) 1 mg tablet Take 1 tablet (1 mg total) by mouth 2 (two) times a day 2/22/23  Yes LORA Veras   cyanocobalamin (VITAMIN B-12) 100 mcg tablet Take 1 tablet (100 mcg total) by mouth daily 7/17/18  Yes Keshia Smith PA-C   Diclofenac Sodium (VOLTAREN) 1 % Apply 2 g topically 4 (four) times a day 9/26/22  Yes Bennett Felty, DO   docusate sodium (COLACE) 100 mg capsule Take 1 capsule (100 mg total) by mouth 2 (two) times a day as needed for constipation 8/29/20  Yes Nora Booker DO ergocalciferol (VITAMIN D2) 50,000 units Take 1 capsule (50,000 Units total) by mouth once a week 12/12/22  Yes LORA Russell   folic acid (FOLVITE) 1 mg tablet Take 1 tablet (1 mg total) by mouth daily 8/29/20  Yes Jennifer Salinas DO   levETIRAcetam (KEPPRA) 1000 MG tablet Take 1 tablet (1,000 mg total) by mouth every 12 (twelve) hours 2/6/23  Yes LORA Russell   lisinopril (ZESTRIL) 2 5 mg tablet Take 1 tablet (2 5 mg total) by mouth daily 1/30/23  Yes Ayush Murphy DO   LORazepam (ATIVAN) 0 5 mg tablet Take 1 tablet (0 5 mg total) by mouth daily at bedtime 2/6/23  Yes LORA Russell   Multiple Vitamin (TAB-A-BONI PO) Take 1 tablet by mouth daily   Yes Historical Provider, MD   multivitamin [facility]) TABS Take 1 tablet by mouth daily 7/13/22 7/13/23 Yes Historical Provider, MD   sodium chloride 1 g tablet Take 1 tablet (1 g total) by mouth 3 (three) times a day with meals 2/3/23  Yes Chadd Roca MD   thiamine 100 MG tablet Take 1 tablet (100 mg total) by mouth daily 8/29/20  Yes Jennifer Salinas DO   Cholecalciferol 25 MCG (1000 UT) tablet Take 1 tablet (1,000 Units total) by mouth daily 8/29/20   Lorin Rice DO     I have reviewed home medications with patient personally      Allergies: No Known Allergies    Social History:  Marital Status: Single     Substance Use History:   Social History     Substance and Sexual Activity   Alcohol Use Yes   • Alcohol/week: 42 0 standard drinks   • Types: 42 Cans of beer per week    Comment: a six pack a day     Social History     Tobacco Use   Smoking Status Every Day   • Packs/day: 3 00   • Years: 35 00   • Pack years: 105 00   • Types: Cigarettes   Smokeless Tobacco Never     Social History     Substance and Sexual Activity   Drug Use No       Family History:  Non-contributory    Physical Exam:     Vitals:   Blood Pressure: 104/73 (02/25/23 0947)  Pulse: 87 (02/25/23 0947)  Temperature: 97 8 °F (36 6 °C) (02/25/23 5122)  Temp Source: Oral (02/25/23 0947)  Respirations: 16 (02/25/23 0930)  Height: 5' 4" (162 6 cm) (02/25/23 0957)  Weight - Scale: 51 3 kg (113 lb 3 2 oz) (02/25/23 0957)  SpO2: 100 % (02/25/23 0947)    Physical Exam   General:  NAD, follows commands  HEENT:  NC/AT, mucous membranes moist  Neck:  Supple, No JVP elevation  CV:  + S1, + S2, RRR  Pulm:  Lung fields are CTA bilaterally  Abd:  Soft, Non-tender, Non-distended  Ext:  No clubbing/cyanosis/edema  Skin:  No rashes  Neuro:  Awake, alert, oriented  Psych:  Normal mood and affect      Additional Data:     Lab Results:  Results from last 7 days   Lab Units 02/25/23  0830   WBC Thousand/uL 9 86   HEMOGLOBIN g/dL 14 1   HEMATOCRIT % 38 4   PLATELETS Thousands/uL 247   NEUTROS PCT % 68   LYMPHS PCT % 17   MONOS PCT % 12   EOS PCT % 1     Results from last 7 days   Lab Units 02/25/23  0830   SODIUM mmol/L 112*   POTASSIUM mmol/L 4 6   CHLORIDE mmol/L 78*   CO2 mmol/L 27   BUN mg/dL 14   CREATININE mg/dL 0 83   ANION GAP mmol/L 7   CALCIUM mg/dL 9 5   ALBUMIN g/dL 4 4   TOTAL BILIRUBIN mg/dL 0 78   ALK PHOS U/L 96   ALT U/L 9   AST U/L 22   GLUCOSE RANDOM mg/dL 62*                       Lines/Drains:  Invasive Devices     Peripheral Intravenous Line  Duration           Peripheral IV 02/25/23 Right;Ventral (anterior) Forearm <1 day                    Imaging: Reviewed radiology reports from this admission including: chest CT scan and abdominal/pelvic CT  CT chest abdomen pelvis w contrast   Final Result by Arcelia Erickson MD (02/25 1211)   1  No suspected malignancy throughout the chest, abdomen or pelvis  2   Chronic pancreatic pseudocyst, stable  3   Old fractures throughout the chest, abdomen and pelvis  Workstation performed: [id number]             ** Please Note: This note has been constructed using a voice recognition system   **

## 2023-02-25 NOTE — ASSESSMENT & PLAN NOTE
· The patient states that for the last 10 months he is only drink non-alcoholic beer, which he currently drinks 3 per day    · Give folic acid 1 mg IV Qdaily and thiamine 100 mg IV Qdaily  · Daily PO multivitamin

## 2023-02-25 NOTE — ASSESSMENT & PLAN NOTE
· Smoked 3-4 ppd of cigarettes for many years  · Currently smokes 1 ppd of cigarettes  · Nicotine patch  · Smoking cessation counseling

## 2023-02-25 NOTE — PLAN OF CARE
Problem: MOBILITY - ADULT  Goal: Maintain or return to baseline ADL function  Description: INTERVENTIONS:  -  Assess patient's ability to carry out ADLs; assess patient's baseline for ADL function and identify physical deficits which impact ability to perform ADLs (bathing, care of mouth/teeth, toileting, grooming, dressing, etc )  - Assess/evaluate cause of self-care deficits   - Assess range of motion  - Assess patient's mobility; develop plan if impaired  - Assess patient's need for assistive devices and provide as appropriate  - Encourage maximum independence but intervene and supervise when necessary  - Involve family in performance of ADLs  - Assess for home care needs following discharge   - Consider OT consult to assist with ADL evaluation and planning for discharge  - Provide patient education as appropriate  Outcome: Progressing  Goal: Maintains/Returns to pre admission functional level  Description: INTERVENTIONS:  - Perform BMAT or MOVE assessment daily    - Set and communicate daily mobility goal to care team and patient/family/caregiver  - Collaborate with rehabilitation services on mobility goals if consulted  - Perform Range of Motion 3 times a day  - Reposition patient every 2 hours    - Dangle patient 3 times a day  - Stand patient 3 times a day  - Ambulate patient 3 times a day  - Out of bed to chair 3 times a day   - Out of bed for meals 3 times a day  - Out of bed for toileting  - Record patient progress and toleration of activity level   Outcome: Progressing     Problem: PAIN - ADULT  Goal: Verbalizes/displays adequate comfort level or baseline comfort level  Description: Interventions:  - Encourage patient to monitor pain and request assistance  - Assess pain using appropriate pain scale  - Administer analgesics based on type and severity of pain and evaluate response  - Implement non-pharmacological measures as appropriate and evaluate response  - Consider cultural and social influences on pain and pain management  - Notify physician/advanced practitioner if interventions unsuccessful or patient reports new pain  Outcome: Progressing     Problem: SAFETY ADULT  Goal: Maintain or return to baseline ADL function  Description: INTERVENTIONS:  -  Assess patient's ability to carry out ADLs; assess patient's baseline for ADL function and identify physical deficits which impact ability to perform ADLs (bathing, care of mouth/teeth, toileting, grooming, dressing, etc )  - Assess/evaluate cause of self-care deficits   - Assess range of motion  - Assess patient's mobility; develop plan if impaired  - Assess patient's need for assistive devices and provide as appropriate  - Encourage maximum independence but intervene and supervise when necessary  - Involve family in performance of ADLs  - Assess for home care needs following discharge   - Consider OT consult to assist with ADL evaluation and planning for discharge  - Provide patient education as appropriate  Outcome: Progressing  Goal: Maintains/Returns to pre admission functional level  Description: INTERVENTIONS:  - Perform BMAT or MOVE assessment daily    - Set and communicate daily mobility goal to care team and patient/family/caregiver  - Collaborate with rehabilitation services on mobility goals if consulted  - Perform Range of Motion 3 times a day  - Reposition patient every 2 hours    - Dangle patient 3 times a day  - Stand patient 3 times a day  - Ambulate patient 3 times a day  - Out of bed to chair 3 times a day   - Out of bed for meals 3 times a day  - Out of bed for toileting  - Record patient progress and toleration of activity level   Outcome: Progressing  Goal: Patient will remain free of falls  Description: INTERVENTIONS:  - Educate patient/family on patient safety including physical limitations  - Instruct patient to call for assistance with activity   - Consult OT/PT to assist with strengthening/mobility   - Keep Call bell within reach  - Keep bed low and locked with side rails adjusted as appropriate  - Keep care items and personal belongings within reach  - Initiate and maintain comfort rounds  - Make Fall Risk Sign visible to staff  - Offer Toileting every 2 Hours, in advance of need  - Initiate/Maintain bed/chair alarm  - Obtain necessary fall risk management equipment: bed/chair alarm, call bell within reach, nonskid socks  - Apply yellow socks and bracelet for high fall risk patients  - Consider moving patient to room near nurses station  Outcome: Progressing     Problem: DISCHARGE PLANNING  Goal: Discharge to home or other facility with appropriate resources  Description: INTERVENTIONS:  - Identify barriers to discharge w/patient and caregiver  - Arrange for needed discharge resources and transportation as appropriate  - Identify discharge learning needs (meds, wound care, etc )  - Arrange for interpretive services to assist at discharge as needed  - Refer to Case Management Department for coordinating discharge planning if the patient needs post-hospital services based on physician/advanced practitioner order or complex needs related to functional status, cognitive ability, or social support system  Outcome: Progressing     Problem: HEMATOLOGIC - ADULT  Goal: Maintains hematologic stability  Description: INTERVENTIONS  - Assess for signs and symptoms of bleeding or hemorrhage  - Monitor labs  - Administer supportive blood products/factors as ordered and appropriate  Outcome: Progressing     Problem: MUSCULOSKELETAL - ADULT  Goal: Maintain or return mobility to safest level of function  Description: INTERVENTIONS:  - Assess patient's ability to carry out ADLs; assess patient's baseline for ADL function and identify physical deficits which impact ability to perform ADLs (bathing, care of mouth/teeth, toileting, grooming, dressing, etc )  - Assess/evaluate cause of self-care deficits   - Assess range of motion  - Assess patient's mobility  - Assess patient's need for assistive devices and provide as appropriate  - Encourage maximum independence but intervene and supervise when necessary  - Involve family in performance of ADLs  - Assess for home care needs following discharge   - Consider OT consult to assist with ADL evaluation and planning for discharge  - Provide patient education as appropriate  Outcome: Progressing  Goal: Maintain proper alignment of affected body part  Description: INTERVENTIONS:  - Support, maintain and protect limb and body alignment  - Provide patient/ family with appropriate education  Outcome: Progressing

## 2023-02-25 NOTE — UTILIZATION REVIEW
NOTIFICATION OF INPATIENT ADMISSION   AUTHORIZATION REQUEST   SERVICING FACILITY:   [address] [place] [address]  [address], [place], [address]  Tax ID:  [id number]  NPI: [id number] ATTENDING PROVIDER:  Attending Name and NPI#: Marylee Julieta [[id number]]  Address: [address]  Phone: [phone]     ADMISSION INFORMATION:  Place of Service: Inpatient [facility]  [address]  Place of Service Code: 21  Inpatient Admission Date/Time: 2/25/23  9:21 AM  Discharge Date/Time: No discharge date for patient encounter  Admitting Diagnosis Code/Description:  Hyponatremia [E87 1]  Hypoglycemia [E16 2]  Abnormal laboratory test result [R89 9]     UTILIZATION REVIEW CONTACT:  Raul Ambrocio Utilization   Network Utilization Review Department  Phone: [phone]  Fax [phone]  Email: [email or web address]  Contact for approvals/pending authorizations, clinical reviews, and discharge  PHYSICIAN ADVISORY SERVICES:  Medical Necessity Denial & [facility]nsa[facility]lo[facility] Review  Phone: [phone]  Fax: [phone]  Email: [email or web address]

## 2023-02-25 NOTE — ASSESSMENT & PLAN NOTE
· Probable acute cystitis  · Check a urine culture  · Treat with ceftriaxone 1000 mg IV every 24 hours

## 2023-02-25 NOTE — ASSESSMENT & PLAN NOTE
· Asymptomatic hypo-osmolar hyponatremia  · I appreciate Nephrology's input    · Give 500 ml of NSS IV fluids and recheck a BMP level  · May require tolvaptan  · Fluid restriction of 1000 ml/24 hours  · No sign of malignancy on CT scan of the chest/abdomen/pelvis with IV contrast completed on 02/25/2023    Results from last 7 days   Lab Units 02/25/23  0830 02/23/23  0917 02/20/23  0924   SODIUM mmol/L 112* 120* 121*   POTASSIUM mmol/L 4 6 4 8 4 7   CHLORIDE mmol/L 78* 87* 89*   CO2 mmol/L 27 24 25   BUN mg/dL 14 16 15   CREATININE mg/dL 0 83 0 84 0 83   CALCIUM mg/dL 9 5 9 2 9 7

## 2023-02-26 PROBLEM — I95.9 HYPOTENSION: Status: ACTIVE | Noted: 2023-02-26

## 2023-02-26 PROBLEM — N30.01 ACUTE CYSTITIS WITH HEMATURIA: Status: ACTIVE | Noted: 2023-02-25

## 2023-02-26 LAB
25(OH)D3 SERPL-MCNC: 64.5 NG/ML (ref 30–100)
ALBUMIN SERPL BCP-MCNC: 3.8 G/DL (ref 3.5–5)
ALP SERPL-CCNC: 88 U/L (ref 34–104)
ALT SERPL W P-5'-P-CCNC: 8 U/L (ref 7–52)
ANION GAP SERPL CALCULATED.3IONS-SCNC: 6 MMOL/L (ref 4–13)
ANION GAP SERPL CALCULATED.3IONS-SCNC: 7 MMOL/L (ref 4–13)
ANION GAP SERPL CALCULATED.3IONS-SCNC: 8 MMOL/L (ref 4–13)
ANION GAP SERPL CALCULATED.3IONS-SCNC: 8 MMOL/L (ref 4–13)
AST SERPL W P-5'-P-CCNC: 19 U/L (ref 13–39)
ATRIAL RATE: 85 BPM
BASOPHILS # BLD AUTO: 0.04 THOUSANDS/ÂΜL (ref 0–0.1)
BASOPHILS NFR BLD AUTO: 1 % (ref 0–1)
BILIRUB DIRECT SERPL-MCNC: 0.13 MG/DL (ref 0–0.2)
BILIRUB SERPL-MCNC: 0.48 MG/DL (ref 0.2–1)
BUN SERPL-MCNC: 18 MG/DL (ref 5–25)
BUN SERPL-MCNC: 22 MG/DL (ref 5–25)
BUN SERPL-MCNC: 26 MG/DL (ref 5–25)
BUN SERPL-MCNC: 26 MG/DL (ref 5–25)
CALCIUM SERPL-MCNC: 8.8 MG/DL (ref 8.4–10.2)
CALCIUM SERPL-MCNC: 9.1 MG/DL (ref 8.4–10.2)
CALCIUM SERPL-MCNC: 9.3 MG/DL (ref 8.4–10.2)
CALCIUM SERPL-MCNC: 9.3 MG/DL (ref 8.4–10.2)
CHLORIDE SERPL-SCNC: 90 MMOL/L (ref 96–108)
CHLORIDE SERPL-SCNC: 92 MMOL/L (ref 96–108)
CO2 SERPL-SCNC: 23 MMOL/L (ref 21–32)
CORTIS AM PEAK SERPL-MCNC: 9 UG/DL (ref 4.2–22.4)
CREAT SERPL-MCNC: 0.83 MG/DL (ref 0.6–1.3)
CREAT SERPL-MCNC: 0.86 MG/DL (ref 0.6–1.3)
CREAT SERPL-MCNC: 0.9 MG/DL (ref 0.6–1.3)
CREAT SERPL-MCNC: 0.92 MG/DL (ref 0.6–1.3)
EOSINOPHIL # BLD AUTO: 0.07 THOUSAND/ÂΜL (ref 0–0.61)
EOSINOPHIL NFR BLD AUTO: 1 % (ref 0–6)
ERYTHROCYTE [DISTWIDTH] IN BLOOD BY AUTOMATED COUNT: 13.4 % (ref 11.6–15.1)
EST. AVERAGE GLUCOSE BLD GHB EST-MCNC: 97 MG/DL
GFR SERPL CREATININE-BSD FRML MDRD: 92 ML/MIN/1.73SQ M
GFR SERPL CREATININE-BSD FRML MDRD: 95 ML/MIN/1.73SQ M
GFR SERPL CREATININE-BSD FRML MDRD: 96 ML/MIN/1.73SQ M
GFR SERPL CREATININE-BSD FRML MDRD: 98 ML/MIN/1.73SQ M
GLUCOSE SERPL-MCNC: 103 MG/DL (ref 65–140)
GLUCOSE SERPL-MCNC: 129 MG/DL (ref 65–140)
GLUCOSE SERPL-MCNC: 80 MG/DL (ref 65–140)
GLUCOSE SERPL-MCNC: 95 MG/DL (ref 65–140)
HAV IGM SER QL: NORMAL
HBA1C MFR BLD: 5 %
HBV CORE IGM SER QL: NORMAL
HBV SURFACE AG SER QL: NORMAL
HCT VFR BLD AUTO: 37.3 % (ref 36.5–49.3)
HCV AB SER QL: NORMAL
HGB BLD-MCNC: 13 G/DL (ref 12–17)
HIV 1+2 AB+HIV1 P24 AG SERPL QL IA: NORMAL
HIV 2 AB SERPL QL IA: NORMAL
HIV1 AB SERPL QL IA: NORMAL
HIV1 P24 AG SERPL QL IA: NORMAL
IMM GRANULOCYTES # BLD AUTO: 0.02 THOUSAND/UL (ref 0–0.2)
IMM GRANULOCYTES NFR BLD AUTO: 0 % (ref 0–2)
LIPASE SERPL-CCNC: 332 U/L (ref 11–82)
LYMPHOCYTES # BLD AUTO: 1.69 THOUSANDS/ÂΜL (ref 0.6–4.47)
LYMPHOCYTES NFR BLD AUTO: 24 % (ref 14–44)
MAGNESIUM SERPL-MCNC: 1.8 MG/DL (ref 1.9–2.7)
MCH RBC QN AUTO: 34.6 PG (ref 26.8–34.3)
MCHC RBC AUTO-ENTMCNC: 34.9 G/DL (ref 31.4–37.4)
MCV RBC AUTO: 99 FL (ref 82–98)
MONOCYTES # BLD AUTO: 0.98 THOUSAND/ÂΜL (ref 0.17–1.22)
MONOCYTES NFR BLD AUTO: 14 % (ref 4–12)
NEUTROPHILS # BLD AUTO: 4.19 THOUSANDS/ÂΜL (ref 1.85–7.62)
NEUTS SEG NFR BLD AUTO: 60 % (ref 43–75)
NRBC BLD AUTO-RTO: 0 /100 WBCS
P AXIS: 48 DEGREES
PHOSPHATE SERPL-MCNC: 2.9 MG/DL (ref 2.7–4.5)
PLATELET # BLD AUTO: 220 THOUSANDS/UL (ref 149–390)
PMV BLD AUTO: 9 FL (ref 8.9–12.7)
POTASSIUM SERPL-SCNC: 3.9 MMOL/L (ref 3.5–5.3)
POTASSIUM SERPL-SCNC: 4.5 MMOL/L (ref 3.5–5.3)
POTASSIUM SERPL-SCNC: 4.6 MMOL/L (ref 3.5–5.3)
POTASSIUM SERPL-SCNC: 4.7 MMOL/L (ref 3.5–5.3)
PR INTERVAL: 174 MS
PROCALCITONIN SERPL-MCNC: 0.05 NG/ML
PROT SERPL-MCNC: 7.1 G/DL (ref 6.4–8.4)
QRS AXIS: -61 DEGREES
QRSD INTERVAL: 74 MS
QT INTERVAL: 362 MS
QTC INTERVAL: 430 MS
RBC # BLD AUTO: 3.76 MILLION/UL (ref 3.88–5.62)
SODIUM SERPL-SCNC: 119 MMOL/L (ref 135–147)
SODIUM SERPL-SCNC: 122 MMOL/L (ref 135–147)
SODIUM SERPL-SCNC: 123 MMOL/L (ref 135–147)
SODIUM SERPL-SCNC: 123 MMOL/L (ref 135–147)
T WAVE AXIS: 6 DEGREES
TSH SERPL DL<=0.05 MIU/L-ACNC: 3.03 UIU/ML (ref 0.45–4.5)
VENTRICULAR RATE: 85 BPM
WBC # BLD AUTO: 6.99 THOUSAND/UL (ref 4.31–10.16)

## 2023-02-26 RX ORDER — MIDODRINE HYDROCHLORIDE 5 MG/1
5 TABLET ORAL
Status: DISCONTINUED | OUTPATIENT
Start: 2023-02-26 | End: 2023-03-01 | Stop reason: HOSPADM

## 2023-02-26 RX ORDER — MAGNESIUM SULFATE 1 G/100ML
1 INJECTION INTRAVENOUS ONCE
Status: COMPLETED | OUTPATIENT
Start: 2023-02-26 | End: 2023-02-26

## 2023-02-26 RX ADMIN — LEVETIRACETAM 1000 MG: 500 TABLET, FILM COATED ORAL at 09:48

## 2023-02-26 RX ADMIN — Medication 100 MCG: at 09:48

## 2023-02-26 RX ADMIN — DIPHENHYDRAMINE HYDROCHLORIDE 25 MG: 25 TABLET ORAL at 09:58

## 2023-02-26 RX ADMIN — MIDODRINE HYDROCHLORIDE 5 MG: 5 TABLET ORAL at 12:04

## 2023-02-26 RX ADMIN — DIPHENHYDRAMINE HYDROCHLORIDE 25 MG: 25 TABLET ORAL at 20:08

## 2023-02-26 RX ADMIN — ENOXAPARIN SODIUM 40 MG: 40 INJECTION SUBCUTANEOUS at 14:28

## 2023-02-26 RX ADMIN — LEVETIRACETAM 1000 MG: 500 TABLET, FILM COATED ORAL at 00:34

## 2023-02-26 RX ADMIN — CEFTRIAXONE 1000 MG: 1 INJECTION, SOLUTION INTRAVENOUS at 13:45

## 2023-02-26 RX ADMIN — MULTIPLE VITAMINS W/ MINERALS TAB 1 TABLET: TAB at 09:47

## 2023-02-26 RX ADMIN — LEVETIRACETAM 750 MG: 250 TABLET, FILM COATED ORAL at 20:08

## 2023-02-26 RX ADMIN — THIAMINE HYDROCHLORIDE: 100 INJECTION, SOLUTION INTRAMUSCULAR; INTRAVENOUS at 09:47

## 2023-02-26 RX ADMIN — LORAZEPAM 0.5 MG: 0.5 TABLET ORAL at 21:50

## 2023-02-26 RX ADMIN — MAGNESIUM SULFATE IN DEXTROSE 1 G: 10 INJECTION, SOLUTION INTRAVENOUS at 09:47

## 2023-02-26 RX ADMIN — MIDODRINE HYDROCHLORIDE 5 MG: 5 TABLET ORAL at 09:59

## 2023-02-26 RX ADMIN — CHOLECALCIFEROL TAB 25 MCG (1000 UNIT) 1000 UNITS: 25 TAB at 09:55

## 2023-02-26 RX ADMIN — MIDODRINE HYDROCHLORIDE 5 MG: 5 TABLET ORAL at 15:50

## 2023-02-26 NOTE — ASSESSMENT & PLAN NOTE
· SBP in the 80's-90's mmHg on 02/25/2023  · Required a total of 1000 ml of NSS IV fluids on 02/25/2023  · Initiated on midodrine 5 mg PO TID  · Consult Endocrinology for possible adrenal insufficiency     Latest Reference Range & Units 02/26/23 06:17   Cortisol - AM 4 2 - 22 4 ug/dL 9 0

## 2023-02-26 NOTE — ASSESSMENT & PLAN NOTE
· Possibly due to adrenal insufficiency  · Monitor the blood glucose trend  · Hypoglycemia protocol  · Consult Endocrinology     Latest Reference Range & Units 02/26/23 06:17   Cortisol - AM 4 2 - 22 4 ug/dL 9 0

## 2023-02-26 NOTE — ASSESSMENT & PLAN NOTE
· Recent elevated keppra level on 02/23/2023  · Will recheck to confirm its a true elevation  · Decrease keppra to 750 mg PO every 12 hours  · Outpatient follow-up with Neurology       Latest Reference Range & Units 02/23/23 09:17   LEVETIRACETA (KEPPRA) 10 0 - 40 0 ug/mL 44 1 (H)   (H): Data is abnormally high

## 2023-02-26 NOTE — ASSESSMENT & PLAN NOTE
· Give magnesium sulfate 1 gram x 1 dose on 02/26/2023  · Follow the magnesium level    Results from last 7 days   Lab Units 02/26/23  0617   MAGNESIUM mg/dL 1 8*

## 2023-02-26 NOTE — ASSESSMENT & PLAN NOTE
· Check a CA 19-9 level  · Outpatient follow-up and surveillance imaging with Gastroenterology    CT scan of the chest/abdomen/pelvis (02/25/2023):  PANCREAS:  Large pancreatic head pseudocyst measuring 6 4 x 6 5 cm, stable allowing for differences in measurement technique  Some peripheral calcifications noted as before  Continued prominence of the main pancreatic duct may reflect partial obstruction from the pseudocyst itself  Again correlate with lipase  Chronic pancreatic pseudocyst, stable

## 2023-02-26 NOTE — PROGRESS NOTES
NEPHROLOGY PROGRESS NOTE   Lucia Workman 65 y o  male MRN: [id number]  Unit/Bed#: 486-30 Encounter: [id number]    Assessment/Plan:    66 yo man with recurrent acute on chronic asymptomatic hyponatremia, alcoholic cirrhosis of liver without ascites, seizure dx, history of hypocortisolism who presented 2/25 for hyponatremia at my direction  1   Acute on chronic moderate asymptomatic hypoosmolar hyponatremia  ? Presented with a serum sodium of 112 mmol/L yesterday morning, asymptomatic, and insistent he is compliant with 40 oz/day FR, NaCl 1g TID, bumex daily  ? Treated with 1 liter NSS and 1 liter/day fluid restriction with appropriate rise and serum sodium remaining around low 120s mmol/L this afternoon  ? Urine chems significant for low osmolality and borderline urine sodium suggestive of volume depletion  Hypotension and borderline AM cortisol also noted  ? Recommend endocrinology consultation  Previously evaluated by Dr Ashish Don for hypocortisolism  I am concerned for adrenal insufficiency  ? Goal serum sodium > 125-129 mmol/L in cirrhotic   ? Widen fluid restriction to 1 5 liters/day and continue serial monitoring of BMP  If serum sodium continues to rise, will treat with D5W  2  hypotension  · ? AI in addition to cirrhotic  Agree with midodrine for now  Avoid further saline as he may overcorrect from a sodium standpoint  AM cortisol was borderline and recommend Endo workup  3  Cystitis without hematuria  · Ceftriaxone per primary team  Potentially a contributing factor to #1  4  Alcoholic cirrhosis of the liver without ascites  ? Contributing to chronic hyponatremia picture  3  Essential hypertension  ? Now hypotensive per #2  4  Seizure disorder        ROS  Examined lying in bed  States he feels dry  A complete 10 point review of systems have been performed and are otherwise negative         Historical Information   Past Medical History:   Diagnosis Date   • Alcohol abuse    • Traore esophagus    • Bowel obstruction [facility])    • Bowel perforation ([facility] [address] )    • Cardiac disease    • Continuous chronic alcoholism ([facility] [address] ) 10/5/2017   • COPD (chronic obstructive pulmonary disease) (HCC)    • History of shoulder surgery     Right shoulder   • History of transfusion    • Hx of cervical spine surgery    • Hypertension    • Incisional hernia 10/8/2017   • MI, old    • Mitral regurgitation    • Psychiatric disorder    • Seizures [facility])      Past Surgical History:   Procedure Laterality Date   • APPENDECTOMY     • BACK SURGERY     • CHOLECYSTECTOMY     • ESOPHAGOGASTRODUODENOSCOPY N/A 11/28/2016    Procedure: ESOPHAGOGASTRODUODENOSCOPY (EGD); Surgeon: Elie Euceda MD;  Location: [facility];   Service:    • GALLBLADDER SURGERY     • LAPAROTOMY N/A 10/25/2016    Procedure: LAPAROTOMY EXPLORATORY;  Surgeon: Parth Farris MD;  Location: [facility];  Service:    • MOUTH SURGERY     • PERCUTANEOUS PINNING FEMORAL NECK FRACTURE     • SHOULDER SURGERY Right    • SHOULDER SURGERY     • SMALL INTESTINE SURGERY     • STOMACH SURGERY      bal surgery     Social History   Social History     Substance and Sexual Activity   Alcohol Use Yes   • Alcohol/week: 42 0 standard drinks   • Types: 42 Cans of beer per week    Comment: a six pack a day     Social History     Substance and Sexual Activity   Drug Use No     Social History     Tobacco Use   Smoking Status Every Day   • Packs/day: 3 00   • Years: 35 00   • Pack years: 105 00   • Types: Cigarettes   Smokeless Tobacco Never       Family History:   Family History   Problem Relation Age of Onset   • Breast cancer Mother    • Prostate cancer Father    • Skin cancer Brother        Medications:  Pertinent medications were reviewed  Current Facility-Administered Medications   Medication Dose Route Frequency Provider Last Rate   • cefTRIAXone  1,000 mg Intravenous Q24H Piotr Salinas DO 1,000 mg (02/26/23 9055)   • cholecalciferol  1,000 Units Oral Daily Piotr Salnias DO     • cyanocobalamin  100 mcg Oral Daily [facility] Brad, DO     • Diclofenac Sodium  2 g Topical 4x Daily [facility]     • diphenhydrAMINE  25 mg Oral Q6H PRN [facility]     • docusate sodium  100 mg Oral BID PRN [facility]     • enoxaparin  40 mg Subcutaneous Q24H [facility]     • folic acid 1 mg, thiamine 100 mg in 0 9% sodium chloride 100 mL IVPB   Intravenous Daily [facility], [facility]     • levETIRAcetam  1,000 mg Oral Q12H [facility]     • LORazepam  0 5 mg Oral HS [facility], [facility]     • midodrine  5 mg Oral TID AC Horris Mates, DO     • multivitamin-minerals  1 tablet Oral Daily [facility], DO     • nicotine  14 mg Transdermal Daily [facility]     • ondansetron  4 mg Intravenous Q6H PRN [facility], [facility]           No Known Allergies      Vitals:   BP 97/69   Pulse 83   Temp 98 4 °F (36 9 °C) (Oral)   Resp 17   Ht 5' 4" (1 626 m)   Wt 51 3 kg (113 lb 1 5 oz)   SpO2 95%   BMI 19 41 kg/m²   Body mass index is 19 41 kg/m²    SpO2: 95 %,   SpO2 Activity: At Rest,   O2 Device: None (Room air)      Intake/Output Summary (Last 24 hours) at 2/26/2023 1416  Last data filed at 2/26/2023 1331  Gross per 24 hour   Intake 540 ml   Output 2100 ml   Net -1560 ml     Invasive Devices     Peripheral Intravenous Line  Duration           Peripheral IV 02/25/23 Right;Ventral (anterior) Forearm 1 day                Physical Exam  General: conscious, cooperative, in no acute distress  Eyes: conjunctivae pink, anicteric sclerae  ENT: lips and mucous membranes moist  Neck: supple, no JVD, no masses  Chest: clear breath sounds bilaterally, no crackles, ronchus or wheezings  CVS: distinct S1 & S2, normal rate, regular rhythm  Abdomen: soft, non-tender, non-distended, normoactive bowel sounds  Extremities: no edema of both legs  Skin: no rash  Neuro: awake, alert, oriented      Diagnostic Data:  Lab: I have personally reviewed pertinent lab results  ,   CBC:  Results from last 7 days   Lab Units 02/26/23  0617   WBC Thousand/uL 6 99   HEMOGLOBIN g/dL 13 0   HEMATOCRIT % 37 3   PLATELETS Thousands/uL 220      CMP:   Lab Results   Component Value Date    SODIUM 122 (L) 02/26/2023    K 4 5 02/26/2023    CL 92 (L) 02/26/2023    CO2 23 02/26/2023    BUN 26 (H) 02/26/2023    CREATININE 0 86 02/26/2023    CALCIUM 9 3 02/26/2023    AST 19 02/26/2023    ALT 8 02/26/2023    ALKPHOS 88 02/26/2023    EGFR 96 02/26/2023   ,   PT/INR: No results found for: PT, INR,   Magnesium: No components found for: MAG,  Phosphorous:   Lab Results   Component Value Date    PHOS 2 9 02/26/2023       Microbiology:  @[email or web address],(urinecx:7)@        LORA Hollis    Portions of the record may have been created with voice recognition software  Occasional wrong word or "sound a like" substitutions may have occurred due to the inherent limitations of voice recognition software  Read the chart carefully and recognize, using context, where substitutions have occurred

## 2023-02-26 NOTE — PLAN OF CARE
Problem: MOBILITY - ADULT  Goal: Maintain or return to baseline ADL function  Description: INTERVENTIONS:  -  Assess patient's ability to carry out ADLs; assess patient's baseline for ADL function and identify physical deficits which impact ability to perform ADLs (bathing, care of mouth/teeth, toileting, grooming, dressing, etc )  - Assess/evaluate cause of self-care deficits   - Assess range of motion  - Assess patient's mobility; develop plan if impaired  - Assess patient's need for assistive devices and provide as appropriate  - Encourage maximum independence but intervene and supervise when necessary  - Involve family in performance of ADLs  - Assess for home care needs following discharge   - Consider OT consult to assist with ADL evaluation and planning for discharge  - Provide patient education as appropriate  Outcome: Progressing  Goal: Maintains/Returns to pre admission functional level  Description: INTERVENTIONS:  - Perform BMAT or MOVE assessment daily    - Set and communicate daily mobility goal to care team and patient/family/caregiver  - Collaborate with rehabilitation services on mobility goals if consulted  - Perform Range of Motion 3 times a day  - Reposition patient every 2 hours    - Dangle patient 3 times a day  - Stand patient 3 times a day  - Ambulate patient 3 times a day  - Out of bed to chair 3 times a day   - Out of bed for meals 3 times a day  - Out of bed for toileting  - Record patient progress and toleration of activity level   Outcome: Progressing     Problem: PAIN - ADULT  Goal: Verbalizes/displays adequate comfort level or baseline comfort level  Description: Interventions:  - Encourage patient to monitor pain and request assistance  - Assess pain using appropriate pain scale  - Administer analgesics based on type and severity of pain and evaluate response  - Implement non-pharmacological measures as appropriate and evaluate response  - Consider cultural and social influences on pain and pain management  - Notify physician/advanced practitioner if interventions unsuccessful or patient reports new pain  Outcome: Progressing     Problem: SAFETY ADULT  Goal: Maintain or return to baseline ADL function  Description: INTERVENTIONS:  -  Assess patient's ability to carry out ADLs; assess patient's baseline for ADL function and identify physical deficits which impact ability to perform ADLs (bathing, care of mouth/teeth, toileting, grooming, dressing, etc )  - Assess/evaluate cause of self-care deficits   - Assess range of motion  - Assess patient's mobility; develop plan if impaired  - Assess patient's need for assistive devices and provide as appropriate  - Encourage maximum independence but intervene and supervise when necessary  - Involve family in performance of ADLs  - Assess for home care needs following discharge   - Consider OT consult to assist with ADL evaluation and planning for discharge  - Provide patient education as appropriate  Outcome: Progressing  Goal: Maintains/Returns to pre admission functional level  Description: INTERVENTIONS:  - Perform BMAT or MOVE assessment daily    - Set and communicate daily mobility goal to care team and patient/family/caregiver  - Collaborate with rehabilitation services on mobility goals if consulted  - Perform Range of Motion 3 times a day  - Reposition patient every 2 hours    - Dangle patient 3 times a day  - Stand patient 3 times a day  - Ambulate patient 3 times a day  - Out of bed to chair 3 times a day   - Out of bed for meals 3 times a day  - Out of bed for toileting  - Record patient progress and toleration of activity level   Outcome: Progressing  Goal: Patient will remain free of falls  Description: INTERVENTIONS:  - Educate patient/family on patient safety including physical limitations  - Instruct patient to call for assistance with activity   - Consult OT/PT to assist with strengthening/mobility   - Keep Call bell within reach  - Keep bed low and locked with side rails adjusted as appropriate  - Keep care items and personal belongings within reach  - Initiate and maintain comfort rounds  - Make Fall Risk Sign visible to staff  - Offer Toileting every 2 Hours, in advance of need  - Initiate/Maintain bed alarm  - Apply yellow socks and bracelet for high fall risk patients  - Consider moving patient to room near nurses station  Outcome: Progressing     Problem: DISCHARGE PLANNING  Goal: Discharge to home or other facility with appropriate resources  Description: INTERVENTIONS:  - Identify barriers to discharge w/patient and caregiver  - Arrange for needed discharge resources and transportation as appropriate  - Identify discharge learning needs (meds, wound care, etc )  - Arrange for interpretive services to assist at discharge as needed  - Refer to Case Management Department for coordinating discharge planning if the patient needs post-hospital services based on physician/advanced practitioner order or complex needs related to functional status, cognitive ability, or social support system  Outcome: Progressing     Problem: HEMATOLOGIC - ADULT  Goal: Maintains hematologic stability  Description: INTERVENTIONS  - Assess for signs and symptoms of bleeding or hemorrhage  - Monitor labs  - Administer supportive blood products/factors as ordered and appropriate  Outcome: Progressing     Problem: MUSCULOSKELETAL - ADULT  Goal: Maintain or return mobility to safest level of function  Description: INTERVENTIONS:  - Assess patient's ability to carry out ADLs; assess patient's baseline for ADL function and identify physical deficits which impact ability to perform ADLs (bathing, care of mouth/teeth, toileting, grooming, dressing, etc )  - Assess/evaluate cause of self-care deficits   - Assess range of motion  - Assess patient's mobility  - Assess patient's need for assistive devices and provide as appropriate  - Encourage maximum independence but intervene and supervise when necessary  - Involve family in performance of ADLs  - Assess for home care needs following discharge   - Consider OT consult to assist with ADL evaluation and planning for discharge  - Provide patient education as appropriate  Outcome: Progressing  Goal: Maintain proper alignment of affected body part  Description: INTERVENTIONS:  - Support, maintain and protect limb and body alignment  - Provide patient/ family with appropriate education  Outcome: Progressing

## 2023-02-26 NOTE — ASSESSMENT & PLAN NOTE
· Asymptomatic hypo-osmolar hyponatremia  · I appreciate Nephrology's input    · Corrected too quickly due to requiring a total of 1000 ml of NSS IV fluids to treat hypotension  · Fluid restriction of 1000 ml/24 hours  · No sign of malignancy on CT scan of the chest/abdomen/pelvis with IV contrast completed on 02/25/2023  · Consult Endocrinology for an evaluation for possible adrenal insufficiency    Results from last 7 days   Lab Units 02/26/23  1341 02/26/23  0617 02/26/23  0115   SODIUM mmol/L 122* 123* 119*   POTASSIUM mmol/L 4 5 4 6 4 7   CHLORIDE mmol/L 92* 92* 90*   CO2 mmol/L 23 23 23   BUN mg/dL 26* 22 18   CREATININE mg/dL 0 86 0 92 0 83   CALCIUM mg/dL 9 3 9 3 9 1

## 2023-02-26 NOTE — PROGRESS NOTES
[address] [facility]  Progress Note - Fabián Smith 1965, 65 y o  male MRN: [id number]  Unit/Bed#: 738-74 Encounter: [id number]  Primary Care Provider: Jesus Menjivar DO   Date and time admitted to hospital: 2/25/2023  8:15 AM    * Hyponatremia  Assessment & Plan  · Asymptomatic hypo-osmolar hyponatremia  · I appreciate Nephrology's input    · Corrected too quickly due to requiring a total of 1000 ml of NSS IV fluids to treat hypotension  · Fluid restriction of 1000 ml/24 hours  · No sign of malignancy on CT scan of the chest/abdomen/pelvis with IV contrast completed on 02/25/2023  · Consult Endocrinology for an evaluation for possible adrenal insufficiency    Results from last 7 days   Lab Units 02/26/23  1341 02/26/23  0617 02/26/23  0115   SODIUM mmol/L 122* 123* 119*   POTASSIUM mmol/L 4 5 4 6 4 7   CHLORIDE mmol/L 92* 92* 90*   CO2 mmol/L 23 23 23   BUN mg/dL 26* 22 18   CREATININE mg/dL 0 86 0 92 0 83   CALCIUM mg/dL 9 3 9 3 9 1         Hypotension  Assessment & Plan  · SBP in the 80's-90's mmHg on 02/25/2023  · Required a total of 1000 ml of NSS IV fluids on 02/25/2023  · Initiated on midodrine 5 mg PO TID  · Consult Endocrinology for possible adrenal insufficiency     Latest Reference Range & Units 02/26/23 06:17   Cortisol - AM 4 2 - 22 4 ug/dL 9 0       Seizure disorder ([address] )  Assessment & Plan  · Recent elevated keppra level on 02/23/2023  · Will recheck to confirm its a true elevation  · Decrease keppra to 750 mg PO every 12 hours  · Outpatient follow-up with Neurology       Latest Reference Range & Units 02/23/23 09:17   LEVETIRACETA (KEPPRA) 10 0 - 40 0 ug/mL 44 1 (H)   (H): Data is abnormally high    Hypomagnesemia  Assessment & Plan  · Give magnesium sulfate 1 gram x 1 dose on 02/26/2023  · Follow the magnesium level    Results from last 7 days   Lab Units 02/26/23  0617   MAGNESIUM mg/dL 1 8*         Acute cystitis with hematuria  Assessment & Plan  · Probable acute cystitis  · Await the urine culture results  · Continue ceftriaxone 1000 mg IV every 24 hours (day #2)  • Outpatient Urology evaluation    Hypoglycemia  Assessment & Plan  · Possibly due to adrenal insufficiency  · Monitor the blood glucose trend  · Hypoglycemia protocol  · Consult Endocrinology     Latest Reference Range & Units 02/26/23 06:17   Cortisol - AM 4 2 - 22 4 ug/dL 9 0       History of alcohol abuse  Assessment & Plan  · The patient states that for the last 10 months he is only drink non-alcoholic beer, which he currently drinks 3 per day  · Give folic acid 1 mg IV Qdaily and thiamine 100 mg IV Qdaily  · Daily PO multivitamin  · CIWA protocol    Pancreatic pseudocyst  Assessment & Plan  · Check a CA 19-9 level  · Outpatient follow-up and surveillance imaging with Gastroenterology    CT scan of the chest/abdomen/pelvis (02/25/2023):  PANCREAS:  Large pancreatic head pseudocyst measuring 6 4 x 6 5 cm, stable allowing for differences in measurement technique  Some peripheral calcifications noted as before  Continued prominence of the main pancreatic duct may reflect partial obstruction from the pseudocyst itself  Again correlate with lipase  Chronic pancreatic pseudocyst, stable  Tobacco use  Assessment & Plan  · Smoked 3-4 ppd of cigarettes for many years  · Currently smokes 1 ppd of cigarettes  · Nicotine patch  · Smoking cessation counseling      VTE Pharmacologic Prophylaxis: VTE Score: 3 Moderate Risk (Score 3-4) - Pharmacological DVT Prophylaxis Ordered: enoxaparin (Lovenox)  Patient Centered Rounds: I performed bedside rounds with nursing staff today  Discussions with Specialists or Other Care Team Provider: I discussed the case with Nephrology      Total Time Spent on Date of Encounter in care of patient: 35 minutes This time was spent on one or more of the following: performing physical exam; counseling and coordination of care; obtaining or reviewing history; documenting in the medical record; reviewing/ordering tests, medications or procedures; communicating with other healthcare professionals and discussing with patient's family/caregivers  Current Length of Stay: 1 day(s)  Current Patient Status: Inpatient   Certification Statement: The patient will continue to require additional inpatient hospital stay due to the need for IV antibiotic treatment, for treatment of the hypotension, and for additional work-up and serial laboratory testing for the hyponatremia  Discharge Plan: Anticipate discharge in 24-48 hrs to home  Code Status: Level 1 - Full Code    Subjective: The patient was seen and examined  The patient is doing well without any specific complaints  No chest pain  No shortness of breath  No abdominal pain  No nausea or vomiting  Objective:     Vitals:   Temp (24hrs), Av 8 °F (36 6 °C), Min:97 8 °F (36 6 °C), Max:97 8 °F (36 6 °C)    Temp:  [97 8 °F (36 6 °C)] 97 8 °F (36 6 °C)  HR:  [83-99] 99  Resp:  [17-18] 18  BP: ()/(62-69) 105/62  SpO2:  [93 %-97 %] 93 %  Body mass index is 19 41 kg/m²  Input and Output Summary (last 24 hours):      Intake/Output Summary (Last 24 hours) at 2023 1557  Last data filed at 2023 1555  Gross per 24 hour   Intake 540 ml   Output 1750 ml   Net -1210 ml       Physical Exam:   Physical Exam   General:  NAD, follows commands  HEENT:  NC/AT, mucous membranes moist  Neck:  Supple, No JVP elevation  CV:  + S1, + S2, RRR  Pulm:  Lung fields are CTA bilaterally  Abd:  Soft, Non-tender, Non-distended  Ext:  No clubbing/cyanosis/edema  Skin:  No rashes  Neuro:  Awake, alert, oriented  Psych:  Normal mood and affect      Additional Data:    Labs:  Results from last 7 days   Lab Units 23  0617   WBC Thousand/uL 6 99   HEMOGLOBIN g/dL 13 0   HEMATOCRIT % 37 3   PLATELETS Thousands/uL 220   NEUTROS PCT % 60   LYMPHS PCT % 24   MONOS PCT % 14*   EOS PCT % 1     Results from last 7 days   Lab Units 23  1341 02/26/23  0617   SODIUM mmol/L 122* 123*   POTASSIUM mmol/L 4 5 4 6   CHLORIDE mmol/L 92* 92*   CO2 mmol/L 23 23   BUN mg/dL 26* 22   CREATININE mg/dL 0 86 0 92   ANION GAP mmol/L 7 8   CALCIUM mg/dL 9 3 9 3   ALBUMIN g/dL  --  3 8   TOTAL BILIRUBIN mg/dL  --  0 48   ALK PHOS U/L  --  88   ALT U/L  --  8   AST U/L  --  19   GLUCOSE RANDOM mg/dL 103 80             Results from last 7 days   Lab Units 02/26/23  0617   HEMOGLOBIN A1C % 5 0     Results from last 7 days   Lab Units 02/26/23  0617   PROCALCITONIN ng/ml 0 05       Lines/Drains:  Invasive Devices     Peripheral Intravenous Line  Duration           Peripheral IV 02/25/23 Right;Ventral (anterior) Forearm 1 day                      Imaging: No pertinent imaging reviewed      Recent Cultures (last 7 days):         Last 24 Hours Medication List:   Current Facility-Administered Medications   Medication Dose Route Frequency Provider Last Rate   • cefTRIAXone  1,000 mg Intravenous Q24H Erick Keep [place], DO 1,000 mg (02/26/23 1345)   • cholecalciferol  1,000 Units Oral Daily Erick Keep [place], DO     • cyanocobalamin  100 mcg Oral Daily Erick Keep Brad, DO     • Diclofenac Sodium  2 g Topical 4x Daily PRN Erick Keep Brad, DO     • diphenhydrAMINE  25 mg Oral Q6H PRN Erick Keep [place], DO     • docusate sodium  100 mg Oral BID PRN Erick Keep Brad, DO     • enoxaparin  40 mg Subcutaneous Q24H Erick Keep Brad, DO     • folic acid 1 mg, thiamine 100 mg in 0 9% sodium chloride 100 mL IVPB   Intravenous Daily Erick Keep Brad, DO     • levETIRAcetam  750 mg Oral Q12H [facility] Erick Keep Brad, DO     • LORazepam  0 5 mg Oral HS Erick Keep Brad, DO     • midodrine  5 mg Oral TID AC Erick Keep Brad, DO     • multivitamin-minerals  1 tablet Oral Daily Erick Keep [place], DO     • nicotine  14 mg Transdermal Daily Erick Keep [place], DO     • ondansetron  4 mg Intravenous Q6H PRN Mickie Cuevas DO          Today, Patient Was Seen By: Jolie Garcia, DO    **Please Note: This note may have been constructed using a voice recognition system  **

## 2023-02-26 NOTE — ASSESSMENT & PLAN NOTE
· Probable acute cystitis  · Await the urine culture results  · Continue ceftriaxone 1000 mg IV every 24 hours (day #2)  • Outpatient Urology evaluation

## 2023-02-26 NOTE — ASSESSMENT & PLAN NOTE
· The patient states that for the last 10 months he is only drink non-alcoholic beer, which he currently drinks 3 per day    · Give folic acid 1 mg IV Qdaily and thiamine 100 mg IV Qdaily  · Daily PO multivitamin  · CIWA protocol

## 2023-02-27 ENCOUNTER — APPOINTMENT (OUTPATIENT)
Dept: PHYSICAL THERAPY | Facility: CLINIC | Age: 58
End: 2023-02-27

## 2023-02-27 LAB
ALBUMIN SERPL BCP-MCNC: 3.8 G/DL (ref 3.5–5)
ALP SERPL-CCNC: 108 U/L (ref 34–104)
ALT SERPL W P-5'-P-CCNC: 8 U/L (ref 7–52)
ANION GAP SERPL CALCULATED.3IONS-SCNC: 5 MMOL/L (ref 4–13)
ANION GAP SERPL CALCULATED.3IONS-SCNC: 5 MMOL/L (ref 4–13)
ANION GAP SERPL CALCULATED.3IONS-SCNC: 6 MMOL/L (ref 4–13)
ANION GAP SERPL CALCULATED.3IONS-SCNC: 7 MMOL/L (ref 4–13)
AST SERPL W P-5'-P-CCNC: 19 U/L (ref 13–39)
BACTERIA UR CULT: ABNORMAL
BASOPHILS # BLD AUTO: 0.04 THOUSANDS/ÂΜL (ref 0–0.1)
BASOPHILS NFR BLD AUTO: 1 % (ref 0–1)
BILIRUB DIRECT SERPL-MCNC: 0.12 MG/DL (ref 0–0.2)
BILIRUB SERPL-MCNC: 0.35 MG/DL (ref 0.2–1)
BUN SERPL-MCNC: 18 MG/DL (ref 5–25)
BUN SERPL-MCNC: 19 MG/DL (ref 5–25)
BUN SERPL-MCNC: 22 MG/DL (ref 5–25)
BUN SERPL-MCNC: 26 MG/DL (ref 5–25)
CALCIUM SERPL-MCNC: 8.5 MG/DL (ref 8.4–10.2)
CALCIUM SERPL-MCNC: 8.8 MG/DL (ref 8.4–10.2)
CALCIUM SERPL-MCNC: 8.9 MG/DL (ref 8.4–10.2)
CALCIUM SERPL-MCNC: 9.1 MG/DL (ref 8.4–10.2)
CHLORIDE SERPL-SCNC: 93 MMOL/L (ref 96–108)
CHLORIDE SERPL-SCNC: 93 MMOL/L (ref 96–108)
CHLORIDE SERPL-SCNC: 94 MMOL/L (ref 96–108)
CHLORIDE SERPL-SCNC: 94 MMOL/L (ref 96–108)
CO2 SERPL-SCNC: 23 MMOL/L (ref 21–32)
CO2 SERPL-SCNC: 23 MMOL/L (ref 21–32)
CO2 SERPL-SCNC: 24 MMOL/L (ref 21–32)
CO2 SERPL-SCNC: 25 MMOL/L (ref 21–32)
CREAT SERPL-MCNC: 0.82 MG/DL (ref 0.6–1.3)
CREAT SERPL-MCNC: 0.89 MG/DL (ref 0.6–1.3)
CREAT SERPL-MCNC: 0.94 MG/DL (ref 0.6–1.3)
CREAT SERPL-MCNC: 0.95 MG/DL (ref 0.6–1.3)
EOSINOPHIL # BLD AUTO: 0.06 THOUSAND/ÂΜL (ref 0–0.61)
EOSINOPHIL NFR BLD AUTO: 1 % (ref 0–6)
ERYTHROCYTE [DISTWIDTH] IN BLOOD BY AUTOMATED COUNT: 13.3 % (ref 11.6–15.1)
GFR SERPL CREATININE-BSD FRML MDRD: 89 ML/MIN/1.73SQ M
GFR SERPL CREATININE-BSD FRML MDRD: 90 ML/MIN/1.73SQ M
GFR SERPL CREATININE-BSD FRML MDRD: 95 ML/MIN/1.73SQ M
GFR SERPL CREATININE-BSD FRML MDRD: 98 ML/MIN/1.73SQ M
GLUCOSE SERPL-MCNC: 106 MG/DL (ref 65–140)
GLUCOSE SERPL-MCNC: 110 MG/DL (ref 65–140)
GLUCOSE SERPL-MCNC: 85 MG/DL (ref 65–140)
GLUCOSE SERPL-MCNC: 91 MG/DL (ref 65–140)
HCT VFR BLD AUTO: 34.6 % (ref 36.5–49.3)
HGB BLD-MCNC: 12.1 G/DL (ref 12–17)
IMM GRANULOCYTES # BLD AUTO: 0.03 THOUSAND/UL (ref 0–0.2)
IMM GRANULOCYTES NFR BLD AUTO: 1 % (ref 0–2)
LYMPHOCYTES # BLD AUTO: 1.51 THOUSANDS/ÂΜL (ref 0.6–4.47)
LYMPHOCYTES NFR BLD AUTO: 25 % (ref 14–44)
MAGNESIUM SERPL-MCNC: 1.8 MG/DL (ref 1.9–2.7)
MCH RBC QN AUTO: 35 PG (ref 26.8–34.3)
MCHC RBC AUTO-ENTMCNC: 35 G/DL (ref 31.4–37.4)
MCV RBC AUTO: 100 FL (ref 82–98)
MONOCYTES # BLD AUTO: 0.77 THOUSAND/ÂΜL (ref 0.17–1.22)
MONOCYTES NFR BLD AUTO: 13 % (ref 4–12)
NEUTROPHILS # BLD AUTO: 3.57 THOUSANDS/ÂΜL (ref 1.85–7.62)
NEUTS SEG NFR BLD AUTO: 59 % (ref 43–75)
NRBC BLD AUTO-RTO: 0 /100 WBCS
PHOSPHATE SERPL-MCNC: 3 MG/DL (ref 2.7–4.5)
PLATELET # BLD AUTO: 182 THOUSANDS/UL (ref 149–390)
PMV BLD AUTO: 9 FL (ref 8.9–12.7)
POTASSIUM SERPL-SCNC: 4.2 MMOL/L (ref 3.5–5.3)
POTASSIUM SERPL-SCNC: 4.2 MMOL/L (ref 3.5–5.3)
POTASSIUM SERPL-SCNC: 4.4 MMOL/L (ref 3.5–5.3)
POTASSIUM SERPL-SCNC: 4.4 MMOL/L (ref 3.5–5.3)
PROCALCITONIN SERPL-MCNC: 0.06 NG/ML
PROT SERPL-MCNC: 7.1 G/DL (ref 6.4–8.4)
RBC # BLD AUTO: 3.46 MILLION/UL (ref 3.88–5.62)
SODIUM SERPL-SCNC: 122 MMOL/L (ref 135–147)
SODIUM SERPL-SCNC: 123 MMOL/L (ref 135–147)
SODIUM SERPL-SCNC: 123 MMOL/L (ref 135–147)
SODIUM SERPL-SCNC: 124 MMOL/L (ref 135–147)
WBC # BLD AUTO: 5.98 THOUSAND/UL (ref 4.31–10.16)

## 2023-02-27 RX ORDER — COSYNTROPIN 0.25 MG/ML
0.25 INJECTION, POWDER, FOR SOLUTION INTRAMUSCULAR; INTRAVENOUS ONCE
Status: COMPLETED | OUTPATIENT
Start: 2023-02-28 | End: 2023-02-28

## 2023-02-27 RX ORDER — SODIUM CHLORIDE 9 MG/ML
75 INJECTION, SOLUTION INTRAVENOUS CONTINUOUS
Status: DISCONTINUED | OUTPATIENT
Start: 2023-02-27 | End: 2023-03-01 | Stop reason: HOSPADM

## 2023-02-27 RX ADMIN — DIPHENHYDRAMINE HYDROCHLORIDE 25 MG: 25 TABLET ORAL at 05:20

## 2023-02-27 RX ADMIN — MIDODRINE HYDROCHLORIDE 5 MG: 5 TABLET ORAL at 06:03

## 2023-02-27 RX ADMIN — CHOLECALCIFEROL TAB 25 MCG (1000 UNIT) 1000 UNITS: 25 TAB at 08:33

## 2023-02-27 RX ADMIN — MIDODRINE HYDROCHLORIDE 5 MG: 5 TABLET ORAL at 11:17

## 2023-02-27 RX ADMIN — MIDODRINE HYDROCHLORIDE 5 MG: 5 TABLET ORAL at 17:46

## 2023-02-27 RX ADMIN — Medication 100 MCG: at 08:33

## 2023-02-27 RX ADMIN — MULTIPLE VITAMINS W/ MINERALS TAB 1 TABLET: TAB at 08:34

## 2023-02-27 RX ADMIN — DIPHENHYDRAMINE HYDROCHLORIDE 25 MG: 25 TABLET ORAL at 21:25

## 2023-02-27 RX ADMIN — ENOXAPARIN SODIUM 40 MG: 40 INJECTION SUBCUTANEOUS at 14:14

## 2023-02-27 RX ADMIN — SODIUM CHLORIDE 75 ML/HR: 0.9 INJECTION, SOLUTION INTRAVENOUS at 14:09

## 2023-02-27 RX ADMIN — DIPHENHYDRAMINE HYDROCHLORIDE 25 MG: 25 TABLET ORAL at 11:21

## 2023-02-27 RX ADMIN — THIAMINE HYDROCHLORIDE: 100 INJECTION, SOLUTION INTRAMUSCULAR; INTRAVENOUS at 11:17

## 2023-02-27 RX ADMIN — LEVETIRACETAM 750 MG: 250 TABLET, FILM COATED ORAL at 21:23

## 2023-02-27 RX ADMIN — LEVETIRACETAM 750 MG: 250 TABLET, FILM COATED ORAL at 08:33

## 2023-02-27 RX ADMIN — CEFTRIAXONE 1000 MG: 1 INJECTION, SOLUTION INTRAVENOUS at 14:14

## 2023-02-27 RX ADMIN — LORAZEPAM 0.5 MG: 0.5 TABLET ORAL at 21:23

## 2023-02-27 NOTE — ASSESSMENT & PLAN NOTE
· >100K colonies GNR  · Await the urine culture results  · Continue ceftriaxone 1000 mg IV every 24 hours (day #3)  • Outpatient Urology evaluation

## 2023-02-27 NOTE — UTILIZATION REVIEW
Initial Clinical Review    Admission: Date/Time/Statement:   Admission Orders (From admission, onward)     Ordered        02/25/23 0921  Inpatient Admission  Once                      Orders Placed This Encounter   Procedures   • Inpatient Admission     Standing Status:   Standing     Number of Occurrences:   1     Order Specific Question:   Level of Care     Answer:   Med Surg [16]     Order Specific Question:   Estimated length of stay     Answer:   More than 2 Midnights     Order Specific Question:   Certification     Answer:   I certify that inpatient services are medically necessary for this patient for a duration of greater than two midnights  See H&P and MD Progress Notes for additional information about the patient's course of treatment  ED Arrival Information     Expected   -    Arrival   2/25/2023 08:01    Acuity   Urgent            Means of arrival   Walk-In    Escorted by   [facility]    Admission type   Emergency            Arrival complaint   Abnormal Lab           Chief Complaint   Patient presents with   • Abnormal Lab     Pt sent here by PCP for low sodium level, states it was 120  Initial Presentation: 65 y o  male  Presents to ed from home for evaluation and treatment of hyponatremia 121 on outpatient blood cork  Patient reports compliance with medication  Takes 3 G NACL, BUMEX 1-2/DAY  PMHX: COPD, seizures, alcoholism, bowel perforation  SODIUM 112, RANDOM GLUCOSE 62  EKG with non specific T wave abnormality and shortened QT  Initially treated with fluid restriction 6 hours and repeat BMP  Iv dextrose 50%  Admit to inpatient med surg for hyponatremia  Plan nephrology consult, iv  9% ns bolus  Nephrology consulted hyposmolar hyponatremia, 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     Hyponatremia will not likely not correct past mid to high 120s  Endocrine consult recommended  Concern for adrenal insufficiency    Goal serum sodium 125 to 129 in cirrhotic  Increase fluid restriction to 1 5L daily  Continue serial monitoring BMP  UrIne culture positive for gram neg becka  Start iv ceftriaxone      Date: 2-25-23     Inpatient med surg   Continue iv ceftriaxone for UTI with hematuria  Hypo-osmolar hyponatremia  Patient received iv NS to treat hypotension 86/59  Sodium 123  Continue fluid restriction  Give iv folic acid and iv thiamine  Ciwa = 0  Date: 2-26-23 inpatient med surg  Continue to  Urinary tract infection with iv antibiotic  Monitor blood pressure for hypotension  Follow up BMP with focus on hyponatremia  Date:   2-27-23   Inpatient med surg    Endocrinology consulted  Plan to test for adrenal insufficiency  Sodium remains low  Will proceed with cosyntropin stimulation test  Would suggest preparing the protocol for 2-28-23  For cosyntropin test, baseline labs for ACTH and cortisol should be obtained prior to administration with cosyntropin 250 mcg  Labs should be obtained for cortisol at 30- and 60-minutes post cosyntropin administration  The test is over after the 60 minute collection  Will follow up based on results  Now on po  midodrine for hypotension   Continue iv ceftriaxone for UTI  Continue iv folic acid, iv  9% ns 58/BP         ED Triage Vitals [02/25/23 0808]   98 1 °F (36 7 °C) 75 16 114/74 98 %      Temporal Monitor         No Pain          02/27/23 51 7 kg (113 lb 15 7 oz)     Additional Vital Signs:     Date/Time Temp Pulse Resp BP MAP (mmHg) SpO2 O2 Device   02/27/23 0833 -- -- -- -- -- -- None (Room air)   02/27/23 08:09:48 97 7 °F (36 5 °C) 98 16 111/71 84 97 % --   02/27/23 04:15:27 -- 80 -- 113/73 86 97 % --   02/27/23 00:02:31 -- 88 -- 99/67 78 95 % --   02/26/23 21:49:27 98 2 °F (36 8 °C) 94 15 99/65 76 96 % --   02/26/23 20:17:45 -- 95 -- 103/68 80 92 % --   02/26/23 2000 -- -- -- -- -- -- None (Room air)   02/26/23 14:38:32 97 8 °F (36 6 °C) 99 18 105/62 76 93 % --   02/26/23 2625 -- -- -- -- -- -- None (Room air)   02/26/23 00:45:11 -- 83 17 97/69 78 95 % --   02/25/23 2100 -- -- -- -- -- 93 % None (Room air)   02/25/23 1845 -- 90 -- 91/64 73 95 % --   02/25/23 1815 -- 90 -- 87/62   Abnormal  -- -- --   02/25/23 1745 -- 89 -- 92/64 -- -- --   02/25/23 1715 -- 87 -- 89/62   Abnormal  71 97 % None (Room air)   02/25/23 1519 -- -- -- 92/58 -- -- --   02/25/23 15:13:39 98 4 °F (36 9 °C) 81 16 86/59   Abnormal  68 96 % None (Room air)   02/25/23 1003 -- -- -- -- -- -- None (Room air)   02/25/23 09:47:26 97 8 °F (36 6 °C) 87 -- 104/73 83 100 % --   02/25/23 0930 -- 77 16 98/72 80 98 % --   02/25/23 0808 98 1 °F (36 7 °C) 75 16 114/74 89 98 % None (Room air)             Pertinent Labs/Diagnostic Test Results:     1010 EKG interpreted by myself  EKG dated 2/25/2023 at 0935 demonstrates normal sinus rhythm 85 bpm, normal NJ interval, normal QRS interval with left axis deviation, no STEMI, normal QTc interval         CT chest abdomen pelvis w contrast   Final  (02/25 1211)   1  No suspected malignancy throughout the chest, abdomen or pelvis  2   Chronic pancreatic pseudocyst, stable  3   Old fractures throughout the chest, abdomen and pelvis          Results from last 7 days   Lab Units 02/25/23  1025   SARS-COV-2  Negative     Results from last 7 days   Lab Units 02/27/23  0512 02/26/23  0617 02/25/23  0830   WBC Thousand/uL 5 98 6 99 9 86   HEMOGLOBIN g/dL 12 1 13 0 14 1   HEMATOCRIT % 34 6* 37 3 38 4   PLATELETS Thousands/uL 182 220 247   NEUTROS ABS Thousands/µL 3 57 4 19 6 82         Results from last 7 days   Lab Units 02/27/23  0512 02/27/23  0007 02/26/23  1808 02/26/23  1341 02/26/23  0617   SODIUM mmol/L 123* 123* 123* 122* 123*   POTASSIUM mmol/L 4 2 4 2 3 9 4 5 4 6   CHLORIDE mmol/L 93* 94* 92* 92* 92*   CO2 mmol/L 25 23 23 23 23   ANION GAP mmol/L 5 6 8 7 8   BUN mg/dL 22 26* 26* 26* 22   CREATININE mg/dL 0 82 0 95 0 90 0 86 0 92   EGFR ml/min/1 73sq m 98 89 95 96 92   CALCIUM mg/dL 8 8 8 9 8 8 9 3 9 3   MAGNESIUM mg/dL 1 8*  --   --   --  1 8*   PHOSPHORUS mg/dL 3 0  --   --   --  2 9     Results from last 7 days   Lab Units 02/27/23  0512 02/26/23  0617 02/25/23  0830   AST U/L 19 19 22   ALT U/L 8 8 9   ALK PHOS U/L 108* 88 96   TOTAL PROTEIN g/dL 7 1 7 1 7 9   ALBUMIN g/dL 3 8 3 8 4 4   TOTAL BILIRUBIN mg/dL 0 35 0 48 0 78   BILIRUBIN DIRECT mg/dL 0 12 0 13  --          Results from last 7 days   Lab Units 02/27/23  0512 02/27/23  0007 02/26/23  1808 02/26/23  1341 02/26/23  0617 02/26/23  0115 02/25/23  1814 02/25/23  0830   GLUCOSE RANDOM mg/dL 85 91 129 103 80 95 117 62*     Results from last 7 days   Lab Units 02/25/23  0830   OSMOLALITY, SERUM mmol/*     Results from last 7 days   Lab Units 02/26/23  0617   HEMOGLOBIN A1C % 5 0   EAG mg/dl 97         Results from last 7 days   Lab Units 02/26/23  0617   TSH 3RD GENERATON uIU/mL 3 028     Results from last 7 days   Lab Units 02/27/23  0512 02/26/23  0617   PROCALCITONIN ng/ml 0 06 0 05       Results from last 7 days   Lab Units 02/26/23  0617   HEP B S AG  Non-reactive   HEP C AB  Non-reactive   HEP B C IGM  Non-reactive     Results from last 7 days   Lab Units 02/26/23  0617   LIPASE u/L 332*         Results from last 7 days   Lab Units 02/25/23  1111 02/25/23  0830   OSMOLALITY, SERUM mmol/KG  --  239*   OSMO UR mmol/*  --      Results from last 7 days   Lab Units 02/25/23  1112 02/25/23  1111   CLARITY UA  Slightly Cloudy  --    COLOR UA  Light Yellow  --    SPEC GRAV UA  <=1 005  --    PH UA  7 0  --    GLUCOSE UA mg/dl 100 (1/10%)*  --    KETONES UA mg/dl Negative  --    BLOOD UA  Trace-Intact*  --    PROTEIN UA mg/dl Negative  --    NITRITE UA  Negative  --    BILIRUBIN UA  Negative  --    UROBILINOGEN UA E U /dl 0 2  --    LEUKOCYTES UA  Large*  --    WBC UA /hpf 30-50*  --    RBC UA /hpf 1-2  --    BACTERIA UA /hpf Innumerable*  --    EPITHELIAL CELLS WET PREP /hpf Occasional  --    SODIUM UR   --  24   CREATININE UR mg/dL  -- 24 1     Results from last 7 days   Lab Units 02/25/23  1025   INFLUENZA A PCR  Negative   INFLUENZA B PCR  Negative   RSV PCR  Negative         Results from last 7 days   Lab Units 02/25/23  1111   AMPH/METH  Negative   BARBITURATE UR  Negative   BENZODIAZEPINE UR  Negative   COCAINE UR  Negative   METHADONE URINE  Negative   OPIATE UR  Negative   PCP UR  Negative   THC UR  Negative         Results from last 7 days   Lab Units 02/25/23  1112   URINE CULTURE  >100,000 cfu/ml Gram Negative Juan*               ED Treatment:   Medication Administration from 02/25/2023 0801 to 02/25/2023 8135       Date/Time Order Dose Route Action     02/25/2023 0905 EST dextrose 50 % IV solution 25 mL 25 mL Intravenous Given        Past Medical History:   Diagnosis Date   • Alcohol abuse    • Traore esophagus    • Bowel obstruction (HCC)    • Bowel perforation (HCC)    • Cardiac disease    • Continuous chronic alcoholism ([facility] [address] ) 10/5/2017   • COPD (chronic obstructive pulmonary disease) (HCC)    • History of shoulder surgery     Right shoulder   • History of transfusion    • Hx of cervical spine surgery    • Hypertension    • Incisional hernia 10/8/2017   • MI, old    • Mitral regurgitation    • Psychiatric disorder    • Seizures ([facility] [address] )      Present on Admission:  • Seizure disorder ([facility] [address] )  • Pancreatic pseudocyst  • Hypomagnesemia      Admitting Diagnosis:     Hyponatremia [E87 1]  Hypoglycemia [E16 2]  Abnormal laboratory test result [R89 9]    Age/Sex: 65 y o  male    Scheduled Medications:    cefTRIAXone, 1,000 mg, Intravenous, Q24H  cholecalciferol, 1,000 Units, Oral, Daily  cyanocobalamin, 100 mcg, Oral, Daily  enoxaparin, 40 mg, Subcutaneous, Z01J  folic acid 1 mg, thiamine 100 mg in 0 9% sodium chloride 100 mL IVPB, , Intravenous, Daily  levETIRAcetam, 750 mg, Oral, Q12H KIMBERLY  LORazepam, 0 5 mg, Oral, HS  midodrine, 5 mg, Oral, TID AC  multivitamin-minerals, 1 tablet, Oral, Daily  nicotine, 14 mg, Transdermal, Daily      Continuous IV Infusions:     PRN Meds:  Diclofenac Sodium, 2 g, Topical, 4x Daily PRN  diphenhydrAMINE, 25 mg, Oral, Q6H PRN  docusate sodium, 100 mg, Oral, BID PRN  ondansetron, 4 mg, Intravenous, Q6H PRN        IP CONSULT TO NEPHROLOGY  IP CONSULT TO ENDOCRINOLOGY    Network Utilization Review Department  ATTENTION: Please call with any questions or concerns to [phone] and carefully listen to the prompts so that you are directed to the right person  All voicemails are confidential   Pattie Shultz all requests for admission clinical reviews, approved or denied determinations and any other requests to dedicated fax number below belonging to the campus where the patient is receiving treatment   List of dedicated fax numbers for the Facilities:  [address] DENIALS (Administrative/Medical Necessity) [phone]   [address] (Maternity/NICU/Pediatrics) [phone]   913 Cecily Sanchez [phone]   [facility] [phone]   [address] [place] [id number] [place] [address] 28 [phone]   [address] [facility] [place] [facility] [address] [phone]

## 2023-02-27 NOTE — ASSESSMENT & PLAN NOTE
· The patient reported that in the last 10 months he only drinks non-alcoholic beer, which he currently drinks 3 per day    · Give folic acid 1 mg IV Qdaily and thiamine 100 mg IV Qdaily  · Daily PO multivitamin  · CIWA protocol  · No evidence of withdrawal

## 2023-02-27 NOTE — PROGRESS NOTES
Progress Note - Nephrology   Porsha Glynn 65 y o  male MRN: [id number]  Unit/Bed#: 535-04 Encounter: [id number]    Assessment and Plan    1  Acute on chronic moderate asymptomatic hypoosmolar hyponatremia  Endocrinology consultation pending for evaluation of possible adrenal insufficiency  No further improvement  Suspect hypovolemic given urine studies and patient examines euvolemic to hypovolemic  We will cautiously trial normal saline with BMP every 4 hours  2  Hypotension  On midodrine  3  Cystitis without hematuria  Ceftriaxone per hospitalist colleagues  4  Alcoholic cirrhosis of liver without ascites  5  Hypertension, essential   Now hypotensive on midodrine  6  Seizure disorder  Management per hospitalist colleagues  Monitor with hyponatremia      Follow up reason for today's visit:     Hyponatremia    Patient Active Problem List   Diagnosis   • Tobacco abuse   • Essential hypertension   • H/O suicide attempt   • H/O cervical spine surgery   • Chronic hyponatremia   • Dietary folate deficiency anemia   • Ventral hernia without obstruction or gangrene   • Hepatomegaly   • Hepatic steatosis   • Hypomagnesemia   • Elevated alkaline phosphatase level   • Alcoholic hepatitis without ascites   • Vitamin D deficiency   • Iron deficiency   • Urinary retention   • Bladder wall thickening   • Hypophosphatemia   • Generalized weakness   • Malnutrition of moderate degree (HCC)   • Hyponatremia   • Hypokalemia   • Continuous chronic alcoholism (HCC)   • Incisional hernia   • Hx of seizure disorder   • Seizure-like activity (HCC)   • Diarrhea   • Abnormality of pancreatic duct   • Allergic rhinitis   • Microcytic anemia   • Abdominal wound dehiscence   • Cervical disc disorder with myelopathy   • Cervical spinal stenosis   • Depression   • Left foot drop   • Lumbar radiculopathy   • Neuropathy involving both lower extremities   • Peripheral neuropathy   • T6 vertebral fracture (HCC)   • Alcoholic cirrhosis of liver without ascites (HCC)   • Thrombocytopenia (HCC)   • Closed fracture of left olecranon process, initial encounter   • Iron deficiency anemia due to chronic blood loss   • Duodenal ulcer   • Tubular adenoma   • Traore esophagus   • Celiac artery stenosis (HCC)   • Pancreatic mass   • Pancytopenia (HCC)   • Tobacco use   • Constipation   • Transaminitis   • Lesion of spleen   • Left anterior fascicular block   • Abnormal EKG   • Acute on chronic pancreatitis [facility])   • Pancreatic pseudocyst   • COPD (chronic obstructive pulmonary disease) (HCC)   • Ileus (HCC)   • Ambulatory dysfunction   • Current every day smoker   • Fall   • Hx of duodenal ulcer   • Neck pain   • Alcohol use   • Thoracic compression fracture (HCC)   • Aortic ectasia (HCC)   • Rib fractures   • Seizure disorder (HCC)   • Hypoxia   • Traore's esophagus   • Elevated d-dimer   • COVID-19 virus infection   • Hypocalcemia   • Clavicular fracture   • Low serum cortisol level   • Chronic anemia   • Osteoporosis with current pathological fracture   • Abnormal CXR   • Moderate protein-calorie malnutrition (HCC)   • Esophageal abnormality   • Insomnia   • History of alcohol abuse   • Hypoglycemia   • Acute cystitis with hematuria   • Hypotension         Subjective:   Long conversation with patient in which he believes that he is volume depleted and would like to trial normal saline  Does not want to be placed back on sodium chloride tablets or diuretics  Denies poor p o  intake and GI losses  Is on fluid restriction  A complete 10 point review of systems was performed and is otherwise negative  Objective:     Vitals: Blood pressure 109/72, pulse 72, temperature 97 7 °F (36 5 °C), resp  rate 16, height 5' 4" (1 626 m), weight 51 7 kg (113 lb 15 7 oz), SpO2 98 %  ,Body mass index is 19 56 kg/m²      Weight (last 2 days)     Date/Time Weight    02/27/23 0539 51 7 (113 98)    02/25/23 1317 51 3 (113 1)    02/25/23 0957 51 3 (113 2) Intake/Output Summary (Last 24 hours) at 2/27/2023 1200  Last data filed at 2/27/2023 0910  Gross per 24 hour   Intake 880 ml   Output 725 ml   Net 155 ml     I/O last 3 completed shifts: In: 760 [P O :760]  Out: 925 [Urine:925]         Physical Exam: /72   Pulse 72   Temp 97 7 °F (36 5 °C)   Resp 16   Ht 5' 4" (1 626 m)   Wt 51 7 kg (113 lb 15 7 oz)   SpO2 98%   BMI 19 56 kg/m²     General Appearance:    No acute distress  Cooperative  Appears stated age  Head:    Normocephalic  Atraumatic  Normal jaw occlusion  Eyes:    Lids, conjunctiva normal  No scleral icterus  Ears:    Normal external ears  Nose:   Nares normal  No drainage  Mouth:   Lips, tongue normal  Mucosa normal  Phonation normal    Neck:   Supple  Symmetrical    Back:     Symmetric  No CVA tenderness  Lungs:     Normal respiratory effort  Clear to auscultation bilaterally  Chest wall:    No tenderness or deformity  Heart:    Regular rate and rhythm  Normal S1 and S2  No murmur  No JVD  No edema  Abdomen:     Soft  Non-tender  Bowel sounds active  Genitourinary:   No Tabares catheter present  Extremities:   Extremities normal  Atraumatic  No cyanosis  Skin:   Warm and dry  No pallor, jaundice, rash, ecchymoses  Neurologic:   Alert and oriented to person, place, time  No focal deficit  Lab, Imaging and other studies: I have personally reviewed pertinent labs    CBC:   Lab Results   Component Value Date    WBC 5 98 02/27/2023    HGB 12 1 02/27/2023    HCT 34 6 (L) 02/27/2023     (H) 02/27/2023     02/27/2023    MCH 35 0 (H) 02/27/2023    MCHC 35 0 02/27/2023    RDW 13 3 02/27/2023    MPV 9 0 02/27/2023    NRBC 0 02/27/2023     CMP:   Lab Results   Component Value Date    K 4 2 02/27/2023    CL 93 (L) 02/27/2023    CO2 25 02/27/2023    BUN 22 02/27/2023    CREATININE 0 82 02/27/2023    CALCIUM 8 8 02/27/2023    AST 19 02/27/2023    ALT 8 02/27/2023    ALKPHOS 108 (H) 02/27/2023    EGFR 98 02/27/2023         Results from last 7 days   Lab Units 02/27/23  0512 02/27/23  0007 02/26/23  1808 02/26/23  1341 02/26/23  0617 02/25/23  1814 02/25/23  0830   POTASSIUM mmol/L 4 2 4 2 3 9   < > 4 6   < > 4 6   CHLORIDE mmol/L 93* 94* 92*   < > 92*   < > 78*   CO2 mmol/L 25 23 23   < > 23   < > 27   BUN mg/dL 22 26* 26*   < > 22   < > 14   CREATININE mg/dL 0 82 0 95 0 90   < > 0 92   < > 0 83   CALCIUM mg/dL 8 8 8 9 8 8   < > 9 3   < > 9 5   ALK PHOS U/L 108*  --   --   --  88  --  96   ALT U/L 8  --   --   --  8  --  9   AST U/L 19  --   --   --  19  --  22    < > = values in this interval not displayed  Phosphorus:   Lab Results   Component Value Date    PHOS 3 0 02/27/2023     Magnesium:   Lab Results   Component Value Date    MG 1 8 (L) 02/27/2023     Urinalysis: No results found for: Chandana Kylah, SPECGRAV, PHUR, LEUKOCYTESUR, NITRITE, PROTEINUA, GLUCOSEU, KETONESU, BILIRUBINUR, BLOODU  Ionized Calcium: No results found for: CAION  Coagulation: No results found for: PT, INR, APTT  Troponin: No results found for: TROPONINI  ABG: No results found for: PHART, COQ4HXC, PO2ART, VPX7OQP, V7GXNKQM, BEART, SOURCE  Radiology review:     IMAGING  Procedure: CT chest abdomen pelvis w contrast    Result Date: 2/25/2023  Narrative: CT CHEST, ABDOMEN AND PELVIS WITH IV CONTRAST INDICATION:   Occult malignancy severe hyponatremia, please look for underlying malignancy  COMPARISON:  2/2/2023, 5/23/2022 at 5/20/2022  TECHNIQUE: CT examination of the chest, abdomen and pelvis was performed  Axial, sagittal, and coronal 2D reformatted images were created from the source data and submitted for interpretation  Radiation dose length product (DLP) for this visit:  633 mGy-cm   This examination, like all CT scans performed in the [facility], was performed utilizing techniques to minimize radiation dose exposure, including the use of iterative reconstruction and automated exposure control   IV Contrast:  100 mL of iohexol (OMNIPAQUE) Enteric Contrast: Enteric contrast was administered  FINDINGS: CHEST LUNGS:  Mild dependent changes noted right lung base  PLEURA:  Unremarkable  HEART/GREAT VESSELS: Heart is unremarkable for patient's age  No thoracic aortic aneurysm  MEDIASTINUM AND MANE:  Unremarkable  CHEST WALL AND LOWER NECK:  Unremarkable  ABDOMEN LIVER/BILIARY TREE:  Unremarkable  GALLBLADDER:  No calcified gallstones  No pericholecystic inflammatory change  SPLEEN:  Calcified splenic granulomas  PANCREAS:  Large pancreatic head pseudocyst measuring 6 4 x 6 5 cm, stable allowing for differences in measurement technique  Some peripheral calcifications noted as before  Continued prominence of the main pancreatic duct may reflect partial obstruction from the pseudocyst itself  Again correlate with lipase  ADRENAL GLANDS:  Unremarkable  KIDNEYS/URETERS:  Unremarkable  No hydronephrosis  STOMACH AND BOWEL:  Unremarkable  APPENDIX:  No findings to suggest appendicitis  ABDOMINOPELVIC CAVITY:  No ascites  No pneumoperitoneum  No lymphadenopathy  VESSELS:  Unremarkable for patient's age  PELVIS REPRODUCTIVE ORGANS:  Unremarkable for patient's age  URINARY BLADDER:  Unremarkable  ABDOMINAL WALL/INGUINAL REGIONS:  Unremarkable  OSSEOUS STRUCTURES:  Chronic right medial clavicular fracture  ORIF right humerus  Chronic sacral and left inferior pubic ramus fractures  Old trauma throughout the spine and ribs  Stable multilevel thoracic vertebral compression fractures with exaggerated kyphosis  Cervicothoracic fusion hardware noted  Impression: 1  No suspected malignancy throughout the chest, abdomen or pelvis  2   Chronic pancreatic pseudocyst, stable  3   Old fractures throughout the chest, abdomen and pelvis   Workstation performed: [id number]       Current Facility-Administered Medications   Medication Dose Route Frequency   • cefTRIAXone (ROCEPHIN) IVPB (premix in dextrose) 1,000 mg 50 mL 1,000 mg Intravenous Q24H   • cholecalciferol (VITAMIN D3) tablet 1,000 Units  1,000 Units Oral Daily   • cyanocobalamin (VITAMIN B-12) tablet 100 mcg  100 mcg Oral Daily   • Diclofenac Sodium (VOLTAREN) 1 % topical gel 2 g  2 g Topical 4x Daily PRN   • diphenhydrAMINE (BENADRYL) tablet 25 mg  25 mg Oral Q6H PRN   • docusate sodium (COLACE) capsule 100 mg  100 mg Oral BID PRN   • enoxaparin (LOVENOX) subcutaneous injection 40 mg  40 mg Subcutaneous Y60A   • folic acid 1 mg, thiamine (VITAMIN B1) 100 mg in sodium chloride 0 9 % 100 mL IV piggyback   Intravenous Daily   • levETIRAcetam (KEPPRA) tablet 750 mg  750 mg Oral Q12H [address]   • LORazepam (ATIVAN) tablet 0 5 mg  0 5 mg Oral HS   • midodrine (PROAMATINE) tablet 5 mg  5 mg Oral TID AC   • multivitamin-minerals (CENTRUM) tablet 1 tablet  1 tablet Oral Daily   • nicotine (NICODERM CQ) 14 mg/24hr TD 24 hr patch 14 mg  14 mg Transdermal Daily   • ondansetron (ZOFRAN) injection 4 mg  4 mg Intravenous Q6H PRN   • sodium chloride 0 9 % infusion  75 mL/hr Intravenous Continuous     Medications Discontinued During This Encounter   Medication Reason   • levETIRAcetam (KEPPRA) tablet 1,000 mg        Rex Carpenter PA-C    Portions of the record may have been created with voice recognition software  Occasional wrong word or "sound a like" substitutions may have occurred due to the inherent limitations of voice recognition software  Read the chart carefully and recognize, using context, where substitutions have occurred

## 2023-02-27 NOTE — ASSESSMENT & PLAN NOTE
· Outpatient follow-up and surveillance imaging with Gastroenterology    CT scan of the chest/abdomen/pelvis (02/25/2023):  PANCREAS:  Large pancreatic head pseudocyst measuring 6 4 x 6 5 cm, stable allowing for differences in measurement technique  Some peripheral calcifications noted as before  Continued prominence of the main pancreatic duct may reflect partial obstruction from the pseudocyst itself  Again correlate with lipase  Chronic pancreatic pseudocyst, stable

## 2023-02-27 NOTE — ASSESSMENT & PLAN NOTE
· Asymptomatic hypo-osmolar hyponatremia  · Nephrology's input appreciated   · Corrected too quickly due to requiring a total of 1000 ml of NSS IV fluids to treat hypotension  · Fluid restriction of 1200 ml/24 hours  · No sign of malignancy on CT scan of the chest/abdomen/pelvis with IV contrast completed on 02/25/2023  · Consult Endocrinology for an evaluation for possible adrenal insufficiency -previously evaluated and with normal levels patient's Cortef was discontinued  · With concern for fluid depletion will be given IV normal saline  · We will follow nephrology's recommendations    Results from last 7 days   Lab Units 02/27/23  1404 02/27/23  0512 02/27/23  0007   SODIUM mmol/L 124* 123* 123*   POTASSIUM mmol/L 4 4 4 2 4 2   CHLORIDE mmol/L 93* 93* 94*   CO2 mmol/L 24 25 23   BUN mg/dL 19 22 26*   CREATININE mg/dL 0 94 0 82 0 95   CALCIUM mg/dL 9 1 8 8 8 9

## 2023-02-27 NOTE — PLAN OF CARE
Problem: MOBILITY - ADULT  Goal: Maintain or return to baseline ADL function  Description: INTERVENTIONS:  -  Assess patient's ability to carry out ADLs; assess patient's baseline for ADL function and identify physical deficits which impact ability to perform ADLs (bathing, care of mouth/teeth, toileting, grooming, dressing, etc )  - Assess/evaluate cause of self-care deficits   - Assess range of motion  - Assess patient's mobility; develop plan if impaired  - Assess patient's need for assistive devices and provide as appropriate  - Encourage maximum independence but intervene and supervise when necessary  - Involve family in performance of ADLs  - Assess for home care needs following discharge   - Consider OT consult to assist with ADL evaluation and planning for discharge  - Provide patient education as appropriate  Outcome: Progressing  Goal: Maintains/Returns to pre admission functional level  Description: INTERVENTIONS:  - Perform BMAT or MOVE assessment daily    - Set and communicate daily mobility goal to care team and patient/family/caregiver  - Collaborate with rehabilitation services on mobility goals if consulted  - Perform Range of Motion 3 times a day  - Reposition patient every 2 hours    - Dangle patient 3 times a day  - Stand patient 3 times a day  - Ambulate patient 3 times a day  - Out of bed to chair 3 times a day   - Out of bed for meals 3 times a day  - Out of bed for toileting  - Record patient progress and toleration of activity level   Outcome: Progressing     Problem: PAIN - ADULT  Goal: Verbalizes/displays adequate comfort level or baseline comfort level  Description: Interventions:  - Encourage patient to monitor pain and request assistance  - Assess pain using appropriate pain scale  - Administer analgesics based on type and severity of pain and evaluate response  - Implement non-pharmacological measures as appropriate and evaluate response  - Consider cultural and social influences on pain and pain management  - Notify physician/advanced practitioner if interventions unsuccessful or patient reports new pain  Outcome: Progressing     Problem: SAFETY ADULT  Goal: Maintain or return to baseline ADL function  Description: INTERVENTIONS:  -  Assess patient's ability to carry out ADLs; assess patient's baseline for ADL function and identify physical deficits which impact ability to perform ADLs (bathing, care of mouth/teeth, toileting, grooming, dressing, etc )  - Assess/evaluate cause of self-care deficits   - Assess range of motion  - Assess patient's mobility; develop plan if impaired  - Assess patient's need for assistive devices and provide as appropriate  - Encourage maximum independence but intervene and supervise when necessary  - Involve family in performance of ADLs  - Assess for home care needs following discharge   - Consider OT consult to assist with ADL evaluation and planning for discharge  - Provide patient education as appropriate  Outcome: Progressing  Goal: Maintains/Returns to pre admission functional level  Description: INTERVENTIONS:  - Perform BMAT or MOVE assessment daily    - Set and communicate daily mobility goal to care team and patient/family/caregiver  - Collaborate with rehabilitation services on mobility goals if consulted  - Perform Range of Motion 3 times a day  - Reposition patient every 2 hours    - Dangle patient 3 times a day  - Stand patient 3 times a day  - Ambulate patient 3 times a day  - Out of bed to chair 3 times a day   - Out of bed for meals 3 times a day  - Out of bed for toileting  - Record patient progress and toleration of activity level   Outcome: Progressing  Goal: Patient will remain free of falls  Description: INTERVENTIONS:  - Educate patient/family on patient safety including physical limitations  - Instruct patient to call for assistance with activity   - Consult OT/PT to assist with strengthening/mobility   - Keep Call bell within reach  - Keep bed low and locked with side rails adjusted as appropriate  - Keep care items and personal belongings within reach  - Initiate and maintain comfort rounds  - Make Fall Risk Sign visible to staff  - Offer Toileting every 2 Hours, in advance of need  - Initiate/Maintain bed alarm  - Apply yellow socks and bracelet for high fall risk patients  - Consider moving patient to room near nurses station  Outcome: Progressing     Problem: DISCHARGE PLANNING  Goal: Discharge to home or other facility with appropriate resources  Description: INTERVENTIONS:  - Identify barriers to discharge w/patient and caregiver  - Arrange for needed discharge resources and transportation as appropriate  - Identify discharge learning needs (meds, wound care, etc )  - Arrange for interpretive services to assist at discharge as needed  - Refer to Case Management Department for coordinating discharge planning if the patient needs post-hospital services based on physician/advanced practitioner order or complex needs related to functional status, cognitive ability, or social support system  Outcome: Progressing     Problem: HEMATOLOGIC - ADULT  Goal: Maintains hematologic stability  Description: INTERVENTIONS  - Assess for signs and symptoms of bleeding or hemorrhage  - Monitor labs  - Administer supportive blood products/factors as ordered and appropriate  Outcome: Progressing     Problem: MUSCULOSKELETAL - ADULT  Goal: Maintain or return mobility to safest level of function  Description: INTERVENTIONS:  - Assess patient's ability to carry out ADLs; assess patient's baseline for ADL function and identify physical deficits which impact ability to perform ADLs (bathing, care of mouth/teeth, toileting, grooming, dressing, etc )  - Assess/evaluate cause of self-care deficits   - Assess range of motion  - Assess patient's mobility  - Assess patient's need for assistive devices and provide as appropriate  - Encourage maximum independence but intervene and supervise when necessary  - Involve family in performance of ADLs  - Assess for home care needs following discharge   - Consider OT consult to assist with ADL evaluation and planning for discharge  - Provide patient education as appropriate  Outcome: Progressing  Goal: Maintain proper alignment of affected body part  Description: INTERVENTIONS:  - Support, maintain and protect limb and body alignment  - Provide patient/ family with appropriate education  Outcome: Progressing     Problem: METABOLIC, FLUID AND ELECTROLYTES - ADULT  Goal: Electrolytes maintained within normal limits  Description: INTERVENTIONS:  - Monitor labs and assess patient for signs and symptoms of electrolyte imbalances  - Administer electrolyte replacement as ordered  - Monitor response to electrolyte replacements, including repeat lab results as appropriate  - Instruct patient on fluid and nutrition as appropriate  Outcome: Progressing  Goal: Fluid balance maintained  Description: INTERVENTIONS:  - Monitor labs   - Monitor I/O and WT  - Instruct patient on fluid and nutrition as appropriate  - Assess for signs & symptoms of volume excess or deficit  Outcome: Progressing  Goal: Glucose maintained within target range  Description: INTERVENTIONS:  - Monitor Blood Glucose as ordered  - Assess for signs and symptoms of hyperglycemia and hypoglycemia  - Administer ordered medications to maintain glucose within target range  - Assess nutritional intake and initiate nutrition service referral as needed  Outcome: Progressing

## 2023-02-27 NOTE — QUICK NOTE
Endocrinology quick note:    Chart reviewed  Patient not seen or examined by me personally  Patient admitted to [facility] for outpatient labs showing hyponatremia  Presenting labs show Na 112, K 4 6, Cl 78, glucose 62, creatinine 0 83  Serum osm 239, urine Na 24, urine osm 189, TSH 3 028, and AM cortisol 9  Patient hypotensive now on midodrine  Patient with history of alcoholic cirrhosis  I am familiar with his care as he follows with me in clinic for osteoporosis  He has been evaluated for low serum cortisol levels in the past      Serum sodium improved from admission, but remains low at 123  He has been fluid restricted and has been receiving cautious boluses of IV NS  I appreciate Nephrology's concern for possible adrenal insufficiency  Will proceed with cosyntropin stimulation test  Would suggest preparing the protocol for tomorrow AM      For cosyntropin test, baseline labs for ACTH and cortisol should be obtained prior to administration with cosyntropin 250 mcg  Labs should be obtained for cortisol at 30- and 60-minutes post cosyntropin administration  The test is over after the 60 minute collection      Full consultation to follow tomorrow with results from stimulation test

## 2023-02-27 NOTE — PLAN OF CARE
Problem: PAIN - ADULT  Goal: Verbalizes/displays adequate comfort level or baseline comfort level  Description: Interventions:  - Encourage patient to monitor pain and request assistance  - Assess pain using appropriate pain scale  - Administer analgesics based on type and severity of pain and evaluate response  - Implement non-pharmacological measures as appropriate and evaluate response  - Consider cultural and social influences on pain and pain management  - Notify physician/advanced practitioner if interventions unsuccessful or patient reports new pain  2/27/2023 0012 by Jessi Daniels RN  Outcome: Progressing     Problem: SAFETY ADULT  Goal: Maintain or return to baseline ADL function  Description: INTERVENTIONS:  -  Assess patient's ability to carry out ADLs; assess patient's baseline for ADL function and identify physical deficits which impact ability to perform ADLs (bathing, care of mouth/teeth, toileting, grooming, dressing, etc )  - Assess/evaluate cause of self-care deficits   - Assess range of motion  - Assess patient's mobility; develop plan if impaired  - Assess patient's need for assistive devices and provide as appropriate  - Encourage maximum independence but intervene and supervise when necessary  - Involve family in performance of ADLs  - Assess for home care needs following discharge   - Consider OT consult to assist with ADL evaluation and planning for discharge  - Provide patient education as appropriate  2/27/2023 0012 by Jessi Daniels RN  Outcome: Progressing  Goal: Maintains/Returns to pre admission functional level  Description: INTERVENTIONS:  - Perform BMAT or MOVE assessment daily    - Set and communicate daily mobility goal to care team and patient/family/caregiver  - Collaborate with rehabilitation services on mobility goals if consulted  - Perform Range of Motion 3 times a day  - Reposition patient every 2 hours    - Dangle patient 3 times a day  - Stand patient 3 times a day  - Ambulate patient 3 times a day  - Out of bed to chair 3 times a day   - Out of bed for meals 3 times a day  - Out of bed for toileting  - Record patient progress and toleration of activity level   2/27/2023 0012 by Michael Grossman RN  Outcome: Progressing  Goal: Patient will remain free of falls  Description: INTERVENTIONS:  - Educate patient/family on patient safety including physical limitations  - Instruct patient to call for assistance with activity   - Consult OT/PT to assist with strengthening/mobility   - Keep Call bell within reach  - Keep bed low and locked with side rails adjusted as appropriate  - Keep care items and personal belongings within reach  - Initiate and maintain comfort rounds  - Make Fall Risk Sign visible to staff  - Offer Toileting every 2 Hours, in advance of need  - Initiate/Maintain bed alarm  - Apply yellow socks and bracelet for high fall risk patients  - Consider moving patient to room near nurses station  2/27/2023 0012 by Michael Grossman RN  Outcome: Progressing     Problem: DISCHARGE PLANNING  Goal: Discharge to home or other facility with appropriate resources  Description: INTERVENTIONS:  - Identify barriers to discharge w/patient and caregiver  - Arrange for needed discharge resources and transportation as appropriate  - Identify discharge learning needs (meds, wound care, etc )  - Arrange for interpretive services to assist at discharge as needed  - Refer to Case Management Department for coordinating discharge planning if the patient needs post-hospital services based on physician/advanced practitioner order or complex needs related to functional status, cognitive ability, or social support system  2/27/2023 0012 by Michael Grossman RN  Outcome: Progressing     Problem: HEMATOLOGIC - ADULT  Goal: Maintains hematologic stability  Description: INTERVENTIONS  - Assess for signs and symptoms of bleeding or hemorrhage  - Monitor labs  - Administer supportive blood products/factors as ordered and appropriate  2/27/2023 0012 by Aubree Camacho RN  Outcome: Progressing     Problem: METABOLIC, FLUID AND ELECTROLYTES - ADULT  Goal: Electrolytes maintained within normal limits  Description: INTERVENTIONS:  - Monitor labs and assess patient for signs and symptoms of electrolyte imbalances  - Administer electrolyte replacement as ordered  - Monitor response to electrolyte replacements, including repeat lab results as appropriate  - Instruct patient on fluid and nutrition as appropriate  Outcome: Progressing  Goal: Fluid balance maintained  Description: INTERVENTIONS:  - Monitor labs   - Monitor I/O and WT  - Instruct patient on fluid and nutrition as appropriate  - Assess for signs & symptoms of volume excess or deficit  Outcome: Progressing  Goal: Glucose maintained within target range  Description: INTERVENTIONS:  - Monitor Blood Glucose as ordered  - Assess for signs and symptoms of hyperglycemia and hypoglycemia  - Administer ordered medications to maintain glucose within target range  - Assess nutritional intake and initiate nutrition service referral as needed  Outcome: Progressing

## 2023-02-27 NOTE — CASE MANAGEMENT
Case Management Assessment    Patient name Thomas Martin  Location [address] [id number] MRN [id number]  : 1965 Date 2023       Current Admission Date: 2023  Current Admission Diagnosis:Hyponatremia   Patient Active Problem List    Diagnosis Date Noted   • Hypotension 2023   • History of alcohol abuse 2023   • Hypoglycemia 2023   • Acute cystitis with hematuria 2023   • Insomnia 2023   • Moderate protein-calorie malnutrition ([address] ) 2023   • Esophageal abnormality 2023   • Abnormal CXR 2023   • Osteoporosis with current pathological fracture 2022   • Chronic anemia 2022   • Clavicular fracture 2022   • Low serum cortisol level 2022   • Hypocalcemia 2022   • Elevated d-dimer 2022   • COVID-19 virus infection 2022   • Alcohol use 2022   • Thoracic compression fracture ([address] ) 2022   • Aortic ectasia ([address] ) 2022   • Rib fractures 2022   • Seizure disorder ([address] ) 2022   • Hypoxia 2022   • Traore's esophagus 2022   • Ambulatory dysfunction 2021   • Current every day smoker 2021   • Fall 2021   • Hx of duodenal ulcer 2021   • Neck pain 2021   • Acute on chronic pancreatitis ([address] ) 2020   • Pancreatic pseudocyst 2020   • COPD (chronic obstructive pulmonary disease) ([address] ) 2020   • Ileus ([address] ) 2020   • Abnormal EKG 2020   • Lesion of spleen 2020   • Left anterior fascicular block 2020   • Constipation 2020   • Transaminitis 2020   • Celiac artery stenosis ([address] ) 2020   • Pancreatic mass 2020   • Pancytopenia ([address] ) 2020   • Tobacco use 2020   • Traore esophagus    • Duodenal ulcer 2019   • Tubular adenoma 2019   • Iron deficiency anemia due to chronic blood loss 10/22/2019   • Closed fracture of left olecranon process, initial encounter 2019   • Thrombocytopenia ([facility] ) 02/22/1314   • Alcoholic cirrhosis of liver without ascites ([facility] ) 08/21/2018   • Seizure-like activity ([facility] ) 05/22/2018   • Diarrhea 05/22/2018   • Abnormality of pancreatic duct 11/02/2017   • Hx of seizure disorder 10/12/2017   • Incisional hernia 10/08/2017   • Continuous chronic alcoholism ([facility] ) 10/05/2017   • T6 vertebral fracture ([facility] ) 09/21/2017   • Neuropathy involving both lower extremities 08/15/2017   • Hyponatremia 07/29/2017   • Hypokalemia 07/29/2017   • Malnutrition of moderate degree (HCC) 05/18/2017   • Generalized weakness 05/17/2017   • Bladder wall thickening 03/09/2017   • Hypophosphatemia 03/09/2017   • Urinary retention 09/22/2337   • Alcoholic hepatitis without ascites 03/07/2017   • Vitamin D deficiency 03/07/2017   • Iron deficiency 03/07/2017   • Depression 02/08/2017   • Elevated alkaline phosphatase level 01/14/2017   • Hepatomegaly 01/12/2017   • Hepatic steatosis 01/12/2017   • Hypomagnesemia 01/12/2017   • Chronic hyponatremia 01/11/2017   • Dietary folate deficiency anemia 01/11/2017   • Ventral hernia without obstruction or gangrene 01/11/2017   • Abdominal wound dehiscence 12/15/2016   • Microcytic anemia 06/20/2016   • Allergic rhinitis 06/07/2016   • Tobacco abuse 05/22/2016   • Essential hypertension 05/22/2016   • H/O suicide attempt 05/22/2016   • H/O cervical spine surgery 05/22/2016   • Left foot drop 10/31/2014   • Peripheral neuropathy 10/31/2014   • Cervical disc disorder with myelopathy 09/25/2014   • Lumbar radiculopathy 09/25/2014   • Cervical spinal stenosis 07/30/2014      LOS (days): 2  Geometric Mean LOS (GMLOS) (days):   Days to GMLOS:     OBJECTIVE:  PATIENT READMITTED TO HOSPITAL  Risk of Unplanned Readmission Score: 17         Current admission status: Inpatient       Preferred Pharmacy:   [facility]   [address] [facility] [address]  Phone: [phone] Fax: [phone]    Primary Care Provider: Renetta Negrete DO    Primary Insurance: Martinez CASANOVA  Secondary Insurance:     ASSESSMENT:  [id number] Chele Alcala - Sister   Primary Phone: [phone] (Mobile)  Home Phone: [phone]               Advance Directives  Does patient have a [address]?: No  Was patient offered paperwork?: Yes (declined)  Does patient currently have a Health Care decision maker?: Yes, please see Health Care Proxy section  Does patient have Advance Directives?: No  Was patient offered paperwork?: Yes (declined)  Primary Contact: Naomy Wiley-         Readmission Root Cause  30 Day Readmission: Yes  Who directed you to return to the hospital?: PCP (had labs and was told to come to hospital)  Did you understand whom to contact if you had questions or problems?: Yes  Did you get your prescriptions before you left the hospital?: No  Reason[de-identified] Preference for own pharmacy  Were you able to get your prescriptions filled when you left the hospital?: Yes  Did you take your medications as prescribed?: Yes  Were you able to get to your follow-up appointments?: Yes  During previous admission, was a post-acute recommendation made?: No  Patient was readmitted due to: hyponatremia  Action Plan: ivf, labs    Patient Information  Admitted from[de-identified] Home  Mental Status: Alert  During Assessment patient was accompanied by: Not accompanied during assessment  Assessment information provided by[de-identified] Patient  Primary Caregiver: Self  Support Systems: Self, Family members  [place] of Residence: Other (specify in comment box) (Armaan Sheppard)  What city do you live in?: patient mailing address is [place], but he is currently staying at his brothers house in [address] entry access options   Select all that apply : Stairs  Number of steps to enter home : One Flight  Do the steps have railings?: Yes  Type of Current Residence: 2 story home  Upon entering residence, is there a bedroom on the main floor (no further steps)?: Yes  Upon entering residence, is there a bathroom on the main floor (no further steps)?: Yes  In the last 12 months, was there a time when you were not able to pay the mortgage or rent on time?: No  In the last 12 months, how many places have you lived?: 1  In the last 12 months, was there a time when you did not have a steady place to sleep or slept in a shelter (including now)?: No  Homeless/housing insecurity resource given?: N/A  Living Arrangements: Other (Comment) (lives with brother)    Activities of Daily Living Prior to Admission  Functional Status: Independent  Completes ADLs independently?: Yes  Ambulates independently?: Yes  Does patient use assisted devices?: Yes  Assisted Devices (DME) used: Straight Blaire Janus, Wheelchair, [employer] [place]  Does patient currently own DME?: Yes  What DME does the patient currently own?: Straight Tank [place], Amy Stalling, Wheelchair, [employer] [place]  Does patient have a history of Outpatient Therapy (PT/OT)?: Yes (currently going to [place] o/p therapy)  Does the patient have a history of Short-Term Rehab?: No  Does patient have a history of HHC?: No  Does patient currently have [facility]?: No         Patient Information Continued  Income Source: SSI/SSD  Does patient have prescription coverage?: Yes  Within the past 12 months, you worried that your food would run out before you got the money to buy more : Never true  Within the past 12 months, the food you bought just didn't last and you didn't have money to get more : Never true  Food insecurity resource given?: N/A  Does patient receive dialysis treatments?: No  Does patient have a history of substance abuse?: Yes  Historical substance use preference: Alcohol/ETOH  History of Withdrawal Symptoms: Denies past symptoms (patient states he is sober for 10 months and only drinks non alcoholic drinks)  Is patient currently in treatment for substance abuse?: N/A - sober  Does patient have a history of Mental Health Diagnosis?: No         Means of Transportation  Means of Transport to Appts[de-identified] Family transport  In the past 12 months, has lack of transportation kept you from medical appointments or from getting medications?: No  In the past 12 months, has lack of transportation kept you from meetings, work, or from getting things needed for daily living?: No  Was application for public transport provided?: N/A  Cm met with the patient to evaluate the patients prior function and living situation and any barriers to d/c and form a safe d/c plan  Cm also evaluated the patient for any services in the home or needs for services  CM also discussed with patient that their preferences will be taken into account/consideration  Pt had labs as o/p and was sent in due to low sodium  Nephrology consulted  IVF to treat, along with fluid restrictions  No current discharge needs noted  CM to follow

## 2023-02-27 NOTE — ASSESSMENT & PLAN NOTE
· Recent elevated keppra level on 02/23/2023  · Repeat level pending  · For now continue decreased keppra dosing at 750 mg PO every 12 hours  · Outpatient follow-up with Neurology       Latest Reference Range & Units 02/23/23 09:17   LEVETIRACETA (KEPPRA) 10 0 - 40 0 ug/mL 44 1 (H)   (H): Data is abnormally high

## 2023-02-27 NOTE — PROGRESS NOTES
[address] [facility]  Progress Note - Pillo Huang 1965, 65 y o  male MRN: [id number]  Unit/Bed#: 850-28 Encounter: [id number]  Primary Care Provider: Bhumi Olguin DO   Date and time admitted to hospital: 2/25/2023  8:15 AM    * Hyponatremia  Assessment & Plan  · Asymptomatic hypo-osmolar hyponatremia  · Nephrology's input appreciated   · Corrected too quickly due to requiring a total of 1000 ml of NSS IV fluids to treat hypotension  · Fluid restriction of 1200 ml/24 hours  · No sign of malignancy on CT scan of the chest/abdomen/pelvis with IV contrast completed on 02/25/2023  · Consult Endocrinology for an evaluation for possible adrenal insufficiency -previously evaluated and with normal levels patient's Cortef was discontinued  · With concern for fluid depletion will be given IV normal saline  · We will follow nephrology's recommendations    Results from last 7 days   Lab Units 02/27/23  1404 02/27/23  0512 02/27/23  0007   SODIUM mmol/L 124* 123* 123*   POTASSIUM mmol/L 4 4 4 2 4 2   CHLORIDE mmol/L 93* 93* 94*   CO2 mmol/L 24 25 23   BUN mg/dL 19 22 26*   CREATININE mg/dL 0 94 0 82 0 95   CALCIUM mg/dL 9 1 8 8 8 9         Acute cystitis with hematuria  Assessment & Plan  · >100K colonies GNR  · Await the urine culture results  · Continue ceftriaxone 1000 mg IV every 24 hours (day #3)  • Outpatient Urology evaluation    History of alcohol abuse  Assessment & Plan  · The patient reported that in the last 10 months he only drinks non-alcoholic beer, which he currently drinks 3 per day    · Give folic acid 1 mg IV Qdaily and thiamine 100 mg IV Qdaily  · Daily PO multivitamin  · CIWA protocol  · No evidence of withdrawal     Seizure disorder ([address] )  Assessment & Plan  · Recent elevated keppra level on 02/23/2023  · Repeat level pending  · For now continue decreased keppra dosing at 750 mg PO every 12 hours  · Outpatient follow-up with Neurology       Latest Reference Range & Units 02/23/23 09:17   LEVETIRACETA (KEPPRA) 10 0 - 40 0 ug/mL 44 1 (H)   (H): Data is abnormally high    Pancreatic pseudocyst  Assessment & Plan    · Outpatient follow-up and surveillance imaging with Gastroenterology    CT scan of the chest/abdomen/pelvis (2023):  PANCREAS:  Large pancreatic head pseudocyst measuring 6 4 x 6 5 cm, stable allowing for differences in measurement technique  Some peripheral calcifications noted as before  Continued prominence of the main pancreatic duct may reflect partial obstruction from the pseudocyst itself  Again correlate with lipase  Chronic pancreatic pseudocyst, stable  VTE Pharmacologic Prophylaxis: VTE Score: 3 Moderate Risk (Score 3-4) - Pharmacological DVT Prophylaxis Ordered: enoxaparin (Lovenox)  Patient Centered Rounds: I performed bedside rounds with nursing staff today  Discussions with Specialists or Other Care Team Provider: Nephrology     Total Time Spent on Date of Encounter in care of patient: 45 minutes This time was spent on one or more of the following: performing physical exam; counseling and coordination of care; obtaining or reviewing history; documenting in the medical record; reviewing/ordering tests, medications or procedures; communicating with other healthcare professionals and discussing with patient's family/caregivers  Current Length of Stay: 2 day(s)  Current Patient Status: Inpatient   Certification Statement: The patient will continue to require additional inpatient hospital stay due to close monitoring   Discharge Plan: Anticipate discharge in 24-48 hrs to home  Code Status: Level 1 - Full Code    Subjective:   Patient seen and examined  She hopes that his fluid restriction can be relaxed  She voices no complaints otherwise  She also hopes to be discharged home soon      Objective:     Vitals:   Temp (24hrs), Av 2 °F (36 8 °C), Min:97 7 °F (36 5 °C), Max:98 7 °F (37 1 °C)    Temp:  [97 7 °F (36 5 °C)-98 7 °F (37 1 °C)] 98 7 °F (37 1 °C)  HR:  [72-98] 90  Resp:  [15-16] 16  BP: ()/(65-79) 115/79  SpO2:  [92 %-98 %] 98 %  Body mass index is 19 56 kg/m²  Input and Output Summary (last 24 hours): Intake/Output Summary (Last 24 hours) at 2/27/2023 1524  Last data filed at 2/27/2023 1324  Gross per 24 hour   Intake 880 ml   Output 250 ml   Net 630 ml       Physical Exam:   Physical Exam  Constitutional:       General: He is not in acute distress  HENT:      Head: Normocephalic and atraumatic  Nose: No congestion  Eyes:      Conjunctiva/sclera: Conjunctivae normal    Cardiovascular:      Rate and Rhythm: Normal rate and regular rhythm  Heart sounds: No murmur heard  Abdominal:      General: There is no distension  Tenderness: There is no abdominal tenderness  There is no guarding  Musculoskeletal:      Right lower leg: No edema  Left lower leg: No edema  Skin:     General: Skin is warm and dry  Neurological:      Mental Status: He is oriented to person, place, and time     Psychiatric:         Mood and Affect: Mood normal           Additional Data:     Labs:  Results from last 7 days   Lab Units 02/27/23  0512   WBC Thousand/uL 5 98   HEMOGLOBIN g/dL 12 1   HEMATOCRIT % 34 6*   PLATELETS Thousands/uL 182   NEUTROS PCT % 59   LYMPHS PCT % 25   MONOS PCT % 13*   EOS PCT % 1     Results from last 7 days   Lab Units 02/27/23  1404 02/27/23  0512   SODIUM mmol/L 124* 123*   POTASSIUM mmol/L 4 4 4 2   CHLORIDE mmol/L 93* 93*   CO2 mmol/L 24 25   BUN mg/dL 19 22   CREATININE mg/dL 0 94 0 82   ANION GAP mmol/L 7 5   CALCIUM mg/dL 9 1 8 8   ALBUMIN g/dL  --  3 8   TOTAL BILIRUBIN mg/dL  --  0 35   ALK PHOS U/L  --  108*   ALT U/L  --  8   AST U/L  --  19   GLUCOSE RANDOM mg/dL 106 85             Results from last 7 days   Lab Units 02/26/23  0617   HEMOGLOBIN A1C % 5 0     Results from last 7 days   Lab Units 02/27/23  0512 02/26/23  0617   PROCALCITONIN ng/ml 0 06 0 05       Lines/Drains:  Invasive Devices Peripheral Intravenous Line  Duration           Peripheral IV 02/27/23 Right;Upper;Ventral (anterior) Arm <1 day                  Imaging: Reviewed radiology reports from this admission including: chest CT scan and abdominal/pelvic CT and Personally reviewed the following imaging: chest CT scan and abdominal/pelvic CT    Recent Cultures (last 7 days):   Results from last 7 days   Lab Units 02/25/23  1112   URINE CULTURE  >100,000 cfu/ml Gram Negative Juan*       Last 24 Hours Medication List:   Current Facility-Administered Medications   Medication Dose Route Frequency Provider Last Rate   • cefTRIAXone  1,000 mg Intravenous Q24H Bernardine Poke Brad, DO 1,000 mg (02/27/23 1414)   • cholecalciferol  1,000 Units Oral Daily Bernardine Poke [place], DO     • cyanocobalamin  100 mcg Oral Daily Bernardine Poke [place], DO     • Diclofenac Sodium  2 g Topical 4x Daily PRN Bernardine Poke Brad, DO     • diphenhydrAMINE  25 mg Oral Q6H PRN Bernardine Poke Brad, DO     • docusate sodium  100 mg Oral BID PRN Bernardine Poke [place], DO     • enoxaparin  40 mg Subcutaneous Q24H Bernardine Poke Brad, DO     • folic acid 1 mg, thiamine 100 mg in 0 9% sodium chloride 100 mL IVPB   Intravenous Daily Bernardine Poke [place], DO Stopped (02/27/23 1214)   • levETIRAcetam  750 mg Oral Q12H [address] Bernardine Poke Brad, DO     • LORazepam  0 5 mg Oral HS Bernardine Poke Brad, DO     • midodrine  5 mg Oral TID AC Bernardine Poke [place], DO     • multivitamin-minerals  1 tablet Oral Daily Bernardine Poke [place], DO     • nicotine  14 mg Transdermal Daily Bernardine Poke Brad, DO     • ondansetron  4 mg Intravenous Q6H PRN Bernardine Poke Brad, DO     • sodium chloride  75 mL/hr Intravenous Continuous Danielle Harvey PA-C 75 mL/hr (02/27/23 1409)        Today, Patient Was Seen By: Cori Sears MD    **Please Note: This note may have been constructed using a voice recognition system  **

## 2023-02-28 LAB
ANION GAP SERPL CALCULATED.3IONS-SCNC: 5 MMOL/L (ref 4–13)
ANION GAP SERPL CALCULATED.3IONS-SCNC: 6 MMOL/L (ref 4–13)
ANION GAP SERPL CALCULATED.3IONS-SCNC: 7 MMOL/L (ref 4–13)
BASOPHILS # BLD AUTO: 0.04 THOUSANDS/ÂΜL (ref 0–0.1)
BASOPHILS NFR BLD AUTO: 1 % (ref 0–1)
BUN SERPL-MCNC: 12 MG/DL (ref 5–25)
BUN SERPL-MCNC: 14 MG/DL (ref 5–25)
BUN SERPL-MCNC: 17 MG/DL (ref 5–25)
CALCIUM SERPL-MCNC: 8.7 MG/DL (ref 8.4–10.2)
CALCIUM SERPL-MCNC: 8.7 MG/DL (ref 8.4–10.2)
CALCIUM SERPL-MCNC: 9 MG/DL (ref 8.4–10.2)
CANCER AG19-9 SERPL-ACNC: <2 U/ML (ref 0–35)
CHLORIDE SERPL-SCNC: 95 MMOL/L (ref 96–108)
CHLORIDE SERPL-SCNC: 96 MMOL/L (ref 96–108)
CHLORIDE SERPL-SCNC: 97 MMOL/L (ref 96–108)
CO2 SERPL-SCNC: 22 MMOL/L (ref 21–32)
CO2 SERPL-SCNC: 25 MMOL/L (ref 21–32)
CO2 SERPL-SCNC: 26 MMOL/L (ref 21–32)
CORTIS SERPL-MCNC: 13.5 UG/DL
CORTIS SERPL-MCNC: 34.1 UG/DL
CREAT SERPL-MCNC: 0.71 MG/DL (ref 0.6–1.3)
CREAT SERPL-MCNC: 0.72 MG/DL (ref 0.6–1.3)
CREAT SERPL-MCNC: 0.8 MG/DL (ref 0.6–1.3)
EOSINOPHIL # BLD AUTO: 0.08 THOUSAND/ÂΜL (ref 0–0.61)
EOSINOPHIL NFR BLD AUTO: 1 % (ref 0–6)
ERYTHROCYTE [DISTWIDTH] IN BLOOD BY AUTOMATED COUNT: 13.3 % (ref 11.6–15.1)
GFR SERPL CREATININE-BSD FRML MDRD: 104 ML/MIN/1.73SQ M
GFR SERPL CREATININE-BSD FRML MDRD: 104 ML/MIN/1.73SQ M
GFR SERPL CREATININE-BSD FRML MDRD: 99 ML/MIN/1.73SQ M
GLUCOSE SERPL-MCNC: 105 MG/DL (ref 65–140)
GLUCOSE SERPL-MCNC: 107 MG/DL (ref 65–140)
GLUCOSE SERPL-MCNC: 90 MG/DL (ref 65–140)
HCT VFR BLD AUTO: 33.3 % (ref 36.5–49.3)
HGB BLD-MCNC: 11.6 G/DL (ref 12–17)
IMM GRANULOCYTES # BLD AUTO: 0.01 THOUSAND/UL (ref 0–0.2)
IMM GRANULOCYTES NFR BLD AUTO: 0 % (ref 0–2)
LYMPHOCYTES # BLD AUTO: 1.07 THOUSANDS/ÂΜL (ref 0.6–4.47)
LYMPHOCYTES NFR BLD AUTO: 19 % (ref 14–44)
MCH RBC QN AUTO: 34.8 PG (ref 26.8–34.3)
MCHC RBC AUTO-ENTMCNC: 34.8 G/DL (ref 31.4–37.4)
MCV RBC AUTO: 100 FL (ref 82–98)
MONOCYTES # BLD AUTO: 0.71 THOUSAND/ÂΜL (ref 0.17–1.22)
MONOCYTES NFR BLD AUTO: 12 % (ref 4–12)
NEUTROPHILS # BLD AUTO: 3.87 THOUSANDS/ÂΜL (ref 1.85–7.62)
NEUTS SEG NFR BLD AUTO: 67 % (ref 43–75)
NRBC BLD AUTO-RTO: 0 /100 WBCS
PLATELET # BLD AUTO: 168 THOUSANDS/UL (ref 149–390)
PMV BLD AUTO: 8.9 FL (ref 8.9–12.7)
POTASSIUM SERPL-SCNC: 4.1 MMOL/L (ref 3.5–5.3)
POTASSIUM SERPL-SCNC: 4.1 MMOL/L (ref 3.5–5.3)
POTASSIUM SERPL-SCNC: 4.2 MMOL/L (ref 3.5–5.3)
RBC # BLD AUTO: 3.33 MILLION/UL (ref 3.88–5.62)
SODIUM SERPL-SCNC: 126 MMOL/L (ref 135–147)
SODIUM SERPL-SCNC: 126 MMOL/L (ref 135–147)
SODIUM SERPL-SCNC: 127 MMOL/L (ref 135–147)
WBC # BLD AUTO: 5.78 THOUSAND/UL (ref 4.31–10.16)

## 2023-02-28 RX ORDER — FOLIC ACID 1 MG/1
1 TABLET ORAL DAILY
Status: DISCONTINUED | OUTPATIENT
Start: 2023-03-01 | End: 2023-03-01 | Stop reason: HOSPADM

## 2023-02-28 RX ORDER — LANOLIN ALCOHOL/MO/W.PET/CERES
100 CREAM (GRAM) TOPICAL DAILY
Status: DISCONTINUED | OUTPATIENT
Start: 2023-03-01 | End: 2023-03-01 | Stop reason: HOSPADM

## 2023-02-28 RX ADMIN — CHOLECALCIFEROL TAB 25 MCG (1000 UNIT) 1000 UNITS: 25 TAB at 08:33

## 2023-02-28 RX ADMIN — MULTIPLE VITAMINS W/ MINERALS TAB 1 TABLET: TAB at 08:33

## 2023-02-28 RX ADMIN — MIDODRINE HYDROCHLORIDE 5 MG: 5 TABLET ORAL at 16:07

## 2023-02-28 RX ADMIN — COSYNTROPIN 0.25 MG: 0.25 INJECTION, POWDER, LYOPHILIZED, FOR SOLUTION INTRAVENOUS at 08:33

## 2023-02-28 RX ADMIN — MIDODRINE HYDROCHLORIDE 5 MG: 5 TABLET ORAL at 06:02

## 2023-02-28 RX ADMIN — DIPHENHYDRAMINE HYDROCHLORIDE 25 MG: 25 TABLET ORAL at 06:04

## 2023-02-28 RX ADMIN — CEFTRIAXONE 1000 MG: 1 INJECTION, SOLUTION INTRAVENOUS at 14:47

## 2023-02-28 RX ADMIN — LEVETIRACETAM 750 MG: 250 TABLET, FILM COATED ORAL at 08:33

## 2023-02-28 RX ADMIN — LORAZEPAM 0.5 MG: 0.5 TABLET ORAL at 21:14

## 2023-02-28 RX ADMIN — THIAMINE HYDROCHLORIDE: 100 INJECTION, SOLUTION INTRAMUSCULAR; INTRAVENOUS at 12:45

## 2023-02-28 RX ADMIN — Medication 100 MCG: at 08:33

## 2023-02-28 RX ADMIN — DIPHENHYDRAMINE HYDROCHLORIDE 25 MG: 25 TABLET ORAL at 21:14

## 2023-02-28 RX ADMIN — SODIUM CHLORIDE 75 ML/HR: 0.9 INJECTION, SOLUTION INTRAVENOUS at 21:19

## 2023-02-28 RX ADMIN — MIDODRINE HYDROCHLORIDE 5 MG: 5 TABLET ORAL at 12:45

## 2023-02-28 RX ADMIN — SODIUM CHLORIDE 75 ML/HR: 0.9 INJECTION, SOLUTION INTRAVENOUS at 03:47

## 2023-02-28 RX ADMIN — LEVETIRACETAM 750 MG: 250 TABLET, FILM COATED ORAL at 21:14

## 2023-02-28 RX ADMIN — ENOXAPARIN SODIUM 40 MG: 40 INJECTION SUBCUTANEOUS at 16:07

## 2023-02-28 RX ADMIN — DIPHENHYDRAMINE HYDROCHLORIDE 25 MG: 25 TABLET ORAL at 12:50

## 2023-02-28 NOTE — PROGRESS NOTES
[address] [facility] 160[facility]  Progress Note - Sunshine Cornelius 1965, 65 y o  male MRN: [id number]  Unit/Bed#: 003-33 Encounter: [id number]  Primary Care Provider: Livia Rae DO   Date and time admitted to hospital: 2/25/2023  8:15 AM    * Hyponatremia  Assessment & Plan  · Asymptomatic hypo-osmolar hyponatremia  · Nephrology's input appreciated   · Corrected too quickly due to requiring a total of 1000 ml of NSS IV fluids to treat hypotension  · Fluid restriction of 1200 ml/24 hours  · No sign of malignancy on CT scan of the chest/abdomen/pelvis with IV contrast completed on 02/25/2023  · Endocrinology input appreciated, undergoing ACTH stimulating test, will await additional input  · Appreciate nephrologist input, patient was continued on IV normal saline at 75 mill per hour with apparent improvement in sodium levels  · BMP will be monitored every 12 hours    Results from last 7 days   Lab Units 02/28/23  0852 02/28/23  0022 02/27/23 2050   SODIUM mmol/L 126* 126* 122*   POTASSIUM mmol/L 4 1 4 1 4 4   CHLORIDE mmol/L 95* 97 94*   CO2 mmol/L 26 22 23   BUN mg/dL 12 17 18   CREATININE mg/dL 0 71 0 80 0 89   CALCIUM mg/dL 9 0 8 7 8 5         Acute cystitis with hematuria  Assessment & Plan  · <100K colonies Klebsiella pneumonia, >10K Proteus mirabilis  · Continue ceftriaxone 1000 mg IV every 24 hours (day #4/5)  • Outpatient Urology evaluation    History of alcohol abuse  Assessment & Plan  · The patient reported that in the last 10 months he only drinks non-alcoholic beer, which he currently drinks 3 per day    · No evidence of withdrawal   · Discontinue CIWA protocol    Seizure disorder [facility])  Assessment & Plan  · Recent elevated keppra level on 02/23/2023  · Repeat level pending  · For now continue decreased keppra dosing at 750 mg PO every 12 hours  · Outpatient follow-up with Neurology       Latest Reference Range & Units 02/23/23 09:17   LEVETIRACETA (KEPPRA) 10 0 - 40 0 ug/mL 44 1 (H)   (H): Data is abnormally high          VTE Pharmacologic Prophylaxis: VTE Score: 3 Moderate Risk (Score 3-4) - Pharmacological DVT Prophylaxis Ordered: enoxaparin (Lovenox)  Patient Centered Rounds: I performed bedside rounds with nursing staff today  Discussions with Specialists or Other Care Team Provider: Podiatry    Education and Discussions with Family / Patient: Patient  Total Time Spent on Date of Encounter in care of patient: 35 minutes This time was spent on one or more of the following: performing physical exam; counseling and coordination of care; obtaining or reviewing history; documenting in the medical record; reviewing/ordering tests, medications or procedures; communicating with other healthcare professionals and discussing with patient's family/caregivers  Current Length of Stay: 3 day(s)  Current Patient Status: Inpatient   Certification Statement: The patient will continue to require additional inpatient hospital stay due to Need for close monitoring  Discharge Plan: Anticipate discharge tomorrow to home  Code Status: Level 1 - Full Code    Subjective:   Patient seen and examined  Reports feeling well and hopes to be discharged home soon  Objective:     Vitals:   Temp (24hrs), Av 8 °F (36 6 °C), Min:97 5 °F (36 4 °C), Max:98 1 °F (36 7 °C)    Temp:  [97 5 °F (36 4 °C)-98 1 °F (36 7 °C)] 97 9 °F (36 6 °C)  HR:  [67-78] 78  Resp:  [16-20] 18  BP: (103-117)/(66-72) 106/69  SpO2:  [94 %-97 %] 95 %  Body mass index is 20 01 kg/m²  Input and Output Summary (last 24 hours): Intake/Output Summary (Last 24 hours) at 2023 1606  Last data filed at 2023 1244  Gross per 24 hour   Intake 1850 ml   Output --   Net 1850 ml       Physical Exam:   Physical Exam  Constitutional:       General: He is not in acute distress  Appearance: He is cachectic  HENT:      Head: Normocephalic and atraumatic     Eyes:      Conjunctiva/sclera: Conjunctivae normal    Cardiovascular:      Rate and Rhythm: Normal rate and regular rhythm  Heart sounds: No murmur heard  Pulmonary:      Effort: No respiratory distress  Breath sounds: No wheezing or rales  Abdominal:      General: There is no distension  Tenderness: There is no abdominal tenderness  There is no guarding  Musculoskeletal:      Right lower leg: No edema  Left lower leg: No edema  Skin:     General: Skin is warm and dry  Neurological:      Mental Status: He is oriented to person, place, and time  Psychiatric:         Mood and Affect: Mood normal           Additional Data:     Labs:  Results from last 7 days   Lab Units 02/28/23  0852   WBC Thousand/uL 5 78   HEMOGLOBIN g/dL 11 6*   HEMATOCRIT % 33 3*   PLATELETS Thousands/uL 168   NEUTROS PCT % 67   LYMPHS PCT % 19   MONOS PCT % 12   EOS PCT % 1     Results from last 7 days   Lab Units 02/28/23  0852 02/27/23  1404 02/27/23  0512   SODIUM mmol/L 126*   < > 123*   POTASSIUM mmol/L 4 1   < > 4 2   CHLORIDE mmol/L 95*   < > 93*   CO2 mmol/L 26   < > 25   BUN mg/dL 12   < > 22   CREATININE mg/dL 0 71   < > 0 82   ANION GAP mmol/L 5   < > 5   CALCIUM mg/dL 9 0   < > 8 8   ALBUMIN g/dL  --   --  3 8   TOTAL BILIRUBIN mg/dL  --   --  0 35   ALK PHOS U/L  --   --  108*   ALT U/L  --   --  8   AST U/L  --   --  19   GLUCOSE RANDOM mg/dL 107   < > 85    < > = values in this interval not displayed               Results from last 7 days   Lab Units 02/26/23  0617   HEMOGLOBIN A1C % 5 0     Results from last 7 days   Lab Units 02/27/23  0512 02/26/23  0617   PROCALCITONIN ng/ml 0 06 0 05       Lines/Drains:  Invasive Devices     Peripheral Intravenous Line  Duration           Peripheral IV 02/27/23 Right;Upper;Ventral (anterior) Arm 1 day                  Imaging: no new     Recent Cultures (last 7 days):   Results from last 7 days   Lab Units 02/25/23  1112   URINE CULTURE  >100,000 cfu/ml Klebsiella pneumoniae*  <10,000 cfu/ml Proteus species*  >100,000 cfu/ml Klebsiella pneumoniae*       Last 24 Hours Medication List:   Current Facility-Administered Medications   Medication Dose Route Frequency Provider Last Rate   • cefTRIAXone  1,000 mg Intravenous Q24H Francis Grater Peabody, DO 1,000 mg (02/28/23 1447)   • cholecalciferol  1,000 Units Oral Daily Francis Grater Peabody, DO     • cyanocobalamin  100 mcg Oral Daily Francis Grater Brad, DO     • Diclofenac Sodium  2 g Topical 4x Daily PRN Francis Grater Peabody, DO     • diphenhydrAMINE  25 mg Oral Q6H PRN Francis Grater Brad, DO     • docusate sodium  100 mg Oral BID PRN Francis Grater Peabody, DO     • enoxaparin  40 mg Subcutaneous Q24H Francis Grater Peabody, DO     • [START ON 7/4/2142] folic acid  1 mg Oral Daily Dilma Link MD     • levETIRAcetam  750 mg Oral Q12H [facility] Francis Grater Brad, DO     • LORazepam  0 5 mg Oral HS Francis Grater Brad, DO     • midodrine  5 mg Oral TID AC Francis Grater Brad, DO     • multivitamin-minerals  1 tablet Oral Daily Francis Grater Brad, DO     • nicotine  14 mg Transdermal Daily Francis Grater Brad, DO     • ondansetron  4 mg Intravenous Q6H PRN Francis Grater Brad, DO     • sodium chloride  75 mL/hr Intravenous Continuous Navarro Harvey PA-C 75 mL/hr (02/28/23 7)   • [START ON 3/1/2023] thiamine  100 mg Oral Daily Lorraine Corrales MD          Today, Patient Was Seen By: Lorraine Corrales MD    **Please Note: This note may have been constructed using a voice recognition system  **

## 2023-02-28 NOTE — ASSESSMENT & PLAN NOTE
· <100K colonies Klebsiella pneumonia, >10K Proteus mirabilis  · Continue ceftriaxone 1000 mg IV every 24 hours (day #4/5)  • Outpatient Urology evaluation

## 2023-02-28 NOTE — PROGRESS NOTES
Progress Note - Nephrology   Jocelin Feliz 65 y o  male MRN: [id number]  Unit/Bed#: 775-78 Encounter: [id number]    A/P:  1  Hyponatremia, moderate, chronic, hypoosmolar, asymptomatic at present  Slow improvement with NSS to 126 from 123  Goal na improvement 4-6 meq/24 hr   Etiology TBD, working up for potential AI  Previously attributed to cirrhosis and potential SIADH  Awaiting endocrine comment on cosyntropin testing  For now, continue NSS  Check chem q12      2  Hypotension, continue midodrine  3  Alcoholic cirrhosis of liver without ascites, appears compensated  4  UTI, > 1000 K colonies GNR  Rocephin per primary  Follow up reason for today's visit:     Hyponatremia      Subjective:   Patient seen and examined today  Denies cp/sob/n/v/d  A complete 10 point review of systems was performed and is otherwise negative  Objective:     Vitals: Blood pressure 107/69, pulse 67, temperature 97 8 °F (36 6 °C), resp  rate 18, height 5' 4" (1 626 m), weight 52 9 kg (116 lb 9 6 oz), SpO2 97 %  ,Body mass index is 20 01 kg/m²  Weight (last 2 days)     Date/Time Weight    02/28/23 0600 52 9 (116 6)    02/27/23 0539 51 7 (113 98)            Intake/Output Summary (Last 24 hours) at 2/28/2023 1418  Last data filed at 2/28/2023 1244  Gross per 24 hour   Intake 1850 ml   Output --   Net 1850 ml     I/O last 3 completed shifts:   In: 2310 [P O :1360; I V :950]  Out: -          Physical Exam: /69   Pulse 67   Temp 97 8 °F (36 6 °C)   Resp 18   Ht 5' 4" (1 626 m)   Wt 52 9 kg (116 lb 9 6 oz)   SpO2 97%   BMI 20 01 kg/m²     General Appearance:    Alert, cooperative, no distress, appears stated age   Head:    Normocephalic, without obvious abnormality, atraumatic   Eyes:    Conjunctiva/corneas clear   Ears:    Normal external ears   Nose:   Nares normal, septum midline, mucosa normal, no drainage    or sinus tenderness   Throat:   Lips, mucosa, and tongue normal; teeth and gums normal   Neck:    Back: Lungs:     Clear to auscultation bilaterally, respirations unlabored   Chest wall:    No tenderness or deformity   Heart:    Regular rate and rhythm, S1 and S2 normal, no murmur, rub   or gallop   Abdomen:     Soft, non-tender, bowel sounds active   Extremities:   Extremities normal, atraumatic, no cyanosis or edema   Skin:   Skin color, texture, turgor normal, no rashes or lesions   Lymph nodes:   Cervical normal   Neurologic:   CNII-XII intact            Lab, Imaging and other studies: I have personally reviewed pertinent labs  CBC:   Lab Results   Component Value Date    WBC 5 78 02/28/2023    HGB 11 6 (L) 02/28/2023    HCT 33 3 (L) 02/28/2023     (H) 02/28/2023     02/28/2023    MCH 34 8 (H) 02/28/2023    MCHC 34 8 02/28/2023    RDW 13 3 02/28/2023    MPV 8 9 02/28/2023    NRBC 0 02/28/2023     CMP:   Lab Results   Component Value Date    K 4 1 02/28/2023    CL 95 (L) 02/28/2023    CO2 26 02/28/2023    BUN 12 02/28/2023    CREATININE 0 71 02/28/2023    CALCIUM 9 0 02/28/2023    EGFR 104 02/28/2023         Results from last 7 days   Lab Units 02/28/23  0852 02/28/23  0022 02/27/23  2050 02/27/23  1404 02/27/23  0512 02/26/23  1341 02/26/23  0617 02/25/23  1814 02/25/23  0830   POTASSIUM mmol/L 4 1 4 1 4 4   < > 4 2   < > 4 6   < > 4 6   CHLORIDE mmol/L 95* 97 94*   < > 93*   < > 92*   < > 78*   CO2 mmol/L 26 22 23   < > 25   < > 23   < > 27   BUN mg/dL 12 17 18   < > 22   < > 22   < > 14   CREATININE mg/dL 0 71 0 80 0 89   < > 0 82   < > 0 92   < > 0 83   CALCIUM mg/dL 9 0 8 7 8 5   < > 8 8   < > 9 3   < > 9 5   ALK PHOS U/L  --   --   --   --  108*  --  88  --  96   ALT U/L  --   --   --   --  8  --  8  --  9   AST U/L  --   --   --   --  19  --  19  --  22    < > = values in this interval not displayed           Phosphorus: No results found for: PHOS  Magnesium: No results found for: MG  Urinalysis: No results found for: Aashish Donahue, SPECGRAV, PHUR, LEUKOCYTESUR, NITRITE, PROTEINUA, Adelina Listen, BILIRUBINUR, BLOODU  Ionized Calcium: No results found for: CAION  Coagulation: No results found for: PT, INR, APTT  Troponin: No results found for: TROPONINI  ABG: No results found for: PHART, WCY7EXZ, PO2ART, IHL6RBI, P7JYMBEK, BEART, SOURCE  Radiology review:     IMAGING  No results found  Current Facility-Administered Medications   Medication Dose Route Frequency   • cefTRIAXone (ROCEPHIN) IVPB (premix in dextrose) 1,000 mg 50 mL  1,000 mg Intravenous Q24H   • cholecalciferol (VITAMIN D3) tablet 1,000 Units  1,000 Units Oral Daily   • cyanocobalamin (VITAMIN B-12) tablet 100 mcg  100 mcg Oral Daily   • Diclofenac Sodium (VOLTAREN) 1 % topical gel 2 g  2 g Topical 4x Daily PRN   • diphenhydrAMINE (BENADRYL) tablet 25 mg  25 mg Oral Q6H PRN   • docusate sodium (COLACE) capsule 100 mg  100 mg Oral BID PRN   • enoxaparin (LOVENOX) subcutaneous injection 40 mg  40 mg Subcutaneous D36C   • folic acid 1 mg, thiamine (VITAMIN B1) 100 mg in sodium chloride 0 9 % 100 mL IV piggyback   Intravenous Daily   • levETIRAcetam (KEPPRA) tablet 750 mg  750 mg Oral Q12H [facility]   • LORazepam (ATIVAN) tablet 0 5 mg  0 5 mg Oral HS   • midodrine (PROAMATINE) tablet 5 mg  5 mg Oral TID AC   • multivitamin-minerals (CENTRUM) tablet 1 tablet  1 tablet Oral Daily   • nicotine (NICODERM CQ) 14 mg/24hr TD 24 hr patch 14 mg  14 mg Transdermal Daily   • ondansetron (ZOFRAN) injection 4 mg  4 mg Intravenous Q6H PRN   • sodium chloride 0 9 % infusion  75 mL/hr Intravenous Continuous     Medications Discontinued During This Encounter   Medication Reason   • levETIRAcetam (KEPPRA) tablet 1,000 mg        Aylin Murguia,       This progress note was produced in part using a dictation device which may document imprecise wording from author's original intent

## 2023-02-28 NOTE — ASSESSMENT & PLAN NOTE
· The patient reported that in the last 10 months he only drinks non-alcoholic beer, which he currently drinks 3 per day    · No evidence of withdrawal   · Discontinue CIWA protocol

## 2023-02-28 NOTE — PLAN OF CARE
Problem: PAIN - ADULT  Goal: Verbalizes/displays adequate comfort level or baseline comfort level  Description: Interventions:  - Encourage patient to monitor pain and request assistance  - Assess pain using appropriate pain scale  - Administer analgesics based on type and severity of pain and evaluate response  - Implement non-pharmacological measures as appropriate and evaluate response  - Consider cultural and social influences on pain and pain management  - Notify physician/advanced practitioner if interventions unsuccessful or patient reports new pain  Outcome: Progressing     Problem: SAFETY ADULT  Goal: Maintain or return to baseline ADL function  Description: INTERVENTIONS:  -  Assess patient's ability to carry out ADLs; assess patient's baseline for ADL function and identify physical deficits which impact ability to perform ADLs (bathing, care of mouth/teeth, toileting, grooming, dressing, etc )  - Assess/evaluate cause of self-care deficits   - Assess range of motion  - Assess patient's mobility; develop plan if impaired  - Assess patient's need for assistive devices and provide as appropriate  - Encourage maximum independence but intervene and supervise when necessary  - Involve family in performance of ADLs  - Assess for home care needs following discharge   - Consider OT consult to assist with ADL evaluation and planning for discharge  - Provide patient education as appropriate  Outcome: Progressing  Goal: Maintains/Returns to pre admission functional level  Description: INTERVENTIONS:  - Perform BMAT or MOVE assessment daily    - Set and communicate daily mobility goal to care team and patient/family/caregiver  - Collaborate with rehabilitation services on mobility goals if consulted  - Perform Range of Motion 3 times a day  - Reposition patient every 2 hours    - Dangle patient 3 times a day  - Stand patient 3 times a day  - Ambulate patient 3 times a day  - Out of bed to chair 3 times a day   - Out of bed for meals 3 times a day  - Out of bed for toileting  - Record patient progress and toleration of activity level   Outcome: Progressing  Goal: Patient will remain free of falls  Description: INTERVENTIONS:  - Educate patient/family on patient safety including physical limitations  - Instruct patient to call for assistance with activity   - Consult OT/PT to assist with strengthening/mobility   - Keep Call bell within reach  - Keep bed low and locked with side rails adjusted as appropriate  - Keep care items and personal belongings within reach  - Initiate and maintain comfort rounds  - Make Fall Risk Sign visible to staff  - Offer Toileting every 2 Hours, in advance of need  - Initiate/Maintain bed alarm  - Apply yellow socks and bracelet for high fall risk patients  - Consider moving patient to room near nurses station  Outcome: Progressing     Problem: DISCHARGE PLANNING  Goal: Discharge to home or other facility with appropriate resources  Description: INTERVENTIONS:  - Identify barriers to discharge w/patient and caregiver  - Arrange for needed discharge resources and transportation as appropriate  - Identify discharge learning needs (meds, wound care, etc )  - Arrange for interpretive services to assist at discharge as needed  - Refer to Case Management Department for coordinating discharge planning if the patient needs post-hospital services based on physician/advanced practitioner order or complex needs related to functional status, cognitive ability, or social support system  Outcome: Progressing     Problem: HEMATOLOGIC - ADULT  Goal: Maintains hematologic stability  Description: INTERVENTIONS  - Assess for signs and symptoms of bleeding or hemorrhage  - Monitor labs  - Administer supportive blood products/factors as ordered and appropriate  Outcome: Progressing     Problem: METABOLIC, FLUID AND ELECTROLYTES - ADULT  Goal: Electrolytes maintained within normal limits  Description: INTERVENTIONS:  - Monitor labs and assess patient for signs and symptoms of electrolyte imbalances  - Administer electrolyte replacement as ordered  - Monitor response to electrolyte replacements, including repeat lab results as appropriate  - Instruct patient on fluid and nutrition as appropriate  Outcome: Progressing  Goal: Fluid balance maintained  Description: INTERVENTIONS:  - Monitor labs   - Monitor I/O and WT  - Instruct patient on fluid and nutrition as appropriate  - Assess for signs & symptoms of volume excess or deficit  Outcome: Progressing

## 2023-02-28 NOTE — QUICK NOTE
Endocrinology quick note:    Chart reviewed  Patient not seen or examined by me personally  Pre-stimulation cortisol 13 5 ug/dL  Awaiting 30- and 60-min post-stimulation levels   Will follow

## 2023-02-28 NOTE — ASSESSMENT & PLAN NOTE
· Asymptomatic hypo-osmolar hyponatremia  · Nephrology's input appreciated   · Corrected too quickly due to requiring a total of 1000 ml of NSS IV fluids to treat hypotension  · Fluid restriction of 1200 ml/24 hours  · No sign of malignancy on CT scan of the chest/abdomen/pelvis with IV contrast completed on 02/25/2023  · Endocrinology input appreciated, undergoing ACTH stimulating test, will await additional input  · Appreciate nephrologist input, patient was continued on IV normal saline at 75 mill per hour with apparent improvement in sodium levels  · BMP will be monitored every 12 hours    Results from last 7 days   Lab Units 02/28/23  0852 02/28/23  0022 02/27/23 2050   SODIUM mmol/L 126* 126* 122*   POTASSIUM mmol/L 4 1 4 1 4 4   CHLORIDE mmol/L 95* 97 94*   CO2 mmol/L 26 22 23   BUN mg/dL 12 17 18   CREATININE mg/dL 0 71 0 80 0 89   CALCIUM mg/dL 9 0 8 7 8 5

## 2023-03-01 VITALS
OXYGEN SATURATION: 99 % | DIASTOLIC BLOOD PRESSURE: 75 MMHG | SYSTOLIC BLOOD PRESSURE: 115 MMHG | RESPIRATION RATE: 18 BRPM | BODY MASS INDEX: 20.55 KG/M2 | HEART RATE: 83 BPM | WEIGHT: 120.4 LBS | HEIGHT: 64 IN | TEMPERATURE: 97.9 F

## 2023-03-01 LAB
ACTH PLAS-MCNC: 22.7 PG/ML (ref 7.2–63.3)
ANION GAP SERPL CALCULATED.3IONS-SCNC: 6 MMOL/L (ref 4–13)
ATRIAL RATE: 83 BPM
BUN SERPL-MCNC: 17 MG/DL (ref 5–25)
CALCIUM SERPL-MCNC: 8.7 MG/DL (ref 8.4–10.2)
CHLORIDE SERPL-SCNC: 100 MMOL/L (ref 96–108)
CO2 SERPL-SCNC: 23 MMOL/L (ref 21–32)
CORTIS SERPL-MCNC: 24.6 UG/DL
CREAT SERPL-MCNC: 0.76 MG/DL (ref 0.6–1.3)
GFR SERPL CREATININE-BSD FRML MDRD: 101 ML/MIN/1.73SQ M
GLUCOSE SERPL-MCNC: 93 MG/DL (ref 65–140)
LEVETIRACETAM SERPL-MCNC: 53.4 UG/ML (ref 10–40)
P AXIS: 49 DEGREES
POTASSIUM SERPL-SCNC: 4 MMOL/L (ref 3.5–5.3)
PR INTERVAL: 160 MS
QRS AXIS: -52 DEGREES
QRSD INTERVAL: 88 MS
QT INTERVAL: 380 MS
QTC INTERVAL: 446 MS
SODIUM SERPL-SCNC: 129 MMOL/L (ref 135–147)
T WAVE AXIS: 47 DEGREES
VENTRICULAR RATE: 83 BPM

## 2023-03-01 RX ORDER — MIDODRINE HYDROCHLORIDE 5 MG/1
5 TABLET ORAL
Qty: 90 TABLET | Refills: 0 | Status: SHIPPED | OUTPATIENT
Start: 2023-03-01

## 2023-03-01 RX ORDER — NICOTINE 21 MG/24HR
1 PATCH, TRANSDERMAL 24 HOURS TRANSDERMAL DAILY
Qty: 28 PATCH | Refills: 0 | Status: SHIPPED | OUTPATIENT
Start: 2023-03-02

## 2023-03-01 RX ADMIN — Medication 100 MCG: at 08:32

## 2023-03-01 RX ADMIN — MULTIPLE VITAMINS W/ MINERALS TAB 1 TABLET: TAB at 08:32

## 2023-03-01 RX ADMIN — LEVETIRACETAM 750 MG: 250 TABLET, FILM COATED ORAL at 08:32

## 2023-03-01 RX ADMIN — CHOLECALCIFEROL TAB 25 MCG (1000 UNIT) 1000 UNITS: 25 TAB at 08:32

## 2023-03-01 RX ADMIN — THIAMINE HCL TAB 100 MG 100 MG: 100 TAB at 08:33

## 2023-03-01 RX ADMIN — FOLIC ACID 1 MG: 1 TABLET ORAL at 08:32

## 2023-03-01 RX ADMIN — DIPHENHYDRAMINE HYDROCHLORIDE 25 MG: 25 TABLET ORAL at 08:33

## 2023-03-01 RX ADMIN — MIDODRINE HYDROCHLORIDE 5 MG: 5 TABLET ORAL at 08:32

## 2023-03-01 NOTE — CASE MANAGEMENT
Case Management Discharge Planning Note    Patient name Jocelin Feliz  Location [address] [id number] MRN [id number]  : 1965 Date 3/1/2023       Current Admission Date: 2023  Current Admission Diagnosis:Hyponatremia   Patient Active Problem List    Diagnosis Date Noted   • Hypotension 2023   • History of alcohol abuse 2023   • Hypoglycemia 2023   • Acute cystitis with hematuria 2023   • Insomnia 2023   • Moderate protein-calorie malnutrition ([address] ) 2023   • Esophageal abnormality 2023   • Abnormal CXR 2023   • Osteoporosis with current pathological fracture 2022   • Chronic anemia 2022   • Clavicular fracture 2022   • Low serum cortisol level 2022   • Hypocalcemia 2022   • Elevated d-dimer 2022   • COVID-19 virus infection 2022   • Alcohol use 2022   • Thoracic compression fracture ([address] ) 2022   • Aortic ectasia ([address] ) 2022   • Rib fractures 2022   • Seizure disorder ([address] ) 2022   • Hypoxia 2022   • Traore's esophagus 2022   • Ambulatory dysfunction 2021   • Current every day smoker 2021   • Fall 2021   • Hx of duodenal ulcer 2021   • Neck pain 2021   • Acute on chronic pancreatitis ([address] ) 2020   • Pancreatic pseudocyst 2020   • COPD (chronic obstructive pulmonary disease) ([address] ) 2020   • Ileus ([address] ) 2020   • Abnormal EKG 2020   • Lesion of spleen 2020   • Left anterior fascicular block 2020   • Constipation 2020   • Transaminitis 2020   • Celiac artery stenosis ([address] ) 2020   • Pancreatic mass 2020   • Pancytopenia ([address] ) 2020   • Tobacco use 2020   • Traore esophagus    • Duodenal ulcer 2019   • Tubular adenoma 2019   • Iron deficiency anemia due to chronic blood loss 10/22/2019   • Closed fracture of left olecranon process, initial encounter 2019   • Thrombocytopenia ([facility] ) 21/26/7146   • Alcoholic cirrhosis of liver without ascites ([facility] ) 08/21/2018   • Seizure-like activity ([place] [address] ) 05/22/2018   • Diarrhea 05/22/2018   • Abnormality of pancreatic duct 11/02/2017   • Hx of seizure disorder 10/12/2017   • Incisional hernia 10/08/2017   • Continuous chronic alcoholism ([facility] ) 10/05/2017   • T6 vertebral fracture ([place] [address] ) 09/21/2017   • Neuropathy involving both lower extremities 08/15/2017   • Hyponatremia 07/29/2017   • Hypokalemia 07/29/2017   • Malnutrition of moderate degree ([place] [address] ) 05/18/2017   • Generalized weakness 05/17/2017   • Bladder wall thickening 03/09/2017   • Hypophosphatemia 03/09/2017   • Urinary retention 21/06/2595   • Alcoholic hepatitis without ascites 03/07/2017   • Vitamin D deficiency 03/07/2017   • Iron deficiency 03/07/2017   • Depression 02/08/2017   • Elevated alkaline phosphatase level 01/14/2017   • Hepatomegaly 01/12/2017   • Hepatic steatosis 01/12/2017   • Hypomagnesemia 01/12/2017   • Chronic hyponatremia 01/11/2017   • Dietary folate deficiency anemia 01/11/2017   • Ventral hernia without obstruction or gangrene 01/11/2017   • Abdominal wound dehiscence 12/15/2016   • Microcytic anemia 06/20/2016   • Allergic rhinitis 06/07/2016   • Tobacco abuse 05/22/2016   • Essential hypertension 05/22/2016   • H/O suicide attempt 05/22/2016   • H/O cervical spine surgery 05/22/2016   • Left foot drop 10/31/2014   • Peripheral neuropathy 10/31/2014   • Cervical disc disorder with myelopathy 09/25/2014   • Lumbar radiculopathy 09/25/2014   • Cervical spinal stenosis 07/30/2014      LOS (days): 4  Geometric Mean LOS (GMLOS) (days):   Days to GMLOS:     OBJECTIVE:  Risk of Unplanned Readmission Score: 19 27         Current admission status: Inpatient   Preferred Pharmacy:   [facility] [id number]  Phone: [phone] Fax: [phone]    Primary Care Provider: Diana Concepcion DO    Primary Insurance: [employer] [facility]  Secondary Insurance:     [address] [facility] discharging home  No discharge needs noted  Pt already has appts set up for o/p therapy  Nurse to review AVS, all follow up providers listed

## 2023-03-01 NOTE — PROGRESS NOTES
Progress Note - Nephrology   Shital Lab 65 y o  male MRN: [id number]  Unit/Bed#: 823-20 Encounter: [id number]    Assessment and Plan    1  Hyponatremia  Serum sodium improved to goal 129 mmol/L  Suspect etiology was hypovolemic in the setting of loop diuresis  Discontinue IV fluids and discharge on 1 5 L fluid restriction to limit free water intake  Hold bumetanide 1 mg twice daily at discharge  Would recommend weight-based dosing for 2 pound weight gain  2  Hypotension  Prehospital lisinopril on hold  Currently requiring midodrine 5 mg 3 times daily  Can discharge on this with outpatient follow-up within 1 week  May need dose adjustment or discontinuation  3  Alcoholic cirrhosis of liver without ascites  Appears compensated  Diuretics currently on hold in the setting of hypovolemic hyponatremia and hypotension  Close outpatient monitoring of volume status recommended  4  Urinary tract infection    On ceftriaxone per hospitalist     Follow up reason for today's visit:     Hyponatremia    Patient Active Problem List   Diagnosis   • Tobacco abuse   • Essential hypertension   • H/O suicide attempt   • H/O cervical spine surgery   • Chronic hyponatremia   • Dietary folate deficiency anemia   • Ventral hernia without obstruction or gangrene   • Hepatomegaly   • Hepatic steatosis   • Hypomagnesemia   • Elevated alkaline phosphatase level   • Alcoholic hepatitis without ascites   • Vitamin D deficiency   • Iron deficiency   • Urinary retention   • Bladder wall thickening   • Hypophosphatemia   • Generalized weakness   • Malnutrition of moderate degree (HCC)   • Hyponatremia   • Hypokalemia   • Continuous chronic alcoholism (HCC)   • Incisional hernia   • Hx of seizure disorder   • Seizure-like activity (HCC)   • Diarrhea   • Abnormality of pancreatic duct   • Allergic rhinitis   • Microcytic anemia   • Abdominal wound dehiscence   • Cervical disc disorder with myelopathy   • Cervical spinal stenosis • Depression   • Left foot drop   • Lumbar radiculopathy   • Neuropathy involving both lower extremities   • Peripheral neuropathy   • T6 vertebral fracture (HCC)   • Alcoholic cirrhosis of liver without ascites (HCC)   • Thrombocytopenia (HCC)   • Closed fracture of left olecranon process, initial encounter   • Iron deficiency anemia due to chronic blood loss   • Duodenal ulcer   • Tubular adenoma   • Traore esophagus   • Celiac artery stenosis (HCC)   • Pancreatic mass   • Pancytopenia (HCC)   • Tobacco use   • Constipation   • Transaminitis   • Lesion of spleen   • Left anterior fascicular block   • Abnormal EKG   • Acute on chronic pancreatitis [facility])   • Pancreatic pseudocyst   • COPD (chronic obstructive pulmonary disease) (HCC)   • Ileus (HCC)   • Ambulatory dysfunction   • Current every day smoker   • Fall   • Hx of duodenal ulcer   • Neck pain   • Alcohol use   • Thoracic compression fracture (HCC)   • Aortic ectasia (HCC)   • Rib fractures   • Seizure disorder (HCC)   • Hypoxia   • Traore's esophagus   • Elevated d-dimer   • COVID-19 virus infection   • Hypocalcemia   • Clavicular fracture   • Low serum cortisol level   • Chronic anemia   • Osteoporosis with current pathological fracture   • Abnormal CXR   • Moderate protein-calorie malnutrition (HCC)   • Esophageal abnormality   • Insomnia   • History of alcohol abuse   • Hypoglycemia   • Acute cystitis with hematuria   • Hypotension         Subjective:   Denies complaints  Wants to be discharged  A complete 10 point review of systems was performed and is otherwise negative  Objective:     Vitals: Blood pressure 115/75, pulse 83, temperature 97 9 °F (36 6 °C), resp  rate 18, height 5' 4" (1 626 m), weight 54 6 kg (120 lb 6 4 oz), SpO2 99 %  ,Body mass index is 20 67 kg/m²      Weight (last 2 days)     Date/Time Weight    03/01/23 0559 54 6 (120 4)    02/28/23 0600 52 9 (116 6)    02/27/23 0539 51 7 (113 98)            Intake/Output Summary (Last 24 hours) at 3/1/2023 1051  Last data filed at 2/28/2023 1244  Gross per 24 hour   Intake 240 ml   Output --   Net 240 ml     I/O last 3 completed shifts: In: 1610 [P O :660; I V :950]  Out: -          Physical Exam: /75   Pulse 83   Temp 97 9 °F (36 6 °C)   Resp 18   Ht 5' 4" (1 626 m)   Wt 54 6 kg (120 lb 6 4 oz)   SpO2 99%   BMI 20 67 kg/m²     General Appearance:    No acute distress  Cooperative  Appears stated age  Head:    Normocephalic  Atraumatic  Normal jaw occlusion  Eyes:    Lids, conjunctiva normal  No scleral icterus  Ears:    Normal external ears  Nose:   Nares normal  No drainage  Mouth:   Lips, tongue normal  Mucosa normal  Phonation normal    Neck:   Supple  Symmetrical    Back:     Symmetric  No CVA tenderness  Lungs:     Normal respiratory effort  Clear to auscultation bilaterally  Chest wall:    No tenderness or deformity  Heart:    Regular rate and rhythm  Normal S1 and S2  No murmur  No JVD  No edema  Abdomen:     Soft  Non-tender  Bowel sounds active  Genitourinary:   No Tabares catheter present  Extremities:   Extremities normal  Atraumatic  No cyanosis  Skin:   Warm and dry  No pallor, jaundice, rash, ecchymoses  Neurologic:   Alert and oriented to person, place, time  No focal deficit  Lab, Imaging and other studies: I have personally reviewed pertinent labs  CBC: No results found for: WBC, HGB, HCT, MCV, PLT, ADJUSTEDWBC, MCH, MCHC, RDW, MPV, NRBC  CMP:   Lab Results   Component Value Date    K 4 0 03/01/2023     03/01/2023    CO2 23 03/01/2023    BUN 17 03/01/2023    CREATININE 0 76 03/01/2023    CALCIUM 8 7 03/01/2023    EGFR 101 03/01/2023           Results from last 7 days   Lab Units 03/01/23  0449 02/28/23  1724 02/28/23  0852 02/27/23  1404 02/27/23  0512 02/26/23  1341 02/26/23  0617 02/25/23  1814 02/25/23  0830   POTASSIUM mmol/L 4 0 4 2 4 1   < > 4 2   < > 4 6   < > 4 6   CHLORIDE mmol/L 100 96 95*   < > 93*   < > 92*   < > 78*   CO2 mmol/L 23 25 26   < > 25   < > 23   < > 27   BUN mg/dL 17 14 12   < > 22   < > 22   < > 14   CREATININE mg/dL 0 76 0 72 0 71   < > 0 82   < > 0 92   < > 0 83   CALCIUM mg/dL 8 7 8 7 9 0   < > 8 8   < > 9 3   < > 9 5   ALK PHOS U/L  --   --   --   --  108*  --  88  --  96   ALT U/L  --   --   --   --  8  --  8  --  9   AST U/L  --   --   --   --  19  --  19  --  22    < > = values in this interval not displayed  Phosphorus: No results found for: PHOS  Magnesium: No results found for: MG  Urinalysis: No results found for: Eleonore Do, SPECGRAV, PHUR, LEUKOCYTESUR, NITRITE, PROTEINUA, GLUCOSEU, KETONESU, BILIRUBINUR, BLOODU  Ionized Calcium: No results found for: CAION  Coagulation: No results found for: PT, INR, APTT  Troponin: No results found for: TROPONINI  ABG: No results found for: PHART, STM6RFJ, PO2ART, ZJU8UWP, K9EEVLXM, BEART, SOURCE  Radiology review:     IMAGING  No results found      Current Facility-Administered Medications   Medication Dose Route Frequency   • cefTRIAXone (ROCEPHIN) IVPB (premix in dextrose) 1,000 mg 50 mL  1,000 mg Intravenous Q24H   • cholecalciferol (VITAMIN D3) tablet 1,000 Units  1,000 Units Oral Daily   • cyanocobalamin (VITAMIN B-12) tablet 100 mcg  100 mcg Oral Daily   • Diclofenac Sodium (VOLTAREN) 1 % topical gel 2 g  2 g Topical 4x Daily PRN   • diphenhydrAMINE (BENADRYL) tablet 25 mg  25 mg Oral Q6H PRN   • docusate sodium (COLACE) capsule 100 mg  100 mg Oral BID PRN   • enoxaparin (LOVENOX) subcutaneous injection 40 mg  40 mg Subcutaneous H74B   • folic acid (FOLVITE) tablet 1 mg  1 mg Oral Daily   • levETIRAcetam (KEPPRA) tablet 750 mg  750 mg Oral Q12H [facility]   • LORazepam (ATIVAN) tablet 0 5 mg  0 5 mg Oral HS   • midodrine (PROAMATINE) tablet 5 mg  5 mg Oral TID AC   • multivitamin-minerals (CENTRUM) tablet 1 tablet  1 tablet Oral Daily   • nicotine (NICODERM CQ) 14 mg/24hr TD 24 hr patch 14 mg  14 mg Transdermal Daily   • ondansetron (ZOFRAN) injection 4 mg  4 mg Intravenous Q6H PRN   • sodium chloride 0 9 % infusion  75 mL/hr Intravenous Continuous   • thiamine tablet 100 mg  100 mg Oral Daily     Medications Discontinued During This Encounter   Medication Reason   • levETIRAcetam (KEPPRA) tablet 9,769 mg    • folic acid 1 mg, thiamine (VITAMIN B1) 100 mg in sodium chloride 0 9 % 100 mL IV piggyback        Scotty Frankel PA-C    Portions of the record may have been created with voice recognition software  Occasional wrong word or "sound a like" substitutions may have occurred due to the inherent limitations of voice recognition software  Read the chart carefully and recognize, using context, where substitutions have occurred

## 2023-03-02 ENCOUNTER — APPOINTMENT (OUTPATIENT)
Dept: PHYSICAL THERAPY | Facility: CLINIC | Age: 58
End: 2023-03-02

## 2023-03-02 ENCOUNTER — TRANSITIONAL CARE MANAGEMENT (OUTPATIENT)
Dept: FAMILY MEDICINE CLINIC | Facility: CLINIC | Age: 58
End: 2023-03-02

## 2023-03-02 DIAGNOSIS — Z71.89 COMPLEX CARE COORDINATION: Primary | ICD-10-CM

## 2023-03-02 NOTE — ASSESSMENT & PLAN NOTE
· Asymptomatic hypo-osmolar hyponatremia  · Nephrology's input appreciated   · Endocrinology input appreciated, normal ACTH stimulating test, no evidence of cortisol insufficiency  · Prescient nephrology input, combination of SIADH and volume depletion  · Discharge on fluid restriction, discontinue diuretics    Results from last 7 days   Lab Units 03/01/23  0449 02/28/23  1724 02/28/23  0852   SODIUM mmol/L 129* 127* 126*   POTASSIUM mmol/L 4 0 4 2 4 1   CHLORIDE mmol/L 100 96 95*   CO2 mmol/L 23 25 26   BUN mg/dL 17 14 12   CREATININE mg/dL 0 76 0 72 0 71   CALCIUM mg/dL 8 7 8 7 9 0

## 2023-03-02 NOTE — UTILIZATION REVIEW
NOTIFICATION OF ADMISSION DISCHARGE   This is a Notification of Discharge from [address]  Please be advised that this patient has been discharge from our facility  Below you will find the admission and discharge date and time including the patient’s disposition  UTILIZATION REVIEW CONTACT:  Siobhan Metzger  Utilization   Network Utilization Review Department  Phone: [phone] x carefully listen to the prompts  All voicemails are confidential   Email: [email or web address]     ADMISSION INFORMATION  PRESENTATION DATE: 2/25/2023  8:15 AM  OBERVATION ADMISSION DATE:   INPATIENT ADMISSION DATE: 2/25/23  9:21 AM   DISCHARGE DATE: 3/1/2023 11:20 AM   DISPOSITION:Home/Self Care    IMPORTANT INFORMATION:  Send all requests for admission clinical reviews, approved or denied determinations and any other requests to dedicated fax number below belonging to the campus where the patient is receiving treatment   List of dedicated fax numbers:  [address] DENIALS (Administrative/Medical Necessity) [phone]   [address] (Maternity/NICU/Pediatrics) [phone]   [facility] [phone]   [facility] [phone]   [address] [phone]   [address] [facility] [phone]   90 [facility] [phone]   [address] [place], [place] [address] [phone]   [facility]  [phone]   40[address] [facility] [phone]   412 [facility] [phone]

## 2023-03-02 NOTE — ASSESSMENT & PLAN NOTE
· Smoked 3-4 ppd of cigarettes for many years  · Currently smokes 1 ppd of cigarettes  · Smoking cessation counseling

## 2023-03-02 NOTE — ASSESSMENT & PLAN NOTE
· The patient reported that in the last 10 months he only drinks non-alcoholic beer, which he currently drinks 3 per day    · Remained without evidence of withdrawal

## 2023-03-02 NOTE — DISCHARGE SUMMARY
[address] [facility] [address] [place]  Discharge- Ajith Cure 1965, 65 y o  male MRN: [id number]  Unit/Bed#: 538-89 Encounter: [id number]  Primary Care Provider: Lazaro Stein DO   Date and time admitted to hospital: 2/25/2023  8:15 AM    * Hyponatremia  Assessment & Plan  · Asymptomatic hypo-osmolar hyponatremia  · Nephrology's input appreciated   · Endocrinology input appreciated, normal ACTH stimulating test, no evidence of cortisol insufficiency  · Prescient nephrology input, combination of SIADH and volume depletion  · Discharge on fluid restriction, discontinue diuretics    Results from last 7 days   Lab Units 03/01/23  0449 02/28/23  1724 02/28/23  0852   SODIUM mmol/L 129* 127* 126*   POTASSIUM mmol/L 4 0 4 2 4 1   CHLORIDE mmol/L 100 96 95*   CO2 mmol/L 23 25 26   BUN mg/dL 17 14 12   CREATININE mg/dL 0 76 0 72 0 71   CALCIUM mg/dL 8 7 8 7 9 0         Hypotension  Assessment & Plan  · Discharge on midodrine     Latest Reference Range & Units 02/26/23 06:17   Cortisol - AM 4 2 - 22 4 ug/dL 9 0       Acute cystitis with hematuria  Assessment & Plan  · <100K colonies Klebsiella pneumonia, >10K Proteus mirabilis  · Completed course with ceftriaxone       History of alcohol abuse  Assessment & Plan  · The patient reported that in the last 10 months he only drinks non-alcoholic beer, which he currently drinks 3 per day    · Remained without evidence of withdrawal       Seizure disorder [facility])  Assessment & Plan  · Recent elevated keppra level on 02/23/2023  · Repeat level pending  · For now continue decreased keppra dosing at 750 mg PO every 12 hours  · Outpatient follow-up with Neurology       Latest Reference Range & Units 02/23/23 09:17   LEVETIRACETA (KEPPRA) 10 0 - 40 0 ug/mL 44 1 (H)   (H): Data is abnormally high    Pancreatic pseudocyst  Assessment & Plan    · Outpatient follow-up and surveillance imaging with Gastroenterology    CT scan of the chest/abdomen/pelvis (02/25/2023):  PANCREAS:  Large pancreatic head pseudocyst measuring 6 4 x 6 5 cm, stable allowing for differences in measurement technique  Some peripheral calcifications noted as before  Continued prominence of the main pancreatic duct may reflect partial obstruction from the pseudocyst itself  Again correlate with lipase  Chronic pancreatic pseudocyst, stable  Tobacco use  Assessment & Plan  · Smoked 3-4 ppd of cigarettes for many years  · Currently smokes 1 ppd of cigarettes  · Smoking cessation counseling      Medical Problems     Resolved Problems  Date Reviewed: 3/2/2023   None       Discharging Physician / Practitioner: Mel Peterson MD  PCP: Renetta Negrete DO  Admission Date:   Admission Orders (From admission, onward)     Ordered        02/25/23 0921  Inpatient Admission  Once                      Discharge Date: 03/01/23    Consultations During Hospital Stay:  · Nephrology    Procedures Performed:   CT chest abdomen pelvis w contrast   Final Result by Yudi Batista MD (02/25 1211)   1  No suspected malignancy throughout the chest, abdomen or pelvis  2   Chronic pancreatic pseudocyst, stable  3   Old fractures throughout the chest, abdomen and pelvis  Workstation performed: [id number]               Significant Findings / Test Results:   Results from last 7 days   Lab Units 02/28/23  0852   WBC Thousand/uL 5 78   HEMOGLOBIN g/dL 11 6*   HEMATOCRIT % 33 3*   PLATELETS Thousands/uL 168     Results from last 7 days   Lab Units 03/01/23  0449 02/27/23  1404 02/27/23  0512   SODIUM mmol/L 129*   < > 123*   CHLORIDE mmol/L 100   < > 93*   CO2 mmol/L 23   < > 25   BUN mg/dL 17   < > 22   CREATININE mg/dL 0 76   < > 0 82   CALCIUM mg/dL 8 7   < > 8 8   ALK PHOS U/L  --   --  108*   ALT U/L  --   --  8   AST U/L  --   --  19    < > = values in this interval not displayed  Test Results Pending at Discharge (will require follow up):    · None       Complications:  None    Reason for Admission: hyponatremia     Hospital Course:   Shital Munroe is a 65 y o  male patient who originally presented to the hospital on 2/25/2023 due to hyponatremia  Etiology felt to be multifactorial, due to SIADH as well as volume depletion  There was no evidence of cortisol deficiency with the patient having undergone ACTH stimulating test   The patient also was noted for hypotension and initiated on midodrine there after remaining hemodynamically stable  The patient remained asymptomatic throughout hospitalization  He was discharged in a stable condition with his diuretic therapy discontinued  Return precautions were discussed  He is to follow-up with PCP and nephrology  Please see above list of diagnoses and related plan for additional information  Condition at Discharge: stable    Discharge Day Visit / Exam:   Subjective: Patient seen and examined  He would like to be discharged home  He reports feeling well and denies any complaints  Vitals: Blood Pressure: 115/75 (03/01/23 0951)  Pulse: 83 (03/01/23 0951)  Temperature: 97 9 °F (36 6 °C) (03/01/23 0951)  Temp Source: Oral (02/28/23 0030)  Respirations: 18 (03/01/23 0951)  Height: 5' 4" (162 6 cm) (02/25/23 0957)  Weight - Scale: 54 6 kg (120 lb 6 4 oz) (03/01/23 0559)  SpO2: 99 % (03/01/23 0951)  Exam:   Physical Exam  Constitutional:       General: He is not in acute distress  HENT:      Head: Normocephalic and atraumatic  Nose: No congestion  Eyes:      Conjunctiva/sclera: Conjunctivae normal    Cardiovascular:      Rate and Rhythm: Normal rate and regular rhythm  Heart sounds: No murmur heard  Pulmonary:      Effort: No respiratory distress  Breath sounds: No wheezing or rales  Abdominal:      General: There is no distension  Tenderness: There is no abdominal tenderness  There is no guarding  Musculoskeletal:      Right lower leg: No edema  Left lower leg: No edema  Skin:     General: Skin is warm and dry  Neurological:      Mental Status: He is oriented to person, place, and time  Psychiatric:         Mood and Affect: Mood normal             Discharge instructions/Information to patient and family:   See after visit summary for information provided to patient and family  Provisions for Follow-Up Care:  See after visit summary for information related to follow-up care and any pertinent home health orders  Disposition:   Home    Planned Readmission: No     Discharge Statement:  I spent 45 minutes discharging the patient  This time was spent on the day of discharge  I had direct contact with the patient on the day of discharge  Greater than 50% of the total time was spent examining patient, answering all patient questions, arranging and discussing plan of care with patient as well as directly providing post-discharge instructions  Additional time then spent on discharge activities  Discharge Medications:  See after visit summary for reconciled discharge medications provided to patient and/or family        **Please Note: This note may have been constructed using a voice recognition system**

## 2023-03-02 NOTE — ASSESSMENT & PLAN NOTE
· Discharge on midodrine     Latest Reference Range & Units 02/26/23 06:17   Cortisol - AM 4 2 - 22 4 ug/dL 9 0

## 2023-03-03 ENCOUNTER — TELEPHONE (OUTPATIENT)
Dept: NEPHROLOGY | Facility: CLINIC | Age: 58
End: 2023-03-03

## 2023-03-03 ENCOUNTER — PATIENT OUTREACH (OUTPATIENT)
Dept: FAMILY MEDICINE CLINIC | Facility: CLINIC | Age: 58
End: 2023-03-03

## 2023-03-03 DIAGNOSIS — F10.10 ALCOHOL ABUSE: ICD-10-CM

## 2023-03-03 DIAGNOSIS — G47.00 INSOMNIA, UNSPECIFIED TYPE: ICD-10-CM

## 2023-03-03 DIAGNOSIS — E87.1 CHRONIC HYPONATREMIA: Primary | ICD-10-CM

## 2023-03-03 DIAGNOSIS — E53.8 FOLIC ACID DEFICIENCY: ICD-10-CM

## 2023-03-03 NOTE — TELEPHONE ENCOUNTER
Patient called asking if he is still supposed to be taking the sodium chloride? Patient stated while he was in the hospital , he was told he should not be taking it  Patient stated when he doesn't take it he feels weird, as soon as he takes if and eats he feels better

## 2023-03-03 NOTE — PROGRESS NOTES
Outpatient Care Management Note:  Chart notes reviewed prior to outreach call  Outreach call placed to Mr Clare Stearns  Introduced myself and my role, he is agreeable to outreach  Alpa Jennifer is questioning if he should be taking his sodium chloride tablets  Per Alpa Rodriguez, he was told in the hospital not to take them which does not seem to make sense  Advised him that as per Janet Loveless note he should be taking 1 gram three times a day  Alpa Rodriguez states that he did take them yesterday and today but wanted to confirm  Asked Alpa Rodriguez when he was having repeat lab work completed  Per Alpa Rodriguez, none was ordered on discharge  Reviewed chart and no lab orders are in place  Advised him I would message Kacey Berman to place the orders  Alpa Rodriguez will have the labs drawn on Monday  He has stopped his Bumex and started the midodrine  States all other medications have been picked up  Alpa Rodriguez has been sober for 10 months  He consumes 3 12 ounce non-alcoholic beers/day as well as 4 ounces of water with his medications  He is aware that is the limit of his fluid restriction  Alpa Rodriguez is not interested in quitting smoking at this time  Alpa Rodriguez is currently staying with his brother in a story home because of his leg issues  He is attending outpatient PT for same  Lanajuan Jennifer is independent with his ADL's  Advised him I would follow up in 1 week to see how he is feeling and how his lab work is doing  Encouraged to call sooner with any questions or concerns

## 2023-03-06 ENCOUNTER — APPOINTMENT (OUTPATIENT)
Dept: LAB | Facility: MEDICAL CENTER | Age: 58
End: 2023-03-06

## 2023-03-06 ENCOUNTER — EVALUATION (OUTPATIENT)
Dept: PHYSICAL THERAPY | Facility: CLINIC | Age: 58
End: 2023-03-06

## 2023-03-06 DIAGNOSIS — E87.1 CHRONIC HYPONATREMIA: ICD-10-CM

## 2023-03-06 DIAGNOSIS — M62.511 ATROPHY OF MUSCLE OF RIGHT SHOULDER: Primary | ICD-10-CM

## 2023-03-06 LAB
ANION GAP SERPL CALCULATED.3IONS-SCNC: 7 MMOL/L (ref 4–13)
BUN SERPL-MCNC: 8 MG/DL (ref 5–25)
CALCIUM SERPL-MCNC: 9.1 MG/DL (ref 8.3–10.1)
CHLORIDE SERPL-SCNC: 101 MMOL/L (ref 96–108)
CO2 SERPL-SCNC: 23 MMOL/L (ref 21–32)
CREAT SERPL-MCNC: 0.56 MG/DL (ref 0.6–1.3)
GFR SERPL CREATININE-BSD FRML MDRD: 115 ML/MIN/1.73SQ M
GLUCOSE P FAST SERPL-MCNC: 77 MG/DL (ref 65–99)
POTASSIUM SERPL-SCNC: 3.8 MMOL/L (ref 3.5–5.3)
SODIUM SERPL-SCNC: 131 MMOL/L (ref 135–147)

## 2023-03-06 RX ORDER — LORAZEPAM 0.5 MG/1
0.5 TABLET ORAL
Qty: 30 TABLET | Refills: 0 | Status: SHIPPED | OUTPATIENT
Start: 2023-03-06

## 2023-03-06 RX ORDER — FOLIC ACID 1 MG/1
1 TABLET ORAL DAILY
Qty: 30 TABLET | Refills: 0 | Status: SHIPPED | OUTPATIENT
Start: 2023-03-06

## 2023-03-06 RX ORDER — UBIDECARENONE 75 MG
100 CAPSULE ORAL DAILY
Qty: 30 TABLET | Refills: 5 | Status: SHIPPED | OUTPATIENT
Start: 2023-03-06

## 2023-03-06 RX ORDER — LANOLIN ALCOHOL/MO/W.PET/CERES
100 CREAM (GRAM) TOPICAL DAILY
Qty: 30 TABLET | Refills: 0 | Status: SHIPPED | OUTPATIENT
Start: 2023-03-06

## 2023-03-06 NOTE — PROGRESS NOTES
PT Re-Evaluation     Today's date: 3/6/2023  Patient name: Pillo Huang  : 1965  MRN: [id number]  Referring provider: Tyrone Hill PA-C  Dx:   Encounter Diagnosis     ICD-10-CM    1  Atrophy of muscle of right shoulder  M62 511           Start Time: 0800  Stop Time: 0900  Total time in clinic (min): 60 minutes    Assessment  Assessment details: Patient is a 66 yo male with medical diagnosis of atrophy of right shoulder musculature  During re-evaluation, overall improvement in right shoulder external rotation ROM and right internal rotation strength  Introduced towel IR stretch and cane abduction to promote progression in areas of continued limitation  Progressing c/ scapular strength as noted in completion of bent over exercises c/ increased weight and improved technique  Patient continues to progress towards LTGs, but they are not met at this time  He will benefit from continued skilled PT interventions to address remaining impairments and functional limitations c/ specific focus on progression to home exercise program over the next 1-4 weeks  Impairments: abnormal or restricted ROM, activity intolerance, impaired physical strength, pain with function, scapular dyskinesis and poor posture     Goals  STG (3 weeks): Increase right shoulder ROM by 10 degrees elevation  Met  Improve R SH strength by 1/2 grade  Met  LTG (8 weeks):  R SH ROM WNL  Progressing   >=4/5 R Shoulder Strength Progressing  75% improvement in right shoulder pain  Progressing  Patient will be independent with HEP in 8 weeks to allow independent management of condition  Progressing  Plan  Plan details: TE, NMR, TA, MT, self-care, and modalities as needed in order to progress through skilled PT focused on ROM, strength, posture, motor control     Patient would benefit from: skilled physical therapy  Planned modality interventions: cryotherapy and thermotherapy: hydrocollator packs  Planned therapy interventions: manual therapy, neuromuscular re-education, patient education, self care, therapeutic activities, therapeutic exercise and home exercise program  Frequency: 2x week  Duration in weeks: 4  Treatment plan discussed with: patient        Subjective Evaluation    History of Present Illness  Mechanism of injury: IE: Patient is a 64 yo male presenting with right shoulder pain  He has history of fall and fracture on this shoulder c/ proximal oral plate and screws, which are "well-healed " Per chart review of ortho note from 22  Symptoms began a few weeks ago, he felt a click and thought his shoulder was dislocated  Has had pain since, (-) for dislocation per ortho  Moving his shoulder in all directions is painful  Denies any falls on right shoulder  Patients primary goals for PT are to to decrease pain  UPDATE : Patient reports approx  50-60% improvement in right shoulder pain  Continues to have difficulty reaching into top shelf and lifting any weight overhead even c/ b/l arms; however has less pain and improved function when not lifting weights  UPDATE 3/6: Patient presents following hospitalization for sodium levels last week  He reports sodium levels were at 129 when he left the hospital and they continuing to monitor him closely  Right shoulder pain continues to be between 50-60% improved since IE  Primary complaints are reaching overhead and reaching behind his back     Pain  Current pain ratin    Patient Goals  Patient goals for therapy: decreased pain, increased motion, increased strength and return to sport/leisure activities          Objective    Scapular Stability: Weakness evident during rows c/ compensatory pattern of upper trap despite vc's    Shoulder ROM (degrees):  FLX (R/L): 115 / 137  ABD (R/L): 116 / 115  ER (R/L): 65 / 50  IR (R/L): T12 / T8    Shoulder Strength (MMT Grades):  FLX (R/L): 4- / 4+  ABD (R/L): 4- / 4+  ER (R/L): 4- / 4+  IR (R/L): 4 / 4+    Shoulder Special Tests (R):  Denis: (+) in scap plane           Precautions: FALL RISK      Date  2/16 2/20 2/23 3/6   Manuals        PROM all planes  CM CM VR KS   Assessment                        Neuro Re-Ed        Ball Stability        Bilateral ER        Standing TB Row/Ext  L4 2x10 L5 2x10  L5 3x10   SA Punches  3# stick 3x10 Stick 3# 2x10 Stick 3# 2x10 Stick 3# 2x10   Bent over on wedge Row/Ext  2# 3x10 ea 2# 3x10 ea 2# 3z10 ea 2# 3x10   Bent over on wedge Hz abd  2# 2x10 2# 2x10 2# 3x10  2# 3x10           Ther Ex        Stick: FLX & ER  5"x20 ea 5"x20 ea  5"x20 FLX only   Stick: ABD     5"x20   Posterior Capsule Stretch        S/Lying ER/ABD  1# 2x10 ea 1# 2x10 ea     Standing TB ER  L4 2x10 L5 3x10 L5 3x10 L5 3x10   Standing TB IR  L4 2x10 L5 3x10 L5 3x10 L5 3x10   Pulleys  4' scap  4' scap  4' scap 4' Scap   Bicep Curls w/ LAQ DC LAQ  5# 3x10  SL P2 LAQ 5# 3x10  SL P2 LAQ 5# 3x10  SL P2 LAQ 6# 3x10  SL P2 LAQ   Tricep Ext  L5 3x10 L5 3x10 L5 3x10 L5 3x10   Nustep   L6 10' UE Endurance L6 10'  UE ROM L6 10'  UE ROM L6 10'  UE ROM   Strap IR Stretch                Ther Activity        Standing Scaption  10x  3x10 1# 2x10 1# 2x10   Standing abduction  10x  2x10 1# 2x10 1# 2x10   Push ups                Modalities        MHP  During Supine Exercises During PROM During supine to lumbar During Supine to L/S

## 2023-03-07 ENCOUNTER — TELEMEDICINE (OUTPATIENT)
Dept: FAMILY MEDICINE CLINIC | Facility: CLINIC | Age: 58
End: 2023-03-07

## 2023-03-07 DIAGNOSIS — E87.1 CHRONIC HYPONATREMIA: Primary | ICD-10-CM

## 2023-03-07 DIAGNOSIS — Z76.89 ENCOUNTER FOR SUPPORT AND COORDINATION OF TRANSITION OF CARE: ICD-10-CM

## 2023-03-07 NOTE — PROGRESS NOTES
Virtual Regular Visit    Verification of patient location:    Patient is located in the following state in which I hold an active license [place]      Assessment/Plan:    Problem List Items Addressed This Visit        Other    Chronic hyponatremia - Primary   Other Visit Diagnoses     Encounter for support and coordination of transition of care            Patient hemodynamically stable, clinically well-appearing and in no acute distress  Patient continued to fluid restrict  Patient also to continue sodium chloride  BMP indicates that hyponatremia is trending up  Currently 131  Patient is to get repeat BMP in a week  Patient continues to follow with nephrology  No other concerns  Follow-up as needed  Reason for visit is   Chief Complaint   Patient presents with   • Transition of Care Management        Encounter provider Vania Jennings DO    Provider located at [address] [id number]      Recent Visits  No visits were found meeting these conditions  Showing recent visits within past 7 days and meeting all other requirements  Today's Visits  Date Type Provider Dept   03/07/23 Telemedicine Vania Jennings DO [facility]   Showing today's visits and meeting all other requirements  Future Appointments  No visits were found meeting these conditions  Showing future appointments within next 150 days and meeting all other requirements       The patient was identified by name and date of birth  Angelito Celeste was informed that this is a telemedicine visit and that the visit is being conducted through Telephone  My office door was closed  No one else was in the room  He acknowledged consent and understanding of privacy and security of the video platform  The patient has agreed to participate and understands they can discontinue the visit at any time  Patient is aware this is a billable service  Subjective  Angelito Celeste is a 65 y o  male past medical history of hyponatremia presents to the office for transition of care management  Patient was recently seen can secondary to hyponatremia  Patient was also noted to be hypotensive and initiated on midodrine on  Patient remained asymptomatic throughout hospital stay  Currently doing well with no acute distress  Patient denies chest pain, shortness of breath, fevers, chills, night sweats  Patient feels much better as well  Patient been compliant with all medications otherwise  Patient currently fluid restricted secondary to hyponatremia  He has no concerns  Past Medical History:   Diagnosis Date   • Alcohol abuse    • Traore esophagus    • Bowel obstruction (HCC)    • Bowel perforation (HCC)    • Cardiac disease    • Continuous chronic alcoholism ([facility] [address] ) 10/5/2017   • COPD (chronic obstructive pulmonary disease) (HCC)    • History of shoulder surgery     Right shoulder   • History of transfusion    • Hx of cervical spine surgery    • Hypertension    • Incisional hernia 10/8/2017   • MI, old    • Mitral regurgitation    • Psychiatric disorder    • Seizures ([facility] [address] )         Past Surgical History:   Procedure Laterality Date   • APPENDECTOMY     • BACK SURGERY     • CHOLECYSTECTOMY     • ESOPHAGOGASTRODUODENOSCOPY N/A 11/28/2016    Procedure: ESOPHAGOGASTRODUODENOSCOPY (EGD); Surgeon: Viktoria Messer MD;  Location: [facility];   Service:    • GALLBLADDER SURGERY     • LAPAROTOMY N/A 10/25/2016    Procedure: LAPAROTOMY EXPLORATORY;  Surgeon: Drew Esquivel MD;  Location: [facility];  Service:    • MOUTH SURGERY     • PERCUTANEOUS PINNING FEMORAL NECK FRACTURE     • SHOULDER SURGERY Right    • SHOULDER SURGERY     • SMALL INTESTINE SURGERY     • STOMACH SURGERY      bal surgery       Current Outpatient Medications   Medication Sig Dispense Refill   • Cholecalciferol 25 MCG (1000 UT) tablet Take 1 tablet (1,000 Units total) by mouth daily 30 tablet 0   • cyanocobalamin (VITAMIN B-12) 100 mcg tablet Take 1 tablet (100 mcg total) by mouth daily 30 tablet 5   • Diclofenac Sodium (VOLTAREN) 1 % Apply 2 g topically 4 (four) times a day 100 g 1   • docusate sodium (COLACE) 100 mg capsule Take 1 capsule (100 mg total) by mouth 2 (two) times a day as needed for constipation 30 capsule 0   • ergocalciferol (VITAMIN D2) 50,000 units Take 1 capsule (50,000 Units total) by mouth once a week 13 capsule 1   • folic acid (FOLVITE) 1 mg tablet Take 1 tablet (1 mg total) by mouth daily 30 tablet 0   • levETIRAcetam (KEPPRA) 1000 MG tablet Take 1 tablet (1,000 mg total) by mouth every 12 (twelve) hours 60 tablet 2   • lisinopril (ZESTRIL) 2 5 mg tablet Take 1 tablet (2 5 mg total) by mouth daily 90 tablet 2   • LORazepam (ATIVAN) 0 5 mg tablet Take 1 tablet (0 5 mg total) by mouth daily at bedtime 30 tablet 0   • midodrine (PROAMATINE) 5 mg tablet Take 1 tablet (5 mg total) by mouth 3 (three) times a day before meals 90 tablet 0   • Multiple Vitamin (TAB-A-BONI PO) Take 1 tablet by mouth daily     • multivitamin (THERAGRAN) TABS Take 1 tablet by mouth daily     • nicotine (NICODERM CQ) 14 mg/24hr TD 24 hr patch Place 1 patch on the skin over 24 hours daily Do not start before March 2, 2023  28 patch 0   • sodium chloride 1 g tablet Take 1 tablet (1 g total) by mouth 3 (three) times a day with meals 90 tablet 0   • thiamine 100 MG tablet Take 1 tablet (100 mg total) by mouth daily 30 tablet 0     No current facility-administered medications for this visit  No Known Allergies    Review of Systems   Constitutional: Negative for chills and fever  HENT: Negative for ear pain and sore throat  Eyes: Negative for pain and visual disturbance  Respiratory: Negative for cough and shortness of breath  Cardiovascular: Negative for chest pain and palpitations  Gastrointestinal: Negative for abdominal pain and vomiting  Genitourinary: Negative for dysuria and hematuria     Musculoskeletal: Negative for arthralgias and back pain  Skin: Negative for color change and rash  Neurological: Negative for seizures and syncope  All other systems reviewed and are negative  Video Exam    There were no vitals filed for this visit      Physical Exam     I spent 15 minutes directly with the patient during this visit

## 2023-03-08 ENCOUNTER — DOCUMENTATION (OUTPATIENT)
Dept: FAMILY MEDICINE CLINIC | Facility: CLINIC | Age: 58
End: 2023-03-08

## 2023-03-08 NOTE — PROGRESS NOTES
PATIENT REQUESTING A NEW RX FOR AN ALBUTEROL MDI  BACK IN April 2022 SOMEONE INADVERTANTLY REMOVED IT FROM HIS MED LIST    HE NO LONGER USES THE ALBUTEROL SOLUTION BUT NEEDS THE RESCUE INHAMER SENT TO [facility] PLEASE

## 2023-03-09 ENCOUNTER — OFFICE VISIT (OUTPATIENT)
Dept: PHYSICAL THERAPY | Facility: CLINIC | Age: 58
End: 2023-03-09

## 2023-03-09 DIAGNOSIS — M62.511 ATROPHY OF MUSCLE OF RIGHT SHOULDER: Primary | ICD-10-CM

## 2023-03-09 DIAGNOSIS — R26.2 AMBULATORY DYSFUNCTION: ICD-10-CM

## 2023-03-09 NOTE — PROGRESS NOTES
PT Discharge    Today's date: 3/9/2023  Patient name: Cadence Dominguez  : 1965  MRN: [id number]  Referring provider: Jennifer Townsend PA-C  Dx:   Encounter Diagnosis     ICD-10-CM    1  Atrophy of muscle of right shoulder  M62 511       2  Ambulatory dysfunction  R26 2           Start Time: 0800  Stop Time: 0900  Total time in clinic (min): 60 minutes    Assessment  Assessment details: Patient is a 66 yo male with medical diagnosis of atrophy of right shoulder musculature  Re-evaluation completed last visit,  patient reports readiness to transition to independent HEP  He is completing regularly at home  Good PROM this visit  He will benefit from continued progression to LTGs via independent HEP  He is discharged from PT at this time, will contact us for further car as indicated  Impairments: abnormal or restricted ROM, activity intolerance, impaired physical strength, pain with function, scapular dyskinesis and poor posture     Goals  STG (3 weeks): Increase right shoulder ROM by 10 degrees elevation  Met  Improve R SH strength by 1/2 grade  Met  LTG (8 weeks):  R SH ROM WNL  Partially met,WFL   >=4/5 R Shoulder Strength Progressing  75% improvement in right shoulder pain  Progressing  Patient will be independent with HEP in 8 weeks to allow independent management of condition  Met  Plan  Plan details: D/c patient to independent management c/ HEP  Planned therapy interventions: home exercise program  Treatment plan discussed with: patient        Subjective Evaluation    History of Present Illness  Mechanism of injury: IE: Patient is a 66 yo male presenting with right shoulder pain  He has history of fall and fracture on this shoulder c/ proximal oral plate and screws, which are "well-healed " Per chart review of ortho note from 22  Symptoms began a few weeks ago, he felt a click and thought his shoulder was dislocated  Has had pain since, (-) for dislocation per ortho   Moving his shoulder in all directions is painful  Denies any falls on right shoulder  Patients primary goals for PT are to to decrease pain  UPDATE : Patient reports approx  50-60% improvement in right shoulder pain  Continues to have difficulty reaching into top shelf and lifting any weight overhead even c/ b/l arms; however has less pain and improved function when not lifting weights  UPDATE 3/6: Patient presents following hospitalization for sodium levels last week  He reports sodium levels were at 129 when he left the hospital and they continuing to monitor him closely  Right shoulder pain continues to be between 50-60% improved since IE  Primary complaints are reaching overhead and reaching behind his back  UPDATE 3/9: Patient reports he is comfortable performing all HEP exercises at home independently  Has follow-up c/ doctor on Monday regarding his shoulder  Feels ready to be discharged at this time     Pain  Current pain ratin    Patient Goals  Patient goals for therapy: decreased pain, increased motion, increased strength and return to sport/leisure activities          Objective    Scapular Stability: Weakness evident during rows c/ compensatory pattern of upper trap despite vc's    Shoulder ROM (degrees):  FLX (R/L): 115 / 137  ABD (R/L): 116 / 115  ER (R/L): 65 / 50  IR (R/L): T12 / T8    Shoulder Strength (MMT Grades):  FLX (R/L): 4- / 4+  ABD (R/L): 4- / 4+  ER (R/L): 4- / 4+  IR (R/L): 4 / 4+    Shoulder Special Tests (R):  Denis: (+) in scap plane           Precautions: FALL RISK      Date 3/9 2/16 2/20 2/23 3/6   Manuals        PROM all planes KS CM CM VR KS   Assessment                        Neuro Re-Ed        Ball Stability        Bilateral ER        Standing TB Row/Ext L5 3x10 L4 2x10 L5 2x10  L5 3x10   SA Punches 3# Stick 2x10 3# stick 3x10 Stick 3# 2x10 Stick 3# 2x10 Stick 3# 2x10   Bent over on wedge Row/Ext 2# 3x10 2# 3x10 ea 2# 3x10 ea 2# 3z10 ea 2# 3x10   Bent over on wedge Hz abd 2# 3x10 2# 2x10 2# 2x10 2# 3x10  2# 3x10           Ther Ex        Stick: FLX & ER 5"x20 3# 5"x20 ea 5"x20 ea  5"x20 FLX only   Stick: ABD 5"x20    5"x20   Posterior Capsule Stretch        S/Lying ER/ABD 1# 2x10ea 1# 2x10 ea 1# 2x10 ea     Standing TB ER L5 3x10 L4 2x10 L5 3x10 L5 3x10 L5 3x10   Standing TB IR L5 3x10 L4 2x10 L5 3x10 L5 3x10 L5 3x10   Pulleys 4' Scap 4' scap  4' scap  4' scap 4' Scap   Bicep Curls w/ LAQ 6# 3x10  SL P2 LAQ 5# 3x10  SL P2 LAQ 5# 3x10  SL P2 LAQ 5# 3x10  SL P2 LAQ 6# 3x10  SL P2 LAQ   Tricep Ext L5 3x10 L5 3x10 L5 3x10 L5 3x10 L5 3x10   Nustep  L6 10'  UE ROM L6 10' UE Endurance L6 10'  UE ROM L6 10'  UE ROM L6 10'  UE ROM   Strap IR Stretch                Ther Activity        Standing Scaption 1# 2x10 10x  3x10 1# 2x10 1# 2x10   Standing abduction 1# 2x10 10x  2x10 1# 2x10 1# 2x10   Push ups                Modalities        MHP During Supine to L/S During Supine Exercises During PROM During supine to lumbar During Supine to L/S

## 2023-03-10 ENCOUNTER — PATIENT OUTREACH (OUTPATIENT)
Dept: FAMILY MEDICINE CLINIC | Facility: CLINIC | Age: 58
End: 2023-03-10

## 2023-03-10 NOTE — PROGRESS NOTES
Outpatient Care Management Note:   Follow up call placed to [facility]  He is feeling well  [facility] is following his fluid restrictions and taking his medications as prescribed  Remains off diuretics with no lower extremity swelling or increased shortness of breath  No increased abdominal distention  His recent lab work showed an improved sodium  He will have lab work completed next week as ordered  He has completed his physical therapy for his right shoulder  Denies any needs  Encouraged to call with any questions or concerns

## 2023-03-13 ENCOUNTER — APPOINTMENT (OUTPATIENT)
Dept: PHYSICAL THERAPY | Facility: CLINIC | Age: 58
End: 2023-03-13

## 2023-03-14 ENCOUNTER — TELEPHONE (OUTPATIENT)
Dept: NEUROLOGY | Facility: CLINIC | Age: 58
End: 2023-03-14

## 2023-03-14 DIAGNOSIS — E87.1 HYPONATREMIA: ICD-10-CM

## 2023-03-14 DIAGNOSIS — J44.1 CHRONIC OBSTRUCTIVE PULMONARY DISEASE WITH ACUTE EXACERBATION (HCC): Primary | ICD-10-CM

## 2023-03-14 RX ORDER — SODIUM CHLORIDE 1000 MG
1 TABLET, SOLUBLE MISCELLANEOUS
Qty: 90 TABLET | Refills: 0 | Status: SHIPPED | OUTPATIENT
Start: 2023-03-14 | End: 2023-03-17 | Stop reason: SDUPTHER

## 2023-03-14 RX ORDER — ALBUTEROL SULFATE 90 UG/1
2 AEROSOL, METERED RESPIRATORY (INHALATION) EVERY 6 HOURS PRN
Qty: 8 G | Refills: 1 | Status: SHIPPED | OUTPATIENT
Start: 2023-03-14 | End: 2023-03-15 | Stop reason: SDUPTHER

## 2023-03-14 NOTE — TELEPHONE ENCOUNTER
Patient states he called a few days ago looking for a refill on his albuterol inhaler  He states he only uses this when needed in the Spring/Summer but it is d/c in his chart  Can this be ordered for patient?

## 2023-03-15 DIAGNOSIS — J44.1 CHRONIC OBSTRUCTIVE PULMONARY DISEASE WITH ACUTE EXACERBATION (HCC): ICD-10-CM

## 2023-03-15 RX ORDER — ALBUTEROL SULFATE 90 UG/1
2 AEROSOL, METERED RESPIRATORY (INHALATION) EVERY 6 HOURS PRN
Qty: 8 G | Refills: 1 | Status: SHIPPED | OUTPATIENT
Start: 2023-03-15

## 2023-03-16 ENCOUNTER — APPOINTMENT (OUTPATIENT)
Dept: LAB | Facility: MEDICAL CENTER | Age: 58
End: 2023-03-16

## 2023-03-16 ENCOUNTER — APPOINTMENT (OUTPATIENT)
Dept: PHYSICAL THERAPY | Facility: CLINIC | Age: 58
End: 2023-03-16

## 2023-03-16 ENCOUNTER — OFFICE VISIT (OUTPATIENT)
Dept: UROLOGY | Facility: CLINIC | Age: 58
End: 2023-03-16

## 2023-03-16 VITALS
BODY MASS INDEX: 20.79 KG/M2 | WEIGHT: 121.8 LBS | HEIGHT: 64 IN | DIASTOLIC BLOOD PRESSURE: 90 MMHG | SYSTOLIC BLOOD PRESSURE: 134 MMHG

## 2023-03-16 DIAGNOSIS — Z12.5 PROSTATE CANCER SCREENING: ICD-10-CM

## 2023-03-16 DIAGNOSIS — E87.1 CHRONIC HYPONATREMIA: ICD-10-CM

## 2023-03-16 DIAGNOSIS — N30.00 ACUTE CYSTITIS WITHOUT HEMATURIA: Primary | ICD-10-CM

## 2023-03-16 DIAGNOSIS — G40.909 SEIZURE DISORDER (HCC): ICD-10-CM

## 2023-03-16 LAB
ANION GAP SERPL CALCULATED.3IONS-SCNC: 8 MMOL/L (ref 4–13)
BUN SERPL-MCNC: 6 MG/DL (ref 5–25)
CALCIUM SERPL-MCNC: 9.4 MG/DL (ref 8.3–10.1)
CHLORIDE SERPL-SCNC: 88 MMOL/L (ref 96–108)
CO2 SERPL-SCNC: 26 MMOL/L (ref 21–32)
CREAT SERPL-MCNC: 0.54 MG/DL (ref 0.6–1.3)
GFR SERPL CREATININE-BSD FRML MDRD: 117 ML/MIN/1.73SQ M
GLUCOSE P FAST SERPL-MCNC: 75 MG/DL (ref 65–99)
POTASSIUM SERPL-SCNC: 3.7 MMOL/L (ref 3.5–5.3)
SODIUM SERPL-SCNC: 122 MMOL/L (ref 135–147)

## 2023-03-16 RX ORDER — LEVETIRACETAM 1000 MG/1
1000 TABLET ORAL EVERY 12 HOURS
Qty: 60 TABLET | Refills: 2 | Status: SHIPPED | OUTPATIENT
Start: 2023-03-16

## 2023-03-16 NOTE — PROGRESS NOTES
3/16/2023    No chief complaint on file  Assessment and Plan    65 y o  male     1  Acute cystitis   · Hospitalized on 2/25/2023 for hyponatremia and treated for acute cystitis positive for Klebsiella pneumoniae  · Currently asymptomatic and denies any gross hematuria  · Unable to provide urine sample today  · CT imaging with contrast showed normal kidneys and bladder  · Given history of bladder wall thickening seen on CT imaging and significant smoking history, I have placed orders for UA/micro testing to be done  He will follow-up in 3-4 months with repeat testing again  2  Prostate Cancer Screening  · Negative family history of prostate cancer  · PSA 1 1 (10/20/2020)  · BRETT reveals smooth symmetric prostate, no palpable nodules or masses  · PSA ordered for 1 week follow-up 1 year pending those results  Subjective:    History of Present Illness  Fabián Smith is a 65 y o  male here for evaluation of acute cystitis  He was hospitalized at [address] [place] [address] on 2/25/2023 for hyponatremia  Etiology was felt to be multifactorial, due to SIADH as well as volume depletion  He was also treated for acute cystitis as urine culture was positive for >100,000 cfu/ml Klebsiella pneumoniae and <10,000 cfu/ml proteus species  He presents today reporting doing well since discharge from the hospital  He denies any history of UTIs in the past or issues with prostatitis or BPH  He currently denies any gross hematuria, weak urinary stream, frequency, or urgency  He does get up about 3 times per night but is not bothered by this  He is a current smoker having smoke for 43 years on average 4 ppd now reduced to 1 ppd  He had been seen in the past by [facility] in September of 2020 for evaluation of bladder wall thickening seen on CT imaging from August 2020  Due to his smoking history he was recommended to undergo a cystoscopy but never followed up   He did have a recent CT chest abdomen and pelvis with contrast on 2/25/2023 which showed normal bladder and kidneys  Prostate cancer screening: Negative family history of prostate cancer  Last PSA 1 1 on 10/20/2020  Review of Systems   Constitutional: Negative for chills and fever  HENT: Negative for congestion and sore throat  Respiratory: Negative for cough and shortness of breath  Cardiovascular: Negative for chest pain and leg swelling  Gastrointestinal: Negative for abdominal pain, constipation, diarrhea, nausea and vomiting  Genitourinary: Negative for difficulty urinating, dysuria, flank pain, frequency, hematuria and urgency  Nocturia    Musculoskeletal: Negative for back pain and gait problem  Skin: Negative for wound  Allergic/Immunologic: Negative for immunocompromised state  Neurological: Negative for dizziness, weakness and numbness  Hematological: Does not bruise/bleed easily  Vitals  Vitals:    03/16/23 0935   BP: 134/90   Weight: 55 2 kg (121 lb 12 8 oz)   Height: 5' 4" (1 626 m)       Physical Exam  Vitals reviewed  Constitutional:       General: He is not in acute distress  Appearance: Normal appearance  He is not ill-appearing or toxic-appearing  HENT:      Head: Normocephalic and atraumatic  Eyes:      General: No scleral icterus  Conjunctiva/sclera: Conjunctivae normal    Cardiovascular:      Rate and Rhythm: Normal rate  Pulmonary:      Effort: Pulmonary effort is normal  No respiratory distress  Abdominal:      Tenderness: There is no right CVA tenderness or left CVA tenderness  Hernia: No hernia is present  Genitourinary:     Comments: Rectal exam reveals normal tone, no masses and his prostate is smooth, symmetric, and benign  No nodules  Musculoskeletal:      Cervical back: Normal range of motion  Right lower leg: No edema  Left lower leg: No edema  Skin:     General: Skin is warm and dry  Coloration: Skin is not jaundiced or pale     Neurological: General: No focal deficit present  Mental Status: He is alert and oriented to person, place, and time  Mental status is at baseline  Gait: Gait normal    Psychiatric:         Mood and Affect: Mood normal          Behavior: Behavior normal          Thought Content: Thought content normal          Judgment: Judgment normal          Past History  Past Medical History:   Diagnosis Date   • Alcohol abuse    • Traore esophagus    • Bowel obstruction (HCC)    • Bowel perforation (HCC)    • Cardiac disease    • Continuous chronic alcoholism ([facility] [address] ) 10/5/2017   • COPD (chronic obstructive pulmonary disease) (HCC)    • History of shoulder surgery     Right shoulder   • History of transfusion    • Hx of cervical spine surgery    • Hypertension    • Incisional hernia 10/8/2017   • MI, old    • Mitral regurgitation    • Psychiatric disorder    • [facility]      Social History     Socioeconomic History   • Marital status: Single     Spouse name: None   • Number of children: None   • Years of education: None   • Highest education level: None   Occupational History   • None   Tobacco Use   • Smoking status: Every Day     Packs/day: 3 00     Years: 35 00     Pack years: 105 00     Types: Cigarettes   • Smokeless tobacco: Never   Vaping Use   • Vaping Use: Former   Substance and Sexual Activity   • Alcohol use: Yes     Alcohol/week: 42 0 standard drinks     Types: 42 Cans of beer per week     Comment: a six pack a day   • Drug use: No   • Sexual activity: Not Currently     Partners: Female   Other Topics Concern   • None   Social History Narrative   • None     Social Determinants of Health     Financial Resource Strain: Not on file   Food Insecurity: No Food Insecurity   • Worried About Running Out of Food in the Last Year: Never true   • Ran Out of Food in the Last Year: Never true   Transportation Needs: No Transportation Needs   • Lack of Transportation (Medical): No   • Lack of Transportation (Non-Medical):  No Physical Activity: Not on file   Stress: Not on file   Social Connections: Not on file   Intimate Partner Violence: Not on file   Housing Stability: Low Risk    • Unable to Pay for Housing in the Last Year: No   • Number of Places Lived in the Last Year: 1   • Unstable Housing in the Last Year: No     Social History     Tobacco Use   Smoking Status Every Day   • Packs/day: 3 00   • Years: 35 00   • Pack years: 105 00   • Types: Cigarettes   Smokeless Tobacco Never     Family History   Problem Relation Age of Onset   • Breast cancer Mother    • Prostate cancer Father    • Skin cancer Brother        The following portions of the patient's history were reviewed and updated as appropriate allergies, current medications, past medical history, past social history, past surgical history and problem list    Imagin/25/2023  CT CHEST, ABDOMEN AND PELVIS WITH IV CONTRAST     INDICATION:   Occult malignancy  severe hyponatremia, please look for underlying malignancy      COMPARISON:  2023, 2022 at 2022      TECHNIQUE: CT examination of the chest, abdomen and pelvis was performed  Axial, sagittal, and coronal 2D reformatted images were created from the source data and submitted for interpretation      Radiation dose length product (DLP) for this visit:  633 mGy-cm   This examination, like all CT scans performed in the [facility], was performed utilizing techniques to minimize radiation dose exposure, including the use of iterative   reconstruction and automated exposure control      IV Contrast:  100 mL of iohexol (OMNIPAQUE)  Enteric Contrast: Enteric contrast was administered      FINDINGS:     CHEST     LUNGS:  Mild dependent changes noted right lung base      PLEURA:  Unremarkable      HEART/GREAT VESSELS: Heart is unremarkable for patient's age    No thoracic aortic aneurysm      MEDIASTINUM AND MANE:  Unremarkable      CHEST WALL AND LOWER NECK: Unremarkable      ABDOMEN     LIVER/BILIARY TREE:  Unremarkable      GALLBLADDER:  No calcified gallstones  No pericholecystic inflammatory change      SPLEEN:  Calcified splenic granulomas      PANCREAS:  Large pancreatic head pseudocyst measuring 6 4 x 6 5 cm, stable allowing for differences in measurement technique  Some peripheral calcifications noted as before  Continued prominence of the main pancreatic duct may reflect partial   obstruction from the pseudocyst itself  Again correlate with lipase      ADRENAL GLANDS:  Unremarkable      KIDNEYS/URETERS:  Unremarkable  No hydronephrosis      STOMACH AND BOWEL:  Unremarkable      APPENDIX:  No findings to suggest appendicitis      ABDOMINOPELVIC CAVITY:  No ascites  No pneumoperitoneum  No lymphadenopathy      VESSELS:  Unremarkable for patient's age      PELVIS     REPRODUCTIVE ORGANS:  Unremarkable for patient's age      URINARY BLADDER:  Unremarkable      ABDOMINAL WALL/INGUINAL REGIONS:  Unremarkable      OSSEOUS STRUCTURES:  Chronic right medial clavicular fracture  ORIF right humerus  Chronic sacral and left inferior pubic ramus fractures  Old trauma throughout the spine and ribs  Stable multilevel thoracic vertebral compression fractures with   exaggerated kyphosis  Cervicothoracic fusion hardware noted      IMPRESSION:  1  No suspected malignancy throughout the chest, abdomen or pelvis  2   Chronic pancreatic pseudocyst, stable  3   Old fractures throughout the chest, abdomen and pelvis            Results  No results found for this or any previous visit (from the past 1 hour(s)) ]  Lab Results   Component Value Date    PSA 1 1 10/20/2020     Lab Results   Component Value Date    GLUCOSE 149 (H) 07/28/2017    CALCIUM 9 1 03/06/2023     12/31/2015    K 3 8 03/06/2023    CO2 23 03/06/2023     03/06/2023    BUN 8 03/06/2023    CREATININE 0 56 (L) 03/06/2023     Lab Results   Component Value Date    WBC 5 78 02/28/2023    HGB 11 6 (L) 02/28/2023    HCT 33 3 (L) 02/28/2023     (H) 02/28/2023     02/28/2023       Please Note:  Voice dictation software has been used to create this document  There may be inadvertent transcriptions errors       LORA Johnson 03/16/23

## 2023-03-17 ENCOUNTER — TELEPHONE (OUTPATIENT)
Dept: NEPHROLOGY | Facility: CLINIC | Age: 58
End: 2023-03-17

## 2023-03-17 DIAGNOSIS — E87.1 HYPONATREMIA: ICD-10-CM

## 2023-03-17 DIAGNOSIS — E56.9 VITAMIN DEFICIENCY: Primary | ICD-10-CM

## 2023-03-17 RX ORDER — SODIUM CHLORIDE 1 G/1
2 TABLET ORAL
Qty: 240 TABLET | Refills: 3 | Status: SHIPPED | OUTPATIENT
Start: 2023-03-17 | End: 2023-09-05 | Stop reason: SDUPTHER

## 2023-03-18 ENCOUNTER — LAB (OUTPATIENT)
Dept: LAB | Facility: MEDICAL CENTER | Age: 58
End: 2023-03-18

## 2023-03-18 DIAGNOSIS — N30.00 ACUTE CYSTITIS WITHOUT HEMATURIA: ICD-10-CM

## 2023-03-18 DIAGNOSIS — Z12.5 PROSTATE CANCER SCREENING: ICD-10-CM

## 2023-03-18 DIAGNOSIS — E56.9 VITAMIN DEFICIENCY: ICD-10-CM

## 2023-03-18 LAB
ANION GAP SERPL CALCULATED.3IONS-SCNC: 5 MMOL/L (ref 4–13)
BACTERIA UR QL AUTO: NORMAL /HPF
BILIRUB UR QL STRIP: NEGATIVE
BUN SERPL-MCNC: 6 MG/DL (ref 5–25)
CALCIUM SERPL-MCNC: 9 MG/DL (ref 8.3–10.1)
CHLORIDE SERPL-SCNC: 93 MMOL/L (ref 96–108)
CLARITY UR: CLEAR
CO2 SERPL-SCNC: 25 MMOL/L (ref 21–32)
COLOR UR: COLORLESS
CREAT SERPL-MCNC: 0.61 MG/DL (ref 0.6–1.3)
GFR SERPL CREATININE-BSD FRML MDRD: 111 ML/MIN/1.73SQ M
GLUCOSE P FAST SERPL-MCNC: 59 MG/DL (ref 65–99)
GLUCOSE UR STRIP-MCNC: NEGATIVE MG/DL
HGB UR QL STRIP.AUTO: NEGATIVE
KETONES UR STRIP-MCNC: NEGATIVE MG/DL
LEUKOCYTE ESTERASE UR QL STRIP: NEGATIVE
NITRITE UR QL STRIP: NEGATIVE
NON-SQ EPI CELLS URNS QL MICRO: NORMAL /HPF
PH UR STRIP.AUTO: 6.5 [PH]
POTASSIUM SERPL-SCNC: 4.1 MMOL/L (ref 3.5–5.3)
PROT UR STRIP-MCNC: NEGATIVE MG/DL
PSA SERPL-MCNC: 0.6 NG/ML (ref 0–4)
RBC #/AREA URNS AUTO: NORMAL /HPF
SODIUM SERPL-SCNC: 123 MMOL/L (ref 135–147)
SP GR UR STRIP.AUTO: 1.01 (ref 1–1.03)
UROBILINOGEN UR STRIP-ACNC: <2 MG/DL
WBC #/AREA URNS AUTO: NORMAL /HPF

## 2023-03-19 LAB — BACTERIA UR CULT: NORMAL

## 2023-03-20 ENCOUNTER — TELEPHONE (OUTPATIENT)
Dept: UROLOGY | Facility: CLINIC | Age: 58
End: 2023-03-20

## 2023-03-20 NOTE — TELEPHONE ENCOUNTER
----- Message from Dmitry U  80  sent at 3/20/2023  7:35 AM EDT -----  Please the patient know his urine culture was negative  His PSA was also normal at 0 6

## 2023-03-22 ENCOUNTER — TELEPHONE (OUTPATIENT)
Dept: NEPHROLOGY | Facility: CLINIC | Age: 58
End: 2023-03-22

## 2023-03-22 DIAGNOSIS — E56.9 VITAMIN DEFICIENCY: Primary | ICD-10-CM

## 2023-03-27 ENCOUNTER — TELEPHONE (OUTPATIENT)
Dept: NEPHROLOGY | Facility: CLINIC | Age: 58
End: 2023-03-27

## 2023-03-27 ENCOUNTER — PATIENT OUTREACH (OUTPATIENT)
Dept: FAMILY MEDICINE CLINIC | Facility: CLINIC | Age: 58
End: 2023-03-27

## 2023-03-27 ENCOUNTER — APPOINTMENT (OUTPATIENT)
Dept: LAB | Facility: MEDICAL CENTER | Age: 58
End: 2023-03-27

## 2023-03-27 DIAGNOSIS — I10 ESSENTIAL HYPERTENSION: Primary | ICD-10-CM

## 2023-03-27 DIAGNOSIS — E56.9 VITAMIN DEFICIENCY: ICD-10-CM

## 2023-03-27 LAB
ANION GAP SERPL CALCULATED.3IONS-SCNC: 5 MMOL/L (ref 4–13)
BUN SERPL-MCNC: 5 MG/DL (ref 5–25)
CALCIUM SERPL-MCNC: 9.5 MG/DL (ref 8.3–10.1)
CHLORIDE SERPL-SCNC: 95 MMOL/L (ref 96–108)
CO2 SERPL-SCNC: 27 MMOL/L (ref 21–32)
CREAT SERPL-MCNC: 0.62 MG/DL (ref 0.6–1.3)
GFR SERPL CREATININE-BSD FRML MDRD: 110 ML/MIN/1.73SQ M
GLUCOSE P FAST SERPL-MCNC: 110 MG/DL (ref 65–99)
POTASSIUM SERPL-SCNC: 3.8 MMOL/L (ref 3.5–5.3)
SODIUM SERPL-SCNC: 127 MMOL/L (ref 135–147)

## 2023-03-27 NOTE — PROGRESS NOTES
Outpatient Care Management Note:  Follow up call placed to [facility]  He had repeat lab work completed this morning after increasing his NACL tablets to 2 grams TID due to his sodium decreasing again  States he feels good  Blood sugars have been stable  He is maintaining his fluid restrictions  Remains on Midodrine for his BP  He is questioning this as his BP at a recent urology visit was 134/90  He does not have a BP cuff but plans on obtaining one this week  Encouraged him to do so and keep a record of his BP daily to present to Nephrology at an upcoming visit  Denies any needs at this time  Encouraged to call with any questions or concerns

## 2023-04-06 ENCOUNTER — APPOINTMENT (OUTPATIENT)
Dept: LAB | Facility: MEDICAL CENTER | Age: 58
End: 2023-04-06

## 2023-04-06 DIAGNOSIS — G47.00 INSOMNIA, UNSPECIFIED TYPE: ICD-10-CM

## 2023-04-06 DIAGNOSIS — F10.10 ALCOHOL ABUSE: ICD-10-CM

## 2023-04-06 DIAGNOSIS — I10 ESSENTIAL HYPERTENSION: ICD-10-CM

## 2023-04-06 DIAGNOSIS — E55.9 VITAMIN D DEFICIENCY: ICD-10-CM

## 2023-04-06 DIAGNOSIS — E53.8 FOLIC ACID DEFICIENCY: ICD-10-CM

## 2023-04-06 LAB
ANION GAP SERPL CALCULATED.3IONS-SCNC: 1 MMOL/L (ref 4–13)
BUN SERPL-MCNC: 6 MG/DL (ref 5–25)
CALCIUM SERPL-MCNC: 9.2 MG/DL (ref 8.3–10.1)
CHLORIDE SERPL-SCNC: 102 MMOL/L (ref 96–108)
CO2 SERPL-SCNC: 26 MMOL/L (ref 21–32)
CREAT SERPL-MCNC: 0.67 MG/DL (ref 0.6–1.3)
GFR SERPL CREATININE-BSD FRML MDRD: 107 ML/MIN/1.73SQ M
GLUCOSE P FAST SERPL-MCNC: 55 MG/DL (ref 65–99)
POTASSIUM SERPL-SCNC: 4 MMOL/L (ref 3.5–5.3)
SODIUM SERPL-SCNC: 129 MMOL/L (ref 135–147)

## 2023-04-06 RX ORDER — ERGOCALCIFEROL 1.25 MG/1
50000 CAPSULE ORAL WEEKLY
Qty: 13 CAPSULE | Refills: 1 | Status: SHIPPED | OUTPATIENT
Start: 2023-04-06

## 2023-04-06 RX ORDER — LORAZEPAM 0.5 MG/1
0.5 TABLET ORAL
Qty: 30 TABLET | Refills: 0 | Status: SHIPPED | OUTPATIENT
Start: 2023-04-06

## 2023-04-06 RX ORDER — UBIDECARENONE 75 MG
100 CAPSULE ORAL DAILY
Qty: 30 TABLET | Refills: 5 | Status: SHIPPED | OUTPATIENT
Start: 2023-04-06

## 2023-04-06 RX ORDER — FOLIC ACID 1 MG/1
1 TABLET ORAL DAILY
Qty: 30 TABLET | Refills: 0 | Status: SHIPPED | OUTPATIENT
Start: 2023-04-06

## 2023-04-25 ENCOUNTER — PATIENT OUTREACH (OUTPATIENT)
Dept: CASE MANAGEMENT | Facility: HOSPITAL | Age: 58
End: 2023-04-25

## 2023-04-25 NOTE — PROGRESS NOTES
Follow up call placed to pt for care management  Reminder left on voicemail that pt has order for blood work and to obtain when able  Contact information for this RN CM as well as Cathryn Mayers RN CM for call back

## 2023-04-27 PROBLEM — N30.01 ACUTE CYSTITIS WITH HEMATURIA: Status: RESOLVED | Noted: 2023-02-25 | Resolved: 2023-04-27

## 2023-05-02 ENCOUNTER — PATIENT OUTREACH (OUTPATIENT)
Dept: FAMILY MEDICINE CLINIC | Facility: CLINIC | Age: 58
End: 2023-05-02

## 2023-05-02 NOTE — PROGRESS NOTES
"Outpatient Care Management Note:  Follow up call to Mary Kay Stern  placed  Mary Kay Stern is feeling well  He has an appointment with his PCP on 5/3/2023  He will have his blood work obtained prior to that appointment  He is having some right shoulder pain and will be requesting an X-ray  He fell over his garbage can and is concerned he may have \"thrown it out  \"   Also continues to have his chronic low back pain but it is no worse than usual   Denies any needs at this time  Encouraged to call with any questions or concerns     "

## 2023-05-03 ENCOUNTER — APPOINTMENT (OUTPATIENT)
Dept: LAB | Facility: MEDICAL CENTER | Age: 58
End: 2023-05-03

## 2023-05-03 DIAGNOSIS — I10 HYPERTENSION, UNSPECIFIED TYPE: ICD-10-CM

## 2023-05-03 LAB
ANION GAP SERPL CALCULATED.3IONS-SCNC: 6 MMOL/L (ref 4–13)
BUN SERPL-MCNC: 7 MG/DL (ref 5–25)
CALCIUM SERPL-MCNC: 9.5 MG/DL (ref 8.3–10.1)
CHLORIDE SERPL-SCNC: 100 MMOL/L (ref 96–108)
CO2 SERPL-SCNC: 27 MMOL/L (ref 21–32)
CREAT SERPL-MCNC: 0.66 MG/DL (ref 0.6–1.3)
GFR SERPL CREATININE-BSD FRML MDRD: 108 ML/MIN/1.73SQ M
GLUCOSE P FAST SERPL-MCNC: 92 MG/DL (ref 65–99)
POTASSIUM SERPL-SCNC: 3.9 MMOL/L (ref 3.5–5.3)
SODIUM SERPL-SCNC: 133 MMOL/L (ref 135–147)

## 2023-05-04 ENCOUNTER — TELEPHONE (OUTPATIENT)
Dept: NEPHROLOGY | Facility: CLINIC | Age: 58
End: 2023-05-04

## 2023-05-04 ENCOUNTER — OFFICE VISIT (OUTPATIENT)
Dept: GASTROENTEROLOGY | Facility: CLINIC | Age: 58
End: 2023-05-04

## 2023-05-04 VITALS
WEIGHT: 120 LBS | DIASTOLIC BLOOD PRESSURE: 107 MMHG | SYSTOLIC BLOOD PRESSURE: 163 MMHG | TEMPERATURE: 97.9 F | HEART RATE: 75 BPM | HEIGHT: 64 IN | BODY MASS INDEX: 20.49 KG/M2

## 2023-05-04 DIAGNOSIS — K22.70 BARRETT'S ESOPHAGUS WITHOUT DYSPLASIA: Primary | ICD-10-CM

## 2023-05-04 DIAGNOSIS — K86.3 PSEUDOCYST OF PANCREAS: ICD-10-CM

## 2023-05-04 DIAGNOSIS — R93.5 ABNORMAL FINDINGS ON DIAGNOSTIC IMAGING OF ABDOMEN: ICD-10-CM

## 2023-05-04 DIAGNOSIS — F17.200 TOBACCO USE DISORDER: ICD-10-CM

## 2023-05-04 DIAGNOSIS — Z86.010 PERSONAL HISTORY OF COLONIC POLYPS: ICD-10-CM

## 2023-05-04 DIAGNOSIS — K86.0 ALCOHOL-INDUCED CHRONIC PANCREATITIS (HCC): ICD-10-CM

## 2023-05-04 DIAGNOSIS — K70.30 ALCOHOLIC CIRRHOSIS OF LIVER WITHOUT ASCITES (HCC): ICD-10-CM

## 2023-05-04 RX ORDER — OMEPRAZOLE 20 MG/1
20 CAPSULE, DELAYED RELEASE ORAL DAILY
Qty: 30 CAPSULE | Refills: 11 | Status: SHIPPED | OUTPATIENT
Start: 2023-05-04

## 2023-05-04 NOTE — PATIENT INSTRUCTIONS
Please restart omeprazole in the morning daily  This is to protect your esophagus because you do have a diagnosis of Traore's esophagus, which is a change to the bottom of the esophagus from inflammation and damage and which can progress to cancer  Because of the Traore's esophagus and the CT findings of the esophagus, I do recommend an upper endoscopy  We can rediscuss this at your next office visit  For the liver history, we will obtain a special ultrasound of the liver now and blood work  Keep up the excellent work of avoiding alcohol  You do have a personal history of colon polyps as well, and the doctors recommended a repeat colonoscopy in 2023  We will once again discuss this at the next office visit  You do have a large pancreas cyst, the right now based upon our discussion you are not having any symptoms  We should at least keep an eye on this with an MRI in the next couple of months  I will also talk about the cyst to one of my advanced endoscopy doctors        2 month appt made per Sara Howard, ultrasounds made, labs given to patient

## 2023-05-04 NOTE — PROGRESS NOTES
[facility] - Outpatient Follow-up Note  Josh Chavez 65 y o  male MRN: [id number]  Encounter: [id number]    ASSESSMENT AND PLAN:      1  Traore's esophagus without dysplasia  2  Abnormal findings on diagnostic imaging of abdomen    Patient with a personal history of Traore's esophagus did have abnormal CT findings from 2/2023 which did demonstrate a mildly patulous esophagus with air-fluid levels  He denies any dysphagia or early satiety or weight loss  We reviewed that he is to restart his PPI daily for his history of Traore's esophagus  We reviewed that he is due for surveillance endoscopy as well as to evaluate this finding in greater detail  Patient politely declines endoscopic evaluation at this time  He would like to readdress this in a couple of months  I have encouraged him to continue with diet and lifestyle modifications for GERD, continue to work on cutting back on  tobacco use, and if he has any new or alarming features he should contact office immediately  - omeprazole (PriLOSEC) 20 mg delayed release capsule; Take 1 capsule (20 mg total) by mouth daily  Dispense: 30 capsule; Refill: 11    3  Personal history of colonic polyps    Patient does have a personal history of adenomatous colon polyps  His last colonoscopy in 01/2020 demonstrated 2 small adenomatous polyps, and 1 of these could have been residual polyp tissue from a previous piecemeal polypectomy  For this reason, recommended a 3 year repeat and she would be due now  At this time, pt politely defers until next office visit  4  Alcoholic cirrhosis of liver without ascites ([facility] )    Patient does carry this diagnosis on his problem list, though I wonder if he does truly have cirrhosis versus advanced fibrosis  Nevertheless, I do think she probably has some degree of advanced to chronic liver disease  The etiology is likely secondary to EtOH abuse, though he has been sober x1 year      At this time, I do think it is reasonable to check an ultrasound and an elastography to further characterize his degree of liver disease  We will repeat LFTs, INR, and check for immunity to hepatitis A and B now  He is due for repeat EGD for surveillance of his Traore's, and this can be utilized as variceal screening as well  MELD-Na: Update labs now to calculate score  Ascites: No evidence of ascites or volume overload on examination, general recommendations for cirrhotics are to continue low-sodium diet, though per nephrology they are supplementing him with sodium tablets given his hyponatremia, component of which may be secondary to his chronic liver disease  Nephrology suspected his hyponatremia was secondary to SIADH and volume depletion  Esophageal varices: None evidenced on previous endoscopy from 2020, he will be due for surveillance for his Traore's and for EV  Hepatic encephalopathy: None evidenced on examination  [place] [id number]  screening: We will repeat abdominal ultrasound now and check AFP    He may benefit from establishing with hepatology pending results of the investigation     - CBC and Platelet; Future  - Comprehensive metabolic panel; Future  - Protime-INR; Future  - Hepatitis A antibody, total; Future  - Hepatitis B surface antibody; Future  - Iron Panel (Includes Ferritin, Iron Sat%, Iron, and TIBC); Future  - AFP tumor marker; Future  - US right upper quadrant; Future  - US elastography/UGAP; Future    5  Pseudocyst of pancreas  6   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 [facility] )    Patient does have a history of pancreatitis secondary to EtOH and now has parenchymal findings consistent with chronic pancreatitis with a pseudocyst   Interestingly, patient does not have any obvious GI symptoms despite the somewhat large dimensions of the pseudocyst   Generally speaking, large asymptomatic pseudocyst may be monitored with serial imaging, though given prominence of the main PD I will discuss these findings with advanced endoscopy to determine whether any intervention is recommended  For now, would recommend repeating MRI MRCP in approximately 3-6 months  He does have evidence of changes to the pancreas suggestive of chronic pancreatitis, though is not having any steatorrhea or abdominal pain  Encourage patient to stop smoking  7  Tobacco use disorder    Encourage cessation, risk for progression of chronic panc  We will follow up in approx 2-3 months to review symptoms, order EGD/Colon and MRI  Patient follow-up with nephrology regarding history of hyponatremia, as well as midodrine use, which he at present does not require   ______________________________________________________________________    SUBJECTIVE: Patient is a 65 y o  male who presents today for follow-up regarding abnormal finding on CT scan  Past medical history significant for EtOH cirrhosis, Traore's esophagus, history of peptic ulcer disease, chronic pancreatitis with pseudocyst, COPD, HTN, seizure disorder, tobacco use disorder  Patient is new to me  He previously followed with Dr Jim Harden, last seen in 08/2022  He was most recently hospitalized in 02/2023 and 03/2023 with hyponatremia  During both hospitalizations, there were incidental abnormal findings of GI tract noted, noting a large pancreatic head pseudocyst with peripheral calcifications and prominence of the main PD, as well as a dilated distal esophagus  05/04/23:     Patient has been feeling much better since discharge from hospital   He notes only rare heartburn with spicy intake, denies any significant indigestion, nausea, vomiting, postprandial abdominal pain  His weight has been stable, in fact he shares he was able to gain weight over the past couple of months  He feels his appetite is good  He denies any dysphagia, odynophagia, early satiety  He is still smoking 1 5 PPD  He has been sober of alcohol for approximately 1 year      He shares he is moving his bowels about once daily  He denies any straining, BRBPR or melena  No chronic diarrhea or steatorrhea  No pain related to defecation  He denies any yellowing of the eyes or the skin  He denies any new abdominal swelling  No new lower extremity edema or acholic stools  Etoh: sober x 1 year   Tobacco: 1 5 PPD   NSAIDs: avoids    02/2023: Hb 11 6, Hct 33 3, , Plt 168, Na 126, BUN 12, Cr 0 71   02/2023: CT Chest:   Increased size of the partially visualized 6 9 cm pancreatic head pseudocyst since 5/23/2022 and new mild dilatation of the main pancreatic duct, likely due to mass effect from the pancreatic head pseudocyst  Mildly patulous esophagus with air-fluid levels  02/2023: CT C/A/P: Large pancreatic head pseudocyst measuring 6 4 x 6 5 cm, stable allowing for differences in measurement technique  Some peripheral calcifications noted as before  Continued prominence of the main pancreatic duct may reflect partial obstruction from the pseudocyst itself    Endoscopic history:  EGD: 07/2020: Irregular Z line with findings consistent with short segment Traore's esophagus; normal stomach and duodenum  EGD/EUS: 09/2020: Parenchymal changes in the pancreas suggestive but not diagnostic for chronic pancreatitis  No cysts identified so these likely were acute inflammatory pancreatic collections which have resolved  Colon: 01/2020: two small ascending colon polyps; Repeat in 3 years due to a personal history of colon polyps (because one of the ascending polyps may have been residual from his previous piecemeal polypectomy  D  Colon, Ascending (Polyp x 2), Biopsy: Fragments of tubular adenoma       Review of Systems   Otherwise Per HPI    Historical Information   Past Medical History:   Diagnosis Date    Alcohol abuse     Traore esophagus     [facility]el obstruction [facility] )     Bowel perforation ([facility] )     Cardiac disease     Continuous chronic alcoholism ([facility] ) 10/5/2017    COPD (chronic obstructive [facility] disease) ([email or web address]     History of shoulder surgery     Right shoulder    History of transfusion     Hx of cervical spine surgery     Hypertension     Incisional hernia 10/8/2017    MI, old     Mitral regurgitation     Psychiatric disorder     Seizures ([email or web address] )      Past Surgical History:   Procedure Laterality Date    APPENDECTOMY      BACK SURGERY      CHOLECYSTECTOMY      ESOPHAGOGASTRODUODENOSCOPY N/A 11/28/2016    Procedure: ESOPHAGOGASTRODUODENOSCOPY (EGD); Surgeon: Lanny Frye MD;  Location:  GI LAB;   Service:     GALLBLADDER SURGERY      LAPAROTOMY N/A 10/25/2016    Procedure: LAPAROTOMY EXPLORATORY;  Surgeon: Antonina Crigler, MD;  Location: [facility];  Service:     MOUTH SURGERY      PERCUTANEOUS PINNING FEMORAL NECK FRACTURE      SHOULDER SURGERY Right     SHOULDER SURGERY      SMALL INTESTINE SURGERY      STOMACH SURGERY      bal surgery     Social History   Social History     Substance and Sexual Activity   Alcohol Use Yes    Alcohol/week: 42 0 standard drinks    Types: 42 Cans of beer per week    Comment: a six pack a day     Social History     Substance and Sexual Activity   Drug Use No     Social History     Tobacco Use   Smoking Status Every Day    Packs/day: 3 00    Years: 35 00    Pack years: 105 00    Types: Cigarettes   Smokeless Tobacco Never     Family History   Problem Relation Age of Onset    Breast cancer Mother     Prostate cancer Father     Skin cancer Brother      Meds/Allergies       Current Outpatient Medications:     albuterol (Ventolin HFA) 90 mcg/act inhaler    Cholecalciferol 25 MCG (1000 UT) tablet    cyanocobalamin (VITAMIN B-12) 100 mcg tablet    Diclofenac Sodium (VOLTAREN) 1 %    docusate sodium (COLACE) 100 mg capsule    ergocalciferol (VITAMIN D2) 35,728 units    folic acid (FOLVITE) 1 mg tablet    levETIRAcetam (KEPPRA) 1000 MG tablet    lisinopril (ZESTRIL) 2 5 mg tablet    LORazepam (ATIVAN) 0 5 mg tablet    midodrine "(PROAMATINE) 5 mg tablet    Multiple Vitamin (TAB-A-BONI PO)    multivitamin (THERAGRAN) TABS    nicotine (NICODERM CQ) 14 mg/24hr TD 24 hr patch    sodium chloride 1 g tablet    thiamine 100 MG tablet    No Known Allergies    Objective     Blood pressure (!) 163/107, pulse 75, temperature 97 9 °F (36 6 °C), height 5' 4\" (1 626 m), weight 54 4 kg (120 lb)  Body mass index is 20 6 kg/m²  Physical Exam  Vitals and nursing note reviewed  Constitutional:       Appearance: Normal appearance  Comments: Chronically ill-appearing   HENT:      Head: Normocephalic and atraumatic  Cardiovascular:      Rate and Rhythm: Normal rate  Pulmonary:      Effort: Pulmonary effort is normal       Comments: Decreased breath sounds at bilateral bases  Abdominal:      General: Bowel sounds are normal  There is no distension  Palpations: Abdomen is soft  Tenderness: There is no abdominal tenderness  There is no guarding or rebound  Musculoskeletal:      Right lower leg: No edema  Left lower leg: No edema  Skin:     General: Skin is warm and dry  Coloration: Skin is not jaundiced  Neurological:      General: No focal deficit present  Mental Status: He is alert and oriented to person, place, and time  Comments: No asterixis   Psychiatric:         Mood and Affect: Mood normal          Behavior: Behavior normal        Lab Results:   No visits with results within 1 Day(s) from this visit  Latest known visit with results is:   Appointment on 05/03/2023   Component Date Value    Sodium 05/03/2023 133 (L)     Potassium 05/03/2023 3 9     Chloride 05/03/2023 100     CO2 05/03/2023 27     ANION GAP 05/03/2023 6     BUN 05/03/2023 7     Creatinine 05/03/2023 0 66     Glucose, Fasting 05/03/2023 92     Calcium 05/03/2023 9 5     eGFR 05/03/2023 108      Radiology Results:   No results found      Chi Chávez PA-C    **Please note:  Dictation voice to text software may have been used " in the creation of this record  Occasional wrong word or sound alike substitutions may have occurred due to the inherent limitations of voice recognition software  Read the chart carefully and recognize, using context, where substitutions have occurred  **

## 2023-05-08 ENCOUNTER — TELEPHONE (OUTPATIENT)
Dept: NEPHROLOGY | Facility: CLINIC | Age: 58
End: 2023-05-08

## 2023-05-08 DIAGNOSIS — E83.42 HYPOMAGNESEMIA: Primary | ICD-10-CM

## 2023-05-09 ENCOUNTER — APPOINTMENT (OUTPATIENT)
Dept: RADIOLOGY | Facility: MEDICAL CENTER | Age: 58
End: 2023-05-09

## 2023-05-09 ENCOUNTER — OFFICE VISIT (OUTPATIENT)
Dept: FAMILY MEDICINE CLINIC | Facility: CLINIC | Age: 58
End: 2023-05-09

## 2023-05-09 VITALS
RESPIRATION RATE: 18 BRPM | SYSTOLIC BLOOD PRESSURE: 128 MMHG | HEIGHT: 64 IN | OXYGEN SATURATION: 96 % | TEMPERATURE: 96.5 F | WEIGHT: 124.8 LBS | DIASTOLIC BLOOD PRESSURE: 95 MMHG | HEART RATE: 96 BPM | BODY MASS INDEX: 21.31 KG/M2

## 2023-05-09 DIAGNOSIS — I10 ESSENTIAL HYPERTENSION: ICD-10-CM

## 2023-05-09 DIAGNOSIS — Z00.00 ANNUAL PHYSICAL EXAM: ICD-10-CM

## 2023-05-09 DIAGNOSIS — G47.00 INSOMNIA, UNSPECIFIED TYPE: ICD-10-CM

## 2023-05-09 DIAGNOSIS — M25.511 ACUTE PAIN OF RIGHT SHOULDER: ICD-10-CM

## 2023-05-09 DIAGNOSIS — E87.1 CHRONIC HYPONATREMIA: ICD-10-CM
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
## 2023-05-09 DIAGNOSIS — E83.42 HYPOMAGNESEMIA: ICD-10-CM

## 2023-05-09 DIAGNOSIS — M25.511 ACUTE PAIN OF RIGHT SHOULDER: Primary | ICD-10-CM

## 2023-05-09 RX ORDER — PREDNISONE 20 MG/1
40 TABLET ORAL DAILY
Qty: 10 TABLET | Refills: 0 | Status: SHIPPED | OUTPATIENT
Start: 2023-05-09 | End: 2023-05-14

## 2023-05-09 RX ORDER — PREDNISONE 20 MG/1
40 TABLET ORAL DAILY
Qty: 5 TABLET | Refills: 0 | Status: SHIPPED | OUTPATIENT
Start: 2023-05-09 | End: 2023-05-09 | Stop reason: SDUPTHER

## 2023-05-09 RX ORDER — HYDROXYZINE 50 MG/1
50 TABLET, FILM COATED ORAL DAILY
Qty: 30 TABLET | Refills: 1 | Status: SHIPPED | OUTPATIENT
Start: 2023-05-09 | End: 2023-05-09 | Stop reason: CLARIF

## 2023-05-09 RX ORDER — LORAZEPAM 1 MG/1
1 TABLET ORAL
Qty: 30 TABLET | Refills: 0 | Status: SHIPPED | OUTPATIENT
Start: 2023-05-09

## 2023-05-09 NOTE — PROGRESS NOTES
[address] [facility]    NAME: Emre Huerta  AGE: 65 y o  SEX: male  : 1965     DATE: 2023     Assessment and Plan:     Problem List Items Addressed This Visit        Cardiovascular and Mediastinum    Essential hypertension       Other    Chronic hyponatremia    Hypomagnesemia    Fall    Relevant Orders    XR spine lumbar 2 or 3 views injury    Insomnia    Relevant Medications    LORazepam (ATIVAN) 1 mg tablet   Other Visit Diagnoses     Acute pain of right shoulder    -  Primary    Relevant Medications    predniSONE 20 mg tablet    Other Relevant Orders    XR shoulder 2+ vw right    Ambulatory Referral to Orthopedic Surgery    Ambulatory Referral to Physical Therapy    Annual physical exam              Immunizations and preventive care screenings were discussed with patient today  Appropriate education was printed on patient's after visit summary  {Prostate cancer screening - consider in males between age of 43-69 depending on age, race, and risk factors  There is difference in the guidelines in regards to the optimal age for screening  USPSTF states to consider periodic screening in males between the age of 48 to 71  This text is informational and does not need to be selected but if you wish, you can insert standard documentation in your progress note by using F2 (Optional):39451}    Counseling:  · {Annual Physical; Counselin}         No follow-ups on file  Chief Complaint:     Chief Complaint   Patient presents with   • Well Check   • Annual Exam      History of Present Illness:     Adult Annual Physical   Patient here for a comprehensive physical exam  The patient reports {problems:54917}  Diet and Physical Activity  · Diet/Nutrition: {annual physical; diet:61085278}  · Exercise: {annual physical; exercise:14265457}        Depression Screening  PHQ-2/9 Depression Screening         General "Health  · Sleep: {annual physical; sleep:2102}  · Hearing: {annual physical; hearin}  · Vision: {annual physical; vision:}  · Dental: {annual physical; dental:}   Health  · Symptoms include: {annual physical; urinary symptoms:67725::\"none\"}     Review of Systems:     Review of Systems   Past Medical History:     Past Medical History:   Diagnosis Date   • Alcohol abuse    • Traore esophagus    • Bowel obstruction (HCC)    • Bowel perforation ([facility] )    • Cardiac disease    • Continuous chronic alcoholism ([facility] ) 10/5/2017   • COPD (chronic obstructive pulmonary disease) (HCC)    • History of shoulder surgery     Right shoulder   • History of transfusion    • Hx of cervical spine surgery    • Hypertension    • Incisional hernia 10/8/2017   • MI, old    • Mitral regurgitation    • Psychiatric disorder    • Seizures ([facility] )       Past Surgical History:     Past Surgical History:   Procedure Laterality Date   • APPENDECTOMY     • BACK SURGERY     • CHOLECYSTECTOMY     • ESOPHAGOGASTRODUODENOSCOPY N/A 2016    Procedure: ESOPHAGOGASTRODUODENOSCOPY (EGD); Surgeon: Georgie Marquez MD;  [facility];   Service:    • GALLBLADDER SURGERY     • LAPAROTOMY N/A 10/25/2016    Procedure: LAPAROTOMY EXPLORATORY;  Surgeon: Bernie Vega MD;  Location: [facility];  Service:    • MOUTH SURGERY     • PERCUTANEOUS PINNING FEMORAL NECK FRACTURE     • SHOULDER SURGERY Right    • SHOULDER SURGERY     • SMALL INTESTINE SURGERY     • STOMACH SURGERY      bal surgery      Family History:     Family History   Problem Relation Age of Onset   • Breast cancer Mother    • Prostate cancer Father    • Skin cancer Brother       Social History:     Social History     Socioeconomic History   • Marital status: Single     Spouse name: None   • Number of children: None   • Years of education: None   • Highest education level: None   Occupational History   • None   Tobacco Use   • Smoking status: Every " Day     Packs/day: 3 00     Years: 35 00     Pack years: 105 00     Types: Cigarettes   • Smokeless tobacco: Never   Vaping Use   • Vaping Use: Former   Substance and Sexual Activity   • Alcohol use: Yes     Alcohol/week: 42 0 standard drinks     Types: 42 Cans of beer per week     Comment: a six pack a day   • Drug use: No   • Sexual activity: Not Currently     Partners: Female   Other Topics Concern   • None   Social History Narrative   • None     Social Determinants of Health     Financial Resource Strain: Not on file   Food Insecurity: No Food Insecurity   • Worried About Running Out of Food in the Last Year: Never true   • Ran Out of Food in the Last Year: Never true   Transportation Needs: No Transportation Needs   • Lack of Transportation (Medical): No   • Lack of Transportation (Non-Medical):  No   Physical Activity: Not on file   Stress: Not on file   Social Connections: Not on file   Intimate Partner Violence: Not on file   Housing Stability: Low Risk    • Unable to Pay for Housing in the Last Year: No   • Number of Places Lived in the Last Year: 1   • Unstable Housing in the Last Year: No      Current Medications:     Current Outpatient Medications   Medication Sig Dispense Refill   • albuterol (Ventolin HFA) 90 mcg/act inhaler Inhale 2 puffs every 6 (six) hours as needed for wheezing 8 g 1   • Cholecalciferol 25 MCG (1000 UT) tablet Take 1 tablet (1,000 Units total) by mouth daily 30 tablet 0   • cyanocobalamin (VITAMIN B-12) 100 mcg tablet Take 1 tablet (100 mcg total) by mouth daily 30 tablet 5   • Diclofenac Sodium (VOLTAREN) 1 % Apply 2 g topically 4 (four) times a day 100 g 1   • docusate sodium (COLACE) 100 mg capsule Take 1 capsule (100 mg total) by mouth 2 (two) times a day as needed for constipation 30 capsule 0   • ergocalciferol (VITAMIN D2) 50,000 units Take 1 capsule (50,000 Units total) by mouth once a week 13 capsule 1   • folic acid (FOLVITE) 1 mg tablet Take 1 tablet (1 mg total) by mouth "daily 30 tablet 0   • levETIRAcetam (KEPPRA) 1000 MG tablet Take 1 tablet (1,000 mg total) by mouth every 12 (twelve) hours 60 tablet 2   • lisinopril (ZESTRIL) 2 5 mg tablet Take 1 tablet (2 5 mg total) by mouth daily 90 tablet 2   • LORazepam (ATIVAN) 1 mg tablet Take 1 tablet (1 mg total) by mouth daily at bedtime 30 tablet 0   • midodrine (PROAMATINE) 5 mg tablet Take 1 tablet (5 mg total) by mouth 3 (three) times a day before meals 90 tablet 0   • Multiple Vitamin (TAB-A-BONI PO) Take 1 tablet by mouth daily     • multivitamin (THERAGRAN) TABS Take 1 tablet by mouth daily     • omeprazole (PriLOSEC) 20 mg delayed release capsule Take 1 capsule (20 mg total) by mouth daily 30 capsule 11   • predniSONE 20 mg tablet Take 2 tablets (40 mg total) by mouth daily for 5 days 10 tablet 0   • sodium chloride 1 g tablet Take 2 tablets (2 g total) by mouth 3 (three) times a day with meals 240 tablet 3   • thiamine 100 MG tablet Take 1 tablet (100 mg total) by mouth daily 30 tablet 0     No current facility-administered medications for this visit        Allergies:     No Known Allergies   Physical Exam:     /95 (BP Location: Left arm, Patient Position: Sitting, Cuff Size: Standard)   Pulse 96   Temp (!) 96 5 °F (35 8 °C)   Resp 18   Ht 5' 4\" (1 626 m)   Wt 56 6 kg (124 lb 12 8 oz)   SpO2 96%   BMI 21 42 kg/m²     Physical Exam     Dima Solomon DO  [address]  "

## 2023-05-09 NOTE — PROGRESS NOTES
Assessment/Plan:     Problem List Items Addressed This Visit        Cardiovascular and Mediastinum    Essential hypertension - Primary       Other    Chronic hyponatremia    Hypomagnesemia    Fall    Relevant Orders    XR spine lumbar 2 or 3 views injury    Insomnia    Relevant Medications    LORazepam (ATIVAN) 1 mg tablet   Other Visit Diagnoses     Annual physical exam        Acute pain of right shoulder        Relevant Medications    predniSONE 20 mg tablet    Other Relevant Orders    XR shoulder 2+ vw right    Ambulatory Referral to Orthopedic Surgery    Ambulatory Referral to Physical Therapy            Patient hemodynamically stable and clinically well-appearing   Patient is following up with nephrology and GI  Patient has a US right upper quadrant ultrasound along with a US elastography tomorrow due to history of alcoholic liver cirrhosis and Traore's esophagus  Patient encouraged to continue to take Prilosec 20 mg every day  This patient has a history of chronic hyponatremia  Patient encouraged to repeat his BMP and follow-up with nephrology within the next appointment which is May 30  The patient's shoulder pain he will benefit from physical therapy along with orthopedic surgery referral   Patient also will get x-ray done  I have given patient a short course of steroids to help with symptomatic relief  We will also increase patient's Ativan from 0 5 to 1 mg to help with sleep  Consider other methods to help with insomnia in the future  Subjective:      Patient ID: Shaheen Number is a 65 y o  male past medical history of chronic hyponatremia, GERD, diabetes and COPD presents to the office for follow-up visit  Patient currently still complaining of right shoulder pain  Patient indicates that he fell while taking out again which can  Patient had issues with his shoulder in the past   Patient has been using Tylenol for symptomatic relief    Otherwise patient is on Ativan 0 5 mg for sleep and indicates that he has not been able to adequately sleep on this medication dose and would love an increase  He follows with nephrology and GI secondary to comorbid symptoms of chronic hyponatremia and history of alcoholic liver cirrhosis  Shoulder Pain   The pain is present in the right shoulder  This is a recurrent problem  The current episode started 1 to 4 weeks ago  There has been a history of trauma  The problem occurs constantly  The problem has been unchanged  The pain is at a severity of 6/10  The pain is mild  Associated symptoms include joint locking, a limited range of motion and stiffness  Pertinent negatives include no fever, inability to bear weight, joint swelling or numbness  The symptoms are aggravated by activity  He has tried acetaminophen for the symptoms  The treatment provided mild relief  Family history does not include gout or rheumatoid arthritis  The following portions of the patient's history were reviewed and updated as appropriate: allergies, current medications, past family history, past medical history, past social history, past surgical history and problem list     Review of Systems   Constitutional: Negative for chills and fever  HENT: Negative for ear pain and sore throat  Eyes: Negative for pain and visual disturbance  Respiratory: Negative for cough and shortness of breath  Cardiovascular: Negative for chest pain and palpitations  Gastrointestinal: Negative for abdominal pain and vomiting  Genitourinary: Negative for dysuria and hematuria  Musculoskeletal: Positive for arthralgias, back pain, neck stiffness and stiffness  Skin: Negative for color change and rash  Neurological: Negative for seizures, syncope and numbness  Psychiatric/Behavioral:        Insomnia    All other systems reviewed and are negative          Objective:    /95 (BP Location: Left arm, Patient Position: Sitting, Cuff Size: Standard)   Pulse 96   Temp (!) 96 5 °F (35 8 "°C)   Resp 18   Ht 5' 4\" (1 626 m)   Wt 56 6 kg (124 lb 12 8 oz)   SpO2 96%   BMI 21 42 kg/m²        Physical Exam  Constitutional:       Appearance: Normal appearance  HENT:      Head: Normocephalic and atraumatic  Right Ear: Tympanic membrane and ear canal normal       Left Ear: Tympanic membrane and ear canal normal       Nose: Nose normal       Mouth/Throat:      Mouth: Mucous membranes are moist       Pharynx: No oropharyngeal exudate or posterior oropharyngeal erythema  Cardiovascular:      Rate and Rhythm: Normal rate  Pulses: Normal pulses  Pulmonary:      Effort: Pulmonary effort is normal    Abdominal:      General: Abdomen is flat  Palpations: Abdomen is soft  Musculoskeletal:         General: Tenderness and signs of injury present  Comments: Patient with a history of frozen shoulder of the right shoulder  Likely symptoms of frozen shoulder  There is tenderness to touch around the right shoulder  Patient also exhibiting signs of low back pain  Motion compromise, flexion and extension standpoint  Patient has had a significant history of spine deformity and discomfort   Skin:     General: Skin is warm  Capillary Refill: Capillary refill takes less than 2 seconds  Neurological:      General: No focal deficit present  Mental Status: He is alert and oriented to person, place, and time           "

## 2023-05-10 ENCOUNTER — APPOINTMENT (OUTPATIENT)
Dept: LAB | Facility: HOSPITAL | Age: 58
End: 2023-05-10

## 2023-05-10 ENCOUNTER — HOSPITAL ENCOUNTER (OUTPATIENT)
Dept: ULTRASOUND IMAGING | Facility: HOSPITAL | Age: 58
Discharge: HOME | End: 2023-05-10

## 2023-05-10 DIAGNOSIS — K70.30 ALCOHOLIC CIRRHOSIS OF LIVER WITHOUT ASCITES (HCC): ICD-10-CM

## 2023-05-10 LAB
AFP-TM SERPL-MCNC: 2.4 NG/ML (ref 0.5–8)
ALBUMIN SERPL BCP-MCNC: 4.1 G/DL (ref 3.5–5)
ALP SERPL-CCNC: 124 U/L (ref 34–104)
ALT SERPL W P-5'-P-CCNC: 8 U/L (ref 7–52)
ANION GAP SERPL CALCULATED.3IONS-SCNC: 9 MMOL/L (ref 4–13)
AST SERPL W P-5'-P-CCNC: 29 U/L (ref 13–39)
BILIRUB SERPL-MCNC: 0.5 MG/DL (ref 0.2–1)
BUN SERPL-MCNC: 5 MG/DL (ref 5–25)
CALCIUM SERPL-MCNC: 9 MG/DL (ref 8.4–10.2)
CHLORIDE SERPL-SCNC: 92 MMOL/L (ref 96–108)
CO2 SERPL-SCNC: 24 MMOL/L (ref 21–32)
CREAT SERPL-MCNC: 0.64 MG/DL (ref 0.6–1.3)
ERYTHROCYTE [DISTWIDTH] IN BLOOD BY AUTOMATED COUNT: 13.3 % (ref 11.6–15.1)
FERRITIN SERPL-MCNC: 24 NG/ML (ref 8–388)
GFR SERPL CREATININE-BSD FRML MDRD: 109 ML/MIN/1.73SQ M
GLUCOSE P FAST SERPL-MCNC: 82 MG/DL (ref 65–99)
HAV AB SER QL IA: NORMAL
HBV SURFACE AB SER-ACNC: <3 MIU/ML
HCT VFR BLD AUTO: 41.5 % (ref 36.5–49.3)
HGB BLD-MCNC: 14.2 G/DL (ref 12–17)
INR PPP: 1.01 (ref 0.84–1.19)
IRON SATN MFR SERPL: 11 % (ref 20–50)
IRON SERPL-MCNC: 55 UG/DL (ref 65–175)
MCH RBC QN AUTO: 32.6 PG (ref 26.8–34.3)
MCHC RBC AUTO-ENTMCNC: 34.2 G/DL (ref 31.4–37.4)
MCV RBC AUTO: 95 FL (ref 82–98)
PLATELET # BLD AUTO: 204 THOUSANDS/UL (ref 149–390)
PMV BLD AUTO: 9.5 FL (ref 8.9–12.7)
POTASSIUM SERPL-SCNC: 4.6 MMOL/L (ref 3.5–5.3)
PROT SERPL-MCNC: 7.9 G/DL (ref 6.4–8.4)
PROTHROMBIN TIME: 13.5 SECONDS (ref 11.6–14.5)
RBC # BLD AUTO: 4.35 MILLION/UL (ref 3.88–5.62)
SODIUM SERPL-SCNC: 125 MMOL/L (ref 135–147)
TIBC SERPL-MCNC: 503 UG/DL (ref 250–450)
WBC # BLD AUTO: 6.11 THOUSAND/UL (ref 4.31–10.16)

## 2023-05-17 ENCOUNTER — TELEPHONE (OUTPATIENT)
Dept: GASTROENTEROLOGY | Facility: CLINIC | Age: 58
End: 2023-05-17

## 2023-05-17 DIAGNOSIS — K86.3 PSEUDOCYST OF PANCREAS: ICD-10-CM

## 2023-05-17 DIAGNOSIS — K70.30 ALCOHOLIC CIRRHOSIS OF LIVER WITHOUT ASCITES (HCC): Primary | ICD-10-CM

## 2023-05-17 NOTE — TELEPHONE ENCOUNTER
Patients GI provider:  Nate Montez    Number to return call: [phone]    Reason for call: Pt calling to speak to someone about his recent blood work results  Above is his call back number       Scheduled procedure/appointment date if applicable: Appt 0/66/88

## 2023-05-17 NOTE — TELEPHONE ENCOUNTER
Spoke with pt, relayed results and recommendations  Pt is agreeable to seeing Dr Jessica Ramirez, he has transportation issues and requested if possible to be scheduled for early morning appt with Dr Jessica Ramirez  All questions and concerns addressed  Provided pt with central scheduling number to schedule MRI in 3 mths  Pt verbalized understanding and is agreeable to recommendations

## 2023-05-19 ENCOUNTER — OFFICE VISIT (OUTPATIENT)
Dept: OBGYN CLINIC | Facility: CLINIC | Age: 58
End: 2023-05-19

## 2023-05-19 VITALS
HEART RATE: 93 BPM | HEIGHT: 64 IN | RESPIRATION RATE: 16 BRPM | DIASTOLIC BLOOD PRESSURE: 94 MMHG | BODY MASS INDEX: 21.68 KG/M2 | SYSTOLIC BLOOD PRESSURE: 148 MMHG | WEIGHT: 127 LBS

## 2023-05-19 DIAGNOSIS — M25.511 ACUTE PAIN OF RIGHT SHOULDER: Primary | ICD-10-CM

## 2023-05-19 DIAGNOSIS — M54.12 RADICULOPATHY, CERVICAL REGION: ICD-10-CM

## 2023-05-19 DIAGNOSIS — T84.216A HARDWARE FAILURE OF ANTERIOR COLUMN OF SPINE (HCC): ICD-10-CM

## 2023-05-19 DIAGNOSIS — Z98.1 S/P CERVICAL SPINAL FUSION: ICD-10-CM

## 2023-05-19 RX ORDER — HYDROXYZINE 50 MG/1
TABLET, FILM COATED ORAL
COMMUNITY
Start: 2023-05-09

## 2023-05-19 NOTE — PROGRESS NOTES
Orthopedic Follow Up Note  Shai Todd (60 y o  male)  : 1965 Encounter Date: 2023  Dr Sally Garcia, , Orthopedic Surgeon    Assessment/Plan:  65 y o  male    1  Acute pain of right shoulder    - Ambulatory Referral to Orthopedic Surgery    Discussion:  Reviewed physical exam and imaging with patient on today's exam  The radiographic findings demonstrate no acute fracture, retained orthopedic hardware  Recommend that the patient begin physical therapy with a focus on range of motion and radicular symptoms  Prescribed Naprosyn 500mg 2x a day  Referred patient to spine surgery, Dr Fabby Chance, for hardware failure  The patient will follow-up, as needed, if symptoms persist following 8 weeks of physical therapy  The patient expresses understanding and is in agreement with today's treatment plan  Return if symptoms worsen or fail to improve  Subjective:   65 y o  male presenting to the office for follow up of right shoulder pain  The patient is right-hand dominant  He states that he fell directly onto his shoulder 3 weeks ago  He was evaluated by his PCP, imagining demonstrated no acute fractures  His PCP sent a referral for PT  The patient states that the PT office is not able to see him until 23, he plans to call another location following today's visit  The patient states that he is unable to move his right arm  He uses his left arm to move his shoulder and elbow  He reports pain in the shoulder joint and anterior shoulder  He also reports neck pain  The patient has upper extremity neuropathy  He requested a referral to spine surgery due to the pain caused from the fusion and hardware in his neck  He reported taking extra strength tylenol for his pain  ROS  Review of Systems   Constitutional: Negative for chills and fever  HENT: Negative for ear pain and sore throat  Eyes: Negative for pain and visual disturbance  Respiratory: Negative for cough and shortness of breath  Cardiovascular: Negative for chest pain and palpitations  Gastrointestinal: Negative for abdominal pain and vomiting  Genitourinary: Negative for dysuria and hematuria  Musculoskeletal: Positive for myalgias, neck pain and neck stiffness  Negative for arthralgias and back pain  Skin: Negative for color change and rash  Neurological: Positive for weakness and numbness  Negative for seizures and syncope  All other systems reviewed and are negative  Past Medical History:   Diagnosis Date   • Alcohol abuse    • Traore esophagus    • Bowel obstruction (HCC)    • Bowel perforation (HCC)    • Cardiac disease    • Continuous chronic alcoholism ([facility] [address] ) 10/5/2017   • COPD (chronic obstructive pulmonary disease) (HCC)    • History of shoulder surgery     Right shoulder   • History of transfusion    • Hx of cervical spine surgery    • Hypertension    • Incisional hernia 10/8/2017   • MI, old    • Mitral regurgitation    • Psychiatric disorder    • Seizures ([facility] [address] )      Past Surgical History:   Procedure Laterality Date   • APPENDECTOMY     • BACK SURGERY     • CHOLECYSTECTOMY     • ESOPHAGOGASTRODUODENOSCOPY N/A 11/28/2016    Procedure: ESOPHAGOGASTRODUODENOSCOPY (EGD); Surgeon: Wayne Daniel MD;  Location: [facility]; Service:    • GALLBLADDER SURGERY     • LAPAROTOMY N/A 10/25/2016    Procedure: LAPAROTOMY EXPLORATORY;  Surgeon: Karen Couch MD;  Location: [facility];  Service:    • MOUTH SURGERY     • PERCUTANEOUS PINNING FEMORAL NECK FRACTURE     • SHOULDER SURGERY Right    • SHOULDER SURGERY     • SMALL INTESTINE SURGERY     • STOMACH SURGERY      bal surgery     Results Reviewed     None          Physical Exam:  Physical Exam  Vitals and nursing note reviewed  Constitutional:       General: He is not in acute distress  Appearance: He is well-developed  HENT:      Head: Normocephalic and atraumatic     Eyes:      Conjunctiva/sclera: Conjunctivae normal    Neck:      Comments: Limited ROM, Pain from failed hardware  Cardiovascular:      Rate and Rhythm: Normal rate and regular rhythm  Heart sounds: No murmur heard  Pulmonary:      Effort: Pulmonary effort is normal  No respiratory distress  Breath sounds: Normal breath sounds  Abdominal:      Palpations: Abdomen is soft  Tenderness: There is no abdominal tenderness  Musculoskeletal:         General: Tenderness present  No swelling  Cervical back: Neck supple  Tenderness present  Skin:     General: Skin is warm and dry  Capillary Refill: Capillary refill takes less than 2 seconds  Neurological:      Mental Status: He is alert  Psychiatric:         Mood and Affect: Mood normal        Right Shoulder Exam     Tenderness   Right shoulder tenderness location: proximal humerus  Range of Motion   Active abduction: abnormal   Passive abduction: abnormal   Extension: abnormal   External rotation: abnormal   Forward flexion: abnormal   Internal rotation 0 degrees: abnormal     Other   Erythema: absent  Sensation: decreased  Pulse: present      Left Shoulder Exam   Left shoulder exam is normal           Imaging:  I personally reviewed these images:    X-Ray of right shoulder taken on 5/9/23 were reviewed and showed      1  Retained orthopedic hardware    2  Right shoulder pain, unspecified chronicity    3   Atrophy of muscle of right shoulder          Procedures:  Procedures      Scribe Attestation    I,:  Bubba Gates am acting as a scribe while in the presence of the attending physician :       I,:  Nury Suazo DO personally performed the services described in this documentation    as scribed in my presence :

## 2023-05-22 ENCOUNTER — TELEPHONE (OUTPATIENT)
Dept: PAIN MEDICINE | Facility: MEDICAL CENTER | Age: 58
End: 2023-05-22

## 2023-05-22 DIAGNOSIS — E53.8 FOLIC ACID DEFICIENCY: ICD-10-CM

## 2023-05-22 DIAGNOSIS — F10.10 ALCOHOL ABUSE: ICD-10-CM

## 2023-05-22 RX ORDER — NAPROXEN 500 MG/1
500 TABLET ORAL 2 TIMES DAILY WITH MEALS
Qty: 30 TABLET | Refills: 0 | Status: SHIPPED | OUTPATIENT
Start: 2023-05-22

## 2023-05-22 NOTE — TELEPHONE ENCOUNTER
Caller: Geremias Núñez     Doctor/Office: not spa     Call regarding : Ortho     Call was transferred to: Ortho

## 2023-05-23 RX ORDER — LANOLIN ALCOHOL/MO/W.PET/CERES
100 CREAM (GRAM) TOPICAL DAILY
Qty: 30 TABLET | Refills: 2 | Status: SHIPPED | OUTPATIENT
Start: 2023-05-23

## 2023-05-23 RX ORDER — FOLIC ACID 1 MG/1
1 TABLET ORAL DAILY
Qty: 30 TABLET | Refills: 2 | Status: SHIPPED | OUTPATIENT
Start: 2023-05-23

## 2023-06-01 ENCOUNTER — PATIENT OUTREACH (OUTPATIENT)
Dept: FAMILY MEDICINE CLINIC | Facility: CLINIC | Age: 58
End: 2023-06-01

## 2023-06-01 NOTE — PROGRESS NOTES
"Outpatient Care Management Note:  Follow up call placed to [facility]  He states he is doing \"OK  \"  He will be starting PT for his shoulder and has an appointment with orthopedic surgery regarding his neck hardware  He missed his recent appointment with nephrology due to transportation  His ride needed surgery, he will call to reschedule same  He will also be seeing hepatology after his recent imaging  [facility] states continues compliance with fluid restrictions and medications  Denies any needs  Placing in CM surveillance at this time  [facility] is aware he may call with michael\y questions or concerns    "

## 2023-06-02 ENCOUNTER — HOSPITAL ENCOUNTER (OUTPATIENT)
Dept: RADIOLOGY | Facility: HOSPITAL | Age: 58
Discharge: HOME | End: 2023-06-02
Attending: ORTHOPAEDIC SURGERY

## 2023-06-02 ENCOUNTER — CONSULT (OUTPATIENT)
Dept: OBGYN CLINIC | Facility: HOSPITAL | Age: 58
End: 2023-06-02
Attending: STUDENT IN AN ORGANIZED HEALTH CARE EDUCATION/TRAINING PROGRAM

## 2023-06-02 VITALS
WEIGHT: 126.98 LBS | SYSTOLIC BLOOD PRESSURE: 147 MMHG | BODY MASS INDEX: 21.68 KG/M2 | DIASTOLIC BLOOD PRESSURE: 95 MMHG | HEIGHT: 64 IN | HEART RATE: 78 BPM

## 2023-06-02 DIAGNOSIS — M54.12 RADICULOPATHY, CERVICAL REGION: ICD-10-CM

## 2023-06-02 DIAGNOSIS — R52 PAIN: Primary | ICD-10-CM

## 2023-06-02 DIAGNOSIS — T84.216A HARDWARE FAILURE OF ANTERIOR COLUMN OF SPINE (HCC): ICD-10-CM

## 2023-06-02 DIAGNOSIS — Z98.1 S/P CERVICAL SPINAL FUSION: ICD-10-CM

## 2023-06-02 DIAGNOSIS — R52 PAIN: ICD-10-CM

## 2023-06-02 DIAGNOSIS — S32.030A CLOSED COMPRESSION FRACTURE OF L3 VERTEBRA, INITIAL ENCOUNTER (HCC): ICD-10-CM

## 2023-06-02 NOTE — PROGRESS NOTES
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 but could move his hands  Per patient, he has had ongoing bilateral upper and bilateral lower extremity neuropathy since accident  R hand dominant  Currently describes ongoing neck pain that worsens throughout the day  He notes pain is about 6/10 upon waking up in the morning and is a 10/10 by the time he goes to bed at night  He feels like he is unable to hold up his neck when he walks  Denies radiation of pain into upper extremities or new onset numbness/tingling  Notes he drops items, however this is unchanged since his accident/injury 7-10 years ago  Also with ongoing balance issues  Also notes recent fall about 1 month ago at onset of worsening mid/low back pain  He also sustained right shoulder injury at that time which is he is being evaluated and treated for by Dr Genoveva Ochoa  He denies any lower extremity pain or new numbness/tingling  Denies lower extremity weakness  Denies fever or chills  Denies fine motor changes such as buttoning of shirt or change in gait  Denies heart or lung disease  Denies diabetes  Smokes about 1 5 ppd  Conservative therapy includes the following:  Naproxen without relief  Denies recent steroid injections  Denies recent formal physical therapy for his neck or back  Is currently in physical therapy for his right shoulder  Previous was in aqua therapy a few years ago with some improvement  These therapeutic modalities were ineffective  The patient has noticed significant impairment in performing the following ADLs: Dakota Faustin has been on disability since accident 7-10 years ago  He is able to care for himself at home        FAMILY HISTORY   Family History   Problem Relation Age of Onset   • Breast cancer Mother    • Prostate cancer Father    • Skin cancer Brother        PAST MEDICAL HISTORY:   Past Medical History:   Diagnosis Date   • Alcohol abuse    • Traore esophagus    • Bowel obstruction (HCC)    • Bowel perforation ([facility] [address] )    • Cardiac disease    • Continuous chronic alcoholism ([address] ) 10/5/2017   • COPD (chronic obstructive pulmonary disease) (HCC)    • History of shoulder surgery     Right shoulder   • History of transfusion    • Hx of cervical spine surgery    • Hypertension    • Incisional hernia 10/8/2017   • MI, old    • Mitral regurgitation    • Psychiatric disorder    • Seizures (HCC)        MEDICATIONS:  Current Outpatient Medications   Medication Sig Dispense Refill   • albuterol (Ventolin HFA) 90 mcg/act inhaler Inhale 2 puffs every 6 (six) hours as needed for wheezing 8 g 1   • Cholecalciferol 25 MCG (1000 UT) tablet Take 1 tablet (1,000 Units total) by mouth daily 30 tablet 2   • cyanocobalamin (VITAMIN B-12) 100 mcg tablet Take 1 tablet (100 mcg total) by mouth daily 30 tablet 5   • Diclofenac Sodium (VOLTAREN) 1 % Apply 2 g topically 4 (four) times a day 100 g 1   • docusate sodium (COLACE) 100 mg capsule Take 1 capsule (100 mg total) by mouth 2 (two) times a day as needed for constipation 30 capsule 0   • ergocalciferol (VITAMIN D2) 50,000 units Take 1 capsule (50,000 Units total) by mouth once a week 13 capsule 1   • folic acid (FOLVITE) 1 mg tablet Take 1 tablet (1 mg total) by mouth daily 30 tablet 2   • hydrOXYzine HCL (ATARAX) 50 mg tablet      • levETIRAcetam (KEPPRA) 1000 MG tablet Take 1 tablet (1,000 mg total) by mouth every 12 (twelve) hours 60 tablet 2   • lisinopril (ZESTRIL) 2 5 mg tablet Take 1 tablet (2 5 mg total) by mouth daily 90 tablet 2   • LORazepam (ATIVAN) 1 mg tablet Take 1 tablet (1 mg total) by mouth daily at bedtime 30 tablet 0   • midodrine (PROAMATINE) 5 mg tablet Take 1 tablet (5 mg total) by mouth 3 (three) times a day before meals 90 tablet 0   • Multiple Vitamin (TAB-A-BONI PO) Take 1 tablet by mouth daily     • multivitamin (THERAGRAN) TABS Take 1 tablet by mouth daily     • naproxen (Naprosyn) 500 mg tablet Take 1 tablet (500 mg total) by mouth 2 (two) times a day with meals 30 tablet 0   • omeprazole (PriLOSEC) 20 mg delayed release capsule Take 1 capsule (20 mg total) by mouth daily 30 capsule 11   • sodium chloride 1 g tablet Take 2 tablets (2 g total) by mouth 3 (three) times a day with meals 240 tablet 3   • thiamine 100 MG tablet Take 1 tablet (100 mg total) by mouth daily 30 tablet 2     No current facility-administered medications for this visit  PAST SURGICAL HISTORY:  Past Surgical History:   Procedure Laterality Date   • APPENDECTOMY     • BACK SURGERY     • CHOLECYSTECTOMY     • ESOPHAGOGASTRODUODENOSCOPY N/A 11/28/2016    Procedure: ESOPHAGOGASTRODUODENOSCOPY (EGD); Surgeon: Guy Martinez MD;  [facility]; Service:    • GALLBLADDER SURGERY     • LAPAROTOMY N/A 10/25/2016    Procedure: LAPAROTOMY EXPLORATORY;  Surgeon: Son Sears MD;  Location: [facility];  Service:    • MOUTH SURGERY     • PERCUTANEOUS PINNING FEMORAL NECK FRACTURE     • SHOULDER SURGERY Right    • SHOULDER SURGERY     • SMALL INTESTINE SURGERY     • STOMACH SURGERY      bal surgery       SOCIAL HISTORY:  Social History     Socioeconomic History   • Marital status: Single     Spouse name: Not on file   • Number of children: Not on file   • Years of education: Not on file   • Highest education level: Not on file   Occupational History   • Not on file   Tobacco Use   • Smoking status: Every Day     Packs/day: 3 00     Years: 35 00     Total pack years: 105 00     Types: Cigarettes   • Smokeless tobacco: Never   Vaping Use   • Vaping Use: Former   Substance and Sexual Activity   • Alcohol use:  Yes     Alcohol/week: 42 0 standard drinks of alcohol     Types: 42 Cans of beer per week     Comment: eliceo "six pack a day   • Drug use: No   • Sexual activity: Not Currently     Partners: Female   Other Topics Concern   • Not on file   Social History Narrative   • Not on file     Social Determinants of Health     Financial Resource Strain: Not on file   Food Insecurity: No Food Insecurity (2/27/2023)    Hunger Vital Sign    • Worried About Running Out of Food in the Last Year: Never true    • Ran Out of Food in the Last Year: Never true   Transportation Needs: No Transportation Needs (2/27/2023)    PRAPARE - Transportation    • Lack of Transportation (Medical): No    • Lack of Transportation (Non-Medical): No   Physical Activity: Not on file   Stress: Not on file   Social Connections: Not on file   Intimate Partner Violence: Not on file   Housing Stability: Low Risk  (2/27/2023)    Housing Stability Vital Sign    • Unable to Pay for Housing in the Last Year: No    • Number of Places Lived in the Last Year: 1    • Unstable Housing in the Last Year: No     ALLERGIES:  No Known Allergies    ROS:   Constitutional:  No fever, chills, night sweats, loss of appetite   HEENT No no sore throat, difficulty swallowing   Cardiovascular:  No chest pain, palpitations, BLE edema, ALVARADO   Respiratory:  No SOB, coughing, chest congestion   Gastrointestinal:  No nausea, vomiting, abdominal pain   Genitourinary:  No dysuria, hematuria, urinary urgency/frequency   Musculoskeletal:  See HPI   Skin:  No rash, erythema, edema   Neurologic:  See HPI   Psychiatric Illness:  No depression, anxiety, mood disorder, substance abuse disorder     PHYSICAL EXAM:  /95   Pulse 78   Ht 5' 4\" (1 626 m)   Wt 57 6 kg (126 lb 15 8 oz)   BMI 21 80 kg/m²        General:  Well-developed,appears well, no acute distress   Respiratory:  No SOB, no abnormal effort (use of accessory muscles)  GI / Abdominal:  Soft  No abdominal masses or tenderness  Nondistended  Gait & balance No evidence of myelopathic gait       Cervical  spine range of " motion:  Limited neck ROM    +TTP throughout paraspinal musculature       Neurologic:  Upper Extremity Motor Function    Right  Left    Deltoid  Unable to test 5/5    Bicep  4/5 5/5    Wrist extension  5/5 5/5    Tricep  4/5 5/5    Finger flexion/  5/5 5/5    Hand intrinsic  5/5 5/5      Lower Extremity Motor Function    Right  Left    Iliopsoas  5/5 5/5    Quadriceps 5/5 5/5   Tibialis anterior  5/5 5/5    EHL  5/5 5/5    Gastroc  muscle  5/5 5/5    Heel rise  5/5 5/5    Toe rise  5/5 5/5      Sensory: light touch is intact to bilateral upper and lower extremities     Reflexes:    Right Left   Biceps 2+ 2+   Triceps 2+ 2+   Brachioradialis 2+ 2+   Patellar 1+ 1+   Achilles 1+ 1+   Babinski neg neg     Other tests:  Spurling's: negative  Turner's: negative  Inverted radial reflex: negative  Clonus: negative  Shoulder: painless active & passive ROM left; decreased shoulder active & passive ROM; pain with palpation  Tandem gait: positive    IMAGING Review: I have personally reviewed the images and these are my findings:  Cervical spine xray from 6/2/2023: C4-T1 bilateral lateral mass screw and becka construct with hardware failure, also with C5-7 ACDF construct, cervical thoracic kyphosis      Lumbar spine xray from 5/9/2023 and 6/2/2023: Multilevel lumbar spondylosis, mild facet arthropathy, no apparent spondylolisthesis, no obvious instability on flexion-extension radiographs, L3 compression deformity      Scoliosis series xray from 6/2/2023: Severe cervical thoracic kyphosis, varying levels of chronic appearing thoracic compression fractures, +SVA      ASSESSMENT / Medical Decision Making (MDM):  65year old male with history of neck and back pain without radicular symptoms  PMH cervical fusion surgery  No incontinence of bowel or bladder, no fever, no chills, night sweats  Physical exam showing right upper extremity weakness     Imaging reviewed as above    Findings most notable for cervicothoracic kyphosis, cervical fusion construct with hardware failure     Impression of Dropped Head Syndrome     Plan: The above clinical, physical and imaging findings were reviewed with the patient  Miguelito Alves  has a constellation of findings consistent with Dropped Head Syndrome in the setting of cervical thoracic kyphosis, previous cervical fusion with hardware failure, multiple thoracic compression fractures  Fortunately Miguelito Alves remains at his neurologic baseline and functional   He does have difficulty maintaining a horizontal gaze due to his posture  We did discuss with Miguelito Alves the role of surgical intervention, which he will need to be referred to an outside institution for our complex spinal reconstruction surgery  Considering he is a daily tobacco user and remains at his neurologic baseline, he is not a surgical candidate at this time  I did discuss with Miguelito Alves that surgical intervention would likely require revision cervical fusion with extension into his thoracic spine with 3 column osteotomy with high complication profile  MRI cervical spine to further evaluate patient's symptoms  Will review MRI in detail with patient at follow-up visit and discuss further treatment options at that time  Patient fitted for LSO brace at today's visit  Patient instructed to wear the brace when sitting (>45 degrees), standing, and walking  Patient should not have brace on when driving or sleeping  Discussed purpose of brace, proper usage, and brace care  Limit bending, twisting, heavy lifting over the next few weeks to facilitate healing of compression fracture  Miguelito Alves required immediate fitting of brace at today's visit due to continued pain in setting acute L3 vertebral fracture  Continue with medications as previously prescribed if providing pain/symptom relief  Patient instructed to return to office/ER sooner if symptoms are not improving, getting worse, or new worrisome/neurologic symptoms arise

## 2023-06-04 ENCOUNTER — HOSPITAL ENCOUNTER (OUTPATIENT)
Dept: MRI IMAGING | Facility: HOSPITAL | Age: 58
Discharge: HOME | End: 2023-06-04
Payer: COMMERCIAL

## 2023-06-04 DIAGNOSIS — M54.12 RADICULOPATHY, CERVICAL REGION: ICD-10-CM

## 2023-06-04 DIAGNOSIS — Z98.1 S/P CERVICAL SPINAL FUSION: ICD-10-CM

## 2023-06-04 PROCEDURE — G1004 CDSM NDSC: HCPCS

## 2023-06-04 PROCEDURE — 72141 MRI NECK SPINE W/O DYE: CPT

## 2023-06-06 ENCOUNTER — EVALUATION (OUTPATIENT)
Dept: PHYSICAL THERAPY | Facility: CLINIC | Age: 58
End: 2023-06-06
Payer: COMMERCIAL

## 2023-06-06 DIAGNOSIS — M25.511 ACUTE PAIN OF RIGHT SHOULDER: Primary | ICD-10-CM

## 2023-06-06 PROCEDURE — 97163 PT EVAL HIGH COMPLEX 45 MIN: CPT | Performed by: PHYSICAL THERAPIST

## 2023-06-06 PROCEDURE — 97140 MANUAL THERAPY 1/> REGIONS: CPT | Performed by: PHYSICAL THERAPIST

## 2023-06-06 PROCEDURE — 97112 NEUROMUSCULAR REEDUCATION: CPT | Performed by: PHYSICAL THERAPIST

## 2023-06-06 NOTE — LETTER
2023    Shailesh Kincaid DO  [address] [facility]    Patient: Kevin Noe   YOB: 1965   Date of Visit: 2023     Encounter Diagnosis     ICD-10-CM    1  Acute pain of right shoulder  M25 511           Dear Dr Wale Medrano:    Thank you for your recent referral of Kevin Noe  Please review the attached evaluation summary from Cristofer's recent visit  Please verify that you agree with the plan of care by signing the attached order  If you have any questions or concerns, please do not hesitate to call  I sincerely appreciate the opportunity to share in the care of one of your patients and hope to have another opportunity to work with you in the near future  Sincerely,    AMGas Mitra, PT      Referring Provider:      I certify that I have read the below Plan of Care and certify the need for these services furnished under this plan of treatment while under my care  Shailesh Kincaid DO  [address] [place] [address]  [place] [place]          PT Evaluation     Today's date: 2023  Patient name: Kevin Noe  : 1965  MRN: [id number]  Referring provider: Erik Dumont DO  Dx:   Encounter Diagnosis     ICD-10-CM    1  Acute pain of right shoulder  M25 511           Start Time: 0800  Stop Time: 0900  Total time in clinic (min): 60 minutes    Assessment  Assessment details: Patient is a 66 y/o male with chief c/o R shoulder pain  There is noted atrophy to the R UE upon palpation and visual inspection  There is also a forward shoulder position noted at rest with R>L  He does exhibit signs of apprehension at end range of flexion and ER with muscle guarding noted  Decreased strength noted with all movements of the R shoulder today during resistance testing  He has a functional loss of rom and strength to the R UE  There was fair tolerance for initial PROM and isometric strengthening with limitation secondary to increased pain   Instructed patient on "performance of exercises for HEP today  Impairments: abnormal or restricted ROM, abnormal movement, activity intolerance, impaired physical strength, lacks appropriate home exercise program, pain with function and scapular dyskinesis    Symptom irritability: moderateUnderstanding of Dx/Px/POC: good   Prognosis: fair    Goals  STGs:  \"Patient will be independent with hep by 2-3 visits  -   Improve shoulder flexion to 140 degrees for improved tolerance with adls and home duties by 3-4 weeks  Decrease pain levels by 50% for improved tolerance with adls and home duties by 3-4 weeks  \"    LTGs:  \"Improve FOTO score from 40 To 58 Indicating improved tolerance with activities involving the UE by discharge  Patient will demonstrate full, pain free strength and rom of the shoulder for improved tolerance with adls and home duties by discharge  Patient will be able to return to normal home duties by discharge  Sleep will be undisturbed from the shoulder by discharge  \"      Plan  Plan details: Patient informed that from this point forward, to ensure adherence to the aforementioned plan of care, all or some of the treatment may be performed and carried out by a Physical Therapy Assistant (PTA) with supervision from a licensed Physical Therapist (PT) in accordance with [place] [address]  Patient will continue to benefit from skilled physical therapy to address the functional deficits that were identified during the evaluation today  We will continue to progress the therapy program to address these functional deficits and achieve the established goals            Patient would benefit from: skilled physical therapy  Planned modality interventions: cryotherapy  Planned therapy interventions: ADL retraining, body mechanics training, functional ROM exercises, flexibility, home exercise program, therapeutic exercise, therapeutic activities, stretching, strengthening, patient education, postural " training, manual therapy and joint mobilization  Frequency: 2x week  Duration in weeks: 6  Plan of Care beginning date: 2023  Plan of Care expiration date: 2023  Treatment plan discussed with: patient        Subjective Evaluation    History of Present Illness  Mechanism of injury: Patient presents to out patient physical therapy with chief c/o R shoulder pain  Patient reports a long standing history of R should issues stemming from approximately 10 years ago when he fell over his ex-wife's cat and fractured his shoulder  He had an ORIF perform and was doing well until approximately 1 year ago when he fell down a flight of stairs and dislocated his shoulder  He was previously treated with PT and was doing well until around the end of April when he fell again injuring his shoulder  He had x-rays which were negative for fracture but reports limited use of the R UE  Recurrent probem    Quality of life: good    Pain  Current pain ratin  At best pain ratin  At worst pain rating: 10  Location: R shoulder  Quality: discomfort, sharp and tight  Relieving factors: rest, change in position and medications  Aggravating factors: overhead activity and lifting (reaching behind)    Social Support  Lives with: Older brother  Employment status: not working  Hand dominance: right    Treatments  Current treatment: physical therapy  Patient Goals  Patient goals for therapy: decreased pain, increased motion, increased strength and independence with ADLs/IADLs  Patient goal: Patient wishes to regain functional mobility of the R shoulder to return to his normal daily activities  Objective     Static Posture     Head  Forward  Shoulders  Rounded  Thoracic Spine  Hyperkyphosis  Tenderness     Additional Tenderness Details  Patient reports tenderness to palpation of the anterior aspect of the R shoulder   There is noted atrophy to the R UE musculature most notable with the R triceps when compared to the L  His humerus is also sitting slightly anterior in a resting position which may be due to recent dislocation and anterior joint capsule laxity  Will continue to monitor for any changes with this over the next few session  Also there is no noted trophic changes to the R distal arm noting any vascular compromise  Active Range of Motion   Left Shoulder   Flexion: 132 degrees   Extension: 62 degrees   Abduction: 113 degrees     Right Shoulder   Flexion: 36 degrees   Extension: 14 degrees   Abduction: 36 degrees     Passive Range of Motion   Left Shoulder   Flexion: 150 degrees   External rotation 90°: 82 degrees   Internal rotation 90°: 67 degrees     Right Shoulder   Flexion: 96 degrees   External rotation 45°: 62 degrees   Internal rotation 45°: 21 degrees     Strength/Myotome Testing     Left Shoulder   Normal muscle strength    Right Shoulder     Planes of Motion   Flexion: 3-   Extension: 3-   Abduction: 3-   Adduction: 3-   External rotation at 0°: 3-   Internal rotation at 0°: 3-       Flowsheet Rows    Flowsheet Row Most Recent Value   PT/OT G-Codes    Current Score 40   Projected Score 58       Access Code: [email or web address]  URL: [email or web address]  Date: 06/06/2023  Prepared by: Fátima Adair    Exercises  - Isometric Shoulder Flexion at Wall  - 2 x daily - 7 x weekly - 2 sets - 10 reps - 5 sec hold  - Isometric Shoulder Extension at Wall  - 2 x daily - 7 x weekly - 2 sets - 10 reps - 5 sec hold  - Isometric Shoulder Abduction at Wall  - 2 x daily - 7 x weekly - 2 sets - 10 reps - 5 sec hold  - Isometric Shoulder Adduction  - 2 x daily - 7 x weekly - 2 sets - 10 reps - 5 sec hold  - Seated Isometric Elbow Flexion  - 2 x daily - 7 x weekly - 2 sets - 10 reps - 5 sec hold  - Seated Isometric Elbow Extension  - 2 x daily - 7 x weekly - 2 sets - 10 reps - 5 sec hold    Precautions: Hx of chronic falls, Hx of ORIF to the R proximal humerus x 7-10 years ago, Hx of seizures, Hx of shoulder "dislocation    Date 6/6 IE       FOTO 40 SC       Manuals        Passive shoulder rom 10 min                               Neuro Re-Ed        Shoulder isometrics flex, ext, abd, add 5\" 5x ea       Elbow isometric flex, ext 5\" 5x       Shrugs        Scapular retractions                                Ther Ex        Supine wand press 10x       Table slides        Pulleys        Wall ladder                                        Ther Activity                        Gait Training                        Modalities        CP/HP PRN                                        "

## 2023-06-06 NOTE — PROGRESS NOTES
"PT Evaluation     Today's date: 2023  Patient name: Chris Camacho  : 1965  MRN: [id number]  Referring provider: Luisa Yates DO  Dx:   Encounter Diagnosis     ICD-10-CM    1  Acute pain of right shoulder  M25 511           Start Time: 0800  Stop Time: 0900  Total time in clinic (min): 60 minutes    Assessment  Assessment details: Patient is a 64 y/o male with chief c/o R shoulder pain  There is noted atrophy to the R UE upon palpation and visual inspection  There is also a forward shoulder position noted at rest with R>L  He does exhibit signs of apprehension at end range of flexion and ER with muscle guarding noted  Decreased strength noted with all movements of the R shoulder today during resistance testing  He has a functional loss of rom and strength to the R UE  There was fair tolerance for initial PROM and isometric strengthening with limitation secondary to increased pain  Instructed patient on performance of exercises for HEP today  Impairments: abnormal or restricted ROM, abnormal movement, activity intolerance, impaired physical strength, lacks appropriate home exercise program, pain with function and scapular dyskinesis    Symptom irritability: moderateUnderstanding of Dx/Px/POC: good   Prognosis: fair    Goals  STGs:  \"Patient will be independent with hep by 2-3 visits  -   Improve shoulder flexion to 140 degrees for improved tolerance with adls and home duties by 3-4 weeks  Decrease pain levels by 50% for improved tolerance with adls and home duties by 3-4 weeks  \"    LTGs:  \"Improve FOTO score from 40 To 58 Indicating improved tolerance with activities involving the UE by discharge  Patient will demonstrate full, pain free strength and rom of the shoulder for improved tolerance with adls and home duties by discharge  Patient will be able to return to normal home duties by discharge  Sleep will be undisturbed from the shoulder by discharge   \"      Plan  Plan details: Patient informed " that from this point forward, to ensure adherence to the aforementioned plan of care, all or some of the treatment may be performed and carried out by a Physical Therapy Assistant (PTA) with supervision from a licensed Physical Therapist (PT) in accordance with [address] [address]  Patient will continue to benefit from skilled physical therapy to address the functional deficits that were identified during the evaluation today  We will continue to progress the therapy program to address these functional deficits and achieve the established goals  Patient would benefit from: skilled physical therapy  Planned modality interventions: cryotherapy  Planned therapy interventions: ADL retraining, body mechanics training, functional ROM exercises, flexibility, home exercise program, therapeutic exercise, therapeutic activities, stretching, strengthening, patient education, postural training, manual therapy and joint mobilization  Frequency: 2x week  Duration in weeks: 6  Plan of Care beginning date: 2023  Plan of Care expiration date: 2023  Treatment plan discussed with: patient        Subjective Evaluation    History of Present Illness  Mechanism of injury: Patient presents to out patient physical therapy with chief c/o R shoulder pain  Patient reports a long standing history of R should issues stemming from approximately 10 years ago when he fell over his ex-wife's cat and fractured his shoulder  He had an ORIF perform and was doing well until approximately 1 year ago when he fell down a flight of stairs and dislocated his shoulder  He was previously treated with PT and was doing well until around the end of April when he fell again injuring his shoulder  He had x-rays which were negative for fracture but reports limited use of the R UE             Recurrent probem    Quality of life: good    Pain  Current pain ratin  At best pain ratin  At worst pain rating: 10  Location: R shoulder  Quality: discomfort, sharp and tight  Relieving factors: rest, change in position and medications  Aggravating factors: overhead activity and lifting (reaching behind)    Social Support  Lives with: Older brother  Employment status: not working  Hand dominance: right    Treatments  Current treatment: physical therapy  Patient Goals  Patient goals for therapy: decreased pain, increased motion, increased strength and independence with ADLs/IADLs  Patient goal: Patient wishes to regain functional mobility of the R shoulder to return to his normal daily activities  Objective     Static Posture     Head  Forward  Shoulders  Rounded  Thoracic Spine  Hyperkyphosis  Tenderness     Additional Tenderness Details  Patient reports tenderness to palpation of the anterior aspect of the R shoulder  There is noted atrophy to the R UE musculature most notable with the R triceps when compared to the L  His humerus is also sitting slightly anterior in a resting position which may be due to recent dislocation and anterior joint capsule laxity  Will continue to monitor for any changes with this over the next few session  Also there is no noted trophic changes to the R distal arm noting any vascular compromise       Active Range of Motion   Left Shoulder   Flexion: 132 degrees   Extension: 62 degrees   Abduction: 113 degrees     Right Shoulder   Flexion: 36 degrees   Extension: 14 degrees   Abduction: 36 degrees     Passive Range of Motion   Left Shoulder   Flexion: 150 degrees   External rotation 90°: 82 degrees   Internal rotation 90°: 67 degrees     Right Shoulder   Flexion: 96 degrees   External rotation 45°: 62 degrees   Internal rotation 45°: 21 degrees     Strength/Myotome Testing     Left Shoulder   Normal muscle strength    Right Shoulder     Planes of Motion   Flexion: 3-   Extension: 3-   Abduction: 3-   Adduction: 3-   External rotation at 0°: 3-   Internal rotation at 0°: 3- "      Flowsheet Rows    Flowsheet Row Most Recent Value   PT/OT G-Codes    Current Score 40   Projected Score 58       Access Code: [email or web address]  URL: [email or web address]  Date: 06/06/2023  Prepared by: Emili Hamlin    Exercises  - Isometric Shoulder Flexion at Wall  - 2 x daily - 7 x weekly - 2 sets - 10 reps - 5 sec hold  - Isometric Shoulder Extension at Wall  - 2 x daily - 7 x weekly - 2 sets - 10 reps - 5 sec hold  - Isometric Shoulder Abduction at Wall  - 2 x daily - 7 x weekly - 2 sets - 10 reps - 5 sec hold  - Isometric Shoulder Adduction  - 2 x daily - 7 x weekly - 2 sets - 10 reps - 5 sec hold  - Seated Isometric Elbow Flexion  - 2 x daily - 7 x weekly - 2 sets - 10 reps - 5 sec hold  - Seated Isometric Elbow Extension  - 2 x daily - 7 x weekly - 2 sets - 10 reps - 5 sec hold     Precautions: Hx of chronic falls, Hx of ORIF to the R proximal humerus x 7-10 years ago, Hx of seizures, Hx of shoulder dislocation    Date 6/6 IE       FOTO 40 SC       Manuals        Passive shoulder rom 10 min                               Neuro Re-Ed        Shoulder isometrics flex, ext, abd, add 5\" 5x ea       Elbow isometric flex, ext 5\" 5x       Shrugs        Scapular retractions                                Ther Ex        Supine wand press 10x       Table slides        Pulleys        Wall ladder                                        Ther Activity                        Gait Training                        Modalities        CP/HP PRN                       "

## 2023-06-07 ENCOUNTER — TELEPHONE (OUTPATIENT)
Dept: OBGYN CLINIC | Facility: HOSPITAL | Age: 58
End: 2023-06-07

## 2023-06-09 ENCOUNTER — TELEPHONE (OUTPATIENT)
Dept: GASTROENTEROLOGY | Facility: MEDICAL CENTER | Age: 58
End: 2023-06-09

## 2023-06-09 ENCOUNTER — OFFICE VISIT (OUTPATIENT)
Dept: PHYSICAL THERAPY | Facility: CLINIC | Age: 58
End: 2023-06-09
Payer: COMMERCIAL

## 2023-06-09 DIAGNOSIS — M25.511 ACUTE PAIN OF RIGHT SHOULDER: Primary | ICD-10-CM

## 2023-06-09 DIAGNOSIS — G47.00 INSOMNIA, UNSPECIFIED TYPE: ICD-10-CM

## 2023-06-09 DIAGNOSIS — G40.909 SEIZURE DISORDER (HCC): ICD-10-CM

## 2023-06-09 PROCEDURE — 97140 MANUAL THERAPY 1/> REGIONS: CPT

## 2023-06-09 PROCEDURE — 97112 NEUROMUSCULAR REEDUCATION: CPT

## 2023-06-09 PROCEDURE — 97110 THERAPEUTIC EXERCISES: CPT

## 2023-06-09 RX ORDER — LORAZEPAM 1 MG/1
1 TABLET ORAL
Qty: 30 TABLET | Refills: 0 | Status: CANCELLED | OUTPATIENT
Start: 2023-06-09

## 2023-06-09 RX ORDER — LEVETIRACETAM 1000 MG/1
1000 TABLET ORAL EVERY 12 HOURS
Qty: 60 TABLET | Refills: 2 | Status: CANCELLED | OUTPATIENT
Start: 2023-06-09

## 2023-06-09 NOTE — TELEPHONE ENCOUNTER
Pt returned call  Unfortunately he cannot come on 06/12/2023 but will see Dr Dino Hayden on 06/15/2023

## 2023-06-09 NOTE — PROGRESS NOTES
"Daily Note     Today's date: 2023  Patient name: Scar Herman  : 1965  MRN: [id number]  Referring provider: Abhay Floyd DO  Dx:   Encounter Diagnosis     ICD-10-CM    1  Acute pain of right shoulder  M25 511           Start Time: 0800  Stop Time: 0845  Total time in clinic (min): 45 minutes    Subjective: Pt notes min change in his R shoulder sx  Objective: See treatment diary below      Assessment: Tolerated treatment well  Patient would benefit from continued PT      Plan: Continue per plan of care        Precautions: Hx of chronic falls, Hx of ORIF to the R proximal humerus x 7-10 years ago, Hx of seizures, Hx of shoulder dislocation    Date  IE       FOTO 40 SC       Manuals        Passive shoulder rom 10 min ds              Neuro Re-Ed        Shoulder isometrics flex, ext, abd, add 5\" 5x ea 5x 5\" ea      Elbow isometric flex, ext 5\" 5x 5x 5\"      Shrugs  15x      Scapular retractions  15x                      Ther Ex        Supine wand press 10x 10x      Table slides  15x F      Pulleys  20x 5\"      Wall ladder  5x                               Ther Activity                        Gait Training                Modalities        CP/HP PRN  Hp 10m                       "

## 2023-06-09 NOTE — TELEPHONE ENCOUNTER
----- Message from Teresa Denver, MD sent at 6/9/2023  8:18 AM EDT -----  Hello  Does he need to be seen still for his pancreatic cyst - can we offer Monday? Melva June    ----- Message -----  From: Reynaldo Willson  Sent: 5/24/2023   9:39 AM EDT  To: Orlando Hamilton MD; Teresa Denver, MD; #    Hello,      Please advise  Thanks,    ----- Message -----  From: Manuelitoanuel Kaurau  Sent: 5/24/2023   8:36 AM EDT  To: #    Patient needs office visit  Thanks    ----- Message -----  From: Sigifredo Monahan  Sent: 5/24/2023   8:34 AM EDT  To: [facility]      ----- Message -----  From: Barbara Alexander PA-C  Sent: 5/24/2023   8:22 AM EDT  To: [facility]    Pt needs to have appt with advanced endoscopy physician regarding his pancreatic cyst    ----- Message -----  From: Teresa Denver, MD  Sent: 5/23/2023  10:02 AM EDT  To: JJ Morin  Thanks for reaching out  If it's asymptomatic then we can observe it  However, it would be best for one of the advanced physicians to see him in office and discuss this with him  Thank you  Melva June    ----- Message -----  From: Barbara Alexander PA-C  Sent: 5/17/2023  12:13 PM EDT  To: Teresa Denver, MD    HI Dr Rut Stanton,     I have this 66 y/o M Pw/ pmhx sig for for EtOH cirrhosis, Traore's esophagus, history of peptic ulcer disease, chronic pancreatitis with pseudocyst, COPD, HTN, seizure disorder, tobacco use disorder  I saw him up in [place] for follow-up  He is asymptomatic at this time, no post-prandial pain, poor appetite, weight loss, no abd pain or steatorrhea  I was surprised given the size  I was reading about pseudocysts, even large ones, and if asymptomatic do you think okay to continue to monitor?  I wasn't sure if this new prominence of the PD is something that I should investigate further or if he should have repeat EUS?     02/2023: CT Chest:   Increased size of the partially visualized 6 9 cm pancreatic head pseudocyst since 5/23/2022 and new mild dilatation of the main pancreatic duct, likely due to mass effect from the pancreatic head pseudocyst  Mildly patulous esophagus with air-fluid levels  02/2023: CT C/A/P: Large pancreatic head pseudocyst measuring 6 4 x 6 5 cm, stable allowing for differences in measurement technique   Some peripheral calcifications noted as before   Continued prominence of the main pancreatic duct may reflect partial obstruction from the pseudocyst itself     Endoscopic history:  EGD: 07/2020: Irregular Z line with findings consistent with short segment Traore's esophagus; normal stomach and duodenum  EGD/EUS: 09/2020: Parenchymal changes in the pancreas suggestive but not diagnostic for chronic pancreatitis  No cysts identified so these likely were acute inflammatory pancreatic collections which have resolved    Thanks in advance for your response and expertise!

## 2023-06-09 NOTE — TELEPHONE ENCOUNTER
Left message for patient to call office in regards of opening with Dr Adelaida So for 06/12/2023  Asked patient to call office asap to schedule due to limited availability

## 2023-06-13 ENCOUNTER — OFFICE VISIT (OUTPATIENT)
Dept: PHYSICAL THERAPY | Facility: CLINIC | Age: 58
End: 2023-06-13
Payer: COMMERCIAL

## 2023-06-13 DIAGNOSIS — M25.511 ACUTE PAIN OF RIGHT SHOULDER: Primary | ICD-10-CM

## 2023-06-13 PROCEDURE — 97140 MANUAL THERAPY 1/> REGIONS: CPT

## 2023-06-13 PROCEDURE — 97112 NEUROMUSCULAR REEDUCATION: CPT

## 2023-06-13 PROCEDURE — 97110 THERAPEUTIC EXERCISES: CPT

## 2023-06-13 NOTE — PROGRESS NOTES
"Daily Note     Today's date: 2023  Patient name: Harry Gillespie  : 1965  MRN: [id number]  Referring provider: Messi Perry DO  Dx:   Encounter Diagnosis     ICD-10-CM    1  Acute pain of right shoulder  M25 511           Start Time: 0800  Stop Time: 0900  Total time in clinic (min): 60 minutes    Subjective: Pt notes slight increased guillermo to home ADL's  Objective: See treatment diary below      Assessment: Tolerated treatment well  Patient exhibited good technique with therapeutic exercises      Plan: Continue per plan of care        Precautions: Hx of chronic falls, Hx of ORIF to the R proximal humerus x 7-10 years ago, Hx of seizures, Hx of shoulder dislocation    Date  IE      FOTO 40 SC       Manuals        Passive shoulder rom 10 min ds ds             Neuro Re-Ed        Shoulder isometrics flex, ext, abd, add 5\" 5x ea 5x 5\" ea 10x 5\"     Elbow isometric flex, ext 5\" 5x 5x 5\" 10x 5\"     Shrugs  15x 20x     Scapular retractions  15x 20x                     Ther Ex        Supine wand press 10x 10x 15x      Table slides  15x F 15x F     Pulleys  20x 5\" 20x 5\"     Wall ladder  5x  5x                              Ther Activity                        Gait Training                Modalities        CP/HP PRN  Hp 10m 10m                        "

## 2023-06-14 ENCOUNTER — TELEPHONE (OUTPATIENT)
Dept: GASTROENTEROLOGY | Facility: MEDICAL CENTER | Age: 58
End: 2023-06-14

## 2023-06-14 NOTE — TELEPHONE ENCOUNTER
MD Leanna Skaggs; Antonio Brown; Simone Martinez MD; Verona Estevez MD; Gabriel Boogie [facility]; 2 others  [address] [address]   He can follow up with Dr Sabiha Floyd and he can inform us  Thank you   Caren Perez

## 2023-06-14 NOTE — TELEPHONE ENCOUNTER
Kwadwo Quijano MD; Thiago Brooks; Kanwal Owens MD; Valentin Shah MD; [facility]; 1 other  Patient states he can't make it today  He has an appointment with Dr Silverio Lara in [place] tomorrow  States he can't talk now and if anyone needs to reach him further to leave a voicemail   But as for today there is no way and it's too far  Thank you            Previous Messages       ----- Message -----   From: Annette Saenz MD   Sent: 6/14/2023   8:42 AM EDT   To: Valentin Shah MD; aKnwal Owens MD; *     I can see today if he can make it at 2pm or 10am on 7/7  Thanks  Linda Villa     ----- Message -----   From: Azael Paniagua   Sent: 6/14/2023   7:27 AM EDT   To: Valentin Shah MD; Annette Saenz MD; *     Please advise clerical as to what date and time we can add patient as there are no openings  Thank you    ----- Message -----   From: Annette Saenz MD   Sent: 6/13/2023   8:13 AM EDT   To: Valentin Shah MD; Kanwal Owens MD; *     Cat  Did he get an office visit? If not yet then can we schedule him in the next week or so with one of us  Thank you  Linda Villa     ----- Message -----   From: Thiago Brooks   Sent: 5/24/2023   9:39 AM EDT   To: Valentin Shah MD; Annette Saenz MD; *     Cat,       Please advise  Thanks,     ----- Message -----   From: Saurav Flood   Sent: 5/24/2023   8:36 AM EDT   To: *     Patient needs office visit  Thanks     ----- Message -----   From: Brenda So   Sent: 5/24/2023   8:34 AM EDT   To: [facility]       ----- Message -----   From: Vick Rubinstein, PA-C   Sent: 5/24/2023   8:22 AM EDT   To: [facility]     Pt needs to have appt with advanced endoscopy physician regarding his pancreatic cyst    ----- Message -----   From: Annette Saenz MD   Sent: 5/23/2023  10:02 AM EDT   To: Vick Rubinstein, PA-C Hello  Thanks for reaching out     If it's asymptomatic then we can observe it  However, it would be best for one of the advanced physicians to see him in office and discuss this with him  Thank you  Joey Paz     ----- Message -----   From: Luis Angel Paredes PA-C   Sent: 5/17/2023  12:13 PM EDT   To: Zaynab Blanchard MD     HI Dr Ramon Cole,     I have this 66 y/o M Pw/ pmhx sig for for EtOH cirrhosis, Traore's esophagus, history of peptic ulcer disease, chronic pancreatitis with pseudocyst, COPD, HTN, seizure disorder, tobacco use disorder  I saw him up in [place]'s for follow-up  He is asymptomatic at this time, no post-prandial pain, poor appetite, weight loss, no abd pain or steatorrhea  I was surprised given the size  I was reading about pseudocysts, even large ones, and if asymptomatic do you think okay to continue to monitor? I wasn't sure if this new prominence of the PD is something that I should investigate further or if he should have repeat EUS?     02/2023: CT Chest:   Increased size of the partially visualized 6 9 cm pancreatic head pseudocyst since 5/23/2022 and new mild dilatation of the main pancreatic duct, likely due to mass effect from the pancreatic head pseudocyst  Mildly patulous esophagus with air-fluid levels   02/2023: CT C/A/P: Large pancreatic head pseudocyst measuring 6 4 x 6 5 cm, stable allowing for differences in measurement technique   Some peripheral calcifications noted as before   Continued prominence of the main pancreatic duct may reflect partial obstruction from the pseudocyst itself       Endoscopic history:   EGD: 07/2020: Irregular Z line with findings consistent with short segment Traore's esophagus; normal stomach and duodenum   EGD/EUS: 09/2020: Parenchymal changes in the pancreas suggestive but not diagnostic for chronic pancreatitis  No cysts identified so these likely were acute inflammatory pancreatic collections which have resolved     Thanks in advance for your response and expertise!

## 2023-06-15 ENCOUNTER — OFFICE VISIT (OUTPATIENT)
Dept: GASTROENTEROLOGY | Facility: CLINIC | Age: 58
End: 2023-06-15
Payer: COMMERCIAL

## 2023-06-15 ENCOUNTER — TELEPHONE (OUTPATIENT)
Dept: FAMILY MEDICINE CLINIC | Facility: CLINIC | Age: 58
End: 2023-06-15

## 2023-06-15 ENCOUNTER — TELEPHONE (OUTPATIENT)
Dept: GASTROENTEROLOGY | Facility: MEDICAL CENTER | Age: 58
End: 2023-06-15

## 2023-06-15 VITALS
BODY MASS INDEX: 20.83 KG/M2 | RESPIRATION RATE: 18 BRPM | HEIGHT: 64 IN | HEART RATE: 76 BPM | DIASTOLIC BLOOD PRESSURE: 78 MMHG | SYSTOLIC BLOOD PRESSURE: 134 MMHG | OXYGEN SATURATION: 90 % | WEIGHT: 122 LBS

## 2023-06-15 DIAGNOSIS — Z86.010 HISTORY OF COLON POLYPS: Primary | ICD-10-CM

## 2023-06-15 DIAGNOSIS — K70.30 ALCOHOLIC CIRRHOSIS OF LIVER WITHOUT ASCITES (HCC): ICD-10-CM

## 2023-06-15 DIAGNOSIS — G40.909 SEIZURE DISORDER (HCC): ICD-10-CM

## 2023-06-15 DIAGNOSIS — G47.00 INSOMNIA, UNSPECIFIED TYPE: ICD-10-CM

## 2023-06-15 PROCEDURE — 99215 OFFICE O/P EST HI 40 MIN: CPT | Performed by: INTERNAL MEDICINE

## 2023-06-15 RX ORDER — LEVETIRACETAM 1000 MG/1
TABLET ORAL
Qty: 60 TABLET | Refills: 0 | Status: SHIPPED | OUTPATIENT
Start: 2023-06-15

## 2023-06-15 RX ORDER — LORAZEPAM 1 MG/1
1 TABLET ORAL
Qty: 30 TABLET | Refills: 0 | Status: SHIPPED | OUTPATIENT
Start: 2023-06-15

## 2023-06-15 RX ORDER — POLYETHYLENE GLYCOL 3350, SODIUM CHLORIDE, SODIUM BICARBONATE, POTASSIUM CHLORIDE 420; 11.2; 5.72; 1.48 G/4L; G/4L; G/4L; G/4L
4000 POWDER, FOR SOLUTION ORAL ONCE
Qty: 4000 ML | Refills: 0 | Status: SHIPPED | OUTPATIENT
Start: 2023-06-15 | End: 2023-06-15

## 2023-06-15 NOTE — TELEPHONE ENCOUNTER
----- Message from Trisha Pardo MD sent at 6/15/2023 10:33 AM EDT -----  He has not yet had his MRI/MRCP (scheduled for mid August), but he said he is waiting on a call to have a procedure scheduled  I assume he meant EUS  Please call him and schedule  V  ----- Message -----  From: Sana Arias MD  Sent: 6/14/2023  11:18 AM EDT  To: Trini Martinez MD; Tory Cuevas MD; #    [facility]  He can follow up with Dr Jamar Kamara and he can inform us  Thank you  Carter Farrell    ----- Message -----  From: Cori Hanna  Sent: 6/14/2023  10:46 AM EDT  To: Trini Martinez MD; Sana Arias MD; #    Patient states he can't make it today  He has an appointment with Dr Jamar Kamara in [place] tomorrow  States he can't talk now and if anyone needs to reach him further to leave a voicemail   But as for today there is no way and it's too far  Thank you    ----- Message -----  From: Sana Arias MD  Sent: 6/14/2023   8:42 AM EDT  To: Trini Martinez MD; Tory Cuevas MD; #    I can see today if he can make it at 2pm or 10am on 7/7  Thanks  Carter Farrell    ----- Message -----  From: Cori Hanna  Sent: 6/14/2023   7:27 AM EDT  To: Trini Martinez MD; Sana Arias MD; #    Please advise clerical as to what date and time we can add patient as there are no openings  Thank you    ----- Message -----  From: Sana Arias MD  Sent: 6/13/2023   8:13 AM EDT  To: Trini Martinez MD; Tory Cuevas MD; #    Hello  Did he get an office visit? If not yet then can we schedule him in the next week or so with one of us  Thank you  Carter Farrell    ----- Message -----  From: Amy Peterson  Sent: 5/24/2023   9:39 AM EDT  To: Trini Martinez MD; Sana Arias MD; #    Hello,      Please advise  Thanks,    ----- Message -----  From: Rosalba Dooley  Sent: 5/24/2023   8:36 AM EDT  To: #    Patient needs office visit   Thanks    ----- Message -----  From: Cameron Stein  Sent: 5/24/2023   8:34 AM EDT  To: [facility]      ----- Message -----  From: Husam Alford PA-C  Sent: 5/24/2023   8:22 AM EDT  To: [facility]    Pt needs to have appt with advanced endoscopy physician regarding his pancreatic cyst    ----- Message -----  From: Atilio Brown MD  Sent: 5/23/2023  10:02 AM EDT  To: Husam Alford PA-C    Cat  Thanks for reaching out  If it's asymptomatic then we can observe it  However, it would be best for one of the advanced physicians to see him in office and discuss this with him  Thank you  Gilson Sparks    ----- Message -----  From: Husam Alford PA-C  Sent: 5/17/2023  12:13 PM EDT  To: Atilio Brown MD    HI Dr Srinivas Jurado,     I have this 64 y/o M Pw/ pmhx sig for for EtOH cirrhosis, Traore's esophagus, history of peptic ulcer disease, chronic pancreatitis with pseudocyst, COPD, HTN, seizure disorder, tobacco use disorder  I saw him up in [place]'s for follow-up  He is asymptomatic at this time, no post-prandial pain, poor appetite, weight loss, no abd pain or steatorrhea  I was surprised given the size  I was reading about pseudocysts, even large ones, and if asymptomatic do you think okay to continue to monitor?  I wasn't sure if this new prominence of the PD is something that I should investigate further or if he should have repeat EUS?     02/2023: CT Chest:   Increased size of the partially visualized 6 9 cm pancreatic head pseudocyst since 5/23/2022 and new mild dilatation of the main pancreatic duct, likely due to mass effect from the pancreatic head pseudocyst  Mildly patulous esophagus with air-fluid levels  02/2023: CT C/A/P: Large pancreatic head pseudocyst measuring 6 4 x 6 5 cm, stable allowing for differences in measurement technique   Some peripheral calcifications noted as before   Continued prominence of the main pancreatic duct may reflect partial obstruction from the pseudocyst itself     Endoscopic history:  EGD: 07/2020: Irregular Z line with findings consistent with short segment Traore's esophagus; normal stomach and duodenum  EGD/EUS: 09/2020: Parenchymal changes in the pancreas suggestive but not diagnostic for chronic pancreatitis  No cysts identified so these likely were acute inflammatory pancreatic collections which have resolved    Thanks in advance for your response and expertise!

## 2023-06-15 NOTE — TELEPHONE ENCOUNTER
Called and spoke with patient in regards to scheduling an appt to discuss Ativan medication change/decrease due to liver  appt schedule for 7/5/23      Thank you,    Jael Starr

## 2023-06-15 NOTE — TELEPHONE ENCOUNTER
Spoke to patient he was unsure when his office visit will be in [place] and when his Colon/EGD is scheduled for  Clarified for patient 7/20 office visit at [facility] and Colon/EGD scheduled for 08/22/2023  Pt did not have any further questions

## 2023-06-15 NOTE — PROGRESS NOTES
[facility] - Outpatient Follow-up Note  Phi Cid 65 y o  male MRN: [id number]  Encounter: [id number]          ASSESSMENT AND PLAN:      Summary:    Mr Michael Gilmore is a 65y o  year old male with cirrhosis secondary to Chronic alcohol abuse which is well compensated  Problem List and Plan:    Cirrhosis: Current MELD-Na Score is 6  Etiology is likely alcohol related  Extensive work-up ruling out other underlying causes was done  He has no clinical indication for liver transplant evaluation  Volume: No history of edema or ascites  Will continue to monitor with serial physical exams on subsequent office visits  Mr Michael Gilmore will contact our office if he should develop abdominal distention or lower extremity edema     Hepatic Encephalopathy: No history of hepatic encephalopathy  Mr Michael Gilmore and his family/care giver are aware of the signs/symptoms of hepatic encephalopathy and understand to seek immediate medical attention should they arise     Esophageal Varices: Last EGD done in 2020 showing no evidence of portal hypertension  Repeat EGD due now  Hepatoma Screening: Last ultrasound done a few weeks ago showing evidence of cirrhosis without portal hypertension or liver mass  Imaging again showed a large complex pancreatic cyst for which an MRI/MRCP has been requested and scheduled for mid August   Will plan for repeat hepatoma screening in January or February 2024  Colon Cancer Screening: Mr Michael Gilmore last had a screening colonoscopy in 2020 and is due for colonoscopy now based on national colorectal cancer screening guidelines     Nutritional status: Moderate sarcopenia noted  Encouraged high protein snacking, low salt diet  If weight loss evident, will refer to nutritional counseling  Health Maintenance:  Mr Michael Gilmore was counseled to avoid alcohol and tobacco products    Mr Michael Gilmore was also advised to avoid raw seafood/oysters and from swimming in unchlorinated sr, particularly from the [facility], due to increased risk of infection from Vibrio Vulnificus  Mr Nick Enriquez was also instructed to seek immediate medical attention should he develop fevers over 101 F, evidence of GI bleeding (black or bloody stools, bloody or coffee ground emesis) or confusion  Mr Nick Enriquez was advised to avoid NSAIDs, if possible, due to increase risk of renal dysfunction, and advised that if he should use acetaminophen, dose should not exceed 2 grams/day  Mr Nick Enriquez was recommend to remain up to date with adult vaccination, including influenza, pneumovax and meningococcus  Inactivated HSV and zoster vaccine is safe and encouraged by PCP  Mr Nick Enriquez was instructed to call either our office or that of his referring doctor should he develop worsening symptoms related to lower extremity edema, ascites, jaundice or excessive bruising/bleeding  FOLLOW-UP:  Return in about 6 months (around 12/15/2023)  VISIT DIAGNOSES AND ORDERS:      1  History of colon polyps    2  Alcoholic cirrhosis of liver without ascites ([facility] [address] )      Orders Placed This Encounter   Procedures   • CBC and differential   • Protime-INR   • Comprehensive metabolic panel   • Colonoscopy   • EGD     ______________________________________________________________________    SUBJECTIVE:       Interval Update 6/15/23  Patient is a pleasant 56-year-old male with medical history as outlined below who comes in today to establish care with hepatology after last seeing Juanita ZARATE a few weeks ago after imaging confirmed the presence of cirrhosis  Patient had updated blood work and elastography done  Blood work showed normal LFTs except for mildly elevated alk phos at 124  Platelets were normal   Abdominal ultrasound done on 5/10/2023 showed a cirrhotic appearing liver without ascites as well as a complex pancreatic head cyst measuring 3 6 x 3 2 x 3 cm without evidence of biliary ductal dilatation    Elastography suggested F4 fibrosis and no evidence of steatosis  As reviewed in history below patient has a history of alcohol use which she quit little over 1 year ago without relapse  Mr Chris Alarcon denies recent or history of yellow eyes/skin, dark urine, GI bleeding, abdominal distention with fluid, lower extremity swelling, easy bruising, excessive bleeding, pruritus or confusion  He denies abdominal pain, nausea, vomiting, heartburn, reflux, difficulty swallowing, early satiety, bloating, diarrhea, constipation or straining with passing stools  His main complaints are extensive MSK pain which she associates with his chronic back issues including becka placement in his spine  He recently had an MRI of the spine done on June 4  History:    HPI 5/4/23:  Lisa Lozoya  SUBJECTIVE: Patient is a 65 y o  male who presents today for follow-up regarding abnormal finding on CT scan  Past medical history significant for EtOH cirrhosis, Traore's esophagus, history of peptic ulcer disease, chronic pancreatitis with pseudocyst, COPD, HTN, seizure disorder, tobacco use disorder      Patient is new to me  He previously followed with Dr Alexia Lundberg, last seen in 08/2022  He was most recently hospitalized in 02/2023 and 03/2023 with hyponatremia  During both hospitalizations, there were incidental abnormal findings of GI tract noted, noting a large pancreatic head pseudocyst with peripheral calcifications and prominence of the main PD, as well as a dilated distal esophagus      05/04/23:      Patient has been feeling much better since discharge from hospital   He notes only rare heartburn with spicy intake, denies any significant indigestion, nausea, vomiting, postprandial abdominal pain  His weight has been stable, in fact he shares he was able to gain weight over the past couple of months  He feels his appetite is good  He denies any dysphagia, odynophagia, early satiety  He is still smoking 1 5 PPD    He has been sober of alcohol for approximately 1 year      He shares he is moving his bowels about once daily  He denies any straining, BRBPR or melena  No chronic diarrhea or steatorrhea  No pain related to defecation  He denies any yellowing of the eyes or the skin  He denies any new abdominal swelling  No new lower extremity edema or acholic stools      Etoh: sober x 1 year   Tobacco: 1 5 PPD   NSAIDs: avoids     02/2023: Hb 11 6, Hct 33 3, , Plt 168, Na 126, BUN 12, Cr 0 71   02/2023: CT Chest:   Increased size of the partially visualized 6 9 cm pancreatic head pseudocyst since 5/23/2022 and new mild dilatation of the main pancreatic duct, likely due to mass effect from the pancreatic head pseudocyst  Mildly patulous esophagus with air-fluid levels  02/2023: CT C/A/P: Large pancreatic head pseudocyst measuring 6 4 x 6 5 cm, stable allowing for differences in measurement technique   Some peripheral calcifications noted as before   Continued prominence of the main pancreatic duct may reflect partial obstruction from the pseudocyst itself     Endoscopic history:  EGD: 07/2020: Irregular Z line with findings consistent with short segment Traore's esophagus; normal stomach and duodenum  EGD/EUS: 09/2020: Parenchymal changes in the pancreas suggestive but not diagnostic for chronic pancreatitis  No cysts identified so these likely were acute inflammatory pancreatic collections which have resolved  Colon: 01/2020: two small ascending colon polyps; Repeat in 3 years due to a personal history of colon polyps (because one of the ascending polyps may have been residual from his previous piecemeal polypectomy  D  Colon, Ascending (Polyp x 2), Biopsy: Fragments of tubular adenoma  REVIEW OF SYSTEMS     Review of Systems   Musculoskeletal: Positive for arthralgias, back pain and gait problem  All other systems reviewed and are negative        Historical Information   Patient Active Problem List   Diagnosis   • Tobacco abuse   • Essential hypertension   • H/O suicide attempt   • H/O cervical spine surgery   • Chronic hyponatremia   • Dietary folate deficiency anemia   • Ventral hernia without obstruction or gangrene   • Hepatomegaly   • Hepatic steatosis   • Hypomagnesemia   • Elevated alkaline phosphatase level   • Alcoholic hepatitis without ascites   • Vitamin D deficiency   • Iron deficiency   • Urinary retention   • Bladder wall thickening   • Hypophosphatemia   • Generalized weakness   • Malnutrition of moderate degree (HCC)   • Hyponatremia   • Hypokalemia   • Continuous chronic alcoholism (HCC)   • Incisional hernia   • Hx of seizure disorder   • Seizure-like activity (HCC)   • Diarrhea   • Abnormality of pancreatic duct   • Allergic rhinitis   • Microcytic anemia   • Abdominal wound dehiscence   • Cervical disc disorder with myelopathy   • Cervical spinal stenosis   • Depression   • Left foot drop   • Lumbar radiculopathy   • Neuropathy involving both lower extremities   • Peripheral neuropathy   • T6 vertebral fracture (HCC)   • Alcoholic cirrhosis of liver without ascites (HCC)   • Thrombocytopenia (HCC)   • Closed fracture of left olecranon process, initial encounter   • Iron deficiency anemia due to chronic blood loss   • Duodenal ulcer   • Tubular adenoma   • Traore esophagus   • Celiac artery stenosis (HCC)   • Pancreatic mass   • Pancytopenia (HCC)   • Tobacco use   • Constipation   • Transaminitis   • Lesion of spleen   • Left anterior fascicular block   • Abnormal EKG   • Acute on chronic pancreatitis [facility])   • Pancreatic pseudocyst   • COPD (chronic obstructive pulmonary disease) (HCC)   • Ileus (HCC)   • Ambulatory dysfunction   • Current every day smoker   • Fall   • Hx of duodenal ulcer   • Neck pain   • Alcohol use   • Thoracic compression fracture (HCC)   • Aortic ectasia (HCC)   • Rib fractures   • Seizure disorder (HCC)   • Hypoxia   • Traore's esophagus   • Elevated d-dimer   • COVID-19 virus infection   • "Hypocalcemia   • Clavicular fracture   • Low serum cortisol level   • Chronic anemia   • Osteoporosis with current pathological fracture   • Abnormal CXR   • Moderate protein-calorie malnutrition (HCC)   • Esophageal abnormality   • Insomnia   • History of alcohol abuse   • Hypoglycemia   • Hypotension   • Closed compression fracture of third lumbar vertebra (HCC)     Social History     Substance and Sexual Activity   Alcohol Use Yes   • Alcohol/week: 42 0 standard drinks of alcohol   • Types: 42 Cans of beer per week    Comment: a six pack a day     Social History     Substance and Sexual Activity   Drug Use No     Social History     Tobacco Use   Smoking Status Every Day   • Packs/day: 3 00   • Years: 35 00   • Total pack years: 105 00   • Types: Cigarettes   Smokeless Tobacco Never       Meds/Allergies       Current Outpatient Medications:   •  albuterol (Ventolin HFA) 90 mcg/act inhaler  •  Cholecalciferol 25 MCG (1000 UT) tablet  •  cyanocobalamin (VITAMIN B-12) 100 mcg tablet  •  Diclofenac Sodium (VOLTAREN) 1 %  •  docusate sodium (COLACE) 100 mg capsule  •  ergocalciferol (VITAMIN D2) 49,509 units  •  folic acid (FOLVITE) 1 mg tablet  •  hydrOXYzine HCL (ATARAX) 50 mg tablet  •  levETIRAcetam (KEPPRA) 1000 MG tablet  •  lisinopril (ZESTRIL) 2 5 mg tablet  •  LORazepam (ATIVAN) 1 mg tablet  •  midodrine (PROAMATINE) 5 mg tablet  •  Multiple Vitamin (TAB-A-BONI PO)  •  multivitamin (THERAGRAN) TABS  •  naproxen (Naprosyn) 500 mg tablet  •  omeprazole (PriLOSEC) 20 mg delayed release capsule  •  polyethylene glycol-electrolytes (TriLyte) 4000 mL solution  •  sodium chloride 1 g tablet  •  thiamine 100 MG tablet    No Known Allergies        Objective     Blood pressure 134/78, pulse 76, resp  rate 18, height 5' 4\" (1 626 m), weight 55 3 kg (122 lb), SpO2 90 %  Body mass index is 20 94 kg/m²  PHYSICAL EXAM:      Physical Exam  Constitutional:       General: He is not in acute distress    HENT:      Head: " Normocephalic and atraumatic  Mouth/Throat:      Mouth: Mucous membranes are moist    Eyes:      General: No scleral icterus  Extraocular Movements: Extraocular movements intact  Cardiovascular:      Rate and Rhythm: Normal rate and regular rhythm  Heart sounds: No murmur heard  Pulmonary:      Effort: Pulmonary effort is normal       Breath sounds: Normal breath sounds  Abdominal:      General: Abdomen is flat  There is no distension  Palpations: Abdomen is soft  Musculoskeletal:         General: No swelling  Cervical back: Normal range of motion  Skin:     Coloration: Skin is not jaundiced  Neurological:      Mental Status: He is oriented to person, place, and time     Psychiatric:         Mood and Affect: Mood normal          Lab Results:   Lab Results   Component Value Date    BUN 5 05/10/2023    CL 92 (L) 05/10/2023    CO2 24 05/10/2023    CREATININE 0 64 05/10/2023    GLUCOSE 149 (H) 07/28/2017    K 4 6 05/10/2023     12/31/2015     Lab Results   Component Value Date    HCT 41 5 05/10/2023    HGB 14 2 05/10/2023    MCV 95 05/10/2023     05/10/2023    WBC 6 11 05/10/2023     Lab Results   Component Value Date    ALT 8 05/10/2023    AST 29 05/10/2023    BILIDIR 0 12 02/27/2023    BILITOT 0 47 12/31/2015    INR 1 01 05/10/2023    TP 7 9 05/10/2023      Lab Results   Component Value Date    FERRITIN 24 05/10/2023    IRON 55 (L) 05/10/2023    LABIRON 18 10/26/2015     Lab Results   Component Value Date    CHOL 185 01/02/2016    HDL 57 09/15/2022    TRIG 45 09/15/2022     MELD 3 0: 16 at 5/10/2023 11:33 AM  MELD-Na: 6 at 5/10/2023 11:33 AM  Calculated from:  Serum Creatinine: 0 64 mg/dL (Using min of 1 mg/dL) at 5/10/2023 11:33 AM  Serum Sodium: 125 mmol/L at 5/10/2023 11:33 AM  Total Bilirubin: 0 50 mg/dL (Using min of 1 mg/dL) at 5/10/2023 11:33 AM  Serum Albumin: 4 1 g/dL (Using max of 3 5 g/dL) at 5/10/2023 11:33 AM  INR(ratio): 1 01 at 5/10/2023 11:33 AM  Age at listing (hypothetical): 65 years  Sex: Male at 5/10/2023 11:33 AM        Radiology Results:   MRI cervical spine wo contrast    Result Date: 6/8/2023  Narrative: MRI CERVICAL SPINE WITHOUT CONTRAST INDICATION: M54 12: Radiculopathy, cervical region Z98 1: Arthrodesis status  COMPARISON: 8/12/2014 and CT from 5/20/2022 TECHNIQUE:  Multiplanar, multisequence imaging of the cervical spine was performed    IMAGE QUALITY:  Diagnostic FINDINGS: ALIGNMENT: The patient is status post anterior cervical discectomy and spinal fusion from C5-C7  Patient is also status post posterior spinal fusion from C4-T1  There is trace anterolisthesis at C7-T1  MARROW SIGNAL:  Normal marrow signal is identified within the visualized bony structures  No discrete marrow lesion  CERVICAL AND VISUALIZED THORACIC CORD:  Normal signal within the visualized cord  PREVERTEBRAL AND PARASPINAL SOFT TISSUES:  Normal  VISUALIZED POSTERIOR FOSSA:  The visualized posterior fossa demonstrates no abnormal signal  CERVICAL DISC SPACES: C2-C3: There is a disc osteophyte complex with uncovertebral spurring  There is hypertrophy of the posterior arch  There is moderate canal stenosis with contact of the cord  There is mild to moderate foraminal narrowing bilaterally  C3-C4: There is a disc osteophyte complex with uncovertebral spurring  There is hypertrophy of the posterior arch  There is mild to moderate canal stenosis  There is mild to moderate foraminal narrowing  C4-C5: Canal is decompressed by laminectomy  No significant foraminal narrowing  C5-C6: Canal is decompressed by laminectomy  No significant foraminal narrowing  C6-C7: Canal is decompressed by laminectomy  No significant foraminal narrowing  C7-T1: No significant canal stenosis or foraminal narrowing  Canal is decompressed by laminectomy  UPPER THORACIC DISC SPACES:  Normal  OTHER FINDINGS:  None  Impression: Postoperative changes and multilevel cervical spondylosis, as described above  Interval development of cervical spondylosis at C2-C3 and C3-C4 since MRI from 8/12/2014 as described above  No intrinsic cord signal abnormality seen  Workstation performed: [id number]     XR entire spine (scoliosis) 2-3 vw    Result Date: 6/8/2023  Narrative: XR ENTIRE SPINE (SCOLIOSIS) 2-3 VW INDICATION:   R52: Pain, unspecified  COMPARISON: CT 2/25/2023, x-ray lumbar spine 6/2/2023 and 5/9/2023 FINDINGS: No significant scoliotic curvature  There are 12 rib-bearing thoracic-type vertebrae and 5 non-rib-bearing lumbar-type vertebrae without a vertebral anomaly  Compression deformities of L3, T11-T12, T6-T8, T2-T4 appear similar to prior CT  Stable accentuation of thoracic kyphosis  Sacral sufficiency fracture again noted  C5-C7 ACDF  Posterior cervical fusion hardware from C4-T1 with fractured right T1 pedicle screw  Age-appropriate multilevel thoracic and lumbar degenerative change  Impression: No significant scoliotic curvature  Multilevel thoracolumbar compression deformities appear similar to prior CT  Stable accentuation of thoracic kyphosis  Sacral sufficiency fracture again noted  Lower cervical fusion hardware  Fractured T1 pedicle screw  The study was marked in EPIC for significant notification  SCOLIOSIS SEVERITY, MELGOZA ANGLE RANGES: Asymmetry:     < 10 degrees Mild:            10 - 24 degrees Moderate:    25 - 44 degrees Significant:  45 - 69 degrees Severe:            70+ degrees Workstation performed: [id number]     XR spine lumbar 2 or 3 views injury    Result Date: 6/7/2023  Narrative: LUMBAR SPINE INDICATION:   R52: Pain, unspecified  COMPARISON: 5/9/2023 VIEWS:  XR SPINE LUMBAR 2 OR 3 VIEWS INJURY FINDINGS: There are 5 non rib bearing lumbar vertebral bodies  Mild L3 compression deformity  Alignment is unremarkable without dynamic instability  Age-appropriate lumbar degenerative changes are seen  The pedicles appear intact  There are atherosclerotic calcifications   Soft tissues are otherwise unremarkable  Impression: Mild L3 compression deformity  Correlate with clinical exam  Degenerative changes as described  The study was marked in EPIC for significant notification  Workstation performed: [id number]     XR spine cervical complete 4 or 5 vw non injury    Result Date: 6/7/2023  Narrative: CERVICAL SPINE INDICATION:   M54 12: Radiculopathy, cervical region Z98 1: Arthrodesis status T84 216A: Breakdown (mechanical) of internal fixation device of vertebrae, initial encounter  COMPARISON: 7/30/2017 VIEWS:  XR SPINE CERVICAL COMPLETE 4 OR 5 VW NON INJURY FINDINGS: Status post posterior fusion C4-T1 and ACDF C5-T1 without with fractured right T1 pedicle screw as before  Normal alignment without subluxation including on dynamic views  Endplate change with mild loss of disc height at mid and lower cervical levels  The neuroforamina are patent  The prevertebral soft tissues are within normal limits  The lung apices are clear  Impression: Stable postoperative change after C4-T1 posterior fusion and ACDF at C5-T1 with redemonstration of a fractured right T1 pedicle screw  Degenerative change noted without dynamic instability   Workstation performed: [id number]         Ramon Mota MD

## 2023-06-15 NOTE — PATIENT INSTRUCTIONS
As discussed today:    Medications:  Continue taking your current medications without changes  Laboratory Testing (Listed below):  Please have blood work drawn in mid August   Please have an upper endoscopy scheduled in 1-2 months  You are due for a colonoscopy now and can do it with your EGD  Avoid alcohol and tobacco products  Also avoid raw seafood/oysters and avoid swimming/wading in unchlorinated sr, particularly from the [facility], due to increased risk of infection from a bacteria called Vibrio Vulnificus  Avoid medications called NSAID's (Motrin/Ibuprofen, Naproxen/Naprosyn/Aleve) if possible, due to increase risk of kidney dysfunction, and if you use medications containing acetaminophen (Tylenol, percocet, Endocet, and many cough/cold medications), the dose should not exceed 2 grams/day  Seek immediate medical attention if you develop fevers over 101 F, have evidence of GI bleeding (black or bloody stools, bloody or coffee ground emesis) or confusion  Call our office if you develop worsening symptoms related to lower extremity edema, ascites, jaundice or excessive bruising/bleeding

## 2023-06-15 NOTE — TELEPHONE ENCOUNTER
Patient called because he is at the pharmacy now and is waiting for these medications to be called in  He has no way back to the pharmacy and is sitting there until scripts are sent in  He called on 6/9/23 and refill request was sent, but they were never filled  Can someone please review and fill ASAP! Patient expecting my call back shortly

## 2023-06-16 ENCOUNTER — OFFICE VISIT (OUTPATIENT)
Dept: OBGYN CLINIC | Facility: HOSPITAL | Age: 58
End: 2023-06-16
Payer: COMMERCIAL

## 2023-06-16 VITALS
WEIGHT: 121.91 LBS | DIASTOLIC BLOOD PRESSURE: 82 MMHG | BODY MASS INDEX: 20.81 KG/M2 | SYSTOLIC BLOOD PRESSURE: 125 MMHG | HEART RATE: 82 BPM | HEIGHT: 64 IN

## 2023-06-16 DIAGNOSIS — T84.216A HARDWARE FAILURE OF ANTERIOR COLUMN OF SPINE (HCC): ICD-10-CM

## 2023-06-16 DIAGNOSIS — M54.12 RADICULOPATHY, CERVICAL REGION: ICD-10-CM

## 2023-06-16 DIAGNOSIS — M25.552 LEFT HIP PAIN: Primary | ICD-10-CM

## 2023-06-16 PROCEDURE — 99213 OFFICE O/P EST LOW 20 MIN: CPT | Performed by: ORTHOPAEDIC SURGERY

## 2023-06-16 NOTE — PROGRESS NOTES
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 since accident  R hand dominant  Currently describes ongoing neck pain that worsens throughout the day  He notes pain is about 6/10 upon waking up in the morning and is a 10/10 by the time he goes to bed at night  He feels like he is unable to hold up his neck when he walks  Denies radiation of pain into upper extremities or new onset numbness/tingling  Notes he drops items, however this is unchanged since his accident/injury 7-10 years ago  Also with ongoing balance issues  Also notes recent fall about 1 month ago at onset of worsening mid/low back pain  He also sustained right shoulder injury at that time which is he is being evaluated and treated for by Dr Samaniego Lank  He denies any lower extremity pain or new numbness/tingling  Denies lower extremity weakness  Denies fever or chills  Denies fine motor changes such as buttoning of shirt or change in gait  Denies heart or lung disease  Denies diabetes  Smokes about 1 5 ppd  Conservative therapy includes the following:  Naproxen without relief  Denies recent steroid injections  Denies recent formal physical therapy for his neck or back  Is currently in physical therapy for his right shoulder  Previous was in aqua therapy a few years ago with some improvement  These therapeutic modalities were ineffective  The patient has noticed significant impairment in performing the following ADLs: Tabitha Underwood has been on disability since accident 7-10 years ago  He is able to care for himself at home      23 Update  Lizet Dan is here for follow-up  Obtained MRI in the interim  He continues with neck pain, difficulty maintaining horizontal gaze  He has diffuse pain in his bilateral upper and lower extremities as well as his lower back          FAMILY HISTORY   Family History   Problem Relation Age of Onset   • Breast cancer Mother    • Prostate cancer Father    • Skin cancer Brother        PAST MEDICAL HISTORY:   Past Medical History:   Diagnosis Date   • Alcohol abuse    • Traore esophagus    • Bowel obstruction (HCC)    • Bowel perforation ([facility] )    • Cardiac disease    • Continuous chronic alcoholism ([facility] ) 10/5/2017   • COPD (chronic obstructive pulmonary disease) ([facility])    • History of shoulder surgery     Right shoulder   • History of transfusion    • Hx of cervical spine surgery    • Hypertension    • Incisional hernia 10/8/2017   • MI, old    • Mitral regurgitation    • Psychiatric disorder    • Seizures (HCC)        MEDICATIONS:  Current Outpatient Medications   Medication Sig Dispense Refill   • albuterol (Ventolin HFA) 90 mcg/act inhaler Inhale 2 puffs every 6 (six) hours as needed for wheezing 8 g 1   • Cholecalciferol 25 MCG (1000 UT) tablet Take 1 tablet (1,000 Units total) by mouth daily 30 tablet 2   • cyanocobalamin (VITAMIN B-12) 100 mcg tablet Take 1 tablet (100 mcg total) by mouth daily 30 tablet 5   • Diclofenac Sodium (VOLTAREN) 1 % Apply 2 g topically 4 (four) times a day 100 g 1   • docusate sodium (COLACE) 100 mg capsule Take 1 capsule (100 mg total) by mouth 2 (two) times a day as needed for constipation 30 capsule 0   • ergocalciferol (VITAMIN D2) 50,000 units Take 1 capsule (50,000 Units total) by mouth once a week 13 capsule 1   • folic acid (FOLVITE) 1 mg tablet Take 1 tablet (1 mg total) by mouth daily 30 tablet 2   • hydrOXYzine HCL (ATARAX) 50 mg tablet      • levETIRAcetam (KEPPRA) 1000 MG tablet TAKE 1 TABLET BY MOUTH EVERY 12 HOURS 60 tablet 0   • lisinopril (ZESTRIL) 2 5 mg tablet Take 1 tablet (2 5 mg total) by mouth daily 90 tablet 2   • LORazepam (ATIVAN) 1 mg tablet Take 1 tablet (1 mg total) by mouth daily at bedtime 30 tablet 0   • midodrine (PROAMATINE) 5 mg tablet Take 1 tablet (5 mg total) by mouth 3 (three) times a day before meals 90 tablet 0   • Multiple Vitamin (TAB-A-BONI PO) Take 1 tablet by mouth daily     • multivitamin (THERAGRAN) TABS Take 1 tablet by mouth daily     • naproxen (Naprosyn) 500 mg tablet Take 1 tablet (500 mg total) by mouth 2 (two) times a day with meals 30 tablet 0   • omeprazole (PriLOSEC) 20 mg delayed release capsule Take 1 capsule (20 mg total) by mouth daily 30 capsule 11   • polyethylene glycol-electrolytes (TriLyte) 4000 mL solution Take 4,000 mL by mouth once for 1 dose Take 4000 mL by mouth once for 1 dose  Use as directed 4000 mL 0   • sodium chloride 1 g tablet Take 2 tablets (2 g total) by mouth 3 (three) times a day with meals 240 tablet 3   • thiamine 100 MG tablet Take 1 tablet (100 mg total) by mouth daily 30 tablet 2     No current facility-administered medications for this visit  PAST SURGICAL HISTORY:  Past Surgical History:   Procedure Laterality Date   • APPENDECTOMY     • BACK SURGERY     • CHOLECYSTECTOMY     • ESOPHAGOGASTRODUODENOSCOPY N/A 11/28/2016    Procedure: ESOPHAGOGASTRODUODENOSCOPY (EGD); Surgeon: Megan Rodriguez MD;  Location:  GI LAB;   Service:    • GALLBLADDER SURGERY     • LAPAROTOMY N/A 10/25/2016    Procedure: LAPAROTOMY EXPLORATORY;  Surgeon: Kristopher Dumont MD;  Location: [facility];  Service:    • MOUTH SURGERY     • PERCUTANEOUS PINNING FEMORAL NECK FRACTURE     • SHOULDER SURGERY Right    • SHOULDER SURGERY     • SMALL INTESTINE SURGERY     • STOMACH SURGERY      bal surgery       SOCIAL HISTORY:  Social History     Socioeconomic History   • Marital status: Single     Spouse name: Not on file   • Number of children: Not on file   • Years of education: Not on file   • Highest education level: Not on file Occupational History   • Not on file   Tobacco Use   • Smoking status: Every Day     Packs/day: 3 00     Years: 35 00     Total pack years: 105 00     Types: Cigarettes   • Smokeless tobacco: Never   Vaping Use   • Vaping Use: Former   Substance and Sexual Activity   • Alcohol use: Yes     Alcohol/week: 42 0 standard drinks of alcohol     Types: 42 Cans of beer per week     Comment: a six pack a day   • Drug use: No   • Sexual activity: Not Currently     Partners: Female   Other Topics Concern   • Not on file   Social History Narrative   • Not on file     Social Determinants of Health     Financial Resource Strain: Not on file   Food Insecurity: No Food Insecurity (2/27/2023)    Hunger Vital Sign    • Worried About Running Out of Food in the Last Year: Never true    • Ran Out of Food in the Last Year: Never true   Transportation Needs: No Transportation Needs (2/27/2023)    PRAPARE - Transportation    • Lack of Transportation (Medical): No    • Lack of Transportation (Non-Medical):  No   Physical Activity: Not on file   Stress: Not on file   Social Connections: Not on file   Intimate Partner Violence: Not on file   Housing Stability: Low Risk  (2/27/2023)    Housing Stability Vital Sign    • Unable to Pay for Housing in the Last Year: No    • Number of Places Lived in the Last Year: 1    • Unstable Housing in the Last Year: No     ALLERGIES:  No Known Allergies    ROS:   Constitutional:  No fever, chills, night sweats, loss of appetite   HEENT No no sore throat, difficulty swallowing   Cardiovascular:  No chest pain, palpitations, BLE edema, ALVARADO   Respiratory:  No SOB, coughing, chest congestion   Gastrointestinal:  No nausea, vomiting, abdominal pain   Genitourinary:  No dysuria, hematuria, urinary urgency/frequency   Musculoskeletal:  See HPI   Skin:  No rash, erythema, edema   Neurologic:  See HPI   Psychiatric Illness:  No depression, anxiety, mood disorder, substance abuse disorder     PHYSICAL EXAM:  BP "125/82   Pulse 82   Ht 5' 4\" (1 626 m)   Wt 55 3 kg (121 lb 14 6 oz)   BMI 20 93 kg/m²        General:  Well-developed,appears well, no acute distress   Respiratory:  No SOB, no abnormal effort (use of accessory muscles)  GI / Abdominal:  Soft  No abdominal masses or tenderness  Nondistended  Gait & balance No evidence of myelopathic gait       Cervical  spine range of motion:  Limited neck ROM    +TTP throughout paraspinal musculature       Neurologic:  Upper Extremity Motor Function    Right  Left    Deltoid  Unable to test 5/5    Bicep  4/5  5/5    Wrist extension  5/5 5/5    Tricep  4/5  5/5    Finger flexion/  5/5 5/5    Hand intrinsic  5/5 5/5      Lower Extremity Motor Function    Right  Left    Iliopsoas  5/5 5/5    Quadriceps 5/5 5/5   Tibialis anterior  5/5 5/5    EHL  5/5 5/5    Gastroc  muscle  5/5 5/5    Heel rise  5/5 5/5    Toe rise  5/5 5/5      Sensory: light touch is intact to bilateral upper and lower extremities     Reflexes:    Right Left   Biceps 2+ 2+   Triceps 2+ 2+   Brachioradialis 2+ 2+   Patellar 1+ 1+   Achilles 1+ 1+   Babinski neg neg     Other tests:  Spurling's: negative  Turner's: negative  Inverted radial reflex: negative  Clonus: negative  Shoulder: painless active & passive ROM left; decreased shoulder active & passive ROM; pain with palpation  Tandem gait: positive    IMAGING Review: I have personally reviewed the images and these are my findings:  Cervical spine xray from 6/2/2023: C4-T1 bilateral lateral mass screw and becka construct with hardware failure, also with C5-7 ACDF construct, cervical thoracic kyphosis      Lumbar spine xray from 5/9/2023 and 6/2/2023: Multilevel lumbar spondylosis, mild facet arthropathy, no apparent spondylolisthesis, no obvious instability on flexion-extension radiographs, L3 compression deformity      Scoliosis series xray from 6/2/2023: Severe cervical thoracic kyphosis, varying levels of chronic appearing thoracic " compression fractures, +SVA      Cervical MRI from 6/4/2023: Postsurgical changes consistent with anterior and posterior cervical fusion, there is cervical thoracic kyphosis as well as occipitocervical lordosis, with moderate central stenosis at C3-4, no obvious cord signal abnormalities appreciated      ASSESSMENT / Medical Decision Making (MDM):  65year old male with history of neck and back pain without radicular symptoms  PMH cervical fusion surgery  Here for follow-up after cervical MRI  No incontinence of bowel or bladder, no fever, no chills, night sweats  Physical exam showing right upper extremity weakness     Imaging reviewed as above  Findings most notable for cervicothoracic kyphosis, cervical fusion construct with hardware failure  Moderate cervical stenosis with no cord signal abnormalities  Impression of Dropped Head Syndrome     Plan: The above clinical, physical and imaging findings were reviewed with the patient  Zehra Peterson  has a constellation of findings consistent with Dropped Head Syndrome in the setting of cervical thoracic kyphosis, previous cervical fusion with hardware failure, multiple thoracic compression fractures  Fortunately Zehra Peterson remains at his neurologic baseline and functional   He does have difficulty maintaining a horizontal gaze due to his posture  We again discussed the role of surgical intervention but given Januszs medical comorbidities and continued tobacco use, do not recommend any intervention at this time  Zehra Peterson continues without symptoms of myelopathy  Again discussed that surgery would likely require extensive cervical thoracic fusion with possible 3 column osteotomy with a high complication profile  Zehra Peterson states that he would like to pursue this surgery sometime in the future and will work on smoking cessation  We have also provided him with a referral to neurosurgery for second opinion regarding surgery    In addition we did provide him with a referral to our pain management team for possible interventional spine procedures to help with neck pain  Continue with medications as previously prescribed if providing pain/symptom relief  Patient instructed to return to office/ER sooner if symptoms are not improving, getting worse, or new worrisome/neurologic symptoms arise

## 2023-06-20 ENCOUNTER — APPOINTMENT (OUTPATIENT)
Dept: PHYSICAL THERAPY | Facility: CLINIC | Age: 58
End: 2023-06-20
Payer: COMMERCIAL

## 2023-06-22 ENCOUNTER — PREP FOR PROCEDURE (OUTPATIENT)
Dept: GASTROENTEROLOGY | Facility: CLINIC | Age: 58
End: 2023-06-22

## 2023-06-22 ENCOUNTER — OFFICE VISIT (OUTPATIENT)
Dept: UROLOGY | Facility: CLINIC | Age: 58
End: 2023-06-22
Payer: COMMERCIAL

## 2023-06-22 ENCOUNTER — TELEPHONE (OUTPATIENT)
Dept: GASTROENTEROLOGY | Facility: CLINIC | Age: 58
End: 2023-06-22

## 2023-06-22 VITALS — DIASTOLIC BLOOD PRESSURE: 90 MMHG | BODY MASS INDEX: 21.42 KG/M2 | SYSTOLIC BLOOD PRESSURE: 150 MMHG | WEIGHT: 124.8 LBS

## 2023-06-22 DIAGNOSIS — N32.89 BLADDER WALL THICKENING: Primary | ICD-10-CM

## 2023-06-22 DIAGNOSIS — K86.2 PANCREATIC CYST: Primary | ICD-10-CM

## 2023-06-22 DIAGNOSIS — Z12.5 PROSTATE CANCER SCREENING: ICD-10-CM

## 2023-06-22 PROCEDURE — 99213 OFFICE O/P EST LOW 20 MIN: CPT

## 2023-06-22 NOTE — PROGRESS NOTES
6/22/2023    No chief complaint on file  Assessment and Plan    65 y o  male    1  Bladder wall thickening  · This was originally seen on CT imaging from August of 2020 and recommendations at that time were for cystoscopy evaluation due to history of smoking  Most recent CT imaging from 2/25/2023 however showed a normal bladder without thickening  · Denies any lower urinary tract symptoms today or gross hematuria  Follow-up urine testing after last office visit was negative for infection or hematuria  · Follow-up 1 year with repeat UA    2  Prostate Cancer Screening  · PSA 0 6 (3/18/2023)  · BRETT benign during last office exam    · Follow-up 1 year          History of Present Illness  Edgar Green is a 65 y o  male here for follow-up evaluation of acute cystitis  Established patient last seen by me on 3/16/2023 following hospitalization at [address] [place] [address] on 2/25/2023 for hyponatremia  Etiology was felt to be multifactorial, due to SIADH as well as volume depletion  He was also treated for acute cystitis as urine culture was positive for >100,000 cfu/ml Klebsiella pneumoniae and <10,000 cfu/ml proteus species  During his office visit with me he denied any lower urinary tract symptoms and felt that his urinary stream was strong  He did report getting up 3 times per night but was not bothered by this  He had originally been seen by Sarah Junior in September 2020 for evaluation of bladder wall thickening seen on CT imaging from August 2020  Due to his smoking history he was recommended to undergo cystoscopy but never followed up  He did have a CT chest abdomen pelvis with contrast on 2/25/2023 which showed normal bladder and kidneys  Due to his smoking history I recommended checking a repeat urinalysis to rule out any microscopic hematuria    Repeat urine testing was completed on 3/18/2023 and was negative for microscopic hematuria repeat urine culture was also negative  Review of Systems   Constitutional: Negative for chills and fever  HENT: Negative for congestion and sore throat  Respiratory: Negative for cough and shortness of breath  Cardiovascular: Negative for chest pain and leg swelling  Gastrointestinal: Negative for abdominal pain, constipation and diarrhea  Genitourinary: Negative for difficulty urinating, dysuria, frequency, hematuria and urgency  Musculoskeletal: Negative for back pain and gait problem  Skin: Negative for wound  Allergic/Immunologic: Negative for immunocompromised state  Hematological: Does not bruise/bleed easily  Vitals  Vitals:    06/22/23 0929   BP: 150/90   BP Location: Right arm   Patient Position: Sitting   Cuff Size: Adult   Weight: 56 6 kg (124 lb 12 8 oz)       Physical Exam  Vitals reviewed  Constitutional:       General: He is not in acute distress  Appearance: Normal appearance  He is not ill-appearing or toxic-appearing  HENT:      Head: Normocephalic and atraumatic  Eyes:      General: No scleral icterus  Conjunctiva/sclera: Conjunctivae normal    Cardiovascular:      Rate and Rhythm: Normal rate  Pulmonary:      Effort: Pulmonary effort is normal  No respiratory distress  Abdominal:      Tenderness: There is no right CVA tenderness or left CVA tenderness  Hernia: No hernia is present  Musculoskeletal:      Cervical back: Normal range of motion  Right lower leg: No edema  Left lower leg: No edema  Skin:     General: Skin is warm and dry  Coloration: Skin is not jaundiced or pale  Neurological:      General: No focal deficit present  Mental Status: He is alert and oriented to person, place, and time  Mental status is at baseline  Gait: Gait normal    Psychiatric:         Mood and Affect: Mood normal          Behavior: Behavior normal          Thought Content:  Thought content normal          Judgment: Judgment normal  Past History  Past Medical History:   Diagnosis Date   • Alcohol abuse    • Traore esophagus    • Bowel obstruction (HCC)    • Bowel perforation (HCC)    • Cardiac disease    • Continuous chronic alcoholism ([facility] [address] ) 10/5/2017   • COPD (chronic obstructive pulmonary disease) (HCC)    • History of shoulder surgery     Right shoulder   • History of transfusion    • Hx of cervical spine surgery    • Hypertension    • Incisional hernia 10/8/2017   • MI, old    • Mitral regurgitation    • Psychiatric disorder    • [facility]ures [facility] )      Social History     Socioeconomic History   • Marital status: Single     Spouse name: None   • Number of children: None   • Years of education: None   • Highest education level: None   Occupational History   • None   Tobacco Use   • Smoking status: Every Day     Packs/day: 3 00     Years: 35 00     Total pack years: 105 00     Types: Cigarettes   • Smokeless tobacco: Never   Vaping Use   • Vaping Use: Former   Substance and Sexual Activity   • Alcohol use: Yes     Alcohol/week: 42 0 standard drinks of alcohol     Types: 42 Cans of beer per week     Comment: a six pack a day   • Drug use: No   • Sexual activity: Not Currently     Partners: Female   Other Topics Concern   • None   Social History Narrative   • None     Social Determinants of Health     Financial Resource Strain: Not on file   Food Insecurity: No Food Insecurity (2/27/2023)    Hunger Vital Sign    • Worried About Running Out of Food in the Last Year: Never true    • Ran Out of Food in the Last Year: Never true   Transportation Needs: No Transportation Needs (2/27/2023)    PRAPARE - Transportation    • Lack of Transportation (Medical): No    • Lack of Transportation (Non-Medical):  No   Physical Activity: Not on file   Stress: Not on file   Social Connections: Not on file   Intimate Partner Violence: Not on file   Housing Stability: Low Risk  (2/27/2023)    Housing Stability Vital Sign    • Unable to Pay for Housing in the Last Year: No    • Number of Places Lived in the Last Year: 1    • Unstable Housing in the Last Year: No     Social History     Tobacco Use   Smoking Status Every Day   • Packs/day: 3 00   • Years: 35 00   • Total pack years: 105 00   • Types: Cigarettes   Smokeless Tobacco Never     Family History   Problem Relation Age of Onset   • Breast cancer Mother    • Prostate cancer Father    • Skin cancer Brother        The following portions of the patient's history were reviewed and updated as appropriate allergies, current medications, past medical history, past social history, past surgical history and problem list    Imaging:    Results  No results found for this or any previous visit (from the past 1 hour(s)) ]  Lab Results   Component Value Date    PSA 0 6 03/18/2023    PSA 1 1 10/20/2020     Lab Results   Component Value Date    GLUCOSE 149 (H) 07/28/2017    CALCIUM 9 0 05/10/2023     12/31/2015    K 4 6 05/10/2023    CO2 24 05/10/2023    CL 92 (L) 05/10/2023    BUN 5 05/10/2023    CREATININE 0 64 05/10/2023     Lab Results   Component Value Date    WBC 6 11 05/10/2023    HGB 14 2 05/10/2023    HCT 41 5 05/10/2023    MCV 95 05/10/2023     05/10/2023       Please Note:  Voice dictation software has been used to create this document  There may be inadvertent transcriptions errors       LORA Oliva 06/22/23

## 2023-06-22 NOTE — TELEPHONE ENCOUNTER
----- Message from Roopastarla Kathleen sent at 6/21/2023  2:29 PM EDT -----  Regarding: please add him to the spread sheet so the girls can schedule    ----- Message -----  From: Parveen Dickey MD  Sent: 6/15/2023  10:34 AM EDT  To: Hemalatha Pace MD; Jonnathan Tello MD; #    He has not yet had his MRI/MRCP (scheduled for mid August), but he said he is waiting on a call to have a procedure scheduled  I assume he meant EUS  Please call him and schedule  V  ----- Message -----  From: Jonnathan Tello MD  Sent: 6/14/2023  11:18 AM EDT  To: Hemalatha Pace MD; Coco Solomon MD; #    [facility]  He can follow up with Dr Cedrick Aguilar and he can inform us  Thank you  Haydee Denny    ----- Message -----  From: Zakia Carranza  Sent: 6/14/2023  10:46 AM EDT  To: Hemalatha Pace MD; Jonnathan Tello MD; #    Patient states he can't make it today  He has an appointment with Dr Cedrick Aguilar in [place] tomorrow  States he can't talk now and if anyone needs to reach him further to leave a voicemail   But as for today there is no way and it's too far  Thank you    ----- Message -----  From: Jonnathan Tello MD  Sent: 6/14/2023   8:42 AM EDT  To: Hemalatha Pace MD; Coco Solomon MD; #    I can see today if he can make it at 2pm or 10am on 7/7  Thanks  Haydee Denny    ----- Message -----  From: Zakia Carranza  Sent: 6/14/2023   7:27 AM EDT  To: Hemalatha Pace MD; Jonnathan Tello MD; #    Please advise clerical as to what date and time we can add patient as there are no openings  Thank you    ----- Message -----  From: Jonnathan Tello MD  Sent: 6/13/2023   8:13 AM EDT  To: Hemalatha Pace MD; Coco Solomon MD; #    Hello  Did he get an office visit? If not yet then can we schedule him in the next week or so with one of us  Thank you  Haydee Denny    ----- Message -----  From: Ksenia Code  Sent: 5/24/2023   9:39 AM EDT  To: Hemalatha Pace MD; Jonnathan Tello MD; #    Hello,      Please advise        Thanks,    ----- Message -----  From: Hermelinda Church  Sent: 5/24/2023   8:36 AM EDT  To: #    Patient needs office visit  Thanks    ----- Message -----  From: Cipriano High  Sent: 5/24/2023   8:34 AM EDT  To: [facility]      ----- Message -----  From: Isa Piper PA-C  Sent: 5/24/2023   8:22 AM EDT  To: [facility] [place] [facility]    Pt needs to have appt with advanced endoscopy physician regarding his pancreatic cyst    ----- Message -----  From: Milind Gallegos MD  Sent: 5/23/2023  10:02 AM EDT  To: Isa Piper PA-C    Cat  Thanks for reaching out  If it's asymptomatic then we can observe it  However, it would be best for one of the advanced physicians to see him in office and discuss this with him  Thank you  Maribell Flores    ----- Message -----  From: Isa Piper PA-C  Sent: 5/17/2023  12:13 PM EDT  To: Milind Gallegos MD    HI Dr Floridalma Hugo,     I have this 64 y/o M Pw/ pmhx sig for for EtOH cirrhosis, Traore's esophagus, history of peptic ulcer disease, chronic pancreatitis with pseudocyst, COPD, HTN, seizure disorder, tobacco use disorder  I saw him up in [place]'s for follow-up  He is asymptomatic at this time, no post-prandial pain, poor appetite, weight loss, no abd pain or steatorrhea  I was surprised given the size  I was reading about pseudocysts, even large ones, and if asymptomatic do you think okay to continue to monitor?  I wasn't sure if this new prominence of the PD is something that I should investigate further or if he should have repeat EUS?     02/2023: CT Chest:   Increased size of the partially visualized 6 9 cm pancreatic head pseudocyst since 5/23/2022 and new mild dilatation of the main pancreatic duct, likely due to mass effect from the pancreatic head pseudocyst  Mildly patulous esophagus with air-fluid levels  02/2023: CT C/A/P: Large pancreatic head pseudocyst measuring 6 4 x 6 5 cm, stable allowing for differences in measurement technique   Some peripheral calcifications noted as before   Continued prominence of the main pancreatic duct may reflect partial obstruction from the pseudocyst itself     Endoscopic history:  EGD: 07/2020: Irregular Z line with findings consistent with short segment Traore's esophagus; normal stomach and duodenum  EGD/EUS: 09/2020: Parenchymal changes in the pancreas suggestive but not diagnostic for chronic pancreatitis  No cysts identified so these likely were acute inflammatory pancreatic collections which have resolved    Thanks in advance for your response and expertise!

## 2023-06-22 NOTE — TELEPHONE ENCOUNTER
Patients GI provider:  RICARDA LIVINGSTON     Number to return call: [phone]    Reason for call: Pt returning phone call to Kaz Pickard to schedule EUS       Please contact patient to further assist

## 2023-06-23 ENCOUNTER — APPOINTMENT (OUTPATIENT)
Dept: PHYSICAL THERAPY | Facility: CLINIC | Age: 58
End: 2023-06-23
Payer: COMMERCIAL

## 2023-06-23 NOTE — TELEPHONE ENCOUNTER
Scheduled date of EUS(as of today):8/29/23  Physician performing EUS: Dr Salinas  Location of EUS: [facility] reviewed with patient by: Nona/kevin  Clearances: N/A

## 2023-06-27 ENCOUNTER — APPOINTMENT (OUTPATIENT)
Dept: PHYSICAL THERAPY | Facility: CLINIC | Age: 58
End: 2023-06-27
Payer: COMMERCIAL

## 2023-06-28 ENCOUNTER — PATIENT OUTREACH (OUTPATIENT)
Dept: FAMILY MEDICINE CLINIC | Facility: CLINIC | Age: 58
End: 2023-06-28

## 2023-06-30 ENCOUNTER — APPOINTMENT (OUTPATIENT)
Dept: PHYSICAL THERAPY | Facility: CLINIC | Age: 58
End: 2023-06-30
Payer: COMMERCIAL

## 2023-07-03 PROBLEM — S32.10XA SACRAL FRACTURE, CLOSED (HCC): Status: ACTIVE | Noted: 2022-05-27

## 2023-07-03 PROBLEM — D68.9 COAGULOPATHY (HCC): Status: ACTIVE | Noted: 2022-06-20

## 2023-07-03 PROBLEM — S32.009A CLOSED FRACTURE OF TRANSVERSE PROCESS OF LUMBAR VERTEBRA (HCC): Status: ACTIVE | Noted: 2022-05-27

## 2023-07-05 ENCOUNTER — OFFICE VISIT (OUTPATIENT)
Dept: FAMILY MEDICINE CLINIC | Facility: CLINIC | Age: 58
End: 2023-07-05
Payer: COMMERCIAL

## 2023-07-05 ENCOUNTER — PATIENT OUTREACH (OUTPATIENT)
Dept: FAMILY MEDICINE CLINIC | Facility: CLINIC | Age: 58
End: 2023-07-05

## 2023-07-05 ENCOUNTER — TELEPHONE (OUTPATIENT)
Dept: NEPHROLOGY | Facility: CLINIC | Age: 58
End: 2023-07-05

## 2023-07-05 VITALS
SYSTOLIC BLOOD PRESSURE: 138 MMHG | HEART RATE: 86 BPM | RESPIRATION RATE: 18 BRPM | TEMPERATURE: 97.4 F | OXYGEN SATURATION: 93 % | DIASTOLIC BLOOD PRESSURE: 84 MMHG | BODY MASS INDEX: 19.11 KG/M2 | WEIGHT: 129 LBS | HEIGHT: 69 IN

## 2023-07-05 DIAGNOSIS — E87.1 HYPONATREMIA: ICD-10-CM

## 2023-07-05 DIAGNOSIS — Z13.220 SCREENING CHOLESTEROL LEVEL: ICD-10-CM

## 2023-07-05 DIAGNOSIS — K70.30 ALCOHOLIC CIRRHOSIS OF LIVER WITHOUT ASCITES (HCC): ICD-10-CM

## 2023-07-05 DIAGNOSIS — G40.909 SEIZURE DISORDER (HCC): ICD-10-CM

## 2023-07-05 DIAGNOSIS — F51.04 PSYCHOPHYSIOLOGICAL INSOMNIA: Primary | ICD-10-CM

## 2023-07-05 DIAGNOSIS — Z23 NEED FOR VACCINATION: ICD-10-CM

## 2023-07-05 DIAGNOSIS — I10 ESSENTIAL HYPERTENSION: Primary | ICD-10-CM

## 2023-07-05 DIAGNOSIS — Z86.010 HISTORY OF COLON POLYPS: ICD-10-CM

## 2023-07-05 DIAGNOSIS — Z12.5 PROSTATE CANCER SCREENING: ICD-10-CM

## 2023-07-05 LAB
ALBUMIN SERPL BCP-MCNC: 3.8 G/DL (ref 3.5–5)
ALP SERPL-CCNC: 150 U/L (ref 46–116)
ALT SERPL W P-5'-P-CCNC: 20 U/L (ref 12–78)
ANION GAP SERPL CALCULATED.3IONS-SCNC: 3 MMOL/L
AST SERPL W P-5'-P-CCNC: 32 U/L (ref 5–45)
BASOPHILS # BLD AUTO: 0.05 THOUSANDS/ÂΜL (ref 0–0.1)
BASOPHILS NFR BLD AUTO: 1 % (ref 0–1)
BILIRUB SERPL-MCNC: 0.33 MG/DL (ref 0.2–1)
BUN SERPL-MCNC: 10 MG/DL (ref 5–25)
CALCIUM SERPL-MCNC: 9.3 MG/DL (ref 8.3–10.1)
CHLORIDE SERPL-SCNC: 100 MMOL/L (ref 96–108)
CHOLEST SERPL-MCNC: 153 MG/DL
CO2 SERPL-SCNC: 28 MMOL/L (ref 21–32)
CREAT SERPL-MCNC: 0.75 MG/DL (ref 0.6–1.3)
EOSINOPHIL # BLD AUTO: 0.17 THOUSAND/ÂΜL (ref 0–0.61)
EOSINOPHIL NFR BLD AUTO: 2 % (ref 0–6)
ERYTHROCYTE [DISTWIDTH] IN BLOOD BY AUTOMATED COUNT: 14.3 % (ref 11.6–15.1)
GFR SERPL CREATININE-BSD FRML MDRD: 102 ML/MIN/1.73SQ M
GLUCOSE P FAST SERPL-MCNC: 77 MG/DL (ref 65–99)
HCT VFR BLD AUTO: 40.5 % (ref 36.5–49.3)
HDLC SERPL-MCNC: 52 MG/DL
HGB BLD-MCNC: 13.4 G/DL (ref 12–17)
IMM GRANULOCYTES # BLD AUTO: 0.02 THOUSAND/UL (ref 0–0.2)
IMM GRANULOCYTES NFR BLD AUTO: 0 % (ref 0–2)
LDLC SERPL CALC-MCNC: 85 MG/DL (ref 0–100)
LYMPHOCYTES # BLD AUTO: 1.51 THOUSANDS/ÂΜL (ref 0.6–4.47)
LYMPHOCYTES NFR BLD AUTO: 20 % (ref 14–44)
MCH RBC QN AUTO: 31.2 PG (ref 26.8–34.3)
MCHC RBC AUTO-ENTMCNC: 33.1 G/DL (ref 31.4–37.4)
MCV RBC AUTO: 94 FL (ref 82–98)
MONOCYTES # BLD AUTO: 0.75 THOUSAND/ÂΜL (ref 0.17–1.22)
MONOCYTES NFR BLD AUTO: 10 % (ref 4–12)
NEUTROPHILS # BLD AUTO: 5.26 THOUSANDS/ÂΜL (ref 1.85–7.62)
NEUTS SEG NFR BLD AUTO: 67 % (ref 43–75)
NRBC BLD AUTO-RTO: 0 /100 WBCS
PLATELET # BLD AUTO: 207 THOUSANDS/UL (ref 149–390)
PMV BLD AUTO: 11 FL (ref 8.9–12.7)
POTASSIUM SERPL-SCNC: 4.2 MMOL/L (ref 3.5–5.3)
PROT SERPL-MCNC: 9.2 G/DL (ref 6.4–8.4)
PSA SERPL-MCNC: 0.36 NG/ML (ref 0–4)
RBC # BLD AUTO: 4.3 MILLION/UL (ref 3.88–5.62)
SODIUM SERPL-SCNC: 131 MMOL/L (ref 135–147)
TRIGL SERPL-MCNC: 78 MG/DL
WBC # BLD AUTO: 7.76 THOUSAND/UL (ref 4.31–10.16)

## 2023-07-05 PROCEDURE — G0103 PSA SCREENING: HCPCS

## 2023-07-05 PROCEDURE — 80061 LIPID PANEL: CPT | Performed by: INTERNAL MEDICINE

## 2023-07-05 PROCEDURE — 85025 COMPLETE CBC W/AUTO DIFF WBC: CPT | Performed by: INTERNAL MEDICINE

## 2023-07-05 PROCEDURE — T1015 CLINIC SERVICE: HCPCS | Performed by: FAMILY MEDICINE

## 2023-07-05 PROCEDURE — 80053 COMPREHEN METABOLIC PANEL: CPT | Performed by: INTERNAL MEDICINE

## 2023-07-05 RX ORDER — THIAMINE MONONITRATE (VIT B1) 100 MG
100 TABLET ORAL DAILY
COMMUNITY
Start: 2023-06-23

## 2023-07-05 RX ORDER — DOXEPIN HYDROCHLORIDE 25 MG/1
25 CAPSULE ORAL
Qty: 30 CAPSULE | Refills: 0 | Status: SHIPPED | OUTPATIENT
Start: 2023-07-05

## 2023-07-05 RX ORDER — ALPRAZOLAM 0.5 MG/1
0.5 TABLET ORAL
Qty: 30 TABLET | Refills: 0 | Status: SHIPPED | OUTPATIENT
Start: 2023-07-05

## 2023-07-05 RX ORDER — LEVETIRACETAM 1000 MG/1
TABLET ORAL
Qty: 60 TABLET | Refills: 0 | Status: SHIPPED | OUTPATIENT
Start: 2023-07-05

## 2023-07-05 NOTE — PROGRESS NOTES
Assessment/Plan:     Diagnoses and all orders for this visit:    Psychophysiological insomnia  Comments:  Start doxepin  Start weaning down alprazolam; 0.5 mg filled  Repeat BMP in 1 month  PDMP without concerns  RTC 1 month  Orders:  -     doxepin (SINEquan) 25 mg capsule; Take 1 capsule (25 mg total) by mouth daily at bedtime  -     ALPRAZolam (XANAX) 0.5 mg tablet; Take 1 tablet (0.5 mg total) by mouth daily at bedtime as needed for anxiety    Hyponatremia  Comments:  Repeat BMP in 1 month  F/U nephrology  Continue fluid restriction and Na tabs  Orders:  -     Basic metabolic panel; Future    Seizure disorder ([address])  Comments:  Recheck keppra level  Refills pending these results  Orders:  -     Levetiracetam level; Future    Need for vaccination  -     Pneumococcal Conjugate Vaccine 20-valent (Pcv20)  -     Hepatitis A hepatitis B combined vaccine IM    Screening cholesterol level  -     Lipid Panel with Direct LDL reflex; Future    Other orders  -     thiamine (VITAMIN B1) 100 mg tablet; Take 100 mg by mouth daily            Return in about 4 weeks (around 8/2/2023) for Annual physical.       Subjective:        Patient ID: Tete Johnson is a 65 y.o. male. Chief Complaint   Patient presents with   • Medication Refill     Patient is here to discuss changing his ativan to something as per  due to liver. Patient states he is having issues getting his med refills. He states he has a hard time getting his Keppra refilled and would like refills on that. PMH alcoholic hepatitis without ascites, fonseca's esophagus, pancreatitis, hyponatremia, COPD, HTN, seizures, allergic rhinitis, aortic ectasia, SANDOVAL, and depression presents to discuss sleep management and weaning lorazepam weaning. Patient following with nephrology for hyponatremia. Endorses compliance with fluid restriction and Na tab regimen. Denies any other concerns except for racing thoughts at nighttime when he is trying to sleep.       The following portions of the patient's history were reviewed and updated as appropriate: allergies, current medications, past family history, past medical history, past social history, past surgical history and problem list.    Patient Active Problem List   Diagnosis   • Tobacco abuse   • Essential hypertension   • H/O suicide attempt   • H/O cervical spine surgery   • Chronic hyponatremia   • Dietary folate deficiency anemia   • Ventral hernia without obstruction or gangrene   • Hepatomegaly   • Hepatic steatosis   • Hypomagnesemia   • Elevated alkaline phosphatase level   • Alcoholic hepatitis without ascites   • Vitamin D deficiency   • Iron deficiency   • Urinary retention   • Bladder wall thickening   • Hypophosphatemia   • Generalized weakness   • Malnutrition of moderate degree (HCC)   • Hyponatremia   • Hypokalemia   • Continuous chronic alcoholism (HCC)   • Incisional hernia   • Hx of seizure disorder   • Seizure-like activity (HCC)   • Diarrhea   • Abnormality of pancreatic duct   • Allergic rhinitis   • Microcytic anemia   • Abdominal wound dehiscence   • Cervical disc disorder with myelopathy   • Cervical spinal stenosis   • Depression   • Left foot drop   • Lumbar radiculopathy   • Neuropathy involving both lower extremities   • Peripheral neuropathy   • T6 vertebral fracture (HCC)   • Alcoholic cirrhosis of liver without ascites (HCC)   • Thrombocytopenia (HCC)   • Closed fracture of left olecranon process, initial encounter   • Iron deficiency anemia due to chronic blood loss   • Duodenal ulcer   • Tubular adenoma   • Traore esophagus   • Celiac artery stenosis (HCC)   • Pancreatic mass   • Pancytopenia (HCC)   • Tobacco use   • Constipation   • Transaminitis   • Lesion of spleen   • Left anterior fascicular block   • Abnormal EKG   • Acute on chronic pancreatitis [facility])   • Pancreatic pseudocyst   • COPD (chronic obstructive pulmonary disease) (HCC)   • Ileus (HCC)   • Ambulatory dysfunction   • Current every day smoker   • Fall   • Hx of duodenal ulcer   • Neck pain   • Alcohol use   • Thoracic compression fracture (HCC)   • Aortic ectasia (HCC)   • Rib fractures   • Seizure disorder (HCC)   • Hypoxia   • Traore's esophagus   • Elevated d-dimer   • COVID-19 virus infection   • Hypocalcemia   • Clavicular fracture   • Low serum cortisol level   • Chronic anemia   • Osteoporosis with current pathological fracture   • Abnormal CXR   • Moderate protein-calorie malnutrition (HCC)   • Esophageal abnormality   • Insomnia   • History of alcohol abuse   • Hypoglycemia   • Hypotension   • Closed compression fracture of third lumbar vertebra (HCC)   • Sacral fracture, closed (HCC)   • Coagulopathy (HCC)   • Closed fracture of transverse process of lumbar vertebra (HCC)       Current Outpatient Medications   Medication Sig Dispense Refill   • ALPRAZolam (XANAX) 0.5 mg tablet Take 1 tablet (0.5 mg total) by mouth daily at bedtime as needed for anxiety 30 tablet 0   • doxepin (SINEquan) 25 mg capsule Take 1 capsule (25 mg total) by mouth daily at bedtime 30 capsule 0   • albuterol (Ventolin HFA) 90 mcg/act inhaler Inhale 2 puffs every 6 (six) hours as needed for wheezing 8 g 1   • Cholecalciferol 25 MCG (1000 UT) tablet Take 1 tablet (1,000 Units total) by mouth daily 30 tablet 2   • cyanocobalamin (VITAMIN B-12) 100 mcg tablet Take 1 tablet (100 mcg total) by mouth daily 30 tablet 5   • Diclofenac Sodium (VOLTAREN) 1 % Apply 2 g topically 4 (four) times a day 100 g 1   • docusate sodium (COLACE) 100 mg capsule Take 1 capsule (100 mg total) by mouth 2 (two) times a day as needed for constipation 30 capsule 0   • ergocalciferol (VITAMIN D2) 50,000 units Take 1 capsule (50,000 Units total) by mouth once a week 13 capsule 1   • folic acid (FOLVITE) 1 mg tablet Take 1 tablet (1 mg total) by mouth daily 30 tablet 2   • hydrOXYzine HCL (ATARAX) 50 mg tablet      • levETIRAcetam (KEPPRA) 1000 MG tablet TAKE 1 TABLET BY MOUTH EVERY 12 HOURS 60 tablet 0   • lisinopril (ZESTRIL) 2.5 mg tablet Take 1 tablet (2.5 mg total) by mouth daily 90 tablet 2   • LORazepam (ATIVAN) 1 mg tablet Take 1 tablet (1 mg total) by mouth daily at bedtime 30 tablet 0   • midodrine (PROAMATINE) 5 mg tablet Take 1 tablet (5 mg total) by mouth 3 (three) times a day before meals (Patient not taking: Reported on 7/5/2023) 90 tablet 0   • Multiple Vitamin (TAB-A-BONI PO) Take 1 tablet by mouth daily     • multivitamin (THERAGRAN) TABS Take 1 tablet by mouth daily     • naproxen (Naprosyn) 500 mg tablet Take 1 tablet (500 mg total) by mouth 2 (two) times a day with meals 30 tablet 0   • omeprazole (PriLOSEC) 20 mg delayed release capsule Take 1 capsule (20 mg total) by mouth daily 30 capsule 11   • polyethylene glycol-electrolytes (TriLyte) 4000 mL solution Take 4,000 mL by mouth once for 1 dose Take 4000 mL by mouth once for 1 dose. Use as directed 4000 mL 0   • sodium chloride 1 g tablet Take 2 tablets (2 g total) by mouth 3 (three) times a day with meals 240 tablet 3   • thiamine (VITAMIN B1) 100 mg tablet Take 100 mg by mouth daily     • thiamine 100 MG tablet Take 1 tablet (100 mg total) by mouth daily 30 tablet 2     No current facility-administered medications for this visit.         Past Medical History:   Diagnosis Date   • Alcohol abuse    • Traore esophagus    • Bowel obstruction (HCC)    • Bowel perforation (HCC)    • Cardiac disease    • Continuous chronic alcoholism ([address]) 10/5/2017   • COPD (chronic obstructive pulmonary disease) (HCC)    • History of shoulder surgery     Right shoulder   • History of transfusion    • Hx of cervical spine surgery    • Hypertension    • Incisional hernia 10/8/2017   • MI, old    • Mitral regurgitation    • Psychiatric disorder    • Seizures ([address])         Past Surgical History:   Procedure Laterality Date   • APPENDECTOMY     • BACK SURGERY     • CHOLECYSTECTOMY     • ESOPHAGOGASTRODUODENOSCOPY N/A 11/28/2016    Procedure: ESOPHAGOGASTRODUODENOSCOPY (EGD); Surgeon: Rose Whitlock MD;  Location: [facility]; Service:    • GALLBLADDER SURGERY     • LAPAROTOMY N/A 10/25/2016    Procedure: LAPAROTOMY EXPLORATORY;  Surgeon: Halima Cooper MD;  Location: [facility];  Service:    • MOUTH SURGERY     • PERCUTANEOUS PINNING FEMORAL NECK FRACTURE     • SHOULDER SURGERY Right    • SHOULDER SURGERY     • SMALL INTESTINE SURGERY     • STOMACH SURGERY      bal surgery        Social History     Socioeconomic History   • Marital status: Single     Spouse name: Not on file   • Number of children: Not on file   • Years of education: Not on file   • Highest education level: Not on file   Occupational History   • Not on file   Tobacco Use   • Smoking status: Every Day     Packs/day: 3.00     Years: 35.00     Total pack years: 105.00     Types: Cigarettes   • Smokeless tobacco: Never   Vaping Use   • Vaping Use: Former   Substance and Sexual Activity   • Alcohol use: Yes     Alcohol/week: 42.0 standard drinks of alcohol     Types: 42 Cans of beer per week     Comment: a six pack a day   • Drug use: No   • Sexual activity: Not Currently     Partners: Female   Other Topics Concern   • Not on file   Social History Narrative   • Not on file     Social Determinants of Health     Financial Resource Strain: Not on file   Food Insecurity: No Food Insecurity (2/27/2023)    Hunger Vital Sign    • Worried About Running Out of Food in the Last Year: Never true    • Ran Out of Food in the Last Year: Never true   Transportation Needs: No Transportation Needs (2/27/2023)    PRAPARE - Transportation    • Lack of Transportation (Medical): No    • Lack of Transportation (Non-Medical):  No   Physical Activity: Not on file   Stress: Not on file   Social Connections: Not on file   Intimate Partner Violence: Not on file   Housing Stability: Low Risk  (2/27/2023)    Housing Stability Vital Sign    • Unable to Pay for Housing in the Last Year: No    • Number of Places Lived in the Last Year: 1    • Unstable Housing in the Last Year: No      .  Review of Systems   Constitutional: Negative for activity change, appetite change, fatigue and unexpected weight change. HENT: Negative for congestion, ear pain, hearing loss, nosebleeds, rhinorrhea, sinus pain and trouble swallowing. Eyes: Negative for photophobia. Respiratory: Negative for choking, shortness of breath and wheezing. Cardiovascular: Negative for chest pain, palpitations and leg swelling. Gastrointestinal: Negative for abdominal pain, blood in stool, constipation, diarrhea and nausea. Endocrine: Negative for polydipsia, polyphagia and polyuria. Genitourinary: Negative for difficulty urinating, dysuria, frequency, hematuria and urgency. Musculoskeletal: Positive for arthralgias and back pain (chronic; following with neurosurgery). Negative for myalgias. Skin: Negative for color change, rash and wound. Neurological: Negative for dizziness, tremors, syncope, weakness and headaches. Psychiatric/Behavioral: Positive for sleep disturbance. Negative for agitation, confusion, self-injury and suicidal ideas. The patient is nervous/anxious. Objective:      /84   Pulse 86   Temp (!) 97.4 °F (36.3 °C)   Resp 18   Ht 5' 9" (1.753 m)   Wt 58.5 kg (129 lb)   SpO2 93%   BMI 19.05 kg/m²          Physical Exam  Vitals and nursing note reviewed. Constitutional:       Appearance: Normal appearance. HENT:      Nose: Nose normal.      Mouth/Throat:      Mouth: Mucous membranes are moist.   Eyes:      Conjunctiva/sclera: Conjunctivae normal.   Cardiovascular:      Rate and Rhythm: Normal rate and regular rhythm. Pulmonary:      Effort: Pulmonary effort is normal.      Breath sounds: Normal breath sounds. Abdominal:      General: Abdomen is flat. Bowel sounds are normal.      Palpations: Abdomen is soft. Genitourinary:     Comments: Exam deferred  Skin:     General: Skin is warm and dry. Capillary Refill: Capillary refill takes less than 2 seconds. Neurological:      General: No focal deficit present. Mental Status: He is alert and oriented to person, place, and time. Psychiatric:         Mood and Affect: Mood is anxious and depressed. Behavior: Behavior normal.         Thought Content: Thought content does not include homicidal or suicidal ideation.

## 2023-07-05 NOTE — PROGRESS NOTES
Outpatient Care Management Note:  Follow up call placed to [facility]. He has been doing well and going to needed appointments. He had an appointment with his PCP this morning. They are tapering off his Xanax and starting him on Doxepin for his insomnia. [facility] is aware that Nephrology would like him to have lab work done ASAP and schedule an appointment with there office. [facility] states he will call tomorrow morning to schedule and will have lab work done as soon as he is able. [facility] states that he has ne needs. No need for further outreach at this time. Closing CM surveillance episode.

## 2023-07-05 NOTE — TELEPHONE ENCOUNTER
Left voicemail for pt to complete lab work as soon as possible, and to call back to schedule follow up

## 2023-07-07 NOTE — TELEPHONE ENCOUNTER
Please let [facility] know his sodium looks good on most recent lab work.  It is 131 previously 125

## 2023-07-11 ENCOUNTER — OFFICE VISIT (OUTPATIENT)
Dept: NEUROSURGERY | Facility: CLINIC | Age: 58
End: 2023-07-11
Payer: COMMERCIAL

## 2023-07-11 VITALS
WEIGHT: 129 LBS | RESPIRATION RATE: 97 BRPM | HEART RATE: 80 BPM | DIASTOLIC BLOOD PRESSURE: 84 MMHG | BODY MASS INDEX: 19.11 KG/M2 | HEIGHT: 69 IN | TEMPERATURE: 98.2 F | SYSTOLIC BLOOD PRESSURE: 124 MMHG

## 2023-07-11 DIAGNOSIS — Z98.890 H/O CERVICAL SPINE SURGERY: Primary | ICD-10-CM

## 2023-07-11 DIAGNOSIS — S32.039A L3 VERTEBRAL FRACTURE (HCC): ICD-10-CM

## 2023-07-11 DIAGNOSIS — M47.12 OTHER SPONDYLOSIS WITH MYELOPATHY, CERVICAL REGION: ICD-10-CM

## 2023-07-11 DIAGNOSIS — T84.216A HARDWARE FAILURE OF ANTERIOR COLUMN OF SPINE (HCC): ICD-10-CM

## 2023-07-11 DIAGNOSIS — S22.009A MULT FRACTURES OF THORACIC SPINE, CLOSED (HCC): ICD-10-CM

## 2023-07-11 PROCEDURE — 99205 OFFICE O/P NEW HI 60 MIN: CPT | Performed by: NURSE PRACTITIONER

## 2023-07-11 NOTE — PROGRESS NOTES
Neurosurgery Office Note  Sherif Mensah 63 y.o. 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 Daria Prescott for spinal deformity/hardware malfunction  · Extensive past traumatic history including fall onto forehead with multiple cervical fractures requiring C4-T1 PCDF with Dr. Kendall Krabbe at [facility] in 2013. · S/p C5-C7 ACDF in 2014 again with Dr. Kendall Krabbe. · Initially was paralyzed but regained most function through aquatherapy. · Over the years developed loss of stature, chronic back pain and eventually dropped head syndrome. · S/p fall one year ago with multiple thoracic and lumbar fractures. · Has been sober x 14 months. · On current exam, noted with severe kyphosis at cervico-thoracic junction. C7-T1 hardware palpable through skin. RUE strength 4/5. Chronic peripheral neuropathy to hands and feet. Imaging:  · MRI cervical spine wo, 6/4/2023: Postoperative changes and multilevel cervical spondylosis, as described above. Interval development of cervical spondylosis at C2-C3 and C3-C4 since MRI from 8/12/2014 as described above. No intrinsic cord signal abnormality seen. · Scoliosis series, 6/7/2023: No significant scoliotic curvature. Multilevel thoracolumbar compression deformities appear similar to prior CT. Stable accentuation of thoracic kyphosis. Sacral sufficiency fracture again noted. Lower cervical fusion hardware. Fractured T1 pedicle screw. · Cervical x-rays, 6/7/2023: Stable postoperative change after C4-T1 posterior fusion and ACDF at C5-T1 with redemonstration of a fractured right T1 pedicle screw. Degenerative change noted without dynamic instability. · Lumbar x-rays, 6/2/2023: Mild L3 compression deformity. Correlate with clinical exam. Degenerative changes as described. Plan:  · Reviewed imaging extensively with patient.   · Discussed that revision and deformity correction would be extremely involved and complicated and would require extensive recovery. · He is willing to quit smoking preoperatively. · Reviewed that we have concerns regarding bone quality in setting of multiple fractures and treatment with Reclast.  · Will refer to Dr. Susannah Puga for further evaluation for consideration for deformity correction. · Follow up with pain management as previously referred. 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 ([address])  -     Ambulatory Referral to Neurosurgery    Other spondylosis with myelopathy, cervical region    Mult fractures of thoracic spine, closed ([address])    L3 vertebral fracture ([address])          I have spent a total time of 45 minutes on 07/11/23 in caring for this patient including Diagnostic results, Risks and benefits of tx options, Patient and family education, Risk factor reductions, Impressions, Counseling / Coordination of care, Documenting in the medical record, Reviewing / ordering tests, medicine, procedures  , Obtaining or reviewing history   and Communicating with other healthcare professionals . CHIEF COMPLAINT    Chief Complaint   Patient presents with   • Consult       HISTORY    History of Present Illness     63y.o. year old male     With past medical history of tobacco use (1 ppd), COPD, seizure disorder, alcoholic hepatitis, pancreatitis, HTN, who presents as referral from Dr. Nicol Armstrong (orthopedics) for evaluation of cervical thoracic kyphotic deformity in setting of previous anterior and posterior cervical fusion. He states that about 10 years ago in 2013 he sustained a trip and fall smashing his forehead on the floor and breaking several cervical vertebrae. He underwent emergent C4-T1 posterior decompression and fusion with Dr. Brenda Grant at [facility] in [facility]. His recovery was difficult and he states he was paralyzed for about 2 years following surgery.   Additionally about a year later he required anterior cervical fusion from C5-C7. He states he worked very hard with aqua therapy and eventually was able to regain most of his functioning. He has been on disability ever since. Last year he sustained a fall secondary to alcohol use and broke several bones in his thoracic and lumbar spine. This served as a wake-up call and he has not touched alcohol in 14 months. He has reduced his cigarette use from 4 packs a day to 1 pack/day. He states that since his fall last year he has been experiencing significant pain from his neck all the way midline down to his sacrum. He takes naproxen for his pain but it often irritates his stomach. He describes chronic numbness and tingling to his hands up to his elbows and his feet ever since his original surgery. He states he used to be 5 foot 9 and is now 5 foot 4 secondary to his curve in his spine and inability to lift his head up. He is very interested in surgery to correct this deformity. Currently he lives at home with his older brother. See Discussion    REVIEW OF SYSTEMS    Review of Systems   Constitutional: Negative. HENT: Negative. Eyes: Negative. Respiratory: Negative. H/o COPD, smoker  1 pk/day   Cardiovascular: Negative. Gastrointestinal: Negative. Endocrine: Negative. Genitourinary: Negative. Musculoskeletal: Positive for back pain (radiates to b/l posterior legs and stops behind b/ l knees.), myalgias (b/l shoulders and lower back. ), neck pain (Neck pain radiates to b/L arms/hands x 10yrs. ) and neck stiffness (decrease ROM, difficulty looking up.). His back pain is constant, sharp, shooting pain and he rates it a 7/10 today. His pain is worse in the evening and is aggravated by bending and prolonged standing /walking. Also has difficulty walking and he can't pick his head up when walking. .     PT in 2022  Helped   Pain Man --NONE  MRI MARSINE 6/4/23 RIGO             C/S  XR AND L/S 6/2/23 SL  ZELDA SX AND LSPINE SX 7-10 YEARS AGO AT [facility] POSSIBLY    Skin: Negative. Allergic/Immunologic: Negative. Neurological: Positive for tremors (has RLS), seizures (on Keppra, last episode in May 2022), weakness (b/l legs), numbness and headaches. Hematological: Negative. Psychiatric/Behavioral: Positive for sleep disturbance (due to pain). All other systems reviewed and are negative. ROS obtained by MA. Reviewed. See HPI.      Meds/Allergies     Current Outpatient Medications   Medication Sig Dispense Refill   • albuterol (Ventolin HFA) 90 mcg/act inhaler Inhale 2 puffs every 6 (six) hours as needed for wheezing 8 g 1   • ALPRAZolam (XANAX) 0.5 mg tablet Take 1 tablet (0.5 mg total) by mouth daily at bedtime as needed for anxiety 30 tablet 0   • Cholecalciferol 25 MCG (1000 UT) tablet Take 1 tablet (1,000 Units total) by mouth daily 30 tablet 2   • cyanocobalamin (VITAMIN B-12) 100 mcg tablet Take 1 tablet (100 mcg total) by mouth daily 30 tablet 5   • Diclofenac Sodium (VOLTAREN) 1 % Apply 2 g topically 4 (four) times a day 100 g 1   • docusate sodium (COLACE) 100 mg capsule Take 1 capsule (100 mg total) by mouth 2 (two) times a day as needed for constipation 30 capsule 0   • doxepin (SINEquan) 25 mg capsule Take 1 capsule (25 mg total) by mouth daily at bedtime 30 capsule 0   • ergocalciferol (VITAMIN D2) 50,000 units Take 1 capsule (50,000 Units total) by mouth once a week 13 capsule 1   • folic acid (FOLVITE) 1 mg tablet Take 1 tablet (1 mg total) by mouth daily 30 tablet 2   • hydrOXYzine HCL (ATARAX) 50 mg tablet      • levETIRAcetam (KEPPRA) 1000 MG tablet TAKE 1 TABLET BY MOUTH EVERY 12 HOURS 60 tablet 0   • lisinopril (ZESTRIL) 2.5 mg tablet Take 1 tablet (2.5 mg total) by mouth daily 90 tablet 2   • LORazepam (ATIVAN) 1 mg tablet Take 1 tablet (1 mg total) by mouth daily at bedtime 30 tablet 0   • Multiple Vitamin (TAB-A-BONI PO) Take 1 tablet by mouth daily     • naproxen (Naprosyn) 500 mg tablet Take 1 tablet (500 mg total) by mouth 2 (two) times a day with meals 30 tablet 0   • sodium chloride 1 g tablet Take 2 tablets (2 g total) by mouth 3 (three) times a day with meals 240 tablet 3   • thiamine (VITAMIN B1) 100 mg tablet Take 100 mg by mouth daily     • midodrine (PROAMATINE) 5 mg tablet Take 1 tablet (5 mg total) by mouth 3 (three) times a day before meals (Patient not taking: Reported on 7/5/2023) 90 tablet 0   • multivitamin (THERAGRAN) TABS Take 1 tablet by mouth daily (Patient not taking: Reported on 7/11/2023)     • omeprazole (PriLOSEC) 20 mg delayed release capsule Take 1 capsule (20 mg total) by mouth daily 30 capsule 11   • polyethylene glycol-electrolytes (TriLyte) 4000 mL solution Take 4,000 mL by mouth once for 1 dose Take 4000 mL by mouth once for 1 dose. Use as directed 4000 mL 0     No current facility-administered medications for this visit. No Known Allergies    PAST HISTORY    Past Medical History:   Diagnosis Date   • Alcohol abuse    • Traore esophagus    • Bowel obstruction (HCC)    • Bowel perforation (HCC)    • Cardiac disease    • Continuous chronic alcoholism ([address]) 10/5/2017   • COPD (chronic obstructive pulmonary disease) (HCC)    • History of shoulder surgery     Right shoulder   • History of transfusion    • Hx of cervical spine surgery    • Hypertension    • Incisional hernia 10/8/2017   • MI, old    • Mitral regurgitation    • Psychiatric disorder    • Seizures ([address])        Past Surgical History:   Procedure Laterality Date   • APPENDECTOMY     • BACK SURGERY     • CHOLECYSTECTOMY     • ESOPHAGOGASTRODUODENOSCOPY N/A 11/28/2016    Procedure: ESOPHAGOGASTRODUODENOSCOPY (EGD); Surgeon: Marely Stewart MD;  Location: [facility];   Service:    • GALLBLADDER SURGERY     • LAPAROTOMY N/A 10/25/2016    Procedure: LAPAROTOMY EXPLORATORY;  Surgeon: Anna Frias MD;  Location: [facility];  Service:    • MOUTH SURGERY     • PERCUTANEOUS PINNING FEMORAL NECK FRACTURE     • SHOULDER SURGERY Right    • SHOULDER SURGERY     • SMALL INTESTINE SURGERY     • STOMACH SURGERY      bal surgery       Social History     Tobacco Use   • Smoking status: Every Day     Packs/day: 3.00     Years: 35.00     Total pack years: 105.00     Types: Cigarettes   • Smokeless tobacco: Never   Vaping Use   • Vaping Use: Former   Substance Use Topics   • Alcohol use: Yes     Alcohol/week: 42.0 standard drinks of alcohol     Types: 42 Cans of beer per week     Comment: a six pack a day   • Drug use: No       Family History   Problem Relation Age of Onset   • Breast cancer Mother    • Prostate cancer Father    • Skin cancer Brother          Above history personally reviewed. EXAM    Vitals:Blood pressure 124/84, pulse 80, temperature 98.2 °F (36.8 °C), temperature source Temporal, resp. rate (!) 97, height 5' 9" (1.753 m), weight 58.5 kg (129 lb). ,Body mass index is 19.05 kg/m². Physical Exam  Constitutional:       General: He is not in acute distress. Appearance: He is well-developed. He is not diaphoretic. Eyes:      General:         Right eye: No discharge. Left eye: No discharge. Extraocular Movements: EOM normal.      Conjunctiva/sclera: Conjunctivae normal.      Pupils: Pupils are equal, round, and reactive to light. Neck:      Comments: Severe kyphosis. Dropped head syndrome. Cervico-thoracic incision healed. Hardware palpable under skin and blanching. Pulmonary:      Effort: Pulmonary effort is normal. No respiratory distress. Abdominal:      General: Bowel sounds are normal. There is no distension. Palpations: Abdomen is soft. Tenderness: There is no abdominal tenderness. Musculoskeletal:         General: Normal range of motion. Skin:     General: Skin is warm and dry. Neurological:      Mental Status: He is alert and oriented to person, place, and time. Cranial Nerves: No cranial nerve deficit.       Sensory: Sensory deficit present. Motor: Weakness present. Coordination: Coordination normal. Finger-Nose-Finger Test normal.      Deep Tendon Reflexes: Reflexes normal.      Reflex Scores:       Tricep reflexes are 1+ on the right side and 1+ on the left side. Bicep reflexes are 1+ on the right side and 1+ on the left side. Brachioradialis reflexes are 1+ on the right side and 1+ on the left side. Patellar reflexes are 1+ on the right side and 1+ on the left side. Achilles reflexes are 1+ on the right side and 1+ on the left side. Psychiatric:         Speech: Speech normal.         Behavior: Behavior normal.         Thought Content: Thought content normal.         Judgment: Judgment normal.         Neurologic Exam     Mental Status   Oriented to person, place, and time. Oriented to person. Oriented to place. Oriented to time. Oriented to year, month and date. Registration: recalls 3 of 3 objects. Attention: normal. Concentration: normal.   Speech: speech is normal   Level of consciousness: alert  Knowledge: good and consistent with education. Able to name object. Cranial Nerves     CN III, IV, VI   Pupils are equal, round, and reactive to light. Extraocular motions are normal.   Right pupil: Size: 3 mm. Shape: regular. Reactivity: brisk. Consensual response: intact. Accommodation: intact. Left pupil: Size: 3 mm. Shape: regular. Reactivity: brisk. Consensual response: intact. Accommodation: intact. Nystagmus: none   Diplopia: none  Conjugate gaze: present    CN V   Right facial sensation deficit: none  Left facial sensation deficit: none    CN VII   Facial expression full, symmetric.      CN VIII   Hearing: intact    CN IX, X   Palate: symmetric    CN XI   Right sternocleidomastoid strength: normal  Left sternocleidomastoid strength: normal  Right trapezius strength: normal  Left trapezius strength: normal    CN XII   Tongue: not atrophic  Fasciculations: absent  Tongue deviation: none    Motor Exam   Muscle bulk: normal  Overall muscle tone: normal  Right arm pronator drift: absent  Left arm pronator drift: absent    Strength   Right deltoid: 4/5  Left deltoid: 5/5  Right biceps: 4/5  Left biceps: 5/5  Right triceps: 4/5  Left triceps: 5/5  Right wrist flexion: 4/5  Left wrist flexion: 5/5  Right wrist extension: 4/5  Left wrist extension: 5/5  Right interossei: 4/5  Left interossei: 5/5  Right iliopsoas: 5/5  Left iliopsoas: 5/5  Right quadriceps: 5/5  Left quadriceps: 5/5  Right hamstrin/5  Left hamstrin/5  Right glutei: 5/5  Left glutei: 5/5  Right anterior tibial: 5/5  Left anterior tibial: 5/5  Right posterior tibial: 5/5  Left posterior tibial: 5/5  Right peroneal: 5/5  Left peroneal: 5/5  Right gastroc: 5/5  Left gastroc: 5/5    Sensory Exam   Proprioception normal.   Bilateral hands and feet numbness/neuropathy     Gait, Coordination, and Reflexes     Coordination   Finger to nose coordination: normal    Tremor   Resting tremor: absent  Intention tremor: absent  Action tremor: absent    Reflexes   Right brachioradialis: 1+  Left brachioradialis: 1+  Right biceps: 1+  Left biceps: 1+  Right triceps: 1+  Left triceps: 1+  Right patellar: 1+  Left patellar: 1+  Right achilles: 1+  Left achilles: 1+  Right : 1+  Left : 1+  Right Turner: absent  Left Turner: absent  Right ankle clonus: absent  Left ankle clonus: absent        MEDICAL DECISION MAKING    Imaging Studies:     No results found. I have personally reviewed pertinent reports.    and I have personally reviewed pertinent films in PACS

## 2023-07-11 NOTE — ASSESSMENT & PLAN NOTE
Presents as referral from Dr. Ilene Butterfield for spinal deformity/hardware malfunction  · Extensive past traumatic history including fall onto forehead with multiple cervical fractures requiring C4-T1 PCDF with Dr. Saadia Neal at [facility] in 2013. · S/p C5-C7 ACDF in 2014 again with Dr. Saadia Neal. · Initially was paralyzed but regained most function through aquatherapy. · Over the years developed loss of stature, chronic back pain and eventually dropped head syndrome. · S/p fall one year ago with multiple thoracic and lumbar fractures. · Has been sober x 14 months. · On current exam, noted with severe kyphosis at cervico-thoracic junction. C7-T1 hardware palpable through skin. RUE strength 4/5. Chronic peripheral neuropathy to hands and feet. Imaging:  · MRI cervical spine wo, 6/4/2023: Postoperative changes and multilevel cervical spondylosis, as described above. Interval development of cervical spondylosis at C2-C3 and C3-C4 since MRI from 8/12/2014 as described above. No intrinsic cord signal abnormality seen. · Scoliosis series, 6/7/2023: No significant scoliotic curvature. Multilevel thoracolumbar compression deformities appear similar to prior CT. Stable accentuation of thoracic kyphosis. Sacral sufficiency fracture again noted. Lower cervical fusion hardware. Fractured T1 pedicle screw. · Cervical x-rays, 6/7/2023: Stable postoperative change after C4-T1 posterior fusion and ACDF at C5-T1 with redemonstration of a fractured right T1 pedicle screw. Degenerative change noted without dynamic instability. · Lumbar x-rays, 6/2/2023: Mild L3 compression deformity. Correlate with clinical exam. Degenerative changes as described. Plan:  · Reviewed imaging extensively with patient. · Discussed that revision and deformity correction would be extremely involved and complicated and would require extensive recovery. · He is willing to quit smoking preoperatively.   · Reviewed that we have concerns regarding bone quality in setting of multiple fractures and treatment with Reclast.  · Will refer to Dr. Jean Carlos Poe for further evaluation for consideration for deformity correction. · Follow up with pain management as previously referred.

## 2023-07-20 ENCOUNTER — OFFICE VISIT (OUTPATIENT)
Dept: GASTROENTEROLOGY | Facility: CLINIC | Age: 58
End: 2023-07-20

## 2023-07-20 VITALS
HEART RATE: 80 BPM | HEIGHT: 69 IN | SYSTOLIC BLOOD PRESSURE: 144 MMHG | BODY MASS INDEX: 18.66 KG/M2 | DIASTOLIC BLOOD PRESSURE: 92 MMHG | TEMPERATURE: 98.8 F | WEIGHT: 126 LBS

## 2023-07-20 DIAGNOSIS — K22.719 BARRETT'S ESOPHAGUS WITH DYSPLASIA: ICD-10-CM

## 2023-07-20 DIAGNOSIS — K22.9 ESOPHAGEAL ABNORMALITY: ICD-10-CM

## 2023-07-20 DIAGNOSIS — R16.0 HEPATOMEGALY: Primary | ICD-10-CM

## 2023-07-20 DIAGNOSIS — K21.00 GASTROESOPHAGEAL REFLUX DISEASE WITH ESOPHAGITIS WITHOUT HEMORRHAGE: ICD-10-CM

## 2023-07-20 DIAGNOSIS — K70.30 ALCOHOLIC CIRRHOSIS OF LIVER WITHOUT ASCITES (HCC): ICD-10-CM

## 2023-07-20 DIAGNOSIS — K26.9 DUODENAL ULCER: ICD-10-CM

## 2023-07-20 DIAGNOSIS — Z72.0 TOBACCO ABUSE: ICD-10-CM

## 2023-07-20 DIAGNOSIS — K70.10 ALCOHOLIC HEPATITIS WITHOUT ASCITES: ICD-10-CM

## 2023-07-20 DIAGNOSIS — D36.9 TUBULAR ADENOMA: ICD-10-CM

## 2023-07-20 NOTE — PROGRESS NOTES
[facility] - Outpatient Follow-up Note  Oneyda Jeffery 63 y.o. male MRN: [id number]  Encounter: [id number]          ASSESSMENT AND PLAN:    The patient presents today for follow-up office visit regarding his chronic GERD with duodenal ulcer, history of Barretts, esophageal abnormality, hepatomegaly, alcoholic hepatitis/cirrhosis of the liver without ascites. Exam: Abdomen is soft, flat, non-tender with normal BS x 4. No edema noted of the b/l lower extremities noted upon exam today. Skin is non-icteric. 1. Hepatomegaly  2. Alcoholic hepatitis without ascites  3. Alcoholic cirrhosis of liver without ascites ([address])  While the patient was in the office today, at this point with his most recent imaging showing a Medivair Score of F4:Cirrhosis. He has been alcohol free from the past 15 months without any relapse and is working on smoking cessation as he is down from 4 PPD to 8 cigarettes daily and continues to work on quitting. He is currently scheduled for a colonoscopy and EGD on 8/29/23 with Dr. Cherri Snider. However, he may have to re-schedule for more emergent cervical spine fusion revision surgery. He will contact our office as soon as he knows when his cervical spine surgery is going to be with Dr. Diamond Najjar. Continue to abstain from alcohol. Continue to work on smoking cessation. Proceed with EGD and Colonoscopy as scheduled. 4. Gastroesophageal reflux disease with esophagitis without hemorrhage  5. Duodenal ulcer  6. Traore's esophagus with dysplasia  7. Esophageal abnormality  8. Tobacco abuse    Continue to abstain from alcohol. Continue to work on smoking cessation. Proceed with EGD and Colonoscopy as scheduled. 9. Tubular adenoma  Proceed with colonoscopy as scheduled. Contac our office with any changes or red flag symptoms.      While the patient was in the office today we did review GI red flag symptoms, including, but not limited to: chronic nausea, vomiting, diarrhea, chills, fever, and unintentional weight loss and should call or contact our office with any changes or concerns. I reviewed with the patient that if they notice any blood while vomiting or in their stool they should contact or office or go to the nearest emergency room for immediate evaluation. The patient was agreeable and verbalized an understanding. The patient will schedule a follow up office visit in 12-16 weeks, but understands to call or contact our office if there are any issues or concerns in the mean time. ______________________________________________________________________    SUBJECTIVE: Patient is a 63 y.o. male who was previously seen in our office on 6/15/23 by KEVAN Gallagher PA-C and presents today for follow-up office visit regarding his chronic GERD with duodenal ulcer, history of Barretts, esophageal abnormality, hepatomegaly, alcoholic hepatitis/cirrhosis of the liver without ascites. At this point he is scheduled for his EGD/EUS and colonoscopy on 8/29/23 in [facility] with Dr Cynthia England as he has recently seen one of our hepatologists, Dr Rohit Flor on 6/15/23, who wants him to move forward with the scheduled procedures and follow up in December. However, the issue is that the patient is scheduled for a consultation with neurosurgery, Dr. Estrella Sheets, on 7/25/23 to discuss the possibility of emergent cervical spine fusion revision, so his procedures may have to be postponed and re-scheduled. He is going to call our office once he sees Neurosurgery next week. The patient reports that currently he is not experiencing any reflux symptoms and feels that the omeprazole is providing definite improvement and relief of his reflux symptoms. He reports that he is have regular BM's without any issues, constipation, straining or bleeding. Meds: Omeprazole 20 mg QD. Tobacco: Working on quitting, down from 4 PPD to 8 ciggs per day. ETOH: Denied. Sober for 15 months. Marijuana: Denied  Illicit Drug Use: Deneid    Endoscopy History: EGD: (2020): Scheduled for repeat: 8/29/23     COLONOSCOPY: (2020): Scheduled for repeat: 8/29/23       REVIEW OF SYSTEMS IS OTHERWISE NEGATIVE. Historical Information   Past Medical History:   Diagnosis Date   • Alcohol abuse    • Traore esophagus    • Bowel obstruction (HCC)    • Bowel perforation (HCC)    • Cardiac disease    • Continuous chronic alcoholism ([address]) 10/5/2017   • COPD (chronic obstructive pulmonary disease) (HCC)    • History of shoulder surgery     Right shoulder   • History of transfusion    • Hx of cervical spine surgery    • Hypertension    • Incisional hernia 10/8/2017   • MI, old    • Mitral regurgitation    • Psychiatric disorder    • Seizures ([address])      Past Surgical History:   Procedure Laterality Date   • APPENDECTOMY     • BACK SURGERY     • CHOLECYSTECTOMY     • ESOPHAGOGASTRODUODENOSCOPY N/A 11/28/2016    Procedure: ESOPHAGOGASTRODUODENOSCOPY (EGD); Surgeon: Judit Hardy MD;  Location: [facility];   Service:    • GALLBLADDER SURGERY     • LAPAROTOMY N/A 10/25/2016    Procedure: LAPAROTOMY EXPLORATORY;  Surgeon: Anne Marie Chun MD;  Location: [facility];  Service:    • MOUTH SURGERY     • PERCUTANEOUS PINNING FEMORAL NECK FRACTURE     • SHOULDER SURGERY Right    • SHOULDER SURGERY     • SMALL INTESTINE SURGERY     • STOMACH SURGERY      bal surgery     Social History   Social History     Substance and Sexual Activity   Alcohol Use Yes   • Alcohol/week: 42.0 standard drinks of alcohol   • Types: 42 Cans of beer per week    Comment: a six pack a day     Social History     Substance and Sexual Activity   Drug Use No     Social History     Tobacco Use   Smoking Status Every Day   • Packs/day: 3.00   • Years: 35.00   • Total pack years: 105.00   • Types: Cigarettes   Smokeless Tobacco Never     Family History   Problem Relation Age of Onset   • Breast cancer Mother    • Prostate cancer Father    • Skin cancer Brother        Meds/Allergies       Current Outpatient Medications:   •  albuterol (Ventolin HFA) 90 mcg/act inhaler  •  ALPRAZolam (XANAX) 0.5 mg tablet  •  Cholecalciferol 25 MCG (1000 UT) tablet  •  cyanocobalamin (VITAMIN B-12) 100 mcg tablet  •  Diclofenac Sodium (VOLTAREN) 1 %  •  docusate sodium (COLACE) 100 mg capsule  •  doxepin (SINEquan) 25 mg capsule  •  ergocalciferol (VITAMIN D2) 82,279 units  •  folic acid (FOLVITE) 1 mg tablet  •  hydrOXYzine HCL (ATARAX) 50 mg tablet  •  levETIRAcetam (KEPPRA) 1000 MG tablet  •  lisinopril (ZESTRIL) 2.5 mg tablet  •  LORazepam (ATIVAN) 1 mg tablet  •  midodrine (PROAMATINE) 5 mg tablet  •  Multiple Vitamin (TAB-A-BONI PO)  •  naproxen (Naprosyn) 500 mg tablet  •  omeprazole (PriLOSEC) 20 mg delayed release capsule  •  polyethylene glycol-electrolytes (TriLyte) 4000 mL solution  •  sodium chloride 1 g tablet  •  thiamine (VITAMIN B1) 100 mg tablet    No Known Allergies        Objective     Blood pressure 144/92, pulse 80, temperature 98.8 °F (37.1 °C), height 5' 9" (1.753 m), weight 57.2 kg (126 lb). Body mass index is 18.61 kg/m². PHYSICAL EXAM:      General Appearance:   Alert, cooperative, no distress   HEENT:   Normocephalic, atraumatic, anicteric. Neck:  Supple, symmetrical, trachea midline   Lungs:   Clear to auscultation bilaterally; no rales, rhonchi or wheezing; respirations unlabored    Heart[de-identified]   Regular rate and rhythm; no murmur, rub, or gallop. Abdomen:   Soft, non-tender, non-distended; normal bowel sounds; no masses, no organomegaly    Genitalia:   Deferred    Rectal:   Deferred    Extremities:  No cyanosis, clubbing or edema    Pulses:  2+ and symmetric    Skin:  No jaundice, rashes, or lesions    Lymph nodes:  No palpable cervical lymphadenopathy        Lab Results:   No visits with results within 1 Day(s) from this visit.    Latest known visit with results is:   Orders Only on 07/05/2023   Component Date Value   • WBC 07/05/2023 7.76    • RBC 07/05/2023 4.30    • Hemoglobin 07/05/2023 13.4    • Hematocrit 07/05/2023 40.5    • MCV 07/05/2023 94    • MCH 07/05/2023 31.2    • MCHC 07/05/2023 33.1    • RDW 07/05/2023 14.3    • MPV 07/05/2023 11.0    • Platelets 39/76/1641 207    • nRBC 07/05/2023 0    • Neutrophils Relative 07/05/2023 67    • Immat GRANS % 07/05/2023 0    • Lymphocytes Relative 07/05/2023 20    • Monocytes Relative 07/05/2023 10    • Eosinophils Relative 07/05/2023 2    • Basophils Relative 07/05/2023 1    • Neutrophils Absolute 07/05/2023 5.26    • Immature Grans Absolute 07/05/2023 0.02    • Lymphocytes Absolute 07/05/2023 1.51    • Monocytes Absolute 07/05/2023 0.75    • Eosinophils Absolute 07/05/2023 0.17    • Basophils Absolute 07/05/2023 0.05    • Sodium 07/05/2023 131 (L)    • Potassium 07/05/2023 4.2    • Chloride 07/05/2023 100    • CO2 07/05/2023 28    • ANION GAP 07/05/2023 3    • BUN 07/05/2023 10    • Creatinine 07/05/2023 0.75    • Glucose, Fasting 07/05/2023 77    • Calcium 07/05/2023 9.3    • AST 07/05/2023 32    • ALT 07/05/2023 20    • Alkaline Phosphatase 07/05/2023 150 (H)    • Total Protein 07/05/2023 9.2 (H)    • Albumin 07/05/2023 3.8    • Total Bilirubin 07/05/2023 0.33    • eGFR 07/05/2023 102    • PSA 07/05/2023 0.36    • Cholesterol 07/05/2023 153    • Triglycerides 07/05/2023 78    • HDL, Direct 07/05/2023 52    • LDL Calculated 07/05/2023 85          Radiology Results:   No results found.

## 2023-07-20 NOTE — PATIENT INSTRUCTIONS
Continue omeprazole as prescribed. Continue to abstain from alcohol. Continue to work on smoking cessation. Proceed with Neurosurgery OV. Call us if your EGD/Colonoscopy need to be moved. Schedule a follow up office visit in 16 weeks.

## 2023-07-25 ENCOUNTER — OFFICE VISIT (OUTPATIENT)
Dept: NEUROSURGERY | Facility: CLINIC | Age: 58
End: 2023-07-25
Payer: COMMERCIAL

## 2023-07-25 VITALS
DIASTOLIC BLOOD PRESSURE: 82 MMHG | HEART RATE: 85 BPM | OXYGEN SATURATION: 95 % | SYSTOLIC BLOOD PRESSURE: 138 MMHG | BODY MASS INDEX: 18.66 KG/M2 | TEMPERATURE: 98.1 F | WEIGHT: 126 LBS | HEIGHT: 69 IN

## 2023-07-25 DIAGNOSIS — M47.12 OTHER SPONDYLOSIS WITH MYELOPATHY, CERVICAL REGION: ICD-10-CM

## 2023-07-25 DIAGNOSIS — Z98.890 H/O CERVICAL SPINE SURGERY: ICD-10-CM

## 2023-07-25 DIAGNOSIS — S22.009D CLOSED FRACTURE OF MULTIPLE THORACIC VERTEBRAE WITH ROUTINE HEALING, SUBSEQUENT ENCOUNTER: Primary | ICD-10-CM

## 2023-07-25 PROCEDURE — 99213 OFFICE O/P EST LOW 20 MIN: CPT | Performed by: STUDENT IN AN ORGANIZED HEALTH CARE EDUCATION/TRAINING PROGRAM

## 2023-07-25 NOTE — PROGRESS NOTES
.  Office Note - Neurosurgery   Gely Cortez 63 y.o. male MRN: [id number]      Assessment and Plan: This is a 63year old male presenting for surgical evaluation due to low back pain. MRI cervical spine demonstrates post surgical changes with diffuse spondylosis without any overt cord compression. CT CAP from Feb 2023 demonstrate multiple upper thoracic compression fractures with resultant kyphotic deformity with accentuated thoracic kyphosis. Standing scoliosis films demonstrate SVA of 11cm, T6, 7 and 8 fractures with approximately 90 degree thoracic kyphosis, and chin on chest deformity. I had a long discussion with the patient about his symptoms as well as imaging findings. I do not believe the patient is a candidate for surgery. He is an active smoker. In addition, he has severe osteoporosis with a DEXA score of -5.3. He also has many medical comorbidities including COPD. I do not believe he would tolerate an extensive spinal deformity correction from a hemodynamic standpoint. His bone quality would not be able to tolerate surgical hardware required for deformity correction. I advised the patient to continue medical optimization in regards to tobacco cessation, COPD and osteoporosis. I will provide him with a referral to pain management for pain control. I will see him on a PRN basis. History, physical examination and diagnostic tests were reviewed and questions answered. Diagnosis, care plan and treatment options were discussed. The patient understand instructions and will follow up as directed. Follow-up: PRN    I spent approximately 40 minutes with the patient. This time was dedicated to acquiring the patient's history, performing physical examination, reviewing pertinent imaging and discussing the treatment plan.     Diagnoses and all orders for this visit:    Closed fracture of multiple thoracic vertebrae with routine healing, subsequent encounter  -     Ambulatory Referral to Pain Management; Future  -     Ambulatory Referral to Nutrition Services; Future    Other spondylosis with myelopathy, cervical region  -     Ambulatory Referral to Pain Management; Future  -     Ambulatory Referral to Nutrition Services; Future    H/O cervical spine surgery  -     Ambulatory Referral to Pain Management; Future  -     Ambulatory Referral to Nutrition Services; Future        Subjective/Objective     Chief Complaint    Back pain    HPI    This is a 63year old male with history of C4-T1 PCDF in 2013 and subsequent C5-7 ACDF in 2014 presenting for surgical evaluation. The patient's history is well detailed in his clinic visit on 7/11. In brief, the patient reports low back pain. The patient does not radiate into his lower extremities and is unassociated with numbness or tingling. He believe his pain is due to his "hunch back" which causes his head to constantly look down. This creates a lot of issues for him as he is not able to standup straight and has to constantly extend his neck in order to look up. This also creates a lot of low back pain for him. HE was referred to my clinic for surgical evaluation for deformity correction. The patient in the past was a smoker of 4 packs per day and a very heavy drinker. He has COPD and ETOH cirrhosis. ROS  ROS personally reviewed and updated. Review of Systems   Constitutional: Negative. HENT: Negative. Eyes: Negative. Respiratory: Negative. H/o   Smoker 6 cigs /day   Cardiovascular: Negative. Gastrointestinal: Negative. Endocrine: Negative. Genitourinary: Negative. Musculoskeletal: Positive for gait problem, neck pain and neck stiffness. 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 ? Extensive past traumatic history including fall onto forehead with multiple cervical fractures requiring C4-T1 PCDF with Dr. Malcom Cai at [facility] in 2013. S/p C5-C7 ACDF in 2014 again with Dr. Malcom Cai.     Today, patient is c/o neck pain radaites to B/L arms/hands x 10 yrs. + N/T/W on BUE, difficulty grasping and buttons. His pain is a constant, throbbing pain and he rates it an 8/10 today. His pain is worse in the morning and is aggravated by standing/walking ( <20min). Has decrease ROM, worse when turning his head to the left and looking up. Also has weakness and crampingon BLE, mostly at night, unsteady when walking,trouble going goig up/down steps. Meds: Naproxen 500 mg 2x/day, provide Zero  relief  L/S XR 6/2/23 and entire spine   Skin: Negative. Allergic/Immunologic: Negative. Neurological: Positive for seizures (last one 2 yrs ago, on Keppra), weakness and numbness. Hematological: Negative. Psychiatric/Behavioral: Positive for sleep disturbance (due to pain). All other systems reviewed and are negative. Family History    Family History   Problem Relation Age of Onset   • Breast cancer Mother    • Prostate cancer Father    • Skin cancer Brother        Social History    Social History     Socioeconomic History   • Marital status: Single     Spouse name: Not on file   • Number of children: Not on file   • Years of education: Not on file   • Highest education level: Not on file   Occupational History   • Not on file   Tobacco Use   • Smoking status: Every Day     Packs/day: 3.00     Years: 35.00     Total pack years: 105.00     Types: Cigarettes   • Smokeless tobacco: Never   Vaping Use   • Vaping Use: Former   Substance and Sexual Activity   • Alcohol use:  Yes     Alcohol/week: 42.0 standard drinks of alcohol     Types: 42 Cans of beer per week     Comment: a six pack a day   • Drug use: No   • Sexual activity: Not Currently     Partners: Female   Other Topics Concern   • Not on file   Social History Narrative   • Not on file     Social Determinants of Health     Financial Resource Strain: Not on file   Food Insecurity: No Food Insecurity (2/27/2023)    Hunger Vital Sign    • Worried About Running Out of Food in the Last Year: Never true    • Ran Out of Food in the Last Year: Never true   Transportation Needs: No Transportation Needs (2/27/2023)    PRAPARE - Transportation    • Lack of Transportation (Medical): No    • Lack of Transportation (Non-Medical): No   Physical Activity: Not on file   Stress: Not on file   Social Connections: Not on file   Intimate Partner Violence: Not on file   Housing Stability: Low Risk  (2/27/2023)    Housing Stability Vital Sign    • Unable to Pay for Housing in the Last Year: No    • Number of Places Lived in the Last Year: 1    • Unstable Housing in the Last Year: No       Past Medical History    Past Medical History:   Diagnosis Date   • Alcohol abuse    • Traore esophagus    • Bowel obstruction ([address])    • Bowel perforation ([address])    • Cardiac disease    • Continuous chronic alcoholism ([address]) 10/5/2017   • COPD (chronic obstructive pulmonary disease) (HCC)    • History of shoulder surgery     Right shoulder   • History of transfusion    • Hx of cervical spine surgery    • Hypertension    • Incisional hernia 10/8/2017   • MI, old    • Mitral regurgitation    • Psychiatric disorder    • Seizures [facility])        Surgical History    Past Surgical History:   Procedure Laterality Date   • APPENDECTOMY     • BACK SURGERY     • CHOLECYSTECTOMY     • ESOPHAGOGASTRODUODENOSCOPY N/A 11/28/2016    Procedure: ESOPHAGOGASTRODUODENOSCOPY (EGD); Surgeon: Tania Garland MD;  Location:  GI LAB;   Service:    • GALLBLADDER SURGERY     • LAPAROTOMY N/A 10/25/2016    Procedure: LAPAROTOMY EXPLORATORY;  Surgeon: Karo Ruby MD;  Location: [facility];  Service:    • MOUTH SURGERY     • PERCUTANEOUS PINNING FEMORAL NECK FRACTURE     • SHOULDER SURGERY Right    • SHOULDER SURGERY     • SMALL INTESTINE SURGERY     • STOMACH SURGERY      bal surgery       Medications      Current Outpatient Medications:   •  albuterol (Ventolin HFA) 90 mcg/act inhaler, Inhale 2 puffs every 6 (six) hours as needed for wheezing, Disp: 8 g, Rfl: 1  •  ALPRAZolam (XANAX) 0.5 mg tablet, Take 1 tablet (0.5 mg total) by mouth daily at bedtime as needed for anxiety, Disp: 30 tablet, Rfl: 0  •  Cholecalciferol 25 MCG (1000 UT) tablet, Take 1 tablet (1,000 Units total) by mouth daily, Disp: 30 tablet, Rfl: 2  •  cyanocobalamin (VITAMIN B-12) 100 mcg tablet, Take 1 tablet (100 mcg total) by mouth daily, Disp: 30 tablet, Rfl: 5  •  Diclofenac Sodium (VOLTAREN) 1 %, Apply 2 g topically 4 (four) times a day, Disp: 100 g, Rfl: 1  •  docusate sodium (COLACE) 100 mg capsule, Take 1 capsule (100 mg total) by mouth 2 (two) times a day as needed for constipation, Disp: 30 capsule, Rfl: 0  •  doxepin (SINEquan) 25 mg capsule, Take 1 capsule (25 mg total) by mouth daily at bedtime, Disp: 30 capsule, Rfl: 0  •  ergocalciferol (VITAMIN D2) 50,000 units, Take 1 capsule (50,000 Units total) by mouth once a week, Disp: 13 capsule, Rfl: 1  •  folic acid (FOLVITE) 1 mg tablet, Take 1 tablet (1 mg total) by mouth daily, Disp: 30 tablet, Rfl: 2  •  hydrOXYzine HCL (ATARAX) 50 mg tablet, , Disp: , Rfl:   •  levETIRAcetam (KEPPRA) 1000 MG tablet, TAKE 1 TABLET BY MOUTH EVERY 12 HOURS, Disp: 60 tablet, Rfl: 0  •  lisinopril (ZESTRIL) 2.5 mg tablet, Take 1 tablet (2.5 mg total) by mouth daily, Disp: 90 tablet, Rfl: 2  •  LORazepam (ATIVAN) 1 mg tablet, Take 1 tablet (1 mg total) by mouth daily at bedtime, Disp: 30 tablet, Rfl: 0  •  midodrine (PROAMATINE) 5 mg tablet, Take 1 tablet (5 mg total) by mouth 3 (three) times a day before meals (Patient not taking: Reported on 7/5/2023), Disp: 90 tablet, Rfl: 0  •  Multiple Vitamin (TAB-A-BONI PO), Take 1 tablet by mouth daily, Disp: , Rfl:   •  naproxen (Naprosyn) 500 mg tablet, Take 1 tablet (500 mg total) by mouth 2 (two) times a day with meals, Disp: 30 tablet, Rfl: 0  •  omeprazole (PriLOSEC) 20 mg delayed release capsule, Take 1 capsule (20 mg total) by mouth daily, Disp: 30 capsule, Rfl: 11  •  polyethylene glycol-electrolytes (TriLyte) 4000 mL solution, Take 4,000 mL by mouth once for 1 dose Take 4000 mL by mouth once for 1 dose. Use as directed, Disp: 4000 mL, Rfl: 0  •  sodium chloride 1 g tablet, Take 2 tablets (2 g total) by mouth 3 (three) times a day with meals, Disp: 240 tablet, Rfl: 3  •  thiamine (VITAMIN B1) 100 mg tablet, Take 100 mg by mouth daily, Disp: , Rfl:     Allergies    No Known Allergies    The following portions of the patient's history were reviewed and updated as appropriate: allergies, current medications, past family history, past medical history, past social history, past surgical history and problem list.    Investigations    I personally reviewed the CT, MRI and XRAY results with the patient:      Physical Exam    Vitals:  Blood pressure 138/82, pulse 85, temperature 98.1 °F (36.7 °C), temperature source Temporal, height 5' 9" (1.753 m), weight 57.2 kg (126 lb), SpO2 95 %. ,Body mass index is 18.61 kg/m².     General:  Normal, well developed, not in distress/pain     Skin:   No issues, no rashes noted     Musculoskeletal:   5/5 strength throughout all muscle groups  No tenderness to palpation of the spine       Neurologic Exam:  Awake and alert  Oriented x3  Speech clear and fluent  POPE   Sensation to light touch and pin prick intact throughout  No burroughs's  No clonus  2+ patellar reflexes     Gait:   normal gait, chin on chest deformity with accentuated thoracic kyphosis

## 2023-07-28 DIAGNOSIS — F51.04 PSYCHOPHYSIOLOGICAL INSOMNIA: ICD-10-CM

## 2023-07-28 RX ORDER — DOXEPIN HYDROCHLORIDE 25 MG/1
25 CAPSULE ORAL
Qty: 30 CAPSULE | Refills: 0 | Status: SHIPPED | OUTPATIENT
Start: 2023-07-28

## 2023-07-31 ENCOUNTER — TELEPHONE (OUTPATIENT)
Dept: NUTRITION | Facility: HOSPITAL | Age: 58
End: 2023-07-31

## 2023-07-31 ENCOUNTER — CONSULT (OUTPATIENT)
Dept: PAIN MEDICINE | Facility: CLINIC | Age: 58
End: 2023-07-31
Payer: COMMERCIAL

## 2023-07-31 VITALS
RESPIRATION RATE: 16 BRPM | WEIGHT: 125.4 LBS | BODY MASS INDEX: 21.41 KG/M2 | HEART RATE: 94 BPM | HEIGHT: 64 IN | DIASTOLIC BLOOD PRESSURE: 77 MMHG | SYSTOLIC BLOOD PRESSURE: 122 MMHG

## 2023-07-31 DIAGNOSIS — F11.20 UNCOMPLICATED OPIOID DEPENDENCE (HCC): ICD-10-CM

## 2023-07-31 DIAGNOSIS — M51.36 LUMBAR DEGENERATIVE DISC DISEASE: Primary | ICD-10-CM

## 2023-07-31 DIAGNOSIS — M54.12 RADICULOPATHY, CERVICAL REGION: ICD-10-CM

## 2023-07-31 DIAGNOSIS — G89.4 CHRONIC PAIN SYNDROME: ICD-10-CM

## 2023-07-31 DIAGNOSIS — M48.061 SPINAL STENOSIS OF LUMBAR REGION, UNSPECIFIED WHETHER NEUROGENIC CLAUDICATION PRESENT: ICD-10-CM

## 2023-07-31 DIAGNOSIS — Z79.891 LONG-TERM CURRENT USE OF OPIATE ANALGESIC: ICD-10-CM

## 2023-07-31 DIAGNOSIS — M54.12 CERVICAL RADICULOPATHY: ICD-10-CM

## 2023-07-31 DIAGNOSIS — M47.816 LUMBAR SPONDYLOSIS: ICD-10-CM

## 2023-07-31 PROCEDURE — 99204 OFFICE O/P NEW MOD 45 MIN: CPT | Performed by: ANESTHESIOLOGY

## 2023-07-31 RX ORDER — PREGABALIN 50 MG/1
50 CAPSULE ORAL 3 TIMES DAILY
Qty: 90 CAPSULE | Refills: 1 | Status: SHIPPED | OUTPATIENT
Start: 2023-07-31 | End: 2023-08-30

## 2023-07-31 NOTE — PROGRESS NOTES
Assessment:  1. Lumbar degenerative disc disease    2. Radiculopathy, cervical region    3. Spinal stenosis of lumbar region, unspecified whether neurogenic claudication present    4. Lumbar spondylosis        Plan:  Patient is a 67-year-old male complains of arm pain, low back pain, leg pain with chronic pain syndrome secondary to cervical radiculopathy status post ACDF, lumbar radiculopathy presents to office for initial consultation. Patient has severe scoliosis and is not experiencing increasing neck pain and low back pain with radicular symptoms into bilateral upper and lower extremities. Patient was being evaluated for a significant fusion however due to the osteopenia he is not a candidate for surgical intervention at this time. We will have to manage his pain conservatively. We will consider medial branch blocks of the cervical spine in the near future. We will also trial neuropathic agents as well as adopting pain control regimen. Patient does have cirrhosis of the liver is not a candidate for Tylenol continue medications. 1. We will provide pt with water therapy referral   2. We will trial lyrica 50 mg po tid  3. We will trial diclofenac 50 mg po BID  4. We will start pt on a low does opioid regimen O[facility] 5 QID at [facility]     There are risks associated with opioid medications, including dependence, addiction and tolerance. The patient understands and agrees to use these medications only as prescribed. Potential side effects of the medications include, but are not limited to, constipation, drowsiness, addiction, impaired judgment and risk of fatal overdose if not taken as prescribed. The patient was warned against driving while taking sedation medications. Sharing medications is a felony. At this point in time, the patient is showing no signs of addiction, abuse, diversion or suicidal ideation.     A urine drug screen was collected at today's office visit as part of our medication management protocol. The point of care testing results were appropriate for what was being prescribed. The specimen will be sent for confirmatory testing. The drug screen is medically necessary because the patient is either dependent on opioid medication or is being considered for opioid medication therapy and the results could impact ongoing or future treatment. The drug screen is to evaluate for the presences or absence of prescribed, non-prescribed, and/or illicit drugs/substances. [place] Prescription Drug Monitoring Program report was reviewed and was appropriate     History of Present Illness: The patient is a 63 y.o. male who presents for consultation in regards to Neck Pain. Symptoms have been present for several years. Symptoms began following a non work related injury. Pain is reported to be 10 on the numeric rating scale. Symptoms are felt constantly and worst in the nighttime. Symptoms are characterized as cramping, shooting, numbing, tingling, pressure-like and throbbing. Symptoms are associated with bilateral arm weakness and bilateral leg weakness. Aggravating factors include standing, bending, leaning forward, leaning bckward, sitting, walking, exercise and coughing/sneezing. Relieving factors include lying down and relaxation. No change in symptoms with kneeling, turning the head, coughing/sneezing and bowel movements. Treatments that have been helpful include surgery , physical therapy and heat/ice. home exercise have provided no relief. Medications to relieve symptoms include naproxen diclofenac. Review of Systems:    Review of Systems   Musculoskeletal: Positive for back pain, gait problem, joint swelling, myalgias and neck pain. Psychiatric/Behavioral: The patient is nervous/anxious. All other systems reviewed and are negative.           Past Medical History:   Diagnosis Date   • Alcohol abuse    • Traore esophagus    • Bowel obstruction (HCC)    • Bowel perforation (HCC)    • Cardiac disease    • Continuous chronic alcoholism ([facility]) 10/5/2017   • COPD (chronic obstructive pulmonary disease) (HCC)    • History of shoulder surgery     Right shoulder   • History of transfusion    • Hx of cervical spine surgery    • Hypertension    • Incisional hernia 10/8/2017   • MI, old    • Mitral regurgitation    • Psychiatric disorder    • Seizures [facility])        Past Surgical History:   Procedure Laterality Date   • APPENDECTOMY     • BACK SURGERY     • CHOLECYSTECTOMY     • ESOPHAGOGASTRODUODENOSCOPY N/A 11/28/2016    Procedure: ESOPHAGOGASTRODUODENOSCOPY (EGD); Surgeon: Nakul Bowles MD;  Location: [facility]; Service:    • GALLBLADDER SURGERY     • LAPAROTOMY N/A 10/25/2016    Procedure: LAPAROTOMY EXPLORATORY;  Surgeon: Madelin Orellana MD;  Location: [facility];  Service:    • MOUTH SURGERY     • PERCUTANEOUS PINNING FEMORAL NECK FRACTURE     • SHOULDER SURGERY Right    • SHOULDER SURGERY     • SMALL INTESTINE SURGERY     • STOMACH SURGERY      bal surgery       Family History   Problem Relation Age of Onset   • Breast cancer Mother    • Prostate cancer Father    • Skin cancer Brother        Social History     Occupational History   • Not on file   Tobacco Use   • Smoking status: Every Day     Packs/day: 3.00     Years: 35.00     Total pack years: 105.00     Types: Cigarettes   • Smokeless tobacco: Never   Vaping Use   • Vaping Use: Former   Substance and Sexual Activity   • Alcohol use:  Yes     Alcohol/week: 42.0 standard drinks of alcohol     Types: 42 Cans of beer per week     Comment: a six pack a day   • Drug use: No   • Sexual activity: Not Currently     Partners: Female         Current Outpatient Medications:   •  albuterol (Ventolin HFA) 90 mcg/act inhaler, Inhale 2 puffs every 6 (six) hours as needed for wheezing, Disp: 8 g, Rfl: 1  •  ALPRAZolam (XANAX) 0.5 mg tablet, Take 1 tablet (0.5 mg total) by mouth daily at bedtime as needed for anxiety, Disp: 30 tablet, Rfl: 0  • Cholecalciferol 25 MCG (1000 UT) tablet, Take 1 tablet (1,000 Units total) by mouth daily, Disp: 30 tablet, Rfl: 2  •  cyanocobalamin (VITAMIN B-12) 100 mcg tablet, Take 1 tablet (100 mcg total) by mouth daily, Disp: 30 tablet, Rfl: 5  •  Diclofenac Sodium (VOLTAREN) 1 %, Apply 2 g topically 4 (four) times a day, Disp: 100 g, Rfl: 1  •  docusate sodium (COLACE) 100 mg capsule, Take 1 capsule (100 mg total) by mouth 2 (two) times a day as needed for constipation, Disp: 30 capsule, Rfl: 0  •  doxepin (SINEquan) 25 mg capsule, Take 1 capsule by mouth once daily at bedtime, Disp: 30 capsule, Rfl: 0  •  ergocalciferol (VITAMIN D2) 50,000 units, Take 1 capsule (50,000 Units total) by mouth once a week, Disp: 13 capsule, Rfl: 1  •  folic acid (FOLVITE) 1 mg tablet, Take 1 tablet (1 mg total) by mouth daily, Disp: 30 tablet, Rfl: 2  •  hydrOXYzine HCL (ATARAX) 50 mg tablet, , Disp: , Rfl:   •  levETIRAcetam (KEPPRA) 1000 MG tablet, TAKE 1 TABLET BY MOUTH EVERY 12 HOURS, Disp: 60 tablet, Rfl: 0  •  lisinopril (ZESTRIL) 2.5 mg tablet, Take 1 tablet (2.5 mg total) by mouth daily, Disp: 90 tablet, Rfl: 2  •  LORazepam (ATIVAN) 1 mg tablet, Take 1 tablet (1 mg total) by mouth daily at bedtime, Disp: 30 tablet, Rfl: 0  •  Multiple Vitamin (TAB-A-OBNI PO), Take 1 tablet by mouth daily, Disp: , Rfl:   •  naproxen (Naprosyn) 500 mg tablet, Take 1 tablet (500 mg total) by mouth 2 (two) times a day with meals, Disp: 30 tablet, Rfl: 0  •  omeprazole (PriLOSEC) 20 mg delayed release capsule, Take 1 capsule (20 mg total) by mouth daily, Disp: 30 capsule, Rfl: 11  •  polyethylene glycol-electrolytes (TriLyte) 4000 mL solution, Take 4,000 mL by mouth once for 1 dose Take 4000 mL by mouth once for 1 dose.  Use as directed, Disp: 4000 mL, Rfl: 0  •  sodium chloride 1 g tablet, Take 2 tablets (2 g total) by mouth 3 (three) times a day with meals, Disp: 240 tablet, Rfl: 3  •  thiamine (VITAMIN B1) 100 mg tablet, Take 100 mg by mouth daily, Disp: , Rfl:   •  midodrine (PROAMATINE) 5 mg tablet, Take 1 tablet (5 mg total) by mouth 3 (three) times a day before meals (Patient not taking: Reported on 7/5/2023), Disp: 90 tablet, Rfl: 0    No Known Allergies    Physical Exam:    /77 (BP Location: Right arm, Patient Position: Sitting, Cuff Size: Standard)   Pulse 94   Resp 16   Ht 5' 4" (1.626 m)   Wt 56.9 kg (125 lb 6.4 oz)   BMI 21.52 kg/m²     Constitutional: normal, well developed, well nourished, alert, in no distress and non-toxic and no overt pain behavior. Eyes: anicteric  HEENT: grossly intact  Neck: supple, symmetric, trachea midline and no masses   Pulmonary:even and unlabored  Cardiovascular:No edema or pitting edema present  Skin:Normal without rashes or lesions and well hydrated  Psychiatric:Mood and affect appropriate  Neurologic:Cranial Nerves II-XII grossly intact  Musculoskeletal:antalgic         Imaging    MRI cervical spine wo contrast  Status: Final result     PACS Images     Show images for MRI cervical spine wo contrast  Study Result    Narrative & Impression   MRI CERVICAL SPINE WITHOUT CONTRAST     INDICATION: M54.12: Radiculopathy, cervical region  Z98.1: Arthrodesis status.     COMPARISON: 8/12/2014 and CT from 5/20/2022     TECHNIQUE:  Multiplanar, multisequence imaging of the cervical spine was performed. .        IMAGE QUALITY:  Diagnostic     FINDINGS:     ALIGNMENT: The patient is status post anterior cervical discectomy and spinal fusion from C5-C7. Patient is also status post posterior spinal fusion from C4-T1. There is trace anterolisthesis at C7-T1.     MARROW SIGNAL:  Normal marrow signal is identified within the visualized bony structures.   No discrete marrow lesion.     CERVICAL AND VISUALIZED THORACIC CORD:  Normal signal within the visualized cord.     PREVERTEBRAL AND PARASPINAL SOFT TISSUES:  Normal.     VISUALIZED POSTERIOR FOSSA:  The visualized posterior fossa demonstrates no abnormal signal.     CERVICAL DISC SPACES:     C2-C3: There is a disc osteophyte complex with uncovertebral spurring. There is hypertrophy of the posterior arch. There is moderate canal stenosis with contact of the cord. There is mild to moderate foraminal narrowing bilaterally.     C3-C4: There is a disc osteophyte complex with uncovertebral spurring. There is hypertrophy of the posterior arch. There is mild to moderate canal stenosis. There is mild to moderate foraminal narrowing.     C4-C5: Canal is decompressed by laminectomy. No significant foraminal narrowing.     C5-C6: Canal is decompressed by laminectomy. No significant foraminal narrowing.     C6-C7: Canal is decompressed by laminectomy. No significant foraminal narrowing.     C7-T1: No significant canal stenosis or foraminal narrowing. Canal is decompressed by laminectomy.     UPPER THORACIC DISC SPACES:  Normal.     OTHER FINDINGS:  None.     IMPRESSION:     Postoperative changes and multilevel cervical spondylosis, as described above.     Interval development of cervical spondylosis at C2-C3 and C3-C4 since MRI from 8/12/2014 as described above. No intrinsic cord signal abnormality seen. Study Result    Narrative & Impression   LUMBAR SPINE     INDICATION:   R52: Pain, unspecified.     COMPARISON: 5/9/2023     VIEWS:  XR SPINE LUMBAR 2 OR 3 VIEWS INJURY        FINDINGS:     There are 5 non rib bearing lumbar vertebral bodies.     Mild L3 compression deformity.     Alignment is unremarkable without dynamic instability.     Age-appropriate lumbar degenerative changes are seen.     The pedicles appear intact.     There are atherosclerotic calcifications. Soft tissues are otherwise unremarkable.     IMPRESSION:  Mild L3 compression deformity.  Correlate with clinical exam. Degenerative changes as described.     The study was marked in EPIC for significant notification.     Workstation performed: [id number]            No orders to display       No orders of the defined types were placed in this encounter.

## 2023-07-31 NOTE — TELEPHONE ENCOUNTER
Spoke with patient regarding his OP MNT appointment scheduled for 8/1. His insurance will not cover visit and it will be converted to self-pay. Patient declined visit. Cancelled in [address] [place] [address] 15.

## 2023-07-31 NOTE — PATIENT INSTRUCTIONS
Core Strengthening Exercises   WHAT YOU NEED TO KNOW:   What do I need to know about core strengthening exercises? Your core includes the muscles of your lower back, hip, pelvis, and abdomen. Core strengthening exercises help heal and strengthen these muscles. This helps prevent another injury, and keeps your pelvis, spine, and hips in the correct position. What do I need to know about exercise safety? Talk to your healthcare provider before you start an exercise program. A physical therapist can teach you how to do core strengthening exercises safely. Do the exercises on a mat or firm surface. A firm surface will support your spine and prevent low back pain. Do not do these exercises on a bed. Move slowly and smoothly. Avoid fast or jerky motions. Stop if you feel pain. You may feel some discomfort at first, but you should not feel pain. Tell your provider or physical therapist if you have pain while you exercise. Regular exercise will help decrease your discomfort over time. Breathe normally during core exercises. Do not hold your breath. This may cause an increase in blood pressure and prevent muscle strengthening. Your healthcare provider will tell you when to inhale and exhale during the exercise. Begin all of your exercises with abdominal bracing. Abdominal bracing helps warm up your core muscles. You can also practice abdominal bracing throughout the day. Lie on your back with your knees bent and feet flat on the floor. Place your arms in a relaxed position beside your body. Tighten your abdominal muscles. Pull your belly button in and up toward your spine. Hold for 5 seconds. Relax your muscles. Repeat 10 times. How do I perform core strengthening exercises? Your healthcare provider will tell you how often to do these exercises. The provider will also tell you how many repetitions of each exercise you should do. Hold each exercise for 5 seconds or as directed.  As you get stronger, increase your hold to 10 to 15 seconds. You can do some of these exercises on a stability ball, or with a weight. Ask your healthcare provider how to use a stability ball or weight for these exercises:  Bridging:  Lie on your back with your knees bent and feet flat on the floor. Rest your arms at your side. Tighten your buttocks, and then lift your hips 1 inch off the floor. Hold for 5 seconds. When you can do this exercise without pain for 10 seconds, increase the distance you lift your hips. A good goal is to be able to lift your hips so that your shoulders, hips, and knees are in a straight line. Dead bug:  Lie on your back with your knees bent and feet flat on the floor. Place your arms in a relaxed position beside your body. Begin with abdominal bracing. Next, raise one leg, keeping your knee bent. Hold for 5 seconds. Repeat with the other leg. When you can do this exercise without pain for 10 to 15 seconds, you may raise one straight leg and hold. Repeat with the other leg. Quadruped:  Place your hands and knees on the floor. Keep your wrists directly below your shoulders and your knees directly below your hips. Pull your belly button in toward your spine. Do not flatten or arch your back. Tighten your abdominal muscles below your belly button. Hold for 5 seconds. When you can do this exercise without pain for 10 to 15 seconds, you may extend one arm and hold. Repeat on the other side. Side bridge exercises:      Standing side bridge:  Stand next to a wall and extend one arm toward the wall. Place your palm flat on the wall with your fingers pointing upward. Begin with abdominal bracing. Next, without moving your feet, slowly bend your arm to 90 degrees. Hold for 5 seconds. Repeat on the other side. When you can do this exercise without pain for 10 to 15 seconds, you may do the bent leg side bridge on the floor.          Bent leg side bridge:  Lie on one side with your legs, hips, and shoulders in a straight line. Prop yourself up onto your forearm so your elbow is directly below your shoulder. Bend your knees back to 90 degrees. Begin with abdominal bracing. Next, lift your hips and balance yourself on your forearm and knees. Hold for 5 seconds. Repeat on the other side. When you can do this exercise without pain for 10 to 15 seconds, you may do the straight leg side bridge on the floor. Straight leg side bridge:  Lie on one side with your legs, hips, and shoulders in a straight line. Prop yourself up onto your forearm so your elbow is directly below your shoulder. Begin with abdominal bracing. Lift your hips off the floor and balance yourself on your forearm and the outside of your flexed foot. Do not let your ankle bend sideways. Hold for 5 seconds. Repeat on the other side. When you can do this exercise without pain for 10 to 15 seconds, ask your healthcare provider for more advanced exercises. Superman:  Lie on your stomach. Extend your arms forward on the floor. Tighten your abdominal muscles and lift your right hand and left leg off the floor. Hold this position. Slowly return to the starting position. Tighten your abdominal muscles and lift your left hand and right leg off the floor. Hold this position. Slowly return to the starting position. Clam:  Lie on your side with your knees bent. Put your bottom arm under your head to keep your neck in line. Put your top hand on your hip to keep your pelvis from moving. Put your heels together, and keep them together during this exercise. Slowly raise your top knee toward the ceiling. Then lower your leg so your knees are together. Repeat this exercise 10 times. Then switch sides and do the exercise 10 times with the other leg. Curl up:  Lie on your back with your knees bent and feet flat on the floor. Place your hands, palms down, underneath your lower back.  Next, with your elbows on the floor, lift your shoulders and chest 2 to 3 inches off the floor. Keep your head in line with your shoulders. Hold this position. Slowly return to the starting position. Straight leg raises:  Lie on your back with one leg straight. Bend the other knee and place your foot flat on the floor. Tighten your abdominal muscles. Keep your leg straight and slowly lift it straight up 6 to 12 inches off the floor. Hold this position. Lower your leg slowly. Do as many repetitions as directed on this side. Repeat with the other leg. Plank:  Lie on your stomach. Bend your elbows and place your forearms flat on the floor. Lift your chest, stomach, and knees off the floor. Make sure your elbows are below your shoulders. Your body should be in a straight line. Do not let your hips or lower back sink to the ground. Squeeze your abdominal muscles together and hold for 15 seconds. To make this exercise harder, hold for 30 seconds or lift 1 leg at a time. Bicycles:  Lie on your back. Bend both knees and bring them toward your chest. Your calves should be parallel to the floor. Place the palms of your hands on the back of your head. Straighten your right leg and keep it lifted 2 inches off the floor. Raise your head and shoulders off the floor and twist towards your left. Keep your head and shoulders lifted. Bend your right knee while you straighten your left leg. Keep your left leg 2 inches off the floor. Twist your head and chest towards the left leg. Continue to straighten 1 leg at a time and twist.       When should I call my doctor or physical therapist?   You have sharp or worsening pain during exercise or at rest.    You have questions or concerns about your condition, care, or exercise program.    CARE AGREEMENT:   You have the right to help plan your care. Learn about your health condition and how it may be treated. Discuss treatment options with your healthcare providers to decide what care you want to receive.  You always have the right to refuse treatment. The above information is an  only. It is not intended as medical advice for individual conditions or treatments. Talk to your doctor, nurse or pharmacist before following any medical regimen to see if it is safe and effective for you. © Copyright Patricia Harris 2022 Information is for End User's use only and may not be sold, redistributed or otherwise used for commercial purposes.

## 2023-08-02 LAB
6MAM UR QL CFM: NEGATIVE NG/ML
7AMINOCLONAZEPAM UR QL CFM: NEGATIVE NG/ML
A-OH ALPRAZ UR QL CFM: NORMAL NG/ML
ACCEPTABLE CREAT UR QL: ABNORMAL MG/DL
ACCEPTIBLE SP GR UR QL: NORMAL
AMITRIP UR QL CFM: NEGATIVE NG/ML
AMPHET UR QL CFM: NEGATIVE NG/ML
AMPHET UR QL CFM: NEGATIVE NG/ML
BUPRENORPHINE UR QL CFM: NEGATIVE NG/ML
BZE UR QL CFM: NEGATIVE NG/ML
DESIPRAMINE UR QL CFM: NEGATIVE NG/ML
EDDP UR QL CFM: NEGATIVE NG/ML
ETHYL GLUCURONIDE UR QL CFM: ABNORMAL NG/ML
ETHYL SULFATE UR QL SCN: ABNORMAL NG/ML
FENTANYL UR QL CFM: NEGATIVE NG/ML
FLUOXETINE UR QL CFM: NEGATIVE NG/ML
GLIADIN IGG SER IA-ACNC: NEGATIVE NG/ML
GLUCOSE 30M P 50 G LAC PO SERPL-MCNC: NEGATIVE NG/ML
HYDROCODONE UR QL CFM: NEGATIVE NG/ML
HYDROMORPHONE UR QL CFM: NEGATIVE NG/ML
LORAZEPAM UR QL CFM: ABNORMAL NG/ML
MDMA UR QL CFM: NEGATIVE NG/ML
ME-PHENIDATE UR QL CFM: NEGATIVE NG/ML
MEPERIDINE UR QL CFM: NEGATIVE NG/ML
METHADONE UR QL CFM: NEGATIVE NG/ML
METHAMPHET UR QL CFM: NEGATIVE NG/ML
MORPHINE UR QL CFM: NEGATIVE NG/ML
NITRITE UR QL: NORMAL UG/ML
NORBUPRENORPHINE UR QL CFM: NEGATIVE NG/ML
NORDIAZEPAM UR QL CFM: NEGATIVE NG/ML
NORFENTANYL UR QL CFM: NEGATIVE NG/ML
NORFLUOXETINE UR QL CFM: NEGATIVE NG/ML
NORHYDROCODONE UR QL CFM: NEGATIVE NG/ML
NORMEPERIDINE UR QL CFM: NEGATIVE NG/ML
NOROXYCODONE UR QL CFM: NEGATIVE NG/ML
NORTRIP UR QL CFM: NEGATIVE NG/ML
OLANZAPINE QUANTIFICATION: NEGATIVE NG/ML
OPC-3373 QUANTIFICATION: NEGATIVE
OXAZEPAM UR QL CFM: NEGATIVE NG/ML
OXYCODONE UR QL CFM: NEGATIVE NG/ML
OXYMORPHONE UR QL CFM: NEGATIVE NG/ML
OXYMORPHONE UR QL CFM: NEGATIVE NG/ML
PARA-FLUOROFENTANYL QUANTIFICATION: NORMAL NG/ML
PROLACTIN SERPL-MCNC: NEGATIVE NG/ML
RESULT ALL_PRESCRIBED MEDS AND SPECIAL INSTRUCTIONS: NORMAL
SECOBARBITAL UR QL CFM: NEGATIVE NG/ML
SL AMB 4-ANPP QUANTIFICATION: NORMAL NG/ML
SL AMB 7-OH-MITRAGYNINE (KRATOM ALKALOID) QUANTIFICATION: NEGATIVE NG/ML
SL AMB ACETYL FENTANYL QUANTIFICATION: NORMAL NG/ML
SL AMB ACETYL NORFENTANYL QUANTIFICATION: NORMAL NG/ML
SL AMB ACRYL FENTANYL QUANTIFICATION: NORMAL NG/ML
SL AMB CARFENTANIL QUANTIFICATION: NORMAL NG/ML
SL AMB CITALOPRAM/ESCITALOPRAM QUANTIFICATION: NEGATIVE NG/ML
SL AMB CITALOPRAM/ESCITALOPRAM QUANTIFICATION: NEGATIVE NG/ML
SL AMB CLOZAPINE QUANTIFICATION: NEGATIVE NG/ML
SL AMB CTHC (MARIJUANA METABOLITE) QUANTIFICATION: NEGATIVE NG/ML
SL AMB DEXTRORPHAN (DEXTROMETHORPHAN METABOLITE) QUANT: NEGATIVE NG/ML
SL AMB HALOPERIDOL  QUANTIFICATION: NEGATIVE NG/ML
SL AMB HALOPERIDOL METABOLITE QUANTIFICATION: NEGATIVE NG/ML
SL AMB HYDROXYBUPROPION QUANTIFICATION: NEGATIVE NG/ML
SL AMB HYDROXYRISPERIDONE QUANTIFICATION: NEGATIVE NG/ML
SL AMB N-DESMETHYL-TRAMADOL QUANTIFICATION: NEGATIVE NG/ML
SL AMB N-DESMETHYLCLOZAPINE QUANTIFICATION: NEGATIVE NG/ML
SL AMB NORQUETIAPINE QUANTIFICATION: NEGATIVE NG/ML
SL AMB PHENTERMINE QUANTIFICATION: NEGATIVE NG/ML
SL AMB PREGABALIN QUANTIFICATION: ABNORMAL
SL AMB QUETIAPINE QUANTIFICATION: NEGATIVE NG/ML
SL AMB RISPERIDONE QUANTIFICATION: NEGATIVE NG/ML
SL AMB RITALINIC ACID QUANTIFICATION: NEGATIVE NG/ML
SPECIMEN PH ACCEPTABLE UR: NORMAL
TAPENTADOL UR QL CFM: NEGATIVE NG/ML
TEMAZEPAM UR QL CFM: NEGATIVE NG/ML
TEMAZEPAM UR QL CFM: NEGATIVE NG/ML
TRAMADOL UR QL CFM: NEGATIVE NG/ML
URATE/CREAT 24H UR: NEGATIVE NG/ML

## 2023-08-11 ENCOUNTER — OFFICE VISIT (OUTPATIENT)
Dept: FAMILY MEDICINE CLINIC | Facility: CLINIC | Age: 58
End: 2023-08-11
Payer: COMMERCIAL

## 2023-08-11 VITALS
TEMPERATURE: 97.8 F | BODY MASS INDEX: 22.72 KG/M2 | OXYGEN SATURATION: 96 % | WEIGHT: 133.1 LBS | HEART RATE: 82 BPM | HEIGHT: 64 IN | DIASTOLIC BLOOD PRESSURE: 82 MMHG | SYSTOLIC BLOOD PRESSURE: 130 MMHG | RESPIRATION RATE: 18 BRPM

## 2023-08-11 DIAGNOSIS — Z00.00 ANNUAL PHYSICAL EXAM: Primary | ICD-10-CM

## 2023-08-11 DIAGNOSIS — G40.909 SEIZURE DISORDER (HCC): ICD-10-CM

## 2023-08-11 DIAGNOSIS — Z23 NEED FOR VACCINATION: ICD-10-CM

## 2023-08-11 DIAGNOSIS — F51.04 PSYCHOPHYSIOLOGICAL INSOMNIA: ICD-10-CM

## 2023-08-11 DIAGNOSIS — I10 ESSENTIAL HYPERTENSION: ICD-10-CM

## 2023-08-11 DIAGNOSIS — K70.10 ALCOHOLIC HEPATITIS WITHOUT ASCITES: ICD-10-CM

## 2023-08-11 LAB
ANION GAP SERPL CALCULATED.3IONS-SCNC: 5 MMOL/L
BUN SERPL-MCNC: 7 MG/DL (ref 5–25)
CALCIUM SERPL-MCNC: 9 MG/DL (ref 8.3–10.1)
CHLORIDE SERPL-SCNC: 99 MMOL/L (ref 96–108)
CO2 SERPL-SCNC: 27 MMOL/L (ref 21–32)
CREAT SERPL-MCNC: 0.79 MG/DL (ref 0.6–1.3)
GFR SERPL CREATININE-BSD FRML MDRD: 99 ML/MIN/1.73SQ M
GLUCOSE P FAST SERPL-MCNC: 84 MG/DL (ref 65–99)
POTASSIUM SERPL-SCNC: 3.9 MMOL/L (ref 3.5–5.3)
SODIUM SERPL-SCNC: 131 MMOL/L (ref 135–147)

## 2023-08-11 PROCEDURE — 80177 DRUG SCRN QUAN LEVETIRACETAM: CPT | Performed by: NURSE PRACTITIONER

## 2023-08-11 PROCEDURE — T1015 CLINIC SERVICE: HCPCS | Performed by: FAMILY MEDICINE

## 2023-08-11 PROCEDURE — 80048 BASIC METABOLIC PNL TOTAL CA: CPT | Performed by: NURSE PRACTITIONER

## 2023-08-11 RX ORDER — LEVETIRACETAM 1000 MG/1
1000 TABLET ORAL EVERY 12 HOURS
Qty: 60 TABLET | Refills: 0 | Status: SHIPPED | OUTPATIENT
Start: 2023-08-11 | End: 2023-08-15

## 2023-08-11 RX ORDER — THIAMINE MONONITRATE (VIT B1) 100 MG
100 TABLET ORAL DAILY
Qty: 90 TABLET | Refills: 1 | Status: SHIPPED | OUTPATIENT
Start: 2023-08-11

## 2023-08-11 RX ORDER — DOXEPIN HYDROCHLORIDE 25 MG/1
25 CAPSULE ORAL
Qty: 90 CAPSULE | Refills: 1 | Status: SHIPPED | OUTPATIENT
Start: 2023-08-11 | End: 2023-08-11

## 2023-08-11 RX ORDER — DOXEPIN HYDROCHLORIDE 50 MG/1
50 CAPSULE ORAL
Qty: 30 CAPSULE | Refills: 0 | Status: SHIPPED | OUTPATIENT
Start: 2023-08-11

## 2023-08-11 NOTE — PROGRESS NOTES
[address]    NAME: Oz Vital  AGE: 63 y.o. SEX: male  : 1965     DATE: 2023     Assessment and Plan:     Problem List Items Addressed This Visit        Digestive    Alcoholic hepatitis without ascites    Relevant Medications    thiamine (VITAMIN B1) 100 mg tablet       Nervous and Auditory    Seizure disorder (HCC)    Relevant Medications    levETIRAcetam (KEPPRA) 1000 MG tablet       Other    Insomnia    Relevant Medications    doxepin (SINEquan) 50 mg capsule   Other Visit Diagnoses     Annual physical exam    -  Primary    Need for vaccination        Relevant Orders    Hepatitis A hepatitis B combined vaccine IM (Completed)          Immunizations and preventive care screenings were discussed with patient today. Appropriate education was printed on patient's after visit summary. Discussed risks and benefits of prostate cancer screening. We discussed the controversial history of PSA screening for prostate cancer in the [facility] as well as the risk of over detection and over treatment of prostate cancer by way of PSA screening. The patient understands that PSA blood testing is an imperfect way to screen for prostate cancer and that elevated PSA levels in the blood may also be caused by infection, inflammation, prostatic trauma or manipulation, urological procedures, or by benign prostatic enlargement. The role of the digital rectal examination in prostate cancer screening was also discussed and I discussed with him that there is large interobserver variability in the findings of digital rectal examination. Counseling:  Alcohol/drug use: discussed moderation in alcohol intake, the recommendations for healthy alcohol use, and avoidance of illicit drug use. Dental Health: discussed importance of regular tooth brushing, flossing, and dental visits.   Injury prevention: discussed safety/seat belts, safety helmets, smoke detectors, carbon dioxide detectors, and smoking near bedding or upholstery. Sexual health: discussed sexually transmitted diseases, partner selection, use of condoms, avoidance of unintended pregnancy, and contraceptive alternatives. · Exercise: the importance of regular exercise/physical activity was discussed. Recommend exercise 3-5 times per week for at least 30 minutes. Tobacco Cessation Counseling: Tobacco cessation counseling was provided. The patient is sincerely urged to quit consumption of tobacco. He is not ready to quit tobacco.         Return in about 2 months (around 10/11/2023) for Recheck doxepin use and xanax wean. Chief Complaint:     Chief Complaint   Patient presents with   • Annual Exam      History of Present Illness:     Adult Annual Physical   Patient here for a comprehensive physical exam. The patient reports no problems. Diet and Physical Activity  · Diet/Nutrition: well balanced diet, limited junk food and consuming 3-5 servings of fruits/vegetables daily. · Exercise: walking, 5-7 times a week on average and 30-60 minutes on average. Depression Screening  PHQ-2/9 Depression Screening         General Health  · Sleep: sleeps well and gets 7-8 hours of sleep on average. · Hearing: normal - bilateral.  · Vision: most recent eye exam >1 year ago and wears glasses. · Dental: no teeth; performs mouth care.  Health  · Symptoms include: none     Review of Systems:     Review of Systems   Constitutional: Negative for activity change, appetite change and fatigue. HENT: Negative for congestion, dental problem, hearing loss, mouth sores, postnasal drip, rhinorrhea, sore throat, trouble swallowing and voice change. Respiratory: Negative for cough, shortness of breath and wheezing. Cardiovascular: Negative for chest pain and leg swelling. Gastrointestinal: Negative for abdominal pain, blood in stool, constipation, diarrhea and nausea. Genitourinary: Negative for difficulty urinating, dysuria and hematuria. Musculoskeletal: Positive for arthralgias, back pain (following with pain mgmt) and neck pain. Skin: Negative for color change and rash. Neurological: Negative for dizziness, weakness and headaches. Psychiatric/Behavioral: Negative for agitation, confusion and sleep disturbance. Past Medical History:     Past Medical History:   Diagnosis Date   • Alcohol abuse    • Traore esophagus    • Bowel obstruction (HCC)    • Bowel perforation ([address])    • Cardiac disease    • Continuous chronic alcoholism ([address]) 10/5/2017   • COPD (chronic obstructive pulmonary disease) (HCC)    • History of shoulder surgery     Right shoulder   • History of transfusion    • Hx of cervical spine surgery    • Hypertension    • Incisional hernia 10/8/2017   • MI, old    • Mitral regurgitation    • Psychiatric disorder    • Seizures ([address])       Past Surgical History:     Past Surgical History:   Procedure Laterality Date   • APPENDECTOMY     • BACK SURGERY     • CHOLECYSTECTOMY     • ESOPHAGOGASTRODUODENOSCOPY N/A 11/28/2016    Procedure: ESOPHAGOGASTRODUODENOSCOPY (EGD); Surgeon: Do Arzate MD;  Location:  GI LAB;   Service:    • GALLBLADDER SURGERY     • LAPAROTOMY N/A 10/25/2016    Procedure: LAPAROTOMY EXPLORATORY;  Surgeon: Glory Quiroga MD;  Location: [facility];  Service:    • MOUTH SURGERY     • PERCUTANEOUS PINNING FEMORAL NECK FRACTURE     • SHOULDER SURGERY Right    • SHOULDER SURGERY     • SMALL INTESTINE SURGERY     • STOMACH SURGERY      bal surgery      Family History:     Family History   Problem Relation Age of Onset   • Breast cancer Mother    • Prostate cancer Father    • Skin cancer Brother       Social History:     Social History     Socioeconomic History   • Marital status: Single     Spouse name: None   • Number of children: None   • Years of education: None   • Highest education level: None   Occupational History   • None Tobacco Use   • Smoking status: Every Day     Packs/day: 3.00     Years: 35.00     Total pack years: 105.00     Types: Cigarettes   • Smokeless tobacco: Never   Vaping Use   • Vaping Use: Former   Substance and Sexual Activity   • Alcohol use: Yes     Alcohol/week: 42.0 standard drinks of alcohol     Types: 42 Cans of beer per week     Comment: a six pack a day   • Drug use: No   • Sexual activity: Not Currently     Partners: Female   Other Topics Concern   • None   Social History Narrative   • None     Social Determinants of Health     Financial Resource Strain: Not on file   Food Insecurity: No Food Insecurity (2/27/2023)    Hunger Vital Sign    • Worried About Running Out of Food in the Last Year: Never true    • Ran Out of Food in the Last Year: Never true   Transportation Needs: No Transportation Needs (2/27/2023)    PRAPARE - Transportation    • Lack of Transportation (Medical): No    • Lack of Transportation (Non-Medical):  No   Physical Activity: Not on file   Stress: Not on file   Social Connections: Not on file   Intimate Partner Violence: Not on file   Housing Stability: Low Risk  (2/27/2023)    Housing Stability Vital Sign    • Unable to Pay for Housing in the Last Year: No    • Number of Places Lived in the Last Year: 1    • Unstable Housing in the Last Year: No      Current Medications:     Current Outpatient Medications   Medication Sig Dispense Refill   • albuterol (Ventolin HFA) 90 mcg/act inhaler Inhale 2 puffs every 6 (six) hours as needed for wheezing 8 g 1   • ALPRAZolam (XANAX) 0.5 mg tablet Take 1 tablet (0.5 mg total) by mouth daily at bedtime as needed for anxiety 30 tablet 0   • Cholecalciferol 25 MCG (1000 UT) tablet Take 1 tablet (1,000 Units total) by mouth daily 30 tablet 2   • cyanocobalamin (VITAMIN B-12) 100 mcg tablet Take 1 tablet (100 mcg total) by mouth daily 30 tablet 5   • Diclofenac Sodium (VOLTAREN) 1 % Apply 2 g topically 4 (four) times a day 100 g 1   • diclofenac sodium (VOLTAREN) 50 mg EC tablet Take 1 tablet (50 mg total) by mouth 2 (two) times a day 60 tablet 1   • docusate sodium (COLACE) 100 mg capsule Take 1 capsule (100 mg total) by mouth 2 (two) times a day as needed for constipation 30 capsule 0   • doxepin (SINEquan) 50 mg capsule Take 1 capsule (50 mg total) by mouth daily at bedtime 30 capsule 0   • ergocalciferol (VITAMIN D2) 50,000 units Take 1 capsule (50,000 Units total) by mouth once a week 13 capsule 1   • folic acid (FOLVITE) 1 mg tablet Take 1 tablet (1 mg total) by mouth daily 30 tablet 2   • hydrOXYzine HCL (ATARAX) 50 mg tablet      • levETIRAcetam (KEPPRA) 1000 MG tablet Take 1 tablet (1,000 mg total) by mouth every 12 (twelve) hours 60 tablet 0   • lisinopril (ZESTRIL) 2.5 mg tablet Take 1 tablet (2.5 mg total) by mouth daily 90 tablet 2   • Multiple Vitamin (TAB-A-BONI PO) Take 1 tablet by mouth daily     • omeprazole (PriLOSEC) 20 mg delayed release capsule Take 1 capsule (20 mg total) by mouth daily 30 capsule 11   • pregabalin (LYRICA) 50 mg capsule Take 1 capsule (50 mg total) by mouth 3 (three) times a day 90 capsule 1   • sodium chloride 1 g tablet Take 2 tablets (2 g total) by mouth 3 (three) times a day with meals 240 tablet 3   • thiamine (VITAMIN B1) 100 mg tablet Take 1 tablet (100 mg total) by mouth daily 90 tablet 1   • polyethylene glycol-electrolytes (TriLyte) 4000 mL solution Take 4,000 mL by mouth once for 1 dose Take 4000 mL by mouth once for 1 dose. Use as directed 4000 mL 0     No current facility-administered medications for this visit. Allergies:     No Known Allergies   Physical Exam:     /82 (BP Location: Left arm, Patient Position: Sitting, Cuff Size: Standard)   Pulse 82   Temp 97.8 °F (36.6 °C)   Resp 18   Ht 5' 4" (1.626 m)   Wt 60.4 kg (133 lb 1.6 oz)   SpO2 96%   BMI 22.85 kg/m²     Physical Exam  Vitals and nursing note reviewed. Constitutional:       General: He is not in acute distress.      Appearance: He is well-developed. HENT:      Head: Normocephalic and atraumatic. Mouth/Throat:      Mouth: Mucous membranes are moist.      Pharynx: Oropharynx is clear. Eyes:      Conjunctiva/sclera: Conjunctivae normal.   Neck:      Comments: Kyphosis noted  Cardiovascular:      Rate and Rhythm: Normal rate and regular rhythm. Heart sounds: No murmur heard. Pulmonary:      Effort: Pulmonary effort is normal. No respiratory distress. Breath sounds: Normal breath sounds. Abdominal:      Palpations: Abdomen is soft. Tenderness: There is no abdominal tenderness. Musculoskeletal:         General: No swelling. Cervical back: Crepitus present. Decreased range of motion. Skin:     General: Skin is warm and dry. Capillary Refill: Capillary refill takes less than 2 seconds. Neurological:      Mental Status: He is alert and oriented to person, place, and time.    Psychiatric:         Mood and Affect: Mood normal.          [facility], [address] [facility]

## 2023-08-12 DIAGNOSIS — J44.1 CHRONIC OBSTRUCTIVE PULMONARY DISEASE WITH ACUTE EXACERBATION (HCC): ICD-10-CM

## 2023-08-12 RX ORDER — ALBUTEROL SULFATE 90 UG/1
2 AEROSOL, METERED RESPIRATORY (INHALATION) EVERY 6 HOURS PRN
Qty: 9 G | Refills: 0 | Status: SHIPPED | OUTPATIENT
Start: 2023-08-12

## 2023-08-14 ENCOUNTER — TELEPHONE (OUTPATIENT)
Dept: NEPHROLOGY | Facility: CLINIC | Age: 58
End: 2023-08-14

## 2023-08-14 DIAGNOSIS — I10 HYPERTENSION, UNSPECIFIED TYPE: Primary | ICD-10-CM

## 2023-08-14 LAB — LEVETIRACETAM SERPL-MCNC: 55.8 UG/ML (ref 10–40)

## 2023-08-15 ENCOUNTER — HOSPITAL ENCOUNTER (OUTPATIENT)
Dept: MRI IMAGING | Facility: HOSPITAL | Age: 58
Discharge: HOME | End: 2023-08-15
Payer: COMMERCIAL

## 2023-08-15 DIAGNOSIS — F51.04 PSYCHOPHYSIOLOGICAL INSOMNIA: ICD-10-CM

## 2023-08-15 DIAGNOSIS — G40.909 SEIZURE DISORDER (HCC): ICD-10-CM

## 2023-08-15 DIAGNOSIS — K86.3 PSEUDOCYST OF PANCREAS: ICD-10-CM

## 2023-08-15 PROCEDURE — 74183 MRI ABD W/O CNTR FLWD CNTR: CPT

## 2023-08-15 PROCEDURE — A9585 GADOBUTROL INJECTION: HCPCS | Performed by: PHYSICIAN ASSISTANT

## 2023-08-15 PROCEDURE — G1004 CDSM NDSC: HCPCS

## 2023-08-15 RX ORDER — GADOBUTROL 604.72 MG/ML
6 INJECTION INTRAVENOUS
Status: COMPLETED | OUTPATIENT
Start: 2023-08-15 | End: 2023-08-15

## 2023-08-15 RX ORDER — DOXEPIN HYDROCHLORIDE 50 MG/1
50 CAPSULE ORAL
Qty: 30 CAPSULE | Refills: 0 | OUTPATIENT
Start: 2023-08-15

## 2023-08-15 RX ORDER — LEVETIRACETAM 750 MG/1
750 TABLET ORAL EVERY 12 HOURS
Qty: 60 TABLET | Refills: 1 | Status: SHIPPED | OUTPATIENT
Start: 2023-08-15

## 2023-08-15 RX ADMIN — GADOBUTROL 6 ML: 604.72 INJECTION INTRAVENOUS at 08:43

## 2023-08-15 NOTE — TELEPHONE ENCOUNTER
----- Message from [address] sent at 8/15/2023  9:38 AM EDT -----  Keppra levels are elevated. Reduce dose to 750 mg every 12 hours. Repeat level in 1 month. Lab should be drawn in morning prior to patient taking AM dose. Thanks!

## 2023-08-18 ENCOUNTER — TELEPHONE (OUTPATIENT)
Dept: GASTROENTEROLOGY | Facility: MEDICAL CENTER | Age: 58
End: 2023-08-18

## 2023-08-18 ENCOUNTER — APPOINTMENT (OUTPATIENT)
Dept: LAB | Facility: MEDICAL CENTER | Age: 58
End: 2023-08-18
Payer: COMMERCIAL

## 2023-08-18 ENCOUNTER — OFFICE VISIT (OUTPATIENT)
Dept: ENDOCRINOLOGY | Facility: CLINIC | Age: 58
End: 2023-08-18
Payer: COMMERCIAL

## 2023-08-18 VITALS
SYSTOLIC BLOOD PRESSURE: 138 MMHG | BODY MASS INDEX: 22.2 KG/M2 | HEIGHT: 64 IN | DIASTOLIC BLOOD PRESSURE: 64 MMHG | WEIGHT: 130 LBS

## 2023-08-18 DIAGNOSIS — K70.30 ALCOHOLIC CIRRHOSIS OF LIVER WITHOUT ASCITES (HCC): ICD-10-CM

## 2023-08-18 DIAGNOSIS — I10 HYPERTENSION, UNSPECIFIED TYPE: ICD-10-CM

## 2023-08-18 DIAGNOSIS — E83.42 HYPOMAGNESEMIA: ICD-10-CM

## 2023-08-18 DIAGNOSIS — Z72.0 TOBACCO ABUSE: ICD-10-CM

## 2023-08-18 DIAGNOSIS — M80.00XS OSTEOPOROSIS WITH CURRENT PATHOLOGICAL FRACTURE, UNSPECIFIED OSTEOPOROSIS TYPE, SEQUELA: Primary | ICD-10-CM

## 2023-08-18 DIAGNOSIS — Z86.010 HISTORY OF COLON POLYPS: ICD-10-CM

## 2023-08-18 LAB
ANION GAP SERPL CALCULATED.3IONS-SCNC: 11 MMOL/L
BUN SERPL-MCNC: 8 MG/DL (ref 5–25)
CALCIUM SERPL-MCNC: 8.8 MG/DL (ref 8.3–10.1)
CHLORIDE SERPL-SCNC: 96 MMOL/L (ref 96–108)
CO2 SERPL-SCNC: 20 MMOL/L (ref 21–32)
CREAT SERPL-MCNC: 0.73 MG/DL (ref 0.6–1.3)
GFR SERPL CREATININE-BSD FRML MDRD: 102 ML/MIN/1.73SQ M
GLUCOSE P FAST SERPL-MCNC: 104 MG/DL (ref 65–99)
INR PPP: 1.04 (ref 0.84–1.19)
POTASSIUM SERPL-SCNC: 4.8 MMOL/L (ref 3.5–5.3)
PROTHROMBIN TIME: 13.8 SECONDS (ref 11.6–14.5)
SODIUM SERPL-SCNC: 127 MMOL/L (ref 135–147)

## 2023-08-18 PROCEDURE — 80048 BASIC METABOLIC PNL TOTAL CA: CPT

## 2023-08-18 PROCEDURE — 99406 BEHAV CHNG SMOKING 3-10 MIN: CPT | Performed by: PHYSICIAN ASSISTANT

## 2023-08-18 PROCEDURE — 85610 PROTHROMBIN TIME: CPT

## 2023-08-18 PROCEDURE — 99214 OFFICE O/P EST MOD 30 MIN: CPT | Performed by: PHYSICIAN ASSISTANT

## 2023-08-18 PROCEDURE — 36415 COLL VENOUS BLD VENIPUNCTURE: CPT

## 2023-08-18 RX ORDER — PHENOL 1.4 %
600 AEROSOL, SPRAY (ML) MUCOUS MEMBRANE DAILY
Qty: 90 TABLET | Refills: 3 | Status: SHIPPED | OUTPATIENT
Start: 2023-08-18

## 2023-08-18 RX ORDER — VARENICLINE TARTRATE 25 MG
KIT ORAL
Qty: 53 EACH | Refills: 0 | Status: SHIPPED | OUTPATIENT
Start: 2023-08-18 | End: 2023-09-24

## 2023-08-18 NOTE — TELEPHONE ENCOUNTER
Spoke to patient confirming upcoming procedure. Patient was instructed to call [phone] if they have any questions or concerns about the prep instructions or if they need to change or cancel the procedure.

## 2023-08-18 NOTE — PATIENT INSTRUCTIONS
Goal calcium intake is 1000mg daily. We will start 650mg through supplementation (prescription pill), but look to achieve at least 350mg in dietary sources.  Some suggestions for calcium rich foods are below:     Calcium: Nutrient-densea Food and Beverage Sources, Amounts of Calcium and Energy per Standard Portion    FOODbc STANDARD PORTIONd CALORIES CALCIUM (mg)  Dairy and Fortified Soy Alternatives  Yogurt, plain, nonfat 8 ounces 137 488  Yogurt, plain, low fat 8 ounces 154 448  Kefir, plain, low fat 1 cup 104 317  Milk, low fat (1 %) 1 cup 102 305  Soy beverage (soy milk), unsweetened 1 cup 80 301  Yogurt, soy, plain 8 ounces 150 300  Milk, fat free (skim) 1 cup 83 298  Buttermilk, low fat 1 cup 98 284  Yogurt, Greek, plain, low fat 8 ounces 166 261  Yogurt, Greek, plain, nonfat 8 ounces 134 250  Cheese, reduced, low, or fat free (various) 1 1/2 ounces ~ ~115-485  Vegetables  Lambsquarters, cooked 1 cup 58 464  Tanya, cooked 1 cup 37 428  Mustard spinach, cooked 1 cup 29 284  Amaranth leaves, cooked 1 cup 28 276  Daquan greens, cooked 1 cup 63 268  Spinach, cooked 1 cup 41 245  Nopales, cooked 1 cup 22 244  Taro root (dasheen or yautia), cooked 1 cup 60 204  Turnip greens, cooked 1 cup 29 197  Bok blanca, cooked 1 cup 24 185  Jute, cooked 1 cup 32 184  Kale, cooked 1 cup 43 177  Mustard greens, cooked 1 cup 36 165  Beet greens, cooked 1 cup 39 164  Pablo ortega, cooked 1 cup 20 158  Dandelion greens, cooked 1 cup 35 147  Protein Foodse  Tofu, raw, regular, prepared with calcium sulfate 1/2 cup 94 434  Sardines, canned 3 ounces 177 325  [place], canned, solids with bone 3 ounces 118 181  Tahini (sesame butter or paste) 1 tablespoon 94 154  Fruit  Grapefruit juice, 100%, fortified 1 cup 94 350  Orange juice, 100%, fortified 1 cup 117 349  Other Sources  Elaine beverage (almond milk), unsweetened 1 cup 36 442  Rice beverage (rice milk), unsweetened 1 cup 113 283  a All foods listed are assumed to be in nutrient-dense forms; lean or low-fat and prepared with minimal added sugars, saturated fat, or sodium. b Some fortified foods and beverages are included. Other fortified options may exist on the market, but not all fortified foods are nutrient-dense. For example, some foods with added sugars may be fortified and would not be examples in the lists provided here.    c Some foods or beverages are not appropriate for all ages, particularly young children for whom some foods could be a choking hazard. d Portions listed are not recommended serving sizes. Two lists--in ‘standard’ and ‘smaller’ portions--are provided for each dietary component. Standard portions provide at least 130 mg of calcium. Smaller portions are generally one half of a standard portion.    e Seafood varieties include choices from the FDA/EPA joint “Advice About Eating Fish,” available at FDA.gov/fishadvice and EPA.gov/fishadvice from the [facility] list. Varieties from the [facility] list that contain even lower methylmercury include: flatfish (e.g., flounder), salmon, tilapia, shrimp, catfish, crab, trout, annia, oysters, sardines, squid, pollock, anchovies, crawfish, mullet, scallops, derek, clams, shad, and [place] mackerel. Data Source: U.S. Department of Agriculture, [address], 2019. fdc.nal.usda.gov.

## 2023-08-18 NOTE — PROGRESS NOTES
Yuly Xiong 63 y.o. male MRN: [id number]  Encounter: [id number]      Assessment/Plan   Problem List Items Addressed This Visit        Musculoskeletal and Integument    Osteoporosis with current pathological fracture - Primary     · Tolerating Reclast very well, will continue annual dosing via infusion center [facility]). · Continue routine CMP monitoring prior to injections. · Encouraged continued Vitamin D supplementation, routine Vitamin D surveillance with labs. · Although ideal calcium intake in men with osteoporosis is unclear, suggested target is 1000mg daily. Recommend OsCal 650mg daily in addition to his dietary calcium intake. Provided list of calcium rich foods in after visit summary. · Will plan for repeat DXA scan at 2 year interval (due 10/24). · Of note, patient awaiting possible cervical spine surgery, but surgeon seeking improvement of osteoporosis prior to scheduling. Relevant Medications    calcium carbonate (OS-KORI) 600 MG tablet       Other    Tobacco abuse     · Ongoing tobacco abuse, previously up to 4 PPD, has significantly cut back since then. · Discussed possible smoking cessation aids including nicoderm patch, gum, which he has been unsuccessful with in past.   · He is interested in trial of Chantix, will prescribe starter pack today. He is aware of possible side effects to include mood disorder, depression, abnormal dreams. Encouraged him to discontinue use should this or other adverse effects occur. · Wellbutrin could also be considered to help control cravings with tobacco use disorder. With patient's history of cirrhosis, this would need to be used cautiously, low dose. · Total time spent discussing smoking cessation including adverse outcomes associated with tobacco abuse, was 5 minutes.          Relevant Medications    varenicline 0.5 MG X 11 & 1 MG X 42 tablet therapy pack       CC:   osteoporosis    History of Present Illness     HPI:  Henri uQiroz is a 63year old male here for routine follow up for osteoporosis. His osteoporosis is complicated by history of multiple fractures. He also carries a diagnosis of tobacco abuse, cirrhosis due to former alcoholism. He received initial Reclast infusion 10/13/22. This was well tolerated, no adverse effects reported. He  has next infusion scheduled for 10/16/23. Mallory Kowalski does not usually consume a lot of diary products, but does eat cheese at least once a day. Also ice cream once a week. He is currently taking Vitamin D 50,000 units once a week, also 1000 units daily. He does not currently take any calcium supplementation. Regarding tobacco use, he has significantly cut back, but still smoking. He does have interest in quitting today. Review of Systems   Constitutional: Negative for chills and fever. HENT: Negative for ear pain and sore throat. Eyes: Negative for pain and visual disturbance. Respiratory: Negative for cough and shortness of breath. Cardiovascular: Negative for chest pain and palpitations. Gastrointestinal: Negative for abdominal pain and vomiting. Genitourinary: Negative for dysuria and hematuria. Musculoskeletal: Negative for arthralgias and back pain. Skin: Negative for color change and rash. Neurological: Negative for seizures and syncope. All other systems reviewed and are negative.       Historical Information   Past Medical History:   Diagnosis Date   • Alcohol abuse    • Traore esophagus    • Bowel obstruction (HCC)    • Bowel perforation (HCC)    • Cardiac disease    • Continuous chronic alcoholism ([address]) 10/5/2017   • COPD (chronic obstructive pulmonary disease) (HCC)    • History of shoulder surgery     Right shoulder   • History of transfusion    • Hx of cervical spine surgery    • Hypertension    • Incisional hernia 10/8/2017   • MI, old    • Mitral regurgitation    • Psychiatric disorder    • Seizures ([address])      Past Surgical History:   Procedure Laterality Date   • APPENDECTOMY • BACK SURGERY     • CHOLECYSTECTOMY     • ESOPHAGOGASTRODUODENOSCOPY N/A 11/28/2016    Procedure: ESOPHAGOGASTRODUODENOSCOPY (EGD); Surgeon: Philipp Sidhu MD;  Location: [facility];   Service:    • GALLBLADDER SURGERY     • LAPAROTOMY N/A 10/25/2016    Procedure: LAPAROTOMY EXPLORATORY;  Surgeon: Trey Hurtado MD;  Location: [facility];  Service:    • MOUTH SURGERY     • PERCUTANEOUS PINNING FEMORAL NECK FRACTURE     • SHOULDER SURGERY Right    • SHOULDER SURGERY     • SMALL INTESTINE SURGERY     • STOMACH SURGERY      bal surgery     Social History   Social History     Substance and Sexual Activity   Alcohol Use Yes   • Alcohol/week: 42.0 standard drinks of alcohol   • Types: 42 Cans of beer per week    Comment: a six pack a day     Social History     Substance and Sexual Activity   Drug Use No     Social History     Tobacco Use   Smoking Status Every Day   • Packs/day: 3.00   • Years: 35.00   • Total pack years: 105.00   • Types: Cigarettes   Smokeless Tobacco Never     Family History:   Family History   Problem Relation Age of Onset   • Breast cancer Mother    • Prostate cancer Father    • Skin cancer Brother        Meds/Allergies   Current Outpatient Medications   Medication Sig Dispense Refill   • albuterol (PROVENTIL HFA,VENTOLIN HFA) 90 mcg/act inhaler INHALE 2 PUFFS BY MOUTH EVERY 6 HOURS AS NEEDED FOR WHEEZING 9 g 0   • Cholecalciferol 25 MCG (1000 UT) tablet Take 1 tablet (1,000 Units total) by mouth daily 30 tablet 2   • cyanocobalamin (VITAMIN B-12) 100 mcg tablet Take 1 tablet (100 mcg total) by mouth daily 30 tablet 5   • docusate sodium (COLACE) 100 mg capsule Take 1 capsule (100 mg total) by mouth 2 (two) times a day as needed for constipation 30 capsule 0   • doxepin (SINEquan) 50 mg capsule Take 1 capsule (50 mg total) by mouth daily at bedtime 30 capsule 0   • ergocalciferol (VITAMIN D2) 50,000 units Take 1 capsule (50,000 Units total) by mouth once a week 13 capsule 1   • folic acid (FOLVITE) 1 mg tablet Take 1 tablet (1 mg total) by mouth daily 30 tablet 2   • hydrOXYzine HCL (ATARAX) 50 mg tablet      • levETIRAcetam (KEPPRA) 750 mg tablet Take 1 tablet (750 mg total) by mouth every 12 (twelve) hours 60 tablet 1   • lisinopril (ZESTRIL) 2.5 mg tablet Take 1 tablet (2.5 mg total) by mouth daily 90 tablet 2   • Multiple Vitamin (TAB-A-BONI PO) Take 1 tablet by mouth daily     • omeprazole (PriLOSEC) 20 mg delayed release capsule Take 1 capsule (20 mg total) by mouth daily 30 capsule 11   • pregabalin (LYRICA) 50 mg capsule Take 1 capsule (50 mg total) by mouth 3 (three) times a day 90 capsule 1   • sodium chloride 1 g tablet Take 2 tablets (2 g total) by mouth 3 (three) times a day with meals 240 tablet 3   • thiamine (VITAMIN B1) 100 mg tablet Take 1 tablet (100 mg total) by mouth daily 90 tablet 1   • ALPRAZolam (XANAX) 0.5 mg tablet Take 1 tablet (0.5 mg total) by mouth daily at bedtime as needed for anxiety (Patient not taking: Reported on 8/18/2023) 30 tablet 0   • Diclofenac Sodium (VOLTAREN) 1 % Apply 2 g topically 4 (four) times a day 100 g 1   • diclofenac sodium (VOLTAREN) 50 mg EC tablet Take 1 tablet (50 mg total) by mouth 2 (two) times a day (Patient not taking: Reported on 8/18/2023) 60 tablet 1   • polyethylene glycol-electrolytes (TriLyte) 4000 mL solution Take 4,000 mL by mouth once for 1 dose Take 4000 mL by mouth once for 1 dose. Use as directed 4000 mL 0     No current facility-administered medications for this visit. No Known Allergies    Objective   Vitals: Blood pressure 138/64, height 5' 4" (1.626 m), weight 59 kg (130 lb). Physical Exam  Vitals reviewed. Constitutional:       Appearance: He is ill-appearing (chronically ill appearing.). HENT:      Head: Normocephalic. Mouth/Throat:      Mouth: Mucous membranes are moist.   Cardiovascular:      Rate and Rhythm: Normal rate and regular rhythm.    Pulmonary:      Effort: Pulmonary effort is normal. Breath sounds: Normal breath sounds. Musculoskeletal:      Right lower leg: No edema. Left lower leg: No edema. Comments: Tentative, slow gait in setting of chronic pain, prior spine surgery. Skin:     General: Skin is warm and dry. Neurological:      Mental Status: He is alert and oriented to person, place, and time. Psychiatric:         Mood and Affect: Mood normal.         The history was obtained from the review of the chart, patient. Lab Results:   Lab Results   Component Value Date/Time    Potassium 3.9 08/11/2023 08:44 AM    Potassium 4.2 07/05/2023 08:49 AM    Potassium 4.6 05/10/2023 11:33 AM    Chloride 99 08/11/2023 08:44 AM    Chloride 100 07/05/2023 08:49 AM    Chloride 92 (L) 05/10/2023 11:33 AM    CO2 27 08/11/2023 08:44 AM    CO2 28 07/05/2023 08:49 AM    CO2 24 05/10/2023 11:33 AM    BUN 7 08/11/2023 08:44 AM    BUN 10 07/05/2023 08:49 AM    BUN 5 05/10/2023 11:33 AM    Creatinine 0.79 08/11/2023 08:44 AM    Creatinine 0.75 07/05/2023 08:49 AM    Creatinine 0.64 05/10/2023 11:33 AM    Glucose, Fasting 84 08/11/2023 08:44 AM    Glucose, Fasting 77 07/05/2023 08:49 AM    Glucose, Fasting 82 05/10/2023 11:33 AM    Calcium 9.0 08/11/2023 08:44 AM    Calcium 9.3 07/05/2023 08:49 AM    Calcium 9.0 05/10/2023 11:33 AM    eGFR 99 08/11/2023 08:44 AM    eGFR 102 07/05/2023 08:49 AM    eGFR 109 05/10/2023 11:33 AM    TSH 3RD GENERATON 3.028 02/26/2023 06:17 AM    TSH 3RD GENERATON 1.678 02/01/2023 06:26 AM    PTH 28.6 01/30/2023 10:35 AM    .0 (H) 10/19/2022 07:44 AM    PTH 44.7 09/15/2022 07:49 AM    Vit D, 25-Hydroxy 64.5 02/26/2023 06:17 AM    Vit D, 25-Hydroxy 68.8 10/19/2022 07:44 AM    Vit D, 25-Hydroxy 36.3 09/15/2022 07:49 AM         Imaging Studies:         Results for orders placed during the hospital encounter of 10/04/22    DXA bone density spine hip and pelvis    Impression  1. Osteoporosis.     3.  The 10 year risk of hip fracture is 14% with the 10 year risk of major osteoporotic fracture being 22% as calculated by the [employer]/WHO fracture risk assessment tool (FRAX). 3.  The current NOF guidelines recommend treating patients with a T-score of -2.5 or less in the lumbar spine or hips, or in post-menopausal women and men over the age of 48 with low bone mass (osteopenia) and a FRAX 10 year risk score of >3% for hip  fracture and/or >20% for major osteoporotic fracture. 4.  The NOF recommends follow-up DXA in 1-2 years after initiating therapy for osteoporosis and every 2 years thereafter. More frequent evaluation is appropriate for patients with conditions associated with rapid bone loss, such as glucocorticoid  therapy. The interval between DXA screenings may be longer for individuals without major risk factors and initial T-score in the normal or upper low bone mass range. The FRAX algorithm has certain limitations:  -FRAX has not been validated in patients currently or previously treated with pharmacotherapy for osteoporosis. In such patients, clinical judgment must be exercised in interpreting FRAX scores. -Prior hip, vertebral and humeral fragility fractures appear to confer greater risk of subsequent fracture than fractures at other sites (this is especially true for individuals with severe vertebral fractures), but quantification of this incremental  risk is not possible with FRAX. -FRAX underestimates fracture risk in patients with history of multiple fragility fractures. -FRAX may underestimate fracture risk in patients with history of frequent falls.  -It is not appropriate to use FRAX to monitor treatment response.       WHO CLASSIFICATION:  Normal (a T-score of -1.0 or higher)  Low bone mineral density (a T-score of less than -1.0 but higher than -2.5)  Osteoporosis (a T-score of -2.5 or less)  Severe osteoporosis (a T-score of -2.5 or less with a fragility fracture)            Workstation performed: [id number]            Portions of the record may have been created with voice recognition software. Occasional wrong word or "sound a like" substitutions may have occurred due to the inherent limitations of voice recognition software. Read the chart carefully and recognize, using context, where substitutions have occurred.

## 2023-08-21 ENCOUNTER — ANESTHESIA EVENT (OUTPATIENT)
Dept: ANESTHESIOLOGY | Facility: HOSPITAL | Age: 58
End: 2023-08-21

## 2023-08-21 ENCOUNTER — TELEPHONE (OUTPATIENT)
Dept: NEPHROLOGY | Facility: CLINIC | Age: 58
End: 2023-08-21

## 2023-08-21 ENCOUNTER — ANESTHESIA (OUTPATIENT)
Dept: ANESTHESIOLOGY | Facility: HOSPITAL | Age: 58
End: 2023-08-21

## 2023-08-21 DIAGNOSIS — K70.30 ALCOHOLIC CIRRHOSIS OF LIVER WITHOUT ASCITES (HCC): Primary | ICD-10-CM

## 2023-08-21 NOTE — ANESTHESIA PREPROCEDURE EVALUATION
Procedure:  PRE-OP ONLY    Relevant Problems   CARDIO   (+) Aortic ectasia (HCC)   (+) Essential hypertension   (+) Left anterior fascicular block      GI/HEPATIC   (+) Abnormality of pancreatic duct   (+) Acute on chronic pancreatitis (HCC)   (+) Alcoholic cirrhosis of liver without ascites (HCC)   (+) Alcoholic hepatitis without ascites   (+) Duodenal ulcer   (+) Gastroesophageal reflux disease with esophagitis without hemorrhage   (+) Hepatomegaly   (+) Ileus (HCC)   (+) Pancreatic pseudocyst      HEMATOLOGY   (+) Chronic anemia   (+) Coagulopathy (HCC)   (+) Dietary folate deficiency anemia   (+) Iron deficiency anemia due to chronic blood loss   (+) Microcytic anemia   (+) Pancytopenia (HCC)   (+) Thrombocytopenia (HCC)      NEURO/PSYCH   (+) Cervical disc disorder with myelopathy   (+) Depression   (+) Seizure disorder (HCC)      PULMONARY   (+) COPD (chronic obstructive pulmonary disease) (HCC)      Other   (+) Lesion of spleen      Normal sinus rhythm  Left anterior fascicular block  Cannot rule out Anteroseptal infarct , age undetermined  Abnormal ECG  Physical Exam    Airway    Mallampati score: II  TM Distance: >3 FB  Neck ROM: full     Dental   No notable dental hx     Cardiovascular  Cardiovascular exam normal    Pulmonary  Pulmonary exam normal     Other Findings        Anesthesia Plan  ASA Score- 3     Anesthesia Type- IV sedation with anesthesia with ASA Monitors. Additional Monitors:   Airway Plan:           Plan Factors-Exercise tolerance (METS): >4 METS. Chart reviewed. EKG reviewed. Imaging results reviewed. Existing labs reviewed. Patient summary reviewed. Patient is not a current smoker. Patient not instructed to abstain from smoking on day of procedure. Patient did not smoke on day of surgery. Obstructive sleep apnea risk education given perioperatively. Induction- intravenous. Postoperative Plan-     Informed Consent- Anesthetic plan and risks discussed with patient.   I personally reviewed this patient with the CRNA. Discussed and agreed on the Anesthesia Plan with the CRNA. Victor Hugo Prado

## 2023-08-22 ENCOUNTER — ANESTHESIA (OUTPATIENT)
Dept: PERIOP | Facility: HOSPITAL | Age: 58
End: 2023-08-22

## 2023-08-22 ENCOUNTER — ANESTHESIA EVENT (OUTPATIENT)
Dept: PERIOP | Facility: HOSPITAL | Age: 58
End: 2023-08-22

## 2023-08-22 ENCOUNTER — HOSPITAL ENCOUNTER (OUTPATIENT)
Dept: PERIOP | Facility: HOSPITAL | Age: 58
Setting detail: OUTPATIENT SURGERY
Discharge: HOME | End: 2023-08-22
Attending: INTERNAL MEDICINE
Payer: COMMERCIAL

## 2023-08-22 VITALS
HEART RATE: 82 BPM | OXYGEN SATURATION: 95 % | SYSTOLIC BLOOD PRESSURE: 119 MMHG | WEIGHT: 130 LBS | HEIGHT: 64 IN | DIASTOLIC BLOOD PRESSURE: 98 MMHG | BODY MASS INDEX: 22.2 KG/M2 | TEMPERATURE: 97.1 F | RESPIRATION RATE: 20 BRPM

## 2023-08-22 DIAGNOSIS — Z86.010 HISTORY OF COLON POLYPS: ICD-10-CM

## 2023-08-22 DIAGNOSIS — K70.30 ALCOHOLIC CIRRHOSIS OF LIVER WITHOUT ASCITES (HCC): ICD-10-CM

## 2023-08-22 PROCEDURE — 88305 TISSUE EXAM BY PATHOLOGIST: CPT | Performed by: PATHOLOGY

## 2023-08-22 PROCEDURE — 88342 IMHCHEM/IMCYTCHM 1ST ANTB: CPT | Performed by: PATHOLOGY

## 2023-08-22 PROCEDURE — 88360 TUMOR IMMUNOHISTOCHEM/MANUAL: CPT | Performed by: PATHOLOGY

## 2023-08-22 RX ORDER — ONDANSETRON 2 MG/ML
4 INJECTION INTRAMUSCULAR; INTRAVENOUS ONCE AS NEEDED
Status: DISCONTINUED | OUTPATIENT
Start: 2023-08-22 | End: 2023-08-26 | Stop reason: HOSPADM

## 2023-08-22 RX ORDER — LIDOCAINE HYDROCHLORIDE 20 MG/ML
INJECTION, SOLUTION EPIDURAL; INFILTRATION; INTRACAUDAL; PERINEURAL AS NEEDED
Status: DISCONTINUED | OUTPATIENT
Start: 2023-08-22 | End: 2023-08-22

## 2023-08-22 RX ORDER — SODIUM CHLORIDE, SODIUM LACTATE, POTASSIUM CHLORIDE, CALCIUM CHLORIDE 600; 310; 30; 20 MG/100ML; MG/100ML; MG/100ML; MG/100ML
INJECTION, SOLUTION INTRAVENOUS CONTINUOUS PRN
Status: DISCONTINUED | OUTPATIENT
Start: 2023-08-22 | End: 2023-08-22

## 2023-08-22 RX ORDER — ALBUTEROL SULFATE 2.5 MG/3ML
2.5 SOLUTION RESPIRATORY (INHALATION) ONCE AS NEEDED
Status: DISCONTINUED | OUTPATIENT
Start: 2023-08-22 | End: 2023-08-26 | Stop reason: HOSPADM

## 2023-08-22 RX ORDER — SODIUM CHLORIDE, SODIUM LACTATE, POTASSIUM CHLORIDE, CALCIUM CHLORIDE 600; 310; 30; 20 MG/100ML; MG/100ML; MG/100ML; MG/100ML
125 INJECTION, SOLUTION INTRAVENOUS CONTINUOUS
Status: DISCONTINUED | OUTPATIENT
Start: 2023-08-22 | End: 2023-08-26 | Stop reason: HOSPADM

## 2023-08-22 RX ORDER — PHENYLEPHRINE HCL IN 0.9% NACL 1 MG/10 ML
SYRINGE (ML) INTRAVENOUS AS NEEDED
Status: DISCONTINUED | OUTPATIENT
Start: 2023-08-22 | End: 2023-08-22

## 2023-08-22 RX ORDER — PROPOFOL 10 MG/ML
INJECTION, EMULSION INTRAVENOUS AS NEEDED
Status: DISCONTINUED | OUTPATIENT
Start: 2023-08-22 | End: 2023-08-22

## 2023-08-22 RX ADMIN — LIDOCAINE HYDROCHLORIDE 80 MG: 20 INJECTION, SOLUTION EPIDURAL; INFILTRATION; INTRACAUDAL; PERINEURAL at 07:41

## 2023-08-22 RX ADMIN — SODIUM CHLORIDE, SODIUM LACTATE, POTASSIUM CHLORIDE, AND CALCIUM CHLORIDE: .6; .31; .03; .02 INJECTION, SOLUTION INTRAVENOUS at 07:35

## 2023-08-22 RX ADMIN — Medication 100 MCG: at 08:12

## 2023-08-22 RX ADMIN — PROPOFOL 80 MG: 10 INJECTION, EMULSION INTRAVENOUS at 07:41

## 2023-08-22 RX ADMIN — PROPOFOL 50 MG: 10 INJECTION, EMULSION INTRAVENOUS at 07:50

## 2023-08-22 RX ADMIN — PROPOFOL 140 MCG/KG/MIN: 10 INJECTION, EMULSION INTRAVENOUS at 07:53

## 2023-08-22 RX ADMIN — PROPOFOL 50 MG: 10 INJECTION, EMULSION INTRAVENOUS at 07:44

## 2023-08-22 RX ADMIN — Medication 100 MCG: at 08:05

## 2023-08-22 RX ADMIN — PROPOFOL 40 MG: 10 INJECTION, EMULSION INTRAVENOUS at 07:42

## 2023-08-22 RX ADMIN — Medication 100 MCG: at 08:09

## 2023-08-22 RX ADMIN — PROPOFOL 50 MG: 10 INJECTION, EMULSION INTRAVENOUS at 07:47

## 2023-08-22 NOTE — ANESTHESIA POSTPROCEDURE EVALUATION
Post-Op Assessment Note    CV Status:  Stable    Pain management: adequate     Mental Status:  Sleepy   Hydration Status:  Euvolemic   PONV Controlled:  Controlled   Airway Patency:  Patent      Post Op Vitals Reviewed: Yes      Staff: CRNA         No notable events documented.     BP   111/71   Temp   97.1   Pulse  91   Resp   18   SpO2   98%

## 2023-08-22 NOTE — ANESTHESIA PREPROCEDURE EVALUATION
Procedure:  COLONOSCOPY  EGD    Relevant Problems   CARDIO   (+) Aortic ectasia (HCC)   (+) Essential hypertension   (+) Left anterior fascicular block      GI/HEPATIC   (+) Abnormality of pancreatic duct   (+) Acute on chronic pancreatitis (HCC)   (+) Alcoholic cirrhosis of liver without ascites (HCC)   (+) Alcoholic hepatitis without ascites   (+) Duodenal ulcer   (+) Gastroesophageal reflux disease with esophagitis without hemorrhage   (+) Hepatomegaly   (+) Ileus (HCC)   (+) Pancreatic pseudocyst      HEMATOLOGY   (+) Chronic anemia   (+) Coagulopathy (HCC)   (+) Dietary folate deficiency anemia   (+) Iron deficiency anemia due to chronic blood loss   (+) Microcytic anemia   (+) Pancytopenia (HCC)   (+) Thrombocytopenia (HCC)      NEURO/PSYCH   (+) Cervical disc disorder with myelopathy   (+) Depression   (+) Seizure disorder (HCC)      PULMONARY   (+) COPD (chronic obstructive pulmonary disease) (HCC)      Other   (+) Lesion of spleen      Normal sinus rhythm  Left anterior fascicular block  Cannot rule out Anteroseptal infarct , age undetermined  Abnormal ECG  Physical Exam    Airway    Mallampati score: II  TM Distance: >3 FB  Neck ROM: full     Dental   No notable dental hx     Cardiovascular  Cardiovascular exam normal    Pulmonary  Pulmonary exam normal     Other Findings        Anesthesia Plan  ASA Score- 3     Anesthesia Type- IV sedation with anesthesia with ASA Monitors. Additional Monitors:   Airway Plan:           Plan Factors-Exercise tolerance (METS): >4 METS. Chart reviewed. EKG reviewed. Imaging results reviewed. Existing labs reviewed. Patient summary reviewed. Patient is not a current smoker. Patient not instructed to abstain from smoking on day of procedure. Patient did not smoke on day of surgery. Obstructive sleep apnea risk education given perioperatively. Induction- intravenous.     Postoperative Plan-     Informed Consent- Anesthetic plan and risks discussed with patient. I personally reviewed this patient with the CRNA. Discussed and agreed on the Anesthesia Plan with the CRNA. Isidra Martinez

## 2023-08-22 NOTE — ASSESSMENT & PLAN NOTE
· Ongoing tobacco abuse, previously up to 4 PPD, has significantly cut back since then. · Discussed possible smoking cessation aids including nicoderm patch, gum, which he has been unsuccessful with in past.   · He is interested in trial of Chantix, will prescribe starter pack today. He is aware of possible side effects to include mood disorder, depression, abnormal dreams. Encouraged him to discontinue use should this or other adverse effects occur. · Wellbutrin could also be considered to help control cravings with tobacco use disorder. With patient's history of cirrhosis, this would need to be used cautiously, low dose. · Total time spent discussing smoking cessation including adverse outcomes associated with tobacco abuse, was 5 minutes.

## 2023-08-22 NOTE — H&P
[facility]  History & Physical     PATIENT INFO     Name: Alyssa Duran  YOB: 1965   Age: 63 y.o. Sex: male   MRN: [id number]     HISTORY OF PRESENT ILLNESS     Alyssa Duran is a 63y.o. year old male who presents for EGD and surveillance colonoscopy for variceal screening, history of Traore's esophagus, history of colon polyps. Last colonoscopy in 2020. Not on antiplatelets or anticoagulants. REVIEW OF SYSTEMS     Per the HPI, and otherwise unremarkable. Historical Information   Past Medical History:   Diagnosis Date   • Alcohol abuse    • Traore esophagus    • Bowel obstruction (HCC)    • Bowel perforation (HCC)    • Cardiac disease    • Continuous chronic alcoholism ([address]) 10/5/2017   • COPD (chronic obstructive pulmonary disease) (HCC)    • History of shoulder surgery     Right shoulder   • History of transfusion    • Hx of cervical spine surgery    • Hypertension    • Incisional hernia 10/8/2017   • MI, old    • Mitral regurgitation    • Psychiatric disorder    • Seizures ([address])      Past Surgical History:   Procedure Laterality Date   • APPENDECTOMY     • BACK SURGERY     • CHOLECYSTECTOMY     • ESOPHAGOGASTRODUODENOSCOPY N/A 11/28/2016    Procedure: ESOPHAGOGASTRODUODENOSCOPY (EGD); Surgeon: Trino Mcdaniel MD;  Location: [facility];   Service:    • GALLBLADDER SURGERY     • LAPAROTOMY N/A 10/25/2016    Procedure: LAPAROTOMY EXPLORATORY;  Surgeon: Itzel Teixeira MD;  Location: [facility];  Service:    • MOUTH SURGERY     • PERCUTANEOUS PINNING FEMORAL NECK FRACTURE     • SHOULDER SURGERY Right    • SHOULDER SURGERY     • SMALL INTESTINE SURGERY     • STOMACH SURGERY      bal surgery     Social History   Social History     Substance and Sexual Activity   Alcohol Use Not Currently   • Alcohol/week: 42.0 standard drinks of alcohol   • Types: 42 Cans of beer per week    Comment: a six pack a day     Social History     Substance and Sexual Activity   Drug Use No     Social History     Tobacco Use   Smoking Status Every Day   • Packs/day: 1.00   • Years: 35.00   • Total pack years: 35.00   • Types: Cigarettes   Smokeless Tobacco Never     Family History   Problem Relation Age of Onset   • Breast cancer Mother    • Prostate cancer Father    • Skin cancer Brother         MEDICATIONS & ALLERGIES     Current Outpatient Medications   Medication Instructions   • albuterol (PROVENTIL HFA,VENTOLIN HFA) 90 mcg/act inhaler 2 puffs, Inhalation, Every 6 hours PRN   • ALPRAZolam (XANAX) 0.5 mg, Oral, Daily at bedtime PRN   • calcium carbonate (OS-KORI) 600 mg, Oral, Daily   • Cholecalciferol 1,000 Units, Oral, Daily   • cyanocobalamin (VITAMIN B-12) 100 mcg, Oral, Daily   • Diclofenac Sodium (VOLTAREN) 2 g, Topical, 4 times daily   • diclofenac sodium (VOLTAREN) 50 mg, Oral, 2 times daily   • docusate sodium (COLACE) 100 mg, Oral, 2 times daily PRN   • doxepin (SINEQUAN) 50 mg, Oral, Daily at bedtime   • ergocalciferol (VITAMIN D2) 50,000 Units, Oral, Weekly   • folic acid (FOLVITE) 1 mg, Oral, Daily   • hydrOXYzine HCL (ATARAX) 50 mg tablet No dose, route, or frequency recorded. • levETIRAcetam (KEPPRA) 750 mg, Oral, Every 12 hours   • lisinopril (ZESTRIL) 2.5 mg, Oral, Daily   • Multiple Vitamin (TAB-A-BONI PO) 1 tablet, Oral, Daily   • omeprazole (PRILOSEC) 20 mg, Oral, Daily   • polyethylene glycol-electrolytes (TriLyte) 4000 mL solution 4,000 mL, Oral, Once, Take 4000 mL by mouth once for 1 dose. Use as directed   • pregabalin (LYRICA) 50 mg, Oral, 3 times daily   • sodium chloride 2 g, Oral, 3 times daily with meals   • thiamine (VITAMIN B1) 100 mg, Oral, Daily   • varenicline 0.5 MG X 11 & 1 MG X 42 tablet therapy pack Take 0.5 mg by mouth daily for 3 days, THEN 0.5 mg 2 (two) times a day for 4 days, THEN 1 mg 2 (two) times a day.      No Known Allergies     PHYSICAL EXAM      Objective   Blood pressure 114/78, pulse 96, temperature 98.6 °F (37 °C), temperature source Tympanic, resp. rate 20, height 5' 4" (1.626 m), weight 59 kg (130 lb), SpO2 95 %. Body mass index is 22.31 kg/m². General Appearance:   Alert, cooperative, no distress   Lungs:   Equal chest rise, respirations unlabored    Heart:   Regular rate and rhythm   Abdomen:   Soft, non-tender, non-distended; normal bowel sounds; no masses, no organomegaly    Extremities:   No edema       ASSESSMENT & PLAN     This is a 63y.o. year old male here for EGD and colonoscopy, and he is stable and optimized for his procedure. Edith Steward D.O. [id number] [facility]  Division of Gastroenterology & Hepatology  Available on [email or web address]    ** Please Note: This note is constructed using a voice recognition dictation system.  **

## 2023-08-22 NOTE — ASSESSMENT & PLAN NOTE
· Tolerating Reclast very well, will continue annual dosing via infusion center [facility]). · Continue routine CMP monitoring prior to injections. · Encouraged continued Vitamin D supplementation, routine Vitamin D surveillance with labs. · Although ideal calcium intake in men with osteoporosis is unclear, suggested target is 1000mg daily. Recommend OsCal 650mg daily in addition to his dietary calcium intake. Provided list of calcium rich foods in after visit summary. · Will plan for repeat DXA scan at 2 year interval (due 10/24). · Of note, patient awaiting possible cervical spine surgery, but surgeon seeking improvement of osteoporosis prior to scheduling.

## 2023-08-24 ENCOUNTER — CONSULT (OUTPATIENT)
Dept: NEUROLOGY | Facility: CLINIC | Age: 58
End: 2023-08-24
Payer: COMMERCIAL

## 2023-08-24 VITALS
TEMPERATURE: 98.3 F | OXYGEN SATURATION: 95 % | HEIGHT: 64 IN | DIASTOLIC BLOOD PRESSURE: 78 MMHG | HEART RATE: 88 BPM | SYSTOLIC BLOOD PRESSURE: 118 MMHG | BODY MASS INDEX: 22.2 KG/M2 | WEIGHT: 130 LBS

## 2023-08-24 DIAGNOSIS — G40.909 SEIZURE DISORDER (HCC): ICD-10-CM

## 2023-08-24 PROCEDURE — 99215 OFFICE O/P EST HI 40 MIN: CPT | Performed by: PSYCHIATRY & NEUROLOGY

## 2023-08-24 NOTE — PATIENT INSTRUCTIONS
Continue Keppra 750mg twice daily. Recheck Keppra (also known as Levetiracetam) level. Try to either go to the lab first thing in the morning to check level and then take morning dosage afterwards, or take your morning dosage as usual and go to the lab to have your level checked later in the day, so we can have a trough level of your Keppra blood level. Based on the level, we may consider changing your dosage of Keppra back to 1000mg twice daily. Complete Varenicline course. Varenicline does have a risk for seizures, especially using the first month of usage. Recommend continue alcohol abstinence. Discussed with patient no driving in the meantime. In 1 month after you have completed Varenicline, if you are still seizure-free with no loss of consciousness events, reach out to Dr. Amy Johnson to submit a form to [employer] as we understand you are interested in resuming driving.

## 2023-08-24 NOTE — PROGRESS NOTES
[facility]  Initial Consultation    Impression/Plan    Babita Grubbs is a 63YO R-handed M with history of seizure disorder, alcoholic cirrhosis, hyponatremia, SANDOVAL, depression, insomnia who presenting to [facility] as a new patient for evaluation and management of seizures. Per chart review and prior rEEG/vEEG findings, there does not appear to be any seizure-like activities witnessed by medical staff members and per history taking of patient of witnessed reports, it is unclear if patient truly has had seizures. It is uncertain whether or not patient truly experienced a post-ictal state. No definite diagnosis of epilepsy at this time. It is possible he may have experienced syncope with convulsions as patient reports LOC and seizure-like activity only with standing. Prior seizure-like activity levels appear to be in the setting of history of heavy alcohol usage with varying quantities of alcohol (consider withdrawal), metabolic abnormalities, organ dysfunctions (history of kidney injury, cirrhosis). Given no seizure-like activities or loss of consciousness, we plan to continue Levetiracetam 750mg twice daily with plans to obtain a trough level. Plan to complete Varenicline course and patient will touch base with Dr. Bean Isaac afterwards to discuss [employer] paperwork as patient would like to resume driving. His neurological exam is significant for abnormal L FNF, decreased sensation to fine touch distally in all 4 extremities, trace symmetric reflexes, and slightly wide based gait; otherwise, non-focal. Plan is below. We discussed the pathophysiology of epilepsy/seizure and seizure safety/precautions. We discussed factors that can lower seizure threshold and the side effects of antiepileptic medications. Patient Instructions   1. Continue Keppra 750mg twice daily. 2. Recheck Keppra (also known as Levetiracetam) level.  Try to either go to the lab first thing in the morning to check level and then take morning dosage afterwards, or take your morning dosage as usual and go to the lab to have your level checked later in the day, so we can have a trough level of your Keppra blood level. Based on the level, we may consider changing your dosage of Keppra back to 1000mg twice daily. 3. Complete Varenicline course. Varenicline does have a risk for seizures, especially using the first month of usage. 4. Recommend continue alcohol abstinence. 5. Discussed with patient no driving in the meantime. In 1 month after you have completed Varenicline, if you are still seizure-free with no loss of consciousness events, reach out to Dr. Rachael Alves to submit a form to [facility] as we understand you are interested in resuming driving. Diagnoses and all orders for this visit:    Seizure disorder [facility])  -     Ambulatory Referral to Neurology  -     Levetiracetam level; Future        Subjective    Jenni Begun is a 63YO R-handed M with history of seizure disorder, alcoholic cirrhosis, hyponatremia, SANDOVAL, depression, insomnia who presenting [facility] as a new patient for evaluation and management of seizures. Patient was last seen by a neurologist per chart review on 6/18/2019 with Danelle Jacobson [facility], Dr. Cole Tristan MD. Per chart review of that note, patient was admitted to the hospital in October 2017 for a generalized motor seizure with complicating factors at the time including alcohol usage, hyponatremia, and hypomagnesemia. During that admission, he had an EEG study performed which was normal. He also had an extended EEG video monitoring, which demonstrated no evident epileptogenic findings. He reportedly had several seizures earlier that year and was started on levetiracetam as a result. From November 2017 to June 18, 2019, patient reported no further seizure activity or loss of consciousness.  However, review of records showed he did have a hospital admission on 5/2/2018 with electrolyte imbalance and alcohol issues with reportedly multiple seizure-like episodes. At that point, it was reported he was drinking again for at least 6 months of average a six-pack of 12 oz beers daily. He has history of thrombocytopenia and hyponatremia (132). Thrombocytopenia has on occasion been reported with levetiracetam; however, also noting patient’s history of alcoholic cirrhosis. Plan was to consult further with his PCP and monitor blood work. Today patient reports he has been seizure-free for 2 years without any loss of consciousness during that time period. He reports he has had a total of 5-6 seizures over the last 10 years; no known triggers that he reports; however, per chart review, he has a chronic history of heavy alcohol usage, metabolic abnormalities, acute kidney injuries, and he admits there may have been times where he has forgotten to take Levetiracetam and that may have resulted in a breakthrough seizure. He reports Levetiracetam was decreased from 1000mg BID to 750mg BID two weeks ago by his PCP due to elevated Levetiracetam level on lab draw. He has been tolerating this change without issue and without seizure-like activity. Denies any known jerks; reports occasional RLE restlessness during sleep. He also quit drinking 16 months ago, previously he used to drink up to half gallon of vodka daily along with 30-bottles of 12oz beer daily. He is currently trying to quit smoking and is on day 2 of 37 days of Varenicline, currently taking 0.5mg daily with instructions to uptitrate to 1mg twice daily. His motivation to quit drinking and smoking is to be around with his children, as he had a scare last May while he was hospitalized with multiple organ failure with concerns for poor prognosis. He is interested in having his license back.     Current AEDs:  Levetiracetam 750mg twice daily - changed two weeks ago by PCP from 1000mg BID due to Levetiracetam level of 55.8 on recent lab draw  Medication side effects: None  Medication adherence: Yes    Event/Seizure semiology:  • Patient would stand up from the sofa, take 5 steps, would LOC, fall, stiffening, with generalized shaking, lasted around 5 minutes. No tongue laceration (no teeth). Amnesia of the event. No bladder/bowel incontinence. Would incur injury (lacerations, abrasions lynsey on the head from head strike) secondary to fall. Always occurred with standing up. Would re-occur a couple months later. Admits to drinking a little bit of water near and there. Denies history of jerks. • No aura. • Immediately upon cessation of shaking and then awakening, patient reports he is able to verbalize coherently and fluently right away. Recognizes familiar faces and oriented to place right away. However, he also doesn't remember ambulance ride after seizures. And also stated first memory is being in the hospital after seizures. Special Features  Status epilepticus: Seizures lasted about 5 minutes. No back-to-back seizures. Self Injury Seizures: yes - head strike, laceration, abrasion from fall  Precipitating Factors: none - even with quitting drinking; denied delirium tremens  Age/Date of seizure onset: About 2013  Longest seizure free interval: 2 years ago - last seizure in setting of kidney failure    Epilepsy Risk Factors:  Alcohol abuse - history of heavy alcohol usage, quit 16 months ago  History of benzodiazepine usage - last Xanax usage 1 month ago, discontinued by PCP and started on Doxepin instead for history of insomnia    Prior AEDs:  Levetiracetam 1000mg twice daily - helped stop the seizures    Prior Evaluation:  rEEG awake, drowsy, sleep, 6/27/2019: This is a normal routine EEG. If a seizure disorder is considered clinically a repeat tracing with sleep deprivation may be of additional diagnostic value. rEEG awake, 11/21/2017:  This Routine EEG recorded during wakefulness is normal. A normal EEG does not exclude the diagnosis of epilepsy/seizure. If clinically indicated, a repeat EEG allowing for sleep, possibly after sleep deprivation or with prolonged recording, may provide additional diagnostic information. vEEG 7/29/2017-7/30/2017: This prolonged, continuous video-EEG recording is mildly abnormal.    A background of low amplitude beta and alpha activities without clear posteriorly dominant rhythm during wakefulness suggests possible mild nonspecific diffuse cerebral dysfunction. Enhanced beta activities are a medication effect related to benzodiazepine administration. No electrographic seizures or interictal epileptiform discharges are seen. [address] wo contrast, 5/20/2022:   No acute intracranial abnormality. Microangiopathic changes. Moderate diffuse parenchymal volume loss. Pansinus mucosal thickening. MRI brain seizure wo contrast, 7/2/2019:  No acute intracranial abnormality identified. There is diffuse cerebral volume loss which is out of proportion for patient's age. No significant change. Levetiracetam level, 8/11/2013: 55.8 (after taking morning dosage)  CBCD, 7/5/2023: Unremarkable including Platelets 115  BMP, 5/91/7628: Significant for hyponatremia 127    History Reviewed: The following were reviewed and updated as appropriate: allergies, current medications, past family history, past medical history, past social history, past surgical history and problem list     Psychiatric History:  None    Social History:   Driving: No -  license suspended for medical reasons 10 years ago  Lives Alone: No - lives with brother  Occupation: does not work - on SSI in setting of disability due to neuropathy and arthritis    ROS:  Review of Systems   Constitutional: Negative. HENT: Negative. Eyes: Negative. Respiratory: Negative. Cardiovascular: Negative. Gastrointestinal: Negative. Endocrine: Negative. Genitourinary: Negative. Musculoskeletal: Negative. Skin: Negative. Allergic/Immunologic: Negative. Neurological: Positive for seizures, chronic distal neuropathy in all 4 extremities. Hematological: Negative. Psychiatric/Behavioral: Negative. All other systems reviewed and are negative other than what is reported in HPI. Objective    /78 (BP Location: Right arm, Patient Position: Sitting, Cuff Size: Adult)   Pulse 88   Temp 98.3 °F (36.8 °C) (Temporal)   Ht 5' 4" (1.626 m)   Wt 59 kg (130 lb)   SpO2 95%   BMI 22.31 kg/m²      General Exam  General: well developed, no acute distress. HEENT: mucous membranes moist, anicteric sclera. Neck: supple, good ROM. Heart: regular rate. Chest: no respiratory distress. Extremities: no clubbing, cyanosis or edema. Skin: no rash on visible skin. Neurological Exam  Mental Status: awake, alert, and fully oriented to person, place, time, and situation. Fund of knowledge is appropriate for age and education. There is no neglect. Language: fluency, naming, comprehension, and repetition normal.       Cranial Nerves: Pupils equal and reactive to light. Visual fields full to confrontation. Extraocular motions intact with full versions, normal pursuits and saccades. Facial strength full and symmetric. Facial sensation intact in V1-V3. Hearing intact to finger rub bilaterally. Tongue protrudes to midline. Palate elevates symmetrically. Speech clear without notable dysarthria. Shoulder shrug activation full and symmetric. Motor: Normal bulk and tone. No pronator drift. Strength is 5/5 proximally and distally in all 4 extremities. No involuntary movements. Sensory: Sensation decreased to light touch distally in all extremities, symmetric. Coordination: Abnormal R FNF (patient reports secondary to prior R shoulder surgery). Normal L FNF. Station and gait: Slightly wide-based gait with casual gait.     Reflexes: Reflexes trace throughout and symmetric.     Lorraine Burgos DO   [facility]  [address] [facility]

## 2023-08-28 PROCEDURE — 88360 TUMOR IMMUNOHISTOCHEM/MANUAL: CPT | Performed by: PATHOLOGY

## 2023-08-28 PROCEDURE — 88342 IMHCHEM/IMCYTCHM 1ST ANTB: CPT | Performed by: PATHOLOGY

## 2023-08-28 PROCEDURE — 88305 TISSUE EXAM BY PATHOLOGIST: CPT | Performed by: PATHOLOGY

## 2023-08-29 ENCOUNTER — ANESTHESIA (OUTPATIENT)
Dept: GASTROENTEROLOGY | Facility: HOSPITAL | Age: 58
End: 2023-08-29

## 2023-08-29 ENCOUNTER — ANESTHESIA EVENT (OUTPATIENT)
Dept: GASTROENTEROLOGY | Facility: HOSPITAL | Age: 58
End: 2023-08-29

## 2023-08-29 ENCOUNTER — HOSPITAL ENCOUNTER (OUTPATIENT)
Dept: GASTROENTEROLOGY | Facility: HOSPITAL | Age: 58
Setting detail: OUTPATIENT SURGERY
Discharge: HOME | End: 2023-08-29
Attending: INTERNAL MEDICINE
Payer: COMMERCIAL

## 2023-08-29 VITALS
OXYGEN SATURATION: 94 % | TEMPERATURE: 97.9 F | WEIGHT: 130.5 LBS | RESPIRATION RATE: 20 BRPM | DIASTOLIC BLOOD PRESSURE: 75 MMHG | HEIGHT: 64 IN | HEART RATE: 75 BPM | BODY MASS INDEX: 22.28 KG/M2 | SYSTOLIC BLOOD PRESSURE: 107 MMHG

## 2023-08-29 DIAGNOSIS — K86.2 PANCREATIC CYST: ICD-10-CM

## 2023-08-29 DIAGNOSIS — K21.00 GASTROESOPHAGEAL REFLUX DISEASE WITH ESOPHAGITIS WITHOUT HEMORRHAGE: Primary | ICD-10-CM

## 2023-08-29 PROCEDURE — 88305 TISSUE EXAM BY PATHOLOGIST: CPT | Performed by: STUDENT IN AN ORGANIZED HEALTH CARE EDUCATION/TRAINING PROGRAM

## 2023-08-29 RX ORDER — LIDOCAINE HYDROCHLORIDE 10 MG/ML
INJECTION, SOLUTION EPIDURAL; INFILTRATION; INTRACAUDAL; PERINEURAL AS NEEDED
Status: DISCONTINUED | OUTPATIENT
Start: 2023-08-29 | End: 2023-08-29

## 2023-08-29 RX ORDER — FENTANYL CITRATE 50 UG/ML
INJECTION, SOLUTION INTRAMUSCULAR; INTRAVENOUS AS NEEDED
Status: DISCONTINUED | OUTPATIENT
Start: 2023-08-29 | End: 2023-08-29

## 2023-08-29 RX ORDER — PROPOFOL 10 MG/ML
INJECTION, EMULSION INTRAVENOUS AS NEEDED
Status: DISCONTINUED | OUTPATIENT
Start: 2023-08-29 | End: 2023-08-29

## 2023-08-29 RX ORDER — SODIUM CHLORIDE, SODIUM LACTATE, POTASSIUM CHLORIDE, CALCIUM CHLORIDE 600; 310; 30; 20 MG/100ML; MG/100ML; MG/100ML; MG/100ML
INJECTION, SOLUTION INTRAVENOUS CONTINUOUS PRN
Status: DISCONTINUED | OUTPATIENT
Start: 2023-08-29 | End: 2023-08-29

## 2023-08-29 RX ORDER — PROPOFOL 10 MG/ML
INJECTION, EMULSION INTRAVENOUS CONTINUOUS PRN
Status: DISCONTINUED | OUTPATIENT
Start: 2023-08-29 | End: 2023-08-29

## 2023-08-29 RX ORDER — OMEPRAZOLE 40 MG/1
40 CAPSULE, DELAYED RELEASE ORAL 2 TIMES DAILY
Qty: 120 CAPSULE | Refills: 0 | Status: SHIPPED | OUTPATIENT
Start: 2023-08-29 | End: 2023-10-28

## 2023-08-29 RX ADMIN — FENTANYL CITRATE 25 MCG: 50 INJECTION INTRAMUSCULAR; INTRAVENOUS at 08:44

## 2023-08-29 RX ADMIN — PROPOFOL 100 MCG/KG/MIN: 10 INJECTION, EMULSION INTRAVENOUS at 08:45

## 2023-08-29 RX ADMIN — SODIUM CHLORIDE, SODIUM LACTATE, POTASSIUM CHLORIDE, AND CALCIUM CHLORIDE: .6; .31; .03; .02 INJECTION, SOLUTION INTRAVENOUS at 08:41

## 2023-08-29 RX ADMIN — FENTANYL CITRATE 25 MCG: 50 INJECTION INTRAMUSCULAR; INTRAVENOUS at 08:48

## 2023-08-29 RX ADMIN — PROPOFOL 30 MG: 10 INJECTION, EMULSION INTRAVENOUS at 08:58

## 2023-08-29 RX ADMIN — PROPOFOL 100 MG: 10 INJECTION, EMULSION INTRAVENOUS at 08:44

## 2023-08-29 RX ADMIN — LIDOCAINE HYDROCHLORIDE 80 MG: 10 INJECTION, SOLUTION EPIDURAL; INFILTRATION; INTRACAUDAL; PERINEURAL at 08:44

## 2023-08-29 RX ADMIN — PROPOFOL 30 MG: 10 INJECTION, EMULSION INTRAVENOUS at 09:10

## 2023-08-29 NOTE — H&P
History and Physical - [facility]  Saw Beckham 63 y.o. male MRN: [id number]    HPI: Saw Beckham is a 63y.o. year old male who presents with possible cirrhosis and h/o pancreatic cyst.       Review of Systems    Historical Information   Past Medical History:   Diagnosis Date   • Alcohol abuse    • Traore esophagus    • Bowel obstruction ([address])    • Bowel perforation ([address])    • Cardiac disease    • Continuous chronic alcoholism ([address]) 10/5/2017   • COPD (chronic obstructive pulmonary disease) ([address])    • History of shoulder surgery     Right shoulder   • History of transfusion    • Hx of cervical spine surgery    • Hypertension    • Incisional hernia 10/8/2017   • MI, old    • Mitral regurgitation    • Psychiatric disorder    • Seizures ([address])      Past Surgical History:   Procedure Laterality Date   • APPENDECTOMY     • BACK SURGERY     • CHOLECYSTECTOMY     • ESOPHAGOGASTRODUODENOSCOPY N/A 11/28/2016    Procedure: ESOPHAGOGASTRODUODENOSCOPY (EGD); Surgeon: Dragan Lane MD;  Location:  GI LAB;   Service:    • GALLBLADDER SURGERY     • LAPAROTOMY N/A 10/25/2016    Procedure: LAPAROTOMY EXPLORATORY;  Surgeon: Capri Mondragon MD;  Location: [facility];  Service:    • MOUTH SURGERY     • PERCUTANEOUS PINNING FEMORAL NECK FRACTURE     • SHOULDER SURGERY Right    • SHOULDER SURGERY     • SMALL INTESTINE SURGERY     • STOMACH SURGERY      bal surgery     Social History   Social History     Substance and Sexual Activity   Alcohol Use Not Currently   • Alcohol/week: 42.0 standard drinks of alcohol   • Types: 42 Cans of beer per week    Comment: no drinks for 16 months     Social History     Substance and Sexual Activity   Drug Use No     Social History     Tobacco Use   Smoking Status Every Day   • Packs/day: 1.00   • Years: 35.00   • Total pack years: 35.00   • Types: Cigarettes   Smokeless Tobacco Never     Family History   Problem Relation Age of Onset   • Breast cancer Mother    • Prostate cancer Father    • Skin cancer Brother        Meds/Allergies     (Not in a hospital admission)      No Known Allergies    Objective     /79   Pulse 73   Temp (!) 97.3 °F (36.3 °C) (Tympanic)   Resp 16   Ht 5' 4" (1.626 m)   Wt 59.2 kg (130 lb 8 oz)   SpO2 96%   BMI 22.40 kg/m²       PHYSICAL EXAM    Gen: NAD  CV: RRR  CHEST: Clear  ABD: soft, NT/ND  EXT: no edema  Neuro: AAO      ASSESSMENT/PLAN:  This is a 63y.o. year old male here for EUS For evlauation of pancreatic cyst and liver cirrhosis.      PLAN:   Procedure: EGD/ EUS

## 2023-08-29 NOTE — ANESTHESIA PREPROCEDURE EVALUATION
Procedure:  ENDOSCOPIC ULTRASOUND (UPPER)    Relevant Problems   CARDIO   (+) Aortic ectasia (HCC)   (+) Essential hypertension   (+) Left anterior fascicular block      GI/HEPATIC   (+) Abnormality of pancreatic duct   (+) Acute on chronic pancreatitis (HCC)   (+) Alcoholic cirrhosis of liver without ascites (HCC)   (+) Alcoholic hepatitis without ascites   (+) Duodenal ulcer   (+) Gastroesophageal reflux disease with esophagitis without hemorrhage   (+) Hepatomegaly   (+) Ileus (HCC)   (+) Pancreatic pseudocyst      HEMATOLOGY   (+) Chronic anemia   (+) Coagulopathy (HCC)   (+) Dietary folate deficiency anemia   (+) Iron deficiency anemia due to chronic blood loss   (+) Microcytic anemia   (+) Pancytopenia (HCC)   (+) Thrombocytopenia (HCC)      NEURO/PSYCH   (+) Cervical disc disorder with myelopathy   (+) Depression   (+) Seizure disorder (HCC)      PULMONARY   (+) COPD (chronic obstructive pulmonary disease) (HCC)      Other   (+) Lesion of spleen    Last seizure 2yrs ago, keppra previously lowered in dose, hx of fall with T6 fracture and intermittent bilateral arm and leg numbness/paresthesia with left foot drop. Denies symptoms this morning of paresthesias. Physical Exam    Airway    Mallampati score: II  TM Distance: >3 FB  Neck ROM: full     Dental       Cardiovascular  Cardiovascular exam normal    Pulmonary  Pulmonary exam normal     Other Findings        Anesthesia Plan  ASA Score- 3     Anesthesia Type- IV sedation with anesthesia with ASA Monitors. Additional Monitors:   Airway Plan:           Plan Factors-Exercise tolerance (METS): >4 METS. Chart reviewed. EKG reviewed. Imaging results reviewed. Existing labs reviewed. Patient summary reviewed. Patient is not a current smoker. Patient did not smoke on day of surgery. Obstructive sleep apnea risk education given perioperatively. Induction- intravenous. Postoperative Plan- Plan for postoperative opioid use.      Informed Consent- Anesthetic plan and risks discussed with patient. I personally reviewed this patient with the CRNA. Discussed and agreed on the Anesthesia Plan with the CRNA. Hilary Ramachandran

## 2023-08-29 NOTE — ANESTHESIA POSTPROCEDURE EVALUATION
Post-Op Assessment Note    CV Status:  Stable    Pain management: adequate     Mental Status:  Awake   Hydration Status:  Stable   PONV Controlled:  Controlled   Airway Patency:  Patent      Post Op Vitals Reviewed: Yes      Staff: Anesthesiologist, CRNA         No notable events documented.     BP      Temp     Pulse     Resp      SpO2

## 2023-08-31 DIAGNOSIS — F10.10 ALCOHOL ABUSE: ICD-10-CM

## 2023-09-01 ENCOUNTER — OFFICE VISIT (OUTPATIENT)
Dept: PAIN MEDICINE | Facility: CLINIC | Age: 58
End: 2023-09-01
Payer: COMMERCIAL

## 2023-09-01 ENCOUNTER — APPOINTMENT (OUTPATIENT)
Dept: LAB | Facility: MEDICAL CENTER | Age: 58
End: 2023-09-01
Payer: COMMERCIAL

## 2023-09-01 VITALS
RESPIRATION RATE: 16 BRPM | HEIGHT: 64 IN | HEART RATE: 79 BPM | SYSTOLIC BLOOD PRESSURE: 115 MMHG | DIASTOLIC BLOOD PRESSURE: 74 MMHG | WEIGHT: 130.2 LBS | BODY MASS INDEX: 22.23 KG/M2

## 2023-09-01 DIAGNOSIS — M54.12 RADICULOPATHY, CERVICAL REGION: ICD-10-CM

## 2023-09-01 DIAGNOSIS — M54.16 LUMBAR RADICULOPATHY: Primary | ICD-10-CM

## 2023-09-01 DIAGNOSIS — Z98.890 H/O CERVICAL SPINE SURGERY: ICD-10-CM

## 2023-09-01 DIAGNOSIS — S22.009D CLOSED FRACTURE OF MULTIPLE THORACIC VERTEBRAE WITH ROUTINE HEALING, SUBSEQUENT ENCOUNTER: ICD-10-CM

## 2023-09-01 DIAGNOSIS — M47.12 OTHER SPONDYLOSIS WITH MYELOPATHY, CERVICAL REGION: ICD-10-CM

## 2023-09-01 DIAGNOSIS — K70.30 ALCOHOLIC CIRRHOSIS OF LIVER WITHOUT ASCITES (HCC): ICD-10-CM

## 2023-09-01 DIAGNOSIS — M48.02 CERVICAL SPINAL STENOSIS: ICD-10-CM

## 2023-09-01 DIAGNOSIS — G40.909 SEIZURE DISORDER (HCC): ICD-10-CM

## 2023-09-01 DIAGNOSIS — M51.36 LUMBAR DEGENERATIVE DISC DISEASE: ICD-10-CM

## 2023-09-01 DIAGNOSIS — M80.00XS OSTEOPOROSIS WITH CURRENT PATHOLOGICAL FRACTURE, UNSPECIFIED OSTEOPOROSIS TYPE, SEQUELA: ICD-10-CM

## 2023-09-01 DIAGNOSIS — M54.12 CERVICAL RADICULOPATHY: ICD-10-CM

## 2023-09-01 DIAGNOSIS — M48.061 SPINAL STENOSIS OF LUMBAR REGION, UNSPECIFIED WHETHER NEUROGENIC CLAUDICATION PRESENT: ICD-10-CM

## 2023-09-01 DIAGNOSIS — M47.816 LUMBAR SPONDYLOSIS: ICD-10-CM

## 2023-09-01 LAB
ALBUMIN SERPL BCP-MCNC: 3.7 G/DL (ref 3.5–5)
ALP SERPL-CCNC: 154 U/L (ref 34–104)
ALT SERPL W P-5'-P-CCNC: 9 U/L (ref 7–52)
ANION GAP SERPL CALCULATED.3IONS-SCNC: 10 MMOL/L
AST SERPL W P-5'-P-CCNC: 27 U/L (ref 13–39)
BILIRUB SERPL-MCNC: 0.45 MG/DL (ref 0.2–1)
BUN SERPL-MCNC: 7 MG/DL (ref 5–25)
CALCIUM SERPL-MCNC: 9 MG/DL (ref 8.4–10.2)
CHLORIDE SERPL-SCNC: 90 MMOL/L (ref 96–108)
CO2 SERPL-SCNC: 25 MMOL/L (ref 21–32)
CREAT SERPL-MCNC: 0.81 MG/DL (ref 0.6–1.3)
GFR SERPL CREATININE-BSD FRML MDRD: 98 ML/MIN/1.73SQ M
GLUCOSE P FAST SERPL-MCNC: 69 MG/DL (ref 65–99)
POTASSIUM SERPL-SCNC: 4.8 MMOL/L (ref 3.5–5.3)
PROT SERPL-MCNC: 7.8 G/DL (ref 6.4–8.4)
SODIUM SERPL-SCNC: 125 MMOL/L (ref 135–147)

## 2023-09-01 PROCEDURE — 99214 OFFICE O/P EST MOD 30 MIN: CPT | Performed by: ANESTHESIOLOGY

## 2023-09-01 PROCEDURE — 88305 TISSUE EXAM BY PATHOLOGIST: CPT | Performed by: STUDENT IN AN ORGANIZED HEALTH CARE EDUCATION/TRAINING PROGRAM

## 2023-09-01 PROCEDURE — 80053 COMPREHEN METABOLIC PANEL: CPT

## 2023-09-01 PROCEDURE — 80177 DRUG SCRN QUAN LEVETIRACETAM: CPT

## 2023-09-01 PROCEDURE — 36415 COLL VENOUS BLD VENIPUNCTURE: CPT

## 2023-09-01 RX ORDER — UBIDECARENONE 75 MG
100 CAPSULE ORAL DAILY
Qty: 30 TABLET | Refills: 5 | Status: SHIPPED | OUTPATIENT
Start: 2023-09-01

## 2023-09-01 RX ORDER — OXYCODONE HYDROCHLORIDE 5 MG/1
5 TABLET ORAL EVERY 4 HOURS PRN
Qty: 120 TABLET | Refills: 0 | Status: SHIPPED | OUTPATIENT
Start: 2023-09-01 | End: 2023-10-01

## 2023-09-01 RX ORDER — PREGABALIN 75 MG/1
75 CAPSULE ORAL 3 TIMES DAILY
Qty: 90 CAPSULE | Refills: 1 | Status: SHIPPED | OUTPATIENT
Start: 2023-09-01 | End: 2023-10-01

## 2023-09-01 RX ORDER — OXYCODONE HYDROCHLORIDE 5 MG/1
5 TABLET ORAL EVERY 4 HOURS PRN
Qty: 120 TABLET | Refills: 0 | Status: SHIPPED | OUTPATIENT
Start: 2023-10-01 | End: 2023-10-31

## 2023-09-01 NOTE — PATIENT INSTRUCTIONS
Opioid Safety   WHAT YOU NEED TO KNOW:   An opioid medicine is used to treat pain. Examples are oxycodone, morphine, fentanyl, or codeine. Pain control and management may help you rest, heal, and return to your daily activities. You and your family will receive information about how to manage your pain at home. The instructions will include what to do if you have side effects as your pain is managed. You will get information on how to handle opioid medicine safely. You will also get suggestions on how to control pain without opioids. It is important to follow all instructions so your pain is managed effectively. DISCHARGE INSTRUCTIONS:   Call your local emergency number (911 in the ), or have someone else call if:   You have a seizure. You cannot be woken. You have trouble staying awake and your breathing is slow or shallow. Your speech is slurred, or you are confused. You are dizzy or stumble when you walk. Call your doctor, or have someone close to you call if:   You are extremely drowsy, or you have trouble staying awake or speaking. You have pale or clammy skin. You have blue fingernails or lips. Your heartbeat is slower than normal.    You cannot stop vomiting. You have questions or concerns about your condition or care. Use opioids safely:   Take prescribed opioids exactly as directed. Opioids come with directions based on the kind and how it is given. Talk to your healthcare provider or a pharmacist if you have any questions. Do not take more than the recommended amount. Too much can cause a life-threatening overdose. Do not continue to take it after your pain stops. You may develop tolerance. This means you keep needing higher doses to get the same effect. You may also develop opioid use disorder. This means you are not able to control your opioid use. Do not give opioids to others or take opioids that belong to someone else.   The kind or amount one person takes may not be right for another. The person you share them with may also be taking medicines that do not mix with opioids. He or she may drink alcohol or use other drugs that can cause life-threatening problems when mixed with opioids. Do not mix opioids with other medicines or alcohol. The combination can cause an overdose, or cause you to stop breathing. Alcohol, sleeping pills, and medicines such as antihistamines can make you sleepy. A combination with opioids can lead to a coma. Do not drive or operate heavy machinery after you use an opioid. You may feel drowsy or have trouble concentrating. You can injure yourself or others if you drive or use heavy machinery when you are not alert. Your provider or pharmacist can tell you how long to wait after a dose before you do these activities. Talk to your healthcare provider if you have any side effects. Side effects include nausea, sleepiness, itching, and trouble thinking clearly. Your provider may need to make changes to the kind or amount of opioid you are taking. He or she can also help you find ways to prevent or relieve side effects. Manage constipation:  Constipation is the most common side effect of opioid medicine. Constipation is when you have hard, dry bowel movements, or you go longer than usual between bowel movements. Tell your healthcare provider about all changes in your bowel movements while you are taking opioids. He or she may recommend laxative medicine to help you have a bowel movement. He or she may also change the kind of opioid you are taking, or change when you take it. The following are more ways you can prevent or relieve constipation:  Drink liquids as directed. You may need to drink extra liquids to help soften and move your bowels. Ask how much liquid to drink each day and which liquids are best for you. Eat high-fiber foods. This may help decrease constipation by adding bulk to your bowel movements.  High-fiber foods include fruits, vegetables, whole-grain breads and cereals, and beans. Your healthcare provider or dietitian can help you create a high-fiber meal plan. Your provider may also recommend a fiber supplement if you cannot get enough fiber from food. Exercise regularly. Regular physical activity can help stimulate your intestines. Walking is a good exercise to prevent or relieve constipation. Ask which exercises are best for you. Schedule a time each day to have a bowel movement. This may help train your body to have regular bowel movements. Bend forward while you are on the toilet to help move the bowel movement out. Sit on the toilet for at least 10 minutes, even if you do not have a bowel movement. Store opioids safely:   Store opioids where others cannot easily get them. Keep them in a locked cabinet or secure area. Do not  keep them in a purse or other bag you carry with you. A person may be looking for something else and find the opioids. Make sure opioids are stored out of the reach of children. A child can easily overdose on opioids. Opioids may look like candy to a small child. The best way to dispose of opioids: The laws vary by country and area. In the [facility], the best way is to return the opioids through a take-back program. This program is offered by the [employer] ([employer]). The following are options for using the program:  Take the opioids to a [employer] collection site. The site is often a law enforcement center. Call your local law enforcement center for scheduled take-back days in your area. You will be given information on where to go if the collection site is in a different location. Take the opioids to an approved pharmacy or hospital.  A pharmacy or hospital may be set up as a collection site. You will need to ask if it is a [employer] collection site if you were not directed there.  A pharmacy or doctor's office may not be able to take back opioids unless it is a [employer] site.    Use a mail-back system. This means you are given containers to put the opioids into. You will then mail them in the containers. Use a take-back drop box. This is a place to leave the opioids at any time. People and animals will not be able to get into the box. Your local law enforcement agency can tell you where to find a drop box in your area. Other safe ways to dispose of opioids: The medicine may come with disposal instructions. The instructions may vary depending on the brand of medicine you are using. Instructions may come in a Medication Guide, but not every medicine has one. You may instead get instructions from your pharmacy or doctor. Follow instructions carefully. The following are general guidelines to follow:  Find out if you can flush the opioid. Some opioids can be flushed down the toilet or poured into the sink. You will need to contact authorities in your area to see if this is an option for you. The FDA also offers a list of medicines that are safe to flush down the toilet. You can check the list if you cannot get the information for your local area. Ask your waste management company about rules for putting opioids in the trash. The company will be able to give you specific directions. Scratch out personal information on the original medicine label so it cannot be read. Then put it in the trash. Do not label the trash or put any information on it about the opioids. It should look like regular household trash so no one is tempted to look for the opioids. Keep the trash out of the reach of children and animals. Always make sure trash is secure. Talk to officials if you live in a facility. If you live in a nursing home or assisted living center, talk to an official. The person will know the rules for your area. Other ways to manage pain:   Ask your healthcare provider about non-opioid medicines to control pain.   Some medicines may even work better than opioids, depending on the cause of your pain. Nonprescription medicines include NSAIDs (such as ibuprofen) and acetaminophen. Prescription medicines include muscle relaxers, antidepressants, and steroids. Pain may be managed without any medicines. Some ways to relieve pain include massage, aromatherapy, or meditation. Physical or occupational therapy may also help. For more information:   Drug Enforcement Administration  [address] , [address]  Phone: 7- 619 - [phone]  Web Address: [employer].. [employer].PrintFu/drug_disposal/    [address] and Drug Administration  [address] , [address]  Phone: 4- 077 - [phone]  Web Address: http://KDW/  Follow up with your doctor or pain specialist as directed: You may need to have your dose adjusted. Your doctor or pain specialist can also help you find ways to manage pain without opioids. Write down your questions so you remember to ask them during your visits. © Copyright [facility] 2022 Information is for End User's use only and may not be sold, redistributed or otherwise used for commercial purposes. The above information is an  only. It is not intended as medical advice for individual conditions or treatments. Talk to your doctor, nurse or pharmacist before following any medical regimen to see if it is safe and effective for you.

## 2023-09-01 NOTE — PROGRESS NOTES
Assessment:  1. Lumbar radiculopathy    2. Closed fracture of multiple thoracic vertebrae with routine healing, subsequent encounter    3. Other spondylosis with myelopathy, cervical region    4. H/O cervical spine surgery    5. Alcoholic cirrhosis of liver without ascites (HCC)    6. Cervical spinal stenosis    7. Radiculopathy, cervical region    8. Lumbar degenerative disc disease    9. Spinal stenosis of lumbar region, unspecified whether neurogenic claudication present    10. Lumbar spondylosis    11. Cervical radiculopathy        Plan:  Patient is a 71-year-old male complains of arm pain, low back pain, leg pain with chronic pain syndrome secondary to cervical radiculopathy status post ACDF, lumbar radiculopathy presents to office for follow-up visit. UDS was reviewed and showed evidence of alcohol consumption. Patient was educated on the combination of opioids. Patient was instructed that if alcohol does show up on his next urine drug screen that he is at risk of termination of opioid regimen. Patient denied water therapy secondary to history of seizures. He was informed that he will have to be seizure-free for 5 years in order for him to be a candidate for aqautherapy. 1. We will titrate Lyrica up to 75 mg p.o. 3 times daily  2. We will continue diclofenac 50 mg p.o. twice daily  3. We will start oxycodone 5 times 4 times daily      There are risks associated with opioid medications, including dependence, addiction and tolerance. The patient understands and agrees to use these medications only as prescribed. Potential side effects of the medications include, but are not limited to, constipation, drowsiness, addiction, impaired judgment and risk of fatal overdose if not taken as prescribed. The patient was warned against driving while taking sedation medications. Sharing medications is a felony.  At this point in time, the patient is showing no signs of addiction, abuse, diversion or suicidal ideation. [address] Prescription Drug Monitoring Program report was reviewed and was appropriate       History of Present Illness: The patient is a 63 y.o. male who presents for a follow up office visit in regards to Neck Pain, Back Pain, and Arm Pain (Bilateral). The patient’s current symptoms include 10 constant sharp cramping, pressure-like, shooting pain without a particular time pattern. Current pain medications includes: Lyrica 50 mg p.o. 3 times daily,. The patient reports that this regimen is providing 20% pain relief. The patient is reporting no side effects from this pain medication regimen. I have personally reviewed and/or updated the patient's past medical history, past surgical history, family history, social history, current medications, allergies, and vital signs today. Review of Systems  Review of Systems   Musculoskeletal: Positive for back pain, gait problem, myalgias and neck pain. Decreased ROM  Joint stiffness  Pain - neuropathy   Neurological: Positive for seizures. All other systems reviewed and are negative. Past Medical History:   Diagnosis Date   • Alcohol abuse    • Traore esophagus    • Bowel obstruction (HCC)    • Bowel perforation (HCC)    • Cardiac disease    • Continuous chronic alcoholism ([address]) 10/5/2017   • COPD (chronic obstructive pulmonary disease) (HCC)    • History of shoulder surgery     Right shoulder   • History of transfusion    • Hx of cervical spine surgery    • Hypertension    • Incisional hernia 10/8/2017   • MI, old    • Mitral regurgitation    • Psychiatric disorder    • Seizures ([address])        Past Surgical History:   Procedure Laterality Date   • APPENDECTOMY     • BACK SURGERY     • CHOLECYSTECTOMY     • ESOPHAGOGASTRODUODENOSCOPY N/A 11/28/2016    Procedure: ESOPHAGOGASTRODUODENOSCOPY (EGD); Surgeon: Westley Guzman MD;  Location: [facility];   Service:    • GALLBLADDER SURGERY     • LAPAROTOMY N/A 10/25/2016 Procedure: LAPAROTOMY EXPLORATORY;  Surgeon: Neelam Camacho MD;  Location: [facility];  Service:    • MOUTH SURGERY     • PERCUTANEOUS PINNING FEMORAL NECK FRACTURE     • SHOULDER SURGERY Right    • SHOULDER SURGERY     • SMALL INTESTINE SURGERY     • STOMACH SURGERY      bal surgery       Family History   Problem Relation Age of Onset   • Breast cancer Mother    • Prostate cancer Father    • Skin cancer Brother        Social History     Occupational History   • Not on file   Tobacco Use   • Smoking status: Every Day     Packs/day: 1.00     Years: 35.00     Total pack years: 35.00     Types: Cigarettes   • Smokeless tobacco: Never   Vaping Use   • Vaping Use: Former   Substance and Sexual Activity   • Alcohol use: Not Currently     Alcohol/week: 42.0 standard drinks of alcohol     Types: 42 Cans of beer per week     Comment: no drinks for 16 months   • Drug use: No   • Sexual activity: Not Currently     Partners: Female         Current Outpatient Medications:   •  albuterol (PROVENTIL HFA,VENTOLIN HFA) 90 mcg/act inhaler, INHALE 2 PUFFS BY MOUTH EVERY 6 HOURS AS NEEDED FOR WHEEZING, Disp: 9 g, Rfl: 0  •  calcium carbonate (OS-KORI) 600 MG tablet, Take 1 tablet (600 mg total) by mouth daily, Disp: 90 tablet, Rfl: 3  •  Cholecalciferol 25 MCG (1000 UT) tablet, Take 1 tablet (1,000 Units total) by mouth daily, Disp: 30 tablet, Rfl: 2  •  cyanocobalamin (VITAMIN B-12) 100 mcg tablet, Take 1 tablet (100 mcg total) by mouth daily, Disp: 30 tablet, Rfl: 5  •  Diclofenac Sodium (VOLTAREN) 1 %, Apply 2 g topically 4 (four) times a day, Disp: 100 g, Rfl: 1  •  docusate sodium (COLACE) 100 mg capsule, Take 1 capsule (100 mg total) by mouth 2 (two) times a day as needed for constipation, Disp: 30 capsule, Rfl: 0  •  doxepin (SINEquan) 50 mg capsule, Take 1 capsule (50 mg total) by mouth daily at bedtime, Disp: 30 capsule, Rfl: 0  •  ergocalciferol (VITAMIN D2) 50,000 units, Take 1 capsule (50,000 Units total) by mouth once a week, Disp: 13 capsule, Rfl: 1  •  folic acid (FOLVITE) 1 mg tablet, Take 1 tablet (1 mg total) by mouth daily, Disp: 30 tablet, Rfl: 2  •  levETIRAcetam (KEPPRA) 750 mg tablet, Take 1 tablet (750 mg total) by mouth every 12 (twelve) hours, Disp: 60 tablet, Rfl: 1  •  lisinopril (ZESTRIL) 2.5 mg tablet, Take 1 tablet (2.5 mg total) by mouth daily, Disp: 90 tablet, Rfl: 2  •  Multiple Vitamin (TAB-A-BONI PO), Take 1 tablet by mouth daily, Disp: , Rfl:   •  omeprazole (PriLOSEC) 40 MG capsule, Take 1 capsule (40 mg total) by mouth 2 (two) times a day, Disp: 120 capsule, Rfl: 0  •  pregabalin (LYRICA) 50 mg capsule, Take 1 capsule (50 mg total) by mouth 3 (three) times a day, Disp: 90 capsule, Rfl: 1  •  sodium chloride 1 g tablet, Take 2 tablets (2 g total) by mouth 3 (three) times a day with meals, Disp: 240 tablet, Rfl: 3  •  thiamine (VITAMIN B1) 100 mg tablet, Take 1 tablet (100 mg total) by mouth daily, Disp: 90 tablet, Rfl: 1  •  varenicline 0.5 MG X 11 & 1 MG X 42 tablet therapy pack, Take 0.5 mg by mouth daily for 3 days, THEN 0.5 mg 2 (two) times a day for 4 days, THEN 1 mg 2 (two) times a day., Disp: 53 each, Rfl: 0  •  diclofenac sodium (VOLTAREN) 50 mg EC tablet, Take 1 tablet (50 mg total) by mouth 2 (two) times a day (Patient not taking: Reported on 8/18/2023), Disp: 60 tablet, Rfl: 1  •  hydrOXYzine HCL (ATARAX) 50 mg tablet, , Disp: , Rfl:     No Known Allergies    Physical Exam:    There were no vitals taken for this visit. Constitutional:normal, well developed, well nourished, alert, in no distress and non-toxic and no overt pain behavior.   Eyes:anicteric  HEENT:grossly intact  Neck:supple, symmetric, trachea midline and no masses   Pulmonary:even and unlabored  Cardiovascular:No edema or pitting edema present  Skin:Normal without rashes or lesions and well hydrated  Psychiatric:Mood and affect appropriate  Neurologic:Cranial Nerves II-XII grossly intact  Musculoskeletal:antalgic    Imaging    Study Result    Narrative & Impression   MRI CERVICAL SPINE WITHOUT CONTRAST     INDICATION: M54.12: Radiculopathy, cervical region  Z98.1: Arthrodesis status.     COMPARISON: 8/12/2014 and CT from 5/20/2022     TECHNIQUE:  Multiplanar, multisequence imaging of the cervical spine was performed. .        IMAGE QUALITY:  Diagnostic     FINDINGS:     ALIGNMENT: The patient is status post anterior cervical discectomy and spinal fusion from C5-C7. Patient is also status post posterior spinal fusion from C4-T1. There is trace anterolisthesis at C7-T1.     MARROW SIGNAL:  Normal marrow signal is identified within the visualized bony structures. No discrete marrow lesion.     CERVICAL AND VISUALIZED THORACIC CORD:  Normal signal within the visualized cord.     PREVERTEBRAL AND PARASPINAL SOFT TISSUES:  Normal.     VISUALIZED POSTERIOR FOSSA:  The visualized posterior fossa demonstrates no abnormal signal.     CERVICAL DISC SPACES:     C2-C3: There is a disc osteophyte complex with uncovertebral spurring. There is hypertrophy of the posterior arch. There is moderate canal stenosis with contact of the cord. There is mild to moderate foraminal narrowing bilaterally.     C3-C4: There is a disc osteophyte complex with uncovertebral spurring. There is hypertrophy of the posterior arch. There is mild to moderate canal stenosis. There is mild to moderate foraminal narrowing.     C4-C5: Canal is decompressed by laminectomy. No significant foraminal narrowing.     C5-C6: Canal is decompressed by laminectomy. No significant foraminal narrowing.     C6-C7: Canal is decompressed by laminectomy. No significant foraminal narrowing.     C7-T1: No significant canal stenosis or foraminal narrowing.  Canal is decompressed by laminectomy.     UPPER THORACIC DISC SPACES:  Normal.     OTHER FINDINGS:  None.     IMPRESSION:     Postoperative changes and multilevel cervical spondylosis, as described above.     Interval development of cervical spondylosis at C2-C3 and C3-C4 since MRI from 8/12/2014 as described above. No intrinsic cord signal abnormality seen.        Workstation performed: [id number]              No orders to display       No orders of the defined types were placed in this encounter.

## 2023-09-04 LAB — LEVETIRACETAM SERPL-MCNC: 41.3 UG/ML (ref 10–40)

## 2023-09-05 ENCOUNTER — TELEPHONE (OUTPATIENT)
Age: 58
End: 2023-09-05

## 2023-09-05 DIAGNOSIS — E87.1 HYPONATREMIA: ICD-10-CM

## 2023-09-05 RX ORDER — SODIUM CHLORIDE 1 G/1
2 TABLET ORAL
Qty: 240 TABLET | Refills: 3 | Status: SHIPPED | OUTPATIENT
Start: 2023-09-05

## 2023-09-05 NOTE — TELEPHONE ENCOUNTER
PA REQUEST FOR OXY 5 MG HAS BEEN SUBMITTED TO PLAN FOR COVERAGE DETERMINATION    WILL AWAIT RESPONSE    [facility] [address] [id number]

## 2023-09-05 NOTE — TELEPHONE ENCOUNTER
I spoke to pharmacy. POD -Pt needs prior auth for Oxy    Dr Leggett Mins.  Pharmacist wanted you to be aware pt is on Xanax and needs to be sure you are okay with pt taking opioid as well

## 2023-09-05 NOTE — TELEPHONE ENCOUNTER
Caller: patient    Doctor: GAL    Reason for call: calling for oxycodone, pharmacy not able to fill w/o a diagnosis  [address]    Call back#:

## 2023-09-05 NOTE — TELEPHONE ENCOUNTER
Mary Kay White: Georgiana Pittman    Doctor: Oleksandr Arnold     Reason for call: How long does the pt need to take the Oxy script?     Call back#: [phone]

## 2023-09-06 NOTE — TELEPHONE ENCOUNTER
S/W April from [facility] INS. Advised April of Marty recommendations. April verbalized understanding. Per April: Medication authorization was approved for x1 month only (September's script) & a new  Dill will need to be submitted on or before next month (10/5/23). Per April: September's approved medication authorization # [id number].

## 2023-09-06 NOTE — TELEPHONE ENCOUNTER
Per note from [facility] has been approved for 1 month. Will need new auth for next script.  Approval in Media

## 2023-09-18 DIAGNOSIS — E53.8 FOLIC ACID DEFICIENCY: ICD-10-CM

## 2023-09-18 RX ORDER — FOLIC ACID 1 MG/1
1000 TABLET ORAL DAILY
Qty: 30 TABLET | Refills: 0 | Status: SHIPPED | OUTPATIENT
Start: 2023-09-18

## 2023-09-19 ENCOUNTER — TELEPHONE (OUTPATIENT)
Age: 58
End: 2023-09-19

## 2023-09-19 DIAGNOSIS — Z98.890 H/O CERVICAL SPINE SURGERY: ICD-10-CM

## 2023-09-19 DIAGNOSIS — M48.02 CERVICAL SPINAL STENOSIS: Primary | ICD-10-CM

## 2023-09-19 DIAGNOSIS — M54.2 NECK PAIN: ICD-10-CM

## 2023-09-19 NOTE — TELEPHONE ENCOUNTER
Pt is currently seeing Physical therapy for back but wanted to know if the Dr can give him a referral for PT for his neck aswell.       Please advise      Pt can be reached at [phone]

## 2023-09-20 NOTE — TELEPHONE ENCOUNTER
Caller: Lisa Barger PT    Doctor: Ramses Ortiz     Reason for call: Pt called back with a fax number for the physical therapy     Fax # [phone] attn : Alvin Ellis    Call back#: [phone]

## 2023-09-25 DIAGNOSIS — K70.10 ALCOHOLIC HEPATITIS WITHOUT ASCITES: ICD-10-CM

## 2023-09-25 DIAGNOSIS — F51.04 PSYCHOPHYSIOLOGICAL INSOMNIA: ICD-10-CM

## 2023-09-25 RX ORDER — THIAMINE MONONITRATE (VIT B1) 100 MG
100 TABLET ORAL DAILY
Qty: 90 TABLET | Refills: 1 | Status: SHIPPED | OUTPATIENT
Start: 2023-09-25

## 2023-09-25 RX ORDER — DOXEPIN HYDROCHLORIDE 50 MG/1
50 CAPSULE ORAL
Qty: 30 CAPSULE | Refills: 0 | Status: SHIPPED | OUTPATIENT
Start: 2023-09-25

## 2023-09-25 NOTE — TELEPHONE ENCOUNTER
Yes, Hi. My name is Kingsley Uribe. Date of birth is [birth year]. I need 2 refills. One for my Doxepin Doxepin and one for my vitamin B1. You didn't call them into the [facility] in [place]. Thank you. Have a good day.

## 2023-10-02 ENCOUNTER — TELEPHONE (OUTPATIENT)
Dept: NEUROLOGY | Facility: CLINIC | Age: 58
End: 2023-10-02

## 2023-10-02 NOTE — TELEPHONE ENCOUNTER
Patient called he did not have a seizure after taking Chantix. He took it for 30 days. 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 . Patient would like if we get the forms for him and send it to [facility]. Please call patient when this is completed so he can call [employer] to get his license back.

## 2023-10-03 DIAGNOSIS — E87.1 HYPONATREMIA: ICD-10-CM

## 2023-10-04 RX ORDER — SODIUM CHLORIDE 1 G/1
2 TABLET ORAL
Qty: 240 TABLET | Refills: 3 | Status: SHIPPED | OUTPATIENT
Start: 2023-10-04

## 2023-10-05 ENCOUNTER — TELEPHONE (OUTPATIENT)
Dept: NEUROLOGY | Facility: CLINIC | Age: 58
End: 2023-10-05

## 2023-10-05 NOTE — TELEPHONE ENCOUNTER
Spoke with pt in regard to [employer] paperwork assured him it has been signed and fax over to [employer] and that if he needs anything to give the office a call.

## 2023-10-06 DIAGNOSIS — G40.909 SEIZURE DISORDER (HCC): ICD-10-CM

## 2023-10-06 RX ORDER — LEVETIRACETAM 750 MG/1
750 TABLET ORAL EVERY 12 HOURS
Qty: 60 TABLET | Refills: 1 | Status: SHIPPED | OUTPATIENT
Start: 2023-10-06

## 2023-10-10 RX ORDER — LANOLIN ALCOHOL/MO/W.PET/CERES
100 CREAM (GRAM) TOPICAL DAILY
COMMUNITY
Start: 2023-09-26

## 2023-10-11 ENCOUNTER — OFFICE VISIT (OUTPATIENT)
Dept: PAIN MEDICINE | Facility: CLINIC | Age: 58
End: 2023-10-11
Payer: COMMERCIAL

## 2023-10-11 ENCOUNTER — APPOINTMENT (OUTPATIENT)
Dept: RADIOLOGY | Facility: MEDICAL CENTER | Age: 58
End: 2023-10-11
Payer: COMMERCIAL

## 2023-10-11 VITALS
BODY MASS INDEX: 22.53 KG/M2 | DIASTOLIC BLOOD PRESSURE: 87 MMHG | WEIGHT: 132 LBS | HEART RATE: 66 BPM | SYSTOLIC BLOOD PRESSURE: 147 MMHG | RESPIRATION RATE: 16 BRPM | HEIGHT: 64 IN

## 2023-10-11 DIAGNOSIS — S22.43XA CLOSED FRACTURE OF MULTIPLE RIBS OF BOTH SIDES, INITIAL ENCOUNTER: Primary | ICD-10-CM

## 2023-10-11 DIAGNOSIS — Z98.890 H/O CERVICAL SPINE SURGERY: ICD-10-CM

## 2023-10-11 DIAGNOSIS — M54.12 RADICULOPATHY, CERVICAL REGION: ICD-10-CM

## 2023-10-11 DIAGNOSIS — M48.02 CERVICAL SPINAL STENOSIS: ICD-10-CM

## 2023-10-11 DIAGNOSIS — M54.12 CERVICAL RADICULOPATHY: ICD-10-CM

## 2023-10-11 DIAGNOSIS — S22.43XA CLOSED FRACTURE OF MULTIPLE RIBS OF BOTH SIDES, INITIAL ENCOUNTER: ICD-10-CM

## 2023-10-11 DIAGNOSIS — F11.20 UNCOMPLICATED OPIOID DEPENDENCE (HCC): ICD-10-CM

## 2023-10-11 DIAGNOSIS — M47.12 OTHER SPONDYLOSIS WITH MYELOPATHY, CERVICAL REGION: ICD-10-CM

## 2023-10-11 DIAGNOSIS — G89.4 CHRONIC PAIN SYNDROME: ICD-10-CM

## 2023-10-11 DIAGNOSIS — Z79.891 LONG-TERM CURRENT USE OF OPIATE ANALGESIC: ICD-10-CM

## 2023-10-11 DIAGNOSIS — M54.16 LUMBAR RADICULOPATHY: ICD-10-CM

## 2023-10-11 DIAGNOSIS — M51.36 LUMBAR DEGENERATIVE DISC DISEASE: ICD-10-CM

## 2023-10-11 DIAGNOSIS — M48.061 SPINAL STENOSIS OF LUMBAR REGION, UNSPECIFIED WHETHER NEUROGENIC CLAUDICATION PRESENT: ICD-10-CM

## 2023-10-11 DIAGNOSIS — M47.816 LUMBAR SPONDYLOSIS: ICD-10-CM

## 2023-10-11 DIAGNOSIS — S22.009D CLOSED FRACTURE OF MULTIPLE THORACIC VERTEBRAE WITH ROUTINE HEALING, SUBSEQUENT ENCOUNTER: ICD-10-CM

## 2023-10-11 PROCEDURE — 99214 OFFICE O/P EST MOD 30 MIN: CPT | Performed by: ANESTHESIOLOGY

## 2023-10-11 PROCEDURE — 71111 X-RAY EXAM RIBS/CHEST4/> VWS: CPT

## 2023-10-11 RX ORDER — PREGABALIN 100 MG/1
100 CAPSULE ORAL 3 TIMES DAILY
Qty: 90 CAPSULE | Refills: 0 | Status: SHIPPED | OUTPATIENT
Start: 2023-10-11 | End: 2023-11-10

## 2023-10-11 RX ORDER — OXYCODONE HYDROCHLORIDE 5 MG/1
5 TABLET ORAL EVERY 6 HOURS PRN
Qty: 120 TABLET | Refills: 0 | Status: SHIPPED | OUTPATIENT
Start: 2023-11-10 | End: 2023-12-10

## 2023-10-11 RX ORDER — OXYCODONE HYDROCHLORIDE 5 MG/1
5 TABLET ORAL EVERY 4 HOURS PRN
Qty: 120 TABLET | Refills: 0 | Status: SHIPPED | OUTPATIENT
Start: 2023-10-11 | End: 2023-11-10

## 2023-10-11 NOTE — PROGRESS NOTES
Assessment:  1. Lumbar spondylosis    2. Cervical radiculopathy    3. Radiculopathy, cervical region    4. Lumbar degenerative disc disease    5. Spinal stenosis of lumbar region, unspecified whether neurogenic claudication present        Plan:  Patient is a 42-year-old male complains of arm pain, low back pain, leg pain with chronic pain syndrome secondary to cervical radiculopathy status post ACDF, lumbar radiculopathy presents to office for follow-up visit. Patient reports stable on current pain regimen. We did discuss patient's alcohol use and patient stated that he is no longer drinking alcohol. If UDS does come back positive with alcohol we will have to hold any further opioid prescriptions. 1.  We will refill oxycodone 5 mg p.o. 4 times daily  2. We will follow-up 2 months. We will review the urine he has from his office visits      There are risks associated with opioid medications, including dependence, addiction and tolerance. The patient understands and agrees to use these medications only as prescribed. Potential side effects of the medications include, but are not limited to, constipation, drowsiness, addiction, impaired judgment and risk of fatal overdose if not taken as prescribed. The patient was warned against driving while taking sedation medications. Sharing medications is a felony. At this point in time, the patient is showing no signs of addiction, abuse, diversion or suicidal ideation. A urine drug screen was collected at today's office visit as part of our medication management protocol. The point of care testing results were appropriate for what was being prescribed. The specimen will be sent for confirmatory testing. The drug screen is medically necessary because the patient is either dependent on opioid medication or is being considered for opioid medication therapy and the results could impact ongoing or future treatment.  The drug screen is to evaluate for the presences or absence of prescribed, non-prescribed, and/or illicit drugs/substances. [place] Prescription Drug Monitoring Program report was reviewed and was appropriate       History of Present Illness: The patient is a 63 y.o. male who presents for a follow up office visit in regards to Neck Pain and Back Pain. The patient’s current symptoms include 9/10 constant sharp, shooting which is worse at night time. Current pain medications includes:  Oxy IR 5 mg. The patient reports that this regimen is providing 1% pain relief. The patient is reporting no side effects from this pain medication regimen. I have personally reviewed and/or updated the patient's past medical history, past surgical history, family history, social history, current medications, allergies, and vital signs today. Review of Systems  Review of Systems   Musculoskeletal:  Positive for back pain, gait problem and neck pain. Decreased ROM  Joint stiffness   All other systems reviewed and are negative. Past Medical History:   Diagnosis Date    Alcohol abuse     Traore esophagus     Bowel obstruction (HCC)     Bowel perforation (HCC)     Cardiac disease     Continuous chronic alcoholism ([address]) 10/5/2017    COPD (chronic obstructive pulmonary disease) (HCC)     History of shoulder surgery     Right shoulder    History of transfusion     Hx of cervical spine surgery     Hypertension     Incisional hernia 10/8/2017    [facility], old     Mitral regurgitation     Psychiatric disorder     Seizures ([address])        Past Surgical History:   Procedure Laterality Date    APPENDECTOMY      BACK SURGERY      CHOLECYSTECTOMY      ESOPHAGOGASTRODUODENOSCOPY N/A 11/28/2016    Procedure: ESOPHAGOGASTRODUODENOSCOPY (EGD); Surgeon: Nishi Charles MD;  Location: [facility];   Service:     GALLBLADDER SURGERY      LAPAROTOMY N/A 10/25/2016    Procedure: LAPAROTOMY EXPLORATORY;  Surgeon: Bianca Viera MD;  Location: [facility];  Service:     MOUTH SURGERY      PERCUTANEOUS PINNING FEMORAL NECK FRACTURE      SHOULDER SURGERY Right     SHOULDER SURGERY      SMALL INTESTINE SURGERY      STOMACH SURGERY      bal surgery       Family History   Problem Relation Age of Onset    Breast cancer Mother     Prostate cancer Father     Skin cancer Brother        Social History     Occupational History    Not on file   Tobacco Use    Smoking status: Every Day     Packs/day: 1.00     Years: 35.00     Total pack years: 35.00     Types: Cigarettes    Smokeless tobacco: Never   Vaping Use    Vaping Use: Former   Substance and Sexual Activity    Alcohol use: Not Currently     Alcohol/week: 42.0 standard drinks of alcohol     Types: 42 Cans of beer per week     Comment: no drinks for 16 months    Drug use: No    Sexual activity: Not Currently     Partners: Female         Current Outpatient Medications:     albuterol (PROVENTIL HFA,VENTOLIN HFA) 90 mcg/act inhaler, INHALE 2 PUFFS BY MOUTH EVERY 6 HOURS AS NEEDED FOR WHEEZING, Disp: 9 g, Rfl: 0    calcium carbonate (OS-KORI) 600 MG tablet, Take 1 tablet (600 mg total) by mouth daily, Disp: 90 tablet, Rfl: 3    Cholecalciferol 25 MCG (1000 UT) tablet, Take 1 tablet (1,000 Units total) by mouth daily, Disp: 30 tablet, Rfl: 2    cyanocobalamin (VITAMIN B-12) 100 mcg tablet, Take 1 tablet (100 mcg total) by mouth daily, Disp: 30 tablet, Rfl: 5    Diclofenac Sodium (VOLTAREN) 1 %, Apply 2 g topically 4 (four) times a day, Disp: 100 g, Rfl: 1    docusate sodium (COLACE) 100 mg capsule, Take 1 capsule (100 mg total) by mouth 2 (two) times a day as needed for constipation, Disp: 30 capsule, Rfl: 0    doxepin (SINEquan) 50 mg capsule, Take 1 capsule (50 mg total) by mouth daily at bedtime, Disp: 30 capsule, Rfl: 0    ergocalciferol (VITAMIN D2) 50,000 units, Take 1 capsule (50,000 Units total) by mouth once a week, Disp: 13 capsule, Rfl: 1    folic acid (FOLVITE) 1 mg tablet, Take 1 tablet by mouth once daily, Disp: 30 tablet, Rfl: 0 levETIRAcetam (KEPPRA) 750 mg tablet, Take 1 tablet (750 mg total) by mouth every 12 (twelve) hours, Disp: 60 tablet, Rfl: 1    lisinopril (ZESTRIL) 2.5 mg tablet, Take 1 tablet (2.5 mg total) by mouth daily, Disp: 90 tablet, Rfl: 2    Multiple Vitamin (TAB-A-BONI PO), Take 1 tablet by mouth daily, Disp: , Rfl:     omeprazole (PriLOSEC) 40 MG capsule, Take 1 capsule (40 mg total) by mouth 2 (two) times a day, Disp: 120 capsule, Rfl: 0    oxyCODONE (Roxicodone) 5 immediate release tablet, Take 1 tablet (5 mg total) by mouth every 4 (four) hours as needed for moderate pain Max Daily Amount: 30 mg Do not start before October 1, 2023., Disp: 120 tablet, Rfl: 0    pregabalin (LYRICA) 75 mg capsule, Take 1 capsule (75 mg total) by mouth 3 (three) times a day, Disp: 90 capsule, Rfl: 1    sodium chloride 1 g tablet, Take 2 tablets (2 g total) by mouth 3 (three) times a day with meals, Disp: 240 tablet, Rfl: 3    thiamine (VITAMIN B1) 100 mg tablet, Take 1 tablet (100 mg total) by mouth daily, Disp: 90 tablet, Rfl: 1    thiamine 100 MG tablet, Take 100 mg by mouth daily, Disp: , Rfl:     varenicline 0.5 MG X 11 & 1 MG X 42 tablet therapy pack, Take 0.5 mg by mouth daily for 3 days, THEN 0.5 mg 2 (two) times a day for 4 days, THEN 1 mg 2 (two) times a day., Disp: 53 each, Rfl: 0    hydrOXYzine HCL (ATARAX) 50 mg tablet, , Disp: , Rfl:     No Known Allergies    Physical Exam:    /87   Pulse 66   Resp 16   Ht 5' 4" (1.626 m)   Wt 59.9 kg (132 lb)   BMI 22.66 kg/m²     Constitutional:normal, well developed, well nourished, alert, in no distress and non-toxic and no overt pain behavior.   Eyes:anicteric  HEENT:grossly intact  Neck:supple, symmetric, trachea midline and no masses   Pulmonary:even and unlabored  Cardiovascular:No edema or pitting edema present  Skin:Normal without rashes or lesions and well hydrated  Psychiatric:Mood and affect appropriate  Neurologic:Cranial Nerves II-XII grossly intact  Musculoskeletal:normal    Lumbar/Sacral Spine examination demonstrates. Full range of motion lumbar spine with pain upon: flexion, lateral rotation to the left/right, and bending to the left/right. Bilateral lumbar paraspinals tender to palpation. Muscle spasms noted in the lumbar area bilaterally. 4/5 lower extremity strength in all muscle groups bilaterally. Positive seated straight leg raise for bilateral lower extremities. Sensitivity to light touch intact bilateral lower extremities. 2+ reflexes in the patella and Achilles. No ankle clonus    Imaging      Study Result    Narrative & Impression   MRI CERVICAL SPINE WITHOUT CONTRAST     INDICATION: M54.12: Radiculopathy, cervical region  Z98.1: Arthrodesis status. COMPARISON: 8/12/2014 and CT from 5/20/2022     TECHNIQUE:  Multiplanar, multisequence imaging of the cervical spine was performed. .        IMAGE QUALITY:  Diagnostic     FINDINGS:     ALIGNMENT: The patient is status post anterior cervical discectomy and spinal fusion from C5-C7. Patient is also status post posterior spinal fusion from C4-T1. There is trace anterolisthesis at C7-T1. MARROW SIGNAL:  Normal marrow signal is identified within the visualized bony structures. No discrete marrow lesion. CERVICAL AND VISUALIZED THORACIC CORD:  Normal signal within the visualized cord. PREVERTEBRAL AND PARASPINAL SOFT TISSUES:  Normal.     VISUALIZED POSTERIOR FOSSA:  The visualized posterior fossa demonstrates no abnormal signal.     CERVICAL DISC SPACES:     C2-C3: There is a disc osteophyte complex with uncovertebral spurring. There is hypertrophy of the posterior arch. There is moderate canal stenosis with contact of the cord. There is mild to moderate foraminal narrowing bilaterally. C3-C4: There is a disc osteophyte complex with uncovertebral spurring. There is hypertrophy of the posterior arch. There is mild to moderate canal stenosis.  There is mild to moderate foraminal narrowing. C4-C5: Canal is decompressed by laminectomy. No significant foraminal narrowing. C5-C6: Canal is decompressed by laminectomy. No significant foraminal narrowing. C6-C7: Canal is decompressed by laminectomy. No significant foraminal narrowing. C7-T1: No significant canal stenosis or foraminal narrowing. Canal is decompressed by laminectomy. UPPER THORACIC DISC SPACES:  Normal.     OTHER FINDINGS:  None. IMPRESSION:     Postoperative changes and multilevel cervical spondylosis, as described above. Interval development of cervical spondylosis at C2-C3 and C3-C4 since MRI from 8/12/2014 as described above. No intrinsic cord signal abnormality seen. Workstation performed: [id number]          Study Result    Narrative & Impression   LUMBAR SPINE     INDICATION:   R52: Pain, unspecified. COMPARISON: 5/9/2023     VIEWS:  XR SPINE LUMBAR 2 OR 3 VIEWS INJURY        FINDINGS:     There are 5 non rib bearing lumbar vertebral bodies. Mild L3 compression deformity. Alignment is unremarkable without dynamic instability. Age-appropriate lumbar degenerative changes are seen. The pedicles appear intact. There are atherosclerotic calcifications. Soft tissues are otherwise unremarkable. IMPRESSION:  Mild L3 compression deformity. Correlate with clinical exam. Degenerative changes as described. The study was marked in EPIC for significant notification. Workstation performed: [id number]     No orders to display       No orders of the defined types were placed in this encounter.

## 2023-10-11 NOTE — PATIENT INSTRUCTIONS
Opioid Safety   WHAT YOU NEED TO KNOW:   An opioid medicine is used to treat pain. Examples are oxycodone, morphine, fentanyl, or codeine. Pain control and management may help you rest, heal, and return to your daily activities. You and your family will receive information about how to manage your pain at home. The instructions will include what to do if you have side effects as your pain is managed. You will get information on how to handle opioid medicine safely. You will also get suggestions on how to control pain without opioids. It is important to follow all instructions so your pain is managed effectively. DISCHARGE INSTRUCTIONS:   Call your local emergency number (911 in the ), or have someone else call if:   You have a seizure. You cannot be woken. You have trouble staying awake and your breathing is slow or shallow. Your speech is slurred, or you are confused. You are dizzy or stumble when you walk. Call your doctor, or have someone close to you call if:   You are extremely drowsy, or you have trouble staying awake or speaking. You have pale or clammy skin. You have blue fingernails or lips. Your heartbeat is slower than normal.    You cannot stop vomiting. You have questions or concerns about your condition or care. Use opioids safely:   Take prescribed opioids exactly as directed. Opioids come with directions based on the kind and how it is given. Talk to your healthcare provider or a pharmacist if you have any questions. Do not take more than the recommended amount. Too much can cause a life-threatening overdose. Do not continue to take it after your pain stops. You may develop tolerance. This means you keep needing higher doses to get the same effect. You may also develop opioid use disorder. This means you are not able to control your opioid use. Do not give opioids to others or take opioids that belong to someone else.   The kind or amount one person takes may not be right for another. The person you share them with may also be taking medicines that do not mix with opioids. The person may drink alcohol or use other drugs that can cause life-threatening problems when mixed with opioids. Do not mix opioids with other medicines or alcohol. The combination can cause an overdose, or cause you to stop breathing. Alcohol, sleeping pills, and medicines such as antihistamines can make you sleepy. A combination with opioids can lead to a coma. Do not drive or operate heavy machinery after you use an opioid. You may feel drowsy or have trouble concentrating. You can injure yourself or others if you drive or use heavy machinery when you are not alert. Your provider or pharmacist can tell you how long to wait after a dose before you do these activities. Talk to your healthcare provider if you have any side effects. Side effects include nausea, sleepiness, itching, and trouble thinking clearly. Your provider may need to make changes to the kind or amount of opioid you are taking. Your provider can also help you find ways to prevent or relieve side effects. Manage constipation:  Constipation is the most common side effect of opioid medicine. Constipation is when you have hard, dry bowel movements, or you go longer than usual between bowel movements. Tell your healthcare provider about all changes in your bowel movements while you are taking opioids. Your provider may recommend laxative medicine to help you have a bowel movement. Your provider may also change the kind of opioid you are taking, or change when you take it. The following are more ways you can prevent or relieve constipation:  Drink liquids as directed. You may need to drink extra liquids to help soften and move your bowels. Ask how much liquid to drink each day and which liquids are best for you. Eat high-fiber foods. This may help decrease constipation by adding bulk to your bowel movements.  High-fiber foods include fruits, vegetables, whole-grain breads and cereals, and beans. Your healthcare provider or dietitian can help you create a high-fiber meal plan. Your provider may also recommend a fiber supplement if you cannot get enough fiber from food. Exercise regularly. Regular physical activity can help stimulate your intestines. Walking is a good exercise to prevent or relieve constipation. Ask which exercises are best for you. Schedule a time each day to have a bowel movement. This may help train your body to have regular bowel movements. Bend forward while you are on the toilet to help move the bowel movement out. Sit on the toilet for at least 10 minutes, even if you do not have a bowel movement. Store opioids safely:   Store opioids where others cannot easily get them. Keep them in a locked cabinet or secure area. Do not  keep them in a purse or other bag you carry with you. A person may be looking for something else and find the opioids. Make sure opioids are stored out of the reach of children. A child can easily overdose on opioids. Opioids may look like candy to a small child. The best way to dispose of opioids: The laws vary by country and area. In the [facility], the best way is to return the opioids through a take-back program. This program is offered by the [employer] ([employer]). The following are options for using the program:  Take the opioids to a [employer] collection site. The site is often a law enforcement center. Call your local law enforcement center for scheduled take-back days in your area. You will be given information on where to go if the collection site is in a different location. Take the opioids to an approved pharmacy or hospital.  A pharmacy or hospital may be set up as a collection site. You will need to ask if it is a [employer] collection site if you were not directed there.  A pharmacy or doctor's office may not be able to take back opioids unless it is a [employer] site. Use a mail-back system. This means you are given containers to put the opioids into. You will then mail them in the containers. Use a take-back drop box. This is a place to leave the opioids at any time. People and animals will not be able to get into the box. Your local law enforcement agency can tell you where to find a drop box in your area. Other safe ways to dispose of opioids: The medicine may come with disposal instructions. The instructions may vary depending on the brand of medicine you are using. Instructions may come in a Medication Guide, but not every medicine has one. You may instead get instructions from your pharmacy or doctor. Follow instructions carefully. The following are general guidelines to follow:  Find out if you can flush the opioid. Some opioids can be flushed down the toilet or poured into the sink. You will need to contact authorities in your area to see if this is an option for you. The FDA also offers a list of medicines that are safe to flush down the toilet. You can check the list if you cannot get the information for your local area. Ask your waste management company about rules for putting opioids in the trash. The company will be able to give you specific directions. Scratch out personal information on the original medicine label so it cannot be read. Then put it in the trash. Do not label the trash or put any information on it about the opioids. It should look like regular household trash so no one is tempted to look for the opioids. Keep the trash out of the reach of children and animals. Always make sure trash is secure. Talk to officials if you live in a facility. If you live in a nursing home or assisted living center, talk to an official. The person will know the rules for your area. Other ways to manage pain:   Ask your healthcare provider about non-opioid medicines to control pain.   Some medicines may even work better than opioids, depending on the cause of your pain. Nonprescription medicines include NSAIDs (such as ibuprofen) and acetaminophen. Prescription medicines include muscle relaxers, antidepressants, and steroids. Pain may be managed without any medicines. Some ways to relieve pain include massage, aromatherapy, or meditation. Physical or occupational therapy may also help. Follow up with your doctor or pain specialist as directed: You may need to have your dose adjusted. Your doctor or pain specialist can also help you find ways to manage pain without opioids. Write down your questions so you remember to ask them during your visits. For more information:   Drug Enforcement Administration  [address] [facility] , [address] [facility]  Phone: 7- [phone] - [phone]  Web Address: [employer].gov/drug_disposal/    [address] [facility] [address] and Drug Administration  [address] , [address]  Phone: 1- 350 - 564-0279  Web Address: http://[employer]/  © Copyright Gina Mar 2023 Information is for End User's use only and may not be sold, redistributed or otherwise used for commercial purposes. The above information is an  only. It is not intended as medical advice for individual conditions or treatments. Talk to your doctor, nurse or pharmacist before following any medical regimen to see if it is safe and effective for you.

## 2023-10-13 ENCOUNTER — OFFICE VISIT (OUTPATIENT)
Dept: FAMILY MEDICINE CLINIC | Facility: CLINIC | Age: 58
End: 2023-10-13
Payer: COMMERCIAL

## 2023-10-13 VITALS
HEIGHT: 64 IN | DIASTOLIC BLOOD PRESSURE: 62 MMHG | BODY MASS INDEX: 22.81 KG/M2 | HEART RATE: 75 BPM | SYSTOLIC BLOOD PRESSURE: 112 MMHG | TEMPERATURE: 96.5 F | WEIGHT: 133.6 LBS | OXYGEN SATURATION: 98 %

## 2023-10-13 DIAGNOSIS — J44.1 CHRONIC OBSTRUCTIVE PULMONARY DISEASE WITH ACUTE EXACERBATION (HCC): ICD-10-CM

## 2023-10-13 DIAGNOSIS — F10.11 HISTORY OF ALCOHOL ABUSE: ICD-10-CM

## 2023-10-13 DIAGNOSIS — M17.10 PRIMARY OSTEOARTHRITIS OF KNEE, UNSPECIFIED LATERALITY: ICD-10-CM

## 2023-10-13 DIAGNOSIS — F51.04 PSYCHOPHYSIOLOGICAL INSOMNIA: Primary | ICD-10-CM

## 2023-10-13 DIAGNOSIS — G40.909 SEIZURE DISORDER (HCC): ICD-10-CM

## 2023-10-13 DIAGNOSIS — E53.8 FOLIC ACID DEFICIENCY: ICD-10-CM

## 2023-10-13 LAB
6MAM UR QL CFM: NEGATIVE NG/ML
7AMINOCLONAZEPAM UR QL CFM: NEGATIVE NG/ML
A-OH ALPRAZ UR QL CFM: NEGATIVE NG/ML
ACCEPTABLE CREAT UR QL: ABNORMAL MG/DL
ACCEPTIBLE SP GR UR QL: NORMAL
AMITRIP UR QL CFM: NEGATIVE NG/ML
AMPHET UR QL CFM: NEGATIVE NG/ML
AMPHET UR QL CFM: NEGATIVE NG/ML
BUPRENORPHINE UR QL CFM: NEGATIVE NG/ML
BZE UR QL CFM: NEGATIVE NG/ML
DESIPRAMINE UR QL CFM: NEGATIVE NG/ML
EDDP UR QL CFM: NEGATIVE NG/ML
ETHYL GLUCURONIDE UR QL CFM: ABNORMAL NG/ML
ETHYL SULFATE UR QL SCN: NEGATIVE NG/ML
FENTANYL UR QL CFM: NEGATIVE NG/ML
FLUOXETINE UR QL CFM: NEGATIVE NG/ML
GLIADIN IGG SER IA-ACNC: NEGATIVE NG/ML
GLUCOSE 30M P 50 G LAC PO SERPL-MCNC: NEGATIVE NG/ML
HYDROCODONE UR QL CFM: NEGATIVE NG/ML
HYDROMORPHONE UR QL CFM: NEGATIVE NG/ML
LORAZEPAM UR QL CFM: NEGATIVE NG/ML
MDMA UR QL CFM: NEGATIVE NG/ML
ME-PHENIDATE UR QL CFM: NEGATIVE NG/ML
MEPERIDINE UR QL CFM: NEGATIVE NG/ML
METHADONE UR QL CFM: NEGATIVE NG/ML
METHAMPHET UR QL CFM: NEGATIVE NG/ML
MORPHINE UR QL CFM: NEGATIVE NG/ML
NITRITE UR QL: NORMAL UG/ML
NORBUPRENORPHINE UR QL CFM: NEGATIVE NG/ML
NORDIAZEPAM UR QL CFM: NEGATIVE NG/ML
NORFENTANYL UR QL CFM: NEGATIVE NG/ML
NORFLUOXETINE UR QL CFM: NEGATIVE NG/ML
NORHYDROCODONE UR QL CFM: NEGATIVE NG/ML
NORMEPERIDINE UR QL CFM: NEGATIVE NG/ML
NOROXYCODONE UR QL CFM: NORMAL NG/ML
NORTRIP UR QL CFM: NEGATIVE NG/ML
OLANZAPINE QUANTIFICATION: NEGATIVE NG/ML
OPC-3373 QUANTIFICATION: NEGATIVE
OXAZEPAM UR QL CFM: NEGATIVE NG/ML
OXYCODONE UR QL CFM: NORMAL NG/ML
OXYMORPHONE UR QL CFM: NORMAL NG/ML
OXYMORPHONE UR QL CFM: NORMAL NG/ML
PARA-FLUOROFENTANYL QUANTIFICATION: NORMAL NG/ML
PROLACTIN SERPL-MCNC: NEGATIVE NG/ML
RESULT ALL_PRESCRIBED MEDS AND SPECIAL INSTRUCTIONS: NORMAL
SECOBARBITAL UR QL CFM: NEGATIVE NG/ML
SL AMB 4-ANPP QUANTIFICATION: NORMAL NG/ML
SL AMB 7-OH-MITRAGYNINE (KRATOM ALKALOID) QUANTIFICATION: NEGATIVE NG/ML
SL AMB ACETYL FENTANYL QUANTIFICATION: NORMAL NG/ML
SL AMB ACETYL NORFENTANYL QUANTIFICATION: NORMAL NG/ML
SL AMB ACRYL FENTANYL QUANTIFICATION: NORMAL NG/ML
SL AMB CARFENTANIL QUANTIFICATION: NORMAL NG/ML
SL AMB CITALOPRAM/ESCITALOPRAM QUANTIFICATION: NEGATIVE NG/ML
SL AMB CITALOPRAM/ESCITALOPRAM QUANTIFICATION: NEGATIVE NG/ML
SL AMB CLOZAPINE QUANTIFICATION: NEGATIVE NG/ML
SL AMB CTHC (MARIJUANA METABOLITE) QUANTIFICATION: NEGATIVE NG/ML
SL AMB DEXTRORPHAN (DEXTROMETHORPHAN METABOLITE) QUANT: NEGATIVE NG/ML
SL AMB HALOPERIDOL  QUANTIFICATION: NEGATIVE NG/ML
SL AMB HALOPERIDOL METABOLITE QUANTIFICATION: NEGATIVE NG/ML
SL AMB HYDROXYBUPROPION QUANTIFICATION: NEGATIVE NG/ML
SL AMB HYDROXYRISPERIDONE QUANTIFICATION: NEGATIVE NG/ML
SL AMB N-DESMETHYL-TRAMADOL QUANTIFICATION: NEGATIVE NG/ML
SL AMB N-DESMETHYLCLOZAPINE QUANTIFICATION: NEGATIVE NG/ML
SL AMB NORQUETIAPINE QUANTIFICATION: NEGATIVE NG/ML
SL AMB PHENTERMINE QUANTIFICATION: NEGATIVE NG/ML
SL AMB PREGABALIN QUANTIFICATION: NORMAL
SL AMB QUETIAPINE QUANTIFICATION: NEGATIVE NG/ML
SL AMB RISPERIDONE QUANTIFICATION: NEGATIVE NG/ML
SL AMB RITALINIC ACID QUANTIFICATION: NEGATIVE NG/ML
SPECIMEN PH ACCEPTABLE UR: NORMAL
TAPENTADOL UR QL CFM: NEGATIVE NG/ML
TEMAZEPAM UR QL CFM: NEGATIVE NG/ML
TEMAZEPAM UR QL CFM: NEGATIVE NG/ML
TRAMADOL UR QL CFM: NEGATIVE NG/ML
URATE/CREAT 24H UR: NEGATIVE NG/ML

## 2023-10-13 PROCEDURE — T1015 CLINIC SERVICE: HCPCS | Performed by: FAMILY MEDICINE

## 2023-10-13 RX ORDER — UBIDECARENONE 75 MG
100 CAPSULE ORAL DAILY
Qty: 90 TABLET | Refills: 2 | Status: SHIPPED | OUTPATIENT
Start: 2023-10-13

## 2023-10-13 RX ORDER — DOXEPIN HYDROCHLORIDE 50 MG/1
50 CAPSULE ORAL
Qty: 30 CAPSULE | Refills: 0 | Status: SHIPPED | OUTPATIENT
Start: 2023-10-13

## 2023-10-13 RX ORDER — LEVETIRACETAM 750 MG/1
750 TABLET ORAL EVERY 12 HOURS
Qty: 180 TABLET | Refills: 2 | Status: SHIPPED | OUTPATIENT
Start: 2023-10-13

## 2023-10-13 RX ORDER — FOLIC ACID 1 MG/1
1000 TABLET ORAL DAILY
Qty: 30 TABLET | Refills: 0 | Status: SHIPPED | OUTPATIENT
Start: 2023-10-13

## 2023-10-13 RX ORDER — UMECLIDINIUM BROMIDE AND VILANTEROL TRIFENATATE 62.5; 25 UG/1; UG/1
1 POWDER RESPIRATORY (INHALATION) DAILY
Qty: 60 BLISTER | Refills: 0 | Status: SHIPPED | OUTPATIENT
Start: 2023-10-13 | End: 2023-11-12

## 2023-10-13 NOTE — PROGRESS NOTES
Assessment/Plan:     Diagnoses and all orders for this visit:    Psychophysiological insomnia  Comments:  Has stopped alprazolam  Continue doxepin  Refill provided today  Orders:  -     doxepin (SINEquan) 50 mg capsule; Take 1 capsule (50 mg total) by mouth daily at bedtime    Folic acid deficiency  -     folic acid (FOLVITE) 1 mg tablet; Take 1 tablet (1,000 mcg total) by mouth daily    History of alcohol abuse  -     Cholecalciferol 25 MCG (1000 UT) tablet; Take 1 tablet (1,000 Units total) by mouth daily  -     cyanocobalamin (VITAMIN B-12) 100 mcg tablet; Take 1 tablet (100 mcg total) by mouth daily    Primary osteoarthritis of knee, unspecified laterality  Comments:  Cont topical voltaren   advised home exercises and stretches  Orders:  -     Diclofenac Sodium (VOLTAREN) 1 %; Apply 2 g topically 4 (four) times a day    Seizures (HCC)  Comments:  Keppra reordered  Orders:  -     levETIRAcetam (KEPPRA) 750 mg tablet; Take 1 tablet (750 mg total) by mouth every 12 (twelve) hours    Chronic obstructive pulmonary disease with acute exacerbation (HCC)  Comments:  Using albuterol frequently  Start Anoro daily  RTC 1 month for evaluation  Orders:  -     umeclidinium-vilanterol (Anoro Ellipta) 62.5-25 mcg/actuation inhaler; Inhale 1 puff daily            Return in about 4 weeks (around 11/10/2023) for Recheck COPD. Subjective:        Patient ID: Allen Prince is a 63 y.o. male. Chief Complaint   Patient presents with    Follow-up     Insomnia       PMH alcoholic hepatitis without ascites, fonseca's esophagus, pancreatitis, COPD, HTN, seizures, allergic rhinitis, aortic ectasia, and depression presents for follow up of insomnia. Patient has been able to completely stop using alprazolam and is sleeping well with doxepin hs.  Denies any feelings of grogginess in the AM.         The following portions of the patient's history were reviewed and updated as appropriate: allergies, current medications, past family history, past medical history, past social history, past surgical history and problem list.    Patient Active Problem List   Diagnosis    Tobacco abuse    Essential hypertension    H/O suicide attempt    H/O cervical spine surgery    Chronic hyponatremia    Dietary folate deficiency anemia    Ventral hernia without obstruction or gangrene    Hepatomegaly    Hypomagnesemia    Elevated alkaline phosphatase level    Alcoholic hepatitis without ascites    Vitamin D deficiency    Iron deficiency    Urinary retention    Bladder wall thickening    Hypophosphatemia    Generalized weakness    Malnutrition of moderate degree (HCC)    Hyponatremia    Hypokalemia    Continuous chronic alcoholism (HCC)    Incisional hernia    Hx of seizure disorder    Seizure-like activity (HCC)    Diarrhea    Abnormality of pancreatic duct    Allergic rhinitis    Microcytic anemia    Abdominal wound dehiscence    Cervical disc disorder with myelopathy    Cervical spinal stenosis    Depression    Left foot drop    Lumbar radiculopathy    Neuropathy involving both lower extremities    Peripheral neuropathy    T6 vertebral fracture (HCC)    Alcoholic cirrhosis of liver without ascites (HCC)    Thrombocytopenia (HCC)    Closed fracture of left olecranon process, initial encounter    Iron deficiency anemia due to chronic blood loss    Duodenal ulcer    Tubular adenoma    Traore esophagus    Celiac artery stenosis (HCC)    Pancreatic mass    Pancytopenia (HCC)    Tobacco use    Constipation    Transaminitis    Lesion of spleen    Left anterior fascicular block    Abnormal EKG    Acute on chronic pancreatitis (HCC)    Pancreatic pseudocyst    COPD (chronic obstructive pulmonary disease) ([address])    Ileus (HCC)    Ambulatory dysfunction    Current every day smoker    Fall    Hx of duodenal ulcer    Neck pain    Alcohol use    Thoracic compression fracture (HCC)    Aortic ectasia (HCC)    Rib fractures    Seizure disorder ([address])    Hypoxia    Traore's esophagus    Elevated d-dimer    COVID-19 virus infection    Hypocalcemia    Clavicular fracture    Low serum cortisol level    Chronic anemia    Osteoporosis with current pathological fracture    Abnormal CXR    Moderate protein-calorie malnutrition (HCC)    Esophageal abnormality    Insomnia    History of alcohol abuse    Hypoglycemia    Hypotension    Closed compression fracture of third lumbar vertebra (HCC)    Sacral fracture, closed (HCC)    Coagulopathy (HCC)    Closed fracture of transverse process of lumbar vertebra (HCC)    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)    Gastroesophageal reflux disease with esophagitis without hemorrhage    Primary osteoarthritis of knee       Current Outpatient Medications   Medication Sig Dispense Refill    albuterol (PROVENTIL HFA,VENTOLIN HFA) 90 mcg/act inhaler INHALE 2 PUFFS BY MOUTH EVERY 6 HOURS AS NEEDED FOR WHEEZING 9 g 0    calcium carbonate (OS-KOIR) 600 MG tablet Take 1 tablet (600 mg total) by mouth daily 90 tablet 3    Cholecalciferol 25 MCG (1000 UT) tablet Take 1 tablet (1,000 Units total) by mouth daily 90 tablet 2    cyanocobalamin (VITAMIN B-12) 100 mcg tablet Take 1 tablet (100 mcg total) by mouth daily 90 tablet 2    Diclofenac Sodium (VOLTAREN) 1 % Apply 2 g topically 4 (four) times a day 350 g 2    doxepin (SINEquan) 50 mg capsule Take 1 capsule (50 mg total) by mouth daily at bedtime 30 capsule 0    folic acid (FOLVITE) 1 mg tablet Take 1 tablet (1,000 mcg total) by mouth daily 30 tablet 0    levETIRAcetam (KEPPRA) 750 mg tablet Take 1 tablet (750 mg total) by mouth every 12 (twelve) hours 180 tablet 2    lisinopril (ZESTRIL) 2.5 mg tablet Take 1 tablet (2.5 mg total) by mouth daily 90 tablet 2    Multiple Vitamin (TAB-A-BONI PO) Take 1 tablet by mouth daily      omeprazole (PriLOSEC) 40 MG capsule Take 1 capsule (40 mg total) by mouth 2 (two) times a day 120 capsule 0    oxyCODONE (Roxicodone) 5 immediate release tablet Take 1 tablet (5 mg total) by mouth every 4 (four) hours as needed for moderate pain Max Daily Amount: 30 mg 120 tablet 0    [START ON 11/10/2023] oxyCODONE (Roxicodone) 5 immediate release tablet Take 1 tablet (5 mg total) by mouth every 6 (six) hours as needed for moderate pain Max Daily Amount: 20 mg Do not start before November 10, 2023. 120 tablet 0    pregabalin (LYRICA) 100 mg capsule Take 1 capsule (100 mg total) by mouth 3 (three) times a day 90 capsule 0    sodium chloride 1 g tablet Take 2 tablets (2 g total) by mouth 3 (three) times a day with meals 240 tablet 3    thiamine (VITAMIN B1) 100 mg tablet Take 1 tablet (100 mg total) by mouth daily 90 tablet 1    thiamine 100 MG tablet Take 100 mg by mouth daily      umeclidinium-vilanterol (Anoro Ellipta) 62.5-25 mcg/actuation inhaler Inhale 1 puff daily 60 blister 0     No current facility-administered medications for this visit. Past Medical History:   Diagnosis Date    Alcohol abuse     Traore esophagus     Bowel obstruction (HCC)     Bowel perforation (HCC)     Cardiac disease     Continuous chronic alcoholism ([address]) 10/5/2017    COPD (chronic obstructive pulmonary disease) (HCC)     History of shoulder surgery     Right shoulder    History of transfusion     Hx of cervical spine surgery     Hypertension     Incisional hernia 10/8/2017    MI, old     Mitral regurgitation     Psychiatric disorder     Seizures ([address])         Past Surgical History:   Procedure Laterality Date    APPENDECTOMY      BACK SURGERY      CHOLECYSTECTOMY      ESOPHAGOGASTRODUODENOSCOPY N/A 11/28/2016    Procedure: ESOPHAGOGASTRODUODENOSCOPY (EGD); Surgeon: Eligio Liz MD;  Location: [facility];   Service:     GALLBLADDER SURGERY      LAPAROTOMY N/A 10/25/2016    Procedure: LAPAROTOMY EXPLORATORY;  Surgeon: Zeny Elias MD;  Location: [facility];  Service:     [facility] Right     SHOULDER SURGERY      SMALL INTESTINE SURGERY      STOMACH SURGERY      bal surgery        Social History     Socioeconomic History    Marital status: Single     Spouse name: Not on file    Number of children: Not on file    Years of education: Not on file    Highest education level: Not on file   Occupational History    Not on file   Tobacco Use    Smoking status: Every Day     Packs/day: 1.00     Years: 35.00     Total pack years: 35.00     Types: Cigarettes    Smokeless tobacco: Never   Vaping Use    Vaping Use: Never used   Substance and Sexual Activity    Alcohol use: Not Currently     Alcohol/week: 42.0 standard drinks of alcohol     Types: 42 Cans of beer per week     Comment: no drinks for 16 months    Drug use: No    Sexual activity: Not Currently     Partners: Female   Other Topics Concern    Not on file   Social History Narrative    Not on file     Social Determinants of Health     Financial Resource Strain: Not on file   Food Insecurity: No Food Insecurity (2/27/2023)    Hunger Vital Sign     Worried About Running Out of Food in the Last Year: Never true     Ran Out of Food in the Last Year: Never true   Transportation Needs: No Transportation Needs (2/27/2023)    PRAPARE - Transportation     Lack of Transportation (Medical): No     Lack of Transportation (Non-Medical): No   Physical Activity: Not on file   Stress: Not on file   Social Connections: Not on file   Intimate Partner Violence: Not on file   Housing Stability: Low Risk  (2/27/2023)    Housing Stability Vital Sign     Unable to Pay for Housing in the Last Year: No     Number of Places Lived in the Last Year: 1     Unstable Housing in the Last Year: No      .  Review of Systems   Constitutional:  Negative for activity change, appetite change and fatigue. Respiratory:  Negative for cough and shortness of breath. Cardiovascular:  Negative for chest pain. Gastrointestinal:  Negative for abdominal pain, constipation and diarrhea.    Genitourinary:  Negative for difficulty urinating. Musculoskeletal:  Positive for back pain (chronic; follows with pain mgmt). Neurological:  Negative for dizziness and headaches. Psychiatric/Behavioral:  Negative for confusion and sleep disturbance. Objective:      /62 (BP Location: Left arm, Patient Position: Sitting, Cuff Size: Standard)   Pulse 75   Temp (!) 96.5 °F (35.8 °C) (Tympanic)   Ht 5' 4" (1.626 m)   Wt 60.6 kg (133 lb 9.6 oz)   SpO2 98%   BMI 22.93 kg/m²          Physical Exam  Vitals and nursing note reviewed. HENT:      Nose: Nose normal.      Mouth/Throat:      Mouth: Mucous membranes are moist.   Eyes:      Conjunctiva/sclera: Conjunctivae normal.   Cardiovascular:      Rate and Rhythm: Normal rate and regular rhythm. Pulmonary:      Effort: Pulmonary effort is normal.      Breath sounds: Decreased breath sounds present. Abdominal:      General: Abdomen is flat. Bowel sounds are normal.      Palpations: Abdomen is soft. Genitourinary:     Comments: Exam deferred  Skin:     General: Skin is warm and dry. Capillary Refill: Capillary refill takes less than 2 seconds. Neurological:      General: No focal deficit present. Mental Status: He is alert and oriented to person, place, and time.    Psychiatric:         Mood and Affect: Mood normal.         Behavior: Behavior normal.

## 2023-10-16 ENCOUNTER — TELEPHONE (OUTPATIENT)
Dept: ENDOCRINOLOGY | Facility: CLINIC | Age: 58
End: 2023-10-16

## 2023-10-16 ENCOUNTER — HOSPITAL ENCOUNTER (OUTPATIENT)
Dept: INFUSION CENTER | Facility: HOSPITAL | Age: 58
Discharge: HOME | End: 2023-10-16

## 2023-10-16 VITALS
TEMPERATURE: 96.7 F | SYSTOLIC BLOOD PRESSURE: 121 MMHG | DIASTOLIC BLOOD PRESSURE: 82 MMHG | RESPIRATION RATE: 16 BRPM | HEART RATE: 69 BPM

## 2023-10-16 NOTE — PROGRESS NOTES
Luke text sent to Dr. Fawn Gerard to release hold on Reclast.  Call placed to office [phone] to have hold release. Hold still remains. Patient rescheduled to 10/18/23. Will follow upon hold release by provider.

## 2023-10-16 NOTE — TELEPHONE ENCOUNTER
Pt is at infusion center and they need to to release the order for reclast as it was put on hold when he was in the hospital

## 2023-10-17 ENCOUNTER — HOSPITAL ENCOUNTER (OUTPATIENT)
Dept: INFUSION CENTER | Facility: HOSPITAL | Age: 58
Discharge: HOME | End: 2023-10-17
Attending: STUDENT IN AN ORGANIZED HEALTH CARE EDUCATION/TRAINING PROGRAM
Payer: COMMERCIAL

## 2023-10-17 VITALS
TEMPERATURE: 95.2 F | DIASTOLIC BLOOD PRESSURE: 94 MMHG | HEART RATE: 68 BPM | SYSTOLIC BLOOD PRESSURE: 165 MMHG | RESPIRATION RATE: 18 BRPM

## 2023-10-17 DIAGNOSIS — M80.00XS OSTEOPOROSIS WITH CURRENT PATHOLOGICAL FRACTURE, UNSPECIFIED OSTEOPOROSIS TYPE, SEQUELA: Primary | ICD-10-CM

## 2023-10-17 RX ORDER — SODIUM CHLORIDE 9 MG/ML
20 INJECTION, SOLUTION INTRAVENOUS ONCE
Status: COMPLETED | OUTPATIENT
Start: 2023-10-17 | End: 2023-10-17

## 2023-10-17 RX ORDER — ZOLEDRONIC ACID 5 MG/100ML
5 INJECTION, SOLUTION INTRAVENOUS ONCE
Status: COMPLETED | OUTPATIENT
Start: 2023-10-17 | End: 2023-10-17

## 2023-10-17 RX ORDER — ZOLEDRONIC ACID 5 MG/100ML
5 INJECTION, SOLUTION INTRAVENOUS ONCE
OUTPATIENT
Start: 2024-08-31

## 2023-10-17 RX ORDER — SODIUM CHLORIDE 9 MG/ML
20 INJECTION, SOLUTION INTRAVENOUS ONCE
OUTPATIENT
Start: 2024-08-31

## 2023-10-17 RX ADMIN — ZOLEDRONIC ACID 5 MG: 5 INJECTION, SOLUTION INTRAVENOUS at 12:32

## 2023-10-17 RX ADMIN — SODIUM CHLORIDE 20 ML/HR: 0.9 INJECTION, SOLUTION INTRAVENOUS at 12:31

## 2023-10-17 NOTE — PLAN OF CARE
Problem: INFECTION - ADULT  Goal: Absence of fever/infection during neutropenic period  Description: INTERVENTIONS:  - Monitor WBC    10/17/2023 1622 by Luzma Sparrow RN  Outcome: Progressing  10/17/2023 1354 by Luzma Sparrow RN  Outcome: Progressing

## 2023-10-18 ENCOUNTER — HOSPITAL ENCOUNTER (OUTPATIENT)
Dept: INFUSION CENTER | Facility: HOSPITAL | Age: 58
Discharge: HOME | End: 2023-10-18
Attending: STUDENT IN AN ORGANIZED HEALTH CARE EDUCATION/TRAINING PROGRAM

## 2023-10-23 DIAGNOSIS — K21.00 GASTROESOPHAGEAL REFLUX DISEASE WITH ESOPHAGITIS WITHOUT HEMORRHAGE: ICD-10-CM

## 2023-10-23 RX ORDER — OMEPRAZOLE 40 MG/1
40 CAPSULE, DELAYED RELEASE ORAL 2 TIMES DAILY
Qty: 120 CAPSULE | Refills: 0 | Status: SHIPPED | OUTPATIENT
Start: 2023-10-23 | End: 2023-12-22

## 2023-10-23 NOTE — TELEPHONE ENCOUNTER
Hi this is Gena Mondragon Date of birth [birth year]. Phone number is [phone] calling to see if the doctor has signed my 2 forms. I dropped off on Friday and also to check on getting a refill of my omeprazole. Thank you. Have a good day. Patient called looking to see if paperwork was filled out that he dropped off on Friday?

## 2023-10-24 ENCOUNTER — TELEPHONE (OUTPATIENT)
Dept: FAMILY MEDICINE CLINIC | Facility: CLINIC | Age: 58
End: 2023-10-24

## 2023-10-26 ENCOUNTER — TELEPHONE (OUTPATIENT)
Dept: NEUROLOGY | Facility: CLINIC | Age: 58
End: 2023-10-26

## 2023-10-26 NOTE — TELEPHONE ENCOUNTER
Called pt to let him know that we do fill out the substance abuse forms and that should be completed by his PCP but talon did completed the seizure reporting form and that was sent over the penndot the other day.

## 2023-10-27 ENCOUNTER — TELEPHONE (OUTPATIENT)
Dept: NEUROLOGY | Facility: CLINIC | Age: 58
End: 2023-10-27

## 2023-10-30 ENCOUNTER — TELEPHONE (OUTPATIENT)
Dept: NEUROLOGY | Facility: CLINIC | Age: 58
End: 2023-10-30

## 2023-11-06 ENCOUNTER — TELEPHONE (OUTPATIENT)
Age: 58
End: 2023-11-06

## 2023-11-06 NOTE — TELEPHONE ENCOUNTER
Caller: Jacque Gonzalez, pt    Doctor: [facility]    Reason for call: pt had a form that was completed by Dr. Chele Bocanegra for the [place] substance use. The pt would like to get his 's license re-instated. The pt feels that the dr answered question number 6 wrong and would like someone to call him.     Call back#: [phone]

## 2023-11-06 NOTE — TELEPHONE ENCOUNTER
Pt contacted Call Center requested refill of their medication. Medication Name: Pregablin      Dosage of Med: 100 mg      Frequency of Med: 3 x daily      Remaining Medication: 2 pills      Pharmacy and Location: [address] [place] [address] Drive  [phone]        Pt. Preferred Callback Phone Number: [phone]    Pt is also requesting if you can add a few refills so he doesn't have to call every month. Thank you. PLEASE ADVISE PATIENTS:    REFILL REQUESTS WILL BE PROCESSED WITHIN 24-48 HOURS.

## 2023-11-07 NOTE — TELEPHONE ENCOUNTER
I called and spoke with patient. We reviewed the substance abuse form that was previously completed by RM. I reviewed LOV with patient and his medications. Patient states that he was only given 42 tablets of the oxycodone and did not receive any more. I informed patient at his last OV he was given 2 rx's for oxycodone with #120 tablets each provided for the month of October and November then return to office in December as a 2M f/u. Patient states that he was never given any prescriptions for oxycodone and he never picked any up from [facility]. Patient is not happy with the way the form was completed and about his medications. Patient states that he told RM that he didn't want to be on opioids anymore because he wanted to get his license back. I informed patient that per his LOV this was not discussed between him and the doctor and if he wishes to be off medication then he will need to come in for a face to face with the doctor so that proper documentation can be made. Patient was scheduled for appt 11/8/23 at 7am. Patient was pleasant and appreciative of the call. I reviewed PDMP and patient only receive oxycodone in September with #42 tablets. I called and spoke with John Tripathi at [facility] DR MEAGHAN CASIANO in [facility], she confirmed that they have 2 oxycodone scripts on file that the patient did NOT pickup his prescriptions. Patient will have OV w/ RM tomorrow to discuss more in detail.

## 2023-11-08 ENCOUNTER — OFFICE VISIT (OUTPATIENT)
Dept: PAIN MEDICINE | Facility: CLINIC | Age: 58
End: 2023-11-08
Payer: COMMERCIAL

## 2023-11-08 VITALS
WEIGHT: 136 LBS | BODY MASS INDEX: 23.22 KG/M2 | HEART RATE: 82 BPM | HEIGHT: 64 IN | RESPIRATION RATE: 16 BRPM | DIASTOLIC BLOOD PRESSURE: 94 MMHG | SYSTOLIC BLOOD PRESSURE: 146 MMHG

## 2023-11-08 DIAGNOSIS — M48.061 SPINAL STENOSIS OF LUMBAR REGION, UNSPECIFIED WHETHER NEUROGENIC CLAUDICATION PRESENT: ICD-10-CM

## 2023-11-08 DIAGNOSIS — M51.36 LUMBAR DEGENERATIVE DISC DISEASE: ICD-10-CM

## 2023-11-08 DIAGNOSIS — M54.12 RADICULOPATHY, CERVICAL REGION: ICD-10-CM

## 2023-11-08 DIAGNOSIS — F11.20 UNCOMPLICATED OPIOID DEPENDENCE (HCC): ICD-10-CM

## 2023-11-08 DIAGNOSIS — M54.12 CERVICAL RADICULOPATHY: ICD-10-CM

## 2023-11-08 DIAGNOSIS — M47.816 LUMBAR SPONDYLOSIS: ICD-10-CM

## 2023-11-08 DIAGNOSIS — J44.1 CHRONIC OBSTRUCTIVE PULMONARY DISEASE WITH ACUTE EXACERBATION (HCC): ICD-10-CM

## 2023-11-08 DIAGNOSIS — G89.4 CHRONIC PAIN SYNDROME: ICD-10-CM

## 2023-11-08 DIAGNOSIS — Z79.891 LONG-TERM CURRENT USE OF OPIATE ANALGESIC: Primary | ICD-10-CM

## 2023-11-08 PROCEDURE — 99214 OFFICE O/P EST MOD 30 MIN: CPT | Performed by: ANESTHESIOLOGY

## 2023-11-08 RX ORDER — ALBUTEROL SULFATE 90 UG/1
2 AEROSOL, METERED RESPIRATORY (INHALATION) EVERY 6 HOURS PRN
Qty: 9 G | Refills: 0 | Status: SHIPPED | OUTPATIENT
Start: 2023-11-08

## 2023-11-08 RX ORDER — PREGABALIN 100 MG/1
100 CAPSULE ORAL 3 TIMES DAILY
Qty: 90 CAPSULE | Refills: 0 | Status: SHIPPED | OUTPATIENT
Start: 2023-11-08 | End: 2023-12-08

## 2023-11-08 RX ORDER — UMECLIDINIUM BROMIDE AND VILANTEROL TRIFENATATE 62.5; 25 UG/1; UG/1
1 POWDER RESPIRATORY (INHALATION) DAILY
Qty: 60 BLISTER | Refills: 0 | Status: SHIPPED | OUTPATIENT
Start: 2023-11-08 | End: 2023-12-08

## 2023-11-08 NOTE — PROGRESS NOTES
Assessment:  1. Lumbar spondylosis    2. Cervical radiculopathy    3. Radiculopathy, cervical region    4. Lumbar degenerative disc disease    5. Spinal stenosis of lumbar region, unspecified whether neurogenic claudication present        Plan:  Patient is a 50-year-old male complains of arm pain, low back pain, leg pain with chronic pain syndrome secondary to cervical radiculopathy status post ACDF, lumbar radiculopathy presents to office for follow-up visit. Patient reports he no longer needs opioid pain regimen. At this time we will dissolve opioid contract we will continue with help with neuropathic pain. 1.  We will discontinue oxycodone 5 mg p.o. 4 times daily  2. We will continue Lyrica 100 mg p.o. 3 times daily  3. We will follow-up in 2 months           History of Present Illness: The patient is a 63 y.o. male who presents for a follow up office visit in regards to Back Pain. The patient’s current symptoms include 7 out of 10 constant sharp pain which is worse at nighttime. Current pain medications includes: Lyrica 3 times daily. The patient reports that this regimen is providing 30% pain relief. The patient is reporting no side effects from this pain medication regimen. I have personally reviewed and/or updated the patient's past medical history, past surgical history, family history, social history, current medications, allergies, and vital signs today. Review of Systems  Review of Systems   Musculoskeletal:  Positive for back pain, gait problem and myalgias. Decreased ROM  Joint stiffness  pain   All other systems reviewed and are negative.           Past Medical History:   Diagnosis Date    Alcohol abuse     Traore esophagus     Bowel obstruction (HCC)     Bowel perforation (HCC)     Cardiac disease     Continuous chronic alcoholism ([address]) 10/5/2017    COPD (chronic obstructive pulmonary disease) ([facility])     History of shoulder surgery     Right shoulder    History of transfusion     Hx of cervical spine surgery     Hypertension     Incisional hernia 10/8/2017    MI, old     Mitral regurgitation     Psychiatric disorder     Seizures ([address])        Past Surgical History:   Procedure Laterality Date    APPENDECTOMY      BACK SURGERY      CHOLECYSTECTOMY      ESOPHAGOGASTRODUODENOSCOPY N/A 11/28/2016    Procedure: ESOPHAGOGASTRODUODENOSCOPY (EGD); Surgeon: Carlos Hennessy MD;  Location:  GI LAB;   Service:     GALLBLADDER SURGERY      LAPAROTOMY N/A 10/25/2016    Procedure: LAPAROTOMY EXPLORATORY;  Surgeon: James Theodore MD;  Location: [facility];  Service:     MOUTH SURGERY      PERCUTANEOUS PINNING FEMORAL NECK FRACTURE      SHOULDER SURGERY Right     SHOULDER SURGERY      SMALL INTESTINE SURGERY      STOMACH SURGERY      bal surgery       Family History   Problem Relation Age of Onset    Breast cancer Mother     Prostate cancer Father     Skin cancer Brother        Social History     Occupational History    Not on file   Tobacco Use    Smoking status: Every Day     Packs/day: 1.00     Years: 35.00     Total pack years: 35.00     Types: Cigarettes    Smokeless tobacco: Never   Vaping Use    Vaping Use: Never used   Substance and Sexual Activity    Alcohol use: Not Currently     Alcohol/week: 42.0 standard drinks of alcohol     Types: 42 Cans of beer per week     Comment: no drinks for 16 months    Drug use: No    Sexual activity: Not Currently     Partners: Female         Current Outpatient Medications:     albuterol (PROVENTIL HFA,VENTOLIN HFA) 90 mcg/act inhaler, INHALE 2 PUFFS BY MOUTH EVERY 6 HOURS AS NEEDED FOR WHEEZING, Disp: 9 g, Rfl: 0    calcium carbonate (OS-KORI) 600 MG tablet, Take 1 tablet (600 mg total) by mouth daily, Disp: 90 tablet, Rfl: 3    Cholecalciferol 25 MCG (1000 UT) tablet, Take 1 tablet (1,000 Units total) by mouth daily, Disp: 90 tablet, Rfl: 2    cyanocobalamin (VITAMIN B-12) 100 mcg tablet, Take 1 tablet (100 mcg total) by mouth daily, Disp: 90 tablet, Rfl: 2    Diclofenac Sodium (VOLTAREN) 1 %, Apply 2 g topically 4 (four) times a day, Disp: 350 g, Rfl: 2    doxepin (SINEquan) 50 mg capsule, Take 1 capsule (50 mg total) by mouth daily at bedtime, Disp: 30 capsule, Rfl: 0    folic acid (FOLVITE) 1 mg tablet, Take 1 tablet (1,000 mcg total) by mouth daily, Disp: 30 tablet, Rfl: 0    levETIRAcetam (KEPPRA) 750 mg tablet, Take 1 tablet (750 mg total) by mouth every 12 (twelve) hours, Disp: 180 tablet, Rfl: 2    lisinopril (ZESTRIL) 2.5 mg tablet, Take 1 tablet (2.5 mg total) by mouth daily, Disp: 90 tablet, Rfl: 2    Multiple Vitamin (TAB-A-BONI PO), Take 1 tablet by mouth daily, Disp: , Rfl:     omeprazole (PriLOSEC) 40 MG capsule, Take 1 capsule (40 mg total) by mouth 2 (two) times a day, Disp: 120 capsule, Rfl: 0    pregabalin (LYRICA) 100 mg capsule, Take 1 capsule (100 mg total) by mouth 3 (three) times a day, Disp: 90 capsule, Rfl: 0    sodium chloride 1 g tablet, Take 2 tablets (2 g total) by mouth 3 (three) times a day with meals, Disp: 240 tablet, Rfl: 3    thiamine (VITAMIN B1) 100 mg tablet, Take 1 tablet (100 mg total) by mouth daily, Disp: 90 tablet, Rfl: 1    thiamine 100 MG tablet, Take 100 mg by mouth daily, Disp: , Rfl:     umeclidinium-vilanterol (Anoro Ellipta) 62.5-25 mcg/actuation inhaler, Inhale 1 puff daily, Disp: 60 blister, Rfl: 0    oxyCODONE (Roxicodone) 5 immediate release tablet, Take 1 tablet (5 mg total) by mouth every 4 (four) hours as needed for moderate pain Max Daily Amount: 30 mg (Patient not taking: Reported on 11/8/2023), Disp: 120 tablet, Rfl: 0    [START ON 11/10/2023] oxyCODONE (Roxicodone) 5 immediate release tablet, Take 1 tablet (5 mg total) by mouth every 6 (six) hours as needed for moderate pain Max Daily Amount: 20 mg Do not start before November 10, 2023.  (Patient not taking: Reported on 11/8/2023 Do not start before November 10, 2023.), Disp: 120 tablet, Rfl: 0    No Known Allergies    Physical Exam:    BP 146/94   Pulse 82   Resp 16   Ht 5' 4" (1.626 m)   Wt 61.7 kg (136 lb)   BMI 23.34 kg/m²     Constitutional:normal, well developed, well nourished, alert, in no distress and non-toxic and no overt pain behavior. Eyes:anicteric  HEENT:grossly intact  Neck:supple, symmetric, trachea midline and no masses   Pulmonary:even and unlabored  Cardiovascular:No edema or pitting edema present  Skin:Normal without rashes or lesions and well hydrated  Psychiatric:Mood and affect appropriate  Neurologic:Cranial Nerves II-XII grossly intact  Musculoskeletal:normal    Imaging      Study Result    Narrative & Impression   LUMBAR SPINE     INDICATION:   R52: Pain, unspecified. COMPARISON: 5/9/2023     VIEWS:  XR SPINE LUMBAR 2 OR 3 VIEWS INJURY        FINDINGS:     There are 5 non rib bearing lumbar vertebral bodies. Mild L3 compression deformity. Alignment is unremarkable without dynamic instability. Age-appropriate lumbar degenerative changes are seen. The pedicles appear intact. There are atherosclerotic calcifications. Soft tissues are otherwise unremarkable. IMPRESSION:  Mild L3 compression deformity. Correlate with clinical exam. Degenerative changes as described. The study was marked in EPIC for significant notification. Workstation performed: [id number]            No orders to display       No orders of the defined types were placed in this encounter.

## 2023-11-08 NOTE — PATIENT INSTRUCTIONS
Core Strengthening Exercises   WHAT YOU NEED TO KNOW:   What do I need to know about core strengthening exercises? Your core includes the muscles of your lower back, hip, pelvis, and abdomen. Core strengthening exercises help heal and strengthen these muscles. This helps prevent another injury, and keeps your pelvis, spine, and hips in the correct position. What do I need to know about exercise safety? Talk to your healthcare provider before you start an exercise program. A physical therapist can teach you how to do core strengthening exercises safely. Do the exercises on a mat or firm surface. A firm surface will support your spine and prevent low back pain. Do not do these exercises on a bed. Move slowly and smoothly. Avoid fast or jerky motions. Stop if you feel pain. You may feel some discomfort at first, but you should not feel pain. Tell your provider or physical therapist if you have pain while you exercise. Regular exercise will help decrease your discomfort over time. Breathe normally during core exercises. Do not hold your breath. This may cause an increase in blood pressure and prevent muscle strengthening. Your healthcare provider will tell you when to inhale and exhale during the exercise. Begin all of your exercises with abdominal bracing. Abdominal bracing helps warm up your core muscles. You can also practice abdominal bracing throughout the day. Lie on your back with your knees bent and feet flat on the floor. Place your arms in a relaxed position beside your body. Tighten your abdominal muscles. Pull your belly button in and up toward your spine. Hold for 5 seconds. Relax your muscles. Repeat 10 times. How do I perform core strengthening exercises? Your healthcare provider will tell you how often to do these exercises. The provider will also tell you how many repetitions of each exercise you should do. Hold each exercise for 5 seconds or as directed.  As you get stronger, increase your hold to 10 to 15 seconds. You can do some of these exercises on a stability ball, or with a weight. Ask your healthcare provider how to use a stability ball or weight for these exercises:  Bridging:  Lie on your back with your knees bent and feet flat on the floor. Rest your arms at your side. Tighten your buttocks, and then lift your hips 1 inch off the floor. Hold for 5 seconds. When you can do this exercise without pain for 10 seconds, increase the distance you lift your hips. A good goal is to be able to lift your hips so that your shoulders, hips, and knees are in a straight line. Dead bug:  Lie on your back with your knees bent and feet flat on the floor. Place your arms in a relaxed position beside your body. Begin with abdominal bracing. Next, raise one leg, keeping your knee bent. Hold for 5 seconds. Repeat with the other leg. When you can do this exercise without pain for 10 to 15 seconds, you may raise one straight leg and hold. Repeat with the other leg. Quadruped:  Place your hands and knees on the floor. Keep your wrists directly below your shoulders and your knees directly below your hips. Pull your belly button in toward your spine. Do not flatten or arch your back. Tighten your abdominal muscles below your belly button. Hold for 5 seconds. When you can do this exercise without pain for 10 to 15 seconds, you may extend one arm and hold. Repeat on the other side. Side bridge exercises:      Standing side bridge:  Stand next to a wall and extend one arm toward the wall. Place your palm flat on the wall with your fingers pointing upward. Begin with abdominal bracing. Next, without moving your feet, slowly bend your arm to 90 degrees. Hold for 5 seconds. Repeat on the other side. When you can do this exercise without pain for 10 to 15 seconds, you may do the bent leg side bridge on the floor.          Bent leg side bridge:  Lie on one side with your legs, hips, and shoulders in a straight line. Prop yourself up onto your forearm so your elbow is directly below your shoulder. Bend your knees back to 90 degrees. Begin with abdominal bracing. Next, lift your hips and balance yourself on your forearm and knees. Hold for 5 seconds. Repeat on the other side. When you can do this exercise without pain for 10 to 15 seconds, you may do the straight leg side bridge on the floor. Straight leg side bridge:  Lie on one side with your legs, hips, and shoulders in a straight line. Prop yourself up onto your forearm so your elbow is directly below your shoulder. Begin with abdominal bracing. Lift your hips off the floor and balance yourself on your forearm and the outside of your flexed foot. Do not let your ankle bend sideways. Hold for 5 seconds. Repeat on the other side. When you can do this exercise without pain for 10 to 15 seconds, ask your healthcare provider for more advanced exercises. Superman:  Lie on your stomach. Extend your arms forward on the floor. Tighten your abdominal muscles and lift your right hand and left leg off the floor. Hold this position. Slowly return to the starting position. Tighten your abdominal muscles and lift your left hand and right leg off the floor. Hold this position. Slowly return to the starting position. Clam:  Lie on your side with your knees bent. Put your bottom arm under your head to keep your neck in line. Put your top hand on your hip to keep your pelvis from moving. Put your heels together, and keep them together during this exercise. Slowly raise your top knee toward the ceiling. Then lower your leg so your knees are together. Repeat this exercise 10 times. Then switch sides and do the exercise 10 times with the other leg. Curl up:  Lie on your back with your knees bent and feet flat on the floor. Place your hands, palms down, underneath your lower back.  Next, with your elbows on the floor, lift your shoulders and chest 2 to 3 inches off the floor. Keep your head in line with your shoulders. Hold this position. Slowly return to the starting position. Straight leg raises:  Lie on your back with one leg straight. Bend the other knee and place your foot flat on the floor. Tighten your abdominal muscles. Keep your leg straight and slowly lift it straight up 6 to 12 inches off the floor. Hold this position. Lower your leg slowly. Do as many repetitions as directed on this side. Repeat with the other leg. Plank:  Lie on your stomach. Bend your elbows and place your forearms flat on the floor. Lift your chest, stomach, and knees off the floor. Make sure your elbows are below your shoulders. Your body should be in a straight line. Do not let your hips or lower back sink to the ground. Squeeze your abdominal muscles together and hold for 15 seconds. To make this exercise harder, hold for 30 seconds or lift 1 leg at a time. Bicycles:  Lie on your back. Bend both knees and bring them toward your chest. Your calves should be parallel to the floor. Place the palms of your hands on the back of your head. Straighten your right leg and keep it lifted 2 inches off the floor. Raise your head and shoulders off the floor and twist towards your left. Keep your head and shoulders lifted. Bend your right knee while you straighten your left leg. Keep your left leg 2 inches off the floor. Twist your head and chest towards the left leg. Continue to straighten 1 leg at a time and twist.       When should I call my doctor or physical therapist?   You have sharp or worsening pain during exercise or at rest.    You have questions or concerns about your condition, care, or exercise program.    CARE AGREEMENT:   You have the right to help plan your care. Learn about your health condition and how it may be treated. Discuss treatment options with your healthcare providers to decide what care you want to receive.  You always have the right to refuse treatment. The above information is an  only. It is not intended as medical advice for individual conditions or treatments. Talk to your doctor, nurse or pharmacist before following any medical regimen to see if it is safe and effective for you. © Copyright [facility] 2023 Information is for End User's use only and may not be sold, redistributed or otherwise used for commercial purposes.

## 2023-11-10 ENCOUNTER — TELEPHONE (OUTPATIENT)
Dept: OBGYN CLINIC | Facility: CLINIC | Age: 58
End: 2023-11-10

## 2023-11-10 LAB
6MAM UR QL CFM: NEGATIVE NG/ML
7AMINOCLONAZEPAM UR QL CFM: NEGATIVE NG/ML
A-OH ALPRAZ UR QL CFM: NEGATIVE NG/ML
ACCEPTABLE CREAT UR QL: ABNORMAL MG/DL
ACCEPTIBLE SP GR UR QL: NORMAL
AMITRIP UR QL CFM: NEGATIVE NG/ML
AMPHET UR QL CFM: NEGATIVE NG/ML
AMPHET UR QL CFM: NEGATIVE NG/ML
BUPRENORPHINE UR QL CFM: NEGATIVE NG/ML
BZE UR QL CFM: NEGATIVE NG/ML
DESIPRAMINE UR QL CFM: NEGATIVE NG/ML
EDDP UR QL CFM: NEGATIVE NG/ML
ETHYL GLUCURONIDE UR QL CFM: ABNORMAL NG/ML
ETHYL SULFATE UR QL SCN: NEGATIVE NG/ML
FENTANYL UR QL CFM: NEGATIVE NG/ML
FLUOXETINE UR QL CFM: NEGATIVE NG/ML
GLIADIN IGG SER IA-ACNC: NEGATIVE NG/ML
GLUCOSE 30M P 50 G LAC PO SERPL-MCNC: NEGATIVE NG/ML
HYDROCODONE UR QL CFM: NEGATIVE NG/ML
HYDROMORPHONE UR QL CFM: NEGATIVE NG/ML
LORAZEPAM UR QL CFM: NEGATIVE NG/ML
MDMA UR QL CFM: NEGATIVE NG/ML
ME-PHENIDATE UR QL CFM: NEGATIVE NG/ML
MEPERIDINE UR QL CFM: NEGATIVE NG/ML
METHADONE UR QL CFM: NEGATIVE NG/ML
METHAMPHET UR QL CFM: NEGATIVE NG/ML
MORPHINE UR QL CFM: NEGATIVE NG/ML
NITRITE UR QL: NORMAL UG/ML
NORBUPRENORPHINE UR QL CFM: NEGATIVE NG/ML
NORDIAZEPAM UR QL CFM: NEGATIVE NG/ML
NORFENTANYL UR QL CFM: NEGATIVE NG/ML
NORFLUOXETINE UR QL CFM: NEGATIVE NG/ML
NORHYDROCODONE UR QL CFM: NEGATIVE NG/ML
NORMEPERIDINE UR QL CFM: NEGATIVE NG/ML
NOROXYCODONE UR QL CFM: ABNORMAL NG/ML
NORTRIP UR QL CFM: NEGATIVE NG/ML
OLANZAPINE QUANTIFICATION: NEGATIVE NG/ML
OPC-3373 QUANTIFICATION: NEGATIVE
OXAZEPAM UR QL CFM: NEGATIVE NG/ML
OXYCODONE UR QL CFM: ABNORMAL NG/ML
OXYMORPHONE UR QL CFM: ABNORMAL NG/ML
OXYMORPHONE UR QL CFM: ABNORMAL NG/ML
PARA-FLUOROFENTANYL QUANTIFICATION: NORMAL NG/ML
PROLACTIN SERPL-MCNC: NEGATIVE NG/ML
RESULT ALL_PRESCRIBED MEDS AND SPECIAL INSTRUCTIONS: NORMAL
SECOBARBITAL UR QL CFM: NEGATIVE NG/ML
SL AMB 4-ANPP QUANTIFICATION: NORMAL NG/ML
SL AMB 7-OH-MITRAGYNINE (KRATOM ALKALOID) QUANTIFICATION: NEGATIVE NG/ML
SL AMB ACETYL FENTANYL QUANTIFICATION: NORMAL NG/ML
SL AMB ACETYL NORFENTANYL QUANTIFICATION: NORMAL NG/ML
SL AMB ACRYL FENTANYL QUANTIFICATION: NORMAL NG/ML
SL AMB CARFENTANIL QUANTIFICATION: NORMAL NG/ML
SL AMB CITALOPRAM/ESCITALOPRAM QUANTIFICATION: NEGATIVE NG/ML
SL AMB CITALOPRAM/ESCITALOPRAM QUANTIFICATION: NEGATIVE NG/ML
SL AMB CLOZAPINE QUANTIFICATION: NEGATIVE NG/ML
SL AMB CTHC (MARIJUANA METABOLITE) QUANTIFICATION: NEGATIVE NG/ML
SL AMB DEXTRORPHAN (DEXTROMETHORPHAN METABOLITE) QUANT: NEGATIVE NG/ML
SL AMB HALOPERIDOL  QUANTIFICATION: NEGATIVE NG/ML
SL AMB HALOPERIDOL METABOLITE QUANTIFICATION: NEGATIVE NG/ML
SL AMB HYDROXYBUPROPION QUANTIFICATION: NEGATIVE NG/ML
SL AMB HYDROXYRISPERIDONE QUANTIFICATION: NEGATIVE NG/ML
SL AMB N-DESMETHYL-TRAMADOL QUANTIFICATION: NEGATIVE NG/ML
SL AMB N-DESMETHYLCLOZAPINE QUANTIFICATION: NEGATIVE NG/ML
SL AMB NORQUETIAPINE QUANTIFICATION: NEGATIVE NG/ML
SL AMB PHENTERMINE QUANTIFICATION: NEGATIVE NG/ML
SL AMB PREGABALIN QUANTIFICATION: NORMAL
SL AMB QUETIAPINE QUANTIFICATION: NEGATIVE NG/ML
SL AMB RISPERIDONE QUANTIFICATION: NEGATIVE NG/ML
SL AMB RITALINIC ACID QUANTIFICATION: NEGATIVE NG/ML
SPECIMEN PH ACCEPTABLE UR: NORMAL
TAPENTADOL UR QL CFM: NEGATIVE NG/ML
TEMAZEPAM UR QL CFM: NEGATIVE NG/ML
TEMAZEPAM UR QL CFM: NEGATIVE NG/ML
TRAMADOL UR QL CFM: NEGATIVE NG/ML
URATE/CREAT 24H UR: NEGATIVE NG/ML

## 2023-11-10 NOTE — TELEPHONE ENCOUNTER
Shanta Delcid walked into office to see if paperwork was filled out. Once it it sent out and signed he would like a call that it was sent out.

## 2023-11-13 ENCOUNTER — TELEPHONE (OUTPATIENT)
Age: 58
End: 2023-11-13

## 2023-11-13 NOTE — TELEPHONE ENCOUNTER
Caller: Melita Ghotra    Doctor: Radha    Reason for call: Pt is calling because he states we received a letter from the department of transportation that needed to be filled out by Dr. Aroldo Williamson and wanted to know if that form was filled out yet.      Please advise     Call back#: [phone]

## 2023-12-10 ENCOUNTER — TELEPHONE (OUTPATIENT)
Dept: OTHER | Facility: OTHER | Age: 58
End: 2023-12-10

## 2023-12-10 NOTE — TELEPHONE ENCOUNTER
Patient is calling regarding cancelling an appointment.     Date/Time: 12/11/23 11:00AM     Patient was rescheduled: YES [] NO [x]    Patient requesting call back to reschedule: YES [x] NO []

## 2023-12-13 ENCOUNTER — OFFICE VISIT (OUTPATIENT)
Dept: FAMILY MEDICINE CLINIC | Facility: CLINIC | Age: 58
End: 2023-12-13
Payer: COMMERCIAL

## 2023-12-13 VITALS
WEIGHT: 138 LBS | SYSTOLIC BLOOD PRESSURE: 122 MMHG | HEART RATE: 83 BPM | DIASTOLIC BLOOD PRESSURE: 76 MMHG | BODY MASS INDEX: 23.56 KG/M2 | HEIGHT: 64 IN | TEMPERATURE: 96.8 F | OXYGEN SATURATION: 94 %

## 2023-12-13 DIAGNOSIS — F51.04 PSYCHOPHYSIOLOGICAL INSOMNIA: ICD-10-CM

## 2023-12-13 DIAGNOSIS — G40.909 SEIZURE DISORDER (HCC): ICD-10-CM

## 2023-12-13 DIAGNOSIS — J44.1 CHRONIC OBSTRUCTIVE PULMONARY DISEASE WITH ACUTE EXACERBATION (HCC): Primary | ICD-10-CM

## 2023-12-13 DIAGNOSIS — K21.00 GASTROESOPHAGEAL REFLUX DISEASE WITH ESOPHAGITIS WITHOUT HEMORRHAGE: ICD-10-CM

## 2023-12-13 DIAGNOSIS — E53.8 FOLIC ACID DEFICIENCY: ICD-10-CM

## 2023-12-13 PROCEDURE — T1015 CLINIC SERVICE: HCPCS | Performed by: NURSE PRACTITIONER

## 2023-12-13 RX ORDER — LEVETIRACETAM 750 MG/1
750 TABLET ORAL EVERY 12 HOURS
Qty: 180 TABLET | Refills: 2 | Status: SHIPPED | OUTPATIENT
Start: 2023-12-13

## 2023-12-13 RX ORDER — UMECLIDINIUM BROMIDE AND VILANTEROL TRIFENATATE 62.5; 25 UG/1; UG/1
1 POWDER RESPIRATORY (INHALATION) DAILY
Qty: 180 BLISTER | Refills: 2 | Status: SHIPPED | OUTPATIENT
Start: 2023-12-13

## 2023-12-13 RX ORDER — OMEPRAZOLE 40 MG/1
40 CAPSULE, DELAYED RELEASE ORAL 2 TIMES DAILY
Qty: 180 CAPSULE | Refills: 1 | Status: SHIPPED | OUTPATIENT
Start: 2023-12-13

## 2023-12-13 RX ORDER — FOLIC ACID 1 MG/1
1000 TABLET ORAL DAILY
Qty: 90 TABLET | Refills: 1 | Status: SHIPPED | OUTPATIENT
Start: 2023-12-13

## 2023-12-13 RX ORDER — DOXEPIN HYDROCHLORIDE 50 MG/1
50 CAPSULE ORAL
Qty: 90 CAPSULE | Refills: 2 | Status: SHIPPED | OUTPATIENT
Start: 2023-12-13

## 2023-12-13 NOTE — PROGRESS NOTES
Assessment/Plan:     Diagnoses and all orders for this visit:    Chronic obstructive pulmonary disease with acute exacerbation ([address])  Comments:  Symptoms controlled with Anoro daily  Refills provided today  Albuterol PRN  S/S exacerbation reviewed  Orders:  -     umeclidinium-vilanterol (Anoro Ellipta) 62.5-25 mcg/actuation inhaler; Inhale 1 puff daily    Psychophysiological insomnia  Comments:  Controlled with doxepin 50 mg hs  Refill provided today  Orders:  -     doxepin (SINEquan) 50 mg capsule; Take 1 capsule (50 mg total) by mouth daily at bedtime    Seizures (HCC)  Comments:  Controlled on curent therapy; reduced to 750 mg per keppra levels  Keppra reordered and lab level to recheck  Orders:  -     levETIRAcetam (KEPPRA) 750 mg tablet; Take 1 tablet (750 mg total) by mouth every 12 (twelve) hours  -     Levetiracetam level; Future    Gastroesophageal reflux disease with esophagitis without hemorrhage  -     omeprazole (PriLOSEC) 40 MG capsule; Take 1 capsule (40 mg total) by mouth 2 (two) times a day    Folic acid deficiency  -     folic acid (FOLVITE) 1 mg tablet; Take 1 tablet (1,000 mcg total) by mouth daily            Return in about 3 months (around 3/13/2024) for Recheck COPD; insomnia. Subjective:        Patient ID: Sherri Willett is a 63 y.o. male. Chief Complaint   Patient presents with    Follow-up     COPD        PMH alcoholic hepatitis without ascites, fonseca's esophagus, pancreatitis, insomnia, COPD, HTN, seizures, allergic rhinitis, aortic ectasia, and depression presents for COPD follow up. Started on Anoro at last OV due to frequency of albuterol inhaler use. Patient endorses improvement in breathing with Anoro inhaler daily. He has also been able to cut down on his albuterol use dramatically. Denies any wheezing today.         The following portions of the patient's history were reviewed and updated as appropriate: allergies, current medications, past family history, past medical history, past social history, past surgical history and problem list.    Patient Active Problem List   Diagnosis    Tobacco abuse    Essential hypertension    H/O suicide attempt    H/O cervical spine surgery    Chronic hyponatremia    Dietary folate deficiency anemia    Ventral hernia without obstruction or gangrene    Hepatomegaly    Hypomagnesemia    Elevated alkaline phosphatase level    Alcoholic hepatitis without ascites    Vitamin D deficiency    Iron deficiency    Urinary retention    Bladder wall thickening    Hypophosphatemia    Generalized weakness    Malnutrition of moderate degree (HCC)    Hyponatremia    Hypokalemia    Continuous chronic alcoholism (HCC)    Incisional hernia    Hx of seizure disorder    Seizure-like activity (HCC)    Diarrhea    Abnormality of pancreatic duct    Allergic rhinitis    Microcytic anemia    Abdominal wound dehiscence    Cervical disc disorder with myelopathy    Cervical spinal stenosis    Depression    Left foot drop    Lumbar radiculopathy    Neuropathy involving both lower extremities    Peripheral neuropathy    T6 vertebral fracture (HCC)    Alcoholic cirrhosis of liver without ascites (HCC)    Thrombocytopenia (HCC)    Closed fracture of left olecranon process, initial encounter    Iron deficiency anemia due to chronic blood loss    Duodenal ulcer    Tubular adenoma    Traore esophagus    Celiac artery stenosis (HCC)    Pancreatic mass    Pancytopenia (HCC)    Tobacco use    Constipation    Transaminitis    Lesion of spleen    Left anterior fascicular block    Abnormal EKG    Acute on chronic pancreatitis (HCC)    Pancreatic pseudocyst    COPD (chronic obstructive pulmonary disease) ([address])    Ileus (HCC)    Ambulatory dysfunction    Current every day smoker    Fall    Hx of duodenal ulcer    Neck pain    Alcohol use    Thoracic compression fracture (HCC)    Aortic ectasia (HCC)    Rib fractures    Seizure disorder ([address])    Hypoxia    Traore's esophagus    Elevated d-dimer    COVID-19 virus infection    Hypocalcemia    Clavicular fracture    Low serum cortisol level    Chronic anemia    Osteoporosis with current pathological fracture    Abnormal CXR    Moderate protein-calorie malnutrition (HCC)    Esophageal abnormality    Insomnia    History of alcohol abuse    Hypoglycemia    Hypotension    Closed compression fracture of third lumbar vertebra (HCC)    Sacral fracture, closed (HCC)    Coagulopathy (HCC)    Closed fracture of transverse process of lumbar vertebra (HCC)    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)    Gastroesophageal reflux disease with esophagitis without hemorrhage    Primary osteoarthritis of knee       Current Outpatient Medications   Medication Sig Dispense Refill    albuterol (PROVENTIL HFA,VENTOLIN HFA) 90 mcg/act inhaler Inhale 2 puffs every 6 (six) hours as needed for wheezing 9 g 0    calcium carbonate (OS-KORI) 600 MG tablet Take 1 tablet (600 mg total) by mouth daily 90 tablet 3    Cholecalciferol 25 MCG (1000 UT) tablet Take 1 tablet (1,000 Units total) by mouth daily 90 tablet 2    cyanocobalamin (VITAMIN B-12) 100 mcg tablet Take 1 tablet (100 mcg total) by mouth daily 90 tablet 2    Diclofenac Sodium (VOLTAREN) 1 % Apply 2 g topically 4 (four) times a day 350 g 2    doxepin (SINEquan) 50 mg capsule Take 1 capsule (50 mg total) by mouth daily at bedtime 90 capsule 2    folic acid (FOLVITE) 1 mg tablet Take 1 tablet (1,000 mcg total) by mouth daily 90 tablet 1    levETIRAcetam (KEPPRA) 750 mg tablet Take 1 tablet (750 mg total) by mouth every 12 (twelve) hours 180 tablet 2    lisinopril (ZESTRIL) 2.5 mg tablet Take 1 tablet (2.5 mg total) by mouth daily 90 tablet 2    Multiple Vitamin (TAB-A-BONI PO) Take 1 tablet by mouth daily      omeprazole (PriLOSEC) 40 MG capsule Take 1 capsule (40 mg total) by mouth 2 (two) times a day 180 capsule 1    sodium chloride 1 g tablet Take 2 tablets (2 g total) by mouth 3 (three) times a day with meals 240 tablet 3    thiamine (VITAMIN B1) 100 mg tablet Take 1 tablet (100 mg total) by mouth daily 90 tablet 1    thiamine 100 MG tablet Take 100 mg by mouth daily      umeclidinium-vilanterol (Anoro Ellipta) 62.5-25 mcg/actuation inhaler Inhale 1 puff daily 180 blister 2    pregabalin (LYRICA) 100 mg capsule Take 1 capsule (100 mg total) by mouth 3 (three) times a day 90 capsule 0     No current facility-administered medications for this visit. Past Medical History:   Diagnosis Date    Alcohol abuse     Traore esophagus     Bowel obstruction (HCC)     Bowel perforation (HCC)     Cardiac disease     Continuous chronic alcoholism ([address]) 10/5/2017    COPD (chronic obstructive pulmonary disease) (HCC)     History of shoulder surgery     Right shoulder    History of transfusion     Hx of cervical spine surgery     Hypertension     Incisional hernia 10/8/2017    [facility], old     Mitral regurgitation     Psychiatric disorder     Seizures ([address])         Past Surgical History:   Procedure Laterality Date    APPENDECTOMY      BACK SURGERY      CHOLECYSTECTOMY      ESOPHAGOGASTRODUODENOSCOPY N/A 11/28/2016    Procedure: ESOPHAGOGASTRODUODENOSCOPY (EGD); Surgeon: Virgie Newman MD;  Location: [facility];   Service:     GALLBLADDER SURGERY      LAPAROTOMY N/A 10/25/2016    Procedure: Nicole Claudio;  Surgeon: Phoenix Rosa MD;  Location: [facility];  Service:     MOUTH SURGERY      PERCUTANEOUS PINNING FEMORAL NECK FRACTURE      SHOULDER SURGERY Right     SHOULDER SURGERY      SMALL INTESTINE SURGERY      STOMACH SURGERY      bal surgery        Social History     Socioeconomic History    Marital status: Single     Spouse name: Not on file    Number of children: Not on file    Years of education: Not on file    Highest education level: Not on file   Occupational History    Not on file   Tobacco Use    Smoking status: Every Day     Current packs/day: 1.00     Average packs/day: 1 pack/day for 35.0 years (35.0 ttl pk-yrs)     Types: Cigarettes    Smokeless tobacco: Never   Vaping Use    Vaping status: Never Used   Substance and Sexual Activity    Alcohol use: Not Currently     Alcohol/week: 42.0 standard drinks of alcohol     Types: 42 Cans of beer per week     Comment: no drinks for 16 months    Drug use: No    Sexual activity: Not Currently     Partners: Female   Other Topics Concern    Not on file   Social History Narrative    Not on file     Social Determinants of Health     Financial Resource Strain: Not on file   Food Insecurity: No Food Insecurity (3/2/2023)    Received from [address] in the Last Year: Never true     [address] in the Last Year: Never true   Transportation Needs: No Transportation Needs (2/27/2023)    PRAPARE - Transportation     Lack of Transportation (Medical): No     Lack of Transportation (Non-Medical): No   Physical Activity: Not on file   Stress: Not on file   Social Connections: Not on file   Intimate Partner Violence: Not on file   Housing Stability: Low Risk  (2/27/2023)    Housing Stability Vital Sign     Unable to Pay for Housing in the Last Year: No     Number of Places Lived in the Last Year: 1     Unstable Housing in the Last Year: No      .  Review of Systems   Constitutional:  Negative for activity change, appetite change and fatigue. Respiratory:  Positive for cough. Negative for shortness of breath and wheezing. Cardiovascular:  Negative for chest pain. Gastrointestinal:  Negative for abdominal pain, constipation and diarrhea. Genitourinary:  Negative for difficulty urinating. Musculoskeletal:  Positive for back pain (chronic; follows with pain mgmt). Neurological:  Negative for dizziness and headaches. Psychiatric/Behavioral:  Negative for confusion and sleep disturbance.           Objective:      /76 (BP Location: Right arm, Patient Position: Sitting, Cuff Size: Standard)   Pulse 83   Temp (!) 96.8 °F (36 °C) (Tympanic)   Ht 5' 4" (1.626 m)   Wt 62.6 kg (138 lb)   SpO2 94%   BMI 23.69 kg/m²          Physical Exam  Vitals and nursing note reviewed. HENT:      Nose: Nose normal.      Mouth/Throat:      Mouth: Mucous membranes are moist.   Eyes:      Conjunctiva/sclera: Conjunctivae normal.   Cardiovascular:      Rate and Rhythm: Normal rate and regular rhythm. Pulmonary:      Effort: Pulmonary effort is normal.      Breath sounds: Decreased air movement present. Abdominal:      General: Abdomen is flat. Bowel sounds are normal.      Palpations: Abdomen is soft. Genitourinary:     Comments: Exam deferred  Skin:     General: Skin is warm and dry. Capillary Refill: Capillary refill takes less than 2 seconds. Neurological:      General: No focal deficit present. Mental Status: He is alert and oriented to person, place, and time.    Psychiatric:         Mood and Affect: Mood normal.         Behavior: Behavior normal.

## 2023-12-29 ENCOUNTER — APPOINTMENT (OUTPATIENT)
Dept: LAB | Facility: MEDICAL CENTER | Age: 58
End: 2023-12-29
Payer: COMMERCIAL

## 2023-12-29 DIAGNOSIS — G40.909 SEIZURE DISORDER (HCC): ICD-10-CM

## 2023-12-29 PROCEDURE — 80177 DRUG SCRN QUAN LEVETIRACETAM: CPT

## 2023-12-29 PROCEDURE — 36415 COLL VENOUS BLD VENIPUNCTURE: CPT

## 2024-01-01 LAB — LEVETIRACETAM SERPL-MCNC: 25.1 UG/ML (ref 10–40)

## 2024-01-02 ENCOUNTER — TELEPHONE (OUTPATIENT)
Dept: FAMILY MEDICINE CLINIC | Facility: CLINIC | Age: 59
End: 2024-01-02

## 2024-01-02 NOTE — TELEPHONE ENCOUNTER
----- Message from LORA He sent at 1/2/2024  8:13 AM EST -----  Level is stable; continue current dose. Thanks!

## 2024-01-02 NOTE — TELEPHONE ENCOUNTER
Patient made aware. He is asking for a refill on his vit d 50,000 weekly. He states he takes this and the 1000 daily. I do not see the 50,000 active on his list.

## 2024-01-07 ENCOUNTER — TELEPHONE (OUTPATIENT)
Dept: OTHER | Facility: OTHER | Age: 59
End: 2024-01-07

## 2024-01-07 NOTE — TELEPHONE ENCOUNTER
Patient is calling regarding cancelling an appointment.    Date/Time: 1/8/24 0800    Patient was rescheduled: YES [] NO [x]    Patient requesting call back to reschedule: YES [x] NO []

## 2024-01-08 ENCOUNTER — TELEPHONE (OUTPATIENT)
Age: 59
End: 2024-01-08

## 2024-01-08 DIAGNOSIS — M54.12 RADICULOPATHY, CERVICAL REGION: ICD-10-CM

## 2024-01-08 DIAGNOSIS — M51.36 LUMBAR DEGENERATIVE DISC DISEASE: ICD-10-CM

## 2024-01-08 DIAGNOSIS — M54.12 CERVICAL RADICULOPATHY: ICD-10-CM

## 2024-01-08 DIAGNOSIS — M48.061 SPINAL STENOSIS OF LUMBAR REGION, UNSPECIFIED WHETHER NEUROGENIC CLAUDICATION PRESENT: ICD-10-CM

## 2024-01-08 DIAGNOSIS — M47.816 LUMBAR SPONDYLOSIS: ICD-10-CM

## 2024-01-08 RX ORDER — PREGABALIN 100 MG/1
100 CAPSULE ORAL 3 TIMES DAILY
Qty: 90 CAPSULE | Refills: 0 | Status: SHIPPED | OUTPATIENT
Start: 2024-01-08 | End: 2024-02-07

## 2024-01-08 NOTE — TELEPHONE ENCOUNTER
Caller: geena    Doctor: milagros    Reason for call: pt would like to know if [facility]    Call back#: [phone]

## 2024-01-09 LAB

## 2024-01-24 DIAGNOSIS — J44.1 CHRONIC OBSTRUCTIVE PULMONARY DISEASE WITH ACUTE EXACERBATION (HCC): ICD-10-CM

## 2024-01-24 RX ORDER — ALBUTEROL SULFATE 90 UG/1
2 AEROSOL, METERED RESPIRATORY (INHALATION) EVERY 6 HOURS PRN
Qty: 9 G | Refills: 0 | Status: SHIPPED | OUTPATIENT
Start: 2024-01-24

## 2024-01-29 ENCOUNTER — OFFICE VISIT (OUTPATIENT)
Dept: PAIN MEDICINE | Facility: CLINIC | Age: 59
End: 2024-01-29
Payer: COMMERCIAL

## 2024-01-29 VITALS
RESPIRATION RATE: 16 BRPM | HEIGHT: 64 IN | HEART RATE: 85 BPM | WEIGHT: 139 LBS | SYSTOLIC BLOOD PRESSURE: 165 MMHG | DIASTOLIC BLOOD PRESSURE: 98 MMHG | BODY MASS INDEX: 23.73 KG/M2

## 2024-01-29 DIAGNOSIS — M54.12 CERVICAL RADICULOPATHY: ICD-10-CM

## 2024-01-29 DIAGNOSIS — M51.36 LUMBAR DEGENERATIVE DISC DISEASE: ICD-10-CM

## 2024-01-29 DIAGNOSIS — M48.061 SPINAL STENOSIS OF LUMBAR REGION, UNSPECIFIED WHETHER NEUROGENIC CLAUDICATION PRESENT: ICD-10-CM

## 2024-01-29 DIAGNOSIS — M54.9 MID BACK PAIN: Primary | ICD-10-CM

## 2024-01-29 DIAGNOSIS — M54.12 RADICULOPATHY, CERVICAL REGION: ICD-10-CM

## 2024-01-29 DIAGNOSIS — M47.816 LUMBAR SPONDYLOSIS: ICD-10-CM

## 2024-01-29 PROCEDURE — 99214 OFFICE O/P EST MOD 30 MIN: CPT | Performed by: ANESTHESIOLOGY

## 2024-01-29 RX ORDER — PREGABALIN 100 MG/1
100 CAPSULE ORAL 3 TIMES DAILY
Qty: 90 CAPSULE | Refills: 2 | Status: SHIPPED | OUTPATIENT
Start: 2024-01-29 | End: 2024-02-28

## 2024-01-29 NOTE — H&P (VIEW-ONLY)
Assessment:  1. Lumbar spondylosis    2. Cervical radiculopathy    3. Radiculopathy, cervical region    4. Lumbar degenerative disc disease    5. Spinal stenosis of lumbar region, unspecified whether neurogenic claudication present        Plan:  Patient is a 58-year-old male complains of arm pain, low back pain, leg pain with chronic pain syndrome secondary to cervical radiculopathy status post ACDF, lumbar radiculopathy presents to office for follow-up visit.  Patient reports he no longer needs opioid pain regimen.  At this time we will dissolve opioid contract we will continue with help with neuropathic pain.  1.  We will discontinue oxycodone 5 mg p.o. 4 times daily  2.  We will continue Lyrica 100 mg p.o. 3 times daily  3.  We will schedule patient for a bilateral L4-5 and L5-S1 medial branch block #1  4.  We will provide patient physical therapy for cervical, thoracic and lumbar core strengthening exercises  5.  We will follow-up in 2 months         History of Present Illness:  The patient is a 58 y.o. male who presents for a follow up office visit in regards to Back Pain and Neck Pain.   The patient’s current symptoms include 7/10 sharp pain in the neck and low back which is worse at nighttime.    Current pain medications includes: Lyrica 100 mg.  The patient reports that this regimen is providing 20% pain relief.  The patient is reporting no side effects from this pain medication regimen.    I have personally reviewed and/or updated the patient's past medical history, past surgical history, family history, social history, current medications, allergies, and vital signs today.         Review of Systems  Review of Systems   Musculoskeletal:  Positive for back pain, myalgias and neck pain.        Decreased ROM  Joint stiffness   All other systems reviewed and are negative.          Past Medical History:   Diagnosis Date    Alcohol abuse     Traore esophagus     Bowel obstruction (HCC)     Bowel perforation  (HCC)     Cardiac disease     Continuous chronic alcoholism (HCC) 10/5/2017    COPD (chronic obstructive pulmonary disease) (HCC)     History of shoulder surgery     Right shoulder    History of transfusion     Hx of cervical spine surgery     Hypertension     Incisional hernia 10/8/2017    MI, old     Mitral regurgitation     Psychiatric disorder     Seizures (HCC)        Past Surgical History:   Procedure Laterality Date    APPENDECTOMY      BACK SURGERY      CHOLECYSTECTOMY      ESOPHAGOGASTRODUODENOSCOPY N/A 11/28/2016    Procedure: ESOPHAGOGASTRODUODENOSCOPY (EGD);  Surgeon: Darryl Monroy MD;  Location: [facility];  Service:     GALLBLADDER SURGERY      LAPAROTOMY N/A 10/25/2016    Procedure: LAPAROTOMY EXPLORATORY;  Surgeon: Renzo Harper MD;  Location: [facility];  Service:     MOUTH SURGERY      PERCUTANEOUS PINNING FEMORAL NECK FRACTURE      SHOULDER SURGERY Right     SHOULDER SURGERY      SMALL INTESTINE SURGERY      STOMACH SURGERY      bal surgery       Family History   Problem Relation Age of Onset    Breast cancer Mother     Prostate cancer Father     Skin cancer Brother        Social History     Occupational History    Not on file   Tobacco Use    Smoking status: Every Day     Current packs/day: 1.00     Average packs/day: 1 pack/day for 35.0 years (35.0 ttl pk-yrs)     Types: Cigarettes    Smokeless tobacco: Never   Vaping Use    Vaping status: Never Used   Substance and Sexual Activity    Alcohol use: Not Currently     Alcohol/week: 42.0 standard drinks of alcohol     Types: 42 Cans of beer per week     Comment: no drinks for 16 months    Drug use: No    Sexual activity: Not Currently     Partners: Female         Current Outpatient Medications:     albuterol (PROVENTIL HFA,VENTOLIN HFA) 90 mcg/act inhaler, Inhale 2 puffs every 6 (six) hours as needed for wheezing, Disp: 9 g, Rfl: 0    calcium carbonate (OS-KORI) 600 MG tablet, Take 1 tablet (600 mg total) by mouth daily, Disp: 90 tablet, Rfl:  "3    Cholecalciferol 25 MCG (1000 UT) tablet, Take 1 tablet (1,000 Units total) by mouth daily, Disp: 90 tablet, Rfl: 2    cyanocobalamin (VITAMIN B-12) 100 mcg tablet, Take 1 tablet (100 mcg total) by mouth daily, Disp: 90 tablet, Rfl: 2    Diclofenac Sodium (VOLTAREN) 1 %, Apply 2 g topically 4 (four) times a day, Disp: 350 g, Rfl: 2    doxepin (SINEquan) 50 mg capsule, Take 1 capsule (50 mg total) by mouth daily at bedtime, Disp: 90 capsule, Rfl: 2    folic acid (FOLVITE) 1 mg tablet, Take 1 tablet (1,000 mcg total) by mouth daily, Disp: 90 tablet, Rfl: 1    levETIRAcetam (KEPPRA) 750 mg tablet, Take 1 tablet (750 mg total) by mouth every 12 (twelve) hours, Disp: 180 tablet, Rfl: 2    lisinopril (ZESTRIL) 2.5 mg tablet, Take 1 tablet (2.5 mg total) by mouth daily, Disp: 90 tablet, Rfl: 2    Multiple Vitamin (TAB-A-BONI PO), Take 1 tablet by mouth daily, Disp: , Rfl:     omeprazole (PriLOSEC) 40 MG capsule, Take 1 capsule (40 mg total) by mouth 2 (two) times a day, Disp: 180 capsule, Rfl: 1    pregabalin (LYRICA) 100 mg capsule, Take 1 capsule (100 mg total) by mouth 3 (three) times a day, Disp: 90 capsule, Rfl: 0    sodium chloride 1 g tablet, Take 2 tablets (2 g total) by mouth 3 (three) times a day with meals, Disp: 240 tablet, Rfl: 3    thiamine (VITAMIN B1) 100 mg tablet, Take 1 tablet (100 mg total) by mouth daily, Disp: 90 tablet, Rfl: 1    thiamine 100 MG tablet, Take 100 mg by mouth daily, Disp: , Rfl:     umeclidinium-vilanterol (Anoro Ellipta) 62.5-25 mcg/actuation inhaler, Inhale 1 puff daily, Disp: 180 blister, Rfl: 2    No Known Allergies    Physical Exam:    /98   Pulse 85   Resp 16   Ht 5' 4\" (1.626 m)   Wt 63 kg (139 lb)   BMI 23.86 kg/m²     Constitutional:normal, well developed, well nourished, alert, in no distress and non-toxic and no overt pain behavior.  Eyes:anicteric  HEENT:grossly intact  Neck:supple, symmetric, trachea midline and no masses   Pulmonary:even and " unlabored  Cardiovascular:No edema or pitting edema present  Skin:Normal without rashes or lesions and well hydrated  Psychiatric:Mood and affect appropriate  Neurologic:Cranial Nerves II-XII grossly intact  Musculoskeletal:normal      Imaging      Study Result    Narrative & Impression   MRI CERVICAL SPINE WITHOUT CONTRAST     INDICATION: M54.12: Radiculopathy, cervical region  Z98.1: Arthrodesis status.     COMPARISON: 8/12/2014 and CT from 5/20/2022     TECHNIQUE:  Multiplanar, multisequence imaging of the cervical spine was performed. .        IMAGE QUALITY:  Diagnostic     FINDINGS:     ALIGNMENT: The patient is status post anterior cervical discectomy and spinal fusion from C5-C7. Patient is also status post posterior spinal fusion from C4-T1. There is trace anterolisthesis at C7-T1.     MARROW SIGNAL:  Normal marrow signal is identified within the visualized bony structures.  No discrete marrow lesion.     CERVICAL AND VISUALIZED THORACIC CORD:  Normal signal within the visualized cord.     PREVERTEBRAL AND PARASPINAL SOFT TISSUES:  Normal.     VISUALIZED POSTERIOR FOSSA:  The visualized posterior fossa demonstrates no abnormal signal.     CERVICAL DISC SPACES:     C2-C3: There is a disc osteophyte complex with uncovertebral spurring. There is hypertrophy of the posterior arch. There is moderate canal stenosis with contact of the cord. There is mild to moderate foraminal narrowing bilaterally.     C3-C4: There is a disc osteophyte complex with uncovertebral spurring. There is hypertrophy of the posterior arch. There is mild to moderate canal stenosis. There is mild to moderate foraminal narrowing.     C4-C5: Canal is decompressed by laminectomy. No significant foraminal narrowing.     C5-C6: Canal is decompressed by laminectomy. No significant foraminal narrowing.     C6-C7: Canal is decompressed by laminectomy. No significant foraminal narrowing.     C7-T1: No significant canal stenosis or foraminal  narrowing. Canal is decompressed by laminectomy.     UPPER THORACIC DISC SPACES:  Normal.     OTHER FINDINGS:  None.     IMPRESSION:     Postoperative changes and multilevel cervical spondylosis, as described above.     Interval development of cervical spondylosis at C2-C3 and C3-C4 since MRI from 8/12/2014 as described above. No intrinsic cord signal abnormality seen.        Narrative & Impression   LUMBAR SPINE     INDICATION:   R52: Pain, unspecified.     COMPARISON: 5/9/2023     VIEWS:  XR SPINE LUMBAR 2 OR 3 VIEWS INJURY        FINDINGS:     There are 5 non rib bearing lumbar vertebral bodies.     Mild L3 compression deformity.     Alignment is unremarkable without dynamic instability.     Age-appropriate lumbar degenerative changes are seen.     The pedicles appear intact.     There are atherosclerotic calcifications. Soft tissues are otherwise unremarkable.     IMPRESSION:  Mild L3 compression deformity. Correlate with clinical exam. Degenerative changes as described.     The study was marked in EPIC for significant notification.     Workstation performed: [id number]     No orders to display       No orders of the defined types were placed in this encounter.

## 2024-02-12 DIAGNOSIS — E87.1 CHRONIC HYPONATREMIA: ICD-10-CM

## 2024-02-12 RX ORDER — LISINOPRIL 2.5 MG/1
2.5 TABLET ORAL DAILY
Qty: 90 TABLET | Refills: 2 | Status: SHIPPED | OUTPATIENT
Start: 2024-02-12

## 2024-02-15 ENCOUNTER — APPOINTMENT (OUTPATIENT)
Dept: RADIOLOGY | Facility: HOSPITAL | Age: 59
End: 2024-02-15
Payer: COMMERCIAL

## 2024-02-15 ENCOUNTER — HOSPITAL ENCOUNTER (OUTPATIENT)
Facility: HOSPITAL | Age: 59
Setting detail: OUTPATIENT SURGERY
Discharge: HOME | End: 2024-02-15
Attending: ANESTHESIOLOGY | Admitting: ANESTHESIOLOGY
Payer: COMMERCIAL

## 2024-02-15 VITALS
SYSTOLIC BLOOD PRESSURE: 133 MMHG | DIASTOLIC BLOOD PRESSURE: 84 MMHG | BODY MASS INDEX: 23.73 KG/M2 | WEIGHT: 139 LBS | HEIGHT: 64 IN | HEART RATE: 72 BPM | RESPIRATION RATE: 18 BRPM | TEMPERATURE: 95.9 F | OXYGEN SATURATION: 94 %

## 2024-02-15 DIAGNOSIS — M54.16 LUMBAR RADICULOPATHY: ICD-10-CM

## 2024-02-15 DIAGNOSIS — S32.009S CLOSED FRACTURE OF TRANSVERSE PROCESS OF LUMBAR VERTEBRA, SEQUELA: ICD-10-CM

## 2024-02-15 DIAGNOSIS — T84.216A HARDWARE FAILURE OF ANTERIOR COLUMN OF SPINE (HCC): ICD-10-CM

## 2024-02-15 DIAGNOSIS — S32.10XS CLOSED FRACTURE OF SACRUM, UNSPECIFIED PORTION OF SACRUM, SEQUELA: ICD-10-CM

## 2024-02-15 DIAGNOSIS — M21.372 LEFT FOOT DROP: Primary | ICD-10-CM

## 2024-02-15 DIAGNOSIS — S32.030S CLOSED COMPRESSION FRACTURE OF L3 VERTEBRA, SEQUELA: ICD-10-CM

## 2024-02-15 DIAGNOSIS — R26.2 AMBULATORY DYSFUNCTION: ICD-10-CM

## 2024-02-15 PROCEDURE — 64494 INJ PARAVERT F JNT L/S 2 LEV: CPT | Performed by: ANESTHESIOLOGY

## 2024-02-15 PROCEDURE — 64493 INJ PARAVERT F JNT L/S 1 LEV: CPT | Performed by: ANESTHESIOLOGY

## 2024-02-15 RX ORDER — LIDOCAINE HYDROCHLORIDE 10 MG/ML
INJECTION, SOLUTION EPIDURAL; INFILTRATION; INTRACAUDAL; PERINEURAL AS NEEDED
Status: DISCONTINUED | OUTPATIENT
Start: 2024-02-15 | End: 2024-02-15 | Stop reason: HOSPADM

## 2024-02-15 RX ORDER — LIDOCAINE HYDROCHLORIDE 20 MG/ML
INJECTION, SOLUTION EPIDURAL; INFILTRATION; INTRACAUDAL; PERINEURAL AS NEEDED
Status: DISCONTINUED | OUTPATIENT
Start: 2024-02-15 | End: 2024-02-15 | Stop reason: HOSPADM

## 2024-02-15 NOTE — INTERVAL H&P NOTE
H&P reviewed. After examining the patient I find no changes in the patients condition since the H&P had been written.    Vitals:    02/15/24 0746   BP: 139/80   Pulse: 84   Resp: 18   Temp: (!) 95.4 °F (35.2 °C)   SpO2: 97%

## 2024-02-15 NOTE — DISCHARGE INSTR - AVS FIRST PAGE

## 2024-02-15 NOTE — OP NOTE
OPERATIVE REPORT  PATIENT NAME: Cristofer Wiley    :  1965  MRN: [id number]  Pt Location: [facility]    SURGERY DATE: 2/15/2024    Surgeons and Role:     * Jonathan Garcia MD - Primary    Preop Diagnosis:  Lumbar spondylosis [M47.816]    Post-Op Diagnosis Codes:     * Lumbar spondylosis [M47.816]    Procedure(s):  Bilateral - Bilateral L4-5 and L5-S1 medial branch block #1    Specimen(s):  * No specimens in log *    Estimated Blood Loss:   Minimal    Drains:  * No LDAs found *    Anesthesia Type:   Local    Operative Indications:  Lumbar spondylosis [M47.816]      Operative Findings:  same    Complications:   None    Procedure and Technique:  Fluoroscopically-guided Medial Branch Nerve/Dorsal Ramus Blocks of the bilateral L4-L5,-L5-S1 facet joint(s) using 2% lidocaine      After discussing the risks, benefits, and alternatives to the procedure, the patient expressed understanding and wished to proceed.  The patient was brought to the fluoroscopy suite and placed in the prone position.  Procedural pause conducted to verify:  correct patient identity, procedure to be performed and as applicable, correct side and site, correct patient position, and availability of implants, special equipment and special requirements.  Using fluoroscopy, the junction of the transverse process and superior articulating process of the right L3, L4, L5 levels were identified and marked.  The skin was sterilely prepped and draped in the usual fashion using Chloraprep skin prep.  Using fluoroscopic guidance, a 3.5 inch 22 gauge spinal needle was advanced to each target.  After negative aspiration 0.5cc of 0.2% lidocaine was injected at each site and the needles were then removed. The procedure was repeated in the exact same way on the opposite side achieving same results.   The patient tolerated the procedure well and there were no apparent complications.  After appropriate observation, the patient was dismissed from the clinic  in good condition under their own power.  The patient was instructed to keep a pain diary and report the results at her follow up appointment.    I was present for the entire procedure.    Patient Disposition:  hemodynamically stable        SIGNATURE: Jonathan Garcia MD  DATE: February 15, 2024  TIME: 8:36 AM

## 2024-02-22 ENCOUNTER — PREP FOR PROCEDURE (OUTPATIENT)
Age: 59
End: 2024-02-22

## 2024-02-22 DIAGNOSIS — M80.00XS OSTEOPOROSIS WITH CURRENT PATHOLOGICAL FRACTURE, UNSPECIFIED OSTEOPOROSIS TYPE, SEQUELA: Primary | ICD-10-CM

## 2024-02-22 DIAGNOSIS — M47.816 LUMBAR SPONDYLOSIS: Primary | ICD-10-CM

## 2024-03-07 ENCOUNTER — APPOINTMENT (OUTPATIENT)
Dept: RADIOLOGY | Facility: HOSPITAL | Age: 59
End: 2024-03-07
Payer: COMMERCIAL

## 2024-03-07 ENCOUNTER — HOSPITAL ENCOUNTER (OUTPATIENT)
Facility: HOSPITAL | Age: 59
Setting detail: OUTPATIENT SURGERY
Discharge: HOME | End: 2024-03-07
Attending: ANESTHESIOLOGY | Admitting: ANESTHESIOLOGY
Payer: COMMERCIAL

## 2024-03-07 VITALS
SYSTOLIC BLOOD PRESSURE: 131 MMHG | HEIGHT: 65 IN | OXYGEN SATURATION: 99 % | DIASTOLIC BLOOD PRESSURE: 85 MMHG | WEIGHT: 140 LBS | BODY MASS INDEX: 23.32 KG/M2 | TEMPERATURE: 97.2 F | RESPIRATION RATE: 18 BRPM | HEART RATE: 63 BPM

## 2024-03-07 DIAGNOSIS — M54.16 LUMBAR RADICULOPATHY: ICD-10-CM

## 2024-03-07 DIAGNOSIS — S32.19XS OTHER CLOSED FRACTURE OF SACRUM, SEQUELA: ICD-10-CM

## 2024-03-07 DIAGNOSIS — R26.2 AMBULATORY DYSFUNCTION: ICD-10-CM

## 2024-03-07 DIAGNOSIS — T84.216A HARDWARE FAILURE OF ANTERIOR COLUMN OF SPINE (HCC): ICD-10-CM

## 2024-03-07 DIAGNOSIS — S32.009S CLOSED FRACTURE OF TRANSVERSE PROCESS OF LUMBAR VERTEBRA, SEQUELA: Primary | ICD-10-CM

## 2024-03-07 DIAGNOSIS — M80.80XS OTHER OSTEOPOROSIS WITH CURRENT PATHOLOGICAL FRACTURE, SEQUELA: ICD-10-CM

## 2024-03-07 DIAGNOSIS — S32.030S CLOSED COMPRESSION FRACTURE OF L3 VERTEBRA, SEQUELA: ICD-10-CM

## 2024-03-07 PROCEDURE — 64493 INJ PARAVERT F JNT L/S 1 LEV: CPT | Performed by: ANESTHESIOLOGY

## 2024-03-07 PROCEDURE — 64494 INJ PARAVERT F JNT L/S 2 LEV: CPT | Performed by: ANESTHESIOLOGY

## 2024-03-07 RX ORDER — BUPIVACAINE HYDROCHLORIDE 5 MG/ML
INJECTION, SOLUTION EPIDURAL; INTRACAUDAL AS NEEDED
Status: DISCONTINUED | OUTPATIENT
Start: 2024-03-07 | End: 2024-03-07 | Stop reason: HOSPADM

## 2024-03-07 RX ORDER — LIDOCAINE HYDROCHLORIDE 10 MG/ML
INJECTION, SOLUTION EPIDURAL; INFILTRATION; INTRACAUDAL; PERINEURAL AS NEEDED
Status: DISCONTINUED | OUTPATIENT
Start: 2024-03-07 | End: 2024-03-07 | Stop reason: HOSPADM

## 2024-03-07 NOTE — H&P
Assessment:  Lumbar spondylosis    Plan:  Cristofer Wiley is a 58 y.o. male with complaints of low back pain presents to surgical center for procedure.  We will perform a Procedure(s) (LRB):  BLOCK MEDIAL BRANCH B/L L4-5 and L5-S1 MBB#2 (Bilateral)   2. Follow-up 1 month after injection    Complete risks and benefits including bleeding, infection, tissue reaction, nerve injury and allergic reaction were discussed. The approach was demonstrated using models and literature was provided. Verbal and written consent was obtained.    My impressions and treatment recommendations were discussed in detail with the patient who verbalized understanding and had no further questions.  Discharge instructions were provided. I personally saw and examined the patient and I agree with the above discussed plan of care.    No orders of the defined types were placed in this encounter.    No orders of the defined types were placed in this encounter.      History of Present Illness:  Cristofer Wiley is a 58 y.o. male who presents for a follow up office visit in regards to low back pain.   The patient’s current symptoms include 8 out of 10 sharp constant burning pain with any particular time pattern.      I have personally reviewed and/or updated the patient's past medical history, past surgical history, family history, social history, current medications, allergies, and vital signs today.     Review of Systems   Musculoskeletal:  Positive for arthralgias and back pain.   All other systems reviewed and are negative.      Patient Active Problem List   Diagnosis    Tobacco abuse    Essential hypertension    H/O suicide attempt    H/O cervical spine surgery    Chronic hyponatremia    Dietary folate deficiency anemia    Ventral hernia without obstruction or gangrene    Hepatomegaly    Hypomagnesemia    Elevated alkaline phosphatase level    Alcoholic hepatitis without ascites    Vitamin D deficiency    Iron deficiency    Urinary retention    Bladder  wall thickening    Hypophosphatemia    Generalized weakness    Malnutrition of moderate degree (HCC)    Hyponatremia    Hypokalemia    Continuous chronic alcoholism (HCC)    Incisional hernia    Hx of seizure disorder    Seizure-like activity (HCC)    Diarrhea    Abnormality of pancreatic duct    Allergic rhinitis    Microcytic anemia    Abdominal wound dehiscence    Cervical disc disorder with myelopathy    Cervical spinal stenosis    Depression    Left foot drop    Lumbar radiculopathy    Neuropathy involving both lower extremities    Peripheral neuropathy    T6 vertebral fracture (HCC)    Alcoholic cirrhosis of liver without ascites (HCC)    Thrombocytopenia (HCC)    Closed fracture of left olecranon process, initial encounter    Iron deficiency anemia due to chronic blood loss    Duodenal ulcer    Tubular adenoma    Traore esophagus    Celiac artery stenosis (HCC)    Pancreatic mass    Pancytopenia (HCC)    Tobacco use    Constipation    Transaminitis    Lesion of spleen    Left anterior fascicular block    Abnormal EKG    Acute on chronic pancreatitis (HCC)    Pancreatic pseudocyst    COPD (chronic obstructive pulmonary disease) (HCC)    Ileus (HCC)    Ambulatory dysfunction    Current every day smoker    Fall    Hx of duodenal ulcer    Neck pain    Alcohol use    Thoracic compression fracture (HCC)    Aortic ectasia (HCC)    Rib fractures    Seizure disorder (HCC)    Hypoxia    Traore's esophagus    Elevated d-dimer    COVID-19 virus infection    Hypocalcemia    Clavicular fracture    Low serum cortisol level    Chronic anemia    Osteoporosis with current pathological fracture    Abnormal CXR    Moderate protein-calorie malnutrition (HCC)    Esophageal abnormality    Insomnia    History of alcohol abuse    Hypoglycemia    Hypotension    Closed compression fracture of third lumbar vertebra (HCC)    Sacral fracture, closed (HCC)    Coagulopathy (HCC)    Closed fracture of transverse process of lumbar vertebra  (HCC)    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)    Gastroesophageal reflux disease with esophagitis without hemorrhage    Primary osteoarthritis of knee       Past Medical History:   Diagnosis Date    Alcohol abuse     Traore esophagus     Bowel obstruction (HCC)     Bowel perforation (HCC)     Cardiac disease     Continuous chronic alcoholism (HCC) 10/5/2017    COPD (chronic obstructive pulmonary disease) (HCC)     History of shoulder surgery     Right shoulder    History of transfusion     Hx of cervical spine surgery     Hypertension     Incisional hernia 10/8/2017    MI, old     Mitral regurgitation     Psychiatric disorder     Seizures (HCC)        Past Surgical History:   Procedure Laterality Date    APPENDECTOMY      BACK SURGERY      CHOLECYSTECTOMY      ESOPHAGOGASTRODUODENOSCOPY N/A 11/28/2016    Procedure: ESOPHAGOGASTRODUODENOSCOPY (EGD);  Surgeon: Darryl Monroy MD;  Location:  [facility];  Service:     GALLBLADDER SURGERY      LAPAROTOMY N/A 10/25/2016    Procedure: LAPAROTOMY EXPLORATORY;  Surgeon: Renzo Harper MD;  Location: [facility];  Service:     MOUTH SURGERY      NERVE BLOCK Bilateral 2/15/2024    Procedure: Bilateral L4-5 and L5-S1 medial branch block #1;  Surgeon: Jonathan Garcia MD;  Location: [facility];  Service: Pain Management     PERCUTANEOUS PINNING FEMORAL NECK FRACTURE      SHOULDER SURGERY Right     SHOULDER SURGERY      SMALL INTESTINE SURGERY      STOMACH SURGERY      bal surgery       Family History   Problem Relation Age of Onset    Breast cancer Mother     Prostate cancer Father     Skin cancer Brother        Social History     Occupational History    Not on file   Tobacco Use    Smoking status: Every Day     Current packs/day: 1.00     Average packs/day: 1 pack/day for 35.0 years (35.0 ttl pk-yrs)     Types: Cigarettes    Smokeless tobacco: Never   Vaping Use    Vaping status: Never Used   Substance and Sexual Activity    Alcohol use: Not Currently      "Alcohol/week: 42.0 standard drinks of alcohol     Types: 42 Cans of beer per week     Comment: no alcoholic drinks for 23 months - now non-alcoholic drinks    Drug use: No    Sexual activity: Not Currently     Partners: Female       No current facility-administered medications on file prior to encounter.     Current Outpatient Medications on File Prior to Encounter   Medication Sig    albuterol (PROVENTIL HFA,VENTOLIN HFA) 90 mcg/act inhaler Inhale 2 puffs every 6 (six) hours as needed for wheezing    calcium carbonate (OS-KORI) 600 MG tablet Take 1 tablet (600 mg total) by mouth daily    Cholecalciferol 25 MCG (1000 UT) tablet Take 1 tablet (1,000 Units total) by mouth daily    cyanocobalamin (VITAMIN B-12) 100 mcg tablet Take 1 tablet (100 mcg total) by mouth daily    Diclofenac Sodium (VOLTAREN) 1 % Apply 2 g topically 4 (four) times a day    doxepin (SINEquan) 50 mg capsule Take 1 capsule (50 mg total) by mouth daily at bedtime    folic acid (FOLVITE) 1 mg tablet Take 1 tablet (1,000 mcg total) by mouth daily    levETIRAcetam (KEPPRA) 750 mg tablet Take 1 tablet (750 mg total) by mouth every 12 (twelve) hours    lisinopril (ZESTRIL) 2.5 mg tablet Take 1 tablet (2.5 mg total) by mouth daily    Multiple Vitamin (TAB-A-BONI PO) Take 1 tablet by mouth daily    omeprazole (PriLOSEC) 40 MG capsule Take 1 capsule (40 mg total) by mouth 2 (two) times a day    pregabalin (LYRICA) 100 mg capsule Take 1 capsule (100 mg total) by mouth 3 (three) times a day    sodium chloride 1 g tablet Take 2 tablets (2 g total) by mouth 3 (three) times a day with meals    thiamine (VITAMIN B1) 100 mg tablet Take 1 tablet (100 mg total) by mouth daily    umeclidinium-vilanterol (Anoro Ellipta) 62.5-25 mcg/actuation inhaler Inhale 1 puff daily       No Known Allergies    Physical Exam:    /74   Pulse 75   Temp (!) 97.2 °F (36.2 °C) (Tympanic)   Resp 18   Ht 5' 5\" (1.651 m)   Wt 63.5 kg (140 lb)   SpO2 99%   BMI 23.30 kg/m² "     Constitutional:normal, well developed, well nourished, alert, in no distress and non-toxic and no overt pain behavior.  Eyes:anicteric  HEENT:grossly intact  Neck:supple, symmetric, trachea midline and no masses   Pulmonary:even and unlabored  Cardiovascular:No edema or pitting edema present  Skin:Normal without rashes or lesions and well hydrated  Psychiatric:Mood and affect appropriate  Neurologic:Cranial Nerves II-XII grossly intact  Musculoskeletal:normal

## 2024-03-07 NOTE — OP NOTE
OPERATIVE REPORT  PATIENT NAME: Cristofer Wiley    :  1965  MRN: [id number]  Pt Location: [facility]    SURGERY DATE: 3/7/2024    Surgeons and Role:     * Jonathan Garcia MD - Primary    Preop Diagnosis:  Lumbar spondylosis [M47.816]    Post-Op Diagnosis Codes:     * Lumbar spondylosis [M47.816]    Procedure(s):  Bilateral - BLOCK MEDIAL BRANCH B/L L4-5 and L5-S1 MBB#2    Specimen(s):  * No specimens in log *    Estimated Blood Loss:   Minimal    Drains:  * No LDAs found *    Anesthesia Type:   Local    Operative Indications:  Lumbar spondylosis [M47.816]      Operative Findings:  same    Complications:   None    Procedure and Technique:  Fluoroscopically-guided Medial Branch Nerve/Dorsal Ramus Blocks of the bilateral L4-L5,-L5-S1 facet joint(s) using 0.5% bupivacaine    After discussing the risks, benefits, and alternatives to the procedure, the patient expressed understanding and wished to proceed.  The patient was brought to the fluoroscopy suite and placed in the prone position.  Procedural pause conducted to verify:  correct patient identity, procedure to be performed and as applicable, correct side and site, correct patient position, and availability of implants, special equipment and special requirements.  Using fluoroscopy, the junction of the transverse process and superior articulating process of the right L3, L4, L5 levels were identified and marked.  The skin was sterilely prepped and draped in the usual fashion using Chloraprep skin prep.  Using fluoroscopic guidance, a 3.5 inch 22 gauge spinal needle was advanced to each target.  After negative aspiration 0.5cc of 0.5% bupivacaine was injected at each site and the needles were then removed. The procedure was repeated in the exact same way on the opposite side achieving same results.   The patient tolerated the procedure well and there were no apparent complications.  After appropriate observation, the patient was dismissed from the clinic  in good condition under their own power.  The patient was instructed to keep a pain diary and report the results at her follow up appointment.    I was present for the entire procedure.    Patient Disposition:  hemodynamically stable        SIGNATURE: Jonathan Garcia MD  DATE: March 7, 2024  TIME: 12:59 PM

## 2024-03-07 NOTE — DISCHARGE INSTR - AVS FIRST PAGE

## 2024-03-11 ENCOUNTER — TELEPHONE (OUTPATIENT)
Dept: PAIN MEDICINE | Facility: CLINIC | Age: 59
End: 2024-03-11

## 2024-03-11 NOTE — TELEPHONE ENCOUNTER
Called pt and left message to call us back and schedule follow up per BK & RM to discuss pain diary.

## 2024-03-12 DIAGNOSIS — E87.1 HYPONATREMIA: ICD-10-CM

## 2024-03-12 DIAGNOSIS — M80.00XS OSTEOPOROSIS WITH CURRENT PATHOLOGICAL FRACTURE, UNSPECIFIED OSTEOPOROSIS TYPE, SEQUELA: Primary | ICD-10-CM

## 2024-03-13 ENCOUNTER — OFFICE VISIT (OUTPATIENT)
Dept: PAIN MEDICINE | Facility: CLINIC | Age: 59
End: 2024-03-13
Payer: COMMERCIAL

## 2024-03-13 ENCOUNTER — TELEPHONE (OUTPATIENT)
Dept: NEPHROLOGY | Facility: CLINIC | Age: 59
End: 2024-03-13

## 2024-03-13 ENCOUNTER — APPOINTMENT (OUTPATIENT)
Dept: LAB | Facility: MEDICAL CENTER | Age: 59
End: 2024-03-13
Payer: COMMERCIAL

## 2024-03-13 VITALS
WEIGHT: 140.8 LBS | HEIGHT: 65 IN | BODY MASS INDEX: 23.46 KG/M2 | SYSTOLIC BLOOD PRESSURE: 149 MMHG | HEART RATE: 84 BPM | RESPIRATION RATE: 16 BRPM | DIASTOLIC BLOOD PRESSURE: 90 MMHG

## 2024-03-13 DIAGNOSIS — E87.1 HYPONATREMIA: Primary | ICD-10-CM

## 2024-03-13 DIAGNOSIS — M47.816 LUMBAR SPONDYLOSIS: Primary | ICD-10-CM

## 2024-03-13 DIAGNOSIS — G89.4 CHRONIC PAIN SYNDROME: ICD-10-CM

## 2024-03-13 DIAGNOSIS — E87.1 HYPONATREMIA: ICD-10-CM

## 2024-03-13 LAB
ANION GAP SERPL CALCULATED.3IONS-SCNC: 9 MMOL/L (ref 4–13)
BUN SERPL-MCNC: 9 MG/DL (ref 5–25)
CALCIUM SERPL-MCNC: 9.4 MG/DL (ref 8.4–10.2)
CHLORIDE SERPL-SCNC: 92 MMOL/L (ref 96–108)
CO2 SERPL-SCNC: 25 MMOL/L (ref 21–32)
CREAT SERPL-MCNC: 0.7 MG/DL (ref 0.6–1.3)
GFR SERPL CREATININE-BSD FRML MDRD: 104 ML/MIN/1.73SQ M
GLUCOSE P FAST SERPL-MCNC: 78 MG/DL (ref 65–99)
POTASSIUM SERPL-SCNC: 4.9 MMOL/L (ref 3.5–5.3)
SODIUM SERPL-SCNC: 126 MMOL/L (ref 135–147)

## 2024-03-13 PROCEDURE — 99214 OFFICE O/P EST MOD 30 MIN: CPT | Performed by: ANESTHESIOLOGY

## 2024-03-13 PROCEDURE — 36415 COLL VENOUS BLD VENIPUNCTURE: CPT

## 2024-03-13 PROCEDURE — 80048 BASIC METABOLIC PNL TOTAL CA: CPT

## 2024-03-13 RX ORDER — SODIUM CHLORIDE 1 G/1
TABLET ORAL
Qty: 240 TABLET | Refills: 0 | Status: SHIPPED | OUTPATIENT
Start: 2024-03-13

## 2024-03-13 NOTE — PATIENT INSTRUCTIONS
Cervical, Thoracic and Lumbar Medial Branch (Facet) Radiofrequency Neurotomy (Rhizotomy)      What is a medial branch neurotomy and why is it helpful?  A medial branch neurotomy is a non-surgical procedure which cauterizes the nerves (through very localized heating) that allow you to feel pain caused by your facet joints. Your facet has been proven to be painful by diagnostic injection procedures, but your pain has not been reduced by other treatment methods. Likely, we have previously numbed the medial branch nerves to see if you were a candidate for the neurotomy. The pain relief you obtained from numbing the facet nerve supply (medial branch nerves) should persist for several months after locally heating  (cauterizing) these nerves.    In some patients, the pain never returns. The neurotomy (if technically successful) theoretically prevents the pain signal from traveling through these nerves (from your joints to your brain) so you cannot feel or sense your injured and/or diseased spinal joints. These medial branch nerves do not control any muscles or sensation in your arms or legs. They only allow you to feel these joints and small nearby ligaments and control a short, small muscle (multifidus muscle) in your neck, mid-back or low back. Studies have shown that there is no functional or clinical significance from ablating the multifidi muscles.    What happens during the procedure?  Lying on your stomach, the skin over your neck, mid-back or low back will be well cleaned. The physician will numb a small area of skin with numbing medicine which may sting for a few seconds. The physician will use X-ray guidance to direct a special (radiofrequency) needle along side the targeted medial branch nerve. A small amount of electrical current will be carefully given through the tip of the radiofrequency needle to assure the needle is precisely next to the target medial branch nerve and not any other larger nerves. This may,  for a few seconds, recreate your pain and cause a muscle twitch in your neck or back. The medial branch nerves will then be numbed so you will feel little to nothing while the nerve is being heated (locally destroyed) for one minute. This process will be repeated for usually one to five additional nerves. The entire procedure takes about 30 minutes.    What should I do after the procedure?  You will wait 30 to 60 minutes in recovery before going home. You may not drive for eight hours. You will want to substantially limit your activity for two days after the procedure.    What should I expect after the procedure?  Your neck or back may often be moderately sore during the next one to four days. This pain is usually caused by muscle spasms and irritability while the medial branch nerves are disrupted from the heat lesion over the next 7 to 14 days. Your physician will give you medicine to treat the expected spasms and soreness. Pain relief usually isn’t experienced until about two to three weeks after the procedure when, on occasion, your back or neck may feel slightly weak for several weeks after the procedure.  The nerves will eventually grow back (regenerate) but the pain may not recur in 9 to 14 months, or on occasion, sooner. If the pain does recur when the nerves grow back and the nerve signal is re-established, you may have the procedure repeated with equal success seen in most patients. Some patients never have a return of their pain, but we cannot predict when this happens.    General Pre/Post Instructions  Eating  You may eat a light, but not full meal at least six hours before the procedure. If you are an insulin dependent diabetic do not alter your normal food intake.   Medications  Take your routine medications before the procedure (such as high blood pressure and diabetes medications) except for those that need to be discontinued five days before the procedure such as aspirin and all anti-inflammatory  medications (e.g. Motrin/Ibuprofen, Aleve, Relafen, Daypro). These medicines may be re-started the day after the procedure. You may take your regular pain medicine as needed before/after the procedure. If you are taking Coumadin, Heparin, Lovenox, Plavix or Ticlid you must notify the office so that the timing of stopping these medications can be explained.   Exercise  You must bring a  with you. You may return to your current level of activities the next day including return to work.     Things that may Delay the Procedure  If you are on antibiotics please notify our office; we may delay the procedure. If you have an active infection or fever we will not do the procedure.

## 2024-03-13 NOTE — TELEPHONE ENCOUNTER
I called and left a VM instructing Cristofer to get the BMP at his earliest convenience. I also asked that he calls the office back to schedule a follow up appt.

## 2024-03-13 NOTE — H&P (VIEW-ONLY)
Here for BP Check per  DRISS Haddad NP    BP Readin/67    UT:  [facility] [place]         Assessment:  1. Lumbar spondylosis    2. Chronic pain syndrome        Plan:  Patient is a 58-year-old male complains of neck pain and low back pain chronic pain since secondary lumbar spondylosis, cervical spondylosis presents to office for follow-up visit.  Patient status post bilateral L4-5 and L5-S1 medial branch block #2 performed on 3/7/2024 at which she reports greater than 80% relief lasting more than hours.  This time patient is interested in proceeding with radiofrequency ablation and does not meet the criteria for progression.  1.  We will schedule patient for a right L4-L5 and L5-S1 radiofrequency ablation  2.  We will schedule patient for a left L4-L5 and L5-S1 radiofrequency ablation  3.  Follow-up 1 month after injection      Complete risks and benefits including bleeding, infection, tissue reaction, nerve injury and allergic reaction were discussed. The approach was demonstrated using models and literature was provided. Verbal and written consent was obtained.      History of Present Illness:  The patient is a 58 y.o. male who presents for a follow up office visit in regards to Back Pain and Neck Pain.   The patient’s current symptoms include 7 out of 10 constant sharp, pressure-like pain worse at nighttime.    Current pain medications includes: Lyrica 100 mg p.o. 3 times daily.  The patient reports that this regimen is providing 20% pain relief.  The patient is reporting no side effects from this pain medication regimen.    I have personally reviewed and/or updated the patient's past medical history, past surgical history, family history, social history, current medications, allergies, and vital signs today.         Review of Systems  Review of Systems   Musculoskeletal:  Positive for back pain and neck pain.        Decreased ROM   All other systems reviewed and are negative.          Past Medical History:   Diagnosis Date    Alcohol abuse     Traore esophagus     Bowel obstruction (HCC)     Bowel  perforation (HCC)     Cardiac disease     Continuous chronic alcoholism (HCC) 10/5/2017    COPD (chronic obstructive pulmonary disease) (HCC)     History of shoulder surgery     Right shoulder    History of transfusion     Hx of cervical spine surgery     Hypertension     Incisional hernia 10/8/2017    MI, old     Mitral regurgitation     Psychiatric disorder     Seizures (HCC)        Past Surgical History:   Procedure Laterality Date    APPENDECTOMY      BACK SURGERY      CHOLECYSTECTOMY      ESOPHAGOGASTRODUODENOSCOPY N/A 11/28/2016    Procedure: ESOPHAGOGASTRODUODENOSCOPY (EGD);  Surgeon: Darryl Monroy MD;  Location:  GI LAB;  Service:     GALLBLADDER SURGERY      LAPAROTOMY N/A 10/25/2016    Procedure: LAPAROTOMY EXPLORATORY;  Surgeon: Renzo Harper MD;  Location: [facility];  Service:     MOUTH SURGERY      NERVE BLOCK Bilateral 2/15/2024    Procedure: Bilateral L4-5 and L5-S1 medial branch block #1;  Surgeon: Jonathan Garcia MD;  Location: [facility];  Service: Pain Management     NERVE BLOCK Bilateral 3/7/2024    Procedure: BLOCK MEDIAL BRANCH B/L L4-5 and L5-S1 MBB#2;  Surgeon: Jonathan Garcia MD;  Location: [facility];  Service: Pain Management     PERCUTANEOUS PINNING FEMORAL NECK FRACTURE      SHOULDER SURGERY Right     SHOULDER SURGERY      SMALL INTESTINE SURGERY      STOMACH SURGERY      bal surgery       Family History   Problem Relation Age of Onset    Breast cancer Mother     Prostate cancer Father     Skin cancer Brother        Social History     Occupational History    Not on file   Tobacco Use    Smoking status: Every Day     Current packs/day: 1.00     Average packs/day: 1 pack/day for 35.0 years (35.0 ttl pk-yrs)     Types: Cigarettes    Smokeless tobacco: Never   Vaping Use    Vaping status: Never Used   Substance and Sexual Activity    Alcohol use: Not Currently     Alcohol/week: 42.0 standard drinks of alcohol     Types: 42 Cans of beer per week     Comment: no alcoholic  drinks for 23 months - now non-alcoholic drinks    Drug use: No    Sexual activity: Not Currently     Partners: Female         Current Outpatient Medications:     albuterol (PROVENTIL HFA,VENTOLIN HFA) 90 mcg/act inhaler, Inhale 2 puffs every 6 (six) hours as needed for wheezing, Disp: 9 g, Rfl: 0    calcium carbonate (OS-KORI) 600 MG tablet, Take 1 tablet (600 mg total) by mouth daily, Disp: 90 tablet, Rfl: 3    Cholecalciferol 25 MCG (1000 UT) tablet, Take 1 tablet (1,000 Units total) by mouth daily, Disp: 90 tablet, Rfl: 2    cyanocobalamin (VITAMIN B-12) 100 mcg tablet, Take 1 tablet (100 mcg total) by mouth daily, Disp: 90 tablet, Rfl: 2    Diclofenac Sodium (VOLTAREN) 1 %, Apply 2 g topically 4 (four) times a day, Disp: 350 g, Rfl: 2    doxepin (SINEquan) 50 mg capsule, Take 1 capsule (50 mg total) by mouth daily at bedtime, Disp: 90 capsule, Rfl: 2    folic acid (FOLVITE) 1 mg tablet, Take 1 tablet (1,000 mcg total) by mouth daily, Disp: 90 tablet, Rfl: 1    levETIRAcetam (KEPPRA) 750 mg tablet, Take 1 tablet (750 mg total) by mouth every 12 (twelve) hours, Disp: 180 tablet, Rfl: 2    lisinopril (ZESTRIL) 2.5 mg tablet, Take 1 tablet (2.5 mg total) by mouth daily, Disp: 90 tablet, Rfl: 2    Multiple Vitamin (TAB-A-BONI PO), Take 1 tablet by mouth daily, Disp: , Rfl:     omeprazole (PriLOSEC) 40 MG capsule, Take 1 capsule (40 mg total) by mouth 2 (two) times a day, Disp: 180 capsule, Rfl: 1    pregabalin (LYRICA) 100 mg capsule, Take 1 capsule (100 mg total) by mouth 3 (three) times a day, Disp: 90 capsule, Rfl: 2    sodium chloride 1 g tablet, TAKE 2 TABLETS BY MOUTH THREE TIMES DAILY WITH MEALS, Disp: 240 tablet, Rfl: 0    thiamine (VITAMIN B1) 100 mg tablet, Take 1 tablet (100 mg total) by mouth daily, Disp: 90 tablet, Rfl: 1    umeclidinium-vilanterol (Anoro Ellipta) 62.5-25 mcg/actuation inhaler, Inhale 1 puff daily, Disp: 180 blister, Rfl: 2    No Known Allergies    Physical Exam:    /90   Pulse 84  "  Resp 16   Ht 5' 5\" (1.651 m)   Wt 63.9 kg (140 lb 12.8 oz)   BMI 23.43 kg/m²     Constitutional:normal, well developed, well nourished, alert, in no distress and non-toxic and no overt pain behavior.  Eyes:anicteric  HEENT:grossly intact  Neck:supple, symmetric, trachea midline and no masses   Pulmonary:even and unlabored  Cardiovascular:No edema or pitting edema present  Skin:Normal without rashes or lesions and well hydrated  Psychiatric:Mood and affect appropriate  Neurologic:Cranial Nerves II-XII grossly intact  Musculoskeletal:antalgic    Lumbar/Sacral Spine examination demonstrates.  Decreased range of motion lumbar spine with pain upon: flexion, lateral rotation to the left/right, and bending to the left/right.  Bilateral lumbar paraspinals tender to palpation. Muscle spasms noted in the lumbar area bilaterally. 4/5 lower extremity strength in all muscle groups bilaterally. Positive seated straight leg raise for bilateral lower extremities.  Sensitivity to light touch intact bilateral lower extremities. 2+ reflexes in the patella and Achilles.  No ankle clonus    Imaging    Study Result    Narrative & Impression   MRI CERVICAL SPINE WITHOUT CONTRAST     INDICATION: M54.12: Radiculopathy, cervical region  Z98.1: Arthrodesis status.     COMPARISON: 8/12/2014 and CT from 5/20/2022     TECHNIQUE:  Multiplanar, multisequence imaging of the cervical spine was performed. .        IMAGE QUALITY:  Diagnostic     FINDINGS:     ALIGNMENT: The patient is status post anterior cervical discectomy and spinal fusion from C5-C7. Patient is also status post posterior spinal fusion from C4-T1. There is trace anterolisthesis at C7-T1.     MARROW SIGNAL:  Normal marrow signal is identified within the visualized bony structures.  No discrete marrow lesion.     CERVICAL AND VISUALIZED THORACIC CORD:  Normal signal within the visualized cord.     PREVERTEBRAL AND PARASPINAL SOFT TISSUES:  Normal.     VISUALIZED POSTERIOR " FOSSA:  The visualized posterior fossa demonstrates no abnormal signal.     CERVICAL DISC SPACES:     C2-C3: There is a disc osteophyte complex with uncovertebral spurring. There is hypertrophy of the posterior arch. There is moderate canal stenosis with contact of the cord. There is mild to moderate foraminal narrowing bilaterally.     C3-C4: There is a disc osteophyte complex with uncovertebral spurring. There is hypertrophy of the posterior arch. There is mild to moderate canal stenosis. There is mild to moderate foraminal narrowing.     C4-C5: Canal is decompressed by laminectomy. No significant foraminal narrowing.     C5-C6: Canal is decompressed by laminectomy. No significant foraminal narrowing.     C6-C7: Canal is decompressed by laminectomy. No significant foraminal narrowing.     C7-T1: No significant canal stenosis or foraminal narrowing. Canal is decompressed by laminectomy.     UPPER THORACIC DISC SPACES:  Normal.     OTHER FINDINGS:  None.     IMPRESSION:     Postoperative changes and multilevel cervical spondylosis, as described above.     Interval development of cervical spondylosis at C2-C3 and C3-C4 since MRI from 8/12/2014 as described above. No intrinsic cord signal abnormality seen.        Workstation performed: [id number]         Study Result    Narrative & Impression   LUMBAR SPINE     INDICATION:   R52: Pain, unspecified.     COMPARISON: 5/9/2023     VIEWS:  XR SPINE LUMBAR 2 OR 3 VIEWS INJURY        FINDINGS:     There are 5 non rib bearing lumbar vertebral bodies.     Mild L3 compression deformity.     Alignment is unremarkable without dynamic instability.     Age-appropriate lumbar degenerative changes are seen.     The pedicles appear intact.     There are atherosclerotic calcifications. Soft tissues are otherwise unremarkable.     IMPRESSION:  Mild L3 compression deformity. Correlate with clinical exam. Degenerative changes as described.     The study was marked in EPIC for significant  notification.     Workstation performed: [id number]         No orders to display       No orders of the defined types were placed in this encounter.

## 2024-03-13 NOTE — PROGRESS NOTES
Assessment:  1. Lumbar spondylosis    2. Chronic pain syndrome        Plan:  Patient is a 58-year-old male complains of neck pain and low back pain chronic pain since secondary lumbar spondylosis, cervical spondylosis presents to office for follow-up visit.  Patient status post bilateral L4-5 and L5-S1 medial branch block #2 performed on 3/7/2024 at which she reports greater than 80% relief lasting more than hours.  This time patient is interested in proceeding with radiofrequency ablation and does not meet the criteria for progression.  1.  We will schedule patient for a right L4-L5 and L5-S1 radiofrequency ablation  2.  We will schedule patient for a left L4-L5 and L5-S1 radiofrequency ablation  3.  Follow-up 1 month after injection      Complete risks and benefits including bleeding, infection, tissue reaction, nerve injury and allergic reaction were discussed. The approach was demonstrated using models and literature was provided. Verbal and written consent was obtained.      History of Present Illness:  The patient is a 58 y.o. male who presents for a follow up office visit in regards to Back Pain and Neck Pain.   The patient’s current symptoms include 7 out of 10 constant sharp, pressure-like pain worse at nighttime.    Current pain medications includes: Lyrica 100 mg p.o. 3 times daily.  The patient reports that this regimen is providing 20% pain relief.  The patient is reporting no side effects from this pain medication regimen.    I have personally reviewed and/or updated the patient's past medical history, past surgical history, family history, social history, current medications, allergies, and vital signs today.         Review of Systems  Review of Systems   Musculoskeletal:  Positive for back pain and neck pain.        Decreased ROM   All other systems reviewed and are negative.          Past Medical History:   Diagnosis Date    Alcohol abuse     Traore esophagus     Bowel obstruction (HCC)     Bowel  perforation (HCC)     Cardiac disease     Continuous chronic alcoholism (HCC) 10/5/2017    COPD (chronic obstructive pulmonary disease) (HCC)     History of shoulder surgery     Right shoulder    History of transfusion     Hx of cervical spine surgery     Hypertension     Incisional hernia 10/8/2017    MI, old     Mitral regurgitation     Psychiatric disorder     Seizures (HCC)        Past Surgical History:   Procedure Laterality Date    APPENDECTOMY      BACK SURGERY      CHOLECYSTECTOMY      ESOPHAGOGASTRODUODENOSCOPY N/A 11/28/2016    Procedure: ESOPHAGOGASTRODUODENOSCOPY (EGD);  Surgeon: Darryl Monroy MD;  Location:  GI LAB;  Service:     GALLBLADDER SURGERY      LAPAROTOMY N/A 10/25/2016    Procedure: LAPAROTOMY EXPLORATORY;  Surgeon: Renzo Harper MD;  Location: [facility];  Service:     MOUTH SURGERY      NERVE BLOCK Bilateral 2/15/2024    Procedure: Bilateral L4-5 and L5-S1 medial branch block #1;  Surgeon: Jonathan Garcia MD;  Location: [facility];  Service: Pain Management     NERVE BLOCK Bilateral 3/7/2024    Procedure: BLOCK MEDIAL BRANCH B/L L4-5 and L5-S1 MBB#2;  Surgeon: Jonathan Garcia MD;  Location: [facility];  Service: Pain Management     PERCUTANEOUS PINNING FEMORAL NECK FRACTURE      SHOULDER SURGERY Right     SHOULDER SURGERY      SMALL INTESTINE SURGERY      STOMACH SURGERY      bal surgery       Family History   Problem Relation Age of Onset    Breast cancer Mother     Prostate cancer Father     Skin cancer Brother        Social History     Occupational History    Not on file   Tobacco Use    Smoking status: Every Day     Current packs/day: 1.00     Average packs/day: 1 pack/day for 35.0 years (35.0 ttl pk-yrs)     Types: Cigarettes    Smokeless tobacco: Never   Vaping Use    Vaping status: Never Used   Substance and Sexual Activity    Alcohol use: Not Currently     Alcohol/week: 42.0 standard drinks of alcohol     Types: 42 Cans of beer per week     Comment: no alcoholic  drinks for 23 months - now non-alcoholic drinks    Drug use: No    Sexual activity: Not Currently     Partners: Female         Current Outpatient Medications:     albuterol (PROVENTIL HFA,VENTOLIN HFA) 90 mcg/act inhaler, Inhale 2 puffs every 6 (six) hours as needed for wheezing, Disp: 9 g, Rfl: 0    calcium carbonate (OS-KORI) 600 MG tablet, Take 1 tablet (600 mg total) by mouth daily, Disp: 90 tablet, Rfl: 3    Cholecalciferol 25 MCG (1000 UT) tablet, Take 1 tablet (1,000 Units total) by mouth daily, Disp: 90 tablet, Rfl: 2    cyanocobalamin (VITAMIN B-12) 100 mcg tablet, Take 1 tablet (100 mcg total) by mouth daily, Disp: 90 tablet, Rfl: 2    Diclofenac Sodium (VOLTAREN) 1 %, Apply 2 g topically 4 (four) times a day, Disp: 350 g, Rfl: 2    doxepin (SINEquan) 50 mg capsule, Take 1 capsule (50 mg total) by mouth daily at bedtime, Disp: 90 capsule, Rfl: 2    folic acid (FOLVITE) 1 mg tablet, Take 1 tablet (1,000 mcg total) by mouth daily, Disp: 90 tablet, Rfl: 1    levETIRAcetam (KEPPRA) 750 mg tablet, Take 1 tablet (750 mg total) by mouth every 12 (twelve) hours, Disp: 180 tablet, Rfl: 2    lisinopril (ZESTRIL) 2.5 mg tablet, Take 1 tablet (2.5 mg total) by mouth daily, Disp: 90 tablet, Rfl: 2    Multiple Vitamin (TAB-A-BONI PO), Take 1 tablet by mouth daily, Disp: , Rfl:     omeprazole (PriLOSEC) 40 MG capsule, Take 1 capsule (40 mg total) by mouth 2 (two) times a day, Disp: 180 capsule, Rfl: 1    pregabalin (LYRICA) 100 mg capsule, Take 1 capsule (100 mg total) by mouth 3 (three) times a day, Disp: 90 capsule, Rfl: 2    sodium chloride 1 g tablet, TAKE 2 TABLETS BY MOUTH THREE TIMES DAILY WITH MEALS, Disp: 240 tablet, Rfl: 0    thiamine (VITAMIN B1) 100 mg tablet, Take 1 tablet (100 mg total) by mouth daily, Disp: 90 tablet, Rfl: 1    umeclidinium-vilanterol (Anoro Ellipta) 62.5-25 mcg/actuation inhaler, Inhale 1 puff daily, Disp: 180 blister, Rfl: 2    No Known Allergies    Physical Exam:    /90   Pulse 84  "  Resp 16   Ht 5' 5\" (1.651 m)   Wt 63.9 kg (140 lb 12.8 oz)   BMI 23.43 kg/m²     Constitutional:normal, well developed, well nourished, alert, in no distress and non-toxic and no overt pain behavior.  Eyes:anicteric  HEENT:grossly intact  Neck:supple, symmetric, trachea midline and no masses   Pulmonary:even and unlabored  Cardiovascular:No edema or pitting edema present  Skin:Normal without rashes or lesions and well hydrated  Psychiatric:Mood and affect appropriate  Neurologic:Cranial Nerves II-XII grossly intact  Musculoskeletal:antalgic    Lumbar/Sacral Spine examination demonstrates.  Decreased range of motion lumbar spine with pain upon: flexion, lateral rotation to the left/right, and bending to the left/right.  Bilateral lumbar paraspinals tender to palpation. Muscle spasms noted in the lumbar area bilaterally. 4/5 lower extremity strength in all muscle groups bilaterally. Positive seated straight leg raise for bilateral lower extremities.  Sensitivity to light touch intact bilateral lower extremities. 2+ reflexes in the patella and Achilles.  No ankle clonus    Imaging    Study Result    Narrative & Impression   MRI CERVICAL SPINE WITHOUT CONTRAST     INDICATION: M54.12: Radiculopathy, cervical region  Z98.1: Arthrodesis status.     COMPARISON: 8/12/2014 and CT from 5/20/2022     TECHNIQUE:  Multiplanar, multisequence imaging of the cervical spine was performed. .        IMAGE QUALITY:  Diagnostic     FINDINGS:     ALIGNMENT: The patient is status post anterior cervical discectomy and spinal fusion from C5-C7. Patient is also status post posterior spinal fusion from C4-T1. There is trace anterolisthesis at C7-T1.     MARROW SIGNAL:  Normal marrow signal is identified within the visualized bony structures.  No discrete marrow lesion.     CERVICAL AND VISUALIZED THORACIC CORD:  Normal signal within the visualized cord.     PREVERTEBRAL AND PARASPINAL SOFT TISSUES:  Normal.     VISUALIZED POSTERIOR " FOSSA:  The visualized posterior fossa demonstrates no abnormal signal.     CERVICAL DISC SPACES:     C2-C3: There is a disc osteophyte complex with uncovertebral spurring. There is hypertrophy of the posterior arch. There is moderate canal stenosis with contact of the cord. There is mild to moderate foraminal narrowing bilaterally.     C3-C4: There is a disc osteophyte complex with uncovertebral spurring. There is hypertrophy of the posterior arch. There is mild to moderate canal stenosis. There is mild to moderate foraminal narrowing.     C4-C5: Canal is decompressed by laminectomy. No significant foraminal narrowing.     C5-C6: Canal is decompressed by laminectomy. No significant foraminal narrowing.     C6-C7: Canal is decompressed by laminectomy. No significant foraminal narrowing.     C7-T1: No significant canal stenosis or foraminal narrowing. Canal is decompressed by laminectomy.     UPPER THORACIC DISC SPACES:  Normal.     OTHER FINDINGS:  None.     IMPRESSION:     Postoperative changes and multilevel cervical spondylosis, as described above.     Interval development of cervical spondylosis at C2-C3 and C3-C4 since MRI from 8/12/2014 as described above. No intrinsic cord signal abnormality seen.        Workstation performed: [id number]         Study Result    Narrative & Impression   LUMBAR SPINE     INDICATION:   R52: Pain, unspecified.     COMPARISON: 5/9/2023     VIEWS:  XR SPINE LUMBAR 2 OR 3 VIEWS INJURY        FINDINGS:     There are 5 non rib bearing lumbar vertebral bodies.     Mild L3 compression deformity.     Alignment is unremarkable without dynamic instability.     Age-appropriate lumbar degenerative changes are seen.     The pedicles appear intact.     There are atherosclerotic calcifications. Soft tissues are otherwise unremarkable.     IMPRESSION:  Mild L3 compression deformity. Correlate with clinical exam. Degenerative changes as described.     The study was marked in EPIC for significant  notification.     Workstation performed: [id number]         No orders to display       No orders of the defined types were placed in this encounter.

## 2024-03-14 ENCOUNTER — TELEPHONE (OUTPATIENT)
Dept: NEPHROLOGY | Facility: CLINIC | Age: 59
End: 2024-03-14

## 2024-03-14 NOTE — TELEPHONE ENCOUNTER
I spoke with Cristofer regarding the following:    ----- Message from LORA Cueva sent at 3/14/2024  8:03 AM EDT -----  Sodium is too low at 126 and normal 135-145. Please ask how much fluid he drinks per day. Please assist with scheduling office visit    He is aware of lab results. He states he is still drinking the same amount of fluid he was advised to. He states he is not doing anything different. He is scheduled to be seen 04/05/2024.

## 2024-03-14 NOTE — TELEPHONE ENCOUNTER
Patient called the RX Refill Line. Message is being forwarded to the office.     Patient called stating that he received a message from the office to call back. He thought maybe it was regarding the blood work he had done yesterday.     Please contact patient at [phone]

## 2024-03-14 NOTE — TELEPHONE ENCOUNTER
I called and left a detailed message for patient to get BMP done ASAP and to call office back to schedule a F/U appointment.

## 2024-03-20 ENCOUNTER — TELEPHONE (OUTPATIENT)
Dept: NEPHROLOGY | Facility: CLINIC | Age: 59
End: 2024-03-20

## 2024-03-20 DIAGNOSIS — I10 ESSENTIAL HYPERTENSION: Primary | ICD-10-CM

## 2024-03-20 NOTE — TELEPHONE ENCOUNTER
Caller: geena Cleveland    Doctor: Radha    Reason for call: pt stated the hospital never gave him a call to schedule his next procedure, pt would like it done as soon as possible. Please Advise    Call back#: [phone]

## 2024-03-20 NOTE — TELEPHONE ENCOUNTER
I called and spoke with patient, he is aware. Patient stated he is drinking the recommended amount of fluid you told him to. I  scheduled  patient with you in [place] on 4/2

## 2024-03-20 NOTE — TELEPHONE ENCOUNTER
I called and spoke with patient, he is aware of instructions of 8 oz and labs prior to appt. Lab order placed.

## 2024-03-23 DIAGNOSIS — K70.10 ALCOHOLIC HEPATITIS WITHOUT ASCITES: ICD-10-CM

## 2024-03-25 DIAGNOSIS — K70.10 ALCOHOLIC HEPATITIS WITHOUT ASCITES: ICD-10-CM

## 2024-03-25 RX ORDER — LANOLIN ALCOHOL/MO/W.PET/CERES
100 CREAM (GRAM) TOPICAL DAILY
Qty: 90 TABLET | Refills: 0 | OUTPATIENT
Start: 2024-03-25

## 2024-03-25 RX ORDER — THIAMINE MONONITRATE (VIT B1) 100 MG
100 TABLET ORAL DAILY
Qty: 90 TABLET | Refills: 1 | Status: SHIPPED | OUTPATIENT
Start: 2024-03-25

## 2024-03-27 NOTE — TELEPHONE ENCOUNTER
I called and spoke with Cristofer. He is aware to repeat BMP prior to his upcoming appt on 04/05/2024.

## 2024-04-01 ENCOUNTER — APPOINTMENT (OUTPATIENT)
Dept: LAB | Facility: MEDICAL CENTER | Age: 59
End: 2024-04-01
Payer: COMMERCIAL

## 2024-04-01 DIAGNOSIS — I10 ESSENTIAL HYPERTENSION: ICD-10-CM

## 2024-04-01 LAB
ANION GAP SERPL CALCULATED.3IONS-SCNC: 9 MMOL/L (ref 4–13)
BUN SERPL-MCNC: 8 MG/DL (ref 5–25)
CALCIUM SERPL-MCNC: 8.9 MG/DL (ref 8.4–10.2)
CHLORIDE SERPL-SCNC: 93 MMOL/L (ref 96–108)
CO2 SERPL-SCNC: 27 MMOL/L (ref 21–32)
CREAT SERPL-MCNC: 0.71 MG/DL (ref 0.6–1.3)
GFR SERPL CREATININE-BSD FRML MDRD: 104 ML/MIN/1.73SQ M
GLUCOSE P FAST SERPL-MCNC: 110 MG/DL (ref 65–99)
POTASSIUM SERPL-SCNC: 4.1 MMOL/L (ref 3.5–5.3)
SODIUM SERPL-SCNC: 129 MMOL/L (ref 135–147)

## 2024-04-01 PROCEDURE — 80048 BASIC METABOLIC PNL TOTAL CA: CPT

## 2024-04-01 PROCEDURE — 36415 COLL VENOUS BLD VENIPUNCTURE: CPT

## 2024-04-03 ENCOUNTER — HOSPITAL ENCOUNTER (OUTPATIENT)
Facility: HOSPITAL | Age: 59
Setting detail: OUTPATIENT SURGERY
Discharge: HOME | End: 2024-04-03
Attending: ANESTHESIOLOGY | Admitting: ANESTHESIOLOGY
Payer: COMMERCIAL

## 2024-04-03 ENCOUNTER — APPOINTMENT (OUTPATIENT)
Dept: RADIOLOGY | Facility: HOSPITAL | Age: 59
End: 2024-04-03
Payer: COMMERCIAL

## 2024-04-03 VITALS
RESPIRATION RATE: 20 BRPM | WEIGHT: 142 LBS | BODY MASS INDEX: 23.66 KG/M2 | SYSTOLIC BLOOD PRESSURE: 170 MMHG | HEART RATE: 67 BPM | DIASTOLIC BLOOD PRESSURE: 90 MMHG | TEMPERATURE: 97 F | OXYGEN SATURATION: 96 % | HEIGHT: 65 IN

## 2024-04-03 DIAGNOSIS — S32.009S CLOSED FRACTURE OF TRANSVERSE PROCESS OF LUMBAR VERTEBRA, SEQUELA: ICD-10-CM

## 2024-04-03 DIAGNOSIS — M47.816 LUMBAR SPONDYLOSIS: Primary | ICD-10-CM

## 2024-04-03 DIAGNOSIS — R26.2 AMBULATORY DYSFUNCTION: ICD-10-CM

## 2024-04-03 DIAGNOSIS — G89.4 CHRONIC PAIN SYNDROME: ICD-10-CM

## 2024-04-03 DIAGNOSIS — T84.216A HARDWARE FAILURE OF ANTERIOR COLUMN OF SPINE (HCC): ICD-10-CM

## 2024-04-03 DIAGNOSIS — S32.030S CLOSED COMPRESSION FRACTURE OF L3 VERTEBRA, SEQUELA: ICD-10-CM

## 2024-04-03 DIAGNOSIS — M54.16 LUMBAR RADICULOPATHY: ICD-10-CM

## 2024-04-03 PROCEDURE — 64636 DESTROY L/S FACET JNT ADDL: CPT | Performed by: ANESTHESIOLOGY

## 2024-04-03 PROCEDURE — 64635 DESTROY LUMB/SAC FACET JNT: CPT | Performed by: ANESTHESIOLOGY

## 2024-04-03 RX ORDER — LIDOCAINE HYDROCHLORIDE 20 MG/ML
INJECTION, SOLUTION EPIDURAL; INFILTRATION; INTRACAUDAL; PERINEURAL AS NEEDED
Status: DISCONTINUED | OUTPATIENT
Start: 2024-04-03 | End: 2024-04-03 | Stop reason: HOSPADM

## 2024-04-03 RX ORDER — LIDOCAINE HYDROCHLORIDE 10 MG/ML
INJECTION, SOLUTION EPIDURAL; INFILTRATION; INTRACAUDAL; PERINEURAL AS NEEDED
Status: DISCONTINUED | OUTPATIENT
Start: 2024-04-03 | End: 2024-04-03 | Stop reason: HOSPADM

## 2024-04-03 NOTE — DISCHARGE INSTR - AVS FIRST PAGE

## 2024-04-03 NOTE — INTERVAL H&P NOTE
H&P reviewed. After examining the patient I find no changes in the patients condition since the H&P had been written.    Vitals:    04/03/24 1124   BP: 163/87   Pulse: 72   Resp: 20   Temp: (!) 96.4 °F (35.8 °C)   SpO2: 97%

## 2024-04-03 NOTE — H&P (VIEW-ONLY)
Assessment:   Lumbar Spondylosis    Plan:  Cristofer Wiley is a 58 y.o. male with complaints of low back pain presents to surgical center for procedure.  We will perform a Procedure(s) (LRB):  Left L4-L5 and L5-S1 radiofrequency ablation (Left)   2. Follow-up 1 month after injection    Complete risks and benefits including bleeding, infection, tissue reaction, nerve injury and allergic reaction were discussed. The approach was demonstrated using models and literature was provided. Verbal and written consent was obtained.    My impressions and treatment recommendations were discussed in detail with the patient who verbalized understanding and had no further questions.  Discharge instructions were provided. I personally saw and examined the patient and I agree with the above discussed plan of care.    Orders Placed This Encounter   Procedures    FL spine and pain procedure     Standing Status:   Standing     Number of Occurrences:   1     Order Specific Question:   Reason for Exam:     Answer:   LEFT L4-L5; L5-S1 RADIO FREQUENCY ABLATION     Order Specific Question:   Anticoagulant hold needed?     Answer:   NA    Discharge Diet     Order Specific Question:   Diet Type:     Answer:   Regular    Activity as tolerated    Remove dressing in 24 hours    Call provider for:  persistent nausea or vomiting    Call provider for:  severe uncontrolled pain    Call provider for:  redness, tenderness, or signs of infection (pain, swelling, redness, odor or green/yellow discharge around incision site)    Call provider for: active or persistent bleeding    Call provider for:  difficulty breathing, headache or visual disturbances    Call provider for:  hives    Call provider for:  persistent dizziness or light-headedness    Call provider for:  extreme fatigue    Discharge Nursing Instructions: Provide Tobacco Cessation Materials to Patient     If patient willing to receive, nurse to provide tobacco cessation materials from Admission  Packet.     Standing Status:   Standing     Number of Occurrences:   1    Discharge patient     Standing Status:   Standing     Number of Occurrences:   1     Order Specific Question:   Is this a planned readmission (within 30 days)?     Answer:   No     No orders of the defined types were placed in this encounter.      History of Present Illness:  Cristofer Wiley is a 58 y.o. male who presents for a follow up office visit in regards to low back pain.   The patient’s current symptoms include 8 out of 10 sharp and stabbing throbbing pain at the time          I have personally reviewed and/or updated the patient's past medical history, past surgical history, family history, social history, current medications, allergies, and vital signs today.     Review of Systems   Musculoskeletal:  Positive for arthralgias and back pain.   All other systems reviewed and are negative.      Patient Active Problem List   Diagnosis    Tobacco abuse    Essential hypertension    H/O suicide attempt    H/O cervical spine surgery    Chronic hyponatremia    Dietary folate deficiency anemia    Ventral hernia without obstruction or gangrene    Hepatomegaly    Hypomagnesemia    Elevated alkaline phosphatase level    Alcoholic hepatitis without ascites    Vitamin D deficiency    Iron deficiency    Urinary retention    Bladder wall thickening    Hypophosphatemia    Generalized weakness    Malnutrition of moderate degree (HCC)    Hyponatremia    Hypokalemia    Continuous chronic alcoholism (HCC)    Incisional hernia    Hx of seizure disorder    Seizure-like activity (HCC)    Diarrhea    Abnormality of pancreatic duct    Allergic rhinitis    Microcytic anemia    Abdominal wound dehiscence    Cervical disc disorder with myelopathy    Cervical spinal stenosis    Depression    Left foot drop    Lumbar radiculopathy    Neuropathy involving both lower extremities    Peripheral neuropathy    T6 vertebral fracture (HCC)    Alcoholic cirrhosis of liver  without ascites (HCC)    Thrombocytopenia (HCC)    Closed fracture of left olecranon process, initial encounter    Iron deficiency anemia due to chronic blood loss    Duodenal ulcer    Tubular adenoma    Traore esophagus    Celiac artery stenosis (HCC)    Pancreatic mass    Pancytopenia (HCC)    Tobacco use    Constipation    Transaminitis    Lesion of spleen    Left anterior fascicular block    Abnormal EKG    Acute on chronic pancreatitis (HCC)    Pancreatic pseudocyst    COPD (chronic obstructive pulmonary disease) (HCC)    Ileus (HCC)    Ambulatory dysfunction    Current every day smoker    Fall    Hx of duodenal ulcer    Neck pain    Alcohol use    Thoracic compression fracture (HCC)    Aortic ectasia (HCC)    Rib fractures    Seizure disorder (HCC)    Hypoxia    Traore's esophagus    Elevated d-dimer    COVID-19 virus infection    Hypocalcemia    Clavicular fracture    Low serum cortisol level    Chronic anemia    Osteoporosis with current pathological fracture    Abnormal CXR    Moderate protein-calorie malnutrition (HCC)    Esophageal abnormality    Insomnia    History of alcohol abuse    Hypoglycemia    Hypotension    Closed compression fracture of third lumbar vertebra (HCC)    Sacral fracture, closed (HCC)    Coagulopathy (HCC)    Closed fracture of transverse process of lumbar vertebra (HCC)    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)    Gastroesophageal reflux disease with esophagitis without hemorrhage    Primary osteoarthritis of knee    Chronic pain syndrome    Lumbar spondylosis       Past Medical History:   Diagnosis Date    Alcohol abuse     Traore esophagus     Bowel obstruction (HCC)     Bowel perforation (HCC)     Cardiac disease     Continuous chronic alcoholism (HCC) 10/5/2017    COPD (chronic obstructive pulmonary disease) (HCC)     History of shoulder surgery     Right shoulder    History of transfusion     Hx of cervical spine surgery     Hypertension     Incisional hernia  10/8/2017    MI, old     Mitral regurgitation     Psychiatric disorder     Seizures (HCC)        Past Surgical History:   Procedure Laterality Date    APPENDECTOMY      BACK SURGERY      CHOLECYSTECTOMY      ESOPHAGOGASTRODUODENOSCOPY N/A 11/28/2016    Procedure: ESOPHAGOGASTRODUODENOSCOPY (EGD);  Surgeon: Darryl Monroy MD;  Location:  GI LAB;  Service:     GALLBLADDER SURGERY      LAPAROTOMY N/A 10/25/2016    Procedure: LAPAROTOMY EXPLORATORY;  Surgeon: Renzo Harper MD;  Location: [facility];  Service:     MOUTH SURGERY      NERVE BLOCK Bilateral 2/15/2024    Procedure: Bilateral L4-5 and L5-S1 medial branch block #1;  Surgeon: Jonathan Garcia MD;  Location: [facility];  Service: Pain Management     NERVE BLOCK Bilateral 3/7/2024    Procedure: BLOCK MEDIAL BRANCH B/L L4-5 and L5-S1 MBB#2;  Surgeon: Jonathan Garcia MD;  Location: [facility];  Service: Pain Management     PERCUTANEOUS PINNING FEMORAL NECK FRACTURE      SHOULDER SURGERY Right     SHOULDER SURGERY      SMALL INTESTINE SURGERY      STOMACH SURGERY      bal surgery       Family History   Problem Relation Age of Onset    Breast cancer Mother     Prostate cancer Father     Skin cancer Brother        Social History     Occupational History    Not on file   Tobacco Use    Smoking status: Every Day     Current packs/day: 1.00     Average packs/day: 1 pack/day for 35.0 years (35.0 ttl pk-yrs)     Types: Cigarettes    Smokeless tobacco: Never   Vaping Use    Vaping status: Never Used   Substance and Sexual Activity    Alcohol use: Not Currently     Alcohol/week: 42.0 standard drinks of alcohol     Types: 42 Cans of beer per week     Comment: no alcoholic drinks for 23 months - now non-alcoholic drinks    Drug use: No    Sexual activity: Not Currently     Partners: Female       No current facility-administered medications on file prior to encounter.     Current Outpatient Medications on File Prior to Encounter   Medication Sig    albuterol  "(PROVENTIL HFA,VENTOLIN HFA) 90 mcg/act inhaler Inhale 2 puffs every 6 (six) hours as needed for wheezing    calcium carbonate (OS-KORI) 600 MG tablet Take 1 tablet (600 mg total) by mouth daily    Cholecalciferol 25 MCG (1000 UT) tablet Take 1 tablet (1,000 Units total) by mouth daily    cyanocobalamin (VITAMIN B-12) 100 mcg tablet Take 1 tablet (100 mcg total) by mouth daily    Diclofenac Sodium (VOLTAREN) 1 % Apply 2 g topically 4 (four) times a day    doxepin (SINEquan) 50 mg capsule Take 1 capsule (50 mg total) by mouth daily at bedtime    folic acid (FOLVITE) 1 mg tablet Take 1 tablet (1,000 mcg total) by mouth daily    levETIRAcetam (KEPPRA) 750 mg tablet Take 1 tablet (750 mg total) by mouth every 12 (twelve) hours    lisinopril (ZESTRIL) 2.5 mg tablet Take 1 tablet (2.5 mg total) by mouth daily    Multiple Vitamin (TAB-A-BONI PO) Take 1 tablet by mouth daily    omeprazole (PriLOSEC) 40 MG capsule Take 1 capsule (40 mg total) by mouth 2 (two) times a day    pregabalin (LYRICA) 100 mg capsule Take 1 capsule (100 mg total) by mouth 3 (three) times a day    sodium chloride 1 g tablet TAKE 2 TABLETS BY MOUTH THREE TIMES DAILY WITH MEALS    umeclidinium-vilanterol (Anoro Ellipta) 62.5-25 mcg/actuation inhaler Inhale 1 puff daily       No Known Allergies    Physical Exam:    /87   Pulse 72   Temp (!) 96.4 °F (35.8 °C) (Tympanic)   Resp 20   Ht 5' 5\" (1.651 m)   Wt 64.4 kg (142 lb)   SpO2 97%   BMI 23.63 kg/m²     Constitutional:normal, well developed, well nourished, alert, in no distress and non-toxic and no overt pain behavior.  Eyes:anicteric  HEENT:grossly intact  Neck:supple, symmetric, trachea midline and no masses   Pulmonary:even and unlabored  Cardiovascular:No edema or pitting edema present  Skin:Normal without rashes or lesions and well hydrated  Psychiatric:Mood and affect appropriate  Neurologic:Cranial Nerves II-XII grossly intact  Musculoskeletal:normal        "

## 2024-04-03 NOTE — OP NOTE
OPERATIVE REPORT  PATIENT NAME: Cristofer Wiley    :  1965  MRN: [id number]  Pt Location: [facility] 01    SURGERY DATE: 4/3/2024    Surgeons and Role:     * Jonathan Garcia MD - Primary    Preop Diagnosis:  Lumbar spondylosis [M47.816]    Post-Op Diagnosis Codes:     * Lumbar spondylosis [M47.816]    Procedure(s):  Left - Left L4-L5 and L5-S1 radiofrequency ablation    Specimen(s):  * No specimens in log *    Estimated Blood Loss:   Minimal    Drains:  * No LDAs found *    Anesthesia Type:   Local    Operative Indications:  Lumbar spondylosis [M47.816]      Operative Findings:  same    Complications:   None    Procedure and Technique:  Fluoroscopically-guided Radiofrequency denervation of the left L4-L5 and L5-S1 facet joint(s)       After discussing the risks, benefits, and alternatives to the procedure, the patient expressed understanding and wished to proceed.  The patient was brought to the fluoroscopy suite and placed in the prone position.  Procedural pause conducted to verify:  correct patient identity, procedure to be performed and as applicable, correct side and site, correct patient position, and availability of implants, special equipment and special requirements.  Using fluoroscopy, the junction of the transverse process and superior articulating process of the left 4, 5, S1 levels were identified and marked.  The skin was sterilely prepped and draped in the usual fashion using Chloraprep skin prep.  The skin and subcutaneous tissue were anesthetized with 0.5 % lidocaine.   Using fluoroscopic guidance, a 100 mm 18 gauge RFK RF cannula with a 10 mm active tip was advanced to each target.  This was confirmed using PA, lateral and oblique fluoroscopic views.  Motor testing was performed at 2Hz up to 2.5 volts, and the measured tissue impedances were satisfactory.  With final needle positioning, there was no evidence of radicular stimulation.  Prior to lesioning, 1cc of 2% lidocaine was injected at  each site.  After waiting 2 minutes for local anesthesia to take effect, lesioning was performed at 90 degrees Celsius for 90 seconds. A slight repositioning of the needles was performed an lesioning was again performed at 90 degreees Celsius for 90 seconds. After RF treatment, each site received 1cc of 0.25% bupivacaine. The patient tolerated the procedure well and there were no apparent complications.  After appropriate observation, the patient was dismissed from the clinic in good condition under their own power.    I was present for the entire procedure.    Patient Disposition:  hemodynamically stable        SIGNATURE: Jonathan Garcia MD  DATE: April 3, 2024  TIME: 1:03 PM

## 2024-04-04 ENCOUNTER — TELEPHONE (OUTPATIENT)
Dept: PAIN MEDICINE | Facility: CLINIC | Age: 59
End: 2024-04-04

## 2024-04-04 NOTE — TELEPHONE ENCOUNTER
Left L4-5 and L5-S1 RFA at [facility] 4/3/24 with RM      Next Procedure for right side is 4/26 at [facility]

## 2024-04-04 NOTE — TELEPHONE ENCOUNTER
S/W pt. Pt stated needle sites look good, denies S/S of infection, denies fever, mild soreness and denies sunburn like sensation.   Pain rating today:  1/10  Advised pt if he/she does get pain to take prescribed or OTC pain medications and/or use ice/heat and it will take 4-6 weeks to take full effect. Pt verbalized understanding.  Confirmed next appt with pt.

## 2024-04-05 ENCOUNTER — OFFICE VISIT (OUTPATIENT)
Dept: NEPHROLOGY | Facility: CLINIC | Age: 59
End: 2024-04-05

## 2024-04-05 VITALS
DIASTOLIC BLOOD PRESSURE: 82 MMHG | HEIGHT: 66 IN | HEART RATE: 70 BPM | SYSTOLIC BLOOD PRESSURE: 122 MMHG | WEIGHT: 141.6 LBS | OXYGEN SATURATION: 97 % | BODY MASS INDEX: 22.76 KG/M2

## 2024-04-05 DIAGNOSIS — E87.1 HYPONATREMIA: Primary | ICD-10-CM

## 2024-04-05 DIAGNOSIS — I10 ESSENTIAL HYPERTENSION: ICD-10-CM

## 2024-04-05 NOTE — PATIENT INSTRUCTIONS
It was nice to see you today. Please check sodium level monthly. Try to reduce fluid intake down to 70 ounces per day. This will help keep your sodium level appropriate.

## 2024-04-05 NOTE — PROGRESS NOTES
Nephrology   Office Follow-Up  Cristofer Wiley 58 y.o. male MRN: [id number]    Encounter: [id number]        Assessment & Plan    Cristofer Wiley was seen in the [place] office today. All diagnoses and orders for visit:     1. Hyponatremia  Chronic, asymptomatic, mild to moderate, presumed hypoosmolar.  Patient certainly at risk for ADH over secretion given chronic pain and cirrhosis of the liver.  He is also over hydrating drinking upwards of 96 ounces per day per his report.  Recommend reducing fluid intake to 70 ounces per day.  Will check BMP monthly.  No change to sodium tablets at this time but would prefer to wean off if able given side effect profile.  At present, no edema nor hypertension noted.  -     Basic metabolic panel; Standing  -     Urinalysis with microscopic; Future  -     Osmolality, urine  -     Sodium, urine, random; Future  -     CBC and Platelet; Future  2. Essential hypertension  Pressure is well-controlled.  As a reminder, thiazide and thiazide like diuretics are absolutely contraindicated        HPI: Cristofer Wiley is a 58 y.o. male who is here for scheduled follow-up regarding hyponatremia    Other pertinent problems include alcoholic cirrhosis of the liver without ascites, hypertension, seizure disorder, lumbar spondylosis, history of severe acute kidney injury, smoker    Patient has not been seen in over a year.  Most recent serum sodium level 129 mmol/L.  Tells me he is drinking 96 ounces but is compliant with 2 g sodium tablets 3 times daily.  Fortunately, he has no edema and is normotensive.    Recommend patient reduce fluid intake to around 70 ounces per day.  Will follow monthly BMPs and have him return in 6 months unless needed sooner.  Will check urine studies with next lab work.  Patient is aware of plan and agreeable.        ROS  Review of Systems   Constitutional:  Negative for chills and fever.        Patient states he just had a left-sided procedure on his back for pain relief and is  going in again to do the right side later this month.  He is drinking 90 ounces a day   HENT:  Negative for ear pain and sore throat.    Eyes:  Negative for pain and visual disturbance.   Respiratory:  Negative for cough and shortness of breath.    Cardiovascular:  Negative for chest pain and palpitations.   Gastrointestinal:  Negative for abdominal pain and vomiting.   Genitourinary:  Negative for dysuria and hematuria.   Musculoskeletal:  Positive for arthralgias, back pain and gait problem.   Skin:  Negative for color change and rash.   Neurological:  Negative for seizures and syncope.   All other systems reviewed and are negative.      Allergies: Patient has no known allergies.    Medications:   Current Outpatient Medications:     albuterol (PROVENTIL HFA,VENTOLIN HFA) 90 mcg/act inhaler, Inhale 2 puffs every 6 (six) hours as needed for wheezing, Disp: 9 g, Rfl: 0    calcium carbonate (OS-KORI) 600 MG tablet, Take 1 tablet (600 mg total) by mouth daily, Disp: 90 tablet, Rfl: 3    Cholecalciferol 25 MCG (1000 UT) tablet, Take 1 tablet (1,000 Units total) by mouth daily, Disp: 90 tablet, Rfl: 2    cyanocobalamin (VITAMIN B-12) 100 mcg tablet, Take 1 tablet (100 mcg total) by mouth daily, Disp: 90 tablet, Rfl: 2    Diclofenac Sodium (VOLTAREN) 1 %, Apply 2 g topically 4 (four) times a day (Patient taking differently: Apply 2 g topically if needed), Disp: 350 g, Rfl: 2    doxepin (SINEquan) 50 mg capsule, Take 1 capsule (50 mg total) by mouth daily at bedtime, Disp: 90 capsule, Rfl: 2    folic acid (FOLVITE) 1 mg tablet, Take 1 tablet (1,000 mcg total) by mouth daily, Disp: 90 tablet, Rfl: 1    levETIRAcetam (KEPPRA) 750 mg tablet, Take 1 tablet (750 mg total) by mouth every 12 (twelve) hours, Disp: 180 tablet, Rfl: 2    lisinopril (ZESTRIL) 2.5 mg tablet, Take 1 tablet (2.5 mg total) by mouth daily, Disp: 90 tablet, Rfl: 2    Multiple Vitamin (TAB-A-BONI PO), Take 1 tablet by mouth daily, Disp: , Rfl:     omeprazole  (PriLOSEC) 40 MG capsule, Take 1 capsule (40 mg total) by mouth 2 (two) times a day, Disp: 180 capsule, Rfl: 1    pregabalin (LYRICA) 100 mg capsule, Take 1 capsule (100 mg total) by mouth 3 (three) times a day, Disp: 90 capsule, Rfl: 2    sodium chloride 1 g tablet, TAKE 2 TABLETS BY MOUTH THREE TIMES DAILY WITH MEALS, Disp: 240 tablet, Rfl: 0    thiamine (VITAMIN B1) 100 mg tablet, Take 1 tablet (100 mg total) by mouth daily, Disp: 90 tablet, Rfl: 1    umeclidinium-vilanterol (Anoro Ellipta) 62.5-25 mcg/actuation inhaler, Inhale 1 puff daily, Disp: 180 blister, Rfl: 2    Past Medical History:   Diagnosis Date    Alcohol abuse     Traore esophagus     Bowel obstruction (HCC)     Bowel perforation (HCC)     Cardiac disease     Continuous chronic alcoholism (HCC) 10/5/2017    COPD (chronic obstructive pulmonary disease) (HCC)     History of shoulder surgery     Right shoulder    History of transfusion     Hx of cervical spine surgery     Hypertension     Incisional hernia 10/8/2017    [facility], [facility]     Mitral regurgitation     Psychiatric disorder     Seizures (HCC)      Past Surgical History:   Procedure Laterality Date    APPENDECTOMY      BACK SURGERY      CHOLECYSTECTOMY      ESOPHAGOGASTRODUODENOSCOPY N/A 11/28/2016    Procedure: ESOPHAGOGASTRODUODENOSCOPY (EGD);  Surgeon: Darryl Monroy MD;  Location:  [facility] LAB;  Service:     GALLBLADDER SURGERY      LAPAROTOMY N/A 10/25/2016    Procedure: LAPAROTOMY EXPLORATORY;  Surgeon: Renzo Harper MD;  Location: [facility];  Service:     MOUTH SURGERY      NERVE BLOCK Bilateral 2/15/2024    Procedure: Bilateral L4-5 and L5-S1 medial branch block #1;  Surgeon: Jonathan Garcia MD;  Location: [facility];  Service: Pain Management     NERVE BLOCK Bilateral 3/7/2024    Procedure: BLOCK MEDIAL BRANCH B/L L4-5 and L5-S1 MBB#2;  Surgeon: Jonathan Garcia MD;  Location: [facility];  Service: Pain Management     PERCUTANEOUS PINNING FEMORAL NECK FRACTURE       "RADIOFREQUENCY ABLATION Left 4/3/2024    Procedure: Left L4-L5 and L5-S1 radiofrequency ablation;  Surgeon: Jonathan Garcia MD;  Location: [facility];  Service: Pain Management     SHOULDER SURGERY Right     SHOULDER SURGERY      SMALL INTESTINE SURGERY      STOMACH SURGERY      bal surgery     Family History   Problem Relation Age of Onset    Breast cancer Mother     Prostate cancer Father     Skin cancer Brother       reports that he has been smoking cigarettes. He has a 35 pack-year smoking history. He has never used smokeless tobacco. He reports that he does not currently use alcohol after a past usage of about 42.0 standard drinks of alcohol per week. He reports that he does not use drugs.      Physical Exam:   Vitals:    04/05/24 0752   BP: 122/82   BP Location: Right arm   Patient Position: Sitting   Pulse: 70   SpO2: 97%   Weight: 64.2 kg (141 lb 9.6 oz)   Height: 5' 6\" (1.676 m)     Body mass index is 22.85 kg/m².    General: conscious, cooperative, in no acute distress, appears stated age  Eyes: conjunctivae pale, anicteric sclerae  ENT: lips and mucous membranes moist  Neck: supple, no JVD, no masses  Chest:  essentially clear breath sounds bilaterally, no crackles, ronchus or wheezings  CVS: S1 & S2, normal rate, regular rhythm  Abdomen: soft, non-tender, non-distended, normoactive bowel sounds, rounded  Extremities: no edema of both legs  Skin: no rash   Neuro: awake, alert, oriented       Diagnostic Data:  Lab: I have personally reviewed pertinent lab results.,   CBC:       CMP: No results found for: \"SODIUM\", \"K\", \"CL\", \"CO2\", \"ANIONGAP\", \"BUN\", \"CREATININE\", \"GLUCOSE\", \"CALCIUM\", \"AST\", \"ALT\", \"ALKPHOS\", \"PROT\", \"BILITOT\", \"EGFR\",   PT/INR: No results found for: \"PT\", \"INR\",   Magnesium: No components found for: \"MAG\",  Phosphorous: No results found for: \"PHOS\"    Patient Instructions   It was nice to see you today. Please check sodium level monthly. Try to reduce fluid intake down to 70 ounces " "per day. This will help keep your sodium level appropriate.     Portions of the record may have been created with voice recognition software. Occasional wrong word or \"sound a like\" substitutions may have occurred due to the inherent limitations of voice recognition software. Read the chart carefully and recognize, using context, where substitutions have occurred.    If you have any questions, please contact the dictating provider.  "

## 2024-04-15 DIAGNOSIS — M51.36 LUMBAR DEGENERATIVE DISC DISEASE: ICD-10-CM

## 2024-04-15 DIAGNOSIS — M48.061 SPINAL STENOSIS OF LUMBAR REGION, UNSPECIFIED WHETHER NEUROGENIC CLAUDICATION PRESENT: ICD-10-CM

## 2024-04-15 DIAGNOSIS — M54.12 RADICULOPATHY, CERVICAL REGION: ICD-10-CM

## 2024-04-15 DIAGNOSIS — M54.12 CERVICAL RADICULOPATHY: ICD-10-CM

## 2024-04-15 DIAGNOSIS — M47.816 LUMBAR SPONDYLOSIS: ICD-10-CM

## 2024-04-15 NOTE — TELEPHONE ENCOUNTER
Pt asking for rf of Lyrica 100mg TID. Pt lf on 4/13/24. Pt would like to have rf avail if poss for when needed in May.     Pls advise. Thank you!

## 2024-04-15 NOTE — TELEPHONE ENCOUNTER
Reason for call:   [x] Refill   [] Prior Auth  [] Other:     Office:   [] PCP/Provider -   [x] Specialty/Provider - Spine and pain       Medication:     pregabalin (LYRICA) 100 mg capsule       Dose/Frequency:     100 mg, Oral, 3 times daily       Quantity: 90    Pharmacy: [facility] [address] [place], [place] [id number] [address], [address]     Does the patient have enough for 3 days?   [x] Yes   [] No - Send as HP to POD

## 2024-04-15 NOTE — TELEPHONE ENCOUNTER
Patient returned call from office and reached the RX Refill Line. Message is being forwarded to the office.     Patient is requesting a call back at #[phone].     Patient wants office to know that he realizes it's too soon to refill Lyrica, but wants a script to be on hold at the pharmacy that can be filled when it's time.

## 2024-04-19 RX ORDER — PREGABALIN 100 MG/1
100 CAPSULE ORAL 3 TIMES DAILY
Qty: 90 CAPSULE | Refills: 0 | Status: SHIPPED | OUTPATIENT
Start: 2024-04-19 | End: 2024-05-19

## 2024-04-20 DIAGNOSIS — E87.1 HYPONATREMIA: ICD-10-CM

## 2024-04-22 DIAGNOSIS — E87.1 HYPONATREMIA: ICD-10-CM

## 2024-04-22 RX ORDER — SODIUM CHLORIDE 1 G/1
TABLET ORAL
Qty: 240 TABLET | Refills: 0 | OUTPATIENT
Start: 2024-04-22

## 2024-04-22 RX ORDER — SODIUM CHLORIDE 1 G/1
1 TABLET ORAL 3 TIMES DAILY
Qty: 240 TABLET | Refills: 0 | Status: SHIPPED | OUTPATIENT
Start: 2024-04-22

## 2024-04-22 RX ORDER — SODIUM CHLORIDE 1 G/1
TABLET ORAL
Qty: 240 TABLET | Refills: 0 | Status: CANCELLED | OUTPATIENT
Start: 2024-04-22

## 2024-04-26 ENCOUNTER — TELEPHONE (OUTPATIENT)
Dept: PAIN MEDICINE | Facility: CLINIC | Age: 59
End: 2024-04-26

## 2024-04-26 ENCOUNTER — APPOINTMENT (OUTPATIENT)
Dept: RADIOLOGY | Facility: HOSPITAL | Age: 59
End: 2024-04-26
Payer: COMMERCIAL

## 2024-04-26 ENCOUNTER — HOSPITAL ENCOUNTER (OUTPATIENT)
Facility: HOSPITAL | Age: 59
Setting detail: OUTPATIENT SURGERY
Discharge: HOME | End: 2024-04-26
Attending: ANESTHESIOLOGY | Admitting: ANESTHESIOLOGY
Payer: COMMERCIAL

## 2024-04-26 VITALS
WEIGHT: 141 LBS | SYSTOLIC BLOOD PRESSURE: 122 MMHG | OXYGEN SATURATION: 94 % | RESPIRATION RATE: 20 BRPM | HEIGHT: 66 IN | HEART RATE: 75 BPM | BODY MASS INDEX: 22.66 KG/M2 | DIASTOLIC BLOOD PRESSURE: 77 MMHG | TEMPERATURE: 97.2 F

## 2024-04-26 PROCEDURE — 64636 DESTROY L/S FACET JNT ADDL: CPT | Performed by: ANESTHESIOLOGY

## 2024-04-26 PROCEDURE — 64635 DESTROY LUMB/SAC FACET JNT: CPT | Performed by: ANESTHESIOLOGY

## 2024-04-26 RX ORDER — LIDOCAINE HYDROCHLORIDE 10 MG/ML
INJECTION, SOLUTION EPIDURAL; INFILTRATION; INTRACAUDAL; PERINEURAL AS NEEDED
Status: DISCONTINUED | OUTPATIENT
Start: 2024-04-26 | End: 2024-04-26 | Stop reason: HOSPADM

## 2024-04-26 RX ORDER — LIDOCAINE HYDROCHLORIDE 20 MG/ML
INJECTION, SOLUTION EPIDURAL; INFILTRATION; INTRACAUDAL; PERINEURAL AS NEEDED
Status: DISCONTINUED | OUTPATIENT
Start: 2024-04-26 | End: 2024-04-26 | Stop reason: HOSPADM

## 2024-04-26 NOTE — OP NOTE
OPERATIVE REPORT  PATIENT NAME: Cristofer Wiley    :  1965  MRN: [id number]  Pt Location: [facility]    SURGERY DATE: 2024    Surgeons and Role:     * Jonathan Garcia MD - Primary    Preop Diagnosis:  Lumbar spondylosis [M47.816]    Post-Op Diagnosis Codes:     * Lumbar spondylosis [M47.816]    Procedure(s):  Right - Right L4-L5 and L5-S1 radiofrequency ablation    Specimen(s):  * No specimens in log *    Estimated Blood Loss:   Minimal    Drains:  * No LDAs found *    Anesthesia Type:   Local    Operative Indications:  Lumbar spondylosis [M47.816]      Operative Findings:  same    Complications:   None    Procedure and Technique:   Fluoroscopically-guided Radiofrequency denervation of the right L4-L5 and L5-S1 facet joint(s)       After discussing the risks, benefits, and alternatives to the procedure, the patient expressed understanding and wished to proceed.  The patient was brought to the fluoroscopy suite and placed in the prone position.  Procedural pause conducted to verify:  correct patient identity, procedure to be performed and as applicable, correct side and site, correct patient position, and availability of implants, special equipment and special requirements.  Using fluoroscopy, the junction of the transverse process and superior articulating process of the right L4, 5, levels were identified and marked.  The skin was sterilely prepped and draped in the usual fashion using Chloraprep skin prep.  The skin and subcutaneous tissue were anesthetized with 0.5 % lidocaine.   Using fluoroscopic guidance, a 100 mm 18 gauge RFK RF cannula with a 10 mm active tip was advanced to each target.  This was confirmed using PA, lateral and oblique fluoroscopic views.  Motor testing was performed at 2Hz up to 2.5 volts, and the measured tissue impedances were satisfactory.  With final needle positioning, there was no evidence of radicular stimulation.  Prior to lesioning, 1cc of 2% lidocaine was  injected at each site.  After waiting 2 minutes for local anesthesia to take effect, lesioning was performed at 90 degrees Celsius for 90 seconds. A slight repositioning of the needles was performed an lesioning was again performed at 90 degreees Celsius for 90 seconds. After RF treatment, each site received 1cc of 0.25% bupivacaine. The patient tolerated the procedure well and there were no apparent complications.  After appropriate observation, the patient was dismissed from the clinic in good condition under their own power.    I was present for the entire procedure.    Patient Disposition:  hemodynamically stable        SIGNATURE: Jonathan Garcia MD  DATE: April 26, 2024  TIME: 10:53 AM

## 2024-04-26 NOTE — INTERVAL H&P NOTE
H&P reviewed. After examining the patient I find no changes in the patients condition since the H&P had been written.    Vitals:    04/26/24 1003   BP: 139/86   Pulse: 78   Resp: 18   Temp: (!) 97 °F (36.1 °C)   SpO2: 96%

## 2024-04-26 NOTE — H&P
Assessment:  Lumbar spondylosis    Plan:  Cristofer Wiley is a 58 y.o. male with complaints of low back pain no presents to surgical center for procedure.  We will perform a Procedure(s) (LRB):  Right L4-L5 and L5-S1 radiofrequency ablation (Right)   2. Follow-up 1 month after injection    Complete risks and benefits including bleeding, infection, tissue reaction, nerve injury and allergic reaction were discussed. The approach was demonstrated using models and literature was provided. Verbal and written consent was obtained.    My impressions and treatment recommendations were discussed in detail with the patient who verbalized understanding and had no further questions.  Discharge instructions were provided. I personally saw and examined the patient and I agree with the above discussed plan of care.    Orders Placed This Encounter   Procedures    FL spine and pain procedure     Standing Status:   Standing     Number of Occurrences:   1     Order Specific Question:   Reason for Exam:     Answer:   RFA     Order Specific Question:   Anticoagulant hold needed?     Answer:   n/a     No orders of the defined types were placed in this encounter.      History of Present Illness:  Cristofer Wiley is a 58 y.o. male who presents for a follow up office visit in regards to low back pain.   The patient’s current symptoms include 8 out of 10 sharp and stabbing throbbing without a particular time pattern.      I have personally reviewed and/or updated the patient's past medical history, past surgical history, family history, social history, current medications, allergies, and vital signs today.     Review of Systems   Musculoskeletal:  Positive for arthralgias and back pain.   All other systems reviewed and are negative.      Patient Active Problem List   Diagnosis    Tobacco abuse    Essential hypertension    H/O suicide attempt    H/O cervical spine surgery    Chronic hyponatremia    Dietary folate deficiency anemia    Ventral hernia  without obstruction or gangrene    Hepatomegaly    Hypomagnesemia    Elevated alkaline phosphatase level    Alcoholic hepatitis without ascites    Vitamin D deficiency    Iron deficiency    Urinary retention    Bladder wall thickening    Hypophosphatemia    Generalized weakness    Malnutrition of moderate degree (HCC)    Hyponatremia    Hypokalemia    Continuous chronic alcoholism (HCC)    Incisional hernia    Hx of seizure disorder    Seizure-like activity (HCC)    Diarrhea    Abnormality of pancreatic duct    Allergic rhinitis    Microcytic anemia    Abdominal wound dehiscence    Cervical disc disorder with myelopathy    Cervical spinal stenosis    Depression    Left foot drop    Lumbar radiculopathy    Neuropathy involving both lower extremities    Peripheral neuropathy    T6 vertebral fracture (HCC)    Alcoholic cirrhosis of liver without ascites (HCC)    Thrombocytopenia (HCC)    Closed fracture of left olecranon process, initial encounter    Iron deficiency anemia due to chronic blood loss    Duodenal ulcer    Tubular adenoma    Traore esophagus    Celiac artery stenosis (HCC)    Pancreatic mass    Pancytopenia (HCC)    Tobacco use    Constipation    Transaminitis    Lesion of spleen    Left anterior fascicular block    Abnormal EKG    Acute on chronic pancreatitis (HCC)    Pancreatic pseudocyst    COPD (chronic obstructive pulmonary disease) (HCC)    Ileus (HCC)    Ambulatory dysfunction    Current every day smoker    Fall    Hx of duodenal ulcer    Neck pain    Alcohol use    Thoracic compression fracture (HCC)    Aortic ectasia (HCC)    Rib fractures    Seizure disorder (HCC)    Hypoxia    Traore's esophagus    Elevated d-dimer    COVID-19 virus infection    Hypocalcemia    Clavicular fracture    Low serum cortisol level    Chronic anemia    Osteoporosis with current pathological fracture    Abnormal CXR    Moderate protein-calorie malnutrition (HCC)    Esophageal abnormality    Insomnia    History of  alcohol abuse    Hypoglycemia    Hypotension    Closed compression fracture of third lumbar vertebra (HCC)    Sacral fracture, closed (HCC)    Coagulopathy (HCC)    Closed fracture of transverse process of lumbar vertebra (HCC)    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)    Gastroesophageal reflux disease with esophagitis without hemorrhage    Primary osteoarthritis of knee    Chronic pain syndrome    Lumbar spondylosis       Past Medical History:   Diagnosis Date    Alcohol abuse     Traore esophagus     Bowel obstruction (HCC)     Bowel perforation (HCC)     Cardiac disease     Continuous chronic alcoholism (HCC) 10/5/2017    COPD (chronic obstructive pulmonary disease) (HCC)     History of shoulder surgery     Right shoulder    History of transfusion     Hx of cervical spine surgery     Hypertension     Incisional hernia 10/8/2017    MI, old     Mitral regurgitation     Psychiatric disorder     Seizures (HCC)        Past Surgical History:   Procedure Laterality Date    APPENDECTOMY      BACK SURGERY      CHOLECYSTECTOMY      ESOPHAGOGASTRODUODENOSCOPY N/A 11/28/2016    Procedure: ESOPHAGOGASTRODUODENOSCOPY (EGD);  Surgeon: Darryl Monroy MD;  Location:  GI LAB;  Service:     GALLBLADDER SURGERY      LAPAROTOMY N/A 10/25/2016    Procedure: LAPAROTOMY EXPLORATORY;  Surgeon: Renzo Harper MD;  Location: [facility];  Service:     MOUTH SURGERY      NERVE BLOCK Bilateral 2/15/2024    Procedure: Bilateral L4-5 and L5-S1 medial branch block #1;  Surgeon: Jonathan Garcia MD;  Location: [facility];  Service: Pain Management     NERVE BLOCK Bilateral 3/7/2024    Procedure: BLOCK MEDIAL BRANCH B/L L4-5 and L5-S1 MBB#2;  Surgeon: Jonathan Garcia MD;  Location: [facility];  Service: Pain Management     PERCUTANEOUS PINNING FEMORAL NECK FRACTURE      RADIOFREQUENCY ABLATION Left 4/3/2024    Procedure: Left L4-L5 and L5-S1 radiofrequency ablation;  Surgeon: Jonathan Garcia MD;  Location: [facility];  Service: Pain Management     SHOULDER SURGERY Right     SHOULDER SURGERY      SMALL INTESTINE SURGERY      STOMACH SURGERY      bal surgery       Family History   Problem Relation Age of Onset    Breast cancer Mother     Prostate cancer Father     Skin cancer Brother        Social History     Occupational History    Not on file   Tobacco Use    Smoking status: Every Day     Current packs/day: 1.00     Average packs/day: 1 pack/day for 35.0 years (35.0 ttl pk-yrs)     Types: Cigarettes    Smokeless tobacco: Never   Vaping Use    Vaping status: Never Used   Substance and Sexual Activity    Alcohol use: Not Currently     Alcohol/week: 42.0 standard drinks of alcohol     Types: 42 Cans of beer per week     Comment: no alcoholic drinks for 23 months - now non-alcoholic drinks    Drug use: No    Sexual activity: Not Currently     Partners: Female       No current facility-administered medications on file prior to encounter.     Current Outpatient Medications on File Prior to Encounter   Medication Sig    calcium carbonate (OS-KORI) 600 MG tablet Take 1 tablet (600 mg total) by mouth daily    Cholecalciferol 25 MCG (1000 UT) tablet Take 1 tablet (1,000 Units total) by mouth daily    cyanocobalamin (VITAMIN B-12) 100 mcg tablet Take 1 tablet (100 mcg total) by mouth daily    Diclofenac Sodium (VOLTAREN) 1 % Apply 2 g topically 4 (four) times a day (Patient taking differently: Apply 2 g topically if needed)    doxepin (SINEquan) 50 mg capsule Take 1 capsule (50 mg total) by mouth daily at bedtime    folic acid (FOLVITE) 1 mg tablet Take 1 tablet (1,000 mcg total) by mouth daily    levETIRAcetam (KEPPRA) 750 mg tablet Take 1 tablet (750 mg total) by mouth every 12 (twelve) hours    lisinopril (ZESTRIL) 2.5 mg tablet Take 1 tablet (2.5 mg total) by mouth daily    Multiple Vitamin (TAB-A-BONI PO) Take 1 tablet by mouth daily    omeprazole (PriLOSEC) 40 MG capsule Take 1 capsule (40 mg total) by mouth 2 (two) times a  "day    umeclidinium-vilanterol (Anoro Ellipta) 62.5-25 mcg/actuation inhaler Inhale 1 puff daily    albuterol (PROVENTIL HFA,VENTOLIN HFA) 90 mcg/act inhaler Inhale 2 puffs every 6 (six) hours as needed for wheezing       No Known Allergies    Physical Exam:    /86   Pulse 78   Temp (!) 97 °F (36.1 °C) (Tympanic)   Resp 18   Ht 5' 6\" (1.676 m)   Wt 64 kg (141 lb)   SpO2 96%   BMI 22.76 kg/m²     Constitutional:normal, well developed, well nourished, alert, in no distress and non-toxic and no overt pain behavior.  Eyes:anicteric  HEENT:grossly intact  Neck:supple, symmetric, trachea midline and no masses   Pulmonary:even and unlabored  Cardiovascular:No edema or pitting edema present  Skin:Normal without rashes or lesions and well hydrated  Psychiatric:Mood and affect appropriate  Neurologic:Cranial Nerves II-XII grossly intact  Musculoskeletal:antalgic        "

## 2024-04-26 NOTE — TELEPHONE ENCOUNTER
Pt s/p Rt L4-S1 RFA RM on 4/26/24 @ [facility]     Pt sched for 5/14/24 in [facility] w/ BK @ 1000.    Pls c/b pt 4/29/24.

## 2024-04-29 NOTE — TELEPHONE ENCOUNTER
Mya...    S/w pt s/p Rt L4-S1 on 4/26/24 RM @ [facility]. Pt stated needle sites look good, denies S/S of infect, denies fever, (+) soreness and denies sun burn like sensation. Pt states current pain level 1/10 soreness @ inj site. Advised pt if they have any further pain to take OTC/prescribed meds and/or use ice/heat and that it can take 4-6wks to see full effect. Confirmed nxt appt w/ pt on 5/14/24 @ 1000 BK @ [facility]. Pt verbalized understanding and states he's been able to go fishing again which is a vast improv from where he was prior to RFA's. Pt apprec of call.

## 2024-05-14 ENCOUNTER — OFFICE VISIT (OUTPATIENT)
Dept: PAIN MEDICINE | Facility: CLINIC | Age: 59
End: 2024-05-14
Payer: COMMERCIAL

## 2024-05-14 ENCOUNTER — APPOINTMENT (OUTPATIENT)
Dept: LAB | Facility: MEDICAL CENTER | Age: 59
End: 2024-05-14
Payer: COMMERCIAL

## 2024-05-14 VITALS
SYSTOLIC BLOOD PRESSURE: 133 MMHG | HEIGHT: 66 IN | WEIGHT: 139 LBS | DIASTOLIC BLOOD PRESSURE: 87 MMHG | HEART RATE: 88 BPM | BODY MASS INDEX: 22.34 KG/M2 | RESPIRATION RATE: 16 BRPM

## 2024-05-14 DIAGNOSIS — M47.816 LUMBAR SPONDYLOSIS: ICD-10-CM

## 2024-05-14 DIAGNOSIS — M51.36 LUMBAR DEGENERATIVE DISC DISEASE: ICD-10-CM

## 2024-05-14 DIAGNOSIS — G89.4 CHRONIC PAIN SYNDROME: Primary | ICD-10-CM

## 2024-05-14 DIAGNOSIS — E87.1 HYPONATREMIA: ICD-10-CM

## 2024-05-14 DIAGNOSIS — M48.061 SPINAL STENOSIS OF LUMBAR REGION, UNSPECIFIED WHETHER NEUROGENIC CLAUDICATION PRESENT: ICD-10-CM

## 2024-05-14 LAB
ANION GAP SERPL CALCULATED.3IONS-SCNC: 7 MMOL/L (ref 4–13)
BACTERIA UR QL AUTO: NORMAL /HPF
BILIRUB UR QL STRIP: NEGATIVE
BUN SERPL-MCNC: 7 MG/DL (ref 5–25)
CALCIUM SERPL-MCNC: 8.8 MG/DL (ref 8.4–10.2)
CHLORIDE SERPL-SCNC: 91 MMOL/L (ref 96–108)
CLARITY UR: CLEAR
CO2 SERPL-SCNC: 31 MMOL/L (ref 21–32)
COLOR UR: COLORLESS
CREAT SERPL-MCNC: 0.69 MG/DL (ref 0.6–1.3)
ERYTHROCYTE [DISTWIDTH] IN BLOOD BY AUTOMATED COUNT: 15.7 % (ref 11.6–15.1)
GFR SERPL CREATININE-BSD FRML MDRD: 105 ML/MIN/1.73SQ M
GLUCOSE P FAST SERPL-MCNC: 125 MG/DL (ref 65–99)
GLUCOSE UR STRIP-MCNC: NEGATIVE MG/DL
HCT VFR BLD AUTO: 38.9 % (ref 36.5–49.3)
HGB BLD-MCNC: 12.8 G/DL (ref 12–17)
HGB UR QL STRIP.AUTO: NEGATIVE
KETONES UR STRIP-MCNC: NEGATIVE MG/DL
LEUKOCYTE ESTERASE UR QL STRIP: NEGATIVE
MCH RBC QN AUTO: 29.6 PG (ref 26.8–34.3)
MCHC RBC AUTO-ENTMCNC: 32.9 G/DL (ref 31.4–37.4)
MCV RBC AUTO: 90 FL (ref 82–98)
NITRITE UR QL STRIP: NEGATIVE
NON-SQ EPI CELLS URNS QL MICRO: NORMAL /HPF
OSMOLALITY UR: 204 MMOL/KG
PH UR STRIP.AUTO: 6.5 [PH]
PLATELET # BLD AUTO: 161 THOUSANDS/UL (ref 149–390)
PMV BLD AUTO: 10.6 FL (ref 8.9–12.7)
POTASSIUM SERPL-SCNC: 4.2 MMOL/L (ref 3.5–5.3)
PROT UR STRIP-MCNC: NEGATIVE MG/DL
RBC # BLD AUTO: 4.33 MILLION/UL (ref 3.88–5.62)
RBC #/AREA URNS AUTO: NORMAL /HPF
SODIUM 24H UR-SCNC: 50 MOL/L
SODIUM SERPL-SCNC: 129 MMOL/L (ref 135–147)
SP GR UR STRIP.AUTO: 1.01 (ref 1–1.03)
UROBILINOGEN UR STRIP-ACNC: <2 MG/DL
WBC # BLD AUTO: 4.47 THOUSAND/UL (ref 4.31–10.16)
WBC #/AREA URNS AUTO: NORMAL /HPF

## 2024-05-14 PROCEDURE — 36415 COLL VENOUS BLD VENIPUNCTURE: CPT

## 2024-05-14 PROCEDURE — 84300 ASSAY OF URINE SODIUM: CPT

## 2024-05-14 PROCEDURE — 85027 COMPLETE CBC AUTOMATED: CPT

## 2024-05-14 PROCEDURE — 80048 BASIC METABOLIC PNL TOTAL CA: CPT

## 2024-05-14 PROCEDURE — 99214 OFFICE O/P EST MOD 30 MIN: CPT | Performed by: NURSE PRACTITIONER

## 2024-05-14 PROCEDURE — 81001 URINALYSIS AUTO W/SCOPE: CPT

## 2024-05-14 RX ORDER — PREGABALIN 100 MG/1
100 CAPSULE ORAL 3 TIMES DAILY
Qty: 90 CAPSULE | Refills: 2 | Status: SHIPPED | OUTPATIENT
Start: 2024-05-14 | End: 2024-08-12

## 2024-05-14 NOTE — PROGRESS NOTES
Assessment:  1. Chronic pain syndrome    2. Lumbar spondylosis    3. Lumbar degenerative disc disease    4. Spinal stenosis of lumbar region, unspecified whether neurogenic claudication present        Plan:  While the patient was in the office today, I did have a thorough conversation regarding their chronic pain syndrome, medication management, and treatment plan options.  Patient is being seen for a follow-up visit.  He underwent a right-sided L4-5 and L5-S1 radiofrequency ablation on 4/26/2024.  Left side was performed on 4/3/2024.  Overall, he is reporting about 90% improvement.  Pain has become pretty tolerable at this time.    Start physical therapy for lumbar core strengthening/stretching.    Continue Lyrica 100 mg 3 times daily.  A prescription was sent to his pharmacy with refills.    Follow-up in 3 months.      History of Present Illness:  The patient is a 58 y.o. male who presents for a follow up office visit in regards to Back Pain.   The patient’s current symptoms include complaints of low back pain.  Current pain level is a 1/10.  Quality of pain is described as pressure-like.    Current pain medications includes: Lyrica 100 mg 3 times daily.  The patient reports that this regimen is providing 90% pain relief.  The patient is reporting no side effects from this pain medication regimen.    I have personally reviewed and/or updated the patient's past medical history, past surgical history, family history, social history, current medications, allergies, and vital signs today.         Review of Systems  Review of Systems   Musculoskeletal:  Positive for back pain.   All other systems reviewed and are negative.          Past Medical History:   Diagnosis Date    Alcohol abuse     Traore esophagus     Bowel obstruction ([facility])     Bowel perforation ([facility])     Cardiac disease     Continuous chronic alcoholism ([facility]) 10/5/2017    COPD (chronic obstructive pulmonary disease) ([facility])     History of shoulder surgery      Right shoulder    History of transfusion     Hx of cervical spine surgery     Hypertension     Incisional hernia 10/8/2017    MI, old     Mitral regurgitation     Psychiatric disorder     Seizures (HCC)        Past Surgical History:   Procedure Laterality Date    APPENDECTOMY      BACK SURGERY      CHOLECYSTECTOMY      ESOPHAGOGASTRODUODENOSCOPY N/A 11/28/2016    Procedure: ESOPHAGOGASTRODUODENOSCOPY (EGD);  Surgeon: Darryl Monroy MD;  Location: [facility];  Service:     GALLBLADDER SURGERY      LAPAROTOMY N/A 10/25/2016    Procedure: LAPAROTOMY EXPLORATORY;  Surgeon: Renzo Harper MD;  [facility];  Service:     MOUTH SURGERY      NERVE BLOCK Bilateral 2/15/2024    Procedure: Bilateral L4-5 and L5-S1 medial branch block #1;  Surgeon: Jonathan Garcia MD;  [facility];  Service: Pain Management     NERVE BLOCK Bilateral 3/7/2024    Procedure: BLOCK MEDIAL BRANCH B/L L4-5 and L5-S1 MBB#2;  Surgeon: Jonathan Garcia MD;  [facility];  Service: Pain Management     PERCUTANEOUS PINNING FEMORAL NECK FRACTURE      RADIOFREQUENCY ABLATION Left 4/3/2024    Procedure: Left L4-L5 and L5-S1 radiofrequency ablation;  Surgeon: Jonathan Garcia MD;  [facility];  Service: Pain Management     RADIOFREQUENCY ABLATION Right 4/26/2024    Procedure: Right L4-L5 and L5-S1 radiofrequency ablation;  Surgeon: Jonathan Garcia MD;  [facility];  Service: Pain Management     SHOULDER SURGERY Right     SHOULDER SURGERY      SMALL INTESTINE SURGERY      STOMACH SURGERY      bal surgery       Family History   Problem Relation Age of Onset    Breast cancer Mother     Prostate cancer Father     Skin cancer Brother        Social History     Occupational History    Not on file   Tobacco Use    Smoking status: Every Day     Current packs/day: 1.00     Average packs/day: 1 pack/day for 35.0 years (35.0 ttl pk-yrs)     Types: Cigarettes    Smokeless tobacco: Never   Vaping Use     Vaping status: Never Used   Substance and Sexual Activity    Alcohol use: Not Currently     Alcohol/week: 42.0 standard drinks of alcohol     Types: 42 Cans of beer per week     Comment: no alcoholic drinks for 23 months - now non-alcoholic drinks    Drug use: No    Sexual activity: Not Currently     Partners: Female         Current Outpatient Medications:     albuterol (PROVENTIL HFA,VENTOLIN HFA) 90 mcg/act inhaler, Inhale 2 puffs every 6 (six) hours as needed for wheezing, Disp: 9 g, Rfl: 0    calcium carbonate (OS-KORI) 600 MG tablet, Take 1 tablet (600 mg total) by mouth daily, Disp: 90 tablet, Rfl: 3    Cholecalciferol 25 MCG (1000 UT) tablet, Take 1 tablet (1,000 Units total) by mouth daily, Disp: 90 tablet, Rfl: 2    cyanocobalamin (VITAMIN B-12) 100 mcg tablet, Take 1 tablet (100 mcg total) by mouth daily, Disp: 90 tablet, Rfl: 2    Diclofenac Sodium (VOLTAREN) 1 %, Apply 2 g topically 4 (four) times a day (Patient taking differently: Apply 2 g topically if needed), Disp: 350 g, Rfl: 2    doxepin (SINEquan) 50 mg capsule, Take 1 capsule (50 mg total) by mouth daily at bedtime, Disp: 90 capsule, Rfl: 2    folic acid (FOLVITE) 1 mg tablet, Take 1 tablet (1,000 mcg total) by mouth daily, Disp: 90 tablet, Rfl: 1    levETIRAcetam (KEPPRA) 750 mg tablet, Take 1 tablet (750 mg total) by mouth every 12 (twelve) hours, Disp: 180 tablet, Rfl: 2    lisinopril (ZESTRIL) 2.5 mg tablet, Take 1 tablet (2.5 mg total) by mouth daily, Disp: 90 tablet, Rfl: 2    Multiple Vitamin (TAB-A-BONI PO), Take 1 tablet by mouth daily, Disp: , Rfl:     omeprazole (PriLOSEC) 40 MG capsule, Take 1 capsule (40 mg total) by mouth 2 (two) times a day, Disp: 180 capsule, Rfl: 1    pregabalin (LYRICA) 100 mg capsule, Take 1 capsule (100 mg total) by mouth 3 (three) times a day, Disp: 90 capsule, Rfl: 2    sodium chloride 1 g tablet, Take 1 tablet (1 g total) by mouth 3 (three) times a day, Disp: 240 tablet, Rfl: 0    thiamine (VITAMIN B1) 100  "mg tablet, Take 1 tablet (100 mg total) by mouth daily, Disp: 90 tablet, Rfl: 1    umeclidinium-vilanterol (Anoro Ellipta) 62.5-25 mcg/actuation inhaler, Inhale 1 puff daily, Disp: 180 blister, Rfl: 2    No Known Allergies    Physical Exam:    /87   Pulse 88   Resp 16   Ht 5' 6\" (1.676 m)   Wt 63 kg (139 lb)   BMI 22.44 kg/m²     Constitutional:normal, well developed, well nourished, alert, in no distress and non-toxic and no overt pain behavior.  Eyes:anicteric  HEENT:grossly intact  Neck:supple, symmetric, trachea midline and no masses   Pulmonary:even and unlabored  Cardiovascular:No edema or pitting edema present  Skin:Normal without rashes or lesions and well hydrated  Psychiatric:Mood and affect appropriate  Neurologic:Cranial Nerves II-XII grossly intact  Musculoskeletal: Gait is slow and guarded.  Range of motion of the lumbar spine is limited in all planes.    Imaging      Study Result    Narrative & Impression   LUMBAR SPINE     INDICATION:   R52: Pain, unspecified.     COMPARISON: 5/9/2023     VIEWS:  XR SPINE LUMBAR 2 OR 3 VIEWS INJURY        FINDINGS:     There are 5 non rib bearing lumbar vertebral bodies.     Mild L3 compression deformity.     Alignment is unremarkable without dynamic instability.     Age-appropriate lumbar degenerative changes are seen.     The pedicles appear intact.     There are atherosclerotic calcifications. Soft tissues are otherwise unremarkable.     IMPRESSION:  Mild L3 compression deformity. Correlate with clinical exam. Degenerative changes as described.     The study was marked in EPIC for significant notification.     Workstation performed: [id number]         No orders to display       Orders Placed This Encounter   Procedures    Ambulatory Referral to Physical Therapy      "

## 2024-05-15 DIAGNOSIS — E87.1 HYPONATREMIA: Primary | ICD-10-CM

## 2024-06-06 DIAGNOSIS — K21.00 GASTROESOPHAGEAL REFLUX DISEASE WITH ESOPHAGITIS WITHOUT HEMORRHAGE: ICD-10-CM

## 2024-06-06 DIAGNOSIS — E53.8 FOLIC ACID DEFICIENCY: ICD-10-CM

## 2024-06-06 RX ORDER — FOLIC ACID 1 MG/1
1000 TABLET ORAL DAILY
Qty: 90 TABLET | Refills: 0 | Status: SHIPPED | OUTPATIENT
Start: 2024-06-06

## 2024-06-06 RX ORDER — OMEPRAZOLE 40 MG/1
40 CAPSULE, DELAYED RELEASE ORAL 2 TIMES DAILY
Qty: 180 CAPSULE | Refills: 0 | Status: SHIPPED | OUTPATIENT
Start: 2024-06-06

## 2024-06-11 DIAGNOSIS — E87.1 HYPONATREMIA: ICD-10-CM

## 2024-06-11 RX ORDER — SODIUM CHLORIDE 1 G/1
1 TABLET ORAL 3 TIMES DAILY
Qty: 240 TABLET | Refills: 1 | Status: SHIPPED | OUTPATIENT
Start: 2024-06-11 | End: 2024-06-20 | Stop reason: SDUPTHER

## 2024-06-11 NOTE — TELEPHONE ENCOUNTER
Pt understands that it may take up to 72 hrs for script to be sent to pharmacy and he said he has enough for a few days

## 2024-06-16 DIAGNOSIS — K21.00 GASTROESOPHAGEAL REFLUX DISEASE WITH ESOPHAGITIS WITHOUT HEMORRHAGE: ICD-10-CM

## 2024-06-16 RX ORDER — OMEPRAZOLE 40 MG/1
40 CAPSULE, DELAYED RELEASE ORAL 2 TIMES DAILY
Qty: 180 CAPSULE | Refills: 0 | Status: CANCELLED | OUTPATIENT
Start: 2024-06-16

## 2024-06-16 NOTE — TELEPHONE ENCOUNTER
Medication: omeprazole (PriLOSEC)    Dose/Frequency: 40 mg / Take 1 capsule by mouth twice daily     Quantity: 180 capsule    Pharmacy: [facility] - [address] [place] [address], [address] [address]  Phone: [phone]  Fax: [phone]    Office:   [x] PCP/Provider -   [] Speciality/Provider -     Does the patient have enough for 3 days?   [x] Yes   [] No - Send as HP to POD

## 2024-06-17 DIAGNOSIS — K21.00 GASTROESOPHAGEAL REFLUX DISEASE WITH ESOPHAGITIS WITHOUT HEMORRHAGE: ICD-10-CM

## 2024-06-17 RX ORDER — OMEPRAZOLE 40 MG/1
40 CAPSULE, DELAYED RELEASE ORAL 2 TIMES DAILY
Qty: 180 CAPSULE | Refills: 0 | Status: SHIPPED | OUTPATIENT
Start: 2024-06-17

## 2024-06-20 ENCOUNTER — TELEPHONE (OUTPATIENT)
Age: 59
End: 2024-06-20

## 2024-06-20 DIAGNOSIS — E87.1 HYPONATREMIA: ICD-10-CM

## 2024-06-20 RX ORDER — SODIUM CHLORIDE 1 G/1
2 TABLET ORAL 3 TIMES DAILY
Qty: 240 TABLET | Refills: 0 | Status: SHIPPED | OUTPATIENT
Start: 2024-06-20

## 2024-06-20 NOTE — TELEPHONE ENCOUNTER
Patient called- he tried to pick his Sodium Chloride up from [facility], but the script says to take 1 pill 3x/day. He has been taking 2 pills 3x/day. Can the script be changed and resent? Please advise.

## 2024-06-24 NOTE — TELEPHONE ENCOUNTER
Called and spoke to pt he is aware of new script being sent to pharmacy. Pt also stated to getting lab work done this week.

## 2024-06-26 DIAGNOSIS — J44.1 CHRONIC OBSTRUCTIVE PULMONARY DISEASE WITH ACUTE EXACERBATION (HCC): ICD-10-CM

## 2024-06-26 RX ORDER — ALBUTEROL SULFATE 90 UG/1
2 AEROSOL, METERED RESPIRATORY (INHALATION) EVERY 6 HOURS PRN
Qty: 9 G | Refills: 0 | Status: SHIPPED | OUTPATIENT
Start: 2024-06-26

## 2024-06-29 ENCOUNTER — APPOINTMENT (OUTPATIENT)
Dept: LAB | Facility: MEDICAL CENTER | Age: 59
End: 2024-06-29
Payer: COMMERCIAL

## 2024-06-29 DIAGNOSIS — E87.1 HYPONATREMIA: ICD-10-CM

## 2024-06-29 LAB
ANION GAP SERPL CALCULATED.3IONS-SCNC: 10 MMOL/L (ref 4–13)
BUN SERPL-MCNC: 5 MG/DL (ref 5–25)
CALCIUM SERPL-MCNC: 8.6 MG/DL (ref 8.4–10.2)
CHLORIDE SERPL-SCNC: 90 MMOL/L (ref 96–108)
CO2 SERPL-SCNC: 25 MMOL/L (ref 21–32)
CREAT SERPL-MCNC: 0.64 MG/DL (ref 0.6–1.3)
GFR SERPL CREATININE-BSD FRML MDRD: 108 ML/MIN/1.73SQ M
GLUCOSE P FAST SERPL-MCNC: 86 MG/DL (ref 65–99)
POTASSIUM SERPL-SCNC: 4.6 MMOL/L (ref 3.5–5.3)
SODIUM SERPL-SCNC: 125 MMOL/L (ref 135–147)

## 2024-06-29 PROCEDURE — 36415 COLL VENOUS BLD VENIPUNCTURE: CPT

## 2024-06-29 PROCEDURE — 80048 BASIC METABOLIC PNL TOTAL CA: CPT

## 2024-07-01 ENCOUNTER — TELEPHONE (OUTPATIENT)
Dept: NEPHROLOGY | Facility: CLINIC | Age: 59
End: 2024-07-01

## 2024-07-01 NOTE — TELEPHONE ENCOUNTER
----- Message from LORA Cueva sent at 7/1/2024  8:49 AM EDT -----  Please let Cristofer know his sodium is too low. If he is having any symptoms like feeling unwell, confused, dizzy, not himself, falls then he needs to go to ER  Otherwise, He needs to reduce his fluid intake by 12-16 ounces per day   Is he still taking sodium chloride tablets?  Repeat BMP in 2 days - please place verbal order

## 2024-07-03 NOTE — TELEPHONE ENCOUNTER
Patient calling calling back, informed him of provider's message, he said that he feels great, no symptoms. He will repeat the lab on Friday. He is taking the sodium tabs 1 g-2 tabs TID as per med list. He states that he will be travelling in a few weeks and asks if a refill can be sent to [facility] on file.

## 2024-07-05 DIAGNOSIS — E87.1 HYPONATREMIA: ICD-10-CM

## 2024-07-05 DIAGNOSIS — F10.11 HISTORY OF ALCOHOL ABUSE: ICD-10-CM

## 2024-07-05 RX ORDER — MULTIVIT-MIN/IRON/FOLIC ACID/K 18-600-40
1 CAPSULE ORAL DAILY
Qty: 90 TABLET | Refills: 3 | Status: SHIPPED | OUTPATIENT
Start: 2024-07-05

## 2024-07-05 RX ORDER — SODIUM CHLORIDE 1 G/1
2 TABLET ORAL 3 TIMES DAILY
Qty: 240 TABLET | Refills: 0 | Status: SHIPPED | OUTPATIENT
Start: 2024-07-05

## 2024-07-05 NOTE — TELEPHONE ENCOUNTER
Called and spoke to pt he is aware of refill being sent it. I did advised to get BMP done and that it has been placed.

## 2024-07-06 ENCOUNTER — APPOINTMENT (OUTPATIENT)
Dept: LAB | Facility: MEDICAL CENTER | Age: 59
End: 2024-07-06
Payer: COMMERCIAL

## 2024-07-06 DIAGNOSIS — M80.00XS OSTEOPOROSIS WITH CURRENT PATHOLOGICAL FRACTURE, UNSPECIFIED OSTEOPOROSIS TYPE, SEQUELA: ICD-10-CM

## 2024-07-06 DIAGNOSIS — E87.1 HYPONATREMIA: ICD-10-CM

## 2024-07-06 LAB
ALBUMIN SERPL BCG-MCNC: 4.2 G/DL (ref 3.5–5)
ALP SERPL-CCNC: 94 U/L (ref 34–104)
ALT SERPL W P-5'-P-CCNC: 41 U/L (ref 7–52)
ANION GAP SERPL CALCULATED.3IONS-SCNC: 9 MMOL/L (ref 4–13)
AST SERPL W P-5'-P-CCNC: 102 U/L (ref 13–39)
BILIRUB SERPL-MCNC: 0.56 MG/DL (ref 0.2–1)
BUN SERPL-MCNC: 7 MG/DL (ref 5–25)
CALCIUM SERPL-MCNC: 9.3 MG/DL (ref 8.4–10.2)
CHLORIDE SERPL-SCNC: 91 MMOL/L (ref 96–108)
CO2 SERPL-SCNC: 25 MMOL/L (ref 21–32)
CREAT SERPL-MCNC: 0.68 MG/DL (ref 0.6–1.3)
GFR SERPL CREATININE-BSD FRML MDRD: 106 ML/MIN/1.73SQ M
GLUCOSE P FAST SERPL-MCNC: 99 MG/DL (ref 65–99)
POTASSIUM SERPL-SCNC: 4.6 MMOL/L (ref 3.5–5.3)
PROT SERPL-MCNC: 7.7 G/DL (ref 6.4–8.4)
SODIUM SERPL-SCNC: 125 MMOL/L (ref 135–147)

## 2024-07-06 PROCEDURE — 36415 COLL VENOUS BLD VENIPUNCTURE: CPT

## 2024-07-06 PROCEDURE — 80053 COMPREHEN METABOLIC PANEL: CPT

## 2024-07-08 DIAGNOSIS — E87.1 HYPONATREMIA: Primary | ICD-10-CM

## 2024-07-08 RX ORDER — TORSEMIDE 5 MG/1
5 TABLET ORAL DAILY
Qty: 30 TABLET | Refills: 1 | Status: SHIPPED | OUTPATIENT
Start: 2024-07-08 | End: 2024-07-08 | Stop reason: SDUPTHER

## 2024-07-08 RX ORDER — TORSEMIDE 5 MG/1
5 TABLET ORAL EVERY OTHER DAY
Qty: 30 TABLET | Refills: 0 | Status: SHIPPED | OUTPATIENT
Start: 2024-07-08 | End: 2024-07-09 | Stop reason: SDUPTHER

## 2024-07-08 NOTE — PROGRESS NOTES
Persistent hyponatremia despite salt tabs, recommend torsemide to help with free water excretion every other day. Check bmp in 3-4 days.

## 2024-07-09 ENCOUNTER — TELEPHONE (OUTPATIENT)
Dept: NEPHROLOGY | Facility: CLINIC | Age: 59
End: 2024-07-09

## 2024-07-09 DIAGNOSIS — E87.1 HYPONATREMIA: ICD-10-CM

## 2024-07-09 RX ORDER — TORSEMIDE 10 MG/1
10 TABLET ORAL DAILY
Qty: 90 TABLET | Refills: 1 | Status: SHIPPED | OUTPATIENT
Start: 2024-07-09

## 2024-07-09 NOTE — TELEPHONE ENCOUNTER
"Pt returning call, I read him 's note \"Persistent hyponatremia despite salt tabs, recommend torsemide to help with free water excretion every other day. Check bmp in 3-4 days. \"  "

## 2024-07-09 NOTE — TELEPHONE ENCOUNTER
"Called and spoke to pt. He is aware of Elizabeth's message \"Torsemide was already on his med list so I changed it to 10 mg daily and sent to pharmacy.   Repeat bmp by Thursday   All orders placed.\" He said he received a message from pharmacy and will be picking med's up tomorrow.  "

## 2024-07-09 NOTE — TELEPHONE ENCOUNTER
----- Message from LORA Cueva sent at 7/9/2024 12:27 PM EDT -----  Torsemide was already on his med list so I changed it to 10 mg daily and sent to pharmacy.   Repeat bmp by Thursday  All orders placed

## 2024-07-18 DIAGNOSIS — F10.11 HISTORY OF ALCOHOL ABUSE: ICD-10-CM

## 2024-07-18 RX ORDER — UBIDECARENONE 75 MG
100 CAPSULE ORAL DAILY
Qty: 90 TABLET | Refills: 0 | Status: SHIPPED | OUTPATIENT
Start: 2024-07-18

## 2024-08-12 NOTE — ASSESSMENT & PLAN NOTE
· Presented to the emergency department at [address] acutely intoxicated      · Found to have hemoglobin of 8 1 - decreased from 13 5 as of January 2019  · Transfer requested for gastroenterology consultation  · Likely has been chronic over the last several months as MCV is microcytic  · Heme negative in the ED - no active signs of bleeding currently  · Check occult blood stool, iron panel  · Continue to monitor hemoglobin 149

## 2024-08-13 ENCOUNTER — OFFICE VISIT (OUTPATIENT)
Dept: PAIN MEDICINE | Facility: CLINIC | Age: 59
End: 2024-08-13
Payer: COMMERCIAL

## 2024-08-13 ENCOUNTER — APPOINTMENT (OUTPATIENT)
Dept: LAB | Facility: MEDICAL CENTER | Age: 59
End: 2024-08-13
Payer: COMMERCIAL

## 2024-08-13 VITALS
TEMPERATURE: 97.9 F | DIASTOLIC BLOOD PRESSURE: 79 MMHG | SYSTOLIC BLOOD PRESSURE: 111 MMHG | BODY MASS INDEX: 20.63 KG/M2 | HEART RATE: 80 BPM | RESPIRATION RATE: 16 BRPM | WEIGHT: 128.4 LBS | HEIGHT: 66 IN

## 2024-08-13 DIAGNOSIS — M51.36 LUMBAR DEGENERATIVE DISC DISEASE: ICD-10-CM

## 2024-08-13 DIAGNOSIS — E87.1 HYPONATREMIA: ICD-10-CM

## 2024-08-13 DIAGNOSIS — G89.4 CHRONIC PAIN SYNDROME: Primary | ICD-10-CM

## 2024-08-13 DIAGNOSIS — M48.061 SPINAL STENOSIS OF LUMBAR REGION, UNSPECIFIED WHETHER NEUROGENIC CLAUDICATION PRESENT: ICD-10-CM

## 2024-08-13 DIAGNOSIS — M47.816 LUMBAR SPONDYLOSIS: ICD-10-CM

## 2024-08-13 LAB
ANION GAP SERPL CALCULATED.3IONS-SCNC: 11 MMOL/L (ref 4–13)
BUN SERPL-MCNC: 7 MG/DL (ref 5–25)
CALCIUM SERPL-MCNC: 9.3 MG/DL (ref 8.4–10.2)
CHLORIDE SERPL-SCNC: 87 MMOL/L (ref 96–108)
CO2 SERPL-SCNC: 31 MMOL/L (ref 21–32)
CREAT SERPL-MCNC: 0.72 MG/DL (ref 0.6–1.3)
GFR SERPL CREATININE-BSD FRML MDRD: 102 ML/MIN/1.73SQ M
GLUCOSE P FAST SERPL-MCNC: 153 MG/DL (ref 65–99)
POTASSIUM SERPL-SCNC: 4 MMOL/L (ref 3.5–5.3)
SODIUM SERPL-SCNC: 129 MMOL/L (ref 135–147)

## 2024-08-13 PROCEDURE — 99214 OFFICE O/P EST MOD 30 MIN: CPT | Performed by: NURSE PRACTITIONER

## 2024-08-13 PROCEDURE — 80048 BASIC METABOLIC PNL TOTAL CA: CPT

## 2024-08-13 RX ORDER — PREGABALIN 100 MG/1
100 CAPSULE ORAL 3 TIMES DAILY
Qty: 90 CAPSULE | Refills: 2 | Status: SHIPPED | OUTPATIENT
Start: 2024-08-13 | End: 2024-11-11

## 2024-08-13 NOTE — PROGRESS NOTES
Assessment:  1. Chronic pain syndrome    2. Lumbar spondylosis    3. Lumbar degenerative disc disease    4. Spinal stenosis of lumbar region, unspecified whether neurogenic claudication present        Plan:  While the patient was in the office today, I did have a thorough conversation regarding their chronic pain syndrome, medication management, and treatment plan options.  Patient is being seen for a follow-up visit.  Overall, he reports that his pain remains pretty minimal following bilateral L4-5 and L5-S1 radiofrequency ablations.  Right side was performed on 4/26/2024.  The left side was performed on 4/3/2024.  He still maintaining more than 50% improvement in his symptoms.    Last office visit, patient was referred to physical therapy for lumbar core strengthening/stretching.  Patient has not started physical therapy, but would like to start.  I provided him with another referral for physical therapy.    Renewed Lyrica 100 mg 3 times daily.  A prescription was sent to his pharmacy with 2 refills.    Follow-up in 3 months.  Call sooner if pain increases.      History of Present Illness:  The patient is a 59 y.o. male who presents for a follow up office visit in regards to No chief complaint on file..   The patient’s current symptoms include complaints of low back pain.  Current pain level is a 4/10.  Quality pain is described as sharp and shooting.    Current pain medications includes: Lyrica 100 mg 3 times daily.  The patient reports that this regimen is providing 25-30% pain relief.  The patient is reporting no side effects from this pain medication regimen.    I have personally reviewed and/or updated the patient's past medical history, past surgical history, family history, social history, current medications, allergies, and vital signs today.         Review of Systems  Review of Systems   All other systems reviewed and are negative.          Past Medical History:   Diagnosis Date    Alcohol abuse      Traore esophagus     Bowel obstruction (HCC)     Bowel perforation (HCC)     Cardiac disease     Continuous chronic alcoholism (HCC) 10/5/2017    COPD (chronic obstructive pulmonary disease) (HCC)     History of shoulder surgery     Right shoulder    History of transfusion     Hx of cervical spine surgery     Hypertension     Incisional hernia 10/8/2017    MI, old     Mitral regurgitation     Psychiatric disorder     Seizures (HCC)        Past Surgical History:   Procedure Laterality Date    APPENDECTOMY      BACK SURGERY      CHOLECYSTECTOMY      ESOPHAGOGASTRODUODENOSCOPY N/A 11/28/2016    Procedure: ESOPHAGOGASTRODUODENOSCOPY (EGD);  Surgeon: Darryl Monroy MD;  Location: [facility];  Service:     GALLBLADDER SURGERY      LAPAROTOMY N/A 10/25/2016    Procedure: LAPAROTOMY EXPLORATORY;  Surgeon: Renzo Harper MD;  Location: [facility];  Service:     MOUTH SURGERY      NERVE BLOCK Bilateral 2/15/2024    Procedure: Bilateral L4-5 and L5-S1 medial branch block #1;  Surgeon: Jonathan Garcia MD;  Location: [facility];  Service: Pain Management     NERVE BLOCK Bilateral 3/7/2024    Procedure: BLOCK MEDIAL BRANCH B/L L4-5 and L5-S1 MBB#2;  Surgeon: Jonathan Garcia MD;  Location: [facility];  Service: Pain Management     PERCUTANEOUS PINNING FEMORAL NECK FRACTURE      RADIOFREQUENCY ABLATION Left 4/3/2024    Procedure: Left L4-L5 and L5-S1 radiofrequency ablation;  Surgeon: Jonathan Garcia MD;  Location: [facility];  Service: Pain Management     RADIOFREQUENCY ABLATION Right 4/26/2024    Procedure: Right L4-L5 and L5-S1 radiofrequency ablation;  Surgeon: Jonathan Garcia MD;  Location: [facility];  Service: Pain Management     SHOULDER SURGERY Right     SHOULDER SURGERY      SMALL INTESTINE SURGERY      STOMACH SURGERY      bal surgery       Family History   Problem Relation Age of Onset    Breast cancer Mother     Prostate cancer Father     Skin cancer Brother        Social History      Occupational History    Not on file   Tobacco Use    Smoking status: Every Day     Current packs/day: 1.00     Average packs/day: 1 pack/day for 35.0 years (35.0 ttl pk-yrs)     Types: Cigarettes    Smokeless tobacco: Never   Vaping Use    Vaping status: Never Used   Substance and Sexual Activity    Alcohol use: Not Currently     Alcohol/week: 42.0 standard drinks of alcohol     Types: 42 Cans of beer per week     Comment: no alcoholic drinks for 23 months - now non-alcoholic drinks    Drug use: No    Sexual activity: Not Currently     Partners: Female         Current Outpatient Medications:     albuterol (PROVENTIL HFA,VENTOLIN HFA) 90 mcg/act inhaler, Inhale 2 puffs every 6 (six) hours as needed for wheezing, Disp: 9 g, Rfl: 0    calcium carbonate (OS-KORI) 600 MG tablet, Take 1 tablet (600 mg total) by mouth daily, Disp: 90 tablet, Rfl: 3    cyanocobalamin (VITAMIN B-12) 100 mcg tablet, Take 1 tablet by mouth once daily, Disp: 90 tablet, Rfl: 0    Diclofenac Sodium (VOLTAREN) 1 %, Apply 2 g topically 4 (four) times a day (Patient taking differently: Apply 2 g topically if needed), Disp: 350 g, Rfl: 2    doxepin (SINEquan) 50 mg capsule, Take 1 capsule (50 mg total) by mouth daily at bedtime, Disp: 90 capsule, Rfl: 2    folic acid (FOLVITE) 1 mg tablet, Take 1 tablet by mouth once daily, Disp: 90 tablet, Rfl: 0    levETIRAcetam (KEPPRA) 750 mg tablet, Take 1 tablet (750 mg total) by mouth every 12 (twelve) hours, Disp: 180 tablet, Rfl: 2    lisinopril (ZESTRIL) 2.5 mg tablet, Take 1 tablet (2.5 mg total) by mouth daily, Disp: 90 tablet, Rfl: 2    Multiple Vitamin (TAB-A-BONI PO), Take 1 tablet by mouth daily, Disp: , Rfl:     omeprazole (PriLOSEC) 40 MG capsule, Take 1 capsule (40 mg total) by mouth 2 (two) times a day, Disp: 180 capsule, Rfl: 0    pregabalin (LYRICA) 100 mg capsule, Take 1 capsule (100 mg total) by mouth 3 (three) times a day, Disp: 90 capsule, Rfl: 2    sodium chloride 1 g tablet, Take 2  "tablets (2 g total) by mouth 3 (three) times a day, Disp: 240 tablet, Rfl: 0    thiamine (VITAMIN B1) 100 mg tablet, Take 1 tablet (100 mg total) by mouth daily, Disp: 90 tablet, Rfl: 1    torsemide (DEMADEX) 10 mg tablet, Take 1 tablet (10 mg total) by mouth daily, Disp: 90 tablet, Rfl: 1    umeclidinium-vilanterol (Anoro Ellipta) 62.5-25 mcg/actuation inhaler, Inhale 1 puff daily, Disp: 180 blister, Rfl: 2    Vitamin D, Cholecalciferol, 25 MCG (1000 UT) TABS, Take 1 tablet by mouth once daily, Disp: 90 tablet, Rfl: 3    No Known Allergies    Physical Exam:    /79   Pulse 80   Temp 97.9 °F (36.6 °C) (Temporal)   Resp 16   Ht 5' 6\" (1.676 m)   Wt 58.2 kg (128 lb 6.4 oz)   BMI 20.72 kg/m²     Constitutional:normal, well developed, well nourished, alert, in no distress and non-toxic and no overt pain behavior.  Eyes:anicteric  HEENT:grossly intact  Neck:supple, symmetric, trachea midline and no masses   Pulmonary:even and unlabored  Cardiovascular:No edema or pitting edema present  Skin:Normal without rashes or lesions and well hydrated  Psychiatric:Mood and affect appropriate  Neurologic:Cranial Nerves II-XII grossly intact  Musculoskeletal:normal    Imaging  Narrative & Impression   A spine and pain study was performed by the Department of Spine and Pain.   Please refer to the report for the official interpretation.   The images are stored for archival purposes only.   Study images were not formally reviewed by the Radiology Department.       No orders to display       Orders Placed This Encounter   Procedures    Ambulatory Referral to Physical Therapy      "

## 2024-08-14 ENCOUNTER — TELEPHONE (OUTPATIENT)
Dept: NEPHROLOGY | Facility: CLINIC | Age: 59
End: 2024-08-14

## 2024-08-14 DIAGNOSIS — M80.00XS OSTEOPOROSIS WITH CURRENT PATHOLOGICAL FRACTURE, UNSPECIFIED OSTEOPOROSIS TYPE, SEQUELA: ICD-10-CM

## 2024-08-14 RX ORDER — PHENOL 1.4 %
600 AEROSOL, SPRAY (ML) MUCOUS MEMBRANE DAILY
Qty: 100 TABLET | Refills: 3 | Status: SHIPPED | OUTPATIENT
Start: 2024-08-14

## 2024-08-14 NOTE — TELEPHONE ENCOUNTER
I called and spoke with Cristofer regarding the following:    ----- Message from LORA Cueva sent at 8/14/2024 12:07 PM EDT -----  Serum sodium is improved to 129  Continue current fluid restriction and repeat bmp in 1 month    Cristofer is aware of lab results. He is aware to repeat labs in 1 month.

## 2024-08-20 ENCOUNTER — APPOINTMENT (OUTPATIENT)
Dept: LAB | Facility: MEDICAL CENTER | Age: 59
End: 2024-08-20
Payer: COMMERCIAL

## 2024-08-20 ENCOUNTER — OFFICE VISIT (OUTPATIENT)
Dept: ENDOCRINOLOGY | Facility: CLINIC | Age: 59
End: 2024-08-20
Payer: COMMERCIAL

## 2024-08-20 VITALS
WEIGHT: 128.6 LBS | DIASTOLIC BLOOD PRESSURE: 82 MMHG | TEMPERATURE: 97.6 F | BODY MASS INDEX: 20.67 KG/M2 | HEIGHT: 66 IN | SYSTOLIC BLOOD PRESSURE: 140 MMHG

## 2024-08-20 DIAGNOSIS — M80.00XS OSTEOPOROSIS WITH CURRENT PATHOLOGICAL FRACTURE, UNSPECIFIED OSTEOPOROSIS TYPE, SEQUELA: Primary | ICD-10-CM

## 2024-08-20 DIAGNOSIS — E87.1 HYPONATREMIA: ICD-10-CM

## 2024-08-20 LAB
25(OH)D3 SERPL-MCNC: 67.2 NG/ML (ref 30–100)
ALBUMIN SERPL BCG-MCNC: 4.5 G/DL (ref 3.5–5)
ALP SERPL-CCNC: 87 U/L (ref 34–104)
ALT SERPL W P-5'-P-CCNC: 24 U/L (ref 7–52)
ANION GAP SERPL CALCULATED.3IONS-SCNC: 10 MMOL/L (ref 4–13)
AST SERPL W P-5'-P-CCNC: 61 U/L (ref 13–39)
BILIRUB SERPL-MCNC: 0.52 MG/DL (ref 0.2–1)
BUN SERPL-MCNC: 5 MG/DL (ref 5–25)
CALCIUM SERPL-MCNC: 9.3 MG/DL (ref 8.4–10.2)
CHLORIDE SERPL-SCNC: 91 MMOL/L (ref 96–108)
CO2 SERPL-SCNC: 29 MMOL/L (ref 21–32)
CREAT SERPL-MCNC: 0.61 MG/DL (ref 0.6–1.3)
GFR SERPL CREATININE-BSD FRML MDRD: 110 ML/MIN/1.73SQ M
GLUCOSE P FAST SERPL-MCNC: 112 MG/DL (ref 65–99)
PHOSPHATE SERPL-MCNC: 2.3 MG/DL (ref 2.7–4.5)
POTASSIUM SERPL-SCNC: 4.1 MMOL/L (ref 3.5–5.3)
PROT SERPL-MCNC: 7.7 G/DL (ref 6.4–8.4)
PTH-INTACT SERPL-MCNC: 33.7 PG/ML (ref 12–88)
SODIUM SERPL-SCNC: 130 MMOL/L (ref 135–147)

## 2024-08-20 PROCEDURE — 82523 COLLAGEN CROSSLINKS: CPT | Performed by: STUDENT IN AN ORGANIZED HEALTH CARE EDUCATION/TRAINING PROGRAM

## 2024-08-20 PROCEDURE — 82306 VITAMIN D 25 HYDROXY: CPT | Performed by: STUDENT IN AN ORGANIZED HEALTH CARE EDUCATION/TRAINING PROGRAM

## 2024-08-20 PROCEDURE — 99214 OFFICE O/P EST MOD 30 MIN: CPT | Performed by: STUDENT IN AN ORGANIZED HEALTH CARE EDUCATION/TRAINING PROGRAM

## 2024-08-20 PROCEDURE — 84100 ASSAY OF PHOSPHORUS: CPT | Performed by: STUDENT IN AN ORGANIZED HEALTH CARE EDUCATION/TRAINING PROGRAM

## 2024-08-20 PROCEDURE — 83970 ASSAY OF PARATHORMONE: CPT | Performed by: STUDENT IN AN ORGANIZED HEALTH CARE EDUCATION/TRAINING PROGRAM

## 2024-08-20 PROCEDURE — 36415 COLL VENOUS BLD VENIPUNCTURE: CPT | Performed by: STUDENT IN AN ORGANIZED HEALTH CARE EDUCATION/TRAINING PROGRAM

## 2024-08-20 PROCEDURE — 80053 COMPREHEN METABOLIC PANEL: CPT | Performed by: STUDENT IN AN ORGANIZED HEALTH CARE EDUCATION/TRAINING PROGRAM

## 2024-08-20 NOTE — PROGRESS NOTES
Ambulatory Visit  Name: Cristofer Wiley      : 1965      MRN: [id number]  Encounter Provider: Migel Marcelo DO  Encounter Date: 2024   Encounter department: [facility]    Assessment & Plan   1. Osteoporosis with current pathological fracture, unspecified osteoporosis type, sequela  Assessment & Plan:  Cristofer is anticipating treatment #3 with reclast this October. Given very high risk for fractures with hx sacral insufficiency fracture, anticipate up to 6-year treatment course. Will monitor DXA in 2-mo. Labs at present. I will contact patient with results and additional recommendations. Recommend follow up in 12-mo.   Orders:  -     DXA bone density spine hip and pelvis; Future; Expected date: 10/04/2024  -     Vitamin D 25 hydroxy  -     C-Telopeptide  -     Calcium, urine, random  -     Comprehensive metabolic panel  -     Creatinine, urine, random  -     Phosphorus  -     PTH, intact  2. Hyponatremia  Assessment & Plan:  This is an independent risk factor for falls. Should monitor    RTC 12-mo    History of Present Illness     Cristofer Wiley is a 59 y.o. male who presents in follow up of osteoporosis. Patinet is on reclast. He is due for treatment 10/2024. He received reclast initially 10/13/2022 and received 2nd dose 10/17/2023. He is scheduled for upcoming treatment 10/18/24.     Has history of fractures, including sacral insufficiency fracture. He has been participating in physical therapy. He has had no falls. His balance is good. He is taking calcium supplementation daily and Vit D supplementation. He is on PPI. Balance is okay. No falls. There is history of cirrhosis. There is history of hyponatremia.     Review of Systems   Constitutional:  Negative for diaphoresis and unexpected weight change.   Gastrointestinal:  Negative for nausea and vomiting.   Neurological:  Negative for dizziness.   All other systems reviewed and are negative.      Objective  "    /82   Temp 97.6 °F (36.4 °C)   Ht 5' 6\" (1.676 m)   Wt 58.3 kg (128 lb 9.6 oz)   BMI 20.76 kg/m²     Physical Exam  Vitals reviewed.   Constitutional:       General: He is not in acute distress.     Appearance: He is ill-appearing. He is not toxic-appearing or diaphoretic.   HENT:      Nose: Nose normal.   Eyes:      General: No scleral icterus.     Conjunctiva/sclera: Conjunctivae normal.   Pulmonary:      Effort: Pulmonary effort is normal. No respiratory distress.   Musculoskeletal:      Cervical back: Normal range of motion.      Comments: Increased thoracic kyphosis, decreased lumbar lordosis   Skin:     General: Skin is warm and dry.   Neurological:      Mental Status: He is alert.   Psychiatric:         Mood and Affect: Mood normal.         Behavior: Behavior normal. Behavior is cooperative.       Lab Results   Component Value Date     12/31/2015    SODIUM 129 (L) 08/13/2024    K 4.0 08/13/2024    CL 87 (L) 08/13/2024    CO2 31 08/13/2024    ANIONGAP 11 12/31/2015    AGAP 11 08/13/2024    BUN 7 08/13/2024    CREATININE 0.72 08/13/2024    GLUC 93 03/01/2023    GLUF 153 (H) 08/13/2024    CALCIUM 9.3 08/13/2024     (H) 07/06/2024    ALT 41 07/06/2024    ALKPHOS 94 07/06/2024    PROT 7.1 12/31/2015    TP 7.7 07/06/2024    BILITOT 0.47 12/31/2015    TBILI 0.56 07/06/2024    EGFR 102 08/13/2024     Lab Results   Component Value Date    PTH 28.6 01/30/2023    CALCIUM 9.3 08/13/2024    PHOS 3.0 02/27/2023         DXA SCAN     CLINICAL HISTORY:  57-year-old male with history of multiple thoracic compression fractures resulting from minor injury.   OTHER RISK FACTORS:  History of fractures resulting from minor injury.  Current cigarette smoker.  3 or more alcoholic beverages per day (until June of this year).  Cirrhosis.  COPD..     PHARMACOLOGIC THERAPY FOR OSTEOPOROSIS:  None.     TECHNIQUE: Bone densitometry was performed using a [employer] Discovery A  bone densitometer.  Regions of interest " appear properly placed.         COMPARISON: There are no prior DXA studies performed on this unit for comparison.     RESULTS:      LUMBAR SPINE: Not assessed because  generalized spondylosis results in fewer than two evaluable vertebrae. .     LEFT  TOTAL HIP:   BMD:  0.547  gm/cm2    T-score:  -3.2     LEFT  FEMORAL NECK:   BMD:  0.532  gm/cm2   T score: -2.9      RIGHT  FOREARM:    33% RADIUS BMD:  0.527  gm/cm2  T-score:  -5.2         IMPRESSION:     1.  Osteoporosis.     3.  The 10 year risk of hip fracture is 14% with the 10 year risk of major osteoporotic fracture being 22% as calculated by the [employer]/WHO fracture risk assessment tool (FRAX).     3.  The current NOF guidelines recommend treating patients with a T-score of -2.5 or less in the lumbar spine or hips, or in post-menopausal women and men over the age of 50 with low bone mass (osteopenia) and a FRAX 10 year risk score of >3% for hip   fracture and/or >20% for major osteoporotic fracture.     4.  The NOF recommends follow-up DXA in 1-2 years after initiating therapy for osteoporosis and every 2 years thereafter. More frequent evaluation is appropriate for patients with conditions associated with rapid bone loss, such as glucocorticoid   therapy. The interval between DXA screenings may be longer for individuals without major risk factors and initial T-score in the normal or upper low bone mass range.        The FRAX algorithm has certain limitations:  -FRAX has not been validated in patients currently or previously treated with pharmacotherapy for osteoporosis.  In such patients, clinical judgment must be exercised in interpreting FRAX scores.    -Prior hip, vertebral and humeral fragility fractures appear to confer greater risk of subsequent fracture than fractures at other sites (this is especially true for individuals with severe vertebral fractures), but quantification of this incremental   risk is not possible with FRAX.  -FRAX  underestimates fracture risk in patients with history of multiple fragility fractures.  -FRAX may underestimate fracture risk in patients with history of frequent falls.  -It is not appropriate to use FRAX to monitor treatment response.       Administrative Statements

## 2024-08-20 NOTE — ASSESSMENT & PLAN NOTE
Cristofer is anticipating treatment #3 with reclast this October. Given very high risk for fractures with hx sacral insufficiency fracture, anticipate up to 6-year treatment course. Will monitor DXA in 2-mo. Labs at present. I will contact patient with results and additional recommendations. Recommend follow up in 12-mo.

## 2024-08-28 LAB — COLLAGEN CTX SERPL-MCNC: 272 PG/ML

## 2024-08-29 DIAGNOSIS — E87.1 HYPONATREMIA: ICD-10-CM

## 2024-08-29 DIAGNOSIS — F51.04 PSYCHOPHYSIOLOGICAL INSOMNIA: ICD-10-CM

## 2024-08-29 DIAGNOSIS — M80.00XS OSTEOPOROSIS WITH CURRENT PATHOLOGICAL FRACTURE, UNSPECIFIED OSTEOPOROSIS TYPE, SEQUELA: Primary | ICD-10-CM

## 2024-08-29 RX ORDER — SODIUM CHLORIDE 1 G/1
2 TABLET ORAL 3 TIMES DAILY
Qty: 240 TABLET | Refills: 0 | Status: SHIPPED | OUTPATIENT
Start: 2024-08-29

## 2024-09-03 DIAGNOSIS — J44.1 CHRONIC OBSTRUCTIVE PULMONARY DISEASE WITH ACUTE EXACERBATION (HCC): ICD-10-CM

## 2024-09-03 DIAGNOSIS — E53.8 FOLIC ACID DEFICIENCY: ICD-10-CM

## 2024-09-03 RX ORDER — FOLIC ACID 1 MG/1
1000 TABLET ORAL DAILY
Qty: 90 TABLET | Refills: 0 | Status: SHIPPED | OUTPATIENT
Start: 2024-09-03

## 2024-09-03 RX ORDER — UMECLIDINIUM BROMIDE AND VILANTEROL TRIFENATATE 62.5; 25 UG/1; UG/1
1 POWDER RESPIRATORY (INHALATION) DAILY
Qty: 180 EACH | Refills: 0 | Status: SHIPPED | OUTPATIENT
Start: 2024-09-03

## 2024-09-09 RX ORDER — DOXEPIN HYDROCHLORIDE 50 MG/1
50 CAPSULE ORAL
Qty: 30 CAPSULE | Refills: 0 | Status: SHIPPED | OUTPATIENT
Start: 2024-09-09

## 2024-09-10 DIAGNOSIS — E87.1 CHRONIC HYPONATREMIA: ICD-10-CM

## 2024-09-10 DIAGNOSIS — E53.8 FOLIC ACID DEFICIENCY: ICD-10-CM

## 2024-09-10 RX ORDER — FOLIC ACID 1 MG/1
1000 TABLET ORAL DAILY
Qty: 90 TABLET | Refills: 0 | OUTPATIENT
Start: 2024-09-10

## 2024-09-10 RX ORDER — LISINOPRIL 2.5 MG/1
2.5 TABLET ORAL DAILY
Qty: 90 TABLET | Refills: 2 | OUTPATIENT
Start: 2024-09-10

## 2024-09-23 ENCOUNTER — TELEPHONE (OUTPATIENT)
Dept: NEPHROLOGY | Facility: CLINIC | Age: 59
End: 2024-09-23

## 2024-09-23 DIAGNOSIS — E87.1 HYPONATREMIA: Primary | ICD-10-CM

## 2024-09-25 DIAGNOSIS — K21.00 GASTROESOPHAGEAL REFLUX DISEASE WITH ESOPHAGITIS WITHOUT HEMORRHAGE: ICD-10-CM

## 2024-09-25 DIAGNOSIS — K70.10 ALCOHOLIC HEPATITIS WITHOUT ASCITES: ICD-10-CM

## 2024-09-26 RX ORDER — THIAMINE MONONITRATE (VIT B1) 100 MG
100 TABLET ORAL DAILY
Qty: 90 TABLET | Refills: 1 | Status: SHIPPED | OUTPATIENT
Start: 2024-09-26

## 2024-09-26 RX ORDER — OMEPRAZOLE 40 MG/1
40 CAPSULE, DELAYED RELEASE ORAL 2 TIMES DAILY
Qty: 180 CAPSULE | Refills: 0 | Status: SHIPPED | OUTPATIENT
Start: 2024-09-26

## 2024-10-07 DIAGNOSIS — F51.04 PSYCHOPHYSIOLOGICAL INSOMNIA: ICD-10-CM

## 2024-10-07 RX ORDER — DOXEPIN HYDROCHLORIDE 50 MG/1
50 CAPSULE ORAL
Qty: 30 CAPSULE | Refills: 0 | Status: SHIPPED | OUTPATIENT
Start: 2024-10-07

## 2024-10-15 ENCOUNTER — OFFICE VISIT (OUTPATIENT)
Dept: NEPHROLOGY | Facility: CLINIC | Age: 59
End: 2024-10-15
Payer: COMMERCIAL

## 2024-10-15 ENCOUNTER — APPOINTMENT (OUTPATIENT)
Dept: RADIOLOGY | Facility: MEDICAL CENTER | Age: 59
End: 2024-10-15
Payer: COMMERCIAL

## 2024-10-15 ENCOUNTER — TELEPHONE (OUTPATIENT)
Dept: NEPHROLOGY | Facility: CLINIC | Age: 59
End: 2024-10-15

## 2024-10-15 VITALS
HEIGHT: 66 IN | DIASTOLIC BLOOD PRESSURE: 80 MMHG | WEIGHT: 126.8 LBS | OXYGEN SATURATION: 96 % | HEART RATE: 84 BPM | BODY MASS INDEX: 20.38 KG/M2 | SYSTOLIC BLOOD PRESSURE: 126 MMHG

## 2024-10-15 DIAGNOSIS — M80.00XS OSTEOPOROSIS WITH CURRENT PATHOLOGICAL FRACTURE, UNSPECIFIED OSTEOPOROSIS TYPE, SEQUELA: ICD-10-CM

## 2024-10-15 DIAGNOSIS — E87.1 HYPONATREMIA: ICD-10-CM

## 2024-10-15 DIAGNOSIS — M84.48XA PATHOLOGICAL FRACTURE OF RIB, INITIAL ENCOUNTER: ICD-10-CM

## 2024-10-15 DIAGNOSIS — M84.48XA PATHOLOGICAL FRACTURE OF RIB, INITIAL ENCOUNTER: Primary | ICD-10-CM

## 2024-10-15 PROCEDURE — 71046 X-RAY EXAM CHEST 2 VIEWS: CPT

## 2024-10-15 PROCEDURE — 99214 OFFICE O/P EST MOD 30 MIN: CPT | Performed by: INTERNAL MEDICINE

## 2024-10-15 RX ORDER — SODIUM CHLORIDE 1 G/1
3 TABLET ORAL 2 TIMES DAILY
Qty: 240 TABLET | Refills: 0 | Status: SHIPPED | OUTPATIENT
Start: 2024-10-15

## 2024-10-15 NOTE — TELEPHONE ENCOUNTER
Please let patient know his xray shows potential acute nondisplaced rib fractures of 3rd and 4th rib. Will reach out to primary for management. Treatment is typically rest, ice and breathing exercises.

## 2024-10-15 NOTE — PATIENT INSTRUCTIONS
Recommend drinking around 40-50 ounces of fluid a day. Continue salt tablets .  Check chest xray ASAP. IF shortness of breath, go to ER.  Follow up blood work every 2 months.

## 2024-10-15 NOTE — PROGRESS NOTES
Assessment & Plan:    1. Pathological fracture of rib, initial encounter  -     XR chest pa and lateral; Future; Expected date: 10/22/2024  2. Hyponatremia  -     sodium chloride 1 g tablet; Take 3 tablets (3 g total) by mouth 2 (two) times a day  -     Comprehensive metabolic panel; Future  -     Comprehensive metabolic panel; Future; Expected date: 12/15/2024  -     Comprehensive metabolic panel; Future; Expected date: 02/15/2025  3. Osteoporosis with current pathological fracture, unspecified osteoporosis type, sequela        Pathologic fracture of rib  Advise if SOB, go to ER. Check CXR chest ASAP, patient going today.   Patient verbalized understanding to instructions.   Patient hemodynamically stable and respiratory exam normal. No bruising noted on ribs, reports mild tenderness to left ribs, maybe a contusion.    2. Hyponatremia  Etiology previously attributed to ADH release from cirrhosis.   Continue bimonthly monitoring given stability. Check labs ASAP.  Na 130 8/20/24.   Volume status compensated.  Current fluid intake 32 ounces per day, can hydrate 40-50 ounces per day.  Fu in 6 months with labs prior.    3. Osteoporosis   Continue DEXA scan and Reclast infusion yearly per endocrine.    The benefits, risks and alternatives to the treatment plan were discussed at this visit. Patient was advised of common adverse effects of any medical therapies prescribed. All questions were answered and discussed with the patient and any accompanying family members or caretakers.      Subjective:      Patient ID: Cristofer Wiley is a 59 y.o. male presenting for follow up in the [place] office for sodium mangement. Patient last seen 4/5/24.     HPI    In the interim since last visit, reports compliance with sodium tablet managemnt- taking 3 gram twice a day. Not taking torsemide.    Does not check BG/BP at home.  Denies falls.     Getting DEXA scan and Reclast infusion under care of endocrinology.    Reports 14 lb weight  "loss. He eats 3-4 times a day. He reports labile weight is not new. He is very active.     Reports rib cracking when friend hugged him. He has some mild shortness of breath after this. He reports hx multiple fractures in the past.      The following portions of the patient's history were reviewed and updated as appropriate: allergies, current medications, past family history, past medical history, past social history, past surgical history, and problem list.    Review of Systems   Respiratory:  Positive for shortness of breath.    Cardiovascular: Negative.    Gastrointestinal: Negative.    Genitourinary: Negative.    Musculoskeletal:  Positive for arthralgias.   Neurological: Negative.    All other systems reviewed and are negative.        Objective:      /80 (BP Location: Right arm, Patient Position: Sitting)   Pulse 84   Ht 5' 6\" (1.676 m)   Wt 57.5 kg (126 lb 12.8 oz)   SpO2 96%   BMI 20.47 kg/m²          Physical Exam  Vitals reviewed.   Constitutional:       General: He is not in acute distress.     Appearance: He is not ill-appearing.   HENT:      Head: Normocephalic and atraumatic.      Nose: Nose normal.      Mouth/Throat:      Mouth: Mucous membranes are moist.      Pharynx: Oropharynx is clear.   Eyes:      Extraocular Movements: Extraocular movements intact.      Conjunctiva/sclera: Conjunctivae normal.   Cardiovascular:      Rate and Rhythm: Normal rate and regular rhythm.      Heart sounds:      No friction rub.   Pulmonary:      Breath sounds: No wheezing, rhonchi or rales.   Abdominal:      Palpations: Abdomen is soft.      Tenderness: There is no abdominal tenderness. There is no guarding.   Musculoskeletal:      Right lower leg: No edema.      Left lower leg: No edema.   Skin:     Coloration: Skin is not jaundiced.      Findings: No bruising or rash.   Neurological:      General: No focal deficit present.      Mental Status: He is alert and oriented to person, place, and time. Mental " "status is at baseline.      Cranial Nerves: No cranial nerve deficit.   Psychiatric:         Mood and Affect: Mood normal.         Behavior: Behavior normal.             Lab Results   Component Value Date     12/31/2015    SODIUM 130 (L) 08/20/2024    K 4.1 08/20/2024    CL 91 (L) 08/20/2024    CO2 29 08/20/2024    ANIONGAP 11 12/31/2015    AGAP 10 08/20/2024    BUN 5 08/20/2024    CREATININE 0.61 08/20/2024    GLUC 93 03/01/2023    GLUF 112 (H) 08/20/2024    CALCIUM 9.3 08/20/2024    AST 61 (H) 08/20/2024    ALT 24 08/20/2024    ALKPHOS 87 08/20/2024    PROT 7.1 12/31/2015    TP 7.7 08/20/2024    BILITOT 0.47 12/31/2015    TBILI 0.52 08/20/2024    EGFR 110 08/20/2024      Lab Results   Component Value Date    CREATININE 0.61 08/20/2024    CREATININE 0.72 08/13/2024    CREATININE 0.68 07/06/2024    CREATININE 0.64 06/29/2024    CREATININE 0.69 05/14/2024    CREATININE 0.71 04/01/2024    CREATININE 0.70 03/13/2024    CREATININE 0.81 09/01/2023    CREATININE 0.73 08/18/2023    CREATININE 0.79 08/11/2023    CREATININE 0.75 07/05/2023    CREATININE 0.64 05/10/2023    CREATININE 0.66 05/03/2023    CREATININE 0.67 04/06/2023    CREATININE 0.62 03/27/2023      Lab Results   Component Value Date    COLORU Colorless 05/14/2024    CLARITYU Clear 05/14/2024    SPECGRAV 1.007 05/14/2024    PHUR 6.5 05/14/2024    LEUKOCYTESUR Negative 05/14/2024    NITRITE Negative 05/14/2024    PROTEIN UA Negative 05/14/2024    GLUCOSEU Negative 05/14/2024    KETONESU Negative 05/14/2024    UROBILINOGEN <2.0 05/14/2024    BILIRUBINUR Negative 05/14/2024    BLOODU Negative 05/14/2024    RBCUA None Seen 05/14/2024    WBCUA None Seen 05/14/2024    EPIS None Seen 05/14/2024    BACTERIA None Seen 05/14/2024      No results found for: \"LABPROT\"  No results found for: \"MICROALBUR\", \"[id number]\"  Lab Results   Component Value Date    WBC 4.47 05/14/2024    HGB 12.8 05/14/2024    HCT 38.9 05/14/2024    MCV 90 05/14/2024     05/14/2024    " "  Lab Results   Component Value Date    HGB 12.8 05/14/2024    HGB 13.4 07/05/2023    HGB 14.2 05/10/2023    HGB 11.6 (L) 02/28/2023    HGB 12.1 02/27/2023      Lab Results   Component Value Date    IRON 55 (L) 05/10/2023    TIBC 503 (H) 05/10/2023    FERRITIN 24 05/10/2023      No results found for: \"PTHCALCIUM\", \"[id number]\", \"PHOSPHORUS\"   Lab Results   Component Value Date    CHOLESTEROL 153 07/05/2023    HDL 52 07/05/2023    LDLCALC 85 07/05/2023    TRIG 78 07/05/2023      Lab Results   Component Value Date    URICACID 2.4 (L) 08/01/2017      Lab Results   Component Value Date    HGBA1C 5.0 02/26/2023      Lab Results   Component Value Date    U4VLWCA 0.80 06/01/2018    FREET4 1.22 05/30/2018      Lab Results   Component Value Date    RAGHU Negative 07/27/2020      Lab Results   Component Value Date    PROT 7.1 12/31/2015    UPEP  03/11/2017     The urine total protein and electrophoresis are within normal limits.  No monoclonal bands noted. Reviewed by: Manjit Jenkins MD ([id number])  *Electronic Signature**        Portions of the record may have been created with voice recognition software. Occasional wrong word or \"sound a like\" substitutions may have occurred due to the inherent limitations of voice recognition software. Read the chart carefully and recognize, using context, where substitutions have occurred. If you have any questions, please contact the dictating provider.      "

## 2024-10-16 ENCOUNTER — TELEPHONE (OUTPATIENT)
Age: 59
End: 2024-10-16

## 2024-10-16 ENCOUNTER — APPOINTMENT (OUTPATIENT)
Dept: RADIOLOGY | Facility: HOSPITAL | Age: 59
End: 2024-10-16
Payer: COMMERCIAL

## 2024-10-16 ENCOUNTER — HOSPITAL ENCOUNTER (OUTPATIENT)
Dept: BONE DENSITY | Facility: HOSPITAL | Age: 59
Discharge: HOME | End: 2024-10-16
Attending: STUDENT IN AN ORGANIZED HEALTH CARE EDUCATION/TRAINING PROGRAM
Payer: COMMERCIAL

## 2024-10-16 VITALS — WEIGHT: 128 LBS | BODY MASS INDEX: 21.85 KG/M2 | HEIGHT: 64 IN

## 2024-10-16 DIAGNOSIS — M80.00XS OSTEOPOROSIS WITH CURRENT PATHOLOGICAL FRACTURE, UNSPECIFIED OSTEOPOROSIS TYPE, SEQUELA: ICD-10-CM

## 2024-10-16 PROCEDURE — 77080 DXA BONE DENSITY AXIAL: CPT

## 2024-10-16 NOTE — TELEPHONE ENCOUNTER
[facility] pre-encounters called in stating that patient needs a prior authorization by the ordering doctor for Reclast. They stated that they need it to be done by 10/17/24 by 2pm.    #

## 2024-10-16 NOTE — TELEPHONE ENCOUNTER
I called and spoke with Cristofer. He is aware of Xray results. He is aware his PCP was made aware for treatment management.I informed him of providers advice.

## 2024-10-17 NOTE — TELEPHONE ENCOUNTER
Spoke to pts insurance and he needs a prior auth for reclast thru insurance, got the paperwork that needs to be filled out for them and left for dr gonzalez to fill out. He will reschedule after we find out if it covered or not they said it could take up to a week or two

## 2024-10-18 ENCOUNTER — HOSPITAL ENCOUNTER (OUTPATIENT)
Dept: INFUSION CENTER | Facility: HOSPITAL | Age: 59
Discharge: HOME | End: 2024-10-18

## 2024-10-21 NOTE — TELEPHONE ENCOUNTER
Mona from [facility] calling as they received a prior auth for Reclast. She stated that the only Reclast that will need a prior auth will have an NDC code [id number]. They said they will also need to know what facility this will be done at so they know where to bill it. They can be contacted at [phone] with updates.

## 2024-10-30 DIAGNOSIS — E87.1 CHRONIC HYPONATREMIA: ICD-10-CM

## 2024-10-30 RX ORDER — LISINOPRIL 2.5 MG/1
2.5 TABLET ORAL DAILY
Qty: 90 TABLET | Refills: 0 | Status: SHIPPED | OUTPATIENT
Start: 2024-10-30

## 2024-10-31 ENCOUNTER — TELEPHONE (OUTPATIENT)
Dept: ENDOCRINOLOGY | Facility: CLINIC | Age: 59
End: 2024-10-31

## 2024-10-31 NOTE — TELEPHONE ENCOUNTER
Needs prior Auth for reclast they said if you give them the J code that you should be able to get a prior Auth, the J-code 3789 the NDC code they gave doesn't have anything to do with a authorization they said

## 2024-11-07 NOTE — ASSESSMENT & PLAN NOTE
Assumed care of patient at 1900. Pain level is assessed through observation FLACC. Administered Tylenol 650 mg liquid for pain. Safety measures remain in place. Call light within reach. Will cont. to monitor.   GI referral entered upon hospital DC  Patient to follow up with them for further mgmt

## 2024-11-08 ENCOUNTER — TELEPHONE (OUTPATIENT)
Dept: ENDOCRINOLOGY | Facility: CLINIC | Age: 59
End: 2024-11-08

## 2024-11-08 DIAGNOSIS — M80.00XS OSTEOPOROSIS WITH CURRENT PATHOLOGICAL FRACTURE, UNSPECIFIED OSTEOPOROSIS TYPE, SEQUELA: Primary | ICD-10-CM

## 2024-11-08 RX ORDER — SODIUM CHLORIDE 9 MG/ML
20 INJECTION, SOLUTION INTRAVENOUS ONCE
Status: CANCELLED | OUTPATIENT
Start: 2024-11-13

## 2024-11-08 RX ORDER — ZOLEDRONIC ACID 0.05 MG/ML
5 INJECTION, SOLUTION INTRAVENOUS ONCE
Status: CANCELLED | OUTPATIENT
Start: 2024-11-13

## 2024-11-08 NOTE — TELEPHONE ENCOUNTER
Spoke to patient he is scheduled at infusion center for reclast but has to have the Generic on 11/13/2024

## 2024-11-08 NOTE — TELEPHONE ENCOUNTER
The equivalent drug they do cover I Zoledronic acid would still need a prior auth thru  [facility] but would be covered would this be able to be used instead of reclast

## 2024-11-08 NOTE — TELEPHONE ENCOUNTER
Did prior auth thru [facility] for zoledronic acid generic for reclast will wait for answer, this is on there formulary but still needed a prior Auth thru [facility]

## 2024-11-08 NOTE — TELEPHONE ENCOUNTER
Would cover zoledronic acid  5mg/100 ml which is there equivalent to reclast that would be paid for would still be done by infusion

## 2024-11-12 ENCOUNTER — TELEPHONE (OUTPATIENT)
Dept: ENDOCRINOLOGY | Facility: CLINIC | Age: 59
End: 2024-11-12

## 2024-11-12 DIAGNOSIS — M80.00XS OSTEOPOROSIS WITH CURRENT PATHOLOGICAL FRACTURE, UNSPECIFIED OSTEOPOROSIS TYPE, SEQUELA: Primary | ICD-10-CM

## 2024-11-12 NOTE — TELEPHONE ENCOUNTER
Francisco calling from [facility] and they need a quantity and day supply for the Zoledronic acid. She can be called back at [phone]

## 2024-11-12 NOTE — TELEPHONE ENCOUNTER
Spoke to representative from [facility] and she explained that because he is getting the Generic for reclast he doesn't need a prior auth done.

## 2024-11-12 NOTE — TELEPHONE ENCOUNTER
I just called the infusion center and told them he doesn't need a prior auth as per [facility] insurance

## 2024-11-13 ENCOUNTER — HOSPITAL ENCOUNTER (OUTPATIENT)
Dept: INFUSION CENTER | Facility: HOSPITAL | Age: 59
Discharge: HOME | End: 2024-11-13
Attending: STUDENT IN AN ORGANIZED HEALTH CARE EDUCATION/TRAINING PROGRAM
Payer: COMMERCIAL

## 2024-11-13 VITALS
SYSTOLIC BLOOD PRESSURE: 151 MMHG | HEART RATE: 71 BPM | RESPIRATION RATE: 18 BRPM | DIASTOLIC BLOOD PRESSURE: 91 MMHG | TEMPERATURE: 97.4 F

## 2024-11-13 DIAGNOSIS — M80.00XS OSTEOPOROSIS WITH CURRENT PATHOLOGICAL FRACTURE, UNSPECIFIED OSTEOPOROSIS TYPE, SEQUELA: Primary | ICD-10-CM

## 2024-11-13 RX ORDER — ZOLEDRONIC ACID 0.05 MG/ML
5 INJECTION, SOLUTION INTRAVENOUS ONCE
OUTPATIENT
Start: 2025-11-13

## 2024-11-13 RX ORDER — SODIUM CHLORIDE 9 MG/ML
20 INJECTION, SOLUTION INTRAVENOUS ONCE
Status: COMPLETED | OUTPATIENT
Start: 2024-11-13 | End: 2024-11-13

## 2024-11-13 RX ORDER — SODIUM CHLORIDE 9 MG/ML
20 INJECTION, SOLUTION INTRAVENOUS ONCE
OUTPATIENT
Start: 2025-11-13

## 2024-11-13 RX ORDER — ZOLEDRONIC ACID 0.05 MG/ML
5 INJECTION, SOLUTION INTRAVENOUS ONCE
Status: COMPLETED | OUTPATIENT
Start: 2024-11-13 | End: 2024-11-13

## 2024-11-13 RX ADMIN — ZOLEDRONIC ACID 5 MG: 5 INJECTION, SOLUTION INTRAVENOUS at 09:05

## 2024-11-13 RX ADMIN — SODIUM CHLORIDE 20 ML/HR: 9 INJECTION, SOLUTION INTRAVENOUS at 09:05

## 2024-11-13 NOTE — PROGRESS NOTES
Cristofer STACI Wiley  tolerated reclast well with no complications.      Cristofer Wiley is aware of future appt on 11-13-24 at 0800.     AVS declined.Appointment card provided.

## 2024-11-14 DIAGNOSIS — E87.1 CHRONIC HYPONATREMIA: ICD-10-CM

## 2024-11-14 DIAGNOSIS — M80.00XS OSTEOPOROSIS WITH CURRENT PATHOLOGICAL FRACTURE, UNSPECIFIED OSTEOPOROSIS TYPE, SEQUELA: ICD-10-CM

## 2024-11-14 DIAGNOSIS — E53.8 FOLIC ACID DEFICIENCY: ICD-10-CM

## 2024-11-14 RX ORDER — LANOLIN ALCOHOL/MO/W.PET/CERES
100 CREAM (GRAM) TOPICAL DAILY
COMMUNITY
Start: 2024-09-26

## 2024-11-14 RX ORDER — LISINOPRIL 2.5 MG/1
2.5 TABLET ORAL DAILY
Qty: 90 TABLET | Refills: 0 | OUTPATIENT
Start: 2024-11-14

## 2024-11-14 RX ORDER — PHENOL 1.4 %
600 AEROSOL, SPRAY (ML) MUCOUS MEMBRANE DAILY
Qty: 100 TABLET | Refills: 3 | OUTPATIENT
Start: 2024-11-14

## 2024-11-14 RX ORDER — LISINOPRIL 2.5 MG/1
2.5 TABLET ORAL DAILY
Qty: 90 TABLET | Refills: 0 | Status: SHIPPED | OUTPATIENT
Start: 2024-11-14

## 2024-11-14 RX ORDER — FOLIC ACID 1 MG/1
1000 TABLET ORAL DAILY
Qty: 90 TABLET | Refills: 0 | OUTPATIENT
Start: 2024-11-14

## 2024-11-14 RX ORDER — FOLIC ACID 1 MG/1
1000 TABLET ORAL DAILY
Qty: 90 TABLET | Refills: 0 | Status: SHIPPED | OUTPATIENT
Start: 2024-11-14

## 2024-11-15 ENCOUNTER — OFFICE VISIT (OUTPATIENT)
Dept: PAIN MEDICINE | Facility: CLINIC | Age: 59
End: 2024-11-15
Payer: COMMERCIAL

## 2024-11-15 VITALS
RESPIRATION RATE: 16 BRPM | WEIGHT: 125 LBS | TEMPERATURE: 98.4 F | BODY MASS INDEX: 21.34 KG/M2 | HEIGHT: 64 IN | DIASTOLIC BLOOD PRESSURE: 82 MMHG | OXYGEN SATURATION: 92 % | HEART RATE: 92 BPM | SYSTOLIC BLOOD PRESSURE: 116 MMHG

## 2024-11-15 DIAGNOSIS — G89.4 CHRONIC PAIN SYNDROME: Primary | ICD-10-CM

## 2024-11-15 DIAGNOSIS — M47.816 LUMBAR SPONDYLOSIS: ICD-10-CM

## 2024-11-15 DIAGNOSIS — M54.16 LUMBAR RADICULOPATHY: ICD-10-CM

## 2024-11-15 DIAGNOSIS — M51.369 LUMBAR DEGENERATIVE DISC DISEASE: ICD-10-CM

## 2024-11-15 DIAGNOSIS — M48.061 SPINAL STENOSIS OF LUMBAR REGION, UNSPECIFIED WHETHER NEUROGENIC CLAUDICATION PRESENT: ICD-10-CM

## 2024-11-15 PROCEDURE — 99214 OFFICE O/P EST MOD 30 MIN: CPT | Performed by: NURSE PRACTITIONER

## 2024-11-15 RX ORDER — PREGABALIN 100 MG/1
100 CAPSULE ORAL 3 TIMES DAILY
Qty: 90 CAPSULE | Refills: 2 | Status: SHIPPED | OUTPATIENT
Start: 2024-11-15 | End: 2025-02-13

## 2024-11-15 NOTE — PROGRESS NOTES
Assessment:  1. Chronic pain syndrome    2. Lumbar radiculopathy    3. Lumbar spondylosis    4. Lumbar degenerative disc disease    5. Spinal stenosis of lumbar region, unspecified whether neurogenic claudication present        Plan:  While the patient was in the office today, I did have a thorough conversation regarding their chronic pain syndrome, medication management, and treatment plan options.  Patient's pain remains more than 50% improved following bilateral L4-5 and L5-S1 radiofrequency ablations.  Right side was performed on 4/26/2024.  The left side was performed on 4/3/2024.    Patient completed a course of physical therapy since our last visit.  He continues with home exercise program for lumbar strengthening/stretching.    Continue Lyrica 100 mg 3 times daily.  A prescription was sent to his pharmacy with 2 refills.    Follow-up in 3 months.  Call sooner if pain increases.      History of Present Illness:  The patient is a 59 y.o. male who presents for a follow up office visit in regards to Back Pain.   The patient’s current symptoms include complaints of low back pain.  Current pain level varies from a 2-4/10.  Quality pain is described as sharp and intermittent.    Current pain medications includes: Lyrica 100 mg 3 times daily.  The patient reports that this regimen is providing more than 50% pain relief.  The patient is reporting no side effects from this pain medication regimen.    I have personally reviewed and/or updated the patient's past medical history, past surgical history, family history, social history, current medications, allergies, and vital signs today.         Review of Systems  Review of Systems   Musculoskeletal:  Positive for back pain and gait problem.        Decreased ROM   All other systems reviewed and are negative.          Past Medical History:   Diagnosis Date    Alcohol abuse     Traore esophagus     Bowel obstruction (HCC)     Bowel perforation (HCC)     Cardiac disease      Continuous chronic alcoholism (HCC) 10/5/2017    COPD (chronic obstructive pulmonary disease) (HCC)     History of shoulder surgery     Right shoulder    History of transfusion     Hx of cervical spine surgery     Hypertension     Incisional hernia 10/8/2017    MI, old     Mitral regurgitation     Psychiatric disorder     Seizures (HCC)        Past Surgical History:   Procedure Laterality Date    APPENDECTOMY      BACK SURGERY      CHOLECYSTECTOMY      ESOPHAGOGASTRODUODENOSCOPY N/A 11/28/2016    Procedure: ESOPHAGOGASTRODUODENOSCOPY (EGD);  Surgeon: Darryl Monroy MD;  Location: [facility];  Service:     GALLBLADDER SURGERY      LAPAROTOMY N/A 10/25/2016    Procedure: LAPAROTOMY EXPLORATORY;  Surgeon: Renzo Harper MD;  Location: [facility];  Service:     MOUTH SURGERY      NERVE BLOCK Bilateral 2/15/2024    Procedure: Bilateral L4-5 and L5-S1 medial branch block #1;  Surgeon: Jonathan Garcia MD;  Location: [facility];  Service: Pain Management     NERVE BLOCK Bilateral 3/7/2024    Procedure: BLOCK MEDIAL BRANCH B/L L4-5 and L5-S1 MBB#2;  Surgeon: Jonathan Garcia MD;  Location: [facility];  Service: Pain Management     PERCUTANEOUS PINNING FEMORAL NECK FRACTURE      RADIOFREQUENCY ABLATION Left 4/3/2024    Procedure: Left L4-L5 and L5-S1 radiofrequency ablation;  Surgeon: Jonathan Garcia MD;  Location: [facility];  Service: Pain Management     RADIOFREQUENCY ABLATION Right 4/26/2024    Procedure: Right L4-L5 and L5-S1 radiofrequency ablation;  Surgeon: Jonathan Garcia MD;  Location: [facility];  Service: Pain Management     SHOULDER SURGERY Right     SHOULDER SURGERY      SMALL INTESTINE SURGERY      STOMACH SURGERY      bal surgery       Family History   Problem Relation Age of Onset    Breast cancer Mother     Prostate cancer Father     Skin cancer Brother        Social History     Occupational History    Not on file   Tobacco Use    Smoking status: Every Day     Current packs/day:  1.00     Average packs/day: 1 pack/day for 35.0 years (35.0 ttl pk-yrs)     Types: Cigarettes    Smokeless tobacco: Never   Vaping Use    Vaping status: Never Used   Substance and Sexual Activity    Alcohol use: Not Currently     Alcohol/week: 42.0 standard drinks of alcohol     Types: 42 Cans of beer per week     Comment: no alcoholic drinks for 23 months - now non-alcoholic drinks    Drug use: No    Sexual activity: Not Currently     Partners: Female         Current Outpatient Medications:     albuterol (PROVENTIL HFA,VENTOLIN HFA) 90 mcg/act inhaler, Inhale 2 puffs every 6 (six) hours as needed for wheezing, Disp: 9 g, Rfl: 0    Anoro Ellipta 62.5-25 MCG/ACT inhaler, Inhale 1 puff by mouth once daily, Disp: 180 each, Rfl: 0    calcium carbonate (OS-KORI) 600 MG tablet, Take 1 tablet (600 mg total) by mouth daily, Disp: 100 tablet, Rfl: 3    cyanocobalamin (VITAMIN B-12) 100 mcg tablet, Take 1 tablet by mouth once daily, Disp: 90 tablet, Rfl: 0    Diclofenac Sodium (VOLTAREN) 1 %, Apply 2 g topically 4 (four) times a day (Patient taking differently: Apply 2 g topically if needed), Disp: 350 g, Rfl: 2    doxepin (SINEquan) 50 mg capsule, Take 1 capsule by mouth once daily at bedtime, Disp: 30 capsule, Rfl: 0    levETIRAcetam (KEPPRA) 750 mg tablet, Take 1 tablet (750 mg total) by mouth every 12 (twelve) hours, Disp: 180 tablet, Rfl: 2    Multiple Vitamin (TAB-A-BONI PO), Take 1 tablet by mouth daily, Disp: , Rfl:     omeprazole (PriLOSEC) 40 MG capsule, Take 1 capsule (40 mg total) by mouth 2 (two) times a day, Disp: 180 capsule, Rfl: 0    pregabalin (LYRICA) 100 mg capsule, Take 1 capsule (100 mg total) by mouth 3 (three) times a day, Disp: 90 capsule, Rfl: 2    sodium chloride 1 g tablet, Take 3 tablets (3 g total) by mouth 2 (two) times a day, Disp: 240 tablet, Rfl: 0    thiamine (VITAMIN B1) 100 mg tablet, Take 1 tablet (100 mg total) by mouth daily, Disp: 90 tablet, Rfl: 1    thiamine 100 MG tablet, Take 100 mg  "by mouth daily, Disp: , Rfl:     Vitamin D, Cholecalciferol, 25 MCG (1000 UT) TABS, Take 1 tablet by mouth once daily, Disp: 90 tablet, Rfl: 3    folic acid (FOLVITE) 1 mg tablet, Take 1 tablet (1,000 mcg total) by mouth daily, Disp: 90 tablet, Rfl: 0    lisinopril (ZESTRIL) 2.5 mg tablet, Take 1 tablet (2.5 mg total) by mouth daily, Disp: 90 tablet, Rfl: 0    No Known Allergies    Physical Exam:    /82   Pulse 92   Temp 98.4 °F (36.9 °C)   Resp 16   Ht 5' 4\" (1.626 m)   Wt 56.7 kg (125 lb)   SpO2 92%   BMI 21.46 kg/m²     Constitutional:normal, well developed, well nourished, alert, in no distress and non-toxic and no overt pain behavior.  Eyes:anicteric  HEENT:grossly intact  Neck:supple, symmetric, trachea midline and no masses   Pulmonary:even and unlabored  Cardiovascular:No edema or pitting edema present  Skin:Normal without rashes or lesions and well hydrated  Psychiatric:Mood and affect appropriate  Neurologic:Cranial Nerves II-XII grossly intact  Musculoskeletal:normal    Imaging    Study Result    Narrative & Impression   LUMBAR SPINE     INDICATION:   R52: Pain, unspecified.     COMPARISON: 5/9/2023     VIEWS:  XR SPINE LUMBAR 2 OR 3 VIEWS INJURY        FINDINGS:     There are 5 non rib bearing lumbar vertebral bodies.     Mild L3 compression deformity.     Alignment is unremarkable without dynamic instability.     Age-appropriate lumbar degenerative changes are seen.     The pedicles appear intact.     There are atherosclerotic calcifications. Soft tissues are otherwise unremarkable.     IMPRESSION:  Mild L3 compression deformity. Correlate with clinical exam. Degenerative changes as described.     The study was marked in EPIC for significant notification.     Workstation performed: [id number]              No orders to display       No orders of the defined types were placed in this encounter.     "

## 2024-11-29 DIAGNOSIS — E87.1 HYPONATREMIA: ICD-10-CM

## 2024-11-29 RX ORDER — SODIUM CHLORIDE 1 G/1
3 TABLET ORAL 2 TIMES DAILY
Qty: 240 TABLET | Refills: 0 | Status: SHIPPED | OUTPATIENT
Start: 2024-11-29

## 2024-12-06 DIAGNOSIS — J44.1 CHRONIC OBSTRUCTIVE PULMONARY DISEASE WITH ACUTE EXACERBATION (HCC): ICD-10-CM

## 2024-12-06 RX ORDER — UMECLIDINIUM BROMIDE AND VILANTEROL TRIFENATATE 62.5; 25 UG/1; UG/1
1 POWDER RESPIRATORY (INHALATION) DAILY
Qty: 180 EACH | Refills: 1 | Status: SHIPPED | OUTPATIENT
Start: 2024-12-06

## 2024-12-19 DIAGNOSIS — E53.8 FOLIC ACID DEFICIENCY: ICD-10-CM

## 2024-12-19 DIAGNOSIS — K21.00 GASTROESOPHAGEAL REFLUX DISEASE WITH ESOPHAGITIS WITHOUT HEMORRHAGE: ICD-10-CM

## 2024-12-19 DIAGNOSIS — F10.11 HISTORY OF ALCOHOL ABUSE: ICD-10-CM

## 2024-12-20 RX ORDER — OMEPRAZOLE 40 MG/1
40 CAPSULE, DELAYED RELEASE ORAL 2 TIMES DAILY
Qty: 180 CAPSULE | Refills: 0 | OUTPATIENT
Start: 2024-12-20

## 2024-12-20 RX ORDER — FOLIC ACID 1 MG/1
1000 TABLET ORAL DAILY
Qty: 90 TABLET | Refills: 0 | OUTPATIENT
Start: 2024-12-20

## 2024-12-20 RX ORDER — MULTIVIT-MIN/IRON/FOLIC ACID/K 18-600-40
1 CAPSULE ORAL DAILY
Qty: 90 TABLET | Refills: 3 | OUTPATIENT
Start: 2024-12-20

## 2025-01-07 ENCOUNTER — APPOINTMENT (OUTPATIENT)
Dept: LAB | Facility: MEDICAL CENTER | Age: 60
End: 2025-01-07
Payer: COMMERCIAL

## 2025-01-07 ENCOUNTER — OFFICE VISIT (OUTPATIENT)
Dept: FAMILY MEDICINE CLINIC | Facility: CLINIC | Age: 60
End: 2025-01-07
Payer: COMMERCIAL

## 2025-01-07 ENCOUNTER — RESULTS FOLLOW-UP (OUTPATIENT)
Dept: FAMILY MEDICINE CLINIC | Facility: CLINIC | Age: 60
End: 2025-01-07

## 2025-01-07 VITALS
WEIGHT: 123 LBS | RESPIRATION RATE: 18 BRPM | SYSTOLIC BLOOD PRESSURE: 116 MMHG | HEART RATE: 78 BPM | OXYGEN SATURATION: 95 % | DIASTOLIC BLOOD PRESSURE: 84 MMHG | TEMPERATURE: 97.2 F | BODY MASS INDEX: 21 KG/M2 | HEIGHT: 64 IN

## 2025-01-07 DIAGNOSIS — K22.719 BARRETT'S ESOPHAGUS WITH DYSPLASIA: ICD-10-CM

## 2025-01-07 DIAGNOSIS — R33.9 URINARY RETENTION: ICD-10-CM

## 2025-01-07 DIAGNOSIS — M80.00XS OSTEOPOROSIS WITH CURRENT PATHOLOGICAL FRACTURE, UNSPECIFIED OSTEOPOROSIS TYPE, SEQUELA: ICD-10-CM

## 2025-01-07 DIAGNOSIS — G40.909 SEIZURE DISORDER (HCC): ICD-10-CM

## 2025-01-07 DIAGNOSIS — E87.1 CHRONIC HYPONATREMIA: ICD-10-CM

## 2025-01-07 DIAGNOSIS — Z00.00 ANNUAL PHYSICAL EXAM: ICD-10-CM

## 2025-01-07 DIAGNOSIS — I77.819 AORTIC ECTASIA (HCC): ICD-10-CM

## 2025-01-07 DIAGNOSIS — I77.4 CELIAC ARTERY STENOSIS (HCC): ICD-10-CM

## 2025-01-07 DIAGNOSIS — Z23 ENCOUNTER FOR IMMUNIZATION: ICD-10-CM

## 2025-01-07 DIAGNOSIS — R15.1 FECAL SMEARING: ICD-10-CM

## 2025-01-07 DIAGNOSIS — F51.04 PSYCHOPHYSIOLOGICAL INSOMNIA: ICD-10-CM

## 2025-01-07 DIAGNOSIS — K21.00 GASTROESOPHAGEAL REFLUX DISEASE WITH ESOPHAGITIS WITHOUT HEMORRHAGE: ICD-10-CM

## 2025-01-07 DIAGNOSIS — M47.816 LUMBAR SPONDYLOSIS: ICD-10-CM

## 2025-01-07 DIAGNOSIS — I95.9 HYPOTENSION, UNSPECIFIED HYPOTENSION TYPE: ICD-10-CM

## 2025-01-07 DIAGNOSIS — F10.11 HISTORY OF ALCOHOL ABUSE: ICD-10-CM

## 2025-01-07 DIAGNOSIS — E53.8 FOLIC ACID DEFICIENCY: ICD-10-CM

## 2025-01-07 DIAGNOSIS — R35.1 BENIGN PROSTATIC HYPERPLASIA WITH NOCTURIA: ICD-10-CM

## 2025-01-07 DIAGNOSIS — J44.1 CHRONIC OBSTRUCTIVE PULMONARY DISEASE WITH ACUTE EXACERBATION (HCC): ICD-10-CM

## 2025-01-07 DIAGNOSIS — K26.9 DUODENAL ULCER: ICD-10-CM

## 2025-01-07 DIAGNOSIS — E87.1 HYPONATREMIA: ICD-10-CM

## 2025-01-07 DIAGNOSIS — F17.200 TOBACCO DEPENDENCE: ICD-10-CM

## 2025-01-07 DIAGNOSIS — Z12.5 SCREENING FOR PROSTATE CANCER: ICD-10-CM

## 2025-01-07 DIAGNOSIS — Z72.0 TOBACCO ABUSE: ICD-10-CM

## 2025-01-07 DIAGNOSIS — M48.061 SPINAL STENOSIS OF LUMBAR REGION, UNSPECIFIED WHETHER NEUROGENIC CLAUDICATION PRESENT: ICD-10-CM

## 2025-01-07 DIAGNOSIS — K70.10 ALCOHOLIC HEPATITIS WITHOUT ASCITES: ICD-10-CM

## 2025-01-07 DIAGNOSIS — T78.40XA ALLERGY, INITIAL ENCOUNTER: ICD-10-CM

## 2025-01-07 DIAGNOSIS — M51.369 LUMBAR DEGENERATIVE DISC DISEASE: ICD-10-CM

## 2025-01-07 DIAGNOSIS — F17.210 SMOKING GREATER THAN 20 PACK YEARS: ICD-10-CM

## 2025-01-07 DIAGNOSIS — I10 ESSENTIAL HYPERTENSION: Primary | ICD-10-CM

## 2025-01-07 DIAGNOSIS — N40.1 BENIGN PROSTATIC HYPERPLASIA WITH NOCTURIA: ICD-10-CM

## 2025-01-07 DIAGNOSIS — H61.21 CERUMEN DEBRIS ON TYMPANIC MEMBRANE OF RIGHT EAR: ICD-10-CM

## 2025-01-07 PROBLEM — F10.90 ALCOHOL USE: Status: RESOLVED | Noted: 2022-05-20 | Resolved: 2025-01-07

## 2025-01-07 PROBLEM — Z78.9 ALCOHOL USE: Status: RESOLVED | Noted: 2022-05-20 | Resolved: 2025-01-07

## 2025-01-07 PROBLEM — F10.20 CONTINUOUS CHRONIC ALCOHOLISM (HCC): Status: RESOLVED | Noted: 2017-10-05 | Resolved: 2025-01-07

## 2025-01-07 LAB
ALBUMIN SERPL BCG-MCNC: 4.3 G/DL (ref 3.5–5)
ALP SERPL-CCNC: 101 U/L (ref 34–104)
ALT SERPL W P-5'-P-CCNC: 12 U/L (ref 7–52)
ANION GAP SERPL CALCULATED.3IONS-SCNC: 10 MMOL/L (ref 4–13)
AST SERPL W P-5'-P-CCNC: 34 U/L (ref 13–39)
BASOPHILS # BLD AUTO: 0.03 THOUSANDS/ΜL (ref 0–0.1)
BASOPHILS NFR BLD AUTO: 0 % (ref 0–1)
BILIRUB SERPL-MCNC: 0.77 MG/DL (ref 0.2–1)
BUN SERPL-MCNC: 6 MG/DL (ref 5–25)
CALCIUM PRE 500 MG CA PO UR-SCNC: 4.1 MG/DL
CALCIUM SERPL-MCNC: 9.2 MG/DL (ref 8.4–10.2)
CHLORIDE SERPL-SCNC: 92 MMOL/L (ref 96–108)
CHOLEST SERPL-MCNC: 149 MG/DL (ref ?–200)
CO2 SERPL-SCNC: 29 MMOL/L (ref 21–32)
CREAT SERPL-MCNC: 0.57 MG/DL (ref 0.6–1.3)
CREAT UR-MCNC: 53.2 MG/DL
EOSINOPHIL # BLD AUTO: 0.03 THOUSAND/ΜL (ref 0–0.61)
EOSINOPHIL NFR BLD AUTO: 0 % (ref 0–6)
ERYTHROCYTE [DISTWIDTH] IN BLOOD BY AUTOMATED COUNT: 15.1 % (ref 11.6–15.1)
GFR SERPL CREATININE-BSD FRML MDRD: 113 ML/MIN/1.73SQ M
GLUCOSE P FAST SERPL-MCNC: 88 MG/DL (ref 65–99)
HCT VFR BLD AUTO: 42.1 % (ref 36.5–49.3)
HDLC SERPL-MCNC: 79 MG/DL
HGB BLD-MCNC: 14 G/DL (ref 12–17)
IMM GRANULOCYTES # BLD AUTO: 0.02 THOUSAND/UL (ref 0–0.2)
IMM GRANULOCYTES NFR BLD AUTO: 0 % (ref 0–2)
LDLC SERPL CALC-MCNC: 58 MG/DL (ref 0–100)
LEVETIRACETAM SERPL-MCNC: 32.1 UG/ML (ref 12–46)
LYMPHOCYTES # BLD AUTO: 1.46 THOUSANDS/ΜL (ref 0.6–4.47)
LYMPHOCYTES NFR BLD AUTO: 18 % (ref 14–44)
MCH RBC QN AUTO: 31 PG (ref 26.8–34.3)
MCHC RBC AUTO-ENTMCNC: 33.3 G/DL (ref 31.4–37.4)
MCV RBC AUTO: 93 FL (ref 82–98)
MONOCYTES # BLD AUTO: 0.81 THOUSAND/ΜL (ref 0.17–1.22)
MONOCYTES NFR BLD AUTO: 10 % (ref 4–12)
NEUTROPHILS # BLD AUTO: 5.92 THOUSANDS/ΜL (ref 1.85–7.62)
NEUTS SEG NFR BLD AUTO: 72 % (ref 43–75)
NRBC BLD AUTO-RTO: 0 /100 WBCS
PLATELET # BLD AUTO: 180 THOUSANDS/UL (ref 149–390)
PMV BLD AUTO: 10.4 FL (ref 8.9–12.7)
POTASSIUM SERPL-SCNC: 4.2 MMOL/L (ref 3.5–5.3)
PROT SERPL-MCNC: 8 G/DL (ref 6.4–8.4)
PSA SERPL-MCNC: 1.24 NG/ML (ref 0–4)
RBC # BLD AUTO: 4.51 MILLION/UL (ref 3.88–5.62)
SODIUM SERPL-SCNC: 131 MMOL/L (ref 135–147)
TRIGL SERPL-MCNC: 59 MG/DL (ref ?–150)
WBC # BLD AUTO: 8.27 THOUSAND/UL (ref 4.31–10.16)

## 2025-01-07 PROCEDURE — 85025 COMPLETE CBC W/AUTO DIFF WBC: CPT

## 2025-01-07 PROCEDURE — 80061 LIPID PANEL: CPT

## 2025-01-07 PROCEDURE — G0103 PSA SCREENING: HCPCS

## 2025-01-07 PROCEDURE — 80053 COMPREHEN METABOLIC PANEL: CPT

## 2025-01-07 PROCEDURE — 36415 COLL VENOUS BLD VENIPUNCTURE: CPT

## 2025-01-07 PROCEDURE — 83520 IMMUNOASSAY QUANT NOS NONAB: CPT

## 2025-01-07 PROCEDURE — T1015 CLINIC SERVICE: HCPCS | Performed by: FAMILY MEDICINE

## 2025-01-07 RX ORDER — MULTIVIT-MIN/IRON/FOLIC ACID/K 18-600-40
1 CAPSULE ORAL DAILY
Qty: 90 TABLET | Refills: 3 | Status: SHIPPED | OUTPATIENT
Start: 2025-01-07

## 2025-01-07 RX ORDER — PREGABALIN 100 MG/1
100 CAPSULE ORAL 3 TIMES DAILY
Qty: 90 CAPSULE | Refills: 2 | Status: CANCELLED | OUTPATIENT
Start: 2025-01-07 | End: 2025-04-07

## 2025-01-07 RX ORDER — MULTIVITAMIN WITH FOLIC ACID 400 MCG
1 TABLET ORAL DAILY
Qty: 30 TABLET | Refills: 6 | Status: SHIPPED | OUTPATIENT
Start: 2025-01-07 | End: 2025-02-06

## 2025-01-07 RX ORDER — ALBUTEROL SULFATE 90 UG/1
2 INHALANT RESPIRATORY (INHALATION) EVERY 6 HOURS PRN
Qty: 9 G | Refills: 0 | Status: SHIPPED | OUTPATIENT
Start: 2025-01-07

## 2025-01-07 RX ORDER — LEVETIRACETAM 750 MG/1
750 TABLET ORAL EVERY 12 HOURS
Qty: 180 TABLET | Refills: 2 | Status: SHIPPED | OUTPATIENT
Start: 2025-01-07

## 2025-01-07 RX ORDER — SODIUM CHLORIDE 1 G/1
3 TABLET ORAL 2 TIMES DAILY
Qty: 240 TABLET | Refills: 0 | Status: SHIPPED | OUTPATIENT
Start: 2025-01-07

## 2025-01-07 RX ORDER — THIAMINE MONONITRATE (VIT B1) 100 MG
100 TABLET ORAL DAILY
Qty: 90 TABLET | Refills: 1 | Status: SHIPPED | OUTPATIENT
Start: 2025-01-07

## 2025-01-07 RX ORDER — OMEPRAZOLE 40 MG/1
40 CAPSULE, DELAYED RELEASE ORAL 2 TIMES DAILY
Qty: 180 CAPSULE | Refills: 0 | Status: SHIPPED | OUTPATIENT
Start: 2025-01-07

## 2025-01-07 RX ORDER — DOXEPIN HYDROCHLORIDE 50 MG/1
50 CAPSULE ORAL
Qty: 30 CAPSULE | Refills: 0 | Status: SHIPPED | OUTPATIENT
Start: 2025-01-07

## 2025-01-07 RX ORDER — AZITHROMYCIN 250 MG/1
500 TABLET, FILM COATED ORAL EVERY 24 HOURS
Qty: 14 TABLET | Refills: 0 | Status: SHIPPED | OUTPATIENT
Start: 2025-01-07 | End: 2025-01-14

## 2025-01-07 RX ORDER — FOLIC ACID 1 MG/1
1000 TABLET ORAL DAILY
Qty: 90 TABLET | Refills: 0 | Status: SHIPPED | OUTPATIENT
Start: 2025-01-07

## 2025-01-07 RX ORDER — UMECLIDINIUM BROMIDE AND VILANTEROL TRIFENATATE 62.5; 25 UG/1; UG/1
1 POWDER RESPIRATORY (INHALATION) DAILY
Qty: 180 EACH | Refills: 1 | Status: SHIPPED | OUTPATIENT
Start: 2025-01-07

## 2025-01-07 RX ORDER — LISINOPRIL 2.5 MG/1
2.5 TABLET ORAL DAILY
Qty: 90 TABLET | Refills: 0 | Status: SHIPPED | OUTPATIENT
Start: 2025-01-07

## 2025-01-07 RX ORDER — UBIDECARENONE 75 MG
100 CAPSULE ORAL DAILY
Qty: 90 TABLET | Refills: 0 | Status: SHIPPED | OUTPATIENT
Start: 2025-01-07

## 2025-01-07 RX ORDER — TAMSULOSIN HYDROCHLORIDE 0.4 MG/1
0.4 CAPSULE ORAL
Qty: 30 CAPSULE | Refills: 0 | Status: SHIPPED | OUTPATIENT
Start: 2025-01-07 | End: 2025-02-06

## 2025-01-07 RX ORDER — PHENOL 1.4 %
600 AEROSOL, SPRAY (ML) MUCOUS MEMBRANE DAILY
Qty: 100 TABLET | Refills: 3 | Status: SHIPPED | OUTPATIENT
Start: 2025-01-07

## 2025-01-07 NOTE — ASSESSMENT & PLAN NOTE
Patient waking up every 2 hours at night, to pee, prostate exam performed, appreciate enlarged prostate, Flomax ordered, will reassess in 2 weeks.  Orders:  •  tamsulosin (FLOMAX) 0.4 mg; Take 1 capsule (0.4 mg total) by mouth daily with dinner

## 2025-01-07 NOTE — ASSESSMENT & PLAN NOTE
No ascites appreciated during physical exam, no longer drinking alcohol, last liver enzymes August 20, AST 61, ALT 24.  Orders:  •  thiamine (VITAMIN B1) 100 mg tablet; Take 1 tablet (100 mg total) by mouth daily

## 2025-01-07 NOTE — ASSESSMENT & PLAN NOTE
Pt have been using albuterol daily, and anoro ellipta, never heard of a spacer, ordered one to try, pt with a week of cough, congestion, rhinorrhea, sore throat.  And is getting progressively getting worse, ordered azithromycin for a week for the COPD exacerbation.  Orders:  •  albuterol (PROVENTIL HFA,VENTOLIN HFA) 90 mcg/act inhaler; Inhale 2 puffs every 6 (six) hours as needed for wheezing  •  umeclidinium-vilanterol (Anoro Ellipta) 62.5-25 mcg/actuation inhaler; Inhale 1 puff daily  •  Spacer Device for Inhaler  •  azithromycin (ZITHROMAX) 250 mg tablet; Take 2 tablets (500 mg total) by mouth every 24 hours for 7 days

## 2025-01-07 NOTE — ASSESSMENT & PLAN NOTE
Orders:  •  albuterol (PROVENTIL HFA,VENTOLIN HFA) 90 mcg/act inhaler; Inhale 2 puffs every 6 (six) hours as needed for wheezing  •  umeclidinium-vilanterol (Anoro Ellipta) 62.5-25 mcg/actuation inhaler; Inhale 1 puff daily  •  Spacer Device for Inhaler  •  azithromycin (ZITHROMAX) 250 mg tablet; Take 2 tablets (500 mg total) by mouth every 24 hours for 7 days

## 2025-01-07 NOTE — ASSESSMENT & PLAN NOTE
Patient will Every 2 hours at night to pee, and then go back to sleep, and sleeping around 6 to 7 hours a day  Orders:  •  doxepin (SINEquan) 50 mg capsule; Take 1 capsule (50 mg total) by mouth daily at bedtime

## 2025-01-07 NOTE — PATIENT INSTRUCTIONS
"Patient Education     Routine physical for adults   The Basics   Written by the doctors and editors at [facility]   What is a physical? -- A physical is a routine visit, or \"check-up,\" with your doctor. You might also hear it called a \"wellness visit\" or \"preventive visit.\"  During each visit, the doctor will:   Ask about your physical and mental health   Ask about your habits, behaviors, and lifestyle   Do an exam   Give you vaccines if needed   Talk to you about any medicines you take   Give advice about your health   Answer your questions  Getting regular check-ups is an important part of taking care of your health. It can help your doctor find and treat any problems you have. But it's also important for preventing health problems.  A routine physical is different from a \"sick visit.\" A sick visit is when you see a doctor because of a health concern or problem. Since physicals are scheduled ahead of time, you can think about what you want to ask the doctor.  How often should I get a physical? -- It depends on your age and health. In general, for people age 21 years and older:   If you are younger than 50 years, you might be able to get a physical every 3 years.   If you are 50 years or older, your doctor might recommend a physical every year.  If you have an ongoing health condition, like diabetes or high blood pressure, your doctor will probably want to see you more often.  What happens during a physical? -- In general, each visit will include:   Physical exam - The doctor or nurse will check your height, weight, heart rate, and blood pressure. They will also look at your eyes and ears. They will ask about how you are feeling and whether you have any symptoms that bother you.   Medicines - It's a good idea to bring a list of all the medicines you take to each doctor visit. Your doctor will talk to you about your medicines and answer any questions. Tell them if you are having any side effects that bother you. You " "should also tell them if you are having trouble paying for any of your medicines.   Habits and behaviors - This includes:   Your diet   Your exercise habits   Whether you smoke, drink alcohol, or use drugs   Whether you are sexually active   Whether you feel safe at home  Your doctor will talk to you about things you can do to improve your health and lower your risk of health problems. They will also offer help and support. For example, if you want to quit smoking, they can give you advice and might prescribe medicines. If you want to improve your diet or get more physical activity, they can help you with this, too.   Lab tests, if needed - The tests you get will depend on your age and situation. For example, your doctor might want to check your:   Cholesterol   Blood sugar   Iron level   Vaccines - The recommended vaccines will depend on your age, health, and what vaccines you already had. Vaccines are very important because they can prevent certain serious or deadly infections.   Discussion of screening - \"Screening\" means checking for diseases or other health problems before they cause symptoms. Your doctor can recommend screening based on your age, risk, and preferences. This might include tests to check for:   Cancer, such as breast, prostate, cervical, ovarian, colorectal, prostate, lung, or skin cancer   Sexually transmitted infections, such as chlamydia and gonorrhea   Mental health conditions like depression and anxiety  Your doctor will talk to you about the different types of screening tests. They can help you decide which screenings to have. They can also explain what the results might mean.   Answering questions - The physical is a good time to ask the doctor or nurse questions about your health. If needed, they can refer you to other doctors or specialists, too.  Adults older than 65 years often need other care, too. As you get older, your doctor will talk to you about:   How to prevent falling at " home   Hearing or vision tests   Memory testing   How to take your medicines safely   Making sure that you have the help and support you need at home  All topics are updated as new evidence becomes available and our peer review process is complete.  This topic retrieved from [employer] on: May 02, 2024.  Topic 242830 Version 1.0  Release: 32.4.3 - C32.122  © 2024 UpToDate, Inc. and/or its affiliates. All rights reserved.  Consumer Information Use and Disclaimer   Disclaimer: This generalized information is a limited summary of diagnosis, treatment, and/or medication information. It is not meant to be comprehensive and should be used as a tool to help the user understand and/or assess potential diagnostic and treatment options. It does NOT include all information about conditions, treatments, medications, side effects, or risks that may apply to a specific patient. It is not intended to be medical advice or a substitute for the medical advice, diagnosis, or treatment of a health care provider based on the health care provider's examination and assessment of a patient's specific and unique circumstances. Patients must speak with a health care provider for complete information about their health, medical questions, and treatment options, including any risks or benefits regarding use of medications. This information does not endorse any treatments or medications as safe, effective, or approved for treating a specific patient. UpToDate, Inc. and its affiliates disclaim any warranty or liability relating to this information or the use thereof.The use of this information is governed by the Terms of Use, available at https://www.woltersCalleoouwer.com/en/know/clinical-effectiveness-terms. 2024© UpToDate, Inc. and its affiliates and/or licensors. All rights reserved.  Copyright   © 2024 UpToDate, Inc. and/or its affiliates. All rights reserved.

## 2025-01-07 NOTE — PROGRESS NOTES
I discussed with him that he is a candidate for lung cancer CT screening.     The following Shared Decision-Making points were covered:  Benefits of screening were discussed, including the rates of reduction in death from lung cancer and other causes.  Harms of screening were reviewed, including false positive tests, radiation exposure levels, risks of invasive procedures, risks of complications of screening, and risk of overdiagnosis.  I counseled on the importance of adherence to annual lung cancer LDCT screening, impact of co-morbidities, and ability or willingness to undergo diagnosis and treatment.  I counseled on the importance of maintaining abstinence as a former smoker or was counseled on the importance of smoking cessation if a current smoker    Review of Eligibility Criteria: He meets all of the criteria for Lung Cancer Screening.   He is 59 y.o.   He has 20 pack year tobacco history and is a current smoker or has quit within the past 15 years  He presents no signs or symptoms of lung cancer    After discussion, the patient decided to elect lung cancer screening.Adult Annual Physical  Name: Cristofer Wiley      : 1965      MRN: [id number]  Encounter Provider: Buddy Palacios DO  Encounter Date: 2025   Encounter department: [facility]    Assessment & Plan  Annual physical exam  59 years old with chronic COPD, extensive back surgeries, chronic hyponatremia, now 1.5 pack a day here for annual.   Orders:  •  Lipid Panel with Direct LDL reflex; Future  •  CBC and differential; Future  •  Comprehensive metabolic panel; Future  •  Multiple Vitamin (Tab-A-Rosa) TABS; Take 1 tablet by mouth daily  •  Zoster Vaccine Recombinant IM  •  influenza vaccine, recombinant, PF, 0.5 mL IM (Flublok)  •  Hepatitis A vaccine adult IM  •  Ambulatory Referral to Allergy; Future  •  Ambulatory Referral to Dentistry; Future    Chronic obstructive pulmonary disease with acute exacerbation  ([facility])  Pt have been using albuterol daily, and anoro ellipta, never heard of a spacer, ordered one to try, pt with a week of cough, congestion, rhinorrhea, sore throat.  And is getting progressively getting worse, ordered azithromycin for a week for the COPD exacerbation.  Orders:  •  albuterol (PROVENTIL HFA,VENTOLIN HFA) 90 mcg/act inhaler; Inhale 2 puffs every 6 (six) hours as needed for wheezing  •  umeclidinium-vilanterol (Anoro Ellipta) 62.5-25 mcg/actuation inhaler; Inhale 1 puff daily  •  Spacer Device for Inhaler  •  azithromycin (ZITHROMAX) 250 mg tablet; Take 2 tablets (500 mg total) by mouth every 24 hours for 7 days    Osteoporosis with current pathological fracture, unspecified osteoporosis type, sequela    Orders:  •  calcium carbonate (OS-KORI) 600 MG tablet; Take 1 tablet (600 mg total) by mouth daily    History of alcohol abuse  Patient no longer drinking.  Orders:  •  cyanocobalamin (VITAMIN B-12) 100 mcg tablet; Take 1 tablet (100 mcg total) by mouth daily  •  Vitamin D, Cholecalciferol, 25 MCG (1000 UT) TABS; Take 1 tablet (1,000 Units total) by mouth daily    Psychophysiological insomnia  Patient will Every 2 hours at night to pee, and then go back to sleep, and sleeping around 6 to 7 hours a day  Orders:  •  doxepin (SINEquan) 50 mg capsule; Take 1 capsule (50 mg total) by mouth daily at bedtime    Folic acid deficiency    Orders:  •  folic acid (FOLVITE) 1 mg tablet; Take 1 tablet (1,000 mcg total) by mouth daily    Seizures ([facility])    Orders:  •  levETIRAcetam (KEPPRA) 750 mg tablet; Take 1 tablet (750 mg total) by mouth every 12 (twelve) hours  •  Levetiracetam level; Future    Chronic hyponatremia  Stable sodium level, although consistently low, advised to see endocrinologist in 1 or 2 months.  Orders:  •  lisinopril (ZESTRIL) 2.5 mg tablet; Take 1 tablet (2.5 mg total) by mouth daily    Gastroesophageal reflux disease with esophagitis without hemorrhage  No Gastroflux symptoms, no stomach  pain with daily omeprazole.  Orders:  •  omeprazole (PriLOSEC) 40 MG capsule; Take 1 capsule (40 mg total) by mouth 2 (two) times a day    Lumbar spondylosis  History of rods placement       Lumbar degenerative disc disease         Spinal stenosis of lumbar region, unspecified whether neurogenic claudication present  Denies saddle anesthesia, denies weakness of the lower extremity, walking around with no problem.       Hyponatremia  Advised to see endocrinologist in the next 1 to 2 months.  Orders:  •  sodium chloride 1 g tablet; Take 3 tablets (3 g total) by mouth 2 (two) times a day    Alcoholic hepatitis without ascites  No ascites appreciated during physical exam, no longer drinking alcohol, last liver enzymes August 20, AST 61, ALT 24.  Orders:  •  thiamine (VITAMIN B1) 100 mg tablet; Take 1 tablet (100 mg total) by mouth daily    Essential hypertension  Blood pressure at 116/84 today, denies low blood pressure at home.       Celiac artery stenosis (HCC)  Does not see cardiologist, will address cardiac issues next visit.       Aortic ectasia (HCC)  See above       Hypotension, unspecified hypotension type  Denies hypotension       Duodenal ulcer  Denies stomach pain.       Traore's esophagus with dysplasia  Well-controlled       Fecal smearing  Now with new complaint of fecal smearing, not associated with movement or coughing, says to happens out of nowhere, happens given when sitting down talking, advised to have increase fiber intake, to take over-the-counter fiber supplements, increase fruits and vegetables in the diet, will reassess in 2 weeks.       Urinary retention  Patient waking up every 2 hours at night, to margie, prostate exam performed, appreciate enlarged prostate, Flomax ordered, will reassess in 2 weeks.  Orders:  •  tamsulosin (FLOMAX) 0.4 mg; Take 1 capsule (0.4 mg total) by mouth daily with dinner    Tobacco abuse  Patient used to smoke 4 packs a day, now smoking 1/2 packs a day, smoke  cessation medicine discussed grandparent patient refused, patient would like to stop smoking at his own pace, encouragement provided       Smoking greater than 20 pack years  CT ordered, patient agrees  Orders:  •  CT lung screening program; Future    Screening for prostate cancer    Orders:  •  PSA, Total Screen; Future    Encounter for immunization    Orders:  •  Zoster Vaccine Recombinant IM  •  influenza vaccine, recombinant, PF, 0.5 mL IM (Flublok)  •  Hepatitis A vaccine adult IM    Tobacco dependence  Will quit on is own pace.        Allergy, initial encounter  Complaint of allergy to dogs and cats, want allergy testing done.   Orders:  •  Ambulatory Referral to Allergy; Future  •  [facility] Allergy Panel, Adult    Benign prostatic hyperplasia with nocturia  Complaint of waking up to urinate every 2 hours, denies incontinence at day or night.   Orders:  •  tamsulosin (FLOMAX) 0.4 mg; Take 1 capsule (0.4 mg total) by mouth daily with dinner    Cerumen debris on tympanic membrane of right ear  Observed at the right.  Orders:  •  carbamide peroxide (DEBROX) 6.5 % otic solution; Administer 5 drops to the right ear 2 (two) times a day      Immunizations and preventive care screenings were discussed with patient today. Appropriate education was printed on patient's after visit summary.    Discussed risks and benefits of prostate cancer screening. We discussed the controversial history of PSA screening for prostate cancer in the [place] as well as the risk of over detection and over treatment of prostate cancer by way of PSA screening.  The patient understands that PSA blood testing is an imperfect way to screen for prostate cancer and that elevated PSA levels in the blood may also be caused by infection, inflammation, prostatic trauma or manipulation, urological procedures, or by benign prostatic enlargement.    The role of the digital rectal examination in prostate cancer screening was also discussed and I  discussed with him that there is large interobserver variability in the findings of digital rectal examination.    Counseling:  Alcohol/drug use: discussed moderation in alcohol intake, the recommendations for healthy alcohol use, and avoidance of illicit drug use.  Dental Health: discussed importance of regular tooth brushing, flossing, and dental visits.  Exercise: the importance of regular exercise/physical activity was discussed. Recommend exercise 3-5 times per week for at least 30 minutes.       Depression Screening and Follow-up Plan: Patient was screened for depression during today's encounter. They screened negative with a PHQ-9 score of 2.    Tobacco Cessation Counseling: Tobacco cessation counseling was provided. The patient is sincerely urged to quit consumption of tobacco. He is not ready to quit tobacco. Medication options and side effects of medication discussed. Patient refused medication.     Lung Cancer Screening Shared Decision Making: I discussed with him that he is a candidate for lung cancer CT screening.     The following Shared Decision-Making points were covered:  Benefits of screening were discussed, including the rates of reduction in death from lung cancer and other causes.  Harms of screening were reviewed, including false positive tests, radiation exposure levels, risks of invasive procedures, risks of complications of screening, and risk of overdiagnosis.  I counseled on the importance of adherence to annual lung cancer LDCT screening, impact of co-morbidities, and ability or willingness to undergo diagnosis and treatment.  I counseled on the importance of maintaining abstinence as a former smoker or was counseled on the importance of smoking cessation if a current smoker    Review of Eligibility Criteria: He meets all of the criteria for Lung Cancer Screening.   - He is 59 y.o.   - He has 20 pack year tobacco history and is a current smoker or has quit within the past 15 years  - He  presents no signs or symptoms of lung cancer    After discussion, the patient decided to elect lung cancer screening.        History of Present Illness     Adult Annual Physical:  Patient presents for annual physical. 58 yo pt with now 1 week of cold symptom, coughing, running nose, watering eyes, chest congestion, denies fever/chills. Feels getting worse by the day. .     Diet and Physical Activity:  - Diet/Nutrition: well balanced diet.  - Exercise: walking, 1-2 hours on average and 5-7 times a week on average.    Depression Screening:    - PHQ-9 Score: 2    General Health:  - Sleep: sleeps poorly, 4-6 hours of sleep on average and 7-8 hours of sleep on average. wake up to pee and go back to sleep  - Hearing: normal hearing right ear and normal hearing left ear.  - Vision: no vision problems.  - Dental: no dental visits for > 1 year.     Health:  - History of STDs: no.   - Urinary symptoms: none and nocturia.     Advanced Care Planning:  - Has an advanced directive?: no    - Has a durable medical POA?: no    - ACP document given to patient?: yes      Review of Systems   Constitutional:  Negative for chills and fever.   HENT:  Positive for congestion, rhinorrhea and sore throat (for 1 week). Negative for ear pain.    Eyes:  Negative for pain and visual disturbance.   Respiratory:  Positive for cough, shortness of breath and wheezing. Negative for chest tightness.    Cardiovascular:  Negative for chest pain, palpitations and leg swelling.   Gastrointestinal:  Negative for abdominal pain, blood in stool, constipation, diarrhea and vomiting.   Genitourinary:  Negative for dysuria and hematuria.   Musculoskeletal:  Positive for arthralgias and back pain.   Skin:  Negative for color change and rash.   Neurological:  Negative for seizures and syncope.   All other systems reviewed and are negative.    Pertinent Medical History         Medical History Reviewed by provider this encounter:     .  Past Medical History    Past Medical History:   Diagnosis Date   • Alcohol abuse    • Traore esophagus    • Bowel obstruction (HCC)    • Bowel perforation (HCC)    • Cardiac disease    • Continuous chronic alcoholism (HCC) 10/05/2017   • Continuous chronic alcoholism (HCC) 10/05/2017   • COPD (chronic obstructive pulmonary disease) (HCC)    • History of shoulder surgery     Right shoulder   • History of transfusion    • Hx of cervical spine surgery    • Hypertension    • Incisional hernia 10/08/2017   • MI, old    • Mitral regurgitation    • Psychiatric disorder    • Seizures (HCC)      Past Surgical History:   Procedure Laterality Date   • APPENDECTOMY     • BACK SURGERY     • CHOLECYSTECTOMY     • ESOPHAGOGASTRODUODENOSCOPY N/A 11/28/2016    Procedure: ESOPHAGOGASTRODUODENOSCOPY (EGD);  Surgeon: Darryl Monroy MD;  Location: [facility];  Service:    • GALLBLADDER SURGERY     • LAPAROTOMY N/A 10/25/2016    Procedure: LAPAROTOMY EXPLORATORY;  Surgeon: Renzo Harper MD;  Location: [facility];  Service:    • MOUTH SURGERY     • NERVE BLOCK Bilateral 2/15/2024    Procedure: Bilateral L4-5 and L5-S1 medial branch block #1;  Surgeon: Jonathan Garcia MD;  Location: [facility];  Service: Pain Management    • NERVE BLOCK Bilateral 3/7/2024    Procedure: BLOCK MEDIAL BRANCH B/L L4-5 and L5-S1 MBB#2;  Surgeon: Jonathan Garcia MD;  Location: [facility];  Service: Pain Management    • PERCUTANEOUS PINNING FEMORAL NECK FRACTURE     • RADIOFREQUENCY ABLATION Left 4/3/2024    Procedure: Left L4-L5 and L5-S1 radiofrequency ablation;  Surgeon: Jonathan Garcia MD;  Location: [facility];  Service: Pain Management    • RADIOFREQUENCY ABLATION Right 4/26/2024    Procedure: Right L4-L5 and L5-S1 radiofrequency ablation;  Surgeon: Jonathan Garcia MD;  Location: [facility];  Service: Pain Management    • SHOULDER SURGERY Right    • SHOULDER SURGERY     • SMALL INTESTINE SURGERY     • STOMACH SURGERY      bal surgery     Family History    Problem Relation Age of Onset   • Breast cancer Mother    • Prostate cancer Father    • Skin cancer Brother       reports that he has been smoking cigarettes. He has a 35 pack-year smoking history. He has never used smokeless tobacco. He reports that he does not currently use alcohol after a past usage of about 42.0 standard drinks of alcohol per week. He reports that he does not use drugs.  Current Outpatient Medications on File Prior to Visit   Medication Sig Dispense Refill   • pregabalin (LYRICA) 100 mg capsule Take 1 capsule (100 mg total) by mouth 3 (three) times a day 90 capsule 2   • [DISCONTINUED] albuterol (PROVENTIL HFA,VENTOLIN HFA) 90 mcg/act inhaler Inhale 2 puffs every 6 (six) hours as needed for wheezing 9 g 0   • [DISCONTINUED] calcium carbonate (OS-KORI) 600 MG tablet Take 1 tablet (600 mg total) by mouth daily 100 tablet 3   • [DISCONTINUED] cyanocobalamin (VITAMIN B-12) 100 mcg tablet Take 1 tablet by mouth once daily 90 tablet 0   • [DISCONTINUED] doxepin (SINEquan) 50 mg capsule Take 1 capsule by mouth once daily at bedtime 30 capsule 0   • [DISCONTINUED] folic acid (FOLVITE) 1 mg tablet Take 1 tablet (1,000 mcg total) by mouth daily 90 tablet 0   • [DISCONTINUED] levETIRAcetam (KEPPRA) 750 mg tablet Take 1 tablet (750 mg total) by mouth every 12 (twelve) hours 180 tablet 2   • [DISCONTINUED] lisinopril (ZESTRIL) 2.5 mg tablet Take 1 tablet (2.5 mg total) by mouth daily 90 tablet 0   • [DISCONTINUED] Multiple Vitamin (TAB-A-BONI PO) Take 1 tablet by mouth daily     • [DISCONTINUED] omeprazole (PriLOSEC) 40 MG capsule Take 1 capsule (40 mg total) by mouth 2 (two) times a day 180 capsule 0   • [DISCONTINUED] sodium chloride 1 g tablet TAKE 3 TABLETS BY MOUTH TWICE DAILY 240 tablet 0   • [DISCONTINUED] thiamine (VITAMIN B1) 100 mg tablet Take 1 tablet (100 mg total) by mouth daily 90 tablet 1   • [DISCONTINUED] thiamine 100 MG tablet Take 100 mg by mouth daily     • [DISCONTINUED]  umeclidinium-vilanterol (Anoro Ellipta) 62.5-25 mcg/actuation inhaler Inhale 1 puff by mouth once daily 180 each 1   • [DISCONTINUED] Vitamin D, Cholecalciferol, 25 MCG (1000 UT) TABS Take 1 tablet by mouth once daily 90 tablet 3   • [DISCONTINUED] Diclofenac Sodium (VOLTAREN) 1 % Apply 2 g topically 4 (four) times a day (Patient not taking: Reported on 1/7/2025) 350 g 2     No current facility-administered medications on file prior to visit.   No Known Allergies   Current Outpatient Medications on File Prior to Visit   Medication Sig Dispense Refill   • pregabalin (LYRICA) 100 mg capsule Take 1 capsule (100 mg total) by mouth 3 (three) times a day 90 capsule 2   • [DISCONTINUED] albuterol (PROVENTIL HFA,VENTOLIN HFA) 90 mcg/act inhaler Inhale 2 puffs every 6 (six) hours as needed for wheezing 9 g 0   • [DISCONTINUED] calcium carbonate (OS-KORI) 600 MG tablet Take 1 tablet (600 mg total) by mouth daily 100 tablet 3   • [DISCONTINUED] cyanocobalamin (VITAMIN B-12) 100 mcg tablet Take 1 tablet by mouth once daily 90 tablet 0   • [DISCONTINUED] doxepin (SINEquan) 50 mg capsule Take 1 capsule by mouth once daily at bedtime 30 capsule 0   • [DISCONTINUED] folic acid (FOLVITE) 1 mg tablet Take 1 tablet (1,000 mcg total) by mouth daily 90 tablet 0   • [DISCONTINUED] levETIRAcetam (KEPPRA) 750 mg tablet Take 1 tablet (750 mg total) by mouth every 12 (twelve) hours 180 tablet 2   • [DISCONTINUED] lisinopril (ZESTRIL) 2.5 mg tablet Take 1 tablet (2.5 mg total) by mouth daily 90 tablet 0   • [DISCONTINUED] Multiple Vitamin (TAB-A-BONI PO) Take 1 tablet by mouth daily     • [DISCONTINUED] omeprazole (PriLOSEC) 40 MG capsule Take 1 capsule (40 mg total) by mouth 2 (two) times a day 180 capsule 0   • [DISCONTINUED] sodium chloride 1 g tablet TAKE 3 TABLETS BY MOUTH TWICE DAILY 240 tablet 0   • [DISCONTINUED] thiamine (VITAMIN B1) 100 mg tablet Take 1 tablet (100 mg total) by mouth daily 90 tablet 1   • [DISCONTINUED] thiamine 100  "MG tablet Take 100 mg by mouth daily     • [DISCONTINUED] umeclidinium-vilanterol (Anoro Ellipta) 62.5-25 mcg/actuation inhaler Inhale 1 puff by mouth once daily 180 each 1   • [DISCONTINUED] Vitamin D, Cholecalciferol, 25 MCG (1000 UT) TABS Take 1 tablet by mouth once daily 90 tablet 3   • [DISCONTINUED] Diclofenac Sodium (VOLTAREN) 1 % Apply 2 g topically 4 (four) times a day (Patient not taking: Reported on 1/7/2025) 350 g 2     No current facility-administered medications on file prior to visit.      Social History     Tobacco Use   • Smoking status: Every Day     Current packs/day: 1.00     Average packs/day: 1 pack/day for 35.0 years (35.0 ttl pk-yrs)     Types: Cigarettes   • Smokeless tobacco: Never   Vaping Use   • Vaping status: Never Used   Substance and Sexual Activity   • Alcohol use: Not Currently     Alcohol/week: 42.0 standard drinks of alcohol     Types: 42 Cans of beer per week     Comment: no alcoholic drinks for 23 months - now non-alcoholic drinks   • Drug use: No   • Sexual activity: Not Currently     Partners: Female       Objective   /84   Pulse 78   Temp (!) 97.2 °F (36.2 °C)   Resp 18   Ht 5' 4\" (1.626 m)   Wt 55.8 kg (123 lb)   SpO2 95%   BMI 21.11 kg/m²     Physical Exam  Vitals and nursing note reviewed. Exam conducted with a chaperone present.   Constitutional:       General: He is not in acute distress.     Appearance: Normal appearance. He is well-developed.   HENT:      Head: Normocephalic and atraumatic.      Right Ear: External ear normal. There is impacted cerumen.      Left Ear: Tympanic membrane, ear canal and external ear normal.      Nose: Nose normal.      Mouth/Throat:      Mouth: Mucous membranes are moist.      Pharynx: Oropharynx is clear. Posterior oropharyngeal erythema present.      Comments: Cobblestoning   Eyes:      Extraocular Movements: Extraocular movements intact.      Conjunctiva/sclera: Conjunctivae normal.      Pupils: Pupils are equal, round, " and reactive to light.   Cardiovascular:      Rate and Rhythm: Normal rate and regular rhythm.      Pulses: Normal pulses.      Heart sounds: Normal heart sounds. No murmur heard.  Pulmonary:      Effort: Pulmonary effort is normal. No respiratory distress.      Breath sounds: Normal breath sounds.   Abdominal:      General: Abdomen is flat. Bowel sounds are normal. There is no distension.      Palpations: Abdomen is soft. There is no mass.      Tenderness: There is no abdominal tenderness. There is no guarding.   Genitourinary:     Rectum: Guaiac result negative.      Comments: Guaiac negative, prostate enlarged  Musculoskeletal:         General: No swelling or deformity. Normal range of motion.      Cervical back: Normal range of motion and neck supple. No rigidity.      Right lower leg: No edema.      Left lower leg: No edema.   Skin:     General: Skin is warm and dry.      Capillary Refill: Capillary refill takes less than 2 seconds.      Findings: No rash.   Neurological:      General: No focal deficit present.      Mental Status: He is alert. Mental status is at baseline.      Cranial Nerves: No cranial nerve deficit.      Sensory: No sensory deficit.      Coordination: Coordination normal.      Gait: Gait normal.   Psychiatric:         Mood and Affect: Mood normal.       Administrative Statements   I have spent a total time of 60 minutes in caring for this patient on the day of the visit/encounter including Impressions and Communicating with other healthcare professionals .

## 2025-01-07 NOTE — ASSESSMENT & PLAN NOTE
Stable sodium level, although consistently low, advised to see endocrinologist in 1 or 2 months.  Orders:  •  lisinopril (ZESTRIL) 2.5 mg tablet; Take 1 tablet (2.5 mg total) by mouth daily

## 2025-01-07 NOTE — ASSESSMENT & PLAN NOTE
Patient used to smoke 4 packs a day, now smoking 1/2 packs a day, smoke cessation medicine discussed grandparent patient refused, patient would like to stop smoking at his own pace, encouragement provided

## 2025-01-07 NOTE — ASSESSMENT & PLAN NOTE
Orders:  •  calcium carbonate (OS-KORI) 600 MG tablet; Take 1 tablet (600 mg total) by mouth daily

## 2025-01-07 NOTE — ASSESSMENT & PLAN NOTE
Orders:  •  levETIRAcetam (KEPPRA) 750 mg tablet; Take 1 tablet (750 mg total) by mouth every 12 (twelve) hours  •  Levetiracetam level; Future

## 2025-01-07 NOTE — ASSESSMENT & PLAN NOTE
Patient no longer drinking.  Orders:  •  cyanocobalamin (VITAMIN B-12) 100 mcg tablet; Take 1 tablet (100 mcg total) by mouth daily  •  Vitamin D, Cholecalciferol, 25 MCG (1000 UT) TABS; Take 1 tablet (1,000 Units total) by mouth daily

## 2025-01-07 NOTE — ASSESSMENT & PLAN NOTE
Advised to see endocrinologist in the next 1 to 2 months.  Orders:  •  sodium chloride 1 g tablet; Take 3 tablets (3 g total) by mouth 2 (two) times a day

## 2025-01-07 NOTE — ASSESSMENT & PLAN NOTE
Now with new complaint of fecal smearing, not associated with movement or coughing, says to happens out of nowhere, happens given when sitting down talking, advised to have increase fiber intake, to take over-the-counter fiber supplements, increase fruits and vegetables in the diet, will reassess in 2 weeks.

## 2025-01-07 NOTE — ASSESSMENT & PLAN NOTE
No Gastroflux symptoms, no stomach pain with daily omeprazole.  Orders:  •  omeprazole (PriLOSEC) 40 MG capsule; Take 1 capsule (40 mg total) by mouth 2 (two) times a day

## 2025-01-08 ENCOUNTER — DOCUMENTATION (OUTPATIENT)
Dept: FAMILY MEDICINE CLINIC | Facility: CLINIC | Age: 60
End: 2025-01-08

## 2025-01-08 NOTE — RESULT ENCOUNTER NOTE
Hi, everything looks great except for sodium, which is low but better then before. We can discuss on reasons of hyponatremia next time or you can see a endocrinologist. Let me know what you think.    PSA normal  Lipids normal  Keppra level normal  CBC normal

## 2025-01-09 ENCOUNTER — RESULTS FOLLOW-UP (OUTPATIENT)
Dept: ENDOCRINOLOGY | Facility: CLINIC | Age: 60
End: 2025-01-09

## 2025-01-13 DIAGNOSIS — M48.061 SPINAL STENOSIS OF LUMBAR REGION, UNSPECIFIED WHETHER NEUROGENIC CLAUDICATION PRESENT: ICD-10-CM

## 2025-01-13 DIAGNOSIS — M51.369 LUMBAR DEGENERATIVE DISC DISEASE: ICD-10-CM

## 2025-01-13 DIAGNOSIS — M47.816 LUMBAR SPONDYLOSIS: ICD-10-CM

## 2025-01-13 RX ORDER — PREGABALIN 100 MG/1
100 CAPSULE ORAL 3 TIMES DAILY
Qty: 90 CAPSULE | Refills: 0 | Status: CANCELLED | OUTPATIENT
Start: 2025-01-13 | End: 2025-04-13

## 2025-01-28 ENCOUNTER — HOSPITAL ENCOUNTER (EMERGENCY)
Facility: HOSPITAL | Age: 60
Discharge: HOME | End: 2025-01-28
Attending: EMERGENCY MEDICINE | Admitting: EMERGENCY MEDICINE
Payer: COMMERCIAL

## 2025-01-28 ENCOUNTER — APPOINTMENT (EMERGENCY)
Dept: CT IMAGING | Facility: HOSPITAL | Age: 60
End: 2025-01-28
Payer: COMMERCIAL

## 2025-01-28 VITALS
WEIGHT: 123.68 LBS | OXYGEN SATURATION: 94 % | DIASTOLIC BLOOD PRESSURE: 82 MMHG | TEMPERATURE: 97.4 F | RESPIRATION RATE: 18 BRPM | SYSTOLIC BLOOD PRESSURE: 132 MMHG | BODY MASS INDEX: 21.23 KG/M2 | HEART RATE: 74 BPM

## 2025-01-28 DIAGNOSIS — R07.81 RIB PAIN ON RIGHT SIDE: Primary | ICD-10-CM

## 2025-01-28 PROCEDURE — 71250 CT THORAX DX C-: CPT

## 2025-01-28 PROCEDURE — 93005 ELECTROCARDIOGRAM TRACING: CPT

## 2025-01-28 PROCEDURE — 99285 EMERGENCY DEPT VISIT HI MDM: CPT | Performed by: EMERGENCY MEDICINE

## 2025-01-28 PROCEDURE — 99284 EMERGENCY DEPT VISIT MOD MDM: CPT

## 2025-01-28 RX ORDER — LIDOCAINE 50 MG/G
1 PATCH TOPICAL ONCE
Status: DISCONTINUED | OUTPATIENT
Start: 2025-01-28 | End: 2025-01-28 | Stop reason: HOSPADM

## 2025-01-28 RX ADMIN — LIDOCAINE 1 PATCH: 50 PATCH TOPICAL at 19:31

## 2025-01-28 RX ADMIN — Medication 2.5 MG: at 19:30

## 2025-01-29 LAB
ATRIAL RATE: 79 BPM
P AXIS: 68 DEGREES
PR INTERVAL: 164 MS
QRS AXIS: -42 DEGREES
QRSD INTERVAL: 102 MS
QT INTERVAL: 400 MS
QTC INTERVAL: 458 MS
T WAVE AXIS: 40 DEGREES
VENTRICULAR RATE: 79 BPM

## 2025-01-29 PROCEDURE — 93010 ELECTROCARDIOGRAM REPORT: CPT | Performed by: INTERNAL MEDICINE
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
## 2025-01-29 NOTE — DISCHARGE INSTRUCTIONS
"Patient Education     Muscle and bone pain - Discharge instructions   The Basics   Written by the doctors and editors at [facility]   What are discharge instructions? -- Discharge instructions are information about how to take care of yourself after getting medical care for a health problem.  What are muscle and bone pain? -- Muscle and bone pain are common symptoms. But it can be hard for doctors to tell exactly where the pain is coming from. You might hear this kind of pain called \"musculoskeletal\" pain.   Muscle pain is more common than bone pain. It often happens when you are using a muscle and gets better when you rest. The pain might come and go with activity. Muscle pain can be in just 1 area, over a larger part of your body, or in your whole body.   Bone pain can often be felt even when you are not using that part your body. It is often felt deeper within the body and can last a longer time. With bone pain, it might be easier to point to a specific area that hurts.  Lots of different things can cause these types of pain. For example, an injury can cause muscle or bone pain that happens suddenly. Chronic illnesses like arthritis can cause pain that gets worse over time. Many different health problems can cause muscle or bone pain, too.  How do I care for myself at home? -- Ask the doctor or nurse what you should do when you go home. Make sure that you understand exactly what you need to do to care for yourself. Ask questions if there is anything you do not understand.  Your care and treatment will depend on the likely cause of your pain. For example, your doctor or nurse might suggest that you:   Avoid or stop activities that causes you pain. Some kinds of muscle pain are caused by using a muscle in the same way over and over. You might need to stop or limit this activity to let your body heal.   Take non-prescription medicines to relieve pain. Examples include acetaminophen (sample brand name: Tylenol), " ibuprofen (sample brand names: Advil, Motrin), or naproxen (sample brand name: Aleve).   Use a splint, brace, or elastic bandage to let the injured area rest and heal.   Use ice or heat to help with pain:   Ice - Put a cold gel pack, bag of ice, or bag of frozen vegetables on the painful area every 1 to 2 hours, for 15 minutes each time. Put a thin towel between the ice (or another cold object) and your skin.   Heat - If it helps, use heat on the painful area for short periods of time. Put a heating pad (on the low setting) on the area for 20 minutes at a time a few times each day. Never go to sleep with a heating pad on as this can cause burns.   Do exercises to stretch and strengthen your muscles. They might also suggest improving your posture and form. This can limit the stress and strain on your bones and muscles.  What follow-up care do I need? -- Your doctor or nurse will tell you if you need to make a follow-up appointment. If so, make sure that you know when and where to go.  When should I call the doctor? -- Call for advice if:   You have a fever of 100.4°F (38°C) or higher, or chills.   Your pain is not relieved by non-prescription medicines.   You have trouble doing your daily tasks.   You are still having pain in 2 weeks.  All topics are updated as new evidence becomes available and our peer review process is complete.  This topic retrieved from [employer] on: Mar 13, 2024.  Topic 954788 Version 1.0  Release: 32.2.4 - C32.71  © 2024 UpToDate, Inc. and/or its affiliates. All rights reserved.  Consumer Information Use and Disclaimer   Disclaimer: This generalized information is a limited summary of diagnosis, treatment, and/or medication information. It is not meant to be comprehensive and should be used as a tool to help the user understand and/or assess potential diagnostic and treatment options. It does NOT include all information about conditions, treatments, medications, side effects, or risks that may  apply to a specific patient. It is not intended to be medical advice or a substitute for the medical advice, diagnosis, or treatment of a health care provider based on the health care provider's examination and assessment of a patient's specific and unique circumstances. Patients must speak with a health care provider for complete information about their health, medical questions, and treatment options, including any risks or benefits regarding use of medications. This information does not endorse any treatments or medications as safe, effective, or approved for treating a specific patient. UpToDate, Inc. and its affiliates disclaim any warranty or liability relating to this information or the use thereof.The use of this information is governed by the Terms of Use, available at https://www.[employer].com/en/know/clinical-effectiveness-terms. 2024© UpToDate, Inc. and its affiliates and/or licensors. All rights reserved.  Copyright   © 2024 UpToDate, Inc. and/or its affiliates. All rights reserved.

## 2025-02-01 DIAGNOSIS — F51.04 PSYCHOPHYSIOLOGICAL INSOMNIA: ICD-10-CM

## 2025-02-03 RX ORDER — DOXEPIN HYDROCHLORIDE 50 MG/1
50 CAPSULE ORAL
Qty: 30 CAPSULE | Refills: 0 | Status: SHIPPED | OUTPATIENT
Start: 2025-02-03

## 2025-02-10 DIAGNOSIS — E87.1 HYPONATREMIA: ICD-10-CM

## 2025-02-10 RX ORDER — SODIUM CHLORIDE 1 G/1
3 TABLET ORAL 2 TIMES DAILY
Qty: 240 TABLET | Refills: 0 | Status: SHIPPED | OUTPATIENT
Start: 2025-02-10

## 2025-02-17 ENCOUNTER — PREP FOR PROCEDURE (OUTPATIENT)
Dept: PAIN MEDICINE | Facility: CLINIC | Age: 60
End: 2025-02-17

## 2025-02-17 ENCOUNTER — OFFICE VISIT (OUTPATIENT)
Dept: PAIN MEDICINE | Facility: CLINIC | Age: 60
End: 2025-02-17
Payer: COMMERCIAL

## 2025-02-17 VITALS
WEIGHT: 123 LBS | HEIGHT: 64 IN | RESPIRATION RATE: 16 BRPM | BODY MASS INDEX: 21 KG/M2 | OXYGEN SATURATION: 96 % | HEART RATE: 89 BPM | TEMPERATURE: 97.3 F

## 2025-02-17 DIAGNOSIS — M48.061 SPINAL STENOSIS OF LUMBAR REGION, UNSPECIFIED WHETHER NEUROGENIC CLAUDICATION PRESENT: ICD-10-CM

## 2025-02-17 DIAGNOSIS — M51.369 LUMBAR DEGENERATIVE DISC DISEASE: ICD-10-CM

## 2025-02-17 DIAGNOSIS — M47.816 LUMBAR SPONDYLOSIS: ICD-10-CM

## 2025-02-17 DIAGNOSIS — M47.816 LUMBAR SPONDYLOSIS: Primary | ICD-10-CM

## 2025-02-17 PROCEDURE — 99214 OFFICE O/P EST MOD 30 MIN: CPT | Performed by: ANESTHESIOLOGY

## 2025-02-17 RX ORDER — PREGABALIN 100 MG/1
100 CAPSULE ORAL 3 TIMES DAILY
Qty: 90 CAPSULE | Refills: 2 | Status: SHIPPED | OUTPATIENT
Start: 2025-02-17 | End: 2025-05-18

## 2025-02-17 NOTE — PROGRESS NOTES
Assessment:  1. Lumbar spondylosis    2. Lumbar degenerative disc disease    3. Spinal stenosis of lumbar region, unspecified whether neurogenic claudication present        Plan:  Patient is a 59-year-old male with complaints of back pain leg pain afebrile pain with chronic patient secondary to lumbar spondylosis, peripheral neuropathy presents to office for follow-up visit.  Patient reports reemergence of sharp and stabbing low back pain.  Patient last had L4-L5 and L5-S1 radiofrequency ablations on 4/3/2024 and 4/26/2024.  Patient notes decreased range of motion and low back pain.  1.  We will schedule patient left L4-5 and L5-S1 radiofrequency ablation  2.  Will schedule patient for a right L4-L5 and L5-S1 Refixia ablation  3.  We will refill Lyrica 100 mg p.o. 3 times daily  4.  Follow-up in 3 months    Complete risks and benefits including bleeding, infection, tissue reaction, nerve injury and allergic reaction were discussed. The approach was demonstrated using models and literature was provided. Verbal and written consent was obtained.        History of Present Illness:  The patient is a 59 y.o. male who presents for a follow up office visit in regards to Back Pain.   The patient’s current symptoms include 8 out of 10 sharp, shooting pain without particular time pattern.    Current pain medications includes: Lyrica 100 mg p.o. 3 times daily.  The patient reports that this regimen is providing 30% pain relief.  The patient is reporting no side effects from this pain medication regimen.    I have personally reviewed and/or updated the patient's past medical history, past surgical history, family history, social history, current medications, allergies, and vital signs today.         Review of Systems  Review of Systems   Musculoskeletal:  Positive for back pain.        Decreased ROM  Joint stiffness     All other systems reviewed and are negative.          Past Medical History:   Diagnosis Date    Alcohol abuse      Traore esophagus     Bowel obstruction (HCC)     Bowel perforation (HCC)     Cardiac disease     Continuous chronic alcoholism (HCC) 10/05/2017    Continuous chronic alcoholism (HCC) 10/05/2017    COPD (chronic obstructive pulmonary disease) (HCC)     History of shoulder surgery     Right shoulder    History of transfusion     Hx of cervical spine surgery     Hypertension     Incisional hernia 10/08/2017    MI, old     Mitral regurgitation     Psychiatric disorder     Seizures (HCC)        Past Surgical History:   Procedure Laterality Date    APPENDECTOMY      BACK SURGERY      CHOLECYSTECTOMY      ESOPHAGOGASTRODUODENOSCOPY N/A 11/28/2016    Procedure: ESOPHAGOGASTRODUODENOSCOPY (EGD);  Surgeon: Darryl Monroy MD;  Location: [facility];  Service:     GALLBLADDER SURGERY      LAPAROTOMY N/A 10/25/2016    Procedure: LAPAROTOMY EXPLORATORY;  Surgeon: Renzo Harper MD;  Location: [facility];  Service:     MOUTH SURGERY      NERVE BLOCK Bilateral 2/15/2024    Procedure: Bilateral L4-5 and L5-S1 medial branch block #1;  Surgeon: Jonathan Garcia MD;  Location: [facility];  Service: Pain Management     NERVE BLOCK Bilateral 3/7/2024    Procedure: BLOCK MEDIAL BRANCH B/L L4-5 and L5-S1 MBB#2;  Surgeon: Jonathan Garcia MD;  Location: [facility];  Service: Pain Management     PERCUTANEOUS PINNING FEMORAL NECK FRACTURE      RADIOFREQUENCY ABLATION Left 4/3/2024    Procedure: Left L4-L5 and L5-S1 radiofrequency ablation;  Surgeon: Jonathan Garcia MD;  Location: [facility];  Service: Pain Management     RADIOFREQUENCY ABLATION Right 4/26/2024    Procedure: Right L4-L5 and L5-S1 radiofrequency ablation;  Surgeon: Jonathan Garcia MD;  Location: [facility];  Service: Pain Management     SHOULDER SURGERY Right     SHOULDER SURGERY      SMALL INTESTINE SURGERY      STOMACH SURGERY      bal surgery       Family History   Problem Relation Age of Onset    Breast cancer Mother     Prostate cancer Father      Skin cancer Brother        Social History     Occupational History    Not on file   Tobacco Use    Smoking status: Every Day     Current packs/day: 1.00     Average packs/day: 1 pack/day for 35.0 years (35.0 ttl pk-yrs)     Types: Cigarettes    Smokeless tobacco: Never   Vaping Use    Vaping status: Never Used   Substance and Sexual Activity    Alcohol use: Not Currently     Alcohol/week: 42.0 standard drinks of alcohol     Types: 42 Cans of beer per week    Drug use: No    Sexual activity: Not Currently     Partners: Female         Current Outpatient Medications:     albuterol (PROVENTIL HFA,VENTOLIN HFA) 90 mcg/act inhaler, Inhale 2 puffs every 6 (six) hours as needed for wheezing, Disp: 9 g, Rfl: 0    calcium carbonate (OS-KORI) 600 MG tablet, Take 1 tablet (600 mg total) by mouth daily, Disp: 100 tablet, Rfl: 3    carbamide peroxide (DEBROX) 6.5 % otic solution, Administer 5 drops to the right ear 2 (two) times a day, Disp: 15 mL, Rfl: 0    cyanocobalamin (VITAMIN B-12) 100 mcg tablet, Take 1 tablet (100 mcg total) by mouth daily, Disp: 90 tablet, Rfl: 0    doxepin (SINEquan) 50 mg capsule, Take 1 capsule by mouth once daily at bedtime, Disp: 30 capsule, Rfl: 0    folic acid (FOLVITE) 1 mg tablet, Take 1 tablet (1,000 mcg total) by mouth daily, Disp: 90 tablet, Rfl: 0    levETIRAcetam (KEPPRA) 750 mg tablet, Take 1 tablet (750 mg total) by mouth every 12 (twelve) hours, Disp: 180 tablet, Rfl: 2    lisinopril (ZESTRIL) 2.5 mg tablet, Take 1 tablet (2.5 mg total) by mouth daily, Disp: 90 tablet, Rfl: 0    Multiple Vitamin (Tab-A-Rosa) TABS, Take 1 tablet by mouth daily, Disp: 30 tablet, Rfl: 6    omeprazole (PriLOSEC) 40 MG capsule, Take 1 capsule (40 mg total) by mouth 2 (two) times a day, Disp: 180 capsule, Rfl: 0    pregabalin (LYRICA) 100 mg capsule, Take 1 capsule (100 mg total) by mouth 3 (three) times a day, Disp: 90 capsule, Rfl: 2    sodium chloride 1 g tablet, TAKE 3 TABLETS BY MOUTH TWICE DAILY, Disp:  "240 tablet, Rfl: 0    tamsulosin (FLOMAX) 0.4 mg, Take 1 capsule (0.4 mg total) by mouth daily with dinner, Disp: 30 capsule, Rfl: 0    thiamine (VITAMIN B1) 100 mg tablet, Take 1 tablet (100 mg total) by mouth daily, Disp: 90 tablet, Rfl: 1    umeclidinium-vilanterol (Anoro Ellipta) 62.5-25 mcg/actuation inhaler, Inhale 1 puff daily, Disp: 180 each, Rfl: 1    Vitamin D, Cholecalciferol, 25 MCG (1000 UT) TABS, Take 1 tablet (1,000 Units total) by mouth daily, Disp: 90 tablet, Rfl: 3    No Known Allergies    Physical Exam:    Pulse 89   Temp (!) 97.3 °F (36.3 °C)   Resp 16   Ht 5' 4\" (1.626 m)   Wt 55.8 kg (123 lb)   SpO2 96%   BMI 21.11 kg/m²     Constitutional:normal, well developed, well nourished, alert, in no distress and non-toxic and no overt pain behavior.  Eyes:anicteric  HEENT:grossly intact  Neck:supple, symmetric, trachea midline and no masses   Pulmonary:even and unlabored  Cardiovascular:No edema or pitting edema present  Skin:Normal without rashes or lesions and well hydrated  Psychiatric:Mood and affect appropriate  Neurologic:Cranial Nerves II-XII grossly intact  Musculoskeletal:antalgic    Lumbar/Sacral Spine examination demonstrates.  Decreased range of motion lumbar spine with pain upon: flexion, lateral rotation to the left/right, and bending to the left/right.  Bilateral lumbar paraspinals tender to palpation. Muscle spasms noted in the lumbar area bilaterally. 4/5 lower extremity strength in all muscle groups bilaterally. Positive seated straight leg raise for bilateral lower extremities.  Sensitivity to light touch intact bilateral lower extremities. 2+ reflexes in the patella and Achilles.  No ankle clonus    Imaging    Study Result    Narrative & Impression   LUMBAR SPINE     INDICATION:   R52: Pain, unspecified.     COMPARISON: 5/9/2023     VIEWS:  XR SPINE LUMBAR 2 OR 3 VIEWS INJURY        FINDINGS:     There are 5 non rib bearing lumbar vertebral bodies.     Mild L3 compression " deformity.     Alignment is unremarkable without dynamic instability.     Age-appropriate lumbar degenerative changes are seen.     The pedicles appear intact.     There are atherosclerotic calcifications. Soft tissues are otherwise unremarkable.     IMPRESSION:  Mild L3 compression deformity. Correlate with clinical exam. Degenerative changes as described.     The study was marked in EPIC for significant notification.     Workstation performed: [id number]           No orders to display       No orders of the defined types were placed in this encounter.

## 2025-02-17 NOTE — PROGRESS NOTES
Assessment:  1. Lumbar spondylosis    2. Cervical radiculopathy    3. Radiculopathy, cervical region    4. Lumbar degenerative disc disease    5. Spinal stenosis of lumbar region, unspecified whether neurogenic claudication present        Plan:  Patient is a 58-year-old male complains of arm pain, low back pain, leg pain with chronic pain syndrome secondary to cervical radiculopathy status post ACDF, lumbar radiculopathy presents to office for follow-up visit.  Patient reports he no longer needs opioid pain regimen.  At this time we will dissolve opioid contract we will continue with help with neuropathic pain.  1.  We will discontinue oxycodone 5 mg p.o. 4 times daily  2.  We will continue Lyrica 100 mg p.o. 3 times daily  3.  We will schedule patient for a bilateral L4-5 and L5-S1 medial branch block #1  4.  We will provide patient physical therapy for cervical, thoracic and lumbar core strengthening exercises  5.  We will follow-up in 2 months         History of Present Illness:  The patient is a 58 y.o. male who presents for a follow up office visit in regards to Back Pain and Neck Pain.   The patient’s current symptoms include 7/10 sharp pain in the neck and low back which is worse at nighttime.    Current pain medications includes: Lyrica 100 mg.  The patient reports that this regimen is providing 20% pain relief.  The patient is reporting no side effects from this pain medication regimen.    I have personally reviewed and/or updated the patient's past medical history, past surgical history, family history, social history, current medications, allergies, and vital signs today.         Review of Systems  Review of Systems   Musculoskeletal:  Positive for back pain, myalgias and neck pain.        Decreased ROM  Joint stiffness   All other systems reviewed and are negative.          Past Medical History:   Diagnosis Date    Alcohol abuse     Traore esophagus     Bowel obstruction (HCC)     Bowel perforation  Patient notified meds were sent on 2/15/25   (HCC)     Cardiac disease     Continuous chronic alcoholism (HCC) 10/5/2017    COPD (chronic obstructive pulmonary disease) (HCC)     History of shoulder surgery     Right shoulder    History of transfusion     Hx of cervical spine surgery     Hypertension     Incisional hernia 10/8/2017    MI, old     Mitral regurgitation     Psychiatric disorder     Seizures (HCC)        Past Surgical History:   Procedure Laterality Date    APPENDECTOMY      BACK SURGERY      CHOLECYSTECTOMY      ESOPHAGOGASTRODUODENOSCOPY N/A 11/28/2016    Procedure: ESOPHAGOGASTRODUODENOSCOPY (EGD);  Surgeon: Darryl Monroy MD;  Location: [facility];  Service:     GALLBLADDER SURGERY      LAPAROTOMY N/A 10/25/2016    Procedure: LAPAROTOMY EXPLORATORY;  Surgeon: Renzo Harper MD;  Location: [facility];  Service:     MOUTH SURGERY      PERCUTANEOUS PINNING FEMORAL NECK FRACTURE      SHOULDER SURGERY Right     SHOULDER SURGERY      SMALL INTESTINE SURGERY      STOMACH SURGERY      bal surgery       Family History   Problem Relation Age of Onset    Breast cancer Mother     Prostate cancer Father     Skin cancer Brother        Social History     Occupational History    Not on file   Tobacco Use    Smoking status: Every Day     Current packs/day: 1.00     Average packs/day: 1 pack/day for 35.0 years (35.0 ttl pk-yrs)     Types: Cigarettes    Smokeless tobacco: Never   Vaping Use    Vaping status: Never Used   Substance and Sexual Activity    Alcohol use: Not Currently     Alcohol/week: 42.0 standard drinks of alcohol     Types: 42 Cans of beer per week     Comment: no drinks for 16 months    Drug use: No    Sexual activity: Not Currently     Partners: Female         Current Outpatient Medications:     albuterol (PROVENTIL HFA,VENTOLIN HFA) 90 mcg/act inhaler, Inhale 2 puffs every 6 (six) hours as needed for wheezing, Disp: 9 g, Rfl: 0    calcium carbonate (OS-KORI) 600 MG tablet, Take 1 tablet (600 mg total) by mouth daily, Disp: 90 tablet, Rfl:  "3    Cholecalciferol 25 MCG (1000 UT) tablet, Take 1 tablet (1,000 Units total) by mouth daily, Disp: 90 tablet, Rfl: 2    cyanocobalamin (VITAMIN B-12) 100 mcg tablet, Take 1 tablet (100 mcg total) by mouth daily, Disp: 90 tablet, Rfl: 2    Diclofenac Sodium (VOLTAREN) 1 %, Apply 2 g topically 4 (four) times a day, Disp: 350 g, Rfl: 2    doxepin (SINEquan) 50 mg capsule, Take 1 capsule (50 mg total) by mouth daily at bedtime, Disp: 90 capsule, Rfl: 2    folic acid (FOLVITE) 1 mg tablet, Take 1 tablet (1,000 mcg total) by mouth daily, Disp: 90 tablet, Rfl: 1    levETIRAcetam (KEPPRA) 750 mg tablet, Take 1 tablet (750 mg total) by mouth every 12 (twelve) hours, Disp: 180 tablet, Rfl: 2    lisinopril (ZESTRIL) 2.5 mg tablet, Take 1 tablet (2.5 mg total) by mouth daily, Disp: 90 tablet, Rfl: 2    Multiple Vitamin (TAB-A-BONI PO), Take 1 tablet by mouth daily, Disp: , Rfl:     omeprazole (PriLOSEC) 40 MG capsule, Take 1 capsule (40 mg total) by mouth 2 (two) times a day, Disp: 180 capsule, Rfl: 1    pregabalin (LYRICA) 100 mg capsule, Take 1 capsule (100 mg total) by mouth 3 (three) times a day, Disp: 90 capsule, Rfl: 0    sodium chloride 1 g tablet, Take 2 tablets (2 g total) by mouth 3 (three) times a day with meals, Disp: 240 tablet, Rfl: 3    thiamine (VITAMIN B1) 100 mg tablet, Take 1 tablet (100 mg total) by mouth daily, Disp: 90 tablet, Rfl: 1    thiamine 100 MG tablet, Take 100 mg by mouth daily, Disp: , Rfl:     umeclidinium-vilanterol (Anoro Ellipta) 62.5-25 mcg/actuation inhaler, Inhale 1 puff daily, Disp: 180 blister, Rfl: 2    No Known Allergies    Physical Exam:    /98   Pulse 85   Resp 16   Ht 5' 4\" (1.626 m)   Wt 63 kg (139 lb)   BMI 23.86 kg/m²     Constitutional:normal, well developed, well nourished, alert, in no distress and non-toxic and no overt pain behavior.  Eyes:anicteric  HEENT:grossly intact  Neck:supple, symmetric, trachea midline and no masses   Pulmonary:even and " unlabored  Cardiovascular:No edema or pitting edema present  Skin:Normal without rashes or lesions and well hydrated  Psychiatric:Mood and affect appropriate  Neurologic:Cranial Nerves II-XII grossly intact  Musculoskeletal:normal      Imaging      Study Result    Narrative & Impression   MRI CERVICAL SPINE WITHOUT CONTRAST     INDICATION: M54.12: Radiculopathy, cervical region  Z98.1: Arthrodesis status.     COMPARISON: 8/12/2014 and CT from 5/20/2022     TECHNIQUE:  Multiplanar, multisequence imaging of the cervical spine was performed. .        IMAGE QUALITY:  Diagnostic     FINDINGS:     ALIGNMENT: The patient is status post anterior cervical discectomy and spinal fusion from C5-C7. Patient is also status post posterior spinal fusion from C4-T1. There is trace anterolisthesis at C7-T1.     MARROW SIGNAL:  Normal marrow signal is identified within the visualized bony structures.  No discrete marrow lesion.     CERVICAL AND VISUALIZED THORACIC CORD:  Normal signal within the visualized cord.     PREVERTEBRAL AND PARASPINAL SOFT TISSUES:  Normal.     VISUALIZED POSTERIOR FOSSA:  The visualized posterior fossa demonstrates no abnormal signal.     CERVICAL DISC SPACES:     C2-C3: There is a disc osteophyte complex with uncovertebral spurring. There is hypertrophy of the posterior arch. There is moderate canal stenosis with contact of the cord. There is mild to moderate foraminal narrowing bilaterally.     C3-C4: There is a disc osteophyte complex with uncovertebral spurring. There is hypertrophy of the posterior arch. There is mild to moderate canal stenosis. There is mild to moderate foraminal narrowing.     C4-C5: Canal is decompressed by laminectomy. No significant foraminal narrowing.     C5-C6: Canal is decompressed by laminectomy. No significant foraminal narrowing.     C6-C7: Canal is decompressed by laminectomy. No significant foraminal narrowing.     C7-T1: No significant canal stenosis or foraminal  narrowing. Canal is decompressed by laminectomy.     UPPER THORACIC DISC SPACES:  Normal.     OTHER FINDINGS:  None.     IMPRESSION:     Postoperative changes and multilevel cervical spondylosis, as described above.     Interval development of cervical spondylosis at C2-C3 and C3-C4 since MRI from 8/12/2014 as described above. No intrinsic cord signal abnormality seen.        Narrative & Impression   LUMBAR SPINE     INDICATION:   R52: Pain, unspecified.     COMPARISON: 5/9/2023     VIEWS:  XR SPINE LUMBAR 2 OR 3 VIEWS INJURY        FINDINGS:     There are 5 non rib bearing lumbar vertebral bodies.     Mild L3 compression deformity.     Alignment is unremarkable without dynamic instability.     Age-appropriate lumbar degenerative changes are seen.     The pedicles appear intact.     There are atherosclerotic calcifications. Soft tissues are otherwise unremarkable.     IMPRESSION:  Mild L3 compression deformity. Correlate with clinical exam. Degenerative changes as described.     The study was marked in EPIC for significant notification.     Workstation performed: [id number]     No orders to display       No orders of the defined types were placed in this encounter.

## 2025-02-21 DIAGNOSIS — E87.1 CHRONIC HYPONATREMIA: ICD-10-CM

## 2025-02-21 NOTE — TELEPHONE ENCOUNTER
Received following message from patient. Patient requesting for 90-day supply refill for Lisinopril . Yes, my name is Cristofer Wiley. Date of birth is [birth year]. I need a refill on my lisinopril and I could if you can give me a three month supply. If not just a one month and it's [facility] and tomorrow. No thank you. Have a wonderful day.

## 2025-02-22 RX ORDER — LISINOPRIL 2.5 MG/1
2.5 TABLET ORAL DAILY
Qty: 90 TABLET | Refills: 0 | Status: SHIPPED | OUTPATIENT
Start: 2025-02-22

## 2025-03-05 DIAGNOSIS — E87.1 CHRONIC HYPONATREMIA: ICD-10-CM

## 2025-03-05 DIAGNOSIS — F51.04 PSYCHOPHYSIOLOGICAL INSOMNIA: ICD-10-CM

## 2025-03-06 RX ORDER — LISINOPRIL 2.5 MG/1
2.5 TABLET ORAL DAILY
Qty: 90 TABLET | Refills: 0 | Status: SHIPPED | OUTPATIENT
Start: 2025-03-06

## 2025-03-06 RX ORDER — DOXEPIN HYDROCHLORIDE 50 MG/1
50 CAPSULE ORAL
Qty: 30 CAPSULE | Refills: 0 | Status: SHIPPED | OUTPATIENT
Start: 2025-03-06

## 2025-03-16 ENCOUNTER — TELEPHONE (OUTPATIENT)
Dept: OTHER | Facility: OTHER | Age: 60
End: 2025-03-16

## 2025-03-16 NOTE — TELEPHONE ENCOUNTER
Patient called regarding surgery time for his scheduled ablation. Patient thought the ablation was scheduled for tomorrow 3/17 and was concerned that he had not had a call regarding the start time. Chart review revealed surgery is scheduled for 4/17. Patient advised and expressed understanding of his surgery date.

## 2025-04-02 DIAGNOSIS — K21.00 GASTROESOPHAGEAL REFLUX DISEASE WITH ESOPHAGITIS WITHOUT HEMORRHAGE: ICD-10-CM

## 2025-04-02 DIAGNOSIS — E53.8 FOLIC ACID DEFICIENCY: ICD-10-CM

## 2025-04-02 RX ORDER — OMEPRAZOLE 40 MG/1
40 CAPSULE, DELAYED RELEASE ORAL 2 TIMES DAILY
Qty: 180 CAPSULE | Refills: 0 | Status: SHIPPED | OUTPATIENT
Start: 2025-04-02

## 2025-04-02 RX ORDER — FOLIC ACID 1 MG/1
1000 TABLET ORAL DAILY
Qty: 90 TABLET | Refills: 0 | Status: SHIPPED | OUTPATIENT
Start: 2025-04-02

## 2025-04-04 ENCOUNTER — APPOINTMENT (EMERGENCY)
Dept: CT IMAGING | Facility: HOSPITAL | Age: 60
End: 2025-04-04
Payer: COMMERCIAL

## 2025-04-04 ENCOUNTER — HOSPITAL ENCOUNTER (INPATIENT)
Facility: HOSPITAL | Age: 60
LOS: 4 days | Discharge: NURSING, REHAB OR LONG-TERM CARE | End: 2025-04-08
Attending: EMERGENCY MEDICINE | Admitting: INTERNAL MEDICINE
Payer: COMMERCIAL

## 2025-04-04 ENCOUNTER — APPOINTMENT (EMERGENCY)
Dept: RADIOLOGY | Facility: HOSPITAL | Age: 60
End: 2025-04-04
Payer: COMMERCIAL

## 2025-04-04 DIAGNOSIS — T14.8XXA MUSCLE STRAIN: ICD-10-CM

## 2025-04-04 DIAGNOSIS — Z72.0 TOBACCO ABUSE: ICD-10-CM

## 2025-04-04 DIAGNOSIS — M25.551 RIGHT HIP PAIN: ICD-10-CM

## 2025-04-04 DIAGNOSIS — M76.31 IT BAND SYNDROME, RIGHT: ICD-10-CM

## 2025-04-04 DIAGNOSIS — E83.42 HYPOMAGNESEMIA: ICD-10-CM

## 2025-04-04 DIAGNOSIS — S72.114A CLOSED NONDISPLACED FRACTURE OF GREATER TROCHANTER OF RIGHT FEMUR, INITIAL ENCOUNTER (HCC): Primary | ICD-10-CM

## 2025-04-04 DIAGNOSIS — E87.1 HYPONATREMIA: ICD-10-CM

## 2025-04-04 LAB
ALBUMIN SERPL BCG-MCNC: 4.3 G/DL (ref 3.5–5)
ALP SERPL-CCNC: 105 U/L (ref 34–104)
ALT SERPL W P-5'-P-CCNC: 22 U/L (ref 7–52)
ANION GAP SERPL CALCULATED.3IONS-SCNC: 10 MMOL/L (ref 4–13)
AST SERPL W P-5'-P-CCNC: 48 U/L (ref 13–39)
BASOPHILS # BLD AUTO: 0.03 THOUSANDS/ÂΜL (ref 0–0.1)
BASOPHILS NFR BLD AUTO: 1 % (ref 0–1)
BILIRUB SERPL-MCNC: 1.1 MG/DL (ref 0.2–1)
BUN SERPL-MCNC: 5 MG/DL (ref 5–25)
CALCIUM SERPL-MCNC: 9.5 MG/DL (ref 8.4–10.2)
CHLORIDE SERPL-SCNC: 90 MMOL/L (ref 96–108)
CO2 SERPL-SCNC: 29 MMOL/L (ref 21–32)
CREAT SERPL-MCNC: 0.59 MG/DL (ref 0.6–1.3)
EOSINOPHIL # BLD AUTO: 0.04 THOUSAND/ÂΜL (ref 0–0.61)
EOSINOPHIL NFR BLD AUTO: 1 % (ref 0–6)
ERYTHROCYTE [DISTWIDTH] IN BLOOD BY AUTOMATED COUNT: 14.6 % (ref 11.6–15.1)
GFR SERPL CREATININE-BSD FRML MDRD: 111 ML/MIN/1.73SQ M
GLUCOSE SERPL-MCNC: 94 MG/DL (ref 65–140)
HCT VFR BLD AUTO: 39.2 % (ref 36.5–49.3)
HGB BLD-MCNC: 13.5 G/DL (ref 12–17)
IMM GRANULOCYTES # BLD AUTO: 0.01 THOUSAND/UL (ref 0–0.2)
IMM GRANULOCYTES NFR BLD AUTO: 0 % (ref 0–2)
LYMPHOCYTES # BLD AUTO: 0.84 THOUSANDS/ÂΜL (ref 0.6–4.47)
LYMPHOCYTES NFR BLD AUTO: 14 % (ref 14–44)
MAGNESIUM SERPL-MCNC: 1.2 MG/DL (ref 1.9–2.7)
MCH RBC QN AUTO: 31.7 PG (ref 26.8–34.3)
MCHC RBC AUTO-ENTMCNC: 34.4 G/DL (ref 31.4–37.4)
MCV RBC AUTO: 92 FL (ref 82–98)
MONOCYTES # BLD AUTO: 0.63 THOUSAND/ÂΜL (ref 0.17–1.22)
MONOCYTES NFR BLD AUTO: 11 % (ref 4–12)
NEUTROPHILS # BLD AUTO: 4.29 THOUSANDS/ÂΜL (ref 1.85–7.62)
NEUTS SEG NFR BLD AUTO: 73 % (ref 43–75)
NRBC BLD AUTO-RTO: 0 /100 WBCS
PHOSPHATE SERPL-MCNC: 3.2 MG/DL (ref 2.7–4.5)
PLATELET # BLD AUTO: 136 THOUSANDS/UL (ref 149–390)
PMV BLD AUTO: 9.8 FL (ref 8.9–12.7)
POTASSIUM SERPL-SCNC: 3.7 MMOL/L (ref 3.5–5.3)
PROT SERPL-MCNC: 7.9 G/DL (ref 6.4–8.4)
RBC # BLD AUTO: 4.26 MILLION/UL (ref 3.88–5.62)
SODIUM SERPL-SCNC: 129 MMOL/L (ref 135–147)
WBC # BLD AUTO: 5.84 THOUSAND/UL (ref 4.31–10.16)

## 2025-04-04 PROCEDURE — 72192 CT PELVIS W/O DYE: CPT

## 2025-04-04 PROCEDURE — 94760 N-INVAS EAR/PLS OXIMETRY 1: CPT

## 2025-04-04 PROCEDURE — 73564 X-RAY EXAM KNEE 4 OR MORE: CPT

## 2025-04-04 PROCEDURE — 99222 1ST HOSP IP/OBS MODERATE 55: CPT | Performed by: PHYSICIAN ASSISTANT

## 2025-04-04 PROCEDURE — 96372 THER/PROPH/DIAG INJ SC/IM: CPT

## 2025-04-04 PROCEDURE — 83735 ASSAY OF MAGNESIUM: CPT | Performed by: EMERGENCY MEDICINE

## 2025-04-04 PROCEDURE — 36415 COLL VENOUS BLD VENIPUNCTURE: CPT | Performed by: EMERGENCY MEDICINE

## 2025-04-04 PROCEDURE — 73502 X-RAY EXAM HIP UNI 2-3 VIEWS: CPT

## 2025-04-04 PROCEDURE — 85025 COMPLETE CBC W/AUTO DIFF WBC: CPT | Performed by: EMERGENCY MEDICINE

## 2025-04-04 PROCEDURE — 94668 MNPJ CHEST WALL SBSQ: CPT

## 2025-04-04 PROCEDURE — 99284 EMERGENCY DEPT VISIT MOD MDM: CPT

## 2025-04-04 PROCEDURE — 84100 ASSAY OF PHOSPHORUS: CPT | Performed by: EMERGENCY MEDICINE

## 2025-04-04 PROCEDURE — 80053 COMPREHEN METABOLIC PANEL: CPT | Performed by: EMERGENCY MEDICINE

## 2025-04-04 RX ORDER — MAGNESIUM SULFATE HEPTAHYDRATE 40 MG/ML
2 INJECTION, SOLUTION INTRAVENOUS ONCE
Status: COMPLETED | OUTPATIENT
Start: 2025-04-04 | End: 2025-04-04

## 2025-04-04 RX ORDER — PANTOPRAZOLE SODIUM 40 MG/1
40 TABLET, DELAYED RELEASE ORAL
Status: DISCONTINUED | OUTPATIENT
Start: 2025-04-05 | End: 2025-04-05

## 2025-04-04 RX ORDER — NICOTINE 21 MG/24HR
1 PATCH, TRANSDERMAL 24 HOURS TRANSDERMAL DAILY
Status: DISCONTINUED | OUTPATIENT
Start: 2025-04-05 | End: 2025-04-08 | Stop reason: HOSPADM

## 2025-04-04 RX ORDER — ALBUTEROL SULFATE 90 UG/1
2 INHALANT RESPIRATORY (INHALATION) EVERY 6 HOURS PRN
Status: DISCONTINUED | OUTPATIENT
Start: 2025-04-04 | End: 2025-04-08 | Stop reason: HOSPADM

## 2025-04-04 RX ORDER — HEPARIN SODIUM 5000 [USP'U]/ML
5000 INJECTION, SOLUTION INTRAVENOUS; SUBCUTANEOUS EVERY 8 HOURS SCHEDULED
Status: DISCONTINUED | OUTPATIENT
Start: 2025-04-04 | End: 2025-04-05

## 2025-04-04 RX ORDER — SODIUM CHLORIDE 1 G/1
3 TABLET ORAL 2 TIMES DAILY
Status: DISCONTINUED | OUTPATIENT
Start: 2025-04-04 | End: 2025-04-08 | Stop reason: HOSPADM

## 2025-04-04 RX ORDER — OXYCODONE HYDROCHLORIDE 10 MG/1
10 TABLET ORAL EVERY 6 HOURS PRN
Refills: 0 | Status: DISCONTINUED | OUTPATIENT
Start: 2025-04-04 | End: 2025-04-05

## 2025-04-04 RX ORDER — FOLIC ACID 1 MG/1
1000 TABLET ORAL DAILY
Status: DISCONTINUED | OUTPATIENT
Start: 2025-04-05 | End: 2025-04-08 | Stop reason: HOSPADM

## 2025-04-04 RX ORDER — ONDANSETRON 2 MG/ML
4 INJECTION INTRAMUSCULAR; INTRAVENOUS EVERY 6 HOURS PRN
Status: DISCONTINUED | OUTPATIENT
Start: 2025-04-04 | End: 2025-04-08 | Stop reason: HOSPADM

## 2025-04-04 RX ORDER — KETOROLAC TROMETHAMINE 30 MG/ML
30 INJECTION, SOLUTION INTRAMUSCULAR; INTRAVENOUS ONCE
Status: COMPLETED | OUTPATIENT
Start: 2025-04-04 | End: 2025-04-04

## 2025-04-04 RX ORDER — CALCIUM CARBONATE 500(1250)
500 TABLET ORAL DAILY
Status: DISCONTINUED | OUTPATIENT
Start: 2025-04-05 | End: 2025-04-08 | Stop reason: HOSPADM

## 2025-04-04 RX ORDER — MAGNESIUM SULFATE HEPTAHYDRATE 40 MG/ML
4 INJECTION, SOLUTION INTRAVENOUS ONCE
Status: DISCONTINUED | OUTPATIENT
Start: 2025-04-04 | End: 2025-04-04

## 2025-04-04 RX ORDER — DOXEPIN HYDROCHLORIDE 25 MG/1
50 CAPSULE ORAL
Status: DISCONTINUED | OUTPATIENT
Start: 2025-04-04 | End: 2025-04-08 | Stop reason: HOSPADM

## 2025-04-04 RX ORDER — HYDROMORPHONE HCL/PF 1 MG/ML
1 SYRINGE (ML) INJECTION ONCE
Refills: 0 | Status: COMPLETED | OUTPATIENT
Start: 2025-04-04 | End: 2025-04-04

## 2025-04-04 RX ORDER — OXYCODONE HYDROCHLORIDE 5 MG/1
5 TABLET ORAL EVERY 6 HOURS PRN
Refills: 0 | Status: DISCONTINUED | OUTPATIENT
Start: 2025-04-04 | End: 2025-04-05

## 2025-04-04 RX ORDER — HYDROMORPHONE HCL IN WATER/PF 6 MG/30 ML
0.2 PATIENT CONTROLLED ANALGESIA SYRINGE INTRAVENOUS EVERY 4 HOURS PRN
Status: DISCONTINUED | OUTPATIENT
Start: 2025-04-04 | End: 2025-04-06

## 2025-04-04 RX ORDER — POTASSIUM CHLORIDE 20MEQ/15ML
40 LIQUID (ML) ORAL ONCE
Status: COMPLETED | OUTPATIENT
Start: 2025-04-04 | End: 2025-04-04

## 2025-04-04 RX ORDER — PREGABALIN 50 MG/1
100 CAPSULE ORAL 3 TIMES DAILY
Status: DISCONTINUED | OUTPATIENT
Start: 2025-04-04 | End: 2025-04-08 | Stop reason: HOSPADM

## 2025-04-04 RX ORDER — LISINOPRIL 5 MG/1
2.5 TABLET ORAL DAILY
Status: DISCONTINUED | OUTPATIENT
Start: 2025-04-05 | End: 2025-04-08 | Stop reason: HOSPADM

## 2025-04-04 RX ORDER — LANOLIN ALCOHOL/MO/W.PET/CERES
100 CREAM (GRAM) TOPICAL DAILY
Status: DISCONTINUED | OUTPATIENT
Start: 2025-04-05 | End: 2025-04-08 | Stop reason: HOSPADM

## 2025-04-04 RX ADMIN — MAGNESIUM SULFATE HEPTAHYDRATE 2 G: 40 INJECTION, SOLUTION INTRAVENOUS at 21:45

## 2025-04-04 RX ADMIN — DOXEPIN HYDROCHLORIDE 50 MG: 25 CAPSULE ORAL at 23:55

## 2025-04-04 RX ADMIN — PREGABALIN 100 MG: 50 CAPSULE ORAL at 23:55

## 2025-04-04 RX ADMIN — HEPARIN SODIUM 5000 UNITS: 5000 INJECTION INTRAVENOUS; SUBCUTANEOUS at 23:23

## 2025-04-04 RX ADMIN — KETOROLAC TROMETHAMINE 30 MG: 30 INJECTION, SOLUTION INTRAMUSCULAR at 16:48

## 2025-04-04 RX ADMIN — LEVETIRACETAM 750 MG: 250 TABLET, FILM COATED ORAL at 23:55

## 2025-04-04 RX ADMIN — OXYCODONE HYDROCHLORIDE 10 MG: 10 TABLET ORAL at 23:03

## 2025-04-04 RX ADMIN — HYDROMORPHONE HYDROCHLORIDE 1 MG: 1 INJECTION, SOLUTION INTRAMUSCULAR; INTRAVENOUS; SUBCUTANEOUS at 20:33

## 2025-04-04 RX ADMIN — POTASSIUM CHLORIDE 40 MEQ: 20 SOLUTION ORAL at 21:43

## 2025-04-04 NOTE — Clinical Note
Cristofer Wiley was seen and treated in our emergency department on 4/4/2025.                Diagnosis:     Cristofer  may return to work on return date.    He may return on this date: 04/07/2025         If you have any questions or concerns, please don't hesitate to call.      Chauncey Cho, DO    ______________________________           _______________          _______________  Hospital Representative                              Date                                Time

## 2025-04-04 NOTE — ED PROVIDER NOTES
Time reflects when diagnosis was documented in both MDM as applicable and the Disposition within this note       Time User Action Codes Description Comment    4/4/2025  5:22 PM Chauncey Cho [M25.551] Right hip pain     4/4/2025  5:22 PM Chauncey Cho [M76.31] It band syndrome, right     4/4/2025  5:22 PM Chauncey Cho [T14.8XXA] Muscle strain     4/4/2025  8:04 PM Chauncey Cho [S72.114A] Closed nondisplaced fracture of greater trochanter of right femur, initial encounter ([facility])     4/4/2025  8:04 PM Chauncey Cho Modify [M25.551] Right hip pain     4/4/2025  8:04 PM Chauncey Cho Modify [S72.114A] Closed nondisplaced fracture of greater trochanter of right femur, initial encounter ([facility])     4/4/2025  8:45 PM Chauncey Cho [E87.1] Hyponatremia     4/4/2025  8:46 PM Chauncey Cho [E83.42] Hypomagnesemia           ED Disposition       ED Disposition   Admit    Condition   Stable    Date/Time   Fri Apr 4, 2025  8:16 PM    Comment   Case was discussed with MIA and the patient's admission status was agreed to be Admission Status: inpatient status to the service of Dr. Salinas .               Assessment & Plan       Medical Decision Making  59-year-old male presents for evaluation of injury to the right lower extremity.  Patient was playing with his dog when he is right foot planted causing him to rotate to the right.  Patient complaining of pain localized over the right hip extending down to the lateral aspect of the knee along the iliotibial band.  Patient also complaining of pain to the medial aspect of the knee.  Difficulty ambulating and weightbearing due to pain.  There is no deformity/rotation/shortening.  Neurovascular intact.  No other injuries.  No analgesia prior to arrival.  On exam, the patient is overall well-appearing and not in acute distress.    X-rays, symptom management, PT follow-up, orthopedic follow-up as needed.    Amount and/or Complexity of Data  Reviewed  Labs: ordered.  Radiology: ordered and independent interpretation performed. Decision-making details documented in ED Course.    Risk  Prescription drug management.  Decision regarding hospitalization.    2000 hrs.: Right greater trochanter fracture.  Nonoperative.  Nonweightbearing per Ortho.  Patient will need PT and possible rehab.  Admit.    Critical Care Time Statement: Upon my evaluation, this patient had a high probability of imminent or life-threatening deterioration due to Hypomagnesemia, Hyponatremia, which required my direct attention, intervention, and personal management.  I spent a total of 25 minutes directly providing critical care services, including interpretation of complex medical databases, evaluating for the presence of life-threatening injuries or illnesses, management of organ system failure(s) , complex medical decision making (to support/prevent further life-threatening deterioration)., interpretation of hemodynamic data, managing enteral or parenteral nutritional support, and electrolyte replacement . This time is exclusive of procedures, teaching, treating other patients, family meetings, and any prior time recorded by providers other than myself.            Medications   magnesium sulfate 4 g/100 mL IVPB (premix) 4 g (has no administration in time range)   potassium chloride oral solution 40 mEq (has no administration in time range)   ketorolac (TORADOL) injection 30 mg (30 mg Intramuscular Given 4/4/25 1648)   HYDROmorphone (DILAUDID) injection 1 mg (1 mg Intravenous Given 4/4/25 2033)       ED Risk Strat Scores                            SBIRT 22yo+      Flowsheet Row Most Recent Value   FRANKY: How many times in the past year have you...    Used an illegal drug or used a prescription medication for non-medical reasons? Never Filed at: 04/04/2025 1612                            History of Present Illness       Chief Complaint   Patient presents with    Hip Pain     Patient  reports playing with his dog when he fell. Complains of R hip pain down his leg.        Past Medical History:   Diagnosis Date    Alcohol abuse     Traore esophagus     Bowel obstruction (HCC)     Bowel perforation (HCC)     Cardiac disease     Continuous chronic alcoholism (HCC) 10/05/2017    Continuous chronic alcoholism (HCC) 10/05/2017    COPD (chronic obstructive pulmonary disease) (HCC)     History of shoulder surgery     Right shoulder    History of transfusion     Hx of cervical spine surgery     Hypertension     Incisional hernia 10/08/2017    MI, old     Mitral regurgitation     Psychiatric disorder     Seizures (HCC)       Past Surgical History:   Procedure Laterality Date    APPENDECTOMY      BACK SURGERY      CHOLECYSTECTOMY      ESOPHAGOGASTRODUODENOSCOPY N/A 11/28/2016    Procedure: ESOPHAGOGASTRODUODENOSCOPY (EGD);  Surgeon: Darryl Monroy MD;  Location: [facility];  Service:     GALLBLADDER SURGERY      LAPAROTOMY N/A 10/25/2016    Procedure: LAPAROTOMY EXPLORATORY;  Surgeon: Renzo Harper MD;  Location: [facility];  Service:     MOUTH SURGERY      NERVE BLOCK Bilateral 2/15/2024    Procedure: Bilateral L4-5 and L5-S1 medial branch block #1;  Surgeon: Jonathan Garcia MD;  Location: [facility];  Service: Pain Management     NERVE BLOCK Bilateral 3/7/2024    Procedure: BLOCK MEDIAL BRANCH B/L L4-5 and L5-S1 MBB#2;  Surgeon: Jonathan Garcia MD;  Location: [facility];  Service: Pain Management     PERCUTANEOUS PINNING FEMORAL NECK FRACTURE      RADIOFREQUENCY ABLATION Left 4/3/2024    Procedure: Left L4-L5 and L5-S1 radiofrequency ablation;  Surgeon: Jonathan Garcia MD;  Location: [facility];  Service: Pain Management     RADIOFREQUENCY ABLATION Right 4/26/2024    Procedure: Right L4-L5 and L5-S1 radiofrequency ablation;  Surgeon: Jonathan Garcia MD;  Location: [facility];  Service: Pain Management     SHOULDER SURGERY Right     SHOULDER SURGERY      SMALL INTESTINE SURGERY       STOMACH SURGERY      bal surgery      Family History   Problem Relation Age of Onset    Breast cancer Mother     Prostate cancer Father     Skin cancer Brother       Social History     Tobacco Use    Smoking status: Every Day     Current packs/day: 1.00     Average packs/day: 1 pack/day for 35.0 years (35.0 ttl pk-yrs)     Types: Cigarettes    Smokeless tobacco: Never   Vaping Use    Vaping status: Never Used   Substance Use Topics    Alcohol use: Not Currently     Alcohol/week: 42.0 standard drinks of alcohol     Types: 42 Cans of beer per week    Drug use: No      E-Cigarette/Vaping    E-Cigarette Use Never User       E-Cigarette/Vaping Substances    Nicotine No     THC No     CBD No     Flavoring No     Other No     Unknown No       I have reviewed and agree with the history as documented.     59-year-old male presents for evaluation of injury to the right lower extremity.  Patient was playing with his dog when he is right foot planted causing him to rotate to the right.  Patient complaining of pain localized over the right hip extending down to the lateral aspect of the knee along the iliotibial band.  Patient also complaining of pain to the medial aspect of the knee.  Difficulty ambulating and weightbearing due to pain.  There is no deformity/rotation/shortening.  Neurovascular intact.  No other injuries.  No analgesia prior to arrival.  On exam, the patient is overall well-appearing and not in acute distress.      Hip Pain  Associated symptoms: no abdominal pain, no chest pain, no cough, no diarrhea, no ear pain, no fatigue, no fever, no headaches, no myalgias, no nausea, no rash, no shortness of breath, no sore throat, no vomiting and no wheezing        Review of Systems   Constitutional:  Negative for activity change, appetite change, chills, diaphoresis, fatigue and fever.   HENT:  Negative for dental problem, ear pain, sore throat, trouble swallowing and voice change.    Eyes:  Negative for pain and  visual disturbance.   Respiratory:  Negative for cough, chest tightness, shortness of breath and wheezing.    Cardiovascular:  Negative for chest pain, palpitations and leg swelling.   Gastrointestinal:  Negative for abdominal pain, anal bleeding, blood in stool, diarrhea, nausea, rectal pain and vomiting.   Endocrine: Negative for polydipsia, polyphagia and polyuria.   Genitourinary:  Negative for difficulty urinating, dysuria, flank pain, frequency, hematuria and urgency.   Musculoskeletal:  Negative for back pain, joint swelling, myalgias, neck pain and neck stiffness.        Right hip pain   Skin:  Negative for pallor, rash and wound.   Neurological:  Negative for dizziness, facial asymmetry, speech difficulty, weakness, light-headedness, numbness and headaches.   Hematological:  Negative for adenopathy.   Psychiatric/Behavioral:  Negative for agitation. The patient is not nervous/anxious.    All other systems reviewed and are negative.          Objective       ED Triage Vitals [04/04/25 1613]   Temperature Pulse Blood Pressure Respirations SpO2 Patient Position - Orthostatic VS   99 °F (37.2 °C) 92 143/87 20 95 % Sitting      Temp Source Heart Rate Source BP Location FiO2 (%) Pain Score    Temporal Monitor Right arm -- 10 - Worst Possible Pain      Vitals      Date and Time Temp Pulse SpO2 Resp BP Pain Score FACES Pain Rating User   04/04/25 2033 -- -- -- -- -- 10 - Worst Possible Pain --    04/04/25 2015 -- 91 94 % 16 169/93 -- --    04/04/25 1800 -- 93 96 % 20 162/91 -- --    04/04/25 1648 -- -- -- -- -- 10 - Worst Possible Pain --    04/04/25 1613 99 °F (37.2 °C) 92 95 % 20 143/87 10 - Worst Possible Pain -- SK            Physical Exam  Vitals and nursing note reviewed.   Constitutional:       General: He is not in acute distress.     Appearance: He is well-developed. He is not ill-appearing, toxic-appearing or diaphoretic.   HENT:      Head: Normocephalic and atraumatic.      Right Ear: External ear  normal.      Left Ear: External ear normal.      Nose: Nose normal. No congestion or rhinorrhea.      Mouth/Throat:      Mouth: Mucous membranes are moist.   Eyes:      General: No visual field deficit or scleral icterus.        Right eye: No discharge.         Left eye: No discharge.      Extraocular Movements: Extraocular movements intact.      Conjunctiva/sclera: Conjunctivae normal.      Pupils: Pupils are equal, round, and reactive to light.   Neck:      Thyroid: No thyromegaly.      Vascular: No JVD.      Trachea: No tracheal deviation.   Cardiovascular:      Rate and Rhythm: Normal rate and regular rhythm.      Pulses: Normal pulses.      Heart sounds: Normal heart sounds, S1 normal and S2 normal. No murmur heard.     No friction rub. No gallop.   Pulmonary:      Effort: Pulmonary effort is normal. No accessory muscle usage, respiratory distress or retractions.      Breath sounds: Normal breath sounds and air entry. No stridor or decreased air movement. No decreased breath sounds, wheezing, rhonchi or rales.   Chest:      Chest wall: No tenderness.   Abdominal:      General: There is no distension.      Palpations: Abdomen is soft. There is no mass.      Tenderness: There is no abdominal tenderness. There is no guarding or rebound.      Hernia: No hernia is present.   Musculoskeletal:         General: No swelling, tenderness or deformity. Normal range of motion.      Cervical back: Normal range of motion and neck supple. No rigidity or tenderness.      Right lower leg: No edema.      Left lower leg: No edema.      Comments: Right lower extremity: Tenderness over the femoral head extending down to the lateral aspect of the knee along the IT band.  Tenderness to the lateral aspect of the knee.  No effusion, deformity or instability.  No deformity/rotation/shortening of the leg.  Limited range of motion at the hip and knee due to pain.  Full flexion extension of the foot.  Neurovascular intact.    Lymphadenopathy:      Cervical: No cervical adenopathy.   Skin:     General: Skin is warm and dry.      Capillary Refill: Capillary refill takes less than 2 seconds.      Coloration: Skin is not jaundiced or pale.      Findings: No bruising, erythema, lesion or rash.   Neurological:      General: No focal deficit present.      Mental Status: He is alert and oriented to person, place, and time.      GCS: GCS eye subscore is 4. GCS verbal subscore is 5. GCS motor subscore is 6.      Cranial Nerves: Cranial nerves 2-12 are intact. No cranial nerve deficit, dysarthria or facial asymmetry.      Sensory: Sensation is intact. No sensory deficit.      Motor: Motor function is intact. No weakness, tremor, atrophy, abnormal muscle tone or seizure activity.      Coordination: Coordination is intact. Coordination normal.      Gait: Gait is intact.      Deep Tendon Reflexes: Reflexes are normal and symmetric. Reflexes normal.   Psychiatric:         Behavior: Behavior normal.         Results Reviewed       Procedure Component Value Units Date/Time    Comprehensive metabolic panel [[id number]]  (Abnormal) Collected: 04/04/25 2014    Lab Status: Final result Specimen: Blood from Arm, Right Updated: 04/04/25 2043     Sodium 129 mmol/L      Potassium 3.7 mmol/L      Chloride 90 mmol/L      CO2 29 mmol/L      ANION GAP 10 mmol/L      BUN 5 mg/dL      Creatinine 0.59 mg/dL      Glucose 94 mg/dL      Calcium 9.5 mg/dL      AST 48 U/L      ALT 22 U/L      Alkaline Phosphatase 105 U/L      Total Protein 7.9 g/dL      Albumin 4.3 g/dL      Total Bilirubin 1.10 mg/dL      eGFR 111 ml/min/1.73sq m     Narrative:      [employer] Kid[employer] guidelines for Chronic Kidney Disease (CKD):     Stage 1 with normal or high GFR (GFR > 90 mL/min/1.73 square meters)    Stage 2 Mild CKD (GFR = 60-89 mL/min/1.73 square meters)    Stage 3A Moderate CKD (GFR = 45-59 mL/min/1.73 square meters)    Stage 3B Moderate CKD (GFR = 30-44 mL/min/1.73  square meters)    Stage 4 Severe CKD (GFR = 15-29 mL/min/1.73 square meters)    Stage 5 End Stage CKD (GFR <15 mL/min/1.73 square meters)  Note: GFR calculation is accurate only with a steady state creatinine    Magnesium [[id number]]  (Abnormal) Collected: 04/04/25 2014    Lab Status: Final result Specimen: Blood from Arm, Right Updated: 04/04/25 2043     Magnesium 1.2 mg/dL     Phosphorus [[id number]]  (Normal) Collected: 04/04/25 2014    Lab Status: Final result Specimen: Blood from Arm, Right Updated: 04/04/25 2043     Phosphorus 3.2 mg/dL     CBC and differential [[id number]]  (Abnormal) Collected: 04/04/25 2014    Lab Status: Final result Specimen: Blood from Arm, Right Updated: 04/04/25 2034     WBC 5.84 Thousand/uL      RBC 4.26 Million/uL      Hemoglobin 13.5 g/dL      Hematocrit 39.2 %      MCV 92 fL      MCH 31.7 pg      MCHC 34.4 g/dL      RDW 14.6 %      MPV 9.8 fL      Platelets 136 Thousands/uL      nRBC 0 /100 WBCs      Segmented % 73 %      Immature Grans % 0 %      Lymphocytes % 14 %      Monocytes % 11 %      Eosinophils Relative 1 %      Basophils Relative 1 %      Absolute Neutrophils 4.29 Thousands/µL      Absolute Immature Grans 0.01 Thousand/uL      Absolute Lymphocytes 0.84 Thousands/µL      Absolute Monocytes 0.63 Thousand/µL      Eosinophils Absolute 0.04 Thousand/µL      Basophils Absolute 0.03 Thousands/µL             CT pelvis wo contrast   ED Interpretation by Chauncey Cho DO (04/04 1903)   No obvious acute osseous abnormality.      Final Interpretation by Cristofer Padilla MD (04/04 1910)      Nondisplaced right greater trochanter fracture.      Workstation performed: [id number]         XR hip/pelv 2-3 vws right if performed   ED Interpretation by Chauncey Cho DO (04/04 1721)   No obvious acute osseous abnormality.        XR knee 4+ vw right injury   ED Interpretation by Chauncey Cho DO (04/04 1721)   No obvious acute osseous abnormality.           Procedures    ED Medication and Procedure Management   Prior to Admission Medications   Prescriptions Last Dose Informant Patient Reported? Taking?   Multiple Vitamin (Tab-A-Rosa) TABS   No No   Sig: Take 1 tablet by mouth daily   Vitamin D, Cholecalciferol, 25 MCG (1000 UT) TABS  Self No No   Sig: Take 1 tablet (1,000 Units total) by mouth daily   albuterol (PROVENTIL HFA,VENTOLIN HFA) 90 mcg/act inhaler  Self No No   Sig: Inhale 2 puffs every 6 (six) hours as needed for wheezing   calcium carbonate (OS-KORI) 600 MG tablet  Self No No   Sig: Take 1 tablet (600 mg total) by mouth daily   carbamide peroxide (DEBROX) 6.5 % otic solution  Self No No   Sig: Administer 5 drops to the right ear 2 (two) times a day   cyanocobalamin (VITAMIN B-12) 100 mcg tablet  Self No No   Sig: Take 1 tablet (100 mcg total) by mouth daily   doxepin (SINEquan) 50 mg capsule   No No   Sig: Take 1 capsule by mouth once daily at bedtime   folic acid (FOLVITE) 1 mg tablet   No No   Sig: Take 1 tablet by mouth once daily   levETIRAcetam (KEPPRA) 750 mg tablet  Self No No   Sig: Take 1 tablet (750 mg total) by mouth every 12 (twelve) hours   lisinopril (ZESTRIL) 2.5 mg tablet   No No   Sig: Take 1 tablet (2.5 mg total) by mouth daily   omeprazole (PriLOSEC) 40 MG capsule   No No   Sig: Take 1 capsule by mouth twice daily   pregabalin (LYRICA) 100 mg capsule   No No   Sig: Take 1 capsule (100 mg total) by mouth 3 (three) times a day   sodium chloride 1 g tablet  Self No No   Sig: TAKE 3 TABLETS BY MOUTH TWICE DAILY   tamsulosin (FLOMAX) 0.4 mg   No No   Sig: Take 1 capsule (0.4 mg total) by mouth daily with dinner   thiamine (VITAMIN B1) 100 mg tablet  Self No No   Sig: Take 1 tablet (100 mg total) by mouth daily   umeclidinium-vilanterol (Anoro Ellipta) 62.5-25 mcg/actuation inhaler  Self No No   Sig: Inhale 1 puff daily      Facility-Administered Medications: None     Patient's Medications   Discharge Prescriptions    No medications on  file     No discharge procedures on file.  ED SEPSIS DOCUMENTATION   Time reflects when diagnosis was documented in both MDM as applicable and the Disposition within this note       Time User Action Codes Description Comment    4/4/2025  5:22 PM Chauncey Cho [M25.551] Right hip pain     4/4/2025  5:22 PM Chauncey Cho [M76.31] It band syndrome, right     4/4/2025  5:22 PM Chauncey Cho [T14.8XXA] Muscle strain     4/4/2025  8:04 PM Chauncey Cho [S72.114A] Closed nondisplaced fracture of greater trochanter of right femur, initial encounter ([facility])     4/4/2025  8:04 PM Chauncey Cho Modify [M25.551] Right hip pain     4/4/2025  8:04 PM Chauncey Cho [S72.114A] Closed nondisplaced fracture of greater trochanter of right femur, initial encounter ([facility])     4/4/2025  8:45 PM Chauncey Cho [E87.1] Hyponatremia     4/4/2025  8:46 PM Chauncey Cho [E83.42] Hypomagnesemia                  Chauncey Cho, DO  04/04/25 2046       Chauncey Cho, DO  04/04/25 2106

## 2025-04-04 NOTE — Clinical Note
Case was discussed with MIA and the patient's admission status was agreed to be Admission Status: inpatient status to the service of Dr. Salinas .

## 2025-04-05 PROBLEM — K21.9 GERD (GASTROESOPHAGEAL REFLUX DISEASE): Status: ACTIVE | Noted: 2025-04-05

## 2025-04-05 PROBLEM — K21.00 GASTROESOPHAGEAL REFLUX DISEASE WITH ESOPHAGITIS WITHOUT HEMORRHAGE: Status: RESOLVED | Noted: 2023-07-20 | Resolved: 2025-04-05

## 2025-04-05 LAB
ALBUMIN SERPL BCG-MCNC: 3.8 G/DL (ref 3.5–5)
ALP SERPL-CCNC: 95 U/L (ref 34–104)
ALT SERPL W P-5'-P-CCNC: 18 U/L (ref 7–52)
ANION GAP SERPL CALCULATED.3IONS-SCNC: 9 MMOL/L (ref 4–13)
AST SERPL W P-5'-P-CCNC: 36 U/L (ref 13–39)
BASOPHILS # BLD AUTO: 0.02 THOUSANDS/ÂΜL (ref 0–0.1)
BASOPHILS NFR BLD AUTO: 0 % (ref 0–1)
BILIRUB SERPL-MCNC: 1.02 MG/DL (ref 0.2–1)
BUN SERPL-MCNC: 7 MG/DL (ref 5–25)
CALCIUM SERPL-MCNC: 8.9 MG/DL (ref 8.4–10.2)
CHLORIDE SERPL-SCNC: 91 MMOL/L (ref 96–108)
CO2 SERPL-SCNC: 27 MMOL/L (ref 21–32)
CREAT SERPL-MCNC: 0.63 MG/DL (ref 0.6–1.3)
EOSINOPHIL # BLD AUTO: 0.11 THOUSAND/ÂΜL (ref 0–0.61)
EOSINOPHIL NFR BLD AUTO: 2 % (ref 0–6)
ERYTHROCYTE [DISTWIDTH] IN BLOOD BY AUTOMATED COUNT: 14.7 % (ref 11.6–15.1)
GFR SERPL CREATININE-BSD FRML MDRD: 108 ML/MIN/1.73SQ M
GLUCOSE SERPL-MCNC: 91 MG/DL (ref 65–140)
HCT VFR BLD AUTO: 38.7 % (ref 36.5–49.3)
HGB BLD-MCNC: 12.9 G/DL (ref 12–17)
IMM GRANULOCYTES # BLD AUTO: 0.01 THOUSAND/UL (ref 0–0.2)
IMM GRANULOCYTES NFR BLD AUTO: 0 % (ref 0–2)
LEVETIRACETAM SERPL-MCNC: <2 UG/ML (ref 12–46)
LYMPHOCYTES # BLD AUTO: 0.81 THOUSANDS/ÂΜL (ref 0.6–4.47)
LYMPHOCYTES NFR BLD AUTO: 17 % (ref 14–44)
MAGNESIUM SERPL-MCNC: 2.1 MG/DL (ref 1.9–2.7)
MCH RBC QN AUTO: 30.9 PG (ref 26.8–34.3)
MCHC RBC AUTO-ENTMCNC: 33.3 G/DL (ref 31.4–37.4)
MCV RBC AUTO: 93 FL (ref 82–98)
MONOCYTES # BLD AUTO: 0.58 THOUSAND/ÂΜL (ref 0.17–1.22)
MONOCYTES NFR BLD AUTO: 12 % (ref 4–12)
NEUTROPHILS # BLD AUTO: 3.2 THOUSANDS/ÂΜL (ref 1.85–7.62)
NEUTS SEG NFR BLD AUTO: 69 % (ref 43–75)
NRBC BLD AUTO-RTO: 0 /100 WBCS
OSMOLALITY UR: 386 MMOL/KG (ref 250–900)
PHOSPHATE SERPL-MCNC: 3 MG/DL (ref 2.7–4.5)
PLATELET # BLD AUTO: 119 THOUSANDS/UL (ref 149–390)
PMV BLD AUTO: 10.3 FL (ref 8.9–12.7)
POTASSIUM SERPL-SCNC: 3.9 MMOL/L (ref 3.5–5.3)
PROT SERPL-MCNC: 7.1 G/DL (ref 6.4–8.4)
RBC # BLD AUTO: 4.18 MILLION/UL (ref 3.88–5.62)
SODIUM SERPL-SCNC: 127 MMOL/L (ref 135–147)
SODIUM UR-SCNC: 111 MMOL/L
WBC # BLD AUTO: 4.73 THOUSAND/UL (ref 4.31–10.16)

## 2025-04-05 PROCEDURE — 83935 ASSAY OF URINE OSMOLALITY: CPT | Performed by: PHYSICIAN ASSISTANT

## 2025-04-05 PROCEDURE — 80053 COMPREHEN METABOLIC PANEL: CPT | Performed by: PHYSICIAN ASSISTANT

## 2025-04-05 PROCEDURE — 84300 ASSAY OF URINE SODIUM: CPT | Performed by: PHYSICIAN ASSISTANT

## 2025-04-05 PROCEDURE — 83735 ASSAY OF MAGNESIUM: CPT | Performed by: PHYSICIAN ASSISTANT

## 2025-04-05 PROCEDURE — 99232 SBSQ HOSP IP/OBS MODERATE 35: CPT | Performed by: INTERNAL MEDICINE

## 2025-04-05 PROCEDURE — 85025 COMPLETE CBC W/AUTO DIFF WBC: CPT | Performed by: PHYSICIAN ASSISTANT

## 2025-04-05 PROCEDURE — 97166 OT EVAL MOD COMPLEX 45 MIN: CPT

## 2025-04-05 PROCEDURE — 94760 N-INVAS EAR/PLS OXIMETRY 1: CPT

## 2025-04-05 PROCEDURE — 99222 1ST HOSP IP/OBS MODERATE 55: CPT

## 2025-04-05 PROCEDURE — 84100 ASSAY OF PHOSPHORUS: CPT | Performed by: PHYSICIAN ASSISTANT

## 2025-04-05 PROCEDURE — 83520 IMMUNOASSAY QUANT NOS NONAB: CPT | Performed by: PHYSICIAN ASSISTANT

## 2025-04-05 PROCEDURE — 94668 MNPJ CHEST WALL SBSQ: CPT

## 2025-04-05 RX ORDER — OMEPRAZOLE 20 MG/1
40 CAPSULE, DELAYED RELEASE ORAL
Status: DISCONTINUED | OUTPATIENT
Start: 2025-04-05 | End: 2025-04-08 | Stop reason: HOSPADM

## 2025-04-05 RX ORDER — SODIUM CHLORIDE 1 G/1
TABLET ORAL
Status: DISPENSED
Start: 2025-04-05 | End: 2025-04-05

## 2025-04-05 RX ORDER — OXYCODONE HYDROCHLORIDE 5 MG/1
5 TABLET ORAL EVERY 4 HOURS PRN
Refills: 0 | Status: DISCONTINUED | OUTPATIENT
Start: 2025-04-05 | End: 2025-04-08 | Stop reason: HOSPADM

## 2025-04-05 RX ORDER — OXYCODONE HYDROCHLORIDE 10 MG/1
10 TABLET ORAL EVERY 4 HOURS PRN
Refills: 0 | Status: DISCONTINUED | OUTPATIENT
Start: 2025-04-05 | End: 2025-04-08 | Stop reason: HOSPADM

## 2025-04-05 RX ORDER — ENOXAPARIN SODIUM 100 MG/ML
40 INJECTION SUBCUTANEOUS EVERY 24 HOURS
Status: DISCONTINUED | OUTPATIENT
Start: 2025-04-05 | End: 2025-04-08 | Stop reason: HOSPADM

## 2025-04-05 RX ADMIN — SODIUM CHLORIDE 3 G: 1 TABLET ORAL at 08:14

## 2025-04-05 RX ADMIN — LEVETIRACETAM 750 MG: 250 TABLET, FILM COATED ORAL at 22:38

## 2025-04-05 RX ADMIN — VITAM B12 100 MCG: 100 TAB at 08:13

## 2025-04-05 RX ADMIN — OMEPRAZOLE 40 MG: 20 CAPSULE, DELAYED RELEASE ORAL at 16:32

## 2025-04-05 RX ADMIN — OXYCODONE HYDROCHLORIDE 10 MG: 10 TABLET ORAL at 13:33

## 2025-04-05 RX ADMIN — SODIUM CHLORIDE 3 G: 1 TABLET ORAL at 20:35

## 2025-04-05 RX ADMIN — PREGABALIN 100 MG: 50 CAPSULE ORAL at 08:14

## 2025-04-05 RX ADMIN — LISINOPRIL 2.5 MG: 5 TABLET ORAL at 08:13

## 2025-04-05 RX ADMIN — CALCIUM 500 MG: 500 TABLET ORAL at 08:14

## 2025-04-05 RX ADMIN — UMECLIDINIUM BROMIDE AND VILANTEROL TRIFENATATE 1 PUFF: 62.5; 25 POWDER RESPIRATORY (INHALATION) at 08:13

## 2025-04-05 RX ADMIN — HYDROMORPHONE HYDROCHLORIDE 0.2 MG: 0.2 INJECTION, SOLUTION INTRAMUSCULAR; INTRAVENOUS; SUBCUTANEOUS at 19:30

## 2025-04-05 RX ADMIN — HEPARIN SODIUM 5000 UNITS: 5000 INJECTION INTRAVENOUS; SUBCUTANEOUS at 06:10

## 2025-04-05 RX ADMIN — THIAMINE HCL TAB 100 MG 100 MG: 100 TAB at 08:14

## 2025-04-05 RX ADMIN — OXYCODONE HYDROCHLORIDE 10 MG: 10 TABLET ORAL at 16:36

## 2025-04-05 RX ADMIN — LEVETIRACETAM 750 MG: 250 TABLET, FILM COATED ORAL at 11:36

## 2025-04-05 RX ADMIN — SODIUM CHLORIDE 3 G: 1 TABLET ORAL at 00:14

## 2025-04-05 RX ADMIN — ENOXAPARIN SODIUM 40 MG: 40 INJECTION SUBCUTANEOUS at 20:34

## 2025-04-05 RX ADMIN — FOLIC ACID 1000 MCG: 1 TABLET ORAL at 08:13

## 2025-04-05 RX ADMIN — Medication 1000 UNITS: at 08:13

## 2025-04-05 RX ADMIN — HYDROMORPHONE HYDROCHLORIDE 0.2 MG: 0.2 INJECTION, SOLUTION INTRAMUSCULAR; INTRAVENOUS; SUBCUTANEOUS at 00:15

## 2025-04-05 RX ADMIN — HEPARIN SODIUM 5000 UNITS: 5000 INJECTION INTRAVENOUS; SUBCUTANEOUS at 13:33

## 2025-04-05 RX ADMIN — DOXEPIN HYDROCHLORIDE 50 MG: 25 CAPSULE ORAL at 21:24

## 2025-04-05 RX ADMIN — PREGABALIN 100 MG: 50 CAPSULE ORAL at 16:32

## 2025-04-05 RX ADMIN — PREGABALIN 100 MG: 50 CAPSULE ORAL at 20:34

## 2025-04-05 NOTE — OCCUPATIONAL THERAPY NOTE
Occupational Therapy Evaluation     Patient Name: Cristofer Wiley  Today's Date: 4/5/2025  Problem List  Principal Problem:    Closed nondisplaced fracture of greater trochanter of right femur (HCC)  Active Problems:    Hypomagnesemia    Hyponatremia    Thrombocytopenia (HCC)    COPD (chronic obstructive pulmonary disease) (HCC)    Seizure disorder (HCC)    Past Medical History  Past Medical History:   Diagnosis Date    Alcohol abuse     Traore esophagus     Bowel obstruction (HCC)     Bowel perforation (HCC)     Cardiac disease     Continuous chronic alcoholism (HCC) 10/05/2017    Continuous chronic alcoholism (HCC) 10/05/2017    COPD (chronic obstructive pulmonary disease) (HCC)     History of shoulder surgery     Right shoulder    History of transfusion     Hx of cervical spine surgery     Hypertension     Incisional hernia 10/08/2017    [place], old     Mitral regurgitation     Psychiatric disorder     Seizures (HCC)      Past Surgical History  Past Surgical History:   Procedure Laterality Date    APPENDECTOMY      BACK SURGERY      CHOLECYSTECTOMY      ESOPHAGOGASTRODUODENOSCOPY N/A 11/28/2016    Procedure: ESOPHAGOGASTRODUODENOSCOPY (EGD);  Surgeon: Darryl Monroy MD;  Location:  GI LAB;  Service:     GALLBLADDER SURGERY      LAPAROTOMY N/A 10/25/2016    Procedure: LAPAROTOMY EXPLORATORY;  Surgeon: Renzo Harper MD;  [facility]: [facility];  Service:     MOUTH SURGERY      NERVE BLOCK Bilateral 02/15/2024    Procedure: Bilateral L4-5 and L5-S1 medial branch block #1;  Surgeon: Jonathan Garcia MD;  Location: [facility];  Service: Pain Management     NERVE BLOCK Bilateral 03/07/2024    Procedure: BLOCK MEDIAL BRANCH B/L L4-5 and L5-S1 MBB#2;  Surgeon: Jonathan Garcia MD;  Location: [facility];  Service: Pain Management     PERCUTANEOUS PINNING FEMORAL NECK FRACTURE      RADIOFREQUENCY ABLATION Left 04/03/2024    Procedure: Left L4-L5 and L5-S1 radiofrequency ablation;  Surgeon: Jonathan Garcia  "MD;  Location: [facility];  Service: Pain Management     RADIOFREQUENCY ABLATION Right 04/26/2024    Procedure: Right L4-L5 and L5-S1 radiofrequency ablation;  Surgeon: Jonathan Garcia MD;  Location: [facility];  Service: Pain Management     SHOULDER SURGERY Right     SHOULDER SURGERY      SMALL INTESTINE SURGERY      STOMACH SURGERY      bal surgery           04/05/25 8045   OT Last Visit   OT Visit Date 04/05/25   Note Type   Note type Evaluation   Pain Assessment   Pain Assessment Tool 0-10   Pain Score 9   Pain Location/Orientation Orientation: Right;Location: Hip;Location: Leg   Restrictions/Precautions   Weight Bearing Precautions Per Order Yes   RLE Weight Bearing Per Order WBAT  (per quick note from DO; nonoperative management at this time via ortho consult)   Other Precautions Fall Risk;Pain;Bed Alarm;WBS  (hip frac to R LE)   Home Living   Type of Home House   Home Layout Multi-level;Bed/bath upstairs;Stairs to enter with rails  (6 TIFF c HR; 14 steps to 2nd with HR)   Bathroom Shower/Tub   (clawfoot tub; HHS attachment)   Bathroom Toilet Standard   Bathroom Equipment Hand-held shower;Commode  (reports brother does not allow BSC in living room)   Bathroom Accessibility Accessible   Home Equipment Walker;Cane;Wheelchair-manual;Quad cane   Additional Comments pt reports prior to injury, no device utilize during mobility   Prior Function   Level of [place] Independent with ADLs;Independent with functional mobility;Independent with IADLS   Lives With Family  (brother)   IADLs Independent with driving   Falls in the last 6 months 1 to 4   Vocational On disability   Comments pt reports brother is very supportive   Subjective   Subjective \"I was playing with the dog\"   ADL   Where Assessed Edge of bed   Grooming Assistance 5  Supervision/Setup   UB Bathing Assistance 4  Minimal Assistance   LB Bathing Assistance 3  Moderate Assistance   UB Dressing Assistance 5  Supervision/Setup   LB Dressing Assistance " 3  Moderate Assistance   Toileting Assistance  4  Minimal Assistance   Additional Comments pt with significant difficulties bending forward to complete LB ADLs at this time due to R hip pain; Given fxl performance skills + medical complexity, therapist suspecting via clinical judgement + skilled analysis; pt currently requires stated assist above to perform each area of ADL d/t limitations including: generalized muscle weakness, sitting/standing balance deficits, fxl activity tolerance limitations, difficulty performing bend/reach-anterior trunk flexion, requires external assist to complete transitional movements, decreased core strength, decreased postural control, instability in stance, cognitive deficits, safety awareness concerns, and decreased problem solving abilities.   Bed Mobility   Supine to Sit 4  Minimal assistance   Additional items Assist x 1;Bedrails;Increased time required;LE management   Sit to Supine 4  Minimal assistance   Additional items Assist x 1;Increased time required;Verbal cues;LE management   Additional Comments pt on RA during session; SpO2 WFL with no complaints of SOB; pt is very effective at utilizing pelvis to bridge to EOB during bed mobility tasks; requires min (A) for R LE in and out of bed   Transfers   Sit to Stand 4  Minimal assistance   Additional items Assist x 1;Bedrails;Impulsive;Verbal cues  (RW)   Stand to Sit 4  Minimal assistance   Additional items Assist x 1;Verbal cues;Impulsive  (RW)   Additional Comments pt performs x1 impulsive functional transfer with no device and further transfers with min (A) x1 and RW; education provided on WBAT to R LE, however prefers NWB to toe-touch WBS with increased support through B UE and RW   Functional Mobility   Functional Mobility 4  Minimal assistance   Additional Comments x1; performs ~3ft forward with significant pain and 3ft backwards towards bed; mildly impulsive with cues for safety and stability   Additional items Rolling  walker   Balance   Static Sitting Fair +   Dynamic Sitting Fair +   Static Standing Fair   Dynamic Standing Fair -   Ambulatory Poor +   Activity Tolerance   Activity Tolerance Patient limited by fatigue;Patient limited by pain   RUE Assessment   RUE Assessment WFL   LUE Assessment   LUE Assessment WFL   Hand Function   Gross Motor Coordination Functional   Fine Motor Coordination Functional   Sensation   Light Touch No apparent deficits   Sharp/Dull No apparent deficits   Psychosocial   Psychosocial (WDL) WDL   Cognition   Overall Cognitive Status WFL   Arousal/Participation Alert   Attention Within functional limits   Orientation Level Oriented X4   Memory Within functional limits   Following Commands Follows all commands and directions without difficulty   Comments pt is very motivated to participate in therapy   Assessment   Limitation Decreased ADL status;Decreased UE strength;Decreased Safe judgement during ADL;Decreased endurance;Decreased self-care trans;Decreased high-level ADLs   Assessment Pt is a 59 y.o. male seen for OT evaluation s/p admit to [facility] 4/4/2025 w/ Closed nondisplaced fracture of greater trochanter of right femur (HCC). Pt with PMHx impacting their performance during ADL tasks, including: COPD, HTN, hypomagnesemia, generalized weakness, L drop foot, neuropathy . Prior to admission to the hospital Pt was performing ADLs without physical assistance. IADLs without physical assistance. Functional transfers/ambulation without physical assistance. Cognitive status PTA was A&Ox4. OT order placed to assess Pt's ADLs, cognitive status, and performance during functional tasks in order to maximize safety and independence while making most appropriate d/c recommendations. Pt's clinical presentation is currently evolving given new onset deficits that affect Pt's occupational performance and ability to safely return to PLOF including decrease activity tolerance, decrease standing balance, decrease  performance during ADL tasks, decrease safety awareness , increase impulsiveness, increased pain, decrease generalized strength, decrease activity engagement, decrease performance during functional transfers, steps to enter home, limited family support, and high fall risk combined with medical complications of hypertension , pain impacting overall mobility status, abnormal renal lab values, abnormal CBC, abnormal sodium values, impulsivity during admission, and need for input for mobility technique/safety. 0O2 needs. Personal factors affecting Pt at time of initial evaluation include: hx of non-compliance, inaccessible home environment, step(s) to enter environment, multi-level environment, behavioral pattern, inability to perform IADLs, inability to perform ADLs, new need for AD, inability to ambulate household distances, inability to navigate community distances, and decreased initiation and engagement. The patient's raw score on the -PAC Daily Activity Inpatient Short Form is 19. A raw score of greater than or equal to 19 suggests the patient may benefit from discharge to home. Please refer to the recommendation of the Occupational Therapist for safe discharge planning. Pt will benefit from continued skilled OT services to address deficits as defined above and to maximize independence/participation during ADLs and functional tasks to facilitate return toward PLOF and improved quality of life. From an occupational therapy standpoint, Level I (Maximum Resource Intensity) is recommended upon d/c.   Goals   Patient Goals to get moving   Short Term Goal  pt will perform UE strengthening exercises   Long Term Goal #1 pt will demonstrate toilet transfers and hygiene at (S) level   Long Term Goal #2 pt will demonstrate functional mobility with RW at (S) level   Long Term Goal pt will demosntrate UB/LB bathing and grooming tasks with or without LH AE PRN at (S) level   Plan   Treatment Interventions ADL  retraining;Functional transfer training;UE strengthening/ROM;Endurance training;Patient/family training;Equipment evaluation/education;Compensatory technique education;Activityengagement   Goal Expiration Date 04/19/25   OT Frequency 3-5x/wk   Discharge Recommendation   Rehab Resource Intensity Level, OT I (Maximum Resource Intensity)   Additional Comments  (S)  pt is acute rehab appropriate   AM-PAC Daily Activity Inpatient   Lower Body Dressing 2   Bathing 2   Toileting 3   Upper Body Dressing 4   Grooming 4   Eating 4   Daily Activity Raw Score 19   Daily Activity Standardized Score (Calc for Raw Score >=11) 40.22   AM-PAC Applied Cognition Inpatient   Following a Speech/Presentation 4   Understanding Ordinary Conversation 4   Taking Medications 4   Remembering Where Things Are Placed or Put Away 4   Remembering List of 4-5 Errands 4   Taking Care of Complicated Tasks 4   Applied Cognition Raw Score 24   Applied Cognition Standardized Score 62.21

## 2025-04-05 NOTE — PLAN OF CARE
Problem: PAIN - ADULT  Goal: Verbalizes/displays adequate comfort level or baseline comfort level  Description: Interventions:- Encourage patient to monitor pain and request assistance- Assess pain using appropriate pain scale- Administer analgesics based on type and severity of pain and evaluate response- Implement non-pharmacological measures as appropriate and evaluate response- Consider cultural and social influences on pain and pain management- Notify physician/advanced practitioner if interventions unsuccessful or patient reports new pain  Outcome: Progressing     Problem: INFECTION - ADULT  Goal: Absence or prevention of progression during hospitalization  Description: INTERVENTIONS:- Assess and monitor for signs and symptoms of infection- Monitor lab/diagnostic results- Monitor all insertion sites, i.e. indwelling lines, tubes, and drains- Monitor endotracheal if appropriate and nasal secretions for changes in amount and color- [place] appropriate cooling/warming therapies per order- Administer medications as ordered- Instruct and encourage patient and family to use good hand hygiene technique- Identify and instruct in appropriate isolation precautions for identified infection/condition  Outcome: Progressing  Goal: Absence of fever/infection during neutropenic period  Description: INTERVENTIONS:- Monitor WBC  Outcome: Progressing     Problem: SAFETY ADULT  Goal: Patient will remain free of falls  Description: INTERVENTIONS:- Educate patient/family on patient safety including physical limitations- Instruct patient to call for assistance with activity - Consult OT/PT to assist with strengthening/mobility - Keep Call bell within reach- Keep bed low and locked with side rails adjusted as appropriate- Keep care items and personal belongings within reach- Initiate and maintain comfort rounds- Make Fall Risk Sign visible to staff- Offer Toileting every 3 Hours, in advance of need- Initiate/Maintain bed alarm-  Obtain necessary fall risk management equipment: - Apply yellow socks and bracelet for high fall risk patients- Consider moving patient to room near nurses station  Outcome: Progressing  Goal: Maintain or return to baseline ADL function  Description: INTERVENTIONS:-  Assess patient's ability to carry out ADLs; assess patient's baseline for ADL function and identify physical deficits which impact ability to perform ADLs (bathing, care of mouth/teeth, toileting, grooming, dressing, etc.)- Assess/evaluate cause of self-care deficits - Assess range of motion- Assess patient's mobility; develop plan if impaired- Assess patient's need for assistive devices and provide as appropriate- Encourage maximum independence but intervene and supervise when necessary- Involve family in performance of ADLs- Assess for home care needs following discharge - Consider OT consult to assist with ADL evaluation and planning for discharge- Provide patient education as appropriate  Outcome: Progressing  Goal: Maintains/Returns to pre admission functional level  Description: INTERVENTIONS:- Perform AM-PAC 6 Click Basic Mobility/ Daily Activity assessment daily.- Set and communicate daily mobility goal to care team and patient/family/caregiver. - Collaborate with rehabilitation services on mobility goals if consulted- Perform Range of Motion 3 times a day.- Reposition patient every 3 hours.- Dangle patient 3 times a day- Stand patient 3 times a day- Ambulate patient 3 times a day- Out of bed to chair 3 times a day - Out of bed for meals 3 times a day- Out of bed for toileting- Record patient progress and toleration of activity level   Outcome: Progressing     Problem: DISCHARGE PLANNING  Goal: Discharge to home or other facility with appropriate resources  Description: INTERVENTIONS:- Identify barriers to discharge w/patient and caregiver- Arrange for needed discharge resources and transportation as appropriate- Identify discharge learning  needs (meds, wound care, etc.)- Arrange for interpretive services to assist at discharge as needed- Refer to Case Management Department for coordinating discharge planning if the patient needs post-hospital services based on physician/advanced practitioner order or complex needs related to functional status, cognitive ability, or social support system  Outcome: Progressing     Problem: Knowledge Deficit  Goal: Patient/family/caregiver demonstrates understanding of disease process, treatment plan, medications, and discharge instructions  Description: Complete learning assessment and assess knowledge base.Interventions:- Provide teaching at level of understanding- Provide teaching via preferred learning methods  Outcome: Progressing

## 2025-04-05 NOTE — CASE MANAGEMENT
Case Management Assessment & Discharge Planning Note    Patient name Cristofer Wiley  Location / MRN [id number]  : 1965 Date 2025       Current Admission Date: 2025  Current Admission Diagnosis:Closed nondisplaced fracture of greater trochanter of right femur (HCC)   Patient Active Problem List    Diagnosis Date Noted Date Diagnosed    Closed nondisplaced fracture of greater trochanter of right femur (HCC) 2025     Fecal smearing 2025     Chronic obstructive pulmonary disease with acute exacerbation ([facility]) 2025     Chronic pain syndrome 2024     Lumbar spondylosis 2024     Primary osteoarthritis of knee 10/13/2023     Gastroesophageal reflux disease with esophagitis without hemorrhage 2023     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 ([facility]) 2023     Closed compression fracture of third lumbar vertebra ([facility]) 2023     Hypotension 2023     History of alcohol abuse 2023     Hypoglycemia 2023     Insomnia 2023     Moderate protein-calorie malnutrition (HCC) 2023     Esophageal abnormality 2023     Abnormal CXR 2023     Osteoporosis with current pathological fracture 2022     Coagulopathy ([facility]) 2022    Sacral fracture, closed ([facility]) 2022    Closed fracture of transverse process of lumbar vertebra ([facility]) 2022    Chronic anemia 2022     Clavicular fracture 2022     Low serum cortisol level 2022     Hypocalcemia 2022     Elevated d-dimer 2022     COVID-19 virus infection 2022     Thoracic compression fracture (HCC) 2022     Aortic ectasia ([facility]) 2022     Rib fractures 2022     Seizure disorder ([facility]) 2022     Hypoxia 2022     Traore's esophagus 2022     Ambulatory dysfunction 2021     Current every day smoker 2021     Fall 2021     Hx of duodenal ulcer 2021      Neck pain 12/24/2021     Acute on chronic pancreatitis (HCC) 11/22/2020     Pancreatic pseudocyst 11/22/2020     COPD (chronic obstructive pulmonary disease) (HCC) 11/22/2020     Ileus (HCC) 11/22/2020     Abnormal EKG 08/29/2020     Lesion of spleen 08/25/2020     Left anterior fascicular block 08/25/2020     Constipation 08/23/2020     Transaminitis 08/23/2020     Celiac artery stenosis (HCC) 08/22/2020     Pancreatic mass 08/22/2020     Pancytopenia (HCC) 08/22/2020     Tobacco use 08/22/2020     Traore esophagus      Duodenal ulcer 12/12/2019     Tubular adenoma 12/12/2019     Iron deficiency anemia due to chronic blood loss 10/22/2019     Closed fracture of left olecranon process, initial encounter 06/24/2019     Thrombocytopenia (HCC) 06/18/2019     Alcoholic cirrhosis of liver without ascites (HCC) 08/21/2018     Seizure-like activity (HCC) 05/22/2018     Diarrhea 05/22/2018     Abnormality of pancreatic duct 11/02/2017     Hx of seizure disorder 10/12/2017     Incisional hernia 10/08/2017     T6 vertebral fracture (HCC) 09/21/2017     Neuropathy involving both lower extremities 08/15/2017     Hyponatremia 07/29/2017     Hypokalemia 07/29/2017     Malnutrition of moderate degree (HCC) 05/18/2017     Generalized weakness 05/17/2017     Bladder wall thickening 03/09/2017     Hypophosphatemia 03/09/2017     Urinary retention 03/08/2017     Alcoholic hepatitis without ascites 03/07/2017     Vitamin D deficiency 03/07/2017     Iron deficiency 03/07/2017     Depression 02/08/2017     Elevated alkaline phosphatase level 01/14/2017     Hepatomegaly 01/12/2017     Hypomagnesemia 01/12/2017     Chronic hyponatremia 01/11/2017     Dietary folate deficiency anemia 01/11/2017     Ventral hernia without obstruction or gangrene 01/11/2017     Abdominal wound dehiscence 12/15/2016     Microcytic anemia 06/20/2016     Allergic rhinitis 06/07/2016     Tobacco abuse 05/22/2016     Essential hypertension 05/22/2016     H/O  suicide attempt 05/22/2016     H/O cervical spine surgery 05/22/2016     Left foot drop 10/31/2014     Peripheral neuropathy 10/31/2014     Cervical disc disorder with myelopathy 09/25/2014     Lumbar radiculopathy 09/25/2014     Cervical spinal stenosis 07/30/2014       LOS (days): 1  Geometric Mean LOS (GMLOS) (days):   Days to GMLOS:     OBJECTIVE:    Risk of Unplanned Readmission Score: 12.06         Current admission status: Inpatient  Referral Reason:  (discharge planning)    Preferred Pharmacy:   [id number] [email or web address]  [id number], [address]  [facility] [address]  Phone: [phone] Fax: [phone]    Primary Care Provider: Buddy Palacios DO    Primary Insurance: LUIS CASANOVA  Secondary Insurance:     ASSESSMENT:    CM met with patient at the bedside,baseline information  was obtained. CM discussed the role of CM in helping the patient develop a discharge plan and assist the patient in carry out their plan.      Patient resides with his younger brother 3 story home. Bed and bath on 2nd floor.    Baseline patient is independent with ambulation and adl's    Patient stated he was on dialysis in past only needed 3 treatments.      Active Health Care Proxies       Naomy Wiley Alternate Health Care Representative - Sister   Primary Phone: [phone] (Mobile)  Home Phone: [phone]                 Advance Directives  Does patient have a Health Care POA?: No  Was patient offered paperwork?: Yes  Does patient currently have a Health Care decision maker?: Yes, please see Health Care Proxy section  Does patient have Advance Directives?: No  Was patient offered paperwork?: Yes  Primary Contact: Naomy Wiley (Sister)  [phone] ()         Readmission Root Cause  30 Day Readmission: No    Patient Information  Admitted from:: Home  Mental Status: Alert  During Assessment patient was accompanied by: Not accompanied during assessment  Assessment information  provided by:: Patient  Primary Caregiver: Family  Caregiver's Name:: Naomy Wiley (Sister)  [phone] (M)  Caregiver's Relationship to Patient:: Family Member  Caregiver's Telephone Number:: Naomy Wiley (Sister)   [phone] (M)  Support Systems: Self, Family members (younger brother)  County of Residence: [facility] [place]  What [place] do you live in?: [place]  Home entry access options. Select all that apply.: Stairs  Number of steps to enter home.: 6  Do the steps have railings?: Yes  Type of Current Residence: [address] home  Upon entering residence, is there a bedroom on the main floor (no further steps)?: No  A bedroom is located on the following floor levels of residence (select all that apply):: 2nd Floor  Upon entering residence, is there a bathroom on the main floor (no further steps)?: No  Indicate which floors of current residence have a bathroom (select all the apply):: 2nd Floor  Number of steps to 2nd floor from main floor: One Flight  Is patient a ?: Yes  Is patient active with [facility] ( Affairs)?: No    Activities of Daily Living Prior to Admission  Functional Status: Independent  Completes ADLs independently?: Yes  Ambulates independently?: Yes  Does patient use assisted devices?: Yes  Assisted Devices (DME) used: Quad Cane  Does patient currently own DME?: Yes  What DME does the patient currently own?: Quad C[employer]  Does patient have a history of Outpatient Therapy (PT/OT)?: Yes  Does the patient have a history of Short-Term Rehab?: Yes ([facility])  Does patient have a history of HHC?: No  Does patient currently have HHC?: No         Patient Information Continued  Income Source: SSI/SSD  Does patient have prescription coverage?: Yes  Can the patient afford their medications and any related supplies (such as glucometers or test strips)?: Yes  Does patient receive dialysis treatments?: Yes  Does patient have a history of substance abuse?: No  Does patient have a history of Mental  Health Diagnosis?: No         Means of Transportation  Means [facility] Transport to [facility]:: Family transport      Social Determinants of Health (SDOH)      Flowsheet Row Most Recent Value   Housing Stability    In the last 12 months, was there a time when you were not able to pay the mortgage or rent on time? N   In the past 12 months, how many times have you moved where you were living? 1   At any time in the past 12 months, were you homeless or living in a shelter (including now)? N   Transportation Needs    In the past 12 months, has lack of transportation kept you from medical appointments or from getting medications? no   In the past 12 months, has lack of transportation kept you from meetings, work, or from getting things needed for daily living? No   Food Insecurity    Within the past 12 months, you worried that your food would run out before you got the money to buy more. Never true   Within the past 12 months, the food you bought just didn't last and you didn't have money to get more. Never true   Utilities    In the past 12 months has the electric, gas, oil, or water company threatened to shut off services in your home? No            DISCHARGE DETAILS:    Discharge planning discussed with:: patient  Freedom of Choice: Yes  Comments - Freedom of Choice: CM discussed  STR with patient if needed post hospital and patient in agreement with STR, preference is [facility] if needed. in agreement with referral in aidin         Contacts  Patient Contacts: Naomy Wiley (Sister)  [phone] (K)  Relationship to Patient:: Family  Contact Method: Phone  Phone Number: Naomy Wiley (Sister)   [phone] (Y)          Patient stated his preference would be [facility] if needed on discharge.    Pending Pt/Ot evvidhi.    CM to follow for discharge needs.

## 2025-04-05 NOTE — ASSESSMENT & PLAN NOTE
Chronic hyponatremia with a baseline serum sodium level of 125-130 mmol/L  Continue sodium chloride tablets, 3 grams PO BID  Follow the sodium level  Outpatient follow-up with Nephrology    Results from last 7 days   Lab Units 04/05/25  0546 04/04/25 2014   SODIUM mmol/L 127* 129*   POTASSIUM mmol/L 3.9 3.7   CHLORIDE mmol/L 91* 90*   CO2 mmol/L 27 29   BUN mg/dL 7 5   CREATININE mg/dL 0.63 0.59*   CALCIUM mg/dL 8.9 9.5

## 2025-04-05 NOTE — ASSESSMENT & PLAN NOTE
History of seizures  No reported seizure activities  --Continue with Keppra 750 mg p.o. twice daily  --Check Keppra levels  --Seizure precautions  --Continue outpatient neurology follow-up

## 2025-04-05 NOTE — ASSESSMENT & PLAN NOTE
59 year old male status post mechanical fall 4/4 with CT findings consistent with nondisplaced right greater trochanter fracture  WBAT RLE with ambulatory aids as needed. Limit right hip abduction  No acute orthopedic intervention indicated  Pain control and DVT prophylaxis per primary team  Out of bed  PT/OT evaluation  Follow up outpatient with ortho    This consult was completed with the attending on call, Dr Slade.

## 2025-04-05 NOTE — ASSESSMENT & PLAN NOTE
Resolved with magnesium sulfate 4 grams IV on 04/04/2025  Follow the magnesium level    Results from last 7 days   Lab Units 04/05/25  0546 04/04/25 2014   MAGNESIUM mg/dL 2.1 1.2*

## 2025-04-05 NOTE — PROGRESS NOTES
Progress Note - Hospitalist   Name: Cristofer Wiley 59 y.o. male I MRN: [id number]  Unit/Bed#:  I Date of Admission: 4/4/2025   Date of Service: 4/5/2025 I Hospital Day: 1    Assessment & Plan  Closed nondisplaced fracture of greater trochanter of right femur ([facility])  The patient presented with severe right hip pain and the inability to ambulate after a mechanical fall at home while playing with his dog.  I appreciate Orthopedic Surgery's input.  Per Orthopedic Surgery, the patient has a non-operative fracture and can be weightbearing as tolerated.  Incentive spirometry  DVT prophylaxis with Lovenox 40 mg SQ every 24 hours and SCD's on the bilateral lower extremities  Consult PT and OT    CT scan of the pelvis (04/04/2025):  IMPRESSION:     Nondisplaced right greater trochanter fracture.  Hypomagnesemia  Resolved with magnesium sulfate 4 grams IV on 04/04/2025  Follow the magnesium level    Results from last 7 days   Lab Units 04/05/25 0546 04/04/25 2014   MAGNESIUM mg/dL 2.1 1.2*       Hyponatremia  Chronic hyponatremia with a baseline serum sodium level of 125-130 mmol/L  Continue sodium chloride tablets, 3 grams PO BID  Follow the sodium level  Outpatient follow-up with Nephrology    Results from last 7 days   Lab Units 04/05/25 0546 04/04/25 2014   SODIUM mmol/L 127* 129*   POTASSIUM mmol/L 3.9 3.7   CHLORIDE mmol/L 91* 90*   CO2 mmol/L 27 29   BUN mg/dL 7 5   CREATININE mg/dL 0.63 0.59*   CALCIUM mg/dL 8.9 9.5       COPD (chronic obstructive pulmonary disease) ([facility])  Not in acute exacerbation  Continue his home inhaler regimen  Respiratory protocol  Outpatient follow-up with Pulmonology  Seizure disorder ([facility])  Continue PO Keppra  Outpatient follow-up with Neurology  Thrombocytopenia ([facility])  Monitor for any signs of bleeding  Follow the platelet count    VTE Pharmacologic Prophylaxis:   High Risk (Score >/= 5) - Pharmacological DVT Prophylaxis Ordered: enoxaparin (Lovenox). Sequential Compression Devices  Ordered.    Mobility:   Basic Mobility Inpatient Raw Score: 14  JH-HLM Goal: 4: Move to chair/commode  JH-HLM Achieved: 3: Sit at edge of bed  JH-HLM Goal NOT achieved. Continue with multidisciplinary rounding and encourage appropriate mobility to improve upon JH-HLM goals.    Patient Centered Rounds: I performed bedside rounds with nursing staff today.   Discussions with Specialists or Other Care Team Provider: I discussed the case with Orthopedic Surgery.    Current Length of Stay: 1 day(s)  Current Patient Status: Inpatient   Certification Statement: The patient will continue to require additional inpatient hospital stay due to the need for PT/OT evaluations, for pain control, and for post-acute care rehabilitation placement.  Discharge Plan: Anticipate discharge in 48-72 hrs to rehab facility.    Code Status: Level 1 - Full Code    Subjective   The patient was seen and examined.  The patient is experiencing severe right hip pain and right inguinal pain with the inability to ambulate at this time.      Objective :  Temp:  [98.1 °F (36.7 °C)-99 °F (37.2 °C)] 98.1 °F (36.7 °C)  HR:  [] 121  BP: (111-169)/(80-93) 111/80  Resp:  [16-21] 18  SpO2:  [89 %-96 %] 89 %  O2 Device: None (Room air)    Body mass index is 19.44 kg/m².     Input and Output Summary (last 24 hours):     Intake/Output Summary (Last 24 hours) at 4/5/2025 1347  Last data filed at 4/5/2025 1333  Gross per 24 hour   Intake 360 ml   Output 550 ml   Net -190 ml       Physical Exam  General:  NAD, follows commands  HEENT:  NC/AT, mucous membranes moist  Neck:  Supple, No JVP elevation  CV:  + S1, + S2, RRR  Pulm:  Lung fields are CTA bilaterally  Abd:  Soft, Non-tender, Non-distended  Ext:  No clubbing/cyanosis/edema  Skin:  No rashes  Neuro:  Awake, alert, oriented  Psych:  Normal mood and affect      Lines/Drains:              Lab Results: I have reviewed the following results:   Results from last 7 days   Lab Units 04/05/25  0546   WBC  Thousand/uL 4.73   HEMOGLOBIN g/dL 12.9   HEMATOCRIT % 38.7   PLATELETS Thousands/uL 119*   SEGS PCT % 69   LYMPHO PCT % 17   MONO PCT % 12   EOS PCT % 2     Results from last 7 days   Lab Units 04/05/25  0546   SODIUM mmol/L 127*   POTASSIUM mmol/L 3.9   CHLORIDE mmol/L 91*   CO2 mmol/L 27   BUN mg/dL 7   CREATININE mg/dL 0.63   ANION GAP mmol/L 9   CALCIUM mg/dL 8.9   ALBUMIN g/dL 3.8   TOTAL BILIRUBIN mg/dL 1.02*   ALK PHOS U/L 95   ALT U/L 18   AST U/L 36   GLUCOSE RANDOM mg/dL 91                       Recent Cultures (last 7 days):         Imaging Results Review: No pertinent imaging studies reviewed.  Other Study Results Review: No additional pertinent studies reviewed.    Last 24 Hours Medication List:     Current Facility-Administered Medications:     albuterol (PROVENTIL HFA,VENTOLIN HFA) inhaler 2 puff, Q6H PRN    calcium carbonate (OYSTER SHELL,OSCAL) 500 mg tablet 500 mg, Daily    Cholecalciferol (VITAMIN D3) tablet 1,000 Units, Daily    cyanocobalamin (VITAMIN B-12) tablet 100 mcg, Daily    doxepin (SINEquan) capsule 50 mg, HS    enoxaparin (LOVENOX) subcutaneous injection 40 mg, Q24H    folic acid (FOLVITE) tablet 1,000 mcg, Daily    HYDROmorphone HCl (DILAUDID) injection 0.2 mg, Q4H PRN    levETIRAcetam (KEPPRA) tablet 750 mg, Q12H    lisinopril (ZESTRIL) tablet 2.5 mg, Daily    nicotine (NICODERM CQ) 14 mg/24hr TD 24 hr patch 1 patch, Daily    omeprazole (PriLOSEC) delayed release capsule 40 mg, BID AC    ondansetron (ZOFRAN) injection 4 mg, Q6H PRN    oxyCODONE (ROXICODONE) immediate release tablet 10 mg, Q6H PRN    oxyCODONE (ROXICODONE) IR tablet 5 mg, Q6H PRN    pregabalin (LYRICA) capsule 100 mg, TID    sodium chloride tablet 3 g, BID    thiamine tablet 100 mg, Daily    umeclidinium-vilanterol 62.5-25 mcg/actuation inhaler 1 puff, Daily    Administrative Statements   Today, Patient Was Seen By: Td Salinas, DO  I have spent a total time of 35 minutes in caring for this patient on the day  of the visit/encounter including Diagnostic results, Prognosis, Risks and benefits of tx options, Instructions for management, Counseling / Coordination of care, and Documenting in the medical record.    **Please Note: This note may have been constructed using a voice recognition system.**

## 2025-04-05 NOTE — ASSESSMENT & PLAN NOTE
The patient presented with severe right hip pain and the inability to ambulate after a mechanical fall at home while playing with his dog.  I appreciate Orthopedic Surgery's input.  Per Orthopedic Surgery, the patient has a non-operative fracture and can be weightbearing as tolerated.  Incentive spirometry  DVT prophylaxis with Lovenox 40 mg SQ every 24 hours and SCD's on the bilateral lower extremities  Consult PT and OT    CT scan of the pelvis (04/04/2025):  IMPRESSION:     Nondisplaced right greater trochanter fracture.

## 2025-04-05 NOTE — RESPIRATORY THERAPY NOTE
04/05/25 1348   Respiratory Assessment   Resp Comments patient received on room air, he does c/o pain during cough in is hip, encouraged the deep breathing and coughing patient did well, loose congested npc but clearing   Oxygen Therapy/Pulse Ox   O2 Device None (Room air)   O2 Therapy Room air   SpO2 96 %   SpO2 Activity At Rest   $ Pulse Oximetry Spot Check Charge Completed

## 2025-04-05 NOTE — H&P
H&P - Hospitalist   Name: Cristofer Wiley 59 y.o. male I MRN: [id number]  Unit/Bed#: RM21 I Date of Admission: 4/4/2025   Date of Service: 4/4/2025 I Hospital Day: 0     Assessment & Plan  Closed nondisplaced fracture of greater trochanter of right femur (HCC)  Presented to the ER for evaluation of right hip pain  Patient reports a fall at home while playing with his dog  States that he was unable to get up on his own after the fall  CT shows  ER attending discussed case with on-call orthopedic surgery.  Orthopedic surgery determined the fracture would not require operation,  recommended admission nonweightbearing with possible placement for rehab  --Admitted to [facility]  --Keep nonweightbearing on right lower extremity  --Pain medication as needed  --Orthopedic surgery consult  Hypomagnesemia  Magnesium level at 1.2  --Magnesium replaced in the ER  --Monitor magnesium level  Hyponatremia  Sodium level at 129  Patient appears to have chronic sodium levels currently on sodium tablets  Follows with nephrology  --Check sodium urine random, osmolality urine  --Continue with sodium tablets  --Check a.m. CMP  --nephrology consult  Essential hypertension  Blood pressure is stable  --Continue PTA blood pressure medications  --Monitor blood pressure while admitted  COPD (chronic obstructive pulmonary disease) ([facility])  No respiratory distress or evidence of acute exacerbation  --Respiratory protocol  --Continue PTA COPD medication  --Monitor respiratory status, SPO2 while admitted  --Continue outpatient pulmonary follow-up  Seizure disorder ([facility])  History of seizures  No reported seizure activities  --Continue with Keppra 750 mg p.o. twice daily  --Check Keppra levels  --Seizure precautions  --Continue outpatient neurology follow-up  Gastroesophageal reflux disease with esophagitis without hemorrhage  Stable  --Continue Protonix while admitted      VTE Pharmacologic Prophylaxis:   Moderate Risk (Score 3-4) - Pharmacological DVT  Prophylaxis Ordered: heparin.  Code Status: Prior   Discussion with family: Patient declined call to .     Anticipated Length of Stay: Patient will be admitted on an inpatient basis with an anticipated length of stay of greater than 2 midnights secondary to closed fracture of right greater trochanter, hypomagnesemia, hyponatremia.    History of Present Illness   Chief Complaint: Right hip pain    Cristofer Wiley is a 59 y.o. male with a PMH of COPD, seizures, GERD, hypertension, who presents to the ER for evaluation complaint of right hip pain.  Patient reports that he was playing with his dog and he fell twisting motion.  Patient reports that was unable to stand or bear weight on his right leg after the incident.  Denies LOC or hitting his head, currently on any anticoagulation medication.  Patient had no other specific complaints.    Workup in the emergency room included labs significant for sodium of 129, chloride of 90, creatinine of 0.59, AST of 48, alk phos of 105, magnesium of 1.2, labs otherwise within normal limits.  CT pelvis shows a nondisplaced right greater trochanter fracture.  While in the emergency room patient received magnesium sulfate 2 g IV x 2, Toradol 30 mg IM, Dilaudid 1 mg IV, potassium chloride 40 mEq p.o.  ER attending discussed case with on-call PDX surgery.  Recommendation given to admit patient here at [facility], for possible placement for rehab injury is non-surgical.    Patient is being admitted on inpatient status/level care for further management of close fracture of the right greater trochanter, hypomagnesemia, hyponatremia    Review of Systems   Constitutional:  Positive for activity change. Negative for appetite change, fatigue and fever.   Eyes:  Negative for photophobia and visual disturbance.   Respiratory:  Negative for cough, chest tightness, shortness of breath and wheezing.    Cardiovascular:  Negative for palpitations and leg swelling.   Gastrointestinal:  Negative  for abdominal pain, diarrhea, nausea and vomiting.   Genitourinary:  Negative for difficulty urinating, dysuria, flank pain and hematuria.   Musculoskeletal:  Negative for arthralgias, back pain, myalgias, neck pain and neck stiffness.   Skin:  Negative for rash and wound.   Neurological:  Positive for dizziness. Negative for tremors, syncope, weakness and headaches.   Psychiatric/Behavioral:  Negative for agitation and confusion. The patient is not nervous/anxious.        Historical Information   Past Medical History:   Diagnosis Date    Alcohol abuse     Traore esophagus     Bowel obstruction (HCC)     Bowel perforation (HCC)     Cardiac disease     Continuous chronic alcoholism (HCC) 10/05/2017    Continuous chronic alcoholism (HCC) 10/05/2017    COPD (chronic obstructive pulmonary disease) (HCC)     History of shoulder surgery     Right shoulder    History of transfusion     Hx of cervical spine surgery     Hypertension     Incisional hernia 10/08/2017    MI, old     Mitral regurgitation     Psychiatric disorder     Seizures (HCC)      Past Surgical History:   Procedure Laterality Date    APPENDECTOMY      BACK SURGERY      CHOLECYSTECTOMY      ESOPHAGOGASTRODUODENOSCOPY N/A 11/28/2016    Procedure: ESOPHAGOGASTRODUODENOSCOPY (EGD);  Surgeon: Darryl Monroy MD;  Location:  GI LAB;  Service:     GALLBLADDER SURGERY      LAPAROTOMY N/A 10/25/2016    Procedure: LAPAROTOMY EXPLORATORY;  Surgeon: Renzo Harper MD;  Location: [facility];  Service:     MOUTH SURGERY      NERVE BLOCK Bilateral 2/15/2024    Procedure: Bilateral L4-5 and L5-S1 medial branch block #1;  Surgeon: Jonathan Garcia MD;  Location: [facility];  Service: Pain Management     NERVE BLOCK Bilateral 3/7/2024    Procedure: BLOCK MEDIAL BRANCH B/L L4-5 and L5-S1 MBB#2;  Surgeon: Jonathan Garcia MD;  Location: [facility];  Service: Pain Management     PERCUTANEOUS PINNING FEMORAL NECK FRACTURE      RADIOFREQUENCY ABLATION Left  4/3/2024    Procedure: Left L4-L5 and L5-S1 radiofrequency ablation;  Surgeon: Jnoathan Garcia MD;  Location: [facility];  Service: Pain Management     RADIOFREQUENCY ABLATION Right 4/26/2024    Procedure: Right L4-L5 and L5-S1 radiofrequency ablation;  Surgeon: Jonathan Garcia MD;  Location: [facility];  Service: Pain Management     SHOULDER SURGERY Right     SHOULDER SURGERY      SMALL INTESTINE SURGERY      STOMACH SURGERY      bal surgery     Social History     Tobacco Use    Smoking status: Every Day     Current packs/day: 1.00     Average packs/day: 1 pack/day for 35.0 years (35.0 ttl pk-yrs)     Types: Cigarettes    Smokeless tobacco: Never   Vaping Use    Vaping status: Never Used   Substance and Sexual Activity    Alcohol use: Not Currently     Alcohol/week: 42.0 standard drinks of alcohol     Types: 42 Cans of beer per week    Drug use: No    Sexual activity: Not Currently     Partners: Female     E-Cigarette/Vaping    E-Cigarette Use Never User      E-Cigarette/Vaping Substances    Nicotine No     THC No     CBD No     Flavoring No     Other No     Unknown No      Family History   Problem Relation Age of Onset    Breast cancer Mother     Prostate cancer Father     Skin cancer Brother      Social History:  Marital Status: Single   Occupation:   Patient Pre-hospital Living Situation: Home  Patient Pre-hospital Level of Mobility: walks  Patient Pre-hospital Diet Restrictions: None reported     Meds/Allergies   I have reviewed home medications using recent Epic encounter.  Prior to Admission medications    Medication Sig Start Date End Date Taking? Authorizing Provider   albuterol (PROVENTIL HFA,VENTOLIN HFA) 90 mcg/act inhaler Inhale 2 puffs every 6 (six) hours as needed for wheezing 1/7/25   Buddy Palacios DO   calcium carbonate (OS-KORI) 600 MG tablet Take 1 tablet (600 mg total) by mouth daily 1/7/25   Buddy Palacios DO   carbamide peroxide (DEBROX) 6.5 % otic solution Administer 5 drops to the right ear 2  (two) times a day 1/7/25   Buddy Palacios, DO   cyanocobalamin (VITAMIN B-12) 100 mcg tablet Take 1 tablet (100 mcg total) by mouth daily 1/7/25   Buddy Palacios, DO   doxepin (SINEquan) 50 mg capsule Take 1 capsule by mouth once daily at bedtime 3/6/25   Buddy Palacios, DO   folic acid (FOLVITE) 1 mg tablet Take 1 tablet by mouth once daily 4/2/25   Buddy Palacios, DO   levETIRAcetam (KEPPRA) 750 mg tablet Take 1 tablet (750 mg total) by mouth every 12 (twelve) hours 1/7/25   Buddy Palacios, DO   lisinopril (ZESTRIL) 2.5 mg tablet Take 1 tablet (2.5 mg total) by mouth daily 3/6/25   Buddy Palacios, DO   Multiple Vitamin (Tab-A-Rosa) TABS Take 1 tablet by mouth daily 1/7/25 2/17/25  Buddy Palacios, DO   omeprazole (PriLOSEC) 40 MG capsule Take 1 capsule by mouth twice daily 4/2/25   Buddy Palacios, DO   pregabalin (LYRICA) 100 mg capsule Take 1 capsule (100 mg total) by mouth 3 (three) times a day 2/17/25 5/18/25  Jonathan Garcia MD   sodium chloride 1 g tablet TAKE 3 TABLETS BY MOUTH TWICE DAILY 2/10/25   Buddy Palacios, DO   tamsulosin (FLOMAX) 0.4 mg Take 1 capsule (0.4 mg total) by mouth daily with dinner 1/7/25 2/17/25  Buddy Palacios, DO   thiamine (VITAMIN B1) 100 mg tablet Take 1 tablet (100 mg total) by mouth daily 1/7/25   Buddy Palacios, DO   umeclidinium-vilanterol (Anoro Ellipta) 62.5-25 mcg/actuation inhaler Inhale 1 puff daily 1/7/25   Buddy Palacios, DO   Vitamin D, Cholecalciferol, 25 MCG (1000 UT) TABS Take 1 tablet (1,000 Units total) by mouth daily 1/7/25   Buddy Palacios, DO     No Known Allergies    Objective :  Temp:  [99 °F (37.2 °C)] 99 °F (37.2 °C)  HR:  [91-93] 91  BP: (143-169)/(87-93) 169/93  Resp:  [16-20] 16  SpO2:  [94 %-96 %] 94 %  O2 Device: None (Room air)    Physical Exam  Constitutional:       General: He is not in acute distress.     Appearance: He is not ill-appearing.   HENT:      Head: Normocephalic and atraumatic.      Mouth/Throat:      Mouth: Mucous membranes are moist.      Pharynx: Oropharynx is clear.   Cardiovascular:      Rate and Rhythm:  Normal rate and regular rhythm.      Pulses: Normal pulses.   Pulmonary:      Effort: No respiratory distress.      Breath sounds: No wheezing, rhonchi or rales.   Abdominal:      General: There is no distension.      Palpations: Abdomen is soft.      Tenderness: There is no abdominal tenderness.   Musculoskeletal:         General: No swelling, tenderness or signs of injury.      Cervical back: Normal range of motion and neck supple.      Right lower leg: No edema.      Left lower leg: No edema.      Comments: Decreased range of motion to right hip, tenderness   Skin:     General: Skin is warm and dry.      Capillary Refill: Capillary refill takes less than 2 seconds.      Coloration: Skin is not jaundiced or pale.      Findings: No erythema, lesion or rash.   Neurological:      Mental Status: He is alert and oriented to person, place, and time.          Lines/Drains:            Lab Results: I have reviewed the following results:  Results from last 7 days   Lab Units 04/04/25 2014   WBC Thousand/uL 5.84   HEMOGLOBIN g/dL 13.5   HEMATOCRIT % 39.2   PLATELETS Thousands/uL 136*   SEGS PCT % 73   LYMPHO PCT % 14   MONO PCT % 11   EOS PCT % 1     Results from last 7 days   Lab Units 04/04/25 2014   SODIUM mmol/L 129*   POTASSIUM mmol/L 3.7   CHLORIDE mmol/L 90*   CO2 mmol/L 29   BUN mg/dL 5   CREATININE mg/dL 0.59*   ANION GAP mmol/L 10   CALCIUM mg/dL 9.5   ALBUMIN g/dL 4.3   TOTAL BILIRUBIN mg/dL 1.10*   ALK PHOS U/L 105*   ALT U/L 22   AST U/L 48*   GLUCOSE RANDOM mg/dL 94             Lab Results   Component Value Date    HGBA1C 5.0 02/26/2023    HGBA1C 5.0 05/21/2022    HGBA1C 5.2 02/02/2016           Imaging Results Review: I reviewed radiology reports from this admission including: CT pelvis without contrast and xray(s).  Other Study Results Review: No additional pertinent studies reviewed.    Administrative Statements   I have spent a total time of 40 minutes in caring for this patient on the day of the  visit/encounter including Documenting in the medical record, Reviewing/placing orders in the medical record (including tests, medications, and/or procedures), Obtaining or reviewing history  , and Communicating with other healthcare professionals .    ** Please Note: This note has been constructed using a voice recognition system. **

## 2025-04-05 NOTE — ASSESSMENT & PLAN NOTE
Presented to the ER for evaluation of right hip pain  Patient reports a fall at home while playing with his dog  States that he was unable to get up on his own after the fall  CT shows  ER attending discussed case with on-call orthopedic surgery.  Orthopedic surgery determined the fracture would not require operation,  recommended admission nonweightbearing with possible placement for rehab  --Admitted to [facility]  --Keep nonweightbearing on right lower extremity  --Pain medication as needed  --Orthopedic surgery consult

## 2025-04-05 NOTE — QUICK NOTE
I discussed the case with Dr. Antony Slade (Orthopedic Surgery).  The non-displaced right greater trochanter fracture will be treated non-operatively.  The patient can be weightbearing as tolerated.

## 2025-04-05 NOTE — CASE MANAGEMENT
Case Management Discharge Planning Note    Patient name Cristofer Wiley  Location / MRN [id number]  : 1965 Date 2025       Current Admission Date: 2025  Current Admission Diagnosis:Closed nondisplaced fracture of greater trochanter of right femur (HCC)   Patient Active Problem List    Diagnosis Date Noted Date Diagnosed    Closed nondisplaced fracture of greater trochanter of right femur (HCC) 2025     Fecal smearing 2025     Chronic obstructive pulmonary disease with acute exacerbation (HCC) 2025     Chronic pain syndrome 2024     Lumbar spondylosis 2024     Primary osteoarthritis of knee 10/13/2023     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     Closed compression fracture of third lumbar vertebra (HCC) 2023     Hypotension 2023     History of alcohol abuse 2023     Hypoglycemia 2023     Insomnia 2023     Moderate protein-calorie malnutrition (HCC) 2023     Esophageal abnormality 2023     Abnormal CXR 2023     Osteoporosis with current pathological fracture 2022     Coagulopathy ([facility]) 2022    Sacral fracture, closed ([facility]) 2022    Closed fracture of transverse process of lumbar vertebra ([facility]) 2022    Chronic anemia 2022     Clavicular fracture 2022     Low serum cortisol level 2022     Hypocalcemia 2022     Elevated d-dimer 2022     COVID-19 virus infection 2022     Thoracic compression fracture (HCC) 2022     Aortic ectasia ([facility]) 2022     Rib fractures 2022     Seizure disorder ([facility]) 2022     Hypoxia 2022     Traore's esophagus 2022     Ambulatory dysfunction 2021     Current every day smoker 2021     Fall 2021     Hx of duodenal ulcer 2021     Neck pain 2021     Acute on chronic pancreatitis (HCC) 2020     Pancreatic  pseudocyst 11/22/2020     COPD (chronic obstructive pulmonary disease) (HCC) 11/22/2020     Ileus (HCC) 11/22/2020     Abnormal EKG 08/29/2020     Lesion of spleen 08/25/2020     Left anterior fascicular block 08/25/2020     Constipation 08/23/2020     Transaminitis 08/23/2020     Celiac artery stenosis (HCC) 08/22/2020     Pancreatic mass 08/22/2020     Pancytopenia (HCC) 08/22/2020     Tobacco use 08/22/2020     Traore esophagus      Duodenal ulcer 12/12/2019     Tubular adenoma 12/12/2019     Iron deficiency anemia due to chronic blood loss 10/22/2019     Closed fracture of left olecranon process, initial encounter 06/24/2019     Thrombocytopenia (HCC) 06/18/2019     Alcoholic cirrhosis of liver without ascites (HCC) 08/21/2018     Seizure-like activity (HCC) 05/22/2018     Diarrhea 05/22/2018     Abnormality of pancreatic duct 11/02/2017     Hx of seizure disorder 10/12/2017     Incisional hernia 10/08/2017     T6 vertebral fracture (HCC) 09/21/2017     Neuropathy involving both lower extremities 08/15/2017     Hyponatremia 07/29/2017     Hypokalemia 07/29/2017     Malnutrition of moderate degree (HCC) 05/18/2017     Generalized weakness 05/17/2017     Bladder wall thickening 03/09/2017     Hypophosphatemia 03/09/2017     Urinary retention 03/08/2017     Alcoholic hepatitis without ascites 03/07/2017     Vitamin D deficiency 03/07/2017     Iron deficiency 03/07/2017     Depression 02/08/2017     Elevated alkaline phosphatase level 01/14/2017     Hepatomegaly 01/12/2017     Hypomagnesemia 01/12/2017     Chronic hyponatremia 01/11/2017     Dietary folate deficiency anemia 01/11/2017     Ventral hernia without obstruction or gangrene 01/11/2017     Abdominal wound dehiscence 12/15/2016     Microcytic anemia 06/20/2016     Allergic rhinitis 06/07/2016     Tobacco abuse 05/22/2016     H/O suicide attempt 05/22/2016     H/O cervical spine surgery 05/22/2016     Left foot drop 10/31/2014     Peripheral neuropathy  10/31/2014     Cervical disc disorder with myelopathy 09/25/2014     Lumbar radiculopathy 09/25/2014     Cervical spinal stenosis 07/30/2014       LOS (days): 1  Geometric Mean LOS (GMLOS) (days):   Days to GMLOS:     OBJECTIVE:  Risk of Unplanned Readmission Score: 12.1         Current admission status: Inpatient   Preferred Pharmacy:   [facility] [address] [place] [address], [address]  [address] [place] [address], [address] [facility] [address]  Phone: [phone] Fax: [phone]    Primary Care Provider: Buddy Palacios DO    Primary Insurance: [employer]  Secondary Insurance:     DISCHARGE DETAILS:     CM discussed  FOC with patient at bedside. Patient agreeable with blanket referrals in Aidin for local facilities  to determine bed availability according to your medical needs and insurance coverage. Patient  has no preference     Referrals placed in aidin for  Acute rehab and STR.

## 2025-04-05 NOTE — RESPIRATORY THERAPY NOTE
RT Protocol Note  Cristofer Wiley 59 y.o. male MRN: [id number]  Unit/Bed#:  Encounter: [id number]    Assessment    Principal Problem:    Closed nondisplaced fracture of greater trochanter of right femur (HCC)  Active Problems:    Essential hypertension    Hypomagnesemia    Hyponatremia    COPD (chronic obstructive pulmonary disease) (HCC)    Seizure disorder (HCC)    Gastroesophageal reflux disease with esophagitis without hemorrhage      Home Pulmonary Medications:    Home Devices/Therapy: Other (Comment) (Patient denies PAP therapy for CHRISTIAN, denies any home O2.  He did state that he has Anoro prescribed at home for mild COPD, but states that he rarely ever feels the need to use it.)    Past Medical History:   Diagnosis Date    Alcohol abuse     Traore esophagus     Bowel obstruction ([facility])     Bowel perforation ([facility])     Cardiac disease     Continuous chronic alcoholism ([facility]) 10/05/2017    Continuous chronic alcoholism ([facility]) 10/05/2017    COPD (chronic obstructive pulmonary disease) ([facility])     History of shoulder surgery     Right shoulder    History of transfusion     Hx of cervical spine surgery     Hypertension     Incisional hernia 10/08/2017    MI, old     Mitral regurgitation     Psychiatric disorder     Seizures ([facility])      Social History     Socioeconomic History    Marital status: Single     Spouse name: None    Number of children: None    Years of education: None    Highest education level: None   Occupational History    None   Tobacco Use    Smoking status: Every Day     Current packs/day: 1.00     Average packs/day: 1 pack/day for 35.0 years (35.0 ttl pk-yrs)     Types: Cigarettes    Smokeless tobacco: Never   Vaping Use    Vaping status: Never Used   Substance and Sexual Activity    Alcohol use: Not Currently     Alcohol/week: 42.0 standard drinks of alcohol     Types: 42 Cans of beer per week    Drug use: No    Sexual activity: Not Currently     Partners: Female   Other Topics Concern    None    Social History Narrative    None     Social Drivers of Health     Financial Resource Strain: Not on file   Food Insecurity: No Food Insecurity (2025)    Nursing - Inadequate Food Risk Classification     Worried About Running Out of Food in the Last Year: Not on file     Ran Out of Food in the Last Year: Not on file     Ran Out of Food in the Last Year: Never true   Transportation Needs: No Transportation Needs (2025)    Nursing - Transportation Risk Classification     Lack of Transportation: Not on file     Lack of Transportation: No   Physical Activity: Not on file   Stress: Not on file   Social Connections: Unknown (2024)    Received from [employer]     How often do you feel lonely or isolated from those around you? (Adult - for ages 18 years and over): Not on file   Intimate Partner Violence: Unknown (2025)    Nursing IPS     Feels Physically and Emotionally Safe: Not on file     Physically Hurt by Someone: Not on file     Humiliated or Emotionally Abused by Someone: Not on file     Physically Hurt by Someone: No     Hurt or Threatened by Someone: No   Housing Stability: Unknown (2025)    Nursing: Inadequate Housing Risk Classification     Has Housing: Not on file     Worried About Losing Housing: Not on file     Unable to Get Utilities: Not on file     Unable to Pay for Housing in the Last Year: No     Has Housin       Subjective     Patient assessed per RT protocol.  He presented to the ER following a fall injury that occurred while playing with his dog.  Patient with a PMH of Mild COPD.  He does endorse that he still smokes a pack and a half per day.  Patient has no complaints about his breathing, and no distress noted upon my assessment.  Patient is resting comfortably on room air.  Breath sounds are clear and diminished in the bases, with no wheezing present at this time.  He does have a loose/congested cough, which has been non-productive.  I instructed the  "patient on the proper use of Flutter and IS, with good return demonstration.  Will continue forward with PRN Albuterol inh and airway clearance maneuvers.      Objective    Physical Exam:   Assessment Type: Assess only  General Appearance: Alert, Awake  Respiratory Pattern: Normal  Chest Assessment: Chest expansion symmetrical  Bilateral Breath Sounds: Diminished (Cough congestion)  Cough: Non-productive, Congested, Strong    Vitals:  Blood pressure 142/91, pulse 92, temperature 98.2 °F (36.8 °C), temperature source Temporal, resp. rate 16, height 5' 5\" (1.651 m), weight 53 kg (116 lb 13.5 oz), SpO2 96%.          Imaging and other studies: Results Review Statement: I personally reviewed the following image studies/reports in PACS and discussed pertinent findings with Radiology: CT abdomen/pelvis. My interpretation of the radiology images/reports is:  .          Plan    Respiratory Plan: No distress/Pulmonary history  Airway Clearance Plan: Incentive Spirometer, Flutter     Resp Comments: Patient assessed per RT protocol   "

## 2025-04-05 NOTE — ASSESSMENT & PLAN NOTE
Sodium level at 129  Patient appears to have chronic sodium levels currently on sodium tablets  Follows with nephrology  --Check sodium urine random, osmolality urine  --Continue with sodium tablets  --Check a.m. CMP  --nephrology consult

## 2025-04-05 NOTE — ASSESSMENT & PLAN NOTE
No respiratory distress or evidence of acute exacerbation  --Respiratory protocol  --Continue PTA COPD medication  --Monitor respiratory status, SPO2 while admitted  --Continue outpatient pulmonary follow-up

## 2025-04-05 NOTE — PLAN OF CARE
Problem: PAIN - ADULT  Goal: Verbalizes/displays adequate comfort level or baseline comfort level  Description: Interventions:- Encourage patient to monitor pain and request assistance- Assess pain using appropriate pain scale- Administer analgesics based on type and severity of pain and evaluate response- Implement non-pharmacological measures as appropriate and evaluate response- Consider cultural and social influences on pain and pain management- Notify physician/advanced practitioner if interventions unsuccessful or patient reports new pain  Outcome: Progressing     Problem: INFECTION - ADULT  Goal: Absence or prevention of progression during hospitalization  Description: INTERVENTIONS:- Assess and monitor for signs and symptoms of infection- Monitor lab/diagnostic results- Monitor all insertion sites, i.e. indwelling lines, tubes, and drains- Monitor endotracheal if appropriate and nasal secretions for changes in amount and color- [place] appropriate cooling/warming therapies per order- Administer medications as ordered- Instruct and encourage patient and family to use good hand hygiene technique- Identify and instruct in appropriate isolation precautions for identified infection/condition  Outcome: Progressing  Goal: Absence of fever/infection during neutropenic period  Description: INTERVENTIONS:- Monitor WBC  Outcome: Progressing     Problem: SAFETY ADULT  Goal: Patient will remain free of falls  Description: INTERVENTIONS:- Educate patient/family on patient safety including physical limitations- Instruct patient to call for assistance with activity - Consult OT/PT to assist with strengthening/mobility - Keep Call bell within reach- Keep bed low and locked with side rails adjusted as appropriate- Keep care items and personal belongings within reach- Initiate and maintain comfort rounds- Make Fall Risk Sign visible to staff- Offer Toileting every 2 Hours, in advance of need- Initiate/Maintain  bedalarm-  Obtain necessary fall risk management equipment: alarms- Apply yellow socks and bracelet for high fall risk patients- Consider moving patient to room near nurses station  Outcome: Progressing  Goal: Maintain or return to baseline ADL function  Description: INTERVENTIONS:-  Assess patient's ability to carry out ADLs; assess patient's baseline for ADL function and identify physical deficits which impact ability to perform ADLs (bathing, care of mouth/teeth, toileting, grooming, dressing, etc.)- Assess/evaluate cause of self-care deficits - Assess range of motion- Assess patient's mobility; develop plan if impaired- Assess patient's need for assistive devices and provide as appropriate- Encourage maximum independence but intervene and supervise when necessary- Involve family in performance of ADLs- Assess for home care needs following discharge - Consider OT consult to assist with ADL evaluation and planning for discharge- Provide patient education as appropriate  Outcome: Progressing  Goal: Maintains/Returns to pre admission functional level  Description: INTERVENTIONS:- Perform AM-PAC 6 Click Basic Mobility/ Daily Activity assessment daily.- Set and communicate daily mobility goal to care team and patient/family/caregiver. - Collaborate with rehabilitation services on mobility goals if consulted- Perform Range of Motion 3 times a day.- Reposition patient every 2 hours.- Dangle patient 3 times a day- Stand patient 3 times a day- Ambulate patient 3 times a day- Out of bed to chair 3 times a day - Out of bed for meals 3 times a day- Out of bed for toileting- Record patient progress and toleration of activity level   Outcome: Progressing     Problem: DISCHARGE PLANNING  Goal: Discharge to home or other facility with appropriate resources  Description: INTERVENTIONS:- Identify barriers to discharge w/patient and caregiver- Arrange for needed discharge resources and transportation as appropriate- Identify discharge  learning needs (meds, wound care, etc.)- Arrange for interpretive services to assist at discharge as needed- Refer to Case Management Department for coordinating discharge planning if the patient needs post-hospital services based on physician/advanced practitioner order or complex needs related to functional status, cognitive ability, or social support system  Outcome: Progressing     Problem: Knowledge Deficit  Goal: Patient/family/caregiver demonstrates understanding of disease process, treatment plan, medications, and discharge instructions  Description: Complete learning assessment and assess knowledge base.Interventions:- Provide teaching at level of understanding- Provide teaching via preferred learning methods  Outcome: Progressing

## 2025-04-05 NOTE — ASSESSMENT & PLAN NOTE
Not in acute exacerbation  Continue his home inhaler regimen  Respiratory protocol  Outpatient follow-up with Pulmonology

## 2025-04-05 NOTE — CONSULTS
Consultation - Orthopedics   Name: Cristofer Wiley 59 y.o. male I MRN: [id number]  Unit/Bed#:  I Date of Admission: 4/4/2025   Date of Service: 4/5/2025 I Hospital Day: 1   Inpatient consult to Orthopedic Surgery  Consult performed by: Damian Hobbs PA-C  Consult ordered by: Eusebio Kim PA-C        Physician Requesting Evaluation: Td Salinas DO   Reason for Evaluation / Principal Problem: right hip pain    Assessment & Plan  Closed nondisplaced fracture of greater trochanter of right femur (HCC)  59 year old male status post mechanical fall 4/4 with CT findings consistent with nondisplaced right greater trochanter fracture  WBAT RLE with ambulatory aids as needed. Limit right hip abduction  No acute orthopedic intervention indicated  Pain control and DVT prophylaxis per primary team  Out of bed  PT/OT evaluation  Follow up outpatient with ortho    This consult was completed with the attending on call, Dr Slade.  Essential hypertension    Hypomagnesemia    Hyponatremia    COPD (chronic obstructive pulmonary disease) (HCC)    Seizure disorder (HCC)    Gastroesophageal reflux disease with esophagitis without hemorrhage    Orthopedics service signing off. Please reach out with any questions or concerns.    History of Present Illness   HPI: Cristofer Wiley is a 59 y.o. male community ambulator status post mechanical fall from standing complaining of right hip pain. Patient states he was playing with his dog when he twisted to the right and tripped on himself, then landed on his right hip. Patient was unable to ambulate after the injury. Patient was evaluated in the emergency department where CT showed a nondisplaced right greater trochanter fracture. Patient was admitted to [facility] and an orthopedics consult was placed. Patient lives with his brother at home in a 3 story house. Patient is on disability. Patient denies Headstrike, denies LOC, denies home Blood thinners. Pain is sharp in character, Located  over the right lateral hip and groin, acute in onset, constant in duration, severe in intensity. Exacerbating factors weight bearing, hip range of motion. Remitting factors include rest. Patient states the pain radiates to the anterior knee. He does complain of right knee pain that has been ongoing for over 30 years. He complains of some numbness and tingling over the anterior knee down to the ankle. Patient otherwise feels well and denies any fevers, chills, chest pain, shortness of breath, nausea, vomiting. Patient denies any hip pain prior to injury. Prior surgery on extremity: right knee ACL and meniscus surgery. Past medical history significant for GERD, hypertension, COPD, seizures, ETOH/tobacco abuse, neuropathy, chronic back pain with L4-L5 and L5-S1 radiofrequency ablation in 2024. Patient states he was mobilized by physical therapy this morning (4/5) and was able to ambulate 3-4 steps with a walker. He noted significant pain with weight bearing on the right lower extremity.        Review of Systems significant for findings described in the HPI.  Historical Information   Past Medical History:   Diagnosis Date    Alcohol abuse     Traore esophagus     Bowel obstruction (HCC)     Bowel perforation (HCC)     Cardiac disease     Continuous chronic alcoholism (HCC) 10/05/2017    Continuous chronic alcoholism (HCC) 10/05/2017    COPD (chronic obstructive pulmonary disease) (HCC)     History of shoulder surgery     Right shoulder    History of transfusion     Hx of cervical spine surgery     Hypertension     Incisional hernia 10/08/2017    MI, old     Mitral regurgitation     Psychiatric disorder     Seizures (HCC)      Past Surgical History:   Procedure Laterality Date    APPENDECTOMY      BACK SURGERY      CHOLECYSTECTOMY      ESOPHAGOGASTRODUODENOSCOPY N/A 11/28/2016    Procedure: ESOPHAGOGASTRODUODENOSCOPY (EGD);  Surgeon: Darryl Monroy MD;  Location: [facility];  Service:     GALLBLADDER SURGERY       LAPAROTOMY N/A 10/25/2016    Procedure: LAPAROTOMY EXPLORATORY;  Surgeon: Renzo Harper MD;  Location: [facility];  Service:     MOUTH SURGERY      NERVE BLOCK Bilateral 2/15/2024    Procedure: Bilateral L4-5 and L5-S1 medial branch block #1;  Surgeon: Jonathan Garcia MD;  Location: [facility];  Service: Pain Management     NERVE BLOCK Bilateral 3/7/2024    Procedure: BLOCK MEDIAL BRANCH B/L L4-5 and L5-S1 MBB#2;  Surgeon: Jonathan Garcia MD;  Location: [facility];  Service: Pain Management     PERCUTANEOUS PINNING FEMORAL NECK FRACTURE      RADIOFREQUENCY ABLATION Left 4/3/2024    Procedure: Left L4-L5 and L5-S1 radiofrequency ablation;  Surgeon: Jonathan Garcia MD;  Location: [facility];  Service: Pain Management     RADIOFREQUENCY ABLATION Right 4/26/2024    Procedure: Right L4-L5 and L5-S1 radiofrequency ablation;  Surgeon: Jonathan Garcia MD;  Location: [facility];  Service: Pain Management     SHOULDER SURGERY Right     SHOULDER SURGERY      SMALL INTESTINE SURGERY      STOMACH SURGERY      bal surgery     Social History     Tobacco Use    Smoking status: Every Day     Current packs/day: 1.00     Average packs/day: 1 pack/day for 35.0 years (35.0 ttl pk-yrs)     Types: Cigarettes    Smokeless tobacco: Never   Vaping Use    Vaping status: Never Used   Substance and Sexual Activity    Alcohol use: Not Currently     Alcohol/week: 42.0 standard drinks of alcohol     Types: 42 Cans of beer per week    Drug use: No    Sexual activity: Not Currently     Partners: Female     E-Cigarette/Vaping    E-Cigarette Use Never User      E-Cigarette/Vaping Substances    Nicotine No     THC No     CBD No     Flavoring No     Other No     Unknown No      Family History   Problem Relation Age of Onset    Breast cancer Mother     Prostate cancer Father     Skin cancer Brother        Objective :  Temp:  [98.1 °F (36.7 °C)-99 °F (37.2 °C)] 98.1 °F (36.7 °C)  HR:  [] 121  BP: (111-169)/(80-93) 111/80  Resp:   [16-21] 18  SpO2:  [89 %-96 %] 89 %  O2 Device: None (Room air)  Physical ExamOrtho Exam   Musculoskeletal: Right lower extremity  Skin intact. Mild swelling over lateral hip  Tender to palpation over right greater trochanter and right groin  Pain with log roll  ROM limited due to known fracture  Unable to perform active straight leg raise  Sensation intact L3-S1. SILT SPN, DPN, sural, saphenous, tibial nerve distributions  Intact ankle dorsi/plantar flexion, EHL/FHL  2+ DP/ PT pulse, brisk cap refill, toes WWP  Musculature is soft and compressible, no pain with passive stretch  Leg lengths appear equal  Neurovascular intact    Physical Exam   Constitutional: Appears well-developed and well-nourished. No distress.   HENT:   Head: Normocephalic.   Eyes: Conjunctivae are normal. Right eye exhibits no discharge. Left eye exhibits no discharge. No scleral icterus.   Cardiovascular: Normal rate.    Pulmonary/Chest: Effort normal.   Neurological: Alert and oriented to person, place, and time.   Skin: Skin is warm and dry. No rash noted. The patient is not diaphoretic. No erythema. No pallor.   Psychiatric: Normal mood and affect. Behavior is normal. Judgment and thought content normal.         Lab Results: I have reviewed the following results:   Recent Labs     04/04/25 2014 04/05/25  0546   WBC 5.84 4.73   HGB 13.5 12.9   HCT 39.2 38.7   * 119*   BUN 5 7   CREATININE 0.59* 0.63     Blood Culture:   Lab Results   Component Value Date    BLOODCX No Growth After 5 Days. 09/13/2019    BLOODCX No Growth After 5 Days. 09/13/2019     Wound Culture:   Lab Results   Component Value Date    WOUNDCULT (A) 06/02/2018     Growth in Broth culture only Staphylococcus capitis       Imaging Results Review: I reviewed radiology reports from this admission including: CT pelvis and Xrays right hip.  I agree with the following radiology report:  Nondisplaced right greater trochanter fracture.   Other Study Results Review: No  additional pertinent studies reviewed.

## 2025-04-06 LAB
25(OH)D3 SERPL-MCNC: 31.5 NG/ML (ref 30–100)
ALBUMIN SERPL BCG-MCNC: 3.4 G/DL (ref 3.5–5)
ALP SERPL-CCNC: 90 U/L (ref 34–104)
ALT SERPL W P-5'-P-CCNC: 14 U/L (ref 7–52)
ANION GAP SERPL CALCULATED.3IONS-SCNC: 5 MMOL/L (ref 4–13)
AST SERPL W P-5'-P-CCNC: 27 U/L (ref 13–39)
BASOPHILS # BLD AUTO: 0.02 THOUSANDS/ÂΜL (ref 0–0.1)
BASOPHILS NFR BLD AUTO: 0 % (ref 0–1)
BILIRUB SERPL-MCNC: 0.72 MG/DL (ref 0.2–1)
BUN SERPL-MCNC: 11 MG/DL (ref 5–25)
CALCIUM ALBUM COR SERPL-MCNC: 9 MG/DL (ref 8.3–10.1)
CALCIUM SERPL-MCNC: 8.5 MG/DL (ref 8.4–10.2)
CHLORIDE SERPL-SCNC: 93 MMOL/L (ref 96–108)
CK SERPL-CCNC: 102 U/L (ref 39–308)
CO2 SERPL-SCNC: 29 MMOL/L (ref 21–32)
CREAT SERPL-MCNC: 0.79 MG/DL (ref 0.6–1.3)
EOSINOPHIL # BLD AUTO: 0.13 THOUSAND/ÂΜL (ref 0–0.61)
EOSINOPHIL NFR BLD AUTO: 3 % (ref 0–6)
ERYTHROCYTE [DISTWIDTH] IN BLOOD BY AUTOMATED COUNT: 15 % (ref 11.6–15.1)
FOLATE SERPL-MCNC: 20.3 NG/ML
GFR SERPL CREATININE-BSD FRML MDRD: 98 ML/MIN/1.73SQ M
GLUCOSE SERPL-MCNC: 98 MG/DL (ref 65–140)
HCT VFR BLD AUTO: 35.7 % (ref 36.5–49.3)
HGB BLD-MCNC: 11.9 G/DL (ref 12–17)
IMM GRANULOCYTES # BLD AUTO: 0.01 THOUSAND/UL (ref 0–0.2)
IMM GRANULOCYTES NFR BLD AUTO: 0 % (ref 0–2)
LYMPHOCYTES # BLD AUTO: 1.3 THOUSANDS/ÂΜL (ref 0.6–4.47)
LYMPHOCYTES NFR BLD AUTO: 25 % (ref 14–44)
MAGNESIUM SERPL-MCNC: 1.5 MG/DL (ref 1.9–2.7)
MCH RBC QN AUTO: 30.9 PG (ref 26.8–34.3)
MCHC RBC AUTO-ENTMCNC: 33.3 G/DL (ref 31.4–37.4)
MCV RBC AUTO: 93 FL (ref 82–98)
MONOCYTES # BLD AUTO: 0.75 THOUSAND/ÂΜL (ref 0.17–1.22)
MONOCYTES NFR BLD AUTO: 14 % (ref 4–12)
NEUTROPHILS # BLD AUTO: 3.06 THOUSANDS/ÂΜL (ref 1.85–7.62)
NEUTS SEG NFR BLD AUTO: 58 % (ref 43–75)
NRBC BLD AUTO-RTO: 0 /100 WBCS
PHOSPHATE SERPL-MCNC: 2.5 MG/DL (ref 2.7–4.5)
PLATELET # BLD AUTO: 111 THOUSANDS/UL (ref 149–390)
PMV BLD AUTO: 10.2 FL (ref 8.9–12.7)
POTASSIUM SERPL-SCNC: 4 MMOL/L (ref 3.5–5.3)
PROCALCITONIN SERPL-MCNC: 0.06 NG/ML
PROT SERPL-MCNC: 6.4 G/DL (ref 6.4–8.4)
RBC # BLD AUTO: 3.85 MILLION/UL (ref 3.88–5.62)
SODIUM SERPL-SCNC: 127 MMOL/L (ref 135–147)
VIT B12 SERPL-MCNC: 239 PG/ML (ref 180–914)
WBC # BLD AUTO: 5.27 THOUSAND/UL (ref 4.31–10.16)

## 2025-04-06 PROCEDURE — 82550 ASSAY OF CK (CPK): CPT | Performed by: INTERNAL MEDICINE

## 2025-04-06 PROCEDURE — 80074 ACUTE HEPATITIS PANEL: CPT | Performed by: INTERNAL MEDICINE

## 2025-04-06 PROCEDURE — 85025 COMPLETE CBC W/AUTO DIFF WBC: CPT | Performed by: INTERNAL MEDICINE

## 2025-04-06 PROCEDURE — 84100 ASSAY OF PHOSPHORUS: CPT | Performed by: INTERNAL MEDICINE

## 2025-04-06 PROCEDURE — 82607 VITAMIN B-12: CPT | Performed by: INTERNAL MEDICINE

## 2025-04-06 PROCEDURE — 80053 COMPREHEN METABOLIC PANEL: CPT | Performed by: INTERNAL MEDICINE

## 2025-04-06 PROCEDURE — 84145 PROCALCITONIN (PCT): CPT | Performed by: INTERNAL MEDICINE

## 2025-04-06 PROCEDURE — 83735 ASSAY OF MAGNESIUM: CPT | Performed by: INTERNAL MEDICINE

## 2025-04-06 PROCEDURE — 82306 VITAMIN D 25 HYDROXY: CPT | Performed by: INTERNAL MEDICINE

## 2025-04-06 PROCEDURE — 99232 SBSQ HOSP IP/OBS MODERATE 35: CPT | Performed by: INTERNAL MEDICINE

## 2025-04-06 PROCEDURE — 82746 ASSAY OF FOLIC ACID SERUM: CPT | Performed by: INTERNAL MEDICINE

## 2025-04-06 PROCEDURE — 87389 HIV-1 AG W/HIV-1&-2 AB AG IA: CPT | Performed by: INTERNAL MEDICINE

## 2025-04-06 RX ORDER — MAGNESIUM SULFATE HEPTAHYDRATE 40 MG/ML
2 INJECTION, SOLUTION INTRAVENOUS ONCE
Status: COMPLETED | OUTPATIENT
Start: 2025-04-06 | End: 2025-04-07

## 2025-04-06 RX ORDER — HYDROMORPHONE HCL/PF 1 MG/ML
0.5 SYRINGE (ML) INJECTION EVERY 4 HOURS PRN
Status: DISCONTINUED | OUTPATIENT
Start: 2025-04-06 | End: 2025-04-08 | Stop reason: HOSPADM

## 2025-04-06 RX ADMIN — PREGABALIN 100 MG: 50 CAPSULE ORAL at 22:15

## 2025-04-06 RX ADMIN — UMECLIDINIUM BROMIDE AND VILANTEROL TRIFENATATE 1 PUFF: 62.5; 25 POWDER RESPIRATORY (INHALATION) at 10:01

## 2025-04-06 RX ADMIN — THIAMINE HCL TAB 100 MG 100 MG: 100 TAB at 09:59

## 2025-04-06 RX ADMIN — OXYCODONE HYDROCHLORIDE 10 MG: 10 TABLET ORAL at 18:22

## 2025-04-06 RX ADMIN — LEVETIRACETAM 750 MG: 250 TABLET, FILM COATED ORAL at 22:43

## 2025-04-06 RX ADMIN — PREGABALIN 100 MG: 50 CAPSULE ORAL at 09:59

## 2025-04-06 RX ADMIN — FOLIC ACID 1000 MCG: 1 TABLET ORAL at 09:59

## 2025-04-06 RX ADMIN — OMEPRAZOLE 40 MG: 20 CAPSULE, DELAYED RELEASE ORAL at 18:14

## 2025-04-06 RX ADMIN — HYDROMORPHONE HYDROCHLORIDE 0.5 MG: 1 INJECTION, SOLUTION INTRAMUSCULAR; INTRAVENOUS; SUBCUTANEOUS at 20:02

## 2025-04-06 RX ADMIN — MAGNESIUM SULFATE HEPTAHYDRATE 2 G: 40 INJECTION, SOLUTION INTRAVENOUS at 11:57

## 2025-04-06 RX ADMIN — OMEPRAZOLE 40 MG: 20 CAPSULE, DELAYED RELEASE ORAL at 05:55

## 2025-04-06 RX ADMIN — CALCIUM 500 MG: 500 TABLET ORAL at 09:59

## 2025-04-06 RX ADMIN — VITAM B12 100 MCG: 100 TAB at 10:00

## 2025-04-06 RX ADMIN — SODIUM CHLORIDE 3 G: 1 TABLET ORAL at 10:01

## 2025-04-06 RX ADMIN — OXYCODONE HYDROCHLORIDE 10 MG: 10 TABLET ORAL at 22:22

## 2025-04-06 RX ADMIN — Medication 1000 UNITS: at 09:59

## 2025-04-06 RX ADMIN — LEVETIRACETAM 750 MG: 250 TABLET, FILM COATED ORAL at 11:56

## 2025-04-06 RX ADMIN — DOXEPIN HYDROCHLORIDE 50 MG: 25 CAPSULE ORAL at 22:15

## 2025-04-06 RX ADMIN — Medication 2 TABLET: at 11:57

## 2025-04-06 RX ADMIN — ENOXAPARIN SODIUM 40 MG: 40 INJECTION SUBCUTANEOUS at 22:15

## 2025-04-06 RX ADMIN — SODIUM CHLORIDE 3 G: 1 TABLET ORAL at 22:15

## 2025-04-06 RX ADMIN — PREGABALIN 100 MG: 50 CAPSULE ORAL at 18:14

## 2025-04-06 RX ADMIN — HYDROMORPHONE HYDROCHLORIDE 0.5 MG: 1 INJECTION, SOLUTION INTRAMUSCULAR; INTRAVENOUS; SUBCUTANEOUS at 09:59

## 2025-04-06 RX ADMIN — LISINOPRIL 2.5 MG: 5 TABLET ORAL at 09:59

## 2025-04-06 RX ADMIN — OXYCODONE HYDROCHLORIDE 10 MG: 10 TABLET ORAL at 05:57

## 2025-04-06 NOTE — ASSESSMENT & PLAN NOTE
Resolved with magnesium sulfate 4 grams IV on 04/04/2025  Follow the magnesium level  1.5 on 4/6/25, 2g ordered    Results from last 7 days   Lab Units 04/06/25  0559 04/05/25  0546 04/04/25 2014   MAGNESIUM mg/dL 1.5* 2.1 1.2*

## 2025-04-06 NOTE — ASSESSMENT & PLAN NOTE
The patient presented with severe right hip pain and the inability to ambulate after a mechanical fall at home while playing with his dog.  I appreciate Orthopedic Surgery's input.  Per Orthopedic Surgery, the patient has a non-operative fracture and can be weightbearing as tolerated.  Incentive spirometry  DVT prophylaxis with Lovenox 40 mg SQ every 24 hours and SCD's on the bilateral lower extremities  Consult PT and OT  Rehab  On toradol, dilaudid, prn and lyrica scheduled.   Dilaudid dose increased to 0.5. Oxy increased to q4    CT scan of the pelvis (04/04/2025):  IMPRESSION:     Nondisplaced right greater trochanter fracture.

## 2025-04-06 NOTE — PLAN OF CARE
Problem: PAIN - ADULT  Goal: Verbalizes/displays adequate comfort level or baseline comfort level  Description: Interventions:- Encourage patient to monitor pain and request assistance- Assess pain using appropriate pain scale- Administer analgesics based on type and severity of pain and evaluate response- Implement non-pharmacological measures as appropriate and evaluate response- Consider cultural and social influences on pain and pain management- Notify physician/advanced practitioner if interventions unsuccessful or patient reports new pain  Outcome: Progressing     Problem: INFECTION - ADULT  Goal: Absence or prevention of progression during hospitalization  Description: INTERVENTIONS:- Assess and monitor for signs and symptoms of infection- Monitor lab/diagnostic results- Monitor all insertion sites, i.e. indwelling lines, tubes, and drains- Monitor endotracheal if appropriate and nasal secretions for changes in amount and color- [place] appropriate cooling/warming therapies per order- Administer medications as ordered- Instruct and encourage patient and family to use good hand hygiene technique- Identify and instruct in appropriate isolation precautions for identified infection/condition  Outcome: Progressing  Goal: Absence of fever/infection during neutropenic period  Description: INTERVENTIONS:- Monitor WBC  Outcome: Progressing     Problem: SAFETY ADULT  Goal: Patient will remain free of falls  Description: INTERVENTIONS:- Educate patient/family on patient safety including physical limitations- Instruct patient to call for assistance with activity - Consult OT/PT to assist with strengthening/mobility - Keep Call bell within reach- Keep bed low and locked with side rails adjusted as appropriate- Keep care items and personal belongings within reach- Initiate and maintain comfort rounds- Make Fall Risk Sign visible to staff- Offer Toileting every 3 Hours, in advance of need- Initiate/Maintain bed alarm-  Obtain necessary fall risk management equipment: - Apply yellow socks and bracelet for high fall risk patients- Consider moving patient to room near nurses station  Outcome: Progressing  Goal: Maintain or return to baseline ADL function  Description: INTERVENTIONS:-  Assess patient's ability to carry out ADLs; assess patient's baseline for ADL function and identify physical deficits which impact ability to perform ADLs (bathing, care of mouth/teeth, toileting, grooming, dressing, etc.)- Assess/evaluate cause of self-care deficits - Assess range of motion- Assess patient's mobility; develop plan if impaired- Assess patient's need for assistive devices and provide as appropriate- Encourage maximum independence but intervene and supervise when necessary- Involve family in performance of ADLs- Assess for home care needs following discharge - Consider OT consult to assist with ADL evaluation and planning for discharge- Provide patient education as appropriate  INTERVENTIONS:-  Assess patient's ability to carry out ADLs; assess patient's baseline for ADL function and identify physical deficits which impact ability to perform ADLs (bathing, care of mouth/teeth, toileting, grooming, dressing, etc.)- Assess/evaluate cause of self-care deficits - Assess range of motion- Assess patient's mobility; develop plan if impaired- Assess patient's need for assistive devices and provide as appropriate- Encourage maximum independence but intervene and supervise when necessary- Involve family in performance of ADLs- Assess for home care needs following discharge - Consider OT consult to assist with ADL evaluation and planning for discharge- Provide patient education as appropriate  Outcome: Progressing  Goal: Maintains/Returns to pre admission functional level  Description: INTERVENTIONS:- Perform AM-PAC 6 Click Basic Mobility/ Daily Activity assessment daily.- Set and communicate daily mobility goal to care team and  patient/family/caregiver. - Collaborate with rehabilitation services on mobility goals if consulted- Perform Range of Motion 3 times a day.- Reposition patient every 3 hours.- Dangle patient 3 times a day- Stand patient 3 times a day- Ambulate patient 3 times a day- Out of bed to chair 3 times a day - Out of bed for meals 3 times a day- Out of bed for toileting- Record patient progress and toleration of activity level   INTERVENTIONS:- Perform AM-PAC 6 Click Basic Mobility/ Daily Activity assessment daily.- Set and communicate daily mobility goal to care team and patient/family/caregiver. - Collaborate with rehabilitation services on mobility goals if consulted- Perform Range of Motion 3-4 times a day.- Reposition patient every 2 hours.- Dangle patient 3 times a day- Stand patient 3 times a day- Ambulate patient 3 times a day- Out of bed to chair 3 times a day - Out of bed for meals 3 times a day- Out of bed for toileting- Record patient progress and toleration of activity level   Outcome: Progressing     Problem: DISCHARGE PLANNING  Goal: Discharge to home or other facility with appropriate resources  Description: INTERVENTIONS:- Identify barriers to discharge w/patient and caregiver- Arrange for needed discharge resources and transportation as appropriate- Identify discharge learning needs (meds, wound care, etc.)- Arrange for interpretive services to assist at discharge as needed- Refer to Case Management Department for coordinating discharge planning if the patient needs post-hospital services based on physician/advanced practitioner order or complex needs related to functional status, cognitive ability, or social support system  Outcome: Progressing     Problem: Knowledge Deficit  Goal: Patient/family/caregiver demonstrates understanding of disease process, treatment plan, medications, and discharge instructions  Description: Complete learning assessment and assess knowledge base.Interventions:- Provide  teaching at level of understanding- Provide teaching via preferred learning methods  Outcome: Progressing     Problem: MUSCULOSKELETAL - ADULT  Goal: Maintain or return mobility to safest level of function  Description: INTERVENTIONS:- Assess patient's ability to carry out ADLs; assess patient's baseline for ADL function and identify physical deficits which impact ability to perform ADLs (bathing, care of mouth/teeth, toileting, grooming, dressing, etc.)- Assess/evaluate cause of self-care deficits - Assess range of motion- Assess patient's mobility- Assess patient's need for assistive devices and provide as appropriate- Encourage maximum independence but intervene and supervise when necessary- Involve family in performance of ADLs- Assess for home care needs following discharge - Consider OT consult to assist with ADL evaluation and planning for discharge- Provide patient education as appropriate  Outcome: Progressing  Goal: Maintain proper alignment of affected body part  Description: INTERVENTIONS:- Support, maintain and protect limb and body alignment- Provide patient/ family with appropriate education  Outcome: Progressing     Problem: MOBILITY - ADULT  Goal: Maintain or return to baseline ADL function  Description: INTERVENTIONS:-  Assess patient's ability to carry out ADLs; assess patient's baseline for ADL function and identify physical deficits which impact ability to perform ADLs (bathing, care of mouth/teeth, toileting, grooming, dressing, etc.)- Assess/evaluate cause of self-care deficits - Assess range of motion- Assess patient's mobility; develop plan if impaired- Assess patient's need for assistive devices and provide as appropriate- Encourage maximum independence but intervene and supervise when necessary- Involve family in performance of ADLs- Assess for home care needs following discharge - Consider OT consult to assist with ADL evaluation and planning for discharge- Provide patient education as  appropriate  INTERVENTIONS:-  Assess patient's ability to carry out ADLs; assess patient's baseline for ADL function and identify physical deficits which impact ability to perform ADLs (bathing, care of mouth/teeth, toileting, grooming, dressing, etc.)- Assess/evaluate cause of self-care deficits - Assess range of motion- Assess patient's mobility; develop plan if impaired- Assess patient's need for assistive devices and provide as appropriate- Encourage maximum independence but intervene and supervise when necessary- Involve family in performance of ADLs- Assess for home care needs following discharge - Consider OT consult to assist with ADL evaluation and planning for discharge- Provide patient education as appropriate  Outcome: Progressing  Goal: Maintains/Returns to pre admission functional level  Description: INTERVENTIONS:- Perform AM-PAC 6 Click Basic Mobility/ Daily Activity assessment daily.- Set and communicate daily mobility goal to care team and patient/family/caregiver. - Collaborate with rehabilitation services on mobility goals if consulted- Perform Range of Motion 3 times a day.- Reposition patient every 3 hours.- Dangle patient 3 times a day- Stand patient 3 times a day- Ambulate patient 3 times a day- Out of bed to chair 3 times a day - Out of bed for meals 3 times a day- Out of bed for toileting- Record patient progress and toleration of activity level   INTERVENTIONS:- Perform AM-PAC 6 Click Basic Mobility/ Daily Activity assessment daily.- Set and communicate daily mobility goal to care team and patient/family/caregiver. - Collaborate with rehabilitation services on mobility goals if consulted- Perform Range of Motion 3-4 times a day.- Reposition patient every 2 hours.- Dangle patient 3 times a day- Stand patient 3 times a day- Ambulate patient 3 times a day- Out of bed to chair 3 times a day - Out of bed for meals 3 times a day- Out of bed for toileting- Record patient progress and toleration  of activity level   Outcome: Progressing

## 2025-04-06 NOTE — RESPIRATORY THERAPY NOTE
Patient using the flutter valve independently. No c/o any sob at this time. Will be going to rehab. Will dc respiratory protocol at this time and will gladly re-assess if needed

## 2025-04-06 NOTE — UTILIZATION REVIEW
Initial Clinical Review    Admission: Date/Time/Statement:   Admission Orders (From admission, onward)       Ordered        04/04/25 2016  INPATIENT ADMISSION  Once                          Orders Placed This Encounter   Procedures    INPATIENT ADMISSION     Standing Status:   Standing     Number of Occurrences:   1     Level of Care:   Med Surg [16]     Estimated length of stay:   More than 2 Midnights     Certification:   I certify that inpatient services are medically necessary for this patient for a duration of greater than two midnights. See H&P and MD Progress Notes for additional information about the patient's course of treatment.     ED Arrival Information       Expected   -    Arrival   4/4/2025 15:56    Acuity   Urgent              Means of arrival   Wheelchair    Escorted by   Family Member    Service   Hospitalist    Admission type   Emergency              Arrival complaint   fall, shoulder injury             Chief Complaint   Patient presents with    Hip Pain     Patient reports playing with his dog when he fell. Complains of R hip pain down his leg.        Initial Presentation: 59 y.o. male who presented self from home to [facility] ED.  Pertinent PMHx: AUD, Traore esophagus, COPD, HTN, psychiatric disorder, seizures. Presented w/ R hip pain after falling in a twisting motion while playing with his dog. Unable to stand or bear weight on RLE since. EXAM: decreased ROM to R hip w/ tenderness. Na 129. Mag 1.2. Plt 136. CT showed R greater trochanter fx. In ED received, IV mag 2 g x2, toradol 30 mg IM, IV dialudid 1 mg x1, and PO KCl 40 mEq. Admitted as Inpatient for evaluation and treatment of R femur fracture, electrolyte imbalances.   PLAN: NWB RLE, pain medications, Trend labs, replete electrolytes as needed; check urine sodium and osmolality; sodium tabs, continue current meds, check keppra level, incentive spirometry, I&O. Orthopedic Surgery consulted.     Anticipated Length of  Stay/Certification Statement: Patient will be admitted on an inpatient basis with an anticipated length of stay of greater than 2 midnights secondary to closed fracture of right greater trochanter, hypomagnesemia, hyponatremia.      Date: 4/5   Day 2: Pt experiencing severe R hip pain and R inguinal pain. Inability to ambulate at this time. Na 127. Plan: changed from SQ heparin to Lovenox, incentive spirometry, Trend labs, replete electrolytes as needed. Continue sodium chloride tabs BID. Continue other current meds.    Orthopedic Surgery: Pt w/ nondisplaced fracture of R femur. WBAT RLE. Limit R hip abduction. No acute surgical intervention. Pain control. DVT ppx. Encourage OOB, PT/OT evals.       Date: 04/06/25  Day 3: Has surpassed a 2nd midnight with active treatments and services. Pain rated as 8/10. Only able to ambulate a few steps. Exam: coarse expiratory wheezing, RLE limited ROM. Na 127. Mag 1.5. Plan: WBAT RLE. Lovenox, PT/OT evals, toradol, dilaudid PRN, lyrica. IV mag 2g x1. Continue sodium tabs. Trend labs, replete electrolytes as needed. Increase IV dilaudid from 0.2 mg to 0.5 mg q4h PRN.       ED Treatment-Medication Administration from 04/04/2025 1556 to 04/04/2025 2204         Date/Time Order Dose Route Action     04/04/2025 1648 ketorolac (TORADOL) injection 30 mg 30 mg Intramuscular Given     04/04/2025 2033 HYDROmorphone (DILAUDID) injection 1 mg 1 mg Intravenous Given     04/04/2025 2143 potassium chloride oral solution 40 mEq 40 mEq Oral Given     04/04/2025 2145 magnesium sulfate 2 g/50 mL IVPB (premix) 2 g 2 g Intravenous New Bag     04/04/2025 2145 magnesium sulfate 2 g/50 mL IVPB (premix) 2 g 2 g Intravenous New Bag            Scheduled Medications:  calcium carbonate, 500 mg, Oral, Daily  Cholecalciferol, 1,000 Units, Oral, Daily  cyanocobalamin, 100 mcg, Oral, Daily  doxepin, 50 mg, Oral, HS  enoxaparin, 40 mg, Subcutaneous, Q24H  folic acid, 1,000 mcg, Oral, Daily  levETIRAcetam, 750  mg, Oral, Q12H  lisinopril, 2.5 mg, Oral, Daily  nicotine, 1 patch, Transdermal, Daily  omeprazole, 40 mg, Oral, BID AC  pregabalin, 100 mg, Oral, TID  sodium chloride, 3 g, Oral, BID  thiamine, 100 mg, Oral, Daily  umeclidinium-vilanterol, 1 puff, Inhalation, Daily    Continuous IV Infusions: none    PRN Meds:  albuterol, 2 puff, Inhalation, Q6H PRN  HYDROmorphone, 0.2 mg, Intravenous, Q4H PRN; 4/5 x2  HYDROmorphone, 0.5 mg, Intravenous, Q4H PRN; 4/6 x1  ondansetron, 4 mg, Intravenous, Q6H PRN  oxyCODONE, 10 mg, Oral, Q4H PRN; 4/4 x1, 4/5 x2, 4/6 x1  oxyCODONE, 5 mg, Oral, Q4H PRN    heparin (porcine) subcutaneous injection 5,000 Units  Dose: 5,000 Units Freq: Every 8 hours scheduled Route: SC  Start: 04/04/25 2245 End: 04/05/25 1347  magnesium sulfate 2 g/50 mL IVPB (premix) 2 g  Dose: 2 g  Freq: Once Route: IV  Last Dose: 2 g (04/06/25 1157)  Start: 04/06/25 1100 End: 04/06/25 1357  potassium-sodium phosphateS (K-PHOS,PHOSPHA 250) -250 mg tablet 2 tablet  Dose: 2 tablet  Freq: Once Route: PO  Start: 04/06/25 1200 End: 04/06/25 1157    ED Triage Vitals [04/04/25 1613]   Temperature Pulse Respirations Blood Pressure SpO2 Pain Score   99 °F (37.2 °C) 92 20 143/87 95 % 10 - Worst Possible Pain     Weight (last 2 days)       Date/Time Weight    04/04/25 2215 53 (116.84)            Vital Signs (last 3 days)       Date/Time Temp Pulse Resp BP MAP (mmHg) SpO2 O2 Device [place] Coma Scale Score Pain    04/06/25 1512 98 °F (36.7 °C) 120 16 131/77 98 90 % None (Room air) -- --    04/06/25 0959 -- -- -- -- -- -- -- -- 8    04/06/25 0850 97.1 °F (36.2 °C) 90 18 134/82 98 91 % None (Room air) -- --    04/06/25 0557 -- -- -- -- -- -- -- -- 10 - Worst Possible Pain    04/05/25 2100 -- -- -- -- -- -- -- 15 No Pain    04/05/25 1930 -- -- -- -- -- -- -- -- 9    04/05/25 1901 98.4 °F (36.9 °C) 81 18 92/62 71 97 % -- -- --    04/05/25 1636 -- -- -- -- -- -- -- -- 9 04/05/25 1520 97.9 °F (36.6 °C) 85 20 159/84 109 97 % None  (Room air) -- --    04/05/25 1348 -- -- -- -- -- 96 % None (Room air) -- --    04/05/25 1335 -- -- -- -- -- -- -- -- 9 04/05/25 1333 -- -- -- -- -- -- -- -- 9    04/05/25 1100 -- -- -- -- -- -- -- -- 7    04/05/25 0817 -- -- -- -- -- -- None (Room air) 15 --    04/05/25 0700 98.1 °F (36.7 °C) 121 18 111/80 93 89 % -- -- --    04/05/25 0015 -- -- -- -- -- -- -- -- 9 04/04/25 2303 -- -- -- -- -- -- -- -- 9 04/04/25 2245 -- 92 16 -- -- 96 % None (Room air) -- --    04/04/25 2224 -- -- -- -- -- -- -- -- 9 04/04/25 2215 98.2 °F (36.8 °C) 90 21 142/91 114 92 % None (Room air) 15 --    04/04/25 2130 -- 95 18 157/92 119 95 % None (Room air) -- --    04/04/25 2033 -- -- -- -- -- -- -- -- 10 - Worst Possible Pain    04/04/25 2015 -- 91 16 169/93 124 94 % None (Room air) -- --    04/04/25 1800 -- 93 20 162/91 119 96 % None (Room air) -- --    04/04/25 1648 -- -- -- -- -- -- -- -- 10 - Worst Possible Pain    04/04/25 1630 -- -- -- -- -- -- None (Room air) 15 --    04/04/25 1613 99 °F (37.2 °C) 92 20 143/87 110 95 % None (Room air) -- 10 - Worst Possible Pain              Pertinent Labs/Diagnostic Test Results:   Radiology:  CT pelvis wo contrast   ED Interpretation by Chauncey Cho DO (04/04 1903)   No obvious acute osseous abnormality.      Final Interpretation by Cristofer Padilla MD (04/04 1910)      Nondisplaced right greater trochanter fracture.      Workstation performed: [id number]         XR hip/pelv 2-3 vws right if performed   ED Interpretation by Chauncey Cho DO (04/04 1721)   No obvious acute osseous abnormality.        Final Interpretation by Cristofer Padilla MD (04/05 0740)      No acute osseous abnormality.         Computerized Assisted Algorithm (CAA) may have been used to analyze all applicable images.            Workstation performed: [id number]         XR knee 4+ vw right injury   ED Interpretation by Chauncey Cho DO (04/04 1721)   No obvious acute osseous abnormality.       Final Interpretation by Cristofer Padilla MD (04/05 0740)      No acute osseous abnormality.         Computerized Assisted Algorithm (CAA) may have been used to analyze all applicable images.         Workstation performed: [id number]                  Results from last 7 days   Lab Units 04/06/25 0559 04/05/25 0546 04/04/25 2014   WBC Thousand/uL 5.27 4.73 5.84   HEMOGLOBIN g/dL 11.9* 12.9 13.5   HEMATOCRIT % 35.7* 38.7 39.2   PLATELETS Thousands/uL 111* 119* 136*   TOTAL NEUT ABS Thousands/µL 3.06 3.20 4.29         Results from last 7 days   Lab Units 04/06/25 0559 04/05/25 0546 04/04/25 2014   SODIUM mmol/L 127* 127* 129*   POTASSIUM mmol/L 4.0 3.9 3.7   CHLORIDE mmol/L 93* 91* 90*   CO2 mmol/L 29 27 29   ANION GAP mmol/L 5 9 10   BUN mg/dL 11 7 5   CREATININE mg/dL 0.79 0.63 0.59*   EGFR ml/min/1.73sq m 98 108 111   CALCIUM mg/dL 8.5 8.9 9.5   MAGNESIUM mg/dL 1.5* 2.1 1.2*   PHOSPHORUS mg/dL 2.5* 3.0 3.2     Results from last 7 days   Lab Units 04/06/25 0559 04/05/25 0546 04/04/25 2014   AST U/L 27 36 48*   ALT U/L 14 18 22   ALK PHOS U/L 90 95 105*   TOTAL PROTEIN g/dL 6.4 7.1 7.9   ALBUMIN g/dL 3.4* 3.8 4.3   TOTAL BILIRUBIN mg/dL 0.72 1.02* 1.10*         Results from last 7 days   Lab Units 04/06/25 0559 04/05/25 0546 04/04/25 2014   GLUCOSE RANDOM mg/dL 98 91 94      Results from last 7 days   Lab Units 04/06/25 0559   CK TOTAL U/L 102      Results from last 7 days   Lab Units 04/06/25 0559   PROCALCITONIN ng/ml 0.06       Results from last 7 days   Lab Units 04/05/25 0003   OSMO UR mmol/     Results from last 7 days   Lab Units 04/05/25 0003   SODIUM UR mmol/L 111.0         Past Medical History:   Diagnosis Date    Alcohol abuse     Traore esophagus     Bowel obstruction (HCC)     Bowel perforation (HCC)     Cardiac disease     Continuous chronic alcoholism (HCC) 10/05/2017    Continuous chronic alcoholism (HCC) 10/05/2017    COPD (chronic obstructive pulmonary disease) (HCC)     History  of shoulder surgery     Right shoulder    History of transfusion     Hx of cervical spine surgery     Hypertension     Incisional hernia 10/08/2017    MI, old     Mitral regurgitation     Psychiatric disorder     Seizures (HCC)      Present on Admission:   Closed nondisplaced fracture of greater trochanter of right femur (HCC)   COPD (chronic obstructive pulmonary disease) ([facility])   (Resolved) Gastroesophageal reflux disease with esophagitis without hemorrhage   Hypomagnesemia   Hyponatremia   Seizure disorder (HCC)   Thrombocytopenia (HCC)      Admitting Diagnosis: Hypomagnesemia [E83.42]  Muscle strain [T14.8XXA]  Hyponatremia [E87.1]  Hip pain [M25.559]  Right hip pain [M25.551]  Closed nondisplaced fracture of greater trochanter of right femur, initial encounter ([facility]) [S72.114A]  It band syndrome, right [M76.31]  Age/Sex: 59 y.o. male    Network Utilization Review Department  ATTENTION: Please call with any questions or concerns to [phone] and carefully listen to the prompts so that you are directed to the right person. All voicemails are confidential.   For Discharge needs, contact Care Management DC Support Team at [phone] opt. [phone]  Send all requests for admission clinical reviews, approved or denied determinations and any other requests to dedicated fax number below belonging to the campus where the patient is receiving treatment. List of dedicated fax numbers for the Facilities:  FACILITY NAME UR FAX NUMBER   ADMISSION DENIALS (Administrative/Medical Necessity) [phone]   DISCHARGE SUPPORT TEAM (NETWORK) [phone]   PARENT CHILD HEALTH (Maternity/NICU/Pediatrics) [phone]   [facility] [phone]   [facility] [phone]   [facility] [phone]   [facility] [phone]   [facility] [phone]   [facility] [phone]   [facility]  [facility] [phone]   [facility] [phone]   [facility] [phone]   [facility] [phone]   [facility] [phone]   [facility] [phone]

## 2025-04-06 NOTE — ASSESSMENT & PLAN NOTE
Chronic hyponatremia with a baseline serum sodium level of 125-130 mmol/L  Continue sodium chloride tablets, 3 grams PO BID  Follow the sodium level  Outpatient follow-up with Nephrology    Results from last 7 days   Lab Units 04/06/25  0559 04/05/25  0546 04/04/25 2014   SODIUM mmol/L 127* 127* 129*   POTASSIUM mmol/L 4.0 3.9 3.7   CHLORIDE mmol/L 93* 91* 90*   CO2 mmol/L 29 27 29   BUN mg/dL 11 7 5   CREATININE mg/dL 0.79 0.63 0.59*   CALCIUM mg/dL 8.5 8.9 9.5

## 2025-04-06 NOTE — PROGRESS NOTES
Progress Note - Hospitalist   Name: Cristofer Wiley 59 y.o. male I MRN: [id number]  Unit/Bed#:  I Date of Admission: 4/4/2025   Date of Service: 4/6/2025 I Hospital Day: 2     Assessment & Plan  Closed nondisplaced fracture of greater trochanter of right femur (HCC)  The patient presented with severe right hip pain and the inability to ambulate after a mechanical fall at home while playing with his dog.  I appreciate Orthopedic Surgery's input.  Per Orthopedic Surgery, the patient has a non-operative fracture and can be weightbearing as tolerated.  Incentive spirometry  DVT prophylaxis with Lovenox 40 mg SQ every 24 hours and SCD's on the bilateral lower extremities  Consult PT and OT  Rehab  On toradol, dilaudid, prn and lyrica scheduled.   Dilaudid dose increased to 0.5. Oxy increased to q4    CT scan of the pelvis (04/04/2025):  IMPRESSION:     Nondisplaced right greater trochanter fracture.  Hypomagnesemia  Resolved with magnesium sulfate 4 grams IV on 04/04/2025  Follow the magnesium level  1.5 on 4/6/25, 2g ordered    Results from last 7 days   Lab Units 04/06/25  0559 04/05/25  0546 04/04/25 2014   MAGNESIUM mg/dL 1.5* 2.1 1.2*       Hyponatremia  Chronic hyponatremia with a baseline serum sodium level of 125-130 mmol/L  Continue sodium chloride tablets, 3 grams PO BID  Follow the sodium level  Outpatient follow-up with Nephrology    Results from last 7 days   Lab Units 04/06/25  0559 04/05/25  0546 04/04/25 2014   SODIUM mmol/L 127* 127* 129*   POTASSIUM mmol/L 4.0 3.9 3.7   CHLORIDE mmol/L 93* 91* 90*   CO2 mmol/L 29 27 29   BUN mg/dL 11 7 5   CREATININE mg/dL 0.79 0.63 0.59*   CALCIUM mg/dL 8.5 8.9 9.5       COPD (chronic obstructive pulmonary disease) ([facility])  Not in acute exacerbation  Continue his home inhaler regimen  Respiratory protocol  Outpatient follow-up with Pulmonology  Seizure disorder ([facility])  Continue PO Keppra  Have routine f/u per patient.  Outpatient follow-up with  Neurology  Thrombocytopenia (HCC)  Monitor for any signs of bleeding  Follow the platelet count    VTE Pharmacologic Prophylaxis: VTE Score: 10 High Risk (Score >/= 5) - Pharmacological DVT Prophylaxis Ordered: enoxaparin (Lovenox). Sequential Compression Devices Ordered.    Mobility:   Basic Mobility Inpatient Raw Score: 14  -[facility] Goal: 4: Move to chair/commode  JH-HLM Achieved: 3: Sit at edge of bed  JH-HLM Goal NOT achieved. Continue with multidisciplinary rounding and encourage appropriate mobility to improve upon -HLM goals.    Patient Centered Rounds: I performed bedside rounds with nursing staff today.   Discussions with Specialists or Other Care Team Provider: None    Education and Discussions with Family / Patient: Patient declined call to .     Current Length of Stay: 2 day(s)  Current Patient Status: Inpatient   Certification Statement: The patient will continue to require additional inpatient hospital stay due to uncontrolled pain  Discharge Plan: Anticipate discharge tomorrow to rehab facility.    Code Status: Level 1 - Full Code    Subjective   Pt seen in the morning, pain 8/10, only able to ambulate a few steps.    Objective :  Temp:  [97.1 °F (36.2 °C)-98.4 °F (36.9 °C)] 97.1 °F (36.2 °C)  HR:  [81-90] 90  BP: ()/(62-84) 134/82  Resp:  [18-20] 18  SpO2:  [91 %-97 %] 91 %  O2 Device: None (Room air)    Body mass index is 19.44 kg/m².     Input and Output Summary (last 24 hours):     Intake/Output Summary (Last 24 hours) at 4/6/2025 1029  Last data filed at 4/6/2025 0850  Gross per 24 hour   Intake 1080 ml   Output 1200 ml   Net -120 ml       Physical Exam  Vitals and nursing note reviewed.   Constitutional:       General: He is not in acute distress.     Appearance: He is well-developed.   HENT:      Head: Normocephalic and atraumatic.   Eyes:      Conjunctiva/sclera: Conjunctivae normal.   Cardiovascular:      Rate and Rhythm: Normal rate and regular rhythm.   Pulmonary:       Effort: Pulmonary effort is normal. No respiratory distress.      Breath sounds: Normal breath sounds.   Abdominal:      General: Abdomen is flat. There is no distension.   Musculoskeletal:         General: No swelling or tenderness. Normal range of motion.      Cervical back: Neck supple.      Right lower leg: No edema.      Left lower leg: No edema.   Skin:     General: Skin is warm and dry.      Capillary Refill: Capillary refill takes less than 2 seconds.   Neurological:      General: No focal deficit present.      Mental Status: He is alert.   Psychiatric:         Mood and Affect: Mood normal.           Lines/Drains:              Lab Results: I have reviewed the following results:   Results from last 7 days   Lab Units 04/06/25  0559   WBC Thousand/uL 5.27   HEMOGLOBIN g/dL 11.9*   HEMATOCRIT % 35.7*   PLATELETS Thousands/uL 111*   SEGS PCT % 58   LYMPHO PCT % 25   MONO PCT % 14*   EOS PCT % 3     Results from last 7 days   Lab Units 04/06/25  0559   SODIUM mmol/L 127*   POTASSIUM mmol/L 4.0   CHLORIDE mmol/L 93*   CO2 mmol/L 29   BUN mg/dL 11   CREATININE mg/dL 0.79   ANION GAP mmol/L 5   CALCIUM mg/dL 8.5   ALBUMIN g/dL 3.4*   TOTAL BILIRUBIN mg/dL 0.72   ALK PHOS U/L 90   ALT U/L 14   AST U/L 27   GLUCOSE RANDOM mg/dL 98                 Results from last 7 days   Lab Units 04/06/25  0559   PROCALCITONIN ng/ml 0.06       Recent Cultures (last 7 days):         Imaging Results Review: I reviewed radiology reports from this admission including: CT abdomen/pelvis and xray(s).  Other Study Results Review: No additional pertinent studies reviewed.    Last 24 Hours Medication List:     Current Facility-Administered Medications:     albuterol (PROVENTIL HFA,VENTOLIN HFA) inhaler 2 puff, Q6H PRN    calcium carbonate (OYSTER SHELL,OSCAL) 500 mg tablet 500 mg, Daily    Cholecalciferol (VITAMIN D3) tablet 1,000 Units, Daily    cyanocobalamin (VITAMIN B-12) tablet 100 mcg, Daily    doxepin (SINEquan) capsule 50 mg, HS     enoxaparin (LOVENOX) subcutaneous injection 40 mg, Q24H    folic acid (FOLVITE) tablet 1,000 mcg, Daily    HYDROmorphone (DILAUDID) injection 0.5 mg, Q4H PRN    levETIRAcetam (KEPPRA) tablet 750 mg, Q12H    lisinopril (ZESTRIL) tablet 2.5 mg, Daily    nicotine (NICODERM CQ) 14 mg/24hr TD 24 hr patch 1 patch, Daily    omeprazole (PriLOSEC) delayed release capsule 40 mg, BID AC    ondansetron (ZOFRAN) injection 4 mg, Q6H PRN    oxyCODONE (ROXICODONE) immediate release tablet 10 mg, Q4H PRN    oxyCODONE (ROXICODONE) IR tablet 5 mg, Q4H PRN    pregabalin (LYRICA) capsule 100 mg, TID    sodium chloride tablet 3 g, BID    thiamine tablet 100 mg, Daily    umeclidinium-vilanterol 62.5-25 mcg/actuation inhaler 1 puff, Daily    Administrative Statements   Today, Patient Was Seen By: Buddy Palacios, DO  I have spent a total time of 40 minutes in caring for this patient on the day of the visit/encounter including Diagnostic results, Reviewing/placing orders in the medical record (including tests, medications, and/or procedures), and Obtaining or reviewing history  .    **Please Note: This note may have been constructed using a voice recognition system.**

## 2025-04-07 ENCOUNTER — APPOINTMENT (INPATIENT)
Dept: NON INVASIVE DIAGNOSTICS | Facility: HOSPITAL | Age: 60
End: 2025-04-07
Payer: COMMERCIAL

## 2025-04-07 DIAGNOSIS — E87.1 HYPONATREMIA: ICD-10-CM

## 2025-04-07 LAB
ALBUMIN SERPL BCG-MCNC: 3.5 G/DL (ref 3.5–5)
ALP SERPL-CCNC: 103 U/L (ref 34–104)
ALT SERPL W P-5'-P-CCNC: 17 U/L (ref 7–52)
ANION GAP SERPL CALCULATED.3IONS-SCNC: 8 MMOL/L (ref 4–13)
AST SERPL W P-5'-P-CCNC: 39 U/L (ref 13–39)
BASOPHILS # BLD MANUAL: 0.05 THOUSAND/UL (ref 0–0.1)
BASOPHILS NFR MAR MANUAL: 1 % (ref 0–1)
BILIRUB SERPL-MCNC: 0.57 MG/DL (ref 0.2–1)
BUN SERPL-MCNC: 9 MG/DL (ref 5–25)
CALCIUM SERPL-MCNC: 8.6 MG/DL (ref 8.4–10.2)
CHLORIDE SERPL-SCNC: 96 MMOL/L (ref 96–108)
CK SERPL-CCNC: 62 U/L (ref 39–308)
CO2 SERPL-SCNC: 27 MMOL/L (ref 21–32)
CREAT SERPL-MCNC: 0.64 MG/DL (ref 0.6–1.3)
EOSINOPHIL # BLD MANUAL: 0.21 THOUSAND/UL (ref 0–0.4)
EOSINOPHIL NFR BLD MANUAL: 4 % (ref 0–6)
ERYTHROCYTE [DISTWIDTH] IN BLOOD BY AUTOMATED COUNT: 15 % (ref 11.6–15.1)
GFR SERPL CREATININE-BSD FRML MDRD: 107 ML/MIN/1.73SQ M
GLUCOSE SERPL-MCNC: 90 MG/DL (ref 65–140)
HAV IGM SER QL: NORMAL
HBV CORE IGM SER QL: NORMAL
HBV SURFACE AG SER QL: NORMAL
HCT VFR BLD AUTO: 36.4 % (ref 36.5–49.3)
HCV AB SER QL: NORMAL
HGB BLD-MCNC: 12 G/DL (ref 12–17)
HIV 1+2 AB+HIV1 P24 AG SERPL QL IA: NORMAL
LYMPHOCYTES # BLD AUTO: 1.34 THOUSAND/UL (ref 0.6–4.47)
LYMPHOCYTES # BLD AUTO: 26 % (ref 14–44)
MAGNESIUM SERPL-MCNC: 1.6 MG/DL (ref 1.9–2.7)
MCH RBC QN AUTO: 31.3 PG (ref 26.8–34.3)
MCHC RBC AUTO-ENTMCNC: 33 G/DL (ref 31.4–37.4)
MCV RBC AUTO: 95 FL (ref 82–98)
MONOCYTES # BLD AUTO: 0.52 THOUSAND/UL (ref 0–1.22)
MONOCYTES NFR BLD: 10 % (ref 4–12)
NEUTROPHILS # BLD MANUAL: 3.04 THOUSAND/UL (ref 1.85–7.62)
NEUTS SEG NFR BLD AUTO: 59 % (ref 43–75)
PHOSPHATE SERPL-MCNC: 3.7 MG/DL (ref 2.7–4.5)
PLATELET # BLD AUTO: 110 THOUSANDS/UL (ref 149–390)
PLATELET BLD QL SMEAR: ABNORMAL
PMV BLD AUTO: 10.3 FL (ref 8.9–12.7)
POTASSIUM SERPL-SCNC: 4 MMOL/L (ref 3.5–5.3)
PROT SERPL-MCNC: 6.6 G/DL (ref 6.4–8.4)
RBC # BLD AUTO: 3.83 MILLION/UL (ref 3.88–5.62)
RBC MORPH BLD: NORMAL
SODIUM SERPL-SCNC: 131 MMOL/L (ref 135–147)
WBC # BLD AUTO: 5.16 THOUSAND/UL (ref 4.31–10.16)

## 2025-04-07 PROCEDURE — 80053 COMPREHEN METABOLIC PANEL: CPT | Performed by: INTERNAL MEDICINE

## 2025-04-07 PROCEDURE — 97163 PT EVAL HIGH COMPLEX 45 MIN: CPT

## 2025-04-07 PROCEDURE — 85007 BL SMEAR W/DIFF WBC COUNT: CPT | Performed by: INTERNAL MEDICINE

## 2025-04-07 PROCEDURE — 83735 ASSAY OF MAGNESIUM: CPT

## 2025-04-07 PROCEDURE — 84100 ASSAY OF PHOSPHORUS: CPT

## 2025-04-07 PROCEDURE — 99232 SBSQ HOSP IP/OBS MODERATE 35: CPT | Performed by: HOSPITALIST

## 2025-04-07 PROCEDURE — 93970 EXTREMITY STUDY: CPT

## 2025-04-07 PROCEDURE — 85027 COMPLETE CBC AUTOMATED: CPT | Performed by: INTERNAL MEDICINE

## 2025-04-07 PROCEDURE — 82550 ASSAY OF CK (CPK): CPT | Performed by: INTERNAL MEDICINE

## 2025-04-07 PROCEDURE — 93970 EXTREMITY STUDY: CPT | Performed by: SURGERY

## 2025-04-07 RX ORDER — MAGNESIUM SULFATE HEPTAHYDRATE 40 MG/ML
2 INJECTION, SOLUTION INTRAVENOUS ONCE
Status: COMPLETED | OUTPATIENT
Start: 2025-04-07 | End: 2025-04-07

## 2025-04-07 RX ORDER — SODIUM CHLORIDE 1 G/1
3 TABLET ORAL 2 TIMES DAILY
Qty: 540 TABLET | Refills: 0 | Status: SHIPPED | OUTPATIENT
Start: 2025-04-07

## 2025-04-07 RX ADMIN — OMEPRAZOLE 40 MG: 20 CAPSULE, DELAYED RELEASE ORAL at 16:27

## 2025-04-07 RX ADMIN — UMECLIDINIUM BROMIDE AND VILANTEROL TRIFENATATE 1 PUFF: 62.5; 25 POWDER RESPIRATORY (INHALATION) at 08:17

## 2025-04-07 RX ADMIN — DOXEPIN HYDROCHLORIDE 50 MG: 25 CAPSULE ORAL at 20:45

## 2025-04-07 RX ADMIN — CALCIUM 500 MG: 500 TABLET ORAL at 08:14

## 2025-04-07 RX ADMIN — OXYCODONE HYDROCHLORIDE 10 MG: 10 TABLET ORAL at 03:12

## 2025-04-07 RX ADMIN — PREGABALIN 100 MG: 50 CAPSULE ORAL at 20:45

## 2025-04-07 RX ADMIN — MAGNESIUM SULFATE HEPTAHYDRATE 2 G: 40 INJECTION, SOLUTION INTRAVENOUS at 11:15

## 2025-04-07 RX ADMIN — OMEPRAZOLE 40 MG: 20 CAPSULE, DELAYED RELEASE ORAL at 08:33

## 2025-04-07 RX ADMIN — OXYCODONE HYDROCHLORIDE 10 MG: 10 TABLET ORAL at 20:45

## 2025-04-07 RX ADMIN — PREGABALIN 100 MG: 50 CAPSULE ORAL at 16:27

## 2025-04-07 RX ADMIN — THIAMINE HCL TAB 100 MG 100 MG: 100 TAB at 08:14

## 2025-04-07 RX ADMIN — LEVETIRACETAM 750 MG: 250 TABLET, FILM COATED ORAL at 11:15

## 2025-04-07 RX ADMIN — ENOXAPARIN SODIUM 40 MG: 40 INJECTION SUBCUTANEOUS at 20:46

## 2025-04-07 RX ADMIN — PREGABALIN 100 MG: 50 CAPSULE ORAL at 08:14

## 2025-04-07 RX ADMIN — OXYCODONE HYDROCHLORIDE 10 MG: 10 TABLET ORAL at 08:17

## 2025-04-07 RX ADMIN — LISINOPRIL 2.5 MG: 5 TABLET ORAL at 08:14

## 2025-04-07 RX ADMIN — VITAM B12 100 MCG: 100 TAB at 08:15

## 2025-04-07 RX ADMIN — HYDROMORPHONE HYDROCHLORIDE 0.5 MG: 1 INJECTION, SOLUTION INTRAMUSCULAR; INTRAVENOUS; SUBCUTANEOUS at 09:57

## 2025-04-07 RX ADMIN — SODIUM CHLORIDE 3 G: 1 TABLET ORAL at 08:15

## 2025-04-07 RX ADMIN — LEVETIRACETAM 750 MG: 250 TABLET, FILM COATED ORAL at 22:31

## 2025-04-07 RX ADMIN — Medication 1000 UNITS: at 08:14

## 2025-04-07 RX ADMIN — FOLIC ACID 1000 MCG: 1 TABLET ORAL at 08:14

## 2025-04-07 RX ADMIN — SODIUM CHLORIDE 3 G: 1 TABLET ORAL at 20:45

## 2025-04-07 RX ADMIN — HYDROMORPHONE HYDROCHLORIDE 0.5 MG: 1 INJECTION, SOLUTION INTRAMUSCULAR; INTRAVENOUS; SUBCUTANEOUS at 22:31

## 2025-04-07 NOTE — CASE MANAGEMENT
Case Management Discharge Planning Note    Patient name Cristofer Wiley  Location / MRN [id number]  : 1965 Date 2025       Current Admission Date: 2025  Current Admission Diagnosis:Closed nondisplaced fracture of greater trochanter of right femur (HCC)   Patient Active Problem List    Diagnosis Date Noted Date Diagnosed    Closed nondisplaced fracture of greater trochanter of right femur (HCC) 2025     Fecal smearing 2025     Chronic obstructive pulmonary disease with acute exacerbation (HCC) 2025     Chronic pain syndrome 2024     Lumbar spondylosis 2024     Primary osteoarthritis of knee 10/13/2023     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 (formerly [facility]) 2023     Closed compression fracture of third lumbar vertebra (HCC) 2023     Hypotension 2023     History of alcohol abuse 2023     Hypoglycemia 2023     Insomnia 2023     Moderate protein-calorie malnutrition (HCC) 2023     Esophageal abnormality 2023     Abnormal CXR 2023     Osteoporosis with current pathological fracture 2022     Coagulopathy (formerly [facility]) 2022    Sacral fracture, closed (formerly [facility]) 2022    Closed fracture of transverse process of lumbar vertebra (formerly [facility]) 2022    Chronic anemia 2022     Clavicular fracture 2022     Low serum cortisol level 2022     Hypocalcemia 2022     Elevated d-dimer 2022     COVID-19 virus infection 2022     Thoracic compression fracture (HCC) 2022     Aortic ectasia (formerly [facility]) 2022     Rib fractures 2022     Seizure disorder (formerly [facility]) 2022     Hypoxia 2022     Traore's esophagus 2022     Ambulatory dysfunction 2021     Current every day smoker 2021     Fall 2021     Hx of duodenal ulcer 2021     Neck pain 2021     Acute on chronic pancreatitis (HCC) 2020     Pancreatic  pseudocyst 11/22/2020     COPD (chronic obstructive pulmonary disease) (HCC) 11/22/2020     Ileus (HCC) 11/22/2020     Abnormal EKG 08/29/2020     Lesion of spleen 08/25/2020     Left anterior fascicular block 08/25/2020     Constipation 08/23/2020     Transaminitis 08/23/2020     Celiac artery stenosis (HCC) 08/22/2020     Pancreatic mass 08/22/2020     Pancytopenia (HCC) 08/22/2020     Tobacco use 08/22/2020     Traore esophagus      Duodenal ulcer 12/12/2019     Tubular adenoma 12/12/2019     Iron deficiency anemia due to chronic blood loss 10/22/2019     Closed fracture of left olecranon process, initial encounter 06/24/2019     Thrombocytopenia (HCC) 06/18/2019     Alcoholic cirrhosis of liver without ascites (HCC) 08/21/2018     Seizure-like activity (HCC) 05/22/2018     Diarrhea 05/22/2018     Abnormality of pancreatic duct 11/02/2017     Hx of seizure disorder 10/12/2017     Incisional hernia 10/08/2017     T6 vertebral fracture (HCC) 09/21/2017     Neuropathy involving both lower extremities 08/15/2017     Hyponatremia 07/29/2017     Hypokalemia 07/29/2017     Malnutrition of moderate degree (HCC) 05/18/2017     Generalized weakness 05/17/2017     Bladder wall thickening 03/09/2017     Hypophosphatemia 03/09/2017     Urinary retention 03/08/2017     Alcoholic hepatitis without ascites 03/07/2017     Vitamin D deficiency 03/07/2017     Iron deficiency 03/07/2017     Depression 02/08/2017     Elevated alkaline phosphatase level 01/14/2017     Hepatomegaly 01/12/2017     Hypomagnesemia 01/12/2017     Chronic hyponatremia 01/11/2017     Dietary folate deficiency anemia 01/11/2017     Ventral hernia without obstruction or gangrene 01/11/2017     Abdominal wound dehiscence 12/15/2016     Microcytic anemia 06/20/2016     Allergic rhinitis 06/07/2016     Tobacco abuse 05/22/2016     H/O suicide attempt 05/22/2016     H/O cervical spine surgery 05/22/2016     Left foot drop 10/31/2014     Peripheral neuropathy  10/31/2014     Cervical disc disorder with myelopathy 09/25/2014     Lumbar radiculopathy 09/25/2014     Cervical spinal stenosis 07/30/2014       LOS (days): 3  Geometric Mean LOS (GMLOS) (days):   Days to GMLOS:     OBJECTIVE:  Risk of Unplanned Readmission Score: 16.18         Current admission status: Inpatient   Preferred Pharmacy:   [facility] [address] [place] [address], [address]  [address] [place] [address], [address] [facility] [address]  Phone: [phone] Fax: [phone]    Primary Care Provider: Buddy Palacios DO    Primary Insurance: [employer]  Secondary Insurance:     DISCHARGE DETAILS:        Patient provided options for  [facility] in [place] or [facility].    Patient preference is [facility] secured.       Auth started wit discharge support in [facility].

## 2025-04-07 NOTE — PLAN OF CARE
Problem: PHYSICAL THERAPY ADULT  Goal: Performs mobility at highest level of function for planned discharge setting.  See evaluation for individualized goals.  Description: Treatment/Interventions: Functional transfer training, LE strengthening/ROM, Elevations, Therapeutic exercise, Endurance training, Gait training, Bed mobility          See flowsheet documentation for full assessment, interventions and recommendations.  Note: Prognosis: Good  Problem List: Decreased strength, Decreased endurance, Impaired balance, Decreased mobility, Pain, Orthopedic restrictions, Decreased range of motion      Assessment: Pt is a 59 y.o. male seen for PT evaluation s/p admission to [facility] on 4/4/2025 with Closed nondisplaced fracture of greater trochanter of right femur (HCC).  Order placed for PT services.  Upon evaluation: Pt is presenting with impaired functional mobility due to pain, decreased strength, decreased ROM, decreased endurance, impaired balance, gait deviations, orthopedic restrictions, and fall risk requiring min assistance for bed mobility, transfers, and ambulation with RW. Pt's clinical presentation is currently unstable/unpredictable given the functional mobility deficits above, especially weakness, decreased ROM, decreased endurance, impaired balance, gait deviations, pain, and decreased functional mobility tolerance, and combined with medical complications of pain impacting overall mobility status, abnormal CBC, abnormal sodium values, and need for input for mobility technique/safety.  Pt's PMHx and comorbidities that may affect physical performance and progress include: seizure disorder, current R greater trochanter fx, h/o cervical spine surgery causing postural deficits, peripheral neuropathy, chronic pain syndrome. Personal factors affecting pt at time of IE include: step(s) to enter environment, multi-level environment, inability to perform IADLs, inability to perform ADLs,  inability to navigate community distances, and recent fall(s)/fall history. Pt will benefit from continued skilled PT services to address deficits as defined above and to maximize level of functional mobility to facilitate return toward PLOF and improved QOL. From PT/mobility standpoint, recommendation at time of d/c would be Level I (Maximum Resource Intensity) in order to reduce fall risk and maximize pt's functional independence and consistency with mobility.     Rehab Resource Intensity Level, PT: I (Maximum Resource Intensity)    See flowsheet documentation for full assessment.

## 2025-04-07 NOTE — ASSESSMENT & PLAN NOTE
The patient presented with severe right hip pain and the inability to ambulate after a mechanical fall at home while playing with his dog.  I appreciate Orthopedic Surgery's input.  Per Orthopedic Surgery, the patient has a non-operative fracture and can be weightbearing as tolerated.  Incentive spirometry  DVT prophylaxis with Lovenox 40 mg SQ every 24 hours and SCD's on the bilateral lower extremities  Consult PT and OT  Level 1-Maximum Rehabilitation Resource Intensity level   Rehab placement pending  On oxy, dilaudid, prn and lyrica scheduled.   Dilaudid dose increased to 0.5. Oxy increased to q4 with 10 mg coverage.  Lovenox 30 days to go rehab with.  Duplex pending    CT scan of the pelvis (04/04/2025):  IMPRESSION:     Nondisplaced right greater trochanter fracture.

## 2025-04-07 NOTE — PLAN OF CARE
Problem: PAIN - ADULT  Goal: Verbalizes/displays adequate comfort level or baseline comfort level  Description: Interventions:- Encourage patient to monitor pain and request assistance- Assess pain using appropriate pain scale- Administer analgesics based on type and severity of pain and evaluate response- Implement non-pharmacological measures as appropriate and evaluate response- Consider cultural and social influences on pain and pain management- Notify physician/advanced practitioner if interventions unsuccessful or patient reports new pain  Outcome: Progressing     Problem: INFECTION - ADULT  Goal: Absence or prevention of progression during hospitalization  Description: INTERVENTIONS:- Assess and monitor for signs and symptoms of infection- Monitor lab/diagnostic results- Monitor all insertion sites, i.e. indwelling lines, tubes, and drains- Monitor endotracheal if appropriate and nasal secretions for changes in amount and color- [place] appropriate cooling/warming therapies per order- Administer medications as ordered- Instruct and encourage patient and family to use good hand hygiene technique- Identify and instruct in appropriate isolation precautions for identified infection/condition  Outcome: Progressing  Goal: Absence of fever/infection during neutropenic period  Description: INTERVENTIONS:- Monitor WBC  Outcome: Progressing     Problem: SAFETY ADULT  Goal: Patient will remain free of falls  Description: INTERVENTIONS:- Educate patient/family on patient safety including physical limitations- Instruct patient to call for assistance with activity - Consult OT/PT to assist with strengthening/mobility - Keep Call bell within reach- Keep bed low and locked with side rails adjusted as appropriate- Keep care items and personal belongings within reach- Initiate and maintain comfort rounds- Make Fall Risk Sign visible to staff- Offer Toileting every 3 Hours, in advance of need- Initiate/Maintain bed alarm-  Obtain necessary fall risk management equipment: - Apply yellow socks and bracelet for high fall risk patients- Consider moving patient to room near nurses station  Outcome: Progressing  Goal: Maintain or return to baseline ADL function  Description: INTERVENTIONS:-  Assess patient's ability to carry out ADLs; assess patient's baseline for ADL function and identify physical deficits which impact ability to perform ADLs (bathing, care of mouth/teeth, toileting, grooming, dressing, etc.)- Assess/evaluate cause of self-care deficits - Assess range of motion- Assess patient's mobility; develop plan if impaired- Assess patient's need for assistive devices and provide as appropriate- Encourage maximum independence but intervene and supervise when necessary- Involve family in performance of ADLs- Assess for home care needs following discharge - Consider OT consult to assist with ADL evaluation and planning for discharge- Provide patient education as appropriate  INTERVENTIONS:-  Assess patient's ability to carry out ADLs; assess patient's baseline for ADL function and identify physical deficits which impact ability to perform ADLs (bathing, care of mouth/teeth, toileting, grooming, dressing, etc.)- Assess/evaluate cause of self-care deficits - Assess range of motion- Assess patient's mobility; develop plan if impaired- Assess patient's need for assistive devices and provide as appropriate- Encourage maximum independence but intervene and supervise when necessary- Involve family in performance of ADLs- Assess for home care needs following discharge - Consider OT consult to assist with ADL evaluation and planning for discharge- Provide patient education as appropriate  Outcome: Progressing  Goal: Maintains/Returns to pre admission functional level  Description: INTERVENTIONS:- Perform AM-PAC 6 Click Basic Mobility/ Daily Activity assessment daily.- Set and communicate daily mobility goal to care team and  patient/family/caregiver. - Collaborate with rehabilitation services on mobility goals if consulted- Perform Range of Motion 3 times a day.- Reposition patient every 3 hours.- Dangle patient 3 times a day- Stand patient 3 times a day- Ambulate patient 3 times a day- Out of bed to chair 3 times a day - Out of bed for meals 3 times a day- Out of bed for toileting- Record patient progress and toleration of activity level   INTERVENTIONS:- Perform AM-PAC 6 Click Basic Mobility/ Daily Activity assessment daily.- Set and communicate daily mobility goal to care team and patient/family/caregiver. - Collaborate with rehabilitation services on mobility goals if consulted- Perform Range of Motion 2 times a day.- Reposition patient every 2 hours.- Dangle patient 2 times a day- Stand patient 2 times a day- Ambulate patient 2 times a day- Out of bed to chair 2 times a day - Out of bed for meals 2 times a day- Out of bed for toileting- Record patient progress and toleration of activity level   Outcome: Progressing     Problem: DISCHARGE PLANNING  Goal: Discharge to home or other facility with appropriate resources  Description: INTERVENTIONS:- Identify barriers to discharge w/patient and caregiver- Arrange for needed discharge resources and transportation as appropriate- Identify discharge learning needs (meds, wound care, etc.)- Arrange for interpretive services to assist at discharge as needed- Refer to Case Management Department for coordinating discharge planning if the patient needs post-hospital services based on physician/advanced practitioner order or complex needs related to functional status, cognitive ability, or social support system  Outcome: Progressing     Problem: Knowledge Deficit  Goal: Patient/family/caregiver demonstrates understanding of disease process, treatment plan, medications, and discharge instructions  Description: Complete learning assessment and assess knowledge base.Interventions:- Provide teaching  at level of understanding- Provide teaching via preferred learning methods  Outcome: Progressing     Problem: MUSCULOSKELETAL - ADULT  Goal: Maintain or return mobility to safest level of function  Description: INTERVENTIONS:- Assess patient's ability to carry out ADLs; assess patient's baseline for ADL function and identify physical deficits which impact ability to perform ADLs (bathing, care of mouth/teeth, toileting, grooming, dressing, etc.)- Assess/evaluate cause of self-care deficits - Assess range of motion- Assess patient's mobility- Assess patient's need for assistive devices and provide as appropriate- Encourage maximum independence but intervene and supervise when necessary- Involve family in performance of ADLs- Assess for home care needs following discharge - Consider OT consult to assist with ADL evaluation and planning for discharge- Provide patient education as appropriate  Outcome: Progressing  Goal: Maintain proper alignment of affected body part  Description: INTERVENTIONS:- Support, maintain and protect limb and body alignment- Provide patient/ family with appropriate education  Outcome: Progressing     Problem: MOBILITY - ADULT  Goal: Maintain or return to baseline ADL function  Description: INTERVENTIONS:-  Assess patient's ability to carry out ADLs; assess patient's baseline for ADL function and identify physical deficits which impact ability to perform ADLs (bathing, care of mouth/teeth, toileting, grooming, dressing, etc.)- Assess/evaluate cause of self-care deficits - Assess range of motion- Assess patient's mobility; develop plan if impaired- Assess patient's need for assistive devices and provide as appropriate- Encourage maximum independence but intervene and supervise when necessary- Involve family in performance of ADLs- Assess for home care needs following discharge - Consider OT consult to assist with ADL evaluation and planning for discharge- Provide patient education as  appropriate  INTERVENTIONS:-  Assess patient's ability to carry out ADLs; assess patient's baseline for ADL function and identify physical deficits which impact ability to perform ADLs (bathing, care of mouth/teeth, toileting, grooming, dressing, etc.)- Assess/evaluate cause of self-care deficits - Assess range of motion- Assess patient's mobility; develop plan if impaired- Assess patient's need for assistive devices and provide as appropriate- Encourage maximum independence but intervene and supervise when necessary- Involve family in performance of ADLs- Assess for home care needs following discharge - Consider OT consult to assist with ADL evaluation and planning for discharge- Provide patient education as appropriate  Outcome: Progressing  Goal: Maintains/Returns to pre admission functional level  Description: INTERVENTIONS:- Perform AM-PAC 6 Click Basic Mobility/ Daily Activity assessment daily.- Set and communicate daily mobility goal to care team and patient/family/caregiver. - Collaborate with rehabilitation services on mobility goals if consulted- Perform Range of Motion 3 times a day.- Reposition patient every 3 hours.- Dangle patient 3 times a day- Stand patient 3 times a day- Ambulate patient 3 times a day- Out of bed to chair 3 times a day - Out of bed for meals 3 times a day- Out of bed for toileting- Record patient progress and toleration of activity level   INTERVENTIONS:- Perform AM-PAC 6 Click Basic Mobility/ Daily Activity assessment daily.- Set and communicate daily mobility goal to care team and patient/family/caregiver. - Collaborate with rehabilitation services on mobility goals if consulted- Perform Range of Motion 2 times a day.- Reposition patient every 2 hours.- Dangle patient 2 times a day- Stand patient 2 times a day- Ambulate patient 2 times a day- Out of bed to chair 2 times a day - Out of bed for meals 2 times a day- Out of bed for toileting- Record patient progress and toleration of  activity level   Outcome: Progressing

## 2025-04-07 NOTE — ARC ADMISSION
Reviewed patient's case - patient is likely acute rehab appropriate pending medical stability, functional progress, insurance authorization and bed availability. CM has been updated. Will continue to follow.

## 2025-04-07 NOTE — DISCHARGE SUPPORT
Case Management Assessment & Discharge Planning Note    Patient name Cristofer Wiley  Location / MRN [id number]  : 1965 Date 2025       Current Admission Date: 2025  Current Admission Diagnosis:Closed nondisplaced fracture of greater trochanter of right femur (HCC)   Patient Active Problem List    Diagnosis Date Noted Date Diagnosed    Closed nondisplaced fracture of greater trochanter of right femur (HCC) 2025     Fecal smearing 2025     Chronic obstructive pulmonary disease with acute exacerbation ([facility]) 2025     Chronic pain syndrome 2024     Lumbar spondylosis 2024     Primary osteoarthritis of knee 10/13/2023     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 ([facility]) 2023     Closed compression fracture of third lumbar vertebra ([facility]) 2023     Hypotension 2023     History of alcohol abuse 2023     Hypoglycemia 2023     Insomnia 2023     Moderate protein-calorie malnutrition (HCC) 2023     Esophageal abnormality 2023     Abnormal CXR 2023     Osteoporosis with current pathological fracture 2022     Coagulopathy ([facility]) 2022    Sacral fracture, closed ([facility]) 2022    Closed fracture of transverse process of lumbar vertebra ([facility]) 2022    Chronic anemia 2022     Clavicular fracture 2022     Low serum cortisol level 2022     Hypocalcemia 2022     Elevated d-dimer 2022     COVID-19 virus infection 2022     Thoracic compression fracture (HCC) 2022     Aortic ectasia ([facility]) 2022     Rib fractures 2022     Seizure disorder ([facility]) 2022     Hypoxia 2022     Traore's esophagus 2022     Ambulatory dysfunction 2021     Current every day smoker 2021     Fall 2021     Hx of duodenal ulcer 2021     Neck pain 2021     Acute on chronic pancreatitis ([facility]) 2020      Pancreatic pseudocyst 11/22/2020     COPD (chronic obstructive pulmonary disease) (HCC) 11/22/2020     Ileus (HCC) 11/22/2020     Abnormal EKG 08/29/2020     Lesion of spleen 08/25/2020     Left anterior fascicular block 08/25/2020     Constipation 08/23/2020     Transaminitis 08/23/2020     Celiac artery stenosis (HCC) 08/22/2020     Pancreatic mass 08/22/2020     Pancytopenia (HCC) 08/22/2020     Tobacco use 08/22/2020     Traore esophagus      Duodenal ulcer 12/12/2019     Tubular adenoma 12/12/2019     Iron deficiency anemia due to chronic blood loss 10/22/2019     Closed fracture of left olecranon process, initial encounter 06/24/2019     Thrombocytopenia (HCC) 06/18/2019     Alcoholic cirrhosis of liver without ascites (HCC) 08/21/2018     Seizure-like activity (HCC) 05/22/2018     Diarrhea 05/22/2018     Abnormality of pancreatic duct 11/02/2017     Hx of seizure disorder 10/12/2017     Incisional hernia 10/08/2017     T6 vertebral fracture (HCC) 09/21/2017     Neuropathy involving both lower extremities 08/15/2017     Hyponatremia 07/29/2017     Hypokalemia 07/29/2017     Malnutrition of moderate degree (HCC) 05/18/2017     Generalized weakness 05/17/2017     Bladder wall thickening 03/09/2017     Hypophosphatemia 03/09/2017     Urinary retention 03/08/2017     Alcoholic hepatitis without ascites 03/07/2017     Vitamin D deficiency 03/07/2017     Iron deficiency 03/07/2017     Depression 02/08/2017     Elevated alkaline phosphatase level 01/14/2017     Hepatomegaly 01/12/2017     Hypomagnesemia 01/12/2017     Chronic hyponatremia 01/11/2017     Dietary folate deficiency anemia 01/11/2017     Ventral hernia without obstruction or gangrene 01/11/2017     Abdominal wound dehiscence 12/15/2016     Microcytic anemia 06/20/2016     Allergic rhinitis 06/07/2016     Tobacco abuse 05/22/2016     H/O suicide attempt 05/22/2016     H/O cervical spine surgery 05/22/2016     Left foot drop 10/31/2014     Peripheral  neuropathy 10/31/2014     Cervical disc disorder with myelopathy 09/25/2014     Lumbar radiculopathy 09/25/2014     Cervical spinal stenosis 07/30/2014       LOS (days): 3  Geometric Mean LOS (GMLOS) (days):   Days to GMLOS:   Facility Authorization Initiated  [facility] received request for auth from : Elvira PERDUE  Authorization Request Submitted for: SNF  Requested Start of Care Date: 04/07/25  Facility Name: [facility] NPI: [id number]  Facility MD: Dr. Salazar  [facility] MD NPI: [id number]  Authorization initiated by contacting insurance: [employer]  Via: [employer]  Clinicals submitted via: Portal Attachment  Pending reference #: [id number]   notified: Elvira PERDUE     Updates to authorization status will be noted in chart.    Please reach out to CM for updates on any clinical information.

## 2025-04-07 NOTE — ASSESSMENT & PLAN NOTE
Continue PO Keppra  Have routine f/u per patient.  Outpatient follow-up with Neurology  Pt states to be taking at home however undetectable on admission.

## 2025-04-07 NOTE — ASSESSMENT & PLAN NOTE
Not in acute exacerbation  Continue his home inhaler regimen  Respiratory protocol  Outpatient follow-up with Pulmonology   Please provide more specific information on  oncern.   not clear from appointment  note

## 2025-04-07 NOTE — PHYSICAL THERAPY NOTE
PHYSICAL THERAPY EVALUATION  NAME:  Cristofer Wiley  DATE: 04/07/25    AGE:   59 y.o.  Mrn:   [id number]  ADMIT DX:  Hypomagnesemia [E83.42]  Muscle strain [T14.8XXA]  Hyponatremia [E87.1]  Hip pain [M25.559]  Right hip pain [M25.551]  Closed nondisplaced fracture of greater trochanter of right femur, initial encounter ([facility]) [S72.114A]  It band syndrome, right [M76.31]    Past Medical History:   Diagnosis Date    Alcohol abuse     Traore esophagus     Bowel obstruction ([facility])     Bowel perforation ([facility])     Cardiac disease     Continuous chronic alcoholism ([facility]) 10/05/2017    Continuous chronic alcoholism ([facility]) 10/05/2017    COPD (chronic obstructive pulmonary disease) ([facility])     History of shoulder surgery     Right shoulder    History of transfusion     Hx of cervical spine surgery     Hypertension     Incisional hernia 10/08/2017    MI, old     Mitral regurgitation     Psychiatric disorder     Seizures ([facility])      Length Of Stay: 3  Performed at least 2 patient identifiers during session: Name and ID bracelet  PHYSICAL THERAPY EVALUATION :    04/07/25 0912   PT Last Visit   PT Visit Date 04/07/25   Note Type   Note type Evaluation   Pain Assessment   Pain Assessment Tool 0-10   Pain Score 10 - Worst Possible Pain   Pain Location/Orientation Orientation: Right;Location: Hip   Restrictions/Precautions   Weight Bearing Precautions Per Order Yes   RLE Weight Bearing Per Order WBAT   Other Precautions Pain;Fall Risk;Multiple lines  (current R hip fx; non-operative per ortho)   Home Living   Type of Home House   Home Layout Multi-level;Bed/bath upstairs;Stairs to enter with rails  (6 TIFF)   Bathroom Shower/Tub   (claw foot tub with HHS)   Bathroom Toilet Standard   Bathroom Equipment Hand-held shower   Bathroom Accessibility Accessible   Home Equipment Walker;Wheelchair-manual;Cane;Quad cane   Additional Comments pt denies use of DME at baseline   Prior Function   Level of [place] Independent with ADLs;Independent with  functional mobility;Independent with IADLS   Lives With Family   Receives Help From Family   IADLs Independent with driving   Falls in the last 6 months 1 to 4   Vocational On disability   General   Family/Caregiver Present No   Cognition   Overall Cognitive Status WFL   Arousal/Participation Alert   Attention Within functional limits   Orientation Level Oriented X4   Following Commands Follows all commands and directions without difficulty   Subjective   Subjective pt motivated to attempt to ambulate into bathroom rather than utilizing urinal   RLE Assessment   RLE Assessment X  (3-/5 grossly; likely d/t pain)   LLE Assessment   LLE Assessment WFL   Light Touch   RLE Light Touch Impaired   RLE Light Touch Comments decreased at baseline d/t neuropathy   LLE Light Touch Impaired   LLE Light Touch Comments decreased at baseline d/t neuropathy   Bed Mobility   Supine to Sit 4  Minimal assistance   Additional items Assist x 1;Increased time required;Verbal cues;HOB elevated;LE management   Sit to Supine 4  Minimal assistance   Additional items Assist x 1;HOB elevated;Increased time required;Verbal cues;LE management   Transfers   Sit to Stand 4  Minimal assistance   Additional items Assist x 1;Impulsive;Verbal cues   Stand to Sit 4  Minimal assistance   Additional items Assist x 1;Impulsive;Verbal cues   Additional Comments performed with and without device; 1st transfer performed with support of bedside table; educated on use of RW instead for stability and increased safety   Ambulation/Elevation   Gait pattern Excessively slow;Short stride;Foward flexed;Decreased foot clearance;Narrow MCKENZIE;Antalgic;Decreased R stance   Gait Assistance 4  Minimal assist   Additional items Assist x 1;Verbal cues   Assistive Device Rolling walker   Distance 20'   Balance   Static Sitting Fair +   Dynamic Sitting Fair +   Static Standing Fair   Dynamic Standing Fair -   Ambulatory Poor +   Endurance Deficit   Endurance Deficit Yes    Endurance Deficit Description pt appears easily fatigued with ambulation and becoming SOB with increased activity; vitals remained WFL on RA throughout   Activity Tolerance   Activity Tolerance Patient limited by fatigue;Patient limited by pain   Assessment   Prognosis Good   Problem List Decreased strength;Decreased endurance;Impaired balance;Decreased mobility;Pain;Orthopedic restrictions;Decreased range of motion   Goals   Patient Goals to get better   LTG Expiration Date 04/21/25   Plan   Treatment/Interventions Functional transfer training;LE strengthening/ROM;Elevations;Therapeutic exercise;Endurance training;Gait training;Bed mobility   PT Frequency 3-5x/wk   Discharge Recommendation   Rehab Resource Intensity Level, PT I (Maximum Resource Intensity)   AM-PAC Basic Mobility Inpatient   Turning in Flat Bed Without Bedrails 3   Lying on Back to Sitting on Edge of Flat Bed Without Bedrails 3   Moving Bed to Chair 3   Standing Up From Chair Using Arms 3   Walk in Room 3   Climb 3-5 Stairs With Railing 1   Basic Mobility Inpatient Raw Score 16   Basic Mobility Standardized Score 38.32   [facility] Highest Level Of Mobility   -HL Goal 5: Stand one or more mins   -HLM Achieved 6: Walk 10 steps or more   End of Consult   Patient Position at End of Consult Supine;All needs within reach       (Please find full objective findings from PT assessment regarding body systems outlined above).     Assessment: Pt is a 59 y.o. male seen for PT evaluation s/p admission to [facility] on 4/4/2025 with Closed nondisplaced fracture of greater trochanter of right femur (HCC).  Order placed for PT services.  Upon evaluation: Pt is presenting with impaired functional mobility due to pain, decreased strength, decreased ROM, decreased endurance, impaired balance, gait deviations, orthopedic restrictions, and fall risk requiring  min assistance for bed mobility, transfers, and ambulation with RW . Pt's clinical  presentation is currently unstable/unpredictable given the functional mobility deficits above, especially weakness, decreased ROM, decreased endurance, impaired balance, gait deviations, pain, and decreased functional mobility tolerance, and combined with medical complications of pain impacting overall mobility status, abnormal CBC, abnormal sodium values, and need for input for mobility technique/safety.  Pt's PMHx and comorbidities that may affect physical performance and progress include:  seizure disorder, current R greater trochanter fx, h/o cervical spine surgery causing postural deficits, peripheral neuropathy, chronic pain syndrome . Personal factors affecting pt at time of IE include: step(s) to enter environment, multi-level environment, inability to perform IADLs, inability to perform ADLs, inability to navigate community distances, and recent fall(s)/fall history. Pt will benefit from continued skilled PT services to address deficits as defined above and to maximize level of functional mobility to facilitate return toward PLOF and improved QOL. From PT/mobility standpoint, recommendation at time of d/c would be Level I (Maximum Resource Intensity) in order to reduce fall risk and maximize pt's functional independence and consistency with mobility.      The patient's AM-PAC Basic Mobility Inpatient Short Form Raw Score is 16. A Raw score of less than or equal to 16 suggests the patient may benefit from discharge to post-acute rehabilitation services. Please also refer to the recommendation of the Physical Therapist for safe discharge planning.       Goals: Pt will participate in B LE strengthening exercises to facilitate improved functional activity tolerance. Pt will perform all functional transfers and bed mobility mod(I) with good safety awareness. Pt will ambulate 250' mod(I) with LRAD while maintaining good functional dynamic balance. Pt will ascend/descend a FFOS with HR and SUP to reflect the  ability to safely navigate the home.     Usha Mishra, PT,DPT

## 2025-04-07 NOTE — PROGRESS NOTES
Patient:    MRN:  [id number]    Aidin Request ID:  [id number]    Level of care reserved:  Skilled Nursing Facility    Partner Reserved:  [facility] [place] [address] [phone]    Clinical needs requested:  mobility, toileting, transfer, occupational therapy, physical therapy    Geography searched:  10 miles around [phone]    Start of Service:    Request sent:  12:22pm EDT on 4/5/2025 by Elvira Cooley    Partner reserved:  3:39pm EDT on 4/7/2025 by Elvira Cooley    Choice list shared:  3:39pm EDT on 4/7/2025 by Elvira Cooley

## 2025-04-07 NOTE — PROGRESS NOTES
Progress Note - Hospitalist   Name: Cristofer Wiley 59 y.o. male I MRN: [id number]  Unit/Bed#:  I Date of Admission: 4/4/2025   Date of Service: 4/7/2025 I Hospital Day: 3     Assessment & Plan  Closed nondisplaced fracture of greater trochanter of right femur (HCC)  The patient presented with severe right hip pain and the inability to ambulate after a mechanical fall at home while playing with his dog.  I appreciate Orthopedic Surgery's input.  Per Orthopedic Surgery, the patient has a non-operative fracture and can be weightbearing as tolerated.  Incentive spirometry  DVT prophylaxis with Lovenox 40 mg SQ every 24 hours and SCD's on the bilateral lower extremities  Consult PT and OT  Level 1-Maximum Rehabilitation Resource Intensity level   Rehab placement pending  On oxy, dilaudid, prn and lyrica scheduled.   Dilaudid dose increased to 0.5. Oxy increased to q4 with 10 mg coverage.  Lovenox 30 days to go rehab with.  Duplex pending    CT scan of the pelvis (04/04/2025):  IMPRESSION:     Nondisplaced right greater trochanter fracture.  Hypomagnesemia  Resolved with magnesium sulfate 4 grams IV on 04/04/2025  Follow the magnesium level  1.6 on 4/7/25, 2g ordered    Results from last 7 days   Lab Units 04/07/25  0601 04/06/25  0559 04/05/25  0546   MAGNESIUM mg/dL 1.6* 1.5* 2.1       Hyponatremia  Chronic hyponatremia with a baseline serum sodium level of 125-130 mmol/L  Continue sodium chloride tablets, 3 grams PO BID  Follow the sodium level  Outpatient follow-up with Nephrology    Results from last 7 days   Lab Units 04/07/25  0601 04/06/25  0559 04/05/25  0546   SODIUM mmol/L 131* 127* 127*   POTASSIUM mmol/L 4.0 4.0 3.9   CHLORIDE mmol/L 96 93* 91*   CO2 mmol/L 27 29 27   BUN mg/dL 9 11 7   CREATININE mg/dL 0.64 0.79 0.63   CALCIUM mg/dL 8.6 8.5 8.9       COPD (chronic obstructive pulmonary disease) (HCC)  Not in acute exacerbation  Continue his home inhaler regimen  Respiratory protocol  Outpatient follow-up  with Pulmonology  Seizure disorder (HCC)  Continue PO Keppra  Have routine f/u per patient.  Outpatient follow-up with Neurology  Pt states to be taking at home however undetectable on admission.  Thrombocytopenia (HCC)  Monitor for any signs of bleeding  Follow the platelet count    VTE Pharmacologic Prophylaxis: VTE Score: 10 High Risk (Score >/= 5) - Pharmacological DVT Prophylaxis Ordered: enoxaparin (Lovenox). Sequential Compression Devices Ordered.    Mobility:   Basic Mobility Inpatient Raw Score: 14  JH-HLM Goal: 4: Move to chair/commode  JH-HLM Achieved: 5: Stand (1 or more minutes)  JH-HLM Goal NOT achieved. Continue with multidisciplinary rounding and encourage appropriate mobility to improve upon JH-HLM goals.    Patient Centered Rounds: I performed bedside rounds with nursing staff today.   Discussions with Specialists or Other Care Team Provider: None    Education and Discussions with Family / Patient: Patient declined call to .     Current Length of Stay: 3 day(s)  Current Patient Status: Inpatient   Certification Statement: The patient will continue to require additional inpatient hospital stay due to uncontrolled pain, pending placement  Discharge Plan: Anticipate discharge tomorrow to rehab facility.    Code Status: Level 1 - Full Code    Subjective   Pt seen in the morning, pain 8/10, unable to put weight on injured leg.    Objective :  Temp:  [97.6 °F (36.4 °C)-99.8 °F (37.7 °C)] 97.6 °F (36.4 °C)  HR:  [] 101  BP: (131-166)/(77-91) 139/83  Resp:  [16-20] 20  SpO2:  [90 %-96 %] 91 %  O2 Device: None (Room air)    Body mass index is 19.44 kg/m².     Input and Output Summary (last 24 hours):     Intake/Output Summary (Last 24 hours) at 4/7/2025 1032  Last data filed at 4/7/2025 0857  Gross per 24 hour   Intake 1880 ml   Output 1625 ml   Net 255 ml       Physical Exam  Vitals and nursing note reviewed.   Constitutional:       General: He is not in acute distress.     Appearance:  He is well-developed.   HENT:      Head: Normocephalic and atraumatic.   Eyes:      Conjunctiva/sclera: Conjunctivae normal.   Cardiovascular:      Rate and Rhythm: Normal rate and regular rhythm.   Pulmonary:      Effort: Pulmonary effort is normal. No respiratory distress.      Breath sounds: Normal breath sounds.   Abdominal:      General: Abdomen is flat. There is no distension.   Musculoskeletal:         General: No swelling or tenderness. Normal range of motion.      Cervical back: Neck supple.      Right lower leg: No edema.      Left lower leg: No edema.   Skin:     General: Skin is warm and dry.      Capillary Refill: Capillary refill takes less than 2 seconds.   Neurological:      General: No focal deficit present.      Mental Status: He is alert.   Psychiatric:         Mood and Affect: Mood normal.           Lines/Drains:              Lab Results: I have reviewed the following results:   Results from last 7 days   Lab Units 04/07/25  0601 04/06/25  0559   WBC Thousand/uL 5.16 5.27   HEMOGLOBIN g/dL 12.0 11.9*   HEMATOCRIT % 36.4* 35.7*   PLATELETS Thousands/uL 110* 111*   SEGS PCT %  --  58   LYMPHO PCT % 26 25   MONO PCT % 10 14*   EOS PCT % 4 3     Results from last 7 days   Lab Units 04/07/25  0601   SODIUM mmol/L 131*   POTASSIUM mmol/L 4.0   CHLORIDE mmol/L 96   CO2 mmol/L 27   BUN mg/dL 9   CREATININE mg/dL 0.64   ANION GAP mmol/L 8   CALCIUM mg/dL 8.6   ALBUMIN g/dL 3.5   TOTAL BILIRUBIN mg/dL 0.57   ALK PHOS U/L 103   ALT U/L 17   AST U/L 39   GLUCOSE RANDOM mg/dL 90                 Results from last 7 days   Lab Units 04/06/25  0559   PROCALCITONIN ng/ml 0.06       Recent Cultures (last 7 days):         Imaging Results Review: I reviewed radiology reports from this admission including: CT abdomen/pelvis and xray(s).  Other Study Results Review: No additional pertinent studies reviewed.    Last 24 Hours Medication List:     Current Facility-Administered Medications:     albuterol (PROVENTIL  HFA,VENTOLIN HFA) inhaler 2 puff, Q6H PRN    calcium carbonate (OYSTER SHELL,OSCAL) 500 mg tablet 500 mg, Daily    Cholecalciferol (VITAMIN D3) tablet 1,000 Units, Daily    cyanocobalamin (VITAMIN B-12) tablet 100 mcg, Daily    doxepin (SINEquan) capsule 50 mg, HS    enoxaparin (LOVENOX) subcutaneous injection 40 mg, Q24H    folic acid (FOLVITE) tablet 1,000 mcg, Daily    HYDROmorphone (DILAUDID) injection 0.5 mg, Q4H PRN    levETIRAcetam (KEPPRA) tablet 750 mg, Q12H    lisinopril (ZESTRIL) tablet 2.5 mg, Daily    nicotine (NICODERM CQ) 14 mg/24hr TD 24 hr patch 1 patch, Daily    omeprazole (PriLOSEC) delayed release capsule 40 mg, BID AC    ondansetron (ZOFRAN) injection 4 mg, Q6H PRN    oxyCODONE (ROXICODONE) immediate release tablet 10 mg, Q4H PRN    oxyCODONE (ROXICODONE) IR tablet 5 mg, Q4H PRN    pregabalin (LYRICA) capsule 100 mg, TID    sodium chloride tablet 3 g, BID    thiamine tablet 100 mg, Daily    umeclidinium-vilanterol 62.5-25 mcg/actuation inhaler 1 puff, Daily    Administrative Statements   Today, Patient Was Seen By: Buddy Palacios, DO  I have spent a total time of 40 minutes in caring for this patient on the day of the visit/encounter including Diagnostic results, Reviewing/placing orders in the medical record (including tests, medications, and/or procedures), and Obtaining or reviewing history  .    **Please Note: This note may have been constructed using a voice recognition system.**

## 2025-04-07 NOTE — ASSESSMENT & PLAN NOTE
Chronic hyponatremia with a baseline serum sodium level of 125-130 mmol/L  Continue sodium chloride tablets, 3 grams PO BID  Follow the sodium level  Outpatient follow-up with Nephrology    Results from last 7 days   Lab Units 04/07/25  0601 04/06/25  0559 04/05/25  0546   SODIUM mmol/L 131* 127* 127*   POTASSIUM mmol/L 4.0 4.0 3.9   CHLORIDE mmol/L 96 93* 91*   CO2 mmol/L 27 29 27   BUN mg/dL 9 11 7   CREATININE mg/dL 0.64 0.79 0.63   CALCIUM mg/dL 8.6 8.5 8.9

## 2025-04-07 NOTE — ASSESSMENT & PLAN NOTE
Resolved with magnesium sulfate 4 grams IV on 04/04/2025  Follow the magnesium level  1.6 on 4/7/25, 2g ordered    Results from last 7 days   Lab Units 04/07/25  0601 04/06/25  0559 04/05/25  0546   MAGNESIUM mg/dL 1.6* 1.5* 2.1

## 2025-04-08 VITALS
HEIGHT: 65 IN | DIASTOLIC BLOOD PRESSURE: 67 MMHG | RESPIRATION RATE: 18 BRPM | HEART RATE: 71 BPM | OXYGEN SATURATION: 94 % | BODY MASS INDEX: 19.47 KG/M2 | SYSTOLIC BLOOD PRESSURE: 123 MMHG | WEIGHT: 116.84 LBS | TEMPERATURE: 97.8 F

## 2025-04-08 LAB
ANION GAP SERPL CALCULATED.3IONS-SCNC: 6 MMOL/L (ref 4–13)
BUN SERPL-MCNC: 11 MG/DL (ref 5–25)
CALCIUM SERPL-MCNC: 8.5 MG/DL (ref 8.4–10.2)
CHLORIDE SERPL-SCNC: 92 MMOL/L (ref 96–108)
CO2 SERPL-SCNC: 30 MMOL/L (ref 21–32)
CREAT SERPL-MCNC: 0.8 MG/DL (ref 0.6–1.3)
ERYTHROCYTE [DISTWIDTH] IN BLOOD BY AUTOMATED COUNT: 15.2 % (ref 11.6–15.1)
GFR SERPL CREATININE-BSD FRML MDRD: 98 ML/MIN/1.73SQ M
GLUCOSE SERPL-MCNC: 105 MG/DL (ref 65–140)
HCT VFR BLD AUTO: 36.6 % (ref 36.5–49.3)
HGB BLD-MCNC: 11.9 G/DL (ref 12–17)
MAGNESIUM SERPL-MCNC: 1.6 MG/DL (ref 1.9–2.7)
MCH RBC QN AUTO: 31.4 PG (ref 26.8–34.3)
MCHC RBC AUTO-ENTMCNC: 32.5 G/DL (ref 31.4–37.4)
MCV RBC AUTO: 97 FL (ref 82–98)
PHOSPHATE SERPL-MCNC: 3 MG/DL (ref 2.7–4.5)
PLATELET # BLD AUTO: 134 THOUSANDS/UL (ref 149–390)
PMV BLD AUTO: 10.9 FL (ref 8.9–12.7)
POTASSIUM SERPL-SCNC: 4 MMOL/L (ref 3.5–5.3)
RBC # BLD AUTO: 3.79 MILLION/UL (ref 3.88–5.62)
SODIUM SERPL-SCNC: 128 MMOL/L (ref 135–147)
WBC # BLD AUTO: 6.69 THOUSAND/UL (ref 4.31–10.16)

## 2025-04-08 PROCEDURE — 85027 COMPLETE CBC AUTOMATED: CPT

## 2025-04-08 PROCEDURE — 99239 HOSP IP/OBS DSCHRG MGMT >30: CPT | Performed by: HOSPITALIST

## 2025-04-08 PROCEDURE — 84100 ASSAY OF PHOSPHORUS: CPT

## 2025-04-08 PROCEDURE — 97116 GAIT TRAINING THERAPY: CPT

## 2025-04-08 PROCEDURE — 83735 ASSAY OF MAGNESIUM: CPT

## 2025-04-08 PROCEDURE — 97530 THERAPEUTIC ACTIVITIES: CPT

## 2025-04-08 PROCEDURE — 97110 THERAPEUTIC EXERCISES: CPT

## 2025-04-08 PROCEDURE — 80048 BASIC METABOLIC PNL TOTAL CA: CPT

## 2025-04-08 RX ORDER — ENOXAPARIN SODIUM 100 MG/ML
40 INJECTION SUBCUTANEOUS DAILY
Start: 2025-04-08 | End: 2025-04-17

## 2025-04-08 RX ORDER — NICOTINE 21 MG/24HR
1 PATCH, TRANSDERMAL 24 HOURS TRANSDERMAL DAILY
Qty: 28 PATCH | Refills: 0
Start: 2025-04-08

## 2025-04-08 RX ORDER — MAGNESIUM SULFATE HEPTAHYDRATE 40 MG/ML
2 INJECTION, SOLUTION INTRAVENOUS ONCE
Status: COMPLETED | OUTPATIENT
Start: 2025-04-08 | End: 2025-04-08

## 2025-04-08 RX ORDER — MAGNESIUM L-LACTATE 84 MG
84 TABLET, EXTENDED RELEASE ORAL DAILY
Start: 2025-04-08 | End: 2025-04-17

## 2025-04-08 RX ORDER — OXYCODONE HYDROCHLORIDE 5 MG/1
10 TABLET ORAL EVERY 6 HOURS PRN
Qty: 30 TABLET | Refills: 0 | Status: SHIPPED | OUTPATIENT
Start: 2025-04-08 | End: 2025-04-15

## 2025-04-08 RX ORDER — SODIUM CHLORIDE 1 G/1
3 TABLET ORAL 2 TIMES DAILY
Start: 2025-04-08 | End: 2025-04-08

## 2025-04-08 RX ORDER — METHOCARBAMOL 500 MG/1
500 TABLET, FILM COATED ORAL 3 TIMES DAILY
Qty: 30 TABLET | Refills: 0
Start: 2025-04-08 | End: 2025-04-18

## 2025-04-08 RX ADMIN — THIAMINE HCL TAB 100 MG 100 MG: 100 TAB at 08:49

## 2025-04-08 RX ADMIN — LEVETIRACETAM 750 MG: 250 TABLET, FILM COATED ORAL at 12:10

## 2025-04-08 RX ADMIN — HYDROMORPHONE HYDROCHLORIDE 0.5 MG: 1 INJECTION, SOLUTION INTRAMUSCULAR; INTRAVENOUS; SUBCUTANEOUS at 14:31

## 2025-04-08 RX ADMIN — OXYCODONE HYDROCHLORIDE 10 MG: 10 TABLET ORAL at 02:25

## 2025-04-08 RX ADMIN — OXYCODONE HYDROCHLORIDE 10 MG: 10 TABLET ORAL at 08:49

## 2025-04-08 RX ADMIN — OMEPRAZOLE 40 MG: 20 CAPSULE, DELAYED RELEASE ORAL at 06:02

## 2025-04-08 RX ADMIN — MAGNESIUM SULFATE HEPTAHYDRATE 2 G: 40 INJECTION, SOLUTION INTRAVENOUS at 12:10

## 2025-04-08 RX ADMIN — SODIUM CHLORIDE 3 G: 1 TABLET ORAL at 08:51

## 2025-04-08 RX ADMIN — PREGABALIN 100 MG: 50 CAPSULE ORAL at 08:49

## 2025-04-08 RX ADMIN — LISINOPRIL 2.5 MG: 5 TABLET ORAL at 08:49

## 2025-04-08 RX ADMIN — OXYCODONE HYDROCHLORIDE 10 MG: 10 TABLET ORAL at 13:13

## 2025-04-08 RX ADMIN — FOLIC ACID 1000 MCG: 1 TABLET ORAL at 08:49

## 2025-04-08 RX ADMIN — CALCIUM 500 MG: 500 TABLET ORAL at 08:49

## 2025-04-08 RX ADMIN — Medication 1000 UNITS: at 08:49

## 2025-04-08 RX ADMIN — UMECLIDINIUM BROMIDE AND VILANTEROL TRIFENATATE 1 PUFF: 62.5; 25 POWDER RESPIRATORY (INHALATION) at 08:49

## 2025-04-08 RX ADMIN — VITAM B12 100 MCG: 100 TAB at 08:51

## 2025-04-08 RX ADMIN — HYDROMORPHONE HYDROCHLORIDE 0.5 MG: 1 INJECTION, SOLUTION INTRAMUSCULAR; INTRAVENOUS; SUBCUTANEOUS at 06:02

## 2025-04-08 NOTE — PLAN OF CARE
Problem: PHYSICAL THERAPY ADULT  Goal: Performs mobility at highest level of function for planned discharge setting.  See evaluation for individualized goals.  Description: Treatment/Interventions: Functional transfer training, LE strengthening/ROM, Elevations, Therapeutic exercise, Endurance training, Gait training, Bed mobility          See flowsheet documentation for full assessment, interventions and recommendations.  Outcome: Progressing  Note: Prognosis: Good  Problem List: Decreased strength, Decreased endurance, Impaired balance, Decreased mobility, Pain, Orthopedic restrictions, Decreased range of motion  Assessment: Pt. seen for PT treatment session this date with interventions consisting of  therapeutic exercises, bed mobility, transfers and  gait training w/ emphasis on improving pt's functional activity tolerance. Pt. Requiring  cues for sequence and safety. In comparison to previous session, Pt. With improvement in activity tolerance.   Pt is in need of continued activity in PT to improve strength balance endurance mobility transfers and ambulation with return to maximize LOF. From PT/mobility standpoint, recommendation at time of d/c would be Level I: maximum resource intensity in order to promote return to PLOF and independence.   The patient's AM-[facility] Basic Mobility Inpatient Short Form Raw Score is 16. A Raw score of less than or equal to 16 suggests the patient may benefit from discharge to post-acute rehabilitation services. Pt continues to be functioning below baseline level. PT will continue to see pt during current hospitalization in order to address the deficits listed above and provide interventions consistent w/ POC in effort to achieve goals.  Please also refer to physical therapist recommendation for safe DC planning.        Rehab Resource Intensity Level, PT: I (Maximum Resource Intensity)    See flowsheet documentation for full assessment.

## 2025-04-08 NOTE — DISCHARGE SUPPORT
Case Management Assessment & Discharge Planning Note    Patient name Cristofer Wiley  Location / MRN [id number]  : 1965 Date 2025       Current Admission Date: 2025  Current Admission Diagnosis:Closed nondisplaced fracture of greater trochanter of right femur (HCC)   Patient Active Problem List    Diagnosis Date Noted Date Diagnosed    Closed nondisplaced fracture of greater trochanter of right femur (HCC) 2025     Fecal smearing 2025     Chronic obstructive pulmonary disease with acute exacerbation ([facility]) 2025     Chronic pain syndrome 2024     Lumbar spondylosis 2024     Primary osteoarthritis of knee 10/13/2023     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     Closed compression fracture of third lumbar vertebra ([facility]) 2023     Hypotension 2023     History of alcohol abuse 2023     Hypoglycemia 2023     Insomnia 2023     Moderate protein-calorie malnutrition (HCC) 2023     Esophageal abnormality 2023     Abnormal CXR 2023     Osteoporosis with current pathological fracture 2022     Coagulopathy ([facility]) 2022    Sacral fracture, closed ([facility]) 2022    Closed fracture of transverse process of lumbar vertebra ([facility]) 2022    Chronic anemia 2022     Clavicular fracture 2022     Low serum cortisol level 2022     Hypocalcemia 2022     Elevated d-dimer 2022     COVID-19 virus infection 2022     Thoracic compression fracture (HCC) 2022     Aortic ectasia ([facility]) 2022     Rib fractures 2022     Seizure disorder ([facility]) 2022     Hypoxia 2022     Traore's esophagus 2022     Ambulatory dysfunction 2021     Current every day smoker 2021     Fall 2021     Hx of duodenal ulcer 2021     Neck pain 2021     Acute on chronic pancreatitis ([facility]) 2020      Pancreatic pseudocyst 11/22/2020     COPD (chronic obstructive pulmonary disease) (HCC) 11/22/2020     Ileus (HCC) 11/22/2020     Abnormal EKG 08/29/2020     Lesion of spleen 08/25/2020     Left anterior fascicular block 08/25/2020     Constipation 08/23/2020     Transaminitis 08/23/2020     Celiac artery stenosis (HCC) 08/22/2020     Pancreatic mass 08/22/2020     Pancytopenia (HCC) 08/22/2020     Tobacco use 08/22/2020     Traore esophagus      Duodenal ulcer 12/12/2019     Tubular adenoma 12/12/2019     Iron deficiency anemia due to chronic blood loss 10/22/2019     Closed fracture of left olecranon process, initial encounter 06/24/2019     Thrombocytopenia (HCC) 06/18/2019     Alcoholic cirrhosis of liver without ascites (HCC) 08/21/2018     Seizure-like activity (HCC) 05/22/2018     Diarrhea 05/22/2018     Abnormality of pancreatic duct 11/02/2017     Hx of seizure disorder 10/12/2017     Incisional hernia 10/08/2017     T6 vertebral fracture (HCC) 09/21/2017     Neuropathy involving both lower extremities 08/15/2017     Hyponatremia 07/29/2017     Hypokalemia 07/29/2017     Malnutrition of moderate degree (HCC) 05/18/2017     Generalized weakness 05/17/2017     Bladder wall thickening 03/09/2017     Hypophosphatemia 03/09/2017     Urinary retention 03/08/2017     Alcoholic hepatitis without ascites 03/07/2017     Vitamin D deficiency 03/07/2017     Iron deficiency 03/07/2017     Depression 02/08/2017     Elevated alkaline phosphatase level 01/14/2017     Hepatomegaly 01/12/2017     Hypomagnesemia 01/12/2017     Chronic hyponatremia 01/11/2017     Dietary folate deficiency anemia 01/11/2017     Ventral hernia without obstruction or gangrene 01/11/2017     Abdominal wound dehiscence 12/15/2016     Microcytic anemia 06/20/2016     Allergic rhinitis 06/07/2016     Tobacco abuse 05/22/2016     H/O suicide attempt 05/22/2016     H/O cervical spine surgery 05/22/2016     Left foot drop 10/31/2014     Peripheral  neuropathy 10/31/2014     Cervical disc disorder with myelopathy 09/25/2014     Lumbar radiculopathy 09/25/2014     Cervical spinal stenosis 07/30/2014       LOS (days): 4  Geometric Mean LOS (GMLOS) (days):   Days to GMLOS:   Facility Authorization Approved  DC Support Center received approved auth for: SNF  Insurance: [employer]  Authorization Obtained Via: Portal  Name/Phone # of Rep who called in determination: Melinda  Facility Name: [facility]  Approved Authorization Number: [id number]  Start of Care Date: 04/08/25  Next Review Date: 04/10/25  Submit Next Review to: [employer] or F: [phone]   notified: [facility] group     Updates to authorization status will be noted in chart.    Please reach out to CM for updates on any clinical information.

## 2025-04-08 NOTE — ASSESSMENT & PLAN NOTE
The patient presented with severe right hip pain and the inability to ambulate after a mechanical fall at home while playing with his dog.  Per Orthopedic Surgery, the patient has a non-operative fracture and can be weightbearing as tolerated.  DVT prophylaxis with Lovenox 40 mg SQ every 24 hours for 30 days  Level 1-Maximum Rehabilitation Resource Intensity level   SNF  Will send with oxy, robaxin and continue home lyrica.     CT scan of the pelvis (04/04/2025):  IMPRESSION:     Nondisplaced right greater trochanter fracture.

## 2025-04-08 NOTE — ASSESSMENT & PLAN NOTE
Not in acute exacerbation  Continue his home inhaler regimen  Outpatient follow-up with Pulmonology

## 2025-04-08 NOTE — CASE MANAGEMENT
Case Management Discharge Planning Note    Patient name Cristofer Wiley  Location / MRN [id number]  : 1965 Date 2025       Current Admission Date: 2025  Current Admission Diagnosis:Closed nondisplaced fracture of greater trochanter of right femur (HCC)   Patient Active Problem List    Diagnosis Date Noted Date Diagnosed    Closed nondisplaced fracture of greater trochanter of right femur (HCC) 2025     Fecal smearing 2025     Chronic obstructive pulmonary disease with acute exacerbation (HCC) 2025     Chronic pain syndrome 2024     Lumbar spondylosis 2024     Primary osteoarthritis of knee 10/13/2023     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     Closed compression fracture of third lumbar vertebra (HCC) 2023     Hypotension 2023     History of alcohol abuse 2023     Hypoglycemia 2023     Insomnia 2023     Moderate protein-calorie malnutrition (HCC) 2023     Esophageal abnormality 2023     Abnormal CXR 2023     Osteoporosis with current pathological fracture 2022     Coagulopathy ([facility]) 2022    Sacral fracture, closed ([facility]) 2022    Closed fracture of transverse process of lumbar vertebra ([facility]) 2022    Chronic anemia 2022     Clavicular fracture 2022     Low serum cortisol level 2022     Hypocalcemia 2022     Elevated d-dimer 2022     COVID-19 virus infection 2022     Thoracic compression fracture (HCC) 2022     Aortic ectasia ([facility]) 2022     Rib fractures 2022     Seizure disorder ([facility]) 2022     Hypoxia 2022     Traore's esophagus 2022     Ambulatory dysfunction 2021     Current every day smoker 2021     Fall 2021     Hx of duodenal ulcer 2021     Neck pain 2021     Acute on chronic pancreatitis (HCC) 2020     Pancreatic  pseudocyst 11/22/2020     COPD (chronic obstructive pulmonary disease) (HCC) 11/22/2020     Ileus (HCC) 11/22/2020     Abnormal EKG 08/29/2020     Lesion of spleen 08/25/2020     Left anterior fascicular block 08/25/2020     Constipation 08/23/2020     Transaminitis 08/23/2020     Celiac artery stenosis (HCC) 08/22/2020     Pancreatic mass 08/22/2020     Pancytopenia (HCC) 08/22/2020     Tobacco use 08/22/2020     Traore esophagus      Duodenal ulcer 12/12/2019     Tubular adenoma 12/12/2019     Iron deficiency anemia due to chronic blood loss 10/22/2019     Closed fracture of left olecranon process, initial encounter 06/24/2019     Thrombocytopenia (HCC) 06/18/2019     Alcoholic cirrhosis of liver without ascites (HCC) 08/21/2018     Seizure-like activity (HCC) 05/22/2018     Diarrhea 05/22/2018     Abnormality of pancreatic duct 11/02/2017     Hx of seizure disorder 10/12/2017     Incisional hernia 10/08/2017     T6 vertebral fracture (HCC) 09/21/2017     Neuropathy involving both lower extremities 08/15/2017     Hyponatremia 07/29/2017     Hypokalemia 07/29/2017     Malnutrition of moderate degree (HCC) 05/18/2017     Generalized weakness 05/17/2017     Bladder wall thickening 03/09/2017     Hypophosphatemia 03/09/2017     Urinary retention 03/08/2017     Alcoholic hepatitis without ascites 03/07/2017     Vitamin D deficiency 03/07/2017     Iron deficiency 03/07/2017     Depression 02/08/2017     Elevated alkaline phosphatase level 01/14/2017     Hepatomegaly 01/12/2017     Hypomagnesemia 01/12/2017     Chronic hyponatremia 01/11/2017     Dietary folate deficiency anemia 01/11/2017     Ventral hernia without obstruction or gangrene 01/11/2017     Abdominal wound dehiscence 12/15/2016     Microcytic anemia 06/20/2016     Allergic rhinitis 06/07/2016     Tobacco abuse 05/22/2016     H/O suicide attempt 05/22/2016     H/O cervical spine surgery 05/22/2016     Left foot drop 10/31/2014     Peripheral neuropathy  10/31/2014     Cervical disc disorder with myelopathy 09/25/2014     Lumbar radiculopathy 09/25/2014     Cervical spinal stenosis 07/30/2014       LOS (days): 4  Geometric Mean LOS (GMLOS) (days):   Days to GMLOS:     OBJECTIVE:  Risk of Unplanned Readmission Score: 17.92         Current admission status: Inpatient   Preferred Pharmacy:   [facility] [place], [place] - [address]  [address]  [facility] [address]  Phone: [phone] Fax: [phone]    Primary Care Provider: Buddy Palacios DO    Primary Insurance: [employer] [place] [employer]  Secondary Insurance:     DISCHARGE DETAILS:      Facility Authorization Approved  [facility] received approved auth for: SNF  Insurance: [employer]  Authorization Obtained Via: Portal  Name/Phone # of Rep who called in determination: Melinda  Facility Name: [facility]  Approved Authorization Number: [id number]  Start of Care Date: 04/08/25  Next Review Date: 04/10/25  Submit Next Review to: [facility] Portal or F: [phone]   notified: [facility]             [facility] and rehab updated in aidin with auth.      Transport requested in round trip and secured for  1445 via  stretcher van.    Medical Necessity for transportation was completed. Copy available for transport team along with face sheet for transport.     Follow up providers listed on AVS.    Discharge info faxed to [facility] and rehab.    Nursing, provider and patient aware of  tiime.

## 2025-04-08 NOTE — DISCHARGE SUMMARY
Discharge Summary - Hospitalist   Name: Cristofer Wiley 59 y.o. male I MRN: [id number]  Unit/Bed#:  I Date of Admission: 4/4/2025   Date of Service: 4/8/2025 I Hospital Day: 4     Assessment & Plan  Closed nondisplaced fracture of greater trochanter of right femur (HCC)  The patient presented with severe right hip pain and the inability to ambulate after a mechanical fall at home while playing with his dog.  Per Orthopedic Surgery, the patient has a non-operative fracture and can be weightbearing as tolerated.  DVT prophylaxis with Lovenox 40 mg SQ every 24 hours for 30 days  Level 1-Maximum Rehabilitation Resource Intensity level   SNF  Will send with oxy, robaxin and continue home lyrica.     CT scan of the pelvis (04/04/2025):  IMPRESSION:     Nondisplaced right greater trochanter fracture.  Hypomagnesemia  May benefit from daily oral magnesium, ordered.    Results from last 7 days   Lab Units 04/08/25  0549 04/07/25  0601 04/06/25  0559   MAGNESIUM mg/dL 1.6* 1.6* 1.5*       Hyponatremia  Chronic hyponatremia with a baseline serum sodium level of 125-130 mmol/L  Continue sodium chloride tablets, 3 grams PO BID  Outpatient follow-up with Nephrology    Results from last 7 days   Lab Units 04/08/25  0549 04/07/25  0601 04/06/25  0559   SODIUM mmol/L 128* 131* 127*   POTASSIUM mmol/L 4.0 4.0 4.0   CHLORIDE mmol/L 92* 96 93*   CO2 mmol/L 30 27 29   BUN mg/dL 11 9 11   CREATININE mg/dL 0.80 0.64 0.79   CALCIUM mg/dL 8.5 8.6 8.5       COPD (chronic obstructive pulmonary disease) ([facility])  Not in acute exacerbation  Continue his home inhaler regimen  Outpatient follow-up with Pulmonology  Seizure disorder ([facility])  Continue PO Keppra  Have routine f/u per patient.  Outpatient follow-up with Neurology  Pt states to be taking at home however undetectable on admission.  Thrombocytopenia (HCC)  None symptomatic in hostpital     Medical Problems       Resolved Problems  Date Reviewed: 4/4/2025          Resolved     Gastroesophageal reflux disease with esophagitis without hemorrhage 4/5/2025     Resolved by  Td Salinas DO        Discharging Physician / Practitioner: Buddy Palacios DO  PCP: Buddy Palacios DO  Admission Date:   Admission Orders (From admission, onward)       Ordered        04/04/25 2016  INPATIENT ADMISSION  Once                          Discharge Date: 04/08/25    Consultations During Hospital Stay:  Orthopedics.     Procedures Performed:   None    Significant Findings / Test Results:   Nondisplaced right greater trochanter fracture.    Incidental Findings:   Moderate hip osteoarthritis    Degenerative changes in the visualized lower lumbar spine.   Moderate tricompartmental osteoarthritis, evidenced by articular cartilage loss, marginal osteophytes, and subchondral sclerosis.   Atherosclerotic calcifications.     Test Results Pending at Discharge (will require follow up):   None     Outpatient Tests Requested:  None    Complications:  None    Reason for Admission: Trochanteric fracture requiring pain control and rehab placement     Hospital Course:   Cristofer Wiley is a 59 y.o. male patient who originally presented to the hospital on 4/4/2025 due to trochanteric fx needing pain control. Fx is non op, pt improving slowly by the day. On day of DC pain largely controlled with oxy and dilaudid. Pt on ACEI hence no NSAID, likely liver cirrhosis hence no tylenol. Send to SNF with oxy and robaxin. Pt can continue home dose of lyrica. No complication while in hospital.     Please see above list of diagnoses and related plan for additional information.     Condition at Discharge: good    Discharge Day Visit / Exam:   Subjective:  Pain says to be 8/10, walked to bathroom yesterday. No CP, SOB, headache, dizziness, calf pain.  Vitals: Blood Pressure: 123/67 (04/08/25 0849)  Pulse: 71 (04/08/25 0642)  Temperature: 97.8 °F (36.6 °C) (04/08/25 0642)  Temp Source: Tympanic (04/08/25 0642)  Respirations: 18 (04/08/25  "0642)  Height: 5' 5\" (165.1 cm) (04/04/25 2215)  Weight - Scale: 53 kg (116 lb 13.5 oz) (04/04/25 2215)  SpO2: 94 % (04/08/25 0642)  Physical Exam  Vitals and nursing note reviewed.   Constitutional:       General: He is not in acute distress.     Appearance: He is well-developed.   HENT:      Head: Normocephalic and atraumatic.   Eyes:      Conjunctiva/sclera: Conjunctivae normal.   Cardiovascular:      Rate and Rhythm: Normal rate and regular rhythm.   Pulmonary:      Effort: Pulmonary effort is normal. No respiratory distress.      Breath sounds: Normal breath sounds.   Abdominal:      General: Abdomen is flat. There is no distension.   Musculoskeletal:         General: No swelling. Normal range of motion.      Cervical back: Neck supple.      Right lower leg: No edema.      Left lower leg: No edema.   Skin:     General: Skin is warm and dry.   Neurological:      General: No focal deficit present.      Mental Status: He is alert.   Psychiatric:         Mood and Affect: Mood normal.          Discussion with Family: Patient declined call to .     Discharge instructions/Information to patient and family:   See after visit summary for information provided to patient and family.      Provisions for Follow-Up Care:  See after visit summary for information related to follow-up care and any pertinent home health orders.      Mobility at time of Discharge:   Basic Mobility Inpatient Raw Score: 16  JH-HLM Goal: 5: Stand one or more mins  JH-HLM Achieved: 5: Stand (1 or more minutes)  HLM Goal NOT achieved. Continue to encourage mobility in post discharge setting.     Disposition:   Other Skilled Nursing Facility at [facility]    Planned Readmission: None    Discharge Medications:  See after visit summary for reconciled discharge medications provided to patient and/or family.      Administrative Statements   Discharge Statement:  I have spent a total time of 60 minutes in caring for this patient on the day of " the visit/encounter. >30 minutes of time was spent on: Diagnostic results, Prognosis, Risks and benefits of tx options, Documenting in the medical record, and Reviewing / ordering tests, medicine, procedures  .    **Please Note: This note may have been constructed using a voice recognition system**

## 2025-04-08 NOTE — ASSESSMENT & PLAN NOTE
May benefit from daily oral magnesium, ordered.    Results from last 7 days   Lab Units 04/08/25  0549 04/07/25  0601 04/06/25  0559   MAGNESIUM mg/dL 1.6* 1.6* 1.5*

## 2025-04-08 NOTE — DISCHARGE INSTR - AVS FIRST PAGE
Follow up with orthopedic surgery in 1 month.  Follow up with PCP in 2 weeks.   Robaxin and oxy for pain.  Please work with physical therapy as much as possible.  Lovenox 40mg daily for 30 days as clot prevention.   Follow up with pulmonology for COPD in 90 days

## 2025-04-08 NOTE — NURSING NOTE
Attempted to call report to [facility] and was tx to voicemail. I left voicemail with call back number.

## 2025-04-08 NOTE — PHYSICAL THERAPY NOTE
"                                                                                  PHYSICAL THERAPY NOTE          Patient Name: Cristofer Wiley  Today's Date: 4/8/2025 04/08/25 1250   PT Last Visit   PT Visit Date 04/08/25   Note Type   Note Type Treatment   Pain Assessment   Pain Assessment Tool 0-10   Pain Score 9   Pain Location/Orientation Orientation: Right;Location: Hip   Restrictions/Precautions   Weight Bearing Precautions Per Order Yes   RLE Weight Bearing Per Order WBAT   Other Precautions Fall Risk;Pain;Bed Alarm;WBS   General   Chart Reviewed Yes   Family/Caregiver Present No   Cognition   Overall Cognitive Status WFL   Arousal/Participation Alert;Cooperative   Attention Within functional limits   Orientation Level Oriented X4   Memory Within functional limits   Following Commands Follows all commands and directions without difficulty   Subjective   Subjective \"I have to go to the bathroom first\"   Bed Mobility   Supine to Sit 4  Minimal assistance   Additional items Assist x 1;HOB elevated;Bedrails;Increased time required;LE management   Sit to Supine 4  Minimal assistance   Additional items Assist x 1;Increased time required;Verbal cues;LE management   Additional Comments Increased time to transition to EOB. Sat EOB with fair+ sitting balance.   Transfers   Sit to Stand 4  Minimal assistance   Additional items Assist x 1;Increased time required;Verbal cues   Stand to Sit 4  Minimal assistance   Additional items Assist x 1;Increased time required;Verbal cues   Toilet transfer 4  Minimal assistance   Additional items Increased time required;Verbal cues;Standard toilet   Additional Comments RW used. Stood with fair balance to void.   Ambulation/Elevation   Gait pattern Excessively slow;Short stride;Foward flexed;Decreased foot clearance;Narrow MCKENZIE;Antalgic;Decreased R stance   Gait Assistance 4  Minimal assist   Additional items Assist x 1;Verbal cues   Assistive Device Rolling walker   Distance 50' x 1, " 15' x 1   Balance   Static Sitting Fair +   Dynamic Sitting Fair +   Static Standing Fair   Dynamic Standing Fair -   Ambulatory Fair -  (RW)   Endurance Deficit   Endurance Deficit Yes   Activity Tolerance   Activity Tolerance Patient limited by pain;Patient limited by fatigue   Exercises   Heelslides Supine;10 reps;AAROM;AROM;Bilateral   Hip Flexion Supine;10 reps;AROM;Left   Hip Abduction Supine;10 reps;AROM;Left   Hip Adduction Supine;10 reps;AROM;AAROM;Bilateral   Ankle Pumps Supine;20 reps;AROM;Bilateral   Assessment   Prognosis Good   Problem List Decreased strength;Decreased endurance;Impaired balance;Decreased mobility;Pain;Orthopedic restrictions;Decreased range of motion   Assessment Pt. seen for PT treatment session this date with interventions consisting of  therapeutic exercises, bed mobility, transfers and  gait training w/ emphasis on improving pt's functional activity tolerance. Pt. Requiring  cues for sequence and safety. In comparison to previous session, Pt. With improvement in activity tolerance.   Pt is in need of continued activity in PT to improve strength balance endurance mobility transfers and ambulation with return to maximize LOF. From PT/mobility standpoint, recommendation at time of d/c would be Level I: maximum resource intensity in order to promote return to PLOF and independence.   The patient's AM-PAC Basic Mobility Inpatient Short Form Raw Score is 16. A Raw score of less than or equal to 16 suggests the patient may benefit from discharge to post-acute rehabilitation services. Pt continues to be functioning below baseline level. PT will continue to see pt during current hospitalization in order to address the deficits listed above and provide interventions consistent w/ POC in effort to achieve goals.  Please also refer to physical therapist recommendation for safe DC planning.   Goals   Patient Goals to get better   LTG Expiration Date 04/21/25   PT Treatment Day 1   Plan    Treatment/Interventions Functional transfer training;LE strengthening/ROM;Elevations;Therapeutic exercise;Endurance training;Gait training;Bed mobility   Progress Progressing toward goals   PT Frequency 3-5x/wk   Discharge Recommendation   Rehab Resource Intensity Level, PT I (Maximum Resource Intensity)   AM-PAC Basic Mobility Inpatient   Turning in Flat Bed Without Bedrails 3   Lying on Back to Sitting on Edge of Flat Bed Without Bedrails 3   Moving Bed to Chair 3   Standing Up From Chair Using Arms 3   Walk in Room 3   Climb 3-5 Stairs With Railing 1   Basic Mobility Inpatient Raw Score 16   Basic Mobility Standardized Score 38.32   [facility] Highest Level Of Mobility   -HL Goal 5: Stand one or more mins   -HLM Achieved 7: Walk 25 feet or more   Education   Education Provided Mobility training   Patient Demonstrates verbal understanding;Reinforcement needed   End of Consult   Patient Position at End of Consult Supine;Bed/Chair alarm activated;All needs within reach   Licensure   [place] License Number  discuss POC with PT

## 2025-04-08 NOTE — PLAN OF CARE
Problem: PAIN - ADULT  Goal: Verbalizes/displays adequate comfort level or baseline comfort level  Description: Interventions:- Encourage patient to monitor pain and request assistance- Assess pain using appropriate pain scale- Administer analgesics based on type and severity of pain and evaluate response- Implement non-pharmacological measures as appropriate and evaluate response- Consider cultural and social influences on pain and pain management- Notify physician/advanced practitioner if interventions unsuccessful or patient reports new pain  Outcome: Progressing     Problem: INFECTION - ADULT  Goal: Absence or prevention of progression during hospitalization  Description: INTERVENTIONS:- Assess and monitor for signs and symptoms of infection- Monitor lab/diagnostic results- Monitor all insertion sites, i.e. indwelling lines, tubes, and drains- Monitor endotracheal if appropriate and nasal secretions for changes in amount and color- Sharon appropriate cooling/warming therapies per order- Administer medications as ordered- Instruct and encourage patient and family to use good hand hygiene technique- Identify and instruct in appropriate isolation precautions for identified infection/condition  Outcome: Progressing  Goal: Absence of fever/infection during neutropenic period  Description: INTERVENTIONS:- Monitor WBC  Outcome: Progressing     Problem: SAFETY ADULT  Goal: Patient will remain free of falls  Description: INTERVENTIONS:- Educate patient/family on patient safety including physical limitations- Instruct patient to call for assistance with activity - Consult OT/PT to assist with strengthening/mobility - Keep Call bell within reach- Keep bed low and locked with side rails adjusted as appropriate- Keep care items and personal belongings within reach- Initiate and maintain comfort rounds- Make Fall Risk Sign visible to staff- Offer Toileting every 3 Hours, in advance of need- Initiate/Maintain bed alarm-  Obtain necessary fall risk management equipment: - Apply yellow socks and bracelet for high fall risk patients- Consider moving patient to room near nurses station  Outcome: Progressing  Goal: Maintain or return to baseline ADL function  Description: INTERVENTIONS:-  Assess patient's ability to carry out ADLs; assess patient's baseline for ADL function and identify physical deficits which impact ability to perform ADLs (bathing, care of mouth/teeth, toileting, grooming, dressing, etc.)- Assess/evaluate cause of self-care deficits - Assess range of motion- Assess patient's mobility; develop plan if impaired- Assess patient's need for assistive devices and provide as appropriate- Encourage maximum independence but intervene and supervise when necessary- Involve family in performance of ADLs- Assess for home care needs following discharge - Consider OT consult to assist with ADL evaluation and planning for discharge- Provide patient education as appropriate  INTERVENTIONS:-  Assess patient's ability to carry out ADLs; assess patient's baseline for ADL function and identify physical deficits which impact ability to perform ADLs (bathing, care of mouth/teeth, toileting, grooming, dressing, etc.)- Assess/evaluate cause of self-care deficits - Assess range of motion- Assess patient's mobility; develop plan if impaired- Assess patient's need for assistive devices and provide as appropriate- Encourage maximum independence but intervene and supervise when necessary- Involve family in performance of ADLs- Assess for home care needs following discharge - Consider OT consult to assist with ADL evaluation and planning for discharge- Provide patient education as appropriate  Outcome: Progressing  Goal: Maintains/Returns to pre admission functional level  Description: INTERVENTIONS:- Perform AM-PAC 6 Click Basic Mobility/ Daily Activity assessment daily.- Set and communicate daily mobility goal to care team and  patient/family/caregiver. - Collaborate with rehabilitation services on mobility goals if consulted- Perform Range of Motion 3 times a day.- Reposition patient every 3 hours.- Dangle patient 3 times a day- Stand patient 3 times a day- Ambulate patient 3 times a day- Out of bed to chair 3 times a day - Out of bed for meals 3 times a day- Out of bed for toileting- Record patient progress and toleration of activity level   INTERVENTIONS:- Perform AM-PAC 6 Click Basic Mobility/ Daily Activity assessment daily.- Set and communicate daily mobility goal to care team and patient/family/caregiver. - Collaborate with rehabilitation services on mobility goals if consulted- Perform Range of Motion 3 times a day.- Reposition patient every 2 hours.- Dangle patient 3 times a day- Stand patient 3 times a day- Ambulate patient 3 times a day- Out of bed to chair 3 times a day - Out of bed for meals 3 times a day- Out of bed for toileting- Record patient progress and toleration of activity level   Outcome: Progressing     Problem: DISCHARGE PLANNING  Goal: Discharge to home or other facility with appropriate resources  Description: INTERVENTIONS:- Identify barriers to discharge w/patient and caregiver- Arrange for needed discharge resources and transportation as appropriate- Identify discharge learning needs (meds, wound care, etc.)- Arrange for interpretive services to assist at discharge as needed- Refer to Case Management Department for coordinating discharge planning if the patient needs post-hospital services based on physician/advanced practitioner order or complex needs related to functional status, cognitive ability, or social support system  Outcome: Progressing     Problem: Knowledge Deficit  Goal: Patient/family/caregiver demonstrates understanding of disease process, treatment plan, medications, and discharge instructions  Description: Complete learning assessment and assess knowledge base.Interventions:- Provide teaching  at level of understanding- Provide teaching via preferred learning methods  Outcome: Progressing     Problem: MUSCULOSKELETAL - ADULT  Goal: Maintain or return mobility to safest level of function  Description: INTERVENTIONS:- Assess patient's ability to carry out ADLs; assess patient's baseline for ADL function and identify physical deficits which impact ability to perform ADLs (bathing, care of mouth/teeth, toileting, grooming, dressing, etc.)- Assess/evaluate cause of self-care deficits - Assess range of motion- Assess patient's mobility- Assess patient's need for assistive devices and provide as appropriate- Encourage maximum independence but intervene and supervise when necessary- Involve family in performance of ADLs- Assess for home care needs following discharge - Consider OT consult to assist with ADL evaluation and planning for discharge- Provide patient education as appropriate  Outcome: Progressing  Goal: Maintain proper alignment of affected body part  Description: INTERVENTIONS:- Support, maintain and protect limb and body alignment- Provide patient/ family with appropriate education  Outcome: Progressing     Problem: MOBILITY - ADULT  Goal: Maintain or return to baseline ADL function  Description: INTERVENTIONS:-  Assess patient's ability to carry out ADLs; assess patient's baseline for ADL function and identify physical deficits which impact ability to perform ADLs (bathing, care of mouth/teeth, toileting, grooming, dressing, etc.)- Assess/evaluate cause of self-care deficits - Assess range of motion- Assess patient's mobility; develop plan if impaired- Assess patient's need for assistive devices and provide as appropriate- Encourage maximum independence but intervene and supervise when necessary- Involve family in performance of ADLs- Assess for home care needs following discharge - Consider OT consult to assist with ADL evaluation and planning for discharge- Provide patient education as  appropriate  INTERVENTIONS:-  Assess patient's ability to carry out ADLs; assess patient's baseline for ADL function and identify physical deficits which impact ability to perform ADLs (bathing, care of mouth/teeth, toileting, grooming, dressing, etc.)- Assess/evaluate cause of self-care deficits - Assess range of motion- Assess patient's mobility; develop plan if impaired- Assess patient's need for assistive devices and provide as appropriate- Encourage maximum independence but intervene and supervise when necessary- Involve family in performance of ADLs- Assess for home care needs following discharge - Consider OT consult to assist with ADL evaluation and planning for discharge- Provide patient education as appropriate  Outcome: Progressing

## 2025-04-08 NOTE — ARC ADMISSION
Noted per CM that patient/family chose SNF/subacute rehab for therapy services, and insurance authorization for same is pending at this time. Please notify with any changes.

## 2025-04-08 NOTE — ASSESSMENT & PLAN NOTE
Chronic hyponatremia with a baseline serum sodium level of 125-130 mmol/L  Continue sodium chloride tablets, 3 grams PO BID  Outpatient follow-up with Nephrology    Results from last 7 days   Lab Units 04/08/25  0549 04/07/25  0601 04/06/25  0559   SODIUM mmol/L 128* 131* 127*   POTASSIUM mmol/L 4.0 4.0 4.0   CHLORIDE mmol/L 92* 96 93*   CO2 mmol/L 30 27 29   BUN mg/dL 11 9 11   CREATININE mg/dL 0.80 0.64 0.79   CALCIUM mg/dL 8.5 8.6 8.5

## 2025-04-09 ENCOUNTER — PATIENT OUTREACH (OUTPATIENT)
Dept: CASE MANAGEMENT | Facility: OTHER | Age: 60
End: 2025-04-09

## 2025-04-09 NOTE — PROGRESS NOTES
Outpatient care management referral via HRR report 4/9/25. Discharged to [facility] 4/8/25. Email sent to facility to inform them I will be following the patient during their skilled stay.

## 2025-04-09 NOTE — UTILIZATION REVIEW
NOTIFICATION OF ADMISSION DISCHARGE   This is a Notification of Discharge from [facility]. Please be advised that this patient has been discharge from our facility. Below you will find the admission and discharge date and time including the patient’s disposition.   UTILIZATION REVIEW CONTACT:  Utilization Review Assistants  Network Utilization Review Department  Phone: [phone] x carefully listen to the prompts. All voicemails are confidential.  Email: [email or web address]     ADMISSION INFORMATION  PRESENTATION DATE: 4/4/2025  4:05 PM  OBERVATION ADMISSION DATE: N/A  INPATIENT ADMISSION DATE: 4/4/25  8:16 PM   DISCHARGE DATE: 4/8/2025  3:11 PM   DISPOSITION:Non [facility] SNF/TCU/SNU    Network Utilization Review Department  ATTENTION: Please call with any questions or concerns to [phone] and carefully listen to the prompts so that you are directed to the right person. All voicemails are confidential.   For Discharge needs, contact Care Management DC Support Team at [phone] opt. 2  Send all requests for admission clinical reviews, approved or denied determinations and any other requests to dedicated fax number below belonging to the campus where the patient is receiving treatment. List of dedicated fax numbers for the Facilities:  FACILITY NAME UR FAX NUMBER   ADMISSION DENIALS (Administrative/Medical Necessity) [phone]   DISCHARGE SUPPORT TEAM ([facility]) [phone]   PARENT CHILD HEALTH (Maternity/NICU/Pediatrics) [phone]   [facility] [phone]   [facility] [phone]   [facility] [phone]   [facility] [phone]   [facility] [phone]   [facility] [phone]   [facility] [phone]   [facility] [phone]   [facility]  [facility] [phone]   [facility] [phone]   [facility] [phone]   [facility] [phone]

## 2025-04-16 ENCOUNTER — PATIENT OUTREACH (OUTPATIENT)
Dept: CASE MANAGEMENT | Facility: OTHER | Age: 60
End: 2025-04-16

## 2025-04-16 NOTE — PROGRESS NOTES
Chart review completed.  Email sent to facility requesting update on patient.   This care manager assistant will continue to monitor via chart review throughout SNF/STR Surveillance episode.    Update received from [facility] SNF the patient has a TDD of 4/18/25.

## 2025-04-17 ENCOUNTER — HOSPITAL ENCOUNTER (OUTPATIENT)
Facility: HOSPITAL | Age: 60
Setting detail: OUTPATIENT SURGERY
Discharge: HOME | End: 2025-04-17
Attending: ANESTHESIOLOGY | Admitting: ANESTHESIOLOGY
Payer: COMMERCIAL

## 2025-04-17 ENCOUNTER — TELEPHONE (OUTPATIENT)
Dept: PAIN MEDICINE | Facility: CLINIC | Age: 60
End: 2025-04-17

## 2025-04-17 ENCOUNTER — APPOINTMENT (OUTPATIENT)
Dept: RADIOLOGY | Facility: HOSPITAL | Age: 60
End: 2025-04-17
Payer: COMMERCIAL

## 2025-04-17 VITALS
WEIGHT: 125 LBS | OXYGEN SATURATION: 98 % | TEMPERATURE: 98.1 F | HEIGHT: 64 IN | SYSTOLIC BLOOD PRESSURE: 122 MMHG | HEART RATE: 77 BPM | RESPIRATION RATE: 18 BRPM | BODY MASS INDEX: 21.34 KG/M2 | DIASTOLIC BLOOD PRESSURE: 74 MMHG

## 2025-04-17 DIAGNOSIS — G89.4 CHRONIC PAIN SYNDROME: ICD-10-CM

## 2025-04-17 DIAGNOSIS — M54.16 LUMBAR RADICULOPATHY: ICD-10-CM

## 2025-04-17 DIAGNOSIS — R26.2 AMBULATORY DYSFUNCTION: ICD-10-CM

## 2025-04-17 DIAGNOSIS — S32.030A CLOSED COMPRESSION FRACTURE OF THIRD LUMBAR VERTEBRA (HCC): ICD-10-CM

## 2025-04-17 DIAGNOSIS — S32.10XA SACRAL FRACTURE, CLOSED (HCC): ICD-10-CM

## 2025-04-17 DIAGNOSIS — M47.816 LUMBAR SPONDYLOSIS: Primary | ICD-10-CM

## 2025-04-17 DIAGNOSIS — T84.216A HARDWARE FAILURE OF ANTERIOR COLUMN OF SPINE (HCC): ICD-10-CM

## 2025-04-17 DIAGNOSIS — S32.009A CLOSED FRACTURE OF TRANSVERSE PROCESS OF LUMBAR VERTEBRA (HCC): ICD-10-CM

## 2025-04-17 PROCEDURE — 64636 DESTROY L/S FACET JNT ADDL: CPT | Performed by: ANESTHESIOLOGY

## 2025-04-17 PROCEDURE — 64635 DESTROY LUMB/SAC FACET JNT: CPT | Performed by: ANESTHESIOLOGY

## 2025-04-17 RX ORDER — LIDOCAINE HYDROCHLORIDE 10 MG/ML
INJECTION, SOLUTION EPIDURAL; INFILTRATION; INTRACAUDAL; PERINEURAL AS NEEDED
Status: DISCONTINUED | OUTPATIENT
Start: 2025-04-17 | End: 2025-04-17 | Stop reason: HOSPADM

## 2025-04-17 RX ORDER — LIDOCAINE HYDROCHLORIDE 20 MG/ML
INJECTION, SOLUTION EPIDURAL; INFILTRATION; INTRACAUDAL; PERINEURAL AS NEEDED
Status: DISCONTINUED | OUTPATIENT
Start: 2025-04-17 | End: 2025-04-17 | Stop reason: HOSPADM

## 2025-04-17 NOTE — TELEPHONE ENCOUNTER
Right L4-5 and L5-S1 RFA at [facility] with Dr Garcia 4/17/25 5/1/25 Left side RFA at [facility]

## 2025-04-17 NOTE — OP NOTE
OPERATIVE REPORT  PATIENT NAME: Cristofer Wiley    :  1965  MRN: [id number]  Pt Location: [facility] OR ROOM IR    SURGERY DATE: 2025    Surgeons and Role:     * Jonathan Garcia MD - Primary    Preop Diagnosis:  Lumbar spondylosis [M47.816]    Post-Op Diagnosis Codes:     * Lumbar spondylosis [M47.816]    Procedure(s):  Right - Right L4-L5 and L5-S1 Radio Frequency ablation    Specimen(s):  * No specimens in log *    Estimated Blood Loss:   Minimal    Drains:  * No LDAs found *    Anesthesia Type:   Local    Operative Indications:  Lumbar spondylosis [M47.816]      Operative Findings:  same      Complications:   None    Procedure and Technique:  Fluoroscopically-guided Radiofrequency denervation of the right L4-L5 and L5-S1 facet joint(s)       After discussing the risks, benefits, and alternatives to the procedure, the patient expressed understanding and wished to proceed.  The patient was brought to the fluoroscopy suite and placed in the prone position.  Procedural pause conducted to verify:  correct patient identity, procedure to be performed and as applicable, correct side and site, correct patient position, and availability of implants, special equipment and special requirements.  Using fluoroscopy, the junction of the transverse process and superior articulating process of the right L4, L5, S1 levels were identified and marked.  The skin was sterilely prepped and draped in the usual fashion using Chloraprep skin prep.  The skin and subcutaneous tissue were anesthetized with 0.5 % lidocaine.   Using fluoroscopic guidance, a 100 mm 18 gauge RFK RF cannula with a 10 mm active tip was advanced to each target.  This was confirmed using PA, lateral and oblique fluoroscopic views.  Motor testing was performed at 2Hz up to 2.5 volts, and the measured tissue impedances were satisfactory.  With final needle positioning, there was no evidence of radicular stimulation.  Prior to lesioning, 1cc of 2% lidocaine was  injected at each site.  After waiting 2 minutes for local anesthesia to take effect, lesioning was performed at 90 degrees Celsius for 90 seconds. A slight repositioning of the needles was performed an lesioning was again performed at 90 degreees Celsius for 90 seconds. After RF treatment, each site received 1cc of 0.25% bupivacaine. The patient tolerated the procedure well and there were no apparent complications.  After appropriate observation, the patient was dismissed from the clinic in good condition under their own power.    I was present for the entire procedure.    Patient Disposition:  hemodynamically stable             SIGNATURE: Jonathan Garcia MD  DATE: April 17, 2025  TIME: 9:18 AM

## 2025-04-17 NOTE — H&P
Assessment:  1. Lumbar spondylosis  FL spine and pain procedure    FL spine and pain procedure      2. 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 Sacral fracture, closed (HCC)  FL spine and pain procedure    FL spine and pain procedure      4. Closed compression fracture of third lumbar vertebra (HCC)  FL spine and pain procedure    FL spine and pain procedure      5. Closed fracture of transverse process of lumbar vertebra (HCC)  FL spine and pain procedure    FL spine and pain procedure      6. Chronic pain syndrome  FL spine and pain procedure    FL spine and pain procedure      7. Ambulatory dysfunction  FL spine and pain procedure    FL spine and pain procedure      8. Lumbar radiculopathy  FL spine and pain procedure    FL spine and pain procedure          Plan:  Cristofer Wiley is a 59 y.o. male with complaints of low back pain presents to surgical center for procedure.  We will perform a Procedure(s) (LRB):  Right L4-L5 and L5-S1 Radio Frequency ablation (Right)   2. Follow-up 1 month after injection    Complete risks and benefits including bleeding, infection, tissue reaction, nerve injury and allergic reaction were discussed. The approach was demonstrated using models and literature was provided. Verbal and written consent was obtained.    My impressions and treatment recommendations were discussed in detail with the patient who verbalized understanding and had no further questions.  Discharge instructions were provided. I personally saw and examined the patient and I agree with the above discussed plan of care.    Orders Placed This Encounter   Procedures    FL spine and pain procedure     Standing Status:   Standing     Number of Occurrences:   1     Reason for Exam::   RIGHT L4-L5, L5 SI RFA     Anticoagulant hold needed?:   NA     No orders of the defined types were placed in this encounter.      History of Present Illness:  Cristofer  STACI Wiley is a 59 y.o. male who presents for a follow up office visit in regards to low back pain.   The patient’s current symptoms include 8 out of 10 sharp, stabbing, throbbing constant pain without a particular time pattern.     I have personally reviewed and/or updated the patient's past medical history, past surgical history, family history, social history, current medications, allergies, and vital signs today.     Review of Systems   Musculoskeletal:  Positive for arthralgias and back pain.   All other systems reviewed and are negative.      Patient Active Problem List   Diagnosis    Tobacco abuse    H/O suicide attempt    H/O cervical spine surgery    Chronic hyponatremia    Dietary folate deficiency anemia    Ventral hernia without obstruction or gangrene    Hepatomegaly    Hypomagnesemia    Elevated alkaline phosphatase level    Alcoholic hepatitis without ascites    Vitamin D deficiency    Iron deficiency    Urinary retention    Bladder wall thickening    Hypophosphatemia    Generalized weakness    Malnutrition of moderate degree (HCC)    Hyponatremia    Hypokalemia    Incisional hernia    Hx of seizure disorder    Seizure-like activity (HCC)    Diarrhea    Abnormality of pancreatic duct    Allergic rhinitis    Microcytic anemia    Abdominal wound dehiscence    Cervical disc disorder with myelopathy    Cervical spinal stenosis    Depression    Left foot drop    Lumbar radiculopathy    Neuropathy involving both lower extremities    Peripheral neuropathy    T6 vertebral fracture (HCC)    Alcoholic cirrhosis of liver without ascites (HCC)    Thrombocytopenia (HCC)    Closed fracture of left olecranon process, initial encounter    Iron deficiency anemia due to chronic blood loss    Duodenal ulcer    Tubular adenoma    Traore esophagus    Celiac artery stenosis (HCC)    Pancreatic mass    Pancytopenia (HCC)    Tobacco use    Constipation    Transaminitis    Lesion of spleen    Left anterior fascicular block     Abnormal EKG    Acute on chronic pancreatitis (HCC)    Pancreatic pseudocyst    COPD (chronic obstructive pulmonary disease) (HCC)    Ileus (HCC)    Ambulatory dysfunction    Current every day smoker    Fall    Hx of duodenal ulcer    Neck pain    Thoracic compression fracture (HCC)    Aortic ectasia (HCC)    Rib fractures    Seizure disorder (HCC)    Hypoxia    Traore's esophagus    Elevated d-dimer    COVID-19 virus infection    Hypocalcemia    Clavicular fracture    Low serum cortisol level    Chronic anemia    Osteoporosis with current pathological fracture    Abnormal CXR    Moderate protein-calorie malnutrition (HCC)    Esophageal abnormality    Insomnia    History of alcohol abuse    Hypoglycemia    Hypotension    Closed compression fracture of third lumbar vertebra (HCC)    Sacral fracture, closed (HCC)    Coagulopathy (HCC)    Closed fracture of transverse process of lumbar vertebra (HCC)    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)    Primary osteoarthritis of knee    Chronic pain syndrome    Lumbar spondylosis    Fecal smearing    Chronic obstructive pulmonary disease with acute exacerbation (HCC)    Closed nondisplaced fracture of greater trochanter of right femur (HCC)       Past Medical History:   Diagnosis Date    Alcohol abuse     Traore esophagus     Bowel obstruction (HCC)     Bowel perforation (HCC)     Cardiac disease     Continuous chronic alcoholism (HCC) 10/05/2017    Continuous chronic alcoholism (HCC) 10/05/2017    COPD (chronic obstructive pulmonary disease) (HCC)     History of shoulder surgery     Right shoulder    History of transfusion     Hx of cervical spine surgery     Hypertension     Incisional hernia 10/08/2017    MI, old     Mitral regurgitation     Psychiatric disorder     Seizures (HCC)        Past Surgical History:   Procedure Laterality Date    APPENDECTOMY      BACK SURGERY      CHOLECYSTECTOMY      ESOPHAGOGASTRODUODENOSCOPY N/A 11/28/2016    Procedure:  ESOPHAGOGASTRODUODENOSCOPY (EGD);  Surgeon: Darryl Monroy MD;  Location:  GI LAB;  Service:     GALLBLADDER SURGERY      LAPAROTOMY N/A 10/25/2016    Procedure: LAPAROTOMY EXPLORATORY;  Surgeon: Renzo Harper MD;  [facility];  Service:     MOUTH SURGERY      NERVE BLOCK Bilateral 02/15/2024    Procedure: Bilateral L4-5 and L5-S1 medial branch block #1;  Surgeon: Jonathan Garcia MD;  Location: [facility];  Service: Pain Management     NERVE BLOCK Bilateral 03/07/2024    Procedure: BLOCK MEDIAL BRANCH B/L L4-5 and L5-S1 MBB#2;  Surgeon: Jonathan Garcia MD;  [facility]: [facility];  Service: Pain Management     PERCUTANEOUS PINNING FEMORAL NECK FRACTURE      RADIOFREQUENCY ABLATION Left 04/03/2024    Procedure: Left L4-L5 and L5-S1 radiofrequency ablation;  Surgeon: Jonathan Garcia MD;  [facility]: [facility];  Service: Pain Management     RADIOFREQUENCY ABLATION Right 04/26/2024    Procedure: Right L4-L5 and L5-S1 radiofrequency ablation;  Surgeon: Jonathan Garcia MD;  [facility];  Service: Pain Management     SHOULDER SURGERY Right     SHOULDER SURGERY      SMALL INTESTINE SURGERY      STOMACH SURGERY      bal surgery       Family History   Problem Relation Age of Onset    Breast cancer Mother     Prostate cancer Father     Skin cancer Brother        Social History     Occupational History    Not on file   Tobacco Use    Smoking status: Every Day     Current packs/day: 1.00     Average packs/day: 1 pack/day for 35.0 years (35.0 ttl pk-yrs)     Types: Cigarettes    Smokeless tobacco: Never   Vaping Use    Vaping status: Never Used   Substance and Sexual Activity    Alcohol use: Yes     Alcohol/week: 42.0 standard drinks of alcohol     Types: 42 Cans of beer per week     Comment: 6 pack/day    Drug use: No    Sexual activity: Not Currently     Partners: Female       No current facility-administered medications on file prior to encounter.     Current Outpatient Medications  "on File Prior to Encounter   Medication Sig    albuterol (PROVENTIL HFA,VENTOLIN HFA) 90 mcg/act inhaler Inhale 2 puffs every 6 (six) hours as needed for wheezing    calcium carbonate (OS-KORI) 600 MG tablet Take 1 tablet (600 mg total) by mouth daily    cyanocobalamin (VITAMIN B-12) 100 mcg tablet Take 1 tablet (100 mcg total) by mouth daily    levETIRAcetam (KEPPRA) 750 mg tablet Take 1 tablet (750 mg total) by mouth every 12 (twelve) hours    Multiple Vitamin (Tab-A-Rosa) TABS Take 1 tablet by mouth daily    pregabalin (LYRICA) 100 mg capsule Take 1 capsule (100 mg total) by mouth 3 (three) times a day    thiamine (VITAMIN B1) 100 mg tablet Take 1 tablet (100 mg total) by mouth daily    umeclidinium-vilanterol (Anoro Ellipta) 62.5-25 mcg/actuation inhaler Inhale 1 puff daily    carbamide peroxide (DEBROX) 6.5 % otic solution Administer 5 drops to the right ear 2 (two) times a day (Patient not taking: Reported on 4/4/2025)    tamsulosin (FLOMAX) 0.4 mg Take 1 capsule (0.4 mg total) by mouth daily with dinner    Vitamin D, Cholecalciferol, 25 MCG (1000 UT) TABS Take 1 tablet (1,000 Units total) by mouth daily       No Known Allergies    Physical Exam:    /76   Pulse 95   Temp 98.1 °F (36.7 °C) (Temporal)   Resp 20   Ht 5' 4\" (1.626 m)   Wt 56.7 kg (125 lb)   SpO2 93%   BMI 21.46 kg/m²     Constitutional:normal, well developed, well nourished, alert, in no distress and non-toxic and no overt pain behavior.  Eyes:anicteric  HEENT:grossly intact  Neck:supple, symmetric, trachea midline and no masses   Pulmonary:even and unlabored  Cardiovascular:No edema or pitting edema present  Skin:Normal without rashes or lesions and well hydrated  Psychiatric:Mood and affect appropriate  Neurologic:Cranial Nerves II-XII grossly intact  Musculoskeletal:normal        "

## 2025-04-21 ENCOUNTER — PATIENT OUTREACH (OUTPATIENT)
Dept: CASE MANAGEMENT | Facility: OTHER | Age: 60
End: 2025-04-21

## 2025-04-21 NOTE — PROGRESS NOTES
In basket message received from Terri DUNLAP with patient handoff.  Chart reviewed.  Admitted to [facility] 4/4-4/8 with a femur fracture.Fracture was not treated surgically. He discharged to a short term rehab.  He has a history of COPD, seizure disorder and alcoholic cirrhosis.Discharged from rehab facility 4/19.  On 4/17 patient had a right L4-L5 and L5 -S1 ablation for pain management.  I will outreach tomorrow.

## 2025-04-22 ENCOUNTER — PATIENT OUTREACH (OUTPATIENT)
Dept: CASE MANAGEMENT | Facility: OTHER | Age: 60
End: 2025-04-22

## 2025-04-22 NOTE — PROGRESS NOTES
"I spoke with Cristofer who reports he is home and doing okay. He reports he has his medications and is taking them as directed.He is scheduled to see his orthopedic surgeon on 4/24 and has transportation. He stated he was having difficulty hearing me because I was \"breaking up\".   I advised him I will call him after his orthopedic appointment.  "

## 2025-04-24 ENCOUNTER — OFFICE VISIT (OUTPATIENT)
Dept: OBGYN CLINIC | Facility: CLINIC | Age: 60
End: 2025-04-24
Payer: COMMERCIAL

## 2025-04-24 ENCOUNTER — APPOINTMENT (OUTPATIENT)
Dept: RADIOLOGY | Facility: MEDICAL CENTER | Age: 60
End: 2025-04-24
Attending: ORTHOPAEDIC SURGERY
Payer: COMMERCIAL

## 2025-04-24 VITALS
OXYGEN SATURATION: 98 % | HEART RATE: 78 BPM | BODY MASS INDEX: 21.34 KG/M2 | HEIGHT: 64 IN | WEIGHT: 125 LBS | TEMPERATURE: 98.2 F

## 2025-04-24 DIAGNOSIS — S72.114A CLOSED NONDISPLACED FRACTURE OF GREATER TROCHANTER OF RIGHT FEMUR, INITIAL ENCOUNTER (HCC): Primary | ICD-10-CM

## 2025-04-24 DIAGNOSIS — S72.114A CLOSED NONDISPLACED FRACTURE OF GREATER TROCHANTER OF RIGHT FEMUR, INITIAL ENCOUNTER (HCC): ICD-10-CM

## 2025-04-24 PROCEDURE — 27246 TREAT THIGH FRACTURE: CPT | Performed by: ORTHOPAEDIC SURGERY

## 2025-04-24 PROCEDURE — 99213 OFFICE O/P EST LOW 20 MIN: CPT | Performed by: ORTHOPAEDIC SURGERY

## 2025-04-24 PROCEDURE — 73502 X-RAY EXAM HIP UNI 2-3 VIEWS: CPT

## 2025-04-24 NOTE — PROGRESS NOTES
Name: Cristofer Wiley      : 1965      MRN: [id number]  Encounter Provider: Aram Aggarwal DO  Encounter Date: 2025   Encounter department: [facility] [place]  :  Assessment & Plan  Closed nondisplaced fracture of greater trochanter of right femur, initial encounter ([facility])    Orders:    XR hip/pelv 2-3 vws right if performed; Future    Ambulatory referral to Orthopedic Surgery      The patient has a nondisplaced fracture of his right greater trochanteric.  This does not require surgical invention.  There is no trochanteric extension.  Continue protected weightbearing.  No active abduction.  Return back 6 weeks for evaluation with new x-rays of right hip-2 views       Cristofer Wiley is a pleasant 59 y.o. male presents with painful symptoms associated with right greater trochanteric fracture that is nondisplaced.  Nonoperative measures treatment is elective for him at this time.  He may weight-bear as tolerated the use of the cane.  I did advise he should try to avoid repetitive abduction motions of the hip.  Analgesics may be utilized as an as needed.  I would like to follow-up with him in 6 weeks for repeat clinical evaluation repeat x-ray of the right hip.     No follow-ups on file.    I have personally reviewed pertinent imaging in PACS.  X-rays of the right hip obtained today 2025 demonstrates a subtle nondisplaced greater trochanteric fracture without interval displacement compared to prior films.    History: Cristofer Wiley is a 59 y.o. male presenting for evaluation of his right hip.  He suffered a fall 2 weeks ago open his dog resulting in a nondisplaced greater trochanter fracture.  He was evaluated at the emergency department and states that he has been able to ambulate with the assistance of a cane.  He notes pain is localized to the lateral aspect of his hip but can wrap circumferentially into the groin area and down the leg.  Image studies suggest CT scan to  "emergency department did not demonstrate any more involved fractures.  Today denies any distal paresthesias.  He is able to ambulate today.      Estimated body mass index is 21.46 kg/m² as calculated from the following:    Height as of this encounter: 5' 4\" (1.626 m).    Weight as of this encounter: 56.7 kg (125 lb).    Lab Results   Component Value Date    HGBA1C 5.0 02/26/2023       Social History     Occupational History    Not on file   Tobacco Use    Smoking status: Every Day     Current packs/day: 1.00     Average packs/day: 1 pack/day for 35.0 years (35.0 ttl pk-yrs)     Types: Cigarettes    Smokeless tobacco: Never   Vaping Use    Vaping status: Never Used   Substance and Sexual Activity    Alcohol use: Yes     Alcohol/week: 42.0 standard drinks of alcohol     Types: 42 Cans of beer per week     Comment: 6 pack/day    Drug use: No    Sexual activity: Not Currently     Partners: Female       Objective:  Right Hip Exam     Tenderness   The patient is experiencing tenderness in the greater trochanter.    Range of Motion   Abduction:  45   Adduction:  25   Extension:  0   Flexion:  110     Muscle Strength   Abduction: 5/5   Adduction: 5/5   Flexion: 5/5     Other   Erythema: absent  Scars: absent  Sensation: normal  Pulse: present            Vitals:    04/24/25 0836   Pulse: 78   Temp: 98.2 °F (36.8 °C)   SpO2: 98%       Subjective:  Past Medical History:   Diagnosis Date    Alcohol abuse     Traore esophagus     Bowel obstruction (HCC)     Bowel perforation (HCC)     Cardiac disease     Continuous chronic alcoholism (HCC) 10/05/2017    Continuous chronic alcoholism (HCC) 10/05/2017    COPD (chronic obstructive pulmonary disease) (HCC)     History of shoulder surgery     Right shoulder    History of transfusion     Hx of cervical spine surgery     Hypertension     Incisional hernia 10/08/2017    MI, old     Mitral regurgitation     Psychiatric disorder     Seizures (HCC)        Past Surgical History: "   Procedure Laterality Date    APPENDECTOMY      BACK SURGERY      CHOLECYSTECTOMY      ESOPHAGOGASTRODUODENOSCOPY N/A 11/28/2016    Procedure: ESOPHAGOGASTRODUODENOSCOPY (EGD);  Surgeon: Darryl Monroy MD;  Location:  GI LAB;  Service:     GALLBLADDER SURGERY      LAPAROTOMY N/A 10/25/2016    Procedure: LAPAROTOMY EXPLORATORY;  Surgeon: Renzo Harper MD;  [facility];  Service:     MOUTH SURGERY      NERVE BLOCK Bilateral 02/15/2024    Procedure: Bilateral L4-5 and L5-S1 medial branch block #1;  Surgeon: Jonathan Garcia MD;  [facility];  Service: Pain Management     NERVE BLOCK Bilateral 03/07/2024    Procedure: BLOCK MEDIAL BRANCH B/L L4-5 and L5-S1 MBB#2;  Surgeon: Jonathan Garcia MD;  [facility];  Service: Pain Management     PERCUTANEOUS PINNING FEMORAL NECK FRACTURE      RADIOFREQUENCY ABLATION Left 04/03/2024    Procedure: Left L4-L5 and L5-S1 radiofrequency ablation;  Surgeon: Jonathan Garcia MD;  [facility];  Service: Pain Management     RADIOFREQUENCY ABLATION Right 04/26/2024    Procedure: Right L4-L5 and L5-S1 radiofrequency ablation;  Surgeon: Jonathan Garcia MD;  [facility];  Service: Pain Management     RADIOFREQUENCY ABLATION Right 4/17/2025    Procedure: Right L4-L5 and L5-S1 Radio Frequency ablation;  Surgeon: Jonathan Garcia MD;  [facility];  Service: Pain Management     SHOULDER SURGERY Right     SHOULDER SURGERY      SMALL INTESTINE SURGERY      STOMACH SURGERY      bal surgery       Family History   Problem Relation Age of Onset    Breast cancer Mother     Prostate cancer Father     Skin cancer Brother          Current Outpatient Medications:     albuterol (PROVENTIL HFA,VENTOLIN HFA) 90 mcg/act inhaler, Inhale 2 puffs every 6 (six) hours as needed for wheezing, Disp: 9 g, Rfl: 0    calcium carbonate (OS-KORI) 600 MG tablet, Take 1 tablet (600 mg total) by mouth daily, Disp: 100 tablet, Rfl: 3     cyanocobalamin (VITAMIN B-12) 100 mcg tablet, Take 1 tablet (100 mcg total) by mouth daily, Disp: 90 tablet, Rfl: 0    doxepin (SINEquan) 50 mg capsule, Take 1 capsule by mouth once daily at bedtime, Disp: 30 capsule, Rfl: 0    folic acid (FOLVITE) 1 mg tablet, Take 1 tablet by mouth once daily, Disp: 90 tablet, Rfl: 0    levETIRAcetam (KEPPRA) 750 mg tablet, Take 1 tablet (750 mg total) by mouth every 12 (twelve) hours, Disp: 180 tablet, Rfl: 2    lisinopril (ZESTRIL) 2.5 mg tablet, Take 1 tablet (2.5 mg total) by mouth daily, Disp: 90 tablet, Rfl: 0    omeprazole (PriLOSEC) 40 MG capsule, Take 1 capsule by mouth twice daily, Disp: 180 capsule, Rfl: 0    pregabalin (LYRICA) 100 mg capsule, Take 1 capsule (100 mg total) by mouth 3 (three) times a day, Disp: 90 capsule, Rfl: 2    sodium chloride 1 g tablet, TAKE 3 TABLETS BY MOUTH TWICE DAILY, Disp: 540 tablet, Rfl: 0    thiamine (VITAMIN B1) 100 mg tablet, Take 1 tablet (100 mg total) by mouth daily, Disp: 90 tablet, Rfl: 1    umeclidinium-vilanterol (Anoro Ellipta) 62.5-25 mcg/actuation inhaler, Inhale 1 puff daily, Disp: 180 each, Rfl: 1    methocarbamol (ROBAXIN) 500 mg tablet, Take 1 tablet (500 mg total) by mouth 3 (three) times a day for 10 days, Disp: 30 tablet, Rfl: 0    Multiple Vitamin (Tab-A-Rosa) TABS, Take 1 tablet by mouth daily, Disp: 30 tablet, Rfl: 6    nicotine (NICODERM CQ) 14 mg/24hr TD 24 hr patch, Place 1 patch on the skin over 24 hours daily Apply a new patch every 24 hours to a clean, dry, hairless site on the upper arm or hip. (Patient not taking: Reported on 4/24/2025), Disp: 28 patch, Rfl: 0    No Known Allergies    Review of Systems   Constitutional:  Positive for activity change. Negative for chills, fever and unexpected weight change.   HENT:  Negative for hearing loss, nosebleeds and sore throat.    Eyes:  Negative for pain, redness and visual disturbance.   Respiratory:  Negative for cough, shortness of breath and wheezing.   "  Cardiovascular:  Negative for chest pain, palpitations and leg swelling.   Gastrointestinal:  Negative for abdominal pain, nausea and vomiting.   Endocrine: Negative for polydipsia and polyuria.   Genitourinary:  Negative for dysuria and hematuria.   Musculoskeletal:  See HPI  Skin:  Negative for rash and wound.   Neurological:  Negative for dizziness, numbness and headaches.   Psychiatric/Behavioral:  Negative for decreased concentration and suicidal ideas. The patient is not nervous/anxious.      Physical Exam  Vitals and nursing note reviewed.   Constitutional:       Appearance: Normal appearance. She is well-developed.   HENT:      Head: Normocephalic and atraumatic.      Right Ear: External ear normal.      Left Ear: External ear normal.   Eyes:      General: No scleral icterus.     Extraocular Movements: Extraocular movements intact.      Conjunctiva/sclera: Conjunctivae normal.   Cardiovascular:      Rate and Rhythm: Normal rate.   Pulmonary:      Effort: Pulmonary effort is normal. No respiratory distress.   Musculoskeletal:      Cervical back: Normal range of motion and neck supple.      Comments: See Ortho exam   Skin:     General: Skin is warm and dry.   Neurological:      General: No focal deficit present.      Mental Status: She is alert and oriented to person, place, and time.   Psychiatric:         Behavior: Behavior normal.       Fracture / Dislocation Treatment    Date/Time: 4/24/2025 8:30 AM    Performed by: Aram Aggarwal DO  Authorized by: Aram Aggarwal DO    Patient Location:  [facility]  [place] Protocol:  procedure performed by consultantConsent: Verbal consent obtained. Written consent not obtained.  Risks and benefits: risks, benefits and alternatives were discussed  Consent given by: patient  Time out: Immediately prior to procedure a \"time out\" was called to verify the correct patient, procedure, equipment, support staff and site/side marked as required.  Patient understanding: " patient states understanding of the procedure being performed  Test results: test results available and properly labeled  Site marked: the operative site was marked  Radiology Images displayed and confirmed. If images not available, report reviewed: imaging studies available  Patient identity confirmed: verbally with patient    Injury location:  Hip  Location details:  Right hip  Injury type:  Fracture  Fracture type: greater trochanteric    Neurovascular status: Neurovascularly intact    Distal perfusion: normal    Neurological function: normal    Range of motion: reduced    Manipulation performed?: No    MAST used?: No    Neurovascular status: Neurovascularly intact    Distal perfusion: normal    Neurological function: normal    Range of motion: unchanged          Scribe Attestation      I,:  Chalino Duncan am acting as a scribe while in the presence of the attending physician.:       I,:  Aram Aggarwal, DO personally performed the services described in this documentation    as scribed in my presence.:           This document was created using speech voice recognition software.   Grammatical errors, random word insertions, pronoun errors, and incomplete sentences are an occasional consequence of this system due to software limitations, ambient noise, and hardware issues.   Any formal questions or concerns about content, text, or information contained within the body of this dictation should be directly addressed to the provider for clarification.

## 2025-04-24 NOTE — ASSESSMENT & PLAN NOTE
Orders:    XR hip/pelv 2-3 vws right if performed; Future    Ambulatory referral to Orthopedic Surgery

## 2025-04-28 ENCOUNTER — PATIENT OUTREACH (OUTPATIENT)
Dept: CASE MANAGEMENT | Facility: OTHER | Age: 60
End: 2025-04-28

## 2025-05-01 ENCOUNTER — TELEPHONE (OUTPATIENT)
Dept: PAIN MEDICINE | Facility: CLINIC | Age: 60
End: 2025-05-01

## 2025-05-01 ENCOUNTER — HOSPITAL ENCOUNTER (OUTPATIENT)
Facility: HOSPITAL | Age: 60
Setting detail: OUTPATIENT SURGERY
Discharge: HOME | End: 2025-05-01
Attending: ANESTHESIOLOGY | Admitting: ANESTHESIOLOGY
Payer: COMMERCIAL

## 2025-05-01 ENCOUNTER — APPOINTMENT (OUTPATIENT)
Dept: RADIOLOGY | Facility: HOSPITAL | Age: 60
End: 2025-05-01
Payer: COMMERCIAL

## 2025-05-01 VITALS
TEMPERATURE: 98.3 F | OXYGEN SATURATION: 95 % | HEIGHT: 64 IN | SYSTOLIC BLOOD PRESSURE: 147 MMHG | HEART RATE: 68 BPM | RESPIRATION RATE: 18 BRPM | DIASTOLIC BLOOD PRESSURE: 80 MMHG | WEIGHT: 125 LBS | BODY MASS INDEX: 21.34 KG/M2

## 2025-05-01 DIAGNOSIS — R26.2 AMBULATORY DYSFUNCTION: ICD-10-CM

## 2025-05-01 DIAGNOSIS — M47.816 LUMBAR SPONDYLOSIS: Primary | ICD-10-CM

## 2025-05-01 DIAGNOSIS — M54.16 LUMBAR RADICULOPATHY: ICD-10-CM

## 2025-05-01 DIAGNOSIS — S32.009A CLOSED FRACTURE OF TRANSVERSE PROCESS OF LUMBAR VERTEBRA (HCC): ICD-10-CM

## 2025-05-01 DIAGNOSIS — S32.030A CLOSED COMPRESSION FRACTURE OF THIRD LUMBAR VERTEBRA (HCC): ICD-10-CM

## 2025-05-01 PROCEDURE — 64636 DESTROY L/S FACET JNT ADDL: CPT | Performed by: ANESTHESIOLOGY

## 2025-05-01 PROCEDURE — 64635 DESTROY LUMB/SAC FACET JNT: CPT | Performed by: ANESTHESIOLOGY

## 2025-05-01 RX ORDER — LIDOCAINE HYDROCHLORIDE 10 MG/ML
INJECTION, SOLUTION EPIDURAL; INFILTRATION; INTRACAUDAL; PERINEURAL AS NEEDED
Status: DISCONTINUED | OUTPATIENT
Start: 2025-05-01 | End: 2025-05-01 | Stop reason: HOSPADM

## 2025-05-01 RX ORDER — LIDOCAINE HYDROCHLORIDE 20 MG/ML
INJECTION, SOLUTION EPIDURAL; INFILTRATION; INTRACAUDAL; PERINEURAL AS NEEDED
Status: DISCONTINUED | OUTPATIENT
Start: 2025-05-01 | End: 2025-05-01 | Stop reason: HOSPADM

## 2025-05-01 NOTE — TELEPHONE ENCOUNTER
Left L4-5 and L5-S1 RFA at [place] 5/1/25 with Dr Garcia    Office f/u 6/2 at 7am in [place] with RM

## 2025-05-01 NOTE — DISCHARGE INSTR - AVS FIRST PAGE

## 2025-05-01 NOTE — H&P
Assessment:  1. Lumbar spondylosis  FL spine and pain procedure    FL spine and pain procedure      2. Closed compression fracture of third lumbar vertebra (HCC)  FL spine and pain procedure    FL spine and pain procedure      3. Closed fracture of transverse process of lumbar vertebra (HCC)  FL spine and pain procedure    FL spine and pain procedure      4. Ambulatory dysfunction  FL spine and pain procedure    FL spine and pain procedure      5. Lumbar radiculopathy  FL spine and pain procedure    FL spine and pain procedure          Plan:  Cristofer Wiley is a 59 y.o. male with complaints of low back pain presents to surgical center for procedure.  We will perform a Procedure(s) (LRB):  ABLATION RADIO FREQUENCY (RFA) Left L4-5 and L5-S1  RFA (Left)   2. Follow-up 1 month after injection    Complete risks and benefits including bleeding, infection, tissue reaction, nerve injury and allergic reaction were discussed. The approach was demonstrated using models and literature was provided. Verbal and written consent was obtained.    My impressions and treatment recommendations were discussed in detail with the patient who verbalized understanding and had no further questions.  Discharge instructions were provided. I personally saw and examined the patient and I agree with the above discussed plan of care.    Orders Placed This Encounter   Procedures    FL spine and pain procedure     Standing Status:   Standing     Number of Occurrences:   1     Reason for Exam::   LEFT RFA; L4-L5, L5-S1     Anticoagulant hold needed?:   NA     No orders of the defined types were placed in this encounter.      History of Present Illness:  Cristofer Wiley is a 59 y.o. male who presents for a follow up office visit in regards to low back pain.   The patient’s current symptoms include 8 or 10 sharp and stabbing from pain down particular timeframe.      I have personally reviewed and/or updated the patient's past medical history, past surgical  history, family history, social history, current medications, allergies, and vital signs today.     Review of Systems   Musculoskeletal:  Positive for arthralgias and back pain.   All other systems reviewed and are negative.      Patient Active Problem List   Diagnosis    Tobacco abuse    H/O suicide attempt    H/O cervical spine surgery    Chronic hyponatremia    Dietary folate deficiency anemia    Ventral hernia without obstruction or gangrene    Hepatomegaly    Hypomagnesemia    Elevated alkaline phosphatase level    Alcoholic hepatitis without ascites    Vitamin D deficiency    Iron deficiency    Urinary retention    Bladder wall thickening    Hypophosphatemia    Generalized weakness    Malnutrition of moderate degree (HCC)    Hyponatremia    Hypokalemia    Incisional hernia    Hx of seizure disorder    Seizure-like activity (HCC)    Diarrhea    Abnormality of pancreatic duct    Allergic rhinitis    Microcytic anemia    Abdominal wound dehiscence    Cervical disc disorder with myelopathy    Cervical spinal stenosis    Depression    Left foot drop    Lumbar radiculopathy    Neuropathy involving both lower extremities    Peripheral neuropathy    T6 vertebral fracture (HCC)    Alcoholic cirrhosis of liver without ascites (HCC)    Thrombocytopenia (HCC)    Closed fracture of left olecranon process, initial encounter    Iron deficiency anemia due to chronic blood loss    Duodenal ulcer    Tubular adenoma    Traore esophagus    Celiac artery stenosis (HCC)    Pancreatic mass    Pancytopenia (HCC)    Tobacco use    Constipation    Transaminitis    Lesion of spleen    Left anterior fascicular block    Abnormal EKG    Acute on chronic pancreatitis (HCC)    Pancreatic pseudocyst    COPD (chronic obstructive pulmonary disease) (HCC)    Ileus (HCC)    Ambulatory dysfunction    Current every day smoker    Fall    Hx of duodenal ulcer    Neck pain    Thoracic compression fracture (HCC)    Aortic ectasia (HCC)    Rib  fractures    Seizure disorder (HCC)    Hypoxia    Traore's esophagus    Elevated d-dimer    COVID-19 virus infection    Hypocalcemia    Clavicular fracture    Low serum cortisol level    Chronic anemia    Osteoporosis with current pathological fracture    Abnormal CXR    Moderate protein-calorie malnutrition (HCC)    Esophageal abnormality    Insomnia    History of alcohol abuse    Hypoglycemia    Hypotension    Closed compression fracture of third lumbar vertebra (HCC)    Sacral fracture, closed (HCC)    Coagulopathy (HCC)    Closed fracture of transverse process of lumbar vertebra (HCC)    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)    Primary osteoarthritis of knee    Chronic pain syndrome    Lumbar spondylosis    Fecal smearing    Chronic obstructive pulmonary disease with acute exacerbation (HCC)    Closed nondisplaced fracture of greater trochanter of right femur (HCC)       Past Medical History:   Diagnosis Date    Alcohol abuse     Traore esophagus     Bowel obstruction (HCC)     Bowel perforation (HCC)     Cardiac disease     Continuous chronic alcoholism (HCC) 10/05/2017    Continuous chronic alcoholism (HCC) 10/05/2017    COPD (chronic obstructive pulmonary disease) (HCC)     History of shoulder surgery     Right shoulder    History of transfusion     Hx of cervical spine surgery     Hypertension     Incisional hernia 10/08/2017    [facility], old     Mitral regurgitation     Psychiatric disorder     Seizures (HCC)        Past Surgical History:   Procedure Laterality Date    APPENDECTOMY      BACK SURGERY      CHOLECYSTECTOMY      ESOPHAGOGASTRODUODENOSCOPY N/A 11/28/2016    Procedure: ESOPHAGOGASTRODUODENOSCOPY (EGD);  Surgeon: Darryl Monryo MD;  Location: [facility];  Service:     GALLBLADDER SURGERY      LAPAROTOMY N/A 10/25/2016    Procedure: LAPAROTOMY EXPLORATORY;  Surgeon: Renzo Harper MD;  Location: [facility];  Service:     MOUTH SURGERY      NERVE BLOCK Bilateral 02/15/2024     Procedure: Bilateral L4-5 and L5-S1 medial branch block #1;  Surgeon: Jonathan Garcia MD;  Location: [facility];  Service: Pain Management     NERVE BLOCK Bilateral 03/07/2024    Procedure: BLOCK MEDIAL BRANCH B/L L4-5 and L5-S1 MBB#2;  Surgeon: Jonathan Garcia MD;  Location: [facility];  Service: Pain Management     PERCUTANEOUS PINNING FEMORAL NECK FRACTURE      RADIOFREQUENCY ABLATION Left 04/03/2024    Procedure: Left L4-L5 and L5-S1 radiofrequency ablation;  Surgeon: Jonathan Garcia MD;  Location: [facility];  Service: Pain Management     RADIOFREQUENCY ABLATION Right 04/26/2024    Procedure: Right L4-L5 and L5-S1 radiofrequency ablation;  Surgeon: Jonathan Garcia MD;  Location: [facility];  Service: Pain Management     RADIOFREQUENCY ABLATION Right 4/17/2025    Procedure: Right L4-L5 and L5-S1 Radio Frequency ablation;  Surgeon: Jonathan Garcia MD;  Location: [facility];  Service: Pain Management     SHOULDER SURGERY Right     SHOULDER SURGERY      SMALL INTESTINE SURGERY      STOMACH SURGERY      bal surgery       Family History   Problem Relation Age of Onset    Breast cancer Mother     Prostate cancer Father     Skin cancer Brother        Social History     Occupational History    Not on file   Tobacco Use    Smoking status: Every Day     Current packs/day: 1.00     Average packs/day: 1 pack/day for 35.0 years (35.0 ttl pk-yrs)     Types: Cigarettes    Smokeless tobacco: Never   Vaping Use    Vaping status: Never Used   Substance and Sexual Activity    Alcohol use: Yes     Alcohol/week: 42.0 standard drinks of alcohol     Types: 42 Cans of beer per week     Comment: 6 pack/day    Drug use: No    Sexual activity: Not Currently     Partners: Female       No current facility-administered medications on file prior to encounter.     Current Outpatient Medications on File Prior to Encounter   Medication Sig    albuterol (PROVENTIL HFA,VENTOLIN HFA) 90 mcg/act inhaler Inhale 2 puffs every  "6 (six) hours as needed for wheezing    calcium carbonate (OS-KORI) 600 MG tablet Take 1 tablet (600 mg total) by mouth daily    cyanocobalamin (VITAMIN B-12) 100 mcg tablet Take 1 tablet (100 mcg total) by mouth daily    levETIRAcetam (KEPPRA) 750 mg tablet Take 1 tablet (750 mg total) by mouth every 12 (twelve) hours    Multiple Vitamin (Tab-A-Rosa) TABS Take 1 tablet by mouth daily    pregabalin (LYRICA) 100 mg capsule Take 1 capsule (100 mg total) by mouth 3 (three) times a day    thiamine (VITAMIN B1) 100 mg tablet Take 1 tablet (100 mg total) by mouth daily    umeclidinium-vilanterol (Anoro Ellipta) 62.5-25 mcg/actuation inhaler Inhale 1 puff daily       No Known Allergies    Physical Exam:    /79   Pulse 73   Temp 98.5 °F (36.9 °C) (Temporal)   Resp 20   Ht 5' 4\" (1.626 m)   Wt 56.7 kg (125 lb)   SpO2 98%   BMI 21.46 kg/m²     Constitutional:normal, well developed, well nourished, alert, in no distress and non-toxic and no overt pain behavior.  Eyes:anicteric  HEENT:grossly intact  Neck:supple, symmetric, trachea midline and no masses   Pulmonary:even and unlabored  Cardiovascular:No edema or pitting edema present  Skin:Normal without rashes or lesions and well hydrated  Psychiatric:Mood and affect appropriate  Neurologic:Cranial Nerves II-XII grossly intact  Musculoskeletal:normal        "

## 2025-05-01 NOTE — OP NOTE
OPERATIVE REPORT  PATIENT NAME: Cristofer Wiley    :  1965  MRN: [id number]  Pt Location: [facility]    SURGERY DATE: 2025    Surgeons and Role:     * Jonathan Garcia MD - Primary    Preop Diagnosis:  Lumbar spondylosis [M47.816]    Post-Op Diagnosis Codes:     * Lumbar spondylosis [M47.816]    Procedure(s):  Left - ABLATION RADIO FREQUENCY (RFA) Left L4-5 and L5-S1  RFA    Specimen(s):  * No specimens in log *    Estimated Blood Loss:   Minimal    Drains:  * No LDAs found *    Anesthesia Type:   Local    Operative Indications:  Lumbar spondylosis [M47.816]      Operative Findings:  same      Complications:   None    Procedure and Technique:  Fluoroscopically-guided Radiofrequency denervation of the left L4-L5 and L5-S1 facet joint(s)       After discussing the risks, benefits, and alternatives to the procedure, the patient expressed understanding and wished to proceed.  The patient was brought to the fluoroscopy suite and placed in the prone position.  Procedural pause conducted to verify:  correct patient identity, procedure to be performed and as applicable, correct side and site, correct patient position, and availability of implants, special equipment and special requirements.  Using fluoroscopy, the junction of the transverse process and superior articulating process of the left L4, L5, S1 levels were identified and marked.  The skin was sterilely prepped and draped in the usual fashion using Chloraprep skin prep.  The skin and subcutaneous tissue were anesthetized with 0.5 % lidocaine.   Using fluoroscopic guidance, a 100 mm 18 gauge RFK RF cannula with a 10 mm active tip was advanced to each target.  This was confirmed using PA, lateral and oblique fluoroscopic views.  Motor testing was performed at 2Hz up to 2.5 volts, and the measured tissue impedances were satisfactory.  With final needle positioning, there was no evidence of radicular stimulation.  Prior to lesioning, 1cc of 2% lidocaine  was injected at each site.  After waiting 2 minutes for local anesthesia to take effect, lesioning was performed at 90 degrees Celsius for 90 seconds. A slight repositioning of the needles was performed an lesioning was again performed at 90 degreees Celsius for 90 seconds. After RF treatment, each site received 1cc of 0.25% bupivacaine. The patient tolerated the procedure well and there were no apparent complications.  After appropriate observation, the patient was dismissed from the clinic in good condition under their own power.    I was present for the entire procedure.    Patient Disposition:  hemodynamically stable             SIGNATURE: Jonathan Garcia MD  DATE: May 1, 2025  TIME: 11:10 AM

## 2025-05-02 ENCOUNTER — TELEPHONE (OUTPATIENT)
Dept: CASE MANAGEMENT | Facility: OTHER | Age: 60
End: 2025-05-02

## 2025-05-02 ENCOUNTER — PATIENT OUTREACH (OUTPATIENT)
Dept: CASE MANAGEMENT | Facility: OTHER | Age: 60
End: 2025-05-02

## 2025-05-02 NOTE — PROGRESS NOTES
I spoke with Cristofer who had a procedure done yesterday at [facility] ablation radio frequency L4-5 and L5-S1. He is pain free and would recommend .  We reviewed his appointments. He has no needs.  He thanked me for calling but declined further outreach.  He has my contact information and I asked him to call me if he needs assistance.

## 2025-05-09 ENCOUNTER — OFFICE VISIT (OUTPATIENT)
Dept: NEPHROLOGY | Facility: CLINIC | Age: 60
End: 2025-05-09
Payer: COMMERCIAL

## 2025-05-09 VITALS
HEIGHT: 64 IN | BODY MASS INDEX: 21.68 KG/M2 | HEART RATE: 80 BPM | DIASTOLIC BLOOD PRESSURE: 92 MMHG | TEMPERATURE: 97.7 F | SYSTOLIC BLOOD PRESSURE: 143 MMHG | RESPIRATION RATE: 16 BRPM | OXYGEN SATURATION: 95 % | WEIGHT: 127 LBS

## 2025-05-09 DIAGNOSIS — I10 ESSENTIAL HYPERTENSION: ICD-10-CM

## 2025-05-09 DIAGNOSIS — E83.42 HYPOMAGNESEMIA: ICD-10-CM

## 2025-05-09 DIAGNOSIS — E87.1 HYPONATREMIA: Primary | ICD-10-CM

## 2025-05-09 PROCEDURE — 99214 OFFICE O/P EST MOD 30 MIN: CPT

## 2025-05-09 NOTE — ASSESSMENT & PLAN NOTE
Patient has a history of chronic hyponatremia thought to be due to ADH release from cirrhosis, he also has issues with chronic pain.  He is also on omeprazole 40 mg twice daily and Keppra  Recommend continuing with about 64 ounces fluid restriction daily  He does also appear to have a true solute deficiency hence why he is also on salt tablets despite the cirrhosis  His sodium levels do appear to fluctuate but in general, his sodium is within the low 130 to upper 120 range  Current sodium levels 128  Hyponatremia can predispose to falls and fractures, continue to treat adequately  Patient is on very high dose sodium supplementation, 3 g twice daily  Patient asymptomatic from sodium standpoint and he appears euvolemic  Monitor BMP monthly for 6 months   Orders:    Basic metabolic panel; Standing

## 2025-05-09 NOTE — ASSESSMENT & PLAN NOTE
Patient is on omeprazole 40 mg twice daily which can contribute to hypomagnesemia  Continue with magnesium formulation magnesium lactate 84 mcg daily  Increase magnesium supplement to twice a day as most recent level is still low

## 2025-05-09 NOTE — PROGRESS NOTES
Name: Cristofer Wiley      : 1965      MRN: [id number]  Encounter Provider: David Dyer PA-C  Encounter Date: 2025   Encounter department: [facility]  :  Assessment & Plan  Hyponatremia  Patient has a history of chronic hyponatremia thought to be due to ADH release from cirrhosis, he also has issues with chronic pain.  He is also on omeprazole 40 mg twice daily and Keppra  Recommend continuing with about 64 ounces fluid restriction daily  He does also appear to have a true solute deficiency hence why he is also on salt tablets despite the cirrhosis  His sodium levels do appear to fluctuate but in general, his sodium is within the low 130 to upper 120 range  Current sodium levels 128  Hyponatremia can predispose to falls and fractures, continue to treat adequately  Patient is on very high dose sodium supplementation, 3 g twice daily  Patient asymptomatic from sodium standpoint and he appears euvolemic  Monitor BMP monthly for 6 months   Orders:    Basic metabolic panel; Standing    Hypomagnesemia  Patient is on omeprazole 40 mg twice daily which can contribute to hypomagnesemia  Continue with magnesium formulation magnesium lactate 84 mcg daily  Increase magnesium supplement to twice a day as most recent level is still low        Essential hypertension  Patient is on lisinopril 2.5 mg daily, BP well controlled at home           Patient Instructions   It was nice seeing you today. Your kidney function is stable. Please follow up with us in 6 months. I have ordered lab work for you to get done before then. In the meantime, please avoid NSAIDs and have a healthy diet and exercise.  Monitor BMP every month. Follow up in 6 months. Increase magnesium supplement to twice daily. Continue to follow a 64 ounce fluid restriction, eat a high protein diet.     It was a pleasure evaluating your patient in the office today. Thank you for allowing our team to participate in  the care of  Cristofer Wiley. Please do not hesitate to contact our team if further issues/questions shall arise in the interim.     History of Present Illness   HPI  Cristofer Wiley is a 59 y.o. male who presents for nephrology follow-up visit regarding hyponatremia.  Patient had a recent hospital admission at [facility] after presenting with a fall and a closed nondisplaced fracture of the greater trochanter of the right femur from April 4 to April 8.  Nephrology was not consulted during this hospital stay, sodium levels appear to remain stable.  History obtained from: patient  Pertinent Medical History         Review of Systems   Constitutional:  Negative for chills and fever.   HENT:  Negative for rhinorrhea and sore throat.    Respiratory:  Negative for cough and shortness of breath.    Cardiovascular:  Negative for chest pain and palpitations.   Gastrointestinal:  Negative for abdominal pain and nausea.   Genitourinary:  Negative for dysuria and hematuria.   Neurological:  Negative for dizziness and headaches.     Medical History Reviewed by provider this encounter:  Tobacco  Allergies  Meds  Problems  Med Hx  Surg Hx  Fam Hx     .       Current Outpatient Medications on File Prior to Visit   Medication Sig Dispense Refill    albuterol (PROVENTIL HFA,VENTOLIN HFA) 90 mcg/act inhaler Inhale 2 puffs every 6 (six) hours as needed for wheezing 9 g 0    calcium carbonate (OS-KORI) 600 MG tablet Take 1 tablet (600 mg total) by mouth daily 100 tablet 3    cyanocobalamin (VITAMIN B-12) 100 mcg tablet Take 1 tablet (100 mcg total) by mouth daily 90 tablet 0    doxepin (SINEquan) 50 mg capsule Take 1 capsule by mouth once daily at bedtime 30 capsule 0    folic acid (FOLVITE) 1 mg tablet Take 1 tablet by mouth once daily 90 tablet 0    levETIRAcetam (KEPPRA) 750 mg tablet Take 1 tablet (750 mg total) by mouth every 12 (twelve) hours 180 tablet 2    lisinopril (ZESTRIL) 2.5 mg tablet Take 1 tablet (2.5 mg total) by mouth  "daily 90 tablet 0    Multiple Vitamin (Tab-A-Rosa) TABS Take 1 tablet by mouth daily 30 tablet 6    omeprazole (PriLOSEC) 40 MG capsule Take 1 capsule by mouth twice daily 180 capsule 0    pregabalin (LYRICA) 100 mg capsule Take 1 capsule (100 mg total) by mouth 3 (three) times a day 90 capsule 2    sodium chloride 1 g tablet TAKE 3 TABLETS BY MOUTH TWICE DAILY 540 tablet 0    thiamine (VITAMIN B1) 100 mg tablet Take 1 tablet (100 mg total) by mouth daily 90 tablet 1    umeclidinium-vilanterol (Anoro Ellipta) 62.5-25 mcg/actuation inhaler Inhale 1 puff daily 180 each 1    [DISCONTINUED] methocarbamol (ROBAXIN) 500 mg tablet Take 1 tablet (500 mg total) by mouth 3 (three) times a day for 10 days (Patient not taking: Reported on 5/9/2025) 30 tablet 0     No current facility-administered medications on file prior to visit.     Objective   /92 (Patient Position: Sitting, Cuff Size: Standard)   Pulse 80   Temp 97.7 °F (36.5 °C) (Temporal)   Resp 16   Ht 5' 4\" (1.626 m)   Wt 57.6 kg (127 lb)   SpO2 95%   BMI 21.80 kg/m²      Physical Exam  Vitals and nursing note reviewed.   Constitutional:       General: He is not in acute distress.     Appearance: He is well-developed.   HENT:      Head: Normocephalic and atraumatic.   Eyes:      Conjunctiva/sclera: Conjunctivae normal.   Cardiovascular:      Rate and Rhythm: Normal rate and regular rhythm.      Heart sounds: No murmur heard.  Pulmonary:      Effort: Pulmonary effort is normal. No respiratory distress.      Breath sounds: Normal breath sounds.   Abdominal:      Palpations: Abdomen is soft.      Tenderness: There is no abdominal tenderness.   Musculoskeletal:         General: No swelling.      Cervical back: Neck supple.   Skin:     General: Skin is warm and dry.      Capillary Refill: Capillary refill takes less than 2 seconds.   Neurological:      Mental Status: He is alert.   Psychiatric:         Mood and Affect: Mood normal.           Laboratory " "Results:        Invalid input(s): \"ALBUMIN\"    Results for orders placed or performed during the hospital encounter of 04/04/25   Comprehensive metabolic panel   Result Value Ref Range    Sodium 129 (L) 135 - 147 mmol/L    Potassium 3.7 3.5 - 5.3 mmol/L    Chloride 90 (L) 96 - 108 mmol/L    CO2 29 21 - 32 mmol/L    ANION GAP 10 4 - 13 mmol/L    BUN 5 5 - 25 mg/dL    Creatinine 0.59 (L) 0.60 - 1.30 mg/dL    Glucose 94 65 - 140 mg/dL    Calcium 9.5 8.4 - 10.2 mg/dL    AST 48 (H) 13 - 39 U/L    ALT 22 7 - 52 U/L    Alkaline Phosphatase 105 (H) 34 - 104 U/L    Total Protein 7.9 6.4 - 8.4 g/dL    Albumin 4.3 3.5 - 5.0 g/dL    Total Bilirubin 1.10 (H) 0.20 - 1.00 mg/dL    eGFR 111 ml/min/1.73sq m   Magnesium   Result Value Ref Range    Magnesium 1.2 (L) 1.9 - 2.7 mg/dL   Phosphorus   Result Value Ref Range    Phosphorus 3.2 2.7 - 4.5 mg/dL   CBC and differential   Result Value Ref Range    WBC 5.84 4.31 - 10.16 Thousand/uL    RBC 4.26 3.88 - 5.62 Million/uL    Hemoglobin 13.5 12.0 - 17.0 g/dL    Hematocrit 39.2 36.5 - 49.3 %    MCV 92 82 - 98 fL    MCH 31.7 26.8 - 34.3 pg    MCHC 34.4 31.4 - 37.4 g/dL    RDW 14.6 11.6 - 15.1 %    MPV 9.8 8.9 - 12.7 fL    Platelets 136 (L) 149 - 390 Thousands/uL    nRBC 0 /100 WBCs    Segmented % 73 43 - 75 %    Immature Grans % 0 0 - 2 %    Lymphocytes % 14 14 - 44 %    Monocytes % 11 4 - 12 %    Eosinophils Relative 1 0 - 6 %    Basophils Relative 1 0 - 1 %    Absolute Neutrophils 4.29 1.85 - 7.62 Thousands/µL    Absolute Immature Grans 0.01 0.00 - 0.20 Thousand/uL    Absolute Lymphocytes 0.84 0.60 - 4.47 Thousands/µL    Absolute Monocytes 0.63 0.17 - 1.22 Thousand/µL    Eosinophils Absolute 0.04 0.00 - 0.61 Thousand/µL    Basophils Absolute 0.03 0.00 - 0.10 Thousands/µL   Sodium, urine, random   Result Value Ref Range    Sodium, Ur 111.0 mmol/L   Osmolality, urine   Result Value Ref Range    Osmolality, Ur 386 250 - 900 mmol/KG   Comprehensive metabolic panel   Result Value Ref Range    " Sodium 127 (L) 135 - 147 mmol/L    Potassium 3.9 3.5 - 5.3 mmol/L    Chloride 91 (L) 96 - 108 mmol/L    CO2 27 21 - 32 mmol/L    ANION GAP 9 4 - 13 mmol/L    BUN 7 5 - 25 mg/dL    Creatinine 0.63 0.60 - 1.30 mg/dL    Glucose 91 65 - 140 mg/dL    Calcium 8.9 8.4 - 10.2 mg/dL    AST 36 13 - 39 U/L    ALT 18 7 - 52 U/L    Alkaline Phosphatase 95 34 - 104 U/L    Total Protein 7.1 6.4 - 8.4 g/dL    Albumin 3.8 3.5 - 5.0 g/dL    Total Bilirubin 1.02 (H) 0.20 - 1.00 mg/dL    eGFR 108 ml/min/1.73sq m   Magnesium   Result Value Ref Range    Magnesium 2.1 1.9 - 2.7 mg/dL   Phosphorus   Result Value Ref Range    Phosphorus 3.0 2.7 - 4.5 mg/dL   CBC and differential   Result Value Ref Range    WBC 4.73 4.31 - 10.16 Thousand/uL    RBC 4.18 3.88 - 5.62 Million/uL    Hemoglobin 12.9 12.0 - 17.0 g/dL    Hematocrit 38.7 36.5 - 49.3 %    MCV 93 82 - 98 fL    MCH 30.9 26.8 - 34.3 pg    MCHC 33.3 31.4 - 37.4 g/dL    RDW 14.7 11.6 - 15.1 %    MPV 10.3 8.9 - 12.7 fL    Platelets 119 (L) 149 - 390 Thousands/uL    nRBC 0 /100 WBCs    Segmented % 69 43 - 75 %    Immature Grans % 0 0 - 2 %    Lymphocytes % 17 14 - 44 %    Monocytes % 12 4 - 12 %    Eosinophils Relative 2 0 - 6 %    Basophils Relative 0 0 - 1 %    Absolute Neutrophils 3.20 1.85 - 7.62 Thousands/µL    Absolute Immature Grans 0.01 0.00 - 0.20 Thousand/uL    Absolute Lymphocytes 0.81 0.60 - 4.47 Thousands/µL    Absolute Monocytes 0.58 0.17 - 1.22 Thousand/µL    Eosinophils Absolute 0.11 0.00 - 0.61 Thousand/µL    Basophils Absolute 0.02 0.00 - 0.10 Thousands/µL   Levetiracetam level   Result Value Ref Range    Levetiracetam Lvl <2.0 (L) 12.0 - 46.0 ug/mL   CBC and differential   Result Value Ref Range    WBC 5.27 4.31 - 10.16 Thousand/uL    RBC 3.85 (L) 3.88 - 5.62 Million/uL    Hemoglobin 11.9 (L) 12.0 - 17.0 g/dL    Hematocrit 35.7 (L) 36.5 - 49.3 %    MCV 93 82 - 98 fL    MCH 30.9 26.8 - 34.3 pg    MCHC 33.3 31.4 - 37.4 g/dL    RDW 15.0 11.6 - 15.1 %    MPV 10.2 8.9 - 12.7 fL     Platelets 111 (L) 149 - 390 Thousands/uL    nRBC 0 /100 WBCs    Segmented % 58 43 - 75 %    Immature Grans % 0 0 - 2 %    Lymphocytes % 25 14 - 44 %    Monocytes % 14 (H) 4 - 12 %    Eosinophils Relative 3 0 - 6 %    Basophils Relative 0 0 - 1 %    Absolute Neutrophils 3.06 1.85 - 7.62 Thousands/µL    Absolute Immature Grans 0.01 0.00 - 0.20 Thousand/uL    Absolute Lymphocytes 1.30 0.60 - 4.47 Thousands/µL    Absolute Monocytes 0.75 0.17 - 1.22 Thousand/µL    Eosinophils Absolute 0.13 0.00 - 0.61 Thousand/µL    Basophils Absolute 0.02 0.00 - 0.10 Thousands/µL   CK   Result Value Ref Range    Total  39 - 308 U/L   Comprehensive metabolic panel   Result Value Ref Range    Sodium 127 (L) 135 - 147 mmol/L    Potassium 4.0 3.5 - 5.3 mmol/L    Chloride 93 (L) 96 - 108 mmol/L    CO2 29 21 - 32 mmol/L    ANION GAP 5 4 - 13 mmol/L    BUN 11 5 - 25 mg/dL    Creatinine 0.79 0.60 - 1.30 mg/dL    Glucose 98 65 - 140 mg/dL    Calcium 8.5 8.4 - 10.2 mg/dL    Corrected Calcium 9.0 8.3 - 10.1 mg/dL    AST 27 13 - 39 U/L    ALT 14 7 - 52 U/L    Alkaline Phosphatase 90 34 - 104 U/L    Total Protein 6.4 6.4 - 8.4 g/dL    Albumin 3.4 (L) 3.5 - 5.0 g/dL    Total Bilirubin 0.72 0.20 - 1.00 mg/dL    eGFR 98 ml/min/1.73sq m   Magnesium   Result Value Ref Range    Magnesium 1.5 (L) 1.9 - 2.7 mg/dL   Phosphorus   Result Value Ref Range    Phosphorus 2.5 (L) 2.7 - 4.5 mg/dL   Procalcitonin   Result Value Ref Range    Procalcitonin 0.06 <=0.25 ng/ml   Hepatitis panel, acute   Result Value Ref Range    Hepatitis B Surface Ag Non-reactive Non-Reactive    Hep A IgM Non-reactive Non-Reactive    Hepatitis C Ab Non-reactive Non-Reactive    Hep B C IgM Non-reactive Non-Reactive   HIV 1/2 AG/AB w Reflex [facility] for 2 yr old and above   Result Value Ref Range    HIV AB/AG HT Interp Non-Reactive Non-Reactive   Vitamin D 25 hydroxy   Result Value Ref Range    Vit D, 25-Hydroxy 31.5 30.0 - 100.0 ng/mL   Vitamin B12   Result Value Ref Range     Vitamin B-12 239 180 - 914 pg/mL   Folate   Result Value Ref Range    Folate 20.3 >5.9 ng/mL   Magnesium   Result Value Ref Range    Magnesium 1.6 (L) 1.9 - 2.7 mg/dL   Phosphorus   Result Value Ref Range    Phosphorus 3.7 2.7 - 4.5 mg/dL   CBC and differential   Result Value Ref Range    WBC 5.16 4.31 - 10.16 Thousand/uL    RBC 3.83 (L) 3.88 - 5.62 Million/uL    Hemoglobin 12.0 12.0 - 17.0 g/dL    Hematocrit 36.4 (L) 36.5 - 49.3 %    MCV 95 82 - 98 fL    MCH 31.3 26.8 - 34.3 pg    MCHC 33.0 31.4 - 37.4 g/dL    RDW 15.0 11.6 - 15.1 %    MPV 10.3 8.9 - 12.7 fL    Platelets 110 (L) 149 - 390 Thousands/uL   Comprehensive metabolic panel   Result Value Ref Range    Sodium 131 (L) 135 - 147 mmol/L    Potassium 4.0 3.5 - 5.3 mmol/L    Chloride 96 96 - 108 mmol/L    CO2 27 21 - 32 mmol/L    ANION GAP 8 4 - 13 mmol/L    BUN 9 5 - 25 mg/dL    Creatinine 0.64 0.60 - 1.30 mg/dL    Glucose 90 65 - 140 mg/dL    Calcium 8.6 8.4 - 10.2 mg/dL    AST 39 13 - 39 U/L    ALT 17 7 - 52 U/L    Alkaline Phosphatase 103 34 - 104 U/L    Total Protein 6.6 6.4 - 8.4 g/dL    Albumin 3.5 3.5 - 5.0 g/dL    Total Bilirubin 0.57 0.20 - 1.00 mg/dL    eGFR 107 ml/min/1.73sq m   CK   Result Value Ref Range    Total CK 62 39 - 308 U/L   CBC and Platelet   Result Value Ref Range    WBC 6.69 4.31 - 10.16 Thousand/uL    RBC 3.79 (L) 3.88 - 5.62 Million/uL    Hemoglobin 11.9 (L) 12.0 - 17.0 g/dL    Hematocrit 36.6 36.5 - 49.3 %    MCV 97 82 - 98 fL    MCH 31.4 26.8 - 34.3 pg    MCHC 32.5 31.4 - 37.4 g/dL    RDW 15.2 (H) 11.6 - 15.1 %    Platelets 134 (L) 149 - 390 Thousands/uL    MPV 10.9 8.9 - 12.7 fL   Basic metabolic panel   Result Value Ref Range    Sodium 128 (L) 135 - 147 mmol/L    Potassium 4.0 3.5 - 5.3 mmol/L    Chloride 92 (L) 96 - 108 mmol/L    CO2 30 21 - 32 mmol/L    ANION GAP 6 4 - 13 mmol/L    BUN 11 5 - 25 mg/dL    Creatinine 0.80 0.60 - 1.30 mg/dL    Glucose 105 65 - 140 mg/dL    Calcium 8.5 8.4 - 10.2 mg/dL    eGFR 98 ml/min/1.73sq m    Magnesium   Result Value Ref Range    Magnesium 1.6 (L) 1.9 - 2.7 mg/dL   Phosphorus   Result Value Ref Range    Phosphorus 3.0 2.7 - 4.5 mg/dL   Manual Differential(PHLEBS Do Not Order)   Result Value Ref Range    Segmented % 59 43 - 75 %    Lymphocytes % 26 14 - 44 %    Monocytes % 10 4 - 12 %    Eosinophils % 4 0 - 6 %    Basophils % 1 0 - 1 %    Absolute Neutrophils 3.04 1.85 - 7.62 Thousand/uL    Absolute Lymphocytes 1.34 0.60 - 4.47 Thousand/uL    Absolute Monocytes 0.52 0.00 - 1.22 Thousand/uL    Absolute Eosinophils 0.21 0.00 - 0.40 Thousand/uL    Absolute Basophils 0.05 0.00 - 0.10 Thousand/uL    Total Counted      RBC Morphology Normal     Platelet Estimate Decreased (A) Adequate     *Note: Due to a large number of results and/or encounters for the requested time period, some results have not been displayed. A complete set of results can be found in Results Review.

## 2025-05-09 NOTE — PATIENT INSTRUCTIONS
It was nice seeing you today. Your kidney function is stable. Please follow up with us in 6 months. I have ordered lab work for you to get done before then. In the meantime, please avoid NSAIDs and have a healthy diet and exercise.  Monitor BMP every month. Follow up in 6 months. Increase magnesium supplement to twice daily. Continue to follow a 64 ounce fluid restriction, eat a high protein diet.

## (undated) DEVICE — NEEDLE 18 G X 1 1/2

## (undated) DEVICE — PAD GROUNDING IONIC RF DISP

## (undated) DEVICE — NEEDLE 25G X 1 1/2

## (undated) DEVICE — GAUZE SPONGES,16 PLY: Brand: CURITY

## (undated) DEVICE — GLOVE PI ULTRA TOUCH SZ.8.5

## (undated) DEVICE — FLEXIBLE ADHESIVE BANDAGE,X-LARGE: Brand: CURITY

## (undated) DEVICE — SYRINGE 10ML LL

## (undated) DEVICE — SYRINGE 5ML LL

## (undated) DEVICE — CHLORAPREP HI-LITE 10.5ML ORANGE

## (undated) DEVICE — GLOVE INDICATOR PI UNDERGLOVE SZ 8.5 BLUE

## (undated) DEVICE — NEEDLE SPINAL 22G X 3.5IN  QUINCKE

## (undated) DEVICE — GLOVE SRG BIOGEL 8

## (undated) DEVICE — Device

## (undated) DEVICE — THREE-QUARTER SHEET: Brand: CONVERTORS

## (undated) DEVICE — SYRINGE 3ML LL

## (undated) DEVICE — DISPOSABLE OR TOWEL: Brand: CARDINAL HEALTH